# Patient Record
Sex: FEMALE | Race: WHITE | NOT HISPANIC OR LATINO | Employment: OTHER | URBAN - METROPOLITAN AREA
[De-identification: names, ages, dates, MRNs, and addresses within clinical notes are randomized per-mention and may not be internally consistent; named-entity substitution may affect disease eponyms.]

---

## 2019-07-08 ENCOUNTER — TELEPHONE (OUTPATIENT)
Dept: INFECTIOUS DISEASES | Facility: CLINIC | Age: 69
End: 2019-07-08

## 2019-07-08 NOTE — TELEPHONE ENCOUNTER
Received a call from pt for treatment for latent tb  Expressed we would need a test showing that she has latent tb and a referral  Pt would also need a chest xray  She will request these things as she sees her pcp this week  I have provided her with our fax number to provide us with this info

## 2019-08-01 ENCOUNTER — OFFICE VISIT (OUTPATIENT)
Dept: INFECTIOUS DISEASES | Facility: CLINIC | Age: 69
End: 2019-08-01
Payer: MEDICARE

## 2019-08-01 VITALS
HEART RATE: 73 BPM | SYSTOLIC BLOOD PRESSURE: 118 MMHG | RESPIRATION RATE: 16 BRPM | TEMPERATURE: 98.4 F | WEIGHT: 132.8 LBS | BODY MASS INDEX: 23.53 KG/M2 | DIASTOLIC BLOOD PRESSURE: 66 MMHG | HEIGHT: 63 IN

## 2019-08-01 DIAGNOSIS — R76.11 POSITIVE TB TEST: ICD-10-CM

## 2019-08-01 DIAGNOSIS — Z22.7 TB LUNG, LATENT: ICD-10-CM

## 2019-08-01 DIAGNOSIS — L40.9 PSORIASIS: ICD-10-CM

## 2019-08-01 DIAGNOSIS — Z22.7 TB LUNG, LATENT: Primary | ICD-10-CM

## 2019-08-01 PROCEDURE — 99204 OFFICE O/P NEW MOD 45 MIN: CPT | Performed by: INTERNAL MEDICINE

## 2019-08-01 RX ORDER — PYRIDOXINE HCL (VITAMIN B6) 50 MG
50 TABLET ORAL DAILY
Qty: 30 TABLET | Refills: 8 | Status: SHIPPED | OUTPATIENT
Start: 2019-08-01 | End: 2019-08-01 | Stop reason: SDUPTHER

## 2019-08-01 RX ORDER — FLUOCINOLONE ACETONIDE 0.11 MG/ML
OIL TOPICAL
COMMUNITY
Start: 2011-10-17

## 2019-08-01 RX ORDER — ISONIAZID 300 MG/1
300 TABLET ORAL DAILY
Qty: 30 TABLET | Refills: 8 | Status: SHIPPED | OUTPATIENT
Start: 2019-08-01 | End: 2019-08-01 | Stop reason: SDUPTHER

## 2019-08-01 RX ORDER — PYRIDOXINE HCL (VITAMIN B6) 50 MG
50 TABLET ORAL DAILY
Qty: 30 TABLET | Refills: 8 | Status: SHIPPED | OUTPATIENT
Start: 2019-08-01

## 2019-08-01 RX ORDER — AMLODIPINE BESYLATE 5 MG/1
5 TABLET ORAL 2 TIMES DAILY
COMMUNITY
Start: 2019-07-15

## 2019-08-01 RX ORDER — ROSUVASTATIN CALCIUM 5 MG/1
5 TABLET, COATED ORAL DAILY
COMMUNITY
Start: 2019-07-15

## 2019-08-01 RX ORDER — CALCIPOTRIENE 50 UG/G
CREAM TOPICAL
COMMUNITY

## 2019-08-01 RX ORDER — DIPHENHYDRAMINE HCL 25 MG
25 CAPSULE ORAL EVERY 12 HOURS PRN
COMMUNITY

## 2019-08-01 RX ORDER — LOSARTAN POTASSIUM 50 MG/1
50 TABLET ORAL DAILY
COMMUNITY
Start: 2019-07-15

## 2019-08-01 RX ORDER — ISONIAZID 300 MG/1
300 TABLET ORAL DAILY
Qty: 90 TABLET | Refills: 2 | Status: SHIPPED | OUTPATIENT
Start: 2019-08-01 | End: 2020-07-02 | Stop reason: ALTCHOICE

## 2019-08-01 NOTE — PATIENT INSTRUCTIONS
Take isoniazid (antibiotic) and pyridoxine/B6 (vitamin) for 9 months  Get blood work in 1 month prior to visit with us  Report to us immediately any new abdominal pain, nausea, vomiting, yellow discoloration of your skin, or tingling/pain/numbness of your hands or feet

## 2019-08-01 NOTE — PROGRESS NOTES
Outpatient Consultation - Infectious Disease   Jb Luque 76 y o  female MRN: [id number]      IMPRESSION & RECOMMENDATIONS:   Impression/Recommendations:  1  Latent TB  Serial positive PPD in setting of known TB exposure as a child  No signs or symptoms of active TB  Chest x-ray negative  High risk for reactivation in setting of possible biological therapy for # 2  At this time low risk for hepatotoxicity given normal CMP, no high-risk medications  Does drink daily alcohol  Rec:   · Start INH/pyridoxine with plan to continue for 9 months total  · Check CMP in 1 month at patient request   If normal, likely does not need further lab monitoring  · RTO in one month  If tolerating meds can f/u Q3 months  · Advised cessation of daily alcohol use while on INH  · Advised monitoring for any new abdominal pain, nausea, vomiting, yellow discoloration of skin, or tingling/pain/numbness of hands or feet while on INH  2   Psoriatic arthritis  Currently on apremilast/Otezla which is becoming preservative Piedad expensive  May need to pursue biologics  Rec:   · Outpatient Rheumatology follow-up ongoing    The above plan discussed in detail with the patient    Antibiotics:  None    Thank you for this consultation  HISTORY OF PRESENT ILLNESS:  Reason for Consult:  Positive PPD    HPI: Jb Luque is a 76 y o  female with history of psoriatic arthritis  This was originally treated with topical steroids per Dermatology  She was recently started on apremilast/Otezla by her rheumatologist   She has been getting samples but this is becoming prohibitive Piedad expensive so she may need to pursue biologic in the future  The patient has a history of her father having tuberculosis when she was 15years old  Her mother worked at that time and 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    She did have a PPD that was positive at that time but there is no medications available so she was seat followed serially with chest x-rays  She subsequently did well  As an adult she was found to have a positive PPD again but at that time it was felt potential toxicities of the medications outweighed any benefit and she was not treated  Given that she is going to pursue potential biologic therapy we are asked to comment on further evaluation and management  REVIEW OF SYSTEMS:  She denies any fevers, chills, sweats, cough, weight loss  A complete system-based review of systems is otherwise negative  PAST MEDICAL HISTORY:  Past Medical History:   Diagnosis Date    Hyperlipidemia     Hypertension     Psoriasis      Past Surgical History:   Procedure Laterality Date    APPENDECTOMY         FAMILY HISTORY:  Non-contributory    SOCIAL HISTORY:  Social History     Substance and Sexual Activity   Alcohol Use Not on file     Social History     Substance and Sexual Activity   Drug Use Not on file     Social History     Tobacco Use   Smoking Status Former Smoker   Smokeless Tobacco Never Used   Tobacco Comment    Quit 2010       ALLERGIES:  No Known Allergies    MEDICATIONS:  All current active medications have been reviewed      PHYSICAL EXAM:  Vitals:  /66   Pulse 73   Temp 98 4 °F (36 9 °C)   Resp 16   Ht 5' 3" (1 6 m)   Wt 60 2 kg (132 lb 12 8 oz)   BMI 23 52 kg/m²      Physical Exam:  General:  Well-nourished, well-developed, in no acute distress  Eyes:  Conjunctive clear with no hemorrhages or effusions  Oropharynx:  No ulcers, no lesions  Neck:  Supple, no lymphadenopathy  Lungs:  Clear to auscultation bilaterally, no accessory muscle use  Cardiac:  Regular rate and rhythm, no murmurs  Abdomen:  Soft, non-tender, non-distended  Extremities:  No peripheral cyanosis, clubbing, or edema  Skin:  No rashes, no ulcers, scars from psoriatic plaques over elbows  Neurological:  Moves all four extremities spontaneously, sensation grossly intact    LABS, IMAGING, & OTHER STUDIES:  Lab Results:  I have personally reviewed pertinent labs  CBC, CMP from 7/2/19 normal    Imaging Studies:   I have personally reviewed pertinent imaging study reports and images in PACS  CXR 7/16 from [facility] no pneumonia    EKG, Pathology, and Other Studies:   I have personally reviewed pertinent reports

## 2019-09-03 ENCOUNTER — APPOINTMENT (OUTPATIENT)
Dept: LAB | Facility: CLINIC | Age: 69
End: 2019-09-03
Payer: MEDICARE

## 2019-09-03 DIAGNOSIS — Z22.7 TB LUNG, LATENT: ICD-10-CM

## 2019-09-03 LAB
ALBUMIN SERPL BCP-MCNC: 3.9 G/DL (ref 3.5–5)
ALP SERPL-CCNC: 44 U/L (ref 46–116)
ALT SERPL W P-5'-P-CCNC: 38 U/L (ref 12–78)
ANION GAP SERPL CALCULATED.3IONS-SCNC: 7 MMOL/L (ref 4–13)
AST SERPL W P-5'-P-CCNC: 27 U/L (ref 5–45)
BILIRUB SERPL-MCNC: 0.4 MG/DL (ref 0.2–1)
BUN SERPL-MCNC: 13 MG/DL (ref 5–25)
CALCIUM SERPL-MCNC: 9 MG/DL (ref 8.3–10.1)
CHLORIDE SERPL-SCNC: 106 MMOL/L (ref 100–108)
CO2 SERPL-SCNC: 28 MMOL/L (ref 21–32)
CREAT SERPL-MCNC: 1.03 MG/DL (ref 0.6–1.3)
GFR SERPL CREATININE-BSD FRML MDRD: 56 ML/MIN/1.73SQ M
GLUCOSE P FAST SERPL-MCNC: 92 MG/DL (ref 65–99)
POTASSIUM SERPL-SCNC: 4 MMOL/L (ref 3.5–5.3)
PROT SERPL-MCNC: 7 G/DL (ref 6.4–8.2)
SODIUM SERPL-SCNC: 141 MMOL/L (ref 136–145)

## 2019-09-03 PROCEDURE — 36415 COLL VENOUS BLD VENIPUNCTURE: CPT

## 2019-09-03 PROCEDURE — 80053 COMPREHEN METABOLIC PANEL: CPT

## 2019-09-10 ENCOUNTER — OFFICE VISIT (OUTPATIENT)
Dept: INFECTIOUS DISEASES | Facility: CLINIC | Age: 69
End: 2019-09-10
Payer: MEDICARE

## 2019-09-10 VITALS
BODY MASS INDEX: 23.04 KG/M2 | SYSTOLIC BLOOD PRESSURE: 118 MMHG | TEMPERATURE: 98.5 F | RESPIRATION RATE: 16 BRPM | HEART RATE: 68 BPM | HEIGHT: 63 IN | WEIGHT: 130 LBS | DIASTOLIC BLOOD PRESSURE: 64 MMHG

## 2019-09-10 DIAGNOSIS — L40.50 PSORIATIC ARTHRITIS (HCC): ICD-10-CM

## 2019-09-10 DIAGNOSIS — Z22.7 TB LUNG, LATENT: Primary | ICD-10-CM

## 2019-09-10 DIAGNOSIS — K04.7 DENTAL ABSCESS: ICD-10-CM

## 2019-09-10 PROCEDURE — 99214 OFFICE O/P EST MOD 30 MIN: CPT | Performed by: INTERNAL MEDICINE

## 2019-09-10 RX ORDER — AMOXICILLIN 500 MG/1
500 TABLET, FILM COATED ORAL EVERY 8 HOURS
COMMUNITY
Start: 2019-09-05 | End: 2019-11-12 | Stop reason: ALTCHOICE

## 2019-09-10 RX ORDER — CHLORHEXIDINE GLUCONATE 0.12 MG/ML
RINSE ORAL
COMMUNITY
Start: 2019-09-05

## 2019-09-10 NOTE — PROGRESS NOTES
Progress Note - Infectious Disease   Eli Ward 76 y o  female MRN: [id number]  Unit/Bed#:  Encounter: [id number]      Impression/Plan:  1  Latent TB  Serial positive PPD in setting of known TB exposure as a child  No signs or symptoms of active TB  Chest x-ray negative  High risk for reactivation in setting of possible biological therapy for # 2  The patient is tolerating the isoniazid and vitamin B6 well and the liver function tests remain normal   She is not drinking any alcohol  Rec:       ? Continue INH/pyridoxine with plan to continue for 9 months total  ? RTO in one month  ? LFTs monthly as per the patient's request  ? Advised ongoing alcohol avoidance while on INH  ? Advised monitoring for any new abdominal pain, nausea, vomiting, yellow discoloration of skin, or tingling/pain/numbness of hands or feet while on INH      2   Psoriatic arthritis  Currently on apremilast/Otezla which is becoming preservative Piedad expensive  May need to pursue biologics  Rec:       ? Outpatient Rheumatology follow-up ongoing    3  Dental abscess-currently on amoxicillin and symptomatic we improving  Continue amoxicillin and follow-up with dentist and periodontist        Antibiotics:  Isoniazid/vitamin B6 since 08/01/19     Subjective:  Patient here for follow-up for latent tuberculosis infection  She has now completed more than a month of isoniazid and vitamin B6  Patient has no fever, chills, sweats; no nausea, vomiting, diarrhea; no cough, shortness of breath  Patient did developed but dental abscess recently and has been on oral amoxicillin with improvement of her left-sided facial/dental pain  She remains very nervous about developing liver test abnormalities    ROS: A complete 12 point ROS is negative other than that noted in the HPI    Followup portions patient history reviewed and updated as:   Allergies, current medications, past medical history, past social history, past surgical history, and the problem list    Objective:  Vitals:  Vitals:    09/10/19 0750   BP: 118/64   Pulse: 68   Resp: 16   Temp: 98 5 °F (36 9 °C)   Weight: 59 kg (130 lb)   Height: 5' 3" (1 6 m)       Physical Exam:   General Appearance:  Alert, interactive, appearing well, nontoxic, no acute distress  Throat: Oropharynx moist without lesions  No facial swelling or redness   Lungs:   Clear to auscultation bilaterally; no audible wheezes, rhonchi or rales; respirations unlabored   Heart:  RRR; no murmur, rub or gallop   Abdomen:   Soft, non-tender, non-distended, positive bowel sounds  Extremities: No clubbing, cyanosis or edema   Skin: No new rashes or lesions  No new draining wounds         Labs, Imaging, & Other studies:   All pertinent labs and imaging studies were personally reviewed    Lab Results   Component Value Date    K 4 0 09/03/2019     09/03/2019    CO2 28 09/03/2019    BUN 13 09/03/2019    CREATININE 1 03 09/03/2019    GLUF 92 09/03/2019    CALCIUM 9 0 09/03/2019    AST 27 09/03/2019    ALT 38 09/03/2019    ALKPHOS 44 (L) 09/03/2019    EGFR 56 09/03/2019

## 2019-09-12 ENCOUNTER — TELEPHONE (OUTPATIENT)
Dept: INFECTIOUS DISEASES | Facility: CLINIC | Age: 69
End: 2019-09-12

## 2019-09-12 NOTE — TELEPHONE ENCOUNTER
Pt called to inform that her insurance will not cover monthly Lfts labs  Will ask Dr Unruly De La Cruz if its ok to discontinue orders

## 2019-10-08 ENCOUNTER — OFFICE VISIT (OUTPATIENT)
Dept: INFECTIOUS DISEASES | Facility: CLINIC | Age: 69
End: 2019-10-08
Payer: MEDICARE

## 2019-10-08 VITALS
SYSTOLIC BLOOD PRESSURE: 120 MMHG | TEMPERATURE: 97 F | BODY MASS INDEX: 22.96 KG/M2 | WEIGHT: 129.6 LBS | HEIGHT: 63 IN | RESPIRATION RATE: 15 BRPM | DIASTOLIC BLOOD PRESSURE: 70 MMHG | HEART RATE: 68 BPM

## 2019-10-08 DIAGNOSIS — Z22.7 TB LUNG, LATENT: Primary | ICD-10-CM

## 2019-10-08 DIAGNOSIS — L40.50 PSORIATIC ARTHRITIS (HCC): ICD-10-CM

## 2019-10-08 PROCEDURE — 99213 OFFICE O/P EST LOW 20 MIN: CPT | Performed by: PHYSICIAN ASSISTANT

## 2019-10-08 NOTE — PROGRESS NOTES
Assessment/Plan:    TB lung, latent  Serial positive PPD in setting of known TB exposure as a child   No signs or symptoms of active TB   Chest x-ray negative   High risk for reactivation in setting of possible biological therapy for # 2  The patient is tolerating the isoniazid and vitamin B6 well and the liver function tests remain normal   She is not drinking any alcohol  Rec:       ? Continue INH/pyridoxine with plan to continue for 9 months total  ? RTO in one month  ? LFTs every 3 months (patient no longer wishes to have monthly LFTs )  ? Advised ongoing alcohol avoidance while on INH  ? Advised monitoring for any new abdominal pain, nausea, vomiting, yellow discoloration of skin, or tingling/pain/numbness of hands or feet while on INH  Psoriatic arthritis ([address] )  Currently on apremilast/Otezla   May need to pursue biologics  Rec:       ? Outpatient Rheumatology follow-up ongoing       Diagnoses and all orders for this visit:    TB lung, latent  -     Comprehensive metabolic panel; Future    Psoriatic arthritis (HCC)          Subjective:      Patient ID: Stevie Elkins is a 76 y o  female  HPI  75 y/o female presents for one month office f/u today regarding latent Tb  Patient was initially seen at our office the beginning of August and started on treatment  This is now her a 2nd follow-up since starting INH/B6  Patient is tolerating the medications without difficulty  She denies any fevers chills diarrhea rash  She additionally denies any abdominal pain or numbness tingling in the hands and feet  She states she has been abstaining from alcohol  The following portions of the patient's history were reviewed and updated as appropriate: allergies, current medications, past family history, past medical history, past social history, past surgical history and problem list     Review of Systems   Constitutional: Negative for chills and fever  HENT: Negative for mouth sores      Respiratory: Negative for cough and shortness of breath  Cardiovascular: Negative for chest pain, palpitations and leg swelling  Gastrointestinal: Negative for abdominal pain, diarrhea, nausea and vomiting  Genitourinary: Negative for difficulty urinating, dysuria and frequency  Musculoskeletal: Negative for arthralgias, back pain, joint swelling and myalgias  Skin: Negative for rash and wound  Neurological: Negative for headaches  Psychiatric/Behavioral: Negative for behavioral problems and confusion  Objective:      /70   Pulse 68   Temp (!) 97 °F (36 1 °C)   Resp 15   Ht 5' 3" (1 6 m)   Wt 58 8 kg (129 lb 9 6 oz)   BMI 22 96 kg/m²          Physical Exam   Constitutional: She is oriented to person, place, and time  She appears well-developed and well-nourished  No distress  HENT:   Head: Normocephalic and atraumatic  Right Ear: External ear normal    Left Ear: External ear normal    Nose: Nose normal    Mouth/Throat: Oropharynx is clear and moist  No oropharyngeal exudate  Eyes: Conjunctivae are normal  Right eye exhibits no discharge  Left eye exhibits no discharge  No scleral icterus  Neck: Normal range of motion  Neck supple  Cardiovascular: Normal rate, regular rhythm and normal heart sounds  Pulmonary/Chest: Effort normal and breath sounds normal  No stridor  No respiratory distress  She has no wheezes  She has no rales  She exhibits no tenderness  Abdominal: Soft  Bowel sounds are normal  She exhibits no distension  There is no tenderness  Musculoskeletal: Normal range of motion  She exhibits no edema  Neurological: She is alert and oriented to person, place, and time  Skin: Skin is warm and dry  No rash noted  She is not diaphoretic  No erythema  Psychiatric: She has a normal mood and affect  Her behavior is normal    Nursing note and vitals reviewed        Labs:  09/03/2019  AST 27  ALT 38  Alk-phos 44

## 2019-10-08 NOTE — PATIENT INSTRUCTIONS
Continue INH and vitamin B6 as ordered  Continue to avoid alcohol as discussed  Please obtain blood work the beginning of December 2019  Return to office in 1 month for follow-up  Please call in the interim with any questions or concerns

## 2019-10-08 NOTE — ASSESSMENT & PLAN NOTE
Currently on apremilast/Otezla   May need to pursue biologics    Rec:       ? Outpatient Rheumatology follow-up ongoing

## 2019-10-08 NOTE — ASSESSMENT & PLAN NOTE
Serial positive PPD in setting of known TB exposure as a child   No signs or symptoms of active TB   Chest x-ray negative   High risk for reactivation in setting of possible biological therapy for # 2  The patient is tolerating the isoniazid and vitamin B6 well and the liver function tests remain normal   She is not drinking any alcohol  Rec:       ? Continue INH/pyridoxine with plan to continue for 9 months total  ? RTO in one month  ? LFTs every 3 months (patient no longer wishes to have monthly LFTs )  ? Advised ongoing alcohol avoidance while on INH  ? Advised monitoring for any new abdominal pain, nausea, vomiting, yellow discoloration of skin, or tingling/pain/numbness of hands or feet while on INH

## 2019-11-05 ENCOUNTER — TELEPHONE (OUTPATIENT)
Dept: INFECTIOUS DISEASES | Facility: CLINIC | Age: 69
End: 2019-11-05

## 2019-11-05 NOTE — TELEPHONE ENCOUNTER
Contacted pt to remind her to have LFT's drawn  They are supposed to be q3 months and she hasn't yet had any drawn  Left a message with this reminder and for pt to call back to inform when she would be able to have them drawn

## 2019-11-07 RX ORDER — FLUOCINOLONE ACETONIDE 0.11 MG/ML
OIL TOPICAL
COMMUNITY
Start: 2019-10-09

## 2019-11-12 ENCOUNTER — OFFICE VISIT (OUTPATIENT)
Dept: INFECTIOUS DISEASES | Facility: CLINIC | Age: 69
End: 2019-11-12
Payer: MEDICARE

## 2019-11-12 VITALS
WEIGHT: 125 LBS | HEIGHT: 63 IN | SYSTOLIC BLOOD PRESSURE: 112 MMHG | TEMPERATURE: 98.2 F | DIASTOLIC BLOOD PRESSURE: 58 MMHG | HEART RATE: 66 BPM | BODY MASS INDEX: 22.15 KG/M2

## 2019-11-12 DIAGNOSIS — Z22.7 TB LUNG, LATENT: Primary | ICD-10-CM

## 2019-11-12 DIAGNOSIS — L40.50 PSORIATIC ARTHRITIS (HCC): ICD-10-CM

## 2019-11-12 PROCEDURE — 99213 OFFICE O/P EST LOW 20 MIN: CPT | Performed by: PHYSICIAN ASSISTANT

## 2019-11-12 NOTE — PROGRESS NOTES
Assessment/Plan:    TB lung, latent  Serial positive PPD in setting of known TB exposure as a child   No signs or symptoms of active TB   Chest x-ray negative   High risk for reactivation in setting of possible biological therapy for # 2  The patient is tolerating the isoniazid and vitamin B6 well  Rec:       ? Continue INH/pyridoxine with plan to continue for 9 months total (through April 2020)  ? RTO in one month  ? LFTs every 3 months (patient no longer wishes to have monthly LFTs )  ? Advised ongoing alcohol avoidance while on INH  ? Advised monitoring for any new abdominal pain, nausea, vomiting, yellow discoloration of skin, or tingling/pain/numbness of hands or feet while on INH        Psoriatic arthritis ([address] )  Currently on apremilast/Otezla   May need to pursue biologics  Patient's next dermatology follow-up is in January 2020  Diagnoses and all orders for this visit:    TB lung, latent    Psoriatic arthritis ([address] )    Other orders  -     Fluocinolone Acetonide Scalp 0 01 % OIL          Subjective:      Patient ID: Gerardo Mcdowell is a 71 y o  female  HPI  42-year-old female presents for her one-month office follow-up today regarding latent TB  Patient was initially seen in our office at the beginning of August at which time she was started on treatment  Patient states she continues to tolerate the medications without difficulty  She has not started any new medications since her last visit  She denies any fevers chills rash diarrhea  She denies any abdominal pain, nausea, vomiting  She denies any numbness or tingling in the hands feet  The following portions of the patient's history were reviewed and updated as appropriate: allergies, current medications, past family history, past medical history, past social history, past surgical history and problem list     Review of Systems   Constitutional: Negative for chills and fever  HENT: Negative for mouth sores      Respiratory: Negative for cough and shortness of breath  Cardiovascular: Negative for chest pain, palpitations and leg swelling  Gastrointestinal: Negative for abdominal pain, diarrhea, nausea and vomiting  Genitourinary: Negative for difficulty urinating, dysuria and frequency  Musculoskeletal: Negative for arthralgias, back pain, joint swelling and myalgias  Skin: Negative for rash and wound  Neurological: Negative for headaches  Psychiatric/Behavioral: Negative for behavioral problems and confusion  Objective:      /58   Pulse 66   Temp 98 2 °F (36 8 °C)   Ht 5' 3" (1 6 m)   Wt 56 7 kg (125 lb)   BMI 22 14 kg/m²          Physical Exam   Constitutional: She is oriented to person, place, and time  She appears well-developed and well-nourished  No distress  HENT:   Head: Normocephalic and atraumatic  Right Ear: External ear normal    Left Ear: External ear normal    Nose: Nose normal    Mouth/Throat: Oropharynx is clear and moist  No oropharyngeal exudate  Eyes: Conjunctivae are normal  Right eye exhibits no discharge  Left eye exhibits no discharge  No scleral icterus  Neck: Normal range of motion  Neck supple  Cardiovascular: Normal rate, regular rhythm and normal heart sounds  Pulmonary/Chest: Effort normal and breath sounds normal  No stridor  No respiratory distress  She has no wheezes  She has no rales  She exhibits no tenderness  Abdominal: Soft  Bowel sounds are normal  She exhibits no distension  There is no tenderness  Musculoskeletal: Normal range of motion  She exhibits no edema  Neurological: She is alert and oriented to person, place, and time  Skin: Skin is warm and dry  No rash noted  She is not diaphoretic  Psychiatric: She has a normal mood and affect  Her behavior is normal    Nursing note and vitals reviewed        Labs:  None

## 2019-11-12 NOTE — ASSESSMENT & PLAN NOTE
Serial positive PPD in setting of known TB exposure as a child   No signs or symptoms of active TB   Chest x-ray negative   High risk for reactivation in setting of possible biological therapy for # 2  The patient is tolerating the isoniazid and vitamin B6 well  Rec:       ? Continue INH/pyridoxine with plan to continue for 9 months total (through April 2020)  ? RTO in one month  ? LFTs every 3 months (patient no longer wishes to have monthly LFTs )  ? Advised ongoing alcohol avoidance while on INH  ? Advised monitoring for any new abdominal pain, nausea, vomiting, yellow discoloration of skin, or tingling/pain/numbness of hands or feet while on INH

## 2019-11-12 NOTE — ASSESSMENT & PLAN NOTE
Currently on apremilast/Otezla   May need to pursue biologics  Patient's next dermatology follow-up is in January 2020

## 2019-11-12 NOTE — PATIENT INSTRUCTIONS
Continue INH/pyridoxine as ordered  LFTs in one month  Office follow up in one month  Please call with any questions or concerns

## 2019-11-29 ENCOUNTER — TRANSCRIBE ORDERS (OUTPATIENT)
Dept: LAB | Facility: CLINIC | Age: 69
End: 2019-11-29

## 2019-11-29 ENCOUNTER — APPOINTMENT (OUTPATIENT)
Dept: LAB | Facility: CLINIC | Age: 69
End: 2019-11-29
Payer: MEDICARE

## 2019-11-29 DIAGNOSIS — Z22.7 TB LUNG, LATENT: ICD-10-CM

## 2019-11-29 LAB
ALBUMIN SERPL BCP-MCNC: 3.8 G/DL (ref 3.5–5)
ALP SERPL-CCNC: 61 U/L (ref 46–116)
ALT SERPL W P-5'-P-CCNC: 162 U/L (ref 12–78)
ANION GAP SERPL CALCULATED.3IONS-SCNC: 7 MMOL/L (ref 4–13)
AST SERPL W P-5'-P-CCNC: 136 U/L (ref 5–45)
BILIRUB SERPL-MCNC: 0.44 MG/DL (ref 0.2–1)
BUN SERPL-MCNC: 20 MG/DL (ref 5–25)
CALCIUM SERPL-MCNC: 9.5 MG/DL (ref 8.3–10.1)
CHLORIDE SERPL-SCNC: 108 MMOL/L (ref 100–108)
CO2 SERPL-SCNC: 29 MMOL/L (ref 21–32)
CREAT SERPL-MCNC: 1.11 MG/DL (ref 0.6–1.3)
GFR SERPL CREATININE-BSD FRML MDRD: 51 ML/MIN/1.73SQ M
GLUCOSE P FAST SERPL-MCNC: 86 MG/DL (ref 65–99)
POTASSIUM SERPL-SCNC: 4.1 MMOL/L (ref 3.5–5.3)
PROT SERPL-MCNC: 6.7 G/DL (ref 6.4–8.2)
SODIUM SERPL-SCNC: 144 MMOL/L (ref 136–145)

## 2019-11-29 PROCEDURE — 80053 COMPREHEN METABOLIC PANEL: CPT

## 2019-11-29 PROCEDURE — 36415 COLL VENOUS BLD VENIPUNCTURE: CPT

## 2019-12-04 ENCOUNTER — TELEPHONE (OUTPATIENT)
Dept: INFECTIOUS DISEASES | Facility: CLINIC | Age: 69
End: 2019-12-04

## 2019-12-04 DIAGNOSIS — R74.8 ELEVATED LIVER ENZYMES: ICD-10-CM

## 2019-12-04 DIAGNOSIS — Z22.7 TB LUNG, LATENT: Primary | ICD-10-CM

## 2019-12-04 NOTE — TELEPHONE ENCOUNTER
Left VM for pt to call back  If patient does not call back today, I will call her again before the end of the day  Symptoms would be N/V, abd pain, jaundice  ----- Message from Manasa Marshall PA-C sent at 12/4/2019 10:39 AM EST -----  Lubna Sams,   Can you please call this patient and see if she is symptomatic with the acute rise in her LFTs? If she has no symptoms have her get a repeat set of LFTs next week before her appt on 12/11  If she is having symptoms then she should hold her INH

## 2019-12-04 NOTE — TELEPHONE ENCOUNTER
Notified patient of instructions below  Order for LFTs for next Monday placed in [place] and pt will have them drawn      ----- Message from Jenelle Nelson MD sent at 12/4/2019  3:16 PM EST -----  Regarding: RE: Elevated LFTs  Recommend continuing meds, recheck labs as outlined by Kelly Sutherland  ----- Message -----  From: Sidney Lindquist RN  Sent: 12/4/2019   2:48 PM EST  To: Jenelle Nelson MD  Subject: Elevated LFTs                                    I spoke to patient  She denies N/V and jaundice  She does c/o R middle back pain that is new  What should we advise?       ----- Message -----  From: Thor Ochoa PA-C  Sent: 12/4/2019  10:39 AM EST  To: Jenelle Nelson MD, NOEL Mcpherson,   Can you please call this patient and see if she is symptomatic with the acute rise in her LFTs? If she has no symptoms have her get a repeat set of LFTs next week before her appt on 12/11  If she is having symptoms then she should hold her INH

## 2019-12-07 ENCOUNTER — APPOINTMENT (OUTPATIENT)
Dept: LAB | Facility: CLINIC | Age: 69
End: 2019-12-07
Payer: MEDICARE

## 2019-12-07 DIAGNOSIS — R74.8 ELEVATED LIVER ENZYMES: ICD-10-CM

## 2019-12-07 DIAGNOSIS — Z22.7 TB LUNG, LATENT: ICD-10-CM

## 2019-12-07 LAB
ALBUMIN SERPL BCP-MCNC: 3.9 G/DL (ref 3.5–5)
ALP SERPL-CCNC: 54 U/L (ref 46–116)
ALT SERPL W P-5'-P-CCNC: 151 U/L (ref 12–78)
AST SERPL W P-5'-P-CCNC: 84 U/L (ref 5–45)
BILIRUB DIRECT SERPL-MCNC: 0.14 MG/DL (ref 0–0.2)
BILIRUB SERPL-MCNC: 0.39 MG/DL (ref 0.2–1)
PROT SERPL-MCNC: 7 G/DL (ref 6.4–8.2)

## 2019-12-07 PROCEDURE — 80076 HEPATIC FUNCTION PANEL: CPT

## 2019-12-07 PROCEDURE — 36415 COLL VENOUS BLD VENIPUNCTURE: CPT

## 2019-12-11 ENCOUNTER — OFFICE VISIT (OUTPATIENT)
Dept: INFECTIOUS DISEASES | Facility: CLINIC | Age: 69
End: 2019-12-11
Payer: MEDICARE

## 2019-12-11 VITALS
RESPIRATION RATE: 17 BRPM | HEIGHT: 63 IN | DIASTOLIC BLOOD PRESSURE: 65 MMHG | TEMPERATURE: 97.1 F | WEIGHT: 123 LBS | SYSTOLIC BLOOD PRESSURE: 100 MMHG | BODY MASS INDEX: 21.79 KG/M2 | HEART RATE: 61 BPM

## 2019-12-11 DIAGNOSIS — R42 DIZZINESS: Primary | ICD-10-CM

## 2019-12-11 DIAGNOSIS — R53.83 OTHER FATIGUE: ICD-10-CM

## 2019-12-11 DIAGNOSIS — R74.8 ELEVATED LIVER ENZYMES: ICD-10-CM

## 2019-12-11 DIAGNOSIS — R63.4 WEIGHT LOSS: ICD-10-CM

## 2019-12-11 DIAGNOSIS — L40.50 PSORIATIC ARTHRITIS (HCC): ICD-10-CM

## 2019-12-11 DIAGNOSIS — Z22.7 TB LUNG, LATENT: ICD-10-CM

## 2019-12-11 PROCEDURE — 99214 OFFICE O/P EST MOD 30 MIN: CPT | Performed by: INTERNAL MEDICINE

## 2019-12-11 NOTE — LETTER
December 11, 2019     Nilda Shrestha MD  [address]    Patient: Crystal Lopez   YOB: 1950   Date of Visit: 12/11/2019       Dear Dr Brenda Corona:    Thank you for referring Crystal Lopez to me for evaluation  Below are my notes for this consultation  If you have questions, please do not hesitate to call me  I look forward to following your patient along with you  Sincerely,        Abdullahi Russ MD        CC: No Recipients  Abdullahi Russ MD  12/11/2019 11:38 AM  Sign at close encounter  Progress Note - Infectious Disease   Crystal Lopez 71 y o  female MRN: [id number]  Unit/Bed#:  Encounter: [id number]      Impression/Plan:  1  Latent tuberculosis  Patient has a known exposure to tuberculosis as a child with positive PPD in the past   No acute signs of active TB at this time and prior chest x-ray was negative  She has eventually plan for biologic therapy and so recommended for latent TB therapy  Low suspicion for side effects to medication  Prior elevation to LFTs noted which is improving  Continue INH/B6 with plan through April 2020  Will obtain repeat LFTs in 2 weeks/prior to visit  Advised against alcohol use  Continue to monitor for abdominal symptoms and skin discoloration along with tingling/pain/numbness in the hands and feet while on INH  Additional care as below  Patient is to follow-up in the office in 3 weeks    2  Psoriatic arthritis  Patient with underlying psoriatic arthritis and possibility of starting biologic therapy  Follow-up with dermatology as planned in January  3  Dizziness, fatigue, weight loss  Patient notes new symptoms as of Saturday  Suspect weight loss may be due to starting INH/B6 in addition to her other medications  Given blood pressure today and symptoms with standing suspect patient is developing orthostatics hypotension from her blood pressure medications  Neuro exam otherwise unremarkable  Patient has already stop amlodipine  Recommend continuing to hold amlodipine  Continue losartan for now  Recommended patient check blood pressures every morning, with episodes of dizziness and fatigue and to document these values  Recommended that she reach out to her primary care doctor in the next few days with these numbers for further titration of her blood pressure medications  Continue treatment of latent TB otherwise as above    Above plan discussed in detail with the patient  Will forward office visit to primary care provider    RTO in 3-4 weeks to follow up on symptoms  Antibiotics:  INH/B6--planned through April 2020    Subjective: This patient is a 69-year-old female who presents to our office for follow-up regarding latent tuberculosis therapy  Patient was last seen in the office on 11/21  She is plan to continue therapy through April 2020  She reports compliance with her medication with INH and B6  She denies having any fevers, chills, nausea, vomiting, diarrhea, shortness of breath or chest pain  The patient unfortunately has noted weight loss about 8 pounds since August   She is not actively trying to lose any weight  Since last Saturday she has noted dizziness particularly when she goes from a seated to a standing position and at night when she gets up to go to the bathroom  She also reports ongoing fatigue since Saturday  She denies having any upper respiratory symptoms  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  She denies any numbness or tingling in her feet or hands  She also reports some mild pain in her midback and subjective weakness in her ankles  She denies starting any new medications or over-the-counter supplements and majority of her medications that she is on now have been present for at least a year    She has started taking her blood pressure at home and has found that during these episodes of feeling fatigued and dizzy that her blood pressures are low  Here in the office today blood pressure is 100/65  The patient is hopeful to continue treatment for latent tuberculosis as there is plans for biologic therapy in the future  ROS: A complete 12 point ROS is negative other than that noted in the HPI    Followup portions patient history reviewed and updated as: Allergies, current medications, past medical history, past social history, past surgical history, and the problem list    Objective:  Vitals:  Vitals:    12/11/19 0851   BP: 100/65   Pulse: 61   Resp: 17   Temp: (!) 97 1 °F (36 2 °C)   Weight: 55 8 kg (123 lb)   Height: 5' 3" (1 6 m)       Physical Exam:   General Appearance:  Alert, interactive, appearing well, nontoxic, no acute distress  Patient provides an adequate history  Throat: Oropharynx moist without lesions  Lungs:   Clear to auscultation bilaterally; no audible wheezes, rhonchi or rales; respirations unlabored on room air   Heart:  RRR; no murmur, rub or gallop   Abdomen:   Soft, non-tender, non-distended, positive bowel sounds  Extremities: No clubbing, cyanosis or edema   Neuro: Cranial nerves are intact, I am unable to be create her dizziness in a seated position with moving the head  Motor strength is intact in all of her extremities  Patient's dizziness returns when she goes into a standing position from seeded  Negative Romberg sign  Her gait is otherwise unremarkable  Grossly intact sensation in her fingers  Skin: No new rashes or lesions  No new draining wounds         Labs, Imaging, & Other studies:   All pertinent labs and imaging studies were personally reviewed    Lab Results   Component Value Date    K 4 1 11/29/2019     11/29/2019    CO2 29 11/29/2019    BUN 20 11/29/2019    CREATININE 1 11 11/29/2019    GLUF 86 11/29/2019    CALCIUM 9 5 11/29/2019    AST 84 (H) 12/07/2019     (H) 12/07/2019    ALKPHOS 54 12/07/2019    EGFR 51 11/29/2019     No results found for: WBC, HGB, HCT, MCV, PLTNo results found for: Ciara Laulim

## 2019-12-11 NOTE — PROGRESS NOTES
Progress Note - Infectious Disease   Annie Murray 71 y o  female MRN: [id number]  Unit/Bed#:  Encounter: [id number]      Impression/Plan:  1  Latent tuberculosis  Patient has a known exposure to tuberculosis as a child with positive PPD in the past   No acute signs of active TB at this time and prior chest x-ray was negative  She has eventually plan for biologic therapy and so recommended for latent TB therapy  Low suspicion for side effects to medication  Prior elevation to LFTs noted which is improving  Continue INH/B6 with plan through April 2020  Will obtain repeat LFTs in 2 weeks/prior to visit  Advised against alcohol use  Continue to monitor for abdominal symptoms and skin discoloration along with tingling/pain/numbness in the hands and feet while on INH  Additional care as below  Patient is to follow-up in the office in 3 weeks    2  Psoriatic arthritis  Patient with underlying psoriatic arthritis and possibility of starting biologic therapy  Follow-up with dermatology as planned in January  3  Dizziness, fatigue, weight loss  Patient notes new symptoms as of Saturday  Suspect weight loss may be due to starting INH/B6 in addition to her other medications  Given blood pressure today and symptoms with standing suspect patient is developing orthostatics hypotension from her blood pressure medications  Neuro exam otherwise unremarkable  Patient has already stop amlodipine  Recommend continuing to hold amlodipine  Continue losartan for now  Recommended patient check blood pressures every morning, with episodes of dizziness and fatigue and to document these values  Recommended that she reach out to her primary care doctor in the next few days with these numbers for further titration of her blood pressure medications  Continue treatment of latent TB otherwise as above    Above plan discussed in detail with the patient    Will forward office visit to primary care provider    RTO in 3-4 weeks to follow up on symptoms  Antibiotics:  INH/B6--planned through April 2020    Subjective: This patient is a 69-year-old female who presents to our office for follow-up regarding latent tuberculosis therapy  Patient was last seen in the office on 11/21  She is plan to continue therapy through April 2020  She reports compliance with her medication with INH and B6  She denies having any fevers, chills, nausea, vomiting, diarrhea, shortness of breath or chest pain  The patient unfortunately has noted weight loss about 8 pounds since August   She is not actively trying to lose any weight  Since last Saturday she has noted dizziness particularly when she goes from a seated to a standing position and at night when she gets up to go to the bathroom  She also reports ongoing fatigue since Saturday  She denies having any upper respiratory symptoms  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  She denies any numbness or tingling in her feet or hands  She also reports some mild pain in her midback and subjective weakness in her ankles  She denies starting any new medications or over-the-counter supplements and majority of her medications that she is on now have been present for at least a year  She has started taking her blood pressure at home and has found that during these episodes of feeling fatigued and dizzy that her blood pressures are low  Here in the office today blood pressure is 100/65  The patient is hopeful to continue treatment for latent tuberculosis as there is plans for biologic therapy in the future  ROS: A complete 12 point ROS is negative other than that noted in the HPI    Followup portions patient history reviewed and updated as:   Allergies, current medications, past medical history, past social history, past surgical history, and the problem list    Objective:  Vitals:  Vitals:    12/11/19 0851   BP: 100/65   Pulse: 61   Resp: 17   Temp: (!) 97 1 °F (36 2 °C) Weight: 55 8 kg (123 lb)   Height: 5' 3" (1 6 m)       Physical Exam:   General Appearance:  Alert, interactive, appearing well, nontoxic, no acute distress  Patient provides an adequate history  Throat: Oropharynx moist without lesions  Lungs:   Clear to auscultation bilaterally; no audible wheezes, rhonchi or rales; respirations unlabored on room air   Heart:  RRR; no murmur, rub or gallop   Abdomen:   Soft, non-tender, non-distended, positive bowel sounds  Extremities: No clubbing, cyanosis or edema   Neuro: Cranial nerves are intact, I am unable to be create her dizziness in a seated position with moving the head  Motor strength is intact in all of her extremities  Patient's dizziness returns when she goes into a standing position from seeded  Negative Romberg sign  Her gait is otherwise unremarkable  Grossly intact sensation in her fingers  Skin: No new rashes or lesions  No new draining wounds         Labs, Imaging, & Other studies:   All pertinent labs and imaging studies were personally reviewed    Lab Results   Component Value Date    K 4 1 11/29/2019     11/29/2019    CO2 29 11/29/2019    BUN 20 11/29/2019    CREATININE 1 11 11/29/2019    GLUF 86 11/29/2019    CALCIUM 9 5 11/29/2019    AST 84 (H) 12/07/2019     (H) 12/07/2019    ALKPHOS 54 12/07/2019    EGFR 51 11/29/2019     No results found for: WBC, HGB, HCT, MCV, PLTNo results found for: Neto Vasquez

## 2019-12-11 NOTE — PATIENT INSTRUCTIONS
Continue INH/B6  Please obtain liver function test every 2 weeks until next appt  Follow up on Jan 7  Please call office if you develop nausea, vomiting, abdominal pain, or jaundice  If you receive a survey, we encourage you to fill it out   They help us to know how we can better serve our patients! :)

## 2019-12-26 ENCOUNTER — TRANSCRIBE ORDERS (OUTPATIENT)
Dept: LAB | Facility: CLINIC | Age: 69
End: 2019-12-26

## 2019-12-26 ENCOUNTER — APPOINTMENT (OUTPATIENT)
Dept: LAB | Facility: CLINIC | Age: 69
End: 2019-12-26
Payer: MEDICARE

## 2020-01-06 RX ORDER — HALOBETASOL PROPIONATE 0.05 %
OINTMENT (GRAM) TOPICAL
COMMUNITY
Start: 2019-12-12

## 2020-01-07 ENCOUNTER — OFFICE VISIT (OUTPATIENT)
Dept: INFECTIOUS DISEASES | Facility: CLINIC | Age: 70
End: 2020-01-07
Payer: MEDICARE

## 2020-01-07 VITALS
WEIGHT: 122.6 LBS | SYSTOLIC BLOOD PRESSURE: 98 MMHG | HEART RATE: 66 BPM | BODY MASS INDEX: 21.72 KG/M2 | TEMPERATURE: 97.3 F | DIASTOLIC BLOOD PRESSURE: 62 MMHG

## 2020-01-07 DIAGNOSIS — Z22.7 TB LUNG, LATENT: Primary | ICD-10-CM

## 2020-01-07 DIAGNOSIS — L40.50 PSORIATIC ARTHRITIS (HCC): ICD-10-CM

## 2020-01-07 PROCEDURE — 99213 OFFICE O/P EST LOW 20 MIN: CPT | Performed by: PHYSICIAN ASSISTANT

## 2020-01-07 NOTE — PROGRESS NOTES
Assessment/Plan:    TB lung, latent  Serial positive PPD in setting of known TB exposure as a child   No signs or symptoms of active TB   Chest x-ray negative   High risk for reactivation in setting of possible biological therapy for # 2   Patient noted to have elevated LFTs last month  They are now coming down and the patient continues to tolerate the medication  Rec:       ? Continue INH/pyridoxine with plan to continue for 9 months total (through April 2020)  ? RTO in one month  ? LFTs every 2 weeks until next appointment  ? Advised ongoing alcohol avoidance while on INH  ? Advised monitoring for any new abdominal pain, nausea, vomiting, yellow discoloration of skin, or tingling/pain/numbness of hands or feet while on INH        Psoriatic arthritis ([facility] [address] )  Currently on apremilast/Otezla   May need to pursue biologics  Patient's next dermatology follow-up is in January 2020  Diagnoses and all orders for this visit:    TB lung, latent  -     Hepatic function panel; Standing  -     Hepatic function panel    Psoriatic arthritis ([address] )    Other orders  -     halobetasol (ULTRAVATE) 0 05 % ointment          Subjective:      Patient ID: Rubén Lauren is a 71 y o  female  HPI  51-year-old female presents office follow-up today regarding TB  She was last seen in office 1 month ago at which time her LFTs had dramatically risen  They were rechecked  prior to her last appointment and had already been trending downward  She was maintained on her current regimen of INH and B6  She states since her last appointment she continues to tolerate the medications  Her LFTs have again decreased  She denies any fevers chills rash diarrhea nausea vomiting or paresthesias  On her last visit she reported some dizziness  It was noted that her blood pressure was on the low side  She had lost some weight additionally and was maintained on the same blood pressure medication    She states her blood pressure is still been running low and she is appointment next week with her PCP to address this  The following portions of the patient's history were reviewed and updated as appropriate: allergies, current medications, past family history, past medical history, past social history, past surgical history and problem list     Review of Systems   Constitutional: Negative for chills and fever  HENT: Negative for mouth sores  Respiratory: Negative for cough and shortness of breath  Cardiovascular: Negative for chest pain, palpitations and leg swelling  Gastrointestinal: Negative for abdominal pain, diarrhea, nausea and vomiting  Genitourinary: Negative for difficulty urinating, dysuria and frequency  Musculoskeletal: Negative for arthralgias, back pain, joint swelling and myalgias  Skin: Negative for rash and wound  Neurological: Negative for headaches  Psychiatric/Behavioral: Negative for behavioral problems and confusion  Objective:      BP 98/62   Pulse 66   Temp (!) 97 3 °F (36 3 °C)   Wt 55 6 kg (122 lb 9 6 oz)   BMI 21 72 kg/m²          Physical Exam   Constitutional: She is oriented to person, place, and time  She appears well-developed and well-nourished  No distress  HENT:   Head: Normocephalic and atraumatic  Right Ear: External ear normal    Left Ear: External ear normal    Nose: Nose normal    Mouth/Throat: Oropharynx is clear and moist  No oropharyngeal exudate  Eyes: Conjunctivae are normal  Right eye exhibits no discharge  Left eye exhibits no discharge  No scleral icterus  Neck: Normal range of motion  Neck supple  Cardiovascular: Normal rate, regular rhythm and normal heart sounds  Pulmonary/Chest: Effort normal and breath sounds normal  No stridor  No respiratory distress  She has no wheezes  She has no rales  She exhibits no tenderness  Abdominal: Soft  Bowel sounds are normal  She exhibits no distension  There is no tenderness  Musculoskeletal: Normal range of motion   She exhibits no edema  Neurological: She is alert and oriented to person, place, and time  Skin: Skin is warm and dry  No rash noted  She is not diaphoretic  No erythema  Psychiatric: She has a normal mood and affect  Her behavior is normal    Nursing note and vitals reviewed        Labs:   12/26/19  AST: 69 (84, 136)  ALT: 91 (151, 162)  Alk phos: 54

## 2020-01-07 NOTE — ASSESSMENT & PLAN NOTE
Serial positive PPD in setting of known TB exposure as a child   No signs or symptoms of active TB   Chest x-ray negative   High risk for reactivation in setting of possible biological therapy for # 2   Patient noted to have elevated LFTs last month  They are now coming down and the patient continues to tolerate the medication  Rec:       ? Continue INH/pyridoxine with plan to continue for 9 months total (through April 2020)  ? RTO in one month  ? LFTs every 2 weeks until next appointment  ? Advised ongoing alcohol avoidance while on INH  ? Advised monitoring for any new abdominal pain, nausea, vomiting, yellow discoloration of skin, or tingling/pain/numbness of hands or feet while on INH

## 2020-01-07 NOTE — PATIENT INSTRUCTIONS
Continue INH and B6 as prescribed  Labs every 2 weeks  Return to office for follow-up in 4- 5 weeks  Please call in the interim with any questions or concerns

## 2020-01-18 ENCOUNTER — TRANSCRIBE ORDERS (OUTPATIENT)
Dept: LAB | Facility: CLINIC | Age: 70
End: 2020-01-18

## 2020-01-18 ENCOUNTER — APPOINTMENT (OUTPATIENT)
Dept: LAB | Facility: CLINIC | Age: 70
End: 2020-01-18
Payer: MEDICARE

## 2020-01-18 DIAGNOSIS — R63.4 WEIGHT LOSS: ICD-10-CM

## 2020-01-18 DIAGNOSIS — D75.839 THROMBOCYTOSIS: Primary | ICD-10-CM

## 2020-01-18 DIAGNOSIS — D75.839 THROMBOCYTOSIS: ICD-10-CM

## 2020-01-18 LAB
BASOPHILS # BLD AUTO: 0.1 THOUSANDS/ΜL (ref 0–0.1)
BASOPHILS NFR BLD AUTO: 2 % (ref 0–1)
EOSINOPHIL # BLD AUTO: 0.24 THOUSAND/ΜL (ref 0–0.61)
EOSINOPHIL NFR BLD AUTO: 4 % (ref 0–6)
ERYTHROCYTE [DISTWIDTH] IN BLOOD BY AUTOMATED COUNT: 14.5 % (ref 11.6–15.1)
HCT VFR BLD AUTO: 39.7 % (ref 34.8–46.1)
HGB BLD-MCNC: 12.7 G/DL (ref 11.5–15.4)
IMM GRANULOCYTES # BLD AUTO: 0.01 THOUSAND/UL (ref 0–0.2)
IMM GRANULOCYTES NFR BLD AUTO: 0 % (ref 0–2)
LYMPHOCYTES # BLD AUTO: 0.97 THOUSANDS/ΜL (ref 0.6–4.47)
LYMPHOCYTES NFR BLD AUTO: 17 % (ref 14–44)
MCH RBC QN AUTO: 30.3 PG (ref 26.8–34.3)
MCHC RBC AUTO-ENTMCNC: 32 G/DL (ref 31.4–37.4)
MCV RBC AUTO: 95 FL (ref 82–98)
MONOCYTES # BLD AUTO: 0.52 THOUSAND/ΜL (ref 0.17–1.22)
MONOCYTES NFR BLD AUTO: 9 % (ref 4–12)
NEUTROPHILS # BLD AUTO: 3.88 THOUSANDS/ΜL (ref 1.85–7.62)
NEUTS SEG NFR BLD AUTO: 68 % (ref 43–75)
NRBC BLD AUTO-RTO: 0 /100 WBCS
PLATELET # BLD AUTO: 717 THOUSANDS/UL (ref 149–390)
PMV BLD AUTO: 9.1 FL (ref 8.9–12.7)
RBC # BLD AUTO: 4.19 MILLION/UL (ref 3.81–5.12)
WBC # BLD AUTO: 5.72 THOUSAND/UL (ref 4.31–10.16)

## 2020-01-18 PROCEDURE — 85025 COMPLETE CBC W/AUTO DIFF WBC: CPT

## 2020-01-28 ENCOUNTER — APPOINTMENT (OUTPATIENT)
Dept: LAB | Facility: CLINIC | Age: 70
End: 2020-01-28
Payer: MEDICARE

## 2020-01-28 DIAGNOSIS — D75.839 THROMBOCYTOSIS: ICD-10-CM

## 2020-01-28 DIAGNOSIS — R63.4 WEIGHT LOSS: ICD-10-CM

## 2020-01-28 LAB
ALBUMIN SERPL BCP-MCNC: 3.9 G/DL (ref 3.5–5)
ALP SERPL-CCNC: 54 U/L (ref 46–116)
ALT SERPL W P-5'-P-CCNC: 59 U/L (ref 12–78)
AST SERPL W P-5'-P-CCNC: 47 U/L (ref 5–45)
BASOPHILS # BLD AUTO: 0.07 THOUSANDS/ΜL (ref 0–0.1)
BASOPHILS NFR BLD AUTO: 1 % (ref 0–1)
BILIRUB DIRECT SERPL-MCNC: 0.08 MG/DL (ref 0–0.2)
BILIRUB SERPL-MCNC: 0.26 MG/DL (ref 0.2–1)
EOSINOPHIL # BLD AUTO: 0.15 THOUSAND/ΜL (ref 0–0.61)
EOSINOPHIL NFR BLD AUTO: 3 % (ref 0–6)
ERYTHROCYTE [DISTWIDTH] IN BLOOD BY AUTOMATED COUNT: 14.5 % (ref 11.6–15.1)
FERRITIN SERPL-MCNC: 56 NG/ML (ref 8–388)
HCT VFR BLD AUTO: 39.1 % (ref 34.8–46.1)
HGB BLD-MCNC: 12.5 G/DL (ref 11.5–15.4)
IMM GRANULOCYTES # BLD AUTO: 0.02 THOUSAND/UL (ref 0–0.2)
IMM GRANULOCYTES NFR BLD AUTO: 0 % (ref 0–2)
IRON SATN MFR SERPL: 20 %
IRON SERPL-MCNC: 58 UG/DL (ref 50–170)
LYMPHOCYTES # BLD AUTO: 0.79 THOUSANDS/ΜL (ref 0.6–4.47)
LYMPHOCYTES NFR BLD AUTO: 13 % (ref 14–44)
MCH RBC QN AUTO: 30.4 PG (ref 26.8–34.3)
MCHC RBC AUTO-ENTMCNC: 32 G/DL (ref 31.4–37.4)
MCV RBC AUTO: 95 FL (ref 82–98)
MONOCYTES # BLD AUTO: 0.46 THOUSAND/ΜL (ref 0.17–1.22)
MONOCYTES NFR BLD AUTO: 8 % (ref 4–12)
NEUTROPHILS # BLD AUTO: 4.55 THOUSANDS/ΜL (ref 1.85–7.62)
NEUTS SEG NFR BLD AUTO: 75 % (ref 43–75)
NRBC BLD AUTO-RTO: 0 /100 WBCS
PLATELET # BLD AUTO: 776 THOUSANDS/UL (ref 149–390)
PMV BLD AUTO: 9.2 FL (ref 8.9–12.7)
PROT SERPL-MCNC: 6.8 G/DL (ref 6.4–8.2)
RBC # BLD AUTO: 4.11 MILLION/UL (ref 3.81–5.12)
TIBC SERPL-MCNC: 288 UG/DL (ref 250–450)
WBC # BLD AUTO: 6.04 THOUSAND/UL (ref 4.31–10.16)

## 2020-01-28 PROCEDURE — 83540 ASSAY OF IRON: CPT

## 2020-01-28 PROCEDURE — 36415 COLL VENOUS BLD VENIPUNCTURE: CPT | Performed by: PHYSICIAN ASSISTANT

## 2020-01-28 PROCEDURE — 83550 IRON BINDING TEST: CPT

## 2020-01-28 PROCEDURE — 85025 COMPLETE CBC W/AUTO DIFF WBC: CPT

## 2020-01-28 PROCEDURE — 80076 HEPATIC FUNCTION PANEL: CPT | Performed by: PHYSICIAN ASSISTANT

## 2020-01-28 PROCEDURE — 82728 ASSAY OF FERRITIN: CPT

## 2020-02-13 DIAGNOSIS — Z22.7 TB LUNG, LATENT: Primary | ICD-10-CM

## 2020-02-15 ENCOUNTER — APPOINTMENT (OUTPATIENT)
Dept: LAB | Facility: CLINIC | Age: 70
End: 2020-02-15
Payer: MEDICARE

## 2020-02-15 LAB
ALBUMIN SERPL BCP-MCNC: 3.5 G/DL (ref 3.5–5)
ALP SERPL-CCNC: 58 U/L (ref 46–116)
ALT SERPL W P-5'-P-CCNC: 41 U/L (ref 12–78)
AST SERPL W P-5'-P-CCNC: 33 U/L (ref 5–45)
BILIRUB DIRECT SERPL-MCNC: 0.11 MG/DL (ref 0–0.2)
BILIRUB SERPL-MCNC: 0.35 MG/DL (ref 0.2–1)
PROT SERPL-MCNC: 6.8 G/DL (ref 6.4–8.2)

## 2020-02-15 PROCEDURE — 80076 HEPATIC FUNCTION PANEL: CPT

## 2020-02-15 PROCEDURE — 36415 COLL VENOUS BLD VENIPUNCTURE: CPT

## 2020-02-18 ENCOUNTER — TELEPHONE (OUTPATIENT)
Dept: SURGICAL ONCOLOGY | Facility: CLINIC | Age: 70
End: 2020-02-18

## 2020-02-18 ENCOUNTER — OFFICE VISIT (OUTPATIENT)
Dept: INFECTIOUS DISEASES | Facility: CLINIC | Age: 70
End: 2020-02-18
Payer: MEDICARE

## 2020-02-18 VITALS
TEMPERATURE: 62.6 F | BODY MASS INDEX: 21.26 KG/M2 | RESPIRATION RATE: 17 BRPM | WEIGHT: 120 LBS | DIASTOLIC BLOOD PRESSURE: 60 MMHG | SYSTOLIC BLOOD PRESSURE: 100 MMHG | HEIGHT: 63 IN | HEART RATE: 74 BPM

## 2020-02-18 DIAGNOSIS — Z22.7 TB LUNG, LATENT: Primary | ICD-10-CM

## 2020-02-18 DIAGNOSIS — L40.50 PSORIATIC ARTHRITIS (HCC): ICD-10-CM

## 2020-02-18 PROCEDURE — 99213 OFFICE O/P EST LOW 20 MIN: CPT | Performed by: PHYSICIAN ASSISTANT

## 2020-02-18 NOTE — PATIENT INSTRUCTIONS
- continue isoniazid and B6 as ordered  - LFTs in 1 month  - return to office in 1 month  - please call with any questions or concerns in the interim

## 2020-02-18 NOTE — ASSESSMENT & PLAN NOTE
Currently on apremilast/Otezla  Patient recently followed up with dermatology in January  At the present time she is doing well on [place]  No immediate plans to start biologic

## 2020-02-18 NOTE — ASSESSMENT & PLAN NOTE
Serial positive PPD in setting of known TB exposure as a child   No signs or symptoms of active TB   Chest x-ray negative   High risk for reactivation in setting of possible biological therapy for # 2   Patient noted to have a brief elevation of LFTs which have now returned to normal   Patient tolerating the medications without difficulty      Rec:       ? Continue INH/pyridoxine with plan to continue for 9 months total (through April 2020)  ? RTO in one month  ? LFTs monthly on INH/B6  ? Advised ongoing alcohol avoidance while on INH  ? Advised monitoring for any new abdominal pain, nausea, vomiting, yellow discoloration of skin, or tingling/pain/numbness of hands or feet while on INH

## 2020-02-18 NOTE — PROGRESS NOTES
Assessment/Plan:    TB lung, latent  Serial positive PPD in setting of known TB exposure as a child   No signs or symptoms of active TB   Chest x-ray negative   High risk for reactivation in setting of possible biological therapy for # 2   Patient noted to have a brief elevation of LFTs which have now returned to normal   Patient tolerating the medications without difficulty      Rec:       ? Continue INH/pyridoxine with plan to continue for 9 months total (through April 2020)  ? RTO in one month  ? LFTs monthly on INH/B6  ? Advised ongoing alcohol avoidance while on INH  ? Advised monitoring for any new abdominal pain, nausea, vomiting, yellow discoloration of skin, or tingling/pain/numbness of hands or feet while on INH  Psoriatic arthritis ([address] )  Currently on apremilast/Otezla  Patient recently followed up with dermatology in January  At the present time she is doing well on [place]  No immediate plans to start biologic  Diagnoses and all orders for this visit:    TB lung, latent  -     Hepatic function panel; Future    Psoriatic arthritis (H[facility])          Subjective:      Patient ID: Mary Dey is a 71 y o  female  HPI  19-year-old female with psoriatic arthritis presents for office follow-up today regarding latent TB  Patient was last seen in the office 1 month ago  Since that time she is doing well  She recently completed a course of oral antibiotics for sinusitis  She continues to take her INH and B6 without difficulty  She denies any fevers chills rash nausea vomiting diarrhea or paresthesias  The following portions of the patient's history were reviewed and updated as appropriate: allergies, current medications, past family history, past medical history, past social history, past surgical history and problem list     Review of Systems   Constitutional: Negative for chills and fever  HENT: Negative for mouth sores  Respiratory: Negative for cough and shortness of breath  Cardiovascular: Negative for chest pain, palpitations and leg swelling  Gastrointestinal: Negative for abdominal pain, diarrhea, nausea and vomiting  Genitourinary: Negative for difficulty urinating, dysuria and frequency  Musculoskeletal: Negative for arthralgias, back pain, joint swelling and myalgias  Skin: Negative for rash and wound  Neurological: Negative for headaches  Psychiatric/Behavioral: Negative for behavioral problems and confusion  Objective:      /60   Pulse 74   Temp (!) 62 6 °F (17 °C)   Resp 17   Ht 5' 3" (1 6 m)   Wt 54 4 kg (120 lb)   BMI 21 26 kg/m²          Physical Exam   Constitutional: She is oriented to person, place, and time  She appears well-developed and well-nourished  No distress  HENT:   Head: Normocephalic and atraumatic  Right Ear: External ear normal    Left Ear: External ear normal    Nose: Nose normal    Mouth/Throat: Oropharynx is clear and moist  No oropharyngeal exudate  Eyes: Conjunctivae are normal  Right eye exhibits no discharge  Left eye exhibits no discharge  No scleral icterus  Neck: Normal range of motion  Neck supple  Cardiovascular: Normal rate, regular rhythm and normal heart sounds  Pulmonary/Chest: Effort normal and breath sounds normal  No stridor  No respiratory distress  She has no wheezes  She has no rales  She exhibits no tenderness  Abdominal: Soft  Bowel sounds are normal  She exhibits no distension  There is no tenderness  Musculoskeletal: Normal range of motion  She exhibits no edema  Neurological: She is alert and oriented to person, place, and time  Skin: Skin is warm and dry  No rash noted  She is not diaphoretic  No erythema  No pallor  Psychiatric: She has a normal mood and affect  Her behavior is normal    Nursing note and vitals reviewed        Labs:  02/15/2020  Total bilirubin 0 35  Direct bilirubin 0 11  Alk-phos 58  AST 33  ALT 41

## 2020-02-18 NOTE — TELEPHONE ENCOUNTER
New Patient Encounter    New Patient Intake Form   Patient Details:  Tran Henriquez  1950  [id number]    Background Information:  [address] starts by opening a telephone encounter and gathering the following information   Who is calling to schedule? If not self, relationship to patient? slef   Referring Provider Dr Mindi Sykes   What is the diagnosis? High platelets    Is this diagnosis confirmed Yes   Is there a confirmed diagnosis from a biopsy/tissue reviewed by pathology? No   Is there any prior history of Cancer? No   If yes, please explain na   When was the diagnosis? 2/18   Is patient aware of diagnosis? Yes   Reason for visit? NP DX   Have you had any testing done? If so: when, where? Yes   Are records in @Pay? yes   Was the patient told to bring a disk? no   Scheduling Information:   Preferred [place]: [facility]     Requesting Specific Provider? Dr Jade Rodas   Are there any dates/time the patient cannot be seen? Works until 2, needs appt after 2      Miscellaneous: Pt stated that Dr Mindi Sykes sent blood work to Dr Jade Rodas over a week ago   After completing the above information, please route to Financial Counselor and the appropriate Nurse Navigator for review

## 2020-03-17 ENCOUNTER — APPOINTMENT (OUTPATIENT)
Dept: LAB | Facility: CLINIC | Age: 70
End: 2020-03-17
Payer: MEDICARE

## 2020-03-17 ENCOUNTER — TRANSCRIBE ORDERS (OUTPATIENT)
Dept: LAB | Facility: CLINIC | Age: 70
End: 2020-03-17

## 2020-03-17 DIAGNOSIS — Z22.7 TB LUNG, LATENT: ICD-10-CM

## 2020-03-17 LAB
ALBUMIN SERPL BCP-MCNC: 3.7 G/DL (ref 3.5–5)
ALP SERPL-CCNC: 47 U/L (ref 46–116)
ALT SERPL W P-5'-P-CCNC: 39 U/L (ref 12–78)
AST SERPL W P-5'-P-CCNC: 32 U/L (ref 5–45)
BILIRUB DIRECT SERPL-MCNC: 0.15 MG/DL (ref 0–0.2)
BILIRUB SERPL-MCNC: 0.31 MG/DL (ref 0.2–1)
PROT SERPL-MCNC: 6.7 G/DL (ref 6.4–8.2)

## 2020-03-17 PROCEDURE — 80076 HEPATIC FUNCTION PANEL: CPT

## 2020-03-17 PROCEDURE — 36415 COLL VENOUS BLD VENIPUNCTURE: CPT

## 2020-04-15 ENCOUNTER — TELEPHONE (OUTPATIENT)
Dept: INFECTIOUS DISEASES | Facility: CLINIC | Age: 70
End: 2020-04-15

## 2020-04-16 ENCOUNTER — TRANSCRIBE ORDERS (OUTPATIENT)
Dept: LAB | Facility: CLINIC | Age: 70
End: 2020-04-16

## 2020-04-16 ENCOUNTER — APPOINTMENT (OUTPATIENT)
Dept: LAB | Facility: CLINIC | Age: 70
End: 2020-04-16
Payer: MEDICARE

## 2020-04-16 DIAGNOSIS — Z22.7 TB LUNG, LATENT: Primary | ICD-10-CM

## 2020-04-16 LAB
ALBUMIN SERPL BCP-MCNC: 3.8 G/DL (ref 3.5–5)
ALP SERPL-CCNC: 45 U/L (ref 46–116)
ALT SERPL W P-5'-P-CCNC: 36 U/L (ref 12–78)
AST SERPL W P-5'-P-CCNC: 31 U/L (ref 5–45)
BILIRUB DIRECT SERPL-MCNC: 0.06 MG/DL (ref 0–0.2)
BILIRUB SERPL-MCNC: 0.27 MG/DL (ref 0.2–1)
PROT SERPL-MCNC: 6.8 G/DL (ref 6.4–8.2)

## 2020-04-16 PROCEDURE — 80076 HEPATIC FUNCTION PANEL: CPT

## 2020-04-16 PROCEDURE — 36415 COLL VENOUS BLD VENIPUNCTURE: CPT

## 2020-04-21 ENCOUNTER — TELEMEDICINE (OUTPATIENT)
Dept: INFECTIOUS DISEASES | Facility: CLINIC | Age: 70
End: 2020-04-21
Payer: MEDICARE

## 2020-04-21 DIAGNOSIS — Z22.7 TB LUNG, LATENT: Primary | ICD-10-CM

## 2020-04-21 DIAGNOSIS — L40.50 PSORIATIC ARTHRITIS (HCC): ICD-10-CM

## 2020-04-21 PROCEDURE — 99441 PR PHYS/QHP TELEPHONE EVALUATION 5-10 MIN: CPT | Performed by: PHYSICIAN ASSISTANT

## 2020-05-26 ENCOUNTER — TELEPHONE (OUTPATIENT)
Dept: HEMATOLOGY ONCOLOGY | Facility: CLINIC | Age: 70
End: 2020-05-26

## 2020-05-27 ENCOUNTER — TELEPHONE (OUTPATIENT)
Dept: HEMATOLOGY ONCOLOGY | Facility: HOSPITAL | Age: 70
End: 2020-05-27

## 2020-05-27 DIAGNOSIS — D75.839 THROMBOCYTOSIS: Primary | ICD-10-CM

## 2020-05-28 ENCOUNTER — LAB (OUTPATIENT)
Dept: LAB | Facility: CLINIC | Age: 70
End: 2020-05-28
Payer: MEDICARE

## 2020-05-28 ENCOUNTER — CONSULT (OUTPATIENT)
Dept: HEMATOLOGY ONCOLOGY | Facility: CLINIC | Age: 70
End: 2020-05-28
Payer: MEDICARE

## 2020-05-28 ENCOUNTER — TRANSCRIBE ORDERS (OUTPATIENT)
Dept: LAB | Facility: CLINIC | Age: 70
End: 2020-05-28

## 2020-05-28 VITALS
WEIGHT: 122 LBS | SYSTOLIC BLOOD PRESSURE: 114 MMHG | OXYGEN SATURATION: 98 % | BODY MASS INDEX: 22.45 KG/M2 | DIASTOLIC BLOOD PRESSURE: 74 MMHG | HEIGHT: 62 IN | RESPIRATION RATE: 16 BRPM | HEART RATE: 79 BPM | TEMPERATURE: 98.5 F

## 2020-05-28 DIAGNOSIS — D75.839 THROMBOCYTOSIS: Primary | ICD-10-CM

## 2020-05-28 DIAGNOSIS — D75.839 THROMBOCYTOSIS: ICD-10-CM

## 2020-05-28 LAB
ALBUMIN SERPL BCP-MCNC: 3.8 G/DL (ref 3.5–5)
ALP SERPL-CCNC: 51 U/L (ref 46–116)
ALT SERPL W P-5'-P-CCNC: 40 U/L (ref 12–78)
ANION GAP SERPL CALCULATED.3IONS-SCNC: 8 MMOL/L (ref 4–13)
AST SERPL W P-5'-P-CCNC: 32 U/L (ref 5–45)
ATRIAL RATE: 74 BPM
BASOPHILS # BLD AUTO: 0.08 THOUSANDS/ΜL (ref 0–0.1)
BASOPHILS NFR BLD AUTO: 1 % (ref 0–1)
BILIRUB SERPL-MCNC: 0.38 MG/DL (ref 0.2–1)
BUN SERPL-MCNC: 22 MG/DL (ref 5–25)
CALCIUM SERPL-MCNC: 9.3 MG/DL (ref 8.3–10.1)
CHLORIDE SERPL-SCNC: 108 MMOL/L (ref 100–108)
CO2 SERPL-SCNC: 28 MMOL/L (ref 21–32)
CREAT SERPL-MCNC: 1.05 MG/DL (ref 0.6–1.3)
EOSINOPHIL # BLD AUTO: 0.19 THOUSAND/ΜL (ref 0–0.61)
EOSINOPHIL NFR BLD AUTO: 3 % (ref 0–6)
ERYTHROCYTE [DISTWIDTH] IN BLOOD BY AUTOMATED COUNT: 14.5 % (ref 11.6–15.1)
FERRITIN SERPL-MCNC: 39 NG/ML (ref 8–388)
GFR SERPL CREATININE-BSD FRML MDRD: 54 ML/MIN/1.73SQ M
GLUCOSE P FAST SERPL-MCNC: 80 MG/DL (ref 65–99)
HCT VFR BLD AUTO: 40.8 % (ref 34.8–46.1)
HGB BLD-MCNC: 13.1 G/DL (ref 11.5–15.4)
IMM GRANULOCYTES # BLD AUTO: 0.04 THOUSAND/UL (ref 0–0.2)
IMM GRANULOCYTES NFR BLD AUTO: 1 % (ref 0–2)
IRON SATN MFR SERPL: 27 %
IRON SERPL-MCNC: 81 UG/DL (ref 50–170)
LYMPHOCYTES # BLD AUTO: 0.93 THOUSANDS/ΜL (ref 0.6–4.47)
LYMPHOCYTES NFR BLD AUTO: 13 % (ref 14–44)
MCH RBC QN AUTO: 30.7 PG (ref 26.8–34.3)
MCHC RBC AUTO-ENTMCNC: 32.1 G/DL (ref 31.4–37.4)
MCV RBC AUTO: 96 FL (ref 82–98)
MONOCYTES # BLD AUTO: 0.64 THOUSAND/ΜL (ref 0.17–1.22)
MONOCYTES NFR BLD AUTO: 9 % (ref 4–12)
NEUTROPHILS # BLD AUTO: 5.32 THOUSANDS/ΜL (ref 1.85–7.62)
NEUTS SEG NFR BLD AUTO: 73 % (ref 43–75)
NRBC BLD AUTO-RTO: 0 /100 WBCS
P AXIS: 82 DEGREES
PLATELET # BLD AUTO: 791 THOUSANDS/UL (ref 149–390)
PMV BLD AUTO: 9.1 FL (ref 8.9–12.7)
POTASSIUM SERPL-SCNC: 4.3 MMOL/L (ref 3.5–5.3)
PR INTERVAL: 180 MS
PROT SERPL-MCNC: 6.8 G/DL (ref 6.4–8.2)
QRS AXIS: 28 DEGREES
QRSD INTERVAL: 80 MS
QT INTERVAL: 362 MS
QTC INTERVAL: 401 MS
RBC # BLD AUTO: 4.27 MILLION/UL (ref 3.81–5.12)
SODIUM SERPL-SCNC: 144 MMOL/L (ref 136–145)
T WAVE AXIS: 73 DEGREES
TIBC SERPL-MCNC: 302 UG/DL (ref 250–450)
VENTRICULAR RATE: 74 BPM
WBC # BLD AUTO: 7.2 THOUSAND/UL (ref 4.31–10.16)

## 2020-05-28 PROCEDURE — 82728 ASSAY OF FERRITIN: CPT

## 2020-05-28 PROCEDURE — 83550 IRON BINDING TEST: CPT

## 2020-05-28 PROCEDURE — 93005 ELECTROCARDIOGRAM TRACING: CPT

## 2020-05-28 PROCEDURE — 80053 COMPREHEN METABOLIC PANEL: CPT

## 2020-05-28 PROCEDURE — 85025 COMPLETE CBC W/AUTO DIFF WBC: CPT

## 2020-05-28 PROCEDURE — 99205 OFFICE O/P NEW HI 60 MIN: CPT | Performed by: INTERNAL MEDICINE

## 2020-05-28 PROCEDURE — 93010 ELECTROCARDIOGRAM REPORT: CPT | Performed by: INTERNAL MEDICINE

## 2020-05-28 PROCEDURE — 36415 COLL VENOUS BLD VENIPUNCTURE: CPT

## 2020-05-28 PROCEDURE — 83918 ORGANIC ACIDS TOTAL QUANT: CPT

## 2020-05-28 PROCEDURE — 83540 ASSAY OF IRON: CPT

## 2020-05-31 LAB
METHYLMALONATE SERPL-SCNC: 309 NMOL/L (ref 0–378)
SL AMB DISCLAIMER: NORMAL

## 2020-06-02 LAB
SCAN RESULT: NORMAL

## 2020-06-08 LAB — MISCELLANEOUS LAB TEST RESULT: NORMAL

## 2020-06-25 ENCOUNTER — APPOINTMENT (OUTPATIENT)
Dept: LAB | Facility: CLINIC | Age: 70
End: 2020-06-25
Payer: MEDICARE

## 2020-07-01 NOTE — PROGRESS NOTES
Hematology/Oncology Outpatient Follow- up Note  Lobito Madera, 1950, [id number]  2020        Chief Complaint   Patient presents with    Follow-up   Thrombocytosis; Jak2 positive     HPI:  Lobito Madera is a 72 yo female who was seen for initial consultation on 2020 regarding thrombocytosis  Her thrombocytosis was significant with platelet count in the range of 700 on several occasions  She has an elevated platelet count dating back to 2020  The remainder of her cell lines are WNL  Given that she has had consistently elevated platelets over the past 7 months, a myeloproliferative workup was ordered  Results of this indicate a positive JAK2 mutation confirming a diagnosis of essential thrombocytosis  Previous Hematologic/ Oncologic History:    Workup     Current Hematologic/ Oncologic Treatment:    Start Hydrea 500 mg daily     ECO - Asymptomatic    Interval History:  Patient presents for follow-up  She reports that she is feeling well, denies any concerning symptoms  Most recent CBC resulted on 2020, shows WBC 7 4, hemoglobin 13 1, hematocrit 40 4, normal MCV, platelets 630  CBC prior done on 2020 shows WBC 7 2, hemoglobin 13 1, hematocrit 40 8, normal MCV, platelets 537  Her myeloproliferative workup was completed  Her iron panel and ferritin were within normal limits, flow cytometry was negative, CALR mutation negative, BCR/ABL, PCR showed no evidence of rearrangement  She was positive for JAK2   V617 F mutation  Cancer Staging:  Cancer Staging  No matching staging information was found for the patient  Molecular Testing:     Test Results:    Imaging: No results found      Labs:   Lab Results   Component Value Date    WBC 7 40 2020    HGB 13 1 2020    HCT 40 4 2020    MCV 95 2020     (H) 2020     Lab Results   Component Value Date    K 4 3 2020     2020    CO2 28 2020    BUN 22 2020 CREATININE 1 05 05/28/2020    GLUF 80 05/28/2020    CALCIUM 9 3 05/28/2020    AST 32 05/28/2020    ALT 40 05/28/2020    ALKPHOS 51 05/28/2020    EGFR 54 05/28/2020       Lab Results   Component Value Date    IRON 81 05/28/2020    TIBC 302 05/28/2020    FERRITIN 39 05/28/2020       Review of Systems   Gastrointestinal: Positive for nausea (mild, intermittent )  All other systems reviewed and are negative  Active Problems:   Patient Active Problem List   Diagnosis    TB lung, latent    [facility] arthritis ([facility] [address] )    [facility]       Past Medical History:   Past Medical History:   Diagnosis Date    Hyperlipidemia     Hypertension     Psoriasis        Surgical History:   Past Surgical History:   Procedure Laterality Date    APPENDECTOMY         Family History:    Family History   Problem Relation Age of Onset    Tuberculosis Father     Tuberculosis Brother        Cancer-related family history is not on file      Social History:   Social History     Socioeconomic History    Marital status:      Spouse name: Not on file    Number of children: Not on file    Years of education: Not on file    Highest education level: Not on file   Occupational History    Not on file   Social Needs    Financial resource strain: Not on file    Food insecurity:     Worry: Not on file     Inability: Not on file    Transportation needs:     Medical: Not on file     Non-medical: Not on file   Tobacco Use    Smoking status: Former Smoker    Smokeless tobacco: Never Used    Tobacco comment: Quit 2010   Substance and Sexual Activity    Alcohol use: Not on file    Drug use: Not on file    Sexual activity: Not on file   Lifestyle    Physical activity:     Days per week: Not on file     Minutes per session: Not on file    Stress: Not on file   Relationships    Social connections:     Talks on phone: Not on file     Gets together: Not on file     Attends [employer] service: Not on file Active member of club or organization: Not on file     Attends meetings of clubs or organizations: Not on file     Relationship status: Not on file    Intimate partner violence:     Fear of current or ex partner: Not on file     Emotionally abused: Not on file     Physically abused: Not on file     Forced sexual activity: Not on file   Other Topics Concern    Not on file   Social History Narrative    Not on file       Current Medications:   Current Outpatient Medications   Medication Sig Dispense Refill    amLODIPine (NORVASC) 5 mg tablet 5 mg 2 (two) times a day       Apremilast (OTEZLA) 30 MG TABS 30 mg Every 12 hours       aspirin 81 MG tablet Take 81 mg by mouth daily       calcipotriene (DOVONEX) 0 005 % cream calcipotriene 0 005 % topical cream   APPLY TWICE A DAY TO PSORIASIS MONDAY-FRIDAY      chlorhexidine (PERIDEX) 0 12 % solution       Cholecalciferol (VITAMIN D3) 5000 units TABS 5,000 Units Daily       diphenhydrAMINE (BENADRYL) 25 mg capsule Take 25 mg by mouth every 12 (twelve) hours as needed for itching       Fluocinolone Acetonide Body (DERMA-SMOOTHE/FS BODY) 0 01 % OIL Apply topically      Fluocinolone Acetonide Scalp 0 01 % OIL       halobetasol (ULTRAVATE) 0 05 % ointment       Halobetasol Propionate 0 01 % LOTN halobetasol propionate 0 05 % topical ointment      losartan (COZAAR) 50 mg tablet Take 50 mg by mouth daily       Probiotic Product (PROBIOTIC DAILY PO) Take 1 capsule by mouth daily      pyridoxine (VITAMIN B6) 50 mg tablet Take 1 tablet (50 mg total) by mouth daily 30 tablet 8    rosuvastatin (CRESTOR) 5 mg tablet Take 5 mg by mouth daily       hydroxyurea (HYDREA) 500 mg capsule Take 1 capsule (500 mg total) by mouth daily 30 capsule 1     No current facility-administered medications for this visit          Allergies: No Known Allergies    Physical Exam:  /74 (BP Location: Left arm)   Pulse 68   Temp (!) 97 1 °F (36 2 °C) (Tympanic)   Resp 16   Ht 5' 2" (1 575 m)   Wt 54 9 kg (121 lb)   BMI 22 13 kg/m²   Body surface area is 1 54 meters squared  Wt Readings from Last 3 Encounters:   07/02/20 54 9 kg (121 lb)   05/28/20 55 3 kg (122 lb)   02/18/20 54 4 kg (120 lb)           Physical Exam   Constitutional: She is oriented to person, place, and time  She appears well-developed and well-nourished  No distress  HENT:   Head: Normocephalic and atraumatic  Eyes: Pupils are equal, round, and reactive to light  EOM are normal  No scleral icterus  Neck: Normal range of motion  Neck supple  Cardiovascular: Normal rate and regular rhythm  Pulmonary/Chest: Effort normal and breath sounds normal    Abdominal: Soft  Bowel sounds are normal    Musculoskeletal: Normal range of motion  She exhibits no edema  Lymphadenopathy:     She has no cervical adenopathy  Neurological: She is alert and oriented to person, place, and time  Skin: Skin is warm and dry  She is not diaphoretic  Psychiatric: She has a normal mood and affect  Her behavior is normal  Judgment and thought content normal        Assessment / Plan:    1  Essential thrombocytosis ([address] )      The patient is a very pleasant 51-year-old female  Myeloproliferative workup confirmed JAK2 positive essential thrombocytosis  She has been taking a baby aspirin daily  However since her platelets have been consistently elevated and have approached 800 in past lab studies we discussed initiation of Hydrea  The purpose of Hydrea was explained potential side effects were reviewed  Patient was provided with written information on Hydrea  Patient agreed to treatment plan and consented to [facility] both written and verbally, Dr Julia Muñoz was involved with plan of care  I will start her at 500 mg a day  She was instructed to take daily  I will recheck her CBC with differential in 2 weeks and then again in 4 weeks  She will return to the office in 4 weeks for a follow-up visit    All of patient's questions were answered to her satisfaction  Goals and Barriers:  Current Goal:  Prolong Survival from thrombocytosis  Barriers: None  Patient's Capacity to Self Care:  Patient is able to self care  Portions of the record may have been created with voice recognition software  Occasional wrong word or "sound a like" substitutions may have occurred due to the inherent limitations of voice recognition software  Read the chart carefully and recognize, using context, where substitutions have occurred

## 2020-07-02 ENCOUNTER — OFFICE VISIT (OUTPATIENT)
Dept: HEMATOLOGY ONCOLOGY | Facility: CLINIC | Age: 70
End: 2020-07-02
Payer: MEDICARE

## 2020-07-02 VITALS
SYSTOLIC BLOOD PRESSURE: 122 MMHG | BODY MASS INDEX: 22.26 KG/M2 | DIASTOLIC BLOOD PRESSURE: 74 MMHG | WEIGHT: 121 LBS | HEIGHT: 62 IN | RESPIRATION RATE: 16 BRPM | HEART RATE: 68 BPM | TEMPERATURE: 97.1 F

## 2020-07-02 DIAGNOSIS — D47.3 ESSENTIAL THROMBOCYTOSIS (HCC): Primary | ICD-10-CM

## 2020-07-02 PROCEDURE — 99214 OFFICE O/P EST MOD 30 MIN: CPT | Performed by: NURSE PRACTITIONER

## 2020-07-02 RX ORDER — HYDROXYUREA 500 MG/1
500 CAPSULE ORAL DAILY
Qty: 30 CAPSULE | Refills: 1 | Status: SHIPPED | OUTPATIENT
Start: 2020-07-02 | End: 2020-07-30 | Stop reason: SDUPTHER

## 2020-07-06 ENCOUNTER — TRANSCRIBE ORDERS (OUTPATIENT)
Dept: LAB | Facility: CLINIC | Age: 70
End: 2020-07-06

## 2020-07-06 ENCOUNTER — APPOINTMENT (OUTPATIENT)
Dept: LAB | Facility: CLINIC | Age: 70
End: 2020-07-06
Payer: MEDICARE

## 2020-07-06 DIAGNOSIS — D47.3 ESSENTIAL THROMBOCYTOSIS (HCC): ICD-10-CM

## 2020-07-06 DIAGNOSIS — E55.9 AVITAMINOSIS D: ICD-10-CM

## 2020-07-06 DIAGNOSIS — Z00.00 ROUTINE GENERAL MEDICAL EXAMINATION AT A HEALTH CARE FACILITY: ICD-10-CM

## 2020-07-06 DIAGNOSIS — E78.5 HYPERLIPIDEMIA, UNSPECIFIED HYPERLIPIDEMIA TYPE: Primary | ICD-10-CM

## 2020-07-06 DIAGNOSIS — E78.5 HYPERLIPIDEMIA, UNSPECIFIED HYPERLIPIDEMIA TYPE: ICD-10-CM

## 2020-07-06 LAB
25(OH)D3 SERPL-MCNC: 55.9 NG/ML (ref 30–100)
ALBUMIN SERPL BCP-MCNC: 4 G/DL (ref 3.5–5)
ALP SERPL-CCNC: 54 U/L (ref 46–116)
ALT SERPL W P-5'-P-CCNC: 19 U/L (ref 12–78)
ANION GAP SERPL CALCULATED.3IONS-SCNC: 7 MMOL/L (ref 4–13)
AST SERPL W P-5'-P-CCNC: 20 U/L (ref 5–45)
BASOPHILS # BLD AUTO: 0.11 THOUSANDS/ΜL (ref 0–0.1)
BASOPHILS NFR BLD AUTO: 2 % (ref 0–1)
BILIRUB SERPL-MCNC: 0.5 MG/DL (ref 0.2–1)
BUN SERPL-MCNC: 19 MG/DL (ref 5–25)
CALCIUM SERPL-MCNC: 9.3 MG/DL (ref 8.3–10.1)
CHLORIDE SERPL-SCNC: 104 MMOL/L (ref 100–108)
CHOLEST SERPL-MCNC: 165 MG/DL (ref 50–200)
CO2 SERPL-SCNC: 29 MMOL/L (ref 21–32)
CREAT SERPL-MCNC: 1.04 MG/DL (ref 0.6–1.3)
EOSINOPHIL # BLD AUTO: 0.33 THOUSAND/ΜL (ref 0–0.61)
EOSINOPHIL NFR BLD AUTO: 5 % (ref 0–6)
ERYTHROCYTE [DISTWIDTH] IN BLOOD BY AUTOMATED COUNT: 14.3 % (ref 11.6–15.1)
GFR SERPL CREATININE-BSD FRML MDRD: 55 ML/MIN/1.73SQ M
GLUCOSE P FAST SERPL-MCNC: 90 MG/DL (ref 65–99)
HCT VFR BLD AUTO: 43.3 % (ref 34.8–46.1)
HDLC SERPL-MCNC: 83 MG/DL
HGB BLD-MCNC: 13.9 G/DL (ref 11.5–15.4)
IMM GRANULOCYTES # BLD AUTO: 0.02 THOUSAND/UL (ref 0–0.2)
IMM GRANULOCYTES NFR BLD AUTO: 0 % (ref 0–2)
LDLC SERPL CALC-MCNC: 59 MG/DL (ref 0–100)
LDLC SERPL DIRECT ASSAY-MCNC: 68 MG/DL (ref 0–100)
LYMPHOCYTES # BLD AUTO: 1.14 THOUSANDS/ΜL (ref 0.6–4.47)
LYMPHOCYTES NFR BLD AUTO: 18 % (ref 14–44)
MCH RBC QN AUTO: 30.7 PG (ref 26.8–34.3)
MCHC RBC AUTO-ENTMCNC: 32.1 G/DL (ref 31.4–37.4)
MCV RBC AUTO: 96 FL (ref 82–98)
MONOCYTES # BLD AUTO: 0.54 THOUSAND/ΜL (ref 0.17–1.22)
MONOCYTES NFR BLD AUTO: 9 % (ref 4–12)
NEUTROPHILS # BLD AUTO: 4.11 THOUSANDS/ΜL (ref 1.85–7.62)
NEUTS SEG NFR BLD AUTO: 66 % (ref 43–75)
NONHDLC SERPL-MCNC: 82 MG/DL
NRBC BLD AUTO-RTO: 0 /100 WBCS
PLATELET # BLD AUTO: 763 THOUSANDS/UL (ref 149–390)
PMV BLD AUTO: 9.1 FL (ref 8.9–12.7)
POTASSIUM SERPL-SCNC: 3.8 MMOL/L (ref 3.5–5.3)
PROT SERPL-MCNC: 6.9 G/DL (ref 6.4–8.2)
RBC # BLD AUTO: 4.53 MILLION/UL (ref 3.81–5.12)
SODIUM SERPL-SCNC: 140 MMOL/L (ref 136–145)
TRIGL SERPL-MCNC: 115 MG/DL
WBC # BLD AUTO: 6.25 THOUSAND/UL (ref 4.31–10.16)

## 2020-07-06 PROCEDURE — 80061 LIPID PANEL: CPT

## 2020-07-06 PROCEDURE — 85025 COMPLETE CBC W/AUTO DIFF WBC: CPT | Performed by: INTERNAL MEDICINE

## 2020-07-06 PROCEDURE — 83721 ASSAY OF BLOOD LIPOPROTEIN: CPT

## 2020-07-06 PROCEDURE — 80053 COMPREHEN METABOLIC PANEL: CPT

## 2020-07-06 PROCEDURE — 82306 VITAMIN D 25 HYDROXY: CPT

## 2020-07-06 PROCEDURE — 36415 COLL VENOUS BLD VENIPUNCTURE: CPT | Performed by: INTERNAL MEDICINE

## 2020-07-14 ENCOUNTER — TELEPHONE (OUTPATIENT)
Dept: HEMATOLOGY ONCOLOGY | Facility: MEDICAL CENTER | Age: 70
End: 2020-07-14

## 2020-07-14 ENCOUNTER — TELEPHONE (OUTPATIENT)
Dept: HEMATOLOGY ONCOLOGY | Facility: CLINIC | Age: 70
End: 2020-07-14

## 2020-07-25 ENCOUNTER — APPOINTMENT (OUTPATIENT)
Dept: LAB | Facility: CLINIC | Age: 70
End: 2020-07-25
Payer: MEDICARE

## 2020-07-27 NOTE — PROGRESS NOTES
Hematology/Oncology Outpatient Follow- up Note  Denisse Lange, 1950, [id number]  2020        Chief Complaint   Patient presents with    Follow-up   Thrombocytosis; Jak2 positive     HPI:  Denisse Lange is a 72 yo female who was seen for initial consultation on 2020 regarding thrombocytosis  Her thrombocytosis was significant with platelet count in the range of 700 on several occasions  She has an elevated platelet count dating back to 2020  The remainder of her cell lines are WNL  Given that she has had consistently elevated platelets over the past 7 months, a myeloproliferative workup was ordered  Results of this indicate a positive JAK2 mutation confirming a diagnosis of essential thrombocytosis  Previous Hematologic/ Oncologic History:    Workup    Current Hematologic/ Oncologic Treatment:    Hydrea 500 mg daily started on 20    ECO - Asymptomatic    Interval History:  The patient returns for a follow-up visit  She was last seen on  and she was started on Hydrea 500 mg daily, she delayed treatment start until 20  CBC done prior to starting Hydrea on  revealed platelet count of 667  Repeat CBC on  shows nice response with a decrease in her  platelets  to 733  This is her lowest documented platelet count since 2020  Patient has been taking Hydrea for almost 3 weeks now, she is tolerating this without any side effects  No complaints of nausea, diarrhea or constipation  Denies headaches  Overall she feels well  Cancer Staging:  Cancer Staging  No matching staging information was found for the patient  Molecular Testing:       Test Results:    Imaging: No results found      Labs:   Lab Results   Component Value Date    WBC 5 91 2020    HGB 13 4 2020    HCT 40 8 2020    MCV 95 2020     (H) 2020     Lab Results   Component Value Date    K 3 8 2020     2020    CO2 29 07/06/2020    BUN 19 07/06/2020    CREATININE 1 04 07/06/2020    GLUF 90 07/06/2020    CALCIUM 9 3 07/06/2020    AST 20 07/06/2020    ALT 19 07/06/2020    ALKPHOS 54 07/06/2020    EGFR 55 07/06/2020       Lab Results   Component Value Date    IRON 81 05/28/2020    TIBC 302 05/28/2020    FERRITIN 39 05/28/2020         Review of Systems   All other systems reviewed and are negative  Active Problems:   Patient Active Problem List   Diagnosis    TB lung, latent    [facility] [address] [facility]    [facility]       Past Medical History:   Past Medical History:   Diagnosis Date    Hyperlipidemia     Hypertension     Psoriasis        Surgical History:   Past Surgical History:   Procedure Laterality Date    APPENDECTOMY         Family History:    Family History   Problem Relation Age of Onset    Tuberculosis Father     Tuberculosis Brother        Cancer-related family history is not on file      Social History:   Social History     Socioeconomic History    Marital status:      Spouse name: Not on file    Number of children: Not on file    Years of education: Not on file    Highest education level: Not on file   Occupational History    Not on file   Social Needs    Financial resource strain: Not on file    Food insecurity:     Worry: Not on file     Inability: Not on file    Transportation needs:     Medical: Not on file     Non-medical: Not on file   Tobacco Use    Smoking status: Former Smoker    Smokeless tobacco: Never Used    Tobacco comment: Quit 2010   Substance and Sexual Activity    Alcohol use: Not on file    Drug use: Not on file    Sexual activity: Not on file   Lifestyle    Physical activity:     Days per week: Not on file     Minutes per session: Not on file    Stress: Not on file   Relationships    Social connections:     Talks on phone: Not on file     Gets together: Not on file     Attends [employer] service: Not on file     Active member of club or organization: Not on file     Attends meetings of clubs or organizations: Not on file     Relationship status: Not on file    Intimate partner violence:     Fear of current or ex partner: Not on file     Emotionally abused: Not on file     Physically abused: Not on file     Forced sexual activity: Not on file   Other Topics Concern    Not on file   Social History Narrative    Not on file       Current Medications:   Current Outpatient Medications   Medication Sig Dispense Refill    amLODIPine (NORVASC) 5 mg tablet 5 mg 2 (two) times a day       Apremilast (OTEZLA) 30 MG TABS 30 mg Every 12 hours       aspirin 81 MG tablet Take 81 mg by mouth daily       calcipotriene (DOVONEX) 0 005 % cream calcipotriene 0 005 % topical cream   APPLY TWICE A DAY TO PSORIASIS MONDAY-FRIDAY      chlorhexidine (PERIDEX) 0 12 % solution       Cholecalciferol (VITAMIN D3) 5000 units TABS 5,000 Units Daily       diphenhydrAMINE (BENADRYL) 25 mg capsule Take 25 mg by mouth every 12 (twelve) hours as needed for itching       Fluocinolone Acetonide Body (DERMA-SMOOTHE/FS BODY) 0 01 % OIL Apply topically      Fluocinolone Acetonide Scalp 0 01 % OIL       halobetasol (ULTRAVATE) 0 05 % ointment       Halobetasol Propionate 0 01 % LOTN halobetasol propionate 0 05 % topical ointment      hydroxyurea (HYDREA) 500 mg capsule Take 1 capsule (500 mg total) by mouth daily 30 capsule 6    losartan (COZAAR) 50 mg tablet Take 50 mg by mouth daily       Probiotic Product (PROBIOTIC DAILY PO) Take 1 capsule by mouth daily      pyridoxine (VITAMIN B6) 50 mg tablet Take 1 tablet (50 mg total) by mouth daily 30 tablet 8    rosuvastatin (CRESTOR) 5 mg tablet Take 5 mg by mouth daily        No current facility-administered medications for this visit          Allergies: No Known Allergies    Physical Exam:  BP 94/62 (BP Location: Left arm)   Pulse 81   Temp (!) 97 3 °F (36 3 °C) (Tympanic)   Resp 16   Ht 5' 2" (1 575 m)   Wt 55 3 kg (122 lb)   SpO2 97%   BMI 22 31 kg/m²   Body surface area is 1 55 meters squared  Wt Readings from Last 3 Encounters:   07/30/20 55 3 kg (122 lb)   07/02/20 54 9 kg (121 lb)   05/28/20 55 3 kg (122 lb)           Physical Exam   Constitutional: She is oriented to person, place, and time  She appears well-developed and well-nourished  Patient is alert, pleasant and cooperative  She appears well in no acute distress   HENT:   Head: Normocephalic and atraumatic  Right Ear: External ear normal    Left Ear: External ear normal    Mouth/Throat: Oropharynx is clear and moist  No oropharyngeal exudate  Eyes: EOM are normal  No scleral icterus  Neck: Normal range of motion  Neck supple  Cardiovascular: Normal rate and regular rhythm  Pulmonary/Chest: Effort normal and breath sounds normal    Abdominal: Soft  Bowel sounds are normal    Musculoskeletal: Normal range of motion  She exhibits no edema  Lymphadenopathy:     She has no cervical adenopathy  Neurological: She is alert and oriented to person, place, and time  Skin: Skin is warm and dry  Psychiatric: She has a normal mood and affect  Her behavior is normal  Judgment and thought content normal        Assessment / Plan:    1  Essential thrombocytosis ([facility] [address] )    The patient is a very pleasant 51-year-old female  Myeloproliferative workup confirmed JAK2 positive essential thrombocytosis  She has been taking a baby aspirin daily  However since her platelets have been consistently elevated and have approached 800 in past lab studies we discussed initiation of Hydrea  She started Hydrea 500 mg daily on 07/12/2020 and so far has had a nice response with a decrease in her platelet count to its low with number since January 2020  She is tolerating this dose without any side effects  For now, since she has only been on this for approximately 3 weeks I will continue her on the same dose and closely monitor her blood counts    I will repeat her CBC and CMP in 1 month  I will notify her of these results over the phone and provide instruction for ongoing management based on her blood counts  She will return for follow-up visit in 2 months with another CBC and CMP  Patient verbalized understanding and was in agreement with the plan of care  I have sent a prescription for the Hydrea to her pharmacy  She was instructed to call with any questions or concerns prior to her next office visit  Goals and Barriers:  Current Goal:  Prolong Survival from thrombocytosis  Barriers: None  Patient's Capacity to Self Care:  Patient is able to self care  Portions of the record may have been created with voice recognition software  Occasional wrong word or "sound a like" substitutions may have occurred due to the inherent limitations of voice recognition software  Read the chart carefully and recognize, using context, where substitutions have occurred

## 2020-07-29 ENCOUNTER — TELEPHONE (OUTPATIENT)
Dept: SURGICAL ONCOLOGY | Facility: CLINIC | Age: 70
End: 2020-07-29

## 2020-07-29 ENCOUNTER — TELEPHONE (OUTPATIENT)
Dept: HEMATOLOGY ONCOLOGY | Facility: CLINIC | Age: 70
End: 2020-07-29

## 2020-07-30 ENCOUNTER — OFFICE VISIT (OUTPATIENT)
Dept: HEMATOLOGY ONCOLOGY | Facility: CLINIC | Age: 70
End: 2020-07-30
Payer: MEDICARE

## 2020-07-30 VITALS
HEART RATE: 81 BPM | DIASTOLIC BLOOD PRESSURE: 62 MMHG | HEIGHT: 62 IN | RESPIRATION RATE: 16 BRPM | OXYGEN SATURATION: 97 % | SYSTOLIC BLOOD PRESSURE: 94 MMHG | WEIGHT: 122 LBS | TEMPERATURE: 97.3 F | BODY MASS INDEX: 22.45 KG/M2

## 2020-07-30 DIAGNOSIS — D47.3 ESSENTIAL THROMBOCYTOSIS (HCC): Primary | ICD-10-CM

## 2020-07-30 PROCEDURE — 99214 OFFICE O/P EST MOD 30 MIN: CPT | Performed by: NURSE PRACTITIONER

## 2020-07-30 RX ORDER — HYDROXYUREA 500 MG/1
500 CAPSULE ORAL DAILY
Qty: 30 CAPSULE | Refills: 6 | Status: SHIPPED | OUTPATIENT
Start: 2020-07-30 | End: 2021-03-03 | Stop reason: SDUPTHER

## 2020-08-19 ENCOUNTER — TRANSCRIBE ORDERS (OUTPATIENT)
Dept: LAB | Facility: CLINIC | Age: 70
End: 2020-08-19

## 2020-08-19 ENCOUNTER — APPOINTMENT (OUTPATIENT)
Dept: LAB | Facility: CLINIC | Age: 70
End: 2020-08-19
Payer: MEDICARE

## 2020-08-19 LAB
BASOPHILS # BLD AUTO: 0.1 THOUSANDS/ΜL (ref 0–0.1)
BASOPHILS NFR BLD AUTO: 2 % (ref 0–1)
EOSINOPHIL # BLD AUTO: 0.16 THOUSAND/ΜL (ref 0–0.61)
EOSINOPHIL NFR BLD AUTO: 3 % (ref 0–6)
ERYTHROCYTE [DISTWIDTH] IN BLOOD BY AUTOMATED COUNT: 17.2 % (ref 11.6–15.1)
HCT VFR BLD AUTO: 40.4 % (ref 34.8–46.1)
HGB BLD-MCNC: 12.9 G/DL (ref 11.5–15.4)
IMM GRANULOCYTES # BLD AUTO: 0.01 THOUSAND/UL (ref 0–0.2)
IMM GRANULOCYTES NFR BLD AUTO: 0 % (ref 0–2)
LYMPHOCYTES # BLD AUTO: 0.89 THOUSANDS/ΜL (ref 0.6–4.47)
LYMPHOCYTES NFR BLD AUTO: 17 % (ref 14–44)
MCH RBC QN AUTO: 31.5 PG (ref 26.8–34.3)
MCHC RBC AUTO-ENTMCNC: 31.9 G/DL (ref 31.4–37.4)
MCV RBC AUTO: 99 FL (ref 82–98)
MONOCYTES # BLD AUTO: 0.44 THOUSAND/ΜL (ref 0.17–1.22)
MONOCYTES NFR BLD AUTO: 8 % (ref 4–12)
NEUTROPHILS # BLD AUTO: 3.69 THOUSANDS/ΜL (ref 1.85–7.62)
NEUTS SEG NFR BLD AUTO: 70 % (ref 43–75)
NRBC BLD AUTO-RTO: 0 /100 WBCS
PLATELET # BLD AUTO: 431 THOUSANDS/UL (ref 149–390)
PMV BLD AUTO: 9 FL (ref 8.9–12.7)
RBC # BLD AUTO: 4.09 MILLION/UL (ref 3.81–5.12)
WBC # BLD AUTO: 5.29 THOUSAND/UL (ref 4.31–10.16)

## 2020-08-19 PROCEDURE — 85025 COMPLETE CBC W/AUTO DIFF WBC: CPT | Performed by: NURSE PRACTITIONER

## 2020-08-19 PROCEDURE — 36415 COLL VENOUS BLD VENIPUNCTURE: CPT | Performed by: NURSE PRACTITIONER

## 2020-09-25 ENCOUNTER — TRANSCRIBE ORDERS (OUTPATIENT)
Dept: LAB | Facility: CLINIC | Age: 70
End: 2020-09-25

## 2020-09-25 ENCOUNTER — APPOINTMENT (OUTPATIENT)
Dept: LAB | Facility: CLINIC | Age: 70
End: 2020-09-25
Payer: MEDICARE

## 2020-09-25 DIAGNOSIS — D47.3 ESSENTIAL THROMBOCYTOSIS (HCC): ICD-10-CM

## 2020-09-25 LAB
ALBUMIN SERPL BCP-MCNC: 4 G/DL (ref 3.5–5)
ALP SERPL-CCNC: 49 U/L (ref 46–116)
ALT SERPL W P-5'-P-CCNC: 20 U/L (ref 12–78)
ANION GAP SERPL CALCULATED.3IONS-SCNC: 8 MMOL/L (ref 4–13)
AST SERPL W P-5'-P-CCNC: 20 U/L (ref 5–45)
BILIRUB SERPL-MCNC: 0.54 MG/DL (ref 0.2–1)
BUN SERPL-MCNC: 17 MG/DL (ref 5–25)
CALCIUM SERPL-MCNC: 9.4 MG/DL (ref 8.3–10.1)
CHLORIDE SERPL-SCNC: 106 MMOL/L (ref 100–108)
CO2 SERPL-SCNC: 28 MMOL/L (ref 21–32)
CREAT SERPL-MCNC: 1.01 MG/DL (ref 0.6–1.3)
GFR SERPL CREATININE-BSD FRML MDRD: 57 ML/MIN/1.73SQ M
GLUCOSE SERPL-MCNC: 107 MG/DL (ref 65–140)
POTASSIUM SERPL-SCNC: 4.6 MMOL/L (ref 3.5–5.3)
PROT SERPL-MCNC: 6.7 G/DL (ref 6.4–8.2)
SODIUM SERPL-SCNC: 142 MMOL/L (ref 136–145)

## 2020-09-25 PROCEDURE — 80053 COMPREHEN METABOLIC PANEL: CPT

## 2020-10-06 ENCOUNTER — OFFICE VISIT (OUTPATIENT)
Dept: HEMATOLOGY ONCOLOGY | Facility: CLINIC | Age: 70
End: 2020-10-06
Payer: MEDICARE

## 2020-10-06 VITALS
WEIGHT: 120 LBS | HEIGHT: 62 IN | OXYGEN SATURATION: 99 % | DIASTOLIC BLOOD PRESSURE: 80 MMHG | HEART RATE: 64 BPM | BODY MASS INDEX: 22.08 KG/M2 | RESPIRATION RATE: 16 BRPM | SYSTOLIC BLOOD PRESSURE: 110 MMHG | TEMPERATURE: 98.4 F

## 2020-10-06 DIAGNOSIS — D47.3 ESSENTIAL THROMBOCYTOSIS (HCC): Primary | ICD-10-CM

## 2020-10-06 PROCEDURE — 99214 OFFICE O/P EST MOD 30 MIN: CPT | Performed by: NURSE PRACTITIONER

## 2020-10-23 ENCOUNTER — LAB (OUTPATIENT)
Dept: LAB | Facility: CLINIC | Age: 70
End: 2020-10-23
Payer: MEDICARE

## 2020-10-23 LAB
ALBUMIN SERPL BCP-MCNC: 3.6 G/DL (ref 3.5–5)
ALP SERPL-CCNC: 45 U/L (ref 46–116)
ALT SERPL W P-5'-P-CCNC: 18 U/L (ref 12–78)
ANION GAP SERPL CALCULATED.3IONS-SCNC: 7 MMOL/L (ref 4–13)
AST SERPL W P-5'-P-CCNC: 16 U/L (ref 5–45)
BASOPHILS # BLD AUTO: 0.05 THOUSANDS/ΜL (ref 0–0.1)
BASOPHILS NFR BLD AUTO: 1 % (ref 0–1)
BILIRUB SERPL-MCNC: 0.45 MG/DL (ref 0.2–1)
BUN SERPL-MCNC: 13 MG/DL (ref 5–25)
CALCIUM SERPL-MCNC: 9 MG/DL (ref 8.3–10.1)
CHLORIDE SERPL-SCNC: 107 MMOL/L (ref 100–108)
CO2 SERPL-SCNC: 29 MMOL/L (ref 21–32)
CREAT SERPL-MCNC: 1 MG/DL (ref 0.6–1.3)
EOSINOPHIL # BLD AUTO: 0.09 THOUSAND/ΜL (ref 0–0.61)
EOSINOPHIL NFR BLD AUTO: 2 % (ref 0–6)
ERYTHROCYTE [DISTWIDTH] IN BLOOD BY AUTOMATED COUNT: 18.3 % (ref 11.6–15.1)
GFR SERPL CREATININE-BSD FRML MDRD: 58 ML/MIN/1.73SQ M
GLUCOSE SERPL-MCNC: 72 MG/DL (ref 65–140)
HCT VFR BLD AUTO: 36.9 % (ref 34.8–46.1)
HGB BLD-MCNC: 11.9 G/DL (ref 11.5–15.4)
IMM GRANULOCYTES # BLD AUTO: 0.01 THOUSAND/UL (ref 0–0.2)
IMM GRANULOCYTES NFR BLD AUTO: 0 % (ref 0–2)
LYMPHOCYTES # BLD AUTO: 0.98 THOUSANDS/ΜL (ref 0.6–4.47)
LYMPHOCYTES NFR BLD AUTO: 23 % (ref 14–44)
MCH RBC QN AUTO: 34.1 PG (ref 26.8–34.3)
MCHC RBC AUTO-ENTMCNC: 32.2 G/DL (ref 31.4–37.4)
MCV RBC AUTO: 106 FL (ref 82–98)
MONOCYTES # BLD AUTO: 0.43 THOUSAND/ΜL (ref 0.17–1.22)
MONOCYTES NFR BLD AUTO: 10 % (ref 4–12)
NEUTROPHILS # BLD AUTO: 2.68 THOUSANDS/ΜL (ref 1.85–7.62)
NEUTS SEG NFR BLD AUTO: 64 % (ref 43–75)
NRBC BLD AUTO-RTO: 0 /100 WBCS
PLATELET # BLD AUTO: 346 THOUSANDS/UL (ref 149–390)
PMV BLD AUTO: 9 FL (ref 8.9–12.7)
POTASSIUM SERPL-SCNC: 4 MMOL/L (ref 3.5–5.3)
PROT SERPL-MCNC: 6.4 G/DL (ref 6.4–8.2)
RBC # BLD AUTO: 3.49 MILLION/UL (ref 3.81–5.12)
SODIUM SERPL-SCNC: 143 MMOL/L (ref 136–145)
WBC # BLD AUTO: 4.24 THOUSAND/UL (ref 4.31–10.16)

## 2020-10-23 PROCEDURE — 85025 COMPLETE CBC W/AUTO DIFF WBC: CPT | Performed by: NURSE PRACTITIONER

## 2020-10-23 PROCEDURE — 80053 COMPREHEN METABOLIC PANEL: CPT | Performed by: NURSE PRACTITIONER

## 2020-10-23 PROCEDURE — 36415 COLL VENOUS BLD VENIPUNCTURE: CPT | Performed by: NURSE PRACTITIONER

## 2020-11-28 ENCOUNTER — LAB (OUTPATIENT)
Dept: LAB | Facility: CLINIC | Age: 70
End: 2020-11-28
Payer: MEDICARE

## 2020-12-03 NOTE — PATIENT INSTRUCTIONS
Please continue to take your medication as prescribed  Please have your blood work repeated and follow up with us in 1 month  Appropriate

## 2020-12-09 ENCOUNTER — TELEPHONE (OUTPATIENT)
Dept: HEMATOLOGY ONCOLOGY | Facility: CLINIC | Age: 70
End: 2020-12-09

## 2020-12-10 ENCOUNTER — OFFICE VISIT (OUTPATIENT)
Dept: HEMATOLOGY ONCOLOGY | Facility: CLINIC | Age: 70
End: 2020-12-10
Payer: MEDICARE

## 2020-12-10 VITALS
HEART RATE: 75 BPM | RESPIRATION RATE: 16 BRPM | SYSTOLIC BLOOD PRESSURE: 118 MMHG | HEIGHT: 62 IN | DIASTOLIC BLOOD PRESSURE: 72 MMHG | TEMPERATURE: 97.5 F | BODY MASS INDEX: 22.26 KG/M2 | OXYGEN SATURATION: 98 % | WEIGHT: 121 LBS

## 2020-12-10 DIAGNOSIS — D75.839 THROMBOCYTOSIS: Primary | ICD-10-CM

## 2020-12-10 PROCEDURE — 99214 OFFICE O/P EST MOD 30 MIN: CPT | Performed by: INTERNAL MEDICINE

## 2021-01-18 ENCOUNTER — LAB (OUTPATIENT)
Dept: LAB | Age: 71
End: 2021-01-18
Payer: MEDICARE

## 2021-01-18 ENCOUNTER — TRANSCRIBE ORDERS (OUTPATIENT)
Dept: ADMINISTRATIVE | Age: 71
End: 2021-01-18

## 2021-01-18 DIAGNOSIS — D75.839 THROMBOCYTOSIS: ICD-10-CM

## 2021-01-18 DIAGNOSIS — D47.3 ESSENTIAL THROMBOCYTOSIS (HCC): ICD-10-CM

## 2021-01-18 LAB
ALBUMIN SERPL BCP-MCNC: 3.9 G/DL (ref 3.5–5)
ALP SERPL-CCNC: 51 U/L (ref 46–116)
ALT SERPL W P-5'-P-CCNC: 19 U/L (ref 12–78)
ANION GAP SERPL CALCULATED.3IONS-SCNC: 3 MMOL/L (ref 4–13)
AST SERPL W P-5'-P-CCNC: 20 U/L (ref 5–45)
BASOPHILS # BLD AUTO: 0.05 THOUSANDS/ΜL (ref 0–0.1)
BASOPHILS NFR BLD AUTO: 1 % (ref 0–1)
BILIRUB SERPL-MCNC: 0.26 MG/DL (ref 0.2–1)
BUN SERPL-MCNC: 19 MG/DL (ref 5–25)
CALCIUM SERPL-MCNC: 9.5 MG/DL (ref 8.3–10.1)
CHLORIDE SERPL-SCNC: 111 MMOL/L (ref 100–108)
CO2 SERPL-SCNC: 29 MMOL/L (ref 21–32)
CREAT SERPL-MCNC: 0.95 MG/DL (ref 0.6–1.3)
EOSINOPHIL # BLD AUTO: 0.09 THOUSAND/ΜL (ref 0–0.61)
EOSINOPHIL NFR BLD AUTO: 2 % (ref 0–6)
ERYTHROCYTE [DISTWIDTH] IN BLOOD BY AUTOMATED COUNT: 13.3 % (ref 11.6–15.1)
GFR SERPL CREATININE-BSD FRML MDRD: 61 ML/MIN/1.73SQ M
GLUCOSE P FAST SERPL-MCNC: 67 MG/DL (ref 65–99)
HCT VFR BLD AUTO: 37.2 % (ref 34.8–46.1)
HGB BLD-MCNC: 12.2 G/DL (ref 11.5–15.4)
IMM GRANULOCYTES # BLD AUTO: 0.02 THOUSAND/UL (ref 0–0.2)
IMM GRANULOCYTES NFR BLD AUTO: 1 % (ref 0–2)
LYMPHOCYTES # BLD AUTO: 0.85 THOUSANDS/ΜL (ref 0.6–4.47)
LYMPHOCYTES NFR BLD AUTO: 21 % (ref 14–44)
MCH RBC QN AUTO: 37 PG (ref 26.8–34.3)
MCHC RBC AUTO-ENTMCNC: 32.8 G/DL (ref 31.4–37.4)
MCV RBC AUTO: 113 FL (ref 82–98)
MONOCYTES # BLD AUTO: 0.36 THOUSAND/ΜL (ref 0.17–1.22)
MONOCYTES NFR BLD AUTO: 9 % (ref 4–12)
NEUTROPHILS # BLD AUTO: 2.76 THOUSANDS/ΜL (ref 1.85–7.62)
NEUTS SEG NFR BLD AUTO: 66 % (ref 43–75)
NRBC BLD AUTO-RTO: 0 /100 WBCS
PLATELET # BLD AUTO: 302 THOUSANDS/UL (ref 149–390)
PMV BLD AUTO: 8.9 FL (ref 8.9–12.7)
POTASSIUM SERPL-SCNC: 4.3 MMOL/L (ref 3.5–5.3)
PROT SERPL-MCNC: 6.7 G/DL (ref 6.4–8.2)
RBC # BLD AUTO: 3.3 MILLION/UL (ref 3.81–5.12)
SODIUM SERPL-SCNC: 143 MMOL/L (ref 136–145)
WBC # BLD AUTO: 4.13 THOUSAND/UL (ref 4.31–10.16)

## 2021-01-18 PROCEDURE — 36415 COLL VENOUS BLD VENIPUNCTURE: CPT | Performed by: INTERNAL MEDICINE

## 2021-01-18 PROCEDURE — 80053 COMPREHEN METABOLIC PANEL: CPT | Performed by: INTERNAL MEDICINE

## 2021-01-18 PROCEDURE — 85025 COMPLETE CBC W/AUTO DIFF WBC: CPT | Performed by: INTERNAL MEDICINE

## 2021-01-18 PROCEDURE — 83918 ORGANIC ACIDS TOTAL QUANT: CPT

## 2021-01-23 LAB
METHYLMALONATE SERPL-SCNC: 355 NMOL/L (ref 0–378)
SL AMB DISCLAIMER: NORMAL

## 2021-02-13 DIAGNOSIS — Z23 ENCOUNTER FOR IMMUNIZATION: ICD-10-CM

## 2021-02-15 ENCOUNTER — TELEPHONE (OUTPATIENT)
Dept: HEMATOLOGY ONCOLOGY | Facility: CLINIC | Age: 71
End: 2021-02-15

## 2021-02-15 NOTE — TELEPHONE ENCOUNTER
Patient called to get clearance on getting the COVID-19 vaccine  Patient is scheduled for 2-   Please call patient back at [phone]

## 2021-02-17 ENCOUNTER — IMMUNIZATIONS (OUTPATIENT)
Dept: FAMILY MEDICINE CLINIC | Facility: HOSPITAL | Age: 71
End: 2021-02-17

## 2021-02-17 DIAGNOSIS — Z23 ENCOUNTER FOR IMMUNIZATION: Primary | ICD-10-CM

## 2021-02-17 PROCEDURE — 91300 SARS-COV-2 / COVID-19 MRNA VACCINE (PFIZER-BIONTECH) 30 MCG: CPT

## 2021-02-17 PROCEDURE — 0001A SARS-COV-2 / COVID-19 MRNA VACCINE (PFIZER-BIONTECH) 30 MCG: CPT

## 2021-03-03 ENCOUNTER — TELEPHONE (OUTPATIENT)
Dept: HEMATOLOGY ONCOLOGY | Facility: CLINIC | Age: 71
End: 2021-03-03

## 2021-03-03 DIAGNOSIS — D47.3 ESSENTIAL THROMBOCYTOSIS (HCC): ICD-10-CM

## 2021-03-03 RX ORDER — HYDROXYUREA 500 MG/1
500 CAPSULE ORAL DAILY
Qty: 30 CAPSULE | Refills: 6 | Status: SHIPPED | OUTPATIENT
Start: 2021-03-03 | End: 2021-09-16 | Stop reason: SDUPTHER

## 2021-03-03 NOTE — TELEPHONE ENCOUNTER
daMedication Refill     Who is Calling  Patient    Medication hydroxyurea (HYDREA) 500 mg       How many pills left  6   Preferred [facility]    Call back number [phone]   Relevant Information

## 2021-03-06 ENCOUNTER — LAB (OUTPATIENT)
Dept: LAB | Facility: CLINIC | Age: 71
End: 2021-03-06
Payer: MEDICARE

## 2021-03-07 NOTE — PROGRESS NOTES
Hematology/Oncology Outpatient Follow- up Note  Raffi Luque, 1950, [id number]  3/11/2021        Chief Complaint   Patient presents with    Follow-up       HPI:  Raffi Luque is a 72 yo female who was seen for initial consultation on 2020 regarding thrombocytosis   Her thrombocytosis was significant with platelet count in the range of 700 on several occasions   She has an elevated platelet count dating back to 2020  The remainder of her cell lines are WNL  Given that she has had consistently elevated platelets over the past 7 months, a myeloproliferative workup was ordered   Results of this indicate a positive JAK2 mutation confirming a diagnosis of essential thrombocytosis    Previous Hematologic History:    Workup    Current Hematologic/ Oncologic Treatment:    Hydrea 500 mg daily started on 20    ECO - Asymptomatic    Interval History:   The patient presents for routine follow up  She was last seen by Dr Irena Zimmerman on 12/10/20  She continues to take Hydrea daily  Her Most recent blood work completed on 3/6 was reviewed  Her white count was 3 51, hemoglobin 11 8, , platelets 850  CMP normal  MMA normal 355    She is tolerating hydrea without any significant side effects  Denies any nausea, diarrhea  Her weight is stable  She does have psoriasis, she is taking [place] for this  Overall, patient states she is doing well and denies any significant concerns today       Cancer Staging:  Cancer Staging  No matching staging information was found for the patient  Molecular Testing:       Test Results:    Imaging: No results found      Labs:   Lab Results   Component Value Date    WBC 3 51 (L) 2021    HGB 11 8 2021    HCT 36 7 2021     (H) 2021     2021     Lab Results   Component Value Date    K 4 1 2021     2021    CO2 28 2021    BUN 19 2021    CREATININE 1 03 2021    GLUF 67 2021 CALCIUM 9 4 03/06/2021    AST 15 03/06/2021    ALT 20 03/06/2021    ALKPHOS 50 03/06/2021    EGFR 55 03/06/2021           Review of Systems   All other systems reviewed and are negative  Active Problems:   Patient Active Problem List   Diagnosis    TB lung, latent    [facility]atic [facility] ([facility] )    Essential thrombocytosis ([facility] )       Past Medical History:   Past Medical History:   Diagnosis Date    Hyperlipidemia     Hypertension     Psoriasis        Surgical History:   Past Surgical History:   Procedure Laterality Date    APPENDECTOMY         Family History:    Family History   Problem Relation Age of Onset    Tuberculosis Father     Tuberculosis Brother        Cancer-related family history is not on file      Social History:   Social History     Socioeconomic History    Marital status:      Spouse name: Not on file    Number of children: Not on file    Years of education: Not on file    Highest education level: Not on file   Occupational History    Not on file   Social Needs    Financial resource strain: Not on file    Food insecurity     Worry: Not on file     Inability: Not on file    Transportation needs     Medical: Not on file     Non-medical: Not on file   Tobacco Use    Smoking status: Former Smoker    Smokeless tobacco: Never Used    Tobacco comment: Quit 2010   Substance and Sexual Activity    Alcohol use: Not on file    Drug use: Not on file    Sexual activity: Not on file   Lifestyle    Physical activity     Days per week: Not on file     Minutes per session: Not on file    Stress: Not on file   Relationships    Social connections     Talks on phone: Not on file     Gets together: Not on file     Attends [employer] service: Not on file     Active member of club or organization: Not on file     Attends meetings of clubs or organizations: Not on file     Relationship status: Not on file    Intimate partner violence     Fear of current or ex partner: Not on file Emotionally abused: Not on file     Physically abused: Not on file     Forced sexual activity: Not on file   Other Topics Concern    Not on file   Social History Narrative    Not on file       Current Medications:   Current Outpatient Medications   Medication Sig Dispense Refill    amLODIPine (NORVASC) 5 mg tablet 5 mg 2 (two) times a day       Apremilast (OTEZLA) 30 MG TABS 30 mg Every 12 hours       aspirin 81 MG tablet Take 81 mg by mouth daily       Cholecalciferol (VITAMIN D3) 5000 units TABS 5,000 Units Daily       diphenhydrAMINE (BENADRYL) 25 mg capsule Take 25 mg by mouth every 12 (twelve) hours as needed for itching       Fluocinolone Acetonide Scalp 0 01 % OIL       halobetasol (ULTRAVATE) 0 05 % ointment       hydrocortisone 2 5 % ointment       hydroxyurea (HYDREA) 500 mg capsule Take 1 capsule (500 mg total) by mouth daily 30 capsule 6    losartan (COZAAR) 50 mg tablet Take 50 mg by mouth daily       Probiotic Product (PROBIOTIC DAILY PO) Take 1 capsule by mouth daily      rosuvastatin (CRESTOR) 5 mg tablet Take 5 mg by mouth daily       calcipotriene (DOVONEX) 0 005 % cream calcipotriene 0 005 % topical cream   APPLY TWICE A DAY TO PSORIASIS MONDAY-FRIDAY      chlorhexidine (PERIDEX) 0 12 % solution       Fluocinolone Acetonide Body (DERMA-SMOOTHE/FS BODY) 0 01 % OIL Apply topically      Halobetasol Propionate 0 01 % LOTN halobetasol propionate 0 05 % topical ointment      pyridoxine (VITAMIN B6) 50 mg tablet Take 1 tablet (50 mg total) by mouth daily 30 tablet 8     No current facility-administered medications for this visit  Allergies: No Known Allergies    Physical Exam:  /82   Pulse 66   Temp 98 1 °F (36 7 °C) (Temporal)   Resp 16   Ht 5' 2" (1 575 m)   Wt 54 kg (119 lb)   SpO2 98%   BMI 21 77 kg/m²   Body surface area is 1 53 meters squared      Wt Readings from Last 3 Encounters:   03/11/21 54 kg (119 lb)   12/10/20 54 9 kg (121 lb)   10/06/20 54 4 kg (120 lb)           Physical Exam  Constitutional:       Appearance: Normal appearance  HENT:      Head: Normocephalic and atraumatic  Eyes:      General: No scleral icterus  Right eye: No discharge  Left eye: No discharge  Extraocular Movements: Extraocular movements intact  Conjunctiva/sclera: Conjunctivae normal    Neck:      Musculoskeletal: Normal range of motion  Cardiovascular:      Rate and Rhythm: Normal rate and regular rhythm  Heart sounds: Normal heart sounds  Pulmonary:      Effort: Pulmonary effort is normal       Breath sounds: Normal breath sounds  Abdominal:      General: Bowel sounds are normal       Palpations: Abdomen is soft  Musculoskeletal: Normal range of motion  Lymphadenopathy:      Cervical: No cervical adenopathy  Skin:     General: Skin is warm and dry  Findings: Rash (Patches of plaque psoriasis) present  Neurological:      General: No focal deficit present  Mental Status: She is alert and oriented to person, place, and time  Psychiatric:         Mood and Affect: Mood normal          Behavior: Behavior normal          Assessment / Plan:    1  Essential thrombocytosis (HCC)    The patient is a very pleasant 66-year-old female   Myeloproliferative workup confirmed JAK2 positive essential thrombocytosis   She was started on Hydrea when her platelet count approached 800  This was back on 07/12/2020  She has been maintained on Hydrea 500 mg daily with a nice response  Her platelet count has now normalized and has remained stable for the last several months  She is tolerating this dose without any side effects  Her most recent CBC does demonstrate a mildly decreased white count  3 51, normal hemoglobin, elevated MCV of 112 in a normal platelet count of 485  Her differential is normal   I reviewed with patient today that her mild leukopenia is secondary to her Hydrea    Her MMA was normal so her mild macrocytosis may be secondary to her myeloproliferative disorder  She will continue on current dose without change and we will continue to monitor her blood work on a monthly basis  Patient will be notified of any significant change in her blood work  She will return for follow-up visit in 3 months with another CBC and CMP  Patient verbalized understanding and was in agreement with the plan of care  She was instructed to call with any questions or concerns prior to her next office visit    Goals and Barriers:  Current Goal:  Prolong Survival from ET  Barriers: None  Patient's Capacity to Self Care:  Patient is able to self care  Portions of the record may have been created with voice recognition software  Occasional wrong word or "sound a like" substitutions may have occurred due to the inherent limitations of voice recognition software  Read the chart carefully and recognize, using context, where substitutions have occurred

## 2021-03-09 ENCOUNTER — IMMUNIZATIONS (OUTPATIENT)
Dept: FAMILY MEDICINE CLINIC | Facility: HOSPITAL | Age: 71
End: 2021-03-09

## 2021-03-09 DIAGNOSIS — Z23 ENCOUNTER FOR IMMUNIZATION: Primary | ICD-10-CM

## 2021-03-09 PROCEDURE — 91300 SARS-COV-2 / COVID-19 MRNA VACCINE (PFIZER-BIONTECH) 30 MCG: CPT

## 2021-03-09 PROCEDURE — 0002A SARS-COV-2 / COVID-19 MRNA VACCINE (PFIZER-BIONTECH) 30 MCG: CPT

## 2021-03-11 ENCOUNTER — OFFICE VISIT (OUTPATIENT)
Dept: HEMATOLOGY ONCOLOGY | Facility: CLINIC | Age: 71
End: 2021-03-11
Payer: MEDICARE

## 2021-03-11 VITALS
OXYGEN SATURATION: 98 % | HEIGHT: 62 IN | RESPIRATION RATE: 16 BRPM | SYSTOLIC BLOOD PRESSURE: 126 MMHG | WEIGHT: 119 LBS | HEART RATE: 66 BPM | TEMPERATURE: 98.1 F | DIASTOLIC BLOOD PRESSURE: 82 MMHG | BODY MASS INDEX: 21.9 KG/M2

## 2021-03-11 DIAGNOSIS — D47.3 ESSENTIAL THROMBOCYTOSIS (HCC): Primary | ICD-10-CM

## 2021-03-11 PROCEDURE — 99214 OFFICE O/P EST MOD 30 MIN: CPT | Performed by: NURSE PRACTITIONER

## 2021-04-20 ENCOUNTER — APPOINTMENT (OUTPATIENT)
Dept: LAB | Facility: CLINIC | Age: 71
End: 2021-04-20
Payer: MEDICARE

## 2021-04-20 ENCOUNTER — TRANSCRIBE ORDERS (OUTPATIENT)
Dept: LAB | Facility: CLINIC | Age: 71
End: 2021-04-20

## 2021-06-08 ENCOUNTER — APPOINTMENT (OUTPATIENT)
Dept: LAB | Facility: CLINIC | Age: 71
End: 2021-06-08
Payer: MEDICARE

## 2021-06-11 NOTE — PROGRESS NOTES
Hematology/Oncology Outpatient Follow- up Note  Anthony Cheng, 1950, [id number]  6/15/2021        Chief Complaint   Patient presents with    Follow-up       HPI:  Anthony Cheng is a 78 yo female who was seen for initial consultation on 2020 regarding thrombocytosis   Her thrombocytosis was significant with platelet count in the range of 700 on several occasions   She has an elevated platelet count dating back to 2020  The remainder of her cell lines are WNL  Given that she has had consistently elevated platelets over the past 7 months, a myeloproliferative workup was ordered   Results of this indicate a positive JAK2 mutation confirming a diagnosis of essential thrombocytosis    Previous Hematologic History:    Workup    Current Hematologic/ Oncologic Treatment:    Hydrea 500 mg daily started on 20    ECO - Asymptomatic    Interval History:   The patient presents for routine follow up  She was last seen on 3/11  She continues to take Hydrea daily  Her Most recent blood work completed on  was reviewed  Her white count was 4 39, hemoglobin 12 2, , platelets 757  CMP normal     She is tolerating hydrea without any significant side effects  Denies any nausea, diarrhea  Her weight is stable  Overall, patient appears well and states she is doing well and denies any significant concerns today       Cancer Staging:  Cancer Staging  No matching staging information was found for the patient  Molecular Testing:       Test Results:    Imaging: No results found      Labs:   Lab Results   Component Value Date    WBC 4 39 2021    HGB 12 2 2021    HCT 36 7 2021     (H) 2021     2021     Lab Results   Component Value Date    K 4 4 2021     2021    CO2 27 2021    BUN 18 2021    CREATININE 0 97 2021    GLUF 67 2021    CALCIUM 9 3 2021    AST 22 2021    ALT 25 2021    ALKPHOS 50 06/08/2021    EGFR 59 06/08/2021           Review of Systems   All other systems reviewed and are negative  Active Problems:   Patient Active Problem List   Diagnosis    TB lung, latent    [facility] )    Essential [facility]ocytosis ([facility] )       Past Medical History:   Past Medical History:   Diagnosis Date    Hyperlipidemia     Hypertension     Psoriasis        Surgical History:   Past Surgical History:   Procedure Laterality Date    APPENDECTOMY         Family History:    Family History   Problem Relation Age of Onset    Tuberculosis Father     Tuberculosis Brother        Cancer-related family history is not on file  Social History:   Social History     Socioeconomic History    Marital status:      Spouse name: Not on file    Number of children: Not on file    Years of education: Not on file    Highest education level: Not on file   Occupational History    Not on file   Tobacco Use    Smoking status: Former Smoker    Smokeless tobacco: Never Used    Tobacco comment: Quit 2010   Substance and Sexual Activity    Alcohol use: Not on file    Drug use: Not on file    Sexual activity: Not on file   Other Topics Concern    Not on file   Social History Narrative    Not on file     Social Determinants of Health     Financial Resource Strain:     Difficulty of Paying Living Expenses:    Food Insecurity:     Worried About Running Out of Food in the Last Year:     [address] in the Last Year:    Transportation Needs:     Lack of Transportation (Medical):      Lack of Transportation (Non-Medical):    Physical Activity:     Days of Exercise per Week:     Minutes of Exercise per Session:    Stress:     Feeling of Stress :    Social Connections:     Frequency of Communication with Friends and Family:     Frequency of Social Gatherings with Friends and Family:     Attends [employer] Services:     Active Member of Clubs or Organizations:     Attends Club or Organization Meetings:     Marital Status:    Intimate Partner Violence:     Fear of Current or Ex-Partner:     Emotionally Abused:     Physically Abused:     Sexually Abused:        Current Medications:   Current Outpatient Medications   Medication Sig Dispense Refill    amLODIPine (NORVASC) 5 mg tablet 5 mg 2 (two) times a day       Apremilast (OTEZLA) 30 MG TABS 30 mg Every 12 hours       aspirin 81 MG tablet Take 81 mg by mouth daily       Cholecalciferol (VITAMIN D3) 5000 units TABS 5,000 Units Daily       diphenhydrAMINE (BENADRYL) 25 mg capsule Take 25 mg by mouth every 12 (twelve) hours as needed for itching       Fluocinolone Acetonide Scalp 0 01 % OIL       halobetasol (ULTRAVATE) 0 05 % ointment       hydrocortisone 2 5 % ointment       hydroxyurea (HYDREA) 500 mg capsule Take 1 capsule (500 mg total) by mouth daily 30 capsule 6    losartan (COZAAR) 50 mg tablet Take 50 mg by mouth daily       Probiotic Product (PROBIOTIC DAILY PO) Take 1 capsule by mouth daily      promethazine-dextromethorphan (PHENERGAN-DM) 6 25-15 mg/5 mL oral syrup promethazine-DM 6 25 mg-15 mg/5 mL oral syrup      rosuvastatin (CRESTOR) 5 mg tablet Take 5 mg by mouth daily       calcipotriene (DOVONEX) 0 005 % cream calcipotriene 0 005 % topical cream   APPLY TWICE A DAY TO PSORIASIS MONDAY-FRIDAY      chlorhexidine (PERIDEX) 0 12 % solution       Fluocinolone Acetonide Body (DERMA-SMOOTHE/FS BODY) 0 01 % OIL Apply topically      Halobetasol Propionate 0 01 % LOTN halobetasol propionate 0 05 % topical ointment      pyridoxine (VITAMIN B6) 50 mg tablet Take 1 tablet (50 mg total) by mouth daily 30 tablet 8     No current facility-administered medications for this visit         Allergies: No Known Allergies    Physical Exam:  /62 (BP Location: Left arm)   Pulse 75   Temp 98 °F (36 7 °C) (Temporal)   Resp 16   Ht 5' 2" (1 575 m)   Wt 53 5 kg (118 lb)   SpO2 97%   BMI 21 58 kg/m²   Body surface area is 1 53 meters squared  Wt Readings from Last 3 Encounters:   06/15/21 53 5 kg (118 lb)   03/11/21 54 kg (119 lb)   12/10/20 54 9 kg (121 lb)           Physical Exam  Constitutional:       Appearance: Normal appearance  HENT:      Head: Normocephalic and atraumatic  Eyes:      General: No scleral icterus  Right eye: No discharge  Left eye: No discharge  Pupils: Pupils are equal, round, and reactive to light  Cardiovascular:      Rate and Rhythm: Normal rate and regular rhythm  Heart sounds: Normal heart sounds  Pulmonary:      Effort: Pulmonary effort is normal       Breath sounds: Normal breath sounds  Abdominal:      General: Bowel sounds are normal       Palpations: Abdomen is soft  Musculoskeletal:         General: Normal range of motion  Cervical back: Normal range of motion  Lymphadenopathy:      Cervical: No cervical adenopathy  Skin:     General: Skin is warm and dry  Neurological:      General: No focal deficit present  Mental Status: She is alert and oriented to person, place, and time  Psychiatric:         Mood and Affect: Mood normal          Behavior: Behavior normal          Assessment / Plan:    1  Essential thrombocytosis (HCC)    The patient is a very pleasant 41-year-old female   Myeloproliferative workup confirmed JAK2 positive essential thrombocytosis   She was started on Hydrea when her platelet count approached 800  This was back on 07/12/2020  She has been maintained on Hydrea 500 mg daily with a nice response  Her platelet count has now normalized and has remained stable for the last several months  She is tolerating this dose without any side effects  Her most recent CBC demonstrates a normal white count, normal hemoglobin, elevated MCV of 112 and a normal platelet count of 630    Her differential is normal   Her mild macrocytosis is stable and likely secondary to her myeloproliferative disorder  She will continue on current dose without change  She will return for follow-up visit in 3 months with another CBC and CMP  Patient verbalized understanding and was in agreement with the plan of care  She was instructed to call with any questions or concerns prior to her next office visit    Goals and Barriers:  Current Goal:  Prolong Survival from ET  Barriers: None  Patient's Capacity to Self Care:  Patient is able to self care  Portions of the record may have been created with voice recognition software  Occasional wrong word or "sound a like" substitutions may have occurred due to the inherent limitations of voice recognition software  Read the chart carefully and recognize, using context, where substitutions have occurred

## 2021-06-15 ENCOUNTER — OFFICE VISIT (OUTPATIENT)
Dept: HEMATOLOGY ONCOLOGY | Facility: CLINIC | Age: 71
End: 2021-06-15
Payer: MEDICARE

## 2021-06-15 VITALS
TEMPERATURE: 98 F | OXYGEN SATURATION: 97 % | HEIGHT: 62 IN | HEART RATE: 75 BPM | WEIGHT: 118 LBS | BODY MASS INDEX: 21.71 KG/M2 | RESPIRATION RATE: 16 BRPM | DIASTOLIC BLOOD PRESSURE: 62 MMHG | SYSTOLIC BLOOD PRESSURE: 102 MMHG

## 2021-06-15 DIAGNOSIS — D47.3 ESSENTIAL THROMBOCYTOSIS (HCC): Primary | ICD-10-CM

## 2021-06-15 PROCEDURE — 99214 OFFICE O/P EST MOD 30 MIN: CPT | Performed by: NURSE PRACTITIONER

## 2021-06-15 RX ORDER — DEXTROMETHORPHAN HYDROBROMIDE AND PROMETHAZINE HYDROCHLORIDE 15; 6.25 MG/5ML; MG/5ML
SYRUP ORAL
COMMUNITY

## 2021-07-08 ENCOUNTER — APPOINTMENT (OUTPATIENT)
Dept: LAB | Facility: CLINIC | Age: 71
End: 2021-07-08
Payer: MEDICARE

## 2021-07-08 DIAGNOSIS — Z79.899 ENCOUNTER FOR LONG-TERM (CURRENT) USE OF OTHER MEDICATIONS: ICD-10-CM

## 2021-07-08 DIAGNOSIS — I10 HYPERTENSION, UNSPECIFIED TYPE: ICD-10-CM

## 2021-07-08 DIAGNOSIS — E78.5 HYPERLIPIDEMIA, UNSPECIFIED HYPERLIPIDEMIA TYPE: ICD-10-CM

## 2021-07-08 DIAGNOSIS — D47.3 ESSENTIAL THROMBOCYTOSIS (HCC): ICD-10-CM

## 2021-07-08 DIAGNOSIS — R10.9 STOMACH ACHE: ICD-10-CM

## 2021-07-08 DIAGNOSIS — E55.9 AVITAMINOSIS D: ICD-10-CM

## 2021-07-08 LAB
25(OH)D3 SERPL-MCNC: 69.7 NG/ML (ref 30–100)
ALBUMIN SERPL BCP-MCNC: 4 G/DL (ref 3.5–5)
ALP SERPL-CCNC: 50 U/L (ref 46–116)
ALT SERPL W P-5'-P-CCNC: 32 U/L (ref 12–78)
AMYLASE SERPL-CCNC: 94 IU/L (ref 25–115)
ANION GAP SERPL CALCULATED.3IONS-SCNC: 8 MMOL/L (ref 4–13)
AST SERPL W P-5'-P-CCNC: 22 U/L (ref 5–45)
BASOPHILS # BLD AUTO: 0.04 THOUSANDS/ΜL (ref 0–0.1)
BASOPHILS NFR BLD AUTO: 1 % (ref 0–1)
BILIRUB SERPL-MCNC: 0.52 MG/DL (ref 0.2–1)
BUN SERPL-MCNC: 15 MG/DL (ref 5–25)
CALCIUM SERPL-MCNC: 9.3 MG/DL (ref 8.3–10.1)
CHLORIDE SERPL-SCNC: 107 MMOL/L (ref 100–108)
CHOLEST SERPL-MCNC: 169 MG/DL (ref 50–200)
CO2 SERPL-SCNC: 28 MMOL/L (ref 21–32)
CREAT SERPL-MCNC: 1.06 MG/DL (ref 0.6–1.3)
EOSINOPHIL # BLD AUTO: 0.12 THOUSAND/ΜL (ref 0–0.61)
EOSINOPHIL NFR BLD AUTO: 3 % (ref 0–6)
ERYTHROCYTE [DISTWIDTH] IN BLOOD BY AUTOMATED COUNT: 13.2 % (ref 11.6–15.1)
GFR SERPL CREATININE-BSD FRML MDRD: 53 ML/MIN/1.73SQ M
GLUCOSE P FAST SERPL-MCNC: 93 MG/DL (ref 65–99)
HCT VFR BLD AUTO: 38.3 % (ref 34.8–46.1)
HDLC SERPL-MCNC: 97 MG/DL
HGB BLD-MCNC: 12.7 G/DL (ref 11.5–15.4)
IMM GRANULOCYTES # BLD AUTO: 0.02 THOUSAND/UL (ref 0–0.2)
IMM GRANULOCYTES NFR BLD AUTO: 1 % (ref 0–2)
LDLC SERPL CALC-MCNC: 51 MG/DL (ref 0–100)
LDLC SERPL DIRECT ASSAY-MCNC: 60 MG/DL (ref 0–100)
LIPASE SERPL-CCNC: 197 U/L (ref 73–393)
LYMPHOCYTES # BLD AUTO: 0.9 THOUSANDS/ΜL (ref 0.6–4.47)
LYMPHOCYTES NFR BLD AUTO: 23 % (ref 14–44)
MCH RBC QN AUTO: 37.2 PG (ref 26.8–34.3)
MCHC RBC AUTO-ENTMCNC: 33.2 G/DL (ref 31.4–37.4)
MCV RBC AUTO: 112 FL (ref 82–98)
MONOCYTES # BLD AUTO: 0.39 THOUSAND/ΜL (ref 0.17–1.22)
MONOCYTES NFR BLD AUTO: 10 % (ref 4–12)
NEUTROPHILS # BLD AUTO: 2.46 THOUSANDS/ΜL (ref 1.85–7.62)
NEUTS SEG NFR BLD AUTO: 62 % (ref 43–75)
NONHDLC SERPL-MCNC: 72 MG/DL
NRBC BLD AUTO-RTO: 0 /100 WBCS
PLATELET # BLD AUTO: 297 THOUSANDS/UL (ref 149–390)
PMV BLD AUTO: 8.7 FL (ref 8.9–12.7)
POTASSIUM SERPL-SCNC: 3.7 MMOL/L (ref 3.5–5.3)
PROT SERPL-MCNC: 7 G/DL (ref 6.4–8.2)
RBC # BLD AUTO: 3.41 MILLION/UL (ref 3.81–5.12)
SODIUM SERPL-SCNC: 143 MMOL/L (ref 136–145)
TRIGL SERPL-MCNC: 105 MG/DL
WBC # BLD AUTO: 3.93 THOUSAND/UL (ref 4.31–10.16)

## 2021-07-08 PROCEDURE — 83690 ASSAY OF LIPASE: CPT

## 2021-07-08 PROCEDURE — 82150 ASSAY OF AMYLASE: CPT

## 2021-07-08 PROCEDURE — 83721 ASSAY OF BLOOD LIPOPROTEIN: CPT

## 2021-07-08 PROCEDURE — 36415 COLL VENOUS BLD VENIPUNCTURE: CPT

## 2021-07-08 PROCEDURE — 85025 COMPLETE CBC W/AUTO DIFF WBC: CPT

## 2021-07-08 PROCEDURE — 80053 COMPREHEN METABOLIC PANEL: CPT

## 2021-07-08 PROCEDURE — 82306 VITAMIN D 25 HYDROXY: CPT

## 2021-07-08 PROCEDURE — 80061 LIPID PANEL: CPT

## 2021-08-16 ENCOUNTER — TELEPHONE (OUTPATIENT)
Dept: HEMATOLOGY ONCOLOGY | Facility: CLINIC | Age: 71
End: 2021-08-16

## 2021-08-16 DIAGNOSIS — D47.3 ESSENTIAL THROMBOCYTOSIS (HCC): Primary | ICD-10-CM

## 2021-08-16 NOTE — TELEPHONE ENCOUNTER
Patient is calling in requesting for her lab orders to be placed in her chart, she can be reached back at [phone]

## 2021-08-16 NOTE — TELEPHONE ENCOUNTER
Patient called and advised that CBCD and CMP orders were entered into Epic  Patient verbalized understanding of above  RN-FYI

## 2021-08-17 ENCOUNTER — APPOINTMENT (OUTPATIENT)
Dept: LAB | Facility: CLINIC | Age: 71
End: 2021-08-17
Payer: MEDICARE

## 2021-08-17 DIAGNOSIS — D47.3 ESSENTIAL THROMBOCYTOSIS (HCC): ICD-10-CM

## 2021-08-17 LAB
ALBUMIN SERPL BCP-MCNC: 4 G/DL (ref 3.5–5)
ALP SERPL-CCNC: 45 U/L (ref 46–116)
ALT SERPL W P-5'-P-CCNC: 23 U/L (ref 12–78)
ANION GAP SERPL CALCULATED.3IONS-SCNC: 9 MMOL/L (ref 4–13)
AST SERPL W P-5'-P-CCNC: 19 U/L (ref 5–45)
BASOPHILS # BLD AUTO: 0.05 THOUSANDS/ΜL (ref 0–0.1)
BASOPHILS NFR BLD AUTO: 1 % (ref 0–1)
BILIRUB SERPL-MCNC: 0.55 MG/DL (ref 0.2–1)
BUN SERPL-MCNC: 17 MG/DL (ref 5–25)
CALCIUM SERPL-MCNC: 9.6 MG/DL (ref 8.3–10.1)
CHLORIDE SERPL-SCNC: 106 MMOL/L (ref 100–108)
CO2 SERPL-SCNC: 28 MMOL/L (ref 21–32)
CREAT SERPL-MCNC: 1.07 MG/DL (ref 0.6–1.3)
EOSINOPHIL # BLD AUTO: 0.1 THOUSAND/ΜL (ref 0–0.61)
EOSINOPHIL NFR BLD AUTO: 3 % (ref 0–6)
ERYTHROCYTE [DISTWIDTH] IN BLOOD BY AUTOMATED COUNT: 13.1 % (ref 11.6–15.1)
GFR SERPL CREATININE-BSD FRML MDRD: 53 ML/MIN/1.73SQ M
GLUCOSE P FAST SERPL-MCNC: 85 MG/DL (ref 65–99)
HCT VFR BLD AUTO: 37.3 % (ref 34.8–46.1)
HGB BLD-MCNC: 12.3 G/DL (ref 11.5–15.4)
IMM GRANULOCYTES # BLD AUTO: 0.01 THOUSAND/UL (ref 0–0.2)
IMM GRANULOCYTES NFR BLD AUTO: 0 % (ref 0–2)
LYMPHOCYTES # BLD AUTO: 1.07 THOUSANDS/ΜL (ref 0.6–4.47)
LYMPHOCYTES NFR BLD AUTO: 29 % (ref 14–44)
MCH RBC QN AUTO: 37.2 PG (ref 26.8–34.3)
MCHC RBC AUTO-ENTMCNC: 33 G/DL (ref 31.4–37.4)
MCV RBC AUTO: 113 FL (ref 82–98)
MONOCYTES # BLD AUTO: 0.38 THOUSAND/ΜL (ref 0.17–1.22)
MONOCYTES NFR BLD AUTO: 10 % (ref 4–12)
NEUTROPHILS # BLD AUTO: 2.1 THOUSANDS/ΜL (ref 1.85–7.62)
NEUTS SEG NFR BLD AUTO: 57 % (ref 43–75)
NRBC BLD AUTO-RTO: 0 /100 WBCS
PLATELET # BLD AUTO: 314 THOUSANDS/UL (ref 149–390)
PMV BLD AUTO: 8.9 FL (ref 8.9–12.7)
POTASSIUM SERPL-SCNC: 3.8 MMOL/L (ref 3.5–5.3)
PROT SERPL-MCNC: 6.7 G/DL (ref 6.4–8.2)
RBC # BLD AUTO: 3.31 MILLION/UL (ref 3.81–5.12)
SODIUM SERPL-SCNC: 143 MMOL/L (ref 136–145)
WBC # BLD AUTO: 3.71 THOUSAND/UL (ref 4.31–10.16)

## 2021-08-17 PROCEDURE — 36415 COLL VENOUS BLD VENIPUNCTURE: CPT

## 2021-08-17 PROCEDURE — 85025 COMPLETE CBC W/AUTO DIFF WBC: CPT

## 2021-08-17 PROCEDURE — 80053 COMPREHEN METABOLIC PANEL: CPT

## 2021-09-10 ENCOUNTER — APPOINTMENT (OUTPATIENT)
Dept: LAB | Facility: CLINIC | Age: 71
End: 2021-09-10
Payer: MEDICARE

## 2021-09-10 DIAGNOSIS — D47.3 ESSENTIAL THROMBOCYTOSIS (HCC): ICD-10-CM

## 2021-09-10 LAB
ALBUMIN SERPL BCP-MCNC: 4.2 G/DL (ref 3.5–5)
ALP SERPL-CCNC: 47 U/L (ref 46–116)
ALT SERPL W P-5'-P-CCNC: 19 U/L (ref 12–78)
ANION GAP SERPL CALCULATED.3IONS-SCNC: 8 MMOL/L (ref 4–13)
AST SERPL W P-5'-P-CCNC: 21 U/L (ref 5–45)
BASOPHILS # BLD AUTO: 0.05 THOUSANDS/ΜL (ref 0–0.1)
BASOPHILS NFR BLD AUTO: 1 % (ref 0–1)
BILIRUB SERPL-MCNC: 0.36 MG/DL (ref 0.2–1)
BUN SERPL-MCNC: 22 MG/DL (ref 5–25)
CALCIUM SERPL-MCNC: 9.7 MG/DL (ref 8.3–10.1)
CHLORIDE SERPL-SCNC: 107 MMOL/L (ref 100–108)
CO2 SERPL-SCNC: 27 MMOL/L (ref 21–32)
CREAT SERPL-MCNC: 0.93 MG/DL (ref 0.6–1.3)
EOSINOPHIL # BLD AUTO: 0.1 THOUSAND/ΜL (ref 0–0.61)
EOSINOPHIL NFR BLD AUTO: 2 % (ref 0–6)
ERYTHROCYTE [DISTWIDTH] IN BLOOD BY AUTOMATED COUNT: 13.3 % (ref 11.6–15.1)
GFR SERPL CREATININE-BSD FRML MDRD: 62 ML/MIN/1.73SQ M
GLUCOSE P FAST SERPL-MCNC: 99 MG/DL (ref 65–99)
HCT VFR BLD AUTO: 35.5 % (ref 34.8–46.1)
HGB BLD-MCNC: 11.7 G/DL (ref 11.5–15.4)
IMM GRANULOCYTES # BLD AUTO: 0.02 THOUSAND/UL (ref 0–0.2)
IMM GRANULOCYTES NFR BLD AUTO: 0 % (ref 0–2)
LYMPHOCYTES # BLD AUTO: 1.05 THOUSANDS/ΜL (ref 0.6–4.47)
LYMPHOCYTES NFR BLD AUTO: 23 % (ref 14–44)
MCH RBC QN AUTO: 36.8 PG (ref 26.8–34.3)
MCHC RBC AUTO-ENTMCNC: 33 G/DL (ref 31.4–37.4)
MCV RBC AUTO: 112 FL (ref 82–98)
MONOCYTES # BLD AUTO: 0.4 THOUSAND/ΜL (ref 0.17–1.22)
MONOCYTES NFR BLD AUTO: 9 % (ref 4–12)
NEUTROPHILS # BLD AUTO: 3 THOUSANDS/ΜL (ref 1.85–7.62)
NEUTS SEG NFR BLD AUTO: 65 % (ref 43–75)
NRBC BLD AUTO-RTO: 0 /100 WBCS
PLATELET # BLD AUTO: 275 THOUSANDS/UL (ref 149–390)
PMV BLD AUTO: 8.6 FL (ref 8.9–12.7)
POTASSIUM SERPL-SCNC: 4.4 MMOL/L (ref 3.5–5.3)
PROT SERPL-MCNC: 6.8 G/DL (ref 6.4–8.2)
RBC # BLD AUTO: 3.18 MILLION/UL (ref 3.81–5.12)
SODIUM SERPL-SCNC: 142 MMOL/L (ref 136–145)
WBC # BLD AUTO: 4.62 THOUSAND/UL (ref 4.31–10.16)

## 2021-09-10 PROCEDURE — 36415 COLL VENOUS BLD VENIPUNCTURE: CPT

## 2021-09-10 PROCEDURE — 80053 COMPREHEN METABOLIC PANEL: CPT

## 2021-09-10 PROCEDURE — 85025 COMPLETE CBC W/AUTO DIFF WBC: CPT

## 2021-09-15 NOTE — PROGRESS NOTES
Hematology/Oncology Outpatient Follow- up Note  Michael Hoffman, 1950, [id number]  2021        Chief Complaint   Patient presents with    Follow-up       HPI:  Michael Hoffman is a 80 yo female who was seen for initial consultation on 2020 regarding thrombocytosis   Her thrombocytosis was significant with platelet count in the range of 700 on several occasions   She has an elevated platelet count dating back to 2020  The remainder of her cell lines are WNL  Given that she has had consistently elevated platelets over the past 7 months, a myeloproliferative workup was ordered   Results of this indicate a positive JAK2 mutation confirming a diagnosis of essential thrombocytosis    Previous Hematologic History:    Workup    Current Hematologic/ Oncologic Treatment:    Hydrea 500 mg daily started on 20    ECO - Asymptomatic    Interval History:   The patient presents for routine follow up  She was last seen on 6/15  She continues to take Hydrea daily  Her Most recent blood work completed on 9/10 was reviewed  Her white count was 4 62, hemoglobin 11 7, , platelets 198  CMP normal     She is tolerating hydrea without any significant side effects  Denies any nausea, diarrhea  Her weight is stable  Overall, patient appears well and states she is doing well and denies any significant concerns today       Cancer Staging:  Cancer Staging  No matching staging information was found for the patient  Molecular Testing:       Test Results:    Imaging: No results found      Labs:   Lab Results   Component Value Date    WBC 4 62 09/10/2021    HGB 11 7 09/10/2021    HCT 35 5 09/10/2021     (H) 09/10/2021     09/10/2021     Lab Results   Component Value Date    K 4 4 09/10/2021     09/10/2021    CO2 27 09/10/2021    BUN 22 09/10/2021    CREATININE 0 93 09/10/2021    GLUF 99 09/10/2021    CALCIUM 9 7 09/10/2021    AST 21 09/10/2021    ALT 19 09/10/2021    ALKPHOS 47 09/10/2021    EGFR 62 09/10/2021           Review of Systems   All other systems reviewed and are negative  Active Problems:   Patient Active Problem List   Diagnosis    TB lung, latent    Psoriatic arthritis ([address]    Essential thrombocytosis [facility] )       Past Medical History:   Past Medical History:   Diagnosis Date    Hyperlipidemia     Hypertension     Psoriasis        Surgical History:   Past Surgical History:   Procedure Laterality Date    APPENDECTOMY         Family History:    Family History   Problem Relation Age of Onset    Tuberculosis Father     Tuberculosis Brother        Cancer-related family history is not on file  Social History:   Social History     Socioeconomic History    Marital status:      Spouse name: Not on file    Number of children: Not on file    Years of education: Not on file    Highest education level: Not on file   Occupational History    Not on file   Tobacco Use    Smoking status: Former Smoker    Smokeless tobacco: Never Used    Tobacco comment: Quit 2010   Substance and Sexual Activity    Alcohol use: Not on file    Drug use: Not on file    Sexual activity: Not on file   Other Topics Concern    Not on file   Social History Narrative    Not on file     Social Determinants of Health     Financial Resource Strain:     Difficulty of Paying Living Expenses:    Food Insecurity:     Worried About Running Out of Food in the Last Year:     [address] in the Last Year:    Transportation Needs:     Lack of Transportation (Medical):      Lack of Transportation (Non-Medical):    Physical Activity:     Days of Exercise per Week:     Minutes of Exercise per Session:    Stress:     Feeling of Stress :    Social Connections:     Frequency of Communication with Friends and Family:     Frequency of Social Gatherings with Friends and Family:     Attends Rastafarian Services:     Active Member of Clubs or Organizations:     Attends Club or Organization Meetings:     Marital Status:    Intimate Partner Violence:     Fear of Current or Ex-Partner:     Emotionally Abused:     Physically Abused:     Sexually Abused:        Current Medications:   Current Outpatient Medications   Medication Sig Dispense Refill    amLODIPine (NORVASC) 5 mg tablet 5 mg 2 (two) times a day       Apremilast (OTEZLA) 30 MG TABS 30 mg Every 12 hours       aspirin 81 MG tablet Take 81 mg by mouth daily       Cholecalciferol (VITAMIN D3) 5000 units TABS 5,000 Units Daily       diphenhydrAMINE (BENADRYL) 25 mg capsule Take 25 mg by mouth every 12 (twelve) hours as needed for itching       Fluocinolone Acetonide Scalp 0 01 % OIL       hydrocortisone 2 5 % ointment       hydroxyurea (HYDREA) 500 mg capsule Take 1 capsule (500 mg total) by mouth daily 30 capsule 6    losartan (COZAAR) 50 mg tablet Take 50 mg by mouth daily       Probiotic Product (PROBIOTIC DAILY PO) Take 1 capsule by mouth daily      rosuvastatin (CRESTOR) 5 mg tablet Take 5 mg by mouth daily       calcipotriene (DOVONEX) 0 005 % cream calcipotriene 0 005 % topical cream   APPLY TWICE A DAY TO PSORIASIS MONDAY-FRIDAY (Patient not taking: Reported on 9/16/2021)      chlorhexidine (PERIDEX) 0 12 % solution  (Patient not taking: Reported on 9/16/2021)      Fluocinolone Acetonide Body (DERMA-SMOOTHE/FS BODY) 0 01 % OIL Apply topically (Patient not taking: Reported on 9/16/2021)      halobetasol (ULTRAVATE) 0 05 % ointment  (Patient not taking: Reported on 9/16/2021)      Halobetasol Propionate 0 01 % LOTN halobetasol propionate 0 05 % topical ointment (Patient not taking: Reported on 9/16/2021)      promethazine-dextromethorphan (PHENERGAN-DM) 6 25-15 mg/5 mL oral syrup promethazine-DM 6 25 mg-15 mg/5 mL oral syrup (Patient not taking: Reported on 9/16/2021)      pyridoxine (VITAMIN B6) 50 mg tablet Take 1 tablet (50 mg total) by mouth daily (Patient not taking: Reported on 9/16/2021) 30 tablet 8 No current facility-administered medications for this visit  Allergies: No Known Allergies    Physical Exam:  /74 (BP Location: Left arm, Patient Position: Sitting, Cuff Size: Standard)   Pulse 64   Temp 98 1 °F (36 7 °C) (Tympanic)   Resp 16   Ht 5' 2" (1 575 m)   Wt 53 5 kg (118 lb)   SpO2 98%   BMI 21 58 kg/m²   Body surface area is 1 53 meters squared  Wt Readings from Last 3 Encounters:   09/16/21 53 5 kg (118 lb)   06/15/21 53 5 kg (118 lb)   03/11/21 54 kg (119 lb)           Physical Exam  Constitutional:       Appearance: Normal appearance  HENT:      Head: Normocephalic and atraumatic  Eyes:      General: No scleral icterus  Right eye: No discharge  Left eye: No discharge  Conjunctiva/sclera: Conjunctivae normal    Cardiovascular:      Rate and Rhythm: Normal rate and regular rhythm  Pulmonary:      Effort: Pulmonary effort is normal       Breath sounds: Normal breath sounds  Abdominal:      General: Bowel sounds are normal       Palpations: Abdomen is soft  Musculoskeletal:         General: Normal range of motion  Cervical back: Normal range of motion  Skin:     General: Skin is warm and dry  Neurological:      General: No focal deficit present  Mental Status: She is alert  Psychiatric:         Mood and Affect: Mood normal          Behavior: Behavior normal          Assessment / Plan:    1  Essential thrombocytosis (HCC)    The patient is a very pleasant 69-year-old female   Myeloproliferative workup confirmed JAK2 positive essential thrombocytosis   She was started on Hydrea when her platelet count approached 800  This was back on 07/12/2020  She has been maintained on Hydrea 500 mg daily with a nice response  Her platelet count has now normalized and has remained stable over the past year  She is tolerating this dose without any side effects      Her most recent CBC demonstrates a normal white count, normal hemoglobin, elevated MCV of 112 and a normal platelet count of 103  Her differential is normal   Her mild macrocytosis is stable and likely secondary to her myeloproliferative disorder  She will continue on current dose without change  Refill provided today  She will have blood work every 6 weeks and be called with any abnormality  She will return for follow-up visit in 3 months with another CBC and CMP  Patient verbalized understanding and was in agreement with the plan of care  She was instructed to call with any questions or concerns prior to her next office visit    Goals and Barriers:  Current Goal:  Prolong Survival from ET  Barriers: None  Patient's Capacity to Self Care:  Patient is able to self care  Portions of the record may have been created with voice recognition software  Occasional wrong word or "sound a like" substitutions may have occurred due to the inherent limitations of voice recognition software  Read the chart carefully and recognize, using context, where substitutions have occurred

## 2021-09-16 ENCOUNTER — OFFICE VISIT (OUTPATIENT)
Dept: HEMATOLOGY ONCOLOGY | Facility: CLINIC | Age: 71
End: 2021-09-16
Payer: MEDICARE

## 2021-09-16 VITALS
OXYGEN SATURATION: 98 % | HEIGHT: 62 IN | HEART RATE: 64 BPM | WEIGHT: 118 LBS | TEMPERATURE: 98.1 F | SYSTOLIC BLOOD PRESSURE: 122 MMHG | DIASTOLIC BLOOD PRESSURE: 74 MMHG | BODY MASS INDEX: 21.71 KG/M2 | RESPIRATION RATE: 16 BRPM

## 2021-09-16 DIAGNOSIS — D47.3 ESSENTIAL THROMBOCYTOSIS (HCC): Primary | ICD-10-CM

## 2021-09-16 PROCEDURE — 99214 OFFICE O/P EST MOD 30 MIN: CPT | Performed by: NURSE PRACTITIONER

## 2021-09-16 RX ORDER — HYDROXYUREA 500 MG/1
500 CAPSULE ORAL DAILY
Qty: 30 CAPSULE | Refills: 6 | Status: SHIPPED | OUTPATIENT
Start: 2021-09-16 | End: 2022-03-22 | Stop reason: SDUPTHER

## 2021-10-19 ENCOUNTER — TELEPHONE (OUTPATIENT)
Dept: SURGICAL ONCOLOGY | Facility: CLINIC | Age: 71
End: 2021-10-19

## 2021-10-19 ENCOUNTER — TELEPHONE (OUTPATIENT)
Dept: HEMATOLOGY ONCOLOGY | Facility: CLINIC | Age: 71
End: 2021-10-19

## 2021-10-27 ENCOUNTER — TELEPHONE (OUTPATIENT)
Dept: HEMATOLOGY ONCOLOGY | Facility: CLINIC | Age: 71
End: 2021-10-27

## 2021-11-27 ENCOUNTER — IMMUNIZATIONS (OUTPATIENT)
Dept: FAMILY MEDICINE CLINIC | Facility: HOSPITAL | Age: 71
End: 2021-11-27

## 2021-11-27 DIAGNOSIS — Z23 ENCOUNTER FOR IMMUNIZATION: Primary | ICD-10-CM

## 2021-11-27 PROCEDURE — 0001A COVID-19 PFIZER VACC 0.3 ML: CPT

## 2021-11-27 PROCEDURE — 91300 COVID-19 PFIZER VACC 0.3 ML: CPT

## 2021-12-07 ENCOUNTER — APPOINTMENT (OUTPATIENT)
Dept: LAB | Facility: HOSPITAL | Age: 71
End: 2021-12-07
Payer: MEDICARE

## 2021-12-21 ENCOUNTER — OFFICE VISIT (OUTPATIENT)
Dept: HEMATOLOGY ONCOLOGY | Facility: CLINIC | Age: 71
End: 2021-12-21
Payer: MEDICARE

## 2021-12-21 VITALS
DIASTOLIC BLOOD PRESSURE: 64 MMHG | TEMPERATURE: 97.2 F | RESPIRATION RATE: 18 BRPM | HEIGHT: 62 IN | BODY MASS INDEX: 21.25 KG/M2 | HEART RATE: 73 BPM | SYSTOLIC BLOOD PRESSURE: 112 MMHG | OXYGEN SATURATION: 97 % | WEIGHT: 115.5 LBS

## 2021-12-21 DIAGNOSIS — D47.3 ESSENTIAL THROMBOCYTOSIS (HCC): Primary | ICD-10-CM

## 2021-12-21 PROCEDURE — 99214 OFFICE O/P EST MOD 30 MIN: CPT | Performed by: NURSE PRACTITIONER

## 2022-03-14 ENCOUNTER — APPOINTMENT (OUTPATIENT)
Dept: LAB | Facility: CLINIC | Age: 72
End: 2022-03-14
Payer: MEDICARE

## 2022-03-14 DIAGNOSIS — D47.3 ESSENTIAL THROMBOCYTOSIS (HCC): ICD-10-CM

## 2022-03-14 LAB
ALBUMIN SERPL BCP-MCNC: 4 G/DL (ref 3.5–5)
ALP SERPL-CCNC: 40 U/L (ref 46–116)
ALT SERPL W P-5'-P-CCNC: 28 U/L (ref 12–78)
ANION GAP SERPL CALCULATED.3IONS-SCNC: 10 MMOL/L (ref 4–13)
AST SERPL W P-5'-P-CCNC: 24 U/L (ref 5–45)
BASOPHILS # BLD AUTO: 0.04 THOUSANDS/ΜL (ref 0–0.1)
BASOPHILS NFR BLD AUTO: 1 % (ref 0–1)
BILIRUB SERPL-MCNC: 0.48 MG/DL (ref 0.2–1)
BUN SERPL-MCNC: 18 MG/DL (ref 5–25)
CALCIUM SERPL-MCNC: 9.3 MG/DL (ref 8.3–10.1)
CHLORIDE SERPL-SCNC: 106 MMOL/L (ref 100–108)
CO2 SERPL-SCNC: 26 MMOL/L (ref 21–32)
CREAT SERPL-MCNC: 0.94 MG/DL (ref 0.6–1.3)
EOSINOPHIL # BLD AUTO: 0.06 THOUSAND/ΜL (ref 0–0.61)
EOSINOPHIL NFR BLD AUTO: 1 % (ref 0–6)
ERYTHROCYTE [DISTWIDTH] IN BLOOD BY AUTOMATED COUNT: 13 % (ref 11.6–15.1)
GFR SERPL CREATININE-BSD FRML MDRD: 61 ML/MIN/1.73SQ M
GLUCOSE SERPL-MCNC: 87 MG/DL (ref 65–140)
HCT VFR BLD AUTO: 35.1 % (ref 34.8–46.1)
HGB BLD-MCNC: 12.2 G/DL (ref 11.5–15.4)
IMM GRANULOCYTES # BLD AUTO: 0.01 THOUSAND/UL (ref 0–0.2)
IMM GRANULOCYTES NFR BLD AUTO: 0 % (ref 0–2)
LYMPHOCYTES # BLD AUTO: 0.73 THOUSANDS/ΜL (ref 0.6–4.47)
LYMPHOCYTES NFR BLD AUTO: 15 % (ref 14–44)
MCH RBC QN AUTO: 37.7 PG (ref 26.8–34.3)
MCHC RBC AUTO-ENTMCNC: 34.8 G/DL (ref 31.4–37.4)
MCV RBC AUTO: 108 FL (ref 82–98)
MONOCYTES # BLD AUTO: 0.4 THOUSAND/ΜL (ref 0.17–1.22)
MONOCYTES NFR BLD AUTO: 8 % (ref 4–12)
NEUTROPHILS # BLD AUTO: 3.65 THOUSANDS/ΜL (ref 1.85–7.62)
NEUTS SEG NFR BLD AUTO: 75 % (ref 43–75)
NRBC BLD AUTO-RTO: 0 /100 WBCS
PLATELET # BLD AUTO: 283 THOUSANDS/UL (ref 149–390)
PMV BLD AUTO: 8.9 FL (ref 8.9–12.7)
POTASSIUM SERPL-SCNC: 4.5 MMOL/L (ref 3.5–5.3)
PROT SERPL-MCNC: 6.6 G/DL (ref 6.4–8.2)
RBC # BLD AUTO: 3.24 MILLION/UL (ref 3.81–5.12)
SODIUM SERPL-SCNC: 142 MMOL/L (ref 136–145)
WBC # BLD AUTO: 4.89 THOUSAND/UL (ref 4.31–10.16)

## 2022-03-14 PROCEDURE — 80053 COMPREHEN METABOLIC PANEL: CPT | Performed by: NURSE PRACTITIONER

## 2022-03-14 PROCEDURE — 85025 COMPLETE CBC W/AUTO DIFF WBC: CPT | Performed by: NURSE PRACTITIONER

## 2022-03-14 PROCEDURE — 36415 COLL VENOUS BLD VENIPUNCTURE: CPT | Performed by: NURSE PRACTITIONER

## 2022-03-18 NOTE — PROGRESS NOTES
Hematology/Oncology Outpatient Follow- up Note  Dane Estrada, 1950, [id number]  3/22/2022        Chief Complaint   Patient presents with    Follow-up       HPI:  Dane Estrada is a 69 yo female who was seen for initial consultation on 2020 regarding thrombocytosis   Her thrombocytosis was significant with platelet count in the range of 700 on several occasions   She has an elevated platelet count dating back to 2020  The remainder of her cell lines are WNL  Given that she has had consistently elevated platelets over the past 7 months, a myeloproliferative workup was ordered   Results of this indicate a positive JAK2 mutation confirming a diagnosis of essential thrombocytosis    Previous Hematologic History:    Workup    Current Hematologic/ Oncologic Treatment:    Hydrea 500 mg daily started on 20    ECO - Asymptomatic    Interval History:   The patient presents for routine follow up  She was last seen on 21  She denies any change to her health since last visit  She continues to take Hydrea daily  Her Most recent blood work completed on 3/14 was reviewed  Her platelets remain in normal range at 283    White count, Hgb and differential are normal    CMP normal   Patient reports she is doing well  Feels well  Denies any concerning symptoms today  Test Results:    Imaging: No results found  Labs:   Lab Results   Component Value Date    WBC 4 89 2022    HGB 12 2 2022    HCT 35 1 2022     (H) 2022     2022     Lab Results   Component Value Date    K 4 5 2022     2022    CO2 26 2022    BUN 18 2022    CREATININE 0 94 2022    GLUF 105 2021    CALCIUM 9 3 2022    AST 24 2022    ALT 28 2022    ALKPHOS 40 (L) 2022    EGFR 61 2022           Review of Systems   All other systems reviewed and are negative          Active Problems:   Patient Active Problem List   Diagnosis    TB lung, latent    [facility]    Essential thrombocytosis ([facility] )       Past Medical History:   Past Medical History:   Diagnosis Date    Hyperlipidemia     Hypertension     Psoriasis        Surgical History:   Past Surgical History:   Procedure Laterality Date    APPENDECTOMY      MAMMO NEEDLE LOCALIZATION RIGHT [employer] Right 7/13/2009       Family History:    Family History   Problem Relation Age of Onset    Tuberculosis Father     Tuberculosis Brother        Cancer-related family history is not on file      Social History:   Social History     Socioeconomic History    Marital status:      Spouse name: Not on file    Number of children: Not on file    Years of education: Not on file    Highest education level: Not on file   Occupational History    Not on file   Tobacco Use    Smoking status: Former Smoker    Smokeless tobacco: Never Used    Tobacco comment: Quit 2010   Substance and Sexual Activity    Alcohol use: Not on file    Drug use: Not on file    Sexual activity: Not on file   Other Topics Concern    Not on file   Social History Narrative    Not on file     Social Determinants of Health     Financial Resource Strain: Not on file   Food Insecurity: Not on file   Transportation Needs: Not on file   Physical Activity: Not on file   Stress: Not on file   Social Connections: Not on file   Intimate Partner Violence: Not on file   Housing Stability: Not on file       Current Medications:   Current Outpatient Medications   Medication Sig Dispense Refill    amLODIPine (NORVASC) 5 mg tablet 5 mg 2 (two) times a day       Apremilast (OTEZLA) 30 MG TABS 30 mg Every 12 hours       aspirin 81 MG tablet Take 81 mg by mouth daily       Cholecalciferol (VITAMIN D3) 5000 units TABS 5,000 Units Daily       diphenhydrAMINE (BENADRYL) 25 mg capsule Take 25 mg by mouth every 12 (twelve) hours as needed for itching       Fluocinolone Acetonide Scalp 0 01 % OIL       halobetasol (ULTRAVATE) 0 05 % ointment        hydrocortisone 2 5 % ointment       hydroxyurea (HYDREA) 500 mg capsule Take 1 capsule (500 mg total) by mouth daily 30 capsule 6    losartan (COZAAR) 50 mg tablet Take 50 mg by mouth daily       Probiotic Product (PROBIOTIC DAILY PO) Take 1 capsule by mouth daily      rosuvastatin (CRESTOR) 5 mg tablet Take 5 mg by mouth daily       calcipotriene (DOVONEX) 0 005 % cream calcipotriene 0 005 % topical cream   APPLY TWICE A DAY TO PSORIASIS MONDAY-FRIDAY (Patient not taking: Reported on 9/16/2021)      chlorhexidine (PERIDEX) 0 12 % solution  (Patient not taking: Reported on 9/16/2021)      Fluocinolone Acetonide Body (DERMA-SMOOTHE/FS BODY) 0 01 % OIL Apply topically (Patient not taking: Reported on 9/16/2021)      Halobetasol Propionate 0 01 % LOTN halobetasol propionate 0 05 % topical ointment (Patient not taking: Reported on 9/16/2021)      promethazine-dextromethorphan (PHENERGAN-DM) 6 25-15 mg/5 mL oral syrup promethazine-DM 6 25 mg-15 mg/5 mL oral syrup (Patient not taking: Reported on 9/16/2021)      pyridoxine (VITAMIN B6) 50 mg tablet Take 1 tablet (50 mg total) by mouth daily (Patient not taking: Reported on 9/16/2021) 30 tablet 8     No current facility-administered medications for this visit  Allergies: Allergies   Allergen Reactions    Keflex [Cephalexin] Rash       Physical Exam:  /64 (BP Location: Left arm, Patient Position: Sitting, Cuff Size: Adult)   Pulse 63   Temp (!) 97 3 °F (36 3 °C)   Resp 18   Ht 5' 2" (1 575 m)   Wt 52 6 kg (116 lb)   SpO2 96%   BMI 21 22 kg/m²   Body surface area is 1 52 meters squared  Wt Readings from Last 3 Encounters:   03/22/22 52 6 kg (116 lb)   12/21/21 52 4 kg (115 lb 8 oz)   09/16/21 53 5 kg (118 lb)           Physical Exam  Constitutional:       General: She is not in acute distress  Appearance: Normal appearance  HENT:      Head: Normocephalic and atraumatic  Eyes:      General: No scleral icterus  Right eye: No discharge  Left eye: No discharge  Conjunctiva/sclera: Conjunctivae normal    Cardiovascular:      Rate and Rhythm: Normal rate and regular rhythm  Pulmonary:      Effort: Pulmonary effort is normal  No respiratory distress  Breath sounds: Normal breath sounds  Chest:   Breasts:      Right: No axillary adenopathy or supraclavicular adenopathy  Left: No axillary adenopathy or supraclavicular adenopathy  Abdominal:      General: Bowel sounds are normal  There is no distension  Palpations: Abdomen is soft  There is no mass  Tenderness: There is no abdominal tenderness  Musculoskeletal:         General: Normal range of motion  Lymphadenopathy:      Cervical: No cervical adenopathy  Upper Body:      Right upper body: No supraclavicular, axillary or pectoral adenopathy  Left upper body: No supraclavicular, axillary or pectoral adenopathy  Skin:     General: Skin is warm and dry  Neurological:      General: No focal deficit present  Mental Status: She is alert and oriented to person, place, and time  Psychiatric:         Mood and Affect: Mood normal          Behavior: Behavior normal          Assessment / Plan:    1  Essential thrombocytosis (HCC)    The patient is a very pleasant 55-year-old female   Myeloproliferative workup confirmed JAK2 positive essential thrombocytosis   She was started on Hydrea when her platelet count approached 800  This was back on 07/12/2020  She has been maintained on Hydrea 500 mg daily with a nice response  Her platelet count has now normalized and has remained stable over the past year  She is tolerating this dose without any side effects  Her most recent CBC demonstrates normal platelet count 607  Her mild macrocytosis is stable and likely secondary to her myeloproliferative disorder  No evidence of anemia    White count is normal   Differential shows no concerns  CMP is normal   Patient will continue on current dose of Hydrea without change  She will return for follow-up visit in 3 months with another CBC and CMP  Patient verbalized understanding and was in agreement with the plan of care    She was instructed to call with any questions or concerns prior to her next office visit    Goals and Barriers:  Current Goal:  Prolong Survival from ET  Barriers: None  Patient's Capacity to Self Care:  Patient is able to self care  Portions of the record may have been created with voice recognition software  Occasional wrong word or "sound a like" substitutions may have occurred due to the inherent limitations of voice recognition software  Read the chart carefully and recognize, using context, where substitutions have occurred

## 2022-03-22 ENCOUNTER — OFFICE VISIT (OUTPATIENT)
Dept: HEMATOLOGY ONCOLOGY | Facility: CLINIC | Age: 72
End: 2022-03-22
Payer: MEDICARE

## 2022-03-22 VITALS
RESPIRATION RATE: 18 BRPM | TEMPERATURE: 97.3 F | BODY MASS INDEX: 21.35 KG/M2 | WEIGHT: 116 LBS | SYSTOLIC BLOOD PRESSURE: 110 MMHG | HEART RATE: 63 BPM | DIASTOLIC BLOOD PRESSURE: 64 MMHG | OXYGEN SATURATION: 96 % | HEIGHT: 62 IN

## 2022-03-22 DIAGNOSIS — D47.3 ESSENTIAL THROMBOCYTOSIS (HCC): Primary | ICD-10-CM

## 2022-03-22 PROCEDURE — 99214 OFFICE O/P EST MOD 30 MIN: CPT | Performed by: NURSE PRACTITIONER

## 2022-03-22 RX ORDER — HYDROXYUREA 500 MG/1
500 CAPSULE ORAL DAILY
Qty: 90 CAPSULE | Refills: 3 | Status: SHIPPED | OUTPATIENT
Start: 2022-03-22

## 2022-06-14 ENCOUNTER — APPOINTMENT (OUTPATIENT)
Dept: LAB | Facility: HOSPITAL | Age: 72
End: 2022-06-14
Payer: MEDICARE

## 2022-06-14 DIAGNOSIS — D47.3 ESSENTIAL THROMBOCYTOSIS (HCC): ICD-10-CM

## 2022-06-14 LAB
ALBUMIN SERPL BCP-MCNC: 4.5 G/DL (ref 3.5–5)
ALP SERPL-CCNC: 39.9 U/L (ref 34–104)
ALT SERPL W P-5'-P-CCNC: 13 U/L (ref 7–52)
ANION GAP SERPL CALCULATED.3IONS-SCNC: 8 MMOL/L (ref 4–13)
AST SERPL W P-5'-P-CCNC: 17 U/L (ref 13–39)
BASOPHILS # BLD AUTO: 0.04 THOUSANDS/ΜL (ref 0–0.1)
BASOPHILS NFR BLD AUTO: 1 % (ref 0–1)
BILIRUB SERPL-MCNC: 0.59 MG/DL (ref 0.2–1)
BUN SERPL-MCNC: 20 MG/DL (ref 5–25)
CALCIUM SERPL-MCNC: 9.5 MG/DL (ref 8.4–10.2)
CHLORIDE SERPL-SCNC: 104 MMOL/L (ref 96–108)
CO2 SERPL-SCNC: 28 MMOL/L (ref 21–32)
CREAT SERPL-MCNC: 1.17 MG/DL (ref 0.6–1.3)
EOSINOPHIL # BLD AUTO: 0.17 THOUSAND/ΜL (ref 0–0.61)
EOSINOPHIL NFR BLD AUTO: 4 % (ref 0–6)
ERYTHROCYTE [DISTWIDTH] IN BLOOD BY AUTOMATED COUNT: 13.2 % (ref 11.6–15.1)
GFR SERPL CREATININE-BSD FRML MDRD: 46 ML/MIN/1.73SQ M
GLUCOSE P FAST SERPL-MCNC: 90 MG/DL (ref 65–99)
HCT VFR BLD AUTO: 35.2 % (ref 34.8–46.1)
HGB BLD-MCNC: 11.7 G/DL (ref 11.5–15.4)
IMM GRANULOCYTES # BLD AUTO: 0.02 THOUSAND/UL (ref 0–0.2)
IMM GRANULOCYTES NFR BLD AUTO: 1 % (ref 0–2)
LYMPHOCYTES # BLD AUTO: 1.02 THOUSANDS/ΜL (ref 0.6–4.47)
LYMPHOCYTES NFR BLD AUTO: 23 % (ref 14–44)
MCH RBC QN AUTO: 37.5 PG (ref 26.8–34.3)
MCHC RBC AUTO-ENTMCNC: 33.2 G/DL (ref 31.4–37.4)
MCV RBC AUTO: 113 FL (ref 82–98)
MONOCYTES # BLD AUTO: 0.38 THOUSAND/ΜL (ref 0.17–1.22)
MONOCYTES NFR BLD AUTO: 9 % (ref 4–12)
NEUTROPHILS # BLD AUTO: 2.79 THOUSANDS/ΜL (ref 1.85–7.62)
NEUTS SEG NFR BLD AUTO: 62 % (ref 43–75)
NRBC BLD AUTO-RTO: 0 /100 WBCS
PLATELET # BLD AUTO: 238 THOUSANDS/UL (ref 149–390)
PMV BLD AUTO: 8.8 FL (ref 8.9–12.7)
POTASSIUM SERPL-SCNC: 4 MMOL/L (ref 3.5–5.3)
PROT SERPL-MCNC: 6.9 G/DL (ref 6.4–8.4)
RBC # BLD AUTO: 3.12 MILLION/UL (ref 3.81–5.12)
SODIUM SERPL-SCNC: 140 MMOL/L (ref 135–147)
WBC # BLD AUTO: 4.42 THOUSAND/UL (ref 4.31–10.16)

## 2022-06-14 PROCEDURE — 80053 COMPREHEN METABOLIC PANEL: CPT

## 2022-06-14 PROCEDURE — 36415 COLL VENOUS BLD VENIPUNCTURE: CPT

## 2022-06-14 PROCEDURE — 85025 COMPLETE CBC W/AUTO DIFF WBC: CPT

## 2022-06-21 NOTE — PROGRESS NOTES
Hematology/Oncology Outpatient Follow- up Note  Jigna Urena, 1950, [id number]  2022        Chief Complaint   Patient presents with    Follow-up       HPI:  Jigna Urena is a 69 yo female who was seen for initial consultation on 2020 regarding thrombocytosis   Her thrombocytosis was significant with platelet count in the range of 700 on several occasions   She has an elevated platelet count dating back to 2020  The remainder of her cell lines are WNL  Given that she has had consistently elevated platelets over the past 7 months, a myeloproliferative workup was ordered   Results of this indicate a positive JAK2 mutation confirming a diagnosis of essential thrombocytosis    Previous Hematologic History:    Workup    Current Hematologic/ Oncologic Treatment:    Hydrea 500 mg daily started on 20    ECO - Asymptomatic    Interval History:   The patient presents for routine follow up  She was last seen on 3/22  She denies any change to her health since last visit  She continues to take Hydrea daily  Her Most recent blood work completed on  was reviewed  Her platelets remain in normal range at 238    White count, Hgb and differential are normal    CMP normal   Patient is feeling well  She had Covid in May and was treated with Paxlovid  She has had multiple Styes since having Covid requiring treatment by opthalmology   Otherwise, she denies any change to her health  Overall is feeling quite well  Test Results:    Imaging: No results found      Labs:   Lab Results   Component Value Date    WBC 4 42 2022    HGB 11 7 2022    HCT 35 2 2022     (H) 2022     2022     Lab Results   Component Value Date    K 4 0 2022     2022    CO2 28 2022    BUN 20 2022    CREATININE 1 17 2022    GLUF 90 2022    CALCIUM 9 5 2022    AST 17 2022    ALT 13 2022    ALKPHOS 39 9 06/14/2022    EGFR 46 06/14/2022           Review of Systems   All other systems reviewed and are negative  Active Problems:   Patient Active Problem List   Diagnosis    TB lung, latent    Psoriatic arthritis ([facility] [address] )    Essential thrombocytosis ([facility] [address] )       Past Medical History:   Past Medical History:   Diagnosis Date    Hyperlipidemia     Hypertension     Psoriasis        Surgical History:   Past Surgical History:   Procedure Laterality Date    APPENDECTOMY      MAMMO NEEDLE LOCALIZATION RIGHT (ALL INC) Right 7/13/2009       Family History:    Family History   Problem Relation Age of Onset    Tuberculosis Father     Tuberculosis Brother        Cancer-related family history is not on file      Social History:   Social History     Socioeconomic History    Marital status:      Spouse name: Not on file    Number of children: Not on file    Years of education: Not on file    Highest education level: Not on file   Occupational History    Not on file   Tobacco Use    Smoking status: Former Smoker    Smokeless tobacco: Never Used    Tobacco comment: Quit 2010   Substance and Sexual Activity    Alcohol use: Not on file    Drug use: Not on file    Sexual activity: Not on file   Other Topics Concern    Not on file   Social History Narrative    Not on file     Social Determinants of Health     Financial Resource Strain: Not on file   Food Insecurity: Not on file   Transportation Needs: Not on file   Physical Activity: Not on file   Stress: Not on file   Social Connections: Not on file   Intimate Partner Violence: Not on file   Housing Stability: Not on file       Current Medications:   Current Outpatient Medications   Medication Sig Dispense Refill    amLODIPine (NORVASC) 5 mg tablet 5 mg 2 (two) times a day       Apremilast 30 MG TABS 30 mg Every 12 hours       aspirin 81 MG tablet Take 81 mg by mouth daily       Cholecalciferol (VITAMIN D3) 5000 units TABS 5,000 Units Daily       diphenhydrAMINE (BENADRYL) 25 mg capsule Take 25 mg by mouth every 12 (twelve) hours as needed for itching       Fluocinolone Acetonide Scalp 0 01 % OIL       halobetasol (ULTRAVATE) 0 05 % ointment        hydrocortisone 2 5 % ointment       hydroxyurea (HYDREA) 500 mg capsule Take 1 capsule (500 mg total) by mouth daily 90 capsule 3    losartan (COZAAR) 50 mg tablet Take 50 mg by mouth daily       Probiotic Product (PROBIOTIC DAILY PO) Take 1 capsule by mouth daily      rosuvastatin (CRESTOR) 5 mg tablet Take 5 mg by mouth daily       calcipotriene (DOVONEX) 0 005 % cream calcipotriene 0 005 % topical cream   APPLY TWICE A DAY TO PSORIASIS MONDAY-FRIDAY (Patient not taking: No sig reported)      chlorhexidine (PERIDEX) 0 12 % solution  (Patient not taking: No sig reported)      Fluocinolone Acetonide Body 0 01 % OIL Apply topically (Patient not taking: No sig reported)      Halobetasol Propionate 0 01 % LOTN halobetasol propionate 0 05 % topical ointment (Patient not taking: No sig reported)      promethazine-dextromethorphan (PHENERGAN-DM) 6 25-15 mg/5 mL oral syrup promethazine-DM 6 25 mg-15 mg/5 mL oral syrup (Patient not taking: No sig reported)      pyridoxine (VITAMIN B6) 50 mg tablet Take 1 tablet (50 mg total) by mouth daily (Patient not taking: No sig reported) 30 tablet 8     No current facility-administered medications for this visit  Allergies: Allergies   Allergen Reactions    Neomycin-Polymyxin-Dexameth Eye Swelling    Keflex [Cephalexin] Rash       Physical Exam:  /62 (BP Location: Left arm, Patient Position: Sitting, Cuff Size: Standard)   Pulse 71   Temp 98 1 °F (36 7 °C) (Temporal)   Resp 16   Ht 5' 2" (1 575 m)   Wt 52 6 kg (116 lb)   SpO2 98%   BMI 21 22 kg/m²   Body surface area is 1 52 meters squared      Wt Readings from Last 3 Encounters:   06/23/22 52 6 kg (116 lb)   03/22/22 52 6 kg (116 lb)   12/21/21 52 4 kg (115 lb 8 oz)           Physical Exam  Constitutional:       General: She is not in acute distress  Appearance: Normal appearance  HENT:      Head: Normocephalic and atraumatic  Eyes:      General: No scleral icterus  Right eye: No discharge  Left eye: No discharge  Conjunctiva/sclera: Conjunctivae normal    Cardiovascular:      Rate and Rhythm: Normal rate and regular rhythm  Pulmonary:      Effort: Pulmonary effort is normal  No respiratory distress  Breath sounds: Normal breath sounds  Chest:   Breasts:      Right: No axillary adenopathy or supraclavicular adenopathy  Left: No axillary adenopathy or supraclavicular adenopathy  Abdominal:      General: Bowel sounds are normal  There is no distension  Palpations: Abdomen is soft  There is no mass  Tenderness: There is no abdominal tenderness  Musculoskeletal:         General: Normal range of motion  Lymphadenopathy:      Cervical: No cervical adenopathy  Upper Body:      Right upper body: No supraclavicular, axillary or pectoral adenopathy  Left upper body: No supraclavicular, axillary or pectoral adenopathy  Skin:     General: Skin is warm and dry  Neurological:      General: No focal deficit present  Mental Status: She is alert and oriented to person, place, and time  Psychiatric:         Mood and Affect: Mood normal          Behavior: Behavior normal          Assessment / Plan:    1  Essential thrombocytosis (HCC)    The patient is a very pleasant 80-year-old female   Myeloproliferative workup confirmed JAK2 positive essential thrombocytosis   She was started on Hydrea when her platelet count approached 800  This was back on 07/12/2020  She has been maintained on Hydrea 500 mg daily with a nice response  Her platelet count has now normalized and has remained stable over the past year  She is tolerating this dose without any side effects  Her most recent CBC demonstrates normal platelet count     Her mild macrocytosis is stable and likely secondary to her myeloproliferative disorder  No evidence of anemia  White count is normal   Differential shows no concerns  CMP is normal   Patient will continue on current dose of Hydrea without change  She will return for follow-up visit in 3 months with another CBC and CMP  Patient verbalized understanding and was in agreement with the plan of care    She was instructed to call with any questions or concerns prior to her next office visit    Goals and Barriers:  Current Goal:  Prolong Survival from ET  Barriers: None  Patient's Capacity to Self Care:  Patient is able to self care  Portions of the record may have been created with voice recognition software  Occasional wrong word or "sound a like" substitutions may have occurred due to the inherent limitations of voice recognition software  Read the chart carefully and recognize, using context, where substitutions have occurred

## 2022-06-23 ENCOUNTER — OFFICE VISIT (OUTPATIENT)
Dept: HEMATOLOGY ONCOLOGY | Facility: CLINIC | Age: 72
End: 2022-06-23
Payer: MEDICARE

## 2022-06-23 VITALS
TEMPERATURE: 98.1 F | BODY MASS INDEX: 21.35 KG/M2 | WEIGHT: 116 LBS | RESPIRATION RATE: 16 BRPM | SYSTOLIC BLOOD PRESSURE: 110 MMHG | DIASTOLIC BLOOD PRESSURE: 62 MMHG | HEART RATE: 71 BPM | OXYGEN SATURATION: 98 % | HEIGHT: 62 IN

## 2022-06-23 DIAGNOSIS — D47.3 ESSENTIAL THROMBOCYTOSIS (HCC): Primary | ICD-10-CM

## 2022-06-23 PROCEDURE — 99214 OFFICE O/P EST MOD 30 MIN: CPT | Performed by: NURSE PRACTITIONER

## 2022-07-08 ENCOUNTER — TELEPHONE (OUTPATIENT)
Dept: GASTROENTEROLOGY | Facility: CLINIC | Age: 72
End: 2022-07-08

## 2022-07-08 NOTE — TELEPHONE ENCOUNTER
Patients GI provider:  Dr Navin Astorga    Number to return call: [phone]    Reason for call: Pt called to schedule her colonoscopy       Scheduled procedure/appointment date if applicable: N/A

## 2022-07-11 NOTE — TELEPHONE ENCOUNTER
Pt is due for colon with Dr Ciara Butcher for hx of tubular adenoma polyps  I lmom for pt to please call back to schedule her colonoscopy with Dr Ciara Butcher  Will call pt again in one week if do not hear back from her

## 2022-07-12 ENCOUNTER — TELEPHONE (OUTPATIENT)
Dept: GASTROENTEROLOGY | Facility: CLINIC | Age: 72
End: 2022-07-12

## 2022-07-12 NOTE — TELEPHONE ENCOUNTER
Scheduled date of colonoscopy (as of today): 8/15/22  Physician performing colonoscopy: Dr Henley Covert  Location of colonoscopy: [facility]  Bowel prep reviewed with patient: miralax w/ mag  Instructions reviewed with patient by: ls  Clearances: n/a

## 2022-07-12 NOTE — TELEPHONE ENCOUNTER
[facility] Assessment    Name: Dorothy Aggarwal  YOB: 1950  Last Height: 5' 2" (1 575 m)  Last weight: 52 6 kg (116 lb)  BMI: 21 22 kg/m²  Procedure: Colon  Diagnosis: see order  Date of procedure: 8/15/22  Prep: miralax w/ mag  Responsible : yes  Phone#: [phone]  Name completing form: Mg Laurent  Date form completed: 07/12/22      If the patient answers yes to any of these questions, schedule in a hospital  Are you pregnant: No  Do you rely on a wheelchair for mobility: No  Have you been diagnosed with End Stage Renal Disease (ESRD): No  Do you need oxygen during the day: No  Have you had a heart attack or stroke within the past three months: No  Have you had a seizure within the past three months: No  Have you ever been informed by anesthesia that you have a difficult airway: No  Additional Questions  Have you had any cardiac testing or are under the care of a Cardiologist (see cardiac list): No  Cardiac list:   Do you have an implanted cardiac defibrillator: No (Comment:  This patient should be scheduled in the hospital)    Have any bleeding problems, such as anemia or hemophilia (If patient has H&H result below 8, schedule in hospital   H&H must be within 30 days of procedure): No    Had an organ transplant within the past 3 months: No    Do you have any present infections: No  Do you get short of breath when walking a few blocks: No  Have you been diagnosed with diabetes: No  Comments (provide cardiac provider information if applicable):

## 2022-08-01 ENCOUNTER — TELEPHONE (OUTPATIENT)
Dept: FAMILY MEDICINE CLINIC | Facility: CLINIC | Age: 72
End: 2022-08-01

## 2022-08-01 DIAGNOSIS — Z12.31 ENCOUNTER FOR SCREENING MAMMOGRAM FOR BREAST CANCER: Primary | ICD-10-CM

## 2022-08-01 NOTE — TELEPHONE ENCOUNTER
Kalyani Brown called and is requesting a order for a mammagram   She is scheduled for tomorrow at [facility]        Mammo order  [facility]

## 2022-08-02 ENCOUNTER — HOSPITAL ENCOUNTER (OUTPATIENT)
Dept: RADIOLOGY | Facility: HOSPITAL | Age: 72
Discharge: HOME | End: 2022-08-02
Payer: MEDICARE

## 2022-08-02 VITALS — WEIGHT: 116 LBS | BODY MASS INDEX: 21.35 KG/M2 | HEIGHT: 62 IN

## 2022-08-02 DIAGNOSIS — Z12.31 ENCOUNTER FOR SCREENING MAMMOGRAM FOR MALIGNANT NEOPLASM OF BREAST: ICD-10-CM

## 2022-08-02 PROCEDURE — 77063 BREAST TOMOSYNTHESIS BI: CPT

## 2022-08-02 PROCEDURE — 77067 SCR MAMMO BI INCL CAD: CPT

## 2022-08-08 ENCOUNTER — NURSE TRIAGE (OUTPATIENT)
Dept: OTHER | Facility: OTHER | Age: 72
End: 2022-08-08

## 2022-08-08 ENCOUNTER — TELEPHONE (OUTPATIENT)
Dept: FAMILY MEDICINE CLINIC | Facility: CLINIC | Age: 72
End: 2022-08-08

## 2022-08-08 NOTE — TELEPHONE ENCOUNTER
Patient advised to take Dulcolax tablets in place of Magnesium Citrate      Reason for Disposition   Colonoscopy, questions about    Protocols used: COLONOSCOPY SYMPTOMS AND QUESTIONS-ADULT-

## 2022-08-08 NOTE — TELEPHONE ENCOUNTER
Regarding: citrate issue   ----- Message from [facility] sent at 8/8/2022  8:22 AM EDT -----  "Magnesium citrate is out of stock for my colonoscopy"

## 2022-08-10 ENCOUNTER — APPOINTMENT (OUTPATIENT)
Dept: LAB | Facility: CLINIC | Age: 72
End: 2022-08-10
Payer: MEDICARE

## 2022-08-10 ENCOUNTER — TELEPHONE (OUTPATIENT)
Dept: GASTROENTEROLOGY | Facility: CLINIC | Age: 72
End: 2022-08-10

## 2022-08-10 DIAGNOSIS — E55.9 VITAMIN D DEFICIENCY: ICD-10-CM

## 2022-08-10 DIAGNOSIS — Z00.00 ROUTINE GENERAL MEDICAL EXAMINATION AT A HEALTH CARE FACILITY: ICD-10-CM

## 2022-08-10 DIAGNOSIS — E78.5 HYPERLIPIDEMIA, UNSPECIFIED HYPERLIPIDEMIA TYPE: ICD-10-CM

## 2022-08-10 DIAGNOSIS — I10 ESSENTIAL HYPERTENSION, MALIGNANT: ICD-10-CM

## 2022-08-10 DIAGNOSIS — Z79.899 ENCOUNTER FOR LONG-TERM (CURRENT) USE OF OTHER MEDICATIONS: ICD-10-CM

## 2022-08-10 PROBLEM — E78.2 MIXED HYPERLIPIDEMIA: Status: ACTIVE | Noted: 2022-08-10

## 2022-08-10 LAB
25(OH)D3 SERPL-MCNC: 43 NG/ML (ref 30–100)
ALBUMIN SERPL BCP-MCNC: 4.3 G/DL (ref 3.5–5)
ALP SERPL-CCNC: 33 U/L (ref 34–104)
ALT SERPL W P-5'-P-CCNC: 11 U/L (ref 7–52)
ANION GAP SERPL CALCULATED.3IONS-SCNC: 6 MMOL/L (ref 4–13)
AST SERPL W P-5'-P-CCNC: 17 U/L (ref 13–39)
BASOPHILS # BLD AUTO: 0.04 THOUSANDS/ΜL (ref 0–0.1)
BASOPHILS NFR BLD AUTO: 1 % (ref 0–1)
BILIRUB SERPL-MCNC: 0.5 MG/DL (ref 0.2–1)
BUN SERPL-MCNC: 19 MG/DL (ref 5–25)
CALCIUM SERPL-MCNC: 9.2 MG/DL (ref 8.4–10.2)
CHLORIDE SERPL-SCNC: 108 MMOL/L (ref 96–108)
CHOLEST SERPL-MCNC: 156 MG/DL
CO2 SERPL-SCNC: 27 MMOL/L (ref 21–32)
CREAT SERPL-MCNC: 0.94 MG/DL (ref 0.6–1.3)
EOSINOPHIL # BLD AUTO: 0.1 THOUSAND/ΜL (ref 0–0.61)
EOSINOPHIL NFR BLD AUTO: 3 % (ref 0–6)
ERYTHROCYTE [DISTWIDTH] IN BLOOD BY AUTOMATED COUNT: 13.2 % (ref 11.6–15.1)
GFR SERPL CREATININE-BSD FRML MDRD: 61 ML/MIN/1.73SQ M
GLUCOSE P FAST SERPL-MCNC: 91 MG/DL (ref 65–99)
HCT VFR BLD AUTO: 35.8 % (ref 34.8–46.1)
HDLC SERPL-MCNC: 78 MG/DL
HGB BLD-MCNC: 12 G/DL (ref 11.5–15.4)
IMM GRANULOCYTES # BLD AUTO: 0.01 THOUSAND/UL (ref 0–0.2)
IMM GRANULOCYTES NFR BLD AUTO: 0 % (ref 0–2)
LDLC SERPL CALC-MCNC: 61 MG/DL (ref 0–100)
LDLC SERPL DIRECT ASSAY-MCNC: 57 MG/DL (ref 0–100)
LYMPHOCYTES # BLD AUTO: 0.98 THOUSANDS/ΜL (ref 0.6–4.47)
LYMPHOCYTES NFR BLD AUTO: 28 % (ref 14–44)
MCH RBC QN AUTO: 37.6 PG (ref 26.8–34.3)
MCHC RBC AUTO-ENTMCNC: 33.5 G/DL (ref 31.4–37.4)
MCV RBC AUTO: 112 FL (ref 82–98)
MONOCYTES # BLD AUTO: 0.35 THOUSAND/ΜL (ref 0.17–1.22)
MONOCYTES NFR BLD AUTO: 10 % (ref 4–12)
NEUTROPHILS # BLD AUTO: 2.05 THOUSANDS/ΜL (ref 1.85–7.62)
NEUTS SEG NFR BLD AUTO: 58 % (ref 43–75)
NONHDLC SERPL-MCNC: 78 MG/DL
NRBC BLD AUTO-RTO: 0 /100 WBCS
PLATELET # BLD AUTO: 277 THOUSANDS/UL (ref 149–390)
PMV BLD AUTO: 8.9 FL (ref 8.9–12.7)
POTASSIUM SERPL-SCNC: 4 MMOL/L (ref 3.5–5.3)
PROT SERPL-MCNC: 6.4 G/DL (ref 6.4–8.4)
RBC # BLD AUTO: 3.19 MILLION/UL (ref 3.81–5.12)
SODIUM SERPL-SCNC: 141 MMOL/L (ref 135–147)
TRIGL SERPL-MCNC: 83 MG/DL
WBC # BLD AUTO: 3.53 THOUSAND/UL (ref 4.31–10.16)

## 2022-08-10 PROCEDURE — 80053 COMPREHEN METABOLIC PANEL: CPT

## 2022-08-10 PROCEDURE — 83721 ASSAY OF BLOOD LIPOPROTEIN: CPT

## 2022-08-10 PROCEDURE — 80061 LIPID PANEL: CPT

## 2022-08-10 PROCEDURE — 82306 VITAMIN D 25 HYDROXY: CPT

## 2022-08-10 PROCEDURE — 85025 COMPLETE CBC W/AUTO DIFF WBC: CPT

## 2022-08-10 PROCEDURE — 36415 COLL VENOUS BLD VENIPUNCTURE: CPT

## 2022-08-10 NOTE — TELEPHONE ENCOUNTER
Spoke to pt confirming pt's colonoscopy scheduled with Dr Dia Velasquez at [facility] on 8/15/22  Informed pt would be called 1-2 days prior with arrival time by [facility]  Informed would need to do a clear liquid diet the day prior as well as the bowel cleansing preparation  Informed pt would need a  the day of the procedure due to being under sedation  Pt did not have any questions  Advised pt to contact  insurance if has any questions regarding the coverage of procedure

## 2022-08-11 ENCOUNTER — OFFICE VISIT (OUTPATIENT)
Dept: FAMILY MEDICINE CLINIC | Facility: CLINIC | Age: 72
End: 2022-08-11
Payer: MEDICARE

## 2022-08-11 VITALS
SYSTOLIC BLOOD PRESSURE: 106 MMHG | TEMPERATURE: 98 F | HEART RATE: 86 BPM | DIASTOLIC BLOOD PRESSURE: 60 MMHG | BODY MASS INDEX: 21.9 KG/M2 | WEIGHT: 119 LBS | RESPIRATION RATE: 18 BRPM | HEIGHT: 62 IN

## 2022-08-11 DIAGNOSIS — Z00.00 MEDICARE ANNUAL WELLNESS VISIT, SUBSEQUENT: Primary | ICD-10-CM

## 2022-08-11 DIAGNOSIS — D47.3 ESSENTIAL THROMBOCYTOSIS (HCC): ICD-10-CM

## 2022-08-11 DIAGNOSIS — I10 HTN (HYPERTENSION), BENIGN: ICD-10-CM

## 2022-08-11 DIAGNOSIS — E78.2 MIXED HYPERLIPIDEMIA: ICD-10-CM

## 2022-08-11 DIAGNOSIS — L40.50 PSORIATIC ARTHRITIS (HCC): ICD-10-CM

## 2022-08-11 PROCEDURE — G0438 PPPS, INITIAL VISIT: HCPCS

## 2022-08-11 PROCEDURE — 99214 OFFICE O/P EST MOD 30 MIN: CPT

## 2022-08-11 NOTE — ASSESSMENT & PLAN NOTE
Under control  Continue current medication  We will re-evaluate at next office visit    Been followed by hematologist

## 2022-08-11 NOTE — PROGRESS NOTES
Answers for HPI/ROS submitted by the patient on 8/10/2022  How often during the last year have you found that you were not able to stop drinking once you had started?: 0 - never  How often during the last year have you failed to do what was normally expected from you because of drinking?: 0 - never  How often during the last year have you needed a first drink in the morning to get yourself going after a heavy drinking session?: 0 - never  How often during the last year have you had a feeling of guilt or remorse after drinking?: 0 - never  How often during the last year have you been unable to remember what happened the night before because you had been drinking?: 0 - never  Have you or someone else been injured as a result of your drinking?: 0 - no  Has a relative or friend or a doctor or another health worker been concerned about your drinking or suggested you cut down?: 0 - no  How would you rate your overall health?: good  Compared to last year, how is your physical health?: same  In general, how satisfied are you with your life?: satisfied  Compared to last year, how is your eyesight?: slightly worse  Compared to last year, how is your hearing?: slightly worse  Compared to last year, how is your emotional/mental health?: same  How often is anger a problem for you?: sometimes  How often do you feel unusually tired/fatigued?: sometimes  In the past 7 days, how much pain have you experienced?: none  In the past 6 months, have you lost or gained 10 pounds without trying?: No  One or more falls in the last year: No  In the past 6 months, have you accidentally leaked urine?: No  Do you have trouble with the stairs inside or outside your home?: No  Does your home have working smoke alarms?: Yes  Does your home have a carbon monoxide monitor?: Yes  Which safety hazards (if any) have you experienced in your home? Please select all that apply : none  How would you describe your current diet? Please select all that apply  Velia Buerger Regular  In addition to prescription medications, are you taking any over-the-counter supplements?: Yes  If yes, what supplements are you taking?: Probiotic , Vitamin D3 2000  Can you manage your medications?: Yes  Are you currently taking any opioid medications?: No  Can you walk and transfer into and out of your bed and chair?: Yes  Can you dress and groom yourself?: Yes  Can you bathe or shower yourself?: Yes  Can you feed yourself?: Yes  Can you do your laundry/ housekeeping?: Yes  Can you manage your money, pay your bills, and track your expenses?: Yes  Can you make your own meals?: Yes  Can you do your own shopping?: Yes  Within the last 12 months, have you had any hospitalizations or Emergency Department visits?: No  Do you have a living will?: No  Do you have a Durable POA (Power of ) for healthcare decisions?: Yes  Do you have an Advanced Directive for end of life decisions?: Yes  How often have you used an illegal drug (including marijuana) or a prescription medication for non-medical reasons in the past year?: never  What is the typical number of drinks you consume in a day?: 1  What is the typical number of drinks you consume in a week?: 5  How often did you have a drink containing alcohol in the past year?: 2 to 3 times a week  How many drinks did you have on a typical day  when you were drinking in the past year?: 1 to 2  How often did you have 6 or more drinks on one occasion in the past year?: never       Assessment and Plan:     Problem List Items Addressed This Visit        Cardiovascular and Mediastinum    HTN (hypertension), benign     Under control  Continue current medication  We will re-evaluate at next office visit  Musculoskeletal and Integument    Psoriatic arthritis ([facility] [address] )     Under control  Continue current medication  We will re-evaluate at next office visit  Hematopoietic and Hemostatic    Essential thrombocytosis (HCC)     Under control  Continue current medication  We will re-evaluate at next office visit  Been followed by hematologist            Other    Mixed hyperlipidemia     Under control  Continue current medication  We will re-evaluate at next office visit  Other Visit Diagnoses     Medicare annual wellness visit, subsequent    -  Primary           Preventive health issues were discussed with patient, and age appropriate screening tests were ordered as noted in patient's After Visit Summary  Personalized health advice and appropriate referrals for health education or preventive services given if needed, as noted in patient's After Visit Summary  History of Present Illness:     Patient presents for a Medicare Wellness Visit    Patient here for review of chronic medical problems and review of the labs and imaging if it is applicable  Currently has no specific complaints other than mentioned in the review of systems  Denies chest pain, SOB, cough, abdominal pain, nausea, vomiting, fever, chills, lightheadedness, dizziness,headache, tingling or numbness  No bowel or bladder problem  Patient Care Team:  Mary Soler MD as PCP - General (Internal Medicine)     Review of Systems:     Review of Systems   Constitutional: Negative for chills, fatigue and fever  HENT: Negative for congestion, ear pain, rhinorrhea, sneezing and sore throat  Eyes: Negative for pain, redness and visual disturbance  Respiratory: Negative for cough, chest tightness and shortness of breath  Cardiovascular: Negative for chest pain, palpitations and leg swelling  Gastrointestinal: Negative for abdominal pain, blood in stool, diarrhea, nausea and vomiting  Endocrine: Negative for cold intolerance and heat intolerance  Genitourinary: Negative for dysuria, frequency and hematuria  Musculoskeletal: Negative for arthralgias and back pain  Skin: Negative for color change and rash     Neurological: Negative for dizziness, weakness, light-headedness, numbness and headaches  Hematological: Does not bruise/bleed easily  Psychiatric/Behavioral: Negative for behavioral problems and confusion  Problem List:     Patient Active Problem List   Diagnosis    TB lung, latent    Psoriatic arthritis ([facility] [address] )    Essential thrombocytosis ([facility] [address] )    HTN (hypertension), benign    Mixed hyperlipidemia      Past Medical and Surgical History:     Past Medical History:   Diagnosis Date    Hyperlipidemia     Hypertension     Nodular goiter     Psoriasis      Past Surgical History:   Procedure Laterality Date    APPENDECTOMY      COLONOSCOPY  10/31/2018    DXA PROCEDURE (HISTORICAL)  04/21/2017    MAMMO (HISTORICAL)      8-24-18    MAMMO NEEDLE LOCALIZATION RIGHT (ALL INC) Right 07/13/2009    SKIN LESION EXCISION      bridge of nose      Family History:     Family History   Problem Relation Age of Onset    Stroke Mother     Tuberculosis Father     Cancer Brother         lung    Tuberculosis Brother     Coronary artery disease Brother     No Known Problems Daughter     No Known Problems Daughter     No Known Problems Maternal Grandmother     No Known Problems Maternal Grandfather     No Known Problems Paternal Grandmother     No Known Problems Paternal Grandfather     No Known Problems Maternal Aunt     No Known Problems Maternal Aunt     No Known Problems Maternal Aunt     No Known Problems Maternal Aunt     No Known Problems Paternal Aunt       Social History:     Social History     Socioeconomic History    Marital status:      Spouse name: None    Number of children: None    Years of education: None    Highest education level: None   Occupational History    None   Tobacco Use    Smoking status: Former Smoker    Smokeless tobacco: Never Used    Tobacco comment: Quit 2010   Substance and Sexual Activity    Alcohol use:  Yes     Alcohol/week: 1 0 standard drink     Types: 1 Glasses of wine per week    Drug use: Never    Sexual activity: None   Other Topics Concern    None   Social History Narrative    None     Social Determinants of Health     Financial Resource Strain: Not on file   Food Insecurity: Not on file   Transportation Needs: Not on file   Physical Activity: Not on file   Stress: Not on file   Social Connections: Not on file   Intimate Partner Violence: Not on file   Housing Stability: Not on file      Medications and Allergies:     Current Outpatient Medications   Medication Sig Dispense Refill    amLODIPine (NORVASC) 5 mg tablet 5 mg 2 (two) times a day       Apremilast 30 MG TABS 30 mg Every 12 hours       aspirin 81 MG tablet Take 81 mg by mouth daily       Cholecalciferol (VITAMIN D3) 5000 units TABS 5,000 Units Daily       diphenhydrAMINE (BENADRYL) 25 mg capsule Take 25 mg by mouth every 12 (twelve) hours as needed for itching       Fluocinolone Acetonide Scalp 0 01 % OIL       halobetasol (ULTRAVATE) 0 05 % ointment        hydrocortisone 2 5 % ointment       hydroxyurea (HYDREA) 500 mg capsule Take 1 capsule (500 mg total) by mouth daily 90 capsule 3    losartan (COZAAR) 50 mg tablet Take 50 mg by mouth daily       Probiotic Product (PROBIOTIC DAILY PO) Take 1 capsule by mouth daily      rosuvastatin (CRESTOR) 5 mg tablet Take 5 mg by mouth daily        No current facility-administered medications for this visit       Allergies   Allergen Reactions    Keflex [Cephalexin] Rash    Neomycin-Polymyxin-Dexameth Eye Swelling      Immunizations:     Immunization History   Administered Date(s) Administered    COVID-19 PFIZER VACCINE 0 3 ML IM 02/17/2021, 03/09/2021, 11/27/2021    Pneumococcal Conjugate 13-Valent 01/16/2017    Pneumococcal Polysaccharide PPV23 01/18/2018      Health Maintenance:         Topic Date Due    Hepatitis C Screening  Never done    Colorectal Cancer Screening  Never done    Breast Cancer Screening: Mammogram  08/02/2023         Topic Date Due    Influenza Vaccine (1) 09/01/2022      Medicare Screening Tests and Risk Assessments:         Health Risk Assessment:   Patient rates overall health as good  Patient feels that their physical health rating is same  Patient is satisfied with their life  Eyesight was rated as slightly worse  Hearing was rated as slightly worse  Patient feels that their emotional and mental health rating is same  Patients states they are sometimes angry  Patient states they are sometimes unusually tired/fatigued  Pain experienced in the last 7 days has been none  Patient states that she has experienced no weight loss or gain in last 6 months  Depression Screening:   PHQ-2 Score: 0      Fall Risk Screening: In the past year, patient has experienced: no history of falling in past year      Urinary Incontinence Screening:   Patient has not leaked urine accidently in the last six months  Home Safety:  Patient does not have trouble with stairs inside or outside of their home  Patient has working smoke alarms and has working carbon monoxide detector  Home safety hazards include: none  Nutrition:   Current diet is Regular  Medications:   Patient is currently taking over-the-counter supplements  OTC medications include: Probiotic , Vitamin D3 2000  Patient is able to manage medications  Activities of Daily Living (ADLs)/Instrumental Activities of Daily Living (IADLs):   Walk and transfer into and out of bed and chair?: Yes  Dress and groom yourself?: Yes    Bathe or shower yourself?: Yes    Feed yourself? Yes  Do your laundry/housekeeping?: Yes  Manage your money, pay your bills and track your expenses?: Yes  Make your own meals?: Yes    Do your own shopping?: Yes    Previous Hospitalizations:   Any hospitalizations or ED visits within the last 12 months?: No      Advance Care Planning:   Living will: No    Durable POA for healthcare:  Yes    Advanced directive: Yes      PREVENTIVE SCREENINGS      Cardiovascular Screening: General: Screening Not Indicated and History Lipid Disorder      Diabetes Screening:     General: Screening Current      Breast Cancer Screening:     General: Screening Current      Cervical Cancer Screening:    General: Screening Not Indicated      Lung Cancer Screening:     General: Screening Not Indicated    Screening, Brief Intervention, and Referral to Treatment (SBIRT)    Screening  Typical number of drinks in a day: 1  Typical number of drinks in a week: 5  Interpretation: Low risk drinking behavior  AUDIT-C Screenin) How often did you have a drink containing alcohol in the past year? 2 to 3 times a week  2) How many drinks did you have on a typical day when you were drinking in the past year? 1 to 2  3) How often did you have 6 or more drinks on one occasion in the past year? never    AUDIT-C Score: 3  Interpretation: Score 3-12 (female): POSITIVE screen for alcohol misuse    AUDIT Screenin) How often during the last year have you found that you were not able to stop drinking once you had started? 0 - never  5) How often during the last year have you failed to do what was normally expected from you because of drinking? 0 - never  6) How often during the last year have you needed a first drink in the morning to get yourself going after a heavy drinking session?  0 - never  7) How often during the last year have you had a feeling of guilt or remorse after drinking? 0 - never  8) How often during the last year have you been unable to remember what happened the night before because you had been drinking? 0 - never  9) Have you or someone else been injured as a result of your drinking? 0 - no  10) Has a relative or friend or a doctor or another health worker been concerned about your drinking or suggested you cut down? 0 - no    AUDIT Score: 3  Interpretation: Low risk alcohol consumption    Single Item Drug Screening:  How often have you used an illegal drug (including marijuana) or a prescription medication for non-medical reasons in the past year? never    Single Item Drug Screen Score: 0  Interpretation: Negative screen for possible drug use disorder    No exam data present     Physical Exam:     /60 (BP Location: Right arm, Patient Position: Sitting, Cuff Size: Standard)   Pulse 86   Temp 98 °F (36 7 °C) (Temporal)   Resp 18   Ht 5' 2" (1 575 m)   Wt 54 kg (119 lb)   BMI 21 77 kg/m²     Physical Exam  Vitals and nursing note reviewed  Constitutional:       General: She is not in acute distress  Appearance: Normal appearance  She is well-developed  HENT:      Head: Normocephalic and atraumatic  Right Ear: External ear normal  There is no impacted cerumen  Left Ear: External ear normal  There is no impacted cerumen  Nose: Nose normal       Mouth/Throat:      Mouth: Mucous membranes are moist       Pharynx: Oropharynx is clear  Eyes:      Conjunctiva/sclera: Conjunctivae normal    Cardiovascular:      Rate and Rhythm: Normal rate and regular rhythm  Pulses: Normal pulses  Heart sounds: Normal heart sounds  No murmur heard  Pulmonary:      Effort: Pulmonary effort is normal  No respiratory distress  Breath sounds: Normal breath sounds  No wheezing  Abdominal:      General: Abdomen is flat  Bowel sounds are normal       Palpations: Abdomen is soft  Tenderness: There is no abdominal tenderness  Musculoskeletal:         General: No tenderness  Normal range of motion  Cervical back: Normal range of motion and neck supple  No rigidity  Lymphadenopathy:      Cervical: No cervical adenopathy  Skin:     General: Skin is warm and dry  Capillary Refill: Capillary refill takes 2 to 3 seconds  Neurological:      General: No focal deficit present  Mental Status: She is alert and oriented to person, place, and time  Mental status is at baseline        Gait: Gait normal    Psychiatric:         Mood and Affect: Mood normal          Behavior: Behavior normal           Anaid Weldon MD

## 2022-08-11 NOTE — PATIENT INSTRUCTIONS
Medicare Preventive Visit Patient Instructions  Thank you for completing your Welcome to Medicare Visit or Medicare Annual Wellness Visit today  Your next wellness visit will be due in one year (8/12/2023)  The screening/preventive services that you may require over the next 5-10 years are detailed below  Some tests may not apply to you based off risk factors and/or age  Screening tests ordered at today's visit but not completed yet may show as past due  Also, please note that scanned in results may not display below  Preventive Screenings:  Service Recommendations Previous Testing/Comments   Colorectal Cancer Screening  * Colonoscopy    * Fecal Occult Blood Test (FOBT)/Fecal Immunochemical Test (FIT)  * Fecal DNA/Cologuard Test  * Flexible Sigmoidoscopy Age: 39-70 years old   Colonoscopy: every 10 years (may be performed more frequently if at higher risk)  OR  FOBT/FIT: every 1 year  OR  Cologuard: every 3 years  OR  Sigmoidoscopy: every 5 years  Screening may be recommended earlier than age 39 if at higher risk for colorectal cancer  Also, an individualized decision between you and your healthcare provider will decide whether screening between the ages of 74-80 would be appropriate  Colonoscopy: Not on file  FOBT/FIT: Not on file  Cologuard: Not on file  Sigmoidoscopy: Not on file          Breast Cancer Screening Age: 36 years old  Frequency: every 1-2 years  Not required if history of left and right mastectomy Mammogram: 08/02/2022        Cervical Cancer Screening Between the ages of 21-29, pap smear recommended once every 3 years  Between the ages of 33-67, can perform pap smear with HPV co-testing every 5 years     Recommendations may differ for women with a history of total hysterectomy, cervical cancer, or abnormal pap smears in past  Pap Smear: Not on file        Hepatitis C Screening Once for adults born between [facility]  More frequently in patients at high risk for Hepatitis C Hep C Antibody: Not on file        Diabetes Screening 1-2 times per year if you're at risk for diabetes or have pre-diabetes Fasting glucose: 91 mg/dL (8/10/2022)  A1C: No results in last 5 years (No results in last 5 years)      Cholesterol Screening Once every 5 years if you don't have a lipid disorder  May order more often based on risk factors  Lipid panel: 08/10/2022          Other Preventive Screenings Covered by Medicare:  1  Abdominal Aortic Aneurysm (AAA) Screening: covered once if your at risk  You're considered to be at risk if you have a family history of AAA  2  Lung Cancer Screening: covers low dose CT scan once per year if you meet all of the following conditions: (1) Age 50-69; (2) No signs or symptoms of lung cancer; (3) Current smoker or have quit smoking within the last 15 years; (4) You have a tobacco smoking history of at least 20 pack years (packs per day multiplied by number of years you smoked); (5) You get a written order from a healthcare provider  3  Glaucoma Screening: covered annually if you're considered high risk: (1) You have diabetes OR (2) Family history of glaucoma OR (3)  aged 48 and older OR (3)  American aged 72 and older  3  Osteoporosis Screening: covered every 2 years if you meet one of the following conditions: (1) You're estrogen deficient and at risk for osteoporosis based off medical history and other findings; (2) Have a vertebral abnormality; (3) On glucocorticoid therapy for more than 3 months; (4) Have primary hyperparathyroidism; (5) On osteoporosis medications and need to assess response to drug therapy  · Last bone density test (DXA Scan): Not on file  5  HIV Screening: covered annually if you're between the age of 12-76  Also covered annually if you are younger than 13 and older than 72 with risk factors for HIV infection  For pregnant patients, it is covered up to 3 times per pregnancy      Immunizations:  Immunization Recommendations   Influenza Vaccine Annual influenza vaccination during flu season is recommended for all persons aged >= 6 months who do not have contraindications   Pneumococcal Vaccine   * Pneumococcal conjugate vaccine = PCV13 (Prevnar 13), PCV15 (Vaxneuvance), PCV20 (Prevnar 20)  * Pneumococcal polysaccharide vaccine = PPSV23 (Pneumovax) Adults 25-60 years old: 1-3 doses may be recommended based on certain risk factors  Adults 72 years old: 1-2 doses may be recommended based off what pneumonia vaccine you previously received   Hepatitis B Vaccine 3 dose series if at intermediate or high risk (ex: diabetes, end stage renal disease, liver disease)   Tetanus (Td) Vaccine - COST NOT COVERED BY MEDICARE PART B Following completion of primary series, a booster dose should be given every 10 years to maintain immunity against tetanus  Td may also be given as tetanus wound prophylaxis  Tdap Vaccine - COST NOT COVERED BY MEDICARE PART B Recommended at least once for all adults  For pregnant patients, recommended with each pregnancy  Shingles Vaccine (Shingrix) - COST NOT COVERED BY MEDICARE PART B  2 shot series recommended in those aged 48 and above     Health Maintenance Due:      Topic Date Due    Hepatitis C Screening  Never done    Colorectal Cancer Screening  Never done    Breast Cancer Screening: Mammogram  08/02/2023     Immunizations Due:      Topic Date Due    COVID-19 Vaccine (4 - Booster for Pfizer series) 03/27/2022    Influenza Vaccine (1) 09/01/2022     Advance Directives   What are advance directives? Advance directives are legal documents that state your wishes and plans for medical care  These plans are made ahead of time in case you lose your ability to make decisions for yourself  Advance directives can apply to any medical decision, such as the treatments you want, and if you want to donate organs  What are the types of advance directives?   There are many types of advance directives, and each state has rules about how to use them  You may choose a combination of any of the following:  · Living will: This is a written record of the treatment you want  You can also choose which treatments you do not want, which to limit, and which to stop at a certain time  This includes surgery, medicine, IV fluid, and tube feedings  · Durable power of  for healthcare [facility]): This is a written record that states who you want to make healthcare choices for you when you are unable to make them for yourself  This person, called a proxy, is usually a family member or a friend  You may choose more than 1 proxy  · Do not resuscitate (DNR) order:  A DNR order is used in case your heart stops beating or you stop breathing  It is a request not to have certain forms of treatment, such as CPR  A DNR order may be included in other types of advance directives  · Medical directive: This covers the care that you want if you are in a coma, near death, or unable to make decisions for yourself  You can list the treatments you want for each condition  Treatment may include pain medicine, surgery, blood transfusions, dialysis, IV or tube feedings, and a ventilator (breathing machine)  · Values history: This document has questions about your views, beliefs, and how you feel and think about life  This information can help others choose the care that you would choose  Why are advance directives important? An advance directive helps you control your care  Although spoken wishes may be used, it is better to have your wishes written down  Spoken wishes can be misunderstood, or not followed  Treatments may be given even if you do not want them  An advance directive may make it easier for your family to make difficult choices about your care  Urinary Incontinence   Urinary incontinence (UI)  is when you lose control of your bladder  UI develops because your bladder cannot store or empty urine properly   The 3 most common types of UI are stress incontinence, urge incontinence, or both  Medicines:   · May be given to help strengthen your bladder control  Report any side effects of medication to your healthcare provider  Do pelvic muscle exercises often:  Your pelvic muscles help you stop urinating  Squeeze these muscles tight for 5 seconds, then relax for 5 seconds  Gradually work up to squeezing for 10 seconds  Do 3 sets of 15 repetitions a day, or as directed  This will help strengthen your pelvic muscles and improve bladder control  Train your bladder:  Go to the bathroom at set times, such as every 2 hours, even if you do not feel the urge to go  You can also try to hold your urine when you feel the urge to go  For example, hold your urine for 5 minutes when you feel the urge to go  As that becomes easier, hold your urine for 10 minutes  Self-care:   · Keep a UI record  Write down how often you leak urine and how much you leak  Make a note of what you were doing when you leaked urine  · Drink liquids as directed  You may need to limit the amount of liquid you drink to help control your urine leakage  Do not drink any liquid right before you go to bed  Limit or do not have drinks that contain caffeine or alcohol  · Prevent constipation  Eat a variety of high-fiber foods  Good examples are high-fiber cereals, beans, vegetables, and whole-grain breads  Walking is the best way to trigger your intestines to have a bowel movement  · Exercise regularly and maintain a healthy weight  Weight loss and exercise will decrease pressure on your bladder and help you control your leakage  · Use a catheter as directed  to help empty your bladder  A catheter is a tiny, plastic tube that is put into your bladder to drain your urine  · Go to behavior therapy as directed  Behavior therapy may be used to help you learn to control your urge to urinate         © Copyright [employer] 2018 Information is for End User's use only and may not be sold, redistributed or otherwise used for commercial purposes   All illustrations and images included in CareNotes® are the copyrighted property of A D A M , Inc  or [facility]

## 2022-08-15 ENCOUNTER — ANESTHESIA (OUTPATIENT)
Dept: GASTROENTEROLOGY | Facility: AMBULATORY SURGERY CENTER | Age: 72
End: 2022-08-15

## 2022-08-15 ENCOUNTER — HOSPITAL ENCOUNTER (OUTPATIENT)
Dept: GASTROENTEROLOGY | Facility: AMBULATORY SURGERY CENTER | Age: 72
Discharge: HOME | End: 2022-08-15
Payer: MEDICARE

## 2022-08-15 ENCOUNTER — ANESTHESIA EVENT (OUTPATIENT)
Dept: GASTROENTEROLOGY | Facility: AMBULATORY SURGERY CENTER | Age: 72
End: 2022-08-15

## 2022-08-15 VITALS
HEIGHT: 62 IN | BODY MASS INDEX: 21.71 KG/M2 | WEIGHT: 118 LBS | OXYGEN SATURATION: 97 % | DIASTOLIC BLOOD PRESSURE: 69 MMHG | TEMPERATURE: 96.7 F | SYSTOLIC BLOOD PRESSURE: 100 MMHG | HEART RATE: 55 BPM | RESPIRATION RATE: 18 BRPM

## 2022-08-15 DIAGNOSIS — Z86.010 HX OF ADENOMATOUS COLONIC POLYPS: ICD-10-CM

## 2022-08-15 PROCEDURE — G0121 COLON CA SCRN NOT HI RSK IND: HCPCS | Performed by: INTERNAL MEDICINE

## 2022-08-15 RX ORDER — SODIUM CHLORIDE, SODIUM LACTATE, POTASSIUM CHLORIDE, CALCIUM CHLORIDE 600; 310; 30; 20 MG/100ML; MG/100ML; MG/100ML; MG/100ML
INJECTION, SOLUTION INTRAVENOUS CONTINUOUS PRN
Status: DISCONTINUED | OUTPATIENT
Start: 2022-08-15 | End: 2022-08-15

## 2022-08-15 RX ORDER — PROPOFOL 10 MG/ML
INJECTION, EMULSION INTRAVENOUS AS NEEDED
Status: DISCONTINUED | OUTPATIENT
Start: 2022-08-15 | End: 2022-08-15

## 2022-08-15 RX ADMIN — PROPOFOL 30 MG: 10 INJECTION, EMULSION INTRAVENOUS at 10:01

## 2022-08-15 RX ADMIN — PROPOFOL 20 MG: 10 INJECTION, EMULSION INTRAVENOUS at 10:12

## 2022-08-15 RX ADMIN — PROPOFOL 20 MG: 10 INJECTION, EMULSION INTRAVENOUS at 10:08

## 2022-08-15 RX ADMIN — SODIUM CHLORIDE, SODIUM LACTATE, POTASSIUM CHLORIDE, CALCIUM CHLORIDE: 600; 310; 30; 20 INJECTION, SOLUTION INTRAVENOUS at 09:47

## 2022-08-15 RX ADMIN — PROPOFOL 30 MG: 10 INJECTION, EMULSION INTRAVENOUS at 10:15

## 2022-08-15 RX ADMIN — PROPOFOL 30 MG: 10 INJECTION, EMULSION INTRAVENOUS at 10:10

## 2022-08-15 RX ADMIN — PROPOFOL 100 MG: 10 INJECTION, EMULSION INTRAVENOUS at 10:00

## 2022-08-15 RX ADMIN — PROPOFOL 20 MG: 10 INJECTION, EMULSION INTRAVENOUS at 10:03

## 2022-08-15 RX ADMIN — PROPOFOL 30 MG: 10 INJECTION, EMULSION INTRAVENOUS at 10:05

## 2022-08-15 NOTE — DISCHARGE SUMMARY
Discharge Summary - Miriam Watkins 70 y o  female MRN: [id number]    Unit/Bed#:  Encounter: [id number]    Admission Date:  08/15/2010    Admitting Diagnosis: Hx of adenomatous colonic polyps [Z86 010]    HPI:  History of colon polyp    Procedures Performed: No orders of the defined types were placed in this encounter  Summary of Hospital Course: Tolerated procedure    Significant Findings, Care, Treatment and Services Provided:  Diverticulosis noted  Recurrent polyp not seen  Complications:  None    Discharge Diagnosis:  See above    Medical Problems             Resolved Problems  Date Reviewed: 8/11/2022   None                 Condition at Discharge: good         Discharge instructions/Information to patient and family:   See after visit summary for information provided to patient and family  Provisions for Follow-Up Care:  See after visit summary for information related to follow-up care and any pertinent home health orders        PCP: Hiram Lord MD    Disposition: Home

## 2022-08-15 NOTE — H&P
History and Physical - [facility]  Adelina Goldmann 70 y o  female MRN: [id number]                  HPI: Adelina Goldmann is a 70y o  year old female who presents for colonoscopy  History of adenomatous colon polyp  Last colonoscopy four years ago  REVIEW OF SYSTEMS: Per the HPI, and otherwise unremarkable      Historical Information   Past Medical History:   Diagnosis Date    Essential thrombocytosis ([address] )     controlled with medication    Hyperlipidemia     Hypertension     Nodular goiter     Psoriasis      Past Surgical History:   Procedure Laterality Date    APPENDECTOMY      COLONOSCOPY  10/31/2018    DXA PROCEDURE (HISTORICAL)  04/21/2017    MAMMO (HISTORICAL)      8-24-18    MAMMO NEEDLE LOCALIZATION RIGHT (ALL INC) Right 07/13/2009    SKIN LESION EXCISION      bridge of nose     Social History   Social History     Substance and Sexual Activity   Alcohol Use Yes    Alcohol/week: 7 0 standard drinks    Types: 7 Glasses of wine per week     Social History     Substance and Sexual Activity   Drug Use Never     Social History     Tobacco Use   Smoking Status Former Smoker   Smokeless Tobacco Never Used   Tobacco Comment    Quit 2010     Family History   Problem Relation Age of Onset    Stroke Mother     Tuberculosis Father     Cancer Brother         lung    Tuberculosis Brother     Coronary artery disease Brother     No Known Problems Daughter     No Known Problems Daughter     No Known Problems Maternal Grandmother     No Known Problems Maternal Grandfather     No Known Problems Paternal Grandmother     No Known Problems Paternal Grandfather     No Known Problems Maternal Aunt     No Known Problems Maternal Aunt     No Known Problems Maternal Aunt     No Known Problems Maternal Aunt     No Known Problems Paternal Aunt        Meds/Allergies       Current Outpatient Medications:     amLODIPine (NORVASC) 5 mg tablet    Apremilast 30 MG TABS    aspirin 81 MG tablet    Cholecalciferol (VITAMIN D3) 5000 units TABS    diphenhydrAMINE (BENADRYL) 25 mg capsule    Fluocinolone Acetonide Scalp 0 01 % OIL    halobetasol (ULTRAVATE) 0 05 % ointment    hydrocortisone 2 5 % ointment    hydroxyurea (HYDREA) 500 mg capsule    losartan (COZAAR) 50 mg tablet    Probiotic Product (PROBIOTIC DAILY PO)    rosuvastatin (CRESTOR) 5 mg tablet  No current facility-administered medications for this encounter  Facility-Administered Medications Ordered in Other Encounters:     lactated ringers infusion, , Intravenous, Continuous PRN, New Bag at 08/15/22 0947    Allergies   Allergen Reactions    Keflex [Cephalexin] Rash    Neomycin-Polymyxin-Dexameth Eye Swelling       Objective     /71   Pulse 76   Temp (!) 96 7 °F (35 9 °C) (Skin)   Resp 18   Ht 5' 2" (1 575 m)   Wt 53 5 kg (118 lb)   SpO2 97%   BMI 21 58 kg/m²       PHYSICAL EXAM    Gen: NAD  Head: NCAT  CV: RRR  CHEST: Clear  ABD: soft, NT/ND  EXT: no edema      ASSESSMENT/PLAN:  This is a 70y o  year old female here for colonoscopy, and she is stable and optimized for her procedure

## 2022-08-15 NOTE — ANESTHESIA PREPROCEDURE EVALUATION
Procedure:  COLONOSCOPY    Relevant Problems   CARDIO   (+) HTN (hypertension), benign   (+) Mixed hyperlipidemia      MUSCULOSKELETAL   (+) Psoriatic arthritis (HCC)        Physical Exam    Airway    Mallampati score: II  TM Distance: >3 FB  Neck ROM: full     Dental   No notable dental hx     Cardiovascular  Cardiovascular exam normal    Pulmonary  Pulmonary exam normal     Other Findings        Anesthesia Plan  ASA Score- 2     Anesthesia Type- IV sedation with anesthesia with ASA Monitors  Additional Monitors:   Airway Plan:           Plan Factors-Exercise tolerance (METS): >4 METS  Chart reviewed  Patient summary reviewed  Patient is not a current smoker  Induction- intravenous  Postoperative Plan-     Informed Consent- Anesthetic plan and risks discussed with patient

## 2022-08-15 NOTE — INTERVAL H&P NOTE
H&P reviewed  After examining the patient I find no changes in the patients condition since the H&P had been written      Vitals:    08/15/22 0920   BP: 106/71   Pulse: 76   Resp: 18   Temp: (!) 96 7 °F (35 9 °C)   SpO2: 97%

## 2022-09-25 DIAGNOSIS — E78.2 MIXED HYPERLIPIDEMIA: ICD-10-CM

## 2022-09-25 DIAGNOSIS — I10 HTN (HYPERTENSION), BENIGN: Primary | ICD-10-CM

## 2022-09-26 ENCOUNTER — APPOINTMENT (OUTPATIENT)
Dept: LAB | Facility: HOSPITAL | Age: 72
End: 2022-09-26
Payer: MEDICARE

## 2022-09-26 DIAGNOSIS — D47.3 ESSENTIAL THROMBOCYTOSIS (HCC): ICD-10-CM

## 2022-09-26 LAB
ALBUMIN SERPL BCP-MCNC: 4.3 G/DL (ref 3.5–5)
ALP SERPL-CCNC: 37 U/L (ref 34–104)
ALT SERPL W P-5'-P-CCNC: 12 U/L (ref 7–52)
ANION GAP SERPL CALCULATED.3IONS-SCNC: 9 MMOL/L (ref 4–13)
AST SERPL W P-5'-P-CCNC: 19 U/L (ref 13–39)
BASOPHILS # BLD AUTO: 0.04 THOUSANDS/ΜL (ref 0–0.1)
BASOPHILS NFR BLD AUTO: 1 % (ref 0–1)
BILIRUB SERPL-MCNC: 0.34 MG/DL (ref 0.2–1)
BUN SERPL-MCNC: 24 MG/DL (ref 5–25)
CALCIUM SERPL-MCNC: 9.5 MG/DL (ref 8.4–10.2)
CHLORIDE SERPL-SCNC: 104 MMOL/L (ref 96–108)
CO2 SERPL-SCNC: 26 MMOL/L (ref 21–32)
CREAT SERPL-MCNC: 1.26 MG/DL (ref 0.6–1.3)
EOSINOPHIL # BLD AUTO: 0.08 THOUSAND/ΜL (ref 0–0.61)
EOSINOPHIL NFR BLD AUTO: 2 % (ref 0–6)
ERYTHROCYTE [DISTWIDTH] IN BLOOD BY AUTOMATED COUNT: 12.7 % (ref 11.6–15.1)
GFR SERPL CREATININE-BSD FRML MDRD: 42 ML/MIN/1.73SQ M
GLUCOSE SERPL-MCNC: 84 MG/DL (ref 65–140)
HCT VFR BLD AUTO: 35 % (ref 34.8–46.1)
HGB BLD-MCNC: 11.9 G/DL (ref 11.5–15.4)
IMM GRANULOCYTES # BLD AUTO: 0.01 THOUSAND/UL (ref 0–0.2)
IMM GRANULOCYTES NFR BLD AUTO: 0 % (ref 0–2)
LYMPHOCYTES # BLD AUTO: 0.97 THOUSANDS/ΜL (ref 0.6–4.47)
LYMPHOCYTES NFR BLD AUTO: 26 % (ref 14–44)
MCH RBC QN AUTO: 37.1 PG (ref 26.8–34.3)
MCHC RBC AUTO-ENTMCNC: 34 G/DL (ref 31.4–37.4)
MCV RBC AUTO: 109 FL (ref 82–98)
MONOCYTES # BLD AUTO: 0.38 THOUSAND/ΜL (ref 0.17–1.22)
MONOCYTES NFR BLD AUTO: 10 % (ref 4–12)
NEUTROPHILS # BLD AUTO: 2.28 THOUSANDS/ΜL (ref 1.85–7.62)
NEUTS SEG NFR BLD AUTO: 61 % (ref 43–75)
NRBC BLD AUTO-RTO: 0 /100 WBCS
PLATELET # BLD AUTO: 234 THOUSANDS/UL (ref 149–390)
PMV BLD AUTO: 8.9 FL (ref 8.9–12.7)
POTASSIUM SERPL-SCNC: 4.3 MMOL/L (ref 3.5–5.3)
PROT SERPL-MCNC: 6.5 G/DL (ref 6.4–8.4)
RBC # BLD AUTO: 3.21 MILLION/UL (ref 3.81–5.12)
SODIUM SERPL-SCNC: 139 MMOL/L (ref 135–147)
WBC # BLD AUTO: 3.76 THOUSAND/UL (ref 4.31–10.16)

## 2022-09-26 PROCEDURE — 36415 COLL VENOUS BLD VENIPUNCTURE: CPT

## 2022-09-26 PROCEDURE — 80053 COMPREHEN METABOLIC PANEL: CPT

## 2022-09-26 PROCEDURE — 85025 COMPLETE CBC W/AUTO DIFF WBC: CPT

## 2022-09-27 RX ORDER — LOSARTAN POTASSIUM 50 MG/1
TABLET ORAL
Qty: 90 TABLET | Refills: 0 | Status: SHIPPED | OUTPATIENT
Start: 2022-09-27

## 2022-09-27 RX ORDER — AMLODIPINE BESYLATE 5 MG/1
TABLET ORAL
Qty: 180 TABLET | Refills: 0 | Status: SHIPPED | OUTPATIENT
Start: 2022-09-27

## 2022-09-27 RX ORDER — ROSUVASTATIN CALCIUM 5 MG/1
TABLET, COATED ORAL
Qty: 90 TABLET | Refills: 0 | Status: SHIPPED | OUTPATIENT
Start: 2022-09-27

## 2022-09-27 NOTE — PROGRESS NOTES
Hematology/Oncology Outpatient Follow- up Note  Joanne Ruano, 1950, [id number]  2022        Chief Complaint   Patient presents with    Follow-up       HPI:  Joanne Ruano is a 71 yo female who was seen for initial consultation on 2020 regarding thrombocytosis   Her thrombocytosis was significant with platelet count in the range of 700 on several occasions   She has an elevated platelet count dating back to 2020  The remainder of her cell lines are WNL  Given that she has had consistently elevated platelets over the past 7 months, a myeloproliferative workup was ordered   Results of this indicate a positive JAK2 mutation confirming a diagnosis of essential thrombocytosis    Previous Hematologic History:    Workup    Current Hematologic/ Oncologic Treatment:    Hydrea 500 mg daily started on 20    ECO - Asymptomatic    Interval History:   The patient presents for routine follow up  She was last seen on 22  She is currently recovering from a cold  She states she has otherwise been feeling well  Denies any change to her medical history since last visit  Blood work completed on  reviewed  Her counts remain stable  WBC 3 76, Hgb 11 9, , Platelet count remains in a normal range, 234  CMP normal   She denies any side effects from Hydrea  Test Results:    Imaging: No results found  Labs:   Lab Results   Component Value Date    WBC 3 76 (L) 2022    HGB 11 9 2022    HCT 35 0 2022     (H) 2022     2022     Lab Results   Component Value Date    K 4 3 2022     2022    CO2 26 2022    BUN 24 2022    CREATININE 1 26 2022    GLUF 91 08/10/2022    CALCIUM 9 5 2022    AST 19 2022    ALT 12 2022    ALKPHOS 37 2022    EGFR 42 2022           Review of Systems   Constitutional: Negative for fever  HENT: Positive for congestion      Respiratory: Positive for cough  All other systems reviewed and are negative  Active Problems:   Patient Active Problem List   Diagnosis    TB lung, latent    P[address] [address]    Essential thrombocytosis [address] [address]    HTN (hypertension), benign    Mixed hyperlipidemia       Past Medical History:   Past Medical History:   Diagnosis Date    Essential thrombocytosis ([facility] [address]     controlled with medication    Hyperlipidemia     Hypertension     Nodular goiter     Psoriasis        Surgical History:   Past Surgical History:   Procedure Laterality Date    APPENDECTOMY      COLONOSCOPY  10/31/2018    DXA PROCEDURE (HISTORICAL)  04/21/2017    MAMMO (HISTORICAL)      8-24-18    MAMMO NEEDLE LOCALIZATION RIGHT (ALL INC) Right 07/13/2009    SKIN LESION EXCISION      bridge of nose       Family History:    Family History   Problem Relation Age of Onset    Stroke Mother     Tuberculosis Father     Cancer Brother         lung    Tuberculosis Brother     Coronary artery disease Brother     No Known Problems Daughter     No Known Problems Daughter     No Known Problems Maternal Grandmother     No Known Problems Maternal Grandfather     No Known Problems Paternal Grandmother     No Known Problems Paternal Grandfather     No Known Problems Maternal Aunt     No Known Problems Maternal Aunt     No Known Problems Maternal Aunt     No Known Problems Maternal Aunt     No Known Problems Paternal Aunt        Cancer-related family history includes Cancer in her brother      Social History:   Social History     Socioeconomic History    Marital status:      Spouse name: Not on file    Number of children: Not on file    Years of education: Not on file    Highest education level: Not on file   Occupational History    Not on file   Tobacco Use    Smoking status: Former Smoker    Smokeless tobacco: Never Used    Tobacco comment: Quit 2010   Vaping Use    Vaping Use: Never used   Substance and Sexual Activity    Alcohol use: Yes     Alcohol/week: 7 0 standard drinks     Types: 7 Glasses of wine per week    Drug use: Never    Sexual activity: Not on file   Other Topics Concern    Not on file   Social History Narrative    Not on file     Social Determinants of Health     Financial Resource Strain: Not on file   Food Insecurity: Not on file   Transportation Needs: Not on file   Physical Activity: Not on file   Stress: Not on file   Social Connections: Not on file   Intimate Partner Violence: Not on file   Housing Stability: Not on file       Current Medications:   Current Outpatient Medications   Medication Sig Dispense Refill    amLODIPine (NORVASC) 5 mg tablet TAKE 1 TABLET BY MOUTH TWICE DAILY 180 tablet 0    Apremilast 30 MG TABS 30 mg Every 12 hours       aspirin 81 MG tablet Take 81 mg by mouth daily       Cholecalciferol (VITAMIN D3) 5000 units TABS 5,000 Units Daily       diphenhydrAMINE (BENADRYL) 25 mg capsule Take 25 mg by mouth every 12 (twelve) hours as needed for itching       Fluocinolone Acetonide Scalp 0 01 % OIL       halobetasol (ULTRAVATE) 0 05 % ointment        hydrocortisone 2 5 % ointment       hydroxyurea (HYDREA) 500 mg capsule Take 1 capsule (500 mg total) by mouth daily 90 capsule 3    losartan (COZAAR) 50 mg tablet TAKE 1 TABLET BY MOUTH EVERY DAY 90 tablet 0    Probiotic Product (PROBIOTIC DAILY PO) Take 1 capsule by mouth daily      rosuvastatin (CRESTOR) 5 mg tablet TAKE 1 TABLET BY MOUTH DAILY AT BEDTIME 90 tablet 0     No current facility-administered medications for this visit  Allergies: Allergies   Allergen Reactions    Keflex [Cephalexin] Rash    Neomycin-Polymyxin-Dexameth Eye Swelling       Physical Exam:  /78 (BP Location: Left arm, Patient Position: Sitting, Cuff Size: Adult)   Pulse 74   Temp 98 °F (36 7 °C) (Temporal)   Resp 14   Ht 5' 2" (1 575 m)   Wt 54 4 kg (120 lb)   SpO2 98%   BMI 21 95 kg/m²   Body surface area is 1 54 meters squared  Wt Readings from Last 3 Encounters:   09/29/22 54 4 kg (120 lb)   08/15/22 53 5 kg (118 lb)   08/11/22 54 kg (119 lb)           Physical Exam  Constitutional:       General: She is not in acute distress  Appearance: Normal appearance  HENT:      Head: Normocephalic and atraumatic  Eyes:      General: No scleral icterus  Right eye: No discharge  Left eye: No discharge  Conjunctiva/sclera: Conjunctivae normal    Cardiovascular:      Rate and Rhythm: Normal rate and regular rhythm  Pulmonary:      Effort: Pulmonary effort is normal  No respiratory distress  Breath sounds: Normal breath sounds  Chest:   Breasts:      Right: No axillary adenopathy or supraclavicular adenopathy  Left: No axillary adenopathy or supraclavicular adenopathy  Abdominal:      General: Bowel sounds are normal  There is no distension  Palpations: Abdomen is soft  There is no mass  Tenderness: There is no abdominal tenderness  Musculoskeletal:         General: Normal range of motion  Lymphadenopathy:      Cervical: No cervical adenopathy  Upper Body:      Right upper body: No supraclavicular, axillary or pectoral adenopathy  Left upper body: No supraclavicular, axillary or pectoral adenopathy  Skin:     General: Skin is warm and dry  Neurological:      General: No focal deficit present  Mental Status: She is alert and oriented to person, place, and time  Psychiatric:         Mood and Affect: Mood normal          Behavior: Behavior normal          Assessment / Plan:    1  Essential thrombocytosis (HCC)    The patient is a very pleasant 77-year-old female   Myeloproliferative workup confirmed JAK2 positive essential thrombocytosis   She was started on Hydrea when her platelet count approached 800  This was back on 07/12/2020  She has been maintained on Hydrea 500 mg daily with a nice response    Her platelet count has now normalized and has remained stable over the past year  She is tolerating this dose without any side effects  Her most recent CBC demonstrates normal platelet count  Her mild macrocytosis is stable and likely secondary to her myeloproliferative disorder  No evidence of anemia  White count is 3 76  Differential is normal   CMP is normal   Patient will continue on current dose of Hydrea without change  She will return for follow-up visit in 3 months with another CBC and CMP  Patient verbalized understanding and was in agreement with the plan of care    She was instructed to call with any questions or concerns prior to her next office visit    Goals and Barriers:  Current Goal:  Prolong Survival from ET  Barriers: None  Patient's Capacity to Self Care:  Patient is able to self care  Portions of the record may have been created with voice recognition software  Occasional wrong word or "sound a like" substitutions may have occurred due to the inherent limitations of voice recognition software  Read the chart carefully and recognize, using context, where substitutions have occurred

## 2022-09-29 ENCOUNTER — OFFICE VISIT (OUTPATIENT)
Dept: HEMATOLOGY ONCOLOGY | Facility: CLINIC | Age: 72
End: 2022-09-29
Payer: MEDICARE

## 2022-09-29 VITALS
BODY MASS INDEX: 22.08 KG/M2 | WEIGHT: 120 LBS | DIASTOLIC BLOOD PRESSURE: 78 MMHG | RESPIRATION RATE: 14 BRPM | HEIGHT: 62 IN | OXYGEN SATURATION: 98 % | SYSTOLIC BLOOD PRESSURE: 120 MMHG | TEMPERATURE: 98 F | HEART RATE: 74 BPM

## 2022-09-29 DIAGNOSIS — D47.3 ESSENTIAL THROMBOCYTOSIS (HCC): Primary | ICD-10-CM

## 2022-09-29 PROCEDURE — 99214 OFFICE O/P EST MOD 30 MIN: CPT | Performed by: NURSE PRACTITIONER

## 2022-10-12 ENCOUNTER — CLINICAL SUPPORT (OUTPATIENT)
Dept: FAMILY MEDICINE CLINIC | Facility: CLINIC | Age: 72
End: 2022-10-12
Payer: MEDICARE

## 2022-10-12 DIAGNOSIS — Z23 IMMUNIZATION DUE: Primary | ICD-10-CM

## 2022-10-12 PROCEDURE — G0008 ADMIN INFLUENZA VIRUS VAC: HCPCS

## 2022-10-12 PROCEDURE — 90662 IIV NO PRSV INCREASED AG IM: CPT

## 2022-10-27 ENCOUNTER — DOCUMENTATION (OUTPATIENT)
Dept: HEMATOLOGY ONCOLOGY | Facility: CLINIC | Age: 72
End: 2022-10-27

## 2022-10-27 NOTE — PROGRESS NOTES
Voicemail received:    Valerio Tisha  My name is Pavel Gutierrez  I'm the patient of Doctor Ana Arthur and Jessica Parmar  And I was wondering if Jessica Parmar could get call me back at some point today or tomorrow  I'm having some issues with my hydroxyurea that I need to speak with her about  My number is [phone]  My date of birth is [id number]  Thank you  milton Toscano     Returned call to patient, she has had multiple sicknesses requiring antibiotics  WBC has been slightly decreased secondary to hydrea  Reviewed with Jessica Parmar, pt can decrease hydrea to 500mg Monday, Wednesday, Friday  We will recheck her labs in 4 weeks  Pt is agreeable to this change in plan

## 2022-11-14 ENCOUNTER — OFFICE VISIT (OUTPATIENT)
Dept: FAMILY MEDICINE CLINIC | Facility: CLINIC | Age: 72
End: 2022-11-14

## 2022-11-14 VITALS
RESPIRATION RATE: 16 BRPM | OXYGEN SATURATION: 99 % | SYSTOLIC BLOOD PRESSURE: 110 MMHG | HEIGHT: 62 IN | BODY MASS INDEX: 21.57 KG/M2 | TEMPERATURE: 98.6 F | HEART RATE: 61 BPM | DIASTOLIC BLOOD PRESSURE: 62 MMHG | WEIGHT: 117.2 LBS

## 2022-11-14 DIAGNOSIS — E78.2 MIXED HYPERLIPIDEMIA: ICD-10-CM

## 2022-11-14 DIAGNOSIS — D47.3 ESSENTIAL THROMBOCYTOSIS (HCC): ICD-10-CM

## 2022-11-14 DIAGNOSIS — L40.50 PSORIATIC ARTHRITIS (HCC): ICD-10-CM

## 2022-11-14 DIAGNOSIS — I10 HTN (HYPERTENSION), BENIGN: Primary | ICD-10-CM

## 2022-11-14 NOTE — PROGRESS NOTES
Assessment/Plan:         Problem List Items Addressed This Visit        Cardiovascular and Mediastinum    HTN (hypertension), benign - Primary     Under control  Continue current medication  We will re-evaluate at next office visit  Musculoskeletal and Integument    Psoriatic arthritis ([facility] [address] )     Under control  Continue current medication  We will re-evaluate at next office visit  Has been followed by a dermatologist            Hematopoietic and Hemostatic    Essential thrombocytosis ([facility] [address] )     Under control  Continue current medication  We will re-evaluate at next office visit  Has been followed by a hematologist            Other    Mixed hyperlipidemia     Under control  Continue current medication  We will re-evaluate at next office visit  Subjective:      Patient ID: Virgil Lu is a 67 y o  female  Patient here for review of chronic medical problems and review of the labs and imaging if it is applicable  Currently has no specific complaints other than mentioned in the review of systems  Denies chest pain, SOB, cough, abdominal pain, nausea, vomiting, fever, chills, lightheadedness, dizziness,headache, tingling or numbness  No bowel or bladder problem  The following portions of the patient's history were reviewed and updated as appropriate:   Past Medical History:  She has a past medical history of Essential thrombocytosis ([facility] ), Hyperlipidemia, Hypertension, Nodular goiter, and Psoriasis  ,  _______________________________________________________________________  Medical Problems:  does not have any pertinent problems on file ,  _______________________________________________________________________  Past Surgical History:   has a past surgical history that includes Appendectomy; Mammo needle localization right (all inc) (Right, 07/13/2009); Skin lesion excision; Colonoscopy (10/31/2018);  Mammo (historical); and DXA procedure(historical) (04/21/2017)  ,  _______________________________________________________________________  Family History:  family history includes Cancer in her brother; Coronary artery disease in her brother; No Known Problems in her daughter, daughter, maternal aunt, maternal aunt, maternal aunt, maternal aunt, maternal grandfather, maternal grandmother, paternal aunt, paternal grandfather, and paternal grandmother; Stroke in her mother; Tuberculosis in her brother and father ,  _______________________________________________________________________  Social History:   reports that she has quit smoking  She has never used smokeless tobacco  She reports current alcohol use of about 7 0 standard drinks of alcohol per week  She reports that she does not use drugs  ,  _______________________________________________________________________  Allergies:  is allergic to keflex [cephalexin] and neomycin-polymyxin-dexameth     _______________________________________________________________________  Current Outpatient Medications   Medication Sig Dispense Refill   • amLODIPine (NORVASC) 5 mg tablet TAKE 1 TABLET BY MOUTH TWICE DAILY 180 tablet 0   • Apremilast 30 MG TABS 30 mg Every 12 hours      • aspirin 81 MG tablet Take 81 mg by mouth daily      • Cholecalciferol (VITAMIN D3) 5000 units TABS 5,000 Units Daily      • diphenhydrAMINE (BENADRYL) 25 mg capsule Take 25 mg by mouth every 12 (twelve) hours as needed for itching      • Fluocinolone Acetonide Scalp 0 01 % OIL      • halobetasol (ULTRAVATE) 0 05 % ointment       • hydrocortisone 2 5 % ointment      • hydroxyurea (HYDREA) 500 mg capsule Take 1 capsule (500 mg total) by mouth daily 90 capsule 3   • losartan (COZAAR) 50 mg tablet TAKE 1 TABLET BY MOUTH EVERY DAY 90 tablet 0   • Probiotic Product (PROBIOTIC DAILY PO) Take 1 capsule by mouth daily     • rosuvastatin (CRESTOR) 5 mg tablet TAKE 1 TABLET BY MOUTH DAILY AT BEDTIME 90 tablet 0     No current facility-administered medications for this visit      _______________________________________________________________________  Review of Systems   Constitutional: Negative for chills, fatigue and fever  HENT: Negative for congestion, ear pain, rhinorrhea, sneezing and sore throat  Eyes: Negative for redness, itching and visual disturbance  Respiratory: Negative for cough, chest tightness and shortness of breath  Cardiovascular: Negative for chest pain, palpitations and leg swelling  Gastrointestinal: Negative for abdominal pain, blood in stool, diarrhea, nausea and vomiting  Endocrine: Negative for cold intolerance and heat intolerance  Genitourinary: Negative for dysuria, frequency and urgency  Musculoskeletal: Positive for arthralgias  Negative for back pain and myalgias  Skin: Negative for color change and rash  Neurological: Negative for dizziness, weakness, light-headedness, numbness and headaches  Hematological: Does not bruise/bleed easily  Psychiatric/Behavioral: Negative for agitation, behavioral problems and confusion  Objective:  Vitals:    11/14/22 1414   BP: 110/62   BP Location: Left arm   Patient Position: Sitting   Cuff Size: Standard   Pulse: 61   Resp: 16   Temp: 98 6 °F (37 °C)   TempSrc: Tympanic   SpO2: 99%   Weight: 53 2 kg (117 lb 3 2 oz)   Height: 5' 2" (1 575 m)     Body mass index is 21 44 kg/m²  Physical Exam  Vitals and nursing note reviewed  Constitutional:       General: She is not in acute distress  Appearance: Normal appearance  She is not ill-appearing, toxic-appearing or diaphoretic  HENT:      Head: Normocephalic and atraumatic  Right Ear: Tympanic membrane normal       Left Ear: Tympanic membrane normal       Nose: Nose normal  No congestion  Mouth/Throat:      Mouth: Mucous membranes are moist    Eyes:      General: No scleral icterus  Right eye: No discharge  Left eye: No discharge  Extraocular Movements: Extraocular movements intact  Conjunctiva/sclera: Conjunctivae normal       Pupils: Pupils are equal, round, and reactive to light  Cardiovascular:      Rate and Rhythm: Normal rate and regular rhythm  Pulses: Normal pulses  Heart sounds: Normal heart sounds  No murmur heard  No gallop  Pulmonary:      Effort: Pulmonary effort is normal  No respiratory distress  Breath sounds: Normal breath sounds  No wheezing, rhonchi or rales  Abdominal:      General: Abdomen is flat  Bowel sounds are normal  There is no distension  Palpations: Abdomen is soft  Tenderness: There is no abdominal tenderness  There is no guarding  Musculoskeletal:         General: No swelling or tenderness  Normal range of motion  Cervical back: Normal range of motion and neck supple  No rigidity  Lymphadenopathy:      Cervical: No cervical adenopathy  Skin:     General: Skin is warm  Capillary Refill: Capillary refill takes 2 to 3 seconds  Coloration: Skin is not jaundiced  Findings: No bruising or rash  Neurological:      General: No focal deficit present  Mental Status: She is alert and oriented to person, place, and time  Mental status is at baseline        Gait: Gait normal    Psychiatric:         Mood and Affect: Mood normal

## 2022-11-14 NOTE — ASSESSMENT & PLAN NOTE
Under control  Continue current medication  We will re-evaluate at next office visit    Has been followed by a hematologist

## 2022-11-14 NOTE — ASSESSMENT & PLAN NOTE
Under control  Continue current medication  We will re-evaluate at next office visit    Has been followed by a dermatologist

## 2022-11-28 ENCOUNTER — TELEPHONE (OUTPATIENT)
Dept: HEMATOLOGY ONCOLOGY | Facility: CLINIC | Age: 72
End: 2022-11-28

## 2022-11-28 DIAGNOSIS — D47.3 ESSENTIAL THROMBOCYTOSIS (HCC): Primary | ICD-10-CM

## 2022-11-28 NOTE — TELEPHONE ENCOUNTER
CALL RETURN FORM   Reason for patient call? Lab at [place] (not [place]) calling for lab orders  Patient states Ms Darrin Alan needs in Nov due to medication change  Patient's primary oncologist? Darrin Alan   Name of person the patient was calling for? Darrin Alan   Any additional information to add, if applicable? Call lab   Informed patient that the message will be forwarded to the team and someone will get back to them as soon as possible    Did you relay this information to the patient?  yes

## 2022-12-05 ENCOUNTER — APPOINTMENT (OUTPATIENT)
Dept: LAB | Facility: HOSPITAL | Age: 72
End: 2022-12-05

## 2022-12-05 DIAGNOSIS — D47.3 ESSENTIAL THROMBOCYTOSIS (HCC): ICD-10-CM

## 2022-12-05 DIAGNOSIS — D47.3 ESSENTIAL THROMBOCYTHEMIA (HCC): ICD-10-CM

## 2022-12-05 LAB
ALBUMIN SERPL BCP-MCNC: 4.3 G/DL (ref 3.5–5)
ALP SERPL-CCNC: 37 U/L (ref 34–104)
ALT SERPL W P-5'-P-CCNC: 9 U/L (ref 7–52)
ANION GAP SERPL CALCULATED.3IONS-SCNC: 9 MMOL/L (ref 4–13)
AST SERPL W P-5'-P-CCNC: 16 U/L (ref 13–39)
BASOPHILS # BLD AUTO: 0.04 THOUSANDS/ÂΜL (ref 0–0.1)
BASOPHILS NFR BLD AUTO: 1 % (ref 0–1)
BILIRUB SERPL-MCNC: 0.56 MG/DL (ref 0.2–1)
BUN SERPL-MCNC: 18 MG/DL (ref 5–25)
CALCIUM SERPL-MCNC: 9.7 MG/DL (ref 8.4–10.2)
CHLORIDE SERPL-SCNC: 105 MMOL/L (ref 96–108)
CO2 SERPL-SCNC: 27 MMOL/L (ref 21–32)
CREAT SERPL-MCNC: 1.12 MG/DL (ref 0.6–1.3)
EOSINOPHIL # BLD AUTO: 0.09 THOUSAND/ÂΜL (ref 0–0.61)
EOSINOPHIL NFR BLD AUTO: 2 % (ref 0–6)
ERYTHROCYTE [DISTWIDTH] IN BLOOD BY AUTOMATED COUNT: 12.4 % (ref 11.6–15.1)
GFR SERPL CREATININE-BSD FRML MDRD: 49 ML/MIN/1.73SQ M
GLUCOSE SERPL-MCNC: 82 MG/DL (ref 65–140)
HCT VFR BLD AUTO: 37.6 % (ref 34.8–46.1)
HGB BLD-MCNC: 12.7 G/DL (ref 11.5–15.4)
IMM GRANULOCYTES # BLD AUTO: 0.01 THOUSAND/UL (ref 0–0.2)
IMM GRANULOCYTES NFR BLD AUTO: 0 % (ref 0–2)
LYMPHOCYTES # BLD AUTO: 0.81 THOUSANDS/ÂΜL (ref 0.6–4.47)
LYMPHOCYTES NFR BLD AUTO: 21 % (ref 14–44)
MCH RBC QN AUTO: 37.1 PG (ref 26.8–34.3)
MCHC RBC AUTO-ENTMCNC: 33.8 G/DL (ref 31.4–37.4)
MCV RBC AUTO: 110 FL (ref 82–98)
MONOCYTES # BLD AUTO: 0.45 THOUSAND/ÂΜL (ref 0.17–1.22)
MONOCYTES NFR BLD AUTO: 12 % (ref 4–12)
NEUTROPHILS # BLD AUTO: 2.44 THOUSANDS/ÂΜL (ref 1.85–7.62)
NEUTS SEG NFR BLD AUTO: 64 % (ref 43–75)
NRBC BLD AUTO-RTO: 0 /100 WBCS
PLATELET # BLD AUTO: 357 THOUSANDS/UL (ref 149–390)
PMV BLD AUTO: 8.8 FL (ref 8.9–12.7)
POTASSIUM SERPL-SCNC: 4.2 MMOL/L (ref 3.5–5.3)
PROT SERPL-MCNC: 6.7 G/DL (ref 6.4–8.4)
RBC # BLD AUTO: 3.42 MILLION/UL (ref 3.81–5.12)
SODIUM SERPL-SCNC: 141 MMOL/L (ref 135–147)
WBC # BLD AUTO: 3.84 THOUSAND/UL (ref 4.31–10.16)

## 2022-12-24 DIAGNOSIS — I10 HTN (HYPERTENSION), BENIGN: ICD-10-CM

## 2022-12-24 DIAGNOSIS — E78.2 MIXED HYPERLIPIDEMIA: ICD-10-CM

## 2022-12-27 DIAGNOSIS — D47.3 ESSENTIAL THROMBOCYTOSIS (HCC): Primary | ICD-10-CM

## 2022-12-27 RX ORDER — AMLODIPINE BESYLATE 5 MG/1
TABLET ORAL
Qty: 180 TABLET | Refills: 0 | Status: SHIPPED | OUTPATIENT
Start: 2022-12-27

## 2022-12-27 RX ORDER — ROSUVASTATIN CALCIUM 5 MG/1
TABLET, COATED ORAL
Qty: 90 TABLET | Refills: 0 | Status: SHIPPED | OUTPATIENT
Start: 2022-12-27

## 2022-12-27 RX ORDER — LOSARTAN POTASSIUM 50 MG/1
TABLET ORAL
Qty: 90 TABLET | Refills: 0 | Status: SHIPPED | OUTPATIENT
Start: 2022-12-27

## 2023-01-02 ENCOUNTER — APPOINTMENT (OUTPATIENT)
Dept: LAB | Facility: CLINIC | Age: 73
End: 2023-01-02

## 2023-01-02 DIAGNOSIS — D47.3 ESSENTIAL THROMBOCYTOSIS (HCC): ICD-10-CM

## 2023-01-02 LAB
ALBUMIN SERPL BCP-MCNC: 4.2 G/DL (ref 3.5–5)
ALP SERPL-CCNC: 38 U/L (ref 34–104)
ALT SERPL W P-5'-P-CCNC: 11 U/L (ref 7–52)
ANION GAP SERPL CALCULATED.3IONS-SCNC: 5 MMOL/L (ref 4–13)
AST SERPL W P-5'-P-CCNC: 18 U/L (ref 13–39)
BASOPHILS # BLD AUTO: 0.05 THOUSANDS/ÂΜL (ref 0–0.1)
BASOPHILS NFR BLD AUTO: 1 % (ref 0–1)
BILIRUB SERPL-MCNC: 0.49 MG/DL (ref 0.2–1)
BUN SERPL-MCNC: 18 MG/DL (ref 5–25)
CALCIUM SERPL-MCNC: 9.3 MG/DL (ref 8.4–10.2)
CHLORIDE SERPL-SCNC: 106 MMOL/L (ref 96–108)
CO2 SERPL-SCNC: 28 MMOL/L (ref 21–32)
CREAT SERPL-MCNC: 1.06 MG/DL (ref 0.6–1.3)
EOSINOPHIL # BLD AUTO: 0.08 THOUSAND/ÂΜL (ref 0–0.61)
EOSINOPHIL NFR BLD AUTO: 2 % (ref 0–6)
ERYTHROCYTE [DISTWIDTH] IN BLOOD BY AUTOMATED COUNT: 11.9 % (ref 11.6–15.1)
GFR SERPL CREATININE-BSD FRML MDRD: 52 ML/MIN/1.73SQ M
GLUCOSE P FAST SERPL-MCNC: 75 MG/DL (ref 65–99)
HCT VFR BLD AUTO: 37 % (ref 34.8–46.1)
HGB BLD-MCNC: 12.2 G/DL (ref 11.5–15.4)
IMM GRANULOCYTES # BLD AUTO: 0.02 THOUSAND/UL (ref 0–0.2)
IMM GRANULOCYTES NFR BLD AUTO: 1 % (ref 0–2)
LYMPHOCYTES # BLD AUTO: 0.81 THOUSANDS/ÂΜL (ref 0.6–4.47)
LYMPHOCYTES NFR BLD AUTO: 20 % (ref 14–44)
MCH RBC QN AUTO: 35.9 PG (ref 26.8–34.3)
MCHC RBC AUTO-ENTMCNC: 33 G/DL (ref 31.4–37.4)
MCV RBC AUTO: 109 FL (ref 82–98)
MONOCYTES # BLD AUTO: 0.39 THOUSAND/ÂΜL (ref 0.17–1.22)
MONOCYTES NFR BLD AUTO: 10 % (ref 4–12)
NEUTROPHILS # BLD AUTO: 2.66 THOUSANDS/ÂΜL (ref 1.85–7.62)
NEUTS SEG NFR BLD AUTO: 66 % (ref 43–75)
NRBC BLD AUTO-RTO: 0 /100 WBCS
PLATELET # BLD AUTO: 353 THOUSANDS/UL (ref 149–390)
PMV BLD AUTO: 9 FL (ref 8.9–12.7)
POTASSIUM SERPL-SCNC: 4.2 MMOL/L (ref 3.5–5.3)
PROT SERPL-MCNC: 6.5 G/DL (ref 6.4–8.4)
RBC # BLD AUTO: 3.4 MILLION/UL (ref 3.81–5.12)
SODIUM SERPL-SCNC: 139 MMOL/L (ref 135–147)
WBC # BLD AUTO: 4.01 THOUSAND/UL (ref 4.31–10.16)

## 2023-01-03 NOTE — PROGRESS NOTES
Hematology/Oncology Outpatient Follow- up Note  Dorothy Aggarwal, 1950, [id number]  2023        Chief Complaint   Patient presents with   • Follow-up       HPI:  Dorothy Aggarwal is a 69 yo female who was seen for initial consultation on 2020 regarding thrombocytosis   Her thrombocytosis was significant with platelet count in the range of 700 on several occasions   She has an elevated platelet count dating back to 2020  The remainder of her cell lines are WNL  Given that she has had consistently elevated platelets over the past 7 months, a myeloproliferative workup was ordered   Results of this indicate a positive JAK2 mutation confirming a diagnosis of essential thrombocytosis    Previous Hematologic History:    Workup  Hydrea 500 mg daily started on 20    Current Hematologic/ Oncologic Treatment:    Hydrea 500 mg QOD     ECO - Asymptomatic    Interval History:   The patient presents for routine follow up  She was last seen on 22  We changed dosing of her Hydrea from daily to  since she had been suffering from recurrent illness and had leukopenia secondary to her Hydrea dosing  Patient reports she is actually been taking her Hydrea every other day  Since reducing her Hydrea dose she has not had any recurrent infection  And her white count has improved  Her platelets have gone up slightly but remain in a normal range  Blood work completed on 23 reviewed  WBC 4 01, Hgb 12 2, , Platelet count remains in a normal range, 353  CMP normal   She denies any side effects from Hydrea  Overall, patient reports she feels well denies any constitutional symptoms  Test Results:    Imaging: No results found      Labs:   Lab Results   Component Value Date    WBC 4 01 (L) 2023    HGB 12 2 2023    HCT 37 0 2023     (H) 2023     2023     Lab Results   Component Value Date    K 4 2 2023     01/02/2023    CO2 28 01/02/2023    BUN 18 01/02/2023    CREATININE 1 06 01/02/2023    GLUF 75 01/02/2023    CALCIUM 9 3 01/02/2023    AST 18 01/02/2023    ALT 11 01/02/2023    ALKPHOS 38 01/02/2023    EGFR 52 01/02/2023           Review of Systems   All other systems reviewed and are negative  Active Problems:   Patient Active Problem List   Diagnosis   • TB lung, latent   • Psoriatic arthritis ([facility] [address] )   • Essential thrombocytosis ([facility] [address] )   • HTN (hypertension), benign   • Mixed hyperlipidemia       Past Medical History:   Past Medical History:   Diagnosis Date   • Essential thrombocytosis ([facility] [address] )     controlled with medication   • Hyperlipidemia    • Hypertension    • Nodular goiter    • Psoriasis        Surgical History:   Past Surgical History:   Procedure Laterality Date   • APPENDECTOMY     • COLONOSCOPY  10/31/2018   • DXA PROCEDURE (HISTORICAL)  04/21/2017   • MAMMO (HISTORICAL)      8-24-18   • MAMMO NEEDLE LOCALIZATION RIGHT (ALL INC) Right 07/13/2009   • SKIN LESION EXCISION      bridge of nose       Family History:    Family History   Problem Relation Age of Onset   • Stroke Mother    • Tuberculosis Father    • Cancer Brother         lung   • Tuberculosis Brother    • Coronary artery disease Brother    • No Known Problems Daughter    • No Known Problems Daughter    • No Known Problems Maternal Grandmother    • No Known Problems Maternal Grandfather    • No Known Problems Paternal Grandmother    • No Known Problems Paternal Grandfather    • No Known Problems Maternal Aunt    • No Known Problems Maternal Aunt    • No Known Problems Maternal Aunt    • No Known Problems Maternal Aunt    • No Known Problems Paternal Aunt        Cancer-related family history includes Cancer in her brother      Social History:   Social History     Socioeconomic History   • Marital status:      Spouse name: Not on file   • Number of children: Not on file   • Years of education: Not on file   • Highest education level: Not on file   Occupational History   • Not on file   Tobacco Use   • Smoking status: Former   • Smokeless tobacco: Never   • Tobacco comments:     Quit 2010   Vaping Use   • Vaping Use: Never used   Substance and Sexual Activity   • Alcohol use: Yes     Alcohol/week: 7 0 standard drinks     Types: 7 Glasses of wine per week   • Drug use: Never   • Sexual activity: Not on file   Other Topics Concern   • Not on file   Social History Narrative   • Not on file     Social Determinants of Health     Financial Resource Strain: Not on file   Food Insecurity: Not on file   Transportation Needs: Not on file   Physical Activity: Not on file   Stress: Not on file   Social Connections: Not on file   Intimate Partner Violence: Not on file   Housing Stability: Not on file       Current Medications:   Current Outpatient Medications   Medication Sig Dispense Refill   • amLODIPine (NORVASC) 5 mg tablet TAKE 1 TABLET BY MOUTH TWICE DAILY 180 tablet 0   • Apremilast 30 MG TABS 30 mg Every 12 hours      • aspirin 81 MG tablet Take 81 mg by mouth daily      • Cholecalciferol (VITAMIN D3) 5000 units TABS 5,000 Units Daily      • diphenhydrAMINE (BENADRYL) 25 mg capsule Take 25 mg by mouth every 12 (twelve) hours as needed for itching      • Fluocinolone Acetonide Scalp 0 01 % OIL      • halobetasol (ULTRAVATE) 0 05 % ointment       • hydrocortisone 2 5 % ointment      • hydroxyurea (HYDREA) 500 mg capsule Take 1 capsule (500 mg total) by mouth daily 90 capsule 3   • losartan (COZAAR) 50 mg tablet TAKE 1 TABLET BY MOUTH EVERY DAY 90 tablet 0   • Probiotic Product (PROBIOTIC DAILY PO) Take 1 capsule by mouth daily     • rosuvastatin (CRESTOR) 5 mg tablet TAKE 1 TABLET BY MOUTH DAILY AT BEDTIME 90 tablet 0     No current facility-administered medications for this visit  Allergies:    Allergies   Allergen Reactions   • Keflex [Cephalexin] Rash   • Neomycin-Polymyxin-Dexameth Eye Swelling       Physical Exam:  /80 (BP Location: Left arm, Patient Position: Sitting, Cuff Size: Adult)   Pulse 66   Temp (!) 97 1 °F (36 2 °C) (Temporal)   Resp 16   Ht 5' 2" (1 575 m)   Wt 53 5 kg (118 lb)   SpO2 98%   BMI 21 58 kg/m²   Body surface area is 1 53 meters squared  Wt Readings from Last 3 Encounters:   01/05/23 53 5 kg (118 lb)   11/14/22 53 2 kg (117 lb 3 2 oz)   09/29/22 54 4 kg (120 lb)           Physical Exam  Constitutional:       General: She is not in acute distress  Appearance: Normal appearance  HENT:      Head: Normocephalic and atraumatic  Eyes:      General: No scleral icterus  Right eye: No discharge  Left eye: No discharge  Conjunctiva/sclera: Conjunctivae normal    Cardiovascular:      Rate and Rhythm: Normal rate and regular rhythm  Pulmonary:      Effort: Pulmonary effort is normal  No respiratory distress  Breath sounds: Normal breath sounds  Abdominal:      General: Bowel sounds are normal  There is no distension  Palpations: Abdomen is soft  There is no mass  Tenderness: There is no abdominal tenderness  Musculoskeletal:         General: Normal range of motion  Lymphadenopathy:      Cervical: No cervical adenopathy  Upper Body:      Right upper body: No supraclavicular, axillary or pectoral adenopathy  Left upper body: No supraclavicular, axillary or pectoral adenopathy  Skin:     General: Skin is warm and dry  Neurological:      General: No focal deficit present  Mental Status: She is alert and oriented to person, place, and time  Psychiatric:         Mood and Affect: Mood normal          Behavior: Behavior normal          Assessment / Plan:    1  Essential thrombocytosis (HCC)    The patient is a very pleasant 72-year-old female   Myeloproliferative workup confirmed JAK2 positive essential thrombocytosis   She was started on Hydrea when her platelet count approached 800  This was back on 07/12/2020    She has been maintained on Hydrea 500 mg daily with a nice response  Her platelet count normalized and has remained stable     Patient was experiencing recurrent infections and leukopenia secondary to Hydrea  We reduced her dose from daily to every other day  She has noticed improvement with this dose reduction  She has not had any recent infection  Her white count has improved  Her platelet count remains in normal range  Her mild macrocytosis is stable and likely secondary to her myeloproliferative disorder  No evidence of anemia  White count is 4 01  Differential is normal   CMP is normal   Patient will continue on current dose of Hydrea without change  I will monitor CBC every 4 weeks    She will return for follow-up visit in 3 months with another CBC and CMP  Patient verbalized understanding and was in agreement with the plan of care    She was instructed to call with any questions or concerns prior to her next office visit    Goals and Barriers:  Current Goal:  Prolong Survival from ET  Barriers: None  Patient's Capacity to Self Care:  Patient is able to self care  Portions of the record may have been created with voice recognition software  Occasional wrong word or "sound a like" substitutions may have occurred due to the inherent limitations of voice recognition software  Read the chart carefully and recognize, using context, where substitutions have occurred

## 2023-01-05 ENCOUNTER — OFFICE VISIT (OUTPATIENT)
Dept: HEMATOLOGY ONCOLOGY | Facility: CLINIC | Age: 73
End: 2023-01-05

## 2023-01-05 VITALS
RESPIRATION RATE: 16 BRPM | WEIGHT: 118 LBS | OXYGEN SATURATION: 98 % | TEMPERATURE: 97.1 F | DIASTOLIC BLOOD PRESSURE: 80 MMHG | HEIGHT: 62 IN | SYSTOLIC BLOOD PRESSURE: 142 MMHG | HEART RATE: 66 BPM | BODY MASS INDEX: 21.71 KG/M2

## 2023-01-05 DIAGNOSIS — D47.3 ESSENTIAL THROMBOCYTOSIS (HCC): Primary | ICD-10-CM

## 2023-02-06 ENCOUNTER — APPOINTMENT (OUTPATIENT)
Dept: LAB | Facility: CLINIC | Age: 73
End: 2023-02-06

## 2023-02-06 DIAGNOSIS — D47.3 ESSENTIAL THROMBOCYTOSIS (HCC): ICD-10-CM

## 2023-02-06 LAB
ALBUMIN SERPL BCP-MCNC: 4.4 G/DL (ref 3.5–5)
ALP SERPL-CCNC: 38 U/L (ref 34–104)
ALT SERPL W P-5'-P-CCNC: 10 U/L (ref 7–52)
ANION GAP SERPL CALCULATED.3IONS-SCNC: 7 MMOL/L (ref 4–13)
AST SERPL W P-5'-P-CCNC: 15 U/L (ref 13–39)
BASOPHILS # BLD AUTO: 0.05 THOUSANDS/ÂΜL (ref 0–0.1)
BASOPHILS NFR BLD AUTO: 1 % (ref 0–1)
BILIRUB SERPL-MCNC: 0.43 MG/DL (ref 0.2–1)
BUN SERPL-MCNC: 19 MG/DL (ref 5–25)
CALCIUM SERPL-MCNC: 9.7 MG/DL (ref 8.4–10.2)
CHLORIDE SERPL-SCNC: 106 MMOL/L (ref 96–108)
CO2 SERPL-SCNC: 28 MMOL/L (ref 21–32)
CREAT SERPL-MCNC: 1.11 MG/DL (ref 0.6–1.3)
EOSINOPHIL # BLD AUTO: 0.11 THOUSAND/ÂΜL (ref 0–0.61)
EOSINOPHIL NFR BLD AUTO: 2 % (ref 0–6)
ERYTHROCYTE [DISTWIDTH] IN BLOOD BY AUTOMATED COUNT: 11.9 % (ref 11.6–15.1)
GFR SERPL CREATININE-BSD FRML MDRD: 49 ML/MIN/1.73SQ M
GLUCOSE SERPL-MCNC: 93 MG/DL (ref 65–140)
HCT VFR BLD AUTO: 38.6 % (ref 34.8–46.1)
HGB BLD-MCNC: 12.5 G/DL (ref 11.5–15.4)
IMM GRANULOCYTES # BLD AUTO: 0.02 THOUSAND/UL (ref 0–0.2)
IMM GRANULOCYTES NFR BLD AUTO: 0 % (ref 0–2)
LYMPHOCYTES # BLD AUTO: 0.96 THOUSANDS/ÂΜL (ref 0.6–4.47)
LYMPHOCYTES NFR BLD AUTO: 20 % (ref 14–44)
MCH RBC QN AUTO: 34.2 PG (ref 26.8–34.3)
MCHC RBC AUTO-ENTMCNC: 32.4 G/DL (ref 31.4–37.4)
MCV RBC AUTO: 106 FL (ref 82–98)
MONOCYTES # BLD AUTO: 0.42 THOUSAND/ÂΜL (ref 0.17–1.22)
MONOCYTES NFR BLD AUTO: 9 % (ref 4–12)
NEUTROPHILS # BLD AUTO: 3.16 THOUSANDS/ÂΜL (ref 1.85–7.62)
NEUTS SEG NFR BLD AUTO: 68 % (ref 43–75)
NRBC BLD AUTO-RTO: 0 /100 WBCS
PLATELET # BLD AUTO: 363 THOUSANDS/UL (ref 149–390)
PMV BLD AUTO: 9.1 FL (ref 8.9–12.7)
POTASSIUM SERPL-SCNC: 4.6 MMOL/L (ref 3.5–5.3)
PROT SERPL-MCNC: 6.8 G/DL (ref 6.4–8.4)
RBC # BLD AUTO: 3.65 MILLION/UL (ref 3.81–5.12)
SODIUM SERPL-SCNC: 141 MMOL/L (ref 135–147)
WBC # BLD AUTO: 4.72 THOUSAND/UL (ref 4.31–10.16)

## 2023-02-16 ENCOUNTER — OFFICE VISIT (OUTPATIENT)
Dept: FAMILY MEDICINE CLINIC | Facility: CLINIC | Age: 73
End: 2023-02-16

## 2023-02-16 VITALS
TEMPERATURE: 97.8 F | DIASTOLIC BLOOD PRESSURE: 68 MMHG | WEIGHT: 119 LBS | BODY MASS INDEX: 21.9 KG/M2 | HEART RATE: 66 BPM | SYSTOLIC BLOOD PRESSURE: 110 MMHG | HEIGHT: 62 IN | OXYGEN SATURATION: 99 %

## 2023-02-16 DIAGNOSIS — N18.31 STAGE 3A CHRONIC KIDNEY DISEASE (CKD) (HCC): ICD-10-CM

## 2023-02-16 DIAGNOSIS — D47.3 ESSENTIAL THROMBOCYTOSIS (HCC): ICD-10-CM

## 2023-02-16 DIAGNOSIS — L40.50 PSORIATIC ARTHRITIS (HCC): ICD-10-CM

## 2023-02-16 DIAGNOSIS — E78.2 MIXED HYPERLIPIDEMIA: ICD-10-CM

## 2023-02-16 DIAGNOSIS — I10 HTN (HYPERTENSION), BENIGN: Primary | ICD-10-CM

## 2023-02-16 NOTE — ASSESSMENT & PLAN NOTE
Under control  Continue current medication  We will re-evaluate at next office visit    Has been followed by hematologist

## 2023-02-16 NOTE — PROGRESS NOTES
Assessment/Plan:         Problem List Items Addressed This Visit        Cardiovascular and Mediastinum    HTN (hypertension), benign - Primary     Under control  Continue current medication  We will re-evaluate at next office visit  Musculoskeletal and Integument    Psoriatic arthritis ([facility] [address] )     Under control  Continue current medication  We will re-evaluate at next office visit  Hematopoietic and Hemostatic    Essential thrombocytosis (HCC)     Under control  Continue current medication  We will re-evaluate at next office visit  Has been followed by hematologist            Genitourinary    Stage 3a chronic kidney disease (CKD) ([facility] [address] )     Lab Results   Component Value Date    EGFR 49 02/06/2023    EGFR 52 01/02/2023    EGFR 49 12/05/2022    CREATININE 1 11 02/06/2023    CREATININE 1 06 01/02/2023    CREATININE 1 12 12/05/2022   creatinine and GFR stable we will reevaluate at next office visit              Other    Mixed hyperlipidemia     Under control  Continue current medication  We will re-evaluate at next office visit  Subjective:      Patient ID: Lenin Anderson is a 67 y o  female  Patient here for review of chronic medical problems and review of the labs and imaging if it is applicable  Currently has no specific complaints other than mentioned in the review of systems  Denies chest pain, SOB, cough, abdominal pain, nausea, vomiting, fever, chills, lightheadedness, dizziness,headache, tingling or numbness  No bowel or bladder problem  The following portions of the patient's history were reviewed and updated as appropriate:   Past Medical History:  She has a past medical history of Allergic (2022), Essential thrombocytosis ([facility] [address] ), Hyperlipidemia, Hypertension, Nodular goiter, and Psoriasis  ,  _______________________________________________________________________  Medical Problems:  does not have any pertinent problems on file ,  _______________________________________________________________________  Past Surgical History:   has a past surgical history that includes Appendectomy; Mammo needle localization right (all inc) (Right, 07/13/2009); Skin lesion excision; Colonoscopy (10/31/2018); Mammo (historical); and DXA procedure(historical) (04/21/2017)  ,  _______________________________________________________________________  Family History:  family history includes Cancer in her brother and brother; Coronary artery disease in her brother; Depression in her mother; Hearing loss in her mother; Hypertension in her brother and mother; No Known Problems in her daughter, daughter, maternal aunt, maternal aunt, maternal aunt, maternal aunt, maternal grandfather, maternal grandmother, paternal aunt, paternal grandfather, and paternal grandmother; Stroke in her mother; Tuberculosis in her brother and father ,  _______________________________________________________________________  Social History:   reports that she has quit smoking  Her smoking use included cigarettes  She has a 25 00 pack-year smoking history  She has never used smokeless tobacco  She reports current alcohol use of about 5 0 standard drinks per week  She reports that she does not use drugs  ,  _______________________________________________________________________  Allergies:  is allergic to keflex [cephalexin] and neomycin-polymyxin-dexameth     _______________________________________________________________________  Current Outpatient Medications   Medication Sig Dispense Refill   • amLODIPine (NORVASC) 5 mg tablet TAKE 1 TABLET BY MOUTH TWICE DAILY 180 tablet 0   • Apremilast 30 MG TABS 30 mg Every 12 hours      • aspirin 81 MG tablet Take 81 mg by mouth daily      • Cholecalciferol (VITAMIN D3) 5000 units TABS 5,000 Units Daily      • diphenhydrAMINE (BENADRYL) 25 mg capsule Take 25 mg by mouth every 12 (twelve) hours as needed for itching      • Fluocinolone Acetonide Scalp 0 01 % OIL      • halobetasol (ULTRAVATE) 0 05 % ointment       • hydrocortisone 2 5 % ointment      • hydroxyurea (HYDREA) 500 mg capsule Take 1 capsule (500 mg total) by mouth daily 90 capsule 3   • losartan (COZAAR) 50 mg tablet TAKE 1 TABLET BY MOUTH EVERY DAY 90 tablet 0   • Probiotic Product (PROBIOTIC DAILY PO) Take 1 capsule by mouth daily     • rosuvastatin (CRESTOR) 5 mg tablet TAKE 1 TABLET BY MOUTH DAILY AT BEDTIME 90 tablet 0     No current facility-administered medications for this visit      _______________________________________________________________________  Review of Systems   Constitutional: Negative for chills, fatigue and fever  HENT: Negative for congestion, ear pain, rhinorrhea, sneezing and sore throat  Eyes: Negative for redness, itching and visual disturbance  Respiratory: Negative for cough, chest tightness and shortness of breath  Cardiovascular: Negative for chest pain, palpitations and leg swelling  Gastrointestinal: Negative for abdominal pain, blood in stool, diarrhea, nausea and vomiting  Endocrine: Negative for cold intolerance and heat intolerance  Genitourinary: Negative for dysuria, frequency and urgency  Musculoskeletal: Negative for arthralgias, back pain and myalgias  Skin: Negative for color change and rash  Neurological: Negative for dizziness, weakness, light-headedness, numbness and headaches  Hematological: Does not bruise/bleed easily  Psychiatric/Behavioral: Negative for agitation, behavioral problems and confusion  Objective:  Vitals:    02/16/23 1427   BP: 110/68   BP Location: Right arm   Patient Position: Sitting   Cuff Size: Adult   Pulse: 66   Temp: 97 8 °F (36 6 °C)   TempSrc: Tympanic   SpO2: 99%   Weight: 54 kg (119 lb)   Height: 5' 2" (1 575 m)     Body mass index is 21 77 kg/m²  Physical Exam  Vitals and nursing note reviewed  Constitutional:       General: She is not in acute distress       Appearance: Normal appearance  She is not ill-appearing, toxic-appearing or diaphoretic  HENT:      Head: Normocephalic and atraumatic  Right Ear: Tympanic membrane normal       Left Ear: Tympanic membrane normal       Nose: Nose normal  No congestion  Mouth/Throat:      Mouth: Mucous membranes are moist    Eyes:      General: No scleral icterus  Right eye: No discharge  Left eye: No discharge  Extraocular Movements: Extraocular movements intact  Conjunctiva/sclera: Conjunctivae normal       Pupils: Pupils are equal, round, and reactive to light  Cardiovascular:      Rate and Rhythm: Normal rate and regular rhythm  Pulses: Normal pulses  Heart sounds: Normal heart sounds  No murmur heard  No gallop  Pulmonary:      Effort: Pulmonary effort is normal  No respiratory distress  Breath sounds: Normal breath sounds  No wheezing, rhonchi or rales  Abdominal:      General: Abdomen is flat  Bowel sounds are normal  There is no distension  Palpations: Abdomen is soft  Tenderness: There is no abdominal tenderness  There is no guarding  Musculoskeletal:         General: No swelling or tenderness  Normal range of motion  Cervical back: Normal range of motion and neck supple  No rigidity  Lymphadenopathy:      Cervical: No cervical adenopathy  Skin:     General: Skin is warm  Capillary Refill: Capillary refill takes 2 to 3 seconds  Coloration: Skin is not jaundiced  Findings: No bruising or rash  Neurological:      General: No focal deficit present  Mental Status: She is alert and oriented to person, place, and time  Mental status is at baseline        Gait: Gait normal    Psychiatric:         Mood and Affect: Mood normal

## 2023-02-16 NOTE — ASSESSMENT & PLAN NOTE
Lab Results   Component Value Date    EGFR 49 02/06/2023    EGFR 52 01/02/2023    EGFR 49 12/05/2022    CREATININE 1 11 02/06/2023    CREATININE 1 06 01/02/2023    CREATININE 1 12 12/05/2022   creatinine and GFR stable we will reevaluate at next office visit

## 2023-02-22 ENCOUNTER — HOSPITAL ENCOUNTER (EMERGENCY)
Facility: HOSPITAL | Age: 73
Discharge: HOME | End: 2023-02-22
Attending: EMERGENCY MEDICINE

## 2023-02-22 VITALS
OXYGEN SATURATION: 100 % | HEIGHT: 63 IN | TEMPERATURE: 97.7 F | SYSTOLIC BLOOD PRESSURE: 155 MMHG | DIASTOLIC BLOOD PRESSURE: 79 MMHG | BODY MASS INDEX: 20.55 KG/M2 | HEART RATE: 66 BPM | WEIGHT: 116 LBS | RESPIRATION RATE: 17 BRPM

## 2023-02-22 DIAGNOSIS — R09.89 GLOBUS SENSATION: Primary | ICD-10-CM

## 2023-02-22 NOTE — ED PROVIDER NOTES
History  Chief Complaint   Patient presents with   • Foreign Body in Throat     Pt presents to ed via walk in with complaint of feeling like something is stuck in there throat after she was eating a pork chop pt able to tolerate PO fluids and her own secretions in no distress during triage      70-year-old female presents to the emergency department for evaluation of a suspected esophageal foreign body  The patient reports that she was eating a boneless pork loin and as she was swallowing a bite she felt like it got stuck in her throat  She reports that afterwards she drank soda and water which she was able to tolerate but states that she still has a sensation in her throat like something is stuck  She denies having any difficulty with swallowing her own secretions  No coughing or shortness of breath  She did not take any medications to treat her symptoms prior to arrival           Prior to Admission Medications   Prescriptions Last Dose Informant Patient Reported? Taking?    Apremilast 30 MG TABS   Yes No   Si mg Every 12 hours    Cholecalciferol (VITAMIN D3) 5000 units TABS   Yes No   Si,000 Units Daily    Fluocinolone Acetonide Scalp 0 01 % OIL   Yes No   Probiotic Product (PROBIOTIC DAILY PO)   Yes No   Sig: Take 1 capsule by mouth daily   amLODIPine (NORVASC) 5 mg tablet   No No   Sig: TAKE 1 TABLET BY MOUTH TWICE DAILY   aspirin 81 MG tablet   Yes No   Sig: Take 81 mg by mouth daily    diphenhydrAMINE (BENADRYL) 25 mg capsule   Yes No   Sig: Take 25 mg by mouth every 12 (twelve) hours as needed for itching    halobetasol (ULTRAVATE) 0 05 % ointment   Yes No   Sig:     hydrocortisone 2 5 % ointment   Yes No   hydroxyurea (HYDREA) 500 mg capsule   No No   Sig: Take 1 capsule (500 mg total) by mouth daily   losartan (COZAAR) 50 mg tablet   No No   Sig: TAKE 1 TABLET BY MOUTH EVERY DAY   rosuvastatin (CRESTOR) 5 mg tablet   No No   Sig: TAKE 1 TABLET BY MOUTH DAILY AT BEDTIME Facility-Administered Medications: None       Past Medical History:   Diagnosis Date   • Allergic     Allergic to neomiacin and cephalexin   • Essential thrombocytosis ([address] )     controlled with medication   • Hyperlipidemia    • Hypertension    • Nodular goiter    • Psoriasis        Past Surgical History:   Procedure Laterality Date   • APPENDECTOMY     • COLONOSCOPY  10/31/2018   • DXA PROCEDURE (HISTORICAL)  2017   • MAMMO (HISTORICAL)      18   • MAMMO NEEDLE LOCALIZATION RIGHT (ALL INC) Right 2009   • SKIN LESION EXCISION      bridge of nose       Family History   Problem Relation Age of Onset   • Stroke Mother    • Depression Mother            • Hypertension Mother    • Hearing loss Mother    • Tuberculosis Father    • Cancer Brother         lung   • Tuberculosis Brother    • Coronary artery disease Brother    • Hypertension Brother    • No Known Problems Daughter    • No Known Problems Daughter    • No Known Problems Maternal Grandmother    • No Known Problems Maternal Grandfather    • No Known Problems Paternal Grandmother    • No Known Problems Paternal Grandfather    • No Known Problems Maternal Aunt    • No Known Problems Maternal Aunt    • No Known Problems Maternal Aunt    • No Known Problems Maternal Aunt    • No Known Problems Paternal Aunt    • Cancer Brother         Throat canver     I have reviewed and agree with the history as documented  E-Cigarette/Vaping   • E-Cigarette Use Never User      E-Cigarette/Vaping Substances   • Nicotine No    • THC No    • CBD No    • Flavoring No    • Other No    • Unknown No      Social History     Tobacco Use   • Smoking status: Former     Packs/day:      Years: 25 00     Pack years: 25 00     Types: Cigarettes   • Smokeless tobacco: Never   • Tobacco comments:     Quit    Vaping Use   • Vaping Use: Never used   Substance Use Topics   • Alcohol use:  Yes     Alcohol/week: 5 0 standard drinks     Types: 5 Glasses of wine per week   • Drug use: Never       Review of Systems   Constitutional: Negative for chills and fever  HENT: Negative for drooling, ear pain, sore throat, trouble swallowing and voice change  Eyes: Negative for pain and visual disturbance  Respiratory: Negative for cough and shortness of breath  Cardiovascular: Negative for chest pain and palpitations  Gastrointestinal: Negative for abdominal pain and vomiting  Genitourinary: Negative for dysuria and hematuria  Musculoskeletal: Negative for arthralgias and back pain  Skin: Negative for color change and rash  Neurological: Negative for seizures and syncope  All other systems reviewed and are negative  Physical Exam  Physical Exam  Vitals and nursing note reviewed  Constitutional:       General: She is not in acute distress  Appearance: She is well-developed  HENT:      Head: Normocephalic and atraumatic  Mouth/Throat:      Mouth: Mucous membranes are moist  No injury  Pharynx: Oropharynx is clear  Uvula midline  Eyes:      Conjunctiva/sclera: Conjunctivae normal    Cardiovascular:      Rate and Rhythm: Normal rate and regular rhythm  Heart sounds: No murmur heard  Pulmonary:      Effort: Pulmonary effort is normal  No respiratory distress  Breath sounds: Normal breath sounds  Abdominal:      Palpations: Abdomen is soft  Tenderness: There is no abdominal tenderness  Musculoskeletal:         General: No swelling  Cervical back: Neck supple  Skin:     General: Skin is warm and dry  Capillary Refill: Capillary refill takes less than 2 seconds  Neurological:      Mental Status: She is alert     Psychiatric:         Mood and Affect: Mood normal          Vital Signs  ED Triage Vitals [02/22/23 1555]   Temperature Pulse Respirations Blood Pressure SpO2   97 7 °F (36 5 °C) 66 17 155/79 100 %      Temp Source Heart Rate Source Patient Position - Orthostatic VS BP Location FiO2 (%)   Oral Monitor Sitting Left arm --      Pain Score       No Pain           Vitals:    02/22/23 1555   BP: 155/79   Pulse: 66   Patient Position - Orthostatic VS: Sitting         Visual Acuity      ED Medications  Medications - No data to display    Diagnostic Studies  Results Reviewed     None                 No orders to display              Procedures  Procedures         ED Course                 SBIRT 20yo+    Flowsheet Row Most Recent Value   SBIRT (23 yo +)    In order to provide better care to our patients, we are screening all of our patients for alcohol and drug use  Would it be okay to ask you these screening questions? No Filed at: 02/22/2023 1558                    Medical Decision Making  51-year-old female presented to the emergency department for evaluation of a suspected esophageal foreign body  On arrival the patient was awake, alert, oriented and in no acute distress  Initial vital signs within normal limits  The patient able to speak in complete sentences without difficulty  Patient handling her own secretions without difficulty  No drooling or coughing  Patient drinking fluids without difficulty  Further diagnostic options were discussed with the patient including advanced imaging with x-rays or CT scans  Risks versus benefits were discussed and the patient decided to decline the further imaging  An ambulatory referral to gastroenterology was placed  Return precautions were discussed  Patient agrees with the plan for discharge and feels comfortable to go home with proper f/u  Advised to return for worsening or additional problems  Diagnostic tests were reviewed and questions answered  Diagnosis, care plan and treatment options were discussed  The patient understands instructions and will follow up as directed        Globus sensation: acute illness or injury      Disposition  Final diagnoses:   Globus sensation     Time reflects when diagnosis was documented in both MDM as applicable and the Disposition within this note     Time User Action Codes Description Comment    2/22/2023  4:21 PM Jaime Cesar Add [R09 [id number]] Globus sensation       ED Disposition     ED Disposition   Discharge    Condition   Stable    Date/Time   Wed Feb 22, 2023  4:21 PM    Comment   Jay Perish discharge to home/self care                 Follow-up Information     Follow up With Specialties Details Why Contact Info Additional Information    Bethany Mtz MD Internal Medicine Schedule an appointment as soon as possible for a visit   [address] [phone]       [facility] [address] Gastroenterology Schedule an appointment as soon as possible for a visit   [address] Elías Carlin [address] [facility] [id number]  [phone] [facility] [address], [address] [place] [address], [address], [place], [place], [facility]    [address] Paula[address] Emergency Department Emergency Medicine Go to  If symptoms worsen [address] [facility],[address] [id number]  [address] [facility] Emergency Department, [address]          Patient's Medications   Discharge Prescriptions    No medications on file           PDMP Review     None          ED Provider  Electronically Signed by           Darrell Spence MD  02/22/23 [id number]

## 2023-03-09 ENCOUNTER — TELEPHONE (OUTPATIENT)
Age: 73
End: 2023-03-09

## 2023-03-09 NOTE — TELEPHONE ENCOUNTER
Called patient and spoke with patient  Rescheduled appointment due to provider not seeing patients at the location any longer  Patient confirmed the appointment, date, time location, and provider

## 2023-03-16 ENCOUNTER — APPOINTMENT (OUTPATIENT)
Dept: LAB | Facility: HOSPITAL | Age: 73
End: 2023-03-16

## 2023-03-16 DIAGNOSIS — D47.3 ESSENTIAL THROMBOCYTOSIS (HCC): ICD-10-CM

## 2023-03-16 LAB
ALBUMIN SERPL BCP-MCNC: 4.5 G/DL (ref 3.5–5)
ALP SERPL-CCNC: 38 U/L (ref 34–104)
ALT SERPL W P-5'-P-CCNC: 10 U/L (ref 7–52)
ANION GAP SERPL CALCULATED.3IONS-SCNC: 7 MMOL/L (ref 4–13)
AST SERPL W P-5'-P-CCNC: 15 U/L (ref 13–39)
BASOPHILS # BLD AUTO: 0.06 THOUSANDS/ÂΜL (ref 0–0.1)
BASOPHILS NFR BLD AUTO: 1 % (ref 0–1)
BILIRUB SERPL-MCNC: 0.53 MG/DL (ref 0.2–1)
BUN SERPL-MCNC: 19 MG/DL (ref 5–25)
CALCIUM SERPL-MCNC: 9.7 MG/DL (ref 8.4–10.2)
CHLORIDE SERPL-SCNC: 105 MMOL/L (ref 96–108)
CO2 SERPL-SCNC: 29 MMOL/L (ref 21–32)
CREAT SERPL-MCNC: 1.16 MG/DL (ref 0.6–1.3)
EOSINOPHIL # BLD AUTO: 0.12 THOUSAND/ÂΜL (ref 0–0.61)
EOSINOPHIL NFR BLD AUTO: 3 % (ref 0–6)
ERYTHROCYTE [DISTWIDTH] IN BLOOD BY AUTOMATED COUNT: 12.9 % (ref 11.6–15.1)
GFR SERPL CREATININE-BSD FRML MDRD: 47 ML/MIN/1.73SQ M
GLUCOSE P FAST SERPL-MCNC: 101 MG/DL (ref 65–99)
HCT VFR BLD AUTO: 40 % (ref 34.8–46.1)
HGB BLD-MCNC: 13 G/DL (ref 11.5–15.4)
IMM GRANULOCYTES # BLD AUTO: 0.02 THOUSAND/UL (ref 0–0.2)
IMM GRANULOCYTES NFR BLD AUTO: 0 % (ref 0–2)
LYMPHOCYTES # BLD AUTO: 0.83 THOUSANDS/ÂΜL (ref 0.6–4.47)
LYMPHOCYTES NFR BLD AUTO: 18 % (ref 14–44)
MCH RBC QN AUTO: 33.8 PG (ref 26.8–34.3)
MCHC RBC AUTO-ENTMCNC: 32.5 G/DL (ref 31.4–37.4)
MCV RBC AUTO: 104 FL (ref 82–98)
MONOCYTES # BLD AUTO: 0.42 THOUSAND/ÂΜL (ref 0.17–1.22)
MONOCYTES NFR BLD AUTO: 9 % (ref 4–12)
NEUTROPHILS # BLD AUTO: 3.3 THOUSANDS/ÂΜL (ref 1.85–7.62)
NEUTS SEG NFR BLD AUTO: 69 % (ref 43–75)
NRBC BLD AUTO-RTO: 0 /100 WBCS
PLATELET # BLD AUTO: 423 THOUSANDS/UL (ref 149–390)
PMV BLD AUTO: 8.9 FL (ref 8.9–12.7)
POTASSIUM SERPL-SCNC: 4.5 MMOL/L (ref 3.5–5.3)
PROT SERPL-MCNC: 7 G/DL (ref 6.4–8.4)
RBC # BLD AUTO: 3.85 MILLION/UL (ref 3.81–5.12)
SODIUM SERPL-SCNC: 141 MMOL/L (ref 135–147)
WBC # BLD AUTO: 4.75 THOUSAND/UL (ref 4.31–10.16)

## 2023-03-24 DIAGNOSIS — I10 HTN (HYPERTENSION), BENIGN: ICD-10-CM

## 2023-03-24 DIAGNOSIS — E78.2 MIXED HYPERLIPIDEMIA: ICD-10-CM

## 2023-03-24 RX ORDER — AMLODIPINE BESYLATE 5 MG/1
TABLET ORAL
Qty: 180 TABLET | Refills: 0 | Status: SHIPPED | OUTPATIENT
Start: 2023-03-24

## 2023-03-24 RX ORDER — LOSARTAN POTASSIUM 50 MG/1
TABLET ORAL
Qty: 90 TABLET | Refills: 0 | Status: SHIPPED | OUTPATIENT
Start: 2023-03-24

## 2023-03-24 RX ORDER — ROSUVASTATIN CALCIUM 5 MG/1
TABLET, COATED ORAL
Qty: 90 TABLET | Refills: 0 | Status: SHIPPED | OUTPATIENT
Start: 2023-03-24

## 2023-04-28 ENCOUNTER — TELEPHONE (OUTPATIENT)
Dept: HEMATOLOGY ONCOLOGY | Facility: CLINIC | Age: 73
End: 2023-04-28

## 2023-04-28 NOTE — TELEPHONE ENCOUNTER
Called and left patient a voice message stating that there are labs that need to be collected prior to her appointment with [facility]-Jamia Hale on 5/3  Mentioned that the orders are already in the system and that as long as patient goes to a [facility] there is no paper work needed  Mentioned if patient is unable to have labs collected to call the office back so we can reschedule her appointment for another date and time

## 2023-05-01 ENCOUNTER — APPOINTMENT (OUTPATIENT)
Dept: LAB | Facility: CLINIC | Age: 73
End: 2023-05-01

## 2023-05-01 DIAGNOSIS — D47.3 ESSENTIAL THROMBOCYTOSIS (HCC): ICD-10-CM

## 2023-05-01 LAB
ALBUMIN SERPL BCP-MCNC: 4.4 G/DL (ref 3.5–5)
ALP SERPL-CCNC: 42 U/L (ref 34–104)
ALT SERPL W P-5'-P-CCNC: 10 U/L (ref 7–52)
ANION GAP SERPL CALCULATED.3IONS-SCNC: 8 MMOL/L (ref 4–13)
AST SERPL W P-5'-P-CCNC: 15 U/L (ref 13–39)
BILIRUB SERPL-MCNC: 0.48 MG/DL (ref 0.2–1)
BUN SERPL-MCNC: 19 MG/DL (ref 5–25)
CALCIUM SERPL-MCNC: 9.4 MG/DL (ref 8.4–10.2)
CHLORIDE SERPL-SCNC: 105 MMOL/L (ref 96–108)
CO2 SERPL-SCNC: 26 MMOL/L (ref 21–32)
CREAT SERPL-MCNC: 1.07 MG/DL (ref 0.6–1.3)
GFR SERPL CREATININE-BSD FRML MDRD: 51 ML/MIN/1.73SQ M
GLUCOSE SERPL-MCNC: 92 MG/DL (ref 65–140)
POTASSIUM SERPL-SCNC: 4.1 MMOL/L (ref 3.5–5.3)
PROT SERPL-MCNC: 6.9 G/DL (ref 6.4–8.4)
SODIUM SERPL-SCNC: 139 MMOL/L (ref 135–147)

## 2023-05-03 ENCOUNTER — OFFICE VISIT (OUTPATIENT)
Dept: HEMATOLOGY ONCOLOGY | Facility: CLINIC | Age: 73
End: 2023-05-03

## 2023-05-03 VITALS
RESPIRATION RATE: 16 BRPM | BODY MASS INDEX: 20.55 KG/M2 | HEART RATE: 65 BPM | DIASTOLIC BLOOD PRESSURE: 60 MMHG | WEIGHT: 116 LBS | HEIGHT: 63 IN | SYSTOLIC BLOOD PRESSURE: 102 MMHG | TEMPERATURE: 97 F | OXYGEN SATURATION: 95 %

## 2023-05-03 DIAGNOSIS — Z15.89 JAK2 V617F MUTATION: ICD-10-CM

## 2023-05-03 DIAGNOSIS — D47.3 ESSENTIAL THROMBOCYTOSIS (HCC): Primary | ICD-10-CM

## 2023-05-03 DIAGNOSIS — Z51.81 ENCOUNTER FOR MONITORING OF HYDROXYUREA THERAPY: ICD-10-CM

## 2023-05-03 DIAGNOSIS — Z79.64 ENCOUNTER FOR MONITORING OF HYDROXYUREA THERAPY: ICD-10-CM

## 2023-05-03 RX ORDER — HYDROXYUREA 500 MG/1
500 CAPSULE ORAL DAILY
Qty: 90 CAPSULE | Refills: 3 | Status: SHIPPED | OUTPATIENT
Start: 2023-05-03

## 2023-05-03 NOTE — PROGRESS NOTES
[address] [facility] [address]  Hematology Ambulatory Follow-Up  [facility], 1950, [id number]  5/3/2023    Assessment/Plan:  1  Essential thrombocytosis ([address] )  2  JAK2 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  3  Encounter for monitoring of hydroxyurea therapy  Ms Laureen Black is a 17-year-old female seen in follow-up/transfer of care for JAK2 positive essential thrombocytosis  She was started on Hydrea in July 2020 and has been tolerating this well without any significant side effects  She was dose reduced a few months ago due to leukopenia and increased infections  She has since returned back to taking 500 mg once daily and tolerating this well  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 and would like to continue on the once daily dosing  Her white count remained stable and she has not had any recent infections  She would also like to continue monitoring her CBC every 4 weeks  We agreed she will have CBC drawn every month with CMP every 2 months  She knows to call the office with any new or worsening symptoms for sooner evaluation     - hydroxyurea (HYDREA) 500 mg capsule; Take 1 capsule (500 mg total) by mouth daily  Dispense: 90 capsule; Refill: 3  - CBC and differential; Standing  - Comprehensive metabolic panel; Standing      The patient is scheduled for follow-up in approximately 4 months  Patient voiced agreement and understanding to the above  Patient knows to call the Hematology/Oncology office with any questions and concerns regarding the above        Barrier(s) to care: None  The patient is able to self care   ------------------------------------------------------------------------------------------------------    Chief Complaint   Patient presents with    Follow-up       History of present illness:   Jen is a 17-year-old female with past medical history of hypertension, psoriatic arthritis, CKD stage III, hyperlipidemia, and latent TB  She is seen in follow-up/transfer of care from [facility] NP for essential thrombocytosis  She has had an elevated platelet count dating back to January 2028 all of her other cell lines were within normal limits  Her highest platelet count was around 700,000  Myeloproliferative work-up demonstrated positive JAK2 mutation and she was started on 500 mg of Hydrea daily in July 2020  At her last visit her dosing was changed to Monday Wednesday Friday after having leukopenia and illnesses more frequently  At the time she was working in a school around young children  She had increased her dose in March back to every day as her platelet count had slightly increased  She is also no longer working at the school and has retired  She is doing well and feels good and has no complaints or concerns today  Review of Systems   Constitutional: Negative for activity change, appetite change, diaphoresis, fatigue, fever and unexpected weight change  HENT: Negative for trouble swallowing and voice change  Eyes: Negative for photophobia and visual disturbance  Respiratory: Negative for cough, chest tightness and shortness of breath  Cardiovascular: Negative for chest pain, palpitations and leg swelling  Gastrointestinal: Negative for abdominal distention, abdominal pain, anal bleeding, blood in stool, constipation, diarrhea, nausea and vomiting  Endocrine: Negative for cold intolerance and heat intolerance  Genitourinary: Negative for dysuria, hematuria and urgency  Musculoskeletal: Negative for arthralgias, back pain, gait problem, joint swelling and myalgias  Skin: Negative for pallor and rash  Neurological: Negative for dizziness, weakness, light-headedness and headaches  Hematological: Negative for adenopathy  Does not bruise/bleed easily  Psychiatric/Behavioral: Negative for confusion and sleep disturbance         Patient Active Problem List Diagnosis    TB lung, latent    [facility] [address] [facility]    Essential thrombocytosis ([facility] [facility]    HTN (hypertension), benign    Mixed hyperlipidemia    [facility]    Encounter for monitoring of hydroxyurea therapy    JAK2 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       Past Medical History:   Diagnosis Date    Allergic     Allergic to neomiacin and cephalexin    Essential thrombocytosis ([facility] )     controlled with medication    Hyperlipidemia     Hypertension     Nodular goiter     Psoriasis        Past Surgical History:   Procedure Laterality Date    APPENDECTOMY      COLONOSCOPY  10/31/2018    DXA PROCEDURE (HISTORICAL)  2017    MAMMO (HISTORICAL)      18    MAMMO NEEDLE LOCALIZATION RIGHT (ALL INC) Right 2009    SKIN LESION EXCISION      bridge of nose       Family History   Problem Relation Age of Onset    Stroke Mother     Depression Mother             Hypertension Mother     Hearing loss Mother     Tuberculosis Father     Cancer Brother         lung    Tuberculosis Brother     Coronary artery disease Brother     Hypertension Brother     No Known Problems Daughter     No Known Problems Daughter     No Known Problems Maternal Grandmother     No Known Problems Maternal Grandfather     No Known Problems Paternal Grandmother     No Known Problems Paternal Grandfather     No Known Problems Maternal Aunt     No Known Problems Maternal Aunt     No Known Problems Maternal Aunt     No Known Problems Maternal Aunt     No Known Problems Paternal Aunt     Cancer Brother         Throat canver       Social History     Socioeconomic History    Marital status:      Spouse name: Not on file    Number of children: Not on file    Years of education: Not on file    Highest education level: Not on file   Occupational History    Not on file   Tobacco Use    Smoking status: Former     Packs/day: 1 00     Years: 25 00     Pack years: 25 00 Types: Cigarettes    Smokeless tobacco: Never    Tobacco comments:     Quit 2010   Vaping Use    Vaping Use: Never used   Substance and Sexual Activity    Alcohol use:  Yes     Alcohol/week: 5 0 standard drinks     Types: 5 Glasses of wine per week    Drug use: Never    Sexual activity: Not Currently     Birth control/protection: Post-menopausal   Other Topics Concern    Not on file   Social History Narrative    Not on file     Social Determinants of Health     Financial Resource Strain: Not on file   Food Insecurity: Not on file   Transportation Needs: Not on file   Physical Activity: Not on file   Stress: Not on file   Social Connections: Not on file   Intimate Partner Violence: Not on file   Housing Stability: Not on file         Current Outpatient Medications:     amLODIPine (NORVASC) 5 mg tablet, TAKE 1 TABLET BY MOUTH TWICE DAILY, Disp: 180 tablet, Rfl: 0    Apremilast 30 MG TABS, 30 mg Every 12 hours , Disp: , Rfl:     aspirin 81 MG tablet, Take 81 mg by mouth daily , Disp: , Rfl:     Cholecalciferol (VITAMIN D3) 5000 units TABS, 5,000 Units Daily , Disp: , Rfl:     diphenhydrAMINE (BENADRYL) 25 mg capsule, Take 25 mg by mouth every 12 (twelve) hours as needed for itching , Disp: , Rfl:     Fluocinolone Acetonide Scalp 0 01 % OIL, , Disp: , Rfl:     halobetasol (ULTRAVATE) 0 05 % ointment,  , Disp: , Rfl:     hydrocortisone 2 5 % ointment, , Disp: , Rfl:     hydroxyurea (HYDREA) 500 mg capsule, Take 1 capsule (500 mg total) by mouth daily, Disp: 90 capsule, Rfl: 3    losartan (COZAAR) 50 mg tablet, TAKE 1 TABLET BY MOUTH EVERY DAY, Disp: 90 tablet, Rfl: 0    Probiotic Product (PROBIOTIC DAILY PO), Take 1 capsule by mouth daily, Disp: , Rfl:     rosuvastatin (CRESTOR) 5 mg tablet, TAKE 1 TABLET BY MOUTH DAILY AT BEDTIME, Disp: 90 tablet, Rfl: 0    Allergies   Allergen Reactions    Keflex [Cephalexin] Rash    Neomycin-Polymyxin-Dexameth Eye Swelling       Objective:  /60 (BP Location: "Left arm, Patient Position: Sitting, Cuff Size: Adult)   Pulse 65   Temp (!) 97 °F (36 1 °C)   Resp 16   Ht 5' 3\" (1 6 m)   Wt 52 6 kg (116 lb)   SpO2 95%   BMI 20 55 kg/m²    Physical Exam  Constitutional:       General: She is not in acute distress  Appearance: Normal appearance  She is normal weight  She is not ill-appearing  HENT:      Head: Normocephalic and atraumatic  Eyes:      Extraocular Movements: Extraocular movements intact  Conjunctiva/sclera: Conjunctivae normal       Pupils: Pupils are equal, round, and reactive to light  Cardiovascular:      Rate and Rhythm: Normal rate and regular rhythm  Pulses: Normal pulses  Heart sounds: Normal heart sounds  No murmur heard  Pulmonary:      Effort: Pulmonary effort is normal  No respiratory distress  Breath sounds: Normal breath sounds  No stridor  Abdominal:      General: Abdomen is flat  There is no distension  Palpations: Abdomen is soft  Tenderness: There is no abdominal tenderness  Musculoskeletal:         General: Normal range of motion  Cervical back: Normal range of motion  No tenderness  Right lower leg: No edema  Left lower leg: No edema  Lymphadenopathy:      Cervical: No cervical adenopathy  Skin:     General: Skin is warm and dry  Capillary Refill: Capillary refill takes less than 2 seconds  Coloration: Skin is not jaundiced or pale  Neurological:      General: No focal deficit present  Mental Status: She is alert and oriented to person, place, and time  Mental status is at baseline  Motor: No weakness  Gait: Gait normal    Psychiatric:         Mood and Affect: Mood normal          Behavior: Behavior normal          Thought Content:  Thought content normal          Judgment: Judgment normal          Result Review  Labs:  Appointment on 05/01/2023   Component Date Value Ref Range Status    Sodium 05/01/2023 139  135 - 147 mmol/L Final    Potassium " 05/01/2023 4 1  3 5 - 5 3 mmol/L Final    Chloride 05/01/2023 105  96 - 108 mmol/L Final    CO2 05/01/2023 26  21 - 32 mmol/L Final    ANION GAP 05/01/2023 8  4 - 13 mmol/L Final    BUN 05/01/2023 19  5 - 25 mg/dL Final    Creatinine 05/01/2023 1 07  0 60 - 1 30 mg/dL Final    Standardized to IDMS reference method    Glucose 05/01/2023 92  65 - 140 mg/dL Final    If the patient is fasting, the ADA then defines impaired fasting glucose as > 100 mg/dL and diabetes as > or equal to 123 mg/dL   Calcium 05/01/2023 9 4  8 4 - 10 2 mg/dL Final    AST 05/01/2023 15  13 - 39 U/L Final    ALT 05/01/2023 10  7 - 52 U/L Final    Specimen collection should occur prior to Sulfasalazine administration due to the potential for falsely depressed results   Alkaline Phosphatase 05/01/2023 42  34 - 104 U/L Final    Total Protein 05/01/2023 6 9  6 4 - 8 4 g/dL Final    Albumin 05/01/2023 4 4  3 5 - 5 0 g/dL Final    Total Bilirubin 05/01/2023 0 48  0 20 - 1 00 mg/dL Final    Use of this assay is not recommended for patients undergoing treatment with eltrombopag due to the potential for falsely elevated results  N-acetyl-p-benzoquinone imine (metabolite of Acetaminophen) will generate erroneously low results in samples for patients that have taken an overdose of Acetaminophen   eGFR 05/01/2023 51  ml/min/1 73sq m Final       Imaging:   No relevant imaging to review    Please note: This report has been generated by a voice recognition software system  Therefore there may be syntax, spelling, and/or grammatical errors  Please call if you have any questions

## 2023-05-25 ENCOUNTER — RA CDI HCC (OUTPATIENT)
Dept: OTHER | Facility: HOSPITAL | Age: 73
End: 2023-05-25

## 2023-05-25 NOTE — PROGRESS NOTES
[address]  coding opportunities       Chart reviewed, no opportunity found: CHART REVIEWED, NO OPPORTUNITY FOUND        Patients Insurance     Medicare Insurance: Medicare

## 2023-06-01 ENCOUNTER — OFFICE VISIT (OUTPATIENT)
Dept: FAMILY MEDICINE CLINIC | Facility: CLINIC | Age: 73
End: 2023-06-01

## 2023-06-01 VITALS
DIASTOLIC BLOOD PRESSURE: 60 MMHG | HEIGHT: 63 IN | TEMPERATURE: 97.9 F | OXYGEN SATURATION: 99 % | HEART RATE: 63 BPM | SYSTOLIC BLOOD PRESSURE: 100 MMHG | WEIGHT: 116 LBS | BODY MASS INDEX: 20.55 KG/M2

## 2023-06-01 DIAGNOSIS — E55.9 VITAMIN D DEFICIENCY: ICD-10-CM

## 2023-06-01 DIAGNOSIS — N18.31 STAGE 3A CHRONIC KIDNEY DISEASE (CKD) (HCC): ICD-10-CM

## 2023-06-01 DIAGNOSIS — Z12.31 ENCOUNTER FOR SCREENING MAMMOGRAM FOR BREAST CANCER: Primary | ICD-10-CM

## 2023-06-01 DIAGNOSIS — E28.39 MENOPAUSE OVARIAN FAILURE: ICD-10-CM

## 2023-06-01 DIAGNOSIS — I10 HTN (HYPERTENSION), BENIGN: ICD-10-CM

## 2023-06-01 DIAGNOSIS — E78.2 MIXED HYPERLIPIDEMIA: ICD-10-CM

## 2023-06-01 DIAGNOSIS — D47.3 ESSENTIAL THROMBOCYTOSIS (HCC): ICD-10-CM

## 2023-06-01 DIAGNOSIS — L40.50 PSORIATIC ARTHRITIS (HCC): ICD-10-CM

## 2023-06-01 NOTE — ASSESSMENT & PLAN NOTE
Under control  Continue current medication  We will re-evaluate at next office visit    Patient has been followed by a rheumatologist

## 2023-06-01 NOTE — ASSESSMENT & PLAN NOTE
Lab Results   Component Value Date    CREATININE 1 07 05/01/2023    CREATININE 1 16 03/16/2023    CREATININE 1 11 02/06/2023    EGFR 51 05/01/2023    EGFR 47 03/16/2023    EGFR 49 02/06/2023   creatinine and GFR stable   Will need periodic BMP  Avoid NSAIDs like ibuprofen Aleve Advil etc   Avoid high potassium diet    We will continue to monitor

## 2023-06-01 NOTE — PROGRESS NOTES
Assessment/Plan:         Problem List Items Addressed This Visit        Cardiovascular and Mediastinum    HTN (hypertension), benign     Under control  Continue current medication  We will re-evaluate at next office visit  Relevant Orders    UA w Reflex to Microscopic w Reflex to Culture       Musculoskeletal and Integument    Psoriatic arthritis ([place] [address] )     Under control  Continue current medication  We will re-evaluate at next office visit  Patient has been followed by a rheumatologist            Hematopoietic and Hemostatic    Essential thrombocytosis ([place] [address] )     Platelet count stable  Has been followed by hematologist            Genitourinary    Stage 3a chronic kidney disease (CKD) ([place] [address] )     Lab Results   Component Value Date    CREATININE 1 07 05/01/2023    CREATININE 1 16 03/16/2023    CREATININE 1 11 02/06/2023    EGFR 51 05/01/2023    EGFR 47 03/16/2023    EGFR 49 02/06/2023   creatinine and GFR stable   Will need periodic BMP  Avoid NSAIDs like ibuprofen Aleve Advil etc   Avoid high potassium diet  We will continue to monitor                 Other    Mixed hyperlipidemia     Under control  Continue current medication  We will re-evaluate at next office visit  Relevant Orders    Lipid Panel with Direct LDL reflex   Other Visit Diagnoses     Encounter for screening mammogram for breast cancer    -  Primary    Relevant Orders    Mammo screening bilateral w 3d & cad    Menopause ovarian failure        Relevant Orders    DXA bone density spine hip and pelvis    Vitamin D deficiency        Relevant Orders    Vitamin D 25 hydroxy            Subjective:      Patient ID: Kaye Dodd is a 67 y o  female  Patient here for review of chronic medical problems and  the labs and imaging if it is applicable    Currently has no specific complaints other than mentioned in the review of systems  Denies chest pain, SOB, cough, abdominal pain, nausea, vomiting, fever, chills, lightheadedness, dizziness,headache, tingling or numbness  No bowel or bladder problem  The following portions of the patient's history were reviewed and updated as appropriate:   Past Medical History:  She has a past medical history of Allergic (2022), Essential thrombocytosis ([address] ), Hyperlipidemia, Hypertension, Nodular goiter, and Psoriasis  ,  _______________________________________________________________________  Medical Problems:  does not have any pertinent problems on file ,  _______________________________________________________________________  Past Surgical History:   has a past surgical history that includes Appendectomy; Mammo needle localization right (all inc) (Right, 07/13/2009); Skin lesion excision; Colonoscopy (10/31/2018); Mammo (historical); and DXA procedure(historical) (04/21/2017)  ,  _______________________________________________________________________  Family History:  family history includes Cancer in her brother and brother; Coronary artery disease in her brother; Depression in her mother; Hearing loss in her mother; Hypertension in her brother and mother; No Known Problems in her daughter, daughter, maternal aunt, maternal aunt, maternal aunt, maternal aunt, maternal grandfather, maternal grandmother, paternal aunt, paternal grandfather, and paternal grandmother; Stroke in her mother; Tuberculosis in her brother and father ,  _______________________________________________________________________  Social History:   reports that she has quit smoking  Her smoking use included cigarettes  She has a 25 00 pack-year smoking history  She has never used smokeless tobacco  She reports current alcohol use of about 5 0 standard drinks of alcohol per week  She reports that she does not use drugs  ,  _______________________________________________________________________  Allergies:  is allergic to keflex [cephalexin] and neomycin-polymyxin-dexameth     _______________________________________________________________________  Current Outpatient Medications   Medication Sig Dispense Refill   • amLODIPine (NORVASC) 5 mg tablet TAKE 1 TABLET BY MOUTH TWICE DAILY 180 tablet 0   • Apremilast 30 MG TABS 30 mg Every 12 hours      • aspirin 81 MG tablet Take 81 mg by mouth daily      • Cholecalciferol (VITAMIN D3) 5000 units TABS 5,000 Units Daily      • diphenhydrAMINE (BENADRYL) 25 mg capsule Take 25 mg by mouth every 12 (twelve) hours as needed for itching      • Fluocinolone Acetonide Scalp 0 01 % OIL      • halobetasol (ULTRAVATE) 0 05 % ointment       • hydrocortisone 2 5 % ointment      • hydroxyurea (HYDREA) 500 mg capsule Take 1 capsule (500 mg total) by mouth daily 90 capsule 3   • losartan (COZAAR) 50 mg tablet TAKE 1 TABLET BY MOUTH EVERY DAY 90 tablet 0   • Probiotic Product (PROBIOTIC DAILY PO) Take 1 capsule by mouth daily     • rosuvastatin (CRESTOR) 5 mg tablet TAKE 1 TABLET BY MOUTH DAILY AT BEDTIME 90 tablet 0     No current facility-administered medications for this visit      _______________________________________________________________________  Review of Systems   Constitutional: Negative for chills, fatigue and fever  HENT: Negative for congestion, ear pain, rhinorrhea, sneezing and sore throat  Eyes: Negative for redness, itching and visual disturbance  Respiratory: Negative for cough, chest tightness and shortness of breath  Cardiovascular: Negative for chest pain, palpitations and leg swelling  Gastrointestinal: Negative for abdominal pain, blood in stool, diarrhea, nausea and vomiting  Endocrine: Negative for cold intolerance and heat intolerance  Genitourinary: Negative for dysuria, frequency and urgency  Musculoskeletal: Negative for arthralgias, back pain and myalgias  Skin: Negative for color change and rash     Neurological: Negative for dizziness, weakness, light-headedness, numbness and "headaches  Hematological: Does not bruise/bleed easily  Psychiatric/Behavioral: Negative for agitation, behavioral problems and confusion  Objective:  Vitals:    06/01/23 0857   BP: 100/60   BP Location: Left arm   Patient Position: Sitting   Cuff Size: Adult   Pulse: 63   Temp: 97 9 °F (36 6 °C)   TempSrc: Tympanic   SpO2: 99%   Weight: 52 6 kg (116 lb)   Height: 5' 3\" (1 6 m)     Body mass index is 20 55 kg/m²  Physical Exam  Vitals and nursing note reviewed  Constitutional:       General: She is not in acute distress  Appearance: Normal appearance  She is not ill-appearing, toxic-appearing or diaphoretic  HENT:      Head: Normocephalic and atraumatic  Right Ear: Tympanic membrane normal       Left Ear: Tympanic membrane normal       Nose: Nose normal  No congestion  Mouth/Throat:      Mouth: Mucous membranes are moist    Eyes:      General: No scleral icterus  Right eye: No discharge  Left eye: No discharge  Extraocular Movements: Extraocular movements intact  Conjunctiva/sclera: Conjunctivae normal       Pupils: Pupils are equal, round, and reactive to light  Cardiovascular:      Rate and Rhythm: Normal rate and regular rhythm  Pulses: Normal pulses  Heart sounds: Normal heart sounds  No murmur heard  No gallop  Pulmonary:      Effort: Pulmonary effort is normal  No respiratory distress  Breath sounds: Normal breath sounds  No wheezing, rhonchi or rales  Abdominal:      General: Abdomen is flat  Bowel sounds are normal  There is no distension  Palpations: Abdomen is soft  Tenderness: There is no abdominal tenderness  There is no guarding  Musculoskeletal:         General: No swelling or tenderness  Normal range of motion  Cervical back: Normal range of motion and neck supple  No rigidity  Lymphadenopathy:      Cervical: No cervical adenopathy  Skin:     General: Skin is warm        Capillary Refill: Capillary " refill takes 2 to 3 seconds  Coloration: Skin is not jaundiced  Findings: No bruising or rash  Neurological:      General: No focal deficit present  Mental Status: She is alert and oriented to person, place, and time  Mental status is at baseline        Gait: Gait normal    Psychiatric:         Mood and Affect: Mood normal

## 2023-06-12 ENCOUNTER — OFFICE VISIT (OUTPATIENT)
Dept: FAMILY MEDICINE CLINIC | Facility: CLINIC | Age: 73
End: 2023-06-12
Payer: MEDICARE

## 2023-06-12 VITALS
BODY MASS INDEX: 20.8 KG/M2 | HEART RATE: 78 BPM | OXYGEN SATURATION: 98 % | SYSTOLIC BLOOD PRESSURE: 108 MMHG | TEMPERATURE: 96.7 F | DIASTOLIC BLOOD PRESSURE: 58 MMHG | HEIGHT: 63 IN | RESPIRATION RATE: 16 BRPM | WEIGHT: 117.4 LBS

## 2023-06-12 DIAGNOSIS — K11.20 SIALOADENITIS OF SUBMANDIBULAR GLAND: Primary | ICD-10-CM

## 2023-06-12 PROCEDURE — 99213 OFFICE O/P EST LOW 20 MIN: CPT

## 2023-06-12 RX ORDER — AMOXICILLIN AND CLAVULANATE POTASSIUM 875; 125 MG/1; MG/1
1 TABLET, FILM COATED ORAL EVERY 12 HOURS SCHEDULED
Qty: 14 TABLET | Refills: 0 | Status: SHIPPED | OUTPATIENT
Start: 2023-06-12 | End: 2023-06-19

## 2023-06-12 NOTE — PROGRESS NOTES
Assessment/Plan:         Problem List Items Addressed This Visit    None  Visit Diagnoses     Sialoadenitis of submandibular gland    -  Primary    Relevant Medications    amoxicillin-clavulanate (AUGMENTIN) 875-125 mg per tablet            Subjective:      Patient ID: Gabe López is a 67 y o  female  Patient here for sick visit  Complaining of lump underneath the mandible  On the left side  No fever or chills  No sore throat or earache  No headache  No other complaints  The following portions of the patient's history were reviewed and updated as appropriate:   Past Medical History:  She has a past medical history of Allergic (2022), Essential thrombocytosis ([address] ), Hyperlipidemia, Hypertension, Nodular goiter, and Psoriasis  ,  _______________________________________________________________________  Medical Problems:  does not have any pertinent problems on file ,  _______________________________________________________________________  Past Surgical History:   has a past surgical history that includes Appendectomy; Mammo needle localization right (all inc) (Right, 07/13/2009); Skin lesion excision; Colonoscopy (10/31/2018); Mammo (historical); and DXA procedure(historical) (04/21/2017)  ,  _______________________________________________________________________  Family History:  family history includes Cancer in her brother and brother; Coronary artery disease in her brother; Depression in her mother; Hearing loss in her mother; Hypertension in her brother and mother; No Known Problems in her daughter, daughter, maternal aunt, maternal aunt, maternal aunt, maternal aunt, maternal grandfather, maternal grandmother, paternal aunt, paternal grandfather, and paternal grandmother; Stroke in her mother; Tuberculosis in her brother and father ,  _______________________________________________________________________  Social History:   reports that she has quit smoking  Her smoking use included cigarettes  She has a 25 00 pack-year smoking history  She has never used smokeless tobacco  She reports current alcohol use of about 5 0 standard drinks of alcohol per week  She reports that she does not use drugs  ,  _______________________________________________________________________  Allergies:  is allergic to keflex [cephalexin] and neomycin-polymyxin-dexameth     _______________________________________________________________________  Current Outpatient Medications   Medication Sig Dispense Refill   • amLODIPine (NORVASC) 5 mg tablet TAKE 1 TABLET BY MOUTH TWICE DAILY 180 tablet 0   • amoxicillin-clavulanate (AUGMENTIN) 875-125 mg per tablet Take 1 tablet by mouth every 12 (twelve) hours for 7 days 14 tablet 0   • Apremilast 30 MG TABS 30 mg Every 12 hours      • aspirin 81 MG tablet Take 81 mg by mouth daily      • Cholecalciferol (VITAMIN D3) 5000 units TABS 5,000 Units Daily      • diphenhydrAMINE (BENADRYL) 25 mg capsule Take 25 mg by mouth every 12 (twelve) hours as needed for itching      • Fluocinolone Acetonide Scalp 0 01 % OIL      • halobetasol (ULTRAVATE) 0 05 % ointment       • hydrocortisone 2 5 % ointment      • hydroxyurea (HYDREA) 500 mg capsule Take 1 capsule (500 mg total) by mouth daily 90 capsule 3   • losartan (COZAAR) 50 mg tablet TAKE 1 TABLET BY MOUTH EVERY DAY 90 tablet 0   • Probiotic Product (PROBIOTIC DAILY PO) Take 1 capsule by mouth daily     • rosuvastatin (CRESTOR) 5 mg tablet TAKE 1 TABLET BY MOUTH DAILY AT BEDTIME 90 tablet 0     No current facility-administered medications for this visit      _______________________________________________________________________  Review of Systems   Constitutional: Negative for chills, fatigue and fever  HENT: Negative for congestion, ear pain, rhinorrhea, sneezing and sore throat  Eyes: Negative for redness, itching and visual disturbance  Respiratory: Negative for cough, chest tightness and shortness of breath      Cardiovascular: Negative for "chest pain, palpitations and leg swelling  Gastrointestinal: Negative for abdominal pain, blood in stool, diarrhea, nausea and vomiting  Endocrine: Negative for cold intolerance and heat intolerance  Genitourinary: Negative for dysuria, frequency and urgency  Musculoskeletal: Negative for arthralgias, back pain and myalgias  Skin: Negative for color change and rash  Neurological: Negative for dizziness, weakness, light-headedness, numbness and headaches  Hematological: Does not bruise/bleed easily  Psychiatric/Behavioral: Negative for agitation, behavioral problems and confusion  Objective:  Vitals:    06/12/23 1038   BP: 108/58   BP Location: Left arm   Patient Position: Sitting   Cuff Size: Standard   Pulse: 78   Resp: 16   Temp: (!) 96 7 °F (35 9 °C)   TempSrc: Tympanic   SpO2: 98%   Weight: 53 3 kg (117 lb 6 4 oz)   Height: 5' 3\" (1 6 m)     Body mass index is 20 8 kg/m²  Physical Exam  Vitals and nursing note reviewed  Constitutional:       General: She is not in acute distress  Appearance: Normal appearance  She is not ill-appearing, toxic-appearing or diaphoretic  HENT:      Head: Normocephalic and atraumatic  Right Ear: Tympanic membrane normal       Left Ear: Tympanic membrane normal       Nose: Nose normal  No congestion  Mouth/Throat:      Mouth: Mucous membranes are moist    Eyes:      General: No scleral icterus  Right eye: No discharge  Left eye: No discharge  Extraocular Movements: Extraocular movements intact  Conjunctiva/sclera: Conjunctivae normal       Pupils: Pupils are equal, round, and reactive to light  Neck:      Comments: Enlargement of left submandibular gland with mild tenderness  Cardiovascular:      Rate and Rhythm: Normal rate and regular rhythm  Pulses: Normal pulses  Heart sounds: Normal heart sounds  No murmur heard  No gallop     Pulmonary:      Effort: Pulmonary effort is normal  No respiratory " distress  Breath sounds: Normal breath sounds  No wheezing, rhonchi or rales  Abdominal:      General: Abdomen is flat  Bowel sounds are normal  There is no distension  Palpations: Abdomen is soft  Tenderness: There is no abdominal tenderness  There is no guarding  Musculoskeletal:         General: No swelling or tenderness  Normal range of motion  Cervical back: Normal range of motion and neck supple  No rigidity  Lymphadenopathy:      Cervical: No cervical adenopathy  Skin:     General: Skin is warm  Capillary Refill: Capillary refill takes 2 to 3 seconds  Coloration: Skin is not jaundiced  Findings: No bruising or rash  Neurological:      General: No focal deficit present  Mental Status: She is alert and oriented to person, place, and time  Mental status is at baseline        Gait: Gait normal    Psychiatric:         Mood and Affect: Mood normal

## 2023-06-22 DIAGNOSIS — I10 HTN (HYPERTENSION), BENIGN: ICD-10-CM

## 2023-06-22 DIAGNOSIS — E78.2 MIXED HYPERLIPIDEMIA: ICD-10-CM

## 2023-06-22 RX ORDER — LOSARTAN POTASSIUM 50 MG/1
TABLET ORAL
Qty: 90 TABLET | Refills: 0 | Status: SHIPPED | OUTPATIENT
Start: 2023-06-22

## 2023-06-22 RX ORDER — ROSUVASTATIN CALCIUM 5 MG/1
TABLET, COATED ORAL
Qty: 90 TABLET | Refills: 0 | Status: SHIPPED | OUTPATIENT
Start: 2023-06-22

## 2023-06-22 RX ORDER — AMLODIPINE BESYLATE 5 MG/1
TABLET ORAL
Qty: 180 TABLET | Refills: 0 | Status: SHIPPED | OUTPATIENT
Start: 2023-06-22

## 2023-07-31 ENCOUNTER — APPOINTMENT (OUTPATIENT)
Dept: LAB | Facility: CLINIC | Age: 73
End: 2023-07-31
Payer: MEDICARE

## 2023-07-31 DIAGNOSIS — E78.2 MIXED HYPERLIPIDEMIA: ICD-10-CM

## 2023-07-31 DIAGNOSIS — E55.9 VITAMIN D DEFICIENCY: ICD-10-CM

## 2023-07-31 DIAGNOSIS — I10 HTN (HYPERTENSION), BENIGN: ICD-10-CM

## 2023-07-31 LAB
25(OH)D3 SERPL-MCNC: 51.4 NG/ML (ref 30–100)
ALBUMIN SERPL BCP-MCNC: 4.4 G/DL (ref 3.5–5)
ALP SERPL-CCNC: 38 U/L (ref 34–104)
ALT SERPL W P-5'-P-CCNC: 10 U/L (ref 7–52)
ANION GAP SERPL CALCULATED.3IONS-SCNC: 7 MMOL/L
AST SERPL W P-5'-P-CCNC: 14 U/L (ref 13–39)
BACTERIA UR QL AUTO: ABNORMAL /HPF
BASOPHILS # BLD AUTO: 0.04 THOUSANDS/ÂΜL (ref 0–0.1)
BASOPHILS NFR BLD AUTO: 1 % (ref 0–1)
BILIRUB SERPL-MCNC: 0.46 MG/DL (ref 0.2–1)
BILIRUB UR QL STRIP: NEGATIVE
BUN SERPL-MCNC: 18 MG/DL (ref 5–25)
CALCIUM SERPL-MCNC: 9.5 MG/DL (ref 8.4–10.2)
CHLORIDE SERPL-SCNC: 106 MMOL/L (ref 96–108)
CHOLEST SERPL-MCNC: 145 MG/DL
CLARITY UR: CLEAR
CO2 SERPL-SCNC: 29 MMOL/L (ref 21–32)
COLOR UR: YELLOW
CREAT SERPL-MCNC: 1.1 MG/DL (ref 0.6–1.3)
EOSINOPHIL # BLD AUTO: 0.11 THOUSAND/ÂΜL (ref 0–0.61)
EOSINOPHIL NFR BLD AUTO: 3 % (ref 0–6)
ERYTHROCYTE [DISTWIDTH] IN BLOOD BY AUTOMATED COUNT: 14.6 % (ref 11.6–15.1)
GFR SERPL CREATININE-BSD FRML MDRD: 50 ML/MIN/1.73SQ M
GLUCOSE P FAST SERPL-MCNC: 86 MG/DL (ref 65–99)
GLUCOSE UR STRIP-MCNC: NEGATIVE MG/DL
HCT VFR BLD AUTO: 38 % (ref 34.8–46.1)
HDLC SERPL-MCNC: 66 MG/DL
HGB BLD-MCNC: 12.4 G/DL (ref 11.5–15.4)
HGB UR QL STRIP.AUTO: ABNORMAL
HYALINE CASTS #/AREA URNS LPF: ABNORMAL /LPF
IMM GRANULOCYTES # BLD AUTO: 0.02 THOUSAND/UL (ref 0–0.2)
IMM GRANULOCYTES NFR BLD AUTO: 1 % (ref 0–2)
KETONES UR STRIP-MCNC: NEGATIVE MG/DL
LDLC SERPL CALC-MCNC: 55 MG/DL (ref 0–100)
LEUKOCYTE ESTERASE UR QL STRIP: ABNORMAL
LYMPHOCYTES # BLD AUTO: 0.92 THOUSANDS/ÂΜL (ref 0.6–4.47)
LYMPHOCYTES NFR BLD AUTO: 22 % (ref 14–44)
MCH RBC QN AUTO: 35.7 PG (ref 26.8–34.3)
MCHC RBC AUTO-ENTMCNC: 32.6 G/DL (ref 31.4–37.4)
MCV RBC AUTO: 110 FL (ref 82–98)
MONOCYTES # BLD AUTO: 0.36 THOUSAND/ÂΜL (ref 0.17–1.22)
MONOCYTES NFR BLD AUTO: 9 % (ref 4–12)
MUCOUS THREADS UR QL AUTO: ABNORMAL
NEUTROPHILS # BLD AUTO: 2.74 THOUSANDS/ÂΜL (ref 1.85–7.62)
NEUTS SEG NFR BLD AUTO: 64 % (ref 43–75)
NITRITE UR QL STRIP: NEGATIVE
NON-SQ EPI CELLS URNS QL MICRO: ABNORMAL /HPF
NRBC BLD AUTO-RTO: 0 /100 WBCS
PH UR STRIP.AUTO: 5.5 [PH]
PLATELET # BLD AUTO: 290 THOUSANDS/UL (ref 149–390)
PMV BLD AUTO: 9.1 FL (ref 8.9–12.7)
POTASSIUM SERPL-SCNC: 3.8 MMOL/L (ref 3.5–5.3)
PROT SERPL-MCNC: 6.7 G/DL (ref 6.4–8.4)
PROT UR STRIP-MCNC: ABNORMAL MG/DL
RBC # BLD AUTO: 3.47 MILLION/UL (ref 3.81–5.12)
RBC #/AREA URNS AUTO: ABNORMAL /HPF
SODIUM SERPL-SCNC: 142 MMOL/L (ref 135–147)
SP GR UR STRIP.AUTO: 1.02 (ref 1–1.03)
TRIGL SERPL-MCNC: 122 MG/DL
UROBILINOGEN UR STRIP-ACNC: <2 MG/DL
WBC # BLD AUTO: 4.19 THOUSAND/UL (ref 4.31–10.16)
WBC #/AREA URNS AUTO: ABNORMAL /HPF

## 2023-07-31 PROCEDURE — 80061 LIPID PANEL: CPT

## 2023-07-31 PROCEDURE — 82306 VITAMIN D 25 HYDROXY: CPT

## 2023-07-31 PROCEDURE — 81001 URINALYSIS AUTO W/SCOPE: CPT

## 2023-07-31 PROCEDURE — 36415 COLL VENOUS BLD VENIPUNCTURE: CPT

## 2023-08-14 ENCOUNTER — OFFICE VISIT (OUTPATIENT)
Dept: FAMILY MEDICINE CLINIC | Facility: CLINIC | Age: 73
End: 2023-08-14
Payer: MEDICARE

## 2023-08-14 VITALS
HEART RATE: 61 BPM | BODY MASS INDEX: 20.73 KG/M2 | TEMPERATURE: 97.6 F | HEIGHT: 63 IN | WEIGHT: 117 LBS | SYSTOLIC BLOOD PRESSURE: 98 MMHG | OXYGEN SATURATION: 99 % | DIASTOLIC BLOOD PRESSURE: 60 MMHG

## 2023-08-14 DIAGNOSIS — I10 HTN (HYPERTENSION), BENIGN: ICD-10-CM

## 2023-08-14 DIAGNOSIS — Z00.00 MEDICARE ANNUAL WELLNESS VISIT, SUBSEQUENT: Primary | ICD-10-CM

## 2023-08-14 DIAGNOSIS — D47.3 ESSENTIAL THROMBOCYTOSIS (HCC): ICD-10-CM

## 2023-08-14 DIAGNOSIS — E78.2 MIXED HYPERLIPIDEMIA: ICD-10-CM

## 2023-08-14 DIAGNOSIS — N18.31 STAGE 3A CHRONIC KIDNEY DISEASE (CKD) (HCC): ICD-10-CM

## 2023-08-14 PROCEDURE — G0439 PPPS, SUBSEQ VISIT: HCPCS

## 2023-08-14 PROCEDURE — 99213 OFFICE O/P EST LOW 20 MIN: CPT

## 2023-08-14 NOTE — PROGRESS NOTES
Assessment and Plan:     Problem List Items Addressed This Visit        Cardiovascular and Mediastinum    HTN (hypertension), benign     Under control. Continue current medication. We will re-evaluate at next office visit. Hematopoietic and Hemostatic    Essential thrombocytosis (HCC)     Under control. Continue current medication. We will re-evaluate at next office visit. Patient has been followed by hematologist            Genitourinary    Stage 3a chronic kidney disease (CKD) ([address])     Lab Results   Component Value Date    EGFR 50 07/31/2023    EGFR 51 05/01/2023    EGFR 47 03/16/2023    CREATININE 1.10 07/31/2023    CREATININE 1.07 05/01/2023    CREATININE 1.16 03/16/2023   creatinine and GFR stable   Will need periodic BMP  Avoid NSAIDs like ibuprofen Aleve Advil etc.  Avoid high potassium diet. We will continue to monitor                 Other    Mixed hyperlipidemia     Under control. Continue current medication. We will re-evaluate at next office visit. Other Visit Diagnoses     Medicare annual wellness visit, subsequent    -  Primary           Preventive health issues were discussed with patient, and age appropriate screening tests were ordered as noted in patient's After Visit Summary. Personalized health advice and appropriate referrals for health education or preventive services given if needed, as noted in patient's After Visit Summary. History of Present Illness:     Patient presents for a Medicare Wellness Visit    Patient here for review of chronic medical problems and  the labs and imaging if it is applicable. Currently has no specific complaints other than mentioned in the review of systems  Denies chest pain, SOB, cough, abdominal pain, nausea, vomiting, fever, chills, lightheadedness, dizziness,headache, tingling or numbness. No bowel or bladder problem.        Patient Care Team:  Roger Rubin MD as PCP - General (Internal Medicine)     Review of Systems:     Review of Systems   Constitutional: Negative for chills, fatigue and fever. HENT: Negative for congestion, ear pain, rhinorrhea, sneezing and sore throat. Eyes: Negative for redness, itching and visual disturbance. Respiratory: Negative for cough, chest tightness and shortness of breath. Cardiovascular: Negative for chest pain, palpitations and leg swelling. Gastrointestinal: Negative for abdominal pain, blood in stool, diarrhea, nausea and vomiting. Endocrine: Negative for cold intolerance and heat intolerance. Genitourinary: Negative for dysuria, frequency and urgency. Musculoskeletal: Negative for arthralgias, back pain and myalgias. Skin: Negative for color change and rash. Neurological: Negative for dizziness, weakness, light-headedness, numbness and headaches. Hematological: Does not bruise/bleed easily. Psychiatric/Behavioral: Negative for agitation, behavioral problems and confusion.         Problem List:     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      Past Medical and Surgical History:     Past Medical History:   Diagnosis Date   • Allergic 2022    Allergic to neomiacin and cephalexin   • Essential thrombocytosis ([address])     controlled with medication   • Hyperlipidemia    • Hypertension    • Nodular goiter    • Psoriasis      Past Surgical History:   Procedure Laterality Date   • APPENDECTOMY     • COLONOSCOPY  10/31/2018   • DXA PROCEDURE (HISTORICAL)  04/21/2017   • MAMMO (HISTORICAL)      8-24-18   • MAMMO NEEDLE LOCALIZATION RIGHT (ALL INC) Right 07/13/2009   • SKIN LESION EXCISION      bridge of nose      Family History:     Family History   Problem Relation Age of Onset   • Stroke Mother    • Depression Mother            • Hypertension Mother    • Hearing loss Mother    • Tuberculosis Father    • Cancer Brother         lung   • Tuberculosis Brother    • Coronary artery disease Brother    • Hypertension Brother    • No Known Problems Daughter    • No Known Problems Daughter    • No Known Problems Maternal Grandmother    • No Known Problems Maternal Grandfather    • No Known Problems Paternal Grandmother    • No Known Problems Paternal Grandfather    • No Known Problems Maternal Aunt    • No Known Problems Maternal Aunt    • No Known Problems Maternal Aunt    • No Known Problems Maternal Aunt    • No Known Problems Paternal Aunt    • Cancer Brother         Throat canver      Social History:     Social History     Socioeconomic History   • Marital status:      Spouse name: None   • Number of children: None   • Years of education: None   • Highest education level: None   Occupational History   • None   Tobacco Use   • Smoking status: Former     Packs/day: 1.00     Years: 25.00     Total pack years: 25.00     Types: Cigarettes   • Smokeless tobacco: Never   • Tobacco comments:     Quit    Vaping Use   • Vaping Use: Never used   Substance and Sexual Activity   • Alcohol use: Yes     Alcohol/week: 5.0 standard drinks of alcohol     Types: 5 Glasses of wine per week   • Drug use: Never   • Sexual activity: Not Currently     Birth control/protection: Post-menopausal   Other Topics Concern   • None   Social History Narrative   • None     Social Determinants of Health     Financial Resource Strain: Low Risk  (2023)    Overall Financial Resource Strain (CARDIA)    • Difficulty of Paying Living Expenses: Not hard at all   Food Insecurity: Not on file   Transportation Needs: No Transportation Needs (2023)    PRAPARE - Transportation    • Lack of Transportation (Medical): No    • Lack of Transportation (Non-Medical):  No   Physical Activity: Not on file   Stress: Not on file   Social Connections: Not on file   Intimate Partner Violence: Not on file   Housing Stability: Not on file      Medications and Allergies:     Current Outpatient Medications   Medication Sig Dispense Refill   • amLODIPine (NORVASC) 5 mg tablet TAKE 1 TABLET BY MOUTH TWICE DAILY 180 tablet 0   • Apremilast 30 MG TABS 30 mg Every 12 hours      • aspirin 81 MG tablet Take 81 mg by mouth daily      • Cholecalciferol (VITAMIN D3) 5000 units TABS 5,000 Units Daily      • diphenhydrAMINE (BENADRYL) 25 mg capsule Take 25 mg by mouth every 12 (twelve) hours as needed for itching      • Fluocinolone Acetonide Scalp 0.01 % OIL      • halobetasol (ULTRAVATE) 0.05 % ointment       • hydrocortisone 2.5 % ointment      • hydroxyurea (HYDREA) 500 mg capsule Take 1 capsule (500 mg total) by mouth daily 90 capsule 3   • losartan (COZAAR) 50 mg tablet TAKE 1 TABLET BY MOUTH EVERY DAY 90 tablet 0   • Probiotic Product (PROBIOTIC DAILY PO) Take 1 capsule by mouth daily     • rosuvastatin (CRESTOR) 5 mg tablet TAKE 1 TABLET BY MOUTH DAILY AT BEDTIME 90 tablet 0     No current facility-administered medications for this visit. Allergies   Allergen Reactions   • Keflex [Cephalexin] Rash   • Neomycin-Polymyxin-Dexameth Eye Swelling      Immunizations:     Immunization History   Administered Date(s) Administered   • COVID-19 PFIZER VACCINE 0.3 ML IM 02/17/2021, 03/09/2021, 11/27/2021   • Influenza, high dose seasonal 0.7 mL 10/12/2022   • Pneumococcal Conjugate 13-Valent 01/16/2017   • Pneumococcal Polysaccharide PPV23 01/18/2018      Health Maintenance:         Topic Date Due   • DXA SCAN  04/21/2022   • Breast Cancer Screening: Mammogram  08/02/2023   • Colorectal Cancer Screening  08/13/2029   • Hepatitis C Screening  Completed         Topic Date Due   • Influenza Vaccine (1) 09/01/2023      Medicare Screening Tests and Risk Assessments:     Marc Velazquez is here for her Subsequent Wellness visit. Health Risk Assessment:   Patient rates overall health as good. Patient feels that their physical health rating is same. Patient is satisfied with their life. Eyesight was rated as slightly worse. Hearing was rated as same. Patient feels that their emotional and mental health rating is same. Patients states they are sometimes angry. Patient states they are sometimes unusually tired/fatigued. Pain experienced in the last 7 days has been none. Patient states that she has experienced no weight loss or gain in last 6 months. Fall Risk Screening: In the past year, patient has experienced: no history of falling in past year      Urinary Incontinence Screening:   Patient has not leaked urine accidently in the last six months. Home Safety:  Patient does not have trouble with stairs inside or outside of their home. Patient has working smoke alarms and has working carbon monoxide detector. Home safety hazards include: none. Nutrition:   Current diet is Regular and Frequent junk food. Medications:   Patient is not currently taking any over-the-counter supplements. Patient is able to manage medications. Activities of Daily Living (ADLs)/Instrumental Activities of Daily Living (IADLs):   Walk and transfer into and out of bed and chair?: Yes  Dress and groom yourself?: Yes    Bathe or shower yourself?: Yes    Feed yourself? Yes  Do your laundry/housekeeping?: Yes  Manage your money, pay your bills and track your expenses?: Yes  Make your own meals?: Yes    Do your own shopping?: Yes    Previous Hospitalizations:   Any hospitalizations or ED visits within the last 12 months?: No      Advance Care Planning:   Living will: Yes    Durable POA for healthcare:  Yes    Advanced directive: Yes      Cognitive Screening:   Provider or family/friend/caregiver concerned regarding cognition?: No    PREVENTIVE SCREENINGS      Cardiovascular Screening:    General: Screening Not Indicated and History Lipid Disorder      Diabetes Screening:     General: Screening Current      Colorectal Cancer Screening:     General: Screening Current      Breast Cancer Screening:     General: Screening Current      Cervical Cancer Screening:    General: Screening Not Indicated      Hepatitis C Screening:    General: Screening Current    Screening, Brief Intervention, and Referral to Treatment (SBIRT)    Screening  Typical number of drinks in a day: 0  Typical number of drinks in a week: 0  Interpretation: Low risk drinking behavior. AUDIT-C Screenin) How often did you have a drink containing alcohol in the past year? 2 to 4 times a month  2) How many drinks did you have on a typical day when you were drinking in the past year? 1 to 2  3) How often did you have 6 or more drinks on one occasion in the past year? never    AUDIT-C Score: 2  Interpretation: Score 0-2 (female): Negative screen for alcohol misuse    Single Item Drug Screening:  How often have you used an illegal drug (including marijuana) or a prescription medication for non-medical reasons in the past year? never    Single Item Drug Screen Score: 0  Interpretation: Negative screen for possible drug use disorder    No results found. Physical Exam:     BP 98/60 (BP Location: Left arm, Patient Position: Sitting, Cuff Size: Adult)   Pulse 61   Temp 97.6 °F (36.4 °C) (Tympanic)   Ht 5' 3" (1.6 m)   Wt 53.1 kg (117 lb)   SpO2 99%   BMI 20.73 kg/m²     Physical Exam  Vitals and nursing note reviewed. Constitutional:       General: She is not in acute distress. Appearance: Normal appearance. She is well-developed. HENT:      Head: Normocephalic and atraumatic. Right Ear: External ear normal. There is no impacted cerumen. Left Ear: External ear normal. There is no impacted cerumen. Nose: Nose normal.      Mouth/Throat:      Mouth: Mucous membranes are moist.      Pharynx: Oropharynx is clear. Eyes:      Conjunctiva/sclera: Conjunctivae normal.   Cardiovascular:      Rate and Rhythm: Normal rate and regular rhythm. Pulses: Normal pulses. Heart sounds: Normal heart sounds. No murmur heard. Pulmonary:      Effort: Pulmonary effort is normal. No respiratory distress. Breath sounds: Normal breath sounds. No wheezing. Abdominal:      General: Abdomen is flat. Bowel sounds are normal.      Palpations: Abdomen is soft. Tenderness: There is no abdominal tenderness. Musculoskeletal:         General: No tenderness. Normal range of motion. Cervical back: Normal range of motion and neck supple. No rigidity. Right lower leg: No edema. Left lower leg: No edema. Lymphadenopathy:      Cervical: No cervical adenopathy. Skin:     General: Skin is warm and dry. Capillary Refill: Capillary refill takes 2 to 3 seconds. Coloration: Skin is not jaundiced. Findings: No bruising. Neurological:      General: No focal deficit present. Mental Status: She is alert and oriented to person, place, and time. Mental status is at baseline.       Gait: Gait normal.   Psychiatric:         Mood and Affect: Mood normal.         Behavior: Behavior normal.          Nery Ferris MD

## 2023-08-14 NOTE — ASSESSMENT & PLAN NOTE
Lab Results   Component Value Date    EGFR 50 07/31/2023    EGFR 51 05/01/2023    EGFR 47 03/16/2023    CREATININE 1.10 07/31/2023    CREATININE 1.07 05/01/2023    CREATININE 1.16 03/16/2023   creatinine and GFR stable   Will need periodic BMP  Avoid NSAIDs like ibuprofen Aleve Advil etc.  Avoid high potassium diet.   We will continue to monitor

## 2023-08-14 NOTE — ASSESSMENT & PLAN NOTE
Under control. Continue current medication. We will re-evaluate at next office visit.   Patient has been followed by hematologist

## 2023-08-14 NOTE — PATIENT INSTRUCTIONS
Medicare Preventive Visit Patient Instructions  Thank you for completing your Welcome to Medicare Visit or Medicare Annual Wellness Visit today. Your next wellness visit will be due in one year (8/14/2024). The screening/preventive services that you may require over the next 5-10 years are detailed below. Some tests may not apply to you based off risk factors and/or age. Screening tests ordered at today's visit but not completed yet may show as past due. Also, please note that scanned in results may not display below. Preventive Screenings:  Service Recommendations Previous Testing/Comments   Colorectal Cancer Screening  * Colonoscopy    * Fecal Occult Blood Test (FOBT)/Fecal Immunochemical Test (FIT)  * Fecal DNA/Cologuard Test  * Flexible Sigmoidoscopy Age: 43-73 years old   Colonoscopy: every 10 years (may be performed more frequently if at higher risk)  OR  FOBT/FIT: every 1 year  OR  Cologuard: every 3 years  OR  Sigmoidoscopy: every 5 years  Screening may be recommended earlier than age 39 if at higher risk for colorectal cancer. Also, an individualized decision between you and your healthcare provider will decide whether screening between the ages of 77-80 would be appropriate. Colonoscopy: 08/15/2022  FOBT/FIT: Not on file  Cologuard: Not on file  Sigmoidoscopy: Not on file          Breast Cancer Screening Age: 36 years old  Frequency: every 1-2 years  Not required if history of left and right mastectomy Mammogram: 08/02/2022        Cervical Cancer Screening Between the ages of 21-29, pap smear recommended once every 3 years. Between the ages of 32-69, can perform pap smear with HPV co-testing every 5 years.    Recommendations may differ for women with a history of total hysterectomy, cervical cancer, or abnormal pap smears in past. Pap Smear: Not on file        Hepatitis C Screening Once for adults born between [address] [place] [address]  More frequently in patients at high risk for Hepatitis C Hep C Antibody: 12/31/2015        Diabetes Screening 1-2 times per year if you're at risk for diabetes or have pre-diabetes Fasting glucose: 86 mg/dL (7/31/2023)  A1C: No results in last 5 years (No results in last 5 years)      Cholesterol Screening Once every 5 years if you don't have a lipid disorder. May order more often based on risk factors. Lipid panel: 07/31/2023          Other Preventive Screenings Covered by Medicare:  1. Abdominal Aortic Aneurysm (AAA) Screening: covered once if your at risk. You're considered to be at risk if you have a family history of AAA. 2. Lung Cancer Screening: covers low dose CT scan once per year if you meet all of the following conditions: (1) Age 48-67; (2) No signs or symptoms of lung cancer; (3) Current smoker or have quit smoking within the last 15 years; (4) You have a tobacco smoking history of at least 20 pack years (packs per day multiplied by number of years you smoked); (5) You get a written order from a healthcare provider. 3. Glaucoma Screening: covered annually if you're considered high risk: (1) You have diabetes OR (2) Family history of glaucoma OR (3)  aged 48 and older OR (3)  American aged 72 and older  3. Osteoporosis Screening: covered every 2 years if you meet one of the following conditions: (1) You're estrogen deficient and at risk for osteoporosis based off medical history and other findings; (2) Have a vertebral abnormality; (3) On glucocorticoid therapy for more than 3 months; (4) Have primary hyperparathyroidism; (5) On osteoporosis medications and need to assess response to drug therapy. · Last bone density test (DXA Scan): 04/21/2017. 5. HIV Screening: covered annually if you're between the age of 14-79. Also covered annually if you are younger than 13 and older than 72 with risk factors for HIV infection. For pregnant patients, it is covered up to 3 times per pregnancy.     Immunizations:  Immunization Recommendations   Influenza Vaccine Annual influenza vaccination during flu season is recommended for all persons aged >= 6 months who do not have contraindications   Pneumococcal Vaccine   * Pneumococcal conjugate vaccine = PCV13 (Prevnar 13), PCV15 (Vaxneuvance), PCV20 (Prevnar 20)  * Pneumococcal polysaccharide vaccine = PPSV23 (Pneumovax) Adults 20-63 years old: 1-3 doses may be recommended based on certain risk factors  Adults 72 years old: 1-2 doses may be recommended based off what pneumonia vaccine you previously received   Hepatitis B Vaccine 3 dose series if at intermediate or high risk (ex: diabetes, end stage renal disease, liver disease)   Tetanus (Td) Vaccine - COST NOT COVERED BY MEDICARE PART B Following completion of primary series, a booster dose should be given every 10 years to maintain immunity against tetanus. Td may also be given as tetanus wound prophylaxis. Tdap Vaccine - COST NOT COVERED BY MEDICARE PART B Recommended at least once for all adults. For pregnant patients, recommended with each pregnancy. Shingles Vaccine (Shingrix) - COST NOT COVERED BY MEDICARE PART B  2 shot series recommended in those aged 48 and above     Health Maintenance Due:      Topic Date Due   • DXA SCAN  04/21/2022   • Breast Cancer Screening: Mammogram  08/02/2023   • Colorectal Cancer Screening  08/13/2029   • Hepatitis C Screening  Completed     Immunizations Due:      Topic Date Due   • Influenza Vaccine (1) 09/01/2023     Advance Directives   What are advance directives? Advance directives are legal documents that state your wishes and plans for medical care. These plans are made ahead of time in case you lose your ability to make decisions for yourself. Advance directives can apply to any medical decision, such as the treatments you want, and if you want to donate organs. What are the types of advance directives? There are many types of advance directives, and each state has rules about how to use them.  You may choose a combination of any of the following:  · Living will: This is a written record of the treatment you want. You can also choose which treatments you do not want, which to limit, and which to stop at a certain time. This includes surgery, medicine, IV fluid, and tube feedings. · Durable power of  for healthcare [facility]): This is a written record that states who you want to make healthcare choices for you when you are unable to make them for yourself. This person, called a proxy, is usually a family member or a friend. You may choose more than 1 proxy. · Do not resuscitate (DNR) order:  A DNR order is used in case your heart stops beating or you stop breathing. It is a request not to have certain forms of treatment, such as CPR. A DNR order may be included in other types of advance directives. · Medical directive: This covers the care that you want if you are in a coma, near death, or unable to make decisions for yourself. You can list the treatments you want for each condition. Treatment may include pain medicine, surgery, blood transfusions, dialysis, IV or tube feedings, and a ventilator (breathing machine). · Values history: This document has questions about your views, beliefs, and how you feel and think about life. This information can help others choose the care that you would choose. Why are advance directives important? An advance directive helps you control your care. Although spoken wishes may be used, it is better to have your wishes written down. Spoken wishes can be misunderstood, or not followed. Treatments may be given even if you do not want them. An advance directive may make it easier for your family to make difficult choices about your care. © Copyright [employer] 2018 Information is for End User's use only and may not be sold, redistributed or otherwise used for commercial purposes.  All illustrations and images included in CareNotes® are the copyrighted property of [employer] or [facility]

## 2023-08-18 ENCOUNTER — OFFICE VISIT (OUTPATIENT)
Dept: OBGYN CLINIC | Facility: CLINIC | Age: 73
End: 2023-08-18
Payer: MEDICARE

## 2023-08-18 VITALS
DIASTOLIC BLOOD PRESSURE: 78 MMHG | WEIGHT: 115 LBS | SYSTOLIC BLOOD PRESSURE: 136 MMHG | BODY MASS INDEX: 20.38 KG/M2 | HEIGHT: 63 IN

## 2023-08-18 DIAGNOSIS — L02.215 PERINEAL ABSCESS: Primary | ICD-10-CM

## 2023-08-18 PROCEDURE — 99202 OFFICE O/P NEW SF 15 MIN: CPT | Performed by: OBSTETRICS & GYNECOLOGY

## 2023-08-18 PROCEDURE — 87186 SC STD MICRODIL/AGAR DIL: CPT | Performed by: OBSTETRICS & GYNECOLOGY

## 2023-08-18 PROCEDURE — 87075 CULTR BACTERIA EXCEPT BLOOD: CPT | Performed by: OBSTETRICS & GYNECOLOGY

## 2023-08-18 PROCEDURE — 87077 CULTURE AEROBIC IDENTIFY: CPT | Performed by: OBSTETRICS & GYNECOLOGY

## 2023-08-18 PROCEDURE — 87205 SMEAR GRAM STAIN: CPT | Performed by: OBSTETRICS & GYNECOLOGY

## 2023-08-18 PROCEDURE — 87070 CULTURE OTHR SPECIMN AEROBIC: CPT | Performed by: OBSTETRICS & GYNECOLOGY

## 2023-08-18 PROCEDURE — 46050 I&D PERIANAL ABSCESS SUPFC: CPT | Performed by: OBSTETRICS & GYNECOLOGY

## 2023-08-18 NOTE — PROGRESS NOTES
Assessment/Plan:     Diagnoses and all orders for this visit:    Perineal abscess  -     Wound culture and Gram stain  -     Cancel: Anaerobic culture and Gram stain  -     Anaerobic culture and Gram stain      66-year-old female  Left perineal abscess  Plan  Incision and drain  Warm compress  Antibiotics Augmentin  Return to office for annual exam if symptom is worse need to go to the emergency room for possible IV antibiotics    Subjective:      Patient ID: Tali Groves is a 67 y.o. female. HPI  66-year-old female present to the office today secondary to new onset of abscess on her left perineum area started last week getting worse causing pain and discomfort denies any vaginal itching odor or discharge patient is not sexually active  The following portions of the patient's history were reviewed and updated as appropriate: allergies, current medications, past family history, past medical history, past social history, past surgical history and problem list.    Review of Systems      Objective:      /78 (BP Location: Left arm, Patient Position: Sitting, Cuff Size: Adult)   Ht 5' 3" (1.6 m)   Wt 52.2 kg (115 lb)   BMI 20.37 kg/m²          Physical Exam  Genitourinary:         Comments: Abscess noted at the marked area with fluctuant area in the middle part        Incision and drain    Date/Time: 2023 11:00 AM    Performed by: Tamica Gordon MD  Authorized by: Tamica Gordon MD  Universal Protocol:  Procedure performed by:  Risks and benefits: risks, benefits and alternatives were discussed  Consent given by: patient  Time out: Immediately prior to procedure a "time out" was called to verify the correct patient, procedure, equipment, support staff and site/side marked as required.   Patient understanding: patient states understanding of the procedure being performed  Patient consent: the patient's understanding of the procedure matches consent given  Procedure consent: procedure consent matches procedure scheduled  Patient identity confirmed: verbally with patient      Patient location:  Clinic  Location:     Type:  Abscess    Size:  5cm    Location:  Anogenital    Anogenital location:  Perianal  Pre-procedure details:     Skin preparation:  Betadine  Anesthesia (see MAR for exact dosages): Anesthesia method:  Local infiltration    Local anesthetic:  Lidocaine 1% WITH epi  Procedure details:     Complexity:  Simple    Needle aspiration: no      Incision types:  Stab incision    Scalpel blade:  11    Approach:  Puncture    Incision depth:  Subcutaneous    Wound management:  Irrigated with saline    Drainage:  Serosanguinous    Drainage amount: Moderate    Wound treatment:  Wound left open    Packing materials:  None  Post-procedure details:     Patient tolerance of procedure:   Tolerated well, no immediate complications

## 2023-08-20 LAB
BACTERIA SPEC ANAEROBE CULT: NORMAL
BACTERIA WND AEROBE CULT: ABNORMAL
BACTERIA WND AEROBE CULT: ABNORMAL
GRAM STN SPEC: ABNORMAL

## 2023-08-21 DIAGNOSIS — L02.215 PERINEAL ABSCESS: Primary | ICD-10-CM

## 2023-08-21 RX ORDER — SULFAMETHOXAZOLE AND TRIMETHOPRIM 800; 160 MG/1; MG/1
1 TABLET ORAL EVERY 12 HOURS SCHEDULED
Qty: 14 TABLET | Refills: 0 | Status: SHIPPED | OUTPATIENT
Start: 2023-08-21 | End: 2023-08-28

## 2023-08-31 ENCOUNTER — APPOINTMENT (OUTPATIENT)
Dept: LAB | Facility: HOSPITAL | Age: 73
End: 2023-08-31
Payer: MEDICARE

## 2023-09-05 ENCOUNTER — OFFICE VISIT (OUTPATIENT)
Dept: HEMATOLOGY ONCOLOGY | Facility: CLINIC | Age: 73
End: 2023-09-05
Payer: MEDICARE

## 2023-09-05 VITALS
HEIGHT: 63 IN | OXYGEN SATURATION: 99 % | HEART RATE: 59 BPM | DIASTOLIC BLOOD PRESSURE: 76 MMHG | BODY MASS INDEX: 20.55 KG/M2 | SYSTOLIC BLOOD PRESSURE: 118 MMHG | RESPIRATION RATE: 16 BRPM | WEIGHT: 116 LBS | TEMPERATURE: 98 F

## 2023-09-05 DIAGNOSIS — Z15.89 JAK2 V617F MUTATION: ICD-10-CM

## 2023-09-05 DIAGNOSIS — Z79.64 ENCOUNTER FOR MONITORING OF HYDROXYUREA THERAPY: ICD-10-CM

## 2023-09-05 DIAGNOSIS — D53.9 MACROCYTIC ANEMIA: ICD-10-CM

## 2023-09-05 DIAGNOSIS — D47.3 ESSENTIAL THROMBOCYTOSIS (HCC): Primary | ICD-10-CM

## 2023-09-05 DIAGNOSIS — Z51.81 ENCOUNTER FOR MONITORING OF HYDROXYUREA THERAPY: ICD-10-CM

## 2023-09-05 DIAGNOSIS — E46 PROTEIN-CALORIE MALNUTRITION, UNSPECIFIED SEVERITY (HCC): ICD-10-CM

## 2023-09-05 PROCEDURE — 99214 OFFICE O/P EST MOD 30 MIN: CPT

## 2023-09-05 NOTE — PROGRESS NOTES
[place] [id number]  Hematology Ambulatory Follow-Up  Love, 1950, [id number]  9/5/2023    Assessment/Plan:  1. Essential thrombocytosis ([address])  2. JAK2 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  3. Encounter for monitoring of hydroxyurea therapy  4. Macrocytic anemia  Ms. Cortes Henderson is a 59-year-old female seen in follow-up for JAK2 positive essential thrombocytosis. She has been on Hydrea since July 2020 with some minimal dose changes. She previously had to reduce her dose due to increased leukopenia and infections. She then returned 500 mg once daily and tolerated well but had to be dose reduced again in July 2023 for leukopenia. Currently she is taking 500 mg Monday through Friday and not taking any on Saturday and Sunday. She has been tolerating this well. She recently had an infection and required antibiotics for an abscess and now has some mild anemia with hemoglobin of 10.9, . White blood cell count and platelet count are within normal limits. To determine the trend in this she will repeat labs again 2 weeks later which will be next week and also assess for B12 and folate deficiency due to her increased fatigue. Based on these results we will determine if further dose adjustments need to be made. She will continue with monthly labs prior to her follow-up in 6 months. She knows to call the office with any new or worsening symptoms prior to this follow-up for sooner evaluation.      - Folate; Future  - Vitamin B12; Future  - Methylmalonic acid, serum; Future  - CBC and differential; Standing      The patient is scheduled for follow-up in approximately 6 months. Patient voiced agreement and understanding to the above. Patient knows to call the Hematology/Oncology office with any questions and concerns regarding the above.       Barrier(s) to care: None  The patient is able to self care.  ------------------------------------------------------------------------------------------------------    Chief Complaint   Patient presents with   • Follow-up       History of present illness:   Ruth Mauricio is a 80-year-old female with past medical history of hypertension, psoriatic arthritis, CKD stage III, hyperlipidemia, and latent TB. She is seen in follow-up for essential thrombocytosis. She has had an elevated platelet count dating back to January 2028 all of her other cell lines were within normal limits. Her highest platelet count was around 700,000. Myeloproliferative work-up demonstrated positive JAK2 mutation and she was started on 500 mg of Hydrea daily in July 2020. Due to leukopenia her dose was reduced to Monday, Wednesday, Friday. 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 Interval history: In July we decreased her dose to Monday through Friday 500 mg and off on Saturday Sunday due to leukopenia. She has been tolerating this dose well. She did have an abscess drained and was placed on antibiotics. Antibiotics had significant GI upset and had increased diarrhea, nausea, and decreased oral intake. She is currently back to her baseline and feeling much better though still does have some increased fatigue than previous follow-ups. Review of Systems   Constitutional: Positive for fatigue. Negative for activity change, appetite change, diaphoresis, fever and unexpected weight change. HENT: Negative for trouble swallowing and voice change. Eyes: Negative for photophobia and visual disturbance. Respiratory: Negative for cough, chest tightness and shortness of breath. Cardiovascular: Negative for chest pain, palpitations and leg swelling.    Gastrointestinal: Negative for abdominal distention, abdominal pain, anal bleeding, blood in stool, constipation, diarrhea, nausea and vomiting. Endocrine: Negative for cold intolerance and heat intolerance. Genitourinary: Negative for dysuria, hematuria and urgency. Musculoskeletal: Negative for arthralgias, back pain, gait problem, joint swelling and myalgias. Skin: Negative for pallor and rash. Neurological: Negative for dizziness, weakness, light-headedness and headaches. Hematological: Negative for adenopathy. Does not bruise/bleed easily. Psychiatric/Behavioral: Negative for confusion and sleep disturbance.        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       Past Medical History:   Diagnosis Date   • Allergic     Allergic to neomiacin and cephalexin   • Essential thrombocytosis ([address])     controlled with medication   • Hyperlipidemia    • Hypertension    • Nodular goiter    • Psoriasis        Past Surgical History:   Procedure Laterality Date   • APPENDECTOMY     • COLONOSCOPY  10/31/2018   • DXA PROCEDURE (HISTORICAL)  2017   • MAMMO (HISTORICAL)      18   • MAMMO NEEDLE LOCALIZATION RIGHT (ALL INC) Right 2009   • SKIN LESION EXCISION      bridge of nose       Family History   Problem Relation Age of Onset   • Stroke Mother    • Depression Mother            • Hypertension Mother    • Hearing loss Mother    • Tuberculosis Father    • Cancer Brother         lung   • Tuberculosis Brother    • Coronary artery disease Brother    • Hypertension Brother    • No Known Problems Daughter    • No Known Problems Daughter    • No Known Problems Maternal Grandmother    • No Known Problems Maternal Grandfather    • No Known Problems Paternal Grandmother    • No Known Problems Paternal Grandfather    • No Known Problems Maternal Aunt    • No Known Problems Maternal Aunt    • No Known Problems Maternal Aunt    • No Known Problems Maternal Aunt    • No Known Problems Paternal Aunt    • Cancer Brother         Throat canver       Social History     Socioeconomic History   • Marital status:      Spouse name: None   • Number of children: None   • Years of education: None   • Highest education level: None   Occupational History   • None   Tobacco Use   • Smoking status: Former     Packs/day: 1.00     Years: 25.00     Total pack years: 25.00     Types: Cigarettes   • Smokeless tobacco: Never   • Tobacco comments:     Quit 2010   Vaping Use   • Vaping Use: Never used   Substance and Sexual Activity   • Alcohol use: Not Currently   • Drug use: Never   • Sexual activity: Not Currently     Birth control/protection: Post-menopausal   Other Topics Concern   • None   Social History Narrative   • None     Social Determinants of Health     Financial Resource Strain: Low Risk  (8/14/2023)    Overall Financial Resource Strain (CARDIA)    • Difficulty of Paying Living Expenses: Not hard at all   Food Insecurity: Not on file   Transportation Needs: No Transportation Needs (8/14/2023)    PRAPARE - Transportation    • Lack of Transportation (Medical): No    • Lack of Transportation (Non-Medical):  No   Physical Activity: Not on file   Stress: Not on file   Social Connections: Not on file   Intimate Partner Violence: Not on file   Housing Stability: Not on file         Current Outpatient Medications:   •  amLODIPine (NORVASC) 5 mg tablet, TAKE 1 TABLET BY MOUTH TWICE DAILY, Disp: 180 tablet, Rfl: 0  •  Apremilast 30 MG TABS, 30 mg Every 12 hours , Disp: , Rfl:   •  aspirin 81 MG tablet, Take 81 mg by mouth daily , Disp: , Rfl:   •  Cholecalciferol (VITAMIN D3) 5000 units TABS, 5,000 Units Daily , Disp: , Rfl:   •  diphenhydrAMINE (BENADRYL) 25 mg capsule, Take 25 mg by mouth every 12 (twelve) hours as needed for itching , Disp: , Rfl:   •  Fluocinolone Acetonide Scalp 0.01 % OIL, , Disp: , Rfl:   •  halobetasol (ULTRAVATE) 0.05 % ointment,  , Disp: , Rfl:   •  hydrocortisone 2.5 % ointment, , Disp: , Rfl:   •  hydroxyurea (HYDREA) 500 mg capsule, Take 1 capsule (500 mg total) by mouth daily, Disp: 90 capsule, Rfl: 3  •  losartan (COZAAR) 50 mg tablet, TAKE 1 TABLET BY MOUTH EVERY DAY, Disp: 90 tablet, Rfl: 0  •  Probiotic Product (PROBIOTIC DAILY PO), Take 1 capsule by mouth daily, Disp: , Rfl:   •  rosuvastatin (CRESTOR) 5 mg tablet, TAKE 1 TABLET BY MOUTH DAILY AT BEDTIME, Disp: 90 tablet, Rfl: 0    Allergies   Allergen Reactions   • Keflex [Cephalexin] Rash   • Neomycin-Polymyxin-Dexameth Eye Swelling       Objective:  /76   Pulse 59   Temp 98 °F (36.7 °C)   Resp 16   Ht 5' 3" (1.6 m)   Wt 52.6 kg (116 lb)   SpO2 99%   BMI 20.55 kg/m²    Physical Exam  Constitutional:       General: She is not in acute distress. Appearance: Normal appearance. She is normal weight. She is not ill-appearing. HENT:      Head: Normocephalic and atraumatic. Eyes:      Extraocular Movements: Extraocular movements intact. Conjunctiva/sclera: Conjunctivae normal.      Pupils: Pupils are equal, round, and reactive to light. Cardiovascular:      Rate and Rhythm: Normal rate and regular rhythm. Pulses: Normal pulses. Heart sounds: Normal heart sounds. No murmur heard. Pulmonary:      Effort: Pulmonary effort is normal. No respiratory distress. Breath sounds: Normal breath sounds. No stridor. Abdominal:      General: Abdomen is flat. There is no distension. Palpations: Abdomen is soft. Tenderness: There is no abdominal tenderness. Musculoskeletal:         General: Normal range of motion. Cervical back: Normal range of motion. No tenderness. Right lower leg: No edema. Left lower leg: No edema. Lymphadenopathy:      Cervical: No cervical adenopathy. Skin:     General: Skin is warm and dry. Capillary Refill: Capillary refill takes less than 2 seconds. Coloration: Skin is not jaundiced or pale. Neurological:      General: No focal deficit present. Mental Status: She is alert and oriented to person, place, and time. Mental status is at baseline. Motor: No weakness. Gait: Gait normal.   Psychiatric:         Mood and Affect: Mood normal.         Behavior: Behavior normal.         Thought Content: Thought content normal.         Judgment: Judgment normal.         Result Review  Labs:  Office Visit on 08/18/2023   Component Date Value Ref Range Status   • Wound Culture 08/18/2023 4+ Growth of Providencia rettgeri (A)   Final    Note: CHARLEY of 8 is susceptible for IV Cefuroxime but resistant for PO Cefuroxime. Consider cefpodoxime if PO therapy indicated. • Wound Culture 08/18/2023 3+ Growth of   Final    Mixed Skin Zamzam   • Gram Stain Result 08/18/2023 No polys seen (A)   Final    This is an appended report. These results have been appended to a previously preliminary verified report. • Gram Stain Result 08/18/2023 1+ Gram positive cocci in pairs (A)   Final    This is an appended report. These results have been appended to a previously preliminary verified report. • Gram Stain Result 08/18/2023 1+ Gram variable rods (A)   Final    This is an appended report. These results have been appended to a previously preliminary verified report.    • Anaerobic Culture 08/18/2023 No anaerobes isolated   Final   Ancillary Orders on 08/18/2023   Component Date Value Ref Range Status   • WBC 08/31/2023 4.64  4.31 - 10.16 Thousand/uL Final   • RBC 08/31/2023 2.90 (L)  3.81 - 5.12 Million/uL Final   • Hemoglobin 08/31/2023 10.9 (L)  11.5 - 15.4 g/dL Final   • Hematocrit 08/31/2023 33.1 (L)  34.8 - 46.1 % Final   • MCV 08/31/2023 114 (H)  82 - 98 fL Final   • MCH 08/31/2023 37.6 (H)  26.8 - 34.3 pg Final   • MCHC 08/31/2023 32.9  31.4 - 37.4 g/dL Final • RDW 08/31/2023 12.9  11.6 - 15.1 % Final   • MPV 08/31/2023 8.8 (L)  8.9 - 12.7 fL Final   • Platelets 64/54/5300 320  149 - 390 Thousands/uL Final   • nRBC 08/31/2023 0  /100 WBCs Final   • Neutrophils Relative 08/31/2023 69  43 - 75 % Final   • Immat GRANS % 08/31/2023 0  0 - 2 % Final   • Lymphocytes Relative 08/31/2023 20  14 - 44 % Final   • Monocytes Relative 08/31/2023 8  4 - 12 % Final   • Eosinophils Relative 08/31/2023 2  0 - 6 % Final   • Basophils Relative 08/31/2023 1  0 - 1 % Final   • Neutrophils Absolute 08/31/2023 3.21  1.85 - 7.62 Thousands/µL Final   • Immature Grans Absolute 08/31/2023 0.01  0.00 - 0.20 Thousand/uL Final   • Lymphocytes Absolute 08/31/2023 0.91  0.60 - 4.47 Thousands/µL Final   • Monocytes Absolute 08/31/2023 0.35  0.17 - 1.22 Thousand/µL Final   • Eosinophils Absolute 08/31/2023 0.11  0.00 - 0.61 Thousand/µL Final   • Basophils Absolute 08/31/2023 0.05  0.00 - 0.10 Thousands/µL Final   • Sodium 08/31/2023 140  135 - 147 mmol/L Final   • Potassium 08/31/2023 4.3  3.5 - 5.3 mmol/L Final   • Chloride 08/31/2023 107  96 - 108 mmol/L Final   • CO2 08/31/2023 25  21 - 32 mmol/L Final   • ANION GAP 08/31/2023 8  mmol/L Final   • BUN 08/31/2023 24  5 - 25 mg/dL Final   • Creatinine 08/31/2023 1.48 (H)  0.60 - 1.30 mg/dL Final    Standardized to IDMS reference method   • Glucose 08/31/2023 107  65 - 140 mg/dL Final    If the patient is fasting, the ADA then defines impaired fasting glucose as > 100 mg/dL and diabetes as > or equal to 123 mg/dL. • Calcium 08/31/2023 8.9  8.4 - 10.2 mg/dL Final   • AST 08/31/2023 28  13 - 39 U/L Final   • ALT 08/31/2023 13  7 - 52 U/L Final    Specimen collection should occur prior to Sulfasalazine administration due to the potential for falsely depressed results.     • Alkaline Phosphatase 08/31/2023 38  34 - 104 U/L Final   • Total Protein 08/31/2023 6.5  6.4 - 8.4 g/dL Final   • Albumin 08/31/2023 4.3  3.5 - 5.0 g/dL Final   • Total Bilirubin 08/31/2023 0.33  0.20 - 1.00 mg/dL Final    Use of this assay is not recommended for patients undergoing treatment with eltrombopag due to the potential for falsely elevated results. N-acetyl-p-benzoquinone imine (metabolite of Acetaminophen) will generate erroneously low results in samples for patients that have taken an overdose of Acetaminophen. • eGFR 08/31/2023 35  ml/min/1.73sq m Final       Imaging:   No relevant imaging to review     Please note: This report has been generated by a voice recognition software system. Therefore there may be syntax, spelling, and/or grammatical errors. Please call if you have any questions.

## 2023-09-12 ENCOUNTER — APPOINTMENT (OUTPATIENT)
Dept: LAB | Facility: HOSPITAL | Age: 73
End: 2023-09-12
Payer: MEDICARE

## 2023-09-12 DIAGNOSIS — D47.3 ESSENTIAL THROMBOCYTOSIS (HCC): ICD-10-CM

## 2023-09-12 DIAGNOSIS — Z79.64 ENCOUNTER FOR MONITORING OF HYDROXYUREA THERAPY: ICD-10-CM

## 2023-09-12 DIAGNOSIS — Z51.81 ENCOUNTER FOR MONITORING OF HYDROXYUREA THERAPY: ICD-10-CM

## 2023-09-12 DIAGNOSIS — E46 PROTEIN-CALORIE MALNUTRITION, UNSPECIFIED SEVERITY (HCC): ICD-10-CM

## 2023-09-12 DIAGNOSIS — Z15.89 JAK2 V617F MUTATION: ICD-10-CM

## 2023-09-12 LAB
BASOPHILS # BLD AUTO: 0.03 THOUSANDS/ÂΜL (ref 0–0.1)
BASOPHILS NFR BLD AUTO: 1 % (ref 0–1)
EOSINOPHIL # BLD AUTO: 0.1 THOUSAND/ÂΜL (ref 0–0.61)
EOSINOPHIL NFR BLD AUTO: 3 % (ref 0–6)
ERYTHROCYTE [DISTWIDTH] IN BLOOD BY AUTOMATED COUNT: 12.8 % (ref 11.6–15.1)
FOLATE SERPL-MCNC: 15.9 NG/ML
HCT VFR BLD AUTO: 32.5 % (ref 34.8–46.1)
HGB BLD-MCNC: 10.6 G/DL (ref 11.5–15.4)
IMM GRANULOCYTES # BLD AUTO: 0.02 THOUSAND/UL (ref 0–0.2)
IMM GRANULOCYTES NFR BLD AUTO: 1 % (ref 0–2)
LYMPHOCYTES # BLD AUTO: 0.85 THOUSANDS/ÂΜL (ref 0.6–4.47)
LYMPHOCYTES NFR BLD AUTO: 21 % (ref 14–44)
MCH RBC QN AUTO: 36.8 PG (ref 26.8–34.3)
MCHC RBC AUTO-ENTMCNC: 32.6 G/DL (ref 31.4–37.4)
MCV RBC AUTO: 113 FL (ref 82–98)
MONOCYTES # BLD AUTO: 0.34 THOUSAND/ÂΜL (ref 0.17–1.22)
MONOCYTES NFR BLD AUTO: 9 % (ref 4–12)
NEUTROPHILS # BLD AUTO: 2.67 THOUSANDS/ÂΜL (ref 1.85–7.62)
NEUTS SEG NFR BLD AUTO: 65 % (ref 43–75)
NRBC BLD AUTO-RTO: 0 /100 WBCS
PLATELET # BLD AUTO: 309 THOUSANDS/UL (ref 149–390)
PMV BLD AUTO: 9 FL (ref 8.9–12.7)
RBC # BLD AUTO: 2.88 MILLION/UL (ref 3.81–5.12)
VIT B12 SERPL-MCNC: 175 PG/ML (ref 180–914)
WBC # BLD AUTO: 4.01 THOUSAND/UL (ref 4.31–10.16)

## 2023-09-12 PROCEDURE — 83918 ORGANIC ACIDS TOTAL QUANT: CPT

## 2023-09-12 PROCEDURE — 36415 COLL VENOUS BLD VENIPUNCTURE: CPT

## 2023-09-12 PROCEDURE — 82746 ASSAY OF FOLIC ACID SERUM: CPT

## 2023-09-12 PROCEDURE — 82607 VITAMIN B-12: CPT

## 2023-09-12 PROCEDURE — 85025 COMPLETE CBC W/AUTO DIFF WBC: CPT

## 2023-09-14 ENCOUNTER — TELEPHONE (OUTPATIENT)
Dept: HEMATOLOGY ONCOLOGY | Facility: CLINIC | Age: 73
End: 2023-09-14

## 2023-09-14 DIAGNOSIS — E53.8 B12 DEFICIENCY: Primary | ICD-10-CM

## 2023-09-14 RX ORDER — LANOLIN ALCOHOL/MO/W.PET/CERES
1000 CREAM (GRAM) TOPICAL DAILY
Qty: 90 TABLET | Refills: 1 | Status: SHIPPED | OUTPATIENT
Start: 2023-09-14

## 2023-09-14 NOTE — TELEPHONE ENCOUNTER
Spoke with patient to make her aware of her options. She would like to try the oral b12 first. If after a month her levels are still decreased, will try the injections. She verbalized understanding and agreed to plan.

## 2023-09-14 NOTE — TELEPHONE ENCOUNTER
Patient called in. She said she was told to get in touch with the office in regards to her folate & b12 count. She would like to know what to do next.

## 2023-09-16 LAB — METHYLMALONATE SERPL-SCNC: 377 NMOL/L (ref 0–378)

## 2023-09-26 ENCOUNTER — ANNUAL EXAM (OUTPATIENT)
Dept: OBGYN CLINIC | Facility: CLINIC | Age: 73
End: 2023-09-26
Payer: MEDICARE

## 2023-09-26 VITALS
SYSTOLIC BLOOD PRESSURE: 116 MMHG | DIASTOLIC BLOOD PRESSURE: 78 MMHG | HEIGHT: 63 IN | WEIGHT: 117 LBS | BODY MASS INDEX: 20.73 KG/M2

## 2023-09-26 DIAGNOSIS — Z01.419 WOMEN'S ANNUAL ROUTINE GYNECOLOGICAL EXAMINATION: Primary | ICD-10-CM

## 2023-09-26 PROCEDURE — G0101 CA SCREEN;PELVIC/BREAST EXAM: HCPCS | Performed by: OBSTETRICS & GYNECOLOGY

## 2023-09-26 NOTE — PROGRESS NOTES
Subjective      Mikayla Zamora is a 67 y.o. female who presents for annual well woman exam.       History of abnormal Pap smear: no  Denies any postmenopausal bleeding or hot flashes  Menstrual History:  OB History        2    Para   2    Term   2            AB        Living   2       SAB        IAB        Ectopic        Multiple        Live Births                      No LMP recorded. Patient is postmenopausal.       The following portions of the patient's history were reviewed and updated as appropriate: allergies, current medications, past family history, past medical history, past social history, past surgical history and problem list.    Review of Systems  Review of Systems   Constitutional: Negative for activity change, appetite change, chills, fatigue and fever. Respiratory: Negative for apnea, cough, chest tightness and shortness of breath. Cardiovascular: Negative for chest pain, palpitations and leg swelling. Gastrointestinal: Negative for abdominal pain, constipation, diarrhea, nausea and vomiting. Genitourinary: Negative for difficulty urinating, dysuria, flank pain, frequency, hematuria and urgency. Neurological: Negative for dizziness, seizures, syncope, light-headedness, numbness and headaches. Psychiatric/Behavioral: Negative for agitation and confusion.           Objective      /78 (BP Location: Left arm, Patient Position: Sitting, Cuff Size: Adult)   Ht 5' 3" (1.6 m)   Wt 53.1 kg (117 lb)   BMI 20.73 kg/m²     Physical Exam  OBGyn Exam     General:   alert and oriented, in no acute distress, alert, appears stated age and cooperative   Heart: regular rate and rhythm, S1, S2 normal, no murmur, click, rub or gallop   Lungs: clear to auscultation bilaterally   Abdomen: soft, non-tender, without masses or organomegaly   Vulva: normal   Vagina: normal mucosa, normal discharge   Cervix: no cervical motion tenderness and no lesions   Uterus: normal size Adnexa:  Breast Exam:  normal adnexa  breasts appear normal, no suspicious masses, no skin or nipple changes or axillary nodes. No abscess or other abnormality noted on exam today       Assessment      @well woman@ . 51-year-old female  Annual exam  History of left perineal abscess  Chronic hypertension  Thrombocytosis  Stage III chronic kidney disease  Psoriasis arthritis  Plan  No Pap needed  Diet/exercise  Calcium/vitamin D  Mammogram scheduled  Up-to-date with colonoscopy  DEXA scan  Return to office for annual exam     All questions answered. There are no Patient Instructions on file for this visit.

## 2023-09-27 ENCOUNTER — IMMUNIZATIONS (OUTPATIENT)
Dept: FAMILY MEDICINE CLINIC | Facility: CLINIC | Age: 73
End: 2023-09-27
Payer: MEDICARE

## 2023-09-27 DIAGNOSIS — Z23 ENCOUNTER FOR IMMUNIZATION: Primary | ICD-10-CM

## 2023-09-27 PROCEDURE — 90662 IIV NO PRSV INCREASED AG IM: CPT

## 2023-09-27 PROCEDURE — G0008 ADMIN INFLUENZA VIRUS VAC: HCPCS

## 2023-10-15 ENCOUNTER — APPOINTMENT (OUTPATIENT)
Dept: LAB | Facility: CLINIC | Age: 73
End: 2023-10-15
Payer: MEDICARE

## 2023-10-16 ENCOUNTER — TELEPHONE (OUTPATIENT)
Age: 73
End: 2023-10-16

## 2023-10-25 ENCOUNTER — HOSPITAL ENCOUNTER (OUTPATIENT)
Dept: RADIOLOGY | Facility: HOSPITAL | Age: 73
Discharge: HOME | End: 2023-10-25
Payer: MEDICARE

## 2023-10-25 VITALS — BODY MASS INDEX: 20.61 KG/M2 | HEIGHT: 62 IN | WEIGHT: 112 LBS

## 2023-10-25 VITALS — HEIGHT: 63 IN | BODY MASS INDEX: 20.38 KG/M2 | WEIGHT: 115 LBS

## 2023-10-25 DIAGNOSIS — Z12.31 ENCOUNTER FOR SCREENING MAMMOGRAM FOR BREAST CANCER: ICD-10-CM

## 2023-10-25 DIAGNOSIS — E28.39 MENOPAUSE OVARIAN FAILURE: ICD-10-CM

## 2023-10-25 PROCEDURE — 77067 SCR MAMMO BI INCL CAD: CPT

## 2023-10-25 PROCEDURE — 77080 DXA BONE DENSITY AXIAL: CPT

## 2023-10-25 PROCEDURE — 77063 BREAST TOMOSYNTHESIS BI: CPT

## 2023-10-26 ENCOUNTER — TELEPHONE (OUTPATIENT)
Dept: FAMILY MEDICINE CLINIC | Facility: CLINIC | Age: 73
End: 2023-10-26

## 2023-11-01 ENCOUNTER — TELEPHONE (OUTPATIENT)
Dept: FAMILY MEDICINE CLINIC | Facility: CLINIC | Age: 73
End: 2023-11-01

## 2023-11-01 NOTE — TELEPHONE ENCOUNTER
Called and left voice message regarding DEXA scan and need to get Prolia authorization from insurance to be given twice a year if approved. Advised to call office with any questions.

## 2023-11-01 NOTE — TELEPHONE ENCOUNTER
Pt called back stating that we can try to get the approval on the Prolia but not to call it in to the pharmacy until after her visit with Dr. Perry Quan on 11/14. Stated that she wants to speak with the doctor before going on any new medication.

## 2023-11-13 ENCOUNTER — OFFICE VISIT (OUTPATIENT)
Dept: FAMILY MEDICINE CLINIC | Facility: CLINIC | Age: 73
End: 2023-11-13
Payer: MEDICARE

## 2023-11-13 VITALS
RESPIRATION RATE: 18 BRPM | DIASTOLIC BLOOD PRESSURE: 78 MMHG | SYSTOLIC BLOOD PRESSURE: 134 MMHG | HEART RATE: 64 BPM | HEIGHT: 62 IN | OXYGEN SATURATION: 98 % | WEIGHT: 116.2 LBS | BODY MASS INDEX: 21.38 KG/M2 | TEMPERATURE: 98.2 F

## 2023-11-13 DIAGNOSIS — I10 HTN (HYPERTENSION), BENIGN: Primary | ICD-10-CM

## 2023-11-13 DIAGNOSIS — M81.0 AGE-RELATED OSTEOPOROSIS WITHOUT CURRENT PATHOLOGICAL FRACTURE: ICD-10-CM

## 2023-11-13 DIAGNOSIS — N18.31 STAGE 3A CHRONIC KIDNEY DISEASE (CKD) (HCC): ICD-10-CM

## 2023-11-13 DIAGNOSIS — L40.50 PSORIATIC ARTHRITIS (HCC): ICD-10-CM

## 2023-11-13 DIAGNOSIS — E78.2 MIXED HYPERLIPIDEMIA: ICD-10-CM

## 2023-11-13 DIAGNOSIS — D47.3 ESSENTIAL THROMBOCYTOSIS (HCC): ICD-10-CM

## 2023-11-13 PROCEDURE — 99213 OFFICE O/P EST LOW 20 MIN: CPT

## 2023-11-13 NOTE — ASSESSMENT & PLAN NOTE
Lab Results   Component Value Date    EGFR 35 08/31/2023    EGFR 50 07/31/2023    EGFR 51 05/01/2023    CREATININE 1.48 (H) 08/31/2023    CREATININE 1.10 07/31/2023    CREATININE 1.07 05/01/2023   creatinine and GFR stable   Will need periodic BMP  Avoid NSAIDs like ibuprofen Aleve Advil etc.  Avoid high potassium diet.   We will continue to monitor

## 2023-11-13 NOTE — ASSESSMENT & PLAN NOTE
Patient does not want to take any medication because of lot of issues with her jaw and teeth.   Patient was advised to do weightbearing exercises avoid caffeine and take Caltrate D6 100 mg twice a day

## 2023-11-13 NOTE — PROGRESS NOTES
Assessment/Plan:         Problem List Items Addressed This Visit     Psoriatic arthritis ([address])     Under control. Continue current medication. We will re-evaluate at next office visit. Essential thrombocytosis ([address])     Has been followed by a hematologist         HTN (hypertension), benign - Primary     Under control. Continue current medication. We will re-evaluate at next office visit. Mixed hyperlipidemia     Under control. Continue current medication. Has been followed by a rheumatologist  We will re-evaluate at next office visit. Stage 3a chronic kidney disease (CKD) ([facility])     Lab Results   Component Value Date    EGFR 35 08/31/2023    EGFR 50 07/31/2023    EGFR 51 05/01/2023    CREATININE 1.48 (H) 08/31/2023    CREATININE 1.10 07/31/2023    CREATININE 1.07 05/01/2023   creatinine and GFR stable   Will need periodic BMP  Avoid NSAIDs like ibuprofen Aleve Advil etc.  Avoid high potassium diet. We will continue to monitor            Age-related osteoporosis without current pathological fracture     Patient does not want to take any medication because of lot of issues with her jaw and teeth. Patient was advised to do weightbearing exercises avoid caffeine and take Caltrate D6 100 mg twice a day              Subjective:      Patient ID: Antonietta Bynum is a 68 y.o. female. Patient here for review of chronic medical problems and  the labs and imaging if it is applicable. Currently has no specific complaints other than mentioned in the review of systems  Denies chest pain, SOB, cough, abdominal pain, nausea, vomiting, fever, chills, lightheadedness, dizziness,headache, tingling or numbness. No bowel or bladder problem.           The following portions of the patient's history were reviewed and updated as appropriate:   Past Medical History:  She has a past medical history of Allergic (2022), Essential thrombocytosis ([address]), Hyperlipidemia, Hypertension, Nodular goiter, and Psoriasis. ,  _______________________________________________________________________  Medical Problems:  does not have any pertinent problems on file.,  _______________________________________________________________________  Past Surgical History:   has a past surgical history that includes Appendectomy; Mammo needle localization right (all inc) (Right, 07/13/2009); Skin lesion excision; Colonoscopy (10/31/2018); Mammo (historical); DXA procedure(historical) (04/21/2017); and Breast cyst excision (Right, 2009). ,  _______________________________________________________________________  Family History:  family history includes Cancer in her brother and brother; Coronary artery disease in her brother; Depression in her mother; Hearing loss in her mother; Hypertension in her brother and mother; No Known Problems in her daughter, daughter, maternal aunt, maternal aunt, maternal aunt, maternal aunt, maternal grandfather, maternal grandmother, paternal aunt, paternal grandfather, and paternal grandmother; Stroke in her mother; Tuberculosis in her brother and father.,  _______________________________________________________________________  Social History:   reports that she has quit smoking. Her smoking use included cigarettes. She has a 25.00 pack-year smoking history. She has never used smokeless tobacco. She reports that she does not currently use alcohol. She reports that she does not use drugs. ,  _______________________________________________________________________  Allergies:  is allergic to keflex [cephalexin] and neomycin-polymyxin-dexameth. .  _______________________________________________________________________  Current Outpatient Medications   Medication Sig Dispense Refill   • amLODIPine (NORVASC) 5 mg tablet TAKE 1 TABLET BY MOUTH TWICE DAILY 180 tablet 0   • Apremilast 30 MG TABS 30 mg Every 12 hours      • aspirin 81 MG tablet Take 81 mg by mouth daily      • Cholecalciferol (VITAMIN D3) 5000 units TABS 5,000 Units Daily      • diphenhydrAMINE (BENADRYL) 25 mg capsule Take 25 mg by mouth every 12 (twelve) hours as needed for itching      • Fluocinolone Acetonide Scalp 0.01 % OIL      • halobetasol (ULTRAVATE) 0.05 % ointment       • hydrocortisone 2.5 % ointment      • hydroxyurea (HYDREA) 500 mg capsule Take 1 capsule (500 mg total) by mouth daily 90 capsule 3   • losartan (COZAAR) 50 mg tablet TAKE 1 TABLET BY MOUTH EVERY DAY 90 tablet 1   • Probiotic Product (PROBIOTIC DAILY PO) Take 1 capsule by mouth daily     • rosuvastatin (CRESTOR) 5 mg tablet TAKE 1 TABLET BY MOUTH DAILY AT BEDTIME 90 tablet 1   • vitamin B-12 (VITAMIN B-12) 1,000 mcg tablet Take 1 tablet (1,000 mcg total) by mouth daily 90 tablet 1     No current facility-administered medications for this visit.     _______________________________________________________________________  Review of Systems   Constitutional:  Negative for chills, fatigue and fever. HENT:  Negative for congestion, ear pain, rhinorrhea, sneezing and sore throat. Eyes:  Negative for redness, itching and visual disturbance. Respiratory:  Negative for cough, chest tightness and shortness of breath. Cardiovascular:  Negative for chest pain, palpitations and leg swelling. Gastrointestinal:  Negative for abdominal pain, blood in stool, diarrhea, nausea and vomiting. Endocrine: Negative for cold intolerance and heat intolerance. Genitourinary:  Negative for dysuria, frequency and urgency. Musculoskeletal:  Negative for arthralgias, back pain and myalgias. Skin:  Negative for color change and rash. Neurological:  Negative for dizziness, weakness, light-headedness, numbness and headaches. Hematological:  Does not bruise/bleed easily. Psychiatric/Behavioral:  Negative for agitation, behavioral problems and confusion.           Objective:  Vitals:    11/13/23 1535 11/13/23 1559   BP: 160/100 134/78   BP Location: Left arm Left arm   Patient Position: Sitting Sitting   Cuff Size: Standard Standard   Pulse: 64    Resp: 18    Temp: 98.2 °F (36.8 °C)    TempSrc: Tympanic    SpO2: 98%    Weight: 52.7 kg (116 lb 3.2 oz)    Height: 5' 2" (1.575 m)      Body mass index is 21.25 kg/m². Physical Exam  Vitals and nursing note reviewed. Constitutional:       General: She is not in acute distress. Appearance: Normal appearance. She is not ill-appearing, toxic-appearing or diaphoretic. HENT:      Head: Normocephalic and atraumatic. Right Ear: Tympanic membrane, ear canal and external ear normal. There is no impacted cerumen. Left Ear: Tympanic membrane, ear canal and external ear normal. There is impacted cerumen. Nose: Nose normal. No congestion. Mouth/Throat:      Mouth: Mucous membranes are moist.   Eyes:      General: No scleral icterus. Right eye: No discharge. Left eye: No discharge. Extraocular Movements: Extraocular movements intact. Conjunctiva/sclera: Conjunctivae normal.      Pupils: Pupils are equal, round, and reactive to light. Neck:      Comments: Enlargement of left submandibular gland with mild tenderness  Cardiovascular:      Rate and Rhythm: Normal rate and regular rhythm. Pulses: Normal pulses. Heart sounds: Normal heart sounds. No murmur heard. No gallop. Pulmonary:      Effort: Pulmonary effort is normal. No respiratory distress. Breath sounds: Normal breath sounds. No wheezing, rhonchi or rales. Abdominal:      General: Abdomen is flat. Bowel sounds are normal. There is no distension. Palpations: Abdomen is soft. Tenderness: There is no abdominal tenderness. There is no guarding. Musculoskeletal:         General: No swelling or tenderness. Normal range of motion. Cervical back: Normal range of motion and neck supple. No rigidity. Right lower leg: No edema. Left lower leg: No edema. Lymphadenopathy:      Cervical: No cervical adenopathy. Skin:     General: Skin is warm. Capillary Refill: Capillary refill takes 2 to 3 seconds. Coloration: Skin is not jaundiced. Findings: No bruising or rash. Neurological:      General: No focal deficit present. Mental Status: She is alert and oriented to person, place, and time. Mental status is at baseline.       Gait: Gait normal.   Psychiatric:         Mood and Affect: Mood normal.         Behavior: Behavior normal.

## 2023-11-13 NOTE — ASSESSMENT & PLAN NOTE
Under control. Continue current medication. Has been followed by a rheumatologist  We will re-evaluate at next office visit.

## 2023-12-31 ENCOUNTER — APPOINTMENT (OUTPATIENT)
Dept: LAB | Facility: CLINIC | Age: 73
End: 2023-12-31
Payer: MEDICARE

## 2024-02-12 ENCOUNTER — TELEPHONE (OUTPATIENT)
Dept: HEMATOLOGY ONCOLOGY | Facility: CLINIC | Age: 74
End: 2024-02-12

## 2024-02-12 NOTE — TELEPHONE ENCOUNTER
Patient Call    Who are you speaking with? Patient    If it is not the patient, are they listed on an active communication consent form? N/A   What is the reason for this call? Pt ask if it is ok for her to take florastor   Does this require a call back? Yes   If a call back is required, please list [facility] call back number [phone]    If a call back is required, advise that a message will be forwarded to their care team and someone will return their call as soon as possible.   Did you relay this information to the patient? Yes

## 2024-02-13 ENCOUNTER — TELEMEDICINE (OUTPATIENT)
Dept: HEMATOLOGY ONCOLOGY | Facility: CLINIC | Age: 74
End: 2024-02-13
Payer: MEDICARE

## 2024-02-13 DIAGNOSIS — E53.8 B12 DEFICIENCY: ICD-10-CM

## 2024-02-13 DIAGNOSIS — D47.3 ESSENTIAL THROMBOCYTOSIS (HCC): Primary | ICD-10-CM

## 2024-02-13 PROCEDURE — 99443 PR PHYS/QHP TELEPHONE EVALUATION 21-30 MIN: CPT | Performed by: INTERNAL MEDICINE

## 2024-02-13 NOTE — TELEPHONE ENCOUNTER
Call out to patient and reviewed recommendations for patient to have appointment with Dr. Mathews at 140p today. Patient agreeable. Reviewed phone number and appointment details. Dr. Mathews notified.

## 2024-02-13 NOTE — PROGRESS NOTES
Telemedicine consent    Patient: Ayla Nye  Provider: Casey Mathews MD  Provider located at [address] [place] [address] [facility] [address] [id number]    The patient was identified by name and date of birth. Ayla Nye was informed that this is a telemedicine visit and that the visit is being conducted through Telephone.  My office door was closed. No one else was in the room.  She acknowledged consent and understanding of privacy and security of the video platform. The patient has agreed to participate and understands they can discontinue the visit at any time.    Patient is aware this is a billable service.                                    Hematology Outpatient Office Note    Date of Service: 2/13/2024    [facility] [place]  [address] [facility] [address] [phone]    Reason for Consultation: No chief complaint on file.      Referral Physician: No ref. provider found    Primary Care Physician:  Rafy Angelo MD     Nickname: Ayla      ASSESSMENT & PLAN      Diagnosis ICD-10-CM Associated Orders   1. Essential thrombocytosis (HCC)  D47.3           This is a 73 y.o. c PMHx notable for HTN, osteoporosis, macrocytic anemia, latent TB, CKD IIIA, psoriatic arthritis, Vit B12 deficiency being seen in consultation for Essential Thrombocytosis           Discussion of decision making    I personally reviewed the following lab results, the image studies, pathology, other specialty/physicians consult notes and recommendations, and outside medical records. I had a lengthy discussion with the patient and shared the work-up findings. We discussed the diagnosis and management plan as below. I spent 41 minutes reviewing the records (labs, clinician notes, outside records, medical history, ordering medicine/tests/procedures, interpreting the imaging/labs previously done) and coordination of care as well as direct time with  the patient today, of which greater than 50% of the time was spent in counseling and coordination of care with the patient/family.      Plan/Labs  Cont Hydrea 500mg M-F  CBC, Vit B12 ordered  Recommended against the Floranost Pro-biotic she is inquiring about due to interactions with Hydrea, she will find another one that is better compatible  Cont Vit B12 1000mcg daily in the meantime  Assessing Ig levels due to recurrent PNA and sinus infections        Follow Up: 1 month    All questions were answered to the patient's satisfaction during this encounter. The patient knows the contact information for our office and knows to reach out for any relevant concerns related to this encounter. They are to call for any temperature 100.4 or higher, new symptoms including but not restricted to shaking chills, decreased appetite, nausea, vomiting, diarrhea, increased fatigue, shortness of breath or chest pain, confusion, and not feeling the strength to come to the clinic. For all other listed problems and medical diagnosis in their chart - they are managed by PCP and/or other specialists, which the patient acknowledges. Thank you very much for your consultation and making us a part of this patient's care. We are continuing to follow closely with you. Please do not hesitate to reach out to me with any additional questions or concerns.    Casey Mathews MD  Hematology & Medical Oncology Staff Physician             Disclaimer: This document was prepared using [employer] technology. If a word or phrase is confusing, or does not make sense, this is likely due to recognition error which was not discovered during this clinician's review. If you believe an error has occurred, please contact me through [facility]On service line for kyle?cation.      HEMATOLOGICAL HISTORY OF PRESENT ILLNESS        7/2020: Hydrea 500mg started, then changed to MWF due to leukopenia and fatigue  3/2023: Hydrea increased to 500mg daily  Hydrea  then decreased to 500mg M-F  2023: Vit B12 175, folate folate 15.9, Hgb 10.6,   2023: WBC 5.54k, Hgb 12.2, , plt 361k  SUBJECTIVE  (INTERVAL HISTORY)    No significant fatigue or issues on the Hydrea M-F. Looking to take some from of probiotic due to recommendations she has received.    I have reviewed the relevant past medical, surgical, social and family history. I have also reviewed allergies and medications for this patient.    Review of Systems         A 10-point review of system was performed, pertinent positive and negative were detailed as above. Otherwise, the 10-point review of system was negative.      Past Medical History:   Diagnosis Date    Allergic     Allergic to neomiacin and cephalexin    Essential thrombocytosis (HCC)     controlled with medication    Hyperlipidemia     Hypertension     Nodular goiter     Psoriasis        Past Surgical History:   Procedure Laterality Date    APPENDECTOMY      BREAST CYST EXCISION Right     COLONOSCOPY  10/31/2018    DXA PROCEDURE (HISTORICAL)  2017    MAMMO (HISTORICAL)      8-24-18    MAMMO NEEDLE LOCALIZATION RIGHT (ALL INC) Right 2009    SKIN LESION EXCISION      bridge of nose       Family History   Problem Relation Age of Onset    Stroke Mother     Depression Mother             Hypertension Mother     Hearing loss Mother     Tuberculosis Father     Cancer Brother         lung    Tuberculosis Brother     Coronary artery disease Brother     Hypertension Brother     No Known Problems Daughter     No Known Problems Daughter     No Known Problems Maternal Grandmother     No Known Problems Maternal Grandfather     No Known Problems Paternal Grandmother     No Known Problems Paternal Grandfather     No Known Problems Maternal Aunt     No Known Problems Maternal Aunt     No Known Problems Maternal Aunt     No Known Problems Maternal Aunt     No Known Problems Paternal Aunt     Cancer Brother         Throat canver        Social History     Socioeconomic History    Marital status:      Spouse name: Not on file    Number of children: Not on file    Years of education: Not on file    Highest education level: Not on file   Occupational History    Not on file   Tobacco Use    Smoking status: Former     Current packs/day: 1.00     Average packs/day: 1 pack/day for 25.0 years (25.0 ttl pk-yrs)     Types: Cigarettes    Smokeless tobacco: Never    Tobacco comments:     Quit 2010   Vaping Use    Vaping status: Never Used   Substance and Sexual Activity    Alcohol use: Not Currently    Drug use: Never    Sexual activity: Not Currently     Birth control/protection: Post-menopausal   Other Topics Concern    Not on file   Social History Narrative    Not on file     Social Determinants of Health     Financial Resource Strain: Low Risk  (8/14/2023)    Overall Financial Resource Strain (CARDIA)     Difficulty of Paying Living Expenses: Not hard at all   Food Insecurity: Not on file   Transportation Needs: No Transportation Needs (8/14/2023)    PRAPARE - Transportation     Lack of Transportation (Medical): No     Lack of Transportation (Non-Medical): No   Physical Activity: Not on file   Stress: Not on file   Social Connections: Not on file   Intimate Partner Violence: Not on file   Housing Stability: Not on file       Allergies   Allergen Reactions    Keflex [Cephalexin] Rash    Neomycin-Polymyxin-Dexameth Eye Swelling       Current Outpatient Medications   Medication Sig Dispense Refill    amLODIPine (NORVASC) 5 mg tablet TAKE 1 TABLET BY MOUTH TWICE DAILY 180 tablet 0    Apremilast 30 MG TABS 30 mg Every 12 hours       aspirin 81 MG tablet Take 81 mg by mouth daily       Cholecalciferol (VITAMIN D3) 5000 units TABS 5,000 Units Daily       diphenhydrAMINE (BENADRYL) 25 mg capsule Take 25 mg by mouth every 12 (twelve) hours as needed for itching       Fluocinolone Acetonide Scalp 0.01 % OIL       halobetasol (ULTRAVATE) 0.05 % ointment  "       hydrocortisone 2.5 % ointment       hydroxyurea (HYDREA) 500 mg capsule Take 1 capsule (500 mg total) by mouth daily 90 capsule 3    losartan (COZAAR) 50 mg tablet TAKE 1 TABLET BY MOUTH EVERY DAY 90 tablet 1    Probiotic Product (PROBIOTIC DAILY PO) Take 1 capsule by mouth daily      rosuvastatin (CRESTOR) 5 mg tablet TAKE 1 TABLET BY MOUTH DAILY AT BEDTIME 90 tablet 1    vitamin B-12 (VITAMIN B-12) 1,000 mcg tablet Take 1 tablet (1,000 mcg total) by mouth daily 90 tablet 1     No current facility-administered medications for this visit.       (Not in a hospital admission)        Objective:     24 Hour Vitals Assessment:     There were no vitals filed for this visit.    PHYSICIAN EXAM:    This was a televisit      DATA REVIEW:    Pathology Result:    No results found for: \"FINALDX\"       Image Results:   Image result are reviewed and documented in Hematology/Oncology history. I personally reviewed these images.    DXA bone density spine hip and pelvis  Narrative: DXA SCAN    CLINICAL HISTORY: 72 years postmenopausal female.  OTHER RISK FACTORS:  None.    PHARMACOLOGIC THERAPY FOR OSTEOPOROSIS:  None.    TECHNIQUE: Bone densitometry was performed using a fanatix bone densitometer.  Regions of interest appear properly placed.    COMPARISON: There are no prior DXA studies performed on this unit for comparison.    RESULTS:    LUMBAR SPINE  Level: L1-L4 :  BMD: 0.902 gm/cm2  T-score: -2.4    LEFT TOTAL HIP:  BMD: 0.676 gm/cm2  T-score: -2.6    LEFT FEMORAL NECK:  BMD: 0.726 gm/cm2  T score: -2.2    RIGHT TOTAL HIP:  BMD: 0.676 gm/cm2  T-score: -2.6    RIGHT FEMORAL NECK:  BMD: 0.671 gm/cm2  T score: -2.6    LEFT  FOREARM:  33% RADIUS BMD: 0.622 gm/cm2  T-score: -2.9  Impression: 1. Osteoporosis.    2.  The 10 year risk of hip fracture is 5.0% with the 10 year risk of major osteoporotic fracture being 15.2% as calculated by the [employer] fracture risk assessment tool (FRAX, which is based on data " generated by the WHO Collaborating   [employer] for Metabolic Bone Diseases).    3.  The current NOF guidelines recommend treating patients with a T-score of -2.5 or less in the lumbar spine or hips, or in post-menopausal women and men over the age of 50 with low bone mass (osteopenia) and a FRAX 10 year risk score of >3% for hip   fracture and/or >20% for major osteoporotic fracture.    4.  The NOF recommends follow-up DXA in 1-2 years after initiating therapy for osteoporosis and every 2 years thereafter. More frequent evaluation is appropriate for patients with conditions associated with rapid bone loss, such as glucocorticoid   therapy. The interval between DXA screenings may be longer for individuals without major risk factors and initial T-score in the normal or upper low bone mass range.    The FRAX algorithm has certain limitations:  -FRAX has not been validated in patients currently or previously treated with pharmacotherapy for osteoporosis.  In such patients, clinical judgment must be exercised in interpreting FRAX scores.  -Prior hip, vertebral and humeral fragility fractures appear to confer greater risk of subsequent fracture than fractures at other sites (this is especially true for individuals with severe vertebral fractures), but quantification of this incremental   risk is not possible with FRAX.  -FRAX underestimates fracture risk in patients with history of multiple fragility fractures.  -FRAX may underestimate fracture risk in patients with history of frequent falls.  -It is not appropriate to use FRAX to monitor treatment response.    WHO CLASSIFICATION:  Normal (a T-score of -1.0 or higher)  Low bone mineral density (a T-score of less than -1.0 but higher than -2.5)  Osteoporosis (a T-score of -2.5 or less)  Severe osteoporosis (a T-score of -2.5 or less with a fragility fracture)    Workstation performed: [id number]      LABS:  Lab data are reviewed and documented in HemOnc history.       Lab  "Results   Component Value Date    HGB 12.2 12/31/2023    HCT 37.6 12/31/2023     (H) 12/31/2023     12/31/2023    WBC 5.54 12/31/2023    NRBC 0 12/31/2023     Lab Results   Component Value Date    K 4.3 08/31/2023     08/31/2023    CO2 25 08/31/2023    BUN 24 08/31/2023    CREATININE 1.48 (H) 08/31/2023    GLUF 86 07/31/2023    CALCIUM 8.9 08/31/2023    AST 28 08/31/2023    ALT 13 08/31/2023    ALKPHOS 38 08/31/2023    EGFR 35 08/31/2023       Lab Results   Component Value Date    IRON 81 05/28/2020    TIBC 302 05/28/2020    FERRITIN 39 05/28/2020    FERRITIN 56 01/28/2020       Lab Results   Component Value Date    [id number] 175 (L) 09/12/2023       No results for input(s): \"WBC\", \"CREAT\", \"PLT\" in the last 72 hours.     By:  Casey Mathews MD, 2/13/2024, 8:57 AM                                  "

## 2024-02-15 ENCOUNTER — RA CDI HCC (OUTPATIENT)
Dept: OTHER | Facility: HOSPITAL | Age: 74
End: 2024-02-15

## 2024-02-16 ENCOUNTER — APPOINTMENT (OUTPATIENT)
Dept: LAB | Facility: CLINIC | Age: 74
End: 2024-02-16
Payer: MEDICARE

## 2024-02-16 DIAGNOSIS — E53.8 B12 DEFICIENCY: ICD-10-CM

## 2024-02-16 DIAGNOSIS — D47.3 ESSENTIAL THROMBOCYTOSIS (HCC): ICD-10-CM

## 2024-02-16 LAB
IGA SERPL-MCNC: 126 MG/DL (ref 66–433)
IGG SERPL-MCNC: 571 MG/DL (ref 635–1741)
IGM SERPL-MCNC: 53 MG/DL (ref 45–281)
VIT B12 SERPL-MCNC: 927 PG/ML (ref 180–914)

## 2024-02-16 PROCEDURE — 82607 VITAMIN B-12: CPT

## 2024-02-16 PROCEDURE — 36415 COLL VENOUS BLD VENIPUNCTURE: CPT

## 2024-02-16 PROCEDURE — 82784 ASSAY IGA/IGD/IGG/IGM EACH: CPT

## 2024-02-22 ENCOUNTER — OFFICE VISIT (OUTPATIENT)
Dept: FAMILY MEDICINE CLINIC | Facility: CLINIC | Age: 74
End: 2024-02-22
Payer: MEDICARE

## 2024-02-22 VITALS
TEMPERATURE: 98 F | DIASTOLIC BLOOD PRESSURE: 70 MMHG | BODY MASS INDEX: 20.83 KG/M2 | HEIGHT: 62 IN | OXYGEN SATURATION: 98 % | HEART RATE: 71 BPM | RESPIRATION RATE: 18 BRPM | SYSTOLIC BLOOD PRESSURE: 100 MMHG | WEIGHT: 113.2 LBS

## 2024-02-22 DIAGNOSIS — M81.0 AGE-RELATED OSTEOPOROSIS WITHOUT CURRENT PATHOLOGICAL FRACTURE: ICD-10-CM

## 2024-02-22 DIAGNOSIS — D47.3 ESSENTIAL THROMBOCYTOSIS (HCC): ICD-10-CM

## 2024-02-22 DIAGNOSIS — J18.9 FREQUENT EPISODES OF PNEUMONIA: ICD-10-CM

## 2024-02-22 DIAGNOSIS — L40.50 PSORIATIC ARTHRITIS (HCC): ICD-10-CM

## 2024-02-22 DIAGNOSIS — E78.2 MIXED HYPERLIPIDEMIA: ICD-10-CM

## 2024-02-22 DIAGNOSIS — I10 HTN (HYPERTENSION), BENIGN: Primary | ICD-10-CM

## 2024-02-22 DIAGNOSIS — D80.3 IMMUNOGLOBULIN G DEFICIENCY (HCC): ICD-10-CM

## 2024-02-22 DIAGNOSIS — N18.32 STAGE 3B CHRONIC KIDNEY DISEASE (CKD) (HCC): ICD-10-CM

## 2024-02-22 PROBLEM — N18.31 STAGE 3A CHRONIC KIDNEY DISEASE (CKD) (HCC): Status: RESOLVED | Noted: 2023-02-16 | Resolved: 2024-02-22

## 2024-02-22 PROCEDURE — 99214 OFFICE O/P EST MOD 30 MIN: CPT

## 2024-02-22 NOTE — PROGRESS NOTES
Assessment/Plan:         Problem List Items Addressed This Visit     Psoriatic arthritis (HCC)     Under control.    Continue current medication.    We will re-evaluate at next office visit.  Patient has been followed by a dermatologist and rheumatologist         Essential thrombocytosis (HCC)     Patient has been followed by hematologist continue hydroxyurea for now.  We will continue to monitor         HTN (hypertension), benign - Primary     Under control.  Continue amlodipine and losartan.  We will continue to monitor         Mixed hyperlipidemia     Under control with diet and exercise.  Continue rosuvastatin.  We will continue to monitor         Age-related osteoporosis without current pathological fracture     Under control.    Continue current medication.    We will re-evaluate at next office visit.           Stage 3b chronic kidney disease (CKD) ([facility])     Lab Results   Component Value Date    EGFR 35 08/31/2023    EGFR 50 07/31/2023    EGFR 51 05/01/2023    CREATININE 1.48 (H) 08/31/2023    CREATININE 1.10 07/31/2023    CREATININE 1.07 05/01/2023   creatinine and GFR stable   Will need periodic BMP  Avoid NSAIDs like ibuprofen Aleve Advil etc.  Avoid high potassium diet.  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 female.    Patient here for review of chronic medical problems and  the labs and imaging if it is applicable.  Currently has no specific complaints other than mentioned in the review of systems  Denies chest pain, SOB, cough, abdominal pain, nausea, vomiting, fever, chills, lightheadedness, dizziness,headache, tingling or numbness.No bowel or bladder problem.  Patient is concerned because over the last  year or year and a half she had 3 episode of pneumonia and an x-ray shows some kind of scar tissue so she is worried also her hematologist ordered immunoglobulin pattern and her IgG is low        The following portions of the patient's history were reviewed and updated as appropriate:   Past Medical History:  She has a past medical history of Allergic (2022), Essential thrombocytosis (HCC), Hyperlipidemia, Hypertension, Nodular goiter, and Psoriasis.,  _______________________________________________________________________  Medical Problems:  does not have any pertinent problems on file.,  _______________________________________________________________________  Past Surgical History:   has a past surgical history that includes Appendectomy; Mammo needle localization right (all inc) (Right, 07/13/2009); Skin lesion excision; Colonoscopy (10/31/2018); Mammo (historical); DXA procedure(historical) (04/21/2017); and Breast cyst excision (Right, 2009).,  _______________________________________________________________________  Family History:  family history includes Cancer in her brother and brother; Coronary artery disease in her brother; Depression in her mother; Hearing loss in her mother; Hypertension in her brother and mother; No Known Problems in her daughter, daughter, maternal aunt, maternal aunt, maternal aunt, maternal aunt, maternal grandfather, maternal grandmother, paternal aunt, paternal grandfather, and paternal grandmother; Stroke in her mother; Tuberculosis in her brother and father.,  _______________________________________________________________________  Social History:   reports that she has quit smoking. Her smoking use included cigarettes. She has a 25 pack-year smoking history. She has never used smokeless tobacco. She reports that she does not currently use alcohol. She reports that she does not use drugs.,  _______________________________________________________________________  Allergies:  is  allergic to doxycycline, keflex [cephalexin], and neomycin-polymyxin-dexameth..  _______________________________________________________________________  Current Outpatient Medications   Medication Sig Dispense Refill   • amLODIPine (NORVASC) 5 mg tablet TAKE 1 TABLET BY MOUTH TWICE DAILY 180 tablet 0   • Apremilast 30 MG TABS 30 mg Every 12 hours      • aspirin 81 MG tablet Take 81 mg by mouth daily      • Cholecalciferol (VITAMIN D3) 5000 units TABS 5,000 Units Daily      • diphenhydrAMINE (BENADRYL) 25 mg capsule Take 25 mg by mouth every 12 (twelve) hours as needed for itching      • Fluocinolone Acetonide Scalp 0.01 % OIL      • halobetasol (ULTRAVATE) 0.05 % ointment       • hydrocortisone 2.5 % ointment      • hydroxyurea (HYDREA) 500 mg capsule Take 1 capsule (500 mg total) by mouth daily 90 capsule 3   • losartan (COZAAR) 50 mg tablet TAKE 1 TABLET BY MOUTH EVERY DAY 90 tablet 1   • Probiotic Product (PROBIOTIC DAILY PO) Take 1 capsule by mouth daily     • rosuvastatin (CRESTOR) 5 mg tablet TAKE 1 TABLET BY MOUTH DAILY AT BEDTIME 90 tablet 1   • vitamin B-12 (VITAMIN B-12) 1,000 mcg tablet Take 1 tablet (1,000 mcg total) by mouth daily 90 tablet 1     No current facility-administered medications for this visit.     _______________________________________________________________________  Review of Systems   Constitutional:  Negative for chills, fatigue and fever.   HENT:  Negative for congestion, ear pain, rhinorrhea, sneezing and sore throat.    Eyes:  Negative for redness, itching and visual disturbance.   Respiratory:  Negative for cough, chest tightness and shortness of breath.    Cardiovascular:  Negative for chest pain, palpitations and leg swelling.   Gastrointestinal:  Negative for abdominal pain, blood in stool, diarrhea, nausea and vomiting.   Endocrine: Negative for cold intolerance and heat intolerance.   Genitourinary:  Negative for dysuria, frequency and urgency.   Musculoskeletal:  Negative  "for arthralgias, back pain and myalgias.   Skin:  Negative for color change and rash.   Neurological:  Negative for dizziness, weakness, light-headedness, numbness and headaches.   Hematological:  Does not bruise/bleed easily.   Psychiatric/Behavioral:  Negative for agitation, behavioral problems and confusion.          Objective:  Vitals:    02/22/24 1408   BP: 100/70   BP Location: Right arm   Patient Position: Sitting   Cuff Size: Standard   Pulse: 71   Resp: 18   Temp: 98 °F (36.7 °C)   TempSrc: Tympanic   SpO2: 98%   Weight: 51.3 kg (113 lb 3.2 oz)   Height: 5' 2\" (1.575 m)     Body mass index is 20.7 kg/m².     Physical Exam  Vitals and nursing note reviewed.   Constitutional:       General: She is not in acute distress.     Appearance: Normal appearance. She is not ill-appearing, toxic-appearing or diaphoretic.   HENT:      Head: Normocephalic and atraumatic.      Nose: Nose normal. No congestion.      Mouth/Throat:      Mouth: Mucous membranes are moist.   Eyes:      General: No scleral icterus.        Right eye: No discharge.         Left eye: No discharge.      Extraocular Movements: Extraocular movements intact.      Conjunctiva/sclera: Conjunctivae normal.      Pupils: Pupils are equal, round, and reactive to light.   Cardiovascular:      Rate and Rhythm: Normal rate and regular rhythm.      Pulses: Normal pulses.      Heart sounds: Normal heart sounds. No murmur heard.     No gallop.   Pulmonary:      Effort: Pulmonary effort is normal. No respiratory distress.      Breath sounds: Normal breath sounds. No wheezing, rhonchi or rales.   Abdominal:      General: Abdomen is flat. Bowel sounds are normal. There is no distension.      Palpations: Abdomen is soft.      Tenderness: There is no abdominal tenderness. There is no guarding.   Musculoskeletal:         General: No swelling or tenderness. Normal range of motion.      Cervical back: Normal range of motion and neck supple. No rigidity. "   Lymphadenopathy:      Cervical: No cervical adenopathy.   Skin:     General: Skin is warm.      Capillary Refill: Capillary refill takes 2 to 3 seconds.      Coloration: Skin is not jaundiced.      Findings: No bruising or rash.   Neurological:      General: No focal deficit present.      Mental Status: She is alert and oriented to person, place, and time. Mental status is at baseline.      Gait: Gait normal.   Psychiatric:         Mood and Affect: Mood normal.

## 2024-02-22 NOTE — ASSESSMENT & PLAN NOTE
Patient has been followed by hematologist continue hydroxyurea for now.  We will continue to monitor

## 2024-02-22 NOTE — ASSESSMENT & PLAN NOTE
Under control.    Continue current medication.    We will re-evaluate at next office visit.  Patient has been followed by a dermatologist and rheumatologist

## 2024-02-22 NOTE — ASSESSMENT & PLAN NOTE
Lab Results   Component Value Date    EGFR 35 08/31/2023    EGFR 50 07/31/2023    EGFR 51 05/01/2023    CREATININE 1.48 (H) 08/31/2023    CREATININE 1.10 07/31/2023    CREATININE 1.07 05/01/2023

## 2024-02-28 ENCOUNTER — HOSPITAL ENCOUNTER (OUTPATIENT)
Dept: CT IMAGING | Facility: HOSPITAL | Age: 74
Discharge: HOME | End: 2024-02-28
Payer: MEDICARE

## 2024-02-28 DIAGNOSIS — J18.9 FREQUENT EPISODES OF PNEUMONIA: ICD-10-CM

## 2024-02-28 PROCEDURE — 71250 CT THORAX DX C-: CPT

## 2024-03-05 ENCOUNTER — OFFICE VISIT (OUTPATIENT)
Dept: HEMATOLOGY ONCOLOGY | Facility: CLINIC | Age: 74
End: 2024-03-05
Payer: MEDICARE

## 2024-03-05 VITALS
HEIGHT: 62 IN | RESPIRATION RATE: 17 BRPM | HEART RATE: 90 BPM | OXYGEN SATURATION: 97 % | BODY MASS INDEX: 21.35 KG/M2 | TEMPERATURE: 97.5 F | WEIGHT: 116 LBS | SYSTOLIC BLOOD PRESSURE: 110 MMHG | DIASTOLIC BLOOD PRESSURE: 82 MMHG

## 2024-03-05 DIAGNOSIS — R91.8 MULTIPLE LUNG NODULES ON CT: ICD-10-CM

## 2024-03-05 DIAGNOSIS — D47.3 ESSENTIAL THROMBOCYTOSIS (HCC): ICD-10-CM

## 2024-03-05 DIAGNOSIS — Z51.81 ENCOUNTER FOR MONITORING OF HYDROXYUREA THERAPY: ICD-10-CM

## 2024-03-05 DIAGNOSIS — Z15.89 JAK2 V617F MUTATION: Primary | ICD-10-CM

## 2024-03-05 DIAGNOSIS — Z87.891 HISTORY OF SMOKING: ICD-10-CM

## 2024-03-05 DIAGNOSIS — D80.3 IGG DEFICIENCY (HCC): ICD-10-CM

## 2024-03-05 DIAGNOSIS — Z79.64 ENCOUNTER FOR MONITORING OF HYDROXYUREA THERAPY: ICD-10-CM

## 2024-03-05 DIAGNOSIS — R59.0 PARATRACHEAL LYMPHADENOPATHY: ICD-10-CM

## 2024-03-05 PROCEDURE — 99215 OFFICE O/P EST HI 40 MIN: CPT

## 2024-03-05 RX ORDER — HYDROXYUREA 500 MG/1
500 CAPSULE ORAL DAILY
Qty: 90 CAPSULE | Refills: 3 | Status: SHIPPED | OUTPATIENT
Start: 2024-03-05

## 2024-03-05 NOTE — PROGRESS NOTES
[facility] [place]  [address]  [place] [address]  Hematology Ambulatory Follow-Up  Ayla Nye, 1950, [id number]  3/5/2024    Assessment/Plan:  1. JAK2 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  2. Essential thrombocytosis (HCC)  3. Encounter for monitoring of hydroxyurea therapy  Ms. 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  She has been tolerating Hydrea 500 mg once daily Monday through Friday and off on Saturday and Sunday.  Her platelet count is slightly increased to 417 on most recent labs but she has been dealing with ongoing pneumonia and other lung issues, see below.  Could be reactive.  Will continue to monitor with monthly labs if continued elevation occurs dose adjustments will be made accordingly.  She did not have CMP with most recent set of labs will monitor with next set while on therapy.    - hydroxyurea (HYDREA) 500 mg capsule; Take 1 capsule (500 mg total) by mouth daily  Dispense: 90 capsule; Refill: 3  - CBC and differential; Standing    4. Multiple lung nodules on CT  5. History of smoking  6. Paratracheal lymphadenopathy  She has been following with outpatient out of network office for imaging with x-rays with ongoing pneumonia.  There was visible scarring on x-ray and she was sent for a CT scan.  The results became available to her over [employer]hart while she was in the waiting room today for her visit and she was visibly upset during her visit today therefore the imaging was reviewed in detail with her during today's visit.  We reviewed that the findings are consistent with 4.1 x 3.4 cm right upper lobe mass as well as a 1.5 cm nodule adjacent to this mass, and 2.5 x 2.1 cm left upper lobe groundglass nodule suspicious for malignancy versus infectious/inflammatory.  She also has 2 enlarged right lower paratracheal lymph nodes measuring 1.6 x 2 cm and 1.3 x 1.3 cm.  She  does have a smoking history and quit 15 years ago.  She has no signs and symptoms of pneumonia currently without any fevers, cough, or shortness of breath.  For further workup of this I have placed referral for pulmonology to obtain tissue sampling.  She the patient understands that a PET scan will likely not be approved until tissue sampling is obtained but this will be scheduled for her at checkout.  I will follow along with her chart and determine hematology/oncology follow-up after tissue sample is obtained.    - Ambulatory Referral to Pulmonology; Future  - NM PET CT skull base to mid thigh; Future    7. IgG deficiency (HCC)  IgG mildly low at 571.  This is not significantly decreased enough to cause the recurrent infections.  Will continue to monitor on subsequent labs.  No need for intervention at this time.    - Comprehensive metabolic panel; Future  - IgG, IgA, IgM; Future      Patient voiced agreement and understanding to the above. Patient knows to call the Hematology/Oncology office with any questions and concerns regarding the above.      Barrier(s) to care: None  The patient is able to self care.  ------------------------------------------------------------------------------------------------------    Chief Complaint   Patient presents with    Follow-up       History of present illness:   Ayla Nye is a 73-year-old female with past medical history of hypertension, psoriatic arthritis, CKD stage III, hyperlipidemia, and latent TB.  She is seen in follow-up for essential thrombocytosis.  She has had an elevated platelet count dating back to January 2028 all of her other cell lines were within normal limits.  Her highest platelet count was around 700,000.  Myeloproliferative work-up demonstrated positive JAK2 mutation and she was started on 500 mg of Hydrea daily in July 2020.  Due to leukopenia her dose was reduced to Monday, Wednesday, Friday.  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  Then again in July we had to decrease her dose and she is taking 500 mg once daily Monday through Friday and not taking any on Saturday or Sunday.    Interval history: She has been tolerating the 500 mg of Hydrea Monday through Friday off Saturday and Sunday well without any side effects.  She was seen by my attending and underwent further workup of her recurrent infections and was found to have a mildly low IgG level.    She has had pneumonia twice once in October and then again in February.  She states that she has had multiple imaging of her chest which does suggest scarring which prompted a CT scan of her chest that she had completed last week.  She is also had recurrent sinus infections.  She is a former smoker and quit 15 years ago.  She does not have any continued symptoms of pneumonia and no cough, shortness of breath, or fevers.    While the patient was waiting for her appointment today the results of the CT scan came across her MyChart.  She was visibly upset reviewing the scan results and they were reviewed in detail with her during today's visit.    Review of Systems   Constitutional:  Positive for fatigue. Negative for activity change, appetite change, diaphoresis, fever and unexpected weight change.   HENT:  Negative for trouble swallowing and voice change.    Eyes:  Negative for photophobia and visual disturbance.   Respiratory:  Negative for cough, chest tightness and shortness of breath.    Cardiovascular:  Negative for chest pain, palpitations and leg swelling.   Gastrointestinal:  Negative for abdominal distention, abdominal pain, anal bleeding, blood in stool, constipation, diarrhea, nausea and vomiting.   Endocrine: Negative for cold intolerance and heat intolerance.   Genitourinary:  Negative for dysuria, hematuria and  urgency.   Musculoskeletal:  Negative for arthralgias, back pain, gait problem, joint swelling and myalgias.   Skin:  Negative for pallor and rash.   Neurological:  Negative for dizziness, weakness, light-headedness and headaches.   Hematological:  Negative for adenopathy. Does not bruise/bleed easily.   Psychiatric/Behavioral:  Negative for confusion and sleep disturbance.        Patient Active Problem List   Diagnosis    TB lung, latent    Psoriatic arthritis (HCC)    Essential thrombocytosis (HCC)    HTN (hypertension), benign    Mixed hyperlipidemia    Encounter for monitoring of hydroxyurea therapy    JAK2 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    Age-related osteoporosis without current pathological fracture    Stage 3b chronic kidney disease (CKD) (HCC)       Past Medical History:   Diagnosis Date    Allergic     Allergic to neomiacin and cephalexin    Essential thrombocytosis (HCC)     controlled with medication    Hyperlipidemia     Hypertension     Nodular goiter     Psoriasis        Past Surgical History:   Procedure Laterality Date    APPENDECTOMY      BREAST CYST EXCISION Right     COLONOSCOPY  10/31/2018    DXA PROCEDURE (HISTORICAL)  2017    MAMMO (HISTORICAL)      8-24-18    MAMMO NEEDLE LOCALIZATION RIGHT (ALL INC) Right 2009    SKIN LESION EXCISION      bridge of nose       Family History   Problem Relation Age of Onset    Stroke Mother     Depression Mother             Hypertension Mother     Hearing loss Mother     Tuberculosis Father     Cancer Brother         lung    Tuberculosis Brother     Coronary artery disease Brother     Hypertension Brother     No Known Problems Daughter     No Known Problems Daughter     No Known Problems Maternal Grandmother     No Known Problems Maternal Grandfather     No Known Problems Paternal Grandmother     No Known Problems Paternal Grandfather     No Known Problems Maternal Aunt     No Known Problems Maternal Aunt     No Known Problems Maternal Aunt     No  Known Problems Maternal Aunt     No Known Problems Paternal Aunt     Cancer Brother         Throat canver       Social History     Socioeconomic History    Marital status:      Spouse name: Not on file    Number of children: Not on file    Years of education: Not on file    Highest education level: Not on file   Occupational History    Not on file   Tobacco Use    Smoking status: Former     Current packs/day: 1.00     Average packs/day: 1 pack/day for 25.0 years (25.0 ttl pk-yrs)     Types: Cigarettes    Smokeless tobacco: Never    Tobacco comments:     Quit 2010   Vaping Use    Vaping status: Never Used   Substance and Sexual Activity    Alcohol use: Not Currently    Drug use: Never    Sexual activity: Not Currently     Birth control/protection: Post-menopausal   Other Topics Concern    Not on file   Social History Narrative    Not on file     Social Determinants of Health     Financial Resource Strain: Low Risk  (8/14/2023)    Overall Financial Resource Strain (CARDIA)     Difficulty of Paying Living Expenses: Not hard at all   Food Insecurity: Not on file   Transportation Needs: No Transportation Needs (8/14/2023)    PRAPARE - Transportation     Lack of Transportation (Medical): No     Lack of Transportation (Non-Medical): No   Physical Activity: Not on file   Stress: Not on file   Social Connections: Not on file   Intimate Partner Violence: Not on file   Housing Stability: Not on file         Current Outpatient Medications:     amLODIPine (NORVASC) 5 mg tablet, TAKE 1 TABLET BY MOUTH TWICE DAILY, Disp: 180 tablet, Rfl: 0    Apremilast 30 MG TABS, 30 mg Every 12 hours , Disp: , Rfl:     aspirin 81 MG tablet, Take 81 mg by mouth daily , Disp: , Rfl:     Cholecalciferol (VITAMIN D3) 5000 units TABS, 5,000 Units Daily , Disp: , Rfl:     diphenhydrAMINE (BENADRYL) 25 mg capsule, Take 25 mg by mouth every 12 (twelve) hours as needed for itching , Disp: , Rfl:     Fluocinolone Acetonide Scalp 0.01 % OIL, ,  "Disp: , Rfl:     halobetasol (ULTRAVATE) 0.05 % ointment,  , Disp: , Rfl:     hydrocortisone 2.5 % ointment, , Disp: , Rfl:     hydroxyurea (HYDREA) 500 mg capsule, Take 1 capsule (500 mg total) by mouth daily, Disp: 90 capsule, Rfl: 3    losartan (COZAAR) 50 mg tablet, TAKE 1 TABLET BY MOUTH EVERY DAY, Disp: 90 tablet, Rfl: 1    Probiotic Product (PROBIOTIC DAILY PO), Take 1 capsule by mouth daily, Disp: , Rfl:     rosuvastatin (CRESTOR) 5 mg tablet, TAKE 1 TABLET BY MOUTH DAILY AT BEDTIME, Disp: 90 tablet, Rfl: 1    vitamin B-12 (VITAMIN B-12) 1,000 mcg tablet, Take 1 tablet (1,000 mcg total) by mouth daily, Disp: 90 tablet, Rfl: 1    Allergies   Allergen Reactions    Doxycycline Headache    Keflex [Cephalexin] Rash    Neomycin-Polymyxin-Dexameth Eye Swelling       Objective:  /82 (BP Location: Left arm, Patient Position: Sitting, Cuff Size: Adult)   Pulse 90   Temp 97.5 °F (36.4 °C) (Temporal)   Resp 17   Ht 5' 2\" (1.575 m)   Wt 52.6 kg (116 lb)   SpO2 97%   BMI 21.22 kg/m²    Physical Exam  Constitutional:       General: She is not in acute distress.     Appearance: Normal appearance. She is normal weight. She is not ill-appearing.   HENT:      Head: Normocephalic and atraumatic.   Eyes:      Extraocular Movements: Extraocular movements intact.      Conjunctiva/sclera: Conjunctivae normal.      Pupils: Pupils are equal, round, and reactive to light.   Cardiovascular:      Rate and Rhythm: Normal rate and regular rhythm.      Pulses: Normal pulses.      Heart sounds: Normal heart sounds. No murmur heard.  Pulmonary:      Effort: Pulmonary effort is normal. No respiratory distress.      Breath sounds: Normal breath sounds. No stridor.   Abdominal:      General: Abdomen is flat. There is no distension.      Palpations: Abdomen is soft.      Tenderness: There is no abdominal tenderness.   Musculoskeletal:         General: Normal range of motion.      Cervical back: Normal range of motion. No tenderness. "      Right lower leg: No edema.      Left lower leg: No edema.   Lymphadenopathy:      Cervical: No cervical adenopathy.   Skin:     General: Skin is warm and dry.      Capillary Refill: Capillary refill takes less than 2 seconds.      Coloration: Skin is not jaundiced or pale.   Neurological:      General: No focal deficit present.      Mental Status: She is alert and oriented to person, place, and time. Mental status is at baseline.      Motor: No weakness.      Gait: Gait normal.   Psychiatric:         Mood and Affect: Mood normal.         Behavior: Behavior normal.         Thought Content: Thought content normal.         Judgment: Judgment normal.         Result Review  Labs:  Appointment on 02/16/2024   Component Date Value Ref Range Status    IGA 02/16/2024 126  66 - 433 mg/dL Final    IGG 02/16/2024 571 (L)  635 - 1,741 mg/dL Final    IGM 02/16/2024 53  45 - 281 mg/dL Final    Vitamin B-12 02/16/2024 927 (H)  180 - 914 pg/mL Final   Ancillary Orders on 01/19/2024   Component Date Value Ref Range Status    WBC 02/16/2024 7.69  4.31 - 10.16 Thousand/uL Final    RBC 02/16/2024 3.47 (L)  3.81 - 5.12 Million/uL Final    Hemoglobin 02/16/2024 12.1  11.5 - 15.4 g/dL Final    Hematocrit 02/16/2024 36.8  34.8 - 46.1 % Final    MCV 02/16/2024 106 (H)  82 - 98 fL Final    MCH 02/16/2024 34.9 (H)  26.8 - 34.3 pg Final    MCHC 02/16/2024 32.9  31.4 - 37.4 g/dL Final    RDW 02/16/2024 14.4  11.6 - 15.1 % Final    MPV 02/16/2024 9.0  8.9 - 12.7 fL Final    Platelets 02/16/2024 417 (H)  149 - 390 Thousands/uL Final    nRBC 02/16/2024 0  /100 WBCs Final    Neutrophils Relative 02/16/2024 78 (H)  43 - 75 % Final    Immat GRANS % 02/16/2024 1  0 - 2 % Final    Lymphocytes Relative 02/16/2024 10 (L)  14 - 44 % Final    Monocytes Relative 02/16/2024 7  4 - 12 % Final    Eosinophils Relative 02/16/2024 3  0 - 6 % Final    Basophils Relative 02/16/2024 1  0 - 1 % Final    Neutrophils Absolute 02/16/2024 6.06  1.85 - 7.62  Thousands/µL Final    Immature Grans Absolute 02/16/2024 0.07  0.00 - 0.20 Thousand/uL Final    Lymphocytes Absolute 02/16/2024 0.77  0.60 - 4.47 Thousands/µL Final    Monocytes Absolute 02/16/2024 0.51  0.17 - 1.22 Thousand/µL Final    Eosinophils Absolute 02/16/2024 0.22  0.00 - 0.61 Thousand/µL Final    Basophils Absolute 02/16/2024 0.06  0.00 - 0.10 Thousands/µL Final       Imaging:   No relevant imaging to review     Please note:  This report has been generated by a voice recognition software system. Therefore there may be syntax, spelling, and/or grammatical errors. Please call if you have any questions.

## 2024-03-05 NOTE — LETTER
March 5, 2024     Rafy Angelo MD  [address] [facility] [facility]  [address]  [facility] [address]    Patient: Ayla Nye   YOB: 1950   Date of Visit: 3/5/2024       Dear Dr. Angelo:    Thank you for referring Ayla Nye to me for evaluation. Below are my notes for this consultation.    If you have questions, please do not hesitate to call me. I look forward to following your patient along with you.         Sincerely,        ZULEYMA Boland        CC: No Recipients    ZULEYMA Boland  3/5/2024  3:52 PM  Sign when Signing Visit  [address] [facility] [place]  [address]  [place] [address]  Hematology Ambulatory Follow-Up  Ayla Nye, 1950, [id number]  3/5/2024    Assessment/Plan:  1. JAK2 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  2. Essential thrombocytosis (HCC)  3. Encounter for monitoring of hydroxyurea therapy  Ms. 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  She has been tolerating Hydrea 500 mg once daily Monday through Friday and off on Saturday and Sunday.  Her platelet count is slightly increased to 417 on most recent labs but she has been dealing with ongoing pneumonia and other lung issues, see below.  Could be reactive.  Will continue to monitor with monthly labs if continued elevation occurs dose adjustments will be made accordingly.  She did not have CMP with most recent set of labs will monitor with next set while on therapy.    - hydroxyurea (HYDREA) 500 mg capsule; Take 1 capsule (500 mg total) by mouth daily  Dispense: 90 capsule; Refill: 3  - CBC and differential; Standing    4. Multiple lung nodules on CT  5. History of smoking  6. Paratracheal lymphadenopathy  She has been following with outpatient out of network office for imaging with x-rays with ongoing pneumonia.  There was visible scarring on x-ray and she was sent for a CT scan.  The results  became available to her over [email or web address] while she was in the waiting room today for her visit and she was visibly upset during her visit today therefore the imaging was reviewed in detail with her during today's visit.  We reviewed that the findings are consistent with 4.1 x 3.4 cm right upper lobe mass as well as a 1.5 cm nodule adjacent to this mass, and 2.5 x 2.1 cm left upper lobe groundglass nodule suspicious for malignancy versus infectious/inflammatory.  She also has 2 enlarged right lower paratracheal lymph nodes measuring 1.6 x 2 cm and 1.3 x 1.3 cm.  She does have a smoking history and quit 15 years ago.  She has no signs and symptoms of pneumonia currently without any fevers, cough, or shortness of breath.  For further workup of this I have placed referral for pulmonology to obtain tissue sampling.  She the patient understands that a PET scan will likely not be approved until tissue sampling is obtained but this will be scheduled for her at checkout.  I will follow along with her chart and determine hematology/oncology follow-up after tissue sample is obtained.    - Ambulatory Referral to Pulmonology; Future  - NM PET CT skull base to mid thigh; Future    7. IgG deficiency (HCC)  IgG mildly low at 571.  This is not significantly decreased enough to cause the recurrent infections.  Will continue to monitor on subsequent labs.  No need for intervention at this time.    - Comprehensive metabolic panel; Future  - IgG, IgA, IgM; Future      Patient voiced agreement and understanding to the above. Patient knows to call the Hematology/Oncology office with any questions and concerns regarding the above.      Barrier(s) to care: None  The patient is able to self care.  ------------------------------------------------------------------------------------------------------    Chief Complaint   Patient presents with   • Follow-up       History of present illness:   Ayla Nye is a 73-year-old female with past  medical history of hypertension, psoriatic arthritis, CKD stage III, hyperlipidemia, and latent TB.  She is seen in follow-up for essential thrombocytosis.  She has had an elevated platelet count dating back to January 2028 all of her other cell lines were within normal limits.  Her highest platelet count was around 700,000.  Myeloproliferative work-up demonstrated positive JAK2 mutation and she was started on 500 mg of Hydrea daily in July 2020.  Due to leukopenia her dose was reduced to Monday, Wednesday, Friday.  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  Then again in July we had to decrease her dose and she is taking 500 mg once daily Monday through Friday and not taking any on Saturday or Sunday.    Interval history: She has been tolerating the 500 mg of Hydrea Monday through Friday off Saturday and Sunday well without any side effects.  She was seen by my attending and underwent further workup of her recurrent infections and was found to have a mildly low IgG level.    She has had pneumonia twice once in October and then again in February.  She states that she has had multiple imaging of her chest which does suggest scarring which prompted a CT scan of her chest that she had completed last week.  She is also had recurrent sinus infections.  She is a former smoker and quit 15 years ago.  She does not have any continued symptoms of pneumonia and no cough, shortness of breath, or fevers.    While the patient was waiting for her appointment today the results of the CT scan came across her MyChart.  She was visibly upset reviewing the scan results and they were reviewed in detail with her during today's visit.    Review of Systems   Constitutional:  Positive for fatigue. Negative for activity change, appetite change, diaphoresis,  fever and unexpected weight change.   HENT:  Negative for trouble swallowing and voice change.    Eyes:  Negative for photophobia and visual disturbance.   Respiratory:  Negative for cough, chest tightness and shortness of breath.    Cardiovascular:  Negative for chest pain, palpitations and leg swelling.   Gastrointestinal:  Negative for abdominal distention, abdominal pain, anal bleeding, blood in stool, constipation, diarrhea, nausea and vomiting.   Endocrine: Negative for cold intolerance and heat intolerance.   Genitourinary:  Negative for dysuria, hematuria and urgency.   Musculoskeletal:  Negative for arthralgias, back pain, gait problem, joint swelling and myalgias.   Skin:  Negative for pallor and rash.   Neurological:  Negative for dizziness, weakness, light-headedness and headaches.   Hematological:  Negative for adenopathy. Does not bruise/bleed easily.   Psychiatric/Behavioral:  Negative for confusion and sleep disturbance.        Patient Active Problem List   Diagnosis   • TB lung, latent   • Psoriatic arthritis (HCC)   • Essential thrombocytosis (HCC)   • HTN (hypertension), benign   • Mixed hyperlipidemia   • Encounter for monitoring of hydroxyurea therapy   • JAK2 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   • Age-related osteoporosis without current pathological fracture   • Stage 3b chronic kidney disease (CKD) (HCC)       Past Medical History:   Diagnosis Date   • Allergic 2022    Allergic to neomiacin and cephalexin   • Essential thrombocytosis (HCC)     controlled with medication   • Hyperlipidemia    • Hypertension    • Nodular goiter    • Psoriasis        Past Surgical History:   Procedure Laterality Date   • APPENDECTOMY     • BREAST CYST EXCISION Right 2009   • COLONOSCOPY  10/31/2018   • DXA PROCEDURE (HISTORICAL)  04/21/2017   • MAMMO (HISTORICAL)      8-24-18   • MAMMO NEEDLE LOCALIZATION RIGHT (ALL INC) Right 07/13/2009   • SKIN LESION EXCISION      bridge of nose       Family History   Problem Relation Age of  Onset   • Stroke Mother    • Depression Mother            • Hypertension Mother    • Hearing loss Mother    • Tuberculosis Father    • Cancer Brother         lung   • Tuberculosis Brother    • Coronary artery disease Brother    • Hypertension Brother    • No Known Problems Daughter    • No Known Problems Daughter    • No Known Problems Maternal Grandmother    • No Known Problems Maternal Grandfather    • No Known Problems Paternal Grandmother    • No Known Problems Paternal Grandfather    • No Known Problems Maternal Aunt    • No Known Problems Maternal Aunt    • No Known Problems Maternal Aunt    • No Known Problems Maternal Aunt    • No Known Problems Paternal Aunt    • Cancer Brother         Throat canver       Social History     Socioeconomic History   • Marital status:      Spouse name: Not on file   • Number of children: Not on file   • Years of education: Not on file   • Highest education level: Not on file   Occupational History   • Not on file   Tobacco Use   • Smoking status: Former     Current packs/day: 1.00     Average packs/day: 1 pack/day for 25.0 years (25.0 ttl pk-yrs)     Types: Cigarettes   • Smokeless tobacco: Never   • Tobacco comments:     Quit    Vaping Use   • Vaping status: Never Used   Substance and Sexual Activity   • Alcohol use: Not Currently   • Drug use: Never   • Sexual activity: Not Currently     Birth control/protection: Post-menopausal   Other Topics Concern   • Not on file   Social History Narrative   • Not on file     Social Determinants of Health     Financial Resource Strain: Low Risk  (2023)    Overall Financial Resource Strain (CARDIA)    • Difficulty of Paying Living Expenses: Not hard at all   Food Insecurity: Not on file   Transportation Needs: No Transportation Needs (2023)    PRAPARE - Transportation    • Lack of Transportation (Medical): No    • Lack of Transportation (Non-Medical): No   Physical Activity: Not on file   Stress: Not on file  "  Social Connections: Not on file   Intimate Partner Violence: Not on file   Housing Stability: Not on file         Current Outpatient Medications:   •  amLODIPine (NORVASC) 5 mg tablet, TAKE 1 TABLET BY MOUTH TWICE DAILY, Disp: 180 tablet, Rfl: 0  •  Apremilast 30 MG TABS, 30 mg Every 12 hours , Disp: , Rfl:   •  aspirin 81 MG tablet, Take 81 mg by mouth daily , Disp: , Rfl:   •  Cholecalciferol (VITAMIN D3) 5000 units TABS, 5,000 Units Daily , Disp: , Rfl:   •  diphenhydrAMINE (BENADRYL) 25 mg capsule, Take 25 mg by mouth every 12 (twelve) hours as needed for itching , Disp: , Rfl:   •  Fluocinolone Acetonide Scalp 0.01 % OIL, , Disp: , Rfl:   •  halobetasol (ULTRAVATE) 0.05 % ointment,  , Disp: , Rfl:   •  hydrocortisone 2.5 % ointment, , Disp: , Rfl:   •  hydroxyurea (HYDREA) 500 mg capsule, Take 1 capsule (500 mg total) by mouth daily, Disp: 90 capsule, Rfl: 3  •  losartan (COZAAR) 50 mg tablet, TAKE 1 TABLET BY MOUTH EVERY DAY, Disp: 90 tablet, Rfl: 1  •  Probiotic Product (PROBIOTIC DAILY PO), Take 1 capsule by mouth daily, Disp: , Rfl:   •  rosuvastatin (CRESTOR) 5 mg tablet, TAKE 1 TABLET BY MOUTH DAILY AT BEDTIME, Disp: 90 tablet, Rfl: 1  •  vitamin B-12 (VITAMIN B-12) 1,000 mcg tablet, Take 1 tablet (1,000 mcg total) by mouth daily, Disp: 90 tablet, Rfl: 1    Allergies   Allergen Reactions   • Doxycycline Headache   • Keflex [Cephalexin] Rash   • Neomycin-Polymyxin-Dexameth Eye Swelling       Objective:  /82 (BP Location: Left arm, Patient Position: Sitting, Cuff Size: Adult)   Pulse 90   Temp 97.5 °F (36.4 °C) (Temporal)   Resp 17   Ht 5' 2\" (1.575 m)   Wt 52.6 kg (116 lb)   SpO2 97%   BMI 21.22 kg/m²    Physical Exam  Constitutional:       General: She is not in acute distress.     Appearance: Normal appearance. She is normal weight. She is not ill-appearing.   HENT:      Head: Normocephalic and atraumatic.   Eyes:      Extraocular Movements: Extraocular movements intact.      " Conjunctiva/sclera: Conjunctivae normal.      Pupils: Pupils are equal, round, and reactive to light.   Cardiovascular:      Rate and Rhythm: Normal rate and regular rhythm.      Pulses: Normal pulses.      Heart sounds: Normal heart sounds. No murmur heard.  Pulmonary:      Effort: Pulmonary effort is normal. No respiratory distress.      Breath sounds: Normal breath sounds. No stridor.   Abdominal:      General: Abdomen is flat. There is no distension.      Palpations: Abdomen is soft.      Tenderness: There is no abdominal tenderness.   Musculoskeletal:         General: Normal range of motion.      Cervical back: Normal range of motion. No tenderness.      Right lower leg: No edema.      Left lower leg: No edema.   Lymphadenopathy:      Cervical: No cervical adenopathy.   Skin:     General: Skin is warm and dry.      Capillary Refill: Capillary refill takes less than 2 seconds.      Coloration: Skin is not jaundiced or pale.   Neurological:      General: No focal deficit present.      Mental Status: She is alert and oriented to person, place, and time. Mental status is at baseline.      Motor: No weakness.      Gait: Gait normal.   Psychiatric:         Mood and Affect: Mood normal.         Behavior: Behavior normal.         Thought Content: Thought content normal.         Judgment: Judgment normal.         Result Review  Labs:  Appointment on 02/16/2024   Component Date Value Ref Range Status   • IGA 02/16/2024 126  66 - 433 mg/dL Final   • IGG 02/16/2024 571 (L)  635 - 1,741 mg/dL Final   • IGM 02/16/2024 53  45 - 281 mg/dL Final   • Vitamin B-12 02/16/2024 927 (H)  180 - 914 pg/mL Final   Ancillary Orders on 01/19/2024   Component Date Value Ref Range Status   • WBC 02/16/2024 7.69  4.31 - 10.16 Thousand/uL Final   • RBC 02/16/2024 3.47 (L)  3.81 - 5.12 Million/uL Final   • Hemoglobin 02/16/2024 12.1  11.5 - 15.4 g/dL Final   • Hematocrit 02/16/2024 36.8  34.8 - 46.1 % Final   • MCV 02/16/2024 106 (H)  82 - 98  fL Final   • MCH 02/16/2024 34.9 (H)  26.8 - 34.3 pg Final   • MCHC 02/16/2024 32.9  31.4 - 37.4 g/dL Final   • RDW 02/16/2024 14.4  11.6 - 15.1 % Final   • MPV 02/16/2024 9.0  8.9 - 12.7 fL Final   • Platelets 02/16/2024 417 (H)  149 - 390 Thousands/uL Final   • nRBC 02/16/2024 0  /100 WBCs Final   • Neutrophils Relative 02/16/2024 78 (H)  43 - 75 % Final   • Immat GRANS % 02/16/2024 1  0 - 2 % Final   • Lymphocytes Relative 02/16/2024 10 (L)  14 - 44 % Final   • Monocytes Relative 02/16/2024 7  4 - 12 % Final   • Eosinophils Relative 02/16/2024 3  0 - 6 % Final   • Basophils Relative 02/16/2024 1  0 - 1 % Final   • Neutrophils Absolute 02/16/2024 6.06  1.85 - 7.62 Thousands/µL Final   • Immature Grans Absolute 02/16/2024 0.07  0.00 - 0.20 Thousand/uL Final   • Lymphocytes Absolute 02/16/2024 0.77  0.60 - 4.47 Thousands/µL Final   • Monocytes Absolute 02/16/2024 0.51  0.17 - 1.22 Thousand/µL Final   • Eosinophils Absolute 02/16/2024 0.22  0.00 - 0.61 Thousand/µL Final   • Basophils Absolute 02/16/2024 0.06  0.00 - 0.10 Thousands/µL Final       Imaging:   No relevant imaging to review     Please note:  This report has been generated by a voice recognition software system. Therefore there may be syntax, spelling, and/or grammatical errors. Please call if you have any questions.

## 2024-03-07 ENCOUNTER — CONSULT (OUTPATIENT)
Dept: PULMONOLOGY | Facility: CLINIC | Age: 74
End: 2024-03-07
Payer: MEDICARE

## 2024-03-07 VITALS
DIASTOLIC BLOOD PRESSURE: 70 MMHG | TEMPERATURE: 98 F | HEIGHT: 62 IN | BODY MASS INDEX: 20.8 KG/M2 | SYSTOLIC BLOOD PRESSURE: 106 MMHG | HEART RATE: 88 BPM | OXYGEN SATURATION: 98 % | WEIGHT: 113 LBS

## 2024-03-07 DIAGNOSIS — Z87.891 HISTORY OF SMOKING: ICD-10-CM

## 2024-03-07 DIAGNOSIS — R59.0 PARATRACHEAL LYMPHADENOPATHY: ICD-10-CM

## 2024-03-07 DIAGNOSIS — R91.8 MULTIPLE LUNG NODULES ON CT: Primary | ICD-10-CM

## 2024-03-07 PROCEDURE — 99204 OFFICE O/P NEW MOD 45 MIN: CPT | Performed by: STUDENT IN AN ORGANIZED HEALTH CARE EDUCATION/TRAINING PROGRAM

## 2024-03-07 NOTE — H&P (VIEW-ONLY)
Consultation - Pulmonary Medicine   Ayla Nye 73 y.o. female MRN: [id number]      Reason for Consult: Lung Mass    Ayla Nye is a 73 y.o. female with a PMH of HTN, Latent TB(Treated INH 2022), Psoriatic Arthritis, CKD, Thrombocytosis(JAK2) who presents for lung mass.    Lung Mass/Lymphadenopathy - High concerns for malignancy based on history and imaging findings. Will attempt to move up PET scan for procedural planning and likely move forward with EBUS with biopsy.  - Baseline PFTs with BD   - PET scan - will try to move up date to 1-2 weeks  - Plan for EBUS    Rogelio Crisostomo MD  [facility]    _____________________________________________________________________    HPI:    Ayla Nye is a 73 y.o. female with a PMH of HTN, Latent TB(Treated INH 2022), Psoriatic Arthritis, CKD, Thrombocytosis(JAK2) who presents for lung mass.    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  None    Imaging:  I personally reviewed the images on the PAC system pertinent to today's visit  CT Chest  1. 4.1 x 3.4 cm right upper lobe mass. Differential considerations include bronchogenic malignancy or pneumonia. If the patient does not have signs of pulmonary infection, FDG PET/CT and tissue sampling is recommended for further evaluation. If the   patient does have clinical signs of pulmonary infection, recommend treatment and follow-up CT in approximately 2 months for reassessment.     2. 1.5 cm spiculated nodule adjacent to this mass, which may represent satellite nodule or infectious/inflammatory process.     3. 2.5 x 2.1 cm left upper lobe groundglass nodule. This be infectious/inflammatory or  "represent a separate primary lung adenocarcinoma spectrum lesion.     4. 2 enlarged right lower paratracheal lymph nodes, possibly malignant yaw involvement. This can either be assessed on follow-up FDG PET/CT.       Other studies:  None     PhysicalExamination:  Vitals:   /70 (BP Location: Left arm, Patient Position: Sitting, Cuff Size: Standard)   Pulse 88   Temp 98 °F (36.7 °C) (Tympanic)   Ht 5' 2\" (1.575 m)   Wt 51.3 kg (113 lb)   SpO2 98%   BMI 20.67 kg/m²     Appearance -- NAD, speaking full sentences  Neuro -- A&Ox3  Neck -- no JVD  Heart -- RRR, no murmurs  Lungs -- CTAB  Abdomen -- soft, NTND  Extremities -- WWP, no LE edema  Skin -- no rash    Review of Systems:  Aside from what is mentioned in the HPI, the review of systems otherwise negative.    Immunization History   Administered Date(s) Administered    COVID-19 PFIZER VACCINE 0.3 ML IM 02/17/2021, 03/09/2021, 11/27/2021    Influenza, high dose seasonal 0.7 mL 10/12/2022, 09/27/2023    Pneumococcal Conjugate 13-Valent 01/16/2017    Pneumococcal Polysaccharide PPV23 01/18/2018        Current Medications:    Current Outpatient Medications:     amLODIPine (NORVASC) 5 mg tablet, TAKE 1 TABLET BY MOUTH TWICE DAILY, Disp: 180 tablet, Rfl: 0    Apremilast 30 MG TABS, 30 mg Every 12 hours , Disp: , Rfl:     aspirin 81 MG tablet, Take 81 mg by mouth daily , Disp: , Rfl:     Cholecalciferol (VITAMIN D3) 5000 units TABS, 5,000 Units Daily , Disp: , Rfl:     diphenhydrAMINE (BENADRYL) 25 mg capsule, Take 25 mg by mouth every 12 (twelve) hours as needed for itching , Disp: , Rfl:     Fluocinolone Acetonide Scalp 0.01 % OIL, , Disp: , Rfl:     halobetasol (ULTRAVATE) 0.05 % ointment,  , Disp: , Rfl:     hydrocortisone 2.5 % ointment, , Disp: , Rfl:     hydroxyurea (HYDREA) 500 mg capsule, Take 1 capsule (500 mg total) by mouth daily, Disp: 90 capsule, Rfl: 3    losartan (COZAAR) 50 mg tablet, TAKE 1 TABLET BY MOUTH EVERY DAY, Disp: 90 tablet, Rfl: 1    " Probiotic Product (PROBIOTIC DAILY PO), Take 1 capsule by mouth daily, Disp: , Rfl:     rosuvastatin (CRESTOR) 5 mg tablet, TAKE 1 TABLET BY MOUTH DAILY AT BEDTIME, Disp: 90 tablet, Rfl: 1    vitamin B-12 (VITAMIN B-12) 1,000 mcg tablet, Take 1 tablet (1,000 mcg total) by mouth daily, Disp: 90 tablet, Rfl: 1    Historical Information   Past Medical History:   Diagnosis Date    Allergic     Allergic to neomiacin and cephalexin    Essential thrombocytosis (HCC)     controlled with medication    Hyperlipidemia     Hypertension     Nodular goiter     Psoriasis      Past Surgical History:   Procedure Laterality Date    APPENDECTOMY      BREAST CYST EXCISION Right     COLONOSCOPY  10/31/2018    DXA PROCEDURE (HISTORICAL)  2017    MAMMO (HISTORICAL)      8--    MAMMO NEEDLE LOCALIZATION RIGHT (ALL INC) Right 2009    SKIN LESION EXCISION      bridge of nose     Social History   Social History     Tobacco Use   Smoking Status Former    Current packs/day: 1.00    Average packs/day: 1 pack/day for 58.2 years (58.2 ttl pk-yrs)    Types: Cigarettes    Start date:    Smokeless Tobacco Never   Tobacco Comments    Quit        Family History:   Family History   Problem Relation Age of Onset    Stroke Mother     Depression Mother             Hypertension Mother     Hearing loss Mother     Tuberculosis Father     Cancer Brother         lung    Tuberculosis Brother     Coronary artery disease Brother     Hypertension Brother     No Known Problems Daughter     No Known Problems Daughter     No Known Problems Maternal Grandmother     No Known Problems Maternal Grandfather     No Known Problems Paternal Grandmother     No Known Problems Paternal Grandfather     No Known Problems Maternal Aunt     No Known Problems Maternal Aunt     No Known Problems Maternal Aunt     No Known Problems Maternal Aunt     No Known Problems Paternal Aunt     Cancer Brother         Throat canver  "                Diagnostic Data:  Labs:  I personally reviewed the most recent laboratory data pertinent to today's visit    Lab Results   Component Value Date    WBC 7.69 02/16/2024    HGB 12.1 02/16/2024    HCT 36.8 02/16/2024     (H) 02/16/2024     (H) 02/16/2024     Lab Results   Component Value Date    CALCIUM 8.9 08/31/2023    K 4.3 08/31/2023    CO2 25 08/31/2023     08/31/2023    BUN 24 08/31/2023    CREATININE 1.48 (H) 08/31/2023     No results found for: \"IGE\"  Lab Results   Component Value Date    ALT 13 08/31/2023    AST 28 08/31/2023    ALKPHOS 38 08/31/2023           I have spent a total time of 45 minutes on 03/07/24 in caring for this patient including Diagnostic results, Prognosis, Risks and benefits of tx options, Instructions for management, Patient and family education, Importance of tx compliance, Risk factor reductions, Impressions, Counseling / Coordination of care, Documenting in the medical record, Reviewing / ordering tests, medicine, procedures  , Obtaining or reviewing history  , and Communicating with other healthcare professionals .   _        "

## 2024-03-07 NOTE — PROGRESS NOTES
Consultation - Pulmonary Medicine   Ayla Nye 73 y.o. female MRN: [id number]      Reason for Consult: Lung Mass    Ayla Nye is a 73 y.o. female with a PMH of HTN, Latent TB(Treated INH 2022), Psoriatic Arthritis, CKD, Thrombocytosis(JAK2) who presents for lung mass.    Lung Mass/Lymphadenopathy - High concerns for malignancy based on history and imaging findings. Will attempt to move up PET scan for procedural planning and likely move forward with EBUS with biopsy.  - Baseline PFTs with BD   - PET scan - will try to move up date to 1-2 weeks  - Plan for EBUS    Rogelio Crisostomo MD  [facility]    _____________________________________________________________________    HPI:    Ayla Nye is a 73 y.o. female with a PMH of HTN, Latent TB(Treated INH 2022), Psoriatic Arthritis, CKD, Thrombocytosis(JAK2) who presents for lung mass.    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  None    Imaging:  I personally reviewed the images on the PAC system pertinent to today's visit  CT Chest  1. 4.1 x 3.4 cm right upper lobe mass. Differential considerations include bronchogenic malignancy or pneumonia. If the patient does not have signs of pulmonary infection, FDG PET/CT and tissue sampling is recommended for further evaluation. If the   patient does have clinical signs of pulmonary infection, recommend treatment and follow-up CT in approximately 2 months for reassessment.     2. 1.5 cm spiculated nodule adjacent to this mass, which may represent satellite nodule or infectious/inflammatory process.     3. 2.5 x 2.1 cm left upper lobe groundglass nodule. This be infectious/inflammatory or  "represent a separate primary lung adenocarcinoma spectrum lesion.     4. 2 enlarged right lower paratracheal lymph nodes, possibly malignant yaw involvement. This can either be assessed on follow-up FDG PET/CT.       Other studies:  None     PhysicalExamination:  Vitals:   /70 (BP Location: Left arm, Patient Position: Sitting, Cuff Size: Standard)   Pulse 88   Temp 98 °F (36.7 °C) (Tympanic)   Ht 5' 2\" (1.575 m)   Wt 51.3 kg (113 lb)   SpO2 98%   BMI 20.67 kg/m²     Appearance -- NAD, speaking full sentences  Neuro -- A&Ox3  Neck -- no JVD  Heart -- RRR, no murmurs  Lungs -- CTAB  Abdomen -- soft, NTND  Extremities -- WWP, no LE edema  Skin -- no rash    Review of Systems:  Aside from what is mentioned in the HPI, the review of systems otherwise negative.    Immunization History   Administered Date(s) Administered    COVID-19 PFIZER VACCINE 0.3 ML IM 02/17/2021, 03/09/2021, 11/27/2021    Influenza, high dose seasonal 0.7 mL 10/12/2022, 09/27/2023    Pneumococcal Conjugate 13-Valent 01/16/2017    Pneumococcal Polysaccharide PPV23 01/18/2018        Current Medications:    Current Outpatient Medications:     amLODIPine (NORVASC) 5 mg tablet, TAKE 1 TABLET BY MOUTH TWICE DAILY, Disp: 180 tablet, Rfl: 0    Apremilast 30 MG TABS, 30 mg Every 12 hours , Disp: , Rfl:     aspirin 81 MG tablet, Take 81 mg by mouth daily , Disp: , Rfl:     Cholecalciferol (VITAMIN D3) 5000 units TABS, 5,000 Units Daily , Disp: , Rfl:     diphenhydrAMINE (BENADRYL) 25 mg capsule, Take 25 mg by mouth every 12 (twelve) hours as needed for itching , Disp: , Rfl:     Fluocinolone Acetonide Scalp 0.01 % OIL, , Disp: , Rfl:     halobetasol (ULTRAVATE) 0.05 % ointment,  , Disp: , Rfl:     hydrocortisone 2.5 % ointment, , Disp: , Rfl:     hydroxyurea (HYDREA) 500 mg capsule, Take 1 capsule (500 mg total) by mouth daily, Disp: 90 capsule, Rfl: 3    losartan (COZAAR) 50 mg tablet, TAKE 1 TABLET BY MOUTH EVERY DAY, Disp: 90 tablet, Rfl: 1    " Probiotic Product (PROBIOTIC DAILY PO), Take 1 capsule by mouth daily, Disp: , Rfl:     rosuvastatin (CRESTOR) 5 mg tablet, TAKE 1 TABLET BY MOUTH DAILY AT BEDTIME, Disp: 90 tablet, Rfl: 1    vitamin B-12 (VITAMIN B-12) 1,000 mcg tablet, Take 1 tablet (1,000 mcg total) by mouth daily, Disp: 90 tablet, Rfl: 1    Historical Information   Past Medical History:   Diagnosis Date    Allergic     Allergic to neomiacin and cephalexin    Essential thrombocytosis (HCC)     controlled with medication    Hyperlipidemia     Hypertension     Nodular goiter     Psoriasis      Past Surgical History:   Procedure Laterality Date    APPENDECTOMY      BREAST CYST EXCISION Right     COLONOSCOPY  10/31/2018    DXA PROCEDURE (HISTORICAL)  2017    MAMMO (HISTORICAL)      8--    MAMMO NEEDLE LOCALIZATION RIGHT (ALL INC) Right 2009    SKIN LESION EXCISION      bridge of nose     Social History   Social History     Tobacco Use   Smoking Status Former    Current packs/day: 1.00    Average packs/day: 1 pack/day for 58.2 years (58.2 ttl pk-yrs)    Types: Cigarettes    Start date:    Smokeless Tobacco Never   Tobacco Comments    Quit        Family History:   Family History   Problem Relation Age of Onset    Stroke Mother     Depression Mother             Hypertension Mother     Hearing loss Mother     Tuberculosis Father     Cancer Brother         lung    Tuberculosis Brother     Coronary artery disease Brother     Hypertension Brother     No Known Problems Daughter     No Known Problems Daughter     No Known Problems Maternal Grandmother     No Known Problems Maternal Grandfather     No Known Problems Paternal Grandmother     No Known Problems Paternal Grandfather     No Known Problems Maternal Aunt     No Known Problems Maternal Aunt     No Known Problems Maternal Aunt     No Known Problems Maternal Aunt     No Known Problems Paternal Aunt     Cancer Brother         Throat canver  "                Diagnostic Data:  Labs:  I personally reviewed the most recent laboratory data pertinent to today's visit    Lab Results   Component Value Date    WBC 7.69 02/16/2024    HGB 12.1 02/16/2024    HCT 36.8 02/16/2024     (H) 02/16/2024     (H) 02/16/2024     Lab Results   Component Value Date    CALCIUM 8.9 08/31/2023    K 4.3 08/31/2023    CO2 25 08/31/2023     08/31/2023    BUN 24 08/31/2023    CREATININE 1.48 (H) 08/31/2023     No results found for: \"IGE\"  Lab Results   Component Value Date    ALT 13 08/31/2023    AST 28 08/31/2023    ALKPHOS 38 08/31/2023           I have spent a total time of 45 minutes on 03/07/24 in caring for this patient including Diagnostic results, Prognosis, Risks and benefits of tx options, Instructions for management, Patient and family education, Importance of tx compliance, Risk factor reductions, Impressions, Counseling / Coordination of care, Documenting in the medical record, Reviewing / ordering tests, medicine, procedures  , Obtaining or reviewing history  , and Communicating with other healthcare professionals .   _        "

## 2024-03-08 ENCOUNTER — PATIENT OUTREACH (OUTPATIENT)
Dept: HEMATOLOGY ONCOLOGY | Facility: CLINIC | Age: 74
End: 2024-03-08

## 2024-03-08 NOTE — PROGRESS NOTES
Received a message from Dr. Crisostomo requesting help in moving up PET CT to help with procedural planning. ONN reached out to through tiger text to Liana in PET department and was able to move PET to 3/18/24.     Called patient introduced myself and role. Advised Dr. Crisostomo requested my assistance and getting her a sooner appointment for PET CT.  Patient offer new appointment date and time 03/18/24 at 0630. Patient accepted. ONN reviewed prep for PET CT. Patient verbalized understanding and expressed appreciation.

## 2024-03-12 ENCOUNTER — APPOINTMENT (OUTPATIENT)
Dept: LAB | Facility: CLINIC | Age: 74
End: 2024-03-12
Payer: MEDICARE

## 2024-03-12 ENCOUNTER — HOSPITAL ENCOUNTER (OUTPATIENT)
Dept: PULMONOLOGY | Facility: HOSPITAL | Age: 74
Discharge: HOME | End: 2024-03-12
Attending: STUDENT IN AN ORGANIZED HEALTH CARE EDUCATION/TRAINING PROGRAM
Payer: MEDICARE

## 2024-03-12 DIAGNOSIS — R91.8 MULTIPLE LUNG NODULES ON CT: ICD-10-CM

## 2024-03-12 DIAGNOSIS — D80.3 IGG DEFICIENCY (HCC): ICD-10-CM

## 2024-03-12 DIAGNOSIS — Z51.81 ENCOUNTER FOR MONITORING OF HYDROXYUREA THERAPY: ICD-10-CM

## 2024-03-12 DIAGNOSIS — Z87.891 HISTORY OF SMOKING: ICD-10-CM

## 2024-03-12 DIAGNOSIS — Z79.64 ENCOUNTER FOR MONITORING OF HYDROXYUREA THERAPY: ICD-10-CM

## 2024-03-12 DIAGNOSIS — D47.3 ESSENTIAL THROMBOCYTOSIS (HCC): ICD-10-CM

## 2024-03-12 DIAGNOSIS — Z15.89 JAK2 V617F MUTATION: ICD-10-CM

## 2024-03-12 LAB
ALBUMIN SERPL BCP-MCNC: 4.3 G/DL (ref 3.5–5)
ALP SERPL-CCNC: 47 U/L (ref 34–104)
ALT SERPL W P-5'-P-CCNC: 6 U/L (ref 7–52)
ANION GAP SERPL CALCULATED.3IONS-SCNC: 8 MMOL/L (ref 4–13)
AST SERPL W P-5'-P-CCNC: 11 U/L (ref 13–39)
BASOPHILS # BLD AUTO: 0.04 THOUSANDS/ÂΜL (ref 0–0.1)
BASOPHILS NFR BLD AUTO: 1 % (ref 0–1)
BILIRUB SERPL-MCNC: 0.4 MG/DL (ref 0.2–1)
BUN SERPL-MCNC: 14 MG/DL (ref 5–25)
CALCIUM SERPL-MCNC: 9.6 MG/DL (ref 8.4–10.2)
CHLORIDE SERPL-SCNC: 104 MMOL/L (ref 96–108)
CO2 SERPL-SCNC: 27 MMOL/L (ref 21–32)
CREAT SERPL-MCNC: 0.98 MG/DL (ref 0.6–1.3)
EOSINOPHIL # BLD AUTO: 0.21 THOUSAND/ÂΜL (ref 0–0.61)
EOSINOPHIL NFR BLD AUTO: 3 % (ref 0–6)
ERYTHROCYTE [DISTWIDTH] IN BLOOD BY AUTOMATED COUNT: 14.2 % (ref 11.6–15.1)
GFR SERPL CREATININE-BSD FRML MDRD: 57 ML/MIN/1.73SQ M
GLUCOSE SERPL-MCNC: 101 MG/DL (ref 65–140)
HCT VFR BLD AUTO: 38.4 % (ref 34.8–46.1)
HGB BLD-MCNC: 12.3 G/DL (ref 11.5–15.4)
IGA SERPL-MCNC: 134 MG/DL (ref 66–433)
IGG SERPL-MCNC: 608 MG/DL (ref 635–1741)
IGM SERPL-MCNC: 64 MG/DL (ref 45–281)
IMM GRANULOCYTES # BLD AUTO: 0.05 THOUSAND/UL (ref 0–0.2)
IMM GRANULOCYTES NFR BLD AUTO: 1 % (ref 0–2)
LYMPHOCYTES # BLD AUTO: 0.89 THOUSANDS/ÂΜL (ref 0.6–4.47)
LYMPHOCYTES NFR BLD AUTO: 12 % (ref 14–44)
MCH RBC QN AUTO: 34.3 PG (ref 26.8–34.3)
MCHC RBC AUTO-ENTMCNC: 32 G/DL (ref 31.4–37.4)
MCV RBC AUTO: 107 FL (ref 82–98)
MONOCYTES # BLD AUTO: 0.62 THOUSAND/ÂΜL (ref 0.17–1.22)
MONOCYTES NFR BLD AUTO: 9 % (ref 4–12)
NEUTROPHILS # BLD AUTO: 5.47 THOUSANDS/ÂΜL (ref 1.85–7.62)
NEUTS SEG NFR BLD AUTO: 74 % (ref 43–75)
NRBC BLD AUTO-RTO: 0 /100 WBCS
PLATELET # BLD AUTO: 494 THOUSANDS/UL (ref 149–390)
PMV BLD AUTO: 9 FL (ref 8.9–12.7)
POTASSIUM SERPL-SCNC: 4.1 MMOL/L (ref 3.5–5.3)
PROT SERPL-MCNC: 7.2 G/DL (ref 6.4–8.4)
RBC # BLD AUTO: 3.59 MILLION/UL (ref 3.81–5.12)
SODIUM SERPL-SCNC: 139 MMOL/L (ref 135–147)
WBC # BLD AUTO: 7.28 THOUSAND/UL (ref 4.31–10.16)

## 2024-03-12 PROCEDURE — 94618 PULMONARY STRESS TESTING: CPT | Performed by: INTERNAL MEDICINE

## 2024-03-12 PROCEDURE — 94060 EVALUATION OF WHEEZING: CPT

## 2024-03-12 PROCEDURE — 85025 COMPLETE CBC W/AUTO DIFF WBC: CPT

## 2024-03-12 PROCEDURE — 82784 ASSAY IGA/IGD/IGG/IGM EACH: CPT

## 2024-03-12 PROCEDURE — 94729 DIFFUSING CAPACITY: CPT | Performed by: INTERNAL MEDICINE

## 2024-03-12 PROCEDURE — 36415 COLL VENOUS BLD VENIPUNCTURE: CPT

## 2024-03-12 PROCEDURE — 94726 PLETHYSMOGRAPHY LUNG VOLUMES: CPT | Performed by: INTERNAL MEDICINE

## 2024-03-12 PROCEDURE — 94729 DIFFUSING CAPACITY: CPT

## 2024-03-12 PROCEDURE — 94726 PLETHYSMOGRAPHY LUNG VOLUMES: CPT

## 2024-03-12 PROCEDURE — 94060 EVALUATION OF WHEEZING: CPT | Performed by: INTERNAL MEDICINE

## 2024-03-12 PROCEDURE — 80053 COMPREHEN METABOLIC PANEL: CPT

## 2024-03-12 PROCEDURE — 94618 PULMONARY STRESS TESTING: CPT

## 2024-03-12 RX ORDER — ALBUTEROL SULFATE 2.5 MG/3ML
2.5 SOLUTION RESPIRATORY (INHALATION) ONCE
Status: COMPLETED | OUTPATIENT
Start: 2024-03-12 | End: 2024-03-12

## 2024-03-12 RX ADMIN — ALBUTEROL SULFATE 2.5 MG: 2.5 SOLUTION RESPIRATORY (INHALATION) at 12:13

## 2024-03-13 DIAGNOSIS — I10 HTN (HYPERTENSION), BENIGN: ICD-10-CM

## 2024-03-13 RX ORDER — LOSARTAN POTASSIUM 50 MG/1
50 TABLET ORAL DAILY
Qty: 90 TABLET | Refills: 1 | Status: SHIPPED | OUTPATIENT
Start: 2024-03-13

## 2024-03-13 RX ORDER — AMLODIPINE BESYLATE 5 MG/1
5 TABLET ORAL 2 TIMES DAILY
Qty: 180 TABLET | Refills: 1 | Status: SHIPPED | OUTPATIENT
Start: 2024-03-13

## 2024-03-13 NOTE — TELEPHONE ENCOUNTER
Patient called back because call dropped during previous conversation. All information regarding her refill requests for amlodipine and losartan are confirmed.

## 2024-03-13 NOTE — TELEPHONE ENCOUNTER
Reason for call:   [x] Refill   [] Prior Auth  [] Other:     Office:   [x] PCP/Provider -   [] Specialty/Provider -     AMLODIPINE  5 MG    LOSARTAN  50 MG    [id number] - [place] [facility] [place] - [address]  [address] [facility] [place] [address]  Phone: [phone]  Fax: [phone]     Does the patient have enough for 3 days?   [x] Yes   [] No - Send as HP to POD

## 2024-03-18 ENCOUNTER — TELEPHONE (OUTPATIENT)
Dept: PULMONOLOGY | Facility: MEDICAL CENTER | Age: 74
End: 2024-03-18

## 2024-03-18 ENCOUNTER — TELEPHONE (OUTPATIENT)
Age: 74
End: 2024-03-18

## 2024-03-18 ENCOUNTER — HOSPITAL ENCOUNTER (OUTPATIENT)
Dept: RADIOLOGY | Age: 74
Discharge: HOME | End: 2024-03-18

## 2024-03-18 ENCOUNTER — TELEPHONE (OUTPATIENT)
Dept: HEMATOLOGY ONCOLOGY | Facility: CLINIC | Age: 74
End: 2024-03-18

## 2024-03-18 DIAGNOSIS — R59.0 PARATRACHEAL LYMPHADENOPATHY: ICD-10-CM

## 2024-03-18 DIAGNOSIS — E78.2 MIXED HYPERLIPIDEMIA: ICD-10-CM

## 2024-03-18 DIAGNOSIS — Z87.891 HISTORY OF SMOKING: ICD-10-CM

## 2024-03-18 DIAGNOSIS — R91.8 MULTIPLE LUNG NODULES ON CT: ICD-10-CM

## 2024-03-18 DIAGNOSIS — D38.1 NEOPLASM OF UNCERTAIN BEHAVIOR OF RIGHT UPPER LOBE OF LUNG: ICD-10-CM

## 2024-03-18 RX ORDER — ROSUVASTATIN CALCIUM 5 MG/1
TABLET, COATED ORAL
Qty: 90 TABLET | Refills: 1 | Status: SHIPPED | OUTPATIENT
Start: 2024-03-18

## 2024-03-18 NOTE — TELEPHONE ENCOUNTER
Discussed results of PFTs with the patient. Patient was delayed in obtaining a PET scan due to a malfunction of the scanner. Plan to move forward with EBUS scheduling. Patient to have PET scan later this week.      Rogelio Crisostomo MD  Pulmonary and Critical Care

## 2024-03-18 NOTE — TELEPHONE ENCOUNTER
Patient Call    Who are you speaking with? Patient    If it is not the patient, are they listed on an active communication consent form? N/A   What is the reason for this call? Pt is calling in regards to her upcoming appt with Dr Castro next Monday 3/25/24 at 8:00 AM. Pt was supposed to have the PET scan today, 3/18/24, however when she showed up for the scan, they had to reschedule her for Friday 3/22/24 at 6:30 AM. Pt is asking if she should keep the appt on 3/25/24 with Dr Castro? I offered pt to reschedule but she wanted to get Dr Castro's opinion on it. I did schedule her for an appt on 4/8/24 at 9:20 AM just in case Dr Castro did want her to reschedule. Pt would like a call back as to which appt she should keep.    Does this require a call back? Yes   If a call back is required, please list best call back number [phone]   If a call back is required, advise that a message will be forwarded to their care team and someone will return their call as soon as possible.   Did you relay this information to the patient? Yes

## 2024-03-18 NOTE — TELEPHONE ENCOUNTER
Called patient and made her aware that Dr. Castro will want the PET scan results at the appt and with the scan being done on 3/22 we are not certain if the results would be back in time. Dr. Castro ok with seeing the patient on 4/8. Patient agreeable to plan. Patient's 3/25 appt was cancelled.

## 2024-03-18 NOTE — TELEPHONE ENCOUNTER
Ayla is calling regarding her results of her PFT . Patient also wanted  to know her PET scan was moved to this Friday since the PET scan was not working today when she arrived .     Please advise [phone]

## 2024-03-19 ENCOUNTER — PREP FOR PROCEDURE (OUTPATIENT)
Dept: PULMONOLOGY | Facility: CLINIC | Age: 74
End: 2024-03-19

## 2024-03-19 DIAGNOSIS — R59.0 PARATRACHEAL LYMPHADENOPATHY: Primary | ICD-10-CM

## 2024-03-22 ENCOUNTER — TELEPHONE (OUTPATIENT)
Dept: HEMATOLOGY ONCOLOGY | Facility: CLINIC | Age: 74
End: 2024-03-22

## 2024-03-22 ENCOUNTER — HOSPITAL ENCOUNTER (OUTPATIENT)
Dept: RADIOLOGY | Age: 74
Discharge: HOME | End: 2024-03-22
Payer: MEDICARE

## 2024-03-22 LAB — GLUCOSE SERPL-MCNC: 94 MG/DL (ref 65–140)

## 2024-03-22 PROCEDURE — 82948 REAGENT STRIP/BLOOD GLUCOSE: CPT

## 2024-03-22 PROCEDURE — 78815 PET IMAGE W/CT SKULL-THIGH: CPT

## 2024-03-22 PROCEDURE — A9552 F18 FDG: HCPCS

## 2024-03-26 ENCOUNTER — ANESTHESIA (OUTPATIENT)
Dept: GASTROENTEROLOGY | Facility: HOSPITAL | Age: 74
End: 2024-03-26

## 2024-03-26 ENCOUNTER — HOSPITAL ENCOUNTER (OUTPATIENT)
Dept: GASTROENTEROLOGY | Facility: HOSPITAL | Age: 74
Setting detail: OUTPATIENT SURGERY
Discharge: HOME | End: 2024-03-26
Attending: STUDENT IN AN ORGANIZED HEALTH CARE EDUCATION/TRAINING PROGRAM
Payer: MEDICARE

## 2024-03-26 ENCOUNTER — ANESTHESIA EVENT (OUTPATIENT)
Dept: GASTROENTEROLOGY | Facility: HOSPITAL | Age: 74
End: 2024-03-26

## 2024-03-26 VITALS
DIASTOLIC BLOOD PRESSURE: 74 MMHG | RESPIRATION RATE: 18 BRPM | OXYGEN SATURATION: 97 % | TEMPERATURE: 97 F | HEART RATE: 68 BPM | BODY MASS INDEX: 20.61 KG/M2 | HEIGHT: 62 IN | SYSTOLIC BLOOD PRESSURE: 164 MMHG | WEIGHT: 112 LBS

## 2024-03-26 DIAGNOSIS — R59.0 PARATRACHEAL LYMPHADENOPATHY: ICD-10-CM

## 2024-03-26 PROCEDURE — 88341 IMHCHEM/IMCYTCHM EA ADD ANTB: CPT | Performed by: STUDENT IN AN ORGANIZED HEALTH CARE EDUCATION/TRAINING PROGRAM

## 2024-03-26 PROCEDURE — 88342 IMHCHEM/IMCYTCHM 1ST ANTB: CPT | Performed by: STUDENT IN AN ORGANIZED HEALTH CARE EDUCATION/TRAINING PROGRAM

## 2024-03-26 PROCEDURE — 88305 TISSUE EXAM BY PATHOLOGIST: CPT | Performed by: STUDENT IN AN ORGANIZED HEALTH CARE EDUCATION/TRAINING PROGRAM

## 2024-03-26 PROCEDURE — 88173 CYTOPATH EVAL FNA REPORT: CPT | Performed by: STUDENT IN AN ORGANIZED HEALTH CARE EDUCATION/TRAINING PROGRAM

## 2024-03-26 PROCEDURE — 31622 DX BRONCHOSCOPE/WASH: CPT | Performed by: STUDENT IN AN ORGANIZED HEALTH CARE EDUCATION/TRAINING PROGRAM

## 2024-03-26 PROCEDURE — 88184 FLOWCYTOMETRY/ TC 1 MARKER: CPT | Performed by: STUDENT IN AN ORGANIZED HEALTH CARE EDUCATION/TRAINING PROGRAM

## 2024-03-26 PROCEDURE — 88185 FLOWCYTOMETRY/TC ADD-ON: CPT

## 2024-03-26 PROCEDURE — 88172 CYTP DX EVAL FNA 1ST EA SITE: CPT | Performed by: STUDENT IN AN ORGANIZED HEALTH CARE EDUCATION/TRAINING PROGRAM

## 2024-03-26 RX ORDER — PROPOFOL 10 MG/ML
INJECTION, EMULSION INTRAVENOUS CONTINUOUS PRN
Status: DISCONTINUED | OUTPATIENT
Start: 2024-03-26 | End: 2024-03-26

## 2024-03-26 RX ORDER — ONDANSETRON 2 MG/ML
INJECTION INTRAMUSCULAR; INTRAVENOUS AS NEEDED
Status: DISCONTINUED | OUTPATIENT
Start: 2024-03-26 | End: 2024-03-26

## 2024-03-26 RX ORDER — DEXAMETHASONE SODIUM PHOSPHATE 10 MG/ML
INJECTION, SOLUTION INTRAMUSCULAR; INTRAVENOUS AS NEEDED
Status: DISCONTINUED | OUTPATIENT
Start: 2024-03-26 | End: 2024-03-26

## 2024-03-26 RX ORDER — ROCURONIUM BROMIDE 10 MG/ML
INJECTION, SOLUTION INTRAVENOUS AS NEEDED
Status: DISCONTINUED | OUTPATIENT
Start: 2024-03-26 | End: 2024-03-26

## 2024-03-26 RX ORDER — FENTANYL CITRATE 50 UG/ML
INJECTION, SOLUTION INTRAMUSCULAR; INTRAVENOUS AS NEEDED
Status: DISCONTINUED | OUTPATIENT
Start: 2024-03-26 | End: 2024-03-26

## 2024-03-26 RX ORDER — LIDOCAINE HYDROCHLORIDE 10 MG/ML
INJECTION, SOLUTION EPIDURAL; INFILTRATION; INTRACAUDAL; PERINEURAL AS NEEDED
Status: DISCONTINUED | OUTPATIENT
Start: 2024-03-26 | End: 2024-03-26

## 2024-03-26 RX ORDER — SODIUM CHLORIDE, SODIUM LACTATE, POTASSIUM CHLORIDE, CALCIUM CHLORIDE 600; 310; 30; 20 MG/100ML; MG/100ML; MG/100ML; MG/100ML
INJECTION, SOLUTION INTRAVENOUS CONTINUOUS PRN
Status: DISCONTINUED | OUTPATIENT
Start: 2024-03-26 | End: 2024-03-26

## 2024-03-26 RX ORDER — PROPOFOL 10 MG/ML
INJECTION, EMULSION INTRAVENOUS AS NEEDED
Status: DISCONTINUED | OUTPATIENT
Start: 2024-03-26 | End: 2024-03-26

## 2024-03-26 RX ORDER — SODIUM CHLORIDE, SODIUM LACTATE, POTASSIUM CHLORIDE, CALCIUM CHLORIDE 600; 310; 30; 20 MG/100ML; MG/100ML; MG/100ML; MG/100ML
50 INJECTION, SOLUTION INTRAVENOUS CONTINUOUS
Status: DISCONTINUED | OUTPATIENT
Start: 2024-03-26 | End: 2024-03-30 | Stop reason: HOSPADM

## 2024-03-26 RX ADMIN — LIDOCAINE HYDROCHLORIDE 50 MG: 10 INJECTION, SOLUTION EPIDURAL; INFILTRATION; INTRACAUDAL at 11:16

## 2024-03-26 RX ADMIN — ONDANSETRON 4 MG: 2 INJECTION INTRAMUSCULAR; INTRAVENOUS at 11:16

## 2024-03-26 RX ADMIN — FENTANYL CITRATE 50 MCG: 50 INJECTION INTRAMUSCULAR; INTRAVENOUS at 11:20

## 2024-03-26 RX ADMIN — SODIUM CHLORIDE, SODIUM LACTATE, POTASSIUM CHLORIDE, AND CALCIUM CHLORIDE: .6; .31; .03; .02 INJECTION, SOLUTION INTRAVENOUS at 09:53

## 2024-03-26 RX ADMIN — DEXAMETHASONE SODIUM PHOSPHATE 10 MG: 10 INJECTION INTRAMUSCULAR; INTRAVENOUS at 11:16

## 2024-03-26 RX ADMIN — PROPOFOL 120 MCG/KG/MIN: 10 INJECTION, EMULSION INTRAVENOUS at 11:18

## 2024-03-26 RX ADMIN — ROCURONIUM BROMIDE 50 MG: 10 INJECTION, SOLUTION INTRAVENOUS at 11:16

## 2024-03-26 RX ADMIN — PROPOFOL 150 MG: 10 INJECTION, EMULSION INTRAVENOUS at 11:16

## 2024-03-26 RX ADMIN — SUGAMMADEX 120 MG: 100 INJECTION, SOLUTION INTRAVENOUS at 12:06

## 2024-03-26 RX ADMIN — FENTANYL CITRATE 50 MCG: 50 INJECTION INTRAMUSCULAR; INTRAVENOUS at 11:16

## 2024-03-26 NOTE — ANESTHESIA POSTPROCEDURE EVALUATION
Post-Op Assessment Note    CV Status:  Stable    Pain management: satisfactory to patient       Mental Status:  Alert, awake and sleepy   Hydration Status:  Euvolemic   PONV Controlled:  Controlled   Airway Patency:  Patent     Post Op Vitals Reviewed: Yes    No anethesia notable event occurred.    Staff: Anesthesiologist, CRNA               BP   124/65   Temp   97.7   Pulse  70   Resp   12   SpO2   100

## 2024-03-26 NOTE — ANESTHESIA PREPROCEDURE EVALUATION
Procedure:  ENDOBRONCHIAL ULTRASOUND (EBUS)    Relevant Problems   ANESTHESIA (within normal limits)      CARDIO   (+) HTN (hypertension), benign   (+) Mixed hyperlipidemia      /RENAL   (+) Stage 3b chronic kidney disease (CKD) (HCC)      MUSCULOSKELETAL   (+) Psoriatic arthritis (HCC)        Physical Exam    Airway    Mallampati score: III  TM Distance: >3 FB  Neck ROM: full     Dental        Cardiovascular  Rate: normal    Pulmonary  Pulmonary exam normal     Other Findings  Per pt denies anything remaining that is loose or removeablepost-pubertal.      Anesthesia Plan  ASA Score- 3     Anesthesia Type- general with ASA Monitors.         Additional Monitors:     Airway Plan: ETT.           Plan Factors-Exercise tolerance (METS): >4 METS.    Chart reviewed.    Patient summary reviewed.    Patient is not a current smoker.              Induction- intravenous.    Postoperative Plan- Plan for postoperative opioid use.     Informed Consent- Anesthetic plan and risks discussed with patient.  I personally reviewed this patient with the CRNA. Discussed and agreed on the Anesthesia Plan with the CRNA..

## 2024-03-26 NOTE — INTERVAL H&P NOTE
H&P reviewed. After examining the patient I find no changes in the patients condition since the H&P had been written.    Vitals:    03/26/24 0946   BP: 123/64   Pulse: 75   Resp: 16   Temp: 97.9 °F (36.6 °C)   SpO2: 99%     Patient signed consent  Proceed under general anaesthesia  EBUS

## 2024-03-26 NOTE — RESPIRATORY THERAPY NOTE
Assisted EBUS with Dr. Tellez. Anesthesia present for sedation and airway. Lab present for lymph node sampling. 19 and 21 gauge needles used. Approx. 50 ml of 0.9% sodium chloride used for washing post sampling. No issues throughout procedure and all samples sent christian with .

## 2024-03-27 ENCOUNTER — PATIENT OUTREACH (OUTPATIENT)
Dept: HEMATOLOGY ONCOLOGY | Facility: CLINIC | Age: 74
End: 2024-03-27

## 2024-03-27 DIAGNOSIS — C34.91 NON-SMALL CELL CANCER OF RIGHT LUNG (HCC): ICD-10-CM

## 2024-03-27 DIAGNOSIS — R59.0 PARATRACHEAL LYMPHADENOPATHY: Primary | ICD-10-CM

## 2024-03-27 NOTE — PROGRESS NOTES
"Called patient for initial outreach from nurse navigator. Introduced myself and my role. Reviewed need to changed upcoming appointment with medical oncology as she is scheduled in a 20 min appointment and it needs to be longer. Patient now scheduled for 4/10/24 at .  She would like treatment at [facility].  Bailey Elizabeth RN aware.        What is your understanding of your diagnosis? \"Cytology on site showed positive node. Cancer cells in one node. I am confused as the mass was on the other side.\"  ONN reviewed EBUS note mass on right and preliminary positive node was on the right. Verbalized understanding.     Advised patient I will outreach Dr. Tellez for MRI brain:  MRI Information :  Patient Height: 5.2  Weight: 112 lb  Pacemaker:Defibrillator: No  Surgical metal in body? No  (Shunts, filters, coils, clips, etc?)  n/a  Injury to eyes involving metal?  no  Struck by bullets or shrapnel? no  Previous study/MRI? Yes  (This body part) Date : 02/10/2015  Diabetes: no  Kidney or liver disease?  yes Stage III   Kidney or liver transplant?  no  On Dialysis? No  Recent BUN/Creatinine? yes 03/12/24  (Within 12 weeks)  High blood pressure? yes - On medication   Claustrophobia? No  Shunt? No  Port? No  Is patient able to stand without assistance? Yes      "

## 2024-03-28 ENCOUNTER — HOSPITAL ENCOUNTER (OUTPATIENT)
Dept: RADIOLOGY | Facility: HOSPITAL | Age: 74
Discharge: HOME | End: 2024-03-28
Attending: STUDENT IN AN ORGANIZED HEALTH CARE EDUCATION/TRAINING PROGRAM
Payer: MEDICARE

## 2024-03-28 ENCOUNTER — DOCUMENTATION (OUTPATIENT)
Dept: HEMATOLOGY ONCOLOGY | Facility: CLINIC | Age: 74
End: 2024-03-28

## 2024-03-28 ENCOUNTER — PATIENT OUTREACH (OUTPATIENT)
Dept: HEMATOLOGY ONCOLOGY | Facility: CLINIC | Age: 74
End: 2024-03-28

## 2024-03-28 DIAGNOSIS — C34.91 NSCLC OF RIGHT LUNG (HCC): Primary | ICD-10-CM

## 2024-03-28 DIAGNOSIS — C34.91 NON-SMALL CELL CANCER OF RIGHT LUNG (HCC): ICD-10-CM

## 2024-03-28 PROCEDURE — A9585 GADOBUTROL INJECTION: HCPCS | Performed by: STUDENT IN AN ORGANIZED HEALTH CARE EDUCATION/TRAINING PROGRAM

## 2024-03-28 PROCEDURE — 70553 MRI BRAIN STEM W/O & W/DYE: CPT

## 2024-03-28 RX ORDER — GADOBUTROL 604.72 MG/ML
5 INJECTION INTRAVENOUS
Status: COMPLETED | OUTPATIENT
Start: 2024-03-28 | End: 2024-03-28

## 2024-03-28 RX ADMIN — GADOBUTROL 5 ML: 604.72 INJECTION INTRAVENOUS at 19:57

## 2024-03-28 NOTE — PROGRESS NOTES
Intake received/ Chart reviewed for work up completed     Imaging completed: 03/22/24 PET CT  02/28/24 CT chest wo contrast     Pathology completed:  03/26/24  at [facility] pending    All records needed are in patients chart. No records retrieval needed at this time.    Routing to care coordinator for scheduling of:  MRI brain w wo contrast  - [facility] prior to Med/onc appointment on 4/10/24      OCC to notify Nurse Navigator once scheduling complete. Nurse Navigator will provide appointment info upon outreach.      Advised patient I will outreach Dr. Tellez for MRI brain:  MRI Information :  Patient Height: 5.2  Weight: 112 lb  Pacemaker:Defibrillator: No  Surgical metal in body? No  (Shunts, filters, coils, clips, etc?)  n/a  Injury to eyes involving metal?  no  Struck by bullets or shrapnel? no  Previous study/MRI? Yes  (This body part) Date : 02/10/2015  Diabetes: no  Kidney or liver disease?  yes Stage III   Kidney or liver transplant?  no  On Dialysis? No  Recent BUN/Creatinine? yes 03/12/24  (Within 12 weeks)  High blood pressure? yes - On medication   Claustrophobia? No  Shunt? No  Port? No  Is patient able to stand without assistance? Yes       Patient is taking Hydrea

## 2024-03-28 NOTE — PROGRESS NOTES
Oncology Nurse Navigator:    Called patient for initial outreach from nurse navigator. Introduced myself and my role. Reviewed upcoming appointments.  Assessed barriers of care.     Patient lives alone. Denies any transportation issues at this time. Reports ex-, daughter and friends can help with transportation. Patient is aware of [facility] transportation should this change. OSW referral placed.     RN Initial Assessment     Do yo have transportation for initial appts? Yes    Will someone be coming with you? Girlfriend     Review of Communication consent and update as needed.     How do you prefer to receive information? Verbal both written    Family history of cancer? Brother - throat   Interested in speaking with oncology Genetics?No      Systems Assessment     History of smoking 1 ppd for 40 years, quit 15 years ago.     Any new cough or change in existing cough? Slight cough, throat clearing since EBUS    Dry/Productive? No    Phlegm? Post nasal drip - clear    Any new shortness of breath  or change shortness of breath? No    With activity? No    At rest? No    Painful/Difficulty breathing? No    Wheezing? No    Chronic lung infections? Pneumonia October 2023 February 24    Hoarseness when you speak? No    Oxygen? No    Any Chest pain? No    Are you having any pain (back/shoulder/rib/bone pain)? No (Referral to Palliative)     Location:     Rate: 1-10     Characteristics: Sharp, stabbing, dull     Frequency: Intermittent, Constant, Occasional     Is there any thing that makes it worse?  Makes it better?     Interventions:     Relief?     Any unexplained weight loss or decrease in appetite? Fair, 12 lbs - declines nutritional referral     Nausea/vomiting? No    Any fatigue or weakness? No    Can you get around independently? Yes     Assistance with ADLs?  No    How much time during the day do you spend sitting/lying down? Sitting more now - just retired due to pneumonia     What prohibits you from activity?      Are you experiencing any anxiety/depression that is new or worsening?  Anxiety related to diagnosis     Do you have any vision changes/headaches/dizziness/numbness/altered mental status/increase in forgetfulness? No - needs  cataract surgery    Any precipitating factors?     Any swelling new or worsening?  No    Do you have a PCP that you are established with? Yes - Dr. Torres    Information provided on Cancer Support Community along with my contact information and OCC contact information via [employer] message.  Patient  is aware she can reach out with any questions.   Patient expresses appreciation of phone call.

## 2024-03-29 ENCOUNTER — PATIENT OUTREACH (OUTPATIENT)
Dept: CASE MANAGEMENT | Facility: HOSPITAL | Age: 74
End: 2024-03-29

## 2024-03-29 ENCOUNTER — PATIENT OUTREACH (OUTPATIENT)
Dept: HEMATOLOGY ONCOLOGY | Facility: CLINIC | Age: 74
End: 2024-03-29

## 2024-03-29 ENCOUNTER — TELEPHONE (OUTPATIENT)
Dept: PULMONOLOGY | Facility: CLINIC | Age: 74
End: 2024-03-29

## 2024-03-29 PROBLEM — C34.91 PRIMARY LUNG ADENOCARCINOMA, RIGHT (HCC): Status: ACTIVE | Noted: 2024-03-29

## 2024-03-29 NOTE — TELEPHONE ENCOUNTER
Pulmonary    I called patient and alerted her of her diagnosis based on the EBUS of lung adenocarcinoma.     She has a follow-up with oncology April 10.  She completed her MRI and PET scan already.  Lung cancer navigator is plugged in as well.    Kallie Rucker MD  Pulmonary and Critical Care Medicine

## 2024-03-29 NOTE — PROGRESS NOTES
Contacted central scheduling as directed by POWER Mckenna to schedule brain mri . POWER Mckenna will be reaching out to patient to provide testing information.    Type of appointment-Brain MRI   Hnta-0-  Time-7:15pm  Location-[place]

## 2024-03-30 LAB — SCAN RESULT: NORMAL

## 2024-04-01 DIAGNOSIS — C77.8 NON-SMALL CELL LUNG CANCER METASTATIC TO LYMPH NODES OF MULTIPLE SITES (HCC): Primary | ICD-10-CM

## 2024-04-01 DIAGNOSIS — C34.90 NON-SMALL CELL LUNG CANCER METASTATIC TO LYMPH NODES OF MULTIPLE SITES (HCC): Primary | ICD-10-CM

## 2024-04-05 ENCOUNTER — APPOINTMENT (OUTPATIENT)
Dept: LAB | Facility: HOSPITAL | Age: 74
End: 2024-04-05
Payer: MEDICARE

## 2024-04-05 DIAGNOSIS — Z51.81 ENCOUNTER FOR MONITORING OF HYDROXYUREA THERAPY: ICD-10-CM

## 2024-04-05 DIAGNOSIS — Z15.89 JAK2 V617F MUTATION: ICD-10-CM

## 2024-04-05 DIAGNOSIS — Z79.64 ENCOUNTER FOR MONITORING OF HYDROXYUREA THERAPY: ICD-10-CM

## 2024-04-05 DIAGNOSIS — D47.3 ESSENTIAL THROMBOCYTOSIS (HCC): ICD-10-CM

## 2024-04-05 LAB
BASOPHILS # BLD AUTO: 0.07 THOUSANDS/ÂΜL (ref 0–0.1)
BASOPHILS NFR BLD AUTO: 1 % (ref 0–1)
EOSINOPHIL # BLD AUTO: 0.11 THOUSAND/ÂΜL (ref 0–0.61)
EOSINOPHIL NFR BLD AUTO: 2 % (ref 0–6)
ERYTHROCYTE [DISTWIDTH] IN BLOOD BY AUTOMATED COUNT: 13.5 % (ref 11.6–15.1)
HCT VFR BLD AUTO: 34.5 % (ref 34.8–46.1)
HGB BLD-MCNC: 11.2 G/DL (ref 11.5–15.4)
IMM GRANULOCYTES # BLD AUTO: 0.04 THOUSAND/UL (ref 0–0.2)
IMM GRANULOCYTES NFR BLD AUTO: 1 % (ref 0–2)
LYMPHOCYTES # BLD AUTO: 0.7 THOUSANDS/ÂΜL (ref 0.6–4.47)
LYMPHOCYTES NFR BLD AUTO: 11 % (ref 14–44)
MCH RBC QN AUTO: 34.5 PG (ref 26.8–34.3)
MCHC RBC AUTO-ENTMCNC: 32.5 G/DL (ref 31.4–37.4)
MCV RBC AUTO: 106 FL (ref 82–98)
MONOCYTES # BLD AUTO: 0.82 THOUSAND/ÂΜL (ref 0.17–1.22)
MONOCYTES NFR BLD AUTO: 12 % (ref 4–12)
NEUTROPHILS # BLD AUTO: 4.95 THOUSANDS/ÂΜL (ref 1.85–7.62)
NEUTS SEG NFR BLD AUTO: 73 % (ref 43–75)
NRBC BLD AUTO-RTO: 0 /100 WBCS
PLATELET # BLD AUTO: 501 THOUSANDS/UL (ref 149–390)
PMV BLD AUTO: 8.9 FL (ref 8.9–12.7)
RBC # BLD AUTO: 3.25 MILLION/UL (ref 3.81–5.12)
WBC # BLD AUTO: 6.69 THOUSAND/UL (ref 4.31–10.16)

## 2024-04-05 PROCEDURE — 85025 COMPLETE CBC W/AUTO DIFF WBC: CPT

## 2024-04-05 PROCEDURE — 36415 COLL VENOUS BLD VENIPUNCTURE: CPT

## 2024-04-09 ENCOUNTER — OFFICE VISIT (OUTPATIENT)
Dept: FAMILY MEDICINE CLINIC | Facility: CLINIC | Age: 74
End: 2024-04-09
Payer: MEDICARE

## 2024-04-09 VITALS
DIASTOLIC BLOOD PRESSURE: 80 MMHG | TEMPERATURE: 98 F | WEIGHT: 114.4 LBS | BODY MASS INDEX: 21.05 KG/M2 | RESPIRATION RATE: 18 BRPM | OXYGEN SATURATION: 98 % | SYSTOLIC BLOOD PRESSURE: 104 MMHG | HEART RATE: 82 BPM | HEIGHT: 62 IN

## 2024-04-09 DIAGNOSIS — M79.604 BILATERAL LEG PAIN: Primary | ICD-10-CM

## 2024-04-09 DIAGNOSIS — M79.605 BILATERAL LEG PAIN: Primary | ICD-10-CM

## 2024-04-09 DIAGNOSIS — G47.00 INSOMNIA, UNSPECIFIED TYPE: ICD-10-CM

## 2024-04-09 PROCEDURE — 99213 OFFICE O/P EST LOW 20 MIN: CPT

## 2024-04-09 PROCEDURE — G2211 COMPLEX E/M VISIT ADD ON: HCPCS

## 2024-04-09 NOTE — ASSESSMENT & PLAN NOTE
Tylenol as needed.  If it is secondary to rosuvastatin it will take a little time to get better.  If it is related to lung cancer she has to talk to the oncologist.  If does not get better or get worse get back to us otherwise follow-up as needed

## 2024-04-09 NOTE — PROGRESS NOTES
Assessment/Plan:         Problem List Items Addressed This Visit     Bilateral leg pain - Primary     Tylenol as needed.  If it is secondary to rosuvastatin it will take a little time to get better.  If it is related to lung cancer she has to talk to the oncologist.  If does not get better or get worse get back to us otherwise follow-up as needed         Insomnia     Okay to try melatonin start with 3 mg daily may raised up to 10 mg daily              Subjective:      Patient ID: Ayla Nye is a 73 y.o. female.    Patient here for sick visit.  Complaining of some weakness and pain both lower extremity which is somewhat better since he stopped taking rosuvastatin.  She is also have an appointment with oncologist for lung cancer.  Patient also have a hard time sleeping wanted to know whether she can try melatonin.  No chest pain or shortness of breath.  No abdomen pain, nausea, vomiting, fever or chills.  No lightheadedness or dizziness.  No other complaints at present time        The following portions of the patient's history were reviewed and updated as appropriate:   Past Medical History:  She has a past medical history of Allergic (2022), Essential thrombocytosis (HCC), Hyperlipidemia, Hypertension, Mass in chest, Nodular goiter, and Psoriasis.,  _______________________________________________________________________  Medical Problems:  does not have any pertinent problems on file.,  _______________________________________________________________________  Past Surgical History:   has a past surgical history that includes Appendectomy; Mammo needle localization right (all inc) (Right, 07/13/2009); Skin lesion excision; Colonoscopy (10/31/2018); Mammo (historical); DXA procedure(historical) (04/21/2017); and Breast cyst excision (Right, 2009).,  _______________________________________________________________________  Family History:  family history includes Cancer in her brother and brother; Coronary artery  disease in her brother; Depression in her mother; Hearing loss in her mother; Hypertension in her brother and mother; No Known Problems in her daughter, daughter, maternal aunt, maternal aunt, maternal aunt, maternal aunt, maternal grandfather, maternal grandmother, paternal aunt, paternal grandfather, and paternal grandmother; Stroke in her mother; Tuberculosis in her brother and father.,  _______________________________________________________________________  Social History:   reports that she has quit smoking. Her smoking use included cigarettes. She started smoking about 58 years ago. She has a 58.3 pack-year smoking history. She has never used smokeless tobacco. She reports that she does not currently use alcohol. She reports that she does not use drugs.,  _______________________________________________________________________  Allergies:  is allergic to doxycycline, keflex [cephalexin], and neomycin-polymyxin-dexameth..  _______________________________________________________________________  Current Outpatient Medications   Medication Sig Dispense Refill   • amLODIPine (NORVASC) 5 mg tablet Take 1 tablet (5 mg total) by mouth 2 (two) times a day 180 tablet 1   • Apremilast 30 MG TABS 30 mg Every 12 hours      • aspirin 81 MG tablet Take 81 mg by mouth daily      • Cholecalciferol (VITAMIN D3) 5000 units TABS 5,000 Units Daily      • diphenhydrAMINE (BENADRYL) 25 mg capsule Take 25 mg by mouth every 12 (twelve) hours as needed for itching      • Fluocinolone Acetonide Scalp 0.01 % OIL      • halobetasol (ULTRAVATE) 0.05 % ointment       • hydrocortisone 2.5 % ointment      • hydroxyurea (HYDREA) 500 mg capsule Take 1 capsule (500 mg total) by mouth daily 90 capsule 3   • losartan (COZAAR) 50 mg tablet Take 1 tablet (50 mg total) by mouth daily 90 tablet 1   • Probiotic Product (PROBIOTIC DAILY PO) Take 1 capsule by mouth daily     • vitamin B-12 (VITAMIN B-12) 1,000 mcg tablet Take 1 tablet (1,000 mcg total)  "by mouth daily 90 tablet 1     No current facility-administered medications for this visit.     _______________________________________________________________________  Review of Systems   Constitutional:  Negative for chills, fatigue and fever.   HENT:  Positive for postnasal drip. Negative for congestion, ear pain, rhinorrhea, sneezing and sore throat.    Eyes:  Negative for redness, itching and visual disturbance.   Respiratory:  Negative for cough, chest tightness and shortness of breath.    Cardiovascular:  Negative for chest pain, palpitations and leg swelling.   Gastrointestinal:  Negative for abdominal pain, blood in stool, diarrhea, nausea and vomiting.   Endocrine: Negative for cold intolerance and heat intolerance.   Genitourinary:  Negative for dysuria, frequency and urgency.   Musculoskeletal:  Positive for myalgias (Both lower extremities). Negative for arthralgias and back pain.   Skin:  Negative for color change and rash.   Neurological:  Negative for dizziness, weakness, light-headedness, numbness and headaches.   Hematological:  Does not bruise/bleed easily.   Psychiatric/Behavioral:  Negative for agitation, behavioral problems and confusion.          Objective:  Vitals:    04/09/24 1052   BP: 104/80   BP Location: Left arm   Patient Position: Sitting   Cuff Size: Standard   Pulse: 82   Resp: 18   Temp: 98 °F (36.7 °C)   TempSrc: Tympanic   SpO2: 98%   Weight: 51.9 kg (114 lb 6.4 oz)   Height: 5' 2\" (1.575 m)     Body mass index is 20.92 kg/m².     Physical Exam  Vitals and nursing note reviewed.   Constitutional:       General: She is not in acute distress.     Appearance: Normal appearance. She is not ill-appearing, toxic-appearing or diaphoretic.   HENT:      Head: Normocephalic and atraumatic.      Nose: Nose normal. No congestion.      Mouth/Throat:      Mouth: Mucous membranes are moist.   Eyes:      General: No scleral icterus.        Right eye: No discharge.         Left eye: No discharge. "      Extraocular Movements: Extraocular movements intact.      Conjunctiva/sclera: Conjunctivae normal.      Pupils: Pupils are equal, round, and reactive to light.   Cardiovascular:      Rate and Rhythm: Normal rate and regular rhythm.      Pulses: Normal pulses.      Heart sounds: Normal heart sounds. No murmur heard.     No gallop.   Pulmonary:      Effort: Pulmonary effort is normal. No respiratory distress.      Breath sounds: Normal breath sounds. No wheezing, rhonchi or rales.   Abdominal:      General: Abdomen is flat. Bowel sounds are normal. There is no distension.      Palpations: Abdomen is soft.      Tenderness: There is no abdominal tenderness. There is no guarding.   Musculoskeletal:         General: No swelling or tenderness. Normal range of motion.      Cervical back: Normal range of motion and neck supple. No rigidity.   Lymphadenopathy:      Cervical: No cervical adenopathy.   Skin:     General: Skin is warm.      Capillary Refill: Capillary refill takes 2 to 3 seconds.      Coloration: Skin is not jaundiced.      Findings: No bruising or rash.   Neurological:      General: No focal deficit present.      Mental Status: She is alert and oriented to person, place, and time. Mental status is at baseline.      Gait: Gait normal.   Psychiatric:         Mood and Affect: Mood normal.

## 2024-04-09 NOTE — PROGRESS NOTES
"HEMATOLOGY / ONCOLOGY CLINIC FOLLOW UP NOTE    Patient Ayla Nye  MRN: [id number]  : 1950  Date of Encounter 4/10/2024      Referring Provider:  JERE Hale 3/5/2024      Reason for Encounter: follow up ET and new lung mass         Discussion     Probable cT2a N2cM0 Stage IIIA adenocarcinoma lung    Caris pending      Probable Stage IIIA NSCLC      The optimal therapy of resectable Stage IIIA NSCLC consists of systemic treatment combined with local approach with radiation and/or surgery.  Strategies include surgery followed by adjuvant chemotherapy  neoadjuvant chemotherapy or chemoimmunotherapy followed by surgery, neoadjuvant chemoradiotherapy followed by surgery or concurrent or sequential chemotherapy with definitive radiation therapy.     The accepted standard remains concurrent chemoradiotherapy although this is being challenged    The potential benefit of adding surgery to combined chemoradiotherapy for stage IIIA disease was noted in the Intergroup 0139 trial.  T1-3N2 NSCLC were assigned to concurrent chemorads 45 Gy with two cycles of Cisplatin/Etoposide followed by either surgery of continued chemorads.  PFS was longer in the surgery arm with no OS difference.        For those receiving definitive chemoradiation, widely used regimens include cisplatin plus etoposide, in conjunction with once daily RT to a total of 60 Gy. An alternative \"radiosensitizing chemotherapy\" approach uses weekly carboplatin plus paclitaxel with approximately 60 Gy of radiation, followed by two cycles of consolidation with this same chemotherapy combination at standard systemic doses. The combination of pemetrexed and platinum agents has emerged as another acceptable alternative for stage III patients with nonsquamous cell histology.      Based on preclinical evidence demonstrating synergy between RT and IO, the Phase III [facility] trial used a PDL1 inhibitor Durvalumab after concurrent CRT in a randomized " study involving 713 patients.  After median follow up of 34 months giving drug every 2 weeks for 12 months as consolidation, the use of durvalumab significantly improved PFR from 5.6 to 16.9 months and led to a significant improvement in OS with HR 0.72.  The incidence of new mets was also lower including brain mets.     At this point, based on the profile of any of the PD1/PDL1 checkpoint inhibitors, either Pembrolizumab every 3 weeks or Durvalumab every 2 weeks would be used. Based on the toxicity profile would consider Pembrolizumab    Neoadjuvant Strategies     Nivolumab is approved by the [place] Food and Drug Administration for use in combination with platinum-based chemotherapy for the neoadjuvant treatment of patients with resectable NSCLCs that are ?4 cm or node positive    However, this is not generally given to those whose tumors harbor an epidermal growth factor receptor (EGFR) or anaplastic lymphoma kinase (ALK) genetic mutation.    In the neoadjuvant setting, in [facility], among over 350 patients with stage IB >4 cm to III resectable NSCLC and no known EGFR/ALK genetic mutation, the addition of nivolumab to neoadjuvant platinum-doublet chemotherapy improved pathologic complete response rates (24.0 versus 2.2 percent; odds ratio 13.9, 99% CI 3.5-55.8), without decreasing the percentage who underwent definitive surgery (83 versus 75 percent) or increasing grade ?3 adverse events (34 versus 37 percent) . Median event-free survivals with and without nivolumab were 32 versus 21 months (hazard ratio [HR] 0.63, 97% CI 0.43-0.91). A prespecified interim analysis for OS resulted in an HR of 0.57 (99.7% CI 0.30-1.07); although this did not cross the threshold for statistical significance, the  majority of patients were still living at the time of this analysis (74 percent)    Similarly, major pathologic responses have been observed in neoadjuvant trials with nivolumab and ipilimumab (32 percent, 80% CI 19-43 );  nivolumab, ipilimumab, and chemotherapy (50 percent, 80% CI 35-61; as well as for atezolizumab (in 18 percent, 95% CI 11-21\\    Perioperative strategies incorporating immunotherapy have also demonstrated benefits and pembrolizumab is approved in this setting:    Perioperative pembrolizumab has improved event-free and overall survival, in a randomized, placebo-controlled trial  Among 397 patients with resectable stage II to IIIB NSCLC, the incorporation of neoadjuvant pembrolizumab with cisplatin-based chemotherapy, followed by adjuvant pembrolizumab for up to 13 cycles, improved event-free survival relative to chemotherapy alone (62 versus 41 percent at 24 months, respectively; HR 0.58, 95% CI 0.46-0.72). A pathological complete response occurred in 18 percent in the pembrolizumab group and 4 percent in the placebo group. Overall survival was also superior (median, not reached versus 52 months; HR 0.72, 95% CI 0.56-0.93)  Grade ?3 adverse effects occurred in 45 and 37 percent, respectively.    Thus, there was discussion regarding platinum based chemotherapy with Pemetrexed/Pembrolizumab in the neoadjuvant setting as well as combined modality therapy with RT as well as weekly Carboplatin/Taxol followed by possible IO therapy, surgery followed by IO therapy.      Final treatment will be determined after other disciplines evaluate the patient.         Assessment/Plan:    Ms. 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  She has been tolerating Hydrea 500 mg once daily Monday through Friday and off on Saturday and Sunday. Patient also with 4.1 cm RUL mass 1.5cm spiculated posterior nodule mediastinal adenopathy, no metastatic disease including brain MRI new diagnosis     ET:      - hydroxyurea (HYDREA) 500 mg capsule; continue daily 500 mg 7 days per week; once treatment starts will hold       Plts 501 4/5/2024; 3/12/2024 were 4949; 2/16/2024 417;  12/31/23 were 361; 10/15/2023 were 366    Thus hydrea was increased to daily 500 mg seven days per week.      NSCLC/adenocarcinoma;mutational analysis pending     Now with RUL 4.2 cm mass with mediastinal adenopathy at least cT2a cN2 cM0 lung cancer    Probable T2a N2 Stage IIIA lung     Please see above regarding discussion         IgG deficiency    IgG mildly low at 571.  This is not significantly decreased enough to cause the recurrent infections.  Will continue to monitor on subsequent labs.  No need for intervention at this time.     - Comprehensive metabolic panel; Future  - IgG, IgA, IgM; Future      Follow up    Discussion tumor board  Rad Onc  CT surgery evaluation  1-2 weeks                 History of present illness:  Initial Visit 4/10/2024    Ayla Nye is a 73-year-old female with past medical history of hypertension, psoriatic arthritis, CKD stage III, hyperlipidemia, and latent TB.  She is seen in follow-up for essential thrombocytosis.  She has had an elevated platelet count dating back to January 2028 all of her other cell lines were within normal limits.  Her highest platelet count was around 700,000.  Myeloproliferative work-up demonstrated positive JAK2 mutation and she was started on 500 mg of Hydrea daily in July 2020.  Due to leukopenia her dose was reduced to Monday, Wednesday, Friday.  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  Then again in July we had to decrease her dose and she is taking 500 mg once daily Monday through Friday and not taking any on Saturday or Sunday.    She has been tolerating the 500 mg of Hydrea Monday through Friday off Saturday and Sunday well without any side effects.  She was seen by my attending and underwent further workup of her recurrent infections and was found to have  a mildly low IgG level.   She has had pneumonia twice once in October and then again in February.  She states that she has had multiple imaging of her chest which does suggest scarring which prompted a CT scan of her chest .  .  She is also had recurrent sinus infections.  She is a former smoker and quit 15 years ago bit was 1[[d x more than 30 years.       She does not have any continued symptoms of pneumonia and no cough, shortness of breath, or fevers.      She underwent the following testing; EBUS with pulmonary as well as CT chest/PET scan    Lung mass on CT chest 2/28/2024    LUNGS:     -4.1 x 3.4 cm solid mass in the anterior right upper lobe (series 302, image 73).     -Posterior to this mass, there is a 1.5 x 1.5 cm spiculated nodule (series 302, image 78)     -2.5 x 2.1 cm groundglass nodule in the anterior left upper lobe (series 302, image 90)     Mild emphysema.     Right apical pleural-parenchymal scarring.     PLEURA: Small calcified left posterior pleural plaque, which may be from prior asbestos exposure or the sequela of old inflammation.     HEART/GREAT VESSELS: Normal heart size. Mild-moderate coronary artery calcification. Mild to moderate mitral annular calcification. Trace pericardial effusion. No thoracic aortic aneurysm.     MEDIASTINUM AND SUDEEP: 1.6 x 2.0 cm right lower paratracheal/precarinal lymph node. 1.3 x 1.3 cm right lower paratracheal lymph node.       CT PET  3/28/2024    Intensely FDG avid right upper lobe mass, SUV max of 18. This abuts the anterior pleural margin. Central photopenia suggest necrosis. Mass measures on the order of 4.2 cm in size, stable previously 4.1 cm.     Subjacent 1.5 cm spiculated nodule posteriorly demonstrates minimal uptake, SUV max of 1.7.     Minimal FDG uptake in the left upper lobe groundglass nodule, SUV max of 2.2. This measured up to 2.5 cm in size on recent CT, appears grossly stable on low-dose CT.     A few FDG-avid mediastinal lymph nodes.  Right anterior paratracheal lymph node demonstrates SUV max of 13. This measures up to 1.9 cm short axis image 85 series 3, may be slightly larger previously 1.6 cm.     A right perihilar lymph node demonstrates SUV max of 12. This measures on the order of 1.2 cm short axis image 89 series 3.  CT images: Scattered emphysematous changes of the lung fields. Mild to moderate coronary artery calcifications. Minimal left pleural calcification. Small calcified lymph nodes in the left perihilar region      3/26/2024    -C. Lymph Node, Level 4R (ThinPrep, smears and cell block preparations):    - Metastatic non-small cell carcinoma, most compatible with a primary lung adenocarcinoma; see note.    - Satisfactory for evaluation.     D-F. Lymph Node, Level 10R (ThinPrep, smears and cell block preparations):    - Metastatic non-small cell carcinoma; see note.    - Satisfactory for evaluation.     G-I. Lymph Node, Level 4L (ThinPrep, smears and cell block preparations):    - Metastatic non-small cell carcinoma; see note.    - Satisfactory for evaluation.           Review of Systems   Constitutional:  Positive for fatigue. Negative for activity change, appetite change, diaphoresis, fever and unexpected weight change.   HENT:  Negative for trouble swallowing and voice change.    Eyes:  Negative for photophobia and visual disturbance.   Respiratory:  Negative for cough, chest tightness and shortness of breath.    Cardiovascular:  Negative for chest pain, palpitations and leg swelling.   Gastrointestinal:  Negative for abdominal distention, abdominal pain, anal bleeding, blood in stool, constipation, diarrhea, nausea and vomiting.   Endocrine: Negative for cold intolerance and heat intolerance.   Genitourinary:  Negative for dysuria, hematuria and urgency.   Musculoskeletal:  Negative for arthralgias, back pain, gait problem, joint swelling and myalgias.   Skin:  Negative for pallor and rash.   Neurological:  Negative for dizziness,  weakness, light-headedness and headaches.   Hematological:  Negative for adenopathy. Does not bruise/bleed easily.   Psychiatric/Behavioral:  Negative for confusion and sleep disturbance.           ECOG PS: 0    PROBLEM LIST:  Patient Active Problem List   Diagnosis    TB lung, latent    Psoriatic arthritis (HCC)    Essential thrombocytosis (HCC)    HTN (hypertension), benign    Mixed hyperlipidemia    Encounter for monitoring of hydroxyurea therapy    JAK2 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    Age-related osteoporosis without current pathological fracture    Stage 3b chronic kidney disease (CKD) (HCC)    Primary lung adenocarcinoma, right (HCC)       Past Medical History:   has a past medical history of Allergic (2022), Essential thrombocytosis (HCC), Hyperlipidemia, Hypertension, Mass in chest, Nodular goiter, and Psoriasis.    PAST SURGICAL HISTORY:   has a past surgical history that includes Appendectomy; Mammo needle localization right (all inc) (Right, 07/13/2009); Skin lesion excision; Colonoscopy (10/31/2018); Mammo (historical); DXA procedure(historical) (04/21/2017); and Breast cyst excision (Right, 2009).    CURRENT MEDICATIONS  Current Outpatient Medications   Medication Sig Dispense Refill    amLODIPine (NORVASC) 5 mg tablet Take 1 tablet (5 mg total) by mouth 2 (two) times a day 180 tablet 1    Apremilast 30 MG TABS 30 mg Every 12 hours       aspirin 81 MG tablet Take 81 mg by mouth daily       Cholecalciferol (VITAMIN D3) 5000 units TABS 5,000 Units Daily       diphenhydrAMINE (BENADRYL) 25 mg capsule Take 25 mg by mouth every 12 (twelve) hours as needed for itching       Fluocinolone Acetonide Scalp 0.01 % OIL       halobetasol (ULTRAVATE) 0.05 % ointment        hydrocortisone 2.5 % ointment       hydroxyurea (HYDREA) 500 mg capsule Take 1 capsule (500 mg total) by mouth daily 90 capsule 3    losartan (COZAAR) 50 mg tablet Take 1 tablet (50 mg total) by mouth daily 90 tablet 1    Probiotic Product (PROBIOTIC DAILY PO)  Take 1 capsule by mouth daily      rosuvastatin (CRESTOR) 5 mg tablet TAKE 1 TABLET BY MOUTH DAILY AT BEDTIME 90 tablet 1    vitamin B-12 (VITAMIN B-12) 1,000 mcg tablet Take 1 tablet (1,000 mcg total) by mouth daily 90 tablet 1     No current facility-administered medications for this visit.     [unfilled]    SOCIAL HISTORY:   reports that she has quit smoking. Her smoking use included cigarettes. She started smoking about 58 years ago. She has a 58.3 pack-year smoking history. She has never used smokeless tobacco. She reports that she does not currently use alcohol. She reports that she does not use drugs.     FAMILY HISTORY:  family history includes Cancer in her brother and brother; Coronary artery disease in her brother; Depression in her mother; Hearing loss in her mother; Hypertension in her brother and mother; No Known Problems in her daughter, daughter, maternal aunt, maternal aunt, maternal aunt, maternal aunt, maternal grandfather, maternal grandmother, paternal aunt, paternal grandfather, and paternal grandmother; Stroke in her mother; Tuberculosis in her brother and father.     ALLERGIES:  is allergic to doxycycline, keflex [cephalexin], and neomycin-polymyxin-dexameth.      Physical Exam:  Vital Signs:   Visit Vitals  OB Status Postmenopausal   Smoking Status Former     There is no height or weight on file to calculate BMI.  There is no height or weight on file to calculate BSA.    GEN: Alert, awake oriented x3, in no acute distress  HEENT- No pallor, icterus, cyanosis, no oral mucosal lesions,   LAD - no palpable cervical, clavicle, axillary, inguinal LAD  Heart- normal S1 S2, regular rate and rhythm, No murmur, rubs.   Lungs- clear breathing sound bilateral.   Abdomen- soft, Non tender, bowel sounds present  Extremities- No cyanosis, clubbing, edema  Neuro- No focal neurological deficit    Labs:  Lab Results   Component Value Date    WBC 6.69 04/05/2024    HGB 11.2 (L) 04/05/2024    HCT 34.5 (L)  04/05/2024     (H) 04/05/2024     (H) 04/05/2024     Lab Results   Component Value Date    SODIUM 139 03/12/2024    K 4.1 03/12/2024     03/12/2024    CO2 27 03/12/2024    AGAP 8 03/12/2024    BUN 14 03/12/2024    CREATININE 0.98 03/12/2024    GLUC 101 03/12/2024    GLUF 86 07/31/2023    CALCIUM 9.6 03/12/2024    AST 11 (L) 03/12/2024    ALT 6 (L) 03/12/2024    ALKPHOS 47 03/12/2024    TP 7.2 03/12/2024    TBILI 0.40 03/12/2024    EGFR 57 03/12/2024       CT PET 3/22/2024    NDICATION: Abnormal CT. Right upper lobe mass. D38.1: Neoplasm of uncertain behavior of trachea, bronchus and lung  Z87.891: Personal history of nicotine dependence  R91.8: Other nonspecific abnormal finding of lung field  R59.0: Localized enlarged lymph nodes     MODIFIER: PI     COMPARISON: CT chest 2/28/2024     CELL TYPE: N/A     TECHNIQUE:   8.2 mCi F-18-FDG administered IV. Multiplanar attenuation corrected and non attenuation corrected PET images are available for interpretation, and contiguous, low dose, axial CT sections were obtained from the skull base through the femurs.   Intravenous contrast material was not utilized. This examination, like all CT scans performed in the [facility], was performed utilizing techniques to minimize radiation dose exposure, including the use of iterative reconstruction and   automated exposure control.     Fasting serum glucose: 94 mg/dl     FINDINGS:     VISUALIZED BRAIN:  No acute abnormalities are seen.     HEAD/NECK:  There is a physiologic distribution of FDG. No FDG avid cervical adenopathy is seen.  CT images: Unremarkable.     CHEST:  Intensely FDG avid right upper lobe mass, SUV max of 18. This abuts the anterior pleural margin. Central photopenia suggest necrosis. Mass measures on the order of 4.2 cm in size, stable previously 4.1 cm.     Subjacent 1.5 cm spiculated nodule posteriorly demonstrates minimal uptake, SUV max of 1.7.     Minimal FDG uptake in  the left upper lobe groundglass nodule, SUV max of 2.2. This measured up to 2.5 cm in size on recent CT, appears grossly stable on low-dose CT.     A few FDG-avid mediastinal lymph nodes. Right anterior paratracheal lymph node demonstrates SUV max of 13. This measures up to 1.9 cm short axis image 85 series 3, may be slightly larger previously 1.6 cm.     A right perihilar lymph node demonstrates SUV max of 12. This measures on the order of 1.2 cm short axis image 89 series 3.  CT images: Scattered emphysematous changes of the lung fields. Mild to moderate coronary artery calcifications. Minimal left pleural calcification. Small calcified lymph nodes in the left perihilar region.     ABDOMEN:  No FDG avid soft tissue lesions are seen.  CT images: 2 mm calculus suggested in the right kidney upper pole.     PELVIS:  No FDG avid soft tissue lesions are seen.  CT images: Unremarkable.     OSSEOUS STRUCTURES:  No FDG avid lesions are seen.  CT images: No significant findings.     IMPRESSION:     1. Intensely FDG avid right upper lobe mass compatible with malignancy.     2. FDG avid lymphadenopathy in the mediastinum and right perihilar region suspicious for metastasis.     3. Minimal FDG uptake in the 2.5 cm left upper lobe groundglass nodule, nonspecific, but low-grade adenocarcinoma is not excluded.     4. No findings for hypermetabolic metastasis in the neck, abdomen and pelvis.        Narrative & Impression   CT CHEST WITHOUT IV CONTRAST  2/28/2024     INDICATION: J18.9: Pneumonia, unspecified organism. Patient reportedly had pneumonia 2 weeks ago.     COMPARISON: Radiograph of the chest from July 16, 2019.     TECHNIQUE: CT examination of the chest was performed without intravenous contrast. Multiplanar 2D reformatted images were created from the source data.     This examination, like all CT scans performed in the [facility], was performed utilizing techniques to minimize radiation dose exposure,  including the use of iterative reconstruction and automated exposure control. Radiation dose length   product (DLP) for this visit: 190.62 mGy-cm     FINDINGS:     LUNGS:     -4.1 x 3.4 cm solid mass in the anterior right upper lobe (series 302, image 73).     -Posterior to this mass, there is a 1.5 x 1.5 cm spiculated nodule (series 302, image 78)     -2.5 x 2.1 cm groundglass nodule in the anterior left upper lobe (series 302, image 90)     Mild emphysema.     Right apical pleural-parenchymal scarring.     PLEURA: Small calcified left posterior pleural plaque, which may be from prior asbestos exposure or the sequela of old inflammation.     HEART/GREAT VESSELS: Normal heart size. Mild-moderate coronary artery calcification. Mild to moderate mitral annular calcification. Trace pericardial effusion. No thoracic aortic aneurysm.     MEDIASTINUM AND SUDEEP: 1.6 x 2.0 cm right lower paratracheal/precarinal lymph node. 1.3 x 1.3 cm right lower paratracheal lymph node.     CHEST WALL AND LOWER NECK: Unremarkable.     VISUALIZED STRUCTURES IN THE UPPER ABDOMEN: Unremarkable.     OSSEOUS STRUCTURES: No acute fracture or destructive osseous lesion. Multilevel degenerative changes of the imaged spine. Preserved vertebral body heights.     IMPRESSION:     1. 4.1 x 3.4 cm right upper lobe mass. Differential considerations include bronchogenic malignancy or pneumonia. If the patient does not have signs of pulmonary infection, FDG PET/CT and tissue sampling is recommended for further evaluation. If the   patient does have clinical signs of pulmonary infection, recommend treatment and follow-up CT in approximately 2 months for reassessment.     2. 1.5 cm spiculated nodule adjacent to this mass, which may represent satellite nodule or infectious/inflammatory process.     3. 2.5 x 2.1 cm left upper lobe groundglass nodule. This be infectious/inflammatory or represent a separate primary lung adenocarcinoma spectrum lesion.     4. 2  enlarged right lower paratracheal lymph nodes, possibly malignant yaw involvement. This can either be assessed on follow-up FDG PET/CT.     The study was marked in EPIC for immediate notification.        Narrative & Impression   MRI BRAIN WITH AND WITHOUT CONTRAST  3/28/2024     INDICATION: C34.91: Malignant neoplasm of unspecified part of right bronchus or lung.     COMPARISON: MRI brain 2/10/2015     TECHNIQUE:  Multiplanar, multisequence imaging of the brain was performed before and after gadolinium administration.        IV Contrast:  5 mL of Gadobutrol injection (SINGLE-DOSE)     IMAGE QUALITY:   Diagnostic.     FINDINGS:     BRAIN PARENCHYMA:  There is no discrete mass, mass effect or midline shift. There is no intracranial hemorrhage.  Normal posterior fossa.  Diffusion imaging is unremarkable.     Small scattered hyperintensities on T2/FLAIR imaging are noted in the periventricular and subcortical white matter demonstrating an appearance that is statistically most likely to represent moderate microangiopathic change, progressed since 2015.     Postcontrast imaging of the brain demonstrates no pathologic intra-axial enhancement.     VENTRICLES:  Ventricles and extra-axial CSF spaces are prominent commensurate with the degree of volume loss.  No hydrocephalus.  No intraventricular hemorrhage.     SELLA AND PITUITARY GLAND:  Normal.     ORBITS:  Normal.     PARANASAL SINUSES:  Normal.     VASCULATURE:  Evaluation of the major intracranial vasculature demonstrates appropriate flow voids.     CALVARIUM AND SKULL BASE: Marrow signal heterogeneity which can be seen with underlying red marrow proliferation.     EXTRACRANIAL SOFT TISSUES:  Normal.     IMPRESSION:     No acute infarction, edema, or mass effect.     There is no pathologic area of intra-axial enhancement to suggest metastatic disease.     Moderate chronic microangiopathic ischemic changes, progressed since 2015.              al Diagnosis  3/26/2024    A-C. Lymph Node, Level 4R (ThinPrep, smears and cell block preparations):    - Metastatic non-small cell carcinoma, most compatible with a primary lung adenocarcinoma; see note.    - Satisfactory for evaluation.     D-F. Lymph Node, Level 10R (ThinPrep, smears and cell block preparations):    - Metastatic non-small cell carcinoma; see note.    - Satisfactory for evaluation.     G-I. Lymph Node, Level 4L (ThinPrep, smears and cell block preparations):    - Metastatic non-small cell carcinoma; see note.    - Satisfactory for evaluation.   Electronically signed by Jaiden Shell MD on 3/29/2024 at  9:38 AM  Preliminary result electronically signed by Jaiden Shell MD on 3/28/2024 at  3:41 PM   Note    Part C:  Tumor cells are positive for MOC-31, MERCEDES-EP4, TTF-1, Napsin A, and are negative for synaptophysin, chromogranin A, p40, GATA3, PAX8 and CDX2. The morphology and immunophenotype is consistent with non-small cell carcinoma, most compatible with a lung primary. The specimen is being sent to [facility] for MI Profile Testing. Upon completion of testing, [employer] report will be sent directly to the requesting provider, as well as posted in the Media Tab of EPIC for this patient by the Pathology Department.      Reviewed with agreement in intradepartmental consensus.      Part F:  Tumor cells are positive for CK7 and negative for CK20, supporting the diagnosis.     Part I:  MOC-31 and MERCEDES-EP4 highlights rare metastatic cells.     FLOW CYTOMETRY STUDIES: [employer] #[id number]; see separate report           IF ADDITIONAL STUDIES ARE REQUESTED, BLOCKS C, F AND I ARE RECOMMENDED.    Intraoperative Consultation    Lymph Node, Level 4R FNA On-Site Evaluation:     - Atypical cellular clusters, compatible with non-small cell carcinoma. Send additional for cell block.      Results communicated to Dr. GENEVIEVE Tellez by Dr. GENEVIEVE Shell via phone on 03/26/2024. Interpretation performed at [facility],  [address] [address].     Lymph Node, Level 10R FNA On-Site Evaluation:     - Rare atypical cellular clusters, suspicious for malignancy. Additional material for cell block requested.      Results communicated to Dr. GENEVIEVE Tellez by Dr. GENEVIEVE Shell via phone on 03/26/2024. Interpretation performed at [facility], [address] [address].     Lymph Node, Level 4L FNA On-Site Evaluation:     - Predominately blood. Lymphocytes and rare epithelioid cells. Send additional for cell block.     Results communicated to Dr. GENEVIEVE Tellez by Dr. GENEVIEVE Shell via phone on 03/26/2024. Interpretation performed at [facility], 1         I spent 65 minutes on chart review, face to face counseling time, coordination of care and documentation.    Rosalinda Castro MD PhD

## 2024-04-10 ENCOUNTER — OFFICE VISIT (OUTPATIENT)
Age: 74
End: 2024-04-10
Payer: MEDICARE

## 2024-04-10 ENCOUNTER — TELEPHONE (OUTPATIENT)
Dept: HEMATOLOGY ONCOLOGY | Facility: CLINIC | Age: 74
End: 2024-04-10

## 2024-04-10 VITALS
SYSTOLIC BLOOD PRESSURE: 122 MMHG | BODY MASS INDEX: 21.16 KG/M2 | OXYGEN SATURATION: 98 % | DIASTOLIC BLOOD PRESSURE: 74 MMHG | WEIGHT: 115 LBS | TEMPERATURE: 97.2 F | RESPIRATION RATE: 17 BRPM | HEART RATE: 85 BPM | HEIGHT: 62 IN

## 2024-04-10 DIAGNOSIS — Z79.64 ENCOUNTER FOR MONITORING OF HYDROXYUREA THERAPY: ICD-10-CM

## 2024-04-10 DIAGNOSIS — C34.91 NSCLC OF RIGHT LUNG (HCC): Primary | ICD-10-CM

## 2024-04-10 DIAGNOSIS — Z51.81 ENCOUNTER FOR MONITORING OF HYDROXYUREA THERAPY: ICD-10-CM

## 2024-04-10 DIAGNOSIS — Z15.89 JAK2 V617F MUTATION: ICD-10-CM

## 2024-04-10 DIAGNOSIS — D47.3 ESSENTIAL THROMBOCYTOSIS (HCC): ICD-10-CM

## 2024-04-10 PROCEDURE — 99205 OFFICE O/P NEW HI 60 MIN: CPT | Performed by: INTERNAL MEDICINE

## 2024-04-10 NOTE — TELEPHONE ENCOUNTER
I called Ayla in response to a referral that was received for patient to establish care with Thoracic Surgery.     Outreach was made to complete patient's intake questionnaire .    Patient's intake questionnaire was reviewed and complete. Patient's intake has been sent to the team for clinical review.

## 2024-04-11 ENCOUNTER — DOCUMENTATION (OUTPATIENT)
Dept: HEMATOLOGY ONCOLOGY | Facility: CLINIC | Age: 74
End: 2024-04-11

## 2024-04-11 NOTE — PROGRESS NOTES
In-basket message received from Mak Rollins RN to add patient to the thoracic MDCC on 4/15/2024. Chart reviewed and prep completed.

## 2024-04-11 NOTE — PROGRESS NOTES
Intake received/ Chart reviewed for work up completed     Imaging completed: 3/28/24 MRI brain; 3/22/24 PET/CT; 2/28/24 CT chest    Pathology completed:  3/26/24 at [facility]    All records needed are in patients chart. No records retrieval needed at this time.    Routing to [facility] for scheduling of:  -Thoracic Surgery consult within 5-7 days.     Nurse Navigator outreach not needed at this time. OCC to call patient with appointment details.

## 2024-04-12 ENCOUNTER — DOCUMENTATION (OUTPATIENT)
Dept: HEMATOLOGY ONCOLOGY | Facility: CLINIC | Age: 74
End: 2024-04-12

## 2024-04-12 LAB
CARIS ALK: NORMAL
CARIS GENOMIC LOH - EXOME: NORMAL
CARIS MSI - EXOME: NORMAL
CARIS PD-L1 (22C3): POSITIVE
CARIS PD-L1 FDA (28-8): POSITIVE
CARIS PD-L1 FDA(SP142): NEGATIVE
CARIS PTEN: POSITIVE
CARIS TMB - EXOME: NORMAL

## 2024-04-12 NOTE — PROGRESS NOTES
Received Paperwork   What type of form FMLA Blank   Scanned blank form into patient's Epic chart Yes   Method received form  Mail   Provider responsible for form Dr. Castro   Informed patient our office turn around time for completing patient forms is 5-7 business days. Yes, informed patient of turn around time     Comments Please Fax to [phone] envelope sent to mail back as well

## 2024-04-14 ENCOUNTER — DOCUMENTATION (OUTPATIENT)
Dept: HEMATOLOGY ONCOLOGY | Facility: CLINIC | Age: 74
End: 2024-04-14

## 2024-04-14 NOTE — PROGRESS NOTES
Thoracic Consult  Assessment/Plan:  73yF with [id number] Stage IIIB adenocarcinoma. Her pathology from EBUS shows contralateral 4L yaw disease making her N3 and upstaging her to Stage IIIB. This is assuming the other RUL spiculated lesion is benign. Given this staging and contralateral yaw disease she would best be served by a definitive chemotherapy-radiation plan followed by immunotherapy. I told the patient I would update her after Tumor Board today.     I have spent a total time of 60 minutes on 04/15/24 in caring for this patient including Diagnostic results, Counseling / Coordination of care, Documenting in the medical record, Reviewing / ordering tests, medicine, procedures  , and Obtaining or reviewing history  .       Rogelio Beebe,   Thoracic Surgery     Diagnoses and all orders for this visit:    Adenocarcinoma of upper lobe of right lung (HCC)  -     Ambulatory Referral to Thoracic Surgery          Thoracic History   Diagnosis: RUL nodule   Procedures/Surgeries: EBUS   Pathology: RUL adenocarcinoma, 4R, 4L and 10R adenocarcinoma   Adjuvant Therapy:       Subjective:    Patient ID: Ayla Nye is a 73 y.o. female who was initially found to have a RUL nodule and RUL mass with mediastinal adenopathy. Workup would include a PET showing avidity of the mass and mediastinal lymph nodes. An MRI brain was done without evidence of mets. An EBUS was done with sampling of the 4R, 4L and 10R nodes. These would all come back positive for adenocarcinoma. She has been seen by Dr. Castro of Medical Oncology. She has been referred for consideration of surgery as part of her treatment.     Her medical history includes essential thrombocytosis on hydroxyurea and leg pain. She is a former smoker of 56 pack years having quit in 2010.     She states her weight is stable. Has a small cough believed to be 2/2 post-nasal drip.     Data:  The following results contain my personal interpretation and summarization.     CTC (24): Two RUL lesions. A 4.2cm mass and a spiculated 1.5cm nodule. CHUYITA has 2.5cm GGO. Enlarged mediastinal lymphadenopathy.   PET (3/22/24): 4.2cm RUL mass with SUV 18. Additional RUL nodule 1.5cm without significant avidity although is spiculated. CHUYITA GGO SUV 2.2.   MRI Brain: No evidence of mets.   Path (EBUS 3/26): 4R, 10R and 4L are positive for NSCLC.           The following portions of the patient's history were reviewed and updated as appropriate: allergies, current medications, past family history, past medical history, past social history, past surgical history, and problem list.    Past Medical History:   Diagnosis Date   • Allergic     Allergic to neomiacin and cephalexin   • Essential thrombocytosis (HCC)     controlled with medication   • Hyperlipidemia    • Hypertension    • Mass in chest    • Nodular goiter    • Psoriasis       Past Surgical History:   Procedure Laterality Date   • APPENDECTOMY     • BREAST CYST EXCISION Right    • COLONOSCOPY  10/31/2018   • DXA PROCEDURE (HISTORICAL)  2017   • MAMMO (HISTORICAL)      18   • MAMMO NEEDLE LOCALIZATION RIGHT (ALL INC) Right 2009   • SKIN LESION EXCISION      bridge of nose      Family History   Problem Relation Age of Onset   • Stroke Mother    • Depression Mother            • Hypertension Mother    • Hearing loss Mother    • Tuberculosis Father    • Cancer Brother         lung   • Tuberculosis Brother    • Coronary artery disease Brother    • Hypertension Brother    • No Known Problems Daughter    • No Known Problems Daughter    • No Known Problems Maternal Grandmother    • No Known Problems Maternal Grandfather    • No Known Problems Paternal Grandmother    • No Known Problems Paternal Grandfather    • No Known Problems Maternal Aunt    • No Known Problems Maternal Aunt    • No Known Problems Maternal Aunt    • No Known Problems Maternal Aunt    • No Known Problems Paternal Aunt    • Cancer Brother          Throat canver      Social History     Socioeconomic History   • Marital status:      Spouse name: Not on file   • Number of children: Not on file   • Years of education: Not on file   • Highest education level: Not on file   Occupational History   • Not on file   Tobacco Use   • Smoking status: Former     Current packs/day: 1.00     Average packs/day: 1 pack/day for 58.3 years (58.3 ttl pk-yrs)     Types: Cigarettes     Start date: 1966   • Smokeless tobacco: Never   • Tobacco comments:     Quit 2010   Vaping Use   • Vaping status: Never Used   Substance and Sexual Activity   • Alcohol use: Not Currently   • Drug use: Never   • Sexual activity: Not Currently     Birth control/protection: Post-menopausal   Other Topics Concern   • Not on file   Social History Narrative   • Not on file     Social Determinants of Health     Financial Resource Strain: Low Risk  (8/14/2023)    Overall Financial Resource Strain (CARDIA)    • Difficulty of Paying Living Expenses: Not hard at all   Food Insecurity: Not on file   Transportation Needs: No Transportation Needs (8/14/2023)    PRAPARE - Transportation    • Lack of Transportation (Medical): No    • Lack of Transportation (Non-Medical): No   Physical Activity: Not on file   Stress: Not on file   Social Connections: Not on file   Intimate Partner Violence: Not on file   Housing Stability: Not on file      Review of Systems   Constitutional:  Negative for appetite change, chills, fatigue and unexpected weight change.   HENT:  Negative for trouble swallowing and voice change.    Eyes:  Negative for photophobia and visual disturbance.   Respiratory:  Positive for cough. Negative for chest tightness and shortness of breath.    Cardiovascular:  Negative for chest pain and palpitations.   Gastrointestinal:  Negative for abdominal pain, nausea and vomiting.   Musculoskeletal:  Positive for arthralgias and gait problem. Negative for back pain.   Skin:  Negative for rash and wound.    Neurological:  Negative for dizziness, weakness and light-headedness.   Psychiatric/Behavioral:  Negative for agitation and behavioral problems.    All other systems reviewed and are negative.        Objective:   Physical Exam  Vitals and nursing note reviewed.   Constitutional:       Appearance: Normal appearance.   HENT:      Head: Normocephalic and atraumatic.   Eyes:      Extraocular Movements: Extraocular movements intact.      Pupils: Pupils are equal, round, and reactive to light.   Cardiovascular:      Rate and Rhythm: Normal rate and regular rhythm.   Pulmonary:      Effort: Pulmonary effort is normal. No respiratory distress.      Breath sounds: Normal breath sounds.   Abdominal:      General: Abdomen is flat. There is no distension.      Palpations: Abdomen is soft.   Musculoskeletal:         General: Normal range of motion.      Cervical back: Normal range of motion and neck supple.      Right lower leg: No edema.      Left lower leg: No edema.   Skin:     General: Skin is warm and dry.   Neurological:      General: No focal deficit present.      Mental Status: She is alert and oriented to person, place, and time.   Psychiatric:         Mood and Affect: Mood normal.         Behavior: Behavior normal.         Thought Content: Thought content normal.         Judgment: Judgment normal.     /70 (BP Location: Left arm, Patient Position: Sitting, Cuff Size: Standard)   Pulse 90   Temp (!) 97.4 °F (36.3 °C) (Temporal)   Wt 51.9 kg (114 lb 6.7 oz)   SpO2 98%   BMI 20.93 kg/m²     No Chest XR results available for this patient.   CT chest wo contrast    Result Date: 3/5/2024  Narrative CT CHEST WITHOUT IV CONTRAST INDICATION: J18.9: Pneumonia, unspecified organism. Patient reportedly had pneumonia 2 weeks ago. COMPARISON: Radiograph of the chest from July 16, 2019. TECHNIQUE: CT examination of the chest was performed without intravenous contrast. Multiplanar 2D reformatted images were created from  the source data. This examination, like all CT scans performed in the [facility], was performed utilizing techniques to minimize radiation dose exposure, including the use of iterative reconstruction and automated exposure control. Radiation dose length product (DLP) for this visit: 190.62 mGy-cm FINDINGS: LUNGS: -4.1 x 3.4 cm solid mass in the anterior right upper lobe (series 302, image 73). -Posterior to this mass, there is a 1.5 x 1.5 cm spiculated nodule (series 302, image 78) -2.5 x 2.1 cm groundglass nodule in the anterior left upper lobe (series 302, image 90) Mild emphysema. Right apical pleural-parenchymal scarring. PLEURA: Small calcified left posterior pleural plaque, which may be from prior asbestos exposure or the sequela of old inflammation. HEART/GREAT VESSELS: Normal heart size. Mild-moderate coronary artery calcification. Mild to moderate mitral annular calcification. Trace pericardial effusion. No thoracic aortic aneurysm. MEDIASTINUM AND SUDEEP: 1.6 x 2.0 cm right lower paratracheal/precarinal lymph node. 1.3 x 1.3 cm right lower paratracheal lymph node. CHEST WALL AND LOWER NECK: Unremarkable. VISUALIZED STRUCTURES IN THE UPPER ABDOMEN: Unremarkable. OSSEOUS STRUCTURES: No acute fracture or destructive osseous lesion. Multilevel degenerative changes of the imaged spine. Preserved vertebral body heights.     Impression 1. 4.1 x 3.4 cm right upper lobe mass. Differential considerations include bronchogenic malignancy or pneumonia. If the patient does not have signs of pulmonary infection, FDG PET/CT and tissue sampling is recommended for further evaluation. If the patient does have clinical signs of pulmonary infection, recommend treatment and follow-up CT in approximately 2 months for reassessment. 2. 1.5 cm spiculated nodule adjacent to this mass, which may represent satellite nodule or infectious/inflammatory process. 3. 2.5 x 2.1 cm left upper lobe groundglass nodule. This be  infectious/inflammatory or represent a separate primary lung adenocarcinoma spectrum lesion. 4. 2 enlarged right lower paratracheal lymph nodes, possibly malignant yaw involvement. This can either be assessed on follow-up FDG PET/CT. The study was marked in EPIC for immediate notification. Workstation performed: [id number]     No CT Chest,Abdomen,Pelvis results available for this patient.   NM PET CT skull base to mid thigh    Result Date: 3/22/2024  Narrative PET/CT SCAN INDICATION: Abnormal CT. Right upper lobe mass. D38.1: Neoplasm of uncertain behavior of trachea, bronchus and lung Z87.891: Personal history of nicotine dependence R91.8: Other nonspecific abnormal finding of lung field R59.0: Localized enlarged lymph nodes MODIFIER: PI COMPARISON: CT chest 2/28/2024 CELL TYPE: N/A TECHNIQUE:   8.2 mCi F-18-FDG administered IV. Multiplanar attenuation corrected and non attenuation corrected PET images are available for interpretation, and contiguous, low dose, axial CT sections were obtained from the skull base through the femurs. Intravenous contrast material was not utilized. This examination, like all CT scans performed in the [facility], was performed utilizing techniques to minimize radiation dose exposure, including the use of iterative reconstruction and automated exposure control. Fasting serum glucose: 94 mg/dl FINDINGS: VISUALIZED BRAIN: No acute abnormalities are seen. HEAD/NECK: There is a physiologic distribution of FDG. No FDG avid cervical adenopathy is seen. CT images: Unremarkable. CHEST: Intensely FDG avid right upper lobe mass, SUV max of 18. This abuts the anterior pleural margin. Central photopenia suggest necrosis. Mass measures on the order of 4.2 cm in size, stable previously 4.1 cm. Subjacent 1.5 cm spiculated nodule posteriorly demonstrates minimal uptake, SUV max of 1.7. Minimal FDG uptake in the left upper lobe groundglass nodule, SUV max of 2.2. This measured up to  2.5 cm in size on recent CT, appears grossly stable on low-dose CT. A few FDG-avid mediastinal lymph nodes. Right anterior paratracheal lymph node demonstrates SUV max of 13. This measures up to 1.9 cm short axis image 85 series 3, may be slightly larger previously 1.6 cm. A right perihilar lymph node demonstrates SUV max of 12. This measures on the order of 1.2 cm short axis image 89 series 3. CT images: Scattered emphysematous changes of the lung fields. Mild to moderate coronary artery calcifications. Minimal left pleural calcification. Small calcified lymph nodes in the left perihilar region. ABDOMEN: No FDG avid soft tissue lesions are seen. CT images: 2 mm calculus suggested in the right kidney upper pole. PELVIS: No FDG avid soft tissue lesions are seen. CT images: Unremarkable. OSSEOUS STRUCTURES: No FDG avid lesions are seen. CT images: No significant findings.     Impression 1. Intensely FDG avid right upper lobe mass compatible with malignancy. 2. FDG avid lymphadenopathy in the mediastinum and right perihilar region suspicious for metastasis. 3. Minimal FDG uptake in the 2.5 cm left upper lobe groundglass nodule, nonspecific, but low-grade adenocarcinoma is not excluded. 4. No findings for hypermetabolic metastasis in the neck, abdomen and pelvis. The study was marked in EPIC for significant notification. Workstation performed: [id number]      No Barium Swallow results available for this patient.

## 2024-04-14 NOTE — PROGRESS NOTES
Chart reviewed in preparation of Thoracic Tumor Conference presentation by Dr. Castro on 4/15/24.  Patient has had several episodes of pneumonia and x-ray showed some kind of scar tissue.  CT chest demonstrated a RUL mass with a spiculated nodule adjacent to the mass and a CHUYITA groundglass nodule.  PET/CT with intensely FDG avid RUL mass compatible with malignancy and FDG avid lymphadenopathy in the mediastinum and right perihilar region suspicious for metastasis.  She underwent EBUS on 3/26/24 with pathology positive for metastatic  NSCLC.

## 2024-04-15 ENCOUNTER — OFFICE VISIT (OUTPATIENT)
Dept: CARDIAC SURGERY | Facility: CLINIC | Age: 74
End: 2024-04-15
Payer: MEDICARE

## 2024-04-15 VITALS
SYSTOLIC BLOOD PRESSURE: 120 MMHG | OXYGEN SATURATION: 98 % | DIASTOLIC BLOOD PRESSURE: 70 MMHG | HEART RATE: 90 BPM | BODY MASS INDEX: 20.93 KG/M2 | WEIGHT: 114.42 LBS | TEMPERATURE: 97.4 F

## 2024-04-15 DIAGNOSIS — C34.11 ADENOCARCINOMA OF UPPER LOBE OF RIGHT LUNG (HCC): ICD-10-CM

## 2024-04-15 PROCEDURE — 99205 OFFICE O/P NEW HI 60 MIN: CPT | Performed by: SURGERY

## 2024-04-16 ENCOUNTER — PATIENT OUTREACH (OUTPATIENT)
Dept: HEMATOLOGY ONCOLOGY | Facility: CLINIC | Age: 74
End: 2024-04-16

## 2024-04-16 NOTE — PROGRESS NOTES
Patient has new patient consult with radiation on 4/23. Patient scheduled for f/u with Dr. Castro to consent for Carbo/Taxel on 4/29 @ 3:30 pm. Patient agreeable to appt time.

## 2024-04-16 NOTE — PROGRESS NOTES
I reached out and spoke with Ayla now that consults have been completed with the oncology teams to review for any barriers to care and offer supportive services as needed. I reviewed and updated the members assigned to the care team in [facility].   She knows the members of the care team as well as how and when to contact them with any needs.  Ayla stated she would like a nurse from Dr. Castro's office to reach out to her to clarify her tx plan and when she should have a FU with Dr. Castro. Ayla is aware she has a radiation consult with Dr. Gamboa on 4/23 to go over the radiation portion.   She verbalizes managing the schedules well.   They are currently driving themselves and deny any transportation needs.    She denies any uncontrolled symptoms. Discussed role of Palliative Care in symptom and side effect management. Declined referral at this time. Ayla will let me know if she would like to be established with palliative care, I will also reach out to her periodically.    Patients states that they are eating and drinking as per usual with no unintentional weight loss.   Ayla states normally does not eat much or drink as much as she would. At this time she did not want a referral to see a dietician but will let me know if she changes her mind.   Patient does not smoke.   Patient states she is well supported by family and friends.  Community support information provided via mail, including pamphlets.  Patient feels they have adequate insurance coverage and deny any financial concerns at this time.     Discussed the plan for follow-up with the patient. Based on their individual needs I will follow up with them in about 4 weeks.   I have provided my direct contact information to the patient and welcome them to contact me if their needs as discussed above change. They were appreciative for the call.

## 2024-04-17 ENCOUNTER — DOCUMENTATION (OUTPATIENT)
Dept: HEMATOLOGY ONCOLOGY | Facility: CLINIC | Age: 74
End: 2024-04-17

## 2024-04-17 NOTE — PROGRESS NOTES
Received Paperwork   What type of form FMLA   Scanned blank form into patient's Epic chart Yes   Method received form  [employer]   Provider responsible for form Dr Castro   Informed patient our office turn around time for completing patient forms is 5-7 business days. Yes, informed patient of turn around time     Comments

## 2024-04-18 ENCOUNTER — PATIENT OUTREACH (OUTPATIENT)
Dept: CASE MANAGEMENT | Facility: HOSPITAL | Age: 74
End: 2024-04-18

## 2024-04-18 NOTE — PROGRESS NOTES
Biopsychosocial and Barriers Assessment    Cancer Diagnosis: Lung Cancer  Home/Cell Phone: [phone]  Emergency Contact: Sue Antony (dtr)  Marital Status:   Interpretation concerns, speaks another language, preferred language: English  Cultural concerns: none identified  Ability to read or write: yes    Caregiver/Support: ex-, daughters and best friend  Children: 2 adult daughters  Child/Elder care: none    Housing: House  Home Setup: multistory  Lives With: alone  Daily Living Activities: independent  Durable Medical Equipment: none  Ambulation: independent    Preferred Pharmacy:[facility]  High co-pays with insurance: Medicare primary, AARP secondary  High co-pays with medication coverage: none at this time  No medication coverage: N/A    Primary Care Provider: Rafy Angelo MD  Hx of Home Health Care: none  Hx of Short term rehab: none  Mental Health Hx: none  Substance Abuse Hx: former tobacco  Employment: retired for age  [place] Status/Location: not addressed today  Ability to pay bills: yes  POA/LW/AD: not addressed today  Transportation Plan/Concerns: family and friend can assist      What do you know about your Cancer Diagnosis    What has your doctor told you about your cancer diagnosis: not asked today    What has your doctor told you about your cancer treatment: not asked today    What specific concerns do you have about your diagnosis and treatment: none at this time    Have you been made aware of any hair loss associated with treatment: N/A    Additional Comments:    Initial outreach call placed to Mrs. Nye today. She is a very pleasant 72 y/o woman with newly diagnosed lung cancer. She will meet with radiation and medical oncology in the next few weeks to discuss a treatment plan. She shared she lives alone, however her ex-, 2 adult daughters, and her best friend are very supportive. She denies any financial stress or psychosocial barriers. OSW explained  role of OSW team and offered support. She took this writer's contact information and stated she will reach out in the future as needed. OSW will close referral.

## 2024-04-23 ENCOUNTER — RADIATION ONCOLOGY CONSULT (OUTPATIENT)
Dept: RADIATION ONCOLOGY | Facility: HOSPITAL | Age: 74
End: 2024-04-23
Attending: INTERNAL MEDICINE
Payer: MEDICARE

## 2024-04-23 VITALS
HEIGHT: 62 IN | OXYGEN SATURATION: 98 % | HEART RATE: 68 BPM | RESPIRATION RATE: 14 BRPM | SYSTOLIC BLOOD PRESSURE: 115 MMHG | TEMPERATURE: 97.9 F | DIASTOLIC BLOOD PRESSURE: 60 MMHG | BODY MASS INDEX: 20.91 KG/M2 | WEIGHT: 113.6 LBS

## 2024-04-23 DIAGNOSIS — C34.11 MALIGNANT NEOPLASM OF UPPER LOBE, RIGHT BRONCHUS OR LUNG (HCC): Primary | ICD-10-CM

## 2024-04-23 DIAGNOSIS — C34.91 NSCLC OF RIGHT LUNG (HCC): ICD-10-CM

## 2024-04-23 PROCEDURE — 99211 OFF/OP EST MAY X REQ PHY/QHP: CPT | Performed by: INTERNAL MEDICINE

## 2024-04-23 PROCEDURE — 99205 OFFICE O/P NEW HI 60 MIN: CPT | Performed by: INTERNAL MEDICINE

## 2024-04-23 NOTE — PROGRESS NOTES
Consultation - Radiation Oncology      Patient Name: Ayla Nye MRN:[id number] : 1950  Encounter: [id number]  Referring Provider: Rosalinda Castro MD     Cancer Staging   Malignant neoplasm of upper lobe, right bronchus or lung (HCC)  Staging form: Lung, AJCC 8th Edition  - Clinical stage from 4/15/2024: Stage IIIB (cT2b, cN3, cM0) - Signed by Rogelio Beebe DO on 4/15/2024    ASSESSMENT & PLAN  Ayla Nye is a 73 y.o. female with [id number] (IIIB) NSCLC of the right upper lobe s/p EBUS and biopsy on 3/26/24 confirming adenocarcinoma in levels 4R, 4L, and 10R.  PET/CT on 3/22/2024 showed a 4.2 cm mass in the right upper lobe with adjacent satellite nodule, as well as hilar and mediastinal lymphadenopathy.  There was no distant disease and MRI brain was negative.  She established care with medical oncology and thoracic surgery, with recommendation to proceed with definitive chemoRT.    We reviewed that given the patient's stage IIIB disease, multidisciplinary consensus is to proceed with definitive concurrent chemoradiation therapy.   In this regard, I recommend 6 weeks of external beam radiation therapy (60Gy in 30fx) in an effort to control this tumor. We discussed the potential acute and late side effects, which include, but are not limited to fatigue, radiation esophagitis, esophageal stricture, radiation pneumonitis, acute skin reaction, pulmonary fibrosis, increased risk of cardiac events and secondary malignancy.    The patient agreed to proceed with radiation therapy, and informed consent was signed. CT simulation scheduled at [facility]. She has medical oncology f/u scheduled next week.     Thank you for the opportunity to participate in the care of this patient.    Honey Gamboa MD  Department of Radiation Oncology  [facility]    Orders Placed This Encounter   Procedures   • Radiation Simulation Treatment     Total Time Spent  55 minutes spent reviewing  EMR in preparation for visit, with the patient, coordination with other providers, and documentation.    CHIEF COMPLAINT  Chief Complaint   Patient presents with   • Lung Cancer       History of Present Illness  Referred by Dr. Castro for right lung adenocarcinoma to discuss concurrent chemoradiation.     Patient has history of hypertension, psoriatic arthritis, CKD stage III, hyperlipidemia, latent TB, essential thrombocytosis on hydroxyurea, former smoker, she quit in 2010.   She had pneumonia in October and then again in February.  She was found to have a RUL mass with mediastinal adenopathy on CT. She underwent EBUS that showed metastatic NSCLC compatible with primary lung adenocarcinoma in lymph nodes 4R, 4L and 10R. She is s/p PET/CT and MRI brain did not show any evidence of brain metastasis. Her case was discussed at Thoracic tumor board on 4/15/24 with recommendations for definitive chemoradiation followed by immunotherapy.        2/28/24 CT chest wo contrast  1. 4.1 x 3.4 cm right upper lobe mass. Differential considerations include bronchogenic malignancy or pneumonia. If the patient does not have signs of pulmonary infection, FDG PET/CT and tissue sampling is recommended for further evaluation. If the patient does have clinical signs of pulmonary infection, recommend treatment and follow-up CT in approximately 2 months for reassessment.   2. 1.5 cm spiculated nodule adjacent to this mass, which may represent satellite nodule or infectious/inflammatory process.   3. 2.5 x 2.1 cm left upper lobe groundglass nodule. This be infectious/inflammatory or represent a separate primary lung adenocarcinoma spectrum lesion.   4. 2 enlarged right lower paratracheal lymph nodes, possibly malignant yaw involvement. This can either be assessed on follow-up FDG PET/CT.      3/12/24 Complete PFTs  Pre-bronchodilator results:  FEV1/FVC Ratio: 53 %  Forced Vital Capacity: 3.23 L           126 % predicted  FEV1: 1.72 L    86 % predicted     Post-bronchodilator results:  FEV1/FVC Ratio: 61%  Forced Vital Capacity: 3.09L, 121% predicted, -5% change  FEV1: 1.88L, 95% predicted, 8% change   Lung volumes by body plethysmography:   Total Lung Capacity 112 % predicted   Residual volume 117 % predicted   DLCO corrected for patients hemoglobin level: 79 %      Interpretation:   Mild obstructive airflow defect on spirometry   No significant response to the administration to bronchodilator per ATS standards   Normal lung volumes   Mildly reduced diffusion capacity   Negative desaturation study    3/22/24 PET/CT  1. Intensely FDG avid right upper lobe mass compatible with malignancy.   2. FDG avid lymphadenopathy in the mediastinum and right perihilar region suspicious for metastasis.   3. Minimal FDG uptake in the 2.5 cm left upper lobe groundglass nodule, nonspecific, but low-grade adenocarcinoma is not excluded.   4. No findings for hypermetabolic metastasis in the neck, abdomen and pelvis.    3/26/24 EBUS  A-C. Lymph Node, Level 4R :    - Metastatic non-small cell carcinoma, most compatible with a primary lung adenocarcinoma;      D-F. Lymph Node, Level 10R:    - Metastatic non-small cell carcinoma;      G-I. Lymph Node, Level 4L:    - Metastatic non-small cell carcinoma;    3/28/24 MRI brain  No acute infarction, edema, or mass effect.   There is no pathologic area of intra-axial enhancement to suggest metastatic disease.   Moderate chronic microangiopathic ischemic changes, progressed since 2015.    4/10/24 Med OncDr. Castro  resectable Stage IIIA NSCLC consists of systemic treatment combined with local approach with radiation and/or surgery. Strategies include surgery followed by adjuvant chemotherapy neoadjuvant chemotherapy or chemoimmunotherapy followed by surgery, neoadjuvant chemoradiotherapy followed by surgery or concurrent or sequential chemotherapy with definitive radiation therapy.   Plan: Discussion tumor board, Rad Onc  referral, CT surgery evaluation  Follow-up in 1-2 weeks     4/15/24 Pulmonology, Dr. Beebe  [id number] Stage IIIB adenocarcinoma. Her pathology from EBUS shows contralateral 4L yaw disease making her N3 and upstaging her to Stage IIIB. This is assuming the other RUL spiculated lesion is benign. Given this staging and contralateral yaw disease she would best be served by a definitive chemotherapy-radiation plan followed by immunotherapy. Updated patient after tumor board discussion.    Upcoming appts:  4/29/24 Med Onc, Dr. Castro  6/11/24 Pulmonology    Today, the patient feels well overall.  She has occasional cough when lying down and post nasal drip. She has some generalized symptoms managed by her PCP.    Oncology History   Malignant neoplasm of upper lobe, right bronchus or lung (HCC)   2024 Initial Diagnosis    Primary lung adenocarcinoma, right (HCC)     3/26/2024 Biopsy    A-C. Lymph Node, Level 4R :    - Metastatic non-small cell carcinoma, most compatible with a primary lung adenocarcinoma; see note.       D-F. Lymph Node, Level 10R:    - Metastatic non-small cell carcinoma; see note.       G-I. Lymph Node, Level 4L:    - Metastatic non-small cell carcinoma; see note.       4/15/2024 -  Cancer Staged    Staging form: Lung, AJCC 8th Edition  - Clinical stage from 4/15/2024: Stage IIIB (cT2b, cN3, cM0) - Signed by Rogelio Beebe DO on 4/15/2024         Historical Information   Past Medical History:   Diagnosis Date   • Allergic 2022    Allergic to neomiacin and cephalexin   • Essential thrombocytosis (HCC)     controlled with medication   • Hyperlipidemia    • Hypertension    • Mass in chest    • Nodular goiter    • Psoriasis      Past Surgical History:   Procedure Laterality Date   • APPENDECTOMY     • BREAST CYST EXCISION Right 2009   • COLONOSCOPY  10/31/2018   • DXA PROCEDURE (HISTORICAL)  04/21/2017   • MAMMO (HISTORICAL)      8-24-18   • MAMMO NEEDLE LOCALIZATION RIGHT (ALL INC) Right  2009   • SKIN LESION EXCISION      bridge of nose     Family History   Problem Relation Age of Onset   • Stroke Mother    • Depression Mother            • Hypertension Mother    • Hearing loss Mother    • Tuberculosis Father    • Cancer Brother         lung   • Tuberculosis Brother    • Coronary artery disease Brother    • Hypertension Brother    • No Known Problems Daughter    • No Known Problems Daughter    • No Known Problems Maternal Grandmother    • No Known Problems Maternal Grandfather    • No Known Problems Paternal Grandmother    • No Known Problems Paternal Grandfather    • No Known Problems Maternal Aunt    • No Known Problems Maternal Aunt    • No Known Problems Maternal Aunt    • No Known Problems Maternal Aunt    • No Known Problems Paternal Aunt    • Cancer Brother         Throat canver     Social History   Social History     Substance and Sexual Activity   Alcohol Use Not Currently     Social History     Substance and Sexual Activity   Drug Use Never     Social History     Tobacco Use   Smoking Status Former   • Current packs/day: 1.00   • Average packs/day: 1 pack/day for 58.3 years (58.3 ttl pk-yrs)   • Types: Cigarettes   • Start date:    Smokeless Tobacco Never   Tobacco Comments    Quit      Meds/Allergies     Current Outpatient Medications:   •  amLODIPine (NORVASC) 5 mg tablet, Take 1 tablet (5 mg total) by mouth 2 (two) times a day, Disp: 180 tablet, Rfl: 1  •  Apremilast 30 MG TABS, 30 mg Every 12 hours , Disp: , Rfl:   •  aspirin 81 MG tablet, Take 81 mg by mouth daily , Disp: , Rfl:   •  Cholecalciferol (VITAMIN D3) 5000 units TABS, 5,000 Units Daily , Disp: , Rfl:   •  diphenhydrAMINE (BENADRYL) 25 mg capsule, Take 25 mg by mouth every 12 (twelve) hours as needed for itching , Disp: , Rfl:   •  Fluocinolone Acetonide Scalp 0.01 % OIL, , Disp: , Rfl:   •  halobetasol (ULTRAVATE) 0.05 % ointment,  , Disp: , Rfl:   •  hydrocortisone 2.5 % ointment, , Disp: , Rfl:   •   "hydroxyurea (HYDREA) 500 mg capsule, Take 1 capsule (500 mg total) by mouth daily, Disp: 90 capsule, Rfl: 3  •  losartan (COZAAR) 50 mg tablet, Take 1 tablet (50 mg total) by mouth daily, Disp: 90 tablet, Rfl: 1  •  Probiotic Product (PROBIOTIC DAILY PO), Take 1 capsule by mouth daily, Disp: , Rfl:   •  vitamin B-12 (VITAMIN B-12) 1,000 mcg tablet, Take 1 tablet (1,000 mcg total) by mouth daily, Disp: 90 tablet, Rfl: 1  Allergies   Allergen Reactions   • Doxycycline Headache   • Keflex [Cephalexin] Rash   • Neomycin-Polymyxin-Dexameth Eye Swelling       Pathology:  See above.    Review of Systems  Refer to nursing note    OBJECTIVE:   /60   Pulse 68   Temp 97.9 °F (36.6 °C)   Resp 14   Ht 5' 2\" (1.575 m)   Wt 51.5 kg (113 lb 9.6 oz)   SpO2 98%   BMI 20.78 kg/m²   Pain Assessment:  0  Performance Status: ECO - Asymptomatic    Physical Exam  General Appearance:  Alert, cooperative, no distress, appears stated age  Cardiovascular:  Extremities warm and well perfused  Lungs: Respirations unlabored, no cyanosis, able to speak in complete sentences without dyspnea.  Skin: No generalized rash or dermatitis  Neurologic: ANOx3, speech and cognition intact.    Portions of the record may have been created with voice recognition software.  Occasional wrong word or \"sound a like\" substitutions may have occurred due to the inherent limitations of voice recognition software.  Read the chart carefully and recognize, using context, where substitutions have occurred.  "

## 2024-04-23 NOTE — PROGRESS NOTES
Ayla Nye 1950 is a 73 y.o. femaleReferred by Dr. Castro for right lung adenocarcinoma to discuss concurrent chemoradiation.     Patient has history of hypertension, psoriatic arthritis, CKD stage III, hyperlipidemia, latent TB, essential thrombocytosis on hydroxyurea, former smoker, she quit in 2010.   She had pneumonia in October and then again in February.  She was found to have a RUL mass with mediastinal adenopathy on CT. She underwent EBUS that showed metastatic NSCLC compatible with primary lung adenocarcinoma in lymph nodes 4R, 4L and 10R. She is s/p PET/CT and MRI brain did not show any evidence of brain metastasis. Her case was discussed at Thoracic tumor board on 4/15/24 with recommendations for definitive chemoradiation followed by immunotherapy.        2/28/24 CT chest wo contrast  1. 4.1 x 3.4 cm right upper lobe mass. Differential considerations include bronchogenic malignancy or pneumonia. If the patient does not have signs of pulmonary infection, FDG PET/CT and tissue sampling is recommended for further evaluation. If the patient does have clinical signs of pulmonary infection, recommend treatment and follow-up CT in approximately 2 months for reassessment.   2. 1.5 cm spiculated nodule adjacent to this mass, which may represent satellite nodule or infectious/inflammatory process.   3. 2.5 x 2.1 cm left upper lobe groundglass nodule. This be infectious/inflammatory or represent a separate primary lung adenocarcinoma spectrum lesion.   4. 2 enlarged right lower paratracheal lymph nodes, possibly malignant yaw involvement. This can either be assessed on follow-up FDG PET/CT.      3/12/24 Complete PFTs  Pre-bronchodilator results:  FEV1/FVC Ratio: 53 %  Forced Vital Capacity: 3.23 L           126 % predicted  FEV1: 1.72 L   86 % predicted     Post-bronchodilator results:  FEV1/FVC Ratio: 61%  Forced Vital Capacity: 3.09L, 121% predicted, -5% change  FEV1: 1.88L, 95% predicted, 8%  change   Lung volumes by body plethysmography:   Total Lung Capacity 112 % predicted   Residual volume 117 % predicted   DLCO corrected for patients hemoglobin level: 79 %      Interpretation:   Mild obstructive airflow defect on spirometry   No significant response to the administration to bronchodilator per ATS standards   Normal lung volumes   Mildly reduced diffusion capacity   Negative desaturation study      3/22/24 PET/CT  1. Intensely FDG avid right upper lobe mass compatible with malignancy.   2. FDG avid lymphadenopathy in the mediastinum and right perihilar region suspicious for metastasis.   3. Minimal FDG uptake in the 2.5 cm left upper lobe groundglass nodule, nonspecific, but low-grade adenocarcinoma is not excluded.   4. No findings for hypermetabolic metastasis in the neck, abdomen and pelvis.      3/26/24 EBUS  A-C. Lymph Node, Level 4R :    - Metastatic non-small cell carcinoma, most compatible with a primary lung adenocarcinoma;      D-F. Lymph Node, Level 10R:    - Metastatic non-small cell carcinoma;      G-I. Lymph Node, Level 4L:    - Metastatic non-small cell carcinoma;      3/28/24 MRI brain  No acute infarction, edema, or mass effect.   There is no pathologic area of intra-axial enhancement to suggest metastatic disease.   Moderate chronic microangiopathic ischemic changes, progressed since 2015.       4/10/24 Med Onc, Dr. Castro  resectable Stage IIIA NSCLC consists of systemic treatment combined with local approach with radiation and/or surgery. Strategies include surgery followed by adjuvant chemotherapy neoadjuvant chemotherapy or chemoimmunotherapy followed by surgery, neoadjuvant chemoradiotherapy followed by surgery or concurrent or sequential chemotherapy with definitive radiation therapy.   Plan: Discussion tumor board, Rad Onc referral, CT surgery evaluation  Follow-up in 1-2 weeks       4/15/24 Pulmonology, Dr. Beebe  [id number] Stage IIIB adenocarcinoma. Her pathology from  EBUS shows contralateral 4L yaw disease making her N3 and upstaging her to Stage IIIB. This is assuming the other RUL spiculated lesion is benign. Given this staging and contralateral yaw disease she would best be served by a definitive chemotherapy-radiation plan followed by immunotherapy. Updated patient after tumor board discussion.        Upcoming appts:  4/29/24 Med Onc, Dr. Castro  6/11/24 Pulmonology    Oncology History Overview Note   Referred by Dr. Castro for right lung adenocarcinoma to discuss concurrent chemoradiation.     Patient has history of hypertension, psoriatic arthritis, CKD stage III, hyperlipidemia, latent TB, essential thrombocytosis on hydroxyurea, former smoker, she quit in 2010.   She had pneumonia in October and then again in February.  She was found to have a RUL mass with mediastinal adenopathy on CT. She underwent EBUS that showed metastatic NSCLC compatible with primary lung adenocarcinoma in lymph nodes 4R, 4L and 10R. She is s/p PET/CT and MRI brain did not show any evidence of brain metastasis. Her case was discussed at Thoracic tumor board on 4/15/24 with recommendations for definitive chemoradiation followed by immunotherapy.        2/28/24 CT chest wo contrast  1. 4.1 x 3.4 cm right upper lobe mass. Differential considerations include bronchogenic malignancy or pneumonia. If the patient does not have signs of pulmonary infection, FDG PET/CT and tissue sampling is recommended for further evaluation. If the patient does have clinical signs of pulmonary infection, recommend treatment and follow-up CT in approximately 2 months for reassessment.   2. 1.5 cm spiculated nodule adjacent to this mass, which may represent satellite nodule or infectious/inflammatory process.   3. 2.5 x 2.1 cm left upper lobe groundglass nodule. This be infectious/inflammatory or represent a separate primary lung adenocarcinoma spectrum lesion.   4. 2 enlarged right lower paratracheal lymph nodes,  possibly malignant yaw involvement. This can either be assessed on follow-up FDG PET/CT.      3/12/24 Complete PFTs  Pre-bronchodilator results:  FEV1/FVC Ratio: 53 %  Forced Vital Capacity: 3.23 L           126 % predicted  FEV1: 1.72 L   86 % predicted     Post-bronchodilator results:  FEV1/FVC Ratio: 61%  Forced Vital Capacity: 3.09L, 121% predicted, -5% change  FEV1: 1.88L, 95% predicted, 8% change   Lung volumes by body plethysmography:   Total Lung Capacity 112 % predicted   Residual volume 117 % predicted   DLCO corrected for patients hemoglobin level: 79 %      Interpretation:   Mild obstructive airflow defect on spirometry   No significant response to the administration to bronchodilator per ATS standards   Normal lung volumes   Mildly reduced diffusion capacity   Negative desaturation study      3/22/24 PET/CT  1. Intensely FDG avid right upper lobe mass compatible with malignancy.   2. FDG avid lymphadenopathy in the mediastinum and right perihilar region suspicious for metastasis.   3. Minimal FDG uptake in the 2.5 cm left upper lobe groundglass nodule, nonspecific, but low-grade adenocarcinoma is not excluded.   4. No findings for hypermetabolic metastasis in the neck, abdomen and pelvis.      3/26/24 EBUS  A-C. Lymph Node, Level 4R :    - Metastatic non-small cell carcinoma, most compatible with a primary lung adenocarcinoma;      D-F. Lymph Node, Level 10R:    - Metastatic non-small cell carcinoma;      G-I. Lymph Node, Level 4L:    - Metastatic non-small cell carcinoma;      3/28/24 MRI brain  No acute infarction, edema, or mass effect.   There is no pathologic area of intra-axial enhancement to suggest metastatic disease.   Moderate chronic microangiopathic ischemic changes, progressed since 2015.       4/10/24 Med Onc, Dr. Castro  resectable Stage IIIA NSCLC consists of systemic treatment combined with local approach with radiation and/or surgery. Strategies include surgery followed by adjuvant  chemotherapy neoadjuvant chemotherapy or chemoimmunotherapy followed by surgery, neoadjuvant chemoradiotherapy followed by surgery or concurrent or sequential chemotherapy with definitive radiation therapy.   Plan: Discussion tumor board, Rad Onc referral, CT surgery evaluation  Follow-up in 1-2 weeks       4/15/24 Pulmonology, Dr. Beebe  [id number] Stage IIIB adenocarcinoma. Her pathology from EBUS shows contralateral 4L yaw disease making her N3 and upstaging her to Stage IIIB. This is assuming the other RUL spiculated lesion is benign. Given this staging and contralateral yaw disease she would best be served by a definitive chemotherapy-radiation plan followed by immunotherapy. Updated patient after tumor board discussion.        Upcoming appts:  4/29/24 Med Onc, Dr. Castro  6/11/24 Pulmonology     Malignant neoplasm of upper lobe, right bronchus or lung (HCC)   2024 Initial Diagnosis    Primary lung adenocarcinoma, right (HCC)     3/26/2024 Biopsy    A-C. Lymph Node, Level 4R :    - Metastatic non-small cell carcinoma, most compatible with a primary lung adenocarcinoma; see note.       D-F. Lymph Node, Level 10R:    - Metastatic non-small cell carcinoma; see note.       G-I. Lymph Node, Level 4L:    - Metastatic non-small cell carcinoma; see note.       4/15/2024 -  Cancer Staged    Staging form: Lung, AJCC 8th Edition  - Clinical stage from 4/15/2024: Stage IIIB (cT2b, cN3, cM0) - Signed by Rogelio Beebe DO on 4/15/2024           Review of Systems:  Review of Systems   Constitutional:  Positive for appetite change and diaphoresis (night sweats past few weeks with joint pain - saw PMD.  Possibly related to statin, which she just stopped.).        Feels very weak.   HENT:  Positive for postnasal drip.    Respiratory:  Positive for cough (at night).    Gastrointestinal: Negative.    Endocrine: Negative.    Genitourinary: Negative.    Musculoskeletal:  Positive for arthralgias and gait problem (joint  related pain).   Skin: Negative.    Neurological:  Positive for weakness.   Hematological: Negative.    Psychiatric/Behavioral: Negative.         Clinical Trial: no    OB/GYN History:  The patient underwent menarche at 14 years  Menopause Status Post  No LMP recorded. Patient is postmenopausal.  Menopause at 50} years.  Menopause Reason natural  Hormone replacement therapy: yes.  Years used5   2   Para 2   Age at first delivery being 27 years.   Nursing: no.   Birth control pills: yes.  Years used15    Pregnancy test needed:  no    ONCOTYPE/MAMMOPRINT results    PFT complete    Prior Radiation no    Teaching yes    MST no    Implantable Devices (Port, Pacemaker, pain stimulator)no    Hip Replacement no      [unfilled]  Health Maintenance   Topic Date Due    Zoster Vaccine (1 of 2) 2014    COVID-19 Vaccine ( season) 2023    Fall Risk  2024    Urinary Incontinence Screening  2024    Medicare Annual Wellness Visit (AWV)  2024    Breast Cancer Screening: Mammogram  10/25/2024    Depression Screening  2024    BMI: Adult  04/15/2025    DXA SCAN  10/25/2028    Colorectal Cancer Screening  2029    Hepatitis C Screening  Completed    Osteoporosis Screening  Completed    Pneumococcal Vaccine: 65+ Years  Completed    Influenza Vaccine  Completed    HIB Vaccine  Aged Out    IPV Vaccine  Aged Out    Hepatitis A Vaccine  Aged Out    Meningococcal ACWY Vaccine  Aged Out    HPV Vaccine  Aged Out     Past Medical History:   Diagnosis Date    Allergic     Allergic to neomiacin and cephalexin    Essential thrombocytosis (HCC)     controlled with medication    Hyperlipidemia     Hypertension     Mass in chest     Nodular goiter     Psoriasis      Past Surgical History:   Procedure Laterality Date    APPENDECTOMY      BREAST CYST EXCISION Right 2009    COLONOSCOPY  10/31/2018    DXA PROCEDURE (HISTORICAL)  2017    MAMMO (HISTORICAL)      18    MAMMO  NEEDLE LOCALIZATION RIGHT (ALL INC) Right 2009    SKIN LESION EXCISION      bridge of nose     Family History   Problem Relation Age of Onset    Stroke Mother     Depression Mother             Hypertension Mother     Hearing loss Mother     Tuberculosis Father     Cancer Brother         lung    Tuberculosis Brother     Coronary artery disease Brother     Hypertension Brother     No Known Problems Daughter     No Known Problems Daughter     No Known Problems Maternal Grandmother     No Known Problems Maternal Grandfather     No Known Problems Paternal Grandmother     No Known Problems Paternal Grandfather     No Known Problems Maternal Aunt     No Known Problems Maternal Aunt     No Known Problems Maternal Aunt     No Known Problems Maternal Aunt     No Known Problems Paternal Aunt     Cancer Brother         Throat canver     Social History     Tobacco Use    Smoking status: Former     Current packs/day: 1.00     Average packs/day: 1 pack/day for 58.3 years (58.3 ttl pk-yrs)     Types: Cigarettes     Start date:     Smokeless tobacco: Never    Tobacco comments:     Quit    Vaping Use    Vaping status: Never Used   Substance Use Topics    Alcohol use: Not Currently    Drug use: Never        Current Outpatient Medications:     amLODIPine (NORVASC) 5 mg tablet, Take 1 tablet (5 mg total) by mouth 2 (two) times a day, Disp: 180 tablet, Rfl: 1    Apremilast 30 MG TABS, 30 mg Every 12 hours , Disp: , Rfl:     aspirin 81 MG tablet, Take 81 mg by mouth daily , Disp: , Rfl:     Cholecalciferol (VITAMIN D3) 5000 units TABS, 5,000 Units Daily , Disp: , Rfl:     diphenhydrAMINE (BENADRYL) 25 mg capsule, Take 25 mg by mouth every 12 (twelve) hours as needed for itching , Disp: , Rfl:     Fluocinolone Acetonide Scalp 0.01 % OIL, , Disp: , Rfl:     halobetasol (ULTRAVATE) 0.05 % ointment,  , Disp: , Rfl:     hydrocortisone 2.5 % ointment, , Disp: , Rfl:     hydroxyurea (HYDREA) 500 mg capsule, Take 1 capsule  "(500 mg total) by mouth daily, Disp: 90 capsule, Rfl: 3    losartan (COZAAR) 50 mg tablet, Take 1 tablet (50 mg total) by mouth daily, Disp: 90 tablet, Rfl: 1    Probiotic Product (PROBIOTIC DAILY PO), Take 1 capsule by mouth daily, Disp: , Rfl:     vitamin B-12 (VITAMIN B-12) 1,000 mcg tablet, Take 1 tablet (1,000 mcg total) by mouth daily, Disp: 90 tablet, Rfl: 1  Allergies   Allergen Reactions    Doxycycline Headache    Keflex [Cephalexin] Rash    Neomycin-Polymyxin-Dexameth Eye Swelling      Vitals:    04/23/24 0947   BP: 115/60   Pulse: 68   Resp: 14   Temp: 97.9 °F (36.6 °C)   SpO2: 98%   Weight: 51.5 kg (113 lb 9.6 oz)   Height: 5' 2\" (1.575 m)     Pain Score: 0-No pain  "

## 2024-04-23 NOTE — LETTER
2024     Rosalinda Castro MD  [address] [place] [id number]    Patient: Ayla Nye   YOB: 1950   Date of Visit: 2024       Dear Dr. Castro:    Thank you for referring Ayla Nye to me for evaluation. Below are my notes for this consultation.    If you have questions, please do not hesitate to call me. I look forward to following your patient along with you.         Sincerely,        Honey Gamboa MD        CC: No Recipients    Honey Gamboa MD  2024 10:42 AM  Signed  Consultation - Radiation Oncology      Patient Name: Ayla Nye MRN:[id number] : 1950  Encounter: [id number]  Referring Provider: Rosalinda Castro MD     Cancer Staging   Malignant neoplasm of upper lobe, right bronchus or lung (HCC)  Staging form: Lung, AJCC 8th Edition  - Clinical stage from 4/15/2024: Stage IIIB (cT2b, cN3, cM0) - Signed by Rogelio Beebe DO on 4/15/2024    ASSESSMENT & PLAN  Ayla Nye is a 73 y.o. female with [id number] (IIIB) NSCLC of the right upper lobe s/p EBUS and biopsy on 3/26/24 confirming adenocarcinoma in levels 4R, 4L, and 10R.  PET/CT on 3/22/2024 showed a 4.2 cm mass in the right upper lobe with adjacent satellite nodule, as well as hilar and mediastinal lymphadenopathy.  There was no distant disease and MRI brain was negative.  She established care with medical oncology and thoracic surgery, with recommendation to proceed with definitive chemoRT.    We reviewed that given the patient's stage IIIB disease, multidisciplinary consensus is to proceed with definitive concurrent chemoradiation therapy.   In this regard, I recommend 6 weeks of external beam radiation therapy (60Gy in 30fx) in an effort to control this tumor. We discussed the potential acute and late side effects, which include, but are not limited to fatigue, radiation esophagitis, esophageal stricture, radiation pneumonitis, acute skin reaction, pulmonary fibrosis,  increased risk of cardiac events and secondary malignancy.    The patient agreed to proceed with radiation therapy, and informed consent was signed. CT simulation scheduled at [facility]. She has medical oncology f/u scheduled next week.     Thank you for the opportunity to participate in the care of this patient.    Honey Gamboa MD  Department of Radiation Oncology  [facility]    Orders Placed This Encounter   Procedures   • Radiation Simulation Treatment     Total Time Spent  55 minutes spent reviewing EMR in preparation for visit, with the patient, coordination with other providers, and documentation.    CHIEF COMPLAINT  Chief Complaint   Patient presents with   • Lung Cancer       History of Present Illness  Referred by Dr. Castro for right lung adenocarcinoma to discuss concurrent chemoradiation.     Patient has history of hypertension, psoriatic arthritis, CKD stage III, hyperlipidemia, latent TB, essential thrombocytosis on hydroxyurea, former smoker, she quit in 2010.   She had pneumonia in October and then again in February.  She was found to have a RUL mass with mediastinal adenopathy on CT. She underwent EBUS that showed metastatic NSCLC compatible with primary lung adenocarcinoma in lymph nodes 4R, 4L and 10R. She is s/p PET/CT and MRI brain did not show any evidence of brain metastasis. Her case was discussed at Thoracic tumor board on 4/15/24 with recommendations for definitive chemoradiation followed by immunotherapy.        2/28/24 CT chest wo contrast  1. 4.1 x 3.4 cm right upper lobe mass. Differential considerations include bronchogenic malignancy or pneumonia. If the patient does not have signs of pulmonary infection, FDG PET/CT and tissue sampling is recommended for further evaluation. If the patient does have clinical signs of pulmonary infection, recommend treatment and follow-up CT in approximately 2 months for reassessment.   2. 1.5 cm spiculated nodule adjacent  to this mass, which may represent satellite nodule or infectious/inflammatory process.   3. 2.5 x 2.1 cm left upper lobe groundglass nodule. This be infectious/inflammatory or represent a separate primary lung adenocarcinoma spectrum lesion.   4. 2 enlarged right lower paratracheal lymph nodes, possibly malignant yaw involvement. This can either be assessed on follow-up FDG PET/CT.      3/12/24 Complete PFTs  Pre-bronchodilator results:  FEV1/FVC Ratio: 53 %  Forced Vital Capacity: 3.23 L           126 % predicted  FEV1: 1.72 L   86 % predicted     Post-bronchodilator results:  FEV1/FVC Ratio: 61%  Forced Vital Capacity: 3.09L, 121% predicted, -5% change  FEV1: 1.88L, 95% predicted, 8% change   Lung volumes by body plethysmography:   Total Lung Capacity 112 % predicted   Residual volume 117 % predicted   DLCO corrected for patients hemoglobin level: 79 %      Interpretation:   Mild obstructive airflow defect on spirometry   No significant response to the administration to bronchodilator per ATS standards   Normal lung volumes   Mildly reduced diffusion capacity   Negative desaturation study    3/22/24 PET/CT  1. Intensely FDG avid right upper lobe mass compatible with malignancy.   2. FDG avid lymphadenopathy in the mediastinum and right perihilar region suspicious for metastasis.   3. Minimal FDG uptake in the 2.5 cm left upper lobe groundglass nodule, nonspecific, but low-grade adenocarcinoma is not excluded.   4. No findings for hypermetabolic metastasis in the neck, abdomen and pelvis.    3/26/24 EBUS  A-C. Lymph Node, Level 4R :    - Metastatic non-small cell carcinoma, most compatible with a primary lung adenocarcinoma;      D-F. Lymph Node, Level 10R:    - Metastatic non-small cell carcinoma;      G-I. Lymph Node, Level 4L:    - Metastatic non-small cell carcinoma;    3/28/24 MRI brain  No acute infarction, edema, or mass effect.   There is no pathologic area of intra-axial enhancement to suggest metastatic  disease.   Moderate chronic microangiopathic ischemic changes, progressed since 2015.    4/10/24 Med OncDr. Castro  resectable Stage IIIA NSCLC consists of systemic treatment combined with local approach with radiation and/or surgery. Strategies include surgery followed by adjuvant chemotherapy neoadjuvant chemotherapy or chemoimmunotherapy followed by surgery, neoadjuvant chemoradiotherapy followed by surgery or concurrent or sequential chemotherapy with definitive radiation therapy.   Plan: Discussion tumor board, Rad Onc referral, CT surgery evaluation  Follow-up in 1-2 weeks     4/15/24 Pulmonology, Dr. Beebe  [id number] Stage IIIB adenocarcinoma. Her pathology from EBUS shows contralateral 4L yaw disease making her N3 and upstaging her to Stage IIIB. This is assuming the other RUL spiculated lesion is benign. Given this staging and contralateral yaw disease she would best be served by a definitive chemotherapy-radiation plan followed by immunotherapy. Updated patient after tumor board discussion.    Upcoming appts:  4/29/24 Med OncDr. Castro  6/11/24 Pulmonology    Today, the patient feels well overall.  She has occasional cough when lying down and post nasal drip. She has some generalized symptoms managed by her PCP.    Oncology History   Malignant neoplasm of upper lobe, right bronchus or lung (HCC)   2024 Initial Diagnosis    Primary lung adenocarcinoma, right (HCC)     3/26/2024 Biopsy    A-C. Lymph Node, Level 4R :    - Metastatic non-small cell carcinoma, most compatible with a primary lung adenocarcinoma; see note.       D-F. Lymph Node, Level 10R:    - Metastatic non-small cell carcinoma; see note.       G-I. Lymph Node, Level 4L:    - Metastatic non-small cell carcinoma; see note.       4/15/2024 -  Cancer Staged    Staging form: Lung, AJCC 8th Edition  - Clinical stage from 4/15/2024: Stage IIIB (cT2b, cN3, cM0) - Signed by Rogelio Beebe DO on 4/15/2024         Historical  Information  Past Medical History:   Diagnosis Date   • Allergic     Allergic to neomiacin and cephalexin   • Essential thrombocytosis (HCC)     controlled with medication   • Hyperlipidemia    • Hypertension    • Mass in chest    • Nodular goiter    • Psoriasis      Past Surgical History:   Procedure Laterality Date   • APPENDECTOMY     • BREAST CYST EXCISION Right    • COLONOSCOPY  10/31/2018   • DXA PROCEDURE (HISTORICAL)  2017   • MAMMO (HISTORICAL)      -   • MAMMO NEEDLE LOCALIZATION RIGHT (ALL INC) Right 2009   • SKIN LESION EXCISION      bridge of nose     Family History   Problem Relation Age of Onset   • Stroke Mother    • Depression Mother            • Hypertension Mother    • Hearing loss Mother    • Tuberculosis Father    • Cancer Brother         lung   • Tuberculosis Brother    • Coronary artery disease Brother    • Hypertension Brother    • No Known Problems Daughter    • No Known Problems Daughter    • No Known Problems Maternal Grandmother    • No Known Problems Maternal Grandfather    • No Known Problems Paternal Grandmother    • No Known Problems Paternal Grandfather    • No Known Problems Maternal Aunt    • No Known Problems Maternal Aunt    • No Known Problems Maternal Aunt    • No Known Problems Maternal Aunt    • No Known Problems Paternal Aunt    • Cancer Brother         Throat canver     Social History  Social History     Substance and Sexual Activity   Alcohol Use Not Currently     Social History     Substance and Sexual Activity   Drug Use Never     Social History     Tobacco Use   Smoking Status Former   • Current packs/day: 1.00   • Average packs/day: 1 pack/day for 58.3 years (58.3 ttl pk-yrs)   • Types: Cigarettes   • Start date:    Smokeless Tobacco Never   Tobacco Comments    Quit      Meds/Allergies    Current Outpatient Medications:   •  amLODIPine (NORVASC) 5 mg tablet, Take 1 tablet (5 mg total) by mouth 2 (two) times a day, Disp: 180  "tablet, Rfl: 1  •  Apremilast 30 MG TABS, 30 mg Every 12 hours , Disp: , Rfl:   •  aspirin 81 MG tablet, Take 81 mg by mouth daily , Disp: , Rfl:   •  Cholecalciferol (VITAMIN D3) 5000 units TABS, 5,000 Units Daily , Disp: , Rfl:   •  diphenhydrAMINE (BENADRYL) 25 mg capsule, Take 25 mg by mouth every 12 (twelve) hours as needed for itching , Disp: , Rfl:   •  Fluocinolone Acetonide Scalp 0.01 % OIL, , Disp: , Rfl:   •  halobetasol (ULTRAVATE) 0.05 % ointment,  , Disp: , Rfl:   •  hydrocortisone 2.5 % ointment, , Disp: , Rfl:   •  hydroxyurea (HYDREA) 500 mg capsule, Take 1 capsule (500 mg total) by mouth daily, Disp: 90 capsule, Rfl: 3  •  losartan (COZAAR) 50 mg tablet, Take 1 tablet (50 mg total) by mouth daily, Disp: 90 tablet, Rfl: 1  •  Probiotic Product (PROBIOTIC DAILY PO), Take 1 capsule by mouth daily, Disp: , Rfl:   •  vitamin B-12 (VITAMIN B-12) 1,000 mcg tablet, Take 1 tablet (1,000 mcg total) by mouth daily, Disp: 90 tablet, Rfl: 1  Allergies   Allergen Reactions   • Doxycycline Headache   • Keflex [Cephalexin] Rash   • Neomycin-Polymyxin-Dexameth Eye Swelling      Pathology:  See above.    Review of Systems  Refer to nursing note    OBJECTIVE:   /60   Pulse 68   Temp 97.9 °F (36.6 °C)   Resp 14   Ht 5' 2\" (1.575 m)   Wt 51.5 kg (113 lb 9.6 oz)   SpO2 98%   BMI 20.78 kg/m²   Pain Assessment:  0  Performance Status: ECO - Asymptomatic    Physical Exam  General Appearance:  Alert, cooperative, no distress, appears stated age  Cardiovascular:  Extremities warm and well perfused  Lungs: Respirations unlabored, no cyanosis, able to speak in complete sentences without dyspnea.  Skin: No generalized rash or dermatitis  Neurologic: ANOx3, speech and cognition intact.    Portions of the record may have been created with voice recognition software.  Occasional wrong word or \"sound a like\" substitutions may have occurred due to the inherent limitations of voice recognition software.  Read the " chart carefully and recognize, using context, where substitutions have occurred.

## 2024-04-26 NOTE — PROGRESS NOTES
HEMATOLOGY / ONCOLOGY CLINIC FOLLOW UP NOTE    Patient Ayla Nye  MRN: [id number]  : 1950  Date of Encounter 2024          Referring Provider:  JERE Hale 3/5/2024        Reason for Encounter: follow up ET and new lung mass       Reason for Encounter: follow up Stage IIIB NSCLC       Oncology History   Malignant neoplasm of upper lobe, right bronchus or lung (HCC)    Initial Diagnosis    Primary lung adenocarcinoma, right (HCC)     3/26/2024 Biopsy    A-C. Lymph Node, Level 4R :    - Metastatic non-small cell carcinoma, most compatible with a primary lung adenocarcinoma; see note.       D-F. Lymph Node, Level 10R:    - Metastatic non-small cell carcinoma; see note.       G-I. Lymph Node, Level 4L:    - Metastatic non-small cell carcinoma; see note.       4/15/2024 -  Cancer Staged    Staging form: Lung, AJCC 8th Edition  - Clinical stage from 4/15/2024: Stage IIIB (cT2b, cN3, cM0) - Signed by Rogelio Beebe DO on 4/15/2024                 Discussion      cT2b N3 M0 Stage IIIB adenocarcinoma lung     Caris pending         Stage IIIB NSCLC        The optimal therapy of resectable Stage III NSCLC consists of systemic treatment combined with local approach with radiation and/or surgery.  Strategies include surgery followed by adjuvant chemotherapy  neoadjuvant chemotherapy or chemoimmunotherapy followed by surgery, neoadjuvant chemoradiotherapy followed by surgery or concurrent or sequential chemotherapy with definitive radiation therapy.      The accepted standard remains concurrent chemoradiotherapy although this is being challenged     The potential benefit of adding surgery to combined chemoradiotherapy for stage IIIA disease was noted in the Intergroup 0139 trial.  T1-3N2 NSCLC were assigned to concurrent chemorads 45 Gy with two cycles of Cisplatin/Etoposide followed by either surgery of continued chemorads.  PFS was longer in the surgery arm with no OS difference.     "  The two chemotherapy regimens that have been most commonly used in the [place] are the combination of cisplatin and etoposide and weekly carboplatin and paclitaxel:    Concurrent cisplatin (50 mg/m2 on days 1, 8, 29, and 36) plus etoposide (50 mg/m2 daily on days 1 to 5, and 29 to 33) with thoracic RT followed by two cycles of cisplatin plus etoposide was evaluated in a multicenter phase II trial of 50 patients with pathologically confirmed stage IIIB disease  With an average follow-up of 52 months, the three- and five-year survival rates were 17 and 15 percent, respectively. Treatment was complicated by grade 4 neutropenia in 32 percent and grade 3 or 4 esophagitis in 20 percent of patients. In a subsequent phase II trial, better results were seen with the same concurrent chemoradiation regimen followed by docetaxel consolidation, but the use of consolidation chemotherapy in this setting has been called into question by subsequent data      In a randomized phase II trial, carboplatin (area under the curve [AUC] = 2) and paclitaxel (45 mg/m2) given weekly with radiotherapy (63 Gy) followed by two cycles of consolidation therapy (carboplatin AUC = 6, paclitaxel 200 mg/m2) resulted in a median survival of 16.3 months.       Thus, for chemoradiation, the choices are cisplatin plus etoposide, in conjunction with once daily RT to a total of 60 Gy. An alternative \"radiosensitizing chemotherapy\" approach uses weekly carboplatin plus paclitaxel with approximately 60 Gy of radiation, followed by two cycles of consolidation with this same chemotherapy combination at standard systemic doses. The combination of pemetrexed and platinum agents has emerged as another acceptable alternative for stage III patients with nonsquamous cell histology.      Based on preclinical evidence demonstrating synergy between RT and IO, the Phase III [facility] trial used a PDL1 inhibitor Durvalumab after concurrent CRT in a randomized study " involving 713 patients.  After median follow up of 34 months giving drug every 2 weeks for 12 months as consolidation, the use of durvalumab significantly improved PFR from 5.6 to 16.9 months and led to a significant improvement in OS with HR 0.72.  The incidence of new mets was also lower including brain mets.      Grade 3 or 4 adverse events occurred in 31 percent of the patients who received durvalumab and 26 percent of those who received placebo, with the most common severe adverse event being pneumonia. Adverse events of any grade that were of special interest, regardless of cause, were reported in 66 percent of patients receiving durvalumab versus 49 percent of those receiving placebo, most of which were grade 1 or 2. Such events for durvalumab versus placebo included diarrhea (18 versus 19 percent), pneumonitis (13 versus 8 percent), rash (12 versus 7 percent), and pruritus (12 versus 5 percent). Adverse events of special interest for which patients received concomitant treatment were reported in 42 versus 17 of patients receiving durvalumab and placebo, respectively, with treatments typically including steroids, endocrine agents, and occasionally other immunosuppressive agents. Patient-reported quality of life was comparable between the two groups     These results demonstrate efficacy and tolerability of durvalumab for treatment of unresectable stage III NSCLC in patients who experience an objective response or stable disease following completion of chemoradiotherapy.    For this patient weekly Carboplatin AUC 2 and Taxol 45 mg/m2 weekly x 6 weeks with RT to 60-63 Gy will be used.  IO therapy, based on response, will be considered per the [facility] trial as above       Assessment/Plan:     Ms. 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  She has been tolerating Hydrea 500 mg once daily Monday through Friday and off on Saturday and  Sunday. Patient also with 4.1 cm RUL mass 1.5cm spiculated posterior nodule mediastinal adenopathy, no metastatic disease including brain MRI new diagnosis      ET:      - hydroxyurea (HYDREA) 500 mg capsule; continue daily 500 mg 7 days per week; once treatment starts will hold         Plts 501 4/5/2024; 3/12/2024 were 4949; 2/16/2024 417; 12/31/23 were 361; 10/15/2023 were 366     Thus hydrea was increased to daily 500 mg seven days per week.       NSCLC/adenocarcinoma;mutational analysis pending      Now with RUL 4.2 cm mass with mediastinal adenopathy at least cT2a cN2 cM0 lung cancer     cT2b N3 Stage IIIB lung      Please see above regarding discussion     4/29/2024    Based on staging, will start CRT    Carboplatin AUC 2 Taxol 45 mg/m2 weekly with RT    Durvalumab adjuvant post treatment depending on response       IgG deficiency     IgG mildly low at 571.  This is not significantly decreased enough to cause the recurrent infections.  Will continue to monitor on subsequent labs.  No need for intervention at this time.     - Comprehensive metabolic panel; Future  - IgG, IgA, IgM; Future      Follow up     2-3  weeks                   History of present illness:  Initial Visit 4/10/2024     Ayla Nye is a 73-year-old female with past medical history of hypertension, psoriatic arthritis, CKD stage III, hyperlipidemia, and latent TB.  She is seen in follow-up for essential thrombocytosis.  She has had an elevated platelet count dating back to January 2028 all of her other cell lines were within normal limits.  Her highest platelet count was around 700,000.  Myeloproliferative work-up demonstrated positive JAK2 mutation and she was started on 500 mg of Hydrea daily in July 2020.  Due to leukopenia her dose was reduced to Monday, Wednesday, Friday.  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     In March 2023 her dose was increased back to once daily  due to increased platelet count and no longer working at the school.  Then again in July we had to decrease her dose and she is taking 500 mg once daily Monday through Friday and not taking any on Saturday or Sunday.     She has been tolerating the 500 mg of Hydrea Monday through Friday off Saturday and Sunday well without any side effects.  She was seen by my attending and underwent further workup of her recurrent infections and was found to have a mildly low IgG level.   She has had pneumonia twice once in October and then again in February.  She states that she has had multiple imaging of her chest which does suggest scarring which prompted a CT scan of her chest .  .  She is also had recurrent sinus infections.  She is a former smoker and quit 15 years ago bit was 1[[d x more than 30 years.        She does not have any continued symptoms of pneumonia and no cough, shortness of breath, or fevers.       She underwent the following testing; EBUS with pulmonary as well as CT chest/PET scan     Lung mass on CT chest 2/28/2024     LUNGS:     -4.1 x 3.4 cm solid mass in the anterior right upper lobe (series 302, image 73).     -Posterior to this mass, there is a 1.5 x 1.5 cm spiculated nodule (series 302, image 78)     -2.5 x 2.1 cm groundglass nodule in the anterior left upper lobe (series 302, image 90)     Mild emphysema.     Right apical pleural-parenchymal scarring.     PLEURA: Small calcified left posterior pleural plaque, which may be from prior asbestos exposure or the sequela of old inflammation.     HEART/GREAT VESSELS: Normal heart size. Mild-moderate coronary artery calcification. Mild to moderate mitral annular calcification. Trace pericardial effusion. No thoracic aortic aneurysm.     MEDIASTINUM AND SUDEEP: 1.6 x 2.0 cm right lower paratracheal/precarinal lymph node. 1.3 x 1.3 cm right lower paratracheal lymph node.        CT PET  3/28/2024     Intensely FDG avid right upper lobe mass, SUV max of 18. This  abuts the anterior pleural margin. Central photopenia suggest necrosis. Mass measures on the order of 4.2 cm in size, stable previously 4.1 cm.     Subjacent 1.5 cm spiculated nodule posteriorly demonstrates minimal uptake, SUV max of 1.7.     Minimal FDG uptake in the left upper lobe groundglass nodule, SUV max of 2.2. This measured up to 2.5 cm in size on recent CT, appears grossly stable on low-dose CT.     A few FDG-avid mediastinal lymph nodes. Right anterior paratracheal lymph node demonstrates SUV max of 13. This measures up to 1.9 cm short axis image 85 series 3, may be slightly larger previously 1.6 cm.     A right perihilar lymph node demonstrates SUV max of 12. This measures on the order of 1.2 cm short axis image 89 series 3.  CT images: Scattered emphysematous changes of the lung fields. Mild to moderate coronary artery calcifications. Minimal left pleural calcification. Small calcified lymph nodes in the left perihilar region        3/26/2024     -C. Lymph Node, Level 4R (ThinPrep, smears and cell block preparations):    - Metastatic non-small cell carcinoma, most compatible with a primary lung adenocarcinoma; see note.    - Satisfactory for evaluation.     D-F. Lymph Node, Level 10R (ThinPrep, smears and cell block preparations):    - Metastatic non-small cell carcinoma; see note.    - Satisfactory for evaluation.     G-I. Lymph Node, Level 4L (ThinPrep, smears and cell block preparations):    - Metastatic non-small cell carcinoma; see note.    - Satisfactory for evaluation.               Review of Systems   Constitutional:  Positive for fatigue. Negative for activity change, appetite change, diaphoresis, fever and unexpected weight change.   HENT:  Negative for trouble swallowing and voice change.    Eyes:  Negative for photophobia and visual disturbance.   Respiratory:  Negative for cough, chest tightness and shortness of breath.    Cardiovascular:  Negative for chest pain, palpitations and leg  swelling.   Gastrointestinal:  Negative for abdominal distention, abdominal pain, anal bleeding, blood in stool, constipation, diarrhea, nausea and vomiting.   Endocrine: Negative for cold intolerance and heat intolerance.   Genitourinary:  Negative for dysuria, hematuria and urgency.   Musculoskeletal:  Negative for arthralgias, back pain, gait problem, joint swelling and myalgias.   Skin:  Negative for pallor and rash.   Neurological:  Negative for dizziness, weakness, light-headedness and headaches.   Hematological:  Negative for adenopathy. Does not bruise/bleed easily.   Psychiatric/Behavioral:  Negative for confusion and sleep disturbance.                 Interval History:  4/29/2024    Ms Nye is here for follow up and to discuss treatment.  Patient with IIIB adenocarcinoma of the lung; will treat with CRT    Weekly Carboplatin/Taxol were discussed with radiation; side effects were discussed in detail, informed consent was obtained    Did discuss RT effects and the combined effects noted with all agents.     She will remain on hydrea for ET until starts treatment    Did ask questions regarding nutrition; did discuss THC products as well as nutrition supplements      Her ROS is unchanged       REVIEW OF SYSTEMS:  Please note that a 14-point review of systems was performed to include Constitutional, HEENT, Respiratory, CVS, GI, , Musculoskeletal, Integumentary, Neurologic, Rheumatologic, Endocrinologic, Psychiatric, Lymphatic, and Hematologic/Oncologic systems were reviewed and are negative unless otherwise stated in HPI. Positive and negative findings pertinent to this evaluation are incorporated into the history of present illness.      ECOG PS:   0-1  PROBLEM LIST:  Patient Active Problem List   Diagnosis    TB lung, latent    Psoriatic arthritis (HCC)    Essential thrombocytosis (HCC)    HTN (hypertension), benign    Mixed hyperlipidemia    Encounter for monitoring of hydroxyurea therapy    JAK2 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    Age-related osteoporosis without current pathological fracture    Stage 3b chronic kidney disease (CKD) (HCC)    Malignant neoplasm of upper lobe, right bronchus or lung (HCC)    Bilateral leg pain    Insomnia       Past Medical History:   has a past medical history of Allergic (2022), Essential thrombocytosis (HCC), Hyperlipidemia, Hypertension, Mass in chest, Nodular goiter, and Psoriasis.    PAST SURGICAL HISTORY:   has a past surgical history that includes Appendectomy; Mammo needle localization right (all inc) (Right, 07/13/2009); Skin lesion excision; Colonoscopy (10/31/2018); Mammo (historical); DXA procedure(historical) (04/21/2017); and Breast cyst excision (Right, 2009).    CURRENT MEDICATIONS  Current Outpatient Medications   Medication Sig Dispense Refill    amLODIPine (NORVASC) 5 mg tablet Take 1 tablet (5 mg total) by mouth 2 (two) times a day 180 tablet 1    Apremilast 30 MG TABS 30 mg Every 12 hours       aspirin 81 MG tablet Take 81 mg by mouth daily       Cholecalciferol (VITAMIN D3) 5000 units TABS 5,000 Units Daily       diphenhydrAMINE (BENADRYL) 25 mg capsule Take 25 mg by mouth every 12 (twelve) hours as needed for itching       Fluocinolone Acetonide Scalp 0.01 % OIL       halobetasol (ULTRAVATE) 0.05 % ointment        hydrocortisone 2.5 % ointment       hydroxyurea (HYDREA) 500 mg capsule Take 1 capsule (500 mg total) by mouth daily 90 capsule 3    losartan (COZAAR) 50 mg tablet Take 1 tablet (50 mg total) by mouth daily 90 tablet 1    Probiotic Product (PROBIOTIC DAILY PO) Take 1 capsule by mouth daily      vitamin B-12 (VITAMIN B-12) 1,000 mcg tablet Take 1 tablet (1,000 mcg total) by mouth daily 90 tablet 1     No current facility-administered medications for this visit.     [unfilled]    SOCIAL HISTORY:   reports that she has quit smoking. Her smoking use included cigarettes. She started smoking about 58 years ago. She has a 58.3 pack-year smoking history. She has never used  smokeless tobacco. She reports that she does not currently use alcohol. She reports that she does not use drugs.     FAMILY HISTORY:  family history includes Cancer in her brother and brother; Coronary artery disease in her brother; Depression in her mother; Hearing loss in her mother; Hypertension in her brother and mother; No Known Problems in her daughter, daughter, maternal aunt, maternal aunt, maternal aunt, maternal aunt, maternal grandfather, maternal grandmother, paternal aunt, paternal grandfather, and paternal grandmother; Stroke in her mother; Tuberculosis in her brother and father.     ALLERGIES:  is allergic to doxycycline, keflex [cephalexin], and neomycin-polymyxin-dexameth.      Physical Exam:  Vital Signs:   Visit Vitals  OB Status Postmenopausal   Smoking Status Former     There is no height or weight on file to calculate BMI.  There is no height or weight on file to calculate BSA.    GEN: Alert, awake oriented x3, in no acute distress  HEENT- No pallor, icterus, cyanosis, no oral mucosal lesions,   LAD - no palpable cervical, clavicle, axillary, inguinal LAD  Heart- normal S1 S2, regular rate and rhythm, No murmur, rubs.   Lungs- clear breathing sound bilateral.   Abdomen- soft, Non tender, bowel sounds present  Extremities- No cyanosis, clubbing, edema  Neuro- No focal neurological deficit    Labs:  Lab Results   Component Value Date    WBC 6.69 04/05/2024    HGB 11.2 (L) 04/05/2024    HCT 34.5 (L) 04/05/2024     (H) 04/05/2024     (H) 04/05/2024     Lab Results   Component Value Date    SODIUM 139 03/12/2024    K 4.1 03/12/2024     03/12/2024    CO2 27 03/12/2024    AGAP 8 03/12/2024    BUN 14 03/12/2024    CREATININE 0.98 03/12/2024    GLUC 101 03/12/2024    GLUF 86 07/31/2023    CALCIUM 9.6 03/12/2024    AST 11 (L) 03/12/2024    ALT 6 (L) 03/12/2024    ALKPHOS 47 03/12/2024    TP 7.2 03/12/2024    TBILI 0.40 03/12/2024    EGFR 57 03/12/2024           I spent 45 minutes on  chart review, face to face counseling time, coordination of care and documentation.    Rosalinda Castro MD PhD

## 2024-04-29 ENCOUNTER — OFFICE VISIT (OUTPATIENT)
Dept: HEMATOLOGY ONCOLOGY | Facility: CLINIC | Age: 74
End: 2024-04-29
Payer: MEDICARE

## 2024-04-29 VITALS
RESPIRATION RATE: 16 BRPM | HEART RATE: 68 BPM | BODY MASS INDEX: 20.43 KG/M2 | HEIGHT: 62 IN | OXYGEN SATURATION: 99 % | DIASTOLIC BLOOD PRESSURE: 70 MMHG | TEMPERATURE: 97.8 F | WEIGHT: 111 LBS | SYSTOLIC BLOOD PRESSURE: 112 MMHG

## 2024-04-29 DIAGNOSIS — C34.11 MALIGNANT NEOPLASM OF UPPER LOBE, RIGHT BRONCHUS OR LUNG (HCC): Primary | ICD-10-CM

## 2024-04-29 DIAGNOSIS — E44.0 MODERATE PROTEIN-CALORIE MALNUTRITION (HCC): ICD-10-CM

## 2024-04-29 DIAGNOSIS — C34.91 NSCLC OF RIGHT LUNG (HCC): Primary | ICD-10-CM

## 2024-04-29 PROCEDURE — 99215 OFFICE O/P EST HI 40 MIN: CPT | Performed by: INTERNAL MEDICINE

## 2024-04-29 RX ORDER — SODIUM CHLORIDE 9 MG/ML
20 INJECTION, SOLUTION INTRAVENOUS ONCE
OUTPATIENT
Start: 2024-05-23

## 2024-04-30 ENCOUNTER — APPOINTMENT (OUTPATIENT)
Dept: LAB | Facility: HOSPITAL | Age: 74
End: 2024-04-30
Payer: MEDICARE

## 2024-04-30 DIAGNOSIS — T45.1X5A CHEMOTHERAPY INDUCED NAUSEA AND VOMITING: ICD-10-CM

## 2024-04-30 DIAGNOSIS — E86.0 DEHYDRATION: ICD-10-CM

## 2024-04-30 DIAGNOSIS — C34.11 MALIGNANT NEOPLASM OF UPPER LOBE, RIGHT BRONCHUS OR LUNG (HCC): Primary | ICD-10-CM

## 2024-04-30 DIAGNOSIS — R11.2 CHEMOTHERAPY INDUCED NAUSEA AND VOMITING: ICD-10-CM

## 2024-04-30 DIAGNOSIS — C34.11 MALIGNANT NEOPLASM OF UPPER LOBE, RIGHT BRONCHUS OR LUNG (HCC): ICD-10-CM

## 2024-04-30 LAB
LDH SERPL-CCNC: 226 U/L (ref 140–271)
MAGNESIUM SERPL-MCNC: 2.2 MG/DL (ref 1.9–2.7)
T3FREE SERPL-MCNC: 3.38 PG/ML (ref 2.5–3.9)
TSH SERPL DL<=0.05 MIU/L-ACNC: 2.93 UIU/ML (ref 0.45–4.5)

## 2024-04-30 PROCEDURE — 83615 LACTATE (LD) (LDH) ENZYME: CPT

## 2024-04-30 PROCEDURE — 84443 ASSAY THYROID STIM HORMONE: CPT

## 2024-04-30 PROCEDURE — 84481 FREE ASSAY (FT-3): CPT

## 2024-04-30 PROCEDURE — 80053 COMPREHEN METABOLIC PANEL: CPT

## 2024-04-30 PROCEDURE — 83735 ASSAY OF MAGNESIUM: CPT

## 2024-04-30 PROCEDURE — 36415 COLL VENOUS BLD VENIPUNCTURE: CPT

## 2024-04-30 RX ORDER — PROCHLORPERAZINE MALEATE 10 MG
10 TABLET ORAL EVERY 6 HOURS PRN
Qty: 30 TABLET | Refills: 1 | Status: SHIPPED | OUTPATIENT
Start: 2024-04-30

## 2024-04-30 RX ORDER — ONDANSETRON HYDROCHLORIDE 8 MG/1
8 TABLET, FILM COATED ORAL EVERY 8 HOURS PRN
Qty: 30 TABLET | Refills: 1 | Status: SHIPPED | OUTPATIENT
Start: 2024-04-30

## 2024-05-06 ENCOUNTER — TELEPHONE (OUTPATIENT)
Dept: HEMATOLOGY ONCOLOGY | Facility: CLINIC | Age: 74
End: 2024-05-06

## 2024-05-06 NOTE — TELEPHONE ENCOUNTER
Patient Call    Who are you speaking with? Patient    If it is not the patient, are they listed on an active communication consent form? N/A   What is the reason for this call? Patient calling to speak with Bailey Glez in regards to blood work that was scheduled.  Patient has some questions about the lab work.  Patient would like a call back to discuss.    Does this require a call back? Yes   If a call back is required, please list [facility] call back number [phone]   If a call back is required, advise that a message will be forwarded to their care team and someone will return their call as soon as possible.   Did you relay this information to the patient? Yes

## 2024-05-06 NOTE — TELEPHONE ENCOUNTER
Needs scheduling for chemo/radiation for 6 weeks. Preferably at [facility] were she will be getting radiation as well. Tentative start on 5/20.

## 2024-05-06 NOTE — TELEPHONE ENCOUNTER
Patient calling to clarify when to have her blood work done prior to her first chemo treatment. Patient made aware that chemo/radiation is tentatively scheduled to start on 5/20- may change after simulation date on 5/9. If patient does start on 5/20, she was made aware to have blood work done on 5/17. Patient verbally understood.

## 2024-05-08 LAB
ALBUMIN SERPL BCP-MCNC: 3.7 G/DL (ref 3.5–5)
ALP SERPL-CCNC: 59 U/L (ref 34–104)
ALT SERPL W P-5'-P-CCNC: 8 U/L (ref 7–52)
ANION GAP SERPL CALCULATED.3IONS-SCNC: 10 MMOL/L (ref 4–13)
AST SERPL W P-5'-P-CCNC: 10 U/L (ref 13–39)
BILIRUB SERPL-MCNC: 0.3 MG/DL (ref 0.2–1)
BUN SERPL-MCNC: 20 MG/DL (ref 5–25)
CALCIUM SERPL-MCNC: 9.3 MG/DL (ref 8.4–10.2)
CHLORIDE SERPL-SCNC: 103 MMOL/L (ref 96–108)
CO2 SERPL-SCNC: 27 MMOL/L (ref 21–32)
CREAT SERPL-MCNC: 1.15 MG/DL (ref 0.6–1.3)
GFR SERPL CREATININE-BSD FRML MDRD: 47 ML/MIN/1.73SQ M
GLUCOSE P FAST SERPL-MCNC: 97 MG/DL (ref 65–99)
POTASSIUM SERPL-SCNC: 3.9 MMOL/L (ref 3.5–5.3)
PROT SERPL-MCNC: 6.7 G/DL (ref 6.4–8.4)
SODIUM SERPL-SCNC: 140 MMOL/L (ref 135–147)

## 2024-05-09 ENCOUNTER — RADIATION THERAPY TREATMENT (OUTPATIENT)
Dept: RADIATION ONCOLOGY | Facility: HOSPITAL | Age: 74
End: 2024-05-09
Attending: INTERNAL MEDICINE
Payer: MEDICARE

## 2024-05-09 PROCEDURE — 77334 RADIATION TREATMENT AID(S): CPT | Performed by: INTERNAL MEDICINE

## 2024-05-16 ENCOUNTER — OFFICE VISIT (OUTPATIENT)
Dept: FAMILY MEDICINE CLINIC | Facility: CLINIC | Age: 74
End: 2024-05-16
Payer: MEDICARE

## 2024-05-16 VITALS
TEMPERATURE: 97 F | OXYGEN SATURATION: 99 % | HEIGHT: 62 IN | DIASTOLIC BLOOD PRESSURE: 70 MMHG | SYSTOLIC BLOOD PRESSURE: 110 MMHG | RESPIRATION RATE: 18 BRPM | WEIGHT: 111.8 LBS | BODY MASS INDEX: 20.57 KG/M2 | HEART RATE: 87 BPM

## 2024-05-16 DIAGNOSIS — L40.50 PSORIATIC ARTHRITIS (HCC): ICD-10-CM

## 2024-05-16 DIAGNOSIS — I10 HTN (HYPERTENSION), BENIGN: Primary | ICD-10-CM

## 2024-05-16 DIAGNOSIS — E78.2 MIXED HYPERLIPIDEMIA: ICD-10-CM

## 2024-05-16 DIAGNOSIS — N18.32 STAGE 3B CHRONIC KIDNEY DISEASE (CKD) (HCC): ICD-10-CM

## 2024-05-16 PROCEDURE — G2211 COMPLEX E/M VISIT ADD ON: HCPCS

## 2024-05-16 PROCEDURE — 99213 OFFICE O/P EST LOW 20 MIN: CPT

## 2024-05-16 RX ORDER — AMLODIPINE BESYLATE 5 MG/1
5 TABLET ORAL DAILY
COMMUNITY

## 2024-05-16 NOTE — PROGRESS NOTES
Assessment/Plan:         Problem List Items Addressed This Visit     Psoriatic arthritis (HCC)     Under control.    Continue current medication.    We will re-evaluate at next office visit.  Patient has been followed by a dermatologist and rheumatologist         HTN (hypertension), benign - Primary     Under control.  Continue amlodipine and losartan.  We will continue to monitor         Relevant Medications    amLODIPine (NORVASC) 5 mg tablet    Mixed hyperlipidemia     Under control with diet and exercise.  We will continue to monitor         Stage 3b chronic kidney disease (CKD) (HCC)     Lab Results   Component Value Date    EGFR 47 04/30/2024    EGFR 57 03/12/2024    EGFR 35 08/31/2023    CREATININE 1.15 04/30/2024    CREATININE 0.98 03/12/2024    CREATININE 1.48 (H) 08/31/2023   creatinine and GFR stable   Will need periodic BMP  Avoid NSAIDs like ibuprofen Aleve Advil etc.  Avoid high potassium diet.  We will continue to monitor                 Subjective:      Patient ID: Ayla Nye is a 73 y.o. female.    Patient here for review of chronic medical problems and  the labs and imaging if it is applicable.  Currently has no specific complaints other than mentioned in the review of systems  Denies chest pain, SOB, cough, abdominal pain, nausea, vomiting, fever, chills, lightheadedness, dizziness,headache, tingling or numbness.No bowel or bladder problem.          The following portions of the patient's history were reviewed and updated as appropriate:   Past Medical History:  She has a past medical history of Allergic (2022), Essential thrombocytosis (HCC), Hyperlipidemia, Hypertension, Mass in chest, Nodular goiter, and Psoriasis.,  _______________________________________________________________________  Medical Problems:  does not have any pertinent problems on file.,  _______________________________________________________________________  Past Surgical History:   has a past surgical history that  includes Appendectomy; Mammo needle localization right (all inc) (Right, 07/13/2009); Skin lesion excision; Colonoscopy (10/31/2018); Mammo (historical); DXA procedure(historical) (04/21/2017); and Breast cyst excision (Right, 2009).,  _______________________________________________________________________  Family History:  family history includes Cancer in her brother and brother; Coronary artery disease in her brother; Depression in her mother; Hearing loss in her mother; Hypertension in her brother and mother; No Known Problems in her daughter, daughter, maternal aunt, maternal aunt, maternal aunt, maternal aunt, maternal grandfather, maternal grandmother, paternal aunt, paternal grandfather, and paternal grandmother; Stroke in her mother; Tuberculosis in her brother and father.,  _______________________________________________________________________  Social History:   reports that she has quit smoking. Her smoking use included cigarettes. She started smoking about 58 years ago. She has a 58.4 pack-year smoking history. She has never used smokeless tobacco. She reports that she does not currently use alcohol. She reports that she does not use drugs.,  _______________________________________________________________________  Allergies:  is allergic to doxycycline, keflex [cephalexin], and neomycin-polymyxin-dexameth..  _______________________________________________________________________  Current Outpatient Medications   Medication Sig Dispense Refill   • amLODIPine (NORVASC) 5 mg tablet Take 5 mg by mouth daily     • aspirin 81 MG tablet Take 81 mg by mouth daily      • Cholecalciferol (VITAMIN D3) 5000 units TABS 5,000 Units Daily      • diphenhydrAMINE (BENADRYL) 25 mg capsule Take 25 mg by mouth every 12 (twelve) hours as needed for itching      • Fluocinolone Acetonide Scalp 0.01 % OIL      • halobetasol (ULTRAVATE) 0.05 % ointment       • hydrocortisone 2.5 % ointment      • hydroxyurea (HYDREA) 500 mg  capsule Take 1 capsule (500 mg total) by mouth daily (Patient taking differently: Take 500 mg by mouth daily On hold) 90 capsule 3   • losartan (COZAAR) 50 mg tablet Take 1 tablet (50 mg total) by mouth daily 90 tablet 1   • ondansetron (ZOFRAN) 8 mg tablet Take 1 tablet (8 mg total) by mouth every 8 (eight) hours as needed for nausea or vomiting 30 tablet 1   • Probiotic Product (PROBIOTIC DAILY PO) Take 1 capsule by mouth daily     • prochlorperazine (COMPAZINE) 10 mg tablet Take 1 tablet (10 mg total) by mouth every 6 (six) hours as needed for nausea or vomiting 30 tablet 1   • vitamin B-12 (VITAMIN B-12) 1,000 mcg tablet Take 1 tablet (1,000 mcg total) by mouth daily 90 tablet 1     No current facility-administered medications for this visit.     _______________________________________________________________________  Review of Systems   Constitutional:  Positive for fatigue. Negative for chills and fever.   HENT:  Negative for congestion, ear pain, rhinorrhea, sneezing and sore throat.    Eyes:  Negative for redness, itching and visual disturbance.   Respiratory:  Negative for cough, chest tightness and shortness of breath.    Cardiovascular:  Negative for chest pain, palpitations and leg swelling.   Gastrointestinal:  Negative for abdominal pain, blood in stool, diarrhea, nausea and vomiting.   Endocrine: Negative for cold intolerance and heat intolerance.   Genitourinary:  Negative for dysuria, frequency and urgency.   Musculoskeletal:  Negative for arthralgias, back pain and myalgias.   Skin:  Negative for color change and rash.   Neurological:  Negative for dizziness, weakness, light-headedness, numbness and headaches.   Hematological:  Does not bruise/bleed easily.   Psychiatric/Behavioral:  Negative for agitation, behavioral problems and confusion.          Objective:  Vitals:    05/16/24 0908   BP: 110/70   BP Location: Left arm   Patient Position: Sitting   Cuff Size: Standard   Pulse: 87   Resp: 18  "  Temp: (!) 97 °F (36.1 °C)   TempSrc: Temporal   SpO2: 99%   Weight: 50.7 kg (111 lb 12.8 oz)   Height: 5' 2\" (1.575 m)     Body mass index is 20.45 kg/m².     Physical Exam  Vitals and nursing note reviewed.   Constitutional:       General: She is not in acute distress.     Appearance: Normal appearance. She is not ill-appearing, toxic-appearing or diaphoretic.   HENT:      Head: Normocephalic and atraumatic.      Nose: Nose normal. No congestion.      Mouth/Throat:      Mouth: Mucous membranes are moist.   Eyes:      General: No scleral icterus.        Right eye: No discharge.         Left eye: No discharge.      Extraocular Movements: Extraocular movements intact.      Conjunctiva/sclera: Conjunctivae normal.      Pupils: Pupils are equal, round, and reactive to light.   Cardiovascular:      Rate and Rhythm: Normal rate and regular rhythm.      Pulses: Normal pulses.      Heart sounds: Normal heart sounds. No murmur heard.     No gallop.   Pulmonary:      Effort: Pulmonary effort is normal. No respiratory distress.      Breath sounds: Normal breath sounds. No wheezing, rhonchi or rales.   Abdominal:      General: Abdomen is flat. Bowel sounds are normal. There is no distension.      Palpations: Abdomen is soft.      Tenderness: There is no abdominal tenderness. There is no guarding.   Musculoskeletal:         General: No swelling or tenderness. Normal range of motion.      Cervical back: Normal range of motion and neck supple. No rigidity.      Right lower leg: No edema.      Left lower leg: No edema.   Lymphadenopathy:      Cervical: No cervical adenopathy.   Skin:     General: Skin is warm.      Capillary Refill: Capillary refill takes 2 to 3 seconds.      Coloration: Skin is not jaundiced.      Findings: No bruising or rash.   Neurological:      General: No focal deficit present.      Mental Status: She is alert and oriented to person, place, and time. Mental status is at baseline.      Gait: Gait normal. "   Psychiatric:         Mood and Affect: Mood normal.

## 2024-05-16 NOTE — ASSESSMENT & PLAN NOTE
Lab Results   Component Value Date    EGFR 47 04/30/2024    EGFR 57 03/12/2024    EGFR 35 08/31/2023    CREATININE 1.15 04/30/2024    CREATININE 0.98 03/12/2024    CREATININE 1.48 (H) 08/31/2023   creatinine and GFR stable   Will need periodic BMP  Avoid NSAIDs like ibuprofen Aleve Advil etc.  Avoid high potassium diet.  We will continue to monitor

## 2024-05-20 ENCOUNTER — APPOINTMENT (OUTPATIENT)
Dept: LAB | Facility: CLINIC | Age: 74
End: 2024-05-20
Payer: MEDICARE

## 2024-05-20 ENCOUNTER — TELEPHONE (OUTPATIENT)
Dept: HEMATOLOGY ONCOLOGY | Facility: CLINIC | Age: 74
End: 2024-05-20

## 2024-05-20 DIAGNOSIS — C34.91 NSCLC OF RIGHT LUNG (HCC): ICD-10-CM

## 2024-05-20 DIAGNOSIS — C34.11 MALIGNANT NEOPLASM OF UPPER LOBE, RIGHT BRONCHUS OR LUNG (HCC): Primary | ICD-10-CM

## 2024-05-20 LAB
ALBUMIN SERPL BCP-MCNC: 3.4 G/DL (ref 3.5–5)
ALP SERPL-CCNC: 65 U/L (ref 34–104)
ALT SERPL W P-5'-P-CCNC: 5 U/L (ref 7–52)
ANION GAP SERPL CALCULATED.3IONS-SCNC: 8 MMOL/L (ref 4–13)
AST SERPL W P-5'-P-CCNC: 7 U/L (ref 13–39)
BASOPHILS # BLD AUTO: 0.04 THOUSANDS/ÂΜL (ref 0–0.1)
BASOPHILS NFR BLD AUTO: 1 % (ref 0–1)
BILIRUB SERPL-MCNC: 0.26 MG/DL (ref 0.2–1)
BUN SERPL-MCNC: 16 MG/DL (ref 5–25)
CALCIUM ALBUM COR SERPL-MCNC: 9.1 MG/DL (ref 8.3–10.1)
CALCIUM SERPL-MCNC: 8.6 MG/DL (ref 8.4–10.2)
CHLORIDE SERPL-SCNC: 102 MMOL/L (ref 96–108)
CO2 SERPL-SCNC: 28 MMOL/L (ref 21–32)
CREAT SERPL-MCNC: 0.99 MG/DL (ref 0.6–1.3)
EOSINOPHIL # BLD AUTO: 0.17 THOUSAND/ÂΜL (ref 0–0.61)
EOSINOPHIL NFR BLD AUTO: 2 % (ref 0–6)
ERYTHROCYTE [DISTWIDTH] IN BLOOD BY AUTOMATED COUNT: 14.4 % (ref 11.6–15.1)
GFR SERPL CREATININE-BSD FRML MDRD: 56 ML/MIN/1.73SQ M
GLUCOSE SERPL-MCNC: 149 MG/DL (ref 65–140)
HCT VFR BLD AUTO: 30 % (ref 34.8–46.1)
HGB BLD-MCNC: 9.3 G/DL (ref 11.5–15.4)
IMM GRANULOCYTES # BLD AUTO: 0.04 THOUSAND/UL (ref 0–0.2)
IMM GRANULOCYTES NFR BLD AUTO: 1 % (ref 0–2)
LDH SERPL-CCNC: 221 U/L (ref 140–271)
LYMPHOCYTES # BLD AUTO: 0.88 THOUSANDS/ÂΜL (ref 0.6–4.47)
LYMPHOCYTES NFR BLD AUTO: 12 % (ref 14–44)
MAGNESIUM SERPL-MCNC: 2 MG/DL (ref 1.9–2.7)
MCH RBC QN AUTO: 31.8 PG (ref 26.8–34.3)
MCHC RBC AUTO-ENTMCNC: 31 G/DL (ref 31.4–37.4)
MCV RBC AUTO: 103 FL (ref 82–98)
MONOCYTES # BLD AUTO: 0.59 THOUSAND/ÂΜL (ref 0.17–1.22)
MONOCYTES NFR BLD AUTO: 8 % (ref 4–12)
NEUTROPHILS # BLD AUTO: 5.87 THOUSANDS/ÂΜL (ref 1.85–7.62)
NEUTS SEG NFR BLD AUTO: 76 % (ref 43–75)
NRBC BLD AUTO-RTO: 0 /100 WBCS
PLATELET # BLD AUTO: 527 THOUSANDS/UL (ref 149–390)
PMV BLD AUTO: 8.5 FL (ref 8.9–12.7)
POTASSIUM SERPL-SCNC: 3.4 MMOL/L (ref 3.5–5.3)
PROT SERPL-MCNC: 6.3 G/DL (ref 6.4–8.4)
RBC # BLD AUTO: 2.92 MILLION/UL (ref 3.81–5.12)
SODIUM SERPL-SCNC: 138 MMOL/L (ref 135–147)
WBC # BLD AUTO: 7.59 THOUSAND/UL (ref 4.31–10.16)

## 2024-05-20 PROCEDURE — 80053 COMPREHEN METABOLIC PANEL: CPT

## 2024-05-20 PROCEDURE — 83615 LACTATE (LD) (LDH) ENZYME: CPT

## 2024-05-20 PROCEDURE — 36415 COLL VENOUS BLD VENIPUNCTURE: CPT

## 2024-05-20 PROCEDURE — 83735 ASSAY OF MAGNESIUM: CPT

## 2024-05-20 PROCEDURE — 85025 COMPLETE CBC W/AUTO DIFF WBC: CPT

## 2024-05-20 NOTE — TELEPHONE ENCOUNTER
Lab Inquiry   Who are you speaking with? Patient     If it is not the patient, are they listed on an active communication consent form? Yes   Name of ordering provider Dr Rosalinda Castro   What is being requested? Lab orders need to be entered   Lab draw location [facility]   What is the best call back number? [phone] please call back   If patient at the lab, Was a live attempts to contact the team made? For treatment 5/23

## 2024-05-20 NOTE — TELEPHONE ENCOUNTER
Patient called back to inform her that new lab orders were placed for her treatment starting on 5/23. Pt is aware to get them drawn prior to her appointment.

## 2024-05-21 ENCOUNTER — TELEPHONE (OUTPATIENT)
Dept: INFUSION CENTER | Facility: HOSPITAL | Age: 74
End: 2024-05-21

## 2024-05-21 PROCEDURE — 77338 DESIGN MLC DEVICE FOR IMRT: CPT | Performed by: INTERNAL MEDICINE

## 2024-05-21 PROCEDURE — 77300 RADIATION THERAPY DOSE PLAN: CPT | Performed by: INTERNAL MEDICINE

## 2024-05-21 PROCEDURE — 77293 RESPIRATOR MOTION MGMT SIMUL: CPT | Performed by: INTERNAL MEDICINE

## 2024-05-21 PROCEDURE — 77301 RADIOTHERAPY DOSE PLAN IMRT: CPT | Performed by: INTERNAL MEDICINE

## 2024-05-21 NOTE — TELEPHONE ENCOUNTER
New pt phone call completed. Pt aware of apt location, date, and time. All questions answered at this time.

## 2024-05-23 ENCOUNTER — APPOINTMENT (OUTPATIENT)
Dept: RADIATION ONCOLOGY | Facility: HOSPITAL | Age: 74
End: 2024-05-23
Attending: INTERNAL MEDICINE
Payer: MEDICARE

## 2024-05-23 ENCOUNTER — APPOINTMENT (OUTPATIENT)
Dept: RADIATION ONCOLOGY | Facility: HOSPITAL | Age: 74
End: 2024-05-23
Payer: MEDICARE

## 2024-05-23 ENCOUNTER — HOSPITAL ENCOUNTER (OUTPATIENT)
Dept: INFUSION CENTER | Facility: HOSPITAL | Age: 74
Discharge: HOME | End: 2024-05-23
Attending: INTERNAL MEDICINE
Payer: MEDICARE

## 2024-05-23 VITALS
SYSTOLIC BLOOD PRESSURE: 110 MMHG | HEART RATE: 91 BPM | HEIGHT: 62 IN | RESPIRATION RATE: 18 BRPM | OXYGEN SATURATION: 100 % | WEIGHT: 110.89 LBS | BODY MASS INDEX: 20.41 KG/M2 | TEMPERATURE: 97.6 F | DIASTOLIC BLOOD PRESSURE: 60 MMHG

## 2024-05-23 DIAGNOSIS — C34.11 MALIGNANT NEOPLASM OF UPPER LOBE, RIGHT BRONCHUS OR LUNG (HCC): Primary | ICD-10-CM

## 2024-05-23 DIAGNOSIS — E86.0 DEHYDRATION: ICD-10-CM

## 2024-05-23 PROCEDURE — 96417 CHEMO IV INFUS EACH ADDL SEQ: CPT

## 2024-05-23 PROCEDURE — 96413 CHEMO IV INFUSION 1 HR: CPT

## 2024-05-23 PROCEDURE — 96367 TX/PROPH/DG ADDL SEQ IV INF: CPT

## 2024-05-23 PROCEDURE — 77386 HB NTSTY MODUL RAD TX DLVR CPLX: CPT | Performed by: INTERNAL MEDICINE

## 2024-05-23 PROCEDURE — 96361 HYDRATE IV INFUSION ADD-ON: CPT

## 2024-05-23 PROCEDURE — 77014 CHG CT GUIDANCE RADIATION THERAPY FLDS PLACEMENT: CPT | Performed by: INTERNAL MEDICINE

## 2024-05-23 PROCEDURE — 77427 RADIATION TX MANAGEMENT X5: CPT | Performed by: INTERNAL MEDICINE

## 2024-05-23 RX ORDER — SODIUM CHLORIDE 9 MG/ML
20 INJECTION, SOLUTION INTRAVENOUS ONCE
Status: COMPLETED | OUTPATIENT
Start: 2024-05-23 | End: 2024-05-23

## 2024-05-23 RX ADMIN — SODIUM CHLORIDE 1000 ML: 0.9 INJECTION, SOLUTION INTRAVENOUS at 11:44

## 2024-05-23 RX ADMIN — DEXAMETHASONE SODIUM PHOSPHATE: 10 INJECTION, SOLUTION INTRAMUSCULAR; INTRAVENOUS at 08:41

## 2024-05-23 RX ADMIN — SODIUM CHLORIDE 20 ML/HR: 0.9 INJECTION, SOLUTION INTRAVENOUS at 08:25

## 2024-05-23 RX ADMIN — PACLITAXEL 67.2 MG: 6 INJECTION, SOLUTION, CONCENTRATE INTRAVENOUS at 10:01

## 2024-05-23 RX ADMIN — DIPHENHYDRAMINE HYDROCHLORIDE 25 MG: 50 INJECTION, SOLUTION INTRAMUSCULAR; INTRAVENOUS at 09:03

## 2024-05-23 RX ADMIN — CARBOPLATIN 130.4 MG: 450 INJECTION, SOLUTION INTRAVENOUS at 11:07

## 2024-05-23 RX ADMIN — FAMOTIDINE 20 MG: 10 INJECTION, SOLUTION INTRAVENOUS at 09:24

## 2024-05-23 NOTE — PROGRESS NOTES
Ayla Nye  tolerated d1c1 chemo well with no complications. Aware of future appt on 5/30/24 at 800. AVS declined. Patient left clinic ambulatory.

## 2024-05-24 ENCOUNTER — PATIENT OUTREACH (OUTPATIENT)
Dept: HEMATOLOGY ONCOLOGY | Facility: CLINIC | Age: 74
End: 2024-05-24

## 2024-05-24 ENCOUNTER — APPOINTMENT (OUTPATIENT)
Dept: RADIATION ONCOLOGY | Facility: HOSPITAL | Age: 74
End: 2024-05-24
Attending: INTERNAL MEDICINE
Payer: MEDICARE

## 2024-05-24 PROCEDURE — 77386 HB NTSTY MODUL RAD TX DLVR CPLX: CPT | Performed by: STUDENT IN AN ORGANIZED HEALTH CARE EDUCATION/TRAINING PROGRAM

## 2024-05-24 RX ORDER — SODIUM CHLORIDE 9 MG/ML
20 INJECTION, SOLUTION INTRAVENOUS ONCE
Status: CANCELLED | OUTPATIENT
Start: 2024-05-30

## 2024-05-24 NOTE — PROGRESS NOTES
ASSESSMENT      Are you having any side effects from your treatment?  - Night sweats last night after first TX. Ayla stated she gets this after taking tylenol before bed as well and noticed she was given medication similar to tylenol and believes this may have contributed to the night sweats. I asked if she would like me to address this concern to a nurse. She stated no she is going to see how she does and feels after the second TX and knows to contact Dr. Rayo office if symptoms get worse.    Are you eating and drinking properly?  - Yes pt states her appetite increased     Are you having any pain?  - Not related to diagnosis. Just pain in joints     Have needs changed for a palliative care referral?  - No     Do you know when your upcoming appointments are?  - Yes    Do you have a good support system?  -Yes     Are you interested in any support groups?  -  Not at this time.     Are there any changes in barriers to care?  -no    Do you have any questions or concerns regarding your treatment plan?  - Not at this time Ayla was very appreciative of my call.

## 2024-05-28 ENCOUNTER — APPOINTMENT (OUTPATIENT)
Dept: RADIATION ONCOLOGY | Facility: HOSPITAL | Age: 74
End: 2024-05-28
Attending: INTERNAL MEDICINE
Payer: MEDICARE

## 2024-05-28 ENCOUNTER — APPOINTMENT (OUTPATIENT)
Dept: LAB | Facility: CLINIC | Age: 74
End: 2024-05-28
Payer: MEDICARE

## 2024-05-28 DIAGNOSIS — C34.11 MALIGNANT NEOPLASM OF UPPER LOBE, RIGHT BRONCHUS OR LUNG (HCC): ICD-10-CM

## 2024-05-28 LAB
ALBUMIN SERPL BCP-MCNC: 3.5 G/DL (ref 3.5–5)
ALP SERPL-CCNC: 61 U/L (ref 34–104)
ALT SERPL W P-5'-P-CCNC: 5 U/L (ref 7–52)
ANION GAP SERPL CALCULATED.3IONS-SCNC: 6 MMOL/L (ref 4–13)
AST SERPL W P-5'-P-CCNC: 8 U/L (ref 13–39)
BASOPHILS # BLD AUTO: 0.05 THOUSANDS/ÂΜL (ref 0–0.1)
BASOPHILS NFR BLD AUTO: 1 % (ref 0–1)
BILIRUB SERPL-MCNC: 0.31 MG/DL (ref 0.2–1)
BUN SERPL-MCNC: 16 MG/DL (ref 5–25)
CALCIUM SERPL-MCNC: 8.7 MG/DL (ref 8.4–10.2)
CHLORIDE SERPL-SCNC: 102 MMOL/L (ref 96–108)
CO2 SERPL-SCNC: 28 MMOL/L (ref 21–32)
CREAT SERPL-MCNC: 0.84 MG/DL (ref 0.6–1.3)
EOSINOPHIL # BLD AUTO: 0.13 THOUSAND/ÂΜL (ref 0–0.61)
EOSINOPHIL NFR BLD AUTO: 2 % (ref 0–6)
ERYTHROCYTE [DISTWIDTH] IN BLOOD BY AUTOMATED COUNT: 14.8 % (ref 11.6–15.1)
GFR SERPL CREATININE-BSD FRML MDRD: 69 ML/MIN/1.73SQ M
GLUCOSE P FAST SERPL-MCNC: 95 MG/DL (ref 65–99)
HCT VFR BLD AUTO: 31.3 % (ref 34.8–46.1)
HGB BLD-MCNC: 9.7 G/DL (ref 11.5–15.4)
IMM GRANULOCYTES # BLD AUTO: 0.07 THOUSAND/UL (ref 0–0.2)
IMM GRANULOCYTES NFR BLD AUTO: 1 % (ref 0–2)
LDH SERPL-CCNC: 239 U/L (ref 140–271)
LYMPHOCYTES # BLD AUTO: 0.54 THOUSANDS/ÂΜL (ref 0.6–4.47)
LYMPHOCYTES NFR BLD AUTO: 8 % (ref 14–44)
MAGNESIUM SERPL-MCNC: 2 MG/DL (ref 1.9–2.7)
MCH RBC QN AUTO: 32 PG (ref 26.8–34.3)
MCHC RBC AUTO-ENTMCNC: 31 G/DL (ref 31.4–37.4)
MCV RBC AUTO: 103 FL (ref 82–98)
MONOCYTES # BLD AUTO: 0.49 THOUSAND/ÂΜL (ref 0.17–1.22)
MONOCYTES NFR BLD AUTO: 7 % (ref 4–12)
NEUTROPHILS # BLD AUTO: 5.89 THOUSANDS/ÂΜL (ref 1.85–7.62)
NEUTS SEG NFR BLD AUTO: 81 % (ref 43–75)
NRBC BLD AUTO-RTO: 0 /100 WBCS
PLATELET # BLD AUTO: 498 THOUSANDS/UL (ref 149–390)
PMV BLD AUTO: 8.8 FL (ref 8.9–12.7)
POTASSIUM SERPL-SCNC: 4.3 MMOL/L (ref 3.5–5.3)
PROT SERPL-MCNC: 6.3 G/DL (ref 6.4–8.4)
RBC # BLD AUTO: 3.03 MILLION/UL (ref 3.81–5.12)
SODIUM SERPL-SCNC: 136 MMOL/L (ref 135–147)
WBC # BLD AUTO: 7.17 THOUSAND/UL (ref 4.31–10.16)

## 2024-05-28 PROCEDURE — 77386 HB NTSTY MODUL RAD TX DLVR CPLX: CPT | Performed by: RADIOLOGY

## 2024-05-28 PROCEDURE — 77014 CHG CT GUIDANCE RADIATION THERAPY FLDS PLACEMENT: CPT | Performed by: RADIOLOGY

## 2024-05-28 PROCEDURE — 83615 LACTATE (LD) (LDH) ENZYME: CPT

## 2024-05-28 PROCEDURE — 83735 ASSAY OF MAGNESIUM: CPT

## 2024-05-28 PROCEDURE — 80053 COMPREHEN METABOLIC PANEL: CPT

## 2024-05-28 PROCEDURE — 36415 COLL VENOUS BLD VENIPUNCTURE: CPT

## 2024-05-28 PROCEDURE — 85025 COMPLETE CBC W/AUTO DIFF WBC: CPT

## 2024-05-29 ENCOUNTER — APPOINTMENT (OUTPATIENT)
Dept: RADIATION ONCOLOGY | Facility: HOSPITAL | Age: 74
End: 2024-05-29
Attending: INTERNAL MEDICINE
Payer: MEDICARE

## 2024-05-29 PROCEDURE — 77014 CHG CT GUIDANCE RADIATION THERAPY FLDS PLACEMENT: CPT | Performed by: STUDENT IN AN ORGANIZED HEALTH CARE EDUCATION/TRAINING PROGRAM

## 2024-05-29 PROCEDURE — 77386 HB NTSTY MODUL RAD TX DLVR CPLX: CPT | Performed by: STUDENT IN AN ORGANIZED HEALTH CARE EDUCATION/TRAINING PROGRAM

## 2024-05-30 ENCOUNTER — APPOINTMENT (OUTPATIENT)
Dept: RADIATION ONCOLOGY | Facility: HOSPITAL | Age: 74
End: 2024-05-30
Attending: INTERNAL MEDICINE
Payer: MEDICARE

## 2024-05-30 ENCOUNTER — APPOINTMENT (OUTPATIENT)
Dept: RADIATION ONCOLOGY | Facility: HOSPITAL | Age: 74
End: 2024-05-30
Payer: MEDICARE

## 2024-05-30 ENCOUNTER — HOSPITAL ENCOUNTER (OUTPATIENT)
Dept: INFUSION CENTER | Facility: HOSPITAL | Age: 74
Discharge: HOME | End: 2024-05-30
Attending: INTERNAL MEDICINE
Payer: MEDICARE

## 2024-05-30 VITALS
HEIGHT: 62 IN | BODY MASS INDEX: 20.33 KG/M2 | WEIGHT: 110.45 LBS | HEART RATE: 77 BPM | DIASTOLIC BLOOD PRESSURE: 52 MMHG | SYSTOLIC BLOOD PRESSURE: 97 MMHG | OXYGEN SATURATION: 98 % | RESPIRATION RATE: 20 BRPM | TEMPERATURE: 97.3 F

## 2024-05-30 DIAGNOSIS — E86.0 DEHYDRATION: ICD-10-CM

## 2024-05-30 DIAGNOSIS — C34.11 MALIGNANT NEOPLASM OF UPPER LOBE, RIGHT BRONCHUS OR LUNG (HCC): Primary | ICD-10-CM

## 2024-05-30 PROCEDURE — 96415 CHEMO IV INFUSION ADDL HR: CPT

## 2024-05-30 PROCEDURE — 77014 CHG CT GUIDANCE RADIATION THERAPY FLDS PLACEMENT: CPT | Performed by: RADIOLOGY

## 2024-05-30 PROCEDURE — 96361 HYDRATE IV INFUSION ADD-ON: CPT

## 2024-05-30 PROCEDURE — 77336 RADIATION PHYSICS CONSULT: CPT | Performed by: INTERNAL MEDICINE

## 2024-05-30 PROCEDURE — 96417 CHEMO IV INFUS EACH ADDL SEQ: CPT

## 2024-05-30 PROCEDURE — 96413 CHEMO IV INFUSION 1 HR: CPT

## 2024-05-30 PROCEDURE — 96376 TX/PRO/DX INJ SAME DRUG ADON: CPT

## 2024-05-30 PROCEDURE — 96375 TX/PRO/DX INJ NEW DRUG ADDON: CPT

## 2024-05-30 PROCEDURE — 96367 TX/PROPH/DG ADDL SEQ IV INF: CPT

## 2024-05-30 PROCEDURE — 77386 HB NTSTY MODUL RAD TX DLVR CPLX: CPT | Performed by: RADIOLOGY

## 2024-05-30 RX ORDER — FAMOTIDINE 10 MG/ML
20 INJECTION, SOLUTION INTRAVENOUS
Status: DISCONTINUED | OUTPATIENT
Start: 2024-05-30 | End: 2024-06-02 | Stop reason: HOSPADM

## 2024-05-30 RX ORDER — ALBUTEROL SULFATE 2.5 MG/3ML
2.5 SOLUTION RESPIRATORY (INHALATION) ONCE
Status: DISCONTINUED | OUTPATIENT
Start: 2024-05-30 | End: 2024-06-02 | Stop reason: HOSPADM

## 2024-05-30 RX ORDER — DIPHENHYDRAMINE HYDROCHLORIDE 50 MG/ML
50 INJECTION INTRAMUSCULAR; INTRAVENOUS ONCE
Status: COMPLETED | OUTPATIENT
Start: 2024-05-30 | End: 2024-05-30

## 2024-05-30 RX ORDER — SODIUM CHLORIDE 9 MG/ML
20 INJECTION, SOLUTION INTRAVENOUS ONCE
Status: COMPLETED | OUTPATIENT
Start: 2024-05-30 | End: 2024-05-30

## 2024-05-30 RX ADMIN — FAMOTIDINE 20 MG: 10 INJECTION, SOLUTION INTRAVENOUS at 08:59

## 2024-05-30 RX ADMIN — DEXAMETHASONE SODIUM PHOSPHATE: 10 INJECTION, SOLUTION INTRAMUSCULAR; INTRAVENOUS at 08:12

## 2024-05-30 RX ADMIN — CARBOPLATIN 144.8 MG: 450 INJECTION, SOLUTION INTRAVENOUS at 12:52

## 2024-05-30 RX ADMIN — DIPHENHYDRAMINE HYDROCHLORIDE 25 MG: 50 INJECTION, SOLUTION INTRAMUSCULAR; INTRAVENOUS at 08:36

## 2024-05-30 RX ADMIN — SODIUM CHLORIDE 1000 ML: 0.9 INJECTION, SOLUTION INTRAVENOUS at 13:28

## 2024-05-30 RX ADMIN — SODIUM CHLORIDE 20 ML/HR: 0.9 INJECTION, SOLUTION INTRAVENOUS at 08:12

## 2024-05-30 RX ADMIN — DIPHENHYDRAMINE HYDROCHLORIDE 25 MG: 50 INJECTION, SOLUTION INTRAMUSCULAR; INTRAVENOUS at 09:45

## 2024-05-30 RX ADMIN — PACLITAXEL 67.2 MG: 6 INJECTION, SOLUTION, CONCENTRATE INTRAVENOUS at 09:24

## 2024-05-30 RX ADMIN — FAMOTIDINE 20 MG: 10 INJECTION, SOLUTION INTRAVENOUS at 09:45

## 2024-05-30 RX ADMIN — HYDROCORTISONE SODIUM SUCCINATE 100 MG: 100 INJECTION, POWDER, FOR SOLUTION INTRAMUSCULAR; INTRAVENOUS at 09:45

## 2024-05-30 NOTE — PROGRESS NOTES
"At 0940 pt reported feeling a heavy discomfort in the middle of her chest, appears SOB, feeling hot in the face. Taxol stopped and hypersensitivity protocol initiated. BP was low 83/53 therefore only 25mg Benadryl given. Oxygen applied at 2Liters and pt reports feeling improved in SOB but still has discomfort in chest described as \"gas bubbles\". Notified Dr. Castro's nurse, Bailey Glez RN. Monitored pt an hour and notified that pt now reports feeling back to baseline with all symptoms resolved. Tolerating room air, O2sat 97%. Received order to rechallenge Taxol at slower titration rate.  "

## 2024-05-31 ENCOUNTER — APPOINTMENT (OUTPATIENT)
Dept: RADIATION ONCOLOGY | Facility: HOSPITAL | Age: 74
End: 2024-05-31
Attending: INTERNAL MEDICINE
Payer: MEDICARE

## 2024-05-31 PROCEDURE — 77014 CHG CT GUIDANCE RADIATION THERAPY FLDS PLACEMENT: CPT | Performed by: STUDENT IN AN ORGANIZED HEALTH CARE EDUCATION/TRAINING PROGRAM

## 2024-05-31 PROCEDURE — 77386 HB NTSTY MODUL RAD TX DLVR CPLX: CPT | Performed by: STUDENT IN AN ORGANIZED HEALTH CARE EDUCATION/TRAINING PROGRAM

## 2024-05-31 PROCEDURE — 77427 RADIATION TX MANAGEMENT X5: CPT | Performed by: INTERNAL MEDICINE

## 2024-06-02 RX ORDER — SODIUM CHLORIDE 9 MG/ML
20 INJECTION, SOLUTION INTRAVENOUS ONCE
Status: CANCELLED | OUTPATIENT
Start: 2024-06-06

## 2024-06-02 NOTE — PROGRESS NOTES
HEMATOLOGY / ONCOLOGY CLINIC FOLLOW UP NOTE    Patient Ayla Nye  MRN: [id number]  : 1950  Date of Encounter 6/3/2024      Oncology History   Malignant neoplasm of upper lobe, right bronchus or lung (HCC)    Initial Diagnosis    Primary lung adenocarcinoma, right (HCC)     3/26/2024 Biopsy    A-C. Lymph Node, Level 4R :    - Metastatic non-small cell carcinoma, most compatible with a primary lung adenocarcinoma; see note.       D-F. Lymph Node, Level 10R:    - Metastatic non-small cell carcinoma; see note.       G-I. Lymph Node, Level 4L:    - Metastatic non-small cell carcinoma; see note.       4/15/2024 -  Cancer Staged    Staging form: Lung, AJCC 8th Edition  - Clinical stage from 4/15/2024: Stage IIIB (cT2b, cN3, cM0) - Signed by Rogelio Beebe DO on 4/15/2024       2024 -  Chemotherapy    alteplase (CATHFLO), 2 mg, Intracatheter, Every 1 Minute as needed, 2 of 6 cycles  CARBOplatin (PARAPLATIN) IVPB (GOG AUC DOSING), 130.4 mg, Intravenous, Once, 2 of 6 cycles  Administration: 130.4 mg (2024)  PACLItaxel (TAXOL) chemo IVPB, 45 mg/m2 = 67.2 mg (90 % of original dose 50 mg/m2), Intravenous, Once, 2 of 6 cycles  Dose modification: 45 mg/m2 (original dose 50 mg/m2, Cycle 1, Reason: Anticipated Tolerance)  Administration: 67.2 mg (2024), 67.2 mg (2024)            Reason for Encounter: follow up ET and new lung mass on CRT C3 of 6 (2024)     C1 2024  C2  2024  C3 2024  C4 2024             Oncology History   Malignant neoplasm of upper lobe, right bronchus or lung (HCC)    Initial Diagnosis     Primary lung adenocarcinoma, right (HCC)      3/26/2024 Biopsy     A-C. Lymph Node, Level 4R :    - Metastatic non-small cell carcinoma, most compatible with a primary lung adenocarcinoma; see note.        D-F. Lymph Node, Level 10R:    - Metastatic non-small cell carcinoma; see note.        G-I. Lymph Node, Level 4L:    - Metastatic non-small cell  carcinoma; see note.         4/15/2024 -  Cancer Staged     Staging form: Lung, AJCC 8th Edition  - Clinical stage from 4/15/2024: Stage IIIB (cT2b, cN3, cM0) - Signed by Rogelio Beebe DO on 4/15/2024                     Discussion      cT2b N3 M0 Stage IIIB adenocarcinoma lung       Mikey    Kras C12V, p53  PDL1 TPS 5%  TMB 16 mut/Mb   Other  mutations negative           The optimal therapy of resectable Stage III NSCLC consists of systemic treatment combined with local approach with radiation and/or surgery.  Strategies include surgery followed by adjuvant chemotherapy  neoadjuvant chemotherapy or chemoimmunotherapy followed by surgery, neoadjuvant chemoradiotherapy followed by surgery or concurrent or sequential chemotherapy with definitive radiation therapy.      The accepted standard remains concurrent chemoradiotherapy although this is being challenged     The potential benefit of adding surgery to combined chemoradiotherapy for stage IIIA disease was noted in the Intergroup 0139 trial.  T1-3N2 NSCLC were assigned to concurrent chemorads 45 Gy with two cycles of Cisplatin/Etoposide followed by either surgery of continued chemorads.  PFS was longer in the surgery arm with no OS difference.      The two chemotherapy regimens that have been most commonly used in the [place] are the combination of cisplatin and etoposide and weekly carboplatin and paclitaxel:     Concurrent cisplatin (50 mg/m2 on days 1, 8, 29, and 36) plus etoposide (50 mg/m2 daily on days 1 to 5, and 29 to 33) with thoracic RT followed by two cycles of cisplatin plus etoposide was evaluated in a multicenter phase II trial of 50 patients with pathologically confirmed stage IIIB disease  With an average follow-up of 52 months, the three- and five-year survival rates were 17 and 15 percent, respectively. Treatment was complicated by grade 4 neutropenia in 32 percent and grade 3 or 4 esophagitis in 20 percent of patients. In a  "subsequent phase II trial, better results were seen with the same concurrent chemoradiation regimen followed by docetaxel consolidation, but the use of consolidation chemotherapy in this setting has been called into question by subsequent data       In a randomized phase II trial, carboplatin (area under the curve [AUC] = 2) and paclitaxel (45 mg/m2) given weekly with radiotherapy (63 Gy) followed by two cycles of consolidation therapy (carboplatin AUC = 6, paclitaxel 200 mg/m2) resulted in a median survival of 16.3 months.         Thus, for chemoradiation, the choices are cisplatin plus etoposide, in conjunction with once daily RT to a total of 60 Gy. An alternative \"radiosensitizing chemotherapy\" approach uses weekly carboplatin plus paclitaxel with approximately 60 Gy of radiation, followed by two cycles of consolidation with this same chemotherapy combination at standard systemic doses. The combination of pemetrexed and platinum agents has emerged as another acceptable alternative for stage III patients with nonsquamous cell histology.      Based on preclinical evidence demonstrating synergy between RT and IO, the Phase III [facility] trial used a PDL1 inhibitor Durvalumab after concurrent CRT in a randomized study involving 713 patients.  After median follow up of 34 months giving drug every 2 weeks for 12 months as consolidation, the use of durvalumab significantly improved PFR from 5.6 to 16.9 months and led to a significant improvement in OS with HR 0.72.  The incidence of new mets was also lower including brain mets.      Grade 3 or 4 adverse events occurred in 31 percent of the patients who received durvalumab and 26 percent of those who received placebo, with the most common severe adverse event being pneumonia. Adverse events of any grade that were of special interest, regardless of cause, were reported in 66 percent of patients receiving durvalumab versus 49 percent of those receiving placebo, most of " which were grade 1 or 2. Such events for durvalumab versus placebo included diarrhea (18 versus 19 percent), pneumonitis (13 versus 8 percent), rash (12 versus 7 percent), and pruritus (12 versus 5 percent). Adverse events of special interest for which patients received concomitant treatment were reported in 42 versus 17 of patients receiving durvalumab and placebo, respectively, with treatments typically including steroids, endocrine agents, and occasionally other immunosuppressive agents. Patient-reported quality of life was comparable between the two groups      These results demonstrate efficacy and tolerability of durvalumab for treatment of unresectable stage III NSCLC in patients who experience an objective response or stable disease following completion of chemoradiotherapy.     For this patient weekly Carboplatin AUC 2 and Taxol 45 mg/m2 weekly x 6 weeks with RT to 60-63 Gy will be used.  IO therapy, based on response, will be considered per the [facility] trial as above        Assessment/Plan:     Ms. 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  She has been tolerating Hydrea 500 mg once daily Monday through Friday and off on Saturday and Sunday. Patient also with 4.1 cm RUL mass 1.5cm spiculated posterior nodule mediastinal adenopathy, no metastatic disease including brain MRI new diagnosis      ET:      - hydroxyurea (HYDREA) 500 mg capsule; continue daily 500 mg 7 days per week; once treatment starts will hold-has been held since 5/23/2024     Plts 498     NSCLC/adenocarcinoma;mutational analysis pending      Now with RUL 4.2 cm mass with mediastinal adenopathy at least cT2a cN2 cM0 lung cancer     cT2b N3 Stage IIIB lung      Please see above regarding discussion     Patient to get C3/6 on the 6th June 2024     Carboplatin AUC 2 Taxol 45 mg/m2 weekly with RT     Durvalumab adjuvant post treatment depending on response          IgG  deficiency     IgG mildly low at 571.  This is not significantly decreased enough to cause the recurrent infections.  Will continue to monitor on subsequent labs.  No need for intervention at this time.     - Comprehensive metabolic panel; Future  - IgG, IgA, IgM; Future      Follow up     2 weeks                   History of present illness:  Initial Visit 4/10/2024     Ayla Nye is a 73-year-old female with past medical history of hypertension, psoriatic arthritis, CKD stage III, hyperlipidemia, and latent TB.  She is seen in follow-up for essential thrombocytosis.  She has had an elevated platelet count dating back to January 2028 all of her other cell lines were within normal limits.  Her highest platelet count was around 700,000.  Myeloproliferative work-up demonstrated positive JAK2 mutation and she was started on 500 mg of Hydrea daily in July 2020.  Due to leukopenia her dose was reduced to Monday, Wednesday, Friday.  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  Then again in July we had to decrease her dose and she is taking 500 mg once daily Monday through Friday and not taking any on Saturday or Sunday.     She has been tolerating the 500 mg of Hydrea Monday through Friday off Saturday and Sunday well without any side effects.  She was seen by my attending and underwent further workup of her recurrent infections and was found to have a mildly low IgG level.   She has had pneumonia twice once in October and then again in February.  She states that she has had multiple imaging of her chest which does suggest scarring which prompted a CT scan of her chest .  .  She is also had recurrent sinus infections.  She is a former smoker and quit 15 years ago bit was 1[[d x more than 30 years.        She does not have any  continued symptoms of pneumonia and no cough, shortness of breath, or fevers.       She underwent the following testing; EBUS with pulmonary as well as CT chest/PET scan     Lung mass on CT chest 2/28/2024     LUNGS:     -4.1 x 3.4 cm solid mass in the anterior right upper lobe (series 302, image 73).     -Posterior to this mass, there is a 1.5 x 1.5 cm spiculated nodule (series 302, image 78)     -2.5 x 2.1 cm groundglass nodule in the anterior left upper lobe (series 302, image 90)     Mild emphysema.     Right apical pleural-parenchymal scarring.     PLEURA: Small calcified left posterior pleural plaque, which may be from prior asbestos exposure or the sequela of old inflammation.     HEART/GREAT VESSELS: Normal heart size. Mild-moderate coronary artery calcification. Mild to moderate mitral annular calcification. Trace pericardial effusion. No thoracic aortic aneurysm.     MEDIASTINUM AND SUDEEP: 1.6 x 2.0 cm right lower paratracheal/precarinal lymph node. 1.3 x 1.3 cm right lower paratracheal lymph node.        CT PET  3/28/2024     Intensely FDG avid right upper lobe mass, SUV max of 18. This abuts the anterior pleural margin. Central photopenia suggest necrosis. Mass measures on the order of 4.2 cm in size, stable previously 4.1 cm.     Subjacent 1.5 cm spiculated nodule posteriorly demonstrates minimal uptake, SUV max of 1.7.     Minimal FDG uptake in the left upper lobe groundglass nodule, SUV max of 2.2. This measured up to 2.5 cm in size on recent CT, appears grossly stable on low-dose CT.     A few FDG-avid mediastinal lymph nodes. Right anterior paratracheal lymph node demonstrates SUV max of 13. This measures up to 1.9 cm short axis image 85 series 3, may be slightly larger previously 1.6 cm.     A right perihilar lymph node demonstrates SUV max of 12. This measures on the order of 1.2 cm short axis image 89 series 3.  CT images: Scattered emphysematous changes of the lung fields. Mild to moderate  coronary artery calcifications. Minimal left pleural calcification. Small calcified lymph nodes in the left perihilar region        3/26/2024     -C. Lymph Node, Level 4R (ThinPrep, smears and cell block preparations):    - Metastatic non-small cell carcinoma, most compatible with a primary lung adenocarcinoma; see note.    - Satisfactory for evaluation.     D-F. Lymph Node, Level 10R (ThinPrep, smears and cell block preparations):    - Metastatic non-small cell carcinoma; see note.    - Satisfactory for evaluation.     G-I. Lymph Node, Level 4L (ThinPrep, smears and cell block preparations):    - Metastatic non-small cell carcinoma; see note.    - Satisfactory for evaluation.             Interval History:  6/3/2024    Ms Nye is tolerating treatment fairly well but did have a reaction to Taxol at C2.  She has flushing and chest pain; drug was stopped she was given Benadryl/steroids/fluids and started at slower rate.  She has not had issues with N/V but with constipation.  Her joints are more painful.  She is eating but taste is an issue, probably due to Carboplatin.  She denies any GERD but has increased cough which is from CRT.  She has no SOB.  We discussed Mucinex to help cut the phlegm generated from treatment.  Her weight is 110 pounds which is slightly decreased; her BP is 118/64. She has no other issues          REVIEW OF SYSTEMS:  Please note that a 14-point review of systems was performed to include Constitutional, HEENT, Respiratory, CVS, GI, , Musculoskeletal, Integumentary, Neurologic, Rheumatologic, Endocrinologic, Psychiatric, Lymphatic, and Hematologic/Oncologic systems were reviewed and are negative unless otherwise stated in HPI. Positive and negative findings pertinent to this evaluation are incorporated into the history of present illness.      ECOG PS: 1    PROBLEM LIST:  Patient Active Problem List   Diagnosis    TB lung, latent    Psoriatic arthritis (HCC)    Essential thrombocytosis  (HCC)    HTN (hypertension), benign    Mixed hyperlipidemia    Encounter for monitoring of hydroxyurea therapy    JAK2 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    Age-related osteoporosis without current pathological fracture    Stage 3b chronic kidney disease (CKD) (HCC)    Malignant neoplasm of upper lobe, right bronchus or lung (HCC)    Bilateral leg pain    Insomnia    Moderate protein-calorie malnutrition (HCC)    Dehydration       Past Medical History:   has a past medical history of Allergic (2022), Essential thrombocytosis (HCC), Hyperlipidemia, Hypertension, Mass in chest, Nodular goiter, and Psoriasis.    PAST SURGICAL HISTORY:   has a past surgical history that includes Appendectomy; Mammo needle localization right (all inc) (Right, 07/13/2009); Skin lesion excision; Colonoscopy (10/31/2018); Mammo (historical); DXA procedure(historical) (04/21/2017); and Breast cyst excision (Right, 2009).    CURRENT MEDICATIONS  Current Outpatient Medications   Medication Sig Dispense Refill    amLODIPine (NORVASC) 5 mg tablet Take 5 mg by mouth daily      aspirin 81 MG tablet Take 81 mg by mouth daily       Cholecalciferol (VITAMIN D3) 5000 units TABS 5,000 Units Daily       diphenhydrAMINE (BENADRYL) 25 mg capsule Take 25 mg by mouth every 12 (twelve) hours as needed for itching       Fluocinolone Acetonide Scalp 0.01 % OIL       halobetasol (ULTRAVATE) 0.05 % ointment        hydrocortisone 2.5 % ointment       hydroxyurea (HYDREA) 500 mg capsule Take 1 capsule (500 mg total) by mouth daily (Patient taking differently: Take 500 mg by mouth daily On hold) 90 capsule 3    losartan (COZAAR) 50 mg tablet Take 1 tablet (50 mg total) by mouth daily 90 tablet 1    ondansetron (ZOFRAN) 8 mg tablet Take 1 tablet (8 mg total) by mouth every 8 (eight) hours as needed for nausea or vomiting 30 tablet 1    Probiotic Product (PROBIOTIC DAILY PO) Take 1 capsule by mouth daily      prochlorperazine (COMPAZINE) 10 mg tablet Take 1 tablet (10 mg total) by mouth  every 6 (six) hours as needed for nausea or vomiting 30 tablet 1    vitamin B-12 (VITAMIN B-12) 1,000 mcg tablet Take 1 tablet (1,000 mcg total) by mouth daily 90 tablet 1     No current facility-administered medications for this visit.     [unfilled]    SOCIAL HISTORY:   reports that she has quit smoking. Her smoking use included cigarettes. She started smoking about 58 years ago. She has a 58.4 pack-year smoking history. She has never used smokeless tobacco. She reports that she does not currently use alcohol. She reports that she does not use drugs.     FAMILY HISTORY:  family history includes Cancer in her brother and brother; Coronary artery disease in her brother; Depression in her mother; Hearing loss in her mother; Hypertension in her brother and mother; No Known Problems in her daughter, daughter, maternal aunt, maternal aunt, maternal aunt, maternal aunt, maternal grandfather, maternal grandmother, paternal aunt, paternal grandfather, and paternal grandmother; Stroke in her mother; Tuberculosis in her brother and father.     ALLERGIES:  is allergic to doxycycline, keflex [cephalexin], and neomycin-polymyxin-dexameth.      Physical Exam:  Vital Signs:   Visit Vitals  OB Status Postmenopausal   Smoking Status Former     There is no height or weight on file to calculate BMI.  There is no height or weight on file to calculate BSA.    GEN: Alert, awake oriented x3, in no acute distress  HEENT- No pallor, icterus, cyanosis, no oral mucosal lesions,   LAD - no palpable cervical, clavicle, axillary, inguinal LAD  Heart- normal S1 S2, regular rate and rhythm, No murmur, rubs.   Lungs- clear breathing sound bilateral.   Abdomen- soft, Non tender, bowel sounds present  Extremities- No cyanosis, clubbing, edema  Neuro- No focal neurological deficit    Labs:  Lab Results   Component Value Date    WBC 7.17 05/28/2024    HGB 9.7 (L) 05/28/2024    HCT 31.3 (L) 05/28/2024     (H) 05/28/2024     (H) 05/28/2024      Lab Results   Component Value Date    SODIUM 136 05/28/2024    K 4.3 05/28/2024     05/28/2024    CO2 28 05/28/2024    AGAP 6 05/28/2024    BUN 16 05/28/2024    CREATININE 0.84 05/28/2024    GLUC 149 (H) 05/20/2024    GLUF 95 05/28/2024    CALCIUM 8.7 05/28/2024    AST 8 (L) 05/28/2024    ALT 5 (L) 05/28/2024    ALKPHOS 61 05/28/2024    TP 6.3 (L) 05/28/2024    TBILI 0.31 05/28/2024    EGFR 69 05/28/2024           I spent 40 minutes on chart review, face to face counseling time, coordination of care and documentation.    Rosalinda Castro MD PhD

## 2024-06-03 ENCOUNTER — OFFICE VISIT (OUTPATIENT)
Dept: HEMATOLOGY ONCOLOGY | Facility: CLINIC | Age: 74
End: 2024-06-03
Payer: MEDICARE

## 2024-06-03 ENCOUNTER — APPOINTMENT (OUTPATIENT)
Dept: RADIATION ONCOLOGY | Facility: HOSPITAL | Age: 74
End: 2024-06-03
Attending: INTERNAL MEDICINE
Payer: MEDICARE

## 2024-06-03 ENCOUNTER — APPOINTMENT (OUTPATIENT)
Dept: LAB | Facility: CLINIC | Age: 74
End: 2024-06-03
Payer: MEDICARE

## 2024-06-03 VITALS
BODY MASS INDEX: 20.24 KG/M2 | RESPIRATION RATE: 16 BRPM | DIASTOLIC BLOOD PRESSURE: 64 MMHG | WEIGHT: 110 LBS | OXYGEN SATURATION: 95 % | HEIGHT: 62 IN | SYSTOLIC BLOOD PRESSURE: 118 MMHG | TEMPERATURE: 98.2 F | HEART RATE: 87 BPM

## 2024-06-03 DIAGNOSIS — T45.1X5A CHEMOTHERAPY INDUCED NAUSEA AND VOMITING: ICD-10-CM

## 2024-06-03 DIAGNOSIS — R11.2 CHEMOTHERAPY INDUCED NAUSEA AND VOMITING: ICD-10-CM

## 2024-06-03 DIAGNOSIS — Z15.89 JAK2 V617F MUTATION: ICD-10-CM

## 2024-06-03 DIAGNOSIS — C34.11 MALIGNANT NEOPLASM OF UPPER LOBE, RIGHT BRONCHUS OR LUNG (HCC): ICD-10-CM

## 2024-06-03 DIAGNOSIS — D80.3 IGG DEFICIENCY (HCC): ICD-10-CM

## 2024-06-03 DIAGNOSIS — C34.91 NSCLC OF RIGHT LUNG (HCC): Primary | ICD-10-CM

## 2024-06-03 LAB
ALBUMIN SERPL BCP-MCNC: 3.5 G/DL (ref 3.5–5)
ALP SERPL-CCNC: 60 U/L (ref 34–104)
ALT SERPL W P-5'-P-CCNC: 6 U/L (ref 7–52)
ANION GAP SERPL CALCULATED.3IONS-SCNC: 6 MMOL/L (ref 4–13)
AST SERPL W P-5'-P-CCNC: 8 U/L (ref 13–39)
BASOPHILS # BLD AUTO: 0.03 THOUSANDS/ÂΜL (ref 0–0.1)
BASOPHILS NFR BLD AUTO: 0 % (ref 0–1)
BILIRUB SERPL-MCNC: 0.47 MG/DL (ref 0.2–1)
BUN SERPL-MCNC: 14 MG/DL (ref 5–25)
CALCIUM SERPL-MCNC: 8.6 MG/DL (ref 8.4–10.2)
CHLORIDE SERPL-SCNC: 101 MMOL/L (ref 96–108)
CO2 SERPL-SCNC: 28 MMOL/L (ref 21–32)
CREAT SERPL-MCNC: 0.9 MG/DL (ref 0.6–1.3)
EOSINOPHIL # BLD AUTO: 0.11 THOUSAND/ÂΜL (ref 0–0.61)
EOSINOPHIL NFR BLD AUTO: 2 % (ref 0–6)
ERYTHROCYTE [DISTWIDTH] IN BLOOD BY AUTOMATED COUNT: 15 % (ref 11.6–15.1)
GFR SERPL CREATININE-BSD FRML MDRD: 63 ML/MIN/1.73SQ M
GLUCOSE P FAST SERPL-MCNC: 95 MG/DL (ref 65–99)
HCT VFR BLD AUTO: 29.7 % (ref 34.8–46.1)
HGB BLD-MCNC: 9.3 G/DL (ref 11.5–15.4)
IMM GRANULOCYTES # BLD AUTO: 0.08 THOUSAND/UL (ref 0–0.2)
IMM GRANULOCYTES NFR BLD AUTO: 1 % (ref 0–2)
LDH SERPL-CCNC: 229 U/L (ref 140–271)
LYMPHOCYTES # BLD AUTO: 0.31 THOUSANDS/ÂΜL (ref 0.6–4.47)
LYMPHOCYTES NFR BLD AUTO: 4 % (ref 14–44)
MAGNESIUM SERPL-MCNC: 1.9 MG/DL (ref 1.9–2.7)
MCH RBC QN AUTO: 31.7 PG (ref 26.8–34.3)
MCHC RBC AUTO-ENTMCNC: 31.3 G/DL (ref 31.4–37.4)
MCV RBC AUTO: 101 FL (ref 82–98)
MONOCYTES # BLD AUTO: 0.47 THOUSAND/ÂΜL (ref 0.17–1.22)
MONOCYTES NFR BLD AUTO: 6 % (ref 4–12)
NEUTROPHILS # BLD AUTO: 6.31 THOUSANDS/ÂΜL (ref 1.85–7.62)
NEUTS SEG NFR BLD AUTO: 87 % (ref 43–75)
NRBC BLD AUTO-RTO: 0 /100 WBCS
PLATELET # BLD AUTO: 434 THOUSANDS/UL (ref 149–390)
PMV BLD AUTO: 8.7 FL (ref 8.9–12.7)
POTASSIUM SERPL-SCNC: 4.3 MMOL/L (ref 3.5–5.3)
PROT SERPL-MCNC: 6.3 G/DL (ref 6.4–8.4)
RBC # BLD AUTO: 2.93 MILLION/UL (ref 3.81–5.12)
SODIUM SERPL-SCNC: 135 MMOL/L (ref 135–147)
WBC # BLD AUTO: 7.31 THOUSAND/UL (ref 4.31–10.16)

## 2024-06-03 PROCEDURE — 83735 ASSAY OF MAGNESIUM: CPT

## 2024-06-03 PROCEDURE — 80053 COMPREHEN METABOLIC PANEL: CPT

## 2024-06-03 PROCEDURE — 77014 CHG CT GUIDANCE RADIATION THERAPY FLDS PLACEMENT: CPT | Performed by: RADIOLOGY

## 2024-06-03 PROCEDURE — 77386 HB NTSTY MODUL RAD TX DLVR CPLX: CPT | Performed by: RADIOLOGY

## 2024-06-03 PROCEDURE — 85025 COMPLETE CBC W/AUTO DIFF WBC: CPT

## 2024-06-03 PROCEDURE — 83615 LACTATE (LD) (LDH) ENZYME: CPT

## 2024-06-03 PROCEDURE — 99215 OFFICE O/P EST HI 40 MIN: CPT | Performed by: INTERNAL MEDICINE

## 2024-06-03 PROCEDURE — 36415 COLL VENOUS BLD VENIPUNCTURE: CPT

## 2024-06-04 ENCOUNTER — APPOINTMENT (OUTPATIENT)
Dept: RADIATION ONCOLOGY | Facility: HOSPITAL | Age: 74
End: 2024-06-04
Attending: INTERNAL MEDICINE
Payer: MEDICARE

## 2024-06-04 PROCEDURE — 77014 CHG CT GUIDANCE RADIATION THERAPY FLDS PLACEMENT: CPT | Performed by: RADIOLOGY

## 2024-06-04 PROCEDURE — 77386 HB NTSTY MODUL RAD TX DLVR CPLX: CPT | Performed by: RADIOLOGY

## 2024-06-05 ENCOUNTER — APPOINTMENT (OUTPATIENT)
Dept: RADIATION ONCOLOGY | Facility: HOSPITAL | Age: 74
End: 2024-06-05
Attending: INTERNAL MEDICINE
Payer: MEDICARE

## 2024-06-05 PROCEDURE — 77386 HB NTSTY MODUL RAD TX DLVR CPLX: CPT | Performed by: RADIOLOGY

## 2024-06-05 PROCEDURE — 77014 CHG CT GUIDANCE RADIATION THERAPY FLDS PLACEMENT: CPT | Performed by: RADIOLOGY

## 2024-06-06 ENCOUNTER — APPOINTMENT (OUTPATIENT)
Dept: RADIATION ONCOLOGY | Facility: HOSPITAL | Age: 74
End: 2024-06-06
Attending: INTERNAL MEDICINE
Payer: MEDICARE

## 2024-06-06 ENCOUNTER — HOSPITAL ENCOUNTER (OUTPATIENT)
Dept: INFUSION CENTER | Facility: HOSPITAL | Age: 74
Discharge: HOME | End: 2024-06-06
Attending: INTERNAL MEDICINE
Payer: MEDICARE

## 2024-06-06 VITALS
TEMPERATURE: 97.1 F | OXYGEN SATURATION: 99 % | BODY MASS INDEX: 19.88 KG/M2 | WEIGHT: 108.03 LBS | RESPIRATION RATE: 18 BRPM | HEART RATE: 81 BPM | SYSTOLIC BLOOD PRESSURE: 127 MMHG | DIASTOLIC BLOOD PRESSURE: 62 MMHG | HEIGHT: 62 IN

## 2024-06-06 DIAGNOSIS — C34.11 MALIGNANT NEOPLASM OF UPPER LOBE, RIGHT BRONCHUS OR LUNG (HCC): Primary | ICD-10-CM

## 2024-06-06 DIAGNOSIS — E86.0 DEHYDRATION: ICD-10-CM

## 2024-06-06 PROCEDURE — 96413 CHEMO IV INFUSION 1 HR: CPT

## 2024-06-06 PROCEDURE — 96417 CHEMO IV INFUS EACH ADDL SEQ: CPT

## 2024-06-06 PROCEDURE — 77014 CHG CT GUIDANCE RADIATION THERAPY FLDS PLACEMENT: CPT | Performed by: INTERNAL MEDICINE

## 2024-06-06 PROCEDURE — 77336 RADIATION PHYSICS CONSULT: CPT | Performed by: INTERNAL MEDICINE

## 2024-06-06 PROCEDURE — 96367 TX/PROPH/DG ADDL SEQ IV INF: CPT

## 2024-06-06 PROCEDURE — 77386 HB NTSTY MODUL RAD TX DLVR CPLX: CPT | Performed by: INTERNAL MEDICINE

## 2024-06-06 RX ORDER — SODIUM CHLORIDE 9 MG/ML
20 INJECTION, SOLUTION INTRAVENOUS ONCE
OUTPATIENT
Start: 2024-06-13

## 2024-06-06 RX ORDER — SODIUM CHLORIDE 9 MG/ML
20 INJECTION, SOLUTION INTRAVENOUS ONCE
Status: COMPLETED | OUTPATIENT
Start: 2024-06-06 | End: 2024-06-06

## 2024-06-06 RX ADMIN — DEXAMETHASONE SODIUM PHOSPHATE: 10 INJECTION, SOLUTION INTRAMUSCULAR; INTRAVENOUS at 10:44

## 2024-06-06 RX ADMIN — PACLITAXEL 67.2 MG: 6 INJECTION, SOLUTION, CONCENTRATE INTRAVENOUS at 12:03

## 2024-06-06 RX ADMIN — DIPHENHYDRAMINE HYDROCHLORIDE 25 MG: 50 INJECTION, SOLUTION INTRAMUSCULAR; INTRAVENOUS at 11:09

## 2024-06-06 RX ADMIN — CARBOPLATIN 138.4 MG: 600 INJECTION, SOLUTION INTRAVENOUS at 13:13

## 2024-06-06 RX ADMIN — FAMOTIDINE 20 MG: 10 INJECTION, SOLUTION INTRAVENOUS at 11:29

## 2024-06-06 RX ADMIN — SODIUM CHLORIDE 20 ML/HR: 0.9 INJECTION, SOLUTION INTRAVENOUS at 10:41

## 2024-06-06 NOTE — PROGRESS NOTES
Pt received chemo infusion w/out any adverse effects, labs w/in parameters. Returning tomorrow for hydration, pt aware of appt. Declined AVS

## 2024-06-07 ENCOUNTER — HOSPITAL ENCOUNTER (OUTPATIENT)
Dept: INFUSION CENTER | Facility: HOSPITAL | Age: 74
End: 2024-06-07
Attending: INTERNAL MEDICINE
Payer: MEDICARE

## 2024-06-07 ENCOUNTER — APPOINTMENT (OUTPATIENT)
Dept: RADIATION ONCOLOGY | Facility: HOSPITAL | Age: 74
End: 2024-06-07
Attending: INTERNAL MEDICINE
Payer: MEDICARE

## 2024-06-07 VITALS
TEMPERATURE: 97.2 F | OXYGEN SATURATION: 98 % | DIASTOLIC BLOOD PRESSURE: 59 MMHG | RESPIRATION RATE: 18 BRPM | HEART RATE: 76 BPM | SYSTOLIC BLOOD PRESSURE: 127 MMHG

## 2024-06-07 DIAGNOSIS — E86.0 DEHYDRATION: ICD-10-CM

## 2024-06-07 DIAGNOSIS — C34.11 MALIGNANT NEOPLASM OF UPPER LOBE, RIGHT BRONCHUS OR LUNG (HCC): Primary | ICD-10-CM

## 2024-06-07 PROCEDURE — 77014 CHG CT GUIDANCE RADIATION THERAPY FLDS PLACEMENT: CPT | Performed by: RADIOLOGY

## 2024-06-07 PROCEDURE — 96360 HYDRATION IV INFUSION INIT: CPT

## 2024-06-07 PROCEDURE — 77386 HB NTSTY MODUL RAD TX DLVR CPLX: CPT | Performed by: RADIOLOGY

## 2024-06-07 PROCEDURE — 77427 RADIATION TX MANAGEMENT X5: CPT | Performed by: INTERNAL MEDICINE

## 2024-06-07 RX ADMIN — SODIUM CHLORIDE 1000 ML: 0.9 INJECTION, SOLUTION INTRAVENOUS at 10:12

## 2024-06-07 NOTE — PROGRESS NOTES
Ayla Nye  tolerated treatment well with no complications.      Ayla Nye is aware of future appt on 6/13/24 at 1030.     AVS printed and given to Ayla Nye:    No (Declined by Ayla Nye)

## 2024-06-10 ENCOUNTER — APPOINTMENT (OUTPATIENT)
Dept: LAB | Facility: CLINIC | Age: 74
End: 2024-06-10
Payer: MEDICARE

## 2024-06-10 ENCOUNTER — APPOINTMENT (OUTPATIENT)
Dept: RADIATION ONCOLOGY | Facility: HOSPITAL | Age: 74
End: 2024-06-10
Attending: INTERNAL MEDICINE
Payer: MEDICARE

## 2024-06-10 DIAGNOSIS — C34.11 MALIGNANT NEOPLASM OF UPPER LOBE, RIGHT BRONCHUS OR LUNG (HCC): ICD-10-CM

## 2024-06-10 LAB
ALBUMIN SERPL BCP-MCNC: 3.5 G/DL (ref 3.5–5)
ALP SERPL-CCNC: 57 U/L (ref 34–104)
ALT SERPL W P-5'-P-CCNC: 5 U/L (ref 7–52)
ANION GAP SERPL CALCULATED.3IONS-SCNC: 7 MMOL/L (ref 4–13)
AST SERPL W P-5'-P-CCNC: 8 U/L (ref 13–39)
BASOPHILS # BLD AUTO: 0.04 THOUSANDS/ÂΜL (ref 0–0.1)
BASOPHILS NFR BLD AUTO: 1 % (ref 0–1)
BILIRUB SERPL-MCNC: 0.37 MG/DL (ref 0.2–1)
BUN SERPL-MCNC: 14 MG/DL (ref 5–25)
CALCIUM SERPL-MCNC: 8.6 MG/DL (ref 8.4–10.2)
CHLORIDE SERPL-SCNC: 103 MMOL/L (ref 96–108)
CO2 SERPL-SCNC: 26 MMOL/L (ref 21–32)
CREAT SERPL-MCNC: 0.84 MG/DL (ref 0.6–1.3)
EOSINOPHIL # BLD AUTO: 0.06 THOUSAND/ÂΜL (ref 0–0.61)
EOSINOPHIL NFR BLD AUTO: 1 % (ref 0–6)
ERYTHROCYTE [DISTWIDTH] IN BLOOD BY AUTOMATED COUNT: 14.8 % (ref 11.6–15.1)
GFR SERPL CREATININE-BSD FRML MDRD: 69 ML/MIN/1.73SQ M
GLUCOSE SERPL-MCNC: 90 MG/DL (ref 65–140)
HCT VFR BLD AUTO: 30.2 % (ref 34.8–46.1)
HGB BLD-MCNC: 9.4 G/DL (ref 11.5–15.4)
IMM GRANULOCYTES # BLD AUTO: 0.03 THOUSAND/UL (ref 0–0.2)
IMM GRANULOCYTES NFR BLD AUTO: 1 % (ref 0–2)
LDH SERPL-CCNC: 193 U/L (ref 140–271)
LYMPHOCYTES # BLD AUTO: 0.24 THOUSANDS/ÂΜL (ref 0.6–4.47)
LYMPHOCYTES NFR BLD AUTO: 4 % (ref 14–44)
MAGNESIUM SERPL-MCNC: 1.9 MG/DL (ref 1.9–2.7)
MCH RBC QN AUTO: 31.9 PG (ref 26.8–34.3)
MCHC RBC AUTO-ENTMCNC: 31.1 G/DL (ref 31.4–37.4)
MCV RBC AUTO: 102 FL (ref 82–98)
MONOCYTES # BLD AUTO: 0.36 THOUSAND/ÂΜL (ref 0.17–1.22)
MONOCYTES NFR BLD AUTO: 6 % (ref 4–12)
NEUTROPHILS # BLD AUTO: 5.21 THOUSANDS/ÂΜL (ref 1.85–7.62)
NEUTS SEG NFR BLD AUTO: 87 % (ref 43–75)
NRBC BLD AUTO-RTO: 0 /100 WBCS
PLATELET # BLD AUTO: 388 THOUSANDS/UL (ref 149–390)
PMV BLD AUTO: 9 FL (ref 8.9–12.7)
POTASSIUM SERPL-SCNC: 4 MMOL/L (ref 3.5–5.3)
PROT SERPL-MCNC: 6.1 G/DL (ref 6.4–8.4)
RBC # BLD AUTO: 2.95 MILLION/UL (ref 3.81–5.12)
SODIUM SERPL-SCNC: 136 MMOL/L (ref 135–147)
WBC # BLD AUTO: 5.94 THOUSAND/UL (ref 4.31–10.16)

## 2024-06-10 PROCEDURE — 80053 COMPREHEN METABOLIC PANEL: CPT

## 2024-06-10 PROCEDURE — 36415 COLL VENOUS BLD VENIPUNCTURE: CPT

## 2024-06-10 PROCEDURE — 83735 ASSAY OF MAGNESIUM: CPT

## 2024-06-10 PROCEDURE — 83615 LACTATE (LD) (LDH) ENZYME: CPT

## 2024-06-10 PROCEDURE — 77386 HB NTSTY MODUL RAD TX DLVR CPLX: CPT | Performed by: RADIOLOGY

## 2024-06-10 PROCEDURE — 85025 COMPLETE CBC W/AUTO DIFF WBC: CPT

## 2024-06-10 PROCEDURE — 77014 CHG CT GUIDANCE RADIATION THERAPY FLDS PLACEMENT: CPT | Performed by: RADIOLOGY

## 2024-06-11 ENCOUNTER — APPOINTMENT (OUTPATIENT)
Dept: RADIATION ONCOLOGY | Facility: HOSPITAL | Age: 74
End: 2024-06-11
Attending: INTERNAL MEDICINE
Payer: MEDICARE

## 2024-06-11 ENCOUNTER — OFFICE VISIT (OUTPATIENT)
Dept: PULMONOLOGY | Facility: CLINIC | Age: 74
End: 2024-06-11
Payer: MEDICARE

## 2024-06-11 VITALS
TEMPERATURE: 96.7 F | OXYGEN SATURATION: 99 % | DIASTOLIC BLOOD PRESSURE: 68 MMHG | SYSTOLIC BLOOD PRESSURE: 116 MMHG | BODY MASS INDEX: 19.69 KG/M2 | HEIGHT: 62 IN | WEIGHT: 107 LBS | HEART RATE: 83 BPM

## 2024-06-11 DIAGNOSIS — R59.0 PARATRACHEAL LYMPHADENOPATHY: ICD-10-CM

## 2024-06-11 DIAGNOSIS — C34.91 NON-SMALL CELL CANCER OF RIGHT LUNG (HCC): ICD-10-CM

## 2024-06-11 DIAGNOSIS — R91.8 MULTIPLE LUNG NODULES ON CT: Primary | ICD-10-CM

## 2024-06-11 PROCEDURE — G2211 COMPLEX E/M VISIT ADD ON: HCPCS | Performed by: STUDENT IN AN ORGANIZED HEALTH CARE EDUCATION/TRAINING PROGRAM

## 2024-06-11 PROCEDURE — 77386 HB NTSTY MODUL RAD TX DLVR CPLX: CPT | Performed by: RADIOLOGY

## 2024-06-11 PROCEDURE — 77014 CHG CT GUIDANCE RADIATION THERAPY FLDS PLACEMENT: CPT | Performed by: RADIOLOGY

## 2024-06-11 PROCEDURE — 99213 OFFICE O/P EST LOW 20 MIN: CPT | Performed by: STUDENT IN AN ORGANIZED HEALTH CARE EDUCATION/TRAINING PROGRAM

## 2024-06-11 NOTE — PROGRESS NOTES
Consultation - Pulmonary Medicine   Ayla Nye 73 y.o. female MRN: [id number]      Reason for Consult: Lung Mass    Ayla Nye is a 73 y.o. female with a PMH of HTN, Latent TB(Treated INH 2022), Psoriatic Arthritis, CKD, Thrombocytosis(JAK2) who presents for lung mass.    Lung AdenoCx - Stage IIIB - Kras C12V, p53, PDL1 TPS 5%, TMB 16 mut/Mb  - started Carboplatin/Paclitaxel. Tolerating therapy with minimal associated symptoms. She has some dyphagia with food getting stuck but will follow up with her oncologist for further management. Cough is benign and tolerated.      Rogelio Crisostomo MD  [facility]    _____________________________________________________________________  Interval Hx 06/11/24:   Patient started chemo/radiation  Cough-  occasional, dry -   Tolerating chemotherapy, generally maintaining a normal weight  Good sleep  Some dysphagia - food stuck with large portion       HPI:    Ayla Nye is a 73 y.o. female with a PMH of HTN, Latent TB(Treated INH 2022), Psoriatic Arthritis, CKD, Thrombocytosis(JAK2) who presents for lung mass.    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  None    Imaging:  I personally reviewed the images on the PAC system pertinent to today's visit  CT Chest  1. 4.1 x 3.4 cm right upper lobe mass. Differential considerations include bronchogenic malignancy or pneumonia. If the patient does not have signs of pulmonary infection, FDG PET/CT and tissue sampling is recommended for further evaluation. If the   patient does have clinical signs of pulmonary infection, recommend treatment and follow-up CT in approximately 2 months for reassessment.     2.  "1.5 cm spiculated nodule adjacent to this mass, which may represent satellite nodule or infectious/inflammatory process.     3. 2.5 x 2.1 cm left upper lobe groundglass nodule. This be infectious/inflammatory or represent a separate primary lung adenocarcinoma spectrum lesion.     4. 2 enlarged right lower paratracheal lymph nodes, possibly malignant yaw involvement. This can either be assessed on follow-up FDG PET/CT.       Other studies:  Lymph Node 3/26      A-C. Lymph Node, Level 4R (ThinPrep, smears and cell block preparations):    - Metastatic non-small cell carcinoma, most compatible with a primary lung adenocarcinoma; see note.    - Satisfactory for evaluation.     D-F. Lymph Node, Level 10R (ThinPrep, smears and cell block preparations):    - Metastatic non-small cell carcinoma; see note.    - Satisfactory for evaluation.     G-I. Lymph Node, Level 4L (ThinPrep, smears and cell block preparations):    - Metastatic non-small cell carcinoma; see note.    - Satisfactory for evaluation.    PhysicalExamination:  Vitals:   /68 (BP Location: Left arm, Patient Position: Sitting, Cuff Size: Adult)   Pulse 83   Temp (!) 96.7 °F (35.9 °C) (Tympanic)   Ht 5' 2\" (1.575 m)   Wt 48.5 kg (107 lb)   SpO2 99%   BMI 19.57 kg/m²   Physical Exam: Unchanged  Appearance -- NAD, speaking full sentences  Neuro -- A&Ox3  Neck -- no JVD  Heart -- RRR, no murmurs  Lungs -- CTAB  Abdomen -- soft, NTND  Extremities -- WWP, no LE edema  Skin -- no rash    Review of Systems:  Aside from what is mentioned in the HPI, the review of systems otherwise negative.    Immunization History   Administered Date(s) Administered    COVID-19 PFIZER VACCINE 0.3 ML IM 02/17/2021, 03/09/2021, 11/15/2021, 11/27/2021    INFLUENZA 10/10/2014, 10/14/2015, 10/26/2017, 10/08/2018, 10/17/2019, 10/12/2021    Influenza, high dose seasonal 0.7 mL 10/12/2022, 09/27/2023    Pneumococcal Conjugate 13-Valent 01/16/2017    Pneumococcal Polysaccharide PPV23 " 01/18/2018    Td (adult), adsorbed 12/24/2015, 10/27/2021    Zoster 01/31/2014        Current Medications:    Current Outpatient Medications:     amLODIPine (NORVASC) 5 mg tablet, Take 5 mg by mouth daily, Disp: , Rfl:     aspirin 81 MG tablet, Take 81 mg by mouth daily , Disp: , Rfl:     Cholecalciferol (VITAMIN D3) 5000 units TABS, 5,000 Units Daily , Disp: , Rfl:     diphenhydrAMINE (BENADRYL) 25 mg capsule, Take 25 mg by mouth every 12 (twelve) hours as needed for itching , Disp: , Rfl:     Fluocinolone Acetonide Scalp 0.01 % OIL, , Disp: , Rfl:     halobetasol (ULTRAVATE) 0.05 % ointment,  , Disp: , Rfl:     hydrocortisone 2.5 % ointment, , Disp: , Rfl:     hydroxyurea (HYDREA) 500 mg capsule, Take 1 capsule (500 mg total) by mouth daily (Patient taking differently: Take 500 mg by mouth daily On hold), Disp: 90 capsule, Rfl: 3    losartan (COZAAR) 50 mg tablet, Take 1 tablet (50 mg total) by mouth daily, Disp: 90 tablet, Rfl: 1    ondansetron (ZOFRAN) 8 mg tablet, Take 1 tablet (8 mg total) by mouth every 8 (eight) hours as needed for nausea or vomiting, Disp: 30 tablet, Rfl: 1    Probiotic Product (PROBIOTIC DAILY PO), Take 1 capsule by mouth daily, Disp: , Rfl:     prochlorperazine (COMPAZINE) 10 mg tablet, Take 1 tablet (10 mg total) by mouth every 6 (six) hours as needed for nausea or vomiting, Disp: 30 tablet, Rfl: 1    vitamin B-12 (VITAMIN B-12) 1,000 mcg tablet, Take 1 tablet (1,000 mcg total) by mouth daily, Disp: 90 tablet, Rfl: 1    Historical Information   Past Medical History:   Diagnosis Date    Allergic 2022    Allergic to neomiacin and cephalexin    Cataract Nov. 2023    Due to have durgery June 2024    Essential thrombocytosis (HCC)     controlled with medication    Hyperlipidemia     Hypertension     Mass in chest     Nodular goiter     Pneumonia Oct 16, 2023    Also  Feb 2024    Psoriasis      Past Surgical History:   Procedure Laterality Date    APPENDECTOMY      BREAST CYST EXCISION Right  "2009    COLONOSCOPY  10/31/2018    DXA PROCEDURE (HISTORICAL)  2017    MAMMO (HISTORICAL)      8-24-18    MAMMO NEEDLE LOCALIZATION RIGHT (ALL INC) Right 2009    SKIN LESION EXCISION      bridge of nose     Social History   Social History     Tobacco Use   Smoking Status Former    Current packs/day: 1.00    Average packs/day: 1 pack/day for 58.4 years (58.4 ttl pk-yrs)    Types: Cigarettes    Start date:    Smokeless Tobacco Never   Tobacco Comments    Quit        Family History:   Family History   Problem Relation Age of Onset    Stroke Mother     Depression Mother             Hypertension Mother     Hearing loss Mother     Tuberculosis Father     Cancer Brother         lung    Tuberculosis Brother     Coronary artery disease Brother     Hypertension Brother     No Known Problems Daughter     No Known Problems Daughter     No Known Problems Maternal Grandmother     No Known Problems Maternal Grandfather     No Known Problems Paternal Grandmother     No Known Problems Paternal Grandfather     No Known Problems Maternal Aunt     No Known Problems Maternal Aunt     No Known Problems Maternal Aunt     No Known Problems Maternal Aunt     No Known Problems Paternal Aunt     Cancer Brother         Throat cancer                 Diagnostic Data:  Labs:  I personally reviewed the most recent laboratory data pertinent to today's visit    Lab Results   Component Value Date    WBC 5.94 06/10/2024    HGB 9.4 (L) 06/10/2024    HCT 30.2 (L) 06/10/2024     (H) 06/10/2024     06/10/2024     Lab Results   Component Value Date    CALCIUM 8.6 06/10/2024    K 4.0 06/10/2024    CO2 26 06/10/2024     06/10/2024    BUN 14 06/10/2024    CREATININE 0.84 06/10/2024     No results found for: \"IGE\"  Lab Results   Component Value Date    ALT 5 (L) 06/10/2024    AST 8 (L) 06/10/2024    ALKPHOS 57 06/10/2024           I have spent a total time of 20 minutes on 24 in caring for this patient " including Diagnostic results, Prognosis, Risks and benefits of tx options, Instructions for management, Patient and family education, Importance of tx compliance, Risk factor reductions, Impressions, Counseling / Coordination of care, Documenting in the medical record, Reviewing / ordering tests, medicine, procedures  , Obtaining or reviewing history  , and Communicating with other healthcare professionals .   _

## 2024-06-12 ENCOUNTER — APPOINTMENT (OUTPATIENT)
Dept: RADIATION ONCOLOGY | Facility: HOSPITAL | Age: 74
End: 2024-06-12
Attending: INTERNAL MEDICINE
Payer: MEDICARE

## 2024-06-12 PROCEDURE — 77014 CHG CT GUIDANCE RADIATION THERAPY FLDS PLACEMENT: CPT | Performed by: STUDENT IN AN ORGANIZED HEALTH CARE EDUCATION/TRAINING PROGRAM

## 2024-06-12 PROCEDURE — 77386 HB NTSTY MODUL RAD TX DLVR CPLX: CPT | Performed by: STUDENT IN AN ORGANIZED HEALTH CARE EDUCATION/TRAINING PROGRAM

## 2024-06-13 ENCOUNTER — APPOINTMENT (OUTPATIENT)
Dept: RADIATION ONCOLOGY | Facility: HOSPITAL | Age: 74
End: 2024-06-13
Attending: INTERNAL MEDICINE
Payer: MEDICARE

## 2024-06-13 ENCOUNTER — HOSPITAL ENCOUNTER (OUTPATIENT)
Dept: INFUSION CENTER | Facility: CLINIC | Age: 74
Discharge: HOME | End: 2024-06-13
Payer: MEDICARE

## 2024-06-13 VITALS
TEMPERATURE: 97.2 F | BODY MASS INDEX: 19.78 KG/M2 | HEART RATE: 70 BPM | HEIGHT: 62 IN | DIASTOLIC BLOOD PRESSURE: 65 MMHG | OXYGEN SATURATION: 92 % | SYSTOLIC BLOOD PRESSURE: 134 MMHG | WEIGHT: 107.5 LBS

## 2024-06-13 DIAGNOSIS — C34.91 NSCLC OF RIGHT LUNG (HCC): Primary | ICD-10-CM

## 2024-06-13 DIAGNOSIS — C34.11 MALIGNANT NEOPLASM OF UPPER LOBE, RIGHT BRONCHUS OR LUNG (HCC): Primary | ICD-10-CM

## 2024-06-13 PROCEDURE — 77336 RADIATION PHYSICS CONSULT: CPT | Performed by: INTERNAL MEDICINE

## 2024-06-13 PROCEDURE — 96413 CHEMO IV INFUSION 1 HR: CPT

## 2024-06-13 PROCEDURE — 77386 HB NTSTY MODUL RAD TX DLVR CPLX: CPT | Performed by: INTERNAL MEDICINE

## 2024-06-13 PROCEDURE — 96417 CHEMO IV INFUS EACH ADDL SEQ: CPT

## 2024-06-13 PROCEDURE — 77014 CHG CT GUIDANCE RADIATION THERAPY FLDS PLACEMENT: CPT | Performed by: INTERNAL MEDICINE

## 2024-06-13 PROCEDURE — 96367 TX/PROPH/DG ADDL SEQ IV INF: CPT

## 2024-06-13 RX ORDER — SODIUM CHLORIDE 9 MG/ML
20 INJECTION, SOLUTION INTRAVENOUS ONCE
Status: CANCELLED | OUTPATIENT
Start: 2024-06-21

## 2024-06-13 RX ORDER — SODIUM CHLORIDE 9 MG/ML
20 INJECTION, SOLUTION INTRAVENOUS ONCE
Status: COMPLETED | OUTPATIENT
Start: 2024-06-13 | End: 2024-06-13

## 2024-06-13 RX ORDER — SUCRALFATE 1 G/1
1 TABLET ORAL 3 TIMES DAILY
Qty: 30 TABLET | Refills: 1 | Status: ON HOLD | OUTPATIENT
Start: 2024-06-13

## 2024-06-13 RX ADMIN — DEXAMETHASONE SODIUM PHOSPHATE: 10 INJECTION, SOLUTION INTRAMUSCULAR; INTRAVENOUS at 11:17

## 2024-06-13 RX ADMIN — FAMOTIDINE 20 MG: 10 INJECTION INTRAVENOUS at 12:14

## 2024-06-13 RX ADMIN — DIPHENHYDRAMINE HYDROCHLORIDE 25 MG: 50 INJECTION, SOLUTION INTRAMUSCULAR; INTRAVENOUS at 11:48

## 2024-06-13 RX ADMIN — PACLITAXEL 67.2 MG: 6 INJECTION, SOLUTION INTRAVENOUS at 12:41

## 2024-06-13 RX ADMIN — SODIUM CHLORIDE 20 ML/HR: 0.9 INJECTION, SOLUTION INTRAVENOUS at 11:16

## 2024-06-13 RX ADMIN — CARBOPLATIN 144.8 MG: 10 INJECTION, SOLUTION INTRAVENOUS at 14:46

## 2024-06-13 NOTE — PROGRESS NOTES
Patient presents to infusion center for treatment with Taxol and Carboplatin. Patient offers no complaints. VSS. Labs from 6/10/2024 reviewed and within parameters to treat. Peripheral IV inserted without incident.

## 2024-06-13 NOTE — PROGRESS NOTES
Patient with 5 minutes of taxol flush remaining. Patient noticed left arm swelling above insertion site. No redness or warmth noted. IV removed. Ice pack applied. New IV inserted without incident.

## 2024-06-13 NOTE — PROGRESS NOTES
Patient tolerated treatment without incident. Peripheral IV removed. Next appointment confirmed for 6/14/2024 at 1400. AVS offered and declined.

## 2024-06-14 ENCOUNTER — HOSPITAL ENCOUNTER (OUTPATIENT)
Dept: INFUSION CENTER | Facility: CLINIC | Age: 74
End: 2024-06-14
Payer: MEDICARE

## 2024-06-14 ENCOUNTER — APPOINTMENT (OUTPATIENT)
Dept: RADIATION ONCOLOGY | Facility: HOSPITAL | Age: 74
End: 2024-06-14
Attending: INTERNAL MEDICINE
Payer: MEDICARE

## 2024-06-14 VITALS
RESPIRATION RATE: 17 BRPM | OXYGEN SATURATION: 100 % | TEMPERATURE: 97.9 F | HEART RATE: 83 BPM | DIASTOLIC BLOOD PRESSURE: 60 MMHG | SYSTOLIC BLOOD PRESSURE: 129 MMHG

## 2024-06-14 DIAGNOSIS — C34.11 MALIGNANT NEOPLASM OF UPPER LOBE, RIGHT BRONCHUS OR LUNG (HCC): Primary | ICD-10-CM

## 2024-06-14 DIAGNOSIS — E86.0 DEHYDRATION: ICD-10-CM

## 2024-06-14 PROCEDURE — 77014 CHG CT GUIDANCE RADIATION THERAPY FLDS PLACEMENT: CPT | Performed by: STUDENT IN AN ORGANIZED HEALTH CARE EDUCATION/TRAINING PROGRAM

## 2024-06-14 PROCEDURE — 96360 HYDRATION IV INFUSION INIT: CPT

## 2024-06-14 PROCEDURE — 77386 HB NTSTY MODUL RAD TX DLVR CPLX: CPT | Performed by: STUDENT IN AN ORGANIZED HEALTH CARE EDUCATION/TRAINING PROGRAM

## 2024-06-14 RX ADMIN — SODIUM CHLORIDE 1000 ML: 0.9 INJECTION, SOLUTION INTRAVENOUS at 12:01

## 2024-06-14 NOTE — PROGRESS NOTES
HEMATOLOGY / ONCOLOGY CLINIC FOLLOW UP NOTE    Patient Ayla Nye  MRN: [id number]  : 1950  Date of Encounter 2024      Referring Provider:      Reason for Encounter:          Reason for Encounter: follow up ET and new lung mass on CRT C3 of 6 (2024)      C1 2024  C2  2024  C3 2024  C4 2024  C5   2024               Oncology History   Malignant neoplasm of upper lobe, right bronchus or lung (HCC)    Initial Diagnosis    Primary lung adenocarcinoma, right (HCC)     3/26/2024 Biopsy    A-C. Lymph Node, Level 4R :    - Metastatic non-small cell carcinoma, most compatible with a primary lung adenocarcinoma; see note.       D-F. Lymph Node, Level 10R:    - Metastatic non-small cell carcinoma; see note.       G-I. Lymph Node, Level 4L:    - Metastatic non-small cell carcinoma; see note.       4/15/2024 -  Cancer Staged    Staging form: Lung, AJCC 8th Edition  - Clinical stage from 4/15/2024: Stage IIIB (cT2b, cN3, cM0) - Signed by Rogelio Beebe DO on 4/15/2024       2024 -  Chemotherapy    alteplase (CATHFLO), 2 mg, Intracatheter, Every 1 Minute as needed, 4 of 6 cycles  CARBOplatin (PARAPLATIN) IVPB (GOG AUC DOSING), 130.4 mg, Intravenous, Once, 4 of 6 cycles  Administration: 130.4 mg (2024), 144.8 mg (2024), 138.4 mg (2024), 144.8 mg (2024)  PACLItaxel (TAXOL) chemo IVPB, 45 mg/m2 = 67.2 mg (90 % of original dose 50 mg/m2), Intravenous, Once, 4 of 6 cycles  Dose modification: 45 mg/m2 (original dose 50 mg/m2, Cycle 1, Reason: Anticipated Tolerance)  Administration: 67.2 mg (2024), 67.2 mg (2024), 67.2 mg (2024), 67.2 mg (2024)          Discussion      cT2b N3 M0 Stage IIIB adenocarcinoma lung        Caris     Kras C12V, p53  PDL1 TPS 5%  TMB 16 mut/Mb   Other  mutations negative            The optimal therapy of resectable Stage III NSCLC consists of systemic treatment combined with local approach with  radiation and/or surgery.  Strategies include surgery followed by adjuvant chemotherapy  neoadjuvant chemotherapy or chemoimmunotherapy followed by surgery, neoadjuvant chemoradiotherapy followed by surgery or concurrent or sequential chemotherapy with definitive radiation therapy.      The accepted standard remains concurrent chemoradiotherapy although this is being challenged     The potential benefit of adding surgery to combined chemoradiotherapy for stage IIIA disease was noted in the Intergroup 0139 trial.  T1-3N2 NSCLC were assigned to concurrent chemorads 45 Gy with two cycles of Cisplatin/Etoposide followed by either surgery of continued chemorads.  PFS was longer in the surgery arm with no OS difference.      The two chemotherapy regimens that have been most commonly used in the [place] are the combination of cisplatin and etoposide and weekly carboplatin and paclitaxel:     Concurrent cisplatin (50 mg/m2 on days 1, 8, 29, and 36) plus etoposide (50 mg/m2 daily on days 1 to 5, and 29 to 33) with thoracic RT followed by two cycles of cisplatin plus etoposide was evaluated in a multicenter phase II trial of 50 patients with pathologically confirmed stage IIIB disease  With an average follow-up of 52 months, the three- and five-year survival rates were 17 and 15 percent, respectively. Treatment was complicated by grade 4 neutropenia in 32 percent and grade 3 or 4 esophagitis in 20 percent of patients. In a subsequent phase II trial, better results were seen with the same concurrent chemoradiation regimen followed by docetaxel consolidation, but the use of consolidation chemotherapy in this setting has been called into question by subsequent data       In a randomized phase II trial, carboplatin (area under the curve [AUC] = 2) and paclitaxel (45 mg/m2) given weekly with radiotherapy (63 Gy) followed by two cycles of consolidation therapy (carboplatin AUC = 6, paclitaxel 200 mg/m2) resulted in a median  "survival of 16.3 months.         Thus, for chemoradiation, the choices are cisplatin plus etoposide, in conjunction with once daily RT to a total of 60 Gy. An alternative \"radiosensitizing chemotherapy\" approach uses weekly carboplatin plus paclitaxel with approximately 60 Gy of radiation, followed by two cycles of consolidation with this same chemotherapy combination at standard systemic doses. The combination of pemetrexed and platinum agents has emerged as another acceptable alternative for stage III patients with nonsquamous cell histology.      Based on preclinical evidence demonstrating synergy between RT and IO, the Phase III [place] trial used a PDL1 inhibitor Durvalumab after concurrent CRT in a randomized study involving 713 patients.  After median follow up of 34 months giving drug every 2 weeks for 12 months as consolidation, the use of durvalumab significantly improved PFR from 5.6 to 16.9 months and led to a significant improvement in OS with HR 0.72.  The incidence of new mets was also lower including brain mets.      Grade 3 or 4 adverse events occurred in 31 percent of the patients who received durvalumab and 26 percent of those who received placebo, with the most common severe adverse event being pneumonia. Adverse events of any grade that were of special interest, regardless of cause, were reported in 66 percent of patients receiving durvalumab versus 49 percent of those receiving placebo, most of which were grade 1 or 2. Such events for durvalumab versus placebo included diarrhea (18 versus 19 percent), pneumonitis (13 versus 8 percent), rash (12 versus 7 percent), and pruritus (12 versus 5 percent). Adverse events of special interest for which patients received concomitant treatment were reported in 42 versus 17 of patients receiving durvalumab and placebo, respectively, with treatments typically including steroids, endocrine agents, and occasionally other immunosuppressive agents. " Patient-reported quality of life was comparable between the two groups      These results demonstrate efficacy and tolerability of durvalumab for treatment of unresectable stage III NSCLC in patients who experience an objective response or stable disease following completion of chemoradiotherapy.     For this patient weekly Carboplatin AUC 2 and Taxol 45 mg/m2 weekly x 6 weeks with RT to 60-63 Gy will be used.  IO therapy, based on response, will be considered per the [place] trial as above        Assessment/Plan:     Ms. 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  She has been tolerating Hydrea 500 mg once daily Monday through Friday and off on Saturday and Sunday. Patient also with 4.1 cm RUL mass 1.5cm spiculated posterior nodule mediastinal adenopathy, no metastatic disease including brain MRI new diagnosis      ET:      hydroxyurea (HYDREA) 500 mg capsule; continue daily 500 mg 7 days per week; once treatment starts will hold-has been held since 5/23/2024     Plts 498    Plts off hydrea 388 (were 498 then 434)     NSCLC/adenocarcinoma;mutational analysis pending      Now with RUL 4.2 cm mass with mediastinal adenopathy at least cT2a cN2 cM0 lung cancer     cT2b N3 Stage IIIB lung      Please see above regarding discussion      Patient to get C5/6 on the 20 th June 2024     Carboplatin AUC 2 Taxol 45 mg/m2 weekly with RT     Durvalumab adjuvant post treatment depending on response      Doing well, minimal numbness in feet/esophagitis        IgG deficiency     IgG mildly low at 571.  This is not significantly decreased enough to cause the recurrent infections.  Will continue to monitor on subsequent labs.  No need for intervention at this time.     - Comprehensive metabolic panel; Future  - IgG, IgA, IgM; Future      Follow up     2 weeks                   History of present illness:  Initial Visit 4/10/2024     Ayla Nye is a  73-year-old female with past medical history of hypertension, psoriatic arthritis, CKD stage III, hyperlipidemia, and latent TB.  She is seen in follow-up for essential thrombocytosis.  She has had an elevated platelet count dating back to January 2028 all of her other cell lines were within normal limits.  Her highest platelet count was around 700,000.  Myeloproliferative work-up demonstrated positive JAK2 mutation and she was started on 500 mg of Hydrea daily in July 2020.  Due to leukopenia her dose was reduced to Monday, Wednesday, Friday.  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  Then again in July we had to decrease her dose and she is taking 500 mg once daily Monday through Friday and not taking any on Saturday or Sunday.     She has been tolerating the 500 mg of Hydrea Monday through Friday off Saturday and Sunday well without any side effects.  She was seen by my attending and underwent further workup of her recurrent infections and was found to have a mildly low IgG level.   She has had pneumonia twice once in October and then again in February.  She states that she has had multiple imaging of her chest which does suggest scarring which prompted a CT scan of her chest .  .  She is also had recurrent sinus infections.  She is a former smoker and quit 15 years ago bit was 1[[d x more than 30 years.        She does not have any continued symptoms of pneumonia and no cough, shortness of breath, or fevers.       She underwent the following testing; EBUS with pulmonary as well as CT chest/PET scan     Lung mass on CT chest 2/28/2024     LUNGS:     -4.1 x 3.4 cm solid mass in the anterior right upper lobe (series 302, image 73).     -Posterior to this mass, there is a 1.5 x 1.5 cm spiculated nodule (series 302, image 78)      -2.5 x 2.1 cm groundglass nodule in the anterior left upper lobe (series 302, image 90)     Mild emphysema.     Right apical pleural-parenchymal scarring.     PLEURA: Small calcified left posterior pleural plaque, which may be from prior asbestos exposure or the sequela of old inflammation.     HEART/GREAT VESSELS: Normal heart size. Mild-moderate coronary artery calcification. Mild to moderate mitral annular calcification. Trace pericardial effusion. No thoracic aortic aneurysm.     MEDIASTINUM AND SUDEEP: 1.6 x 2.0 cm right lower paratracheal/precarinal lymph node. 1.3 x 1.3 cm right lower paratracheal lymph node.        CT PET  3/28/2024     Intensely FDG avid right upper lobe mass, SUV max of 18. This abuts the anterior pleural margin. Central photopenia suggest necrosis. Mass measures on the order of 4.2 cm in size, stable previously 4.1 cm.     Subjacent 1.5 cm spiculated nodule posteriorly demonstrates minimal uptake, SUV max of 1.7.     Minimal FDG uptake in the left upper lobe groundglass nodule, SUV max of 2.2. This measured up to 2.5 cm in size on recent CT, appears grossly stable on low-dose CT.     A few FDG-avid mediastinal lymph nodes. Right anterior paratracheal lymph node demonstrates SUV max of 13. This measures up to 1.9 cm short axis image 85 series 3, may be slightly larger previously 1.6 cm.     A right perihilar lymph node demonstrates SUV max of 12. This measures on the order of 1.2 cm short axis image 89 series 3.  CT images: Scattered emphysematous changes of the lung fields. Mild to moderate coronary artery calcifications. Minimal left pleural calcification. Small calcified lymph nodes in the left perihilar region        3/26/2024     -C. Lymph Node, Level 4R (ThinPrep, smears and cell block preparations):    - Metastatic non-small cell carcinoma, most compatible with a primary lung adenocarcinoma; see note.    - Satisfactory for evaluation.     D-F. Lymph Node, Level 10R (ThinPrep, smears  and cell block preparations):    - Metastatic non-small cell carcinoma; see note.    - Satisfactory for evaluation.     G-I. Lymph Node, Level 4L (ThinPrep, smears and cell block preparations):    - Metastatic non-small cell carcinoma; see note.    - Satisfactory for evaluation.               Interval History:  6/3/2024     Ms Nye is tolerating treatment fairly well but did have a reaction to Taxol at C2.  She has flushing and chest pain; drug was stopped she was given Benadryl/steroids/fluids and started at slower rate.  She has not had issues with N/V but with constipation.  Her joints are more painful.  She is eating but taste is an issue, probably due to Carboplatin.  She denies any GERD but has increased cough which is from CRT.  She has no SOB.  We discussed Mucinex to help cut the phlegm generated from treatment.  Her weight is 110 pounds which is slightly decreased; her BP is 118/64. She has no other issues                Interval History:  6/17/2024    Doing well; in week 5.  Has esophagitis and carafate slurry  was prescribed; she has not started it.  No N/V, has minimal numbness mostly feet.  Had issue with veins and extravasation fluid left lower arm.  Some reddness, healing, no infection.  Labs ok plts 388 WBC 5.9 Hgb 9.4.  States psoriasis is better.  Her weight is decreased to 104 but she is eating but less.        REVIEW OF SYSTEMS:  As above   Please note that a 14-point review of systems was performed to include Constitutional, HEENT, Respiratory, CVS, GI, , Musculoskeletal, Integumentary, Neurologic, Rheumatologic, Endocrinologic, Psychiatric, Lymphatic, and Hematologic/Oncologic systems were reviewed and are negative unless otherwise stated in HPI. Positive and negative findings pertinent to this evaluation are incorporated into the history of present illness.      ECOG PS: 0    PROBLEM LIST:  Patient Active Problem List   Diagnosis    TB lung, latent    Psoriatic arthritis (HCC)     Essential thrombocytosis (HCC)    HTN (hypertension), benign    Mixed hyperlipidemia    Encounter for monitoring of hydroxyurea therapy    JAK2 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    Age-related osteoporosis without current pathological fracture    Stage 3b chronic kidney disease (CKD) (HCC)    Malignant neoplasm of upper lobe, right bronchus or lung (HCC)    Bilateral leg pain    Insomnia    Moderate protein-calorie malnutrition (HCC)    Dehydration       Past Medical History:   has a past medical history of Allergic (2022), Cataract (Nov. 2023), Essential thrombocytosis (HCC), Hyperlipidemia, Hypertension, Mass in chest, Nodular goiter, Pneumonia (Oct 16, 2023), and Psoriasis.    PAST SURGICAL HISTORY:   has a past surgical history that includes Appendectomy; Mammo needle localization right (all inc) (Right, 07/13/2009); Skin lesion excision; Colonoscopy (10/31/2018); Mammo (historical); DXA procedure(historical) (04/21/2017); and Breast cyst excision (Right, 2009).    CURRENT MEDICATIONS  Current Outpatient Medications   Medication Sig Dispense Refill    amLODIPine (NORVASC) 5 mg tablet Take 5 mg by mouth daily      aspirin 81 MG tablet Take 81 mg by mouth daily       Cholecalciferol (VITAMIN D3) 5000 units TABS 5,000 Units Daily       diphenhydrAMINE (BENADRYL) 25 mg capsule Take 25 mg by mouth every 12 (twelve) hours as needed for itching       Fluocinolone Acetonide Scalp 0.01 % OIL       halobetasol (ULTRAVATE) 0.05 % ointment        hydrocortisone 2.5 % ointment       losartan (COZAAR) 50 mg tablet Take 1 tablet (50 mg total) by mouth daily 90 tablet 1    ondansetron (ZOFRAN) 8 mg tablet Take 1 tablet (8 mg total) by mouth every 8 (eight) hours as needed for nausea or vomiting 30 tablet 1    Probiotic Product (PROBIOTIC DAILY PO) Take 1 capsule by mouth daily      prochlorperazine (COMPAZINE) 10 mg tablet Take 1 tablet (10 mg total) by mouth every 6 (six) hours as needed for nausea or vomiting 30 tablet 1    sucralfate  "(CARAFATE) 1 g tablet Take 1 tablet (1 g total) by mouth 3 (three) times a day Crush 1g tab in 5 mL water, 3x daily as needed 30 tablet 1    vitamin B-12 (VITAMIN B-12) 1,000 mcg tablet Take 1 tablet (1,000 mcg total) by mouth daily 90 tablet 1    hydroxyurea (HYDREA) 500 mg capsule Take 1 capsule (500 mg total) by mouth daily (Patient not taking: Reported on 6/17/2024) 90 capsule 3     No current facility-administered medications for this visit.     [unfilled]    SOCIAL HISTORY:   reports that she has quit smoking. Her smoking use included cigarettes. She started smoking about 58 years ago. She has a 58.5 pack-year smoking history. She has never used smokeless tobacco. She reports that she does not currently use alcohol. She reports that she does not use drugs.     FAMILY HISTORY:  family history includes Cancer in her brother and brother; Coronary artery disease in her brother; Depression in her mother; Hearing loss in her mother; Hypertension in her brother and mother; No Known Problems in her daughter, daughter, maternal aunt, maternal aunt, maternal aunt, maternal aunt, maternal grandfather, maternal grandmother, paternal aunt, paternal grandfather, and paternal grandmother; Stroke in her mother; Tuberculosis in her brother and father.     ALLERGIES:  is allergic to doxycycline, keflex [cephalexin], and neomycin-polymyxin-dexameth.      Physical Exam:  Vital Signs:   Visit Vitals  /60 (BP Location: Left arm, Patient Position: Sitting, Cuff Size: Standard)   Pulse 90   Temp (!) 97.1 °F (36.2 °C) (Temporal)   Resp 15   Ht 5' 2.01\" (1.575 m)   Wt 47.4 kg (104 lb 8 oz)   SpO2 98%   BMI 19.10 kg/m²   OB Status Postmenopausal   Smoking Status Former   BSA 1.45 m²     Body mass index is 19.1 kg/m².  Body surface area is 1.45 meters squared.    GEN: Alert, awake oriented x3, in no acute distress  HEENT- No pallor, icterus, cyanosis, no oral mucosal lesions,   LAD - no palpable cervical, clavicle, axillary, inguinal " LAD  Heart- normal S1 S2, regular rate and rhythm, No murmur, rubs.   Lungs- clear breathing sound bilateral.   Abdomen- soft, Non tender, bowel sounds present  Extremities- No cyanosis, clubbing, edema  Neuro- No focal neurological deficit    Labs:  Lab Results   Component Value Date    WBC 5.94 06/10/2024    HGB 9.4 (L) 06/10/2024    HCT 30.2 (L) 06/10/2024     (H) 06/10/2024     06/10/2024     Lab Results   Component Value Date    SODIUM 136 06/10/2024    K 4.0 06/10/2024     06/10/2024    CO2 26 06/10/2024    AGAP 7 06/10/2024    BUN 14 06/10/2024    CREATININE 0.84 06/10/2024    GLUC 90 06/10/2024    GLUF 95 06/03/2024    CALCIUM 8.6 06/10/2024    AST 8 (L) 06/10/2024    ALT 5 (L) 06/10/2024    ALKPHOS 57 06/10/2024    TP 6.1 (L) 06/10/2024    TBILI 0.37 06/10/2024    EGFR 69 06/10/2024        Latest Reference Range & Units 06/10/24 10:12   Sodium 135 - 147 mmol/L 136   Potassium 3.5 - 5.3 mmol/L 4.0   Chloride 96 - 108 mmol/L 103   Carbon Dioxide 21 - 32 mmol/L 26   ANION GAP 4 - 13 mmol/L 7   BUN 5 - 25 mg/dL 14   Creatinine 0.60 - 1.30 mg/dL 0.84   GLUCOSE 65 - 140 mg/dL 90   Calcium 8.4 - 10.2 mg/dL 8.6   AST 13 - 39 U/L 8 (L)   ALT 7 - 52 U/L 5 (L)   ALK PHOS 34 - 104 U/L 57   Total Protein 6.4 - 8.4 g/dL 6.1 (L)   Albumin 3.5 - 5.0 g/dL 3.5   Total Bilirubin 0.20 - 1.00 mg/dL 0.37   GFR, Calculated ml/min/1.73sq m 69   MAGNESIUM 1.9 - 2.7 mg/dL 1.9   LD (LDH) 140 - 271 U/L 193   WBC 4.31 - 10.16 Thousand/uL 5.94   RBC 3.81 - 5.12 Million/uL 2.95 (L)   Hemoglobin 11.5 - 15.4 g/dL 9.4 (L)   Hematocrit 34.8 - 46.1 % 30.2 (L)   MCV 82 - 98 fL 102 (H)   MCH 26.8 - 34.3 pg 31.9   MCHC 31.4 - 37.4 g/dL 31.1 (L)   RDW 11.6 - 15.1 % 14.8   Platelet Count 149 - 390 Thousands/uL 388   MPV 8.9 - 12.7 fL 9.0   nRBC /100 WBCs 0   Segmented % 43 - 75 % 87 (H)   Lymphocytes % 14 - 44 % 4 (L)   Monocytes % 4 - 12 % 6   Eosinophils % 0 - 6 % 1   Basophils % 0 - 1 % 1   Immature Grans % 0 - 2 % 1    Absolute Immature Grans 0.00 - 0.20 Thousand/uL 0.03   Absolute Neutrophils 1.85 - 7.62 Thousands/µL 5.21   Absolute Lymphocytes 0.60 - 4.47 Thousands/µL 0.24 (L)   Absolute Monocytes 0.17 - 1.22 Thousand/µL 0.36   Absolute Eosinophils 0.00 - 0.61 Thousand/µL 0.06   Absolute Basophils 0.00 - 0.10 Thousands/µL 0.04   (L): Data is abnormally low  (H): Data is abnormally high    I spent 40 minutes on chart review, face to face counseling time, coordination of care and documentation.    Rosalinda Castro MD PhD

## 2024-06-17 ENCOUNTER — OFFICE VISIT (OUTPATIENT)
Dept: HEMATOLOGY ONCOLOGY | Facility: CLINIC | Age: 74
End: 2024-06-17
Payer: MEDICARE

## 2024-06-17 ENCOUNTER — APPOINTMENT (OUTPATIENT)
Dept: RADIATION ONCOLOGY | Facility: HOSPITAL | Age: 74
End: 2024-06-17
Attending: INTERNAL MEDICINE
Payer: MEDICARE

## 2024-06-17 VITALS
RESPIRATION RATE: 15 BRPM | HEIGHT: 62 IN | TEMPERATURE: 97.1 F | DIASTOLIC BLOOD PRESSURE: 60 MMHG | HEART RATE: 90 BPM | OXYGEN SATURATION: 98 % | WEIGHT: 104.5 LBS | BODY MASS INDEX: 19.23 KG/M2 | SYSTOLIC BLOOD PRESSURE: 104 MMHG

## 2024-06-17 DIAGNOSIS — C34.91 NSCLC OF RIGHT LUNG (HCC): Primary | ICD-10-CM

## 2024-06-17 PROCEDURE — 77386 HB NTSTY MODUL RAD TX DLVR CPLX: CPT | Performed by: RADIOLOGY

## 2024-06-17 PROCEDURE — 77014 CHG CT GUIDANCE RADIATION THERAPY FLDS PLACEMENT: CPT | Performed by: RADIOLOGY

## 2024-06-17 PROCEDURE — 99215 OFFICE O/P EST HI 40 MIN: CPT | Performed by: INTERNAL MEDICINE

## 2024-06-18 ENCOUNTER — APPOINTMENT (OUTPATIENT)
Dept: LAB | Facility: CLINIC | Age: 74
End: 2024-06-18
Payer: MEDICARE

## 2024-06-18 ENCOUNTER — APPOINTMENT (OUTPATIENT)
Dept: RADIATION ONCOLOGY | Facility: HOSPITAL | Age: 74
End: 2024-06-18
Attending: INTERNAL MEDICINE
Payer: MEDICARE

## 2024-06-18 DIAGNOSIS — C34.11 MALIGNANT NEOPLASM OF UPPER LOBE, RIGHT BRONCHUS OR LUNG (HCC): ICD-10-CM

## 2024-06-18 LAB
ALBUMIN SERPL BCP-MCNC: 3.7 G/DL (ref 3.5–5)
ALP SERPL-CCNC: 63 U/L (ref 34–104)
ALT SERPL W P-5'-P-CCNC: 5 U/L (ref 7–52)
ANION GAP SERPL CALCULATED.3IONS-SCNC: 6 MMOL/L (ref 4–13)
AST SERPL W P-5'-P-CCNC: 9 U/L (ref 13–39)
BASOPHILS # BLD AUTO: 0.04 THOUSANDS/ÂΜL (ref 0–0.1)
BASOPHILS NFR BLD AUTO: 1 % (ref 0–1)
BILIRUB SERPL-MCNC: 0.35 MG/DL (ref 0.2–1)
BUN SERPL-MCNC: 18 MG/DL (ref 5–25)
CALCIUM SERPL-MCNC: 8.8 MG/DL (ref 8.4–10.2)
CHLORIDE SERPL-SCNC: 102 MMOL/L (ref 96–108)
CO2 SERPL-SCNC: 28 MMOL/L (ref 21–32)
CREAT SERPL-MCNC: 0.97 MG/DL (ref 0.6–1.3)
EOSINOPHIL # BLD AUTO: 0.05 THOUSAND/ÂΜL (ref 0–0.61)
EOSINOPHIL NFR BLD AUTO: 1 % (ref 0–6)
ERYTHROCYTE [DISTWIDTH] IN BLOOD BY AUTOMATED COUNT: 15.1 % (ref 11.6–15.1)
GFR SERPL CREATININE-BSD FRML MDRD: 58 ML/MIN/1.73SQ M
GLUCOSE SERPL-MCNC: 81 MG/DL (ref 65–140)
HCT VFR BLD AUTO: 29.8 % (ref 34.8–46.1)
HGB BLD-MCNC: 9.1 G/DL (ref 11.5–15.4)
IMM GRANULOCYTES # BLD AUTO: 0.04 THOUSAND/UL (ref 0–0.2)
IMM GRANULOCYTES NFR BLD AUTO: 1 % (ref 0–2)
LDH SERPL-CCNC: 178 U/L (ref 140–271)
LYMPHOCYTES # BLD AUTO: 0.4 THOUSANDS/ÂΜL (ref 0.6–4.47)
LYMPHOCYTES NFR BLD AUTO: 9 % (ref 14–44)
MAGNESIUM SERPL-MCNC: 2 MG/DL (ref 1.9–2.7)
MCH RBC QN AUTO: 31 PG (ref 26.8–34.3)
MCHC RBC AUTO-ENTMCNC: 30.5 G/DL (ref 31.4–37.4)
MCV RBC AUTO: 101 FL (ref 82–98)
MONOCYTES # BLD AUTO: 0.34 THOUSAND/ÂΜL (ref 0.17–1.22)
MONOCYTES NFR BLD AUTO: 8 % (ref 4–12)
NEUTROPHILS # BLD AUTO: 3.42 THOUSANDS/ÂΜL (ref 1.85–7.62)
NEUTS SEG NFR BLD AUTO: 80 % (ref 43–75)
NRBC BLD AUTO-RTO: 0 /100 WBCS
PLATELET # BLD AUTO: 278 THOUSANDS/UL (ref 149–390)
PMV BLD AUTO: 9 FL (ref 8.9–12.7)
POTASSIUM SERPL-SCNC: 4.6 MMOL/L (ref 3.5–5.3)
PROT SERPL-MCNC: 6.3 G/DL (ref 6.4–8.4)
RBC # BLD AUTO: 2.94 MILLION/UL (ref 3.81–5.12)
SODIUM SERPL-SCNC: 136 MMOL/L (ref 135–147)
WBC # BLD AUTO: 4.29 THOUSAND/UL (ref 4.31–10.16)

## 2024-06-18 PROCEDURE — 83735 ASSAY OF MAGNESIUM: CPT

## 2024-06-18 PROCEDURE — 83615 LACTATE (LD) (LDH) ENZYME: CPT

## 2024-06-18 PROCEDURE — 80053 COMPREHEN METABOLIC PANEL: CPT

## 2024-06-18 PROCEDURE — 36415 COLL VENOUS BLD VENIPUNCTURE: CPT

## 2024-06-18 PROCEDURE — 77386 HB NTSTY MODUL RAD TX DLVR CPLX: CPT | Performed by: RADIOLOGY

## 2024-06-18 PROCEDURE — 77014 CHG CT GUIDANCE RADIATION THERAPY FLDS PLACEMENT: CPT | Performed by: RADIOLOGY

## 2024-06-18 PROCEDURE — 85025 COMPLETE CBC W/AUTO DIFF WBC: CPT

## 2024-06-19 ENCOUNTER — APPOINTMENT (OUTPATIENT)
Dept: RADIATION ONCOLOGY | Facility: HOSPITAL | Age: 74
End: 2024-06-19
Attending: INTERNAL MEDICINE
Payer: MEDICARE

## 2024-06-19 PROCEDURE — 77014 CHG CT GUIDANCE RADIATION THERAPY FLDS PLACEMENT: CPT | Performed by: RADIOLOGY

## 2024-06-19 PROCEDURE — 77386 HB NTSTY MODUL RAD TX DLVR CPLX: CPT | Performed by: RADIOLOGY

## 2024-06-20 ENCOUNTER — APPOINTMENT (OUTPATIENT)
Dept: RADIATION ONCOLOGY | Facility: HOSPITAL | Age: 74
End: 2024-06-20
Attending: INTERNAL MEDICINE
Payer: MEDICARE

## 2024-06-20 ENCOUNTER — APPOINTMENT (OUTPATIENT)
Dept: RADIATION ONCOLOGY | Facility: HOSPITAL | Age: 74
End: 2024-06-20
Payer: MEDICARE

## 2024-06-20 PROCEDURE — 77336 RADIATION PHYSICS CONSULT: CPT | Performed by: RADIOLOGY

## 2024-06-20 PROCEDURE — 77014 CHG CT GUIDANCE RADIATION THERAPY FLDS PLACEMENT: CPT | Performed by: RADIOLOGY

## 2024-06-20 PROCEDURE — 77386 HB NTSTY MODUL RAD TX DLVR CPLX: CPT | Performed by: RADIOLOGY

## 2024-06-21 ENCOUNTER — HOSPITAL ENCOUNTER (OUTPATIENT)
Dept: INFUSION CENTER | Facility: CLINIC | Age: 74
DRG: 864 | End: 2024-06-21
Payer: MEDICARE

## 2024-06-21 ENCOUNTER — APPOINTMENT (OUTPATIENT)
Dept: RADIATION ONCOLOGY | Facility: HOSPITAL | Age: 74
End: 2024-06-21
Attending: INTERNAL MEDICINE
Payer: MEDICARE

## 2024-06-21 ENCOUNTER — PATIENT OUTREACH (OUTPATIENT)
Dept: HEMATOLOGY ONCOLOGY | Facility: CLINIC | Age: 74
End: 2024-06-21

## 2024-06-21 ENCOUNTER — APPOINTMENT (OUTPATIENT)
Dept: RADIATION ONCOLOGY | Facility: HOSPITAL | Age: 74
End: 2024-06-21
Payer: MEDICARE

## 2024-06-21 VITALS
HEART RATE: 75 BPM | OXYGEN SATURATION: 100 % | SYSTOLIC BLOOD PRESSURE: 108 MMHG | DIASTOLIC BLOOD PRESSURE: 71 MMHG | WEIGHT: 105 LBS | HEIGHT: 62 IN | TEMPERATURE: 98 F | BODY MASS INDEX: 19.32 KG/M2

## 2024-06-21 DIAGNOSIS — C34.11 MALIGNANT NEOPLASM OF UPPER LOBE, RIGHT BRONCHUS OR LUNG (HCC): Primary | ICD-10-CM

## 2024-06-21 PROCEDURE — 96367 TX/PROPH/DG ADDL SEQ IV INF: CPT

## 2024-06-21 PROCEDURE — 96413 CHEMO IV INFUSION 1 HR: CPT

## 2024-06-21 PROCEDURE — 77014 CHG CT GUIDANCE RADIATION THERAPY FLDS PLACEMENT: CPT | Performed by: RADIOLOGY

## 2024-06-21 PROCEDURE — 77427 RADIATION TX MANAGEMENT X5: CPT | Performed by: INTERNAL MEDICINE

## 2024-06-21 PROCEDURE — 77386 HB NTSTY MODUL RAD TX DLVR CPLX: CPT | Performed by: RADIOLOGY

## 2024-06-21 PROCEDURE — 96417 CHEMO IV INFUS EACH ADDL SEQ: CPT

## 2024-06-21 RX ORDER — SODIUM CHLORIDE 9 MG/ML
20 INJECTION, SOLUTION INTRAVENOUS ONCE
OUTPATIENT
Start: 2024-07-02

## 2024-06-21 RX ORDER — SODIUM CHLORIDE 9 MG/ML
20 INJECTION, SOLUTION INTRAVENOUS ONCE
Status: COMPLETED | OUTPATIENT
Start: 2024-06-21 | End: 2024-06-21

## 2024-06-21 RX ADMIN — FAMOTIDINE 20 MG: 10 INJECTION INTRAVENOUS at 12:14

## 2024-06-21 RX ADMIN — CARBOPLATIN 132 MG: 600 INJECTION, SOLUTION INTRAVENOUS at 14:37

## 2024-06-21 RX ADMIN — SODIUM CHLORIDE 20 ML/HR: 0.9 INJECTION, SOLUTION INTRAVENOUS at 11:53

## 2024-06-21 RX ADMIN — PACLITAXEL 67.2 MG: 6 INJECTION, SOLUTION INTRAVENOUS at 13:13

## 2024-06-21 RX ADMIN — DIPHENHYDRAMINE HYDROCHLORIDE 25 MG: 50 INJECTION, SOLUTION INTRAMUSCULAR; INTRAVENOUS at 12:34

## 2024-06-21 RX ADMIN — DEXAMETHASONE SODIUM PHOSPHATE: 100 INJECTION INTRAMUSCULAR; INTRAVENOUS at 11:53

## 2024-06-21 NOTE — PATIENT INSTRUCTIONS
June 2024 Sunday Monday Tuesday Wednesday Thursday Friday Saturday                                 1                2     3    FOLLOW UP PG   8:25 AM   (20 min.)   Rosalinda Castro MD   [facility] [place]    LAB WALK IN   9:15 AM   (5 min.)   AN MOB PHLEB CHAIR 1   [facility]    RADONC DAILY TREATMENT PG   9:45 AM   (15 min.)   AN RAD ONC RES   [facility] 4    RADONC DAILY TREATMENT PG  11:15 AM   (15 min.)   AN RAD ONC RES   [facility] 5    RADONC DAILY TREATMENT PG  11:15 AM   (15 min.)   AN RAD ONC RES   [facility] 6    RADONC DAILY TREATMENT PG   8:15 AM   (15 min.)   AN RAD ONC RES   [facility]    RADONC ON TREAT VISIT PG   8:30 AM   (15 min.)   Honey Gamboa MD   [facility]    INF ONCOLOGY TX-TREATMENT PLAN   9:30 AM   (330 min.)   [place] IN[place] CHAIR 3   [facility] 7    RADONC DAILY TREATMENT PG   9:00 AM   (15 min.)   AN RAD ONC RES   [facility]    INF THERAPY PLAN  10:00 AM   (240 min.)   [place] CHAIR 3   [facility] 8           Cycle 3, Day 1  + Add'l treatments     9     10    LAB WALK IN  10:10 AM   (5 min.)   AN MOB LAB PSC CHAIR   [facility]    RADONC DAILY TREATMENT PG  11:15 AM   (15 min.)   AN RAD ONC RES   [facility] 11    FOLLOW UP PG   7:45 AM   (20 min.)   Rogelio Crisostomo MD   [facility] [place]    RADONC DAILY TREATMENT PG   9:15 AM   (15 min.)   AN RAD ONC RES   [facility] 12    RADONC DAILY TREATMENT PG  11:15 AM   (15 min.)   AN RAD ONC RES   [facility] 13    RADONC ON  TREAT VISIT PG   9:45 AM   (15 min.)   Honey Gamboa MD   [facility]    RADONC DAILY TREATMENT PG   9:45 AM   (15 min.)   AN RAD ONC RES   [facility]    INF ONCOLOGY TX-TREATMENT PLAN  10:30 AM   (270 min.)   AN INF BED 1   [facility] 14    RADONC DAILY TREATMENT PG  11:15 AM   (15 min.)   AN RAD ONC RES   [facility]    INF THERAPY PLAN  12:00 PM   (120 min.)   AN INF CHAIR 10   [facility] 15           Cycle 4, Day 1     16     17    OFFICE VISIT SHORT PG   7:45 AM   (20 min.)   Rosalinda Castro MD   [facility] [place]    RADONC DAILY TREATMENT PG  11:15 AM   (15 min.)   AN RAD ONC RES   [facility] 18    LAB WALK IN  10:10 AM   (5 min.)   AN MOB PHLEB CHAIR 4   [facility]    RADONC DAILY TREATMENT PG  11:15 AM   (15 min.)   AN RAD ONC RES   [facility] 19    RADONC DAILY TREATMENT PG  11:15 AM   (15 min.)   AN RAD ONC RES   [facility] 20    RADONC DAILY TREATMENT PG  11:15 AM   (15 min.)   AN RAD ONC RES   [facility] 21    RADONC ON TREAT VISIT PG  11:00 AM   (15 min.)   Artis Broderick MD   [facility]    RADONC DAILY TREATMENT PG  11:00 AM   (15 min.)   AN RAD ONC RES   [facility]    INF ONCOLOGY TX-TREATMENT PLAN  11:30 AM   (270 min.)   AN INF CHAIR 12   [facility] 22    INF THERAPY PLAN   8:00 AM   (120 min.)   AN INF CHAIR 3   [facility]        Cycle 5, Day 1  + Add'l treatments    23     24    RADONC DAILY TREATMENT PG  11:15 AM   (15 min.)   AN RAD ONC RES   [facility]  [facility]    OFFICE VISIT SHORT PG   2:25 PM   (20 min.)   Rosalinda Castro MD   [facility] [place] 25    RADONC DAILY TREATMENT PG  11:15 AM   (15 min.)   AN RAD ONC RES   [facility] 26    RADONC DAILY TREATMENT PG  11:15 AM   (15 min.)   AN RAD ONC RES   [facility] 27    RADONC DAILY TREATMENT PG  11:00 AM   (15 min.)   AN RAD ONC RES   [facility]    INF ONCOLOGY TX-TREATMENT PLAN  11:30 AM   (270 min.)   AN INF CHAIR 12   [facility] 28    RADONC DAILY TREATMENT PG  11:15 AM   (15 min.)   AN RAD ONC RES   [facility] 29           Cycle 6, Day 1  + Add'l treatments     30                                                     Treatment Details         6/6/2024 - Cycle 3, Day 1 + Additional treatments      Chemotherapy: ONCBCN PROVIDER COMMUNICATION2, PACLITAXEL IVPB, ONCBCN PROVIDER COMMUNICATION2, CARBOPLATIN IVPB (GOG AUC DOSING)    6/13/2024 - Cycle 4, Day 1      Chemotherapy: ONCBCN PROVIDER COMMUNICATION2, PACLITAXEL IVPB, ONCBCN PROVIDER COMMUNICATION2, CARBOPLATIN IVPB (GOG AUC DOSING)    6/21/2024 - Cycle 5, Day 1 + Additional treatments      Chemotherapy: ONCBCN PROVIDER COMMUNICATION2, PACLITAXEL IVPB, ONCBCN PROVIDER COMMUNICATION2, CARBOPLATIN IVPB (GOG AUC DOSING)    6/27/2024 - Cycle 6, Day 1 + Additional treatments      Chemotherapy: ONCBCN PROVIDER COMMUNICATION2, PACLITAXEL IVPB, ONCBCN PROVIDER COMMUNICATION2, CARBOPLATIN IVPB (GOG AUC DOSING)        July 2024 Sunday Monday Tuesday Wednesday Thursday Friday Saturday        1    RADONC DAILY TREATMENT PG  11:15 AM   (15 min.)   AN RAD ONC RES   [facility]    OFFICE VISIT SHORT PG  11:45 AM   (20 min.)   Rosalinda Castro MD   [facility] [place]  2    RADONC DAILY TREATMENT PG  11:15 AM   (15 min.)   AN RAD ONC RES   [facility] 3    RADONC DAILY TREATMENT PG  11:15 AM   (15 min.)   AN RAD ONC RES   [facility] 4     5    RADONC DAILY TREATMENT PG  11:15 AM   (10 min.)   AN RAD ONC RES   [facility] 6                7     8     9     10     11     12     13                14     15     16     17     18     19     20                21     22     23     24     25     26     27                28     29     30     31

## 2024-06-21 NOTE — PROGRESS NOTES
Patient tolerated treatment today without incident and was discharged post, no c/o offered, she will rTO tomorrow for hydration only.

## 2024-06-22 ENCOUNTER — HOSPITAL ENCOUNTER (INPATIENT)
Facility: HOSPITAL | Age: 74
LOS: 2 days | Discharge: HOME | DRG: 864 | End: 2024-06-24
Attending: EMERGENCY MEDICINE | Admitting: INTERNAL MEDICINE
Payer: MEDICARE

## 2024-06-22 ENCOUNTER — TELEPHONE (OUTPATIENT)
Dept: INFUSION CENTER | Facility: CLINIC | Age: 74
End: 2024-06-22

## 2024-06-22 ENCOUNTER — APPOINTMENT (EMERGENCY)
Dept: RADIOLOGY | Facility: HOSPITAL | Age: 74
DRG: 864 | End: 2024-06-22
Payer: MEDICARE

## 2024-06-22 ENCOUNTER — HOSPITAL ENCOUNTER (OUTPATIENT)
Dept: INFUSION CENTER | Facility: CLINIC | Age: 74
Discharge: HOME | End: 2024-06-22

## 2024-06-22 DIAGNOSIS — D64.9 ANEMIA: ICD-10-CM

## 2024-06-22 DIAGNOSIS — D72.819 LEUKOPENIA: ICD-10-CM

## 2024-06-22 DIAGNOSIS — R50.9 FEVER: Primary | ICD-10-CM

## 2024-06-22 DIAGNOSIS — R65.20 SEVERE SEPSIS (HCC): ICD-10-CM

## 2024-06-22 DIAGNOSIS — A41.9 SEVERE SEPSIS (HCC): ICD-10-CM

## 2024-06-22 PROBLEM — D70.9 NEUTROPENIC FEVER  (HCC): Status: ACTIVE | Noted: 2024-06-22

## 2024-06-22 PROBLEM — R50.81 NEUTROPENIC FEVER  (HCC): Status: ACTIVE | Noted: 2024-06-22

## 2024-06-22 LAB
ALBUMIN SERPL BCG-MCNC: 3.6 G/DL (ref 3.5–5)
ALP SERPL-CCNC: 63 U/L (ref 34–104)
ALT SERPL W P-5'-P-CCNC: 5 U/L (ref 7–52)
ANION GAP SERPL CALCULATED.3IONS-SCNC: 8 MMOL/L (ref 4–13)
APTT PPP: 25 SECONDS (ref 23–37)
AST SERPL W P-5'-P-CCNC: 8 U/L (ref 13–39)
BACTERIA UR QL AUTO: ABNORMAL /HPF
BASOPHILS # BLD MANUAL: 0.04 THOUSAND/UL (ref 0–0.1)
BASOPHILS NFR MAR MANUAL: 1 % (ref 0–1)
BILIRUB SERPL-MCNC: 0.35 MG/DL (ref 0.2–1)
BILIRUB UR QL STRIP: NEGATIVE
BUN SERPL-MCNC: 13 MG/DL (ref 5–25)
CALCIUM SERPL-MCNC: 8.9 MG/DL (ref 8.4–10.2)
CHLORIDE SERPL-SCNC: 105 MMOL/L (ref 96–108)
CLARITY UR: CLEAR
CO2 SERPL-SCNC: 22 MMOL/L (ref 21–32)
COLOR UR: ABNORMAL
CREAT SERPL-MCNC: 0.9 MG/DL (ref 0.6–1.3)
EOSINOPHIL # BLD MANUAL: 0 THOUSAND/UL (ref 0–0.4)
EOSINOPHIL NFR BLD MANUAL: 0 % (ref 0–6)
ERYTHROCYTE [DISTWIDTH] IN BLOOD BY AUTOMATED COUNT: 15.1 % (ref 11.6–15.1)
FLUAV RNA RESP QL NAA+PROBE: NEGATIVE
FLUBV RNA RESP QL NAA+PROBE: NEGATIVE
GFR SERPL CREATININE-BSD FRML MDRD: 63 ML/MIN/1.73SQ M
GLUCOSE SERPL-MCNC: 118 MG/DL (ref 65–140)
GLUCOSE UR STRIP-MCNC: NEGATIVE MG/DL
HCT VFR BLD AUTO: 27.4 % (ref 34.8–46.1)
HGB BLD-MCNC: 8.6 G/DL (ref 11.5–15.4)
HGB UR QL STRIP.AUTO: ABNORMAL
INR PPP: 0.95 (ref 0.84–1.19)
KETONES UR STRIP-MCNC: NEGATIVE MG/DL
LACTATE SERPL-SCNC: 2.1 MMOL/L (ref 0.5–2)
LACTATE SERPL-SCNC: 2.2 MMOL/L (ref 0.5–2)
LEUKOCYTE ESTERASE UR QL STRIP: NEGATIVE
LYMPHOCYTES # BLD AUTO: 0.04 THOUSAND/UL (ref 0.6–4.47)
LYMPHOCYTES # BLD AUTO: 1 % (ref 14–44)
MCH RBC QN AUTO: 31.4 PG (ref 26.8–34.3)
MCHC RBC AUTO-ENTMCNC: 31.4 G/DL (ref 31.4–37.4)
MCV RBC AUTO: 100 FL (ref 82–98)
MONOCYTES # BLD AUTO: 0.04 THOUSAND/UL (ref 0–1.22)
MONOCYTES NFR BLD: 1 % (ref 4–12)
MUCOUS THREADS UR QL AUTO: ABNORMAL
NEUTROPHILS # BLD MANUAL: 3.63 THOUSAND/UL (ref 1.85–7.62)
NEUTS BAND NFR BLD MANUAL: 1 % (ref 0–8)
NEUTS SEG NFR BLD AUTO: 96 % (ref 43–75)
NITRITE UR QL STRIP: NEGATIVE
NON-SQ EPI CELLS URNS QL MICRO: ABNORMAL /HPF
PH UR STRIP.AUTO: 5.5 [PH]
PLATELET # BLD AUTO: 211 THOUSANDS/UL (ref 149–390)
PLATELET BLD QL SMEAR: ADEQUATE
PMV BLD AUTO: 9.3 FL (ref 8.9–12.7)
POTASSIUM SERPL-SCNC: 4.2 MMOL/L (ref 3.5–5.3)
PROCALCITONIN SERPL-MCNC: 0.08 NG/ML
PROT SERPL-MCNC: 6.2 G/DL (ref 6.4–8.4)
PROT UR STRIP-MCNC: NEGATIVE MG/DL
PROTHROMBIN TIME: 13.2 SECONDS (ref 11.6–14.5)
RBC # BLD AUTO: 2.74 MILLION/UL (ref 3.81–5.12)
RBC #/AREA URNS AUTO: ABNORMAL /HPF
RBC MORPH BLD: NORMAL
RSV RNA RESP QL NAA+PROBE: NEGATIVE
SARS-COV-2 RNA RESP QL NAA+PROBE: NEGATIVE
SODIUM SERPL-SCNC: 135 MMOL/L (ref 135–147)
SP GR UR STRIP.AUTO: 1.02 (ref 1–1.03)
UROBILINOGEN UR STRIP-ACNC: <2 MG/DL
WBC # BLD AUTO: 3.74 THOUSAND/UL (ref 4.31–10.16)
WBC #/AREA URNS AUTO: ABNORMAL /HPF

## 2024-06-22 PROCEDURE — 96368 THER/DIAG CONCURRENT INF: CPT

## 2024-06-22 PROCEDURE — 99284 EMERGENCY DEPT VISIT MOD MDM: CPT

## 2024-06-22 PROCEDURE — 99223 1ST HOSP IP/OBS HIGH 75: CPT | Performed by: INTERNAL MEDICINE

## 2024-06-22 PROCEDURE — 85730 THROMBOPLASTIN TIME PARTIAL: CPT

## 2024-06-22 PROCEDURE — 83605 ASSAY OF LACTIC ACID: CPT

## 2024-06-22 PROCEDURE — 96365 THER/PROPH/DIAG IV INF INIT: CPT

## 2024-06-22 PROCEDURE — 96366 THER/PROPH/DIAG IV INF ADDON: CPT

## 2024-06-22 PROCEDURE — 99285 EMERGENCY DEPT VISIT HI MDM: CPT | Performed by: EMERGENCY MEDICINE

## 2024-06-22 PROCEDURE — 80053 COMPREHEN METABOLIC PANEL: CPT

## 2024-06-22 PROCEDURE — 85027 COMPLETE CBC AUTOMATED: CPT

## 2024-06-22 PROCEDURE — 71045 X-RAY EXAM CHEST 1 VIEW: CPT

## 2024-06-22 PROCEDURE — 81001 URINALYSIS AUTO W/SCOPE: CPT

## 2024-06-22 PROCEDURE — 84145 PROCALCITONIN (PCT): CPT

## 2024-06-22 PROCEDURE — 85007 BL SMEAR W/DIFF WBC COUNT: CPT

## 2024-06-22 PROCEDURE — 36415 COLL VENOUS BLD VENIPUNCTURE: CPT

## 2024-06-22 PROCEDURE — 0241U HB NFCT DS VIR RESP RNA 4 TRGT: CPT

## 2024-06-22 PROCEDURE — 93005 ELECTROCARDIOGRAM TRACING: CPT

## 2024-06-22 PROCEDURE — 87040 BLOOD CULTURE FOR BACTERIA: CPT

## 2024-06-22 PROCEDURE — 85610 PROTHROMBIN TIME: CPT

## 2024-06-22 RX ORDER — ONDANSETRON 4 MG/1
4 TABLET, ORALLY DISINTEGRATING ORAL EVERY 8 HOURS PRN
Status: CANCELLED | OUTPATIENT
Start: 2024-06-22

## 2024-06-22 RX ORDER — ACETAMINOPHEN 325 MG/1
650 TABLET ORAL EVERY 6 HOURS PRN
Status: DISCONTINUED | OUTPATIENT
Start: 2024-06-22 | End: 2024-06-24 | Stop reason: HOSPADM

## 2024-06-22 RX ORDER — SUCRALFATE 1 G/1
1 TABLET ORAL 3 TIMES DAILY
Status: DISCONTINUED | OUTPATIENT
Start: 2024-06-22 | End: 2024-06-24 | Stop reason: HOSPADM

## 2024-06-22 RX ORDER — ZOLPIDEM TARTRATE 5 MG/1
2.5 TABLET ORAL
Status: DISCONTINUED | OUTPATIENT
Start: 2024-06-22 | End: 2024-06-24 | Stop reason: HOSPADM

## 2024-06-22 RX ORDER — ASPIRIN 81 MG/1
81 TABLET, CHEWABLE ORAL DAILY
Status: DISCONTINUED | OUTPATIENT
Start: 2024-06-22 | End: 2024-06-24 | Stop reason: HOSPADM

## 2024-06-22 RX ORDER — ACETAMINOPHEN 325 MG/1
975 TABLET ORAL ONCE
Status: COMPLETED | OUTPATIENT
Start: 2024-06-22 | End: 2024-06-22

## 2024-06-22 RX ORDER — SACCHAROMYCES BOULARDII 250 MG
250 CAPSULE ORAL 2 TIMES DAILY
Status: DISCONTINUED | OUTPATIENT
Start: 2024-06-22 | End: 2024-06-24 | Stop reason: HOSPADM

## 2024-06-22 RX ORDER — SODIUM CHLORIDE, SODIUM LACTATE, POTASSIUM CHLORIDE, CALCIUM CHLORIDE 600; 310; 30; 20 MG/100ML; MG/100ML; MG/100ML; MG/100ML
50 INJECTION, SOLUTION INTRAVENOUS CONTINUOUS
Status: DISCONTINUED | OUTPATIENT
Start: 2024-06-22 | End: 2024-06-24 | Stop reason: HOSPADM

## 2024-06-22 RX ORDER — DIPHENHYDRAMINE HCL 25 MG
25 TABLET ORAL EVERY 12 HOURS PRN
Status: DISCONTINUED | OUTPATIENT
Start: 2024-06-22 | End: 2024-06-24 | Stop reason: HOSPADM

## 2024-06-22 RX ORDER — ENOXAPARIN SODIUM 100 MG/ML
40 INJECTION SUBCUTANEOUS DAILY
Status: DISCONTINUED | OUTPATIENT
Start: 2024-06-22 | End: 2024-06-24 | Stop reason: HOSPADM

## 2024-06-22 RX ADMIN — SODIUM CHLORIDE, SODIUM LACTATE, POTASSIUM CHLORIDE, AND CALCIUM CHLORIDE 50 ML/HR: .6; .31; .03; .02 INJECTION, SOLUTION INTRAVENOUS at 13:48

## 2024-06-22 RX ADMIN — PIPERACILLIN SODIUM AND TAZOBACTAM SODIUM 4.5 G: 36; 4.5 INJECTION, POWDER, LYOPHILIZED, FOR SOLUTION INTRAVENOUS at 04:09

## 2024-06-22 RX ADMIN — ENOXAPARIN SODIUM 40 MG: 40 INJECTION SUBCUTANEOUS at 08:26

## 2024-06-22 RX ADMIN — ASPIRIN 81 MG 81 MG: 81 TABLET ORAL at 08:26

## 2024-06-22 RX ADMIN — Medication 5000 UNITS: at 08:26

## 2024-06-22 RX ADMIN — SUCRALFATE 1 G: 1 TABLET ORAL at 08:26

## 2024-06-22 RX ADMIN — SODIUM CHLORIDE, SODIUM LACTATE, POTASSIUM CHLORIDE, AND CALCIUM CHLORIDE 1000 ML: .6; .31; .03; .02 INJECTION, SOLUTION INTRAVENOUS at 03:16

## 2024-06-22 RX ADMIN — PIPERACILLIN SODIUM AND TAZOBACTAM SODIUM 4.5 G: 36; 4.5 INJECTION, POWDER, LYOPHILIZED, FOR SOLUTION INTRAVENOUS at 08:34

## 2024-06-22 RX ADMIN — PIPERACILLIN SODIUM AND TAZOBACTAM SODIUM 4.5 G: 36; 4.5 INJECTION, POWDER, LYOPHILIZED, FOR SOLUTION INTRAVENOUS at 18:18

## 2024-06-22 RX ADMIN — Medication 1000 MCG: at 09:57

## 2024-06-22 RX ADMIN — SUCRALFATE 1 G: 1 TABLET ORAL at 15:45

## 2024-06-22 RX ADMIN — SODIUM CHLORIDE, SODIUM LACTATE, POTASSIUM CHLORIDE, AND CALCIUM CHLORIDE 500 ML: .6; .31; .03; .02 INJECTION, SOLUTION INTRAVENOUS at 07:05

## 2024-06-22 RX ADMIN — ACETAMINOPHEN 975 MG: 325 TABLET, FILM COATED ORAL at 03:15

## 2024-06-22 RX ADMIN — DIPHENHYDRAMINE HYDROCHLORIDE 25 MG: 25 TABLET ORAL at 21:51

## 2024-06-22 NOTE — ASSESSMENT & PLAN NOTE
Patient receiving chemotherapy for R lung cancer.  See sepsis for further plan.   WBC 3.7, ANC 3.4

## 2024-06-22 NOTE — TELEPHONE ENCOUNTER
Patient scheduled for hydration today 6/22/2024. Currently in ED awaiting admission for fever. Appt canceled at this time.

## 2024-06-22 NOTE — ASSESSMENT & PLAN NOTE
POA to ED due to fever noticed in the middle of the night  Pt express no complains  BP soft 110/60  On physical exam mild erythema noticed on the inner left jerome, non-tender, not warm to touch, was present since the last week after previous  chemo infusion and currently resolving  Meets sepsis criteria with WBC < 4, fever 102.2 and tachycardia 110. Lactic acid 2.1  CXR: RUL opacification due to mass/ post radiation scarring  S/p 1 L bolus and zosyn for 1 dose in the ED    At the moment of my evaluation my impression that current pt's state is more reactive to recent chemo than true sepsis or neutropenic fever however in setting of suppressed immune system pt would benefit from coverage with broad spectrum Abx and complete work up    Plan:  Tylenol for fever  Monitor vitals per unit routine  Trend CBC, fever curve  Follow blood culture  Give additional 500 cc of LR bolus followed by 55 cc h maintanance IVF for 10 h  C/w Zosyn  Consider CT CAP if new fever episode occur  Consider antifungal coverage if fever persists after 4-7 days

## 2024-06-22 NOTE — ASSESSMENT & PLAN NOTE
Diagnosed in march 2024  [facility] of the RUL lung cancer  Currently is being treated with radiation and chemo (carboplatin and paclitaxel), finished cycle 5 on 6/21/24  Follow with hematology in o/p settings

## 2024-06-22 NOTE — ASSESSMENT & PLAN NOTE
Home meds: amlodipine 5 mg and losartan 50 mg daily    Holding meds in setting soft blood pressure and 2/2 severe sepsis/neutropenic fever

## 2024-06-22 NOTE — ED ATTENDING ATTESTATION
6/22/2024  I, Sharyn Chacon MD, saw and evaluated the patient. I have discussed the patient with the resident/non-physician practitioner and agree with the resident's/non-physician practitioner's findings, Plan of Care, and MDM as documented in the resident's/non-physician practitioner's note, except where noted. All available labs and Radiology studies were reviewed.  I was present for key portions of any procedure(s) performed by the resident/non-physician practitioner and I was immediately available to provide assistance.       At this point I agree with the current assessment done in the Emergency Department.  I have conducted an independent evaluation of this patient a history and physical is as follows: patient with chemo 6/21/24. Awoke with fever tonight. Hx of lung CA. 5th dose of chemo, also radiation.  Some throat irritation due to radiation. Patient with chills tonight. Temp was 102.5 at home. + cough, chronic in nature. No abdominal pain. No chest pain. No SOB. No urinary complaints, no GI complaints. DDX: neutropenia fever, other source of fever, dehydration, electrolyte abnormality. Heart RRR, lungs diminished in upper chest, abdomen soft, nontender. Left forearm with erythema on volar aspect. Patient states infiltration of chemo agent about a week ago - states less swollen and red than previously. Area is warm to touch.    ED Course     Patient with fever on chemotherapy. IV antibiotics given, fluids given, patient will require admission.     Critical Care Time  Procedures

## 2024-06-22 NOTE — ASSESSMENT & PLAN NOTE
Lab Results   Component Value Date    EGFR 63 06/22/2024    EGFR 58 06/18/2024    EGFR 69 06/10/2024    CREATININE 0.90 06/22/2024    CREATININE 0.97 06/18/2024    CREATININE 0.84 06/10/2024     Patient creatinine at baseline. Avoid hypotension and nephrotoxins. Monitor on AM BMP.

## 2024-06-22 NOTE — SEPSIS NOTE
"  Sepsis Note   Ayla Nye 73 y.o. female MRN: [id number]  Unit/Bed#: ED-07 Encounter: [id number]       Initial Sepsis Screening       Row Name 06/22/24 0526 06/22/24 0348             Is the patient's history suggestive of a new or worsening infection? Yes (Proceed)  -NT Yes (Proceed)  -KS       Suspected source of infection soft tissue  -NT suspect infection, source unknown  -KS       Indicate SIRS criteria Hyperthemia > 38.3C (100.9F) OR Hypothermia <36C (96.8F);Tachycardia > 90 bpm  -NT Hyperthemia > 38.3C (100.9F) OR Hypothermia <36C (96.8F);Tachycardia > 90 bpm;Leukocytosis (WBC > 53091 IJL) OR Leukopenia (WBC <4000 IJL) OR Bandemia (WBC >10% bands)  -KS       Are two or more of the above signs & symptoms of infection both present and new to the patient? Yes (Proceed)  -NT Yes (Proceed)  -KS       Assess for evidence of organ dysfunction: Are any of the below criteria present within 6 hours of suspected infection and SIRS criteria that are NOT considered to be chronic conditions? Lactate > 2.0  -NT Lactate > 2.0  -KS       Date of presentation of severe sepsis -- 06/22/24  -KS       Time of presentation of severe sepsis -- 0348  -KS       Sepsis Note: Click \"NEXT\" below (NOT \"close\") to generate sepsis note based on above information. -- --                 User Key  (r) = Recorded By, (t) = Taken By, (c) = Cosigned By      Initials Name Provider Type    RENY Salcedo DO Resident    NT Elizabeth Castelan MD Resident                    Default Flowsheet Data (Last 720 Hours)       Sepsis Reassess       Row Name 06/22/24 0432                   Repeat Volume Status and Tissue Perfusion Assessment Performed    Date of Reassessment: 06/22/24  -KS        Time of Reassessment: 0433  -KS        Sepsis Reassessment Note: Click \"NEXT\" below (NOT \"close\") to generate sepsis reassessment note. --        Repeat Volume Status and Tissue Perfusion Assessment Performed --                  User Key  (r) " = Recorded By, (t) = Taken By, (c) = Cosigned By      Initials Name Provider Type    RENY Salcedo,  Resident                    There is no height or weight on file to calculate BMI.  Wt Readings from Last 1 Encounters:   06/21/24 47.6 kg (105 lb)     IBW (Ideal Body Weight): 50.12 kg    Ideal body weight: 50.1 kg (110 lb 7.9 oz)

## 2024-06-22 NOTE — ED PROVIDER NOTES
History  Chief Complaint   Patient presents with    Fever     Pt states she woke up with chills checked her temp and was 102. Pt has hx of lung cancer had chemo treatment earlier today. Denies Dizziness/weakness      HPI  (Rosemary Piperata) Ayla Nye is a 73 y.o. female     They presented to the emergency department on 2024.   Chief Complaint   Patient presents with    Fever     Pt states she woke up with chills checked her temp and was 102. Pt has hx of lung cancer had chemo treatment earlier today. Denies Dizziness/weakness    .    The patient states that she had chemo yesterday .  Patient states she did not have any issues afterward.  Patient states a week ago she had an IV for chemoinfusion and her left arm.  Patient states it became red and swollen at that time.  IV was removed and placed in other arm.  Patient states she was told it was just saline into that arm.  Patient states still red today and warm.  Patient denies pain in this arm. Patient denies chest pain, shortness of breath, cough, congestion, nausea, vomiting, abdominal pain, dysuria, hematuria,  diarrhea, constipation, or any other complaint at this time.       Prior to Admission Medications   Prescriptions Last Dose Informant Patient Reported? Taking?   Cholecalciferol (VITAMIN D3) 5000 units TABS  Self Yes No   Si,000 Units Daily    Fluocinolone Acetonide Scalp 0.01 % OIL  Self Yes No   Probiotic Product (PROBIOTIC DAILY PO)  Self Yes No   Sig: Take 1 capsule by mouth daily   amLODIPine (NORVASC) 5 mg tablet  Self Yes No   Sig: Take 5 mg by mouth daily   aspirin 81 MG tablet  Self Yes No   Sig: Take 81 mg by mouth daily    diphenhydrAMINE (BENADRYL) 25 mg capsule  Self Yes No   Sig: Take 25 mg by mouth every 12 (twelve) hours as needed for itching    halobetasol (ULTRAVATE) 0.05 % ointment  Self Yes No   Sig:     hydrocortisone 2.5 % ointment  Self Yes No   hydroxyurea (HYDREA) 500 mg capsule  Self No No   Sig: Take 1  capsule (500 mg total) by mouth daily   Patient not taking: Reported on 2024   losartan (COZAAR) 50 mg tablet  Self No No   Sig: Take 1 tablet (50 mg total) by mouth daily   ondansetron (ZOFRAN) 8 mg tablet  Self No No   Sig: Take 1 tablet (8 mg total) by mouth every 8 (eight) hours as needed for nausea or vomiting   prochlorperazine (COMPAZINE) 10 mg tablet  Self No No   Sig: Take 1 tablet (10 mg total) by mouth every 6 (six) hours as needed for nausea or vomiting   sucralfate (CARAFATE) 1 g tablet  Self No No   Sig: Take 1 tablet (1 g total) by mouth 3 (three) times a day Crush 1g tab in 5 mL water, 3x daily as needed   vitamin B-12 (VITAMIN B-12) 1,000 mcg tablet  Self No No   Sig: Take 1 tablet (1,000 mcg total) by mouth daily      Facility-Administered Medications: None       Past Medical History:   Diagnosis Date    2022    Allergic to neomiacin and cephalexin    Cataract 2023    Due to have durgery 2024    Essential thrombocytosis (HCC)     controlled with medication    Hyperlipidemia     Hypertension     Mass in chest     Nodular goiter     Pneumonia Oct 16, 2023    Also  2024    Psoriasis        Past Surgical History:   Procedure Laterality Date    APPENDECTOMY      BREAST CYST EXCISION Right     COLONOSCOPY  10/31/2018    DXA PROCEDURE (HISTORICAL)  2017    MAMMO (HISTORICAL)      8-24-18    MAMMO NEEDLE LOCALIZATION RIGHT (ALL INC) Right 2009    SKIN LESION EXCISION      bridge of nose       Family History   Problem Relation Age of Onset    Stroke Mother     Depression Mother             Hypertension Mother     Hearing loss Mother     Tuberculosis Father     Cancer Brother         lung    Tuberculosis Brother     Coronary artery disease Brother     Hypertension Brother     No Known Problems Daughter     No Known Problems Daughter     No Known Problems Maternal Grandmother     No Known Problems Maternal Grandfather     No Known Problems Paternal  Grandmother     No Known Problems Paternal Grandfather     No Known Problems Maternal Aunt     No Known Problems Maternal Aunt     No Known Problems Maternal Aunt     No Known Problems Maternal Aunt     No Known Problems Paternal Aunt     Cancer Brother         Throat cancer     I have reviewed and agree with the history as documented.    E-Cigarette/Vaping    E-Cigarette Use Never User      E-Cigarette/Vaping Substances    Nicotine Yes     THC No     CBD No     Flavoring No     Other No     Unknown No      Social History     Tobacco Use    Smoking status: Former     Current packs/day: 1.00     Average packs/day: 1 pack/day for 58.5 years (58.5 ttl pk-yrs)     Types: Cigarettes     Start date: 1966    Smokeless tobacco: Never    Tobacco comments:     Quit 2010   Vaping Use    Vaping status: Never Used   Substance Use Topics    Alcohol use: Not Currently    Drug use: Never        Review of Systems   Constitutional:  Positive for chills and fever.   HENT:  Negative for ear pain and sore throat.    Eyes:  Negative for pain and visual disturbance.   Respiratory:  Negative for cough and shortness of breath.    Cardiovascular:  Negative for chest pain and palpitations.   Gastrointestinal:  Negative for abdominal pain, constipation, diarrhea, nausea and vomiting.   Genitourinary:  Negative for dysuria and hematuria.   Musculoskeletal:  Negative for arthralgias and back pain.   Skin:  Positive for color change. Negative for rash.   Neurological:  Negative for seizures and syncope.   All other systems reviewed and are negative.      Physical Exam  ED Triage Vitals   Temperature Pulse Respirations Blood Pressure SpO2   06/22/24 0252 06/22/24 0252 06/22/24 0252 06/22/24 0252 06/22/24 0252   (!) 102.2 °F (39 °C) (!) 110 20 146/75 97 %      Temp Source Heart Rate Source Patient Position - Orthostatic VS BP Location FiO2 (%)   06/22/24 0252 06/22/24 0252 06/22/24 0252 06/22/24 0252 --   Oral Monitor Sitting Right arm       Pain  Score       06/22/24 0415       3             Orthostatic Vital Signs  Vitals:    06/22/24 0530 06/22/24 0600 06/22/24 0615 06/22/24 0702   BP: 110/62 122/69 116/66 115/61   Pulse: 85 84 81 79   Patient Position - Orthostatic VS: Lying  Sitting        Physical Exam  Vitals and nursing note reviewed.   Constitutional:       General: She is not in acute distress.     Appearance: She is well-developed.   HENT:      Head: Normocephalic and atraumatic.   Eyes:      Conjunctiva/sclera: Conjunctivae normal.   Cardiovascular:      Rate and Rhythm: Normal rate and regular rhythm.      Heart sounds: No murmur heard.  Pulmonary:      Effort: Pulmonary effort is normal. No respiratory distress.      Breath sounds: Normal breath sounds.   Abdominal:      Palpations: Abdomen is soft.      Tenderness: There is no abdominal tenderness.   Musculoskeletal:         General: No swelling.      Cervical back: Neck supple.   Skin:     General: Skin is warm and dry.      Capillary Refill: Capillary refill takes less than 2 seconds.      Comments: Redness to left volar forearm with warmth.  No tenderness.  No edema.   Neurological:      General: No focal deficit present.      Mental Status: She is alert and oriented to person, place, and time.   Psychiatric:         Mood and Affect: Mood normal.         ED Medications  Medications   aspirin chewable tablet 81 mg (has no administration in time range)   diphenhydrAMINE (BENADRYL) tablet 25 mg (has no administration in time range)   sucralfate (CARAFATE) tablet 1 g (has no administration in time range)   Cholecalciferol (VITAMIN D3) tablet 5,000 Units (has no administration in time range)   saccharomyces boulardii (FLORASTOR) capsule 250 mg (has no administration in time range)   cyanocobalamin (VITAMIN B-12) tablet 1,000 mcg (has no administration in time range)   acetaminophen (TYLENOL) tablet 650 mg (has no administration in time range)   enoxaparin (LOVENOX) subcutaneous injection 40 mg  (has no administration in time range)   lactated ringers infusion (has no administration in time range)   piperacillin-tazobactam (ZOSYN) IVPB (EXTENDED INFUSION) 4.5 g (has no administration in time range)   acetaminophen (TYLENOL) tablet 975 mg (975 mg Oral Given 6/22/24 0315)   lactated ringers bolus 1,000 mL (1,000 mL Intravenous New Bag 6/22/24 0316)   piperacillin-tazobactam (ZOSYN) IVPB 4.5 g (0 g Intravenous Stopped 6/22/24 0457)   lactated ringers bolus 500 mL (500 mL Intravenous New Bag 6/22/24 0705)       Diagnostic Studies  Results Reviewed       Procedure Component Value Units Date/Time    Lactic acid 2 Hours [[id number]]  (Abnormal) Collected: 06/22/24 0542    Lab Status: Final result Specimen: Blood from Arm, Right Updated: 06/22/24 0620     LACTIC ACID 2.2 mmol/L     Narrative:      Result may be elevated if tourniquet was used during collection.    RBC Morphology Reflex Test [[id number]] Collected: 06/22/24 0310    Lab Status: Final result Specimen: Blood from Arm, Right Updated: 06/22/24 0501    Urine Microscopic [[id number]]  (Abnormal) Collected: 06/22/24 0442    Lab Status: Final result Specimen: Urine, Clean Catch Updated: 06/22/24 0459     RBC, UA 2-4 /hpf      WBC, UA 1-2 /hpf      Epithelial Cells Occasional /hpf      Bacteria, UA None Seen /hpf      MUCUS THREADS Occasional    UA w Reflex to Microscopic w Reflex to Culture [[id number]]  (Abnormal) Collected: 06/22/24 0442    Lab Status: Final result Specimen: Urine, Clean Catch Updated: 06/22/24 0458     Color, UA Light Yellow     Clarity, UA Clear     Specific Gravity, UA 1.020     pH, UA 5.5     Leukocytes, UA Negative     Nitrite, UA Negative     Protein, UA Negative mg/dl      Glucose, UA Negative mg/dl      Ketones, UA Negative mg/dl      Urobilinogen, UA <2.0 mg/dl      Bilirubin, UA Negative     Occult Blood, UA Small    CBC and differential [[id number]]  (Abnormal) Collected: 06/22/24 0310    Lab Status: Final result Specimen: Blood from  Arm, Right Updated: 06/22/24 0405     WBC 3.74 Thousand/uL      RBC 2.74 Million/uL      Hemoglobin 8.6 g/dL      Hematocrit 27.4 %       fL      MCH 31.4 pg      MCHC 31.4 g/dL      RDW 15.1 %      MPV 9.3 fL      Platelets 211 Thousands/uL     Narrative:      This is an appended report.  These results have been appended to a previously verified report.    Manual Differential(PHLEBS Do Not Order) [[id number]]  (Abnormal) Collected: 06/22/24 0310    Lab Status: Final result Specimen: Blood from Arm, Right Updated: 06/22/24 0405     Segmented % 96 %      Bands % 1 %      Lymphocytes % 1 %      Monocytes % 1 %      Eosinophils % 0 %      Basophils % 1 %      Absolute Neutrophils 3.63 Thousand/uL      Absolute Lymphocytes 0.04 Thousand/uL      Absolute Monocytes 0.04 Thousand/uL      Absolute Eosinophils 0.00 Thousand/uL      Absolute Basophils 0.04 Thousand/uL      Total Counted --     RBC Morphology Normal     Platelet Estimate Adequate    FLU/RSV/COVID - if FLU/RSV clinically relevant [[id number]]  (Normal) Collected: 06/22/24 0310    Lab Status: Final result Specimen: Nares from Nose Updated: 06/22/24 0401     SARS-CoV-2 Negative     INFLUENZA A PCR Negative     INFLUENZA B PCR Negative     RSV PCR Negative    Narrative:      FOR PEDIATRIC PATIENTS - copy/paste COVID Guidelines URL to browser: [email or web address]    SARS-CoV-2 assay is a Nucleic Acid Amplification assay intended for the  qualitative detection of nucleic acid from SARS-CoV-2 in nasopharyngeal  swabs. Results are for the presumptive identification of SARS-CoV-2 RNA.    Positive results are indicative of infection with SARS-CoV-2, the virus  causing COVID-19, but do not rule out bacterial infection or co-infection  with other viruses. Laboratories within the [place] and its  territories are required to report all positive results to the appropriate  public health authorities. Negative  results do not preclude SARS-CoV-2  infection and should not be used as the sole basis for treatment or other  patient management decisions. Negative results must be combined with  clinical observations, patient history, and epidemiological information.  This test has not been FDA cleared or approved.    This test has been authorized by FDA under an Emergency Use Authorization  (EUA). This test is only authorized for the duration of time the  declaration that circumstances exist justifying the authorization of the  emergency use of an in vitro diagnostic tests for detection of SARS-CoV-2  virus and/or diagnosis of COVID-19 infection under section 564(b)(1) of  the Act, 21 U.S.C. 360bbb-3(b)(1), unless the authorization is terminated  or revoked sooner. The test has been validated but independent review by FDA  and CLIA is pending.    Test performed using [employer]: This RT-PCR assay targets N2,  a region unique to SARS-CoV-2. A conserved region in the E-gene was chosen  for pan-Sarbecovirus detection which includes SARS-CoV-2.    According to CMS-2020-01-R, this platform meets the definition of high-throughput technology.    Procalcitonin [[id number]]  (Normal) Collected: 06/22/24 0310    Lab Status: Final result Specimen: Blood from Arm, Right Updated: 06/22/24 0349     Procalcitonin 0.08 ng/ml     Blood culture #1 [[id number]] Collected: 06/22/24 0343    Lab Status: In process Specimen: Blood from Arm, Left Updated: 06/22/24 0348    Blood culture #2 [[id number]] Collected: 06/22/24 0312    Lab Status: In process Specimen: Blood from Arm, Right Updated: 06/22/24 0348    Comprehensive metabolic panel [[id number]]  (Abnormal) Collected: 06/22/24 0312    Lab Status: Final result Specimen: Blood from Arm, Right Updated: 06/22/24 0338     Sodium 135 mmol/L      Potassium 4.2 mmol/L      Chloride 105 mmol/L      CO2 22 mmol/L      ANION GAP 8 mmol/L      BUN 13 mg/dL      Creatinine 0.90 mg/dL      Glucose 118 mg/dL       Calcium 8.9 mg/dL      AST 8 U/L      ALT 5 U/L      Alkaline Phosphatase 63 U/L      Total Protein 6.2 g/dL      Albumin 3.6 g/dL      Total Bilirubin 0.35 mg/dL      eGFR 63 ml/min/1.73sq m     Narrative:      National Kidney Disease Foundation guidelines for Chronic Kidney Disease (CKD):     Stage 1 with normal or high GFR (GFR > 90 mL/min/1.73 square meters)    Stage 2 Mild CKD (GFR = 60-89 mL/min/1.73 square meters)    Stage 3A Moderate CKD (GFR = 45-59 mL/min/1.73 square meters)    Stage 3B Moderate CKD (GFR = 30-44 mL/min/1.73 square meters)    Stage 4 Severe CKD (GFR = 15-29 mL/min/1.73 square meters)    Stage 5 End Stage CKD (GFR <15 mL/min/1.73 square meters)  Note: GFR calculation is accurate only with a steady state creatinine    Lactic acid [[id number]]  (Abnormal) Collected: 06/22/24 0310    Lab Status: Final result Specimen: Blood from Arm, Right Updated: 06/22/24 0337     LACTIC ACID 2.1 mmol/L     Narrative:      Result may be elevated if tourniquet was used during collection.    Protime-INR [[id number]]  (Normal) Collected: 06/22/24 0310    Lab Status: Final result Specimen: Blood from Arm, Right Updated: 06/22/24 0335     Protime 13.2 seconds      INR 0.95    APTT [[id number]]  (Normal) Collected: 06/22/24 0310    Lab Status: Final result Specimen: Blood from Arm, Right Updated: 06/22/24 0335     PTT 25 seconds                    XR chest portable   ED Interpretation by Ida Salcedo DO (06/22 0428)   Mass in right upper lobe, known.  No acute cardiopulmonary process visualized.      Final Result by Shana Yan MD (06/22 0712)      No acute cardiopulmonary disease.      Redemonstration of 4 cm right upper lobe mass.      Workstation performed: [id number]               Procedures  ECG 12 Lead Documentation Only    Date/Time: 6/22/2024 3:24 AM    Performed by: Ida Salcedo DO  Authorized by: Ida Salcedo DO    Patient location:  ED  Previous ECG:     Previous ECG:   Compared to current    Comparison ECG info:  2020    Similarity:  Changes noted  Quality:     Tracing quality:  Limited by artifact  Rate:     ECG rate:  98    ECG rate assessment: normal    Rhythm:     Rhythm: sinus rhythm    Ectopy:     Ectopy: none    QRS:     QRS axis:  Normal    QRS intervals:  Normal  Conduction:     Conduction: normal    ST segments:     ST segments:  Abnormal    Depression:  V3 and V4  Q waves:     Q waves:  II and aVF        ED Course  ED Course as of 06/22/24 0811   Sat Jun 22, 2024   0334 CBC and differential(!)  Leukopenia.  Decrease in red blood cells.  Anemia, worse than a few days ago which was 9.2.  Elevated in MCV, potential megaloblastic anemia   0344 PTT: 25   0344 LACTIC ACID(!): 2.1  Receiving IV fluids.   0344 PROTIME: 13.2   0344 POCT INR: 0.95   0344 Comprehensive metabolic panel(!)  Lites within normal limits.  Decreased ALT and AST as well as decreased protein.  Otherwise liver function testing within normal limits.  Kidney function appears to be at baseline.   0357 Procalcitonin: 0.08   0410 Manual Differential(PHLEBS Do Not Order)(!)   0413 SARS-COV-2: Negative   0413 INFLU A PCR: Negative   0413 INFLU B PCR: Negative   0413 RSV PCR: Negative   0428 XR chest portable  Mass in right upper lobe, known.  No acute cardiopulmonary process visualized.                          Initial Sepsis Screening       Row Name 06/22/24 0526 06/22/24 0348             Is the patient's history suggestive of a new or worsening infection? Yes (Proceed)  -NT Yes (Proceed)  -KS       Suspected source of infection soft tissue  -NT suspect infection, source unknown  -KS       Indicate SIRS criteria Hyperthemia > 38.3C (100.9F) OR Hypothermia <36C (96.8F);Tachycardia > 90 bpm  -NT Hyperthemia > 38.3C (100.9F) OR Hypothermia <36C (96.8F);Tachycardia > 90 bpm;Leukocytosis (WBC > 71087 IJL) OR Leukopenia (WBC <4000 IJL) OR Bandemia (WBC >10% bands)  -KS       Are two or more of the above signs &  "symptoms of infection both present and new to the patient? Yes (Proceed)  -NT Yes (Proceed)  -KS       Assess for evidence of organ dysfunction: Are any of the below criteria present within 6 hours of suspected infection and SIRS criteria that are NOT considered to be chronic conditions? Lactate > 2.0  -NT Lactate > 2.0  -KS       Date of presentation of severe sepsis -- 06/22/24  -KS       Time of presentation of severe sepsis -- 0348  -KS       Sepsis Note: Click \"NEXT\" below (NOT \"close\") to generate sepsis note based on above information. -- --                 User Key  (r) = Recorded By, (t) = Taken By, (c) = Cosigned By      Initials Name Provider Type    RENY Salcedo DO Resident    NT Elizabeth Castelan MD Resident                  Default Flowsheet Data (Last 720 Hours)       Sepsis Reassess       Row Name 06/22/24 0432                   Repeat Volume Status and Tissue Perfusion Assessment Performed    Date of Reassessment: 06/22/24  -KS        Time of Reassessment: 0433  -KS        Sepsis Reassessment Note: Click \"NEXT\" below (NOT \"close\") to generate sepsis reassessment note. --        Repeat Volume Status and Tissue Perfusion Assessment Performed --                  User Key  (r) = Recorded By, (t) = Taken By, (c) = Cosigned By      Initials Name Provider Type    RENY Salcedo DO Resident                  SBIRT 20yo+      Flowsheet Row Most Recent Value   Initial Alcohol Screen: US AUDIT-C     1. How often do you have a drink containing alcohol? 0 Filed at: 06/22/2024 0416   2. How many drinks containing alcohol do you have on a typical day you are drinking?  0 Filed at: 06/22/2024 0416   3a. Male UNDER 65: How often do you have five or more drinks on one occasion? 0 Filed at: 06/22/2024 0416   3b. FEMALE Any Age, or MALE 65+: How often do you have 4 or more drinks on one occassion? 0 Filed at: 06/22/2024 0416   Audit-C Score 0 Filed at: 06/22/2024 0416   ALEXIS: How many " times in the past year have you...    Used an illegal drug or used a prescription medication for non-medical reasons? Never Filed at: 06/22/2024 0416                  Medical Decision Making  74 yo F presented to ED for fever. Associated symptoms: chills. Exam findings: Tachycardic, hot to touch, some redness to left forearm otherwise unremarkable..  Differentials diagnoses considered: Neutropenic fever versus cellulitis versus UTI versus pneumonia. Patient was given tylenol for fever. On re-examination, patient had resolution of fever.  See ED course for more details on lab and imaging interpretation.  Based on these results and H&P, severe sepsis, source potential neutropenic fever versus cellulitis.  Patient was given Zosyn in the emergency department to cover for neutropenic fever.  Results and clinical impressions were discussed with patient and family. They expressed understanding. Plan: Admit to medicine for further evaluation and management.. This plan was also discussed with patient, who was agreeable with this plan. Patient was given the opportunity to ask questions in ED. All questions and concerns were addressed in ED.     Amount and/or Complexity of Data Reviewed  Labs: ordered. Decision-making details documented in ED Course.  Radiology: ordered and independent interpretation performed. Decision-making details documented in ED Course.    Risk  OTC drugs.  Decision regarding hospitalization.          Disposition  Final diagnoses:   Fever   Leukopenia   Anemia   Severe sepsis (HCC)     Time reflects when diagnosis was documented in both MDM as applicable and the Disposition within this note       Time User Action Codes Description Comment    6/22/2024  5:09 AM Ida Salcedo Add [R50.9] Fever     6/22/2024  5:10 AM Ida Salcedo Add [D72.819] Leukopenia     6/22/2024  5:10 AM Ida Salcedo Add [D64.9] Anemia     6/22/2024  5:10 AM Ida Salcedo Add [A41.9,  R65.20] Severe sepsis (HCC)            ED Disposition       ED Disposition   Admit    Condition   Stable    Date/Time   Sat Jun 22, 2024 2953    Comment   Case was discussed with SKINNY and the patient's admission status was agreed to be Admission Status: inpatient status to the service of Dr. Donaldson .               Follow-up Information    None         Patient's Medications   Discharge Prescriptions    No medications on file     No discharge procedures on file.    PDMP Review       None             ED Provider  Attending physically available and evaluated Ayla Nye. I managed the patient along with the ED Attending.    Electronically Signed by           Ida Salcedo DO  06/22/24 0814

## 2024-06-22 NOTE — H&P
[facility]  H&P  Name: Ayla Nye 73 y.o. female I MRN: [id number]  Unit/Bed#: ED-07 I Date of Admission: 6/22/2024   Date of Service: 6/22/2024 I Hospital Day: 0      Assessment & Plan   * Sepsis (HCC)  Assessment & Plan  POA to ED due to fever noticed in the middle of the night  Pt express no complains  BP soft 110/60  On physical exam mild erythema noticed on the inner left jerome, non-tender, not warm to touch, was present since the last week after previous  chemo infusion and currently resolving  Meets sepsis criteria with WBC < 4, fever 102.2 and tachycardia 110. Lactic acid 2.1  CXR: RUL opacification due to mass/ post radiation scarring  S/p 1 L bolus and zosyn for 1 dose in the ED    At the moment of my evaluation my impression that current pt's state is more reactive to recent chemo than true sepsis or neutropenic fever however in setting of suppressed immune system pt would benefit from coverage with broad spectrum Abx and complete work up    Plan:  Tylenol for fever  Monitor vitals per unit routine  Trend CBC, fever curve  Follow blood culture  Give additional 500 cc of LR bolus followed by 55 cc h maintanance IVF for 10 h  C/w Zosyn  Consider CT CAP if new fever episode occur  Consider antifungal coverage if fever persists after 4-7 days    Neutropenic fever  (HCC)  Assessment & Plan  Patient receiving chemotherapy for R lung cancer.  See sepsis for further plan.   WBC 3.7, ANC 3.4    Insomnia  Assessment & Plan  Pt is using benadryl for sleep at home as needed    Malignant neoplasm of upper lobe, right bronchus or lung (HCC)  Assessment & Plan  Diagnosed in march 2024  [facility] of the RUL lung cancer  Currently is being treated with radiation and chemo (carboplatin and paclitaxel), finished cycle 5 on 6/21/24  Follow with hematology in o/p settings      Stage 3b chronic kidney disease (CKD) (HCC)  Assessment & Plan  Lab Results   Component Value Date    EGFR 63  06/22/2024    EGFR 58 06/18/2024    EGFR 69 06/10/2024    CREATININE 0.90 06/22/2024    CREATININE 0.97 06/18/2024    CREATININE 0.84 06/10/2024     Patient creatinine at baseline. Avoid hypotension and nephrotoxins. Monitor on AM BMP.     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 Sequential Compression Devices Ordered.  Code Status: Level 1 - Full Code   Discussion with family: Patient declined call to .     Anticipated Length of Stay: Patient will be admitted on an inpatient basis with an anticipated length of stay of greater than 2 midnights secondary to severe sepsis.    Chief Complaint: fever    History of Present Illness:  Ayla Nye is a 73 y.o. female with a PMH of HTN, essential thrombocytosis, CKD 3b, latent TB, psoriasis, psoriatic arthritis, CHUYITA Lung ca (NSCLC) on chemo (paclitaxel and carboplatin) and radiation who presents with due to fever and rigors noticed in the middle of the night. Pt states that she is feeling well and has no new complains She endorsed a fatigue which is present since she was started on chemo and radiatio treatment. Pt had last chemo infusion yesterday on 6/21st  (fifth cycle) and felt well right after. Also, patient mentioned that last week she had mild erythema, tenderness and swelling in her left antecubital area. Almost completely resolved at this moment.    Pt denies chests pain,sob, admits dry cough which started after radiation, denies nausea, vomiting, diarrhea,constipation, dysuria, rash, neuropathy.     Pt is hemodynamically stable on admission, met SIRS criteria by tachycardia in  100-110, neutropenia WBC 3.7 and fever 102, with Lactic acid 2.1 pt fall under severe sepsis criteria. Source of infection is unclear at this time, after pt's examination I have low suspicion that minimal residual erythema in the left antecubital area could be a cause of current condition. UA unremarkable and CXR are clear (on my read). Possibly, pt's current presentation is reactive in settings of  recent chemo however pt will benefit from complete work up and exclusion of other source of infection in settings of immunocompromised state. If no improvement with fluid and Zosyn with consider CT CAP.    Pt will be admitted to [facility] for further management.    Review of Systems:  Review of Systems   Constitutional:  Positive for chills, fatigue and fever.   HENT: Negative.     Eyes: Negative.    Respiratory:  Positive for cough.    Cardiovascular: Negative.    Gastrointestinal: Negative.    Genitourinary: Negative.    Musculoskeletal: Negative.    Allergic/Immunologic: Negative.    Neurological: Negative.    Hematological: Negative.    Psychiatric/Behavioral: Negative.         Past Medical and Surgical History:   Past Medical History:   Diagnosis Date    Allergic 2022    Allergic to neomiacin and cephalexin    Cataract Nov. 2023    Due to have durgery June 2024    Essential thrombocytosis (HCC)     controlled with medication    Hyperlipidemia     Hypertension     Mass in chest     Nodular goiter     Pneumonia Oct 16, 2023    Also  Feb 2024    Psoriasis        Past Surgical History:   Procedure Laterality Date    APPENDECTOMY      BREAST CYST EXCISION Right 2009    COLONOSCOPY  10/31/2018    DXA PROCEDURE (HISTORICAL)  04/21/2017    MAMMO (HISTORICAL)      8-24-18    MAMMO NEEDLE LOCALIZATION RIGHT (ALL INC) Right 07/13/2009    SKIN LESION EXCISION      bridge of nose       Meds/Allergies:  Prior to Admission medications    Medication Sig Start Date End Date Taking? Authorizing Provider   amLODIPine (NORVASC) 5 mg  tablet Take 5 mg by mouth daily    Historical Provider, MD   aspirin 81 MG tablet Take 81 mg by mouth daily     Historical Provider, MD   Cholecalciferol (VITAMIN D3) 5000 units TABS 5,000 Units Daily     Historical Provider, MD   diphenhydrAMINE (BENADRYL) 25 mg capsule Take 25 mg by mouth every 12 (twelve) hours as needed for itching     Historical Provider, MD   Fluocinolone Acetonide Scalp 0.01 % OIL  10/9/19   Historical Provider, MD   halobetasol (ULTRAVATE) 0.05 % ointment   12/12/19   Historical Provider, MD   hydrocortisone 2.5 % ointment  11/18/20   Historical Provider, MD   hydroxyurea (HYDREA) 500 mg capsule Take 1 capsule (500 mg total) by mouth daily  Patient not taking: Reported on 6/17/2024 3/5/24   ZULEYMA Boland   losartan (COZAAR) 50 mg tablet Take 1 tablet (50 mg total) by mouth daily 3/13/24   Rafy Angelo MD   ondansetron (ZOFRAN) 8 mg tablet Take 1 tablet (8 mg total) by mouth every 8 (eight) hours as needed for nausea or vomiting 4/30/24   Rosalinda Castro MD   Probiotic Product (PROBIOTIC DAILY PO) Take 1 capsule by mouth daily    Historical Provider, MD   prochlorperazine (COMPAZINE) 10 mg tablet Take 1 tablet (10 mg total) by mouth every 6 (six) hours as needed for nausea or vomiting 4/30/24   Rosalinda Castro MD   sucralfate (CARAFATE) 1 g tablet Take 1 tablet (1 g total) by mouth 3 (three) times a day Crush 1g tab in 5 mL water, 3x daily as needed 6/13/24   Honey Gamboa MD   vitamin B-12 (VITAMIN B-12) 1,000 mcg tablet Take 1 tablet (1,000 mcg total) by mouth daily 9/14/23   ZULEYMA Boland     I have reviewed home medications with patient personally.    Allergies:   Allergies   Allergen Reactions    Doxycycline Headache    Keflex [Cephalexin] Rash    Neomycin-Polymyxin-Dexameth Eye Swelling       Social History:  Marital Status:    Occupation: retired  Patient Pre-hospital Living Situation: Home  Patient Pre-hospital Level of Mobility: walks  Patient  Pre-hospital Diet Restrictions: none  Substance Use History:   Social History     Substance and Sexual Activity   Alcohol Use Not Currently     Social History     Tobacco Use   Smoking Status Former    Current packs/day: 1.00    Average packs/day: 1 pack/day for 58.5 years (58.5 ttl pk-yrs)    Types: Cigarettes    Start date:    Smokeless Tobacco Never   Tobacco Comments    Quit      Social History     Substance and Sexual Activity   Drug Use Never       Family History:  Family History   Problem Relation Age of Onset    Stroke Mother     Depression Mother             Hypertension Mother     Hearing loss Mother     Tuberculosis Father     Cancer Brother         lung    Tuberculosis Brother     Coronary artery disease Brother     Hypertension Brother     No Known Problems Daughter     No Known Problems Daughter     No Known Problems Maternal Grandmother     No Known Problems Maternal Grandfather     No Known Problems Paternal Grandmother     No Known Problems Paternal Grandfather     No Known Problems Maternal Aunt     No Known Problems Maternal Aunt     No Known Problems Maternal Aunt     No Known Problems Maternal Aunt     No Known Problems Paternal Aunt     Cancer Brother         Throat cancer       Physical Exam:     Vitals:   Blood Pressure: 122/69 (24 0600)  Pulse: 84 (24 0600)  Temperature: 97.9 °F (36.6 °C) (24 0444)  Temp Source: Oral (24 0444)  Respirations: 18 (24 0600)  SpO2: 97 % (24 0600)    Physical Exam  Vitals and nursing note reviewed.   Constitutional:       General: She is not in acute distress.     Appearance: Normal appearance. She is well-developed.   HENT:      Head: Normocephalic and atraumatic.      Nose: Nose normal.      Mouth/Throat:      Mouth: Mucous membranes are moist.   Eyes:      Conjunctiva/sclera: Conjunctivae normal.   Cardiovascular:      Rate and Rhythm: Regular rhythm. Tachycardia present.      Heart sounds: No murmur  "heard.  Pulmonary:      Effort: Pulmonary effort is normal. No respiratory distress.      Breath sounds: Normal breath sounds.      Comments: Diminished rul breathing sounds  Abdominal:      Palpations: Abdomen is soft.      Tenderness: There is no abdominal tenderness.   Musculoskeletal:         General: No swelling.      Cervical back: Neck supple.   Skin:     General: Skin is warm and dry.      Capillary Refill: Capillary refill takes less than 2 seconds.   Neurological:      Mental Status: She is alert.   Psychiatric:         Mood and Affect: Mood normal.          Additional Data:     Lab Results:  Results from last 7 days   Lab Units 06/22/24  0310 06/18/24  1010   WBC Thousand/uL 3.74* 4.29*   HEMOGLOBIN g/dL 8.6* 9.1*   HEMATOCRIT % 27.4* 29.8*   PLATELETS Thousands/uL 211 278   BANDS PCT % 1  --    SEGS PCT %  --  80*   LYMPHO PCT % 1* 9*   MONO PCT % 1* 8   EOS PCT % 0 1     Results from last 7 days   Lab Units 06/22/24  0312   SODIUM mmol/L 135   POTASSIUM mmol/L 4.2   CHLORIDE mmol/L 105   CO2 mmol/L 22   BUN mg/dL 13   CREATININE mg/dL 0.90   ANION GAP mmol/L 8   CALCIUM mg/dL 8.9   ALBUMIN g/dL 3.6   TOTAL BILIRUBIN mg/dL 0.35   ALK PHOS U/L 63   ALT U/L 5*   AST U/L 8*   GLUCOSE RANDOM mg/dL 118     Results from last 7 days   Lab Units 06/22/24  0310   INR  0.95         No results found for: \"HGBA1C\"  Results from last 7 days   Lab Units 06/22/24  0542 06/22/24  0310   LACTIC ACID mmol/L 2.2* 2.1*   PROCALCITONIN ng/ml  --  0.08       Lines/Drains:  Invasive Devices       Peripheral Intravenous Line  Duration             Peripheral IV 06/22/24 Distal;Right;Ventral (anterior) Forearm <1 day                        Imaging: Reviewed radiology reports from this admission including: chest xray  XR chest portable   ED Interpretation by Ida Salcedo DO (06/22 0428)   Mass in right upper lobe, known.  No acute cardiopulmonary process visualized.          EKG and Other Studies Reviewed on Admission: "   EKG: No EKG obtained.    ** Please Note: This note has been constructed using a voice recognition system. **

## 2024-06-22 NOTE — PLAN OF CARE
Problem: INFECTION - ADULT  Goal: Absence or prevention of progression during hospitalization  Description: INTERVENTIONS:  - Assess and monitor for signs and symptoms of infection  - Monitor lab/diagnostic results  - Monitor all insertion sites, i.e. indwelling lines, tubes, and drains  - Monitor endotracheal if appropriate and nasal secretions for changes in amount and color  - [place] appropriate cooling/warming therapies per order  - Administer medications as ordered  - Instruct and encourage patient and family to use good hand hygiene technique  - Identify and instruct in appropriate isolation precautions for identified infection/condition  Outcome: Progressing     Problem: SAFETY ADULT  Goal: Patient will remain free of falls  Description: INTERVENTIONS:  - Educate patient/family on patient safety including physical limitations  - Instruct patient to call for assistance with activity   - Consult OT/PT to assist with strengthening/mobility   - Keep Call bell within reach  - Keep bed low and locked with side rails adjusted as appropriate  - Keep care items and personal belongings within reach  - Initiate and maintain comfort rounds  - Consider moving patient to room near nurses station  Outcome: Progressing     Problem: Knowledge Deficit  Goal: Patient/family/caregiver demonstrates understanding of disease process, treatment plan, medications, and discharge instructions  Description: Complete learning assessment and assess knowledge base.  Interventions:  - Provide teaching at level of understanding  - Provide teaching via preferred learning methods  Outcome: Progressing

## 2024-06-23 PROBLEM — R65.10 SIRS (SYSTEMIC INFLAMMATORY RESPONSE SYNDROME) (HCC): Status: ACTIVE | Noted: 2024-06-22

## 2024-06-23 LAB
ANION GAP SERPL CALCULATED.3IONS-SCNC: 7 MMOL/L (ref 4–13)
BASOPHILS # BLD AUTO: 0.02 THOUSANDS/ÂΜL (ref 0–0.1)
BASOPHILS NFR BLD AUTO: 1 % (ref 0–1)
BUN SERPL-MCNC: 9 MG/DL (ref 5–25)
CALCIUM SERPL-MCNC: 8.7 MG/DL (ref 8.4–10.2)
CHLORIDE SERPL-SCNC: 102 MMOL/L (ref 96–108)
CO2 SERPL-SCNC: 26 MMOL/L (ref 21–32)
CREAT SERPL-MCNC: 1.01 MG/DL (ref 0.6–1.3)
EOSINOPHIL # BLD AUTO: 0.12 THOUSAND/ÂΜL (ref 0–0.61)
EOSINOPHIL NFR BLD AUTO: 4 % (ref 0–6)
ERYTHROCYTE [DISTWIDTH] IN BLOOD BY AUTOMATED COUNT: 15.2 % (ref 11.6–15.1)
FERRITIN SERPL-MCNC: 176 NG/ML (ref 11–307)
GFR SERPL CREATININE-BSD FRML MDRD: 55 ML/MIN/1.73SQ M
GLUCOSE SERPL-MCNC: 88 MG/DL (ref 65–140)
HCT VFR BLD AUTO: 30.7 % (ref 34.8–46.1)
HGB BLD-MCNC: 9.5 G/DL (ref 11.5–15.4)
IMM GRANULOCYTES # BLD AUTO: 0.01 THOUSAND/UL (ref 0–0.2)
IMM GRANULOCYTES NFR BLD AUTO: 0 % (ref 0–2)
IRON SATN MFR SERPL: 16 % (ref 15–50)
IRON SERPL-MCNC: 37 UG/DL (ref 50–212)
LYMPHOCYTES # BLD AUTO: 0.11 THOUSANDS/ÂΜL (ref 0.6–4.47)
LYMPHOCYTES NFR BLD AUTO: 4 % (ref 14–44)
MCH RBC QN AUTO: 30.9 PG (ref 26.8–34.3)
MCHC RBC AUTO-ENTMCNC: 30.9 G/DL (ref 31.4–37.4)
MCV RBC AUTO: 100 FL (ref 82–98)
MONOCYTES # BLD AUTO: 0.17 THOUSAND/ÂΜL (ref 0.17–1.22)
MONOCYTES NFR BLD AUTO: 6 % (ref 4–12)
NEUTROPHILS # BLD AUTO: 2.38 THOUSANDS/ÂΜL (ref 1.85–7.62)
NEUTS SEG NFR BLD AUTO: 85 % (ref 43–75)
NRBC BLD AUTO-RTO: 0 /100 WBCS
PLATELET # BLD AUTO: 208 THOUSANDS/UL (ref 149–390)
PMV BLD AUTO: 9.4 FL (ref 8.9–12.7)
POTASSIUM SERPL-SCNC: 4 MMOL/L (ref 3.5–5.3)
RBC # BLD AUTO: 3.07 MILLION/UL (ref 3.81–5.12)
SODIUM SERPL-SCNC: 135 MMOL/L (ref 135–147)
TIBC SERPL-MCNC: 237 UG/DL (ref 250–450)
UIBC SERPL-MCNC: 200 UG/DL (ref 155–355)
VIT B12 SERPL-MCNC: 426 PG/ML (ref 180–914)
WBC # BLD AUTO: 2.81 THOUSAND/UL (ref 4.31–10.16)

## 2024-06-23 PROCEDURE — 99232 SBSQ HOSP IP/OBS MODERATE 35: CPT | Performed by: INTERNAL MEDICINE

## 2024-06-23 PROCEDURE — 83540 ASSAY OF IRON: CPT | Performed by: INTERNAL MEDICINE

## 2024-06-23 PROCEDURE — 82607 VITAMIN B-12: CPT | Performed by: INTERNAL MEDICINE

## 2024-06-23 PROCEDURE — 85025 COMPLETE CBC W/AUTO DIFF WBC: CPT | Performed by: STUDENT IN AN ORGANIZED HEALTH CARE EDUCATION/TRAINING PROGRAM

## 2024-06-23 PROCEDURE — 83550 IRON BINDING TEST: CPT | Performed by: INTERNAL MEDICINE

## 2024-06-23 PROCEDURE — 82728 ASSAY OF FERRITIN: CPT | Performed by: INTERNAL MEDICINE

## 2024-06-23 PROCEDURE — 80048 BASIC METABOLIC PNL TOTAL CA: CPT | Performed by: STUDENT IN AN ORGANIZED HEALTH CARE EDUCATION/TRAINING PROGRAM

## 2024-06-23 RX ADMIN — Medication 250 MG: at 08:48

## 2024-06-23 RX ADMIN — Medication 1000 MCG: at 08:47

## 2024-06-23 RX ADMIN — ASPIRIN 81 MG 81 MG: 81 TABLET ORAL at 08:47

## 2024-06-23 RX ADMIN — DIPHENHYDRAMINE HYDROCHLORIDE 25 MG: 25 TABLET ORAL at 20:31

## 2024-06-23 RX ADMIN — PIPERACILLIN SODIUM AND TAZOBACTAM SODIUM 4.5 G: 36; 4.5 INJECTION, POWDER, LYOPHILIZED, FOR SOLUTION INTRAVENOUS at 02:54

## 2024-06-23 RX ADMIN — Medication 5000 UNITS: at 08:47

## 2024-06-23 RX ADMIN — ENOXAPARIN SODIUM 40 MG: 40 INJECTION SUBCUTANEOUS at 08:47

## 2024-06-23 NOTE — ASSESSMENT & PLAN NOTE
Home meds: amlodipine 5 mg and losartan 50 mg daily    Holding home antihypertensives in setting soft blood pressure and SIRS

## 2024-06-23 NOTE — ASSESSMENT & PLAN NOTE
Presents to ED due to fever noticed in the middle of the night  Pt expresses no complaints on initial eval  BP soft 110/60  On physical exam mild erythema noticed on the inner left jerome, non-tender, not warm to touch, was present since the last week after previous chemo infusion and currently resolving  Meets SIRS criteria with WBC < 4, fever 102.2 and tachycardia 110. Lactic acid 2.1  CXR: RUL opacification due to mass/ post radiation scarring. No acute cardiopulmonary dz.  S/p 1 L bolus and zosyn for 1 dose in the ED    Likely reactive to recent chemo rather than sepsis  Initially treated with Zosyn on admission d/t immunosuppression    Plan:  D/C Zosyn absent an infectious source  Monitor WBC and fever curve  F/u Bcx x2  Tylenol prn for fever  Monitor vitals per unit routine  Consider CT CAP if new fever episode occur  Consider resuming broad-spectrum abx if clinical decline, antifungal coverage if fever persists after 4-7 days of adequate abx coverage

## 2024-06-23 NOTE — PROGRESS NOTES
Formerly [facility], later [facility]  Progress Note  Name: Ayla Nye I  MRN: [id number]  Unit/Bed#: S -01 I Date of Admission: 6/22/2024   Date of Service: 6/23/2024 I Hospital Day: 1    Assessment & Plan   * SIRS (systemic inflammatory response syndrome) ([facility])  Assessment & Plan  Presents to ED due to fever noticed in the middle of the night  Pt expresses no complaints on initial eval  BP soft 110/60  On physical exam mild erythema noticed on the inner left jerome, non-tender, not warm to touch, was present since the last week after previous chemo infusion and currently resolving  Meets SIRS criteria with WBC < 4, fever 102.2 and tachycardia 110. Lactic acid 2.1  CXR: RUL opacification due to mass/ post radiation scarring. No acute cardiopulmonary dz.  S/p 1 L bolus and zosyn for 1 dose in the ED    Likely reactive to recent chemo rather than sepsis  Initially treated with Zosyn on admission d/t immunosuppression    Plan:  D/C Zosyn absent an infectious source  Monitor WBC and fever curve  F/u Bcx x2  Tylenol prn for fever  Monitor vitals per unit routine  Consider CT CAP if new fever episode occur  Consider resuming broad-spectrum abx if clinical decline, antifungal coverage if fever persists after 4-7 days of adequate abx coverage    Insomnia  Assessment & Plan  Pt is using benadryl for sleep at home as needed    Malignant neoplasm of upper lobe, right bronchus or lung (HCC)  Assessment & Plan  Diagnosed in march 2024  [facility] of the RUL lung cancer  Currently is being treated with radiation and chemo (carboplatin and paclitaxel), finished cycle 5 on 6/21/24  Follows with hematology in o/p setting      Stage 3b chronic kidney disease (CKD) (HCC)  Assessment & Plan  Lab Results   Component Value Date    EGFR 55 06/23/2024    EGFR 63 06/22/2024    EGFR 58 06/18/2024    CREATININE 1.01 06/23/2024    CREATININE 0.90 06/22/2024    CREATININE 0.97 06/18/2024     Patient creatinine at baseline. Avoid  hypotension and nephrotoxins. Monitor on AM BMP.     JAK2 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  Assessment & Plan  Hydroxyurea currently on hold as per  onc    HTN (hypertension), benign  Assessment & Plan  Home meds: amlodipine 5 mg and losartan 50 mg daily    Holding home antihypertensives in setting soft blood pressure and SIRS         VTE Pharmacologic Prophylaxis: VTE Score: 6 High Risk (Score >/= 5) - Pharmacological DVT Prophylaxis Ordered: apixaban (Eliquis). Sequential Compression Devices Ordered.    Mobility:   Basic Mobility Inpatient Raw Score: 24  JH-HLM Goal: 8: Walk 250 feet or more  JH-HLM Achieved: 8: Walk 250 feet ot more  JH-HLM Goal achieved. Continue to encourage appropriate mobility.    Patient Centered Rounds: I performed bedside rounds with nursing staff today.  Discussions with Specialists or Other Care Team Provider: None    Education and Discussions with Family / Patient: Updated  (daughter) via phone. (Sue)    Current Length of Stay: 1 day(s)  Current Patient Status: Inpatient   Discharge Plan: Anticipate discharge in 24-48 hrs to home.    Code Status: Level 1 - Full Code    Subjective:   This morning, Ms. Nye is seen lying up in bed, awake, alert, engaged with exam, in no acute, distress. She reports slight HA and chronic mild pain in the BLE. She otherwise offers no complaints at this time, and remainder of brief ROS is negative. No further fever since presentation. Discussed w patient that we will hold abx at this time and continue to monitor. Pt in agreement w plan.     Objective:     Vitals:   Temp (24hrs), Av.8 °F (37.1 °C), Min:98 °F (36.7 °C), Max:100 °F (37.8 °C)    Temp:  [98 °F (36.7 °C)-100 °F (37.8 °C)] 98.9 °F (37.2 °C)  HR:  [44-81] 81  Resp:  [16-20] 16  BP: (111-133)/(69-86) 111/86  SpO2:  [94 %-97 %] 97 %  Body mass index is 19.2 kg/m².     Input and Output Summary (last 24 hours):     Intake/Output Summary (Last 24 hours) at 2024 8817  Last data filed at  6/23/2024 0900  Gross per 24 hour   Intake 236 ml   Output --   Net 236 ml       Physical Exam:   Physical Exam  Vitals and nursing note reviewed.   Constitutional:       General: She is not in acute distress.     Appearance: Normal appearance. She is well-developed and normal weight. She is not ill-appearing, toxic-appearing or diaphoretic.   HENT:      Head: Normocephalic and atraumatic.   Eyes:      General: No scleral icterus.        Right eye: No discharge.         Left eye: No discharge.      Conjunctiva/sclera: Conjunctivae normal.   Cardiovascular:      Rate and Rhythm: Normal rate and regular rhythm.      Pulses: Normal pulses.      Heart sounds: Normal heart sounds. No murmur heard.     No friction rub. No gallop.   Pulmonary:      Effort: Pulmonary effort is normal. No respiratory distress.      Breath sounds: Normal breath sounds. No stridor. No wheezing, rhonchi or rales.   Chest:      Chest wall: No tenderness.   Abdominal:      General: Bowel sounds are normal. There is no distension.      Palpations: Abdomen is soft.      Tenderness: There is no abdominal tenderness. There is no guarding or rebound.   Musculoskeletal:         General: No swelling.      Right lower leg: No edema.      Left lower leg: No edema.   Skin:     General: Skin is warm and dry.   Neurological:      Mental Status: She is alert.   Psychiatric:         Mood and Affect: Mood normal.         Behavior: Behavior normal.        Additional Data:     Labs:  Results from last 7 days   Lab Units 06/23/24  0531 06/22/24  0310   WBC Thousand/uL 2.81* 3.74*   HEMOGLOBIN g/dL 9.5* 8.6*   HEMATOCRIT % 30.7* 27.4*   PLATELETS Thousands/uL 208 211   BANDS PCT %  --  1   SEGS PCT % 85*  --    LYMPHO PCT % 4* 1*   MONO PCT % 6 1*   EOS PCT % 4 0     Results from last 7 days   Lab Units 06/23/24  0531 06/22/24  0312   SODIUM mmol/L 135 135   POTASSIUM mmol/L 4.0 4.2   CHLORIDE mmol/L 102 105   CO2 mmol/L 26 22   BUN mg/dL 9 13   CREATININE mg/dL  1.01 0.90   ANION GAP mmol/L 7 8   CALCIUM mg/dL 8.7 8.9   ALBUMIN g/dL  --  3.6   TOTAL BILIRUBIN mg/dL  --  0.35   ALK PHOS U/L  --  63   ALT U/L  --  5*   AST U/L  --  8*   GLUCOSE RANDOM mg/dL 88 118     Results from last 7 days   Lab Units 06/22/24  0310   INR  0.95             Results from last 7 days   Lab Units 06/22/24  0542 06/22/24  0310   LACTIC ACID mmol/L 2.2* 2.1*   PROCALCITONIN ng/ml  --  0.08       Lines/Drains:  Invasive Devices       Peripheral Intravenous Line  Duration             Peripheral IV 06/22/24 Distal;Right;Ventral (anterior) Forearm 1 day                          Imaging: Reviewed radiology reports from this admission including: chest xray    Recent Cultures (last 7 days):   Results from last 7 days   Lab Units 06/22/24  0343 06/22/24  0312   BLOOD CULTURE  No Growth at 24 hrs. No Growth at 24 hrs.       Last 24 Hours Medication List:   Current Facility-Administered Medications   Medication Dose Route Frequency Provider Last Rate    acetaminophen  650 mg Oral Q6H PRN Elizabeth Castelan MD      aspirin  81 mg Oral Daily Elizabeth Castelan MD      Cholecalciferol  5,000 Units Oral Daily Elizabeth Castelan MD      vitamin B-12  1,000 mcg Oral Daily Elizabeth Castelan MD      diphenhydrAMINE  25 mg Oral Q12H PRN Elizabeth Castelan MD      enoxaparin  40 mg Subcutaneous Daily Elizabeth Castelan MD      lactated ringers  50 mL/hr Intravenous Continuous Elizabeth Castelan MD 50 mL/hr (06/22/24 1348)    saccharomyces boulardii  250 mg Oral BID Elizabeth Castelan MD      sucralfate  1 g Oral TID Elizabeth Castelan MD      zolpidem  2.5 mg Oral HS PRN Bro Roman MD       Facility-Administered Medications Ordered in Other Encounters   Medication Dose Route Frequency Provider Last Rate    [MAR Hold] alteplase  2 mg Intracatheter Q1MIN PRN Rosalinda Castro MD          Today, Patient Was Seen By: Joss Kevin,  MD    **Please Note: This note may have been constructed using a voice recognition system.**

## 2024-06-23 NOTE — ASSESSMENT & PLAN NOTE
Lab Results   Component Value Date    EGFR 55 06/23/2024    EGFR 63 06/22/2024    EGFR 58 06/18/2024    CREATININE 1.01 06/23/2024    CREATININE 0.90 06/22/2024    CREATININE 0.97 06/18/2024     Patient creatinine at baseline. Avoid hypotension and nephrotoxins. Monitor on AM BMP.

## 2024-06-23 NOTE — ASSESSMENT & PLAN NOTE
Diagnosed in march 2024  [facility] of the RUL lung cancer  Currently is being treated with radiation and chemo (carboplatin and paclitaxel), finished cycle 5 on 6/21/24  Follows with hematology in o/p setting

## 2024-06-24 ENCOUNTER — APPOINTMENT (OUTPATIENT)
Dept: RADIATION ONCOLOGY | Facility: HOSPITAL | Age: 74
End: 2024-06-24
Attending: INTERNAL MEDICINE
Payer: MEDICARE

## 2024-06-24 VITALS
WEIGHT: 104.5 LBS | HEART RATE: 92 BPM | RESPIRATION RATE: 16 BRPM | BODY MASS INDEX: 19.23 KG/M2 | OXYGEN SATURATION: 95 % | DIASTOLIC BLOOD PRESSURE: 75 MMHG | HEIGHT: 62 IN | SYSTOLIC BLOOD PRESSURE: 119 MMHG | TEMPERATURE: 97.7 F

## 2024-06-24 DIAGNOSIS — C34.91 NSCLC OF RIGHT LUNG (HCC): Primary | ICD-10-CM

## 2024-06-24 PROBLEM — R65.10 SIRS (SYSTEMIC INFLAMMATORY RESPONSE SYNDROME) (HCC): Status: RESOLVED | Noted: 2024-06-22 | Resolved: 2024-06-24

## 2024-06-24 LAB
ANION GAP SERPL CALCULATED.3IONS-SCNC: 4 MMOL/L (ref 4–13)
ATRIAL RATE: 98 BPM
BASOPHILS # BLD AUTO: 0.01 THOUSANDS/ÂΜL (ref 0–0.1)
BASOPHILS NFR BLD AUTO: 1 % (ref 0–1)
BUN SERPL-MCNC: 8 MG/DL (ref 5–25)
CALCIUM SERPL-MCNC: 8.7 MG/DL (ref 8.4–10.2)
CHLORIDE SERPL-SCNC: 105 MMOL/L (ref 96–108)
CO2 SERPL-SCNC: 28 MMOL/L (ref 21–32)
CREAT SERPL-MCNC: 0.77 MG/DL (ref 0.6–1.3)
EOSINOPHIL # BLD AUTO: 0.08 THOUSAND/ÂΜL (ref 0–0.61)
EOSINOPHIL NFR BLD AUTO: 4 % (ref 0–6)
ERYTHROCYTE [DISTWIDTH] IN BLOOD BY AUTOMATED COUNT: 15.1 % (ref 11.6–15.1)
GFR SERPL CREATININE-BSD FRML MDRD: 76 ML/MIN/1.73SQ M
GLUCOSE SERPL-MCNC: 91 MG/DL (ref 65–140)
HCT VFR BLD AUTO: 27.4 % (ref 34.8–46.1)
HGB BLD-MCNC: 8.6 G/DL (ref 11.5–15.4)
IMM GRANULOCYTES # BLD AUTO: 0.02 THOUSAND/UL (ref 0–0.2)
IMM GRANULOCYTES NFR BLD AUTO: 1 % (ref 0–2)
LACTATE SERPL-SCNC: 0.7 MMOL/L (ref 0.5–2)
LYMPHOCYTES # BLD AUTO: 0.17 THOUSANDS/ÂΜL (ref 0.6–4.47)
LYMPHOCYTES NFR BLD AUTO: 8 % (ref 14–44)
MCH RBC QN AUTO: 31.3 PG (ref 26.8–34.3)
MCHC RBC AUTO-ENTMCNC: 31.4 G/DL (ref 31.4–37.4)
MCV RBC AUTO: 100 FL (ref 82–98)
MONOCYTES # BLD AUTO: 0.16 THOUSAND/ÂΜL (ref 0.17–1.22)
MONOCYTES NFR BLD AUTO: 8 % (ref 4–12)
NEUTROPHILS # BLD AUTO: 1.58 THOUSANDS/ÂΜL (ref 1.85–7.62)
NEUTS SEG NFR BLD AUTO: 78 % (ref 43–75)
NRBC BLD AUTO-RTO: 0 /100 WBCS
P AXIS: 65 DEGREES
PLATELET # BLD AUTO: 182 THOUSANDS/UL (ref 149–390)
PMV BLD AUTO: 9.4 FL (ref 8.9–12.7)
POTASSIUM SERPL-SCNC: 3.8 MMOL/L (ref 3.5–5.3)
PR INTERVAL: 142 MS
PROCALCITONIN SERPL-MCNC: 0.16 NG/ML
QRS AXIS: 62 DEGREES
QRSD INTERVAL: 60 MS
QT INTERVAL: 332 MS
QTC INTERVAL: 423 MS
RBC # BLD AUTO: 2.75 MILLION/UL (ref 3.81–5.12)
SODIUM SERPL-SCNC: 137 MMOL/L (ref 135–147)
T WAVE AXIS: 53 DEGREES
VENTRICULAR RATE: 98 BPM
WBC # BLD AUTO: 2.02 THOUSAND/UL (ref 4.31–10.16)

## 2024-06-24 PROCEDURE — 99239 HOSP IP/OBS DSCHRG MGMT >30: CPT | Performed by: INTERNAL MEDICINE

## 2024-06-24 PROCEDURE — 77386 HB NTSTY MODUL RAD TX DLVR CPLX: CPT | Performed by: STUDENT IN AN ORGANIZED HEALTH CARE EDUCATION/TRAINING PROGRAM

## 2024-06-24 PROCEDURE — 84145 PROCALCITONIN (PCT): CPT | Performed by: INTERNAL MEDICINE

## 2024-06-24 PROCEDURE — 93010 ELECTROCARDIOGRAM REPORT: CPT | Performed by: INTERNAL MEDICINE

## 2024-06-24 PROCEDURE — 80048 BASIC METABOLIC PNL TOTAL CA: CPT | Performed by: INTERNAL MEDICINE

## 2024-06-24 PROCEDURE — 85025 COMPLETE CBC W/AUTO DIFF WBC: CPT | Performed by: INTERNAL MEDICINE

## 2024-06-24 PROCEDURE — 77014 CHG CT GUIDANCE RADIATION THERAPY FLDS PLACEMENT: CPT | Performed by: STUDENT IN AN ORGANIZED HEALTH CARE EDUCATION/TRAINING PROGRAM

## 2024-06-24 PROCEDURE — 83605 ASSAY OF LACTIC ACID: CPT | Performed by: INTERNAL MEDICINE

## 2024-06-24 RX ADMIN — Medication 1000 MCG: at 08:35

## 2024-06-24 RX ADMIN — ENOXAPARIN SODIUM 40 MG: 40 INJECTION SUBCUTANEOUS at 08:35

## 2024-06-24 RX ADMIN — Medication 5000 UNITS: at 08:35

## 2024-06-24 RX ADMIN — ASPIRIN 81 MG 81 MG: 81 TABLET ORAL at 08:35

## 2024-06-24 RX ADMIN — SODIUM CHLORIDE, SODIUM LACTATE, POTASSIUM CHLORIDE, AND CALCIUM CHLORIDE 50 ML/HR: .6; .31; .03; .02 INJECTION, SOLUTION INTRAVENOUS at 02:48

## 2024-06-24 NOTE — PLAN OF CARE
Problem: INFECTION - ADULT  Goal: Absence or prevention of progression during hospitalization  Description: INTERVENTIONS:  - Assess and monitor for signs and symptoms of infection  - Monitor lab/diagnostic results  - Monitor all insertion sites, i.e. indwelling lines, tubes, and drains  - Monitor endotracheal if appropriate and nasal secretions for changes in amount and color  - [place] appropriate cooling/warming therapies per order  - Administer medications as ordered  - Instruct and encourage patient and family to use good hand hygiene technique  - Identify and instruct in appropriate isolation precautions for identified infection/condition  Outcome: Progressing     Problem: SAFETY ADULT  Goal: Patient will remain free of falls  Description: INTERVENTIONS:  - Educate patient/family on patient safety including physical limitations  - Instruct patient to call for assistance with activity   - Consult OT/PT to assist with strengthening/mobility   - Keep Call bell within reach  - Keep bed low and locked with side rails adjusted as appropriate  - Keep care items and personal belongings within reach  - Initiate and maintain comfort rounds  - Consider moving patient to room near nurses station  Outcome: Progressing     Problem: Knowledge Deficit  Goal: Patient/family/caregiver demonstrates understanding of disease process, treatment plan, medications, and discharge instructions  Description: Complete learning assessment and assess knowledge base.  Interventions:  - Provide teaching at level of understanding  - Provide teaching via preferred learning methods  Outcome: Progressing

## 2024-06-24 NOTE — QUICK NOTE
Radiation Oncology Treatment Note     A treatment dose of 200 cGy was administered today to the involved tumor site(s) Right Lung and [place] using 1-10 MV photons.  Present total dose at this time is 4400  cGy.  Approximately 8 more treatment before completion of treatments or critical review.

## 2024-06-24 NOTE — ASSESSMENT & PLAN NOTE
Presents to ED due to fever noticed in the middle of the night  Pt expresses no complaints on initial eval  BP soft 110/60  On physical exam mild erythema noticed on the inner left jerome, non-tender, not warm to touch, was present since the last week after previous chemo infusion and currently resolving  Meets SIRS criteria with WBC < 4, fever 102.2 and tachycardia 110. Lactic acid 2.1  CXR: RUL opacification due to mass/ post radiation scarring. No acute cardiopulmonary dz.  S/p 1 L bolus and zosyn for 1 dose in the ED    Likely reactive to recent chemo rather than sepsis  Initially treated with Zosyn on admission d/t immunosuppression    Plan  Tylenol prn for fever  Monitor vitals per unit routine

## 2024-06-24 NOTE — DISCHARGE INSTR - AVS FIRST PAGE
Dear Ayla Nye,     It was our pleasure to care for you here at [facility].  It is our hope that we were always able to exceed the expected standards for your care during your stay.  You were hospitalized due to fever.  You were cared for on the MedSurg floor by Abdiel Ross MD under the service of Bro Roman MD with the [facility] who covers for your primary care physician (PCP), Rafy Angelo MD, while you were hospitalized.  If you have any questions or concerns related to this hospitalization, you may contact us at .  For follow up as well as any medication refills, we recommend that you follow up with your primary care physician.  A registered nurse will reach out to you by phone within a few days after your discharge to answer any additional questions that you may have after going home.  However, at this time we provide for you here, the most important instructions / recommendations at discharge:     Notable Medication Adjustments -   Please continue to hold your BP medications at this point until after you see your PCP within 1 week of discharge.  For your diarrhea, you may take yogurt, banana, Kefir  Testing Required after Discharge -   None  ** Please contact your PCP to request testing orders for any of the testing recommended here **  Important follow up information -   Please see your PCP within 1 week of discharge.  Please see your medical oncologist further decision making on continuing chemotherapy.  Other Instructions -   We hope you feel better soon. If your symptoms worsen, please call 911 immediately or go to the nearest Emergency Department.   Please review this entire after visit summary as additional general instructions including medication list, appointments, activity, diet, any pertinent wound care, and other additional recommendations from your care team that may be provided for you.      Sincerely,      Abdiel Ross MD    Yes

## 2024-06-24 NOTE — DISCHARGE SUMMARY
[facility]  Discharge- Ayla Nye 1950, 73 y.o. female MRN: [id number]  Unit/Bed#: S -01 Encounter: [id number]  Primary Care Provider: Rafy Angelo MD   Date and time admitted to hospital: 6/22/2024  2:45 AM    Insomnia  Assessment & Plan  Pt is using benadryl for sleep at home as needed    Malignant neoplasm of upper lobe, right bronchus or lung (HCC)  Assessment & Plan  Diagnosed in march 2024  [facility] of the RUL lung cancer  Currently is being treated with radiation and chemo (carboplatin and paclitaxel), finished cycle 5 on 6/21/24  Follows with hematology in o/p setting      Stage 3b chronic kidney disease (CKD) (HCC)  Assessment & Plan  Lab Results   Component Value Date    EGFR 76 06/24/2024    EGFR 55 06/23/2024    EGFR 63 06/22/2024    CREATININE 0.77 06/24/2024    CREATININE 1.01 06/23/2024    CREATININE 0.90 06/22/2024     Patient creatinine at baseline. Avoid hypotension and nephrotoxins. Monitor on AM BMP.     JAK2 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  Assessment & Plan  Hydroxyurea currently on hold as per  onc    HTN (hypertension), benign  Assessment & Plan  Home meds: amlodipine 5 mg and losartan 50 mg daily    Holding home antihypertensives in setting soft blood pressure and SIRS    * SIRS (systemic inflammatory response syndrome) (HCC)-resolved as of 6/24/2024  Assessment & Plan  Presents to ED due to fever noticed in the middle of the night  Pt expresses no complaints on initial eval  BP soft 110/60  On physical exam mild erythema noticed on the inner left jerome, non-tender, not warm to touch, was present since the last week after previous chemo infusion and currently resolving  Meets SIRS criteria with WBC < 4, fever 102.2 and tachycardia 110. Lactic acid 2.1  CXR: RUL opacification due to mass/ post radiation scarring. No acute cardiopulmonary dz.  S/p 1 L bolus and zosyn for 1 dose in the ED    Likely reactive to recent chemo rather than sepsis  Initially treated  with Zosyn on admission d/t immunosuppression    Plan  Tylenol prn for fever  Monitor vitals per unit routine      Medical Problems       Resolved Problems  Date Reviewed: 6/24/2024            Resolved    * (Principal) SIRS (systemic inflammatory response syndrome) (HCC) 6/24/2024     Resolved by  Abdiel Ross MD        Discharging Resident: Abdiel Ross MD  Discharging Attending: Bro Roman MD  PCP: Rafy Angelo MD  Admission Date:   Admission Orders (From admission, onward)       Ordered        06/22/24 0510  INPATIENT ADMISSION  Once                          Discharge Date: 06/24/24    Consultations During Hospital Stay:  Radiation oncology    Procedures Performed:   Radiation therapy as per schedul    Significant Findings / Test Results:   XR chest portable   ED Interpretation by Ida Salcedo DO (06/22 0428)   Mass in right upper lobe, known.  No acute cardiopulmonary process visualized.      Final Result by Shana Yan MD (06/22 0712)      No acute cardiopulmonary disease.      Redemonstration of 4 cm right upper lobe mass.      Workstation performed: [id number]              Incidental Findings:   None    Test Results Pending at Discharge (will require follow up):  None     Outpatient Tests Requested:  None    Complications: None    Reason for Admission: Fever, rigors    Hospital Course:   Ayla Nye is a 73 y.o. female patient who originally presented to the hospital on 6/22/2024 due to fever, rigors.  She also endorsed fatigue which was post chemo.  In the ED, she was found to be fulfilling the SIRS criteria given her low white count, she was empirically started on broad-spectrum antibiotic.  On the following day antibiotics were stopped as she continued to improve.  During her hospitalization, she also developed diarrhea which was to be noninfective in origin.  She also received her scheduled radiation therapy.  Her overall hospital stay remains uneventful.  At the time  "of discharge, her overall condition is fair to stable.    Please see above list of diagnoses and related plan for additional information.     Condition at Discharge: fair    Discharge Day Visit / Exam:   Subjective: Patient mentions that she is having diarrhea with about 3-4 frequency with mushy /soft consistency  Vitals: Blood Pressure: 119/75 (06/24/24 0837)  Pulse: 92 (06/24/24 0837)  Temperature: 97.7 °F (36.5 °C) (06/24/24 0837)  Temp Source: Oral (06/22/24 2224)  Respirations: 16 (06/24/24 0837)  Height: 5' 2\" (157.5 cm) (06/22/24 1346)  Weight - Scale: 47.4 kg (104 lb 8 oz) (06/24/24 0600)  SpO2: 95 % (06/24/24 0837)  Exam:   Physical Exam  Vitals and nursing note reviewed.   Constitutional:       General: She is not in acute distress.     Appearance: She is well-developed.   HENT:      Head: Normocephalic and atraumatic.   Eyes:      Conjunctiva/sclera: Conjunctivae normal.   Cardiovascular:      Rate and Rhythm: Normal rate and regular rhythm.      Heart sounds: No murmur heard.  Pulmonary:      Effort: Pulmonary effort is normal. No respiratory distress.      Breath sounds: Normal breath sounds.   Abdominal:      Palpations: Abdomen is soft.      Tenderness: There is no abdominal tenderness.   Musculoskeletal:         General: No swelling.      Cervical back: Neck supple.   Skin:     General: Skin is warm and dry.      Capillary Refill: Capillary refill takes less than 2 seconds.   Neurological:      Mental Status: She is alert.   Psychiatric:         Mood and Affect: Mood normal.          Discussion with Family: Updated  (daughter) via phone.    Discharge instructions/Information to patient and family:   See after visit summary for information provided to patient and family.      Provisions for Follow-Up Care:  See after visit summary for information related to follow-up care and any pertinent home health orders.      Mobility at time of Discharge:   Basic Mobility Inpatient Raw Score: " 24  JH-HLM Goal: 8: Walk 250 feet or more  JH-HLM Achieved: 8: Walk 250 feet ot more  HLM Goal achieved. Continue to encourage appropriate mobility.     Disposition:   Home    Planned Readmission: No    Discharge Medications:  See after visit summary for reconciled discharge medications provided to patient and/or family.      **Please Note: This note may have been constructed using a voice recognition system**

## 2024-06-24 NOTE — ASSESSMENT & PLAN NOTE
Lab Results   Component Value Date    EGFR 76 06/24/2024    EGFR 55 06/23/2024    EGFR 63 06/22/2024    CREATININE 0.77 06/24/2024    CREATININE 1.01 06/23/2024    CREATININE 0.90 06/22/2024     Patient creatinine at baseline. Avoid hypotension and nephrotoxins. Monitor on AM BMP.

## 2024-06-25 ENCOUNTER — APPOINTMENT (OUTPATIENT)
Dept: RADIATION ONCOLOGY | Facility: HOSPITAL | Age: 74
End: 2024-06-25
Attending: INTERNAL MEDICINE
Payer: MEDICARE

## 2024-06-25 ENCOUNTER — TELEPHONE (OUTPATIENT)
Age: 74
End: 2024-06-25

## 2024-06-25 ENCOUNTER — TRANSITIONAL CARE MANAGEMENT (OUTPATIENT)
Dept: FAMILY MEDICINE CLINIC | Facility: CLINIC | Age: 74
End: 2024-06-25

## 2024-06-25 PROCEDURE — 77386 HB NTSTY MODUL RAD TX DLVR CPLX: CPT | Performed by: RADIOLOGY

## 2024-06-25 PROCEDURE — 77014 CHG CT GUIDANCE RADIATION THERAPY FLDS PLACEMENT: CPT | Performed by: RADIOLOGY

## 2024-06-25 NOTE — TELEPHONE ENCOUNTER
Patient called in and wants a call back from nurse Bailey, would like to discuss upcoming appt on Thursday and her concerns after being in the hospital with COVID.

## 2024-06-25 NOTE — TELEPHONE ENCOUNTER
Patient is scheduled for a f/u appt with Dr. Castro on Monday, July 1st. She has concerns about chemo treatment this week as the  who took her home from the hospital yesterday, tested positive for COVID. Patient made aware that COVID has an incubation period of 4 to 7 days. Patient's chemotherapy treatments for this week are to be held. Patient was scheduled for July 2nd at 11 am at hospitals infusion. Note will be sent to radiation as patient is scheduled to see radiation at [place] around 11 on 7/2.

## 2024-06-26 ENCOUNTER — APPOINTMENT (OUTPATIENT)
Dept: RADIATION ONCOLOGY | Facility: HOSPITAL | Age: 74
End: 2024-06-26
Attending: INTERNAL MEDICINE
Payer: MEDICARE

## 2024-06-26 ENCOUNTER — PATIENT OUTREACH (OUTPATIENT)
Dept: HEMATOLOGY ONCOLOGY | Facility: CLINIC | Age: 74
End: 2024-06-26

## 2024-06-26 PROCEDURE — 77386 HB NTSTY MODUL RAD TX DLVR CPLX: CPT | Performed by: STUDENT IN AN ORGANIZED HEALTH CARE EDUCATION/TRAINING PROGRAM

## 2024-06-26 PROCEDURE — 77014 CHG CT GUIDANCE RADIATION THERAPY FLDS PLACEMENT: CPT | Performed by: STUDENT IN AN ORGANIZED HEALTH CARE EDUCATION/TRAINING PROGRAM

## 2024-06-26 NOTE — PROGRESS NOTES
At this time patient does not have any barriers to care and ends TX on 7/2 and 7/5. I informed Ayla if she needs anything she is welcome to call me. I will sign off patients care team due to patient navigation not needed at this time.

## 2024-06-26 NOTE — PROGRESS NOTES
ASSESSMENT      Are you having any side effects from your treatment?  - Fatigued and SOB at times- pt addressed this with the doctor and stated it was okay otherwise no side effects     Are you eating and drinking properly?  -Yes -Offered referral to see a dietician however pt declined nutrition services     Are you having any pain?  -No    Have needs changed for a palliative care referral?  -No    Do you know when your upcoming appointments are?  -  Yes   Do you have a good support system?  -yes     Are you interested in any support groups?  - no    Are there any changes in barriers to care?  -no     Do you have any questions or concerns regarding your treatment plan?  -07/2 last chemo 7/5 last radiation TXMila Aguila stated she has been doing well. She was very appreciative of my call.

## 2024-06-27 ENCOUNTER — RA CDI HCC (OUTPATIENT)
Dept: OTHER | Facility: HOSPITAL | Age: 74
End: 2024-06-27

## 2024-06-27 ENCOUNTER — APPOINTMENT (OUTPATIENT)
Dept: RADIATION ONCOLOGY | Facility: HOSPITAL | Age: 74
End: 2024-06-27
Attending: INTERNAL MEDICINE
Payer: MEDICARE

## 2024-06-27 ENCOUNTER — APPOINTMENT (OUTPATIENT)
Dept: RADIATION ONCOLOGY | Facility: HOSPITAL | Age: 74
End: 2024-06-27
Payer: MEDICARE

## 2024-06-27 ENCOUNTER — HOSPITAL ENCOUNTER (OUTPATIENT)
Dept: INFUSION CENTER | Facility: CLINIC | Age: 74
End: 2024-06-27

## 2024-06-27 LAB
BACTERIA BLD CULT: NORMAL
BACTERIA BLD CULT: NORMAL

## 2024-06-27 PROCEDURE — 77014 CHG CT GUIDANCE RADIATION THERAPY FLDS PLACEMENT: CPT | Performed by: RADIOLOGY

## 2024-06-27 PROCEDURE — 77386 HB NTSTY MODUL RAD TX DLVR CPLX: CPT | Performed by: RADIOLOGY

## 2024-06-27 PROCEDURE — 77336 RADIATION PHYSICS CONSULT: CPT | Performed by: INTERNAL MEDICINE

## 2024-06-27 NOTE — PROGRESS NOTES
HEMATOLOGY / ONCOLOGY CLINIC FOLLOW UP NOTE    Patient Ayla Nye  MRN: [id number]  : 1950  Date of Encounter 2024             Reason for Encounter: follow up ET and new lung mass on CRT C3 of 6 (2024)      C1 2024  C2  2024  C3 2024  C4 2024  C5   2024  C6-postponed due to hospitalization/neutropenic fever without source     C6 now to be given 2024           Oncology History   Malignant neoplasm of upper lobe, right bronchus or lung (HCC)    Initial Diagnosis    Primary lung adenocarcinoma, right (HCC)     3/26/2024 Biopsy    A-C. Lymph Node, Level 4R :    - Metastatic non-small cell carcinoma, most compatible with a primary lung adenocarcinoma; see note.       D-F. Lymph Node, Level 10R:    - Metastatic non-small cell carcinoma; see note.       G-I. Lymph Node, Level 4L:    - Metastatic non-small cell carcinoma; see note.       4/15/2024 -  Cancer Staged    Staging form: Lung, AJCC 8th Edition  - Clinical stage from 4/15/2024: Stage IIIB (cT2b, cN3, cM0) - Signed by Rogelio Beebe DO on 4/15/2024       2024 -  Chemotherapy    alteplase (CATHFLO), 2 mg, Intracatheter, Every 1 Minute as needed, 5 of 6 cycles  CARBOplatin (PARAPLATIN) IVPB (GOG AUC DOSING), 130.4 mg, Intravenous, Once, 5 of 6 cycles  Administration: 130.4 mg (2024), 144.8 mg (2024), 138.4 mg (2024), 144.8 mg (2024), 132 mg (2024)  PACLItaxel (TAXOL) chemo IVPB, 45 mg/m2 = 67.2 mg (90 % of original dose 50 mg/m2), Intravenous, Once, 5 of 6 cycles  Dose modification: 45 mg/m2 (original dose 50 mg/m2, Cycle 1, Reason: Anticipated Tolerance)  Administration: 67.2 mg (2024), 67.2 mg (2024), 67.2 mg (2024), 67.2 mg (2024), 67.2 mg (2024)              Discussion      cT2b N3 M0 Stage IIIB adenocarcinoma lung        Caris     Kras C12V, p53  PDL1 TPS 5%  TMB 16 mut/Mb   Other  mutations negative            The optimal therapy of  resectable Stage III NSCLC consists of systemic treatment combined with local approach with radiation and/or surgery.  Strategies include surgery followed by adjuvant chemotherapy  neoadjuvant chemotherapy or chemoimmunotherapy followed by surgery, neoadjuvant chemoradiotherapy followed by surgery or concurrent or sequential chemotherapy with definitive radiation therapy.      The accepted standard remains concurrent chemoradiotherapy although this is being challenged     The potential benefit of adding surgery to combined chemoradiotherapy for stage IIIA disease was noted in the Intergroup 0139 trial.  T1-3N2 NSCLC were assigned to concurrent chemorads 45 Gy with two cycles of Cisplatin/Etoposide followed by either surgery of continued chemorads.  PFS was longer in the surgery arm with no OS difference.      The two chemotherapy regimens that have been most commonly used in the [place] are the combination of cisplatin and etoposide and weekly carboplatin and paclitaxel:     Concurrent cisplatin (50 mg/m2 on days 1, 8, 29, and 36) plus etoposide (50 mg/m2 daily on days 1 to 5, and 29 to 33) with thoracic RT followed by two cycles of cisplatin plus etoposide was evaluated in a multicenter phase II trial of 50 patients with pathologically confirmed stage IIIB disease  With an average follow-up of 52 months, the three- and five-year survival rates were 17 and 15 percent, respectively. Treatment was complicated by grade 4 neutropenia in 32 percent and grade 3 or 4 esophagitis in 20 percent of patients. In a subsequent phase II trial, better results were seen with the same concurrent chemoradiation regimen followed by docetaxel consolidation, but the use of consolidation chemotherapy in this setting has been called into question by subsequent data       In a randomized phase II trial, carboplatin (area under the curve [AUC] = 2) and paclitaxel (45 mg/m2) given weekly with radiotherapy (63 Gy) followed by two cycles  "of consolidation therapy (carboplatin AUC = 6, paclitaxel 200 mg/m2) resulted in a median survival of 16.3 months.         Thus, for chemoradiation, the choices are cisplatin plus etoposide, in conjunction with once daily RT to a total of 60 Gy. An alternative \"radiosensitizing chemotherapy\" approach uses weekly carboplatin plus paclitaxel with approximately 60 Gy of radiation, followed by two cycles of consolidation with this same chemotherapy combination at standard systemic doses. The combination of pemetrexed and platinum agents has emerged as another acceptable alternative for stage III patients with nonsquamous cell histology.      Based on preclinical evidence demonstrating synergy between RT and IO, the Phase III [facility] trial used a PDL1 inhibitor Durvalumab after concurrent CRT in a randomized study involving 713 patients.  After median follow up of 34 months giving drug every 2 weeks for 12 months as consolidation, the use of durvalumab significantly improved PFR from 5.6 to 16.9 months and led to a significant improvement in OS with HR 0.72.  The incidence of new mets was also lower including brain mets.      Grade 3 or 4 adverse events occurred in 31 percent of the patients who received durvalumab and 26 percent of those who received placebo, with the most common severe adverse event being pneumonia. Adverse events of any grade that were of special interest, regardless of cause, were reported in 66 percent of patients receiving durvalumab versus 49 percent of those receiving placebo, most of which were grade 1 or 2. Such events for durvalumab versus placebo included diarrhea (18 versus 19 percent), pneumonitis (13 versus 8 percent), rash (12 versus 7 percent), and pruritus (12 versus 5 percent). Adverse events of special interest for which patients received concomitant treatment were reported in 42 versus 17 of patients receiving durvalumab and placebo, respectively, with treatments typically including " steroids, endocrine agents, and occasionally other immunosuppressive agents. Patient-reported quality of life was comparable between the two groups      These results demonstrate efficacy and tolerability of durvalumab for treatment of unresectable stage III NSCLC in patients who experience an objective response or stable disease following completion of chemoradiotherapy.     For this patient weekly Carboplatin AUC 2 and Taxol 45 mg/m2 weekly x 6 weeks with RT to 60-63 Gy will be used.  IO therapy, based on response, will be considered per the [facility] trial as above        Assessment/Plan:     Ms. 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  She has been tolerating Hydrea 500 mg once daily Monday through Friday and off on Saturday and Sunday. Patient also with 4.1 cm RUL mass 1.5cm spiculated posterior nodule mediastinal adenopathy, no metastatic disease including brain MRI new diagnosis      ET:      hydroxyurea (HYDREA) 500 mg capsule; continue daily 500 mg 7 days per week; once treatment starts will hold-has been held since 5/23/2024     Plts 498     Plts off hydrea 388 (were 498 then 434)     NSCLC/adenocarcinoma;mutational analysis pending      Now with RUL 4.2 cm mass with mediastinal adenopathy at least cT2a cN2 cM0 lung cancer     cT2b N3 Stage IIIB lung      Please see above regarding discussion      Patient to get C5/6 on the 20 th June 2024     Carboplatin AUC 2 Taxol 45 mg/m2 weekly with RT     Durvalumab adjuvant post treatment depending on response      Doing well, minimal numbness in feet/esophagitis     7/1/2024    Was admitted 6/22-6/24/2024 for neutropenic fever    WBC 2.5 ANC 1810 Hgb 9.0 plts 240 (hx ET/off hydrea)    Continue with C6 7/2/2024; last radiation 7/5/2024        IgG deficiency     IgG mildly low at 571.  This is not significantly decreased enough to cause the recurrent infections.  Will continue to monitor on  subsequent labs.  No need for intervention at this time.     - Comprehensive metabolic panel; Future  - IgG, IgA, IgM; Future      Follow up     2 weeks                   History of present illness:  Initial Visit 4/10/2024     Ayla Nye is a 73-year-old female with past medical history of hypertension, psoriatic arthritis, CKD stage III, hyperlipidemia, and latent TB.  She is seen in follow-up for essential thrombocytosis.  She has had an elevated platelet count dating back to January 2028 all of her other cell lines were within normal limits.  Her highest platelet count was around 700,000.  Myeloproliferative work-up demonstrated positive JAK2 mutation and she was started on 500 mg of Hydrea daily in July 2020.  Due to leukopenia her dose was reduced to Monday, Wednesday, Friday.  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  Then again in July we had to decrease her dose and she is taking 500 mg once daily Monday through Friday and not taking any on Saturday or Sunday.     She has been tolerating the 500 mg of Hydrea Monday through Friday off Saturday and Sunday well without any side effects.  She was seen by my attending and underwent further workup of her recurrent infections and was found to have a mildly low IgG level.   She has had pneumonia twice once in October and then again in February.  She states that she has had multiple imaging of her chest which does suggest scarring which prompted a CT scan of her chest .  .  She is also had recurrent sinus infections.  She is a former smoker and quit 15 years ago bit was 1[[d x more than 30 years.        She does not have any continued symptoms of pneumonia and no cough, shortness of breath, or fevers.       She underwent the following testing; EBUS with pulmonary as well as  CT chest/PET scan     Lung mass on CT chest 2/28/2024     LUNGS:     -4.1 x 3.4 cm solid mass in the anterior right upper lobe (series 302, image 73).     -Posterior to this mass, there is a 1.5 x 1.5 cm spiculated nodule (series 302, image 78)     -2.5 x 2.1 cm groundglass nodule in the anterior left upper lobe (series 302, image 90)     Mild emphysema.     Right apical pleural-parenchymal scarring.     PLEURA: Small calcified left posterior pleural plaque, which may be from prior asbestos exposure or the sequela of old inflammation.     HEART/GREAT VESSELS: Normal heart size. Mild-moderate coronary artery calcification. Mild to moderate mitral annular calcification. Trace pericardial effusion. No thoracic aortic aneurysm.     MEDIASTINUM AND SUDEEP: 1.6 x 2.0 cm right lower paratracheal/precarinal lymph node. 1.3 x 1.3 cm right lower paratracheal lymph node.        CT PET  3/28/2024     Intensely FDG avid right upper lobe mass, SUV max of 18. This abuts the anterior pleural margin. Central photopenia suggest necrosis. Mass measures on the order of 4.2 cm in size, stable previously 4.1 cm.     Subjacent 1.5 cm spiculated nodule posteriorly demonstrates minimal uptake, SUV max of 1.7.     Minimal FDG uptake in the left upper lobe groundglass nodule, SUV max of 2.2. This measured up to 2.5 cm in size on recent CT, appears grossly stable on low-dose CT.     A few FDG-avid mediastinal lymph nodes. Right anterior paratracheal lymph node demonstrates SUV max of 13. This measures up to 1.9 cm short axis image 85 series 3, may be slightly larger previously 1.6 cm.     A right perihilar lymph node demonstrates SUV max of 12. This measures on the order of 1.2 cm short axis image 89 series 3.  CT images: Scattered emphysematous changes of the lung fields. Mild to moderate coronary artery calcifications. Minimal left pleural calcification. Small calcified lymph nodes in the left perihilar region        3/26/2024     -C. Lymph  Node, Level 4R (ThinPrep, smears and cell block preparations):    - Metastatic non-small cell carcinoma, most compatible with a primary lung adenocarcinoma; see note.    - Satisfactory for evaluation.     D-F. Lymph Node, Level 10R (ThinPrep, smears and cell block preparations):    - Metastatic non-small cell carcinoma; see note.    - Satisfactory for evaluation.     G-I. Lymph Node, Level 4L (ThinPrep, smears and cell block preparations):    - Metastatic non-small cell carcinoma; see note.    - Satisfactory for evaluation.               Interval History:  6/3/2024     Ms Nye is tolerating treatment fairly well but did have a reaction to Taxol at C2.  She has flushing and chest pain; drug was stopped she was given Benadryl/steroids/fluids and started at slower rate.  She has not had issues with N/V but with constipation.  Her joints are more painful.  She is eating but taste is an issue, probably due to Carboplatin.  She denies any GERD but has increased cough which is from CRT.  She has no SOB.  We discussed Mucinex to help cut the phlegm generated from treatment.  Her weight is 110 pounds which is slightly decreased; her BP is 118/64. She has no other issues          Interval History:  6/17/2024     Doing well; in week 5.  Has esophagitis and carafate slurry  was prescribed; she has not started it.  No N/V, has minimal numbness mostly feet.  Had issue with veins and extravasation fluid left lower arm.  Some reddness, healing, no infection.  Labs ok plts 388 WBC 5.9 Hgb 9.4.  States psoriasis is better.  Her weight is decreased to 104 but she is eating but less.       Interval History:  7/1/2024 6/22/2024 admitted due to neutropenic  fever, rigors.  She also endorsed fatigue which was post chemo.  In the ED, she was found to be fulfilling the SIRS criteria given her low white count, she was empirically started on broad-spectrum antibiotic.  On the following day antibiotics were stopped as she continued to  improve.  During her hospitalization, she also developed diarrhea which was to be noninfective in origin.  She also received her scheduled radiation therapy  Discharged 6/24/2024; chemotherapy was held for neutropenia    She is doing better today; very anxious to stop treatment this week.  Feeling improved from admission.     Labs improved as above WBC 2.5 ANC 1810 plts 240 Na 141 k 4/1 Cr 0.85 ALT/AST 8/10     REVIEW OF SYSTEMS:  Please note that a 14-point review of systems was performed to include Constitutional, HEENT, Respiratory, CVS, GI, , Musculoskeletal, Integumentary, Neurologic, Rheumatologic, Endocrinologic, Psychiatric, Lymphatic, and Hematologic/Oncologic systems were reviewed and are negative unless otherwise stated in HPI. Positive and negative findings pertinent to this evaluation are incorporated into the history of present illness.      ECOG PS: 0-1    PROBLEM LIST:  Patient Active Problem List   Diagnosis    TB lung, latent    Psoriatic arthritis (HCC)    Essential thrombocytosis (HCC)    HTN (hypertension), benign    Mixed hyperlipidemia    Encounter for monitoring of hydroxyurea therapy    JAK2 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    Age-related osteoporosis without current pathological fracture    Stage 3b chronic kidney disease (CKD) (HCC)    Malignant neoplasm of upper lobe, right bronchus or lung (HCC)    Bilateral leg pain    Insomnia    Moderate protein-calorie malnutrition (HCC)    Dehydration       Past Medical History:   has a past medical history of Allergic (2022), Cataract (Nov. 2023), Essential thrombocytosis (HCC), Hyperlipidemia, Hypertension, Mass in chest, Nodular goiter, Pneumonia (Oct 16, 2023), Psoriasis, and SIRS (systemic inflammatory response syndrome) (HCC) (06/22/2024).    PAST SURGICAL HISTORY:   has a past surgical history that includes Appendectomy; Mammo needle localization right (all inc) (Right, 07/13/2009); Skin lesion excision; Colonoscopy (10/31/2018); Mammo (historical); DXA  procedure(historical) (04/21/2017); and Breast cyst excision (Right, 2009).    CURRENT MEDICATIONS  Current Outpatient Medications   Medication Sig Dispense Refill    aspirin 81 MG tablet Take 81 mg by mouth daily       Cholecalciferol (VITAMIN D3) 5000 units TABS 5,000 Units Daily       diphenhydrAMINE (BENADRYL) 25 mg capsule Take 25 mg by mouth every 12 (twelve) hours as needed for itching       ondansetron (ZOFRAN) 8 mg tablet Take 1 tablet (8 mg total) by mouth every 8 (eight) hours as needed for nausea or vomiting 30 tablet 1    Probiotic Product (PROBIOTIC DAILY PO) Take 1 capsule by mouth daily      prochlorperazine (COMPAZINE) 10 mg tablet Take 1 tablet (10 mg total) by mouth every 6 (six) hours as needed for nausea or vomiting 30 tablet 1    sucralfate (CARAFATE) 1 g tablet Take 1 tablet (1 g total) by mouth 3 (three) times a day Crush 1g tab in 5 mL water, 3x daily as needed 30 tablet 1    vitamin B-12 (VITAMIN B-12) 1,000 mcg tablet Take 1 tablet (1,000 mcg total) by mouth daily 90 tablet 1     No current facility-administered medications for this visit.     [unfilled]    SOCIAL HISTORY:   reports that she has quit smoking. Her smoking use included cigarettes. She started smoking about 58 years ago. She has a 58.5 pack-year smoking history. She has never used smokeless tobacco. She reports that she does not currently use alcohol. She reports that she does not use drugs.     FAMILY HISTORY:  family history includes Cancer in her brother and brother; Coronary artery disease in her brother; Depression in her mother; Hearing loss in her mother; Hypertension in her brother and mother; No Known Problems in her daughter, daughter, maternal aunt, maternal aunt, maternal aunt, maternal aunt, maternal grandfather, maternal grandmother, paternal aunt, paternal grandfather, and paternal grandmother; Stroke in her mother; Tuberculosis in her brother and father.     ALLERGIES:  is allergic to doxycycline, keflex  [cephalexin], and neomycin-polymyxin-dexameth.      Physical Exam:  Vital Signs:   Visit Vitals  OB Status Postmenopausal   Smoking Status Former     There is no height or weight on file to calculate BMI.  There is no height or weight on file to calculate BSA.    GEN: Alert, awake oriented x3, in no acute distress  HEENT- No pallor, icterus, cyanosis, no oral mucosal lesions,   LAD - no palpable cervical, clavicle, axillary, inguinal LAD  Heart- normal S1 S2, regular rate and rhythm, No murmur, rubs.   Lungs- clear breathing sound bilateral.   Abdomen- soft, Non tender, bowel sounds present  Extremities- No cyanosis, clubbing, edema  Neuro- No focal neurological deficit    Labs:  Lab Results   Component Value Date    WBC 2.02 (L) 06/24/2024    HGB 8.6 (L) 06/24/2024    HCT 27.4 (L) 06/24/2024     (H) 06/24/2024     06/24/2024     Lab Results   Component Value Date    SODIUM 137 06/24/2024    K 3.8 06/24/2024     06/24/2024    CO2 28 06/24/2024    AGAP 4 06/24/2024    BUN 8 06/24/2024    CREATININE 0.77 06/24/2024    GLUC 91 06/24/2024    GLUF 95 06/03/2024    CALCIUM 8.7 06/24/2024    AST 8 (L) 06/22/2024    ALT 5 (L) 06/22/2024    ALKPHOS 63 06/22/2024    TP 6.2 (L) 06/22/2024    TBILI 0.35 06/22/2024    EGFR 76 06/24/2024           I spent 40 minutes on chart review, face to face counseling time, coordination of care and documentation.    Rosalinda Castro MD PhD

## 2024-06-28 ENCOUNTER — APPOINTMENT (OUTPATIENT)
Dept: RADIATION ONCOLOGY | Facility: HOSPITAL | Age: 74
End: 2024-06-28
Attending: INTERNAL MEDICINE
Payer: MEDICARE

## 2024-06-28 ENCOUNTER — APPOINTMENT (OUTPATIENT)
Dept: RADIATION ONCOLOGY | Facility: HOSPITAL | Age: 74
End: 2024-06-28
Payer: MEDICARE

## 2024-06-28 DIAGNOSIS — E86.0 DEHYDRATION: Primary | ICD-10-CM

## 2024-06-28 DIAGNOSIS — C34.11 MALIGNANT NEOPLASM OF UPPER LOBE, RIGHT BRONCHUS OR LUNG (HCC): ICD-10-CM

## 2024-06-28 PROCEDURE — 77014 CHG CT GUIDANCE RADIATION THERAPY FLDS PLACEMENT: CPT | Performed by: STUDENT IN AN ORGANIZED HEALTH CARE EDUCATION/TRAINING PROGRAM

## 2024-06-28 PROCEDURE — 77427 RADIATION TX MANAGEMENT X5: CPT | Performed by: INTERNAL MEDICINE

## 2024-06-28 PROCEDURE — 77386 HB NTSTY MODUL RAD TX DLVR CPLX: CPT | Performed by: STUDENT IN AN ORGANIZED HEALTH CARE EDUCATION/TRAINING PROGRAM

## 2024-07-01 ENCOUNTER — APPOINTMENT (OUTPATIENT)
Dept: RADIATION ONCOLOGY | Facility: HOSPITAL | Age: 74
End: 2024-07-01
Attending: INTERNAL MEDICINE
Payer: MEDICARE

## 2024-07-01 ENCOUNTER — OFFICE VISIT (OUTPATIENT)
Dept: HEMATOLOGY ONCOLOGY | Facility: CLINIC | Age: 74
End: 2024-07-01
Payer: MEDICARE

## 2024-07-01 ENCOUNTER — APPOINTMENT (OUTPATIENT)
Dept: LAB | Facility: CLINIC | Age: 74
End: 2024-07-01
Payer: MEDICARE

## 2024-07-01 VITALS
WEIGHT: 105.5 LBS | OXYGEN SATURATION: 99 % | HEART RATE: 77 BPM | HEIGHT: 62 IN | TEMPERATURE: 97.5 F | SYSTOLIC BLOOD PRESSURE: 140 MMHG | BODY MASS INDEX: 19.41 KG/M2 | DIASTOLIC BLOOD PRESSURE: 76 MMHG

## 2024-07-01 DIAGNOSIS — C34.11 MALIGNANT NEOPLASM OF UPPER LOBE, RIGHT BRONCHUS OR LUNG (HCC): ICD-10-CM

## 2024-07-01 DIAGNOSIS — C34.91 NSCLC OF RIGHT LUNG (HCC): Primary | ICD-10-CM

## 2024-07-01 LAB
ALBUMIN SERPL BCG-MCNC: 3.7 G/DL (ref 3.5–5)
ALP SERPL-CCNC: 62 U/L (ref 34–104)
ALT SERPL W P-5'-P-CCNC: 8 U/L (ref 7–52)
ANION GAP SERPL CALCULATED.3IONS-SCNC: 7 MMOL/L (ref 4–13)
AST SERPL W P-5'-P-CCNC: 10 U/L (ref 13–39)
BASOPHILS # BLD AUTO: 0.02 THOUSANDS/ÂΜL (ref 0–0.1)
BASOPHILS NFR BLD AUTO: 1 % (ref 0–1)
BILIRUB SERPL-MCNC: 0.32 MG/DL (ref 0.2–1)
BUN SERPL-MCNC: 14 MG/DL (ref 5–25)
CALCIUM SERPL-MCNC: 8.8 MG/DL (ref 8.4–10.2)
CHLORIDE SERPL-SCNC: 107 MMOL/L (ref 96–108)
CO2 SERPL-SCNC: 27 MMOL/L (ref 21–32)
CREAT SERPL-MCNC: 0.85 MG/DL (ref 0.6–1.3)
EOSINOPHIL # BLD AUTO: 0.07 THOUSAND/ÂΜL (ref 0–0.61)
EOSINOPHIL NFR BLD AUTO: 3 % (ref 0–6)
ERYTHROCYTE [DISTWIDTH] IN BLOOD BY AUTOMATED COUNT: 15.3 % (ref 11.6–15.1)
GFR SERPL CREATININE-BSD FRML MDRD: 68 ML/MIN/1.73SQ M
GLUCOSE P FAST SERPL-MCNC: 85 MG/DL (ref 65–99)
HCT VFR BLD AUTO: 29 % (ref 34.8–46.1)
HGB BLD-MCNC: 9 G/DL (ref 11.5–15.4)
IMM GRANULOCYTES # BLD AUTO: 0.01 THOUSAND/UL (ref 0–0.2)
IMM GRANULOCYTES NFR BLD AUTO: 0 % (ref 0–2)
LDH SERPL-CCNC: 161 U/L (ref 140–271)
LYMPHOCYTES # BLD AUTO: 0.29 THOUSANDS/ÂΜL (ref 0.6–4.47)
LYMPHOCYTES NFR BLD AUTO: 12 % (ref 14–44)
MAGNESIUM SERPL-MCNC: 2 MG/DL (ref 1.9–2.7)
MCH RBC QN AUTO: 31.4 PG (ref 26.8–34.3)
MCHC RBC AUTO-ENTMCNC: 31 G/DL (ref 31.4–37.4)
MCV RBC AUTO: 101 FL (ref 82–98)
MONOCYTES # BLD AUTO: 0.3 THOUSAND/ÂΜL (ref 0.17–1.22)
MONOCYTES NFR BLD AUTO: 12 % (ref 4–12)
NEUTROPHILS # BLD AUTO: 1.81 THOUSANDS/ÂΜL (ref 1.85–7.62)
NEUTS SEG NFR BLD AUTO: 72 % (ref 43–75)
NRBC BLD AUTO-RTO: 0 /100 WBCS
PLATELET # BLD AUTO: 240 THOUSANDS/UL (ref 149–390)
PMV BLD AUTO: 9.1 FL (ref 8.9–12.7)
POTASSIUM SERPL-SCNC: 4.1 MMOL/L (ref 3.5–5.3)
PROT SERPL-MCNC: 6.2 G/DL (ref 6.4–8.4)
RBC # BLD AUTO: 2.87 MILLION/UL (ref 3.81–5.12)
SODIUM SERPL-SCNC: 141 MMOL/L (ref 135–147)
WBC # BLD AUTO: 2.5 THOUSAND/UL (ref 4.31–10.16)

## 2024-07-01 PROCEDURE — 77386 HB NTSTY MODUL RAD TX DLVR CPLX: CPT | Performed by: RADIOLOGY

## 2024-07-01 PROCEDURE — 36415 COLL VENOUS BLD VENIPUNCTURE: CPT

## 2024-07-01 PROCEDURE — 80053 COMPREHEN METABOLIC PANEL: CPT

## 2024-07-01 PROCEDURE — 83615 LACTATE (LD) (LDH) ENZYME: CPT

## 2024-07-01 PROCEDURE — 77014 CHG CT GUIDANCE RADIATION THERAPY FLDS PLACEMENT: CPT | Performed by: RADIOLOGY

## 2024-07-01 PROCEDURE — 99215 OFFICE O/P EST HI 40 MIN: CPT | Performed by: INTERNAL MEDICINE

## 2024-07-01 PROCEDURE — 85025 COMPLETE CBC W/AUTO DIFF WBC: CPT

## 2024-07-01 PROCEDURE — 83735 ASSAY OF MAGNESIUM: CPT

## 2024-07-02 ENCOUNTER — APPOINTMENT (OUTPATIENT)
Dept: RADIATION ONCOLOGY | Facility: HOSPITAL | Age: 74
End: 2024-07-02
Payer: MEDICARE

## 2024-07-02 ENCOUNTER — HOSPITAL ENCOUNTER (OUTPATIENT)
Facility: HOSPITAL | Age: 74
Setting detail: OBSERVATION
Discharge: HOME | End: 2024-07-04
Attending: EMERGENCY MEDICINE | Admitting: INTERNAL MEDICINE
Payer: MEDICARE

## 2024-07-02 ENCOUNTER — APPOINTMENT (OUTPATIENT)
Dept: RADIATION ONCOLOGY | Facility: HOSPITAL | Age: 74
End: 2024-07-02
Attending: INTERNAL MEDICINE
Payer: MEDICARE

## 2024-07-02 ENCOUNTER — HOSPITAL ENCOUNTER (OUTPATIENT)
Dept: INFUSION CENTER | Facility: HOSPITAL | Age: 74
Discharge: HOME | End: 2024-07-02
Attending: INTERNAL MEDICINE
Payer: MEDICARE

## 2024-07-02 VITALS
BODY MASS INDEX: 19.19 KG/M2 | SYSTOLIC BLOOD PRESSURE: 154 MMHG | DIASTOLIC BLOOD PRESSURE: 84 MMHG | TEMPERATURE: 97.6 F | WEIGHT: 104.28 LBS | HEIGHT: 62 IN | RESPIRATION RATE: 18 BRPM | HEART RATE: 77 BPM

## 2024-07-02 DIAGNOSIS — E86.0 DEHYDRATION: ICD-10-CM

## 2024-07-02 DIAGNOSIS — C34.11 MALIGNANT NEOPLASM OF UPPER LOBE, RIGHT BRONCHUS OR LUNG (HCC): Primary | ICD-10-CM

## 2024-07-02 DIAGNOSIS — R50.9 FEVER: Primary | ICD-10-CM

## 2024-07-02 DIAGNOSIS — R65.10 SIRS (SYSTEMIC INFLAMMATORY RESPONSE SYNDROME) (HCC): ICD-10-CM

## 2024-07-02 DIAGNOSIS — C34.11 MALIGNANT NEOPLASM OF UPPER LOBE, RIGHT BRONCHUS OR LUNG (HCC): ICD-10-CM

## 2024-07-02 PROCEDURE — 85027 COMPLETE CBC AUTOMATED: CPT | Performed by: EMERGENCY MEDICINE

## 2024-07-02 PROCEDURE — 99284 EMERGENCY DEPT VISIT MOD MDM: CPT

## 2024-07-02 PROCEDURE — 96417 CHEMO IV INFUS EACH ADDL SEQ: CPT

## 2024-07-02 PROCEDURE — 96367 TX/PROPH/DG ADDL SEQ IV INF: CPT

## 2024-07-02 PROCEDURE — 80053 COMPREHEN METABOLIC PANEL: CPT | Performed by: EMERGENCY MEDICINE

## 2024-07-02 PROCEDURE — 36415 COLL VENOUS BLD VENIPUNCTURE: CPT

## 2024-07-02 PROCEDURE — 85007 BL SMEAR W/DIFF WBC COUNT: CPT | Performed by: EMERGENCY MEDICINE

## 2024-07-02 PROCEDURE — 84145 PROCALCITONIN (PCT): CPT

## 2024-07-02 PROCEDURE — 99285 EMERGENCY DEPT VISIT HI MDM: CPT | Performed by: EMERGENCY MEDICINE

## 2024-07-02 PROCEDURE — 77014 CHG CT GUIDANCE RADIATION THERAPY FLDS PLACEMENT: CPT | Performed by: RADIOLOGY

## 2024-07-02 PROCEDURE — 96413 CHEMO IV INFUSION 1 HR: CPT

## 2024-07-02 PROCEDURE — 96361 HYDRATE IV INFUSION ADD-ON: CPT

## 2024-07-02 PROCEDURE — 77386 HB NTSTY MODUL RAD TX DLVR CPLX: CPT | Performed by: RADIOLOGY

## 2024-07-02 RX ORDER — SODIUM CHLORIDE 9 MG/ML
20 INJECTION, SOLUTION INTRAVENOUS ONCE
Status: COMPLETED | OUTPATIENT
Start: 2024-07-02 | End: 2024-07-02

## 2024-07-02 RX ORDER — ACETAMINOPHEN 325 MG/1
650 TABLET ORAL ONCE
Status: COMPLETED | OUTPATIENT
Start: 2024-07-02 | End: 2024-07-02

## 2024-07-02 RX ADMIN — PACLITAXEL 67.2 MG: 6 INJECTION, SOLUTION INTRAVENOUS at 12:39

## 2024-07-02 RX ADMIN — FAMOTIDINE 20 MG: 10 INJECTION, SOLUTION INTRAVENOUS at 11:57

## 2024-07-02 RX ADMIN — SODIUM CHLORIDE 20 ML/HR: 0.9 INJECTION, SOLUTION INTRAVENOUS at 11:09

## 2024-07-02 RX ADMIN — CARBOPLATIN 143.6 MG: 10 INJECTION, SOLUTION INTRAVENOUS at 13:46

## 2024-07-02 RX ADMIN — DIPHENHYDRAMINE HYDROCHLORIDE 25 MG: 50 INJECTION, SOLUTION INTRAMUSCULAR; INTRAVENOUS at 11:13

## 2024-07-02 RX ADMIN — DEXAMETHASONE SODIUM PHOSPHATE: 10 INJECTION, SOLUTION INTRAMUSCULAR; INTRAVENOUS at 11:35

## 2024-07-02 RX ADMIN — SODIUM CHLORIDE 1000 ML: 0.9 INJECTION, SOLUTION INTRAVENOUS at 14:20

## 2024-07-02 RX ADMIN — ACETAMINOPHEN 650 MG: 325 TABLET, FILM COATED ORAL at 23:36

## 2024-07-02 NOTE — PROGRESS NOTES
Pt tolerated treatment today with no adverse reactions. AVS declined, treatment complete. Left unit ambulatory with a steady gait.

## 2024-07-03 ENCOUNTER — APPOINTMENT (EMERGENCY)
Dept: RADIOLOGY | Facility: HOSPITAL | Age: 74
End: 2024-07-03
Payer: MEDICARE

## 2024-07-03 ENCOUNTER — APPOINTMENT (OUTPATIENT)
Dept: RADIATION ONCOLOGY | Facility: HOSPITAL | Age: 74
End: 2024-07-03
Attending: INTERNAL MEDICINE
Payer: MEDICARE

## 2024-07-03 ENCOUNTER — APPOINTMENT (EMERGENCY)
Dept: CT IMAGING | Facility: HOSPITAL | Age: 74
End: 2024-07-03
Payer: MEDICARE

## 2024-07-03 PROBLEM — N18.30 CKD (CHRONIC KIDNEY DISEASE) STAGE 3, GFR 30-59 ML/MIN (HCC): Status: ACTIVE | Noted: 2024-02-22

## 2024-07-03 LAB
ALBUMIN SERPL BCG-MCNC: 3.3 G/DL (ref 3.5–5)
ALBUMIN SERPL BCG-MCNC: 3.8 G/DL (ref 3.5–5)
ALP SERPL-CCNC: 55 U/L (ref 34–104)
ALP SERPL-CCNC: 67 U/L (ref 34–104)
ALT SERPL W P-5'-P-CCNC: 6 U/L (ref 7–52)
ALT SERPL W P-5'-P-CCNC: 7 U/L (ref 7–52)
ANION GAP SERPL CALCULATED.3IONS-SCNC: 12 MMOL/L (ref 4–13)
ANION GAP SERPL CALCULATED.3IONS-SCNC: 7 MMOL/L (ref 4–13)
ANISOCYTOSIS BLD QL SMEAR: PRESENT
APTT PPP: 27 SECONDS (ref 23–37)
AST SERPL W P-5'-P-CCNC: 11 U/L (ref 13–39)
AST SERPL W P-5'-P-CCNC: 11 U/L (ref 13–39)
BACTERIA UR QL AUTO: NORMAL /HPF
BASOPHILS # BLD MANUAL: 0 THOUSAND/UL (ref 0–0.1)
BASOPHILS # BLD MANUAL: 0 THOUSAND/UL (ref 0–0.1)
BASOPHILS NFR MAR MANUAL: 0 % (ref 0–1)
BASOPHILS NFR MAR MANUAL: 0 % (ref 0–1)
BILIRUB SERPL-MCNC: 0.32 MG/DL (ref 0.2–1)
BILIRUB SERPL-MCNC: 0.45 MG/DL (ref 0.2–1)
BILIRUB UR QL STRIP: NEGATIVE
BUN SERPL-MCNC: 11 MG/DL (ref 5–25)
BUN SERPL-MCNC: 13 MG/DL (ref 5–25)
CALCIUM ALBUM COR SERPL-MCNC: 8.8 MG/DL (ref 8.3–10.1)
CALCIUM SERPL-MCNC: 8.2 MG/DL (ref 8.4–10.2)
CALCIUM SERPL-MCNC: 8.5 MG/DL (ref 8.4–10.2)
CHLORIDE SERPL-SCNC: 104 MMOL/L (ref 96–108)
CHLORIDE SERPL-SCNC: 108 MMOL/L (ref 96–108)
CLARITY UR: CLEAR
CO2 SERPL-SCNC: 17 MMOL/L (ref 21–32)
CO2 SERPL-SCNC: 24 MMOL/L (ref 21–32)
COLOR UR: COLORLESS
CREAT SERPL-MCNC: 0.89 MG/DL (ref 0.6–1.3)
CREAT SERPL-MCNC: 0.93 MG/DL (ref 0.6–1.3)
EOSINOPHIL # BLD MANUAL: 0 THOUSAND/UL (ref 0–0.4)
EOSINOPHIL # BLD MANUAL: 0.02 THOUSAND/UL (ref 0–0.4)
EOSINOPHIL NFR BLD MANUAL: 0 % (ref 0–6)
EOSINOPHIL NFR BLD MANUAL: 1 % (ref 0–6)
ERYTHROCYTE [DISTWIDTH] IN BLOOD BY AUTOMATED COUNT: 15.6 % (ref 11.6–15.1)
ERYTHROCYTE [DISTWIDTH] IN BLOOD BY AUTOMATED COUNT: 15.7 % (ref 11.6–15.1)
FLUAV RNA RESP QL NAA+PROBE: NEGATIVE
FLUBV RNA RESP QL NAA+PROBE: NEGATIVE
FOLATE SERPL-MCNC: 7 NG/ML
GFR SERPL CREATININE-BSD FRML MDRD: 61 ML/MIN/1.73SQ M
GFR SERPL CREATININE-BSD FRML MDRD: 64 ML/MIN/1.73SQ M
GLUCOSE SERPL-MCNC: 181 MG/DL (ref 65–140)
GLUCOSE SERPL-MCNC: 83 MG/DL (ref 65–140)
GLUCOSE UR STRIP-MCNC: NEGATIVE MG/DL
HCT VFR BLD AUTO: 26.2 % (ref 34.8–46.1)
HCT VFR BLD AUTO: 28.7 % (ref 34.8–46.1)
HGB BLD-MCNC: 8.3 G/DL (ref 11.5–15.4)
HGB BLD-MCNC: 9 G/DL (ref 11.5–15.4)
HGB UR QL STRIP.AUTO: ABNORMAL
INR PPP: 1 (ref 0.84–1.19)
KETONES UR STRIP-MCNC: NEGATIVE MG/DL
LACTATE SERPL-SCNC: 1.7 MMOL/L (ref 0.5–2)
LEUKOCYTE ESTERASE UR QL STRIP: NEGATIVE
LYMPHOCYTES # BLD AUTO: 0.02 THOUSAND/UL (ref 0.6–4.47)
LYMPHOCYTES # BLD AUTO: 0.08 THOUSAND/UL (ref 0.6–4.47)
LYMPHOCYTES # BLD AUTO: 1 % (ref 14–44)
LYMPHOCYTES # BLD AUTO: 4 % (ref 14–44)
MACROCYTES BLD QL AUTO: PRESENT
MCH RBC QN AUTO: 31.4 PG (ref 26.8–34.3)
MCH RBC QN AUTO: 31.8 PG (ref 26.8–34.3)
MCHC RBC AUTO-ENTMCNC: 31.4 G/DL (ref 31.4–37.4)
MCHC RBC AUTO-ENTMCNC: 31.7 G/DL (ref 31.4–37.4)
MCV RBC AUTO: 100 FL (ref 82–98)
MCV RBC AUTO: 100 FL (ref 82–98)
MONOCYTES # BLD AUTO: 0.04 THOUSAND/UL (ref 0–1.22)
MONOCYTES # BLD AUTO: 0.12 THOUSAND/UL (ref 0–1.22)
MONOCYTES NFR BLD: 2 % (ref 4–12)
MONOCYTES NFR BLD: 6 % (ref 4–12)
NEUTROPHILS # BLD MANUAL: 1.83 THOUSAND/UL (ref 1.85–7.62)
NEUTROPHILS # BLD MANUAL: 2.08 THOUSAND/UL (ref 1.85–7.62)
NEUTS BAND NFR BLD MANUAL: 7 % (ref 0–8)
NEUTS SEG NFR BLD AUTO: 89 % (ref 43–75)
NEUTS SEG NFR BLD AUTO: 90 % (ref 43–75)
NITRITE UR QL STRIP: NEGATIVE
NON-SQ EPI CELLS URNS QL MICRO: NORMAL /HPF
PH UR STRIP.AUTO: 6 [PH]
PLATELET # BLD AUTO: 181 THOUSANDS/UL (ref 149–390)
PLATELET # BLD AUTO: 203 THOUSANDS/UL (ref 149–390)
PLATELET BLD QL SMEAR: ADEQUATE
PLATELET BLD QL SMEAR: ADEQUATE
PMV BLD AUTO: 9.1 FL (ref 8.9–12.7)
PMV BLD AUTO: 9.1 FL (ref 8.9–12.7)
POTASSIUM SERPL-SCNC: 4 MMOL/L (ref 3.5–5.3)
POTASSIUM SERPL-SCNC: 4.1 MMOL/L (ref 3.5–5.3)
PROCALCITONIN SERPL-MCNC: 0.09 NG/ML
PROT SERPL-MCNC: 5.6 G/DL (ref 6.4–8.4)
PROT SERPL-MCNC: 6.1 G/DL (ref 6.4–8.4)
PROT UR STRIP-MCNC: NEGATIVE MG/DL
PROTHROMBIN TIME: 13.8 SECONDS (ref 11.6–14.5)
RBC # BLD AUTO: 2.61 MILLION/UL (ref 3.81–5.12)
RBC # BLD AUTO: 2.87 MILLION/UL (ref 3.81–5.12)
RBC #/AREA URNS AUTO: NORMAL /HPF
RBC MORPH BLD: PRESENT
RBC MORPH BLD: PRESENT
RSV RNA RESP QL NAA+PROBE: NEGATIVE
SARS-COV-2 RNA RESP QL NAA+PROBE: NEGATIVE
SODIUM SERPL-SCNC: 135 MMOL/L (ref 135–147)
SODIUM SERPL-SCNC: 137 MMOL/L (ref 135–147)
SP GR UR STRIP.AUTO: 1.02 (ref 1–1.03)
UROBILINOGEN UR STRIP-ACNC: <2 MG/DL
VIT B12 SERPL-MCNC: 584 PG/ML (ref 180–914)
WBC # BLD AUTO: 2.06 THOUSAND/UL (ref 4.31–10.16)
WBC # BLD AUTO: 2.14 THOUSAND/UL (ref 4.31–10.16)
WBC #/AREA URNS AUTO: NORMAL /HPF

## 2024-07-03 PROCEDURE — 82607 VITAMIN B-12: CPT

## 2024-07-03 PROCEDURE — 81001 URINALYSIS AUTO W/SCOPE: CPT

## 2024-07-03 PROCEDURE — 85730 THROMBOPLASTIN TIME PARTIAL: CPT

## 2024-07-03 PROCEDURE — 71260 CT THORAX DX C+: CPT

## 2024-07-03 PROCEDURE — 85027 COMPLETE CBC AUTOMATED: CPT

## 2024-07-03 PROCEDURE — 83605 ASSAY OF LACTIC ACID: CPT

## 2024-07-03 PROCEDURE — 87040 BLOOD CULTURE FOR BACTERIA: CPT

## 2024-07-03 PROCEDURE — 96365 THER/PROPH/DIAG IV INF INIT: CPT

## 2024-07-03 PROCEDURE — 85610 PROTHROMBIN TIME: CPT

## 2024-07-03 PROCEDURE — 71046 X-RAY EXAM CHEST 2 VIEWS: CPT

## 2024-07-03 PROCEDURE — 96361 HYDRATE IV INFUSION ADD-ON: CPT

## 2024-07-03 PROCEDURE — 74177 CT ABD & PELVIS W/CONTRAST: CPT

## 2024-07-03 PROCEDURE — 99223 1ST HOSP IP/OBS HIGH 75: CPT | Performed by: INTERNAL MEDICINE

## 2024-07-03 PROCEDURE — 85007 BL SMEAR W/DIFF WBC COUNT: CPT

## 2024-07-03 PROCEDURE — 93005 ELECTROCARDIOGRAM TRACING: CPT

## 2024-07-03 PROCEDURE — 77386 HB NTSTY MODUL RAD TX DLVR CPLX: CPT | Performed by: STUDENT IN AN ORGANIZED HEALTH CARE EDUCATION/TRAINING PROGRAM

## 2024-07-03 PROCEDURE — 77014 CHG CT GUIDANCE RADIATION THERAPY FLDS PLACEMENT: CPT | Performed by: STUDENT IN AN ORGANIZED HEALTH CARE EDUCATION/TRAINING PROGRAM

## 2024-07-03 PROCEDURE — 36415 COLL VENOUS BLD VENIPUNCTURE: CPT

## 2024-07-03 PROCEDURE — 82746 ASSAY OF FOLIC ACID SERUM: CPT

## 2024-07-03 PROCEDURE — 0241U HB NFCT DS VIR RESP RNA 4 TRGT: CPT

## 2024-07-03 PROCEDURE — 80053 COMPREHEN METABOLIC PANEL: CPT

## 2024-07-03 RX ORDER — ACETAMINOPHEN 325 MG/1
650 TABLET ORAL EVERY 6 HOURS PRN
Status: DISCONTINUED | OUTPATIENT
Start: 2024-07-03 | End: 2024-07-04 | Stop reason: HOSPADM

## 2024-07-03 RX ORDER — ENOXAPARIN SODIUM 100 MG/ML
40 INJECTION SUBCUTANEOUS DAILY
Status: DISCONTINUED | OUTPATIENT
Start: 2024-07-03 | End: 2024-07-04 | Stop reason: HOSPADM

## 2024-07-03 RX ORDER — ASPIRIN 81 MG/1
81 TABLET, CHEWABLE ORAL DAILY
Status: DISCONTINUED | OUTPATIENT
Start: 2024-07-03 | End: 2024-07-04 | Stop reason: HOSPADM

## 2024-07-03 RX ADMIN — CYANOCOBALAMIN TAB 500 MCG 1000 MCG: 500 TAB at 08:58

## 2024-07-03 RX ADMIN — ACETAMINOPHEN 650 MG: 325 TABLET, FILM COATED ORAL at 16:46

## 2024-07-03 RX ADMIN — ENOXAPARIN SODIUM 40 MG: 40 INJECTION SUBCUTANEOUS at 09:00

## 2024-07-03 RX ADMIN — ASPIRIN 81 MG 81 MG: 81 TABLET ORAL at 08:58

## 2024-07-03 RX ADMIN — IOHEXOL 100 ML: 350 INJECTION, SOLUTION INTRAVENOUS at 01:52

## 2024-07-03 RX ADMIN — Medication 5000 UNITS: at 08:58

## 2024-07-03 RX ADMIN — PIPERACILLIN SODIUM AND TAZOBACTAM SODIUM 4.5 G: 36; 4.5 INJECTION, POWDER, LYOPHILIZED, FOR SOLUTION INTRAVENOUS at 02:33

## 2024-07-03 RX ADMIN — SODIUM CHLORIDE 500 ML: 0.9 INJECTION, SOLUTION INTRAVENOUS at 02:00

## 2024-07-03 NOTE — ED PROVIDER NOTES
History  Chief Complaint   Patient presents with    Fever     Pt has lung cancer. Today pt had chemo and afterwards at home pt started getting the chills and had a temp of 103.      HPI    74 yo female presents for evaluation of fever and chills. She has a hx of lung ca, and is currently on chemotherapy treatment.  She received an infusion earlier today, and after getting home, she spiked a fever of 104 °F.  Denies headache, abdominal pain, nausea, vomiting, urinary symptoms.  She had a similar episode last week after her last infusion, and required antibiotics and admission.    Prior to Admission Medications   Prescriptions Last Dose Informant Patient Reported? Taking?   Cholecalciferol (VITAMIN D3) 5000 units TABS 2024 Self Yes Yes   Si,000 Units Daily    Probiotic Product (PROBIOTIC DAILY PO) 2024 Self Yes Yes   Sig: Take 1 capsule by mouth daily   aspirin 81 MG tablet 2024 Self Yes Yes   Sig: Take 81 mg by mouth daily    diphenhydrAMINE (BENADRYL) 25 mg capsule 2024 Self Yes Yes   Sig: Take 25 mg by mouth every 12 (twelve) hours as needed for itching    ondansetron (ZOFRAN) 8 mg tablet Not Taking Self No No   Sig: Take 1 tablet (8 mg total) by mouth every 8 (eight) hours as needed for nausea or vomiting   Patient not taking: Reported on 7/3/2024   prochlorperazine (COMPAZINE) 10 mg tablet Not Taking Self No No   Sig: Take 1 tablet (10 mg total) by mouth every 6 (six) hours as needed for nausea or vomiting   Patient not taking: Reported on 2024   sucralfate (CARAFATE) 1 g tablet Not Taking Self No No   Sig: Take 1 tablet (1 g total) by mouth 3 (three) times a day Crush 1g tab in 5 mL water, 3x daily as needed   Patient not taking: Reported on 2024   vitamin B-12 (VITAMIN B-12) 1,000 mcg tablet 2024 Self No Yes   Sig: Take 1 tablet (1,000 mcg total) by mouth daily      Facility-Administered Medications: None       Past Medical History:   Diagnosis Date    Allergic     Allergic  to neomiacin and cephalexin    Cataract 2023    Due to have durgery 2024    Essential thrombocytosis (HCC)     controlled with medication    Hyperlipidemia     Hypertension     Mass in chest     Nodular goiter     Pneumonia Oct 16, 2023    Also  2024    Psoriasis     SIRS (systemic inflammatory response syndrome) (HCC) 2024       Past Surgical History:   Procedure Laterality Date    APPENDECTOMY      BREAST CYST EXCISION Right     COLONOSCOPY  10/31/2018    DXA PROCEDURE (HISTORICAL)  2017    MAMMO (HISTORICAL)      8-24-18    MAMMO NEEDLE LOCALIZATION RIGHT (ALL INC) Right 2009    SKIN LESION EXCISION      bridge of nose       Family History   Problem Relation Age of Onset    Stroke Mother     Depression Mother             Hypertension Mother     Hearing loss Mother     Tuberculosis Father     Cancer Brother         lung    Tuberculosis Brother     Coronary artery disease Brother     Hypertension Brother     No Known Problems Daughter     No Known Problems Daughter     No Known Problems Maternal Grandmother     No Known Problems Maternal Grandfather     No Known Problems Paternal Grandmother     No Known Problems Paternal Grandfather     No Known Problems Maternal Aunt     No Known Problems Maternal Aunt     No Known Problems Maternal Aunt     No Known Problems Maternal Aunt     No Known Problems Paternal Aunt     Cancer Brother         Throat cancer     I have reviewed and agree with the history as documented.    E-Cigarette/Vaping    E-Cigarette Use Never User      E-Cigarette/Vaping Substances    Nicotine Yes     THC No     CBD No     Flavoring No     Other No     Unknown No      Social History     Tobacco Use    Smoking status: Former     Current packs/day: 1.00     Average packs/day: 1 pack/day for 58.5 years (58.5 ttl pk-yrs)     Types: Cigarettes     Start date:     Smokeless tobacco: Never    Tobacco comments:     Quit    Vaping Use    Vaping status: Never  Used   Substance Use Topics    Alcohol use: Not Currently    Drug use: Never        Review of Systems   Constitutional:  Positive for chills and fever.   Respiratory:  Positive for cough. Negative for shortness of breath.    Cardiovascular:  Negative for chest pain and palpitations.   Gastrointestinal:  Negative for abdominal pain, nausea and vomiting.   Genitourinary:  Negative for dysuria and hematuria.   Neurological:  Negative for seizures, syncope and headaches.   All other systems reviewed and are negative.      Physical Exam  ED Triage Vitals   Temperature Pulse Respirations Blood Pressure SpO2   07/02/24 2329 07/02/24 2329 07/02/24 2329 07/02/24 2329 07/02/24 2329   (!) 102.9 °F (39.4 °C) (!) 118 20 146/71 99 %      Temp Source Heart Rate Source Patient Position - Orthostatic VS BP Location FiO2 (%)   07/02/24 2329 07/02/24 2329 07/02/24 2329 07/02/24 2329 --   Oral Monitor Sitting Right arm       Pain Score       07/02/24 2336       Med Not Given for Pain - for MAR use only             Orthostatic Vital Signs  Vitals:    07/03/24 0012 07/03/24 0300 07/03/24 0619 07/03/24 0735   BP: 127/64 130/60 134/67 145/68   Pulse: 97 86 80 89   Patient Position - Orthostatic VS: Lying Lying Lying        Physical Exam  Vitals and nursing note reviewed.   Constitutional:       General: She is not in acute distress.  HENT:      Head: Normocephalic and atraumatic.      Mouth/Throat:      Mouth: Mucous membranes are moist.   Eyes:      Conjunctiva/sclera: Conjunctivae normal.   Cardiovascular:      Rate and Rhythm: Regular rhythm. Tachycardia present.      Pulses: Normal pulses.      Heart sounds: Normal heart sounds. No murmur heard.  Pulmonary:      Effort: Pulmonary effort is normal. No respiratory distress.      Breath sounds: Normal breath sounds.   Abdominal:      General: Abdomen is flat. There is no distension.      Palpations: Abdomen is soft.      Tenderness: There is no abdominal tenderness.   Musculoskeletal:       Cervical back: Neck supple.   Skin:     General: Skin is warm and dry.      Capillary Refill: Capillary refill takes less than 2 seconds.   Neurological:      Mental Status: She is alert and oriented to person, place, and time.         ED Medications  Medications   aspirin chewable tablet 81 mg (has no administration in time range)   Cholecalciferol (VITAMIN D3) tablet 5,000 Units (has no administration in time range)   cyanocobalamin (VITAMIN B-12) tablet 1,000 mcg (has no administration in time range)   enoxaparin (LOVENOX) subcutaneous injection 40 mg (has no administration in time range)   acetaminophen (TYLENOL) tablet 650 mg (650 mg Oral Given 7/2/24 2336)   sodium chloride 0.9 % bolus 500 mL (0 mL Intravenous Stopped 7/3/24 0321)   iohexol (OMNIPAQUE) 350 MG/ML injection (MULTI-DOSE) 100 mL (100 mL Intravenous Given 7/3/24 0152)   piperacillin-tazobactam (ZOSYN) IVPB 4.5 g (0 g Intravenous Stopped 7/3/24 0321)       Diagnostic Studies  Results Reviewed       Procedure Component Value Units Date/Time    Comprehensive metabolic panel [[id number]]  (Abnormal) Collected: 07/03/24 0615    Lab Status: Final result Specimen: Blood from Arm, Right Updated: 07/03/24 0721     Sodium 135 mmol/L      Potassium 4.1 mmol/L      Chloride 104 mmol/L      CO2 24 mmol/L      ANION GAP 7 mmol/L      BUN 11 mg/dL      Creatinine 0.93 mg/dL      Glucose 83 mg/dL      Calcium 8.2 mg/dL      Corrected Calcium 8.8 mg/dL      AST 11 U/L      ALT 7 U/L      Alkaline Phosphatase 55 U/L      Total Protein 5.6 g/dL      Albumin 3.3 g/dL      Total Bilirubin 0.45 mg/dL      eGFR 61 ml/min/1.73sq m     Narrative:      National Kidney Disease Foundation guidelines for Chronic Kidney Disease (CKD):     Stage 1 with normal or high GFR (GFR > 90 mL/min/1.73 square meters)    Stage 2 Mild CKD (GFR = 60-89 mL/min/1.73 square meters)    Stage 3A Moderate CKD (GFR = 45-59 mL/min/1.73 square meters)    Stage 3B Moderate CKD (GFR = 30-44 mL/min/1.73  square meters)    Stage 4 Severe CKD (GFR = 15-29 mL/min/1.73 square meters)    Stage 5 End Stage CKD (GFR <15 mL/min/1.73 square meters)  Note: GFR calculation is accurate only with a steady state creatinine    Vitamin B12 [[id number]] Collected: 07/03/24 0615    Lab Status: In process Specimen: Blood from Arm, Right Updated: 07/03/24 0650    Folate [[id number]] Collected: 07/03/24 0615    Lab Status: In process Specimen: Blood from Arm, Right Updated: 07/03/24 0650    CBC and differential [[id number]]  (Abnormal) Collected: 07/03/24 0615    Lab Status: Preliminary result Specimen: Blood from Arm, Right Updated: 07/03/24 0636     WBC 2.06 Thousand/uL      RBC 2.61 Million/uL      Hemoglobin 8.3 g/dL      Hematocrit 26.2 %       fL      MCH 31.8 pg      MCHC 31.7 g/dL      RDW 15.7 %      MPV 9.1 fL      Platelets 181 Thousands/uL     Urine Microscopic [[id number]]  (Normal) Collected: 07/03/24 0159    Lab Status: Final result Specimen: Urine, Clean Catch Updated: 07/03/24 0218     RBC, UA None Seen /hpf      WBC, UA 1-2 /hpf      Epithelial Cells Occasional /hpf      Bacteria, UA None Seen /hpf     UA w Reflex to Microscopic w Reflex to Culture [[id number]]  (Abnormal) Collected: 07/03/24 0159    Lab Status: Final result Specimen: Urine, Clean Catch Updated: 07/03/24 0216     Color, UA Colorless     Clarity, UA Clear     Specific Gravity, UA 1.019     pH, UA 6.0     Leukocytes, UA Negative     Nitrite, UA Negative     Protein, UA Negative mg/dl      Glucose, UA Negative mg/dl      Ketones, UA Negative mg/dl      Urobilinogen, UA <2.0 mg/dl      Bilirubin, UA Negative     Occult Blood, UA Trace    FLU/RSV/COVID - if FLU/RSV clinically relevant [[id number]]  (Normal) Collected: 07/03/24 0109    Lab Status: Final result Specimen: Nares from Nose Updated: 07/03/24 0159     SARS-CoV-2 Negative     INFLUENZA A PCR Negative     INFLUENZA B PCR Negative     RSV PCR Negative    Narrative:      FOR PEDIATRIC PATIENTS -  copy/paste COVID Guidelines URL to browser: [email or web address]    SARS-CoV-2 assay is a Nucleic Acid Amplification assay intended for the  qualitative detection of nucleic acid from SARS-CoV-2 in nasopharyngeal  swabs. Results are for the presumptive identification of SARS-CoV-2 RNA.    Positive results are indicative of infection with SARS-CoV-2, the virus  causing COVID-19, but do not rule out bacterial infection or co-infection  with other viruses. Laboratories within the [place] and its  territories are required to report all positive results to the appropriate  public health authorities. Negative results do not preclude SARS-CoV-2  infection and should not be used as the sole basis for treatment or other  patient management decisions. Negative results must be combined with  clinical observations, patient history, and epidemiological information.  This test has not been FDA cleared or approved.    This test has been authorized by FDA under an Emergency Use Authorization  (EUA). This test is only authorized for the duration of time the  declaration that circumstances exist justifying the authorization of the  emergency use of an in vitro diagnostic tests for detection of SARS-CoV-2  virus and/or diagnosis of COVID-19 infection under section 564(b)(1) of  the Act, 21 U.S.C. 360bbb-3(b)(1), unless the authorization is terminated  or revoked sooner. The test has been validated but independent review by FDA  and CLIA is pending.    Test performed using [facility] GeneXpert: This RT-PCR assay targets N2,  a region unique to SARS-CoV-2. A conserved region in the E-gene was chosen  for pan-Sarbecovirus detection which includes SARS-CoV-2.    According to CMS-2020-01-R, this platform meets the definition of high-throughput technology.    Lactic acid [[id number]]  (Normal) Collected: 07/03/24 0115    Lab Status: Final result Specimen: Blood from Arm, Right Updated:  07/03/24 0158     LACTIC ACID 1.7 mmol/L     Narrative:      Result may be elevated if tourniquet was used during collection.    Protime-INR [[id number]]  (Normal) Collected: 07/03/24 0115    Lab Status: Final result Specimen: Blood from Arm, Right Updated: 07/03/24 0154     Protime 13.8 seconds      INR 1.00    APTT [[id number]]  (Normal) Collected: 07/03/24 0115    Lab Status: Final result Specimen: Blood from Arm, Right Updated: 07/03/24 0154     PTT 27 seconds     Procalcitonin [[id number]]  (Normal) Collected: 07/02/24 2335    Lab Status: Final result Specimen: Blood from Arm, Right Updated: 07/03/24 0139     Procalcitonin 0.09 ng/ml     Blood culture #1 [[id number]] Collected: 07/03/24 0115    Lab Status: In process Specimen: Blood from Arm, Right Updated: 07/03/24 0130    Blood culture #2 [[id number]] Collected: 07/03/24 0115    Lab Status: In process Specimen: Blood from Arm, Right Updated: 07/03/24 0130    RBC Morphology Reflex Test [[id number]] Collected: 07/02/24 2335    Lab Status: Final result Specimen: Blood from Arm, Right Updated: 07/03/24 0101    CBC and differential [[id number]]  (Abnormal) Collected: 07/02/24 2335    Lab Status: Final result Specimen: Blood from Arm, Right Updated: 07/03/24 0045     WBC 2.14 Thousand/uL      RBC 2.87 Million/uL      Hemoglobin 9.0 g/dL      Hematocrit 28.7 %       fL      MCH 31.4 pg      MCHC 31.4 g/dL      RDW 15.6 %      MPV 9.1 fL      Platelets 203 Thousands/uL     Narrative:      This is an appended report.  These results have been appended to a previously verified report.    Manual Differential(PHLEBS Do Not Order) [[id number]]  (Abnormal) Collected: 07/02/24 2335    Lab Status: Final result Specimen: Blood from Arm, Right Updated: 07/03/24 0045     Segmented % 90 %      Bands % 7 %      Lymphocytes % 1 %      Monocytes % 2 %      Eosinophils % 0 %      Basophils % 0 %      Absolute Neutrophils 2.08 Thousand/uL      Absolute Lymphocytes 0.02 Thousand/uL       Absolute Monocytes 0.04 Thousand/uL      Absolute Eosinophils 0.00 Thousand/uL      Absolute Basophils 0.00 Thousand/uL      Total Counted --     RBC Morphology Present     Platelet Estimate Adequate     Anisocytosis Present    Comprehensive metabolic panel [[id number]]  (Abnormal) Collected: 07/02/24 0736    Lab Status: Final result Specimen: Blood from Arm, Right Updated: 07/03/24 0043     Sodium 137 mmol/L      Potassium 4.0 mmol/L      Chloride 108 mmol/L      CO2 17 mmol/L      ANION GAP 12 mmol/L      BUN 13 mg/dL      Creatinine 0.89 mg/dL      Glucose 181 mg/dL      Calcium 8.5 mg/dL      AST 11 U/L      ALT 6 U/L      Alkaline Phosphatase 67 U/L      Total Protein 6.1 g/dL      Albumin 3.8 g/dL      Total Bilirubin 0.32 mg/dL      eGFR 64 ml/min/1.73sq m     Narrative:      National Kidney Disease Foundation guidelines for Chronic Kidney Disease (CKD):     Stage 1 with normal or high GFR (GFR > 90 mL/min/1.73 square meters)    Stage 2 Mild CKD (GFR = 60-89 mL/min/1.73 square meters)    Stage 3A Moderate CKD (GFR = 45-59 mL/min/1.73 square meters)    Stage 3B Moderate CKD (GFR = 30-44 mL/min/1.73 square meters)    Stage 4 Severe CKD (GFR = 15-29 mL/min/1.73 square meters)    Stage 5 End Stage CKD (GFR <15 mL/min/1.73 square meters)  Note: GFR calculation is accurate only with a steady state creatinine                   CT chest abdomen pelvis w contrast   Final Result by Vicky Umanzor MD (07/03 0346)      There has been interval progression of neoplastic disease, as described above. Please see discussion.      Mild hepatomegaly. Mild hepatic steatosis.      Mild colonic diverticulosis without evidence of acute diverticulitis. No bowel obstruction.      Atherosclerosis. Coronary artery disease.      Other nonemergent findings as above.               Workstation performed: [id number]         XR chest 2 views   ED Interpretation by Evelio Gan MD (07/03 0111)   WET read: no acute cardiopulmonary  diease. Lung mass in R upper lobe appears stable from last Xray.             Procedures  ECG 12 Lead Documentation Only    Date/Time: 7/3/2024 3:30 AM    Performed by: Evelio Gan MD  Authorized by: Evelio Gan MD    Rate:     ECG rate:  98    ECG rate assessment: normal    Rhythm:     Rhythm: sinus rhythm    Comments:      NSR, 98 bpm.  AK interval 138 ms, QRS 66 ms, QTc 465 ms.  Normal axis.  No ST elevations or ischemic changes noted.  Compared to EKG of 6/22/2024, no significant changes noted.        ED Course                                       Medical Decision Making  73-year-old female presents for evaluation of fever    On initial evaluation, patient resting comfortably in bed, in no acute distress.  Temp was 102.9 on arrival, received Tylenol in triage.  Sepsis labs obtained, chest x-ray and CT chest abdomen pelvis also ordered.  Labs significant for WBC 2.14 (baseline is 2), but she is not neutropenic.  Labs otherwise unremarkable and UA negative.  Chest x-ray showed no acute cardiopulmonary disease, CT showed interval progression of neoplastic disease, including new lung mass noted, and areas of necrosis.  IV fluids and Zosyn given.  Patient admitted for further antibiotic treatment and further evaluation.    Amount and/or Complexity of Data Reviewed  Labs: ordered.  Radiology: ordered and independent interpretation performed.    Risk  OTC drugs.  Prescription drug management.  Decision regarding hospitalization.          Disposition  Final diagnoses:   Fever   SIRS (systemic inflammatory response syndrome) (HCC)     Time reflects when diagnosis was documented in both MDM as applicable and the Disposition within this note       Time User Action Codes Description Comment    7/3/2024  5:04 AM Evelio Gan Add [R50.9] Fever     7/3/2024  5:04 AM Evelio Gan Add [R65.10] SIRS (systemic inflammatory response syndrome) (HCC)           ED Disposition       ED Disposition    Admit    Condition   Stable    Date/Time   Wed Jul 3, 2024  5:04 AM    Comment   Case was discussed with SKINNY and the patient's admission status was agreed to be Admission Status: observation status to the service of Dr. Mccoy .               Follow-up Information    None         Patient's Medications   Discharge Prescriptions    No medications on file     No discharge procedures on file.    PDMP Review         Value Time User    PDMP Reviewed  Yes 7/2/2024 11:53 PM Carlos Burroughs MD             ED Provider  Attending physically available and evaluated Ayla Nye. I managed the patient along with the ED Attending.    Electronically Signed by           Evelio Gan MD  07/03/24 0794

## 2024-07-03 NOTE — ASSESSMENT & PLAN NOTE
Patient with history of right primary lung adenocarcinoma (non-small cell carcinoma) diagnosed in 2024  Currently being treated with radiation and chemo(carboplatin and paclitaxel)  Completed last chemotherapy yesterday and has radiation scheduled for tomorrow.  Per patient, last chemotherapy yesterday,but still require 2 more radiation treatment followed by imaging in 8 weeks to evaluate disease progression.  In the ED, CT C/A/P: interval progression of neoplastic disease     Plan:  May require radiation oncology consult if not discharged for radiation tomorrow

## 2024-07-03 NOTE — PROGRESS NOTES
Critical access hospital  Progress Note  Name: Ayla Nye I  MRN: [id number]  Unit/Bed#: ED-34 I Date of Admission: 7/2/2024   Date of Service: 7/3/2024 I Hospital Day: 0    Assessment & Plan   * SIRS (systemic inflammatory response syndrome) (HCC)  Assessment & Plan  Patient with  history of non-small cell lung cancer of the right lung, on paclitaxel and carboplatin presents with fever after infusion yesterday.  Febrile with self-reported temperature of 104 at home  Was admitted 6/22-6/24/2024 for neutropenic fever with  WBC of 2.5 following infusion  In the ED, met SIRS with temperature of 102, tachycardia leukopenic but not neutropenic with ANC 2.08,  unknown source of Infection.  Lactic acid and Pro-Du within normal range  CT abdomen/ pelvis:interval progression of neoplastic disease, no evidence of infection  Suspect fever to be secondary to recent infusion versus infectious causes  Received Zosyn and IVF, in the ED    Plan:  Tylenol for fever  Monitor vitals  Follow-up on blood culture   Trend WBC/Fever curve  Observe off antibiotic at this time  Follow-up on blood culture      Malignant neoplasm of upper lobe, right bronchus or lung (HCC)  Assessment & Plan  Patient with history of right primary lung adenocarcinoma (non-small cell carcinoma) diagnosed in 2024  Currently being treated with radiation and chemo(carboplatin and paclitaxel)  Completed last chemotherapy yesterday and has radiation scheduled for tomorrow.  Per patient, last chemotherapy yesterday,but still require 2 more radiation treatment followed by imaging in 8 weeks to evaluate disease progression.  In the ED, CT C/A/P: interval progression of neoplastic disease     Plan:  May require radiation oncology consult if not discharged for radiation tomorrow      CKD (chronic kidney disease) stage 3, GFR 30-59 ml/min (HCC)  Assessment & Plan  Lab Results   Component Value Date    EGFR 64 07/02/2024    EGFR 68 07/01/2024    EGFR 76  06/24/2024    CREATININE 0.89 07/02/2024    CREATININE 0.85 07/01/2024    CREATININE 0.77 06/24/2024   Patient creatinine currently at baseline.  Avoid hypotension and nephrotoxins.  Monitor BMP    JAK2 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  Assessment & Plan  Patient with history of JAK2 positive essential thrombocytosis. Was on hydroxyurea therapy but stopped due to current chemotherapy.      Plan:  Continue to follow-up with oncology as outpatient    Hypertension  Assessment & Plan  History of hypertension currently not on antihypertensives.   Per patient, blood pressure medication was  discontinue on last hospital admission in June, with recommendation to follow-up with PCP outpatient  Reports blood pressure has been well-controlled at home.  Has an appointment with PCP tomorrow.    Plan:   Continue to monitor blood pressure  Continue with aspirin 81 mg daily           VTE Pharmacologic Prophylaxis: VTE Score: 7 High Risk (Score >/= 5) - Pharmacological DVT Prophylaxis Ordered: enoxaparin (Lovenox). Sequential Compression Devices Ordered.  Code Status: Level 1 - Full Code   Discussion with family: Updated  (daughter) via phone.  To call daughter but no answer    Anticipated Length of Stay: Patient will be admitted on an observation basis with an anticipated length of stay of less than 2 midnights secondary to leukopenic fever.    Chief Complaint: Fever    History of Present Illness:  Ayla Nye is a 73 y.o. female with a PMH of non-small cell lung,currently on chemo and radiation therapy , JAK2 positive essential thrombocytosis, hypertension, insomnia who presents with fever.Patient reports that she felt chills after returning from the infusion center yesterday. She ate dinner, and went to sleep but woke up with fever of 104. Her girlfriend brought her to the ED immediately. 6/22-6/24/2024, patient was admitted for neutropenic fever.  Prior to discharge, she endorsed hat her blood pressure medication was  discontinued with the recommendation to follow-up with her primary doctor to discuss possibly restarting.  She reports no primary care visits since returning home but has 1 scheduled tomorrow.  She does have chronic cough that started with chemo/radiation therapy but denies headache ,abdominal pain, vomiting, nausea, symptoms, or sick contact.    In the ED, met SIRS with temperature of 102, tachycardia leukopenic but not neutropenic with ANC 2.08,  unknown source of Infection.  Lactic acid and Pro-Du within normal range.Received Zosyn and IVF.       Review of Systems:  Review of Systems   Constitutional:  Positive for chills and fever.   HENT:  Negative for ear pain and sore throat.    Eyes:  Negative for pain and visual disturbance.   Respiratory:  Negative for cough and shortness of breath.    Cardiovascular:  Negative for chest pain and palpitations.   Gastrointestinal:  Negative for abdominal pain and vomiting.   Genitourinary:  Negative for dysuria and hematuria.   Musculoskeletal:  Negative for arthralgias and back pain.   Skin:  Negative for color change and rash.   Neurological:  Negative for seizures and syncope.   All other systems reviewed and are negative.      Past Medical and Surgical History:   Past Medical History:   Diagnosis Date    Allergic 2022    Allergic to neomiacin and cephalexin    Cataract Nov. 2023    Due to have durgery June 2024    Essential thrombocytosis (HCC)     controlled with medication    Hyperlipidemia     Hypertension     Mass in chest     Nodular goiter     Pneumonia Oct 16, 2023    Also  Feb 2024    Psoriasis     SIRS (systemic inflammatory response syndrome) (HCC) 06/22/2024       Past Surgical History:   Procedure Laterality Date    APPENDECTOMY      BREAST CYST EXCISION Right 2009    COLONOSCOPY  10/31/2018    DXA PROCEDURE (HISTORICAL)  04/21/2017    MAMMO (HISTORICAL)      8-24-18    MAMMO NEEDLE LOCALIZATION RIGHT (ALL INC) Right 07/13/2009    SKIN LESION EXCISION       bridge of nose       Meds/Allergies:  Prior to Admission medications    Medication Sig Start Date End Date Taking? Authorizing Provider   aspirin 81 MG tablet Take 81 mg by mouth daily    Yes Historical Provider, MD   Cholecalciferol (VITAMIN D3) 5000 units TABS 5,000 Units Daily    Yes Historical Provider, MD   diphenhydrAMINE (BENADRYL) 25 mg capsule Take 25 mg by mouth every 12 (twelve) hours as needed for itching    Yes Historical Provider, MD   Probiotic Product (PROBIOTIC DAILY PO) Take 1 capsule by mouth daily   Yes Historical Provider, MD   vitamin B-12 (VITAMIN B-12) 1,000 mcg tablet Take 1 tablet (1,000 mcg total) by mouth daily 9/14/23  Yes ZULEYMA Boland   ondansetron (ZOFRAN) 8 mg tablet Take 1 tablet (8 mg total) by mouth every 8 (eight) hours as needed for nausea or vomiting  Patient not taking: Reported on 7/3/2024 4/30/24   Rosalinda Castro MD   prochlorperazine (COMPAZINE) 10 mg tablet Take 1 tablet (10 mg total) by mouth every 6 (six) hours as needed for nausea or vomiting  Patient not taking: Reported on 7/1/2024 4/30/24   Rosalinda Castro MD   sucralfate (CARAFATE) 1 g tablet Take 1 tablet (1 g total) by mouth 3 (three) times a day Crush 1g tab in 5 mL water, 3x daily as needed  Patient not taking: Reported on 7/1/2024 6/13/24   Honey Gamboa MD     I have reviewed home medications with patient personally.    Allergies:   Allergies   Allergen Reactions    Doxycycline Headache    Keflex [Cephalexin] Rash    Neomycin-Polymyxin-Dexameth Eye Swelling       Social History:  Marital Status:    Occupation: N/a  Patient Pre-hospital Living Situation: Home  Patient Pre-hospital Level of Mobility: walks  Patient Pre-hospital Diet Restrictions: Regular  Substance Use History:   Social History     Substance and Sexual Activity   Alcohol Use Not Currently     Social History     Tobacco Use   Smoking Status Former    Current packs/day: 1.00    Average packs/day: 1 pack/day for 58.5 years  "(58.5 ttl pk-yrs)    Types: Cigarettes    Start date:    Smokeless Tobacco Never   Tobacco Comments    Quit      Social History     Substance and Sexual Activity   Drug Use Never       Family History:  Family History   Problem Relation Age of Onset    Stroke Mother     Depression Mother             Hypertension Mother     Hearing loss Mother     Tuberculosis Father     Cancer Brother         lung    Tuberculosis Brother     Coronary artery disease Brother     Hypertension Brother     No Known Problems Daughter     No Known Problems Daughter     No Known Problems Maternal Grandmother     No Known Problems Maternal Grandfather     No Known Problems Paternal Grandmother     No Known Problems Paternal Grandfather     No Known Problems Maternal Aunt     No Known Problems Maternal Aunt     No Known Problems Maternal Aunt     No Known Problems Maternal Aunt     No Known Problems Paternal Aunt     Cancer Brother         Throat cancer       Physical Exam:     Vitals:   Blood Pressure: 134/67 (24)  Pulse: 80 (24)  Temperature: 98.6 °F (37 °C) (24)  Temp Source: Oral (24)  Respirations: 16 (24)  Height: 5' 2\" (157.5 cm) (24 001)  Weight - Scale: 47.2 kg (104 lb) (24 001)  SpO2: 99 % (24)    Physical Exam  Vitals and nursing note reviewed.   Constitutional:       General: She is not in acute distress.     Appearance: She is well-developed.   HENT:      Head: Normocephalic and atraumatic.      Mouth/Throat:      Mouth: Mucous membranes are moist.   Eyes:      Conjunctiva/sclera: Conjunctivae normal.   Cardiovascular:      Rate and Rhythm: Normal rate and regular rhythm.      Heart sounds: No murmur heard.  Pulmonary:      Effort: Pulmonary effort is normal. No respiratory distress.      Breath sounds: Normal breath sounds.   Abdominal:      General: Abdomen is flat. Bowel sounds are normal.      Palpations: Abdomen is soft.      " "Tenderness: There is no abdominal tenderness.   Musculoskeletal:         General: No swelling.      Cervical back: Neck supple.   Skin:     General: Skin is warm and dry.      Capillary Refill: Capillary refill takes less than 2 seconds.   Neurological:      Mental Status: She is alert and oriented to person, place, and time.   Psychiatric:         Mood and Affect: Mood normal.          Additional Data:     Lab Results:  Results from last 7 days   Lab Units 07/03/24  0615 07/02/24  2335 07/01/24  1004   WBC Thousand/uL 2.06* 2.14* 2.50*   HEMOGLOBIN g/dL 8.3* 9.0* 9.0*   HEMATOCRIT % 26.2* 28.7* 29.0*   PLATELETS Thousands/uL 181 203 240   BANDS PCT %  --  7  --    SEGS PCT %  --   --  72   LYMPHO PCT %  --  1* 12*   MONO PCT %  --  2* 12   EOS PCT %  --  0 3     Results from last 7 days   Lab Units 07/02/24  2335   SODIUM mmol/L 137   POTASSIUM mmol/L 4.0   CHLORIDE mmol/L 108   CO2 mmol/L 17*   BUN mg/dL 13   CREATININE mg/dL 0.89   ANION GAP mmol/L 12   CALCIUM mg/dL 8.5   ALBUMIN g/dL 3.8   TOTAL BILIRUBIN mg/dL 0.32   ALK PHOS U/L 67   ALT U/L 6*   AST U/L 11*   GLUCOSE RANDOM mg/dL 181*     Results from last 7 days   Lab Units 07/03/24  0115   INR  1.00         No results found for: \"HGBA1C\"  Results from last 7 days   Lab Units 07/03/24  0115 07/02/24  2335   LACTIC ACID mmol/L 1.7  --    PROCALCITONIN ng/ml  --  0.09       Lines/Drains:  Invasive Devices       Peripheral Intravenous Line  Duration             Peripheral IV 07/02/24 Distal;Dorsal (posterior);Right Forearm <1 day                        Imaging: Reviewed radiology reports from this admission including: abdominal/pelvic CT  CT chest abdomen pelvis w contrast   Final Result by Vicky Umanzor MD (07/03 0346)      There has been interval progression of neoplastic disease, as described above. Please see discussion.      Mild hepatomegaly. Mild hepatic steatosis.      Mild colonic diverticulosis without evidence of acute diverticulitis. No " bowel obstruction.      Atherosclerosis. Coronary artery disease.      Other nonemergent findings as above.               Workstation performed: [id number]         XR chest 2 views   ED Interpretation by Evelio Gan MD (07/03 0111)   WET read: no acute cardiopulmonary diease. Lung mass in R upper lobe appears stable from last Xray.           EKG and Other Studies Reviewed on Admission:   EKG: No EKG obtained.    ** Please Note: This note has been constructed using a voice recognition system. **

## 2024-07-03 NOTE — H&P
[facility]  H&P  Name: Ayla Nye 73 y.o. female I MRN: [id number]  Unit/Bed#: ED-34 I Date of Admission: 7/2/2024   Date of Service: 7/3/2024 I Hospital Day: 0      Assessment & Plan   * SIRS (systemic inflammatory response syndrome) (HCC)  Assessment & Plan  Patient with  history of non-small cell lung cancer of the right lung, on paclitaxel and carboplatin presents with fever after infusion yesterday.  Febrile with self-reported temperature of 104 at home  Was admitted 6/22-6/24/2024 for neutropenic fever with  WBC of 2.5 following infusion  In the ED, met SIRS with temperature of 102, tachycardia leukopenic but not neutropenic with ANC 2.08,  unknown source of Infection.  Lactic acid and Pro-Du within normal range  CT abdomen/ pelvis:interval progression of neoplastic disease, no evidence of infection  Suspect fever to be secondary to recent infusion versus infectious causes  Received Zosyn and IVF, in the ED    Plan:  Tylenol for fever  Monitor vitals  Follow-up on blood culture   Trend WBC/Fever curve  Observe off antibiotic at this time  Follow-up on blood culture      Malignant neoplasm of upper lobe, right bronchus or lung (HCC)  Assessment & Plan  Patient with history of right primary lung adenocarcinoma (non-small cell carcinoma) diagnosed in 2024  Currently being treated with radiation and chemo(carboplatin and paclitaxel)  Completed last chemotherapy yesterday and has radiation scheduled for tomorrow.  Per patient, last chemotherapy yesterday,but still require 2 more radiation treatment followed by imaging in 8 weeks to evaluate disease progression.  In the ED, CT C/A/P: interval progression of neoplastic disease     Plan:  May require radiation oncology consult if not discharged for radiation tomorrow      CKD (chronic kidney disease) stage 3, GFR 30-59 ml/min (HCC)  Assessment & Plan  Lab Results   Component Value Date    EGFR 64 07/02/2024    EGFR 68 07/01/2024     EGFR 76 06/24/2024    CREATININE 0.89 07/02/2024    CREATININE 0.85 07/01/2024    CREATININE 0.77 06/24/2024   Patient creatinine currently at baseline.  Avoid hypotension and nephrotoxins.  Monitor BMP    JAK2 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  Assessment & Plan  Patient with history of JAK2 positive essential thrombocytosis. Was on hydroxyurea therapy but stopped due to current chemotherapy.      Plan:  Continue to follow-up with oncology as outpatient    Hypertension  Assessment & Plan  History of hypertension currently not on antihypertensives.   Per patient, blood pressure medication was  discontinue on last hospital admission in June, with recommendation to follow-up with PCP outpatient  Reports blood pressure has been well-controlled at home.  Has an appointment with PCP tomorrow.    Plan:   Continue to monitor blood pressure  Continue with aspirin 81 mg daily      VTE Pharmacologic Prophylaxis: VTE Score: 7 High Risk (Score >/= 5) - Pharmacological DVT Prophylaxis Ordered: enoxaparin (Lovenox). Sequential Compression Devices Ordered.  Code Status: Level 1 - Full Code   Discussion with family: Updated  (daughter) via phone. No answer    Anticipated Length of Stay: Patient will be admitted on an inpatient basis with an anticipated length of stay of greater than 2 midnights secondary to Fever.    Chief Complaint: Fever    History of Present Illness:  Ayla Nye is a 73 y.o. female with a PMH of non-small cell lung,currently on chemo and radiation therapy , JAK2 positive essential thrombocytosis, hypertension, insomnia who presents with fever.Patient reports that she felt chills after returning from the infusion center yesterday. She ate dinner, and went to sleep but woke up with fever of 104. Her girlfriend brought her to the ED immediately. 6/22-6/24/2024, patient was admitted for neutropenic fever.  Prior to discharge, she endorsed hat her blood pressure medication was discontinued with the recommendation  to follow-up with her primary doctor to discuss possibly restarting.  She reports no primary care visits since returning home but has 1 scheduled tomorrow.  She does have chronic cough that started with chemo/radiation therapy but denies headache ,abdominal pain, vomiting, nausea, symptoms, or sick contact.    In the ED, met SIRS with temperature of 102, tachycardia leukopenic but not neutropenic with ANC 2.08,  unknown source of Infection.  Lactic acid and Pro-Du within normal range.Received Zosyn and IVF.    Review of Systems:  Review of Systems   Constitutional:  Positive for chills and fever.   HENT:  Negative for ear pain and sore throat.    Eyes:  Negative for pain and visual disturbance.   Respiratory:  Negative for cough and shortness of breath.    Cardiovascular:  Negative for chest pain and palpitations.   Gastrointestinal:  Negative for abdominal pain and vomiting.   Genitourinary:  Negative for dysuria and hematuria.   Musculoskeletal:  Negative for arthralgias and back pain.   Skin:  Negative for color change and rash.   Neurological:  Negative for seizures and syncope.   All other systems reviewed and are negative.      Past Medical and Surgical History:   Past Medical History:   Diagnosis Date    Allergic 2022    Allergic to neomiacin and cephalexin    Cataract Nov. 2023    Due to have durgery June 2024    Essential thrombocytosis (HCC)     controlled with medication    Hyperlipidemia     Hypertension     Mass in chest     Nodular goiter     Pneumonia Oct 16, 2023    Also  Feb 2024    Psoriasis     SIRS (systemic inflammatory response syndrome) (HCC) 06/22/2024       Past Surgical History:   Procedure Laterality Date    APPENDECTOMY      BREAST CYST EXCISION Right 2009    COLONOSCOPY  10/31/2018    DXA PROCEDURE (HISTORICAL)  04/21/2017    MAMMO (HISTORICAL)      8-24-18    MAMMO NEEDLE LOCALIZATION RIGHT (ALL INC) Right 07/13/2009    SKIN LESION EXCISION      bridge of nose        Meds/Allergies:  Prior to Admission medications    Medication Sig Start Date End Date Taking? Authorizing Provider   aspirin 81 MG tablet Take 81 mg by mouth daily    Yes Historical Provider, MD   Cholecalciferol (VITAMIN D3) 5000 units TABS 5,000 Units Daily    Yes Historical Provider, MD   diphenhydrAMINE (BENADRYL) 25 mg capsule Take 25 mg by mouth every 12 (twelve) hours as needed for itching    Yes Historical Provider, MD   Probiotic Product (PROBIOTIC DAILY PO) Take 1 capsule by mouth daily   Yes Historical Provider, MD   vitamin B-12 (VITAMIN B-12) 1,000 mcg tablet Take 1 tablet (1,000 mcg total) by mouth daily 9/14/23  Yes ZULEYMA Boland   ondansetron (ZOFRAN) 8 mg tablet Take 1 tablet (8 mg total) by mouth every 8 (eight) hours as needed for nausea or vomiting  Patient not taking: Reported on 7/3/2024 4/30/24   Rosalinda Castro MD   prochlorperazine (COMPAZINE) 10 mg tablet Take 1 tablet (10 mg total) by mouth every 6 (six) hours as needed for nausea or vomiting  Patient not taking: Reported on 7/1/2024 4/30/24   Rosalinda Castro MD   sucralfate (CARAFATE) 1 g tablet Take 1 tablet (1 g total) by mouth 3 (three) times a day Crush 1g tab in 5 mL water, 3x daily as needed  Patient not taking: Reported on 7/1/2024 6/13/24   Honey Gamboa MD     I have reviewed home medications with patient personally.    Allergies:   Allergies   Allergen Reactions    Doxycycline Headache    Keflex [Cephalexin] Rash    Neomycin-Polymyxin-Dexameth Eye Swelling       Social History:  Marital Status:    Occupation: N/A  Patient Pre-hospital Living Situation: Home  Patient Pre-hospital Level of Mobility: walks  Patient Pre-hospital Diet Restrictions: Regular  Substance Use History:   Social History     Substance and Sexual Activity   Alcohol Use Not Currently     Social History     Tobacco Use   Smoking Status Former    Current packs/day: 1.00    Average packs/day: 1 pack/day for 58.5 years (58.5 ttl pk-yrs)     "Types: Cigarettes    Start date:    Smokeless Tobacco Never   Tobacco Comments    Quit      Social History     Substance and Sexual Activity   Drug Use Never       Family History:  Family History   Problem Relation Age of Onset    Stroke Mother     Depression Mother             Hypertension Mother     Hearing loss Mother     Tuberculosis Father     Cancer Brother         lung    Tuberculosis Brother     Coronary artery disease Brother     Hypertension Brother     No Known Problems Daughter     No Known Problems Daughter     No Known Problems Maternal Grandmother     No Known Problems Maternal Grandfather     No Known Problems Paternal Grandmother     No Known Problems Paternal Grandfather     No Known Problems Maternal Aunt     No Known Problems Maternal Aunt     No Known Problems Maternal Aunt     No Known Problems Maternal Aunt     No Known Problems Paternal Aunt     Cancer Brother         Throat cancer       Physical Exam:     Vitals:   Blood Pressure: 134/67 (24)  Pulse: 80 (24)  Temperature: 98.6 °F (37 °C) (24)  Temp Source: Oral (24)  Respirations: 16 (24)  Height: 5' 2\" (157.5 cm) (24)  Weight - Scale: 47.2 kg (104 lb) (24)  SpO2: 99 % (24)    Physical Exam  Vitals and nursing note reviewed.   Constitutional:       General: She is not in acute distress.     Appearance: She is well-developed.   HENT:      Head: Normocephalic and atraumatic.      Mouth/Throat:      Mouth: Mucous membranes are moist.   Eyes:      Conjunctiva/sclera: Conjunctivae normal.   Cardiovascular:      Rate and Rhythm: Normal rate and regular rhythm.      Heart sounds: No murmur heard.  Pulmonary:      Effort: Pulmonary effort is normal. No respiratory distress.      Breath sounds: Normal breath sounds.   Abdominal:      Palpations: Abdomen is soft.      Tenderness: There is no abdominal tenderness.   Musculoskeletal:         General: " "No swelling.      Cervical back: Neck supple.   Skin:     General: Skin is warm and dry.      Capillary Refill: Capillary refill takes less than 2 seconds.   Neurological:      Mental Status: She is alert and oriented to person, place, and time.   Psychiatric:         Mood and Affect: Mood normal.          Additional Data:     Lab Results:  Results from last 7 days   Lab Units 07/03/24  0615 07/02/24  2335 07/01/24  1004   WBC Thousand/uL 2.06* 2.14* 2.50*   HEMOGLOBIN g/dL 8.3* 9.0* 9.0*   HEMATOCRIT % 26.2* 28.7* 29.0*   PLATELETS Thousands/uL 181 203 240   BANDS PCT %  --  7  --    SEGS PCT %  --   --  72   LYMPHO PCT %  --  1* 12*   MONO PCT %  --  2* 12   EOS PCT %  --  0 3     Results from last 7 days   Lab Units 07/02/24  2335   SODIUM mmol/L 137   POTASSIUM mmol/L 4.0   CHLORIDE mmol/L 108   CO2 mmol/L 17*   BUN mg/dL 13   CREATININE mg/dL 0.89   ANION GAP mmol/L 12   CALCIUM mg/dL 8.5   ALBUMIN g/dL 3.8   TOTAL BILIRUBIN mg/dL 0.32   ALK PHOS U/L 67   ALT U/L 6*   AST U/L 11*   GLUCOSE RANDOM mg/dL 181*     Results from last 7 days   Lab Units 07/03/24  0115   INR  1.00         No results found for: \"HGBA1C\"  Results from last 7 days   Lab Units 07/03/24  0115 07/02/24  2335   LACTIC ACID mmol/L 1.7  --    PROCALCITONIN ng/ml  --  0.09       Lines/Drains:  Invasive Devices       Peripheral Intravenous Line  Duration             Peripheral IV 07/02/24 Distal;Dorsal (posterior);Right Forearm <1 day                        Imaging: Reviewed radiology reports from this admission including: abdominal/pelvic CT  CT chest abdomen pelvis w contrast   Final Result by Vicky Umanzor MD (07/03 0346)      There has been interval progression of neoplastic disease, as described above. Please see discussion.      Mild hepatomegaly. Mild hepatic steatosis.      Mild colonic diverticulosis without evidence of acute diverticulitis. No bowel obstruction.      Atherosclerosis. Coronary artery disease.      Other " nonemergent findings as above.               Workstation performed: [id number]         XR chest 2 views   ED Interpretation by Evelio Gan MD (07/03 0111)   WET read: no acute cardiopulmonary diease. Lung mass in R upper lobe appears stable from last Xray.           EKG and Other Studies Reviewed on Admission:   EKG: No EKG obtained.    ** Please Note: This note has been constructed using a voice recognition system. **

## 2024-07-03 NOTE — ASSESSMENT & PLAN NOTE
Patient with  history of non-small cell lung cancer of the right lung, on paclitaxel and carboplatin presents with fever after infusion yesterday.  Febrile with self-reported temperature of 104 at home  Was admitted 6/22-6/24/2024 for neutropenic fever with  WBC of 2.5 following infusion  In the ED, met SIRS with temperature of 102, tachycardia leukopenic but not neutropenic with ANC 2.08,  unknown source of Infection.  Lactic acid and Pro-Du within normal range  CT abdomen/ pelvis:interval progression of neoplastic disease, no evidence of infection  Suspect fever to be secondary to recent infusion versus infectious causes  Received Zosyn and IVF, in the ED    Plan:  Tylenol for fever  Monitor vitals  Follow-up on blood culture   Trend WBC/Fever curve  Observe off antibiotic at this time  Follow-up on blood culture

## 2024-07-03 NOTE — ED NOTES
Plan for radiation treatment at [id number]. Discussed with Rad/Onc RN.      Carmel Devine RN  07/03/24 [id number]

## 2024-07-03 NOTE — PLAN OF CARE
Problem: PAIN - ADULT  Goal: Verbalizes/displays adequate comfort level or baseline comfort level  Description: Interventions:  - Encourage patient to monitor pain and request assistance  - Assess pain using appropriate pain scale  - Administer analgesics based on type and severity of pain and evaluate response  - Implement non-pharmacological measures as appropriate and evaluate response  - Consider cultural and social influences on pain and pain management  - Notify physician/advanced practitioner if interventions unsuccessful or patient reports new pain  Outcome: Progressing     Problem: INFECTION - ADULT  Goal: Absence or prevention of progression during hospitalization  Description: INTERVENTIONS:  - Assess and monitor for signs and symptoms of infection  - Monitor lab/diagnostic results  - Monitor all insertion sites, i.e. indwelling lines, tubes, and drains  - Monitor endotracheal if appropriate and nasal secretions for changes in amount and color  - Alva appropriate cooling/warming therapies per order  - Administer medications as ordered  - Instruct and encourage patient and family to use good hand hygiene technique  - Identify and instruct in appropriate isolation precautions for identified infection/condition  Outcome: Progressing  Goal: Absence of fever/infection during neutropenic period  Description: INTERVENTIONS:  - Monitor WBC    Outcome: Progressing     Problem: SAFETY ADULT  Goal: Patient will remain free of falls  Description: INTERVENTIONS:  - Educate patient/family on patient safety including physical limitations  - Instruct patient to call for assistance with activity   - Consult OT/PT to assist with strengthening/mobility   - Keep Call bell within reach  - Keep bed low and locked with side rails adjusted as appropriate  - Keep care items and personal belongings within reach  - Initiate and maintain comfort rounds  - Apply yellow socks and bracelet for high fall risk patients  - Consider  moving patient to room near nurses station  Outcome: Progressing  Goal: Maintain or return to baseline ADL function  Description: INTERVENTIONS:  -  Assess patient's ability to carry out ADLs; assess patient's baseline for ADL function and identify physical deficits which impact ability to perform ADLs (bathing, care of mouth/teeth, toileting, grooming, dressing, etc.)  - Assess/evaluate cause of self-care deficits   - Assess range of motion  - Assess patient's mobility; develop plan if impaired  - Assess patient's need for assistive devices and provide as appropriate  - Encourage maximum independence but intervene and supervise when necessary  - Involve family in performance of ADLs  - Assess for home care needs following discharge   - Consider OT consult to assist with ADL evaluation and planning for discharge  - Provide patient education as appropriate  Outcome: Progressing  Goal: Maintains/Returns to pre admission functional level  Description: INTERVENTIONS:  - Perform AM-PAC 6 Click Basic Mobility/ Daily Activity assessment daily.  - Set and communicate daily mobility goal to care team and patient/family/caregiver.   - Collaborate with rehabilitation services on mobility goals if consulted  - Out of bed for toileting  - Record patient progress and toleration of activity level   Outcome: Progressing     Problem: DISCHARGE PLANNING  Goal: Discharge to home or other facility with appropriate resources  Description: INTERVENTIONS:  - Identify barriers to discharge w/patient and caregiver  - Arrange for needed discharge resources and transportation as appropriate  - Identify discharge learning needs (meds, wound care, etc.)  - Arrange for interpretive services to assist at discharge as needed  - Refer to Case Management Department for coordinating discharge planning if the patient needs post-hospital services based on physician/advanced practitioner order or complex needs related to functional status, cognitive  ability, or social support system  Outcome: Progressing     Problem: Knowledge Deficit  Goal: Patient/family/caregiver demonstrates understanding of disease process, treatment plan, medications, and discharge instructions  Description: Complete learning assessment and assess knowledge base.  Interventions:  - Provide teaching at level of understanding  - Provide teaching via preferred learning methods  Outcome: Progressing     Problem: Nutrition/Hydration-ADULT  Goal: Nutrient/Hydration intake appropriate for improving, restoring or maintaining nutritional needs  Description: Monitor and assess patient's nutrition/hydration status for malnutrition. Collaborate with interdisciplinary team and initiate plan and interventions as ordered.  Monitor patient's weight and dietary intake as ordered or per policy. Utilize nutrition screening tool and intervene as necessary. Determine patient's food preferences and provide high-protein, high-caloric foods as appropriate.     INTERVENTIONS:  - Monitor oral intake, urinary output, labs, and treatment plans  - Assess nutrition and hydration status and recommend course of action  - Evaluate amount of meals eaten  - Assist patient with eating if necessary   - Allow adequate time for meals  - Recommend/ encourage appropriate diets, oral nutritional supplements, and vitamin/mineral supplements  - Order, calculate, and assess calorie counts as needed  - Recommend, monitor, and adjust tube feedings and TPN/PPN based on assessed needs  - Assess need for intravenous fluids  - Provide specific nutrition/hydration education as appropriate  - Include patient/family/caregiver in decisions related to nutrition  Outcome: Progressing

## 2024-07-03 NOTE — QUICK NOTE
Radiation Oncology Treatment Note     A treatment dose of 200 cGy was administered today to the involved tumor site(s) Right Lung and [place] using 1-10 MV photons.  Present total dose at this time is 5800 cGy.  Approximately 1  more treatment before completion of treatments or critical review.

## 2024-07-03 NOTE — ASSESSMENT & PLAN NOTE
History of hypertension currently not on antihypertensives.   Per patient, blood pressure medication was  discontinue on last hospital admission in June, with recommendation to follow-up with PCP outpatient  Reports blood pressure has been well-controlled at home.  Has an appointment with PCP tomorrow.    Plan:   Continue to monitor blood pressure  Continue with aspirin 81 mg daily

## 2024-07-03 NOTE — ED ATTENDING ATTESTATION
7/2/2024  I, Carlos Burroughs MD, saw and evaluated the patient. I have discussed the patient with the resident/non-physician practitioner and agree with the resident's/non-physician practitioner's findings, Plan of Care, and MDM as documented in the resident's/non-physician practitioner's note, except where noted. All available labs and Radiology studies were reviewed.  I was present for key portions of any procedure(s) performed by the resident/non-physician practitioner and I was immediately available to provide assistance.       At this point I agree with the current assessment done in the Emergency Department.  I have conducted an independent evaluation of this patient a history and physical is as follows: Patient is a 73 year old female with fever tonight and just had chemotherapy. (+) cough. No sore throat. No N/v/D. No urinary sx. No travel. (+) ill contact with covid recently. States she tested negative at home for covid. Was last seen at [facility] on 7/1/24 for lung cancer. [facility] website checked on this patient and no Rx found. NCAT. Moist mucous membranes. Pharynx clear. Lungs clear. Heart regular without murmur. Abdomen soft and nontender. Good bowel sounds. No edema. No rash noted. DDx including but not limited to: neutropenia, viral illness, pneumonia, URI, OM, pharyngitis, influenza, cellulitis, UTI; doubt meningitis, meningococcemia, sinusitis, Lyme disease, West Nile virus; COVID-19 (novel coronavirus).  Will check labs and CXR.     ED Course         Critical Care Time  Procedures

## 2024-07-03 NOTE — HOSPITAL COURSE
Ayla Nye is a 73 y.o. female with a PMH of non-small cell lung, completed chemo and infusions. 2 radiation therapy left, JAK2 positive essential thrombocytosis, hypertension, insomnia who presents with fever following infusion.     Patient reports that she felt chills after returning from the infusion center yesterday. She ate dinner, and went to sleep but woke up with fever of 104. Her girlfriend brought her to the ED immediately. 6/22-6/24/2024, patient was admitted for neutropenic fever.  Prior to discharge, she endorsed hat her blood pressure medication was discontinued with the recommendation to follow-up with her primary doctor to discuss possibly restarting.      She reports no primary care visits since returning home but has 1 scheduled tomorrow.  She does have chronic cough that started with chemo/radiation therapy but denies headache ,abdominal pain, vomiting, nausea, symptoms, or sick contact.     In the ED, met SIRS with temperature of 102, tachycardia leukopenic but not neutropenic with ANC 2.08,  unknown source of Infection.  Lactic acid and Pro-Du within normal range.Received Zosyn and IVF.

## 2024-07-03 NOTE — ASSESSMENT & PLAN NOTE
Lab Results   Component Value Date    EGFR 64 07/02/2024    EGFR 68 07/01/2024    EGFR 76 06/24/2024    CREATININE 0.89 07/02/2024    CREATININE 0.85 07/01/2024    CREATININE 0.77 06/24/2024   Patient creatinine currently at baseline.  Avoid hypotension and nephrotoxins.  Monitor BMP

## 2024-07-03 NOTE — ASSESSMENT & PLAN NOTE
Patient with history of JAK2 positive essential thrombocytosis. Was on hydroxyurea therapy but stopped due to current chemotherapy.      Plan:  Continue to follow-up with oncology as outpatient

## 2024-07-04 VITALS
DIASTOLIC BLOOD PRESSURE: 77 MMHG | HEIGHT: 62 IN | OXYGEN SATURATION: 97 % | HEART RATE: 69 BPM | BODY MASS INDEX: 20.43 KG/M2 | TEMPERATURE: 98 F | SYSTOLIC BLOOD PRESSURE: 134 MMHG | RESPIRATION RATE: 18 BRPM | WEIGHT: 111 LBS

## 2024-07-04 LAB
ATRIAL RATE: 94 BPM
ERYTHROCYTE [DISTWIDTH] IN BLOOD BY AUTOMATED COUNT: 15.7 % (ref 11.6–15.1)
HCT VFR BLD AUTO: 25.6 % (ref 34.8–46.1)
HGB BLD-MCNC: 8 G/DL (ref 11.5–15.4)
MCH RBC QN AUTO: 31 PG (ref 26.8–34.3)
MCHC RBC AUTO-ENTMCNC: 31.3 G/DL (ref 31.4–37.4)
MCV RBC AUTO: 99 FL (ref 82–98)
P AXIS: 70 DEGREES
PLATELET # BLD AUTO: 166 THOUSANDS/UL (ref 149–390)
PMV BLD AUTO: 9 FL (ref 8.9–12.7)
PR INTERVAL: 138 MS
QRS AXIS: 53 DEGREES
QRSD INTERVAL: 66 MS
QT INTERVAL: 372 MS
QTC INTERVAL: 465 MS
RBC # BLD AUTO: 2.58 MILLION/UL (ref 3.81–5.12)
T WAVE AXIS: 33 DEGREES
VENTRICULAR RATE: 94 BPM
WBC # BLD AUTO: 1.48 THOUSAND/UL (ref 4.31–10.16)

## 2024-07-04 PROCEDURE — 93010 ELECTROCARDIOGRAM REPORT: CPT | Performed by: INTERNAL MEDICINE

## 2024-07-04 PROCEDURE — 85027 COMPLETE CBC AUTOMATED: CPT

## 2024-07-04 PROCEDURE — 99239 HOSP IP/OBS DSCHRG MGMT >30: CPT | Performed by: INTERNAL MEDICINE

## 2024-07-04 RX ADMIN — CYANOCOBALAMIN TAB 500 MCG 1000 MCG: 500 TAB at 09:04

## 2024-07-04 RX ADMIN — Medication 5000 UNITS: at 09:04

## 2024-07-04 RX ADMIN — ASPIRIN 81 MG 81 MG: 81 TABLET ORAL at 09:04

## 2024-07-04 NOTE — ASSESSMENT & PLAN NOTE
Patient with history of right primary lung adenocarcinoma (non-small cell carcinoma) diagnosed in 2024  Treated with radiation and chemo(carboplatin and paclitaxel)  One radiation treatment given here.  In the ED, CT C/A/P: interval progression of neoplastic disease     Plan:  Imaging in 8 weeks to evaluate disease progression.

## 2024-07-04 NOTE — DISCHARGE INSTR - AVS FIRST PAGE
Dear Ayla Nye,     It was our pleasure to care for you here at [facility].  It is our hope that we were always able to exceed the expected standards for your care during your stay.  You were hospitalized due to fever.  You were cared for on the 4th floor by Michael Mock MD under the service of Lili Stone* with the [facility] who covers for your primary care physician (PCP), Rafy Angelo MD, while you were hospitalized.  If you have any questions or concerns related to this hospitalization, you may contact us at .  For follow up as well as any medication refills, we recommend that you follow up with your primary care physician.  A registered nurse will reach out to you by phone within a few days after your discharge to answer any additional questions that you may have after going home.  However, at this time we provide for you here, the most important instructions / recommendations at discharge:     Notable Medication Adjustments -   Please take your medicines as prescribed.  No new medications given.  Testing Required after Discharge -   None  Important follow up information -   Please follow-up with your PCP within a week of discharge  Follow up with Radiation Oncology as scheduled.  Other Instructions -   Please take all your medications regularly as directed  If symptoms return/persist contact your PCP if unable then visit to nearest ER  Please review this entire after visit summary as additional general instructions including medication list, appointments, activity, diet, any pertinent wound care, and other additional recommendations from your care team that may be provided for you.      Sincerely,     Michael Mock MD

## 2024-07-04 NOTE — PLAN OF CARE
Problem: PAIN - ADULT  Goal: Verbalizes/displays adequate comfort level or baseline comfort level  Description: Interventions:  - Encourage patient to monitor pain and request assistance  - Assess pain using appropriate pain scale  - Administer analgesics based on type and severity of pain and evaluate response  - Implement non-pharmacological measures as appropriate and evaluate response  - Consider cultural and social influences on pain and pain management  - Notify physician/advanced practitioner if interventions unsuccessful or patient reports new pain  Outcome: Progressing     Problem: INFECTION - ADULT  Goal: Absence or prevention of progression during hospitalization  Description: INTERVENTIONS:  - Assess and monitor for signs and symptoms of infection  - Monitor lab/diagnostic results  - Monitor all insertion sites, i.e. indwelling lines, tubes, and drains  - Monitor endotracheal if appropriate and nasal secretions for changes in amount and color  - Steele appropriate cooling/warming therapies per order  - Administer medications as ordered  - Instruct and encourage patient and family to use good hand hygiene technique  - Identify and instruct in appropriate isolation precautions for identified infection/condition  Outcome: Progressing  Goal: Absence of fever/infection during neutropenic period  Description: INTERVENTIONS:  - Monitor WBC    Outcome: Progressing     Problem: SAFETY ADULT  Goal: Patient will remain free of falls  Description: INTERVENTIONS:  - Educate patient/family on patient safety including physical limitations  - Instruct patient to call for assistance with activity   - Consult OT/PT to assist with strengthening/mobility   - Keep Call bell within reach  - Keep bed low and locked with side rails adjusted as appropriate  - Keep care items and personal belongings within reach  - Initiate and maintain comfort rounds  - Make Fall Risk Sign visible to staff  - Offer Toileting every  Hours,  in advance of need  - Initiate/Maintain alarm  - Obtain necessary fall risk management equipment:   - Apply yellow socks and bracelet for high fall risk patients  - Consider moving patient to room near nurses station  Outcome: Progressing  Goal: Maintain or return to baseline ADL function  Description: INTERVENTIONS:  -  Assess patient's ability to carry out ADLs; assess patient's baseline for ADL function and identify physical deficits which impact ability to perform ADLs (bathing, care of mouth/teeth, toileting, grooming, dressing, etc.)  - Assess/evaluate cause of self-care deficits   - Assess range of motion  - Assess patient's mobility; develop plan if impaired  - Assess patient's need for assistive devices and provide as appropriate  - Encourage maximum independence but intervene and supervise when necessary  - Involve family in performance of ADLs  - Assess for home care needs following discharge   - Consider OT consult to assist with ADL evaluation and planning for discharge  - Provide patient education as appropriate  Outcome: Progressing  Goal: Maintains/Returns to pre admission functional level  Description: INTERVENTIONS:  - Perform AM-PAC 6 Click Basic Mobility/ Daily Activity assessment daily.  - Set and communicate daily mobility goal to care team and patient/family/caregiver.   - Collaborate with rehabilitation services on mobility goals if consulted  - Perform Range of Motion 3 times a day.  - Reposition patient every 3 hours.  - Dangle patient 3 times a day  - Stand patient 3 times a day  - Ambulate patient 3 times a day  - Out of bed to chair 3 times a day   - Out of bed for meals 3 times a day  - Out of bed for toileting  - Record patient progress and toleration of activity level   Outcome: Progressing     Problem: DISCHARGE PLANNING  Goal: Discharge to home or other facility with appropriate resources  Description: INTERVENTIONS:  - Identify barriers to discharge w/patient and caregiver  -  Arrange for needed discharge resources and transportation as appropriate  - Identify discharge learning needs (meds, wound care, etc.)  - Arrange for interpretive services to assist at discharge as needed  - Refer to Case Management Department for coordinating discharge planning if the patient needs post-hospital services based on physician/advanced practitioner order or complex needs related to functional status, cognitive ability, or social support system  Outcome: Progressing

## 2024-07-04 NOTE — DISCHARGE SUMMARY
[facility]  Discharge- Ayla Nye 1950, 73 y.o. female MRN: [id number]  Unit/Bed#: W -01 Encounter: [id number]  Primary Care Provider: Rafy Angelo MD   Date and time admitted to hospital: 7/2/2024 11:45 PM    * SIRS (systemic inflammatory response syndrome) (HCC)  Assessment & Plan  Assessment:  Patient with  history of non-small cell lung cancer of the right lung, completed paclitaxel and carboplatin presents with fever after infusion 7/3. She was febrile with self-reported temperature of 104 at home. Previously admitted 6/22-6/24/2024 for neutropenic fever with  WBC of 2.5 following infusion. In the ED, met SIRS with temperature of 102, tachycardia leukopenic but not neutropenic with ANC 2.08,  unknown source of Infection.  Lactic acid and Pro-Du within normal range. Suspect fever to be secondary to recent infusion versus infectious causes. Received Zosyn and IVF, in the ED. CBC and vitals monitored for 24 hours with symptom resolution.     Plan:  Last radiation treatment tomorrow.   Follow up with Heme/Onc July 15th.      Malignant neoplasm of upper lobe, right bronchus or lung (HCC)  Assessment & Plan  Patient with history of right primary lung adenocarcinoma (non-small cell carcinoma) diagnosed in 2024  Treated with radiation and chemo(carboplatin and paclitaxel)  One radiation treatment given here.  In the ED, CT C/A/P: interval progression of neoplastic disease     Plan:  Imaging in 8 weeks to evaluate disease progression.    CKD (chronic kidney disease) stage 3, GFR 30-59 ml/min ([facility])  Assessment & Plan  Lab Results   Component Value Date    EGFR 61 07/03/2024    EGFR 64 07/02/2024    EGFR 68 07/01/2024    CREATININE 0.93 07/03/2024    CREATININE 0.89 07/02/2024    CREATININE 0.85 07/01/2024   Patient creatinine currently at baseline.  There were no interventions during this admission.     JAK2 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  Assessment & Plan  Patient with history of JAK2  positive essential thrombocytosis. Was on hydroxyurea therapy but stopped due to current chemotherapy.      Plan:  Continue to follow-up with oncology as outpatient    Hypertension  Assessment & Plan  Assessment:  History of hypertension currently not on antihypertensives. Per patient, blood pressure medication was  discontinue on last hospital admission in June, with recommendation to follow-up with PCP outpatient. Reports blood pressure has been well-controlled at home.    Plan:   Continue to monitor blood pressure  Continue with aspirin 81 mg daily  Follow up with PCP        Medical Problems       Resolved Problems  Date Reviewed: 7/4/2024   None       Discharging Resident: Michael Mock MD  Discharging Attending: Lili Stone*  PCP: Rafy Angelo MD  Admission Date:   Admission Orders (From admission, onward)       Ordered        07/03/24 0506  Place in Observation  Once                          Discharge Date: 07/04/24    Consultations During Hospital Stay:  None    Procedures Performed:   None    Significant Findings / Test Results:   None    Incidental Findings:   There is an approximately 4.6 x 4.8 x 3.3 cm mass within the anteromedial aspect of the right upper lobe of the lung. This mass abuts the anterior and anteromedial pleural surface and the right hand aspect of the upper mediastinum. The mass   demonstrates peripheral spiculation and areas of low density centrally, suggesting areas of necrosis. Just posterolateral to this mass there is a spiculated satellite lesion measuring approximately 1.6 cm. There is also a spiculated satellite lesion   anterior and inferior to the dominant mass, measuring approximately 1.4 cm and abutting the anterior pleural surface (series 302 image 91); this satellite mass appears new compared with February 28, 2024. An ill-defined groundglass opacity in the left   upper lobe of the lung measures approximately 2.5 cm, similar to the prior study.   There is a 2.4 x  2.2 cm right paratracheal node demonstrating low density centrally suggesting necrosis; this is larger compared with February 28, 2024. Additionally, there is a 2.8 x 2 cm precarinal node demonstrating low density   centrally suggesting necrosis, also larger compared to the prior study. Necrotic appearing right hilar adenopathy measuring up to 2 cm.   The liver is mildly enlarged, measuring approximately 19 cm craniocaudal dimension. There is mild hepatic steatosis.   Tiny thyroid nodules, measuring 6 mm or less. Incidental discovery of one or more thyroid nodule(s) measuring less than 1.5 cm and without suspicious features is noted in this patient who is above 35 years old; according to   guidelines published in the February 2015 white paper on incidental thyroid nodules in the Journal of the American College of Radiology (JACR), no further evaluation is recommended.   There is a 2 mm nonobstructing calculus in the upper pole right kidney. No hydronephrosis bilaterally.       Test Results Pending at Discharge (will require follow up):  None     Outpatient Tests Requested:  None    Complications:  None    Reason for Admission: Fever    Hospital Course:   Ayla Nye is a 73 y.o. female patient who originally presented to the hospital on 7/2/2024 due to fever following infusion chemo for recently diagnosed NSCLC of RUL.    Hospital Course: Patient with  history of non-small cell lung cancer of the right lung, completed paclitaxel and carboplatin presents with fever after infusion 7/3. She was febrile with self-reported temperature of 104 at home. Previously admitted 6/22-6/24/2024 for neutropenic fever with  WBC of 2.5 following infusion. In the ED, met SIRS with temperature of 102, tachycardia leukopenic but not neutropenic with ANC 2.08,  unknown source of Infection.  Lactic acid and Pro-Du within normal range. Suspect fever to be secondary to recent infusion versus infectious causes. Received Zosyn and IVF  "in the ED. CBC and vitals monitored for 24 hours with symptom resolution.     Please see above list of diagnoses and related plan for additional information.     Condition at Discharge: good    Discharge Day Visit / Exam:   Subjective:  Patient reports improvement in symptoms this morning.  No new symptoms reported.  No overnight events.     Vitals: Blood Pressure: 134/77 (07/04/24 0741)  Pulse: 69 (07/04/24 0741)  Temperature: 98 °F (36.7 °C) (07/04/24 0741)  Temp Source: Oral (07/03/24 1606)  Respirations: 18 (07/04/24 0741)  Height: 5' 2\" (157.5 cm) (07/03/24 0012)  Weight - Scale: 50.3 kg (111 lb) (07/03/24 1636)  SpO2: 97 % (07/04/24 0741)  Exam:   Physical Exam  Constitutional:       General: She is not in acute distress.     Appearance: Normal appearance. She is not toxic-appearing.   HENT:      Head: Normocephalic and atraumatic.   Eyes:      Extraocular Movements: Extraocular movements intact.      Conjunctiva/sclera: Conjunctivae normal.      Pupils: Pupils are equal, round, and reactive to light.   Cardiovascular:      Rate and Rhythm: Normal rate and regular rhythm.      Heart sounds: Normal heart sounds. No murmur heard.     No gallop.   Pulmonary:      Effort: Pulmonary effort is normal. No respiratory distress.      Breath sounds: Normal breath sounds. No wheezing.   Chest:      Chest wall: No tenderness.   Abdominal:      General: Abdomen is flat. There is no distension.      Palpations: Abdomen is soft.      Tenderness: There is no abdominal tenderness. There is no guarding.   Neurological:      General: No focal deficit present.      Mental Status: She is alert and oriented to person, place, and time. Mental status is at baseline.   Psychiatric:         Mood and Affect: Mood normal.         Behavior: Behavior normal.         Thought Content: Thought content normal.         Judgment: Judgment normal.          Discussion with Family: Patient declined call to .     Discharge " instructions/Information to patient and family:   See after visit summary for information provided to patient and family.      Provisions for Follow-Up Care:  See after visit summary for information related to follow-up care and any pertinent home health orders.      Mobility at time of Discharge:   Basic Mobility Inpatient Raw Score: 23  JH-HLM Goal: 7: Walk 25 feet or more  JH-HLM Achieved: 6: Walk 10 steps or more  HLM Goal achieved. Continue to encourage appropriate mobility.     Disposition:   Home    Planned Readmission: No    Discharge Medications:  See after visit summary for reconciled discharge medications provided to patient and/or family.      **Please Note: This note may have been constructed using a voice recognition system**

## 2024-07-04 NOTE — ASSESSMENT & PLAN NOTE
Lab Results   Component Value Date    EGFR 61 07/03/2024    EGFR 64 07/02/2024    EGFR 68 07/01/2024    CREATININE 0.93 07/03/2024    CREATININE 0.89 07/02/2024    CREATININE 0.85 07/01/2024   Patient creatinine currently at baseline.  There were no interventions during this admission.

## 2024-07-04 NOTE — QUICK NOTE
Patient reported mild dysphagia for which she spoke to Dr. Gamboa.  We called her after discharge to contact PCP or Dr. Gamboa in the event symptoms do not resolve 1-2 weeks following last radiation treatment tomorrow as expected.

## 2024-07-04 NOTE — ASSESSMENT & PLAN NOTE
Assessment:  History of hypertension currently not on antihypertensives. Per patient, blood pressure medication was  discontinue on last hospital admission in June, with recommendation to follow-up with PCP outpatient. Reports blood pressure has been well-controlled at home.    Plan:   Continue to monitor blood pressure  Continue with aspirin 81 mg daily  Follow up with PCP

## 2024-07-04 NOTE — ASSESSMENT & PLAN NOTE
Assessment:  Patient with  history of non-small cell lung cancer of the right lung, completed paclitaxel and carboplatin presents with fever after infusion 7/3. She was febrile with self-reported temperature of 104 at home. Previously admitted 6/22-6/24/2024 for neutropenic fever with  WBC of 2.5 following infusion. In the ED, met SIRS with temperature of 102, tachycardia leukopenic but not neutropenic with ANC 2.08,  unknown source of Infection.  Lactic acid and Pro-Du within normal range. Suspect fever to be secondary to recent infusion versus infectious causes. Received Zosyn and IVF, in the ED. CBC and vitals monitored for 24 hours with symptom resolution.     Plan:  Last radiation treatment tomorrow.   Follow up with Heme/Onc July 15th.

## 2024-07-05 ENCOUNTER — TELEPHONE (OUTPATIENT)
Age: 74
End: 2024-07-05

## 2024-07-05 ENCOUNTER — APPOINTMENT (OUTPATIENT)
Dept: RADIATION ONCOLOGY | Facility: HOSPITAL | Age: 74
End: 2024-07-05
Attending: INTERNAL MEDICINE
Payer: MEDICARE

## 2024-07-05 PROCEDURE — 77336 RADIATION PHYSICS CONSULT: CPT | Performed by: INTERNAL MEDICINE

## 2024-07-05 PROCEDURE — 77386 HB NTSTY MODUL RAD TX DLVR CPLX: CPT | Performed by: RADIOLOGY

## 2024-07-05 PROCEDURE — 77014 CHG CT GUIDANCE RADIATION THERAPY FLDS PLACEMENT: CPT | Performed by: RADIOLOGY

## 2024-07-05 NOTE — TELEPHONE ENCOUNTER
Called pt to schedule a tcm apt with doctor, went to voicemail, left a detailed message for a call back .

## 2024-07-07 LAB
BACTERIA BLD CULT: NORMAL
BACTERIA BLD CULT: NORMAL

## 2024-07-08 LAB
BACTERIA BLD CULT: NORMAL
BACTERIA BLD CULT: NORMAL

## 2024-07-09 ENCOUNTER — OFFICE VISIT (OUTPATIENT)
Age: 74
End: 2024-07-09
Payer: MEDICARE

## 2024-07-09 ENCOUNTER — TRANSITIONAL CARE MANAGEMENT (OUTPATIENT)
Age: 74
End: 2024-07-09

## 2024-07-09 VITALS
WEIGHT: 104 LBS | TEMPERATURE: 96.6 F | BODY MASS INDEX: 19.14 KG/M2 | HEART RATE: 82 BPM | OXYGEN SATURATION: 98 % | SYSTOLIC BLOOD PRESSURE: 124 MMHG | HEIGHT: 62 IN | RESPIRATION RATE: 16 BRPM | DIASTOLIC BLOOD PRESSURE: 78 MMHG

## 2024-07-09 DIAGNOSIS — L40.50 PSORIATIC ARTHRITIS (HCC): ICD-10-CM

## 2024-07-09 DIAGNOSIS — I10 PRIMARY HYPERTENSION: ICD-10-CM

## 2024-07-09 DIAGNOSIS — R65.10 SIRS (SYSTEMIC INFLAMMATORY RESPONSE SYNDROME) (HCC): Primary | ICD-10-CM

## 2024-07-09 DIAGNOSIS — N18.31 STAGE 3A CHRONIC KIDNEY DISEASE (HCC): ICD-10-CM

## 2024-07-09 DIAGNOSIS — E78.2 MIXED HYPERLIPIDEMIA: ICD-10-CM

## 2024-07-09 PROCEDURE — 99496 TRANSJ CARE MGMT HIGH F2F 7D: CPT

## 2024-07-09 NOTE — ASSESSMENT & PLAN NOTE
Antihypertensive has been stopped.  Patient was advised to monitor blood pressure closely at home.  If blood pressure consistently staying above 140/90 get back to us otherwise we will reevaluate at next office visit

## 2024-07-09 NOTE — PROGRESS NOTES
Transition of Care Visit  Name: Ayla Nye      : 1950      MRN: [id number]  Encounter Provider: Rafy Angelo MD  Encounter Date: 2024   Encounter department: [facility] PR[facility]    Assessment & Plan   1. SIRS (systemic inflammatory response syndrome) (HCC)  Assessment & Plan:  Resolved  2. Mixed hyperlipidemia  Assessment & Plan:  Under control with diet and exercise.  We will continue to monitor  3. Psoriatic arthritis (HCC)  Assessment & Plan:  Under control.    Continue current medication.    We will re-evaluate at next office visit.  Patient has been followed by a dermatologist and rheumatologist  4. Primary hypertension  Assessment & Plan:  Antihypertensive has been stopped.  Patient was advised to monitor blood pressure closely at home.  If blood pressure consistently staying above 140/90 get back to us otherwise we will reevaluate at next office visit  5. Stage 3a chronic kidney disease (HCC)  Assessment & Plan:  Lab Results   Component Value Date    EGFR 61 2024    EGFR 64 2024    EGFR 68 2024    CREATININE 0.93 2024    CREATININE 0.89 2024    CREATININE 0.85 2024   creatinine and GFR stable   Will need periodic BMP  Avoid NSAIDs like ibuprofen Aleve Advil etc.  Avoid high potassium diet.  We will continue to monitor            History of Present Illness     Transitional Care Management Review:   Ayla Nye is a 73 y.o. female here for TCM follow up.     During the TCM phone call patient stated:  TCM Call     Date and time call was made  2024 11:31 AM    Hospital care reviewed  Records reviewed    Patient was hospitialized at  [facility]    Date of Admission  24    Date of discharge  24    Diagnosis  SIRS (systemic inflammatory response syndrome)     Disposition  Home    Current Symptoms  None      TCM Call     Post hospital issues  None    Should patient be enrolled in anticoag monitoring?  No     Scheduled for follow up?  Yes    Did you obtain your prescribed medications  Yes    Do you need help managing your prescriptions or medications  No    Is transportation to your appointment needed  No    I have advised the patient to call PCP with any new or worsening symptoms  Marta Vasquez MA        Patient here for transitional care management patient was admitted to [facility] July 2, 2024 and discharged on July 4, 2024 at [facility] with a diagnosis of systemic inflammatory response syndrome.  Patient had a fever but no sign of infection and patient was observed off antibiotic patient had a fever with chemotherapy both times.  All the records from the hospital reviewed.  Patient currently feeling well.  Denies any chest pain, shortness of breath, cough, fever, chills, lightheadedness or dizziness.  Patient denies any abdominal pain, nausea, vomiting, bowel or bladder problem.  No tingling numbness or weakness.  No headache.  No other complaints.      Review of Systems   Constitutional:  Positive for fatigue. Negative for chills and fever.   HENT:  Negative for congestion, ear pain, rhinorrhea, sneezing and sore throat.    Eyes:  Negative for redness, itching and visual disturbance.   Respiratory:  Negative for cough, chest tightness and shortness of breath.    Cardiovascular:  Negative for chest pain, palpitations and leg swelling.   Gastrointestinal:  Negative for abdominal pain, blood in stool, diarrhea, nausea and vomiting.   Endocrine: Negative for cold intolerance and heat intolerance.   Genitourinary:  Negative for dysuria, frequency and urgency.   Musculoskeletal:  Negative for arthralgias, back pain and myalgias.   Skin:  Negative for color change and rash.   Neurological:  Negative for dizziness, weakness, light-headedness, numbness and headaches.   Hematological:  Does not bruise/bleed easily.   Psychiatric/Behavioral:  Negative for agitation, behavioral problems  "and confusion.      Objective     /78 (BP Location: Right arm, Patient Position: Sitting, Cuff Size: Standard)   Pulse 82   Temp (!) 96.6 °F (35.9 °C) (Tympanic)   Resp 16   Ht 5' 2\" (1.575 m)   Wt 47.2 kg (104 lb)   SpO2 98%   BMI 19.02 kg/m²     Physical Exam  Vitals and nursing note reviewed.   Constitutional:       General: She is not in acute distress.     Appearance: Normal appearance. She is well-developed. She is not ill-appearing, toxic-appearing or diaphoretic.   HENT:      Head: Normocephalic and atraumatic.      Nose: Nose normal.      Mouth/Throat:      Mouth: Mucous membranes are moist.      Pharynx: Oropharynx is clear. No posterior oropharyngeal erythema.   Eyes:      General: No scleral icterus.        Right eye: No discharge.         Left eye: No discharge.      Extraocular Movements: Extraocular movements intact.      Conjunctiva/sclera: Conjunctivae normal.      Pupils: Pupils are equal, round, and reactive to light.   Cardiovascular:      Rate and Rhythm: Normal rate and regular rhythm.      Pulses: Normal pulses.      Heart sounds: Normal heart sounds. No murmur heard.     No gallop.   Pulmonary:      Effort: Pulmonary effort is normal. No respiratory distress.      Breath sounds: Normal breath sounds. No wheezing or rales.   Abdominal:      General: Abdomen is flat. Bowel sounds are normal. There is no distension.      Palpations: Abdomen is soft.      Tenderness: There is no abdominal tenderness. There is no right CVA tenderness, left CVA tenderness or guarding.   Musculoskeletal:         General: No swelling or tenderness. Normal range of motion.      Cervical back: Normal range of motion and neck supple. No rigidity.      Right lower leg: No edema.      Left lower leg: No edema.   Lymphadenopathy:      Cervical: No cervical adenopathy.   Skin:     General: Skin is warm and dry.      Capillary Refill: Capillary refill takes 2 to 3 seconds.      Coloration: Skin is not jaundiced " or pale.      Findings: No bruising.   Neurological:      General: No focal deficit present.      Mental Status: She is alert and oriented to person, place, and time. Mental status is at baseline.      Sensory: No sensory deficit.      Gait: Gait normal.   Psychiatric:         Mood and Affect: Mood normal.         Behavior: Behavior normal.       Medications have been reviewed by provider in current encounter    Administrative Statements

## 2024-07-09 NOTE — ASSESSMENT & PLAN NOTE
Lab Results   Component Value Date    EGFR 61 07/03/2024    EGFR 64 07/02/2024    EGFR 68 07/01/2024    CREATININE 0.93 07/03/2024    CREATININE 0.89 07/02/2024    CREATININE 0.85 07/01/2024   creatinine and GFR stable   Will need periodic BMP  Avoid NSAIDs like ibuprofen Aleve Advil etc.  Avoid high potassium diet.  We will continue to monitor

## 2024-07-12 NOTE — PROGRESS NOTES
HEMATOLOGY / ONCOLOGY CLINIC FOLLOW UP NOTE    Patient Ayla Nye  MRN: [id number]  : 1950  Date of Encounter 7/15/2024          Reason for Encounter: follow up ET and new lung mass on CRT C3 of 6 (2024)      C1 2024  C2  2024  C3 2024  C4 2024  C5   2024  C6-postponed due to hospitalization/neutropenic fever without source      C6  given 2024  Admitted NF after treatment    Labs pending today                Oncology History   Malignant neoplasm of upper lobe, right bronchus or lung (HCC)    Initial Diagnosis    Primary lung adenocarcinoma, right (HCC)     3/26/2024 Biopsy    A-C. Lymph Node, Level 4R :    - Metastatic non-small cell carcinoma, most compatible with a primary lung adenocarcinoma; see note.       D-F. Lymph Node, Level 10R:    - Metastatic non-small cell carcinoma; see note.       G-I. Lymph Node, Level 4L:    - Metastatic non-small cell carcinoma; see note.       4/15/2024 -  Cancer Staged    Staging form: Lung, AJCC 8th Edition  - Clinical stage from 4/15/2024: Stage IIIB (cT2b, cN3, cM0) - Signed by Rogelio Beebe DO on 4/15/2024       2024 -  Chemotherapy    alteplase (CATHFLO), 2 mg, Intracatheter, Every 1 Minute as needed, 6 of 6 cycles  CARBOplatin (PARAPLATIN) IVPB (GOG AUC DOSING), 130.4 mg, Intravenous, Once, 6 of 6 cycles  Administration: 130.4 mg (2024), 144.8 mg (2024), 138.4 mg (2024), 144.8 mg (2024), 132 mg (2024)  PACLItaxel (TAXOL) chemo IVPB, 45 mg/m2 = 67.2 mg (90 % of original dose 50 mg/m2), Intravenous, Once, 6 of 6 cycles  Dose modification: 45 mg/m2 (original dose 50 mg/m2, Cycle 1, Reason: Anticipated Tolerance)  Administration: 67.2 mg (2024), 67.2 mg (2024), 67.2 mg (2024), 67.2 mg (2024), 67.2 mg (2024)       See Fellow not 7/15/2024       Discussion      cT2b N3 M0 Stage IIIB adenocarcinoma lung        Caris     Kras C12V, p53  PDL1 TPS 5%  TMB 16 mut/Mb    Other  mutations negative            The optimal therapy of resectable Stage III NSCLC consists of systemic treatment combined with local approach with radiation and/or surgery.  Strategies include surgery followed by adjuvant chemotherapy  neoadjuvant chemotherapy or chemoimmunotherapy followed by surgery, neoadjuvant chemoradiotherapy followed by surgery or concurrent or sequential chemotherapy with definitive radiation therapy.      The accepted standard remains concurrent chemoradiotherapy although this is being challenged     The potential benefit of adding surgery to combined chemoradiotherapy for stage IIIA disease was noted in the Intergroup 0139 trial.  T1-3N2 NSCLC were assigned to concurrent chemorads 45 Gy with two cycles of Cisplatin/Etoposide followed by either surgery of continued chemorads.  PFS was longer in the surgery arm with no OS difference.      The two chemotherapy regimens that have been most commonly used in the [place] are the combination of cisplatin and etoposide and weekly carboplatin and paclitaxel:     Concurrent cisplatin (50 mg/m2 on days 1, 8, 29, and 36) plus etoposide (50 mg/m2 daily on days 1 to 5, and 29 to 33) with thoracic RT followed by two cycles of cisplatin plus etoposide was evaluated in a multicenter phase II trial of 50 patients with pathologically confirmed stage IIIB disease  With an average follow-up of 52 months, the three- and five-year survival rates were 17 and 15 percent, respectively. Treatment was complicated by grade 4 neutropenia in 32 percent and grade 3 or 4 esophagitis in 20 percent of patients. In a subsequent phase II trial, better results were seen with the same concurrent chemoradiation regimen followed by docetaxel consolidation, but the use of consolidation chemotherapy in this setting has been called into question by subsequent data       In a randomized phase II trial, carboplatin (area under the curve [AUC] = 2) and paclitaxel (45  "mg/m2) given weekly with radiotherapy (63 Gy) followed by two cycles of consolidation therapy (carboplatin AUC = 6, paclitaxel 200 mg/m2) resulted in a median survival of 16.3 months.         Thus, for chemoradiation, the choices are cisplatin plus etoposide, in conjunction with once daily RT to a total of 60 Gy. An alternative \"radiosensitizing chemotherapy\" approach uses weekly carboplatin plus paclitaxel with approximately 60 Gy of radiation, followed by two cycles of consolidation with this same chemotherapy combination at standard systemic doses. The combination of pemetrexed and platinum agents has emerged as another acceptable alternative for stage III patients with nonsquamous cell histology.      Based on preclinical evidence demonstrating synergy between RT and IO, the Phase III [facility] trial used a PDL1 inhibitor Durvalumab after concurrent CRT in a randomized study involving 713 patients.  After median follow up of 34 months giving drug every 2 weeks for 12 months as consolidation, the use of durvalumab significantly improved PFR from 5.6 to 16.9 months and led to a significant improvement in OS with HR 0.72.  The incidence of new mets was also lower including brain mets.      Grade 3 or 4 adverse events occurred in 31 percent of the patients who received durvalumab and 26 percent of those who received placebo, with the most common severe adverse event being pneumonia. Adverse events of any grade that were of special interest, regardless of cause, were reported in 66 percent of patients receiving durvalumab versus 49 percent of those receiving placebo, most of which were grade 1 or 2. Such events for durvalumab versus placebo included diarrhea (18 versus 19 percent), pneumonitis (13 versus 8 percent), rash (12 versus 7 percent), and pruritus (12 versus 5 percent). Adverse events of special interest for which patients received concomitant treatment were reported in 42 versus 17 of patients receiving " durvalumab and placebo, respectively, with treatments typically including steroids, endocrine agents, and occasionally other immunosuppressive agents. Patient-reported quality of life was comparable between the two groups      These results demonstrate efficacy and tolerability of durvalumab for treatment of unresectable stage III NSCLC in patients who experience an objective response or stable disease following completion of chemoradiotherapy.     For this patient weekly Carboplatin AUC 2 and Taxol 45 mg/m2 weekly x 6 weeks with RT to 60-63 Gy will be used.  IO therapy, based on response, will be considered per the [facility] trial as above        Assessment/Plan:     Ms. 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  She has been tolerating Hydrea 500 mg once daily Monday through Friday and off on Saturday and Sunday. Patient also with 4.1 cm RUL mass 1.5cm spiculated posterior nodule mediastinal adenopathy, no metastatic disease including brain MRI new diagnosis      ET:      hydroxyurea (HYDREA) 500 mg capsule; continue daily 500 mg 7 days per week; once treatment starts will hold-has been held since 5/23/2024     Plts 498     Plts off hydrea 388 (were 498 then 434)    Plts 283     NSCLC/adenocarcinoma;mutational analysis pending      Now with RUL 4.2 cm mass with mediastinal adenopathy at least cT2a cN2 cM0 lung cancer     cT2b N3 Stage IIIB lung      Please see above regarding discussion      Patient to get C5/6 on the 20 th June 2024     Carboplatin AUC 2 Taxol 45 mg/m2 weekly with RT     Durvalumab adjuvant post treatment depending on response      Doing well, minimal numbness in feet/esophagitis      7/1/2024     Was admitted 6/22-6/24/2024 for neutropenic fever     WBC 2.5 ANC 1810 Hgb 9.0 plts 240 (hx ET/off hydrea)     Continue with C6 7/2/2024; last radiation 7/5/2024      7/15/2024    Had recent admission NF    Doing well; completed  CRT 7/5/2024    Follow labs-repeat today WBC 2.91 ANC 2080     Follow up early Sept-PET prior in late August 2024      IgG deficiency     IgG mildly low at 571.  This is not significantly decreased enough to cause the recurrent infections.  Will continue to monitor on subsequent labs.  No need for intervention at this time.     - Comprehensive metabolic panel; Future  - IgG, IgA, IgM; Future      Follow up     1st week Sept 2024    Monitor labs due to ET                   History of present illness:  Initial Visit 4/10/2024     Ayla Nye is a 73-year-old female with past medical history of hypertension, psoriatic arthritis, CKD stage III, hyperlipidemia, and latent TB.  She is seen in follow-up for essential thrombocytosis.  She has had an elevated platelet count dating back to January 2028 all of her other cell lines were within normal limits.  Her highest platelet count was around 700,000.  Myeloproliferative work-up demonstrated positive JAK2 mutation and she was started on 500 mg of Hydrea daily in July 2020.  Due to leukopenia her dose was reduced to Monday, Wednesday, Friday.  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  Then again in July we had to decrease her dose and she is taking 500 mg once daily Monday through Friday and not taking any on Saturday or Sunday.     She has been tolerating the 500 mg of Hydrea Monday through Friday off Saturday and Sunday well without any side effects.  She was seen by my attending and underwent further workup of her recurrent infections and was found to have a mildly low IgG level.   She has had pneumonia twice once in October and then again in February.  She states that she has had multiple imaging of her chest which does suggest scarring which prompted a CT scan of her chest .  .   She is also had recurrent sinus infections.  She is a former smoker and quit 15 years ago bit was 1[[d x more than 30 years.        She does not have any continued symptoms of pneumonia and no cough, shortness of breath, or fevers.       She underwent the following testing; EBUS with pulmonary as well as CT chest/PET scan     Lung mass on CT chest 2/28/2024     LUNGS:     -4.1 x 3.4 cm solid mass in the anterior right upper lobe (series 302, image 73).     -Posterior to this mass, there is a 1.5 x 1.5 cm spiculated nodule (series 302, image 78)     -2.5 x 2.1 cm groundglass nodule in the anterior left upper lobe (series 302, image 90)     Mild emphysema.     Right apical pleural-parenchymal scarring.     PLEURA: Small calcified left posterior pleural plaque, which may be from prior asbestos exposure or the sequela of old inflammation.     HEART/GREAT VESSELS: Normal heart size. Mild-moderate coronary artery calcification. Mild to moderate mitral annular calcification. Trace pericardial effusion. No thoracic aortic aneurysm.     MEDIASTINUM AND SUDEEP: 1.6 x 2.0 cm right lower paratracheal/precarinal lymph node. 1.3 x 1.3 cm right lower paratracheal lymph node.        CT PET  3/28/2024     Intensely FDG avid right upper lobe mass, SUV max of 18. This abuts the anterior pleural margin. Central photopenia suggest necrosis. Mass measures on the order of 4.2 cm in size, stable previously 4.1 cm.     Subjacent 1.5 cm spiculated nodule posteriorly demonstrates minimal uptake, SUV max of 1.7.     Minimal FDG uptake in the left upper lobe groundglass nodule, SUV max of 2.2. This measured up to 2.5 cm in size on recent CT, appears grossly stable on low-dose CT.     A few FDG-avid mediastinal lymph nodes. Right anterior paratracheal lymph node demonstrates SUV max of 13. This measures up to 1.9 cm short axis image 85 series 3, may be slightly larger previously 1.6 cm.     A right perihilar lymph node demonstrates SUV max of 12.  This measures on the order of 1.2 cm short axis image 89 series 3.  CT images: Scattered emphysematous changes of the lung fields. Mild to moderate coronary artery calcifications. Minimal left pleural calcification. Small calcified lymph nodes in the left perihilar region        3/26/2024     -C. Lymph Node, Level 4R (ThinPrep, smears and cell block preparations):    - Metastatic non-small cell carcinoma, most compatible with a primary lung adenocarcinoma; see note.    - Satisfactory for evaluation.     D-F. Lymph Node, Level 10R (ThinPrep, smears and cell block preparations):    - Metastatic non-small cell carcinoma; see note.    - Satisfactory for evaluation.     G-I. Lymph Node, Level 4L (ThinPrep, smears and cell block preparations):    - Metastatic non-small cell carcinoma; see note.    - Satisfactory for evaluation.               Interval History:  6/3/2024     Ms Nye is tolerating treatment fairly well but did have a reaction to Taxol at C2.  She has flushing and chest pain; drug was stopped she was given Benadryl/steroids/fluids and started at slower rate.  She has not had issues with N/V but with constipation.  Her joints are more painful.  She is eating but taste is an issue, probably due to Carboplatin.  She denies any GERD but has increased cough which is from CRT.  She has no SOB.  We discussed Mucinex to help cut the phlegm generated from treatment.  Her weight is 110 pounds which is slightly decreased; her BP is 118/64. She has no other issues          Interval History:  6/17/2024     Doing well; in week 5.  Has esophagitis and carafate slurry  was prescribed; she has not started it.  No N/V, has minimal numbness mostly feet.  Had issue with veins and extravasation fluid left lower arm.  Some reddness, healing, no infection.  Labs ok plts 388 WBC 5.9 Hgb 9.4.  States psoriasis is better.  Her weight is decreased to 104 but she is eating but less.       Interval History:  7/1/2024 6/22/2024  admitted due to neutropenic  fever, rigors.  She also endorsed fatigue which was post chemo.  In the ED, she was found to be fulfilling the SIRS criteria given her low white count, she was empirically started on broad-spectrum antibiotic.  On the following day antibiotics were stopped as she continued to improve.  During her hospitalization, she also developed diarrhea which was to be noninfective in origin.  She also received her scheduled radiation therapy  Discharged 6/24/2024; chemotherapy was held for neutropenia     She is doing better today; very anxious to stop treatment this week.  Feeling improved from admission.      Labs improved as above WBC 2.5 ANC 1810 plts 240 Na 141 k 4/1 Cr 0.85 ALT/AST 8/10         Interval History:   7/15/2024    2 weeks post last CRT; doing well; decreased neuropathy in feet.  Was admitted for fever, no source. However CT CAP done and due to inflammation, was read as worse.  She is aware much of this is inflammation and reason this is not repeated during treatment.  Overall she is doing well 2 weeks post therapy.    Plts today 283, were 166; WBC 2.91 Hgb 9.5 ANC 2080    4.6 x 4.8 x 3.3 cm mass within the anteromedial aspect of the right upper lobe of the lung. This mass abuts the anterior and anteromedial pleural surface and the right hand aspect of the upper mediastinum. The mass   demonstrates peripheral spiculation and areas of low density centrally, suggesting areas of necrosis. Just posterolateral to this mass there is a spiculated satellite lesion measuring approximately 1.6 cm. There is also a spiculated satellite lesion   anterior and inferior to the dominant mass, measuring approximately 1.4 cm and abutting the anterior pleural surface (series 302 image 91); this satellite mass appears new compared with February 28, 2024. An ill-defined groundglass opacity in the left   upper lobe of the lung measures approximately 2.5 cm, similar to the prior study.    PET in march  2024     FDG avid right upper lobe mass, SUV max of 18. This abuts the anterior pleural margin. Central photopenia suggest necrosis. Mass measures on the order of 4.2 cm in size, stable previously 4.1 cm.     Subjacent 1.5 cm spiculated nodule posteriorly demonstrates minimal uptake, SUV max of 1.7.     Minimal FDG uptake in the left upper lobe groundglass nodule, SUV max of 2.2. This measured up to 2.5 cm in size on recent CT, appears grossly stable on low-dose CT.       REVIEW OF SYSTEMS:    As above    Please note that a 14-point review of systems was performed to include Constitutional, HEENT, Respiratory, CVS, GI, , Musculoskeletal, Integumentary, Neurologic, Rheumatologic, Endocrinologic, Psychiatric, Lymphatic, and Hematologic/Oncologic systems were reviewed and are negative unless otherwise stated in HPI. Positive and negative findings pertinent to this evaluation are incorporated into the history of present illness.      ECOG PS: 0    PROBLEM LIST:  Patient Active Problem List   Diagnosis    TB lung, latent    Psoriatic arthritis (HCC)    Essential thrombocytosis (HCC)    Hypertension    Mixed hyperlipidemia    Encounter for monitoring of hydroxyurea therapy    JAK2 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    Age-related osteoporosis without current pathological fracture    CKD (chronic kidney disease) stage 3, GFR 30-59 ml/min (HCC)    Malignant neoplasm of upper lobe, right bronchus or lung (HCC)    Bilateral leg pain    Insomnia    Moderate protein-calorie malnutrition (HCC)    Dehydration    SIRS (systemic inflammatory response syndrome) (HCC)       Past Medical History:   has a past medical history of Allergic (2022), Cataract (Nov. 2023), Essential thrombocytosis (HCC), Hyperlipidemia, Hypertension, Mass in chest, Nodular goiter, Pneumonia (Oct 16, 2023), Psoriasis, and SIRS (systemic inflammatory response syndrome) (HCC) (06/22/2024).    PAST SURGICAL HISTORY:   has a past surgical history that includes Appendectomy; Mammo  needle localization right (all inc) (Right, 07/13/2009); Skin lesion excision; Colonoscopy (10/31/2018); Mammo (historical); DXA procedure(historical) (04/21/2017); and Breast cyst excision (Right, 2009).    CURRENT MEDICATIONS  Current Outpatient Medications   Medication Sig Dispense Refill    aspirin 81 MG tablet Take 81 mg by mouth daily       Cholecalciferol (VITAMIN D3) 5000 units TABS 5,000 Units Daily       diphenhydrAMINE (BENADRYL) 25 mg capsule Take 25 mg by mouth every 12 (twelve) hours as needed for itching       Probiotic Product (PROBIOTIC DAILY PO) Take 1 capsule by mouth daily      vitamin B-12 (VITAMIN B-12) 1,000 mcg tablet Take 1 tablet (1,000 mcg total) by mouth daily 90 tablet 1     No current facility-administered medications for this visit.     [unfilled]    SOCIAL HISTORY:   reports that she has quit smoking. Her smoking use included cigarettes. She started smoking about 58 years ago. She has a 58.5 pack-year smoking history. She has been exposed to tobacco smoke. She has never used smokeless tobacco. She reports that she does not currently use alcohol. She reports that she does not use drugs.     FAMILY HISTORY:  family history includes Cancer in her brother and brother; Coronary artery disease in her brother; Depression in her mother; Hearing loss in her mother; Hypertension in her brother and mother; No Known Problems in her daughter, daughter, maternal aunt, maternal aunt, maternal aunt, maternal aunt, maternal grandfather, maternal grandmother, paternal aunt, paternal grandfather, and paternal grandmother; Stroke in her mother; Tuberculosis in her brother and father.     ALLERGIES:  is allergic to doxycycline, keflex [cephalexin], and neomycin-polymyxin-dexameth.      Physical Exam:  Vital Signs:   Visit Vitals  OB Status Postmenopausal   Smoking Status Former     There is no height or weight on file to calculate BMI.  There is no height or weight on file to calculate BSA.    GEN: Alert,  awake oriented x3, in no acute distress  HEENT- No pallor, icterus, cyanosis, no oral mucosal lesions,   LAD - no palpable cervical, clavicle, axillary, inguinal LAD  Heart- normal S1 S2, regular rate and rhythm, No murmur, rubs.   Lungs- clear breathing sound bilateral.   Abdomen- soft, Non tender, bowel sounds present  Extremities- No cyanosis, clubbing, edema  Neuro- No focal neurological deficit    Labs:  Lab Results   Component Value Date    WBC 1.48 (L) 07/04/2024    HGB 8.0 (L) 07/04/2024    HCT 25.6 (L) 07/04/2024    MCV 99 (H) 07/04/2024     07/04/2024     Lab Results   Component Value Date    SODIUM 135 07/03/2024    K 4.1 07/03/2024     07/03/2024    CO2 24 07/03/2024    AGAP 7 07/03/2024    BUN 11 07/03/2024    CREATININE 0.93 07/03/2024    GLUC 83 07/03/2024    GLUF 85 07/01/2024    CALCIUM 8.2 (L) 07/03/2024    AST 11 (L) 07/03/2024    ALT 7 07/03/2024    ALKPHOS 55 07/03/2024    TP 5.6 (L) 07/03/2024    TBILI 0.45 07/03/2024    EGFR 61 07/03/2024           I spent 30 minutes on chart review, face to face counseling time, coordination of care and documentation.    Rosalinda Castro MD PhD

## 2024-07-15 ENCOUNTER — OFFICE VISIT (OUTPATIENT)
Dept: HEMATOLOGY ONCOLOGY | Facility: CLINIC | Age: 74
End: 2024-07-15
Payer: MEDICARE

## 2024-07-15 ENCOUNTER — TELEPHONE (OUTPATIENT)
Dept: HEMATOLOGY ONCOLOGY | Facility: CLINIC | Age: 74
End: 2024-07-15

## 2024-07-15 ENCOUNTER — APPOINTMENT (OUTPATIENT)
Dept: LAB | Facility: CLINIC | Age: 74
End: 2024-07-15
Payer: MEDICARE

## 2024-07-15 VITALS
WEIGHT: 105 LBS | SYSTOLIC BLOOD PRESSURE: 110 MMHG | DIASTOLIC BLOOD PRESSURE: 80 MMHG | HEIGHT: 62 IN | HEART RATE: 84 BPM | TEMPERATURE: 97.9 F | OXYGEN SATURATION: 99 % | BODY MASS INDEX: 19.32 KG/M2

## 2024-07-15 DIAGNOSIS — C76.1 MALIGNANT NEOPLASM OF THORAX (HCC): ICD-10-CM

## 2024-07-15 DIAGNOSIS — C34.91 NSCLC OF RIGHT LUNG (HCC): ICD-10-CM

## 2024-07-15 DIAGNOSIS — C34.91 NSCLC OF RIGHT LUNG (HCC): Primary | ICD-10-CM

## 2024-07-15 LAB
ALBUMIN SERPL BCG-MCNC: 3.9 G/DL (ref 3.5–5)
ALP SERPL-CCNC: 61 U/L (ref 34–104)
ALT SERPL W P-5'-P-CCNC: 6 U/L (ref 7–52)
ANION GAP SERPL CALCULATED.3IONS-SCNC: 6 MMOL/L (ref 4–13)
AST SERPL W P-5'-P-CCNC: 11 U/L (ref 13–39)
BASOPHILS # BLD AUTO: 0.02 THOUSANDS/ÂΜL (ref 0–0.1)
BASOPHILS NFR BLD AUTO: 1 % (ref 0–1)
BILIRUB SERPL-MCNC: 0.31 MG/DL (ref 0.2–1)
BUN SERPL-MCNC: 18 MG/DL (ref 5–25)
CALCIUM SERPL-MCNC: 9 MG/DL (ref 8.4–10.2)
CHLORIDE SERPL-SCNC: 106 MMOL/L (ref 96–108)
CO2 SERPL-SCNC: 27 MMOL/L (ref 21–32)
CREAT SERPL-MCNC: 0.92 MG/DL (ref 0.6–1.3)
EOSINOPHIL # BLD AUTO: 0.04 THOUSAND/ÂΜL (ref 0–0.61)
EOSINOPHIL NFR BLD AUTO: 1 % (ref 0–6)
ERYTHROCYTE [DISTWIDTH] IN BLOOD BY AUTOMATED COUNT: 16.4 % (ref 11.6–15.1)
GFR SERPL CREATININE-BSD FRML MDRD: 61 ML/MIN/1.73SQ M
GLUCOSE P FAST SERPL-MCNC: 80 MG/DL (ref 65–99)
HCT VFR BLD AUTO: 30.9 % (ref 34.8–46.1)
HGB BLD-MCNC: 9.5 G/DL (ref 11.5–15.4)
IMM GRANULOCYTES # BLD AUTO: 0.02 THOUSAND/UL (ref 0–0.2)
IMM GRANULOCYTES NFR BLD AUTO: 1 % (ref 0–2)
LDH SERPL-CCNC: 160 U/L (ref 140–271)
LYMPHOCYTES # BLD AUTO: 0.28 THOUSANDS/ÂΜL (ref 0.6–4.47)
LYMPHOCYTES NFR BLD AUTO: 10 % (ref 14–44)
MAGNESIUM SERPL-MCNC: 2.2 MG/DL (ref 1.9–2.7)
MCH RBC QN AUTO: 31.3 PG (ref 26.8–34.3)
MCHC RBC AUTO-ENTMCNC: 30.7 G/DL (ref 31.4–37.4)
MCV RBC AUTO: 102 FL (ref 82–98)
MONOCYTES # BLD AUTO: 0.47 THOUSAND/ÂΜL (ref 0.17–1.22)
MONOCYTES NFR BLD AUTO: 16 % (ref 4–12)
NEUTROPHILS # BLD AUTO: 2.08 THOUSANDS/ÂΜL (ref 1.85–7.62)
NEUTS SEG NFR BLD AUTO: 71 % (ref 43–75)
NRBC BLD AUTO-RTO: 0 /100 WBCS
PLATELET # BLD AUTO: 283 THOUSANDS/UL (ref 149–390)
PMV BLD AUTO: 8.8 FL (ref 8.9–12.7)
POTASSIUM SERPL-SCNC: 4.5 MMOL/L (ref 3.5–5.3)
PROT SERPL-MCNC: 6.5 G/DL (ref 6.4–8.4)
RBC # BLD AUTO: 3.04 MILLION/UL (ref 3.81–5.12)
SODIUM SERPL-SCNC: 139 MMOL/L (ref 135–147)
WBC # BLD AUTO: 2.91 THOUSAND/UL (ref 4.31–10.16)

## 2024-07-15 PROCEDURE — 99214 OFFICE O/P EST MOD 30 MIN: CPT | Performed by: INTERNAL MEDICINE

## 2024-07-15 PROCEDURE — 85025 COMPLETE CBC W/AUTO DIFF WBC: CPT

## 2024-07-15 PROCEDURE — 36415 COLL VENOUS BLD VENIPUNCTURE: CPT

## 2024-07-15 PROCEDURE — 83615 LACTATE (LD) (LDH) ENZYME: CPT

## 2024-07-15 PROCEDURE — 83735 ASSAY OF MAGNESIUM: CPT

## 2024-07-15 PROCEDURE — 80053 COMPREHEN METABOLIC PANEL: CPT

## 2024-07-15 NOTE — TELEPHONE ENCOUNTER
Pt called to follow up on the VM left by Shahrzad COHEN. Pt wanted to know if it was ok to continue with Otezla 30 mg . She reported that another prescribed that medication but she wasn't taking it due to needing blood work and wants to know if Dr. Castro is ok with her restarting this medication. In regards to the hydrea , she is aware to continue to hold medication and get labs done again in 2 weeks.

## 2024-07-15 NOTE — TELEPHONE ENCOUNTER
Left msg for patient to call back.  Call placed to review [facility] results from today   Per Dr. Castro results are good.  She can continue to hold hydrea and repeat labs in 2 weeks

## 2024-07-15 NOTE — TELEPHONE ENCOUNTER
Patient aware that per Dr. Castro she is ok to resume her Otezla  She will repeat labs in 2 weeks  Hydrea remains on hold

## 2024-07-15 NOTE — PROGRESS NOTES
HEMATOLOGY / ONCOLOGY CLINIC FOLLOW UP NOTE    Patient Ayla Nye  MRN: [id number]  : 1950  Date of Encounter 7/15/2024     Reason for Encounter: follow up ET and new lung mass on CRT C6 2024     C1 2024  C2  2024  C3 2024  C4 2024  C5 2024  C6 given 2024 at [facility] f/b hospitalization for neutropenic fever    Oncology History   Malignant neoplasm of upper lobe, right bronchus or lung (HCC)    Initial Diagnosis    Primary lung adenocarcinoma, right (HCC)     3/26/2024 Biopsy    A-C. Lymph Node, Level 4R :    - Metastatic non-small cell carcinoma, most compatible with a primary lung adenocarcinoma; see note.       D-F. Lymph Node, Level 10R:    - Metastatic non-small cell carcinoma; see note.       G-I. Lymph Node, Level 4L:    - Metastatic non-small cell carcinoma; see note.       4/15/2024 -  Cancer Staged    Staging form: Lung, AJCC 8th Edition  - Clinical stage from 4/15/2024: Stage IIIB (cT2b, cN3, cM0) - Signed by Rogelio Beebe DO on 4/15/2024       2024 -  Chemotherapy    alteplase (CATHFLO), 2 mg, Intracatheter, Every 1 Minute as needed, 6 of 6 cycles  CARBOplatin (PARAPLATIN) IVPB (GOG AUC DOSING), 130.4 mg, Intravenous, Once, 6 of 6 cycles  Administration: 130.4 mg (2024), 144.8 mg (2024), 138.4 mg (2024), 144.8 mg (2024), 132 mg (2024)  PACLItaxel (TAXOL) chemo IVPB, 45 mg/m2 = 67.2 mg (90 % of original dose 50 mg/m2), Intravenous, Once, 6 of 6 cycles  Dose modification: 45 mg/m2 (original dose 50 mg/m2, Cycle 1, Reason: Anticipated Tolerance)  Administration: 67.2 mg (2024), 67.2 mg (2024), 67.2 mg (2024), 67.2 mg (2024), 67.2 mg (2024)          Discussion      cT2b N3 M0 Stage IIIB adenocarcinoma lung     Caris  Kras C12V, p53  PDL1 TPS 5%  TMB 16 mut/Mb   Other  mutations negative      The optimal therapy of resectable Stage III NSCLC consists of systemic treatment combined with  local approach with radiation and/or surgery.  Strategies include surgery followed by adjuvant chemotherapy  neoadjuvant chemotherapy or chemoimmunotherapy followed by surgery, neoadjuvant chemoradiotherapy followed by surgery or concurrent or sequential chemotherapy with definitive radiation therapy.      The accepted standard remains concurrent chemoradiotherapy although this is being challenged     The potential benefit of adding surgery to combined chemoradiotherapy for stage IIIA disease was noted in the Intergroup 0139 trial.  T1-3N2 NSCLC were assigned to concurrent chemorads 45 Gy with two cycles of Cisplatin/Etoposide followed by either surgery of continued chemorads.  PFS was longer in the surgery arm with no OS difference.      The two chemotherapy regimens that have been most commonly used in the [place] are the combination of cisplatin and etoposide and weekly carboplatin and paclitaxel:     Concurrent cisplatin (50 mg/m2 on days 1, 8, 29, and 36) plus etoposide (50 mg/m2 daily on days 1 to 5, and 29 to 33) with thoracic RT followed by two cycles of cisplatin plus etoposide was evaluated in a multicenter phase II trial of 50 patients with pathologically confirmed stage IIIB disease  With an average follow-up of 52 months, the three- and five-year survival rates were 17 and 15 percent, respectively. Treatment was complicated by grade 4 neutropenia in 32 percent and grade 3 or 4 esophagitis in 20 percent of patients. In a subsequent phase II trial, better results were seen with the same concurrent chemoradiation regimen followed by docetaxel consolidation, but the use of consolidation chemotherapy in this setting has been called into question by subsequent data       In a randomized phase II trial, carboplatin (area under the curve [AUC] = 2) and paclitaxel (45 mg/m2) given weekly with radiotherapy (63 Gy) followed by two cycles of consolidation therapy (carboplatin AUC = 6, paclitaxel 200 mg/m2)  "resulted in a median survival of 16.3 months.      Thus, for chemoradiation, the choices are cisplatin plus etoposide, in conjunction with once daily RT to a total of 60 Gy. An alternative \"radiosensitizing chemotherapy\" approach uses weekly carboplatin plus paclitaxel with approximately 60 Gy of radiation, followed by two cycles of consolidation with this same chemotherapy combination at standard systemic doses. The combination of pemetrexed and platinum agents has emerged as another acceptable alternative for stage III patients with nonsquamous cell histology.      Based on preclinical evidence demonstrating synergy between RT and IO, the Phase III [place] trial used a PDL1 inhibitor Durvalumab after concurrent CRT in a randomized study involving 713 patients.  After median follow up of 34 months giving drug every 2 weeks for 12 months as consolidation, the use of durvalumab significantly improved PFR from 5.6 to 16.9 months and led to a significant improvement in OS with HR 0.72.  The incidence of new mets was also lower including brain mets.      Grade 3 or 4 adverse events occurred in 31 percent of the patients who received durvalumab and 26 percent of those who received placebo, with the most common severe adverse event being pneumonia. Adverse events of any grade that were of special interest, regardless of cause, were reported in 66 percent of patients receiving durvalumab versus 49 percent of those receiving placebo, most of which were grade 1 or 2. Such events for durvalumab versus placebo included diarrhea (18 versus 19 percent), pneumonitis (13 versus 8 percent), rash (12 versus 7 percent), and pruritus (12 versus 5 percent). Adverse events of special interest for which patients received concomitant treatment were reported in 42 versus 17 of patients receiving durvalumab and placebo, respectively, with treatments typically including steroids, endocrine agents, and occasionally other immunosuppressive " agents. Patient-reported quality of life was comparable between the two groups      These results demonstrate efficacy and tolerability of durvalumab for treatment of unresectable stage III NSCLC in patients who experience an objective response or stable disease following completion of chemoradiotherapy.     For this patient weekly Carboplatin AUC 2 and Taxol 45 mg/m2 weekly x 6 weeks with RT to 60-63 Gy will be used.  IO therapy, based on response, will be considered per the [place] trial as above        Assessment/Plan:     Ms. 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  She has been tolerating Hydrea 500 mg once daily Monday through Friday and off on Saturday and Sunday. Patient also with 4.1 cm RUL mass 1.5cm spiculated posterior nodule mediastinal adenopathy, no metastatic disease including brain MRI new diagnosis      ET:      Previously treated with hydroxyurea (HYDREA) 500 mg capsule     Plts 498 > 434 > 388 (holding hydroxyurea since 5/23/2024 2/2 drop in platelets from chemotherapy)    Plts 166 (7/4/2024) s/p C6 chemo, continuing to hold hydroxyurea, recheck labs and will plan to restart once labs show thrombocytosis.     NSCLC/adenocarcinoma; mutational analysis pending      Now with RUL 4.2 cm mass with mediastinal adenopathy at least cT2a cN2 cM0 lung cancer     cT2b N3 Stage IIIB lung      Please see above regarding discussion      Carboplatin AUC 2 Taxol 45 mg/m2 weekly with RT     Durvalumab adjuvant post treatment depending on response     Neuropathy has resolved after last cycle of Taxol.    Esophagitis appearing better after completing radiation.    7/1/2024    Was admitted 6/22-6/24/2024 for neutropenic fever    WBC 2.5 ANC 1810 Hgb 9.0 plts 240 (hx ET/off hydrea)    Continue with C6 7/2/2024; last radiation 7/5/2024      7/15/2024    Completed C6 on 7/2/2024 and last radiation 7/5/2024.  Was admitted for neutropenic fever  "after last cycle of chemo.  Discharged on 7/4/2024 and has been asymptomatic since.  Labs at discharge showed WBC 1.5, ANC 1317 and Plt 166.    Repeat CT PET 8/26/2024, radiology read from CT CAP in ED showed \"progression\" of disease but this study was performed during chemoradiation, so it is confounded by the inability to distinguish disease from inflammation.  Will  treatment response on repeat CT PET.    Recheck labs to confirm neutropenia has resolved.    Hold Otezla for psoriasis until neutropenia has resolved, but would like to restart as soon as safe as psoriasis is rebounding.    IgG deficiency     IgG mildly low at 571.  This is not significantly decreased enough to cause the recurrent infections.  Will continue to monitor on subsequent labs.  No need for intervention at this time.     - Comprehensive metabolic panel; Future  - IgG, IgA, IgM; Future      Follow up     Labs in two weeks, will call with results, in-office appointment after CT PET          History of present illness:  Initial Visit 4/10/2024     Ayla Nye is a 73-year-old female with past medical history of hypertension, psoriatic arthritis, CKD stage III, hyperlipidemia, and latent TB.  She is seen in follow-up for essential thrombocytosis.  She has had an elevated platelet count dating back to January 2028 all of her other cell lines were within normal limits.  Her highest platelet count was around 700,000.  Myeloproliferative work-up demonstrated positive JAK2 mutation and she was started on 500 mg of Hydrea daily in July 2020.  Due to leukopenia her dose was reduced to Monday, Wednesday, Friday.  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  Then again in July we had to decrease her dose and she is taking 500 mg once daily Monday through Friday " and not taking any on Saturday or Sunday.     She has been tolerating the 500 mg of Hydrea Monday through Friday off Saturday and Sunday well without any side effects.  She was seen by my attending and underwent further workup of her recurrent infections and was found to have a mildly low IgG level.   She has had pneumonia twice once in October and then again in February.  She states that she has had multiple imaging of her chest which does suggest scarring which prompted a CT scan of her chest .  .  She is also had recurrent sinus infections.  She is a former smoker and quit 15 years ago bit was 1[[d x more than 30 years.        She does not have any continued symptoms of pneumonia and no cough, shortness of breath, or fevers.       She underwent the following testing; EBUS with pulmonary as well as CT chest/PET scan     Lung mass on CT chest 2/28/2024     LUNGS:     -4.1 x 3.4 cm solid mass in the anterior right upper lobe (series 302, image 73).     -Posterior to this mass, there is a 1.5 x 1.5 cm spiculated nodule (series 302, image 78)     -2.5 x 2.1 cm groundglass nodule in the anterior left upper lobe (series 302, image 90)     Mild emphysema.     Right apical pleural-parenchymal scarring.     PLEURA: Small calcified left posterior pleural plaque, which may be from prior asbestos exposure or the sequela of old inflammation.     HEART/GREAT VESSELS: Normal heart size. Mild-moderate coronary artery calcification. Mild to moderate mitral annular calcification. Trace pericardial effusion. No thoracic aortic aneurysm.     MEDIASTINUM AND SUDEEP: 1.6 x 2.0 cm right lower paratracheal/precarinal lymph node. 1.3 x 1.3 cm right lower paratracheal lymph node.        CT PET  3/28/2024     Intensely FDG avid right upper lobe mass, SUV max of 18. This abuts the anterior pleural margin. Central photopenia suggest necrosis. Mass measures on the order of 4.2 cm in size, stable previously 4.1 cm.     Subjacent 1.5 cm spiculated  nodule posteriorly demonstrates minimal uptake, SUV max of 1.7.     Minimal FDG uptake in the left upper lobe groundglass nodule, SUV max of 2.2. This measured up to 2.5 cm in size on recent CT, appears grossly stable on low-dose CT.     A few FDG-avid mediastinal lymph nodes. Right anterior paratracheal lymph node demonstrates SUV max of 13. This measures up to 1.9 cm short axis image 85 series 3, may be slightly larger previously 1.6 cm.     A right perihilar lymph node demonstrates SUV max of 12. This measures on the order of 1.2 cm short axis image 89 series 3.  CT images: Scattered emphysematous changes of the lung fields. Mild to moderate coronary artery calcifications. Minimal left pleural calcification. Small calcified lymph nodes in the left perihilar region        3/26/2024     -C. Lymph Node, Level 4R (ThinPrep, smears and cell block preparations):    - Metastatic non-small cell carcinoma, most compatible with a primary lung adenocarcinoma; see note.    - Satisfactory for evaluation.     D-F. Lymph Node, Level 10R (ThinPrep, smears and cell block preparations):    - Metastatic non-small cell carcinoma; see note.    - Satisfactory for evaluation.     G-I. Lymph Node, Level 4L (ThinPrep, smears and cell block preparations):    - Metastatic non-small cell carcinoma; see note.    - Satisfactory for evaluation.     7/3/2024    CT CHEST, ABDOMEN AND PELVIS WITH IV CONTRAST     INDICATION: hx of lung cancer, fever, cough. Fever (102.9 degrees Fahrenheit), cough, recently had chemotherapy.     COMPARISON: February 28, 2024     TECHNIQUE: CT examination of the chest, abdomen and pelvis was performed. Multiplanar 2D reformatted images were created from the source data.     This examination, like all CT scans performed in the [facility], was performed utilizing techniques to minimize radiation dose exposure, including the use of iterative reconstruction and automated exposure control. Radiation dose  length   product (DLP) for this visit: 314 mGy-cm     IV Contrast: 100 mL of iohexol (OMNIPAQUE)  Enteric Contrast: Not administered.     FINDINGS:     CHEST     LUNGS: There is an approximately 4.6 x 4.8 x 3.3 cm mass within the anteromedial aspect of the right upper lobe of the lung. This mass abuts the anterior and anteromedial pleural surface and the right hand aspect of the upper mediastinum. The mass   demonstrates peripheral spiculation and areas of low density centrally, suggesting areas of necrosis. Just posterolateral to this mass there is a spiculated satellite lesion measuring approximately 1.6 cm. There is also a spiculated satellite lesion   anterior and inferior to the dominant mass, measuring approximately 1.4 cm and abutting the anterior pleural surface (series 302 image 91); this satellite mass appears new compared with February 28, 2024. An ill-defined groundglass opacity in the left   upper lobe of the lung measures approximately 2.5 cm, similar to the prior study.     There is mild to moderate emphysema. There is mild bibasilar scarring versus atelectasis. Right apical scarring unchanged.     PLEURA: Left pleural calcifications unchanged. No pleural effusions. No pneumothorax.     HEART/GREAT VESSELS: Coronary artery disease. There is atherosclerosis of the thoracic aorta. No thoracic aortic aneurysm.     MEDIASTINUM AND SUDEEP: There is a 2.4 x 2.2 cm right paratracheal node demonstrating low density centrally suggesting necrosis; this is larger compared with February 28, 2024. Additionally, there is a 2.8 x 2 cm precarinal node demonstrating low density   centrally suggesting necrosis, also larger compared to the prior study. Necrotic appearing right hilar adenopathy measuring up to 2 cm.     CHEST WALL AND LOWER NECK: Tiny thyroid nodules, measuring 6 mm or less. Incidental discovery of one or more thyroid nodule(s) measuring less than 1.5 cm and without suspicious features is noted in this  patient who is above 35 years old; according to   guidelines published in the February 2015 white paper on incidental thyroid nodules in the Journal of the American College of Radiology (JACR), no further evaluation is recommended.     ABDOMEN     LIVER/BILIARY TREE: The liver is mildly enlarged, measuring approximately 19 cm craniocaudal dimension. There is mild hepatic steatosis.     GALLBLADDER: No calcified gallstones. No pericholecystic inflammatory change.     SPLEEN: Unremarkable.     PANCREAS: Unremarkable.     ADRENAL GLANDS: Unremarkable.     KIDNEYS/URETERS: Small cysts and subcentimeter hypodensities too small to characterize. There is a 2 mm nonobstructing calculus in the upper pole right kidney. No hydronephrosis bilaterally.     STOMACH AND BOWEL: No bowel obstruction. There is mild colonic diverticulosis without evidence of acute diverticulitis.     APPENDIX: No findings to suggest appendicitis; reportedly, the patient has undergone prior appendectomy.     ABDOMINOPELVIC CAVITY: No ascites. No pneumoperitoneum. No lymphadenopathy.     VESSELS: Atherosclerosis. No abdominal aortic aneurysm.     PELVIS     REPRODUCTIVE ORGANS: Unremarkable for patient's age.     URINARY BLADDER: Unremarkable.     ABDOMINAL WALL/INGUINAL REGIONS: Unremarkable.     BONES: No acute fracture or suspicious osseous lesion. Spinal degenerative changes.        IMPRESSION:     There has been interval progression of neoplastic disease, as described above. Please see discussion.     Mild hepatomegaly. Mild hepatic steatosis.     Mild colonic diverticulosis without evidence of acute diverticulitis. No bowel obstruction.     Atherosclerosis. Coronary artery disease.     Other nonemergent findings as above.        Interval History:  6/3/2024     Ms Nye is tolerating treatment fairly well but did have a reaction to Taxol at C2.  She has flushing and chest pain; drug was stopped she was given Benadryl/steroids/fluids and started  "at slower rate.  She has not had issues with N/V but with constipation.  Her joints are more painful.  She is eating but taste is an issue, probably due to Carboplatin.  She denies any GERD but has increased cough which is from CRT.  She has no SOB.  We discussed Mucinex to help cut the phlegm generated from treatment.  Her weight is 110 pounds which is slightly decreased; her BP is 118/64. She has no other issues          Interval History:  6/17/2024     Doing well; in week 5.  Has esophagitis and carafate slurry  was prescribed; she has not started it.  No N/V, has minimal numbness mostly feet.  Had issue with veins and extravasation fluid left lower arm.  Some reddness, healing, no infection.  Labs ok plts 388 WBC 5.9 Hgb 9.4.  States psoriasis is better.  Her weight is decreased to 104 but she is eating but less.       Interval History:  7/1/2024 6/22/2024 admitted due to neutropenic  fever, rigors.  She also endorsed fatigue which was post chemo.  In the ED, she was found to be fulfilling the SIRS criteria given her low white count, she was empirically started on broad-spectrum antibiotic.  On the following day antibiotics were stopped as she continued to improve.  During her hospitalization, she also developed diarrhea which was to be noninfective in origin.  She also received her scheduled radiation therapy  Discharged 6/24/2024; chemotherapy was held for neutropenia    She is doing better today; very anxious to stop treatment this week.  Feeling improved from admission.     Labs improved as above WBC 2.5 ANC 1810 plts 240 Na 141 k 4/1 Cr 0.85 ALT/AST 8/10     Interval History:  7/15/2024   Completed C6 on 7/2/2024 at [place] and completed radiation on 7/5/2024.  Was admitted at [facility] for neutropenic fever after last cycle of chemo.  Discharged on 7/4/2024 and has been asymptomatic since.  CT CAP showed radiology read of \"progression\" of disease, but this scan was taken during chemoradiation " and cannot distinguish between disease and inflammation.  Labs at discharge showed WBC 1.5, ANC 1317 and Plt 166.  Denied fever/chills, night sweats, adenopathy, or weight loss.  Endorsed worsening of psoriasis.  Neuropathy has resolved after last cycle of chemo.  Continues off hydroxyurea 2/2 normal platelet counts and continues off Otezla for psoriasis.     REVIEW OF SYSTEMS:  Please note that a 14-point review of systems was performed to include Constitutional, HEENT, Respiratory, CVS, GI, , Musculoskeletal, Integumentary, Neurologic, Rheumatologic, Endocrinologic, Psychiatric, Lymphatic, and Hematologic/Oncologic systems were reviewed and are negative unless otherwise stated in HPI. Positive and negative findings pertinent to this evaluation are incorporated into the history of present illness.    ECOG PS: 0    PROBLEM LIST:  Patient Active Problem List   Diagnosis    TB lung, latent    Psoriatic arthritis (HCC)    Essential thrombocytosis (HCC)    Hypertension    Mixed hyperlipidemia    Encounter for monitoring of hydroxyurea therapy    JAK2 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    Age-related osteoporosis without current pathological fracture    CKD (chronic kidney disease) stage 3, GFR 30-59 ml/min (HCC)    Malignant neoplasm of upper lobe, right bronchus or lung (HCC)    Bilateral leg pain    Insomnia    Moderate protein-calorie malnutrition (HCC)    Dehydration    SIRS (systemic inflammatory response syndrome) (HCC)       Past Medical History:   has a past medical history of Allergic (2022), Cataract (Nov. 2023), Essential thrombocytosis (HCC), Hyperlipidemia, Hypertension, Mass in chest, Nodular goiter, Pneumonia (Oct 16, 2023), Psoriasis, and SIRS (systemic inflammatory response syndrome) (HCC) (06/22/2024).    PAST SURGICAL HISTORY:   has a past surgical history that includes Appendectomy; Mammo needle localization right (all inc) (Right, 07/13/2009); Skin lesion excision; Colonoscopy (10/31/2018); Mammo (historical); DXA  "procedure(historical) (04/21/2017); and Breast cyst excision (Right, 2009).    CURRENT MEDICATIONS  Current Outpatient Medications   Medication Sig Dispense Refill    aspirin 81 MG tablet Take 81 mg by mouth daily       Cholecalciferol (VITAMIN D3) 5000 units TABS 5,000 Units Daily       diphenhydrAMINE (BENADRYL) 25 mg capsule Take 25 mg by mouth every 12 (twelve) hours as needed for itching       Probiotic Product (PROBIOTIC DAILY PO) Take 1 capsule by mouth daily      vitamin B-12 (VITAMIN B-12) 1,000 mcg tablet Take 1 tablet (1,000 mcg total) by mouth daily 90 tablet 1     No current facility-administered medications for this visit.     [unfilled]    SOCIAL HISTORY:   reports that she has quit smoking. Her smoking use included cigarettes. She started smoking about 58 years ago. She has a 58.5 pack-year smoking history. She has been exposed to tobacco smoke. She has never used smokeless tobacco. She reports that she does not currently use alcohol. She reports that she does not use drugs.     FAMILY HISTORY:  family history includes Cancer in her brother and brother; Coronary artery disease in her brother; Depression in her mother; Hearing loss in her mother; Hypertension in her brother and mother; No Known Problems in her daughter, daughter, maternal aunt, maternal aunt, maternal aunt, maternal aunt, maternal grandfather, maternal grandmother, paternal aunt, paternal grandfather, and paternal grandmother; Stroke in her mother; Tuberculosis in her brother and father.     ALLERGIES:  is allergic to doxycycline, keflex [cephalexin], and neomycin-polymyxin-dexameth.      Physical Exam:  Vital Signs:   Visit Vitals  /80   Pulse 84   Temp 97.9 °F (36.6 °C)   Ht 5' 2\" (1.575 m)   Wt 47.6 kg (105 lb)   SpO2 99%   BMI 19.20 kg/m²   OB Status Postmenopausal   Smoking Status Former   BSA 1.45 m²     Body mass index is 19.2 kg/m².  Body surface area is 1.45 meters squared.    GEN: Alert, awake oriented x3, in no acute " distress  HEENT- No pallor, icterus, cyanosis, no oral mucosal lesions,   LAD - no palpable cervical, clavicle, axillary, inguinal LAD  Heart- normal S1 S2, regular rate and rhythm, No murmur, rubs.   Lungs- clear breathing sound bilateral.   Abdomen- soft, Non tender, bowel sounds present  Extremities- No cyanosis, clubbing, edema  Neuro- No focal neurological deficit    Labs:  Lab Results   Component Value Date    WBC 1.48 (L) 07/04/2024    HGB 8.0 (L) 07/04/2024    HCT 25.6 (L) 07/04/2024    MCV 99 (H) 07/04/2024     07/04/2024     Lab Results   Component Value Date    SODIUM 135 07/03/2024    K 4.1 07/03/2024     07/03/2024    CO2 24 07/03/2024    AGAP 7 07/03/2024    BUN 11 07/03/2024    CREATININE 0.93 07/03/2024    GLUC 83 07/03/2024    GLUF 85 07/01/2024    CALCIUM 8.2 (L) 07/03/2024    AST 11 (L) 07/03/2024    ALT 7 07/03/2024    ALKPHOS 55 07/03/2024    TP 5.6 (L) 07/03/2024    TBILI 0.45 07/03/2024    EGFR 61 07/03/2024     Additional recommendations to follow per attending, Dr. Castro.    I spent 40 minutes on chart review, face to face counseling time, coordination of care and documentation.    Xu Alanis DO, PGY4

## 2024-07-15 NOTE — PATIENT INSTRUCTIONS
Labs today and then again in two weeks    CT PET last week of August 2024    Wait to restart hydrea and psoriasis med until counts returned to normal/platelets increased (will call with results)    Follow up after PET but will call with labs/adjustments,

## 2024-07-16 ENCOUNTER — OFFICE VISIT (OUTPATIENT)
Age: 74
End: 2024-07-16
Payer: MEDICARE

## 2024-07-16 VITALS
HEART RATE: 79 BPM | SYSTOLIC BLOOD PRESSURE: 120 MMHG | DIASTOLIC BLOOD PRESSURE: 80 MMHG | OXYGEN SATURATION: 98 % | RESPIRATION RATE: 18 BRPM | WEIGHT: 105.2 LBS | HEIGHT: 62 IN | TEMPERATURE: 98 F | BODY MASS INDEX: 19.36 KG/M2

## 2024-07-16 DIAGNOSIS — H53.9 VISUAL DISTURBANCE: Primary | ICD-10-CM

## 2024-07-16 DIAGNOSIS — I10 PRIMARY HYPERTENSION: ICD-10-CM

## 2024-07-16 PROCEDURE — G2211 COMPLEX E/M VISIT ADD ON: HCPCS

## 2024-07-16 PROCEDURE — 99213 OFFICE O/P EST LOW 20 MIN: CPT

## 2024-07-16 NOTE — PROGRESS NOTES
Assessment/Plan:         Problem List Items Addressed This Visit     Hypertension     Continue monitoring blood pressure at home.  No antihypertensive medication at this point.  Unless patient blood pressure consistently staying elevated we will not prescribe antihypertensive at this point.  We will continue to monitor.         Visual disturbance - Primary     Visual disturbance does not seem like blood pressure related patient was advised to call her ophthalmologist and see as soon as she can              Subjective:      Patient ID: Ayla Nye is a 73 y.o. female.    Patient here for sick visit.  Patient noticed some flashing of lights on the peripheral vision this morning and she to blood pressure and what was 145/95.  Placing lasted only for few seconds and then it went away later on patient had a flushing again and she was sitting down took the blood pressure and lowered systolic was only 100 currently does not have any flashing of lights.  No headache.  No lightheadedness or dizziness.  No chest pain or shortness of breath.  No tingling numbness or weakness.        The following portions of the patient's history were reviewed and updated as appropriate:   Past Medical History:  She has a past medical history of Allergic (2022), Cataract (Nov. 2023), Essential thrombocytosis (HCC), Hyperlipidemia, Hypertension, Mass in chest, Nodular goiter, Pneumonia (Oct 16, 2023), Psoriasis, and SIRS (systemic inflammatory response syndrome) ([facility]) (06/22/2024).,  _______________________________________________________________________  Medical Problems:  does not have any pertinent problems on file.,  _______________________________________________________________________  Past Surgical History:   has a past surgical history that includes Appendectomy; Mammo needle localization right (all inc) (Right, 07/13/2009); Skin lesion excision; Colonoscopy (10/31/2018); Mammo (historical); DXA procedure(historical)  (04/21/2017); and Breast cyst excision (Right, 2009).,  _______________________________________________________________________  Family History:  family history includes Cancer in her brother and brother; Coronary artery disease in her brother; Depression in her mother; Hearing loss in her mother; Hypertension in her brother and mother; No Known Problems in her daughter, daughter, maternal aunt, maternal aunt, maternal aunt, maternal aunt, maternal grandfather, maternal grandmother, paternal aunt, paternal grandfather, and paternal grandmother; Stroke in her mother; Tuberculosis in her brother and father.,  _______________________________________________________________________  Social History:   reports that she has quit smoking. Her smoking use included cigarettes. She started smoking about 58 years ago. She has a 58.5 pack-year smoking history. She has been exposed to tobacco smoke. She has never used smokeless tobacco. She reports that she does not currently use alcohol. She reports that she does not use drugs.,  _______________________________________________________________________  Allergies:  is allergic to doxycycline, keflex [cephalexin], and neomycin-polymyxin-dexameth..  _______________________________________________________________________  Current Outpatient Medications   Medication Sig Dispense Refill   • aspirin 81 MG tablet Take 81 mg by mouth daily      • Cholecalciferol (VITAMIN D3) 5000 units TABS 5,000 Units Daily      • diphenhydrAMINE (BENADRYL) 25 mg capsule Take 25 mg by mouth every 12 (twelve) hours as needed for itching      • Probiotic Product (PROBIOTIC DAILY PO) Take 1 capsule by mouth daily     • vitamin B-12 (VITAMIN B-12) 1,000 mcg tablet Take 1 tablet (1,000 mcg total) by mouth daily 90 tablet 1     No current facility-administered medications for this visit.     _______________________________________________________________________  Review of Systems   Constitutional:  Positive  "for fatigue. Negative for chills and fever.   HENT:  Negative for congestion, ear pain, rhinorrhea, sneezing and sore throat.    Eyes:  Positive for visual disturbance. Negative for redness and itching.   Respiratory:  Negative for cough, chest tightness and shortness of breath.    Cardiovascular:  Negative for chest pain, palpitations and leg swelling.   Gastrointestinal:  Negative for abdominal pain, blood in stool, diarrhea, nausea and vomiting.   Endocrine: Negative for cold intolerance and heat intolerance.   Genitourinary:  Negative for dysuria, frequency and urgency.   Musculoskeletal:  Negative for arthralgias, back pain and myalgias.   Skin:  Negative for color change and rash.   Neurological:  Negative for dizziness, weakness, light-headedness, numbness and headaches.   Hematological:  Does not bruise/bleed easily.   Psychiatric/Behavioral:  Negative for agitation, behavioral problems and confusion.          Objective:  Vitals:    07/16/24 1509   BP: 120/80   BP Location: Right arm   Patient Position: Sitting   Cuff Size: Standard   Pulse: 79   Resp: 18   Temp: 98 °F (36.7 °C)   TempSrc: Tympanic   SpO2: 98%   Weight: 47.7 kg (105 lb 3.2 oz)   Height: 5' 2\" (1.575 m)     Body mass index is 19.24 kg/m².     Physical Exam  Vitals and nursing note reviewed.   Constitutional:       General: She is not in acute distress.     Appearance: Normal appearance. She is not ill-appearing, toxic-appearing or diaphoretic.   HENT:      Head: Normocephalic and atraumatic.      Nose: Nose normal. No congestion.      Mouth/Throat:      Mouth: Mucous membranes are moist.   Eyes:      General: No scleral icterus.        Right eye: No discharge.         Left eye: No discharge.      Extraocular Movements: Extraocular movements intact.      Conjunctiva/sclera: Conjunctivae normal.      Pupils: Pupils are equal, round, and reactive to light.   Cardiovascular:      Rate and Rhythm: Normal rate and regular rhythm.      Pulses: " Normal pulses.      Heart sounds: Normal heart sounds. No murmur heard.     No gallop.   Pulmonary:      Effort: Pulmonary effort is normal. No respiratory distress.      Breath sounds: Normal breath sounds. No wheezing, rhonchi or rales.   Abdominal:      General: Abdomen is flat. Bowel sounds are normal. There is no distension.      Palpations: Abdomen is soft.      Tenderness: There is no abdominal tenderness. There is no guarding.   Musculoskeletal:         General: No swelling or tenderness. Normal range of motion.      Cervical back: Normal range of motion and neck supple. No rigidity.      Right lower leg: No edema.      Left lower leg: No edema.   Lymphadenopathy:      Cervical: No cervical adenopathy.   Skin:     General: Skin is warm.      Capillary Refill: Capillary refill takes 2 to 3 seconds.      Coloration: Skin is not jaundiced.      Findings: No bruising or rash.   Neurological:      General: No focal deficit present.      Mental Status: She is alert and oriented to person, place, and time. Mental status is at baseline.      Gait: Gait normal.   Psychiatric:         Mood and Affect: Mood normal.

## 2024-07-16 NOTE — ASSESSMENT & PLAN NOTE
Continue monitoring blood pressure at home.  No antihypertensive medication at this point.  Unless patient blood pressure consistently staying elevated we will not prescribe antihypertensive at this point.  We will continue to monitor.

## 2024-07-16 NOTE — ASSESSMENT & PLAN NOTE
Visual disturbance does not seem like blood pressure related patient was advised to call her ophthalmologist and see as soon as she can

## 2024-07-29 ENCOUNTER — APPOINTMENT (EMERGENCY)
Dept: CT IMAGING | Facility: HOSPITAL | Age: 74
DRG: 054 | End: 2024-07-29
Payer: MEDICARE

## 2024-07-29 ENCOUNTER — APPOINTMENT (OUTPATIENT)
Dept: LAB | Facility: CLINIC | Age: 74
DRG: 054 | End: 2024-07-29
Payer: MEDICARE

## 2024-07-29 ENCOUNTER — HOSPITAL ENCOUNTER (INPATIENT)
Facility: HOSPITAL | Age: 74
LOS: 3 days | Discharge: HOME HEALTH | DRG: 054 | End: 2024-08-01
Attending: EMERGENCY MEDICINE | Admitting: INTERNAL MEDICINE
Payer: MEDICARE

## 2024-07-29 DIAGNOSIS — R29.90 STROKE-LIKE SYMPTOMS: ICD-10-CM

## 2024-07-29 DIAGNOSIS — R27.0 ATAXIA: ICD-10-CM

## 2024-07-29 DIAGNOSIS — C34.91 NSCLC OF RIGHT LUNG (HCC): ICD-10-CM

## 2024-07-29 DIAGNOSIS — C79.31 METASTATIC CANCER TO BRAIN (HCC): Primary | ICD-10-CM

## 2024-07-29 DIAGNOSIS — H53.9 VISUAL DISTURBANCE: ICD-10-CM

## 2024-07-29 DIAGNOSIS — E78.2 MIXED HYPERLIPIDEMIA: ICD-10-CM

## 2024-07-29 LAB
ALBUMIN SERPL BCG-MCNC: 4 G/DL (ref 3.5–5)
ALP SERPL-CCNC: 60 U/L (ref 34–104)
ALT SERPL W P-5'-P-CCNC: 7 U/L (ref 7–52)
ANION GAP SERPL CALCULATED.3IONS-SCNC: 5 MMOL/L (ref 4–13)
ANION GAP SERPL CALCULATED.3IONS-SCNC: 6 MMOL/L (ref 4–13)
APTT PPP: 26 SECONDS (ref 23–37)
AST SERPL W P-5'-P-CCNC: 14 U/L (ref 13–39)
BASOPHILS # BLD AUTO: 0.03 THOUSANDS/ÂΜL (ref 0–0.1)
BASOPHILS NFR BLD AUTO: 1 % (ref 0–1)
BILIRUB SERPL-MCNC: 0.33 MG/DL (ref 0.2–1)
BUN SERPL-MCNC: 18 MG/DL (ref 5–25)
BUN SERPL-MCNC: 22 MG/DL (ref 5–25)
CALCIUM SERPL-MCNC: 8.9 MG/DL (ref 8.4–10.2)
CALCIUM SERPL-MCNC: 9 MG/DL (ref 8.4–10.2)
CARDIAC TROPONIN I PNL SERPL HS: 5 NG/L
CHLORIDE SERPL-SCNC: 105 MMOL/L (ref 96–108)
CHLORIDE SERPL-SCNC: 107 MMOL/L (ref 96–108)
CO2 SERPL-SCNC: 26 MMOL/L (ref 21–32)
CO2 SERPL-SCNC: 28 MMOL/L (ref 21–32)
CREAT SERPL-MCNC: 1 MG/DL (ref 0.6–1.3)
CREAT SERPL-MCNC: 1.09 MG/DL (ref 0.6–1.3)
EOSINOPHIL # BLD AUTO: 0.05 THOUSAND/ÂΜL (ref 0–0.61)
EOSINOPHIL NFR BLD AUTO: 1 % (ref 0–6)
ERYTHROCYTE [DISTWIDTH] IN BLOOD BY AUTOMATED COUNT: 15.9 % (ref 11.6–15.1)
ERYTHROCYTE [DISTWIDTH] IN BLOOD BY AUTOMATED COUNT: 16.2 % (ref 11.6–15.1)
FLUAV RNA RESP QL NAA+PROBE: NEGATIVE
FLUBV RNA RESP QL NAA+PROBE: NEGATIVE
GFR SERPL CREATININE-BSD FRML MDRD: 50 ML/MIN/1.73SQ M
GFR SERPL CREATININE-BSD FRML MDRD: 56 ML/MIN/1.73SQ M
GLUCOSE P FAST SERPL-MCNC: 82 MG/DL (ref 65–99)
GLUCOSE SERPL-MCNC: 100 MG/DL (ref 65–140)
GLUCOSE SERPL-MCNC: 93 MG/DL (ref 65–140)
HCT VFR BLD AUTO: 31.1 % (ref 34.8–46.1)
HCT VFR BLD AUTO: 32.9 % (ref 34.8–46.1)
HGB BLD-MCNC: 10.2 G/DL (ref 11.5–15.4)
HGB BLD-MCNC: 9.7 G/DL (ref 11.5–15.4)
IMM GRANULOCYTES # BLD AUTO: 0.02 THOUSAND/UL (ref 0–0.2)
IMM GRANULOCYTES NFR BLD AUTO: 1 % (ref 0–2)
INR PPP: 0.91 (ref 0.84–1.19)
LDH SERPL-CCNC: 174 U/L (ref 140–271)
LYMPHOCYTES # BLD AUTO: 0.34 THOUSANDS/ÂΜL (ref 0.6–4.47)
LYMPHOCYTES NFR BLD AUTO: 9 % (ref 14–44)
MAGNESIUM SERPL-MCNC: 2.1 MG/DL (ref 1.9–2.7)
MCH RBC QN AUTO: 31.9 PG (ref 26.8–34.3)
MCH RBC QN AUTO: 32 PG (ref 26.8–34.3)
MCHC RBC AUTO-ENTMCNC: 31 G/DL (ref 31.4–37.4)
MCHC RBC AUTO-ENTMCNC: 31.2 G/DL (ref 31.4–37.4)
MCV RBC AUTO: 103 FL (ref 82–98)
MCV RBC AUTO: 103 FL (ref 82–98)
MONOCYTES # BLD AUTO: 0.4 THOUSAND/ÂΜL (ref 0.17–1.22)
MONOCYTES NFR BLD AUTO: 11 % (ref 4–12)
NEUTROPHILS # BLD AUTO: 2.95 THOUSANDS/ÂΜL (ref 1.85–7.62)
NEUTS SEG NFR BLD AUTO: 77 % (ref 43–75)
NRBC BLD AUTO-RTO: 0 /100 WBCS
PLATELET # BLD AUTO: 199 THOUSANDS/UL (ref 149–390)
PLATELET # BLD AUTO: 221 THOUSANDS/UL (ref 149–390)
PLATELET # BLD AUTO: 230 THOUSANDS/UL (ref 149–390)
PMV BLD AUTO: 8.8 FL (ref 8.9–12.7)
PMV BLD AUTO: 8.8 FL (ref 8.9–12.7)
PMV BLD AUTO: 9 FL (ref 8.9–12.7)
POTASSIUM SERPL-SCNC: 4.1 MMOL/L (ref 3.5–5.3)
POTASSIUM SERPL-SCNC: 4.5 MMOL/L (ref 3.5–5.3)
PROT SERPL-MCNC: 6.3 G/DL (ref 6.4–8.4)
PROTHROMBIN TIME: 12.8 SECONDS (ref 11.6–14.5)
RBC # BLD AUTO: 3.03 MILLION/UL (ref 3.81–5.12)
RBC # BLD AUTO: 3.2 MILLION/UL (ref 3.81–5.12)
RSV RNA RESP QL NAA+PROBE: NEGATIVE
SARS-COV-2 RNA RESP QL NAA+PROBE: NEGATIVE
SODIUM SERPL-SCNC: 137 MMOL/L (ref 135–147)
SODIUM SERPL-SCNC: 140 MMOL/L (ref 135–147)
WBC # BLD AUTO: 3.79 THOUSAND/UL (ref 4.31–10.16)
WBC # BLD AUTO: 4.67 THOUSAND/UL (ref 4.31–10.16)

## 2024-07-29 PROCEDURE — 84484 ASSAY OF TROPONIN QUANT: CPT

## 2024-07-29 PROCEDURE — 80053 COMPREHEN METABOLIC PANEL: CPT

## 2024-07-29 PROCEDURE — 96374 THER/PROPH/DIAG INJ IV PUSH: CPT

## 2024-07-29 PROCEDURE — 0241U HB NFCT DS VIR RESP RNA 4 TRGT: CPT

## 2024-07-29 PROCEDURE — 83615 LACTATE (LD) (LDH) ENZYME: CPT

## 2024-07-29 PROCEDURE — 83735 ASSAY OF MAGNESIUM: CPT

## 2024-07-29 PROCEDURE — 99291 CRITICAL CARE FIRST HOUR: CPT | Performed by: EMERGENCY MEDICINE

## 2024-07-29 PROCEDURE — 70496 CT ANGIOGRAPHY HEAD: CPT

## 2024-07-29 PROCEDURE — 93005 ELECTROCARDIOGRAM TRACING: CPT

## 2024-07-29 PROCEDURE — 85049 AUTOMATED PLATELET COUNT: CPT

## 2024-07-29 PROCEDURE — 85025 COMPLETE CBC W/AUTO DIFF WBC: CPT

## 2024-07-29 PROCEDURE — 85610 PROTHROMBIN TIME: CPT

## 2024-07-29 PROCEDURE — 80048 BASIC METABOLIC PNL TOTAL CA: CPT

## 2024-07-29 PROCEDURE — 85027 COMPLETE CBC AUTOMATED: CPT

## 2024-07-29 PROCEDURE — 85730 THROMBOPLASTIN TIME PARTIAL: CPT

## 2024-07-29 PROCEDURE — 36415 COLL VENOUS BLD VENIPUNCTURE: CPT

## 2024-07-29 PROCEDURE — 82948 REAGENT STRIP/BLOOD GLUCOSE: CPT

## 2024-07-29 PROCEDURE — 99285 EMERGENCY DEPT VISIT HI MDM: CPT

## 2024-07-29 PROCEDURE — 70498 CT ANGIOGRAPHY NECK: CPT

## 2024-07-29 RX ORDER — ENOXAPARIN SODIUM 100 MG/ML
40 INJECTION SUBCUTANEOUS DAILY
Status: DISCONTINUED | OUTPATIENT
Start: 2024-07-30 | End: 2024-08-01 | Stop reason: HOSPADM

## 2024-07-29 RX ORDER — ASPIRIN 81 MG/1
81 TABLET, CHEWABLE ORAL DAILY
Status: CANCELLED | OUTPATIENT
Start: 2024-07-30

## 2024-07-29 RX ORDER — ASPIRIN 325 MG
325 TABLET ORAL ONCE
Status: COMPLETED | OUTPATIENT
Start: 2024-07-29 | End: 2024-07-29

## 2024-07-29 RX ORDER — ATORVASTATIN CALCIUM 40 MG/1
40 TABLET, FILM COATED ORAL EVERY EVENING
Status: DISCONTINUED | OUTPATIENT
Start: 2024-07-29 | End: 2024-08-01 | Stop reason: HOSPADM

## 2024-07-29 RX ORDER — LEVETIRACETAM 500 MG/5ML
1000 INJECTION, SOLUTION, CONCENTRATE INTRAVENOUS ONCE
Status: COMPLETED | OUTPATIENT
Start: 2024-07-29 | End: 2024-07-29

## 2024-07-29 RX ORDER — ASPIRIN 81 MG/1
81 TABLET, CHEWABLE ORAL DAILY
Status: DISCONTINUED | OUTPATIENT
Start: 2024-07-30 | End: 2024-08-01 | Stop reason: HOSPADM

## 2024-07-29 RX ORDER — LEVETIRACETAM 500 MG/5ML
750 INJECTION, SOLUTION, CONCENTRATE INTRAVENOUS EVERY 12 HOURS SCHEDULED
Status: DISCONTINUED | OUTPATIENT
Start: 2024-07-30 | End: 2024-08-01 | Stop reason: HOSPADM

## 2024-07-29 RX ADMIN — LEVETIRACETAM 1000 MG: 100 INJECTION, SOLUTION INTRAVENOUS at 20:58

## 2024-07-29 RX ADMIN — IOHEXOL 85 ML: 350 INJECTION, SOLUTION INTRAVENOUS at 20:19

## 2024-07-29 RX ADMIN — ASPIRIN 325 MG ORAL TABLET 325 MG: 325 PILL ORAL at 21:00

## 2024-07-29 RX ADMIN — ATORVASTATIN CALCIUM 40 MG: 40 TABLET, FILM COATED ORAL at 23:45

## 2024-07-30 ENCOUNTER — APPOINTMENT (INPATIENT)
Dept: MRI IMAGING | Facility: HOSPITAL | Age: 74
DRG: 054 | End: 2024-07-30
Payer: MEDICARE

## 2024-07-30 ENCOUNTER — HOME HEALTH ADMISSION (OUTPATIENT)
Dept: HOME HEALTH SERVICES | Facility: HOME HEALTHCARE | Age: 74
End: 2024-07-30
Payer: MEDICARE

## 2024-07-30 ENCOUNTER — APPOINTMENT (INPATIENT)
Dept: NON INVASIVE DIAGNOSTICS | Facility: HOSPITAL | Age: 74
DRG: 054 | End: 2024-07-30
Payer: MEDICARE

## 2024-07-30 PROBLEM — C79.31 MALIGNANT NEOPLASM METASTATIC TO BRAIN (HCC): Status: ACTIVE | Noted: 2024-07-29

## 2024-07-30 PROBLEM — G93.89 BRAIN MASS: Status: ACTIVE | Noted: 2024-07-30

## 2024-07-30 LAB
2HR DELTA HS TROPONIN: 1 NG/L
AORTIC ROOT: 3.2 CM
APICAL FOUR CHAMBER EJECTION FRACTION: 57 %
ASCENDING AORTA: 3.5 CM
BSA FOR ECHO PROCEDURE: 1.44 M2
CARDIAC TROPONIN I PNL SERPL HS: 6 NG/L
CHOLEST SERPL-MCNC: 196 MG/DL
E WAVE DECELERATION TIME: 354 MS
E/A RATIO: 0.63
EST. AVERAGE GLUCOSE BLD GHB EST-MCNC: 105 MG/DL
FRACTIONAL SHORTENING: 31 (ref 28–44)
HBA1C MFR BLD: 5.3 %
HDLC SERPL-MCNC: 60 MG/DL
INTERVENTRICULAR SEPTUM IN DIASTOLE (PARASTERNAL SHORT AXIS VIEW): 1 CM
INTERVENTRICULAR SEPTUM: 1 CM (ref 0.6–1.1)
LAAS-AP2: 12.8 CM2
LAAS-AP4: 9.9 CM2
LDLC SERPL CALC-MCNC: 121 MG/DL (ref 0–100)
LEFT ATRIUM SIZE: 3.4 CM
LEFT ATRIUM VOLUME (MOD BIPLANE): 26 ML
LEFT ATRIUM VOLUME INDEX (MOD BIPLANE): 17.9 ML/M2
LEFT INTERNAL DIMENSION IN SYSTOLE: 2.2 CM (ref 2.1–4)
LEFT VENTRICULAR INTERNAL DIMENSION IN DIASTOLE: 3.2 CM (ref 3.5–6)
LEFT VENTRICULAR POSTERIOR WALL IN END DIASTOLE: 1.1 CM
LEFT VENTRICULAR STROKE VOLUME: 25 ML
LVSV (TEICH): 25 ML
MV E'TISSUE VEL-LAT: 6 CM/S
MV E'TISSUE VEL-SEP: 6 CM/S
MV PEAK A VEL: 0.89 M/S
MV PEAK E VEL: 56 CM/S
MV STENOSIS PRESSURE HALF TIME: 103 MS
MV VALVE AREA P 1/2 METHOD: 2.14
RA PRESSURE ESTIMATED: 3 MMHG
RIGHT ATRIUM AREA SYSTOLE A4C: 10.7 CM2
RIGHT VENTRICLE ID DIMENSION: 4.4 CM
RV PSP: 29 MMHG
SL CV LEFT ATRIUM LENGTH A2C: 4.1 CM
SL CV LV EF: 60
SL CV PED ECHO LEFT VENTRICLE DIASTOLIC VOLUME (MOD BIPLANE) 2D: 41 ML
SL CV PED ECHO LEFT VENTRICLE SYSTOLIC VOLUME (MOD BIPLANE) 2D: 15 ML
TR MAX PG: 26 MMHG
TR PEAK VELOCITY: 2.6 M/S
TRICUSPID ANNULAR PLANE SYSTOLIC EXCURSION: 1.9 CM
TRICUSPID VALVE PEAK REGURGITATION VELOCITY: 2.57 M/S
TRIGL SERPL-MCNC: 76 MG/DL

## 2024-07-30 PROCEDURE — RECHECK: Performed by: INTERNAL MEDICINE

## 2024-07-30 PROCEDURE — 93306 TTE W/DOPPLER COMPLETE: CPT | Performed by: INTERNAL MEDICINE

## 2024-07-30 PROCEDURE — 93306 TTE W/DOPPLER COMPLETE: CPT

## 2024-07-30 PROCEDURE — 80061 LIPID PANEL: CPT

## 2024-07-30 PROCEDURE — 99223 1ST HOSP IP/OBS HIGH 75: CPT | Performed by: NEUROLOGICAL SURGERY

## 2024-07-30 PROCEDURE — 70553 MRI BRAIN STEM W/O & W/DYE: CPT

## 2024-07-30 PROCEDURE — 83036 HEMOGLOBIN GLYCOSYLATED A1C: CPT

## 2024-07-30 PROCEDURE — 99223 1ST HOSP IP/OBS HIGH 75: CPT | Performed by: INTERNAL MEDICINE

## 2024-07-30 PROCEDURE — 99233 SBSQ HOSP IP/OBS HIGH 50: CPT | Performed by: INTERNAL MEDICINE

## 2024-07-30 PROCEDURE — 99223 1ST HOSP IP/OBS HIGH 75: CPT | Performed by: PSYCHIATRY & NEUROLOGY

## 2024-07-30 PROCEDURE — A9585 GADOBUTROL INJECTION: HCPCS | Performed by: INTERNAL MEDICINE

## 2024-07-30 PROCEDURE — 97116 GAIT TRAINING THERAPY: CPT

## 2024-07-30 PROCEDURE — 97163 PT EVAL HIGH COMPLEX 45 MIN: CPT

## 2024-07-30 RX ORDER — PANTOPRAZOLE SODIUM 40 MG/1
40 TABLET, DELAYED RELEASE ORAL
Status: DISCONTINUED | OUTPATIENT
Start: 2024-07-31 | End: 2024-08-01 | Stop reason: HOSPADM

## 2024-07-30 RX ORDER — GADOBUTROL 604.72 MG/ML
5 INJECTION INTRAVENOUS
Status: COMPLETED | OUTPATIENT
Start: 2024-07-30 | End: 2024-07-30

## 2024-07-30 RX ORDER — DEXAMETHASONE 4 MG/1
4 TABLET ORAL DAILY
Status: DISCONTINUED | OUTPATIENT
Start: 2024-07-31 | End: 2024-08-01 | Stop reason: HOSPADM

## 2024-07-30 RX ORDER — DEXAMETHASONE SODIUM PHOSPHATE 10 MG/ML
10 INJECTION, SOLUTION INTRAMUSCULAR; INTRAVENOUS ONCE
Status: COMPLETED | OUTPATIENT
Start: 2024-07-30 | End: 2024-07-30

## 2024-07-30 RX ADMIN — ATORVASTATIN CALCIUM 40 MG: 40 TABLET, FILM COATED ORAL at 17:54

## 2024-07-30 RX ADMIN — LEVETIRACETAM 750 MG: 100 INJECTION, SOLUTION INTRAVENOUS at 09:18

## 2024-07-30 RX ADMIN — ASPIRIN 81 MG 81 MG: 81 TABLET ORAL at 09:18

## 2024-07-30 RX ADMIN — DEXAMETHASONE SODIUM PHOSPHATE 10 MG: 10 INJECTION INTRAMUSCULAR; INTRAVENOUS at 12:02

## 2024-07-30 RX ADMIN — ENOXAPARIN SODIUM 40 MG: 40 INJECTION SUBCUTANEOUS at 09:18

## 2024-07-30 RX ADMIN — LEVETIRACETAM 750 MG: 100 INJECTION, SOLUTION INTRAVENOUS at 21:08

## 2024-07-30 RX ADMIN — GADOBUTROL 5 ML: 604.72 INJECTION INTRAVENOUS at 05:55

## 2024-07-30 NOTE — PROGRESS NOTES
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 Findings are concerning for occult underlying metastases. Acute infarcts cannot be excluded however   infarcts in 2 vascular territories and with findings suggesting subacute infarcts, not agreement with the clinical timeline suggest stroke is less likely.     Also concern for possible seizure given right eye visual changes and occipital lesions in the brain    Decision made-no TNK candidate given unclear imaging findings, stroke versus metastatic lesions  Keppra loaded at the ED and given aspirin load    MRI: 1. Multiple supratentorial and infratentorial enhancing lesions with associated vasogenic edema, the largest of which is in the left occipital lobe. Findings are most consistent with metastases.  2. No acute infarct or midline shift.      Plan  Neurology consulted; following  Stroke pathway  Permissive hypertension less than 180  Continue aspirin  Started decadron (10mg loaded, followed by 4mg daily) for vasogenic edema with midline shift  Continue with Keppra 750 twice daily  Neurosurgery consult- no surgical intervention at this time.  Oncology & Radiation oncology consulted consulted recommendations appreciated        Malignant neoplasm of upper lobe, right bronchus or lung (HCC)  Assessment & Plan  Patient with history of right primary lung  adenocarcinoma (non-small cell carcinoma) diagnosed in 2024  Patient is on concurrent chemoradiation with Taxol and carboplatin.  Last treatment 7/2  One radiation treatment given here.  7/3 CT C/A/P: interval progression of neoplastic disease -findings inconclusive given patient is also on active treatment.  Plan is to get a PET/CT August 2024 to determine next treatment option possibly immunotherapy  Patient presents today with strokelike symptoms and CT head showing vasogenic edema and findings concerning for possible metastatic disease  Repeat MRI findings consistent with metastases, oncology and radiation oncology consulted and recommendations appreciated.    CKD (chronic kidney disease) stage 3, GFR 30-59 ml/min (HCC)  Assessment & Plan  Lab Results   Component Value Date    EGFR 50 07/29/2024    EGFR 56 07/29/2024    EGFR 61 07/15/2024    CREATININE 1.09 07/29/2024    CREATININE 1.00 07/29/2024    CREATININE 0.92 07/15/2024   At baseline,  Continue to monitor, avoid NSAIDs     Essential thrombocytosis (HCC)  Assessment & Plan  Was on Hydroxyurea- Now held  Follows up with heme-onc  Hold Hydroxyurea    * Hypertension  Assessment & Plan  Home regimen-none  Systolic blood pressure in the 160s    Plan  Permissive hypertension for possible stroke < 180         VTE Pharmacologic Prophylaxis: VTE Score: 7 High Risk (Score >/= 5) - Pharmacological DVT Prophylaxis Ordered: enoxaparin (Lovenox). Sequential Compression Devices Ordered.    Mobility:   Basic Mobility Inpatient Raw Score: 20  JH-HLM Goal: 6: Walk 10 steps or more  JH-HLM Achieved: 7: Walk 25 feet or more  JH-HLM Goal achieved. Continue to encourage appropriate mobility.    Patient Centered Rounds: I performed bedside rounds with nursing staff today.  Discussions with Specialists or Other Care Team Provider: Neurosurgery, Neurology, Radiation Oncology, & Oncology    Education and Discussions with Family / Patient: Updated  (daughter) at  "bedside.    Current Length of Stay: 1 day(s)  Current Patient Status: Inpatient   Discharge Plan: Anticipate discharge in 24-48 hrs to home with home services.    Code Status: Level 1 - Full Code    Subjective:   Patient seen resting in bed.  No acute events overnight.  Patient reports resolution of most of her symptomology except continues to feel a little \"wobbly\" while ambulating.  Patient was able to ambulate during transport to [facility], as well as to her bathroom short distance.  Patient reports no more flashing in her right eye, or abnormal gait, and she denies any pain.  Patient also denies shortness of breath, palpitations.  Patient does states she feels slightly dizzy when getting up from bed; that is self resolving.  Otherwise patient has no new symptomology and is feeling improved but not at her baseline.    Objective:     Vitals:   Temp (24hrs), Av.3 °F (36.8 °C), Min:98.1 °F (36.7 °C), Max:98.5 °F (36.9 °C)    Temp:  [98.1 °F (36.7 °C)-98.5 °F (36.9 °C)] 98.2 °F (36.8 °C)  HR:  [62-84] 62  Resp:  [12-18] 14  BP: (116-174)/(67-95) 128/76  SpO2:  [96 %-100 %] 97 %  Body mass index is 18.84 kg/m².     Input and Output Summary (last 24 hours):     Intake/Output Summary (Last 24 hours) at 2024 1646  Last data filed at 2024 0900  Gross per 24 hour   Intake 240 ml   Output 350 ml   Net -110 ml       Physical Exam:   Physical Exam  Constitutional:       General: She is not in acute distress.     Appearance: Normal appearance. She is normal weight. She is not ill-appearing, toxic-appearing or diaphoretic.   HENT:      Head: Normocephalic and atraumatic.      Nose: No congestion or rhinorrhea.      Mouth/Throat:      Pharynx: No oropharyngeal exudate or posterior oropharyngeal erythema.   Eyes:      General: No scleral icterus.        Right eye: No discharge.         Left eye: No discharge.      Extraocular Movements: Extraocular movements intact.      Conjunctiva/sclera: Conjunctivae normal. "   Cardiovascular:      Rate and Rhythm: Normal rate and regular rhythm.      Pulses: Normal pulses.      Heart sounds: Normal heart sounds. No murmur heard.     No friction rub. No gallop.   Pulmonary:      Effort: Pulmonary effort is normal. No respiratory distress.      Breath sounds: Normal breath sounds. No stridor. No wheezing, rhonchi or rales.   Chest:      Chest wall: No tenderness.   Abdominal:      General: Abdomen is flat. Bowel sounds are normal. There is no distension.      Palpations: Abdomen is soft. There is no mass.      Tenderness: There is no abdominal tenderness. There is no guarding or rebound.      Hernia: No hernia is present.   Musculoskeletal:         General: No swelling, tenderness, deformity or signs of injury.      Cervical back: Normal range of motion. No rigidity.      Right lower leg: No edema.      Left lower leg: No edema.   Skin:     Coloration: Skin is not jaundiced or pale.      Findings: No bruising, erythema, lesion or rash.   Neurological:      General: No focal deficit present.      Mental Status: She is alert and oriented to person, place, and time.      Cranial Nerves: No dysarthria or facial asymmetry.      Motor: No atrophy, abnormal muscle tone, seizure activity or pronator drift.      Coordination: Coordination abnormal (patient is slightly trepidatious as she ambulates but is able to do so well.). Finger-Nose-Finger Test and Heel to Shin Test normal. Rapid alternating movements normal.      Gait: Gait is intact.      Comments: Patient struggled with heel to toe walking but accomplished it.        Additional Data:     Labs:  Results from last 7 days   Lab Units 07/29/24  2339 07/29/24  1958 07/29/24  0923   WBC Thousand/uL  --  4.67 3.79*   HEMOGLOBIN g/dL  --  9.7* 10.2*   HEMATOCRIT %  --  31.1* 32.9*   PLATELETS Thousands/uL 199 230 221   SEGS PCT %  --   --  77*   LYMPHO PCT %  --   --  9*   MONO PCT %  --   --  11   EOS PCT %  --   --  1     Results from last 7  days   Lab Units 07/29/24 1958 07/29/24  0923   SODIUM mmol/L 137 140   POTASSIUM mmol/L 4.1 4.5   CHLORIDE mmol/L 105 107   CO2 mmol/L 26 28   BUN mg/dL 22 18   CREATININE mg/dL 1.09 1.00   ANION GAP mmol/L 6 5   CALCIUM mg/dL 9.0 8.9   ALBUMIN g/dL  --  4.0   TOTAL BILIRUBIN mg/dL  --  0.33   ALK PHOS U/L  --  60   ALT U/L  --  7   AST U/L  --  14   GLUCOSE RANDOM mg/dL 93  --      Results from last 7 days   Lab Units 07/29/24 1958   INR  0.91     Results from last 7 days   Lab Units 07/29/24  1941   POC GLUCOSE mg/dl 100     Results from last 7 days   Lab Units 07/30/24  0502   HEMOGLOBIN A1C % 5.3           Lines/Drains:  Invasive Devices       Peripheral Intravenous Line  Duration             Peripheral IV 07/29/24 Right Antecubital <1 day                      Telemetry:  Telemetry Orders (From admission, onward)               24 Hour Telemetry Monitoring  Continuous x 24 Hours (Telem)        Question:  Reason for 24 Hour Telemetry  Answer:  TIA/Suspected CVA/ Confirmed CVA                     Telemetry Reviewed: Normal Sinus Rhythm  Indication for Continued Telemetry Use: stroke protocol             Imaging: Reviewed radiology reports from this admission including: MRI brain    Recent Cultures (last 7 days):         Last 24 Hours Medication List:   Current Facility-Administered Medications   Medication Dose Route Frequency Provider Last Rate    aspirin  81 mg Oral Daily Kalpana Dang MD      atorvastatin  40 mg Oral QPM Kalpana Dang MD      [START ON 7/31/2024] dexamethasone  4 mg Oral Daily Damaso Silva MD      enoxaparin  40 mg Subcutaneous Daily Kalpana Dang MD      levETIRAcetam  750 mg Intravenous Q12H KRISS Kalpana Dang MD          Today, Patient Was Seen By: Jelena Gamble MD    **Please Note: This note may have been constructed using a voice recognition system.**

## 2024-07-30 NOTE — ASSESSMENT & PLAN NOTE
Brain metastasis  In setting of NSC lung adenocarcinoma diagnosed 3/2024, s/p XRT and paclitaxel and carboplatin (follows with Dr. Castro).  Presented 7/29 with c/o expressive aphasia and peripheral vision loss.  Symptoms have improved since yesterday.  Exam is non-focal. Subjective peripheral vision loss.    Imaging:  MRI brain w/wo, 7/30/2024: 1. Multiple supratentorial and infratentorial enhancing lesions with associated vasogenic edema, the largest of which is in the left occipital lobe. Findings are most consistent with metastases. 2. No acute infarct or midline shift.    Plan:  Reviewed results of imaging extensively with patient and family.  STAT CT head with decline in GCS > 2 pts in 1 hour.  Agree with rad/onc that WBR is most appropriate treatment at this time.  No role for surgical intervention.  Steroids per hem/onc and rad/onc.  Mobilize with PT/OT.  DVT ppx: SCDs, Lovenox.    Neurosurgery will sign off. No follow up.

## 2024-07-30 NOTE — ASSESSMENT & PLAN NOTE
Lab Results   Component Value Date    EGFR 50 07/29/2024    EGFR 56 07/29/2024    EGFR 61 07/15/2024    CREATININE 1.09 07/29/2024    CREATININE 1.00 07/29/2024    CREATININE 0.92 07/15/2024   At baseline,  Continue to monitor, avoid NSAIDs

## 2024-07-30 NOTE — PLAN OF CARE
Problem: PHYSICAL THERAPY ADULT  Goal: Performs mobility at highest level of function for planned discharge setting.  See evaluation for individualized goals.  Description: Treatment/Interventions: Functional transfer training, LE strengthening/ROM, Elevations, Therapeutic exercise, Endurance training, Patient/family training, Equipment eval/education, Gait training, Compensatory technique education, Spoke to nursing, Spoke to case management, OT, Family, Bed mobility  Equipment Recommended: Cane (SPC)       See flowsheet documentation for full assessment, interventions and recommendations.  Note: Prognosis: Fair  Problem List: Decreased strength, Decreased endurance, Impaired balance, Decreased mobility, Decreased coordination, Impaired vision  Assessment: Pt is a 74 yo female admitted to [facility] on 7/29/2024 with primary dx of hypertension following c/o imbalance, dizziness/disorientation. Additional pertinent information: MRI brain w/wo, 7/30/2024: Multiple supratentorial and infratentorial enhancing lesions with associated vasogenic edema, the largest of which is in the left occipital lobe. Findings are most consistent with metastases. Pt PMH significant for CKD, lung cancer, SIRS, TB lung, age-related osteoporosis without current pathological fracture, and b/l leg pain. PT consulted with orders for PT eval and treat.  Prior to admission pt was independent with all ALD, IADLs, functional mobility and ambulation with assistance or use of AD. Upon evaluation, pt presents with decreased balance, LE deficits in strength and decreased activity tolerance. Pt requires supervision for bed mobility, sit<>stand transfers with supervision. Pt ambulates with min Ax1 with min Ax1 in order to correct instability L>R. Pt performs stairs with min Ax1 due to decreased stability and control descend>ascend.   Pt is currently not functioning at baseline and would benefit from skilled PT services in order to address aforementioned  impairments, return to PLOF, increase functional mobility, decrease caregiver burden and improve QOL. Given discussed impairments, patient PMH, medical management, pt current medical status is unstable/unpredictable. Pt recommendation is level III at discharge.        Rehab Resource Intensity Level, PT: III (Minimum Resource Intensity) (.)    See flowsheet documentation for full assessment.

## 2024-07-30 NOTE — H&P
[facility]  H&P  Name: Ayla Nye 73 y.o. female I MRN: [id number]  Unit/Bed#: S -01 I Date of Admission: 7/29/2024   Date of Service: 7/30/2024 I Hospital Day: 1      Assessment & Plan   Stroke-like symptoms  Assessment & Plan  1913 patient presents today with visual changes, flashing lights and wide-based gait  Cerebellar symptoms noted-Romanenko  And abnormal finger-nose test  1955 patient is a stroke alert NIHS score 2  CTA head and neck-no  LVO-  CT head  Small area of hypoattenuation in the left superior frontal lobe and larger area in the left posterior parietal region, suggestive of vasogenic edema. Findings are concerning for occult underlying metastases. Acute infarcts cannot be excluded however   infarcts in 2 vascular territories and with findings suggesting subacute infarcts, not agreement with the clinical timeline suggest stroke is less likely.     Also concern for possible seizure given right eye visual changes and occipital lesions in the brain    Decision made-no TNK candidate given unclear imaging findings, stroke versus metastatic lesions  Keppra loaded at the ED and given aspirin load      Plan  Appreciate neurology consult  Stroke pathway  Follow-up MRI with and without contrast  Permissive hypertension less than 180  Continue aspirin  Consider steroids for vasogenic edema with midline shift  Continue with Keppra 750 twice daily  Possible neurosurgery consult if metastatic disease is confirmed  Possible radiation oncology consulted metastatic disease confirmed        Malignant neoplasm of upper lobe, right bronchus or lung (HCC)  Assessment & Plan  Patient with history of right primary lung adenocarcinoma (non-small cell carcinoma) diagnosed in 2024  Patient is on concurrent chemoradiation with Taxol and carboplatin.  Last treatment 7/2  One radiation treatment given here.  7/3 CT C/A/P: interval progression of neoplastic disease -findings inconclusive  given patient is also on active treatment.  Plan is to get a PET/CT August 2024 to determine next treatment option possibly immunotherapy  Patient presents today with strokelike symptoms and CT head showing vasogenic edema and findings concerning for possible metastatic disease  Repeat MRI will determine need for neurosurgery/heme-onc/rad oncology      CKD (chronic kidney disease) stage 3, GFR 30-59 ml/min (HCC)  Assessment & Plan  Lab Results   Component Value Date    EGFR 50 07/29/2024    EGFR 56 07/29/2024    EGFR 61 07/15/2024    CREATININE 1.09 07/29/2024    CREATININE 1.00 07/29/2024    CREATININE 0.92 07/15/2024   At baseline,  Continue to monitor, avoid NSAIDs     Essential thrombocytosis (HCC)  Assessment & Plan  Was on Hydroxyurea- Now held  Follows up with heme-onc  Hold Hydroxyurea    * Hypertension  Assessment & Plan  Home regimen-none  Systolic blood pressure in the 160s    Plan  Permissive hypertension for possible stroke < 180           VTE Pharmacologic Prophylaxis: VTE Score: 7 High Risk (Score >/= 5) - Pharmacological DVT Prophylaxis Ordered: enoxaparin (Lovenox). Sequential Compression Devices Ordered.  Code Status: Level 1 - Full Code   Discussion with family: Patient declined call to .     Anticipated Length of Stay: Patient will be admitted on an inpatient basis with an anticipated length of stay of greater than 2 midnights secondary to Stroke like symptoms.    Chief Complaint:  abnormal balance , flashing light in right eye    History of Present Illness:  Ayla Nye is a 73 y.o. female with a PMH of non-small cell lung cancer on concurrent chemoradiation, hypertension who presents for abnormal balance, flushing monitoring the right eye.  Patient was apparently doing well and was doing some work at home.  At about 17 00 PM today she suddenly started having flushing sensation in the right eye, decreased peripheral vision in the right eye as well.  Also was experiencing dull  frontal headache, graded 5/10 no radiation no relieving or aggravating factor, denied associated photophobia, phonophobia, tremors or abnormal movements.  Did have nausea but no vomiting.  Patient also noticing she could not reach objects and it was hard for focus.  Felt her gait was off and was leaning towards her left side.  There is also concern for some memory issues and word finding which all started around the same time. Patient reports difficulty with finding familiar objects used on daily basis.  Patient endorses having similar symptoms of flashing light at the periphery of the right eye 2 weeks ago for which she saw ophthalmology.  Eye exam was unremarkable with no retinal pathology.  Symptoms spontaneously resolved  Patient arrived to the ED 1913, and was stroke alert.  NIH score 2  CTA head neck showed no lvo  CTH l 2 small areas of hypoattenuation in left superior frontal lobe and nodular area in the left posterior parietal region suggestive of vasogenic edema. Decision made not to give TNK given possibly underlying malignancy or metastatic disease.  Patient admitted under stroke pathway and also further evaluation for possible brain metastasis.            Review of Systems:  Review of Systems   Constitutional:  Negative for chills and fever.   HENT:  Negative for ear pain and sore throat.    Eyes:  Negative for pain and visual disturbance.   Respiratory:  Negative for cough and shortness of breath.    Cardiovascular:  Negative for chest pain and palpitations.   Gastrointestinal:  Negative for abdominal pain and vomiting.   Genitourinary:  Negative for dysuria and hematuria.   Musculoskeletal:  Negative for arthralgias and back pain.   Skin:  Negative for color change and rash.   Neurological:  Positive for headaches. Negative for dizziness, seizures, syncope, facial asymmetry, light-headedness and numbness.   All other systems reviewed and are negative.      Past Medical and Surgical History:   Past  Medical History:   Diagnosis Date    Allergic 2022    Allergic to neomiacin and cephalexin    Cataract Nov. 2023    Due to have durgery June 2024    Essential thrombocytosis (HCC)     controlled with medication    Hyperlipidemia     Hypertension     Mass in chest     Nodular goiter     Pneumonia Oct 16, 2023    Also  Feb 2024    Psoriasis     SIRS (systemic inflammatory response syndrome) (HCC) 06/22/2024       Past Surgical History:   Procedure Laterality Date    APPENDECTOMY      BREAST CYST EXCISION Right 2009    COLONOSCOPY  10/31/2018    DXA PROCEDURE (HISTORICAL)  04/21/2017    MAMMO (HISTORICAL)      8-24-18    MAMMO NEEDLE LOCALIZATION RIGHT (ALL INC) Right 07/13/2009    SKIN LESION EXCISION      bridge of nose       Meds/Allergies:  Prior to Admission medications    Medication Sig Start Date End Date Taking? Authorizing Provider   aspirin 81 MG tablet Take 81 mg by mouth daily    Yes Historical Provider, MD   Cholecalciferol (VITAMIN D3) 5000 units TABS 5,000 Units Daily    Yes Historical Provider, MD   diphenhydrAMINE (BENADRYL) 25 mg capsule Take 25 mg by mouth every 12 (twelve) hours as needed for itching    Yes Historical Provider, MD   Probiotic Product (PROBIOTIC DAILY PO) Take 1 capsule by mouth daily   Yes Historical Provider, MD   vitamin B-12 (VITAMIN B-12) 1,000 mcg tablet Take 1 tablet (1,000 mcg total) by mouth daily 9/14/23  Yes ZULEYMA Boland     I have reviewed home medications with patient personally.    Allergies:   Allergies   Allergen Reactions    Doxycycline Headache    Keflex [Cephalexin] Rash    Neomycin-Polymyxin-Dexameth Eye Swelling       Social History:  Marital Status:    Occupation:   Patient Pre-hospital Living Situation: Home  Patient Pre-hospital Level of Mobility: walks  Patient Pre-hospital Diet Restrictions: none  Substance Use History:   Social History     Substance and Sexual Activity   Alcohol Use Not Currently     Social History     Tobacco Use   Smoking  Status Former    Current packs/day: 1.00    Average packs/day: 1 pack/day for 58.6 years (58.6 ttl pk-yrs)    Types: Cigarettes    Start date:     Passive exposure: Past   Smokeless Tobacco Never   Tobacco Comments    Quit      Social History     Substance and Sexual Activity   Drug Use Never       Family History:  Family History   Problem Relation Age of Onset    Stroke Mother     Depression Mother             Hypertension Mother     Hearing loss Mother     Tuberculosis Father     Cancer Brother         lung    Tuberculosis Brother     Coronary artery disease Brother     Hypertension Brother     No Known Problems Daughter     No Known Problems Daughter     No Known Problems Maternal Grandmother     No Known Problems Maternal Grandfather     No Known Problems Paternal Grandmother     No Known Problems Paternal Grandfather     No Known Problems Maternal Aunt     No Known Problems Maternal Aunt     No Known Problems Maternal Aunt     No Known Problems Maternal Aunt     No Known Problems Paternal Aunt     Cancer Brother         Throat cancer       Physical Exam:     Vitals:   Blood Pressure: 131/79 (24 0458)  Pulse: 68 (24 0458)  Temperature: 98.5 °F (36.9 °C) (24)  Temp Source: Oral (24)  Respirations: 18 (24)  Weight - Scale: 47.1 kg (103 lb 13.4 oz) (24 0503)  SpO2: 96 % (24 0458)    Physical Exam  Vitals and nursing note reviewed.   Constitutional:       General: She is not in acute distress.     Appearance: She is well-developed.   HENT:      Head: Normocephalic and atraumatic.   Eyes:      Conjunctiva/sclera: Conjunctivae normal.   Cardiovascular:      Rate and Rhythm: Normal rate and regular rhythm.      Heart sounds: No murmur heard.  Pulmonary:      Effort: Pulmonary effort is normal. No respiratory distress.      Breath sounds: Normal breath sounds.   Abdominal:      Palpations: Abdomen is soft.      Tenderness: There is no abdominal  "tenderness.   Musculoskeletal:         General: No swelling.      Cervical back: Neck supple.   Skin:     General: Skin is warm and dry.      Capillary Refill: Capillary refill takes less than 2 seconds.   Neurological:      Mental Status: She is alert and oriented to person, place, and time.      GCS: GCS eye subscore is 4. GCS verbal subscore is 5. GCS motor subscore is 6.      Motor: No weakness, tremor or pronator drift.      Coordination: Coordination abnormal. Finger-Nose-Finger Test abnormal and Heel to [place] Test abnormal.      Comments: Patient is alert oriented x 3  No focal droop  Pupils equal reactive to light  Peripheral vision loss on the right side     Psychiatric:         Mood and Affect: Mood normal.          Additional Data:     Lab Results:  Results from last 7 days   Lab Units 07/29/24 2339 07/29/24 1958 07/29/24  0923   WBC Thousand/uL  --  4.67 3.79*   HEMOGLOBIN g/dL  --  9.7* 10.2*   HEMATOCRIT %  --  31.1* 32.9*   PLATELETS Thousands/uL 199 230 221   SEGS PCT %  --   --  77*   LYMPHO PCT %  --   --  9*   MONO PCT %  --   --  11   EOS PCT %  --   --  1     Results from last 7 days   Lab Units 07/29/24 1958 07/29/24  0923   SODIUM mmol/L 137 140   POTASSIUM mmol/L 4.1 4.5   CHLORIDE mmol/L 105 107   CO2 mmol/L 26 28   BUN mg/dL 22 18   CREATININE mg/dL 1.09 1.00   ANION GAP mmol/L 6 5   CALCIUM mg/dL 9.0 8.9   ALBUMIN g/dL  --  4.0   TOTAL BILIRUBIN mg/dL  --  0.33   ALK PHOS U/L  --  60   ALT U/L  --  7   AST U/L  --  14   GLUCOSE RANDOM mg/dL 93  --      Results from last 7 days   Lab Units 07/29/24 1958   INR  0.91     Results from last 7 days   Lab Units 07/29/24 1941   POC GLUCOSE mg/dl 100     No results found for: \"HGBA1C\"        Lines/Drains:  Invasive Devices       Peripheral Intravenous Line  Duration             Peripheral IV 07/29/24 Right Antecubital <1 day                        Imaging: Reviewed radiology reports from this admission including: CT head  CT stroke alert brain "   Final Result by Nelson Toscano MD (07/29 2037)         1. Small area of hypoattenuation in the left superior frontal lobe and larger area in the left posterior parietal region, suggestive of vasogenic edema. Findings are concerning for occult underlying metastases. Acute infarcts cannot be excluded however    infarcts in 2 vascular territories and with findings suggesting subacute infarcts, not agreement with the clinical timeline suggest stroke is less likely.      2. No intracranial hemorrhage.      Findings were directly discussed with Percy Montalvo  at    8:20 PM .      Workstation performed: [id number]         CTA stroke alert (head/neck)   Final Result by Nelson Toscano MD (07/29 2035)      No hemodynamically significant stenosis, dissection or occlusion of the carotid or vertebral arteries or major vessels of the [place]..               Findings were directly discussed with Percy Montalvo  at  8:29 PM  .      Workstation performed: [id number]         MRI brain w wo contrast    (Results Pending)       EKG and Other Studies Reviewed on Admission:   EKG: NSR. HR 94.    ** Please Note: This note has been constructed using a voice recognition system. **

## 2024-07-30 NOTE — ASSESSMENT & PLAN NOTE
1913 patient presents today with visual changes, flashing lights and wide-based gait  Cerebellar symptoms noted-Romanenko  And abnormal finger-nose test  1955 patient is a stroke alert NIHS score 2  CTA head and neck-no  LVO-  CT head  Small area of hypoattenuation in the left superior frontal lobe and larger area in the left posterior parietal region, suggestive of vasogenic edema. Findings are concerning for occult underlying metastases. Acute infarcts cannot be excluded however   infarcts in 2 vascular territories and with findings suggesting subacute infarcts, not agreement with the clinical timeline suggest stroke is less likely.     Also concern for possible seizure given right eye visual changes and occipital lesions in the brain    Decision made-no TNK candidate given unclear imaging findings, stroke versus metastatic lesions  Keppra loaded at the ED and given aspirin load    MRI: 1. Multiple supratentorial and infratentorial enhancing lesions with associated vasogenic edema, the largest of which is in the left occipital lobe. Findings are most consistent with metastases.  2. No acute infarct or midline shift.      Plan  Neurology consulted; following  Stroke pathway  Permissive hypertension less than 180  Continue aspirin  Started decadron (10mg loaded, followed by 4mg daily) for vasogenic edema with midline shift  Continue with Keppra 750 twice daily  Neurosurgery consult- no surgical intervention at this time.  Oncology & Radiation oncology consulted consulted recommendations appreciated

## 2024-07-30 NOTE — ASSESSMENT & PLAN NOTE
Lab Results   Component Value Date    EGFR 50 07/29/2024    EGFR 56 07/29/2024    EGFR 61 07/15/2024    CREATININE 1.09 07/29/2024    CREATININE 1.00 07/29/2024    CREATININE 0.92 07/15/2024

## 2024-07-30 NOTE — CONSULTS
Oncology Consult Note  Ayla Nye 73 y.o. female MRN: [id number]  Unit/Bed#: S -01 Encounter: [id number]      Presenting Complaint: Non-small cell lung cancer, now with brain metastasis    History of Presenting Illness: Ayla Nye is seen for initial consultation 7/29/2024 at the referral of No ref. provider found   73-year-old  female with history of adenocarcinoma of the lung stage IIIb (cT2b, N3, M0) primary in the right upper lobe of the lung status post concurrent weekly Taxol/carboplatin with radiation therapy on May 2024 and finished on 7/5/2024, the patient had change in mental status, imbalance, nausea admitted to the hospital on 7/29/2024, MRI of the brain showed multiple supratentorial and infratentorial enhancing lesion associated with vasogenic edema the largest lesion in the left occipital lobe measuring 2 x 1.4 cm with adjacent edema    Initiated on Decadron and radiation oncology consulted for radiation  The last CAT scan of the chest abdomen pelvis on 7/3/2024 showing 4.6 x 4.8 x 3.3 cm mass in the right upper lobe of the lung abuts the anterior and the anterior medial pleural surface with spiculated satellite lesions, 2.4 x 2.2 cm right paratracheal lymph node with central necrosis, 2.8 x 2 cm precarinal lymph node    NGS testing showed K-luly G12V mutation, PDL expression by TPS of 5%, TMB 16     Review of Systems - As stated in the HPI otherwise the fourteen point review of systems was negative.    Past Medical History:   Diagnosis Date    Allergic 2022    Allergic to neomiacin and cephalexin    Cataract Nov. 2023    Due to have durgery June 2024    Essential thrombocytosis (HCC)     controlled with medication    Hyperlipidemia     Hypertension     Mass in chest     Nodular goiter     Pneumonia Oct 16, 2023    Also  Feb 2024    Psoriasis     SIRS (systemic inflammatory response syndrome) (HCC) 06/22/2024       Social History     Socioeconomic History    Marital status:       Spouse name: None    Number of children: None    Years of education: None    Highest education level: None   Occupational History    None   Tobacco Use    Smoking status: Former     Current packs/day: 1.00     Average packs/day: 1 pack/day for 58.6 years (58.6 ttl pk-yrs)     Types: Cigarettes     Start date:      Passive exposure: Past    Smokeless tobacco: Never    Tobacco comments:     Quit 2010   Vaping Use    Vaping status: Never Used   Substance and Sexual Activity    Alcohol use: Not Currently    Drug use: Never    Sexual activity: Not Currently     Partners: Male     Birth control/protection: Post-menopausal   Other Topics Concern    None   Social History Narrative    None     Social Determinants of Health     Financial Resource Strain: Low Risk  (2023)    Overall Financial Resource Strain (CARDIA)     Difficulty of Paying Living Expenses: Not hard at all   Food Insecurity: No Food Insecurity (2024)    Hunger Vital Sign     Worried About Running Out of Food in the Last Year: Never true     Ran Out of Food in the Last Year: Never true   Transportation Needs: No Transportation Needs (2024)    PRAPARE - Transportation     Lack of Transportation (Medical): No     Lack of Transportation (Non-Medical): No   Physical Activity: Not on file   Stress: Not on file   Social Connections: Not on file   Intimate Partner Violence: Not on file   Housing Stability: Low Risk  (2024)    Housing Stability Vital Sign     Unable to Pay for Housing in the Last Year: No     Number of Times Moved in the Last Year: 0     Homeless in the Last Year: No       Family History   Problem Relation Age of Onset    Stroke Mother     Depression Mother             Hypertension Mother     Hearing loss Mother     Tuberculosis Father     Cancer Brother         lung    Tuberculosis Brother     Coronary artery disease Brother     Hypertension Brother     No Known Problems Daughter     No Known Problems  "Daughter     No Known Problems Maternal Grandmother     No Known Problems Maternal Grandfather     No Known Problems Paternal Grandmother     No Known Problems Paternal Grandfather     No Known Problems Maternal Aunt     No Known Problems Maternal Aunt     No Known Problems Maternal Aunt     No Known Problems Maternal Aunt     No Known Problems Paternal Aunt     Cancer Brother         Throat cancer       Allergies   Allergen Reactions    Doxycycline Headache    Keflex [Cephalexin] Rash    Neomycin-Polymyxin-Dexameth Eye Swelling         Current Facility-Administered Medications:     aspirin chewable tablet 81 mg, 81 mg, Oral, Daily, Kalpana Dang MD, 81 mg at 07/30/24 0918    atorvastatin (LIPITOR) tablet 40 mg, 40 mg, Oral, QPM, Kalpana Dang MD, 40 mg at 07/29/24 2345    [START ON 7/31/2024] dexamethasone (DECADRON) tablet 4 mg, 4 mg, Oral, Daily, Damaso Silva MD    enoxaparin (LOVENOX) subcutaneous injection 40 mg, 40 mg, Subcutaneous, Daily, Kalpana Dang MD, 40 mg at 07/30/24 0918    levETIRAcetam (KEPPRA) injection 750 mg, 750 mg, Intravenous, Q12H KRISS, Kalpana Dang MD, 750 mg at 07/30/24 0918      /67   Pulse 62   Temp 98.1 °F (36.7 °C) (Oral)   Resp 12   Ht 5' 2\" (1.575 m)   Wt 46.7 kg (103 lb)   SpO2 97%   BMI 18.84 kg/m²       General Appearance:    Alert, oriented        Eyes:    PERRL   Ears:    Normal external ear canals, both ears   Nose:   Nares normal, septum midline   Throat:   Mucosa moist. Pharynx without injection.    Neck:   Supple       Lungs:     Clear to auscultation bilaterally   Chest Wall:    No tenderness or deformity    Heart:    Regular rate and rhythm       Abdomen:     Soft, non-tender, bowel sounds +, no organomegaly           Extremities:   Extremities no cyanosis or edema       Skin:   no rash or icterus.    Lymph nodes:   Cervical, supraclavicular, and axillary nodes normal   Neurologic:   CNII-XII " intact, normal strength, sensation and reflexes     Throughout               Recent Results (from the past 48 hour(s))   CBC and differential    Collection Time: 07/29/24  9:23 AM   Result Value Ref Range    WBC 3.79 (L) 4.31 - 10.16 Thousand/uL    RBC 3.20 (L) 3.81 - 5.12 Million/uL    Hemoglobin 10.2 (L) 11.5 - 15.4 g/dL    Hematocrit 32.9 (L) 34.8 - 46.1 %     (H) 82 - 98 fL    MCH 31.9 26.8 - 34.3 pg    MCHC 31.0 (L) 31.4 - 37.4 g/dL    RDW 15.9 (H) 11.6 - 15.1 %    MPV 9.0 8.9 - 12.7 fL    Platelets 221 149 - 390 Thousands/uL    nRBC 0 /100 WBCs    Segmented % 77 (H) 43 - 75 %    Immature Grans % 1 0 - 2 %    Lymphocytes % 9 (L) 14 - 44 %    Monocytes % 11 4 - 12 %    Eosinophils Relative 1 0 - 6 %    Basophils Relative 1 0 - 1 %    Absolute Neutrophils 2.95 1.85 - 7.62 Thousands/µL    Absolute Immature Grans 0.02 0.00 - 0.20 Thousand/uL    Absolute Lymphocytes 0.34 (L) 0.60 - 4.47 Thousands/µL    Absolute Monocytes 0.40 0.17 - 1.22 Thousand/µL    Eosinophils Absolute 0.05 0.00 - 0.61 Thousand/µL    Basophils Absolute 0.03 0.00 - 0.10 Thousands/µL   Comprehensive metabolic panel    Collection Time: 07/29/24  9:23 AM   Result Value Ref Range    Sodium 140 135 - 147 mmol/L    Potassium 4.5 3.5 - 5.3 mmol/L    Chloride 107 96 - 108 mmol/L    CO2 28 21 - 32 mmol/L    ANION GAP 5 4 - 13 mmol/L    BUN 18 5 - 25 mg/dL    Creatinine 1.00 0.60 - 1.30 mg/dL    Glucose, Fasting 82 65 - 99 mg/dL    Calcium 8.9 8.4 - 10.2 mg/dL    AST 14 13 - 39 U/L    ALT 7 7 - 52 U/L    Alkaline Phosphatase 60 34 - 104 U/L    Total Protein 6.3 (L) 6.4 - 8.4 g/dL    Albumin 4.0 3.5 - 5.0 g/dL    Total Bilirubin 0.33 0.20 - 1.00 mg/dL    eGFR 56 ml/min/1.73sq m   LD,Blood    Collection Time: 07/29/24  9:23 AM   Result Value Ref Range     140 - 271 U/L   Magnesium    Collection Time: 07/29/24  9:23 AM   Result Value Ref Range    Magnesium 2.1 1.9 - 2.7 mg/dL   Fingerstick Glucose (POCT)    Collection Time: 07/29/24  7:41 PM  "  Result Value Ref Range    POC Glucose 100 65 - 140 mg/dl   Basic metabolic panel    Collection Time: 07/29/24  7:58 PM   Result Value Ref Range    Sodium 137 135 - 147 mmol/L    Potassium 4.1 3.5 - 5.3 mmol/L    Chloride 105 96 - 108 mmol/L    CO2 26 21 - 32 mmol/L    ANION GAP 6 4 - 13 mmol/L    BUN 22 5 - 25 mg/dL    Creatinine 1.09 0.60 - 1.30 mg/dL    Glucose 93 65 - 140 mg/dL    Calcium 9.0 8.4 - 10.2 mg/dL    eGFR 50 ml/min/1.73sq m   CBC and Platelet    Collection Time: 07/29/24  7:58 PM   Result Value Ref Range    WBC 4.67 4.31 - 10.16 Thousand/uL    RBC 3.03 (L) 3.81 - 5.12 Million/uL    Hemoglobin 9.7 (L) 11.5 - 15.4 g/dL    Hematocrit 31.1 (L) 34.8 - 46.1 %     (H) 82 - 98 fL    MCH 32.0 26.8 - 34.3 pg    MCHC 31.2 (L) 31.4 - 37.4 g/dL    RDW 16.2 (H) 11.6 - 15.1 %    Platelets 230 149 - 390 Thousands/uL    MPV 8.8 (L) 8.9 - 12.7 fL   Protime-INR    Collection Time: 07/29/24  7:58 PM   Result Value Ref Range    Protime 12.8 11.6 - 14.5 seconds    INR 0.91 0.84 - 1.19   APTT    Collection Time: 07/29/24  7:58 PM   Result Value Ref Range    PTT 26 23 - 37 seconds   HS Troponin 0hr (reflex protocol)    Collection Time: 07/29/24  7:58 PM   Result Value Ref Range    hs TnI 0hr 5 \"Refer to ACS Flowchart\"- see link ng/L   FLU/RSV/COVID - if FLU/RSV clinically relevant    Collection Time: 07/29/24  7:58 PM    Specimen: Nose; Nares   Result Value Ref Range    SARS-CoV-2 Negative Negative    INFLUENZA A PCR Negative Negative    INFLUENZA B PCR Negative Negative    RSV PCR Negative Negative   HS Troponin I 2hr    Collection Time: 07/29/24 11:39 PM   Result Value Ref Range    hs TnI 2hr 6 \"Refer to ACS Flowchart\"- see link ng/L    Delta 2hr hsTnI 1 <20 ng/L   Platelet count    Collection Time: 07/29/24 11:39 PM   Result Value Ref Range    Platelets 199 149 - 390 Thousands/uL    MPV 8.8 (L) 8.9 - 12.7 fL   Lipid Panel with Direct LDL reflex    Collection Time: 07/30/24  5:02 AM   Result Value Ref Range    " Cholesterol 196 See Comment mg/dL    Triglycerides 76 See Comment mg/dL    HDL, Direct 60 >=50 mg/dL    LDL Calculated 121 (H) 0 - 100 mg/dL   Hemoglobin A1c w/EAG Estimation    Collection Time: 07/30/24  5:02 AM   Result Value Ref Range    Hemoglobin A1C 5.3 Normal 4.0-5.6%; PreDiabetic 5.7-6.4%; Diabetic >=6.5%; Glycemic control for adults with diabetes <7.0% %     mg/dl   Echo complete w/ contrast if indicated    Collection Time: 07/30/24 11:46 AM   Result Value Ref Range    Triscuspid Valve Regurgitation Peak Gradient 26.0 mmHg    RAA A4C 10.7 cm2    LA Volume Index (BP) 17.9 mL/m2    MV Peak A Jf 0.89 m/s    MV stenosis pressure 1/2 time 103 ms    MV Peak E Jf 56 cm/s    E wave deceleration time 354 ms    E/A ratio 0.63     MV valve area p 1/2 method 2.14     TR Peak Jf 2.6 m/s    RVID d 4.4 cm    A4C EF 57 %    Tricuspid valve peak regurgitation velocity 2.57 m/s    Left ventricular stroke volume (2D) 25.00 mL    IVSd 1.00 cm    Tricuspid annular plane systolic excursion 1.90 cm    Ao root 3.20 cm    LVPWd 1.10 cm    LA size 3.4 cm    Asc Ao 3.5 cm    LA volume (BP) 26 mL    FS 31 28 - 44    LVIDS 2.20 cm    IVS 1 cm    LVIDd 3.20 cm    LA length (A2C) 4.10 cm    LEFT VENTRICLE SYSTOLIC VOLUME (MOD BIPLANE) 2D 15 mL    LV DIASTOLIC VOLUME (MOD BIPLANE) 2D 41 mL    Left Atrium Area-systolic Four Chamber 9.9 cm2    Left Atrium Area-systolic Apical Two Chamber 12.8 cm2    MV E' Tissue Velocity Lateral 6 cm/s    MV E' Tissue Velocity Septal 6 cm/s    LVSV, 2D 25 mL    BSA 1.44 m2    LV EF 60     Est. RA pres 3.0 mmHg    Right Ventricular Peak Systolic Pressure 29.00 mmHg         Echo complete w/ contrast if indicated    Result Date: 7/30/2024  Narrative:   Left Ventricle: Left ventricular cavity size is normal. Wall thickness is normal. The left ventricular ejection fraction is 60%. Systolic function is normal. Wall motion is normal. Diastolic function is normal.   Right Ventricle: Right ventricular  cavity size is mildly dilated. Systolic function is normal.   Atrial Septum: There is a patent foramen ovale.  Bidirectional shunting with bubbles visualized in the LV.   Mitral Valve: There is mild annular calcification.   Tricuspid Valve: There is mild regurgitation. The estimated right ventricular systolic pressure is 29.00 mmHg.     MRI brain w wo contrast    Result Date: 7/30/2024  Narrative: MRI BRAIN WITH AND WITHOUT CONTRAST INDICATION: Dizziness. COMPARISON: CT head 7/29/2024. TECHNIQUE: Multiplanar, multisequence imaging of the brain was performed before and after gadolinium administration. IV Contrast:  5 mL of Gadobutrol injection (SINGLE-DOSE) IMAGE QUALITY:   Diagnostic. FINDINGS: BRAIN PARENCHYMA: No acute infarct or midline shift. Multiple enhancing lesions are seen supratentorially and infratentorially, with associated vasogenic edema. Lesions as referenced below: 1. 2.0 x 1.4 cm lesion in the left occipital lobe on series 10, image 16 with adjacent vasogenic edema involving the left occipital lobe and extending into the left periatrial white matter, where there is mild mass effect on the adjacent lateral ventricle. There is an internal, diffusion restricting 8 mm component. 2. 4 mm enhancing lesion in the left paramedian frontal lobe on series 10, image 20 with mild adjacent mass effect and vasogenic edema and a small focus of susceptibility likely reflective of hemorrhage. 3. 3 mm lesion in the left paramedian parietal lobe on series 10, image 20 with mild mass effect and vasogenic edema. 4. 3 mm lesion in the midline cerebellum on series 10, image 9 with adjacent vasogenic edema which results in mild mass effect on the fourth ventricle. 5. 2 mm lesion in the left frontal lobe (series 11, image 129) VENTRICLES:  Normal for the patient's age. SELLA AND PITUITARY GLAND:  Normal. ORBITS: No acute abnormality. PARANASAL SINUSES: Essentially clear. VASCULATURE:  Evaluation of the major intracranial  vasculature demonstrates appropriate flow voids. CALVARIUM AND SKULL BASE: No acute abnormality. EXTRACRANIAL SOFT TISSUES: No acute abnormality.     Impression: 1. Multiple supratentorial and infratentorial enhancing lesions with associated vasogenic edema, the largest of which is in the left occipital lobe. Findings are most consistent with metastases. 2. No acute infarct or midline shift. The study was marked in EPIC for immediate notification. Workstation performed: [id number]     CT stroke alert brain    Result Date: 7/29/2024  Narrative: CT BRAIN - STROKE ALERT PROTOCOL INDICATION:   Stroke Alert. COMPARISON: 3/28/2024. TECHNIQUE:  CT examination of the brain was performed.  In addition to axial images, coronal reformatted images were created and submitted for interpretation. Radiation dose length product (DLP) for this visit:  912 mGy-cm .  This examination, like all CT scans performed in the [facility], was performed utilizing techniques to minimize radiation dose exposure, including the use of iterative reconstruction and automated exposure control. IMAGE QUALITY:  Diagnostic. FINDINGS: PARENCHYMA: Area of hypoattenuation in the left superior frontal region adjacent to the interhemispheric fissure with preservation of the cortex, possibly indicating vasogenic edema.. Periventricular and subcortical hypoattenuating foci consistent with microangiopathic disease. No acute intracranial hemorrhage or mass effect. VENTRICLES AND EXTRA-AXIAL SPACES: No hydrocephalus or extra-axial collection. VISUALIZED ORBITS: Intact. PARANASAL SINUSES: Clear. CALVARIUM AND EXTRACRANIAL SOFT TISSUES:   No lytic or blastic lesion or fracture.     Impression: 1. Small area of hypoattenuation in the left superior frontal lobe and larger area in the left posterior parietal region, suggestive of vasogenic edema. Findings are concerning for occult underlying metastases. Acute infarcts cannot be excluded however infarcts  in 2 vascular territories and with findings suggesting subacute infarcts, not agreement with the clinical timeline suggest stroke is less likely. 2. No intracranial hemorrhage. Findings were directly discussed with Percy Montalvo  at    8:20 PM . Workstation performed: [id number]     CTA stroke alert (head/neck)    Result Date: 7/29/2024  Narrative: CTA NECK AND BRAIN WITH AND WITHOUT CONTRAST INDICATION: Stroke Alert COMPARISON: 3/28/2024 TECHNIQUE:  Post contrast imaging was performed after administration of iodinated contrast through the neck and brain. Post contrast axial 0.625 mm images timed to opacify the arterial system.  3D rendering was performed on an independent workstation.   MIP reconstructions performed. Coronal and sagittal reconstructions were performed of the non contrast portion of the brain. Radiation dose length product (DLP) for this visit:  338 mGy-cm .  This examination, like all CT scans performed in the [facility], was performed utilizing techniques to minimize radiation dose exposure, including the use of iterative reconstruction and automated exposure control. IV Contrast:  85 mL of iohexol (OMNIPAQUE) IMAGE QUALITY:   Diagnostic FINDINGS: CTA NECK ARCH AND GREAT VESSELS: Mild atherosclerotic plaque in the aortic arch. No stenosis in the subclavian arteries. VERTEBRAL ARTERIES: Left vertebral artery arises directly from the aortic arch. No stenosis. RIGHT CAROTID: No stenosis.    No dissection. LEFT CAROTID: No stenosis.    No dissection. NASCET criteria was used to determine the degree of internal carotid artery diameter stenosis. CTA BRAIN: INTERNAL CAROTID ARTERIES: Minimal calcified plaque in the carotid siphons without hemodynamically significant stenosis. ANTERIOR CEREBRAL ARTERY CIRCULATION:  No stenosis or occlusion. MIDDLE CEREBRAL ARTERY CIRCULATION:  No stenosis or occlusion. DISTAL VERTEBRAL ARTERIES:  No stenosis or occlusion. BASILAR ARTERY:  No stenosis or  occlusion. POSTERIOR CEREBRAL ARTERIES: No stenosis or occlusion. VENOUS STRUCTURES: Patent dural venous sinuses. NON VASCULAR ANATOMY BONY STRUCTURES:  No acute osseous abnormality. SOFT TISSUES OF THE NECK: Subcentimeter cystic lesions in the thyroid. THORACIC INLET: Partially visualized mediastinal lymphadenopathy, stable. Partially visualized right upper lobe mass, not significantly changed.     Impression: No hemodynamically significant stenosis, dissection or occlusion of the carotid or vertebral arteries or major vessels of the [place].. Findings were directly discussed with Percy Montalvo  at  8:29 PM  . Workstation performed: [id number]     XR chest 2 views    Result Date: 7/3/2024  Narrative: XR CHEST PA & LATERAL DUAL ENERGY SUBTRACTION. INDICATION:  fever, hx of lung ca. COMPARISON: Chest CT 7/3/2024, CXR 6/22/2024, PET CT 3/22/2024. FINDINGS: No acute disease. Redemonstration of right upper lobe paramediastinal mass. No pneumothorax or pleural effusion. Unremarkable cardiomediastinal silhouette. Bones are unremarkable for age. Unremarkable upper abdomen.     Impression: No acute cardiopulmonary disease. Redemonstration of right upper lobe paramediastinal mass. Workstation performed: [id number]     CT chest abdomen pelvis w contrast    Result Date: 7/3/2024  Narrative: CT CHEST, ABDOMEN AND PELVIS WITH IV CONTRAST INDICATION: hx of lung cancer, fever, cough. Fever (102.9 degrees Fahrenheit), cough, recently had chemotherapy. COMPARISON: February 28, 2024 TECHNIQUE: CT examination of the chest, abdomen and pelvis was performed. Multiplanar 2D reformatted images were created from the source data. This examination, like all CT scans performed in the [facility], was performed utilizing techniques to minimize radiation dose exposure, including the use of iterative reconstruction and automated exposure control. Radiation dose length product (DLP) for this visit: 314 mGy-cm IV Contrast: 100  mL of iohexol (OMNIPAQUE) Enteric Contrast: Not administered. FINDINGS: CHEST LUNGS: There is an approximately 4.6 x 4.8 x 3.3 cm mass within the anteromedial aspect of the right upper lobe of the lung. This mass abuts the anterior and anteromedial pleural surface and the right hand aspect of the upper mediastinum. The mass demonstrates peripheral spiculation and areas of low density centrally, suggesting areas of necrosis. Just posterolateral to this mass there is a spiculated satellite lesion measuring approximately 1.6 cm. There is also a spiculated satellite lesion anterior and inferior to the dominant mass, measuring approximately 1.4 cm and abutting the anterior pleural surface (series 302 image 91); this satellite mass appears new compared with February 28, 2024. An ill-defined groundglass opacity in the left upper lobe of the lung measures approximately 2.5 cm, similar to the prior study. There is mild to moderate emphysema. There is mild bibasilar scarring versus atelectasis. Right apical scarring unchanged. PLEURA: Left pleural calcifications unchanged. No pleural effusions. No pneumothorax. HEART/GREAT VESSELS: Coronary artery disease. There is atherosclerosis of the thoracic aorta. No thoracic aortic aneurysm. MEDIASTINUM AND SUDEEP: There is a 2.4 x 2.2 cm right paratracheal node demonstrating low density centrally suggesting necrosis; this is larger compared with February 28, 2024. Additionally, there is a 2.8 x 2 cm precarinal node demonstrating low density centrally suggesting necrosis, also larger compared to the prior study. Necrotic appearing right hilar adenopathy measuring up to 2 cm. CHEST WALL AND LOWER NECK: Tiny thyroid nodules, measuring 6 mm or less. Incidental discovery of one or more thyroid nodule(s) measuring less than 1.5 cm and without suspicious features is noted in this patient who is above 35 years old; according to guidelines published in the February 2015 white paper on incidental  thyroid nodules in the Journal of the American College of Radiology (JACR), no further evaluation is recommended. ABDOMEN LIVER/BILIARY TREE: The liver is mildly enlarged, measuring approximately 19 cm craniocaudal dimension. There is mild hepatic steatosis. GALLBLADDER: No calcified gallstones. No pericholecystic inflammatory change. SPLEEN: Unremarkable. PANCREAS: Unremarkable. ADRENAL GLANDS: Unremarkable. KIDNEYS/URETERS: Small cysts and subcentimeter hypodensities too small to characterize. There is a 2 mm nonobstructing calculus in the upper pole right kidney. No hydronephrosis bilaterally. STOMACH AND BOWEL: No bowel obstruction. There is mild colonic diverticulosis without evidence of acute diverticulitis. APPENDIX: No findings to suggest appendicitis; reportedly, the patient has undergone prior appendectomy. ABDOMINOPELVIC CAVITY: No ascites. No pneumoperitoneum. No lymphadenopathy. VESSELS: Atherosclerosis. No abdominal aortic aneurysm. PELVIS REPRODUCTIVE ORGANS: Unremarkable for patient's age. URINARY BLADDER: Unremarkable. ABDOMINAL WALL/INGUINAL REGIONS: Unremarkable. BONES: No acute fracture or suspicious osseous lesion. Spinal degenerative changes.     Impression: There has been interval progression of neoplastic disease, as described above. Please see discussion. Mild hepatomegaly. Mild hepatic steatosis. Mild colonic diverticulosis without evidence of acute diverticulitis. No bowel obstruction. Atherosclerosis. Coronary artery disease. Other nonemergent findings as above. Workstation performed: [id number]     ECOG PS:1      Assessment and plan:  73-year-old  female who quit smoking 10 years ago was found to have stage IIIb adenocarcinoma of the lung primary in the right upper lobe of the lung with mediastinal, paratracheal lymphadenopathy, finished concurrent radiation therapy with weekly Taxol/carboplatin on 7/4/2024, now with brain metastasis disease above and below the tentorium proven  by MRI with vasogenic edema, scheduled for palliative radiation therapy to the brain    Patient to follow-up with Dr. Castro as an outpatient in 2 weeks to 3 weeks from discharge for reimaging and progression of disease    Discussed the case with Dr. Castro

## 2024-07-30 NOTE — ASSESSMENT & PLAN NOTE
Home regimen-none  Systolic blood pressure in the 160s    Plan  Permissive hypertension for possible stroke

## 2024-07-30 NOTE — ASSESSMENT & PLAN NOTE
Home regimen-none  Systolic blood pressure in the 160s    Plan  Permissive hypertension for possible stroke < 180

## 2024-07-30 NOTE — ASSESSMENT & PLAN NOTE
Patient complains of right-sided bright flashing lights that obscures her visioin. Disturbances are transient and episodic first occurring 2 weeks ago however at that time which shortness duration only lasting about 5 minutes. Seen by outpatient optho and reports normal ocular examination. Day prior to presentation increased in both intensity and duration of the + visual phenomenon lasting 2 to 3 hours. Right visual field disturbance described as bright flashing lights. Although patient describes that it is only monocular and only present in her right eye suspect visual disturbance is actually binocular and in both right visual fields. Given the recurrent episodic nature of her symptoms concern for possible epileptic cause.   Plan as above

## 2024-07-30 NOTE — CONSULTS
Inpatient Consultation - Radiation Oncology   Ayla Nye 73 y.o. female MRN: [id number]  Unit/Bed#: S -01 Encounter: [id number]. Consult Requested by Kaylan Cardona*    Inpatient consult to Radiation Oncology  Consult performed by: Honey Gamboa MD  Consult ordered by: Kalpana Dang MD      Reason for Consult / Principal Problem: Brain metastases  Hx and PE limited by: None    Assessment & Plan   Ayla Nye is a 73 y.o. female known to radiation oncology for [id number] (IIIB) NSCLC of the right upper lobe, s/p concurrent chemoRT completing on 7/5/24. She is now admitted at [facility] with dizziness/disorientation and imbalance.  CT head on 7/29/2024 and neurology consult suggested less likelihood of stroke.  MRI brain on 7/30/2024 demonstrated at least five supra- and infratentorial enhancing lesions compatible with metastases.  Neurosurgery, medical oncology, and radiation oncology consulted.    Treatment options from a radiation therapy standpoint include SRS/SRT or whole brain radiation ± hippocampal avoidance. Given the small size and relatively small number of metastases, SRS would be the preferred treatment approach. I discussed with Dr. Norwood with consensus for SRS.    Possible short- and long-term side effects inclide but are not limited to, fatigue, headache, nausea, alopecia, vomiting, injury to the cranial nerves and/or visual structures including the optic nerve and chiasm, a low risk of neurocognitive effects, neurologic deficits, radionecrosis which may require treatment with steroids or surgical resection, hearing loss, stroke-like symptoms, and a low risk of radiation-induced secondary malignancy. Even with appropriate treatment, there is a risk of progression.  One some occasions, additional/other tumors may develop requiring re-evaluation.     Neurosurgical and medical oncology consultation is awaited   The patient is agreeable to proceed with SRS and  informed consent was signed.  SRS is performed as an outpatient at [facility], and will be arranged  She does not need to remain inpatient for radiation purposes  IV contrast form to be prepared  Neuro NN engaged to assist with scheduling.  Recommend continued management and stabilization per primary team and neurology    Honey Gamboa MD  Department of Radiation Oncology  [facility]    History of Present Illness   HPI: Ayla Nye is a 73 y.o. year old female who presents with neurologic symptoms and imaging as detailed below:    MRI Brain 7/30/24  BRAIN PARENCHYMA: No acute infarct or midline shift.     Multiple enhancing lesions are seen supratentorially and infratentorially, with associated vasogenic edema. Lesions as referenced below:     1. 2.0 x 1.4 cm lesion in the left occipital lobe on series 10, image 16 with adjacent vasogenic edema involving the left occipital lobe and extending into the left periatrial white matter, where there is mild mass effect on the adjacent lateral   ventricle. There is an internal, diffusion restricting 8 mm component.     2. 4 mm enhancing lesion in the left paramedian frontal lobe on series 10, image 20 with mild adjacent mass effect and vasogenic edema and a small focus of susceptibility likely reflective of hemorrhage.     3. 3 mm lesion in the left paramedian parietal lobe on series 10, image 20 with mild mass effect and vasogenic edema.     4. 3 mm lesion in the midline cerebellum on series 10, image 9 with adjacent vasogenic edema which results in mild mass effect on the fourth ventricle.     5. 2 mm lesion in the left frontal lobe (series 11, image 129)    Prior radiation: yes, chemoRT to lung  Pacemaker: no    Today she feels tired and has low energy. She reports improvement since admission. She is accompanied by her ex  and daughter.     Review of Systems   Constitutional:  Positive for fatigue.   HENT: Negative.     Eyes:  Negative.    Respiratory: Negative.     Cardiovascular: Negative.    Gastrointestinal: Negative.    Genitourinary: Negative.    Musculoskeletal: Negative.    Neurological:  Positive for dizziness and weakness. Negative for speech difficulty and light-headedness.   Psychiatric/Behavioral:  Negative for confusion.         Historical Information   Oncology History   Malignant neoplasm of upper lobe, right bronchus or lung (HCC)   2024 Initial Diagnosis    Primary lung adenocarcinoma, right (HCC)     3/26/2024 Biopsy    A-C. Lymph Node, Level 4R :    - Metastatic non-small cell carcinoma, most compatible with a primary lung adenocarcinoma; see note.       D-F. Lymph Node, Level 10R:    - Metastatic non-small cell carcinoma; see note.       G-I. Lymph Node, Level 4L:    - Metastatic non-small cell carcinoma; see note.       4/15/2024 -  Cancer Staged    Staging form: Lung, AJCC 8th Edition  - Clinical stage from 4/15/2024: Stage IIIB (cT2b, cN3, cM0) - Signed by Rogelio Beebe DO on 4/15/2024       5/23/2024 -  Chemotherapy    alteplase (CATHFLO), 2 mg, Intracatheter, Every 1 Minute as needed, 6 of 6 cycles  CARBOplatin (PARAPLATIN) IVPB (GOG AUC DOSING), 130.4 mg, Intravenous, Once, 6 of 6 cycles  Administration: 130.4 mg (5/23/2024), 144.8 mg (5/30/2024), 138.4 mg (6/6/2024), 144.8 mg (6/13/2024), 132 mg (6/21/2024)  PACLItaxel (TAXOL) chemo IVPB, 45 mg/m2 = 67.2 mg (90 % of original dose 50 mg/m2), Intravenous, Once, 6 of 6 cycles  Dose modification: 45 mg/m2 (original dose 50 mg/m2, Cycle 1, Reason: Anticipated Tolerance)  Administration: 67.2 mg (5/23/2024), 67.2 mg (5/30/2024), 67.2 mg (6/6/2024), 67.2 mg (6/13/2024), 67.2 mg (6/21/2024)       Past Medical History:   Diagnosis Date    Allergic 2022    Allergic to neomiacin and cephalexin    Cataract Nov. 2023    Due to have durgery June 2024    Essential thrombocytosis (HCC)     controlled with medication    Hyperlipidemia     Hypertension     Mass in  chest     Nodular goiter     Pneumonia Oct 16, 2023    Also  2024    Psoriasis     SIRS (systemic inflammatory response syndrome) (HCC) 2024     Past Surgical History:   Procedure Laterality Date    APPENDECTOMY      BREAST CYST EXCISION Right     COLONOSCOPY  10/31/2018    DXA PROCEDURE (HISTORICAL)  2017    MAMMO (HISTORICAL)      18    MAMMO NEEDLE LOCALIZATION RIGHT (ALL INC) Right 2009    SKIN LESION EXCISION      bridge of nose     Family History   Problem Relation Age of Onset    Stroke Mother     Depression Mother             Hypertension Mother     Hearing loss Mother     Tuberculosis Father     Cancer Brother         lung    Tuberculosis Brother     Coronary artery disease Brother     Hypertension Brother     No Known Problems Daughter     No Known Problems Daughter     No Known Problems Maternal Grandmother     No Known Problems Maternal Grandfather     No Known Problems Paternal Grandmother     No Known Problems Paternal Grandfather     No Known Problems Maternal Aunt     No Known Problems Maternal Aunt     No Known Problems Maternal Aunt     No Known Problems Maternal Aunt     No Known Problems Paternal Aunt     Cancer Brother         Throat cancer     Social History   Social History     Substance and Sexual Activity   Alcohol Use Not Currently     Social History     Substance and Sexual Activity   Drug Use Never     Social History     Tobacco Use   Smoking Status Former    Current packs/day: 1.00    Average packs/day: 1 pack/day for 58.6 years (58.6 ttl pk-yrs)    Types: Cigarettes    Start date:     Passive exposure: Past   Smokeless Tobacco Never   Tobacco Comments    Quit        Meds/Allergies   all current active meds have been reviewed  Allergies   Allergen Reactions    Doxycycline Headache    Keflex [Cephalexin] Rash    Neomycin-Polymyxin-Dexameth Eye Swelling       Objective     Intake/Output Summary (Last 24 hours) at 2024 0922  Last data  filed at 7/30/2024 0838  Gross per 24 hour   Intake 240 ml   Output 0 ml   Net 240 ml     Invasive Devices       Peripheral Intravenous Line  Duration             Peripheral IV 07/29/24 Right Antecubital <1 day                    Physical Exam  General Appearance:  Alert, cooperative, no distress, appears stated age  Lungs: Respirations unlabored, no cyanosis, able to speak in complete sentences without dyspnea.  Abdomen: Non-distended  Extremities: No cyanosis or edema  Skin: No generalized rash or dermatitis  Neurologic: ANOx3, CNII-XII grossly intact. speech and cognition intact., strength 5/5 in all extremities. Gait exam deferred.    Lab Results: I have personally reviewed pertinent reports.    Imaging Studies: I have personally reviewed pertinent films in PACS  EKG, Pathology, and Other Studies: I have personally reviewed pertinent reports.      Code Status: Level 1 - Full Code  Advance Directive and Living Will:      Power of : Yes  POLST:      Counseling / Coordination of Care  Total Time Spent  40 minutes spent reviewing EMR in preparation for visit, with the patient, coordination with other providers, and documentation.

## 2024-07-30 NOTE — ASSESSMENT & PLAN NOTE
1913 patient presents today with visual changes, flashing lights and wide-based gait  Cerebellar symptoms noted-Romanenko  And abnormal finger-nose test  1955 patient is a stroke alert NIHS score 2  CTA head and neck-no  LVO-  CT head  Small area of hypoattenuation in the left superior frontal lobe and larger area in the left posterior parietal region, suggestive of vasogenic edema. Findings are concerning for occult underlying metastases. Acute infarcts cannot be excluded however   infarcts in 2 vascular territories and with findings suggesting subacute infarcts, not agreement with the clinical timeline suggest stroke is less likely.     Also concern for possible seizure given right eye visual changes and occipital lesions in the brain    Decision made-no TNK candidate given unclear imaging findings, stroke versus metastatic lesions  Keppra loaded at the ED and given aspirin load      Plan  Appreciate neurology consult  Stroke pathway  Follow-up MRI with and without contrast  Permissive hypertension less than 180  Continue aspirin  Consider steroids for vasogenic edema with midline shift  Continue with Keppra 750 twice daily  Possible neurosurgery consult if metastatic disease is confirmed  Possible radiation oncology consulted metastatic disease confirmed

## 2024-07-30 NOTE — ASSESSMENT & PLAN NOTE
73 F with pertinent PMH of NSCLC (Dx 3/2024) status post concurrent treatment with chemoradiation (paclitaxel, carboplatin), JAK2+ Essential Thrombocytosis,  HTN, HLD, Psoriasis, and cataracts who presented on 7/29 for evaluation of acute onset R sided dis coordination, imbalance, frontal headache, and R visual field disturbance. Stroke alert called at 1955 (7/29), LKN 1700 (7/29). /94, FSBG 100, NIHSS 2 (1+ Limb Ataxia,??). NC CTH revealed small area of hypoattenuation in the L superior frontal lobe and larger area in the L posterior parietal region suggestive of vasogenic edema. CTA H/N did not show any hemodynamically significant stenosis, or large vessel occlusion. Given findings on CTH with concerns for malignancy/edema versus subacute stroke TNK was contraindicated due to risks overweighs benefits. She was found not to be a candidate for thrombectomy given absence of IR target. Given patient's clinical hx of R visual field disturbance and L occipital lesion noted on NC CTH concern for that patient's symptomatology was secondary to seizure activity and was given a 1 g levetiracetam load and started on 750 mg maintenance dosing. Patient was admitted along the stroke pathway and was loaded with aspirin 325 mg x 1  - Home AP: Aspirin 81 mg QD    Workup:  MRI brain + deonte: Revealed multiple supratentorial and infratentorial enhancing lesions with associated vasogenic edema the largest within the left occipital lobe consistent with metastases.  TTE: LVEF 60%, PFO with bi-directional shunting visualized in LV, non-dilated atria  Labs: Hemoglobin A1c 5.3,   Routine EEG: Pending ***    Impression: Patient with NSCLC diagnosed this year recently finishing concurrent chemo and radiation on 7/4/24 presenting with R instability and incoordination with transient episodes of R visual field obscurities. MR revealing multiple infratentorial and supratentorial metastatic lesions. Motor sxs resolved however continued  to episodic visual disturbances with right sided persistent homomomous hemianopia localizing to the L occipital lobe where the largest of her lesions is located. Plan for whole brain radiation with rad/onc.     Plan:  Frequent Neuro Checks - STAT CTH for acute changes in exam  Seizure precautions   Can discontinue stroke pathway. Pursue can pursue normotension, permissive HTN not needed. Can continue home ASA  AEDs: Continue Keppra 750 mg BID  Routine EEG, pending  Steroids: Decadron 10 mg x1 followed by 4 mg QD, will defer ongoing steroids to Heme/Onc and Rad/Onc  Consults: Hematology oncology, neurosurgery, and radiation/oncology   Plan for radiation therapy with SRS by radiology oncology as an outpatient at [facility] in the upcoming weeks  Follow-up with Dr. Castro (Onc) in 2-3 weeks from d/c for reimaging and progression of disease  PT/OT/ST  Outpatient follow-up with neurology and 4 to 6 weeks

## 2024-07-30 NOTE — OCCUPATIONAL THERAPY NOTE
Occupational Therapy Cancellation Note     Patient Name: Ayla Nye  Today's Date: 7/30/2024 07/30/24 6927   Note Type   Note type Cancelled Session   Cancel Reasons Other   Additional Comments OT consult received and chart reviewed. Attempted to see patient for OT evaluation. Pt currently eating lunch w/ family + extended family present at bedside. Will hold and f/u as able and appropriate.     Marta Clifton MS OTR/L   [place] Licensure# [id number]            10/3/2023  I, Cuauhtemoc Correia MD, saw and evaluated the patient. I have discussed the patient with the resident/non-physician practitioner and agree with the resident's/non-physician practitioner's findings, Plan of Care, and MDM as documented in the resident's/non-physician practitioner's note, except where noted. All available labs and Radiology studies were reviewed. I was present for key portions of any procedure(s) performed by the resident/non-physician practitioner and I was immediately available to provide assistance. At this point I agree with the current assessment done in the Emergency Department.   I have conducted an independent evaluation of this patient a history and physical is as follows:see h and p above   ED Course         Critical Care Time  Procedures

## 2024-07-30 NOTE — CONSULTS
NEUROLOGY RESIDENCY CONSULT NOTE     Name: Ayla Nye   Age & Sex: 73 y.o. female   MRN: [id number]  Unit/Bed#: S -01   Encounter: [id number]  Length of Stay: 1    Recommendations for outpatient neurological follow up have yet to be determined.    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 Stroke alert called at 1955 (7/29), LKN 1700 (7/29). /94, FSBG 100, NIHSS 2 (1+ Limb Ataxia,??). NC CTH revealed small area of hypoattenuation in the L superior frontal lobe and larger area in the L posterior parietal region suggestive of vasogenic edema. CTA H/N did not show any hemodynamically significant stenosis, or large vessel occlusion. Given findings on CTH with concerns for malignancy/edema versus subacute stroke TNK was contraindicated due to risks overweighs benefits. She was found not to be a candidate for thrombectomy given absence of IR target. Given patient's clinical history right visual field disturbance and L occipital lesion noted on NC CTH concern for that patient's symptomatology was secondary to seizure activity and was given a 1 g levetiracetam load and started on 750 mg maintenance dosing. Patient was admitted along the stroke pathway and was loaded with aspirin 325 mg x 1  - Home antiplatelet regimen: Aspirin 81 mg QD    Workup:  MRI brain + deonte: Revealed multiple supratentorial and infratentorial enhancing lesions with associated vasogenic edema the largest within the left occipital lobe consistent with metastases.  TTE: Pending  Labs: Hemoglobin A1c No results in last 5 years (No results in  last 5 years),  (7/30/2024)    Impression: Pt with known NSCLC s/p completion of chemoradiation earlier this month presenting with R sided instability/incoordination with transient episodic R visual field obscurities. MR imaging revealing multiple infra/supratentorial enhancing lesions consistent with metastases. R sided motor symptoms resolved but continues to have episodic visual disturbances with examination consistent with right sided homomomous hemianopia localizing to the L occipital lobe where the largest of her lesions is located.     Plan:  Frequent Neuro Checks - STAT CTH for acute changes in exam  Seizure precautions   Can discontinue stroke pathway. Pursue can pursue normotension, permissive HTN not needed. Can continue home ASA  AEDs: Continue Keppra 750 mg BID  Routine EEG, pending  Steroids: Decadron 10 mg x1 followed by 4 mg QD  Consults: Neurosurgery, Radiation Oncology - appreciate recs  PT/OT/ST    Visual disturbance  Assessment & Plan  Patient complains of right-sided bright flashing lights that obscures her visioin. Disturbances are transient and episodic first occurring 2 weeks ago however at that time which shortness duration only lasting about 5 minutes. Seen by outpatient optho and reports normal ocular examination. Day prior to presentation increased in both intensity and duration of the + visual phenomenon lasting 2 to 3 hours. Right visual field disturbance described as bright flashing lights. Although patient describes that it is only monocular and only present in her right eye suspect visual disturbance is actually binocular and in both right visual fields. Given the recurrent episodic nature of her symptoms concern for possible epileptic cause.   Plan as above      SUBJECTIVE   Reason for Consult / Principal Problem: Strokelike symptoms  Hx and PE limited by: None    HPI:73 F with pertinent PMH of NSCLC diagnosed earlier this year treated with chemoradiation (paclitaxel,  carboplatin) which was completed on July 5th, JAK2+ Essential Thrombocytosis,  HTN, HLD, Psoriasis, and cataracts who presented on 7/29 for evaluation of gait instability, difficulty with dexterity, confusion, and visual disturbances. Patient reports she was in her normal state of health yesterday and was able to mow the grass however upon completing her yard work when she attempted to stand up she began experiencing multiple neurological complaints which included right sided instability resulting in truncal leaning to the L, RUE hand eye incoordination, R sided visual disturbances, and acute onset pressure like frontal headache. She also notes that she experienced cognitive difficulties noing that she had trouble remember where she place her objects she frequently uses like her glassess which is unlike her self.  Patient describes headache as pressure like sensation location to the bifrontal region without associated radiation. She denies photophobia, osmophobia, lacrimation, or additional symptoms outside above mention sxs. She reports she does not typically get headaches. Initial onset severity 5/10 which gradually has improved since it started. Of note 2 weeks ago she had a similar visual disturbance that was similar to what she experienced PTA however was shorter in duration lasting only 5 minutes with a second episode 2 hours later of similar duration that spontaneously resolved. Was seen by her opthmologist and was told her eye exam was unremarkable and no retinal pathology was detected. These episodes of visual obscurities were not accompanied by a headache or other neurological symptoms.     Stroke alert called at 1955 (7/29), LKN 1700 (7/29). /94, FSBG 100, NIHSS 2 (1+ Limb Ataxia,??). NC CTH revealed small area of hypoattenuation in the L superior frontal lobe and larger area in the L posterior parietal region suggestive of vasogenic edema. CTA H/N did not show any hemodynamically significant  stenosis, dissection, or occlusion of the carotid or vertebral arteries or any major vessels of the [place]. Patient was found not to be a candidate for thrombolytics with TNK d/t concern for possible metastatic lesion versus acute stroke on NC CTH. Was not a candidate for thrombectomy given absence of IR target.     Given patient's clinical history right visual field disturbance and L occipital lesion noted on NC CTH concern for that patient's symptomatology was secondary to seizure activity and was given a 1 g levetiracetam load and started on 750 mg maintenance dosing. Patient was admitted along the stroke pathway and was loaded with aspirin 325 mg x 1 and started on atorvastatin 40 mg QHS. Patient was admitted to the medical service for further workup and evaluation. Overnight patient had additional imaging consisted of MRI brain with gadolinium which revealed multiple supratentorial and infratentorial enhancing lesions with associated vasogenic edema the largest within the left occipital lobe that was most consistent with metastases.    This morning patient reports that she no longer has RUE incoordination. Did have an episode of transient visual flashing disturbance that resolved spontaneously. Ongoing frontal headache 3/10. No new complaints.      Inpatient consult to Neurology  Consult performed by: Keith Charles DO  Consult ordered by: Manisha Fuentes DO      Historical Information   Past Medical History:   Diagnosis Date    Allergic 2022    Allergic to neomiacin and cephalexin    Cataract Nov. 2023    Due to have durgery June 2024    Essential thrombocytosis (HCC)     controlled with medication    Hyperlipidemia     Hypertension     Mass in chest     Nodular goiter     Pneumonia Oct 16, 2023    Also  Feb 2024    Psoriasis     SIRS (systemic inflammatory response syndrome) (HCC) 06/22/2024     Past Surgical History:   Procedure Laterality Date    APPENDECTOMY      BREAST CYST EXCISION Right      COLONOSCOPY  10/31/2018    DXA PROCEDURE (HISTORICAL)  2017    MAMMO (HISTORICAL)      8-24-18    MAMMO NEEDLE LOCALIZATION RIGHT (ALL INC) Right 2009    SKIN LESION EXCISION      bridge of nose     Social History   Social History     Substance and Sexual Activity   Alcohol Use Not Currently     Social History     Substance and Sexual Activity   Drug Use Never     E-Cigarette/Vaping    E-Cigarette Use Never User      E-Cigarette/Vaping Substances    Nicotine No     THC No     CBD No     Flavoring No     Other No     Unknown No      Social History     Tobacco Use   Smoking Status Former    Current packs/day: 1.00    Average packs/day: 1 pack/day for 58.6 years (58.6 ttl pk-yrs)    Types: Cigarettes    Start date:     Passive exposure: Past   Smokeless Tobacco Never   Tobacco Comments    Quit      Family History:   Family History   Problem Relation Age of Onset    Stroke Mother     Depression Mother             Hypertension Mother     Hearing loss Mother     Tuberculosis Father     Cancer Brother         lung    Tuberculosis Brother     Coronary artery disease Brother     Hypertension Brother     No Known Problems Daughter     No Known Problems Daughter     No Known Problems Maternal Grandmother     No Known Problems Maternal Grandfather     No Known Problems Paternal Grandmother     No Known Problems Paternal Grandfather     No Known Problems Maternal Aunt     No Known Problems Maternal Aunt     No Known Problems Maternal Aunt     No Known Problems Maternal Aunt     No Known Problems Paternal Aunt     Cancer Brother         Throat cancer     Meds/Allergies   current meds:   Current Facility-Administered Medications   Medication Dose Route Frequency    aspirin chewable tablet 81 mg  81 mg Oral Daily    atorvastatin (LIPITOR) tablet 40 mg  40 mg Oral QPM    [START ON 2024] dexamethasone (DECADRON) tablet 4 mg  4 mg Oral Daily    enoxaparin (LOVENOX) subcutaneous injection 40 mg   40 mg Subcutaneous Daily    levETIRAcetam (KEPPRA) injection 750 mg  750 mg Intravenous Q12H KRISS    and PTA meds:   Prior to Admission Medications   Prescriptions Last Dose Informant Patient Reported? Taking?   Cholecalciferol (VITAMIN D3) 5000 units TABS 2024 Self Yes Yes   Si,000 Units Daily    Probiotic Product (PROBIOTIC DAILY PO) 2024 Self Yes Yes   Sig: Take 1 capsule by mouth daily   aspirin 81 MG tablet 2024 Self Yes Yes   Sig: Take 81 mg by mouth daily    diphenhydrAMINE (BENADRYL) 25 mg capsule 2024 Self Yes Yes   Sig: Take 25 mg by mouth every 12 (twelve) hours as needed for itching    vitamin B-12 (VITAMIN B-12) 1,000 mcg tablet 2024 Self No Yes   Sig: Take 1 tablet (1,000 mcg total) by mouth daily      Facility-Administered Medications: None     Allergies   Allergen Reactions    Doxycycline Headache    Keflex [Cephalexin] Rash    Neomycin-Polymyxin-Dexameth Eye Swelling       Review of Systems   Constitutional:  Positive for chills.   HENT:  Negative for rhinorrhea, sinus pressure, sinus pain, tinnitus, trouble swallowing and voice change.    Eyes:  Positive for visual disturbance. Negative for photophobia, pain, discharge, redness and itching.   Cardiovascular:  Negative for chest pain, palpitations and leg swelling.   Gastrointestinal:  Positive for nausea. Negative for vomiting.   Musculoskeletal:  Positive for gait problem.   Neurological:  Positive for speech difficulty, weakness and headaches.   Psychiatric/Behavioral:  Positive for confusion and decreased concentration. Negative for agitation, behavioral problems and hallucinations.        OBJECTIVE     Patient ID: Ayla Nye is a 73 y.o. female.    Vitals:   Vitals:    24 0458 24 0503 24 0751 24 1117   BP: 131/79  116/67 116/67   BP Location:   Right arm    Pulse: 68  62 62   Resp:   12    Temp:   98.1 °F (36.7 °C)    TempSrc:   Oral    SpO2: 96%  97%    Weight:  47.1 kg (103 lb 13.4  "oz)  46.7 kg (103 lb)   Height:    5' 2\" (1.575 m)      Body mass index is 18.84 kg/m².     Intake/Output Summary (Last 24 hours) at 2024 1218  Last data filed at 2024 0900  Gross per 24 hour   Intake 240 ml   Output 350 ml   Net -110 ml       Temperature:   Temp (24hrs), Av.4 °F (36.9 °C), Min:98.1 °F (36.7 °C), Max:98.5 °F (36.9 °C)    Temperature: 98.1 °F (36.7 °C)    Invasive Devices:   Invasive Devices       Peripheral Intravenous Line  Duration             Peripheral IV 24 Right Antecubital <1 day                    GENERAL EXAM:  Constitutional: Not in acute distress. Not ill-appearing, toxic-appearing or diaphoretic.   HENT: Normocephalic and atraumatic. Nose and Ears normal.   Eyes: No scleral icterus. No discharge.   Cardiovascular:  Distal extremities warm without palpable edema or tenderness, no observed significant swelling.   Pulmonary: Pulmonary effort is normal. Not in respiratory distress  Musculoskeletal: No swelling or deformity.  Psychiatric: Normal behavior and appropriate affect     NEUROLOGIC  EXAM:  Mental Status: Alertness: alert, Orientation: time, person, place, city, president, month, year but not day, Speech/Language fluent, clear, coherent, Commands: Can follow multistep commands  Cranial Nerves:  I Not tested   II R Homonomous Hemianopia   II, Ill,  IV, VI Pupils equal, round, reactive to light and accommodation  EOM full and intact   V facial sensation was normal and symmetrical   VII facial symmetry equal   VIII Normal hearing to speech   IX, X Normal Palatal Elevation, No Uvular Deviation   XI Shoulder shrug and head turn is normal   XII Midline tongue protrusion   Motor: No pronator drift, No atrophy or muscle wasting, Normal tone, No Involuntary Movements, and No tremors appreciated  Arm Right  Left Leg Right  Left   Deltoid 5/5 5/5 lliopsoas 5/5 4/5   Biceps 5/5 5/5 Quads 5/5 4/5   Triceps 5/5 5/5 Hamstrings 5/5 5/5   Wrist Extension 5/5 5/5 Ankle Dorsi " Flexion 5/5 5/5   Wrist Flexion 5/5 5/5 Ankle Plantar Flexion 5/5 5/5   Reflexes: No clonus, no Smith's, no cross abductors, toes down     BICEP   TRICEPS   BRACHIO   PATELLAR   ACHILLES   RIGHT 2+ 2+ 2+ 2+ 2+   LEFT 2+ 2+ 2+ 2+ 2+   Sensory: Normal light touch sensation  Coordination: Cerebellar arm drift absent. Finger To Nose: Right Intact, Left Intact. Heel To Shin: Right Impaired, Left Intact  Station/Gait: Deferred d/t medical severity    LABORATORY DATA     Labs: I have personally reviewed pertinent reports.    Results from last 7 days   Lab Units 07/29/24  2339 07/29/24 1958 07/29/24  0923   WBC Thousand/uL  --  4.67 3.79*   HEMOGLOBIN g/dL  --  9.7* 10.2*   HEMATOCRIT %  --  31.1* 32.9*   PLATELETS Thousands/uL 199 230 221   SEGS PCT %  --   --  77*   MONO PCT %  --   --  11   EOS PCT %  --   --  1      Results from last 7 days   Lab Units 07/29/24 1958 07/29/24  0923   POTASSIUM mmol/L 4.1 4.5   CHLORIDE mmol/L 105 107   CO2 mmol/L 26 28   BUN mg/dL 22 18   CREATININE mg/dL 1.09 1.00   CALCIUM mg/dL 9.0 8.9   ALK PHOS U/L  --  60   ALT U/L  --  7   AST U/L  --  14     Results from last 7 days   Lab Units 07/29/24  0923   MAGNESIUM mg/dL 2.1          Results from last 7 days   Lab Units 07/29/24 1958   INR  0.91   PTT seconds 26               IMAGING & DIAGNOSTIC TESTING     Radiology Results: I have personally reviewed pertinent reports.    MRI brain w wo contrast   Final Result by Jose Huang MD (07/30 0718)      1. Multiple supratentorial and infratentorial enhancing lesions with associated vasogenic edema, the largest of which is in the left occipital lobe. Findings are most consistent with metastases.      2. No acute infarct or midline shift.      The study was marked in EPIC for immediate notification.      Workstation performed: [id number]         CT stroke alert brain   Final Result by Nelson Toscano MD (07/29 2037)         1. Small area of hypoattenuation in the left superior  frontal lobe and larger area in the left posterior parietal region, suggestive of vasogenic edema. Findings are concerning for occult underlying metastases. Acute infarcts cannot be excluded however    infarcts in 2 vascular territories and with findings suggesting subacute infarcts, not agreement with the clinical timeline suggest stroke is less likely.      2. No intracranial hemorrhage.      Findings were directly discussed with Percy Montalvo  at    8:20 PM .      Workstation performed: [id number]         CTA stroke alert (head/neck)   Final Result by Nelson Toscano MD (07/29 2035)      No hemodynamically significant stenosis, dissection or occlusion of the carotid or vertebral arteries or major vessels of the [place]..               Findings were directly discussed with Percy Montalvo  at  8:29 PM  .      Workstation performed: [id number]             Other Diagnostic Testing: I have personally reviewed pertinent reports.      ACTIVE MEDICATIONS     Current Facility-Administered Medications   Medication Dose Route Frequency    aspirin chewable tablet 81 mg  81 mg Oral Daily    atorvastatin (LIPITOR) tablet 40 mg  40 mg Oral QPM    [START ON 7/31/2024] dexamethasone (DECADRON) tablet 4 mg  4 mg Oral Daily    enoxaparin (LOVENOX) subcutaneous injection 40 mg  40 mg Subcutaneous Daily    levETIRAcetam (KEPPRA) injection 750 mg  750 mg Intravenous Q12H KRISS       Prior to Admission medications    Medication Sig Start Date End Date Taking? Authorizing Provider   aspirin 81 MG tablet Take 81 mg by mouth daily    Yes Historical Provider, MD   Cholecalciferol (VITAMIN D3) 5000 units TABS 5,000 Units Daily    Yes Historical Provider, MD   diphenhydrAMINE (BENADRYL) 25 mg capsule Take 25 mg by mouth every 12 (twelve) hours as needed for itching    Yes Historical Provider, MD   Probiotic Product (PROBIOTIC DAILY PO) Take 1 capsule by mouth daily   Yes Historical Provider, MD   vitamin B-12 (VITAMIN B-12) 1,000 mcg tablet  Take 1 tablet (1,000 mcg total) by mouth daily 9/14/23  Yes ZULEYMA Boland       CODE STATUS & ADVANCED DIRECTIVES     Code Status: Level 1 - Full Code  Advance Directive and Living Will:      Power of : Yes  POLST:        VTE Pharmacologic Prophylaxis: Enoxaparin (Lovenox)  VTE Mechanical Prophylaxis: sequential compression device    ======    I have discussed the patient's history, physical exam findings, assessment, and plan in detail with attending, Dr. ACEVEDO    Thank you for allowing me to participate in the care of your patient, Ayla Nye.    Keith Charles, DO  [facility] Neurology Residency, PGY-3

## 2024-07-30 NOTE — QUICK NOTE
Patient not seen, recommendation based on information provided by ED physician over the phone    Stroke alert called:  1956 on 7/29  Neurology response immediate  LKW:  5pm  NIHSS=2 for  bilateral ataxia with FNF per ED resident Dr. Judd Rouse     CTH showed new hypodense lesion at the L occipital and L frontal area   CTA: no LVO, no significant stenosis       IVtPA: TNK Decision:  Not a candidate. Patient's CTH showed new hypodense lesion at the L occipital and frontal area (comparing to MRI performed in 3/2024). Unclear nature, ?subacute stroke vs malignancy/edema vs other pathology. Will be contraindicated for TNK due to risks overweights benefits.  Thrombectomy: no, no LVO.    A/P   72 yo F with hx including HTN, lung cancer on chemo, who was stroke alerted for imbalance and bilateral ataxia. Reported LKN was 5pm.   Reported niHSS=2 for ?bilateral ataxia    Ataxia/imbalance  -possible brain mets/edema vs subacute stroke vs other etiology  -recommend to admit under stroke pathway  -allow permissive hypertension with SBP<180  -please load with ASA 324mg   -pending MRI brain wwo. If confirmed with malignancy, recommend to consult neurosx    R vision disturbance  -reported seeing bright light with R eye  -based on CTH finding of L occipital lesion, cannot rule out seizure. Recommend to load with Keppra 1g and continue with 750mg BID    Discussed with ED physician Dr. Fuentes at time of alert    Other comorbidity or condition, will defer to the primary team or ED physician

## 2024-07-30 NOTE — ASSESSMENT & PLAN NOTE
Patient with history of right primary lung adenocarcinoma (non-small cell carcinoma) diagnosed in 2024  Patient is on concurrent chemoradiation with Taxol and carboplatin.  Last treatment 7/2  One radiation treatment given here.  7/3 CT C/A/P: interval progression of neoplastic disease -findings inconclusive given patient is also on active treatment.  Plan is to get a PET/CT August 2024 to determine next treatment option possibly immunotherapy  Patient presents today with strokelike symptoms and CT head showing vasogenic edema and findings concerning for possible metastatic disease  Repeat MRI findings consistent with metastases, oncology and radiation oncology consulted and recommendations appreciated.

## 2024-07-30 NOTE — ED ATTENDING ATTESTATION
7/29/2024  IManisha, DO, saw and evaluated the patient. I have discussed the patient with the resident/non-physician practitioner and agree with the resident's/non-physician practitioner's findings, Plan of Care, and MDM as documented in the resident's/non-physician practitioner's note, except where noted. All available labs and Radiology studies were reviewed.  I was present for key portions of any procedure(s) performed by the resident/non-physician practitioner and I was immediately available to provide assistance.       At this point I agree with the current assessment done in the Emergency Department.  I have conducted an independent evaluation of this patient a history and physical is as follows:    ED Course   73 year old female presents to  ED for evaluation of difficulty with coordination and memory deficits.  Patient was finishing up cutting her grass when she noticed flashes of white light in her right eye.  She did experience these symptoms in the recent past but they were very transient.  Patient then was forgetful and she was unable to perform normal tasks around the house.  Around 5 pm she had difficulty ambulating and felt off balance.  She called her ex- who came to the house and confirmed that she was not walking normally.  She denies HA, weakness or numbness. Patient has lung cancer and recently completed chemo and radiation.    PmhX: NSC lung cancer, CKD, thrombocytosis, HTN    Physical exam: Patient is awake and alert, no acute distress.  Resting comfortably.  Pupils are equal and reactive.  Neck is supple without JVD.  Heart is regular rate and rhythm without ectopy.  Lungs are clear to auscultation.  Chest wall is nontender to palpation.  Abdomen is soft, nontender, nondistended with normal active bowel sounds.  No lower extremity edema.  No focal motor or sensory deficits.  Speech is clear and appropriate.  Widebased gait. Ataxia with finger to nose with both hands, left greater  than right.      See separate NIH SS    A/P:  73 year old female presents for evaluation of ataxia, confusion.  Differential diagnosis includes and is not limited to acute CVA - hemorrhagic or embolic, TIA, electrolyte abnormality, symptoms secondary to progression of underlying lung cancer    Stroke alert activated  - ct demonstrates area of edema needing further characterization.  Discussed with Dr. Montalvo, not a TNK candidate.  Will admit to [facility] on stroke pathway and plan for MRI.      Critical Care Time  CriticalCare Time    Date/Time: 7/29/2024 9:29 PM    Performed by: Manisha Fuentes DO  Authorized by: Manisha Fuentes DO    Critical care provider statement:     Critical care time (minutes):  35    Critical care time was exclusive of:  Separately billable procedures and treating other patients and teaching time    Critical care was necessary to treat or prevent imminent or life-threatening deterioration of the following conditions:  CNS failure or compromise    Critical care was time spent personally by me on the following activities:  Blood draw for specimens, obtaining history from patient or surrogate, discussions with consultants, development of treatment plan with patient or surrogate, evaluation of patient's response to treatment, examination of patient, ordering and performing treatments and interventions, ordering and review of laboratory studies, ordering and review of radiographic studies, re-evaluation of patient's condition and review of old charts    I assumed direction of critical care for this patient from another provider in my specialty: no

## 2024-07-30 NOTE — CASE MANAGEMENT
Case Management Assessment & Discharge Planning Note    Patient name Ayla Nye  Location S /S -01 MRN [id number]  : 1950 Date 2024       Current Admission Date: 2024  Current Admission Diagnosis:Hypertension   Patient Active Problem List    Diagnosis Date Noted Date Diagnosed    Brain mass 2024     Malignant neoplasm metastatic to brain (HCC) 2024     Visual disturbance 2024     SIRS (systemic inflammatory response syndrome) (HCC) 2024     Dehydration 2024     Moderate protein-calorie malnutrition (HCC) 2024     Bilateral leg pain 2024     Insomnia 2024     Malignant neoplasm of upper lobe, right bronchus or lung (HCC) 2024     CKD (chronic kidney disease) stage 3, GFR 30-59 ml/min (HCC) 2024     Age-related osteoporosis without current pathological fracture 2023     Encounter for monitoring of hydroxyurea therapy 2023     JAK2 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 2023     Hypertension 08/10/2022     Mixed hyperlipidemia 08/10/2022     Essential thrombocytosis (HCC) 2020     TB lung, 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     Psoriatic arthritis ([facility]) 09/10/2019       LOS (days): 1  Geometric Mean LOS (GMLOS) (days): 4  Days to GMLOS:3.3     OBJECTIVE:    Risk of Unplanned Readmission Score: 18.06         Current admission status: Inpatient       [facility] Pharmacy:   [facility] [place], [place] - [address] [id number] [facility] [address]  Phone: [phone] Fax: [phone]    Primary Care Provider: Rafy Angelo MD    Primary Insurance: MEDICARE  Secondary Insurance: AARP    ASSESSMENT:  Active Health Care Proxies    There are no active Health Care Proxies on file.                 Readmission Root Cause  30 Day Readmission: No    Patient Information  Admitted from:: Home  Mental Status: Alert  During Assessment patient was accompanied by: Daughter  Assessment information  provided by:: Patient  Primary Caregiver: Self  Support Systems: Self, Daughter, Spouse/significant other  County of Residence: [place]  What city do you live in?: [place]  Home entry access options. Select all that apply.: Stairs  Number of steps to enter home.: 4  Type of Current Residence: 2 story home  Upon entering residence, is there a bedroom on the main floor (no further steps)?: No  A bedroom is located on the following floor levels of residence (select all that apply):: 2nd Floor  Upon entering residence, is there a bathroom on the main floor (no further steps)?: No  Indicate which floors of current residence have a bathroom (select all the apply):: 2nd Floor  Number of steps to 2nd floor from main floor: One Flight  Living Arrangements: Lives Alone    Activities of Daily Living Prior to Admission  Functional Status: Independent  Completes ADLs independently?: Yes  Ambulates independently?: Yes  Does patient use assisted devices?: No  Does patient currently own DME?: No  Does the patient have a history of Short-Term Rehab?: No  Does patient have a history of HHC?: No         Patient Information Continued  Income Source: Pension/jail  Does patient have prescription coverage?: Yes  Does patient receive dialysis treatments?: No  Does patient have a history of substance abuse?: No  Does patient have a history of Mental Health Diagnosis?: No         Means of Transportation  Means of Transport to Appts:: Drives Self      Social Determinants of Health (SDOH)      Flowsheet Row Most Recent Value   Housing Stability    In the last 12 months, was there a time when you were not able to pay the mortgage or rent on time? N   In the past 12 months, how many times have you moved where you were living? 0   At any time in the past 12 months, were you homeless or living in a shelter (including now)? N   Transportation Needs    In the past 12 months, has lack of transportation kept you from medical appointments or  from getting medications? no   In the past 12 months, has lack of transportation kept you from meetings, work, or from getting things needed for daily living? No   Food Insecurity    Within the past 12 months, you worried that your food would run out before you got the money to buy more. Never true   Within the past 12 months, the food you bought just didn't last and you didn't have money to get more. Never true   Utilities    In the past 12 months has the electric, gas, oil, or water company threatened to shut off services in your home? No            DISCHARGE DETAILS:    Discharge planning discussed with:: Pt and her daughter at bedside  Freedom of Choice: Yes        Were Treatment Team discharge recommendations reviewed with patient/caregiver?: Yes  Did patient/caregiver verbalize understanding of patient care needs?: Yes       Contacts  Reason/Outcome: Discharge Planning, Referral, Continuity of Care    Requested Home Health Care         Is the patient interested in HH at discharge?: Yes  Home Health Discipline requested:: Occupational Therapy, Physical Therapy, Nursing  Home Health Agency Name:: [facility]  Home Health Follow-Up Provider:: PCP  Home Health Services Needed:: Evaluate Functional Status and Safety, Heart Failure Management, Restore Joint Function Post Joint Replacement, Strengthening/Theraputic Exercises to Improve Function, Other (comment) (New medications, new diagnosis)  Homebound Criteria Met:: Requires the Assistance of Another Person for Safe Ambulation or to Leave the Home  Supporting Clincal Findings:: Cognitive Deficit Requiring the Assistance of Others         Other Referral/Resources/Interventions Provided:  Referral Comments: Pt is rec'd for [facility]. PT and her daughter agreeable to [facility], reserved in [place].    Pt resides alone in a 2 story home, 4 ho. PTA, Pt was [place], driving , and was not using any DME. Pt is retired.     Pt is rec'd for HHC and agreeable to [facility], reserved in  [place]

## 2024-07-30 NOTE — CONSULTS
[facility]  Consult  Name: yAla Nye 73 y.o. female I MRN: [id number]  Unit/Bed#: S -01 I Date of Admission: 7/29/2024   Date of Service: 7/30/2024 I Hospital Day: 1    Inpatient consult to Neurosurgery  Consult performed by: ZULEYMA Dangelo  Consult ordered by: Kalpana Dang MD          Assessment & Plan   Brain mass  Assessment & Plan  Brain metastasis  In setting of NSC lung adenocarcinoma diagnosed 3/2024, s/p XRT and paclitaxel and carboplatin (follows with Dr. Castro).  Presented 7/29 with c/o expressive aphasia and peripheral vision loss.  Symptoms have improved since yesterday.  Exam is non-focal. Subjective peripheral vision loss.    Imaging:  MRI brain w/wo, 7/30/2024: 1. Multiple supratentorial and infratentorial enhancing lesions with associated vasogenic edema, the largest of which is in the left occipital lobe. Findings are most consistent with metastases. 2. No acute infarct or midline shift.    Plan:  Reviewed results of imaging extensively with patient and family.  STAT CT head with decline in GCS > 2 pts in 1 hour.  Agree with rad/onc that WBR is most appropriate treatment at this time.  No role for surgical intervention.  Steroids per hem/onc and rad/onc.  Mobilize with PT/OT.  DVT ppx: SCDs, Lovenox.    Neurosurgery will sign off. No follow up.    Malignant neoplasm of upper lobe, right bronchus or lung (HCC)  Assessment & Plan  Stage IIIB NSCLCA           History of Present Illness   HPI: Ayla Nye is a 73 y.o. female with PMH including non-small cell lung adenocarcinoma diagnosed in March 2024, s/p radiation and chemotherapy, HLD, HTN, thrombocytosis, who presented to [facility] ED on 7/29 with complaint of peripheral vision loss, expressive aphasia since Saturday morning. She was evaluated by ophthalmologist 2 weeks ago for flashing light to peripheral vision. Saturday morning she had trouble remembering words that she  was attempting to say. She also noticed that she would reach for objects and would miss them.    Stroke alert was called on arrival to the ED with NIH score of 2. She had CT of head completed indicating multiple areas of hypoattenuation concerning for brain masses. MRI brain confirmed these findings indicating multiple supratentorial and infratentorial enhancing lesions with associated vasogenic edema.    Currently, she states she had one episode of word finding difficulty this morning but otherwise has felt at baseline.     Review of Systems   Constitutional:  Negative for chills and fever.   HENT:  Negative for ear pain and sore throat.    Eyes:  Positive for visual disturbance. Negative for pain.   Respiratory:  Negative for cough and shortness of breath.    Cardiovascular:  Negative for chest pain and palpitations.   Gastrointestinal:  Negative for abdominal pain and vomiting.   Genitourinary:  Negative for dysuria and hematuria.   Musculoskeletal:  Negative for arthralgias and back pain.   Skin:  Negative for color change and rash.   Neurological:  Positive for speech difficulty. Negative for dizziness, tremors, seizures, syncope, facial asymmetry, weakness, light-headedness, numbness and headaches.   All other systems reviewed and are negative.      Historical Information   Past Medical History:   Diagnosis Date    Allergic 2022    Allergic to neomiacin and cephalexin    Cataract Nov. 2023    Due to have durgery June 2024    Essential thrombocytosis (HCC)     controlled with medication    Hyperlipidemia     Hypertension     Mass in chest     Nodular goiter     Pneumonia Oct 16, 2023    Also  Feb 2024    Psoriasis     SIRS (systemic inflammatory response syndrome) (HCC) 06/22/2024     Past Surgical History:   Procedure Laterality Date    APPENDECTOMY      BREAST CYST EXCISION Right 2009    COLONOSCOPY  10/31/2018    DXA PROCEDURE (HISTORICAL)  04/21/2017    MAMMO (HISTORICAL)      8-24-18    MAMMO NEEDLE  LOCALIZATION RIGHT (ALL INC) Right 2009    SKIN LESION EXCISION      bridge of nose     Social History     Substance and Sexual Activity   Alcohol Use Not Currently     Social History     Substance and Sexual Activity   Drug Use Never     Social History     Tobacco Use   Smoking Status Former    Current packs/day: 1.00    Average packs/day: 1 pack/day for 58.6 years (58.6 ttl pk-yrs)    Types: Cigarettes    Start date:     Passive exposure: Past   Smokeless Tobacco Never   Tobacco Comments    Quit      Family History   Problem Relation Age of Onset    Stroke Mother     Depression Mother             Hypertension Mother     Hearing loss Mother     Tuberculosis Father     Cancer Brother         lung    Tuberculosis Brother     Coronary artery disease Brother     Hypertension Brother     No Known Problems Daughter     No Known Problems Daughter     No Known Problems Maternal Grandmother     No Known Problems Maternal Grandfather     No Known Problems Paternal Grandmother     No Known Problems Paternal Grandfather     No Known Problems Maternal Aunt     No Known Problems Maternal Aunt     No Known Problems Maternal Aunt     No Known Problems Maternal Aunt     No Known Problems Paternal Aunt     Cancer Brother         Throat cancer       Meds/Allergies   all current active meds have been reviewed and current meds:   Current Facility-Administered Medications   Medication Dose Route Frequency    aspirin chewable tablet 81 mg  81 mg Oral Daily    atorvastatin (LIPITOR) tablet 40 mg  40 mg Oral QPM    [START ON 2024] dexamethasone (DECADRON) tablet 4 mg  4 mg Oral Daily    enoxaparin (LOVENOX) subcutaneous injection 40 mg  40 mg Subcutaneous Daily    levETIRAcetam (KEPPRA) injection 750 mg  750 mg Intravenous Q12H KRISS     Allergies   Allergen Reactions    Doxycycline Headache    Keflex [Cephalexin] Rash    Neomycin-Polymyxin-Dexameth Eye Swelling       Objective   I/O          07 07  07/29 0701  07/30 0700 07/30 0701  07/31 0700    P.O.  0 240    Total Intake(mL/kg)  0 (0) 240 (5.1)    Urine (mL/kg/hr)  0 350 (1.5)    Total Output  0 350    Net  0 -110                   Physical Exam  Constitutional:       Appearance: She is well-developed.   HENT:      Head: Normocephalic and atraumatic.   Eyes:      Extraocular Movements: EOM normal.      Pupils: Pupils are equal, round, and reactive to light.   Pulmonary:      Effort: Pulmonary effort is normal.   Abdominal:      Palpations: Abdomen is soft.   Musculoskeletal:         General: Normal range of motion.      Cervical back: Normal range of motion and neck supple.   Skin:     General: Skin is warm and dry.   Neurological:      General: No focal deficit present.      Mental Status: She is alert and oriented to person, place, and time. Mental status is at baseline.      Cranial Nerves: No cranial nerve deficit.      Sensory: No sensory deficit.      Motor: Motor strength is normal.No weakness.      Coordination: Coordination normal. Finger-Nose-Finger Test normal.   Psychiatric:         Speech: Speech normal.       Neurologic Exam     Mental Status   Oriented to person, place, and time.   Oriented to person.   Oriented to place.   Oriented to time. Oriented to year, month and date.   Attention: normal. Concentration: normal.   Speech: speech is normal   Level of consciousness: alert    Cranial Nerves     CN III, IV, VI   Pupils are equal, round, and reactive to light.  Extraocular motions are normal.   Right pupil: Size: 3 mm. Shape: regular. Reactivity: brisk. Consensual response: intact. Accommodation: intact.   Left pupil: Size: 3 mm. Shape: regular. Reactivity: brisk. Consensual response: intact. Accommodation: intact.   Nystagmus: none   Diplopia: none  Conjugate gaze: present    CN V   Right facial sensation deficit: none  Left facial sensation deficit: none    CN VII   Facial expression full, symmetric.     CN VIII   Hearing: intact    CN IX,  "X   Palate: symmetric    CN XI   Right sternocleidomastoid strength: normal  Left sternocleidomastoid strength: normal  Right trapezius strength: normal  Left trapezius strength: normal    CN XII   Tongue: not atrophic  Fasciculations: absent  Tongue deviation: none    Motor Exam   Muscle bulk: normal  Overall muscle tone: normal  Right arm pronator drift: absent  Left arm pronator drift: absent    Strength   Strength 5/5 throughout.     Sensory Exam   Light touch normal.   Proprioception normal.     Gait, Coordination, and Reflexes     Coordination   Finger to nose coordination: normal    Tremor   Resting tremor: absent  Intention tremor: absent  Action tremor: absent      Vitals:Blood pressure 116/67, pulse 62, temperature 98.1 °F (36.7 °C), temperature source Oral, resp. rate 12, height 5' 2\" (1.575 m), weight 46.7 kg (103 lb), SpO2 97%.,Body mass index is 18.84 kg/m².     Lab Results:   Results from last 7 days   Lab Units 07/29/24 2339 07/29/24 1958 07/29/24  0923   WBC Thousand/uL  --  4.67 3.79*   HEMOGLOBIN g/dL  --  9.7* 10.2*   HEMATOCRIT %  --  31.1* 32.9*   PLATELETS Thousands/uL 199 230 221   SEGS PCT %  --   --  77*   MONO PCT %  --   --  11   EOS PCT %  --   --  1     Results from last 7 days   Lab Units 07/29/24 1958 07/29/24  0923   POTASSIUM mmol/L 4.1 4.5   CHLORIDE mmol/L 105 107   CO2 mmol/L 26 28   BUN mg/dL 22 18   CREATININE mg/dL 1.09 1.00   CALCIUM mg/dL 9.0 8.9   ALK PHOS U/L  --  60   ALT U/L  --  7   AST U/L  --  14     Results from last 7 days   Lab Units 07/29/24  0923   MAGNESIUM mg/dL 2.1         Results from last 7 days   Lab Units 07/29/24 1958   INR  0.91   PTT seconds 26     No results found for: \"TROPONINT\"  ABG:No results found for: \"PHART\", \"YPT1LDO\", \"PO2ART\", \"UIZ6LOR\", \"I3YCENYY\", \"BEART\", \"SOURCE\"    Imaging Studies: I have personally reviewed pertinent reports.   and I have personally reviewed pertinent films in PACS    MRI brain w wo contrast    Result Date: " 7/30/2024  Impression: 1. Multiple supratentorial and infratentorial enhancing lesions with associated vasogenic edema, the largest of which is in the left occipital lobe. Findings are most consistent with metastases. 2. No acute infarct or midline shift. The study was marked in EPIC for immediate notification. Workstation performed: [id number]     CT stroke alert brain    Result Date: 7/29/2024  Impression: 1. Small area of hypoattenuation in the left superior frontal lobe and larger area in the left posterior parietal region, suggestive of vasogenic edema. Findings are concerning for occult underlying metastases. Acute infarcts cannot be excluded however infarcts in 2 vascular territories and with findings suggesting subacute infarcts, not agreement with the clinical timeline suggest stroke is less likely. 2. No intracranial hemorrhage. Findings were directly discussed with Percy Montalvo  at    8:20 PM . Workstation performed: [id number]     CTA stroke alert (head/neck)    Result Date: 7/29/2024  Impression: No hemodynamically significant stenosis, dissection or occlusion of the carotid or vertebral arteries or major vessels of the [place].. Findings were directly discussed with Percy Montalvo  at  8:29 PM  . Workstation performed: [id number]       EKG, Pathology, and Other Studies: I have personally reviewed pertinent reports.   and I have personally reviewed pertinent films in PACS    VTE Prophylaxis: Sequential compression device (Venodyne)  and Enoxaparin (Lovenox)    Code Status: Level 1 - Full Code  Advance Directive and Living Will:      Power of : Yes  POLST:      Counseling / Coordination of Care  I spent 20 minutes with the patient.

## 2024-07-30 NOTE — PHYSICAL THERAPY NOTE
PHYSICAL THERAPY EVALUATION NOTE          Patient Name: Ayla Nye  Today's Date: 24 0952   PT Last Visit   PT Visit Date 24   Note Type   Note type Evaluation   Pain Assessment   Pain Assessment Tool 0-10   Pain Score No Pain   Restrictions/Precautions   Weight Bearing Precautions Per Order No   Other Precautions Chair Alarm;Bed Alarm;Telemetry;Fall Risk;Visual impairment   Home Living   Type of Home House   Home Layout Stairs to enter with rails;Two level;Bed/bath upstairs  (6 HARRY)   Bathroom Shower/Tub Tub/shower unit   Bathroom Toilet Standard   Bathroom Equipment Grab bars in shower   Home Equipment   (no DME at home)   Prior Function   Level of [place] Independent with ADLs;Independent with functional mobility;Independent with IADLS   Lives With Alone   Receives Help From Neighbor   IADLs Independent with driving;Independent with meal prep;Independent with medication management   Falls in the last 6 months 0   Comments no AD baseline   General   Additional Pertinent History MRI brain w/wo 2024: Multiple supratentorial and infratentorial enhancing lesions with associated vasogenic edema, the largest of which is in the left occipital lobe. Findings are most consistent with metastases.   Family/Caregiver Present Yes  (ex- present t/o session)   Cognition   Arousal/Participation Cooperative   Orientation Level Oriented X4   Following Commands Follows one step commands with increased time or repetition   Comments Pt ID by wristband, name, . Pt pleasant in bed upon arrival. Displays some insight into current deficits.   Subjective   Subjective Feeling somewhat better today   RLE Assessment   RLE Assessment X   Strength RLE   R Hip Flexion 4-/5   R Hip ABduction 4-/5   R Hip ADduction 4-/5   R Knee Flexion 3+/5   R Knee Extension 4-/5   R Ankle Dorsiflexion 3+/5   R Ankle Plantar  "Flexion 4-/5   LLE Assessment   LLE Assessment X   Strength LLE   L Hip Flexion 3+/5   L Hip ABduction 4-/5   L Hip ADduction 4-/5   L Knee Flexion 3+/5   L Knee Extension 4-/5   L Ankle Dorsiflexion 4-/5   L Ankle Plantar Flexion 4-/5   Vision-Basic Assessment   Current Vision Wears glasses all the time   Patient Visual Report Other (Comment)  (R eye: sees \"flashes still\"; denies blury or double vision b/l)   Coordination   Sensation X  (per pt intact b/l LE)   Finger to Nose & Finger to Finger  Impaired  (impaired b/l (R>L), dec speed, cuing required, dec visual tracking of target)   Heel to Shin Intact  (inc speed with slide, dec control with sliding inferiorly however remained on shin b/l)   Rapid Alternating Movements Intact   Bed Mobility   Supine to Sit 5  Supervision   Additional items Assist x 1;HOB elevated;Increased time required   Additional items   (pt OOB in recliner post)   Additional Comments admits to slight dizziness/lightheadedness upon sitting up however resolves shortly after sitting EOB   Transfers   Sit to Stand 5  Supervision   Additional items Assist x 1;Increased time required  (from EOB x1; b/l UE on bed)   Stand to Sit 5  Supervision   Additional items Assist x 1;Armrests  (to recliner x1)   Stand pivot   (CGA)   Additional items   (to recliner x1; mild observed unsteadiness with turn, pivot;)   Ambulation/Elevation   Gait pattern Narrow BILL   Gait Assistance 4  Minimal assist   Additional items Assist x 1  (dec stability on L side, ocassionally reaches for wall or rail when off balance)   Assistive Device Other (Comment)  (none, treatment section includes SPC trial)   Distance 70 ft x2  (EOB, within [place], stair trial, return to bedside recliner; directional changes)   Stair Management Assistance 4  Minimal assist   Additional items Assist x 1;Increased time required   Stair Management Technique One rail R  (R HR ascend with reciprocal pattern, LHR descend with step to pattern; per pt " more difficult with descend>ascend)   Number of Stairs 4  (up/down)   Balance   Static Sitting Fair +   Dynamic Sitting Fair   Static Standing Fair   Dynamic Standing Fair   Ambulatory Fair -   Endurance Deficit   Endurance Deficit Yes   Endurance Deficit Description dec speed and balance (L>R LE) with fatigue   Activity Tolerance   Activity Tolerance Patient limited by fatigue   Medical Staff Made Aware Spoke with CM   Nurse Made Aware Spoke with Nurse Gaines pre/post   Assessment   Prognosis Fair   Problem List Decreased strength;Decreased endurance;Impaired balance;Decreased mobility;Decreased coordination;Impaired vision   Assessment Pt is a 72 yo female admitted [facility] on 7/29/2024 with primary dx of hypertension following c/o imbalance, dizziness/disorientation. Additional pertinent information: MRI brain w/wo, 7/30/2024: Multiple supratentorial and infratentorial enhancing lesions with associated vasogenic edema, the largest of which is in the left occipital lobe. Findings are most consistent with metastases. Pt PMH significant for CKD, lung cancer, SIRS, TB lung, age-related osteoporosis without current pathological fracture, and b/l leg pain. PT consulted with orders for PT eval and treat.  Prior to admission pt was independent with all ALD, IADLs, functional mobility and ambulation with assistance or use of AD. Upon evaluation, pt presents with decreased balance, LE deficits in strength and decreased activity tolerance. Pt requires supervision for bed mobility, sit<>stand transfers with supervision. Pt ambulates with min Ax1 with min Ax1 in order to correct instability L>R. Pt performs stairs with min Ax1 due to decreased stability and control descend>ascend.   Pt is currently not functioning at baseline and would benefit from skilled PT services in order to address aforementioned impairments, return to PLOF, increase functional mobility, decrease caregiver burden and improve QOL. Given discussed  impairments, patient PMH, medical management, pt current medical status is unstable/unpredictable. Pt recommendation is level III at discharge.   Goals   Patient Goals to feel confident with walking   STG Expiration Date 08/13/24   Short Term Goal #1 1. Pt will perform bed mobility independently in order to return to PLOF.  2. Pt will perform all transfers with LRAD mod I in order to improve functional mobility.  3. Pt will improve all aspects of balance by one grade with in order to maximize safety when completing household tasks.   4. Pt will increase LE strength to 5/5 in order to independently perform ADLs.   5. Pt will ambulate for at least 500 ft using LRAD mod I in order to increase endurance for household / community ambulation.   6. Pt will ascend/descend at least 18 stairs with RHR in order to simulate home set up.   Plan   Treatment/Interventions Functional transfer training;LE strengthening/ROM;Elevations;Therapeutic exercise;Endurance training;Patient/family training;Equipment eval/education;Gait training;Compensatory technique education;Spoke to nursing;Spoke to case management;OT;Family;Bed mobility   PT Frequency 2-3x/wk   Discharge Recommendation   Rehab Resource Intensity Level, PT III (Minimum Resource Intensity)  (.)   Equipment Recommended Cane  (SPC)   AM-PAC Basic Mobility Inpatient   Turning in Flat Bed Without Bedrails 4   Lying on Back to Sitting on Edge of Flat Bed Without Bedrails 3   Moving Bed to Chair 3   Standing Up From Chair Using Arms 4   Walk in Room 3   Climb 3-5 Stairs With Railing 3   Basic Mobility Inpatient Raw Score 20   Basic Mobility Standardized Score 43.99   [facility] Highest Level Of Mobility   JH-HLM Goal 6: Walk 10 steps or more   -HLM Achieved 7: Walk 25 feet or more   Additional Treatment Session   Start Time 1001   End Time 1011   Treatment Assessment Post inital ambulation, pt was provided a SPC to trial. Pt performed a sit>stand  transfer with supervision  with SPC and was able to ambulate for an additional 32 ft x1 with SPC and min Ax1 (CGA) within room and turned around slighty outside of room doorway. Use of the SPC allowed for increased stability and mild increase in speed observed. Pt verbalized feeling more confident upon walking with it. Pt stand pivot to bedside recliner and stand>sit after with min Ax1 (CGA).   Equipment Use SPC   Additional Treatment Day   (eval + treat)   End of Consult   Patient Position at End of Consult Bedside chair;Bed/Chair alarm activated;All needs within reach  (ex- left in room with pt)     The patient's AM-PAC Basic Mobility Inpatient Short Form Raw Score is 20. A Raw score of greater than or equal to 16 suggests the patient may benefit from discharge to home. Please also refer to the recommendation of the Physical Therapist for safe discharge planning.    Noemi Espinoza, SPT

## 2024-07-30 NOTE — ED PROVIDER NOTES
History  Chief Complaint   Patient presents with    Dizziness     Reports cutting grass today, felt dizziness after, had flashing in right eye, then felt disoriented/confused and could not remember where things were in the house, was nauseous, unbalanced, feels her reach is off, and over reaches for items. Reports dizziness started about 5pm. +Headache  Finished chemo and radiation 2 weeks ago for lung CA     Patient is a 73-year-old female who presented to the ED for flashing in right eye, dizziness, and lack of coordination. Patient stated that she initially noticed all of these changes at 5 PM today.  She stated that she was at home when she suddenly noticed flashing in the right eye which she had had a few times before but this time it was associated with dizziness and lack of coordination.  Patient stated that when she went to walk around she felt very unsteady on her feet which is unusual for her.  In addition, she had difficulty reaching and grabbing objects and this was described in a way which is consistent with ataxia.  She states that this is never happened to her before.  Notably, patient has lung cancer and recently finished chemotherapy and radiation therapy for this.  She is awaiting to see if she is in remission for this.  Given the acute deficits, stroke alert was initiated.        Prior to Admission Medications   Prescriptions Last Dose Informant Patient Reported? Taking?   Cholecalciferol (VITAMIN D3) 5000 units TABS 2024 Self Yes Yes   Si,000 Units Daily    Probiotic Product (PROBIOTIC DAILY PO) 2024 Self Yes Yes   Sig: Take 1 capsule by mouth daily   aspirin 81 MG tablet 2024 Self Yes Yes   Sig: Take 81 mg by mouth daily    diphenhydrAMINE (BENADRYL) 25 mg capsule 2024 Self Yes Yes   Sig: Take 25 mg by mouth every 12 (twelve) hours as needed for itching    vitamin B-12 (VITAMIN B-12) 1,000 mcg tablet 2024 Self No Yes   Sig: Take 1 tablet (1,000 mcg total) by mouth  daily      Facility-Administered Medications: None       Past Medical History:   Diagnosis Date    Allergic     Allergic to neomiacin and cephalexin    Cataract 2023    Due to have durgery 2024    Essential thrombocytosis (HCC)     controlled with medication    Hyperlipidemia     Hypertension     Mass in chest     Nodular goiter     Pneumonia Oct 16, 2023    Also  2024    Psoriasis     SIRS (systemic inflammatory response syndrome) (HCC) 2024       Past Surgical History:   Procedure Laterality Date    APPENDECTOMY      BREAST CYST EXCISION Right     COLONOSCOPY  10/31/2018    DXA PROCEDURE (HISTORICAL)  2017    MAMMO (HISTORICAL)      18    MAMMO NEEDLE LOCALIZATION RIGHT (ALL INC) Right 2009    SKIN LESION EXCISION      bridge of nose       Family History   Problem Relation Age of Onset    Stroke Mother     Depression Mother             Hypertension Mother     Hearing loss Mother     Tuberculosis Father     Cancer Brother         lung    Tuberculosis Brother     Coronary artery disease Brother     Hypertension Brother     No Known Problems Daughter     No Known Problems Daughter     No Known Problems Maternal Grandmother     No Known Problems Maternal Grandfather     No Known Problems Paternal Grandmother     No Known Problems Paternal Grandfather     No Known Problems Maternal Aunt     No Known Problems Maternal Aunt     No Known Problems Maternal Aunt     No Known Problems Maternal Aunt     No Known Problems Paternal Aunt     Cancer Brother         Throat cancer     I have reviewed and agree with the history as documented.    E-Cigarette/Vaping    E-Cigarette Use Never User      E-Cigarette/Vaping Substances    Nicotine No     THC No     CBD No     Flavoring No     Other No     Unknown No      Social History     Tobacco Use    Smoking status: Former     Current packs/day: 1.00     Average packs/day: 1 pack/day for 58.6 years (58.6 ttl pk-yrs)     Types:  Cigarettes     Start date: 1966     Passive exposure: Past    Smokeless tobacco: Never    Tobacco comments:     Quit 2010   Vaping Use    Vaping status: Never Used   Substance Use Topics    Alcohol use: Not Currently    Drug use: Never        Review of Systems   Constitutional:  Negative for chills, fatigue and fever.   HENT: Negative.     Eyes:  Positive for visual disturbance.   Respiratory:  Negative for cough and shortness of breath.    Cardiovascular:  Negative for chest pain and palpitations.   Gastrointestinal:  Negative for abdominal pain, nausea and vomiting.   Genitourinary: Negative.    Skin: Negative.    Neurological:  Negative for dizziness and syncope.        Endorses lack of balance and coordination, lightheadedness/dizziness.   Psychiatric/Behavioral: Negative.     All other systems reviewed and are negative.      Physical Exam  ED Triage Vitals   Temperature Pulse Respirations Blood Pressure SpO2   07/29/24 1922 07/29/24 1922 07/29/24 1922 07/29/24 1922 07/29/24 1922   98.4 °F (36.9 °C) 84 18 (!) 173/94 100 %      Temp Source Heart Rate Source Patient Position - Orthostatic VS BP Location FiO2 (%)   07/29/24 1922 07/29/24 1922 07/29/24 1922 07/29/24 1922 --   Oral Monitor Sitting Right arm       Pain Score       07/29/24 2000       6             Orthostatic Vital Signs  Vitals:    07/30/24 0458 07/30/24 0751 07/30/24 1117 07/30/24 1551   BP: 131/79 116/67 116/67 128/76   Pulse: 68 62 62    Patient Position - Orthostatic VS:  Lying         Physical Exam  Vitals and nursing note reviewed.   Constitutional:       Appearance: Normal appearance. She is not toxic-appearing.   HENT:      Head: Normocephalic and atraumatic.      Right Ear: Tympanic membrane, ear canal and external ear normal.      Left Ear: Tympanic membrane, ear canal and external ear normal.      Nose: Nose normal.      Mouth/Throat:      Mouth: Mucous membranes are moist.      Pharynx: Oropharynx is clear.   Eyes:      Extraocular  Movements: Extraocular movements intact.      Conjunctiva/sclera: Conjunctivae normal.      Pupils: Pupils are equal, round, and reactive to light.   Cardiovascular:      Rate and Rhythm: Normal rate and regular rhythm.      Pulses: Normal pulses.      Heart sounds: Normal heart sounds.   Abdominal:      General: Abdomen is flat. Bowel sounds are normal.      Palpations: Abdomen is soft.      Tenderness: There is no abdominal tenderness.   Musculoskeletal:      Cervical back: Normal range of motion and neck supple.   Skin:     General: Skin is warm and dry.      Capillary Refill: Capillary refill takes less than 2 seconds.   Neurological:      Mental Status: She is alert and oriented to person, place, and time.      Comments: NIH 2: 2 points for b/l UE ataxia with finger to nose test.  Face symmetric, tongue midline, 5/5 strength in the proximal and distal upper and lower extremities bilaterally with intact sensation to light touch throughout.  CN II-XII intact. Unable to appropriately perform finger to nose b/l. Normal heel-to-shin bilaterally.  Normal speech, normal gait. No pronator drift.  Romberg negative.  Obvious broad-based wobbly and wide gait   Psychiatric:         Mood and Affect: Mood normal.         Behavior: Behavior normal.         Thought Content: Thought content normal.         ED Medications  Medications   levETIRAcetam (KEPPRA) injection 750 mg (750 mg Intravenous Given 7/30/24 0918)   aspirin chewable tablet 81 mg (81 mg Oral Given 7/30/24 0918)   atorvastatin (LIPITOR) tablet 40 mg (40 mg Oral Given 7/29/24 2345)   enoxaparin (LOVENOX) subcutaneous injection 40 mg (40 mg Subcutaneous Given 7/30/24 0918)   dexamethasone (DECADRON) tablet 4 mg (has no administration in time range)   pantoprazole (PROTONIX) EC tablet 40 mg (has no administration in time range)   iohexol (OMNIPAQUE) 350 MG/ML injection (MULTI-DOSE) 85 mL (85 mL Intravenous Given 7/29/24 2019)   levETIRAcetam (KEPPRA) injection 1,000  mg (1,000 mg Intravenous Given 7/29/24 2058)   aspirin tablet 325 mg (325 mg Oral Given 7/29/24 2100)   Gadobutrol injection (SINGLE-DOSE) SOLN 5 mL (5 mL Intravenous Given 7/30/24 0555)   dexamethasone (PF) (DECADRON) injection 10 mg (10 mg Intravenous Given 7/30/24 1202)       Diagnostic Studies  Results Reviewed       Procedure Component Value Units Date/Time    HS Troponin I 2hr [[id number]]  (Normal) Collected: 07/29/24 2339    Lab Status: Final result Specimen: Blood from Arm, Left Updated: 07/30/24 0022     hs TnI 2hr 6 ng/L      Delta 2hr hsTnI 1 ng/L     FLU/RSV/COVID - if FLU/RSV clinically relevant [[id number]]  (Normal) Collected: 07/29/24 1958    Lab Status: Final result Specimen: Nares from Nose Updated: 07/29/24 2051     SARS-CoV-2 Negative     INFLUENZA A PCR Negative     INFLUENZA B PCR Negative     RSV PCR Negative    Narrative:      FOR PEDIATRIC PATIENTS - copy/paste COVID Guidelines URL to browser: [email or web address]    SARS-CoV-2 assay is a Nucleic Acid Amplification assay intended for the  qualitative detection of nucleic acid from SARS-CoV-2 in nasopharyngeal  swabs. Results are for the presumptive identification of SARS-CoV-2 RNA.    Positive results are indicative of infection with SARS-CoV-2, the virus  causing COVID-19, but do not rule out bacterial infection or co-infection  with other viruses. Laboratories within the [place] and its  territories are required to report all positive results to the appropriate  public health authorities. Negative results do not preclude SARS-CoV-2  infection and should not be used as the sole basis for treatment or other  patient management decisions. Negative results must be combined with  clinical observations, patient history, and epidemiological information.  This test has not been FDA cleared or approved.    This test has been authorized by FDA under an Emergency Use Authorization  (EUA).  This test is only authorized for the duration of time the  declaration that circumstances exist justifying the authorization of the  emergency use of an in vitro diagnostic tests for detection of SARS-CoV-2  virus and/or diagnosis of COVID-19 infection under section 564(b)(1) of  the Act, 21 U.S.C. 360bbb-3(b)(1), unless the authorization is terminated  or revoked sooner. The test has been validated but independent review by FDA  and CLIA is pending.    Test performed using [employer] GeneXpert: This RT-PCR assay targets N2,  a region unique to SARS-CoV-2. A conserved region in the E-gene was chosen  for pan-Sarbecovirus detection which includes SARS-CoV-2.    According to CMS-2020-01-R, this platform meets the definition of high-throughput technology.    HS Troponin I 4hr [[id number]]     Lab Status: No result Specimen: Blood     HS Troponin 0hr (reflex protocol) [[id number]]  (Normal) Collected: 07/29/24 1958    Lab Status: Final result Specimen: Blood from Arm, Right Updated: 07/29/24 2031     hs TnI 0hr 5 ng/L     Protime-INR [[id number]]  (Normal) Collected: 07/29/24 1958    Lab Status: Final result Specimen: Blood from Arm, Right Updated: 07/29/24 2030     Protime 12.8 seconds      INR 0.91    APTT [[id number]]  (Normal) Collected: 07/29/24 1958    Lab Status: Final result Specimen: Blood from Arm, Right Updated: 07/29/24 2030     PTT 26 seconds     Basic metabolic panel [[id number]] Collected: 07/29/24 1958    Lab Status: Final result Specimen: Blood from Arm, Right Updated: 07/29/24 2029     Sodium 137 mmol/L      Potassium 4.1 mmol/L      Chloride 105 mmol/L      CO2 26 mmol/L      ANION GAP 6 mmol/L      BUN 22 mg/dL      Creatinine 1.09 mg/dL      Glucose 93 mg/dL      Calcium 9.0 mg/dL      eGFR 50 ml/min/1.73sq m     Narrative:      National Kidney Disease Foundation guidelines for Chronic Kidney Disease (CKD):     Stage 1 with normal or high GFR (GFR > 90 mL/min/1.73 square meters)    Stage 2 Mild CKD (GFR =  60-89 mL/min/1.73 square meters)    Stage 3A Moderate CKD (GFR = 45-59 mL/min/1.73 square meters)    Stage 3B Moderate CKD (GFR = 30-44 mL/min/1.73 square meters)    Stage 4 Severe CKD (GFR = 15-29 mL/min/1.73 square meters)    Stage 5 End Stage CKD (GFR <15 mL/min/1.73 square meters)  Note: GFR calculation is accurate only with a steady state creatinine    CBC and Platelet [[id number]]  (Abnormal) Collected: 07/29/24 1958    Lab Status: Final result Specimen: Blood from Arm, Right Updated: 07/29/24 2012     WBC 4.67 Thousand/uL      RBC 3.03 Million/uL      Hemoglobin 9.7 g/dL      Hematocrit 31.1 %       fL      MCH 32.0 pg      MCHC 31.2 g/dL      RDW 16.2 %      Platelets 230 Thousands/uL      MPV 8.8 fL     Fingerstick Glucose (POCT) [[id number]]  (Normal) Collected: 07/29/24 1941    Lab Status: Final result Specimen: Blood Updated: 07/29/24 1942     POC Glucose 100 mg/dl                    MRI brain w wo contrast   Final Result by Jose Huang MD (07/30 0718)      1. Multiple supratentorial and infratentorial enhancing lesions with associated vasogenic edema, the largest of which is in the left occipital lobe. Findings are most consistent with metastases.      2. No acute infarct or midline shift.      The study was marked in EPIC for immediate notification.      Workstation performed: [id number]         CT stroke alert brain   Final Result by Nelson Toscano MD (07/29 2037)         1. Small area of hypoattenuation in the left superior frontal lobe and larger area in the left posterior parietal region, suggestive of vasogenic edema. Findings are concerning for occult underlying metastases. Acute infarcts cannot be excluded however    infarcts in 2 vascular territories and with findings suggesting subacute infarcts, not agreement with the clinical timeline suggest stroke is less likely.      2. No intracranial hemorrhage.      Findings were directly discussed with Percy Montalvo  at    8:20 PM .       Workstation performed: [id number]         CTA stroke alert (head/neck)   Final Result by Nelson Toscano MD (07/29 2035)      No hemodynamically significant stenosis, dissection or occlusion of the carotid or vertebral arteries or major vessels of the [place]..               Findings were directly discussed with Percy Montalvo  at  8:29 PM  .      Workstation performed: [id number]               Procedures  ECG 12 Lead Documentation Only    Date/Time: 7/29/2024 8:21 PM    Performed by: Greg Rouse MD  Authorized by: Greg Rouse MD    Indications / Diagnosis:  Stroke Workup  ECG reviewed by me, the ED Provider: yes    Patient location:  ED  Previous ECG:     Comparison to cardiac monitor: Yes    Interpretation:     Interpretation: normal    Rate:     ECG rate:  73    ECG rate assessment: normal    Rhythm:     Rhythm: sinus rhythm    Ectopy:     Ectopy: none    QRS:     QRS axis:  Normal    QRS intervals:  Normal  Conduction:     Conduction: normal    ST segments:     ST segments:  Normal  T waves:     T waves: normal          ED Course                  Stroke Assessment       Row Name 07/29/24 2010             NIH Stroke Scale    Interval Baseline  wide based unsteady gait      Level of Consciousness (1a.) 0      LOC Questions (1b.) 0      LOC Commands (1c.) 0      Best Gaze (2.) 0      Visual (3.) 0      Facial Palsy (4.) 0      Motor Arm, Left (5a.) 0      Motor Arm, Right (5b.) 0      Motor Leg, Left (6a.) 0      Motor Leg, Right (6b.) 0      Limb Ataxia (7.) 1      Sensory (8.) 0      Best Language (9.) 0      Dysarthria (10.) 0      Extinction and Inattention (11.) (Formerly Neglect) 0      Total 1                     Stroke Assessment       Row Name 07/29/24 2010             NIH Stroke Scale    Interval Baseline  wide based unsteady gait      Level of Consciousness (1a.) 0      LOC Questions (1b.) 0      LOC Commands (1c.) 0      Best Gaze (2.) 0      Visual (3.) 0      Facial Palsy (4.) 0       Motor Arm, Left (5a.) 0      Motor Arm, Right (5b.) 0      Motor Leg, Left (6a.) 0      Motor Leg, Right (6b.) 0      Limb Ataxia (7.) 1      Sensory (8.) 0      Best Language (9.) 0      Dysarthria (10.) 0      Extinction and Inattention (11.) (Formerly Neglect) 0      Total 1                     Stroke Assessment       Row Name 07/29/24 2010             NIH Stroke Scale    Interval Baseline  wide based unsteady gait      Level of Consciousness (1a.) 0      LOC Questions (1b.) 0      LOC Commands (1c.) 0      Best Gaze (2.) 0      Visual (3.) 0      Facial Palsy (4.) 0      Motor Arm, Left (5a.) 0      Motor Arm, Right (5b.) 0      Motor Leg, Left (6a.) 0      Motor Leg, Right (6b.) 0      Limb Ataxia (7.) 1      Sensory (8.) 0      Best Language (9.) 0      Dysarthria (10.) 0      Extinction and Inattention (11.) (Formerly Neglect) 0      Total 1                     Stroke Assessment       Row Name 07/29/24 2010             NIH Stroke Scale    Interval Baseline  wide based unsteady gait      Level of Consciousness (1a.) 0      LOC Questions (1b.) 0      LOC Commands (1c.) 0      Best Gaze (2.) 0      Visual (3.) 0      Facial Palsy (4.) 0      Motor Arm, Left (5a.) 0      Motor Arm, Right (5b.) 0      Motor Leg, Left (6a.) 0      Motor Leg, Right (6b.) 0      Limb Ataxia (7.) 1      Sensory (8.) 0      Best Language (9.) 0      Dysarthria (10.) 0      Extinction and Inattention (11.) (Formerly Neglect) 0      Total 1                     Stroke Assessment       Row Name 07/29/24 2010             NIH Stroke Scale    Interval Baseline  wide based unsteady gait      Level of Consciousness (1a.) 0      LOC Questions (1b.) 0      LOC Commands (1c.) 0      Best Gaze (2.) 0      Visual (3.) 0      Facial Palsy (4.) 0      Motor Arm, Left (5a.) 0      Motor Arm, Right (5b.) 0      Motor Leg, Left (6a.) 0      Motor Leg, Right (6b.) 0      Limb Ataxia (7.) 1      Sensory (8.) 0      Best Language (9.) 0      Dysarthria  (10.) 0      Extinction and Inattention (11.) (Formerly Neglect) 0      Total 1                                        Medical Decision Making  Patient is a 73-year-old female who presented to the ED for flashing in right eye, dizziness, and lack of coordination. Patient stated that she initially noticed all of these changes at 5 PM today.  She stated that she was at home when she suddenly noticed flashing in the right eye which she had had a few times before but this time it was associated with dizziness and lack of coordination.  Patient stated that when she went to walk around she felt very unsteady on her feet which is unusual for her.  In addition, she had difficulty reaching and grabbing objects and this was described in a way which is consistent with ataxia.  She states that this is never happened to her before.  Notably, patient has lung cancer and recently finished chemotherapy and radiation therapy for this.  She is awaiting to see if she is in remission for this.  Given the acute deficits, stroke alert was initiated.    Ddx includes but is not limited to CVA, vertigo, ocular process, brain metastasis, other CNS lesion.  CBC and CMP were performed which revealed hemoglobin 9.7.  Otherwise, CBC and CMP without acute abnormalities.  NIH stroke scale was performed along with comprehensive neuroexam which revealed NIH 2, 2 points for bilateral limb ataxia in the upper extremities.  Patient notably was unable to appropriately do finger-nose test bilaterally.  Heel-to-shin was preserved bilaterally.  She also had an obviously broad-based, wobbly gait which she stated is unusual for her.  Romberg negative.  She continued to complain of the flashes of light in the right eye but did not have any visual field defects or visual acuity changes in the eyes.  Stroke alert was initiated as stated above and CT head showed vasogenic edema more likely representing metastasis of the patient's known malignancy versus acute  infarct.  It was determined the patient is not a TNK candidate given the chance of metastasis with the vasogenic edema.  Patient was told this information and counseled on it.  Neurology stated that the patient should start prophylactic Keppra and be given loading dose aspirin.  These were done.  Neurology stated that to determine whether this was metastasis versus infarct patient would need MRI head with and without contrast.  Patient was admitted to medicine along stroke pathway with this order placed.    Amount and/or Complexity of Data Reviewed  Labs: ordered.  Radiology: ordered.    Risk  OTC drugs.  Prescription drug management.  Decision regarding hospitalization.          Disposition  Final diagnoses:   Visual disturbance   Ataxia     Time reflects when diagnosis was documented in both MDM as applicable and the Disposition within this note       Time User Action Codes Description Comment    7/29/2024  7:57 PM Greg Rouse [H53.9] Visual disturbance     7/29/2024  7:57 PM Greg Rouse Add [R27.0] Ataxia     7/29/2024  9:10 PM Greg Rouse Modify [H53.9] Visual disturbance     7/29/2024  9:10 PM Greg Rouse Modify [R27.0] Ataxia     7/30/2024  8:26 AM Kalpana Khanna S Add [C79.31] Metastatic cancer to brain (HCC)     7/30/2024  8:27 AM Kalpana Khanna S Add [R29.90] Stroke-like symptoms           ED Disposition       ED Disposition   Admit    Condition   Stable    Date/Time   Mon Jul 29, 2024  9:10 PM    Comment   Case was discussed with SKINNY and the patient's admission status was agreed to be Admission Status: inpatient status to the service of Dr. Rios .               Follow-up Information       Follow up With Specialties Details Why Contact Info    [facility]  Follow up Home Care Services Requested: SKilled Nursing, Physical Therapy, Occupational Therapy [address]  [place] [id number] [phone]               Current Discharge Medication List        CONTINUE these medications which have NOT CHANGED    Details   aspirin 81 MG tablet Take 81 mg by mouth daily       Cholecalciferol (VITAMIN D3) 5000 units TABS 5,000 Units Daily       diphenhydrAMINE (BENADRYL) 25 mg capsule Take 25 mg by mouth every 12 (twelve) hours as needed for itching       Probiotic Product (PROBIOTIC DAILY PO) Take 1 capsule by mouth daily      vitamin B-12 (VITAMIN B-12) 1,000 mcg tablet Take 1 tablet (1,000 mcg total) by mouth daily  Qty: 90 tablet, Refills: 1    Associated Diagnoses: B12 deficiency           No discharge procedures on file.    PDMP Review         Value Time User    PDMP Reviewed  Yes 7/4/2024  4:14 PM Lili Mccoy MD             ED Provider  Attending physically available and evaluated Ayla Nye. I managed the patient along with the ED Attending.    Electronically Signed by           Greg Rouse MD  07/30/24 4403

## 2024-07-30 NOTE — SPEECH THERAPY NOTE
Speech Language/Pathology    Speech/Language Pathology Screen Note    Patient Name: Ayla Nye  Today's Date: 7/30/2024     Consult received for speech/swallow eval on stroke pathway. Pt is a 73 y.o. female with a PMH of non-small cell lung cancer on concurrent chemoradiation, hypertension who presents for abnormal balance, flushing monitoring the right eye.  Patient was apparently doing well and was doing some work at home when she suddenly started having flushing sensation in the right eye, decreased peripheral vision in the right eye as well.  Also was experiencing dull frontal headache, graded 5/10 no radiation no relieving or aggravating factor, denied associated photophobia, phonophobia, tremors or abnormal movements. Pt passed nsg swallow screen; tolerating regular diet w/o s/s dysphagia or aspiration. No current speech/language deficits reported.     NIH score is 2.    MRI:   1. Multiple supratentorial and infratentorial enhancing lesions with associated vasogenic edema, the largest of which is in the left occipital lobe. Findings are most consistent with metastases.   2. No acute infarct or midline shift.    No need for formal speech/swallow eval at this time. Reconsult if needed.      Nadja Green MS, CCC-SLP  Speech Pathologist  Available via [employer]

## 2024-07-31 ENCOUNTER — APPOINTMENT (INPATIENT)
Dept: NEUROLOGY | Facility: HOSPITAL | Age: 74
DRG: 054 | End: 2024-07-31
Payer: MEDICARE

## 2024-07-31 PROBLEM — C79.31 MALIGNANT NEOPLASM METASTATIC TO BRAIN (HCC): Status: ACTIVE | Noted: 2024-07-31

## 2024-07-31 LAB
ANION GAP SERPL CALCULATED.3IONS-SCNC: 5 MMOL/L (ref 4–13)
ATRIAL RATE: 73 BPM
BUN SERPL-MCNC: 13 MG/DL (ref 5–25)
CALCIUM SERPL-MCNC: 9 MG/DL (ref 8.4–10.2)
CHLORIDE SERPL-SCNC: 108 MMOL/L (ref 96–108)
CO2 SERPL-SCNC: 27 MMOL/L (ref 21–32)
CREAT SERPL-MCNC: 0.84 MG/DL (ref 0.6–1.3)
ERYTHROCYTE [DISTWIDTH] IN BLOOD BY AUTOMATED COUNT: 15.9 % (ref 11.6–15.1)
GFR SERPL CREATININE-BSD FRML MDRD: 69 ML/MIN/1.73SQ M
GLUCOSE SERPL-MCNC: 85 MG/DL (ref 65–140)
HCT VFR BLD AUTO: 29.5 % (ref 34.8–46.1)
HGB BLD-MCNC: 9.4 G/DL (ref 11.5–15.4)
MCH RBC QN AUTO: 31.9 PG (ref 26.8–34.3)
MCHC RBC AUTO-ENTMCNC: 31.9 G/DL (ref 31.4–37.4)
MCV RBC AUTO: 100 FL (ref 82–98)
P AXIS: 80 DEGREES
PLATELET # BLD AUTO: 213 THOUSANDS/UL (ref 149–390)
PMV BLD AUTO: 8.9 FL (ref 8.9–12.7)
POTASSIUM SERPL-SCNC: 3.8 MMOL/L (ref 3.5–5.3)
PR INTERVAL: 154 MS
QRS AXIS: 78 DEGREES
QRSD INTERVAL: 70 MS
QT INTERVAL: 408 MS
QTC INTERVAL: 449 MS
RBC # BLD AUTO: 2.95 MILLION/UL (ref 3.81–5.12)
SODIUM SERPL-SCNC: 140 MMOL/L (ref 135–147)
T WAVE AXIS: 72 DEGREES
VENTRICULAR RATE: 73 BPM
WBC # BLD AUTO: 4.32 THOUSAND/UL (ref 4.31–10.16)

## 2024-07-31 PROCEDURE — 80048 BASIC METABOLIC PNL TOTAL CA: CPT

## 2024-07-31 PROCEDURE — 99233 SBSQ HOSP IP/OBS HIGH 50: CPT | Performed by: INTERNAL MEDICINE

## 2024-07-31 PROCEDURE — 95816 EEG AWAKE AND DROWSY: CPT | Performed by: PSYCHIATRY & NEUROLOGY

## 2024-07-31 PROCEDURE — 93010 ELECTROCARDIOGRAM REPORT: CPT | Performed by: INTERNAL MEDICINE

## 2024-07-31 PROCEDURE — 95816 EEG AWAKE AND DROWSY: CPT

## 2024-07-31 PROCEDURE — 99232 SBSQ HOSP IP/OBS MODERATE 35: CPT | Performed by: PSYCHIATRY & NEUROLOGY

## 2024-07-31 PROCEDURE — 85027 COMPLETE CBC AUTOMATED: CPT

## 2024-07-31 RX ORDER — LEVETIRACETAM 500 MG/5ML
750 INJECTION, SOLUTION, CONCENTRATE INTRAVENOUS EVERY 12 HOURS SCHEDULED
Qty: 600 ML | Refills: 0 | Status: CANCELLED | OUTPATIENT
Start: 2024-07-31 | End: 2024-08-30

## 2024-07-31 RX ADMIN — ASPIRIN 81 MG 81 MG: 81 TABLET ORAL at 09:59

## 2024-07-31 RX ADMIN — LEVETIRACETAM 750 MG: 100 INJECTION, SOLUTION INTRAVENOUS at 09:59

## 2024-07-31 RX ADMIN — PANTOPRAZOLE SODIUM 40 MG: 40 TABLET, DELAYED RELEASE ORAL at 05:53

## 2024-07-31 RX ADMIN — LEVETIRACETAM 750 MG: 100 INJECTION, SOLUTION INTRAVENOUS at 21:29

## 2024-07-31 RX ADMIN — DEXAMETHASONE 4 MG: 4 TABLET ORAL at 09:59

## 2024-07-31 RX ADMIN — ATORVASTATIN CALCIUM 40 MG: 40 TABLET, FILM COATED ORAL at 17:04

## 2024-07-31 NOTE — PROGRESS NOTES
[facility]  Progress Note  Name: Ayla Nye I  MRN: [id number]  Unit/Bed#: S -01 I Date of Admission: 7/29/2024   Date of Service: 7/31/2024 I Hospital Day: 2    Assessment & Plan   * Metastatic cancer to brain (HCC)  Assessment & Plan  1913 patient presents today with visual changes, flashing lights and wide-based gait  Cerebellar symptoms noted-Romanenko  And abnormal finger-nose test  1955 patient is a stroke alert NIHS score 2  CTA head and neck-no  LVO-  CT head  Small area of hypoattenuation in the left superior frontal lobe and larger area in the left posterior parietal region, suggestive of vasogenic edema. Findings are concerning for occult underlying metastases. Acute infarcts cannot be excluded however   infarcts in 2 vascular territories and with findings suggesting subacute infarcts, not agreement with the clinical timeline suggest stroke is less likely.     Also concern for possible seizure given right eye visual changes and occipital lesions in the brain    Decision made-no TNK candidate given unclear imaging findings, stroke versus metastatic lesions  Keppra loaded at the ED and given aspirin load    MRI: 1. Multiple supratentorial and infratentorial enhancing lesions with associated vasogenic edema, the largest of which is in the left occipital lobe. Findings are most consistent with metastases.  2. No acute infarct or midline shift.    Plan  Neurology consulted; following  Stroke pathway  Permissive hypertension less than 180  Continue aspirin  Started decadron (10mg loaded, followed by 4mg daily) for vasogenic edema with midline shift  Continue with Keppra 750 twice daily  Neurosurgery consult- no surgical intervention at this time.  Oncology & Radiation oncology consulted consulted and following    Malignant neoplasm metastatic to brain (HCC)  Assessment & Plan  Assessment  -5 new lesions on MRI    Plan:  Outpatient oncology follow up in 2-3  weeks  Radiation oncology treatment schedule next week      Brain mass  Assessment & Plan  Assessment  MRI: 1. Multiple supratentorial and infratentorial enhancing lesions with associated vasogenic edema, the largest of which is in the left occipital lobe. Findings are most consistent with metastases.     2. No acute infarct or midline shift    Plan  -Radiation oncology will treat focally outpatient         Malignant neoplasm of upper lobe, right bronchus or lung (HCC)  Assessment & Plan  Patient with history of right primary lung adenocarcinoma (non-small cell carcinoma) diagnosed in 2024  Patient is on concurrent chemoradiation with Taxol and carboplatin.  Last treatment 7/2  One radiation treatment given here.  7/3 CT C/A/P: interval progression of neoplastic disease -findings inconclusive given patient is also on active treatment.  Plan is to get a PET/CT August 2024 to determine next treatment option possibly immunotherapy  Patient presents today with strokelike symptoms and CT head showing vasogenic edema and findings concerning for possible metastatic disease  Repeat MRI findings consistent with metastases, oncology and radiation oncology consulted and following.    CKD (chronic kidney disease) stage 3, GFR 30-59 ml/min (HCC)  Assessment & Plan  Lab Results   Component Value Date    EGFR 69 07/31/2024    EGFR 50 07/29/2024    EGFR 56 07/29/2024    CREATININE 0.84 07/31/2024    CREATININE 1.09 07/29/2024    CREATININE 1.00 07/29/2024   At baseline,  Continue to monitor, avoid NSAIDs     Hypertension  Assessment & Plan  Home regimen-none  Systolic blood pressure in the 160s    Plan  Permissive hypertension for possible stroke < 180    Essential thrombocytosis (HCC)  Assessment & Plan  Was on Hydroxyurea- Now held  Follows up with heme-onc  Hold Hydroxyurea         VTE Pharmacologic Prophylaxis: VTE Score: 7 High Risk (Score >/= 5) - Pharmacological DVT Prophylaxis Contraindicated. Sequential Compression Devices  Ordered.    Mobility:   Basic Mobility Inpatient Raw Score: 20  JH-HLM Goal: 6: Walk 10 steps or more  JH-HLM Achieved: 7: Walk 25 feet or more  JH-HLM Goal achieved. Continue to encourage appropriate mobility.    Patient Centered Rounds: I performed bedside rounds with nursing staff today.  Discussions with Specialists or Other Care Team Provider: Oncology, radiation oncology, neurology    Education and Discussions with Family / Patient: Updated  (ex-) at bedside.    Current Length of Stay: 2 day(s)  Current Patient Status: Inpatient   Discharge Plan: Anticipate discharge tomorrow to home.    Code Status: Level 1 - Full Code    Subjective:   Patient seen resting comfortably in bed.  No acute overnight events.  Patient reports feeling much improved compared to yesterday.  She is able to ambulate with much more confidence.  She continues to report some trouble finding words periodically like when she was excitable with her family visit yesterday.  She also continues to have some problems with her right-sided peripheral vision.  Otherwise patient is feeling well, she denies any chest pain, palpitations, and SOB.    Objective:     Vitals:   Temp (24hrs), Av.9 °F (36.6 °C), Min:97.6 °F (36.4 °C), Max:98 °F (36.7 °C)    Temp:  [97.6 °F (36.4 °C)-98 °F (36.7 °C)] 97.6 °F (36.4 °C)  HR:  [68] 68  Resp:  [12-17] 17  BP: (106-127)/(71-82) 127/82  SpO2:  [100 %] 100 %  Body mass index is 18.84 kg/m².     Input and Output Summary (last 24 hours):     Intake/Output Summary (Last 24 hours) at 2024 1700  Last data filed at 2024 1351  Gross per 24 hour   Intake 420 ml   Output --   Net 420 ml       Physical Exam:   Physical Exam  Constitutional:       Appearance: She is normal weight.   HENT:      Head: Normocephalic and atraumatic.      Nose: No congestion or rhinorrhea.      Mouth/Throat:      Mouth: Mucous membranes are moist.      Pharynx: No oropharyngeal exudate or posterior oropharyngeal  erythema.   Eyes:      General: No scleral icterus.        Right eye: No discharge.         Left eye: No discharge.      Extraocular Movements: Extraocular movements intact.      Conjunctiva/sclera: Conjunctivae normal.   Cardiovascular:      Rate and Rhythm: Normal rate and regular rhythm.      Pulses: Normal pulses.      Heart sounds: Normal heart sounds. No murmur heard.     No friction rub. No gallop.   Pulmonary:      Effort: Pulmonary effort is normal. No respiratory distress.      Breath sounds: No stridor. No wheezing, rhonchi or rales.   Chest:      Chest wall: No tenderness.   Abdominal:      General: Abdomen is flat. Bowel sounds are normal. There is no distension.      Palpations: Abdomen is soft. There is no mass.      Tenderness: There is no abdominal tenderness. There is no right CVA tenderness, left CVA tenderness, guarding or rebound.      Hernia: No hernia is present.   Musculoskeletal:         General: No swelling, tenderness, deformity or signs of injury. Normal range of motion.      Right lower leg: No edema.      Left lower leg: No edema.   Skin:     General: Skin is warm.      Coloration: Skin is not jaundiced or pale.      Findings: No bruising, erythema, lesion or rash.   Neurological:      General: No focal deficit present.      Mental Status: She is alert and oriented to person, place, and time. Mental status is at baseline.      Cranial Nerves: No cranial nerve deficit.      Sensory: No sensory deficit.      Motor: Weakness present.      Coordination: Coordination normal. Finger-Nose-Finger Test normal.      Gait: Gait normal.   Psychiatric:         Mood and Affect: Mood normal.         Behavior: Behavior normal.         Thought Content: Thought content normal.         Judgment: Judgment normal.         Additional Data:     Labs:  Results from last 7 days   Lab Units 07/31/24  0607 07/29/24 1958 07/29/24  0923   WBC Thousand/uL 4.32   < > 3.79*   HEMOGLOBIN g/dL 9.4*   < > 10.2*    HEMATOCRIT % 29.5*   < > 32.9*   PLATELETS Thousands/uL 213   < > 221   SEGS PCT %  --   --  77*   LYMPHO PCT %  --   --  9*   MONO PCT %  --   --  11   EOS PCT %  --   --  1    < > = values in this interval not displayed.     Results from last 7 days   Lab Units 07/31/24  0607 07/29/24 1958 07/29/24  0923   SODIUM mmol/L 140   < > 140   POTASSIUM mmol/L 3.8   < > 4.5   CHLORIDE mmol/L 108   < > 107   CO2 mmol/L 27   < > 28   BUN mg/dL 13   < > 18   CREATININE mg/dL 0.84   < > 1.00   ANION GAP mmol/L 5   < > 5   CALCIUM mg/dL 9.0   < > 8.9   ALBUMIN g/dL  --   --  4.0   TOTAL BILIRUBIN mg/dL  --   --  0.33   ALK PHOS U/L  --   --  60   ALT U/L  --   --  7   AST U/L  --   --  14   GLUCOSE RANDOM mg/dL 85   < >  --     < > = values in this interval not displayed.     Results from last 7 days   Lab Units 07/29/24 1958   INR  0.91     Results from last 7 days   Lab Units 07/29/24  1941   POC GLUCOSE mg/dl 100     Results from last 7 days   Lab Units 07/30/24  0502   HEMOGLOBIN A1C % 5.3           Lines/Drains:  Invasive Devices       Peripheral Intravenous Line  Duration             Peripheral IV 07/29/24 Right Antecubital 1 day                      Telemetry:  Telemetry Orders (From admission, onward)               24 Hour Telemetry Monitoring  Continuous x 24 Hours (Telem)        Question:  Reason for 24 Hour Telemetry  Answer:  TIA/Suspected CVA/ Confirmed CVA                     Telemetry Reviewed: Normal Sinus Rhythm  Indication for Continued Telemetry Use: Acute CVA pathway             Imaging: Reviewed radiology reports from this admission including: MRI brain    Recent Cultures (last 7 days):         Last 24 Hours Medication List:   Current Facility-Administered Medications   Medication Dose Route Frequency Provider Last Rate    aspirin  81 mg Oral Daily Kalpana Dang MD      atorvastatin  40 mg Oral QPM Kalpana Dang MD      dexamethasone  4 mg Oral Daily Damaso Silva MD       enoxaparin  40 mg Subcutaneous Daily Kalpana Dang MD      levETIRAcetam  750 mg Intravenous Q12H KRISS Kalpana Dang MD      pantoprazole  40 mg Oral Early Morning Kaylan Rios MD          Today, Patient Was Seen By: Jelena Gamble MD    **Please Note: This note may have been constructed using a voice recognition system.**

## 2024-07-31 NOTE — ASSESSMENT & PLAN NOTE
Patient with history of right primary lung adenocarcinoma (non-small cell carcinoma) diagnosed in 2024  Patient is on concurrent chemoradiation with Taxol and carboplatin.  Last treatment 7/2  One radiation treatment given here.  7/3 CT C/A/P: interval progression of neoplastic disease -findings inconclusive given patient is also on active treatment.  Plan is to get a PET/CT August 2024 to determine next treatment option possibly immunotherapy  Patient presents today with strokelike symptoms and CT head showing vasogenic edema and findings concerning for possible metastatic disease  Repeat MRI findings consistent with metastases, oncology and radiation oncology consulted and following.

## 2024-07-31 NOTE — PROGRESS NOTES
[facility]   NEUROLOGY RESIDENCY - PROGRESS NOTE  Note Date: 24  Note Time: 1:47 PM   Patient: Ayla Nye  Age, : 73 y.o., 1950 MRN: [id number]  Code Status: Level 1 - Full Code Patient Location: S /S -01 Hospital LOS:2 days    Ayla Nye will need follow up in in 6 weeks with epilepsy AP/Attending . She will not require outpatient neurological testing.   Pending for discharge: Clear to discharge once EEG results  Assessment & Plan  Malignant neoplasm metastatic to brain (HCC)  73 F with pertinent PMH of NSCLC diagnosed earlier this year treated with chemoradiation (paclitaxel, carboplatin), JAK2+ Essential Thrombocytosis,  HTN, HLD, Psoriasis, and cataracts who presented on  for evaluation of acute onset R sided dis coordination, imbalance, frontal headache, and R visual field disturbance. Stroke alert called at 1955 (), LKN 1700 (). /94, FSBG 100, NIHSS 2 (1+ Limb Ataxia,??). NC CTH revealed small area of hypoattenuation in the L superior frontal lobe and larger area in the L posterior parietal region suggestive of vasogenic edema. CTA H/N did not show any hemodynamically significant stenosis, or large vessel occlusion. Given findings on CTH with concerns for malignancy/edema versus subacute stroke TNK was contraindicated due to risks overweighs benefits. She was found not to be a candidate for thrombectomy given absence of IR target. Given patient's clinical history right visual field disturbance and L occipital lesion noted on NC CTH concern for that patient's symptomatology was secondary to seizure activity and was given a 1 g levetiracetam load and started on 750 mg maintenance dosing. Patient was admitted along the stroke pathway and was loaded with aspirin 325 mg x 1  - Home antiplatelet regimen: Aspirin 81 mg QD    Workup:  MRI brain + deonte: Revealed multiple supratentorial and infratentorial enhancing lesions with  associated vasogenic edema the largest within the left occipital lobe consistent with metastases.  TTE: EF 60%, PFO with bidirectional shunting  Labs: Hemoglobin A1c 5.3 (7/30/2024),  (7/30/2024)  Routine EEG: Intermittent, left occipital predominant, mixed theta and delta slowing suggests left occipital focal neuronal dysfunction.(Pending official read)    Impression: Pt with known NSCLC s/p completion of chemoradiation earlier this month presenting with R sided instability/incoordination with transient episodic R visual field obscurities. MR imaging revealing multiple infra/supratentorial enhancing lesions consistent with metastases. R sided motor symptoms resolved but continues to have episodic visual disturbances with examination consistent with right sided lower quadrantanopia localizing to the L occipital lobe where the largest of her lesions is located.    Plan:  Frequent Neuro Checks - STAT CTH for acute changes in exam  Seizure precautions   Can discontinue stroke pathway. Pursue can pursue normotension, permissive HTN not needed. Can continue home ASA  AEDs: Continue Keppra 750 mg BID  Routine EEG, pending  Steroids: Decadron 10 mg x1 followed by 4 mg QD, will defer to rad for future steroid treatment  Consults: Neurosurgery, Radiation Oncology, hematology oncology - appreciate recs  Multidisciplinary decision made for treatment with radiation therapy SRS will be started as an outpatient [facility] in the upcoming weeks  PT/OT/ST  Outpatient follow-up with epilepsy  [employer] form submitted and patient instructed to refrain from driving for at least the next 6 months due to concern for seizure activity as well as visual deficit. Seizure safety provided.  Subjective   SUBJECTIVE   Patient seen this morning during and examined at bedside. Reports she is currently asymptomatic. Has not had recurrence of visual field disturbance since yesterday morning.  Review of Systems: History obtained from the  patient   Neurological ROS: negative for - behavioral changes, bowel and bladder control changes, confusion, dizziness, gait disturbance, headaches, impaired coordination/balance, memory loss, numbness/tingling, seizures, speech problems, tremors, visual changes, or weakness   Complete 14 point review of systems completed and was otherwise unremarkable .  Objective   OBJECTIVE   Patient ID: Ayla Nye is a 73 y.o. female.  VS: Temp:  [97.6 °F (36.4 °C)-98.2 °F (36.8 °C)] 97.6 °F (36.4 °C)  HR:  [68] 68  Resp:  [12-14] 14  BP: (106-128)/(71-82) 127/82   GENERAL EXAM:  Constitutional: Not in acute distress. Not ill-appearing, toxic-appearing or diaphoretic.   HENT: Normocephalic and atraumatic. Nose and Ears normal.   Eyes: No scleral icterus. No discharge.   Cardiovascular:  Distal extremities warm without palpable edema or tenderness, no observed significant swelling.   Pulmonary: Pulmonary effort is normal. Not in respiratory distress  Musculoskeletal: No swelling or deformity.  Psychiatric: Normal behavior and appropriate affect     NEUROLOGIC  EXAM:  Mental Status: Alertness: alert, Orientation: time, date, person, place, city, president, Speech/Language fluent, clear, coherent, Commands: Can follow multistep commands  Cranial Nerves:  I Not tested   II Visual Fields lower quadrantanopia, in R visual fields   II, Ill,  IV, VI Pupils equal, round, reactive to light and accommodation  EOM full and intact   V facial sensation was normal and symmetrical   VII facial symmetry equal   VIII Normal hearing to speech   IX, X Normal Palatal Elevation, No Uvular Deviation   XI Shoulder shrug and head turn is normal   XII Midline tongue protrusion   Motor: No pronator drift, No atrophy or muscle wasting, Normal tone, No Involuntary Movements, and No tremors appreciated  Arm Right  Left Leg Right  Left   Deltoid 5/5 5/5 lliopsoas 5/5 5/5   Biceps 5/5 5/5 Quads 5/5 5/5   Triceps 5/5 5/5 Hamstrings 5/5 5/5   Wrist  Extension 5/5 5/5 Ankle Dorsi Flexion 5/5 5/5   Wrist Flexion 5/5 5/5 Ankle Plantar Flexion 5/5 5/5   Reflexes: No clonus, no Smith's, no cross abductors, toes down     BICEP   TRICEPS   BRACHIO   PATELLAR   ACHILLES   RIGHT 2+ 2+ 2+ 2+ 2+   LEFT 2+ 2+ 2+ 2+ 2+   Sensory: Normal light touch sensation  Coordination: Cerebellar arm drift absent. Finger To Nose: Right Intact, Left Intact. Heel To Shin: Right Intact, Left Intact  Station/Gait: Deferred d/t medical severity  LABORATORY DATA    Labs: I have personally reviewed pertinent labs.    CBC:  Results from last 7 days   Lab Units 07/31/24 0607 07/29/24 2339 07/29/24 1958 07/29/24 0923   WBC Thousand/uL 4.32  --  4.67 3.79*   RBC Million/uL 2.95*  --  3.03* 3.20*   HEMOGLOBIN g/dL 9.4*  --  9.7* 10.2*   HEMATOCRIT % 29.5*  --  31.1* 32.9*   MCV fL 100*  --  103* 103*   MCH pg 31.9  --  32.0 31.9   MCHC g/dL 31.9  --  31.2* 31.0*   RDW % 15.9*  --  16.2* 15.9*   MPV fL 8.9 8.8* 8.8* 9.0   PLATELETS Thousands/uL 213 199 230 221   NRBC AUTO /100 WBCs  --   --   --  0   SEGS PCT %  --   --   --  77*   LYMPHO PCT %  --   --   --  9*   MONO PCT %  --   --   --  11   EOS PCT %  --   --   --  1   BASOS PCT %  --   --   --  1   TOTAL NEUT ABS Thousands/µL  --   --   --  2.95   LYMPHS ABS Thousands/µL  --   --   --  0.34*   MONOS ABS Thousand/µL  --   --   --  0.40   EOS ABS Thousand/µL  --   --   --  0.05   ,   Chemistry Profile:   Results from last 7 days   Lab Units 07/31/24 0607 07/29/24 1958 07/29/24 0923   POTASSIUM mmol/L 3.8 4.1 4.5   CHLORIDE mmol/L 108 105 107   CO2 mmol/L 27 26 28   BUN mg/dL 13 22 18   CREATININE mg/dL 0.84 1.09 1.00   CALCIUM mg/dL 9.0 9.0 8.9   MAGNESIUM mg/dL  --   --  2.1   AST U/L  --   --  14   ALT U/L  --   --  7   ALK PHOS U/L  --   --  60   EGFR ml/min/1.73sq m 69 50 56     Results from last 7 days   Lab Units 07/31/24  0607 07/29/24 1958   GLUCOSE RANDOM mg/dL 85 93      Recent Labs     07/29/24 1941   POCGLU 100     PT/INR:  "No results found for: \"PT\", \"INR\",   Hemoglobin A1c 5.3 (7/30/2024),  (7/30/2024)    Micro: I have reviewed pertinent results.      Lab Results   Component Value Date    BLOODCX No Growth After 5 Days. 07/03/2024    BLOODCX No Growth After 5 Days. 07/03/2024    BLOODCX No Growth After 5 Days. 06/22/2024    WOUNDCULT 4+ Growth of Providencia rettgeri (A) 08/18/2023    WOUNDCULT 3+ Growth of 08/18/2023     IMAGING STUDIES    Radiology Results: I have personally reviewed pertinent reports and reviewed films in PACS  MRI brain w wo contrast    Result Date: 7/30/2024  Impression: 1. Multiple supratentorial and infratentorial enhancing lesions with associated vasogenic edema, the largest of which is in the left occipital lobe. Findings are most consistent with metastases. 2. No acute infarct or midline shift. The study was marked in EPIC for immediate notification. Workstation performed: [id number]     CT stroke alert brain    Result Date: 7/29/2024  Impression: 1. Small area of hypoattenuation in the left superior frontal lobe and larger area in the left posterior parietal region, suggestive of vasogenic edema. Findings are concerning for occult underlying metastases. Acute infarcts cannot be excluded however infarcts in 2 vascular territories and with findings suggesting subacute infarcts, not agreement with the clinical timeline suggest stroke is less likely. 2. No intracranial hemorrhage. Findings were directly discussed with Percy Montalvo  at    8:20 PM . Workstation performed: [id number]     CTA stroke alert (head/neck)    Result Date: 7/29/2024  Impression: No hemodynamically significant stenosis, dissection or occlusion of the carotid or vertebral arteries or major vessels of the [place].. Findings were directly discussed with Percy Montalvo  at  8:29 PM  . Workstation performed: [id number]      Other Diagnostic Results: I have personally reviewed pertinent reports  Results for orders placed during the hospital " encounter of 07/29/24    Echo complete w/ contrast if indicated    Interpretation Summary    Left Ventricle: Left ventricular cavity size is normal. Wall thickness is normal. The left ventricular ejection fraction is 60%. Systolic function is normal. Wall motion is normal. Diastolic function is normal.    Right Ventricle: Right ventricular cavity size is mildly dilated. Systolic function is normal.    Atrial Septum: There is a patent foramen ovale.  Bidirectional shunting with bubbles visualized in the LV.    Mitral Valve: There is mild annular calcification.    Tricuspid Valve: There is mild regurgitation. The estimated right ventricular systolic pressure is 29.00 mmHg.    Encounter Date: 07/29/24   ECG 12 lead   Result Value    Ventricular Rate 73    Atrial Rate 73    CT Interval 154    QRSD Interval 70    QT Interval 408    QTC Interval 449    P Axis 80    QRS Axis 78    T Wave Axis 72    Narrative    Normal sinus rhythm  Possible Anterior infarct (cited on or before 22-JUN-2024)  Abnormal ECG  When compared with ECG of 03-JUL-2024 01:15,  No significant change was found  Confirmed by Gorge Huffman ([id number]) on 7/31/2024 1:21:17 PM        ACTIVE MEDICATIONS   Scheduled PRN   aspirin, 81 mg, Daily  atorvastatin, 40 mg, QPM  dexamethasone, 4 mg, Daily  enoxaparin, 40 mg, Daily  levETIRAcetam, 750 mg, Q12H KRISS  pantoprazole, 40 mg, Early Morning          Continuous          VTE Mechanical Prophylaxis: Sequential Compression Device  VTE Pharmacologic Prophylaxis: VTE covered by:  enoxaparin, Subcutaneous, 40 mg at 07/30/24 0918      ====================================================  Keith Charles DO [facility], PGY-3

## 2024-07-31 NOTE — ASSESSMENT & PLAN NOTE
1913 patient presents today with visual changes, flashing lights and wide-based gait  Cerebellar symptoms noted-Romanenko  And abnormal finger-nose test  1955 patient is a stroke alert NIHS score 2  CTA head and neck-no  LVO-  CT head  Small area of hypoattenuation in the left superior frontal lobe and larger area in the left posterior parietal region, suggestive of vasogenic edema. Findings are concerning for occult underlying metastases. Acute infarcts cannot be excluded however   infarcts in 2 vascular territories and with findings suggesting subacute infarcts, not agreement with the clinical timeline suggest stroke is less likely.     Also concern for possible seizure given right eye visual changes and occipital lesions in the brain    Decision made-no TNK candidate given unclear imaging findings, stroke versus metastatic lesions  Keppra loaded at the ED and given aspirin load    MRI: 1. Multiple supratentorial and infratentorial enhancing lesions with associated vasogenic edema, the largest of which is in the left occipital lobe. Findings are most consistent with metastases.  2. No acute infarct or midline shift.    Plan  Neurology consulted; following  Stroke pathway  Permissive hypertension less than 180  Continue aspirin  Started decadron (10mg loaded, followed by 4mg daily) for vasogenic edema with midline shift  Continue with Keppra 750 twice daily  Neurosurgery consult- no surgical intervention at this time.  Oncology & Radiation oncology consulted consulted and following   hemoglobin A1C greater than 7%

## 2024-07-31 NOTE — DISCHARGE INSTRUCTIONS
SEIZURE SAFETY    Don’t let fear of seizures keep you at home. Be smart about your activities to make sure you are safe. The guidelines below can help you be as safe as possible.    Make sure the people around you are aware of:  What happens when you have a seizure  Correct seizure first aid  First aid for choking (consider taking a basic life support class)  When they should know to call 911 or for medical help    Avoid common triggers of seizures:  Missing your medications  Not getting enough sleep  Drinking alcohol  Using illegal drugs    Safety measures for at home:  In the bathroom:   Do not take baths in the bathtub. Instead, take only showers. Try to have a family member available while you are in the shower.  Make sure the drain in the bathtub/shower is working properly to avoid pooling of water.  You can consider a fitted shower seat. Recessed soap trays can minimize injury if you would happen to fall in the shower.   Bathroom doors can be hung to open outwards, so that the door can still be opened if you fall against the door.   Do not lock the bathroom door. Use an “Occupied” sign on the door or other signal to let others know you are in the bathroom.  Safety locks can be obtained from the [employer].  On your water heater, set your water temperature to a warm temperature that is not scalding to avoid burns from very hot water.   Put non-skid strips/ in the bathtub.  Use an electric shaver.  Avoid any electrical appliances (including electric shaver) in the bathroom or near water.  Use shatterproof glass for mirrors.    In the kitchen:   When possible, cook using a microwave.  Only cook or use electrical appliances when someone else is in the house and available.   As much as possible, grill food and avoid fryers or frying food.  Use the back burners of the stove and turn the pot handles toward the back of the stove.  Avoid carrying hot pans, pots of boiling water, or very hot food.  Serve food or liquids directly from the stove. At the least, minimize the distance hot food is carried.   Use precut foods or food processers to limit the need to use knives.   Use plastic or durable cups, dishes, and containers instead of breakable glass items.    In the bedroom  Low level and wide beds (like a futon) can reduce risk of injury of falling out of bed. If there is a high risk of falling out of bed, you can consider simply putting the mattress on the floor.  Avoid sleeping on top bunks of bunk beds.   Place a soft carpet on the floor.    Around the house  Pad sharp corners of tables, chairs, etc. Round tables and furniture can be considered to avoid sharp corners.   Avoid open flames. Place a screen in front of fireplaces and don’t build a fire alone.   Allow for open spaces with furniture.  Avoid loose throw rugs or slippery floors. Non-slip josseline or cushioned josseline can help reduce injury from a fall.   Fireproof fabrics and furniture are best, and especially important if you smoke.  Doors and windows with safety glass are safer if someone falls against them.  Avoid lights and heaters that could easily be knocked over.  Use curling irons or clothing irons that have automatic shut off switches.  Make sure motor driven equipment or lawn mowers have “dead man’s” handles or seats so they will turn off if you release pressure.    Safety measures when away from home  Driving  [place] law mandates that you cannot drive for 6 months after your most recent seizure.   [place] law mandates that you cannot drive for 6 months after your most recent seizure.    Work/Travel  Wear a medical alert bracelet or necklace at all times.  Wear a helmet and use protective clothing/equipment when appropriate.  Consider telling co-workers and travel companions that you have epilepsy and what to do if you have a seizure.  Avoid irregular shifts or disruptions of a regular sleep pattern.  Take your medications on  time and keep an updated list of medications in your purse or wallet.  Do not climb to heights or operate heavy machinery.  Stand back from the edges of roads or bus/train platforms when traveling.  If you wander when you have a seizure, take a friend along for the trip.    Recreation  Swimming can be dangerous. Do not swim alone, in open water, or in murky water that you cannot see the bottom.   Caregivers should be with you in the pool at all times and must be physically able to get you out of the water.  Use a flotation device.   Scuba diving is not recommended since during a seizure people are unaware of their surroundings.  Having a seizure underwater can be deadly and can endanger the lives of others.  Kayaking and canoeing can be especially dangerous  People with epilepsy are at a higher risk of becoming trapped underneath a canoe or kayak.  Wear a helmet when playing contact sports, biking, or if there is a risk of falling.  Patients with epilepsy are at a higher risk of head injury when playing contact sports.  Avoid riding a bike, running, or other activities around busy roads, steep hills, or secluded areas.   Exercise on soft surfaces.  [place]: many people with epilepsy can go on rides depending on their type of seizures.  For some people, stress or excitement can trigger a seizure.   Rollercoasters (especially if you are upside-down) are more dangerous for people with epilepsy.      Medications  Take your medications on time. If you have trouble remembering, set alarms on your phone. You can visit www.[employer].com to set up reminders through text message to help you remember to take your medications.  Use a pillbox to help you keep track of your medications.  When out of the house, take any needed medications with you. Consider keeping one or two extra doses with you in case you are unexpectedly away from home longer than planned.  If you realize you missed a dose of your medications and it  is less than 2 hours until your next dose, skip the missed dose. Do not double up your medication dose. If it is more than 2 hours until your next dose, you can take the missed dose.   Avoid drinking alcohol, since this can enhance effects of your seizure medications.  Be aware of common and major side effects of your medications.  Keep your medications out of the reach of children.    Parenting:  Childproof your home as much as possible.  It is possible that you could drop your baby if you have a seizure while holding or feeding them.  If you are nursing a baby, sit on the floor or bed with your back supported so the baby will not fall far if you should lose consciousness.  Feed the baby while he or she is seated in an infant seat.  Dress, change, and sponge bathe the baby on the floor.  Move the baby around in a stroller or small crib.  Keep a young baby in a playpen when you are alone, and a toddler in an indoor play yard, or childproof one room and use safety shepard at the doors.  When out of the house, use a bungee-type cord or restraint harness so your child cannot wander away if you have a seizure that affects your awareness.

## 2024-07-31 NOTE — DISCHARGE INSTR - AVS FIRST PAGE
Dear Ayla Nye,     It was our pleasure to care for you here at [facility].  It is our hope that we were always able to exceed the expected standards for your care during your stay.  You were hospitalized due to altered balance and vision problems.  You were cared for on the 3rd floor by Jelena Gamble MD under the service of Kaylan Cardona* with the [facility] who covers for your primary care physician (PCP), Rafy Angelo MD, while you were hospitalized.  If you have any questions or concerns related to this hospitalization, you may contact us at .  For follow up as well as any medication refills, we recommend that you follow up with your primary care physician.  A registered nurse will reach out to you by phone within a few days after your discharge to answer any additional questions that you may have after going home.  However, at this time we provide for you here, the most important instructions / recommendations at discharge:     Notable Medication Adjustments -   Start taking dexamethasone (Decadron) 4 mg tablet once a day for help in reduction of swelling  Start taking levetiracetam (Keppra) 750mg twice daily for seizure prophylaxis   Start taking pantoprazole (Protonix) 40 mg tablet to prevent acid reflux  Start taking sulfamethoxazole-trimethorprim (BACTRIM DS) 800-160 mg for prophylaxis against bacterial (PCP) infection   Start taking atorvastatin (Lipitor) 40 mg tablet by mouth every morning for lipid management   Testing Required after Discharge -   None  ** Please contact your PCP to request testing orders for any of the testing recommended here **  Important follow up information -   Please follow up with your radiation oncologist Dr. Gamboa   Please follow up with your oncologist Dr. Castro as an outpatient in 2-3 weeks from discharge   Follow up with [facility] in 6 weeks   Please follow up with   your PCP in 1-2 weeks   Other Instructions -   Please return to your hospital if you notice any new vision changes, balance changes or for worsening of symptoms.  Please remember you are not permitted to drive until cleared by your neurologist/PCP  Please review this entire after visit summary as additional general instructions including medication list, appointments, activity, diet, any pertinent wound care, and other additional recommendations from your care team that may be provided for you.      Sincerely,     Jelena Gamble MD

## 2024-07-31 NOTE — ASSESSMENT & PLAN NOTE
Assessment  -5 new lesions on MRI    Plan:  Outpatient oncology follow up in 2-3 weeks  Radiation oncology treatment schedule next week

## 2024-07-31 NOTE — ASSESSMENT & PLAN NOTE
Lab Results   Component Value Date    EGFR 69 07/31/2024    EGFR 50 07/29/2024    EGFR 56 07/29/2024    CREATININE 0.84 07/31/2024    CREATININE 1.09 07/29/2024    CREATININE 1.00 07/29/2024   At baseline,  Continue to monitor, avoid NSAIDs

## 2024-07-31 NOTE — ASSESSMENT & PLAN NOTE
Assessment  MRI: 1. Multiple supratentorial and infratentorial enhancing lesions with associated vasogenic edema, the largest of which is in the left occipital lobe. Findings are most consistent with metastases.     2. No acute infarct or midline shift    Plan  -Radiation oncology will treat focally outpatient

## 2024-07-31 NOTE — ASSESSMENT & PLAN NOTE
73 F with pertinent PMH of NSCLC diagnosed earlier this year treated with chemoradiation (paclitaxel, carboplatin), JAK2+ Essential Thrombocytosis,  HTN, HLD, Psoriasis, and cataracts who presented on 7/29 for evaluation of acute onset R sided dis coordination, imbalance, frontal headache, and R visual field disturbance. Stroke alert called at 1955 (7/29), LKN 1700 (7/29). /94, FSBG 100, NIHSS 2 (1+ Limb Ataxia,??). NC CTH revealed small area of hypoattenuation in the L superior frontal lobe and larger area in the L posterior parietal region suggestive of vasogenic edema. CTA H/N did not show any hemodynamically significant stenosis, or large vessel occlusion. Given findings on CTH with concerns for malignancy/edema versus subacute stroke TNK was contraindicated due to risks overweighs benefits. She was found not to be a candidate for thrombectomy given absence of IR target. Given patient's clinical history right visual field disturbance and L occipital lesion noted on NC CTH concern for that patient's symptomatology was secondary to seizure activity and was given a 1 g levetiracetam load and started on 750 mg maintenance dosing. Patient was admitted along the stroke pathway and was loaded with aspirin 325 mg x 1  - Home antiplatelet regimen: Aspirin 81 mg QD    Workup:  MRI brain + deonte: Revealed multiple supratentorial and infratentorial enhancing lesions with associated vasogenic edema the largest within the left occipital lobe consistent with metastases.  TTE: EF 60%, PFO with bidirectional shunting  Labs: Hemoglobin A1c 5.3 (7/30/2024),  (7/30/2024)  Routine EEG: Intermittent, left occipital predominant, mixed theta and delta slowing suggests left occipital focal neuronal dysfunction.(Pending official read)    Impression: Pt with known NSCLC s/p completion of chemoradiation earlier this month presenting with R sided instability/incoordination with transient episodic R visual field obscurities. MR  imaging revealing multiple infra/supratentorial enhancing lesions consistent with metastases. R sided motor symptoms resolved but continues to have episodic visual disturbances with examination consistent with right sided lower quadrantanopia localizing to the L occipital lobe where the largest of her lesions is located.    Plan:  Frequent Neuro Checks - STAT CTH for acute changes in exam  Seizure precautions   Can discontinue stroke pathway. Pursue can pursue normotension, permissive HTN not needed. Can continue home ASA  AEDs: Continue Keppra 750 mg BID  Routine EEG, pending  Steroids: Decadron 10 mg x1 followed by 4 mg QD, will defer to rad for future steroid treatment  Consults: Neurosurgery, Radiation Oncology, hematology oncology - appreciate recs  Multidisciplinary decision made for treatment with radiation therapy SRS will be started as an outpatient [facility] in the upcoming weeks  PT/OT/ST  Outpatient follow-up with epilepsy  [employer] form submitted and patient instructed to refrain from driving for at least the next 6 months due to concern for seizure activity as well as visual deficit. Seizure safety provided.

## 2024-07-31 NOTE — OCCUPATIONAL THERAPY NOTE
Occupational Therapy Screen Note     Patient Name: Ayla Nye  Today's Date: 7/31/2024 07/31/24 1118   OT Last Visit   OT Visit Date 07/31/24   Note Type   Note type Screen   Additional Comments OT consult received and chart reviewed. Entered patients room to introduce self and educate on role of acute care OT. Pt reports independently ambulating to/from the bathroom and around the room last night and this AM w/o any noted deficits. She reports being able to stand at the sink and complete grooming tasks for extended period of time w/o assistance. Pt declines a need for skilled OT services at this time as she is performing close to/or at her functional baseline. She expressess interest in having HHPT services at d/c to assist with stair negotation. Spoke with RN and CM. Will d/c from IPOT caseload. Please reconsult with any acute change in functional status.     Marta Clifton MS OTR/L   [place] Licensure# [id number]

## 2024-08-01 ENCOUNTER — TELEPHONE (OUTPATIENT)
Age: 74
End: 2024-08-01

## 2024-08-01 VITALS
DIASTOLIC BLOOD PRESSURE: 67 MMHG | WEIGHT: 103 LBS | HEIGHT: 62 IN | HEART RATE: 70 BPM | BODY MASS INDEX: 18.95 KG/M2 | SYSTOLIC BLOOD PRESSURE: 119 MMHG | OXYGEN SATURATION: 95 % | TEMPERATURE: 98.1 F | RESPIRATION RATE: 18 BRPM

## 2024-08-01 LAB
ERYTHROCYTE [DISTWIDTH] IN BLOOD BY AUTOMATED COUNT: 15.9 % (ref 11.6–15.1)
HCT VFR BLD AUTO: 30.8 % (ref 34.8–46.1)
HGB BLD-MCNC: 9.8 G/DL (ref 11.5–15.4)
MCH RBC QN AUTO: 32.2 PG (ref 26.8–34.3)
MCHC RBC AUTO-ENTMCNC: 31.8 G/DL (ref 31.4–37.4)
MCV RBC AUTO: 101 FL (ref 82–98)
PLATELET # BLD AUTO: 192 THOUSANDS/UL (ref 149–390)
PMV BLD AUTO: 8.9 FL (ref 8.9–12.7)
RBC # BLD AUTO: 3.04 MILLION/UL (ref 3.81–5.12)
WBC # BLD AUTO: 3.97 THOUSAND/UL (ref 4.31–10.16)

## 2024-08-01 PROCEDURE — 99239 HOSP IP/OBS DSCHRG MGMT >30: CPT | Performed by: INTERNAL MEDICINE

## 2024-08-01 PROCEDURE — 85027 COMPLETE CBC AUTOMATED: CPT

## 2024-08-01 RX ORDER — SULFAMETHOXAZOLE AND TRIMETHOPRIM 800; 160 MG/1; MG/1
1 TABLET ORAL EVERY 12 HOURS SCHEDULED
Qty: 60 TABLET | Refills: 0 | Status: SHIPPED | OUTPATIENT
Start: 2024-08-01 | End: 2024-08-31

## 2024-08-01 RX ORDER — PANTOPRAZOLE SODIUM 40 MG/1
40 TABLET, DELAYED RELEASE ORAL
Qty: 30 TABLET | Refills: 0 | Status: SHIPPED | OUTPATIENT
Start: 2024-08-01 | End: 2024-08-31

## 2024-08-01 RX ORDER — LEVETIRACETAM 750 MG/1
750 TABLET ORAL 2 TIMES DAILY
Qty: 60 TABLET | Refills: 0 | Status: SHIPPED | OUTPATIENT
Start: 2024-08-01 | End: 2024-08-31

## 2024-08-01 RX ORDER — DEXAMETHASONE 4 MG/1
4 TABLET ORAL DAILY
Qty: 30 TABLET | Refills: 0 | Status: SHIPPED | OUTPATIENT
Start: 2024-08-01 | End: 2024-08-31

## 2024-08-01 RX ORDER — ATORVASTATIN CALCIUM 40 MG/1
40 TABLET, FILM COATED ORAL EVERY EVENING
Qty: 30 TABLET | Refills: 0 | Status: SHIPPED | OUTPATIENT
Start: 2024-08-01 | End: 2024-08-31

## 2024-08-01 RX ADMIN — ASPIRIN 81 MG 81 MG: 81 TABLET ORAL at 08:52

## 2024-08-01 RX ADMIN — PANTOPRAZOLE SODIUM 40 MG: 40 TABLET, DELAYED RELEASE ORAL at 05:37

## 2024-08-01 RX ADMIN — LEVETIRACETAM 750 MG: 100 INJECTION, SOLUTION INTRAVENOUS at 08:53

## 2024-08-01 RX ADMIN — DEXAMETHASONE 4 MG: 4 TABLET ORAL at 08:53

## 2024-08-01 NOTE — ASSESSMENT & PLAN NOTE
Home regimen-none  Systolic blood pressure in the 160s    Plan  Permissive hypertension for possible stroke < 180  Follow up with PCP and neurology

## 2024-08-01 NOTE — TELEPHONE ENCOUNTER
Called [facility] CT to see about moving up Ayla's PET on 8/23 so that it is done before her follow up appt on 8/16 with Dr Castro. Left a message on their voicemail requesting a call back to let me know if this is do-able.

## 2024-08-01 NOTE — DISCHARGE SUMMARY
Replaced by [facility]  Discharge- Ayla Nye 1950, 73 y.o. female MRN: [id number]  Unit/Bed#: S -[facility] Encounter: [id number]  Primary Care Provider: Rafy Angelo MD   Date and time admitted to hospital: 7/29/2024  7:26 PM    * Metastatic cancer to brain (HCC)  Assessment & Plan  1913 patient presents today with visual changes, flashing lights and wide-based gait  Cerebellar symptoms noted-Romanenko  And abnormal finger-nose test  1955 patient is a stroke alert NIHS score 2  CTA head and neck-no  LVO-  CT head  Small area of hypoattenuation in the left superior frontal lobe and larger area in the left posterior parietal region, suggestive of vasogenic edema. Findings are concerning for occult underlying metastases. Acute infarcts cannot be excluded however   infarcts in 2 vascular territories and with findings suggesting subacute infarcts, not agreement with the clinical timeline suggest stroke is less likely.     Also concern for possible seizure given right eye visual changes and occipital lesions in the brain    Decision made-no TNK candidate given unclear imaging findings, stroke versus metastatic lesions  Keppra loaded at the ED and given aspirin load    MRI: 1. Multiple supratentorial and infratentorial enhancing lesions with associated vasogenic edema, the largest of which is in the left occipital lobe. Findings are most consistent with metastases.  2. No acute infarct or midline shift.    Plan  Neurology consulted; following  Stroke pathway  Permissive hypertension less than 180  Continue aspirin  Started decadron (10mg loaded, followed by 4mg daily) for vasogenic edema with midline shift  Continue with Keppra 750 twice daily  Neurosurgery consult- no surgical intervention at this time.  Oncology & Radiation oncology consulted consulted and will follow up outpatient     Malignant neoplasm metastatic to brain (HCC)  Assessment & Plan  Assessment  -5 new lesions on  MRI    Plan:  Outpatient oncology follow up in 2-3 weeks  Radiation oncology treatment schedule next week      Brain mass  Assessment & Plan  Assessment  MRI: 1. Multiple supratentorial and infratentorial enhancing lesions with associated vasogenic edema, the largest of which is in the left occipital lobe. Findings are most consistent with metastases.     2. No acute infarct or midline shift    Plan  -Radiation oncology will treat focally outpatient         Malignant neoplasm of upper lobe, right bronchus or lung (HCC)  Assessment & Plan  Patient with history of right primary lung adenocarcinoma (non-small cell carcinoma) diagnosed in 2024  Patient is on concurrent chemoradiation with Taxol and carboplatin.  Last treatment 7/2  One radiation treatment given here.  7/3 CT C/A/P: interval progression of neoplastic disease -findings inconclusive given patient is also on active treatment.  Plan is to get a PET/CT August 2024 to determine next treatment option possibly immunotherapy  Patient presents today with strokelike symptoms and CT head showing vasogenic edema and findings concerning for possible metastatic disease  Repeat MRI findings consistent with metastases, oncology follow up and radiation oncology will treat    CKD (chronic kidney disease) stage 3, GFR 30-59 ml/min (HCC)  Assessment & Plan  Lab Results   Component Value Date    EGFR 69 07/31/2024    EGFR 50 07/29/2024    EGFR 56 07/29/2024    CREATININE 0.84 07/31/2024    CREATININE 1.09 07/29/2024    CREATININE 1.00 07/29/2024   At baseline,  avoid NSAIDs     Hypertension  Assessment & Plan  Home regimen-none  Systolic blood pressure in the 160s    Plan  Permissive hypertension for possible stroke < 180  Follow up with PCP and neurology     Essential thrombocytosis (HCC)  Assessment & Plan  Follows with heme-onc   Will schedule an appointment in 2-3 weeks on dc      Medical Problems       Resolved Problems  Date Reviewed: 7/16/2024   None       Discharging  Resident: Jelena Gamble MD  Discharging Attending: No att. providers found  PCP: Rafy Angelo MD  Admission Date:   Admission Orders (From admission, onward)       Ordered        07/29/24 2110  INPATIENT ADMISSION  Once                          Discharge Date: 08/01/24    Consultations During Hospital Stay:  Neurosurgery  Radiation oncology  Urology  Oncology    Procedures Performed:   EEG    Significant Findings / Test Results:   MRI findings consistent with metastases in the brain.    Incidental Findings:   None as all findings are related to primary reason for hospitalization.  Completed incidental finding however technically finding is related to reason for hospital stay.  Patient is aware.    Test Results Pending at Discharge (will require follow up):  None     Outpatient Tests Requested:  None    Complications: None    Reason for Admission: Balance changes and vision changes.    Hospital Course:   Ayla Nye is a 73 y.o. female patient with a PMH of non-small cell lung cancer on concurrent chemoradiation, hypertension who originally presented to the hospital on 7/29/2024 due to abnormal balance, flushing monitoring the right eye.  Patient also experienced trouble finding words and an inability to grasp things because she was not estimating their exact location very well.  She also had an abnormal finger-to-nose.  Patient arrived to the ED 2 hours after the symptoms started and a stroke alert was called.  NIH score 2. CTA head neck showed no LVO.  CTH l 2 small areas of hypoattenuation in left superior frontal lobe and nodular area in the left posterior parietal region suggestive of vasogenic edema. Decision made not to give TNK given possibly underlying malignancy or metastatic disease. Patient admitted under stroke pathway and also further evaluation for possible brain metastasis. Neurology following.  Patient was also loaded with Keppra and aspirin.  She was started on a statin.  On hospital  Day 1 Patient underwent an MRI which showed: 1. Multiple supratentorial and infratentorial enhancing lesions with associated vasogenic edema, the largest of which is in the left occipital lobe. Findings are most consistent with metastases. 2. No acute infarct or midline shift.  Neurosurgery, radiation oncology, and oncology were all consulted.  Patient was started on steroids for treatment of the vasogenic edema. Patient still was trepidations while walking continued to have some word finding problems especially when excited and continued to have deficiency in her peripheral vision.  However overall patient was able to ambulate complete heel-to-shin and walk heel-to-toe.  She was much improved from admission.  Per neurosurgery patient is not a candidate for surgical intervention at this time.  She will be treated by radiation oncology focally.  She will also follow up with her oncologist in 2 to 3 weeks for imaging and progression characterization.  Neurology ordered an EEG.  On hospital day 2 patient completed her EEG.  Her symptoms were further improved especially in gait and confidence during ambulation she continued to experience loss of peripheral vision but no longer had any other vision changes.  On hospital day 3 patient was very close to baseline still has intermittent problems finding words but does otherwise doing well.  Discharged home with follow-ups with oncology, radiation oncology and urology.    Please see above list of diagnoses and related plan for additional information.     Condition at Discharge: good    Discharge Day Visit / Exam:   Subjective: Patient seen resting and in bed this morning.  No acute events overnight.  Patient states that she is ambulating much more confidently.  Continues to have some minor difficulties finding words occasionally but overall patient feels much improved from admission.  Denies any chest pain SOB nausea vomiting and palpitations.  Vitals: Blood Pressure: 119/67  "(08/01/24 1500)  Pulse: 70 (08/01/24 1500)  Temperature: 98.1 °F (36.7 °C) (08/01/24 1500)  Temp Source: Oral (08/01/24 0904)  Respirations: 18 (07/31/24 2158)  Height: 5' 2\" (157.5 cm) (07/30/24 1117)  Weight - Scale: 46.7 kg (103 lb) (07/30/24 1117)  SpO2: 95 % (08/01/24 1500)  Exam:   Physical Exam  Vitals reviewed.   Constitutional:       Appearance: She is normal weight.   HENT:      Head: Normocephalic and atraumatic.      Nose: No congestion or rhinorrhea.      Mouth/Throat:      Mouth: Mucous membranes are moist.      Pharynx: No oropharyngeal exudate or posterior oropharyngeal erythema.   Eyes:      General: No scleral icterus.        Right eye: No discharge.         Left eye: No discharge.      Extraocular Movements: Extraocular movements intact.      Conjunctiva/sclera: Conjunctivae normal.      Comments: Right-sided peripheral vision loss   Cardiovascular:      Rate and Rhythm: Normal rate and regular rhythm.      Pulses: Normal pulses.      Heart sounds: Normal heart sounds. No murmur heard.     No friction rub. No gallop.   Pulmonary:      Effort: Pulmonary effort is normal. No respiratory distress.      Breath sounds: No stridor. No wheezing, rhonchi or rales.   Chest:      Chest wall: No tenderness.   Abdominal:      General: Abdomen is flat. Bowel sounds are normal. There is no distension.      Palpations: Abdomen is soft. There is no mass.      Tenderness: There is no abdominal tenderness. There is no guarding or rebound.      Hernia: No hernia is present.   Musculoskeletal:         General: No swelling, tenderness, deformity or signs of injury. Normal range of motion.      Right lower leg: No edema.      Left lower leg: No edema.   Skin:     General: Skin is warm.      Coloration: Skin is not jaundiced or pale.      Findings: No bruising, erythema, lesion or rash.   Neurological:      General: No focal deficit present.      Mental Status: She is alert and oriented to person, place, and time. " Mental status is at baseline.      Motor: Weakness present.      Coordination: Coordination normal.      Gait: Gait normal.   Psychiatric:         Mood and Affect: Mood normal.         Behavior: Behavior normal.         Thought Content: Thought content normal.         Judgment: Judgment normal.        Discussion with Family:  Patient's family arrived to take her home..     Discharge instructions/Information to patient and family:   See after visit summary for information provided to patient and family.      Provisions for Follow-Up Care:  See after visit summary for information related to follow-up care and any pertinent home health orders.      Mobility at time of Discharge:   Basic Mobility Inpatient Raw Score: 20  JH-HLM Goal: 6: Walk 10 steps or more  JH-HLM Achieved: 6: Walk 10 steps or more  HLM Goal achieved. Continue to encourage appropriate mobility.     Disposition:   Home    Planned Readmission: No.     Discharge Medications:  See after visit summary for reconciled discharge medications provided to patient and/or family.      **Please Note: This note may have been constructed using a voice recognition system**

## 2024-08-01 NOTE — ASSESSMENT & PLAN NOTE
1913 patient presents today with visual changes, flashing lights and wide-based gait  Cerebellar symptoms noted-Romanenko  And abnormal finger-nose test  1955 patient is a stroke alert NIHS score 2  CTA head and neck-no  LVO-  CT head  Small area of hypoattenuation in the left superior frontal lobe and larger area in the left posterior parietal region, suggestive of vasogenic edema. Findings are concerning for occult underlying metastases. Acute infarcts cannot be excluded however   infarcts in 2 vascular territories and with findings suggesting subacute infarcts, not agreement with the clinical timeline suggest stroke is less likely.     Also concern for possible seizure given right eye visual changes and occipital lesions in the brain    Decision made-no TNK candidate given unclear imaging findings, stroke versus metastatic lesions  Keppra loaded at the ED and given aspirin load    MRI: 1. Multiple supratentorial and infratentorial enhancing lesions with associated vasogenic edema, the largest of which is in the left occipital lobe. Findings are most consistent with metastases.  2. No acute infarct or midline shift.    Plan  Neurology consulted; following  Stroke pathway  Permissive hypertension less than 180  Continue aspirin  Started decadron (10mg loaded, followed by 4mg daily) for vasogenic edema with midline shift  Continue with Keppra 750 twice daily  Neurosurgery consult- no surgical intervention at this time.  Oncology & Radiation oncology consulted consulted and will follow up outpatient

## 2024-08-01 NOTE — ASSESSMENT & PLAN NOTE
Patient with history of right primary lung adenocarcinoma (non-small cell carcinoma) diagnosed in 2024  Patient is on concurrent chemoradiation with Taxol and carboplatin.  Last treatment 7/2  One radiation treatment given here.  7/3 CT C/A/P: interval progression of neoplastic disease -findings inconclusive given patient is also on active treatment.  Plan is to get a PET/CT August 2024 to determine next treatment option possibly immunotherapy  Patient presents today with strokelike symptoms and CT head showing vasogenic edema and findings concerning for possible metastatic disease  Repeat MRI findings consistent with metastases, oncology follow up and radiation oncology will treat

## 2024-08-01 NOTE — ASSESSMENT & PLAN NOTE
Lab Results   Component Value Date    EGFR 69 07/31/2024    EGFR 50 07/29/2024    EGFR 56 07/29/2024    CREATININE 0.84 07/31/2024    CREATININE 1.09 07/29/2024    CREATININE 1.00 07/29/2024   At baseline,  avoid NSAIDs

## 2024-08-01 NOTE — INCIDENTAL FINDINGS
The following findings require follow up:  Radiographic finding   Finding: MRI brain w wo contrast: 1. Multiple supratentorial and infratentorial enhancing lesions with associated vasogenic edema, the largest of which is in the left occipital lobe. Findings are most consistent with metastases., 2. No acute infarct or midline shift., The study was marked in EPIC for immediate notification., Workstation performed: [id number]    Follow up required: Yes, With Oncology, Radiation Oncology    Follow up should be done within 1-2  week(s)    Please notify the following clinician to assist with the follow up:   Dr. East.    **Instructions will also be in discharge paperwork; this is not technically an incidental finding as it is related to hospital stay.    Incidental finding results were discussed with the Patient by Jelena Gamble MD on 08/01/24.   They expressed understanding and all questions answered.

## 2024-08-02 ENCOUNTER — RADIATION THERAPY TREATMENT (OUTPATIENT)
Dept: RADIATION ONCOLOGY | Facility: CLINIC | Age: 74
End: 2024-08-02
Attending: RADIOLOGY
Payer: MEDICARE

## 2024-08-02 ENCOUNTER — TRANSITIONAL CARE MANAGEMENT (OUTPATIENT)
Age: 74
End: 2024-08-02

## 2024-08-02 ENCOUNTER — TELEPHONE (OUTPATIENT)
Age: 74
End: 2024-08-02

## 2024-08-02 PROCEDURE — 77334 RADIATION TREATMENT AID(S): CPT | Performed by: RADIOLOGY

## 2024-08-02 PROCEDURE — 77470 SPECIAL RADIATION TREATMENT: CPT | Performed by: RADIOLOGY

## 2024-08-02 NOTE — TELEPHONE ENCOUNTER
Radiology calling.    HIPAA of patient verified.    To inform Dr Castro the PET scan has been rescheduled sooner per her request to 8/5 at 7am

## 2024-08-04 ENCOUNTER — HOME CARE VISIT (OUTPATIENT)
Dept: HOME HEALTH SERVICES | Facility: HOME HEALTHCARE | Age: 74
End: 2024-08-04
Payer: MEDICARE

## 2024-08-04 VITALS
HEART RATE: 83 BPM | SYSTOLIC BLOOD PRESSURE: 106 MMHG | DIASTOLIC BLOOD PRESSURE: 64 MMHG | RESPIRATION RATE: 16 BRPM | TEMPERATURE: 97.4 F | OXYGEN SATURATION: 98 %

## 2024-08-04 PROCEDURE — 400013 VN SOC

## 2024-08-04 PROCEDURE — G0299 HHS/HOSPICE OF RN EA 15 MIN: HCPCS

## 2024-08-04 PROCEDURE — 10330081 VN NO-PAY CLAIM PROCEDURE

## 2024-08-05 ENCOUNTER — HOSPITAL ENCOUNTER (OUTPATIENT)
Dept: RADIOLOGY | Age: 74
Discharge: HOME | End: 2024-08-05
Payer: MEDICARE

## 2024-08-05 ENCOUNTER — OFFICE VISIT (OUTPATIENT)
Age: 74
End: 2024-08-05
Payer: MEDICARE

## 2024-08-05 VITALS
RESPIRATION RATE: 16 BRPM | HEART RATE: 74 BPM | HEIGHT: 62 IN | TEMPERATURE: 97.3 F | OXYGEN SATURATION: 99 % | WEIGHT: 105.2 LBS | SYSTOLIC BLOOD PRESSURE: 120 MMHG | DIASTOLIC BLOOD PRESSURE: 64 MMHG | BODY MASS INDEX: 19.36 KG/M2

## 2024-08-05 DIAGNOSIS — I10 PRIMARY HYPERTENSION: ICD-10-CM

## 2024-08-05 DIAGNOSIS — C76.1 MALIGNANT NEOPLASM OF THORAX (HCC): ICD-10-CM

## 2024-08-05 DIAGNOSIS — C34.91 NSCLC OF RIGHT LUNG (HCC): ICD-10-CM

## 2024-08-05 DIAGNOSIS — E78.2 MIXED HYPERLIPIDEMIA: ICD-10-CM

## 2024-08-05 DIAGNOSIS — C79.31 METASTATIC CANCER TO BRAIN (HCC): Primary | ICD-10-CM

## 2024-08-05 PROBLEM — N18.30 CKD (CHRONIC KIDNEY DISEASE) STAGE 3, GFR 30-59 ML/MIN (HCC): Status: RESOLVED | Noted: 2024-02-22 | Resolved: 2024-08-05

## 2024-08-05 LAB — GLUCOSE SERPL-MCNC: 78 MG/DL (ref 65–140)

## 2024-08-05 PROCEDURE — 82948 REAGENT STRIP/BLOOD GLUCOSE: CPT

## 2024-08-05 PROCEDURE — A9552 F18 FDG: HCPCS

## 2024-08-05 PROCEDURE — 99215 OFFICE O/P EST HI 40 MIN: CPT

## 2024-08-05 PROCEDURE — 78815 PET IMAGE W/CT SKULL-THIGH: CPT

## 2024-08-05 NOTE — PROGRESS NOTES
Assessment/Plan:         Problem List Items Addressed This Visit     Hypertension     Continue monitoring blood pressure at home.  No antihypertensive medication at this point.  Unless patient blood pressure consistently staying elevated we will not prescribe antihypertensive at this point.  We will continue to monitor.         Mixed hyperlipidemia     Under control with diet and exercise.  We will continue to monitor         Metastatic cancer to brain (HCC) - Primary     Patient has been followed by radiation oncologist and regular oncologist              Subjective:      Patient ID: Ayla Nye is a 73 y.o. female.    Patient here for transitional care management as patient was hospitalized on July 29, 2024 and discharged on August 1, 2024 at [facility] as patient had mental status changes and detected to have metastasis to the brain had a PET scan done recently which also reviewed with patient all the records from the hospital reviewed patient EEG did not see any seizure according to patient.  Currently patient is doing well.  Denies any headache, visual disturbance, lightheadedness or dizziness.  No tingling numbness or weakness.  Denies any chest pain or shortness of breath.  No fever or chills.  No abdominal pain, nausea, vomiting, lightheadedness or dizziness.  No bowel or bladder problem.  No other new problem.  Patient has an appointment with radiation oncology and her oncologist also        The following portions of the patient's history were reviewed and updated as appropriate:   Past Medical History:  She has a past medical history of Allergic (2022), Cataract (Nov. 2023), Essential thrombocytosis (HCC), Hyperlipidemia, Hypertension, Mass in chest, Nodular goiter, Pneumonia (Oct 16, 2023), Psoriasis, and SIRS (systemic inflammatory response syndrome) (HCC) (06/22/2024).,  _______________________________________________________________________  Medical Problems:  does not  have any pertinent problems on file.,  _______________________________________________________________________  Past Surgical History:   has a past surgical history that includes Appendectomy; Mammo needle localization right (all inc) (Right, 07/13/2009); Skin lesion excision; Colonoscopy (10/31/2018); Mammo (historical); DXA procedure(historical) (04/21/2017); and Breast cyst excision (Right, 2009).,  _______________________________________________________________________  Family History:  family history includes Cancer in her brother and brother; Coronary artery disease in her brother; Depression in her mother; Hearing loss in her mother; Hypertension in her brother and mother; No Known Problems in her daughter, daughter, maternal aunt, maternal aunt, maternal aunt, maternal aunt, maternal grandfather, maternal grandmother, paternal aunt, paternal grandfather, and paternal grandmother; Stroke in her mother; Tuberculosis in her brother and father.,  _______________________________________________________________________  Social History:   reports that she has quit smoking. Her smoking use included cigarettes. She started smoking about 58 years ago. She has a 58.6 pack-year smoking history. She has been exposed to tobacco smoke. She has never used smokeless tobacco. She reports that she does not currently use alcohol. She reports that she does not use drugs.,  _______________________________________________________________________  Allergies:  is allergic to doxycycline, keflex [cephalexin], and neomycin-polymyxin-dexameth..  _______________________________________________________________________  Current Outpatient Medications   Medication Sig Dispense Refill   • aspirin 81 MG tablet Take 81 mg by mouth daily      • atorvastatin (LIPITOR) 40 mg tablet Take 1 tablet (40 mg total) by mouth every evening 30 tablet 0   • Cholecalciferol (VITAMIN D3) 5000 units TABS Take 5,000 Units by mouth Daily     • dexamethasone  (DECADRON) 4 mg tablet Take 1 tablet (4 mg total) by mouth daily 30 tablet 0   • diphenhydrAMINE (BENADRYL) 25 mg capsule Take 25 mg by mouth every 12 (twelve) hours as needed for itching     • levETIRAcetam (Keppra) 750 mg tablet Take 1 tablet (750 mg total) by mouth 2 (two) times a day 60 tablet 0   • pantoprazole (PROTONIX) 40 mg tablet Take 1 tablet (40 mg total) by mouth daily in the early morning 30 tablet 0   • Probiotic Product (PROBIOTIC DAILY PO) Take 1 capsule by mouth daily     • sulfamethoxazole-trimethoprim (BACTRIM DS) 800-160 mg per tablet Take 1 tablet by mouth every 12 (twelve) hours 60 tablet 0   • vitamin B-12 (VITAMIN B-12) 1,000 mcg tablet Take 1 tablet (1,000 mcg total) by mouth daily 90 tablet 1     No current facility-administered medications for this visit.     _______________________________________________________________________  Review of Systems   Constitutional:  Positive for fatigue. Negative for chills and fever.   HENT:  Negative for congestion, ear pain, rhinorrhea, sneezing and sore throat.    Eyes:  Negative for redness, itching and visual disturbance.   Respiratory:  Negative for cough, chest tightness and shortness of breath.    Cardiovascular:  Negative for chest pain, palpitations and leg swelling.   Gastrointestinal:  Negative for abdominal pain, blood in stool, diarrhea, nausea and vomiting.   Endocrine: Negative for cold intolerance and heat intolerance.   Genitourinary:  Negative for dysuria, frequency and urgency.   Musculoskeletal:  Negative for arthralgias, back pain and myalgias.   Skin:  Negative for color change and rash.   Neurological:  Negative for dizziness, weakness, light-headedness, numbness and headaches.   Hematological:  Does not bruise/bleed easily.   Psychiatric/Behavioral:  Negative for agitation, behavioral problems and confusion.          Objective:  Vitals:    08/05/24 1129   BP: 120/64   BP Location: Left arm   Patient Position: Sitting   Cuff  "Size: Standard   Pulse: 74   Resp: 16   Temp: (!) 97.3 °F (36.3 °C)   TempSrc: Tympanic   SpO2: 99%   Weight: 47.7 kg (105 lb 3.2 oz)   Height: 5' 2\" (1.575 m)     Body mass index is 19.24 kg/m².     Physical Exam  Vitals and nursing note reviewed. Exam conducted with a chaperone present (Daughter).   Constitutional:       General: She is not in acute distress.     Appearance: Normal appearance. She is not ill-appearing, toxic-appearing or diaphoretic.   HENT:      Head: Normocephalic and atraumatic.      Nose: Nose normal. No congestion.      Mouth/Throat:      Mouth: Mucous membranes are moist.   Eyes:      General: No scleral icterus.        Right eye: No discharge.         Left eye: No discharge.      Extraocular Movements: Extraocular movements intact.      Conjunctiva/sclera: Conjunctivae normal.      Pupils: Pupils are equal, round, and reactive to light.   Cardiovascular:      Rate and Rhythm: Normal rate and regular rhythm.      Pulses: Normal pulses.      Heart sounds: Normal heart sounds. No murmur heard.     No gallop.   Pulmonary:      Effort: Pulmonary effort is normal. No respiratory distress.      Breath sounds: Normal breath sounds. No wheezing, rhonchi or rales.   Abdominal:      General: Abdomen is flat. Bowel sounds are normal. There is no distension.      Palpations: Abdomen is soft.      Tenderness: There is no abdominal tenderness. There is no guarding.   Musculoskeletal:         General: No swelling or tenderness. Normal range of motion.      Cervical back: Normal range of motion and neck supple. No rigidity.      Right lower leg: No edema.      Left lower leg: No edema.   Lymphadenopathy:      Cervical: No cervical adenopathy.   Skin:     General: Skin is warm.      Capillary Refill: Capillary refill takes 2 to 3 seconds.      Coloration: Skin is not jaundiced.      Findings: No bruising or rash.   Neurological:      General: No focal deficit present.      Mental Status: She is alert and " oriented to person, place, and time. Mental status is at baseline.      Gait: Gait normal.   Psychiatric:         Mood and Affect: Mood normal.         TCM Call     Date and time call was made  8/2/2024  8:38 AM    Hospital care reviewed  Records reviewed    Patient was hospitialized at  [facility]    Date of Admission  07/29/24    Date of discharge  08/01/24    Diagnosis  Metastatic cancer to brain    Disposition  Home    Current Symptoms  None      TCM Call     Post hospital issues  None    Should patient be enrolled in anticoag monitoring?  No    Scheduled for follow up?  Yes    Did you obtain your prescribed medications  Yes    Do you need help managing your prescriptions or medications  No    Is transportation to your appointment needed  No    I have advised the patient to call PCP with any new or worsening symptoms  Marta Vasquez MA

## 2024-08-05 NOTE — LETTER
[facility]  [address] [address]      August 8, 2024    MRN: [id number]     Phone: [phone]     Dear Ms. Nye,    You recently had a(n) Nuclear Medicine performed on 8/5/2024 at  [facility] that was requested by Rosalinda Castro MD. The study was reviewed by a radiologist, which is a physician who specializes in medical imaging. The radiologist issued a report describing his or her findings. In that report there was a finding that the radiologist felt warranted further discussion with your health care provider and that discussion would be beneficial to you.      The results were sent to Rosalinda Castro MD on 08/05/2024 11:01 AM. We recommend that you contact Rosalinda Castro MD at [phone] or set up an appointment to discuss the results of the imaging test. If you have already heard from Rosalinda Castro MD regarding the results of your study, you can disregard this letter.     This letter is not meant to alarm you, but intended to encourage you to follow-up on your results with the provider that sent you for the imaging study. In addition, we have enclosed answers to frequently asked questions by other patients who have also received a letter to review results with their health care provider (see page two).      Thank you for choosing [facility] for your medical imaging needs.                                                                                                                                                        FREQUENTLY ASKED QUESTIONS    Why am I receiving this letter?  [place] State Law requires us to notify patients who have findings on imaging exams that may require more testing or follow-up with a health professional within the next 3 months.        How serious is the finding on the imaging test?  This letter is sent to all patients who may need follow-up or more testing within the next 3  months.  Receiving this letter does not necessarily mean you have a life-threatening imaging finding or disease.  Recommendations in the radiologist’s imaging report are general in nature and it is up to your healthcare provider to say whether those recommendations make sense for your situation.  You are strongly encouraged to talk to your health care provider about the results and ask whether additional steps need to be taken.    Where can I get a copy of the final report for my recent radiology exam?  To get a full copy of the report you can access your records online at [email or web address] or please contact [facility]’s Medical Records Department at [phone] Monday through Friday between 8 am and 6 pm.         What do I need to do now?           Please contact your health care provider who requested the imaging study to discuss what further actions (if any) are needed.  You may have already heard from (your ordering provider) in regard to this test in which case you can disregard this letter.        NOTICE IN ACCORDANCE WITH THE [place] STATE “PATIENT TEST RESULT INFORMATION ACT OF 2018”    You are receiving this notice as a result of a determination by your diagnostic imaging service that further discussions of your test results are warranted and would be beneficial to you.    The complete results of your test or tests have been or will be sent to the health care practitioner that ordered the test or tests. It is recommended that you contact your health care practitioner to discuss your results as soon as possible.

## 2024-08-06 ENCOUNTER — TELEPHONE (OUTPATIENT)
Age: 74
End: 2024-08-06

## 2024-08-06 ENCOUNTER — HOME CARE VISIT (OUTPATIENT)
Dept: HOME HEALTH SERVICES | Facility: HOME HEALTHCARE | Age: 74
End: 2024-08-06
Payer: MEDICARE

## 2024-08-06 VITALS
OXYGEN SATURATION: 97 % | SYSTOLIC BLOOD PRESSURE: 122 MMHG | RESPIRATION RATE: 18 BRPM | DIASTOLIC BLOOD PRESSURE: 78 MMHG | HEART RATE: 70 BPM | TEMPERATURE: 97.7 F

## 2024-08-06 PROCEDURE — 77300 RADIATION THERAPY DOSE PLAN: CPT | Performed by: INTERNAL MEDICINE

## 2024-08-06 PROCEDURE — 77338 DESIGN MLC DEVICE FOR IMRT: CPT | Performed by: INTERNAL MEDICINE

## 2024-08-06 PROCEDURE — G0299 HHS/HOSPICE OF RN EA 15 MIN: HCPCS

## 2024-08-06 PROCEDURE — 77301 RADIOTHERAPY DOSE PLAN IMRT: CPT | Performed by: INTERNAL MEDICINE

## 2024-08-06 NOTE — TELEPHONE ENCOUNTER
Patient called in. She has her first radiation treatment tomorrow and is questioning if she has any restrictions such as holding her medication the day of treatment. Informed patient she has no restrictions for her first treatment tomorrow. Pt verbalized understanding.

## 2024-08-06 NOTE — TELEPHONE ENCOUNTER
Patient called in to have appointment scheduled for hospital follow up.     Scheduled with Shayna at [facility] on 09/17/24        [facility] Malignant neoplasm metastatic to brain  Patient DC : 8/1/2024 (Home/Self Care Home     Ayla Nye will need follow up in in 6 weeks with epilepsy AP/Attending . She will not require outpatient neurological testing.   Pending for discharge: Clear to discharge once EEG results

## 2024-08-08 ENCOUNTER — APPOINTMENT (OUTPATIENT)
Dept: RADIATION ONCOLOGY | Facility: CLINIC | Age: 74
End: 2024-08-08
Attending: RADIOLOGY
Payer: MEDICARE

## 2024-08-08 DIAGNOSIS — C34.91 NSCLC OF RIGHT LUNG (HCC): ICD-10-CM

## 2024-08-08 DIAGNOSIS — C79.31 METASTASIS TO BRAIN (HCC): Primary | ICD-10-CM

## 2024-08-08 PROCEDURE — 77372 SRS LINEAR BASED: CPT | Performed by: STUDENT IN AN ORGANIZED HEALTH CARE EDUCATION/TRAINING PROGRAM

## 2024-08-08 PROCEDURE — 77336 RADIATION PHYSICS CONSULT: CPT | Performed by: STUDENT IN AN ORGANIZED HEALTH CARE EDUCATION/TRAINING PROGRAM

## 2024-08-08 PROCEDURE — 77432 STEREOTACTIC RADIATION TRMT: CPT | Performed by: STUDENT IN AN ORGANIZED HEALTH CARE EDUCATION/TRAINING PROGRAM

## 2024-08-08 RX ORDER — DEXAMETHASONE 1 MG
TABLET ORAL
Qty: 35 TABLET | Refills: 0 | Status: SHIPPED | OUTPATIENT
Start: 2024-08-08 | End: 2024-08-23

## 2024-08-09 ENCOUNTER — TELEPHONE (OUTPATIENT)
Dept: RADIATION ONCOLOGY | Facility: CLINIC | Age: 74
End: 2024-08-09

## 2024-08-09 ENCOUNTER — HOME CARE VISIT (OUTPATIENT)
Dept: HOME HEALTH SERVICES | Facility: HOME HEALTHCARE | Age: 74
End: 2024-08-09
Payer: MEDICARE

## 2024-08-09 NOTE — TELEPHONE ENCOUNTER
Post SRS phone follow up: SRS 8/8/24    C/O headache last night. Tylenol effective. Headache resolved completely this morning.  She denies unsteadiness with walking, changes in vision, N/V, seizure, LOC, new weakness and numbness in extremities. She was instructed to call if she develops any of these symptoms.   Reviewed appointment details for MRI and follow up with Dr. Gamboa in October 2024.

## 2024-08-12 ENCOUNTER — HOME CARE VISIT (OUTPATIENT)
Dept: HOME HEALTH SERVICES | Facility: HOME HEALTHCARE | Age: 74
End: 2024-08-12
Payer: MEDICARE

## 2024-08-12 ENCOUNTER — DOCUMENTATION (OUTPATIENT)
Dept: HEMATOLOGY ONCOLOGY | Facility: CLINIC | Age: 74
End: 2024-08-12

## 2024-08-12 NOTE — PROGRESS NOTES
Received Paperwork   What type of form JONNATHAN   Scanned blank form into patient's Epic chart Yes   Method received form  Mail   Provider responsible for form Dr. Castro / JONNATHAN   Informed patient our office turn around time for completing patient forms is 5-7 business days. Yes, informed patient of turn around time     Comments Patient would like it mailed to her or fax it to her employer. A self addressed stamped envelope was also mailed and is at the

## 2024-08-13 ENCOUNTER — HOME CARE VISIT (OUTPATIENT)
Dept: HOME HEALTH SERVICES | Facility: HOME HEALTHCARE | Age: 74
End: 2024-08-13
Payer: MEDICARE

## 2024-08-13 ENCOUNTER — TELEPHONE (OUTPATIENT)
Dept: HEMATOLOGY ONCOLOGY | Facility: CLINIC | Age: 74
End: 2024-08-13

## 2024-08-13 VITALS — DIASTOLIC BLOOD PRESSURE: 80 MMHG | SYSTOLIC BLOOD PRESSURE: 110 MMHG | HEART RATE: 63 BPM | OXYGEN SATURATION: 98 %

## 2024-08-13 PROCEDURE — G0152 HHCP-SERV OF OT,EA 15 MIN: HCPCS

## 2024-08-13 PROCEDURE — G0180 MD CERTIFICATION HHA PATIENT: HCPCS | Performed by: INTERNAL MEDICINE

## 2024-08-13 NOTE — TELEPHONE ENCOUNTER
I spoke with patient today  She will discuss symptoms further with Dr. Castro at her appointment on 8/16/24.  Additional testing can be ordered at time of visit depending on discussion    Patient agreeable and verbalized understanding

## 2024-08-13 NOTE — TELEPHONE ENCOUNTER
Left voicemail for Sue regarding need for blank FMLA forms to be resent as she filled out the portion for the healthcare provider.  Advised her that forms will be filled out once new ones are received.  Provided Right fax number and hopeline.

## 2024-08-15 ENCOUNTER — OFFICE VISIT (OUTPATIENT)
Dept: RADIATION ONCOLOGY | Facility: HOSPITAL | Age: 74
End: 2024-08-15
Attending: INTERNAL MEDICINE

## 2024-08-15 DIAGNOSIS — C79.31 METASTASIS TO BRAIN (HCC): Primary | ICD-10-CM

## 2024-08-15 DIAGNOSIS — C34.91 NSCLC OF RIGHT LUNG (HCC): ICD-10-CM

## 2024-08-15 PROCEDURE — 99024 POSTOP FOLLOW-UP VISIT: CPT | Performed by: INTERNAL MEDICINE

## 2024-08-15 NOTE — PROGRESS NOTES
HEMATOLOGY / ONCOLOGY CLINIC FOLLOW UP NOTE    Patient Ayla Nye  MRN: [id number]  : 1950  Date of Encounter 2024      Reason for Encounter: follow up ET and new lung mass on CRT C6 2024     C1 2024  C2  2024  C3 2024  C4 2024  C5 2024  C6 given 2024 at [place] f/b hospitalization for neutropenic fever    First line metastatic treatment    Carboplatin AUC 5 Pemetrexed 500 mg/m2 and Pembrolizumab 200 mg IV every 3 weeks x 4 cycles    C1           Oncology History   Malignant neoplasm of upper lobe, right bronchus or lung (HCC)    Initial Diagnosis    Primary lung adenocarcinoma, right (HCC)     3/26/2024 Biopsy    A-C. Lymph Node, Level 4R :    - Metastatic non-small cell carcinoma, most compatible with a primary lung adenocarcinoma; see note.       D-F. Lymph Node, Level 10R:    - Metastatic non-small cell carcinoma; see note.       G-I. Lymph Node, Level 4L:    - Metastatic non-small cell carcinoma; see note.       4/15/2024 -  Cancer Staged    Staging form: Lung, AJCC 8th Edition  - Clinical stage from 4/15/2024: Stage IIIB (cT2b, cN3, cM0) - Signed by Rogelio Beebe DO on 4/15/2024       2024 - 2024 Chemotherapy    alteplase (CATHFLO), 2 mg, Intracatheter, Every 1 Minute as needed, 6 of 6 cycles  CARBOplatin (PARAPLATIN) IVPB (GOG AUC DOSING), 130.4 mg, Intravenous, Once, 6 of 6 cycles  Administration: 130.4 mg (2024), 144.8 mg (2024), 138.4 mg (2024), 144.8 mg (2024), 132 mg (2024), 143.6 mg (2024)  PACLItaxel (TAXOL) chemo IVPB, 45 mg/m2 = 67.2 mg (90 % of original dose 50 mg/m2), Intravenous, Once, 6 of 6 cycles  Dose modification: 45 mg/m2 (original dose 50 mg/m2, Cycle 1, Reason: Anticipated Tolerance)  Administration: 67.2 mg (2024), 67.2 mg (2024), 67.2 mg (2024), 67.2 mg (2024), 67.2 mg (2024), 67.2 mg (2024)     2024 - 2024 Radiation    Treatment:  Course:  C1    Plan ID Energy Fractions Dose per Fraction (cGy) Dose Correction (cGy) Total Dose Delivered (cGy) Elapsed Days   R Lung_Hilum 6X 30 / 30 200 0 6,000 43      Treatment dates:  C1: 5/23/2024 - 7/5/2024            Discussion      cT2b N3 M0 Stage IIIB adenocarcinoma lung     Mikey  Kras C12V, p53  PDL1 TPS 5%  TMB 16 mut/Mb   Other  mutations negative      The optimal therapy of resectable Stage III NSCLC consists of systemic treatment combined with local approach with radiation and/or surgery.  Strategies include surgery followed by adjuvant chemotherapy  neoadjuvant chemotherapy or chemoimmunotherapy followed by surgery, neoadjuvant chemoradiotherapy followed by surgery or concurrent or sequential chemotherapy with definitive radiation therapy.      The accepted standard remains concurrent chemoradiotherapy although this is being challenged     The potential benefit of adding surgery to combined chemoradiotherapy for stage IIIA disease was noted in the Intergroup 0139 trial.  T1-3N2 NSCLC were assigned to concurrent chemorads 45 Gy with two cycles of Cisplatin/Etoposide followed by either surgery of continued chemorads.  PFS was longer in the surgery arm with no OS difference.      The two chemotherapy regimens that have been most commonly used in the [place] are the combination of cisplatin and etoposide and weekly carboplatin and paclitaxel:     Concurrent cisplatin (50 mg/m2 on days 1, 8, 29, and 36) plus etoposide (50 mg/m2 daily on days 1 to 5, and 29 to 33) with thoracic RT followed by two cycles of cisplatin plus etoposide was evaluated in a multicenter phase II trial of 50 patients with pathologically confirmed stage IIIB disease  With an average follow-up of 52 months, the three- and five-year survival rates were 17 and 15 percent, respectively. Treatment was complicated by grade 4 neutropenia in 32 percent and grade 3 or 4 esophagitis in 20 percent of patients. In a subsequent phase II  "trial, better results were seen with the same concurrent chemoradiation regimen followed by docetaxel consolidation, but the use of consolidation chemotherapy in this setting has been called into question by subsequent data       In a randomized phase II trial, carboplatin (area under the curve [AUC] = 2) and paclitaxel (45 mg/m2) given weekly with radiotherapy (63 Gy) followed by two cycles of consolidation therapy (carboplatin AUC = 6, paclitaxel 200 mg/m2) resulted in a median survival of 16.3 months.      Thus, for chemoradiation, the choices are cisplatin plus etoposide, in conjunction with once daily RT to a total of 60 Gy. An alternative \"radiosensitizing chemotherapy\" approach uses weekly carboplatin plus paclitaxel with approximately 60 Gy of radiation, followed by two cycles of consolidation with this same chemotherapy combination at standard systemic doses. The combination of pemetrexed and platinum agents has emerged as another acceptable alternative for stage III patients with nonsquamous cell histology.      Based on preclinical evidence demonstrating synergy between RT and IO, the Phase III [place] trial used a PDL1 inhibitor Durvalumab after concurrent CRT in a randomized study involving 713 patients.  After median follow up of 34 months giving drug every 2 weeks for 12 months as consolidation, the use of durvalumab significantly improved PFR from 5.6 to 16.9 months and led to a significant improvement in OS with HR 0.72.  The incidence of new mets was also lower including brain mets.      Grade 3 or 4 adverse events occurred in 31 percent of the patients who received durvalumab and 26 percent of those who received placebo, with the most common severe adverse event being pneumonia. Adverse events of any grade that were of special interest, regardless of cause, were reported in 66 percent of patients receiving durvalumab versus 49 percent of those receiving placebo, most of which were grade 1 or 2. " Such events for durvalumab versus placebo included diarrhea (18 versus 19 percent), pneumonitis (13 versus 8 percent), rash (12 versus 7 percent), and pruritus (12 versus 5 percent). Adverse events of special interest for which patients received concomitant treatment were reported in 42 versus 17 of patients receiving durvalumab and placebo, respectively, with treatments typically including steroids, endocrine agents, and occasionally other immunosuppressive agents. Patient-reported quality of life was comparable between the two groups      These results demonstrate efficacy and tolerability of durvalumab for treatment of unresectable stage III NSCLC in patients who experience an objective response or stable disease following completion of chemoradiotherapy.     For this patient weekly Carboplatin AUC 2 and Taxol 45 mg/m2 weekly x 6 weeks with RT to 60-63 Gy will be used.  IO therapy, based on response, will be considered per the [place] trial as above    Metastatic Disease      Metastatic NSCLC/adenocarcinoma  An improved understanding of the molecular pathways that drive malignancy in NSCLC has led to the development of agents that target specific molecular pathways in malignant cells. These agents have been a significant step forward in the treatment of patients whose tumors contain specific mutations in these pathways.  For this patient, Caris testing is pending    For those with tumor PD-L1 expression of 50 percent or higher, pembrolizumab or atezolizumab monotherapy is given; this  has demonstrated improvement in overall survival (OS) compared with doublet chemotherapy alone in this population. However, the combination of chemotherapy and pembrolizumab is preferred for those with rapidly progressive disease and is an acceptable alternative for others.    For patients with PD-L1 expression of less than 50 percent, we favor a combination of doublet chemotherapy with concurrent pembrolizumab. Although  pembrolizumab monotherapy is a US Food and Drug Administration (FDA)-approved option for patients with PD-L1 expression of 1 to 49 percent, a chemotherapy/pembrolizumab combination is preferred when feasible. In the randomized trial leading to this approval, results demonstrating superiority of pembrolizumab monotherapy over chemotherapy among patients with tumor PD-L1 expression of 1 percent or greater were driven by the subset of patients with high tumor PD-L1 expression (50 percent or higher).    Cisplatin-based regimens are slightly more effective than carboplatin-based combinations or nonplatinum regimens, but are associated with increased nonhematologic toxicity. Given that treatment is palliative in this setting, we opt for a carboplatin-based regimen for the majority of patients who will receive a chemotherapy doublet. Pemetrexed-based regimens should only be used in patients with nonsquamous histology. When combination chemotherapy is the initial treatment, it generally is limited to four to six cycles.     For patients with nonsquamous NSCLC and PD-L1 expression <50 percent,  the standard is the combination of pemetrexed, carboplatin or cisplatin, and pembrolizumab. This regimen has demonstrated improvements in multiple efficacy endpoints, including OS, relative to pemetrexed and carboplatin alone    Alternative options for patients with nonsquamous NSCLC and PD-L1 expression <50 percent are as follows:  The platinum-based doublet may instead be combined with bevacizumab, which increases progression-free survival (PFS) and OS relative to chemotherapy alone. A choice among these options takes into account patient and provider preferences, given that head-to-head comparisons of platinum combinations with either bevacizumab or pembrolizumab are not available. However, the magnitude of PFS improvement is more impressive with pembrolizumab than in the bevacizumab trials.    -The combination of platinum-based  doublet chemotherapy, bevacizumab, and atezolizumab is another potential alternative, and this regimen now has regulatory approval for patients without an epidermal growth factor receptor (EGFR) mutation or anaplastic lymphoma kinase (ALK) translocation.    For patients with squamous NSCLC and PD-L1 expression <50 percent,  carboplatin with either paclitaxel or nabpaclitaxel, and pembrolizumab, based on the demonstrated survival benefit of this regimen over chemotherapy alone for patients with advanced, squamous NSCLC.     Nivolumab plus ipilimumab is another FDA-approved option for patients with PD-L1 expression ?1 percent; nivolumab and ipilimumab is also approved with two cycles of platinum-based chemotherapy in tumors, irrespective of PD-L1 expression.        Patient now with brain mets after completion of CRT ; SRS completed 8th August 2024         Assessment/Plan:     Ms. 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  She has been tolerating Hydrea 500 mg once daily Monday through Friday and off on Saturday and Sunday. Patient also with 4.1 cm RUL mass 1.5cm spiculated posterior nodule mediastinal adenopathy, no metastatic disease including brain MRI new diagnosis      ET:      Previously treated with hydroxyurea (HYDREA) 500 mg capsule     Plts 498 > 434 > 388 (holding hydroxyurea since 5/23/2024 2/2 drop in platelets from chemotherapy)     Plts 166 (7/4/2024) s/p C6 chemo, continuing to hold hydroxyurea, recheck labs and will plan to restart once labs show thrombocytosis.    Does not need to be restarted on hydrea; labs from 8/1/2024 were 192      NSCLC/adenocarcinoma; mutational analysis pending      Now with RUL 4.2 cm mass with mediastinal adenopathy at least cT2a cN2 cM0 lung cancer     cT2b N3 Stage IIIB lung      Please see above regarding discussion      Carboplatin AUC 2 Taxol 45 mg/m2 weekly with RT     Durvalumab adjuvant post  "treatment depending on response     Neuropathy has resolved after last cycle of Taxol.     Esophagitis appearing better after completing radiation.     7/1/2024     Was admitted 6/22-6/24/2024 for neutropenic fever     WBC 2.5 ANC 1810 Hgb 9.0 plts 240 (hx ET/off hydrea)     Continue with C6 7/2/2024; last radiation 7/5/2024       7/15/2024     Completed C6 on 7/2/2024 and last radiation 7/5/2024.  Was admitted for neutropenic fever after last cycle of chemo.  Discharged on 7/4/2024 and has been asymptomatic since.  Labs at discharge showed WBC 1.5, ANC 1317 and Plt 166.     Repeat CT PET 8/26/2024, radiology read from CT CAP in ED showed \"progression\" of disease but this study was performed during chemoradiation, so it is confounded by the inability to distinguish disease from inflammation.  Will  treatment response on repeat CT PET.     Recheck labs to confirm neutropenia has resolved.     Hold Otezla for psoriasis until neutropenia has resolved, but would like to restart as soon as safe as psoriasis is rebounding.    8/16/2024    Was admitted for new onset brain mets 7/30/2024; on decadron taper down to 1 mg daily next week    S/p SRS 6 fractions completed 8 August 2024 at [place]    Had modest response to treatment per PET in terms of the lung    At issue clearly are the new brain lesions 2.0x.4 left occiput as well as multiple other smaller lesions as below    In addition PET with new SUV 9.9 mass in the right anorectal fat/right ischial tuberosity 2.7x2 which is painful to patient and feels is growing    Will get MRI pelvis; will probably need biopsy.  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  This is not significantly decreased enough to cause the recurrent infections.  Will continue to monitor on subsequent labs.  No " need for intervention at this time.       - IgG, IgA, IgM;levels intermittently drawn       Follow up     Follow up 2 weeks    Treatment at [facility] in 1 week           History of present illness:  Initial Visit 4/10/2024     Ayla Nye is a 73-year-old female with past medical history of hypertension, psoriatic arthritis, CKD stage III, hyperlipidemia, and latent TB.  She is seen in follow-up for essential thrombocytosis.  She has had an elevated platelet count dating back to January 2028 all of her other cell lines were within normal limits.  Her highest platelet count was around 700,000.  Myeloproliferative work-up demonstrated positive JAK2 mutation and she was started on 500 mg of Hydrea daily in July 2020.  Due to leukopenia her dose was reduced to Monday, Wednesday, Friday.  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  Then again in July we had to decrease her dose and she is taking 500 mg once daily Monday through Friday and not taking any on Saturday or Sunday.     She has been tolerating the 500 mg of Hydrea Monday through Friday off Saturday and Sunday well without any side effects.  She was seen by my attending and underwent further workup of her recurrent infections and was found to have a mildly low IgG level.   She has had pneumonia twice once in October and then again in February.  She states that she has had multiple imaging of her chest which does suggest scarring which prompted a CT scan of her chest .  .  She is also had recurrent sinus infections.  She is a former smoker and quit 15 years ago bit was 1[[d x more than 30 years.        She does not have any continued symptoms of pneumonia and no cough, shortness of breath, or fevers.       She underwent the following testing; EBUS with pulmonary as well as CT  chest/PET scan     Lung mass on CT chest 2/28/2024     LUNGS:     -4.1 x 3.4 cm solid mass in the anterior right upper lobe (series 302, image 73).     -Posterior to this mass, there is a 1.5 x 1.5 cm spiculated nodule (series 302, image 78)     -2.5 x 2.1 cm groundglass nodule in the anterior left upper lobe (series 302, image 90)     Mild emphysema.     Right apical pleural-parenchymal scarring.     PLEURA: Small calcified left posterior pleural plaque, which may be from prior asbestos exposure or the sequela of old inflammation.     HEART/GREAT VESSELS: Normal heart size. Mild-moderate coronary artery calcification. Mild to moderate mitral annular calcification. Trace pericardial effusion. No thoracic aortic aneurysm.     MEDIASTINUM AND SUDEEP: 1.6 x 2.0 cm right lower paratracheal/precarinal lymph node. 1.3 x 1.3 cm right lower paratracheal lymph node.        CT PET  3/28/2024     Intensely FDG avid right upper lobe mass, SUV max of 18. This abuts the anterior pleural margin. Central photopenia suggest necrosis. Mass measures on the order of 4.2 cm in size, stable previously 4.1 cm.     Subjacent 1.5 cm spiculated nodule posteriorly demonstrates minimal uptake, SUV max of 1.7.     Minimal FDG uptake in the left upper lobe groundglass nodule, SUV max of 2.2. This measured up to 2.5 cm in size on recent CT, appears grossly stable on low-dose CT.     A few FDG-avid mediastinal lymph nodes. Right anterior paratracheal lymph node demonstrates SUV max of 13. This measures up to 1.9 cm short axis image 85 series 3, may be slightly larger previously 1.6 cm.     A right perihilar lymph node demonstrates SUV max of 12. This measures on the order of 1.2 cm short axis image 89 series 3.  CT images: Scattered emphysematous changes of the lung fields. Mild to moderate coronary artery calcifications. Minimal left pleural calcification. Small calcified lymph nodes in the left perihilar region        3/26/2024     -C. Lymph Node,  Level 4R (ThinPrep, smears and cell block preparations):    - Metastatic non-small cell carcinoma, most compatible with a primary lung adenocarcinoma; see note.    - Satisfactory for evaluation.     D-F. Lymph Node, Level 10R (ThinPrep, smears and cell block preparations):    - Metastatic non-small cell carcinoma; see note.    - Satisfactory for evaluation.     G-I. Lymph Node, Level 4L (ThinPrep, smears and cell block preparations):    - Metastatic non-small cell carcinoma; see note.    - Satisfactory for evaluation.      7/3/2024    CT CHEST, ABDOMEN AND PELVIS WITH IV CONTRAST     INDICATION: hx of lung cancer, fever, cough. Fever (102.9 degrees Fahrenheit), cough, recently had chemotherapy.     COMPARISON: February 28, 2024     TECHNIQUE: CT examination of the chest, abdomen and pelvis was performed. Multiplanar 2D reformatted images were created from the source data.     This examination, like all CT scans performed in the [facility], was performed utilizing techniques to minimize radiation dose exposure, including the use of iterative reconstruction and automated exposure control. Radiation dose length   product (DLP) for this visit: 314 mGy-cm     IV Contrast: 100 mL of iohexol (OMNIPAQUE)  Enteric Contrast: Not administered.     FINDINGS:     CHEST     LUNGS: There is an approximately 4.6 x 4.8 x 3.3 cm mass within the anteromedial aspect of the right upper lobe of the lung. This mass abuts the anterior and anteromedial pleural surface and the right hand aspect of the upper mediastinum. The mass   demonstrates peripheral spiculation and areas of low density centrally, suggesting areas of necrosis. Just posterolateral to this mass there is a spiculated satellite lesion measuring approximately 1.6 cm. There is also a spiculated satellite lesion   anterior and inferior to the dominant mass, measuring approximately 1.4 cm and abutting the anterior pleural surface (series 302 image 91); this  satellite mass appears new compared with February 28, 2024. An ill-defined groundglass opacity in the left   upper lobe of the lung measures approximately 2.5 cm, similar to the prior study.     There is mild to moderate emphysema. There is mild bibasilar scarring versus atelectasis. Right apical scarring unchanged.     PLEURA: Left pleural calcifications unchanged. No pleural effusions. No pneumothorax.     HEART/GREAT VESSELS: Coronary artery disease. There is atherosclerosis of the thoracic aorta. No thoracic aortic aneurysm.     MEDIASTINUM AND SUDEEP: There is a 2.4 x 2.2 cm right paratracheal node demonstrating low density centrally suggesting necrosis; this is larger compared with February 28, 2024. Additionally, there is a 2.8 x 2 cm precarinal node demonstrating low density   centrally suggesting necrosis, also larger compared to the prior study. Necrotic appearing right hilar adenopathy measuring up to 2 cm.     CHEST WALL AND LOWER NECK: Tiny thyroid nodules, measuring 6 mm or less. Incidental discovery of one or more thyroid nodule(s) measuring less than 1.5 cm and without suspicious features is noted in this patient who is above 35 years old; according to   guidelines published in the February 2015 white paper on incidental thyroid nodules in the Journal of the American College of Radiology (JACR), no further evaluation is recommended.     AInterval History:  6/3/2024     Ms Nye is tolerating treatment fairly well but did have a reaction to Taxol at C2.  She has flushing and chest pain; drug was stopped she was given Benadryl/steroids/fluids and started at slower rate.  She has not had issues with N/V but with constipation.  Her joints are more painful.  She is eating but taste is an issue, probably due to Carboplatin.  She denies any GERD but has increased cough which is from CRT.  She has no SOB.  We discussed Mucinex to help cut the phlegm generated from treatment.  Her weight is 110 pounds which  "is slightly decreased; her BP is 118/64. She has no other issues          Interval History:  6/17/2024     Doing well; in week 5.  Has esophagitis and carafate slurry  was prescribed; she has not started it.  No N/V, has minimal numbness mostly feet.  Had issue with veins and extravasation fluid left lower arm.  Some reddness, healing, no infection.  Labs ok plts 388 WBC 5.9 Hgb 9.4.  States psoriasis is better.  Her weight is decreased to 104 but she is eating but less.       Interval History:  7/1/2024 6/22/2024 admitted due to neutropenic  fever, rigors.  She also endorsed fatigue which was post chemo.  In the ED, she was found to be fulfilling the SIRS criteria given her low white count, she was empirically started on broad-spectrum antibiotic.  On the following day antibiotics were stopped as she continued to improve.  During her hospitalization, she also developed diarrhea which was to be noninfective in origin.  She also received her scheduled radiation therapy  Discharged 6/24/2024; chemotherapy was held for neutropenia     She is doing better today; very anxious to stop treatment this week.  Feeling improved from admission.      Labs improved as above WBC 2.5 ANC 1810 plts 240 Na 141 k 4/1 Cr 0.85 ALT/AST 8/10      Interval History:  7/15/2024   Completed C6 on 7/2/2024 at [place] and completed radiation on 7/5/2024.  Was admitted at [facility] for neutropenic fever after last cycle of chemo.  Discharged on 7/4/2024 and has been asymptomatic since.  CT CAP showed radiology read of \"progression\" of disease, but this scan was taken during chemoradiation and cannot distinguish between disease and inflammation.  Labs at discharge showed WBC 1.5, ANC 1317 and Plt 166.  Denied fever/chills, night sweats, adenopathy, or weight loss.  Endorsed worsening of psoriasis.  Neuropathy has resolved after last cycle of chemo.  Continues off hydroxyurea 2/2 normal platelet counts and continues off Otezla for " "psoriasis.         Interval History:  8/16/2024    Patient completed CRT in early July 2024, was admitted for NF and on that scan there appeared to be increase in the RUL mass but again had just finished concurrent treatment with significant inflammation    On 30th July , patient early in the day was well, mowing grass and then noted loss of balance, disorientation, confusion.  She also had vision \"flashes\" in the right field.  She was seen at [place] as below:    Admission 7/30/2024    status post concurrent weekly Taxol/carboplatin with radiation therapy on May 2024 and finished on 7/5/2024, the patient had change in mental status, imbalance, nausea admitted to the hospital on 7/29/2024, MRI of the brain showed multiple supratentorial and infratentorial enhancing lesion associated with vasogenic edema the largest lesion in the left occipital lobe measuring 2 x 1.4 cm with adjacent edema    Treatment options from a radiation therapy standpoint include SRS/SRT or whole brain radiation ± hippocampal avoidance. Given the small size and relatively small number of metastases, SRS would be the preferred treatment approach. I discussed with Dr. Norwood with consensus for SRS.     Possible short- and long-term side effects inclide but are not limited to, fatigue, headache, nausea, alopecia, vomiting, injury to the cranial nerves and/or visual structures including the optic nerve and chiasm, a low risk of neurocognitive effects, neurologic deficits, radionecrosis which may require treatment with steroids or surgical resection, hearing loss, stroke-like symptoms, and a low risk of radiation-induced secondary malignancy. Even with appropriate treatment, there is a risk of progression.  One some occasions, additional/other tumors may develop requiring re-evaluation.        MRI 7/30/2024    1. Multiple supratentorial and infratentorial enhancing lesions with associated vasogenic edema, the largest of which is in the left " occipital lobe. Findings are most consistent with metastases.     2. No acute infarct or midline shift.    She had the PET scan done after discharge as below:      PET scan 8/5/2024    Persistent hypermetabolic medial right upper lobe lung mass measuring 3.3 x 2.7 cm on image 132 of series 3 with max SUV of 10.0, previously measuring 3.7 x 2.9 cm with max SUV of 18.0 when utilizing similar measurement technique.     On image 132 of series 3 there is a stable adjacent 7 mm right upper lobe lung nodule with max SUV of 2.1, previously similar in size with max SUV of 1.7     Improving hypermetabolic right paratracheal mediastinal and right hilar yaw metastatic disease. For example, on image 137, hypermetabolic right paratracheal lymph node measures 7 mm in short axis with max SUV of 3.7, previously approximately 1.3 cm   with max SUV of 13.0.     Stable mildly FDG avid semisolid groundglass nodular opacity involving the left upper lobe measuring 2.6 x 1.1 cm on image 142 of series 3 with max SUV of 1.9, previously similar in size with max SUV of 2.2.  CT images: Atherosclerotic vascular calcifications including those of the coronary arteries are noted. Emphysematous changes.     1. New hypermetabolic soft tissue mass involving the right anorectal fat medial to the right ischial tuberosity. While this finding could reflect hypermetabolic metastatic disease related to patient's primary bronchogenic carcinoma the distribution is   atypical for metastatic disease from bronchogenic carcinoma. Therefore, consider further evaluation with tissue sampling to exclude secondary hypermetabolic malignancy.     2. Persistent hypermetabolic right upper lobe bronchogenic carcinoma with hypermetabolic mediastinal and right hilar yaw metastatic disease which overall is improved since prior PET/CT.     3. Hypermetabolic left occipital lobe metastatic disease. Patient's known intracranial metastatic disease was better characterized on  prior MRI of the brain.    Today, she completed SRS at [place] and is on a steroid taper; 2 mg daily decadron and will go to 1 mg daily decadron next week.  Completed RT 8 August 2024.  Doing well, no confusion, ambulating without issue.  Is on Keppra which she will remain on; 500 mg bid given.  Decadron will continue for now and probably for another additional week due to potential recall inflammation with Pembrolizumab.  She will get first line treatment in the metastatic setting with Carboplatin/Pemetrexed/Pembrolizumab.  Would like to start in one week.  Consent obtained.  Concern with IO causing more inflammation in the brain  as crosses BBB and thus may continue on 1 mg decadron which is equivalent to 5 mg prednisone and thus will not impede efficacy of IO therapy    Does have new right ischial tuberosity lesion on PET which she is having pain.  Will get MRI and may need RT for pain palliation but unclear if this is metastatic disease vs new primary as unusual for lung to spread in this fashion.    She has no fevers, bowel issues/changes to suggest abscess.      REVIEW OF SYSTEMS:  As above   Please note that a 14-point review of systems was performed to include Constitutional, HEENT, Respiratory, CVS, GI, , Musculoskeletal, Integumentary, Neurologic, Rheumatologic, Endocrinologic, Psychiatric, Lymphatic, and Hematologic/Oncologic systems were reviewed and are negative unless otherwise stated in HPI. Positive and negative findings pertinent to this evaluation are incorporated into the history of present illness.      ECOG PS: 1    PROBLEM LIST:  Patient Active Problem List   Diagnosis    TB lung, latent    Psoriatic arthritis (HCC)    Essential thrombocytosis (HCC)    Hypertension    Mixed hyperlipidemia    Encounter for monitoring of hydroxyurea therapy    JAK2 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    Age-related osteoporosis without current pathological fracture    Malignant neoplasm of upper lobe, right bronchus or lung (HCC)     Bilateral leg pain    Insomnia    Moderate protein-calorie malnutrition (HCC)    Dehydration    SIRS (systemic inflammatory response syndrome) (HCC)    Visual disturbance    Metastatic cancer to brain (HCC)    Brain mass    Malignant neoplasm metastatic to brain (HCC)       Past Medical History:   has a past medical history of Allergic (2022), Cataract (Nov. 2023), Essential thrombocytosis (HCC), Hyperlipidemia, Hypertension, Mass in chest, Nodular goiter, Pneumonia (Oct 16, 2023), Psoriasis, and SIRS (systemic inflammatory response syndrome) (HCC) (06/22/2024).    PAST SURGICAL HISTORY:   has a past surgical history that includes Appendectomy; Mammo needle localization right (all inc) (Right, 07/13/2009); Skin lesion excision; Colonoscopy (10/31/2018); Mammo (historical); DXA procedure(historical) (04/21/2017); and Breast cyst excision (Right, 2009).    CURRENT MEDICATIONS  Current Outpatient Medications   Medication Sig Dispense Refill    aspirin 81 mg chewable tablet Chew 81 mg daily.      atorvastatin (LIPITOR) 40 mg tablet Take 1 tablet (40 mg total) by mouth every evening 30 tablet 0    Cholecalciferol (VITAMIN D3) 5000 units TABS Take 5,000 Units by mouth Daily      dexamethasone (DECADRON) 1 mg tablet Take 4 tablets (4 mg total) by mouth daily with breakfast for 5 days, THEN 2 tablets (2 mg total) daily with breakfast for 5 days, THEN 1 tablet (1 mg total) daily with breakfast for 5 days. 35 tablet 0    dexamethasone (DECADRON) 4 mg tablet Take 1 tablet (4 mg total) by mouth daily 30 tablet 0    diphenhydrAMINE (BENADRYL) 25 mg capsule Take 25 mg by mouth every 12 (twelve) hours as needed for itching      levETIRAcetam (Keppra) 750 mg tablet Take 1 tablet (750 mg total) by mouth 2 (two) times a day 60 tablet 0    pantoprazole (PROTONIX) 40 mg tablet Take 1 tablet (40 mg total) by mouth daily in the early morning 30 tablet 0    Probiotic Product (PROBIOTIC DAILY PO) Take 1 capsule by mouth daily       sulfamethoxazole-trimethoprim (BACTRIM DS) 800-160 mg per tablet Take 1 tablet by mouth every 12 (twelve) hours 60 tablet 0    vitamin B-12 (VITAMIN B-12) 1,000 mcg tablet Take 1 tablet (1,000 mcg total) by mouth daily 90 tablet 1     No current facility-administered medications for this visit.     [unfilled]    SOCIAL HISTORY:   reports that she has quit smoking. Her smoking use included cigarettes. She started smoking about 58 years ago. She has a 58.6 pack-year smoking history. She has been exposed to tobacco smoke. She has never used smokeless tobacco. She reports that she does not currently use alcohol. She reports that she does not use drugs.     FAMILY HISTORY:  family history includes Cancer in her brother and brother; Coronary artery disease in her brother; Depression in her mother; Hearing loss in her mother; Hypertension in her brother and mother; No Known Problems in her daughter, daughter, maternal aunt, maternal aunt, maternal aunt, maternal aunt, maternal grandfather, maternal grandmother, paternal aunt, paternal grandfather, and paternal grandmother; Stroke in her mother; Tuberculosis in her brother and father.     ALLERGIES:  is allergic to doxycycline, keflex [cephalexin], and neomycin-polymyxin-dexameth.      Physical Exam:  Vital Signs:   Visit Vitals  OB Status Postmenopausal   Smoking Status Former     There is no height or weight on file to calculate BMI.  There is no height or weight on file to calculate BSA.    GEN: Alert, awake oriented x3, in no acute distress  HEENT- No pallor, icterus, cyanosis, no oral mucosal lesions,   LAD - no palpable cervical, clavicle, axillary, inguinal LAD  Heart- normal S1 S2, regular rate and rhythm, No murmur, rubs.   Lungs- clear breathing sound bilateral.   Abdomen- soft, Non tender, bowel sounds present  Extremities- No cyanosis, clubbing, edema  Neuro- No focal neurological deficit    Labs:  Lab Results   Component Value Date    WBC 3.97 (L) 08/01/2024     HGB 9.8 (L) 08/01/2024    HCT 30.8 (L) 08/01/2024     (H) 08/01/2024     08/01/2024     Lab Results   Component Value Date    SODIUM 140 07/31/2024    K 3.8 07/31/2024     07/31/2024    CO2 27 07/31/2024    AGAP 5 07/31/2024    BUN 13 07/31/2024    CREATININE 0.84 07/31/2024    GLUC 85 07/31/2024    GLUF 82 07/29/2024    CALCIUM 9.0 07/31/2024    AST 14 07/29/2024    ALT 7 07/29/2024    ALKPHOS 60 07/29/2024    TP 6.3 (L) 07/29/2024    TBILI 0.33 07/29/2024    EGFR 69 07/31/2024     PET 8/5/2024      VISUALIZED BRAIN:  Focal FDG activity involving the left occipital lobe on PET image 53 with max SUV of 11.0 corresponding to patient's known metastatic lesion which was better characterized on MRI dated 7/30/2024; this finding is associated with adjacent regional   photopenia which could be related to underlying vasogenic edema seen on MRI.     Patient's known additional metastatic lesions seen on recent MRI are not definitively visualized on current PET/CT and could be obscured by normal background physiologic brain activity.     HEAD/NECK:  There is a physiologic distribution of FDG. No FDG avid cervical adenopathy is seen.  CT images: Intracranial atherosclerotic calcification is noted.     CHEST:  Persistent hypermetabolic medial right upper lobe lung mass measuring 3.3 x 2.7 cm on image 132 of series 3 with max SUV of 10.0, previously measuring 3.7 x 2.9 cm with max SUV of 18.0 when utilizing similar measurement technique.     On image 132 of series 3 there is a stable adjacent 7 mm right upper lobe lung nodule with max SUV of 2.1, previously similar in size with max SUV of 1.7     Improving hypermetabolic right paratracheal mediastinal and right hilar ywa metastatic disease. For example, on image 137, hypermetabolic right paratracheal lymph node measures 7 mm in short axis with max SUV of 3.7, previously approximately 1.3 cm   with max SUV of 13.0.     Stable mildly FDG avid semisolid  groundglass nodular opacity involving the left upper lobe measuring 2.6 x 1.1 cm on image 142 of series 3 with max SUV of 1.9, previously similar in size with max SUV of 2.2.  CT images: Atherosclerotic vascular calcifications including those of the coronary arteries are noted. Emphysematous changes.     ABDOMEN:  No FDG avid soft tissue lesions are seen.  CT images: Atherosclerotic vascular calcifications are noted. Tiny nonobstructing right renal calculus.     PELVIS:  On image 272 series 3 there is a new hypermetabolic soft tissue mass involving the fat in the right anorectal region medial to the right ischial tuberosity measuring 2.7 x 2.0 cm with max SUV of 9.9; this finding seems to be in an atypical distribution   from patient's primary bronchogenic carcinoma and could reflect a separate malignant process. Further evaluation with tissue sampling is recommended for further characterization as clinically indicated. This finding has significantly increased in size   since 7/3/2024 where it measured 1.0 cm.     CT images: Unremarkable.     OSSEOUS STRUCTURES:  No FDG avid lesions are seen.  CT images: Possible osteopenia. Degenerative changes are noted involving the spine.     IMPRESSION:     1. New hypermetabolic soft tissue mass involving the right anorectal fat medial to the right ischial tuberosity. While this finding could reflect hypermetabolic metastatic disease related to patient's primary bronchogenic carcinoma the distribution is   atypical for metastatic disease from bronchogenic carcinoma. Therefore, consider further evaluation with tissue sampling to exclude secondary hypermetabolic malignancy.     2. Persistent hypermetabolic right upper lobe bronchogenic carcinoma with hypermetabolic mediastinal and right hilar yaw metastatic disease which overall is improved since prior PET/CT.     3. Hypermetabolic left occipital lobe metastatic disease. Patient's known intracranial metastatic disease was  better characterized on prior MRI of the brain.     4. Stable mildly FDG avid left upper lobe groundglass nodule suspicious for malignancy of low metabolic activity such as bronchoalveolar carcinoma.       MRI brain 7/30/2024    ultiple enhancing lesions are seen supratentorially and infratentorially, with associated vasogenic edema. Lesions as referenced below:     1. 2.0 x 1.4 cm lesion in the left occipital lobe on series 10, image 16 with adjacent vasogenic edema involving the left occipital lobe and extending into the left periatrial white matter, where there is mild mass effect on the adjacent lateral   ventricle. There is an internal, diffusion restricting 8 mm component.     2. 4 mm enhancing lesion in the left paramedian frontal lobe on series 10, image 20 with mild adjacent mass effect and vasogenic edema and a small focus of susceptibility likely reflective of hemorrhage.     3. 3 mm lesion in the left paramedian parietal lobe on series 10, image 20 with mild mass effect and vasogenic edema.     4. 3 mm lesion in the midline cerebellum on series 10, image 9 with adjacent vasogenic edema which results in mild mass effect on the fourth ventricle.     5. 2 mm lesion in the left frontal lobe (series 11, image 129)     VENTRICLES:  Normal for the patient's age.     SELLA AND PITUITARY GLAND:  Normal.     ORBITS: No acute abnormality.     PARANASAL SINUSES: Essentially clear.     VASCULATURE:  Evaluation of the major intracranial vasculature demonstrates appropriate flow voids.     CALVARIUM AND SKULL BASE: No acute abnormality.     EXTRACRANIAL SOFT TISSUES: No acute abnormality.     IMPRESSION:     1. Multiple supratentorial and infratentorial enhancing lesions with associated vasogenic edema, the largest of which is in the left occipital lobe. Findings are most consistent with metastases.     2. No acute infarct or midline shift.         I spent 40 minutes on chart review, face to face counseling time,  coordination of care and documentation.    Rosalinda Castro MD PhD

## 2024-08-15 NOTE — PROGRESS NOTES
Virtual Brief Visit - Radiation Oncology   Ayla Nye 1950 73 y.o. female [id number]      REQUIRED DOCUMENTATION:     1. This service was provided via Telemedicine.  2. Provider located at   [facility] RADIATION ONCOL[facility]  [address] [place] [address]  3. TeleMed provider: Honey Gamboa MD.  4. Identify all parties in room with patient during tele consult:  None  5.Patient was then informed that this was a Telemedicine visit and that the exam was being conducted confidentially over secure lines. My office door was closed. No one else was in the room.  Patient acknowledged consent and understanding of privacy and security of the Telemedicine visit, and, if applicable, gave us permission to have an assistant stay in the room in order to assist with the history and to conduct the exam.  I informed the patient that I have reviewed their record in Epic and presented the opportunity for them to ask any questions regarding the visit today.  The patient agreed to participate.       Cancer Staging   Malignant neoplasm of upper lobe, right bronchus or lung (HCC)  Staging form: Lung, AJCC 8th Edition  - Clinical stage from 4/15/2024: Stage IIIB (cT2b, cN3, cM0) - Signed by Rogelio Beebe DO on 4/15/2024    Assessment/Plan:  Ayla Nye is a 73 y.o. female known to radiation oncology for [id number] (IIIB) NSCLC of the right upper lobe, s/p concurrent chemoRT completing on 7/5/24. She is now s/p SRS on 8/8/24 after MRI brain demonstrated five supra- and infratentorial enhancing lesions compatible with metastases. She tolerated this treatment well.    She presents for routine 1-1.5 month end of treatment follow-up visit after completion of thoracic chemoRT. She has recovered from usual expected side effects of therapy. She did have an interval PET/CT which shows a finding in the anorectal fat region for which tissue sampling was advised. I am not able to evaluate this area over phone  encounter but did review that the differential includes infectious/inflammatory etiology vs atypical metastatic deposit. She is scheduled to see medical oncology tomorrow.  MRI Brain scheduled on 10/8/24  F/U appt scheduled on 10/17/24 to review results.  09/03/24 - Hem Matthew Rossi  09/17/24 - Neurology, Nicho Gamboa MD  Department of Radiation Oncology      No orders of the defined types were placed in this encounter.       Total Time Spent  20 minutes spent reviewing EMR in preparation for visit, with the patient, coordination with other providers, and documentation.    History of Present Illness   Interval History:  Patient has history of hypertension, psoriatic arthritis, CKD stage III, hyperlipidemia, latent TB, essential thrombocytosis on hydroxyurea, former smoker, she quit in 2010.   She had pneumonia in October and then again in February.  She was found to have a RUL mass with mediastinal adenopathy on CT. She underwent EBUS that showed metastatic NSCLC compatible with primary lung adenocarcinoma in lymph nodes 4R, 4L and 10R. She is s/p PET/CT and MRI brain did not show any evidence of brain metastasis.  The pt completed a course of radiation to the R lung on 07/05/24. She has her EOT telemedicine visit today.    07/15/24 - Matthew Jung  Pt doing well  Decreased neuropathy in feet  Doing well post RT    07/29/24 - 08/01/24 - Admitted to hospital  Dizziness - felt dizzy after cutting grass, flashing in right eye, felt disoriented/confused  Pt admitted under stoke alert pathway  MRI completed - multiple supratentorial and infratentorial lesions    07/29/24 - CT stoke alert brain  IMPRESSION:  1. Small area of hypoattenuation in the left superior frontal lobe and larger area in the left posterior parietal region, suggestive of vasogenic edema. Findings are concerning for occult underlying metastases. Acute infarcts cannot be excluded however   infarcts in  2 vascular territories and with findings suggesting subacute infarcts, not agreement with the clinical timeline suggest stroke is less likely.  2. No intracranial hemorrhage.  Findings were directly discussed with Percy Montalvo  at    8:20 PM .    24 - CTA stoke alert (head/neck)  IMPRESSION:  No hemodynamically significant stenosis, dissection or occlusion of the carotid or vertebral arteries or major vessels of the [place]..    24 - MRI brain w wo contrast  IMPRESSION:  1. Multiple supratentorial and infratentorial enhancing lesions with associated vasogenic edema, the largest of which is in the left occipital lobe. Findings are most consistent with metastases.  2. No acute infarct or midline shift.  The study was marked in EPIC for immediate notification.    24 - NM PET CT skull base to mid thigh  IMPRESSION:  1. New hypermetabolic soft tissue mass involving the right anorectal fat medial to the right ischial tuberosity. While this finding could reflect hypermetabolic metastatic disease related to patient's primary bronchogenic carcinoma the distribution is   atypical for metastatic disease from bronchogenic carcinoma. Therefore, consider further evaluation with tissue sampling to exclude secondary hypermetabolic malignancy.  2. Persistent hypermetabolic right upper lobe bronchogenic carcinoma with hypermetabolic mediastinal and right hilar yaw metastatic disease which overall is improved since prior PET/CT.  3. Hypermetabolic left occipital lobe metastatic disease. Patient's known intracranial metastatic disease was better characterized on prior MRI of the brain.  4. Stable mildly FDG avid left upper lobe groundglass nodule suspicious for malignancy of low metabolic activity such as bronchoalveolar carcinoma.  Please see above for details and additional findings.  The study was marked in EPIC for significant notification.     Upcomin24 - Hem Onc [facility]  24 - Neurology,  Nicho  10/08/24 - MRI brain BT w wo contrast    Historical Information   Oncology History   Malignant neoplasm of upper lobe, right bronchus or lung (HCC)   2024 Initial Diagnosis    Primary lung adenocarcinoma, right (HCC)     3/26/2024 Biopsy    A-C. Lymph Node, Level 4R :    - Metastatic non-small cell carcinoma, most compatible with a primary lung adenocarcinoma; see note.       D-F. Lymph Node, Level 10R:    - Metastatic non-small cell carcinoma; see note.       G-I. Lymph Node, Level 4L:    - Metastatic non-small cell carcinoma; see note.       4/15/2024 -  Cancer Staged    Staging form: Lung, AJCC 8th Edition  - Clinical stage from 4/15/2024: Stage IIIB (cT2b, cN3, cM0) - Signed by Rogelio Beebe DO on 4/15/2024       5/23/2024 - 7/2/2024 Chemotherapy    alteplase (CATHFLO), 2 mg, Intracatheter, Every 1 Minute as needed, 6 of 6 cycles  CARBOplatin (PARAPLATIN) IVPB (GO AUC DOSING), 130.4 mg, Intravenous, Once, 6 of 6 cycles  Administration: 130.4 mg (5/23/2024), 144.8 mg (5/30/2024), 138.4 mg (6/6/2024), 144.8 mg (6/13/2024), 132 mg (6/21/2024), 143.6 mg (7/2/2024)  PACLItaxel (TAXOL) chemo IVPB, 45 mg/m2 = 67.2 mg (90 % of original dose 50 mg/m2), Intravenous, Once, 6 of 6 cycles  Dose modification: 45 mg/m2 (original dose 50 mg/m2, Cycle 1, Reason: Anticipated Tolerance)  Administration: 67.2 mg (5/23/2024), 67.2 mg (5/30/2024), 67.2 mg (6/6/2024), 67.2 mg (6/13/2024), 67.2 mg (6/21/2024), 67.2 mg (7/2/2024)     5/23/2024 - 7/5/2024 Radiation    Treatment:  Course: C1    Plan ID Energy Fractions Dose per Fraction (cGy) Dose Correction (cGy) Total Dose Delivered (cGy) Elapsed Days   R Lung_Hilum 6X 30 / 30 200 0 6,000 43      Treatment dates:  C1: 5/23/2024 - 7/5/2024       Past Medical History:   Diagnosis Date    Allergic 2022    Allergic to neomiacin and cephalexin    Cataract Nov. 2023    Due to have durgery June 2024    Essential thrombocytosis (HCC)     controlled with medication     Hyperlipidemia     Hypertension     Mass in chest     Nodular goiter     Pneumonia Oct 16, 2023    Also  Feb 2024    Psoriasis     SIRS (systemic inflammatory response syndrome) (HCC) 06/22/2024     Past Surgical History:   Procedure Laterality Date    APPENDECTOMY      BREAST CYST EXCISION Right 2009    COLONOSCOPY  10/31/2018    DXA PROCEDURE (HISTORICAL)  04/21/2017    MAMMO (HISTORICAL)      8-24-18    MAMMO NEEDLE LOCALIZATION RIGHT (ALL INC) Right 07/13/2009    SKIN LESION EXCISION      bridge of nose     Social History   Social History     Substance and Sexual Activity   Alcohol Use Not Currently     Social History     Substance and Sexual Activity   Drug Use Never     Social History     Tobacco Use   Smoking Status Former    Current packs/day: 1.00    Average packs/day: 1 pack/day for 58.6 years (58.6 ttl pk-yrs)    Types: Cigarettes    Start date: 1966    Passive exposure: Past   Smokeless Tobacco Never   Tobacco Comments    Quit 2010     Meds/Allergies     Current Outpatient Medications:     aspirin 81 mg chewable tablet, Chew 81 mg daily., Disp: , Rfl:     atorvastatin (LIPITOR) 40 mg tablet, Take 1 tablet (40 mg total) by mouth every evening, Disp: 30 tablet, Rfl: 0    Cholecalciferol (VITAMIN D3) 5000 units TABS, Take 5,000 Units by mouth Daily, Disp: , Rfl:     dexamethasone (DECADRON) 1 mg tablet, Take 4 tablets (4 mg total) by mouth daily with breakfast for 5 days, THEN 2 tablets (2 mg total) daily with breakfast for 5 days, THEN 1 tablet (1 mg total) daily with breakfast for 5 days., Disp: 35 tablet, Rfl: 0    dexamethasone (DECADRON) 4 mg tablet, Take 1 tablet (4 mg total) by mouth daily, Disp: 30 tablet, Rfl: 0    diphenhydrAMINE (BENADRYL) 25 mg capsule, Take 25 mg by mouth every 12 (twelve) hours as needed for itching, Disp: , Rfl:     levETIRAcetam (Keppra) 750 mg tablet, Take 1 tablet (750 mg total) by mouth 2 (two) times a day, Disp: 60 tablet, Rfl: 0    pantoprazole (PROTONIX) 40 mg tablet,  "Take 1 tablet (40 mg total) by mouth daily in the early morning, Disp: 30 tablet, Rfl: 0    Probiotic Product (PROBIOTIC DAILY PO), Take 1 capsule by mouth daily, Disp: , Rfl:     sulfamethoxazole-trimethoprim (BACTRIM DS) 800-160 mg per tablet, Take 1 tablet by mouth every 12 (twelve) hours, Disp: 60 tablet, Rfl: 0    vitamin B-12 (VITAMIN B-12) 1,000 mcg tablet, Take 1 tablet (1,000 mcg total) by mouth daily, Disp: 90 tablet, Rfl: 1  Allergies   Allergen Reactions    Doxycycline Headache    Keflex [Cephalexin] Rash    Neomycin-Polymyxin-Dexameth Eye Swelling       OBJECTIVE:   Physical exam limited over telephone encounter.    Honey Gamboa MD  8/15/2024,2:50 PM    VIRTUAL VISIT DISCLAIMER  Patient verbally agrees to participate in Virtual Care Services. Pt is aware that Virtual Care Services could be limited without vital signs or the ability to perform a full hands-on physical exam. Patient understands that the patient or the provider may request at any time to terminate the video visit and request the patient to seek care or treatment in person.    Portions of the record may have been created with voice recognition software.  Occasional wrong word or \"sound a like\" substitutions may have occurred due to the inherent limitations of voice recognition software.  Read the chart carefully and recognize, using context, where substitutions have occurred.  "

## 2024-08-16 ENCOUNTER — OFFICE VISIT (OUTPATIENT)
Age: 74
End: 2024-08-16
Payer: MEDICARE

## 2024-08-16 ENCOUNTER — TELEPHONE (OUTPATIENT)
Age: 74
End: 2024-08-16

## 2024-08-16 VITALS
HEIGHT: 62 IN | DIASTOLIC BLOOD PRESSURE: 83 MMHG | TEMPERATURE: 98 F | OXYGEN SATURATION: 98 % | SYSTOLIC BLOOD PRESSURE: 117 MMHG | RESPIRATION RATE: 16 BRPM | BODY MASS INDEX: 19.8 KG/M2 | WEIGHT: 107.6 LBS | HEART RATE: 73 BPM

## 2024-08-16 DIAGNOSIS — C34.91 NSCLC OF RIGHT LUNG (HCC): Primary | ICD-10-CM

## 2024-08-16 DIAGNOSIS — C34.11 MALIGNANT NEOPLASM OF UPPER LOBE, RIGHT BRONCHUS OR LUNG (HCC): ICD-10-CM

## 2024-08-16 DIAGNOSIS — C34.91 NSCLC OF RIGHT LUNG (HCC): ICD-10-CM

## 2024-08-16 PROCEDURE — 99215 OFFICE O/P EST HI 40 MIN: CPT | Performed by: INTERNAL MEDICINE

## 2024-08-16 RX ORDER — SODIUM CHLORIDE 9 MG/ML
20 INJECTION, SOLUTION INTRAVENOUS ONCE
OUTPATIENT
Start: 2024-08-26

## 2024-08-16 RX ORDER — LEVETIRACETAM 500 MG/1
500 TABLET ORAL EVERY 12 HOURS SCHEDULED
Qty: 60 TABLET | Refills: 4 | Status: SHIPPED | OUTPATIENT
Start: 2024-08-16 | End: 2024-08-23

## 2024-08-16 RX ORDER — LEVETIRACETAM 500 MG/1
TABLET ORAL
Qty: 180 TABLET | OUTPATIENT
Start: 2024-08-16

## 2024-08-16 RX ORDER — CYANOCOBALAMIN 1000 UG/ML
1000 INJECTION, SOLUTION INTRAMUSCULAR; SUBCUTANEOUS ONCE
Status: CANCELLED | OUTPATIENT
Start: 2024-08-19 | End: 2024-08-16

## 2024-08-16 NOTE — PATIENT INSTRUCTIONS
Keppra 500 mg daily  Decadron stay on 1 mg daily for two weeks; will then discuss remaining taper  MRI pelvis -right lesion on PET  Start chemotherapy-will schedule at [facility] prior    Follow up one week after chemotherapy at [place]

## 2024-08-17 ENCOUNTER — TELEPHONE (OUTPATIENT)
Age: 74
End: 2024-08-17

## 2024-08-17 DIAGNOSIS — C34.11 MALIGNANT NEOPLASM OF UPPER LOBE, RIGHT BRONCHUS OR LUNG (HCC): Primary | ICD-10-CM

## 2024-08-17 RX ORDER — FOLIC ACID 1 MG/1
1 TABLET ORAL DAILY
Qty: 30 TABLET | Refills: 6 | Status: SHIPPED | OUTPATIENT
Start: 2024-08-17

## 2024-08-19 ENCOUNTER — HOSPITAL ENCOUNTER (OUTPATIENT)
Dept: INFUSION CENTER | Facility: HOSPITAL | Age: 74
Discharge: HOME | End: 2024-08-19
Attending: INTERNAL MEDICINE
Payer: MEDICARE

## 2024-08-19 ENCOUNTER — TELEPHONE (OUTPATIENT)
Dept: INFUSION CENTER | Facility: HOSPITAL | Age: 74
End: 2024-08-19

## 2024-08-19 ENCOUNTER — TELEPHONE (OUTPATIENT)
Age: 74
End: 2024-08-19

## 2024-08-19 ENCOUNTER — PATIENT OUTREACH (OUTPATIENT)
Dept: CASE MANAGEMENT | Facility: OTHER | Age: 74
End: 2024-08-19

## 2024-08-19 ENCOUNTER — PATIENT OUTREACH (OUTPATIENT)
Dept: HEMATOLOGY ONCOLOGY | Facility: CLINIC | Age: 74
End: 2024-08-19

## 2024-08-19 ENCOUNTER — TELEPHONE (OUTPATIENT)
Dept: HEMATOLOGY ONCOLOGY | Facility: CLINIC | Age: 74
End: 2024-08-19

## 2024-08-19 DIAGNOSIS — C79.31 MALIGNANT NEOPLASM METASTATIC TO BRAIN (HCC): ICD-10-CM

## 2024-08-19 DIAGNOSIS — C34.11 MALIGNANT NEOPLASM OF UPPER LOBE, RIGHT BRONCHUS OR LUNG (HCC): Primary | ICD-10-CM

## 2024-08-19 PROCEDURE — 96372 THER/PROPH/DIAG INJ SC/IM: CPT

## 2024-08-19 RX ORDER — CYANOCOBALAMIN 1000 UG/ML
1000 INJECTION, SOLUTION INTRAMUSCULAR; SUBCUTANEOUS ONCE
Status: COMPLETED | OUTPATIENT
Start: 2024-08-19 | End: 2024-08-19

## 2024-08-19 RX ADMIN — CYANOCOBALAMIN 1000 MCG: 1000 INJECTION, SOLUTION INTRAMUSCULAR; SUBCUTANEOUS at 15:08

## 2024-08-19 NOTE — TELEPHONE ENCOUNTER
Left message for Sue (patients daughter) stating the FMLA forms she submitted to us have been filled out and faxed back as requested.  Advised copy is available in [employer] and to call back with any further questions or concerns.  [employer] number provided.

## 2024-08-19 NOTE — PROGRESS NOTES
OSW team received referral from PN. Pt scored a 9/10 on her DT, and noted sadness/depression, worry/anxiety, pain, sleep, and memory as concerns. She told PN she does not want SW to outreach at this time. Will close referral, however can be available for support should pt change her mind.

## 2024-08-19 NOTE — TELEPHONE ENCOUNTER
Called central scheduling to see about moving up her MRI pelvis. Next available was on 8/27 for a 4am appointment. After that's is 9/5. Will keep already scheduled appointment and try again later.

## 2024-08-19 NOTE — PROGRESS NOTES
I reached out and spoke with Ayla  now that consults have been completed with the oncology teams to complete the Distress Thermometer, review for any barriers to care and offer supportive services as needed. I reviewed and updated the members assigned to the care team in [facility].   She knows the members of the care team as well as how and when to contact them with any needs.   She verbalizes managing the schedules well.   She is currently unable to drive but is supported by family or friends and denies transportation needs.  Ex  has been taking   She denies any uncontrolled symptoms. Discussed role of Palliative Care in symptom and side effect management. Declined referral at this time.  Patients states that she is eating and drinking as per usual with no unintentional weight loss.     Patient does not smoke.   Patient states she is well supported by family and friends.  Community support groups discussed including the Cancer Support Community of the [facility]. Patient declined information at this time.   Patient feels she has adequate insurance coverage and denies any financial concerns at this time.     Based on individual needs I will follow up in about 4-6 weeks.   I have provided my direct contact information and welcome them to contact me if their needs as discussed above change. They were appreciative for the call.

## 2024-08-21 ENCOUNTER — HOSPITAL ENCOUNTER (INPATIENT)
Facility: HOSPITAL | Age: 74
LOS: 2 days | Discharge: HOME HEALTH | DRG: 054 | End: 2024-08-23
Attending: EMERGENCY MEDICINE | Admitting: INTERNAL MEDICINE
Payer: MEDICARE

## 2024-08-21 ENCOUNTER — TELEPHONE (OUTPATIENT)
Dept: SURGICAL ONCOLOGY | Facility: CLINIC | Age: 74
End: 2024-08-21

## 2024-08-21 ENCOUNTER — APPOINTMENT (EMERGENCY)
Dept: CT IMAGING | Facility: HOSPITAL | Age: 74
DRG: 054 | End: 2024-08-21
Payer: MEDICARE

## 2024-08-21 DIAGNOSIS — C79.31 MALIGNANT NEOPLASM METASTATIC TO BRAIN (HCC): ICD-10-CM

## 2024-08-21 DIAGNOSIS — G93.6 VASOGENIC BRAIN EDEMA (HCC): Primary | ICD-10-CM

## 2024-08-21 DIAGNOSIS — C34.11 MALIGNANT NEOPLASM OF UPPER LOBE, RIGHT BRONCHUS OR LUNG (HCC): ICD-10-CM

## 2024-08-21 DIAGNOSIS — R27.0 ATAXIA: ICD-10-CM

## 2024-08-21 PROBLEM — G93.41 ACUTE METABOLIC ENCEPHALOPATHY: Status: ACTIVE | Noted: 2024-08-21

## 2024-08-21 LAB
ALBUMIN SERPL BCG-MCNC: 4 G/DL (ref 3.5–5)
ALP SERPL-CCNC: 39 U/L (ref 34–104)
ALT SERPL W P-5'-P-CCNC: 8 U/L (ref 7–52)
ANION GAP SERPL CALCULATED.3IONS-SCNC: 6 MMOL/L (ref 4–13)
AST SERPL W P-5'-P-CCNC: 14 U/L (ref 5–45)
ATRIAL RATE: 62 BPM
BASOPHILS # BLD MANUAL: 0.07 THOUSAND/UL (ref 0–0.1)
BASOPHILS NFR MAR MANUAL: 1 % (ref 0–1)
BILIRUB SERPL-MCNC: 0.24 MG/DL (ref 0.2–1)
BUN SERPL-MCNC: 22 MG/DL (ref 5–25)
CALCIUM SERPL-MCNC: 8.9 MG/DL (ref 8.4–10.2)
CHLORIDE SERPL-SCNC: 103 MMOL/L (ref 96–108)
CO2 SERPL-SCNC: 25 MMOL/L (ref 21–32)
CREAT SERPL-MCNC: 1.24 MG/DL (ref 0.6–1.3)
EOSINOPHIL # BLD MANUAL: 0 THOUSAND/UL (ref 0–0.4)
EOSINOPHIL NFR BLD MANUAL: 0 % (ref 0–6)
ERYTHROCYTE [DISTWIDTH] IN BLOOD BY AUTOMATED COUNT: 14.8 % (ref 11.6–15.1)
GLUCOSE SERPL-MCNC: 83 MG/DL (ref 65–140)
GLUCOSE SERPL-MCNC: 90 MG/DL (ref 65–140)
HCT VFR BLD AUTO: 32.6 % (ref 36.5–46.1)
HGB BLD-MCNC: 10.7 G/DL (ref 12–15.4)
LYMPHOCYTES # BLD AUTO: 0.52 THOUSAND/UL (ref 0.6–4.47)
LYMPHOCYTES # BLD AUTO: 7 % (ref 14–44)
MAGNESIUM SERPL-MCNC: 1.9 MG/DL (ref 1.9–2.7)
MCH RBC QN AUTO: 32.8 PG (ref 26.8–34.3)
MCHC RBC AUTO-ENTMCNC: 32.8 G/DL (ref 31.4–37.4)
MCV RBC AUTO: 100 FL (ref 82–98)
MONOCYTES # BLD AUTO: 0.22 THOUSAND/UL (ref 0–1.22)
MONOCYTES NFR BLD: 3 % (ref 4–12)
NEUTROPHILS # BLD MANUAL: 6.57 THOUSAND/UL (ref 1.85–7.62)
NEUTS BAND NFR BLD MANUAL: 2 % (ref 0–8)
NEUTS SEG NFR BLD AUTO: 87 % (ref 43–75)
P AXIS: 64 DEGREES
PLATELET # BLD AUTO: 172 THOUSANDS/UL (ref 149–390)
PLATELET BLD QL SMEAR: ADEQUATE
PMV BLD AUTO: 8.7 FL (ref 8.9–12.7)
POTASSIUM SERPL-SCNC: 4.2 MMOL/L (ref 3.5–5.3)
PR INTERVAL: 152 MS
PROT SERPL-MCNC: 6.4 G/DL (ref 6.4–8.4)
QRS AXIS: 37 DEGREES
QRSD INTERVAL: 68 MS
QT INTERVAL: 434 MS
QTC INTERVAL: 440 MS
RBC # BLD AUTO: 3.26 MILLION/UL (ref 3.88–5.12)
RBC MORPH BLD: NORMAL
SODIUM SERPL-SCNC: 134 MMOL/L (ref 135–147)
T WAVE AXIS: 38 DEGREES
VENTRICULAR RATE: 62 BPM
WBC # BLD AUTO: 7.38 THOUSAND/UL (ref 4.31–10.16)

## 2024-08-21 PROCEDURE — 85007 BL SMEAR W/DIFF WBC COUNT: CPT | Performed by: EMERGENCY MEDICINE

## 2024-08-21 PROCEDURE — 83735 ASSAY OF MAGNESIUM: CPT | Performed by: EMERGENCY MEDICINE

## 2024-08-21 PROCEDURE — 93010 ELECTROCARDIOGRAM REPORT: CPT | Performed by: INTERNAL MEDICINE

## 2024-08-21 PROCEDURE — 99232 SBSQ HOSP IP/OBS MODERATE 35: CPT | Performed by: RADIOLOGY

## 2024-08-21 PROCEDURE — 85027 COMPLETE CBC AUTOMATED: CPT | Performed by: EMERGENCY MEDICINE

## 2024-08-21 PROCEDURE — 99285 EMERGENCY DEPT VISIT HI MDM: CPT

## 2024-08-21 PROCEDURE — 93005 ELECTROCARDIOGRAM TRACING: CPT

## 2024-08-21 PROCEDURE — 36415 COLL VENOUS BLD VENIPUNCTURE: CPT | Performed by: EMERGENCY MEDICINE

## 2024-08-21 PROCEDURE — 80053 COMPREHEN METABOLIC PANEL: CPT | Performed by: EMERGENCY MEDICINE

## 2024-08-21 PROCEDURE — 99223 1ST HOSP IP/OBS HIGH 75: CPT | Performed by: INTERNAL MEDICINE

## 2024-08-21 PROCEDURE — 99285 EMERGENCY DEPT VISIT HI MDM: CPT | Performed by: EMERGENCY MEDICINE

## 2024-08-21 PROCEDURE — 82948 REAGENT STRIP/BLOOD GLUCOSE: CPT

## 2024-08-21 PROCEDURE — 70450 CT HEAD/BRAIN W/O DYE: CPT

## 2024-08-21 RX ORDER — DEXAMETHASONE SODIUM PHOSPHATE 10 MG/ML
10 INJECTION, SOLUTION INTRAMUSCULAR; INTRAVENOUS ONCE
Status: COMPLETED | OUTPATIENT
Start: 2024-08-21 | End: 2024-08-21

## 2024-08-21 RX ORDER — ASPIRIN 81 MG/1
81 TABLET, CHEWABLE ORAL DAILY
Status: DISCONTINUED | OUTPATIENT
Start: 2024-08-22 | End: 2024-08-23 | Stop reason: HOSPADM

## 2024-08-21 RX ORDER — ATORVASTATIN CALCIUM 40 MG/1
40 TABLET, FILM COATED ORAL EVERY EVENING
Status: DISCONTINUED | OUTPATIENT
Start: 2024-08-21 | End: 2024-08-23 | Stop reason: HOSPADM

## 2024-08-21 RX ORDER — ACETAMINOPHEN 325 MG/1
650 TABLET ORAL EVERY 4 HOURS PRN
Status: DISCONTINUED | OUTPATIENT
Start: 2024-08-21 | End: 2024-08-23 | Stop reason: HOSPADM

## 2024-08-21 RX ORDER — LANOLIN ALCOHOL/MO/W.PET/CERES
3 CREAM (GRAM) TOPICAL ONCE
Status: COMPLETED | OUTPATIENT
Start: 2024-08-21 | End: 2024-08-21

## 2024-08-21 RX ORDER — ONDANSETRON 2 MG/ML
4 INJECTION INTRAMUSCULAR; INTRAVENOUS EVERY 6 HOURS PRN
Status: DISCONTINUED | OUTPATIENT
Start: 2024-08-21 | End: 2024-08-23 | Stop reason: HOSPADM

## 2024-08-21 RX ORDER — LEVETIRACETAM 500 MG/1
750 TABLET ORAL 2 TIMES DAILY
Status: DISCONTINUED | OUTPATIENT
Start: 2024-08-21 | End: 2024-08-23 | Stop reason: HOSPADM

## 2024-08-21 RX ORDER — DIPHENHYDRAMINE HCL 25 MG
25 TABLET ORAL EVERY 8 HOURS PRN
Status: DISCONTINUED | OUTPATIENT
Start: 2024-08-21 | End: 2024-08-23 | Stop reason: HOSPADM

## 2024-08-21 RX ORDER — ACETAMINOPHEN 325 MG/1
975 TABLET ORAL ONCE
Status: COMPLETED | OUTPATIENT
Start: 2024-08-21 | End: 2024-08-21

## 2024-08-21 RX ORDER — SULFAMETHOXAZOLE/TRIMETHOPRIM 800-160 MG
1 TABLET ORAL EVERY 12 HOURS SCHEDULED
Status: DISCONTINUED | OUTPATIENT
Start: 2024-08-21 | End: 2024-08-23 | Stop reason: HOSPADM

## 2024-08-21 RX ORDER — PANTOPRAZOLE SODIUM 40 MG/1
40 TABLET, DELAYED RELEASE ORAL
Status: DISCONTINUED | OUTPATIENT
Start: 2024-08-22 | End: 2024-08-23 | Stop reason: HOSPADM

## 2024-08-21 RX ORDER — ENOXAPARIN SODIUM 100 MG/ML
30 INJECTION SUBCUTANEOUS DAILY
Status: DISCONTINUED | OUTPATIENT
Start: 2024-08-22 | End: 2024-08-23 | Stop reason: HOSPADM

## 2024-08-21 RX ORDER — DEXAMETHASONE SODIUM PHOSPHATE 4 MG/ML
4 INJECTION, SOLUTION INTRA-ARTICULAR; INTRALESIONAL; INTRAMUSCULAR; INTRAVENOUS; SOFT TISSUE EVERY 6 HOURS SCHEDULED
Status: DISCONTINUED | OUTPATIENT
Start: 2024-08-21 | End: 2024-08-22

## 2024-08-21 RX ORDER — LACTOBACILLUS ACIDOPHILUS / LACTOBACILLUS BULGARICUS 100 MILLION CFU STRENGTH
1 GRANULES ORAL 2 TIMES DAILY
Status: DISCONTINUED | OUTPATIENT
Start: 2024-08-21 | End: 2024-08-23 | Stop reason: HOSPADM

## 2024-08-21 RX ORDER — FOLIC ACID 1 MG/1
1 TABLET ORAL DAILY
Status: DISCONTINUED | OUTPATIENT
Start: 2024-08-22 | End: 2024-08-23 | Stop reason: HOSPADM

## 2024-08-21 RX ADMIN — Medication 3 MG: at 21:14

## 2024-08-21 RX ADMIN — ACETAMINOPHEN 975 MG: 325 TABLET, FILM COATED ORAL at 08:39

## 2024-08-21 RX ADMIN — SULFAMETHOXAZOLE AND TRIMETHOPRIM 1 TABLET: 800; 160 TABLET ORAL at 20:41

## 2024-08-21 RX ADMIN — LEVETIRACETAM 750 MG: 500 TABLET, FILM COATED ORAL at 20:38

## 2024-08-21 RX ADMIN — DEXAMETHASONE SODIUM PHOSPHATE 4 MG: 4 INJECTION INTRA-ARTICULAR; INTRALESIONAL; INTRAMUSCULAR; INTRAVENOUS; SOFT TISSUE at 15:38

## 2024-08-21 RX ADMIN — DEXAMETHASONE SODIUM PHOSPHATE 10 MG: 10 INJECTION INTRAMUSCULAR; INTRAVENOUS at 10:57

## 2024-08-21 NOTE — CONSULTS
"  [facility]emat[facility]/On[facility] Specialists  Consultation Note  Encounter: [id number]     PATIENT INFO     Name: Ayla Nye  YOB: 1950   Age: 73 y.o.   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  New brain mets.  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 adult with history of JAK2 positive essential thrombocytosis [previously on hydroxyurea which was discontinued - nl plt], recent diagnosis of Stage IIIB (cT2b N3 M0) adenocarcinoma lung (Caris, Kras C12V, p53, PDL1 TPS 5%, TMB 16 mut/Mb, other  mutations negative) s/p carboplatin/paclitaxel/RT (treatment end- 7/5/2024) was found to have brain mets (2.0x.4 left occiput as well as multiple other smaller lesions --7/13/2024) s/p SRS 6 fractions completed (8/8/2024) at [place] (planned to start on carboplatin/pemetrexed/Pembro) was admitted on account of gradually worsening \"brain fogginess\", un-stable gait, disorientation and CT of the head indicated worsening known vasogenic edema in the left parietal/occipital lobes.  Patient was started on high-dose steroids with a new taper regimen as per radiation oncology.  Patient feels much better at this point.  She is oriented, alert.  From oncology standpoint patient is good to be discharged with outpatient follow-up.       HISTORY OF PRESENT ILLNESS       Ayla Nye is a 73 y.o. adult with history of JAK2 positive essential thrombocytosis [previously on hydroxyurea which was discontinued - nl plt], recent diagnosis of Stage IIIB (cT2b N3 M0) adenocarcinoma lung (Caris, Kras C12V, p53, " "PDL1 TPS 5%, TMB 16 mut/Mb, other  mutations negative) s/p carboplatin/paclitaxel/RT (treatment end- 7/5/2024) was found to have brain mets (2x1.4cm left occipital as well as multiple other smaller lesions --7/30/2024) s/p SRS 6 fractions x1 completed (8/8/2024) at [place] (planned to start on carboplatin/pemetrexed/Pembro) was admitted on account of gradually worsening \"brain fogginess\", un-stable gait, disorientation. CT of the head indicated worsening known vasogenic edema in the left parietal/occipital lobes.  Repeat PET CT scan was done on 8/5/2024 which indicated new hypermetabolic soft tissue mass in the right anal rectal region, persistent hypermetabolic right upper lobe bronchogenic carcinoma with hypermetabolic mediastinal and right hilar yaw metastatic disease [improved], hypermetabolic left occipital lobe metastatic disease.  MRI of the pelvis to further evaluate the mass was ordered outpatient but patient is currently admitted.  He was on taper dose of dexamethasone at home [1 mg p.o. daily].   Patient is known to our service.  Follows up with oncology on outpatient basis.  Besides confusion, fogginess, unsteady gait she has no other complaints.     REVIEW OF SYSTEMS     CONSTITUTIONAL: Denies any fever, chills, rigors, and weight loss  HEENT: No earache or tinnitus, denies hearing loss or visual disturbances  CARDIOVASCULAR: No chest pain or palpitations  RESPIRATORY: Denies any cough, hemoptysis, shortness of breath or dyspnea on exertion  GASTROINTESTINAL: No diarrhea or constipation  GENITOURINARY: No problems with urination, denies any hematuria or dysuria  NEUROLOGIC: Brain fog, confusion, no dizziness or vertigo, denies headaches   MUSCULOSKELETAL: Denies any muscle or joint pain   SKIN: Denies skin rashes or itching      Historical Information   Past Medical History:   Diagnosis Date    Allergic 2022    Allergic to neomiacin and cephalexin    Cataract Nov. 2023    Due to have durgery June "     Essential thrombocytosis (HCC)     controlled with medication    Hyperlipidemia     Hypertension     Mass in chest     Nodular goiter     Pneumonia Oct 16, 2023    Also  2024    Psoriasis     SIRS (systemic inflammatory response syndrome) (HCC) 2024     Past Surgical History:   Procedure Laterality Date    APPENDECTOMY      BREAST CYST EXCISION Right     COLONOSCOPY  10/31/2018    DXA PROCEDURE (HISTORICAL)  2017    MAMMO (HISTORICAL)      8-24-18    MAMMO NEEDLE LOCALIZATION RIGHT (ALL INC) Right 2009    SKIN LESION EXCISION      bridge of nose     Social History   Social History     Substance and Sexual Activity   Alcohol Use Not Currently     Social History     Substance and Sexual Activity   Drug Use Never     Social History     Tobacco Use   Smoking Status Former    Current packs/day: 1.00    Average packs/day: 1 pack/day for 58.6 years (58.6 ttl pk-yrs)    Types: Cigarettes    Start date:     Passive exposure: Past   Smokeless Tobacco Never   Tobacco Comments    Quit      Family History   Problem Relation Age of Onset    Stroke Mother     Depression Mother             Hypertension Mother     Hearing loss Mother     Tuberculosis Father     Cancer Brother         lung    Tuberculosis Brother     Coronary artery disease Brother     Hypertension Brother     No Known Problems Daughter     No Known Problems Daughter     No Known Problems Maternal Grandmother     No Known Problems Maternal Grandfather     No Known Problems Paternal Grandmother     No Known Problems Paternal Grandfather     No Known Problems Maternal Aunt     No Known Problems Maternal Aunt     No Known Problems Maternal Aunt     No Known Problems Maternal Aunt     No Known Problems Paternal Aunt     Cancer Brother         Throat cancer        MEDICATIONS & ALLERGIES     Meds/Allergies   Not in a hospital admission.  Current Facility-Administered Medications   Medication Dose Route Frequency     dexamethasone (PF) (DECADRON) injection 10 mg  10 mg Intravenous Once     Allergies   Allergen Reactions    Doxycycline Headache    Keflex [Cephalexin] Rash    Neomycin-Polymyxin-Dexameth Eye Swelling        PHYSICAL EXAM     Objective   Blood pressure 164/73, pulse 59, temperature 97.5 °F (36.4 °C), temperature source Oral, resp. rate 20, weight 50 kg (110 lb 3.7 oz), SpO2 100%. Body mass index is 20.16 kg/m².  No intake or output data in the 24 hours ending 08/21/24 1055  Medication Administration - last 24 hours from 08/20/2024 1055 to 08/21/2024 1055         Date/Time Order Dose Route Action Action by     08/21/2024 0839 EDT acetaminophen (TYLENOL) tablet 975 mg 975 mg Oral Given Ny Lopez RN            General Appearance:   Alert, cooperative, no distress   HEENT:   Normocephalic, atraumatic, anicteric     Lungs:   Equal chest rise, respirations unlabored    Heart:   Regular rate and rhythm   Abdomen:   Soft, non-tender, non-distended; normal bowel sounds; no masses, no organomegaly    Extremities:   No cyanosis, clubbing or edema    Neuro:   Moves all 4 extremities    Skin:   No jaundice, rashes, or lesions      Invasive Devices       Peripheral Intravenous Line  Duration             Peripheral IV 08/21/24 Right Antecubital <1 day                     LABORATORY RESULTS     Admission on 08/21/2024   Component Date Value    Ventricular Rate 08/21/2024 62     Atrial Rate 08/21/2024 62     NC Interval 08/21/2024 152     QRSD Interval 08/21/2024 68     QT Interval 08/21/2024 434     QTC Interval 08/21/2024 440     P Axis 08/21/2024 64     QRS [place] 08/21/2024 37     T Wave [place] 08/21/2024 38     WBC 08/21/2024 7.38     RBC 08/21/2024 3.26 (L)     Hemoglobin 08/21/2024 10.7 (L)     Hematocrit 08/21/2024 32.6 (L)     MCV 08/21/2024 100 (H)     MCH 08/21/2024 32.8     MCHC 08/21/2024 32.8     RDW 08/21/2024 14.8     MPV 08/21/2024 8.7 (L)     Platelets 08/21/2024 172     Sodium 08/21/2024 134 (L)     Potassium  08/21/2024 4.2     Chloride 08/21/2024 103     CO2 08/21/2024 25     ANION GAP 08/21/2024 6     BUN 08/21/2024 22     Creatinine 08/21/2024 1.24     Glucose 08/21/2024 90     Calcium 08/21/2024 8.9     AST 08/21/2024 14     ALT 08/21/2024 8     Alkaline Phosphatase 08/21/2024 39     Total Protein 08/21/2024 6.4     Albumin 08/21/2024 4.0     Total Bilirubin 08/21/2024 0.24     Magnesium 08/21/2024 1.9     Segmented % 08/21/2024 87 (H)     Bands % 08/21/2024 2     Lymphocytes % 08/21/2024 7 (L)     Monocytes % 08/21/2024 3 (L)     Eosinophils % 08/21/2024 0     Basophils % 08/21/2024 1     Absolute Neutrophils 08/21/2024 6.57     Absolute Lymphocytes 08/21/2024 0.52 (L)     Absolute Monocytes 08/21/2024 0.22     Absolute Eosinophils 08/21/2024 0.00     Absolute Basophils 08/21/2024 0.07     RBC Morphology 08/21/2024 Normal     Platelet Estimate 08/21/2024 Adequate     POC Glucose 08/21/2024 83         IMAGING RESULTS     CT head without contrast    Result Date: 8/21/2024  Narrative: CT BRAIN - WITHOUT CONTRAST INDICATION:   History of lung cancer with metastatic brain lesions, dysmetria. COMPARISON: Head CT from 8/5/2024, prior brain MR from 7/30/2024, and prior head CT from 7/29/2024 TECHNIQUE:  CT examination of the brain was performed.  Multiplanar 2D reformatted images were created from the source data. Radiation dose length product (DLP) for this visit:  1003 mGy-cm .  This examination, like all CT scans performed in the [facility], was performed utilizing techniques to minimize radiation dose exposure, including the use of iterative reconstruction and automated exposure control. IMAGE QUALITY:  Diagnostic. FINDINGS: PARENCHYMA: There is worsening vasogenic edema involving the left parietal, and occipital lobes, related to the left occipital enhancing metastatic lesion better depicted on prior brain MRI. There is also worsening low attenuating vasogenic edema involving the subcortical  parafalcine left frontal lobe, corresponding to the metastatic lesion in this region identified on prior brain MRI. No associated intraparenchymal hemorrhage is noted. No midline shift is noted. There are additional periventricular white matter low-attenuation areas involving both cerebral hemispheres, which statistically like represent mild chronic microangiopathic changes. VENTRICLES AND EXTRA-AXIAL SPACES: There is mass effect on the posterior body and trigone of the left lateral ventricle due to surrounding vasogenic edema. VISUALIZED ORBITS: Normal visualized orbits. PARANASAL SINUSES: Normal visualized paranasal sinuses. CALVARIUM AND EXTRACRANIAL SOFT TISSUES:  Normal.     Impression: 1. Worsening vasogenic edema in particular involving the left parietal and occipital lobes, corresponding to worsening vasogenic edema surrounding the patient's known left occipital lobe metastasis as better depicted on prior brain MRI. This results in mass effect on the posterior body and trigone of left lateral ventricle.. 2. Worsening vasogenic edema involving the parafalcine left frontal lobe at the site of the patient's known intracranial metastasis in this region as seen on prior brain MRI. 3. No intracranial hemorrhage noted. No midline shift. The study was marked in EPIC for immediate notification. Workstation performed: [id number]     NM PET CT skull base to mid thigh    Result Date: 8/5/2024  Narrative: PET/CT SCAN INDICATION: C34.91: Malignant neoplasm of unspecified part of right bronchus or lung C76.1: Malignant neoplasm of thorax, restaging MODIFIER: PS COMPARISON: PET/CT dated 3/22/2024, brain MRI dated 7/30/2024, CT of chest, abdomen, and pelvis dated 7/3/2024 CELL TYPE:  metastatic non-small cell carcinoma (bx: 3/26/24 4R, 10R, 4L LNs +) TECHNIQUE:   8.7 mCi F-18-FDG administered IV. Multiplanar attenuation corrected and non attenuation corrected PET images are available for interpretation, and contiguous, low  dose, axial CT sections were obtained from the skull vertex through the femurs. Intravenous contrast material was not utilized. This examination, like all CT scans performed in the [facility], was performed utilizing techniques to minimize radiation dose exposure, including the use of iterative reconstruction and automated exposure control. Fasting serum glucose: 78 mg/dl FINDINGS: VISUALIZED BRAIN: Focal FDG activity involving the left occipital lobe on PET image 53 with max SUV of 11.0 corresponding to patient's known metastatic lesion which was better characterized on MRI dated 7/30/2024; this finding is associated with adjacent regional photopenia which could be related to underlying vasogenic edema seen on MRI. Patient's known additional metastatic lesions seen on recent MRI are not definitively visualized on current PET/CT and could be obscured by normal background physiologic brain activity. HEAD/NECK: There is a physiologic distribution of FDG. No FDG avid cervical adenopathy is seen. CT images: Intracranial atherosclerotic calcification is noted. CHEST: Persistent hypermetabolic medial right upper lobe lung mass measuring 3.3 x 2.7 cm on image 132 of series 3 with max SUV of 10.0, previously measuring 3.7 x 2.9 cm with max SUV of 18.0 when utilizing similar measurement technique. On image 132 of series 3 there is a stable adjacent 7 mm right upper lobe lung nodule with max SUV of 2.1, previously similar in size with max SUV of 1.7 Improving hypermetabolic right paratracheal mediastinal and right hilar yaw metastatic disease. For example, on image 137, hypermetabolic right paratracheal lymph node measures 7 mm in short axis with max SUV of 3.7, previously approximately 1.3 cm with max SUV of 13.0. Stable mildly FDG avid semisolid groundglass nodular opacity involving the left upper lobe measuring 2.6 x 1.1 cm on image 142 of series 3 with max SUV of 1.9, previously similar in size with max  SUV of 2.2. CT images: Atherosclerotic vascular calcifications including those of the coronary arteries are noted. Emphysematous changes. ABDOMEN: No FDG avid soft tissue lesions are seen. CT images: Atherosclerotic vascular calcifications are noted. Tiny nonobstructing right renal calculus. PELVIS: On image 272 series 3 there is a new hypermetabolic soft tissue mass involving the fat in the right anorectal region medial to the right ischial tuberosity measuring 2.7 x 2.0 cm with max SUV of 9.9; this finding seems to be in an atypical distribution from patient's primary bronchogenic carcinoma and could reflect a separate malignant process. Further evaluation with tissue sampling is recommended for further characterization as clinically indicated. This finding has significantly increased in size since 7/3/2024 where it measured 1.0 cm. CT images: Unremarkable. OSSEOUS STRUCTURES: No FDG avid lesions are seen. CT images: Possible osteopenia. Degenerative changes are noted involving the spine.     Impression: 1. New hypermetabolic soft tissue mass involving the right anorectal fat medial to the right ischial tuberosity. While this finding could reflect hypermetabolic metastatic disease related to patient's primary bronchogenic carcinoma the distribution is atypical for metastatic disease from bronchogenic carcinoma. Therefore, consider further evaluation with tissue sampling to exclude secondary hypermetabolic malignancy. 2. Persistent hypermetabolic right upper lobe bronchogenic carcinoma with hypermetabolic mediastinal and right hilar yaw metastatic disease which overall is improved since prior PET/CT. 3. Hypermetabolic left occipital lobe metastatic disease. Patient's known intracranial metastatic disease was better characterized on prior MRI of the brain. 4. Stable mildly FDG avid left upper lobe groundglass nodule suspicious for malignancy of low metabolic activity such as bronchoalveolar carcinoma. Please see  above for details and additional findings. The study was marked in EPIC for significant notification. Workstation performed: [id number]     EEG awake or drowsy routine    Result Date: 2024  Narrative: Table formatting from the original result was not included. ELECTROENCEPHALOGRAM (EEG) Patient Name:  Ayla Nye  MRN: [id number] :  1950 File #: [id number] Age: 73 y.o.  Date performed: 24        Report date: 2024      Study type: Routine EEG ICD 10 diagnosis: Transient neurological symptoms R29.818 Start time: 8:48 End time: 9:21 --------------------------------------------------------------------------- ---------------------------------------- Patient History: This recording was observed in a 73 y.o. female w/ metastatic NSCLC and multifocal brain metastases (including lefto frontal and occipital lobe) to determine whether spells of focal right motor and right hemianopia are seizures. Medications include: levetiracetam, dexamethasone --------------------------------------------------------------------------- ---------------------------------------- Description of Procedure: 32 channel digital recording with electrodes placed according to the International 10-20 system with additional T1/T2 electrodes, EOG, EKG, and simultaneous video. The recording was technically satisfactory. --------------------------------------------------------------------------- ---------------------------------------- EEG Description: The recording was performed with the patient awake and drowsy. She was fully oriented. During wakefulness, there were long runs of well regulated, low amplitude, posteriorly dominant, asymmetric, 9.5-10.5 cps alpha rhythm that attenuated with eye opening which was better defined, higher amplitude and slightly faster on the right. There were symmetric low amplitude, frontally dominant beta activities. There were intermittent, very brief (1-2 seconds), left occipitally predominant,  low to medium amplitude mixed frequency, polymorphic 5.5-8 cps theta and 2-3 cps delta activities. With drowsiness, alpha activity attenuated and was replaced by diffusely distributed theta activities. Deeper sleep was not captured. --------------------------------------------------------------------------- ---------------------------------------- Activation Procedures: Hyperventilation was not performed. Stepped photic stimulation between 1-30 cps was performed and induced bilateral photic driving. Other findings: The single lead ECG demonstrated regular rhythm --------------------------------------------------------------------------- ---------------------------------------- EEG Interpretation: This Routine EEG recorded during wakefulness and drowsiness is abnormal. Intermittent, left occipital predominant, mixed theta and delta slowing suggests left occipital focal neuronal dysfunction. Yulissa Eddy MD Clinical Neurophysiology Fellow [facility] Loco Crooks MD Attending Epileptologist [facility]    Echo complete w/ contrast if indicated    Result Date: 7/30/2024  Narrative:   Left Ventricle: Left ventricular cavity size is normal. Wall thickness is normal. The left ventricular ejection fraction is 60%. Systolic function is normal. Wall motion is normal. Diastolic function is normal.   Right Ventricle: Right ventricular cavity size is mildly dilated. Systolic function is normal.   Atrial Septum: There is a patent foramen ovale.  Bidirectional shunting with bubbles visualized in the LV.   Mitral Valve: There is mild annular calcification.   Tricuspid Valve: There is mild regurgitation. The estimated right ventricular systolic pressure is 29.00 mmHg.     MRI brain w wo contrast    Result Date: 7/30/2024  Narrative: MRI BRAIN WITH AND WITHOUT CONTRAST INDICATION: Dizziness. COMPARISON: CT head 7/29/2024. TECHNIQUE:  Multiplanar, multisequence imaging of the brain was performed before and after gadolinium administration. IV Contrast:  5 mL of Gadobutrol injection (SINGLE-DOSE) IMAGE QUALITY:   Diagnostic. FINDINGS: BRAIN PARENCHYMA: No acute infarct or midline shift. Multiple enhancing lesions are seen supratentorially and infratentorially, with associated vasogenic edema. Lesions as referenced below: 1. 2.0 x 1.4 cm lesion in the left occipital lobe on series 10, image 16 with adjacent vasogenic edema involving the left occipital lobe and extending into the left periatrial white matter, where there is mild mass effect on the adjacent lateral ventricle. There is an internal, diffusion restricting 8 mm component. 2. 4 mm enhancing lesion in the left paramedian frontal lobe on series 10, image 20 with mild adjacent mass effect and vasogenic edema and a small focus of susceptibility likely reflective of hemorrhage. 3. 3 mm lesion in the left paramedian parietal lobe on series 10, image 20 with mild mass effect and vasogenic edema. 4. 3 mm lesion in the midline cerebellum on series 10, image 9 with adjacent vasogenic edema which results in mild mass effect on the fourth ventricle. 5. 2 mm lesion in the left frontal lobe (series 11, image 129) VENTRICLES:  Normal for the patient's age. SELLA AND PITUITARY GLAND:  Normal. ORBITS: No acute abnormality. PARANASAL SINUSES: Essentially clear. VASCULATURE:  Evaluation of the major intracranial vasculature demonstrates appropriate flow voids. CALVARIUM AND SKULL BASE: No acute abnormality. EXTRACRANIAL SOFT TISSUES: No acute abnormality.     Impression: 1. Multiple supratentorial and infratentorial enhancing lesions with associated vasogenic edema, the largest of which is in the left occipital lobe. Findings are most consistent with metastases. 2. No acute infarct or midline shift. The study was marked in EPIC for immediate notification. Workstation performed: [id number]     CT stroke alert  brain    Result Date: 7/29/2024  Narrative: CT BRAIN - STROKE ALERT PROTOCOL INDICATION:   Stroke Alert. COMPARISON: 3/28/2024. TECHNIQUE:  CT examination of the brain was performed.  In addition to axial images, coronal reformatted images were created and submitted for interpretation. Radiation dose length product (DLP) for this visit:  912 mGy-cm .  This examination, like all CT scans performed in the [facility], was performed utilizing techniques to minimize radiation dose exposure, including the use of iterative reconstruction and automated exposure control. IMAGE QUALITY:  Diagnostic. FINDINGS: PARENCHYMA: Area of hypoattenuation in the left superior frontal region adjacent to the interhemispheric fissure with preservation of the cortex, possibly indicating vasogenic edema.. Periventricular and subcortical hypoattenuating foci consistent with microangiopathic disease. No acute intracranial hemorrhage or mass effect. VENTRICLES AND EXTRA-AXIAL SPACES: No hydrocephalus or extra-axial collection. VISUALIZED ORBITS: Intact. PARANASAL SINUSES: Clear. CALVARIUM AND EXTRACRANIAL SOFT TISSUES:   No lytic or blastic lesion or fracture.     Impression: 1. Small area of hypoattenuation in the left superior frontal lobe and larger area in the left posterior parietal region, suggestive of vasogenic edema. Findings are concerning for occult underlying metastases. Acute infarcts cannot be excluded however infarcts in 2 vascular territories and with findings suggesting subacute infarcts, not agreement with the clinical timeline suggest stroke is less likely. 2. No intracranial hemorrhage. Findings were directly discussed with Percy Montalvo  at    8:20 PM . Workstation performed: [id number]     CTA stroke alert (head/neck)    Result Date: 7/29/2024  Narrative: CTA NECK AND BRAIN WITH AND WITHOUT CONTRAST INDICATION: Stroke Alert COMPARISON: 3/28/2024 TECHNIQUE:  Post contrast imaging was performed after  administration of iodinated contrast through the neck and brain. Post contrast axial 0.625 mm images timed to opacify the arterial system.  3D rendering was performed on an independent workstation.   MIP reconstructions performed. Coronal and sagittal reconstructions were performed of the non contrast portion of the brain. Radiation dose length product (DLP) for this visit:  338 mGy-cm .  This examination, like all CT scans performed in the  Network, was performed utilizing techniques to minimize radiation dose exposure, including the use of iterative reconstruction and automated exposure control. IV Contrast:  85 mL of iohexol (OMNIPAQUE) IMAGE QUALITY:   Diagnostic FINDINGS: CTA NECK ARCH AND GREAT VESSELS: Mild atherosclerotic plaque in the aortic arch. No stenosis in the subclavian arteries. VERTEBRAL ARTERIES: Left vertebral artery arises directly from the aortic arch. No stenosis. RIGHT CAROTID: No stenosis.    No dissection. LEFT CAROTID: No stenosis.    No dissection. NASCET criteria was used to determine the degree of internal carotid artery diameter stenosis. CTA BRAIN: INTERNAL CAROTID ARTERIES: Minimal calcified plaque in the carotid siphons without hemodynamically significant stenosis. ANTERIOR CEREBRAL ARTERY CIRCULATION:  No stenosis or occlusion. MIDDLE CEREBRAL ARTERY CIRCULATION:  No stenosis or occlusion. DISTAL VERTEBRAL ARTERIES:  No stenosis or occlusion. BASILAR ARTERY:  No stenosis or occlusion. POSTERIOR CEREBRAL ARTERIES: No stenosis or occlusion. VENOUS STRUCTURES: Patent dural venous sinuses. NON VASCULAR ANATOMY BONY STRUCTURES:  No acute osseous abnormality. SOFT TISSUES OF THE NECK: Subcentimeter cystic lesions in the thyroid. THORACIC INLET: Partially visualized mediastinal lymphadenopathy, stable. Partially visualized right upper lobe mass, not significantly changed.     Impression: No hemodynamically significant stenosis, dissection or occlusion of the carotid or  vertebral arteries or major vessels of the [place].. Findings were directly discussed with Percy Montalvo  at  8:29 PM  . Workstation performed: [id number]     I have personally reviewed pertinent imaging study reports.      Melida Sykes M.D.  [facility]  Division of Hematology & Oncology  Available on TigerText  [email or web address]    ** Please Note: This note is constructed using a voice recognition dictation system. **

## 2024-08-21 NOTE — CONSULTS
"Inpatient Consultation - Radiation Oncology   Ayla Nye 73 y.o. adult MRN: [id number]  Unit/Bed#: S -01 Encounter: [id number]. Consult Requested by Bro Roman MD  Inpatient consult to Radiation Oncology  Consult performed by: Wander Rios MD  Consult ordered by: Bro Roman MD        Reason for Consult / Principal Problem: AMS  Hx and PE limited by: None    Assessment & Plan   Ayla Nye is a 73 y.o. female known to radiation oncology for [id number] (IIIB) NSCLC of the right upper lobe, s/p concurrent chemoRT completing on 7/5/24. She was then found to have 5 intracranial metastatic lesions, which were treated with SRS on 8/8/24.  She was placed on a dexamethasone taper, and per the patient was down to 1 mg dexamethasone daily, at which point she began to develop worsening \"fogginess\" and unsteadiness for the past two days. CT head w/o contrast in the ED revealed worsening vasogenic edema in the vicinity of a recently treated L occipital lesion.  She received dexamethasone 10 mg X 1 in the ED.    At this time, approximately 90 minutes since receiving the dexamethasone, the patient already is feeling much better.  Speaking to her today at the bedside, she seems cognitively quite well conversing fluently and appropriately.  She did have pre-existing vasogenic edema around the left occipital metastatic lesion, which has clearly worsened since her radiosurgery.  She most likely simply needs a more protracted dexamethasone taper.  At this point, we would recommend 4 mg p.o. twice daily for the next 48 hours. If there is any kind of set-back in her mental or neurologic status, would obtain MRI Brain BT protocol w and w/o contrast, but there is no need for repeat MRI at this time.  After 48 hrs, would recommend slow taper, 4mg AM + 2mg PM x 3 days, then 2mg BID x 3 days, then 2mg AM + 1 mg PM x 3 days, then 1 mg BID x 3 days, then 1 mg daily x 3 days, then stop.  If at any point, " "she feels recurrence of her \"fogginess\", she should increase to the prior dose level and contact our Department for further recommendations.     Wander Rios MD  Department of Radiation Oncology  [facility]    History of Present Illness   HPI: Ayla Nye is a 73 y.o. female known to radiation oncology for [id number] (IIIB) NSCLC of the right upper lobe, s/p concurrent chemoRT completing on 7/5/24. She was then found to have 5 intracranial metastatic lesions, which were treated with SRS on 8/8/24.  She was placed on a dexamethasone taper, and per the patient was down to 1 mg dexamethasone daily, at which point she began to develop worsening \"fogginess\" and unsteadiness for the past two days.  She also reports a few episodes of significant headache.  She presented to the ED earlier today with repeat CT head w/o contrast revealing worsening vasogenic edema in the vicinity of a recently treated L occipital lesion.  She received dexamethasone 10 mg X 1 in the ED and has been admitted for observation.    The patient is seen today at the bedside, already noting significant improvement in her mental status since the dexamethasone was administered. She has no HA at this time.     Prior radiation: yes  Pacemaker: no    Review of Systems As per the above HPI.  Otherwise without complaints.    Historical Information   Oncology History   Malignant neoplasm of upper lobe, right bronchus or lung (HCC)   2024 Initial Diagnosis    Primary lung adenocarcinoma, right (HCC)     3/26/2024 Biopsy    A-C. Lymph Node, Level 4R :    - Metastatic non-small cell carcinoma, most compatible with a primary lung adenocarcinoma; see note.       D-F. Lymph Node, Level 10R:    - Metastatic non-small cell carcinoma; see note.       G-I. Lymph Node, Level 4L:    - Metastatic non-small cell carcinoma; see note.       4/15/2024 -  Cancer Staged    Staging form: Lung, AJCC 8th Edition  - Clinical stage from 4/15/2024: " Stage IIIB (cT2b, cN3, cM0) - Signed by Rogelio Beebe DO on 4/15/2024       5/23/2024 - 7/2/2024 Chemotherapy    alteplase (CATHFLO), 2 mg, Intracatheter, Every 1 Minute as needed, 6 of 6 cycles  CARBOplatin (PARAPLATIN) IVPB (GOG AUC DOSING), 130.4 mg, Intravenous, Once, 6 of 6 cycles  Administration: 130.4 mg (5/23/2024), 144.8 mg (5/30/2024), 138.4 mg (6/6/2024), 144.8 mg (6/13/2024), 132 mg (6/21/2024), 143.6 mg (7/2/2024)  PACLItaxel (TAXOL) chemo IVPB, 45 mg/m2 = 67.2 mg (90 % of original dose 50 mg/m2), Intravenous, Once, 6 of 6 cycles  Dose modification: 45 mg/m2 (original dose 50 mg/m2, Cycle 1, Reason: Anticipated Tolerance)  Administration: 67.2 mg (5/23/2024), 67.2 mg (5/30/2024), 67.2 mg (6/6/2024), 67.2 mg (6/13/2024), 67.2 mg (6/21/2024), 67.2 mg (7/2/2024)     5/23/2024 - 7/5/2024 Radiation    Treatment:  Course: C1    Plan ID Energy Fractions Dose per Fraction (cGy) Dose Correction (cGy) Total Dose Delivered (cGy) Elapsed Days   R Lung_Hilum 6X 30 / 30 200 0 6,000 43      Treatment dates:  C1: 5/23/2024 - 7/5/2024 8/26/2024 -  Chemotherapy    cyanocobalamin, 1,000 mcg, Intramuscular, Once, 1 of 3 cycles  Administration: 1,000 mcg (8/19/2024)  alteplase (CATHFLO), 2 mg, Intracatheter, Every 1 Minute as needed, 0 of 6 cycles  fosaprepitant (EMEND) IVPB, 150 mg, Intravenous, Once, 0 of 6 cycles  CARBOplatin (PARAPLATIN) IVPB (GOG AUC DOSING), 525 mg, Intravenous, Once, 0 of 6 cycles  pemetrexed (ALIMTA) chemo infusion, 500 mg/m2 = 735 mg, Intravenous, Once, 0 of 6 cycles  pembrolizumab (KEYTRUDA) IVPB, 200 mg, Intravenous, Once, 0 of 6 cycles       Past Medical History:   Diagnosis Date    Allergic 2022    Allergic to neomiacin and cephalexin    Cataract Nov. 2023    Due to have durgery June 2024    Essential thrombocytosis (HCC)     controlled with medication    Hyperlipidemia     Hypertension     Mass in chest     Nodular goiter     Pneumonia Oct 16, 2023    Also  Feb 2024    Psoriasis      SIRS (systemic inflammatory response syndrome) ([facility]) 2024     Past Surgical History:   Procedure Laterality Date    APPENDECTOMY      BREAST CYST EXCISION Right     COLONOSCOPY  10/31/2018    DXA PROCEDURE (HISTORICAL)  2017    MAMMO (HISTORICAL)      18    MAMMO NEEDLE LOCALIZATION RIGHT (ALL INC) Right 2009    SKIN LESION EXCISION      bridge of nose     Family History   Problem Relation Age of Onset    Stroke Mother     Depression Mother             Hypertension Mother     Hearing loss Mother     Tuberculosis Father     Cancer Brother         lung    Tuberculosis Brother     Coronary artery disease Brother     Hypertension Brother     No Known Problems Daughter     No Known Problems Daughter     No Known Problems Maternal Grandmother     No Known Problems Maternal Grandfather     No Known Problems Paternal Grandmother     No Known Problems Paternal Grandfather     No Known Problems Maternal Aunt     No Known Problems Maternal Aunt     No Known Problems Maternal Aunt     No Known Problems Maternal Aunt     No Known Problems Paternal Aunt     Cancer Brother         Throat cancer     Social History   Social History     Substance and Sexual Activity   Alcohol Use Not Currently     Social History     Substance and Sexual Activity   Drug Use Never     Social History     Tobacco Use   Smoking Status Former    Current packs/day: 1.00    Average packs/day: 1 pack/day for 58.6 years (58.6 ttl pk-yrs)    Types: Cigarettes    Start date:     Passive exposure: Past   Smokeless Tobacco Never   Tobacco Comments    Quit 2010       Meds/Allergies   current meds:   Current Facility-Administered Medications   Medication Dose Route Frequency    dexamethasone (DECADRON) injection 4 mg  4 mg Intravenous Q6H KRISS     Allergies   Allergen Reactions    Doxycycline Headache    Keflex [Cephalexin] Rash    Neomycin-Polymyxin-Dexameth Eye Swelling       Objective   No intake or output data in the 24  hours ending 08/21/24 1227  Invasive Devices       Peripheral Intravenous Line  Duration             Peripheral IV 08/21/24 Right Antecubital <1 day                    Physical Exam  General Appearance:  Alert, cooperative, no distress, appears stated age  HEENT: normocephalic/atraumatic  Cardiovascular:  Extremities warm and well perfused  Lungs: Respirations unlabored, no cyanosis, able to speak in complete sentences without dyspnea.  Abdomen: Non-distended  Extremities: No cyanosis or edema  Skin: No generalized rash or dermatitis  Neurologic: AAOx3, speech and cognition intact. Moving all extremities appropriately.    Lab Results: I have personally reviewed pertinent reports.    Imaging Studies: I have personally reviewed pertinent films in PACS  EKG, Pathology, and Other Studies: I have personally reviewed pertinent reports.          Counseling / Coordination of Care  Total Time Spent  45 minutes spent reviewing EMR in preparation for visit, with the patient, coordination with other providers, and documentation.

## 2024-08-21 NOTE — NURSING NOTE
"RN called to room by patient.   Patient states \"I feel very confused and this is scary to me\" Patient states \"I feel worse than I did this morning\"    Patient was unable to provide the current day, month or year.    Patient provided with reassurance and emotional support.   "

## 2024-08-21 NOTE — ASSESSMENT & PLAN NOTE
Malnutrition Findings:       Secondary to cancer.    Encourage protein supplement.  Nutrition consult.                       BMI Findings:           Body mass index is 20.16 kg/m².

## 2024-08-21 NOTE — ASSESSMENT & PLAN NOTE
Secondary to vasogenic edema from brain metastases.  We will admit the patient to medical surgical as inpatient.  Patient received 1 dose of Decadron 10 mg IV in the ED.  Consult radiation oncology and oncology.  Deferred further dosing of Decadron to oncology team.  Patient's home dose of Decadron is 1 mg p.o. daily.  Continue the same dose.  Patient denies any urinary symptoms or cough or cold or any fevers or chills.  No leukocytosis.  No signs of any infection suspected.

## 2024-08-21 NOTE — ASSESSMENT & PLAN NOTE
Patient is not supposed to be on any hydroxyurea.  Continue folic acid.  Follow-up oncology recommendation.

## 2024-08-21 NOTE — TELEPHONE ENCOUNTER
Received Paperwork   What type of form FMLA   Scanned blank form into patient's Epic chart Yes   Method received form  Mail   Provider responsible for form Dr. Castro   Informed patient our office turn around time for completing patient forms is 5-7 business days. Yes, informed patient of turn around time     Comments Please complete and fax to [phone]  Attn:  Michelle Quinones

## 2024-08-21 NOTE — H&P
[facility]  H&P  Name: Ayla Nye 73 y.o. adult I MRN: [id number]  Unit/Bed#: ED-33 I Date of Admission: 8/21/2024   Date of Service: 8/21/2024 I Hospital Day: 0      Assessment & Plan   * Acute metabolic encephalopathy  Assessment & Plan  Secondary to vasogenic edema from brain metastases.  We will admit the patient to medical surgical as inpatient.  Patient received 1 dose of Decadron 10 mg IV in the ED.  Consult radiation oncology and oncology.  Deferred further dosing of Decadron to oncology team.  Patient's home dose of Decadron is 1 mg p.o. daily.  Continue the same dose.  Patient denies any urinary symptoms or cough or cold or any fevers or chills.  No leukocytosis.  No signs of any infection suspected.    Malignant neoplasm of upper lobe, right bronchus or lung (HCC)  Assessment & Plan  Consult oncology and radiation oncology.  Increase Keppra to 750 mg twice daily. (Of note patient's dose of Keppra was recently decreased on 8/16/2024 by oncologist to 500 twice daily)  Less likely suspicion for any significant activity or episodes of seizures.    Moderate protein-calorie malnutrition (HCC)  Assessment & Plan  Malnutrition Findings:       Secondary to cancer.    Encourage protein supplement.  Nutrition consult.                       BMI Findings:           Body mass index is 20.16 kg/m².       JAK2 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  Assessment & Plan  As mentioned above.    Essential thrombocytosis (HCC)  Assessment & Plan  Patient is not supposed to be on any hydroxyurea.  Continue folic acid.  Follow-up oncology recommendation.             Date of Service:   08/21/24    VTE Prophylaxis: VTE Score: 5 Moderate Risk (Score 3-4) - Pharmacological DVT Prophylaxis Ordered: enoxaparin (Lovenox).  Code Status: Prior  POLST:There is no POLST form on file for this patient (pre-hospital)   Discussion with family:  Updated  (daughter) via phone.    Anticipated Length of Stay:  Patient will  be admitted on an Inpatient basis with an anticipated length of stay of  > 2 midnights.   Justification for Hospital Stay: Acute Metabolic Encephalopathy    Total Time for Visit, including Counseling / Coordination of Care: 45 minutes.  Greater than 50% of this total time spent on direct patient counseling and coordination of care.    Chief Complaint:     Altered Mental Status (Pt reports feeling disoriented, dizzy, and having lack of coordination today. Was on her way to get blood work but asked to be brought here instead. Reports hx of the same and states the causes was related to her cancer. )    History of Present Illness:    Ayla Nye is a 73 y.o. adult with PMHx significant for Metastatic Lung ca with mets to brain, ET with Anand-2 Mutation, who presents with Chief complaints of confusion, imbalance, dizziness over the last 2 to 3 days.  Patient feels that she is having word finding difficulty and inability to express herself appropriately she does notice that she is confused.  On talking to her daughter-daughter Heidy mentioned that the patient yesterday felt like she was wanting to reach her bathtub and could not reach that.  She feels that she is not able to approach or try to reach for things which she wants to and reaches for wrong things.  Denies any tingling numbness or weakness in the upper extremities, no bowel bladder incontinence.  Is aware of what is going on with her.  No facial drooping or swallowing difficulty.  Denies any urinary symptoms.  She was scheduled to get some blood work done and her altered mental status was noticed as well as her imbalance to gait and unsteadiness and hence patient was sent to the ED for further evaluation.    In the ED patient vitals are stable, lab Work did not show any leukocytosis, BMP was within normal limits as well.   CT of the head was done which showed worsening vasogenic edema particularly involving the left parietal occipital lobe surrounding the  patient's known metastatic lesions, as well as vasogenic edema involving the parafalcine left frontal lobe.  Patient was then admitted for Acute encephalopathy 2/2 Metastatic Lesions in brain from Lung Ca Primary.     Review of Systems:  Review of Systems: A thorough 10 point review of System was done which was negative for other than that mentioned in HPI.     Past Medical and Surgical History:     Past Medical History:   Diagnosis Date    Allergic 2022    Allergic to neomiacin and cephalexin    Cataract Nov. 2023    Due to have durgery June 2024    Essential thrombocytosis (HCC)     controlled with medication    Hyperlipidemia     Hypertension     Mass in chest     Nodular goiter     Pneumonia Oct 16, 2023    Also  Feb 2024    Psoriasis     SIRS (systemic inflammatory response syndrome) (HCC) 06/22/2024       Past Surgical History:   Procedure Laterality Date    APPENDECTOMY      BREAST CYST EXCISION Right 2009    COLONOSCOPY  10/31/2018    DXA PROCEDURE (HISTORICAL)  04/21/2017    MAMMO (HISTORICAL)      8-24-18    MAMMO NEEDLE LOCALIZATION RIGHT (ALL INC) Right 07/13/2009    SKIN LESION EXCISION      bridge of nose       Meds/Allergies:  Prior to Admission medications    Medication Sig Start Date End Date Taking? Authorizing Provider   aspirin 81 mg chewable tablet Chew 81 mg daily. 8/1/19   Historical Provider, MD   atorvastatin (LIPITOR) 40 mg tablet Take 1 tablet (40 mg total) by mouth every evening 8/1/24 8/31/24  Damaso Silva MD   Cholecalciferol (VITAMIN D3) 5000 units TABS Take 5,000 Units by mouth Daily    Historical Provider, MD   dexamethasone (DECADRON) 1 mg tablet Take 4 tablets (4 mg total) by mouth daily with breakfast for 5 days, THEN 2 tablets (2 mg total) daily with breakfast for 5 days, THEN 1 tablet (1 mg total) daily with breakfast for 5 days. 8/8/24 8/23/24  Gregory Neff MD   dexamethasone (DECADRON) 4 mg tablet Take 1 tablet (4 mg total) by mouth daily 8/1/24 8/31/24  Damaso BELTRE  MD Ricardo   diphenhydrAMINE (BENADRYL) 25 mg capsule Take 25 mg by mouth every 12 (twelve) hours as needed for itching    Historical Provider, MD   folic acid (FOLVITE) 1 mg tablet Take 1 tablet (1 mg total) by mouth daily 8/17/24   Rosalinda Castro MD   levETIRAcetam (Keppra) 500 mg tablet Take 1 tablet (500 mg total) by mouth every 12 (twelve) hours 8/16/24 1/13/25  Rosalinda Castro MD   levETIRAcetam (Keppra) 750 mg tablet Take 1 tablet (750 mg total) by mouth 2 (two) times a day 8/1/24 8/31/24  Damaso Silva MD   pantoprazole (PROTONIX) 40 mg tablet Take 1 tablet (40 mg total) by mouth daily in the early morning 8/1/24 8/31/24  Damaso Silva MD   Probiotic Product (PROBIOTIC DAILY PO) Take 1 capsule by mouth daily    Historical Provider, MD   sulfamethoxazole-trimethoprim (BACTRIM DS) 800-160 mg per tablet Take 1 tablet by mouth every 12 (twelve) hours 8/1/24 8/31/24  Damaso Silva MD   vitamin B-12 (VITAMIN B-12) 1,000 mcg tablet Take 1 tablet (1,000 mcg total) by mouth daily 9/14/23   ZULEYMA Boland     I have reviewed home medications with patient personally.    Allergies:   Allergies   Allergen Reactions    Doxycycline Headache    Keflex [Cephalexin] Rash    Neomycin-Polymyxin-Dexameth Eye Swelling       Social History:     Marital Status:    Occupation: Reviewed and Non Contributory   Patient Pre-hospital Living Situation: Home   Patient Pre-hospital Level of Mobility: Walks   Patient Pre-hospital Diet Restrictions: None     Substance Use History:   Social History     Substance and Sexual Activity   Alcohol Use Not Currently     Social History     Tobacco Use   Smoking Status Former    Current packs/day: 1.00    Average packs/day: 1 pack/day for 58.6 years (58.6 ttl pk-yrs)    Types: Cigarettes    Start date: 1966    Passive exposure: Past   Smokeless Tobacco Never   Tobacco Comments    Quit 2010     Social History     Substance and Sexual Activity   Drug Use Never       Family  History:    Family History   Problem Relation Age of Onset    Stroke Mother     Depression Mother             Hypertension Mother     Hearing loss Mother     Tuberculosis Father     Cancer Brother         lung    Tuberculosis Brother     Coronary artery disease Brother     Hypertension Brother     No Known Problems Daughter     No Known Problems Daughter     No Known Problems Maternal Grandmother     No Known Problems Maternal Grandfather     No Known Problems Paternal Grandmother     No Known Problems Paternal Grandfather     No Known Problems Maternal Aunt     No Known Problems Maternal Aunt     No Known Problems Maternal Aunt     No Known Problems Maternal Aunt     No Known Problems Paternal Aunt     Cancer Brother         Throat cancer       Physical Exam:     Vitals:   Blood Pressure: 164/73 (24 0945)  Pulse: 59 (24 0945)  Temperature: 97.5 °F (36.4 °C) (24 0750)  Temp Source: Oral (24 075)  Respirations: 20 (24 0945)  Weight - Scale: 50 kg (110 lb 3.7 oz) (24 0749)  SpO2: 100 % (24 0945)    Physical Exam  General Exam: Alert and Oriented x 2, NAD, anxious and confused.  Eyes: CORINA  Neck: Supple.   CVS: S1, S2 Chaves, RRR.   R/S: Clear to auscultate anteriorly.   Abd: Soft, NT, ND, BS+ve  Extremities: No edema noted.   Skin: No acute Rash noted.   CNS: Patient's balance is unsteady, unable to stand steady on her feet, and confused.  However cranial nerves II to XII grossly intact, strength 5 x 5 in bilateral upper and lower extremity.  Psych: Anxious      Lab Results: I have personally reviewed pertinent reports.    Results from last 7 days   Lab Units 24  0817   WBC Thousand/uL 7.38   HEMOGLOBIN g/dL 10.7*   HEMATOCRIT % 32.6*   PLATELETS Thousands/uL 172     Results from last 7 days   Lab Units 24  0817   POTASSIUM mmol/L 4.2   CHLORIDE mmol/L 103   CO2 mmol/L 25   BUN mg/dL 22   CREATININE mg/dL 1.24   CALCIUM mg/dL 8.9   ALK PHOS U/L 39   ALT  "U/L 8   AST U/L 14         Results from last 7 days   Lab Units 08/21/24  0948   POC GLUCOSE mg/dl 83           Imaging: I have personally reviewed pertinent reports.      CT head without contrast   Final Result by Yobani Medrano MD (08/21 0953)         1. Worsening vasogenic edema in particular involving the left parietal and occipital lobes, corresponding to worsening vasogenic edema surrounding the patient's known left occipital lobe metastasis as better depicted on prior brain MRI. This results in    mass effect on the posterior body and trigone of left lateral ventricle..   2. Worsening vasogenic edema involving the parafalcine left frontal lobe at the site of the patient's known intracranial metastasis in this region as seen on prior brain MRI.   3. No intracranial hemorrhage noted. No midline shift.         The study was marked in EPIC for immediate notification.                        Workstation performed: [id number]             CT head without contrast   Final Result         1. Worsening vasogenic edema in particular involving the left parietal and occipital lobes, corresponding to worsening vasogenic edema surrounding the patient's known left occipital lobe metastasis as better depicted on prior brain MRI. This results in    mass effect on the posterior body and trigone of left lateral ventricle..   2. Worsening vasogenic edema involving the parafalcine left frontal lobe at the site of the patient's known intracranial metastasis in this region as seen on prior brain MRI.   3. No intracranial hemorrhage noted. No midline shift.         The study was marked in EPIC for immediate notification.                        Workstation performed: [id number]             EKG, Imaging, and Other Studies Reviewed on Admission:   CT Head reviewed.     Discussed the case with ED Provider  Discussed the case with Radiation Oncology and Oncology.   Allscri\[facility] [place] [facility]\""/EPIC Records Reviewed: Yes     ** Please Note: \"This note has been " "constructed using a voice recognition system.Therefore there may be syntax, spelling, and/or grammatical errors. Please call if you have any questions. \"**    "

## 2024-08-21 NOTE — ASSESSMENT & PLAN NOTE
Consult oncology and radiation oncology.  Increase Keppra to 750 mg twice daily. (Of note patient's dose of Keppra was recently decreased on 8/16/2024 by oncologist to 500 twice daily)  Less likely suspicion for any significant activity or episodes of seizures.

## 2024-08-21 NOTE — CASE MANAGEMENT
Case Management Assessment & Discharge Planning Note    Patient name Ayla Nye  Location S /S -01 MRN [id number]  : 1950 Date 2024       Current Admission Date: 2024  Current Admission Diagnosis:Acute metabolic encephalopathy   Patient Active Problem List    Diagnosis Date Noted Date Diagnosed    Acute metabolic encephalopathy 2024     Malignant neoplasm metastatic to brain (HCC) 2024     Brain mass 2024     Metastatic cancer to brain (HCC) 2024     Visual disturbance 2024     SIRS (systemic inflammatory response syndrome) (HCC) 2024     Dehydration 2024     Moderate protein-calorie malnutrition (HCC) 2024     Bilateral leg pain 2024     Insomnia 2024     Malignant neoplasm of upper lobe, right bronchus or lung (HCC) 2024     Age-related osteoporosis without current pathological fracture 2023     Encounter for monitoring of hydroxyurea therapy 2023     JAK2 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 2023     Hypertension 08/10/2022     Mixed hyperlipidemia 08/10/2022     Essential thrombocytosis (HCC) 2020     TB lung, 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     Psoriatic arthritis (HCC) 09/10/2019       LOS (days): 0  Geometric Mean LOS (GMLOS) (days): 4.9  Days to GMLOS:4.7     OBJECTIVE:  PATIENT READMITTED TO HOSPITAL  Risk of Unplanned Readmission Score: 19.45         Current admission status: Inpatient       [facility] Pharm[facility]:   [facility] [place], [place] - [address] [id number] [facility] [address]  Phone: [phone] Fax: [phone]    Primary Care Provider: Rafy Angelo MD    Primary Insurance: MEDICARE  Secondary Insurance: AARP    ASSESSMENT:  Active Health Care Proxies    There are no active Health Care Proxies on file.                 Readmission Root Cause  30 Day Readmission: No    Patient Information  Admitted from:: Home  Mental Status: Alert  During  Assessment patient was accompanied by: Other-Comment  Assessment information provided by:: Patient  Primary Caregiver: Self  Support Systems: Self, Daughter, Spouse/significant other  County of Residence: [place]  What city do you live in?: [place]  Home entry access options. Select all that apply.: Stairs  Number of steps to enter home.: 5  Do the steps have railings?: Yes  Type of Current Residence: 2 story home  Upon entering residence, is there a bedroom on the main floor (no further steps)?: No  A bedroom is located on the following floor levels of residence (select all that apply):: 2nd Floor  Upon entering residence, is there a bathroom on the main floor (no further steps)?: Yes  Number of steps to 2nd floor from main floor: One Flight  Living Arrangements: Lives Alone  Is patient a ?: No    Activities of Daily Living Prior to Admission  Functional Status: Independent  Completes ADLs independently?: Yes  Ambulates independently?: Yes  Does patient use assisted devices?: No  Does patient currently own DME?: No  Does patient have a history of Outpatient Therapy (PT/OT)?: No  Does the patient have a history of Short-Term Rehab?: No  Does patient have a history of HHC?: No  Does patient currently have HHC?: No         Patient Information Continued  Does patient have prescription coverage?: Yes  Does patient receive dialysis treatments?: No  Does patient have a history of substance abuse?: No  Does patient have a history of Mental Health Diagnosis?: No         Means of Transportation  Means of Transport to Appts:: Family transport      Social Determinants of Health (SDOH)      Flowsheet Row Most Recent Value   Housing Stability    In the last 12 months, was there a time when you were not able to pay the mortgage or rent on time? N   At any time in the past 12 months, were you homeless or living in a shelter (including now)? N   Transportation Needs    In the past 12 months, has lack of transportation  kept you from medical appointments or from getting medications? no   In the past 12 months, has lack of transportation kept you from meetings, work, or from getting things needed for daily living? No   Food Insecurity    Within the past 12 months, you worried that your food would run out before you got the money to buy more. Never true   Within the past 12 months, the food you bought just didn't last and you didn't have money to get more. Never true   Utilities    In the past 12 months has the electric, gas, oil, or water company threatened to shut off services in your home? No            DISCHARGE DETAILS:    Discharge planning discussed with:: Patient  Freedom of Choice: Yes  Comments - Freedom of Choice: Cm met with patient to review Cm role. Patient does not anticipate any needs upon discharge.     Were Treatment Team discharge recommendations reviewed with patient/caregiver?: Yes  Did patient/caregiver verbalize understanding of patient care needs?: Yes  Were patient/caregiver advised of the risks associated with not following Treatment Team discharge recommendations?: Yes    Contacts  Reason/Outcome: Discharge Planning, Referral, Continuity of Care    Requested Home Health Care         Is the patient interested in HHC at discharge?: No    DME Referral Provided  Referral made for DME?: No

## 2024-08-21 NOTE — ED PROVIDER NOTES
History  Chief Complaint   Patient presents with    Altered Mental Status     Pt reports feeling disoriented, dizzy, and having lack of coordination today. Was on her way to get blood work but asked to be brought here instead. Reports hx of the same and states the causes was related to her cancer.      (Ayla Nye) Ayla Nye is a 73 y.o. adult     They presented to the emergency department on August 21, 2024. Patient presents with:  Altered Mental Status: Pt reports feeling disoriented, dizzy, and having lack of coordination today. Was on her way to get blood work but asked to be brought here instead. Reports hx of the same and states the causes was related to her cancer.   .    The patient states that she has a history of metastatic lung cancer to her brain, was going to have blood work today in preparation for beginning immunotherapy at which point she noted that she began feeling disoriented, as well as having difficulty with performing simple tasks.  Patient notes that she has had episodes similar to this in the past and been evaluated previously.  Patient states that her current symptoms are very similar to previous however due to the acute change came to the patient's department for evaluation.  Patient does note that she has a headache, that is similar to previous, but notes that she has had some difficulty with her peripheral vision. Patient denies fever, chills, chest pain, difficulty breathing, abdominal pain, change in bowel habits, change in urination, or any other complaint at this time.              Prior to Admission Medications   Prescriptions Last Dose Informant Patient Reported? Taking?   Cholecalciferol (VITAMIN D3) 5000 units TABS  Self Yes No   Sig: Take 5,000 Units by mouth Daily   Probiotic Product (PROBIOTIC DAILY PO)  Self Yes No   Sig: Take 1 capsule by mouth daily   aspirin 81 mg chewable tablet   Yes No   Sig: Chew 81 mg daily.   atorvastatin (LIPITOR) 40 mg tablet   No No    Sig: Take 1 tablet (40 mg total) by mouth every evening   dexamethasone (DECADRON) 1 mg tablet   No No   Sig: Take 4 tablets (4 mg total) by mouth daily with breakfast for 5 days, THEN 2 tablets (2 mg total) daily with breakfast for 5 days, THEN 1 tablet (1 mg total) daily with breakfast for 5 days.   dexamethasone (DECADRON) 4 mg tablet   No No   Sig: Take 1 tablet (4 mg total) by mouth daily   diphenhydrAMINE (BENADRYL) 25 mg capsule  Self Yes No   Sig: Take 25 mg by mouth every 12 (twelve) hours as needed for itching   folic acid (FOLVITE) 1 mg tablet   No No   Sig: Take 1 tablet (1 mg total) by mouth daily   levETIRAcetam (Keppra) 500 mg tablet   No No   Sig: Take 1 tablet (500 mg total) by mouth every 12 (twelve) hours   levETIRAcetam (Keppra) 750 mg tablet   No No   Sig: Take 1 tablet (750 mg total) by mouth 2 (two) times a day   pantoprazole (PROTONIX) 40 mg tablet   No No   Sig: Take 1 tablet (40 mg total) by mouth daily in the early morning   sulfamethoxazole-trimethoprim (BACTRIM DS) 800-160 mg per tablet   No No   Sig: Take 1 tablet by mouth every 12 (twelve) hours   vitamin B-12 (VITAMIN B-12) 1,000 mcg tablet  Self No No   Sig: Take 1 tablet (1,000 mcg total) by mouth daily      Facility-Administered Medications: None       Past Medical History:   Diagnosis Date    Allergic 2022    Allergic to neomiacin and cephalexin    Cataract Nov. 2023    Due to have durgery June 2024    Essential thrombocytosis (HCC)     controlled with medication    Hyperlipidemia     Hypertension     Mass in chest     Nodular goiter     Pneumonia Oct 16, 2023    Also  Feb 2024    Psoriasis     SIRS (systemic inflammatory response syndrome) (HCC) 06/22/2024       Past Surgical History:   Procedure Laterality Date    APPENDECTOMY      BREAST CYST EXCISION Right 2009    COLONOSCOPY  10/31/2018    DXA PROCEDURE (HISTORICAL)  04/21/2017    MAMMO (HISTORICAL)      8-24-18    MAMMO NEEDLE LOCALIZATION RIGHT (ALL INC) Right 07/13/2009     SKIN LESION EXCISION      bridge of nose       Family History   Problem Relation Age of Onset    Stroke Mother     Depression Mother             Hypertension Mother     Hearing loss Mother     Tuberculosis Father     Cancer Brother         lung    Tuberculosis Brother     Coronary artery disease Brother     Hypertension Brother     No Known Problems Daughter     No Known Problems Daughter     No Known Problems Maternal Grandmother     No Known Problems Maternal Grandfather     No Known Problems Paternal Grandmother     No Known Problems Paternal Grandfather     No Known Problems Maternal Aunt     No Known Problems Maternal Aunt     No Known Problems Maternal Aunt     No Known Problems Maternal Aunt     No Known Problems Paternal Aunt     Cancer Brother         Throat cancer     I have reviewed and agree with the history as documented.    E-Cigarette/Vaping    E-Cigarette Use Never User      E-Cigarette/Vaping Substances    Nicotine No     THC No     CBD No     Flavoring No     Other No     Unknown No      Social History     Tobacco Use    Smoking status: Former     Current packs/day: 1.00     Average packs/day: 1 pack/day for 58.6 years (58.6 ttl pk-yrs)     Types: Cigarettes     Start date:      Passive exposure: Past    Smokeless tobacco: Never    Tobacco comments:     Quit    Vaping Use    Vaping status: Never Used   Substance Use Topics    Alcohol use: Not Currently    Drug use: Never        Review of Systems   Constitutional:  Negative for chills and fever.   HENT:  Negative for ear pain and sore throat.    Eyes:  Positive for visual disturbance (Peripheral vision decrease). Negative for pain.   Respiratory:  Negative for cough and shortness of breath.    Cardiovascular:  Negative for chest pain and palpitations.   Gastrointestinal:  Negative for abdominal pain and vomiting.   Genitourinary:  Negative for dysuria and hematuria.   Musculoskeletal:  Negative for arthralgias and back pain.    Skin:  Negative for color change and rash.   Neurological:  Positive for headaches. Negative for seizures and syncope.   Psychiatric/Behavioral:  Positive for confusion.    All other systems reviewed and are negative.      Physical Exam  ED Triage Vitals   Temperature Pulse Respirations Blood Pressure SpO2   08/21/24 0750 08/21/24 0749 08/21/24 0749 08/21/24 0749 08/21/24 0749   97.5 °F (36.4 °C) 68 18 (!) 185/79 100 %      Temp Source Heart Rate Source Patient Position - Orthostatic VS BP Location FiO2 (%)   08/21/24 0750 08/21/24 0749 08/21/24 0749 08/21/24 0749 --   Oral Monitor Lying Right arm       Pain Score       08/21/24 0825       6             Orthostatic Vital Signs  Vitals:    08/21/24 0749 08/21/24 0830 08/21/24 0945   BP: (!) 185/79 160/84 164/73   Pulse: 68 63 59   Patient Position - Orthostatic VS: Lying Sitting        Physical Exam  Vitals and nursing note reviewed.   Constitutional:       General: She is not in acute distress.     Appearance: Normal appearance.   HENT:      Head: Normocephalic and atraumatic.      Right Ear: External ear normal.      Left Ear: External ear normal.      Nose: Nose normal.      Mouth/Throat:      Mouth: Mucous membranes are moist.   Eyes:      Conjunctiva/sclera: Conjunctivae normal.   Cardiovascular:      Rate and Rhythm: Normal rate and regular rhythm.   Pulmonary:      Effort: Pulmonary effort is normal. No respiratory distress.      Breath sounds: Normal breath sounds.   Abdominal:      General: Abdomen is flat. Bowel sounds are normal.      Tenderness: There is no abdominal tenderness. There is no guarding or rebound.   Musculoskeletal:         General: Normal range of motion.      Cervical back: Normal range of motion.   Skin:     General: Skin is warm and dry.      Capillary Refill: Capillary refill takes less than 2 seconds.   Neurological:      Mental Status: She is alert.      GCS: GCS eye subscore is 4. GCS verbal subscore is 4. GCS motor subscore is 5.       Coordination: Finger-Nose-Finger Test abnormal (Of right upper extremity).      Comments: Oriented to self and place   Psychiatric:         Mood and Affect: Mood normal.         ED Medications  Medications   dexamethasone (PF) (DECADRON) injection 10 mg (has no administration in time range)   acetaminophen (TYLENOL) tablet 975 mg (975 mg Oral Given 8/21/24 0839)       Diagnostic Studies  Results Reviewed       Procedure Component Value Units Date/Time    RBC Morphology Reflex Test [[id number]] Collected: 08/21/24 0817    Lab Status: Final result Specimen: Blood from Arm, Right Updated: 08/21/24 1001    Fingerstick Glucose (POCT) [[id number]]  (Normal) Collected: 08/21/24 0948    Lab Status: Final result Specimen: Blood Updated: 08/21/24 0949     POC Glucose 83 mg/dl     CBC and differential [[id number]]  (Abnormal) Collected: 08/21/24 0817    Lab Status: Final result Specimen: Blood from Arm, Right Updated: 08/21/24 0920     WBC 7.38 Thousand/uL      RBC 3.26 Million/uL      Hemoglobin 10.7 g/dL      Hematocrit 32.6 %       fL      MCH 32.8 pg      MCHC 32.8 g/dL      RDW 14.8 %      MPV 8.7 fL      Platelets 172 Thousands/uL     Narrative:      This is an appended report.  These results have been appended to a previously verified report.    Manual Differential(PHLEBS Do Not Order) [[id number]]  (Abnormal) Collected: 08/21/24 0817    Lab Status: Final result Specimen: Blood from Arm, Right Updated: 08/21/24 0920     Segmented % 87 %      Bands % 2 %      Lymphocytes % 7 %      Monocytes % 3 %      Eosinophils % 0 %      Basophils % 1 %      Absolute Neutrophils 6.57 Thousand/uL      Absolute Lymphocytes 0.52 Thousand/uL      Absolute Monocytes 0.22 Thousand/uL      Absolute Eosinophils 0.00 Thousand/uL      Absolute Basophils 0.07 Thousand/uL      Total Counted --     RBC Morphology Normal     Platelet Estimate Adequate    Comprehensive metabolic panel [[id number]]  (Abnormal) Collected: 08/21/24 0817    Lab  Status: Final result Specimen: Blood from Arm, Right Updated: 08/21/24 0842     Sodium 134 mmol/L      Potassium 4.2 mmol/L      Chloride 103 mmol/L      CO2 25 mmol/L      ANION GAP 6 mmol/L      BUN 22 mg/dL      Creatinine 1.24 mg/dL      Glucose 90 mg/dL      Calcium 8.9 mg/dL      AST 14 U/L      ALT 8 U/L      Alkaline Phosphatase 39 U/L      Total Protein 6.4 g/dL      Albumin 4.0 g/dL      Total Bilirubin 0.24 mg/dL      eGFR --    Narrative:      Notes:     1. eGFR calculation is only valid for adults 18 years and older.  2. EGFR calculation cannot be performed for patients who are transgender, non-binary, or whose legal sex, sex at birth, and gender identity differ.    Magnesium [[id number]]  (Normal) Collected: 08/21/24 0817    Lab Status: Final result Specimen: Blood from Arm, Right Updated: 08/21/24 0842     Magnesium 1.9 mg/dL                    CT head without contrast   Final Result by Yobani Medrano MD (08/21 0953)         1. Worsening vasogenic edema in particular involving the left parietal and occipital lobes, corresponding to worsening vasogenic edema surrounding the patient's known left occipital lobe metastasis as better depicted on prior brain MRI. This results in    mass effect on the posterior body and trigone of left lateral ventricle..   2. Worsening vasogenic edema involving the parafalcine left frontal lobe at the site of the patient's known intracranial metastasis in this region as seen on prior brain MRI.   3. No intracranial hemorrhage noted. No midline shift.         The study was marked in EPIC for immediate notification.                        Workstation performed: [id number]               Procedures  ECG 12 Lead Documentation Only    Date/Time: 8/21/2024 8:30 AM    Performed by: Diego Bhakta MD  Authorized by: Diego Bhakta MD    ECG reviewed by me, the ED Provider: yes    Patient location:  ED  Previous ECG:     Previous ECG:  Compared to current    Similarity:  No  change  Interpretation:     Interpretation: normal    Rate:     ECG rate:  62    ECG rate assessment: normal    Rhythm:     Rhythm: sinus rhythm    Ectopy:     Ectopy: none    QRS:     QRS axis:  Normal    QRS intervals:  Normal  Conduction:     Conduction: normal    ST segments:     ST segments:  Normal  T waves:     T waves: normal    Comments:      Normal Sinus rhythm at 62 BPM, no PAC, no PVC, no acute ischemic changes.        ED Course                             SBIRT 20yo+      Flowsheet Row Most Recent Value   Initial Alcohol Screen: US AUDIT-C     1. How often do you have a drink containing alcohol? 0 Filed at: 08/21/2024 0841   2. How many drinks containing alcohol do you have on a typical day you are drinking?  0 Filed at: 08/21/2024 0841   3a. Male UNDER 65: How often do you have five or more drinks on one occasion? 0 Filed at: 08/21/2024 0841   3b. FEMALE Any Age, or MALE 65+: How often do you have 4 or more drinks on one occassion? 0 Filed at: 08/21/2024 0841   Audit-C Score 0 Filed at: 08/21/2024 0841   ALEXIS: How many times in the past year have you...    Used an illegal drug or used a prescription medication for non-medical reasons? Never Filed at: 08/21/2024 0841                  Medical Decision Making  73-year-old female with past medical history of metastatic lung cancer to her brain presents the emergency department with chief complaint of confusion.  Patient seen and examined noted to have dysmetria mainly focused in the right upper extremity as well as disorientation to date.  Per review of patient's previous chart, noted dysmetria has not worsened.  Will obtain basic lab work as well as radiographic imaging of the head.  Laboratory findings showing no acute pertinent findings, CT however notable for worsening vasogenic edema and areas of metastasis.  Decadron ordered.  Discussed with internal medicine service who accepted the patient under Dr. Palma service for further evaluation and  management.    Amount and/or Complexity of Data Reviewed  Labs: ordered.  Radiology: ordered.    Risk  OTC drugs.  Prescription drug management.  Decision regarding hospitalization.          Disposition  Final diagnoses:   Vasogenic brain edema (HCC)     Time reflects when diagnosis was documented in both MDM as applicable and the Disposition within this note       Time User Action Codes Description Comment    8/21/2024 10:27 AM Diego Bhakta [G93.6] Vasogenic brain edema (HCC)     8/21/2024 10:37 AM Lalan, Vikin Add [C79.31] Malignant neoplasm metastatic to brain (HCC)     8/21/2024 10:37 AM Lalan, Vikin Remove [C79.31] Malignant neoplasm metastatic to brain (HCC)     8/21/2024 10:37 AM Lalan, Vikin Add [C79.31] Malignant neoplasm metastatic to brain (HCC)     8/21/2024 10:37 AM Lalan, Vikin Add [C34.11] Malignant neoplasm of upper lobe, right bronchus or lung (HCC)           ED Disposition       ED Disposition   Admit    Condition   Stable    Date/Time   Wed Aug 21, 2024 1027    Comment   Case was discussed with Dr. Palma and the patient's admission status was agreed to be Admission Status: inpatient status to the service of Dr. Palma.               Follow-up Information    None         Patient's Medications   Discharge Prescriptions    No medications on file     No discharge procedures on file.    PDMP Review         Value Time User    PDMP Reviewed  Yes 7/4/2024  4:14 PM Lili Mccoy MD             ED Provider  Attending physically available and evaluated Ayla Nye. I managed the patient along with the ED Attending.    Electronically Signed by           Diego Bhakta MD  08/21/24 8367

## 2024-08-21 NOTE — PLAN OF CARE
Problem: Potential for Falls  Goal: Patient will remain free of falls  Description: INTERVENTIONS:  - Educate patient/family on patient safety including physical limitations  - Instruct patient to call for assistance with activity   - Consult OT/PT to assist with strengthening/mobility   - Keep Call bell within reach  - Keep bed low and locked with side rails adjusted as appropriate  - Keep care items and personal belongings within reach  - Initiate and maintain comfort rounds  - Make Fall Risk Sign visible to staff  - Offer Toileting every  Hours, in advance of need  - Initiate/Maintain alarm  - Obtain necessary fall risk management equipment:   - Apply yellow socks and bracelet for high fall risk patients  - Consider moving patient to room near nurses station  Outcome: Progressing     Problem: SAFETY ADULT  Goal: Patient will remain free of falls  Description: INTERVENTIONS:  - Educate patient/family on patient safety including physical limitations  - Instruct patient to call for assistance with activity   - Consult OT/PT to assist with strengthening/mobility   - Keep Call bell within reach  - Keep bed low and locked with side rails adjusted as appropriate  - Keep care items and personal belongings within reach  - Initiate and maintain comfort rounds  - Make Fall Risk Sign visible to staff  - Offer Toileting every  Hours, in advance of need  - Initiate/Maintain alarm  - Obtain necessary fall risk management equipment:  - Apply yellow socks and bracelet for high fall risk patients  - Consider moving patient to room near nurses station  Outcome: Progressing     Problem: DISCHARGE PLANNING  Goal: Discharge to home or other facility with appropriate resources  Description: INTERVENTIONS:  - Identify barriers to discharge w/patient and caregiver  - Arrange for needed discharge resources and transportation as appropriate  - Identify discharge learning needs (meds, wound care, etc.)  - Arrange for interpretive services  to assist at discharge as needed  - Refer to Case Management Department for coordinating discharge planning if the patient needs post-hospital services based on physician/advanced practitioner order or complex needs related to functional status, cognitive ability, or social support system  Outcome: Progressing     Problem: Knowledge Deficit  Goal: Patient/family/caregiver demonstrates understanding of disease process, treatment plan, medications, and discharge instructions  Description: Complete learning assessment and assess knowledge base.  Interventions:  - Provide teaching at level of understanding  - Provide teaching via preferred learning methods  Outcome: Progressing     Problem: NEUROSENSORY - ADULT  Goal: Achieves stable or improved neurological status  Description: INTERVENTIONS  - Monitor and report changes in neurological status  - Monitor vital signs such as temperature, blood pressure, glucose, and any other labs ordered   - Initiate measures to prevent increased intracranial pressure  - Monitor for seizure activity and implement precautions if appropriate      Outcome: Progressing     Problem: MUSCULOSKELETAL - ADULT  Goal: Maintain or return mobility to safest level of function  Description: INTERVENTIONS:  - Assess patient's ability to carry out ADLs; assess patient's baseline for ADL function and identify physical deficits which impact ability to perform ADLs (bathing, care of mouth/teeth, toileting, grooming, dressing, etc.)  - Assess/evaluate cause of self-care deficits   - Assess range of motion  - Assess patient's mobility  - Assess patient's need for assistive devices and provide as appropriate  - Encourage maximum independence but intervene and supervise when necessary  - Involve family in performance of ADLs  - Assess for home care needs following discharge   - Consider OT consult to assist with ADL evaluation and planning for discharge  - Provide patient education as appropriate  Outcome:  Progressing

## 2024-08-21 NOTE — ED ATTENDING ATTESTATION
"8/21/2024  I, Maynor Root DO, saw and evaluated the patient. I have discussed the patient with the resident/non-physician practitioner and agree with the resident's/non-physician practitioner's findings, Plan of Care, and MDM as documented in the resident's/non-physician practitioner's note, except where noted. All available labs and Radiology studies were reviewed.  I was present for key portions of any procedure(s) performed by the resident/non-physician practitioner and I was immediately available to provide assistance.       At this point I agree with the current assessment done in the Emergency Department.  I have conducted an independent evaluation of this patient a history and physical is as follows: 73-year-old female, past medical history per resident note, presenting to the emergency department with increased generalized mild confusion stating that it is \"taking me more time to gather myself\" over the last 24 to 48 hours as well as worsening baseline ataxia in the right upper extremity.  Patient [place] fever, chills, change in medications, change in bowel or bladder, chest pain, shortness of breath.    Vital sign stable.  Patient resting comfortably.  Not ill-appearing.AAOX4. CN intact. Gross vision intact all 4 quadrants. Cerebellar signs with finger to nose and heel to shin intact left upper extremity and bilateral lower extremities.  Variable participation with finger-nose on the right upper extremity.  All extremities 5/5 strength. Gross sensation appreciated face and extremities.     73-year-old female presenting to the emergency department with signs and symptoms secondary to worsening vasogenic edema.  Mild deficit noted is chronic in nature and does correlate with the area noted to be subjected to the vasogenic edema.  Decadron provided here.  Labs otherwise without acute pathology.  This is likely occurring in the setting of the attempt to decrease steroid to the patient.  [facility] " accepted her care.       ED Course         Critical Care Time  Procedures

## 2024-08-22 LAB
ANION GAP SERPL CALCULATED.3IONS-SCNC: 9 MMOL/L (ref 4–13)
BASOPHILS # BLD MANUAL: 0 THOUSAND/UL (ref 0–0.1)
BASOPHILS NFR MAR MANUAL: 0 % (ref 0–1)
BUN SERPL-MCNC: 23 MG/DL (ref 5–25)
CALCIUM SERPL-MCNC: 9.5 MG/DL (ref 8.4–10.2)
CHLORIDE SERPL-SCNC: 104 MMOL/L (ref 96–108)
CO2 SERPL-SCNC: 24 MMOL/L (ref 21–32)
CREAT SERPL-MCNC: 1.07 MG/DL (ref 0.6–1.3)
EOSINOPHIL # BLD MANUAL: 0 THOUSAND/UL (ref 0–0.4)
EOSINOPHIL NFR BLD MANUAL: 0 % (ref 0–6)
ERYTHROCYTE [DISTWIDTH] IN BLOOD BY AUTOMATED COUNT: 14.7 % (ref 11.6–15.1)
GLUCOSE SERPL-MCNC: 127 MG/DL (ref 65–140)
HCT VFR BLD AUTO: 33.4 % (ref 36.5–46.1)
HGB BLD-MCNC: 10.8 G/DL (ref 12–15.4)
LYMPHOCYTES # BLD AUTO: 0.37 THOUSAND/UL (ref 0.6–4.47)
LYMPHOCYTES # BLD AUTO: 6 % (ref 14–44)
MCH RBC QN AUTO: 32.4 PG (ref 26.8–34.3)
MCHC RBC AUTO-ENTMCNC: 32.3 G/DL (ref 31.4–37.4)
MCV RBC AUTO: 100 FL (ref 82–98)
METAMYELOCYTE ABSOLUTE CT: 0.06 THOUSAND/UL (ref 0–0.1)
METAMYELOCYTES NFR BLD MANUAL: 1 % (ref 0–1)
MONOCYTES # BLD AUTO: 0.18 THOUSAND/UL (ref 0–1.22)
MONOCYTES NFR BLD: 3 % (ref 4–12)
NEUTROPHILS # BLD MANUAL: 5.54 THOUSAND/UL (ref 1.85–7.62)
NEUTS SEG NFR BLD AUTO: 90 % (ref 43–75)
PLATELET # BLD AUTO: 224 THOUSANDS/UL (ref 149–390)
PLATELET BLD QL SMEAR: ADEQUATE
PMV BLD AUTO: 9 FL (ref 8.9–12.7)
POTASSIUM SERPL-SCNC: 4.6 MMOL/L (ref 3.5–5.3)
RBC # BLD AUTO: 3.33 MILLION/UL (ref 3.88–5.12)
RBC MORPH BLD: NORMAL
SODIUM SERPL-SCNC: 137 MMOL/L (ref 135–147)
WBC # BLD AUTO: 6.16 THOUSAND/UL (ref 4.31–10.16)

## 2024-08-22 PROCEDURE — 80048 BASIC METABOLIC PNL TOTAL CA: CPT | Performed by: INTERNAL MEDICINE

## 2024-08-22 PROCEDURE — 85007 BL SMEAR W/DIFF WBC COUNT: CPT | Performed by: INTERNAL MEDICINE

## 2024-08-22 PROCEDURE — 85027 COMPLETE CBC AUTOMATED: CPT | Performed by: INTERNAL MEDICINE

## 2024-08-22 PROCEDURE — 99232 SBSQ HOSP IP/OBS MODERATE 35: CPT | Performed by: INTERNAL MEDICINE

## 2024-08-22 PROCEDURE — 97163 PT EVAL HIGH COMPLEX 45 MIN: CPT

## 2024-08-22 RX ORDER — DEXAMETHASONE SODIUM PHOSPHATE 4 MG/ML
4 INJECTION, SOLUTION INTRA-ARTICULAR; INTRALESIONAL; INTRAMUSCULAR; INTRAVENOUS; SOFT TISSUE EVERY 12 HOURS SCHEDULED
Status: DISCONTINUED | OUTPATIENT
Start: 2024-08-22 | End: 2024-08-22

## 2024-08-22 RX ORDER — LANOLIN ALCOHOL/MO/W.PET/CERES
3 CREAM (GRAM) TOPICAL
Status: DISCONTINUED | OUTPATIENT
Start: 2024-08-22 | End: 2024-08-23 | Stop reason: HOSPADM

## 2024-08-22 RX ORDER — DEXAMETHASONE 4 MG/1
4 TABLET ORAL EVERY 12 HOURS SCHEDULED
Status: DISCONTINUED | OUTPATIENT
Start: 2024-08-22 | End: 2024-08-23 | Stop reason: HOSPADM

## 2024-08-22 RX ADMIN — Medication 3 MG: at 21:25

## 2024-08-22 RX ADMIN — DEXAMETHASONE SODIUM PHOSPHATE 4 MG: 4 INJECTION INTRA-ARTICULAR; INTRALESIONAL; INTRAMUSCULAR; INTRAVENOUS; SOFT TISSUE at 05:09

## 2024-08-22 RX ADMIN — ASPIRIN 81 MG 81 MG: 81 TABLET ORAL at 09:17

## 2024-08-22 RX ADMIN — FOLIC ACID 1 MG: 1 TABLET ORAL at 09:17

## 2024-08-22 RX ADMIN — LEVETIRACETAM 750 MG: 500 TABLET, FILM COATED ORAL at 17:49

## 2024-08-22 RX ADMIN — LACTOBACILLUS ACIDOPHILUS / LACTOBACILLUS BULGARICUS 1 PACKET: 100 MILLION CFU STRENGTH GRANULES at 21:25

## 2024-08-22 RX ADMIN — Medication 1000 MCG: at 09:17

## 2024-08-22 RX ADMIN — SULFAMETHOXAZOLE AND TRIMETHOPRIM 1 TABLET: 800; 160 TABLET ORAL at 09:17

## 2024-08-22 RX ADMIN — DEXAMETHASONE 4 MG: 4 TABLET ORAL at 17:49

## 2024-08-22 RX ADMIN — PANTOPRAZOLE SODIUM 40 MG: 40 TABLET, DELAYED RELEASE ORAL at 05:09

## 2024-08-22 RX ADMIN — LACTOBACILLUS ACIDOPHILUS / LACTOBACILLUS BULGARICUS 1 PACKET: 100 MILLION CFU STRENGTH GRANULES at 09:17

## 2024-08-22 RX ADMIN — Medication 5000 UNITS: at 09:18

## 2024-08-22 RX ADMIN — SULFAMETHOXAZOLE AND TRIMETHOPRIM 1 TABLET: 800; 160 TABLET ORAL at 21:25

## 2024-08-22 RX ADMIN — DEXAMETHASONE SODIUM PHOSPHATE 4 MG: 4 INJECTION INTRA-ARTICULAR; INTRALESIONAL; INTRAMUSCULAR; INTRAVENOUS; SOFT TISSUE at 00:55

## 2024-08-22 RX ADMIN — LEVETIRACETAM 750 MG: 500 TABLET, FILM COATED ORAL at 09:17

## 2024-08-22 NOTE — PROGRESS NOTES
[facility]  Progress Note  Name: Ayla Nye I  MRN: [id number]  Unit/Bed#: S -01 I Date of Admission: 8/21/2024   Date of Service: 8/22/2024 I Hospital Day: 1    Assessment & Plan   * Acute metabolic encephalopathy  Assessment & Plan  Secondary to vasogenic edema from brain metastases. CTH obtained in the ED showed worsening vasogenic edema of the L occipital and frontal lobes.  Patient received 1 dose of Decadron 10 mg IV in the ED.  Consult radiation oncology and oncology.    Deferred further dosing of Decadron to oncology team.  Patient's home dose of Decadron is 1 mg p.o. daily.    Patient denies any urinary symptoms or cough or cold or any fevers or chills.  No leukocytosis.  No signs of any infection suspected.  Per oncology note patient likely needs more protracted steroid taper.    Plan:   Radiation oncology recommend 4 mg p.o. twice daily for the next 48 hours.   If there change mental or neurologic status, would obtain MRI Brain BT protocol w and w/o contrast. No need for repeat MRI at this time.    After 48 hrs, would recommend slow taper, 4mg AM + 2mg PM x 3 days, then 2mg BID x 3 days, then 2mg AM + 1 mg PM x 3 days, then 1 mg BID x 3 days, then 1 mg daily x 3 days, then stop   PT/OT consulted for balance and coordination issues.    Moderate protein-calorie malnutrition (HCC)  Assessment & Plan  Malnutrition Findings:       Secondary to cancer.    Encourage protein supplement.  Nutrition consult.                       BMI Findings:           Body mass index is 19.07 kg/m².       Insomnia  Assessment & Plan  Patient states she has poor sleep since beginning her treatment.   Reporting 3 hrs / night on average.  Patient received melatonin 3 mg last night and felt more relaxed    Plan:  Continue Melatonin 3 mg QHS      Malignant neoplasm of upper lobe, right bronchus or lung (HCC)  Assessment & Plan  [id number] (Stage IIIB) NSCLC of the RUL   s/p concurrent chemoRT  completed on 7/5/24. Tx w/ Carboplatin Paclitaxel. OP Oncology plans to start future treatment with Durvalumab adjuvant post treatment.  Was admitted for new onset brain mets on 7/29/2024; on decadron tapered down to 1 mg. Found to have 5 intracranial metastatic lesions, which were treated with SRS on 8/8/24.  Pt reports dull intermittent H/A after decreasing Keppra to 500 BID.    Plan:   Oncology and radiation oncology following, see above recommended tapering instructions for steroid.  Continue Keppra to 750 mg twice daily. (Of note patient's dose of Keppra was recently decreased on 8/16/2024 by oncologist to 500 twice daily)  Less likely suspicion for any significant activity or episodes of seizures.    JAK2 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  Assessment & Plan  As mentioned above.    Mixed hyperlipidemia  Assessment & Plan  Plan:   Continue home Lipitor    Essential thrombocytosis (HCC)  Assessment & Plan  Patient is not supposed to be on any hydroxyurea.      Plan:  Continue folic acid.    Oncology recommendations above.             VTE Pharmacologic Prophylaxis: VTE Score: 4 Moderate Risk (Score 3-4) - Pharmacological DVT Prophylaxis Ordered: enoxaparin (Lovenox).    Mobility:   Basic Mobility Inpatient Raw Score: 23  JH-HLM Goal: 7: Walk 25 feet or more  JH-HLM Achieved: 7: Walk 25 feet or more  JH-HLM Goal achieved. Continue to encourage appropriate mobility.    Patient Centered Rounds: I performed bedside rounds with nursing staff today.  Discussions with Specialists or Other Care Team Provider: Oncology/Radiation Oncology recommendations on dexamethasone taper    Education and Discussions with Family / Patient: Patient declined call to .     Current Length of Stay: 1 day(s)  Current Patient Status: Inpatient   Discharge Plan: Anticipate discharge in 24-48 hrs to home.    Code Status: Level 1 - Full Code    Subjective:   Patient is feeling well overall today. Reports she had some confusion yesterday when her  "nurse asked her orientation questions. Patient feels confusion and dysmetria sxs have improved significantly since yesterday. Patient reports a slight frontal dull headache that has been intermittent since she began her chemo/RT therapies. Patient had a headache on admission that was worse than baseline which has since resolved. Patient also endorses occasional tingling \"pins and needles\" in her feet but denies any presently. In the past this was presumed to be due to her chemotherapy. Patient denies any fevers, chills, n/v/d/c, SOB, CP, cough, blurry or double vision, weakness, urinary sxs, recent falls or appetite changes.     Objective:     Vitals:   Temp (24hrs), Av.1 °F (36.7 °C), Min:97.7 °F (36.5 °C), Max:98.4 °F (36.9 °C)    Temp:  [97.7 °F (36.5 °C)-98.4 °F (36.9 °C)] 98.1 °F (36.7 °C)  HR:  [62-77] 66  Resp:  [16-18] 18  BP: (101-132)/(58-73) 101/60  SpO2:  [94 %-98 %] 98 %  Body mass index is 19.07 kg/m².     Input and Output Summary (last 24 hours):     Intake/Output Summary (Last 24 hours) at 2024 1555  Last data filed at 2024 0751  Gross per 24 hour   Intake 240 ml   Output --   Net 240 ml       Physical Exam:   Physical Exam  Vitals and nursing note reviewed.   Constitutional:       Appearance: Normal appearance. She is normal weight.   HENT:      Head: Normocephalic and atraumatic.      Mouth/Throat:      Mouth: Mucous membranes are moist.      Pharynx: Oropharynx is clear.   Eyes:      General: Visual field deficit (OD R inferior quadrant defect, CN otherwise intact) present.      Extraocular Movements: Extraocular movements intact.      Conjunctiva/sclera: Conjunctivae normal.      Pupils: Pupils are equal, round, and reactive to light.   Cardiovascular:      Rate and Rhythm: Normal rate and regular rhythm.      Pulses: Normal pulses.      Heart sounds: Normal heart sounds.   Pulmonary:      Effort: Pulmonary effort is normal.      Breath sounds: Normal breath sounds.   Abdominal:     "  General: Abdomen is flat. Bowel sounds are normal. There is no distension.      Palpations: Abdomen is soft.      Tenderness: There is no abdominal tenderness. There is no guarding.   Musculoskeletal:      Right lower leg: No edema.      Left lower leg: No edema.   Skin:     General: Skin is warm and dry.   Neurological:      Mental Status: She is alert and oriented to person, place, and time.      Sensory: Sensation is intact.      Motor: Motor function is intact.      Coordination: Coordination is intact. Finger-Nose-Finger Test normal.   Psychiatric:         Mood and Affect: Mood normal.         Behavior: Behavior normal.            Additional Data:     Labs:  Results from last 7 days   Lab Units 08/22/24  0607 08/21/24  0817   WBC Thousand/uL 6.16 7.38   HEMOGLOBIN g/dL 10.8* 10.7*   HEMATOCRIT % 33.4* 32.6*   PLATELETS Thousands/uL 224 172   BANDS PCT %  --  2   LYMPHO PCT % 6* 7*   MONO PCT % 3* 3*   EOS PCT % 0 0     Results from last 7 days   Lab Units 08/22/24  0607 08/21/24  0817   SODIUM mmol/L 137 134*   POTASSIUM mmol/L 4.6 4.2   CHLORIDE mmol/L 104 103   CO2 mmol/L 24 25   BUN mg/dL 23 22   CREATININE mg/dL 1.07 1.24   ANION GAP mmol/L 9 6   CALCIUM mg/dL 9.5 8.9   ALBUMIN g/dL  --  4.0   TOTAL BILIRUBIN mg/dL  --  0.24   ALK PHOS U/L  --  39   ALT U/L  --  8   AST U/L  --  14   GLUCOSE RANDOM mg/dL 127 90         Results from last 7 days   Lab Units 08/21/24  0948   POC GLUCOSE mg/dl 83               Lines/Drains:  Invasive Devices       Peripheral Intravenous Line  Duration             Peripheral IV 08/21/24 Right Antecubital 1 day                          Imaging: Personally reviewed the following imaging: CT head    Recent Cultures (last 7 days):         Last 24 Hours Medication List:   Current Facility-Administered Medications   Medication Dose Route Frequency Provider Last Rate    acetaminophen  650 mg Oral Q4H PRN Bro Roman MD      aspirin  81 mg Oral Daily Bro Roman MD       atorvastatin  40 mg Oral QPM Bro Roman MD      Cholecalciferol  5,000 Units Oral Daily ZULEYMA Sorensen      vitamin B-12  1,000 mcg Oral Daily Bro Roman MD      dexamethasone  4 mg Oral Q12H [facility] Kylah Hogue MD      diphenhydrAMINE  25 mg Oral Q8H PRN Bro Roman MD      enoxaparin  30 mg Subcutaneous Daily Bro Roman MD      folic acid  1 mg Oral Daily Bro Roman MD      lactobacillus acidophilus-bulgaricus  1 packet Oral BID Bro Roman MD      levETIRAcetam  750 mg Oral BID Bro Roman MD      melatonin  3 mg Oral HS Michelle Banks MD      ondansetron  4 mg Intravenous Q6H PRN Bro Roman MD      pantoprazole  40 mg Oral Early Morning Bro Roman MD      sulfamethoxazole-trimethoprim  1 tablet Oral Q12H [facility] Bro Roman MD          Today, Patient Was Seen By: Stefani Orona, MS3 and Michelle Banks MD    **Please Note: This note may have been constructed using a voice recognition system.**

## 2024-08-22 NOTE — ASSESSMENT & PLAN NOTE
Malnutrition Findings:       Secondary to cancer.    Encourage protein supplement.  Nutrition consult.                       BMI Findings:           Body mass index is 19.07 kg/m².

## 2024-08-22 NOTE — ASSESSMENT & PLAN NOTE
Patient states she has poor sleep since beginning her treatment.   Reporting 3 hrs / night on average.  Patient received melatonin 3 mg last night and felt more relaxed    Plan:  Continue Melatonin 3 mg QHS

## 2024-08-22 NOTE — ASSESSMENT & PLAN NOTE
Secondary to vasogenic edema from brain metastases. CTH obtained in the ED showed worsening vasogenic edema of the L occipital and frontal lobes.  Patient received 1 dose of Decadron 10 mg IV in the ED.  Consult radiation oncology and oncology.    Deferred further dosing of Decadron to oncology team.  Patient's home dose of Decadron is 1 mg p.o. daily.    Patient denies any urinary symptoms or cough or cold or any fevers or chills.  No leukocytosis.  No signs of any infection suspected.  Per oncology note patient likely needs more protracted steroid taper.    Plan:   Radiation oncology recommend 4 mg p.o. twice daily for the next 48 hours.   If there change mental or neurologic status, would obtain MRI Brain BT protocol w and w/o contrast. No need for repeat MRI at this time.    After 48 hrs, would recommend slow taper, 4mg AM + 2mg PM x 3 days, then 2mg BID x 3 days, then 2mg AM + 1 mg PM x 3 days, then 1 mg BID x 3 days, then 1 mg daily x 3 days, then stop   PT/OT consulted for balance and coordination issues.

## 2024-08-22 NOTE — ASSESSMENT & PLAN NOTE
[id number] (Stage IIIB) NSCLC of the RUL   s/p concurrent chemoRT completed on 7/5/24. Tx w/ Carboplatin Paclitaxel. OP Oncology plans to start future treatment with Durvalumab adjuvant post treatment.  Was admitted for new onset brain mets on 7/29/2024; on decadron tapered down to 1 mg. Found to have 5 intracranial metastatic lesions, which were treated with SRS on 8/8/24.  Pt reports dull intermittent H/A after decreasing Keppra to 500 BID.    Plan:   Oncology and radiation oncology following, see above recommended tapering instructions for steroid.  Continue Keppra to 750 mg twice daily. (Of note patient's dose of Keppra was recently decreased on 8/16/2024 by oncologist to 500 twice daily)  Less likely suspicion for any significant activity or episodes of seizures.

## 2024-08-22 NOTE — ASSESSMENT & PLAN NOTE
Patient is not supposed to be on any hydroxyurea.      Plan:  Continue folic acid.    Oncology recommendations above.

## 2024-08-22 NOTE — PHYSICAL THERAPY NOTE
"PHYSICAL THERAPY EVALUATION  NAME:  Ayla Nye  DATE: 08/22/24    AGE:   73 y.o.  Mrn:   [id number]  Principal problem: Principal Problem:    Acute metabolic encephalopathy  Active Problems:    Essential thrombocytosis (HCC)    Mixed hyperlipidemia    JAK2 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    Malignant neoplasm of upper lobe, right bronchus or lung (HCC)    Insomnia    Moderate protein-calorie malnutrition (HCC)      Vitals:    08/21/24 2254 08/22/24 0600 08/22/24 0751 08/22/24 1501   BP: 103/58  132/61 101/60   BP Location:       Pulse: 62  77 66   Resp: 16  18 18   Temp: 98.4 °F (36.9 °C)  98.3 °F (36.8 °C) 98.1 °F (36.7 °C)   TempSrc:       SpO2: 97%  98% 98%   Weight:  47.3 kg (104 lb 4.4 oz)         Length Of Stay: 1  Performed at least 2 patient identifiers during session: Name and Epic photo  PHYSICAL THERAPY EVALUATION :    08/22/24 1612   PT Last Visit   PT Visit Date 08/22/24   Note Type   Note type Evaluation   Pain Assessment   Pain Assessment Tool 0-10   Pain Score No Pain   Restrictions/Precautions   Weight Bearing Precautions Per Order No   Other Precautions Chair Alarm;Bed Alarm;Fall Risk;Visual impairment   Home Living   Type of Home House   Home Layout Two level  (5 HARRY w/railing, no bed on first floor.)   Home Equipment   (no DME used prior to this admission.  Patient reports having a history of trying a single-point cane in the past, but it \"got in her way and almost made (me) trip\")   Prior Function   Level of [place] Independent with functional mobility;Independent with ADLs;Needs assistance with IADLS  (no driving per pt due to lack of peripheral vision)   Lives With Alone   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  (ex spouse)   IADLs Family/Friend/Other provides transportation;Independent with medication management;Independent with meal prep   Falls in the last 6 months 0   General   Additional Pertinent History 73 y.o. female known to radiation oncology for [id number] (IIIB) NSCLC of the right upper lobe, s/p " concurrent chemoRT completing on 7/5/24. Pt then had 5 intracranial metastatic lesions, treated with SRS on 8/8/24 and paced on dexamethasone taper. CT head w/o contrast in the ED revealed worsening vasogenic edema in the vicinity of a recently treated L occipital lesion.Pt reports she was to start immunotherapy soon   Family/Caregiver Present No   Cognition   Overall Cognitive Status WFL   Arousal/Participation Alert   Orientation Level Oriented to person;Oriented to place   Following Commands Follows one step commands inconsistently   Subjective   Subjective Pt pleasant and cooperative, reports being able to get to /from bathroom w/o A. Describes her spells as having visual spots on R eye, then HA, and then balance issues w R lean in the past   RUE Assessment   RUE Assessment WFL   LUE Assessment   LUE Assessment WFL   Strength RLE   R Hip Flexion 4/5   R Knee Extension 4+/5   R Ankle Dorsiflexion 4+/5   Strength LLE   L Hip Flexion 4/5   L Knee Extension 4+/5   L Ankle Dorsiflexion 4+/5   Vision-Basic Assessment   Current Vision Wears glasses all the time   Patient Visual Report   (reports  lack of peripheral vision premorbid)   Light Touch   RLE Light Touch Grossly intact  (reports + paresthesias @ feet)   LLE Light Touch Grossly intact  (although pt was inconsistent w/ testing, and reports + paresthesias @ feet)   Bed Mobility   Supine to Sit 6  Modified independent   Additional items HOB elevated;Bedrails   Sit to Supine 6  Modified independent   Additional items HOB elevated   Additional Comments Pt able to reposition herself in bed post session   Transfers   Sit to Stand 6  Modified independent   Additional items Armrests   Stand to Sit 6  Modified independent   Additional items Bedrails   Ambulation/Elevation   Gait pattern   (WFL gait speed, runs into obstacles R>L side during gait trial)   Gait Assistance 5  Supervision   Assistive Device None   Distance 150'+50'   Stair Management Assistance 5   "Supervision   Additional items Assist x 1;Verbal cues   Stair Management Technique Two rails;Alternating pattern;Foreward   Number of Stairs 8  (total)   Balance   Static Sitting Normal   Static Standing Good   Ambulatory Fair -   Endurance Deficit   Endurance Deficit No   Activity Tolerance   Medical Staff Made Aware spoke to Tim from case managemt prior to session   Nurse Made Aware spoke to nursing prior to session     Assessment:   Pt is a 73 y.o. adult seen for PT evaluation s/p admit to [facility] on 8/21/2024 w/ Acute metabolic encephalopathy.  Order placed for PT.      Prior to admission: Pt lived alone in a two-story home with steps to enter and flight of stairs inside.  Patient reports being independent with mobility, and does not use any DME prior to this admission.  She reports history of trying to use a single-point cane in the past which \"got in her way\".  Upon evaluation: Pt needed no physical assist for bed mobility, transfers, ambulation, nor on stairs.  She did display some balance deficits during higher-level balance activities, and ran into objects which she attributes to her limited peripheral vision (patient reports x 1 month).      Pt's clinical presentation is currently evolving given the functional mobility deficits above, especially (but not limited to) gait deviations, and combined with medical complications including abnormal H&H, readmission to hospital, and abnormal sodium values.  Pt IS CLOSE to her mobility baseline.  Although she is at risk for falls based on impaired balance, altered vision, and limited sensation/neuropathy, she is able to self correct balance, and \"knows\" where obstacles are in her home environment. She also stated not leaving home w/o another person santiago now due to inability to drive.        During this admission, pt may benefit from continued skilled inpatient PT in the acute care setting in order to address deficits as defined above to maximize " function and mobility.      Recommendations:    From a PT standpoint, recommend next several sessions focus on continued mobility training including for strategies with upright mobility w/ limited balance/vision.      Prognosis Fair   Problem List Impaired balance;Decreased mobility;Impaired vision;Impaired sensation   Barriers to Discharge Decreased caregiver support;Inaccessible home environment   Barriers to Discharge Comments However pt has been in her home for several years, and always has someone with her when out in community   Goals   Patient Goals to go home and see my dog   STG Expiration Date 08/27/24   Short Term Goal #1 Goals: Pt will: Perform transfers with modified I to prepare for transfers. Perform ambulation with LRAD for up to 150' with Supervision to decrease risk for falls. Perform flight of stairs w/unilateral railing w/o DME and w/Supervision to return to home with HARRY and return to [facility] home. Improve 4 point DGI to 10/12 w/o DME to demonstrate less risk for falls.   PT Treatment Day 0   Plan   Treatment/Interventions Functional transfer training;LE strengthening/ROM;Elevations;Therapeutic exercise;Endurance training;Cognitive reorientation;Patient/family training;Equipment eval/education;Bed mobility;Gait training;Spoke to nursing;Spoke to case management;Spoke to MD  (spoke to resident earlier today)   PT Frequency 2-3x/wk   Discharge Recommendation   Rehab Resource Intensity Level, PT III (Minimum Resource Intensity)  (vs no therapy upon DC)   Equipment Recommended   (none @ present time)   Additional Comments 2 Personal factors affecting pt at time of IE include: steps to enter environment, multi-level environment, limited home support, inability to perform IADLs, inability to perform ADLs, inability to ambulate household distances, and inability to navigate community distances.   AM-PAC Basic Mobility Inpatient   Turning in Flat Bed Without Bedrails 4   Lying on Back to Sitting on  "Edge of Flat Bed Without Bedrails 4   Moving Bed to Chair 4   Standing Up From Chair Using Arms 4   Walk in Room 3   Climb 3-5 Stairs With Railing 3   Basic Mobility Inpatient Raw Score 22   Basic Mobility Standardized Score 47.4   [facility] Highest Level Of Mobility   -HLM Goal 7: Walk 25 feet or more   -HLM Achieved 7: Walk 25 feet or more   Dynamic Gait Index   Gait level surface  3   Change in gait speed 3   Gait with horizontal head turns  1   Gait with vertical head turns  2  (4 point DGI 9/12. However pt able to correct all losses of balance including w/ steppage correctiony)   End of Consult   Patient Position at End of Consult Supine;All needs within reach   (Please find full objective findings from PT assessment regarding body systems outlined above).     [facility]ility Inpatient Short Form Raw Score is 22. A Raw score of greater than 16 suggests the patient may benefit from discharge to home, which DOES coincide with CURRENT above PT recommendations.     However please refer to therapist recommendation for discharge planning given other factors that may influence destination.     Adapted from Kj HOWE, Gerry J, Gerardo J, Damien PARTIDA. [facility] “6-Clicks” Basic Mobility and Daily Activity Scores With Discharge Destination. Physical Therapy, 2021;101:1-9. DOI: [email or web address]/ptj/[email or web address]    Portions of the record may have been created with voice recognition software.  Occasional wrong word or \"sound a like\" substitutions may have occurred due to the inherent limitations of voice recognition software.  Read the chart carefully and recognize, using context, where substitutions have occurred    "

## 2024-08-22 NOTE — PLAN OF CARE
"  Problem: PHYSICAL THERAPY ADULT  Goal: Performs mobility at highest level of function for planned discharge setting.  See evaluation for individualized goals.  Description: Treatment/Interventions: Functional transfer training, LE strengthening/ROM, Elevations, Therapeutic exercise, Endurance training, Cognitive reorientation, Patient/family training, Equipment eval/education, Bed mobility, Gait training, Spoke to nursing, Spoke to case management, Spoke to MD (spoke to resident earlier today)  Equipment Recommended:  (none @ present time)       See flowsheet documentation for full assessment, interventions and recommendations.  8/22/2024 1736 by Tye Babb PT  Note: Prognosis: Fair  Problem List: Impaired balance, Decreased mobility, Impaired vision, Impaired sensation  Assessment: Pt is a 73 y.o. adult seen for PT evaluation s/p admit to [facility] on 8/21/2024 w/ Acute metabolic encephalopathy.  Order placed for PT.      Prior to admission: Pt lived alone in a two-story home with steps to enter and flight of stairs inside.  Patient reports being independent with mobility, and does not use any DME prior to this admission.  She reports history of trying to use a single-point cane in the past which \"got in her way\".  Upon evaluation: Pt needed no physical assist for bed mobility, transfers, ambulation, nor on stairs.  She did display some balance deficits during higher-level balance activities, and ran into objects which she attributes to her limited peripheral vision (patient reports x 1 month).      Pt's clinical presentation is currently evolving given the functional mobility deficits above, especially (but not limited to) gait deviations, and combined with medical complications including abnormal H&H, readmission to hospital, and abnormal sodium values.  Pt IS CLOSE to her mobility baseline.  Although she is at risk for falls based on impaired balance, altered vision, and limited " "sensation/neuropathy, she is able to self correct balance, and \"knows\" where obstacles are in her home environment. She also stated not leaving home w/o another person santiago now due to inability to drive.        During this admission, pt may benefit from continued skilled inpatient PT in the acute care setting in order to address deficits as defined above to maximize function and mobility.      Recommendations:     From a PT standpoint, recommend next several sessions focus on continued mobility training including for strategies with upright mobility w/ limited balance/vision.  Barriers to Discharge: Decreased caregiver support, Inaccessible home environment  Barriers to Discharge Comments: However pt has been in her home for several years, and always has someone with her when out in community  Rehab Resource Intensity Level, PT: III (Minimum Resource Intensity) (vs no therapy upon DC)    See flowsheet documentation for full assessment.        "

## 2024-08-23 ENCOUNTER — TELEPHONE (OUTPATIENT)
Age: 74
End: 2024-08-23

## 2024-08-23 VITALS
OXYGEN SATURATION: 99 % | BODY MASS INDEX: 19.26 KG/M2 | DIASTOLIC BLOOD PRESSURE: 70 MMHG | WEIGHT: 105.3 LBS | RESPIRATION RATE: 18 BRPM | SYSTOLIC BLOOD PRESSURE: 130 MMHG | TEMPERATURE: 97.8 F | HEART RATE: 67 BPM

## 2024-08-23 LAB
ALBUMIN SERPL BCG-MCNC: 4.2 G/DL (ref 3.5–5)
ALP SERPL-CCNC: 39 U/L (ref 34–104)
ALT SERPL W P-5'-P-CCNC: 11 U/L (ref 7–52)
ANION GAP SERPL CALCULATED.3IONS-SCNC: 5 MMOL/L (ref 4–13)
AST SERPL W P-5'-P-CCNC: 13 U/L (ref 5–45)
BASOPHILS # BLD AUTO: 0.02 THOUSANDS/ÂΜL (ref 0–0.1)
BASOPHILS NFR BLD AUTO: 0 % (ref 0–1)
BILIRUB DIRECT SERPL-MCNC: 0.03 MG/DL (ref 0–0.2)
BILIRUB SERPL-MCNC: 0.21 MG/DL (ref 0.2–1)
BUN SERPL-MCNC: 30 MG/DL (ref 5–25)
CALCIUM SERPL-MCNC: 9.5 MG/DL (ref 8.4–10.2)
CHLORIDE SERPL-SCNC: 105 MMOL/L (ref 96–108)
CO2 SERPL-SCNC: 27 MMOL/L (ref 21–32)
CREAT SERPL-MCNC: 1.14 MG/DL (ref 0.6–1.3)
EOSINOPHIL # BLD AUTO: 0 THOUSAND/ÂΜL (ref 0–0.61)
EOSINOPHIL NFR BLD AUTO: 0 % (ref 0–6)
ERYTHROCYTE [DISTWIDTH] IN BLOOD BY AUTOMATED COUNT: 15 % (ref 11.6–15.1)
GLUCOSE SERPL-MCNC: 93 MG/DL (ref 65–140)
HCT VFR BLD AUTO: 35 % (ref 36.5–46.1)
HGB BLD-MCNC: 11.2 G/DL (ref 12–15.4)
IMM GRANULOCYTES # BLD AUTO: 0.07 THOUSAND/UL (ref 0–0.2)
IMM GRANULOCYTES NFR BLD AUTO: 1 % (ref 0–2)
LYMPHOCYTES # BLD AUTO: 0.28 THOUSANDS/ÂΜL (ref 0.6–4.47)
LYMPHOCYTES NFR BLD AUTO: 4 % (ref 14–44)
MCH RBC QN AUTO: 32.3 PG (ref 26.8–34.3)
MCHC RBC AUTO-ENTMCNC: 32 G/DL (ref 31.4–37.4)
MCV RBC AUTO: 101 FL (ref 82–98)
MONOCYTES # BLD AUTO: 0.44 THOUSAND/ÂΜL (ref 0.17–1.22)
MONOCYTES NFR BLD AUTO: 6 % (ref 4–12)
NEUTROPHILS # BLD AUTO: 6.22 THOUSANDS/ÂΜL (ref 1.85–7.62)
NEUTS SEG NFR BLD AUTO: 89 % (ref 43–75)
NRBC BLD AUTO-RTO: 0 /100 WBCS
PLATELET # BLD AUTO: 228 THOUSANDS/UL (ref 149–390)
PMV BLD AUTO: 8.5 FL (ref 8.9–12.7)
POTASSIUM SERPL-SCNC: 4.3 MMOL/L (ref 3.5–5.3)
PROT SERPL-MCNC: 6.6 G/DL (ref 6.4–8.4)
RBC # BLD AUTO: 3.47 MILLION/UL (ref 3.88–5.12)
SODIUM SERPL-SCNC: 137 MMOL/L (ref 135–147)
WBC # BLD AUTO: 7.03 THOUSAND/UL (ref 4.31–10.16)

## 2024-08-23 PROCEDURE — 80076 HEPATIC FUNCTION PANEL: CPT | Performed by: INTERNAL MEDICINE

## 2024-08-23 PROCEDURE — 85025 COMPLETE CBC W/AUTO DIFF WBC: CPT

## 2024-08-23 PROCEDURE — 99238 HOSP IP/OBS DSCHRG MGMT 30/<: CPT | Performed by: INTERNAL MEDICINE

## 2024-08-23 PROCEDURE — 80048 BASIC METABOLIC PNL TOTAL CA: CPT

## 2024-08-23 RX ORDER — DEXAMETHASONE 2 MG/1
2 TABLET ORAL
Qty: 3 TABLET | Refills: 0 | Status: SHIPPED | OUTPATIENT
Start: 2024-08-24 | End: 2024-08-28

## 2024-08-23 RX ORDER — DEXAMETHASONE 2 MG/1
2 TABLET ORAL 2 TIMES DAILY WITH MEALS
Qty: 6 TABLET | Refills: 0 | Status: SHIPPED | OUTPATIENT
Start: 2024-08-27 | End: 2024-08-30

## 2024-08-23 RX ORDER — DEXAMETHASONE 2 MG/1
2 TABLET ORAL
Qty: 3 TABLET | Refills: 0 | Status: SHIPPED | OUTPATIENT
Start: 2024-08-30 | End: 2024-09-02

## 2024-08-23 RX ORDER — DEXAMETHASONE 1 MG
1 TABLET ORAL
Qty: 3 TABLET | Refills: 0 | Status: SHIPPED | OUTPATIENT
Start: 2024-08-30 | End: 2024-09-02

## 2024-08-23 RX ORDER — PANTOPRAZOLE SODIUM 40 MG/1
40 TABLET, DELAYED RELEASE ORAL DAILY
Qty: 30 TABLET | Refills: 0 | Status: SHIPPED | OUTPATIENT
Start: 2024-08-23 | End: 2024-08-27

## 2024-08-23 RX ORDER — DEXAMETHASONE 1 MG
1 TABLET ORAL 2 TIMES DAILY WITH MEALS
Qty: 6 TABLET | Refills: 0 | Status: SHIPPED | OUTPATIENT
Start: 2024-09-03 | End: 2024-09-06

## 2024-08-23 RX ORDER — DEXAMETHASONE 4 MG/1
4 TABLET ORAL
Qty: 3 TABLET | Refills: 0 | Status: SHIPPED | OUTPATIENT
Start: 2024-08-24 | End: 2024-08-28

## 2024-08-23 RX ORDER — DEXAMETHASONE 1 MG
1 TABLET ORAL
Qty: 3 TABLET | Refills: 0 | Status: SHIPPED | OUTPATIENT
Start: 2024-09-07 | End: 2024-09-10

## 2024-08-23 RX ADMIN — LEVETIRACETAM 750 MG: 500 TABLET, FILM COATED ORAL at 08:40

## 2024-08-23 RX ADMIN — ASPIRIN 81 MG 81 MG: 81 TABLET ORAL at 08:40

## 2024-08-23 RX ADMIN — Medication 1000 MCG: at 08:40

## 2024-08-23 RX ADMIN — FOLIC ACID 1 MG: 1 TABLET ORAL at 08:40

## 2024-08-23 RX ADMIN — Medication 5000 UNITS: at 08:40

## 2024-08-23 RX ADMIN — LACTOBACILLUS ACIDOPHILUS / LACTOBACILLUS BULGARICUS 1 PACKET: 100 MILLION CFU STRENGTH GRANULES at 08:40

## 2024-08-23 RX ADMIN — DEXAMETHASONE 4 MG: 4 TABLET ORAL at 08:40

## 2024-08-23 RX ADMIN — SULFAMETHOXAZOLE AND TRIMETHOPRIM 1 TABLET: 800; 160 TABLET ORAL at 08:40

## 2024-08-23 RX ADMIN — PANTOPRAZOLE SODIUM 40 MG: 40 TABLET, DELAYED RELEASE ORAL at 05:38

## 2024-08-23 NOTE — DISCHARGE INSTR - AVS FIRST PAGE
Dear Ayla Nye,     It was our pleasure to care for you here at [facility] at the [facility].  It is our hope that we were always able to exceed the expected standards for your care during your stay.  You were hospitalized due to Acute Metabolic Encephalopathy.  You were cared for on the Fourth floor by Michelle Banks MD under the service of Remi Kam MD with the [facility] who covers for your primary care physician (PCP), Rafy Angelo MD, while you were hospitalized.  If you have any questions or concerns related to this hospitalization, you may contact us at .  For follow up as well as any medication refills, we recommend that you follow up with your primary care physician.  A registered nurse will reach out to you by phone within a few days after your discharge to answer any additional questions that you may have after going home.  However, at this time we provide for you here, the most important instructions / recommendations at discharge:     Notable Medication Adjustments -   Decadron taper has been added. From August 24 to August 27: Please take 4 mg AM + 2mg PM x 3 days, then 2mg BID x 3 days, then 2mg AM + 1 mg PM x 3 days, then 1 mg BID x 3 days, then 1 mg daily x 3 days, then stop.   Protonix 40 mg tablet has been added. Please take 1 tablet by mouth daily during your Decadron taper.   Keppra 750 mg tablet has been added. Please continue to take as prescribed.   Testing Required after Discharge -   No testing require outpatient     Important follow up information -   Please follow-up with your primary doctor within 1 week of discharge  Please follow-up with your Oncologist outpatient   Ambulatory referral for physical therapy has been attached. Please call to make an appointment   Please follow-up with your radiology-oncology doctor upon discharge  Other Instructions -   Please continue to take all medications as prescribed.    Please returned to the Emergency department if you are having change in mental status, visual disturbance, and severe headache   Please review this entire after visit summary as additional general instructions including medication list, appointments, activity, diet, any pertinent wound care, and other additional recommendations from your care team that may be provided for you.      Sincerely,     Michelle Banks MD

## 2024-08-23 NOTE — PLAN OF CARE
Problem: Potential for Falls  Goal: Patient will remain free of falls  Description: INTERVENTIONS:  - Educate patient/family on patient safety including physical limitations  - Instruct patient to call for assistance with activity   - Consult OT/PT to assist with strengthening/mobility   - Keep Call bell within reach  - Keep bed low and locked with side rails adjusted as appropriate  - Keep care items and personal belongings within reach  - Initiate and maintain comfort rounds  - Apply yellow socks and bracelet for high fall risk patients  - Consider moving patient to room near nurses station  Outcome: Progressing     Problem: SAFETY ADULT  Goal: Patient will remain free of falls  Description: INTERVENTIONS:  - Educate patient/family on patient safety including physical limitations  - Instruct patient to call for assistance with activity   - Consult OT/PT to assist with strengthening/mobility   - Keep Call bell within reach  - Keep bed low and locked with side rails adjusted as appropriate  - Keep care items and personal belongings within reach  - Initiate and maintain comfort rounds  - Make Fall Risk Sign visible to staff  - Apply yellow socks and bracelet for high fall risk patients  - Consider moving patient to room near nurses station  Outcome: Progressing     Problem: DISCHARGE PLANNING  Goal: Discharge to home or other facility with appropriate resources  Description: INTERVENTIONS:  - Identify barriers to discharge w/patient and caregiver  - Arrange for needed discharge resources and transportation as appropriate  - Identify discharge learning needs (meds, wound care, etc.)  - Arrange for interpretive services to assist at discharge as needed  - Refer to Case Management Department for coordinating discharge planning if the patient needs post-hospital services based on physician/advanced practitioner order or complex needs related to functional status, cognitive ability, or social support system  Outcome:  Progressing     Problem: Knowledge Deficit  Goal: Patient/family/caregiver demonstrates understanding of disease process, treatment plan, medications, and discharge instructions  Description: Complete learning assessment and assess knowledge base.  Interventions:  - Provide teaching at level of understanding  - Provide teaching via preferred learning methods  Outcome: Progressing     Problem: NEUROSENSORY - ADULT  Goal: Achieves stable or improved neurological status  Description: INTERVENTIONS  - Monitor and report changes in neurological status  - Monitor vital signs such as temperature, blood pressure, glucose, and any other labs ordered   - Initiate measures to prevent increased intracranial pressure  - Monitor for seizure activity and implement precautions if appropriate      Outcome: Progressing     Problem: MUSCULOSKELETAL - ADULT  Goal: Maintain or return mobility to safest level of function  Description: INTERVENTIONS:  - Assess patient's ability to carry out ADLs; assess patient's baseline for ADL function and identify physical deficits which impact ability to perform ADLs (bathing, care of mouth/teeth, toileting, grooming, dressing, etc.)  - Assess/evaluate cause of self-care deficits   - Assess range of motion  - Assess patient's mobility  - Assess patient's need for assistive devices and provide as appropriate  - Encourage maximum independence but intervene and supervise when necessary  - Involve family in performance of ADLs  - Assess for home care needs following discharge   - Consider OT consult to assist with ADL evaluation and planning for discharge  - Provide patient education as appropriate  Outcome: Progressing

## 2024-08-23 NOTE — DISCHARGE SUMMARY
[facility]  Discharge- Ayla Nye 1950, 73 y.o. adult MRN: [id number]  Unit/Bed#: S -[facility] Encounter: [id number]  Primary Care Provider: Rafy Angelo MD   Date and time admitted to hospital: 8/21/2024  7:46 AM    * Acute metabolic encephalopathy  Assessment & Plan  Secondary to vasogenic edema from brain metastases. CTH obtained in the ED showed worsening vasogenic edema of the L occipital and frontal lobes.  Patient received 1 dose of Decadron 10 mg IV in the ED.Patient's home dose of Decadron is 1 mg p.o. daily.    radiation oncology and oncology recommendation  appreciated     Patient symptoms improved   Per oncology note patient required low steroid taper.  Completed 4 mg twice daily for 48 hours inpatient    Plan:   Decadron taper: 4mg AM + 2mg PM x 3 days, then 2mg BID x 3 days, then 2mg AM + 1 mg PM x 3 days, then 1 mg BID x 3 days, then 1 mg daily x 3 days, then stop   PT/OT Level III, referral for home PT  Follow up with OP oncologist and primary doctor  Keppra 750 mg twice daily    Insomnia  Assessment & Plan  Patient states she has poor sleep since beginning her treatment.   Reporting 3 hrs / night on average.  Patient received melatonin 3 mg last night and slept 6 hours.     Plan:  Continue Melatonin 3 mg QHS      Malignant neoplasm of upper lobe, right bronchus or lung (HCC)  Assessment & Plan  [id number] (Stage IIIB) NSCLC of the RUL   s/p concurrent chemoRT completed on 7/5/24. Tx w/ Carboplatin Paclitaxel. OP Oncology plans to start future treatment with Durvalumab adjuvant post treatment.  Was admitted for new onset brain mets on 7/29/2024; on decadron tapered down to 1 mg. Found to have 5 intracranial metastatic lesions, which were treated with SRS on 8/8/24.  Pt reports dull intermittent H/A after decreasing Keppra to 500 BID. (patient's dose of Keppra was recently decreased on 8/16/2024 by oncologist to 500 twice daily) Per patient, oncologist felt 750  mg BID was a high dose for patient. No SE noted.    Plan:   See above for Rad Onc recommended tapering instructions for steroid.  Continue Keppra to 750 mg twice daily.     Moderate protein-calorie malnutrition (HCC)  Assessment & Plan  Malnutrition Findings:       Secondary to cancer.    Encourage protein supplement.  Nutrition consult.                       BMI Findings:           Body mass index is 19.26 kg/m².       JAK2 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  Assessment & Plan  As mentioned above.    Mixed hyperlipidemia  Assessment & Plan  Plan:   Continue home Lipitor    Essential thrombocytosis (HCC)  Assessment & Plan  Patient is not supposed to be on any hydroxyurea.      Plan:  Continue folic acid.    Oncology recommendations above.        Medical Problems       Resolved Problems  Date Reviewed: 8/22/2024   None       Discharging Resident: SUKH Campos  Discharging Attending: No att. providers found  PCP: Rafy Angelo MD  Admission Date:   Admission Orders (From admission, onward)       Ordered        08/21/24 1028  INPATIENT ADMISSION  Once                          Discharge Date: 08/23/24    Consultations During Hospital Stay:  Radiation oncology for recommended Dexamethasone tapering instructions given patient's worsening vasogenic edema on CTH secondary to recent stereotactic radiation on 8/8 for brain metastases.    Procedures Performed:   None    Significant Findings / Test Results:   CTH on 8/21/2024: 1. Worsening vasogenic edema in particular involving the left parietal and occipital lobes, corresponding to worsening vasogenic edema surrounding the patient's known left occipital lobe metastasis as better depicted on prior brain MRI. This results in   mass effect on the posterior body and trigone of left lateral ventricle..  2. Worsening vasogenic edema involving the parafalcine left frontal lobe at the site of the patient's known intracranial metastasis in this region as seen on prior brain MRI.    Incidental  Findings:    None      Test Results Pending at Discharge (will require follow up):  None     Outpatient Tests Requested:  None    Complications:  None    Reason for Admission: Confusion, uncoordinated movements in the setting of recent stereotactic radiotherapy and brain metastases    Hospital Course:   Ayla Nye is a 73 y.o. adult female patient with past medical history of metastatic lung cancer with mets to the brain, ET with JAK2 mutation, who originally presented to the hospital on 8/21/2024 due acute encephalopathy secondary to the metastatic lesion in the brain from lung cancer primary.  In the ED, labs/vitals were unremarkable but CT head was evident for worsening vasogenic edema particularly involving the left parietal occipital lobe surrounding patient's known metastatic lesion.  Vasogenic edema was also involved in the parafalcine left frontal lobe.Radiation Oncologist consulted and recommended a slower taper of dexamethasone. In the ED, Keppra was increased to 750 mg BID as per neurology recommendations at last admission.  Patient was discharged home with Decadron taper, Protonix, and Keppra 750 mg twice daily.  Patient will need to follow-up with her oncologist, PCP, and neurology outpatient.    Please see above list of diagnoses and related plan for additional information.     Condition at Discharge: good    Discharge Day Visit / Exam:   Subjective:  Patient was seen and examined this AM. Patient reports no recurrence of her confusion or balance or coordination issues since admission. Patient agrees with plan to taper steroid slowly. Patient reports she slept very well with melatonin last night and got 6 hours sleep, increased from her usual 2-3 hours. Patient is denying any fevers, chills, n/v/d, lightheadedness, CP, SOB, flashes or blurry vision, or H/A    Vitals: Blood Pressure: 130/70 (08/23/24 0700)  Pulse: 67 (08/23/24 0700)  Temperature: 97.8 °F (36.6 °C) (08/23/24 0700)  Temp Source:  Oral (08/21/24 1948)  Respirations: 18 (08/23/24 0700)  Weight - Scale: 47.8 kg (105 lb 4.8 oz) (08/23/24 0600)  SpO2: 99 % (08/23/24 0700)  Exam:   Physical Exam  Vitals and nursing note reviewed.   Constitutional:       Appearance: Normal appearance.   HENT:      Head: Normocephalic and atraumatic.      Mouth/Throat:      Mouth: Mucous membranes are moist.      Pharynx: Oropharynx is clear.   Eyes:      Extraocular Movements: Extraocular movements intact.      Conjunctiva/sclera: Conjunctivae normal.      Pupils: Pupils are equal, round, and reactive to light.   Cardiovascular:      Rate and Rhythm: Normal rate and regular rhythm.      Heart sounds: Normal heart sounds.   Pulmonary:      Effort: Pulmonary effort is normal.      Breath sounds: Normal breath sounds.   Abdominal:      General: Abdomen is flat. Bowel sounds are normal. There is no distension.      Palpations: Abdomen is soft.      Tenderness: There is no abdominal tenderness. There is no guarding.   Musculoskeletal:      Right lower leg: No edema.      Left lower leg: No edema.   Skin:     General: Skin is warm and dry.   Neurological:      General: No focal deficit present.      Mental Status: She is alert and oriented to person, place, and time. Mental status is at baseline.      Cranial Nerves: Cranial nerve deficit (OU R inferior quadrant VF defect, all other CN intact) present.      Sensory: No sensory deficit.      Motor: No weakness.      Coordination: Coordination normal.   Psychiatric:         Mood and Affect: Mood normal.         Behavior: Behavior normal.            Discussion with Family: Patient declined call to .     Discharge instructions/Information to patient and family:   See after visit summary for information provided to patient and family.      Provisions for Follow-Up Care:  See after visit summary for information related to follow-up care and any pertinent home health orders.      Mobility at time of Discharge:    Basic Mobility Inpatient Raw Score: 24  JH-HLM Goal: 8: Walk 250 feet or more  JH-HLM Achieved: 8: Walk 250 feet ot more  HLM Goal achieved. Continue to encourage appropriate mobility.     Disposition:   Home    Planned Readmission: None    Discharge Medications:  See after visit summary for reconciled discharge medications provided to patient and/or family.      **Please Note: This note may have been constructed using a voice recognition system**

## 2024-08-23 NOTE — PLAN OF CARE
Problem: Potential for Falls  Goal: Patient will remain free of falls  Description: INTERVENTIONS:  - Educate patient/family on patient safety including physical limitations  - Instruct patient to call for assistance with activity   - Consult OT/PT to assist with strengthening/mobility   - Keep Call bell within reach  - Keep bed low and locked with side rails adjusted as appropriate  - Keep care items and personal belongings within reach  - Initiate and maintain comfort rounds  - Make Fall Risk Sign visible to staff  - Offer Toileting every  Hours, in advance of need  - Initiate/Maintain alarm  - Obtain necessary fall risk management equipment:   - Apply yellow socks and bracelet for high fall risk patients  - Consider moving patient to room near nurses station  Outcome: Progressing     Problem: SAFETY ADULT  Goal: Patient will remain free of falls  Description: INTERVENTIONS:  - Educate patient/family on patient safety including physical limitations  - Instruct patient to call for assistance with activity   - Consult OT/PT to assist with strengthening/mobility   - Keep Call bell within reach  - Keep bed low and locked with side rails adjusted as appropriate  - Keep care items and personal belongings within reach  - Initiate and maintain comfort rounds  - Make Fall Risk Sign visible to staff  - Offer Toileting every  Hours, in advance of need  - Initiate/Maintain alarm  - Obtain necessary fall risk management equipment:   - Apply yellow socks and bracelet for high fall risk patients  - Consider moving patient to room near nurses station  Outcome: Progressing

## 2024-08-23 NOTE — PLAN OF CARE
Problem: Potential for Falls  Goal: Patient will remain free of falls  Description: INTERVENTIONS:  - Educate patient/family on patient safety including physical limitations  - Instruct patient to call for assistance with activity   - Consult OT/PT to assist with strengthening/mobility   - Keep Call bell within reach  - Keep bed low and locked with side rails adjusted as appropriate  - Keep care items and personal belongings within reach  - Initiate and maintain comfort rounds  - Make Fall Risk Sign visible to staff  - Apply yellow socks and bracelet for high fall risk patients  - Consider moving patient to room near nurses station  8/23/2024 1433 by Myla Ptaino RN  Outcome: Adequate for Discharge  8/23/2024 0849 by Myla Patino RN  Outcome: Progressing     Problem: SAFETY ADULT  Goal: Patient will remain free of falls  Description: INTERVENTIONS:  - Educate patient/family on patient safety including physical limitations  - Instruct patient to call for assistance with activity   - Consult OT/PT to assist with strengthening/mobility   - Keep Call bell within reach  - Keep bed low and locked with side rails adjusted as appropriate  - Keep care items and personal belongings within reach  - Initiate and maintain comfort rounds  - Make Fall Risk Sign visible to staff  - Apply yellow socks and bracelet for high fall risk patients  - Consider moving patient to room near nurses station  8/23/2024 1433 by Myla Patino RN  Outcome: Adequate for Discharge  8/23/2024 0849 by Myla Patino RN  Outcome: Progressing     Problem: DISCHARGE PLANNING  Goal: Discharge to home or other facility with appropriate resources  Description: INTERVENTIONS:  - Identify barriers to discharge w/patient and caregiver  - Arrange for needed discharge resources and transportation as appropriate  - Identify discharge learning needs (meds, wound care, etc.)  - Arrange for interpretive services to assist at discharge as  needed  - Refer to Case Management Department for coordinating discharge planning if the patient needs post-hospital services based on physician/advanced practitioner order or complex needs related to functional status, cognitive ability, or social support system  8/23/2024 1433 by Myla Patino RN  Outcome: Adequate for Discharge  8/23/2024 0849 by Myla Patino RN  Outcome: Progressing     Problem: Knowledge Deficit  Goal: Patient/family/caregiver demonstrates understanding of disease process, treatment plan, medications, and discharge instructions  Description: Complete learning assessment and assess knowledge base.  Interventions:  - Provide teaching at level of understanding  - Provide teaching via preferred learning methods  8/23/2024 1433 by Myla Patino RN  Outcome: Adequate for Discharge  8/23/2024 0849 by Myla Patino RN  Outcome: Progressing     Problem: NEUROSENSORY - ADULT  Goal: Achieves stable or improved neurological status  Description: INTERVENTIONS  - Monitor and report changes in neurological status  - Monitor vital signs such as temperature, blood pressure, glucose, and any other labs ordered   - Initiate measures to prevent increased intracranial pressure  - Monitor for seizure activity and implement precautions if appropriate      8/23/2024 1433 by Myla Patino RN  Outcome: Adequate for Discharge  8/23/2024 0849 by Myla Patino RN  Outcome: Progressing     Problem: MUSCULOSKELETAL - ADULT  Goal: Maintain or return mobility to safest level of function  Description: INTERVENTIONS:  - Assess patient's ability to carry out ADLs; assess patient's baseline for ADL function and identify physical deficits which impact ability to perform ADLs (bathing, care of mouth/teeth, toileting, grooming, dressing, etc.)  - Assess/evaluate cause of self-care deficits   - Assess range of motion  - Assess patient's mobility  - Assess patient's need for assistive devices and  provide as appropriate  - Encourage maximum independence but intervene and supervise when necessary  - Involve family in performance of ADLs  - Assess for home care needs following discharge   - Consider OT consult to assist with ADL evaluation and planning for discharge  - Provide patient education as appropriate  8/23/2024 1433 by Myla Patino RN  Outcome: Adequate for Discharge  8/23/2024 0849 by Myla Patino RN  Outcome: Progressing

## 2024-08-23 NOTE — TELEPHONE ENCOUNTER
Pt called in stating that she is being discharged today but is unsure if Dr. Castro still wants her to complete infusion this coming Monday. Pt would like a call back to discuss at [phone]. Thank you.

## 2024-08-23 NOTE — PROGRESS NOTES
Patient:    MRN:  [id number]    [place] Request ID:  [id number]    Level of care reserved:  Home Health Agency    Partner Reserved:  [place] [id number] [phone]    Clinical needs requested:    Geography searched:  [id number]    Start of Service:    Request sent:  11:12am EDT on 8/23/2024 by Tim Marquez    Partner reserved:  2:30pm EDT on 8/23/2024 by Tim Marquez    Choice list shared:  1:41pm EDT on 8/23/2024 by Tim Marquez

## 2024-08-23 NOTE — ASSESSMENT & PLAN NOTE
Malnutrition Findings:       Secondary to cancer.    Encourage protein supplement.  Nutrition consult.                       BMI Findings:           Body mass index is 19.26 kg/m².

## 2024-08-23 NOTE — ASSESSMENT & PLAN NOTE
[id number] (Stage IIIB) NSCLC of the RUL   s/p concurrent chemoRT completed on 7/5/24. Tx w/ Carboplatin Paclitaxel. OP Oncology plans to start future treatment with Durvalumab adjuvant post treatment.  Was admitted for new onset brain mets on 7/29/2024; on decadron tapered down to 1 mg. Found to have 5 intracranial metastatic lesions, which were treated with SRS on 8/8/24.  Pt reports dull intermittent H/A after decreasing Keppra to 500 BID. (patient's dose of Keppra was recently decreased on 8/16/2024 by oncologist to 500 twice daily) Per patient, oncologist felt 750 mg BID was a high dose for patient. No SE noted.    Plan:   See above for Rad Onc recommended tapering instructions for steroid.  Continue Keppra to 750 mg twice daily.

## 2024-08-23 NOTE — TELEPHONE ENCOUNTER
Title: Treatment 8/26    Holding pembrolizumab due to hospitalization         Is the patient scheduled within 24 hours?? If yes, follow up with verbal telephone call.    Office RN to route to INF  pool. Infusion  pool routes to INF TECH POOL.    Infusion tech to receive message, confirm scheduled treatment duration matches ordered treatment duration or adjust accordingly, and re-link appointment request orders.    Infusion tech to notify pharmacy and finance.

## 2024-08-23 NOTE — CASE MANAGEMENT
Case Management Discharge Planning Note    Patient name Ayla Nye  Location S /S -01 MRN [id number]  : 1950 Date 2024       Current Admission Date: 2024  Current Admission Diagnosis:Acute metabolic encephalopathy   Patient Active Problem List    Diagnosis Date Noted Date Diagnosed    Acute metabolic encephalopathy 2024     Malignant neoplasm metastatic to brain (HCC) 2024     Brain mass 2024     Metastatic cancer to brain (HCC) 2024     Visual disturbance 2024     SIRS (systemic inflammatory response syndrome) (HCC) 2024     Dehydration 2024     Moderate protein-calorie malnutrition (HCC) 2024     Bilateral leg pain 2024     Insomnia 2024     Malignant neoplasm of upper lobe, right bronchus or lung (HCC) 2024     Age-related osteoporosis without current pathological fracture 2023     Encounter for monitoring of hydroxyurea therapy 2023     JAK2 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 2023     Hypertension 08/10/2022     Mixed hyperlipidemia 08/10/2022     Essential thrombocytosis (HCC) 2020     TB lung, 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     Psoriatic arthritis (HCC) 09/10/2019       LOS (days): 2  Geometric Mean LOS (GMLOS) (days): 4  Days to GMLOS:1.8     OBJECTIVE:  Risk of Unplanned Readmission Score: 26.64         Current admission status: Inpatient   Preferred Pharmacy:   [facility] [place], [place] - [id number] [facility] [address]  Phone: [phone] Fax: [phone]    Primary Care Provider: Rafy Angelo MD    Primary Insurance: MEDICARE  Secondary Insurance: [employer]    DISCHARGE DETAILS:    Discharge planning discussed with:: Patient  Freedom of Choice: Yes  Comments - Freedom of Choice: Provided patient with available C agencies  CM contacted family/caregiver?: No- see comments  Were Treatment Team discharge recommendations reviewed with  patient/caregiver?: Yes  Did patient/caregiver verbalize understanding of patient care needs?: Yes  Were patient/caregiver advised of the risks associated with not following Treatment Team discharge recommendations?: Yes    Contacts  Patient Contacts: Patient  Relationship to Patient:: Other (Comment)  Contact Method: In Person  Reason/Outcome: Discharge Planning, Continuity of Care    Requested Home Health Care         Is the patient interested in HHC at discharge?: Yes  Home Health Discipline requested:: Nursing, Occupational Therapy, Physical Therapy  Home Health Agency Name:: [facility] External Referral Reason (only applicable if external HHA name selected): Scheduling access issues  Home Health Follow-Up Provider:: PCP  Home Health Services Needed:: Evaluate Functional Status and Safety, Gait/ADL Training, Strengthening/Theraputic Exercises to Improve Function  Homebound Criteria Met:: Requires the Assistance of Another Person for Safe Ambulation or to Leave the Home  Supporting Clincal Findings:: Fatigues Easliy in Short Distances, Limited Endurance    DME Referral Provided  Referral made for DME?: No    Other Referral/Resources/Interventions Provided:  Interventions: C         Treatment Team Recommendation: Home with Home Health Care  Discharge Destination Plan:: Home with Home Health Care  Transport at Discharge : Family                                         Reviewed with patient HHC vs outpatient PT/OT.  Patient is interested in HHC and a blanket referral was made.    CM provided patient with a list of the available [facility] agencies from which she selected [facility].  CM reserved with them and placed their contact information on the AVS.    CM reviewed the availability of treatment team to discuss questions or concerns patient and/or family may have regarding  understanding medications and recognizing signs and symptoms once discharged.  CM also encouraged patient to follow up with all recommended  appointments after discharge. Patient advised of importance for patient and family to participate in managing patient's medical well being.

## 2024-08-23 NOTE — ASSESSMENT & PLAN NOTE
Secondary to vasogenic edema from brain metastases. CTH obtained in the ED showed worsening vasogenic edema of the L occipital and frontal lobes.  Patient received 1 dose of Decadron 10 mg IV in the ED.Patient's home dose of Decadron is 1 mg p.o. daily.    radiation oncology and oncology recommendation  appreciated     Patient symptoms improved   Per oncology note patient required low steroid taper.  Completed 4 mg twice daily for 48 hours inpatient    Plan:   Decadron taper: 4mg AM + 2mg PM x 3 days, then 2mg BID x 3 days, then 2mg AM + 1 mg PM x 3 days, then 1 mg BID x 3 days, then 1 mg daily x 3 days, then stop   PT/OT Level III, referral for home PT  Follow up with OP oncologist and primary doctor  Keppra 750 mg twice daily

## 2024-08-23 NOTE — ASSESSMENT & PLAN NOTE
Patient states she has poor sleep since beginning her treatment.   Reporting 3 hrs / night on average.  Patient received melatonin 3 mg last night and slept 6 hours.     Plan:  Continue Melatonin 3 mg QHS

## 2024-08-24 ENCOUNTER — APPOINTMENT (OUTPATIENT)
Dept: LAB | Facility: CLINIC | Age: 74
End: 2024-08-24
Payer: MEDICARE

## 2024-08-24 DIAGNOSIS — C34.11 MALIGNANT NEOPLASM OF UPPER LOBE, RIGHT BRONCHUS OR LUNG (HCC): ICD-10-CM

## 2024-08-24 LAB
ALBUMIN SERPL BCG-MCNC: 4.3 G/DL (ref 3.5–5)
ALP SERPL-CCNC: 42 U/L (ref 34–104)
ALT SERPL W P-5'-P-CCNC: 10 U/L (ref 7–52)
ANION GAP SERPL CALCULATED.3IONS-SCNC: 8 MMOL/L (ref 4–13)
AST SERPL W P-5'-P-CCNC: 12 U/L (ref 5–45)
BASOPHILS # BLD AUTO: 0.01 THOUSANDS/ÂΜL (ref 0–0.1)
BASOPHILS NFR BLD AUTO: 0 % (ref 0–1)
BILIRUB SERPL-MCNC: 0.31 MG/DL (ref 0.2–1)
BUN SERPL-MCNC: 34 MG/DL (ref 5–25)
CALCIUM SERPL-MCNC: 9.3 MG/DL (ref 8.4–10.2)
CHLORIDE SERPL-SCNC: 103 MMOL/L (ref 96–108)
CO2 SERPL-SCNC: 25 MMOL/L (ref 21–32)
CREAT SERPL-MCNC: 1.3 MG/DL (ref 0.6–1.3)
CRP SERPL QL: 3.5 MG/L
EOSINOPHIL # BLD AUTO: 0.03 THOUSAND/ÂΜL (ref 0–0.61)
EOSINOPHIL NFR BLD AUTO: 1 % (ref 0–6)
ERYTHROCYTE [DISTWIDTH] IN BLOOD BY AUTOMATED COUNT: 14.6 % (ref 11.6–15.1)
ERYTHROCYTE [SEDIMENTATION RATE] IN BLOOD: 20 MM/HOUR (ref 0–19)
GLUCOSE P FAST SERPL-MCNC: 79 MG/DL (ref 65–99)
HCT VFR BLD AUTO: 34.9 % (ref 36.5–46.1)
HGB BLD-MCNC: 11.2 G/DL (ref 12–15.4)
IMM GRANULOCYTES # BLD AUTO: 0.06 THOUSAND/UL (ref 0–0.2)
IMM GRANULOCYTES NFR BLD AUTO: 1 % (ref 0–2)
LDH SERPL-CCNC: 207 U/L (ref 140–271)
LIPASE SERPL-CCNC: 35 U/L (ref 11–82)
LYMPHOCYTES # BLD AUTO: 0.41 THOUSANDS/ÂΜL (ref 0.6–4.47)
LYMPHOCYTES NFR BLD AUTO: 7 % (ref 14–44)
MCH RBC QN AUTO: 32.3 PG (ref 26.8–34.3)
MCHC RBC AUTO-ENTMCNC: 32.1 G/DL (ref 31.4–37.4)
MCV RBC AUTO: 101 FL (ref 82–98)
MONOCYTES # BLD AUTO: 0.68 THOUSAND/ÂΜL (ref 0.17–1.22)
MONOCYTES NFR BLD AUTO: 12 % (ref 4–12)
NEUTROPHILS # BLD AUTO: 4.46 THOUSANDS/ÂΜL (ref 1.85–7.62)
NEUTS SEG NFR BLD AUTO: 79 % (ref 43–75)
NRBC BLD AUTO-RTO: 0 /100 WBCS
PLATELET # BLD AUTO: 227 THOUSANDS/UL (ref 149–390)
PMV BLD AUTO: 8.6 FL (ref 8.9–12.7)
POTASSIUM SERPL-SCNC: 3.7 MMOL/L (ref 3.5–5.3)
PROT SERPL-MCNC: 6.8 G/DL (ref 6.4–8.4)
RBC # BLD AUTO: 3.47 MILLION/UL (ref 3.88–5.12)
SODIUM SERPL-SCNC: 136 MMOL/L (ref 135–147)
T3FREE SERPL-MCNC: 3.35 PG/ML (ref 2.5–3.9)
T4 FREE SERPL-MCNC: 0.77 NG/DL (ref 0.61–1.12)
TSH SERPL DL<=0.05 MIU/L-ACNC: 16.34 UIU/ML (ref 0.45–4.5)
WBC # BLD AUTO: 5.65 THOUSAND/UL (ref 4.31–10.16)

## 2024-08-24 PROCEDURE — 84481 FREE ASSAY (FT-3): CPT

## 2024-08-24 PROCEDURE — 86140 C-REACTIVE PROTEIN: CPT

## 2024-08-24 PROCEDURE — 36415 COLL VENOUS BLD VENIPUNCTURE: CPT

## 2024-08-24 PROCEDURE — 84439 ASSAY OF FREE THYROXINE: CPT

## 2024-08-24 PROCEDURE — 83615 LACTATE (LD) (LDH) ENZYME: CPT

## 2024-08-24 PROCEDURE — 82024 ASSAY OF ACTH: CPT

## 2024-08-24 PROCEDURE — 85652 RBC SED RATE AUTOMATED: CPT

## 2024-08-24 PROCEDURE — 85025 COMPLETE CBC W/AUTO DIFF WBC: CPT

## 2024-08-24 PROCEDURE — 80053 COMPREHEN METABOLIC PANEL: CPT

## 2024-08-24 PROCEDURE — 84443 ASSAY THYROID STIM HORMONE: CPT

## 2024-08-24 PROCEDURE — 83690 ASSAY OF LIPASE: CPT

## 2024-08-26 ENCOUNTER — TRANSITIONAL CARE MANAGEMENT (OUTPATIENT)
Dept: INTERNAL MEDICINE CLINIC | Facility: CLINIC | Age: 74
End: 2024-08-26

## 2024-08-26 ENCOUNTER — HOSPITAL ENCOUNTER (OUTPATIENT)
Dept: INFUSION CENTER | Facility: HOSPITAL | Age: 74
Discharge: HOME | End: 2024-08-26
Attending: INTERNAL MEDICINE
Payer: MEDICARE

## 2024-08-26 VITALS
TEMPERATURE: 98.7 F | WEIGHT: 106.26 LBS | DIASTOLIC BLOOD PRESSURE: 80 MMHG | HEART RATE: 75 BPM | SYSTOLIC BLOOD PRESSURE: 141 MMHG | OXYGEN SATURATION: 100 % | BODY MASS INDEX: 19.44 KG/M2 | RESPIRATION RATE: 18 BRPM

## 2024-08-26 DIAGNOSIS — G93.6 VASOGENIC BRAIN EDEMA (HCC): ICD-10-CM

## 2024-08-26 DIAGNOSIS — C34.11 MALIGNANT NEOPLASM OF UPPER LOBE, RIGHT BRONCHUS OR LUNG (HCC): ICD-10-CM

## 2024-08-26 NOTE — TELEPHONE ENCOUNTER
Chemo will be held today  Patient will see Dr. Castro on 9/3/24  Infusion RN reported patient fell and fractured her Left elbow and has an injury to her right ankle  She is seeing ortho on 8/30/24  Will reschedule chemo after office visit on 9/3/24

## 2024-08-26 NOTE — PROGRESS NOTES
[facility]  Consult Note    Nithya Anderson Patient Status:  Emergency    1993 MRN [id number]   Location [facility] EMERGENCY Attending Anna Lentz MD   Hosp Day # 0 PCP Faith Cosme MD     Reason for Consult:  Persistent chest pain    HPI:  28-year-old female with recent COVID 19 pneumonia, chest pain who presents to the ED with persistent chest pain.  -Patient initially presented with symptoms beginning of December.  She was ultimately admitted on .  She is unvaccinated and presented with COVID 19 pneumonia with CT scan demonstrating a moderate to severe COVID burden  -She was given Decadron and remdesivir and ultimately discharged  -She then represented to the ED at the end of December with chest pain was found to have a mildly downtrending troponin  -Echo at that time demonstrated preserved ventricular function with no evidence of pericardial effusion and EKG was normal.  -I ultimately saw her in clinic and recommended colchicine and a cardiac MRI.  She states that the colchicine made her sick pain worse and she is discontinued and has been maintained on ibuprofen.  MRI is to be scheduled  -Patient now presents with persistent worsening symptoms.  Reports pleuritic chest pain worse with lying flat better with sitting forward    Pertinent Labs / Studies:    Troponin: 44, now normal from 112 on 2021    CXR: Improvement in airspace consolidations in the bilateral lower lung resolved with residual mild opacities     EKG: Personally reviewed, normal sinus rhythm    Telemetry: Personally reviewed, sinus rhythm    Assessment/Plan:  28-year-old female with recent COVID 19 pneumonia, chest pain who presents to the ED with persistent chest pain.    Chest pain:  -Although EKG is unimpressive, her constellation of symptoms is suggestive of a pericarditic picture.  -We will obtain echo to rule out any effusive pericarditis  -Her troponin now is normal and EKG  Pt arrived to infusion center - upon discharge from hospital on Saturday pt fell in the parking lot - [facility] dx Fx left elbow / and Fx right ankle (has an appt with ortho 8/27) - pharmacy noted that cr/cl was 26 - and TSH was 16.34 - spoke with Sharron COHEN reported above findings - and pt would like to defer treatment today till she see Dr. Castro next week - and pt would like to discuss a port - since we can only use 1 arm - office was in agreement of holding treatment today.    shows no signs of ischemia or inflammation however her pain could be secondary to resolving myocarditis  -D-dimer pending, if elevated would rule out PE with CTA and also check the level of her infiltrates as some of her symptoms may be secondary to pleurisy  -I discussed with her that it might be worthwhile to restart colchicine 0.6 mg twice daily for 3 months  -If she is unable to tolerate this then would return to high-dose NSAIDs with a PPI  -Ultimately we will plan for a cardiac MRI when available    Primary cardiologist: Cristine  Discussed with Dr. Tor Colunga M.D., F.A.C.C.  Interventional Cardiology  [facility]      =======================================================================    History:  History reviewed. No pertinent past medical history.  History reviewed. No pertinent surgical history.  Family History   Problem Relation Age of Onset   • Heart disease Father    • Heart disease Maternal Aunt       reports that she has never smoked. She has never used smokeless tobacco. She reports previous alcohol use. She reports that she does not use drugs.    Allergies:  ALLERGIES:   Allergen Reactions   • Ciprofloxacin Other (See Comments)     Blurred vision with eye drops       Medications:  No current facility-administered medications for this encounter.     Current Outpatient Medications   Medication Sig   • colchicine (COLCRYS) 0.6 MG tablet Take 1 tablet by mouth 2 times daily.        Review of Systems:  A comprehensive 13 point review of systems was negative, except those mentioned above in the HPI.    Physical Exam:  BP (!) 126/95 (BP Location: LUE - Left upper extremity, Patient Position: Sitting)   Pulse 71   Temp 98.3 °F (36.8 °C) (Oral)   Resp 16   Ht 5' 7\" (1.702 m)   Wt 86.4 kg (190 lb 7.6 oz)   LMP 01/11/2022   BMI 29.83 kg/m²   BSA 1.98 m²     Gen: no acute distress, AOx3  Head: Atraumatic, normocephalic  Neck: Supple, no JVP, no carotid bruit  CV:  RRR, S1, S2, No G/R/M   Chest: Lungs BCTA, non-labored respirations   Abdomen: Nontender, nondistended  Musculoskeletal: No abnormalities or deformity  Neuro: No focal deficits, AOx3  Ext: warm, well perfused, no LE edema  Psych: Cooperative, calm    Laboratory Data:    Recent Labs   Lab 01/14/22  0813   SODIUM 139   POTASSIUM 3.9   CHLORIDE 106   CO2 25   BUN 19   CREATININE 0.79   GLUCOSE 111*   CALCIUM 9.2   WBC 7.7   RBC 4.77   HGB 13.2   HCT 39.6          No results for input(s): RAPDTR, LACTA, PT, INR, BNP in the last 72 hours.

## 2024-08-27 ENCOUNTER — OFFICE VISIT (OUTPATIENT)
Dept: OBGYN CLINIC | Facility: CLINIC | Age: 74
End: 2024-08-27
Payer: MEDICARE

## 2024-08-27 VITALS — BODY MASS INDEX: 19.51 KG/M2 | HEIGHT: 62 IN | WEIGHT: 106 LBS

## 2024-08-27 DIAGNOSIS — S92.154A CLOSED NONDISPLACED AVULSION FRACTURE OF RIGHT TALUS, INITIAL ENCOUNTER: ICD-10-CM

## 2024-08-27 DIAGNOSIS — S52.125A CLOSED NONDISPLACED FRACTURE OF HEAD OF LEFT RADIUS, INITIAL ENCOUNTER: Primary | ICD-10-CM

## 2024-08-27 LAB — ACTH PLAS-MCNC: 12.2 PG/ML (ref 7.2–63.3)

## 2024-08-27 PROCEDURE — 99203 OFFICE O/P NEW LOW 30 MIN: CPT | Performed by: ORTHOPAEDIC SURGERY

## 2024-08-27 RX ORDER — PANTOPRAZOLE SODIUM 40 MG/1
40 TABLET, DELAYED RELEASE ORAL DAILY
Qty: 90 TABLET | Refills: 2 | Status: SHIPPED | OUTPATIENT
Start: 2024-08-27 | End: 2024-08-28 | Stop reason: ALTCHOICE

## 2024-08-27 NOTE — PROGRESS NOTES
Assessment/Plan:  1. Closed nondisplaced fracture of head of left radius, initial encounter        2. Closed nondisplaced avulsion fracture of right talus, initial encounter          Scribe Attestation    I,:  Lacey Jersonkurt am acting as a scribe while in the presence of the attending physician.:       I,:  Xu Guevara MD personally performed the services described in this documentation    as scribed in my presence.:           73 y.o. female who presents for initial evaluation of right ankle and left elbow after a fall 8/24/24. Upon review of the x-rays, a thorough history and my examination, Ayla is presenting with signs and symptoms consistent with left nondisplaced radial head fracture and small avulsion fracture to the lateral talar dome. In regards to her right ankle, ankle is stable to stress on exam and patient is not having any pain with ambulation she may continue activities as tolerated. Patient has excellent range of motion to the elbow but does have pain with pronation/supination. Recommend no heavy lifting, pushing, or pulling with left upper extremity at this time. Follow up in 6 weeks with repeat elbow x-ray on arrival.           Subjective: Initial evaluation     Ayla Nye is a 73 y.o. adult who presents for initial evaluation of left elbow and right ankle. Patient fell 8/24/24. She had just been discharged from the [facility] and did not wish to return so [facility]. She notes no pain to the ankle, no discomfort or instability with ambulation. She has mild aching pain to the elbow, aggravated with motion and lifting, alleviated with rest. Denies any numbness or paresthesias.       Review of Systems   Constitutional:  Negative for chills and fever.   HENT:  Negative for ear pain and sore throat.    Eyes:  Negative for pain and visual disturbance.   Respiratory:  Negative for cough and shortness of  breath.    Cardiovascular:  Negative for chest pain and palpitations.   Gastrointestinal:  Negative for abdominal pain and vomiting.   Genitourinary:  Negative for dysuria and hematuria.   Musculoskeletal:  Negative for arthralgias and back pain.   Skin:  Negative for color change and rash.   Neurological:  Negative for seizures and syncope.   All other systems reviewed and are negative.        Past Medical History:   Diagnosis Date   • Allergic     Allergic to neomiacin and cephalexin   • Cataract 2023    Due to have durgery 2024   • Essential thrombocytosis (HCC)     controlled with medication   • Hyperlipidemia    • Hypertension    • Mass in chest    • Nodular goiter    • Pneumonia Oct 16, 2023    Also  2024   • Psoriasis    • SIRS (systemic inflammatory response syndrome) (HCC) 2024       Past Surgical History:   Procedure Laterality Date   • APPENDECTOMY     • BREAST CYST EXCISION Right    • COLONOSCOPY  10/31/2018   • DXA PROCEDURE (HISTORICAL)  2017   • MAMMO (HISTORICAL)      18   • MAMMO NEEDLE LOCALIZATION RIGHT (ALL INC) Right 2009   • SKIN LESION EXCISION      bridge of nose       Family History   Problem Relation Age of Onset   • Stroke Mother    • Depression Mother            • Hypertension Mother    • Hearing loss Mother    • Tuberculosis Father    • Cancer Brother         lung   • Tuberculosis Brother    • Coronary artery disease Brother    • Hypertension Brother    • No Known Problems Daughter    • No Known Problems Daughter    • No Known Problems Maternal Grandmother    • No Known Problems Maternal Grandfather    • No Known Problems Paternal Grandmother    • No Known Problems Paternal Grandfather    • No Known Problems Maternal Aunt    • No Known Problems Maternal Aunt    • No Known Problems Maternal Aunt    • No Known Problems Maternal Aunt    • No Known Problems Paternal Aunt    • Cancer Brother         Throat cancer       Social History      Occupational History   • Not on file   Tobacco Use   • Smoking status: Former     Current packs/day: 1.00     Average packs/day: 1 pack/day for 58.7 years (58.7 ttl pk-yrs)     Types: Cigarettes     Start date: 1966     Passive exposure: Past   • Smokeless tobacco: Never   • Tobacco comments:     Quit 2010   Vaping Use   • Vaping status: Never Used   Substance and Sexual Activity   • Alcohol use: Not Currently   • Drug use: Never   • Sexual activity: Not Currently     Partners: Male     Birth control/protection: Post-menopausal         Current Outpatient Medications:   •  aspirin 81 mg chewable tablet, Chew 81 mg daily., Disp: , Rfl:   •  atorvastatin (LIPITOR) 40 mg tablet, Take 1 tablet (40 mg total) by mouth every evening, Disp: 30 tablet, Rfl: 0  •  Cholecalciferol (VITAMIN D3) 5000 units TABS, Take 5,000 Units by mouth Daily, Disp: , Rfl:   •  [START ON 8/30/2024] dexamethasone (DECADRON) 1 mg tablet, Take 1 tablet (1 mg total) by mouth daily at bedtime for 3 days Do not start before August 30, 2024., Disp: 3 tablet, Rfl: 0  •  [START ON 9/3/2024] dexamethasone (DECADRON) 1 mg tablet, Take 1 tablet (1 mg total) by mouth 2 (two) times a day with meals for 3 days Do not start before September 3, 2024., Disp: 6 tablet, Rfl: 0  •  [START ON 9/7/2024] dexamethasone (DECADRON) 1 mg tablet, Take 1 tablet (1 mg total) by mouth daily with breakfast for 3 days Do not start before September 7, 2024., Disp: 3 tablet, Rfl: 0  •  dexamethasone (DECADRON) 2 mg tablet, Take 1 tablet (2 mg total) by mouth daily at bedtime for 3 days Do not start before August 24, 2024., Disp: 3 tablet, Rfl: 0  •  dexamethasone (DECADRON) 2 mg tablet, Take 1 tablet (2 mg total) by mouth 2 (two) times a day with meals for 3 days Do not start before August 27, 2024., Disp: 6 tablet, Rfl: 0  •  [START ON 8/30/2024] dexamethasone (DECADRON) 2 mg tablet, Take 1 tablet (2 mg total) by mouth daily with breakfast for 3 days Do not start before  August 30, 2024., Disp: 3 tablet, Rfl: 0  •  dexamethasone (DECADRON) 4 mg tablet, Take 1 tablet (4 mg total) by mouth daily with breakfast for 3 days Do not start before August 24, 2024., Disp: 3 tablet, Rfl: 0  •  diphenhydrAMINE (BENADRYL) 25 mg capsule, Take 25 mg by mouth every 12 (twelve) hours as needed for itching, Disp: , Rfl:   •  folic acid (FOLVITE) 1 mg tablet, Take 1 tablet (1 mg total) by mouth daily, Disp: 30 tablet, Rfl: 6  •  levETIRAcetam (Keppra) 750 mg tablet, Take 1 tablet (750 mg total) by mouth 2 (two) times a day, Disp: 60 tablet, Rfl: 0  •  pantoprazole (PROTONIX) 40 mg tablet, Take 1 tablet (40 mg total) by mouth daily, Disp: 30 tablet, Rfl: 0  •  Probiotic Product (PROBIOTIC DAILY PO), Take 1 capsule by mouth daily, Disp: , Rfl:   •  sulfamethoxazole-trimethoprim (BACTRIM DS) 800-160 mg per tablet, Take 1 tablet by mouth every 12 (twelve) hours, Disp: 60 tablet, Rfl: 0  •  vitamin B-12 (VITAMIN B-12) 1,000 mcg tablet, Take 1 tablet (1,000 mcg total) by mouth daily, Disp: 90 tablet, Rfl: 1  •  pantoprazole (PROTONIX) 40 mg tablet, Take 1 tablet (40 mg total) by mouth daily in the early morning (Patient not taking: Reported on 8/27/2024), Disp: 30 tablet, Rfl: 0    Allergies   Allergen Reactions   • Doxycycline Headache   • Keflex [Cephalexin] Rash   • Neomycin-Polymyxin-Dexameth Eye Swelling       Objective:  Vitals:       Right Ankle Exam     Tenderness   Right ankle tenderness location: lateral talus anterior to malleolus.  Swelling: none    Range of Motion   The patient has normal right ankle ROM.    Tests   Anterior drawer: negative  Varus tilt: negative    Other   Erythema: absent  Sensation: normal  Pulse: present     Comments:    Patient ambulates with steady gait pattern  No anatomical deformity  Skin is warm and dry to touch with no signs of erythema, ecchymosis, infection  (-) syndesmotic squeeze  Calf compartments are soft and supple  2+ TP and DP pulses with brisk capillary  refill to the toes  Sural, saphenous, tibial, superficial, and deep peroneal motor and sensory distributions intact  Sensation to light touch intact distally        Left Elbow Exam     Tenderness   The patient is experiencing tenderness in the radial head.     Range of Motion   The patient has normal left elbow ROM.    Tests   Varus: negative  Valgus: negative    Other   Erythema: absent  Sensation: normal  Pulse: present    Comments:  Pain with pronation/supination  Skin is warm and dry to touch with no signs of erythema, ecchymosis, or infection   no soft tissue swelling or effusion noted  Demonstrates normal shoulder, wrist, and finger motion  no radial head instability  no ulnar nerve subluxation  2+ distal radial pulse with brisk capillary refill to the fingers  Radial, median, and ulnar motor and sensory distrubution intact  Sensation to light touch intact distally               Physical Exam  Vitals and nursing note reviewed.   Constitutional:       Appearance: Normal appearance.   HENT:      Head: Normocephalic and atraumatic.      Right Ear: External ear normal.      Left Ear: External ear normal.   Eyes:      Extraocular Movements: Extraocular movements intact.      Conjunctiva/sclera: Conjunctivae normal.   Cardiovascular:      Rate and Rhythm: Normal rate.      Pulses: Normal pulses.   Pulmonary:      Effort: Pulmonary effort is normal.      Breath sounds: Normal breath sounds.   Abdominal:      General: Abdomen is flat.   Musculoskeletal:         General: Normal range of motion.      Cervical back: Normal range of motion and neck supple.      Comments: See ortho exam   Skin:     General: Skin is warm and dry.   Neurological:      General: No focal deficit present.      Mental Status: She is alert.   Psychiatric:         Mood and Affect: Mood normal.         Behavior: Behavior normal.         I have personally reviewed pertinent films in PACS and my interpretation is as follows:    X-rays of the right  ankle obtained 8/24/24 reviewed demonstrates small avulsion fracture of the lateral talus    X-rays of the left elbow demonstrates non displaced radial head fracture      This document was created using speech voice recognition software.   Grammatical errors, random word insertions, pronoun errors, and incomplete sentences are an occasional consequence of this system due to software limitations, ambient noise, and hardware issues.   Any formal questions or concerns about content, text, or information contained within the body of this dictation should be directly addressed to the provider for clarification.

## 2024-08-28 ENCOUNTER — OFFICE VISIT (OUTPATIENT)
Age: 74
End: 2024-08-28
Payer: MEDICARE

## 2024-08-28 VITALS
TEMPERATURE: 97.3 F | HEIGHT: 62 IN | BODY MASS INDEX: 19.69 KG/M2 | SYSTOLIC BLOOD PRESSURE: 112 MMHG | HEART RATE: 62 BPM | WEIGHT: 107 LBS | RESPIRATION RATE: 16 BRPM | DIASTOLIC BLOOD PRESSURE: 70 MMHG | OXYGEN SATURATION: 99 %

## 2024-08-28 DIAGNOSIS — E78.2 MIXED HYPERLIPIDEMIA: ICD-10-CM

## 2024-08-28 DIAGNOSIS — C79.31 METASTATIC CANCER TO BRAIN (HCC): ICD-10-CM

## 2024-08-28 DIAGNOSIS — M81.0 AGE-RELATED OSTEOPOROSIS WITHOUT CURRENT PATHOLOGICAL FRACTURE: ICD-10-CM

## 2024-08-28 DIAGNOSIS — Z00.00 MEDICARE ANNUAL WELLNESS VISIT, SUBSEQUENT: ICD-10-CM

## 2024-08-28 DIAGNOSIS — I10 PRIMARY HYPERTENSION: Primary | ICD-10-CM

## 2024-08-28 DIAGNOSIS — L40.50 PSORIATIC ARTHRITIS (HCC): ICD-10-CM

## 2024-08-28 PROBLEM — R65.10 SIRS (SYSTEMIC INFLAMMATORY RESPONSE SYNDROME) (HCC): Status: RESOLVED | Noted: 2024-06-22 | Resolved: 2024-08-28

## 2024-08-28 PROCEDURE — G0439 PPPS, SUBSEQ VISIT: HCPCS

## 2024-08-28 PROCEDURE — 99214 OFFICE O/P EST MOD 30 MIN: CPT

## 2024-08-28 NOTE — PROGRESS NOTES
Transition of Care Visit  Name: Ayla Nye      : 1950      MRN: [id number]  Encounter Provider: Rafy Angelo MD  Encounter Date: 2024   Encounter department: [facility] PRIMARY CARE    Assessment & Plan   {There are no diagnoses linked to this encounter. (Refresh or delete this SmartLink)}    Depression Screening and Follow-up Plan: Patient was screened for depression during today's encounter. They screened negative with a PHQ-2 score of 0.        History of Present Illness   {Disappearing Hyperlinks I Encounters * My Last Note * Since Last Visit * History :91533}  Transitional Care Management Review:   Ayla Nye is a 73 y.o. adult here for TCM follow up.     During the TCM phone call patient stated:  TCM Call     Date and time call was made  2024  1:22 PM    Hospital care reviewed  Records reviewed    Patient was hospitialized at  [facility]    Date of Admission  24    Date of discharge  24    Diagnosis  Acute Metabolic Encephalopathy    Disposition  Home    Were the patients medications reviewed and updated  Yes    Current Symptoms  Weakness; Leg pain - left side; Fatigue    Left side leg pain severity  Moderat    Weakness severity  Moderate    Fatigue severity  Mild      TCM Call     Post hospital issues  Reduced activity    Should patient be enrolled in anticoag monitoring?  No    Scheduled for follow up?  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  Patient's medical record from the hospital reviewed.      Review of Systems   Constitutional:  Positive for fatigue. Negative for chills and fever.   HENT:  Negative for congestion, ear pain, rhinorrhea, sneezing and sore throat.    Eyes:  Negative for redness, itching and visual disturbance.   Respiratory:  Negative for cough, chest tightness and shortness of breath.    Cardiovascular:  Negative for chest pain, palpitations and leg swelling.   Gastrointestinal:  Negative for abdominal pain, blood in stool, diarrhea, nausea and vomiting.   Endocrine: Negative for cold intolerance and heat intolerance.   Genitourinary:  Negative for dysuria, frequency and urgency.   Musculoskeletal:  Negative for arthralgias, back pain and myalgias.   Skin:  Negative for color change and rash.   Neurological:  Negative for dizziness, weakness, light-headedness, numbness and headaches.   Hematological:  Does not bruise/bleed easily.   Psychiatric/Behavioral:  Negative for agitation, behavioral problems and confusion.      Objective   {Disappearing Hyperlinks   Review Vitals * Enter New Vitals * Results Review * Labs * Imaging * Cardiology * Procedures * Lung Cancer Screening :53687}  /70 (BP Location: Left arm, Patient Position: Sitting, Cuff Size: Standard)   Pulse 62   Temp (!) 97.3 °F (36.3 °C) (Tympanic)   Resp 16   Ht 5' 2\" (1.575 m)   Wt 48.5 kg (107 lb)   SpO2 99%   BMI 19.57 kg/m²     Physical Exam  Vitals and nursing note reviewed.   Constitutional:       General: She is not in acute distress.     Appearance: Normal appearance. She is well-developed. She is not ill-appearing, toxic-appearing or diaphoretic.   HENT:      Head: Normocephalic and atraumatic.      Nose: Nose normal.      Mouth/Throat:      Mouth: Mucous membranes are moist.      Pharynx: Oropharynx is clear. No posterior oropharyngeal erythema.   Eyes:      General: No " scleral icterus.        Right eye: No discharge.         Left eye: No discharge.      Extraocular Movements: Extraocular movements intact.      Conjunctiva/sclera: Conjunctivae normal.      Pupils: Pupils are equal, round, and reactive to light.   Cardiovascular:      Rate and Rhythm: Normal rate and regular rhythm.      Pulses: Normal pulses.      Heart sounds: Normal heart sounds. No murmur heard.     No gallop.   Pulmonary:      Effort: Pulmonary effort is normal. No respiratory distress.      Breath sounds: Normal breath sounds. No wheezing or rales.   Abdominal:      General: Abdomen is flat. Bowel sounds are normal. There is no distension.      Palpations: Abdomen is soft.      Tenderness: There is no abdominal tenderness. There is no right CVA tenderness, left CVA tenderness or guarding.   Musculoskeletal:         General: No swelling or tenderness. Normal range of motion.      Cervical back: Normal range of motion and neck supple. No rigidity.      Right lower leg: No edema.      Left lower leg: No edema.   Lymphadenopathy:      Cervical: No cervical adenopathy.   Skin:     General: Skin is warm and dry.      Capillary Refill: Capillary refill takes 2 to 3 seconds.      Coloration: Skin is not jaundiced or pale.      Findings: No bruising.   Neurological:      General: No focal deficit present.      Mental Status: She is alert and oriented to person, place, and time. Mental status is at baseline.      Sensory: No sensory deficit.      Gait: Gait normal.   Psychiatric:         Mood and Affect: Mood normal.         Behavior: Behavior normal.       Medications have been reviewed by provider in current encounter    Administrative Statements {Disappearing Hyperlinks I  [facility]  {Time Spent Statement (Optional):75831}

## 2024-08-28 NOTE — PROGRESS NOTES
Ambulatory Visit  Name: Ayla Nye      : 1950      MRN: [id number]  Encounter Provider: Rafy Angelo MD  Encounter Date: 2024   Encounter department: [facility]    Assessment & Plan   1. Primary hypertension  Assessment & Plan:  Continue monitoring blood pressure at home.  No antihypertensive medication at this point.  Unless patient blood pressure consistently staying elevated we will not prescribe antihypertensive at this point.  We will continue to monitor.  2. Mixed hyperlipidemia  Assessment & Plan:  Under control with diet and exercise.  We will continue to monitor  3. Metastatic cancer to brain (HCC)  Assessment & Plan:  Patient has been followed by radiation oncologist and regular oncologist  4. Age-related osteoporosis without current pathological fracture  Assessment & Plan:  Under control.    Continue current medication.    We will re-evaluate at next office visit.    5. Psoriatic arthritis (HCC)  Assessment & Plan:  Under control.    Continue current medication.    We will re-evaluate at next office visit.  Patient has been followed by a dermatologist and rheumatologist  6. Medicare annual wellness visit, subsequent       Preventive health issues were discussed with patient, and age appropriate screening tests were ordered as noted in patient's After Visit Summary. Personalized health advice and appropriate referrals for health education or preventive services given if needed, as noted in patient's After Visit Summary.    History of Present Illness     Patient here for Medicare wellness and for transitional care management as patient was hospitalized on 2024 and discharged on 2024 at [facility] for acute metabolic encephalopathy which was secondary to brain metastasis but also because of rapid tapering of her Decadron.  Now patient is feeling okay.  Does have some fatigue.  Occasionally feels lightheaded.   No headache.  Does have peripheral visual disturbance which is has been there before.  No chest pain or shortness of breath.  No abdominal pain, nausea, vomiting, diarrhea or constipation.  No dizziness.  Not tingling or numbness.  No other new problem.       Patient Care Team:  Rafy Angelo MD as PCP - General (Internal Medicine)  Rosalinda Castro MD (Medical Oncology)  Honey Gamboa MD (Radiation Oncology)  Marta Muñiz as Care Coordinator    Review of Systems   Constitutional:  Positive for fatigue. Negative for chills and fever.   HENT:  Negative for congestion, ear pain, rhinorrhea, sneezing and sore throat.    Eyes:  Positive for visual disturbance. Negative for redness and itching.   Respiratory:  Negative for cough, chest tightness and shortness of breath.    Cardiovascular:  Negative for chest pain, palpitations and leg swelling.   Gastrointestinal:  Negative for abdominal pain, blood in stool, diarrhea, nausea and vomiting.   Endocrine: Negative for cold intolerance and heat intolerance.   Genitourinary:  Negative for dysuria, frequency and urgency.   Musculoskeletal:  Negative for arthralgias, back pain and myalgias.   Skin:  Negative for color change and rash.   Neurological:  Positive for light-headedness (Occasional). Negative for dizziness, weakness, numbness and headaches.   Hematological:  Does not bruise/bleed easily.   Psychiatric/Behavioral:  Negative for agitation, behavioral problems and confusion.      Medical History Reviewed by provider this encounter:  Tobacco  Allergies  Meds  Problems  Med Hx  Surg Hx  Fam Hx       Annual Wellness Visit Questionnaire   Ayla is here for her Subsequent Wellness visit.     Health Risk Assessment:   Patient rates overall health as fair. Patient feels that their physical health rating is slightly worse. Patient is satisfied with their life. Eyesight was rated as slightly worse. Hearing was rated as slightly worse. Patient feels that their  emotional and mental health rating is slightly worse. Patients states they are sometimes angry. Patient states they are sometimes unusually tired/fatigued. Pain experienced in the last 7 days has been some. Patient's pain rating has been 7/10. Patient states that she has experienced weight loss or gain in last 6 months.     Depression Screening:   PHQ-2 Score: 0      Fall Risk Screening:   In the past year, patient has experienced: history of falling in past year      Urinary Incontinence Screening:   Patient has not leaked urine accidently in the last six months.     Home Safety:  Patient does not have trouble with stairs inside or outside of their home. Patient has working smoke alarms and has working carbon monoxide detector. Home safety hazards include: none.     Nutrition:   Current diet is Regular.     Medications:   Patient is currently taking over-the-counter supplements. OTC medications include: see medication list. Patient is able to manage medications.     Activities of Daily Living (ADLs)/Instrumental Activities of Daily Living (IADLs):   Walk and transfer into and out of bed and chair?: Yes  Dress and groom yourself?: Yes    Bathe or shower yourself?: Yes    Feed yourself? Yes  Do your laundry/housekeeping?: Yes  Manage your money, pay your bills and track your expenses?: Yes  Make your own meals?: Yes    Do your own shopping?: Yes    Previous Hospitalizations:   Any hospitalizations or ED visits within the last 12 months?: Yes    How many hospitalizations have you had in the last year?: 3-4    Advance Care Planning:   Living will: Yes    Durable POA for healthcare: Yes    Advanced directive: Yes      Cognitive Screening:   Provider or family/friend/caregiver concerned regarding cognition?: No    PREVENTIVE SCREENINGS      Cardiovascular Screening:    General: Screening Not Indicated and History Lipid Disorder      Diabetes Screening:     General: Screening Current      Colorectal Cancer Screening:      General: Screening Current      Breast Cancer Screening:     General: Screening Current      Cervical Cancer Screening:    General: Screening Not Indicated      Osteoporosis Screening:    General: Screening Not Indicated and History Osteoporosis      Lung Cancer Screening:     General: Screening Not Indicated and History Lung Cancer      Hepatitis C Screening:    General: Screening Current    Screening, Brief Intervention, and Referral to Treatment (SBIRT)    Screening  Typical number of drinks in a day: 0  Typical number of drinks in a week: 0  Interpretation: Low risk drinking behavior.    AUDIT-C Screenin) How often did you have a drink containing alcohol in the past year? never  2) How many drinks did you have on a typical day when you were drinking in the past year? 0  3) How often did you have 6 or more drinks on one occasion in the past year? never    AUDIT-C Score: 0  Interpretation: Score 0-2 (female): Negative screen for alcohol misuse    Single Item Drug Screening:  How often have you used an illegal drug (including marijuana) or a prescription medication for non-medical reasons in the past year? never    Single Item Drug Screen Score: 0  Interpretation: Negative screen for possible drug use disorder    Social Determinants of Health     Financial Resource Strain: Low Risk  (2023)    Overall Financial Resource Strain (CARDIA)    • Difficulty of Paying Living Expenses: Not hard at all   Food Insecurity: No Food Insecurity (2024)    Hunger Vital Sign    • Worried About Running Out of Food in the Last Year: Never true    • Ran Out of Food in the Last Year: Never true   Transportation Needs: No Transportation Needs (2024)    PRAPARE - Transportation    • Lack of Transportation (Medical): No    • Lack of Transportation (Non-Medical): No   Housing Stability: Low Risk  (2024)    Housing Stability Vital Sign    • Unable to Pay for Housing in the Last Year: No    • Number of Times Moved in  "the Last Year: 0    • Homeless in the Last Year: No   Utilities: Not At Risk (8/28/2024)    [facility] Utilities    • Threatened with loss of utilities: No     No results found.    Objective     /70 (BP Location: Left arm, Patient Position: Sitting, Cuff Size: Standard)   Pulse 62   Temp (!) 97.3 °F (36.3 °C) (Tympanic)   Resp 16   Ht 5' 2\" (1.575 m)   Wt 48.5 kg (107 lb)   SpO2 99%   BMI 19.57 kg/m²     Physical Exam  Vitals and nursing note reviewed.   Constitutional:       General: She is not in acute distress.     Appearance: Normal appearance. She is well-developed. She is not ill-appearing, toxic-appearing or diaphoretic.   HENT:      Head: Normocephalic and atraumatic.      Nose: Nose normal.      Mouth/Throat:      Mouth: Mucous membranes are moist.      Pharynx: Oropharynx is clear. No posterior oropharyngeal erythema.   Eyes:      General: No scleral icterus.        Right eye: No discharge.         Left eye: No discharge.      Extraocular Movements: Extraocular movements intact.      Conjunctiva/sclera: Conjunctivae normal.      Pupils: Pupils are equal, round, and reactive to light.   Cardiovascular:      Rate and Rhythm: Normal rate and regular rhythm.      Pulses: Normal pulses.      Heart sounds: Normal heart sounds. No murmur heard.     No gallop.   Pulmonary:      Effort: Pulmonary effort is normal. No respiratory distress.      Breath sounds: Normal breath sounds. No wheezing or rales.   Abdominal:      General: Abdomen is flat. Bowel sounds are normal. There is no distension.      Palpations: Abdomen is soft.      Tenderness: There is no abdominal tenderness. There is no right CVA tenderness, left CVA tenderness or guarding.   Musculoskeletal:         General: No swelling or tenderness. Normal range of motion.      Cervical back: Normal range of motion and neck supple. No rigidity.      Right lower leg: No edema.      Left lower leg: No edema.   Lymphadenopathy:      Cervical: No cervical " adenopathy.   Skin:     General: Skin is warm and dry.      Capillary Refill: Capillary refill takes 2 to 3 seconds.      Coloration: Skin is not jaundiced or pale.      Findings: No bruising.   Neurological:      General: No focal deficit present.      Mental Status: She is alert and oriented to person, place, and time. Mental status is at baseline.      Sensory: No sensory deficit.      Gait: Gait normal.   Psychiatric:         Mood and Affect: Mood normal.         Behavior: Behavior normal.       TCM Call     Date and time call was made  8/26/2024  1:22 PM    Hospital care reviewed  Records reviewed    Patient was hospitialized at  [facility]    Date of Admission  08/21/24    Date of discharge  08/23/24    Diagnosis  Acute Metabolic Encephalopathy    Disposition  Home    Were the patients medications reviewed and updated  Yes    Current Symptoms  Weakness; Leg pain - left side; Fatigue    Left side leg pain severity  Moderat    Weakness severity  Moderate    Fatigue severity  Mild      TCM Call     Post hospital issues  Reduced activity    Should patient be enrolled in anticoag monitoring?  No    Scheduled for follow up?  Yes    Referrals needed  Ortho    Did you obtain your prescribed medications  Yes    Do you need help managing your prescriptions or medications  No    Is transportation to your appointment needed  No    I have advised the patient to call PCP with any new or worsening symptoms  Reba Carrion LPN    Living Arrangements  Alone    Support System  Family

## 2024-08-28 NOTE — PATIENT INSTRUCTIONS
Medicare Preventive Visit Patient Instructions  Thank you for completing your Welcome to Medicare Visit or Medicare Annual Wellness Visit today. Your next wellness visit will be due in one year (8/29/2025).  The screening/preventive services that you may require over the next 5-10 years are detailed below. Some tests may not apply to you based off risk factors and/or age. Screening tests ordered at today's visit but not completed yet may show as past due. Also, please note that scanned in results may not display below.  Preventive Screenings:  Service Recommendations Previous Testing/Comments   Colorectal Cancer Screening  * Colonoscopy    * Fecal Occult Blood Test (FOBT)/Fecal Immunochemical Test (FIT)  * Fecal DNA/Cologuard Test  * Flexible Sigmoidoscopy Age: 45-75 years old   Colonoscopy: every 10 years (may be performed more frequently if at higher risk)  OR  FOBT/FIT: every 1 year  OR  Cologuard: every 3 years  OR  Sigmoidoscopy: every 5 years  Screening may be recommended earlier than age 45 if at higher risk for colorectal cancer. Also, an individualized decision between you and your healthcare provider will decide whether screening between the ages of 76-85 would be appropriate. Colonoscopy: 08/15/2022  FOBT/FIT: Not on file  Cologuard: Not on file  Sigmoidoscopy: Not on file          Breast Cancer Screening Age: 40+ years old  Frequency: every 1-2 years  Not required if history of left and right mastectomy Mammogram: 10/25/2023        Cervical Cancer Screening Between the ages of 21-29, pap smear recommended once every 3 years.   Between the ages of 30-65, can perform pap smear with HPV co-testing every 5 years.   Recommendations may differ for women with a history of total hysterectomy, cervical cancer, or abnormal pap smears in past. Pap Smear: 09/26/2023        Hepatitis C Screening Once for adults born between 1945 and 1965  More frequently in patients at high risk for Hepatitis C Hep C Antibody:  12/31/2015        Diabetes Screening 1-2 times per year if you're at risk for diabetes or have pre-diabetes Fasting glucose: 79 mg/dL (8/24/2024)  A1C: 5.3 % (7/30/2024)      Cholesterol Screening Once every 5 years if you don't have a lipid disorder. May order more often based on risk factors. Lipid panel: 07/30/2024          Other Preventive Screenings Covered by Medicare:  Abdominal Aortic Aneurysm (AAA) Screening: covered once if your at risk. You're considered to be at risk if you have a family history of AAA.  Lung Cancer Screening: covers low dose CT scan once per year if you meet all of the following conditions: (1) Age 55-77; (2) No signs or symptoms of lung cancer; (3) Current smoker or have quit smoking within the last 15 years; (4) You have a tobacco smoking history of at least 20 pack years (packs per day multiplied by number of years you smoked); (5) You get a written order from a healthcare provider.  Glaucoma Screening: covered annually if you're considered high risk: (1) You have diabetes OR (2) Family history of glaucoma OR (3)  aged 50 and older OR (4)  American aged 65 and older  Osteoporosis Screening: covered every 2 years if you meet one of the following conditions: (1) You're estrogen deficient and at risk for osteoporosis based off medical history and other findings; (2) Have a vertebral abnormality; (3) On glucocorticoid therapy for more than 3 months; (4) Have primary hyperparathyroidism; (5) On osteoporosis medications and need to assess response to drug therapy.   Last bone density test (DXA Scan): 10/25/2023.  HIV Screening: covered annually if you're between the age of 15-65. Also covered annually if you are younger than 15 and older than 65 with risk factors for HIV infection. For pregnant patients, it is covered up to 3 times per pregnancy.    Immunizations:  Immunization Recommendations   Influenza Vaccine Annual influenza vaccination during flu season is  recommended for all persons aged >= 6 months who do not have contraindications   Pneumococcal Vaccine   * Pneumococcal conjugate vaccine = PCV13 (Prevnar 13), PCV15 (Vaxneuvance), PCV20 (Prevnar 20)  * Pneumococcal polysaccharide vaccine = PPSV23 (Pneumovax) Adults 19-63 yo with certain risk factors or if 65+ yo  If never received any pneumonia vaccine: recommend Prevnar 20 (PCV20)  Give PCV20 if previously received 1 dose of PCV13 or PPSV23   Hepatitis B Vaccine 3 dose series if at intermediate or high risk (ex: diabetes, end stage renal disease, liver disease)   Respiratory syncytial virus (RSV) Vaccine - COVERED BY MEDICARE PART D  * RSVPreF3 (Arexvy) CDC recommends that adults 60 years of age and older may receive a single dose of RSV vaccine using shared clinical decision-making (SCDM)   Tetanus (Td) Vaccine - COST NOT COVERED BY MEDICARE PART B Following completion of primary series, a booster dose should be given every 10 years to maintain immunity against tetanus. Td may also be given as tetanus wound prophylaxis.   Tdap Vaccine - COST NOT COVERED BY MEDICARE PART B Recommended at least once for all adults. For pregnant patients, recommended with each pregnancy.   Shingles Vaccine (Shingrix) - COST NOT COVERED BY MEDICARE PART B  2 shot series recommended in those 19 years and older who have or will have weakened immune systems or those 50 years and older     Health Maintenance Due:      Topic Date Due   • Breast Cancer Screening: Mammogram  10/25/2024   • DXA SCAN  10/25/2028   • Colorectal Cancer Screening  08/13/2029   • Hepatitis C Screening  Completed     Immunizations Due:      Topic Date Due   • Hepatitis A Vaccine (1 of 2 - Risk 2-dose series) Never done   • Hepatitis B Vaccine (1 of 3 - Risk 3-dose series) Never done   • COVID-19 Vaccine (5 - 2023-24 season) 09/01/2023   • Influenza Vaccine (1) 09/01/2024     Advance Directives   What are advance directives?  Advance directives are legal documents  that state your wishes and plans for medical care. These plans are made ahead of time in case you lose your ability to make decisions for yourself. Advance directives can apply to any medical decision, such as the treatments you want, and if you want to donate organs.   What are the types of advance directives?  There are many types of advance directives, and each state has rules about how to use them. You may choose a combination of any of the following:  Living will:  This is a written record of the treatment you want. You can also choose which treatments you do not want, which to limit, and which to stop at a certain time. This includes surgery, medicine, IV fluid, and tube feedings.   Durable power of  for healthcare (DPAHC):  This is a written record that states who you want to make healthcare choices for you when you are unable to make them for yourself. This person, called a proxy, is usually a family member or a friend. You may choose more than 1 proxy.  Do not resuscitate (DNR) order:  A DNR order is used in case your heart stops beating or you stop breathing. It is a request not to have certain forms of treatment, such as CPR. A DNR order may be included in other types of advance directives.  Medical directive:  This covers the care that you want if you are in a coma, near death, or unable to make decisions for yourself. You can list the treatments you want for each condition. Treatment may include pain medicine, surgery, blood transfusions, dialysis, IV or tube feedings, and a ventilator (breathing machine).  Values history:  This document has questions about your views, beliefs, and how you feel and think about life. This information can help others choose the care that you would choose.  Why are advance directives important?  An advance directive helps you control your care. Although spoken wishes may be used, it is better to have your wishes written down. Spoken wishes can be misunderstood, or  not followed. Treatments may be given even if you do not want them. An advance directive may make it easier for your family to make difficult choices about your care.       © Copyright [employer] 2018 Information is for End User's use only and may not be sold, redistributed or otherwise used for commercial purposes. All illustrations and images included in CareNotes® are the copyrighted property of [employer] or [employer]      Medicare Preventive Visit Patient Instructions  Thank you for completing your Welcome to Medicare Visit or Medicare Annual Wellness Visit today. Your next wellness visit will be due in one year (8/29/2025).  The screening/preventive services that you may require over the next 5-10 years are detailed below. Some tests may not apply to you based off risk factors and/or age. Screening tests ordered at today's visit but not completed yet may show as past due. Also, please note that scanned in results may not display below.  Preventive Screenings:  Service Recommendations Previous Testing/Comments   Colorectal Cancer Screening  * Colonoscopy    * Fecal Occult Blood Test (FOBT)/Fecal Immunochemical Test (FIT)  * Fecal DNA/Cologuard Test  * Flexible Sigmoidoscopy Age: 45-75 years old   Colonoscopy: every 10 years (may be performed more frequently if at higher risk)  OR  FOBT/FIT: every 1 year  OR  Cologuard: every 3 years  OR  Sigmoidoscopy: every 5 years  Screening may be recommended earlier than age 45 if at higher risk for colorectal cancer. Also, an individualized decision between you and your healthcare provider will decide whether screening between the ages of 76-85 would be appropriate. Colonoscopy: 08/15/2022  FOBT/FIT: Not on file  Cologuard: Not on file  Sigmoidoscopy: Not on file          Breast Cancer Screening Age: 40+ years old  Frequency: every 1-2 years  Not required if history of left and right mastectomy Mammogram: 10/25/2023        Cervical Cancer Screening Between  the ages of 21-29, pap smear recommended once every 3 years.   Between the ages of 30-65, can perform pap smear with HPV co-testing every 5 years.   Recommendations may differ for women with a history of total hysterectomy, cervical cancer, or abnormal pap smears in past. Pap Smear: 09/26/2023        Hepatitis C Screening Once for adults born between 1945 and 1965  More frequently in patients at high risk for Hepatitis C Hep C Antibody: 12/31/2015        Diabetes Screening 1-2 times per year if you're at risk for diabetes or have pre-diabetes Fasting glucose: 79 mg/dL (8/24/2024)  A1C: 5.3 % (7/30/2024)      Cholesterol Screening Once every 5 years if you don't have a lipid disorder. May order more often based on risk factors. Lipid panel: 07/30/2024          Other Preventive Screenings Covered by Medicare:  Abdominal Aortic Aneurysm (AAA) Screening: covered once if your at risk. You're considered to be at risk if you have a family history of AAA.  Lung Cancer Screening: covers low dose CT scan once per year if you meet all of the following conditions: (1) Age 55-77; (2) No signs or symptoms of lung cancer; (3) Current smoker or have quit smoking within the last 15 years; (4) You have a tobacco smoking history of at least 20 pack years (packs per day multiplied by number of years you smoked); (5) You get a written order from a healthcare provider.  Glaucoma Screening: covered annually if you're considered high risk: (1) You have diabetes OR (2) Family history of glaucoma OR (3)  aged 50 and older OR (4)  American aged 65 and older  Osteoporosis Screening: covered every 2 years if you meet one of the following conditions: (1) You're estrogen deficient and at risk for osteoporosis based off medical history and other findings; (2) Have a vertebral abnormality; (3) On glucocorticoid therapy for more than 3 months; (4) Have primary hyperparathyroidism; (5) On osteoporosis medications and need to  assess response to drug therapy.   Last bone density test (DXA Scan): 10/25/2023.  HIV Screening: covered annually if you're between the age of 15-65. Also covered annually if you are younger than 15 and older than 65 with risk factors for HIV infection. For pregnant patients, it is covered up to 3 times per pregnancy.    Immunizations:  Immunization Recommendations   Influenza Vaccine Annual influenza vaccination during flu season is recommended for all persons aged >= 6 months who do not have contraindications   Pneumococcal Vaccine   * Pneumococcal conjugate vaccine = PCV13 (Prevnar 13), PCV15 (Vaxneuvance), PCV20 (Prevnar 20)  * Pneumococcal polysaccharide vaccine = PPSV23 (Pneumovax) Adults 19-63 yo with certain risk factors or if 65+ yo  If never received any pneumonia vaccine: recommend Prevnar 20 (PCV20)  Give PCV20 if previously received 1 dose of PCV13 or PPSV23   Hepatitis B Vaccine 3 dose series if at intermediate or high risk (ex: diabetes, end stage renal disease, liver disease)   Respiratory syncytial virus (RSV) Vaccine - COVERED BY MEDICARE PART D  * RSVPreF3 (Arexvy) CDC recommends that adults 60 years of age and older may receive a single dose of RSV vaccine using shared clinical decision-making (SCDM)   Tetanus (Td) Vaccine - COST NOT COVERED BY MEDICARE PART B Following completion of primary series, a booster dose should be given every 10 years to maintain immunity against tetanus. Td may also be given as tetanus wound prophylaxis.   Tdap Vaccine - COST NOT COVERED BY MEDICARE PART B Recommended at least once for all adults. For pregnant patients, recommended with each pregnancy.   Shingles Vaccine (Shingrix) - COST NOT COVERED BY MEDICARE PART B  2 shot series recommended in those 19 years and older who have or will have weakened immune systems or those 50 years and older     Health Maintenance Due:      Topic Date Due   • Breast Cancer Screening: Mammogram  10/25/2024   • DXA SCAN  10/25/2028    • Colorectal Cancer Screening  08/13/2029   • Hepatitis C Screening  Completed     Immunizations Due:      Topic Date Due   • Hepatitis A Vaccine (1 of 2 - Risk 2-dose series) Never done   • Hepatitis B Vaccine (1 of 3 - Risk 3-dose series) Never done   • COVID-19 Vaccine (5 - 2023-24 season) 09/01/2023   • Influenza Vaccine (1) 09/01/2024     Advance Directives   What are advance directives?  Advance directives are legal documents that state your wishes and plans for medical care. These plans are made ahead of time in case you lose your ability to make decisions for yourself. Advance directives can apply to any medical decision, such as the treatments you want, and if you want to donate organs.   What are the types of advance directives?  There are many types of advance directives, and each state has rules about how to use them. You may choose a combination of any of the following:  Living will:  This is a written record of the treatment you want. You can also choose which treatments you do not want, which to limit, and which to stop at a certain time. This includes surgery, medicine, IV fluid, and tube feedings.   Durable power of  for healthcare (DPAHC):  This is a written record that states who you want to make healthcare choices for you when you are unable to make them for yourself. This person, called a proxy, is usually a family member or a friend. You may choose more than 1 proxy.  Do not resuscitate (DNR) order:  A DNR order is used in case your heart stops beating or you stop breathing. It is a request not to have certain forms of treatment, such as CPR. A DNR order may be included in other types of advance directives.  Medical directive:  This covers the care that you want if you are in a coma, near death, or unable to make decisions for yourself. You can list the treatments you want for each condition. Treatment may include pain medicine, surgery, blood transfusions, dialysis, IV or tube  feedings, and a ventilator (breathing machine).  Values history:  This document has questions about your views, beliefs, and how you feel and think about life. This information can help others choose the care that you would choose.  Why are advance directives important?  An advance directive helps you control your care. Although spoken wishes may be used, it is better to have your wishes written down. Spoken wishes can be misunderstood, or not followed. Treatments may be given even if you do not want them. An advance directive may make it easier for your family to make difficult choices about your care.       © Copyright [employer] 2018 Information is for End User's use only and may not be sold, redistributed or otherwise used for commercial purposes. All illustrations and images included in CareNotes® are the copyrighted property of [employer] or [employer]

## 2024-08-29 ENCOUNTER — TELEPHONE (OUTPATIENT)
Age: 74
End: 2024-08-29

## 2024-08-29 RX ORDER — PANTOPRAZOLE SODIUM 40 MG/1
TABLET, DELAYED RELEASE ORAL
Qty: 30 TABLET | Refills: 5 | Status: SHIPPED | OUTPATIENT
Start: 2024-08-29

## 2024-08-29 NOTE — PROGRESS NOTES
HEMATOLOGY / ONCOLOGY CLINIC FOLLOW UP NOTE    Patient Ayla Nye  MRN: [id number]  : 1950  Date of Encounter 9/3/2024           Reason for Encounter: follow up ET and new lung mass on CRT C6 2024     C1 2024  C2  2024  C3 2024  C4 2024  C5 2024  C6 given 2024 at [place] f/b hospitalization for neutropenic fever     First line metastatic treatment     Carboplatin AUC 5 Pemetrexed 500 mg/m2 and Pembrolizumab 200 mg IV every 3 weeks x 4 cycles     C1 -held due to admission    C1 to be given  2024        Oncology History   Malignant neoplasm of upper lobe, right bronchus or lung (HCC)    Initial Diagnosis    Primary lung adenocarcinoma, right (HCC)     3/26/2024 Biopsy    A-C. Lymph Node, Level 4R :    - Metastatic non-small cell carcinoma, most compatible with a primary lung adenocarcinoma; see note.       D-F. Lymph Node, Level 10R:    - Metastatic non-small cell carcinoma; see note.       G-I. Lymph Node, Level 4L:    - Metastatic non-small cell carcinoma; see note.       4/15/2024 -  Cancer Staged    Staging form: Lung, AJCC 8th Edition  - Clinical stage from 4/15/2024: Stage IIIB (cT2b, cN3, cM0) - Signed by Rogelio Beebe DO on 4/15/2024       2024 - 2024 Chemotherapy    alteplase (CATHFLO), 2 mg, Intracatheter, Every 1 Minute as needed, 6 of 6 cycles  CARBOplatin (PARAPLATIN) IVPB (GOG AUC DOSING), 130.4 mg, Intravenous, Once, 6 of 6 cycles  Administration: 130.4 mg (2024), 144.8 mg (2024), 138.4 mg (2024), 144.8 mg (2024), 132 mg (2024), 143.6 mg (2024)  PACLItaxel (TAXOL) chemo IVPB, 45 mg/m2 = 67.2 mg (90 % of original dose 50 mg/m2), Intravenous, Once, 6 of 6 cycles  Dose modification: 45 mg/m2 (original dose 50 mg/m2, Cycle 1, Reason: Anticipated Tolerance)  Administration: 67.2 mg (2024), 67.2 mg (2024), 67.2 mg (2024), 67.2 mg (2024), 67.2 mg (2024), 67.2 mg (2024)      5/23/2024 - 7/5/2024 Radiation    Treatment:  Course: C1    Plan ID Energy Fractions Dose per Fraction (cGy) Dose Correction (cGy) Total Dose Delivered (cGy) Elapsed Days   R Lung_Hilum 6X 30 / 30 200 0 6,000 43      Treatment dates:  C1: 5/23/2024 - 7/5/2024 8/26/2024 -  Chemotherapy    cyanocobalamin, 1,000 mcg, Intramuscular, Once, 1 of 3 cycles  Administration: 1,000 mcg (8/19/2024)  alteplase (CATHFLO), 2 mg, Intracatheter, Every 1 Minute as needed, 0 of 6 cycles  fosaprepitant (EMEND) IVPB, 150 mg, Intravenous, Once, 0 of 6 cycles  CARBOplatin (PARAPLATIN) IVPB (GOG AUC DOSING), 525 mg, Intravenous, Once, 0 of 6 cycles  pemetrexed (ALIMTA) chemo infusion, 500 mg/m2 = 735 mg, Intravenous, Once, 0 of 6 cycles  pembrolizumab (KEYTRUDA) IVPB, 200 mg, Intravenous, Once, 0 of 5 cycles         Discussion      cT2b N3 M0 Stage IIIB adenocarcinoma lung     Caris  Kras C12V, p53  PDL1 TPS 5%  TMB 16 mut/Mb   Other  mutations negative      The optimal therapy of resectable Stage III NSCLC consists of systemic treatment combined with local approach with radiation and/or surgery.  Strategies include surgery followed by adjuvant chemotherapy  neoadjuvant chemotherapy or chemoimmunotherapy followed by surgery, neoadjuvant chemoradiotherapy followed by surgery or concurrent or sequential chemotherapy with definitive radiation therapy.      The accepted standard remains concurrent chemoradiotherapy although this is being challenged     The potential benefit of adding surgery to combined chemoradiotherapy for stage IIIA disease was noted in the Intergroup 0139 trial.  T1-3N2 NSCLC were assigned to concurrent chemorads 45 Gy with two cycles of Cisplatin/Etoposide followed by either surgery of continued chemorads.  PFS was longer in the surgery arm with no OS difference.      The two chemotherapy regimens that have been most commonly used in the [place] are the combination of cisplatin and etoposide and weekly  "carboplatin and paclitaxel:     Concurrent cisplatin (50 mg/m2 on days 1, 8, 29, and 36) plus etoposide (50 mg/m2 daily on days 1 to 5, and 29 to 33) with thoracic RT followed by two cycles of cisplatin plus etoposide was evaluated in a multicenter phase II trial of 50 patients with pathologically confirmed stage IIIB disease  With an average follow-up of 52 months, the three- and five-year survival rates were 17 and 15 percent, respectively. Treatment was complicated by grade 4 neutropenia in 32 percent and grade 3 or 4 esophagitis in 20 percent of patients. In a subsequent phase II trial, better results were seen with the same concurrent chemoradiation regimen followed by docetaxel consolidation, but the use of consolidation chemotherapy in this setting has been called into question by subsequent data       In a randomized phase II trial, carboplatin (area under the curve [AUC] = 2) and paclitaxel (45 mg/m2) given weekly with radiotherapy (63 Gy) followed by two cycles of consolidation therapy (carboplatin AUC = 6, paclitaxel 200 mg/m2) resulted in a median survival of 16.3 months.      Thus, for chemoradiation, the choices are cisplatin plus etoposide, in conjunction with once daily RT to a total of 60 Gy. An alternative \"radiosensitizing chemotherapy\" approach uses weekly carboplatin plus paclitaxel with approximately 60 Gy of radiation, followed by two cycles of consolidation with this same chemotherapy combination at standard systemic doses. The combination of pemetrexed and platinum agents has emerged as another acceptable alternative for stage III patients with nonsquamous cell histology.      Based on preclinical evidence demonstrating synergy between RT and IO, the Phase III [facility] trial used a PDL1 inhibitor Durvalumab after concurrent CRT in a randomized study involving 713 patients.  After median follow up of 34 months giving drug every 2 weeks for 12 months as consolidation, the use of durvalumab " significantly improved PFR from 5.6 to 16.9 months and led to a significant improvement in OS with HR 0.72.  The incidence of new mets was also lower including brain mets.      Grade 3 or 4 adverse events occurred in 31 percent of the patients who received durvalumab and 26 percent of those who received placebo, with the most common severe adverse event being pneumonia. Adverse events of any grade that were of special interest, regardless of cause, were reported in 66 percent of patients receiving durvalumab versus 49 percent of those receiving placebo, most of which were grade 1 or 2. Such events for durvalumab versus placebo included diarrhea (18 versus 19 percent), pneumonitis (13 versus 8 percent), rash (12 versus 7 percent), and pruritus (12 versus 5 percent). Adverse events of special interest for which patients received concomitant treatment were reported in 42 versus 17 of patients receiving durvalumab and placebo, respectively, with treatments typically including steroids, endocrine agents, and occasionally other immunosuppressive agents. Patient-reported quality of life was comparable between the two groups      These results demonstrate efficacy and tolerability of durvalumab for treatment of unresectable stage III NSCLC in patients who experience an objective response or stable disease following completion of chemoradiotherapy.     For this patient weekly Carboplatin AUC 2 and Taxol 45 mg/m2 weekly x 6 weeks with RT to 60-63 Gy will be used.  IO therapy, based on response, will be considered per the [place] trial as above     Metastatic Disease        Metastatic NSCLC/adenocarcinoma  An improved understanding of the molecular pathways that drive malignancy in NSCLC has led to the development of agents that target specific molecular pathways in malignant cells. These agents have been a significant step forward in the treatment of patients whose tumors contain specific mutations in these pathways.  For  this patient, Caris testing is pending     For those with tumor PD-L1 expression of 50 percent or higher, pembrolizumab or atezolizumab monotherapy is given; this  has demonstrated improvement in overall survival (OS) compared with doublet chemotherapy alone in this population. However, the combination of chemotherapy and pembrolizumab is preferred for those with rapidly progressive disease and is an acceptable alternative for others.     For patients with PD-L1 expression of less than 50 percent, we favor a combination of doublet chemotherapy with concurrent pembrolizumab. Although pembrolizumab monotherapy is a [place] Food and Drug Administration (FDA)-approved option for patients with PD-L1 expression of 1 to 49 percent, a chemotherapy/pembrolizumab combination is preferred when feasible. In the randomized trial leading to this approval, results demonstrating superiority of pembrolizumab monotherapy over chemotherapy among patients with tumor PD-L1 expression of 1 percent or greater were driven by the subset of patients with high tumor PD-L1 expression (50 percent or higher).     Cisplatin-based regimens are slightly more effective than carboplatin-based combinations or nonplatinum regimens, but are associated with increased nonhematologic toxicity. Given that treatment is palliative in this setting, we opt for a carboplatin-based regimen for the majority of patients who will receive a chemotherapy doublet. Pemetrexed-based regimens should only be used in patients with nonsquamous histology. When combination chemotherapy is the initial treatment, it generally is limited to four to six cycles.      For patients with nonsquamous NSCLC and PD-L1 expression <50 percent,  the standard is the combination of pemetrexed, carboplatin or cisplatin, and pembrolizumab. This regimen has demonstrated improvements in multiple efficacy endpoints, including OS, relative to pemetrexed and carboplatin alone     Alternative options for  patients with nonsquamous NSCLC and PD-L1 expression <50 percent are as follows:  The platinum-based doublet may instead be combined with bevacizumab, which increases progression-free survival (PFS) and OS relative to chemotherapy alone. A choice among these options takes into account patient and provider preferences, given that head-to-head comparisons of platinum combinations with either bevacizumab or pembrolizumab are not available. However, the magnitude of PFS improvement is more impressive with pembrolizumab than in the bevacizumab trials.     -The combination of platinum-based doublet chemotherapy, bevacizumab, and atezolizumab is another potential alternative, and this regimen now has regulatory approval for patients without an epidermal growth factor receptor (EGFR) mutation or anaplastic lymphoma kinase (ALK) translocation.     For patients with squamous NSCLC and PD-L1 expression <50 percent,  carboplatin with either paclitaxel or nabpaclitaxel, and pembrolizumab, based on the demonstrated survival benefit of this regimen over chemotherapy alone for patients with advanced, squamous NSCLC.      Nivolumab plus ipilimumab is another FDA-approved option for patients with PD-L1 expression ?1 percent; nivolumab and ipilimumab is also approved with two cycles of platinum-based chemotherapy in tumors, irrespective of PD-L1 expression.           Patient now with brain mets after completion of CRT ; SRS completed 8th August 2024           Assessment/Plan:     Ms. 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  She has been tolerating Hydrea 500 mg once daily Monday through Friday and off on Saturday and Sunday. Patient also with 4.1 cm RUL mass 1.5cm spiculated posterior nodule mediastinal adenopathy, no metastatic disease including brain MRI new diagnosis      ET:      Previously treated with hydroxyurea (HYDREA) 500 mg capsule     Plts 498 >  "434 > 388 (holding hydroxyurea since 5/23/2024 2/2 drop in platelets from chemotherapy)     Plts 166 (7/4/2024) s/p C6 chemo, continuing to hold hydroxyurea, recheck labs and will plan to restart once labs show thrombocytosis.     Does not need to be restarted on hydrea; labs from 8/1/2024 were 192     Plts 8/24/2024 were 227      NSCLC/adenocarcinoma; mutational analysis pending      Now with RUL 4.2 cm mass with mediastinal adenopathy at least cT2a cN2 cM0 lung cancer     cT2b N3 Stage IIIB lung      Please see above regarding discussion      Carboplatin AUC 2 Taxol 45 mg/m2 weekly with RT     Durvalumab adjuvant post treatment depending on response     Neuropathy has resolved after last cycle of Taxol.     Esophagitis appearing better after completing radiation.     7/1/2024     Was admitted 6/22-6/24/2024 for neutropenic fever     WBC 2.5 ANC 1810 Hgb 9.0 plts 240 (hx ET/off hydrea)     Continue with C6 7/2/2024; last radiation 7/5/2024       7/15/2024     Completed C6 on 7/2/2024 and last radiation 7/5/2024.  Was admitted for neutropenic fever after last cycle of chemo.  Discharged on 7/4/2024 and has been asymptomatic since.  Labs at discharge showed WBC 1.5, ANC 1317 and Plt 166.     Repeat CT PET 8/26/2024, radiology read from CT CAP in ED showed \"progression\" of disease but this study was performed during chemoradiation, so it is confounded by the inability to distinguish disease from inflammation.  Will  treatment response on repeat CT PET.     Recheck labs to confirm neutropenia has resolved.     Hold Otezla for psoriasis until neutropenia has resolved, but would like to restart as soon as safe as psoriasis is rebounding.     8/16/2024     Was admitted for new onset brain mets 7/30/2024; on decadron taper down to 1 mg daily next week     S/p SRS 6 fractions completed 8 August 2024 at [place]     Had modest response to treatment per PET in terms of the lung     At issue clearly are the new brain " lesions 2.0x.4 left occiput as well as multiple other smaller lesions as below     In addition PET with new SUV 9.9 mass in the right anorectal fat/right ischial tuberosity 2.7x2 which is painful to patient and feels is growing     Will get MRI pelvis; will probably need biopsy.  Less likely abscess and more likely disease but unusual place for a NSCLC to metastasize to     Will start Carboplatin/Pemetrexed/Pembrolizumab and had long discussion regarding this      IgG deficiency     IgG mildly low at 571.  This is not significantly decreased enough to cause the recurrent infections.  Will continue to monitor on subsequent labs.  No need for intervention at this time.        - IgG, IgA, IgM;levels intermittently drawn     Hypothyroid    TSH 16.3 free T4 0.77 prior to any IO therapy    Will start 50 mcg daily synthroid, adjust in 6 weeks as needed       Follow up     Follow up 2 weeks     She will be treated at [place]           History of present illness:  Initial Visit 4/10/2024     Ayla Nye is a 73-year-old female with past medical history of hypertension, psoriatic arthritis, CKD stage III, hyperlipidemia, and latent TB.  She is seen in follow-up for essential thrombocytosis.  She has had an elevated platelet count dating back to January 2028 all of her other cell lines were within normal limits.  Her highest platelet count was around 700,000.  Myeloproliferative work-up demonstrated positive JAK2 mutation and she was started on 500 mg of Hydrea daily in July 2020.  Due to leukopenia her dose was reduced to Monday, Wednesday, Friday.  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  Then again in July we had to decrease her dose and she is taking 500 mg once daily Monday through Friday and not taking any on Saturday or Sunday.      She has been tolerating the 500 mg of Hydrea Monday through Friday off Saturday and Sunday well without any side effects.  She was seen by my attending and underwent further workup of her recurrent infections and was found to have a mildly low IgG level.   She has had pneumonia twice once in October and then again in February.  She states that she has had multiple imaging of her chest which does suggest scarring which prompted a CT scan of her chest .  .  She is also had recurrent sinus infections.  She is a former smoker and quit 15 years ago bit was 1[[d x more than 30 years.        She does not have any continued symptoms of pneumonia and no cough, shortness of breath, or fevers.       She underwent the following testing; EBUS with pulmonary as well as CT chest/PET scan     Lung mass on CT chest 2/28/2024     LUNGS:     -4.1 x 3.4 cm solid mass in the anterior right upper lobe (series 302, image 73).     -Posterior to this mass, there is a 1.5 x 1.5 cm spiculated nodule (series 302, image 78)     -2.5 x 2.1 cm groundglass nodule in the anterior left upper lobe (series 302, image 90)     Mild emphysema.     Right apical pleural-parenchymal scarring.     PLEURA: Small calcified left posterior pleural plaque, which may be from prior asbestos exposure or the sequela of old inflammation.     HEART/GREAT VESSELS: Normal heart size. Mild-moderate coronary artery calcification. Mild to moderate mitral annular calcification. Trace pericardial effusion. No thoracic aortic aneurysm.     MEDIASTINUM AND SUDEEP: 1.6 x 2.0 cm right lower paratracheal/precarinal lymph node. 1.3 x 1.3 cm right lower paratracheal lymph node.        CT PET  3/28/2024     Intensely FDG avid right upper lobe mass, SUV max of 18. This abuts the anterior pleural margin. Central photopenia suggest necrosis. Mass measures on the order of 4.2 cm in size, stable previously 4.1 cm.     Subjacent 1.5 cm spiculated nodule posteriorly demonstrates minimal  uptake, SUV max of 1.7.     Minimal FDG uptake in the left upper lobe groundglass nodule, SUV max of 2.2. This measured up to 2.5 cm in size on recent CT, appears grossly stable on low-dose CT.     A few FDG-avid mediastinal lymph nodes. Right anterior paratracheal lymph node demonstrates SUV max of 13. This measures up to 1.9 cm short axis image 85 series 3, may be slightly larger previously 1.6 cm.     A right perihilar lymph node demonstrates SUV max of 12. This measures on the order of 1.2 cm short axis image 89 series 3.  CT images: Scattered emphysematous changes of the lung fields. Mild to moderate coronary artery calcifications. Minimal left pleural calcification. Small calcified lymph nodes in the left perihilar region        3/26/2024     -C. Lymph Node, Level 4R (ThinPrep, smears and cell block preparations):    - Metastatic non-small cell carcinoma, most compatible with a primary lung adenocarcinoma; see note.    - Satisfactory for evaluation.     D-F. Lymph Node, Level 10R (ThinPrep, smears and cell block preparations):    - Metastatic non-small cell carcinoma; see note.    - Satisfactory for evaluation.     G-I. Lymph Node, Level 4L (ThinPrep, smears and cell block preparations):    - Metastatic non-small cell carcinoma; see note.    - Satisfactory for evaluation.      7/3/2024    CT CHEST, ABDOMEN AND PELVIS WITH IV CONTRAST     INDICATION: hx of lung cancer, fever, cough. Fever (102.9 degrees Fahrenheit), cough, recently had chemotherapy.     COMPARISON: February 28, 2024     TECHNIQUE: CT examination of the chest, abdomen and pelvis was performed. Multiplanar 2D reformatted images were created from the source data.     This examination, like all CT scans performed in the [facility], was performed utilizing techniques to minimize radiation dose exposure, including the use of iterative reconstruction and automated exposure control. Radiation dose length   product (DLP) for this visit:  314 mGy-cm     IV Contrast: 100 mL of iohexol (OMNIPAQUE)  Enteric Contrast: Not administered.     FINDINGS:     CHEST     LUNGS: There is an approximately 4.6 x 4.8 x 3.3 cm mass within the anteromedial aspect of the right upper lobe of the lung. This mass abuts the anterior and anteromedial pleural surface and the right hand aspect of the upper mediastinum. The mass   demonstrates peripheral spiculation and areas of low density centrally, suggesting areas of necrosis. Just posterolateral to this mass there is a spiculated satellite lesion measuring approximately 1.6 cm. There is also a spiculated satellite lesion   anterior and inferior to the dominant mass, measuring approximately 1.4 cm and abutting the anterior pleural surface (series 302 image 91); this satellite mass appears new compared with February 28, 2024. An ill-defined groundglass opacity in the left   upper lobe of the lung measures approximately 2.5 cm, similar to the prior study.     There is mild to moderate emphysema. There is mild bibasilar scarring versus atelectasis. Right apical scarring unchanged.     PLEURA: Left pleural calcifications unchanged. No pleural effusions. No pneumothorax.     HEART/GREAT VESSELS: Coronary artery disease. There is atherosclerosis of the thoracic aorta. No thoracic aortic aneurysm.     MEDIASTINUM AND SUDEEP: There is a 2.4 x 2.2 cm right paratracheal node demonstrating low density centrally suggesting necrosis; this is larger compared with February 28, 2024. Additionally, there is a 2.8 x 2 cm precarinal node demonstrating low density   centrally suggesting necrosis, also larger compared to the prior study. Necrotic appearing right hilar adenopathy measuring up to 2 cm.     CHEST WALL AND LOWER NECK: Tiny thyroid nodules, measuring 6 mm or less. Incidental discovery of one or more thyroid nodule(s) measuring less than 1.5 cm and without suspicious features is noted in this patient who is above 35 years old;  according to   guidelines published in the February 2015 white paper on incidental thyroid nodules in the Journal of the American College of Radiology (JACR), no further evaluation is recommended.     AInterval History:  6/3/2024     Ms Nye is tolerating treatment fairly well but did have a reaction to Taxol at C2.  She has flushing and chest pain; drug was stopped she was given Benadryl/steroids/fluids and started at slower rate.  She has not had issues with N/V but with constipation.  Her joints are more painful.  She is eating but taste is an issue, probably due to Carboplatin.  She denies any GERD but has increased cough which is from CRT.  She has no SOB.  We discussed Mucinex to help cut the phlegm generated from treatment.  Her weight is 110 pounds which is slightly decreased; her BP is 118/64. She has no other issues          Interval History:  6/17/2024     Doing well; in week 5.  Has esophagitis and carafate slurry  was prescribed; she has not started it.  No N/V, has minimal numbness mostly feet.  Had issue with veins and extravasation fluid left lower arm.  Some reddness, healing, no infection.  Labs ok plts 388 WBC 5.9 Hgb 9.4.  States psoriasis is better.  Her weight is decreased to 104 but she is eating but less.       Interval History:  7/1/2024 6/22/2024 admitted due to neutropenic  fever, rigors.  She also endorsed fatigue which was post chemo.  In the ED, she was found to be fulfilling the SIRS criteria given her low white count, she was empirically started on broad-spectrum antibiotic.  On the following day antibiotics were stopped as she continued to improve.  During her hospitalization, she also developed diarrhea which was to be noninfective in origin.  She also received her scheduled radiation therapy  Discharged 6/24/2024; chemotherapy was held for neutropenia     She is doing better today; very anxious to stop treatment this week.  Feeling improved from admission.      Labs  "improved as above WBC 2.5 ANC 1810 plts 240 Na 141 k 4/1 Cr 0.85 ALT/AST 8/10      Interval History:  7/15/2024   Completed C6 on 7/2/2024 at [place] and completed radiation on 7/5/2024.  Was admitted at [facility] for neutropenic fever after last cycle of chemo.  Discharged on 7/4/2024 and has been asymptomatic since.  CT CAP showed radiology read of \"progression\" of disease, but this scan was taken during chemoradiation and cannot distinguish between disease and inflammation.  Labs at discharge showed WBC 1.5, ANC 1317 and Plt 166.  Denied fever/chills, night sweats, adenopathy, or weight loss.  Endorsed worsening of psoriasis.  Neuropathy has resolved after last cycle of chemo.  Continues off hydroxyurea 2/2 normal platelet counts and continues off Otezla for psoriasis.            Interval History:  8/16/2024     Patient completed CRT in early July 2024, was admitted for NF and on that scan there appeared to be increase in the RUL mass but again had just finished concurrent treatment with significant inflammation     On 30th July , patient early in the day was well, mowing grass and then noted loss of balance, disorientation, confusion.  She also had vision \"flashes\" in the right field.  She was seen at [place] as below:     Admission 7/30/2024    status post concurrent weekly Taxol/carboplatin with radiation therapy on May 2024 and finished on 7/5/2024, the patient had change in mental status, imbalance, nausea admitted to the hospital on 7/29/2024, MRI of the brain showed multiple supratentorial and infratentorial enhancing lesion associated with vasogenic edema the largest lesion in the left occipital lobe measuring 2 x 1.4 cm with adjacent edema    Treatment options from a radiation therapy standpoint include SRS/SRT or whole brain radiation ± hippocampal avoidance. Given the small size and relatively small number of metastases, SRS would be the preferred treatment approach. I discussed with Dr. Norwood " with consensus for SRS.     Possible short- and long-term side effects inclide but are not limited to, fatigue, headache, nausea, alopecia, vomiting, injury to the cranial nerves and/or visual structures including the optic nerve and chiasm, a low risk of neurocognitive effects, neurologic deficits, radionecrosis which may require treatment with steroids or surgical resection, hearing loss, stroke-like symptoms, and a low risk of radiation-induced secondary malignancy. Even with appropriate treatment, there is a risk of progression.  One some occasions, additional/other tumors may develop requiring re-evaluation.        MRI Brain 7/30/2024     1. Multiple supratentorial and infratentorial enhancing lesions with associated vasogenic edema, the largest of which is in the left occipital lobe. Findings are most consistent with metastases.     2. No acute infarct or midline shift.     She had the PET scan done after discharge as below:        PET scan 8/5/2024     Persistent hypermetabolic medial right upper lobe lung mass measuring 3.3 x 2.7 cm on image 132 of series 3 with max SUV of 10.0, previously measuring 3.7 x 2.9 cm with max SUV of 18.0 when utilizing similar measurement technique.     On image 132 of series 3 there is a stable adjacent 7 mm right upper lobe lung nodule with max SUV of 2.1, previously similar in size with max SUV of 1.7     Improving hypermetabolic right paratracheal mediastinal and right hilar yaw metastatic disease. For example, on image 137, hypermetabolic right paratracheal lymph node measures 7 mm in short axis with max SUV of 3.7, previously approximately 1.3 cm   with max SUV of 13.0.     Stable mildly FDG avid semisolid groundglass nodular opacity involving the left upper lobe measuring 2.6 x 1.1 cm on image 142 of series 3 with max SUV of 1.9, previously similar in size with max SUV of 2.2.  CT images: Atherosclerotic vascular calcifications including those of the coronary arteries  are noted. Emphysematous changes.     1. New hypermetabolic soft tissue mass involving the right anorectal fat medial to the right ischial tuberosity. While this finding could reflect hypermetabolic metastatic disease related to patient's primary bronchogenic carcinoma the distribution is   atypical for metastatic disease from bronchogenic carcinoma. Therefore, consider further evaluation with tissue sampling to exclude secondary hypermetabolic malignancy.     2. Persistent hypermetabolic right upper lobe bronchogenic carcinoma with hypermetabolic mediastinal and right hilar yaw metastatic disease which overall is improved since prior PET/CT.     3. Hypermetabolic left occipital lobe metastatic disease. Patient's known intracranial metastatic disease was better characterized on prior MRI of the brain.     Today, she completed SRS at [place] and is on a steroid taper; 2 mg daily decadron and will go to 1 mg daily decadron next week.  Completed RT 8 August 2024.  Doing well, no confusion, ambulating without issue.  Is on Keppra which she will remain on; 500 mg bid given.  Decadron will continue for now and probably for another additional week due to potential recall inflammation with Pembrolizumab.  She will get first line treatment in the metastatic setting with Carboplatin/Pemetrexed/Pembrolizumab.  Would like to start in one week.  Consent obtained.  Concern with IO causing more inflammation in the brain  as crosses BBB and thus may continue on 1 mg decadron which is equivalent to 5 mg prednisone and thus will not impede efficacy of IO therapy     Does have new right ischial tuberosity lesion on PET which she is having pain.  Will get MRI and may need RT for pain palliation but unclear if this is metastatic disease vs new primary as unusual for lung to spread in this fashion.     She has no fevers, bowel issues/changes to suggest abscess.            Interval History:  9/3/2024        Admitted 8//21-25 for  increased vasogenic edema from too quick steroid taper.  Will have to do slower taper; hold IO until at 2 mg decadron     Current on 1 mg 3x this week and then 1 mg for one day next week then off.  Did have hairline fracture elbow due to fall after discharge.     Had an MRI right pelvis 8/30/2024 due to increasing pain and lesion noted on PET which is larger and has more pain    Soft tissue mass in the right ischioanal fat measuring 3.8 x 2.6 x 3.4 cm (5/33, 2/11), corresponding to the FDG avid lesion seen on prior PET/CT. It has hyperintense T2 weighted and hypointense T1-weighted signal with thick nodular   and irregular enhancement predominantly along its periphery with hypoenhancement at the center. Lesion size and has increased in size from prior PET CT dated 8/5/2024 at which time it measured 2.7 x 2.0 cm and CT study dated 7/3/2024 at which time it   measured 0.9 x 0.7 cm. Overall findings are suspicious for a malignant lesion, likely metastasis from patient's cancer.    It is very unlikely resectable, most likely due to her NSCLC but will attempt biopsy.    Will speak with Dr Gamboa and see if can get palliative radiation to control pain.  Is 3.8x2.6x3.4 cm noted on PET as well.  Was there in June and was 0.9x0.7 cm and 8/5/2024 was 2.7x2.0    She has not felt confused back on decadron and am concerned with stopping decadron    She will now be treated in the C2 slot on 16th Sept 2024 with her first cycle of Carboplatin/Alimta/Pembrolizumab    Her TSH baseline was elevated at 16.4 with free T4 0.77 and thus will start synthroid 50 mcg daily repeat TFTs in 6 weeks       REVIEW OF SYSTEMS:  As above   Please note that a 14-point review of systems was performed to include Constitutional, HEENT, Respiratory, CVS, GI, , Musculoskeletal, Integumentary, Neurologic, Rheumatologic, Endocrinologic, Psychiatric, Lymphatic, and Hematologic/Oncologic systems were reviewed and are negative unless otherwise stated in HPI.  Positive and negative findings pertinent to this evaluation are incorporated into the history of present illness.      ECOG PS: 1    PROBLEM LIST:  Patient Active Problem List   Diagnosis    TB lung, latent    Psoriatic arthritis (HCC)    Essential thrombocytosis (HCC)    Hypertension    Mixed hyperlipidemia    Encounter for monitoring of hydroxyurea therapy    JAK2 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    Age-related osteoporosis without current pathological fracture    Malignant neoplasm of upper lobe, right bronchus or lung (HCC)    Bilateral leg pain    Insomnia    Moderate protein-calorie malnutrition (HCC)    Dehydration    Visual disturbance    Metastatic cancer to brain (HCC)    Brain mass    Malignant neoplasm metastatic to brain (HCC)    Acute metabolic encephalopathy       Past Medical History:   has a past medical history of Allergic (2022), Cataract (Nov. 2023), Essential thrombocytosis (HCC), Hyperlipidemia, Hypertension, Mass in chest, Nodular goiter, Pneumonia (Oct 16, 2023), Psoriasis, and SIRS (systemic inflammatory response syndrome) (HCC) (06/22/2024).    PAST SURGICAL HISTORY:   has a past surgical history that includes Appendectomy; Mammo needle localization right (all inc) (Right, 07/13/2009); Skin lesion excision; Colonoscopy (10/31/2018); Mammo (historical); DXA procedure(historical) (04/21/2017); and Breast cyst excision (Right, 2009).    CURRENT MEDICATIONS  Current Outpatient Medications   Medication Sig Dispense Refill    aspirin 81 mg chewable tablet Chew 81 mg daily.      Cholecalciferol (VITAMIN D3) 5000 units TABS Take 5,000 Units by mouth Daily      [START ON 8/30/2024] dexamethasone (DECADRON) 1 mg tablet Take 1 tablet (1 mg total) by mouth daily at bedtime for 3 days Do not start before August 30, 2024. 3 tablet 0    [START ON 9/3/2024] dexamethasone (DECADRON) 1 mg tablet Take 1 tablet (1 mg total) by mouth 2 (two) times a day with meals for 3 days Do not start before September 3, 2024. 6 tablet 0     [START ON 9/7/2024] dexamethasone (DECADRON) 1 mg tablet Take 1 tablet (1 mg total) by mouth daily with breakfast for 3 days Do not start before September 7, 2024. 3 tablet 0    dexamethasone (DECADRON) 2 mg tablet Take 1 tablet (2 mg total) by mouth 2 (two) times a day with meals for 3 days Do not start before August 27, 2024. 6 tablet 0    [START ON 8/30/2024] dexamethasone (DECADRON) 2 mg tablet Take 1 tablet (2 mg total) by mouth daily with breakfast for 3 days Do not start before August 30, 2024. 3 tablet 0    diphenhydrAMINE (BENADRYL) 25 mg capsule Take 25 mg by mouth every 12 (twelve) hours as needed for itching      folic acid (FOLVITE) 1 mg tablet Take 1 tablet (1 mg total) by mouth daily 30 tablet 6    levETIRAcetam (Keppra) 750 mg tablet Take 1 tablet (750 mg total) by mouth 2 (two) times a day 60 tablet 0    pantoprazole (PROTONIX) 40 mg tablet TAKE 1 TABLET(40 MG) BY MOUTH DAILY EARLY MORNING 30 tablet 5    Probiotic Product (PROBIOTIC DAILY PO) Take 1 capsule by mouth daily      sulfamethoxazole-trimethoprim (BACTRIM DS) 800-160 mg per tablet Take 1 tablet by mouth every 12 (twelve) hours 60 tablet 0    vitamin B-12 (VITAMIN B-12) 1,000 mcg tablet Take 1 tablet (1,000 mcg total) by mouth daily 90 tablet 1     No current facility-administered medications for this visit.     [unfilled]    SOCIAL HISTORY:   reports that she has quit smoking. Her smoking use included cigarettes. She started smoking about 58 years ago. She has a 58.7 pack-year smoking history. She has been exposed to tobacco smoke. She has never used smokeless tobacco. She reports that she does not currently use alcohol. She reports that she does not use drugs.     FAMILY HISTORY:  family history includes Cancer in her brother and brother; Coronary artery disease in her brother; Depression in her mother; Hearing loss in her mother; Hypertension in her brother and mother; No Known Problems in her daughter, daughter, maternal aunt, maternal  aunt, maternal aunt, maternal aunt, maternal grandfather, maternal grandmother, paternal aunt, paternal grandfather, and paternal grandmother; Stroke in her mother; Tuberculosis in her brother and father.     ALLERGIES:  is allergic to doxycycline, keflex [cephalexin], and neomycin-polymyxin-dexameth.      Physical Exam:  Vital Signs:   Visit Vitals  OB Status Postmenopausal   Smoking Status Former     There is no height or weight on file to calculate BMI.  There is no height or weight on file to calculate BSA.    GEN: Alert, awake oriented x3, in no acute distress  HEENT- No pallor, icterus, cyanosis, no oral mucosal lesions,   LAD - no palpable cervical, clavicle, axillary, inguinal LAD  Heart- normal S1 S2, regular rate and rhythm, No murmur, rubs.   Lungs- clear breathing sound bilateral.   Abdomen- soft, Non tender, bowel sounds present  Extremities- No cyanosis, clubbing, edema  Neuro- No focal neurological deficit    Labs:  Lab Results   Component Value Date    WBC 5.65 08/24/2024    HGB 11.2 (L) 08/24/2024    HCT 34.9 (L) 08/24/2024     (H) 08/24/2024     08/24/2024     Lab Results   Component Value Date    SODIUM 136 08/24/2024    K 3.7 08/24/2024     08/24/2024    CO2 25 08/24/2024    AGAP 8 08/24/2024    BUN 34 (H) 08/24/2024    CREATININE 1.30 08/24/2024    GLUC 93 08/23/2024    GLUF 79 08/24/2024    CALCIUM 9.3 08/24/2024    AST 12 08/24/2024    ALT 10 08/24/2024    ALKPHOS 42 08/24/2024    TP 6.8 08/24/2024    TBILI 0.31 08/24/2024    EGFR 69 07/31/2024           I spent 40 minutes on chart review, face to face counseling time, coordination of care and documentation.    Rosalinda Castro MD PhD

## 2024-08-30 ENCOUNTER — HOSPITAL ENCOUNTER (OUTPATIENT)
Dept: RADIOLOGY | Facility: HOSPITAL | Age: 74
Discharge: HOME | End: 2024-08-30
Payer: MEDICARE

## 2024-08-30 DIAGNOSIS — C34.91 NSCLC OF RIGHT LUNG (HCC): ICD-10-CM

## 2024-08-30 PROCEDURE — 72197 MRI PELVIS W/O & W/DYE: CPT

## 2024-08-30 PROCEDURE — A9585 GADOBUTROL INJECTION: HCPCS

## 2024-08-30 RX ORDER — GADOBUTROL 604.72 MG/ML
4 INJECTION INTRAVENOUS
Status: COMPLETED | OUTPATIENT
Start: 2024-08-30 | End: 2024-08-30

## 2024-08-30 RX ADMIN — GADOBUTROL 4 ML: 604.72 INJECTION INTRAVENOUS at 07:16

## 2024-09-03 ENCOUNTER — OFFICE VISIT (OUTPATIENT)
Dept: HEMATOLOGY ONCOLOGY | Facility: CLINIC | Age: 74
End: 2024-09-03
Payer: MEDICARE

## 2024-09-03 VITALS
HEART RATE: 72 BPM | BODY MASS INDEX: 19.78 KG/M2 | DIASTOLIC BLOOD PRESSURE: 68 MMHG | HEIGHT: 62 IN | TEMPERATURE: 97 F | SYSTOLIC BLOOD PRESSURE: 122 MMHG | OXYGEN SATURATION: 100 % | WEIGHT: 107.5 LBS | RESPIRATION RATE: 17 BRPM

## 2024-09-03 DIAGNOSIS — E03.9 HYPOTHYROIDISM, UNSPECIFIED TYPE: Primary | ICD-10-CM

## 2024-09-03 DIAGNOSIS — C34.11 MALIGNANT NEOPLASM OF UPPER LOBE, RIGHT BRONCHUS OR LUNG (HCC): ICD-10-CM

## 2024-09-03 PROCEDURE — 99215 OFFICE O/P EST HI 40 MIN: CPT | Performed by: INTERNAL MEDICINE

## 2024-09-03 RX ORDER — LEVOTHYROXINE SODIUM 50 UG/1
50 TABLET ORAL DAILY
Qty: 30 TABLET | Refills: 1 | Status: SHIPPED | OUTPATIENT
Start: 2024-09-03 | End: 2024-09-12

## 2024-09-03 NOTE — PATIENT INSTRUCTIONS
Follow up 2 weeks 17 Sept    Start synthroid 50 mcg daily-    Will schedule biopsy right hip/buttock area    Labs prior to treatment scheduled 16 Sept 2024

## 2024-09-04 ENCOUNTER — TELEPHONE (OUTPATIENT)
Dept: HEMATOLOGY ONCOLOGY | Facility: CLINIC | Age: 74
End: 2024-09-04

## 2024-09-04 NOTE — TELEPHONE ENCOUNTER
FRITZ for patient that her f/u appt. With Dr. Castro is on 9/17/24. If she is unable to keep that appt. She is to call the [facility] to reschedule.

## 2024-09-05 ENCOUNTER — TELEPHONE (OUTPATIENT)
Dept: HEMATOLOGY ONCOLOGY | Facility: CLINIC | Age: 74
End: 2024-09-05

## 2024-09-05 DIAGNOSIS — C79.31 METASTATIC CANCER TO BRAIN (HCC): ICD-10-CM

## 2024-09-06 DIAGNOSIS — C34.11 MALIGNANT NEOPLASM OF UPPER LOBE, RIGHT BRONCHUS OR LUNG (HCC): Primary | ICD-10-CM

## 2024-09-06 RX ORDER — SODIUM CHLORIDE 9 MG/ML
75 INJECTION, SOLUTION INTRAVENOUS CONTINUOUS
OUTPATIENT
Start: 2024-09-06

## 2024-09-06 RX ORDER — LEVETIRACETAM 750 MG/1
750 TABLET ORAL 2 TIMES DAILY
Qty: 60 TABLET | Refills: 0 | Status: SHIPPED | OUTPATIENT
Start: 2024-09-06 | End: 2024-10-06

## 2024-09-06 RX ORDER — OXYCODONE HYDROCHLORIDE 5 MG/1
5 TABLET ORAL EVERY 4 HOURS PRN
Qty: 40 TABLET | Refills: 0 | Status: SHIPPED | OUTPATIENT
Start: 2024-09-06

## 2024-09-06 NOTE — TELEPHONE ENCOUNTER
This was last filled by Rosalinda Castro MD as noted in the previous screen shots and in the patient's chart. This medication is not prescribed by her PCP.

## 2024-09-09 ENCOUNTER — APPOINTMENT (EMERGENCY)
Dept: CT IMAGING | Facility: HOSPITAL | Age: 74
DRG: 987 | End: 2024-09-09
Payer: MEDICARE

## 2024-09-09 ENCOUNTER — HOSPITAL ENCOUNTER (INPATIENT)
Facility: HOSPITAL | Age: 74
LOS: 2 days | Discharge: HOME | DRG: 987 | End: 2024-09-12
Attending: EMERGENCY MEDICINE | Admitting: INTERNAL MEDICINE
Payer: MEDICARE

## 2024-09-09 ENCOUNTER — TELEPHONE (OUTPATIENT)
Dept: HEMATOLOGY ONCOLOGY | Facility: CLINIC | Age: 74
End: 2024-09-09

## 2024-09-09 DIAGNOSIS — C79.31 METASTATIC CANCER TO BRAIN (HCC): ICD-10-CM

## 2024-09-09 DIAGNOSIS — R41.0 CONFUSION: Primary | ICD-10-CM

## 2024-09-09 DIAGNOSIS — C34.11 MALIGNANT NEOPLASM OF UPPER LOBE, RIGHT BRONCHUS OR LUNG (HCC): Primary | ICD-10-CM

## 2024-09-09 DIAGNOSIS — C34.11 MALIGNANT NEOPLASM OF UPPER LOBE, RIGHT BRONCHUS OR LUNG (HCC): ICD-10-CM

## 2024-09-09 DIAGNOSIS — G93.6 VASOGENIC CEREBRAL EDEMA (HCC): ICD-10-CM

## 2024-09-09 DIAGNOSIS — C79.31 MALIGNANT NEOPLASM METASTATIC TO BRAIN (HCC): ICD-10-CM

## 2024-09-09 DIAGNOSIS — G89.3 CANCER RELATED PAIN: ICD-10-CM

## 2024-09-09 PROBLEM — E03.9 HYPOTHYROIDISM: Status: ACTIVE | Noted: 2024-09-09

## 2024-09-09 PROBLEM — R90.89 ABNORMAL IMAGING OF CENTRAL NERVOUS SYSTEM: Status: ACTIVE | Noted: 2024-09-09

## 2024-09-09 PROBLEM — R41.82 ALTERED MENTAL STATUS: Status: ACTIVE | Noted: 2024-09-09

## 2024-09-09 LAB
ALBUMIN SERPL BCG-MCNC: 4.1 G/DL (ref 3.5–5)
ALP SERPL-CCNC: 47 U/L (ref 34–104)
ALT SERPL W P-5'-P-CCNC: 25 U/L (ref 7–52)
ANION GAP SERPL CALCULATED.3IONS-SCNC: 9 MMOL/L (ref 4–13)
APTT PPP: 25 SECONDS (ref 23–34)
AST SERPL W P-5'-P-CCNC: 22 U/L (ref 13–39)
BASOPHILS # BLD AUTO: 0.01 THOUSANDS/ÂΜL (ref 0–0.1)
BASOPHILS NFR BLD AUTO: 0 % (ref 0–1)
BILIRUB SERPL-MCNC: 0.46 MG/DL (ref 0.2–1)
BILIRUB UR QL STRIP: NEGATIVE
BUN SERPL-MCNC: 15 MG/DL (ref 5–25)
CALCIUM SERPL-MCNC: 9.3 MG/DL (ref 8.4–10.2)
CHLORIDE SERPL-SCNC: 99 MMOL/L (ref 96–108)
CLARITY UR: CLEAR
CO2 SERPL-SCNC: 27 MMOL/L (ref 21–32)
COLOR UR: COLORLESS
CREAT SERPL-MCNC: 0.89 MG/DL (ref 0.6–1.3)
EOSINOPHIL # BLD AUTO: 0.02 THOUSAND/ÂΜL (ref 0–0.61)
EOSINOPHIL NFR BLD AUTO: 0 % (ref 0–6)
ERYTHROCYTE [DISTWIDTH] IN BLOOD BY AUTOMATED COUNT: 14.4 % (ref 11.6–15.1)
GFR SERPL CREATININE-BSD FRML MDRD: 64 ML/MIN/1.73SQ M
GLUCOSE SERPL-MCNC: 92 MG/DL (ref 65–140)
GLUCOSE UR STRIP-MCNC: NEGATIVE MG/DL
HCT VFR BLD AUTO: 34.4 % (ref 36.5–46.1)
HGB BLD-MCNC: 11.3 G/DL (ref 12–15.4)
HGB UR QL STRIP.AUTO: NEGATIVE
IMM GRANULOCYTES # BLD AUTO: 0.07 THOUSAND/UL (ref 0–0.2)
IMM GRANULOCYTES NFR BLD AUTO: 1 % (ref 0–2)
INR PPP: 0.85 (ref 0.85–1.19)
KETONES UR STRIP-MCNC: NEGATIVE MG/DL
LEUKOCYTE ESTERASE UR QL STRIP: NEGATIVE
LYMPHOCYTES # BLD AUTO: 0.24 THOUSANDS/ÂΜL (ref 0.6–4.47)
LYMPHOCYTES NFR BLD AUTO: 4 % (ref 14–44)
MAGNESIUM SERPL-MCNC: 2 MG/DL (ref 1.9–2.7)
MCH RBC QN AUTO: 32.8 PG (ref 26.8–34.3)
MCHC RBC AUTO-ENTMCNC: 32.8 G/DL (ref 31.4–37.4)
MCV RBC AUTO: 100 FL (ref 82–98)
MONOCYTES # BLD AUTO: 0.45 THOUSAND/ÂΜL (ref 0.17–1.22)
MONOCYTES NFR BLD AUTO: 8 % (ref 4–12)
NEUTROPHILS # BLD AUTO: 5 THOUSANDS/ÂΜL (ref 1.85–7.62)
NEUTS SEG NFR BLD AUTO: 87 % (ref 43–75)
NITRITE UR QL STRIP: NEGATIVE
NRBC BLD AUTO-RTO: 0 /100 WBCS
PH UR STRIP.AUTO: 6 [PH]
PLATELET # BLD AUTO: 220 THOUSANDS/UL (ref 149–390)
PMV BLD AUTO: 8.1 FL (ref 8.9–12.7)
POTASSIUM SERPL-SCNC: 4.2 MMOL/L (ref 3.5–5.3)
PROT SERPL-MCNC: 6.7 G/DL (ref 6.4–8.4)
PROT UR STRIP-MCNC: NEGATIVE MG/DL
PROTHROMBIN TIME: 12.3 SECONDS (ref 12.3–15)
RBC # BLD AUTO: 3.44 MILLION/UL (ref 3.88–5.12)
SODIUM SERPL-SCNC: 135 MMOL/L (ref 135–147)
SP GR UR STRIP.AUTO: 1 (ref 1–1.03)
TSH SERPL DL<=0.05 MIU/L-ACNC: 2.41 UIU/ML (ref 0.45–4.5)
UROBILINOGEN UR STRIP-ACNC: <2 MG/DL
WBC # BLD AUTO: 5.79 THOUSAND/UL (ref 4.31–10.16)

## 2024-09-09 PROCEDURE — 85730 THROMBOPLASTIN TIME PARTIAL: CPT | Performed by: PHYSICIAN ASSISTANT

## 2024-09-09 PROCEDURE — 96374 THER/PROPH/DIAG INJ IV PUSH: CPT

## 2024-09-09 PROCEDURE — 36415 COLL VENOUS BLD VENIPUNCTURE: CPT | Performed by: PHYSICIAN ASSISTANT

## 2024-09-09 PROCEDURE — 70496 CT ANGIOGRAPHY HEAD: CPT

## 2024-09-09 PROCEDURE — 70498 CT ANGIOGRAPHY NECK: CPT

## 2024-09-09 PROCEDURE — 84443 ASSAY THYROID STIM HORMONE: CPT | Performed by: PHYSICIAN ASSISTANT

## 2024-09-09 PROCEDURE — 80053 COMPREHEN METABOLIC PANEL: CPT | Performed by: PHYSICIAN ASSISTANT

## 2024-09-09 PROCEDURE — 99285 EMERGENCY DEPT VISIT HI MDM: CPT | Performed by: PHYSICIAN ASSISTANT

## 2024-09-09 PROCEDURE — 83735 ASSAY OF MAGNESIUM: CPT | Performed by: PHYSICIAN ASSISTANT

## 2024-09-09 PROCEDURE — 85610 PROTHROMBIN TIME: CPT | Performed by: PHYSICIAN ASSISTANT

## 2024-09-09 PROCEDURE — 99285 EMERGENCY DEPT VISIT HI MDM: CPT

## 2024-09-09 PROCEDURE — 81003 URINALYSIS AUTO W/O SCOPE: CPT | Performed by: PHYSICIAN ASSISTANT

## 2024-09-09 PROCEDURE — 99222 1ST HOSP IP/OBS MODERATE 55: CPT | Performed by: INTERNAL MEDICINE

## 2024-09-09 PROCEDURE — 85025 COMPLETE CBC W/AUTO DIFF WBC: CPT | Performed by: PHYSICIAN ASSISTANT

## 2024-09-09 RX ORDER — SACCHAROMYCES BOULARDII 250 MG
250 CAPSULE ORAL 2 TIMES DAILY
Status: DISCONTINUED | OUTPATIENT
Start: 2024-09-09 | End: 2024-09-12 | Stop reason: HOSPADM

## 2024-09-09 RX ORDER — ASPIRIN 81 MG/1
81 TABLET, CHEWABLE ORAL DAILY
Status: DISCONTINUED | OUTPATIENT
Start: 2024-09-10 | End: 2024-09-12 | Stop reason: HOSPADM

## 2024-09-09 RX ORDER — LEVOTHYROXINE SODIUM 50 UG/1
50 TABLET ORAL
Status: DISCONTINUED | OUTPATIENT
Start: 2024-09-10 | End: 2024-09-12 | Stop reason: HOSPADM

## 2024-09-09 RX ORDER — DEXAMETHASONE SODIUM PHOSPHATE 4 MG/ML
4 INJECTION, SOLUTION INTRA-ARTICULAR; INTRALESIONAL; INTRAMUSCULAR; INTRAVENOUS; SOFT TISSUE ONCE
Status: COMPLETED | OUTPATIENT
Start: 2024-09-09 | End: 2024-09-09

## 2024-09-09 RX ORDER — FOLIC ACID 1 MG/1
1 TABLET ORAL DAILY
Status: DISCONTINUED | OUTPATIENT
Start: 2024-09-10 | End: 2024-09-12 | Stop reason: HOSPADM

## 2024-09-09 RX ORDER — DIPHENHYDRAMINE HCL 25 MG
25 TABLET ORAL EVERY 6 HOURS PRN
Status: DISCONTINUED | OUTPATIENT
Start: 2024-09-09 | End: 2024-09-12 | Stop reason: HOSPADM

## 2024-09-09 RX ORDER — DEXAMETHASONE SODIUM PHOSPHATE 4 MG/ML
4 INJECTION, SOLUTION INTRA-ARTICULAR; INTRALESIONAL; INTRAMUSCULAR; INTRAVENOUS; SOFT TISSUE EVERY 12 HOURS SCHEDULED
Status: DISCONTINUED | OUTPATIENT
Start: 2024-09-10 | End: 2024-09-12 | Stop reason: HOSPADM

## 2024-09-09 RX ORDER — LEVETIRACETAM 500 MG/1
750 TABLET ORAL 2 TIMES DAILY
Status: DISCONTINUED | OUTPATIENT
Start: 2024-09-09 | End: 2024-09-12 | Stop reason: HOSPADM

## 2024-09-09 RX ORDER — ENOXAPARIN SODIUM 100 MG/ML
40 INJECTION SUBCUTANEOUS DAILY
Status: DISCONTINUED | OUTPATIENT
Start: 2024-09-10 | End: 2024-09-11

## 2024-09-09 RX ORDER — ACETAMINOPHEN 325 MG/1
975 TABLET ORAL EVERY 6 HOURS PRN
Status: DISCONTINUED | OUTPATIENT
Start: 2024-09-09 | End: 2024-09-12 | Stop reason: HOSPADM

## 2024-09-09 RX ORDER — ACETAMINOPHEN 325 MG/1
975 TABLET ORAL ONCE
Status: COMPLETED | OUTPATIENT
Start: 2024-09-09 | End: 2024-09-09

## 2024-09-09 RX ORDER — OXYCODONE HYDROCHLORIDE 5 MG/1
5 TABLET ORAL EVERY 4 HOURS PRN
Status: DISCONTINUED | OUTPATIENT
Start: 2024-09-09 | End: 2024-09-12 | Stop reason: HOSPADM

## 2024-09-09 RX ORDER — PANTOPRAZOLE SODIUM 40 MG/1
40 TABLET, DELAYED RELEASE ORAL
Status: DISCONTINUED | OUTPATIENT
Start: 2024-09-10 | End: 2024-09-09

## 2024-09-09 RX ORDER — PANTOPRAZOLE SODIUM 40 MG/1
40 TABLET, DELAYED RELEASE ORAL
Status: DISCONTINUED | OUTPATIENT
Start: 2024-09-10 | End: 2024-09-12 | Stop reason: HOSPADM

## 2024-09-09 RX ADMIN — DEXAMETHASONE SODIUM PHOSPHATE 4 MG: 4 INJECTION INTRA-ARTICULAR; INTRALESIONAL; INTRAMUSCULAR; INTRAVENOUS; SOFT TISSUE at 16:44

## 2024-09-09 RX ADMIN — IOHEXOL 85 ML: 350 INJECTION, SOLUTION INTRAVENOUS at 15:17

## 2024-09-09 RX ADMIN — ACETAMINOPHEN 975 MG: 325 TABLET ORAL at 15:01

## 2024-09-09 RX ADMIN — ACETAMINOPHEN 975 MG: 325 TABLET, FILM COATED ORAL at 20:43

## 2024-09-09 RX ADMIN — LEVETIRACETAM 750 MG: 500 TABLET, FILM COATED ORAL at 20:42

## 2024-09-09 RX ADMIN — Medication 250 MG: at 20:42

## 2024-09-09 NOTE — ASSESSMENT & PLAN NOTE
History significant for spatial disorientation, apraxia and anosognosia localizes in the parietal lobe, can be explained with parietal lobe vasogenic edema noted in the imaging  Visual agnosia, visual field deficits on exam can be explained with abnormal imaging noted in the occipital lobe  Labs so far noncontributory to her confusion    Patient presentation and physical exam can all be explained by her imaging.  Likely from vasogenic edema surrounding the affected region of her brain.      Plan   Dexamethasone 4 mg every 12 hr  Radiation oncologist consult

## 2024-09-09 NOTE — ASSESSMENT & PLAN NOTE
CT H 9/9 : CT Brain: Vasogenic edema left parietal and left occipital lobes similar to prior study in attributed to history of brain metastasis. Mass effect on the posterior aspect of the left lateral ventricle unchanged.     CT Angiography:  Unremarkable CTA neck and brain.    See assessment and plan for the principal problem

## 2024-09-09 NOTE — TELEPHONE ENCOUNTER
"I returned call to patient today  She states she tried oxycodone over the weekend and has gotten minimal relief.  She states pain is still 6/10  Patient also reports over the past 48 hours she has become confused and disoriented.  Patient states she is \"not with it\"  I recommended that she be evaluated in the ER.  She was agreeable to this    I will place a referral for palliative care to help assist with symptom/pain management    Patient verbalized understanding   "

## 2024-09-09 NOTE — ED PROVIDER NOTES
"History  Chief Complaint   Patient presents with    Altered Mental Status     Patient having ongoing confusion for about a month, patient received radiation on her brain a month ago and states this has been ongoing since then. Has also been weaning off her steroid   States she is forgetting what she is doing and why she's doing it.       Patient is a pleasant 72 y/o female with a PMHx of HLD, HTN and metastatic lung cancer (w/ mets to the brain), presenting to the ED for evaluation of confusion and disorientation x2 days. Patient states that she has had increased confusion and forgetfulness over the past 2 days. She states that she has difficulty remembering where things are located or thinking of what she wants to say during a conversation. She was recently admitted from 8/21/24-8/23/24 for similar symptoms that were related to vasogenic edema from her brain metastases. She states that she is currently on steroids and feels that her confusion started when her steroid dose dropped to 1mg daily. She states that she had a headache throughout the day yesterday but says this resolved this morning after drinking coffee and taking Advil.  The headache was very gradual in onset and she denies any sudden onset headaches or worst headache of her life. She denies any dizziness, lightheadedness, dysarthria, facial asymmetry or unilateral numbness/weakness.  She reports right-sided peripheral vision loss of the right eye which is her baseline and denies any other new visual disturbances. She also notes that she recently started oxycodone for pain in the right gluteal region that is related to a new metastatic lesion seen on recent MRI (MRI 8/30/24: \"Enhancing soft tissue lesion in the right ischioanal fat measuring 3.8 x 2.6 x 3.4 cm\").  She denies any numbness/weakness in the lower extremities, bowel/bladder incontinence, urinary retention or saddle anesthesia.  She denies any fevers, chills, nausea, vomiting, chest pain, SOB, " abdominal pain, diarrhea, constipation or urinary symptoms.         Prior to Admission Medications   Prescriptions Last Dose Informant Patient Reported? Taking?   Cholecalciferol (VITAMIN D3) 5000 units TABS 9/9/2024 Self Yes Yes   Sig: Take 5,000 Units by mouth Daily   Probiotic Product (PROBIOTIC DAILY PO) 9/9/2024 Self Yes Yes   Sig: Take 1 capsule by mouth daily   aspirin 81 mg chewable tablet 9/9/2024  Yes Yes   Sig: Chew 81 mg daily.   dexamethasone (DECADRON) 1 mg tablet 9/9/2024  No Yes   Sig: Take 1 tablet (1 mg total) by mouth daily with breakfast for 3 days Do not start before September 7, 2024.   diphenhydrAMINE (BENADRYL) 25 mg capsule 9/8/2024 Self Yes Yes   Sig: Take 25 mg by mouth every 12 (twelve) hours as needed for itching   folic acid (FOLVITE) 1 mg tablet 9/9/2024  No Yes   Sig: Take 1 tablet (1 mg total) by mouth daily   levETIRAcetam (Keppra) 750 mg tablet 9/9/2024  No Yes   Sig: Take 1 tablet (750 mg total) by mouth 2 (two) times a day   levothyroxine (Synthroid) 50 mcg tablet 9/9/2024  No Yes   Sig: Take 1 tablet (50 mcg total) by mouth daily   oxyCODONE (Roxicodone) 5 immediate release tablet 9/9/2024  No Yes   Sig: Take 1 tablet (5 mg total) by mouth every 4 (four) hours as needed for moderate pain Max Daily Amount: 30 mg   pantoprazole (PROTONIX) 40 mg tablet 9/9/2024  No Yes   Sig: TAKE 1 TABLET(40 MG) BY MOUTH DAILY EARLY MORNING   vitamin B-12 (VITAMIN B-12) 1,000 mcg tablet 9/9/2024 Self No Yes   Sig: Take 1 tablet (1,000 mcg total) by mouth daily      Facility-Administered Medications: None       Past Medical History:   Diagnosis Date    Allergic 2022    Allergic to neomiacin and cephalexin    Cataract Nov. 2023    Due to have durgery June 2024    Essential thrombocytosis (HCC)     controlled with medication    Hyperlipidemia     Hypertension     Mass in chest     Nodular goiter     Pneumonia Oct 16, 2023    Also  Feb 2024    Psoriasis     SIRS (systemic inflammatory response  syndrome) ([facility]) 2024       Past Surgical History:   Procedure Laterality Date    APPENDECTOMY      BREAST CYST EXCISION Right     COLONOSCOPY  10/31/2018    DXA PROCEDURE (HISTORICAL)  2017    MAMMO (HISTORICAL)      8-24-18    MAMMO NEEDLE LOCALIZATION RIGHT (ALL INC) Right 2009    SKIN LESION EXCISION      bridge of nose       Family History   Problem Relation Age of Onset    Stroke Mother     Depression Mother             Hypertension Mother     Hearing loss Mother     Tuberculosis Father     Cancer Brother         lung    Tuberculosis Brother     Coronary artery disease Brother     Hypertension Brother     No Known Problems Daughter     No Known Problems Daughter     No Known Problems Maternal Grandmother     No Known Problems Maternal Grandfather     No Known Problems Paternal Grandmother     No Known Problems Paternal Grandfather     No Known Problems Maternal Aunt     No Known Problems Maternal Aunt     No Known Problems Maternal Aunt     No Known Problems Maternal Aunt     No Known Problems Paternal Aunt     Cancer Brother         Throat cancer     I have reviewed and agree with the history as documented.    E-Cigarette/Vaping    E-Cigarette Use Never User      E-Cigarette/Vaping Substances    Nicotine No     THC No     CBD No     Flavoring No     Other No     Unknown No      Social History     Tobacco Use    Smoking status: Former     Current packs/day: 1.00     Average packs/day: 1 pack/day for 58.7 years (58.7 ttl pk-yrs)     Types: Cigarettes     Start date:      Passive exposure: Past    Smokeless tobacco: Never    Tobacco comments:     Quit 2010   Vaping Use    Vaping status: Never Used   Substance Use Topics    Alcohol use: Not Currently    Drug use: Never       Review of Systems   Constitutional:  Negative for chills and fever.   HENT:  Negative for congestion, rhinorrhea and sore throat.    Eyes:  Positive for visual disturbance (known visual field deficit in right  inferolateral visual field).   Respiratory:  Negative for cough and shortness of breath.    Cardiovascular:  Negative for chest pain, palpitations and leg swelling.   Gastrointestinal:  Negative for abdominal pain, constipation, diarrhea, nausea and vomiting.   Genitourinary:  Negative for dysuria, flank pain, frequency and hematuria.   Musculoskeletal:  Positive for back pain (right low back). Negative for neck pain.   Skin:  Negative for rash.   Neurological:  Positive for headaches. Negative for dizziness, syncope, weakness and numbness.   Psychiatric/Behavioral:  Positive for confusion.    All other systems reviewed and are negative.      Physical Exam  Physical Exam  Vitals and nursing note reviewed.   Constitutional:       General: She is awake.      Appearance: Normal appearance. She is well-developed. She is not toxic-appearing or diaphoretic.   HENT:      Head: Normocephalic and atraumatic.      Right Ear: External ear normal.      Left Ear: External ear normal.      Nose: Nose normal.      Mouth/Throat:      Lips: Pink.      Mouth: Mucous membranes are moist.   Eyes:      General: Lids are normal. No scleral icterus.     Conjunctiva/sclera: Conjunctivae normal.      Pupils: Pupils are equal, round, and reactive to light.   Cardiovascular:      Rate and Rhythm: Normal rate and regular rhythm.      Pulses: Normal pulses.           Radial pulses are 2+ on the right side and 2+ on the left side.      Heart sounds: Normal heart sounds, S1 normal and S2 normal.   Pulmonary:      Effort: Pulmonary effort is normal. No accessory muscle usage.      Breath sounds: Normal breath sounds. No stridor. No decreased breath sounds, wheezing, rhonchi or rales.   Abdominal:      General: Abdomen is flat. Bowel sounds are normal. There is no distension.      Palpations: Abdomen is soft.      Tenderness: There is no abdominal tenderness. There is no right CVA tenderness, left CVA tenderness, guarding or rebound.    Musculoskeletal:      Cervical back: Full passive range of motion without pain and neck supple. No signs of trauma. No pain with movement.      Right lower leg: No edema.      Left lower leg: No edema.   Lymphadenopathy:      Cervical: No cervical adenopathy.   Skin:     General: Skin is warm and dry.      Capillary Refill: Capillary refill takes less than 2 seconds.      Coloration: Skin is not cyanotic, jaundiced or pale.   Neurological:      Mental Status: She is alert and oriented to person, place, and time.      GCS: GCS eye subscore is 4. GCS verbal subscore is 5. GCS motor subscore is 6.      Sensory: Sensation is intact.      Motor: Motor function is intact.      Coordination: Finger-Nose-Finger Test abnormal (right upper extremity). Heel to [place] Test normal.      Gait: Gait normal.      Comments: Patient is awake, alert and oriented to person/place/time/event. No dysarthria or facial asymmetry. (+) visual field deficit of right inferolateral peripheral vision of right eye (not new per patient). Sensation equal/intact bilaterally.  Strength 5/5 in bilateral upper and lower extremities.  Negative pronator drift.  Patient has abnormal finger-to-nose in the right upper extremity but coordination including finger-to-nose of left upper extremity and heel-to-shin of bilateral lower extremities is intact.   Psychiatric:         Mood and Affect: Mood normal.         Speech: Speech normal.         Behavior: Behavior is cooperative.         Vital Signs  ED Triage Vitals   Temperature Pulse Respirations Blood Pressure SpO2   09/09/24 1056 09/09/24 1056 09/09/24 1056 09/09/24 1056 09/09/24 1056   98.6 °F (37 °C) 74 16 164/80 99 %      Temp src Heart Rate Source Patient Position - Orthostatic VS BP Location FiO2 (%)   -- 09/09/24 1200 09/09/24 1200 09/09/24 1200 --    Monitor Sitting Right arm       Pain Score       09/09/24 1057       6           Vitals:    09/09/24 1200 09/09/24 1300 09/09/24 1400 09/09/24 1645   BP:  151/75 132/70 147/75 114/59   Pulse: 64 65 69 77   Patient Position - Orthostatic VS: Sitting Sitting Sitting Sitting         Visual Acuity  Visual Acuity      Flowsheet Row Most Recent Value   L Pupil Size (mm) 2   R Pupil Size (mm) 2            ED Medications  Medications   Cholecalciferol (VITAMIN D3) tablet 5,000 Units (has no administration in time range)   aspirin chewable tablet 81 mg (has no administration in time range)   diphenhydrAMINE (BENADRYL) tablet 25 mg (has no administration in time range)   folic acid (FOLVITE) tablet 1 mg (has no administration in time range)   levothyroxine tablet 50 mcg (has no administration in time range)   levETIRAcetam (KEPPRA) tablet 750 mg (has no administration in time range)   saccharomyces boulardii (FLORASTOR) capsule 250 mg (has no administration in time range)   cyanocobalamin (VITAMIN B-12) tablet 1,000 mcg (has no administration in time range)   enoxaparin (LOVENOX) subcutaneous injection 40 mg (has no administration in time range)   pantoprazole (PROTONIX) EC tablet 40 mg (has no administration in time range)   dexamethasone (DECADRON) injection 4 mg (has no administration in time range)   acetaminophen (TYLENOL) tablet 975 mg (975 mg Oral Given 9/9/24 1501)   iohexol (OMNIPAQUE) 350 MG/ML injection (MULTI-DOSE) 100 mL (85 mL Intravenous Given 9/9/24 1517)   dexamethasone (DECADRON) injection 4 mg (4 mg Intravenous Given 9/9/24 1644)       Diagnostic Studies  Results Reviewed       Procedure Component Value Units Date/Time    Comprehensive metabolic panel [[id number]] Collected: 09/09/24 1148    Lab Status: Final result Specimen: Blood from Arm, Right Updated: 09/09/24 1415     Sodium 135 mmol/L      Potassium 4.2 mmol/L      Chloride 99 mmol/L      CO2 27 mmol/L      ANION GAP 9 mmol/L      BUN 15 mg/dL      Creatinine 0.89 mg/dL      Glucose 92 mg/dL      Calcium 9.3 mg/dL      AST 22 U/L      ALT 25 U/L      Alkaline Phosphatase 47 U/L      Total Protein 6.7  g/dL      Albumin 4.1 g/dL      Total Bilirubin 0.46 mg/dL      eGFR 64 ml/min/1.73sq m     Narrative:      [employer] guidelines for Chronic Kidney Disease (CKD):     Stage 1 with normal or high GFR (GFR > 90 mL/min/1.73 square meters)    Stage 2 Mild CKD (GFR = 60-89 mL/min/1.73 square meters)    Stage 3A Moderate CKD (GFR = 45-59 mL/min/1.73 square meters)    Stage 3B Moderate CKD (GFR = 30-44 mL/min/1.73 square meters)    Stage 4 Severe CKD (GFR = 15-29 mL/min/1.73 square meters)    Stage 5 End Stage CKD (GFR <15 mL/min/1.73 square meters)  Note: GFR calculation is accurate only with a steady state creatinine    Magnesium [[id number]]  (Normal) Collected: 09/09/24 1148    Lab Status: Final result Specimen: Blood from Arm, Right Updated: 09/09/24 1415     Magnesium 2.0 mg/dL     TSH, 3rd generation with Free T4 reflex [[id number]]  (Normal) Collected: 09/09/24 1148    Lab Status: Final result Specimen: Blood from Arm, Right Updated: 09/09/24 1415     TSH 3RD GENERATON 2.406 uIU/mL     Protime-INR [[id number]]  (Normal) Collected: 09/09/24 1148    Lab Status: Final result Specimen: Blood from Arm, Right Updated: 09/09/24 1212     Protime 12.3 seconds      INR 0.85    Narrative:      INR Therapeutic Range    Indication                                             INR Range      Atrial Fibrillation                                               2.0-3.0  Hypercoagulable State                                    2.0.2.3  Left Ventricular Asist Device                            2.0-3.0  Mechanical Heart Valve                                  -    Aortic(with afib, MI, embolism, HF, LA enlargement,    and/or coagulopathy)                                     2.0-3.0 (2.5-3.5)     Mitral                                                             2.5-3.5  Prosthetic/Bioprosthetic Heart Valve               2.0-3.0  Venous thromboembolism (VTE: VT, PE        2.0-3.0    APTT [[id number]]  (Normal) Collected:  09/09/24 1148    Lab Status: Final result Specimen: Blood from Arm, Right Updated: 09/09/24 1212     PTT 25 seconds     UA w Reflex to Microscopic w Reflex to Culture [[id number]] Collected: 09/09/24 1148    Lab Status: Final result Specimen: Urine, Clean Catch Updated: 09/09/24 1207     Color, UA Colorless     Clarity, UA Clear     Specific Gravity, UA 1.003     pH, UA 6.0     Leukocytes, UA Negative     Nitrite, UA Negative     Protein, UA Negative mg/dl      Glucose, UA Negative mg/dl      Ketones, UA Negative mg/dl      Urobilinogen, UA <2.0 mg/dl      Bilirubin, UA Negative     Occult Blood, UA Negative    CBC and differential [[id number]]  (Abnormal) Collected: 09/09/24 1148    Lab Status: Final result Specimen: Blood from Arm, Right Updated: 09/09/24 1158     WBC 5.79 Thousand/uL      RBC 3.44 Million/uL      Hemoglobin 11.3 g/dL      Hematocrit 34.4 %       fL      MCH 32.8 pg      MCHC 32.8 g/dL      RDW 14.4 %      MPV 8.1 fL      Platelets 220 Thousands/uL      nRBC 0 /100 WBCs      Segmented % 87 %      Immature Grans % 1 %      Lymphocytes % 4 %      Monocytes % 8 %      Eosinophils Relative 0 %      Basophils Relative 0 %      Absolute Neutrophils 5.00 Thousands/µL      Absolute Immature Grans 0.07 Thousand/uL      Absolute Lymphocytes 0.24 Thousands/µL      Absolute Monocytes 0.45 Thousand/µL      Eosinophils Absolute 0.02 Thousand/µL      Basophils Absolute 0.01 Thousands/µL                    CTA head and neck with and without contrast   Final Result by Jhonny Rodriguez DO (09/09 1538)      CT Brain: Vasogenic edema left parietal and left occipital lobes similar to prior study in attributed to history of brain metastasis. Mass effect on the posterior aspect of the left lateral ventricle unchanged.      CT Angiography:  Unremarkable CTA neck and brain.                  Workstation performed: [id number]                    Procedures  Procedures         ED Course  ED Course as of 09/09/24  "1849   Mon Sep 09, 2024   1216 Hemoglobin(!): 11.3  Mild anemia, stable.    1354 RN contacted labs regarding delay in CMP results.    1400 CT delayed due to delay in CMP results. Contacted CT and requested that patient be taken prior to lab results.    1658 Per radiation oncologist (Dr. Rios): \"You can give her 4mg IV x 1, then tell her to go back to 2mg BID at home, dropping 1mg per week. She can then remain on 1mg BID until her new MRI which is scheduled for October 8th.\"                                                 Medical Decision Making  Patient is a pleasant 74 y/o female with a PMHx of HLD, HTN and metastatic lung cancer (w/ mets to the brain), presenting to the ED for evaluation of confusion and disorientation x2 days.     Patient's labs are unremarkable. UA normal. CT brain today shows \"Vasogenic edema left parietal and left occipital lobes similar to prior study in attributed to history of brain metastasis. Mass effect on the posterior aspect of the left lateral ventricle unchanged\". I spoke with Radiation/Oncology (Dr. Rios) who recommended a dose of IV decadron (4mg) in the ED followed by a new steroid taper at home (2mg BID x1 week, decreasing by 1mg per week and then remaining on 1mg BID until her next scheduled MRI on 10/8).  I discussed the results and recommendations with patient in detail.  Patient lives alone and is not comfortable going home with her current symptoms. She was admitted to internal medicine for continued steroids and close monitoring.     Amount and/or Complexity of Data Reviewed  Labs: ordered. Decision-making details documented in ED Course.  Radiology: ordered.  Discussion of management or test interpretation with external provider(s): Dr. Rios (Rad/Onc)    Risk  OTC drugs.  Prescription drug management.  Decision regarding hospitalization.                 Disposition  Final diagnoses:   Confusion   Vasogenic cerebral edema (HCC)     Time reflects when diagnosis " was documented in both MDM as applicable and the Disposition within this note       Time User Action Codes Description Comment    9/9/2024  4:55 PM Ny Webb Add [R41.0] Confusion     9/9/2024  4:55 PM Ny Webb Add [G93.6] Vasogenic cerebral edema (HCC)           ED Disposition       ED Disposition   Admit    Condition   Stable    Date/Time   Mon Sep 9, 2024  4:54 PM    Comment   Case was discussed with SKINNY and the patient's admission status was agreed to be Admission Status: observation status to the service of Dr. Hanna.               Follow-up Information    None         Patient's Medications   Discharge Prescriptions    No medications on file       No discharge procedures on file.    PDMP Review         Value Time User    PDMP Reviewed  Yes 7/4/2024  4:14 PM Lili Mccoy MD            ED Provider  Electronically Signed by             Ny Webb PA-C  09/09/24 1087

## 2024-09-09 NOTE — H&P
[facility]  H&P  Name: Ayla Nye 73 y.o. female I MRN: [id number]  Unit/Bed#: ED-35 I Date of Admission: 9/9/2024   Date of Service: 9/9/2024 I Hospital Day: 0      Assessment & Plan   * Altered mental status  Assessment & Plan  History significant for spatial disorientation, apraxia and anosognosia localizes in the parietal lobe, can be explained with parietal lobe vasogenic edema noted in the imaging  Visual agnosia, visual field deficits on exam can be explained with abnormal imaging noted in the occipital lobe  Labs so far noncontributory to her confusion    Patient presentation and physical exam can all be explained by her imaging.  Likely from vasogenic edema surrounding the affected region of her brain.      Plan   Dexamethasone 4 mg every 12 hr  Radiation oncologist consult      Abnormal imaging of central nervous system  Assessment & Plan  CT H 9/9 : CT Brain: Vasogenic edema left parietal and left occipital lobes similar to prior study in attributed to history of brain metastasis. Mass effect on the posterior aspect of the left lateral ventricle unchanged.     CT Angiography:  Unremarkable CTA neck and brain.    See assessment and plan for the principal problem    Hypothyroidism  Assessment & Plan  Continue PTA levothyroxine 50 mcg daily    Psoriatic arthritis (HCC)  Assessment & Plan  Continue PTA Benadryl 25 mg as needed for itching           VTE Pharmacologic Prophylaxis: VTE Score: 7 High Risk (Score >/= 5) - Pharmacological DVT Prophylaxis Ordered: enoxaparin (Lovenox). Sequential Compression Devices Ordered.  Code Status: Level 1 - Full Code discussed with patient  Anticipated Length of Stay: Patient will be admitted on an observation basis with an anticipated length of stay of less than 2 midnights secondary to prior lesions in the CT imaging.    Chief Complaint: Confusion    History of Present Illness:  Ayla Nye is a 73 y.o. female with a PMH of  metastatic lung cancer who presents with ongoing confusion over the past 2 days.  Describes events as not knowing what objects are used for, difficulty with manipulation of familiar objects, such as water faucets.  Complains of difficulties of carrying out tasks, feeling disoriented at home.  She was recently discharged with being on steroid taper in managing her vasogenic edema on her brain imaging during her prior admission.  September 9,(today) is her last day of her steroid taper with 1 mg.     She has past medical history of non-small cell lung cancer.  Was found incidentally on imaging in the beginning of the year with pneumonia episode.  Biopsy subsequently confirmed with non-small cell lung cancer.  She was then underwent chemo and radiation therapy.    Her first admission of the year was on July 29, where she presented with headache and confusion.  CT head showing subacute infarct likely metastatic lesions.  She was then placed on Keppra 750 daily for seizure prophylactics since she is high risk.  Post discharge patient seen oncologist and underwent CRT.  Later, was admitted second time on August 21 for disorientation and coordination difficulties.  CT head showed worsening of vasogenic edema.  She was placed on dexamethasone and ordered to complete steroid taper with lastly 1 mg x 3 days to finish off.  Currently she is on her last day of her taper.     Upon initial evaluation, patient was awake, not in distress.  Exam, afebrile, hypertensive with SBP in 160s, workup resulted normal white count and hemoglobin at 11.3, electrolytes noncontributory to her encephalopathy.  UA showed no signs of infection.  CT head showed vasogenic edema of the left parietal and left occipital lobe similar to her prior study.  She was given Decadron 4 mg    Patient denies any fever, nausea, vomiting, chest pain, shortness of breath, abdominal pain diarrhea, recent viral infection.  She denies any new sensory or motor deficits  since July.  She has right-sided visual field deficit at baseline.    Review of Systems:  Per HPI    Past Medical and Surgical History:   Past Medical History:   Diagnosis Date    Allergic 2022    Allergic to neomiacin and cephalexin    Cataract Nov. 2023    Due to have durgery June 2024    Essential thrombocytosis (HCC)     controlled with medication    Hyperlipidemia     Hypertension     Mass in chest     Nodular goiter     Pneumonia Oct 16, 2023    Also  Feb 2024    Psoriasis     SIRS (systemic inflammatory response syndrome) (HCC) 06/22/2024       Past Surgical History:   Procedure Laterality Date    APPENDECTOMY      BREAST CYST EXCISION Right 2009    COLONOSCOPY  10/31/2018    DXA PROCEDURE (HISTORICAL)  04/21/2017    MAMMO (HISTORICAL)      8-24-18    MAMMO NEEDLE LOCALIZATION RIGHT (ALL INC) Right 07/13/2009    SKIN LESION EXCISION      bridge of nose       Meds/Allergies:  Prior to Admission medications    Medication Sig Start Date End Date Taking? Authorizing Provider   aspirin 81 mg chewable tablet Chew 81 mg daily. 8/1/19   Historical Provider, MD   Cholecalciferol (VITAMIN D3) 5000 units TABS Take 5,000 Units by mouth Daily    Historical Provider, MD   dexamethasone (DECADRON) 1 mg tablet Take 1 tablet (1 mg total) by mouth daily with breakfast for 3 days Do not start before September 7, 2024. 9/7/24 9/10/24  Michelle Banks MD   diphenhydrAMINE (BENADRYL) 25 mg capsule Take 25 mg by mouth every 12 (twelve) hours as needed for itching    Historical Provider, MD   folic acid (FOLVITE) 1 mg tablet Take 1 tablet (1 mg total) by mouth daily 8/17/24   Rosalinda Castro MD   levETIRAcetam (Keppra) 750 mg tablet Take 1 tablet (750 mg total) by mouth 2 (two) times a day 9/6/24 10/6/24  Rafy Angelo MD   levothyroxine (Synthroid) 50 mcg tablet Take 1 tablet (50 mcg total) by mouth daily 9/3/24   Rosalinda Castro MD   oxyCODONE (Roxicodone) 5 immediate release tablet Take 1 tablet (5 mg total) by mouth every 4  (four) hours as needed for moderate pain Max Daily Amount: 30 mg 24   Rosalinda Castro MD   pantoprazole (PROTONIX) 40 mg tablet TAKE 1 TABLET(40 MG) BY MOUTH DAILY EARLY MORNING 24   Damaso Silva MD   Probiotic Product (PROBIOTIC DAILY PO) Take 1 capsule by mouth daily    Historical Provider, MD   vitamin B-12 (VITAMIN B-12) 1,000 mcg tablet Take 1 tablet (1,000 mcg total) by mouth daily 23   ZULEYMA Boland     I have reviewed home medications with patient personally.    Allergies:   Allergies   Allergen Reactions    Doxycycline Headache    Keflex [Cephalexin] Rash    Neomycin-Polymyxin-Dexameth Eye Swelling       Social History:  Marital Status:    Occupation: retired  Patient Pre-hospital Living Situation: Home  Patient Pre-hospital Level of Mobility: walks  Patient Pre-hospital Diet Restrictions: none  Substance Use History:   Social History     Substance and Sexual Activity   Alcohol Use Not Currently     Social History     Tobacco Use   Smoking Status Former    Current packs/day: 1.00    Average packs/day: 1 pack/day for 58.7 years (58.7 ttl pk-yrs)    Types: Cigarettes    Start date:     Passive exposure: Past   Smokeless Tobacco Never   Tobacco Comments    Quit      Social History     Substance and Sexual Activity   Drug Use Never       Family History:  Family History   Problem Relation Age of Onset    Stroke Mother     Depression Mother             Hypertension Mother     Hearing loss Mother     Tuberculosis Father     Cancer Brother         lung    Tuberculosis Brother     Coronary artery disease Brother     Hypertension Brother     No Known Problems Daughter     No Known Problems Daughter     No Known Problems Maternal Grandmother     No Known Problems Maternal Grandfather     No Known Problems Paternal Grandmother     No Known Problems Paternal Grandfather     No Known Problems Maternal Aunt     No Known Problems Maternal Aunt     No Known Problems Maternal  "Aunt     No Known Problems Maternal Aunt     No Known Problems Paternal Aunt     Cancer Brother         Throat cancer       Physical Exam:     Vitals:   Blood Pressure: 114/59 (09/09/24 1645)  Pulse: 77 (09/09/24 1645)  Temperature: 98.6 °F (37 °C) (09/09/24 1056)  Respirations: 18 (09/09/24 1645)  Height: 5' 2\" (157.5 cm) (09/09/24 1056)  Weight - Scale: 50.5 kg (111 lb 5.3 oz) (09/09/24 1056)  SpO2: 98 % (09/09/24 1645)    Physical Exam  Vitals and nursing note reviewed.   Constitutional:       General: She is not in acute distress.     Appearance: She is well-developed.   HENT:      Head: Normocephalic and atraumatic.   Eyes:      Conjunctiva/sclera: Conjunctivae normal.   Cardiovascular:      Rate and Rhythm: Normal rate and regular rhythm.      Heart sounds: No murmur heard.  Pulmonary:      Effort: Pulmonary effort is normal. No respiratory distress.      Breath sounds: Normal breath sounds.   Abdominal:      Palpations: Abdomen is soft.      Tenderness: There is no abdominal tenderness.   Musculoskeletal:         General: No swelling.      Cervical back: Neck supple.   Skin:     General: Skin is warm and dry.      Capillary Refill: Capillary refill takes less than 2 seconds.   Neurological:      Mental Status: She is alert.      Comments: Cranial nerves.    Deficits for right sided inferior quadrantopia bilaterally  Otherwise all intact    Motor: Strength full and symmetric bilaterally in upper and lower extremities  Sensory: To touch intact in upper and lower extremities  Coordination: Dysmetria of right side     Psychiatric:         Mood and Affect: Mood normal.          Additional Data:     Lab Results:  Results from last 7 days   Lab Units 09/09/24  1148   WBC Thousand/uL 5.79   HEMOGLOBIN g/dL 11.3*   HEMATOCRIT % 34.4*   PLATELETS Thousands/uL 220   SEGS PCT % 87*   LYMPHO PCT % 4*   MONO PCT % 8   EOS PCT % 0     Results from last 7 days   Lab Units 09/09/24  1148   SODIUM mmol/L 135   POTASSIUM mmol/L " 4.2   CHLORIDE mmol/L 99   CO2 mmol/L 27   BUN mg/dL 15   CREATININE mg/dL 0.89   ANION GAP mmol/L 9   CALCIUM mg/dL 9.3   ALBUMIN g/dL 4.1   TOTAL BILIRUBIN mg/dL 0.46   ALK PHOS U/L 47   ALT U/L 25   AST U/L 22   GLUCOSE RANDOM mg/dL 92     Results from last 7 days   Lab Units 09/09/24  1148   INR  0.85         Lab Results   Component Value Date    HGBA1C 5.3 07/30/2024           Lines/Drains:  Invasive Devices       Peripheral Intravenous Line  Duration             Peripheral IV 09/09/24 Right Antecubital <1 day                        Imaging: Personally reviewed the following imaging: CT head  CTA head and neck with and without contrast   Final Result by Jhonny Rodriguez DO (09/09 1538)      CT Brain: Vasogenic edema left parietal and left occipital lobes similar to prior study in attributed to history of brain metastasis. Mass effect on the posterior aspect of the left lateral ventricle unchanged.      CT Angiography:  Unremarkable CTA neck and brain.                  Workstation performed: [id number]             EKG and Other Studies Reviewed on Admission:   EKG: No EKG obtained.    ** Please Note: This note has been constructed using a voice recognition system. **

## 2024-09-09 NOTE — ED NOTES
Lab called regarding CMP results. Lab reports they're running.        Gabriela Peterson RN  09/09/24 3094

## 2024-09-10 ENCOUNTER — APPOINTMENT (INPATIENT)
Dept: MRI IMAGING | Facility: HOSPITAL | Age: 74
DRG: 987 | End: 2024-09-10
Payer: MEDICARE

## 2024-09-10 PROBLEM — M25.551 RIGHT HIP PAIN: Status: ACTIVE | Noted: 2024-09-10

## 2024-09-10 LAB
ALBUMIN SERPL BCG-MCNC: 3.5 G/DL (ref 3.5–5)
ALP SERPL-CCNC: 40 U/L (ref 34–104)
ALT SERPL W P-5'-P-CCNC: 23 U/L (ref 7–52)
ANION GAP SERPL CALCULATED.3IONS-SCNC: 8 MMOL/L (ref 4–13)
AST SERPL W P-5'-P-CCNC: 17 U/L (ref 13–39)
BASOPHILS # BLD AUTO: 0.01 THOUSANDS/ÂΜL (ref 0–0.1)
BASOPHILS NFR BLD AUTO: 0 % (ref 0–1)
BILIRUB SERPL-MCNC: 0.33 MG/DL (ref 0.2–1)
BUN SERPL-MCNC: 14 MG/DL (ref 5–25)
CALCIUM SERPL-MCNC: 8.8 MG/DL (ref 8.4–10.2)
CHLORIDE SERPL-SCNC: 104 MMOL/L (ref 96–108)
CO2 SERPL-SCNC: 25 MMOL/L (ref 21–32)
CREAT SERPL-MCNC: 0.83 MG/DL (ref 0.6–1.3)
EOSINOPHIL # BLD AUTO: 0 THOUSAND/ÂΜL (ref 0–0.61)
EOSINOPHIL NFR BLD AUTO: 0 % (ref 0–6)
ERYTHROCYTE [DISTWIDTH] IN BLOOD BY AUTOMATED COUNT: 14.2 % (ref 11.6–15.1)
GFR SERPL CREATININE-BSD FRML MDRD: 70 ML/MIN/1.73SQ M
GLUCOSE P FAST SERPL-MCNC: 117 MG/DL (ref 65–99)
GLUCOSE SERPL-MCNC: 117 MG/DL (ref 65–140)
HCT VFR BLD AUTO: 29.4 % (ref 34.8–46.1)
HGB BLD-MCNC: 9.7 G/DL (ref 11.5–15.4)
IMM GRANULOCYTES # BLD AUTO: 0.04 THOUSAND/UL (ref 0–0.2)
IMM GRANULOCYTES NFR BLD AUTO: 1 % (ref 0–2)
LYMPHOCYTES # BLD AUTO: 0.22 THOUSANDS/ÂΜL (ref 0.6–4.47)
LYMPHOCYTES NFR BLD AUTO: 6 % (ref 14–44)
MCH RBC QN AUTO: 32.8 PG (ref 26.8–34.3)
MCHC RBC AUTO-ENTMCNC: 33 G/DL (ref 31.4–37.4)
MCV RBC AUTO: 99 FL (ref 82–98)
MONOCYTES # BLD AUTO: 0.32 THOUSAND/ÂΜL (ref 0.17–1.22)
MONOCYTES NFR BLD AUTO: 8 % (ref 4–12)
NEUTROPHILS # BLD AUTO: 3.22 THOUSANDS/ÂΜL (ref 1.85–7.62)
NEUTS SEG NFR BLD AUTO: 85 % (ref 43–75)
NRBC BLD AUTO-RTO: 0 /100 WBCS
PLATELET # BLD AUTO: 217 THOUSANDS/UL (ref 149–390)
PMV BLD AUTO: 8.6 FL (ref 8.9–12.7)
POTASSIUM SERPL-SCNC: 4.2 MMOL/L (ref 3.5–5.3)
PROT SERPL-MCNC: 5.8 G/DL (ref 6.4–8.4)
RBC # BLD AUTO: 2.96 MILLION/UL (ref 3.81–5.12)
SODIUM SERPL-SCNC: 137 MMOL/L (ref 135–147)
WBC # BLD AUTO: 3.81 THOUSAND/UL (ref 4.31–10.16)

## 2024-09-10 PROCEDURE — A9585 GADOBUTROL INJECTION: HCPCS

## 2024-09-10 PROCEDURE — 70553 MRI BRAIN STEM W/O & W/DYE: CPT

## 2024-09-10 PROCEDURE — 85025 COMPLETE CBC W/AUTO DIFF WBC: CPT

## 2024-09-10 PROCEDURE — 99233 SBSQ HOSP IP/OBS HIGH 50: CPT | Performed by: RADIOLOGY

## 2024-09-10 PROCEDURE — 80053 COMPREHEN METABOLIC PANEL: CPT

## 2024-09-10 PROCEDURE — 99232 SBSQ HOSP IP/OBS MODERATE 35: CPT | Performed by: INTERNAL MEDICINE

## 2024-09-10 RX ORDER — LANOLIN ALCOHOL/MO/W.PET/CERES
3 CREAM (GRAM) TOPICAL ONCE
Status: COMPLETED | OUTPATIENT
Start: 2024-09-10 | End: 2024-09-10

## 2024-09-10 RX ORDER — GADOBUTROL 604.72 MG/ML
10 INJECTION INTRAVENOUS
Status: COMPLETED | OUTPATIENT
Start: 2024-09-10 | End: 2024-09-10

## 2024-09-10 RX ADMIN — DEXAMETHASONE SODIUM PHOSPHATE 4 MG: 4 INJECTION INTRA-ARTICULAR; INTRALESIONAL; INTRAMUSCULAR; INTRAVENOUS; SOFT TISSUE at 21:07

## 2024-09-10 RX ADMIN — LEVETIRACETAM 750 MG: 500 TABLET, FILM COATED ORAL at 17:06

## 2024-09-10 RX ADMIN — ACETAMINOPHEN 975 MG: 325 TABLET, FILM COATED ORAL at 03:12

## 2024-09-10 RX ADMIN — Medication 5000 UNITS: at 09:05

## 2024-09-10 RX ADMIN — PANTOPRAZOLE SODIUM 40 MG: 40 TABLET, DELAYED RELEASE ORAL at 06:10

## 2024-09-10 RX ADMIN — ASPIRIN 81 MG 81 MG: 81 TABLET ORAL at 09:04

## 2024-09-10 RX ADMIN — DEXAMETHASONE SODIUM PHOSPHATE 4 MG: 4 INJECTION INTRA-ARTICULAR; INTRALESIONAL; INTRAMUSCULAR; INTRAVENOUS; SOFT TISSUE at 06:10

## 2024-09-10 RX ADMIN — LEVETIRACETAM 750 MG: 500 TABLET, FILM COATED ORAL at 09:04

## 2024-09-10 RX ADMIN — ACETAMINOPHEN 975 MG: 325 TABLET, FILM COATED ORAL at 15:24

## 2024-09-10 RX ADMIN — ACETAMINOPHEN 975 MG: 325 TABLET, FILM COATED ORAL at 21:07

## 2024-09-10 RX ADMIN — CYANOCOBALAMIN TAB 500 MCG 1000 MCG: 500 TAB at 09:05

## 2024-09-10 RX ADMIN — LEVOTHYROXINE SODIUM 50 MCG: 50 TABLET ORAL at 06:10

## 2024-09-10 RX ADMIN — GADOBUTROL 10 ML: 604.72 INJECTION INTRAVENOUS at 21:05

## 2024-09-10 RX ADMIN — ACETAMINOPHEN 975 MG: 325 TABLET, FILM COATED ORAL at 09:15

## 2024-09-10 RX ADMIN — FOLIC ACID 1 MG: 1 TABLET ORAL at 09:04

## 2024-09-10 RX ADMIN — Medication 3 MG: at 21:14

## 2024-09-10 NOTE — ASSESSMENT & PLAN NOTE
Pt last admitted 8/21 for confusion and disorientation following CRT for adenocarcinoma of lungs with brain mets. Pt discharged on steroid taper and was on last day of taper (1 mg daily) when she presented for this ED admission. Pt had initially improved on her taper course following her last hospitalization but began to have spatial disorientation, confusion of where she left objects, apraxia when she transitioned from 2 mg to 1 mg dose.  CT H 9/9 : CT Brain: Vasogenic edema left parietal and left occipital lobes similar to prior study in attributed to history of brain metastasis. Mass effect on the posterior aspect of the left lateral ventricle unchanged.  Labs noncontributory to her confusion. US unremarkable and electrolytes wnl.    AMS with spatial disorientation, apraxia, visual field deficits with decreased right peripheral vision is likely 2/2 to vasogenic edema of left parietal and left occipital lobes    Patient AAOx4 today and endorses improvement in confusion since starting 4 mg IV BID Dexamethasone dose inpatient    Plan:  Continue dexamethasone 4 mg IV Q12 hours  Radiation oncology consulted  Repeat MRI brain  If MRI brain negative, discharge on dexamethasone with prolonged taper. Hold taper at 1 mg BID until next MRI scheduled for next month

## 2024-09-10 NOTE — CONSULTS
Inpatient Consultation - Radiation Oncology   Ayla Nye 73 y.o. female MRN: [id number]  Unit/Bed#: S -01 Encounter: [id number]. Consult Requested by Jhoan Medellin MD  Inpatient consult to Radiation Oncology  Consult performed by: Wander Rios MD  Consult ordered by: Cyrus Coker MD        Reason for Consult / Principal Problem: Altered mental status, cerebral edema with brain mets s/p radiosurgery approximately 1 month ago.  Hx and PE limited by: None    Assessment & Plan   Ayla Nye is a 73 y.o. female known to radiation oncology for [id number] (IIIB) NSCLC of the right upper lobe, s/p concurrent chemoRT completing on 7/5/24. She was then found to have 5 intracranial metastatic lesions, which were treated with SRS on 8/8/24.  She was then placed on a steroid taper, and upon reaching 1mg dexamethasone daily, was admitted to the hospital on 8/20/24 with altered mental status with CT head w/o revealing worsening left parieto-occipital edema corresponding to the location of one of the recently radiated lesions.  She was discharged on an increased dose of dexamethasone, with a more prolonged taper, and was doing well again until reaching a level of 1mg dexamethasone daily, at which point she again developed increased confusion and re-presented to the ED.  She has again responded quickly to increased steroids, with resolution of her symptoms.  CTA of the head and neck again revealed significant vasogenic edema in the left parieto-occipital region, essentially unchanged.    At this time, as it has been 1 month since the radiosurgery and we have been unable to wean her off steroids, I would recommend a repeat MRI of the brain BT protocol with and without contrast.  Assuming this is negative for any new significant findings, we would again discharge her on dexamethasone with a prolonged taper, likely holding the taper at 1 mg twice daily, and keeping her on 1 mg twice daily until her next MRI which  "is scheduled for approximately 1 month from now.  Further recommendations will be provided once I can review the new MRI.    Furthermore, her main complaint at this time is in regards to pain related to a soft tissue mass in the right ischioanal fossa.  This may be a site of metastatic disease, and amenable to palliative radiation.  However, given the somewhat unusual location and presentation, we had previously recommended biopsy prior to treatment.  A biopsy was ordered nearly a month ago by medical oncology and is not scheduled until the end of this month.  The family has requested potentially obtaining the biopsy during this admission and we will check with the inpatient team to see if this is feasible.    Wander Rios MD  Department of Radiation Oncology  [facility]    History of Present Illness   HPI: Ayla Nye is a 73 y.o. female known to radiation oncology for [id number] (IIIB) NSCLC of the right upper lobe, s/p concurrent chemoRT completing on 7/5/24. She was then found to have 5 intracranial metastatic lesions, which were treated with SRS on 8/8/24.  She was placed on a dexamethasone taper, and had tapered down to 1 mg dexamethasone daily, at which point she began to develop worsening \"fogginess\" and unsteadiness x 2 days, at which point she presented to the ED on .  She also reports a few episodes of significant headache.  She presented to the ED on 8/20/24.  Repeat CT head w/o contrast revealed worsening vasogenic edema in the vicinity of a recently treated L occipital lesion.  Her mental status improved quickly with increased steroids, and she was discharged on 8/23/24 with a more prolonged taper schedule. She is planned to initiate palliative systemic therapy with carbo/alimta/pembro, but this has been delayed due to now multiple admissions for cerebral edema.      She again had tapered down to 1 mg daily, and again developed similar symptoms of what she describes as " "\"fogginess.\"  Upon arrival to ED yesterday, CTA Head revealed essentially stable left parieto-occipital vasogenic edema compared to the prior admission.  She was again started on higher dose steroids, 4mg IV BID, and over the past 24 hrs has again improved fairly quickly.  Her main complaint at this time is pain related to a suspected metastatic soft tissue mass which is located in the right ischioanal fat, measuring 3.8 cm.  The plan as an outpatient was to proceed with U/S guided biopsy, likely followed by palliative radiation.  Unfortunately, there has been a significant delay in obtaining the biopsy.      Prior radiation: yes  Pacemaker: no    Review of Systems   As per the HPI.  Currently denies any HA, confusion, nausea, or focal weakness. Primary complaint is pain related to soft tissue mass in R ischioanal fossa.    Historical Information   Oncology History   Malignant neoplasm of upper lobe, right bronchus or lung (HCC)   2024 Initial Diagnosis    Primary lung adenocarcinoma, right (HCC)     3/26/2024 Biopsy    A-C. Lymph Node, Level 4R :    - Metastatic non-small cell carcinoma, most compatible with a primary lung adenocarcinoma; see note.       D-F. Lymph Node, Level 10R:    - Metastatic non-small cell carcinoma; see note.       G-I. Lymph Node, Level 4L:    - Metastatic non-small cell carcinoma; see note.       4/15/2024 -  Cancer Staged    Staging form: Lung, AJCC 8th Edition  - Clinical stage from 4/15/2024: Stage IIIB (cT2b, cN3, cM0) - Signed by Rogelio Beebe DO on 4/15/2024       5/23/2024 - 7/2/2024 Chemotherapy    alteplase (CATHFLO), 2 mg, Intracatheter, Every 1 Minute as needed, 6 of 6 cycles  CARBOplatin (PARAPLATIN) IVPB (GOG AUC DOSING), 130.4 mg, Intravenous, Once, 6 of 6 cycles  Administration: 130.4 mg (5/23/2024), 144.8 mg (5/30/2024), 138.4 mg (6/6/2024), 144.8 mg (6/13/2024), 132 mg (6/21/2024), 143.6 mg (7/2/2024)  PACLItaxel (TAXOL) chemo IVPB, 45 mg/m2 = 67.2 mg (90 % of " original dose 50 mg/m2), Intravenous, Once, 6 of 6 cycles  Dose modification: 45 mg/m2 (original dose 50 mg/m2, Cycle 1, Reason: Anticipated Tolerance)  Administration: 67.2 mg (2024), 67.2 mg (2024), 67.2 mg (2024), 67.2 mg (2024), 67.2 mg (2024), 67.2 mg (2024)     2024 - 2024 Radiation    Treatment:  Course: C1    Plan ID Energy Fractions Dose per Fraction (cGy) Dose Correction (cGy) Total Dose Delivered (cGy) Elapsed Days   R Lung_Hilum 6X 30 / 30 200 0 6,000 43      Treatment dates:  C1: 2024 - 2024 -  Chemotherapy    cyanocobalamin, 1,000 mcg, Intramuscular, Once, 1 of 3 cycles  Administration: 1,000 mcg (2024)  alteplase (CATHFLO), 2 mg, Intracatheter, Every 1 Minute as needed, 0 of 6 cycles  fosaprepitant (EMEND) IVPB, 150 mg, Intravenous, Once, 0 of 6 cycles  CARBOplatin (PARAPLATIN) IVPB (GOG AUC DOSING), 525 mg, Intravenous, Once, 0 of 6 cycles  pemetrexed (ALIMTA) chemo infusion, 500 mg/m2 = 735 mg, Intravenous, Once, 0 of 6 cycles  pembrolizumab (KEYTRUDA) IVPB, 200 mg, Intravenous, Once, 0 of 5 cycles       Past Medical History:   Diagnosis Date    Allergic     Allergic to neomiacin and cephalexin    Cataract 2023    Due to have durgery 2024    Essential thrombocytosis (HCC)     controlled with medication    Hyperlipidemia     Hypertension     Mass in chest     Nodular goiter     Pneumonia Oct 16, 2023    Also  2024    Psoriasis     SIRS (systemic inflammatory response syndrome) (HCC) 2024     Past Surgical History:   Procedure Laterality Date    APPENDECTOMY      BREAST CYST EXCISION Right     COLONOSCOPY  10/31/2018    DXA PROCEDURE (HISTORICAL)  2017    MAMMO (HISTORICAL)      8-24-18    MAMMO NEEDLE LOCALIZATION RIGHT (ALL INC) Right 2009    SKIN LESION EXCISION      bridge of nose     Family History   Problem Relation Age of Onset    Stroke Mother     Depression Mother              Hypertension Mother     Hearing loss Mother     Tuberculosis Father     Cancer Brother         lung    Tuberculosis Brother     Coronary artery disease Brother     Hypertension Brother     No Known Problems Daughter     No Known Problems Daughter     No Known Problems Maternal Grandmother     No Known Problems Maternal Grandfather     No Known Problems Paternal Grandmother     No Known Problems Paternal Grandfather     No Known Problems Maternal Aunt     No Known Problems Maternal Aunt     No Known Problems Maternal Aunt     No Known Problems Maternal Aunt     No Known Problems Paternal Aunt     Cancer Brother         Throat cancer     Social History   Social History     Substance and Sexual Activity   Alcohol Use Not Currently     Social History     Substance and Sexual Activity   Drug Use Never     Social History     Tobacco Use   Smoking Status Former    Current packs/day: 1.00    Average packs/day: 1 pack/day for 58.7 years (58.7 ttl pk-yrs)    Types: Cigarettes    Start date: 1966    Passive exposure: Past   Smokeless Tobacco Never   Tobacco Comments    Quit 2010       Meds/Allergies   current meds:   Current Facility-Administered Medications:     acetaminophen (TYLENOL) tablet 975 mg, Q6H PRN    aspirin chewable tablet 81 mg, Daily    Cholecalciferol (VITAMIN D3) tablet 5,000 Units, Daily    cyanocobalamin (VITAMIN B-12) tablet 1,000 mcg, Daily    dexamethasone (DECADRON) injection 4 mg, Q12H KRISS    diphenhydrAMINE (BENADRYL) tablet 25 mg, Q6H PRN    enoxaparin (LOVENOX) subcutaneous injection 40 mg, Daily    folic acid (FOLVITE) tablet 1 mg, Daily    levETIRAcetam (KEPPRA) tablet 750 mg, BID    levothyroxine tablet 50 mcg, Early Morning    oxyCODONE (ROXICODONE) IR tablet 5 mg, Q4H PRN    pantoprazole (PROTONIX) EC tablet 40 mg, Daily Before Breakfast    saccharomyces boulardii (FLORASTOR) capsule 250 mg, BID  Allergies   Allergen Reactions    Doxycycline Headache    Keflex [Cephalexin] Rash     Neomycin-Polymyxin-Dexameth Eye Swelling       Objective     Intake/Output Summary (Last 24 hours) at 9/10/2024 0941  Last data filed at 9/10/2024 0754  Gross per 24 hour   Intake 295 ml   Output --   Net 295 ml     Invasive Devices       Peripheral Intravenous Line  Duration             Peripheral IV 09/09/24 Right Antecubital <1 day                    Physical Exam  General Appearance:  Alert, cooperative, no distress, appears stated age  HEENT: normocephalic/atraumatic  Cardiovascular:  Extremities warm and well perfused  Lungs: Respirations unlabored, no cyanosis, able to speak in complete sentences without dyspnea.  Abdomen: Non-distended  Extremities: No cyanosis or edema.  She has an exquisitely tender subcutaneous soft tissue mass in R ischioanal fossa.  Skin: No generalized rash or dermatitis  Neurologic: AAOx3, speech and cognition intact. Moving all extremities appropriately.      Imaging Studies: I have personally reviewed the CTA H+N images in PACS      Code Status: Level 1 - Full Code      Counseling / Coordination of Care  Total Time Spent  40 minutes spent reviewing EMR in preparation for visit, with the patient, coordination with other providers, and documentation.

## 2024-09-10 NOTE — ASSESSMENT & PLAN NOTE
Pt has history of right hip pain, which she continues to endorse during this hospitalization.  MRI pelvis bony wo and w contrast 8/30/2024: Enhancing soft tissue lesion in the right ischioanal fat measuring 3.8 x 2.6 x 3.4 cm, increased in size from 8/5/2024 and 7/3/2024. Findings are suspicious for a malignant lesion, likely a metastasis from patient's lung cancer.  Per heme onc, this may be a site of metastatic disease. Pt had biopsy ordered last month that is scheduled for end of this month.  Pt has previously tried oxycodone for pain but does not like the way it makes her feel.    Plan:  Follow up with biopsy with medical oncology outpatient.

## 2024-09-10 NOTE — PLAN OF CARE
Problem: PAIN - ADULT  Goal: Verbalizes/displays adequate comfort level or baseline comfort level  Description: Interventions:  - Encourage patient to monitor pain and request assistance  - Assess pain using appropriate pain scale  - Administer analgesics based on type and severity of pain and evaluate response  - Implement non-pharmacological measures as appropriate and evaluate response  - Consider cultural and social influences on pain and pain management  - Notify physician/advanced practitioner if interventions unsuccessful or patient reports new pain  Outcome: Progressing     Problem: INFECTION - ADULT  Goal: Absence or prevention of progression during hospitalization  Description: INTERVENTIONS:  - Assess and monitor for signs and symptoms of infection  - Monitor lab/diagnostic results  - Monitor all insertion sites, i.e. indwelling lines, tubes, and drains  - Monitor endotracheal if appropriate and nasal secretions for changes in amount and color  - [place] appropriate cooling/warming therapies per order  - Administer medications as ordered  - Instruct and encourage patient and family to use good hand hygiene technique  - Identify and instruct in appropriate isolation precautions for identified infection/condition  Outcome: Progressing     Problem: DISCHARGE PLANNING  Goal: Discharge to home or other facility with appropriate resources  Description: INTERVENTIONS:  - Identify barriers to discharge w/patient and caregiver  - Arrange for needed discharge resources and transportation as appropriate  - Identify discharge learning needs (meds, wound care, etc.)  - Refer to Case Management Department for coordinating discharge planning if the patient needs post-hospital services based on physician/advanced practitioner order or complex needs related to functional status, cognitive ability, or social support system  Outcome: Progressing     Problem: Knowledge Deficit  Goal: Patient/family/caregiver demonstrates  understanding of disease process, treatment plan, medications, and discharge instructions  Description: Complete learning assessment and assess knowledge base.  Interventions:  - Provide teaching at level of understanding  - Provide teaching via preferred learning methods  Outcome: Progressing     Problem: NEUROSENSORY - ADULT  Goal: Achieves stable or improved neurological status  Description: INTERVENTIONS  - Monitor and report changes in neurological status  - Monitor vital signs such as temperature, blood pressure, glucose, and any other labs ordered   - Initiate measures to prevent increased intracranial pressure  - Monitor for seizure activity and implement precautions if appropriate      Outcome: Progressing

## 2024-09-10 NOTE — PLAN OF CARE
Problem: PAIN - ADULT  Goal: Verbalizes/displays adequate comfort level or baseline comfort level  Description: Interventions:  - Encourage patient to monitor pain and request assistance  - Assess pain using appropriate pain scale  - Administer analgesics based on type and severity of pain and evaluate response  - Implement non-pharmacological measures as appropriate and evaluate response  - Consider cultural and social influences on pain and pain management  - Notify physician/advanced practitioner if interventions unsuccessful or patient reports new pain  Outcome: Progressing     Problem: INFECTION - ADULT  Goal: Absence or prevention of progression during hospitalization  Description: INTERVENTIONS:  - Assess and monitor for signs and symptoms of infection  - Monitor lab/diagnostic results  - Monitor all insertion sites, i.e. indwelling lines, tubes, and drains  - Monitor endotracheal if appropriate and nasal secretions for changes in amount and color  - [place] appropriate cooling/warming therapies per order  - Administer medications as ordered  - Instruct and encourage patient and family to use good hand hygiene technique  - Identify and instruct in appropriate isolation precautions for identified infection/condition  Outcome: Progressing     Problem: DISCHARGE PLANNING  Goal: Discharge to home or other facility with appropriate resources  Description: INTERVENTIONS:  - Identify barriers to discharge w/patient and caregiver  - Arrange for needed discharge resources and transportation as appropriate  - Identify discharge learning needs (meds, wound care, etc.)  - Arrange for interpretive services to assist at discharge as needed  - Refer to Case Management Department for coordinating discharge planning if the patient needs post-hospital services based on physician/advanced practitioner order or complex needs related to functional status, cognitive ability, or social support system  Outcome: Progressing      Problem: Knowledge Deficit  Goal: Patient/family/caregiver demonstrates understanding of disease process, treatment plan, medications, and discharge instructions  Description: Complete learning assessment and assess knowledge base.  Interventions:  - Provide teaching at level of understanding  - Provide teaching via preferred learning methods  Outcome: Progressing     Problem: NEUROSENSORY - ADULT  Goal: Achieves stable or improved neurological status  Description: INTERVENTIONS  - Monitor and report changes in neurological status  - Monitor vital signs such as temperature, blood pressure, glucose, and any other labs ordered   - Initiate measures to prevent increased intracranial pressure  - Monitor for seizure activity and implement precautions if appropriate      Outcome: Progressing

## 2024-09-10 NOTE — PROGRESS NOTES
Progress Note - Hospitalist   Name: Ayla Nye 73 y.o. female I MRN: [id number]  Unit/Bed#: S -01 I Date of Admission: 9/9/2024   Date of Service: 9/10/2024 I Hospital Day: 0    Assessment & Plan  Altered mental status    Pt last admitted 8/21 for confusion and disorientation following CRT for adenocarcinoma of lungs with brain mets. Pt discharged on steroid taper and was on last day of taper (1 mg daily) when she presented for this ED admission. Pt had initially improved on her taper course following her last hospitalization but began to have spatial disorientation, confusion of where she left objects, apraxia when she transitioned from 2 mg to 1 mg dose.  CT H 9/9 : CT Brain: Vasogenic edema left parietal and left occipital lobes similar to prior study in attributed to history of brain metastasis. Mass effect on the posterior aspect of the left lateral ventricle unchanged.  Labs noncontributory to her confusion. US unremarkable and electrolytes wnl.    AMS with spatial disorientation, apraxia, visual field deficits with decreased right peripheral vision is likely 2/2 to vasogenic edema of left parietal and left occipital lobes    Patient AAOx4 today and endorses improvement in confusion since starting 4 mg IV BID Dexamethasone dose inpatient    Plan:  Continue dexamethasone 4 mg IV Q12 hours  Radiation oncology consulted  Repeat MRI brain  If MRI brain negative, discharge on dexamethasone with prolonged taper. Hold taper at 1 mg BID until next MRI scheduled for next month    Psoriatic arthritis (HCC)  Continue PTA Benadryl 25 mg as needed for itching  Hypothyroidism  Continue PTA levothyroxine 50 mcg daily  Abnormal imaging of central nervous system  CT H 9/9 : CT Brain: Vasogenic edema left parietal and left occipital lobes similar to prior study in attributed to history of brain metastasis. Mass effect on the posterior aspect of the left lateral ventricle unchanged.     CT Angiography:  Unremarkable  CTA neck and brain.    See assessment and plan for the principal problem  Right hip pain  Pt has history of right hip pain, which she continues to endorse during this hospitalization.  MRI pelvis bony wo and w contrast 8/30/2024: Enhancing soft tissue lesion in the right ischioanal fat measuring 3.8 x 2.6 x 3.4 cm, increased in size from 8/5/2024 and 7/3/2024. Findings are suspicious for a malignant lesion, likely a metastasis from patient's lung cancer.  Per heme onc, this may be a site of metastatic disease. Pt had biopsy ordered last month that is scheduled for end of this month.  Pt has previously tried oxycodone for pain but does not like the way it makes her feel.    Plan:  Follow up with biopsy with medical oncology outpatient.    VTE Pharmacologic Prophylaxis: VTE Score: 7 High Risk (Score >/= 5) - Pharmacological DVT Prophylaxis Ordered: enoxaparin (Lovenox). Sequential Compression Devices Ordered.    Mobility:   Basic Mobility Inpatient Raw Score: 24  JH-HLM Goal: 8: Walk 250 feet or more  JH-HLM Achieved: 7: Walk 25 feet or more  JH-HLM Goal NOT achieved. Continue with multidisciplinary rounding and encourage appropriate mobility to improve upon JH-HLM goals.    Patient Centered Rounds: I performed bedside rounds with nursing staff today.   Discussions with Specialists or Other Care Team Provider: Radiation oncology    Education and Discussions with Family / Patient: Updated  (daughter) at bedside.    Current Length of Stay: 0 day(s)  Current Patient Status: Inpatient   Certification Statement: The patient will continue to require additional inpatient hospital stay due to MRI tomorrow  Discharge Plan: Anticipate discharge in 24-48 hrs to home.    Code Status: Level 1 - Full Code    Subjective   No acute events reported overnight.  Spoke with patient at bedside.  Patient states she is still bit confused but is less disoriented compared to yesterday.  Patient has been able to get up and  ambulate to the bathroom.  Patient denies dizziness, lightheadedness, headaches, N/V/D, blurry vision.  Patient has been having right hip pain is likely related to the soft tissue mass of her right ischial anal fossa.  Patient has previously tried oxycodone for pain but does not like the way it makes her feel.    Objective     Vitals:   Temp (24hrs), Av.2 °F (36.8 °C), Min:97.8 °F (36.6 °C), Max:98.4 °F (36.9 °C)    Temp:  [97.8 °F (36.6 °C)-98.4 °F (36.9 °C)] 98.3 °F (36.8 °C)  HR:  [53-77] 74  Resp:  [16-18] 18  BP: (114-137)/(59-75) 120/66  SpO2:  [95 %-98 %] 95 %  Body mass index is 20.36 kg/m².     Input and Output Summary (last 24 hours):     Intake/Output Summary (Last 24 hours) at 9/10/2024 1619  Last data filed at 9/10/2024 0754  Gross per 24 hour   Intake 295 ml   Output --   Net 295 ml       Physical Exam  Constitutional:       General: She is not in acute distress.  HENT:      Head: Normocephalic.   Eyes:      General: Visual field deficit (Peripheral right field) present.      Extraocular Movements: Extraocular movements intact.   Cardiovascular:      Rate and Rhythm: Normal rate and regular rhythm.      Heart sounds: No murmur heard.     No friction rub. No gallop.   Pulmonary:      Effort: Pulmonary effort is normal. No respiratory distress.      Comments: Decreased breath sounds in left upper lobe  Abdominal:      General: Abdomen is flat. Bowel sounds are normal. There is no distension.      Palpations: Abdomen is soft.      Tenderness: There is no abdominal tenderness. There is no guarding or rebound.   Neurological:      Mental Status: She is alert and oriented to person, place, and time.      Sensory: Sensation is intact.      Coordination: Coordination is intact.      Comments: Strength 5/5 bilaterally in upper and lower extremities   Psychiatric:         Mood and Affect: Mood normal.         Behavior: Behavior normal.          Lines/Drains:  Lines/Drains/Airways       Active Status        None                            Lab Results: I have reviewed the following results: CBC/BMP:   .     09/10/24  0458 09/10/24  0638   WBC  --  3.81*   HGB  --  9.7*   HCT  --  29.4*   PLT  --  217   SODIUM 137  --    K 4.2  --      --    CO2 25  --    BUN 14  --    CREATININE 0.83  --    GLUC 117  --        Results from last 7 days   Lab Units 09/10/24  0638   WBC Thousand/uL 3.81*   HEMOGLOBIN g/dL 9.7*   HEMATOCRIT % 29.4*   PLATELETS Thousands/uL 217   SEGS PCT % 85*   LYMPHO PCT % 6*   MONO PCT % 8   EOS PCT % 0     Results from last 7 days   Lab Units 09/10/24  0458   SODIUM mmol/L 137   POTASSIUM mmol/L 4.2   CHLORIDE mmol/L 104   CO2 mmol/L 25   BUN mg/dL 14   CREATININE mg/dL 0.83   ANION GAP mmol/L 8   CALCIUM mg/dL 8.8   ALBUMIN g/dL 3.5   TOTAL BILIRUBIN mg/dL 0.33   ALK PHOS U/L 40   ALT U/L 23   AST U/L 17   GLUCOSE RANDOM mg/dL 117     Results from last 7 days   Lab Units 09/09/24  1148   INR  0.85                   Recent Cultures (last 7 days):         Imaging Review: Reviewed radiology reports from this admission including: CT head and CTA head and neck.  CTA head and neck with and without contrast   Final Result by Jhonny Rodriguez DO (09/09 1538)      CT Brain: Vasogenic edema left parietal and left occipital lobes similar to prior study in attributed to history of brain metastasis. Mass effect on the posterior aspect of the left lateral ventricle unchanged.      CT Angiography:  Unremarkable CTA neck and brain.                  Workstation performed: [id number]         MRI inpatient order    (Results Pending)      Other Studies: Other studies reviewed include: Urinalysis    Last 24 Hours Medication List:     Current Facility-Administered Medications:     acetaminophen (TYLENOL) tablet 975 mg, Q6H PRN    aspirin chewable tablet 81 mg, Daily    Cholecalciferol (VITAMIN D3) tablet 5,000 Units, Daily    cyanocobalamin (VITAMIN B-12) tablet 1,000 mcg, Daily    dexamethasone (DECADRON)  injection 4 mg, Q12H KRISS    diphenhydrAMINE (BENADRYL) tablet 25 mg, Q6H PRN    enoxaparin (LOVENOX) subcutaneous injection 40 mg, Daily    folic acid (FOLVITE) tablet 1 mg, Daily    levETIRAcetam (KEPPRA) tablet 750 mg, BID    levothyroxine tablet 50 mcg, Early Morning    oxyCODONE (ROXICODONE) IR tablet 5 mg, Q4H PRN    pantoprazole (PROTONIX) EC tablet 40 mg, Daily Before Breakfast    saccharomyces boulardii (FLORASTOR) capsule 250 mg, BID    Administrative Statements   Today, Patient Was Seen By: Marsha Miller MD  I have spent a total time of 45 minutes in caring for this patient on the day of the visit/encounter including Patient and family education, Impressions, Counseling / Coordination of care, Documenting in the medical record, Reviewing / ordering tests, medicine, procedures  , Obtaining or reviewing history  , and Communicating with other healthcare professionals .    **Please Note: This note may have been constructed using a voice recognition system.**

## 2024-09-11 ENCOUNTER — DOCUMENTATION (OUTPATIENT)
Dept: HEMATOLOGY ONCOLOGY | Facility: CLINIC | Age: 74
End: 2024-09-11

## 2024-09-11 PROBLEM — Z71.89 GOALS OF CARE, COUNSELING/DISCUSSION: Status: ACTIVE | Noted: 2024-09-11

## 2024-09-11 LAB
ANION GAP SERPL CALCULATED.3IONS-SCNC: 4 MMOL/L (ref 4–13)
BASOPHILS # BLD MANUAL: 0 THOUSAND/UL (ref 0–0.1)
BASOPHILS NFR MAR MANUAL: 0 % (ref 0–1)
BUN SERPL-MCNC: 19 MG/DL (ref 5–25)
CALCIUM SERPL-MCNC: 8.8 MG/DL (ref 8.4–10.2)
CHLORIDE SERPL-SCNC: 108 MMOL/L (ref 96–108)
CO2 SERPL-SCNC: 26 MMOL/L (ref 21–32)
CREAT SERPL-MCNC: 0.88 MG/DL (ref 0.6–1.3)
EOSINOPHIL # BLD MANUAL: 0 THOUSAND/UL (ref 0–0.4)
EOSINOPHIL NFR BLD MANUAL: 0 % (ref 0–6)
ERYTHROCYTE [DISTWIDTH] IN BLOOD BY AUTOMATED COUNT: 14.2 % (ref 11.6–15.1)
FOLATE SERPL-MCNC: 20.6 NG/ML
GFR SERPL CREATININE-BSD FRML MDRD: 65 ML/MIN/1.73SQ M
GLUCOSE SERPL-MCNC: 134 MG/DL (ref 65–140)
HCT VFR BLD AUTO: 30.2 % (ref 34.8–46.1)
HGB BLD-MCNC: 9.8 G/DL (ref 11.5–15.4)
LYMPHOCYTES # BLD AUTO: 0.32 THOUSAND/UL (ref 0.6–4.47)
LYMPHOCYTES # BLD AUTO: 4 % (ref 14–44)
MCH RBC QN AUTO: 32.5 PG (ref 26.8–34.3)
MCHC RBC AUTO-ENTMCNC: 32.5 G/DL (ref 31.4–37.4)
MCV RBC AUTO: 100 FL (ref 82–98)
MONOCYTES # BLD AUTO: 0.19 THOUSAND/UL (ref 0–1.22)
MONOCYTES NFR BLD: 3 % (ref 4–12)
NEUTROPHILS # BLD MANUAL: 5.8 THOUSAND/UL (ref 1.85–7.62)
NEUTS SEG NFR BLD AUTO: 92 % (ref 43–75)
PLATELET # BLD AUTO: 209 THOUSANDS/UL (ref 149–390)
PLATELET BLD QL SMEAR: ADEQUATE
PMV BLD AUTO: 8.5 FL (ref 8.9–12.7)
POTASSIUM SERPL-SCNC: 4.2 MMOL/L (ref 3.5–5.3)
RBC # BLD AUTO: 3.02 MILLION/UL (ref 3.81–5.12)
RBC MORPH BLD: NORMAL
SODIUM SERPL-SCNC: 138 MMOL/L (ref 135–147)
VARIANT LYMPHS # BLD AUTO: 1 %
VIT B12 SERPL-MCNC: 703 PG/ML (ref 180–914)
WBC # BLD AUTO: 6.3 THOUSAND/UL (ref 4.31–10.16)

## 2024-09-11 PROCEDURE — 85007 BL SMEAR W/DIFF WBC COUNT: CPT | Performed by: INTERNAL MEDICINE

## 2024-09-11 PROCEDURE — 99222 1ST HOSP IP/OBS MODERATE 55: CPT

## 2024-09-11 PROCEDURE — 80048 BASIC METABOLIC PNL TOTAL CA: CPT | Performed by: INTERNAL MEDICINE

## 2024-09-11 PROCEDURE — 85027 COMPLETE CBC AUTOMATED: CPT | Performed by: INTERNAL MEDICINE

## 2024-09-11 PROCEDURE — 99232 SBSQ HOSP IP/OBS MODERATE 35: CPT | Performed by: INTERNAL MEDICINE

## 2024-09-11 PROCEDURE — 82607 VITAMIN B-12: CPT | Performed by: STUDENT IN AN ORGANIZED HEALTH CARE EDUCATION/TRAINING PROGRAM

## 2024-09-11 PROCEDURE — 82746 ASSAY OF FOLIC ACID SERUM: CPT | Performed by: STUDENT IN AN ORGANIZED HEALTH CARE EDUCATION/TRAINING PROGRAM

## 2024-09-11 PROCEDURE — NC001 PR NO CHARGE: Performed by: RADIOLOGY

## 2024-09-11 RX ORDER — LIDOCAINE 50 MG/G
1 PATCH TOPICAL DAILY
Status: DISCONTINUED | OUTPATIENT
Start: 2024-09-11 | End: 2024-09-12 | Stop reason: HOSPADM

## 2024-09-11 RX ORDER — LANOLIN ALCOHOL/MO/W.PET/CERES
3 CREAM (GRAM) TOPICAL
Status: DISCONTINUED | OUTPATIENT
Start: 2024-09-11 | End: 2024-09-12 | Stop reason: HOSPADM

## 2024-09-11 RX ORDER — LIDOCAINE 50 MG/G
1 PATCH TOPICAL ONCE
Status: COMPLETED | OUTPATIENT
Start: 2024-09-11 | End: 2024-09-12

## 2024-09-11 RX ADMIN — CYANOCOBALAMIN TAB 500 MCG 1000 MCG: 500 TAB at 09:03

## 2024-09-11 RX ADMIN — LEVETIRACETAM 750 MG: 500 TABLET, FILM COATED ORAL at 09:03

## 2024-09-11 RX ADMIN — ACETAMINOPHEN 975 MG: 325 TABLET, FILM COATED ORAL at 21:31

## 2024-09-11 RX ADMIN — Medication 3 MG: at 21:31

## 2024-09-11 RX ADMIN — Medication 250 MG: at 09:03

## 2024-09-11 RX ADMIN — ASPIRIN 81 MG 81 MG: 81 TABLET ORAL at 09:03

## 2024-09-11 RX ADMIN — ACETAMINOPHEN 975 MG: 325 TABLET, FILM COATED ORAL at 15:33

## 2024-09-11 RX ADMIN — ACETAMINOPHEN 975 MG: 325 TABLET, FILM COATED ORAL at 03:23

## 2024-09-11 RX ADMIN — PANTOPRAZOLE SODIUM 40 MG: 40 TABLET, DELAYED RELEASE ORAL at 05:24

## 2024-09-11 RX ADMIN — FOLIC ACID 1 MG: 1 TABLET ORAL at 09:03

## 2024-09-11 RX ADMIN — LEVETIRACETAM 750 MG: 500 TABLET, FILM COATED ORAL at 17:28

## 2024-09-11 RX ADMIN — Medication 5000 UNITS: at 09:03

## 2024-09-11 RX ADMIN — LIDOCAINE 1 PATCH: 50 PATCH CUTANEOUS at 14:54

## 2024-09-11 RX ADMIN — DEXAMETHASONE SODIUM PHOSPHATE 4 MG: 4 INJECTION INTRA-ARTICULAR; INTRALESIONAL; INTRAMUSCULAR; INTRAVENOUS; SOFT TISSUE at 21:31

## 2024-09-11 RX ADMIN — ACETAMINOPHEN 975 MG: 325 TABLET, FILM COATED ORAL at 09:37

## 2024-09-11 RX ADMIN — LEVOTHYROXINE SODIUM 50 MCG: 50 TABLET ORAL at 05:24

## 2024-09-11 RX ADMIN — Medication 250 MG: at 17:28

## 2024-09-11 RX ADMIN — DEXAMETHASONE SODIUM PHOSPHATE 4 MG: 4 INJECTION INTRA-ARTICULAR; INTRALESIONAL; INTRAMUSCULAR; INTRAVENOUS; SOFT TISSUE at 09:04

## 2024-09-11 NOTE — PROGRESS NOTES
"NEURO ONCOLOGY MULTIDISCIPLINARY CANCER CONFERENCE    DATE:   9/11/2024    PRESENTING PROVIDER:  Dr. Rios     DIAGNOSIS:  Metastatic NSCLC         Ayla Nye is a 73 y.o. female who was presented at Neuro Oncology Cancer Conference today.  She has a history of stage IIIB NSCLC of the right upper lobe, s/p concurrent chemoRT completing on 7/5/24.  She was last admitted 8/21 for confusion and disorientation.  She was discharged on steroid taper and was on last day of taper (1 mg daily) when she presented to the ED on 9/9/24 for \"fogginess\".  CT head showed vasogenic edema of the left parietal and left occipital lobe similar to her prior study.     PHYSICIAN RECOMMENDED PLAN:   -LP    Pathology reviewed:  No    Imaging reviewed:  3/28/24 MRI brain  7/30/24 MRI brain  9/10/24 MRI brain    Team agreed to plan.   NCCN guidelines were readily available for review at this discussion.    The final treatment plan will be left at the discretion of the patient and the treating physician.     DISCLAIMERS:  TO THE TREATING PHYSICIAN:  This conference is a meeting of clinicians from various specialty areas who evaluate and discuss patients for whom a multidisciplinary treatment approach is being considered. Please note that the above opinion was a consensus of the conference attendees and is intended only to assist in quality care of the discussed patient.  The responsibility for follow up on the input given during the conference, along with any final decisions regarding plan of care, is that of the patient and the patient's provider. Accordingly, appointments have only been recommended based on this information; and have NOT been scheduled unless otherwise noted.      TO THE PATIENT:  This summary is a brief record of major aspects of your cancer treatment. You may choose to can share a your copy with any of your doctors or nurses. However, this is not a detailed or comprehensive record of your care.     "

## 2024-09-11 NOTE — ASSESSMENT & PLAN NOTE
Palliative care consulted for goals of care counseling with pt. Formal assessment and recs to follow.

## 2024-09-11 NOTE — CONSULTS
Consultation - Palliative Care   Name: Ayla Nye 73 y.o. female I MRN: [id number]  Unit/Bed#: S -01 I Date of Admission: 9/9/2024   Date of Service: 9/11/2024 I Hospital Day: 1    Cancer related pain  Assessment & Plan  Complains of pain in lower back 3/10.  She has been using tylenol which she feels is helpful  Discussed using lidocaine patch to lower back buttock in addition to hip.  Patient has tried oxycodone, she felt it was not helpful and had woken up with a headache the next day.  We discussed the use of other pain medications at this time she would like to continue with tylenol    Goals of care, counseling/discussion  Assessment & Plan  Goals of care  Level 1 code status  Disease focused care without limits.   Will continue discussions regarding GOC as patient's clinical presentation evolves.  Encouraged follow up with Palliative Medicine on an outpatient basis after discharge for continued symptom management.  Our office will contact patient to schedule a hospital follow up.    Social support:  Patient is finding comfort support of family  Supportive listening provided  Normalized experience of patient/family  Provided anxiety containment  Provided anticipatory guidance  Encouraged self care    Follow up  Palliative Care will continue to follow and goals of care discussions will be ongoing.    Please reach out via [employer] Secure Chat if questions or concerns arise.    I have reviewed the patient's controlled substance dispensing history in the Prescription Drug Monitoring Program in compliance with the [facility] regulations before prescribing any controlled substances.  Last refills     Decisional apparatus:  Patient has capacity on exam today.  If capacity is lost, patient's substitute decision maker would default to  adult daughters by [place] Act 169.  Advance Directive/Living Will, POLST and POA Forms: Reviewed and on file.  ER contacts:  Name Relation Home Work Mobile   Juliana Camarena Daughter    [phone]     Name Relation Home Work Mobile   Sue Antony Daughter   [phone]       We appreciate the invitation to be involved in this patient's care.  We will continue to follow throughout this hospitalization.  Please do not hesitate to reach our on call provider through our clinic answering service at [phone] should you have acute symptom control concerns.    ZULEYMA Rubio  Palliative and Supportive Care  Clinic/Answering Service: [phone]  You can find me on [employer]!       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   Follows with Dr. Castro - medical oncology  Treated with chemoradiotherapy therapy. CRT session conducted for brain mets  Has immunotherapy outpatient scheduled for 9/16/2024        We appreciate the invitation to be involved in this patient's care.  We will continue to follow throughout this hospitalization.  Please do not hesitate to reach our on call provider through our clinic answering service at [phone] should you have acute symptom control concerns.    Lisy AMOR  Palliative and Supportive Care  Clinic/Answering Service: [phone]  You can find me on [employer]Connect!     IDENTIFICATION:  Inpatient consult to Palliative Care  Consult performed by: ZULEYMA Pollard  Consult ordered by: Elizabeth Castelan MD        Physician Requesting Consult: Jhoan Medellin MD  Date of admission: 9/9/24  LOS: 2  Reason for Consult / Principal Problem: GOC counseling and SM secondary to Lung cancer with met to brain      History of Present Illness:  Ayla Nye is a 73 y.o. female who presents with a palliative diagnosis of lung cancer with natan mets. She follows with Dr. Castro medical oncology  She was brought into the ED at [facility] on 09/10/24 secondary to altered mental status. She  reports having a difficult with identifying objects and carrying out tasks. CT head showed vasogenic edema of the left parietal and left occipital lobe similar to her prior study.     Patient resting in bed. No family present.  NAD. Complains of pain in lower back/upper buttock. She reports that tylenol is helpful. She had tried oxycodone in the past and did not like it. She is awaiting to have a biopsy done to determine what her treatment plan will be.     She is  but has a lot of support from her ex . She states that she does not drive and her ex  drives he rto appointments. She has two daughters who check in with her. She also has a very close friend Lacey who she relies on for support. She has a brother who is supportive of her as well. 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           Review of Systems   All other systems reviewed and are negative.        Past Medical History:   Diagnosis Date    Allergic 2022    Allergic to neomiacin and cephalexin    Cataract Nov. 2023    Due to have durgery June 2024    Essential thrombocytosis (HCC)     controlled with medication    Hyperlipidemia     Hypertension     Mass in chest     Nodular goiter     Pneumonia Oct 16, 2023    Also  Feb 2024    Psoriasis     SIRS (systemic inflammatory response syndrome) (HCC) 06/22/2024     Past Surgical History:   Procedure Laterality Date    APPENDECTOMY      BREAST CYST EXCISION Right 2009    COLONOSCOPY  10/31/2018    DXA PROCEDURE (HISTORICAL)  04/21/2017    MAMMO (HISTORICAL)      8-24-18    MAMMO NEEDLE LOCALIZATION RIGHT (ALL INC) Right 07/13/2009    SKIN LESION EXCISION      bridge of nose     Social History     Socioeconomic History    Marital status:      Spouse name: Not on file    Number of children: Not on file    Years of education: Not on file    Highest education level: Not on file   Occupational History    Not on file   Tobacco Use    Smoking status: Former     Current packs/day:  1.00     Average packs/day: 1 pack/day for 58.7 years (58.7 ttl pk-yrs)     Types: Cigarettes     Start date:      Passive exposure: Past    Smokeless tobacco: Never    Tobacco comments:     Quit 2010   Vaping Use    Vaping status: Never Used   Substance and Sexual Activity    Alcohol use: Not Currently    Drug use: Never    Sexual activity: Not Currently     Partners: Male     Birth control/protection: Post-menopausal   Other Topics Concern    Not on file   Social History Narrative    Not on file     Social Determinants of Health     Financial Resource Strain: Low Risk  (2023)    Overall Financial Resource Strain (CARDIA)     Difficulty of Paying Living Expenses: Not hard at all   Food Insecurity: No Food Insecurity (2024)    Hunger Vital Sign     Worried About Running Out of Food in the Last Year: Never true     Ran Out of Food in the Last Year: Never true   Transportation Needs: No Transportation Needs (2024)    PRAPARE - Transportation     Lack of Transportation (Medical): No     Lack of Transportation (Non-Medical): No   Physical Activity: Not on file   Stress: Not on file   Social Connections: Not on file   Intimate Partner Violence: Not on file   Housing Stability: Low Risk  (2024)    Housing Stability Vital Sign     Unable to Pay for Housing in the Last Year: No     Number of Times Moved in the Last Year: 0     Homeless in the Last Year: No     Family History   Problem Relation Age of Onset    Stroke Mother     Depression Mother             Hypertension Mother     Hearing loss Mother     Tuberculosis Father     Cancer Brother         lung    Tuberculosis Brother     Coronary artery disease Brother     Hypertension Brother     No Known Problems Daughter     No Known Problems Daughter     No Known Problems Maternal Grandmother     No Known Problems Maternal Grandfather     No Known Problems Paternal Grandmother     No Known Problems Paternal Grandfather     No Known Problems  "Maternal Aunt     No Known Problems Maternal Aunt     No Known Problems Maternal Aunt     No Known Problems Maternal Aunt     No Known Problems Paternal Aunt     Cancer Brother         Throat cancer       Medications:  all current active meds have been reviewed and current meds:   Current Facility-Administered Medications:     acetaminophen (TYLENOL) tablet 975 mg, Q6H PRN    aspirin chewable tablet 81 mg, Daily    Cholecalciferol (VITAMIN D3) tablet 5,000 Units, Daily    cyanocobalamin (VITAMIN B-12) tablet 1,000 mcg, Daily    dexamethasone (DECADRON) injection 4 mg, Q12H KRISS    diphenhydrAMINE (BENADRYL) tablet 25 mg, Q6H PRN    folic acid (FOLVITE) tablet 1 mg, Daily    levETIRAcetam (KEPPRA) tablet 750 mg, BID    levothyroxine tablet 50 mcg, Early Morning    lidocaine (LIDODERM) 5 % patch 1 patch, Daily    melatonin tablet 3 mg, HS    oxyCODONE (ROXICODONE) IR tablet 5 mg, Q4H PRN    pantoprazole (PROTONIX) EC tablet 40 mg, Daily Before Breakfast    saccharomyces boulardii (FLORASTOR) capsule 250 mg, BID    Facility-Administered Medications Ordered in Other Encounters:     fentaNYL injection, PRN    midazolam (VERSED) injection, PRN    Allergies   Allergen Reactions    Doxycycline Headache    Keflex [Cephalexin] Rash    Neomycin-Polymyxin-Dexameth Eye Swelling       Objective:  /69   Pulse 62   Temp 98.6 °F (37 °C)   Resp 18   Ht 5' 2\" (1.575 m)   Wt 50.5 kg (111 lb 5.3 oz)   SpO2 97%   BMI 20.36 kg/m²     Physical Exam  Vitals and nursing note reviewed.   Constitutional:       General: She is not in acute distress.  HENT:      Head: Normocephalic and atraumatic.   Eyes:      General:         Right eye: No discharge.         Left eye: No discharge.   Cardiovascular:      Rate and Rhythm: Bradycardia present.   Pulmonary:      Effort: Pulmonary effort is normal. No respiratory distress.   Musculoskeletal:         General: Normal range of motion.      Cervical back: Normal range of motion.   Skin:     " "General: Skin is warm and dry.      Coloration: Skin is not jaundiced.   Neurological:      General: No focal deficit present.      Mental Status: She is alert and oriented to person, place, and time. Mental status is at baseline.   Psychiatric:         Mood and Affect: Mood normal.         Behavior: Behavior normal.         Thought Content: Thought content normal.         Judgment: Judgment normal.           Lab Results: I have personally reviewed pertinent labs., CBC:   Lab Results   Component Value Date    WBC 4.84 09/12/2024    HGB 10.3 (L) 09/12/2024    HCT 31.4 (L) 09/12/2024     (H) 09/12/2024     09/12/2024    RBC 3.12 (L) 09/12/2024    MCH 33.0 09/12/2024    MCHC 32.8 09/12/2024    RDW 14.5 09/12/2024    MPV 8.2 (L) 09/12/2024    NRBC 0 09/12/2024   , CMP:   Lab Results   Component Value Date    SODIUM 140 09/12/2024    K 3.9 09/12/2024     09/12/2024    CO2 29 09/12/2024    BUN 19 09/12/2024    CREATININE 0.85 09/12/2024    CALCIUM 8.8 09/12/2024    EGFR 68 09/12/2024     Imaging Studies: I have personally reviewed pertinent reports.  EKG, Pathology, and Other Studies: I have personally reviewed pertinent reports.    Counseling / Coordination of Care  Total floor / unit time spent today 60 minutes. Greater than 50% of total time was spent with the patient and / or family counseling and / or coordination of care. A description of the counseling / coordination of care: Reviewed chart,  provided medical updates, determined goals of care, discussed palliative care and symptom management,, discussed code status, provided anticipatory guidance, determined competency and POA/HCA, determined social/family support, provided psychosocial support.        Portions of this document may have been created using dictation software and as such some \"sound alike\" terms may have been generated by the system. Do not hesitate to contact me with any questions or clarifications.    "

## 2024-09-11 NOTE — PROGRESS NOTES
Progress Note - Hospitalist   Name: Ayla Nye 73 y.o. female I MRN: [id number]  Unit/Bed#: S -01 I Date of Admission: 9/9/2024   Date of Service: 9/11/2024 I Hospital Day: 1    Assessment & Plan  Altered mental status    Pt last admitted 8/21 for confusion and disorientation following CRT for adenocarcinoma of lungs with brain mets. Pt discharged on steroid taper and was on last day of taper (1 mg daily) when she presented for this ED admission. Pt had initially improved on her taper course following her last hospitalization but began to have spatial disorientation, confusion of where she left objects, apraxia when she transitioned from 2 mg to 1 mg dose.  CT H 9/9 : CT Brain: Vasogenic edema left parietal and left occipital lobes similar to prior study in attributed to history of brain metastasis. Mass effect on the posterior aspect of the left lateral ventricle unchanged.  Labs noncontributory to her confusion. US unremarkable and electrolytes wnl.    AMS with spatial disorientation, apraxia, visual field deficits with decreased right peripheral vision is likely 2/2 to vasogenic edema of left parietal and left occipital lobes    Patient AAOx4 today and endorses improvement in confusion since starting 4 mg IV BID Dexamethasone dose inpatient    MRI Brain 9/10 shows new tiny left temporal lobe metastatic lesion. Unchanged left paramedian parietal lobe and left occipital lobe metastatic lesions - largest in left occipital lobe. Unchanged nearly symmetric enhancement along the course of bilateral cranial nerve VII in both internal auditory canals, suspicious for leptomeningeal metastasis. Differential includes bilateral Bell's palsy. Persistent diffuse vasogenic edema in left parietal, left occipital, and left posterior temporal lobes with mild mass effect on posterior aspect of left lateral ventricle, similar to recent CT heads but worsened since MRI brain 7/30/2024    Plan:  Radiation oncology  consulted  Continue dexamethasone 4 mg Q12 hours for the rest of this week. Then taper to 4 mg in a.m. and 2 mg in p.m. for all of next week. The following week, taper to 2 mg in the a.m. and 2 mg in p.m. for that week. Finally, taper to 2 mg daily until repeat MRI in October.  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 Rad onc recs lumbar puncture with CSF analysis and cytology to rule-out leptomeningeal disease. To be done under general anesthesia tomorrow when pt goes does for right hip biopsy.    Abnormal imaging of central nervous system  CT H 9/9 : CT Brain: Vasogenic edema left parietal and left occipital lobes similar to prior study in attributed to history of brain metastasis. Mass effect on the posterior aspect of the left lateral ventricle unchanged.    CT Angiography 9/9:  Unremarkable CTA neck and brain.    MRI Brain w wo contrast 9/10:   - New tiny left temporal lobe metastatic lesion.  - Unchanged left paramedian parietal lobe and left occipital lobe metastatic lesions - largest in left occipital lobe.  - Unchanged nearly symmetric enhancement along the course of bilateral cranial nerve VII in both internal auditory canals, suspicious for leptomeningeal metastasis. Differential includes bilateral Bell's palsy.  - Slightly decreased size of left anterolateral frontal lobe, left paramedian frontal lobe, and cerebellar vermis metastatic lesions.  - Persistent diffuse vasogenic edema in left parietal, left occipital, and left posterior temporal lobes with mild mass effect on posterior aspect of left lateral ventricle, similar to recent CT heads but worsened since MRI brain 7/30/2024    Plan:  See assessment and plan for the principal problem  Right hip pain  Pt has history of right hip pain, which she continues to endorse during this hospitalization.  MRI pelvis bony wo and w contrast 8/30/2024: Enhancing soft tissue lesion in the right  ischioanal fat measuring 3.8 x 2.6 x 3.4 cm, increased in size from 8/5/2024 and 7/3/2024. Findings are suspicious for a malignant lesion, likely a metastasis from patient's lung cancer.  Per heme onc, this may be a site of metastatic disease. Pt had biopsy ordered last month that is scheduled for end of this month.  Pt has previously tried oxycodone for pain but does not like the way it makes her feel.  Tylenol 975 mg inpatient provided relief for around 5 hours, around which time the pain returns.    Plan:  Right hip biopsy scheduled for tomorrow with IR under general anesthesia. If pathology confirms metastatic disease, may be candidate for outpatient palliative radiation therapy.  Palliative therapy consulted. Rec lidocaine patch, which has been ordered for patient. Awaiting formal recs.  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   Follows with Dr. Castro outpatient.  Treated with chemoradiotherapy therapy. CRT session conducted for brain mets  Pt states she has immunotherapy outpatient scheudled for 9/16/2024  Palliative care consulted for goals of care  Goals of care, counseling/discussion  Palliative care consulted for goals of care counseling with pt. Formal assessment and recs to follow.    VTE Pharmacologic Prophylaxis: VTE Score: 7 High Risk (Score >/= 5) - Pharmacological DVT Prophylaxis Ordered: enoxaparin (Lovenox). Sequential Compression Devices Ordered.    Mobility:   Basic Mobility Inpatient Raw Score: 24  JH-HLM Goal: 8: Walk 250 feet or more  JH-HLM Achieved: 7: Walk 25 feet or more  JH-HLM Goal NOT achieved. Continue with multidisciplinary rounding and encourage appropriate mobility to improve upon JH-HLM goals.    Patient Centered Rounds:  I performed bedside rounds with nursing staff today.   Discussions with Specialists or Other Care Team Provider: Interventional radiology, Oncology-Radiation, Palliative Care    Education and Discussions with Family / Patient: Updated  (daughter) via phone.    Current Length of Stay: 1 day(s)  Current Patient Status: Inpatient   Certification Statement: The patient will continue to require additional inpatient hospital stay due to right hip biopsy and lumbar puncture tomorrow  Discharge Plan: Anticipate discharge in 24-48 hrs to home.    Code Status: Level 1 - Full Code    Subjective   No overnight events. Spoke with pt at bedside. She appeared comfortable, sitting up in bed. Pt endorses decreased confusion and disorientation this morning. Pt continues to endorse dull, aching right hip pain and is scheduled for right hip biopsy tomorrow. Pt received Tylenol yesterday and last night, which provided her relief for about five hours at which time her right hip pain returned. Spoke with patient about reaching out to palliative care for pain management and she is agreeable. Also spoke with patient about her brain MRI results and she expressed understanding. Rest of ROS unremarkable.    Objective     Vitals:   Temp (24hrs), Av.1 °F (36.7 °C), Min:97.8 °F (36.6 °C), Max:98.3 °F (36.8 °C)    Temp:  [97.8 °F (36.6 °C)-98.3 °F (36.8 °C)] 98.2 °F (36.8 °C)  HR:  [58-74] 66  Resp:  [18] 18  BP: (120-131)/(66-77) 127/71  SpO2:  [95 %-97 %] 96 %  Body mass index is 20.36 kg/m².     Input and Output Summary (last 24 hours):     Intake/Output Summary (Last 24 hours) at 2024 1442  Last data filed at 2024 0736  Gross per 24 hour   Intake 480 ml   Output --   Net 480 ml       Physical Exam  Constitutional:       General: She is not in acute distress.  HENT:      Head: Normocephalic.   Cardiovascular:      Rate and Rhythm: Normal rate and regular rhythm.      Heart sounds: No murmur heard.     No friction  rub. No gallop.   Pulmonary:      Effort: Pulmonary effort is normal.      Breath sounds: Normal breath sounds.   Abdominal:      General: Abdomen is flat. Bowel sounds are normal. There is no distension.      Palpations: Abdomen is soft.      Tenderness: There is no abdominal tenderness. There is no guarding or rebound.   Musculoskeletal:      Right lower leg: No edema.      Left lower leg: No edema.   Neurological:      Mental Status: She is alert and oriented to person, place, and time.   Psychiatric:         Mood and Affect: Mood normal.         Behavior: Behavior normal.          Lines/Drains:  Lines/Drains/Airways       Active Status       None                            Lab Results: I have reviewed the following results: CBC/BMP:   .     09/11/24  0329   WBC 6.30   HGB 9.8*   HCT 30.2*      SODIUM 138   K 4.2      CO2 26   BUN 19   CREATININE 0.88   GLUC 134       Results from last 7 days   Lab Units 09/11/24  0329 09/10/24  0638   WBC Thousand/uL 6.30 3.81*   HEMOGLOBIN g/dL 9.8* 9.7*   HEMATOCRIT % 30.2* 29.4*   PLATELETS Thousands/uL 209 217   SEGS PCT %  --  85*   LYMPHO PCT % 4* 6*   MONO PCT % 3* 8   EOS PCT % 0 0     Results from last 7 days   Lab Units 09/11/24  0329 09/10/24  0458   SODIUM mmol/L 138 137   POTASSIUM mmol/L 4.2 4.2   CHLORIDE mmol/L 108 104   CO2 mmol/L 26 25   BUN mg/dL 19 14   CREATININE mg/dL 0.88 0.83   ANION GAP mmol/L 4 8   CALCIUM mg/dL 8.8 8.8   ALBUMIN g/dL  --  3.5   TOTAL BILIRUBIN mg/dL  --  0.33   ALK PHOS U/L  --  40   ALT U/L  --  23   AST U/L  --  17   GLUCOSE RANDOM mg/dL 134 117     Results from last 7 days   Lab Units 09/09/24  1148   INR  0.85                   Recent Cultures (last 7 days):         Imaging Review: Reviewed radiology reports from this admission including: CT head and MRI brain.  MRI Brain BT w wo Contrast   Final Result by Raphael Baron MD (09/11 0736)      Mixed treatment response.   - New tiny left temporal lobe metastatic  lesion.   - Unchanged left paramedian parietal lobe and left occipital lobe metastatic lesions - largest in left occipital lobe.   - Unchanged nearly symmetric enhancement along the course of bilateral cranial nerve VII in both internal auditory canals, suspicious for leptomeningeal metastasis. Differential includes bilateral Bell's palsy.   - Slightly decreased size of left anterolateral frontal lobe, left paramedian frontal lobe, and cerebellar vermis metastatic lesions.   - Persistent diffuse vasogenic edema in left parietal, left occipital, and left posterior temporal lobes with mild mass effect on posterior aspect of left lateral ventricle, similar to recent CT heads but worsened since MRI brain 7/30/2024.      The study was marked in EPIC for immediate notification.      Workstation performed: [id number]         CTA head and neck with and without contrast   Final Result by Jhonny Rodriguez DO (09/09 1538)      CT Brain: Vasogenic edema left parietal and left occipital lobes similar to prior study in attributed to history of brain metastasis. Mass effect on the posterior aspect of the left lateral ventricle unchanged.      CT Angiography:  Unremarkable CTA neck and brain.                  Workstation performed: [id number]         IR biopsy other    (Results Pending)   IR lumbar puncture    (Results Pending)      Other Studies: No additional pertinent studies reviewed.    Last 24 Hours Medication List:     Current Facility-Administered Medications:     acetaminophen (TYLENOL) tablet 975 mg, Q6H PRN    aspirin chewable tablet 81 mg, Daily    Cholecalciferol (VITAMIN D3) tablet 5,000 Units, Daily    cyanocobalamin (VITAMIN B-12) tablet 1,000 mcg, Daily    dexamethasone (DECADRON) injection 4 mg, Q12H KRISS    diphenhydrAMINE (BENADRYL) tablet 25 mg, Q6H PRN    enoxaparin (LOVENOX) subcutaneous injection 40 mg, Daily    folic acid (FOLVITE) tablet 1 mg, Daily    levETIRAcetam (KEPPRA) tablet 750 mg, BID     levothyroxine tablet 50 mcg, Early Morning    lidocaine (LIDODERM) 5 % patch 1 patch, Once    melatonin tablet 3 mg, HS    oxyCODONE (ROXICODONE) IR tablet 5 mg, Q4H PRN    pantoprazole (PROTONIX) EC tablet 40 mg, Daily Before Breakfast    saccharomyces boulardii (FLORASTOR) capsule 250 mg, BID    Administrative Statements   Today, Patient Was Seen By: Marsha Miller MD  I have spent a total time of 30 minutes in caring for this patient on the day of the visit/encounter including Patient and family education, Counseling / Coordination of care, Documenting in the medical record, Reviewing / ordering tests, medicine, procedures  , Obtaining or reviewing history  , and Communicating with other healthcare professionals .    **Please Note: This note may have been constructed using a voice recognition system.**

## 2024-09-11 NOTE — PLAN OF CARE
Problem: PAIN - ADULT  Goal: Verbalizes/displays adequate comfort level or baseline comfort level  Description: Interventions:  - Encourage patient to monitor pain and request assistance  - Assess pain using appropriate pain scale  - Administer analgesics based on type and severity of pain and evaluate response  - Implement non-pharmacological measures as appropriate and evaluate response  - Consider cultural and social influences on pain and pain management  - Notify physician/advanced practitioner if interventions unsuccessful or patient reports new pain  Outcome: Progressing     Problem: INFECTION - ADULT  Goal: Absence or prevention of progression during hospitalization  Description: INTERVENTIONS:  - Assess and monitor for signs and symptoms of infection  - Monitor lab/diagnostic results  - Monitor all insertion sites, i.e. indwelling lines, tubes, and drains  - Monitor endotracheal if appropriate and nasal secretions for changes in amount and color  - [place] appropriate cooling/warming therapies per order  - Administer medications as ordered  - Instruct and encourage patient and family to use good hand hygiene technique  - Identify and instruct in appropriate isolation precautions for identified infection/condition  Outcome: Progressing     Problem: DISCHARGE PLANNING  Goal: Discharge to home or other facility with appropriate resources  Description: INTERVENTIONS:  - Identify barriers to discharge w/patient and caregiver  - Arrange for needed discharge resources and transportation as appropriate  - Identify discharge learning needs (meds, wound care, etc.)  - Arrange for interpretive services to assist at discharge as needed  - Refer to Case Management Department for coordinating discharge planning if the patient needs post-hospital services based on physician/advanced practitioner order or complex needs related to functional status, cognitive ability, or social support system  Outcome: Progressing      Problem: Knowledge Deficit  Goal: Patient/family/caregiver demonstrates understanding of disease process, treatment plan, medications, and discharge instructions  Description: Complete learning assessment and assess knowledge base.  Interventions:  - Provide teaching at level of understanding  - Provide teaching via preferred learning methods  Outcome: Progressing     Problem: NEUROSENSORY - ADULT  Goal: Achieves stable or improved neurological status  Description: INTERVENTIONS  - Monitor and report changes in neurological status  - Monitor vital signs such as temperature, blood pressure, glucose, and any other labs ordered   - Initiate measures to prevent increased intracranial pressure  - Monitor for seizure activity and implement precautions if appropriate      Outcome: Progressing

## 2024-09-11 NOTE — ASSESSMENT & PLAN NOTE
Pt last admitted 8/21 for confusion and disorientation following CRT for adenocarcinoma of lungs with brain mets. Pt discharged on steroid taper and was on last day of taper (1 mg daily) when she presented for this ED admission. Pt had initially improved on her taper course following her last hospitalization but began to have spatial disorientation, confusion of where she left objects, apraxia when she transitioned from 2 mg to 1 mg dose.  CT H 9/9 : CT Brain: Vasogenic edema left parietal and left occipital lobes similar to prior study in attributed to history of brain metastasis. Mass effect on the posterior aspect of the left lateral ventricle unchanged.  Labs noncontributory to her confusion. US unremarkable and electrolytes wnl.    AMS with spatial disorientation, apraxia, visual field deficits with decreased right peripheral vision is likely 2/2 to vasogenic edema of left parietal and left occipital lobes    Patient AAOx4 today and endorses improvement in confusion since starting 4 mg IV BID Dexamethasone dose inpatient    MRI Brain 9/10 shows new tiny left temporal lobe metastatic lesion. Unchanged left paramedian parietal lobe and left occipital lobe metastatic lesions - largest in left occipital lobe. Unchanged nearly symmetric enhancement along the course of bilateral cranial nerve VII in both internal auditory canals, suspicious for leptomeningeal metastasis. Differential includes bilateral Bell's palsy. Persistent diffuse vasogenic edema in left parietal, left occipital, and left posterior temporal lobes with mild mass effect on posterior aspect of left lateral ventricle, similar to recent CT heads but worsened since MRI brain 7/30/2024    Plan:  Radiation oncology consulted  Continue dexamethasone 4 mg Q12 hours for the rest of this week. Then taper to 4 mg in a.m. and 2 mg in p.m. for all of next week. The following week, taper to 2 mg in the a.m. and 2 mg in p.m. for that week. Finally, taper to 2  mg daily until repeat MRI in October.  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 Rad onc recs lumbar puncture with CSF analysis and cytology to rule-out leptomeningeal disease. To be done under general anesthesia tomorrow when pt goes does for right hip biopsy.

## 2024-09-11 NOTE — PLAN OF CARE
Problem: PAIN - ADULT  Goal: Verbalizes/displays adequate comfort level or baseline comfort level  Description: Interventions:  - Encourage patient to monitor pain and request assistance  - Assess pain using appropriate pain scale  - Administer analgesics based on type and severity of pain and evaluate response  - Implement non-pharmacological measures as appropriate and evaluate response  - Consider cultural and social influences on pain and pain management  - Notify physician/advanced practitioner if interventions unsuccessful or patient reports new pain  Outcome: Progressing     Problem: INFECTION - ADULT  Goal: Absence or prevention of progression during hospitalization  Description: INTERVENTIONS:  - Assess and monitor for signs and symptoms of infection  - Monitor lab/diagnostic results  - Monitor all insertion sites, i.e. indwelling lines, tubes, and drains  - Monitor endotracheal if appropriate and nasal secretions for changes in amount and color  - [place] appropriate cooling/warming therapies per order  - Administer medications as ordered  - Instruct and encourage patient and family to use good hand hygiene technique  - Identify and instruct in appropriate isolation precautions for identified infection/condition  Outcome: Progressing     Problem: DISCHARGE PLANNING  Goal: Discharge to home or other facility with appropriate resources  Description: INTERVENTIONS:  - Identify barriers to discharge w/patient and caregiver  - Arrange for needed discharge resources and transportation as appropriate  - Identify discharge learning needs (meds, wound care, etc.)  - Arrange for interpretive services to assist at discharge as needed  - Refer to Case Management Department for coordinating discharge planning if the patient needs post-hospital services based on physician/advanced practitioner order or complex needs related to functional status, cognitive ability, or social support system  Outcome: Progressing      Problem: Knowledge Deficit  Goal: Patient/family/caregiver demonstrates understanding of disease process, treatment plan, medications, and discharge instructions  Description: Complete learning assessment and assess knowledge base.  Interventions:  - Provide teaching at level of understanding  - Provide teaching via preferred learning methods  Outcome: Progressing     Problem: NEUROSENSORY - ADULT  Goal: Achieves stable or improved neurological status  Description: INTERVENTIONS  - Monitor and report changes in neurological status  - Monitor vital signs such as temperature, blood pressure, glucose, and any other labs ordered   - Initiate measures to prevent increased intracranial pressure  - Monitor for seizure activity and implement precautions if appropriate      Outcome: Progressing

## 2024-09-11 NOTE — ASSESSMENT & PLAN NOTE
CT H 9/9 : CT Brain: Vasogenic edema left parietal and left occipital lobes similar to prior study in attributed to history of brain metastasis. Mass effect on the posterior aspect of the left lateral ventricle unchanged.    CT Angiography 9/9:  Unremarkable CTA neck and brain.    MRI Brain w wo contrast 9/10:   - New tiny left temporal lobe metastatic lesion.  - Unchanged left paramedian parietal lobe and left occipital lobe metastatic lesions - largest in left occipital lobe.  - Unchanged nearly symmetric enhancement along the course of bilateral cranial nerve VII in both internal auditory canals, suspicious for leptomeningeal metastasis. Differential includes bilateral Bell's palsy.  - Slightly decreased size of left anterolateral frontal lobe, left paramedian frontal lobe, and cerebellar vermis metastatic lesions.  - Persistent diffuse vasogenic edema in left parietal, left occipital, and left posterior temporal lobes with mild mass effect on posterior aspect of left lateral ventricle, similar to recent CT heads but worsened since MRI brain 7/30/2024    Plan:  See assessment and plan for the principal problem

## 2024-09-11 NOTE — QUICK NOTE
Please refer to consult from yesterday dated 9/10/2024 for a full history.    The patient underwent repeat MRI of the brain yesterday evening.  The 5 previously treated intracranial metastatic lesions are stable, with increased vasogenic edema around the left parieto-occipital lesion compared to her pretreatment MRI 1 month ago.  There is a new punctate lesion in the left temporal lobe.  Note was also made of enhancement of the bilateral 7th cranial nerves.    Symptomatically, her confusion has essentially resolved on dexamethasone 4 mg twice daily.  We would recommend that she remain on 4 mg twice daily through the rest of this week.  She should then taper down to 4 mg in the a.m. and 2 mg in the p.m. and stay on that dose all of next week.  The following week she can taper down to 2 mg in the a.m. and 2 mg in the p.m. for 1 week, and then down to 2 mg daily until she has her new MRI in October and follows up in our department shortly thereafter.    The new punctate lesion in the left temporal lobe will be reassessed on her next MRI which is already scheduled for early October and would likely be amenable to outpatient stereotactic radiation.  In regards to the enhancement of the bilateral 7th nerves, which raises the possibility of leptomeningeal carcinomatosis, her MRI was reviewed this morning at the neuro-oncology working group and the enhancement of the 7th nerves has been present on her prior MRI in July as well as her initial MRI of the brain in March.  Nonetheless, the recommendation is for a lumbar puncture with CSF analysis and cytology to rule out leptomeningeal disease.  We have requested this to be ordered by the inpatient team.    She is also scheduled for an ultrasound-guided biopsy of a painful right ischioanal fossa soft tissue mass.  We will follow-up on the pathology and if this confirms metastatic disease then she would be a candidate for palliative radiation therapy which can be performed as an  outpatient.    Once she has undergone biopsy of the ischioanal fossa mass, and undergone LP, she can be discharged from our standpoint with the above steroid taper schedule.

## 2024-09-11 NOTE — TELEMEDICINE
e-Consult (IPC)  - Interventional Radiology  Ayla Nye 73 y.o. female MRN: [id number]  Unit/Bed#: S -01 Encounter: [id number]          Interventional Radiology has been consulted to evaluate Ayla Nye    We were consulted by SKINNY concerning this patient with a painful ischial fossa mass.    Inpatient Consult to IR  Consult performed by: Belgica Morales MD  Consult ordered by: Elizabeth Castelan MD        09/11/24    Assessment/Recommendation:   73-year-old female with a history of NSCLC, had recently been discovered to have a painful and growing right ischial fossa mass.  Patient was scheduled for an outpatient biopsy of this lesion, however, presented to the hospital yesterday with complaints of fogginess and incoordination.    Her CT on admission demonstrated brain mass with vasogenic edema.  She is being treated for this lesion, and a biopsy of the ischial fossa mass has been requested while the patient is an inpatient with anesthesia.    Because the patient ate breakfast, the procedure will be scheduled for tomorrow when breakfast can be held and she can have anesthesia support for her procedure.  It is not necessary to hold her aspirin, as this lesion is in a very low risk location for biopsy.    11-20 minutes, >50% of the total time devoted to medical consultative verbal/EMR discussion between providers. Written report will be generated in the EMR.     Thank you for allowing Interventional Radiology to participate in the care of Ayla Nye. Please don't hesitate to call or TigerText us with any questions.     Belgica Morales MD

## 2024-09-11 NOTE — CASE MANAGEMENT
Case Management Assessment & Discharge Planning Note    Patient name Ayla Nye  Location S /S -01 MRN [id number]  : 1950 Date 2024       Current Admission Date: 2024  Current Admission Diagnosis:Altered mental status   Patient Active Problem List    Diagnosis Date Noted Date Diagnosed    Right hip pain 09/10/2024     Altered mental status 2024     Hypothyroidism 2024     Abnormal imaging of central nervous system 2024     Acute metabolic encephalopathy 2024     Malignant neoplasm metastatic to brain (HCC) 2024     Brain mass 2024     Metastatic cancer to brain (HCC) 2024     Visual disturbance 2024     Dehydration 2024     Moderate protein-calorie malnutrition (HCC) 2024     Bilateral leg pain 2024     Insomnia 2024     Malignant neoplasm of upper lobe, right bronchus or lung (HCC) 2024     Age-related osteoporosis without current pathological fracture 2023     Encounter for monitoring of hydroxyurea therapy 2023     JAK2 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 2023     Hypertension 08/10/2022     Mixed hyperlipidemia 08/10/2022     Essential thrombocytosis (HCC) 2020     TB lung, 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     Psoriatic arthritis (HCC) 09/10/2019       LOS (days): 1  Geometric Mean LOS (GMLOS) (days):   Days to GMLOS:     OBJECTIVE:  PATIENT READMITTED TO HOSPITAL  Risk of Unplanned Readmission Score: 26.81         Current admission status: Inpatient       Preferred Pharmacy:   [id number] [place], [place] - [id number] [facility] [address]  Phone: [phone] Fax: [phone]    Primary Care Provider: Rafy Angelo MD    Primary Insurance: MEDICARE  Secondary Insurance: AARP    ASSESSMENT:  Active Health Care Proxies    There are no active Health Care Proxies on file.                 Readmission Root Cause  30 Day Readmission: Yes  Who  directed you to return to the hospital?: Self  Did you understand whom to contact if you had questions or problems?: Yes  Did you get your prescriptions before you left the hospital?: Yes  Were you able to get your prescriptions filled when you left the hospital?: Yes  Did you take your medications as prescribed?: Yes  Were you able to get to your follow-up appointments?: Yes  During previous admission, was a post-acute recommendation made?: Yes  What post-acute resources were offered?: HHC (Pt reported when she got home HHC agency stated she did not meet criteria)  Patient was readmitted due to: Altered mental status  Action Plan: Radiation oncologist consult    Patient Information  Admitted from:: Home  Mental Status: Alert  During Assessment patient was accompanied by: Not accompanied during assessment  Assessment information provided by:: Patient  Primary Caregiver: Self  Support Systems: Self, Children, Friend  County of Residence: [place]  What city do you live in?: [place]  Home entry access options. Select all that apply.: Stairs  Number of steps to enter home.: 6  Type of Current Residence: 2 story home  Upon entering residence, is there a bedroom on the main floor (no further steps)?: No  A bedroom is located on the following floor levels of residence (select all that apply):: 2nd Floor  Upon entering residence, is there a bathroom on the main floor (no further steps)?: No  Indicate which floors of current residence have a bathroom (select all the apply):: 2nd Floor  Number of steps to 2nd floor from main floor: One Flight  Living Arrangements: Lives Alone    Activities of Daily Living Prior to Admission  Functional Status: Independent  Completes ADLs independently?: Yes  Ambulates independently?: Yes  Does patient use assisted devices?: No  Does patient currently own DME?: Yes  What DME does the patient currently own?: Straight Cane  Does patient have a history of Outpatient Therapy (PT/OT)?: No  Does  the patient have a history of Short-Term Rehab?: No  Does patient have a history of HHC?: Yes  Does patient currently have HHC?: No         Patient Information Continued  Income Source: Pension/alf  Does patient have prescription coverage?: Yes  Does patient receive dialysis treatments?: No  Does patient have a history of substance abuse?: No  Does patient have a history of Mental Health Diagnosis?: No    PHQ 2/9 Screening   Reviewed PHQ 2/9 Depression Screening Score?: No    Means of Transportation  Means of Transport to Appts:: Family transport      Social Determinants of Health (SDOH)      Flowsheet Row Most Recent Value   Housing Stability    In the last 12 months, was there a time when you were not able to pay the mortgage or rent on time? N   In the past 12 months, how many times have you moved where you were living? 0   At any time in the past 12 months, were you homeless or living in a shelter (including now)? N   Transportation Needs    In the past 12 months, has lack of transportation kept you from medical appointments or from getting medications? no   In the past 12 months, has lack of transportation kept you from meetings, work, or from getting things needed for daily living? No   Food Insecurity    Within the past 12 months, you worried that your food would run out before you got the money to buy more. Never true   Within the past 12 months, the food you bought just didn't last and you didn't have money to get more. Never true   Utilities    In the past 12 months has the electric, gas, oil, or water company threatened to shut off services in your home? No            DISCHARGE DETAILS:    Discharge planning discussed with:: Patient  Freedom of Choice: Yes  Comments - Freedom of Choice: Preference is to return home  CM contacted family/caregiver?: No- see comments (Pt A&O)  Were Treatment Team discharge recommendations reviewed with patient/caregiver?: Yes  Did patient/caregiver verbalize  "understanding of patient care needs?: Yes  Were patient/caregiver advised of the risks associated with not following Treatment Team discharge recommendations?: Yes    Contacts  Patient Contacts: Ayla  Contact Method: In Person  Reason/Outcome: Continuity of Care, Discharge Planning    Requested Home Health Care         Is the patient interested in HHC at discharge?: No    DME Referral Provided  Referral made for DME?: No    Other Referral/Resources/Interventions Provided:  Interventions: Other (Specify)  Referral Comments: Pt noted to be a 30 day readmission. CM spoke with pt at bedside, introduced self and role with discharge planning. Pt denied issues with getting medications or getting to f/u appointments. Pt reported having a good support system. Pt reported she came back to the hospital because she was dizzy and was confused -- \"same as last time.\" Pt reported she lives by herself in a  2 story home with about 6 HARRY. Pt reported bed/bath are on the 2nd floor. Pt reported being fully IPTA without AD. Pt reported she does have a cane if needed. Pt denied hx of OP PT and STR. Pt reported she has a hx of HHC -- was set up with HHC when leaving the hospital alst admission, however during initial assessment at home was told she did not meet criteria for HHC. Pt reported her preferred pharamcy is [facility] and PCP is Rafy Angelo MD. Pt reported her family and friends assist with transportation needs. CM will continue to follow.    Discharge Destination Plan:: Home    "

## 2024-09-11 NOTE — ASSESSMENT & PLAN NOTE
Goals of care  Level 1 code status  Disease focused care without limits.   Will continue discussions regarding GOC as patient's clinical presentation evolves.  Encouraged follow up with Palliative Medicine on an outpatient basis after discharge for continued symptom management.  Our office will contact patient to schedule a hospital follow up.    Social support:  Patient is finding comfort support of family  Supportive listening provided  Normalized experience of patient/family  Provided anxiety containment  Provided anticipatory guidance  Encouraged self care    Follow up  Palliative Care will continue to follow and goals of care discussions will be ongoing.    Please reach out via [employer] Secure Chat if questions or concerns arise.    I have reviewed the patient's controlled substance dispensing history in the Prescription Drug Monitoring Program in compliance with the [facility] regulations before prescribing any controlled substances.  Last refills     Decisional apparatus:  Patient has capacity on exam today.  If capacity is lost, patient's substitute decision maker would default to  adult daughters by [place] Act 169.  Advance Directive/Living Will, POLST and POA Forms: Reviewed and on file.  ER contacts:  Name Relation Home Work Mobile   Juliana Camarena Daughter   [phone]     Name Relation Home Work Mobile   Sue Antony Daughter   [phone]       We appreciate the invitation to be involved in this patient's care.  We will continue to follow throughout this hospitalization.  Please do not hesitate to reach our on call provider through our clinic answering service at [phone] should you have acute symptom control concerns.    Lisy JAIN, CRNP  Palliative and Supportive Care  Clinic/Answering Service: [phone]  You can find me on [employer]!

## 2024-09-11 NOTE — ASSESSMENT & PLAN NOTE
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   Follows with Dr. Castro outpatient.  Treated with chemoradiotherapy therapy. CRT session conducted for brain mets  Pt states she has immunotherapy outpatient scheudled for 9/16/2024  Palliative care consulted for goals of care

## 2024-09-11 NOTE — ASSESSMENT & PLAN NOTE
Pt has history of right hip pain, which she continues to endorse during this hospitalization.  MRI pelvis bony wo and w contrast 8/30/2024: Enhancing soft tissue lesion in the right ischioanal fat measuring 3.8 x 2.6 x 3.4 cm, increased in size from 8/5/2024 and 7/3/2024. Findings are suspicious for a malignant lesion, likely a metastasis from patient's lung cancer.  Per heme onc, this may be a site of metastatic disease. Pt had biopsy ordered last month that is scheduled for end of this month.  Pt has previously tried oxycodone for pain but does not like the way it makes her feel.  Tylenol 975 mg inpatient provided relief for around 5 hours, around which time the pain returns.    Plan:  Right hip biopsy scheduled for tomorrow with IR under general anesthesia. If pathology confirms metastatic disease, may be candidate for outpatient palliative radiation therapy.  Palliative therapy consulted. Rec lidocaine patch, which has been ordered for patient. Awaiting formal recs.

## 2024-09-12 ENCOUNTER — TRANSITIONAL CARE MANAGEMENT (OUTPATIENT)
Age: 74
End: 2024-09-12

## 2024-09-12 ENCOUNTER — APPOINTMENT (INPATIENT)
Dept: RADIOLOGY | Facility: HOSPITAL | Age: 74
DRG: 987 | End: 2024-09-12
Attending: RADIOLOGY
Payer: MEDICARE

## 2024-09-12 VITALS
HEIGHT: 62 IN | BODY MASS INDEX: 20.49 KG/M2 | OXYGEN SATURATION: 97 % | WEIGHT: 111.33 LBS | HEART RATE: 62 BPM | DIASTOLIC BLOOD PRESSURE: 69 MMHG | TEMPERATURE: 98.6 F | SYSTOLIC BLOOD PRESSURE: 166 MMHG | RESPIRATION RATE: 18 BRPM

## 2024-09-12 PROBLEM — R79.89 ABNORMAL TSH: Status: ACTIVE | Noted: 2024-09-12

## 2024-09-12 PROBLEM — G89.3 CANCER RELATED PAIN: Status: ACTIVE | Noted: 2024-09-12

## 2024-09-12 LAB
ANION GAP SERPL CALCULATED.3IONS-SCNC: 5 MMOL/L (ref 4–13)
APPEARANCE CSF: NORMAL
BASOPHILS # BLD AUTO: 0.01 THOUSANDS/ÂΜL (ref 0–0.1)
BASOPHILS NFR BLD AUTO: 0 % (ref 0–1)
BUN SERPL-MCNC: 19 MG/DL (ref 5–25)
CALCIUM SERPL-MCNC: 8.8 MG/DL (ref 8.4–10.2)
CHLORIDE SERPL-SCNC: 106 MMOL/L (ref 96–108)
CO2 SERPL-SCNC: 29 MMOL/L (ref 21–32)
CREAT SERPL-MCNC: 0.85 MG/DL (ref 0.6–1.3)
EOSINOPHIL # BLD AUTO: 0 THOUSAND/ÂΜL (ref 0–0.61)
EOSINOPHIL NFR BLD AUTO: 0 % (ref 0–6)
ERYTHROCYTE [DISTWIDTH] IN BLOOD BY AUTOMATED COUNT: 14.5 % (ref 11.6–15.1)
GFR SERPL CREATININE-BSD FRML MDRD: 68 ML/MIN/1.73SQ M
GLUCOSE CSF-MCNC: 65 MG/DL (ref 40–70)
GLUCOSE SERPL-MCNC: 97 MG/DL (ref 65–140)
GRAM STN SPEC: NORMAL
GRAM STN SPEC: NORMAL
HCT VFR BLD AUTO: 31.4 % (ref 34.8–46.1)
HGB BLD-MCNC: 10.3 G/DL (ref 11.5–15.4)
IMM GRANULOCYTES # BLD AUTO: 0.04 THOUSAND/UL (ref 0–0.2)
IMM GRANULOCYTES NFR BLD AUTO: 1 % (ref 0–2)
INR PPP: 0.88 (ref 0.85–1.19)
LYMPHOCYTES # BLD AUTO: 0.26 THOUSANDS/ÂΜL (ref 0.6–4.47)
LYMPHOCYTES NFR BLD AUTO: 5 % (ref 14–44)
MCH RBC QN AUTO: 33 PG (ref 26.8–34.3)
MCHC RBC AUTO-ENTMCNC: 32.8 G/DL (ref 31.4–37.4)
MCV RBC AUTO: 101 FL (ref 82–98)
MONOCYTES # BLD AUTO: 0.27 THOUSAND/ÂΜL (ref 0.17–1.22)
MONOCYTES NFR BLD AUTO: 6 % (ref 4–12)
NEUTROPHILS # BLD AUTO: 4.26 THOUSANDS/ÂΜL (ref 1.85–7.62)
NEUTS SEG NFR BLD AUTO: 88 % (ref 43–75)
NRBC BLD AUTO-RTO: 0 /100 WBCS
PLATELET # BLD AUTO: 227 THOUSANDS/UL (ref 149–390)
PMV BLD AUTO: 8.2 FL (ref 8.9–12.7)
POTASSIUM SERPL-SCNC: 3.9 MMOL/L (ref 3.5–5.3)
PROT CSF-MCNC: 73 MG/DL (ref 15–45)
PROTHROMBIN TIME: 12.6 SECONDS (ref 12.3–15)
RBC # BLD AUTO: 3.12 MILLION/UL (ref 3.81–5.12)
RBC # CSF MANUAL: 0 UL (ref 0–10)
SODIUM SERPL-SCNC: 140 MMOL/L (ref 135–147)
TOTAL CELLS COUNTED BLD: NO
TUBE # CSF: 4
WBC # BLD AUTO: 4.84 THOUSAND/UL (ref 4.31–10.16)
WBC # CSF AUTO: 0 /UL (ref 0–5)

## 2024-09-12 PROCEDURE — 88305 TISSUE EXAM BY PATHOLOGIST: CPT | Performed by: PATHOLOGY

## 2024-09-12 PROCEDURE — 76937 US GUIDE VASCULAR ACCESS: CPT

## 2024-09-12 PROCEDURE — 99238 HOSP IP/OBS DSCHRG MGMT 30/<: CPT | Performed by: INTERNAL MEDICINE

## 2024-09-12 PROCEDURE — 88313 SPECIAL STAINS GROUP 2: CPT | Performed by: PATHOLOGY

## 2024-09-12 PROCEDURE — 99153 MOD SED SAME PHYS/QHP EA: CPT

## 2024-09-12 PROCEDURE — 88108 CYTOPATH CONCENTRATE TECH: CPT | Performed by: PATHOLOGY

## 2024-09-12 PROCEDURE — 85610 PROTHROMBIN TIME: CPT | Performed by: STUDENT IN AN ORGANIZED HEALTH CARE EDUCATION/TRAINING PROGRAM

## 2024-09-12 PROCEDURE — 88342 IMHCHEM/IMCYTCHM 1ST ANTB: CPT | Performed by: PATHOLOGY

## 2024-09-12 PROCEDURE — 20206 BIOPSY MUSCLE PERQ NEEDLE: CPT | Performed by: STUDENT IN AN ORGANIZED HEALTH CARE EDUCATION/TRAINING PROGRAM

## 2024-09-12 PROCEDURE — 89051 BODY FLUID CELL COUNT: CPT | Performed by: STUDENT IN AN ORGANIZED HEALTH CARE EDUCATION/TRAINING PROGRAM

## 2024-09-12 PROCEDURE — 009U3ZX DRAINAGE OF SPINAL CANAL, PERCUTANEOUS APPROACH, DIAGNOSTIC: ICD-10-PCS | Performed by: STUDENT IN AN ORGANIZED HEALTH CARE EDUCATION/TRAINING PROGRAM

## 2024-09-12 PROCEDURE — 99152 MOD SED SAME PHYS/QHP 5/>YRS: CPT

## 2024-09-12 PROCEDURE — 82945 GLUCOSE OTHER FLUID: CPT | Performed by: STUDENT IN AN ORGANIZED HEALTH CARE EDUCATION/TRAINING PROGRAM

## 2024-09-12 PROCEDURE — 88341 IMHCHEM/IMCYTCHM EA ADD ANTB: CPT | Performed by: PATHOLOGY

## 2024-09-12 PROCEDURE — 89050 BODY FLUID CELL COUNT: CPT | Performed by: STUDENT IN AN ORGANIZED HEALTH CARE EDUCATION/TRAINING PROGRAM

## 2024-09-12 PROCEDURE — 87070 CULTURE OTHR SPECIMN AEROBIC: CPT | Performed by: STUDENT IN AN ORGANIZED HEALTH CARE EDUCATION/TRAINING PROGRAM

## 2024-09-12 PROCEDURE — 76942 ECHO GUIDE FOR BIOPSY: CPT

## 2024-09-12 PROCEDURE — 80048 BASIC METABOLIC PNL TOTAL CA: CPT | Performed by: STUDENT IN AN ORGANIZED HEALTH CARE EDUCATION/TRAINING PROGRAM

## 2024-09-12 PROCEDURE — 84157 ASSAY OF PROTEIN OTHER: CPT | Performed by: STUDENT IN AN ORGANIZED HEALTH CARE EDUCATION/TRAINING PROGRAM

## 2024-09-12 PROCEDURE — 76942 ECHO GUIDE FOR BIOPSY: CPT | Performed by: STUDENT IN AN ORGANIZED HEALTH CARE EDUCATION/TRAINING PROGRAM

## 2024-09-12 PROCEDURE — 22511 PERQ LUMBOSACRAL INJECTION: CPT

## 2024-09-12 PROCEDURE — 88360 TUMOR IMMUNOHISTOCHEM/MANUAL: CPT | Performed by: PATHOLOGY

## 2024-09-12 PROCEDURE — 0QB23ZX EXCISION OF RIGHT PELVIC BONE, PERCUTANEOUS APPROACH, DIAGNOSTIC: ICD-10-PCS | Performed by: STUDENT IN AN ORGANIZED HEALTH CARE EDUCATION/TRAINING PROGRAM

## 2024-09-12 PROCEDURE — NC001 PR NO CHARGE: Performed by: STUDENT IN AN ORGANIZED HEALTH CARE EDUCATION/TRAINING PROGRAM

## 2024-09-12 PROCEDURE — 85025 COMPLETE CBC W/AUTO DIFF WBC: CPT | Performed by: STUDENT IN AN ORGANIZED HEALTH CARE EDUCATION/TRAINING PROGRAM

## 2024-09-12 PROCEDURE — 62328 DX LMBR SPI PNXR W/FLUOR/CT: CPT | Performed by: STUDENT IN AN ORGANIZED HEALTH CARE EDUCATION/TRAINING PROGRAM

## 2024-09-12 RX ORDER — DEXAMETHASONE 1 MG
TABLET ORAL
Qty: 180 TABLET | Refills: 0 | Status: CANCELLED | OUTPATIENT
Start: 2024-09-12 | End: 2024-11-01

## 2024-09-12 RX ORDER — DEXAMETHASONE 2 MG/1
2 TABLET ORAL 2 TIMES DAILY WITH MEALS
Qty: 63 TABLET | Refills: 0 | Status: SHIPPED | OUTPATIENT
Start: 2024-09-12 | End: 2024-10-14

## 2024-09-12 RX ORDER — LIDOCAINE HYDROCHLORIDE 10 MG/ML
INJECTION, SOLUTION EPIDURAL; INFILTRATION; INTRACAUDAL; PERINEURAL AS NEEDED
Status: COMPLETED | OUTPATIENT
Start: 2024-09-12 | End: 2024-09-12

## 2024-09-12 RX ORDER — FENTANYL CITRATE 50 UG/ML
INJECTION, SOLUTION INTRAMUSCULAR; INTRAVENOUS AS NEEDED
Status: COMPLETED | OUTPATIENT
Start: 2024-09-12 | End: 2024-09-12

## 2024-09-12 RX ORDER — MIDAZOLAM HYDROCHLORIDE 2 MG/2ML
INJECTION, SOLUTION INTRAMUSCULAR; INTRAVENOUS AS NEEDED
Status: COMPLETED | OUTPATIENT
Start: 2024-09-12 | End: 2024-09-12

## 2024-09-12 RX ORDER — LIDOCAINE WITH 8.4% SOD BICARB 0.9%(10ML)
SYRINGE (ML) INJECTION AS NEEDED
Status: COMPLETED | OUTPATIENT
Start: 2024-09-12 | End: 2024-09-12

## 2024-09-12 RX ADMIN — ACETAMINOPHEN 975 MG: 325 TABLET, FILM COATED ORAL at 09:48

## 2024-09-12 RX ADMIN — PANTOPRAZOLE SODIUM 40 MG: 40 TABLET, DELAYED RELEASE ORAL at 05:33

## 2024-09-12 RX ADMIN — Medication 5000 UNITS: at 08:50

## 2024-09-12 RX ADMIN — MIDAZOLAM 0.5 MG: 1 INJECTION INTRAMUSCULAR; INTRAVENOUS at 07:48

## 2024-09-12 RX ADMIN — DEXAMETHASONE SODIUM PHOSPHATE 4 MG: 4 INJECTION INTRA-ARTICULAR; INTRALESIONAL; INTRAMUSCULAR; INTRAVENOUS; SOFT TISSUE at 08:50

## 2024-09-12 RX ADMIN — LEVOTHYROXINE SODIUM 50 MCG: 50 TABLET ORAL at 05:33

## 2024-09-12 RX ADMIN — LEVETIRACETAM 750 MG: 500 TABLET, FILM COATED ORAL at 08:50

## 2024-09-12 RX ADMIN — ACETAMINOPHEN 975 MG: 325 TABLET, FILM COATED ORAL at 03:28

## 2024-09-12 RX ADMIN — FENTANYL CITRATE 25 MCG: 50 INJECTION INTRAMUSCULAR; INTRAVENOUS at 07:48

## 2024-09-12 RX ADMIN — LIDOCAINE 1 PATCH: 50 PATCH CUTANEOUS at 08:50

## 2024-09-12 RX ADMIN — ASPIRIN 81 MG 81 MG: 81 TABLET ORAL at 08:50

## 2024-09-12 RX ADMIN — Medication 5 ML: at 08:00

## 2024-09-12 RX ADMIN — CYANOCOBALAMIN TAB 500 MCG 1000 MCG: 500 TAB at 08:50

## 2024-09-12 RX ADMIN — FOLIC ACID 1 MG: 1 TABLET ORAL at 08:50

## 2024-09-12 RX ADMIN — Medication 250 MG: at 08:50

## 2024-09-12 RX ADMIN — LIDOCAINE HYDROCHLORIDE 2 ML: 10 INJECTION, SOLUTION EPIDURAL; INFILTRATION; INTRACAUDAL; PERINEURAL at 08:15

## 2024-09-12 NOTE — CASE MANAGEMENT
Case Management Discharge Planning Note    Patient name Ayla Nye  Location S /S -01 MRN [id number]  : 1950 Date 2024       Current Admission Date: 2024  Current Admission Diagnosis:Altered mental status   Patient Active Problem List    Diagnosis Date Noted Date Diagnosed    Goals of care, counseling/discussion 2024     Right hip pain 09/10/2024     Altered mental status 2024     Hypothyroidism 2024     Abnormal imaging of central nervous system 2024     Acute metabolic encephalopathy 2024     Malignant neoplasm metastatic to brain (HCC) 2024     Brain mass 2024     Metastatic cancer to brain (HCC) 2024     Visual disturbance 2024     Dehydration 2024     Moderate protein-calorie malnutrition (HCC) 2024     Bilateral leg pain 2024     Insomnia 2024     Malignant neoplasm of upper lobe, right bronchus or lung (HCC) 2024     Age-related osteoporosis without current pathological fracture 2023     Encounter for monitoring of hydroxyurea therapy 2023     JAK2 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 2023     Hypertension 08/10/2022     Mixed hyperlipidemia 08/10/2022     Essential thrombocytosis (HCC) 2020     TB lung, 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     Psoriatic arthritis (HCC) 09/10/2019       LOS (days): 2  Geometric Mean LOS (GMLOS) (days): 8.2  Days to GMLOS:6.4     OBJECTIVE:  Risk of Unplanned Readmission Score: 26.39         Current admission status: Inpatient   Preferred Pharmacy:   [id number] [place], [place] - [address] [address] [facility] [address]  Phone: [phone] Fax: [phone]    Primary Care Provider: Rafy Angelo MD    Primary Insurance: MEDICARE  Secondary Insurance: [facility]    DISCHARGE DETAILS:    Discharge planning discussed with:: Patient  Freedom of Choice: Yes  Comments - Freedom of Choice: Return home  CM contacted  family/caregiver?: No- see comments (Declined)  Were Treatment Team discharge recommendations reviewed with patient/caregiver?: Yes  Did patient/caregiver verbalize understanding of patient care needs?: Yes  Were patient/caregiver advised of the risks associated with not following Treatment Team discharge recommendations?: Yes    Contacts  Patient Contacts: Ayla  Contact Method: In Person  Reason/Outcome: Continuity of Care, Discharge Planning    Requested Home Health Care         Is the patient interested in HHC at discharge?: No    DME Referral Provided  Referral made for DME?: No    Other Referral/Resources/Interventions Provided:  Interventions: Other (Specify)  Referral Comments: CM received notification that pt is medically stable for dc today. CM spoke with pt at bedside to review IMM. Pt agreeable to dc today with no plans to appeal. CM placed original in scan bin on unit to be uploaded to pt chart, copy provided. Pt declined offer for [facility] -- reported she only wishes to follow with Palliative Care outpatient. Denied OP CM referral as well. Pt reported her family will provide transportation home. CM will remain available.    Would you like to participate in our [facility] service program?  : No - Declined    Treatment Team Recommendation: Home  Discharge Destination Plan:: Home  Transport at Discharge : Family    ETA of Transport (Date): 09/12/24     Accompanied by: Family member     IMM Given (Date):: 09/12/24  IMM Given to:: Patient  ..IMM reviewed with patient, patient agrees with discharge determination.

## 2024-09-12 NOTE — DISCHARGE INSTR - AVS FIRST PAGE
Dear Ayla Nye,     It was our pleasure to care for you here at [facility].  It is our hope that we were always able to exceed the expected standards for your care during your stay.  You were hospitalized due to confusion and disorientation in the setting of recent stereotactic radiotherapy and brain metastases.  You were cared for on the south fourth floor by Marsha Miller MD under the service of Jhoan Medellin MD with the [facility] who covers for your primary care physician (PCP), Rafy Angelo MD, while you were hospitalized.  If you have any questions or concerns related to this hospitalization, you may contact us at .  For follow up as well as any medication refills, we recommend that you follow up with your primary care physician.  A registered nurse will reach out to you by phone within a few days after your discharge to answer any additional questions that you may have after going home.  However, at this time we provide for you here, the most important instructions / recommendations at discharge:     Notable Medication Adjustments -   Please discontinue your lexoythyroxine. Follow up with oncology to monitor serial TSH labs.  Please continue your Dexamethasone taper as follows:  Remain on 4 mg (two 2 mg tablets) steroid twice daily through the rest of this week. Last dose of 4 mg twice daily on [place] 9/15/2024.    Beginning on Monday 9/16/2024, taper steroid down to 4 mg (two tablets) in the a.m. and 2 mg (one tablet) in the p.m. Stay on that dose all of next week through Sunday 9/22/2024.  Beginning on Monday 9/23/2024, taper steroid down to 2 mg (one tablet) in the a.m. and 2 mg (one tablet) in the p.m. for 1 week, through Sunday 9/29/2024.   Starting Monday 9/30/2024, take 2 mg (one tablet) dexamethasone daily until your repeat brain MRI in October. You will have enough pills to cover until October 12th.  Reach out to  oncology and radiation oncology following MRI for steroid tapering recommendations.  Please continue the rest of your home medications as prescribed.  Testing Required after Discharge -   Please follow up with outpatient for repeat brain MRI scheduled for 10/8/2024 at [facility].  Please follow up with the results of your right hip biopsy at your next oncology appointment on 9/17/2024.  Please follow up with the final results of your lumbar spine CSF cultures.   Follow up oncology outpatient to monitor serial TSH labs for your thyroid.  Important follow up information -   Please follow up with your primary care provider within one week.  Please follow up with your oncologist, Dr. Castro, outpatient for your scheduled appointment on 9/17/2024.  Please follow up with radiation oncology, Dr. Gamboa, outpatient.  Please follow up with palliative care outpatient. Palliative care is aware and will call to set up outpatient appointment in the office. ZULEYMA Pollard   Please follow up for your scheduled outpatient neurology appointment on 9/17/2024.  Other Instructions -   If you begin to experience severe confusion, dizziness, lightheadedness, severe headache, acute vision issues, please return to the hospital.  Please review this entire after visit summary as additional general instructions including medication list, appointments, activity, diet, any pertinent wound care, and other additional recommendations from your care team that may be provided for you.      Sincerely,     Marsha Miller MD

## 2024-09-12 NOTE — SEDATION DOCUMENTATION
Procedure ended. IR biopsy completed by Dr. Birch. IR lumbar puncture also completed. Opening pressure 15 cm H2O. 20 ml clear CSF removed. Specimens sent to lab. Bandaid to site.

## 2024-09-12 NOTE — ASSESSMENT & PLAN NOTE
Complains of pain in lower back 3/10.  She has been using tylenol which she feels is helpful  Discussed using lidocaine patch to lower back buttock in addition to hip.  Patient has tried oxycodone, she felt it was not helpful and had woken up with a headache the next day.  We discussed the use of other pain medications at this time she would like to continue with tylenol

## 2024-09-12 NOTE — ASSESSMENT & PLAN NOTE
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   Follows with Dr. Catsro outpatient.  Treated with chemoradiotherapy therapy. CRT session conducted for brain mets  Pt states she has immunotherapy outpatient scheudled for 9/16/2024  Palliative care consulted for goals of care

## 2024-09-12 NOTE — INCIDENTAL FINDINGS
The following findings require follow up:  Radiographic finding   Finding: MRI Brain BT w wo Contrast 9/10/2024: Mixed treatment response., - New tiny left temporal lobe metastatic lesion., - Unchanged left paramedian parietal lobe and left occipital lobe metastatic lesions - largest in left occipital lobe., - Unchanged nearly symmetric enhancement along the course of bilateral cranial nerve VII in both internal auditory canals, suspicious for leptomeningeal metastasis. Differential includes bilateral Bell's palsy., - Slightly decreased size of left anterolateral frontal lobe, left paramedian frontal lobe, and cerebellar vermis metastatic lesions., - Persistent diffuse vasogenic edema in left parietal, left occipital, and left posterior temporal lobes with mild mass effect on posterior aspect of left lateral ventricle, similar to recent CT heads but worsened since MRI brain 7/30/2024., The study was marked in EPIC for immediate notification., Workstation performed: [id number]    Follow up required: Follow up with oncology outpatient. Follow up for scheduled repeat MRI in October   Follow up should be done within 2 week(s)    Please notify the following clinician to assist with the follow up:   Dr. Castro    Incidental finding results were discussed with the Patient by Marsha Miller MD on 09/12/24.   They expressed understanding and all questions answered.

## 2024-09-12 NOTE — ASSESSMENT & PLAN NOTE
Labs 8/24/2024 with oncology outpatient consistent with subclinical hypothyroidism with TSH 16.3, Free T4 0.77, and Free T3 3.35.    Pt started on Levothyroxine and took a few doses.   Labs on this admission 9/9/2024 show TSH of 2.4. No FT4 or FT3 drawn.    Per oncology, because the half-life of levothyroxine is so long it is possible that her improvement in TSH as a result of the medication.  However, also possible that originally elevated TSH was elevated due to being an acute phase reactant.    Oncology recs stopping levothyroxine and following up with serial TSH labs outpatient    Plan:  Discontinue levothyroxine  Follow up with oncology for serial TSH labs outpatient

## 2024-09-12 NOTE — BRIEF OP NOTE (RAD/CATH)
INTERVENTIONAL RADIOLOGY PROCEDURE NOTE    Date: 9/12/2024    Procedure:   US guided right gluteal mass biopsy    LP      Preoperative diagnosis:   1. Confusion    2. Malignant neoplasm metastatic to brain (HCC)    3. Vasogenic cerebral edema (HCC)    4. Malignant neoplasm of upper lobe, right bronchus or lung (HCC)    5. Metastatic cancer to brain (HCC)         Postoperative diagnosis: Same.    Surgeon: Greg Birch MD     Assistant: None. No qualified resident was available.    Blood loss: 0 ml    Specimens: sent to path    Findings:   US guided right gluteal mass biopsy.    LP - OP 15 cm h20.    Complications: None immediate.    Anesthesia: conscious sedation

## 2024-09-12 NOTE — PLAN OF CARE
Problem: PAIN - ADULT  Goal: Verbalizes/displays adequate comfort level or baseline comfort level  Description: Interventions:  - Encourage patient to monitor pain and request assistance  - Assess pain using appropriate pain scale  - Administer analgesics based on type and severity of pain and evaluate response  - Implement non-pharmacological measures as appropriate and evaluate response  - Consider cultural and social influences on pain and pain management  - Notify physician/advanced practitioner if interventions unsuccessful or patient reports new pain  Outcome: Progressing     Problem: INFECTION - ADULT  Goal: Absence or prevention of progression during hospitalization  Description: INTERVENTIONS:  - Assess and monitor for signs and symptoms of infection  - Monitor lab/diagnostic results  - Monitor all insertion sites, i.e. indwelling lines, tubes, and drains  - Monitor endotracheal if appropriate and nasal secretions for changes in amount and color  - [place] appropriate cooling/warming therapies per order  - Administer medications as ordered  - Instruct and encourage patient and family to use good hand hygiene technique  - Identify and instruct in appropriate isolation precautions for identified infection/condition  Outcome: Progressing     Problem: DISCHARGE PLANNING  Goal: Discharge to home or other facility with appropriate resources  Description: INTERVENTIONS:  - Identify barriers to discharge w/patient and caregiver  - Arrange for needed discharge resources and transportation as appropriate  - Identify discharge learning needs (meds, wound care, etc.)  - Arrange for interpretive services to assist at discharge as needed  - Refer to Case Management Department for coordinating discharge planning if the patient needs post-hospital services based on physician/advanced practitioner order or complex needs related to functional status, cognitive ability, or social support system  Outcome: Progressing      Problem: Knowledge Deficit  Goal: Patient/family/caregiver demonstrates understanding of disease process, treatment plan, medications, and discharge instructions  Description: Complete learning assessment and assess knowledge base.  Interventions:  - Provide teaching at level of understanding  - Provide teaching via preferred learning methods  Outcome: Progressing     Problem: NEUROSENSORY - ADULT  Goal: Achieves stable or improved neurological status  Description: INTERVENTIONS  - Monitor and report changes in neurological status  - Monitor vital signs such as temperature, blood pressure, glucose, and any other labs ordered   - Initiate measures to prevent increased intracranial pressure  - Monitor for seizure activity and implement precautions if appropriate      Outcome: Progressing

## 2024-09-12 NOTE — ASSESSMENT & PLAN NOTE
Pt has history of right hip pain, which she continues to endorse during this hospitalization.  MRI pelvis bony wo and w contrast 8/30/2024: Enhancing soft tissue lesion in the right ischioanal fat measuring 3.8 x 2.6 x 3.4 cm, increased in size from 8/5/2024 and 7/3/2024. Findings are suspicious for a malignant lesion, likely a metastasis from patient's lung cancer.  Per heme onc, this may be a site of metastatic disease. Pt had biopsy ordered last month that is scheduled for end of this month.  Pt has previously tried oxycodone for pain but does not like the way it makes her feel.  Tylenol 975 mg inpatient provided relief for around 5 hours, around which time the pain returns.    Plan:  F/up right hip biopsy results outpatient  Palliative therapy outpatient

## 2024-09-12 NOTE — DISCHARGE INSTRUCTIONS
Lumbar Puncture     WHAT YOU NEED TO KNOW:   Lumbar puncture (LP) is a procedure in which a needle is inserted in your back and into your spinal canal. This is usually done to collect cerebrospinal fluid (CSF) to check for an infection, inflammation, bleeding, or other conditions that affect the brain. CSF is a clear, protective fluid that flows around the brain and inside the spinal canal. LP may also be done to remove CSF to reduce pressure in the brain.     DISCHARGE INSTRUCTIONS:     Follow up with your healthcare provider as directed: Write down your questions so you remember to ask them during your visits.     Post-lumbar puncture headache: You may develop a headache during the first few hours after your LP that may last for several days. The headache may be mild to severe and may get worse when you sit or stand. The following may help ease a post-lumbar puncture headache:  Drink plenty of liquids: You should drink more liquid than usual after your LP. Ask how much liquid is right for you. Caffeine may be used to treat a headache. Drinks, such as coffee, tea, or some sodas, have caffeine. Ask a Do not drink alcohol.    Lie down: If you have a headache after your lumbar puncture, it may be helpful to lie down and rest.  You may have a slight soreness over the LP area. This is normal.  Remove the band aid or dressing in 24 hours.    Contact Interventional Radiology imediately  at [phone] ([facility] PATIENTS: Contact Interventional Radiology at [phone]) ([facility] PATIENTS: Contact Interventional Radiology at [phone]) if any of the following occur:  You have a severe headache that does not get better after you lie down.  Persistent nausea or vomiting   You have a fever.   You have a stiff neck or have trouble thinking clearly.   Your legs, feet, or other parts below the waist feel numb, tingly, or weak.   You have bleeding or a discharge coming from the area where the needle was put into your back.   You  have severe pain in your back or neck.             POST BIOPSY    Care after your procedure:    1. Limit your activities for 24 hours after your biopsy.    2. No driving day of biopsy.    3. Return to your normal diet.Small sips of flat soda will help with mild nausea.    4. Remove band-aid or dressing 24 hours after procedure.     Contact Interventional Radiology at [phone] ([facility] PATIENTS: Contact Interventional Radiology at [phone]) ([facility] PATIENTS: Contact Interventional Radiology at [phone]) if:    1. Difficulty breathing, nausea or vomiting.    2. Chills or fever above 101 degrees F.     3. Pain at biopsy site not relieved by medication.     4. Develop any redness, swelling, heat, unusual drainage, heavy bruising or bleeding from biopsy site.

## 2024-09-12 NOTE — ASSESSMENT & PLAN NOTE
Pt last admitted 8/21 for confusion and disorientation following CRT for adenocarcinoma of lungs with brain mets. Pt discharged on steroid taper and was on last day of taper (1 mg daily) when she presented for this ED admission. Pt had initially improved on her taper course following her last hospitalization but began to have spatial disorientation, confusion of where she left objects, apraxia when she transitioned from 2 mg to 1 mg dose.  CT H 9/9 : CT Brain: Vasogenic edema left parietal and left occipital lobes similar to prior study in attributed to history of brain metastasis. Mass effect on the posterior aspect of the left lateral ventricle unchanged.  Labs noncontributory to her confusion. US unremarkable and electrolytes wnl.    AMS with spatial disorientation, apraxia, visual field deficits with decreased right peripheral vision is likely 2/2 to vasogenic edema of left parietal and left occipital lobes    Patient AAOx4 today and endorses improvement in confusion since starting 4 mg IV BID Dexamethasone dose inpatient    MRI Brain 9/10 shows new tiny left temporal lobe metastatic lesion. Unchanged left paramedian parietal lobe and left occipital lobe metastatic lesions - largest in left occipital lobe. Unchanged nearly symmetric enhancement along the course of bilateral cranial nerve VII in both internal auditory canals, suspicious for leptomeningeal metastasis. Differential includes bilateral Bell's palsy. 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 Rad onc recs lumbar puncture with CSF analysis and cytology show no evidence thus far of leptomeningeal disease. 0 WBCs, 0 RBCs, normal glucose, slightly elevated  protein at 73, negative gram stain. Cultures pending.    Plan:  Continue dexamethasone 4 mg Q12 hours for the rest of this week. Then taper to 4 mg in a.m. and 2 mg in p.m. for all of next week. The following week, taper to 2 mg in the a.m. and 2 mg in p.m. for that week. Finally, taper to 2 mg daily until repeat MRI in October.  Follow up with oncology and radiation oncology outpatient.  Follow up with neurology outpatient.

## 2024-09-12 NOTE — ASSESSMENT & PLAN NOTE
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   Follows with Dr. Castro - medical oncology  Treated with chemoradiotherapy therapy. CRT session conducted for brain mets  Has immunotherapy outpatient scheduled for 9/16/2024

## 2024-09-12 NOTE — QUICK NOTE
73 yoF with cT2b N3 Stage IIIB NSCLC/adenocarcinoma and JAK2+ ET who presented to the ED 9/10/24 with AMS found to have MRI Brain showing increased cerebral vasogenic edema (recently tapered steroids downward but not stopped), c/f leptomeningeal mets, evidence of new tiny temporal lobe met, evidence of unchanged parietal and occipital mets, and evidence of decreased frontal lobe and cerebellar mets.  She was also noted to have a new mass of her deep R gluteal fossa.    Her mental status rapidly improved with steroids.  Patient underwent LP and biopsy of her R gluteal mass.  Initial LP studies showed 0 WBCs, 0 RBCs, normal glucose, slightly elevated protein at 73, negative Gram stain and cultures are pending.  Reassuring for ruling out meningitis and ICH.  No evidence thus far of leptomeningeal metastasis on LP.  Pathology of the biopsy of her right gluteal mass is pending and will be addressed at her next visit on 9/17/2024.  Patient received dexamethasone 4 BID x3 days and will be given a dexamethasone taper at discharge with plans not to drop below prior threshold as this resulted in cerebral edema.  Rad onc is following.  Overall an ECOG of roughly 1, and appears to be appropriate with her next infusion on 9/16/2024.  Will reach out to her medical oncologist, Dr. Castro, as her next appointment with her is scheduled for 9/17/2024.    Additionally, the patient asked for recommendations regarding what to do with her levothyroxine.  She was seen in our office shortly after her labs drawn on 8/24/2024 were consistent with subclinical hypothyroidism with TSH 16.3, FT4 0.77, and FT3 3.35.  She was started on levothyroxine for which she only took a small number of doses.  Subsequently labs drawn with this admission on 9/9/2024 showed a TSH of 2.4, and no FT4 or FT3 drawn.  Because the half-life of levothyroxine is so long it is possible that her improvement in TSH as a result of the medication.  But it is also  possible that her originally elevated TSH was elevated due to being an acute phase reactant.  We do need to continue to monitor these closely given that she will be on immunotherapy.  However, at this time I recommended that she discontinue the levothyroxine, and we will monitor serial labs to determine whether she requires treatment.    Xu Alanis DO, PGY4  Hematology/Oncology Fellow

## 2024-09-12 NOTE — PROGRESS NOTES
"Interventional Radiology Preprocedure Note    History/Indication for procedure:   Ayla Nye is a 73 y.o. female with a PMH of lung cancer who presents for US guided right ishioanal mass biopsy as well as lumbar puncture.    Relevant past medical history:    Past Medical History:   Diagnosis Date    Allergic 2022    Allergic to neomiacin and cephalexin    Cataract Nov. 2023    Due to have durgery June 2024    Essential thrombocytosis (HCC)     controlled with medication    Hyperlipidemia     Hypertension     Mass in chest     Nodular goiter     Pneumonia Oct 16, 2023    Also  Feb 2024    Psoriasis     SIRS (systemic inflammatory response syndrome) (HCC) 06/22/2024     Patient Active Problem List   Diagnosis    TB lung, latent    Psoriatic arthritis (HCC)    Essential thrombocytosis (HCC)    Hypertension    Mixed hyperlipidemia    Encounter for monitoring of hydroxyurea therapy    JAK2 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    Age-related osteoporosis without current pathological fracture    Malignant neoplasm of upper lobe, right bronchus or lung (HCC)    Bilateral leg pain    Insomnia    Moderate protein-calorie malnutrition (HCC)    Dehydration    Visual disturbance    Metastatic cancer to brain (HCC)    Brain mass    Malignant neoplasm metastatic to brain (HCC)    Acute metabolic encephalopathy    Altered mental status    Hypothyroidism    Abnormal imaging of central nervous system    Right hip pain    Goals of care, counseling/discussion       /78   Pulse 55   Temp 98.6 °F (37 °C)   Resp 18   Ht 5' 2\" (1.575 m)   Wt 50.5 kg (111 lb 5.3 oz)   SpO2 97%   BMI 20.36 kg/m²     Medications:    Inpatient Medications:     Scheduled Medications:  Current Facility-Administered Medications   Medication Dose Route Frequency Provider Last Rate    acetaminophen  975 mg Oral Q6H PRN Cyrus Coker MD      aspirin  81 mg Oral Daily Cyrus Coker MD      Cholecalciferol  5,000 Units Oral Daily Cyrus Coker MD      vitamin B-12  1,000 mcg " Oral Daily Cyrus Coker MD      dexamethasone  4 mg Intravenous Q12H KRISS Cyrus Coker MD      diphenhydrAMINE  25 mg Oral Q6H PRN Cyrus Coker MD      folic acid  1 mg Oral Daily Cyrus Coker MD      levETIRAcetam  750 mg Oral BID Cyrus Coker MD      levothyroxine  50 mcg Oral Early Morning Cyrus Coker MD      lidocaine  1 patch Topical Daily Lisy ZULEYMA Gage      melatonin  3 mg Oral HS Xu Alanis DO      oxyCODONE  5 mg Oral Q4H PRN Cyrus Coker MD      pantoprazole  40 mg Oral Daily Before Breakfast Harsh Hanna DO      saccharomyces boulardii  250 mg Oral BID Cyrus Coker MD         Infusions:       PRN:    acetaminophen    diphenhydrAMINE    oxyCODONE    Outpatient Medications:  No current facility-administered medications on file prior to encounter.     Current Outpatient Medications on File Prior to Encounter   Medication Sig Dispense Refill    aspirin 81 mg chewable tablet Chew 81 mg daily.      Cholecalciferol (VITAMIN D3) 5000 units TABS Take 5,000 Units by mouth Daily      diphenhydrAMINE (BENADRYL) 25 mg capsule Take 25 mg by mouth every 12 (twelve) hours as needed for itching      folic acid (FOLVITE) 1 mg tablet Take 1 tablet (1 mg total) by mouth daily 30 tablet 6    levETIRAcetam (Keppra) 750 mg tablet Take 1 tablet (750 mg total) by mouth 2 (two) times a day 60 tablet 0    levothyroxine (Synthroid) 50 mcg tablet Take 1 tablet (50 mcg total) by mouth daily 30 tablet 1    oxyCODONE (Roxicodone) 5 immediate release tablet Take 1 tablet (5 mg total) by mouth every 4 (four) hours as needed for moderate pain Max Daily Amount: 30 mg 40 tablet 0    pantoprazole (PROTONIX) 40 mg tablet TAKE 1 TABLET(40 MG) BY MOUTH DAILY EARLY MORNING 30 tablet 5    Probiotic Product (PROBIOTIC DAILY PO) Take 1 capsule by mouth daily      vitamin B-12 (VITAMIN B-12) 1,000 mcg tablet Take 1 tablet (1,000 mcg total) by mouth daily 90 tablet 1       Allergies   Allergen Reactions    Doxycycline Headache    Keflex  [Cephalexin] Rash    Neomycin-Polymyxin-Dexameth Eye Swelling       Anticoagulants: ASA    ASA classification: ASA 3 - Patient with moderate systemic disease with functional limitations    Airway Assessment: II (hard and soft palate, upper portion of tonsils anduvula visible)    Relevant family history: None    Relevant review of systems: None    Prior sedation/anesthesia: yes    Can the patient lie flat? Yes     NPO Status: yes    Labs:   CBC with diff:   Lab Results   Component Value Date    WBC 6.30 09/11/2024    HGB 9.8 (L) 09/11/2024    HCT 30.2 (L) 09/11/2024     (H) 09/11/2024     09/11/2024    RBC 3.02 (L) 09/11/2024    MCH 32.5 09/11/2024    MCHC 32.5 09/11/2024    RDW 14.2 09/11/2024    MPV 8.5 (L) 09/11/2024    NRBC 0 09/10/2024     BMP/CMP:  Lab Results   Component Value Date    K 4.2 09/11/2024     09/11/2024    CO2 26 09/11/2024    BUN 19 09/11/2024    CREATININE 0.88 09/11/2024    CALCIUM 8.8 09/11/2024    AST 17 09/10/2024    ALT 23 09/10/2024    ALKPHOS 40 09/10/2024    EGFR 65 09/11/2024   ,     Coags:   Lab Results   Component Value Date    PTT 25 09/09/2024    INR 0.85 09/09/2024   ,   Results from last 7 days   Lab Units 09/09/24  1148   PTT seconds 25   INR  0.85        Relevant imaging studies:   Reviewed.    Directed physical examination:  I agree with the physical exam performed on 9/11/24 and there are no additional changes.    Assessment/Plan:   Biopsy, LP.    Sedation/Anesthesia plan:  Moderate sedation will be used as needed for procedure.    Consent with alternatives to the procedure, risks and benefits have been explained and discussed with the patient/patient's family: yes, increased risk of bleeding given recent aspirin use discussed.

## 2024-09-12 NOTE — PLAN OF CARE
Problem: PAIN - ADULT  Goal: Verbalizes/displays adequate comfort level or baseline comfort level  Description: Interventions:  - Encourage patient to monitor pain and request assistance  - Assess pain using appropriate pain scale  - Administer analgesics based on type and severity of pain and evaluate response  - Implement non-pharmacological measures as appropriate and evaluate response  - Consider cultural and social influences on pain and pain management  - Notify physician/advanced practitioner if interventions unsuccessful or patient reports new pain  Outcome: Progressing     Problem: INFECTION - ADULT  Goal: Absence or prevention of progression during hospitalization  Description: INTERVENTIONS:  - Assess and monitor for signs and symptoms of infection  - Monitor lab/diagnostic results  - Monitor all insertion sites, i.e. indwelling lines, tubes, and drains  - Monitor endotracheal if appropriate and nasal secretions for changes in amount and color  - [place] appropriate cooling/warming therapies per order  - Administer medications as ordered  - Instruct and encourage patient and family to use good hand hygiene technique  - Identify and instruct in appropriate isolation precautions for identified infection/condition  Outcome: Progressing     Problem: DISCHARGE PLANNING  Goal: Discharge to home or other facility with appropriate resources  Description: INTERVENTIONS:  - Identify barriers to discharge w/patient and caregiver  - Arrange for needed discharge resources and transportation as appropriate  - Identify discharge learning needs (meds, wound care, etc.)  - Arrange for interpretive services to assist at discharge as needed  - Refer to Case Management Department for coordinating discharge planning if the patient needs post-hospital services based on physician/advanced practitioner order or complex needs related to functional status, cognitive ability, or social support system  Outcome: Progressing      Problem: Knowledge Deficit  Goal: Patient/family/caregiver demonstrates understanding of disease process, treatment plan, medications, and discharge instructions  Description: Complete learning assessment and assess knowledge base.  Interventions:  - Provide teaching at level of understanding  - Provide teaching via preferred learning methods  Outcome: Progressing     Problem: NEUROSENSORY - ADULT  Goal: Achieves stable or improved neurological status  Description: INTERVENTIONS  - Monitor and report changes in neurological status  - Monitor vital signs such as temperature, blood pressure, glucose, and any other labs ordered   - Initiate measures to prevent increased intracranial pressure  - Monitor for seizure activity and implement precautions if appropriate      Outcome: Progressing

## 2024-09-13 ENCOUNTER — APPOINTMENT (OUTPATIENT)
Dept: LAB | Facility: CLINIC | Age: 74
End: 2024-09-13
Payer: MEDICARE

## 2024-09-13 ENCOUNTER — TELEPHONE (OUTPATIENT)
Dept: PALLIATIVE MEDICINE | Facility: CLINIC | Age: 74
End: 2024-09-13

## 2024-09-13 ENCOUNTER — TELEPHONE (OUTPATIENT)
Dept: FAMILY MEDICINE CLINIC | Facility: CLINIC | Age: 74
End: 2024-09-13

## 2024-09-13 DIAGNOSIS — Z79.899 ENCOUNTER FOR LONG-TERM (CURRENT) USE OF OTHER MEDICATIONS: ICD-10-CM

## 2024-09-13 DIAGNOSIS — C34.11 MALIGNANT NEOPLASM OF UPPER LOBE, RIGHT BRONCHUS OR LUNG (HCC): ICD-10-CM

## 2024-09-13 LAB
ALBUMIN SERPL BCG-MCNC: 4 G/DL (ref 3.5–5)
ALP SERPL-CCNC: 45 U/L (ref 34–104)
ALT SERPL W P-5'-P-CCNC: 16 U/L (ref 7–52)
ANION GAP SERPL CALCULATED.3IONS-SCNC: 7 MMOL/L (ref 4–13)
AST SERPL W P-5'-P-CCNC: 10 U/L (ref 13–39)
BASOPHILS # BLD MANUAL: 0 THOUSAND/UL (ref 0–0.1)
BASOPHILS NFR MAR MANUAL: 0 % (ref 0–1)
BILIRUB SERPL-MCNC: 0.24 MG/DL (ref 0.2–1)
BUN SERPL-MCNC: 30 MG/DL (ref 5–25)
CALCIUM SERPL-MCNC: 9 MG/DL (ref 8.4–10.2)
CHLORIDE SERPL-SCNC: 106 MMOL/L (ref 96–108)
CO2 SERPL-SCNC: 26 MMOL/L (ref 21–32)
CREAT SERPL-MCNC: 1.12 MG/DL (ref 0.6–1.3)
CRP SERPL QL: 4.3 MG/L
EOSINOPHIL # BLD MANUAL: 0 THOUSAND/UL (ref 0–0.4)
EOSINOPHIL NFR BLD MANUAL: 0 % (ref 0–6)
ERYTHROCYTE [DISTWIDTH] IN BLOOD BY AUTOMATED COUNT: 14.5 % (ref 11.6–15.1)
ERYTHROCYTE [SEDIMENTATION RATE] IN BLOOD: 25 MM/HOUR (ref 0–29)
FOLATE SERPL-MCNC: >22.3 NG/ML
GFR SERPL CREATININE-BSD FRML MDRD: 48 ML/MIN/1.73SQ M
GLUCOSE SERPL-MCNC: 130 MG/DL (ref 65–140)
HCT VFR BLD AUTO: 31.9 % (ref 34.8–46.1)
HGB BLD-MCNC: 10.5 G/DL (ref 11.5–15.4)
LDH SERPL-CCNC: 256 U/L (ref 140–271)
LIPASE SERPL-CCNC: 28 U/L (ref 11–82)
LYMPHOCYTES # BLD AUTO: 0 % (ref 14–44)
LYMPHOCYTES # BLD AUTO: 0 THOUSAND/UL (ref 0.6–4.47)
MCH RBC QN AUTO: 33.2 PG (ref 26.8–34.3)
MCHC RBC AUTO-ENTMCNC: 32.9 G/DL (ref 31.4–37.4)
MCV RBC AUTO: 101 FL (ref 82–98)
MONOCYTES # BLD AUTO: 0.6 THOUSAND/UL (ref 0–1.22)
MONOCYTES NFR BLD: 7 % (ref 4–12)
MYELOCYTE ABSOLUTE CT: 0.09 THOUSAND/UL (ref 0–0.1)
MYELOCYTES NFR BLD MANUAL: 1 % (ref 0–1)
NEUTROPHILS # BLD MANUAL: 7.84 THOUSAND/UL (ref 1.85–7.62)
NEUTS SEG NFR BLD AUTO: 92 % (ref 43–75)
PLATELET # BLD AUTO: 264 THOUSANDS/UL (ref 149–390)
PLATELET BLD QL SMEAR: ADEQUATE
PMV BLD AUTO: 8.6 FL (ref 8.9–12.7)
POTASSIUM SERPL-SCNC: 3.9 MMOL/L (ref 3.5–5.3)
PROT SERPL-MCNC: 6.6 G/DL (ref 6.4–8.4)
RBC # BLD AUTO: 3.16 MILLION/UL (ref 3.81–5.12)
RBC MORPH BLD: NORMAL
SODIUM SERPL-SCNC: 139 MMOL/L (ref 135–147)
T3FREE SERPL-MCNC: 2.33 PG/ML (ref 2.5–3.9)
T4 FREE SERPL-MCNC: 0.92 NG/DL (ref 0.61–1.12)
TSH SERPL DL<=0.05 MIU/L-ACNC: 0.4 UIU/ML (ref 0.45–4.5)
VIT B12 SERPL-MCNC: 1268 PG/ML (ref 180–914)
WBC # BLD AUTO: 8.52 THOUSAND/UL (ref 4.31–10.16)

## 2024-09-13 PROCEDURE — 82607 VITAMIN B-12: CPT

## 2024-09-13 PROCEDURE — 82746 ASSAY OF FOLIC ACID SERUM: CPT

## 2024-09-13 PROCEDURE — 84481 FREE ASSAY (FT-3): CPT

## 2024-09-13 PROCEDURE — 83615 LACTATE (LD) (LDH) ENZYME: CPT

## 2024-09-13 PROCEDURE — 36415 COLL VENOUS BLD VENIPUNCTURE: CPT

## 2024-09-13 PROCEDURE — 82024 ASSAY OF ACTH: CPT

## 2024-09-13 PROCEDURE — 84443 ASSAY THYROID STIM HORMONE: CPT

## 2024-09-13 PROCEDURE — 80053 COMPREHEN METABOLIC PANEL: CPT

## 2024-09-13 PROCEDURE — 86140 C-REACTIVE PROTEIN: CPT

## 2024-09-13 PROCEDURE — 85027 COMPLETE CBC AUTOMATED: CPT

## 2024-09-13 PROCEDURE — 83690 ASSAY OF LIPASE: CPT

## 2024-09-13 PROCEDURE — 85007 BL SMEAR W/DIFF WBC COUNT: CPT

## 2024-09-13 PROCEDURE — 84439 ASSAY OF FREE THYROXINE: CPT

## 2024-09-13 PROCEDURE — 85652 RBC SED RATE AUTOMATED: CPT

## 2024-09-13 NOTE — TELEPHONE ENCOUNTER
Called patient the second time to schedule a TCM appointment, left detailed message offering two different appointment times with dates available advised to call office to speak to Amanda to schedule

## 2024-09-14 LAB — ACTH PLAS-MCNC: <1.5 PG/ML (ref 7.2–63.3)

## 2024-09-15 LAB — BACTERIA CSF CULT: NO GROWTH

## 2024-09-16 ENCOUNTER — TELEPHONE (OUTPATIENT)
Dept: HEMATOLOGY ONCOLOGY | Facility: CLINIC | Age: 74
End: 2024-09-16

## 2024-09-16 ENCOUNTER — TELEPHONE (OUTPATIENT)
Dept: PALLIATIVE CARE | Facility: HOSPITAL | Age: 74
End: 2024-09-16

## 2024-09-16 ENCOUNTER — PATIENT OUTREACH (OUTPATIENT)
Dept: HEMATOLOGY ONCOLOGY | Facility: CLINIC | Age: 74
End: 2024-09-16

## 2024-09-16 ENCOUNTER — HOSPITAL ENCOUNTER (OUTPATIENT)
Dept: INFUSION CENTER | Facility: HOSPITAL | Age: 74
Discharge: HOME | End: 2024-09-16
Attending: INTERNAL MEDICINE

## 2024-09-16 DIAGNOSIS — C34.11 MALIGNANT NEOPLASM OF UPPER LOBE, RIGHT BRONCHUS OR LUNG (HCC): Primary | ICD-10-CM

## 2024-09-16 DIAGNOSIS — G89.3 CANCER RELATED PAIN: ICD-10-CM

## 2024-09-16 DIAGNOSIS — Z51.5 PALLIATIVE CARE BY SPECIALIST: ICD-10-CM

## 2024-09-16 DIAGNOSIS — F11.90 OPIOID USE: ICD-10-CM

## 2024-09-16 DIAGNOSIS — C79.31 MALIGNANT NEOPLASM METASTATIC TO BRAIN (HCC): ICD-10-CM

## 2024-09-16 PROCEDURE — 88360 TUMOR IMMUNOHISTOCHEM/MANUAL: CPT | Performed by: PATHOLOGY

## 2024-09-16 PROCEDURE — 88305 TISSUE EXAM BY PATHOLOGIST: CPT | Performed by: PATHOLOGY

## 2024-09-16 PROCEDURE — 88313 SPECIAL STAINS GROUP 2: CPT | Performed by: PATHOLOGY

## 2024-09-16 PROCEDURE — 88342 IMHCHEM/IMCYTCHM 1ST ANTB: CPT | Performed by: PATHOLOGY

## 2024-09-16 PROCEDURE — 88341 IMHCHEM/IMCYTCHM EA ADD ANTB: CPT | Performed by: PATHOLOGY

## 2024-09-16 RX ORDER — HYDROMORPHONE HYDROCHLORIDE 2 MG/1
2 TABLET ORAL EVERY 4 HOURS PRN
Qty: 30 TABLET | Refills: 0 | Status: SHIPPED | OUTPATIENT
Start: 2024-09-16

## 2024-09-16 NOTE — TELEPHONE ENCOUNTER
Chemotherapy scheduled for today was rescheduled for 9/19/24  Patient will see Dr. Morales in the office tomorrow as scheduled  Patient aware and verbalized understanding

## 2024-09-16 NOTE — TELEPHONE ENCOUNTER
Spoke with patient who has been taking Tylenol which is helpful but wears off quickly.   She has tried oxycodone in the past which she did not like. We discussed trying dilaudid- she is agreeable to try this. She will continue with Tylenol 500mg Q6hrs   And use the dilaudid 2mg Q4hrs PRN pain. Education provided to patient on medication.   Rx sent to pharmacy for patient- Patient scheduled for home visit on 9/18, will follow up on effectiveness of medication.

## 2024-09-16 NOTE — PROGRESS NOTES
ASSESSMENT      Are you eating and drinking properly?  -yes     Are you having any pain?  -yes- palliative care on board. Ayla stated she just got off the phone with them. She expressed they are going to try to help manage her pain and will loop in Dr. Castro as well.     Have needs changed for a palliative care referral?  -pt already established     Do you know when your upcoming appointments are?  -yes     Do you have a good support system?  -yes     Are you interested in any support groups?  - no    Are there any changes in barriers to care?  -no    Do you have any questions or concerns regarding your treatment plan?  -Not at this time. Ayla was very appreciative of my call

## 2024-09-17 ENCOUNTER — RA CDI HCC (OUTPATIENT)
Dept: OTHER | Facility: HOSPITAL | Age: 74
End: 2024-09-17

## 2024-09-17 ENCOUNTER — OFFICE VISIT (OUTPATIENT)
Dept: HEMATOLOGY ONCOLOGY | Facility: CLINIC | Age: 74
End: 2024-09-17
Payer: MEDICARE

## 2024-09-17 ENCOUNTER — TELEPHONE (OUTPATIENT)
Age: 74
End: 2024-09-17

## 2024-09-17 ENCOUNTER — OFFICE VISIT (OUTPATIENT)
Dept: NEUROLOGY | Facility: CLINIC | Age: 74
End: 2024-09-17
Payer: MEDICARE

## 2024-09-17 ENCOUNTER — TELEPHONE (OUTPATIENT)
Dept: PALLIATIVE MEDICINE | Facility: CLINIC | Age: 74
End: 2024-09-17

## 2024-09-17 VITALS
WEIGHT: 114 LBS | BODY MASS INDEX: 20.98 KG/M2 | OXYGEN SATURATION: 98 % | SYSTOLIC BLOOD PRESSURE: 136 MMHG | TEMPERATURE: 97.5 F | RESPIRATION RATE: 16 BRPM | HEIGHT: 62 IN | HEART RATE: 77 BPM | DIASTOLIC BLOOD PRESSURE: 84 MMHG

## 2024-09-17 VITALS
BODY MASS INDEX: 20.8 KG/M2 | SYSTOLIC BLOOD PRESSURE: 138 MMHG | DIASTOLIC BLOOD PRESSURE: 63 MMHG | WEIGHT: 113 LBS | HEIGHT: 62 IN | HEART RATE: 70 BPM

## 2024-09-17 DIAGNOSIS — C34.91 NSCLC OF RIGHT LUNG (HCC): Primary | ICD-10-CM

## 2024-09-17 DIAGNOSIS — K62.89 ANAL PAIN: ICD-10-CM

## 2024-09-17 DIAGNOSIS — R56.9 SEIZURE (HCC): Primary | ICD-10-CM

## 2024-09-17 PROCEDURE — 77263 THER RADIOLOGY TX PLNG CPLX: CPT | Performed by: RADIOLOGY

## 2024-09-17 PROCEDURE — 88108 CYTOPATH CONCENTRATE TECH: CPT | Performed by: PATHOLOGY

## 2024-09-17 PROCEDURE — 99215 OFFICE O/P EST HI 40 MIN: CPT | Performed by: NURSE PRACTITIONER

## 2024-09-17 PROCEDURE — 99214 OFFICE O/P EST MOD 30 MIN: CPT | Performed by: INTERNAL MEDICINE

## 2024-09-17 RX ORDER — ACETAMINOPHEN 325 MG/1
325 TABLET ORAL EVERY 6 HOURS PRN
COMMUNITY

## 2024-09-17 RX ORDER — CELECOXIB 100 MG/1
200 CAPSULE ORAL 2 TIMES DAILY
Qty: 30 CAPSULE | Refills: 1 | Status: SHIPPED | OUTPATIENT
Start: 2024-09-17

## 2024-09-17 NOTE — TELEPHONE ENCOUNTER
Returned call to pharmacy.  Clarified Rx for Celebrex.  Insurance would only cover 200mg caps - not 100mg caps  Gave verbal ok to pharmacy that they can fill Celebrex 200mg caps - take 1 PO BID    Patient is aware

## 2024-09-17 NOTE — PROGRESS NOTES
Neurology Ambulatory Visit  Name: Ayla Nye       : 1950       MRN: [id number]   Encounter Provider: ZULEYMA Monreal   Encounter Date: 2024  Encounter department: [facility]    Assessment & Plan  Seizure (HCC)  73 y.o. female, with Non Small Cell Lung Cancer with mets to brain who is presenting to [facility] for evaluation of her seizures. Episodes of peripheral vision loss, waves and flashing in her right viual field and disorientation are consistent with focal aware seizures. She has remained seizure free with levetiracetam monotherapy and will therefore continue the levetiracetam at the current dose. A baseline levetiracetam level will be obtained with her next set of labs. She should notify the office if she were to experience any further seizures, otherwise, she will follow-up in 3 months.     PLAN:   Continue Levetiracetam 750mg bid   Levetiracetam level with next set of labs  3.   Call office if you experience any further seizures  4.   Continue follow-up with Oncology       Other concerns: Pain control due to metastasis          Consider non narcotic Cymbalta especially for neuropathic pain    Follow-up: 3 months       History of Present Illness   Ayla Nye is a 73 y.o. female, who is presenting to [facility] for evaluation of her seizures. She is accompanied by her ex .    Patient was diagnosed with Non small cell lung cancer in 2024, after being diagnosed with pneumonia. She was hospitalized at the end of 2024 for stroke like symptoms (ataxia, right visual changes). However, stroke was ruled out and she was diagnosed with mets to her brain (left occipital lesion).    Events concerning for seizures began at the end of July.  She reports losing peripheral vision in her right eye (right visual field) and seeing waves and flashing in her right eye, again in her right peripheral field. She would also  feel herself leaning towards the right side and was somewhat disoriented. For example, she couldn't remember where she set something down and couldn't remember were things were in her kitchen.This would last about 5 minutes, up to 10 minutes. She denies any staring spells, loss of time events or waking up having bitten her tongue or having been incontinent of urine. However, there are other times where she will become confused for a short time. These events have not occurred since staring levetiracetam. She is currently maintained on levetiracetam 750mg bid without any side effects.     Patient follows with Oncology, currently receiving immunotherapy. She remains on Decadron which is currently being weaned. 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       Current Antiepileptic Medications:  1. Levetiracetam 750mg bid  Other medications as per Epic      Event/Seizure Semiology:  1.  Right peripheral vision loss, seeing waves and flashing  in her right visual field, right eye, lean towards right, disoriented  2.  Periods of confusion    Special Features:  Status epilepticus: none  Self Injury Seizures: none  Precipitating Factors: brain edema      Epilepsy Risk Factors:   Abnormal pregnancy: no    Abnormal birth/: no    Abnormal Development: no    Febrile seizures, simple: no    Febrile seizures, complex: no    CNS infection: no    Intellectual disability: no     Cerebral palsy:  no    Head injury (moderate/severe): no  CNS neoplasm: yes     CNS malformation: no     Neurosurgical procedure: no    Stroke:  no     Alcohol abuse: no    Drug abuse: no     Family history Sz/epilepsy: no   Prior physical/sexual/emotional abuse: no     Prior AEDs:  none    Prior Evaluation:  - MRI brain: 9/10/24 Mixed treatment response. New tiny left temporal lobe metastatic lesion. Unchanged left paramedian parietal lobe and left occipital lobe metastatic lesions - largest in left occipital  lobe. Unchanged nearly symmetric enhancement along the course of bilateral cranial nerve VII in both internal auditory canals, suspicious for leptomeningeal metastasis. Differential includes bilateral Bell's palsy.  Slightly decreased size of left anterolateral frontal lobe, left paramedian frontal lobe, and cerebellar vermis metastatic lesions. Persistent diffuse vasogenic edema in left parietal, left occipital, and left posterior temporal lobes with mild mass effect on posterior aspect of left lateral ventricle, similar to recent CT heads but worsened since MRI brain 2024.    - Routine EE24 This Routine EEG recorded during wakefulness and drowsiness is abnormal. Intermittent, left occipital predominant, mixed theta and delta slowing suggests left occipital focal neuronal dysfunction.       Psychiatric History:  Depression: situational  Anxiety: no  Psychosis: no  Psychiatric Admissions: no    Medical History:  Squamous cell carcinoma       Social History:  Driving: no due to peripheral vision loss  Working: no    I reviewed Allergies, Medical History, Surgical History and Family History as documented in Epic/Care Everywhere      Review of Systems:  Constitutional:  Negative for appetite change, fatigue and fever.   HENT: Negative for hearing loss, tinnitus, trouble swallowing and voice change.    Eyes: Negative for photophobia, pain and visual disturbance.   Respiratory: Negative for shortness of breath.    Cardiovascular: Negative for palpitations.   Gastrointestinal: Negative for nausea and vomiting. + Pain anal area from tumor  Endocrine: Negative for cold intolerance.   Genitourinary: Negative for dysuria, frequency and urgency.   Musculoskeletal:  Negative for back pain, gait problem, myalgias, neck pain and neck stiffness.   Skin: Negative for rash.   Allergic/Immunologic: Negative for hives.  Neurological: Negative for dizziness, tremors, syncope, speech difficulty, weakness, light-headedness,  "numbness and headaches.   Hematological: Negative for easy bruising and bleeding  Psychiatric/Behavioral: Negative for confusion, hallucinations and sleep disturbance.         Objective   /63 (BP Location: Left arm, Patient Position: Sitting, Cuff Size: Standard)   Pulse 70   Ht 5' 2\" (1.575 m)   Wt 51.3 kg (113 lb)   BMI 20.67 kg/m²        GENERAL EXAMINATION:   In general patient is well appearing and in no distress.    NEUROLOGIC EXAMINATION:     Alert and oriented to person, date, location. Fund of knowledge is full. Recent and remote memory were intact  Mood and affect are appropriate. Attention is intact.  Language function including fluency, naming, and comprehension intact.    Cranial nerves: Pupils are equal round reactive to light and accommodation. Right peripheral field cut. Extraocular movements are intact without nystagmus. Facial sensation is intact to light touch. Right facial droop, face activates symmetrically. There is no dysarthria. Hearing was intact to finger rub. Tongue and uvula are midline and palate elevates symmetrically. Shoulder shrug  5/5.    Motor Exam:  No pronator drift. Bulk and tone are normal. Strength is 5/5 throughout.    Deep tendon reflexes: Biceps 2+, brachioradialis 2+, patellar 2+, Achilles 2+ bilaterally.     Sensation: Intact to light touch in all four extremities    Coordination: Finger nose finger intact    Gait: Negative romberg. Normal casual gait.         Administrative Statements     I spent a total of 60 min with the patient and reviewing past medical history and office visit notes .This time was spent specifically discussing the patient's diagnosis, and plan as detailed above.    Voice recognition software was used in the generation of this note. There may be unintentional errors including grammatical errors, spelling errors, or pronoun errors.   "

## 2024-09-17 NOTE — TELEPHONE ENCOUNTER
The pharmacy stated that they wont approve patient medication that was sent over today because it was written wrong.  They sent a clarification request to the office & just want to make the Xu Alanis DO aware so it can be sent back asap    Patient is requesting a call when it has been sent back to the pharmacy because she says that she really needs this medication.

## 2024-09-17 NOTE — PATIENT INSTRUCTIONS
Continue levetiracetam at current dose.     Get levetiracetam level with next set of labs.    Call office if you experience any episodes of confusion, disorientation or waves/flashing lights in vision.    Seizure safety was reviewed including:  Avoid working at heights.  Avoid operating heavy machinery.  Avoid working around open bodies of water.   Do not not swim alone.   Showers are safer than baths due to risk of drowning in bath tub  Per [facility] Law, you cannot drive for 6 months from the date of your last seizure.         Seizure first aid was also reviewed:  Nothing should be placed in the patient's mouth during a seizure.   A pillow can be placed under the patient's head during the seizure.   After the seizure has ended, the patient should be placed on their side in recovery position to facilitate clearing of oral secretions.

## 2024-09-17 NOTE — TELEPHONE ENCOUNTER
"Safety checklist prior to [facility] will visit:    \"Thank you for inviting us to your home.  In order to ensure we arrive politely, and conduct ourselves safely in your home, please answer the following questions:\"    1 - Where should we park when we arrive?       On street    2 - Are there security doors, shepard, or fences that we will need a code for?       no    3 - Who else will be present at the visit to support you? Patient lives alone       \"We want to conduct the visit with you in the way that feels most comfortable for you.  If we need to schedule around another person's time, please let us know.\"    4 - Do you have any pets or other animals in your home?       no       \"If you have a dog, please make sure they are crated, leashed, or placed in a separate room.  Birds, reptiles, and other small animals like gerbils should be kept in a safe enclosure.  This helps our team stay on track with your visit.  We are here to see you, not your pets!\"    5 - Do you have any firearms or other weapons in the home?       denied       \"Please store and lock all weapons prior to our arrival.\"    6 - Do you feel acutely sick, or have you had recent sick contacts? no       \"If you have upper respiratory symptoms, such as cough, sneeze, congestion, or a cold, please use a mask for the safety of our team.  We will always mask for your safety as well.\"    7 - Are there any environmental hazards around or near your home now, or at the time of our visit?   denied   \"Environmental hazards may include exterior items like storm damage or road construction.  Internal hazards might be home renovations, or pests like ants, flies, or mice.\"    8 - Please have all medicines prescribed to you set up where they may be reviewed for safety. Reviewed with patient    9 - If you use marijuana medically, or any recreational drugs, please store these out of sight prior to our arrival. Reviewed with patient  "

## 2024-09-17 NOTE — PROGRESS NOTES
HEMATOLOGY / ONCOLOGY CLINIC FOLLOW UP NOTE    Patient Ayla Nye  MRN: [id number]  : 1950  Date of Encounter 2024    Reason for Encounter: hospital follow up, pathology follow up for pelvic mass biopsy     C1 2024  C2  2024  C3 2024  C4 2024  C5 2024  C6 2024 at [facility] f/b hospitalization for neutropenic fever     First line metastatic treatment     Carboplatin AUC 5 Pemetrexed 500 mg/m2 and Pembrolizumab 200 mg IV every 3 weeks x 4 cycles     C1 held due to hospital admission  C1 held due to radiation and concurrent high-dose steroid use    Oncology History   Malignant neoplasm of upper lobe, right bronchus or lung (HCC)    Initial Diagnosis    Primary lung adenocarcinoma, right (HCC)     3/26/2024 Biopsy    A-C. Lymph Node, Level 4R :    - Metastatic non-small cell carcinoma, most compatible with a primary lung adenocarcinoma; see note.       D-F. Lymph Node, Level 10R:    - Metastatic non-small cell carcinoma; see note.       G-I. Lymph Node, Level 4L:    - Metastatic non-small cell carcinoma; see note.       4/15/2024 -  Cancer Staged    Staging form: Lung, AJCC 8th Edition  - Clinical stage from 4/15/2024: Stage IIIB (cT2b, cN3, cM0) - Signed by Rogelio Beebe DO on 4/15/2024       2024 - 2024 Chemotherapy    alteplase (CATHFLO), 2 mg, Intracatheter, Every 1 Minute as needed, 6 of 6 cycles  CARBOplatin (PARAPLATIN) IVPB (GOG AUC DOSING), 130.4 mg, Intravenous, Once, 6 of 6 cycles  Administration: 130.4 mg (2024), 144.8 mg (2024), 138.4 mg (2024), 144.8 mg (2024), 132 mg (2024), 143.6 mg (2024)  PACLItaxel (TAXOL) chemo IVPB, 45 mg/m2 = 67.2 mg (90 % of original dose 50 mg/m2), Intravenous, Once, 6 of 6 cycles  Dose modification: 45 mg/m2 (original dose 50 mg/m2, Cycle 1, Reason: Anticipated Tolerance)  Administration: 67.2 mg (2024), 67.2 mg (2024), 67.2 mg (2024), 67.2 mg (2024), 67.2 mg  (6/21/2024), 67.2 mg (7/2/2024)     5/23/2024 - 7/5/2024 Radiation    Treatment:  Course: C1    Plan ID Energy Fractions Dose per Fraction (cGy) Dose Correction (cGy) Total Dose Delivered (cGy) Elapsed Days   R Lung_Hilum 6X 30 / 30 200 0 6,000 43      Treatment dates:  C1: 5/23/2024 - 7/5/2024 9/19/2024 -  Chemotherapy    cyanocobalamin, 1,000 mcg, Intramuscular, Once, 1 of 3 cycles  Administration: 1,000 mcg (8/19/2024)  alteplase (CATHFLO), 2 mg, Intracatheter, Every 1 Minute as needed, 0 of 6 cycles  fosaprepitant (EMEND) IVPB, 150 mg, Intravenous, Once, 0 of 6 cycles  CARBOplatin (PARAPLATIN) IVPB (GOG AUC DOSING), 525 mg, Intravenous, Once, 0 of 6 cycles  pemetrexed (ALIMTA) chemo infusion, 500 mg/m2 = 735 mg, Intravenous, Once, 0 of 6 cycles  pembrolizumab (KEYTRUDA) IVPB, 200 mg, Intravenous, Once, 0 of 5 cycles       Discussion      cT2b N3 M0 Stage IIIB adenocarcinoma lung     Caris  Kras C12V, p53  PDL1 TPS 5%  TMB 16 mut/Mb   Other  mutations negative      The optimal therapy of resectable Stage III NSCLC consists of systemic treatment combined with local approach with radiation and/or surgery.  Strategies include surgery followed by adjuvant chemotherapy  neoadjuvant chemotherapy or chemoimmunotherapy followed by surgery, neoadjuvant chemoradiotherapy followed by surgery or concurrent or sequential chemotherapy with definitive radiation therapy.      The accepted standard remains concurrent chemoradiotherapy although this is being challenged     The potential benefit of adding surgery to combined chemoradiotherapy for stage IIIA disease was noted in the Intergroup 0139 trial.  T1-3N2 NSCLC were assigned to concurrent chemorads 45 Gy with two cycles of Cisplatin/Etoposide followed by either surgery of continued chemorads.  PFS was longer in the surgery arm with no OS difference.      The two chemotherapy regimens that have been most commonly used in the [place] are the combination of  "cisplatin and etoposide and weekly carboplatin and paclitaxel:     Concurrent cisplatin (50 mg/m2 on days 1, 8, 29, and 36) plus etoposide (50 mg/m2 daily on days 1 to 5, and 29 to 33) with thoracic RT followed by two cycles of cisplatin plus etoposide was evaluated in a multicenter phase II trial of 50 patients with pathologically confirmed stage IIIB disease  With an average follow-up of 52 months, the three- and five-year survival rates were 17 and 15 percent, respectively. Treatment was complicated by grade 4 neutropenia in 32 percent and grade 3 or 4 esophagitis in 20 percent of patients. In a subsequent phase II trial, better results were seen with the same concurrent chemoradiation regimen followed by docetaxel consolidation, but the use of consolidation chemotherapy in this setting has been called into question by subsequent data       In a randomized phase II trial, carboplatin (area under the curve [AUC] = 2) and paclitaxel (45 mg/m2) given weekly with radiotherapy (63 Gy) followed by two cycles of consolidation therapy (carboplatin AUC = 6, paclitaxel 200 mg/m2) resulted in a median survival of 16.3 months.      Thus, for chemoradiation, the choices are cisplatin plus etoposide, in conjunction with once daily RT to a total of 60 Gy. An alternative \"radiosensitizing chemotherapy\" approach uses weekly carboplatin plus paclitaxel with approximately 60 Gy of radiation, followed by two cycles of consolidation with this same chemotherapy combination at standard systemic doses. The combination of pemetrexed and platinum agents has emerged as another acceptable alternative for stage III patients with nonsquamous cell histology.      Based on preclinical evidence demonstrating synergy between RT and IO, the Phase III [facility] trial used a PDL1 inhibitor Durvalumab after concurrent CRT in a randomized study involving 713 patients.  After median follow up of 34 months giving drug every 2 weeks for 12 months as " consolidation, the use of durvalumab significantly improved PFR from 5.6 to 16.9 months and led to a significant improvement in OS with HR 0.72.  The incidence of new mets was also lower including brain mets.      Grade 3 or 4 adverse events occurred in 31 percent of the patients who received durvalumab and 26 percent of those who received placebo, with the most common severe adverse event being pneumonia. Adverse events of any grade that were of special interest, regardless of cause, were reported in 66 percent of patients receiving durvalumab versus 49 percent of those receiving placebo, most of which were grade 1 or 2. Such events for durvalumab versus placebo included diarrhea (18 versus 19 percent), pneumonitis (13 versus 8 percent), rash (12 versus 7 percent), and pruritus (12 versus 5 percent). Adverse events of special interest for which patients received concomitant treatment were reported in 42 versus 17 of patients receiving durvalumab and placebo, respectively, with treatments typically including steroids, endocrine agents, and occasionally other immunosuppressive agents. Patient-reported quality of life was comparable between the two groups      These results demonstrate efficacy and tolerability of durvalumab for treatment of unresectable stage III NSCLC in patients who experience an objective response or stable disease following completion of chemoradiotherapy.     For this patient weekly Carboplatin AUC 2 and Taxol 45 mg/m2 weekly x 6 weeks with RT to 60-63 Gy will be used.  IO therapy, based on response, will be considered per the [facility] trial as above     Metastatic Disease     Metastatic NSCLC/adenocarcinoma  An improved understanding of the molecular pathways that drive malignancy in NSCLC has led to the development of agents that target specific molecular pathways in malignant cells. These agents have been a significant step forward in the treatment of patients whose tumors contain specific  mutations in these pathways.  For this patient, Caris testing is pending     For those with tumor PD-L1 expression of 50 percent or higher, pembrolizumab or atezolizumab monotherapy is given; this  has demonstrated improvement in overall survival (OS) compared with doublet chemotherapy alone in this population. However, the combination of chemotherapy and pembrolizumab is preferred for those with rapidly progressive disease and is an acceptable alternative for others.     For patients with PD-L1 expression of less than 50 percent, we favor a combination of doublet chemotherapy with concurrent pembrolizumab. Although pembrolizumab monotherapy is a [place] Food and Drug Administration (FDA)-approved option for patients with PD-L1 expression of 1 to 49 percent, a chemotherapy/pembrolizumab combination is preferred when feasible. In the randomized trial leading to this approval, results demonstrating superiority of pembrolizumab monotherapy over chemotherapy among patients with tumor PD-L1 expression of 1 percent or greater were driven by the subset of patients with high tumor PD-L1 expression (50 percent or higher).     Cisplatin-based regimens are slightly more effective than carboplatin-based combinations or nonplatinum regimens, but are associated with increased nonhematologic toxicity. Given that treatment is palliative in this setting, we opt for a carboplatin-based regimen for the majority of patients who will receive a chemotherapy doublet. Pemetrexed-based regimens should only be used in patients with nonsquamous histology. When combination chemotherapy is the initial treatment, it generally is limited to four to six cycles.      For patients with nonsquamous NSCLC and PD-L1 expression <50 percent,  the standard is the combination of pemetrexed, carboplatin or cisplatin, and pembrolizumab. This regimen has demonstrated improvements in multiple efficacy endpoints, including OS, relative to pemetrexed and carboplatin  alone     Alternative options for patients with nonsquamous NSCLC and PD-L1 expression <50 percent are as follows:  The platinum-based doublet may instead be combined with bevacizumab, which increases progression-free survival (PFS) and OS relative to chemotherapy alone. A choice among these options takes into account patient and provider preferences, given that head-to-head comparisons of platinum combinations with either bevacizumab or pembrolizumab are not available. However, the magnitude of PFS improvement is more impressive with pembrolizumab than in the bevacizumab trials.     -The combination of platinum-based doublet chemotherapy, bevacizumab, and atezolizumab is another potential alternative, and this regimen now has regulatory approval for patients without an epidermal growth factor receptor (EGFR) mutation or anaplastic lymphoma kinase (ALK) translocation.     For patients with squamous NSCLC and PD-L1 expression <50 percent,  carboplatin with either paclitaxel or nabpaclitaxel, and pembrolizumab, based on the demonstrated survival benefit of this regimen over chemotherapy alone for patients with advanced, squamous NSCLC.      Nivolumab plus ipilimumab is another FDA-approved option for patients with PD-L1 expression ?1 percent; nivolumab and ipilimumab is also approved with two cycles of platinum-based chemotherapy in tumors, irrespective of PD-L1 expression.        Patient now with brain mets after completion of CRT ; SRS completed 8th August 2024        Assessment/Plan:     Ms. 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  She has been tolerating Hydrea 500 mg once daily Monday through Friday and off on Saturday and Sunday. Patient also with 4.1 cm RUL mass 1.5cm spiculated posterior nodule mediastinal adenopathy, no metastatic disease including brain MRI new diagnosis      ET:      Previously treated with hydroxyurea (HYDREA)  "500 mg capsule     Plts 498 > 434 > 388 (holding hydroxyurea since 5/23/2024 2/2 drop in platelets from chemotherapy)     Plts 166 (7/4/2024) s/p C6 chemo, continuing to hold hydroxyurea, recheck labs and will plan to restart once labs show thrombocytosis.     Does not need to be restarted on hydrea; labs from 8/1/2024 were 192     Plts 8/24/2024 were 227      NSCLC/adenocarcinoma  Perianal Squamous Cell Carcinoma (metastasis vs. new primary)     Now with RUL 4.2 cm mass with mediastinal adenopathy at least cT2a cN2 cM0 lung cancer     cT2b N3 Stage IIIB lung      Please see above regarding discussion      Carboplatin AUC 2 Taxol 45 mg/m2 weekly with RT     Durvalumab adjuvant post treatment depending on response     Neuropathy has resolved after last cycle of Taxol.     Esophagitis appearing better after completing radiation.     7/1/2024     Was admitted 6/22-6/24/2024 for neutropenic fever     WBC 2.5 ANC 1810 Hgb 9.0 plts 240 (hx ET/off hydrea)     Continue with C6 7/2/2024; last radiation 7/5/2024       7/15/2024     Completed C6 on 7/2/2024 and last radiation 7/5/2024.  Was admitted for neutropenic fever after last cycle of chemo.  Discharged on 7/4/2024 and has been asymptomatic since.  Labs at discharge showed WBC 1.5, ANC 1317 and Plt 166.     Repeat CT PET 8/26/2024, radiology read from CT CAP in ED showed \"progression\" of disease but this study was performed during chemoradiation, so it is confounded by the inability to distinguish disease from inflammation.  Will  treatment response on repeat CT PET.     Recheck labs to confirm neutropenia has resolved.     Hold Otezla for psoriasis until neutropenia has resolved, but would like to restart as soon as safe as psoriasis is rebounding.     8/16/2024     Was admitted for new onset brain mets 7/30/2024; on decadron taper down to 1 mg daily next week     S/p SRS 6 fractions completed 8 August 2024 at [place]     Had modest response to treatment per PET in " terms of the lung     At issue clearly are the new brain lesions 2.0x.4 left occiput as well as multiple other smaller lesions as below     In addition PET with new SUV 9.9 mass in the right anorectal fat/right ischial tuberosity 2.7x2 which is painful to patient and feels is growing     Will get MRI pelvis; will probably need biopsy.  Less likely abscess and more likely disease but unusual place for a NSCLC to metastasize to     Will start Carboplatin/Pemetrexed/Pembrolizumab and had long discussion regarding this     9/17/2024    Referral to radiation oncology for prompt evaluation and treatment (d/w Dr. Rios)    Hold carboplatin and pemetrexed for radiation therapy    Hold pembrolizumab given currently taking high-dose steroid    Pain control with Tylenol up to 3 g daily, ordered meloxicam 200 mg BID, patient would like to avoid opioids as she lives alone, palliative following    Continue steroid taper as given but do not decrease below dexamethasone 4 daily for now given tendency to develop cerebral vasogenic edema with lower doses.  This does limit our ability to give immunotherapy which she may benefit from.  We will revisit this in future visits.    IgG deficiency    IgG mildly low at 571.  This is not significantly decreased enough to cause the recurrent infections.  Will continue to monitor on subsequent labs.  No need for intervention at this time.    - IgG, IgA, IgM;levels intermittently drawn     Elevated TSH as Acute Phase Reactant vs. Subclinical Hypothyroidism    Prior to immunotherapy, labs showed TSH 16.3 and FT4 0.77 (8/24/24).  Patient was started on Synthroid 50 mcg. After just a few doses, labs were drawn as part of hospital admission showed TSH 2.41 (9/9/24) TSH 0.40 and FT4 0.92 (9/13/24).  She was advised to hold her Synthroid and we will recheck labs in 6 weeks.  Suspect elevated TSH was an acute phase reactant and not subclinical hypothyroidism.     Follow up     Follow up in 1 week           History of present illness:  Initial Visit 4/10/2024     Ayla Nye is a 73-year-old female with past medical history of hypertension, psoriatic arthritis, CKD stage III, hyperlipidemia, and latent TB.  She is seen in follow-up for essential thrombocytosis.  She has had an elevated platelet count dating back to January 2028 all of her other cell lines were within normal limits.  Her highest platelet count was around 700,000.  Myeloproliferative work-up demonstrated positive JAK2 mutation and she was started on 500 mg of Hydrea daily in July 2020.  Due to leukopenia her dose was reduced to Monday, Wednesday, Friday.  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  Then again in July we had to decrease her dose and she is taking 500 mg once daily Monday through Friday and not taking any on Saturday or Sunday.     She has been tolerating the 500 mg of Hydrea Monday through Friday off Saturday and Sunday well without any side effects.  She was seen by my attending and underwent further workup of her recurrent infections and was found to have a mildly low IgG level.   She has had pneumonia twice once in October and then again in February.  She states that she has had multiple imaging of her chest which does suggest scarring which prompted a CT scan of her chest .  .  She is also had recurrent sinus infections.  She is a former smoker and quit 15 years ago bit was 1[[d x more than 30 years.        She does not have any continued symptoms of pneumonia and no cough, shortness of breath, or fevers.       She underwent the following testing; EBUS with pulmonary as well as CT chest/PET scan     Lung mass on CT chest 2/28/2024     LUNGS:     -4.1 x 3.4 cm solid mass in the anterior right upper lobe (series 302, image 73).     -Posterior  to this mass, there is a 1.5 x 1.5 cm spiculated nodule (series 302, image 78)     -2.5 x 2.1 cm groundglass nodule in the anterior left upper lobe (series 302, image 90)     Mild emphysema.     Right apical pleural-parenchymal scarring.     PLEURA: Small calcified left posterior pleural plaque, which may be from prior asbestos exposure or the sequela of old inflammation.     HEART/GREAT VESSELS: Normal heart size. Mild-moderate coronary artery calcification. Mild to moderate mitral annular calcification. Trace pericardial effusion. No thoracic aortic aneurysm.     MEDIASTINUM AND SUDEEP: 1.6 x 2.0 cm right lower paratracheal/precarinal lymph node. 1.3 x 1.3 cm right lower paratracheal lymph node.        CT PET  3/28/2024     Intensely FDG avid right upper lobe mass, SUV max of 18. This abuts the anterior pleural margin. Central photopenia suggest necrosis. Mass measures on the order of 4.2 cm in size, stable previously 4.1 cm.     Subjacent 1.5 cm spiculated nodule posteriorly demonstrates minimal uptake, SUV max of 1.7.     Minimal FDG uptake in the left upper lobe groundglass nodule, SUV max of 2.2. This measured up to 2.5 cm in size on recent CT, appears grossly stable on low-dose CT.     A few FDG-avid mediastinal lymph nodes. Right anterior paratracheal lymph node demonstrates SUV max of 13. This measures up to 1.9 cm short axis image 85 series 3, may be slightly larger previously 1.6 cm.     A right perihilar lymph node demonstrates SUV max of 12. This measures on the order of 1.2 cm short axis image 89 series 3.  CT images: Scattered emphysematous changes of the lung fields. Mild to moderate coronary artery calcifications. Minimal left pleural calcification. Small calcified lymph nodes in the left perihilar region        3/26/2024     -C. Lymph Node, Level 4R (ThinPrep, smears and cell block preparations):    - Metastatic non-small cell carcinoma, most compatible with a primary lung adenocarcinoma; see note.     - Satisfactory for evaluation.     D-F. Lymph Node, Level 10R (ThinPrep, smears and cell block preparations):    - Metastatic non-small cell carcinoma; see note.    - Satisfactory for evaluation.     G-I. Lymph Node, Level 4L (ThinPrep, smears and cell block preparations):    - Metastatic non-small cell carcinoma; see note.    - Satisfactory for evaluation.      7/3/2024    CT CHEST, ABDOMEN AND PELVIS WITH IV CONTRAST     INDICATION: hx of lung cancer, fever, cough. Fever (102.9 degrees Fahrenheit), cough, recently had chemotherapy.     COMPARISON: February 28, 2024     TECHNIQUE: CT examination of the chest, abdomen and pelvis was performed. Multiplanar 2D reformatted images were created from the source data.     This examination, like all CT scans performed in the [facility], was performed utilizing techniques to minimize radiation dose exposure, including the use of iterative reconstruction and automated exposure control. Radiation dose length   product (DLP) for this visit: 314 mGy-cm     IV Contrast: 100 mL of iohexol (OMNIPAQUE)  Enteric Contrast: Not administered.     FINDINGS:     CHEST     LUNGS: There is an approximately 4.6 x 4.8 x 3.3 cm mass within the anteromedial aspect of the right upper lobe of the lung. This mass abuts the anterior and anteromedial pleural surface and the right hand aspect of the upper mediastinum. The mass   demonstrates peripheral spiculation and areas of low density centrally, suggesting areas of necrosis. Just posterolateral to this mass there is a spiculated satellite lesion measuring approximately 1.6 cm. There is also a spiculated satellite lesion   anterior and inferior to the dominant mass, measuring approximately 1.4 cm and abutting the anterior pleural surface (series 302 image 91); this satellite mass appears new compared with February 28, 2024. An ill-defined groundglass opacity in the left   upper lobe of the lung measures approximately 2.5 cm,  similar to the prior study.     There is mild to moderate emphysema. There is mild bibasilar scarring versus atelectasis. Right apical scarring unchanged.     PLEURA: Left pleural calcifications unchanged. No pleural effusions. No pneumothorax.     HEART/GREAT VESSELS: Coronary artery disease. There is atherosclerosis of the thoracic aorta. No thoracic aortic aneurysm.     MEDIASTINUM AND SUDEEP: There is a 2.4 x 2.2 cm right paratracheal node demonstrating low density centrally suggesting necrosis; this is larger compared with February 28, 2024. Additionally, there is a 2.8 x 2 cm precarinal node demonstrating low density   centrally suggesting necrosis, also larger compared to the prior study. Necrotic appearing right hilar adenopathy measuring up to 2 cm.     CHEST WALL AND LOWER NECK: Tiny thyroid nodules, measuring 6 mm or less. Incidental discovery of one or more thyroid nodule(s) measuring less than 1.5 cm and without suspicious features is noted in this patient who is above 35 years old; according to   guidelines published in the February 2015 white paper on incidental thyroid nodules in the Journal of the American College of Radiology (JACR), no further evaluation is recommended.     AInterval History:  6/3/2024     Ms Nye is tolerating treatment fairly well but did have a reaction to Taxol at C2.  She has flushing and chest pain; drug was stopped she was given Benadryl/steroids/fluids and started at slower rate.  She has not had issues with N/V but with constipation.  Her joints are more painful.  She is eating but taste is an issue, probably due to Carboplatin.  She denies any GERD but has increased cough which is from CRT.  She has no SOB.  We discussed Mucinex to help cut the phlegm generated from treatment.  Her weight is 110 pounds which is slightly decreased; her BP is 118/64. She has no other issues          Interval History:  6/17/2024     Doing well; in week 5.  Has esophagitis and carafate  "slurry  was prescribed; she has not started it.  No N/V, has minimal numbness mostly feet.  Had issue with veins and extravasation fluid left lower arm.  Some reddness, healing, no infection.  Labs ok plts 388 WBC 5.9 Hgb 9.4.  States psoriasis is better.  Her weight is decreased to 104 but she is eating but less.       Interval History:  7/1/2024 6/22/2024 admitted due to neutropenic  fever, rigors.  She also endorsed fatigue which was post chemo.  In the ED, she was found to be fulfilling the SIRS criteria given her low white count, she was empirically started on broad-spectrum antibiotic.  On the following day antibiotics were stopped as she continued to improve.  During her hospitalization, she also developed diarrhea which was to be noninfective in origin.  She also received her scheduled radiation therapy  Discharged 6/24/2024; chemotherapy was held for neutropenia     She is doing better today; very anxious to stop treatment this week.  Feeling improved from admission.      Labs improved as above WBC 2.5 ANC 1810 plts 240 Na 141 k 4/1 Cr 0.85 ALT/AST 8/10      Interval History:  7/15/2024   Completed C6 on 7/2/2024 at [place] and completed radiation on 7/5/2024.  Was admitted at [facility] for neutropenic fever after last cycle of chemo.  Discharged on 7/4/2024 and has been asymptomatic since.  CT CAP showed radiology read of \"progression\" of disease, but this scan was taken during chemoradiation and cannot distinguish between disease and inflammation.  Labs at discharge showed WBC 1.5, ANC 1317 and Plt 166.  Denied fever/chills, night sweats, adenopathy, or weight loss.  Endorsed worsening of psoriasis.  Neuropathy has resolved after last cycle of chemo.  Continues off hydroxyurea 2/2 normal platelet counts and continues off Otezla for psoriasis.            Interval History:  8/16/2024     Patient completed CRT in early July 2024, was admitted for NF and on that scan there appeared to be increase " "in the RUL mass but again had just finished concurrent treatment with significant inflammation     On 30th July , patient early in the day was well, mowing grass and then noted loss of balance, disorientation, confusion.  She also had vision \"flashes\" in the right field.  She was seen at [place] as below:     Admission 7/30/2024    status post concurrent weekly Taxol/carboplatin with radiation therapy on May 2024 and finished on 7/5/2024, the patient had change in mental status, imbalance, nausea admitted to the hospital on 7/29/2024, MRI of the brain showed multiple supratentorial and infratentorial enhancing lesion associated with vasogenic edema the largest lesion in the left occipital lobe measuring 2 x 1.4 cm with adjacent edema    Treatment options from a radiation therapy standpoint include SRS/SRT or whole brain radiation ± hippocampal avoidance. Given the small size and relatively small number of metastases, SRS would be the preferred treatment approach. I discussed with Dr. Norwood with consensus for SRS.     Possible short- and long-term side effects inclide but are not limited to, fatigue, headache, nausea, alopecia, vomiting, injury to the cranial nerves and/or visual structures including the optic nerve and chiasm, a low risk of neurocognitive effects, neurologic deficits, radionecrosis which may require treatment with steroids or surgical resection, hearing loss, stroke-like symptoms, and a low risk of radiation-induced secondary malignancy. Even with appropriate treatment, there is a risk of progression.  One some occasions, additional/other tumors may develop requiring re-evaluation.        MRI Brain 7/30/2024     1. Multiple supratentorial and infratentorial enhancing lesions with associated vasogenic edema, the largest of which is in the left occipital lobe. Findings are most consistent with metastases.     2. No acute infarct or midline shift.     She had the PET scan done after discharge as " below:        PET scan 8/5/2024     Persistent hypermetabolic medial right upper lobe lung mass measuring 3.3 x 2.7 cm on image 132 of series 3 with max SUV of 10.0, previously measuring 3.7 x 2.9 cm with max SUV of 18.0 when utilizing similar measurement technique.     On image 132 of series 3 there is a stable adjacent 7 mm right upper lobe lung nodule with max SUV of 2.1, previously similar in size with max SUV of 1.7     Improving hypermetabolic right paratracheal mediastinal and right hilar yaw metastatic disease. For example, on image 137, hypermetabolic right paratracheal lymph node measures 7 mm in short axis with max SUV of 3.7, previously approximately 1.3 cm   with max SUV of 13.0.     Stable mildly FDG avid semisolid groundglass nodular opacity involving the left upper lobe measuring 2.6 x 1.1 cm on image 142 of series 3 with max SUV of 1.9, previously similar in size with max SUV of 2.2.  CT images: Atherosclerotic vascular calcifications including those of the coronary arteries are noted. Emphysematous changes.     1. New hypermetabolic soft tissue mass involving the right anorectal fat medial to the right ischial tuberosity. While this finding could reflect hypermetabolic metastatic disease related to patient's primary bronchogenic carcinoma the distribution is   atypical for metastatic disease from bronchogenic carcinoma. Therefore, consider further evaluation with tissue sampling to exclude secondary hypermetabolic malignancy.     2. Persistent hypermetabolic right upper lobe bronchogenic carcinoma with hypermetabolic mediastinal and right hilar yaw metastatic disease which overall is improved since prior PET/CT.     3. Hypermetabolic left occipital lobe metastatic disease. Patient's known intracranial metastatic disease was better characterized on prior MRI of the brain.     Today, she completed SRS at [place] and is on a steroid taper; 2 mg daily decadron and will go to 1 mg daily decadron next  week.  Completed RT 8 August 2024.  Doing well, no confusion, ambulating without issue.  Is on Keppra which she will remain on; 500 mg bid given.  Decadron will continue for now and probably for another additional week due to potential recall inflammation with Pembrolizumab.  She will get first line treatment in the metastatic setting with Carboplatin/Pemetrexed/Pembrolizumab.  Would like to start in one week.  Consent obtained.  Concern with IO causing more inflammation in the brain  as crosses BBB and thus may continue on 1 mg decadron which is equivalent to 5 mg prednisone and thus will not impede efficacy of IO therapy     Does have new right ischial tuberosity lesion on PET which she is having pain.  Will get MRI and may need RT for pain palliation but unclear if this is metastatic disease vs new primary as unusual for lung to spread in this fashion.     She has no fevers, bowel issues/changes to suggest abscess.            Interval History:  9/3/2024        Admitted 8//21-25 for increased vasogenic edema from too quick steroid taper.  Will have to do slower taper; hold IO until at 2 mg decadron     Current on 1 mg 3x this week and then 1 mg for one day next week then off.  Did have hairline fracture elbow due to fall after discharge.     Had an MRI right pelvis 8/30/2024 due to increasing pain and lesion noted on PET which is larger and has more pain    Soft tissue mass in the right ischioanal fat measuring 3.8 x 2.6 x 3.4 cm (5/33, 2/11), corresponding to the FDG avid lesion seen on prior PET/CT. It has hyperintense T2 weighted and hypointense T1-weighted signal with thick nodular   and irregular enhancement predominantly along its periphery with hypoenhancement at the center. Lesion size and has increased in size from prior PET CT dated 8/5/2024 at which time it measured 2.7 x 2.0 cm and CT study dated 7/3/2024 at which time it   measured 0.9 x 0.7 cm. Overall findings are suspicious for a malignant lesion,  likely metastasis from patient's cancer.    It is very unlikely resectable, most likely due to her NSCLC but will attempt biopsy.    Will speak with Dr Gamboa and see if can get palliative radiation to control pain.  Is 3.8x2.6x3.4 cm noted on PET as well.  Was there in June and was 0.9x0.7 cm and 8/5/2024 was 2.7x2.0    She has not felt confused back on decadron and am concerned with stopping decadron    She will now be treated in the C2 slot on 16th Sept 2024 with her first cycle of Carboplatin/Alimta/Pembrolizumab    Her TSH baseline was elevated at 16.4 with free T4 0.77 and thus will start synthroid 50 mcg daily repeat TFTs in 6 weeks     Interval History: 9/17/2024    Patient is here for treatment and hospital follow up.    Admitted 9/9/24 to 9/12/24 for AMS found to have MRI Brain showing increased cerebral vasogenic edema after recently steroid taper (not stopped), c/f leptomeningeal mets, evidence of new tiny temporal lobe met, evidence of unchanged parietal and occipital mets, and evidence of decreased frontal lobe and cerebellar mets.  She was also noted to have a new mass of her deep R gluteal fossa.  Her mental status rapidly improved with steroids.  Patient underwent LP and biopsy of her R gluteal mass.  Initial LP studies showed 0 WBCs, 0 RBCs, normal glucose, slightly elevated protein at 73, and Gram stain and cultures were negative ruling out meningitis and ICH.  No evidence thus far of leptomeningeal metastasis on LP.  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    She was placed on a steroid taper at discharge on 9/12 as follows:       -dexamethasone 4 BID x4 days f/b       -dexamethasone 4 AM and 2 PM x7 days f/b       -dexamethasone 2 BID x7 days f/b       -dexamethasone 2 daily with further taper per heme/onc    Her levothyroxine was discontinued and will be followed with serial blood draws.    Labs from the day of her discharge (9/13/24) showed folate  >22, B12 1268, , WBC 8.5, Hb 10.5, Plt 264, TSH 0.403, FT40.92, and FT3 2.33.    Patient endorsed acute on chronic anal pain that has worsened since her anal mass biopsy.  The pain is sharp and severe and occasionally radiates down her leg.  It is best in the morning after taking her steroids.  She was prescribed oxycodone 5 mg Q4H (36 MME) at hospital discharge which has not helped her pain.  She spoke with palliative over the phone yesterday and has an appointment with them tomorrow.  They recommended changing from oxycodeone to Dilaudid 2 mg PO Q4H (60 MME) and they gave her Narcan.  She is very concerned about taking opioids as she lives alone and would not have anyone to give her Narcan.    She is amanable to radiation therapy and would like to start as soon as possible.        REVIEW OF SYSTEMS:  As above   Please note that a 14-point review of systems was performed to include Constitutional, HEENT, Respiratory, CVS, GI, , Musculoskeletal, Integumentary, Neurologic, Rheumatologic, Endocrinologic, Psychiatric, Lymphatic, and Hematologic/Oncologic systems were reviewed and are negative unless otherwise stated in HPI. Positive and negative findings pertinent to this evaluation are incorporated into the history of present illness.    ECOG PS: 1    PROBLEM LIST:  Patient Active Problem List   Diagnosis    TB lung, latent    Psoriatic arthritis (HCC)    Essential thrombocytosis (HCC)    Hypertension    Mixed hyperlipidemia    Encounter for monitoring of hydroxyurea therapy    JAK2 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    Age-related osteoporosis without current pathological fracture    Malignant neoplasm of upper lobe, right bronchus or lung (HCC)    Bilateral leg pain    Insomnia    Moderate protein-calorie malnutrition (HCC)    Dehydration    Visual disturbance    Metastatic cancer to brain (HCC)    Brain mass    Malignant neoplasm metastatic to brain (HCC)    Acute metabolic encephalopathy    Altered mental status     Hypothyroidism    Abnormal imaging of central nervous system    Right hip pain    Goals of care, counseling/discussion    Cancer related pain     Past Medical History:   has a past medical history of Allergic (2022), Cataract (Nov. 2023), Essential thrombocytosis (HCC), Hyperlipidemia, Hypertension, Mass in chest, Nodular goiter, Pneumonia (Oct 16, 2023), Psoriasis, and SIRS (systemic inflammatory response syndrome) (HCC) (06/22/2024).    PAST SURGICAL HISTORY:   has a past surgical history that includes Appendectomy; Mammo needle localization right (all inc) (Right, 07/13/2009); Skin lesion excision; Colonoscopy (10/31/2018); Mammo (historical); DXA procedure(historical) (04/21/2017); Breast cyst excision (Right, 2009); IR lumbar puncture (9/12/2024); and IR biopsy other (9/12/2024).    CURRENT MEDICATIONS  Current Outpatient Medications   Medication Sig Dispense Refill    aspirin 81 mg chewable tablet Chew 81 mg daily.      Cholecalciferol (VITAMIN D3) 5000 units TABS Take 5,000 Units by mouth Daily      dexamethasone (DECADRON) 2 mg tablet Take 1 tablet (2 mg total) by mouth 2 (two) times a day with meals for 63 doses PLEASE taper instructions in AVS.Take 4 mg (2 tab) twice daily starting this evening 9/12/24 and continue till the end of this week including [place] 9/15. Start taking 4 mg (2 tabs) in the morning and 2 mg (1 tab) in the evening starting 9/16 through 9/22.Start taking 2 mg (1 tab ) twice daily starting 9/23 through 9/29.Start taking 2 mg (1 tab) once daily until advised otherwise by your PCP or oncologist 63 tablet 0    diphenhydrAMINE (BENADRYL) 25 mg capsule Take 25 mg by mouth every 12 (twelve) hours as needed for itching      folic acid (FOLVITE) 1 mg tablet Take 1 tablet (1 mg total) by mouth daily 30 tablet 6    HYDROmorphone (DILAUDID) 2 mg tablet Take 1 tablet (2 mg total) by mouth every 4 (four) hours as needed for moderate pain 1 tab po q 4 hrs prn pain/dyspnea Max Daily Amount: 12 mg 30  tablet 0    levETIRAcetam (Keppra) 750 mg tablet Take 1 tablet (750 mg total) by mouth 2 (two) times a day 60 tablet 0    naloxone (NARCAN) 4 mg/0.1 mL nasal spray Administer 1 spray into a nostril. If no response after 2-3 minutes, give another dose in the other nostril using a new spray. 1 each 0    oxyCODONE (Roxicodone) 5 immediate release tablet Take 1 tablet (5 mg total) by mouth every 4 (four) hours as needed for moderate pain Max Daily Amount: 30 mg 40 tablet 0    pantoprazole (PROTONIX) 40 mg tablet TAKE 1 TABLET(40 MG) BY MOUTH DAILY EARLY MORNING 30 tablet 5    Probiotic Product (PROBIOTIC DAILY PO) Take 1 capsule by mouth daily      vitamin B-12 (VITAMIN B-12) 1,000 mcg tablet Take 1 tablet (1,000 mcg total) by mouth daily 90 tablet 1     No current facility-administered medications for this visit.     Facility-Administered Medications Ordered in Other Visits   Medication Dose Route Frequency Provider Last Rate Last Admin    fentaNYL injection   Intravenous PRN Greg Gamble CRNA        midazolam (VERSED) injection   Intravenous PRN Greg Gamble CRNA         [unfilled]    SOCIAL HISTORY:   reports that she has quit smoking. Her smoking use included cigarettes. She started smoking about 58 years ago. She has a 58.7 pack-year smoking history. She has been exposed to tobacco smoke. She has never used smokeless tobacco. She reports that she does not currently use alcohol. She reports that she does not use drugs.     FAMILY HISTORY:  family history includes Cancer in her brother and brother; Coronary artery disease in her brother; Depression in her mother; Hearing loss in her mother; Hypertension in her brother and mother; No Known Problems in her daughter, daughter, maternal aunt, maternal aunt, maternal aunt, maternal aunt, maternal grandfather, maternal grandmother, paternal aunt, paternal grandfather, and paternal grandmother; Stroke in her mother; Tuberculosis in her brother and father.      ALLERGIES:  is allergic to doxycycline, keflex [cephalexin], and neomycin-polymyxin-dexameth.    Physical Exam:  Vital Signs:   Visit Vitals  OB Status Postmenopausal   Smoking Status Former     There is no height or weight on file to calculate BMI.  There is no height or weight on file to calculate BSA.    GEN: Alert, awake oriented x3, in no acute distress  HEENT- No pallor, icterus, cyanosis, no oral mucosal lesions,   LAD - no palpable cervical, clavicle, axillary, inguinal LAD  Heart- normal S1 S2, regular rate and rhythm, No murmur, rubs.   Lungs- clear breathing sound bilateral.   Abdomen- soft, Non tender, bowel sounds present  Extremities- No cyanosis, clubbing, edema  Neuro- No focal neurological deficit    Labs:  Lab Results   Component Value Date    WBC 8.52 09/13/2024    HGB 10.5 (L) 09/13/2024    HCT 31.9 (L) 09/13/2024     (H) 09/13/2024     09/13/2024     Lab Results   Component Value Date    SODIUM 139 09/13/2024    K 3.9 09/13/2024     09/13/2024    CO2 26 09/13/2024    AGAP 7 09/13/2024    BUN 30 (H) 09/13/2024    CREATININE 1.12 09/13/2024    GLUC 130 09/13/2024    GLUF 117 (H) 09/10/2024    CALCIUM 9.0 09/13/2024    AST 10 (L) 09/13/2024    ALT 16 09/13/2024    ALKPHOS 45 09/13/2024    TP 6.6 09/13/2024    TBILI 0.24 09/13/2024    EGFR 48 09/13/2024     I spent 40 minutes on chart review, face to face counseling time, coordination of care and documentation.    Additional recommendations to follow per attending, Dr. Castro.    Xu Alanis DO, PGY4  Hematology/Oncology Fellow

## 2024-09-18 ENCOUNTER — TELEPHONE (OUTPATIENT)
Dept: RADIATION ONCOLOGY | Facility: HOSPITAL | Age: 74
End: 2024-09-18

## 2024-09-18 ENCOUNTER — IN HOME VISIT (OUTPATIENT)
Age: 74
End: 2024-09-18
Payer: MEDICARE

## 2024-09-18 VITALS
HEIGHT: 62 IN | BODY MASS INDEX: 21.46 KG/M2 | DIASTOLIC BLOOD PRESSURE: 75 MMHG | OXYGEN SATURATION: 97 % | SYSTOLIC BLOOD PRESSURE: 125 MMHG | TEMPERATURE: 97.8 F | HEART RATE: 78 BPM | WEIGHT: 116.6 LBS | RESPIRATION RATE: 16 BRPM

## 2024-09-18 DIAGNOSIS — Z51.5 PALLIATIVE CARE PATIENT: Primary | ICD-10-CM

## 2024-09-18 DIAGNOSIS — G89.3 CANCER RELATED PAIN: ICD-10-CM

## 2024-09-18 DIAGNOSIS — C34.11 MALIGNANT NEOPLASM OF UPPER LOBE, RIGHT BRONCHUS OR LUNG (HCC): ICD-10-CM

## 2024-09-18 DIAGNOSIS — C79.31 METASTATIC CANCER TO BRAIN (HCC): ICD-10-CM

## 2024-09-18 PROCEDURE — G2211 COMPLEX E/M VISIT ADD ON: HCPCS

## 2024-09-18 PROCEDURE — 99349 HOME/RES VST EST MOD MDM 40: CPT

## 2024-09-18 RX ORDER — LIDOCAINE 4 G/G
PATCH TOPICAL
COMMUNITY

## 2024-09-18 NOTE — PROGRESS NOTES
Ayla Nye was seen in the home today.  Patient seen with ZULEYMA Richards.  Patient's ex-, Sandro was present for the visit. Patient  provided all pertinent information today.      Symptom Management    ESAS Scale - Please rate from 0-10 the degree to which you experience the following symptoms (0 being none to 10 being the worst):  Pain - 2 - throbbing and pressure pain in right buttocks radiating down right leg  Tired - 0  Drowsiness - 5 -in the afternoon  Nausea - 0  Appetite - 0  Shortness of Breath - 0  Depression - 1 - when gets bad news  Anxiety - 1  Wellbeing - 5 - due to pain  Bowel movements - 4 - difficult to push to pass stool - discussed taking an OTC stool softener and increasing water intake  Sleeping - 10 - sleeps 1 1/2 to 2 hours at night due to pain - discussed changes to help alleviate pain at night     Ayla Nye 's medications were verified today with Ayla. Ayla is managing the medications. All medications are up to date.     Allergies verified. No new allergies     All medical, surgical, family and social history were reviewed with Ayla. There are no updates to patient's history.    Next visit with provider and nurse 2 weeks.    All patient's questions and concerns were addressed.  Patient and ex- were appreciative of the visit.

## 2024-09-18 NOTE — TELEPHONE ENCOUNTER
Patient was inpatient on 9/11 and spoke to . Per patient she was told someone will be reaching out to discuss treatment. Per patient she has not heard back from the office. Patient does see  and last seen provider on 8/15/24. Patient will like a call back to discuss treatment plans. Per patient she was told she will be starting RT for anal area. Advised patient I will send message to office to return her call.     Please call patient.       Thank you.

## 2024-09-18 NOTE — PROGRESS NOTES
"Per Kaye Amparo, RN: \"we can cancel her treatment for tomorrow. We will see her in the office before resuming treatment. Thanks\". Treatment on 9/19/24 cancelled. Pt is aware.   "

## 2024-09-18 NOTE — PROGRESS NOTES
[facility] Visit: follow up   Name: Ayla Nye      : 1950      MRN: [id number]  Encounter Provider: ZULEYMA Pollard  Encounter Date: 2024   Encounter department: [facility]    Assessment & Plan   1. Palliative care patient  Assessment & Plan:  Ayla follows with palliative care for psychosocial support and symptom management of lung cancer with brain mets, perianal tumor   Symptoms - pain   ACP -  on file   RTC - 2 weeks    Lives alone.   Has two daughter .   Ex- is supportive and involved in care  Has brother that lives close by that helps as well.     No longer drives.     2. 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   Follows with Dr. Castro outpatient.  Treated with chemoradiotherapy therapy.     Orders:  -     Ambulatory Referral to Palliative Care  3. Metastatic cancer to brain (HCC)  Assessment & Plan:  Follows with Dr. Castro.   Completed 6 fractions of SRS  Orders:  -     Ambulatory Referral to Palliative Care  4. Cancer related pain  Assessment & Plan:  Current regimen:  Celebrex 200mg BID  Failed therapies:  Oxycodone- felt like she had a headache from the medication  Bowel regimen to prevent OIC:  Miralax, colac  I have reviewed the patient's controlled substance dispensing history in the Prescription Drug Monitoring Program in compliance with the [facility] regulations before prescribing any controlled substance        Subjective   Chief Complaint   Patient presents with    Consult        History of Present Illness     Ayla Nye is a 73 y.o. female w/ Stage III NSCLC, new onset brain mets 24, completes SRS. She was found to have mass in the right anorectal fat/right ischial tuberosity per PET. She follows with [facility] Hem/onc ..    Patient is seen in the home with palliative RN, and ex   present.    She is waiting to hear from radiation oncology as to when she can start her therapy.   Was recently seen by neurology- no changes.  Heme/onc- prescribed Celebrex for pain. She would like to try to see if this is helpful. She has not started medication yet. Also using Lidocaine patches with have been helpful.    No issues with appetite.  BM- feels stool is hard- will add colace  Sleeping- she has pain which is keeping her up. Will see if Celebrex is helpful for pain before adding additional medication.  Mood- she feels her mood is stable, she gets upset when she is given bad news whish is understandable but she feels this passes.   Able to keep up with her ADL's- she no longer drives. Family/friends are supportive.           Current Outpatient Medications:     acetaminophen (TYLENOL) 325 mg tablet, Take 325 mg by mouth every 6 (six) hours as needed for mild pain Taking 3 tabs every 6 hours, Disp: , Rfl:     aspirin 81 mg chewable tablet, Chew 81 mg daily., Disp: , Rfl:     celecoxib (CeleBREX) 100 mg capsule, Take 2 capsules (200 mg total) by mouth 2 (two) times a day, Disp: 30 capsule, Rfl: 1    Cholecalciferol (VITAMIN D3) 5000 units TABS, Take 5,000 Units by mouth Daily, Disp: , Rfl:     dexamethasone (DECADRON) 2 mg tablet, Take 1 tablet (2 mg total) by mouth 2 (two) times a day with meals for 63 doses PLEASE taper instructions in AVS.Take 4 mg (2 tab) twice daily starting this evening 9/12/24 and continue till the end of this week including [place] 9/15. Start taking 4 mg (2 tabs) in the morning and 2 mg (1 tab) in the evening starting 9/16 through 9/22.Start taking 2 mg (1 tab ) twice daily starting 9/23 through 9/29.Start taking 2 mg (1 tab) once daily until advised otherwise by your PCP or oncologist, Disp: 63 tablet, Rfl: 0    diphenhydrAMINE (BENADRYL) 25 mg capsule, Take 25 mg by mouth every 12 (twelve) hours as needed for itching, Disp: , Rfl:     folic acid (FOLVITE) 1 mg tablet, Take 1 tablet  "(1 mg total) by mouth daily, Disp: 30 tablet, Rfl: 6    HYDROmorphone (DILAUDID) 2 mg tablet, Take 1 tablet (2 mg total) by mouth every 4 (four) hours as needed for moderate pain 1 tab po q 4 hrs prn pain/dyspnea Max Daily Amount: 12 mg, Disp: 30 tablet, Rfl: 0    levETIRAcetam (Keppra) 750 mg tablet, Take 1 tablet (750 mg total) by mouth 2 (two) times a day, Disp: 60 tablet, Rfl: 0    Lidocaine 4 % PTCH, Apply topically 4% applies to buttocks as needed every 8 to 12 hours, Disp: , Rfl:     naloxone (NARCAN) 4 mg/0.1 mL nasal spray, Administer 1 spray into a nostril. If no response after 2-3 minutes, give another dose in the other nostril using a new spray., Disp: 1 each, Rfl: 0    oxyCODONE (Roxicodone) 5 immediate release tablet, Take 1 tablet (5 mg total) by mouth every 4 (four) hours as needed for moderate pain Max Daily Amount: 30 mg, Disp: 40 tablet, Rfl: 0    pantoprazole (PROTONIX) 40 mg tablet, TAKE 1 TABLET(40 MG) BY MOUTH DAILY EARLY MORNING, Disp: 30 tablet, Rfl: 5    Probiotic Product (PROBIOTIC DAILY PO), Take 1 capsule by mouth daily, Disp: , Rfl:     vitamin B-12 (VITAMIN B-12) 1,000 mcg tablet, Take 1 tablet (1,000 mcg total) by mouth daily, Disp: 90 tablet, Rfl: 1  No current facility-administered medications for this visit.    Facility-Administered Medications Ordered in Other Visits:     fentaNYL injection, , Intravenous, PRN, Greg Gamble CRNA    midazolam (VERSED) injection, , Intravenous, PRN, Greg Gamble CRNA    Objective   /75 (BP Location: Left arm, Patient Position: Sitting, Cuff Size: Standard)   Pulse 78   Temp 97.8 °F (36.6 °C) (Temporal)   Resp 16   Ht 5' 2\" (1.575 m)   Wt 52.9 kg (116 lb 9.6 oz)   SpO2 97%   BMI 21.33 kg/m²   Physical Exam  Vitals reviewed.   Constitutional:       General: She is not in acute distress.     Appearance: She is not ill-appearing.   HENT:      Head: Normocephalic and atraumatic.   Eyes:      General: No scleral icterus.        " Right eye: No discharge.         Left eye: No discharge.   Cardiovascular:      Rate and Rhythm: Normal rate.   Pulmonary:      Effort: Pulmonary effort is normal. No respiratory distress.      Breath sounds: Normal breath sounds.   Musculoskeletal:         General: Normal range of motion.      Cervical back: Normal range of motion.      Right lower leg: No edema.      Left lower leg: No edema.   Skin:     General: Skin is warm and dry.   Neurological:      General: No focal deficit present.      Mental Status: She is alert and oriented to person, place, and time. Mental status is at baseline.   Psychiatric:         Mood and Affect: Mood normal.         Behavior: Behavior normal.         Thought Content: Thought content normal.         Judgment: Judgment normal.          Recent labs:  Lab Results   Component Value Date/Time    SODIUM 139 09/13/2024 08:22 AM    K 3.9 09/13/2024 08:22 AM    BUN 30 (H) 09/13/2024 08:22 AM    CREATININE 1.12 09/13/2024 08:22 AM    GLUC 130 09/13/2024 08:22 AM    CALCIUM 9.0 09/13/2024 08:22 AM    AST 10 (L) 09/13/2024 08:22 AM    ALT 16 09/13/2024 08:22 AM    ALB 4.0 09/13/2024 08:22 AM    TP 6.6 09/13/2024 08:22 AM    EGFR 48 09/13/2024 08:22 AM     Lab Results   Component Value Date/Time    HGB 10.5 (L) 09/13/2024 08:22 AM    WBC 8.52 09/13/2024 08:22 AM     09/13/2024 08:22 AM    INR 0.88 09/12/2024 08:58 AM    PTT 25 09/09/2024 11:48 AM     Lab Results   Component Value Date/Time    [id number] 0.403 (L) 09/13/2024 08:22 AM       Recent Imaging:  Procedure: IR biopsy other    Result Date: 9/13/2024  Narrative: PROCEDURE: 1.  Ultrasound-guided right gluteal mass biopsy 2.  Fluoroscopic-guided diagnostic lumbar puncture STAFF: Greg Birch M.D. NUMBER OF IMAGES: Multiple. COMPLICATIONS: None. MEDICATIONS: Moderate sedation was monitored by radiology nursing staff.  Monitoring of conscious sedation totaled 15 minutes. INDICATION: Metastatic lung cancer. New right gluteal  mass. 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 PROCEDURE: Under real-time ultrasound guidance, a 17-gauge coaxial needle was advanced into the right gluteal mass. Under ultrasound guidance, a total of 3 2.3 cm. core biopsies were obtained. The tract with embolized with Gelfoam, and the coaxial needle was removed. Next, a suitable location for puncture was identified with fluoroscopy in the prone position at the L2-3 level. A 20-gauge spinal needle was advanced into the subarachnoid space using fluoroscopic guidance.  An opening pressure was measured.  16 mL's of clear cerebrospinal fluid was then removed and sent for analysis.  The needle was removed and the puncture site was covered with a sterile dressing. FINDINGS: 1.  Pre-procedural ultrasound showed the approximately 3 cm superficial right gluteal mass. 2.  Intra-procedural ultrasound confirmed good positioning of the biopsy needle within the mass. 3.  Samples were sent in formalin. 4.  Post-procedural ultrasound showed no immediate complication. 5.  Fluoroscopy confirmed good positioning within the subarachnoid space, with prompt return of cerebrospinal fluid. 6.  Opening pressure was measured at 15 cm H20.     Impression: 1.  Ultrasound-guided right gluteal mass biopsy. 2.  Fluoroscopic-guided lumbar puncture. Workstation performed: [id number]     Procedure: IR lumbar puncture    Result Date: 9/13/2024  Narrative: PROCEDURE: 1.  Ultrasound-guided right gluteal mass biopsy 2.  Fluoroscopic-guided diagnostic lumbar puncture STAFF: Greg Birch M.D. NUMBER OF IMAGES: Multiple. COMPLICATIONS: None. MEDICATIONS: Moderate sedation was monitored by radiology nursing staff.  Monitoring of conscious sedation totaled 15 minutes. INDICATION: Metastatic lung cancer. New right gluteal mass. 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 PROCEDURE: Under real-time ultrasound guidance, a 17-gauge coaxial needle was advanced into the right gluteal mass. Under ultrasound  guidance, a total of 3 2.3 cm. core biopsies were obtained. The tract with embolized with Gelfoam, and the coaxial needle was removed. Next, a suitable location for puncture was identified with fluoroscopy in the prone position at the L2-3 level. A 20-gauge spinal needle was advanced into the subarachnoid space using fluoroscopic guidance.  An opening pressure was measured.  16 mL's of clear cerebrospinal fluid was then removed and sent for analysis.  The needle was removed and the puncture site was covered with a sterile dressing. FINDINGS: 1.  Pre-procedural ultrasound showed the approximately 3 cm superficial right gluteal mass. 2.  Intra-procedural ultrasound confirmed good positioning of the biopsy needle within the mass. 3.  Samples were sent in formalin. 4.  Post-procedural ultrasound showed no immediate complication. 5.  Fluoroscopy confirmed good positioning within the subarachnoid space, with prompt return of cerebrospinal fluid. 6.  Opening pressure was measured at 15 cm H20.     Impression: 1.  Ultrasound-guided right gluteal mass biopsy. 2.  Fluoroscopic-guided lumbar puncture. Workstation performed: [id number]     Procedure: MRI Brain BT w wo Contrast    Result Date: 9/11/2024  Narrative: MRI BRAIN WITH AND WITHOUT CONTRAST INDICATION: mets. COMPARISON: CTA head and neck with and without contrast 9/9/2024. CT head without contrast 8/21/2024. MRI brain with and without contrast 7/30/2024, 3/28/2024. MRI brain without contrast 2/10/2015. TECHNIQUE: Multiplanar, multisequence imaging of the brain and sella was performed before and after gadolinium administration. IV Contrast:  10 mL of Gadobutrol injection (SINGLE-DOSE) IMAGE QUALITY:   Diagnostic. FINDINGS: BRAIN PARENCHYMA: Multiple enhancing metastatic lesions. For reference (measured on long axis): - Tiny left anterolateral frontal lobe lesion (13:14), previously 0.3 cm. - 0.6 cm left paramedian parietal lobe lesion (13:97), unchanged. - 0.4 cm left  paramedian frontal lobe lesion (13:93), previously 0.6 cm ring-enhancing lesion. - 2.2 cm left occipital lobe lesion (13:74), unchanged. - 0.3 cm left temporal lobe lesion (13:53), new. - 0.4 cm cerebellar vermis ring-enhancing lesion (13:39), previously 0.5 cm. Similar nearly symmetric enhancement in bilateral cranial nerve VII in both internal auditory canals, suspicious for leptomeningeal metastasis. Persistent diffuse vasogenic edema in left parietal, left occipital, and left posterior temporal lobes with mild mass effect on posterior aspect of left lateral ventricle, similar to recent CT heads but worsened since MRI brain 7/30/2024. No midline shift. No acute intracranial hemorrhage. No diffusion weighted signal abnormality to suggest acute infarction. No abnormal hyperperfusion. Small scattered hyperintensities on T2/FLAIR imaging are noted in the periventricular and subcortical white matter demonstrating an appearance that is statistically most likely to represent mild microangiopathic change. VENTRICLES: Mild mass effect on posterior aspect of the left lateral ventricle. No obstructive hydrocephalus. No acute intraventricular hemorrhage. SELLA AND PITUITARY GLAND:  Normal. ORBITS:  Normal. PARANASAL SINUSES:  Normal. VASCULATURE:  Evaluation of the major intracranial vasculature demonstrates appropriate flow voids. CALVARIUM AND SKULL BASE:  Normal. EXTRACRANIAL SOFT TISSUES:  Normal.     Impression: Mixed treatment response. - New tiny left temporal lobe metastatic lesion. - Unchanged left paramedian parietal lobe and left occipital lobe metastatic lesions - largest in left occipital lobe. - Unchanged nearly symmetric enhancement along the course of bilateral cranial nerve VII in both internal auditory canals, suspicious for leptomeningeal metastasis. Differential includes bilateral Bell's palsy. - Slightly decreased size of left anterolateral frontal lobe, left paramedian frontal lobe, and cerebellar  vermis metastatic lesions. - Persistent diffuse vasogenic edema in left parietal, left occipital, and left posterior temporal lobes with mild mass effect on posterior aspect of left lateral ventricle, similar to recent CT heads but worsened since MRI brain 7/30/2024. The study was marked in EPIC for immediate notification. Workstation performed: [id number]     Procedure: CTA head and neck with and without contrast    Result Date: 9/9/2024  Narrative: CTA NECK AND BRAIN WITH AND WITHOUT CONTRAST INDICATION: Known brain mets, confusion/disorientation over the past 2 days, known visual field deficits in right inferior visual field, RUE finger to nose abnormal COMPARISON:   CT brain August 21, 2024. Prior CT angiogram July 29, 2024. TECHNIQUE:  Routine CT imaging of the Brain without contrast.Post contrast imaging was performed after administration of iodinated contrast through the neck and brain. Post contrast axial 0.625 mm images timed to opacify the arterial system.  3D rendering was performed on an independent workstation.   MIP reconstructions performed. Coronal and sagittal reconstructions were performed of the non contrast portion of the brain. Radiation dose length product (DLP) for this visit:  2108 mGy-cm .  This examination, like all CT scans performed in the [facility], was performed utilizing techniques to minimize radiation dose exposure, including the use of iterative reconstruction and automated exposure control. IV Contrast:  85 mL of iohexol (OMNIPAQUE) IMAGE QUALITY:   Diagnostic FINDINGS: NONCONTRAST BRAIN PARENCHYMA: Vasogenic edema identified in the left parietal and occipital lobes similar to the prior study. This is secondary to known metastatic disease. Mass effect on the posterior horn of left lateral ventricle noted. Low-density in periventricular white matter compatible with mild chronic microangiopathic change. VENTRICLES AND EXTRA-AXIAL SPACES: As above. No hydrocephalus.  VISUALIZED ORBITS: Normal. PARANASAL SINUSES: Normal. CTA NECK ARCH AND GREAT VESSELS: Visualized arch and great vessels are normal. VERTEBRAL ARTERIES: Patent extracranial segments. RIGHT CAROTID: No stenosis.    No dissection. LEFT CAROTID: No stenosis.    No dissection. NASCET criteria was used to determine the degree of internal carotid artery diameter stenosis. CTA BRAIN: INTERNAL CAROTID ARTERIES: No stenosis or occlusion. ANTERIOR CEREBRAL ARTERY CIRCULATION:  No stenosis or occlusion. MIDDLE CEREBRAL ARTERY CIRCULATION:  No stenosis or occlusion. DISTAL VERTEBRAL ARTERIES:  No stenosis or occlusion. BASILAR ARTERY:  No stenosis or occlusion. POSTERIOR CEREBRAL ARTERIES: No stenosis or occlusion. VENOUS STRUCTURES:  Normal. NON VASCULAR ANATOMY BONY STRUCTURES:  No acute osseous abnormality. SOFT TISSUES OF THE NECK:  Normal. THORACIC INLET: Right upper lobe lung mass known to represent bronchogenic carcinoma.     Impression: CT Brain: Vasogenic edema left parietal and left occipital lobes similar to prior study in attributed to history of brain metastasis. Mass effect on the posterior aspect of the left lateral ventricle unchanged. CT Angiography:  Unremarkable CTA neck and brain. Workstation performed: [id number]     Procedure: MRI pelvis bony wo and w contrast    Result Date: 9/3/2024  Narrative: MRI BONY PELVIS WITH AND WITHOUT CONTRAST INDICATION:   C34.91: Malignant neoplasm of unspecified part of right bronchus or lung. FDG avid lesion in the right ischio anal fat seen on prior PET/CT. Follow-up for further characterization. Patient has history of non-small cell lung carcinoma, metastatic to mediastinal and hilar lymph nodes. COMPARISON: PET/CT dated August 5, 2024, CT chest abdomen pelvis dated July 3, 2024. TECHNIQUE:  Multiplanar/multisequence MR was obtained of the entire pelvis both pre and post IV contrast. IV Contrast:  4 mL of Gadobutrol injection (SINGLE-DOSE) FINDINGS: SUBCUTANEOUS  TISSUES: Normal PELVIC SOFT TISSUES: Soft tissue mass in the right ischioanal fat measuring 3.8 x 2.6 x 3.4 cm (5/33, 2/11), corresponding to the FDG avid lesion seen on prior PET/CT. It has hyperintense T2 weighted and hypointense T1-weighted signal with thick nodular and irregular enhancement predominantly along its periphery with hypoenhancement at the center. Lesion size and has increased in size from prior PET CT dated 8/5/2024 at which time it measured 2.7 x 2.0 cm and CT study dated 7/3/2024 at which time it measured 0.9 x 0.7 cm. Overall findings are suspicious for a malignant lesion, likely metastasis from patient's cancer. BONES:  No fracture, dislocation or destructive osseous lesion. ARTICULAR SURFACES:  Normal. VISUALIZED MUSCULATURE:  Unremarkable.     Impression: Enhancing soft tissue lesion in the right ischioanal fat measuring 3.8 x 2.6 x 3.4 cm (FDG avid on prior PET/CT), increased in size from 8/5/2024 and 7/3/2024. Findings are suspicious for a malignant lesion, likely a metastasis from patient's lung cancer. Consider definitive characterization with tissue sampling. Workstation performed: [id number]     Procedure: XR elbow 3+ vw left    Result Date: 8/27/2024  Narrative: [id number]    Procedure: XR ankle 3+ vw right    Result Date: 8/27/2024  Narrative: [id number]    Procedure: CT head without contrast    Result Date: 8/21/2024  Narrative: CT BRAIN - WITHOUT CONTRAST INDICATION:   History of lung cancer with metastatic brain lesions, dysmetria. COMPARISON: Head CT from 8/5/2024, prior brain MR from 7/30/2024, and prior head CT from 7/29/2024 TECHNIQUE:  CT examination of the brain was performed.  Multiplanar 2D reformatted images were created from the source data. Radiation dose length product (DLP) for this visit:  1003 mGy-cm .  This examination, like all CT scans performed in the [facility], was  performed utilizing techniques to minimize radiation dose exposure, including the use of iterative reconstruction and automated exposure control. IMAGE QUALITY:  Diagnostic. FINDINGS: PARENCHYMA: There is worsening vasogenic edema involving the left parietal, and occipital lobes, related to the left occipital enhancing metastatic lesion better depicted on prior brain MRI. There is also worsening low attenuating vasogenic edema involving the subcortical parafalcine left frontal lobe, corresponding to the metastatic lesion in this region identified on prior brain MRI. No associated intraparenchymal hemorrhage is noted. No midline shift is noted. There are additional periventricular white matter low-attenuation areas involving both cerebral hemispheres, which statistically like represent mild chronic microangiopathic changes. VENTRICLES AND EXTRA-AXIAL SPACES: There is mass effect on the posterior body and trigone of the left lateral ventricle due to surrounding vasogenic edema. VISUALIZED ORBITS: Normal visualized orbits. PARANASAL SINUSES: Normal visualized paranasal sinuses. CALVARIUM AND EXTRACRANIAL SOFT TISSUES:  Normal.     Impression: 1. Worsening vasogenic edema in particular involving the left parietal and occipital lobes, corresponding to worsening vasogenic edema surrounding the patient's known left occipital lobe metastasis as better depicted on prior brain MRI. This results in mass effect on the posterior body and trigone of left lateral ventricle.. 2. Worsening vasogenic edema involving the parafalcine left frontal lobe at the site of the patient's known intracranial metastasis in this region as seen on prior brain MRI. 3. No intracranial hemorrhage noted. No midline shift. The study was marked in EPIC for immediate notification. Workstation performed: [id number]     Procedure: NM PET CT skull base to mid thigh    Result Date: 8/5/2024  Narrative: PET/CT SCAN INDICATION: C34.91: Malignant neoplasm of  unspecified part of right bronchus or lung C76.1: Malignant neoplasm of thorax, restaging MODIFIER: PS COMPARISON: PET/CT dated 3/22/2024, brain MRI dated 7/30/2024, CT of chest, abdomen, and pelvis dated 7/3/2024 CELL TYPE:  metastatic non-small cell carcinoma (bx: 3/26/24 4R, 10R, 4L LNs +) TECHNIQUE:   8.7 mCi F-18-FDG administered IV. Multiplanar attenuation corrected and non attenuation corrected PET images are available for interpretation, and contiguous, low dose, axial CT sections were obtained from the skull vertex through the femurs. Intravenous contrast material was not utilized. This examination, like all CT scans performed in the [facility], was performed utilizing techniques to minimize radiation dose exposure, including the use of iterative reconstruction and automated exposure control. Fasting serum glucose: 78 mg/dl FINDINGS: VISUALIZED BRAIN: Focal FDG activity involving the left occipital lobe on PET image 53 with max SUV of 11.0 corresponding to patient's known metastatic lesion which was better characterized on MRI dated 7/30/2024; this finding is associated with adjacent regional photopenia which could be related to underlying vasogenic edema seen on MRI. Patient's known additional metastatic lesions seen on recent MRI are not definitively visualized on current PET/CT and could be obscured by normal background physiologic brain activity. HEAD/NECK: There is a physiologic distribution of FDG. No FDG avid cervical adenopathy is seen. CT images: Intracranial atherosclerotic calcification is noted. CHEST: Persistent hypermetabolic medial right upper lobe lung mass measuring 3.3 x 2.7 cm on image 132 of series 3 with max SUV of 10.0, previously measuring 3.7 x 2.9 cm with max SUV of 18.0 when utilizing similar measurement technique. On image 132 of series 3 there is a stable adjacent 7 mm right upper lobe lung nodule with max SUV of 2.1, previously similar in size with max SUV of  1.7 Improving hypermetabolic right paratracheal mediastinal and right hilar yaw metastatic disease. For example, on image 137, hypermetabolic right paratracheal lymph node measures 7 mm in short axis with max SUV of 3.7, previously approximately 1.3 cm with max SUV of 13.0. Stable mildly FDG avid semisolid groundglass nodular opacity involving the left upper lobe measuring 2.6 x 1.1 cm on image 142 of series 3 with max SUV of 1.9, previously similar in size with max SUV of 2.2. CT images: Atherosclerotic vascular calcifications including those of the coronary arteries are noted. Emphysematous changes. ABDOMEN: No FDG avid soft tissue lesions are seen. CT images: Atherosclerotic vascular calcifications are noted. Tiny nonobstructing right renal calculus. PELVIS: On image 272 series 3 there is a new hypermetabolic soft tissue mass involving the fat in the right anorectal region medial to the right ischial tuberosity measuring 2.7 x 2.0 cm with max SUV of 9.9; this finding seems to be in an atypical distribution from patient's primary bronchogenic carcinoma and could reflect a separate malignant process. Further evaluation with tissue sampling is recommended for further characterization as clinically indicated. This finding has significantly increased in size since 7/3/2024 where it measured 1.0 cm. CT images: Unremarkable. OSSEOUS STRUCTURES: No FDG avid lesions are seen. CT images: Possible osteopenia. Degenerative changes are noted involving the spine.     Impression: 1. New hypermetabolic soft tissue mass involving the right anorectal fat medial to the right ischial tuberosity. While this finding could reflect hypermetabolic metastatic disease related to patient's primary bronchogenic carcinoma the distribution is atypical for metastatic disease from bronchogenic carcinoma. Therefore, consider further evaluation with tissue sampling to exclude secondary hypermetabolic malignancy. 2. Persistent hypermetabolic  right upper lobe bronchogenic carcinoma with hypermetabolic mediastinal and right hilar yaw metastatic disease which overall is improved since prior PET/CT. 3. Hypermetabolic left occipital lobe metastatic disease. Patient's known intracranial metastatic disease was better characterized on prior MRI of the brain. 4. Stable mildly FDG avid left upper lobe groundglass nodule suspicious for malignancy of low metabolic activity such as bronchoalveolar carcinoma. Please see above for details and additional findings. The study was marked in EPIC for significant notification. Workstation performed: [id number]     Procedure: EEG awake or drowsy routine    Result Date: 2024  Narrative: Table formatting from the original result was not included. ELECTROENCEPHALOGRAM (EEG) Patient Name:  Ayla Nye  MRN: [id number] :  1950 File #: [id number] Age: 73 y.o.  Date performed: 24        Report date: 2024      Study type: Routine EEG ICD 10 diagnosis: Transient neurological symptoms R29.818 Start time: 8:48 End time: 9:21 --------------------------------------------------------------------------- ---------------------------------------- Patient History: This recording was observed in a 73 y.o. female w/ metastatic NSCLC and multifocal brain metastases (including lefto frontal and occipital lobe) to determine whether spells of focal right motor and right hemianopia are seizures. Medications include: levetiracetam, dexamethasone --------------------------------------------------------------------------- ---------------------------------------- Description of Procedure: 32 channel digital recording with electrodes placed according to the International 10-20 system with additional T1/T2 electrodes, EOG, EKG, and simultaneous video. The recording was technically satisfactory. --------------------------------------------------------------------------- ---------------------------------------- EEG Description:  The recording was performed with the patient awake and drowsy. She was fully oriented. During wakefulness, there were long runs of well regulated, low amplitude, posteriorly dominant, asymmetric, 9.5-10.5 cps alpha rhythm that attenuated with eye opening which was better defined, higher amplitude and slightly faster on the right. There were symmetric low amplitude, frontally dominant beta activities. There were intermittent, very brief (1-2 seconds), left occipitally predominant, low to medium amplitude mixed frequency, polymorphic 5.5-8 cps theta and 2-3 cps delta activities. With drowsiness, alpha activity attenuated and was replaced by diffusely distributed theta activities. Deeper sleep was not captured. --------------------------------------------------------------------------- ---------------------------------------- Activation Procedures: Hyperventilation was not performed. Stepped photic stimulation between 1-30 cps was performed and induced bilateral photic driving. Other findings: The single lead ECG demonstrated regular rhythm --------------------------------------------------------------------------- ---------------------------------------- EEG Interpretation: This Routine EEG recorded during wakefulness and drowsiness is abnormal. Intermittent, left occipital predominant, mixed theta and delta slowing suggests left occipital focal neuronal dysfunction. Yulissa Eddy MD Clinical Neurophysiology Fellow [facility] Loco Crooks MD Attending Epileptologist [facility]    Procedure: Echo complete w/ contrast if indicated    Result Date: 7/30/2024  Narrative:   Left Ventricle: Left ventricular cavity size is normal. Wall thickness is normal. The left ventricular ejection fraction is 60%. Systolic function is normal. Wall motion is normal. Diastolic function is normal.   Right Ventricle: Right ventricular cavity size is  mildly dilated. Systolic function is normal.   Atrial Septum: There is a patent foramen ovale.  Bidirectional shunting with bubbles visualized in the LV.   Mitral Valve: There is mild annular calcification.   Tricuspid Valve: There is mild regurgitation. The estimated right ventricular systolic pressure is 29.00 mmHg.     Procedure: MRI brain w wo contrast    Result Date: 7/30/2024  Narrative: MRI BRAIN WITH AND WITHOUT CONTRAST INDICATION: Dizziness. COMPARISON: CT head 7/29/2024. TECHNIQUE: Multiplanar, multisequence imaging of the brain was performed before and after gadolinium administration. IV Contrast:  5 mL of Gadobutrol injection (SINGLE-DOSE) IMAGE QUALITY:   Diagnostic. FINDINGS: BRAIN PARENCHYMA: No acute infarct or midline shift. Multiple enhancing lesions are seen supratentorially and infratentorially, with associated vasogenic edema. Lesions as referenced below: 1. 2.0 x 1.4 cm lesion in the left occipital lobe on series 10, image 16 with adjacent vasogenic edema involving the left occipital lobe and extending into the left periatrial white matter, where there is mild mass effect on the adjacent lateral ventricle. There is an internal, diffusion restricting 8 mm component. 2. 4 mm enhancing lesion in the left paramedian frontal lobe on series 10, image 20 with mild adjacent mass effect and vasogenic edema and a small focus of susceptibility likely reflective of hemorrhage. 3. 3 mm lesion in the left paramedian parietal lobe on series 10, image 20 with mild mass effect and vasogenic edema. 4. 3 mm lesion in the midline cerebellum on series 10, image 9 with adjacent vasogenic edema which results in mild mass effect on the fourth ventricle. 5. 2 mm lesion in the left frontal lobe (series 11, image 129) VENTRICLES:  Normal for the patient's age. SELLA AND PITUITARY GLAND:  Normal. ORBITS: No acute abnormality. PARANASAL SINUSES: Essentially clear. VASCULATURE:  Evaluation of the major intracranial  vasculature demonstrates appropriate flow voids. CALVARIUM AND SKULL BASE: No acute abnormality. EXTRACRANIAL SOFT TISSUES: No acute abnormality.     Impression: 1. Multiple supratentorial and infratentorial enhancing lesions with associated vasogenic edema, the largest of which is in the left occipital lobe. Findings are most consistent with metastases. 2. No acute infarct or midline shift. The study was marked in EPIC for immediate notification. Workstation performed: [id number]     Procedure: CT stroke alert brain    Result Date: 7/29/2024  Narrative: CT BRAIN - STROKE ALERT PROTOCOL INDICATION:   Stroke Alert. COMPARISON: 3/28/2024. TECHNIQUE:  CT examination of the brain was performed.  In addition to axial images, coronal reformatted images were created and submitted for interpretation. Radiation dose length product (DLP) for this visit:  912 mGy-cm .  This examination, like all CT scans performed in the [facility], was performed utilizing techniques to minimize radiation dose exposure, including the use of iterative reconstruction and automated exposure control. IMAGE QUALITY:  Diagnostic. FINDINGS: PARENCHYMA: Area of hypoattenuation in the left superior frontal region adjacent to the interhemispheric fissure with preservation of the cortex, possibly indicating vasogenic edema.. Periventricular and subcortical hypoattenuating foci consistent with microangiopathic disease. No acute intracranial hemorrhage or mass effect. VENTRICLES AND EXTRA-AXIAL SPACES: No hydrocephalus or extra-axial collection. VISUALIZED ORBITS: Intact. PARANASAL SINUSES: Clear. CALVARIUM AND EXTRACRANIAL SOFT TISSUES:   No lytic or blastic lesion or fracture.     Impression: 1. Small area of hypoattenuation in the left superior frontal lobe and larger area in the left posterior parietal region, suggestive of vasogenic edema. Findings are concerning for occult underlying metastases. Acute infarcts cannot be excluded  however infarcts in 2 vascular territories and with findings suggesting subacute infarcts, not agreement with the clinical timeline suggest stroke is less likely. 2. No intracranial hemorrhage. Findings were directly discussed with Percy Montalvo  at    8:20 PM . Workstation performed: [id number]     Procedure: CTA stroke alert (head/neck)    Result Date: 7/29/2024  Narrative: CTA NECK AND BRAIN WITH AND WITHOUT CONTRAST INDICATION: Stroke Alert COMPARISON: 3/28/2024 TECHNIQUE:  Post contrast imaging was performed after administration of iodinated contrast through the neck and brain. Post contrast axial 0.625 mm images timed to opacify the arterial system.  3D rendering was performed on an independent workstation.   MIP reconstructions performed. Coronal and sagittal reconstructions were performed of the non contrast portion of the brain. Radiation dose length product (DLP) for this visit:  338 mGy-cm .  This examination, like all CT scans performed in the [facility], was performed utilizing techniques to minimize radiation dose exposure, including the use of iterative reconstruction and automated exposure control. IV Contrast:  85 mL of iohexol (OMNIPAQUE) IMAGE QUALITY:   Diagnostic FINDINGS: CTA NECK ARCH AND GREAT VESSELS: Mild atherosclerotic plaque in the aortic arch. No stenosis in the subclavian arteries. VERTEBRAL ARTERIES: Left vertebral artery arises directly from the aortic arch. No stenosis. RIGHT CAROTID: No stenosis.    No dissection. LEFT CAROTID: No stenosis.    No dissection. NASCET criteria was used to determine the degree of internal carotid artery diameter stenosis. CTA BRAIN: INTERNAL CAROTID ARTERIES: Minimal calcified plaque in the carotid siphons without hemodynamically significant stenosis. ANTERIOR CEREBRAL ARTERY CIRCULATION:  No stenosis or occlusion. MIDDLE CEREBRAL ARTERY CIRCULATION:  No stenosis or occlusion. DISTAL VERTEBRAL ARTERIES:  No stenosis or occlusion. BASILAR  ARTERY:  No stenosis or occlusion. POSTERIOR CEREBRAL ARTERIES: No stenosis or occlusion. VENOUS STRUCTURES: Patent dural venous sinuses. NON VASCULAR ANATOMY BONY STRUCTURES:  No acute osseous abnormality. SOFT TISSUES OF THE NECK: Subcentimeter cystic lesions in the thyroid. THORACIC INLET: Partially visualized mediastinal lymphadenopathy, stable. Partially visualized right upper lobe mass, not significantly changed.     Impression: No hemodynamically significant stenosis, dissection or occlusion of the carotid or vertebral arteries or major vessels of the [place].. Findings were directly discussed with Percy Montalvo  at  8:29 PM  . Workstation performed: [id number]     Procedure: XR chest 2 views    Result Date: 7/3/2024  Narrative: XR CHEST PA & LATERAL DUAL ENERGY SUBTRACTION. INDICATION:  fever, hx of lung ca. COMPARISON: Chest CT 7/3/2024, CXR 6/22/2024, PET CT 3/22/2024. FINDINGS: No acute disease. Redemonstration of right upper lobe paramediastinal mass. No pneumothorax or pleural effusion. Unremarkable cardiomediastinal silhouette. Bones are unremarkable for age. Unremarkable upper abdomen.     Impression: No acute cardiopulmonary disease. Redemonstration of right upper lobe paramediastinal mass. Workstation performed: [id number]     Procedure: CT chest abdomen pelvis w contrast    Result Date: 7/3/2024  Narrative: CT CHEST, ABDOMEN AND PELVIS WITH IV CONTRAST INDICATION: hx of lung cancer, fever, cough. Fever (102.9 degrees Fahrenheit), cough, recently had chemotherapy. COMPARISON: February 28, 2024 TECHNIQUE: CT examination of the chest, abdomen and pelvis was performed. Multiplanar 2D reformatted images were created from the source data. This examination, like all CT scans performed in the [facility], was performed utilizing techniques to minimize radiation dose exposure, including the use of iterative reconstruction and automated exposure control. Radiation dose length product (DLP)  for this visit: 314 mGy-cm IV Contrast: 100 mL of iohexol (OMNIPAQUE) Enteric Contrast: Not administered. FINDINGS: CHEST LUNGS: There is an approximately 4.6 x 4.8 x 3.3 cm mass within the anteromedial aspect of the right upper lobe of the lung. This mass abuts the anterior and anteromedial pleural surface and the right hand aspect of the upper mediastinum. The mass demonstrates peripheral spiculation and areas of low density centrally, suggesting areas of necrosis. Just posterolateral to this mass there is a spiculated satellite lesion measuring approximately 1.6 cm. There is also a spiculated satellite lesion anterior and inferior to the dominant mass, measuring approximately 1.4 cm and abutting the anterior pleural surface (series 302 image 91); this satellite mass appears new compared with February 28, 2024. An ill-defined groundglass opacity in the left upper lobe of the lung measures approximately 2.5 cm, similar to the prior study. There is mild to moderate emphysema. There is mild bibasilar scarring versus atelectasis. Right apical scarring unchanged. PLEURA: Left pleural calcifications unchanged. No pleural effusions. No pneumothorax. HEART/GREAT VESSELS: Coronary artery disease. There is atherosclerosis of the thoracic aorta. No thoracic aortic aneurysm. MEDIASTINUM AND SUDEEP: There is a 2.4 x 2.2 cm right paratracheal node demonstrating low density centrally suggesting necrosis; this is larger compared with February 28, 2024. Additionally, there is a 2.8 x 2 cm precarinal node demonstrating low density centrally suggesting necrosis, also larger compared to the prior study. Necrotic appearing right hilar adenopathy measuring up to 2 cm. CHEST WALL AND LOWER NECK: Tiny thyroid nodules, measuring 6 mm or less. Incidental discovery of one or more thyroid nodule(s) measuring less than 1.5 cm and without suspicious features is noted in this patient who is above 35 years old; according to guidelines published in  the February 2015 white paper on incidental thyroid nodules in the Journal of the American College of Radiology (JACR), no further evaluation is recommended. ABDOMEN LIVER/BILIARY TREE: The liver is mildly enlarged, measuring approximately 19 cm craniocaudal dimension. There is mild hepatic steatosis. GALLBLADDER: No calcified gallstones. No pericholecystic inflammatory change. SPLEEN: Unremarkable. PANCREAS: Unremarkable. ADRENAL GLANDS: Unremarkable. KIDNEYS/URETERS: Small cysts and subcentimeter hypodensities too small to characterize. There is a 2 mm nonobstructing calculus in the upper pole right kidney. No hydronephrosis bilaterally. STOMACH AND BOWEL: No bowel obstruction. There is mild colonic diverticulosis without evidence of acute diverticulitis. APPENDIX: No findings to suggest appendicitis; reportedly, the patient has undergone prior appendectomy. ABDOMINOPELVIC CAVITY: No ascites. No pneumoperitoneum. No lymphadenopathy. VESSELS: Atherosclerosis. No abdominal aortic aneurysm. PELVIS REPRODUCTIVE ORGANS: Unremarkable for patient's age. URINARY BLADDER: Unremarkable. ABDOMINAL WALL/INGUINAL REGIONS: Unremarkable. BONES: No acute fracture or suspicious osseous lesion. Spinal degenerative changes.     Impression: There has been interval progression of neoplastic disease, as described above. Please see discussion. Mild hepatomegaly. Mild hepatic steatosis. Mild colonic diverticulosis without evidence of acute diverticulitis. No bowel obstruction. Atherosclerosis. Coronary artery disease. Other nonemergent findings as above. Workstation performed: [id number]     Procedure: XR chest portable    Result Date: 6/22/2024  Narrative: XR CHEST PORTABLE INDICATION: fever. COMPARISON: PET/CT 3/22/2024, chest CT 2/28/2024, CXR 7/16/2019. FINDINGS: Redemonstration of 4 cm right upper lobe mass with no acute disease. No pneumothorax or pleural effusion. Normal cardiomediastinal silhouette. Bones are unremarkable for age.  "Normal upper abdomen.     Impression: No acute cardiopulmonary disease. Redemonstration of 4 cm right upper lobe mass. Workstation performed: [id number]      60 minutes total time spent on 9/18/2024 in caring for this patient including obtaining/reviewing history, symptom assessment and management, medication review / adjustment, psychosocial support, reviewing / ordering tests, medicines, imaging, procedures, supportive listening, anticipatory guidance, patient/family education, risks/benefits of treatment(s), risk factor reduction, and documenting in the medical record. All of the patient's questions were answered during this discussion.    ZULEYMA Pollard  [facility]  [phone]    Portions of this document may have been created using dictation software and as such some \"sound alike\" terms may have been generated by the system. Do not hesitate to contact me with any questions or clarifications.     "

## 2024-09-19 ENCOUNTER — HOSPITAL ENCOUNTER (OUTPATIENT)
Dept: INFUSION CENTER | Facility: HOSPITAL | Age: 74
Discharge: HOME | End: 2024-09-19
Attending: INTERNAL MEDICINE

## 2024-09-20 PROBLEM — Z51.5 PALLIATIVE CARE PATIENT: Status: ACTIVE | Noted: 2024-09-20

## 2024-09-20 NOTE — ASSESSMENT & PLAN NOTE
Current regimen:  Celebrex 200mg BID  Failed therapies:  Oxycodone- felt like she had a headache from the medication  Bowel regimen to prevent OIC:  Miralax, colac  I have reviewed the patient's controlled substance dispensing history in the Prescription Drug Monitoring Program in compliance with the [facility] regulations before prescribing any controlled substance

## 2024-09-20 NOTE — ASSESSMENT & PLAN NOTE
Ayla follows with palliative care for psychosocial support and symptom management of lung cancer with brain mets, perianal tumor   Symptoms - pain   ACP -  on file   RTC - 2 weeks    Lives alone.   Has two daughter .   Ex- is supportive and involved in care  Has brother that lives close by that helps as well.     No longer drives.

## 2024-09-20 NOTE — ASSESSMENT & PLAN NOTE
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   Follows with Dr. Castro outpatient.  Treated with chemoradiotherapy therapy.

## 2024-09-22 NOTE — PROGRESS NOTES
HEMATOLOGY / ONCOLOGY CLINIC FOLLOW UP NOTE    Patient Ayla Nye  MRN: [id number]  : 1950  Date of Encounter 2024         Reason for Encounter: hospital follow up, pathology follow up for pelvic mass biopsy     C1 2024  C2  2024  C3 2024  C4 2024  C5 2024  C6 2024 at [place] f/b hospitalization for neutropenic fever     First line metastatic treatment     Carboplatin AUC 5 Pemetrexed 500 mg/m2 and Pembrolizumab 200 mg IV every 3 weeks x 4 cycles     C1 held due to hospital admission  C1 held due to radiation and concurrent high-dose steroid use    Oncology History Overview Note   Returns for complex follow-up to discuss palliative RT to soft tissue perianal mass, SCC.     Patient has history of [id number] (IIIB) NSCLC of the right upper lobe, s/p concurrent chemoRT completing on 24. She completed a course of SRS on 24 after MRI brain demonstrated five supra- and infratentorial enhancing lesions compatible with metastases. She tolerated this treatment well. Last telemedicine follow-up visit on 8/15/24.   She had two hospital admission since her last follow-up for cerebral edema and pain related to soft tissue mass in R ischioanal fossa. She underwent IR biopsy of the mass on 24 revealing squamous cell carcinoma. She returns today to discuss palliative radiation to this site.   She is planned to initiate palliative systemic therapy, but this has been delayed due to multiple admissions and continued steroids.        - 24 Hospital admission  Presented to the ED on  with acute encephalopathy secondary to the metastatic lesion in the brain from lung cancer primary.  In the ED, labs/vitals were unremarkable but CT head was evident for worsening vasogenic edema particularly involving the left parietal occipital lobe surrounding patient's known metastatic lesion.  Vasogenic edema was also involved in the parafalcine left frontal lobe.Radiation  Oncologist consulted and recommended a slower taper of dexamethasone. In the ED, Keppra was increased to 750 mg BID as per neurology recommendations at last admission.  Patient was discharged home with Decadron taper, Protonix, and Keppra 750 mg twice daily.  Patient will need to follow-up with her oncologist, PCP, and neurology outpatient.     8/21/24 CT head wo contrast  1. Worsening vasogenic edema in particular involving the left parietal and occipital lobes, corresponding to worsening vasogenic edema surrounding the patient's known left occipital lobe metastasis as better depicted on prior brain MRI. This results in mass effect on the posterior body and trigone of left lateral ventricle.  2. Worsening vasogenic edema involving the parafalcine left frontal lobe at the site of the patient's known intracranial metastasis in this region as seen on prior brain MRI.  3. No intracranial hemorrhage noted. No midline shift.      8/30/24  MRI pelvis bony   Enhancing soft tissue lesion in the right ischioanal fat measuring 3.8 x 2.6 x 3.4 cm (FDG avid on prior PET/CT), increased in size from 8/5/2024 and 7/3/2024. Findings are suspicious for a malignant lesion, likely a metastasis from patient's lung cancer. Consider definitive characterization with tissue sampling.      9/3/24 Dr. Castro  Had modest response to treatment per PET in terms of the lung   issue clearly are the new brain lesions 2.0 x.4 left occiput as well as multiple other smaller lesions    In addition PET with new SUV 9.9 mass in the right anorectal fat/right ischial tuberosity 2.7x2 which is painful to patient and feels is growing   Will get MRI pelvis; will probably need biopsy.  Less likely abscess and more likely disease but unusual place for a NSCLC to metastasize to   Will start Carboplatin/Pemetrexed/Pembrolizumab   Follow-up in 2 weeks       9/9 - 9/12/24 Hospital admission  presented to the hospital on 9/9/2024 due to confusion and  disorientation for two days before ED presentation.    repeat CT head showed essentially stable left parieto-occipital vasogenic edema compared to the prior admission.   Pt started on higher dosage of 4 mg dexamethasone and noted fairly quick improvement in her confusion and disorientation. Medical oncology and radiation oncology were consulted for confusion and for pt's right hip pain related to suspected metastatic soft tissue mass in right ischioanal fat. MRI brain was obtained showing increased vasogenic edema, new brain mets and nearly symmetric enhancement along the course of bilateral cranial nerve VII in both internal auditory canals, suspicious for leptomeningeal metastasis. Lumbar puncture was performed and results are reassuring for ruling out meningitis and ICH. Right hip biopsy took place during hospitalization and palliative care was consulted. Pt to also follow up with palliative care outpatient.     Plan:  Continue dexamethasone 4 mg Q12 hours for the rest of this week. Then taper to 4 mg in a.m. and 2 mg in p.m. for all of next week. The following week, taper to 2 mg in the a.m. and 2 mg in p.m. for that week. Finally, taper to 2 mg daily until repeat MRI in October.  Follow up with oncology and radiation oncology outpatient.  Follow up with neurology outpatient.      9/9/24 CTA head and neck  CT Brain: Vasogenic edema left parietal and left occipital lobes similar to prior study in attributed to history of brain metastasis. Mass effect on the posterior aspect of the left lateral ventricle unchanged.   CT Angiography:  Unremarkable CTA neck and brain.     9/10/24 Dr. Rios - inpatient consult  Her main complaint at this time is pain related to a suspected metastatic soft tissue mass which is located in the right ischioanal fat, measuring 3.8 cm. The plan as an outpatient was to proceed with U/S guided biopsy, likely followed by palliative radiation. Unfortunately, there has been a significant  delay in obtaining the biopsy.     9/10/24 MRI brain BT  Mixed treatment response.  - New tiny left temporal lobe metastatic lesion.  - Unchanged left paramedian parietal lobe and left occipital lobe metastatic lesions - largest in left occipital lobe.  - Unchanged nearly symmetric enhancement along the course of bilateral cranial nerve VII in both internal auditory canals, suspicious for leptomeningeal metastasis. Differential includes bilateral Bell's palsy.  - Slightly decreased size of left anterolateral frontal lobe, left paramedian frontal lobe, and cerebellar vermis metastatic lesions.  - Persistent diffuse vasogenic edema in left parietal, left occipital, and left posterior temporal lobes with mild mass effect on posterior aspect of left lateral ventricle, similar to recent CT heads but worsened since MRI brain 7/30/2024.     9/12/24 IR biopsy, Superficial perianal mass:  - Squamous cell carcinoma.   Note: The patient's prior lung EBUS sampling shows the tumor to stain for TTF1 and Napsin with absent p40 expression.  The current perianal mass sampling shows diffuse p40 expression with absent TTF1 expression.  This may represent a primary anal/perianal squamous cell carcinoma versus a possible metastatic combined lung tumor (adenocarcinoma and previously unsampled squamous component).  Suggest clinical correlation and appropriate follow-up.      Lumbar Puncture, CSF:  Negative for malignancy.  Anucleated squamous cells.      9/17/24 Dr. Castro  Biopsy perianal area shows squamous cell carcinoma.   Will have Rad Onc see patient as pain is unmanageable and will not use narcotics   She is seeing Palliative Care   Add celebrex 200 mg bid for now as taking extensive Tylenol with no pain relief   Was taking oxycodone, did not help and does not want dilaudid as lives alone    In terms of her metastatic disease, she was being treated for adenocarcinoma and this pathology is SCC; NSCLC is adenocarcinoma and thus  this may be new primary lesion   May consider Carboplatin with taxane   Cannot start IO therapy as decadron dose remains above 20 mg prednisone equivalent and attempts at tapering have led to worsening neurological symptoms  Follow-up in 1 week      9/17/24 Neurology   presenting evaluation of her seizures. Episodes of peripheral vision loss, waves and flashing in her right viual field.   She is currently maintained on levetiracetam 750mg bid without any side effects. No seizure events since starting keppra.      9/18/24 Palliative care      Upcoming appts  9/23/24 Dr. Castro  9/27/24 IR   10/2/24 Palliative care  10/7/24 Infusion          Malignant neoplasm of upper lobe, right bronchus or lung (HCC)   2024 Initial Diagnosis    Primary lung adenocarcinoma, right (HCC)     3/26/2024 Biopsy    A-C. Lymph Node, Level 4R :    - Metastatic non-small cell carcinoma, most compatible with a primary lung adenocarcinoma; see note.       D-F. Lymph Node, Level 10R:    - Metastatic non-small cell carcinoma; see note.       G-I. Lymph Node, Level 4L:    - Metastatic non-small cell carcinoma; see note.       4/15/2024 -  Cancer Staged    Staging form: Lung, AJCC 8th Edition  - Clinical stage from 4/15/2024: Stage IIIB (cT2b, cN3, cM0) - Signed by Rogelio Beebe DO on 4/15/2024       5/23/2024 - 7/2/2024 Chemotherapy    alteplase (CATHFLO), 2 mg, Intracatheter, Every 1 Minute as needed, 6 of 6 cycles  CARBOplatin (PARAPLATIN) IVPB (GOG AUC DOSING), 130.4 mg, Intravenous, Once, 6 of 6 cycles  Administration: 130.4 mg (5/23/2024), 144.8 mg (5/30/2024), 138.4 mg (6/6/2024), 144.8 mg (6/13/2024), 132 mg (6/21/2024), 143.6 mg (7/2/2024)  PACLItaxel (TAXOL) chemo IVPB, 45 mg/m2 = 67.2 mg (90 % of original dose 50 mg/m2), Intravenous, Once, 6 of 6 cycles  Dose modification: 45 mg/m2 (original dose 50 mg/m2, Cycle 1, Reason: Anticipated Tolerance)  Administration: 67.2 mg (5/23/2024), 67.2 mg (5/30/2024), 67.2 mg (6/6/2024),  67.2 mg (6/13/2024), 67.2 mg (6/21/2024), 67.2 mg (7/2/2024)     5/23/2024 - 7/5/2024 Radiation    Treatment:  Course: C1    Plan ID Energy Fractions Dose per Fraction (cGy) Dose Correction (cGy) Total Dose Delivered (cGy) Elapsed Days   R Lung_Hilum 6X 30 / 30 200 0 6,000 43      Treatment dates:  C1: 5/23/2024 - 7/5/2024 8/8/2024 - 8/8/2024 Radiation    SRS 5 PTVs   2000 cGy     9/12/2024 Biopsy    Mass, Superficial perianal mass:  - Squamous cell carcinoma.     Note: The patient's prior lung EBUS sampling shows the tumor to stain for TTF1 and Napsin with absent p40 expression.  The current perianal mass sampling shows diffuse p40 expression with absent TTF1 expression.  This may represent a primary anal/perianal squamous cell carcinoma versus a possible metastatic combined lung tumor (adenocarcinoma and previously unsampled squamous component).  Suggest clinical correlation and appropriate follow-up.         9/19/2024 -  Chemotherapy    cyanocobalamin, 1,000 mcg, Intramuscular, Once, 1 of 3 cycles  Administration: 1,000 mcg (8/19/2024)  alteplase (CATHFLO), 2 mg, Intracatheter, Every 1 Minute as needed, 0 of 6 cycles  fosaprepitant (EMEND) IVPB, 150 mg, Intravenous, Once, 0 of 6 cycles  CARBOplatin (PARAPLATIN) IVPB (GOG AUC DOSING), 525 mg, Intravenous, Once, 0 of 6 cycles  pemetrexed (ALIMTA) chemo infusion, 500 mg/m2 = 735 mg, Intravenous, Once, 0 of 6 cycles  pembrolizumab (KEYTRUDA) IVPB, 200 mg, Intravenous, Once, 0 of 5 cycles     Metastatic cancer to brain (HCC)   7/29/2024 Initial Diagnosis    Metastatic cancer to brain (HCC)     8/8/2024 - 8/8/2024 Radiation    SRS 5 PTVs   2000 cGy     9/12/2024 Biopsy    Mass, Superficial perianal mass:  - Squamous cell carcinoma.     Note: The patient's prior lung EBUS sampling shows the tumor to stain for TTF1 and Napsin with absent p40 expression.  The current perianal mass sampling shows diffuse p40 expression with absent TTF1 expression.  This may represent a  primary anal/perianal squamous cell carcinoma versus a possible metastatic combined lung tumor (adenocarcinoma and previously unsampled squamous component).  Suggest clinical correlation and appropriate follow-up.                  Assessment/Plan:     Ms. 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  She has been tolerating Hydrea 500 mg once daily Monday through Friday and off on Saturday and Sunday. Patient also with 4.1 cm RUL mass 1.5cm spiculated posterior nodule mediastinal adenopathy, no metastatic disease including brain MRI new diagnosis      ET:      Previously treated with hydroxyurea (HYDREA) 500 mg capsule     Plts 498 > 434 > 388 (holding hydroxyurea since 5/23/2024 2/2 drop in platelets from chemotherapy)     Plts 166 (7/4/2024) s/p C6 chemo, continuing to hold hydroxyurea, recheck labs and will plan to restart once labs show thrombocytosis.     Does not need to be restarted on hydrea; labs from 8/1/2024 were 192      Plts 8/24/2024 were 227      NSCLC/adenocarcinoma; mutational analysis pending      Now with RUL 4.2 cm mass with mediastinal adenopathy at least cT2a cN2 cM0 lung cancer     cT2b N3 Stage IIIB lung      Please see above regarding discussion      Carboplatin AUC 2 Taxol 45 mg/m2 weekly with RT     Durvalumab adjuvant post treatment depending on response     Neuropathy has resolved after last cycle of Taxol.     Esophagitis appearing better after completing radiation.     7/1/2024     Was admitted 6/22-6/24/2024 for neutropenic fever     WBC 2.5 ANC 1810 Hgb 9.0 plts 240 (hx ET/off hydrea)     Continue with C6 7/2/2024; last radiation 7/5/2024       7/15/2024     Completed C6 on 7/2/2024 and last radiation 7/5/2024.  Was admitted for neutropenic fever after last cycle of chemo.  Discharged on 7/4/2024 and has been asymptomatic since.  Labs at discharge showed WBC 1.5, ANC 1317 and Plt 166.     Repeat CT PET  "8/26/2024, radiology read from CT CAP in ED showed \"progression\" of disease but this study was performed during chemoradiation, so it is confounded by the inability to distinguish disease from inflammation.  Will  treatment response on repeat CT PET.     Recheck labs to confirm neutropenia has resolved.     Hold Otezla for psoriasis until neutropenia has resolved, but would like to restart as soon as safe as psoriasis is rebounding.     8/16/2024     Was admitted for new onset brain mets 7/30/2024; on decadron taper down to 1 mg daily next week     S/p SRS 6 fractions completed 8 August 2024 at [place]     Had modest response to treatment per PET in terms of the lung     At issue clearly are the new brain lesions 2.0x.4 left occiput as well as multiple other smaller lesions as below     In addition PET with new SUV 9.9 mass in the right anorectal fat/right ischial tuberosity 2.7x2 which is painful to patient and feels is growing     Will get MRI pelvis; will probably need biopsy.  Less likely abscess and more likely disease but unusual place for a NSCLC to metastasize to     Will start Carboplatin/Pemetrexed/Pembrolizumab and had long discussion regarding this         9/17/2024     Biopsy periranal area as follows:         . Mass, Superficial perianal mass:  - Squamous cell carcinoma.     Note: The patient's prior lung EBUS sampling shows the tumor to stain for TTF1 and Napsin with absent p40 expression.  The current perianal mass sampling shows diffuse p40 expression with absent TTF1 expression.  This may represent a primary anal/perianal squamous cell carcinoma versus a possible metastatic combined lung tumor (adenocarcinoma and previously unsampled squamous component).  Suggest clinical correlation and appropriate follow-up.      Immunohistochemistry was performed on block A3. The tumor cells are positive for p40, AE1/3, CK7, GATA3; MOC-13 shows nonspecific staining and negative for CDX2, p16, CD45, CD31, " synaptophysin, chromogranin, mucicarmine, p53 (wild-type), supporting the above diagnosis.  Napsin A is pending and results will be reported in an addendum.      Will have Rad Onc see patient as pain is unmanageable and will not use narcotics     Is seeing Palliative Care     Will add celebrex 200 mg bid for now as taking extensive Tylenol with no pain relief     Was taking oxycodone, did not help and does not want dilaudid as lives alone      In terms of her metastatic disease, she was being treated for adenocarcinoma and this pathology is SCC; NSCLC is adenocarcinoma and thus this may be new primary lesion     May consider Carboplatin with taxane     Cannot start IO therapy as decadron dose remains above 20 mg prednisone equivalent and attempts at tapering have led to worsening neurological symptoms       9/23/2024    Pain improved with Celebrex/Tylenol     Did see Rad Onc; planning 15 fractions to right gluteal area with path c/w SCC    Markers for lung adenocarcinoma as was [facility]    This occurred also on Carboplatin/Taxol and CRT    Thus, will treat as planned with Carboplatin/Pemetrexed/Pembrolizumab    Decadron 2 mg bid; plan to 2 mg daily next week.    Can remain on that as equivalent to 10 mg prednisone    Plan for chemo at [facility] 7th Oct 2024      Hypothyroid     TSH 16.3 free T4 0.77 prior to any IO therapy     Will start 50 mcg daily synthroid, adjust in 6 weeks as needed      TSH 9/13/2024 0.403 may need to adjust to 25 mcg daily-will assess repeat labs       Follow up     Follow up 8th Oct        History of present illness:  Initial Visit 4/10/2024     Ayla Nye is a 73-year-old female with past medical history of hypertension, psoriatic arthritis, CKD stage III, hyperlipidemia, and latent TB.  She is seen in follow-up for essential thrombocytosis.  She has had an elevated platelet count dating back to January 2028 all of her other cell lines were within normal limits.  Her highest platelet  count was around 700,000.  Myeloproliferative work-up demonstrated positive JAK2 mutation and she was started on 500 mg of Hydrea daily in July 2020.  Due to leukopenia her dose was reduced to Monday, Wednesday, Friday.  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  Then again in July we had to decrease her dose and she is taking 500 mg once daily Monday through Friday and not taking any on Saturday or Sunday.     She has been tolerating the 500 mg of Hydrea Monday through Friday off Saturday and Sunday well without any side effects.  She was seen by my attending and underwent further workup of her recurrent infections and was found to have a mildly low IgG level.   She has had pneumonia twice once in October and then again in February.  She states that she has had multiple imaging of her chest which does suggest scarring which prompted a CT scan of her chest .  .  She is also had recurrent sinus infections.  She is a former smoker and quit 15 years ago bit was 1[[d x more than 30 years.        She does not have any continued symptoms of pneumonia and no cough, shortness of breath, or fevers.       She underwent the following testing; EBUS with pulmonary as well as CT chest/PET scan     Lung mass on CT chest 2/28/2024     LUNGS:     -4.1 x 3.4 cm solid mass in the anterior right upper lobe (series 302, image 73).     -Posterior to this mass, there is a 1.5 x 1.5 cm spiculated nodule (series 302, image 78)     -2.5 x 2.1 cm groundglass nodule in the anterior left upper lobe (series 302, image 90)     Mild emphysema.     Right apical pleural-parenchymal scarring.     PLEURA: Small calcified left posterior pleural plaque, which may be from prior asbestos exposure or the sequela of old inflammation.     HEART/GREAT VESSELS: Normal heart  size. Mild-moderate coronary artery calcification. Mild to moderate mitral annular calcification. Trace pericardial effusion. No thoracic aortic aneurysm.     MEDIASTINUM AND SUDEEP: 1.6 x 2.0 cm right lower paratracheal/precarinal lymph node. 1.3 x 1.3 cm right lower paratracheal lymph node.        CT PET  3/28/2024     Intensely FDG avid right upper lobe mass, SUV max of 18. This abuts the anterior pleural margin. Central photopenia suggest necrosis. Mass measures on the order of 4.2 cm in size, stable previously 4.1 cm.     Subjacent 1.5 cm spiculated nodule posteriorly demonstrates minimal uptake, SUV max of 1.7.     Minimal FDG uptake in the left upper lobe groundglass nodule, SUV max of 2.2. This measured up to 2.5 cm in size on recent CT, appears grossly stable on low-dose CT.     A few FDG-avid mediastinal lymph nodes. Right anterior paratracheal lymph node demonstrates SUV max of 13. This measures up to 1.9 cm short axis image 85 series 3, may be slightly larger previously 1.6 cm.     A right perihilar lymph node demonstrates SUV max of 12. This measures on the order of 1.2 cm short axis image 89 series 3.  CT images: Scattered emphysematous changes of the lung fields. Mild to moderate coronary artery calcifications. Minimal left pleural calcification. Small calcified lymph nodes in the left perihilar region        3/26/2024     -C. Lymph Node, Level 4R (ThinPrep, smears and cell block preparations):    - Metastatic non-small cell carcinoma, most compatible with a primary lung adenocarcinoma; see note.    - Satisfactory for evaluation.     D-F. Lymph Node, Level 10R (ThinPrep, smears and cell block preparations):    - Metastatic non-small cell carcinoma; see note.    - Satisfactory for evaluation.     G-I. Lymph Node, Level 4L (ThinPrep, smears and cell block preparations):    - Metastatic non-small cell carcinoma; see note.    - Satisfactory for evaluation.      7/3/2024    CT CHEST, ABDOMEN AND PELVIS WITH  IV CONTRAST     INDICATION: hx of lung cancer, fever, cough. Fever (102.9 degrees Fahrenheit), cough, recently had chemotherapy.     COMPARISON: February 28, 2024     TECHNIQUE: CT examination of the chest, abdomen and pelvis was performed. Multiplanar 2D reformatted images were created from the source data.     This examination, like all CT scans performed in the [facility], was performed utilizing techniques to minimize radiation dose exposure, including the use of iterative reconstruction and automated exposure control. Radiation dose length   product (DLP) for this visit: 314 mGy-cm     IV Contrast: 100 mL of iohexol (OMNIPAQUE)  Enteric Contrast: Not administered.     FINDINGS:     CHEST     LUNGS: There is an approximately 4.6 x 4.8 x 3.3 cm mass within the anteromedial aspect of the right upper lobe of the lung. This mass abuts the anterior and anteromedial pleural surface and the right hand aspect of the upper mediastinum. The mass   demonstrates peripheral spiculation and areas of low density centrally, suggesting areas of necrosis. Just posterolateral to this mass there is a spiculated satellite lesion measuring approximately 1.6 cm. There is also a spiculated satellite lesion   anterior and inferior to the dominant mass, measuring approximately 1.4 cm and abutting the anterior pleural surface (series 302 image 91); this satellite mass appears new compared with February 28, 2024. An ill-defined groundglass opacity in the left   upper lobe of the lung measures approximately 2.5 cm, similar to the prior study.     There is mild to moderate emphysema. There is mild bibasilar scarring versus atelectasis. Right apical scarring unchanged.     PLEURA: Left pleural calcifications unchanged. No pleural effusions. No pneumothorax.     HEART/GREAT VESSELS: Coronary artery disease. There is atherosclerosis of the thoracic aorta. No thoracic aortic aneurysm.     MEDIASTINUM AND SUDEEP: There is a 2.4 x  2.2 cm right paratracheal node demonstrating low density centrally suggesting necrosis; this is larger compared with February 28, 2024. Additionally, there is a 2.8 x 2 cm precarinal node demonstrating low density   centrally suggesting necrosis, also larger compared to the prior study. Necrotic appearing right hilar adenopathy measuring up to 2 cm.     CHEST WALL AND LOWER NECK: Tiny thyroid nodules, measuring 6 mm or less. Incidental discovery of one or more thyroid nodule(s) measuring less than 1.5 cm and without suspicious features is noted in this patient who is above 35 years old; according to   guidelines published in the February 2015 white paper on incidental thyroid nodules in the Journal of the [employer] (JACR), no further evaluation is recommended.     AInterval History:  6/3/2024     Ms Nye is tolerating treatment fairly well but did have a reaction to Taxol at C2.  She has flushing and chest pain; drug was stopped she was given Benadryl/steroids/fluids and started at slower rate.  She has not had issues with N/V but with constipation.  Her joints are more painful.  She is eating but taste is an issue, probably due to Carboplatin.  She denies any GERD but has increased cough which is from CRT.  She has no SOB.  We discussed Mucinex to help cut the phlegm generated from treatment.  Her weight is 110 pounds which is slightly decreased; her BP is 118/64. She has no other issues          Interval History:  6/17/2024     Doing well; in week 5.  Has esophagitis and carafate slurry  was prescribed; she has not started it.  No N/V, has minimal numbness mostly feet.  Had issue with veins and extravasation fluid left lower arm.  Some reddness, healing, no infection.  Labs ok plts 388 WBC 5.9 Hgb 9.4.  States psoriasis is better.  Her weight is decreased to 104 but she is eating but less.       Interval History:  7/1/2024 6/22/2024 admitted due to neutropenic  fever, rigors.  She also  "endorsed fatigue which was post chemo.  In the ED, she was found to be fulfilling the SIRS criteria given her low white count, she was empirically started on broad-spectrum antibiotic.  On the following day antibiotics were stopped as she continued to improve.  During her hospitalization, she also developed diarrhea which was to be noninfective in origin.  She also received her scheduled radiation therapy  Discharged 6/24/2024; chemotherapy was held for neutropenia     She is doing better today; very anxious to stop treatment this week.  Feeling improved from admission.      Labs improved as above WBC 2.5 ANC 1810 plts 240 Na 141 k 4/1 Cr 0.85 ALT/AST 8/10      Interval History:  7/15/2024   Completed C6 on 7/2/2024 at [facility] and completed radiation on 7/5/2024.  Was admitted at [facility] for neutropenic fever after last cycle of chemo.  Discharged on 7/4/2024 and has been asymptomatic since.  CT CAP showed radiology read of \"progression\" of disease, but this scan was taken during chemoradiation and cannot distinguish between disease and inflammation.  Labs at discharge showed WBC 1.5, ANC 1317 and Plt 166.  Denied fever/chills, night sweats, adenopathy, or weight loss.  Endorsed worsening of psoriasis.  Neuropathy has resolved after last cycle of chemo.  Continues off hydroxyurea 2/2 normal platelet counts and continues off Otezla for psoriasis.            Interval History:  8/16/2024     Patient completed CRT in early July 2024, was admitted for NF and on that scan there appeared to be increase in the RUL mass but again had just finished concurrent treatment with significant inflammation     On 30th July , patient early in the day was well, mowing grass and then noted loss of balance, disorientation, confusion.  She also had vision \"flashes\" in the right field.  She was seen at [facility] as below:     Admission 7/30/2024    status post concurrent weekly Taxol/carboplatin with radiation therapy on May 2024 " and finished on 7/5/2024, the patient had change in mental status, imbalance, nausea admitted to the hospital on 7/29/2024, MRI of the brain showed multiple supratentorial and infratentorial enhancing lesion associated with vasogenic edema the largest lesion in the left occipital lobe measuring 2 x 1.4 cm with adjacent edema    Treatment options from a radiation therapy standpoint include SRS/SRT or whole brain radiation ± hippocampal avoidance. Given the small size and relatively small number of metastases, SRS would be the preferred treatment approach. I discussed with Dr. Norwood with consensus for SRS.     Possible short- and long-term side effects inclide but are not limited to, fatigue, headache, nausea, alopecia, vomiting, injury to the cranial nerves and/or visual structures including the optic nerve and chiasm, a low risk of neurocognitive effects, neurologic deficits, radionecrosis which may require treatment with steroids or surgical resection, hearing loss, stroke-like symptoms, and a low risk of radiation-induced secondary malignancy. Even with appropriate treatment, there is a risk of progression.  One some occasions, additional/other tumors may develop requiring re-evaluation.        MRI Brain 7/30/2024     1. Multiple supratentorial and infratentorial enhancing lesions with associated vasogenic edema, the largest of which is in the left occipital lobe. Findings are most consistent with metastases.     2. No acute infarct or midline shift.     She had the PET scan done after discharge as below:        PET scan 8/5/2024     Persistent hypermetabolic medial right upper lobe lung mass measuring 3.3 x 2.7 cm on image 132 of series 3 with max SUV of 10.0, previously measuring 3.7 x 2.9 cm with max SUV of 18.0 when utilizing similar measurement technique.     On image 132 of series 3 there is a stable adjacent 7 mm right upper lobe lung nodule with max SUV of 2.1, previously similar in size with max SUV of  1.7     Improving hypermetabolic right paratracheal mediastinal and right hilar yaw metastatic disease. For example, on image 137, hypermetabolic right paratracheal lymph node measures 7 mm in short axis with max SUV of 3.7, previously approximately 1.3 cm   with max SUV of 13.0.     Stable mildly FDG avid semisolid groundglass nodular opacity involving the left upper lobe measuring 2.6 x 1.1 cm on image 142 of series 3 with max SUV of 1.9, previously similar in size with max SUV of 2.2.  CT images: Atherosclerotic vascular calcifications including those of the coronary arteries are noted. Emphysematous changes.     1. New hypermetabolic soft tissue mass involving the right anorectal fat medial to the right ischial tuberosity. While this finding could reflect hypermetabolic metastatic disease related to patient's primary bronchogenic carcinoma the distribution is   atypical for metastatic disease from bronchogenic carcinoma. Therefore, consider further evaluation with tissue sampling to exclude secondary hypermetabolic malignancy.     2. Persistent hypermetabolic right upper lobe bronchogenic carcinoma with hypermetabolic mediastinal and right hilar yaw metastatic disease which overall is improved since prior PET/CT.     3. Hypermetabolic left occipital lobe metastatic disease. Patient's known intracranial metastatic disease was better characterized on prior MRI of the brain.     Today, she completed SRS at [facility] and is on a steroid taper; 2 mg daily decadron and will go to 1 mg daily decadron next week.  Completed RT 8 August 2024.  Doing well, no confusion, ambulating without issue.  Is on Keppra which she will remain on; 500 mg bid given.  Decadron will continue for now and probably for another additional week due to potential recall inflammation with Pembrolizumab.  She will get first line treatment in the metastatic setting with Carboplatin/Pemetrexed/Pembrolizumab.  Would like to start in one week.   Consent obtained.  Concern with IO causing more inflammation in the brain  as crosses BBB and thus may continue on 1 mg decadron which is equivalent to 5 mg prednisone and thus will not impede efficacy of IO therapy     Does have new right ischial tuberosity lesion on PET which she is having pain.  Will get MRI and may need RT for pain palliation but unclear if this is metastatic disease vs new primary as unusual for lung to spread in this fashion.     She has no fevers, bowel issues/changes to suggest abscess.             Interval History:  9/3/2024        Admitted 8//21-25 for increased vasogenic edema from too quick steroid taper.  Will have to do slower taper; hold IO until at 2 mg decadron      Current on 1 mg 3x this week and then 1 mg for one day next week then off.  Did have hairline fracture elbow due to fall after discharge.      Had an MRI right pelvis 8/30/2024 due to increasing pain and lesion noted on PET which is larger and has more pain     Soft tissue mass in the right ischioanal fat measuring 3.8 x 2.6 x 3.4 cm (5/33, 2/11), corresponding to the FDG avid lesion seen on prior PET/CT. It has hyperintense T2 weighted and hypointense T1-weighted signal with thick nodular   and irregular enhancement predominantly along its periphery with hypoenhancement at the center. Lesion size and has increased in size from prior PET CT dated 8/5/2024 at which time it measured 2.7 x 2.0 cm and CT study dated 7/3/2024 at which time it   measured 0.9 x 0.7 cm. Overall findings are suspicious for a malignant lesion, likely metastasis from patient's cancer.     It is very unlikely resectable, most likely due to her NSCLC but will attempt biopsy.     Will speak with Dr Gamboa and see if can get palliative radiation to control pain.  Is 3.8x2.6x3.4 cm noted on PET as well.  Was there in June and was 0.9x0.7 cm and 8/5/2024 was 2.7x2.0     She has not felt confused back on decadron and am concerned with stopping decadron      She will now be treated in the C2 slot on 16th Sept 2024 with her first cycle of Carboplatin/Alimta/Pembrolizumab     Her TSH baseline was elevated at 16.4 with free T4 0.77 and thus will start synthroid 50 mcg daily repeat TFTs in 6 weeks      Interval History: 9/17/2024     Patient is here for treatment and hospital follow up.     Admitted 9/9/24 to 9/12/24 for AMS found to have MRI Brain showing increased cerebral vasogenic edema after recently steroid taper (not stopped), c/f leptomeningeal mets, evidence of new tiny temporal lobe met, evidence of unchanged parietal and occipital mets, and evidence of decreased frontal lobe and cerebellar mets.  She was also noted to have a new mass of her deep R gluteal fossa.  Her mental status rapidly improved with steroids.  Patient underwent LP and biopsy of her R gluteal mass.  Initial LP studies showed 0 WBCs, 0 RBCs, normal glucose, slightly elevated protein at 73, and Gram stain and cultures were negative ruling out meningitis and ICH.  No evidence thus far of leptomeningeal metastasis on LP.  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     She was placed on a steroid taper at discharge on 9/12 as follows:       -dexamethasone 4 BID x4 days f/b       -dexamethasone 4 AM and 2 PM x7 days f/b       -dexamethasone 2 BID x7 days f/b       -dexamethasone 2 daily with further taper per heme/onc     Her levothyroxine was discontinued and will be followed with serial blood draws.     Labs from the day of her discharge (9/13/24) showed folate >22, B12 1268, , WBC 8.5, Hb 10.5, Plt 264, TSH 0.403, FT40.92, and FT3 2.33.     Patient endorsed acute on chronic anal pain that has worsened since her anal mass biopsy.  The pain is sharp and severe and occasionally radiates down her leg.  It is best in the morning after taking her steroids.  She was prescribed oxycodone 5 mg Q4H (36 MME) at hospital discharge which has not helped her  pain.  She spoke with palliative over the phone yesterday and has an appointment with them tomorrow.  They recommended changing from oxycodeone to Dilaudid 2 mg PO Q4H (60 MME) and they gave her Narcan.  She is very concerned about taking opioids as she lives alone and would not have anyone to give her Narcan.     She is amanable to radiation therapy and would like to start as soon as possible.           Interval History: 9/23/2024    Biopsy right subcutaneous pelvic mass is consistent with new primary SCC    This addendum is issued to add immunohistochemistry results. The final diagnosis stays the same.  Tumor cells are negative for napsin A.   Addendum electronically signed by Lulu Bailey MD on 9/17/2024 at  8:18 AM   Final Diagnosis   A. Mass, Superficial perianal mass:  - Squamous cell carcinoma.     Note: The patient's prior lung EBUS sampling shows the tumor to stain for TTF1 and Napsin with absent p40 expression.  The current perianal mass sampling shows diffuse p40 expression with absent TTF1 expression.  This may represent a primary anal/perianal squamous cell carcinoma versus a possible metastatic combined lung tumor (adenocarcinoma and previously unsampled squamous component).  Suggest clinical correlation and appropriate follow-up.      Immunohistochemistry was performed on block A3. The tumor cells are positive for p40, AE1/3, CK7, GATA3; MOC-13 shows nonspecific staining and negative for CDX2, p16, CD45, CD31, synaptophysin, chromogranin, mucicarmine, p53 (wild-type), supporting the above diagnosis.  Napsin A is pending and results will be reported in an addendum.        At this point, Rad Onc needs to radiate for palliation as with significant pain.  Started Celebrex last week as oxycodone was not helping, will not take dilaudid as prescribed by Palliative    At issue is two primary cancers, her lung metastatic, she is not going to tolerate treatment for anal cancer nor a workup and can consider chemo  with a taxane to cover SCC  However the immediate issue is the significant pain she has and RT is more appropriate       Plan is to start RT with 15 fractions within the week    Pain better with Celebrex 200 mg bid and Tylenol.  States holds her pain and again wants no narcotics.    She does have moon face from decadron but feels well and with the taper at 2 mg bid at present is not losing balance, having CNS issues    Plan is 2 mg decadron next week    For chemo 7th Oct although concern with IO issues with RT as well as chemo sensitization but do not want to delay chemotherapy at this point               REVIEW OF SYSTEMS:  Please note that a 14-point review of systems was performed to include Constitutional, HEENT, Respiratory, CVS, GI, , Musculoskeletal, Integumentary, Neurologic, Rheumatologic, Endocrinologic, Psychiatric, Lymphatic, and Hematologic/Oncologic systems were reviewed and are negative unless otherwise stated in HPI. Positive and negative findings pertinent to this evaluation are incorporated into the history of present illness.      ECOG PS: 1-2    PROBLEM LIST:  Patient Active Problem List   Diagnosis    TB lung, latent    Psoriatic arthritis (HCC)    Essential thrombocytosis (HCC)    Hypertension    Mixed hyperlipidemia    Encounter for monitoring of hydroxyurea therapy    JAK2 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    Age-related osteoporosis without current pathological fracture    Malignant neoplasm of upper lobe, right bronchus or lung (HCC)    Bilateral leg pain    Insomnia    Moderate protein-calorie malnutrition (HCC)    Dehydration    Visual disturbance    Metastatic cancer to brain (HCC)    Brain mass    Malignant neoplasm metastatic to brain (HCC)    Acute metabolic encephalopathy    Altered mental status    Hypothyroidism    Abnormal imaging of central nervous system    Right hip pain    Goals of care, counseling/discussion    Cancer related pain    Palliative care patient       Past Medical History:   has a  past medical history of Allergic (2022), Cancer (HCC), Cancer (HCC), Cancer (HCC), Cataract (Nov. 2023), Essential thrombocytosis (HCC), Hyperlipidemia, Hypertension, Mass in chest, Nodular goiter, Osteoporosis, Pneumonia (Oct 16, 2023), Psoriasis, and SIRS (systemic inflammatory response syndrome) (HCC) (06/22/2024).    PAST SURGICAL HISTORY:   has a past surgical history that includes Appendectomy; Mammo needle localization right (all inc) (Right, 07/13/2009); Skin lesion excision; Colonoscopy (10/31/2018); Mammo (historical); DXA procedure(historical) (04/21/2017); Breast cyst excision (Right, 2009); IR lumbar puncture (9/12/2024); and IR biopsy other (9/12/2024).    CURRENT MEDICATIONS  Current Outpatient Medications   Medication Sig Dispense Refill    acetaminophen (TYLENOL) 325 mg tablet Take 325 mg by mouth every 6 (six) hours as needed for mild pain Taking 3 tabs every 6 hours      aspirin 81 mg chewable tablet Chew 81 mg daily.      celecoxib (CeleBREX) 100 mg capsule Take 2 capsules (200 mg total) by mouth 2 (two) times a day 30 capsule 1    Cholecalciferol (VITAMIN D3) 5000 units TABS Take 5,000 Units by mouth Daily      dexamethasone (DECADRON) 2 mg tablet Take 1 tablet (2 mg total) by mouth 2 (two) times a day with meals for 63 doses PLEASE taper instructions in AVS.Take 4 mg (2 tab) twice daily starting this evening 9/12/24 and continue till the end of this week including [place] 9/15. Start taking 4 mg (2 tabs) in the morning and 2 mg (1 tab) in the evening starting 9/16 through 9/22.Start taking 2 mg (1 tab ) twice daily starting 9/23 through 9/29.Start taking 2 mg (1 tab) once daily until advised otherwise by your PCP or oncologist 63 tablet 0    diphenhydrAMINE (BENADRYL) 25 mg capsule Take 25 mg by mouth every 12 (twelve) hours as needed for itching      folic acid (FOLVITE) 1 mg tablet Take 1 tablet (1 mg total) by mouth daily 30 tablet 6    HYDROmorphone (DILAUDID) 2 mg tablet Take 1 tablet (2 mg  total) by mouth every 4 (four) hours as needed for moderate pain 1 tab po q 4 hrs prn pain/dyspnea Max Daily Amount: 12 mg 30 tablet 0    levETIRAcetam (Keppra) 750 mg tablet Take 1 tablet (750 mg total) by mouth 2 (two) times a day 60 tablet 0    Lidocaine 4 % PTCH Apply topically 4% applies to buttocks as needed every 8 to 12 hours      naloxone (NARCAN) 4 mg/0.1 mL nasal spray Administer 1 spray into a nostril. If no response after 2-3 minutes, give another dose in the other nostril using a new spray. 1 each 0    oxyCODONE (Roxicodone) 5 immediate release tablet Take 1 tablet (5 mg total) by mouth every 4 (four) hours as needed for moderate pain Max Daily Amount: 30 mg 40 tablet 0    pantoprazole (PROTONIX) 40 mg tablet TAKE 1 TABLET(40 MG) BY MOUTH DAILY EARLY MORNING 30 tablet 5    Probiotic Product (PROBIOTIC DAILY PO) Take 1 capsule by mouth daily      vitamin B-12 (VITAMIN B-12) 1,000 mcg tablet Take 1 tablet (1,000 mcg total) by mouth daily 90 tablet 1     No current facility-administered medications for this visit.     Facility-Administered Medications Ordered in Other Visits   Medication Dose Route Frequency Provider Last Rate Last Admin    fentaNYL injection   Intravenous PRN Greg Gamble CRNA        midazolam (VERSED) injection   Intravenous PRN Greg Gamble CRNA         [unfilled]    SOCIAL HISTORY:   reports that she has quit smoking. Her smoking use included cigarettes. She started smoking about 58 years ago. She has a 58.7 pack-year smoking history. She has been exposed to tobacco smoke. She has never used smokeless tobacco. She reports that she does not currently use alcohol. She reports that she does not use drugs.     FAMILY HISTORY:  family history includes Cancer in her brother and brother; Coronary artery disease in her brother; Depression in her mother; Hearing loss in her mother; Hypertension in her brother and mother; No Known Problems in her daughter, daughter, maternal aunt,  maternal aunt, maternal aunt, maternal aunt, maternal grandfather, maternal grandmother, paternal aunt, paternal grandfather, and paternal grandmother; Stroke in her mother; Tuberculosis in her brother and father.     ALLERGIES:  is allergic to doxycycline, keflex [cephalexin], and neomycin-polymyxin-dexameth.      Physical Exam:  Vital Signs:   Visit Vitals  OB Status Postmenopausal   Smoking Status Former     There is no height or weight on file to calculate BMI.  There is no height or weight on file to calculate BSA.    GEN: Alert, awake oriented x3, in no acute distress  HEENT- No pallor, icterus, cyanosis, no oral mucosal lesions,   LAD - no palpable cervical, clavicle, axillary, inguinal LAD  Heart- normal S1 S2, regular rate and rhythm, No murmur, rubs.   Lungs- clear breathing sound bilateral.   Abdomen- soft, Non tender, bowel sounds present  Extremities- No cyanosis, clubbing, edema  Neuro- No focal neurological deficit    Labs:  Lab Results   Component Value Date    WBC 8.52 09/13/2024    HGB 10.5 (L) 09/13/2024    HCT 31.9 (L) 09/13/2024     (H) 09/13/2024     09/13/2024     Lab Results   Component Value Date    SODIUM 139 09/13/2024    K 3.9 09/13/2024     09/13/2024    CO2 26 09/13/2024    AGAP 7 09/13/2024    BUN 30 (H) 09/13/2024    CREATININE 1.12 09/13/2024    GLUC 130 09/13/2024    GLUF 117 (H) 09/10/2024    CALCIUM 9.0 09/13/2024    AST 10 (L) 09/13/2024    ALT 16 09/13/2024    ALKPHOS 45 09/13/2024    TP 6.6 09/13/2024    TBILI 0.24 09/13/2024    EGFR 48 09/13/2024           I spent 40 minutes on chart review, face to face counseling time, coordination of care and documentation.    Rosalinda Castro MD PhD

## 2024-09-23 ENCOUNTER — OFFICE VISIT (OUTPATIENT)
Dept: RADIATION ONCOLOGY | Facility: HOSPITAL | Age: 74
End: 2024-09-23
Attending: INTERNAL MEDICINE
Payer: MEDICARE

## 2024-09-23 ENCOUNTER — OFFICE VISIT (OUTPATIENT)
Dept: HEMATOLOGY ONCOLOGY | Facility: CLINIC | Age: 74
End: 2024-09-23
Payer: MEDICARE

## 2024-09-23 VITALS
SYSTOLIC BLOOD PRESSURE: 138 MMHG | OXYGEN SATURATION: 99 % | RESPIRATION RATE: 18 BRPM | HEART RATE: 73 BPM | TEMPERATURE: 98.6 F | DIASTOLIC BLOOD PRESSURE: 68 MMHG

## 2024-09-23 VITALS
RESPIRATION RATE: 18 BRPM | DIASTOLIC BLOOD PRESSURE: 68 MMHG | SYSTOLIC BLOOD PRESSURE: 138 MMHG | WEIGHT: 117 LBS | TEMPERATURE: 98.6 F | HEART RATE: 73 BPM | OXYGEN SATURATION: 99 % | HEIGHT: 62 IN | BODY MASS INDEX: 21.53 KG/M2

## 2024-09-23 DIAGNOSIS — C34.11 MALIGNANT NEOPLASM OF UPPER LOBE, RIGHT BRONCHUS OR LUNG (HCC): Primary | ICD-10-CM

## 2024-09-23 DIAGNOSIS — I10 PRIMARY HYPERTENSION: ICD-10-CM

## 2024-09-23 DIAGNOSIS — Z15.89 JAK2 V617F MUTATION: ICD-10-CM

## 2024-09-23 DIAGNOSIS — C79.31 METASTASIS TO BRAIN (HCC): ICD-10-CM

## 2024-09-23 PROBLEM — C49.5: Status: ACTIVE | Noted: 2024-09-23

## 2024-09-23 PROBLEM — C79.89: Status: ACTIVE | Noted: 2024-09-23

## 2024-09-23 PROCEDURE — 99215 OFFICE O/P EST HI 40 MIN: CPT | Performed by: INTERNAL MEDICINE

## 2024-09-23 PROCEDURE — 99211 OFF/OP EST MAY X REQ PHY/QHP: CPT | Performed by: INTERNAL MEDICINE

## 2024-09-23 PROCEDURE — 77332 RADIATION TREATMENT AID(S): CPT | Performed by: INTERNAL MEDICINE

## 2024-09-23 PROCEDURE — G2211 COMPLEX E/M VISIT ADD ON: HCPCS | Performed by: INTERNAL MEDICINE

## 2024-09-23 PROCEDURE — 77290 THER RAD SIMULAJ FIELD CPLX: CPT | Performed by: INTERNAL MEDICINE

## 2024-09-23 NOTE — PROGRESS NOTES
Established Consultation - Radiation Oncology      Patient Name: Ayla Nye MRN:[id number] : 1950  Encounter: [id number]  Referring Provider: Harsh Hanna DO    Cancer Staging   Malignant neoplasm of upper lobe, right bronchus or lung (HCC)  Staging form: Lung, AJCC 8th Edition  - Clinical stage from 4/15/2024: Stage IIIB (cT2b, cN3, cM0) - Signed by Rogelio Beebe DO on 4/15/2024  - Clinical: Stage IV (cM1) - Signed by Honey Gamboa MD on 2024    ASSESSMENT & PLAN  Ayla Nye is a 73 y.o. female known to radiation oncology for [id number] (IIIB) NSCLC of the right upper lobe, s/p concurrent chemoRT completing on 24. She subsequently developed brain metastases s/p SRS on 24 to five supra- and infratentorial enhancing lesions. She had interval admissions related to down titration of steroids which responded to increased steroids. MRI Brain on 9/10/24 showed stability or decrease in some of the treated lesions, with a new tiny left temporal lobe lesion seen. There is unchanged enhancement along bilateral CN VII for which the case was reviewed at [facility] on 24. The enhancement was present on prior MRIs but an LP was recommended to rule out leptomeningeal carcinomatosis. This was performed and negative for malignancy. She also underwent MRI pelvis on 24 which revealed a 3.8 cm soft tissue mass in the right ischioanal fat suspicious for malignant lesion, likely a metastasis.  A biopsy of this area revealed squamous cell carcinoma. Her prior lung EBUS sampling suggested adenocarcinoma.  Per pathology this may represent a primary squamous cell carcinoma versus possible metastatic deposit from combined lung tumor (adenocarcinoma and previously unsampled squamous component).  She had a PET/CT on 2024 which showed this mass in the right anorectal fat without clear evidence of other FDG avid pelvic disease.  She is exquisitely symptomatic from this lesion, despite pain  medication.    We discussed her imaging and pathology findings in detail.  In particular we discussed the discrepancy between her current biopsy revealing squamous cell histology as compared to adenocarcinoma histology from prior lung biopsy.  We reviewed the various possibilities as indicated by medical oncology, pathology and radiology.  It is possible that she may have had a mixed adenocarcinoma/squamous non-small cell lung cancer with unsampled squamous component. The appearance of this lesion and absence of clear other primary source on recent PET/CT further support metastatic deposit though there may not be a way to know this definitively. In the chance this is a squamous deposit from another occult malignancy, palliative RT would still be appropriate given her excruciating pain refractory to medication and current ineligibility for systemic therapy.    We reviewed palliative radiation therapy (10 -15 daily treatments) to the right pelvic mass. Risks, benefits and alternatives were discussed. Side effects may include, but are not limited to, fatigue, dermatitis, gastrointestinal and genitourinary symptoms, scar tissue, nerve injury, bone weakness, or secondary malignancy. We discussed that the pain may persist/worsen despite radiation therapy.    The patient agreed to proceed with radiation therapy, and informed consent was signed. CT simulation to be scheduled at [place]. Regarding her brain, an MRI is scheduled on 10/8/24 to reassess her new temporal lesion. Depending on findings she may be amenable to additional SRS, to be discussed after obtaining this MRI. She continues on steroid taper.    Thank you for the opportunity to participate in the care of this patient.    Honey Gamboa MD  Department of Radiation Oncology  [facility]    No orders of the defined types were placed in this encounter.    1. Malignant neoplasm of upper lobe, right bronchus or lung (HCC)        2.  Metastasis to brain (HCC)            Total Time Spent  41 minutes spent reviewing EMR in preparation for visit, with the patient, coordination with other providers, and documentation.    CHIEF COMPLAINT  Chief Complaint   Patient presents with    Follow-up     Complex follow up radiation oncology     History of Present Illness  Returns for complex follow-up to discuss palliative RT to soft tissue perianal mass, SCC.     Patient has history of [id number] (IIIB) NSCLC of the right upper lobe, s/p concurrent chemoRT completing on 7/5/24. She completed a course of SRS on 8/8/24 after MRI brain demonstrated five supra- and infratentorial enhancing lesions compatible with metastases. She tolerated this treatment well. Last telemedicine follow-up visit on 8/15/24.   She had two hospital admission since her last follow-up for cerebral edema and pain related to soft tissue mass in R ischioanal fossa. She underwent IR biopsy of the mass on 9/12/24 revealing squamous cell carcinoma. She returns today to discuss palliative radiation to this site.   She is planned to initiate palliative systemic therapy, but this has been delayed due to multiple admissions and continued steroids.       8/21 - 8/23/24 Hospital admission  Presented to the ED on 8/21 with acute encephalopathy secondary to the metastatic lesion in the brain from lung cancer primary.  In the ED, labs/vitals were unremarkable but CT head was evident for worsening vasogenic edema particularly involving the left parietal occipital lobe surrounding patient's known metastatic lesion.  Vasogenic edema was also involved in the parafalcine left frontal lobe.Radiation Oncologist consulted and recommended a slower taper of dexamethasone. In the ED, Keppra was increased to 750 mg BID as per neurology recommendations at last admission.  Patient was discharged home with Decadron taper, Protonix, and Keppra 750 mg twice daily.  Patient will need to follow-up with her oncologist,  PCP, and neurology outpatient.     8/21/24 CT head wo contrast  1. Worsening vasogenic edema in particular involving the left parietal and occipital lobes, corresponding to worsening vasogenic edema surrounding the patient's known left occipital lobe metastasis as better depicted on prior brain MRI. This results in mass effect on the posterior body and trigone of left lateral ventricle.  2. Worsening vasogenic edema involving the parafalcine left frontal lobe at the site of the patient's known intracranial metastasis in this region as seen on prior brain MRI.  3. No intracranial hemorrhage noted. No midline shift.      8/30/24  MRI pelvis bony   Enhancing soft tissue lesion in the right ischioanal fat measuring 3.8 x 2.6 x 3.4 cm (FDG avid on prior PET/CT), increased in size from 8/5/2024 and 7/3/2024. Findings are suspicious for a malignant lesion, likely a metastasis from patient's lung cancer. Consider definitive characterization with tissue sampling.      9/3/24 Dr. Castro  Had modest response to treatment per PET in terms of the lung   issue clearly are the new brain lesions 2.0 x.4 left occiput as well as multiple other smaller lesions    In addition PET with new SUV 9.9 mass in the right anorectal fat/right ischial tuberosity 2.7x2 which is painful to patient and feels is growing   Will get MRI pelvis; will probably need biopsy.  Less likely abscess and more likely disease but unusual place for a NSCLC to metastasize to   Will start Carboplatin/Pemetrexed/Pembrolizumab   Follow-up in 2 weeks       9/9 - 9/12/24 Hospital admission  presented to the hospital on 9/9/2024 due to confusion and disorientation for two days before ED presentation.    repeat CT head showed essentially stable left parieto-occipital vasogenic edema compared to the prior admission.   Pt started on higher dosage of 4 mg dexamethasone and noted fairly quick improvement in her confusion and disorientation. Medical oncology and radiation  oncology were consulted for confusion and for pt's right hip pain related to suspected metastatic soft tissue mass in right ischioanal fat. MRI brain was obtained showing increased vasogenic edema, new brain mets and nearly symmetric enhancement along the course of bilateral cranial nerve VII in both internal auditory canals, suspicious for leptomeningeal metastasis. Lumbar puncture was performed and results are reassuring for ruling out meningitis and ICH. Right hip biopsy took place during hospitalization and palliative care was consulted. Pt to also follow up with palliative care outpatient.     Plan:  Continue dexamethasone 4 mg Q12 hours for the rest of this week. Then taper to 4 mg in a.m. and 2 mg in p.m. for all of next week. The following week, taper to 2 mg in the a.m. and 2 mg in p.m. for that week. Finally, taper to 2 mg daily until repeat MRI in October.  Follow up with oncology and radiation oncology outpatient.  Follow up with neurology outpatient.      9/9/24 CTA head and neck  CT Brain: Vasogenic edema left parietal and left occipital lobes similar to prior study in attributed to history of brain metastasis. Mass effect on the posterior aspect of the left lateral ventricle unchanged.   CT Angiography:  Unremarkable CTA neck and brain.     9/10/24 Dr. Rios - inpatient consult  Her main complaint at this time is pain related to a suspected metastatic soft tissue mass which is located in the right ischioanal fat, measuring 3.8 cm. The plan as an outpatient was to proceed with U/S guided biopsy, likely followed by palliative radiation. Unfortunately, there has been a significant delay in obtaining the biopsy.     9/10/24 MRI brain BT  Mixed treatment response.  - New tiny left temporal lobe metastatic lesion.  - Unchanged left paramedian parietal lobe and left occipital lobe metastatic lesions - largest in left occipital lobe.  - Unchanged nearly symmetric enhancement along the course of bilateral  cranial nerve VII in both internal auditory canals, suspicious for leptomeningeal metastasis. Differential includes bilateral Bell's palsy.  - Slightly decreased size of left anterolateral frontal lobe, left paramedian frontal lobe, and cerebellar vermis metastatic lesions.  - Persistent diffuse vasogenic edema in left parietal, left occipital, and left posterior temporal lobes with mild mass effect on posterior aspect of left lateral ventricle, similar to recent CT heads but worsened since MRI brain 7/30/2024.     9/12/24 IR biopsy, Superficial perianal mass:  - Squamous cell carcinoma.   Note: The patient's prior lung EBUS sampling shows the tumor to stain for TTF1 and Napsin with absent p40 expression.  The current perianal mass sampling shows diffuse p40 expression with absent TTF1 expression.  This may represent a primary anal/perianal squamous cell carcinoma versus a possible metastatic combined lung tumor (adenocarcinoma and previously unsampled squamous component).  Suggest clinical correlation and appropriate follow-up.      Lumbar Puncture, CSF:  Negative for malignancy.  Anucleated squamous cells.      9/17/24 Dr. Castro  Biopsy perianal area shows squamous cell carcinoma.   Will have Rad Onc see patient as pain is unmanageable and will not use narcotics   She is seeing Palliative Care   Add celebrex 200 mg bid for now as taking extensive Tylenol with no pain relief   Was taking oxycodone, did not help and does not want dilaudid as lives alone    In terms of her metastatic disease, she was being treated for adenocarcinoma and this pathology is SCC; NSCLC is adenocarcinoma and thus this may be new primary lesion   May consider Carboplatin with taxane   Cannot start IO therapy as decadron dose remains above 20 mg prednisone equivalent and attempts at tapering have led to worsening neurological symptoms  Follow-up in 1 week      9/17/24 Neurology   presenting evaluation of her seizures. Episodes of  peripheral vision loss, waves and flashing in her right viual field.   She is currently maintained on levetiracetam 750mg bid without any side effects. No seizure events since starting keppra.      9/18/24 Palliative care      Upcoming appts  9/23/24 Dr. Castro  9/27/24 IR   10/2/24 Palliative care  10/7/24 Infusion     Today, the patient reports significant pain in the right buttock region. She remains on steroids. She denies significant neurologic decline and is tolerating this steroid taper well. Currently at 2mg PO BID.    Oncology History   Malignant neoplasm of upper lobe, right bronchus or lung (HCC)   2024 Initial Diagnosis    Primary lung adenocarcinoma, right (HCC)     3/26/2024 Biopsy    A-C. Lymph Node, Level 4R :    - Metastatic non-small cell carcinoma, most compatible with a primary lung adenocarcinoma; see note.       D-F. Lymph Node, Level 10R:    - Metastatic non-small cell carcinoma; see note.       G-I. Lymph Node, Level 4L:    - Metastatic non-small cell carcinoma; see note.       4/15/2024 -  Cancer Staged    Staging form: Lung, AJCC 8th Edition  - Clinical stage from 4/15/2024: Stage IIIB (cT2b, cN3, cM0) - Signed by Rogelio Beebe DO on 4/15/2024       5/23/2024 - 7/2/2024 Chemotherapy    alteplase (CATHFLO), 2 mg, Intracatheter, Every 1 Minute as needed, 6 of 6 cycles  CARBOplatin (PARAPLATIN) IVPB (GOG AUC DOSING), 130.4 mg, Intravenous, Once, 6 of 6 cycles  Administration: 130.4 mg (5/23/2024), 144.8 mg (5/30/2024), 138.4 mg (6/6/2024), 144.8 mg (6/13/2024), 132 mg (6/21/2024), 143.6 mg (7/2/2024)  PACLItaxel (TAXOL) chemo IVPB, 45 mg/m2 = 67.2 mg (90 % of original dose 50 mg/m2), Intravenous, Once, 6 of 6 cycles  Dose modification: 45 mg/m2 (original dose 50 mg/m2, Cycle 1, Reason: Anticipated Tolerance)  Administration: 67.2 mg (5/23/2024), 67.2 mg (5/30/2024), 67.2 mg (6/6/2024), 67.2 mg (6/13/2024), 67.2 mg (6/21/2024), 67.2 mg (7/2/2024)     5/23/2024 - 7/5/2024 Radiation     Treatment:  Course: C1    Plan ID Energy Fractions Dose per Fraction (cGy) Dose Correction (cGy) Total Dose Delivered (cGy) Elapsed Days   R Lung_Hilum 6X 30 / 30 200 0 6,000 43      Treatment dates:  C1: 5/23/2024 - 7/5/2024 8/8/2024 - 8/8/2024 Radiation    SRS 5 PTVs   2000 cGy     9/12/2024 Biopsy    Mass, Superficial perianal mass:  - Squamous cell carcinoma.     Note: The patient's prior lung EBUS sampling shows the tumor to stain for TTF1 and Napsin with absent p40 expression.  The current perianal mass sampling shows diffuse p40 expression with absent TTF1 expression.  This may represent a primary anal/perianal squamous cell carcinoma versus a possible metastatic combined lung tumor (adenocarcinoma and previously unsampled squamous component).  Suggest clinical correlation and appropriate follow-up.         9/19/2024 -  Chemotherapy    cyanocobalamin, 1,000 mcg, Intramuscular, Once, 1 of 3 cycles  Administration: 1,000 mcg (8/19/2024)  alteplase (CATHFLO), 2 mg, Intracatheter, Every 1 Minute as needed, 0 of 6 cycles  fosaprepitant (EMEND) IVPB, 150 mg, Intravenous, Once, 0 of 6 cycles  CARBOplatin (PARAPLATIN) IVPB (GOG AUC DOSING), 525 mg, Intravenous, Once, 0 of 6 cycles  pemetrexed (ALIMTA) chemo infusion, 500 mg/m2 = 735 mg, Intravenous, Once, 0 of 6 cycles  pembrolizumab (KEYTRUDA) IVPB, 200 mg, Intravenous, Once, 0 of 5 cycles     9/23/2024 -  Cancer Staged    Staging form: Lung, AJCC 8th Edition  - Clinical: Stage IV (cM1) - Signed by Honey Gamboa MD on 9/23/2024       Metastatic cancer to brain (HCC)   7/29/2024 Initial Diagnosis    Metastatic cancer to brain (HCC)     8/8/2024 - 8/8/2024 Radiation    SRS 5 PTVs   2000 cGy     9/12/2024 Biopsy    Mass, Superficial perianal mass:  - Squamous cell carcinoma.     Note: The patient's prior lung EBUS sampling shows the tumor to stain for TTF1 and Napsin with absent p40 expression.  The current perianal mass sampling shows diffuse p40 expression  with absent TTF1 expression.  This may represent a primary anal/perianal squamous cell carcinoma versus a possible metastatic combined lung tumor (adenocarcinoma and previously unsampled squamous component).  Suggest clinical correlation and appropriate follow-up.           Historical Information   Past Medical History:   Diagnosis Date    Allergic     Allergic to neomiacin and cephalexin    Cancer (HCC)     lung cancer    Cancer (HCC)     mets to brain    Cancer (HCC)     perianal mass    Cataract 2023    Due to have durgery 2024    Essential thrombocytosis (HCC)     controlled with medication    Hyperlipidemia     Hypertension     Mass in chest     Nodular goiter     Osteoporosis     Pneumonia Oct 16, 2023    Also  2024    Psoriasis     SIRS (systemic inflammatory response syndrome) (HCC) 2024     Past Surgical History:   Procedure Laterality Date    APPENDECTOMY      BREAST CYST EXCISION Right     COLONOSCOPY  10/31/2018    DXA PROCEDURE (HISTORICAL)  2017    IR BIOPSY OTHER  2024    IR LUMBAR PUNCTURE  2024    MAMMO (HISTORICAL)      8-24-18    MAMMO NEEDLE LOCALIZATION RIGHT (ALL INC) Right 2009    SKIN LESION EXCISION      bridge of nose     Family History   Problem Relation Age of Onset    Stroke Mother     Depression Mother             Hypertension Mother     Hearing loss Mother     Tuberculosis Father     Cancer Brother         lung    Tuberculosis Brother     Coronary artery disease Brother     Hypertension Brother     No Known Problems Daughter     No Known Problems Daughter     No Known Problems Maternal Grandmother     No Known Problems Maternal Grandfather     No Known Problems Paternal Grandmother     No Known Problems Paternal Grandfather     No Known Problems Maternal Aunt     No Known Problems Maternal Aunt     No Known Problems Maternal Aunt     No Known Problems Maternal Aunt     No Known Problems Paternal Aunt     Cancer Brother          Throat cancer     Social History   Social History     Substance and Sexual Activity   Alcohol Use Not Currently     Social History     Substance and Sexual Activity   Drug Use Never     Social History     Tobacco Use   Smoking Status Former    Current packs/day: 1.00    Average packs/day: 1 pack/day for 58.7 years (58.7 ttl pk-yrs)    Types: Cigarettes    Start date: 1966    Passive exposure: Past   Smokeless Tobacco Never   Tobacco Comments    Quit 2010     Meds/Allergies     Current Outpatient Medications:     acetaminophen (TYLENOL) 325 mg tablet, Take 325 mg by mouth every 6 (six) hours as needed for mild pain Taking 3 tabs every 6 hours, Disp: , Rfl:     aspirin 81 mg chewable tablet, Chew 81 mg daily., Disp: , Rfl:     celecoxib (CeleBREX) 100 mg capsule, Take 2 capsules (200 mg total) by mouth 2 (two) times a day, Disp: 30 capsule, Rfl: 1    Cholecalciferol (VITAMIN D3) 5000 units TABS, Take 5,000 Units by mouth Daily, Disp: , Rfl:     dexamethasone (DECADRON) 2 mg tablet, Take 1 tablet (2 mg total) by mouth 2 (two) times a day with meals for 63 doses PLEASE taper instructions in AVS.Take 4 mg (2 tab) twice daily starting this evening 9/12/24 and continue till the end of this week including [place] 9/15. Start taking 4 mg (2 tabs) in the morning and 2 mg (1 tab) in the evening starting 9/16 through 9/22.Start taking 2 mg (1 tab ) twice daily starting 9/23 through 9/29.Start taking 2 mg (1 tab) once daily until advised otherwise by your PCP or oncologist, Disp: 63 tablet, Rfl: 0    diphenhydrAMINE (BENADRYL) 25 mg capsule, Take 25 mg by mouth every 12 (twelve) hours as needed for itching, Disp: , Rfl:     folic acid (FOLVITE) 1 mg tablet, Take 1 tablet (1 mg total) by mouth daily, Disp: 30 tablet, Rfl: 6    HYDROmorphone (DILAUDID) 2 mg tablet, Take 1 tablet (2 mg total) by mouth every 4 (four) hours as needed for moderate pain 1 tab po q 4 hrs prn pain/dyspnea Max Daily Amount: 12 mg, Disp: 30 tablet, Rfl: 0     levETIRAcetam (Keppra) 750 mg tablet, Take 1 tablet (750 mg total) by mouth 2 (two) times a day, Disp: 60 tablet, Rfl: 0    Lidocaine 4 % PTCH, Apply topically 4% applies to buttocks as needed every 8 to 12 hours, Disp: , Rfl:     oxyCODONE (Roxicodone) 5 immediate release tablet, Take 1 tablet (5 mg total) by mouth every 4 (four) hours as needed for moderate pain Max Daily Amount: 30 mg, Disp: 40 tablet, Rfl: 0    pantoprazole (PROTONIX) 40 mg tablet, TAKE 1 TABLET(40 MG) BY MOUTH DAILY EARLY MORNING, Disp: 30 tablet, Rfl: 5    Probiotic Product (PROBIOTIC DAILY PO), Take 1 capsule by mouth daily, Disp: , Rfl:     vitamin B-12 (VITAMIN B-12) 1,000 mcg tablet, Take 1 tablet (1,000 mcg total) by mouth daily, Disp: 90 tablet, Rfl: 1    naloxone (NARCAN) 4 mg/0.1 mL nasal spray, Administer 1 spray into a nostril. If no response after 2-3 minutes, give another dose in the other nostril using a new spray. (Patient not taking: Reported on 2024), Disp: 1 each, Rfl: 0  No current facility-administered medications for this visit.    Facility-Administered Medications Ordered in Other Visits:     fentaNYL injection, , Intravenous, PRN, Greg Gamble CRNA    midazolam (VERSED) injection, , Intravenous, PRN, Greg Gamble CRNA  Allergies   Allergen Reactions    Doxycycline Headache    Keflex [Cephalexin] Rash    Neomycin-Polymyxin-Dexameth Eye Swelling       Pathology:  See above.    Review of Systems  Refer to nursing note    OBJECTIVE:   /68 (BP Location: Left arm, Patient Position: Sitting, Cuff Size: Standard)   Pulse 73   Temp 98.6 °F (37 °C) (Temporal)   Resp 18   SpO2 99%   Pain Assessment:  4  Performance Status: ECO - Symptomatic, <50% confined to bed    Physical Exam  General Appearance:  Alert, cooperative, no distress, appears stated age  Lungs: Respirations unlabored, no cyanosis, able to speak in complete sentences without dyspnea.  Abdomen: Non-distended  Skin: No generalized rash or  "dermatitis- no gluteal skin change. BB placed in area of pain, deep palpation reveals mass in the right buttock. Exam chaperoned by female office staff member at CT simulation.  Neurologic: ANOx3, speech and cognition intact.    Portions of the record may have been created with voice recognition software.  Occasional wrong word or \"sound a like\" substitutions may have occurred due to the inherent limitations of voice recognition software.  Read the chart carefully and recognize, using context, where substitutions have occurred.  "

## 2024-09-23 NOTE — PROGRESS NOTES
Ayla Nye 1950 is a 73 y.o. female who returns for complex follow-up to discuss palliative RT to soft tissue perianal mass, SCC.     Patient has history of [id number] (IIIB) NSCLC of the right upper lobe, s/p concurrent chemoRT completing on 7/5/24. She completed a course of SRS on 8/8/24 after MRI brain demonstrated five supra- and infratentorial enhancing lesions compatible with metastases. She tolerated this treatment well. Last telemedicine follow-up visit on 8/15/24.   She had two hospital admission since her last follow-up for cerebral edema and pain related to soft tissue mass in R ischioanal fossa. She underwent IR biopsy of the mass on 9/12/24 revealing squamous cell carcinoma. She returns today to discuss palliative radiation to this site.   She is planned to initiate palliative systemic therapy, but this has been delayed due to multiple admissions and continued steroids.       8/21 - 8/23/24 Hospital admission  Presented to the ED on 8/21 with acute encephalopathy secondary to the metastatic lesion in the brain from lung cancer primary.  In the ED, labs/vitals were unremarkable but CT head was evident for worsening vasogenic edema particularly involving the left parietal occipital lobe surrounding patient's known metastatic lesion.  Vasogenic edema was also involved in the parafalcine left frontal lobe.Radiation Oncologist consulted and recommended a slower taper of dexamethasone. In the ED, Keppra was increased to 750 mg BID as per neurology recommendations at last admission.  Patient was discharged home with Decadron taper, Protonix, and Keppra 750 mg twice daily.  Patient will need to follow-up with her oncologist, PCP, and neurology outpatient.     8/21/24 CT head wo contrast  1. Worsening vasogenic edema in particular involving the left parietal and occipital lobes, corresponding to worsening vasogenic edema surrounding the patient's known left occipital lobe metastasis as better depicted  on prior brain MRI. This results in mass effect on the posterior body and trigone of left lateral ventricle.  2. Worsening vasogenic edema involving the parafalcine left frontal lobe at the site of the patient's known intracranial metastasis in this region as seen on prior brain MRI.  3. No intracranial hemorrhage noted. No midline shift.      8/30/24  MRI pelvis bony   Enhancing soft tissue lesion in the right ischioanal fat measuring 3.8 x 2.6 x 3.4 cm (FDG avid on prior PET/CT), increased in size from 8/5/2024 and 7/3/2024. Findings are suspicious for a malignant lesion, likely a metastasis from patient's lung cancer. Consider definitive characterization with tissue sampling.      9/3/24 Dr. Castro  Had modest response to treatment per PET in terms of the lung   issue clearly are the new brain lesions 2.0 x.4 left occiput as well as multiple other smaller lesions    In addition PET with new SUV 9.9 mass in the right anorectal fat/right ischial tuberosity 2.7x2 which is painful to patient and feels is growing   Will get MRI pelvis; will probably need biopsy.  Less likely abscess and more likely disease but unusual place for a NSCLC to metastasize to   Will start Carboplatin/Pemetrexed/Pembrolizumab   Follow-up in 2 weeks       9/9 - 9/12/24 Hospital admission  presented to the hospital on 9/9/2024 due to confusion and disorientation for two days before ED presentation.    repeat CT head showed essentially stable left parieto-occipital vasogenic edema compared to the prior admission.   Pt started on higher dosage of 4 mg dexamethasone and noted fairly quick improvement in her confusion and disorientation. Medical oncology and radiation oncology were consulted for confusion and for pt's right hip pain related to suspected metastatic soft tissue mass in right ischioanal fat. MRI brain was obtained showing increased vasogenic edema, new brain mets and nearly symmetric enhancement along the course of bilateral  cranial nerve VII in both internal auditory canals, suspicious for leptomeningeal metastasis. Lumbar puncture was performed and results are reassuring for ruling out meningitis and ICH. Right hip biopsy took place during hospitalization and palliative care was consulted. Pt to also follow up with palliative care outpatient.     Plan:  Continue dexamethasone 4 mg Q12 hours for the rest of this week. Then taper to 4 mg in a.m. and 2 mg in p.m. for all of next week. The following week, taper to 2 mg in the a.m. and 2 mg in p.m. for that week. Finally, taper to 2 mg daily until repeat MRI in October.  Follow up with oncology and radiation oncology outpatient.  Follow up with neurology outpatient.      9/9/24 CTA head and neck  CT Brain: Vasogenic edema left parietal and left occipital lobes similar to prior study in attributed to history of brain metastasis. Mass effect on the posterior aspect of the left lateral ventricle unchanged.   CT Angiography:  Unremarkable CTA neck and brain.     9/10/24 Dr. Rios - inpatient consult  Her main complaint at this time is pain related to a suspected metastatic soft tissue mass which is located in the right ischioanal fat, measuring 3.8 cm. The plan as an outpatient was to proceed with U/S guided biopsy, likely followed by palliative radiation. Unfortunately, there has been a significant delay in obtaining the biopsy.     9/10/24 MRI brain BT  Mixed treatment response.  - New tiny left temporal lobe metastatic lesion.  - Unchanged left paramedian parietal lobe and left occipital lobe metastatic lesions - largest in left occipital lobe.  - Unchanged nearly symmetric enhancement along the course of bilateral cranial nerve VII in both internal auditory canals, suspicious for leptomeningeal metastasis. Differential includes bilateral Bell's palsy.  - Slightly decreased size of left anterolateral frontal lobe, left paramedian frontal lobe, and cerebellar vermis metastatic lesions.  -  Persistent diffuse vasogenic edema in left parietal, left occipital, and left posterior temporal lobes with mild mass effect on posterior aspect of left lateral ventricle, similar to recent CT heads but worsened since MRI brain 7/30/2024.     9/12/24 IR biopsy, Superficial perianal mass:  - Squamous cell carcinoma.   Note: The patient's prior lung EBUS sampling shows the tumor to stain for TTF1 and Napsin with absent p40 expression.  The current perianal mass sampling shows diffuse p40 expression with absent TTF1 expression.  This may represent a primary anal/perianal squamous cell carcinoma versus a possible metastatic combined lung tumor (adenocarcinoma and previously unsampled squamous component).  Suggest clinical correlation and appropriate follow-up.      Lumbar Puncture, CSF:  Negative for malignancy.  Anucleated squamous cells.      9/17/24 Dr. Castro  Biopsy perianal area shows squamous cell carcinoma.   Will have Rad Onc see patient as pain is unmanageable and will not use narcotics   She is seeing Palliative Care   Add celebrex 200 mg bid for now as taking extensive Tylenol with no pain relief   Was taking oxycodone, did not help and does not want dilaudid as lives alone    In terms of her metastatic disease, she was being treated for adenocarcinoma and this pathology is SCC; NSCLC is adenocarcinoma and thus this may be new primary lesion   May consider Carboplatin with taxane   Cannot start IO therapy as decadron dose remains above 20 mg prednisone equivalent and attempts at tapering have led to worsening neurological symptoms  Follow-up in 1 week      9/17/24 Neurology   presenting evaluation of her seizures. Episodes of peripheral vision loss, waves and flashing in her right viual field.   She is currently maintained on levetiracetam 750mg bid without any side effects. No seizure events since starting keppra.      9/18/24 Palliative care      Upcoming appts  9/23/24 Dr. Castro  9/27/24 IR   10/2/24  Palliative care  10/7/24 Infusion       Oncology History   Malignant neoplasm of upper lobe, right bronchus or lung (HCC)   2024 Initial Diagnosis    Primary lung adenocarcinoma, right (HCC)     3/26/2024 Biopsy    A-C. Lymph Node, Level 4R :    - Metastatic non-small cell carcinoma, most compatible with a primary lung adenocarcinoma; see note.       D-F. Lymph Node, Level 10R:    - Metastatic non-small cell carcinoma; see note.       G-I. Lymph Node, Level 4L:    - Metastatic non-small cell carcinoma; see note.       4/15/2024 -  Cancer Staged    Staging form: Lung, AJCC 8th Edition  - Clinical stage from 4/15/2024: Stage IIIB (cT2b, cN3, cM0) - Signed by Rogelio Beebe DO on 4/15/2024       5/23/2024 - 7/2/2024 Chemotherapy    alteplase (CATHFLO), 2 mg, Intracatheter, Every 1 Minute as needed, 6 of 6 cycles  CARBOplatin (PARAPLATIN) IVPB (GOG AUC DOSING), 130.4 mg, Intravenous, Once, 6 of 6 cycles  Administration: 130.4 mg (5/23/2024), 144.8 mg (5/30/2024), 138.4 mg (6/6/2024), 144.8 mg (6/13/2024), 132 mg (6/21/2024), 143.6 mg (7/2/2024)  PACLItaxel (TAXOL) chemo IVPB, 45 mg/m2 = 67.2 mg (90 % of original dose 50 mg/m2), Intravenous, Once, 6 of 6 cycles  Dose modification: 45 mg/m2 (original dose 50 mg/m2, Cycle 1, Reason: Anticipated Tolerance)  Administration: 67.2 mg (5/23/2024), 67.2 mg (5/30/2024), 67.2 mg (6/6/2024), 67.2 mg (6/13/2024), 67.2 mg (6/21/2024), 67.2 mg (7/2/2024)     5/23/2024 - 7/5/2024 Radiation    Treatment:  Course: C1    Plan ID Energy Fractions Dose per Fraction (cGy) Dose Correction (cGy) Total Dose Delivered (cGy) Elapsed Days   R Lung_Hilum 6X 30 / 30 200 0 6,000 43      Treatment dates:  C1: 5/23/2024 - 7/5/2024 8/8/2024 - 8/8/2024 Radiation    SRS 5 PTVs   2000 cGy     9/12/2024 Biopsy    Mass, Superficial perianal mass:  - Squamous cell carcinoma.     Note: The patient's prior lung EBUS sampling shows the tumor to stain for TTF1 and Napsin with absent p40 expression.   The current perianal mass sampling shows diffuse p40 expression with absent TTF1 expression.  This may represent a primary anal/perianal squamous cell carcinoma versus a possible metastatic combined lung tumor (adenocarcinoma and previously unsampled squamous component).  Suggest clinical correlation and appropriate follow-up.         9/19/2024 -  Chemotherapy    cyanocobalamin, 1,000 mcg, Intramuscular, Once, 1 of 3 cycles  Administration: 1,000 mcg (8/19/2024)  alteplase (CATHFLO), 2 mg, Intracatheter, Every 1 Minute as needed, 0 of 6 cycles  fosaprepitant (EMEND) IVPB, 150 mg, Intravenous, Once, 0 of 6 cycles  CARBOplatin (PARAPLATIN) IVPB (GOG AUC DOSING), 525 mg, Intravenous, Once, 0 of 6 cycles  pemetrexed (ALIMTA) chemo infusion, 500 mg/m2 = 735 mg, Intravenous, Once, 0 of 6 cycles  pembrolizumab (KEYTRUDA) IVPB, 200 mg, Intravenous, Once, 0 of 5 cycles     9/23/2024 -  Cancer Staged    Staging form: Lung, AJCC 8th Edition  - Clinical: Stage IV (cM1) - Signed by Honey Gamboa MD on 9/23/2024       Metastatic cancer to brain (HCC)   7/29/2024 Initial Diagnosis    Metastatic cancer to brain (HCC)     8/8/2024 - 8/8/2024 Radiation    SRS 5 PTVs   2000 cGy     9/12/2024 Biopsy    Mass, Superficial perianal mass:  - Squamous cell carcinoma.     Note: The patient's prior lung EBUS sampling shows the tumor to stain for TTF1 and Napsin with absent p40 expression.  The current perianal mass sampling shows diffuse p40 expression with absent TTF1 expression.  This may represent a primary anal/perianal squamous cell carcinoma versus a possible metastatic combined lung tumor (adenocarcinoma and previously unsampled squamous component).  Suggest clinical correlation and appropriate follow-up.             Review of Systems:  Review of Systems   Eyes: Negative.    Respiratory: Negative.     Cardiovascular: Negative.    Gastrointestinal: Negative.    Endocrine: Negative.    Genitourinary: Negative.    Musculoskeletal:          Discomfort to right buttock   Skin: Negative.    Allergic/Immunologic: Negative.    Neurological: Negative.    Hematological: Negative.    Psychiatric/Behavioral:  Positive for sleep disturbance (Due to current steroid use).        Clinical Trial: no    Pregnancy test needed:  no    Teaching: [facility] radiation packet    Health Maintenance   Topic Date Due    RSV Vaccine Age 60+ Years (1 - 1-dose 60+ series) Never done    Zoster Vaccine (1 of 2) 03/28/2014    COVID-19 Vaccine (5 - 2023-24 season) 09/01/2024    Influenza Vaccine (1) 09/01/2024    Breast Cancer Screening: Mammogram  10/25/2024    Fall Risk  08/28/2025    Urinary Incontinence Screening  08/28/2025    Medicare Annual Wellness Visit (AWV)  08/28/2025    BMI: Adult  09/18/2025    Depression Screening  09/23/2025    DXA SCAN  10/25/2028    Colorectal Cancer Screening  08/13/2029    Hepatitis C Screening  Completed    Osteoporosis Screening  Completed    Pneumococcal Vaccine: 65+ Years  Completed    RSV Vaccine age 0-20 Months  Aged Out    HIB Vaccine  Aged Out    IPV Vaccine  Aged Out    Hepatitis A Vaccine  Aged Out    Meningococcal ACWY Vaccine  Aged Out    HPV Vaccine  Aged Out     Patient Active Problem List   Diagnosis    TB lung, latent    Psoriatic arthritis (HCC)    Essential thrombocytosis (HCC)    Hypertension    Mixed hyperlipidemia    Encounter for monitoring of hydroxyurea therapy    JAK2 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    Age-related osteoporosis without current pathological fracture    Malignant neoplasm of upper lobe, right bronchus or lung (HCC)    Bilateral leg pain    Insomnia    Moderate protein-calorie malnutrition (HCC)    Dehydration    Visual disturbance    Metastatic cancer to brain (HCC)    Brain mass    Malignant neoplasm metastatic to brain (HCC)    Acute metabolic encephalopathy    Altered mental status    Hypothyroidism    Abnormal imaging of central nervous system    Right hip pain    Goals of care, counseling/discussion    Cancer related pain     Palliative care patient    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)     Past Medical History:   Diagnosis Date    Allergic     Allergic to neomiacin and cephalexin    Cancer (HCC)     lung cancer    Cancer (HCC)     mets to brain    Cancer (HCC)     perianal mass    Cataract 2023    Due to have durgery 2024    Essential thrombocytosis (HCC)     controlled with medication    Hyperlipidemia     Hypertension     Mass in chest     Nodular goiter     Osteoporosis     Pneumonia Oct 16, 2023    Also  2024    Psoriasis     SIRS (systemic inflammatory response syndrome) (HCC) 2024     Past Surgical History:   Procedure Laterality Date    APPENDECTOMY      BREAST CYST EXCISION Right     COLONOSCOPY  10/31/2018    DXA PROCEDURE (HISTORICAL)  2017    IR BIOPSY OTHER  2024    IR LUMBAR PUNCTURE  2024    MAMMO (HISTORICAL)      8--    MAMMO NEEDLE LOCALIZATION RIGHT (ALL INC) Right 2009    SKIN LESION EXCISION      bridge of nose     Family History   Problem Relation Age of Onset    Stroke Mother     Depression Mother             Hypertension Mother     Hearing loss Mother     Tuberculosis Father     Cancer Brother         lung    Tuberculosis Brother     Coronary artery disease Brother     Hypertension Brother     No Known Problems Daughter     No Known Problems Daughter     No Known Problems Maternal Grandmother     No Known Problems Maternal Grandfather     No Known Problems Paternal Grandmother     No Known Problems Paternal Grandfather     No Known Problems Maternal Aunt     No Known Problems Maternal Aunt     No Known Problems Maternal Aunt     No Known Problems Maternal Aunt     No Known Problems Paternal Aunt     Cancer Brother         Throat cancer     Social History     Socioeconomic History    Marital status:      Spouse name: Not on file    Number of children: Not on file    Years of education: Not on file    Highest education level:  Not on file   Occupational History    Not on file   Tobacco Use    Smoking status: Former     Current packs/day: 1.00     Average packs/day: 1 pack/day for 58.7 years (58.7 ttl pk-yrs)     Types: Cigarettes     Start date: 1966     Passive exposure: Past    Smokeless tobacco: Never    Tobacco comments:     Quit 2010   Vaping Use    Vaping status: Never Used   Substance and Sexual Activity    Alcohol use: Not Currently    Drug use: Never    Sexual activity: Not Currently     Partners: Male     Birth control/protection: Post-menopausal   Other Topics Concern    Not on file   Social History Narrative    Not on file     Social Determinants of Health     Financial Resource Strain: Low Risk  (8/14/2023)    Overall Financial Resource Strain (CARDIA)     Difficulty of Paying Living Expenses: Not hard at all   Food Insecurity: No Food Insecurity (9/11/2024)    Hunger Vital Sign     Worried About Running Out of Food in the Last Year: Never true     Ran Out of Food in the Last Year: Never true   Transportation Needs: No Transportation Needs (9/11/2024)    PRAPARE - Transportation     Lack of Transportation (Medical): No     Lack of Transportation (Non-Medical): No   Physical Activity: Not on file   Stress: Not on file   Social Connections: Not on file   Intimate Partner Violence: Not on file   Housing Stability: Low Risk  (9/11/2024)    Housing Stability Vital Sign     Unable to Pay for Housing in the Last Year: No     Number of Times Moved in the Last Year: 0     Homeless in the Last Year: No       Current Outpatient Medications:     acetaminophen (TYLENOL) 325 mg tablet, Take 325 mg by mouth every 6 (six) hours as needed for mild pain Taking 3 tabs every 6 hours, Disp: , Rfl:     aspirin 81 mg chewable tablet, Chew 81 mg daily., Disp: , Rfl:     celecoxib (CeleBREX) 100 mg capsule, Take 2 capsules (200 mg total) by mouth 2 (two) times a day, Disp: 30 capsule, Rfl: 1    Cholecalciferol (VITAMIN D3) 5000 units TABS, Take 5,000  Units by mouth Daily, Disp: , Rfl:     dexamethasone (DECADRON) 2 mg tablet, Take 1 tablet (2 mg total) by mouth 2 (two) times a day with meals for 63 doses PLEASE taper instructions in AVS.Take 4 mg (2 tab) twice daily starting this evening 9/12/24 and continue till the end of this week including [place] 9/15. Start taking 4 mg (2 tabs) in the morning and 2 mg (1 tab) in the evening starting 9/16 through 9/22.Start taking 2 mg (1 tab ) twice daily starting 9/23 through 9/29.Start taking 2 mg (1 tab) once daily until advised otherwise by your PCP or oncologist, Disp: 63 tablet, Rfl: 0    diphenhydrAMINE (BENADRYL) 25 mg capsule, Take 25 mg by mouth every 12 (twelve) hours as needed for itching, Disp: , Rfl:     folic acid (FOLVITE) 1 mg tablet, Take 1 tablet (1 mg total) by mouth daily, Disp: 30 tablet, Rfl: 6    HYDROmorphone (DILAUDID) 2 mg tablet, Take 1 tablet (2 mg total) by mouth every 4 (four) hours as needed for moderate pain 1 tab po q 4 hrs prn pain/dyspnea Max Daily Amount: 12 mg, Disp: 30 tablet, Rfl: 0    levETIRAcetam (Keppra) 750 mg tablet, Take 1 tablet (750 mg total) by mouth 2 (two) times a day, Disp: 60 tablet, Rfl: 0    Lidocaine 4 % PTCH, Apply topically 4% applies to buttocks as needed every 8 to 12 hours, Disp: , Rfl:     oxyCODONE (Roxicodone) 5 immediate release tablet, Take 1 tablet (5 mg total) by mouth every 4 (four) hours as needed for moderate pain Max Daily Amount: 30 mg, Disp: 40 tablet, Rfl: 0    pantoprazole (PROTONIX) 40 mg tablet, TAKE 1 TABLET(40 MG) BY MOUTH DAILY EARLY MORNING, Disp: 30 tablet, Rfl: 5    Probiotic Product (PROBIOTIC DAILY PO), Take 1 capsule by mouth daily, Disp: , Rfl:     vitamin B-12 (VITAMIN B-12) 1,000 mcg tablet, Take 1 tablet (1,000 mcg total) by mouth daily, Disp: 90 tablet, Rfl: 1    naloxone (NARCAN) 4 mg/0.1 mL nasal spray, Administer 1 spray into a nostril. If no response after 2-3 minutes, give another dose in the other nostril using a new spray.  (Patient not taking: Reported on 9/23/2024), Disp: 1 each, Rfl: 0  No current facility-administered medications for this visit.    Facility-Administered Medications Ordered in Other Visits:     fentaNYL injection, , Intravenous, PRN, Greg Gamble CRNA    midazolam (VERSED) injection, , Intravenous, PRN, Greg Gamble CRNA  Allergies   Allergen Reactions    Doxycycline Headache    Keflex [Cephalexin] Rash    Neomycin-Polymyxin-Dexameth Eye Swelling     Vitals:    09/23/24 1359   BP: 138/68   BP Location: Left arm   Patient Position: Sitting   Cuff Size: Standard   Pulse: 73   Resp: 18   Temp: 98.6 °F (37 °C)   TempSrc: Temporal   SpO2: 99%      Pain Score:   4

## 2024-09-24 ENCOUNTER — OFFICE VISIT (OUTPATIENT)
Age: 74
End: 2024-09-24
Payer: MEDICARE

## 2024-09-24 VITALS
HEART RATE: 60 BPM | RESPIRATION RATE: 16 BRPM | BODY MASS INDEX: 21.81 KG/M2 | HEIGHT: 62 IN | WEIGHT: 118.5 LBS | DIASTOLIC BLOOD PRESSURE: 78 MMHG | TEMPERATURE: 97.3 F | OXYGEN SATURATION: 99 % | SYSTOLIC BLOOD PRESSURE: 130 MMHG

## 2024-09-24 DIAGNOSIS — Z23 ENCOUNTER FOR IMMUNIZATION: ICD-10-CM

## 2024-09-24 DIAGNOSIS — E44.0 MODERATE PROTEIN-CALORIE MALNUTRITION (HCC): ICD-10-CM

## 2024-09-24 DIAGNOSIS — C79.31 METASTATIC CANCER TO BRAIN (HCC): ICD-10-CM

## 2024-09-24 DIAGNOSIS — M81.0 AGE-RELATED OSTEOPOROSIS WITHOUT CURRENT PATHOLOGICAL FRACTURE: ICD-10-CM

## 2024-09-24 DIAGNOSIS — C34.11 MALIGNANT NEOPLASM OF UPPER LOBE, RIGHT BRONCHUS OR LUNG (HCC): ICD-10-CM

## 2024-09-24 DIAGNOSIS — E78.2 MIXED HYPERLIPIDEMIA: ICD-10-CM

## 2024-09-24 DIAGNOSIS — I10 PRIMARY HYPERTENSION: Primary | ICD-10-CM

## 2024-09-24 PROCEDURE — 90662 IIV NO PRSV INCREASED AG IM: CPT

## 2024-09-24 PROCEDURE — 99495 TRANSJ CARE MGMT MOD F2F 14D: CPT

## 2024-09-24 PROCEDURE — G0008 ADMIN INFLUENZA VIRUS VAC: HCPCS

## 2024-09-24 NOTE — PROGRESS NOTES
Transition of Care Visit  Name: Ayla Nye      : 1950      MRN: [id number]  Encounter Provider: Rafy Angelo MD  Encounter Date: 2024   Encounter department: [facility]    Assessment & Plan  Primary hypertension  Blood pressure stable without medication we will continue to monitor       Mixed hyperlipidemia  Under control without medication we will continue to monitor       Metastatic cancer to brain (HCC)  Patient is scheduled to get immunotherapy.  Patient has been followed by oncologist.       Moderate protein-calorie malnutrition (HCC)  Weight remaining stable.  We will continue to monitor       Malignant neoplasm of upper lobe, right bronchus or lung (HCC)  Patient has been followed by oncologist.       Age-related osteoporosis without current pathological fracture  Currently not on any medication at present time.  We will continue to monitor       Encounter for immunization    Orders:    influenza vaccine, high-dose, PF 0.5 mL (Fluzone High Dose)         History of Present Illness     Transitional Care Management Review:   Ayla Nye is a 73 y.o. female here for TCM follow up.     During the TCM phone call patient stated:  TCM Call       Date and time call was made  2024  3:45 PM    Hospital care reviewed  Records reviewed    Patient was hospitialized at  [facility]    Date of Admission  24    Date of discharge  24    Diagnosis  Malignant neoplasm of right upper lobe of lung    Disposition  Home    Were the patients medications reviewed and updated  Yes    Current Symptoms  Weakness; Leg pain - left side; Fatigue    Left side leg pain severity  Moderat    Weakness severity  Moderate    Fatigue severity  Mild          TCM Call       Post hospital issues  Reduced activity    Should patient be enrolled in anticoag monitoring?  No    Scheduled for follow up?  Yes    Referrals needed  Left message for patient to return call to  office to schedule a TCM appointment    Did you obtain your prescribed medications  Yes    Do you need help managing your prescriptions or medications  No    Is transportation to your appointment needed  No    I have advised the patient to call PCP with any new or worsening symptoms  Reba Carrion LPN    Living Arrangements  Alone    Support System  Family          Patient here for transitional care management as patient was hospitalized on September 9, 2024 and discharged on September 12, 2024 at [facility] for altered mental status all the records from the hospital reviewed.  Patient confusion was because of past tapering of steroid which caused her brain swelling again.  Currently patient feeling okay she is on tapering dose of dexamethasone.  Patient denies any chest pain, shortness of breath, abdominal pain, nausea, vomiting, fever or chills.  No lightheadedness or dizziness.  No tingling or muscle weakness.  No headache.  No other new problem.      Review of Systems   Constitutional:  Positive for fatigue. Negative for chills and fever.   HENT:  Negative for congestion, ear pain, rhinorrhea, sneezing and sore throat.    Eyes:  Positive for visual disturbance. Negative for redness and itching.   Respiratory:  Negative for cough, chest tightness and shortness of breath.    Cardiovascular:  Negative for chest pain, palpitations and leg swelling.   Gastrointestinal:  Negative for abdominal pain, blood in stool, diarrhea, nausea and vomiting.   Endocrine: Negative for cold intolerance and heat intolerance.   Genitourinary:  Negative for dysuria, frequency and urgency.   Musculoskeletal:  Negative for arthralgias, back pain and myalgias.   Skin:  Negative for color change and rash.   Neurological:  Negative for dizziness, weakness, light-headedness, numbness and headaches.   Hematological:  Does not bruise/bleed easily.   Psychiatric/Behavioral:  Negative for agitation, behavioral  "problems and confusion.      Objective     /78 (BP Location: Left arm, Patient Position: Sitting, Cuff Size: Standard)   Pulse 60   Temp (!) 97.3 °F (36.3 °C) (Tympanic)   Resp 16   Ht 5' 2\" (1.575 m)   Wt 53.8 kg (118 lb 8 oz)   SpO2 99%   BMI 21.67 kg/m²     Physical Exam  Vitals and nursing note reviewed.   Constitutional:       General: She is not in acute distress.     Appearance: Normal appearance. She is well-developed. She is not ill-appearing, toxic-appearing or diaphoretic.   HENT:      Head: Normocephalic and atraumatic.      Nose: Nose normal.      Mouth/Throat:      Mouth: Mucous membranes are moist.      Pharynx: Oropharynx is clear. No posterior oropharyngeal erythema.   Eyes:      General: No scleral icterus.        Right eye: No discharge.         Left eye: No discharge.      Extraocular Movements: Extraocular movements intact.      Conjunctiva/sclera: Conjunctivae normal.      Pupils: Pupils are equal, round, and reactive to light.   Cardiovascular:      Rate and Rhythm: Normal rate and regular rhythm.      Pulses: Normal pulses.      Heart sounds: Normal heart sounds. No murmur heard.     No gallop.   Pulmonary:      Effort: Pulmonary effort is normal. No respiratory distress.      Breath sounds: Normal breath sounds. No wheezing or rales.   Abdominal:      General: Abdomen is flat. Bowel sounds are normal. There is no distension.      Palpations: Abdomen is soft.      Tenderness: There is no abdominal tenderness. There is no right CVA tenderness, left CVA tenderness or guarding.   Musculoskeletal:         General: No swelling or tenderness. Normal range of motion.      Cervical back: Normal range of motion and neck supple. No rigidity.      Right lower leg: No edema.      Left lower leg: No edema.   Lymphadenopathy:      Cervical: No cervical adenopathy.   Skin:     General: Skin is warm and dry.      Capillary Refill: Capillary refill takes 2 to 3 seconds.      Coloration: Skin is " not jaundiced or pale.      Findings: No bruising.   Neurological:      General: No focal deficit present.      Mental Status: She is alert and oriented to person, place, and time. Mental status is at baseline.      Sensory: No sensory deficit.      Gait: Gait normal.   Psychiatric:         Mood and Affect: Mood normal.         Behavior: Behavior normal.       Medications have been reviewed by provider in current encounter    Administrative Statements

## 2024-09-26 PROCEDURE — 77295 3-D RADIOTHERAPY PLAN: CPT | Performed by: INTERNAL MEDICINE

## 2024-09-26 PROCEDURE — 77300 RADIATION THERAPY DOSE PLAN: CPT | Performed by: INTERNAL MEDICINE

## 2024-09-26 PROCEDURE — 77334 RADIATION TREATMENT AID(S): CPT | Performed by: INTERNAL MEDICINE

## 2024-09-30 ENCOUNTER — PATIENT MESSAGE (OUTPATIENT)
Dept: HEMATOLOGY ONCOLOGY | Facility: CLINIC | Age: 74
End: 2024-09-30

## 2024-09-30 DIAGNOSIS — C34.11 MALIGNANT NEOPLASM OF UPPER LOBE, RIGHT BRONCHUS OR LUNG (HCC): Primary | ICD-10-CM

## 2024-09-30 DIAGNOSIS — E03.9 HYPOTHYROIDISM, UNSPECIFIED TYPE: Primary | ICD-10-CM

## 2024-09-30 DIAGNOSIS — C34.11 MALIGNANT NEOPLASM OF UPPER LOBE, RIGHT BRONCHUS OR LUNG (HCC): ICD-10-CM

## 2024-10-01 ENCOUNTER — APPOINTMENT (OUTPATIENT)
Dept: RADIATION ONCOLOGY | Facility: HOSPITAL | Age: 74
End: 2024-10-01
Attending: INTERNAL MEDICINE
Payer: MEDICARE

## 2024-10-01 PROCEDURE — 77280 THER RAD SIMULAJ FIELD SMPL: CPT | Performed by: INTERNAL MEDICINE

## 2024-10-01 PROCEDURE — 77412 RADIATION TX DELIVERY LVL 3: CPT | Performed by: INTERNAL MEDICINE

## 2024-10-01 PROCEDURE — 77427 RADIATION TX MANAGEMENT X5: CPT | Performed by: INTERNAL MEDICINE

## 2024-10-01 PROCEDURE — 77387 GUIDANCE FOR RADJ TX DLVR: CPT | Performed by: INTERNAL MEDICINE

## 2024-10-02 ENCOUNTER — APPOINTMENT (OUTPATIENT)
Dept: RADIATION ONCOLOGY | Facility: HOSPITAL | Age: 74
End: 2024-10-02
Attending: INTERNAL MEDICINE
Payer: MEDICARE

## 2024-10-02 ENCOUNTER — OFFICE VISIT (OUTPATIENT)
Age: 74
End: 2024-10-02
Payer: MEDICARE

## 2024-10-02 VITALS
WEIGHT: 119.2 LBS | RESPIRATION RATE: 18 BRPM | OXYGEN SATURATION: 99 % | SYSTOLIC BLOOD PRESSURE: 148 MMHG | BODY MASS INDEX: 21.94 KG/M2 | HEART RATE: 73 BPM | HEIGHT: 62 IN | DIASTOLIC BLOOD PRESSURE: 77 MMHG | TEMPERATURE: 98 F

## 2024-10-02 DIAGNOSIS — Z51.5 PALLIATIVE CARE PATIENT: Primary | ICD-10-CM

## 2024-10-02 DIAGNOSIS — G89.3 CANCER RELATED PAIN: ICD-10-CM

## 2024-10-02 DIAGNOSIS — C34.11 MALIGNANT NEOPLASM OF UPPER LOBE, RIGHT BRONCHUS OR LUNG (HCC): ICD-10-CM

## 2024-10-02 DIAGNOSIS — C49.5: ICD-10-CM

## 2024-10-02 DIAGNOSIS — G47.00 INSOMNIA: ICD-10-CM

## 2024-10-02 PROCEDURE — 77387 GUIDANCE FOR RADJ TX DLVR: CPT | Performed by: RADIOLOGY

## 2024-10-02 PROCEDURE — 77412 RADIATION TX DELIVERY LVL 3: CPT | Performed by: RADIOLOGY

## 2024-10-02 PROCEDURE — 77014 CHG CT GUIDANCE RADIATION THERAPY FLDS PLACEMENT: CPT | Performed by: RADIOLOGY

## 2024-10-02 PROCEDURE — 99349 HOME/RES VST EST MOD MDM 40: CPT

## 2024-10-02 PROCEDURE — G2211 COMPLEX E/M VISIT ADD ON: HCPCS

## 2024-10-02 NOTE — PROGRESS NOTES
"Ayla Nye was seen in the home today for follow up for symptom management for cancer related pain, lung cancer with mets to brain and perianal tumor.  Patient seen unaccompanied. Patient  provided all pertinent information today.      Symptom Management    ESAS Scale - Please rate from 0-10 the degree to which you experience the following symptoms (0 being none to 10 being the worst):  Pain - 3 - \"feels like I am sitting on a softball ', pain radiates down right leg - uses heating pad and lidocaine patch (not at the same time), takes Tylenol as needed and Celebrex a prescribed, pain is worse at night  Tired - 0  Drowsiness - 4 - in afternoon  Nausea - 0  Appetite - 0  Shortness of Breath - 0  Depression - 1  Anxiety - 1  Wellbeing - 5  Sleeping - 10 - will increase Melatonin from 3 mg to 6 mg  Bowel movements - 2 - taking Colace, Metamucil Gummies, and prune juice    Ayla Nye 's medications were verified today with Ayla. Ayla is managing the medications. All medications are up to date.     Allergies verified. No new allergies.    All medical, surgical, family and social history were reviewed with Ayla. There are no updates to patient's history.    All questions and concerns were addressed.  Patient was appreciative of the visit.    Next visit with RN in 1 week and 1 month with RN and provider.    "

## 2024-10-02 NOTE — PROGRESS NOTES
[facility] Visit: follow up   Name: Ayla Nye      : 1950      MRN: [id number]  Encounter Provider: ZULEYMA Pollard  Encounter Date: 10/2/2024   Encounter department: [facility]    Assessment & Plan   1. Palliative care patient  Assessment & Plan:  Ayla follows with palliative care for psychosocial support and symptom management of lung cancer with brain mets, perianal tumor   Symptoms - pain, sleep trouble   ACP -  on file   RTC - 1 week with RN, 4 weeks with provider    Lives alone.   Has two daughters .   Ex- is supportive and involved in care  Has brother that lives close by that helps as well.     No longer drives.     2. Insomnia  Assessment & Plan:  Patient states she has toruble sleeping  Sleeps a few hours, wakes and will find that she eats to pass the time  On steroid taper    Plan:  Increase Melatonin 6mg QHS    3. Cancer related pain  Assessment & Plan:  Current regimen:  Celebrex 200mg BID  Tylenol PRN  Lidocaine patch at hs  Heating pad during the day  Failed therapies:  Oxycodone- felt like she had a headache from the medication  Bowel regimen to prevent OIC:  Miralax, colace, metamucil gummies, prune juice  I have reviewed the patient's controlled substance dispensing history in the Prescription Drug Monitoring Program in compliance with the [facility] regulations before prescribing any controlled substance    4. 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   Follows with Dr. Castro outpatient.  Treated with chemoradiotherapy therapy.     5. 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 Castro med oncology  Scheduled for 15 treatments of radiation therapy- started 10/1  Plan to start immunochemotherapy on 10/6, every 3 weeks for 6  cycles.  Carboplatin  Pemetrexed   Pembrolizumab      Subjective   Chief Complaint   Patient presents with    Follow-up     Follow up for symptom management for cancer related pain, palliative diagnosis of lung cancer with mets to brain and perianal tumor.        History of Present Illness     Ayla Nye is a 73 y.o. female w/ Stage III NSCLC, new onset brain mets 7/30/24, completed SRS. She was found to have mass in the right anorectal fat/right ischial tuberosity per PET. She follows with [facility] Hem/onc .     Ayla is seen in her home with palliative RN and CMOC.   She states that she has started radiation treatment has a total of 15 treatments to complete. She is scheduled to start infusions on Monday 10/7- at  [facility].      Ayla is seen in the home today.  Palliative RN and CMOC present.    Patient reports that she had her appointment with the radiation oncology, and had started her first radiation treatment yesterday she has a total of 15 treatments to complete.  She also will be starting her chemo immunotherapy on Monday at the [facility].   She reports that she has been taking Celebrex 200 mg twice a day, she has also been using Tylenol 3 times a day, she uses a lidocaine patch at night and will use the heating pad during the day.  She reports that her pain is still there but for the most part is controlled during the day.  She is still on steroids 2 mg of the dexamethasone she continues to work on tapering them.  Due to the steroids she has had an increase in her weight her appetite is great and she is also having some difficulty sleeping.  She reports that she does try to take naps during the day and listens to her body when she is tired.  She reports that she has been taking 3 mg of melatonin, she would like to try an increase to 6 mg to see if this will help with her sleep.   She reports that she has a history of eczema and occasionally will take a Benadryl to help with the  itching at night.  We discussed the risk and benefits of using Benadryl.  Having regular BMs, drinking prune juice using Colace and taking Metamucil Gummies.    Patient endorses that she has a lot of support from her daughters ex- and extended family.  She has transportation to and from appointments and treatments as she no longer drives.  CMOC did discuss [employer] transportation with patient, at this time she is not interested as she has support.        Current Outpatient Medications:     acetaminophen (TYLENOL) 325 mg tablet, Take 325 mg by mouth every 6 (six) hours as needed for mild pain Taking 3 tabs every 6 hours, Disp: , Rfl:     aspirin 81 mg chewable tablet, Chew 81 mg daily., Disp: , Rfl:     celecoxib (CeleBREX) 100 mg capsule, Take 2 capsules (200 mg total) by mouth 2 (two) times a day, Disp: 30 capsule, Rfl: 1    Cholecalciferol (VITAMIN D3) 5000 units TABS, Take 2,000 Units by mouth Daily, Disp: , Rfl:     dexamethasone (DECADRON) 2 mg tablet, Take 1 tablet (2 mg total) by mouth 2 (two) times a day with meals for 63 doses PLEASE taper instructions in AVS.Take 4 mg (2 tab) twice daily starting this evening 9/12/24 and continue till the end of this week including [place] 9/15. Start taking 4 mg (2 tabs) in the morning and 2 mg (1 tab) in the evening starting 9/16 through 9/22.Start taking 2 mg (1 tab ) twice daily starting 9/23 through 9/29.Start taking 2 mg (1 tab) once daily until advised otherwise by your PCP or oncologist, Disp: 63 tablet, Rfl: 0    diphenhydrAMINE (BENADRYL) 25 mg capsule, Take 25 mg by mouth every 12 (twelve) hours as needed for itching, Disp: , Rfl:     folic acid (FOLVITE) 1 mg tablet, Take 1 tablet (1 mg total) by mouth daily, Disp: 30 tablet, Rfl: 6    levETIRAcetam (Keppra) 750 mg tablet, Take 1 tablet (750 mg total) by mouth 2 (two) times a day, Disp: 60 tablet, Rfl: 0    Lidocaine 4 % PTCH, Apply topically 4% applies to buttocks as needed every 8 to 12 hours, Disp: , Rfl:  "    MELATONIN PO, Take 6 mg by mouth daily at bedtime, Disp: , Rfl:     pantoprazole (PROTONIX) 40 mg tablet, TAKE 1 TABLET(40 MG) BY MOUTH DAILY EARLY MORNING, Disp: 30 tablet, Rfl: 5    Probiotic Product (PROBIOTIC DAILY PO), Take 1 capsule by mouth daily, Disp: , Rfl:     Psyllium (METAMUCIL PO), Take by mouth Gummies for constipation, Disp: , Rfl:     vitamin B-12 (VITAMIN B-12) 1,000 mcg tablet, Take 1 tablet (1,000 mcg total) by mouth daily, Disp: 90 tablet, Rfl: 1  No current facility-administered medications for this visit.    Facility-Administered Medications Ordered in Other Visits:     fentaNYL injection, , Intravenous, PRN, Greg Gamble CRNA    midazolam (VERSED) injection, , Intravenous, PRN, Greg Gamble CRNA    Objective   /77 (BP Location: Right arm, Patient Position: Sitting, Cuff Size: Adult)   Pulse 73   Temp 98 °F (36.7 °C) (Temporal)   Resp 18   Ht 5' 2\" (1.575 m)   Wt 54.1 kg (119 lb 3.2 oz)   SpO2 99%   BMI 21.80 kg/m²   Physical Exam  Vitals reviewed.   Constitutional:       General: She is not in acute distress.     Appearance: She is not ill-appearing.   HENT:      Head: Normocephalic and atraumatic.   Eyes:      General: No scleral icterus.        Right eye: No discharge.         Left eye: No discharge.   Cardiovascular:      Rate and Rhythm: Normal rate.   Pulmonary:      Effort: Pulmonary effort is normal. No respiratory distress.      Breath sounds: Normal breath sounds.   Abdominal:      General: Bowel sounds are normal. There is no distension.      Palpations: Abdomen is soft.      Tenderness: There is no abdominal tenderness.   Musculoskeletal:         General: No swelling. Normal range of motion.      Cervical back: Normal range of motion.   Skin:     General: Skin is warm and dry.      Coloration: Skin is not jaundiced or pale.   Neurological:      General: No focal deficit present.      Mental Status: She is alert and oriented to person, place, and time. " Mental status is at baseline.   Psychiatric:         Mood and Affect: Mood normal.         Behavior: Behavior normal.         Thought Content: Thought content normal.         Judgment: Judgment normal.          Recent labs:  Lab Results   Component Value Date/Time    SODIUM 139 09/13/2024 08:22 AM    K 3.9 09/13/2024 08:22 AM    BUN 30 (H) 09/13/2024 08:22 AM    CREATININE 1.12 09/13/2024 08:22 AM    GLUC 130 09/13/2024 08:22 AM    CALCIUM 9.0 09/13/2024 08:22 AM    AST 10 (L) 09/13/2024 08:22 AM    ALT 16 09/13/2024 08:22 AM    ALB 4.0 09/13/2024 08:22 AM    TP 6.6 09/13/2024 08:22 AM    EGFR 48 09/13/2024 08:22 AM     Lab Results   Component Value Date/Time    HGB 10.5 (L) 09/13/2024 08:22 AM    WBC 8.52 09/13/2024 08:22 AM     09/13/2024 08:22 AM    INR 0.88 09/12/2024 08:58 AM    PTT 25 09/09/2024 11:48 AM     Lab Results   Component Value Date/Time    THB1GZBJURPX 0.403 (L) 09/13/2024 08:22 AM       Recent Imaging:  Procedure: IR biopsy other    Result Date: 9/13/2024  Narrative: PROCEDURE: 1.  Ultrasound-guided right gluteal mass biopsy 2.  Fluoroscopic-guided diagnostic lumbar puncture STAFF: Greg Birch M.D. NUMBER OF IMAGES: Multiple. COMPLICATIONS: None. MEDICATIONS: Moderate sedation was monitored by radiology nursing staff.  Monitoring of conscious sedation totaled 15 minutes. INDICATION: Metastatic lung cancer. New right gluteal mass. 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 PROCEDURE: Under real-time ultrasound guidance, a 17-gauge coaxial needle was advanced into the right gluteal mass. Under ultrasound guidance, a total of 3 2.3 cm. core biopsies were obtained. The tract with embolized with Gelfoam, and the coaxial needle was removed. Next, a suitable location for puncture was identified with fluoroscopy in the prone position at the L2-3 level. A 20-gauge spinal needle was advanced into the subarachnoid space using fluoroscopic guidance.  An opening pressure was measured.  16 mL's of  clear cerebrospinal fluid was then removed and sent for analysis.  The needle was removed and the puncture site was covered with a sterile dressing. FINDINGS: 1.  Pre-procedural ultrasound showed the approximately 3 cm superficial right gluteal mass. 2.  Intra-procedural ultrasound confirmed good positioning of the biopsy needle within the mass. 3.  Samples were sent in formalin. 4.  Post-procedural ultrasound showed no immediate complication. 5.  Fluoroscopy confirmed good positioning within the subarachnoid space, with prompt return of cerebrospinal fluid. 6.  Opening pressure was measured at 15 cm H20.     Impression: 1.  Ultrasound-guided right gluteal mass biopsy. 2.  Fluoroscopic-guided lumbar puncture. Workstation performed: [id number]     Procedure: IR lumbar puncture    Result Date: 9/13/2024  Narrative: PROCEDURE: 1.  Ultrasound-guided right gluteal mass biopsy 2.  Fluoroscopic-guided diagnostic lumbar puncture STAFF: Greg Birch M.D. NUMBER OF IMAGES: Multiple. COMPLICATIONS: None. MEDICATIONS: Moderate sedation was monitored by radiology nursing staff.  Monitoring of conscious sedation totaled 15 minutes. INDICATION: Metastatic lung cancer. New right gluteal mass. 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 PROCEDURE: Under real-time ultrasound guidance, a 17-gauge coaxial needle was advanced into the right gluteal mass. Under ultrasound guidance, a total of 3 2.3 cm. core biopsies were obtained. The tract with embolized with Gelfoam, and the coaxial needle was removed. Next, a suitable location for puncture was identified with fluoroscopy in the prone position at the L2-3 level. A 20-gauge spinal needle was advanced into the subarachnoid space using fluoroscopic guidance.  An opening pressure was measured.  16 mL's of clear cerebrospinal fluid was then removed and sent for analysis.  The needle was removed and the puncture site was covered with a sterile dressing. FINDINGS: 1.  Pre-procedural  ultrasound showed the approximately 3 cm superficial right gluteal mass. 2.  Intra-procedural ultrasound confirmed good positioning of the biopsy needle within the mass. 3.  Samples were sent in formalin. 4.  Post-procedural ultrasound showed no immediate complication. 5.  Fluoroscopy confirmed good positioning within the subarachnoid space, with prompt return of cerebrospinal fluid. 6.  Opening pressure was measured at 15 cm H20.     Impression: 1.  Ultrasound-guided right gluteal mass biopsy. 2.  Fluoroscopic-guided lumbar puncture. Workstation performed: [id number]     Procedure: MRI Brain BT w wo Contrast    Result Date: 9/11/2024  Narrative: MRI BRAIN WITH AND WITHOUT CONTRAST INDICATION: mets. COMPARISON: CTA head and neck with and without contrast 9/9/2024. CT head without contrast 8/21/2024. MRI brain with and without contrast 7/30/2024, 3/28/2024. MRI brain without contrast 2/10/2015. TECHNIQUE: Multiplanar, multisequence imaging of the brain and sella was performed before and after gadolinium administration. IV Contrast:  10 mL of Gadobutrol injection (SINGLE-DOSE) IMAGE QUALITY:   Diagnostic. FINDINGS: BRAIN PARENCHYMA: Multiple enhancing metastatic lesions. For reference (measured on long axis): - Tiny left anterolateral frontal lobe lesion (13:14), previously 0.3 cm. - 0.6 cm left paramedian parietal lobe lesion (13:97), unchanged. - 0.4 cm left paramedian frontal lobe lesion (13:93), previously 0.6 cm ring-enhancing lesion. - 2.2 cm left occipital lobe lesion (13:74), unchanged. - 0.3 cm left temporal lobe lesion (13:53), new. - 0.4 cm cerebellar vermis ring-enhancing lesion (13:39), previously 0.5 cm. Similar nearly symmetric enhancement in bilateral cranial nerve VII in both internal auditory canals, suspicious for leptomeningeal metastasis. Persistent diffuse vasogenic edema in left parietal, left occipital, and left posterior temporal lobes with mild mass effect on posterior aspect of left lateral  ventricle, similar to recent CT heads but worsened since MRI brain 7/30/2024. No midline shift. No acute intracranial hemorrhage. No diffusion weighted signal abnormality to suggest acute infarction. No abnormal hyperperfusion. Small scattered hyperintensities on T2/FLAIR imaging are noted in the periventricular and subcortical white matter demonstrating an appearance that is statistically most likely to represent mild microangiopathic change. VENTRICLES: Mild mass effect on posterior aspect of the left lateral ventricle. No obstructive hydrocephalus. No acute intraventricular hemorrhage. SELLA AND PITUITARY GLAND:  Normal. ORBITS:  Normal. PARANASAL SINUSES:  Normal. VASCULATURE:  Evaluation of the major intracranial vasculature demonstrates appropriate flow voids. CALVARIUM AND SKULL BASE:  Normal. EXTRACRANIAL SOFT TISSUES:  Normal.     Impression: Mixed treatment response. - New tiny left temporal lobe metastatic lesion. - Unchanged left paramedian parietal lobe and left occipital lobe metastatic lesions - largest in left occipital lobe. - Unchanged nearly symmetric enhancement along the course of bilateral cranial nerve VII in both internal auditory canals, suspicious for leptomeningeal metastasis. Differential includes bilateral Bell's palsy. - Slightly decreased size of left anterolateral frontal lobe, left paramedian frontal lobe, and cerebellar vermis metastatic lesions. - Persistent diffuse vasogenic edema in left parietal, left occipital, and left posterior temporal lobes with mild mass effect on posterior aspect of left lateral ventricle, similar to recent CT heads but worsened since MRI brain 7/30/2024. The study was marked in EPIC for immediate notification. Workstation performed: [id number]     Procedure: CTA head and neck with and without contrast    Result Date: 9/9/2024  Narrative: CTA NECK AND BRAIN WITH AND WITHOUT CONTRAST INDICATION: Known brain mets, confusion/disorientation over the past 2  days, known visual field deficits in right inferior visual field, RUE finger to nose abnormal COMPARISON:   CT brain August 21, 2024. Prior CT angiogram July 29, 2024. TECHNIQUE:  Routine CT imaging of the Brain without contrast.Post contrast imaging was performed after administration of iodinated contrast through the neck and brain. Post contrast axial 0.625 mm images timed to opacify the arterial system.  3D rendering was performed on an independent workstation.   MIP reconstructions performed. Coronal and sagittal reconstructions were performed of the non contrast portion of the brain. Radiation dose length product (DLP) for this visit:  2108 mGy-cm .  This examination, like all CT scans performed in the [facility], was performed utilizing techniques to minimize radiation dose exposure, including the use of iterative reconstruction and automated exposure control. IV Contrast:  85 mL of iohexol (OMNIPAQUE) IMAGE QUALITY:   Diagnostic FINDINGS: NONCONTRAST BRAIN PARENCHYMA: Vasogenic edema identified in the left parietal and occipital lobes similar to the prior study. This is secondary to known metastatic disease. Mass effect on the posterior horn of left lateral ventricle noted. Low-density in periventricular white matter compatible with mild chronic microangiopathic change. VENTRICLES AND EXTRA-AXIAL SPACES: As above. No hydrocephalus. VISUALIZED ORBITS: Normal. PARANASAL SINUSES: Normal. CTA NECK ARCH AND GREAT VESSELS: Visualized arch and great vessels are normal. VERTEBRAL ARTERIES: Patent extracranial segments. RIGHT CAROTID: No stenosis.    No dissection. LEFT CAROTID: No stenosis.    No dissection. NASCET criteria was used to determine the degree of internal carotid artery diameter stenosis. CTA BRAIN: INTERNAL CAROTID ARTERIES: No stenosis or occlusion. ANTERIOR CEREBRAL ARTERY CIRCULATION:  No stenosis or occlusion. MIDDLE CEREBRAL ARTERY CIRCULATION:  No stenosis or occlusion. DISTAL  VERTEBRAL ARTERIES:  No stenosis or occlusion. BASILAR ARTERY:  No stenosis or occlusion. POSTERIOR CEREBRAL ARTERIES: No stenosis or occlusion. VENOUS STRUCTURES:  Normal. NON VASCULAR ANATOMY BONY STRUCTURES:  No acute osseous abnormality. SOFT TISSUES OF THE NECK:  Normal. THORACIC INLET: Right upper lobe lung mass known to represent bronchogenic carcinoma.     Impression: CT Brain: Vasogenic edema left parietal and left occipital lobes similar to prior study in attributed to history of brain metastasis. Mass effect on the posterior aspect of the left lateral ventricle unchanged. CT Angiography:  Unremarkable CTA neck and brain. Workstation performed: [id number]     Procedure: MRI pelvis bony wo and w contrast    Result Date: 9/3/2024  Narrative: MRI BONY PELVIS WITH AND WITHOUT CONTRAST INDICATION:   C34.91: Malignant neoplasm of unspecified part of right bronchus or lung. FDG avid lesion in the right ischio anal fat seen on prior PET/CT. Follow-up for further characterization. Patient has history of non-small cell lung carcinoma, metastatic to mediastinal and hilar lymph nodes. COMPARISON: PET/CT dated August 5, 2024, CT chest abdomen pelvis dated July 3, 2024. TECHNIQUE:  Multiplanar/multisequence MR was obtained of the entire pelvis both pre and post IV contrast. IV Contrast:  4 mL of Gadobutrol injection (SINGLE-DOSE) FINDINGS: SUBCUTANEOUS TISSUES: Normal PELVIC SOFT TISSUES: Soft tissue mass in the right ischioanal fat measuring 3.8 x 2.6 x 3.4 cm (5/33, 2/11), corresponding to the FDG avid lesion seen on prior PET/CT. It has hyperintense T2 weighted and hypointense T1-weighted signal with thick nodular and irregular enhancement predominantly along its periphery with hypoenhancement at the center. Lesion size and has increased in size from prior PET CT dated 8/5/2024 at which time it measured 2.7 x 2.0 cm and CT study dated 7/3/2024 at which time it measured 0.9 x 0.7 cm. Overall findings are  suspicious for a malignant lesion, likely metastasis from patient's cancer. BONES:  No fracture, dislocation or destructive osseous lesion. ARTICULAR SURFACES:  Normal. VISUALIZED MUSCULATURE:  Unremarkable.     Impression: Enhancing soft tissue lesion in the right ischioanal fat measuring 3.8 x 2.6 x 3.4 cm (FDG avid on prior PET/CT), increased in size from 8/5/2024 and 7/3/2024. Findings are suspicious for a malignant lesion, likely a metastasis from patient's lung cancer. Consider definitive characterization with tissue sampling. Workstation performed: [id number]     Procedure: XR elbow 3+ vw left    Result Date: 8/27/2024  Narrative: [id number]    Procedure: XR ankle 3+ vw right    Result Date: 8/27/2024  Narrative: [id number]    Procedure: CT head without contrast    Result Date: 8/21/2024  Narrative: CT BRAIN - WITHOUT CONTRAST INDICATION:   History of lung cancer with metastatic brain lesions, dysmetria. COMPARISON: Head CT from 8/5/2024, prior brain MR from 7/30/2024, and prior head CT from 7/29/2024 TECHNIQUE:  CT examination of the brain was performed.  Multiplanar 2D reformatted images were created from the source data. Radiation dose length product (DLP) for this visit:  1003 mGy-cm .  This examination, like all CT scans performed in the [facility], was performed utilizing techniques to minimize radiation dose exposure, including the use of iterative reconstruction and automated exposure control. IMAGE QUALITY:  Diagnostic. FINDINGS: PARENCHYMA: There is worsening vasogenic edema involving the left parietal, and occipital lobes, related to the left occipital enhancing metastatic lesion better depicted on prior brain MRI. There is also worsening low attenuating vasogenic edema involving the subcortical parafalcine left frontal lobe, corresponding to the metastatic lesion in this region identified on prior brain  MRI. No associated intraparenchymal hemorrhage is noted. No midline shift is noted. There are additional periventricular white matter low-attenuation areas involving both cerebral hemispheres, which statistically like represent mild chronic microangiopathic changes. VENTRICLES AND EXTRA-AXIAL SPACES: There is mass effect on the posterior body and trigone of the left lateral ventricle due to surrounding vasogenic edema. VISUALIZED ORBITS: Normal visualized orbits. PARANASAL SINUSES: Normal visualized paranasal sinuses. CALVARIUM AND EXTRACRANIAL SOFT TISSUES:  Normal.     Impression: 1. Worsening vasogenic edema in particular involving the left parietal and occipital lobes, corresponding to worsening vasogenic edema surrounding the patient's known left occipital lobe metastasis as better depicted on prior brain MRI. This results in mass effect on the posterior body and trigone of left lateral ventricle.. 2. Worsening vasogenic edema involving the parafalcine left frontal lobe at the site of the patient's known intracranial metastasis in this region as seen on prior brain MRI. 3. No intracranial hemorrhage noted. No midline shift. The study was marked in EPIC for immediate notification. Workstation performed: [id number]     Procedure: NM PET CT skull base to mid thigh    Result Date: 8/5/2024  Narrative: PET/CT SCAN INDICATION: C34.91: Malignant neoplasm of unspecified part of right bronchus or lung C76.1: Malignant neoplasm of thorax, restaging MODIFIER: PS COMPARISON: PET/CT dated 3/22/2024, brain MRI dated 7/30/2024, CT of chest, abdomen, and pelvis dated 7/3/2024 CELL TYPE:  metastatic non-small cell carcinoma (bx: 3/26/24 4R, 10R, 4L LNs +) TECHNIQUE:   8.7 mCi F-18-FDG administered IV. Multiplanar attenuation corrected and non attenuation corrected PET images are available for interpretation, and contiguous, low dose, axial CT sections were obtained from the skull vertex through the femurs. Intravenous contrast  material was not utilized. This examination, like all CT scans performed in the [facility], was performed utilizing techniques to minimize radiation dose exposure, including the use of iterative reconstruction and automated exposure control. Fasting serum glucose: 78 mg/dl FINDINGS: VISUALIZED BRAIN: Focal FDG activity involving the left occipital lobe on PET image 53 with max SUV of 11.0 corresponding to patient's known metastatic lesion which was better characterized on MRI dated 7/30/2024; this finding is associated with adjacent regional photopenia which could be related to underlying vasogenic edema seen on MRI. Patient's known additional metastatic lesions seen on recent MRI are not definitively visualized on current PET/CT and could be obscured by normal background physiologic brain activity. HEAD/NECK: There is a physiologic distribution of FDG. No FDG avid cervical adenopathy is seen. CT images: Intracranial atherosclerotic calcification is noted. CHEST: Persistent hypermetabolic medial right upper lobe lung mass measuring 3.3 x 2.7 cm on image 132 of series 3 with max SUV of 10.0, previously measuring 3.7 x 2.9 cm with max SUV of 18.0 when utilizing similar measurement technique. On image 132 of series 3 there is a stable adjacent 7 mm right upper lobe lung nodule with max SUV of 2.1, previously similar in size with max SUV of 1.7 Improving hypermetabolic right paratracheal mediastinal and right hilar yaw metastatic disease. For example, on image 137, hypermetabolic right paratracheal lymph node measures 7 mm in short axis with max SUV of 3.7, previously approximately 1.3 cm with max SUV of 13.0. Stable mildly FDG avid semisolid groundglass nodular opacity involving the left upper lobe measuring 2.6 x 1.1 cm on image 142 of series 3 with max SUV of 1.9, previously similar in size with max SUV of 2.2. CT images: Atherosclerotic vascular calcifications including those of the coronary  arteries are noted. Emphysematous changes. ABDOMEN: No FDG avid soft tissue lesions are seen. CT images: Atherosclerotic vascular calcifications are noted. Tiny nonobstructing right renal calculus. PELVIS: On image 272 series 3 there is a new hypermetabolic soft tissue mass involving the fat in the right anorectal region medial to the right ischial tuberosity measuring 2.7 x 2.0 cm with max SUV of 9.9; this finding seems to be in an atypical distribution from patient's primary bronchogenic carcinoma and could reflect a separate malignant process. Further evaluation with tissue sampling is recommended for further characterization as clinically indicated. This finding has significantly increased in size since 7/3/2024 where it measured 1.0 cm. CT images: Unremarkable. OSSEOUS STRUCTURES: No FDG avid lesions are seen. CT images: Possible osteopenia. Degenerative changes are noted involving the spine.     Impression: 1. New hypermetabolic soft tissue mass involving the right anorectal fat medial to the right ischial tuberosity. While this finding could reflect hypermetabolic metastatic disease related to patient's primary bronchogenic carcinoma the distribution is atypical for metastatic disease from bronchogenic carcinoma. Therefore, consider further evaluation with tissue sampling to exclude secondary hypermetabolic malignancy. 2. Persistent hypermetabolic right upper lobe bronchogenic carcinoma with hypermetabolic mediastinal and right hilar yaw metastatic disease which overall is improved since prior PET/CT. 3. Hypermetabolic left occipital lobe metastatic disease. Patient's known intracranial metastatic disease was better characterized on prior MRI of the brain. 4. Stable mildly FDG avid left upper lobe groundglass nodule suspicious for malignancy of low metabolic activity such as bronchoalveolar carcinoma. Please see above for details and additional findings. The study was marked in EPIC for significant  notification. Workstation performed: [id number]     Procedure: EEG awake or drowsy routine    Result Date: 2024  Narrative: Table formatting from the original result was not included. ELECTROENCEPHALOGRAM (EEG) Patient Name:  Ayla Nye  MRN: [id number] :  1950 File #: [id number] Age: 73 y.o.  Date performed: 24        Report date: 2024      Study type: Routine EEG ICD 10 diagnosis: Transient neurological symptoms R29.818 Start time: 8:48 End time: 9:21 --------------------------------------------------------------------------- ---------------------------------------- Patient History: This recording was observed in a 73 y.o. female w/ metastatic NSCLC and multifocal brain metastases (including lefto frontal and occipital lobe) to determine whether spells of focal right motor and right hemianopia are seizures. Medications include: levetiracetam, dexamethasone --------------------------------------------------------------------------- ---------------------------------------- Description of Procedure: 32 channel digital recording with electrodes placed according to the International 10-20 system with additional T1/T2 electrodes, EOG, EKG, and simultaneous video. The recording was technically satisfactory. --------------------------------------------------------------------------- ---------------------------------------- EEG Description: The recording was performed with the patient awake and drowsy. She was fully oriented. During wakefulness, there were long runs of well regulated, low amplitude, posteriorly dominant, asymmetric, 9.5-10.5 cps alpha rhythm that attenuated with eye opening which was better defined, higher amplitude and slightly faster on the right. There were symmetric low amplitude, frontally dominant beta activities. There were intermittent, very brief (1-2 seconds), left occipitally predominant, low to medium amplitude mixed frequency, polymorphic 5.5-8 cps theta and 2-3  cps delta activities. With drowsiness, alpha activity attenuated and was replaced by diffusely distributed theta activities. Deeper sleep was not captured. --------------------------------------------------------------------------- ---------------------------------------- Activation Procedures: Hyperventilation was not performed. Stepped photic stimulation between 1-30 cps was performed and induced bilateral photic driving. Other findings: The single lead ECG demonstrated regular rhythm --------------------------------------------------------------------------- ---------------------------------------- EEG Interpretation: This Routine EEG recorded during wakefulness and drowsiness is abnormal. Intermittent, left occipital predominant, mixed theta and delta slowing suggests left occipital focal neuronal dysfunction. Yulissa Eddy MD Clinical Neurophysiology Fellow [facility] Loco Crooks MD Attending Epileptologist [facility]    Procedure: Echo complete w/ contrast if indicated    Result Date: 7/30/2024  Narrative:   Left Ventricle: Left ventricular cavity size is normal. Wall thickness is normal. The left ventricular ejection fraction is 60%. Systolic function is normal. Wall motion is normal. Diastolic function is normal.   Right Ventricle: Right ventricular cavity size is mildly dilated. Systolic function is normal.   Atrial Septum: There is a patent foramen ovale.  Bidirectional shunting with bubbles visualized in the LV.   Mitral Valve: There is mild annular calcification.   Tricuspid Valve: There is mild regurgitation. The estimated right ventricular systolic pressure is 29.00 mmHg.     Procedure: MRI brain w wo contrast    Result Date: 7/30/2024  Narrative: MRI BRAIN WITH AND WITHOUT CONTRAST INDICATION: Dizziness. COMPARISON: CT head 7/29/2024. TECHNIQUE: Multiplanar, multisequence imaging of the brain was performed  before and after gadolinium administration. IV Contrast:  5 mL of Gadobutrol injection (SINGLE-DOSE) IMAGE QUALITY:   Diagnostic. FINDINGS: BRAIN PARENCHYMA: No acute infarct or midline shift. Multiple enhancing lesions are seen supratentorially and infratentorially, with associated vasogenic edema. Lesions as referenced below: 1. 2.0 x 1.4 cm lesion in the left occipital lobe on series 10, image 16 with adjacent vasogenic edema involving the left occipital lobe and extending into the left periatrial white matter, where there is mild mass effect on the adjacent lateral ventricle. There is an internal, diffusion restricting 8 mm component. 2. 4 mm enhancing lesion in the left paramedian frontal lobe on series 10, image 20 with mild adjacent mass effect and vasogenic edema and a small focus of susceptibility likely reflective of hemorrhage. 3. 3 mm lesion in the left paramedian parietal lobe on series 10, image 20 with mild mass effect and vasogenic edema. 4. 3 mm lesion in the midline cerebellum on series 10, image 9 with adjacent vasogenic edema which results in mild mass effect on the fourth ventricle. 5. 2 mm lesion in the left frontal lobe (series 11, image 129) VENTRICLES:  Normal for the patient's age. SELLA AND PITUITARY GLAND:  Normal. ORBITS: No acute abnormality. PARANASAL SINUSES: Essentially clear. VASCULATURE:  Evaluation of the major intracranial vasculature demonstrates appropriate flow voids. CALVARIUM AND SKULL BASE: No acute abnormality. EXTRACRANIAL SOFT TISSUES: No acute abnormality.     Impression: 1. Multiple supratentorial and infratentorial enhancing lesions with associated vasogenic edema, the largest of which is in the left occipital lobe. Findings are most consistent with metastases. 2. No acute infarct or midline shift. The study was marked in EPIC for immediate notification. Workstation performed: [id number]     Procedure: CT stroke alert brain    Result Date: 7/29/2024  Narrative: CT  BRAIN - STROKE ALERT PROTOCOL INDICATION:   Stroke Alert. COMPARISON: 3/28/2024. TECHNIQUE:  CT examination of the brain was performed.  In addition to axial images, coronal reformatted images were created and submitted for interpretation. Radiation dose length product (DLP) for this visit:  912 mGy-cm .  This examination, like all CT scans performed in the [facility], was performed utilizing techniques to minimize radiation dose exposure, including the use of iterative reconstruction and automated exposure control. IMAGE QUALITY:  Diagnostic. FINDINGS: PARENCHYMA: Area of hypoattenuation in the left superior frontal region adjacent to the interhemispheric fissure with preservation of the cortex, possibly indicating vasogenic edema.. Periventricular and subcortical hypoattenuating foci consistent with microangiopathic disease. No acute intracranial hemorrhage or mass effect. VENTRICLES AND EXTRA-AXIAL SPACES: No hydrocephalus or extra-axial collection. VISUALIZED ORBITS: Intact. PARANASAL SINUSES: Clear. CALVARIUM AND EXTRACRANIAL SOFT TISSUES:   No lytic or blastic lesion or fracture.     Impression: 1. Small area of hypoattenuation in the left superior frontal lobe and larger area in the left posterior parietal region, suggestive of vasogenic edema. Findings are concerning for occult underlying metastases. Acute infarcts cannot be excluded however infarcts in 2 vascular territories and with findings suggesting subacute infarcts, not agreement with the clinical timeline suggest stroke is less likely. 2. No intracranial hemorrhage. Findings were directly discussed with Percy Montalvo  at    8:20 PM . Workstation performed: [id number]     Procedure: CTA stroke alert (head/neck)    Result Date: 7/29/2024  Narrative: CTA NECK AND BRAIN WITH AND WITHOUT CONTRAST INDICATION: Stroke Alert COMPARISON: 3/28/2024 TECHNIQUE:  Post contrast imaging was performed after administration of iodinated contrast through the  neck and brain. Post contrast axial 0.625 mm images timed to opacify the arterial system.  3D rendering was performed on an independent workstation.   MIP reconstructions performed. Coronal and sagittal reconstructions were performed of the non contrast portion of the brain. Radiation dose length product (DLP) for this visit:  338 mGy-cm .  This examination, like all CT scans performed in the [facility], was performed utilizing techniques to minimize radiation dose exposure, including the use of iterative reconstruction and automated exposure control. IV Contrast:  85 mL of iohexol (OMNIPAQUE) IMAGE QUALITY:   Diagnostic FINDINGS: CTA NECK ARCH AND GREAT VESSELS: Mild atherosclerotic plaque in the aortic arch. No stenosis in the subclavian arteries. VERTEBRAL ARTERIES: Left vertebral artery arises directly from the aortic arch. No stenosis. RIGHT CAROTID: No stenosis.    No dissection. LEFT CAROTID: No stenosis.    No dissection. NASCET criteria was used to determine the degree of internal carotid artery diameter stenosis. CTA BRAIN: INTERNAL CAROTID ARTERIES: Minimal calcified plaque in the carotid siphons without hemodynamically significant stenosis. ANTERIOR CEREBRAL ARTERY CIRCULATION:  No stenosis or occlusion. MIDDLE CEREBRAL ARTERY CIRCULATION:  No stenosis or occlusion. DISTAL VERTEBRAL ARTERIES:  No stenosis or occlusion. BASILAR ARTERY:  No stenosis or occlusion. POSTERIOR CEREBRAL ARTERIES: No stenosis or occlusion. VENOUS STRUCTURES: Patent dural venous sinuses. NON VASCULAR ANATOMY BONY STRUCTURES:  No acute osseous abnormality. SOFT TISSUES OF THE NECK: Subcentimeter cystic lesions in the thyroid. THORACIC INLET: Partially visualized mediastinal lymphadenopathy, stable. Partially visualized right upper lobe mass, not significantly changed.     Impression: No hemodynamically significant stenosis, dissection or occlusion of the carotid or vertebral arteries or major vessels of the [place].. Findings were directly discussed with Percy Montalvo  at  8:29 PM  . Workstation performed: [id number]     Procedure: XR chest 2 views    Result Date: 7/3/2024  Narrative: XR CHEST PA & LATERAL DUAL ENERGY SUBTRACTION. INDICATION:  fever, hx of lung ca. COMPARISON: Chest CT 7/3/2024, CXR 6/22/2024, PET CT 3/22/2024. FINDINGS: No acute disease. Redemonstration of right upper lobe paramediastinal mass. No pneumothorax or pleural effusion. Unremarkable cardiomediastinal silhouette. Bones are unremarkable for age. Unremarkable upper abdomen.     Impression: No acute cardiopulmonary disease. Redemonstration of right upper lobe paramediastinal mass. Workstation performed: [id number]     Procedure: CT chest abdomen pelvis w contrast    Result Date: 7/3/2024  Narrative: CT CHEST, ABDOMEN AND PELVIS WITH IV CONTRAST INDICATION: hx of lung cancer, fever, cough. Fever (102.9 degrees Fahrenheit), cough, recently had chemotherapy. COMPARISON: February 28, 2024 TECHNIQUE: CT examination of the chest, abdomen and pelvis was performed. Multiplanar 2D reformatted images were created from the source data. This examination, like all CT scans performed in the [facility], was performed utilizing techniques to minimize radiation dose exposure, including the use of iterative reconstruction and automated exposure control. Radiation dose length product (DLP) for this visit: 314 mGy-cm IV Contrast: 100 mL of iohexol (OMNIPAQUE) Enteric Contrast: Not administered. FINDINGS: CHEST LUNGS: There is an approximately 4.6 x 4.8 x 3.3 cm mass within the anteromedial aspect of the right upper lobe of the lung. This mass abuts the anterior and anteromedial pleural surface and the right hand aspect of the upper mediastinum. The mass demonstrates peripheral spiculation and areas of low density centrally, suggesting areas of necrosis. Just posterolateral to this mass there is a spiculated satellite lesion measuring approximately 1.6  cm. There is also a spiculated satellite lesion anterior and inferior to the dominant mass, measuring approximately 1.4 cm and abutting the anterior pleural surface (series 302 image 91); this satellite mass appears new compared with February 28, 2024. An ill-defined groundglass opacity in the left upper lobe of the lung measures approximately 2.5 cm, similar to the prior study. There is mild to moderate emphysema. There is mild bibasilar scarring versus atelectasis. Right apical scarring unchanged. PLEURA: Left pleural calcifications unchanged. No pleural effusions. No pneumothorax. HEART/GREAT VESSELS: Coronary artery disease. There is atherosclerosis of the thoracic aorta. No thoracic aortic aneurysm. MEDIASTINUM AND SUDEEP: There is a 2.4 x 2.2 cm right paratracheal node demonstrating low density centrally suggesting necrosis; this is larger compared with February 28, 2024. Additionally, there is a 2.8 x 2 cm precarinal node demonstrating low density centrally suggesting necrosis, also larger compared to the prior study. Necrotic appearing right hilar adenopathy measuring up to 2 cm. CHEST WALL AND LOWER NECK: Tiny thyroid nodules, measuring 6 mm or less. Incidental discovery of one or more thyroid nodule(s) measuring less than 1.5 cm and without suspicious features is noted in this patient who is above 35 years old; according to guidelines published in the February 2015 white paper on incidental thyroid nodules in the Journal of the American College of Radiology (JACR), no further evaluation is recommended. ABDOMEN LIVER/BILIARY TREE: The liver is mildly enlarged, measuring approximately 19 cm craniocaudal dimension. There is mild hepatic steatosis. GALLBLADDER: No calcified gallstones. No pericholecystic inflammatory change. SPLEEN: Unremarkable. PANCREAS: Unremarkable. ADRENAL GLANDS: Unremarkable. KIDNEYS/URETERS: Small cysts and subcentimeter hypodensities too small to characterize. There is a 2 mm  nonobstructing calculus in the upper pole right kidney. No hydronephrosis bilaterally. STOMACH AND BOWEL: No bowel obstruction. There is mild colonic diverticulosis without evidence of acute diverticulitis. APPENDIX: No findings to suggest appendicitis; reportedly, the patient has undergone prior appendectomy. ABDOMINOPELVIC CAVITY: No ascites. No pneumoperitoneum. No lymphadenopathy. VESSELS: Atherosclerosis. No abdominal aortic aneurysm. PELVIS REPRODUCTIVE ORGANS: Unremarkable for patient's age. URINARY BLADDER: Unremarkable. ABDOMINAL WALL/INGUINAL REGIONS: Unremarkable. BONES: No acute fracture or suspicious osseous lesion. Spinal degenerative changes.     Impression: There has been interval progression of neoplastic disease, as described above. Please see discussion. Mild hepatomegaly. Mild hepatic steatosis. Mild colonic diverticulosis without evidence of acute diverticulitis. No bowel obstruction. Atherosclerosis. Coronary artery disease. Other nonemergent findings as above. Workstation performed: [id number]     Procedure: XR chest portable    Result Date: 6/22/2024  Narrative: XR CHEST PORTABLE INDICATION: fever. COMPARISON: PET/CT 3/22/2024, chest CT 2/28/2024, CXR 7/16/2019. FINDINGS: Redemonstration of 4 cm right upper lobe mass with no acute disease. No pneumothorax or pleural effusion. Normal cardiomediastinal silhouette. Bones are unremarkable for age. Normal upper abdomen.     Impression: No acute cardiopulmonary disease. Redemonstration of 4 cm right upper lobe mass. Workstation performed: [id number]      45 minutes total time spent on 10/2/2024 in caring for this patient including obtaining/reviewing history, symptom assessment and management, medication review / adjustment, psychosocial support, reviewing / ordering tests, medicines, imaging, procedures, supportive listening, anticipatory guidance, and documenting in the medical record. All of the patient's questions were answered during this  "discussion.    ZULEYMA Pollard  [facility]  [phone]    Portions of this document may have been created using dictation software and as such some \"sound alike\" terms may have been generated by the system. Do not hesitate to contact me with any questions or clarifications.     "

## 2024-10-03 ENCOUNTER — APPOINTMENT (OUTPATIENT)
Dept: RADIATION ONCOLOGY | Facility: HOSPITAL | Age: 74
End: 2024-10-03
Attending: INTERNAL MEDICINE
Payer: MEDICARE

## 2024-10-03 ENCOUNTER — APPOINTMENT (OUTPATIENT)
Dept: LAB | Facility: CLINIC | Age: 74
End: 2024-10-03
Payer: MEDICARE

## 2024-10-03 DIAGNOSIS — R56.9 SEIZURE (HCC): ICD-10-CM

## 2024-10-03 DIAGNOSIS — E03.9 HYPOTHYROIDISM, UNSPECIFIED TYPE: ICD-10-CM

## 2024-10-03 DIAGNOSIS — C34.11 MALIGNANT NEOPLASM OF UPPER LOBE, RIGHT BRONCHUS OR LUNG (HCC): ICD-10-CM

## 2024-10-03 LAB
ALBUMIN SERPL BCG-MCNC: 3.7 G/DL (ref 3.5–5)
ALP SERPL-CCNC: 34 U/L (ref 34–104)
ALT SERPL W P-5'-P-CCNC: 12 U/L (ref 7–52)
ANION GAP SERPL CALCULATED.3IONS-SCNC: 5 MMOL/L (ref 4–13)
AST SERPL W P-5'-P-CCNC: 12 U/L (ref 13–39)
BASOPHILS # BLD MANUAL: 0 THOUSAND/UL (ref 0–0.1)
BASOPHILS NFR MAR MANUAL: 0 % (ref 0–1)
BILIRUB SERPL-MCNC: 0.3 MG/DL (ref 0.2–1)
BUN SERPL-MCNC: 25 MG/DL (ref 5–25)
CALCIUM SERPL-MCNC: 8.8 MG/DL (ref 8.4–10.2)
CHLORIDE SERPL-SCNC: 106 MMOL/L (ref 96–108)
CO2 SERPL-SCNC: 28 MMOL/L (ref 21–32)
CREAT SERPL-MCNC: 0.87 MG/DL (ref 0.6–1.3)
CRP SERPL QL: 35.1 MG/L
EOSINOPHIL # BLD MANUAL: 0 THOUSAND/UL (ref 0–0.4)
EOSINOPHIL NFR BLD MANUAL: 0 % (ref 0–6)
ERYTHROCYTE [DISTWIDTH] IN BLOOD BY AUTOMATED COUNT: 14.9 % (ref 11.6–15.1)
ERYTHROCYTE [SEDIMENTATION RATE] IN BLOOD: 32 MM/HOUR (ref 0–29)
GFR SERPL CREATININE-BSD FRML MDRD: 66 ML/MIN/1.73SQ M
GLUCOSE P FAST SERPL-MCNC: 100 MG/DL (ref 65–99)
HCT VFR BLD AUTO: 32.6 % (ref 34.8–46.1)
HGB BLD-MCNC: 10.2 G/DL (ref 11.5–15.4)
LDH SERPL-CCNC: 461 U/L (ref 140–271)
LEVETIRACETAM SERPL-MCNC: 38 UG/ML (ref 12–46)
LIPASE SERPL-CCNC: 39 U/L (ref 11–82)
LYMPHOCYTES # BLD AUTO: 0.16 THOUSAND/UL (ref 0.6–4.47)
LYMPHOCYTES # BLD AUTO: 2 % (ref 14–44)
MCH RBC QN AUTO: 32.5 PG (ref 26.8–34.3)
MCHC RBC AUTO-ENTMCNC: 31.3 G/DL (ref 31.4–37.4)
MCV RBC AUTO: 104 FL (ref 82–98)
MONOCYTES # BLD AUTO: 0.73 THOUSAND/UL (ref 0–1.22)
MONOCYTES NFR BLD: 9 % (ref 4–12)
NEUTROPHILS # BLD MANUAL: 7.17 THOUSAND/UL (ref 1.85–7.62)
NEUTS BAND NFR BLD MANUAL: 1 % (ref 0–8)
NEUTS SEG NFR BLD AUTO: 88 % (ref 43–75)
PLATELET # BLD AUTO: 220 THOUSANDS/UL (ref 149–390)
PLATELET BLD QL SMEAR: ADEQUATE
PMV BLD AUTO: 8.9 FL (ref 8.9–12.7)
POTASSIUM SERPL-SCNC: 4.5 MMOL/L (ref 3.5–5.3)
PROT SERPL-MCNC: 6.4 G/DL (ref 6.4–8.4)
RBC # BLD AUTO: 3.14 MILLION/UL (ref 3.81–5.12)
RBC MORPH BLD: NORMAL
SODIUM SERPL-SCNC: 139 MMOL/L (ref 135–147)
T3FREE SERPL-MCNC: 2.49 PG/ML (ref 2.5–3.9)
TSH SERPL DL<=0.05 MIU/L-ACNC: 2.67 UIU/ML (ref 0.45–4.5)
WBC # BLD AUTO: 8.06 THOUSAND/UL (ref 4.31–10.16)

## 2024-10-03 PROCEDURE — 77014 CHG CT GUIDANCE RADIATION THERAPY FLDS PLACEMENT: CPT | Performed by: INTERNAL MEDICINE

## 2024-10-03 PROCEDURE — 85027 COMPLETE CBC AUTOMATED: CPT

## 2024-10-03 PROCEDURE — 83690 ASSAY OF LIPASE: CPT

## 2024-10-03 PROCEDURE — 85652 RBC SED RATE AUTOMATED: CPT

## 2024-10-03 PROCEDURE — 77412 RADIATION TX DELIVERY LVL 3: CPT | Performed by: INTERNAL MEDICINE

## 2024-10-03 PROCEDURE — 77387 GUIDANCE FOR RADJ TX DLVR: CPT | Performed by: INTERNAL MEDICINE

## 2024-10-03 PROCEDURE — 82024 ASSAY OF ACTH: CPT

## 2024-10-03 PROCEDURE — 84443 ASSAY THYROID STIM HORMONE: CPT

## 2024-10-03 PROCEDURE — 36415 COLL VENOUS BLD VENIPUNCTURE: CPT

## 2024-10-03 PROCEDURE — 83520 IMMUNOASSAY QUANT NOS NONAB: CPT

## 2024-10-03 PROCEDURE — 84481 FREE ASSAY (FT-3): CPT

## 2024-10-03 PROCEDURE — 83615 LACTATE (LD) (LDH) ENZYME: CPT

## 2024-10-03 PROCEDURE — 86140 C-REACTIVE PROTEIN: CPT

## 2024-10-03 PROCEDURE — 85007 BL SMEAR W/DIFF WBC COUNT: CPT

## 2024-10-03 PROCEDURE — 80053 COMPREHEN METABOLIC PANEL: CPT

## 2024-10-03 NOTE — ASSESSMENT & PLAN NOTE
Current regimen:  Celebrex 200mg BID  Tylenol PRN  Lidocaine patch at hs  Heating pad during the day  Failed therapies:  Oxycodone- felt like she had a headache from the medication  Bowel regimen to prevent OIC:  Miralax, colace, metamucil gummies, prune juice  I have reviewed the patient's controlled substance dispensing history in the Prescription Drug Monitoring Program in compliance with the [facility] regulations before prescribing any controlled substance

## 2024-10-03 NOTE — ASSESSMENT & PLAN NOTE
Follows with Dr. Castro [facility] oncology  Scheduled for 15 treatments of radiation therapy- started 10/1  Plan to start immunochemotherapy on 10/6, every 3 weeks for 6 cycles.  Carboplatin  Pemetrexed   Pembrolizumab

## 2024-10-03 NOTE — ASSESSMENT & PLAN NOTE
Patient states she has toruble sleeping  Sleeps a few hours, wakes and will find that she eats to pass the time  On steroid taper    Plan:  Increase Melatonin 6mg QHS

## 2024-10-03 NOTE — ASSESSMENT & PLAN NOTE
Ayla follows with palliative care for psychosocial support and symptom management of lung cancer with brain mets, perianal tumor   Symptoms - pain, sleep trouble   ACP -  on file   RTC - 1 week with RN, 4 weeks with provider    Lives alone.   Has two daughters .   Ex- is supportive and involved in care  Has brother that lives close by that helps as well.     No longer drives.

## 2024-10-04 ENCOUNTER — APPOINTMENT (OUTPATIENT)
Dept: RADIATION ONCOLOGY | Facility: HOSPITAL | Age: 74
End: 2024-10-04
Attending: INTERNAL MEDICINE
Payer: MEDICARE

## 2024-10-04 LAB — ACTH PLAS-MCNC: <1.5 PG/ML (ref 7.2–63.3)

## 2024-10-04 PROCEDURE — 77412 RADIATION TX DELIVERY LVL 3: CPT | Performed by: STUDENT IN AN ORGANIZED HEALTH CARE EDUCATION/TRAINING PROGRAM

## 2024-10-04 PROCEDURE — 77387 GUIDANCE FOR RADJ TX DLVR: CPT | Performed by: STUDENT IN AN ORGANIZED HEALTH CARE EDUCATION/TRAINING PROGRAM

## 2024-10-04 PROCEDURE — 77014 CHG CT GUIDANCE RADIATION THERAPY FLDS PLACEMENT: CPT | Performed by: STUDENT IN AN ORGANIZED HEALTH CARE EDUCATION/TRAINING PROGRAM

## 2024-10-06 NOTE — PROGRESS NOTES
HEMATOLOGY / ONCOLOGY CLINIC FOLLOW UP NOTE    Patient Ayla Nye  MRN: [id number]  : 1950  Date of Encounter 10/9/2024         Reason for Encounter: hospital follow up, pathology follow up for pelvic mass biopsy     C1 2024  C2  2024  C3 2024  C4 2024  C5 2024  C6 2024 at [place] f/b hospitalization for neutropenic fever     First line metastatic treatment     Carboplatin AUC 5 Pemetrexed 500 mg/m2 and Pembrolizumab 200 mg IV every 3 weeks x 4 cycles     C1 held due to hospital admission  C1 held due to radiation and concurrent high-dose steroid use    C1 now 10/7/2024  C2 10/28/2024    Oncology History   Malignant neoplasm of upper lobe, right bronchus or lung (HCC)    Initial Diagnosis    Primary lung adenocarcinoma, right (HCC)     3/26/2024 Biopsy    A-C. Lymph Node, Level 4R :    - Metastatic non-small cell carcinoma, most compatible with a primary lung adenocarcinoma; see note.       D-F. Lymph Node, Level 10R:    - Metastatic non-small cell carcinoma; see note.       G-I. Lymph Node, Level 4L:    - Metastatic non-small cell carcinoma; see note.       4/15/2024 -  Cancer Staged    Staging form: Lung, AJCC 8th Edition  - Clinical stage from 4/15/2024: Stage IIIB (cT2b, cN3, cM0) - Signed by Rogelio Beebe DO on 4/15/2024       2024 - 2024 Chemotherapy    alteplase (CATHFLO), 2 mg, Intracatheter, Every 1 Minute as needed, 6 of 6 cycles  CARBOplatin (PARAPLATIN) IVPB (GOG AUC DOSING), 130.4 mg, Intravenous, Once, 6 of 6 cycles  Administration: 130.4 mg (2024), 144.8 mg (2024), 138.4 mg (2024), 144.8 mg (2024), 132 mg (2024), 143.6 mg (2024)  PACLItaxel (TAXOL) chemo IVPB, 45 mg/m2 = 67.2 mg (90 % of original dose 50 mg/m2), Intravenous, Once, 6 of 6 cycles  Dose modification: 45 mg/m2 (original dose 50 mg/m2, Cycle 1, Reason: Anticipated Tolerance)  Administration: 67.2 mg (2024), 67.2 mg (2024), 67.2 mg  (6/6/2024), 67.2 mg (6/13/2024), 67.2 mg (6/21/2024), 67.2 mg (7/2/2024)     5/23/2024 - 7/5/2024 Radiation    Treatment:  Course: C1    Plan ID Energy Fractions Dose per Fraction (cGy) Dose Correction (cGy) Total Dose Delivered (cGy) Elapsed Days   R Lung_Hilum 6X 30 / 30 200 0 6,000 43      Treatment dates:  C1: 5/23/2024 - 7/5/2024 8/8/2024 - 8/8/2024 Radiation    SRS 5 PTVs   2000 cGy     9/12/2024 Biopsy    Mass, Superficial perianal mass:  - Squamous cell carcinoma.     Note: The patient's prior lung EBUS sampling shows the tumor to stain for TTF1 and Napsin with absent p40 expression.  The current perianal mass sampling shows diffuse p40 expression with absent TTF1 expression.  This may represent a primary anal/perianal squamous cell carcinoma versus a possible metastatic combined lung tumor (adenocarcinoma and previously unsampled squamous component).  Suggest clinical correlation and appropriate follow-up.         9/23/2024 -  Cancer Staged    Staging form: Lung, AJCC 8th Edition  - Clinical: Stage IV (cM1) - Signed by Honey Gamboa MD on 9/23/2024       10/7/2024 -  Chemotherapy    cyanocobalamin, 1,000 mcg, Intramuscular, Once, 1 of 3 cycles  Administration: 1,000 mcg (8/19/2024)  alteplase (CATHFLO), 2 mg, Intracatheter, Every 1 Minute as needed, 0 of 6 cycles  fosaprepitant (EMEND) IVPB, 150 mg, Intravenous, Once, 0 of 6 cycles  CARBOplatin (PARAPLATIN) IVPB (GOG AUC DOSING), 525 mg, Intravenous, Once, 0 of 6 cycles  pemetrexed (ALIMTA) chemo infusion, 500 mg/m2 = 735 mg, Intravenous, Once, 0 of 6 cycles  pembrolizumab (KEYTRUDA) IVPB, 200 mg, Intravenous, Once, 0 of 5 cycles     Metastatic cancer to brain (HCC)   7/29/2024 Initial Diagnosis    Metastatic cancer to brain (HCC)     8/8/2024 - 8/8/2024 Radiation    SRS 5 PTVs   2000 cGy     9/12/2024 Biopsy    Mass, Superficial perianal mass:  - Squamous cell carcinoma.     Note: The patient's prior lung EBUS sampling shows the tumor to stain for  TTF1 and Napsin with absent p40 expression.  The current perianal mass sampling shows diffuse p40 expression with absent TTF1 expression.  This may represent a primary anal/perianal squamous cell carcinoma versus a possible metastatic combined lung tumor (adenocarcinoma and previously unsampled squamous component).  Suggest clinical correlation and appropriate follow-up.              Assessment/Plan:     Ms. 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  She has been tolerating Hydrea 500 mg once daily Monday through Friday and off on Saturday and Sunday. Patient also with 4.1 cm RUL mass 1.5cm spiculated posterior nodule mediastinal adenopathy, no metastatic disease including brain MRI new diagnosis      ET:      Previously treated with hydroxyurea (HYDREA) 500 mg capsule     Plts 498 > 434 > 388 (holding hydroxyurea since 5/23/2024 2/2 drop in platelets from chemotherapy)     Plts 166 (7/4/2024) s/p C6 chemo, continuing to hold hydroxyurea, recheck labs and will plan to restart once labs show thrombocytosis.     Does not need to be restarted on hydrea; labs from 8/1/2024 were 192      Plts 8/24/2024 were 227      NSCLC/adenocarcinoma; mutational analysis pending      Now with RUL 4.2 cm mass with mediastinal adenopathy at least cT2a cN2 cM0 lung cancer     cT2b N3 Stage IIIB lung      Please see above regarding discussion      Carboplatin AUC 2 Taxol 45 mg/m2 weekly with RT     Durvalumab adjuvant post treatment depending on response     Neuropathy has resolved after last cycle of Taxol.     Esophagitis appearing better after completing radiation.     7/1/2024     Was admitted 6/22-6/24/2024 for neutropenic fever     WBC 2.5 ANC 1810 Hgb 9.0 plts 240 (hx ET/off hydrea)     Continue with C6 7/2/2024; last radiation 7/5/2024       7/15/2024     Completed C6 on 7/2/2024 and last radiation 7/5/2024.  Was admitted for neutropenic fever after last  "cycle of chemo.  Discharged on 7/4/2024 and has been asymptomatic since.  Labs at discharge showed WBC 1.5, ANC 1317 and Plt 166.     Repeat CT PET 8/26/2024, radiology read from CT CAP in ED showed \"progression\" of disease but this study was performed during chemoradiation, so it is confounded by the inability to distinguish disease from inflammation.  Will  treatment response on repeat CT PET.     Recheck labs to confirm neutropenia has resolved.     Hold Otezla for psoriasis until neutropenia has resolved, but would like to restart as soon as safe as psoriasis is rebounding.     8/16/2024     Was admitted for new onset brain mets 7/30/2024; on decadron taper down to 1 mg daily next week     S/p SRS 6 fractions completed 8 August 2024 at [place]     Had modest response to treatment per PET in terms of the lung     At issue clearly are the new brain lesions 2.0x.4 left occiput as well as multiple other smaller lesions as below     In addition PET with new SUV 9.9 mass in the right anorectal fat/right ischial tuberosity 2.7x2 which is painful to patient and feels is growing     Will get MRI pelvis; will probably need biopsy.  Less likely abscess and more likely disease but unusual place for a NSCLC to metastasize to     Will start Carboplatin/Pemetrexed/Pembrolizumab and had long discussion regarding this         9/17/2024     Biopsy periranal area as follows:         . Mass, Superficial perianal mass:  - Squamous cell carcinoma.     Note: The patient's prior lung EBUS sampling shows the tumor to stain for TTF1 and Napsin with absent p40 expression.  The current perianal mass sampling shows diffuse p40 expression with absent TTF1 expression.  This may represent a primary anal/perianal squamous cell carcinoma versus a possible metastatic combined lung tumor (adenocarcinoma and previously unsampled squamous component).  Suggest clinical correlation and appropriate follow-up.      Immunohistochemistry was performed " on block A3. The tumor cells are positive for p40, AE1/3, CK7, GATA3; MOC-13 shows nonspecific staining and negative for CDX2, p16, CD45, CD31, synaptophysin, chromogranin, mucicarmine, p53 (wild-type), supporting the above diagnosis.  Napsin A is pending and results will be reported in an addendum.      Will have Rad Onc see patient as pain is unmanageable and will not use narcotics     Is seeing Palliative Care     Will add celebrex 200 mg bid for now as taking extensive Tylenol with no pain relief     Was taking oxycodone, did not help and does not want dilaudid as lives alone      In terms of her metastatic disease, she was being treated for adenocarcinoma and this pathology is SCC; NSCLC is adenocarcinoma and thus this may be new primary lesion     May consider Carboplatin with taxane     Cannot start IO therapy as decadron dose remains above 20 mg prednisone equivalent and attempts at tapering have led to worsening neurological symptoms        9/23/2024     Pain improved with Celebrex/Tylenol      Did see Rad Onc; planning 15 fractions to right gluteal area with path c/w SCC     Markers for lung adenocarcinoma as was Mi[facility]     This occurred also on Carboplatin/Taxol and CRT     Thus, will treat as planned with Carboplatin/Pemetrexed/Pembrolizumab     Decadron 2 mg bid; plan to 2 mg daily next week.     Can remain on that as equivalent to 10 mg prednisone     Plan for chemo at [place] 7th Oct 2024     10/9/2024    Continues with rectal pain; was started on RT for SCC/new primary in right muscle-has had 6/15 fractions    Was on decadron taper to 2 mg daily which allows IO therapy to work as not effective at doses above 10 mg prednisone/2 mg decadron ; Rad Onc increased to 4 mg decadron for pain control but probably compromised efficacy of Pembrolizumab     Seeing Dr Neff tomorrow     Did tolerate C1 of Carboplatin/Pemetrexed/Pembrolizumab with no side effects         Hypothyroid     TSH 16.3 free T4 0.77 prior  to any IO therapy     Will start 50 mcg daily synthroid, adjust in 6 weeks as needed      TSH 9/13/2024 0.403 may need to adjust to 25 mcg daily-will assess repeat labs      10/7/2024 TSH 2.7 Free T3 2.5 so no change     ET    Plts off hydrea 220      Follow up     Late Oct 2024        History of present illness:  Initial Visit 4/10/2024     Ayla Nye is a 73-year-old female with past medical history of hypertension, psoriatic arthritis, CKD stage III, hyperlipidemia, and latent TB.  She is seen in follow-up for essential thrombocytosis.  She has had an elevated platelet count dating back to January 2028 all of her other cell lines were within normal limits.  Her highest platelet count was around 700,000.  Myeloproliferative work-up demonstrated positive JAK2 mutation and she was started on 500 mg of Hydrea daily in July 2020.  Due to leukopenia her dose was reduced to Monday, Wednesday, Friday.  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  Then again in July we had to decrease her dose and she is taking 500 mg once daily Monday through Friday and not taking any on Saturday or Sunday.     She has been tolerating the 500 mg of Hydrea Monday through Friday off Saturday and Sunday well without any side effects.  She was seen by my attending and underwent further workup of her recurrent infections and was found to have a mildly low IgG level.   She has had pneumonia twice once in October and then again in February.  She states that she has had multiple imaging of her chest which does suggest scarring which prompted a CT scan of her chest .  .  She is also had recurrent sinus infections.  She is a former smoker and quit 15 years ago bit was 1[[d x more than 30 years.        She does not have any continued symptoms of pneumonia and  no cough, shortness of breath, or fevers.       She underwent the following testing; EBUS with pulmonary as well as CT chest/PET scan     Lung mass on CT chest 2/28/2024     LUNGS:     -4.1 x 3.4 cm solid mass in the anterior right upper lobe (series 302, image 73).     -Posterior to this mass, there is a 1.5 x 1.5 cm spiculated nodule (series 302, image 78)     -2.5 x 2.1 cm groundglass nodule in the anterior left upper lobe (series 302, image 90)     Mild emphysema.     Right apical pleural-parenchymal scarring.     PLEURA: Small calcified left posterior pleural plaque, which may be from prior asbestos exposure or the sequela of old inflammation.     HEART/GREAT VESSELS: Normal heart size. Mild-moderate coronary artery calcification. Mild to moderate mitral annular calcification. Trace pericardial effusion. No thoracic aortic aneurysm.     MEDIASTINUM AND SUDEEP: 1.6 x 2.0 cm right lower paratracheal/precarinal lymph node. 1.3 x 1.3 cm right lower paratracheal lymph node.        CT PET  3/28/2024     Intensely FDG avid right upper lobe mass, SUV max of 18. This abuts the anterior pleural margin. Central photopenia suggest necrosis. Mass measures on the order of 4.2 cm in size, stable previously 4.1 cm.     Subjacent 1.5 cm spiculated nodule posteriorly demonstrates minimal uptake, SUV max of 1.7.     Minimal FDG uptake in the left upper lobe groundglass nodule, SUV max of 2.2. This measured up to 2.5 cm in size on recent CT, appears grossly stable on low-dose CT.     A few FDG-avid mediastinal lymph nodes. Right anterior paratracheal lymph node demonstrates SUV max of 13. This measures up to 1.9 cm short axis image 85 series 3, may be slightly larger previously 1.6 cm.     A right perihilar lymph node demonstrates SUV max of 12. This measures on the order of 1.2 cm short axis image 89 series 3.  CT images: Scattered emphysematous changes of the lung fields. Mild to moderate coronary artery calcifications. Minimal  left pleural calcification. Small calcified lymph nodes in the left perihilar region        3/26/2024     -C. Lymph Node, Level 4R (ThinPrep, smears and cell block preparations):    - Metastatic non-small cell carcinoma, most compatible with a primary lung adenocarcinoma; see note.    - Satisfactory for evaluation.     D-F. Lymph Node, Level 10R (ThinPrep, smears and cell block preparations):    - Metastatic non-small cell carcinoma; see note.    - Satisfactory for evaluation.     G-I. Lymph Node, Level 4L (ThinPrep, smears and cell block preparations):    - Metastatic non-small cell carcinoma; see note.    - Satisfactory for evaluation.      7/3/2024    CT CHEST, ABDOMEN AND PELVIS WITH IV CONTRAST     INDICATION: hx of lung cancer, fever, cough. Fever (102.9 degrees Fahrenheit), cough, recently had chemotherapy.     COMPARISON: February 28, 2024     TECHNIQUE: CT examination of the chest, abdomen and pelvis was performed. Multiplanar 2D reformatted images were created from the source data.     This examination, like all CT scans performed in the [facility], was performed utilizing techniques to minimize radiation dose exposure, including the use of iterative reconstruction and automated exposure control. Radiation dose length   product (DLP) for this visit: 314 mGy-cm     IV Contrast: 100 mL of iohexol (OMNIPAQUE)  Enteric Contrast: Not administered.     FINDINGS:     CHEST     LUNGS: There is an approximately 4.6 x 4.8 x 3.3 cm mass within the anteromedial aspect of the right upper lobe of the lung. This mass abuts the anterior and anteromedial pleural surface and the right hand aspect of the upper mediastinum. The mass   demonstrates peripheral spiculation and areas of low density centrally, suggesting areas of necrosis. Just posterolateral to this mass there is a spiculated satellite lesion measuring approximately 1.6 cm. There is also a spiculated satellite lesion   anterior and inferior to  the dominant mass, measuring approximately 1.4 cm and abutting the anterior pleural surface (series 302 image 91); this satellite mass appears new compared with February 28, 2024. An ill-defined groundglass opacity in the left   upper lobe of the lung measures approximately 2.5 cm, similar to the prior study.     There is mild to moderate emphysema. There is mild bibasilar scarring versus atelectasis. Right apical scarring unchanged.     PLEURA: Left pleural calcifications unchanged. No pleural effusions. No pneumothorax.     HEART/GREAT VESSELS: Coronary artery disease. There is atherosclerosis of the thoracic aorta. No thoracic aortic aneurysm.     MEDIASTINUM AND SUDEEP: There is a 2.4 x 2.2 cm right paratracheal node demonstrating low density centrally suggesting necrosis; this is larger compared with February 28, 2024. Additionally, there is a 2.8 x 2 cm precarinal node demonstrating low density   centrally suggesting necrosis, also larger compared to the prior study. Necrotic appearing right hilar adenopathy measuring up to 2 cm.     CHEST WALL AND LOWER NECK: Tiny thyroid nodules, measuring 6 mm or less. Incidental discovery of one or more thyroid nodule(s) measuring less than 1.5 cm and without suspicious features is noted in this patient who is above 35 years old; according to   guidelines published in the February 2015 white paper on incidental thyroid nodules in the Journal of the American College of Radiology (JACR), no further evaluation is recommended.     AInterval History:  6/3/2024     Ms Nye is tolerating treatment fairly well but did have a reaction to Taxol at C2.  She has flushing and chest pain; drug was stopped she was given Benadryl/steroids/fluids and started at slower rate.  She has not had issues with N/V but with constipation.  Her joints are more painful.  She is eating but taste is an issue, probably due to Carboplatin.  She denies any GERD but has increased cough which is from CRT.   "She has no SOB.  We discussed Mucinex to help cut the phlegm generated from treatment.  Her weight is 110 pounds which is slightly decreased; her BP is 118/64. She has no other issues          Interval History:  6/17/2024     Doing well; in week 5.  Has esophagitis and carafate slurry  was prescribed; she has not started it.  No N/V, has minimal numbness mostly feet.  Had issue with veins and extravasation fluid left lower arm.  Some reddness, healing, no infection.  Labs ok plts 388 WBC 5.9 Hgb 9.4.  States psoriasis is better.  Her weight is decreased to 104 but she is eating but less.       Interval History:  7/1/2024 6/22/2024 admitted due to neutropenic  fever, rigors.  She also endorsed fatigue which was post chemo.  In the ED, she was found to be fulfilling the SIRS criteria given her low white count, she was empirically started on broad-spectrum antibiotic.  On the following day antibiotics were stopped as she continued to improve.  During her hospitalization, she also developed diarrhea which was to be noninfective in origin.  She also received her scheduled radiation therapy  Discharged 6/24/2024; chemotherapy was held for neutropenia     She is doing better today; very anxious to stop treatment this week.  Feeling improved from admission.      Labs improved as above WBC 2.5 ANC 1810 plts 240 Na 141 k 4/1 Cr 0.85 ALT/AST 8/10      Interval History:  7/15/2024   Completed C6 on 7/2/2024 at [facility] and completed radiation on 7/5/2024.  Was admitted at [facility] for neutropenic fever after last cycle of chemo.  Discharged on 7/4/2024 and has been asymptomatic since.  CT CAP showed radiology read of \"progression\" of disease, but this scan was taken during chemoradiation and cannot distinguish between disease and inflammation.  Labs at discharge showed WBC 1.5, ANC 1317 and Plt 166.  Denied fever/chills, night sweats, adenopathy, or weight loss.  Endorsed worsening of psoriasis.  Neuropathy has " "resolved after last cycle of chemo.  Continues off hydroxyurea 2/2 normal platelet counts and continues off Otezla for psoriasis.            Interval History:  8/16/2024     Patient completed CRT in early July 2024, was admitted for NF and on that scan there appeared to be increase in the RUL mass but again had just finished concurrent treatment with significant inflammation     On 30th July , patient early in the day was well, mowing grass and then noted loss of balance, disorientation, confusion.  She also had vision \"flashes\" in the right field.  She was seen at [place] as below:     Admission 7/30/2024    status post concurrent weekly Taxol/carboplatin with radiation therapy on May 2024 and finished on 7/5/2024, the patient had change in mental status, imbalance, nausea admitted to the hospital on 7/29/2024, MRI of the brain showed multiple supratentorial and infratentorial enhancing lesion associated with vasogenic edema the largest lesion in the left occipital lobe measuring 2 x 1.4 cm with adjacent edema    Treatment options from a radiation therapy standpoint include SRS/SRT or whole brain radiation ± hippocampal avoidance. Given the small size and relatively small number of metastases, SRS would be the preferred treatment approach. I discussed with Dr. Norwood with consensus for SRS.     Possible short- and long-term side effects inclide but are not limited to, fatigue, headache, nausea, alopecia, vomiting, injury to the cranial nerves and/or visual structures including the optic nerve and chiasm, a low risk of neurocognitive effects, neurologic deficits, radionecrosis which may require treatment with steroids or surgical resection, hearing loss, stroke-like symptoms, and a low risk of radiation-induced secondary malignancy. Even with appropriate treatment, there is a risk of progression.  One some occasions, additional/other tumors may develop requiring re-evaluation.        MRI Brain 7/30/2024     1. " Multiple supratentorial and infratentorial enhancing lesions with associated vasogenic edema, the largest of which is in the left occipital lobe. Findings are most consistent with metastases.     2. No acute infarct or midline shift.     She had the PET scan done after discharge as below:        PET scan 8/5/2024     Persistent hypermetabolic medial right upper lobe lung mass measuring 3.3 x 2.7 cm on image 132 of series 3 with max SUV of 10.0, previously measuring 3.7 x 2.9 cm with max SUV of 18.0 when utilizing similar measurement technique.     On image 132 of series 3 there is a stable adjacent 7 mm right upper lobe lung nodule with max SUV of 2.1, previously similar in size with max SUV of 1.7     Improving hypermetabolic right paratracheal mediastinal and right hilar yaw metastatic disease. For example, on image 137, hypermetabolic right paratracheal lymph node measures 7 mm in short axis with max SUV of 3.7, previously approximately 1.3 cm   with max SUV of 13.0.     Stable mildly FDG avid semisolid groundglass nodular opacity involving the left upper lobe measuring 2.6 x 1.1 cm on image 142 of series 3 with max SUV of 1.9, previously similar in size with max SUV of 2.2.  CT images: Atherosclerotic vascular calcifications including those of the coronary arteries are noted. Emphysematous changes.     1. New hypermetabolic soft tissue mass involving the right anorectal fat medial to the right ischial tuberosity. While this finding could reflect hypermetabolic metastatic disease related to patient's primary bronchogenic carcinoma the distribution is   atypical for metastatic disease from bronchogenic carcinoma. Therefore, consider further evaluation with tissue sampling to exclude secondary hypermetabolic malignancy.     2. Persistent hypermetabolic right upper lobe bronchogenic carcinoma with hypermetabolic mediastinal and right hilar yaw metastatic disease which overall is improved since prior PET/CT.      3. Hypermetabolic left occipital lobe metastatic disease. Patient's known intracranial metastatic disease was better characterized on prior MRI of the brain.     Today, she completed SRS at [place] and is on a steroid taper; 2 mg daily decadron and will go to 1 mg daily decadron next week.  Completed RT 8 August 2024.  Doing well, no confusion, ambulating without issue.  Is on Keppra which she will remain on; 500 mg bid given.  Decadron will continue for now and probably for another additional week due to potential recall inflammation with Pembrolizumab.  She will get first line treatment in the metastatic setting with Carboplatin/Pemetrexed/Pembrolizumab.  Would like to start in one week.  Consent obtained.  Concern with IO causing more inflammation in the brain  as crosses BBB and thus may continue on 1 mg decadron which is equivalent to 5 mg prednisone and thus will not impede efficacy of IO therapy     Does have new right ischial tuberosity lesion on PET which she is having pain.  Will get MRI and may need RT for pain palliation but unclear if this is metastatic disease vs new primary as unusual for lung to spread in this fashion.     She has no fevers, bowel issues/changes to suggest abscess.             Interval History:  9/3/2024        Admitted 8//21-25 for increased vasogenic edema from too quick steroid taper.  Will have to do slower taper; hold IO until at 2 mg decadron      Current on 1 mg 3x this week and then 1 mg for one day next week then off.  Did have hairline fracture elbow due to fall after discharge.      Had an MRI right pelvis 8/30/2024 due to increasing pain and lesion noted on PET which is larger and has more pain     Soft tissue mass in the right ischioanal fat measuring 3.8 x 2.6 x 3.4 cm (5/33, 2/11), corresponding to the FDG avid lesion seen on prior PET/CT. It has hyperintense T2 weighted and hypointense T1-weighted signal with thick nodular   and irregular enhancement predominantly  along its periphery with hypoenhancement at the center. Lesion size and has increased in size from prior PET CT dated 8/5/2024 at which time it measured 2.7 x 2.0 cm and CT study dated 7/3/2024 at which time it   measured 0.9 x 0.7 cm. Overall findings are suspicious for a malignant lesion, likely metastasis from patient's cancer.     It is very unlikely resectable, most likely due to her NSCLC but will attempt biopsy.     Will speak with Dr Gamboa and see if can get palliative radiation to control pain.  Is 3.8x2.6x3.4 cm noted on PET as well.  Was there in June and was 0.9x0.7 cm and 8/5/2024 was 2.7x2.0     She has not felt confused back on decadron and am concerned with stopping decadron     She will now be treated in the C2 slot on 16th Sept 2024 with her first cycle of Carboplatin/Alimta/Pembrolizumab     Her TSH baseline was elevated at 16.4 with free T4 0.77 and thus will start synthroid 50 mcg daily repeat TFTs in 6 weeks      Interval History: 9/17/2024     Patient is here for treatment and hospital follow up.     Admitted 9/9/24 to 9/12/24 for AMS found to have MRI Brain showing increased cerebral vasogenic edema after recently steroid taper (not stopped), c/f leptomeningeal mets, evidence of new tiny temporal lobe met, evidence of unchanged parietal and occipital mets, and evidence of decreased frontal lobe and cerebellar mets.  She was also noted to have a new mass of her deep R gluteal fossa.  Her mental status rapidly improved with steroids.  Patient underwent LP and biopsy of her R gluteal mass.  Initial LP studies showed 0 WBCs, 0 RBCs, normal glucose, slightly elevated protein at 73, and Gram stain and cultures were negative ruling out meningitis and ICH.  No evidence thus far of leptomeningeal metastasis on LP.  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     She was placed on a steroid taper at discharge on 9/12 as follows:       -dexamethasone 4 BID x4  days f/b       -dexamethasone 4 AM and 2 PM x7 days f/b       -dexamethasone 2 BID x7 days f/b       -dexamethasone 2 daily with further taper per heme/onc     Her levothyroxine was discontinued and will be followed with serial blood draws.     Labs from the day of her discharge (9/13/24) showed folate >22, B12 1268, , WBC 8.5, Hb 10.5, Plt 264, TSH 0.403, FT40.92, and FT3 2.33.     Patient endorsed acute on chronic anal pain that has worsened since her anal mass biopsy.  The pain is sharp and severe and occasionally radiates down her leg.  It is best in the morning after taking her steroids.  She was prescribed oxycodone 5 mg Q4H (36 MME) at hospital discharge which has not helped her pain.  She spoke with palliative over the phone yesterday and has an appointment with them tomorrow.  They recommended changing from oxycodeone to Dilaudid 2 mg PO Q4H (60 MME) and they gave her Narcan.  She is very concerned about taking opioids as she lives alone and would not have anyone to give her Narcan.     She is amanable to radiation therapy and would like to start as soon as possible.           Interval History: 9/23/2024     Biopsy right subcutaneous pelvic mass is consistent with new primary SCC     This addendum is issued to add immunohistochemistry results. The final diagnosis stays the same.  Tumor cells are negative for napsin A.   Addendum electronically signed by Lulu Bailey MD on 9/17/2024 at  8:18 AM   Final Diagnosis   A. Mass, Superficial perianal mass:  - Squamous cell carcinoma.     Note: The patient's prior lung EBUS sampling shows the tumor to stain for TTF1 and Napsin with absent p40 expression.  The current perianal mass sampling shows diffuse p40 expression with absent TTF1 expression.  This may represent a primary anal/perianal squamous cell carcinoma versus a possible metastatic combined lung tumor (adenocarcinoma and previously unsampled squamous component).  Suggest clinical correlation and  appropriate follow-up.      Immunohistochemistry was performed on block A3. The tumor cells are positive for p40, AE1/3, CK7, GATA3; MOC-13 shows nonspecific staining and negative for CDX2, p16, CD45, CD31, synaptophysin, chromogranin, mucicarmine, p53 (wild-type), supporting the above diagnosis.  Napsin A is pending and results will be reported in an addendum.         At this point, Rad Onc needs to radiate for palliation as with significant pain.  Started Celebrex last week as oxycodone was not helping, will not take dilaudid as prescribed by Palliative     At issue is two primary cancers, her lung metastatic, she is not going to tolerate treatment for anal cancer nor a workup and can consider chemo with a taxane to cover SCC  However the immediate issue is the significant pain she has and RT is more appropriate         Plan is to start RT with 15 fractions within the week     Pain better with Celebrex 200 mg bid and Tylenol.  States holds her pain and again wants no narcotics.     She does have moon face from decadron but feels well and with the taper at 2 mg bid at present is not losing balance, having CNS issues     Plan is 2 mg decadron next week     For chemo 7th Oct although concern with IO issues with RT as well as chemo sensitization but do not want to delay chemotherapy at this point                 Interval History: 10/9/2024    Started C1 of Carbooplatin/Pemetrexed/Pembrolizumab which tolerated without issues.  Pain is issue; started RT 6/15 fractions.  For pain control her decadron was increased to 4 mg which inactivated the effectiveness of IO as cannot get above 10 mg prednisone/2 mg decadron.  Pain no better.  Should be seen by Palliative care for pain management    Otherwise, did well with the chemotherapy    Labs 10/3/2024 include Na 139 K 4.5 Cr 0.87 Ca 8.8 ALT/Ast 12/12/ WBC 8.1 Hgb 10.2  plts 220 ANC 7170 lipase 39          REVIEW OF SYSTEMS: as above   Please note that a 14-point  review of systems was performed to include Constitutional, HEENT, Respiratory, CVS, GI, , Musculoskeletal, Integumentary, Neurologic, Rheumatologic, Endocrinologic, Psychiatric, Lymphatic, and Hematologic/Oncologic systems were reviewed and are negative unless otherwise stated in HPI. Positive and negative findings pertinent to this evaluation are incorporated into the history of present illness.      ECOG PS: 1    PROBLEM LIST:  Patient Active Problem List   Diagnosis    TB lung, latent    Psoriatic arthritis (HCC)    Essential thrombocytosis (HCC)    Hypertension    Mixed hyperlipidemia    Encounter for monitoring of hydroxyurea therapy    JAK2 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    Age-related osteoporosis without current pathological fracture    Malignant neoplasm of upper lobe, right bronchus or lung (HCC)    Bilateral leg pain    Insomnia    Moderate protein-calorie malnutrition (HCC)    Dehydration    Visual disturbance    Metastatic cancer to brain (HCC)    Brain mass    Malignant neoplasm metastatic to brain (HCC)    Acute metabolic encephalopathy    Altered mental status    Hypothyroidism    Abnormal imaging of central nervous system    Right hip pain    Goals of care, counseling/discussion    Cancer related pain    Palliative care patient    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 2023), Essential thrombocytosis (HCC), Hyperlipidemia, Hypertension, Mass in chest, Nodular goiter, Osteoporosis, Pneumonia (Oct 16, 2023), Psoriasis, and SIRS (systemic inflammatory response syndrome) (HCC) (06/22/2024).    PAST SURGICAL HISTORY:   has a past surgical history that includes Appendectomy; Mammo needle localization right (all inc) (Right, 07/13/2009); Skin lesion excision; Colonoscopy (10/31/2018); Mammo (historical); DXA procedure(historical) (04/21/2017); Breast cyst excision (Right,  2009); IR lumbar puncture (9/12/2024); and IR biopsy other (9/12/2024).    CURRENT MEDICATIONS  Current Outpatient Medications   Medication Sig Dispense Refill    acetaminophen (TYLENOL) 325 mg tablet Take 325 mg by mouth every 6 (six) hours as needed for mild pain Taking 3 tabs every 6 hours      aspirin 81 mg chewable tablet Chew 81 mg daily.      celecoxib (CeleBREX) 100 mg capsule Take 2 capsules (200 mg total) by mouth 2 (two) times a day 30 capsule 1    Cholecalciferol (VITAMIN D3) 5000 units TABS Take 2,000 Units by mouth Daily      dexamethasone (DECADRON) 2 mg tablet Take 1 tablet (2 mg total) by mouth 2 (two) times a day with meals for 63 doses PLEASE taper instructions in AVS.Take 4 mg (2 tab) twice daily starting this evening 9/12/24 and continue till the end of this week including [place] 9/15. Start taking 4 mg (2 tabs) in the morning and 2 mg (1 tab) in the evening starting 9/16 through 9/22.Start taking 2 mg (1 tab ) twice daily starting 9/23 through 9/29.Start taking 2 mg (1 tab) once daily until advised otherwise by your PCP or oncologist 63 tablet 0    diphenhydrAMINE (BENADRYL) 25 mg capsule Take 25 mg by mouth every 12 (twelve) hours as needed for itching      folic acid (FOLVITE) 1 mg tablet Take 1 tablet (1 mg total) by mouth daily 30 tablet 6    levETIRAcetam (Keppra) 750 mg tablet Take 1 tablet (750 mg total) by mouth 2 (two) times a day 60 tablet 0    Lidocaine 4 % PTCH Apply topically 4% applies to buttocks as needed every 8 to 12 hours      MELATONIN PO Take 6 mg by mouth daily at bedtime      pantoprazole (PROTONIX) 40 mg tablet TAKE 1 TABLET(40 MG) BY MOUTH DAILY EARLY MORNING 30 tablet 5    Probiotic Product (PROBIOTIC DAILY PO) Take 1 capsule by mouth daily      Psyllium (METAMUCIL PO) Take by mouth Gummies for constipation      vitamin B-12 (VITAMIN B-12) 1,000 mcg tablet Take 1 tablet (1,000 mcg total) by mouth daily 90 tablet 1     No current facility-administered medications for  this visit.     Facility-Administered Medications Ordered in Other Visits   Medication Dose Route Frequency Provider Last Rate Last Admin    fentaNYL injection   Intravenous PRN Greg Gamble CRNA        midazolam (VERSED) injection   Intravenous PRN Greg Tin Gamble CRNA         [unfilled]    SOCIAL HISTORY:   reports that she has quit smoking. Her smoking use included cigarettes. She started smoking about 58 years ago. She has a 58.8 pack-year smoking history. She has been exposed to tobacco smoke. She has never used smokeless tobacco. She reports that she does not currently use alcohol. She reports that she does not use drugs.     FAMILY HISTORY:  family history includes Cancer in her brother and brother; Coronary artery disease in her brother; Depression in her mother; Hearing loss in her mother; Hypertension in her brother and mother; No Known Problems in her daughter, daughter, maternal aunt, maternal aunt, maternal aunt, maternal aunt, maternal grandfather, maternal grandmother, paternal aunt, paternal grandfather, and paternal grandmother; Stroke in her mother; Tuberculosis in her brother and father.     ALLERGIES:  is allergic to doxycycline, keflex [cephalexin], and neomycin-polymyxin-dexameth.      Physical Exam:  Vital Signs:   Visit Vitals  OB Status Postmenopausal   Smoking Status Former     There is no height or weight on file to calculate BMI.  There is no height or weight on file to calculate BSA.    GEN: Alert, awake oriented x3, in no acute distress  HEENT- No pallor, icterus, cyanosis, no oral mucosal lesions,   LAD - no palpable cervical, clavicle, axillary, inguinal LAD  Heart- normal S1 S2, regular rate and rhythm, No murmur, rubs.   Lungs- clear breathing sound bilateral.   Abdomen- soft, Non tender, bowel sounds present  Extremities- No cyanosis, clubbing, edema  Neuro- No focal neurological deficit    Labs:  Lab Results   Component Value Date    WBC 8.06 10/03/2024    HGB 10.2 (L)  10/03/2024    HCT 32.6 (L) 10/03/2024     (H) 10/03/2024     10/03/2024     Lab Results   Component Value Date    SODIUM 139 10/03/2024    K 4.5 10/03/2024     10/03/2024    CO2 28 10/03/2024    AGAP 5 10/03/2024    BUN 25 10/03/2024    CREATININE 0.87 10/03/2024    GLUC 130 09/13/2024    GLUF 100 (H) 10/03/2024    CALCIUM 8.8 10/03/2024    AST 12 (L) 10/03/2024    ALT 12 10/03/2024    ALKPHOS 34 10/03/2024    TP 6.4 10/03/2024    TBILI 0.30 10/03/2024    EGFR 66 10/03/2024           I spent 40 minutes on chart review, face to face counseling time, coordination of care and documentation.    Rosalinda Castro MD PhD

## 2024-10-07 ENCOUNTER — APPOINTMENT (OUTPATIENT)
Dept: RADIATION ONCOLOGY | Facility: HOSPITAL | Age: 74
End: 2024-10-07
Attending: INTERNAL MEDICINE
Payer: MEDICARE

## 2024-10-07 ENCOUNTER — TELEPHONE (OUTPATIENT)
Age: 74
End: 2024-10-07

## 2024-10-07 ENCOUNTER — HOSPITAL ENCOUNTER (OUTPATIENT)
Dept: INFUSION CENTER | Facility: HOSPITAL | Age: 74
Discharge: HOME | End: 2024-10-07
Attending: INTERNAL MEDICINE
Payer: MEDICARE

## 2024-10-07 VITALS
OXYGEN SATURATION: 98 % | DIASTOLIC BLOOD PRESSURE: 80 MMHG | HEART RATE: 82 BPM | RESPIRATION RATE: 18 BRPM | BODY MASS INDEX: 21.87 KG/M2 | WEIGHT: 118.83 LBS | TEMPERATURE: 97.8 F | HEIGHT: 62 IN | SYSTOLIC BLOOD PRESSURE: 154 MMHG

## 2024-10-07 DIAGNOSIS — C34.11 MALIGNANT NEOPLASM OF UPPER LOBE, RIGHT BRONCHUS OR LUNG (HCC): Primary | ICD-10-CM

## 2024-10-07 PROCEDURE — 77336 RADIATION PHYSICS CONSULT: CPT | Performed by: INTERNAL MEDICINE

## 2024-10-07 PROCEDURE — 77412 RADIATION TX DELIVERY LVL 3: CPT | Performed by: RADIOLOGY

## 2024-10-07 PROCEDURE — 96411 CHEMO IV PUSH ADDL DRUG: CPT

## 2024-10-07 PROCEDURE — 96367 TX/PROPH/DG ADDL SEQ IV INF: CPT

## 2024-10-07 PROCEDURE — 96413 CHEMO IV INFUSION 1 HR: CPT

## 2024-10-07 PROCEDURE — 77014 CHG CT GUIDANCE RADIATION THERAPY FLDS PLACEMENT: CPT | Performed by: RADIOLOGY

## 2024-10-07 PROCEDURE — 96417 CHEMO IV INFUS EACH ADDL SEQ: CPT

## 2024-10-07 PROCEDURE — 77387 GUIDANCE FOR RADJ TX DLVR: CPT | Performed by: RADIOLOGY

## 2024-10-07 RX ORDER — SODIUM CHLORIDE 9 MG/ML
20 INJECTION, SOLUTION INTRAVENOUS ONCE
Status: COMPLETED | OUTPATIENT
Start: 2024-10-07 | End: 2024-10-07

## 2024-10-07 RX ADMIN — FOSAPREPITANT 150 MG: 150 INJECTION, POWDER, LYOPHILIZED, FOR SOLUTION INTRAVENOUS at 10:23

## 2024-10-07 RX ADMIN — SODIUM CHLORIDE 20 ML/HR: 9 INJECTION, SOLUTION INTRAVENOUS at 09:58

## 2024-10-07 RX ADMIN — DEXAMETHASONE SODIUM PHOSPHATE: 10 INJECTION, SOLUTION INTRAMUSCULAR; INTRAVENOUS at 09:58

## 2024-10-07 RX ADMIN — CARBOPLATIN 347 MG: 10 INJECTION, SOLUTION INTRAVENOUS at 12:39

## 2024-10-07 RX ADMIN — PEMETREXED DISODIUM 700 MG: 500 INJECTION, POWDER, LYOPHILIZED, FOR SOLUTION INTRAVENOUS at 12:25

## 2024-10-07 RX ADMIN — SODIUM CHLORIDE 200 MG: 9 INJECTION, SOLUTION INTRAVENOUS at 11:13

## 2024-10-07 NOTE — TELEPHONE ENCOUNTER
Patient called from infusion center as directed by staff because her blood pressure was 179/79. Patients blood pressure medication was stopped during chemo due to it being to low.  What would you advise?    Also patient requesting Levetiracetam (keppra) 750 mg take 1 BID #60. Please advise if patient is to continue

## 2024-10-07 NOTE — TELEPHONE ENCOUNTER
Patient should check blood pressure at home every day and if it is consistently remaining elevated please call back.  And yes she has to continue Keppra twice a day

## 2024-10-07 NOTE — PROGRESS NOTES
Ayla yNe  tolerated treatment well with no complications.      Ayla Nye is aware of future appt on 10/28 at 1030.     AVS printed and given to Ayla Nye:    No (Declined by Ayla Nye)

## 2024-10-07 NOTE — TELEPHONE ENCOUNTER
Called and spoke to patient advised to monitor BP, she need Keppra 750 mg to be sent to her pharmacy one po BID

## 2024-10-08 ENCOUNTER — HOSPITAL ENCOUNTER (OUTPATIENT)
Dept: MRI IMAGING | Facility: HOSPITAL | Age: 74
Discharge: HOME | End: 2024-10-08
Attending: INTERNAL MEDICINE
Payer: MEDICARE

## 2024-10-08 ENCOUNTER — APPOINTMENT (OUTPATIENT)
Dept: RADIATION ONCOLOGY | Facility: HOSPITAL | Age: 74
End: 2024-10-08
Attending: INTERNAL MEDICINE
Payer: MEDICARE

## 2024-10-08 DIAGNOSIS — C79.31 METASTASIS TO BRAIN (HCC): ICD-10-CM

## 2024-10-08 DIAGNOSIS — C79.31 METASTATIC CANCER TO BRAIN (HCC): ICD-10-CM

## 2024-10-08 DIAGNOSIS — C34.91 NSCLC OF RIGHT LUNG (HCC): ICD-10-CM

## 2024-10-08 DIAGNOSIS — C79.31 METASTASIS TO BRAIN (HCC): Primary | ICD-10-CM

## 2024-10-08 PROCEDURE — A9585 GADOBUTROL INJECTION: HCPCS

## 2024-10-08 PROCEDURE — 77427 RADIATION TX MANAGEMENT X5: CPT | Performed by: STUDENT IN AN ORGANIZED HEALTH CARE EDUCATION/TRAINING PROGRAM

## 2024-10-08 PROCEDURE — 77014 CHG CT GUIDANCE RADIATION THERAPY FLDS PLACEMENT: CPT | Performed by: RADIOLOGY

## 2024-10-08 PROCEDURE — 77387 GUIDANCE FOR RADJ TX DLVR: CPT | Performed by: RADIOLOGY

## 2024-10-08 PROCEDURE — 70553 MRI BRAIN STEM W/O & W/DYE: CPT

## 2024-10-08 PROCEDURE — 77412 RADIATION TX DELIVERY LVL 3: CPT | Performed by: RADIOLOGY

## 2024-10-08 RX ORDER — LEVETIRACETAM 750 MG/1
750 TABLET ORAL 2 TIMES DAILY
Qty: 60 TABLET | Refills: 0 | Status: SHIPPED | OUTPATIENT
Start: 2024-10-08 | End: 2024-11-07

## 2024-10-08 RX ORDER — DEXAMETHASONE 2 MG/1
2 TABLET ORAL 2 TIMES DAILY WITH MEALS
Qty: 14 TABLET | Refills: 0 | Status: SHIPPED | OUTPATIENT
Start: 2024-10-08

## 2024-10-08 RX ORDER — GADOBUTROL 604.72 MG/ML
10 INJECTION INTRAVENOUS
Status: COMPLETED | OUTPATIENT
Start: 2024-10-08 | End: 2024-10-08

## 2024-10-08 RX ADMIN — GADOBUTROL 10 ML: 604.72 INJECTION INTRAVENOUS at 18:51

## 2024-10-08 NOTE — TELEPHONE ENCOUNTER
Called patient to let her know Keppra was sent to the pharmacy this morning and she said she was already notified.  States her blood pressure was high again this morning 179/79,will monitor throughout the day. Wondered if the high blood pressure has anything to do with the streroids she is taking.   Will call office if blood pressure continues to run high.

## 2024-10-09 ENCOUNTER — OFFICE VISIT (OUTPATIENT)
Age: 74
End: 2024-10-09
Payer: MEDICARE

## 2024-10-09 ENCOUNTER — APPOINTMENT (OUTPATIENT)
Dept: RADIATION ONCOLOGY | Facility: HOSPITAL | Age: 74
End: 2024-10-09
Attending: INTERNAL MEDICINE
Payer: MEDICARE

## 2024-10-09 ENCOUNTER — TELEPHONE (OUTPATIENT)
Age: 74
End: 2024-10-09

## 2024-10-09 VITALS
TEMPERATURE: 97.4 F | HEART RATE: 65 BPM | HEIGHT: 62 IN | DIASTOLIC BLOOD PRESSURE: 90 MMHG | RESPIRATION RATE: 18 BRPM | OXYGEN SATURATION: 100 % | SYSTOLIC BLOOD PRESSURE: 160 MMHG | BODY MASS INDEX: 22.26 KG/M2 | WEIGHT: 121 LBS

## 2024-10-09 DIAGNOSIS — C34.11 MALIGNANT NEOPLASM OF UPPER LOBE, RIGHT BRONCHUS OR LUNG (HCC): ICD-10-CM

## 2024-10-09 DIAGNOSIS — D47.3 ESSENTIAL THROMBOCYTOSIS (HCC): Primary | ICD-10-CM

## 2024-10-09 DIAGNOSIS — C79.31 MALIGNANT NEOPLASM METASTATIC TO BRAIN (HCC): ICD-10-CM

## 2024-10-09 PROCEDURE — 99215 OFFICE O/P EST HI 40 MIN: CPT | Performed by: INTERNAL MEDICINE

## 2024-10-09 PROCEDURE — 77014 CHG CT GUIDANCE RADIATION THERAPY FLDS PLACEMENT: CPT | Performed by: STUDENT IN AN ORGANIZED HEALTH CARE EDUCATION/TRAINING PROGRAM

## 2024-10-09 PROCEDURE — 77412 RADIATION TX DELIVERY LVL 3: CPT | Performed by: STUDENT IN AN ORGANIZED HEALTH CARE EDUCATION/TRAINING PROGRAM

## 2024-10-09 PROCEDURE — 77387 GUIDANCE FOR RADJ TX DLVR: CPT | Performed by: STUDENT IN AN ORGANIZED HEALTH CARE EDUCATION/TRAINING PROGRAM

## 2024-10-09 NOTE — PATIENT INSTRUCTIONS
Next chemo 28 Oct    Continue with Radiation    Taper steroids to 2 mg decadron daily due to Keytruda effect

## 2024-10-09 NOTE — TELEPHONE ENCOUNTER
Called patient just to follow up that her 11/5 appt has been scheduled at [facility] for 140 pm instead of 2 pm as discussed at check out.  Change in time is fine with patient.

## 2024-10-10 ENCOUNTER — TELEPHONE (OUTPATIENT)
Age: 74
End: 2024-10-10

## 2024-10-10 ENCOUNTER — APPOINTMENT (OUTPATIENT)
Dept: RADIATION ONCOLOGY | Facility: HOSPITAL | Age: 74
End: 2024-10-10
Attending: INTERNAL MEDICINE
Payer: MEDICARE

## 2024-10-10 ENCOUNTER — TELEPHONE (OUTPATIENT)
Dept: PALLIATIVE CARE | Facility: HOSPITAL | Age: 74
End: 2024-10-10

## 2024-10-10 ENCOUNTER — OFFICE VISIT (OUTPATIENT)
Age: 74
End: 2024-10-10

## 2024-10-10 VITALS
RESPIRATION RATE: 16 BRPM | SYSTOLIC BLOOD PRESSURE: 138 MMHG | HEIGHT: 62 IN | WEIGHT: 122.4 LBS | DIASTOLIC BLOOD PRESSURE: 88 MMHG | BODY MASS INDEX: 22.52 KG/M2 | HEART RATE: 71 BPM | OXYGEN SATURATION: 98 % | TEMPERATURE: 98.8 F

## 2024-10-10 DIAGNOSIS — G47.00 INSOMNIA, UNSPECIFIED TYPE: Primary | ICD-10-CM

## 2024-10-10 DIAGNOSIS — C79.31 METASTASIS TO BRAIN (HCC): Primary | ICD-10-CM

## 2024-10-10 DIAGNOSIS — G89.3 CANCER RELATED PAIN: ICD-10-CM

## 2024-10-10 DIAGNOSIS — Z51.5 PALLIATIVE CARE PATIENT: Primary | ICD-10-CM

## 2024-10-10 DIAGNOSIS — Z51.5 PALLIATIVE CARE PATIENT: ICD-10-CM

## 2024-10-10 DIAGNOSIS — C49.5: ICD-10-CM

## 2024-10-10 DIAGNOSIS — C79.31 MALIGNANT NEOPLASM METASTATIC TO BRAIN (HCC): ICD-10-CM

## 2024-10-10 PROCEDURE — NC001 PR NO CHARGE

## 2024-10-10 PROCEDURE — 77014 CHG CT GUIDANCE RADIATION THERAPY FLDS PLACEMENT: CPT | Performed by: RADIOLOGY

## 2024-10-10 PROCEDURE — 77387 GUIDANCE FOR RADJ TX DLVR: CPT | Performed by: RADIOLOGY

## 2024-10-10 PROCEDURE — 77412 RADIATION TX DELIVERY LVL 3: CPT | Performed by: RADIOLOGY

## 2024-10-10 RX ORDER — GABAPENTIN 300 MG/1
300 CAPSULE ORAL
Qty: 30 CAPSULE | Refills: 0 | Status: SHIPPED | OUTPATIENT
Start: 2024-10-10

## 2024-10-10 RX ORDER — HYDROMORPHONE HYDROCHLORIDE 2 MG/1
2 TABLET ORAL EVERY 4 HOURS PRN
Qty: 90 TABLET | Refills: 0 | Status: SHIPPED | OUTPATIENT
Start: 2024-10-10

## 2024-10-10 RX ORDER — DEXAMETHASONE 1 MG
TABLET ORAL
Qty: 11 TABLET | Refills: 0 | Status: SHIPPED | OUTPATIENT
Start: 2024-10-17 | End: 2024-11-01

## 2024-10-10 NOTE — TELEPHONE ENCOUNTER
Received message from Dr. Neff from Rad/Onc- patient complaining of pain. She was previously prescribed dilaudid but did not pickup the script as she had some concerns about opioids medications.      Discussed the use of gabapentin with patient. She is agreeable to try. Aware she can continue to use Tylenol PRN, not finding relief with Celebrex will stop.   Will also send new script for dilaudid as patient will try for severe pain.     RX's sent to [facility] pharmacy.

## 2024-10-10 NOTE — TELEPHONE ENCOUNTER
Patient wanted to speak with Cyndi Drew-Geovanni about medication that Dr Gregory Neff is thinking about putting her on.  Please call to discuss

## 2024-10-10 NOTE — TELEPHONE ENCOUNTER
Spoke with patient.   Patient starting Gabapentin 300mg at bedtime.   Dilaudid sent in for severe pain.   Please call patient tomorrow to check on pain.

## 2024-10-10 NOTE — PROGRESS NOTES
Ayla Nye was seen in the home today. Patient benefits from being seen in the home for follow up for symptom management secondary to palliative diagnosis of lung cancer with mets to brain, perianal tumor, trouble sleeping, BP check, and emotional support. Patient seen unaccompanied. Patient  provided all pertinent information today.    Assessment    Symptom Management    ESAS Scale - Please rate from 0-10 the degree to which you experience the following symptoms (0 being none to 10 being the worst):  1.Pain - pressure and burning in buttocks that radiates into right thigh - 3/10 during the day, at 6:00 pm 5/10, and at midnight 10/10 - radiation oncology is managing patient's cancer related pain, pt has an appointment with Dr. Neff this afternoon and will discuss pain management.  Pt states increase in Dexamethasone to 2 mg twice a day has not helped with decreasing pain at night, also states tylenol and lidocaine patch do not help with pain at night, uses heating pad and ice alternating at night without any relief.  2.Tired - 7  3.Drowsiness - 7  4.Nausea - 0  5.Appetite - 0  6.Shortness of Breath - 0  7.Depression - 1  8.Anxiety - 1  9.Wellbeing - 7- due to buttocks pain             10.Constipation - 2 - continues to take colace, metamucil, and prune juice - moves bowels once a day   11. Sleeping 6 - improved - sleeps 4 hours since increasing melatonin to 6 mg at night, pain wakes pt up at midnight after 4 hours of sleep     Physical Assessment    Neurological - alert & oriented x4    Peripheral vascular - no edema in bilateral LE    Respiratory - clear breath sounds, no effort    Abdominal - positive bowel sounds, abdomen soft and nondistended    Bowel/bladder - no bowel or bladder incontinence, moves bowels once a day - takes colace, metamucil gummies, and prune juice    Musculoskeletal - minimal fall risk - can occasionally lose balance - has cane if needed - pt does not use at this time    Skin -  normal color    Psych - presents in a good mood, voices concern over increase in pain this week, states it is the worst it has been - supportive listening and emotional support provided, pt will express concerns to Dr. Neff this afternoon    Nutrition - good appetite, no trouble swallowing    Sleep - see above note in ESAS Scale    Environmental Assessment     Safety - no any safety concerns     Living Arrangements - lives alone     Transportation - pt no longer drives, ex- and friends transport pt to appointments, pt made aware of [facility] - pt not interested at this time     ADLs - pt completes all levels of care by herself (cooking, cleaning, taking meds, laundry, shopping, personal finances, communication)     In home care services/ caregiver - No       All medications were verified today. Medication list was reviewed with patient. All medications are up to date. Patient has no refills sent to the provider via [email or web address].    All medical, surgical, family and social history were reviewed with patient. There are no updates to patient's history..    Allergies verified. No new allergies.    All DME pertaining to patients needs were reviewed. Pt has no DME needs at this time.    Advanced Care Planning: POA - daughters, Juliana Quispe and Sue Antony are health care agents; friend, Lacey Harvey, is successor agent.    Answered all questions and concerns at this time.  Next appointment: October 31, 2024 with provider and RN.

## 2024-10-11 ENCOUNTER — APPOINTMENT (OUTPATIENT)
Dept: RADIATION ONCOLOGY | Facility: HOSPITAL | Age: 74
End: 2024-10-11
Attending: INTERNAL MEDICINE
Payer: MEDICARE

## 2024-10-11 PROCEDURE — 77412 RADIATION TX DELIVERY LVL 3: CPT | Performed by: STUDENT IN AN ORGANIZED HEALTH CARE EDUCATION/TRAINING PROGRAM

## 2024-10-11 PROCEDURE — 77014 CHG CT GUIDANCE RADIATION THERAPY FLDS PLACEMENT: CPT | Performed by: STUDENT IN AN ORGANIZED HEALTH CARE EDUCATION/TRAINING PROGRAM

## 2024-10-11 PROCEDURE — 77387 GUIDANCE FOR RADJ TX DLVR: CPT | Performed by: STUDENT IN AN ORGANIZED HEALTH CARE EDUCATION/TRAINING PROGRAM

## 2024-10-14 ENCOUNTER — PATIENT OUTREACH (OUTPATIENT)
Dept: HEMATOLOGY ONCOLOGY | Facility: CLINIC | Age: 74
End: 2024-10-14

## 2024-10-14 ENCOUNTER — APPOINTMENT (OUTPATIENT)
Dept: RADIATION ONCOLOGY | Facility: HOSPITAL | Age: 74
End: 2024-10-14
Attending: INTERNAL MEDICINE
Payer: MEDICARE

## 2024-10-14 PROCEDURE — 77387 GUIDANCE FOR RADJ TX DLVR: CPT | Performed by: RADIOLOGY

## 2024-10-14 PROCEDURE — 77014 CHG CT GUIDANCE RADIATION THERAPY FLDS PLACEMENT: CPT | Performed by: RADIOLOGY

## 2024-10-14 PROCEDURE — 77336 RADIATION PHYSICS CONSULT: CPT | Performed by: STUDENT IN AN ORGANIZED HEALTH CARE EDUCATION/TRAINING PROGRAM

## 2024-10-14 PROCEDURE — 77412 RADIATION TX DELIVERY LVL 3: CPT | Performed by: RADIOLOGY

## 2024-10-14 NOTE — PROGRESS NOTES
I reached out to Ayla now that she is on Tx to reassess for any barriers to care and offer any supportive services that may be needed. I left  with reason for my call including my direct phone number [phone].

## 2024-10-15 ENCOUNTER — APPOINTMENT (OUTPATIENT)
Dept: RADIATION ONCOLOGY | Facility: HOSPITAL | Age: 74
End: 2024-10-15
Attending: INTERNAL MEDICINE
Payer: MEDICARE

## 2024-10-15 PROCEDURE — 77014 CHG CT GUIDANCE RADIATION THERAPY FLDS PLACEMENT: CPT | Performed by: STUDENT IN AN ORGANIZED HEALTH CARE EDUCATION/TRAINING PROGRAM

## 2024-10-15 PROCEDURE — 77412 RADIATION TX DELIVERY LVL 3: CPT | Performed by: STUDENT IN AN ORGANIZED HEALTH CARE EDUCATION/TRAINING PROGRAM

## 2024-10-15 PROCEDURE — 77387 GUIDANCE FOR RADJ TX DLVR: CPT | Performed by: STUDENT IN AN ORGANIZED HEALTH CARE EDUCATION/TRAINING PROGRAM

## 2024-10-16 ENCOUNTER — APPOINTMENT (OUTPATIENT)
Dept: RADIATION ONCOLOGY | Facility: HOSPITAL | Age: 74
End: 2024-10-16
Attending: INTERNAL MEDICINE
Payer: MEDICARE

## 2024-10-16 ENCOUNTER — PATIENT OUTREACH (OUTPATIENT)
Dept: HEMATOLOGY ONCOLOGY | Facility: CLINIC | Age: 74
End: 2024-10-16

## 2024-10-16 ENCOUNTER — HOSPITAL ENCOUNTER (EMERGENCY)
Facility: HOSPITAL | Age: 74
Discharge: HOME | End: 2024-10-16
Attending: STUDENT IN AN ORGANIZED HEALTH CARE EDUCATION/TRAINING PROGRAM
Payer: MEDICARE

## 2024-10-16 ENCOUNTER — APPOINTMENT (EMERGENCY)
Dept: RADIOLOGY | Facility: HOSPITAL | Age: 74
End: 2024-10-16
Payer: MEDICARE

## 2024-10-16 ENCOUNTER — APPOINTMENT (EMERGENCY)
Dept: CT IMAGING | Facility: HOSPITAL | Age: 74
End: 2024-10-16
Payer: MEDICARE

## 2024-10-16 VITALS
TEMPERATURE: 98 F | HEART RATE: 71 BPM | SYSTOLIC BLOOD PRESSURE: 157 MMHG | RESPIRATION RATE: 16 BRPM | DIASTOLIC BLOOD PRESSURE: 79 MMHG | OXYGEN SATURATION: 97 %

## 2024-10-16 DIAGNOSIS — H53.461 QUADRANTANOPSIA, RIGHT: ICD-10-CM

## 2024-10-16 DIAGNOSIS — R51.9 ACUTE NONINTRACTABLE HEADACHE, UNSPECIFIED HEADACHE TYPE: Primary | ICD-10-CM

## 2024-10-16 DIAGNOSIS — I10 HYPERTENSION, UNSPECIFIED TYPE: ICD-10-CM

## 2024-10-16 DIAGNOSIS — R42 LIGHTHEADEDNESS: ICD-10-CM

## 2024-10-16 LAB
ANION GAP SERPL CALCULATED.3IONS-SCNC: 6 MMOL/L (ref 4–13)
APTT PPP: 29 SECONDS (ref 23–34)
BASOPHILS # BLD AUTO: 0 THOUSANDS/ΜL (ref 0–0.1)
BASOPHILS NFR BLD AUTO: 0 % (ref 0–1)
BUN SERPL-MCNC: 18 MG/DL (ref 5–25)
CALCIUM SERPL-MCNC: 8.7 MG/DL (ref 8.4–10.2)
CARDIAC TROPONIN I PNL SERPL HS: 4 NG/L
CHLORIDE SERPL-SCNC: 108 MMOL/L (ref 96–108)
CO2 SERPL-SCNC: 25 MMOL/L (ref 21–32)
CREAT SERPL-MCNC: 0.8 MG/DL (ref 0.6–1.3)
EOSINOPHIL # BLD AUTO: 0.02 THOUSAND/ΜL (ref 0–0.61)
EOSINOPHIL NFR BLD AUTO: 1 % (ref 0–6)
ERYTHROCYTE [DISTWIDTH] IN BLOOD BY AUTOMATED COUNT: 13.4 % (ref 11.6–15.1)
GFR SERPL CREATININE-BSD FRML MDRD: 73 ML/MIN/1.73SQ M
GLUCOSE SERPL-MCNC: 77 MG/DL (ref 65–140)
GLUCOSE SERPL-MCNC: 85 MG/DL (ref 65–140)
HCT VFR BLD AUTO: 26.7 % (ref 34.8–46.1)
HGB BLD-MCNC: 8.6 G/DL (ref 11.5–15.4)
IMM GRANULOCYTES # BLD AUTO: 0.04 THOUSAND/UL (ref 0–0.2)
IMM GRANULOCYTES NFR BLD AUTO: 2 % (ref 0–2)
INR PPP: 0.97 (ref 0.85–1.19)
LYMPHOCYTES # BLD AUTO: 0.21 THOUSANDS/ΜL (ref 0.6–4.47)
LYMPHOCYTES NFR BLD AUTO: 9 % (ref 14–44)
MCH RBC QN AUTO: 32.5 PG (ref 26.8–34.3)
MCHC RBC AUTO-ENTMCNC: 32.2 G/DL (ref 31.4–37.4)
MCV RBC AUTO: 101 FL (ref 82–98)
MONOCYTES # BLD AUTO: 0.47 THOUSAND/ΜL (ref 0.17–1.22)
MONOCYTES NFR BLD AUTO: 20 % (ref 4–12)
NEUTROPHILS # BLD AUTO: 1.58 THOUSANDS/ΜL (ref 1.85–7.62)
NEUTS SEG NFR BLD AUTO: 68 % (ref 43–75)
NRBC BLD AUTO-RTO: 0 /100 WBCS
PLATELET # BLD AUTO: 128 THOUSANDS/UL (ref 149–390)
PMV BLD AUTO: 8.3 FL (ref 8.9–12.7)
POTASSIUM SERPL-SCNC: 4.1 MMOL/L (ref 3.5–5.3)
PROTHROMBIN TIME: 13.6 SECONDS (ref 12.3–15)
RBC # BLD AUTO: 2.65 MILLION/UL (ref 3.81–5.12)
SODIUM SERPL-SCNC: 139 MMOL/L (ref 135–147)
WBC # BLD AUTO: 2.32 THOUSAND/UL (ref 4.31–10.16)

## 2024-10-16 PROCEDURE — 36415 COLL VENOUS BLD VENIPUNCTURE: CPT | Performed by: STUDENT IN AN ORGANIZED HEALTH CARE EDUCATION/TRAINING PROGRAM

## 2024-10-16 PROCEDURE — 71045 X-RAY EXAM CHEST 1 VIEW: CPT

## 2024-10-16 PROCEDURE — 93005 ELECTROCARDIOGRAM TRACING: CPT

## 2024-10-16 PROCEDURE — 99284 EMERGENCY DEPT VISIT MOD MDM: CPT

## 2024-10-16 PROCEDURE — 70450 CT HEAD/BRAIN W/O DYE: CPT

## 2024-10-16 PROCEDURE — 77014 CHG CT GUIDANCE RADIATION THERAPY FLDS PLACEMENT: CPT | Performed by: STUDENT IN AN ORGANIZED HEALTH CARE EDUCATION/TRAINING PROGRAM

## 2024-10-16 PROCEDURE — 84484 ASSAY OF TROPONIN QUANT: CPT | Performed by: STUDENT IN AN ORGANIZED HEALTH CARE EDUCATION/TRAINING PROGRAM

## 2024-10-16 PROCEDURE — 82948 REAGENT STRIP/BLOOD GLUCOSE: CPT

## 2024-10-16 PROCEDURE — 80048 BASIC METABOLIC PNL TOTAL CA: CPT | Performed by: STUDENT IN AN ORGANIZED HEALTH CARE EDUCATION/TRAINING PROGRAM

## 2024-10-16 PROCEDURE — 77412 RADIATION TX DELIVERY LVL 3: CPT | Performed by: STUDENT IN AN ORGANIZED HEALTH CARE EDUCATION/TRAINING PROGRAM

## 2024-10-16 PROCEDURE — 85730 THROMBOPLASTIN TIME PARTIAL: CPT | Performed by: STUDENT IN AN ORGANIZED HEALTH CARE EDUCATION/TRAINING PROGRAM

## 2024-10-16 PROCEDURE — 85610 PROTHROMBIN TIME: CPT | Performed by: STUDENT IN AN ORGANIZED HEALTH CARE EDUCATION/TRAINING PROGRAM

## 2024-10-16 PROCEDURE — 77387 GUIDANCE FOR RADJ TX DLVR: CPT | Performed by: STUDENT IN AN ORGANIZED HEALTH CARE EDUCATION/TRAINING PROGRAM

## 2024-10-16 PROCEDURE — 99285 EMERGENCY DEPT VISIT HI MDM: CPT | Performed by: STUDENT IN AN ORGANIZED HEALTH CARE EDUCATION/TRAINING PROGRAM

## 2024-10-16 PROCEDURE — 85025 COMPLETE CBC W/AUTO DIFF WBC: CPT | Performed by: STUDENT IN AN ORGANIZED HEALTH CARE EDUCATION/TRAINING PROGRAM

## 2024-10-16 NOTE — PROGRESS NOTES
ASSESSMENT      Are you having any side effects from your treatment?  -no    Are you eating and drinking properly?  -yes     Are you having any pain?  -yes- currently being controlled by palliative care Ayla expressed     Have needs changed for a palliative care referral?  -pt already established     Do you know when your upcoming appointments are?  -yes     Do you have a good support system?  -yes     Are you interested in any support groups?  - declined at this time    Are there any changes in barriers to care?  -no    Do you have any questions or concerns regarding your treatment plan?  -Not at this time. Ayla was very appreciative of my call.

## 2024-10-17 ENCOUNTER — APPOINTMENT (OUTPATIENT)
Dept: RADIATION ONCOLOGY | Facility: HOSPITAL | Age: 74
End: 2024-10-17
Attending: INTERNAL MEDICINE
Payer: MEDICARE

## 2024-10-17 LAB
ATRIAL RATE: 75 BPM
P AXIS: 39 DEGREES
PR INTERVAL: 126 MS
QRS AXIS: 31 DEGREES
QRSD INTERVAL: 72 MS
QT INTERVAL: 388 MS
QTC INTERVAL: 433 MS
T WAVE AXIS: 21 DEGREES
VENTRICULAR RATE: 75 BPM

## 2024-10-17 PROCEDURE — 93010 ELECTROCARDIOGRAM REPORT: CPT | Performed by: INTERNAL MEDICINE

## 2024-10-17 PROCEDURE — 77387 GUIDANCE FOR RADJ TX DLVR: CPT | Performed by: INTERNAL MEDICINE

## 2024-10-17 PROCEDURE — 77412 RADIATION TX DELIVERY LVL 3: CPT | Performed by: INTERNAL MEDICINE

## 2024-10-17 PROCEDURE — 77014 CHG CT GUIDANCE RADIATION THERAPY FLDS PLACEMENT: CPT | Performed by: INTERNAL MEDICINE

## 2024-10-17 NOTE — DISCHARGE INSTRUCTIONS
You were seen in the Emergency Department for: Headache, lightheadedness, and elevated blood pressure    Your workup today showed: No evidence that blood pressure was causing damage to your organs or bleeding in the brain    Your next steps should include: call today to make an appointment with your primary care provider in one week    Reasons to RETURN IMMEDIATELY to the Emergency Department: worsening symptoms, new numbness/tingling/weakness, changes in vision, difficulty breathing, temperature > 100.4 degrees, uncontrollable nausea/vomiting, or any other concerns.

## 2024-10-18 ENCOUNTER — APPOINTMENT (OUTPATIENT)
Dept: RADIATION ONCOLOGY | Facility: HOSPITAL | Age: 74
End: 2024-10-18
Attending: INTERNAL MEDICINE
Payer: MEDICARE

## 2024-10-18 PROCEDURE — 77387 GUIDANCE FOR RADJ TX DLVR: CPT | Performed by: STUDENT IN AN ORGANIZED HEALTH CARE EDUCATION/TRAINING PROGRAM

## 2024-10-18 PROCEDURE — 77412 RADIATION TX DELIVERY LVL 3: CPT | Performed by: STUDENT IN AN ORGANIZED HEALTH CARE EDUCATION/TRAINING PROGRAM

## 2024-10-18 PROCEDURE — 77014 CHG CT GUIDANCE RADIATION THERAPY FLDS PLACEMENT: CPT | Performed by: STUDENT IN AN ORGANIZED HEALTH CARE EDUCATION/TRAINING PROGRAM

## 2024-10-19 RX ORDER — SODIUM CHLORIDE 9 MG/ML
20 INJECTION, SOLUTION INTRAVENOUS ONCE
OUTPATIENT
Start: 2024-10-28

## 2024-10-20 ENCOUNTER — HOSPITAL ENCOUNTER (INPATIENT)
Facility: HOSPITAL | Age: 74
LOS: 1 days | DRG: 054 | End: 2024-10-21
Attending: EMERGENCY MEDICINE | Admitting: INTERNAL MEDICINE
Payer: MEDICARE

## 2024-10-20 ENCOUNTER — APPOINTMENT (EMERGENCY)
Dept: CT IMAGING | Facility: HOSPITAL | Age: 74
DRG: 054 | End: 2024-10-20
Payer: MEDICARE

## 2024-10-20 DIAGNOSIS — G93.6 VASOGENIC CEREBRAL EDEMA (HCC): ICD-10-CM

## 2024-10-20 DIAGNOSIS — R41.82 ALTERED MENTAL STATUS: Primary | ICD-10-CM

## 2024-10-20 DIAGNOSIS — D53.9 MACROCYTIC ANEMIA: ICD-10-CM

## 2024-10-20 LAB
2HR DELTA HS TROPONIN: 1 NG/L
ALBUMIN SERPL BCG-MCNC: 3.8 G/DL (ref 3.5–5)
ALP SERPL-CCNC: 45 U/L (ref 34–104)
ALT SERPL W P-5'-P-CCNC: 12 U/L (ref 7–52)
ANION GAP SERPL CALCULATED.3IONS-SCNC: 7 MMOL/L (ref 4–13)
APTT PPP: 29 SECONDS (ref 23–34)
AST SERPL W P-5'-P-CCNC: 12 U/L (ref 13–39)
BACTERIA UR QL AUTO: ABNORMAL /HPF
BILIRUB DIRECT SERPL-MCNC: 0.03 MG/DL (ref 0–0.2)
BILIRUB SERPL-MCNC: 0.25 MG/DL (ref 0.2–1)
BILIRUB UR QL STRIP: NEGATIVE
BUN SERPL-MCNC: 17 MG/DL (ref 5–25)
CALCIUM SERPL-MCNC: 9 MG/DL (ref 8.4–10.2)
CARDIAC TROPONIN I PNL SERPL HS: 3 NG/L
CARDIAC TROPONIN I PNL SERPL HS: 4 NG/L
CHLORIDE SERPL-SCNC: 106 MMOL/L (ref 96–108)
CLARITY UR: CLEAR
CO2 SERPL-SCNC: 27 MMOL/L (ref 21–32)
COLOR UR: COLORLESS
CREAT SERPL-MCNC: 0.93 MG/DL (ref 0.6–1.3)
ERYTHROCYTE [DISTWIDTH] IN BLOOD BY AUTOMATED COUNT: 13.6 % (ref 11.6–15.1)
FLUAV AG UPPER RESP QL IA.RAPID: NEGATIVE
FLUBV AG UPPER RESP QL IA.RAPID: NEGATIVE
GFR SERPL CREATININE-BSD FRML MDRD: 61 ML/MIN/1.73SQ M
GLUCOSE SERPL-MCNC: 151 MG/DL (ref 65–140)
GLUCOSE SERPL-MCNC: 156 MG/DL (ref 65–140)
GLUCOSE UR STRIP-MCNC: NEGATIVE MG/DL
HCT VFR BLD AUTO: 27.7 % (ref 34.8–46.1)
HGB BLD-MCNC: 8.9 G/DL (ref 11.5–15.4)
HGB UR QL STRIP.AUTO: ABNORMAL
INR PPP: 0.94 (ref 0.85–1.19)
KETONES UR STRIP-MCNC: NEGATIVE MG/DL
LEUKOCYTE ESTERASE UR QL STRIP: NEGATIVE
MAGNESIUM SERPL-MCNC: 1.9 MG/DL (ref 1.9–2.7)
MCH RBC QN AUTO: 32.6 PG (ref 26.8–34.3)
MCHC RBC AUTO-ENTMCNC: 32.1 G/DL (ref 31.4–37.4)
MCV RBC AUTO: 102 FL (ref 82–98)
NITRITE UR QL STRIP: NEGATIVE
NON-SQ EPI CELLS URNS QL MICRO: ABNORMAL /HPF
PH UR STRIP.AUTO: 6.5 [PH]
PLATELET # BLD AUTO: 148 THOUSANDS/UL (ref 149–390)
PMV BLD AUTO: 8.4 FL (ref 8.9–12.7)
POTASSIUM SERPL-SCNC: 3.8 MMOL/L (ref 3.5–5.3)
PROT SERPL-MCNC: 6.6 G/DL (ref 6.4–8.4)
PROT UR STRIP-MCNC: NEGATIVE MG/DL
PROTHROMBIN TIME: 13.2 SECONDS (ref 12.3–15)
RBC # BLD AUTO: 2.73 MILLION/UL (ref 3.81–5.12)
RBC #/AREA URNS AUTO: ABNORMAL /HPF
SARS-COV+SARS-COV-2 AG RESP QL IA.RAPID: NEGATIVE
SODIUM SERPL-SCNC: 140 MMOL/L (ref 135–147)
SP GR UR STRIP.AUTO: 1.03 (ref 1–1.03)
TSH SERPL DL<=0.05 MIU/L-ACNC: 1.72 UIU/ML (ref 0.45–4.5)
UROBILINOGEN UR STRIP-ACNC: <2 MG/DL
WBC # BLD AUTO: 3.15 THOUSAND/UL (ref 4.31–10.16)
WBC #/AREA URNS AUTO: ABNORMAL /HPF

## 2024-10-20 PROCEDURE — 93005 ELECTROCARDIOGRAM TRACING: CPT

## 2024-10-20 PROCEDURE — 87811 SARS-COV-2 COVID19 W/OPTIC: CPT

## 2024-10-20 PROCEDURE — 85027 COMPLETE CBC AUTOMATED: CPT

## 2024-10-20 PROCEDURE — 83735 ASSAY OF MAGNESIUM: CPT

## 2024-10-20 PROCEDURE — 70496 CT ANGIOGRAPHY HEAD: CPT

## 2024-10-20 PROCEDURE — 85730 THROMBOPLASTIN TIME PARTIAL: CPT

## 2024-10-20 PROCEDURE — 84443 ASSAY THYROID STIM HORMONE: CPT

## 2024-10-20 PROCEDURE — 96375 TX/PRO/DX INJ NEW DRUG ADDON: CPT

## 2024-10-20 PROCEDURE — 99291 CRITICAL CARE FIRST HOUR: CPT | Performed by: EMERGENCY MEDICINE

## 2024-10-20 PROCEDURE — 99285 EMERGENCY DEPT VISIT HI MDM: CPT

## 2024-10-20 PROCEDURE — 84484 ASSAY OF TROPONIN QUANT: CPT

## 2024-10-20 PROCEDURE — 80076 HEPATIC FUNCTION PANEL: CPT

## 2024-10-20 PROCEDURE — 82948 REAGENT STRIP/BLOOD GLUCOSE: CPT

## 2024-10-20 PROCEDURE — 36415 COLL VENOUS BLD VENIPUNCTURE: CPT

## 2024-10-20 PROCEDURE — 80048 BASIC METABOLIC PNL TOTAL CA: CPT

## 2024-10-20 PROCEDURE — 87040 BLOOD CULTURE FOR BACTERIA: CPT

## 2024-10-20 PROCEDURE — 87804 INFLUENZA ASSAY W/OPTIC: CPT

## 2024-10-20 PROCEDURE — 96374 THER/PROPH/DIAG INJ IV PUSH: CPT

## 2024-10-20 PROCEDURE — 96376 TX/PRO/DX INJ SAME DRUG ADON: CPT

## 2024-10-20 PROCEDURE — 85610 PROTHROMBIN TIME: CPT

## 2024-10-20 PROCEDURE — 80307 DRUG TEST PRSMV CHEM ANLYZR: CPT | Performed by: PSYCHIATRY & NEUROLOGY

## 2024-10-20 PROCEDURE — 81001 URINALYSIS AUTO W/SCOPE: CPT

## 2024-10-20 PROCEDURE — 70498 CT ANGIOGRAPHY NECK: CPT

## 2024-10-20 RX ORDER — LEVETIRACETAM 500 MG/5ML
2000 INJECTION, SOLUTION, CONCENTRATE INTRAVENOUS ONCE
Status: COMPLETED | OUTPATIENT
Start: 2024-10-20 | End: 2024-10-20

## 2024-10-20 RX ORDER — DEXAMETHASONE SODIUM PHOSPHATE 4 MG/ML
4 INJECTION, SOLUTION INTRA-ARTICULAR; INTRALESIONAL; INTRAMUSCULAR; INTRAVENOUS; SOFT TISSUE EVERY 6 HOURS SCHEDULED
Status: DISCONTINUED | OUTPATIENT
Start: 2024-10-21 | End: 2024-10-21 | Stop reason: HOSPADM

## 2024-10-20 RX ORDER — ACETAMINOPHEN 325 MG/1
1000 TABLET ORAL ONCE
Status: COMPLETED | OUTPATIENT
Start: 2024-10-20 | End: 2024-10-20

## 2024-10-20 RX ORDER — DEXAMETHASONE SODIUM PHOSPHATE 10 MG/ML
10 INJECTION, SOLUTION INTRAMUSCULAR; INTRAVENOUS EVERY 6 HOURS SCHEDULED
Status: DISCONTINUED | OUTPATIENT
Start: 2024-10-21 | End: 2024-10-20

## 2024-10-20 RX ORDER — DEXAMETHASONE SODIUM PHOSPHATE 10 MG/ML
6 INJECTION, SOLUTION INTRAMUSCULAR; INTRAVENOUS ONCE
Status: COMPLETED | OUTPATIENT
Start: 2024-10-20 | End: 2024-10-20

## 2024-10-20 RX ORDER — DEXAMETHASONE SODIUM PHOSPHATE 4 MG/ML
4 INJECTION, SOLUTION INTRA-ARTICULAR; INTRALESIONAL; INTRAMUSCULAR; INTRAVENOUS; SOFT TISSUE EVERY 6 HOURS SCHEDULED
Status: DISCONTINUED | OUTPATIENT
Start: 2024-10-20 | End: 2024-10-20

## 2024-10-20 RX ORDER — DEXAMETHASONE SODIUM PHOSPHATE 10 MG/ML
10 INJECTION, SOLUTION INTRAMUSCULAR; INTRAVENOUS ONCE
Status: DISCONTINUED | OUTPATIENT
Start: 2024-10-20 | End: 2024-10-20

## 2024-10-20 RX ADMIN — LEVETIRACETAM 2000 MG: 100 INJECTION, SOLUTION INTRAVENOUS at 21:44

## 2024-10-20 RX ADMIN — IOHEXOL 70 ML: 350 INJECTION, SOLUTION INTRAVENOUS at 18:39

## 2024-10-20 RX ADMIN — DEXAMETHASONE SODIUM PHOSPHATE 4 MG: 4 INJECTION INTRA-ARTICULAR; INTRALESIONAL; INTRAMUSCULAR; INTRAVENOUS; SOFT TISSUE at 21:20

## 2024-10-20 RX ADMIN — DEXAMETHASONE SODIUM PHOSPHATE 4 MG: 4 INJECTION INTRA-ARTICULAR; INTRALESIONAL; INTRAMUSCULAR; INTRAVENOUS; SOFT TISSUE at 23:26

## 2024-10-20 RX ADMIN — ACETAMINOPHEN 975 MG: 325 TABLET ORAL at 20:20

## 2024-10-20 RX ADMIN — DEXAMETHASONE SODIUM PHOSPHATE 6 MG: 10 INJECTION INTRAMUSCULAR; INTRAVENOUS at 21:42

## 2024-10-20 NOTE — QUICK NOTE
Stroke Alert    Called 6:28pm    74 y/o F with HTN, HLD, and known lung cancer with brain metastasis that presents with onset of confusion and possible aphasia. LKN is unclear however pt reportedly awoke at some point today and called her family at 5:15pm to tell them she was feeling off. When they found her confused they brought her to the hospital. Family reports pt has been confused like this in the past when steroids have been weaned down and this has happened recently. NIHSS 3 for inability to follow one command, apparent visual field deficit in one quadrant on the R (was seen for this previously), and tactile neglect.    HCT and CTA were personally reviewed - no evidence of acute ischemia/hemorrhage; extensive vasogenic edema in the L parieto-occipital region that appears stable from 4 days prior; no significant vascular abnormality.    Not a candidate for TNK given lower suspicion for stroke, intracranial metastasis, and unclear LKN. Unclear if this represents TME in the setting of known intracranial brain disease (as pt has reportedly had this before) or whether seizure may have also played a role with this representing a post-ictal state. Her more focal deficits can be attributed to her known metastatic disease/edema. Pt just had an MRI brain w/wo 12 days ago with overall stable intracranial disease. Do not feel she requires stroke pathway/repeat MRI at this time however defer to formal Neurology/Neurosurgery evaluation. Please contact Neurology with concern for seizure activity. Continue Keppra as prescribed. Toxic-metabolic and infectious w/u as per primary team.

## 2024-10-20 NOTE — ED ATTENDING ATTESTATION
10/20/2024  ILandon DO, saw and evaluated the patient. I have discussed the patient with the resident/non-physician practitioner and agree with the resident's/non-physician practitioner's findings, Plan of Care, and MDM as documented in the resident's/non-physician practitioner's note, except where noted. All available labs and Radiology studies were reviewed.  I was present for key portions of any procedure(s) performed by the resident/non-physician practitioner and I was immediately available to provide assistance.       At this point I agree with the current assessment done in the Emergency Department.  I have conducted an independent evaluation of this patient a history and physical is as follows:    Patient is a 73-year-old female with a history of lung cancer with brain mets who presents with altered mental status.  Patient has known brain mets and is on Keppra and oral steroids.  Family states that they are tapering her steroid and she developed altered mental status today.  Patient called sister-in-law stating that she was confused.  When sister-in-law and brother went over to her home, she did not know how to turn off the television and was confused about other simple tasks.  Patient states that she felt at her baseline earlier today.  She took a nap at an unknown time this afternoon and woke up with this confusion.  She has had similar episodes related to tapering of her p.o. steroid.  No trauma noted recently.    On exam, patient is awake and alert.  She is disoriented to time.  Cranial nerves II through XII grossly intact.  No objective motor or sensory deficits.  She seems to have neglect or spatial disorientation.  No clear limb ataxia.  Speech fluent.  Heart is regular rate and rhythm.  Breath sounds normal.  Abdomen is soft, nontender, nondistended.  No rebound or guarding.    Case discussed with neurology who reviewed imaging.  ET shows stable vasogenic edema.  CTA negative for  "hemodynamically significant stenosis, dissection or vascular occlusion.  Patient has contraindications to tenecteplase.  Will hospitalize for ongoing monitoring and neurology consult.    Patient is having progressive neurologic symptoms.  She is having more disorientation and seems to have expressive aphasia.  Case was discussed further with neurology and neurocritical care.  Will give drawn and Keppra.  Recommend continuous EEG.  Will discuss with neurocritical care at [facility] as well as [facility] to determine placement.    EMU physician spoke with neurocritical care attending at [facility] and they have decided that patient will stay at [facility] since rad onc team is on campus. EMU physician will be able to preop and EEG for 24-hour continuous monitoring.  Anticipate admission to [facility] for frequent neurochecks and continuous EEG.    Portions of the above record have been created with voice recognition software.  Occasional wrong word or \"sound alike\" substitutions may have occurred due to the inherent limitations of voice recognition software.  Read the chart carefully and recognize, using context, where substitutions may have occurred.      ED Course         Critical Care Time  CriticalCare Time    Date/Time: 10/20/2024 11:49 PM    Performed by: Landon Bland DO  Authorized by: Landon Bland DO    Critical care provider statement:     Critical care time (minutes):  30    Critical care time was exclusive of:  Separately billable procedures and treating other patients    Critical care was necessary to treat or prevent imminent or life-threatening deterioration of the following conditions:  CNS failure or compromise    Critical care was time spent personally by me on the following activities:  Blood draw for specimens, obtaining history from patient or surrogate, development of treatment plan with patient or surrogate, discussions with consultants, evaluation of patient's response to treatment, " examination of patient, interpretation of cardiac output measurements, ordering and performing treatments and interventions, ordering and review of laboratory studies, ordering and review of radiographic studies, re-evaluation of patient's condition and review of old charts  Comments:      Patient required IV Decadron and IV Keppra for progressive neurologic symptoms.  Required multiple conversations with intensivist and other consultants.

## 2024-10-21 ENCOUNTER — APPOINTMENT (INPATIENT)
Dept: NEUROLOGY | Facility: HOSPITAL | Age: 74
DRG: 054 | End: 2024-10-21
Payer: MEDICARE

## 2024-10-21 ENCOUNTER — HOSPITAL ENCOUNTER (INPATIENT)
Facility: HOSPITAL | Age: 74
LOS: 14 days | Discharge: HOME HEALTH | DRG: 080 | End: 2024-11-04
Attending: PSYCHIATRY & NEUROLOGY | Admitting: PSYCHIATRY & NEUROLOGY
Payer: MEDICARE

## 2024-10-21 ENCOUNTER — APPOINTMENT (INPATIENT)
Dept: RADIOLOGY | Facility: HOSPITAL | Age: 74
DRG: 080 | End: 2024-10-21
Payer: MEDICARE

## 2024-10-21 ENCOUNTER — APPOINTMENT (OUTPATIENT)
Dept: RADIATION ONCOLOGY | Facility: HOSPITAL | Age: 74
End: 2024-10-21
Attending: INTERNAL MEDICINE
Payer: MEDICARE

## 2024-10-21 VITALS
TEMPERATURE: 98.4 F | OXYGEN SATURATION: 96 % | BODY MASS INDEX: 21.85 KG/M2 | RESPIRATION RATE: 16 BRPM | DIASTOLIC BLOOD PRESSURE: 83 MMHG | SYSTOLIC BLOOD PRESSURE: 150 MMHG | WEIGHT: 119.49 LBS | HEART RATE: 71 BPM

## 2024-10-21 DIAGNOSIS — E78.2 MIXED HYPERLIPIDEMIA: ICD-10-CM

## 2024-10-21 DIAGNOSIS — C79.31 MALIGNANT NEOPLASM METASTATIC TO BRAIN (HCC): Primary | ICD-10-CM

## 2024-10-21 DIAGNOSIS — I10 PRIMARY HYPERTENSION: ICD-10-CM

## 2024-10-21 DIAGNOSIS — C49.5: ICD-10-CM

## 2024-10-21 DIAGNOSIS — G93.89 BRAIN MASS: ICD-10-CM

## 2024-10-21 DIAGNOSIS — C79.31 METASTATIC CANCER TO BRAIN (HCC): ICD-10-CM

## 2024-10-21 DIAGNOSIS — C79.31 METASTASIS TO BRAIN (HCC): ICD-10-CM

## 2024-10-21 DIAGNOSIS — U07.1 COVID-19: ICD-10-CM

## 2024-10-21 DIAGNOSIS — Z01.89 ENCOUNTER FOR COMPETENCY EVALUATION: ICD-10-CM

## 2024-10-21 PROBLEM — G93.40 ACUTE ENCEPHALOPATHY: Status: ACTIVE | Noted: 2024-10-21

## 2024-10-21 LAB
4HR DELTA HS TROPONIN: 7 NG/L
ALBUMIN SERPL BCG-MCNC: 3.9 G/DL (ref 3.5–5)
ALP SERPL-CCNC: 47 U/L (ref 34–104)
ALT SERPL W P-5'-P-CCNC: 12 U/L (ref 7–52)
AMMONIA PLAS-SCNC: 26 UMOL/L (ref 18–72)
AMPHETAMINES SERPL QL SCN: NEGATIVE
ANION GAP SERPL CALCULATED.3IONS-SCNC: 10 MMOL/L (ref 4–13)
ANISOCYTOSIS BLD QL SMEAR: PRESENT
AST SERPL W P-5'-P-CCNC: 12 U/L (ref 13–39)
ATRIAL RATE: 92 BPM
BARBITURATES UR QL: NEGATIVE
BASOPHILS # BLD AUTO: 0.01 THOUSANDS/ÂΜL (ref 0–0.1)
BASOPHILS NFR BLD AUTO: 0 % (ref 0–1)
BENZODIAZ UR QL: NEGATIVE
BILIRUB SERPL-MCNC: 0.27 MG/DL (ref 0.2–1)
BUN SERPL-MCNC: 12 MG/DL (ref 5–25)
CA-I BLD-SCNC: 1.21 MMOL/L (ref 1.12–1.32)
CALCIUM SERPL-MCNC: 9.4 MG/DL (ref 8.4–10.2)
CARDIAC TROPONIN I PNL SERPL HS: 10 NG/L
CHLORIDE SERPL-SCNC: 102 MMOL/L (ref 96–108)
CO2 SERPL-SCNC: 28 MMOL/L (ref 21–32)
COCAINE UR QL: NEGATIVE
CREAT SERPL-MCNC: 0.81 MG/DL (ref 0.6–1.3)
EOSINOPHIL # BLD AUTO: 0 THOUSAND/ÂΜL (ref 0–0.61)
EOSINOPHIL NFR BLD AUTO: 0 % (ref 0–6)
ERYTHROCYTE [DISTWIDTH] IN BLOOD BY AUTOMATED COUNT: 13.3 % (ref 11.6–15.1)
ETHANOL SERPL-MCNC: <10 MG/DL
FENTANYL UR QL SCN: NEGATIVE
FLUAV RNA RESP QL NAA+PROBE: NEGATIVE
FLUBV RNA RESP QL NAA+PROBE: NEGATIVE
GFR SERPL CREATININE-BSD FRML MDRD: 72 ML/MIN/1.73SQ M
GLUCOSE SERPL-MCNC: 153 MG/DL (ref 65–140)
HCT VFR BLD AUTO: 29 % (ref 34.8–46.1)
HGB BLD-MCNC: 9.5 G/DL (ref 11.5–15.4)
HYDROCODONE UR QL SCN: POSITIVE
IMM GRANULOCYTES # BLD AUTO: 0.06 THOUSAND/UL (ref 0–0.2)
IMM GRANULOCYTES NFR BLD AUTO: 2 % (ref 0–2)
LYMPHOCYTES # BLD AUTO: 0.09 THOUSANDS/ÂΜL (ref 0.6–4.47)
LYMPHOCYTES NFR BLD AUTO: 3 % (ref 14–44)
MACROCYTES BLD QL AUTO: PRESENT
MAGNESIUM SERPL-MCNC: 2.7 MG/DL (ref 1.9–2.7)
MCH RBC QN AUTO: 33.1 PG (ref 26.8–34.3)
MCHC RBC AUTO-ENTMCNC: 32.8 G/DL (ref 31.4–37.4)
MCV RBC AUTO: 101 FL (ref 82–98)
METHADONE UR QL: NEGATIVE
MONOCYTES # BLD AUTO: 0.09 THOUSAND/ÂΜL (ref 0.17–1.22)
MONOCYTES NFR BLD AUTO: 3 % (ref 4–12)
NEUTROPHILS # BLD AUTO: 2.79 THOUSANDS/ÂΜL (ref 1.85–7.62)
NEUTS SEG NFR BLD AUTO: 92 % (ref 43–75)
NRBC BLD AUTO-RTO: 0 /100 WBCS
OPIATES UR QL SCN: NEGATIVE
OXYCODONE+OXYMORPHONE UR QL SCN: NEGATIVE
P AXIS: 72 DEGREES
PCP UR QL: NEGATIVE
PHOSPHATE SERPL-MCNC: 3.9 MG/DL (ref 2.3–4.1)
PLATELET # BLD AUTO: 170 THOUSANDS/UL (ref 149–390)
PLATELET # BLD AUTO: 178 THOUSANDS/UL (ref 149–390)
PLATELET BLD QL SMEAR: ADEQUATE
PMV BLD AUTO: 8.7 FL (ref 8.9–12.7)
PMV BLD AUTO: 8.8 FL (ref 8.9–12.7)
POTASSIUM SERPL-SCNC: 4.5 MMOL/L (ref 3.5–5.3)
PR INTERVAL: 150 MS
PROT SERPL-MCNC: 7.1 G/DL (ref 6.4–8.4)
QRS AXIS: 57 DEGREES
QRSD INTERVAL: 64 MS
QT INTERVAL: 358 MS
QTC INTERVAL: 442 MS
RBC # BLD AUTO: 2.87 MILLION/UL (ref 3.81–5.12)
RBC MORPH BLD: PRESENT
RSV RNA RESP QL NAA+PROBE: NEGATIVE
SARS-COV-2 RNA RESP QL NAA+PROBE: NEGATIVE
SODIUM SERPL-SCNC: 140 MMOL/L (ref 135–147)
T WAVE AXIS: 54 DEGREES
THC UR QL: NEGATIVE
TSH SERPL DL<=0.05 MIU/L-ACNC: 0.54 UIU/ML (ref 0.45–4.5)
VENTRICULAR RATE: 92 BPM
WBC # BLD AUTO: 3.04 THOUSAND/UL (ref 4.31–10.16)

## 2024-10-21 PROCEDURE — 85049 AUTOMATED PLATELET COUNT: CPT

## 2024-10-21 PROCEDURE — 87040 BLOOD CULTURE FOR BACTERIA: CPT | Performed by: REGISTERED NURSE

## 2024-10-21 PROCEDURE — NC001 PR NO CHARGE: Performed by: ANESTHESIOLOGY

## 2024-10-21 PROCEDURE — 84443 ASSAY THYROID STIM HORMONE: CPT

## 2024-10-21 PROCEDURE — 80053 COMPREHEN METABOLIC PANEL: CPT | Performed by: REGISTERED NURSE

## 2024-10-21 PROCEDURE — 82077 ASSAY SPEC XCP UR&BREATH IA: CPT | Performed by: PSYCHIATRY & NEUROLOGY

## 2024-10-21 PROCEDURE — NC001 PR NO CHARGE: Performed by: INTERNAL MEDICINE

## 2024-10-21 PROCEDURE — 82140 ASSAY OF AMMONIA: CPT | Performed by: PSYCHIATRY & NEUROLOGY

## 2024-10-21 PROCEDURE — 99223 1ST HOSP IP/OBS HIGH 75: CPT | Performed by: SURGERY

## 2024-10-21 PROCEDURE — 95715 VEEG EA 12-26HR INTMT MNTR: CPT

## 2024-10-21 PROCEDURE — 85025 COMPLETE CBC W/AUTO DIFF WBC: CPT | Performed by: REGISTERED NURSE

## 2024-10-21 PROCEDURE — 99291 CRITICAL CARE FIRST HOUR: CPT | Performed by: PSYCHIATRY & NEUROLOGY

## 2024-10-21 PROCEDURE — 97167 OT EVAL HIGH COMPLEX 60 MIN: CPT

## 2024-10-21 PROCEDURE — 93010 ELECTROCARDIOGRAM REPORT: CPT | Performed by: INTERNAL MEDICINE

## 2024-10-21 PROCEDURE — 99223 1ST HOSP IP/OBS HIGH 75: CPT | Performed by: NURSE PRACTITIONER

## 2024-10-21 PROCEDURE — 83735 ASSAY OF MAGNESIUM: CPT | Performed by: REGISTERED NURSE

## 2024-10-21 PROCEDURE — 82330 ASSAY OF CALCIUM: CPT | Performed by: REGISTERED NURSE

## 2024-10-21 PROCEDURE — 70553 MRI BRAIN STEM W/O & W/DYE: CPT

## 2024-10-21 PROCEDURE — 95700 EEG CONT REC W/VID EEG TECH: CPT

## 2024-10-21 PROCEDURE — 0241U HB NFCT DS VIR RESP RNA 4 TRGT: CPT | Performed by: PSYCHIATRY & NEUROLOGY

## 2024-10-21 PROCEDURE — 84100 ASSAY OF PHOSPHORUS: CPT | Performed by: REGISTERED NURSE

## 2024-10-21 PROCEDURE — A9585 GADOBUTROL INJECTION: HCPCS | Performed by: INTERNAL MEDICINE

## 2024-10-21 PROCEDURE — 97163 PT EVAL HIGH COMPLEX 45 MIN: CPT

## 2024-10-21 PROCEDURE — 93005 ELECTROCARDIOGRAM TRACING: CPT

## 2024-10-21 RX ORDER — AMOXICILLIN 250 MG
2 CAPSULE ORAL 2 TIMES DAILY
Status: DISCONTINUED | OUTPATIENT
Start: 2024-10-21 | End: 2024-11-04 | Stop reason: HOSPADM

## 2024-10-21 RX ORDER — LEVETIRACETAM 500 MG/5ML
1000 INJECTION, SOLUTION, CONCENTRATE INTRAVENOUS EVERY 12 HOURS SCHEDULED
Status: DISCONTINUED | OUTPATIENT
Start: 2024-10-21 | End: 2024-10-28

## 2024-10-21 RX ORDER — SODIUM CHLORIDE 9 MG/ML
75 INJECTION, SOLUTION INTRAVENOUS CONTINUOUS
Status: DISCONTINUED | OUTPATIENT
Start: 2024-10-21 | End: 2024-10-21

## 2024-10-21 RX ORDER — MAGNESIUM SULFATE HEPTAHYDRATE 40 MG/ML
2 INJECTION, SOLUTION INTRAVENOUS
Status: DISCONTINUED | OUTPATIENT
Start: 2024-10-21 | End: 2024-10-21 | Stop reason: HOSPADM

## 2024-10-21 RX ORDER — ACETAMINOPHEN 325 MG/1
975 TABLET ORAL EVERY 8 HOURS PRN
Status: DISCONTINUED | OUTPATIENT
Start: 2024-10-21 | End: 2024-10-21

## 2024-10-21 RX ORDER — CHLORHEXIDINE GLUCONATE ORAL RINSE 1.2 MG/ML
15 SOLUTION DENTAL EVERY 12 HOURS SCHEDULED
Status: DISCONTINUED | OUTPATIENT
Start: 2024-10-21 | End: 2024-10-21 | Stop reason: HOSPADM

## 2024-10-21 RX ORDER — PANTOPRAZOLE SODIUM 40 MG/1
40 TABLET, DELAYED RELEASE ORAL
Status: DISCONTINUED | OUTPATIENT
Start: 2024-10-21 | End: 2024-11-04 | Stop reason: HOSPADM

## 2024-10-21 RX ORDER — CELECOXIB 200 MG/1
200 CAPSULE ORAL 2 TIMES DAILY
Status: DISCONTINUED | OUTPATIENT
Start: 2024-10-21 | End: 2024-10-21 | Stop reason: HOSPADM

## 2024-10-21 RX ORDER — KETOROLAC TROMETHAMINE 30 MG/ML
15 INJECTION, SOLUTION INTRAMUSCULAR; INTRAVENOUS EVERY 6 HOURS PRN
Status: DISCONTINUED | OUTPATIENT
Start: 2024-10-21 | End: 2024-10-22

## 2024-10-21 RX ORDER — KETOROLAC TROMETHAMINE 30 MG/ML
15 INJECTION, SOLUTION INTRAMUSCULAR; INTRAVENOUS ONCE
Status: COMPLETED | OUTPATIENT
Start: 2024-10-21 | End: 2024-10-21

## 2024-10-21 RX ORDER — SODIUM CHLORIDE 9 MG/ML
75 INJECTION, SOLUTION INTRAVENOUS CONTINUOUS
Status: DISCONTINUED | OUTPATIENT
Start: 2024-10-21 | End: 2024-10-21 | Stop reason: HOSPADM

## 2024-10-21 RX ORDER — KETOROLAC TROMETHAMINE 30 MG/ML
15 INJECTION, SOLUTION INTRAMUSCULAR; INTRAVENOUS EVERY 6 HOURS PRN
Status: DISCONTINUED | OUTPATIENT
Start: 2024-10-21 | End: 2024-10-21

## 2024-10-21 RX ORDER — CHLORHEXIDINE GLUCONATE ORAL RINSE 1.2 MG/ML
15 SOLUTION DENTAL EVERY 12 HOURS SCHEDULED
Status: DISCONTINUED | OUTPATIENT
Start: 2024-10-21 | End: 2024-10-21

## 2024-10-21 RX ORDER — ACETAMINOPHEN 325 MG/1
975 TABLET ORAL EVERY 8 HOURS SCHEDULED
Status: DISCONTINUED | OUTPATIENT
Start: 2024-10-21 | End: 2024-10-22

## 2024-10-21 RX ORDER — LEVETIRACETAM 500 MG/5ML
750 INJECTION, SOLUTION, CONCENTRATE INTRAVENOUS EVERY 12 HOURS SCHEDULED
Status: DISCONTINUED | OUTPATIENT
Start: 2024-10-21 | End: 2024-10-21 | Stop reason: HOSPADM

## 2024-10-21 RX ORDER — GADOBUTROL 604.72 MG/ML
5 INJECTION INTRAVENOUS
Status: COMPLETED | OUTPATIENT
Start: 2024-10-21 | End: 2024-10-21

## 2024-10-21 RX ORDER — ONDANSETRON 2 MG/ML
4 INJECTION INTRAMUSCULAR; INTRAVENOUS EVERY 4 HOURS PRN
Status: DISCONTINUED | OUTPATIENT
Start: 2024-10-21 | End: 2024-11-04 | Stop reason: HOSPADM

## 2024-10-21 RX ORDER — DEXAMETHASONE SODIUM PHOSPHATE 10 MG/ML
6 INJECTION, SOLUTION INTRAMUSCULAR; INTRAVENOUS EVERY 6 HOURS SCHEDULED
Status: COMPLETED | OUTPATIENT
Start: 2024-10-21 | End: 2024-10-23

## 2024-10-21 RX ORDER — MAGNESIUM SULFATE HEPTAHYDRATE 40 MG/ML
2 INJECTION, SOLUTION INTRAVENOUS ONCE
Status: COMPLETED | OUTPATIENT
Start: 2024-10-21 | End: 2024-10-21

## 2024-10-21 RX ORDER — ENOXAPARIN SODIUM 100 MG/ML
40 INJECTION SUBCUTANEOUS DAILY
Status: DISCONTINUED | OUTPATIENT
Start: 2024-10-21 | End: 2024-10-21 | Stop reason: HOSPADM

## 2024-10-21 RX ORDER — ACETAMINOPHEN 325 MG/1
975 TABLET ORAL EVERY 8 HOURS SCHEDULED
Status: DISCONTINUED | OUTPATIENT
Start: 2024-10-21 | End: 2024-10-21

## 2024-10-21 RX ORDER — LEVOTHYROXINE SODIUM 50 UG/1
50 TABLET ORAL
Status: DISCONTINUED | OUTPATIENT
Start: 2024-10-21 | End: 2024-11-04 | Stop reason: HOSPADM

## 2024-10-21 RX ADMIN — PANTOPRAZOLE SODIUM 40 MG: 40 TABLET, DELAYED RELEASE ORAL at 11:44

## 2024-10-21 RX ADMIN — KETOROLAC TROMETHAMINE 15 MG: 30 INJECTION, SOLUTION INTRAMUSCULAR at 11:01

## 2024-10-21 RX ADMIN — DEXAMETHASONE SODIUM PHOSPHATE 6 MG: 10 INJECTION INTRAMUSCULAR; INTRAVENOUS at 23:55

## 2024-10-21 RX ADMIN — DEXAMETHASONE SODIUM PHOSPHATE 6 MG: 10 INJECTION INTRAMUSCULAR; INTRAVENOUS at 11:44

## 2024-10-21 RX ADMIN — DEXAMETHASONE SODIUM PHOSPHATE 6 MG: 10 INJECTION INTRAMUSCULAR; INTRAVENOUS at 06:00

## 2024-10-21 RX ADMIN — SODIUM CHLORIDE 75 ML/HR: 0.9 INJECTION, SOLUTION INTRAVENOUS at 00:39

## 2024-10-21 RX ADMIN — SODIUM CHLORIDE 75 ML/HR: 0.9 INJECTION, SOLUTION INTRAVENOUS at 04:33

## 2024-10-21 RX ADMIN — LEVETIRACETAM 1000 MG: 100 INJECTION, SOLUTION INTRAVENOUS at 08:43

## 2024-10-21 RX ADMIN — LEVETIRACETAM 1000 MG: 100 INJECTION, SOLUTION INTRAVENOUS at 21:08

## 2024-10-21 RX ADMIN — ACETAMINOPHEN 975 MG: 325 TABLET ORAL at 04:43

## 2024-10-21 RX ADMIN — MAGNESIUM SULFATE HEPTAHYDRATE 2 G: 40 INJECTION, SOLUTION INTRAVENOUS at 04:47

## 2024-10-21 RX ADMIN — GADOBUTROL 5 ML: 604.72 INJECTION INTRAVENOUS at 20:43

## 2024-10-21 RX ADMIN — CHLORHEXIDINE GLUCONATE 0.12% ORAL RINSE 15 ML: 1.2 LIQUID ORAL at 08:43

## 2024-10-21 RX ADMIN — ACETAMINOPHEN 975 MG: 325 TABLET, FILM COATED ORAL at 22:13

## 2024-10-21 RX ADMIN — MAGNESIUM SULFATE HEPTAHYDRATE 2 G: 40 INJECTION, SOLUTION INTRAVENOUS at 01:00

## 2024-10-21 RX ADMIN — DEXAMETHASONE SODIUM PHOSPHATE 6 MG: 10 INJECTION INTRAMUSCULAR; INTRAVENOUS at 17:26

## 2024-10-21 RX ADMIN — ACETAMINOPHEN 975 MG: 325 TABLET ORAL at 15:00

## 2024-10-21 NOTE — PLAN OF CARE
Problem: OCCUPATIONAL THERAPY ADULT  Goal: Performs self-care activities at highest level of function for planned discharge setting.  See evaluation for individualized goals.  Description: Treatment Interventions: ADL retraining, Functional transfer training, Endurance training, Cognitive reorientation, Patient/family training, Equipment evaluation/education, Compensatory technique education, Activityengagement          See flowsheet documentation for full assessment, interventions and recommendations.   Note: Limitation: Decreased ADL status, Decreased Safe judgement during ADL, Decreased cognition, Decreased endurance, Decreased self-care trans, Decreased high-level ADLs  Prognosis: Good  Assessment: Pt is a 73 y.o. female who was admitted to [facility] on 10/21/2024 with Metastatic cancer to brain (HCC) /change of MS, confusion and difficulty speaking. Patient  has a past medical history of Allergic, Cancer (HCC), Cancer (HCC), Cancer (HCC), Cataract, Essential thrombocytosis (HCC), Hyperlipidemia, Hypertension, Mass in chest, Nodular goiter, Osteoporosis, Pneumonia, Psoriasis, and SIRS (systemic inflammatory response syndrome) (HCC).   At baseline pt was completing adls and mobility independently w/o ad - denies falls - family assists with iadls/transportation. Pt lives alone in 2 story home with 2nd floor bed/bath and 8 HARRY - family checks on pt regularly. Currently pt requires min assist for overall ADLS and min assist for functional mobility/transfers. Pt currently presents with impairments in the following categories -steps to enter environment, limited home support, difficulty performing ADLS, difficulty performing IADLS , limited insight into deficits, and environment activity tolerance, endurance, standing balance/tolerance, sitting balance/tolerance, memory, insight, safety , judgement , and attention . These impairments, as well as pt's fatigue, decreased caregiver support, risk for falls,  and home environment  limit pt's ability to safely engage in all baseline areas of occupation, includinggrooming, bathing, dressing, toileting, functional mobility/transfers, community mobility, laundry , house maintenance, medication management, meal prep, cleaning, social participation , and leisure activities  From OT standpoint, recommend Level I resources upon D/C. OT will continue to follow to address the below stated goals.     Rehab Resource Intensity Level, OT: I (Maximum Resource Intensity)

## 2024-10-21 NOTE — PHYSICAL THERAPY NOTE
PHYSICAL THERAPY NOTE          Patient Name: Ayla Nye  Today's Date: 10/21/2024     10/21/24 1406   PT Last Visit   PT Visit Date 10/21/24   Note Type   Note type Evaluation   Pain Assessment   Pain Assessment Tool 0-10   Pain Score No Pain   Restrictions/Precautions   Weight Bearing Precautions Per Order No   Other Precautions Cognitive;Chair Alarm;Bed Alarm;Multiple lines;Fall Risk   Home Living   Type of Home House   Home Layout Two level;Bed/bath upstairs;Stairs to enter with rails  (8 steps to enter with rail)   Bathroom Shower/Tub Tub/shower unit   Bathroom Toilet Standard   Bathroom Equipment Grab bars in shower;Grab bars around toilet   Home Equipment   (No DME)   Prior Function   Level of [place] Independent with ADLs;Independent with functional mobility;Needs assistance with IADLS   Lives With (S)  Alone   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   IADLs Independent with meal prep;Independent with medication management;Family/Friend/Other provides transportation   Falls in the last 6 months 0   Vocational Retired   Cognition   Orientation Level Oriented X4   Subjective   Subjective Pt agreeable to participation in therapy   RLE Assessment   RLE Assessment WFL   LLE Assessment   LLE Assessment WFL   Coordination   Movements are Fluid and Coordinated 0   Coordination and Movement Description movements not fluid   Bed Mobility   Supine to Sit 4  Minimal assistance   Sit to Supine Unable to assess   Additional Comments pt in bedside recliner at end of session   Transfers   Sit to Stand 4  Minimal assistance   Stand to Sit 4  Minimal assistance   Ambulation/Elevation   Gait pattern Decreased foot clearance;Shuffling;Short stride   Gait Assistance 4  Minimal assist   Assistive Device   (none used for bed to chair transfer, use AD as needed for extended ambulation)   Distance 3' bed to chair   Stair Management Assistance Not tested    Balance   Static Sitting Fair   Dynamic Sitting Fair -   Static Standing Poor +   Dynamic Standing Poor   Ambulatory Poor   Endurance Deficit   Endurance Deficit Yes   Endurance Deficit Description limited by muscular endurance   Activity Tolerance   Activity Tolerance Patient limited by fatigue   Medical Staff Made Aware OT present for co-evaluation   Nurse Made Aware Nursing cleared pt for therapy   Assessment   Prognosis Good   Problem List Decreased strength;Decreased endurance;Impaired balance;Decreased mobility;Decreased coordination;Decreased cognition   Assessment Pt is a 73 y.o. female who presented to [facility] with altered mental status after decreasing steroid use for management of brain metastases. Family reports there have been multiple instances of this when attempting to wean off steroids. Pt was transferred from [facility] to [facility] Pt  has a past medical history of Allergic, Cancer (HCC), Cancer (HCC), Cancer (HCC), Cataract, Essential thrombocytosis (HCC), Hyperlipidemia, Hypertension, Mass in chest, Nodular goiter, Osteoporosis, Pneumonia, Psoriasis, and SIRS (systemic inflammatory response syndrome) (HCC).. Pt is alert and oriented, able to answer questions about home setup and prior level of function. Prior to admission, pt reports being independent with ADLs and needing assistance with IADLs. Currently pt is presenting as Min assist for bed mobility, transfers, and gait. Pt would benefit from continued therapy to address deficits in strength, endurance, and balance as well as trialing extended ambulation and stair negotiation. At this time PT is recommending level 1 resources upon discharge.   Goals   Patient Goals to go home   STG Expiration Date 11/04/24   Short Term Goal #1 1) Pt will perform all bed mobility and transfers with supervision to increase independence and ability to participate in ADLs, 2) pt will be able to ambulate 250' or more with least restrictive AD to increase muscular endurance and  ability to ambulate in community, 3) pt able to negotiate full flight of stairs to allow pt to navigate home environment, 4) Pt will improve balance to fair+ or better to improve patient safety and decrease risk of future falls   Plan   Treatment/Interventions ADL retraining;Functional transfer training;Endurance training;Bed mobility;Gait training;Spoke to nursing;OT   PT Frequency 3-5x/wk   Discharge Recommendation   Rehab Resource Intensity Level, PT I (Maximum Resource Intensity)   AM-PAC Basic Mobility Inpatient   Turning in Flat Bed Without Bedrails 3   Lying on Back to Sitting on Edge of Flat Bed Without Bedrails 3   Moving Bed to Chair 3   Standing Up From Chair Using Arms 3   Walk in Room 2   Climb 3-5 Stairs With Railing 2   Basic Mobility Inpatient Raw Score 16   Basic Mobility Standardized Score 38.32   [facility] Highest Level Of Mobility   -HLM Goal 5: Stand one or more mins   -HLM Achieved 4: Move to chair/commode   End of Consult   Patient Position at End of Consult Bedside chair;Bed/Chair alarm activated;All needs within reach   Orlin French, SPT

## 2024-10-21 NOTE — CONSULTS
Consultation - Neurosurgery   Name: Ayla Nye 73 y.o. female I MRN: [id number]  Unit/Bed#: ICU 10 I Date of Admission: 10/21/2024   Date of Service: 10/21/2024 I Hospital Day: 0   Inpatient consult to Neurosurgery  Consult performed by: ZLUEYMA Bradshaw  Consult ordered by: Marika Partida MD        Physician Requesting Evaluation: Marika Partida MD   Reason for Evaluation / Principal Problem: Brain mets    Assessment & Plan  Metastatic cancer to brain (HCC)  Known brain metastasis  History of NSC lung adenocarcinoma diagnosed in March 2024, status post chemo/RT.  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  Also diagnosed with mets to brain in July 2024, recommended whole brain radiation.  Ultimately she underwent SRS on 8/8/2024 to 5 enhancing lesions.  In the past attempted to taper steroids which led to neurological symptoms  Was admitted in September 2024 with AMS found to have increased cerebellar vasogenic edema after recent steroid taper.  Per chart review concern for possible leptomeningeal mets, underwent an LP which was negative for malignancy.  Also noted to have new tiny temporal lobe lesion.  As an outpatient she was on a Decadron taper per radiation oncology.  Patient's daughter states yesterday she was very confused and brought into the ED when she had difficulty speaking.  Of note she underwent MRI brain on 10/8/2024 which showed slight increase in size of left temporal lobe lesion with unchanged in 3 lesions and decreased size into other lesions with persistent diffuse vasogenic edema on left.    Imaging reviewed with :    CT head wo 10/20/24: Stable vasogenic edema in the left parietal temporal region. Previously demonstrated left posterior parietal lesion is less well-visualized likely due to differences in technique.     CTA head and neck w/wo 10/20/24:No hemodynamically significant stenosis, dissection or  occlusion of the carotid or vertebral arteries or major vessels of the [place].    Plan:  Exam:GCS 15, no strength or sensory deficits. FTN discoordination B/L  Continue frequent neurological checks.   Reviewed imaging with patient and daughter at bedside  STAT CT head with any neurological decline including drop GCS of 2pts within 1 hr.  Recommend SBP <160mmHg.  MRI brain with and without pending to further evaluate known brain metastasis  On Decadron 6 mg every 6 hours   Recommend CT chest abdomen pelvis to assess for possible other underlying new lesions  Neurology following, input appreciated  Patient's home Keppra dose was increased to 1000 mg twice daily for concern for seizure  vEEG pending  Hold all antiplatelet and anticoagulation medications.   Medical management pain control per primary team  Per NCC BCs x 2 pending, WBCs 3.04.  Platelets 178  No emergent neurosurgical intervention warranted  Mobilize with PT/OT  DVT PPX: SCDs     Neurosurgery will continue to follow and review MRI once completed, call with any further question or concerns.         History of Present Illness   HPI: Ayla Nye is a 73 y.o.  female with a past medical history significant for cataracts, thrombocytopenia, hyperlipidemia, hypertension, osteoporosis, psoriasis, and known NSC adenocarcinoma of lung diagnosed in March 2024 status post chemo/RT.  Found to have brain metastasis in July 2024 undergoing SRS in August.  In the past when steroids were attempted to be tapered lead to neurological symptoms.  Most recently she was admitted in September 2024 found to have increased cerebellar vasogenic edema as well as new tiny temporal lobe lesion there was some concern for possible leptomeningeal metastasis ultimately she underwent an LP which was negative for malignancy.  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    Patient was seen in the ER on 10/16 with complaints of a headache, dizziness and elevated blood pressure.  She underwent a CT head which showed left parietal occipital vasogenic edema with known intracranial metastatic disease and was ultimately sent home.    She again presented to the hospital yesterday afternoon with altered mental status.  Per chart review patient did not know how to turn off her television and was confused about simple tasks.     Per daughter who is in room patient felt like her baseline earlier yesterday but woke up from a nap and called her sister-in-law and said she was confused when her sister-in-law and brother went over to her home and brought her to the hospital.  No reported trauma.  She has had similar episodes like this but not this severe related to tapering of her steroids in the past.  In the ER she had progressive neurological symptoms she became more disoriented with expressive aphasia.  Concerns for possible seizure and she was ultimately transferred to [facility] ICU for closer monitoring and further evaluation.  She was loaded with Keppra and Decadron in the ED.    Per patient she states she is still confused and is complaining of a a 4-5/10 dull frontal headache.  Patient states she lives at home alone and her family checks on her frequently.  She continues to have some expressive aphasia but she is able to identify 3 objects.  Of note per patient's daughter peripheral vision gone for at least 2 months now.    Review of Systems   Constitutional:  Negative for activity change, chills and fever.   HENT:  Negative for tinnitus and trouble swallowing.    Eyes:  Positive for visual disturbance.   Respiratory:  Negative for shortness of  breath.    Cardiovascular:  Negative for chest pain.   Gastrointestinal:  Negative for abdominal pain, constipation, diarrhea, nausea and vomiting.   Genitourinary:  Negative for difficulty urinating.   Musculoskeletal:  Negative for gait problem.   Neurological:  Positive for headaches. Negative for dizziness, speech difficulty, weakness, light-headedness and numbness.   Hematological:  Does not bruise/bleed easily.   Psychiatric/Behavioral:  Positive for confusion.        Temp:  [97.9 °F (36.6 °C)-98.9 °F (37.2 °C)] 97.9 °F (36.6 °C)  HR:  [64-97] 66  BP: (105-213)/() 105/60  Resp:  [14-29] 17  SpO2:  [94 %-99 %] 98 %  O2 Device: None (Room air)    Physical Exam  Vitals reviewed.   Constitutional:       General: She is not in acute distress.     Appearance: Normal appearance. She is not ill-appearing.   HENT:      Head: Normocephalic and atraumatic.   Eyes:      Conjunctiva/sclera: Conjunctivae normal.      Pupils: Pupils are equal, round, and reactive to light.      Comments: EOM intact except can't cross midline when looking to the Left and can look at me when standing on her left side   Cardiovascular:      Rate and Rhythm: Normal rate.   Pulmonary:      Effort: Pulmonary effort is normal. No respiratory distress.   Chest:      Chest wall: No tenderness.   Abdominal:      General: There is no distension.      Palpations: Abdomen is soft.      Tenderness: There is no abdominal tenderness.   Musculoskeletal:         General: Normal range of motion.      Cervical back: Normal range of motion and neck supple.   Skin:     General: Skin is warm and dry.   Neurological:      Mental Status: She is oriented to person, place, and time.      Motor: Motor strength is normal.     Deep Tendon Reflexes:      Reflex Scores:       Bicep reflexes are 1+ on the right side and 1+ on the left side.       Patellar reflexes are 1+ on the right side and 1+ on the left side.  Psychiatric:         Attention and Perception:  Attention and perception normal.         Mood and Affect: Mood and affect normal.         Behavior: Behavior normal. Behavior is cooperative.         Thought Content: Thought content normal.      Comments: Some expressive aphasia with confusion      Neurological Exam  Mental Status  Awake, alert and oriented to person, place and time. Oriented to person, place, and time. Expressive aphasia present.    Cranial Nerves  CN II: Vision test: EOM intact except can't cross midline when looking to the Left and can look at me when standing on her left side.  CN III, IV, VI: Pupils equal round and reactive to light bilaterally.  CN V: Facial sensation is normal.  CN VII: Full and symmetric facial movement.  CN VIII: Hearing is normal.  CN XI: Shoulder shrug strength is normal.  CN XII: Tongue midline without atrophy or fasciculations.    Motor  Normal muscle bulk throughout. Normal muscle tone. Strength is 5/5 throughout all four extremities.    Sensory  Sensation is intact to light touch, pinprick, vibration and proprioception in all four extremities.    Reflexes                                            Right                      Left  Biceps                                 1+                         1+  Patellar                                1+                         1+    Coordination  Right: Rapid alternating movement abnormality:Left: Rapid alternating movement abnormality:  RTN discoordination.        Lab Results: I have reviewed the following results:  Recent Labs     10/20/24  1830 10/20/24  2030 10/21/24  0038 10/21/24  0448   WBC 3.15*  --   --  3.04*   HGB 8.9*  --   --  9.5*   HCT 27.7*  --   --  29.0*   *  --    < > 178   SODIUM 140  --   --  140   K 3.8  --   --  4.5     --   --  102   CO2 27  --   --  28   BUN 17  --   --  12   CREATININE 0.93  --   --  0.81   GLUC 151*  --   --  153*   CAIONIZED  --   --   --  1.21   MG 1.9  --   --  2.7   PHOS  --   --   --  3.9   AST 12*  --   --  12*   ALT  12  --   --  12   ALB 3.8  --   --  3.9   TBILI 0.25  --   --  0.27   ALKPHOS 45  --   --  47   PTT 29  --   --   --    INR 0.94  --   --   --    HSTNI0 3  --   --   --    HSTNI2  --  4  --   --     < > = values in this interval not displayed.       Imaging Results Review: I personally reviewed the following image studies in PACS and associated radiology reports: CT head. My interpretation of the radiology images/reports is:  .      VTE Pharmacologic Prophylaxis: Sequential compression device (Venodyne)

## 2024-10-21 NOTE — CASE MANAGEMENT
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     Palliative care patient 2024     Cancer related pain 2024     Goals of care, counseling/discussion 2024     Right hip pain 09/10/2024     Altered mental status 2024     Hypothyroidism 2024     Abnormal imaging of central nervous system 2024     Acute metabolic encephalopathy 2024     Malignant neoplasm metastatic to brain (HCC) 2024     Brain mass 2024     Metastatic cancer to brain (HCC) 2024     Visual disturbance 2024     Dehydration 2024     Moderate protein-calorie malnutrition (HCC) 2024     Bilateral leg pain 2024     Insomnia 2024     Malignant neoplasm of upper lobe, right bronchus or lung (HCC) 2024     Age-related osteoporosis without current pathological fracture 2023     Encounter for monitoring of hydroxyurea therapy 2023     JAK2 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 2023     Hypertension 08/10/2022     Mixed hyperlipidemia 08/10/2022     Essential thrombocytosis (HCC) 2020     TB lung, 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     Psoriatic arthritis (HCC) 09/10/2019       LOS (days): 0  Geometric Mean LOS (GMLOS) (days): 2.8  Days to GMLOS:2.6     OBJECTIVE:    Risk of Unplanned Readmission Score: 26.94         Current admission status: Inpatient       Preferred Pharmacy:   [id number]  [place] - [address] [place] [address]  Phone: [phone] Fax: [phone]    Primary Care Provider: Rafy Angelo MD    Primary  Insurance: MEDICARE  Secondary Insurance: AARP    ASSESSMENT:  Active Health Care Proxies    There are no active Health Care Proxies on file.                 Readmission Root Cause  30 Day Readmission: No    Patient Information  Admitted from:: Home  Mental Status: Alert  During Assessment patient was accompanied by: Daughter  Assessment information provided by:: Daughter  Support Systems: Daughter, Family members  Home entry access options. Select all that apply.: Stairs  Number of steps to enter home.: 4  Do the steps have railings?: No  Type of Current Residence: 2 story home  Upon entering residence, is there a bedroom on the main floor (no further steps)?: No  A bedroom is located on the following floor levels of residence (select all that apply):: 2nd Floor  Upon entering residence, is there a bathroom on the main floor (no further steps)?: No  Indicate which floors of current residence have a bathroom (select all the apply):: 2nd Floor  Number of steps to 2nd floor from main floor: One Flight  Living Arrangements: Lives Alone    Activities of Daily Living Prior to Admission  Functional Status: Independent  Completes ADLs independently?: Yes  Ambulates independently?: Yes  Does patient use assisted devices?: No  Does patient currently own DME?: No  Does patient have a history of Outpatient Therapy (PT/OT)?: No  Does the patient have a history of Short-Term Rehab?: No  Does patient have a history of HHC?: No  Does patient currently have HHC?: No         Patient Information Continued  Income Source: Pension/alf  Does patient have prescription coverage?: Yes  Does patient have a history of substance abuse?: No  Does patient have a history of Mental Health Diagnosis?: No         Means of Transportation  [facility] Family transport      Social Determinants of Health (SDOH)      Flowsheet Row Most Recent Value   Housing Stability    In the last 12 months, was there a time when you were not  able to pay the mortgage or rent on time? N   In the past 12 months, how many times have you moved where you were living? 0   At any time in the past 12 months, were you homeless or living in a shelter (including now)? N   Transportation Needs    In the past 12 months, has lack of transportation kept you from medical appointments or from getting medications? no   In the past 12 months, has lack of transportation kept you from meetings, work, or from getting things needed for daily living? No   Food Insecurity    Within the past 12 months, you worried that your food would run out before you got the money to buy more. Never true   Within the past 12 months, the food you bought just didn't last and you didn't have money to get more. Never true   Utilities    In the past 12 months has the electric, gas, oil, or water company threatened to shut off services in your home? No            DISCHARGE DETAILS:    Discharge planning discussed with:: patient & daughter Juliana  Freedom of Choice: Yes     CM contacted family/caregiver?: Yes  Were Treatment Team discharge recommendations reviewed with patient/caregiver?: Yes  Did patient/caregiver verbalize understanding of patient care needs?: Yes  Were patient/caregiver advised of the risks associated with not following Treatment Team discharge recommendations?: Yes    Contacts  Patient Contacts: Juliana  (daughter)  Relationship to Patient:: Family  Contact Method: Phone  Phone Number: [phone]  Reason/Outcome: Emergency Contact, Discharge Planning       Met with pt & daughter Juliana at bedside & explained CM role. Pt lives alone in 2 sty home with bed & bath on 2nd floor. IPTA. Has support from family & neighbors. No dme. Family transports. CM to follow for dc needs.

## 2024-10-21 NOTE — H&P
H&P - Critical Care/ICU   Name: Ayla Nye 73 y.o. female I MRN: [id number]  Unit/Bed#: ICU 14 I Date of Admission: 10/20/2024   Date of Service: 10/21/2024 I Hospital Day: 1       Assessment & Plan  Acute encephalopathy  Acute encephalopathy secondary to seizures versus vasogenic edema.  CT head shows stable vasogenic edema in the left parietal temporal region.  Neurology consulted with recommendations to pursue video EEG for determination of seizure.  Neurocritical care consulted who recommended we perform a stat MRI  Initial read of EEG shows slowing in the left hemispheric slowing, but no seizure activity.  NS 75/h was started with the sodium goal of 145.  Decadron 10 mg was given in the ED.  Patient will be transferred to [place] for stat MRI.  Metastatic cancer to brain (HCC)  On 07/29, patient was found to have 5 intracranial metastatic brain lesions.  Last dose of SRS was on 8/8.  Loading dose of Keppra 2000 mg was given in the ED  Home dose of Keppra 750 mg twice daily was initiated  Will consult with hematology oncology at [place] for continued recommendations.  Malignant neoplasm of upper lobe, right bronchus or lung (HCC)  Stage IIIb non-small cell lung cancer of the right upper lobe.  Status post radiation therapy completed on 7/5/2024.   Will be consulting with radiology oncology for continued plan.  Hypothyroidism  Patient started recently on levothyroxine 50 mg.  TSH on 9/13 was 16.3.  Disposition: Critical care    History of Present Illness   Ayla Nye is a 73 y.o. PMH lung cancer with brain metastases who presents to the ICU due to worsening mental status, bilateral neglect and new Broca's aphasia. The patient originally presented to the ED with due to confusion. Neurology saw her at 1830 30 min after arrival and felt that her presentation could be due to post-ictal state and recommended 24 hour video EEG. On my examination at 2300, patient exhibited severe word finding aphasia,  neglect of bilateral extremities and inability to perform eye tracking.  Patient's daughter informed us that she has had periods of worsening mental status when her home dose of Decadron is decreased.  Patient attests to confusion, but denies pain, shortness of breath, abdominal pain.     History obtained from daughter, chart review, and unobtainable from patient due to mental status.  Review of Systems: See HPI for Review of Systems    Historical Information   Past Medical History:  2022: Allergic      Comment:  Allergic to neomiacin and cephalexin  No date: Cancer (HCC)      Comment:  lung cancer  No date: Cancer (HCC)      Comment:  mets to brain  No date: Cancer (HCC)      Comment:  perianal mass  Nov. 2023: Cataract      Comment:  Due to have durgery June 2024  No date: Essential thrombocytosis (HCC)      Comment:  controlled with medication  No date: Hyperlipidemia  No date: Hypertension  No date: Mass in chest  No date: Nodular goiter  No date: Osteoporosis  Oct 16, 2023: Pneumonia      Comment:  Also  Feb 2024  No date: Psoriasis  06/22/2024: SIRS (systemic inflammatory response syndrome) (HCC) Past Surgical History:  No date: APPENDECTOMY  2009: BREAST CYST EXCISION; Right  10/31/2018: COLONOSCOPY  04/21/2017: DXA PROCEDURE (HISTORICAL)  9/12/2024: IR BIOPSY OTHER  9/12/2024: IR LUMBAR PUNCTURE  No date: MAMMO (HISTORICAL)      Comment:  8-24-18 07/13/2009: MAMMO NEEDLE LOCALIZATION RIGHT (ALL INC); Right  No date: SKIN LESION EXCISION      Comment:  bridge of nose   Current Outpatient Medications   Medication Instructions    acetaminophen (TYLENOL) 325 mg, Every 6 hours PRN    aspirin 81 mg, Oral, Daily    celecoxib (CELEBREX) 200 mg, Oral, 2 times daily    dexamethasone (DECADRON) 1 mg tablet Take 1 tablet (1 mg total) by mouth daily with breakfast for 7 days, THEN 0.5 tablets (0.5 mg total) daily with breakfast for 8 days. Do not start before October 17, 2024.    dexamethasone (DECADRON) 2 mg, Oral, 2  times daily with meals    diphenhydrAMINE (BENADRYL) 25 mg, Every 12 hours PRN    folic acid (FOLVITE) 1 mg, Oral, Daily    gabapentin (NEURONTIN) 300 mg, Oral, Daily at bedtime    HYDROmorphone (DILAUDID) 2 mg, Oral, Every 4 hours PRN, 1 tab po q 4 hrs prn pain/dyspnea    levETIRAcetam (KEPPRA) 750 mg, Oral, 2 times daily    Lidocaine 4 % PTCH Apply topically 4% applies to buttocks as needed every 8 to 12 hours    MELATONIN PO 6 mg, Daily at bedtime    pantoprazole (PROTONIX) 40 mg tablet TAKE 1 TABLET(40 MG) BY MOUTH DAILY EARLY MORNING    Probiotic Product (PROBIOTIC DAILY PO) 1 capsule, Oral, Daily    Psyllium (METAMUCIL PO) Oral, Gummies for constipation    vitamin B-12 (VITAMIN B-12) 1,000 mcg, Oral, Daily    Vitamin D3 2,000 Units, Daily    Allergies   Allergen Reactions    Doxycycline Headache    Keflex [Cephalexin] Rash    Neomycin-Polymyxin-Dexameth Eye Swelling      Social History     Tobacco Use    Smoking status: Former     Current packs/day: 1.00     Average packs/day: 1 pack/day for 58.8 years (58.8 ttl pk-yrs)     Types: Cigarettes     Start date:      Passive exposure: Past    Smokeless tobacco: Never    Tobacco comments:     Quit    Vaping Use    Vaping status: Never Used   Substance Use Topics    Alcohol use: Not Currently    Drug use: Never    Family History   Problem Relation Age of Onset    Stroke Mother     Depression Mother             Hypertension Mother     Hearing loss Mother     Tuberculosis Father     Cancer Brother         lung    Tuberculosis Brother     Coronary artery disease Brother     Hypertension Brother     No Known Problems Daughter     No Known Problems Daughter     No Known Problems Maternal Grandmother     No Known Problems Maternal Grandfather     No Known Problems Paternal Grandmother     No Known Problems Paternal Grandfather     No Known Problems Maternal Aunt     No Known Problems Maternal Aunt     No Known Problems Maternal Aunt     No Known Problems  Maternal Aunt     No Known Problems Paternal Aunt     Cancer Brother         Throat cancer          Objective :                   Vitals I/O      Most Recent Min/Max in 24hrs   Temp 98.4 °F (36.9 °C) Temp  Min: 98.3 °F (36.8 °C)  Max: 98.9 °F (37.2 °C)   Pulse 71 Pulse  Min: 70  Max: 97   Resp 16 Resp  Min: 16  Max: 20   /83 BP  Min: 146/71  Max: 213/90   O2 Sat 96 % SpO2  Min: 94 %  Max: 99 %      Intake/Output Summary (Last 24 hours) at 10/21/2024 0349  Last data filed at 10/21/2024 0340  Gross per 24 hour   Intake 101.25 ml   Output 1350 ml   Net -1248.75 ml       Diet NPO    Invasive Monitoring           Physical Exam   Physical Exam  Eyes:      General: Neglect.         Right eye: No discharge.         Left eye: No discharge.      Comments: Patient is unable to perform eye tracking from midline to the right.  Pupils equal, round and reactive to light bilaterally.   Skin:     General: Skin is warm and dry.      Capillary Refill: Capillary refill takes less than 2 seconds.   HENT:      Head: Normocephalic and atraumatic.      Nose: No rhinorrhea or epistaxis.      Mouth/Throat:      Mouth: Mucous membranes are moist.      Pharynx: No oropharyngeal exudate.   Cardiovascular:      Rate and Rhythm: Normal rate and regular rhythm.      Heart sounds: Normal heart sounds.   Musculoskeletal:      Right lower leg: No edema.      Left lower leg: No edema.   Abdominal:      Palpations: Abdomen is soft.   Constitutional:       General: She is not in acute distress.     Appearance: She is well-developed. She is not toxic-appearing.      Comments: Thin female   Pulmonary:      Effort: Pulmonary effort is normal.      Breath sounds: Normal breath sounds.   Psychiatric:         Speech: Speech is expressive aphasia.   Neurological:      Mental Status: She is alert. She is calm, disoriented to place, disoriented to time and disoriented to situation.      Cranial Nerves: No facial asymmetry.      Comments: Patient unable to  respond to commands for movement of bilateral upper and lower extremities.          Diagnostic Studies        Lab Results: I have reviewed the following results:     Medications:  Scheduled PRN   [Held by provider] celecoxib, 200 mg, BID  chlorhexidine, 15 mL, Q12H KRISS  dexamethasone, 4 mg, Q6H KRISS  enoxaparin, 40 mg, Daily  levETIRAcetam, 750 mg, Q12H KRISS  magnesium sulfate, 2 g, After Breakfast          Continuous    sodium chloride, 75 mL/hr, Last Rate: 75 mL/hr (10/21/24 0039)         Labs:   CBC    Recent Labs     10/20/24  1830 10/21/24  0038   WBC 3.15*  --    HGB 8.9*  --    HCT 27.7*  --    * 170     BMP    Recent Labs     10/20/24  1830   SODIUM 140   K 3.8      CO2 27   AGAP 7   BUN 17   CREATININE 0.93   CALCIUM 9.0       Coags    Recent Labs     10/20/24  1830   INR 0.94   PTT 29        Additional Electrolytes  Recent Labs     10/20/24  1830   MG 1.9          Blood Gas    No recent results  No recent results LFTs  Recent Labs     10/20/24  1830   ALT 12   AST 12*   ALKPHOS 45   ALB 3.8   TBILI 0.25       Infectious  No recent results  Glucose  Recent Labs     10/20/24  1830   GLUC 151*

## 2024-10-21 NOTE — H&P
H&P - Critical Care/ICU   Name: Ayla Nye 73 y.o. female I MRN: [id number]  Unit/Bed#: ICU 10 I Date of Admission: (Not on file)   Date of Service: 10/21/2024 I Hospital Day: 0       Assessment & Plan  Metastatic cancer to brain (HCC)  10/8 MRI-increase size of left temporal metastatic lesion, unchanged left occipital metastatic lesion with evidence of radiation necrosis, left paramedian parietal lobe lesion, left anterior lateral frontal lobe lesion, persistent diffuse vasogenic edema left parietal lobe left occipital lobe with mild mass effect on left lateral ventricle  10/20 CTH-stable imaging compared to previous  Continue Decadron for vasogenic edema  Every 2 hours neurochecks  Palliative care consult  Continue Keppra for seizure prophylaxis  Video EEG monitoring  Maintain seizure precautions  Hypertension    Mixed hyperlipidemia    Malignant neoplasm of upper lobe, right bronchus or lung (HCC)  Follow-up outpatient  Palliative care consult  Palliative care patient  Palliative care consult for goals of care  Acute encephalopathy  Suspect secondary to decreased Decadron versus seizure  Video EEG monitoring  Continue Keppra  Maintain Decadron for vasogenic edema  MRI when able  Hypothyroidism    Disposition: Critical care    History of Present Illness   Ayla Nye is a 73 y.o. with a past medical history of malignant neoplasm of upper lobe with brain metastases. Per family patient has been weaning down on her steroids, there have been multiple events where patient had altered mental status upon decreasing steroid use. Presented to [facility] with altered mental status. Transfer[facility].    History obtained from chart review and unobtainable from patient due to mental status.      Historical Information   Past Medical History:  2022: Allergic      Comment:  Allergic to neomiacin and cephalexin  No date: Cancer (HCC)      Comment:  lung cancer  No date: Cancer  (HCC)      Comment:  mets to brain  No date: Cancer (HCC)      Comment:  perianal mass  Nov. 2023: Cataract      Comment:  Due to have durgery June 2024  No date: Essential thrombocytosis (HCC)      Comment:  controlled with medication  No date: Hyperlipidemia  No date: Hypertension  No date: Mass in chest  No date: Nodular goiter  No date: Osteoporosis  Oct 16, 2023: Pneumonia      Comment:  Also  Feb 2024  No date: Psoriasis  06/22/2024: SIRS (systemic inflammatory response syndrome) (HCC) Past Surgical History:  No date: APPENDECTOMY  2009: BREAST CYST EXCISION; Right  10/31/2018: COLONOSCOPY  04/21/2017: DXA PROCEDURE (HISTORICAL)  9/12/2024: IR BIOPSY OTHER  9/12/2024: IR LUMBAR PUNCTURE  No date: MAMMO (HISTORICAL)      Comment:  8-24-18 07/13/2009: MAMMO NEEDLE LOCALIZATION RIGHT (ALL INC); Right  No date: SKIN LESION EXCISION      Comment:  bridge of nose   Current Outpatient Medications   Medication Instructions    acetaminophen (TYLENOL) 325 mg, Every 6 hours PRN    aspirin 81 mg, Oral, Daily    celecoxib (CELEBREX) 200 mg, Oral, 2 times daily    dexamethasone (DECADRON) 1 mg tablet Take 1 tablet (1 mg total) by mouth daily with breakfast for 7 days, THEN 0.5 tablets (0.5 mg total) daily with breakfast for 8 days. Do not start before October 17, 2024.    dexamethasone (DECADRON) 2 mg, Oral, 2 times daily with meals    diphenhydrAMINE (BENADRYL) 25 mg, Every 12 hours PRN    folic acid (FOLVITE) 1 mg, Oral, Daily    gabapentin (NEURONTIN) 300 mg, Oral, Daily at bedtime    HYDROmorphone (DILAUDID) 2 mg, Oral, Every 4 hours PRN, 1 tab po q 4 hrs prn pain/dyspnea    levETIRAcetam (KEPPRA) 750 mg, Oral, 2 times daily    Lidocaine 4 % PTCH Apply topically 4% applies to buttocks as needed every 8 to 12 hours    MELATONIN PO 6 mg, Daily at bedtime    pantoprazole (PROTONIX) 40 mg tablet TAKE 1 TABLET(40 MG) BY MOUTH DAILY EARLY MORNING    Probiotic Product (PROBIOTIC DAILY PO) 1 capsule, Oral, Daily    Psyllium  (METAMUCIL PO) Oral, Gummies for constipation    vitamin B-12 (VITAMIN B-12) 1,000 mcg, Oral, Daily    Vitamin D3 2,000 Units, Daily    Allergies   Allergen Reactions    Doxycycline Headache    Keflex [Cephalexin] Rash    Neomycin-Polymyxin-Dexameth Eye Swelling      Social History     Tobacco Use    Smoking status: Former     Current packs/day: 1.00     Average packs/day: 1 pack/day for 58.8 years (58.8 ttl pk-yrs)     Types: Cigarettes     Start date:      Passive exposure: Past    Smokeless tobacco: Never    Tobacco comments:     Quit    Vaping Use    Vaping status: Never Used   Substance Use Topics    Alcohol use: Not Currently    Drug use: Never    Family History   Problem Relation Age of Onset    Stroke Mother     Depression Mother             Hypertension Mother     Hearing loss Mother     Tuberculosis Father     Cancer Brother         lung    Tuberculosis Brother     Coronary artery disease Brother     Hypertension Brother     No Known Problems Daughter     No Known Problems Daughter     No Known Problems Maternal Grandmother     No Known Problems Maternal Grandfather     No Known Problems Paternal Grandmother     No Known Problems Paternal Grandfather     No Known Problems Maternal Aunt     No Known Problems Maternal Aunt     No Known Problems Maternal Aunt     No Known Problems Maternal Aunt     No Known Problems Paternal Aunt     Cancer Brother         Throat cancer          Objective :                   Vitals I/O      Most Recent Min/Max in 24hrs   Temp   Temp  Min: 98.3 °F (36.8 °C)  Max: 98.9 °F (37.2 °C)   Pulse   Pulse  Min: 70  Max: 97   Resp   Resp  Min: 16  Max: 20   BP   BP  Min: 146/71  Max: 213/90   O2 Sat   SpO2  Min: 94 %  Max: 99 %    No intake or output data in the 24 hours ending 10/21/24 0332    No diet orders on file    Invasive Monitoring           Physical Exam   Physical Exam  Vitals and nursing note reviewed.   Eyes:      Pupils: Pupils are equal, round, and reactive  to light.   Skin:     General: Skin is warm.      Capillary Refill: Capillary refill takes less than 2 seconds.   HENT:      Head: Normocephalic.      Right Ear: Hearing normal.      Left Ear: Hearing normal.      Mouth/Throat:      Mouth: Mucous membranes are moist.   Cardiovascular:      Rate and Rhythm: Normal rate.      Pulses: Normal pulses.   Musculoskeletal:         General: Normal range of motion.   Abdominal:      Palpations: Abdomen is soft.   Constitutional:       Appearance: She is well-developed.   Pulmonary:      Breath sounds: Normal breath sounds.   Psychiatric:         Speech: Speech is receptive aphasia. Speech is not no expressive aphasia.   Neurological:      Mental Status: She is alert.      Cranial Nerves: No dysarthria or facial asymmetry.      Sensory: No sensory deficit.      Motor: Strength full and intact in all extremities.      Comments: Opens eyes spontaneously  Oriented to self, location, situation  Confused to date  Follows commands all 4 extremities, strength 5 out of 5 throughout  Receptive aphasia          Diagnostic Studies        Lab Results: I have reviewed the following results:     Medications:  Scheduled PRN        Continuous    No current facility-administered medications for this encounter.       Labs:   CBC    Recent Labs     10/20/24  1830 10/21/24  0038   WBC 3.15*  --    HGB 8.9*  --    HCT 27.7*  --    * 170     BMP    Recent Labs     10/20/24  1830   SODIUM 140   K 3.8      CO2 27   AGAP 7   BUN 17   CREATININE 0.93   CALCIUM 9.0       Coags    Recent Labs     10/20/24  1830   INR 0.94   PTT 29        Additional Electrolytes  Recent Labs     10/20/24  1830   MG 1.9          Blood Gas    No recent results  No recent results LFTs  Recent Labs     10/20/24  1830   ALT 12   AST 12*   ALKPHOS 45   ALB 3.8   TBILI 0.25       Infectious  No recent results  Glucose  Recent Labs     10/20/24  1830   GLUC 151*

## 2024-10-21 NOTE — ASSESSMENT & PLAN NOTE
10/8 MRI-increase size of left temporal metastatic lesion, unchanged left occipital metastatic lesion with evidence of radiation necrosis, left paramedian parietal lobe lesion, left anterior lateral frontal lobe lesion, persistent diffuse vasogenic edema left parietal lobe left occipital lobe with mild mass effect on left lateral ventricle  10/20 CTH-stable imaging compared to previous  Continue Decadron for vasogenic edema  Every 2 hours neurochecks  Palliative care consult  Continue Keppra for seizure prophylaxis  Video EEG monitoring  Maintain seizure precautions

## 2024-10-21 NOTE — OCCUPATIONAL THERAPY NOTE
Occupational Therapy Evaluation     Patient Name: Ayla Nye  Today's Date: 10/21/2024  Problem List  Principal Problem:    Metastatic cancer to brain (HCC)  Active Problems:    Hypertension    Mixed hyperlipidemia    Malignant neoplasm of upper lobe, right bronchus or lung (HCC)    Hypothyroidism    Palliative care patient    Acute encephalopathy    Past Medical History  Past Medical History:   Diagnosis Date    Allergic 2022    Allergic to neomiacin and cephalexin    Cancer (HCC)     lung cancer    Cancer (HCC)     mets to brain    Cancer (HCC)     perianal mass    Cataract Nov. 2023    Due to have durgery June 2024    Essential thrombocytosis (HCC)     controlled with medication    Hyperlipidemia     Hypertension     Mass in chest     Nodular goiter     Osteoporosis     Pneumonia Oct 16, 2023    Also  Feb 2024    Psoriasis     SIRS (systemic inflammatory response syndrome) (HCC) 06/22/2024     Past Surgical History  Past Surgical History:   Procedure Laterality Date    APPENDECTOMY      BREAST CYST EXCISION Right 2009    COLONOSCOPY  10/31/2018    DXA PROCEDURE (HISTORICAL)  04/21/2017    IR BIOPSY OTHER  9/12/2024    IR LUMBAR PUNCTURE  9/12/2024    MAMMO (HISTORICAL)      8-24-18    MAMMO NEEDLE LOCALIZATION RIGHT (ALL INC) Right 07/13/2009    SKIN LESION EXCISION      bridge of nose         10/21/24 1355   OT Last Visit   OT Visit Date 10/21/24   Note Type   Note type Evaluation   Pain Assessment   Pain Assessment Tool 0-10   Pain Score No Pain   Restrictions/Precautions   Weight Bearing Precautions Per Order No   Other Precautions Cognitive;Chair Alarm;Bed Alarm;Multiple lines;Fall Risk  (continuous EEG)   Home Living   Type of Home House   Home Layout Two level;Bed/bath upstairs;Stairs to enter with rails  (8 HARRY with HR)   Bathroom Shower/Tub Tub/shower unit   Bathroom Toilet Standard   Bathroom Equipment Grab bars in shower;Grab bars around toilet   Home Equipment   (no DME)   Prior Function    Level of [place] Independent with ADLs;Independent with functional mobility;Needs assistance with IADLS   Lives With (S)  Alone   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   IADLs Independent with meal prep;Independent with medication management;Family/Friend/Other provides transportation   Falls in the last 6 months 0   Vocational Retired   Lifestyle   Autonomy I adls and mobility w/o ad - denies falls - family assists with iadls/transportation   Reciprocal Relationships supportive family who check on pt regularly   Service to Others retired   Intrinsic Gratification sedentary - does not id any interests   Subjective   Subjective offers no c/o   ADL   Eating Assistance 5  Supervision/Setup   Grooming Assistance 4  Minimal Assistance   UB Bathing Assistance 4  Minimal Assistance   LB Bathing Assistance 4  Minimal Assistance   UB Dressing Assistance 4  Minimal Assistance   LB Dressing Assistance 4  Minimal Assistance   Toileting Assistance  4  Minimal Assistance   Bed Mobility   Supine to Sit 4  Minimal assistance   Transfers   Sit to Stand 4  Minimal assistance   Stand to Sit 4  Minimal assistance   Functional Mobility   Functional Mobility 4  Minimal assistance   Additional Comments to take a few steps from bed to chair   Additional items Hand hold assistance   Balance   Static Sitting Fair   Dynamic Sitting Fair -   Static Standing Poor +   Dynamic Standing Poor   Ambulatory Poor   Activity Tolerance   Activity Tolerance Patient limited by fatigue;Treatment limited secondary to medical complications (Comment)   Medical Staff Made Aware PT present for co-eval 2* medical complexity, comorbidities and limited overall tolerance to activities   RUE Assessment   RUE Assessment WFL   LUE Assessment   LUE Assessment WFL   Cognition   Arousal/Participation Arousable;Cooperative   Attention Attends with cues to redirect   Orientation Level Oriented to person;Oriented to place;Oriented to time;Oriented to situation   Memory Decreased  recall of recent events;Decreased recall of precautions   Following Commands Follows one step commands with increased time or repetition   Assessment   Limitation Decreased ADL status;Decreased Safe judgement during ADL;Decreased cognition;Decreased endurance;Decreased self-care trans;Decreased high-level ADLs   Prognosis Good   Assessment Pt is a 73 y.o. female who was admitted to [facility] on 10/21/2024 with Metastatic cancer to brain (HCC) /change of MS, confusion and difficulty speaking. Patient  has a past medical history of Allergic, Cancer (HCC), Cancer (HCC), Cancer (HCC), Cataract, Essential thrombocytosis (HCC), Hyperlipidemia, Hypertension, Mass in chest, Nodular goiter, Osteoporosis, Pneumonia, Psoriasis, and SIRS (systemic inflammatory response syndrome) (HCC).   At baseline pt was completing adls and mobility independently w/o ad - denies falls - family assists with iadls/transportation. Pt lives alone in 2 story home with 2nd floor bed/bath and 8 HARRY - family checks on pt regularly. Currently pt requires min assist for overall ADLS and min assist for functional mobility/transfers. Pt currently presents with impairments in the following categories -steps to enter environment, limited home support, difficulty performing ADLS, difficulty performing IADLS , limited insight into deficits, and environment activity tolerance, endurance, standing balance/tolerance, sitting balance/tolerance, memory, insight, safety , judgement , and attention . These impairments, as well as pt's fatigue, decreased caregiver support, risk for falls, and home environment  limit pt's ability to safely engage in all baseline areas of occupation, includinggrooming, bathing, dressing, toileting, functional mobility/transfers, community mobility, laundry , house maintenance, medication management, meal prep, cleaning, social participation , and leisure activities  From OT standpoint, recommend Level I resources upon D/C.  OT will continue to follow to address the below stated goals.   Goals   Patient Goals go home   Long Term Goal #1 1) Mod I UB/LB adls after setup with use of LHAE PRN  2)  Mod I toileting and clothing management  3) Mod I bed mobility  4) Mod I functional mob/transfers to and from all surfaces with fair+ to good balance/safety   5) Increase activity tolerance to 30-35min for participation in adls and enjoyable activities  6) Assess DME needs   7) Demonstrate good carryover with safe use of AD during functional tasks   8) Assess DME needs   9) Ongoing functional cognitive assessment to assist with safe d/c recommendations   Plan   Treatment Interventions ADL retraining;Functional transfer training;Endurance training;Cognitive reorientation;Patient/family training;Equipment evaluation/education;Compensatory technique education;Activityengagement   Goal Expiration Date 11/04/24   OT Frequency 3-5x/wk   Discharge Recommendation   Rehab Resource Intensity Level, OT I (Maximum Resource Intensity)   AM-PAC Daily Activity Inpatient   Lower Body Dressing 3   Bathing 3   Toileting 3   Upper Body Dressing 3   Grooming 3   Eating 3   Daily Activity Raw Score 18   Daily Activity Standardized Score (Calc for Raw Score >=11) 38.66   AM-PAC Applied Cognition Inpatient   Following a Speech/Presentation 3   Understanding Ordinary Conversation 4   Taking Medications 3   Remembering Where Things Are Placed or Put Away 3   Remembering List of 4-5 Errands 3   Taking Care of Complicated Tasks 3   Applied Cognition Raw Score 19   Applied Cognition Standardized Score 39.77   End of Consult   Education Provided Yes   Patient Position at End of Consult Bedside chair;Bed/Chair alarm activated;All needs within reach   Nurse Communication Nurse aware of consult       The patient's raw score on the AM-PAC Daily Activity Inpatient Short Form is 18. A raw score of less than 19 suggests the patient may benefit from discharge to post-acute  rehabilitation services. Please refer to the recommendation of the Occupational Therapist for safe discharge planning.      Documentation Completed By:    UMBERTO Marroquin/L  [employer] Certified - [id number]

## 2024-10-21 NOTE — CONSULTS
Consultation - Palliative & Supportive Care   Ayla Nye  73 y.o.  female  ICU 10/ICU 10   MRN: [id number]  Encounter: [id number]    Assessment and Plan:  Palliative Care Patient:  Palliative Care Encounter:  Recurrent Metastatic Non-Small Cell Lung Cancer of the Right Upper Lobe:  Supra- and Infratentorial Intracranial Metastasis:  Patient is a 73-year-old female, with presumed recurrent metastatic non-small cell lung cancer of the right upper lobe (Status-Post Definitive Chemoradiation with Carboplatin and Paclitaxel from May 23rd, 2024 through July 5th, 2024; Currently on Carboplatin, Pemetrexed, and Pembrolizumab - Cycle 1 was administered on October 7th, 2024), complicated by supra- and infratentorial intracranial metastases (Status-Post SRS on August 8th, 2024), as well as possible metastatic-deposit to the pelvis (Initiated Palliative Radiation to the Right Gluteal Region - Status-post 14 of 15 planned fractions); who presented as a transfer to [facility] from [facility] on October 20th, 2024, for acute encephalopathy. Of note, patient has had multiple hospitalizations for similar presentation in the setting of corticosteroid-taper. Discussed case with Radiation Oncology. Pending repeat MRI Brain, will plan to continue Dexamethasone 1-2 mg Daily on discharge, without eventual taper in order to avoid hospital re-admission. In the interim, discussed goals of care, at length, with patient. She remains focused on cancer-directed therapy, and anticipates resumption of the above-mentioned on hospital discharge. Will recommend outpatient follow-up, as scheduled. Modified patient's pain-regimen, while inpatient. Of note, she is declining re-initiation of a narcotic-based pain-regimen, and is opting against resuming Gabapentin, as well. Will trial Acetaminophen (Scheduled), with Ketorolac, as needed. Discussed the above-mentioned, at length, with patient; and she  expressed understanding and agreement.    Summary of Recommendations:  Repeat MRI Brain, ordered and pending.  Discussed case with Radiation Oncology - Agree with continuation of Dexamethasone 1-2 mg Daily, on discharge.  Modified pain-regimen, as follows:  Ordered Acetaminophen 975 mg TID (Scheduled).  Continue Ketorolac 15 mg Q6HR PRN (Severe Pain).  Agree with Pantoprazole-prophylaxis, given ongoing Corticosteroid-therapy.  Note: Patient clearly declined re-initiation of a narcotic-based pain-regimen.  Recommend outpatient follow-up with Medical Oncology, Radiation Oncology, and Palliative Care, as scheduled:  Note: Patient remains treatment-focused, and plans to resume cancer-directed therapy, on discharge.    IDENTIFICATION:  Inpatient consult to Palliative Care  Consult performed by: Salome Underwood DO  Consult ordered by: ZULEYMA Torres    Reason for Consult: Goals of Care.  History of Present Illness:  Ayla Nye is a 73-year-old female, with an established history of hypertension, hyperlipidemia, essential thrombocytosis, KRISTI-2 mutation positive (Previously treated with Hydroxyurea 500 mg Daily), psoriatic-arthritis, presumed recurrent metastatic non-small cell lung cancer of the right upper lobe (Status-Post Definitive Chemoradiation with Carboplatin and Paclitaxel from May 23rd, 2024 through July 5th, 2024; Currently on Carboplatin, Pemetrexed, and Pembrolizumab - Cycle 1 was administered on October 7th, 2024), complicated by supra- and infratentorial intracranial metastases (Status-Post SRS on August 8th, 2024), as well as possible metastatic-deposit to the pelvis (Initiated Palliative Radiation to the Right Gluteal Region - Status-post 14 of 15 planned fractions), and former tobacco-use (Approximately 60-pack/years - Quit in 2010), who presented as a transfer to [facility] from [facility] on October 20th, 2024, for acute encephalopathy.  Historical information was obtained predominately from prior documentation.    Of particular importance, patient has a particularly complex Oncologic history, beginning with diagnosis with locally-advanced, unresectable (Stage IIIB - cT2b, cN3, cM0) non-small cell lung cancer of the right upper lobe, in March 2024. Patient initially presented to the attention of Pulmonology on March 7th, 2024, for further evaluation of abnormal imaging findings. CT Chest showed a right upper lobe mass (Measuring 4.1 x 3.4-centimeters), with accompanying enlarged right lower paratracheal lymph nodes. A spiculated nodule adjacent to the right upper lobe mass was also reported (Measuring 1.5-centimeters). Given the above-mentioned, bronchoscopy with EBUS was performed on March 26th, 2024, and preliminary pathology demonstrated involvement of a level 4R lymph node with carcinoma. Final pathology demonstrated non-small cell carcinoma, most compatible with lung primary (Note: Adenocarcinoma was favored). Next-generation sequencing was significant, as follows: PD-L1 TPS: 5%, high Tumor Mutational [place], and pathogenic variant in KRAS G12V, and TP53. Subsequent PET-CT demonstrated the intensely FDG-avid right upper lobe mass, as well as mediastinal and right perihilar lymphadenopathy. No distant metastatic disease in the neck, abdomen, or pelvis noted. MRI Brain was also obtained to complete staging, and was unremarkable for intracranial metastatic disease.    Patient's case was presented at the Multidisciplinary Thoracic Case Conference. Given locally-advanced, unresectable disease, patient was recommended definitive chemoradiation. She was initiated on weekly Carboplatin and Paclitaxel with radiation on May 23rd, 2024. Patient completed six-cycles of the above-mentioned, on July 2nd, 2024 (Radiation was completed on July 5th, 2024). Unfortunately, repeat imaging, including MRI Brain, demonstrated multiple supratentorial and  infratentorial enhancing lesions with associated vasogenic edema (Largest in the left occipital lobe). PET-CT also showed a new hypermetabolic soft tissue mass involving the right anorectal fat, medial to the right ischial tuberosity. Persistent right upper lobe bronchogenic carcinoma, with mediastinal and right hilar yaw metastatic disease was also noted; although improved since prior PET-CT. For intracranial metastatic disease, patient completed SRS on August 8th, 2024. It appears that patient has had multiple subsequent hospitalizations for neurologic symptoms related to down-titration of corticosteroids.    Of note, MRI Pelvis was obtained to evaluate the pelvic mass identified on PET-CT, and demonstrated an enhancing soft tissue lesion in the right ischioanal fat (Measuring 3.8 x 2.6 x 3.4-centimeters). Image-guided biopsy of the mass was performed on September 12th, 2024. Pathology demonstrated squamous cell carcinoma. Medical Oncology and Radiation Oncology documentation, suggested that the above-mentioned was possibly representative of a primary anal/perianal squamous cell carcinoma versus metastatic combined lung tumor (Adenocarcinoma and previously unsampled squamous component). In either case, patient was offered palliative radiation to the right gluteal region, in the setting of excruciating pain refractory to medication. She has since completed 14 of 15-planned fractions. In the interim, for presumed recurrent metastatic non-small cell lung cancer of the right upper lobe, patient was recommended systemic-chemoimmunotherapy with Carboplatin, Pemetrexed, and Pembrolizumab. Patient was initiated on Cycle 1 of chemoimmunotherapy on October 7th, 2024. She is anticipating initiation of Cycle 2 on October 28th, 2024.    Patient returns to the Emergency Department with recurrent acute encephalopathy in the setting of corticosteroid-taper. Upon arrival to [facility], CT Head  was obtained, and re-demonstrated stable vasogenic edema in the left parietal-temporal region, in the setting of known intracranial metastatic disease. CTA Stroke Alert was performed and was unremarkable for hemodynamically significant stenosis, dissection, or occlusion of the carotid or vertebral arteries or major vessels of the [place]. Patient was transferred to the [facility] Neurologic Intensive Care Unit for urgent MRI Brain. Neurosurgery was consulted. No urgent indication for neurosurgical-intervention noted at this time. Palliative Care was consulted for assistance with discussion of goals of care.    Interval History: Regarding her social history, patient resides independently in [place], [place]. She is capable of performing activities of daily independently, and without assistance. She ambulates without an assistive-device. Patient has two daughters (Juliana Quispe and Elva Cardoza); whom live locally in [place], [place]. Patient's daughters serve as her medical power of , as outlined in her Advanced Directed. Patient was also formerly , and her ex- participates in her care, as well (e.g. Transporting her to and from appointments). We discussed goals of care at great length. Patient remains treatment-focused, and would like to resume cancer-directed cares, including chemoimmunotherapy, on hospital discharge.      ECOG Performance Status: 2-Points.    Past Medical History:   Diagnosis Date    Allergic 2022    Allergic to neomiacin and cephalexin    Cancer (HCC)     lung cancer    Cancer (HCC)     mets to brain    Cancer (HCC)     perianal mass    Cataract Nov. 2023    Due to have durgery June 2024    Essential thrombocytosis (HCC)     controlled with medication    Hyperlipidemia     Hypertension     Mass in chest     Nodular goiter     Osteoporosis     Pneumonia Oct 16, 2023    Also  Feb 2024    Psoriasis     SIRS  (systemic inflammatory response syndrome) (HCC) 06/22/2024     Past Surgical History:   Procedure Laterality Date    APPENDECTOMY      BREAST CYST EXCISION Right 2009    COLONOSCOPY  10/31/2018    DXA PROCEDURE (HISTORICAL)  04/21/2017    IR BIOPSY OTHER  9/12/2024    IR LUMBAR PUNCTURE  9/12/2024    MAMMO (HISTORICAL)      8-24-18    MAMMO NEEDLE LOCALIZATION RIGHT (ALL INC) Right 07/13/2009    SKIN LESION EXCISION      bridge of nose     Social History     Socioeconomic History    Marital status:      Spouse name: Not on file    Number of children: Not on file    Years of education: Not on file    Highest education level: Not on file   Occupational History    Not on file   Tobacco Use    Smoking status: Former     Current packs/day: 1.00     Average packs/day: 1 pack/day for 58.8 years (58.8 ttl pk-yrs)     Types: Cigarettes     Start date: 1966     Passive exposure: Past    Smokeless tobacco: Never    Tobacco comments:     Quit 2010   Vaping Use    Vaping status: Never Used   Substance and Sexual Activity    Alcohol use: Not Currently    Drug use: Never    Sexual activity: Not Currently     Partners: Male     Birth control/protection: Post-menopausal   Other Topics Concern    Not on file   Social History Narrative    Not on file     Social Determinants of Health     Financial Resource Strain: Low Risk  (8/14/2023)    Overall Financial Resource Strain (CARDIA)     Difficulty of Paying Living Expenses: Not hard at all   Food Insecurity: No Food Insecurity (10/21/2024)    Hunger Vital Sign     Worried About Running Out of Food in the Last Year: Never true     Ran Out of Food in the Last Year: Never true   Transportation Needs: No Transportation Needs (10/21/2024)    PRAPARE - Transportation     Lack of Transportation (Medical): No     Lack of Transportation (Non-Medical): No   Physical Activity: Not on file   Stress: Not on file   Social Connections: Not on file   Intimate Partner Violence: Not on file    Housing Stability: Low Risk  (10/21/2024)    Housing Stability Vital Sign     Unable to Pay for Housing in the Last Year: No     Number of Times Moved in the Last Year: 0     Homeless in the Last Year: No     Family History   Problem Relation Age of Onset    Stroke Mother     Depression Mother             Hypertension Mother     Hearing loss Mother     Tuberculosis Father     Cancer Brother         lung    Tuberculosis Brother     Coronary artery disease Brother     Hypertension Brother     No Known Problems Daughter     No Known Problems Daughter     No Known Problems Maternal Grandmother     No Known Problems Maternal Grandfather     No Known Problems Paternal Grandmother     No Known Problems Paternal Grandfather     No Known Problems Maternal Aunt     No Known Problems Maternal Aunt     No Known Problems Maternal Aunt     No Known Problems Maternal Aunt     No Known Problems Paternal Aunt     Cancer Brother         Throat cancer       MEDICATIONS / ALLERGIES:  all current active meds have been reviewed    Allergies   Allergen Reactions    Doxycycline Headache    Keflex [Cephalexin] Rash    Neomycin-Polymyxin-Dexameth Eye Swelling       OBJECTIVE:  /87   Pulse 66   Temp 97.9 °F (36.6 °C)   Resp 17   SpO2 98%   Physical Exam  Physical Exam:  General: Alert, and oriented; Pleasant, and conversational; Overall, Well-Appearing  HEENT: Atraumatic, and normocephalic; PERRLA; EOMI; Moist mucosal membranes  Respiratory: Normal work of breathing; No supplemental-oxygen requirement  Extremities: No obvious deformities; No peripheral edema; Moves all  Neurologic: Appropriately alert, and oriented to Person, Place, and Time; EEG-leads in place    Lab Results: I have personally reviewed pertinent labs.    HEMATOLOGY PROFILE:  Results from last 7 days   Lab Units 10/21/24  0448 10/21/24  0038 10/20/24  1830 10/16/24  1929   WBC Thousand/uL 3.04*  --  3.15* 2.32*   HEMOGLOBIN g/dL 9.5*  --  8.9* 8.6*    HEMATOCRIT % 29.0*  --  27.7* 26.7*   PLATELETS Thousands/uL 178 170 148* 128*   SEGS PCT % 92*  --   --  68   MONO PCT % 3*  --   --  20*   EOS PCT % 0  --   --  1       CHEMISTRY PROFILE:  Results from last 7 days   Lab Units 10/21/24  0448 10/20/24  1830 10/16/24  1929   SODIUM mmol/L 140 140 139   POTASSIUM mmol/L 4.5 3.8 4.1   CHLORIDE mmol/L 102 106 108   CO2 mmol/L 28 27 25   BUN mg/dL 12 17 18   CREATININE mg/dL 0.81 0.93 0.80   ALBUMIN g/dL 3.9 3.8  --    CALCIUM mg/dL 9.4 9.0 8.7   ALK PHOS U/L 47 45  --    ALT U/L 12 12  --    AST U/L 12* 12*  --        Albumin:  0   Lab Value Date/Time    ALB 3.9 10/21/2024 0448     Imaging Studies: I have personally reviewed pertinent reports.  Procedure: MRI Brain BT w wo Contrast    Result Date: 10/9/2024  Narrative: MRI BRAIN WITH AND WITHOUT CONTRAST INDICATION: C79.31: Secondary malignant neoplasm of brain C34.91: Malignant neoplasm of unspecified part of right bronchus or lung. post SRS, brain metastasis COMPARISON: MRI brain BT with and without contrast 9/10/2024. CTA head and neck with and without contrast 9/9/2024. CT head without contrast 8/21/2024 MRI brain with and without contrast 7/30/2024, 3/28/2024. TECHNIQUE: Multiplanar, multisequence imaging of the brain and sella was performed before and after gadolinium administration. IV Contrast:  10 mL of Gadobutrol injection (SINGLE-DOSE) IMAGE QUALITY:   Diagnostic. FINDINGS: BRAIN PARENCHYMA: Multiple enhancing metastatic lesions as detailed below with comparison made to MRI brain with and without contrast 9/10/2024: - Tiny left anterolateral frontal lobe lesion (13:112), unchanged. - 0.6 cm left paramedian parietal lobe lesion (13:98), unchanged. - 0.3 cm left paramedian frontal lobe lesion (13:90), previously 0.4 cm. - 2.2 cm left occipital lobe lesion with central necrosis (13:75), likely due to treatment related changes of radiation necrosis, unchanged. - 0.4 cm left temporal lobe lesion (13:51),  previously 0.3 cm. - Tiny cerebellar vermis lesion (13:41), previously 0.4 cm. Unchanged enhancement in bilateral cranial nerve VII and both internal auditory canals, suspicious for leptomeningeal metastasis. No new enhancing lesion. Persistent diffuse perilesional vasogenic edema in left parietal lobe and left occipital lobe with mild mass effect on posterior aspect of left lateral ventricle. No midline shift. No acute intracranial hemorrhage. No diffusion weighted signal abnormality to suggest acute infarction. No abnormal hypoperfusion. Small scattered hyperintensities on T2/FLAIR imaging are noted in the periventricular and subcortical white matter demonstrating an appearance that is statistically most likely to represent mild microangiopathic change. VENTRICLES: Mild mass effect on posterior aspect of left lateral ventricle. No obstructive hydrocephalus. No acute intraventricular hemorrhage. SELLA AND PITUITARY GLAND:  Normal. ORBITS:  Normal. PARANASAL SINUSES:  Normal. VASCULATURE:  Evaluation of the major intracranial vasculature demonstrates appropriate flow voids. CALVARIUM AND SKULL BASE:  Normal. EXTRACRANIAL SOFT TISSUES:  Normal.     Impression: Mixed treatment response since MRI brain 9/10/2024 - Slightly increase size of left temporal lobe metastatic lesion. - Unchanged left occipital lobe metastatic lesion with evidence of radiation necrosis, left paramedian parietal lobe lesion, and tiny left anterolateral lobe frontal lobe lesion. - Unchanged enhancement in bilateral cranial nerve VII and both internal auditory canals, suspicious for leptomeningeal metastasis. - Decreased size of left paramedian frontal lobe lesion and tiny cerebellar vermis lesion. - No new enhancing intracranial metastasis. Persistent diffuse perilesional vasogenic edema in left parietal lobe and left occipital lobe with mild mass effect on posterior aspect of left lateral ventricle. The study was marked in EPIC for significant  "notification. Workstation performed: [id number]    EKG, Pathology, and Other Studies: I have personally reviewed pertinent reports.    Counseling / Coordination of Care:  60-minutes total time spent on 10/21/24 in caring for this patient.    Salome Underwood DO  [facility] Palliative and Supportive Care  [phone]    Portions of this document may have been created using dictation software and as such some \"sound alike\" terms may have been generated by the system. Do not hesitate to contact me with any questions or clarifications.  "

## 2024-10-21 NOTE — PROGRESS NOTES
Progress Note - Critical Care/ICU   Name: Ayla Nye 73 y.o. female I MRN: [id number]  Unit/Bed#: ICU 14 I Date of Admission: 10/20/2024   Date of Service: 10/21/2024 I Hospital Day: 1       Critical Care Interval Transfer Note:    Brief Hospital Summary: Ayla Nye is a 73 y.o. PMH lung cancer with brain metastases who presents to the ICU due to worsening mental status, bilateral neglect and new Broca's aphasia. The patient originally presented to the ED with due to confusion. Neurology saw her at 1830 30 min after arrival and felt that her presentation could be due to post-ictal state and recommended 24 hour video EEG. On my examination at 2300, patient exhibited severe word finding aphasia, neglect of bilateral extremities and inability to perform eye tracking.  Patient's daughter informed us that she has had periods of worsening mental status when her home dose of Decadron is decreased.  Patient was given 10 mg of Decadron.  Normal saline drip with a goal of 145 to address cerebral edema.  Neurology was concerned of new onset seizures.  Loading dose of Keppra 2000 mg was given in the emergency department with continuation of home dose Keppra 750 mg twice daily.  Neuro CC recommended the patient be on continuous EEG as well as a stat MRI.  Due to [facility] MRI being broken, patient was transferred to [facility] for stat MRI.  Called B neurocritical care AP for extensive signout.    Barriers to discharge:   MRI brain w/wo contrast  Rad/Onc consultation     Consults: IP CONSULT TO NEUROLOGY  IP CONSULT TO CASE MANAGEMENT    Recommended to review admission imaging for incidental findings and document in discharge navigator: Chart reviewed, no known incidental findings noted at this time.      Discharge Plan: Anticipate discharge in 24-48 hrs to home.  [place] Plan: Continue for accurate I/O monitoring for 48 hours       Patient seen and evaluated by Critical Care today and deemed to be appropriate  for transfer to Critical care. Spoke to Dr. Partida from [facility] to accept transfer. Critical care can be contacted via SecureChat with any questions or concerns.

## 2024-10-21 NOTE — PLAN OF CARE
Problem: PHYSICAL THERAPY ADULT  Goal: Performs mobility at highest level of function for planned discharge setting.  See evaluation for individualized goals.  Description: Treatment/Interventions: ADL retraining, Functional transfer training, Endurance training, Bed mobility, Gait training, Spoke to nursing, OT          See flowsheet documentation for full assessment, interventions and recommendations.  Note: Prognosis: Good  Problem List: Decreased strength, Decreased endurance, Impaired balance, Decreased mobility, Decreased coordination, Decreased cognition  Assessment: Pt is a 73 y.o. female who presented to [facility] with altered mental status after decreasing steroid use for management of brain metastases. Family reports there have been multiple instances of this when attempting to wean off steroids. Pt was transferred from [facility] to [facility] Pt  has a past medical history of Allergic, Cancer (HCC), Cancer (HCC), Cancer (HCC), Cataract, Essential thrombocytosis (HCC), Hyperlipidemia, Hypertension, Mass in chest, Nodular goiter, Osteoporosis, Pneumonia, Psoriasis, and SIRS (systemic inflammatory response syndrome) (HCC).. Pt is alert and oriented, able to answer questions about home setup and prior level of function. Prior to admission, pt reports being independent with ADLs and needing assistance with IADLs. Currently pt is presenting as Min assist for bed mobility, transfers, and gait. Pt would benefit from continued therapy to address deficits in strength, endurance, and balance as well as trialing extended ambulation and stair negotiation. At this time PT is recommending level 1 resources upon discharge.        Rehab Resource Intensity Level, PT: I (Maximum Resource Intensity)    See flowsheet documentation for full assessment.

## 2024-10-21 NOTE — ASSESSMENT & PLAN NOTE
Patient with a history of non-small cell lung cancer of the right upper lobe with metastasis to the brain diagnosed in March 2024.  She was on weekly carboplatin and paclitaxel with radiation from 5/23/2024 - 7/5/2024.  Further imaging after treatment showed signs concerning of recurrent metastatic NSCLC now with possible metastasis to the pelvis.  For this, it was recommended that she undergo systemic chemoimmunotherapy with carboplatin, pemetrexed, and pembrolizumab.  Her first cycle was on 10/7/2024.    Plan:  - Patient following closely with oncology in the outpatient setting  - Remainder of care for active concerns as mentioned above

## 2024-10-21 NOTE — PROGRESS NOTES
INTERNAL MEDICINE RESIDENCY TRANSFER ACCEPTANCE NOTE     Name: Ayla Nye   Age & Sex: 73 y.o. female   MRN: [id number]  Unit/Bed#: ICU 10   Encounter: [id number]  Hospital Stay Days: 0    Accepting team: SOD Team C   Code Status: Level 1 - Full Code  Disposition: Patient requires Med/Surg    ASSESSMENT/PLAN     Principal Problem:    Metastatic cancer to brain (HCC)  Active Problems:    Malignant neoplasm of upper lobe, right bronchus or lung (HCC)    Hypertension    Mixed hyperlipidemia    Hypothyroidism    Palliative care patient    Acute encephalopathy      * Metastatic cancer to brain (HCC)  Assessment & Plan  Patient with history of non-small cell lung adenocarcinoma diagnosed in March 2024.  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  She completed SRS for these metastatic lesions in August 2024.  She is on Decadron in the outpatient setting and has had multiple admissions due to episodes of altered mental status with increased vasogenic edema after attempting to taper Decadron dosing.  She presented to [facility] on 10/20 with worsening confusion and a stroke alert was called.  Imaging showed no acute findings.  She was loaded with Keppra, started on Decadron, and transferred to [place] for video EEG and MRI brain under neurocritical care for close monitoring.  The morning of 10/21 patient's mental status reportedly much improved and she was deemed stable for transfer out of the ICU.    Of note, patient is not exactly sure what her current Decadron dosing is.  With that said, it appears that she had been on a Decadron taper throughout September starting at 8 mg daily that was reduced each week.  Her most recent orders show Decadron 1 mg daily from 10/17-10/24 and then 0.5 mg daily for 8 more days    Plan:  - Symptoms likely in the setting of Decadron taper  - Neurosurgery, neurology, and palliative care consulted.  Appreciate assistance  -  Monitor on video EEG  - Continue Decadron 6 mg every 6 hours  - Continue Keppra 1000 mg every 12 hours  - Follow-up MRI brain  - SBP goal <160  - Stat CT head with any neurological decline for return of altered mental status  - Neurochecks every 4 hours  - Seizure precautions in place    Malignant neoplasm of upper lobe, right bronchus or lung (HCC)  Assessment & Plan  Patient with a history of non-small cell lung cancer of the right upper lobe with metastasis to the brain diagnosed in March 2024.  She was on weekly carboplatin and paclitaxel with radiation from 5/23/2024 - 7/5/2024.  Further imaging after treatment showed signs concerning of recurrent metastatic NSCLC now with possible metastasis to the pelvis.  For this, it was recommended that she undergo systemic chemoimmunotherapy with carboplatin, pemetrexed, and pembrolizumab.  Her first cycle was on 10/7/2024.    Plan:  - Patient following closely with oncology in the outpatient setting  - Remainder of care for active concerns as mentioned above    Hypothyroidism  Assessment & Plan  Continue PTA levothyroxine 50 mcg daily    Mixed hyperlipidemia  Assessment & Plan  Most recent  with ASCVD of 18.4%.  Not on any statin therapy per chart review    Plan:  - Outpatient follow-up    Hypertension  Assessment & Plan  Documented history of hypertension but not on any antihypertensives at home.  Blood pressure is acceptable this admission        VTE Pharmacologic Prophylaxis: Reason for no pharmacologic prophylaxis altered mental status stroke rule out  VTE Mechanical Prophylaxis: sequential compression device    HOSPITAL COURSE     Patient is a 73-year-old female with a past medical history of recently diagnosed non-small cell lung cancer with mets to the brain, Anand-2 positive essential thrombocytosis, hypertension, hyperlipidemia, and hypothyroidism who presented to [facility] on 10/20 due to altered mental status.  Of note, patient has had multiple  admissions over the last few months due to altered mental status with increased cerebral vasogenic edema after her Decadron dose gets tapered.  Yesterday, patient progressively became more confused throughout the day prompting evaluation.  Collateral was called and she underwent CT head and CTA H/N.  CT head showed stable vasogenic edema in the left parietal temporal region.  CTA H/N showed no acute findings.  Patient was given multiple doses of IV Decadron and loaded with Keppra.  She was then transferred to [facility] for video EEG and MRI brain under neurocritical care service.  Patient's altered mental status resolved overnight and he was deemed stable for transfer out of the ICU.      SUBJECTIVE     Patient seen and examined at the bedside.  She is A&O x3 and is aware of why she is in the hospital.  She had a mild headache earlier in the morning but otherwise feels close to being back at her baseline.  She is unsure of her exact Decadron dose at this time but does not believe that it was decreased in the last week.  ROS negative.  Neurological exam intact    OBJECTIVE     Vitals:    10/21/24 1000 10/21/24 1100 10/21/24 1200 10/21/24 1300   BP: 156/87 155/83 143/84    BP Location:   Right arm    Pulse: 68 68 68 76   Resp: 20 17 19 19   Temp:   97.5 °F (36.4 °C)    TempSrc:   Oral    SpO2: 98% 97% 97% 98%     I/O last 24 hours:  In: 638.8 [I.V.:588.8; IV Piggyback:50]  Out: 1365 [Urine:1365]    Physical Exam  Constitutional:       General: She is not in acute distress.     Appearance: Normal appearance.   HENT:      Head: Normocephalic and atraumatic.      Mouth/Throat:      Mouth: Mucous membranes are moist.      Pharynx: Oropharynx is clear.   Eyes:      General:         Right eye: No discharge.         Left eye: No discharge.      Conjunctiva/sclera: Conjunctivae normal.   Cardiovascular:      Rate and Rhythm: Normal rate and regular rhythm.      Pulses: Normal pulses.      Heart sounds: Normal heart sounds. No  murmur heard.  Pulmonary:      Effort: Pulmonary effort is normal. No respiratory distress.      Breath sounds: Normal breath sounds. No wheezing, rhonchi or rales.   Abdominal:      General: Bowel sounds are normal. There is no distension.      Tenderness: There is no abdominal tenderness. There is no guarding.   Musculoskeletal:         General: No swelling.   Skin:     General: Skin is warm.      Capillary Refill: Capillary refill takes less than 2 seconds.   Neurological:      General: No focal deficit present.      Mental Status: She is alert and oriented to person, place, and time.      Cranial Nerves: No cranial nerve deficit.      Motor: No weakness.       LABORATORY DATA     Labs: I have personally reviewed pertinent reports.    Results from last 7 days   Lab Units 10/21/24  0448 10/21/24  0038 10/20/24  1830 10/16/24  1929   WBC Thousand/uL 3.04*  --  3.15* 2.32*   HEMOGLOBIN g/dL 9.5*  --  8.9* 8.6*   HEMATOCRIT % 29.0*  --  27.7* 26.7*   PLATELETS Thousands/uL 178 170 148* 128*   SEGS PCT % 92*  --   --  68   MONO PCT % 3*  --   --  20*   EOS PCT % 0  --   --  1      Results from last 7 days   Lab Units 10/21/24  0448 10/20/24  1830 10/16/24  1929   POTASSIUM mmol/L 4.5 3.8 4.1   CHLORIDE mmol/L 102 106 108   CO2 mmol/L 28 27 25   BUN mg/dL 12 17 18   CREATININE mg/dL 0.81 0.93 0.80   CALCIUM mg/dL 9.4 9.0 8.7   ALK PHOS U/L 47 45  --    ALT U/L 12 12  --    AST U/L 12* 12*  --      Results from last 7 days   Lab Units 10/21/24  0448 10/20/24  1830   MAGNESIUM mg/dL 2.7 1.9     Results from last 7 days   Lab Units 10/21/24  0448   PHOSPHORUS mg/dL 3.9      Results from last 7 days   Lab Units 10/20/24  1830 10/16/24  1929   INR  0.94 0.97   PTT seconds 29 29             Micro:  Lab Results   Component Value Date    BLOODCX Received in Microbiology Lab. Culture in Progress. 10/21/2024    BLOODCX Received in Microbiology Lab. Culture in Progress. 10/21/2024    BLOODCX Received in Microbiology Lab. Culture  "in Progress. 10/20/2024    WOUNDCULT 4+ Growth of Providencia rettgeri (A) 08/18/2023    WOUNDCULT 3+ Growth of 08/18/2023     IMAGING & DIAGNOSTIC TESTING     Imaging: I have personally reviewed pertinent reports.    No results found.  EKG, Pathology, and Other Studies: I have personally reviewed pertinent reports.     ALLERGIES     Allergies   Allergen Reactions    Doxycycline Headache    Keflex [Cephalexin] Rash    Neomycin-Polymyxin-Dexameth Eye Swelling     MEDICATIONS     Current Facility-Administered Medications   Medication Dose Route Frequency Provider Last Rate    acetaminophen  975 mg Oral Q8H PRN ZULEYMA Torres      chlorhexidine  15 mL Mouth/Throat Q12H [facility] ZULEYMA Torres      dexamethasone  6 mg Intravenous Q6H [facility] ZULEYMA Torres      levETIRAcetam  1,000 mg Intravenous Q12H [facility] ZULEYMA Torres      levothyroxine  50 mcg Oral Early Morning Holly Redmond MD      ondansetron  4 mg Intravenous Q4H PRN ZULEYMA Torres      pantoprazole  40 mg Oral Early Morning Holly Redmond MD      senna-docusate sodium  2 tablet Oral BID ZULEYMA Torres       Facility-Administered Medications Ordered in Other Encounters   Medication Dose Route Frequency Provider Last Rate    fentaNYL   Intravenous PRN Greg Gamble CRNA      midazolam   Intravenous PRN Greg Gamble CRNA          acetaminophen, 975 mg, Q8H PRN  ondansetron, 4 mg, Q4H PRN        Portions of the record may have been created with voice recognition software.  Occasional wrong word or \"sound a like\" substitutions may have occurred due to the inherent limitations of voice recognition software.  Read the chart carefully and recognize, using context, where substitutions have occurred.    ==  Melonie Siddiqui,   [facility]  Internal Medicine Residency PGY-2   "

## 2024-10-21 NOTE — ED PROVIDER NOTES
Time reflects when diagnosis was documented in both MDM as applicable and the Disposition within this note       Time User Action Codes Description Comment    10/20/2024  6:24 PM Verito Rust Add [R41.82] Altered mental status     10/20/2024 10:50 PM Hugh Leggett Add [G93.6] Vasogenic cerebral edema (HCC)     10/20/2024 10:51 PM Hugh Leggett Add [D53.9] Macrocytic anemia           ED Disposition       ED Disposition   Admit    Condition   Stable    Date/Time   Mon Oct 21, 2024 12:02 AM    Comment   Discussed case with critical care. Patient will be admitted to step down level 1 under Dr Nichole             Assessment & Plan       Medical Decision Making  73-year-old female presents with confusion upon waking up with unknown last known normal.  Stroke alert called.  Initial NIH as above.  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  CT imaging notable for vasogenic edema at baseline without progression.  Discussed case with cardiology and not a candidate for TNK at this time.  Advised that symptoms likely secondary to encephalopathy.  Extensive labs with no significant changes and macrocytic anemia.  While in the ED patient had worsening of symptoms, more confused, more aphasic, more pronounced right upper extremity neglect.  Discussed case with neurology again and advised Keppra for possible seizure-like activity.  Transfer order placed into PACS to discussed case with neurocritical care regarding possible transfer, steroids, Keppra, EEG, further recommendations who stated to start dexamethasone at 10 mg initial dose and then 4 mg every 6 hours, Keppra 2 g loading dose, EEG.  They also advised that patient be better served here at [facility].  Discussed case with critical care team and patient admitted under Dr. Abdiaziz Nichole    Amount and/or Complexity of Data Reviewed  Labs: ordered.  Radiology: ordered.    Risk  OTC drugs.  Prescription drug management.  Decision regarding  hospitalization.        ED Course as of 10/21/24 0045   Sun Oct 20, 2024   2123 Per Neuro, Dr. Luong regarding keppra, can give an additional 1.5g IV then increase to 1g BID maintenance if there is concern for seizure activity   Mon Oct 21, 2024   0040 EKG normal sinus rhythm rate of 92, normal SD interval, normal QRS interval, QTc 442, normal axis, no ST elevations, no ST depressions, no ischemic changes or STEMI.       Medications   dexamethasone (DECADRON) injection 4 mg (4 mg Intravenous Given 10/20/24 2326)   levETIRAcetam (KEPPRA) injection 750 mg (has no administration in time range)   chlorhexidine (PERIDEX) 0.12 % oral rinse 15 mL (has no administration in time range)   enoxaparin (LOVENOX) subcutaneous injection 40 mg (has no administration in time range)   sodium chloride 0.9 % infusion (has no administration in time range)   magnesium sulfate 2 g/50 mL IVPB (premix) 2 g (has no administration in time range)   iohexol (OMNIPAQUE) 350 MG/ML injection (MULTI-DOSE) 70 mL (70 mL Intravenous Given 10/20/24 1839)   acetaminophen (TYLENOL) tablet 975 mg (975 mg Oral Given 10/20/24 2020)   levETIRAcetam (KEPPRA) injection 2,000 mg (2,000 mg Intravenous Given 10/20/24 2144)   dexamethasone (PF) (DECADRON) injection 6 mg (6 mg Intravenous Given 10/20/24 2142)       ED Risk Strat Scores   HEART Risk Score      Flowsheet Row Most Recent Value   Heart Score Risk Calculator    History 0 Filed at: 10/21/2024 0041   ECG 0 Filed at: 10/21/2024 0041   Age 2 Filed at: 10/21/2024 0041   Risk Factors 2 Filed at: 10/21/2024 0041   Troponin 0 Filed at: 10/21/2024 0041   HEART Score 4 Filed at: 10/21/2024 0041           Stroke Assessment       Row Name 10/20/24 4113             NIH Stroke Scale    Interval Baseline      Level of Consciousness (1a.) 0      LOC Questions (1b.) 0      LOC Commands (1c.) 1      Best Gaze (2.) 0      Visual (3.) 1  Right peripheral hemianopia      Facial Palsy (4.) 0      Motor Arm, Left (5a.) 0       Motor Arm, Right (5b.) 0      Motor Leg, Left (6a.) 0      Motor Leg, Right (6b.) 0      Limb Ataxia (7.) 0      Sensory (8.) 0      Best Language (9.) 0      Dysarthria (10.) 0      Extinction and Inattention (11.) (Formerly Neglect) 1      Total 3                    Flowsheet Row Most Recent Value   Thrombolytic Decision Options    Thrombolytic Decision Patient not a candidate.   Patient is not a candidate options Unclear time of onset outside appropriate time window., Bleeding risk.                                                History of Present Illness       Chief Complaint   Patient presents with    Altered Mental Status       Past Medical History:   Diagnosis Date    Allergic     Allergic to neomiacin and cephalexin    Cancer (HCC)     lung cancer    Cancer (HCC)     mets to brain    Cancer (HCC)     perianal mass    Cataract 2023    Due to have durgery 2024    Essential thrombocytosis (HCC)     controlled with medication    Hyperlipidemia     Hypertension     Mass in chest     Nodular goiter     Osteoporosis     Pneumonia Oct 16, 2023    Also  2024    Psoriasis     SIRS (systemic inflammatory response syndrome) (HCC) 2024      Past Surgical History:   Procedure Laterality Date    APPENDECTOMY      BREAST CYST EXCISION Right     COLONOSCOPY  10/31/2018    DXA PROCEDURE (HISTORICAL)  2017    IR BIOPSY OTHER  2024    IR LUMBAR PUNCTURE  2024    MAMMO (HISTORICAL)      8-24-18    MAMMO NEEDLE LOCALIZATION RIGHT (ALL INC) Right 2009    SKIN LESION EXCISION      bridge of nose      Family History   Problem Relation Age of Onset    Stroke Mother     Depression Mother             Hypertension Mother     Hearing loss Mother     Tuberculosis Father     Cancer Brother         lung    Tuberculosis Brother     Coronary artery disease Brother     Hypertension Brother     No Known Problems Daughter     No Known Problems Daughter     No Known Problems Maternal  Grandmother     No Known Problems Maternal Grandfather     No Known Problems Paternal Grandmother     No Known Problems Paternal Grandfather     No Known Problems Maternal Aunt     No Known Problems Maternal Aunt     No Known Problems Maternal Aunt     No Known Problems Maternal Aunt     No Known Problems Paternal Aunt     Cancer Brother         Throat cancer      Social History     Tobacco Use    Smoking status: Former     Current packs/day: 1.00     Average packs/day: 1 pack/day for 58.8 years (58.8 ttl pk-yrs)     Types: Cigarettes     Start date: 1966     Passive exposure: Past    Smokeless tobacco: Never    Tobacco comments:     Quit 2010   Vaping Use    Vaping status: Never Used   Substance Use Topics    Alcohol use: Not Currently    Drug use: Never      E-Cigarette/Vaping    E-Cigarette Use Never User       E-Cigarette/Vaping Substances    Nicotine No     THC No     CBD No     Flavoring No     Other No     Unknown No       I have reviewed and agree with the history as documented.     73-year-old female with history of HTN, HLD, lung cancer, metastatic cancer to brain, squamous cell cancer presents with altered mental status.  Per patient and family patient states that she went to bed earlier today, unknown time, and upon waking up felt confused.  She states that she did not know what was going on, did not know where she was, did not know what time or day it was and felt like something was wrong.  Patient called her sister-in-law at 1715 to notify her that she felt confused.    Unknown last known normal.    History limited due to altered mental status.  Sister-in-law states previous radiation treatment of the lungs, stereo static treatment of the mets to the brain.  She has been having tapering of her dexamethasone.  Previous history of similar symptoms with tapering of dexamethasone.    Patient otherwise denies fevers, chills, chest pain, shortness of breath, nausea, vomiting, inability to move extremities,  abdominal pain, dysuria, frequency, urgency, change in urinary or bowel habits, cough.      History provided by:  Patient (Brother and sister-in-law)  History limited by:  Mental status change and acuity of condition  Altered Mental Status      Review of Systems   All other systems reviewed and are negative.          Objective       ED Triage Vitals   Temperature Pulse Blood Pressure Respirations SpO2 Patient Position - Orthostatic VS   10/20/24 1826 10/20/24 1826 10/20/24 1826 10/20/24 1826 10/20/24 1826 10/20/24 2200   98.9 °F (37.2 °C) 95 (!) 181/86 17 99 % Lying      Temp Source Heart Rate Source BP Location FiO2 (%) Pain Score    10/20/24 1826 10/20/24 1826 10/21/24 0040 -- 10/20/24 2020    Oral Monitor Right arm  10 - Worst Possible Pain      Vitals      Date and Time Temp Pulse SpO2 Resp BP Pain Score FACES Pain Rating User   10/21/24 0040 98.3 °F (36.8 °C) 70 95 % 17 151/83 -- -- BL   10/21/24 0000 -- 84 97 % 16 164/83 -- -- SG   10/20/24 2345 -- 84 95 % 16 154/79 -- -- SG   10/20/24 2330 -- 80 95 % 16 161/80 -- -- SG   10/20/24 2315 -- 83 96 % 18 146/71 -- -- SG   10/20/24 2300 -- 81 94 % 18 164/81 -- -- SG   10/20/24 2245 -- 85 96 % 16 167/81 -- -- SG   10/20/24 2230 -- 85 95 % 16 154/68 -- -- SG   10/20/24 2215 -- 89 95 % 16 173/78 4 -- JA   10/20/24 2200 -- 86 95 % 16 161/75 4 -- JA   10/20/24 2145 -- 86 94 % 16 163/79 4 -- JA   10/20/24 2130 -- 85 95 % 18 164/79 -- -- SG   10/20/24 2115 -- 86 96 % 20 178/81 -- -- SG   10/20/24 2100 -- 85 97 % 18 163/72 -- -- SG   10/20/24 2045 -- 89 97 % 18 180/90 -- -- SG   10/20/24 2030 -- 90 96 % 18 199/85 -- -- SG   10/20/24 2020 -- -- -- -- -- 10 - Worst Possible Pain -- SG   10/20/24 2015 -- 87 97 % 20 199/93 -- -- SG   10/20/24 1945 -- 84 98 % 20 164/78 -- -- SG   10/20/24 1930 -- 86 -- 18 175/81 -- -- SG   10/20/24 1915 -- 87 99 % 20 190/84 -- -- SG   10/20/24 1900 -- 89 98 % 16 173/115 -- -- SG   10/20/24 1845 -- 93 98 % 18 213/90 -- -- SG   10/20/24 1830 -- 97  97 % 18 158/88 -- -- SG   10/20/24 1826 98.9 °F (37.2 °C) 95 99 % 17 181/86 -- -- SG            Physical Exam  Vitals and nursing note reviewed.   Constitutional:       General: She is not in acute distress.     Appearance: Normal appearance. She is well-developed and normal weight. She is not ill-appearing, toxic-appearing or diaphoretic.   HENT:      Head: Normocephalic and atraumatic.      Right Ear: External ear normal.      Left Ear: External ear normal.      Nose: Nose normal.      Mouth/Throat:      Mouth: Mucous membranes are moist.      Pharynx: Oropharynx is clear.   Eyes:      General: No scleral icterus.        Right eye: No discharge.         Left eye: No discharge.      Extraocular Movements: Extraocular movements intact.      Conjunctiva/sclera: Conjunctivae normal.      Pupils: Pupils are equal, round, and reactive to light.   Neck:      Comments: No midline C/T/L/S spine tenderness, step-offs, deformities.  Full range of motion of the cervical spine without tenderness.    Cardiovascular:      Rate and Rhythm: Normal rate and regular rhythm.      Pulses: Normal pulses.      Heart sounds: Normal heart sounds. No murmur heard.  Pulmonary:      Effort: Pulmonary effort is normal. No respiratory distress.      Breath sounds: Normal breath sounds. No stridor. No wheezing, rhonchi or rales.   Chest:      Chest wall: No tenderness.   Abdominal:      General: There is no distension.      Palpations: Abdomen is soft. There is no mass.      Tenderness: There is no abdominal tenderness. There is no right CVA tenderness, left CVA tenderness, guarding or rebound.      Hernia: No hernia is present.   Musculoskeletal:         General: No swelling, tenderness, deformity or signs of injury. Normal range of motion.      Cervical back: Normal range of motion and neck supple. No rigidity or tenderness.      Right lower leg: No edema.      Left lower leg: No edema.   Skin:     General: Skin is warm and dry.       Capillary Refill: Capillary refill takes less than 2 seconds.      Coloration: Skin is not cyanotic, jaundiced or pale.      Findings: No bruising, erythema, lesion, petechiae or rash.   Neurological:      General: No focal deficit present.      Mental Status: She is alert and oriented to person, place, and time. Mental status is at baseline.      Comments: -Face symmetrical and tongue midline. Normal speech.  -Cranial nerves II-XII intact  -Pronator drift negative  - strength strong and equal bilaterally  -Elbow flexor/extensor strength 5/5 bilaterally  -Sensation to light touch intact over distal arm bilaterally  -Finger to Nose testing with smooth motions but patient does not touch providers finger and will consistently touch in the same spot on provider's arm and return finger-to-nose.  -Hip flexor strength 5/5 bilaterally  -Knee flexor/extensor strength 5/5 bilaterally  -Heel flexor/extensor strength 5/5 bilaterally  -Sensation to light touch intact over lower extremities bilaterally but on palpation of the right upper extremity she states that she is being touched on the left upper extremity and on palpation of the left lower extremity was states she is being touched on the right lower extremity.  -Heel to shin testing abnormal and patient only noted to rub her heels up and down on the bed with smooth straight motions.         Results Reviewed       Procedure Component Value Units Date/Time    Platelet count [[id number]] Collected: 10/21/24 0038    Lab Status: No result Specimen: Blood from Arm, Right     Ammonia [[id number]] Collected: 10/21/24 0038    Lab Status: No result Specimen: Blood from Arm, Right     COVID/FLU/RSV [[id number]] Collected: 10/21/24 0030    Lab Status: No result Specimen: Nares from Nose     Sodium [[id number]]     Lab Status: No result Specimen: Blood     HS Troponin I 4hr [[id number]]  (Normal) Collected: 10/20/24 231    Lab Status: Final result Specimen: Blood from Arm, Left Updated:  10/21/24 0005     hs TnI 4hr 10 ng/L      Delta 4hr hsTnI 7 ng/L     HS Troponin I 2hr [[id number]]  (Normal) Collected: 10/20/24 2030    Lab Status: Final result Specimen: Blood from Arm, Left Updated: 10/20/24 2105     hs TnI 2hr 4 ng/L      Delta 2hr hsTnI 1 ng/L     Urine Microscopic [[id number]]  (Abnormal) Collected: 10/20/24 2030    Lab Status: Final result Specimen: Urine, Clean Catch Updated: 10/20/24 2046     RBC, UA 2-4 /hpf      WBC, UA 1-2 /hpf      Epithelial Cells None Seen /hpf      Bacteria, UA None Seen /hpf     UA w Reflex to Microscopic w Reflex to Culture [[id number]]  (Abnormal) Collected: 10/20/24 2030    Lab Status: Final result Specimen: Urine, Clean Catch Updated: 10/20/24 2044     Color, UA Colorless     Clarity, UA Clear     Specific Gravity, UA 1.029     pH, UA 6.5     Leukocytes, UA Negative     Nitrite, UA Negative     Protein, UA Negative mg/dl      Glucose, UA Negative mg/dl      Ketones, UA Negative mg/dl      Urobilinogen, UA <2.0 mg/dl      Bilirubin, UA Negative     Occult Blood, UA Trace    FLU/COVID Rapid Antigen (30 min. TAT) - Preferred screening test in ED [[id number]]  (Normal) Collected: 10/20/24 1900    Lab Status: Final result Specimen: Nares from Nose Updated: 10/20/24 2022     SARS COV Rapid Antigen Negative     Influenza A Rapid Antigen Negative     Influenza B Rapid Antigen Negative    Narrative:      This test has been performed using the Quidel Ghada 2 FLU+SARS Antigen test under the Emergency Use Authorization (EUA). This test has been validated by the  and verified by the performing laboratory. The Ghada uses lateral flow immunofluorescent sandwich assay to detect SARS-COV, Influenza A and Influenza B Antigen.     The Quidel Ghada 2 SARS Antigen test does not differentiate between SARS-CoV and SARS-CoV-2.     Negative results are presumptive and may be confirmed with a molecular assay, if necessary, for patient management. Negative results do not rule out  SARS-CoV-2 or influenza infection and should not be used as the sole basis for treatment or patient management decisions. A negative test result may occur if the level of antigen in a sample is below the limit of detection of this test.     Positive results are indicative of the presence of viral antigens, but do not rule out bacterial infection or co-infection with other viruses.     All test results should be used as an adjunct to clinical observations and other information available to the provider.    FOR PEDIATRIC PATIENTS - copy/paste COVID Guidelines URL to browser: [email or web address]    TSH, 3rd generation with Free T4 reflex [[id number]]  (Normal) Collected: 10/20/24 1830    Lab Status: Final result Specimen: Blood from Arm, Left Updated: 10/20/24 2021     TSH 3RD GENERATON 1.715 uIU/mL     Hepatic function panel [[id number]]  (Abnormal) Collected: 10/20/24 1830    Lab Status: Final result Specimen: Blood from Arm, Left Updated: 10/20/24 2021     Total Bilirubin 0.25 mg/dL      Bilirubin, Direct 0.03 mg/dL      Alkaline Phosphatase 45 U/L      AST 12 U/L      ALT 12 U/L      Total Protein 6.6 g/dL      Albumin 3.8 g/dL     Magnesium [[id number]]  (Normal) Collected: 10/20/24 1830    Lab Status: Final result Specimen: Blood from Arm, Left Updated: 10/20/24 2021     Magnesium 1.9 mg/dL     Blood culture #1 [[id number]] Collected: 10/20/24 1921    Lab Status: In process Specimen: Blood from Arm, Left Updated: 10/20/24 1927    Blood culture #2 [[id number]] Collected: 10/20/24 1910    Lab Status: In process Specimen: Blood from Arm, Left Updated: 10/20/24 1927    HS Troponin 0hr (reflex protocol) [[id number]]  (Normal) Collected: 10/20/24 1830    Lab Status: Final result Specimen: Blood from Arm, Left Updated: 10/20/24 1859     hs TnI 0hr 3 ng/L     Basic metabolic panel [[id number]]  (Abnormal) Collected: 10/20/24 1830    Lab Status: Final result Specimen: Blood  from Arm, Left Updated: 10/20/24 1851     Sodium 140 mmol/L      Potassium 3.8 mmol/L      Chloride 106 mmol/L      CO2 27 mmol/L      ANION GAP 7 mmol/L      BUN 17 mg/dL      Creatinine 0.93 mg/dL      Glucose 151 mg/dL      Calcium 9.0 mg/dL      eGFR 61 ml/min/1.73sq m     Narrative:      National Kidney Disease Foundation guidelines for Chronic Kidney Disease (CKD):     Stage 1 with normal or high GFR (GFR > 90 mL/min/1.73 square meters)    Stage 2 Mild CKD (GFR = 60-89 mL/min/1.73 square meters)    Stage 3A Moderate CKD (GFR = 45-59 mL/min/1.73 square meters)    Stage 3B Moderate CKD (GFR = 30-44 mL/min/1.73 square meters)    Stage 4 Severe CKD (GFR = 15-29 mL/min/1.73 square meters)    Stage 5 End Stage CKD (GFR <15 mL/min/1.73 square meters)  Note: GFR calculation is accurate only with a steady state creatinine    Protime-INR [[id number]]  (Normal) Collected: 10/20/24 1830    Lab Status: Final result Specimen: Blood from Arm, Left Updated: 10/20/24 1849     Protime 13.2 seconds      INR 0.94    Narrative:      INR Therapeutic Range    Indication                                             INR Range      Atrial Fibrillation                                               2.0-3.0  Hypercoagulable State                                    2.0.2.3  Left Ventricular Asist Device                            2.0-3.0  Mechanical Heart Valve                                  -    Aortic(with afib, MI, embolism, HF, LA enlargement,    and/or coagulopathy)                                     2.0-3.0 (2.5-3.5)     Mitral                                                             2.5-3.5  Prosthetic/Bioprosthetic Heart Valve               2.0-3.0  Venous thromboembolism (VTE: VT, PE        2.0-3.0    APTT [[id number]]  (Normal) Collected: 10/20/24 1830    Lab Status: Final result Specimen: Blood from Arm, Left Updated: 10/20/24 1849     PTT 29 seconds     CBC and Platelet [[id number]]  (Abnormal) Collected: 10/20/24 1830    Lab  Status: Final result Specimen: Blood from Arm, Left Updated: 10/20/24 1838     WBC 3.15 Thousand/uL      RBC 2.73 Million/uL      Hemoglobin 8.9 g/dL      Hematocrit 27.7 %       fL      MCH 32.6 pg      MCHC 32.1 g/dL      RDW 13.6 %      Platelets 148 Thousands/uL      MPV 8.4 fL     Fingerstick Glucose (POCT) [[id number]]  (Abnormal) Collected: 10/20/24 1826    Lab Status: Final result Specimen: Blood Updated: 10/20/24 1827     POC Glucose 156 mg/dl             CT stroke alert brain   Final Interpretation by Nelson Toscano MD (10/20 1915)      Stable vasogenic edema in the left parietal temporal region. Previously demonstrated left posterior parietal lesion is less well-visualized likely due to differences in technique.      Findings were directly discussed with Liang Luong at approximately  7:10 PM  .      Workstation performed: [id number]         CTA stroke alert (head/neck)   Final Interpretation by Nelson Toscano MD (10/20 1915)      No hemodynamically significant stenosis, dissection or occlusion of the carotid or vertebral arteries or major vessels of the [place].               Findings were directly discussed with Liang Luong at  7:13 PM  .      Workstation performed: [id number]         MRI inpatient order    (Results Pending)       Procedures    ED Medication and Procedure Management   Prior to Admission Medications   Prescriptions Last Dose Informant Patient Reported? Taking?   Cholecalciferol (VITAMIN D3) 5000 units TABS  Self Yes No   Sig: Take 2,000 Units by mouth Daily   HYDROmorphone (DILAUDID) 2 mg tablet   No No   Sig: Take 1 tablet (2 mg total) by mouth every 4 (four) hours as needed for severe pain 1 tab po q 4 hrs prn pain/dyspnea Max Daily Amount: 12 mg   Lidocaine 4 % PTCH  Self Yes No   Sig: Apply topically 4% applies to buttocks as needed every 8 to 12 hours   Patient not taking: Reported on 10/10/2024   MELATONIN PO   Yes No   Sig: Take 6 mg by mouth daily at  bedtime   Probiotic Product (PROBIOTIC DAILY PO)  Self Yes No   Sig: Take 1 capsule by mouth daily   Psyllium (METAMUCIL PO)  Self Yes No   Sig: Take by mouth Gummies for constipation   acetaminophen (TYLENOL) 325 mg tablet   Yes No   Sig: Take 325 mg by mouth every 6 (six) hours as needed for mild pain Taking 3 tabs every 6 hours   aspirin 81 mg chewable tablet  Self Yes No   Sig: Chew 81 mg daily.   celecoxib (CeleBREX) 100 mg capsule  Self No No   Sig: Take 2 capsules (200 mg total) by mouth 2 (two) times a day   dexamethasone (DECADRON) 1 mg tablet   No No   Sig: Take 1 tablet (1 mg total) by mouth daily with breakfast for 7 days, THEN 0.5 tablets (0.5 mg total) daily with breakfast for 8 days. Do not start before October 17, 2024.   dexamethasone (DECADRON) 2 mg tablet   No No   Sig: Take 1 tablet (2 mg total) by mouth 2 (two) times a day with meals   diphenhydrAMINE (BENADRYL) 25 mg capsule  Self Yes No   Sig: Take 25 mg by mouth every 12 (twelve) hours as needed for itching   folic acid (FOLVITE) 1 mg tablet  Self No No   Sig: Take 1 tablet (1 mg total) by mouth daily   gabapentin (NEURONTIN) 300 mg capsule   No No   Sig: Take 1 capsule (300 mg total) by mouth daily at bedtime   levETIRAcetam (Keppra) 750 mg tablet   No No   Sig: Take 1 tablet (750 mg total) by mouth 2 (two) times a day   pantoprazole (PROTONIX) 40 mg tablet  Self No No   Sig: TAKE 1 TABLET(40 MG) BY MOUTH DAILY EARLY MORNING   vitamin B-12 (VITAMIN B-12) 1,000 mcg tablet  Self No No   Sig: Take 1 tablet (1,000 mcg total) by mouth daily      Facility-Administered Medications: None     Current Discharge Medication List        CONTINUE these medications which have NOT CHANGED    Details   acetaminophen (TYLENOL) 325 mg tablet Take 325 mg by mouth every 6 (six) hours as needed for mild pain Taking 3 tabs every 6 hours      aspirin 81 mg chewable tablet Chew 81 mg daily.      celecoxib (CeleBREX) 100 mg capsule Take 2 capsules (200 mg total) by  mouth 2 (two) times a day  Qty: 30 capsule, Refills: 1    Associated Diagnoses: NSCLC of right lung (HCC); Anal pain      Cholecalciferol (VITAMIN D3) 5000 units TABS Take 2,000 Units by mouth Daily      !! dexamethasone (DECADRON) 1 mg tablet Take 1 tablet (1 mg total) by mouth daily with breakfast for 7 days, THEN 0.5 tablets (0.5 mg total) daily with breakfast for 8 days. Do not start before October 17, 2024.  Qty: 11 tablet, Refills: 0    Associated Diagnoses: Metastasis to brain (HCC)      !! dexamethasone (DECADRON) 2 mg tablet Take 1 tablet (2 mg total) by mouth 2 (two) times a day with meals  Qty: 14 tablet, Refills: 0    Associated Diagnoses: Metastasis to brain (HCC)      diphenhydrAMINE (BENADRYL) 25 mg capsule Take 25 mg by mouth every 12 (twelve) hours as needed for itching      folic acid (FOLVITE) 1 mg tablet Take 1 tablet (1 mg total) by mouth daily  Qty: 30 tablet, Refills: 6    Associated Diagnoses: Malignant neoplasm of upper lobe, right bronchus or lung (HCC)      gabapentin (NEURONTIN) 300 mg capsule Take 1 capsule (300 mg total) by mouth daily at bedtime  Qty: 30 capsule, Refills: 0    Associated Diagnoses: Palliative care patient; Cancer related pain      HYDROmorphone (DILAUDID) 2 mg tablet Take 1 tablet (2 mg total) by mouth every 4 (four) hours as needed for severe pain 1 tab po q 4 hrs prn pain/dyspnea Max Daily Amount: 12 mg  Qty: 90 tablet, Refills: 0    Associated Diagnoses: Palliative care patient; Cancer related pain      levETIRAcetam (Keppra) 750 mg tablet Take 1 tablet (750 mg total) by mouth 2 (two) times a day  Qty: 60 tablet, Refills: 0    Associated Diagnoses: Metastatic cancer to brain (HCC)      Lidocaine 4 % PTCH Apply topically 4% applies to buttocks as needed every 8 to 12 hours      MELATONIN PO Take 6 mg by mouth daily at bedtime      pantoprazole (PROTONIX) 40 mg tablet TAKE 1 TABLET(40 MG) BY MOUTH DAILY EARLY MORNING  Qty: 30 tablet, Refills: 5    Associated  Diagnoses: Metastatic cancer to brain (HCC)      Probiotic Product (PROBIOTIC DAILY PO) Take 1 capsule by mouth daily      Psyllium (METAMUCIL PO) Take by mouth Gummies for constipation      vitamin B-12 (VITAMIN B-12) 1,000 mcg tablet Take 1 tablet (1,000 mcg total) by mouth daily  Qty: 90 tablet, Refills: 1    Associated Diagnoses: B12 deficiency       !! - Potential duplicate medications found. Please discuss with provider.        No discharge procedures on file.  ED SEPSIS DOCUMENTATION   Time reflects when diagnosis was documented in both MDM as applicable and the Disposition within this note       Time User Action Codes Description Comment    10/20/2024  6:24 PM Verito Rust Add [R41.82] Altered mental status     10/20/2024 10:50 PM Hugh Leggett Add [G93.6] Vasogenic cerebral edema (HCC)     10/20/2024 10:51 PM Hugh Leggett Add [D53.9] Macrocytic anemia                  Hugh Leggett MD  10/21/24 0045

## 2024-10-21 NOTE — ASSESSMENT & PLAN NOTE
On 07/29, patient was found to have 5 intracranial metastatic brain lesions.  Last dose of SRS was on 8/8.  Loading dose of Keppra 2000 mg was given in the ED  Home dose of Keppra 750 mg twice daily was initiated  Will consult with hematology oncology at [place] for continued recommendations.

## 2024-10-21 NOTE — ASSESSMENT & PLAN NOTE
Most recent  with ASCVD of 18.4%.  Not on any statin therapy per chart review    Plan:  - Outpatient follow-up

## 2024-10-21 NOTE — PROGRESS NOTES
Progress Note - Critical Care/ICU   Name: Ayla Nye 73 y.o. female I MRN: [id number]  Unit/Bed#: ICU 10 I Date of Admission: 10/21/2024   Date of Service: 10/21/2024 I Hospital Day: 0       Interval Events:       Video EEG has been started.  Patient continues to have a headache of 4/10.  She was more confused during the steroid wean compared to previously.  Mental status improving with increased steroid regimen here.  Keppra dose increased as well.  Family updated at the bedside.    Pertinent New Data:     Deficits: R temporal visual field cut(pre-existing), left facial droop, mixing up location of commands(right hand instead of left or moves hands instead of toes)    CBC:   Lab Results   Component Value Date    WBC 3.04 (L) 10/21/2024    HGB 9.5 (L) 10/21/2024    HCT 29.0 (L) 10/21/2024     (H) 10/21/2024     10/21/2024    RBC 2.87 (L) 10/21/2024    MCH 33.1 10/21/2024    MCHC 32.8 10/21/2024    RDW 13.3 10/21/2024    MPV 8.8 (L) 10/21/2024    NRBC 0 10/21/2024   , CMP:   Lab Results   Component Value Date    SODIUM 140 10/21/2024    K 4.5 10/21/2024     10/21/2024    CO2 28 10/21/2024    BUN 12 10/21/2024    CREATININE 0.81 10/21/2024    CALCIUM 9.4 10/21/2024    AST 12 (L) 10/21/2024    ALT 12 10/21/2024    ALKPHOS 47 10/21/2024    EGFR 72 10/21/2024     Results Review Statement: I reviewed radiology reports from this admission including: CT head.    Assessment and Plan  SBP<160  CT CAP ordered to evaluate for additional signs of metastases  Continue video EEG for at least 24 hours  PT/OT ordered  Continue home levothyroxine 50 mcg  Continue Decadron 6 mg every 6 hours with Protonix 40 mg daily for GI prophylaxis  MRI brain ordered, per neurosurgery would wait until this is done prior to starting DVT prophylaxis  Stepdown 2    Holly Redmond MD

## 2024-10-21 NOTE — ASSESSMENT & PLAN NOTE
Stage IIIb non-small cell lung cancer of the right upper lobe.  Status post radiation therapy completed on 7/5/2024.   Will be consulting with radiology oncology for continued plan.

## 2024-10-21 NOTE — ASSESSMENT & PLAN NOTE
Suspect secondary to decreased Decadron versus seizure  Video EEG monitoring  Continue Keppra  Maintain Decadron for vasogenic edema  MRI when able

## 2024-10-21 NOTE — ASSESSMENT & PLAN NOTE
Acute encephalopathy secondary to seizures versus vasogenic edema.  CT head shows stable vasogenic edema in the left parietal temporal region.  Neurology consulted with recommendations to pursue video EEG for determination of seizure.  Neurocritical care consulted who recommended we perform a stat MRI  Initial read of EEG shows slowing in the left hemispheric slowing, but no seizure activity.  NS 75/h was started with the sodium goal of 145.  Decadron 10 mg was given in the ED.  Patient will be transferred to [place] for stat MRI.

## 2024-10-21 NOTE — ASSESSMENT & PLAN NOTE
Patient with history of non-small cell lung adenocarcinoma diagnosed in March 2024.  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  She completed SRS for these metastatic lesions in August 2024.  She is on Decadron in the outpatient setting and has had multiple admissions due to episodes of altered mental status with increased vasogenic edema after attempting to taper Decadron dosing.  She presented to [facility] on 10/20 with worsening confusion and a stroke alert was called.  Imaging showed no acute findings.  She was loaded with Keppra, started on Decadron, and transferred to [place] for video EEG and MRI brain under neurocritical care for close monitoring.  The morning of 10/21 patient's mental status reportedly much improved and she was deemed stable for transfer out of the ICU.    Of note, patient is not exactly sure what her current Decadron dosing is.  With that said, it appears that she had been on a Decadron taper throughout September starting at 8 mg daily that was reduced each week.  Her most recent orders show Decadron 1 mg daily from 10/17-10/24 and then 0.5 mg daily for 8 more days    Plan:  - Symptoms likely in the setting of Decadron taper  - Neurosurgery, neurology, and palliative care consulted.  Appreciate assistance  - Monitor on video EEG  - Continue Decadron 6 mg every 6 hours  - Continue Keppra 1000 mg every 12 hours  - Follow-up MRI brain  - SBP goal <160  - Stat CT head with any neurological decline for return of altered mental status  - Neurochecks every 4 hours  - Seizure precautions in place

## 2024-10-21 NOTE — ASSESSMENT & PLAN NOTE
Documented history of hypertension but not on any antihypertensives at home.  Blood pressure is acceptable this admission

## 2024-10-21 NOTE — PLAN OF CARE
Problem: PAIN - ADULT  Goal: Verbalizes/displays adequate comfort level or baseline comfort level  Description: Interventions:  - Encourage patient to monitor pain and request assistance  - Assess pain using appropriate pain scale  - Administer analgesics based on type and severity of pain and evaluate response  - Implement non-pharmacological measures as appropriate and evaluate response  - Consider cultural and social influences on pain and pain management  - Notify physician/advanced practitioner if interventions unsuccessful or patient reports new pain  Outcome: Progressing     Problem: INFECTION - ADULT  Goal: Absence or prevention of progression during hospitalization  Description: INTERVENTIONS:  - Assess and monitor for signs and symptoms of infection  - Monitor lab/diagnostic results  - Monitor all insertion sites, i.e. indwelling lines, tubes, and drains  - Monitor endotracheal if appropriate and nasal secretions for changes in amount and color  - [place] appropriate cooling/warming therapies per order  - Administer medications as ordered  - Instruct and encourage patient and family to use good hand hygiene technique  - Identify and instruct in appropriate isolation precautions for identified infection/condition  Outcome: Progressing  Goal: Absence of fever/infection during neutropenic period  Description: INTERVENTIONS:  - Monitor WBC    Outcome: Progressing     Problem: SAFETY ADULT  Goal: Patient will remain free of falls  Description: INTERVENTIONS:  - Educate patient/family on patient safety including physical limitations  - Instruct patient to call for assistance with activity   - Consult OT/PT to assist with strengthening/mobility   - Keep Call bell within reach  - Keep bed low and locked with side rails adjusted as appropriate  - Keep care items and personal belongings within reach  - Initiate and maintain comfort rounds  - Make Fall Risk Sign visible to staff  - Offer Toileting every 2 Hours,  in advance of need  - Initiate/Maintain bed/chair alarm  - Apply yellow socks and bracelet for high fall risk patients  - Consider moving patient to room near nurses station  Outcome: Progressing  Goal: Maintain or return to baseline ADL function  Description: INTERVENTIONS:  -  Assess patient's ability to carry out ADLs; assess patient's baseline for ADL function and identify physical deficits which impact ability to perform ADLs (bathing, care of mouth/teeth, toileting, grooming, dressing, etc.)  - Assess/evaluate cause of self-care deficits   - Assess range of motion  - Assess patient's mobility; develop plan if impaired  - Assess patient's need for assistive devices and provide as appropriate  - Encourage maximum independence but intervene and supervise when necessary  - Involve family in performance of ADLs  - Assess for home care needs following discharge   - Consider OT consult to assist with ADL evaluation and planning for discharge  - Provide patient education as appropriate  Outcome: Progressing  Goal: Maintains/Returns to pre admission functional level  Description: INTERVENTIONS:  - Perform AM-PAC 6 Click Basic Mobility/ Daily Activity assessment daily.  - Set and communicate daily mobility goal to care team and patient/family/caregiver.   - Collaborate with rehabilitation services on mobility goals if consulted  - Record patient progress and toleration of activity level   Outcome: Progressing     Problem: DISCHARGE PLANNING  Goal: Discharge to home or other facility with appropriate resources  Description: INTERVENTIONS:  - Identify barriers to discharge w/patient and caregiver  - Arrange for needed discharge resources and transportation as appropriate  - Identify discharge learning needs (meds, wound care, etc.)  - Arrange for interpretive services to assist at discharge as needed  - Refer to Case Management Department for coordinating discharge planning if the patient needs post-hospital services  based on physician/advanced practitioner order or complex needs related to functional status, cognitive ability, or social support system  Outcome: Progressing     Problem: Knowledge Deficit  Goal: Patient/family/caregiver demonstrates understanding of disease process, treatment plan, medications, and discharge instructions  Description: Complete learning assessment and assess knowledge base.  Interventions:  - Provide teaching at level of understanding  - Provide teaching via preferred learning methods  Outcome: Progressing

## 2024-10-21 NOTE — ASSESSMENT & PLAN NOTE
Known brain metastasis  History of NSC lung adenocarcinoma diagnosed in March 2024, status post chemo/RT.  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  Also diagnosed with mets to brain in July 2024, recommended whole brain radiation.  Ultimately she underwent SRS on 8/8/2024 to 5 enhancing lesions.  In the past attempted to taper steroids which led to neurological symptoms  Was admitted in September 2024 with AMS found to have increased cerebellar vasogenic edema after recent steroid taper.  Per chart review concern for possible leptomeningeal mets, underwent an LP which was negative for malignancy.  Also noted to have new tiny temporal lobe lesion.  As an outpatient she was on a Decadron taper per radiation oncology.  Patient's daughter states yesterday she was very confused and brought into the ED when she had difficulty speaking.  Of note she underwent MRI brain on 10/8/2024 which showed slight increase in size of left temporal lobe lesion with unchanged in 3 lesions and decreased size into other lesions with persistent diffuse vasogenic edema on left.    Imaging reviewed with :    CT head wo 10/20/24: Stable vasogenic edema in the left parietal temporal region. Previously demonstrated left posterior parietal lesion is less well-visualized likely due to differences in technique.     CTA head and neck w/wo 10/20/24:No hemodynamically significant stenosis, dissection or occlusion of the carotid or vertebral arteries or major vessels of the [place].    Plan:  Exam:GCS 15, no strength or sensory deficits. FTN discoordination B/L  Continue frequent neurological checks.   Reviewed imaging with patient and daughter at bedside  STAT CT head with any neurological decline including drop GCS of 2pts within 1 hr.  Recommend SBP <160mmHg.  MRI brain with and without pending to further evaluate known brain metastasis  On Decadron 6 mg every 6 hours    Recommend CT chest abdomen pelvis to assess for possible other underlying new lesions  Neurology following, input appreciated  Patient's home Keppra dose was increased to 1000 mg twice daily for concern for seizure  vEEG pending  Hold all antiplatelet and anticoagulation medications.   Medical management pain control per primary team  Per NCC BCs x 2 pending, WBCs 3.04.  Platelets 178  No emergent neurosurgical intervention warranted  Mobilize with PT/OT  DVT PPX: SCDs     Neurosurgery will continue to follow and review MRI once completed, call with any further question or concerns.

## 2024-10-22 ENCOUNTER — APPOINTMENT (INPATIENT)
Dept: RADIOLOGY | Facility: HOSPITAL | Age: 74
DRG: 080 | End: 2024-10-22
Payer: MEDICARE

## 2024-10-22 ENCOUNTER — APPOINTMENT (INPATIENT)
Dept: NEUROLOGY | Facility: CLINIC | Age: 74
DRG: 080 | End: 2024-10-22
Payer: MEDICARE

## 2024-10-22 ENCOUNTER — APPOINTMENT (OUTPATIENT)
Dept: RADIATION ONCOLOGY | Facility: HOSPITAL | Age: 74
End: 2024-10-22
Attending: INTERNAL MEDICINE
Payer: MEDICARE

## 2024-10-22 ENCOUNTER — APPOINTMENT (OUTPATIENT)
Dept: RADIATION ONCOLOGY | Facility: HOSPITAL | Age: 74
End: 2024-10-22
Payer: MEDICARE

## 2024-10-22 LAB
ANION GAP SERPL CALCULATED.3IONS-SCNC: 10 MMOL/L (ref 4–13)
ANISOCYTOSIS BLD QL SMEAR: PRESENT
ATRIAL RATE: 73 BPM
BASOPHILS # BLD MANUAL: 0 THOUSAND/UL (ref 0–0.1)
BASOPHILS NFR MAR MANUAL: 0 % (ref 0–1)
BUN SERPL-MCNC: 25 MG/DL (ref 5–25)
CALCIUM SERPL-MCNC: 9.1 MG/DL (ref 8.4–10.2)
CHLORIDE SERPL-SCNC: 103 MMOL/L (ref 96–108)
CO2 SERPL-SCNC: 27 MMOL/L (ref 21–32)
CREAT SERPL-MCNC: 1 MG/DL (ref 0.6–1.3)
EOSINOPHIL # BLD MANUAL: 0 THOUSAND/UL (ref 0–0.4)
EOSINOPHIL NFR BLD MANUAL: 0 % (ref 0–6)
ERYTHROCYTE [DISTWIDTH] IN BLOOD BY AUTOMATED COUNT: 13.3 % (ref 11.6–15.1)
GFR SERPL CREATININE-BSD FRML MDRD: 56 ML/MIN/1.73SQ M
GLUCOSE SERPL-MCNC: 142 MG/DL (ref 65–140)
HCT VFR BLD AUTO: 28.5 % (ref 34.8–46.1)
HGB BLD-MCNC: 9.3 G/DL (ref 11.5–15.4)
LYMPHOCYTES # BLD AUTO: 0.19 THOUSAND/UL (ref 0.6–4.47)
LYMPHOCYTES # BLD AUTO: 2 % (ref 14–44)
MCH RBC QN AUTO: 32.4 PG (ref 26.8–34.3)
MCHC RBC AUTO-ENTMCNC: 32.6 G/DL (ref 31.4–37.4)
MCV RBC AUTO: 99 FL (ref 82–98)
MONOCYTES # BLD AUTO: 0.09 THOUSAND/UL (ref 0–1.22)
MONOCYTES NFR BLD: 2 % (ref 4–12)
NEUTROPHILS # BLD MANUAL: 4.39 THOUSAND/UL (ref 1.85–7.62)
NEUTS BAND NFR BLD MANUAL: 1 % (ref 0–8)
NEUTS SEG NFR BLD AUTO: 93 % (ref 43–75)
P AXIS: 68 DEGREES
PLATELET # BLD AUTO: 250 THOUSANDS/UL (ref 149–390)
PLATELET BLD QL SMEAR: ADEQUATE
PMV BLD AUTO: 8.8 FL (ref 8.9–12.7)
POLYCHROMASIA BLD QL SMEAR: PRESENT
POTASSIUM SERPL-SCNC: 4.1 MMOL/L (ref 3.5–5.3)
PR INTERVAL: 163 MS
QRS AXIS: 28 DEGREES
QRSD INTERVAL: 79 MS
QT INTERVAL: 446 MS
QTC INTERVAL: 492 MS
RBC # BLD AUTO: 2.87 MILLION/UL (ref 3.81–5.12)
RBC MORPH BLD: PRESENT
SODIUM SERPL-SCNC: 140 MMOL/L (ref 135–147)
T WAVE AXIS: 36 DEGREES
VARIANT LYMPHS # BLD AUTO: 2 %
VENTRICULAR RATE: 73 BPM
WBC # BLD AUTO: 4.67 THOUSAND/UL (ref 4.31–10.16)

## 2024-10-22 PROCEDURE — 71260 CT THORAX DX C+: CPT

## 2024-10-22 PROCEDURE — 93010 ELECTROCARDIOGRAM REPORT: CPT | Performed by: INTERNAL MEDICINE

## 2024-10-22 PROCEDURE — 95715 VEEG EA 12-26HR INTMT MNTR: CPT

## 2024-10-22 PROCEDURE — 99223 1ST HOSP IP/OBS HIGH 75: CPT | Performed by: PSYCHIATRY & NEUROLOGY

## 2024-10-22 PROCEDURE — 99233 SBSQ HOSP IP/OBS HIGH 50: CPT | Performed by: NEUROLOGICAL SURGERY

## 2024-10-22 PROCEDURE — 99233 SBSQ HOSP IP/OBS HIGH 50: CPT | Performed by: INTERNAL MEDICINE

## 2024-10-22 PROCEDURE — 85027 COMPLETE CBC AUTOMATED: CPT

## 2024-10-22 PROCEDURE — 85007 BL SMEAR W/DIFF WBC COUNT: CPT

## 2024-10-22 PROCEDURE — 74177 CT ABD & PELVIS W/CONTRAST: CPT

## 2024-10-22 PROCEDURE — 99232 SBSQ HOSP IP/OBS MODERATE 35: CPT | Performed by: INTERNAL MEDICINE

## 2024-10-22 PROCEDURE — 80048 BASIC METABOLIC PNL TOTAL CA: CPT

## 2024-10-22 RX ORDER — ATORVASTATIN CALCIUM 20 MG/1
20 TABLET, FILM COATED ORAL
Status: DISCONTINUED | OUTPATIENT
Start: 2024-10-22 | End: 2024-11-04 | Stop reason: HOSPADM

## 2024-10-22 RX ORDER — ACETAMINOPHEN 325 MG/1
650 TABLET ORAL EVERY 6 HOURS SCHEDULED
Status: DISCONTINUED | OUTPATIENT
Start: 2024-10-22 | End: 2024-11-04 | Stop reason: HOSPADM

## 2024-10-22 RX ORDER — AMLODIPINE BESYLATE 5 MG/1
5 TABLET ORAL DAILY
Status: DISCONTINUED | OUTPATIENT
Start: 2024-10-22 | End: 2024-11-04 | Stop reason: HOSPADM

## 2024-10-22 RX ORDER — ENOXAPARIN SODIUM 100 MG/ML
30 INJECTION SUBCUTANEOUS
Status: DISCONTINUED | OUTPATIENT
Start: 2024-10-22 | End: 2024-11-04 | Stop reason: HOSPADM

## 2024-10-22 RX ADMIN — ATORVASTATIN CALCIUM 20 MG: 20 TABLET, FILM COATED ORAL at 16:27

## 2024-10-22 RX ADMIN — ACETAMINOPHEN 650 MG: 325 TABLET, FILM COATED ORAL at 12:54

## 2024-10-22 RX ADMIN — LEVOTHYROXINE SODIUM 50 MCG: 50 TABLET ORAL at 05:30

## 2024-10-22 RX ADMIN — DEXAMETHASONE SODIUM PHOSPHATE 6 MG: 10 INJECTION INTRAMUSCULAR; INTRAVENOUS at 17:39

## 2024-10-22 RX ADMIN — LEVETIRACETAM 1000 MG: 100 INJECTION, SOLUTION INTRAVENOUS at 21:52

## 2024-10-22 RX ADMIN — PANTOPRAZOLE SODIUM 40 MG: 40 TABLET, DELAYED RELEASE ORAL at 05:30

## 2024-10-22 RX ADMIN — AMLODIPINE BESYLATE 5 MG: 5 TABLET ORAL at 10:41

## 2024-10-22 RX ADMIN — DEXAMETHASONE SODIUM PHOSPHATE 6 MG: 10 INJECTION INTRAMUSCULAR; INTRAVENOUS at 12:54

## 2024-10-22 RX ADMIN — DEXAMETHASONE SODIUM PHOSPHATE 6 MG: 10 INJECTION INTRAMUSCULAR; INTRAVENOUS at 23:40

## 2024-10-22 RX ADMIN — IOHEXOL 85 ML: 350 INJECTION, SOLUTION INTRAVENOUS at 17:03

## 2024-10-22 RX ADMIN — Medication 3 MG: at 23:40

## 2024-10-22 RX ADMIN — DEXAMETHASONE SODIUM PHOSPHATE 6 MG: 10 INJECTION INTRAMUSCULAR; INTRAVENOUS at 05:30

## 2024-10-22 RX ADMIN — LEVETIRACETAM 1000 MG: 100 INJECTION, SOLUTION INTRAVENOUS at 08:54

## 2024-10-22 RX ADMIN — ACETAMINOPHEN 975 MG: 325 TABLET, FILM COATED ORAL at 05:30

## 2024-10-22 RX ADMIN — ACETAMINOPHEN 650 MG: 325 TABLET, FILM COATED ORAL at 23:40

## 2024-10-22 RX ADMIN — ACETAMINOPHEN 650 MG: 325 TABLET, FILM COATED ORAL at 17:39

## 2024-10-22 NOTE — HOSPITAL COURSE
73-year-old female with a past medical history of recently diagnosed non-small cell lung cancer with mets to the brain, Anand-2 positive essential thrombocytosis, hypertension, hyperlipidemia, and hypothyroidism who presented to [facility] on 10/20 due to altered mental status.  Of note, patient has had multiple admissions over the last few months due to altered mental status with increased cerebral vasogenic edema after her Decadron dose gets tapered. Patient progressively became more confused throughout the day prompting evaluation.  Collateral was called and she underwent CT head and CTA H/N.  CT head showed stable vasogenic edema in the left parietal temporal region.  CTA H/N showed no acute findings.  Patient was given multiple doses of IV Decadron and loaded with Keppra.  She was then transferred to [facility] for video EEG and MRI brain under neurocritical care service.  Patient's altered mental status resolved overnight and he was deemed stable for transfer out of the ICU. During her stay in the ICU, neurosurgery ordered CT Cx/A/P to evaluate the progression of cancer and revealed new small bilateral pulmonary nodules, likely metastasis, new right renal lesion(s) concerning for metastasis, significant interval enlargement of necrotic appearing mass in the right ischiorectal fossa, likely metastasis, deceasing RUL necrotic mass with stable and/or decreasing satellite nodularity, improved mediastinal and right hilar adenopathy, stable 2.4 cm CHUYITA groundglass density.     For vasogenic edema , repeat MRI brain showed stable vasogenic edema in the left parietotemporal region; previously demonstrated left posterior parietal lesion as well visualized likely due to differences in technique.  No new metastasis, no new acute infarct or hemorrhage. Patient was treated with Decadron 6 mg every 6 hours and Keppra 1000 mg every 12 hours. vEEG 26 hours of recording. No seizures. Continues to have left hemisphere slowing,  left posterior quadrant maximal. There are also left posterior temporal / parietal poorly formed (blunt and at times sharply contoured) LPDs occurring at 0.5-1 Hz. LPDs indicate high seizure risk and are often associated with acute/subacute destructive neuronal injury. Right hemisphere is essentially normal. Primary team reached out to her Rad-onc to confirm her taper regimen as following:  Decadron 4mg PO every 6 hours for 5 days until 10/28/24  Decadron 4mg PO twice daily for 5 days from 10/29/24 to 11/2/24  Decadron 2mg PO twice daily for 5 days from 11/3/24 to 11/7/24  Decadron 2mg PO daily until her next MRI on 12/27 (she can try to self taper to 1mg during this interval if she wishes)        Her BP was in the range of 140-150s/70s with a few readings over 160, amlodipine 5 mg daily was started. BP has been 120-130s/70s. Lipitor 20 mg at bedtime was started as well given 10-year ASCVD risk of 18.4%. Palliative care was consulted with regimen of scheduled Tylenol as patient preferred to avoid opioids and gabapentin. However, after starting gabapentin trial, patient endorsed better pain control and better sleep. We continued gabapentin 300 mg at bedtime, 100 mg in the morning, and 100 mg in the afternoon. Towards the end of her hospitalization course, patient started having sore throat with dry cough and rhinorrhea. Patient stated that her daughter was tested positive for COVID-19. Patient was tested positive as well and 5-day course of Paxlovid initiated on 11/2/24.

## 2024-10-22 NOTE — ASSESSMENT & PLAN NOTE
Known brain metastasis  History of NSC lung adenocarcinoma diagnosed in March 2024, status post chemo/RT.  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  Also diagnosed with mets to brain in July 2024, recommended whole brain radiation.  Ultimately she underwent SRS on 8/8/2024 to 5 enhancing lesions.  In the past attempted to taper steroids which led to neurological symptoms  Was admitted in September 2024 with AMS found to have increased cerebellar vasogenic edema after recent steroid taper.  Per chart review concern for possible leptomeningeal mets, underwent an LP which was negative for malignancy.  Also noted to have new tiny temporal lobe lesion.  As an outpatient she was on a Decadron taper per radiation oncology.  Patient's daughter states yesterday she was very confused and brought into the ED when she had difficulty speaking.  Of note she underwent MRI brain on 10/8/2024 which showed slight increase in size of left temporal lobe lesion with unchanged in 3 lesions and decreased size into other lesions with persistent diffuse vasogenic edema on left.    Imaging reviewed with Dr. Cross:  MRI brain seizures with without 10/21/24: Demonstration of intracranial metastatic lesions.  2.3 cm enhancing lesion at the left occipital lobe with slightly increase in maximum dimension with increased central necrosis at the anterior margin that may be partially related to radiation necrosis.  Smaller metastases are stable or mildly decreased in size.  Stable left parietal subdural edema and mass effect of the left lateral ventricle.  No new metastasis.  No acute infarct or hemorrhage.  CT head wo 10/20/24: Stable vasogenic edema in the left parietal temporal region. Previously demonstrated left posterior parietal lesion is less well-visualized likely due to differences in technique.       Plan:  Case and imaging reviewed at morning rounds with Dr. Cross.  At this time  would recommend ongoing management with medical and radiation oncology.  No neurosurgical intervention recommended at this juncture.  Continue frequent neurological checks.   Reviewed MRI results with patient  STAT CT head with any neurological decline including drop GCS of 2pts within 1 hr.  Recommend SBP <160mmHg.  On Decadron 6 mg every 6 hours -defer weaning to medical or radiation oncology  Recommend CT chest abdomen pelvis to assess for possible other underlying new lesions -ordered and pending  Neurology following, input appreciated  Patient's home Keppra dose was increased to 1000 mg twice daily for concern for seizure  vEEG in process  Medical management pain control per primary team  Per NCC BCs x 2 pending, WBCs 3.04.  Platelets 178  Mobilize with PT/OT  DVT PPX: SCDs.  Lovenox    Neurosurgery will sign off.  Call with any further question or concerns.

## 2024-10-22 NOTE — ASSESSMENT & PLAN NOTE
Patient with history of non-small cell lung adenocarcinoma diagnosed in March 2024.  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  She completed SRS for these metastatic lesions in August 2024.  She is on Decadron in the outpatient setting and has had multiple admissions due to episodes of altered mental status with increased vasogenic edema after attempting to taper Decadron dosing.  She presented to [facility] on 10/20 with worsening confusion and a stroke alert was called.  Imaging showed no acute findings.  She was loaded with Keppra, started on Decadron, and transferred to [place] for video EEG and MRI brain under neurocritical care for close monitoring.  The morning of 10/21 patient's mental status reportedly much improved and she was deemed stable for transfer out of the ICU.    Symptoms likely in the setting of Decadron taper    Of note, patient is not exactly sure what her current Decadron dosing is.  With that said, it appears that she had been on a Decadron taper throughout September starting at 8 mg daily that was reduced each week.  Her most recent orders show Decadron 1 mg daily from 10/17-10/24 and then 0.5 mg daily for 8 more days    vEEG update as of today: 26 hours of recording. No seizures. Continues to have left hemisphere slowing, left posterior quadrant maximal. There are also left posterior temporal / parietal poorly formed (blunt and at times sharply contoured) LPDs occurring at 0.5-1 Hz. LPDs indicate high seizure risk and are often associated with acute/subacute destructive neuronal injury. Right hemisphere is essentially normal.     Blood cultures no growth at 24 hours    Plan:  - MRI 10/21 show stable vasogenic edema in the left parietotemporal region; previously demonstrated left posterior parietal lesion as well visualized likely due to differences in technique.  No new metastasis, no new acute infarct or hemorrhage.  - Start VTE  prophylaxis with Lovenox  today  - Per neurosurgery, no emergent neurosurgical intervention warranted  - Neurosurgery, neurology, and palliative care consulted.  Appreciate assistance  - Monitor on video EEG  - Continue Decadron 6 mg every 6 hours  - Continue Keppra 1000 mg every 12 hours  - Continue monitoring glucose given on dexamethasone  - SBP goal <160  - Stat CT head with any neurological decline for return of altered mental status  - Neurochecks every 4 hours  - Seizure precautions in place  - F/u on CT Cx/A/P for possible new underlying lesions  - PT/OT recommended level 1

## 2024-10-22 NOTE — PLAN OF CARE
Problem: PAIN - ADULT  Goal: Verbalizes/displays adequate comfort level or baseline comfort level  Description: Interventions:  - Encourage patient to monitor pain and request assistance  - Assess pain using appropriate pain scale  - Administer analgesics based on type and severity of pain and evaluate response  - Implement non-pharmacological measures as appropriate and evaluate response  - Consider cultural and social influences on pain and pain management  - Notify physician/advanced practitioner if interventions unsuccessful or patient reports new pain  Outcome: Progressing     Problem: INFECTION - ADULT  Goal: Absence or prevention of progression during hospitalization  Description: INTERVENTIONS:  - Assess and monitor for signs and symptoms of infection  - Monitor lab/diagnostic results  - Monitor all insertion sites, i.e. indwelling lines, tubes, and drains  - Monitor endotracheal if appropriate and nasal secretions for changes in amount and color  - [place] appropriate cooling/warming therapies per order  - Administer medications as ordered  - Instruct and encourage patient and family to use good hand hygiene technique  - Identify and instruct in appropriate isolation precautions for identified infection/condition  Outcome: Progressing  Goal: Absence of fever/infection during neutropenic period  Description: INTERVENTIONS:  - Monitor WBC    Outcome: Progressing     Problem: SAFETY ADULT  Goal: Patient will remain free of falls  Description: INTERVENTIONS:  - Educate patient/family on patient safety including physical limitations  - Instruct patient to call for assistance with activity   - Consult OT/PT to assist with strengthening/mobility   - Keep Call bell within reach  - Keep bed low and locked with side rails adjusted as appropriate  - Keep care items and personal belongings within reach  - Initiate and maintain comfort rounds  - Make Fall Risk Sign visible to staff  - Offer Toileting every 2 Hours,  in advance of need  - Initiate/Maintain bed alarm  - Obtain necessary fall risk management equipment  - Apply yellow socks and bracelet for high fall risk patients  - Consider moving patient to room near nurses station  Outcome: Progressing  Goal: Maintain or return to baseline ADL function  Description: INTERVENTIONS:  -  Assess patient's ability to carry out ADLs; assess patient's baseline for ADL function and identify physical deficits which impact ability to perform ADLs (bathing, care of mouth/teeth, toileting, grooming, dressing, etc.)  - Assess/evaluate cause of self-care deficits   - Assess range of motion  - Assess patient's mobility; develop plan if impaired  - Assess patient's need for assistive devices and provide as appropriate  - Encourage maximum independence but intervene and supervise when necessary  - Involve family in performance of ADLs  - Assess for home care needs following discharge   - Consider OT consult to assist with ADL evaluation and planning for discharge  - Provide patient education as appropriate  Outcome: Progressing  Goal: Maintains/Returns to pre admission functional level  Description: INTERVENTIONS:  - Perform AM-PAC 6 Click Basic Mobility/ Daily Activity assessment daily.  - Set and communicate daily mobility goal to care team and patient/family/caregiver.   - Collaborate with rehabilitation services on mobility goals if consulted  - Perform Range of Motion 3 times a day.  - Reposition patient every 2 hours.  - Dangle patient 3 times a day  - Stand patient 3 times a day  - Ambulate patient 3 times a day  - Out of bed to chair 3 times a day   - Out of bed for meals 3 times a day  - Out of bed for toileting  - Record patient progress and toleration of activity level   Outcome: Progressing     Problem: DISCHARGE PLANNING  Goal: Discharge to home or other facility with appropriate resources  Description: INTERVENTIONS:  - Identify barriers to discharge w/patient and caregiver  -  Arrange for needed discharge resources and transportation as appropriate  - Identify discharge learning needs (meds, wound care, etc.)  - Arrange for interpretive services to assist at discharge as needed  - Refer to Case Management Department for coordinating discharge planning if the patient needs post-hospital services based on physician/advanced practitioner order or complex needs related to functional status, cognitive ability, or social support system  Outcome: Progressing     Problem: Knowledge Deficit  Goal: Patient/family/caregiver demonstrates understanding of disease process, treatment plan, medications, and discharge instructions  Description: Complete learning assessment and assess knowledge base.  Interventions:  - Provide teaching at level of understanding  - Provide teaching via preferred learning methods  Outcome: Progressing

## 2024-10-22 NOTE — ASSESSMENT & PLAN NOTE
Documented history of hypertension but not on any antihypertensives at home.  Blood pressure is acceptable this admission  Patient and chart review, patient usually taking multipin 5 mg and losartan 50 mg daily before being discontinued in June 2024    BP overnight high 140s-150s/70s    Plan:  - Start amlodipine 5 mg today

## 2024-10-22 NOTE — ASSESSMENT & PLAN NOTE
Lipid panel 7/20/2024: , TG 76, HDL 60,   ASCVD of 18.4%.  Not on any statin therapy per chart review    Plan:  - Start Lipitor 20 mg daily

## 2024-10-22 NOTE — PROGRESS NOTES
Progress Note - Internal Medicine   Name: Ayla Nye 73 y.o. female I MRN: [id number]  [facility] Date of Admission: 10/21/2024   Date of Service: 10/22/2024 I Hospital Day: 1    Assessment & Plan  Metastatic cancer to brain (HCC)  Patient with history of non-small cell lung adenocarcinoma diagnosed in March 2024.  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  She completed SRS for these metastatic lesions in August 2024.  She is on Decadron in the outpatient setting and has had multiple admissions due to episodes of altered mental status with increased vasogenic edema after attempting to taper Decadron dosing.  She presented to [facility] on 10/20 with worsening confusion and a stroke alert was called.  Imaging showed no acute findings.  She was loaded with Keppra, started on Decadron, and transferred to [place] for video EEG and MRI brain under neurocritical care for close monitoring.  The morning of 10/21 patient's mental status reportedly much improved and she was deemed stable for transfer out of the ICU.    Symptoms likely in the setting of Decadron taper    Of note, patient is not exactly sure what her current Decadron dosing is.  With that said, it appears that she had been on a Decadron taper throughout September starting at 8 mg daily that was reduced each week.  Her most recent orders show Decadron 1 mg daily from 10/17-10/24 and then 0.5 mg daily for 8 more days    vEEG update as of today: 26 hours of recording. No seizures. Continues to have left hemisphere slowing, left posterior quadrant maximal. There are also left posterior temporal / parietal poorly formed (blunt and at times sharply contoured) LPDs occurring at 0.5-1 Hz. LPDs indicate high seizure risk and are often associated with acute/subacute destructive neuronal injury. Right hemisphere is essentially normal.     Blood cultures no growth at 24 hours    Plan:  - MRI  10/21 show stable vasogenic edema in the left parietotemporal region; previously demonstrated left posterior parietal lesion as well visualized likely due to differences in technique.  No new metastasis, no new acute infarct or hemorrhage.  - Start VTE prophylaxis with Lovenox  today  - Per neurosurgery, no emergent neurosurgical intervention warranted  - Neurosurgery, neurology, and palliative care consulted.  Appreciate assistance  - Monitor on video EEG  - Continue Decadron 6 mg every 6 hours  - Continue Keppra 1000 mg every 12 hours  - Continue monitoring glucose given on dexamethasone  - SBP goal <160  - Stat CT head with any neurological decline for return of altered mental status  - Neurochecks every 4 hours  - Seizure precautions in place  - F/u on CT Cx/A/P for possible new underlying lesions  - PT/OT recommended level 1  Hypertension  Documented history of hypertension but not on any antihypertensives at home.  Blood pressure is acceptable this admission  Patient and chart review, patient usually taking multipin 5 mg and losartan 50 mg daily before being discontinued in June 2024    BP overnight high 140s-150s/70s    Plan:  - Start amlodipine 5 mg today  Mixed hyperlipidemia  Lipid panel 7/20/2024: , TG 76, HDL 60,   ASCVD of 18.4%.  Not on any statin therapy per chart review    Plan:  - Start Lipitor 20 mg daily  Malignant neoplasm of upper lobe, right bronchus or lung (HCC)  Patient with a history of non-small cell lung cancer of the right upper lobe with metastasis to the brain diagnosed in March 2024.  She was on weekly carboplatin and paclitaxel with radiation from 5/23/2024 - 7/5/2024.  Further imaging after treatment showed signs concerning of recurrent metastatic NSCLC now with possible metastasis to the pelvis.  For this, it was recommended that she undergo systemic chemoimmunotherapy with carboplatin, pemetrexed, and pembrolizumab.  Her first cycle was on 10/7/2024.    Plan:  -  Patient following closely with oncology in the outpatient setting  - Remainder of care for active concerns as mentioned above  Hypothyroidism  Continue PTA levothyroxine 50 mcg daily  Palliative care patient  Palliative care following  Given GFR less than 60, as needed Toradol was discontinued today    Plan:  - Palliative following. Change Tylenol from every 8 hours to 650 mg every 6 hours  - Patient would like to avoid opioids and gabapentin if possible    Disposition: Continue inpatient for medical care     Team: SOD TEAM C    Subjective   Patient seen and examined. No acute events overnight.  Patient states she is doing well.  She is aware that her repeat MRI of the brain shows stable vasogenic edema in the left parietal temporal region. Denies headache, dizziness, chest pain discomfort, abdominal pain, and urinary symptoms.    Objective :  Temp:  [97.5 °F (36.4 °C)-97.9 °F (36.6 °C)] 97.9 °F (36.6 °C)  HR:  [60-76] 65  BP: (105-156)/(60-87) 150/81  Resp:  [16-29] 21  SpO2:  [95 %-98 %] 95 %  O2 Device: None (Room air)    I/O         10/20 0701  10/21 0700 10/21 0701  10/22 0700    I.V. (mL/kg) 146.3 442.5 (8.5)    IV Piggyback  50    Total Intake(mL/kg) 146.3 492.5 (9.4)    Urine (mL/kg/hr) 650 1165 (0.9)    Total Output 650 1165    Net -503.8 -672.5          Unmeasured Urine Occurrence  1 x          Weights:        Body mass index is 21.09 kg/m².  Weight (last 2 days)       Date/Time Weight    10/22/24 0559 52.3 (115.3)            Physical Exam  Vitals and nursing note reviewed.   Constitutional:       General: She is not in acute distress.     Appearance: She is well-developed.   HENT:      Head: Normocephalic and atraumatic.   Eyes:      General: No scleral icterus.     Extraocular Movements: Extraocular movements intact.      Conjunctiva/sclera: Conjunctivae normal.   Cardiovascular:      Rate and Rhythm: Normal rate and regular rhythm.      Pulses: Normal pulses.      Heart sounds: Normal heart sounds. No  murmur heard.  Pulmonary:      Effort: Pulmonary effort is normal. No respiratory distress.      Breath sounds: Normal breath sounds.   Abdominal:      General: Bowel sounds are normal.      Palpations: Abdomen is soft.      Tenderness: There is no abdominal tenderness.   Musculoskeletal:         General: No swelling.      Cervical back: Neck supple.      Right lower leg: No edema.      Left lower leg: No edema.   Skin:     General: Skin is warm and dry.      Capillary Refill: Capillary refill takes less than 2 seconds.      Findings: No bruising or lesion.   Neurological:      General: No focal deficit present.      Mental Status: She is alert.      Cranial Nerves: Cranial nerves 2-12 are intact.   Psychiatric:         Mood and Affect: Mood normal.           Lab Results: I have reviewed the following results:  Recent Labs     10/20/24  1830 10/20/24  2030 10/21/24  0038 10/21/24  0448 10/22/24  0520   WBC 3.15*  --   --  3.04* 4.67   HGB 8.9*  --   --  9.5* 9.3*   HCT 27.7*  --   --  29.0* 28.5*   *  --    < > 178 250   SODIUM 140  --   --  140 140   K 3.8  --   --  4.5 4.1     --   --  102 103   CO2 27  --   --  28 27   BUN 17  --   --  12 25   CREATININE 0.93  --   --  0.81 1.00   GLUC 151*  --   --  153* 142*   CAIONIZED  --   --   --  1.21  --    MG 1.9  --   --  2.7  --    PHOS  --   --   --  3.9  --    AST 12*  --   --  12*  --    ALT 12  --   --  12  --    ALB 3.8  --   --  3.9  --    TBILI 0.25  --   --  0.27  --    ALKPHOS 45  --   --  47  --    PTT 29  --   --   --   --    INR 0.94  --   --   --   --    HSTNI0 3  --   --   --   --    HSTNI2  --  4  --   --   --     < > = values in this interval not displayed.       Imaging Results Review: I personally reviewed the following image studies in PACS and associated radiology reports: chest xray, CT head, CT A neck and head, and MRI brain. My interpretation of the radiology images/reports is:  .  Other Study Results Review: EKG was reviewed.      Currently Ordered Meds:   Current Facility-Administered Medications:     acetaminophen (TYLENOL) tablet 975 mg, Q8H KRISS    dexamethasone (PF) (DECADRON) injection 6 mg, Q6H KRISS    levETIRAcetam (KEPPRA) injection 1,000 mg, Q12H KRISS    levothyroxine tablet 50 mcg, Early Morning    ondansetron (ZOFRAN) injection 4 mg, Q4H PRN    pantoprazole (PROTONIX) EC tablet 40 mg, Early Morning    senna-docusate sodium (SENOKOT S) 8.6-50 mg per tablet 2 tablet, BID    Facility-Administered Medications Ordered in Other Encounters:     fentaNYL injection, PRN    midazolam (VERSED) injection, PRN  VTE Pharmacologic Prophylaxis: Sequential compression device (Venodyne)  and Enoxaparin (Lovenox)  VTE Mechanical Prophylaxis: sequential compression device    Administrative Statements   I have spent a total time of 45 minutes in caring for this patient on the day of the visit/encounter including Diagnostic results, Prognosis, Patient and family education, Importance of tx compliance, Counseling / Coordination of care, Documenting in the medical record, Reviewing / ordering tests, medicine, procedures  , and Obtaining or reviewing history  .  Portions of the record may have been created with voice recognition software.

## 2024-10-22 NOTE — ASSESSMENT & PLAN NOTE
Palliative care following  Given GFR less than 60, as needed Toradol was discontinued today    Plan:  - Palliative following. Change Tylenol from every 8 hours to 650 mg every 6 hours  - Patient would like to avoid opioids and gabapentin if possible

## 2024-10-22 NOTE — CONSULTS
Consultation - Neurology   Name: Ayla Nye 73 y.o. 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  She has received chemotherapy and radiation therapy and has been maintained on chronic steroid therapy and Keppra prior to this admission. She presented on 10/20/24 with increased confusion and worsening aphasia. Her home Keppra was increased to 1000 mg BID from 750 mg BID and she received a load of Keppra initially in the ED upon presentation. She is currently on decadron 6 mg q6hr. On exam, she has a known right inferior temporal field deficit. She was AAO x3 and alert today, overall appearing improved compared to her initial presentation.     Recent imaging:  CT stroke alert brain 10/20:Stable vasogenic edema in the left parietal temporal region. Previously demonstrated left posterior parietal lesion is less well-visualized likely due to differences in technique.  CTA stroke alert head and neck 10/20:No hemodynamically significant stenosis, dissection or occlusion of the carotid or vertebral arteries or major vessels of the [place].  MRI brain seizure with and without contrast 10/21:Redemonstration of intracranial metastatic lesions. 2.3 cm enhancing lesion at the left occipital lobe is slightly increased in maximum dimension with increased central necrosis at the anterior margin that may be partly due to radiation necrosis. Additional smaller metastases are stable or mildly decreased in size. Stable left parieto-occipital edema with mass  effect on the left lateral ventricle. No new metastases. No acute infarct or hemorrhage    Impressions: In this patient with known brain metastases to the left occipital lobe with left parieto-occipital edema and mass effect in conjunction with episodes of transient reported flashing lights in her right eye followed by confusion in the setting of steroid weaning, there is a concern for seizure activity. Agree with the increase in Keppra from her home regimen. We will monitor her on video EEG for 24 hours to see if we can capture a spell. Continue decadron for now, will leave titration up to her primary oncology team.     Plan:  Continue Keppra 1000 mg BID   Continue video EEG monitoring for 24 hours  Continue Decadron 6 mg q 6 hr, will defer to her primary medical team with regards to titration/weaning. If she continues to have seizure-like activity, we can always re-evaluate her AED regimen to optimize  May need to discuss when to resume anti-platelet therapy. Although CTA did not show any signs of stenosis, concern for monocular visual loss or narrowing of orbital branch vessels  Continue frequent neuro checks  STAT CT head with any neurological decline  Malignant neoplasm of upper lobe, right bronchus or lung (HCC)  Per oncology and radiation oncology team, appreciate recommendations with regards to steroid taper  Neurology service will follow.  Recommendations for outpatient neurological follow up have yet to be determined.    History of Present Illness   Ayla Nye is a 73 y.o. female PMH NSC lung adenocarcinoma diagnosed March 2024 status post chemo/radiation therapy with mets to the brain on chronic steroid therapy and keppra, hyperlipidemia, cataracts, and hypertension who we are consulted for input regarding concern for seizure.  In July 2024, she was found to have brain metastases.  Patient reports that every time her steroid tapers are weaned, she develops neurological symptoms and will mostly  become more confused. She was admitted in September and found to have increased cerebellar vasogenic edema in addition to a new tiny temporal lobe lesion.      She presented to the ER on 10/16 with headache, dizziness, and elevated blood pressures for 1 day prior to presentation.  She denied any additional complaints such as visual disturbances, numbness, weakness, or confusion at that time.  She was taking 2 mg dexamethasone twice daily.  CT brain at that time redemonstrated parieto-occipital vasogenic edema. Exam was notable for field cut in the right lower quadrant of the right eye otherwise there were no deficits on exam.  She later presented on 10/20/24 with confusion. Stroke alert was called.  CT imaging notable for vasogenic edema at her baseline without progression.  In the ED, she had worsening symptoms and became more confused and aphasic with right upper extremity neglect.  She was given a load of Keppra 2000 mg and continued on her home dose of Keppra 750 mg twice daily.  There was a concern at that time for possible seizure-like activity.  She was transferred to [facility] for video EEG continuous monitoring.  She received 10 mg of Decadron in the ED.  MRI this admission showed redemonstration of intracranial metastatic lesions and no new metastases.  There was a 2.3 cm enhancing lesion of the left upper lobe slightly increased with increased central necrosis.  She is currently receiving 6 mg Decadron every 6 hours now in addition to Keppra 1000 mg twice daily.  Patient's daughter supported the statement that she has periods of worsening mental status with her home dose of Decadron is decreased.    Today she reported an episode of flashing lights in her right eye that occurred on 10/20.  There were 2 additional episodes over the span of this past month. The first episode occurred when she was doing yard work, she noted flashing lights and it subsided after 2 to 3 minutes.  More recently, the flashing  lights occurred when she was also experiencing confusion this past Saturday.  She reports seeing an ophthalmologist outpatient who did not find anything remarkable from his evaluation.  She then saw neurology outpatient on 9/17 for concern of focal aware seizures with this flashing in her right visual field and disorientation. She was continued on keppra 750 mg BID at the time.    Review of Systems   Neurological:  Positive for speech difficulty. Negative for dizziness, tremors, seizures, syncope, weakness, light-headedness, numbness and headaches.        I have reviewed the patient's PMH, PSH, Social History, Family History, Meds, and Allergies    Objective :  Temp:  [97.5 °F (36.4 °C)-98 °F (36.7 °C)] 98 °F (36.7 °C)  HR:  [60-72] 66  BP: (145-160)/(70-81) 145/77  Resp:  [15-21] 16  SpO2:  [95 %-99 %] 99 %  O2 Device: None (Room air)    Physical Exam  Constitutional:       General: She is awake.   Eyes:      General: Lids are normal.      Extraocular Movements: Extraocular movements intact.      Pupils: Pupils are equal, round, and reactive to light.   Neurological:      Mental Status: She is alert.      Motor: Motor strength is normal.     Deep Tendon Reflexes:      Reflex Scores:       Bicep reflexes are 2+ on the right side and 2+ on the left side.       Brachioradialis reflexes are 2+ on the right side and 2+ on the left side.       Patellar reflexes are 2+ on the right side and 2+ on the left side.       Achilles reflexes are 1+ on the right side and 1+ on the left side.    Neurological Exam  Mental Status  Awake and alert. Oriented to person, place and time. Recalls 3 of 3 objects immediately. Able to name objects. Attention and concentration are normal.    Cranial Nerves  CN II: Visual acuity is normal. Right inferior quadrantanopsia.  CN III, IV, VI: Extraocular movements intact bilaterally. Normal lids and orbits bilaterally. Pupils equal round and reactive to light bilaterally.  CN V: Facial sensation is  normal.  CN VII: Full and symmetric facial movement.  CN VIII: Hearing is normal.  CN IX, X: Palate elevates symmetrically. Normal gag reflex.  CN XI: Shoulder shrug strength is normal.  CN XII: Tongue midline without atrophy or fasciculations.  Right temporal inferior visual deficit .    Motor   No abnormal involuntary movements. Strength is 5/5 throughout all four extremities.    Sensory  Light touch is normal in upper and lower extremities.     Reflexes                                            Right                      Left  Brachioradialis                    2+                         2+  Biceps                                 2+                         2+  Patellar                                2+                         2+  Achilles                                1+                         1+    Right pathological reflexes: Ankle clonus absent.  Left pathological reflexes: Ankle clonus absent.    Coordination  Right: Finger-to-nose normal.Left: Finger-to-nose normal.        Lab Results: I have reviewed the following results:CBC:   Results from last 7 days   Lab Units 10/22/24  0520 10/21/24  0448 10/21/24  0038 10/20/24  1830   WBC Thousand/uL 4.67 3.04*  --  3.15*   RBC Million/uL 2.87* 2.87*  --  2.73*   HEMOGLOBIN g/dL 9.3* 9.5*  --  8.9*   HEMATOCRIT % 28.5* 29.0*  --  27.7*   MCV fL 99* 101*  --  102*   PLATELETS Thousands/uL 250 178 170 148*   , BMP/CMP:   Results from last 7 days   Lab Units 10/22/24  0520 10/21/24  0448 10/20/24  1830   SODIUM mmol/L 140 140 140   POTASSIUM mmol/L 4.1 4.5 3.8   CHLORIDE mmol/L 103 102 106   CO2 mmol/L 27 28 27   BUN mg/dL 25 12 17   CREATININE mg/dL 1.00 0.81 0.93   CALCIUM mg/dL 9.1 9.4 9.0   AST U/L  --  12* 12*   ALT U/L  --  12 12   ALK PHOS U/L  --  47 45   EGFR ml/min/1.73sq m 56 72 61     Recent Labs     10/21/24  0448 10/22/24  0520   WBC 3.04* 4.67   HGB 9.5* 9.3*   HCT 29.0* 28.5*    250   BANDSPCT  --  1   SODIUM 140 140   K 4.5 4.1    103    CO2 28 27   BUN 12 25   CREATININE 0.81 1.00   GLUC 153* 142*   CAIONIZED 1.21  --    MG 2.7  --    PHOS 3.9  --      Imaging Results Review: I reviewed radiology reports from this admission including: CT head and MRI brain.      VTE Prophylaxis: Enoxaparin (Lovenox)

## 2024-10-22 NOTE — ASSESSMENT & PLAN NOTE
Patient has a history of NSC lung adenocarcinoma with metastases to the brain.  She has received chemotherapy and radiation therapy and has been maintained on chronic steroid therapy and Keppra prior to this admission. She presented on 10/20/24 with increased confusion and worsening aphasia. Her home Keppra was increased to 1000 mg BID from 750 mg BID and she received a load of Keppra initially in the ED upon presentation. She is currently on decadron 6 mg q6hr. On exam, she has a known right inferior temporal field deficit. She was AAO x3 and alert today, overall appearing improved compared to her initial presentation.     Recent imaging:  CT stroke alert brain 10/20:Stable vasogenic edema in the left parietal temporal region. Previously demonstrated left posterior parietal lesion is less well-visualized likely due to differences in technique.  CTA stroke alert head and neck 10/20:No hemodynamically significant stenosis, dissection or occlusion of the carotid or vertebral arteries or major vessels of the [place].  MRI brain seizure with and without contrast 10/21:Redemonstration of intracranial metastatic lesions. 2.3 cm enhancing lesion at the left occipital lobe is slightly increased in maximum dimension with increased central necrosis at the anterior margin that may be partly due to radiation necrosis. Additional smaller metastases are stable or mildly decreased in size. Stable left parieto-occipital edema with mass effect on the left lateral ventricle. No new metastases. No acute infarct or hemorrhage    Impressions: In this patient with known brain metastases to the left occipital lobe with left parieto-occipital edema and mass effect in conjunction with episodes of transient reported flashing lights in her right eye followed by confusion in the setting of steroid weaning, there is a concern for seizure activity. Agree with the increase in Keppra from her home regimen. We will monitor her on video EEG  for 24 hours to see if we can capture a spell. Continue decadron for now, will leave titration up to her primary oncology team.     Plan:  Continue Keppra 1000 mg BID   Continue video EEG monitoring for 24 hours  Continue Decadron 6 mg q 6 hr, will defer to her primary medical team with regards to titration/weaning. If she continues to have seizure-like activity, we can always re-evaluate her AED regimen to optimize  May need to discuss when to resume anti-platelet therapy. Although CTA did not show any signs of stenosis, concern for monocular visual loss or narrowing of orbital branch vessels  Continue frequent neuro checks  STAT CT head with any neurological decline

## 2024-10-22 NOTE — PROGRESS NOTES
Progress Note - Neurosurgery   Name: Ayla Nye 73 y.o. female I MRN: [id number]  [facility] Date of Admission: 10/21/2024   Date of Service: 10/22/2024 I Hospital Day: 1    Assessment & Plan  Metastatic cancer to brain (HCC)  Known brain metastasis  History of NSC lung adenocarcinoma diagnosed in March 2024, status post chemo/RT.  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  Also diagnosed with mets to brain in July 2024, recommended whole brain radiation.  Ultimately she underwent SRS on 8/8/2024 to 5 enhancing lesions.  In the past attempted to taper steroids which led to neurological symptoms  Was admitted in September 2024 with AMS found to have increased cerebellar vasogenic edema after recent steroid taper.  Per chart review concern for possible leptomeningeal mets, underwent an LP which was negative for malignancy.  Also noted to have new tiny temporal lobe lesion.  As an outpatient she was on a Decadron taper per radiation oncology.  Patient's daughter states yesterday she was very confused and brought into the ED when she had difficulty speaking.  Of note she underwent MRI brain on 10/8/2024 which showed slight increase in size of left temporal lobe lesion with unchanged in 3 lesions and decreased size into other lesions with persistent diffuse vasogenic edema on left.    Imaging reviewed with Dr. Cross:  MRI brain seizures with without 10/21/24: Demonstration of intracranial metastatic lesions.  2.3 cm enhancing lesion at the left occipital lobe with slightly increase in maximum dimension with increased central necrosis at the anterior margin that may be partially related to radiation necrosis.  Smaller metastases are stable or mildly decreased in size.  Stable left parietal subdural edema and mass effect of the left lateral ventricle.  No new metastasis.  No acute infarct or hemorrhage.  CT head wo 10/20/24: Stable vasogenic edema  in the left parietal temporal region. Previously demonstrated left posterior parietal lesion is less well-visualized likely due to differences in technique.       Plan:  Case and imaging reviewed at morning rounds with Dr. Cross.  At this time would recommend ongoing management with medical and radiation oncology.  No neurosurgical intervention recommended at this juncture.  Continue frequent neurological checks.   Reviewed MRI results with patient  STAT CT head with any neurological decline including drop GCS of 2pts within 1 hr.  Recommend SBP <160mmHg.  On Decadron 6 mg every 6 hours -defer weaning to medical or radiation oncology  Recommend CT chest abdomen pelvis to assess for possible other underlying new lesions -ordered and pending  Neurology following, input appreciated  Patient's home Keppra dose was increased to 1000 mg twice daily for concern for seizure  vEEG in process  Medical management pain control per primary team  Per NCC BCs x 2 pending, WBCs 3.04.  Platelets 178  Mobilize with PT/OT  DVT PPX: SCDs.  Lovenox    Neurosurgery will sign off.  Call with any further question or concerns.     Cancer associated pain  Per palliative care        Subjective   Patient sitting up in bed.  NAD.  EEG leads in place.    Patient endorses she remains confused and difficulty with timing of events.  She denies any headaches.  Stable ongoing right visual field deficits.  Denies any speech difficulties at this time.  Denies any weakness or numbness.  Does endorse significant improvement since admission and reinitiation of steroids.    Objective :  Temp:  [97.5 °F (36.4 °C)-98 °F (36.7 °C)] 98 °F (36.7 °C)  HR:  [60-72] 66  BP: (145-160)/(70-81) 145/77  Resp:  [15-21] 16  SpO2:  [95 %-99 %] 99 %  O2 Device: None (Room air)    I/O         10/20 0701  10/21 0700 10/21 0701  10/22 0700 10/22 0701  10/23 0700    I.V. (mL/kg) 146.3 442.5 (8.5)     IV Piggyback  50     Total Intake(mL/kg) 146.3 492.5 (9.4)     Urine  (mL/kg/hr) 650 1165 (0.9)     Total Output 650 1165     Net -503.8 -672.5            Unmeasured Urine Occurrence  1 x           Physical Exam Neurological Exam    General appearance: alert, appears stated age, cooperative and no distress  Head: Normocephalic, without obvious abnormality, EEG leads in place  Eyes: EOMI, PERRL, right visual field deficits  Neck: supple, symmetrical, trachea midline   Lungs: non labored breathing  Heart: regular heart rate  Neurologic:   Mental status: Alert, oriented, thought content appropriate, accurate calculations  Cranial nerves: grossly intact (Cranial nerves II-XII)  Sensory: normal to crude touch x 4  Motor: moving all extremities with good strength.  Initial testing noted subtle left arm weakness but improved with repeat testing.  Coordination: no drift bilaterally       Lab Results: I have reviewed the following results:  Recent Labs     10/20/24  1830 10/20/24  2030 10/21/24  0038 10/21/24  0448 10/22/24  0520   WBC 3.15*  --   --  3.04* 4.67   HGB 8.9*  --   --  9.5* 9.3*   HCT 27.7*  --   --  29.0* 28.5*   *  --    < > 178 250   BANDSPCT  --   --   --   --  1   SODIUM 140  --   --  140 140   K 3.8  --   --  4.5 4.1     --   --  102 103   CO2 27  --   --  28 27   BUN 17  --   --  12 25   CREATININE 0.93  --   --  0.81 1.00   GLUC 151*  --   --  153* 142*   CAIONIZED  --   --   --  1.21  --    MG 1.9  --   --  2.7  --    PHOS  --   --   --  3.9  --    AST 12*  --   --  12*  --    ALT 12  --   --  12  --    ALB 3.8  --   --  3.9  --    TBILI 0.25  --   --  0.27  --    ALKPHOS 45  --   --  47  --    PTT 29  --   --   --   --    INR 0.94  --   --   --   --    HSTNI0 3  --   --   --   --    HSTNI2  --  4  --   --   --     < > = values in this interval not displayed.       Imaging Results Review: I personally reviewed the following image studies in PACS and associated radiology reports: MRI brain. My interpretation of the radiology images/reports is: As  above.      VTE Pharmacologic Prophylaxis: Sequential compression device (Venodyne)  and Enoxaparin (Lovenox)

## 2024-10-22 NOTE — ASSESSMENT & PLAN NOTE
Per oncology and radiation oncology team, appreciate recommendations with regards to steroid taper

## 2024-10-22 NOTE — ASSESSMENT & PLAN NOTE
Current regimen:  Change Tylenol to 650 mg q6H scheduled  D/C Toradol  Failed therapies:  Patient requesting no opioids, no gabapentin  Total OME over last 24 hours: NA  Bowel regimen to prevent OIC  Opioid agreement reviewed and signed. NA

## 2024-10-22 NOTE — PROGRESS NOTES
Progress Note - Palliative Care   Name: Ayla Nye 73 y.o. female I MRN: [id number]  [facility] Date of Admission: 10/21/2024   Date of Service: 10/22/2024 I Hospital Day: 1    Assessment & Plan  Metastatic cancer to brain (HCC)  -Patient currently on video EEG  -Continue steroids  Hypertension    Mixed hyperlipidemia    Malignant neoplasm of upper lobe, right bronchus or lung (HCC)    Hypothyroidism    Palliative care patient  Palliative Diagnosis: Metastatic lung cancer with brain metastases    Goals:   Continue full cares  Palliative will follow for ongoing goals of care discussions as situation evolves.    Social Support:  Supportive listening provided  Normalized experience of patient  Provided anxiety containment  Advocated for patient/family with interdisciplinary team  Mediated conflict    Care Coordination  Case discussed with primary    Follow-up  We appreciate the opportunity to participate in this patient's care.   We will continue to follow while admitted.    Please do not hesitate to contact our on-call provider through [facility] Secure Chat or contact [phone] should there be an acute change or other symptom control concerns.    Acute encephalopathy    Cancer associated pain  Current regimen:  Change Tylenol to 650 mg q6H scheduled  D/C Toradol  Failed therapies:  Patient requesting no opioids, no gabapentin  Total OME over last 24 hours: NA  Bowel regimen to prevent OIC  Opioid agreement reviewed and signed. NA      Decisional apparatus: Patient is competent on my exam today. If competence is lost, patient's substitute decision maker would default to daughter by [place].   Advance Directive / Living Will / POLST: yes     PDMP Review: I have reviewed the patient's controlled substance dispensing history in the Prescription Drug Monitoring Program in compliance with the [place] regulations before prescribing any controlled substances.    Subjective   Overall, patient is doing well.   She is requesting her Tylenol be changed to every 6 hours as she feels that is when the pain starts to return.  Otherwise she feels the Tylenol is managing her pain sufficiently.    Objective :  Temp:  [97.5 °F (36.4 °C)-97.9 °F (36.6 °C)] 97.9 °F (36.6 °C)  HR:  [60-76] 72  BP: (134-160)/(70-84) 160/70  Resp:  [15-21] 15  SpO2:  [95 %-99 %] 99 %  O2 Device: None (Room air)    Physical Exam  Vitals and nursing note reviewed.   Constitutional:       General: She is not in acute distress.  HENT:      Head: Normocephalic and atraumatic.      Right Ear: External ear normal.      Left Ear: External ear normal.   Eyes:      General:         Right eye: No discharge.         Left eye: No discharge.   Cardiovascular:      Rate and Rhythm: Normal rate.   Pulmonary:      Effort: Pulmonary effort is normal. No respiratory distress.   Abdominal:      General: There is distension.      Palpations: Abdomen is soft.      Tenderness: There is abdominal tenderness.   Skin:     Coloration: Skin is pale.   Neurological:      Mental Status: Mental status is at baseline.      Cranial Nerves: No cranial nerve deficit.   Psychiatric:         Mood and Affect: Mood normal.            Lab Results: I have reviewed the following results:  Lab Results   Component Value Date/Time    SODIUM 140 10/22/2024 05:20 AM    K 4.1 10/22/2024 05:20 AM    BUN 25 10/22/2024 05:20 AM    CREATININE 1.00 10/22/2024 05:20 AM    GLUC 142 (H) 10/22/2024 05:20 AM    CALCIUM 9.1 10/22/2024 05:20 AM    AST 12 (L) 10/21/2024 04:48 AM    ALT 12 10/21/2024 04:48 AM    ALB 3.9 10/21/2024 04:48 AM    TP 7.1 10/21/2024 04:48 AM    EGFR 56 10/22/2024 05:20 AM     Lab Results   Component Value Date/Time    HGB 9.3 (L) 10/22/2024 05:20 AM    WBC 4.67 10/22/2024 05:20 AM     10/22/2024 05:20 AM    INR 0.94 10/20/2024 06:30 PM    PTT 29 10/20/2024 06:30 PM     Lab Results   Component Value Date/Time    [id number] 0.541 10/21/2024 04:48 AM       Code Status: Level 1 -  Full Code  Advance Directive and Living Will:      Power of : Yes  POLST:      Administrative Statements   I have spent a total time of 30 minutes in caring for this patient on the day of the visit/encounter including Risks and benefits of tx options, Instructions for management, Patient and family education, Importance of tx compliance, Risk factor reductions, Impressions, Counseling / Coordination of care, Documenting in the medical record, Reviewing / ordering tests, medicine, procedures  , and Obtaining or reviewing history  . Topics discussed with the patient / family include symptom assessment and management, medication review, medication adjustment, opioid titration, and supportive listening.

## 2024-10-22 NOTE — ASSESSMENT & PLAN NOTE
Palliative Diagnosis: Metastatic lung cancer with brain metastases    Goals:   Continue full cares  Palliative will follow for ongoing goals of care discussions as situation evolves.    Social Support:  Supportive listening provided  Normalized experience of patient  Provided anxiety containment  Advocated for patient/family with interdisciplinary team  Mediated conflict    Care Coordination  Case discussed with primary    Follow-up  We appreciate the opportunity to participate in this patient's care.   We will continue to follow while admitted.    Please do not hesitate to contact our on-call provider through [employer] Secure Chat or contact [phone] should there be an acute change or other symptom control concerns.

## 2024-10-23 ENCOUNTER — TELEPHONE (OUTPATIENT)
Age: 74
End: 2024-10-23

## 2024-10-23 ENCOUNTER — APPOINTMENT (OUTPATIENT)
Dept: RADIATION ONCOLOGY | Facility: HOSPITAL | Age: 74
End: 2024-10-23
Attending: RADIOLOGY
Payer: MEDICARE

## 2024-10-23 LAB
ANION GAP SERPL CALCULATED.3IONS-SCNC: 10 MMOL/L (ref 4–13)
BUN SERPL-MCNC: 22 MG/DL (ref 5–25)
CALCIUM SERPL-MCNC: 9 MG/DL (ref 8.4–10.2)
CHLORIDE SERPL-SCNC: 105 MMOL/L (ref 96–108)
CO2 SERPL-SCNC: 26 MMOL/L (ref 21–32)
CREAT SERPL-MCNC: 0.92 MG/DL (ref 0.6–1.3)
ERYTHROCYTE [DISTWIDTH] IN BLOOD BY AUTOMATED COUNT: 13.6 % (ref 11.6–15.1)
GFR SERPL CREATININE-BSD FRML MDRD: 61 ML/MIN/1.73SQ M
GLUCOSE SERPL-MCNC: 137 MG/DL (ref 65–140)
HCT VFR BLD AUTO: 28.8 % (ref 34.8–46.1)
HGB BLD-MCNC: 9.5 G/DL (ref 11.5–15.4)
MCH RBC QN AUTO: 32.8 PG (ref 26.8–34.3)
MCHC RBC AUTO-ENTMCNC: 33 G/DL (ref 31.4–37.4)
MCV RBC AUTO: 99 FL (ref 82–98)
PLATELET # BLD AUTO: 262 THOUSANDS/UL (ref 149–390)
PMV BLD AUTO: 8.5 FL (ref 8.9–12.7)
POTASSIUM SERPL-SCNC: 4.3 MMOL/L (ref 3.5–5.3)
RBC # BLD AUTO: 2.9 MILLION/UL (ref 3.81–5.12)
SODIUM SERPL-SCNC: 141 MMOL/L (ref 135–147)
WBC # BLD AUTO: 5.09 THOUSAND/UL (ref 4.31–10.16)

## 2024-10-23 PROCEDURE — 97535 SELF CARE MNGMENT TRAINING: CPT

## 2024-10-23 PROCEDURE — 99232 SBSQ HOSP IP/OBS MODERATE 35: CPT | Performed by: INTERNAL MEDICINE

## 2024-10-23 PROCEDURE — 80048 BASIC METABOLIC PNL TOTAL CA: CPT

## 2024-10-23 PROCEDURE — 99233 SBSQ HOSP IP/OBS HIGH 50: CPT | Performed by: PSYCHIATRY & NEUROLOGY

## 2024-10-23 PROCEDURE — 97129 THER IVNTJ 1ST 15 MIN: CPT

## 2024-10-23 PROCEDURE — 95720 EEG PHY/QHP EA INCR W/VEEG: CPT | Performed by: PSYCHIATRY & NEUROLOGY

## 2024-10-23 PROCEDURE — 85027 COMPLETE CBC AUTOMATED: CPT

## 2024-10-23 RX ORDER — DEXAMETHASONE 2 MG/1
2 TABLET ORAL DAILY
Status: DISCONTINUED | OUTPATIENT
Start: 2024-11-02 | End: 2024-10-25

## 2024-10-23 RX ORDER — DEXAMETHASONE 4 MG/1
4 TABLET ORAL EVERY 6 HOURS SCHEDULED
Status: DISCONTINUED | OUTPATIENT
Start: 2024-10-24 | End: 2024-10-25

## 2024-10-23 RX ORDER — DEXAMETHASONE 2 MG/1
2 TABLET ORAL EVERY 12 HOURS SCHEDULED
Status: DISCONTINUED | OUTPATIENT
Start: 2024-10-30 | End: 2024-10-25

## 2024-10-23 RX ORDER — DEXAMETHASONE 4 MG/1
4 TABLET ORAL EVERY 12 HOURS SCHEDULED
Status: DISCONTINUED | OUTPATIENT
Start: 2024-10-28 | End: 2024-10-25

## 2024-10-23 RX ADMIN — ACETAMINOPHEN 650 MG: 325 TABLET, FILM COATED ORAL at 17:02

## 2024-10-23 RX ADMIN — PANTOPRAZOLE SODIUM 40 MG: 40 TABLET, DELAYED RELEASE ORAL at 06:25

## 2024-10-23 RX ADMIN — LEVETIRACETAM 1000 MG: 100 INJECTION, SOLUTION INTRAVENOUS at 20:27

## 2024-10-23 RX ADMIN — DEXAMETHASONE SODIUM PHOSPHATE 6 MG: 10 INJECTION INTRAMUSCULAR; INTRAVENOUS at 06:26

## 2024-10-23 RX ADMIN — DEXAMETHASONE 4 MG: 4 TABLET ORAL at 23:56

## 2024-10-23 RX ADMIN — ACETAMINOPHEN 650 MG: 325 TABLET, FILM COATED ORAL at 12:27

## 2024-10-23 RX ADMIN — LEVOTHYROXINE SODIUM 50 MCG: 50 TABLET ORAL at 06:25

## 2024-10-23 RX ADMIN — SENNOSIDES AND DOCUSATE SODIUM 2 TABLET: 50; 8.6 TABLET ORAL at 17:02

## 2024-10-23 RX ADMIN — DEXAMETHASONE SODIUM PHOSPHATE 6 MG: 10 INJECTION INTRAMUSCULAR; INTRAVENOUS at 12:27

## 2024-10-23 RX ADMIN — Medication 6 MG: at 20:42

## 2024-10-23 RX ADMIN — AMLODIPINE BESYLATE 5 MG: 5 TABLET ORAL at 08:51

## 2024-10-23 RX ADMIN — LEVETIRACETAM 1000 MG: 100 INJECTION, SOLUTION INTRAVENOUS at 08:50

## 2024-10-23 RX ADMIN — SENNOSIDES AND DOCUSATE SODIUM 2 TABLET: 50; 8.6 TABLET ORAL at 08:51

## 2024-10-23 RX ADMIN — DEXAMETHASONE SODIUM PHOSPHATE 6 MG: 10 INJECTION INTRAMUSCULAR; INTRAVENOUS at 17:01

## 2024-10-23 RX ADMIN — ACETAMINOPHEN 650 MG: 325 TABLET, FILM COATED ORAL at 06:25

## 2024-10-23 RX ADMIN — ATORVASTATIN CALCIUM 20 MG: 20 TABLET, FILM COATED ORAL at 17:02

## 2024-10-23 RX ADMIN — ACETAMINOPHEN 650 MG: 325 TABLET, FILM COATED ORAL at 23:56

## 2024-10-23 NOTE — PROGRESS NOTES
Progress Note - Palliative Care   Name: Ayla Nye 73 y.o. female I MRN: [id number]  Unit/Bed#: [facility] 717-01 I Date of Admission: 10/21/2024   Date of Service: 10/23/2024 I Hospital Day: 2     Assessment & Plan  Metastatic cancer to brain (HCC)  Continue full-cares, including cancer-directed therapy:  Discussed case with Radiation Oncology. Recommend continuation of Dexamethasone 2 mg Daily (Without Taper), on hospital discharge, to avoid re-hospitalization.  Recommend outpatient follow-up with Medical Oncology, and Radiation Oncology, as scheduled.    Hypertension    Mixed hyperlipidemia    Malignant neoplasm of upper lobe, right bronchus or lung (HCC)    Hypothyroidism    Palliative care patient  Palliative Diagnosis: Recurrent metastatic non-small cell lung cancer with intracranial metastasis.    Goals:   Continue full-cares, including cancer-directed therapy.  Palliative Care will continue to follow for symptom-management.    Social Support:  Supportive listening provided.  Normalized experience of patient.  Provided anxiety containment.  Advocated for patient/family with interdisciplinary team.  Mediated conflict.    Care Coordination:  Case discussed with patient, and Radiation Oncology.    Follow-Up:  Palliative Care to continue to follow for symptom-management.  Please do not hesitate to contact our on-call provider through [facility] Secure Chat or contact [phone] should there be an acute change or other symptom control concerns.    Acute encephalopathy  Resolving.  Cancer associated pain  Continue current pain-regimen:  Continue Acetaminophen 650 mg q6HR (Scheduled).  Note: Ketorolac was discontinued. Consider re-initiation of non-steroidal anti-inflammatory agent for severe-pain refractory to Acetaminophen.  Failed-therapies:  Patient requesting avoidance of Opiates, as well as reinitiation of Gabapentin.    Subjective:  Interval History: Patient was seen and examined on the General Medical floor.  Resting comfortably in bed, on my initial arrival. No acute events noted, overnight. Patient endorses improved pain-control with adjustment of Acetaminophen. Intermittent exacerbations of pain noted, where it becomes severe; however, it is otherwise manageable. She has no further concerns or complaints at the present time.    Review of Systems:  All systems reviewed and negative except otherwise listed in the History of Present Illness.    Objective:  Vitals:    10/23/24 1547   BP: 145/88   Pulse: 64   Resp: 18   Temp: 97.9 °F (36.6 °C)   SpO2: 96%     Physical Exam:   General: Alert, and oriented; Pleasant, and conversational; Overall, Well-Appearing  HEENT: Atraumatic, and normocephalic; PERRLA; EOMI; Moist mucosal membranes  Respiratory: Normal work of breathing; No supplemental-oxygen requirement  Extremities: No obvious deformities; No peripheral edema; Moves all  Neurologic: Appropriately alert, and oriented to Person, Place, and Time    WBC   Date Value Ref Range Status   10/23/2024 5.09 4.31 - 10.16 Thousand/uL Final   10/22/2024 4.67 4.31 - 10.16 Thousand/uL Final   10/21/2024 3.04 (L) 4.31 - 10.16 Thousand/uL Final     Hemoglobin   Date Value Ref Range Status   10/23/2024 9.5 (L) 11.5 - 15.4 g/dL Final   10/22/2024 9.3 (L) 11.5 - 15.4 g/dL Final   10/21/2024 9.5 (L) 11.5 - 15.4 g/dL Final     Platelets   Date Value Ref Range Status   10/23/2024 262 149 - 390 Thousands/uL Final   10/22/2024 250 149 - 390 Thousands/uL Final   10/21/2024 178 149 - 390 Thousands/uL Final     MCV   Date Value Ref Range Status   10/23/2024 99 (H) 82 - 98 fL Final   10/22/2024 99 (H) 82 - 98 fL Final   10/21/2024 101 (H) 82 - 98 fL Final      Potassium   Date Value Ref Range Status   10/23/2024 4.3 3.5 - 5.3 mmol/L Final   10/22/2024 4.1 3.5 - 5.3 mmol/L Final   10/21/2024 4.5 3.5 - 5.3 mmol/L Final     Chloride   Date Value Ref Range Status   10/23/2024 105 96 - 108 mmol/L Final   10/22/2024 103 96 - 108 mmol/L Final    10/21/2024 102 96 - 108 mmol/L Final     CO2   Date Value Ref Range Status   10/23/2024 26 21 - 32 mmol/L Final   10/22/2024 27 21 - 32 mmol/L Final   10/21/2024 28 21 - 32 mmol/L Final     BUN   Date Value Ref Range Status   10/23/2024 22 5 - 25 mg/dL Final   10/22/2024 25 5 - 25 mg/dL Final   10/21/2024 12 5 - 25 mg/dL Final     Creatinine   Date Value Ref Range Status   10/23/2024 0.92 0.60 - 1.30 mg/dL Final     Comment:     Standardized to IDMS reference method   10/22/2024 1.00 0.60 - 1.30 mg/dL Final     Comment:     Standardized to IDMS reference method   10/21/2024 0.81 0.60 - 1.30 mg/dL Final     Comment:     Standardized to IDMS reference method     Glucose   Date Value Ref Range Status   10/23/2024 137 65 - 140 mg/dL Final     Comment:     If the patient is fasting, the ADA then defines impaired fasting glucose as > 100 mg/dL and diabetes as > or equal to 123 mg/dL.   10/22/2024 142 (H) 65 - 140 mg/dL Final     Comment:     If the patient is fasting, the ADA then defines impaired fasting glucose as > 100 mg/dL and diabetes as > or equal to 123 mg/dL.   10/21/2024 153 (H) 65 - 140 mg/dL Final     Comment:     If the patient is fasting, the ADA then defines impaired fasting glucose as > 100 mg/dL and diabetes as > or equal to 123 mg/dL.     Calcium   Date Value Ref Range Status   10/23/2024 9.0 8.4 - 10.2 mg/dL Final   10/22/2024 9.1 8.4 - 10.2 mg/dL Final   10/21/2024 9.4 8.4 - 10.2 mg/dL Final     Albumin   Date Value Ref Range Status   10/21/2024 3.9 3.5 - 5.0 g/dL Final   10/20/2024 3.8 3.5 - 5.0 g/dL Final   10/03/2024 3.7 3.5 - 5.0 g/dL Final     Total Bilirubin   Date Value Ref Range Status   10/21/2024 0.27 0.20 - 1.00 mg/dL Final     Comment:     Use of this assay is not recommended for patients undergoing treatment with eltrombopag due to the potential for falsely elevated results.  N-acetyl-p-benzoquinone imine (metabolite of Acetaminophen) will generate erroneously low results in samples for  "patients that have taken an overdose of Acetaminophen.   10/20/2024 0.25 0.20 - 1.00 mg/dL Final     Comment:     Use of this assay is not recommended for patients undergoing treatment with eltrombopag due to the potential for falsely elevated results.  N-acetyl-p-benzoquinone imine (metabolite of Acetaminophen) will generate erroneously low results in samples for patients that have taken an overdose of Acetaminophen.   10/03/2024 0.30 0.20 - 1.00 mg/dL Final     Comment:     Use of this assay is not recommended for patients undergoing treatment with eltrombopag due to the potential for falsely elevated results.  N-acetyl-p-benzoquinone imine (metabolite of Acetaminophen) will generate erroneously low results in samples for patients that have taken an overdose of Acetaminophen.     Alkaline Phosphatase   Date Value Ref Range Status   10/21/2024 47 34 - 104 U/L Final   10/20/2024 45 34 - 104 U/L Final   10/03/2024 34 34 - 104 U/L Final     AST   Date Value Ref Range Status   10/21/2024 12 (L) 13 - 39 U/L Final   10/20/2024 12 (L) 13 - 39 U/L Final   10/03/2024 12 (L) 13 - 39 U/L Final     ALT   Date Value Ref Range Status   10/21/2024 12 7 - 52 U/L Final     Comment:     Specimen collection should occur prior to Sulfasalazine administration due to the potential for falsely depressed results.    10/20/2024 12 7 - 52 U/L Final     Comment:     Specimen collection should occur prior to Sulfasalazine administration due to the potential for falsely depressed results.    10/03/2024 12 7 - 52 U/L Final     Comment:     Specimen collection should occur prior to Sulfasalazine administration due to the potential for falsely depressed results.       LD   Date Value Ref Range Status   10/03/2024 461 (H) 140 - 271 U/L Final   09/13/2024 256 140 - 271 U/L Final   08/24/2024 207 140 - 271 U/L Final    No results found for: \"TSH\" No results found for: \"J9UBDMD\"   Free T4   Date Value Ref Range Status   09/13/2024 0.92 0.61 - 1.12 " ng/dL Final     Comment:     Specimens with biotin concentrations > 10 ng/mL can lead to significant (> 10%) positive bias in result.   08/24/2024 0.77 0.61 - 1.12 ng/dL Final     Comment:     Specimens with biotin concentrations > 10 ng/mL can lead to significant (> 10%) positive bias in result.      Patient was seen and discussed with attending physician, CARLOS Diaz D.O.  Hematology-Oncology Fellow (PGY-5)

## 2024-10-23 NOTE — ASSESSMENT & PLAN NOTE
Patient with a history of non-small cell lung cancer of the right upper lobe with metastasis to the brain diagnosed in March 2024.  She was on weekly carboplatin and paclitaxel with radiation from 5/23/2024 - 7/5/2024.  Further imaging after treatment showed signs concerning of recurrent metastatic NSCLC now with possible metastasis to the pelvis.  For this, it was recommended that she undergo systemic chemoimmunotherapy with carboplatin, pemetrexed, and pembrolizumab.  Her first cycle was on 10/7/2024.    Plan:  - Patient following closely with oncology in the outpatient setting  - Remainder of care for active concerns as mentioned above  - CT chest abdomen and pelvis revealed new small bilateral pulmonary nodules, likely metastasis, new right renal lesion(s) concerning for metastasis, significant interval enlargement of necrotic appearing mass in the right ischiorectal fossa, likely metastasis, deceasing RUL necrotic mass with stable and/or decreasing satellite nodularity, improved mediastinal and right hilar adenopathy, stable 2.4 cm CHUYITA groundglass density.  - Patient will f/u with heme-onc on 11/5/24

## 2024-10-23 NOTE — ASSESSMENT & PLAN NOTE
Documented history of hypertension but not on any antihypertensives at home.  Blood pressure is acceptable this admission  Patient and chart review, patient usually taking multipin 5 mg and losartan 50 mg daily before being discontinued in June 2024    BP overnight high 140s-150s/70s; elevated BP most likely due to steroids and some component of pain as well.     Plan:  - Continue amlodipine 5 mg today, may titrate up if BP continues to elevate

## 2024-10-23 NOTE — PLAN OF CARE
Problem: PAIN - ADULT  Goal: Verbalizes/displays adequate comfort level or baseline comfort level  Description: Interventions:  - Encourage patient to monitor pain and request assistance  - Assess pain using appropriate pain scale  - Administer analgesics based on type and severity of pain and evaluate response  - Implement non-pharmacological measures as appropriate and evaluate response  - Consider cultural and social influences on pain and pain management  - Notify physician/advanced practitioner if interventions unsuccessful or patient reports new pain  Outcome: Progressing     Problem: INFECTION - ADULT  Goal: Absence or prevention of progression during hospitalization  Description: INTERVENTIONS:  - Assess and monitor for signs and symptoms of infection  - Monitor lab/diagnostic results  - Monitor all insertion sites, i.e. indwelling lines, tubes, and drains  - Monitor endotracheal if appropriate and nasal secretions for changes in amount and color  - [place] appropriate cooling/warming therapies per order  - Administer medications as ordered  - Instruct and encourage patient and family to use good hand hygiene technique  - Identify and instruct in appropriate isolation precautions for identified infection/condition  Outcome: Progressing  Goal: Absence of fever/infection during neutropenic period  Description: INTERVENTIONS:  - Monitor WBC    Outcome: Progressing     Problem: SAFETY ADULT  Goal: Patient will remain free of falls  Description: INTERVENTIONS:  - Educate patient/family on patient safety including physical limitations  - Instruct patient to call for assistance with activity   - Consult OT/PT to assist with strengthening/mobility   - Keep Call bell within reach  - Keep bed low and locked with side rails adjusted as appropriate  - Keep care items and personal belongings within reach  - Initiate and maintain comfort rounds  - Make Fall Risk Sign visible to staff  - Offer Toileting every 2 Hours,  in advance of need  - Initiate/Maintain bed alarm  - Apply yellow socks and bracelet for high fall risk patients  - Consider moving patient to room near nurses station  Outcome: Progressing  Goal: Maintain or return to baseline ADL function  Description: INTERVENTIONS:  -  Assess patient's ability to carry out ADLs; assess patient's baseline for ADL function and identify physical deficits which impact ability to perform ADLs (bathing, care of mouth/teeth, toileting, grooming, dressing, etc.)  - Assess/evaluate cause of self-care deficits   - Assess range of motion  - Assess patient's mobility; develop plan if impaired  - Assess patient's need for assistive devices and provide as appropriate  - Encourage maximum independence but intervene and supervise when necessary  - Involve family in performance of ADLs  - Assess for home care needs following discharge   - Consider OT consult to assist with ADL evaluation and planning for discharge  - Provide patient education as appropriate  Outcome: Progressing  Goal: Maintains/Returns to pre admission functional level  Description: INTERVENTIONS:  - Perform AM-PAC 6 Click Basic Mobility/ Daily Activity assessment daily.  - Set and communicate daily mobility goal to care team and patient/family/caregiver.   - Collaborate with rehabilitation services on mobility goals if consulted  - Perform Range of Motion 3 times a day.  - Reposition patient every 2 hours.  - Dangle patient 3 times a day  - Stand patient 2 times a day  - Ambulate patient 3 times a day  - Out of bed to chair 2 times a day   - Out of bed for meals 3 times a day  - Out of bed for toileting  - Record patient progress and toleration of activity level   Outcome: Progressing     Problem: DISCHARGE PLANNING  Goal: Discharge to home or other facility with appropriate resources  Description: INTERVENTIONS:  - Identify barriers to discharge w/patient and caregiver  - Arrange for needed discharge resources and  transportation as appropriate  - Identify discharge learning needs (meds, wound care, etc.)  - Arrange for interpretive services to assist at discharge as needed  - Refer to Case Management Department for coordinating discharge planning if the patient needs post-hospital services based on physician/advanced practitioner order or complex needs related to functional status, cognitive ability, or social support system  Outcome: Progressing     Problem: Knowledge Deficit  Goal: Patient/family/caregiver demonstrates understanding of disease process, treatment plan, medications, and discharge instructions  Description: Complete learning assessment and assess knowledge base.  Interventions:  - Provide teaching at level of understanding  - Provide teaching via preferred learning methods  Outcome: Progressing

## 2024-10-23 NOTE — ASSESSMENT & PLAN NOTE
Patient with history of non-small cell lung adenocarcinoma diagnosed in March 2024.  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  She completed SRS for these metastatic lesions in August 2024.  She is on Decadron in the outpatient setting and has had multiple admissions due to episodes of altered mental status with increased vasogenic edema after attempting to taper Decadron dosing.  She presented to [facility] on 10/20 with worsening confusion and a stroke alert was called.  Imaging showed no acute findings.  She was loaded with Keppra, started on Decadron, and transferred to [place] for video EEG and MRI brain under neurocritical care for close monitoring.  The morning of 10/21 patient's mental status reportedly much improved and she was deemed stable for transfer out of the ICU.    Symptoms likely in the setting of Decadron taper    Of note, patient is not exactly sure what her current Decadron dosing is.  With that said, it appears that she had been on a Decadron taper throughout September starting at 8 mg daily that was reduced each week.  Her most recent orders show Decadron 1 mg daily from 10/17-10/24 and then 0.5 mg daily for 8 more days    vEEG update as of today: 26 hours of recording. No seizures. Continues to have left hemisphere slowing, left posterior quadrant maximal. There are also left posterior temporal / parietal poorly formed (blunt and at times sharply contoured) LPDs occurring at 0.5-1 Hz. LPDs indicate high seizure risk and are often associated with acute/subacute destructive neuronal injury. Right hemisphere is essentially normal.     Blood cultures no growth at 48 hours    Plan:  - MRI 10/21 show stable vasogenic edema in the left parietotemporal region; previously demonstrated left posterior parietal lesion as well visualized likely due to differences in technique.  No new metastasis, no new acute infarct or hemorrhage.  - Start VTE  prophylaxis with Lovenox  today  - Neurosurgery, neurology, and palliative care consulted.  Appreciate assistance  - Per neurosurgery, no emergent neurosurgical intervention warranted  - Per neurology, continue Keppra 100 mg BID , okay to discharge with this dosage.  Also recommended Plavix or aspirin given right monocular inferior temporal vision for impairment at high risk of developing CV A. Follow-up with epilepsy clinic in 4 to 6 weeks  - Monitor on video EEG  - Continue Decadron 6 mg every 6 hours. Transition to Decadron 4 mg every 6 hours starting tomorrow to 10/30 followed by 2 mg BID on 10/31 to 11/1, and 2 mg on 11/2 to 11/11. Patient should have enough doses until her heme-onc appointment on 11/5/24  - Continue monitoring glucose given on dexamethasone  - SBP goal <160  - Stat CT head with any neurological decline for return of altered mental status  - Neurochecks every 4 hours  - Seizure precautions in place  - PT/OT recommended level 1

## 2024-10-23 NOTE — PROGRESS NOTES
Progress Note - Internal Medicine   Name: Ayla Nye 73 y.o. female I MRN: [id number]  [facility] I Date of Admission: 10/21/2024   Date of Service: 10/23/2024 I Hospital Day: 2    Assessment & Plan  Metastatic cancer to brain (HCC)  Patient with history of non-small cell lung adenocarcinoma diagnosed in March 2024.  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  She completed SRS for these metastatic lesions in August 2024.  She is on Decadron in the outpatient setting and has had multiple admissions due to episodes of altered mental status with increased vasogenic edema after attempting to taper Decadron dosing.  She presented to [facility] on 10/20 with worsening confusion and a stroke alert was called.  Imaging showed no acute findings.  She was loaded with Keppra, started on Decadron, and transferred to [facility] for video EEG and MRI brain under neurocritical care for close monitoring.  The morning of 10/21 patient's mental status reportedly much improved and she was deemed stable for transfer out of the ICU.    Symptoms likely in the setting of Decadron taper    Of note, patient is not exactly sure what her current Decadron dosing is.  With that said, it appears that she had been on a Decadron taper throughout September starting at 8 mg daily that was reduced each week.  Her most recent orders show Decadron 1 mg daily from 10/17-10/24 and then 0.5 mg daily for 8 more days    vEEG update as of today: 26 hours of recording. No seizures. Continues to have left hemisphere slowing, left posterior quadrant maximal. There are also left posterior temporal / parietal poorly formed (blunt and at times sharply contoured) LPDs occurring at 0.5-1 Hz. LPDs indicate high seizure risk and are often associated with acute/subacute destructive neuronal injury. Right hemisphere is essentially normal.     Blood cultures no growth at 48 hours    Plan:  - MRI  10/21 show stable vasogenic edema in the left parietotemporal region; previously demonstrated left posterior parietal lesion as well visualized likely due to differences in technique.  No new metastasis, no new acute infarct or hemorrhage.  - Start VTE prophylaxis with Lovenox  today  - Neurosurgery, neurology, and palliative care consulted.  Appreciate assistance  - Per neurosurgery, no emergent neurosurgical intervention warranted  - Per neurology, continue Keppra 100 mg BID , okay to discharge with this dosage.  Also recommended Plavix or aspirin given right monocular inferior temporal vision for impairment at high risk of developing CV A. Follow-up with epilepsy clinic in 4 to 6 weeks  - Monitor on video EEG  - Continue Decadron 6 mg every 6 hours. Transition to Decadron 4 mg every 6 hours starting tomorrow to 10/30 followed by 2 mg BID on 10/31 to 11/1, and 2 mg on 11/2 to 11/11. Patient should have enough doses until her heme-onc appointment on 11/5/24  - Continue monitoring glucose given on dexamethasone  - SBP goal <160  - Stat CT head with any neurological decline for return of altered mental status  - Neurochecks every 4 hours  - Seizure precautions in place  - PT/OT recommended level 1   Hypertension  Documented history of hypertension but not on any antihypertensives at home.  Blood pressure is acceptable this admission  Patient and chart review, patient usually taking multipin 5 mg and losartan 50 mg daily before being discontinued in June 2024    BP overnight high 140s-150s/70s; elevated BP most likely due to steroids and some component of pain as well.     Plan:  - Continue amlodipine 5 mg today, may titrate up if BP continues to elevate   Mixed hyperlipidemia  Lipid panel 7/20/2024: , TG 76, HDL 60,   ASCVD of 18.4%.  Not on any statin therapy per chart review    Plan:  - Continue Lipitor 20 mg daily  Malignant neoplasm of upper lobe, right bronchus or lung (HCC)  Patient with a history  of non-small cell lung cancer of the right upper lobe with metastasis to the brain diagnosed in March 2024.  She was on weekly carboplatin and paclitaxel with radiation from 5/23/2024 - 7/5/2024.  Further imaging after treatment showed signs concerning of recurrent metastatic NSCLC now with possible metastasis to the pelvis.  For this, it was recommended that she undergo systemic chemoimmunotherapy with carboplatin, pemetrexed, and pembrolizumab.  Her first cycle was on 10/7/2024.    Plan:  - Patient following closely with oncology in the outpatient setting  - Remainder of care for active concerns as mentioned above  - CT chest abdomen and pelvis revealed new small bilateral pulmonary nodules, likely metastasis, new right renal lesion(s) concerning for metastasis, significant interval enlargement of necrotic appearing mass in the right ischiorectal fossa, likely metastasis, deceasing RUL necrotic mass with stable and/or decreasing satellite nodularity, improved mediastinal and right hilar adenopathy, stable 2.4 cm CHUYITA groundglass density.  - Patient will f/u with heme-onc on 11/5/24    Hypothyroidism  Continue PTA levothyroxine 50 mcg daily  Palliative care patient  Palliative care following  Given GFR less than 60, as needed Toradol was discontinued today    Plan:  - Palliative following. Change Tylenol from every 8 hours to 650 mg every 6 hours  - Patient would like to avoid opioids and gabapentin if possible    Disposition: Pending [facility]    Team: SOD TEAM C    Subjective   Patient seen and examined. No acute events overnight.  Patient states she is doing much better today. She is aware of her EEG that no seizure activity noted but at high risk for seizure. Denies headache, dizziness, visual changes, chest pain discomfort, abdominal pain, and urinary symptoms.    Objective :  Temp:  [97.7 °F (36.5 °C)-98 °F (36.7 °C)] 97.7 °F (36.5 °C)  HR:  [66] 66  BP: (145-155)/(70-77) 150/71  Resp:  [16-17] 17  SpO2:  [96 %-99  %] 96 %  O2 Device: None (Room air)    I/O         10/20 0701  10/21 0700 10/21 0701  10/22 0700    I.V. (mL/kg) 146.3 442.5 (8.5)    IV Piggyback  50    Total Intake(mL/kg) 146.3 492.5 (9.4)    Urine (mL/kg/hr) 650 1165 (0.9)    Total Output 650 1165    Net -503.8 -672.5          Unmeasured Urine Occurrence  1 x          Weights:        Body mass index is 20.32 kg/m².  Weight (last 2 days)       Date/Time Weight    10/23/24 0600 50.4 (111.11)    10/22/24 0559 52.3 (115.3)            Physical Exam  Vitals and nursing note reviewed.   Constitutional:       General: She is not in acute distress.     Appearance: She is well-developed.   HENT:      Head: Normocephalic and atraumatic.   Eyes:      General: No scleral icterus.     Extraocular Movements: Extraocular movements intact.      Conjunctiva/sclera: Conjunctivae normal.   Cardiovascular:      Rate and Rhythm: Normal rate and regular rhythm.      Pulses: Normal pulses.      Heart sounds: Normal heart sounds. No murmur heard.  Pulmonary:      Effort: Pulmonary effort is normal. No respiratory distress.      Breath sounds: Normal breath sounds.   Abdominal:      General: Bowel sounds are normal.      Palpations: Abdomen is soft.      Tenderness: There is no abdominal tenderness.   Musculoskeletal:         General: No swelling.      Cervical back: Neck supple.      Right lower leg: No edema.      Left lower leg: No edema.   Skin:     General: Skin is warm and dry.      Capillary Refill: Capillary refill takes less than 2 seconds.      Findings: No bruising or lesion.   Neurological:      General: No focal deficit present.      Mental Status: She is alert.      Cranial Nerves: Cranial nerves 2-12 are intact.   Psychiatric:         Mood and Affect: Mood normal.           Lab Results: I have reviewed the following results:  Recent Labs     10/20/24  1830 10/20/24  2030 10/21/24  0038 10/21/24  0448 10/22/24  0520 10/23/24  0443   WBC 3.15*  --   --  3.04* 4.67 5.09   HGB  8.9*  --   --  9.5* 9.3* 9.5*   HCT 27.7*  --   --  29.0* 28.5* 28.8*   *  --    < > 178 250 262   BANDSPCT  --   --   --   --  1  --    SODIUM 140  --   --  140 140 141   K 3.8  --   --  4.5 4.1 4.3     --   --  102 103 105   CO2 27  --   --  28 27 26   BUN 17  --   --  12 25 22   CREATININE 0.93  --   --  0.81 1.00 0.92   GLUC 151*  --   --  153* 142* 137   CAIONIZED  --   --   --  1.21  --   --    MG 1.9  --   --  2.7  --   --    PHOS  --   --   --  3.9  --   --    AST 12*  --   --  12*  --   --    ALT 12  --   --  12  --   --    ALB 3.8  --   --  3.9  --   --    TBILI 0.25  --   --  0.27  --   --    ALKPHOS 45  --   --  47  --   --    PTT 29  --   --   --   --   --    INR 0.94  --   --   --   --   --    HSTNI0 3  --   --   --   --   --    HSTNI2  --  4  --   --   --   --     < > = values in this interval not displayed.       Imaging Results Review: I personally reviewed the following image studies in PACS and associated radiology reports: chest xray, CT head, CT A neck and head, and MRI brain. My interpretation of the radiology images/reports is:  .  Other Study Results Review: EKG was reviewed.     Currently Ordered Meds:   Current Facility-Administered Medications:     acetaminophen (TYLENOL) tablet 650 mg, Q6H KRISS    amLODIPine (NORVASC) tablet 5 mg, Daily    atorvastatin (LIPITOR) tablet 20 mg, Daily With Dinner    [START ON 10/24/2024] dexamethasone (DECADRON) tablet 4 mg, Q6H KRISS **FOLLOWED BY** [START ON 10/28/2024] dexamethasone (DECADRON) tablet 4 mg, Q12H KRISS **FOLLOWED BY** [START ON 10/30/2024] dexamethasone (DECADRON) tablet 2 mg, Q12H KRISS **FOLLOWED BY** [START ON 11/2/2024] dexamethasone (DECADRON) tablet 2 mg, Daily    dexamethasone (PF) (DECADRON) injection 6 mg, Q6H KRISS    enoxaparin (LOVENOX) subcutaneous injection 30 mg, Q24H KRISS    levETIRAcetam (KEPPRA) injection 1,000 mg, Q12H KRISS    levothyroxine tablet 50 mcg, Early Morning    melatonin tablet 3 mg, HS PRN    ondansetron  (ZOFRAN) injection 4 mg, Q4H PRN    pantoprazole (PROTONIX) EC tablet 40 mg, Early Morning    senna-docusate sodium (SENOKOT S) 8.6-50 mg per tablet 2 tablet, BID    Facility-Administered Medications Ordered in Other Encounters:     fentaNYL injection, PRN    midazolam (VERSED) injection, PRN  VTE Pharmacologic Prophylaxis: Sequential compression device (Venodyne)  and Enoxaparin (Lovenox)  VTE Mechanical Prophylaxis: sequential compression device    Administrative Statements   I have spent a total time of 45 minutes in caring for this patient on the day of the visit/encounter including Diagnostic results, Prognosis, Patient and family education, Importance of tx compliance, Counseling / Coordination of care, Documenting in the medical record, Reviewing / ordering tests, medicine, procedures  , and Obtaining or reviewing history  .  Portions of the record may have been created with voice recognition software.

## 2024-10-23 NOTE — PLAN OF CARE
Problem: PAIN - ADULT  Goal: Verbalizes/displays adequate comfort level or baseline comfort level  Description: Interventions:  - Encourage patient to monitor pain and request assistance  - Assess pain using appropriate pain scale  - Administer analgesics based on type and severity of pain and evaluate response  - Implement non-pharmacological measures as appropriate and evaluate response  - Consider cultural and social influences on pain and pain management  - Notify physician/advanced practitioner if interventions unsuccessful or patient reports new pain  Outcome: Progressing     Problem: INFECTION - ADULT  Goal: Absence or prevention of progression during hospitalization  Description: INTERVENTIONS:  - Assess and monitor for signs and symptoms of infection  - Monitor lab/diagnostic results  - Monitor all insertion sites, i.e. indwelling lines, tubes, and drains  - Monitor endotracheal if appropriate and nasal secretions for changes in amount and color  - [place] appropriate cooling/warming therapies per order  - Administer medications as ordered  - Instruct and encourage patient and family to use good hand hygiene technique  - Identify and instruct in appropriate isolation precautions for identified infection/condition  Outcome: Progressing  Goal: Absence of fever/infection during neutropenic period  Description: INTERVENTIONS:  - Monitor WBC    Outcome: Progressing     Problem: SAFETY ADULT  Goal: Patient will remain free of falls  Description: INTERVENTIONS:  - Educate patient/family on patient safety including physical limitations  - Instruct patient to call for assistance with activity   - Consult OT/PT to assist with strengthening/mobility   - Keep Call bell within reach  - Keep bed low and locked with side rails adjusted as appropriate  - Keep care items and personal belongings within reach  - Initiate and maintain comfort rounds  - Make Fall Risk Sign visible to staff  - Offer Toileting every 2 Hours,  in advance of need  - Initiate/Maintain bed alarm  - Obtain necessary fall risk management equipment: bed alarm  - Apply yellow socks and bracelet for high fall risk patients  - Consider moving patient to room near nurses station  Outcome: Progressing  Goal: Maintain or return to baseline ADL function  Description: INTERVENTIONS:  -  Assess patient's ability to carry out ADLs; assess patient's baseline for ADL function and identify physical deficits which impact ability to perform ADLs (bathing, care of mouth/teeth, toileting, grooming, dressing, etc.)  - Assess/evaluate cause of self-care deficits   - Assess range of motion  - Assess patient's mobility; develop plan if impaired  - Assess patient's need for assistive devices and provide as appropriate  - Encourage maximum independence but intervene and supervise when necessary  - Involve family in performance of ADLs  - Assess for home care needs following discharge   - Consider OT consult to assist with ADL evaluation and planning for discharge  - Provide patient education as appropriate  Outcome: Progressing  Goal: Maintains/Returns to pre admission functional level  Description: INTERVENTIONS:  - Perform AM-PAC 6 Click Basic Mobility/ Daily Activity assessment daily.  - Set and communicate daily mobility goal to care team and patient/family/caregiver.   - Collaborate with rehabilitation services on mobility goals if consulted  - Perform Range of Motion 3 times a day.  - Reposition patient every 2 hours.  - Dangle patient 3 times a day  - Stand patient 3 times a day  - Ambulate patient 3 times a day  - Out of bed to chair 3 times a day   - Out of bed for meals 3 times a day  - Out of bed for toileting  - Record patient progress and toleration of activity level   Outcome: Progressing     Problem: DISCHARGE PLANNING  Goal: Discharge to home or other facility with appropriate resources  Description: INTERVENTIONS:  - Identify barriers to discharge w/patient and  caregiver  - Arrange for needed discharge resources and transportation as appropriate  - Identify discharge learning needs (meds, wound care, etc.)  - Arrange for interpretive services to assist at discharge as needed  - Refer to Case Management Department for coordinating discharge planning if the patient needs post-hospital services based on physician/advanced practitioner order or complex needs related to functional status, cognitive ability, or social support system  Outcome: Progressing     Problem: Knowledge Deficit  Goal: Patient/family/caregiver demonstrates understanding of disease process, treatment plan, medications, and discharge instructions  Description: Complete learning assessment and assess knowledge base.  Interventions:  - Provide teaching at level of understanding  - Provide teaching via preferred learning methods  Outcome: Progressing

## 2024-10-23 NOTE — ASSESSMENT & PLAN NOTE
Continue full-cares, including cancer-directed therapy:  Discussed case with Radiation Oncology. Recommend continuation of Dexamethasone 2 mg Daily (Without Taper), on hospital discharge, to avoid re-hospitalization.  Recommend outpatient follow-up with Medical Oncology, and Radiation Oncology, as scheduled.

## 2024-10-23 NOTE — ASSESSMENT & PLAN NOTE
Lipid panel 7/20/2024: , TG 76, HDL 60,   ASCVD of 18.4%.  Not on any statin therapy per chart review    Plan:  - Continue Lipitor 20 mg daily

## 2024-10-23 NOTE — ARC ADMISSION
[facility] community Liaison called  dtr maribel/family- introduced self, explained role, reviewed ARC program, services offered, acute rehab criteria, review of referral by AR[facility] Medical Director, insurance authorization process, the three ARC locations and anticipated rehab length of stay.; all questions answered. family first choice is [facility] and second is  [facility]. family made aware ARC reviewer will communicate with the  who will keep patient updated on referral status.      1530 - met with pt and pt's ex  at bedside. Answered pt/family questions. Pt agreeable to [facility] services, 1st choice is [facility], they were educated on  [facility] as option as well.

## 2024-10-23 NOTE — OCCUPATIONAL THERAPY NOTE
Occupational Therapy Progress Note     Patient Name: Ayla Nye  Today's Date: 10/23/2024  Problem List  Principal Problem:    Metastatic cancer to brain (HCC)  Active Problems:    Hypertension    Mixed hyperlipidemia    Malignant neoplasm of upper lobe, right bronchus or lung (HCC)    Hypothyroidism    Cancer associated pain    Palliative care patient    Acute encephalopathy     10/23/24 1228   OT Last Visit   OT Visit Date 10/23/24   Note Type   Note Type Treatment   Pain Assessment   Pain Assessment Tool 0-10   Pain Score No Pain   Restrictions/Precautions   Weight Bearing Precautions Per Order No   Other Precautions Cognitive;Chair Alarm;Bed Alarm;Telemetry;Fall Risk, visual impairment   Lifestyle   Autonomy I adls and mobility w/o ad - denies falls - (-) drives +right peripheral visual field cut , family/neighbor assists with iadls/transportation    Reciprocal Relationships supportive family who check on pt regularly, neighbors   Service to Others retired   Intrinsic Gratification staying active   ADL   Grooming Assistance 5  Supervision/Setup   Grooming Deficit Setup;Brushing hair   LB Dressing Assistance 5  Supervision/Setup   LB Dressing Deficit Setup;Don/doff R sock;Don/doff L sock  (+crossed over leg method, no lOB in sitting)   Bed Mobility   Supine to Sit 5  Supervision   Additional items Assist x 1;HOB elevated   Sit to Supine 5  Supervision   Additional items Assist x 1   Transfers   Sit to Stand 5  Supervision   Additional items Assist x 1   Stand to Sit 5  Supervision   Additional items Assist x 1   Additional Comments (-)AD   Functional Mobility   Functional Mobility 5  Supervision   Additional Comments S without AD short distance room functional mobilty to dresser able to access personal items from drawers upper/lower, retreive item from ground floor without LOB and good body mechanics/safety.  Occassional furniture walking (door jam)   Coordination   Fine Motor able to grasp pen and write  with good legibilty with right hand   Cognition   Overall Cognitive Status (S)  Impaired   Arousal/Participation Alert;Cooperative   Attention Attends with cues to redirect   Orientation Level Oriented X4   Memory Decreased recall of precautions;Decreased recall of recent events;Decreased short term memory   Following Commands Follows one step commands with increased time or repetition   Comments pt pleasant and cooperative, impaired attention, delayed recall and language with MOCA standardized and evidence based cognitive assessment. See below, will continue to assess.   Assessment   Assessment Patient participated in Skilled OT session this date with interventions consisting of self care tasks, functional transfers/functional mobility, MOCA standardized/evidence based cogntive assessment . Patient agreeable to OT treatment session, upon arrival patient was found supine in bed.  In comparison to previous session, patient with improvements in standing balance. Patient requiring verbal cues for correct technique and ocassional safety reminders. Patient continues to be functioning below baseline level, occupational performance remains limited secondary to factors listed above and increased risk for falls and injury.   From OT standpoint, pt making improvement with self care I, functional mobility/transfers with balance, continue with cogntive deficits recommendation at time of d/c would be min level III with focus on cognitive therapy. Continue to assess ability to live alone safely at discharge with continued cogntive assessments The patient's raw score on the AM-PAC Daily Activity Inpatient Short Form is 20. A raw score of greater than or equal to 19 suggests the patient may benefit from discharge to home. Please refer to the recommendation of the Occupational Therapist for safe discharge planning.  Patient to benefit from continued Occupational Therapy treatment while in the hospital to address deficits as defined  above and maximize level of functional independence with ADLs and functional mobility.   Plan   Treatment Interventions ADL retraining;Functional transfer training;Endurance training;Patient/family training;Compensatory technique education;Cognitive reorientation;Continued evaluation;Activityengagement   Goal Expiration Date 11/04/24   OT Treatment Day 1   OT Frequency 3-5x/wk   Discharge Recommendation   Rehab Resource Intensity Level, OT III (Minimum Resource Intensity)  (focus on cognitive therapy)   AM-PAC Daily Activity Inpatient   Lower Body Dressing 3   Bathing 3   Toileting 3   Upper Body Dressing 3   Grooming 4   Eating 4   Daily Activity Raw Score 20   Daily Activity Standardized Score (Calc for Raw Score >=11) 42.03   AM-PAC Applied Cognition Inpatient   Following a Speech/Presentation 2   Understanding Ordinary Conversation 4   Taking Medications 3   Remembering Where Things Are Placed or Put Away 4   Remembering List of 4-5 Errands 3   Taking Care of Complicated Tasks 3   Applied Cognition Raw Score 19   Applied Cognition Standardized Score 39.77   MOCA   Version (7.3)   Visuopatial/Executive 3 difficulty with 3 dimension drawing, drawing clock hands center and correctly on numbers   Naming 3   Memory 0   Attention: Digits 2   Attention: Letters 0 missed 4 letters   Attention: Serial 3   Language: Repeat 0 unable to accurately state sentences (2 errors)   Language: Fluency 1   Abstraction 0    Delayed Recall 2 able to recall 2/5 words, and 2 word with categorical cues   Orientation 6   Does patient have less than or equal to 12 years of education? 0   MOCA Total Score 20   End of Consult   Education Provided Yes  (Cognitive therapy HEP ([employer] activity workbook #2, find picture difference worksheet) pt receptive)   Patient Position at End of Consult Supine;Bed/Chair alarm activated;All needs within reach   Nurse Communication Nurse aware of consult      Miley SHIMPAN/L

## 2024-10-23 NOTE — ASSESSMENT & PLAN NOTE
Palliative Diagnosis: Recurrent metastatic non-small cell lung cancer with intracranial metastasis.    Goals:   Continue full-cares, including cancer-directed therapy.  Palliative Care will continue to follow for symptom-management.    Social Support:  Supportive listening provided.  Normalized experience of patient.  Provided anxiety containment.  Advocated for patient/family with interdisciplinary team.  Mediated conflict.    Care Coordination:  Case discussed with patient, and Radiation Oncology.    Follow-Up:  Palliative Care to continue to follow for symptom-management.  Please do not hesitate to contact our on-call provider through [facility] Secure Chat or contact [phone] should there be an acute change or other symptom control concerns.

## 2024-10-23 NOTE — PROGRESS NOTES
Progress Note - Neurology   Name: Ayla Nye 73 y.o. 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  She has received chemotherapy and radiation therapy and has been maintained on chronic steroid therapy and Keppra prior to this admission. She presented on 10/20/24 with increased confusion and worsening aphasia. Her home Keppra was increased to 1000 mg BID from 750 mg BID and she received a load of Keppra initially in the ED upon presentation. She is currently on decadron 6 mg q6hr with the consideration of transitioning to 4 mg q6hr for 4 days per primary team. On exam, she has a known right inferior temporal field deficit. She is overall appearing improved compared to her initial presentation.     Recent imaging:  CT stroke alert brain 10/20:Stable vasogenic edema in the left parietal temporal region. Previously demonstrated left posterior parietal lesion is less well-visualized likely due to differences in technique.  CTA stroke alert head and neck 10/20:No hemodynamically significant stenosis, dissection or occlusion of the carotid or vertebral arteries or major vessels of the [place].  MRI brain seizure with and without contrast 10/21:Redemonstration of intracranial metastatic lesions. 2.3 cm enhancing lesion at the left occipital lobe is slightly increased in maximum dimension with increased central necrosis at the anterior margin that may be partly due to radiation necrosis. Additional smaller metastases are stable or mildly decreased in size. Stable left parieto-occipital edema with mass effect on the left lateral ventricle. No new metastases. No acute infarct or hemorrhage  EEG this admission: Largely unchanged compared to prior studies, no seizure noted. Left hemisphere  slowing, left posterior quadrant maximal. There were also left posterior temporal / parietal poorly formed (blunt and at times sharply contoured) LPDs occurring at 0.5-1 Hz. The LPDs were not consistently present during the later portions of recording. Right hemisphere was essentially normal.     Impressions: In this patient with known brain metastases to the left occipital lobe with left parieto-occipital edema and mass effect in conjunction with episodes of transient reported flashing lights in her right eye followed by confusion in the setting of steroid weaning, there is a concern for seizure activity. Agree with the increase in Keppra from her home regimen. She has been monitored on video EEG, so far no events captured. Continue decadron for now, will leave titration up to her primary team/oncology.     Plan:  Continue Keppra 1000 mg BID upon discharge if no further seizure-like activity   Continue Decadron, will defer to her primary medical team with regards to titration/weaning. If she continues to have seizure-like activity, we can always re-evaluate her AED regimen to optimize  Follow-up with epilepsy clinic in 4-6 weeks  Although CTA did not show any signs of stenosis, concern for painless monocular visual loss in one quadrant. She reports seeing an ophthalmologist this summer who did not find anything remarkable on exam per her knowledge. Cannot rule out possible retinal artery branch occlusion. In light of this possibility, consider switching from aspirin to plavix given her increased risk for CVA in the setting of underlying malignancy and extensive prior smoking history. Patient also notes that she is unsure when this visual field cut first started. She was told that she has cataracts in both eyes and needs surgery, but was recommended to hold off in the setting of chemotherapy. She has not received surgery yet, recommend outpatient opthalmology follow-up. Consider outpatient zio patch as  well  Continue frequent neuro checks  STAT CT head with any neurological decline  Malignant neoplasm of upper lobe, right bronchus or lung (HCC)  Per oncology and radiation oncology team, appreciate recommendations with regards to steroid taper    Ayla Nye will need follow up in in 4 weeks with epilepsy attending or advance practitioner. She will not require outpatient neurological testing.  Neurology service will sign off. Please reach out for any additional concerns.     Subjective   Today, Ms. Nye reports that she is doing well. She has not had any more episodes of light sensation/visual disturbances in her right eye. She denies worsening confusion, speech difficulty, weakness, numbness, or balance issues. She is walking with assistance without difficulty.     Review of Systems   Neurological:  Negative for dizziness, seizures, syncope, speech difficulty, weakness, light-headedness, numbness and headaches.       Objective :  Temp:  [97.7 °F (36.5 °C)-98 °F (36.7 °C)] 97.7 °F (36.5 °C)  HR:  [66] 66  BP: (145-155)/(70-77) 155/70  Resp:  [16-17] 17  SpO2:  [96 %-99 %] 96 %  O2 Device: None (Room air)    Physical Exam  Constitutional:       General: She is awake.   Eyes:      General: Lids are normal.      Extraocular Movements: Extraocular movements intact.      Pupils: Pupils are equal, round, and reactive to light.   Neurological:      Mental Status: She is alert.      Motor: Motor strength is normal.  Psychiatric:         Speech: Speech normal.     Neurological Exam  Mental Status  Awake and alert. Oriented to person, place and time. Speech is normal. Follows three-step commands. Attention and concentration are normal. Fund of knowledge is appropriate for level of education.    Cranial Nerves  CN II: Right inferior quadrantanopsia.  CN III, IV, VI: Extraocular movements intact bilaterally. Normal lids and orbits bilaterally. Pupils equal round and reactive to light bilaterally.  CN V: Facial  sensation is normal.  CN VII: Full and symmetric facial movement.  CN VIII: Hearing is normal.  CN IX, X: Palate elevates symmetrically. Normal gag reflex.  CN XI: Shoulder shrug strength is normal.  CN XII: Tongue midline without atrophy or fasciculations.  Right temporal inferior visual field deficit.    Motor   No abnormal involuntary movements. Strength is 5/5 throughout all four extremities.    Sensory  Light touch is normal in upper and lower extremities.     Coordination  Right: Finger-to-nose normal.Left: Finger-to-nose normal.        Lab Results: I have reviewed the following results:  Imaging Results Review: I reviewed radiology reports from this admission including: CT head and MRI brain.    VTE Pharmacologic Prophylaxis: Enoxaparin (Lovenox)

## 2024-10-23 NOTE — PLAN OF CARE
Problem: OCCUPATIONAL THERAPY ADULT  Goal: Performs self-care activities at highest level of function for planned discharge setting.  See evaluation for individualized goals.  Description: Treatment Interventions: ADL retraining, Functional transfer training, Endurance training, Cognitive reorientation, Patient/family training, Equipment evaluation/education, Compensatory technique education, Activityengagement          See flowsheet documentation for full assessment, interventions and recommendations.   Note: Limitation: Decreased ADL status, Decreased Safe judgement during ADL, Decreased cognition, Decreased endurance, Decreased self-care trans, Decreased high-level ADLs  Prognosis: Good  Assessment: Patient participated in Skilled OT session this date with interventions consisting of self care tasks, functional transfers/functional mobility, MOCA standardized/evidence based cogntive assessment . Patient agreeable to OT treatment session, upon arrival patient was found supine in bed.  In comparison to previous session, patient with improvements in standing balance. Patient requiring verbal cues for correct technique and ocassional safety reminders. Patient continues to be functioning below baseline level, occupational performance remains limited secondary to factors listed above and increased risk for falls and injury.   From OT standpoint, recommendation at time of d/c would be min level III with focus on cognitive therapy.  The patient's raw score on the -PAC Daily Activity Inpatient Short Form is 20. A raw score of greater than or equal to 19 suggests the patient may benefit from discharge to home. Please refer to the recommendation of the Occupational Therapist for safe discharge planning.  Patient to benefit from continued Occupational Therapy treatment while in the hospital to address deficits as defined above and maximize level of functional independence with ADLs and functional mobility.     Rehab  Resource Intensity Level, OT: III (Minimum Resource Intensity) (focus on cognitive therapy)

## 2024-10-23 NOTE — SPEECH THERAPY NOTE
Speech-Language Pathology Progress Note      Patient Name: Ayla Nye    Today's Date: 10/23/2024      Consult received and chart reviewed. Per chart review and discussion with patient and RN, pt passed RN dysphagia assessment and is tolerating regular diet with thin liquids with no s/s of aspiration. Speech/Language deficits also denied. Formal speech/swallow evaluation does not appear to be indicated at this time. If new concerns arise, please reconsult. Thank you.

## 2024-10-23 NOTE — ASSESSMENT & PLAN NOTE
Patient has a history of NSC lung adenocarcinoma with metastases to the brain.  She has received chemotherapy and radiation therapy and has been maintained on chronic steroid therapy and Keppra prior to this admission. She presented on 10/20/24 with increased confusion and worsening aphasia. Her home Keppra was increased to 1000 mg BID from 750 mg BID and she received a load of Keppra initially in the ED upon presentation. She is currently on decadron 6 mg q6hr with the consideration of transitioning to 4 mg q6hr for 4 days per primary team. On exam, she has a known right inferior temporal field deficit. She is overall appearing improved compared to her initial presentation.     Recent imaging:  CT stroke alert brain 10/20:Stable vasogenic edema in the left parietal temporal region. Previously demonstrated left posterior parietal lesion is less well-visualized likely due to differences in technique.  CTA stroke alert head and neck 10/20:No hemodynamically significant stenosis, dissection or occlusion of the carotid or vertebral arteries or major vessels of the [place].  MRI brain seizure with and without contrast 10/21:Redemonstration of intracranial metastatic lesions. 2.3 cm enhancing lesion at the left occipital lobe is slightly increased in maximum dimension with increased central necrosis at the anterior margin that may be partly due to radiation necrosis. Additional smaller metastases are stable or mildly decreased in size. Stable left parieto-occipital edema with mass effect on the left lateral ventricle. No new metastases. No acute infarct or hemorrhage  EEG this admission: Largely unchanged compared to prior studies, no seizure noted. Left hemisphere slowing, left posterior quadrant maximal. There were also left posterior temporal / parietal poorly formed (blunt and at times sharply contoured) LPDs occurring at 0.5-1 Hz. The LPDs were not consistently present during the later portions of recording.  Right hemisphere was essentially normal.     Impressions: In this patient with known brain metastases to the left occipital lobe with left parieto-occipital edema and mass effect in conjunction with episodes of transient reported flashing lights in her right eye followed by confusion in the setting of steroid weaning, there is a concern for seizure activity. Agree with the increase in Keppra from her home regimen. She has been monitored on video EEG, so far no events captured. Continue decadron for now, will leave titration up to her primary team/oncology.     Plan:  Continue Keppra 1000 mg BID upon discharge if no further seizure-like activity   Continue Decadron, will defer to her primary medical team with regards to titration/weaning. If she continues to have seizure-like activity, we can always re-evaluate her AED regimen to optimize  Follow-up with epilepsy clinic in 4-6 weeks  Although CTA did not show any signs of stenosis, concern for painless monocular visual loss in one quadrant. She reports seeing an ophthalmologist this summer who did not find anything remarkable on exam per her knowledge. Cannot rule out possible retinal artery branch occlusion. In light of this possibility, consider switching from aspirin to plavix given her increased risk for CVA in the setting of underlying malignancy and extensive prior smoking history. Patient also notes that she is unsure when this visual field cut first started. She was told that she has cataracts in both eyes and needs surgery, but was recommended to hold off in the setting of chemotherapy. She has not received surgery yet, recommend outpatient opthalmology follow-up. Consider outpatient zio patch as well  Continue frequent neuro checks  STAT CT head with any neurological decline

## 2024-10-23 NOTE — TELEPHONE ENCOUNTER
STILL ADMITTED:10/21/2024 - present (2 days)  [facility]      HFU/ SL BRIAN/ Seizure-like activity    ----- Message from Jaci Chu MD sent at 10/23/2024  9:36 AM EDT -----  Regarding: Hospital Follow-up  Ayla Nye will need follow-up in 4 weeks with epilepsy team for seizure-like activity in the setting of known brain metastases in a 60 minute appointment. She will not require outpatient neurological testing.

## 2024-10-23 NOTE — ARC ADMISSION
Referral received for consideration of patient for inpatient acute rehab.    Reviewed patient's case - patient is preapproved for ARC pending medical stability, functional progress/tolerance and bed availability. Noted that PT is to see patient first thing tomorrow morning, and SLP evaluation has also been requested. Patient needs to have 2 therapy disciplines to meet ARC criteria. CM has been updated. Will continue to follow at this time.

## 2024-10-23 NOTE — ASSESSMENT & PLAN NOTE
Continue current pain-regimen:  Continue Acetaminophen 650 mg q6HR (Scheduled).  Note: Ketorolac was discontinued. Consider re-initiation of non-steroidal anti-inflammatory agent for severe-pain refractory to Acetaminophen.  Failed-therapies:  Patient requesting avoidance of Opiates, as well as reinitiation of Gabapentin.    Subjective:  Interval History: Patient was seen and examined on the General Medical floor. Resting comfortably in bed, on my initial arrival. No acute events noted, overnight. Patient endorses improved pain-control with adjustment of Acetaminophen. Intermittent exacerbations of pain noted, where it becomes severe; however, it is otherwise manageable. She has no further concerns or complaints at the present time.    Review of Systems:  All systems reviewed and negative except otherwise listed in the History of Present Illness.    Objective:  Vitals:    10/23/24 1547   BP: 145/88   Pulse: 64   Resp: 18   Temp: 97.9 °F (36.6 °C)   SpO2: 96%     Physical Exam:   General: Alert, and oriented; Pleasant, and conversational; Overall, Well-Appearing  HEENT: Atraumatic, and normocephalic; PERRLA; EOMI; Moist mucosal membranes  Respiratory: Normal work of breathing; No supplemental-oxygen requirement  Extremities: No obvious deformities; No peripheral edema; Moves all  Neurologic: Appropriately alert, and oriented to Person, Place, and Time    WBC   Date Value Ref Range Status   10/23/2024 5.09 4.31 - 10.16 Thousand/uL Final   10/22/2024 4.67 4.31 - 10.16 Thousand/uL Final   10/21/2024 3.04 (L) 4.31 - 10.16 Thousand/uL Final     Hemoglobin   Date Value Ref Range Status   10/23/2024 9.5 (L) 11.5 - 15.4 g/dL Final   10/22/2024 9.3 (L) 11.5 - 15.4 g/dL Final   10/21/2024 9.5 (L) 11.5 - 15.4 g/dL Final     Platelets   Date Value Ref Range Status   10/23/2024 262 149 - 390 Thousands/uL Final   10/22/2024 250 149 - 390 Thousands/uL Final   10/21/2024 178 149 - 390 Thousands/uL Final     MCV   Date Value Ref  Range Status   10/23/2024 99 (H) 82 - 98 fL Final   10/22/2024 99 (H) 82 - 98 fL Final   10/21/2024 101 (H) 82 - 98 fL Final      Potassium   Date Value Ref Range Status   10/23/2024 4.3 3.5 - 5.3 mmol/L Final   10/22/2024 4.1 3.5 - 5.3 mmol/L Final   10/21/2024 4.5 3.5 - 5.3 mmol/L Final     Chloride   Date Value Ref Range Status   10/23/2024 105 96 - 108 mmol/L Final   10/22/2024 103 96 - 108 mmol/L Final   10/21/2024 102 96 - 108 mmol/L Final     CO2   Date Value Ref Range Status   10/23/2024 26 21 - 32 mmol/L Final   10/22/2024 27 21 - 32 mmol/L Final   10/21/2024 28 21 - 32 mmol/L Final     BUN   Date Value Ref Range Status   10/23/2024 22 5 - 25 mg/dL Final   10/22/2024 25 5 - 25 mg/dL Final   10/21/2024 12 5 - 25 mg/dL Final     Creatinine   Date Value Ref Range Status   10/23/2024 0.92 0.60 - 1.30 mg/dL Final     Comment:     Standardized to IDMS reference method   10/22/2024 1.00 0.60 - 1.30 mg/dL Final     Comment:     Standardized to IDMS reference method   10/21/2024 0.81 0.60 - 1.30 mg/dL Final     Comment:     Standardized to IDMS reference method     Glucose   Date Value Ref Range Status   10/23/2024 137 65 - 140 mg/dL Final     Comment:     If the patient is fasting, the ADA then defines impaired fasting glucose as > 100 mg/dL and diabetes as > or equal to 123 mg/dL.   10/22/2024 142 (H) 65 - 140 mg/dL Final     Comment:     If the patient is fasting, the ADA then defines impaired fasting glucose as > 100 mg/dL and diabetes as > or equal to 123 mg/dL.   10/21/2024 153 (H) 65 - 140 mg/dL Final     Comment:     If the patient is fasting, the ADA then defines impaired fasting glucose as > 100 mg/dL and diabetes as > or equal to 123 mg/dL.     Calcium   Date Value Ref Range Status   10/23/2024 9.0 8.4 - 10.2 mg/dL Final   10/22/2024 9.1 8.4 - 10.2 mg/dL Final   10/21/2024 9.4 8.4 - 10.2 mg/dL Final     Albumin   Date Value Ref Range Status   10/21/2024 3.9 3.5 - 5.0 g/dL Final   10/20/2024 3.8 3.5 - 5.0  g/dL Final   10/03/2024 3.7 3.5 - 5.0 g/dL Final     Total Bilirubin   Date Value Ref Range Status   10/21/2024 0.27 0.20 - 1.00 mg/dL Final     Comment:     Use of this assay is not recommended for patients undergoing treatment with eltrombopag due to the potential for falsely elevated results.  N-acetyl-p-benzoquinone imine (metabolite of Acetaminophen) will generate erroneously low results in samples for patients that have taken an overdose of Acetaminophen.   10/20/2024 0.25 0.20 - 1.00 mg/dL Final     Comment:     Use of this assay is not recommended for patients undergoing treatment with eltrombopag due to the potential for falsely elevated results.  N-acetyl-p-benzoquinone imine (metabolite of Acetaminophen) will generate erroneously low results in samples for patients that have taken an overdose of Acetaminophen.   10/03/2024 0.30 0.20 - 1.00 mg/dL Final     Comment:     Use of this assay is not recommended for patients undergoing treatment with eltrombopag due to the potential for falsely elevated results.  N-acetyl-p-benzoquinone imine (metabolite of Acetaminophen) will generate erroneously low results in samples for patients that have taken an overdose of Acetaminophen.     Alkaline Phosphatase   Date Value Ref Range Status   10/21/2024 47 34 - 104 U/L Final   10/20/2024 45 34 - 104 U/L Final   10/03/2024 34 34 - 104 U/L Final     AST   Date Value Ref Range Status   10/21/2024 12 (L) 13 - 39 U/L Final   10/20/2024 12 (L) 13 - 39 U/L Final   10/03/2024 12 (L) 13 - 39 U/L Final     ALT   Date Value Ref Range Status   10/21/2024 12 7 - 52 U/L Final     Comment:     Specimen collection should occur prior to Sulfasalazine administration due to the potential for falsely depressed results.    10/20/2024 12 7 - 52 U/L Final     Comment:     Specimen collection should occur prior to Sulfasalazine administration due to the potential for falsely depressed results.    10/03/2024 12 7 - 52 U/L Final     Comment:      "Specimen collection should occur prior to Sulfasalazine administration due to the potential for falsely depressed results.       LD   Date Value Ref Range Status   10/03/2024 461 (H) 140 - 271 U/L Final   09/13/2024 256 140 - 271 U/L Final   08/24/2024 207 140 - 271 U/L Final    No results found for: \"TSH\" No results found for: \"N9FWQNC\"   Free T4   Date Value Ref Range Status   09/13/2024 0.92 0.61 - 1.12 ng/dL Final     Comment:     Specimens with biotin concentrations > 10 ng/mL can lead to significant (> 10%) positive bias in result.   08/24/2024 0.77 0.61 - 1.12 ng/dL Final     Comment:     Specimens with biotin concentrations > 10 ng/mL can lead to significant (> 10%) positive bias in result.      Patient was seen and discussed with attending physician, Carolina Taylor D.O.    Salome Underwood D.O.  Hematology-Oncology Fellow (PGY-5)   "

## 2024-10-24 ENCOUNTER — APPOINTMENT (OUTPATIENT)
Dept: RADIATION ONCOLOGY | Facility: HOSPITAL | Age: 74
End: 2024-10-24
Attending: INTERNAL MEDICINE
Payer: MEDICARE

## 2024-10-24 ENCOUNTER — APPOINTMENT (OUTPATIENT)
Dept: RADIATION ONCOLOGY | Facility: HOSPITAL | Age: 74
End: 2024-10-24
Payer: MEDICARE

## 2024-10-24 LAB
ANION GAP SERPL CALCULATED.3IONS-SCNC: 13 MMOL/L (ref 4–13)
BUN SERPL-MCNC: 24 MG/DL (ref 5–25)
CALCIUM SERPL-MCNC: 8.9 MG/DL (ref 8.4–10.2)
CHLORIDE SERPL-SCNC: 105 MMOL/L (ref 96–108)
CO2 SERPL-SCNC: 24 MMOL/L (ref 21–32)
CREAT SERPL-MCNC: 0.91 MG/DL (ref 0.6–1.3)
ERYTHROCYTE [DISTWIDTH] IN BLOOD BY AUTOMATED COUNT: 13.6 % (ref 11.6–15.1)
GFR SERPL CREATININE-BSD FRML MDRD: 62 ML/MIN/1.73SQ M
GLUCOSE SERPL-MCNC: 127 MG/DL (ref 65–140)
HCT VFR BLD AUTO: 30.6 % (ref 34.8–46.1)
HGB BLD-MCNC: 9.8 G/DL (ref 11.5–15.4)
MCH RBC QN AUTO: 32.9 PG (ref 26.8–34.3)
MCHC RBC AUTO-ENTMCNC: 32 G/DL (ref 31.4–37.4)
MCV RBC AUTO: 103 FL (ref 82–98)
NRBC BLD AUTO-RTO: 0 /100 WBCS
PLATELET # BLD AUTO: 330 THOUSANDS/UL (ref 149–390)
PMV BLD AUTO: 8.9 FL (ref 8.9–12.7)
POTASSIUM SERPL-SCNC: 3.9 MMOL/L (ref 3.5–5.3)
RBC # BLD AUTO: 2.98 MILLION/UL (ref 3.81–5.12)
SODIUM SERPL-SCNC: 142 MMOL/L (ref 135–147)
WBC # BLD AUTO: 6.92 THOUSAND/UL (ref 4.31–10.16)

## 2024-10-24 PROCEDURE — 97116 GAIT TRAINING THERAPY: CPT

## 2024-10-24 PROCEDURE — 99232 SBSQ HOSP IP/OBS MODERATE 35: CPT | Performed by: INTERNAL MEDICINE

## 2024-10-24 PROCEDURE — 97530 THERAPEUTIC ACTIVITIES: CPT

## 2024-10-24 PROCEDURE — 80048 BASIC METABOLIC PNL TOTAL CA: CPT

## 2024-10-24 PROCEDURE — 85027 COMPLETE CBC AUTOMATED: CPT

## 2024-10-24 RX ORDER — GABAPENTIN 300 MG/1
300 CAPSULE ORAL
Status: DISCONTINUED | OUTPATIENT
Start: 2024-10-24 | End: 2024-10-25

## 2024-10-24 RX ADMIN — ACETAMINOPHEN 650 MG: 325 TABLET, FILM COATED ORAL at 23:48

## 2024-10-24 RX ADMIN — DEXAMETHASONE 4 MG: 4 TABLET ORAL at 23:48

## 2024-10-24 RX ADMIN — PANTOPRAZOLE SODIUM 40 MG: 40 TABLET, DELAYED RELEASE ORAL at 05:48

## 2024-10-24 RX ADMIN — GABAPENTIN 300 MG: 300 CAPSULE ORAL at 21:42

## 2024-10-24 RX ADMIN — ATORVASTATIN CALCIUM 20 MG: 20 TABLET, FILM COATED ORAL at 17:02

## 2024-10-24 RX ADMIN — AMLODIPINE BESYLATE 5 MG: 5 TABLET ORAL at 10:02

## 2024-10-24 RX ADMIN — LEVOTHYROXINE SODIUM 50 MCG: 50 TABLET ORAL at 05:48

## 2024-10-24 RX ADMIN — LEVETIRACETAM 1000 MG: 100 INJECTION, SOLUTION INTRAVENOUS at 21:42

## 2024-10-24 RX ADMIN — ACETAMINOPHEN 650 MG: 325 TABLET, FILM COATED ORAL at 05:48

## 2024-10-24 RX ADMIN — ACETAMINOPHEN 650 MG: 325 TABLET, FILM COATED ORAL at 17:03

## 2024-10-24 RX ADMIN — ACETAMINOPHEN 650 MG: 325 TABLET, FILM COATED ORAL at 11:29

## 2024-10-24 RX ADMIN — DEXAMETHASONE 4 MG: 4 TABLET ORAL at 11:30

## 2024-10-24 RX ADMIN — DEXAMETHASONE 4 MG: 4 TABLET ORAL at 17:02

## 2024-10-24 RX ADMIN — LEVETIRACETAM 1000 MG: 100 INJECTION, SOLUTION INTRAVENOUS at 10:02

## 2024-10-24 RX ADMIN — DEXAMETHASONE 4 MG: 4 TABLET ORAL at 05:48

## 2024-10-24 RX ADMIN — Medication 6 MG: at 21:42

## 2024-10-24 NOTE — PROGRESS NOTES
Patient:    MRN:  [id number]    [place] Request ID:  [id number]    Level of care reserved:  Inpatient Rehab Facility    Partner Reserved:  [facility] ([place]), [place] [address] [phone]    Clinical needs requested:    Geography searched:  10 miles around [id number]    Start of Service:    Request sent:  10:08am EDT on 10/23/2024 by Rina Quezada    Partner reserved:  9:53am EDT on 10/24/2024 by Rina Quezada    Choice list shared:  9:53am EDT on 10/24/2024 by Rina Quezada

## 2024-10-24 NOTE — ASSESSMENT & PLAN NOTE
Continue full-cares, including cancer-directed therapy:  Discussed case with Radiation Oncology. Recommend Dexamethasone taper to a baseline (e.g. Minimum-dose) of 2 mg Daily on hospital discharge.  Recommend outpatient follow-up with Medical Oncology, and Radiation Oncology, as scheduled.  Note: Patient has scheduled follow-up with Palliative Care on October 31st, 2024 (Home Visit).

## 2024-10-24 NOTE — ASSESSMENT & PLAN NOTE
Patient with history of non-small cell lung adenocarcinoma diagnosed in March 2024.  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  She completed SRS for these metastatic lesions in August 2024.  She is on Decadron in the outpatient setting and has had multiple admissions due to episodes of altered mental status with increased vasogenic edema after attempting to taper Decadron dosing.  She presented to [facility] on 10/20 with worsening confusion and a stroke alert was called.  Imaging showed no acute findings.  She was loaded with Keppra, started on Decadron, and transferred to [place] for video EEG and MRI brain under neurocritical care for close monitoring.  The morning of 10/21 patient's mental status reportedly much improved and she was deemed stable for transfer out of the ICU.    Symptoms likely in the setting of Decadron taper    Of note, patient is not exactly sure what her current Decadron dosing is.  With that said, it appears that she had been on a Decadron taper throughout September starting at 8 mg daily that was reduced each week.  Her most recent orders show Decadron 1 mg daily from 10/17-10/24 and then 0.5 mg daily for 8 more days    vEEG update as of today: 26 hours of recording. No seizures. Continues to have left hemisphere slowing, left posterior quadrant maximal. There are also left posterior temporal / parietal poorly formed (blunt and at times sharply contoured) LPDs occurring at 0.5-1 Hz. LPDs indicate high seizure risk and are often associated with acute/subacute destructive neuronal injury. Right hemisphere is essentially normal.     Blood cultures no growth at 48 hours    Plan:  - MRI 10/21 show stable vasogenic edema in the left parietotemporal region; previously demonstrated left posterior parietal lesion as well visualized likely due to differences in technique.  No new metastasis, no new acute infarct or hemorrhage.  - Start VTE  prophylaxis with Lovenox  today  - Neurosurgery, neurology, and palliative care consulted.  Appreciate assistance  - Per neurosurgery, no emergent neurosurgical intervention warranted  - Per neurology, continue Keppra 100 mg BID , okay to discharge with this dosage.  Also recommended Plavix or aspirin given right monocular inferior temporal vision for impairment at high risk of developing CV A. Follow-up with epilepsy clinic in 4 to 6 weeks  - Monitor on video EEG  - Transition to Decadron 4 mg every 6 hours starting today until 10/30 followed by 2 mg BID on 10/31 to 11/1, and 2 mg on 11/2 to 11/11. Patient should have enough doses until her heme-onc appointment on 11/5/24  - Continue monitoring glucose given on dexamethasone  - SBP goal <160  - Stat CT head with any neurological decline for return of altered mental status  - Neurochecks every 4 hours  - Seizure precautions in place  - PT/OT recommended level 1   - Waiting for [facility]

## 2024-10-24 NOTE — ASSESSMENT & PLAN NOTE
Continue current pain-regimen:  Continue Acetaminophen 650 mg q6HR (Scheduled).  Internal Medicine initiated Gabapentin 100 mg Bedtime, at patient request.  Note: Ketorolac was discontinued. Consider re-initiation of non-steroidal anti-inflammatory agent for severe-pain refractory to Acetaminophen.  Failed-therapies:  Patient requesting avoidance of Opiates; however, is now open to re-trial of Gabapentin.    Subjective:  Interval History: Patient was seen and examined on the General Medical floor. Resting comfortably in bed, on my initial arrival. No acute events noted, overnight. Patient endorses improved pain-control with continuation of Acetaminophen 650 mg Q6HR (Scheduled); however, notes breakthrough pain with severity reaching upwards of 5/10 on the pain-scale. She remains resistant to addition of opiates to her current pain-regimen; although is open to use of a non-steroidal anti-inflammatory agent if necessary for severe pain. Of note, I was contacted by Internal Medicine regarding patient request to re-trial Gabapentin. They have since ordered Gabapentin 100 mg Bedtime. Of note, patient was evaluated by Physical Medicine and Rehabilitation team for consideration of transfer to the acute rehabilitation unit. Definitive recommendations are pending at this time; however, patient remains hopeful to participate in a more intensive physical therapy program. Otherwise, she has no further complaints or concerns. Addressed all questions, including plan for discharge on Dexamethasone. Per conversation with Radiation Oncology, will plan to taper to a minimum-dose of Dexamethasone 2 mg Daily (e.g. Will not taper below the above-mentioned). Patient is in agreement.     Review of Systems:  All systems reviewed and negative except otherwise listed in the History of Present Illness.    Objective:  Vitals:    10/23/24 2026   BP: 147/82   Pulse: 63   Resp: 16   Temp: 97.7 °F (36.5 °C)   SpO2: 96%     Physical Exam:    General: Alert, and oriented; Pleasant, and conversational; Overall, Well-Appearing  HEENT: Atraumatic, and normocephalic; PERRLA; EOMI; Moist mucosal membranes  Respiratory: Normal work of breathing; No supplemental-oxygen requirement  Extremities: No obvious deformities; No peripheral edema; Moves all  Neurologic: Appropriately alert, and oriented to Person, Place, and Time    WBC   Date Value Ref Range Status   10/24/2024 6.92 4.31 - 10.16 Thousand/uL Final   10/23/2024 5.09 4.31 - 10.16 Thousand/uL Final   10/22/2024 4.67 4.31 - 10.16 Thousand/uL Final     Hemoglobin   Date Value Ref Range Status   10/24/2024 9.8 (L) 11.5 - 15.4 g/dL Final   10/23/2024 9.5 (L) 11.5 - 15.4 g/dL Final   10/22/2024 9.3 (L) 11.5 - 15.4 g/dL Final     Platelets   Date Value Ref Range Status   10/24/2024 330 149 - 390 Thousands/uL Final   10/23/2024 262 149 - 390 Thousands/uL Final   10/22/2024 250 149 - 390 Thousands/uL Final     MCV   Date Value Ref Range Status   10/24/2024 103 (H) 82 - 98 fL Final   10/23/2024 99 (H) 82 - 98 fL Final   10/22/2024 99 (H) 82 - 98 fL Final      Potassium   Date Value Ref Range Status   10/24/2024 3.9 3.5 - 5.3 mmol/L Final   10/23/2024 4.3 3.5 - 5.3 mmol/L Final   10/22/2024 4.1 3.5 - 5.3 mmol/L Final     Chloride   Date Value Ref Range Status   10/24/2024 105 96 - 108 mmol/L Final   10/23/2024 105 96 - 108 mmol/L Final   10/22/2024 103 96 - 108 mmol/L Final     CO2   Date Value Ref Range Status   10/24/2024 24 21 - 32 mmol/L Final   10/23/2024 26 21 - 32 mmol/L Final   10/22/2024 27 21 - 32 mmol/L Final     BUN   Date Value Ref Range Status   10/24/2024 24 5 - 25 mg/dL Final   10/23/2024 22 5 - 25 mg/dL Final   10/22/2024 25 5 - 25 mg/dL Final     Creatinine   Date Value Ref Range Status   10/24/2024 0.91 0.60 - 1.30 mg/dL Final     Comment:     Standardized to IDMS reference method   10/23/2024 0.92 0.60 - 1.30 mg/dL Final     Comment:     Standardized to IDMS reference method   10/22/2024 1.00  0.60 - 1.30 mg/dL Final     Comment:     Standardized to IDMS reference method     Glucose   Date Value Ref Range Status   10/24/2024 127 65 - 140 mg/dL Final     Comment:     If the patient is fasting, the ADA then defines impaired fasting glucose as > 100 mg/dL and diabetes as > or equal to 123 mg/dL.   10/23/2024 137 65 - 140 mg/dL Final     Comment:     If the patient is fasting, the ADA then defines impaired fasting glucose as > 100 mg/dL and diabetes as > or equal to 123 mg/dL.   10/22/2024 142 (H) 65 - 140 mg/dL Final     Comment:     If the patient is fasting, the ADA then defines impaired fasting glucose as > 100 mg/dL and diabetes as > or equal to 123 mg/dL.     Calcium   Date Value Ref Range Status   10/24/2024 8.9 8.4 - 10.2 mg/dL Final   10/23/2024 9.0 8.4 - 10.2 mg/dL Final   10/22/2024 9.1 8.4 - 10.2 mg/dL Final     Albumin   Date Value Ref Range Status   10/21/2024 3.9 3.5 - 5.0 g/dL Final   10/20/2024 3.8 3.5 - 5.0 g/dL Final   10/03/2024 3.7 3.5 - 5.0 g/dL Final     Total Bilirubin   Date Value Ref Range Status   10/21/2024 0.27 0.20 - 1.00 mg/dL Final     Comment:     Use of this assay is not recommended for patients undergoing treatment with eltrombopag due to the potential for falsely elevated results.  N-acetyl-p-benzoquinone imine (metabolite of Acetaminophen) will generate erroneously low results in samples for patients that have taken an overdose of Acetaminophen.   10/20/2024 0.25 0.20 - 1.00 mg/dL Final     Comment:     Use of this assay is not recommended for patients undergoing treatment with eltrombopag due to the potential for falsely elevated results.  N-acetyl-p-benzoquinone imine (metabolite of Acetaminophen) will generate erroneously low results in samples for patients that have taken an overdose of Acetaminophen.   10/03/2024 0.30 0.20 - 1.00 mg/dL Final     Comment:     Use of this assay is not recommended for patients undergoing treatment with eltrombopag due to the potential  "for falsely elevated results.  N-acetyl-p-benzoquinone imine (metabolite of Acetaminophen) will generate erroneously low results in samples for patients that have taken an overdose of Acetaminophen.     Alkaline Phosphatase   Date Value Ref Range Status   10/21/2024 47 34 - 104 U/L Final   10/20/2024 45 34 - 104 U/L Final   10/03/2024 34 34 - 104 U/L Final     AST   Date Value Ref Range Status   10/21/2024 12 (L) 13 - 39 U/L Final   10/20/2024 12 (L) 13 - 39 U/L Final   10/03/2024 12 (L) 13 - 39 U/L Final     ALT   Date Value Ref Range Status   10/21/2024 12 7 - 52 U/L Final     Comment:     Specimen collection should occur prior to Sulfasalazine administration due to the potential for falsely depressed results.    10/20/2024 12 7 - 52 U/L Final     Comment:     Specimen collection should occur prior to Sulfasalazine administration due to the potential for falsely depressed results.    10/03/2024 12 7 - 52 U/L Final     Comment:     Specimen collection should occur prior to Sulfasalazine administration due to the potential for falsely depressed results.       LD   Date Value Ref Range Status   10/03/2024 461 (H) 140 - 271 U/L Final   09/13/2024 256 140 - 271 U/L Final   08/24/2024 207 140 - 271 U/L Final    No results found for: \"TSH\" No results found for: \"F1MREFW\"   Free T4   Date Value Ref Range Status   09/13/2024 0.92 0.61 - 1.12 ng/dL Final     Comment:     Specimens with biotin concentrations > 10 ng/mL can lead to significant (> 10%) positive bias in result.   08/24/2024 0.77 0.61 - 1.12 ng/dL Final     Comment:     Specimens with biotin concentrations > 10 ng/mL can lead to significant (> 10%) positive bias in result.      Patient was seen and discussed with attending physician, Carolina Taylor D.O.    Salome Underwood D.O.  Hematology-Oncology Fellow (PGY-5)   "

## 2024-10-24 NOTE — PHYSICAL THERAPY NOTE
PHYSICAL THERAPY NOTE          Patient Name: Ayla Nye  Today's Date: 10/24/2024       10/24/24 1013   PT Last Visit   PT Visit Date 10/24/24   Note Type   Note Type Treatment   Pain Assessment   Pain Assessment Tool 0-10   Pain Score 3   Pain Location/Orientation Orientation: Left  (ankle)   Restrictions/Precautions   Weight Bearing Precautions Per Order No   Other Precautions Cognitive;Chair Alarm;Bed Alarm;Multiple lines;Telemetry;Pain  (lung cx with mets to brain)   General   Chart Reviewed Yes   Response to Previous Treatment Patient with no complaints from previous session.   Family/Caregiver Present Yes  (daughter and ex-)   Cognition   Overall Cognitive Status   (please see OT notes)   Arousal/Participation Cooperative   Attention Within functional limits   Orientation Level Oriented X4   Memory Within functional limits   Following Commands Follows all commands and directions without difficulty   Comments pleasant to work with. very talkative and appropriate. defer to OT for cognition   Subjective   Subjective agreeable to participate   Bed Mobility   Supine to Sit 6  Modified independent   Additional items HOB elevated   Sit to Supine 6  Modified independent   Additional items HOB elevated   Transfers   Sit to Stand 6  Modified independent   Stand to Sit 6  Modified independent   Stand pivot 6  Modified independent   Additional Comments no AD   Ambulation/Elevation   Gait pattern WNL;Excessively slow   Gait Assistance 6  Modified independent   Assistive Device None   Distance 250'   Stair Management Assistance 5  Supervision   Stair Management Technique Step to pattern;One rail L;Foreward;Reciprocal   Number of Stairs 7   Ambulation/Elevation Additional Comments no LOB or unsteadiness noted with ambulation or turns. gait speed 0.73m/s which is greater than household safety cut off and within limited community  ambulation. FGA score 21/30 and deficits mainly to do with gait speed which pt reports is limited due to L ankle pain   Balance   Static Sitting Normal   Dynamic Sitting Good   Static Standing Fair +   Dynamic Standing Fair   Ambulatory Fair -   Endurance Deficit   Endurance Deficit No   Activity Tolerance   Activity Tolerance Patient tolerated treatment well   Medical Staff Made Aware CM for DC planning   Nurse Made Aware yes-cleared   Assessment   Prognosis Good   Problem List Impaired balance;Decreased cognition   Assessment Pt agreeable to participate in PT session. Pt performed functional mobility and therex as outlined above. She was able to complete ambulation, stair negotiation and balance assessment without fatigue and no overt LOB. Unsteadiness noted with tandem stance which is not atypical for age. Main limitations with FGA related to gait speed components of exam which she attributes to her L ankle pain. She interacted well with therapist with no aphasia or difficulty with memory. Per OT, mild cog deficits on MOCA and plans to perform ACLS on pt. At this time, she is physically functioning at a level suitable to return home safely. The patient's AM-PAC Basic Mobility Inpatient Short Form Raw Score is 23. A Raw score of greater than 16 suggests the patient may benefit from discharge to home. Please also refer to the recommendation of the Physical Therapist for safe discharge planning.  Updated pt's family on progress she has made physically and asked if there are any concerns with her returning home. Daughter visibly upset with pt potentially not going to rehab stating she has 20 medications to manage at home and she does not feel she is able to do it. Spoke about increasing family support and getting HHC or OPOT to assist pt in becoming independent with medication management or finding adaptive strategies as pt has a dx of metastatic cancer to brain and will likely need support going forward. Pt and  ex- appeared understanding to situation. Ex-, pt's brother, and pt's [employer] are all local and appear able to A as needed. Explained to daughter that PT is a component of tx team and not the end all. Can still try to see if she will qualify for [facility]. Unfortunately, [facility] reports she is too functional for rehab. At this time, would recommend pt has increased support at home for medications, driving (ex- drives her already at baseline), and any other household items identified by OT ACLS assessment. She can attend OPPT for focus on L ankle and higher level balance. Pt left supine in bed with bed alarm, call bell, phone, and all personal needs within reach. Pt has no further acute care PT needs as her mobility is at a level suitable to go home with no physical A required. defer to OT for cognition.   Barriers to Discharge None  (lives alone however has a very involved ex- who lives a mile away, a supportive brother and sister in law)   Goals   Patient Goals less ankle pain   PT Treatment Day 1   Plan   Progress Discontinue PT  (recommend pt continues to mobilize with restorative and nursing staff)   Discharge Recommendation   Rehab Resource Intensity Level, PT (S)  III (Minimum Resource Intensity)   Additional Comments [facility] reports pt is too functional to be approved. recommend increased family support for medication management as that is their primary concern. OT to do ACLS to determine other areas pt may need A with. At this time, OT is recommending OPOT   AM-PAC Basic Mobility Inpatient   Turning in Flat Bed Without Bedrails 4   Lying on Back to Sitting on Edge of Flat Bed Without Bedrails 4   Moving Bed to Chair 4   Standing Up From Chair Using Arms 4   Walk in Room 4   Climb 3-5 Stairs With Railing 3   Basic Mobility Inpatient Raw Score 23   Basic Mobility Standardized Score 50.88   [facility] Highest Level Of Mobility   -HLM Goal 7: Walk 25 feet or more   -HLM Achieved 8: Walk 250 feet  ot more   [place], PT, DPT, NCS

## 2024-10-24 NOTE — PLAN OF CARE
Problem: PHYSICAL THERAPY ADULT  Goal: Performs mobility at highest level of function for planned discharge setting.  See evaluation for individualized goals.  Description: Treatment/Interventions: ADL retraining, Functional transfer training, Endurance training, Bed mobility, Gait training, Spoke to nursing, OT          See flowsheet documentation for full assessment, interventions and recommendations.  Outcome: Adequate for Discharge  Note: Prognosis: Good  Problem List: Impaired balance, Decreased cognition  Assessment: Pt agreeable to participate in PT session. Pt performed functional mobility and therex as outlined above. She was able to complete ambulation, stair negotiation and balance assessment without fatigue and no LOB. Unsteadiness noted with tandem stance which is not atypical for age. Main limitations with FGA related to gait speed components of exam which she attributes to her L ankle pain. She interacted well with therapist with no aphasia or difficulty with memory. Per OT, mild cog deficits on MOCA and plans to perform ACLS on pt. At this time, she is physically functioning at a level suitable to return home safely. The patient's AM-PAC Basic Mobility Inpatient Short Form Raw Score is 23. A Raw score of greater than 16 suggests the patient may benefit from discharge to home. Please also refer to the recommendation of the Physical Therapist for safe discharge planning.  Updated pt's family on progress she has made physically and asked if there are any concerns with her returning home. Daughter visibly upset with pt potentially not going to rehab stating she has 20 medications to manage at home and she does not feel she is able to do it. Spoke about increasing family support and getting HHC or OPOT to assist pt in becoming independent with medication management or finding adaptive strategies as pt has a dx of metastatic cancer to brain and will likely need support going forward. Pt and ex-  appeared understanding to situation. Ex-, pt's brother, and pt's [employer] are all local and appear able to A as needed. Explained to daughter that PT is a component of tx team and not the end all. Can still try to see if she will qualify for [facility]. Unfortunately, [facility] reports she is too functional for rehab. At this time, would recommend pt has increased support at home for medications, driving (ex- drives her already at baseline), and any other household items identified by OT ACLS assessment. She can attend OPPT for focus on L ankle and higher level balance. Pt left supine in bed with bed alarm, call bell, phone, and all personal needs within reach. Pt has no further acute care PT needs as her mobility is at a level suitable to go home with no physical A required. defer to OT for cognition.  Barriers to Discharge: None (lives alone however has a very involved ex- who lives a mile away, a supportive brother and sister in law)     Rehab Resource Intensity Level, PT: (S) III (Minimum Resource Intensity)    See flowsheet documentation for full assessment.

## 2024-10-24 NOTE — PROGRESS NOTES
Progress Note - Internal Medicine   Name: Ayla Nye 73 y.o. female I MRN: [id number]  [facility] I Date of Admission: 10/21/2024   Date of Service: 10/24/2024 I Hospital Day: 3    Assessment & Plan  Metastatic cancer to brain (HCC)  Patient with history of non-small cell lung adenocarcinoma diagnosed in March 2024.  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  She completed SRS for these metastatic lesions in August 2024.  She is on Decadron in the outpatient setting and has had multiple admissions due to episodes of altered mental status with increased vasogenic edema after attempting to taper Decadron dosing.  She presented to [facility] on 10/20 with worsening confusion and a stroke alert was called.  Imaging showed no acute findings.  She was loaded with Keppra, started on Decadron, and transferred to [place] for video EEG and MRI brain under neurocritical care for close monitoring.  The morning of 10/21 patient's mental status reportedly much improved and she was deemed stable for transfer out of the ICU.    Symptoms likely in the setting of Decadron taper    Of note, patient is not exactly sure what her current Decadron dosing is.  With that said, it appears that she had been on a Decadron taper throughout September starting at 8 mg daily that was reduced each week.  Her most recent orders show Decadron 1 mg daily from 10/17-10/24 and then 0.5 mg daily for 8 more days    vEEG update as of today: 26 hours of recording. No seizures. Continues to have left hemisphere slowing, left posterior quadrant maximal. There are also left posterior temporal / parietal poorly formed (blunt and at times sharply contoured) LPDs occurring at 0.5-1 Hz. LPDs indicate high seizure risk and are often associated with acute/subacute destructive neuronal injury. Right hemisphere is essentially normal.     Blood cultures no growth at 48 hours    Plan:  - MRI  10/21 show stable vasogenic edema in the left parietotemporal region; previously demonstrated left posterior parietal lesion as well visualized likely due to differences in technique.  No new metastasis, no new acute infarct or hemorrhage.  - Start VTE prophylaxis with Lovenox  today  - Neurosurgery, neurology, and palliative care consulted.  Appreciate assistance  - Per neurosurgery, no emergent neurosurgical intervention warranted  - Per neurology, continue Keppra 100 mg BID , okay to discharge with this dosage.  Also recommended Plavix or aspirin given right monocular inferior temporal vision for impairment at high risk of developing CV A. Follow-up with epilepsy clinic in 4 to 6 weeks  - Monitor on video EEG  - Transition to Decadron 4 mg every 6 hours starting today until 10/30 followed by 2 mg BID on 10/31 to 11/1, and 2 mg on 11/2 to 11/11. Patient should have enough doses until her heme-onc appointment on 11/5/24  - Continue monitoring glucose given on dexamethasone  - SBP goal <160  - Stat CT head with any neurological decline for return of altered mental status  - Neurochecks every 4 hours  - Seizure precautions in place  - PT/OT recommended level 1   - Waiting for ARC  Hypertension  Documented history of hypertension but not on any antihypertensives at home.  Blood pressure is acceptable this admission  Patient and chart review, patient usually taking multipin 5 mg and losartan 50 mg daily before being discontinued in June 2024    BP overnight high 140s/80s; elevated BP most likely due to steroids and some component of pain as well.     Plan:  - Continue amlodipine 5 mg    Mixed hyperlipidemia  Lipid panel 7/20/2024: , TG 76, HDL 60,   ASCVD of 18.4%.  Not on any statin therapy per chart review    Plan:  - Continue Lipitor 20 mg daily  Malignant neoplasm of upper lobe, right bronchus or lung (HCC)  Patient with a history of non-small cell lung cancer of the right upper lobe with metastasis to  the brain diagnosed in March 2024.  She was on weekly carboplatin and paclitaxel with radiation from 5/23/2024 - 7/5/2024.  Further imaging after treatment showed signs concerning of recurrent metastatic NSCLC now with possible metastasis to the pelvis.  For this, it was recommended that she undergo systemic chemoimmunotherapy with carboplatin, pemetrexed, and pembrolizumab.  Her first cycle was on 10/7/2024.    Plan:  - Patient following closely with oncology in the outpatient setting  - Remainder of care for active concerns as mentioned above  - CT chest abdomen and pelvis revealed new small bilateral pulmonary nodules, likely metastasis, new right renal lesion(s) concerning for metastasis, significant interval enlargement of necrotic appearing mass in the right ischiorectal fossa, likely metastasis, deceasing RUL necrotic mass with stable and/or decreasing satellite nodularity, improved mediastinal and right hilar adenopathy, stable 2.4 cm CHUYITA groundglass density.  - Patient will f/u with heme-onc on 11/5/24    Hypothyroidism  Continue PTA levothyroxine 50 mcg daily  Palliative care patient  Palliative care following  Given GFR less than 60, as needed Toradol was discontinued today    Plan:  - Palliative following. Change Tylenol from every 8 hours to 650 mg every 6 hours  - Patient would like to avoid opioids and gabapentin if possible    Disposition: Pending ARC    Team: SOD TEAM C    Subjective   Patient seen and examined. No acute events overnight.  Patient states she had a decent sleep last night but feels much improved than first day. She had a bowel movement yesterday. She is aware of the CT C/A/P findings of metastasis. Denies headache, dizziness, visual changes, chest pain discomfort, abdominal pain, and urinary symptoms.    Objective :  Temp:  [97.7 °F (36.5 °C)-97.9 °F (36.6 °C)] 97.7 °F (36.5 °C)  HR:  [63-64] 63  BP: (145-150)/(71-88) 147/82  Resp:  [16-18] 16  SpO2:  [96 %] 96 %  O2 Device: None  (Room air)    I/O         10/20 0701  10/21 0700 10/21 0701  10/22 0700    I.V. (mL/kg) 146.3 442.5 (8.5)    IV Piggyback  50    Total Intake(mL/kg) 146.3 492.5 (9.4)    Urine (mL/kg/hr) 650 1165 (0.9)    Total Output 650 1165    Net -503.8 -672.5          Unmeasured Urine Occurrence  1 x          Weights:        Body mass index is 20.12 kg/m².  Weight (last 2 days)       Date/Time Weight    10/24/24 0600 49.9 (110.01)    10/23/24 0600 50.4 (111.11)    10/22/24 0559 52.3 (115.3)            Physical Exam  Vitals and nursing note reviewed.   Constitutional:       General: She is not in acute distress.     Appearance: She is well-developed.   HENT:      Head: Normocephalic and atraumatic.   Eyes:      General: No scleral icterus.     Extraocular Movements: Extraocular movements intact.      Conjunctiva/sclera: Conjunctivae normal.   Cardiovascular:      Rate and Rhythm: Normal rate and regular rhythm.      Pulses: Normal pulses.      Heart sounds: Normal heart sounds. No murmur heard.  Pulmonary:      Effort: Pulmonary effort is normal. No respiratory distress.      Breath sounds: Normal breath sounds.   Abdominal:      General: Bowel sounds are normal.      Palpations: Abdomen is soft.      Tenderness: There is no abdominal tenderness.   Musculoskeletal:         General: No swelling.      Cervical back: Neck supple.      Right lower leg: No edema.      Left lower leg: No edema.   Skin:     General: Skin is warm and dry.      Capillary Refill: Capillary refill takes less than 2 seconds.      Findings: No bruising or lesion.   Neurological:      General: No focal deficit present.      Mental Status: She is alert.      Cranial Nerves: Cranial nerves 2-12 are intact.   Psychiatric:         Mood and Affect: Mood normal.           Lab Results: I have reviewed the following results:  Recent Labs     10/22/24  0520 10/23/24  0443 10/24/24  0508   WBC 4.67   < > 6.92   HGB 9.3*   < > 9.8*   HCT 28.5*   < > 30.6*      <  > 330   BANDSPCT 1  --   --    SODIUM 140   < > 142   K 4.1   < > 3.9      < > 105   CO2 27   < > 24   BUN 25   < > 24   CREATININE 1.00   < > 0.91   GLUC 142*   < > 127    < > = values in this interval not displayed.       Imaging Results Review: I personally reviewed the following image studies in PACS and associated radiology reports: chest xray, CT head, CT A neck and head, and MRI brain. My interpretation of the radiology images/reports is:  .  Other Study Results Review: EKG was reviewed.     Currently Ordered Meds:   Current Facility-Administered Medications:     acetaminophen (TYLENOL) tablet 650 mg, Q6H KRISS    amLODIPine (NORVASC) tablet 5 mg, Daily    atorvastatin (LIPITOR) tablet 20 mg, Daily With Dinner    dexamethasone (DECADRON) tablet 4 mg, Q6H KRISS **FOLLOWED BY** [START ON 10/28/2024] dexamethasone (DECADRON) tablet 4 mg, Q12H KRISS **FOLLOWED BY** [START ON 10/30/2024] dexamethasone (DECADRON) tablet 2 mg, Q12H KRISS **FOLLOWED BY** [START ON 11/2/2024] dexamethasone (DECADRON) tablet 2 mg, Daily    enoxaparin (LOVENOX) subcutaneous injection 30 mg, Q24H KRISS    levETIRAcetam (KEPPRA) injection 1,000 mg, Q12H KRISS    levothyroxine tablet 50 mcg, Early Morning    melatonin tablet 6 mg, HS PRN    ondansetron (ZOFRAN) injection 4 mg, Q4H PRN    pantoprazole (PROTONIX) EC tablet 40 mg, Early Morning    senna-docusate sodium (SENOKOT S) 8.6-50 mg per tablet 2 tablet, BID    Facility-Administered Medications Ordered in Other Encounters:     fentaNYL injection, PRN    midazolam (VERSED) injection, PRN  VTE Pharmacologic Prophylaxis: Sequential compression device (Venodyne)  and Enoxaparin (Lovenox)  VTE Mechanical Prophylaxis: sequential compression device    Administrative Statements   I have spent a total time of 45 minutes in caring for this patient on the day of the visit/encounter including Diagnostic results, Prognosis, Patient and family education, Importance of tx compliance, Counseling /  Coordination of care, Documenting in the medical record, Reviewing / ordering tests, medicine, procedures  , and Obtaining or reviewing history  .  Portions of the record may have been created with voice recognition software.

## 2024-10-24 NOTE — RESTORATIVE TECHNICIAN NOTE
Restorative Technician Note      Patient Name: Ayla Nye     Note Type: Mobility  Patient Position Upon Consult: Supine  Activity Performed: Ambulated; Dangled; Stood  Assistive Device: Other (Comment)  Education Provided: Yes  Patient Position at End of Consult: Supine; All needs within reach; Bed/Chair alarm activated    Shane MCRAE, Restorative Technician,

## 2024-10-24 NOTE — PROGRESS NOTES
Patient:    MRN:  [id number]    [place] Request ID:  [id number]    Level of care reserved:    Partner Reserved:    Clinical needs requested:    Geography searched:  10 miles around [id number]    Start of Service:    Request sent:  10:08am EDT on 10/23/2024 by Rina Quezada    Partner reserved:  by Rina Quezada    Choice list shared:  9:53am EDT on 10/24/2024 by Rina Quezada

## 2024-10-24 NOTE — ASSESSMENT & PLAN NOTE
Palliative Diagnosis: Recurrent metastatic non-small cell lung cancer with intracranial metastasis.    Goals:   Continue full-cares, including cancer-directed therapy.  Palliative Care will continue to follow for symptom-management.    Social Support:  Supportive listening provided.  Normalized experience of patient.  Provided anxiety containment.  Advocated for patient/family with interdisciplinary team.  Mediated conflict.    Care Coordination:  Case discussed with patient, and Radiation Oncology.    Follow-Up:  Palliative Care to continue to follow for symptom-management.  Please do not hesitate to contact our on-call provider through EP[employer] Secure Chat or contact [phone] should there be an acute change or other symptom control concerns.

## 2024-10-24 NOTE — ASSESSMENT & PLAN NOTE
Documented history of hypertension but not on any antihypertensives at home.  Blood pressure is acceptable this admission  Patient and chart review, patient usually taking multipin 5 mg and losartan 50 mg daily before being discontinued in June 2024    BP overnight high 140s/80s; elevated BP most likely due to steroids and some component of pain as well.     Plan:  - Continue amlodipine 5 mg

## 2024-10-24 NOTE — PROGRESS NOTES
Progress Note - Palliative Care   Name: Ayla Nye 73 y.o. female I MRN: [id number]  Unit/Bed#: [facility] 726-01 I Date of Admission: 10/21/2024   Date of Service: 10/24/2024 I Hospital Day: 3     Assessment & Plan  Metastatic cancer to brain (HCC)  Continue full-cares, including cancer-directed therapy:  Discussed case with Radiation Oncology. Recommend Dexamethasone taper to a baseline (e.g. Minimum-dose) of 2 mg Daily on hospital discharge.  Recommend outpatient follow-up with Medical Oncology, and Radiation Oncology, as scheduled.  Note: Patient has scheduled follow-up with Palliative Care on October 31st, 2024 (Home Visit).  Hypertension    Mixed hyperlipidemia    Malignant neoplasm of upper lobe, right bronchus or lung (HCC)    Hypothyroidism    Palliative care patient  Palliative Diagnosis: Recurrent metastatic non-small cell lung cancer with intracranial metastasis.    Goals:   Continue full-cares, including cancer-directed therapy.  Palliative Care will continue to follow for symptom-management.    Social Support:  Supportive listening provided.  Normalized experience of patient.  Provided anxiety containment.  Advocated for patient/family with interdisciplinary team.  Mediated conflict.    Care Coordination:  Case discussed with patient, and Radiation Oncology.    Follow-Up:  Palliative Care to continue to follow for symptom-management.  Please do not hesitate to contact our on-call provider through EPIC Secure Chat or contact [phone] should there be an acute change or other symptom control concerns.    Acute encephalopathy  Resolving.  Cancer associated pain  Continue current pain-regimen:  Continue Acetaminophen 650 mg q6HR (Scheduled).  Internal Medicine initiated Gabapentin 100 mg Bedtime, at patient request.  Note: Ketorolac was discontinued. Consider re-initiation of non-steroidal anti-inflammatory agent for severe-pain refractory to Acetaminophen.  Failed-therapies:  Patient requesting  avoidance of Opiates; however, is now open to re-trial of Gabapentin.    Subjective:  Interval History: Patient was seen and examined on the General Medical floor. Resting comfortably in bed, on my initial arrival. No acute events noted, overnight. Patient endorses improved pain-control with continuation of Acetaminophen 650 mg Q6HR (Scheduled); however, notes breakthrough pain with severity reaching upwards of 5/10 on the pain-scale. She remains resistant to addition of opiates to her current pain-regimen; although is open to use of a non-steroidal anti-inflammatory agent if necessary for severe pain. Of note, I was contacted by Internal Medicine regarding patient request to re-trial Gabapentin. They have since ordered Gabapentin 100 mg Bedtime. Of note, patient was evaluated by Physical Medicine and Rehabilitation team for consideration of transfer to the acute rehabilitation unit. Definitive recommendations are pending at this time; however, patient remains hopeful to participate in a more intensive physical therapy program. Otherwise, she has no further complaints or concerns. Addressed all questions, including plan for discharge on Dexamethasone. Per conversation with Radiation Oncology, will plan to taper to a minimum-dose of Dexamethasone 2 mg Daily (e.g. Will not taper below the above-mentioned). Patient is in agreement.     Review of Systems:  All systems reviewed and negative except otherwise listed in the History of Present Illness.    Objective:  Vitals:    10/23/24 2026   BP: 147/82   Pulse: 63   Resp: 16   Temp: 97.7 °F (36.5 °C)   SpO2: 96%     Physical Exam:   General: Alert, and oriented; Pleasant, and conversational; Overall, Well-Appearing  HEENT: Atraumatic, and normocephalic; PERRLA; EOMI; Moist mucosal membranes  Respiratory: Normal work of breathing; No supplemental-oxygen requirement  Extremities: No obvious deformities; No peripheral edema; Moves all  Neurologic: Appropriately alert, and  oriented to Person, Place, and Time    WBC   Date Value Ref Range Status   10/24/2024 6.92 4.31 - 10.16 Thousand/uL Final   10/23/2024 5.09 4.31 - 10.16 Thousand/uL Final   10/22/2024 4.67 4.31 - 10.16 Thousand/uL Final     Hemoglobin   Date Value Ref Range Status   10/24/2024 9.8 (L) 11.5 - 15.4 g/dL Final   10/23/2024 9.5 (L) 11.5 - 15.4 g/dL Final   10/22/2024 9.3 (L) 11.5 - 15.4 g/dL Final     Platelets   Date Value Ref Range Status   10/24/2024 330 149 - 390 Thousands/uL Final   10/23/2024 262 149 - 390 Thousands/uL Final   10/22/2024 250 149 - 390 Thousands/uL Final     MCV   Date Value Ref Range Status   10/24/2024 103 (H) 82 - 98 fL Final   10/23/2024 99 (H) 82 - 98 fL Final   10/22/2024 99 (H) 82 - 98 fL Final      Potassium   Date Value Ref Range Status   10/24/2024 3.9 3.5 - 5.3 mmol/L Final   10/23/2024 4.3 3.5 - 5.3 mmol/L Final   10/22/2024 4.1 3.5 - 5.3 mmol/L Final     Chloride   Date Value Ref Range Status   10/24/2024 105 96 - 108 mmol/L Final   10/23/2024 105 96 - 108 mmol/L Final   10/22/2024 103 96 - 108 mmol/L Final     CO2   Date Value Ref Range Status   10/24/2024 24 21 - 32 mmol/L Final   10/23/2024 26 21 - 32 mmol/L Final   10/22/2024 27 21 - 32 mmol/L Final     BUN   Date Value Ref Range Status   10/24/2024 24 5 - 25 mg/dL Final   10/23/2024 22 5 - 25 mg/dL Final   10/22/2024 25 5 - 25 mg/dL Final     Creatinine   Date Value Ref Range Status   10/24/2024 0.91 0.60 - 1.30 mg/dL Final     Comment:     Standardized to IDMS reference method   10/23/2024 0.92 0.60 - 1.30 mg/dL Final     Comment:     Standardized to IDMS reference method   10/22/2024 1.00 0.60 - 1.30 mg/dL Final     Comment:     Standardized to IDMS reference method     Glucose   Date Value Ref Range Status   10/24/2024 127 65 - 140 mg/dL Final     Comment:     If the patient is fasting, the ADA then defines impaired fasting glucose as > 100 mg/dL and diabetes as > or equal to 123 mg/dL.   10/23/2024 137 65 - 140 mg/dL Final      Comment:     If the patient is fasting, the ADA then defines impaired fasting glucose as > 100 mg/dL and diabetes as > or equal to 123 mg/dL.   10/22/2024 142 (H) 65 - 140 mg/dL Final     Comment:     If the patient is fasting, the ADA then defines impaired fasting glucose as > 100 mg/dL and diabetes as > or equal to 123 mg/dL.     Calcium   Date Value Ref Range Status   10/24/2024 8.9 8.4 - 10.2 mg/dL Final   10/23/2024 9.0 8.4 - 10.2 mg/dL Final   10/22/2024 9.1 8.4 - 10.2 mg/dL Final     Albumin   Date Value Ref Range Status   10/21/2024 3.9 3.5 - 5.0 g/dL Final   10/20/2024 3.8 3.5 - 5.0 g/dL Final   10/03/2024 3.7 3.5 - 5.0 g/dL Final     Total Bilirubin   Date Value Ref Range Status   10/21/2024 0.27 0.20 - 1.00 mg/dL Final     Comment:     Use of this assay is not recommended for patients undergoing treatment with eltrombopag due to the potential for falsely elevated results.  N-acetyl-p-benzoquinone imine (metabolite of Acetaminophen) will generate erroneously low results in samples for patients that have taken an overdose of Acetaminophen.   10/20/2024 0.25 0.20 - 1.00 mg/dL Final     Comment:     Use of this assay is not recommended for patients undergoing treatment with eltrombopag due to the potential for falsely elevated results.  N-acetyl-p-benzoquinone imine (metabolite of Acetaminophen) will generate erroneously low results in samples for patients that have taken an overdose of Acetaminophen.   10/03/2024 0.30 0.20 - 1.00 mg/dL Final     Comment:     Use of this assay is not recommended for patients undergoing treatment with eltrombopag due to the potential for falsely elevated results.  N-acetyl-p-benzoquinone imine (metabolite of Acetaminophen) will generate erroneously low results in samples for patients that have taken an overdose of Acetaminophen.     Alkaline Phosphatase   Date Value Ref Range Status   10/21/2024 47 34 - 104 U/L Final   10/20/2024 45 34 - 104 U/L Final   10/03/2024 34 34 - 104  "U/L Final     AST   Date Value Ref Range Status   10/21/2024 12 (L) 13 - 39 U/L Final   10/20/2024 12 (L) 13 - 39 U/L Final   10/03/2024 12 (L) 13 - 39 U/L Final     ALT   Date Value Ref Range Status   10/21/2024 12 7 - 52 U/L Final     Comment:     Specimen collection should occur prior to Sulfasalazine administration due to the potential for falsely depressed results.    10/20/2024 12 7 - 52 U/L Final     Comment:     Specimen collection should occur prior to Sulfasalazine administration due to the potential for falsely depressed results.    10/03/2024 12 7 - 52 U/L Final     Comment:     Specimen collection should occur prior to Sulfasalazine administration due to the potential for falsely depressed results.       LD   Date Value Ref Range Status   10/03/2024 461 (H) 140 - 271 U/L Final   09/13/2024 256 140 - 271 U/L Final   08/24/2024 207 140 - 271 U/L Final    No results found for: \"TSH\" No results found for: \"R1LDHOQ\"   Free T4   Date Value Ref Range Status   09/13/2024 0.92 0.61 - 1.12 ng/dL Final     Comment:     Specimens with biotin concentrations > 10 ng/mL can lead to significant (> 10%) positive bias in result.   08/24/2024 0.77 0.61 - 1.12 ng/dL Final     Comment:     Specimens with biotin concentrations > 10 ng/mL can lead to significant (> 10%) positive bias in result.      Patient was seen and discussed with attending physician, Carolina Taylor D.O.    Salome Underwood D.O.  Hematology-Oncology Fellow (PGY-5)     "

## 2024-10-24 NOTE — ARC ADMISSION
Reviewed updates with ARC interdisciplinary team - unfortunately patient has progressed with therapy and is now too functional for ARC. Patient is now recommended for TCF vs SNF/subacute rehab. CM has been updated. Please notify with any changes.

## 2024-10-24 NOTE — PLAN OF CARE
Problem: PAIN - ADULT  Goal: Verbalizes/displays adequate comfort level or baseline comfort level  Description: Interventions:  - Encourage patient to monitor pain and request assistance  - Assess pain using appropriate pain scale  - Administer analgesics based on type and severity of pain and evaluate response  - Implement non-pharmacological measures as appropriate and evaluate response  - Consider cultural and social influences on pain and pain management  - Notify physician/advanced practitioner if interventions unsuccessful or patient reports new pain  Outcome: Progressing     Problem: INFECTION - ADULT  Goal: Absence or prevention of progression during hospitalization  Description: INTERVENTIONS:  - Assess and monitor for signs and symptoms of infection  - Monitor lab/diagnostic results  - Monitor all insertion sites, i.e. indwelling lines, tubes, and drains  - Monitor endotracheal if appropriate and nasal secretions for changes in amount and color  - Petros appropriate cooling/warming therapies per order  - Administer medications as ordered  - Instruct and encourage patient and family to use good hand hygiene technique  - Identify and instruct in appropriate isolation precautions for identified infection/condition  Outcome: Progressing  Goal: Absence of fever/infection during neutropenic period  Description: INTERVENTIONS:  - Monitor WBC    Outcome: Progressing     Problem: SAFETY ADULT  Goal: Patient will remain free of falls  Description: INTERVENTIONS:  - Educate patient/family on patient safety including physical limitations  - Instruct patient to call for assistance with activity   - Consult OT/PT to assist with strengthening/mobility   - Keep Call bell within reach  - Keep bed low and locked with side rails adjusted as appropriate  - Keep care items and personal belongings within reach  - Initiate and maintain comfort rounds  - Make Fall Risk Sign visible to staff  - Offer Toileting every 2 Hours,  in advance of need  - Initiate/Maintain bed alarm  - Apply yellow socks and bracelet for high fall risk patients  - Consider moving patient to room near nurses station  Outcome: Progressing  Goal: Maintain or return to baseline ADL function  Description: INTERVENTIONS:  -  Assess patient's ability to carry out ADLs; assess patient's baseline for ADL function and identify physical deficits which impact ability to perform ADLs (bathing, care of mouth/teeth, toileting, grooming, dressing, etc.)  - Assess/evaluate cause of self-care deficits   - Assess range of motion  - Assess patient's mobility; develop plan if impaired  - Assess patient's need for assistive devices and provide as appropriate  - Encourage maximum independence but intervene and supervise when necessary  - Involve family in performance of ADLs  - Assess for home care needs following discharge   - Consider OT consult to assist with ADL evaluation and planning for discharge  - Provide patient education as appropriate  Outcome: Progressing  Goal: Maintains/Returns to pre admission functional level  Description: INTERVENTIONS:  - Perform AM-PAC 6 Click Basic Mobility/ Daily Activity assessment daily.  - Set and communicate daily mobility goal to care team and patient/family/caregiver.   - Collaborate with rehabilitation services on mobility goals if consulted  - Perform Range of Motion 3 times a day.  - Reposition patient every 2 hours.  - Dangle patient 3 times a day  - Stand patient 2 times a day  - Ambulate patient 3 times a day  - Out of bed to chair 2 times a day   - Out of bed for meals 3 times a day  - Out of bed for toileting  - Record patient progress and toleration of activity level   Outcome: Progressing     Problem: DISCHARGE PLANNING  Goal: Discharge to home or other facility with appropriate resources  Description: INTERVENTIONS:  - Identify barriers to discharge w/patient and caregiver  - Arrange for needed discharge resources and  transportation as appropriate  - Identify discharge learning needs (meds, wound care, etc.)  - Arrange for interpretive services to assist at discharge as needed  - Refer to Case Management Department for coordinating discharge planning if the patient needs post-hospital services based on physician/advanced practitioner order or complex needs related to functional status, cognitive ability, or social support system  Outcome: Progressing     Problem: Knowledge Deficit  Goal: Patient/family/caregiver demonstrates understanding of disease process, treatment plan, medications, and discharge instructions  Description: Complete learning assessment and assess knowledge base.  Interventions:  - Provide teaching at level of understanding  - Provide teaching via preferred learning methods  Outcome: Progressing

## 2024-10-25 ENCOUNTER — APPOINTMENT (OUTPATIENT)
Dept: RADIATION ONCOLOGY | Facility: HOSPITAL | Age: 74
End: 2024-10-25
Attending: INTERNAL MEDICINE
Payer: MEDICARE

## 2024-10-25 ENCOUNTER — APPOINTMENT (OUTPATIENT)
Dept: RADIATION ONCOLOGY | Facility: HOSPITAL | Age: 74
End: 2024-10-25
Payer: MEDICARE

## 2024-10-25 PROBLEM — D64.9 ANEMIA: Status: ACTIVE | Noted: 2024-10-25

## 2024-10-25 LAB
ANION GAP SERPL CALCULATED.3IONS-SCNC: 8 MMOL/L (ref 4–13)
BUN SERPL-MCNC: 24 MG/DL (ref 5–25)
CALCIUM SERPL-MCNC: 8.8 MG/DL (ref 8.4–10.2)
CHLORIDE SERPL-SCNC: 107 MMOL/L (ref 96–108)
CO2 SERPL-SCNC: 25 MMOL/L (ref 21–32)
CREAT SERPL-MCNC: 0.85 MG/DL (ref 0.6–1.3)
ERYTHROCYTE [DISTWIDTH] IN BLOOD BY AUTOMATED COUNT: 13.6 % (ref 11.6–15.1)
GFR SERPL CREATININE-BSD FRML MDRD: 68 ML/MIN/1.73SQ M
GLUCOSE SERPL-MCNC: 112 MG/DL (ref 65–140)
HCT VFR BLD AUTO: 29.5 % (ref 34.8–46.1)
HGB BLD-MCNC: 9.5 G/DL (ref 11.5–15.4)
MCH RBC QN AUTO: 32.1 PG (ref 26.8–34.3)
MCHC RBC AUTO-ENTMCNC: 32.2 G/DL (ref 31.4–37.4)
MCV RBC AUTO: 100 FL (ref 82–98)
PLATELET # BLD AUTO: 309 THOUSANDS/UL (ref 149–390)
PMV BLD AUTO: 8.5 FL (ref 8.9–12.7)
POTASSIUM SERPL-SCNC: 4.2 MMOL/L (ref 3.5–5.3)
RBC # BLD AUTO: 2.96 MILLION/UL (ref 3.81–5.12)
SODIUM SERPL-SCNC: 140 MMOL/L (ref 135–147)
WBC # BLD AUTO: 7.84 THOUSAND/UL (ref 4.31–10.16)

## 2024-10-25 PROCEDURE — 80048 BASIC METABOLIC PNL TOTAL CA: CPT

## 2024-10-25 PROCEDURE — 97129 THER IVNTJ 1ST 15 MIN: CPT

## 2024-10-25 PROCEDURE — 99232 SBSQ HOSP IP/OBS MODERATE 35: CPT | Performed by: INTERNAL MEDICINE

## 2024-10-25 PROCEDURE — 97535 SELF CARE MNGMENT TRAINING: CPT

## 2024-10-25 PROCEDURE — 85027 COMPLETE CBC AUTOMATED: CPT

## 2024-10-25 RX ORDER — DEXAMETHASONE 4 MG/1
4 TABLET ORAL EVERY 12 HOURS SCHEDULED
Status: DISCONTINUED | OUTPATIENT
Start: 2024-10-29 | End: 2024-10-25

## 2024-10-25 RX ORDER — DEXAMETHASONE 4 MG/1
4 TABLET ORAL EVERY 6 HOURS SCHEDULED
Status: DISCONTINUED | OUTPATIENT
Start: 2024-10-25 | End: 2024-10-25

## 2024-10-25 RX ORDER — DEXAMETHASONE 4 MG/1
4 TABLET ORAL EVERY 12 HOURS SCHEDULED
Status: COMPLETED | OUTPATIENT
Start: 2024-10-29 | End: 2024-11-02

## 2024-10-25 RX ORDER — DEXAMETHASONE 2 MG/1
2 TABLET ORAL DAILY
Status: DISCONTINUED | OUTPATIENT
Start: 2024-11-08 | End: 2024-11-03

## 2024-10-25 RX ORDER — DEXAMETHASONE 2 MG/1
2 TABLET ORAL EVERY 12 HOURS SCHEDULED
Status: DISCONTINUED | OUTPATIENT
Start: 2024-11-03 | End: 2024-10-25

## 2024-10-25 RX ORDER — DEXAMETHASONE 4 MG/1
4 TABLET ORAL EVERY 6 HOURS SCHEDULED
Status: COMPLETED | OUTPATIENT
Start: 2024-10-25 | End: 2024-10-28

## 2024-10-25 RX ORDER — GABAPENTIN 300 MG/1
300 CAPSULE ORAL
Status: DISCONTINUED | OUTPATIENT
Start: 2024-10-25 | End: 2024-11-04 | Stop reason: HOSPADM

## 2024-10-25 RX ORDER — DEXAMETHASONE 2 MG/1
2 TABLET ORAL DAILY
Status: DISCONTINUED | OUTPATIENT
Start: 2024-11-08 | End: 2024-10-25

## 2024-10-25 RX ORDER — GABAPENTIN 100 MG/1
200 CAPSULE ORAL
Status: DISCONTINUED | OUTPATIENT
Start: 2024-10-25 | End: 2024-10-25

## 2024-10-25 RX ORDER — DEXAMETHASONE 2 MG/1
2 TABLET ORAL EVERY 12 HOURS SCHEDULED
Status: DISCONTINUED | OUTPATIENT
Start: 2024-11-03 | End: 2024-11-03

## 2024-10-25 RX ADMIN — ACETAMINOPHEN 650 MG: 325 TABLET, FILM COATED ORAL at 12:00

## 2024-10-25 RX ADMIN — GABAPENTIN 300 MG: 300 CAPSULE ORAL at 21:10

## 2024-10-25 RX ADMIN — DEXAMETHASONE 4 MG: 4 TABLET ORAL at 12:00

## 2024-10-25 RX ADMIN — LEVOTHYROXINE SODIUM 50 MCG: 50 TABLET ORAL at 05:24

## 2024-10-25 RX ADMIN — DEXAMETHASONE 4 MG: 4 TABLET ORAL at 17:11

## 2024-10-25 RX ADMIN — AMLODIPINE BESYLATE 5 MG: 5 TABLET ORAL at 08:34

## 2024-10-25 RX ADMIN — ATORVASTATIN CALCIUM 20 MG: 20 TABLET, FILM COATED ORAL at 17:09

## 2024-10-25 RX ADMIN — PANTOPRAZOLE SODIUM 40 MG: 40 TABLET, DELAYED RELEASE ORAL at 05:24

## 2024-10-25 RX ADMIN — DEXAMETHASONE 4 MG: 4 TABLET ORAL at 05:24

## 2024-10-25 RX ADMIN — ACETAMINOPHEN 650 MG: 325 TABLET, FILM COATED ORAL at 05:24

## 2024-10-25 RX ADMIN — ACETAMINOPHEN 650 MG: 325 TABLET, FILM COATED ORAL at 17:09

## 2024-10-25 RX ADMIN — LEVETIRACETAM 1000 MG: 100 INJECTION, SOLUTION INTRAVENOUS at 21:10

## 2024-10-25 RX ADMIN — LEVETIRACETAM 1000 MG: 100 INJECTION, SOLUTION INTRAVENOUS at 08:34

## 2024-10-25 NOTE — OCCUPATIONAL THERAPY NOTE
Occupational Therapy Progress Note     Patient Name: Ayla Nye  Today's Date: 10/25/2024  Problem List  Principal Problem:    Metastatic cancer to brain (HCC)  Active Problems:    Hypertension    Mixed hyperlipidemia    Malignant neoplasm of upper lobe, right bronchus or lung (HCC)    Hypothyroidism    Cancer associated pain    Palliative care patient    Acute encephalopathy    Anemia        10/25/24 1101   OT Last Visit   OT Visit Date 10/25/24   Note Type   Note Type Treatment   Pain Assessment   Pain Assessment Tool 0-10   Pain Score No Pain   Restrictions/Precautions   Weight Bearing Precautions Per Order No   Other Precautions Cognitive;Chair Alarm;Bed Alarm;Telemetry;Fall Risk   Lifestyle   Autonomy I adls and mobility w/o ad - denies falls - family/neighbor assists with iadls/transportation   Reciprocal Relationships Ex-spouse local, daughters out of area (1 hour), neighbor, friend   Service to Others retired   Intrinsic Gratification staying active   ADL   LB Dressing Assistance 5  Supervision/Setup   LB Dressing Deficit Don/doff R sock;Don/doff L sock   Bed Mobility   Supine to Sit 6  Modified independent   Sit to Supine 6  Modified independent   Transfers   Sit to Stand 6  Modified independent   Stand to Sit 6  Modified independent   Additional Comments (-)AD   Cognition   Overall Cognitive Status (S)  Impaired   Arousal/Participation Alert;Responsive;Cooperative   Attention Attends with cues to redirect   Orientation Level Oriented X4   Memory Decreased recall of precautions;Decreased recall of recent events;Decreased short term memory   Following Commands Follows multistep commands with increased time or repetition   Comments pt pleasant and cooperative, pt performed ACLS (Gume Cognitive Standarized and evidence based assessment) with a score of 3.4 indicating 24/7 Supervision/assistancem, see below. Pt performed cogntive therapy worksheets with direction following and attention, good pace, a  few attention errors. Encouraged pt to continue cogntive therapy activities issued last session.   Cognition Assessment Tools ACLS   Score 3.4   Activity Tolerance   Activity Tolerance Patient limited by fatigue   Assessment   Assessment Patient participated in Skilled OT session this date with interventions consisting of ACLS standardized, evidence based assessment, functional transfers, family education with daughter re: pt's safety/cogntive concerns upon discharge. . Patient agreeable to OT treatment session, upon arrival patient was found supine in bed.  Patient requiring verbal cues for safety, verbal cues for correct technique, verbal cues for pacing thru activity steps, and cognitive assistance to anticipate next step. Patient continues to be functioning below baseline level, occupational performance remains limited secondary to factors listed above and increased risk for falls and injury.  The patient's raw score on the AM-PAC Daily Activity Inpatient Short Form is 24. A raw score of greater than or equal to 19 suggests the patient may benefit from discharge to home. Please refer to the recommendation of the Occupational Therapist for safe discharge planning.  From OT standpoint, recommendation at time of d/c would be 24/7 Supervision.  No further acute OT needs indicated at this time - Anticipate d/c home with family support when medically cleared - d/c from caseload    Plan   Treatment Interventions ADL retraining;Functional transfer training;UE strengthening/ROM;Endurance training;Cognitive reorientation;Patient/family training;Compensatory technique education;Continued evaluation;Energy conservation;Activityengagement   Goal Expiration Date 11/04/24   OT Treatment Day 2   OT Frequency 3-5x/wk   Discharge Recommendation   Rehab Resource Intensity Level, OT III (Minimum Resource Intensity)   AM-PAC Daily Activity Inpatient   Lower Body Dressing 4   Bathing 4   Toileting 4   Upper Body Dressing 4   Grooming  4   Eating 4   Daily Activity Raw Score 24   Daily Activity Standardized Score (Calc for Raw Score >=11) 57.54   AM-PAC Applied Cognition Inpatient   Following a Speech/Presentation 1   Understanding Ordinary Conversation 4   Taking Medications 2   Remembering Where Things Are Placed or Put Away 2   Remembering List of 4-5 Errands 1   Taking Care of Complicated Tasks 1   Applied Cognition Raw Score 11   Applied Cognition Standardized Score 27.03   Gume Cognitive Level   Gume Cognitive Level Score 3.4   Gume Cognitive Score Recommendation (S)  24 hour assistance   Gume Cognitive Level Comments pt demonstrated deficits in sustained attention, problem solving and immediate and delayed recall, personal health comprehension.   End of Consult   Education Provided Yes-called daughter educated on cognitive and safety concerns and recommendation of 24/7 supervision/assistance including S with medication management, hot stove meal prep, no sharp tools, increased assistance with IaDL's.    Patient Position at End of Consult Supine;Bed/Chair alarm activated;All needs within reach   Nurse Communication Nurse aware of consult     Miley Mc OTR/L

## 2024-10-25 NOTE — TELEPHONE ENCOUNTER
10/21/2024 - present (4 days)  [facility]      STILL ADMITTED    PATIENT WILL HAVE TO R/S HER OVS TO AN HFU LONG WITH SHANNA...

## 2024-10-25 NOTE — ASSESSMENT & PLAN NOTE
Patient with history of non-small cell lung adenocarcinoma diagnosed in March 2024.  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  She completed SRS for these metastatic lesions in August 2024.  She is on Decadron in the outpatient setting and has had multiple admissions due to episodes of altered mental status with increased vasogenic edema after attempting to taper Decadron dosing.  She presented to [facility] on 10/20 with worsening confusion and a stroke alert was called.  Imaging showed no acute findings.  She was loaded with Keppra, started on Decadron, and transferred to [place] [facility] for video EEG and MRI brain under neurocritical care for close monitoring.  The morning of 10/21 patient's mental status reportedly much improved and she was deemed stable for transfer out of the ICU.    Symptoms likely in the setting of Decadron taper    Of note, patient is not exactly sure what her current Decadron dosing is.  With that said, it appears that she had been on a Decadron taper throughout September starting at 8 mg daily that was reduced each week.  Her most recent orders show Decadron 1 mg daily from 10/17-10/24 and then 0.5 mg daily for 8 more days    vEEG update as of today: 26 hours of recording. No seizures. Continues to have left hemisphere slowing, left posterior quadrant maximal. There are also left posterior temporal / parietal poorly formed (blunt and at times sharply contoured) LPDs occurring at 0.5-1 Hz. LPDs indicate high seizure risk and are often associated with acute/subacute destructive neuronal injury. Right hemisphere is essentially normal.     Blood cultures no growth at 72 hours    Plan:  - MRI 10/21 show stable vasogenic edema in the left parietotemporal region; previously demonstrated left posterior parietal lesion as well visualized likely due to differences in technique.  No new metastasis, no new acute infarct or hemorrhage.  -  Neurosurgery, neurology, and palliative care consulted.  Appreciate assistance  - Per neurosurgery, no emergent neurosurgical intervention warranted  - Per neurology, continue Keppra 100 mg BID , okay to discharge with this dosage.  Also recommended Plavix or aspirin given right monocular inferior temporal vision for impairment at high risk of developing CV A. Follow-up with epilepsy clinic in 4 to 6 weeks  - Monitor on video EEG  - Continue Decadron 4 mg every 6 hours until 10/27 followed by 4 mg BID for 5 doses starting 10/28; 2 mg BID on 10/31 to 11/1, and 2 mg on 11/2 to 11/11. Patient should have enough doses until her heme-onc appointment on 11/5/24  - Continue monitoring glucose given on dexamethasone  - SBP goal <160  - Stat CT head with any neurological decline for return of altered mental status  - Neurochecks every 4 hours  - Seizure precautions in place  - PT/OT eval

## 2024-10-25 NOTE — ASSESSMENT & PLAN NOTE
Documented history of hypertension but not on any antihypertensives at home.  Blood pressure is acceptable this admission  Patient and chart review, patient usually taking multipin 5 mg and losartan 50 mg daily before being discontinued in June 2024    BP overnight high 120s/70s     Plan:  - Continue amlodipine 5 mg

## 2024-10-25 NOTE — QUICK NOTE
Called patient's daughter, Juliana, and updated her on Ayla. Was told that her mood changed drastically when her ex- arrived, she was yelling and crying. When I checked on patient a few mins ago, patient was mentating well and answering all the questions appropriately. No drastic mood noted. Daughter worried it could be the steroid taper. I explained to her that it could be components of the progression of cancer, steroid, and waxing and weaning while inpatient. I reached out to Rad-Onc, Honey Gamboa MD to make sure we are on the same page for steroid taper regimen, he recommended slower taper:  Decadron 4mg PO every 6 hours for 5 days until 10/28/24  Decadron 4mg PO twice daily for 5 days from 10/29/24 to 11/2/24  Decadron 2mg PO twice daily for 5 days from 11/3/24 to 11/7/24  Decadron 2mg PO daily until her next MRI on 12/27 (she can try to self taper to 1mg during this interval if she wishes). Also touched base with CM for placement.    Detail Level: Detailed Quality 110: Preventive Care And Screening: Influenza Immunization: Influenza Immunization Administered during Influenza season

## 2024-10-25 NOTE — ASSESSMENT & PLAN NOTE
Palliative care following  Given GFR less than 60, as needed Toradol was discontinued today    Patient states that she would like to increase the dose of Tylenol if allowed, Also states that gabapentin reduces her pain to 2/10 and she is able to sleep better.     Plan:  - Palliative following. Continue Tylenol 650 mg every 6 hours  - Continue gabapentin 300 mg at bedtime  - Patient would like to avoid opioids if possible

## 2024-10-25 NOTE — ASSESSMENT & PLAN NOTE
Hgb 8.9 and  on admission. 9/13/24 folate >22.3, B12 1,268. 9/5/23 MMA wnl. Patient has been having chronic macrocytic anemia since 9/2023.    Plan:  - Continue to monitor hgb daily

## 2024-10-25 NOTE — RESTORATIVE TECHNICIAN NOTE
Restorative Technician Note      Patient Name: Ayla Nye     Note Type: Mobility  Patient Position Upon Consult: Supine  Activity Performed: Ambulated; Dangled; Stood  Assistive Device: Other (Comment) (none)  Education Provided: Yes  Patient Position at End of Consult: Supine; All needs within reach; Bed/Chair alarm activated    Shane MCRAE, Restorative Technician,

## 2024-10-25 NOTE — PROGRESS NOTES
Progress Note - Palliative Care   Name: Ayla Nye 73 y.o. female I MRN: [id number]  Unit/Bed#: [facility] 717-01 I Date of Admission: 10/21/2024   Date of Service: 10/23/2024 I Hospital Day: 2      Assessment & Plan  Metastatic cancer to brain (HCC)  Continue full-cares, including cancer-directed therapy:  Discussed case with Radiation Oncology. Recommended slow taper to a minimum-dose of Dexamethasone 2 mg Daily, which should be continued indefinitely, without taper.  Discussed case with Medical Oncology, given upcoming infusion appointment for administration of Cycle 2 of Carboplatin, Pemetrexed, and Pembrolizumab on Monday, October 28th, 2024. Patient is appropriate for resumption of treatment; however, the above-mentioned is depending on disposition (e.g. If patient requires discharge to a rehabilitation facility, treatment will be delayed until she returns home).     Hypertension     Mixed hyperlipidemia     Malignant neoplasm of upper lobe, right bronchus or lung (HCC)     Hypothyroidism     Palliative care patient  Palliative Diagnosis: Recurrent metastatic non-small cell lung cancer with intracranial metastasis.     Goals:   Continue full-cares, including cancer-directed therapy.  Palliative Care will continue to follow for symptom-management.     Social Support:  Supportive listening provided.  Normalized experience of patient.  Provided anxiety containment.  Advocated for patient/family with interdisciplinary team.  Mediated conflict.     Care Coordination:  Case discussed with patient, Medical Oncology, Radiation Oncology, and primary Internal Medicine team.     Follow-Up:  Palliative Care to continue to follow for symptom-management.  Please do not hesitate to contact our on-call provider through [employer] Secure Chat or contact [phone] should there be an acute change or other symptom control concerns.     Acute encephalopathy  Resolving.  Cancer associated pain  Continue current pain-regimen:  Patient  requested dose-increase to Acetaminophen 975 mg Q6HR (Scheduled).  Will continue Acetaminophen 650 mg Q6HR (Scheduled).  Increase Gabapentin to 200 mg PO Bedtime (Scheduled).  Failed-therapies:  Patient requesting avoidance of Opiates (Note: She is now agreeable to reinitiation and continuation of Gabapentin).    Subjective:  Interval History: Patient was seen and examined. Resting comfortably in bed, on my initial arrival. Overnight events were remarkable for marked improvement in sleep-quality and pain-control, following initiation of Gabapentin 100 mg PO Bedtime. Patient states that this was the first time in the entirety of her hospitalization that she was able to sleep well. She notes that the present dose of scheduled Acetaminophen (e.g. Acetaminophen 650 mg Q6HR), does not quite lower the severity of her pain to a comfortable level. She is requesting transition back to Acetaminophen 975 mg Q6HR Scheduled (Home-dose). We discussed the risks of excessive Acetaminophen-use and recommendation for a total-daily dose less than 3,000 mg. Patient would like to continue to discuss this further. Additionally, patient requests further communication with her Medical Oncologist regarding resumption of systemic-chemoimmunotherapy. Patient has an upcoming appointment on Monday, October 28th, 2024 for initiation of Cycle 2 of Carboplatin, Pemetrexed, and Pembrolizumab. Discussed further with Medical Oncology, and patient may resume treatment as planned. Simultaneously, if patient requires discharge to a rehabilitation facility, treatment will unfortunately be delay. Will discuss this further with patient, on return during afternoon rounds.    Review of Systems:  All systems reviewed and negative except otherwise listed in the History of Present Illness.    Oncologic History:  Oncology History Overview Note   Patient has history of [id number] (IIIB) NSCLC of the right upper lobe, s/p concurrent chemoRT completing on 7/5/24. She  completed a course of SRS on 8/8/24 after MRI brain demonstrated five supra- and infratentorial enhancing lesions compatible with metastases. Currently receiving a course of palliative radiation therapy to right pelvic mass. She returns today for 2 month follow-up with repeat MRI.    10/8/24 MRI Brain BT w wo Contrast  IMPRESSION:  Mixed treatment response since MRI brain 9/10/2024  - Slightly increase size of left temporal lobe metastatic lesion.  - Unchanged left occipital lobe metastatic lesion with evidence of radiation necrosis, left paramedian parietal lobe lesion, and tiny left anterolateral lobe frontal lobe lesion.  - Unchanged enhancement in bilateral cranial nerve VII and both internal auditory canals, suspicious for leptomeningeal metastasis.  - Decreased size of left paramedian frontal lobe lesion and tiny cerebellar vermis lesion.  - No new enhancing intracranial metastasis.  Persistent diffuse perilesional vasogenic edema in left parietal lobe and left occipital lobe with mild mass effect on posterior aspect of left lateral ventricle.    Upcoming:       Malignant neoplasm of upper lobe, right bronchus or lung (HCC)   2024 Initial Diagnosis    Primary lung adenocarcinoma, right (HCC)     3/26/2024 Biopsy    A-C. Lymph Node, Level 4R :    - Metastatic non-small cell carcinoma, most compatible with a primary lung adenocarcinoma; see note.       D-F. Lymph Node, Level 10R:    - Metastatic non-small cell carcinoma; see note.       G-I. Lymph Node, Level 4L:    - Metastatic non-small cell carcinoma; see note.       4/15/2024 -  Cancer Staged    Staging form: Lung, AJCC 8th Edition  - Clinical stage from 4/15/2024: Stage IIIB (cT2b, cN3, cM0) - Signed by Rogelio Beebe DO on 4/15/2024       5/23/2024 - 7/2/2024 Chemotherapy    alteplase (CATHFLO), 2 mg, Intracatheter, Every 1 Minute as needed, 6 of 6 cycles  CARBOplatin (PARAPLATIN) IVPB (GOG AUC DOSING), 130.4 mg, Intravenous, Once, 6 of 6  cycles  Administration: 130.4 mg (5/23/2024), 144.8 mg (5/30/2024), 138.4 mg (6/6/2024), 144.8 mg (6/13/2024), 132 mg (6/21/2024), 143.6 mg (7/2/2024)  PACLItaxel (TAXOL) chemo IVPB, 45 mg/m2 = 67.2 mg (90 % of original dose 50 mg/m2), Intravenous, Once, 6 of 6 cycles  Dose modification: 45 mg/m2 (original dose 50 mg/m2, Cycle 1, Reason: Anticipated Tolerance)  Administration: 67.2 mg (5/23/2024), 67.2 mg (5/30/2024), 67.2 mg (6/6/2024), 67.2 mg (6/13/2024), 67.2 mg (6/21/2024), 67.2 mg (7/2/2024)     5/23/2024 - 7/5/2024 Radiation    Treatment:  Course: C1    Plan ID Energy Fractions Dose per Fraction (cGy) Dose Correction (cGy) Total Dose Delivered (cGy) Elapsed Days   R Lung_Hilum 6X 30 / 30 200 0 6,000 43      Treatment dates:  C1: 5/23/2024 - 7/5/2024 8/8/2024 - 8/8/2024 Radiation    SRS 5 PTVs   2000 cGy     9/12/2024 Biopsy    Mass, Superficial perianal mass:  - Squamous cell carcinoma.     Note: The patient's prior lung EBUS sampling shows the tumor to stain for TTF1 and Napsin with absent p40 expression.  The current perianal mass sampling shows diffuse p40 expression with absent TTF1 expression.  This may represent a primary anal/perianal squamous cell carcinoma versus a possible metastatic combined lung tumor (adenocarcinoma and previously unsampled squamous component).  Suggest clinical correlation and appropriate follow-up.         9/23/2024 -  Cancer Staged    Staging form: Lung, AJCC 8th Edition  - Clinical: Stage IV (cM1) - Signed by Honey Gamboa MD on 9/23/2024       10/7/2024 -  Chemotherapy    cyanocobalamin, 1,000 mcg, Intramuscular, Once, 1 of 3 cycles  Administration: 1,000 mcg (8/19/2024)  alteplase (CATHFLO), 2 mg, Intracatheter, Every 1 Minute as needed, 1 of 6 cycles  fosaprepitant (EMEND) IVPB, 150 mg, Intravenous, Once, 1 of 6 cycles  Administration: 150 mg (10/7/2024)  CARBOplatin (PARAPLATIN) IVPB ([facility] AUC DOSING), 347 mg, Intravenous, Once, 1 of 6 cycles  Administration: 347 mg  (10/7/2024)  pemetrexed (ALIMTA) chemo infusion, 735 mg, Intravenous, Once, 1 of 6 cycles  Administration: 700 mg (10/7/2024)  pembrolizumab (KEYTRUDA) IVPB, 200 mg, Intravenous, Once, 1 of 6 cycles  Administration: 200 mg (10/7/2024)     Metastatic cancer to brain (HCC)   7/29/2024 Initial Diagnosis    Metastatic cancer to brain (HCC)     8/8/2024 - 8/8/2024 Radiation    SRS 5 PTVs   2000 cGy     9/12/2024 Biopsy    Mass, Superficial perianal mass:  - Squamous cell carcinoma.     Note: The patient's prior lung EBUS sampling shows the tumor to stain for TTF1 and Napsin with absent p40 expression.  The current perianal mass sampling shows diffuse p40 expression with absent TTF1 expression.  This may represent a primary anal/perianal squamous cell carcinoma versus a possible metastatic combined lung tumor (adenocarcinoma and previously unsampled squamous component).  Suggest clinical correlation and appropriate follow-up.           Objective:  Vitals:    10/25/24 0830   BP: 130/75   Pulse: 72   Resp: 18   Temp: 97.7 °F (36.5 °C)   SpO2: 95%     Physical Exam:  General: Alert, and oriented; Pleasant, and conversational; Overall, Well-Appearing  HEENT: Atraumatic, and normocephalic; PERRLA; EOMI; Moist mucosal membranes  Respiratory: Normal work of breathing; No supplemental-oxygen requirement  Extremities: No obvious deformities; No peripheral edema; Moves all  Neurologic: Appropriately alert, and oriented to Person, Place, and Time    WBC   Date Value Ref Range Status   10/25/2024 7.84 4.31 - 10.16 Thousand/uL Final   10/24/2024 6.92 4.31 - 10.16 Thousand/uL Final   10/23/2024 5.09 4.31 - 10.16 Thousand/uL Final     Hemoglobin   Date Value Ref Range Status   10/25/2024 9.5 (L) 11.5 - 15.4 g/dL Final   10/24/2024 9.8 (L) 11.5 - 15.4 g/dL Final   10/23/2024 9.5 (L) 11.5 - 15.4 g/dL Final     Platelets   Date Value Ref Range Status   10/25/2024 309 149 - 390 Thousands/uL Final   10/24/2024 330 149 - 390 Thousands/uL  Final   10/23/2024 262 149 - 390 Thousands/uL Final     MCV   Date Value Ref Range Status   10/25/2024 100 (H) 82 - 98 fL Final   10/24/2024 103 (H) 82 - 98 fL Final   10/23/2024 99 (H) 82 - 98 fL Final      Potassium   Date Value Ref Range Status   10/25/2024 4.2 3.5 - 5.3 mmol/L Final   10/24/2024 3.9 3.5 - 5.3 mmol/L Final   10/23/2024 4.3 3.5 - 5.3 mmol/L Final     Chloride   Date Value Ref Range Status   10/25/2024 107 96 - 108 mmol/L Final   10/24/2024 105 96 - 108 mmol/L Final   10/23/2024 105 96 - 108 mmol/L Final     CO2   Date Value Ref Range Status   10/25/2024 25 21 - 32 mmol/L Final   10/24/2024 24 21 - 32 mmol/L Final   10/23/2024 26 21 - 32 mmol/L Final     BUN   Date Value Ref Range Status   10/25/2024 24 5 - 25 mg/dL Final   10/24/2024 24 5 - 25 mg/dL Final   10/23/2024 22 5 - 25 mg/dL Final     Creatinine   Date Value Ref Range Status   10/25/2024 0.85 0.60 - 1.30 mg/dL Final     Comment:     Standardized to IDMS reference method   10/24/2024 0.91 0.60 - 1.30 mg/dL Final     Comment:     Standardized to IDMS reference method   10/23/2024 0.92 0.60 - 1.30 mg/dL Final     Comment:     Standardized to IDMS reference method     Glucose   Date Value Ref Range Status   10/25/2024 112 65 - 140 mg/dL Final     Comment:     If the patient is fasting, the ADA then defines impaired fasting glucose as > 100 mg/dL and diabetes as > or equal to 123 mg/dL.   10/24/2024 127 65 - 140 mg/dL Final     Comment:     If the patient is fasting, the ADA then defines impaired fasting glucose as > 100 mg/dL and diabetes as > or equal to 123 mg/dL.   10/23/2024 137 65 - 140 mg/dL Final     Comment:     If the patient is fasting, the ADA then defines impaired fasting glucose as > 100 mg/dL and diabetes as > or equal to 123 mg/dL.     Calcium   Date Value Ref Range Status   10/25/2024 8.8 8.4 - 10.2 mg/dL Final   10/24/2024 8.9 8.4 - 10.2 mg/dL Final   10/23/2024 9.0 8.4 - 10.2 mg/dL Final     Albumin   Date Value Ref Range  Status   10/21/2024 3.9 3.5 - 5.0 g/dL Final   10/20/2024 3.8 3.5 - 5.0 g/dL Final   10/03/2024 3.7 3.5 - 5.0 g/dL Final     Total Bilirubin   Date Value Ref Range Status   10/21/2024 0.27 0.20 - 1.00 mg/dL Final     Comment:     Use of this assay is not recommended for patients undergoing treatment with eltrombopag due to the potential for falsely elevated results.  N-acetyl-p-benzoquinone imine (metabolite of Acetaminophen) will generate erroneously low results in samples for patients that have taken an overdose of Acetaminophen.   10/20/2024 0.25 0.20 - 1.00 mg/dL Final     Comment:     Use of this assay is not recommended for patients undergoing treatment with eltrombopag due to the potential for falsely elevated results.  N-acetyl-p-benzoquinone imine (metabolite of Acetaminophen) will generate erroneously low results in samples for patients that have taken an overdose of Acetaminophen.   10/03/2024 0.30 0.20 - 1.00 mg/dL Final     Comment:     Use of this assay is not recommended for patients undergoing treatment with eltrombopag due to the potential for falsely elevated results.  N-acetyl-p-benzoquinone imine (metabolite of Acetaminophen) will generate erroneously low results in samples for patients that have taken an overdose of Acetaminophen.     Alkaline Phosphatase   Date Value Ref Range Status   10/21/2024 47 34 - 104 U/L Final   10/20/2024 45 34 - 104 U/L Final   10/03/2024 34 34 - 104 U/L Final     AST   Date Value Ref Range Status   10/21/2024 12 (L) 13 - 39 U/L Final   10/20/2024 12 (L) 13 - 39 U/L Final   10/03/2024 12 (L) 13 - 39 U/L Final     ALT   Date Value Ref Range Status   10/21/2024 12 7 - 52 U/L Final     Comment:     Specimen collection should occur prior to Sulfasalazine administration due to the potential for falsely depressed results.    10/20/2024 12 7 - 52 U/L Final     Comment:     Specimen collection should occur prior to Sulfasalazine administration due to the potential for falsely  "depressed results.    10/03/2024 12 7 - 52 U/L Final     Comment:     Specimen collection should occur prior to Sulfasalazine administration due to the potential for falsely depressed results.       LD   Date Value Ref Range Status   10/03/2024 461 (H) 140 - 271 U/L Final   09/13/2024 256 140 - 271 U/L Final   08/24/2024 207 140 - 271 U/L Final    No results found for: \"TSH\" No results found for: \"W7IKUQN\"   Free T4   Date Value Ref Range Status   09/13/2024 0.92 0.61 - 1.12 ng/dL Final     Comment:     Specimens with biotin concentrations > 10 ng/mL can lead to significant (> 10%) positive bias in result.   08/24/2024 0.77 0.61 - 1.12 ng/dL Final     Comment:     Specimens with biotin concentrations > 10 ng/mL can lead to significant (> 10%) positive bias in result.      Patient was seen and discussed with attending physician, iTn Yepez M.D.    Salome Underwood D.O.  Hematology-Oncology Fellow (PGY-5)   "

## 2024-10-25 NOTE — CASE MANAGEMENT
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     Palliative care patient 2024     Cancer associated pain 2024     Goals of care, counseling/discussion 2024     Right hip pain 09/10/2024     Altered mental status 2024     Hypothyroidism 2024     Abnormal imaging of central nervous system 2024     Acute metabolic encephalopathy 2024     Malignant neoplasm metastatic to brain (HCC) 2024     Brain mass 2024     Metastatic cancer to brain (HCC) 2024     Visual disturbance 2024     Dehydration 2024     Moderate protein-calorie malnutrition (HCC) 2024     Bilateral leg pain 2024     Insomnia 2024     Malignant neoplasm of upper lobe, right bronchus or lung (HCC) 2024     Age-related osteoporosis without current pathological fracture 2023     Encounter for monitoring of hydroxyurea therapy 2023     JAK2 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 2023     Hypertension 08/10/2022     Mixed hyperlipidemia 08/10/2022     Essential thrombocytosis (HCC) 2020     TB lung, 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     Psoriatic arthritis (HCC) 09/10/2019       LOS (days): 4  Geometric Mean LOS (GMLOS) (days): 2.8  Days to GMLOS:-1.7     OBJECTIVE:  Risk of Unplanned Readmission Score: 30.34         Current admission status: Inpatient   Preferred Pharmacy:   [id number]  [place] [facility] [place] - [address]  [address]  [facility] [place] [address]  Phone: [phone] Fax: [phone]    Primary Care Provider: Rafy Angelo  MD    Primary Insurance: MEDICARE  Secondary Insurance: AARP    DISCHARGE DETAILS:    Discharge planning discussed with:: Juliana (daughter)              Additional Comments: CM spoke to daughter Juliana regarding discharge planning and OT recommendations, and [facility] denial.  After a long conversation, CM  will email Juliana resources for home health aides and assistive living resources, so that family can make a plan for a safe discharge.

## 2024-10-25 NOTE — PROGRESS NOTES
Progress Note - Internal Medicine   Name: Ayla Nye 73 y.o. female I MRN: [id number]  [facility] I Date of Admission: 10/21/2024   Date of Service: 10/25/2024 I Hospital Day: 4    Assessment & Plan  Metastatic cancer to brain (HCC)  Patient with history of non-small cell lung adenocarcinoma diagnosed in March 2024.  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  She completed SRS for these metastatic lesions in August 2024.  She is on Decadron in the outpatient setting and has had multiple admissions due to episodes of altered mental status with increased vasogenic edema after attempting to taper Decadron dosing.  She presented to [facility] on 10/20 with worsening confusion and a stroke alert was called.  Imaging showed no acute findings.  She was loaded with Keppra, started on Decadron, and transferred to [facility] for video EEG and MRI brain under neurocritical care for close monitoring.  The morning of 10/21 patient's mental status reportedly much improved and she was deemed stable for transfer out of the ICU.    Symptoms likely in the setting of Decadron taper    Of note, patient is not exactly sure what her current Decadron dosing is.  With that said, it appears that she had been on a Decadron taper throughout September starting at 8 mg daily that was reduced each week.  Her most recent orders show Decadron 1 mg daily from 10/17-10/24 and then 0.5 mg daily for 8 more days    vEEG update as of today: 26 hours of recording. No seizures. Continues to have left hemisphere slowing, left posterior quadrant maximal. There are also left posterior temporal / parietal poorly formed (blunt and at times sharply contoured) LPDs occurring at 0.5-1 Hz. LPDs indicate high seizure risk and are often associated with acute/subacute destructive neuronal injury. Right hemisphere is essentially normal.     Blood cultures no growth at 72 hours    Plan:  - MRI  10/21 show stable vasogenic edema in the left parietotemporal region; previously demonstrated left posterior parietal lesion as well visualized likely due to differences in technique.  No new metastasis, no new acute infarct or hemorrhage.  - Neurosurgery, neurology, and palliative care consulted.  Appreciate assistance  - Per neurosurgery, no emergent neurosurgical intervention warranted  - Per neurology, continue Keppra 100 mg BID , okay to discharge with this dosage.  Also recommended Plavix or aspirin given right monocular inferior temporal vision for impairment at high risk of developing CV A. Follow-up with epilepsy clinic in 4 to 6 weeks  - Monitor on video EEG  - Continue Decadron 4 mg every 6 hours until 10/27 followed by 4 mg BID for 5 doses starting 10/28; 2 mg BID on 10/31 to 11/1, and 2 mg on 11/2 to 11/11. Patient should have enough doses until her heme-onc appointment on 11/5/24  - Continue monitoring glucose given on dexamethasone  - SBP goal <160  - Stat CT head with any neurological decline for return of altered mental status  - Neurochecks every 4 hours  - Seizure precautions in place  - PT/OT eval  Hypertension  Documented history of hypertension but not on any antihypertensives at home.  Blood pressure is acceptable this admission  Patient and chart review, patient usually taking multipin 5 mg and losartan 50 mg daily before being discontinued in June 2024    BP overnight high 120s/70s     Plan:  - Continue amlodipine 5 mg    Mixed hyperlipidemia  Lipid panel 7/20/2024: , TG 76, HDL 60,   ASCVD of 18.4%.  Not on any statin therapy per chart review    Plan:  - Continue Lipitor 20 mg daily  Malignant neoplasm of upper lobe, right bronchus or lung (HCC)  Patient with a history of non-small cell lung cancer of the right upper lobe with metastasis to the brain diagnosed in March 2024.  She was on weekly carboplatin and paclitaxel with radiation from 5/23/2024 - 7/5/2024.  Further imaging  after treatment showed signs concerning of recurrent metastatic NSCLC now with possible metastasis to the pelvis.  For this, it was recommended that she undergo systemic chemoimmunotherapy with carboplatin, pemetrexed, and pembrolizumab.  Her first cycle was on 10/7/2024.    Plan:  - Patient following closely with oncology in the outpatient setting  - Remainder of care for active concerns as mentioned above  - CT chest abdomen and pelvis revealed new small bilateral pulmonary nodules, likely metastasis, new right renal lesion(s) concerning for metastasis, significant interval enlargement of necrotic appearing mass in the right ischiorectal fossa, likely metastasis, deceasing RUL necrotic mass with stable and/or decreasing satellite nodularity, improved mediastinal and right hilar adenopathy, stable 2.4 cm CHUYITA groundglass density.  - Patient will f/u with heme-onc on 11/5/24    Hypothyroidism  Continue PTA levothyroxine 50 mcg daily  Palliative care patient  Palliative care following  Given GFR less than 60, as needed Toradol was discontinued today    Patient states that she would like to increase the dose of Tylenol if allowed, Also states that gabapentin reduces her pain to 2/10 and she is able to sleep better.     Plan:  - Palliative following. Continue Tylenol 650 mg every 6 hours  - Continue gabapentin 300 mg at bedtime  - Patient would like to avoid opioids if possible  Anemia  Hgb 8.9 and  on admission. 9/13/24 folate >22.3, B12 1,268. 9/5/23 MMA wnl. Patient has been having chronic macrocytic anemia since 9/2023.    Plan:  - Continue to monitor hgb daily    Disposition: Subacute vs home with home health. Pending OT     Team: SOD TEAM C    Subjective   Patient seen and examined. No acute events overnight.  Patient states gabapentin alleviates her pain to 2/10 and she had better sleep last night. She had a bowel movement yesterday. No acute complaints from patient this morning. Denies headache, dizziness,  visual changes, chest pain discomfort, abdominal pain, and urinary symptoms.    Objective :  Temp:  [98.4 °F (36.9 °C)-99.4 °F (37.4 °C)] 98.4 °F (36.9 °C)  HR:  [66-72] 72  BP: (125-144)/(73-88) 125/73  Resp:  [16] 16  SpO2:  [89 %-99 %] 94 %  O2 Device: None (Room air)    I/O         10/20 0701  10/21 0700 10/21 0701  10/22 0700    I.V. (mL/kg) 146.3 442.5 (8.5)    IV Piggyback  50    Total Intake(mL/kg) 146.3 492.5 (9.4)    Urine (mL/kg/hr) 650 1165 (0.9)    Total Output 650 1165    Net -503.8 -672.5          Unmeasured Urine Occurrence  1 x          Weights:        Body mass index is 20.12 kg/m².  Weight (last 2 days)       Date/Time Weight    10/24/24 0600 49.9 (110.01)    10/23/24 0600 50.4 (111.11)            Physical Exam  Vitals and nursing note reviewed.   Constitutional:       General: She is not in acute distress.     Appearance: She is well-developed.   HENT:      Head: Normocephalic and atraumatic.   Eyes:      General: No scleral icterus.     Extraocular Movements: Extraocular movements intact.      Conjunctiva/sclera: Conjunctivae normal.   Cardiovascular:      Rate and Rhythm: Normal rate and regular rhythm.      Pulses: Normal pulses.      Heart sounds: Normal heart sounds. No murmur heard.  Pulmonary:      Effort: Pulmonary effort is normal. No respiratory distress.      Comments: Decreased breath sounds in R side more prominent in RUL    Abdominal:      General: Bowel sounds are normal.      Palpations: Abdomen is soft.      Tenderness: There is no abdominal tenderness.   Musculoskeletal:         General: No swelling.      Cervical back: Neck supple.      Right lower leg: No edema.      Left lower leg: No edema.   Skin:     General: Skin is warm and dry.      Capillary Refill: Capillary refill takes less than 2 seconds.      Findings: No bruising or lesion.   Neurological:      General: No focal deficit present.      Mental Status: She is alert.      Cranial Nerves: Cranial nerves 2-12 are  intact.   Psychiatric:         Mood and Affect: Mood normal.           Lab Results: I have reviewed the following results:  Recent Labs     10/25/24  0516   WBC 7.84   HGB 9.5*   HCT 29.5*      SODIUM 140   K 4.2      CO2 25   BUN 24   CREATININE 0.85   GLUC 112       Imaging Results Review: I personally reviewed the following image studies in PACS and associated radiology reports: chest xray, CT head, CT A neck and head, and MRI brain. My interpretation of the radiology images/reports is:  .  Other Study Results Review: EKG was reviewed.     Currently Ordered Meds:   Current Facility-Administered Medications:     acetaminophen (TYLENOL) tablet 650 mg, Q6H KRISS    amLODIPine (NORVASC) tablet 5 mg, Daily    atorvastatin (LIPITOR) tablet 20 mg, Daily With Dinner    dexamethasone (DECADRON) tablet 4 mg, Q6H KRISS **FOLLOWED BY** [START ON 10/28/2024] dexamethasone (DECADRON) tablet 4 mg, Q12H KRISS **FOLLOWED BY** [START ON 10/30/2024] dexamethasone (DECADRON) tablet 2 mg, Q12H KRISS **FOLLOWED BY** [START ON 11/2/2024] dexamethasone (DECADRON) tablet 2 mg, Daily    enoxaparin (LOVENOX) subcutaneous injection 30 mg, Q24H KRISS    gabapentin (NEURONTIN) capsule 300 mg, HS    levETIRAcetam (KEPPRA) injection 1,000 mg, Q12H KRISS    levothyroxine tablet 50 mcg, Early Morning    melatonin tablet 6 mg, HS PRN    ondansetron (ZOFRAN) injection 4 mg, Q4H PRN    pantoprazole (PROTONIX) EC tablet 40 mg, Early Morning    senna-docusate sodium (SENOKOT S) 8.6-50 mg per tablet 2 tablet, BID    Facility-Administered Medications Ordered in Other Encounters:     fentaNYL injection, PRN    midazolam (VERSED) injection, PRN  VTE Pharmacologic Prophylaxis: Sequential compression device (Venodyne)  and Enoxaparin (Lovenox)  VTE Mechanical Prophylaxis: sequential compression device    Administrative Statements   I have spent a total time of 45 minutes in caring for this patient on the day of the visit/encounter including Diagnostic  results, Prognosis, Patient and family education, Importance of tx compliance, Counseling / Coordination of care, Documenting in the medical record, Reviewing / ordering tests, medicine, procedures  , and Obtaining or reviewing history  .  Portions of the record may have been created with voice recognition software.

## 2024-10-25 NOTE — ASSESSMENT & PLAN NOTE
Patient with history of non-small cell lung adenocarcinoma diagnosed in March 2024.  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  She completed SRS for these metastatic lesions in August 2024.  She is on Decadron in the outpatient setting and has had multiple admissions due to episodes of altered mental status with increased vasogenic edema after attempting to taper Decadron dosing.  She presented to [facility] on 10/20 with worsening confusion and a stroke alert was called.  Imaging showed no acute findings.  She was loaded with Keppra, started on Decadron, and transferred to [place] for video EEG and MRI brain under neurocritical care for close monitoring.  The morning of 10/21 patient's mental status reportedly much improved and she was deemed stable for transfer out of the ICU.    Symptoms likely in the setting of Decadron taper    Of note, patient is not exactly sure what her current Decadron dosing is.  With that said, it appears that she had been on a Decadron taper throughout September starting at 8 mg daily that was reduced each week.  Her most recent orders show Decadron 1 mg daily from 10/17-10/24 and then 0.5 mg daily for 8 more days    vEEG update as of today: 26 hours of recording. No seizures. Continues to have left hemisphere slowing, left posterior quadrant maximal. There are also left posterior temporal / parietal poorly formed (blunt and at times sharply contoured) LPDs occurring at 0.5-1 Hz. LPDs indicate high seizure risk and are often associated with acute/subacute destructive neuronal injury. Right hemisphere is essentially normal.     Blood cultures no growth at 72 hours    Plan:  - MRI 10/21 show stable vasogenic edema in the left parietotemporal region; previously demonstrated left posterior parietal lesion as well visualized likely due to differences in technique.  No new metastasis, no new acute infarct or hemorrhage.  -  Neurosurgery, neurology, and palliative care consulted.  Appreciate assistance  - Per neurosurgery, no emergent neurosurgical intervention warranted  - Per neurology, continue Keppra 100 mg BID , okay to discharge with this dosage.  Also recommended Plavix or aspirin given right monocular inferior temporal vision for impairment at high risk of developing CV A. Follow-up with epilepsy clinic in 4 to 6 weeks  - Monitor on video EEG  - Reached out Dr. Gamboa, Rad-Onc and recommended slower taper:   Decadron 4mg PO every 6 hours for 5 days until 10/28/24  Decadron 4mg PO twice daily for 5 days from 10/29/24 to 11/2/24  Decadron 2mg PO twice daily for 5 days from 11/3/24 to 11/7/24  Decadron 2mg PO daily until her next MRI on 12/27 (she can try to self taper to 1mg during this interval if she wishes)   - PPI for gastric prophylaxis in setting of prolonged Decadron use; patient taking Decadron chronically prior to admission.  - Continue monitoring glucose while on dexamethasone therapy  - SBP goal <160  - Stat CT head with any neurological decline for return of altered mental status  - Neurochecks every 4 hours  - Seizure precautions in place  - PT/OT eval

## 2024-10-26 LAB
ANION GAP SERPL CALCULATED.3IONS-SCNC: 8 MMOL/L (ref 4–13)
BACTERIA BLD CULT: NORMAL
BUN SERPL-MCNC: 25 MG/DL (ref 5–25)
CALCIUM SERPL-MCNC: 8.7 MG/DL (ref 8.4–10.2)
CHLORIDE SERPL-SCNC: 107 MMOL/L (ref 96–108)
CO2 SERPL-SCNC: 25 MMOL/L (ref 21–32)
CREAT SERPL-MCNC: 0.8 MG/DL (ref 0.6–1.3)
ERYTHROCYTE [DISTWIDTH] IN BLOOD BY AUTOMATED COUNT: 13.9 % (ref 11.6–15.1)
GFR SERPL CREATININE-BSD FRML MDRD: 72 ML/MIN/1.73SQ M
GLUCOSE SERPL-MCNC: 104 MG/DL (ref 65–140)
HCT VFR BLD AUTO: 29.7 % (ref 34.8–46.1)
HGB BLD-MCNC: 9.6 G/DL (ref 11.5–15.4)
MCH RBC QN AUTO: 32.4 PG (ref 26.8–34.3)
MCHC RBC AUTO-ENTMCNC: 32.3 G/DL (ref 31.4–37.4)
MCV RBC AUTO: 100 FL (ref 82–98)
PLATELET # BLD AUTO: 330 THOUSANDS/UL (ref 149–390)
PMV BLD AUTO: 8.9 FL (ref 8.9–12.7)
POTASSIUM SERPL-SCNC: 4.1 MMOL/L (ref 3.5–5.3)
RBC # BLD AUTO: 2.96 MILLION/UL (ref 3.81–5.12)
SODIUM SERPL-SCNC: 140 MMOL/L (ref 135–147)
WBC # BLD AUTO: 10.05 THOUSAND/UL (ref 4.31–10.16)

## 2024-10-26 PROCEDURE — 95720 EEG PHY/QHP EA INCR W/VEEG: CPT | Performed by: PSYCHIATRY & NEUROLOGY

## 2024-10-26 PROCEDURE — 99232 SBSQ HOSP IP/OBS MODERATE 35: CPT | Performed by: INTERNAL MEDICINE

## 2024-10-26 PROCEDURE — 80048 BASIC METABOLIC PNL TOTAL CA: CPT

## 2024-10-26 PROCEDURE — 85027 COMPLETE CBC AUTOMATED: CPT

## 2024-10-26 RX ADMIN — ACETAMINOPHEN 650 MG: 325 TABLET, FILM COATED ORAL at 12:38

## 2024-10-26 RX ADMIN — GABAPENTIN 300 MG: 300 CAPSULE ORAL at 21:52

## 2024-10-26 RX ADMIN — PANTOPRAZOLE SODIUM 40 MG: 40 TABLET, DELAYED RELEASE ORAL at 05:03

## 2024-10-26 RX ADMIN — ACETAMINOPHEN 650 MG: 325 TABLET, FILM COATED ORAL at 00:23

## 2024-10-26 RX ADMIN — AMLODIPINE BESYLATE 5 MG: 5 TABLET ORAL at 08:09

## 2024-10-26 RX ADMIN — DEXAMETHASONE 4 MG: 4 TABLET ORAL at 05:03

## 2024-10-26 RX ADMIN — LEVETIRACETAM 1000 MG: 100 INJECTION, SOLUTION INTRAVENOUS at 21:52

## 2024-10-26 RX ADMIN — ACETAMINOPHEN 650 MG: 325 TABLET, FILM COATED ORAL at 05:04

## 2024-10-26 RX ADMIN — DEXAMETHASONE 4 MG: 4 TABLET ORAL at 17:11

## 2024-10-26 RX ADMIN — LEVOTHYROXINE SODIUM 50 MCG: 50 TABLET ORAL at 05:03

## 2024-10-26 RX ADMIN — DEXAMETHASONE 4 MG: 4 TABLET ORAL at 12:38

## 2024-10-26 RX ADMIN — ATORVASTATIN CALCIUM 20 MG: 20 TABLET, FILM COATED ORAL at 17:10

## 2024-10-26 RX ADMIN — ACETAMINOPHEN 650 MG: 325 TABLET, FILM COATED ORAL at 17:11

## 2024-10-26 RX ADMIN — LEVETIRACETAM 1000 MG: 100 INJECTION, SOLUTION INTRAVENOUS at 08:09

## 2024-10-26 RX ADMIN — Medication 6 MG: at 00:23

## 2024-10-26 RX ADMIN — DEXAMETHASONE 4 MG: 4 TABLET ORAL at 00:23

## 2024-10-26 NOTE — PLAN OF CARE
Problem: PAIN - ADULT  Goal: Verbalizes/displays adequate comfort level or baseline comfort level  Description: Interventions:  - Encourage patient to monitor pain and request assistance  - Assess pain using appropriate pain scale  - Administer analgesics based on type and severity of pain and evaluate response  - Implement non-pharmacological measures as appropriate and evaluate response  - Consider cultural and social influences on pain and pain management  - Notify physician/advanced practitioner if interventions unsuccessful or patient reports new pain  Outcome: Progressing     Problem: INFECTION - ADULT  Goal: Absence or prevention of progression during hospitalization  Description: INTERVENTIONS:  - Assess and monitor for signs and symptoms of infection  - Monitor lab/diagnostic results  - Monitor all insertion sites, i.e. indwelling lines, tubes, and drains  - Monitor endotracheal if appropriate and nasal secretions for changes in amount and color  - [place] appropriate cooling/warming therapies per order  - Administer medications as ordered  - Instruct and encourage patient and family to use good hand hygiene technique  - Identify and instruct in appropriate isolation precautions for identified infection/condition  Outcome: Progressing  Goal: Absence of fever/infection during neutropenic period  Description: INTERVENTIONS:  - Monitor WBC    Outcome: Progressing     Problem: SAFETY ADULT  Goal: Patient will remain free of falls  Description: INTERVENTIONS:  - Educate patient/family on patient safety including physical limitations  - Instruct patient to call for assistance with activity   - Consult OT/PT to assist with strengthening/mobility   - Keep Call bell within reach  - Keep bed low and locked with side rails adjusted as appropriate  - Keep care items and personal belongings within reach  - Initiate and maintain comfort rounds  - Make Fall Risk Sign visible to staff  - Offer Toileting every 2 Hours,  in advance of need  - Initiate/Maintain bed alarm  - Apply yellow socks and bracelet for high fall risk patients  - Consider moving patient to room near nurses station  Outcome: Progressing  Goal: Maintain or return to baseline ADL function  Description: INTERVENTIONS:  -  Assess patient's ability to carry out ADLs; assess patient's baseline for ADL function and identify physical deficits which impact ability to perform ADLs (bathing, care of mouth/teeth, toileting, grooming, dressing, etc.)  - Assess/evaluate cause of self-care deficits   - Assess range of motion  - Assess patient's mobility; develop plan if impaired  - Assess patient's need for assistive devices and provide as appropriate  - Encourage maximum independence but intervene and supervise when necessary  - Involve family in performance of ADLs  - Assess for home care needs following discharge   - Consider OT consult to assist with ADL evaluation and planning for discharge  - Provide patient education as appropriate  Outcome: Progressing  Goal: Maintains/Returns to pre admission functional level  Description: INTERVENTIONS:  - Perform AM-PAC 6 Click Basic Mobility/ Daily Activity assessment daily.  - Set and communicate daily mobility goal to care team and patient/family/caregiver.   - Collaborate with rehabilitation services on mobility goals if consulted  - Perform Range of Motion 3 times a day.  - Reposition patient every 2 hours.  - Dangle patient 3 times a day  - Stand patient 2 times a day  - Ambulate patient 3 times a day  - Out of bed to chair 2 times a day   - Out of bed for meals 3 times a day  - Out of bed for toileting  - Record patient progress and toleration of activity level   Outcome: Progressing     Problem: DISCHARGE PLANNING  Goal: Discharge to home or other facility with appropriate resources  Description: INTERVENTIONS:  - Identify barriers to discharge w/patient and caregiver  - Arrange for needed discharge resources and  transportation as appropriate  - Identify discharge learning needs (meds, wound care, etc.)  - Arrange for interpretive services to assist at discharge as needed  - Refer to Case Management Department for coordinating discharge planning if the patient needs post-hospital services based on physician/advanced practitioner order or complex needs related to functional status, cognitive ability, or social support system  Outcome: Progressing     Problem: Knowledge Deficit  Goal: Patient/family/caregiver demonstrates understanding of disease process, treatment plan, medications, and discharge instructions  Description: Complete learning assessment and assess knowledge base.  Interventions:  - Provide teaching at level of understanding  - Provide teaching via preferred learning methods  Outcome: Progressing     Problem: NEUROSENSORY - ADULT  Goal: Achieves stable or improved neurological status  Description: INTERVENTIONS  - Monitor and report changes in neurological status  - Monitor vital signs such as temperature, blood pressure, glucose, and any other labs ordered   - Initiate measures to prevent increased intracranial pressure  - Monitor for seizure activity and implement precautions if appropriate      Outcome: Progressing  Goal: Remains free of injury related to seizures activity  Description: INTERVENTIONS  - Maintain airway, patient safety  and administer oxygen as ordered  - Monitor patient for seizure activity, document and report duration and description of seizure to physician/advanced practitioner  - If seizure occurs,  ensure patient safety during seizure  - Reorient patient post seizure  - Seizure pads on all 4 side rails  - Instruct patient/family to notify RN of any seizure activity including if an aura is experienced  - Instruct patient/family to call for assistance with activity based on nursing assessment  - Administer anti-seizure medications if ordered    Outcome: Progressing  Goal: Achieves maximal  functionality and self care  Description: INTERVENTIONS  - Monitor swallowing and airway patency with patient fatigue and changes in neurological status  - Encourage and assist patient to increase activity and self care.   - Encourage visually impaired, hearing impaired and aphasic patients to use assistive/communication devices  Outcome: Progressing

## 2024-10-26 NOTE — PROGRESS NOTES
Progress Note - Internal Medicine   Name: Ayla Nye 74 y.o. female I MRN: [id number]  [facility] I Date of Admission: 10/21/2024   Date of Service: 10/26/2024 I Hospital Day: 5    Assessment & Plan  Metastatic cancer to brain (HCC)  Patient with history of non-small cell lung adenocarcinoma diagnosed in March 2024.  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  She completed SRS for these metastatic lesions in August 2024.  She is on Decadron in the outpatient setting and has had multiple admissions due to episodes of altered mental status with increased vasogenic edema after attempting to taper Decadron dosing.  She presented to [facility] on 10/20 with worsening confusion and a stroke alert was called.  Imaging showed no acute findings.  She was loaded with Keppra, started on Decadron, and transferred to [place] for video EEG and MRI brain under neurocritical care for close monitoring.  The morning of 10/21 patient's mental status reportedly much improved and she was deemed stable for transfer out of the ICU.    Symptoms likely in the setting of Decadron taper    Of note, patient is not exactly sure what her current Decadron dosing is.  With that said, it appears that she had been on a Decadron taper throughout September starting at 8 mg daily that was reduced each week.  Her most recent orders show Decadron 1 mg daily from 10/17-10/24 and then 0.5 mg daily for 8 more days    vEEG update as of today: 26 hours of recording. No seizures. Continues to have left hemisphere slowing, left posterior quadrant maximal. There are also left posterior temporal / parietal poorly formed (blunt and at times sharply contoured) LPDs occurring at 0.5-1 Hz. LPDs indicate high seizure risk and are often associated with acute/subacute destructive neuronal injury. Right hemisphere is essentially normal.     Blood cultures no growth at 72 hours    Plan:  - MRI  10/21 show stable vasogenic edema in the left parietotemporal region; previously demonstrated left posterior parietal lesion as well visualized likely due to differences in technique.  No new metastasis, no new acute infarct or hemorrhage.  - Neurosurgery, neurology, and palliative care consulted.  Appreciate assistance  - Per neurosurgery, no emergent neurosurgical intervention warranted  - Per neurology, continue Keppra 100 mg BID , okay to discharge with this dosage.  Also recommended Plavix or aspirin given right monocular inferior temporal vision for impairment at high risk of developing CV A. Follow-up with epilepsy clinic in 4 to 6 weeks  - Monitor on video EEG  - Reached out Dr. Gamboa, Rad-Onc and recommended slower taper:   Decadron 4mg PO every 6 hours for 5 days until 10/28/24  Decadron 4mg PO twice daily for 5 days from 10/29/24 to 11/2/24  Decadron 2mg PO twice daily for 5 days from 11/3/24 to 11/7/24  Decadron 2mg PO daily until her next MRI on 12/27 (she can try to self taper to 1mg during this interval if she wishes)   - PPI for gastric prophylaxis in setting of prolonged Decadron use; patient taking Decadron chronically prior to admission.  - Continue monitoring glucose while on dexamethasone therapy  - SBP goal <160  - Stat CT head with any neurological decline for return of altered mental status  - Neurochecks every 4 hours  - Seizure precautions in place  - PT/OT eval  Hypertension  Documented history of hypertension but not on any antihypertensives at home.  Blood pressure is acceptable this admission  Patient and chart review, patient usually taking multipin 5 mg and losartan 50 mg daily before being discontinued in June 2024    BP overnight high 120s/70s     Plan:  - Continue amlodipine 5 mg    Mixed hyperlipidemia  Lipid panel 7/20/2024: , TG 76, HDL 60,   ASCVD of 18.4%.  Not on any statin therapy per chart review    Plan:  - Continue Lipitor 20 mg daily  Malignant neoplasm of  upper lobe, right bronchus or lung (HCC)  Patient with a history of non-small cell lung cancer of the right upper lobe with metastasis to the brain diagnosed in March 2024.  She was on weekly carboplatin and paclitaxel with radiation from 5/23/2024 - 7/5/2024.  Further imaging after treatment showed signs concerning of recurrent metastatic NSCLC now with possible metastasis to the pelvis.  For this, it was recommended that she undergo systemic chemoimmunotherapy with carboplatin, pemetrexed, and pembrolizumab.  Her first cycle was on 10/7/2024.    Plan:  - Patient following closely with oncology in the outpatient setting  - Remainder of care for active concerns as mentioned above  - CT chest abdomen and pelvis revealed new small bilateral pulmonary nodules, likely metastasis, new right renal lesion(s) concerning for metastasis, significant interval enlargement of necrotic appearing mass in the right ischiorectal fossa, likely metastasis, deceasing RUL necrotic mass with stable and/or decreasing satellite nodularity, improved mediastinal and right hilar adenopathy, stable 2.4 cm CHUYITA groundglass density.  - Patient will f/u with heme-onc on 11/5/24    Hypothyroidism  Continue PTA levothyroxine 50 mcg daily  Palliative care patient  Palliative care following  Given GFR less than 60, as needed Toradol was discontinued today    Patient states that she would like to increase the dose of Tylenol if allowed, Also states that gabapentin reduces her pain to 2/10 and she is able to sleep better.     Plan:  - Palliative following. Continue Tylenol 650 mg every 6 hours  - Continue gabapentin 300 mg at bedtime  - Patient would like to avoid opioids if possible  Anemia  Hgb 8.9 and  on admission. 9/13/24 folate >22.3, B12 1,268. 9/5/23 MMA wnl. Patient has been having chronic macrocytic anemia since 9/2023.    Plan:  - Continue to monitor hgb daily  Cancer associated pain    Acute encephalopathy      Disposition: Subacute vs  home with home health. Pending OT     Team: SOD TEAM C    Subjective   Patient seen and examined. No acute events overnight.  Pain controlled.  Mood stable.  No nausea/vomiting/diarrhea.  Tolerating oral intake.  No Fever/chills.  No neurological symptoms.     Objective :  Temp:  [97.8 °F (36.6 °C)-99.3 °F (37.4 °C)] 97.9 °F (36.6 °C)  HR:  [61-71] 63  BP: (123-142)/(68-73) 142/73  Resp:  [14] 14  SpO2:  [94 %-96 %] 96 %    I/O         10/20 0701  10/21 0700 10/21 0701  10/22 0700    I.V. (mL/kg) 146.3 442.5 (8.5)    IV Piggyback  50    Total Intake(mL/kg) 146.3 492.5 (9.4)    Urine (mL/kg/hr) 650 1165 (0.9)    Total Output 650 1165    Net -503.8 -672.5          Unmeasured Urine Occurrence  1 x          Weights:        Body mass index is 21.85 kg/m².  Weight (last 2 days)       Date/Time Weight    10/26/24 0600 54.2 (119.49)    10/24/24 0600 49.9 (110.01)            Physical Exam  Vitals and nursing note reviewed.   Constitutional:       General: She is not in acute distress.     Appearance: She is well-developed.   HENT:      Head: Normocephalic and atraumatic.   Eyes:      General: No scleral icterus.     Extraocular Movements: Extraocular movements intact.      Conjunctiva/sclera: Conjunctivae normal.   Cardiovascular:      Rate and Rhythm: Normal rate and regular rhythm.      Pulses: Normal pulses.      Heart sounds: Normal heart sounds. No murmur heard.  Pulmonary:      Effort: Pulmonary effort is normal. No respiratory distress.      Comments: Decreased breath sounds in R side more prominent in RUL    Abdominal:      General: Bowel sounds are normal.      Palpations: Abdomen is soft.      Tenderness: There is no abdominal tenderness.   Musculoskeletal:         General: No swelling.      Cervical back: Neck supple.      Right lower leg: No edema.      Left lower leg: No edema.   Skin:     General: Skin is warm and dry.      Capillary Refill: Capillary refill takes less than 2 seconds.      Findings: No bruising  or lesion.   Neurological:      General: No focal deficit present.      Mental Status: She is alert.      Cranial Nerves: Cranial nerves 2-12 are intact.   Psychiatric:         Mood and Affect: Mood normal.           Lab Results: I have reviewed the following results:  Recent Labs     10/26/24  0513   WBC 10.05   HGB 9.6*   HCT 29.7*      SODIUM 140   K 4.1      CO2 25   BUN 25   CREATININE 0.80   GLUC 104       Imaging Results Review: I personally reviewed the following image studies in PACS and associated radiology reports: chest xray, CT head, CT A neck and head, and MRI brain. My interpretation of the radiology images/reports is:  .  Other Study Results Review: EKG was reviewed.     Currently Ordered Meds:   Current Facility-Administered Medications:     acetaminophen (TYLENOL) tablet 650 mg, Q6H KRISS    amLODIPine (NORVASC) tablet 5 mg, Daily    atorvastatin (LIPITOR) tablet 20 mg, Daily With Dinner    dexamethasone (DECADRON) tablet 4 mg, Q6H KRISS **FOLLOWED BY** [START ON 10/29/2024] dexamethasone (DECADRON) tablet 4 mg, Q12H KRISS **FOLLOWED BY** [START ON 11/3/2024] dexamethasone (DECADRON) tablet 2 mg, Q12H KRISS **FOLLOWED BY** [START ON 11/8/2024] dexamethasone (DECADRON) tablet 2 mg, Daily    enoxaparin (LOVENOX) subcutaneous injection 30 mg, Q24H KRISS    gabapentin (NEURONTIN) capsule 300 mg, HS    levETIRAcetam (KEPPRA) injection 1,000 mg, Q12H KRISS    levothyroxine tablet 50 mcg, Early Morning    melatonin tablet 6 mg, HS PRN    ondansetron (ZOFRAN) injection 4 mg, Q4H PRN    pantoprazole (PROTONIX) EC tablet 40 mg, Early Morning    senna-docusate sodium (SENOKOT S) 8.6-50 mg per tablet 2 tablet, BID    Facility-Administered Medications Ordered in Other Encounters:     fentaNYL injection, PRN    midazolam (VERSED) injection, PRN  VTE Pharmacologic Prophylaxis: Sequential compression device (Venodyne)  and Enoxaparin (Lovenox)  VTE Mechanical Prophylaxis: sequential compression  device    Administrative Statements   I have spent a total time of 45 minutes in caring for this patient on the day of the visit/encounter including Diagnostic results, Prognosis, Patient and family education, Importance of tx compliance, Counseling / Coordination of care, Documenting in the medical record, Reviewing / ordering tests, medicine, procedures  , and Obtaining or reviewing history  .  Portions of the record may have been created with voice recognition software.

## 2024-10-26 NOTE — PLAN OF CARE
Problem: SAFETY ADULT  Goal: Patient will remain free of falls  Description: INTERVENTIONS:  - Educate patient/family on patient safety including physical limitations  - Instruct patient to call for assistance with activity   - Consult OT/PT to assist with strengthening/mobility   - Keep Call bell within reach  - Keep bed low and locked with side rails adjusted as appropriate  - Keep care items and personal belongings within reach  - Initiate and maintain comfort rounds  - Make Fall Risk Sign visible to staff  - Offer Toileting every 2 Hours, in advance of need  - Initiate/Maintain bed alarm  - Apply yellow socks and bracelet for high fall risk patients  - Consider moving patient to room near nurses station  Outcome: Progressing     Problem: SAFETY ADULT  Goal: Maintain or return to baseline ADL function  Description: INTERVENTIONS:  -  Assess patient's ability to carry out ADLs; assess patient's baseline for ADL function and identify physical deficits which impact ability to perform ADLs (bathing, care of mouth/teeth, toileting, grooming, dressing, etc.)  - Assess/evaluate cause of self-care deficits   - Assess range of motion  - Assess patient's mobility; develop plan if impaired  - Assess patient's need for assistive devices and provide as appropriate  - Encourage maximum independence but intervene and supervise when necessary  - Involve family in performance of ADLs  - Assess for home care needs following discharge   - Consider OT consult to assist with ADL evaluation and planning for discharge  - Provide patient education as appropriate  Outcome: Progressing

## 2024-10-27 PROBLEM — D72.829 LEUKOCYTOSIS: Status: ACTIVE | Noted: 2024-10-27

## 2024-10-27 LAB
ANION GAP SERPL CALCULATED.3IONS-SCNC: 10 MMOL/L (ref 4–13)
ANISOCYTOSIS BLD QL SMEAR: PRESENT
BASOPHILS # BLD MANUAL: 0 THOUSAND/UL (ref 0–0.1)
BASOPHILS NFR MAR MANUAL: 0 % (ref 0–1)
BUN SERPL-MCNC: 26 MG/DL (ref 5–25)
CALCIUM SERPL-MCNC: 8.7 MG/DL (ref 8.4–10.2)
CHLORIDE SERPL-SCNC: 106 MMOL/L (ref 96–108)
CO2 SERPL-SCNC: 25 MMOL/L (ref 21–32)
CREAT SERPL-MCNC: 0.81 MG/DL (ref 0.6–1.3)
DACRYOCYTES BLD QL SMEAR: PRESENT
EOSINOPHIL # BLD MANUAL: 0 THOUSAND/UL (ref 0–0.4)
EOSINOPHIL NFR BLD MANUAL: 0 % (ref 0–6)
ERYTHROCYTE [DISTWIDTH] IN BLOOD BY AUTOMATED COUNT: 14.5 % (ref 11.6–15.1)
GFR SERPL CREATININE-BSD FRML MDRD: 71 ML/MIN/1.73SQ M
GLUCOSE SERPL-MCNC: 108 MG/DL (ref 65–140)
HCT VFR BLD AUTO: 30 % (ref 34.8–46.1)
HGB BLD-MCNC: 9.6 G/DL (ref 11.5–15.4)
LYMPHOCYTES # BLD AUTO: 0.35 THOUSAND/UL (ref 0.6–4.47)
LYMPHOCYTES # BLD AUTO: 2 % (ref 14–44)
MACROCYTES BLD QL AUTO: PRESENT
MCH RBC QN AUTO: 32.7 PG (ref 26.8–34.3)
MCHC RBC AUTO-ENTMCNC: 32 G/DL (ref 31.4–37.4)
MCV RBC AUTO: 102 FL (ref 82–98)
METAMYELOCYTE ABSOLUTE CT: 0.12 THOUSAND/UL (ref 0–0.1)
METAMYELOCYTES NFR BLD MANUAL: 1 % (ref 0–1)
MICROCYTES BLD QL AUTO: PRESENT
MONOCYTES # BLD AUTO: 0.69 THOUSAND/UL (ref 0–1.22)
MONOCYTES NFR BLD: 6 % (ref 4–12)
MYELOCYTE ABSOLUTE CT: 0.58 THOUSAND/UL (ref 0–0.1)
MYELOCYTES NFR BLD MANUAL: 5 % (ref 0–1)
NEUTROPHILS # BLD MANUAL: 9.78 THOUSAND/UL (ref 1.85–7.62)
NEUTS BAND NFR BLD MANUAL: 2 % (ref 0–8)
NEUTS SEG NFR BLD AUTO: 83 % (ref 43–75)
NRBC BLD AUTO-RTO: 2 /100 WBC (ref 0–2)
PLATELET # BLD AUTO: 321 THOUSANDS/UL (ref 149–390)
PLATELET BLD QL SMEAR: ADEQUATE
PMV BLD AUTO: 9 FL (ref 8.9–12.7)
POLYCHROMASIA BLD QL SMEAR: PRESENT
POTASSIUM SERPL-SCNC: 4.6 MMOL/L (ref 3.5–5.3)
RBC # BLD AUTO: 2.94 MILLION/UL (ref 3.81–5.12)
RBC MORPH BLD: PRESENT
SODIUM SERPL-SCNC: 141 MMOL/L (ref 135–147)
VARIANT LYMPHS # BLD AUTO: 1 %
WBC # BLD AUTO: 11.5 THOUSAND/UL (ref 4.31–10.16)

## 2024-10-27 PROCEDURE — 80048 BASIC METABOLIC PNL TOTAL CA: CPT

## 2024-10-27 PROCEDURE — 85007 BL SMEAR W/DIFF WBC COUNT: CPT

## 2024-10-27 PROCEDURE — 85027 COMPLETE CBC AUTOMATED: CPT

## 2024-10-27 PROCEDURE — 99232 SBSQ HOSP IP/OBS MODERATE 35: CPT | Performed by: INTERNAL MEDICINE

## 2024-10-27 RX ORDER — LIDOCAINE 50 MG/G
1 PATCH TOPICAL DAILY
Status: DISCONTINUED | OUTPATIENT
Start: 2024-10-27 | End: 2024-11-04 | Stop reason: HOSPADM

## 2024-10-27 RX ORDER — ACETAMINOPHEN 325 MG/1
650 TABLET ORAL ONCE
Status: COMPLETED | OUTPATIENT
Start: 2024-10-27 | End: 2024-10-27

## 2024-10-27 RX ADMIN — LEVETIRACETAM 1000 MG: 100 INJECTION, SOLUTION INTRAVENOUS at 09:00

## 2024-10-27 RX ADMIN — LEVOTHYROXINE SODIUM 50 MCG: 50 TABLET ORAL at 05:33

## 2024-10-27 RX ADMIN — Medication 2.5 MG: at 15:15

## 2024-10-27 RX ADMIN — DEXAMETHASONE 4 MG: 4 TABLET ORAL at 17:28

## 2024-10-27 RX ADMIN — AMLODIPINE BESYLATE 5 MG: 5 TABLET ORAL at 08:59

## 2024-10-27 RX ADMIN — Medication 6 MG: at 00:06

## 2024-10-27 RX ADMIN — ACETAMINOPHEN 650 MG: 325 TABLET, FILM COATED ORAL at 17:28

## 2024-10-27 RX ADMIN — DEXAMETHASONE 4 MG: 4 TABLET ORAL at 00:06

## 2024-10-27 RX ADMIN — DEXAMETHASONE 4 MG: 4 TABLET ORAL at 12:04

## 2024-10-27 RX ADMIN — DEXAMETHASONE 4 MG: 4 TABLET ORAL at 05:33

## 2024-10-27 RX ADMIN — ACETAMINOPHEN 650 MG: 325 TABLET, FILM COATED ORAL at 22:31

## 2024-10-27 RX ADMIN — ATORVASTATIN CALCIUM 20 MG: 20 TABLET, FILM COATED ORAL at 17:28

## 2024-10-27 RX ADMIN — SENNOSIDES AND DOCUSATE SODIUM 2 TABLET: 50; 8.6 TABLET ORAL at 17:28

## 2024-10-27 RX ADMIN — ACETAMINOPHEN 650 MG: 325 TABLET, FILM COATED ORAL at 05:33

## 2024-10-27 RX ADMIN — LEVETIRACETAM 1000 MG: 100 INJECTION, SOLUTION INTRAVENOUS at 22:32

## 2024-10-27 RX ADMIN — ACETAMINOPHEN 650 MG: 325 TABLET, FILM COATED ORAL at 00:06

## 2024-10-27 RX ADMIN — Medication 6 MG: at 23:00

## 2024-10-27 RX ADMIN — ACETAMINOPHEN 650 MG: 325 TABLET, FILM COATED ORAL at 12:04

## 2024-10-27 RX ADMIN — DEXAMETHASONE 4 MG: 4 TABLET ORAL at 23:00

## 2024-10-27 RX ADMIN — GABAPENTIN 300 MG: 300 CAPSULE ORAL at 22:32

## 2024-10-27 RX ADMIN — PANTOPRAZOLE SODIUM 40 MG: 40 TABLET, DELAYED RELEASE ORAL at 05:33

## 2024-10-27 NOTE — PLAN OF CARE
Problem: PAIN - ADULT  Goal: Verbalizes/displays adequate comfort level or baseline comfort level  Description: Interventions:  - Encourage patient to monitor pain and request assistance  - Assess pain using appropriate pain scale  - Administer analgesics based on type and severity of pain and evaluate response  - Implement non-pharmacological measures as appropriate and evaluate response  - Consider cultural and social influences on pain and pain management  - Notify physician/advanced practitioner if interventions unsuccessful or patient reports new pain  Outcome: Progressing     Problem: SAFETY ADULT  Goal: Patient will remain free of falls  Description: INTERVENTIONS:  - Educate patient/family on patient safety including physical limitations  - Instruct patient to call for assistance with activity   - Consult OT/PT to assist with strengthening/mobility   - Keep Call bell within reach  - Keep bed low and locked with side rails adjusted as appropriate  - Keep care items and personal belongings within reach  - Initiate and maintain comfort rounds  - Make Fall Risk Sign visible to staff  - Offer Toileting every 2 Hours, in advance of need  - Initiate/Maintain bed alarm  - Apply yellow socks and bracelet for high fall risk patients  - Consider moving patient to room near nurses station  Outcome: Progressing     Problem: NEUROSENSORY - ADULT  Goal: Achieves stable or improved neurological status  Description: INTERVENTIONS  - Monitor and report changes in neurological status  - Monitor vital signs such as temperature, blood pressure, glucose, and any other labs ordered   - Initiate measures to prevent increased intracranial pressure  - Monitor for seizure activity and implement precautions if appropriate      Outcome: Progressing

## 2024-10-27 NOTE — ASSESSMENT & PLAN NOTE
Hgb 8.9 and  on admission. 9/13/24 folate >22.3, B12 1,268. 9/5/23 MMA wnl. Patient has been having chronic macrocytic anemia since 9/2023.    Hgb this a.m. stable at 9.6    Plan:  - Continue to monitor hgb daily

## 2024-10-27 NOTE — ASSESSMENT & PLAN NOTE
Documented history of hypertension but not on any antihypertensives at home.  Blood pressure is acceptable this admission  Patient and chart review, patient usually taking multipin 5 mg and losartan 50 mg daily before being discontinued in June 2024    BP overnight 120s/70s    Plan:  - Continue amlodipine 5 mg

## 2024-10-27 NOTE — PROGRESS NOTES
Progress Note - Internal Medicine   Name: Ayla Nye 74 y.o. female I MRN: [id number]  [facility] I Date of Admission: 10/21/2024   Date of Service: 10/27/2024 I Hospital Day: 6    Assessment & Plan  Metastatic cancer to brain (HCC)  Patient with history of non-small cell lung adenocarcinoma diagnosed in March 2024.  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  She completed SRS for these metastatic lesions in August 2024.  She is on Decadron in the outpatient setting and has had multiple admissions due to episodes of altered mental status with increased vasogenic edema after attempting to taper Decadron dosing.  She presented to [facility] on 10/20 with worsening confusion and a stroke alert was called.  Imaging showed no acute findings.  She was loaded with Keppra, started on Decadron, and transferred to Central for video EEG and MRI brain under neurocritical care for close monitoring.  The morning of 10/21 patient's mental status reportedly much improved and she was deemed stable for transfer out of the ICU.    Symptoms likely in the setting of Decadron taper    Of note, patient is not exactly sure what her current Decadron dosing is.  With that said, it appears that she had been on a Decadron taper throughout September starting at 8 mg daily that was reduced each week.  Her most recent orders show Decadron 1 mg daily from 10/17-10/24 and then 0.5 mg daily for 8 more days    vEEG update as of today: 26 hours of recording. No seizures. Continues to have left hemisphere slowing, left posterior quadrant maximal. There are also left posterior temporal / parietal poorly formed (blunt and at times sharply contoured) LPDs occurring at 0.5-1 Hz. LPDs indicate high seizure risk and are often associated with acute/subacute destructive neuronal injury. Right hemisphere is essentially normal.     Blood cultures no growth at 72 hours    Plan:  - MRI  10/21 show stable vasogenic edema in the left parietotemporal region; previously demonstrated left posterior parietal lesion as well visualized likely due to differences in technique.  No new metastasis, no new acute infarct or hemorrhage.  - Neurosurgery, neurology, and palliative care consulted.  Appreciate assistance  - Per neurosurgery, no emergent neurosurgical intervention warranted  - Per neurology, continue Keppra 100 mg BID , okay to discharge with this dosage.  Also recommended Plavix or aspirin given right monocular inferior temporal vision for impairment at high risk of developing CV A. Follow-up with epilepsy clinic in 4 to 6 weeks  - Monitor on video EEG  - Reached out to Dr. Gamboa, Rad-Onc on 10/25 and recommended slower taper:   Decadron 4mg PO every 6 hours for 5 days until 10/28/24  Decadron 4mg PO twice daily for 5 days from 10/29/24 to 11/2/24  Decadron 2mg PO twice daily for 5 days from 11/3/24 to 11/7/24  Decadron 2mg PO daily until her next MRI on 12/27 (she can try to self taper to 1mg during this interval if she wishes)   - PPI for gastric prophylaxis in setting of prolonged Decadron use; patient taking Decadron chronically prior to admission.  - Continue monitoring glucose while on dexamethasone therapy  - SBP goal <160  - Stat CT head with any neurological decline for return of altered mental status  - Neurochecks every 4 hours  - Seizure precautions in place  - PT/OT eval  Hypertension  Documented history of hypertension but not on any antihypertensives at home.  Blood pressure is acceptable this admission  Patient and chart review, patient usually taking multipin 5 mg and losartan 50 mg daily before being discontinued in June 2024    BP overnight 120s/70s    Plan:  - Continue amlodipine 5 mg    Mixed hyperlipidemia  Lipid panel 7/20/2024: , TG 76, HDL 60,   ASCVD of 18.4%.  Not on any statin therapy per chart review    Plan:  - Continue Lipitor 20 mg daily  Malignant neoplasm  of upper lobe, right bronchus or lung (HCC)  Patient with a history of non-small cell lung cancer of the right upper lobe with metastasis to the brain diagnosed in March 2024.  She was on weekly carboplatin and paclitaxel with radiation from 5/23/2024 - 7/5/2024.  Further imaging after treatment showed signs concerning of recurrent metastatic NSCLC now with possible metastasis to the pelvis.  For this, it was recommended that she undergo systemic chemoimmunotherapy with carboplatin, pemetrexed, and pembrolizumab.  Her first cycle was on 10/7/2024.    Plan:  - Patient following closely with oncology in the outpatient setting  - Remainder of care for active concerns as mentioned above  - CT chest abdomen and pelvis revealed new small bilateral pulmonary nodules, likely metastasis, new right renal lesion(s) concerning for metastasis, significant interval enlargement of necrotic appearing mass in the right ischiorectal fossa, likely metastasis, deceasing RUL necrotic mass with stable and/or decreasing satellite nodularity, improved mediastinal and right hilar adenopathy, stable 2.4 cm CHUYITA groundglass density.  - Patient will f/u with heme-onc on 11/5/24    Hypothyroidism  Continue PTA levothyroxine 50 mcg daily  Palliative care patient  Palliative care following  Given GFR less than 60, as needed Toradol was discontinued today    Patient states that she would like to increase the dose of Tylenol if allowed, Also states that gabapentin reduces her pain to 2/10 and she is able to sleep better.     Plan:  - Palliative following. Continue Tylenol 650 mg every 6 hours  - Continue gabapentin 300 mg at bedtime  - Patient would like to avoid opioids if possible  Anemia  Hgb 8.9 and  on admission. 9/13/24 folate >22.3, B12 1,268. 9/5/23 MMA wnl. Patient has been having chronic macrocytic anemia since 9/2023.    Hgb this a.m. stable at 9.6    Plan:  - Continue to monitor hgb daily  Leukocytosis  WBC 11.5 from 10.0 10/26.  WBC has been increasing since admission of 3.0. Patient is on dexamethasone. Remains afebrile no URI and urinary symptoms. Afebrile, VSS. Low suspicions for infection at this time,     Plan:  - Most likely due to steroid  - Continue to monitor for signs/symptoms of infection      Disposition: Subacute vs home with home health.     Team: SOD TEAM C    Subjective   Patient seen and examined. No acute events overnight.  Pain controlled.  Mood stable. No fever, chills, chest pain/discomfort, abdominal pain, nausea/vomiting/diarrhea, and urinary symptoms. Tolerating oral intake. No neurological symptoms.     Objective :  Temp:  [97.3 °F (36.3 °C)-98.8 °F (37.1 °C)] 97.3 °F (36.3 °C)  HR:  [63-67] 67  BP: (129-145)/(73-80) 129/75  Resp:  [14-18] 18  SpO2:  [93 %-96 %] 93 %  O2 Device: None (Room air)    I/O         10/20 0701  10/21 0700 10/21 0701  10/22 0700    I.V. (mL/kg) 146.3 442.5 (8.5)    IV Piggyback  50    Total Intake(mL/kg) 146.3 492.5 (9.4)    Urine (mL/kg/hr) 650 1165 (0.9)    Total Output 650 1165    Net -503.8 -672.5          Unmeasured Urine Occurrence  1 x          Weights:        Body mass index is 21.85 kg/m².  Weight (last 2 days)       Date/Time Weight    10/26/24 0600 54.2 (119.49)            Physical Exam  Vitals and nursing note reviewed.   Constitutional:       General: She is not in acute distress.     Appearance: She is well-developed.   HENT:      Head: Normocephalic and atraumatic.   Eyes:      General: No scleral icterus.     Extraocular Movements: Extraocular movements intact.      Conjunctiva/sclera: Conjunctivae normal.   Cardiovascular:      Rate and Rhythm: Normal rate and regular rhythm.      Pulses: Normal pulses.      Heart sounds: Normal heart sounds. No murmur heard.  Pulmonary:      Effort: Pulmonary effort is normal. No respiratory distress.      Comments: Decreased breath sounds in R side more prominent in RUL    Abdominal:      General: Bowel sounds are normal.      Palpations:  Abdomen is soft.      Tenderness: There is no abdominal tenderness.   Musculoskeletal:         General: No swelling.      Cervical back: Neck supple.      Right lower leg: No edema.      Left lower leg: No edema.   Skin:     General: Skin is warm and dry.      Capillary Refill: Capillary refill takes less than 2 seconds.      Findings: No bruising or lesion.   Neurological:      General: No focal deficit present.      Mental Status: She is alert.      Cranial Nerves: Cranial nerves 2-12 are intact.   Psychiatric:         Mood and Affect: Mood normal.           Lab Results: I have reviewed the following results:  Recent Labs     10/26/24  0513   WBC 10.05   HGB 9.6*   HCT 29.7*      SODIUM 140   K 4.1      CO2 25   BUN 25   CREATININE 0.80   GLUC 104       Imaging Results Review: I personally reviewed the following image studies in PACS and associated radiology reports: chest xray, CT head, CT A neck and head, and MRI brain. My interpretation of the radiology images/reports is:  .  Other Study Results Review: EKG was reviewed.     Currently Ordered Meds:   Current Facility-Administered Medications:     acetaminophen (TYLENOL) tablet 650 mg, Q6H KRISS    amLODIPine (NORVASC) tablet 5 mg, Daily    atorvastatin (LIPITOR) tablet 20 mg, Daily With Dinner    dexamethasone (DECADRON) tablet 4 mg, Q6H KRISS **FOLLOWED BY** [START ON 10/29/2024] dexamethasone (DECADRON) tablet 4 mg, Q12H KRISS **FOLLOWED BY** [START ON 11/3/2024] dexamethasone (DECADRON) tablet 2 mg, Q12H KRISS **FOLLOWED BY** [START ON 11/8/2024] dexamethasone (DECADRON) tablet 2 mg, Daily    enoxaparin (LOVENOX) subcutaneous injection 30 mg, Q24H KRISS    gabapentin (NEURONTIN) capsule 300 mg, HS    levETIRAcetam (KEPPRA) injection 1,000 mg, Q12H KRISS    levothyroxine tablet 50 mcg, Early Morning    melatonin tablet 6 mg, HS PRN    ondansetron (ZOFRAN) injection 4 mg, Q4H PRN    pantoprazole (PROTONIX) EC tablet 40 mg, Early Morning    senna-docusate  sodium (SENOKOT S) 8.6-50 mg per tablet 2 tablet, BID    Facility-Administered Medications Ordered in Other Encounters:     fentaNYL injection, PRN    midazolam (VERSED) injection, PRN  VTE Pharmacologic Prophylaxis: Sequential compression device (Venodyne)  and Enoxaparin (Lovenox)  VTE Mechanical Prophylaxis: sequential compression device    Administrative Statements   I have spent a total time of 45 minutes in caring for this patient on the day of the visit/encounter including Diagnostic results, Prognosis, Patient and family education, Importance of tx compliance, Counseling / Coordination of care, Documenting in the medical record, Reviewing / ordering tests, medicine, procedures  , and Obtaining or reviewing history  .  Portions of the record may have been created with voice recognition software.

## 2024-10-27 NOTE — CASE MANAGEMENT
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     Palliative care patient 2024     Cancer associated pain 2024     Goals of care, counseling/discussion 2024     Right hip pain 09/10/2024     Altered mental status 2024     Hypothyroidism 2024     Abnormal imaging of central nervous system 2024     Acute metabolic encephalopathy 2024     Malignant neoplasm metastatic to brain (HCC) 2024     Brain mass 2024     Metastatic cancer to brain (HCC) 2024     Visual disturbance 2024     Dehydration 2024     Moderate protein-calorie malnutrition (HCC) 2024     Bilateral leg pain 2024     Insomnia 2024     Malignant neoplasm of upper lobe, right bronchus or lung (HCC) 2024     Age-related osteoporosis without current pathological fracture 2023     Encounter for monitoring of hydroxyurea therapy 2023     JAK2 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 2023     Hypertension 08/10/2022     Mixed hyperlipidemia 08/10/2022     Essential thrombocytosis (HCC) 2020     TB lung, 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     Psoriatic arthritis (HCC) 09/10/2019       LOS (days): 6  Geometric Mean LOS (GMLOS) (days): 2.8  Days to GMLOS:-3.5     OBJECTIVE:  Risk of Unplanned Readmission Score: 34.13         Current admission status: Inpatient   Preferred Pharmacy:   [id number]  [place] - [address] [place] [address]  Phone: [phone] Fax: [phone]    Primary Care  Provider: Rafy Angelo MD    Primary Insurance: MEDICARE  Secondary Insurance: AARP    DISCHARGE DETAILS:                                                                                                 Additional Comments: CM spoke with pt daughter Juliana. Juliana stated that she received the email from EROS EDWARDS RN. CM discussed waiver services and provided information. Juliana is aware that pt needs supervision 24/7 and med mngment however there is no family that are close to assist. CM will request that PT/OT see pt again and complete referrals. Unsure at this time if pt will transition to LTC. EROS explained the process for LTC and option process. Juliana stated she would like to speak with a provider, she seems to be getting different information from different providers. Pt recently had her last radiation treatment and is still receivieng immunotherapy at [facility] and . Juliana requested updates, she communicates to the family.CM to f/u

## 2024-10-27 NOTE — ASSESSMENT & PLAN NOTE
WBC 11.5 from 10.0 10/26. WBC has been increasing since admission of 3.0. Patient is on dexamethasone. Remains afebrile no URI and urinary symptoms. Afebrile, VSS. Low suspicions for infection at this time,     Plan:  - Most likely due to steroid  - Continue to monitor for signs/symptoms of infection

## 2024-10-27 NOTE — PROGRESS NOTES
Pt c/o back pain. Unable to give scheduled Tylenol at this time. SOD-C team made aware. New orders received. Will continue to monitor.

## 2024-10-27 NOTE — ASSESSMENT & PLAN NOTE
Patient with history of non-small cell lung adenocarcinoma diagnosed in March 2024.  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  She completed SRS for these metastatic lesions in August 2024.  She is on Decadron in the outpatient setting and has had multiple admissions due to episodes of altered mental status with increased vasogenic edema after attempting to taper Decadron dosing.  She presented to [facility] on 10/20 with worsening confusion and a stroke alert was called.  Imaging showed no acute findings.  She was loaded with Keppra, started on Decadron, and transferred to [place] for video EEG and MRI brain under neurocritical care for close monitoring.  The morning of 10/21 patient's mental status reportedly much improved and she was deemed stable for transfer out of the ICU.    Symptoms likely in the setting of Decadron taper    Of note, patient is not exactly sure what her current Decadron dosing is.  With that said, it appears that she had been on a Decadron taper throughout September starting at 8 mg daily that was reduced each week.  Her most recent orders show Decadron 1 mg daily from 10/17-10/24 and then 0.5 mg daily for 8 more days    vEEG update as of today: 26 hours of recording. No seizures. Continues to have left hemisphere slowing, left posterior quadrant maximal. There are also left posterior temporal / parietal poorly formed (blunt and at times sharply contoured) LPDs occurring at 0.5-1 Hz. LPDs indicate high seizure risk and are often associated with acute/subacute destructive neuronal injury. Right hemisphere is essentially normal.     Blood cultures no growth at 72 hours    Plan:  - MRI 10/21 show stable vasogenic edema in the left parietotemporal region; previously demonstrated left posterior parietal lesion as well visualized likely due to differences in technique.  No new metastasis, no new acute infarct or hemorrhage.  -  Neurosurgery, neurology, and palliative care consulted.  Appreciate assistance  - Per neurosurgery, no emergent neurosurgical intervention warranted  - Per neurology, continue Keppra 100 mg BID , okay to discharge with this dosage.  Also recommended Plavix or aspirin given right monocular inferior temporal vision for impairment at high risk of developing CV A. Follow-up with epilepsy clinic in 4 to 6 weeks  - Monitor on video EEG  - Reached out to Dr. Gamboa, Rad-Onc on 10/25 and recommended slower taper:   Decadron 4mg PO every 6 hours for 5 days until 10/28/24  Decadron 4mg PO twice daily for 5 days from 10/29/24 to 11/2/24  Decadron 2mg PO twice daily for 5 days from 11/3/24 to 11/7/24  Decadron 2mg PO daily until her next MRI on 12/27 (she can try to self taper to 1mg during this interval if she wishes)   - PPI for gastric prophylaxis in setting of prolonged Decadron use; patient taking Decadron chronically prior to admission.  - Continue monitoring glucose while on dexamethasone therapy  - SBP goal <160  - Stat CT head with any neurological decline for return of altered mental status  - Neurochecks every 4 hours  - Seizure precautions in place  - PT/OT eval

## 2024-10-28 ENCOUNTER — HOSPITAL ENCOUNTER (OUTPATIENT)
Dept: INFUSION CENTER | Facility: HOSPITAL | Age: 74
Discharge: HOME | End: 2024-10-28
Attending: INTERNAL MEDICINE

## 2024-10-28 LAB
ANION GAP SERPL CALCULATED.3IONS-SCNC: 12 MMOL/L (ref 4–13)
BUN SERPL-MCNC: 30 MG/DL (ref 5–25)
CALCIUM SERPL-MCNC: 8.5 MG/DL (ref 8.4–10.2)
CHLORIDE SERPL-SCNC: 106 MMOL/L (ref 96–108)
CO2 SERPL-SCNC: 23 MMOL/L (ref 21–32)
CREAT SERPL-MCNC: 0.8 MG/DL (ref 0.6–1.3)
ERYTHROCYTE [DISTWIDTH] IN BLOOD BY AUTOMATED COUNT: 14.5 % (ref 11.6–15.1)
GFR SERPL CREATININE-BSD FRML MDRD: 72 ML/MIN/1.73SQ M
GLUCOSE SERPL-MCNC: 125 MG/DL (ref 65–140)
HCT VFR BLD AUTO: 29 % (ref 34.8–46.1)
HGB BLD-MCNC: 9.4 G/DL (ref 11.5–15.4)
MCH RBC QN AUTO: 32.6 PG (ref 26.8–34.3)
MCHC RBC AUTO-ENTMCNC: 32.4 G/DL (ref 31.4–37.4)
MCV RBC AUTO: 101 FL (ref 82–98)
PLATELET # BLD AUTO: 280 THOUSANDS/UL (ref 149–390)
PMV BLD AUTO: 9.1 FL (ref 8.9–12.7)
POTASSIUM SERPL-SCNC: 4.2 MMOL/L (ref 3.5–5.3)
RBC # BLD AUTO: 2.88 MILLION/UL (ref 3.81–5.12)
SODIUM SERPL-SCNC: 141 MMOL/L (ref 135–147)
WBC # BLD AUTO: 11.75 THOUSAND/UL (ref 4.31–10.16)

## 2024-10-28 PROCEDURE — 99232 SBSQ HOSP IP/OBS MODERATE 35: CPT | Performed by: INTERNAL MEDICINE

## 2024-10-28 PROCEDURE — 80048 BASIC METABOLIC PNL TOTAL CA: CPT

## 2024-10-28 PROCEDURE — 85027 COMPLETE CBC AUTOMATED: CPT

## 2024-10-28 PROCEDURE — 97168 OT RE-EVAL EST PLAN CARE: CPT

## 2024-10-28 RX ORDER — LEVETIRACETAM 500 MG/1
1000 TABLET ORAL EVERY 12 HOURS SCHEDULED
Status: DISCONTINUED | OUTPATIENT
Start: 2024-10-28 | End: 2024-11-04 | Stop reason: HOSPADM

## 2024-10-28 RX ADMIN — DEXAMETHASONE 4 MG: 4 TABLET ORAL at 12:37

## 2024-10-28 RX ADMIN — LEVETIRACETAM 1000 MG: 500 TABLET, FILM COATED ORAL at 09:45

## 2024-10-28 RX ADMIN — AMLODIPINE BESYLATE 5 MG: 5 TABLET ORAL at 09:51

## 2024-10-28 RX ADMIN — ACETAMINOPHEN 650 MG: 325 TABLET, FILM COATED ORAL at 12:37

## 2024-10-28 RX ADMIN — DEXAMETHASONE 4 MG: 4 TABLET ORAL at 05:44

## 2024-10-28 RX ADMIN — DEXAMETHASONE 4 MG: 4 TABLET ORAL at 17:01

## 2024-10-28 RX ADMIN — LEVETIRACETAM 1000 MG: 500 TABLET, FILM COATED ORAL at 22:04

## 2024-10-28 RX ADMIN — LEVOTHYROXINE SODIUM 50 MCG: 50 TABLET ORAL at 05:44

## 2024-10-28 RX ADMIN — GABAPENTIN 300 MG: 300 CAPSULE ORAL at 22:04

## 2024-10-28 RX ADMIN — ACETAMINOPHEN 650 MG: 325 TABLET, FILM COATED ORAL at 17:01

## 2024-10-28 RX ADMIN — PANTOPRAZOLE SODIUM 40 MG: 40 TABLET, DELAYED RELEASE ORAL at 05:44

## 2024-10-28 RX ADMIN — ACETAMINOPHEN 650 MG: 325 TABLET, FILM COATED ORAL at 05:44

## 2024-10-28 RX ADMIN — ATORVASTATIN CALCIUM 20 MG: 20 TABLET, FILM COATED ORAL at 15:30

## 2024-10-28 NOTE — QUICK NOTE
10/28/2024 5:25 PM -  Ayla Nye's chart and case were reviewed by William Dixon DO.  Mode of review included electronic chart check.    Per review, symptoms remain controlled on current regimen and no changes are made at this time.  Please continue the regimen in place, and review our last note for details.  For dispo plan, please review Case Management notes.       Palliative care will return on Wednesday 10/30/2024      Patient is appropriate for outpatient at home [facility]SC follow-up.  We will make arrangements through our office.        For urgent issues or any questions/concerns, please notify on-call provider via [employer] Secure Chat.  You may also call our answering service 24/7 at [phone].    William Dixon DO  Palliative and Supportive Care  Clinic/Answering Service: [phone]  You can find me on [employer]!

## 2024-10-28 NOTE — PLAN OF CARE
Problem: PAIN - ADULT  Goal: Verbalizes/displays adequate comfort level or baseline comfort level  Description: Interventions:  - Encourage patient to monitor pain and request assistance  - Assess pain using appropriate pain scale  - Administer analgesics based on type and severity of pain and evaluate response  - Implement non-pharmacological measures as appropriate and evaluate response  - Consider cultural and social influences on pain and pain management  - Notify physician/advanced practitioner if interventions unsuccessful or patient reports new pain  Outcome: Progressing     Problem: NEUROSENSORY - ADULT  Goal: Achieves stable or improved neurological status  Description: INTERVENTIONS  - Monitor and report changes in neurological status  - Monitor vital signs such as temperature, blood pressure, glucose, and any other labs ordered   - Initiate measures to prevent increased intracranial pressure  - Monitor for seizure activity and implement precautions if appropriate      Outcome: Progressing     Problem: NEUROSENSORY - ADULT  Goal: Remains free of injury related to seizures activity  Description: INTERVENTIONS  - Maintain airway, patient safety  and administer oxygen as ordered  - Monitor patient for seizure activity, document and report duration and description of seizure to physician/advanced practitioner  - If seizure occurs,  ensure patient safety during seizure  - Reorient patient post seizure  - Seizure pads on all 4 side rails  - Instruct patient/family to notify RN of any seizure activity including if an aura is experienced  - Instruct patient/family to call for assistance with activity based on nursing assessment  - Administer anti-seizure medications if ordered    Outcome: Progressing

## 2024-10-28 NOTE — CASE MANAGEMENT
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     Palliative care patient 2024     Cancer associated pain 2024     Goals of care, counseling/discussion 2024     Right hip pain 09/10/2024     Altered mental status 2024     Hypothyroidism 2024     Abnormal imaging of central nervous system 2024     Acute metabolic encephalopathy 2024     Malignant neoplasm metastatic to brain (HCC) 2024     Brain mass 2024     Metastatic cancer to brain (HCC) 2024     Visual disturbance 2024     Dehydration 2024     Moderate protein-calorie malnutrition (HCC) 2024     Bilateral leg pain 2024     Insomnia 2024     Malignant neoplasm of upper lobe, right bronchus or lung (HCC) 2024     Age-related osteoporosis without current pathological fracture 2023     Encounter for monitoring of hydroxyurea therapy 2023     JAK2 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 2023     Hypertension 08/10/2022     Mixed hyperlipidemia 08/10/2022     Essential thrombocytosis (HCC) 2020     TB lung, 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     Psoriatic arthritis (HCC) 09/10/2019       LOS (days): 7  Geometric Mean LOS (GMLOS) (days): 4.9  Days to GMLOS:-2.6     OBJECTIVE:  Risk of Unplanned Readmission Score: 35.23         Current admission status: Inpatient   Preferred Pharmacy:   [facility] #[id number]  [place] - [address] [place] [address]  Phone: [phone] Fax: [phone]    Primary Care  Provider: Rafy Angelo MD    Primary Insurance: MEDICARE  Secondary Insurance: AARP    DISCHARGE DETAILS:    Discharge planning discussed with:: Patient              Additional Comments: CM spoke to patient regarding discharge planning- patient adamantly refuses [facility] or [facility].  OT re- evalusted patient with [facility]- still indicates the need for 24/7 supervision.  CM requested neuro-psych. consult be placed by provider to assess capacity.

## 2024-10-28 NOTE — ASSESSMENT & PLAN NOTE
Patient with history of non-small cell lung adenocarcinoma diagnosed in March 2024.  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  She completed SRS for these metastatic lesions in August 2024.  She is on Decadron in the outpatient setting and has had multiple admissions due to episodes of altered mental status with increased vasogenic edema after attempting to taper Decadron dosing.  She presented to [facility] on 10/20 with worsening confusion and a stroke alert was called.  Imaging showed no acute findings.  She was loaded with Keppra, started on Decadron, and transferred to [place] for video EEG and MRI brain under neurocritical care for close monitoring.  The morning of 10/21 patient's mental status reportedly much improved and she was deemed stable for transfer out of the ICU.    Symptoms likely in the setting of Decadron taper    Of note, patient is not exactly sure what her current Decadron dosing is.  With that said, it appears that she had been on a Decadron taper throughout September starting at 8 mg daily that was reduced each week.  Her most recent orders show Decadron 1 mg daily from 10/17-10/24 and then 0.5 mg daily for 8 more days    vEEG update as of today: 26 hours of recording. No seizures. Continues to have left hemisphere slowing, left posterior quadrant maximal. There are also left posterior temporal / parietal poorly formed (blunt and at times sharply contoured) LPDs occurring at 0.5-1 Hz. LPDs indicate high seizure risk and are often associated with acute/subacute destructive neuronal injury. Right hemisphere is essentially normal.     Blood cultures no growth at 72 hours    Plan:  - MRI 10/21 show stable vasogenic edema in the left parietotemporal region; previously demonstrated left posterior parietal lesion as well visualized likely due to differences in technique.  No new metastasis, no new acute infarct or hemorrhage.  -  Neurosurgery, neurology, and palliative care consulted.  Appreciate assistance  - Per neurosurgery, no emergent neurosurgical intervention warranted  - Per neurology, continue Keppra 100 mg BID , okay to discharge with this dosage.  Also recommended Plavix or aspirin given right monocular inferior temporal vision for impairment at high risk of developing CV A. Follow-up with epilepsy clinic in 4 to 6 weeks  - Monitor on video EEG  - Reached out to Dr. Gamboa, Rad-Onc on 10/25 and recommended slower taper:   Decadron 4mg PO every 6 hours for 5 days until 10/28/24  Decadron 4mg PO twice daily for 5 days from 10/29/24 to 11/2/24  Decadron 2mg PO twice daily for 5 days from 11/3/24 to 11/7/24  Decadron 2mg PO daily until her next MRI on 12/27 (she can try to self taper to 1mg during this interval if she wishes)   - PPI for gastric prophylaxis in setting of prolonged Decadron use; patient taking Decadron chronically prior to admission.  - Continue monitoring glucose while on dexamethasone therapy  - SBP goal <160  - Stat CT head with any neurological decline for return of altered mental status  - Neurochecks every 4 hours  - Seizure precautions in place  - Pending OT reeval, neuropsych consult placed for competency eval per CM

## 2024-10-28 NOTE — PROGRESS NOTES
Progress Note - Internal Medicine   Name: Ayla Nye 74 y.o. female I MRN: [id number]  [facility] Date of Admission: 10/21/2024   Date of Service: 10/28/2024 I Hospital Day: 7    Assessment & Plan  Metastatic cancer to brain (HCC)  Patient with history of non-small cell lung adenocarcinoma diagnosed in March 2024.  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  She completed SRS for these metastatic lesions in August 2024.  She is on Decadron in the outpatient setting and has had multiple admissions due to episodes of altered mental status with increased vasogenic edema after attempting to taper Decadron dosing.  She presented to [facility] on 10/20 with worsening confusion and a stroke alert was called.  Imaging showed no acute findings.  She was loaded with Keppra, started on Decadron, and transferred to [place] for video EEG and MRI brain under neurocritical care for close monitoring.  The morning of 10/21 patient's mental status reportedly much improved and she was deemed stable for transfer out of the ICU.    Symptoms likely in the setting of Decadron taper    Of note, patient is not exactly sure what her current Decadron dosing is.  With that said, it appears that she had been on a Decadron taper throughout September starting at 8 mg daily that was reduced each week.  Her most recent orders show Decadron 1 mg daily from 10/17-10/24 and then 0.5 mg daily for 8 more days    vEEG update as of today: 26 hours of recording. No seizures. Continues to have left hemisphere slowing, left posterior quadrant maximal. There are also left posterior temporal / parietal poorly formed (blunt and at times sharply contoured) LPDs occurring at 0.5-1 Hz. LPDs indicate high seizure risk and are often associated with acute/subacute destructive neuronal injury. Right hemisphere is essentially normal.     Blood cultures no growth at 72 hours    Plan:  - MRI  10/21 show stable vasogenic edema in the left parietotemporal region; previously demonstrated left posterior parietal lesion as well visualized likely due to differences in technique.  No new metastasis, no new acute infarct or hemorrhage.  - Neurosurgery, neurology, and palliative care consulted.  Appreciate assistance  - Per neurosurgery, no emergent neurosurgical intervention warranted  - Per neurology, continue Keppra 100 mg BID , okay to discharge with this dosage.  Also recommended Plavix or aspirin given right monocular inferior temporal vision for impairment at high risk of developing CV A. Follow-up with epilepsy clinic in 4 to 6 weeks  - Monitor on video EEG  - Reached out to Dr. Gamboa, Rad-Onc on 10/25 and recommended slower taper:   Decadron 4mg PO every 6 hours for 5 days until 10/28/24  Decadron 4mg PO twice daily for 5 days from 10/29/24 to 11/2/24  Decadron 2mg PO twice daily for 5 days from 11/3/24 to 11/7/24  Decadron 2mg PO daily until her next MRI on 12/27 (she can try to self taper to 1mg during this interval if she wishes)   - PPI for gastric prophylaxis in setting of prolonged Decadron use; patient taking Decadron chronically prior to admission.  - Continue monitoring glucose while on dexamethasone therapy  - SBP goal <160  - Stat CT head with any neurological decline for return of altered mental status  - Neurochecks every 4 hours  - Seizure precautions in place  - Pending OT reeval, neuropsych consult placed for competency eval per CM   Hypertension  Documented history of hypertension but not on any antihypertensives at home.  Blood pressure is acceptable this admission  Patient and chart review, patient usually taking multipin 5 mg and losartan 50 mg daily before being discontinued in June 2024    BP overnight 120s/70s    Plan:  - Continue amlodipine 5 mg    Mixed hyperlipidemia  Lipid panel 7/20/2024: , TG 76, HDL 60,   ASCVD of 18.4%.  Not on any statin therapy per chart  review    Plan:  - Continue Lipitor 20 mg daily  Malignant neoplasm of upper lobe, right bronchus or lung (HCC)  Patient with a history of non-small cell lung cancer of the right upper lobe with metastasis to the brain diagnosed in March 2024.  She was on weekly carboplatin and paclitaxel with radiation from 5/23/2024 - 7/5/2024.  Further imaging after treatment showed signs concerning of recurrent metastatic NSCLC now with possible metastasis to the pelvis.  For this, it was recommended that she undergo systemic chemoimmunotherapy with carboplatin, pemetrexed, and pembrolizumab.  Her first cycle was on 10/7/2024.    Plan:  - Patient following closely with oncology in the outpatient setting  - Remainder of care for active concerns as mentioned above  - CT chest abdomen and pelvis revealed new small bilateral pulmonary nodules, likely metastasis, new right renal lesion(s) concerning for metastasis, significant interval enlargement of necrotic appearing mass in the right ischiorectal fossa, likely metastasis, deceasing RUL necrotic mass with stable and/or decreasing satellite nodularity, improved mediastinal and right hilar adenopathy, stable 2.4 cm CHUYITA groundglass density.  - Patient will f/u with heme-onc on 11/5/24    Hypothyroidism  Continue PTA levothyroxine 50 mcg daily  Palliative care patient  Palliative care following  Given GFR less than 60, as needed Toradol was discontinued today    Patient states that she would like to increase the dose of Tylenol if allowed, Also states that gabapentin reduces her pain to 2/10 and she is able to sleep better.     Plan:  - Palliative following. Continue Tylenol 650 mg every 6 hours  - Continue gabapentin 300 mg at bedtime  - Patient would like to avoid opioids if possible  Anemia  Hgb 8.9 and  on admission. 9/13/24 folate >22.3, B12 1,268. 9/5/23 MMA wnl. Patient has been having chronic macrocytic anemia since 9/2023.    Hgb this a.m. stable at 9.4    Plan:  -  Continue to monitor hgb daily  Leukocytosis  WBC 11.75 from 10.0 10/26. WBC has been increasing since admission of 3.0. Patient is on dexamethasone. Remains afebrile no URI and urinary symptoms. Afebrile, VSS. Low suspicions for infection at this time,     Plan:  - Most likely due to steroid  - Continue to monitor for signs/symptoms of infection      Disposition: PT/OT reevaluation     Team: SOD TEAM KEL    Subjective   Patient seen and examined. No acute events overnight.  Patient received a dose of 2.5 mg oxycodone for back pain. She states her sore throat is improved today, no pain while swallowing. Patient is adamant about going home with home health.  Mood stable. No fever, chills, chest pain/discomfort, abdominal pain, nausea/vomiting/diarrhea, and urinary symptoms. Tolerating oral intake. No neurological symptoms.     Objective :  Temp:  [98.6 °F (37 °C)] 98.6 °F (37 °C)  HR:  [65] 65  BP: (136)/(76) 136/76  Resp:  [18] 18  SpO2:  [95 %] 95 %  O2 Device: None (Room air)    I/O         10/20 0701  10/21 0700 10/21 0701  10/22 0700    I.V. (mL/kg) 146.3 442.5 (8.5)    IV Piggyback  50    Total Intake(mL/kg) 146.3 492.5 (9.4)    Urine (mL/kg/hr) 650 1165 (0.9)    Total Output 650 1165    Net -503.8 -672.5          Unmeasured Urine Occurrence  1 x          Weights:        Body mass index is 21.98 kg/m².  Weight (last 2 days)       Date/Time Weight    10/27/24 0600 54.5 (120.15)    10/26/24 0600 54.2 (119.49)            Physical Exam  Vitals and nursing note reviewed.   Constitutional:       General: She is not in acute distress.     Appearance: She is well-developed.   HENT:      Head: Normocephalic and atraumatic.   Eyes:      General: No scleral icterus.     Extraocular Movements: Extraocular movements intact.      Conjunctiva/sclera: Conjunctivae normal.   Cardiovascular:      Rate and Rhythm: Normal rate and regular rhythm.      Pulses: Normal pulses.      Heart sounds: Normal heart sounds. No murmur  heard.  Pulmonary:      Effort: Pulmonary effort is normal. No respiratory distress.      Comments: Decreased breath sounds in R side more prominent in RUL    Abdominal:      General: Bowel sounds are normal.      Palpations: Abdomen is soft.      Tenderness: There is no abdominal tenderness.   Musculoskeletal:         General: No swelling.      Cervical back: Neck supple.      Right lower leg: No edema.      Left lower leg: No edema.   Skin:     General: Skin is warm and dry.      Capillary Refill: Capillary refill takes less than 2 seconds.      Findings: No bruising or lesion.   Neurological:      General: No focal deficit present.      Mental Status: She is alert.      Cranial Nerves: Cranial nerves 2-12 are intact.   Psychiatric:         Mood and Affect: Mood normal.           Lab Results: I have reviewed the following results:  Recent Labs     10/27/24  0549 10/28/24  0439   WBC 11.50* 11.75*   HGB 9.6* 9.4*   HCT 30.0* 29.0*    280   BANDSPCT 2  --    SODIUM 141 141   K 4.6 4.2    106   CO2 25 23   BUN 26* 30*   CREATININE 0.81 0.80   GLUC 108 125       Imaging Results Review: I personally reviewed the following image studies in PACS and associated radiology reports: chest xray, CT head, CT A neck and head, and MRI brain. My interpretation of the radiology images/reports is:  .  Other Study Results Review: EKG was reviewed.     Currently Ordered Meds:   Current Facility-Administered Medications:     acetaminophen (TYLENOL) tablet 650 mg, Q6H KRISS    amLODIPine (NORVASC) tablet 5 mg, Daily    atorvastatin (LIPITOR) tablet 20 mg, Daily With Dinner    dexamethasone (DECADRON) tablet 4 mg, Q6H KRISS **FOLLOWED BY** [START ON 10/29/2024] dexamethasone (DECADRON) tablet 4 mg, Q12H KRISS **FOLLOWED BY** [START ON 11/3/2024] dexamethasone (DECADRON) tablet 2 mg, Q12H KRISS **FOLLOWED BY** [START ON 11/8/2024] dexamethasone (DECADRON) tablet 2 mg, Daily    enoxaparin (LOVENOX) subcutaneous injection 30 mg, Q24H  KRISS    gabapentin (NEURONTIN) capsule 300 mg, HS    levETIRAcetam (KEPPRA) injection 1,000 mg, Q12H KRISS    levothyroxine tablet 50 mcg, Early Morning    lidocaine (LIDODERM) 5 % patch 1 patch, Daily    melatonin tablet 6 mg, HS PRN    ondansetron (ZOFRAN) injection 4 mg, Q4H PRN    pantoprazole (PROTONIX) EC tablet 40 mg, Early Morning    senna-docusate sodium (SENOKOT S) 8.6-50 mg per tablet 2 tablet, BID    Facility-Administered Medications Ordered in Other Encounters:     fentaNYL injection, PRN    midazolam (VERSED) injection, PRN  VTE Pharmacologic Prophylaxis: Sequential compression device (Venodyne)  and Enoxaparin (Lovenox)  VTE Mechanical Prophylaxis: sequential compression device    Administrative Statements   I have spent a total time of 45 minutes in caring for this patient on the day of the visit/encounter including Diagnostic results, Prognosis, Patient and family education, Importance of tx compliance, Counseling / Coordination of care, Documenting in the medical record, Reviewing / ordering tests, medicine, procedures  , and Obtaining or reviewing history  .  Portions of the record may have been created with voice recognition software.

## 2024-10-28 NOTE — ASSESSMENT & PLAN NOTE
WBC 11.75 from 10.0 10/26. WBC has been increasing since admission of 3.0. Patient is on dexamethasone. Remains afebrile no URI and urinary symptoms. Afebrile, VSS. Low suspicions for infection at this time,     Plan:  - Most likely due to steroid  - Continue to monitor for signs/symptoms of infection

## 2024-10-28 NOTE — ASSESSMENT & PLAN NOTE
Lipid panel 7/20/2024: , TG 76, HDL 60,   ASCVD of 18.4%.  Not on any statin therapy per chart review    Plan:  - Continue Lipitor 20 mg daily   Concentration Of Solution Injected (Mg/Ml): 5.0

## 2024-10-28 NOTE — PLAN OF CARE
Problem: SAFETY ADULT  Goal: Patient will remain free of falls  Description: INTERVENTIONS:  - Educate patient/family on patient safety including physical limitations  - Instruct patient to call for assistance with activity   - Consult OT/PT to assist with strengthening/mobility   - Keep Call bell within reach  - Keep bed low and locked with side rails adjusted as appropriate  - Keep care items and personal belongings within reach  - Initiate and maintain comfort rounds  - Make Fall Risk Sign visible to staff  - Offer Toileting every 2 Hours, in advance of need  - Initiate/Maintain bed alarm  - Apply yellow socks and bracelet for high fall risk patients  - Consider moving patient to room near nurses station  Outcome: Progressing     Problem: DISCHARGE PLANNING  Goal: Discharge to home or other facility with appropriate resources  Description: INTERVENTIONS:  - Identify barriers to discharge w/patient and caregiver  - Arrange for needed discharge resources and transportation as appropriate  - Identify discharge learning needs (meds, wound care, etc.)  - Arrange for interpretive services to assist at discharge as needed  - Refer to Case Management Department for coordinating discharge planning if the patient needs post-hospital services based on physician/advanced practitioner order or complex needs related to functional status, cognitive ability, or social support system  Outcome: Progressing

## 2024-10-28 NOTE — OCCUPATIONAL THERAPY NOTE
Occupational Therapy Re-Evaluation     Patient Name: Ayla Nye  Today's Date: 10/28/2024  Problem List  Principal Problem:    Metastatic cancer to brain (HCC)  Active Problems:    Hypertension    Mixed hyperlipidemia    Malignant neoplasm of upper lobe, right bronchus or lung (HCC)    Hypothyroidism    Cancer associated pain    Palliative care patient    Acute encephalopathy    Anemia    Leukocytosis    Past Medical History  Past Medical History:   Diagnosis Date    Allergic 2022    Allergic to neomiacin and cephalexin    Cancer (HCC)     lung cancer    Cancer (HCC)     mets to brain    Cancer (HCC)     perianal mass    Cataract Nov. 2023    Due to have durgery June 2024    Essential thrombocytosis (HCC)     controlled with medication    Hyperlipidemia     Hypertension     Mass in chest     Nodular goiter     Osteoporosis     Pneumonia Oct 16, 2023    Also  Feb 2024    Psoriasis     SIRS (systemic inflammatory response syndrome) (HCC) 06/22/2024     Past Surgical History  Past Surgical History:   Procedure Laterality Date    APPENDECTOMY      BREAST CYST EXCISION Right 2009    COLONOSCOPY  10/31/2018    DXA PROCEDURE (HISTORICAL)  04/21/2017    IR BIOPSY OTHER  9/12/2024    IR LUMBAR PUNCTURE  9/12/2024    MAMMO (HISTORICAL)      8-24-18    MAMMO NEEDLE LOCALIZATION RIGHT (ALL INC) Right 07/13/2009    SKIN LESION EXCISION      bridge of nose         10/28/24 1430   OT Last Visit   OT Visit Date 10/28/24   Note Type   Note type Re-Evaluation   Pain Assessment   Pain Assessment Tool 0-10   Pain Score No Pain   Restrictions/Precautions   Weight Bearing Precautions Per Order No   Other Precautions Cognitive;Chair Alarm;Bed Alarm;Telemetry;Fall Risk  (Cognitive deficits)   Home Living   Type of Home House   Home Layout Two level;Bed/bath upstairs   Bathroom Shower/Tub Tub/shower unit   Bathroom Toilet Standard   Bathroom Equipment Grab bars in shower   Prior Function   Level of [place] Independent with  ADLs;Independent with functional mobility;Needs assistance with IADLS   Lives With Alone   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   IADLs Independent with meal prep;Independent with medication management;Family/Friend/Other provides transportation   Falls in the last 6 months 0   Vocational Retired   Lifestyle   Autonomy I adls and mobility w/o ad - denies falls - family/neighbor assists with iadls/transportation   Reciprocal Relationships supportive family who check on pt regularly, neighbors   Service to Others retired   Intrinsic Gratification staying active   Activity Tolerance   Activity Tolerance Patient tolerated treatment well   Medical Staff Made Aware CM/Physician for discharge planning   Cognition   Overall Cognitive Status Impaired   Arousal/Participation Alert;Responsive;Cooperative   Attention Attends with cues to redirect   Orientation Level Oriented X4   Memory Decreased recall of precautions;Decreased recall of recent events;Decreased short term memory   Following Commands Follows multistep commands with increased time or repetition   Comments New order received 2* pt concern with OT Recommendation of 24/7 S/assistance upon discharge. pt demonstrating decreased understanding of cognitive deficits and long term affects of diagnosis/prognosis with functional cognitive [place] levels, and requesting new OT consult. Pt and ex-spouse educated extensively on  ACLS/MOCA assessments and  indications with score and translation to every day life, errors that could occur especially with medical concerns. Pt demonstrating decreased insight, judgement and reasoning with recommendation.pt's spouse reports able to check on pt several times a day to ensure safety unable to provided 24 hour supervision.   Recommendation  at this time is Neuropysch evaluation, which was ordered to determine medical competency and safety to return home alone.    Cognition Assessment Tools ACLS -standardized & evidence based assessment   Score  3.4 on 10//25  indicating 24/7 S/assistance  MOCA performed  on 10/23 with a score of 20/30 indicating mild deficits deficits with attention/problem solving/delayed recall   Assessment   Assessment Pt seen for a re-evaluation 2* pt concerns for OT/cognitive recommendation. Pt is a 74 y.o. female who was admitted to [facility] on 10/21/2024 with confusion,  Metastatic cancer to brain (HCC) .,+upper lobe lung neoplasm, acute encephalopathy Patient  has a past medical history of Allergic (2022), Cancer (HCC), Cancer (HCC), Cancer (HCC), Cataract (Nov. 2023), Essential thrombocytosis (HCC), Hyperlipidemia, Hypertension, Mass in chest, Nodular goiter, Osteoporosis, Pneumonia (Oct 16, 2023), Psoriasis, and SIRS (systemic inflammatory response syndrome) ([facility]) (06/22/2024).   At baseline pt was completing ADL's/IaDL's I , ex-spouse drives. Pt lives alone see IE. Currently pt requires S/set-up for overall ADLS and S  for functional mobility/transfers last session, this re-evaluation focus on family/pt education on safety/cogntive concerns. Pt currently presents with impairments in the following categories -limited home support, difficulty performing IADLS , limited insight into deficits, and compliance memory, insight, safety , and judgement . These impairments, as well as pt's fatigue, decreased caregiver support, and risk for falls  limit pt's ability to safely engage in all baseline areas of occupation, includingcommunity mobility, laundry , house maintenance, medication management, meal prep, cleaning, social participation , and leisure activities  From OT standpoint, recommend min level III with focus on cognition/safety upon D/C. The patient's raw score on the AM-PAC Daily Activity Inpatient Short Form is 20. A raw score of less than 19 suggests the patient may benefit from discharge to home. Please refer to the recommendation of the Occupational Therapist for safe discharge planning.  No further acute OT  needs indicated at this time - Anticipate d/c home with family support when medically cleared - d/c from caseload   Discharge Recommendation   Rehab Resource Intensity Level, OT III (Minimum Resource Intensity)  (24/7 S/assistance and outpatient OT with focus on cognition)   Equipment Recommended Shower/Tub chair with back ($)   AM-PAC Daily Activity Inpatient   Lower Body Dressing 3   Bathing 3   Toileting 3   Upper Body Dressing 3   Grooming 4   Eating 4   Daily Activity Raw Score 20   Daily Activity Standardized Score (Calc for Raw Score >=11) 42.03   AM-PAC Applied Cognition Inpatient   Following a Speech/Presentation 2   Understanding Ordinary Conversation 4   Taking Medications 2   Remembering Where Things Are Placed or Put Away 3   Remembering List of 4-5 Errands 3   Taking Care of Complicated Tasks 2   Applied Cognition Raw Score 16   Applied Cognition Standardized Score 35.03   Gume Cognitive Level   Gume Cognitive Score Recommendation (S)  24 hour assistance   End of Consult   Education Provided Yes  (Education on cogntive evaluations performed and recommendation of 24/7 S/assistance, outpatient OT, possible discharge plans to ensure pt safety, home safety/fall risk prevention strategies.)   Patient Position at End of Consult Supine;Bed/Chair alarm activated;All needs within reach   Nurse Communication Nurse aware of consult     Miley Mc OTR/L

## 2024-10-28 NOTE — PROGRESS NOTES
Father Nestor anointed the patient and gave a blessing with prayer.    10/28/24 0900   Clinical Encounter Type   Visited With Patient   Buddhism Encounters   Buddhism Needs Prayer   Sacramental Encounters   Sacrament of Sick-Anointing Anointed

## 2024-10-28 NOTE — ASSESSMENT & PLAN NOTE
Hgb 8.9 and  on admission. 9/13/24 folate >22.3, B12 1,268. 9/5/23 MMA wnl. Patient has been having chronic macrocytic anemia since 9/2023.    Hgb this a.m. stable at 9.4    Plan:  - Continue to monitor hgb daily

## 2024-10-29 PROCEDURE — 99232 SBSQ HOSP IP/OBS MODERATE 35: CPT | Performed by: INTERNAL MEDICINE

## 2024-10-29 RX ADMIN — LEVOTHYROXINE SODIUM 50 MCG: 50 TABLET ORAL at 05:24

## 2024-10-29 RX ADMIN — LEVETIRACETAM 1000 MG: 500 TABLET, FILM COATED ORAL at 10:28

## 2024-10-29 RX ADMIN — DEXAMETHASONE 4 MG: 4 TABLET ORAL at 17:18

## 2024-10-29 RX ADMIN — ACETAMINOPHEN 650 MG: 325 TABLET, FILM COATED ORAL at 17:19

## 2024-10-29 RX ADMIN — ACETAMINOPHEN 650 MG: 325 TABLET, FILM COATED ORAL at 05:24

## 2024-10-29 RX ADMIN — Medication 6 MG: at 00:17

## 2024-10-29 RX ADMIN — GABAPENTIN 300 MG: 300 CAPSULE ORAL at 21:10

## 2024-10-29 RX ADMIN — DEXAMETHASONE 4 MG: 4 TABLET ORAL at 05:24

## 2024-10-29 RX ADMIN — SENNOSIDES AND DOCUSATE SODIUM 2 TABLET: 50; 8.6 TABLET ORAL at 17:19

## 2024-10-29 RX ADMIN — PANTOPRAZOLE SODIUM 40 MG: 40 TABLET, DELAYED RELEASE ORAL at 05:24

## 2024-10-29 RX ADMIN — AMLODIPINE BESYLATE 5 MG: 5 TABLET ORAL at 10:27

## 2024-10-29 RX ADMIN — ACETAMINOPHEN 650 MG: 325 TABLET, FILM COATED ORAL at 12:02

## 2024-10-29 RX ADMIN — LEVETIRACETAM 1000 MG: 500 TABLET, FILM COATED ORAL at 21:10

## 2024-10-29 RX ADMIN — ACETAMINOPHEN 650 MG: 325 TABLET, FILM COATED ORAL at 00:17

## 2024-10-29 RX ADMIN — ENOXAPARIN SODIUM 30 MG: 30 INJECTION SUBCUTANEOUS at 10:22

## 2024-10-29 RX ADMIN — ATORVASTATIN CALCIUM 20 MG: 20 TABLET, FILM COATED ORAL at 17:19

## 2024-10-29 RX ADMIN — SENNOSIDES AND DOCUSATE SODIUM 1 TABLET: 50; 8.6 TABLET ORAL at 10:27

## 2024-10-29 NOTE — CASE MANAGEMENT
Case Management Discharge Planning Note    Patient name Ayla Nye  Location 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     Palliative care patient 2024     Cancer associated pain 2024     Goals of care, counseling/discussion 2024     Right hip pain 09/10/2024     Altered mental status 2024     Hypothyroidism 2024     Abnormal imaging of central nervous system 2024     Acute metabolic encephalopathy 2024     Malignant neoplasm metastatic to brain (HCC) 2024     Brain mass 2024     Metastatic cancer to brain (HCC) 2024     Visual disturbance 2024     Dehydration 2024     Moderate protein-calorie malnutrition (HCC) 2024     Bilateral leg pain 2024     Insomnia 2024     Malignant neoplasm of upper lobe, right bronchus or lung (HCC) 2024     Age-related osteoporosis without current pathological fracture 2023     Encounter for monitoring of hydroxyurea therapy 2023     JAK2 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 2023     Hypertension 08/10/2022     Mixed hyperlipidemia 08/10/2022     Essential thrombocytosis (HCC) 2020     TB lung, 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     Psoriatic arthritis (HCC) 09/10/2019       LOS (days): 8  Geometric Mean LOS (GMLOS) (days): 4.9  Days to GMLOS:-3.4     OBJECTIVE:  Risk of Unplanned Readmission Score: 35.21         Current admission status: Inpatient   Preferred Pharmacy:   [id number]  [place] - [address] [place] [address]  Phone: [phone] Fax: [phone]    Primary Care  Provider: Rafy Angelo MD    Primary Insurance: MEDICARE  Secondary Insurance: [employer]    DISCHARGE DETAILS:    Discharge planning discussed with:: Patient, daughter Juliana             Other Referral/Resources/Interventions Provided:  Referral Comments: Patient's daughter would like patient to go to to [facility] temporarily until more permanent arrangements can be made (for patient to receive supervision at home) Patient is agreeable. [facility] referral placed in [facility].

## 2024-10-29 NOTE — PROGRESS NOTES
Progress Note - Internal Medicine   Name: Ayla Nye 74 y.o. female I MRN: [id number]  [facility] I Date of Admission: 10/21/2024   Date of Service: 10/29/2024 I Hospital Day: 8    Assessment & Plan  Metastatic cancer to brain (HCC)  Patient with history of non-small cell lung adenocarcinoma diagnosed in March 2024.  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  She completed SRS for these metastatic lesions in August 2024.  She is on Decadron in the outpatient setting and has had multiple admissions due to episodes of altered mental status with increased vasogenic edema after attempting to taper Decadron dosing.  She presented to [facility] on 10/20 with worsening confusion and a stroke alert was called.  Imaging showed no acute findings.  She was loaded with Keppra, started on Decadron, and transferred to [facility] for video EEG and MRI brain under neurocritical care for close monitoring.  The morning of 10/21 patient's mental status reportedly much improved and she was deemed stable for transfer out of the ICU.    Symptoms likely in the setting of Decadron taper    Of note, patient is not exactly sure what her current Decadron dosing is.  With that said, it appears that she had been on a Decadron taper throughout September starting at 8 mg daily that was reduced each week.  Her most recent orders show Decadron 1 mg daily from 10/17-10/24 and then 0.5 mg daily for 8 more days    vEEG update as of today: 26 hours of recording. No seizures. Continues to have left hemisphere slowing, left posterior quadrant maximal. There are also left posterior temporal / parietal poorly formed (blunt and at times sharply contoured) LPDs occurring at 0.5-1 Hz. LPDs indicate high seizure risk and are often associated with acute/subacute destructive neuronal injury. Right hemisphere is essentially normal.     Blood cultures no growth at 72 hours    Plan:  - MRI  10/21 show stable vasogenic edema in the left parietotemporal region; previously demonstrated left posterior parietal lesion as well visualized likely due to differences in technique.  No new metastasis, no new acute infarct or hemorrhage.  - Neurosurgery, neurology, and palliative care consulted.  Appreciate assistance  - Per neurosurgery, no emergent neurosurgical intervention warranted  - Per neurology, continue Keppra 100 mg BID , okay to discharge with this dosage.  Also recommended Plavix or aspirin given right monocular inferior temporal vision for impairment at high risk of developing CV A. Follow-up with epilepsy clinic in 4 to 6 weeks  - Monitor on video EEG  - Reached out to Dr. Gamboa, Rad-Onc on 10/25 and recommended slower taper:  Decadron 4mg PO twice daily for 5 days from 10/29/24 to 11/2/24  Decadron 2mg PO twice daily for 5 days from 11/3/24 to 11/7/24  Decadron 2mg PO daily until her next MRI on 12/27 (she can try to self taper to 1mg during this interval if she wishes)   - PPI for gastric prophylaxis in setting of prolonged Decadron use; patient taking Decadron chronically prior to admission.  - Continue monitoring glucose while on dexamethasone therapy  - SBP goal <160  - Stat CT head with any neurological decline for return of altered mental status  - Seizure precautions in place  - Per Neuropsych, patient has the capacity to make informed medical decisions  - Per CM, daughter would like patient to go to [facility] until more permanent arrangements can be made. And patient agrees.  Pending STR  Hypertension  Documented history of hypertension but not on any antihypertensives at home.  Blood pressure is acceptable this admission  Patient and chart review, patient usually taking multipin 5 mg and losartan 50 mg daily before being discontinued in June 2024    BP overnight 120s/70s    Plan:  - Continue amlodipine 5 mg    Mixed hyperlipidemia  Lipid panel 7/20/2024: , TG 76, HDL 60,   ASCVD of  18.4%.  Not on any statin therapy per chart review    Plan:  - Continue Lipitor 20 mg daily  Malignant neoplasm of upper lobe, right bronchus or lung (HCC)  Patient with a history of non-small cell lung cancer of the right upper lobe with metastasis to the brain diagnosed in March 2024.  She was on weekly carboplatin and paclitaxel with radiation from 5/23/2024 - 7/5/2024.  Further imaging after treatment showed signs concerning of recurrent metastatic NSCLC now with possible metastasis to the pelvis.  For this, it was recommended that she undergo systemic chemoimmunotherapy with carboplatin, pemetrexed, and pembrolizumab.  Her first cycle was on 10/7/2024.    Plan:  - Patient following closely with oncology in the outpatient setting  - Remainder of care for active concerns as mentioned above  - CT chest abdomen and pelvis revealed new small bilateral pulmonary nodules, likely metastasis, new right renal lesion(s) concerning for metastasis, significant interval enlargement of necrotic appearing mass in the right ischiorectal fossa, likely metastasis, deceasing RUL necrotic mass with stable and/or decreasing satellite nodularity, improved mediastinal and right hilar adenopathy, stable 2.4 cm CHUYITA groundglass density.  - Patient will f/u with heme-onc on 11/5/24    Hypothyroidism  Continue PTA levothyroxine 50 mcg daily  Palliative care patient  Palliative care following  Given GFR less than 60, as needed Toradol was discontinued today    Patient states that she would like to increase the dose of Tylenol if allowed, Also states that gabapentin reduces her pain to 2/10 and she is able to sleep better.     Plan:  - Palliative following and will return on 10/30   - Continue Tylenol 650 mg every 6 hours  - Continue gabapentin 300 mg at bedtime  - Patient would like to avoid opioids if possible  Anemia  Hgb 8.9 and  on admission. 9/13/24 folate >22.3, B12 1,268. 9/5/23 MMA wnl. Patient has been having chronic  macrocytic anemia since 9/2023.    Plan:  - Continue to monitor hgb daily  Leukocytosis  WBC 11.75 from 10.0 10/26. WBC has been increasing since admission of 3.0. Patient is on dexamethasone. Remains afebrile no URI and urinary symptoms. Afebrile, VSS. Low suspicions for infection at this time,     Plan:  - Most likely due to steroid  - Continue to monitor for signs/symptoms of infection      Disposition: Pending STR    Team: SOD TEAM C    Subjective   Patient seen and examined. No acute events overnight.  Patient complaints of sore throat without cough, watery eyes, and rhinorrhea. Able to tolerate PO. Patient states she discussed with family and agreed to go to STR. No fever, chills, chest pain/discomfort, abdominal pain, nausea/vomiting/diarrhea, and urinary symptoms. Last bowel movement was yesterday. No neurological symptoms.     Objective :  Temp:  [98.6 °F (37 °C)] 98.6 °F (37 °C)  HR:  [72-73] 72  BP: (124-129)/(69-84) 124/69  Resp:  [18] 18  SpO2:  [93 %-96 %] 96 %  O2 Device: None (Room air)    I/O         10/20 0701  10/21 0700 10/21 0701  10/22 0700    I.V. (mL/kg) 146.3 442.5 (8.5)    IV Piggyback  50    Total Intake(mL/kg) 146.3 492.5 (9.4)    Urine (mL/kg/hr) 650 1165 (0.9)    Total Output 650 1165    Net -503.8 -672.5          Unmeasured Urine Occurrence  1 x          Weights:        Body mass index is 21.77 kg/m².  Weight (last 2 days)       Date/Time Weight    10/29/24 0539 54 (119.05)    10/28/24 0600 54.5 (120.15)    10/27/24 0600 54.5 (120.15)            Physical Exam  Vitals and nursing note reviewed.   Constitutional:       General: She is not in acute distress.     Appearance: She is well-developed.   HENT:      Head: Normocephalic and atraumatic.      Mouth/Throat:      Mouth: Mucous membranes are moist.      Pharynx: No oropharyngeal exudate or posterior oropharyngeal erythema.   Eyes:      General: No scleral icterus.     Extraocular Movements: Extraocular movements intact.       Conjunctiva/sclera: Conjunctivae normal.   Cardiovascular:      Rate and Rhythm: Normal rate and regular rhythm.      Pulses: Normal pulses.      Heart sounds: Normal heart sounds. No murmur heard.  Pulmonary:      Effort: Pulmonary effort is normal. No respiratory distress.      Comments: Decreased breath sounds in R side more prominent in RUL    Abdominal:      General: Bowel sounds are normal.      Palpations: Abdomen is soft.      Tenderness: There is no abdominal tenderness.   Musculoskeletal:         General: No swelling.      Cervical back: Neck supple.      Right lower leg: No edema.      Left lower leg: No edema.   Lymphadenopathy:      Cervical: No cervical adenopathy.   Skin:     General: Skin is warm and dry.      Capillary Refill: Capillary refill takes less than 2 seconds.      Findings: No bruising or lesion.   Neurological:      General: No focal deficit present.      Mental Status: She is alert.      Cranial Nerves: Cranial nerves 2-12 are intact. No cranial nerve deficit.      Motor: No weakness.   Psychiatric:         Mood and Affect: Mood normal.           Lab Results: I have reviewed the following results:  Recent Labs     10/27/24  0549 10/28/24  0439   WBC 11.50* 11.75*   HGB 9.6* 9.4*   HCT 30.0* 29.0*    280   BANDSPCT 2  --    SODIUM 141 141   K 4.6 4.2    106   CO2 25 23   BUN 26* 30*   CREATININE 0.81 0.80   GLUC 108 125       Imaging Results Review: I personally reviewed the following image studies in PACS and associated radiology reports: chest xray, CT head, CT A neck and head, and MRI brain. My interpretation of the radiology images/reports is:  .  Other Study Results Review: EKG was reviewed.     Currently Ordered Meds:   Current Facility-Administered Medications:     acetaminophen (TYLENOL) tablet 650 mg, Q6H KRISS    amLODIPine (NORVASC) tablet 5 mg, Daily    atorvastatin (LIPITOR) tablet 20 mg, Daily With Dinner    [COMPLETED] dexamethasone (DECADRON) tablet 4 mg, Q6H  KRISS **FOLLOWED BY** dexamethasone (DECADRON) tablet 4 mg, Q12H KRISS **FOLLOWED BY** [START ON 11/3/2024] dexamethasone (DECADRON) tablet 2 mg, Q12H KRISS **FOLLOWED BY** [START ON 11/8/2024] dexamethasone (DECADRON) tablet 2 mg, Daily    enoxaparin (LOVENOX) subcutaneous injection 30 mg, Q24H KRISS    gabapentin (NEURONTIN) capsule 300 mg, HS    levETIRAcetam (KEPPRA) tablet 1,000 mg, Q12H KRISS    levothyroxine tablet 50 mcg, Early Morning    lidocaine (LIDODERM) 5 % patch 1 patch, Daily    melatonin tablet 6 mg, HS PRN    ondansetron (ZOFRAN) injection 4 mg, Q4H PRN    pantoprazole (PROTONIX) EC tablet 40 mg, Early Morning    senna-docusate sodium (SENOKOT S) 8.6-50 mg per tablet 2 tablet, BID    Facility-Administered Medications Ordered in Other Encounters:     fentaNYL injection, PRN    midazolam (VERSED) injection, PRN  VTE Pharmacologic Prophylaxis: Sequential compression device (Venodyne)  and Enoxaparin (Lovenox)  VTE Mechanical Prophylaxis: sequential compression device    Administrative Statements   I have spent a total time of 45 minutes in caring for this patient on the day of the visit/encounter including Diagnostic results, Prognosis, Patient and family education, Importance of tx compliance, Counseling / Coordination of care, Documenting in the medical record, Reviewing / ordering tests, medicine, procedures  , and Obtaining or reviewing history  .  Portions of the record may have been created with voice recognition software.

## 2024-10-29 NOTE — ASSESSMENT & PLAN NOTE
Palliative care following  Given GFR less than 60, as needed Toradol was discontinued today    Patient states that she would like to increase the dose of Tylenol if allowed, Also states that gabapentin reduces her pain to 2/10 and she is able to sleep better.     Plan:  - Palliative following and will return on 10/30   - Continue Tylenol 650 mg every 6 hours  - Continue gabapentin 300 mg at bedtime  - Patient would like to avoid opioids if possible

## 2024-10-29 NOTE — ASSESSMENT & PLAN NOTE
Patient with history of non-small cell lung adenocarcinoma diagnosed in March 2024.  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  She completed SRS for these metastatic lesions in August 2024.  She is on Decadron in the outpatient setting and has had multiple admissions due to episodes of altered mental status with increased vasogenic edema after attempting to taper Decadron dosing.  She presented to [facility] on 10/20 with worsening confusion and a stroke alert was called.  Imaging showed no acute findings.  She was loaded with Keppra, started on Decadron, and transferred to [place] for video EEG and MRI brain under neurocritical care for close monitoring.  The morning of 10/21 patient's mental status reportedly much improved and she was deemed stable for transfer out of the ICU.    Symptoms likely in the setting of Decadron taper    Of note, patient is not exactly sure what her current Decadron dosing is.  With that said, it appears that she had been on a Decadron taper throughout September starting at 8 mg daily that was reduced each week.  Her most recent orders show Decadron 1 mg daily from 10/17-10/24 and then 0.5 mg daily for 8 more days    vEEG update as of today: 26 hours of recording. No seizures. Continues to have left hemisphere slowing, left posterior quadrant maximal. There are also left posterior temporal / parietal poorly formed (blunt and at times sharply contoured) LPDs occurring at 0.5-1 Hz. LPDs indicate high seizure risk and are often associated with acute/subacute destructive neuronal injury. Right hemisphere is essentially normal.     Blood cultures no growth at 72 hours    Plan:  - MRI 10/21 show stable vasogenic edema in the left parietotemporal region; previously demonstrated left posterior parietal lesion as well visualized likely due to differences in technique.  No new metastasis, no new acute infarct or hemorrhage.  -  Neurosurgery, neurology, and palliative care consulted.  Appreciate assistance  - Per neurosurgery, no emergent neurosurgical intervention warranted  - Per neurology, continue Keppra 100 mg BID , okay to discharge with this dosage.  Also recommended Plavix or aspirin given right monocular inferior temporal vision for impairment at high risk of developing CV A. Follow-up with epilepsy clinic in 4 to 6 weeks  - Monitor on video EEG  - Reached out to Dr. Gamboa, Rad-Onc on 10/25 and recommended slower taper:  Decadron 4mg PO twice daily for 5 days from 10/29/24 to 11/2/24  Decadron 2mg PO twice daily for 5 days from 11/3/24 to 11/7/24  Decadron 2mg PO daily until her next MRI on 12/27 (she can try to self taper to 1mg during this interval if she wishes)   - PPI for gastric prophylaxis in setting of prolonged Decadron use; patient taking Decadron chronically prior to admission.  - Continue monitoring glucose while on dexamethasone therapy  - SBP goal <160  - Stat CT head with any neurological decline for return of altered mental status  - Seizure precautions in place  - Per Neuropsych, patient has the capacity to make informed medical decisions  - Per CM, daughter would like patient to go to STR until more permanent arrangements can be made. And patient agrees.  Pending STR

## 2024-10-29 NOTE — CONSULTS
Consultation - Neuropsychology/Psychology Department  Ayla Nye 74 y.o. female MRN: [id number]  Unit/Bed#: [facility] 726-01 Encounter: [id number]        Reason for Consultation:  Ayla Nye is a 74 y.o. year old female who was referred for a Neuropsychological Exam to assess cognitive functioning and comment on capacity to make informed medical decisions.      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    Age-related osteoporosis without current pathological fracture    Malignant neoplasm of upper lobe, right bronchus or lung (HCC)    Bilateral leg pain    Insomnia    Moderate protein-calorie malnutrition (HCC)    Dehydration    Visual disturbance    Metastatic cancer to brain (HCC)    Brain mass    Malignant neoplasm metastatic to brain (HCC)    Acute metabolic encephalopathy    Altered mental status    Hypothyroidism    Abnormal imaging of central nervous system    Right hip pain    Goals of care, counseling/discussion    Cancer associated pain    Palliative care patient    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 2023    Due to have durgery June 2024    Essential thrombocytosis (HCC)     controlled with medication    Hyperlipidemia     Hypertension     Mass in chest     Nodular goiter     Osteoporosis     Pneumonia Oct 16, 2023    Also  Feb 2024    Psoriasis     SIRS (systemic inflammatory  response syndrome) (HCC) 2024     Past Surgical History:   Procedure Laterality Date    APPENDECTOMY      BREAST CYST EXCISION Right     COLONOSCOPY  10/31/2018    DXA PROCEDURE (HISTORICAL)  2017    IR BIOPSY OTHER  2024    IR LUMBAR PUNCTURE  2024    MAMMO (HISTORICAL)      8    MAMMO NEEDLE LOCALIZATION RIGHT (ALL INC) Right 2009    SKIN LESION EXCISION      bridge of nose     Social History   Social History     Substance and Sexual Activity   Alcohol Use Not Currently     Social History     Substance and Sexual Activity   Drug Use Never     Social History     Tobacco Use   Smoking Status Former    Current packs/day: 1.00    Average packs/day: 1 pack/day for 58.8 years (58.8 ttl pk-yrs)    Types: Cigarettes    Start date:     Passive exposure: Past   Smokeless Tobacco Never   Tobacco Comments    Quit      Family History:   Family History   Problem Relation Age of Onset    Stroke Mother     Depression Mother             Hypertension Mother     Hearing loss Mother     Tuberculosis Father     Cancer Brother         lung    Tuberculosis Brother     Coronary artery disease Brother     Hypertension Brother     No Known Problems Daughter     No Known Problems Daughter     No Known Problems Maternal Grandmother     No Known Problems Maternal Grandfather     No Known Problems Paternal Grandmother     No Known Problems Paternal Grandfather     No Known Problems Maternal Aunt     No Known Problems Maternal Aunt     No Known Problems Maternal Aunt     No Known Problems Maternal Aunt     No Known Problems Paternal Aunt     Cancer Brother         Throat cancer       Meds/Allergies   current meds:   Current Facility-Administered Medications:     acetaminophen (TYLENOL) tablet 650 mg, Q6H KRISS    amLODIPine (NORVASC) tablet 5 mg, Daily    atorvastatin (LIPITOR) tablet 20 mg, Daily With Dinner    [COMPLETED] dexamethasone (DECADRON) tablet 4 mg, Q6H KRISS **FOLLOWED BY**  dexamethasone (DECADRON) tablet 4 mg, Q12H KRISS **FOLLOWED BY** [START ON 11/3/2024] dexamethasone (DECADRON) tablet 2 mg, Q12H KRISS **FOLLOWED BY** [START ON 11/8/2024] dexamethasone (DECADRON) tablet 2 mg, Daily    enoxaparin (LOVENOX) subcutaneous injection 30 mg, Q24H KRISS    gabapentin (NEURONTIN) capsule 300 mg, HS    levETIRAcetam (KEPPRA) tablet 1,000 mg, Q12H KRISS    levothyroxine tablet 50 mcg, Early Morning    lidocaine (LIDODERM) 5 % patch 1 patch, Daily    melatonin tablet 6 mg, HS PRN    ondansetron (ZOFRAN) injection 4 mg, Q4H PRN    pantoprazole (PROTONIX) EC tablet 40 mg, Early Morning    senna-docusate sodium (SENOKOT S) 8.6-50 mg per tablet 2 tablet, BID    Facility-Administered Medications Ordered in Other Encounters:     fentaNYL injection, PRN    midazolam (VERSED) injection, PRN    Allergies   Allergen Reactions    Doxycycline Headache    Keflex [Cephalexin] Rash    Neomycin-Polymyxin-Dexameth Eye Swelling         Family and Social Support:   Discharge planning discussed with:: Patient      Behavioral Observations: Patient was alert, oriented x 3, cooperative; with pleasant affect; denied depressed mood and anxiety; no overt evidence of confused thought process; patient appeared aware of reason for hospitalization and medical  history    Cognitive Examination    General Cognitive Functioning MMSE = Low [id number];     Attention/Concentration Auditory Selective Attention = Average; Auditory Vigilance = Within Normal Limits; Information Processing Speed = Within Normal Limits    Frontal Systems/Executive Functioning Mental Flexibility/Cognitive Control = Within Normal Limits; Working Memory = Within Normal Limits Abstract Reasoning = Within Normal Limits;  Generative Ability = Within Normal Limits,     Language Functioning Confrontation naming = Within Normal Limits, Phonemic Fluency = Within Normal Limits; Semantic Retrieval = Within Normal Limits; Comprehension of Complex Ideational Material =  Within Normal Limits;  Praxis = Within Normal Limits; Repetition = Within Normal Limits; Basic Reading = Within Normal Limits; Following Commands = Within Normal Limits    Memory Functioning Narrative Recall - Short Delay = Average; Long Delay Narrative Recall = Average;     Visuo-Spatial Abilities Not Assessed    Functional Knowledge  Health & Safety Knowledge = Within Normal Limits;     Summary/Impression:  Results of Neuropsychological Exam revealed adequate cognitive functioning and on a measure assessing awareness of personal health status and ability to evaluate health problems, handle medical emergencies and take safety precautions, patient performed within normal limits. During this encounter, patient appears to have capacity to make informed medical decisions. Cognitive functioning should continue to be closely followed. If there is a decline in functioning, consider repeat capacity exam

## 2024-10-30 PROCEDURE — NC001 PR NO CHARGE: Performed by: INTERNAL MEDICINE

## 2024-10-30 PROCEDURE — 99232 SBSQ HOSP IP/OBS MODERATE 35: CPT | Performed by: INTERNAL MEDICINE

## 2024-10-30 RX ORDER — GABAPENTIN 100 MG/1
100 CAPSULE ORAL
Status: DISCONTINUED | OUTPATIENT
Start: 2024-10-30 | End: 2024-11-04 | Stop reason: HOSPADM

## 2024-10-30 RX ORDER — DIPHENHYDRAMINE HYDROCHLORIDE AND LIDOCAINE HYDROCHLORIDE AND ALUMINUM HYDROXIDE AND MAGNESIUM HYDRO
10 KIT EVERY 4 HOURS PRN
Status: DISCONTINUED | OUTPATIENT
Start: 2024-10-30 | End: 2024-11-04 | Stop reason: HOSPADM

## 2024-10-30 RX ADMIN — ACETAMINOPHEN 650 MG: 325 TABLET, FILM COATED ORAL at 18:11

## 2024-10-30 RX ADMIN — Medication 6 MG: at 23:41

## 2024-10-30 RX ADMIN — PANTOPRAZOLE SODIUM 40 MG: 40 TABLET, DELAYED RELEASE ORAL at 05:41

## 2024-10-30 RX ADMIN — ATORVASTATIN CALCIUM 20 MG: 20 TABLET, FILM COATED ORAL at 18:11

## 2024-10-30 RX ADMIN — GABAPENTIN 300 MG: 300 CAPSULE ORAL at 21:59

## 2024-10-30 RX ADMIN — SENNOSIDES AND DOCUSATE SODIUM 2 TABLET: 50; 8.6 TABLET ORAL at 18:11

## 2024-10-30 RX ADMIN — ACETAMINOPHEN 650 MG: 325 TABLET, FILM COATED ORAL at 11:21

## 2024-10-30 RX ADMIN — ACETAMINOPHEN 650 MG: 325 TABLET, FILM COATED ORAL at 05:41

## 2024-10-30 RX ADMIN — ACETAMINOPHEN 650 MG: 325 TABLET, FILM COATED ORAL at 00:05

## 2024-10-30 RX ADMIN — ACETAMINOPHEN 650 MG: 325 TABLET, FILM COATED ORAL at 23:41

## 2024-10-30 RX ADMIN — DEXAMETHASONE 4 MG: 4 TABLET ORAL at 05:41

## 2024-10-30 RX ADMIN — LEVETIRACETAM 1000 MG: 500 TABLET, FILM COATED ORAL at 08:01

## 2024-10-30 RX ADMIN — ENOXAPARIN SODIUM 30 MG: 30 INJECTION SUBCUTANEOUS at 08:01

## 2024-10-30 RX ADMIN — GABAPENTIN 100 MG: 100 CAPSULE ORAL at 12:47

## 2024-10-30 RX ADMIN — LEVOTHYROXINE SODIUM 50 MCG: 50 TABLET ORAL at 05:41

## 2024-10-30 RX ADMIN — DEXAMETHASONE 4 MG: 4 TABLET ORAL at 18:11

## 2024-10-30 RX ADMIN — Medication 6 MG: at 00:05

## 2024-10-30 RX ADMIN — LEVETIRACETAM 1000 MG: 500 TABLET, FILM COATED ORAL at 21:59

## 2024-10-30 NOTE — CASE MANAGEMENT
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     Palliative care patient 2024     Cancer associated pain 2024     Goals of care, counseling/discussion 2024     Right hip pain 09/10/2024     Altered mental status 2024     Hypothyroidism 2024     Abnormal imaging of central nervous system 2024     Acute metabolic encephalopathy 2024     Malignant neoplasm metastatic to brain (HCC) 2024     Brain mass 2024     Metastatic cancer to brain (HCC) 2024     Visual disturbance 2024     Dehydration 2024     Moderate protein-calorie malnutrition (HCC) 2024     Bilateral leg pain 2024     Insomnia 2024     Malignant neoplasm of upper lobe, right bronchus or lung (HCC) 2024     Age-related osteoporosis without current pathological fracture 2023     Encounter for monitoring of hydroxyurea therapy 2023     JAK2 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 2023     Hypertension 08/10/2022     Mixed hyperlipidemia 08/10/2022     Essential thrombocytosis (HCC) 2020     TB lung, 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     Psoriatic arthritis (HCC) 09/10/2019       LOS (days): 9  Geometric Mean LOS (GMLOS) (days): 4.9  Days to GMLOS:-4.4     OBJECTIVE:  Risk of Unplanned Readmission Score: 35.32         Current admission status: Inpatient   Preferred Pharmacy:   [id number]  [place] - [address] [place] [address]  Phone: [phone] Fax: [phone]    Primary Care  Provider: Rafy Angelo MD    Primary Insurance: MEDICARE  Secondary Insurance: AARP    DISCHARGE DETAILS:    Discharge planning discussed with:: Daughter Juliana        Other Referral/Resources/Interventions Provided:  Referral Comments: CM spoke to Juliana regarding status of referrals.  None of the SNFs that have responded have availabilty at this time. Expanded referrals in [facility].

## 2024-10-30 NOTE — ASSESSMENT & PLAN NOTE
Patient with history of non-small cell lung adenocarcinoma diagnosed in March 2024.  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  She completed SRS for these metastatic lesions in August 2024.  She is on Decadron in the outpatient setting and has had multiple admissions due to episodes of altered mental status with increased vasogenic edema after attempting to taper Decadron dosing.  She presented to [facility] on 10/20 with worsening confusion and a stroke alert was called.  Imaging showed no acute findings.  She was loaded with Keppra, started on Decadron, and transferred to [place] for video EEG and MRI brain under neurocritical care for close monitoring.  The morning of 10/21 patient's mental status reportedly much improved and she was deemed stable for transfer out of the ICU.    Symptoms likely in the setting of Decadron taper    Of note, patient is not exactly sure what her current Decadron dosing is.  With that said, it appears that she had been on a Decadron taper throughout September starting at 8 mg daily that was reduced each week.  Her most recent orders show Decadron 1 mg daily from 10/17-10/24 and then 0.5 mg daily for 8 more days    vEEG update as of today: 26 hours of recording. No seizures. Continues to have left hemisphere slowing, left posterior quadrant maximal. There are also left posterior temporal / parietal poorly formed (blunt and at times sharply contoured) LPDs occurring at 0.5-1 Hz. LPDs indicate high seizure risk and are often associated with acute/subacute destructive neuronal injury. Right hemisphere is essentially normal.     Blood cultures no growth after 5 days    Plan:  - MRI 10/21 show stable vasogenic edema in the left parietotemporal region; previously demonstrated left posterior parietal lesion as well visualized likely due to differences in technique.  No new metastasis, no new acute infarct or hemorrhage.  -  Neurosurgery, neurology, and palliative care consulted.  Appreciate assistance  - Per neurosurgery, no emergent neurosurgical intervention warranted  - Per neurology, continue Keppra 100 mg BID , okay to discharge with this dosage.  Also recommended Plavix or aspirin given right monocular inferior temporal vision for impairment at high risk of developing CV A. Follow-up with epilepsy clinic in 4 to 6 weeks  - Monitor on video EEG  - Reached out to Dr. Gamboa, Rad-Onc on 10/25 and recommended slower taper:  Decadron 4mg PO twice daily for 5 days from 10/29/24 to 11/2/24  Decadron 2mg PO twice daily for 5 days from 11/3/24 to 11/7/24  Decadron 2mg PO daily until her next MRI on 12/27 (she can try to self taper to 1mg during this interval if she wishes)   - PPI for gastric prophylaxis in setting of prolonged Decadron use; patient taking Decadron chronically prior to admission.  - Continue monitoring glucose while on dexamethasone therapy  - SBP goal <160  - Stat CT head with any neurological decline for return of altered mental status  - Seizure precautions in place  - Per Neuropsych, patient has the capacity to make informed medical decisions  - Pending for STR

## 2024-10-30 NOTE — ASSESSMENT & PLAN NOTE
WBC 11.75 from 10.0 10/26. WBC has been increasing since admission of 3.0. Patient is on dexamethasone. Remains afebrile no URI and urinary symptoms. Afebrile, VSS. Low suspicions for infection at this time. Currently on steroid taper.    Plan:  - Continue to monitor for signs/symptoms of infection

## 2024-10-30 NOTE — PROGRESS NOTES
Progress Note - Palliative Care   Name: Ayla Nye 74 y.o. female I MRN: [id number]  Unit/Bed#: [facility] 726-01 I Date of Admission: 10/21/2024   Date of Service: 10/30/2024 I Hospital Day: 9     Assessment & Plan  Metastatic cancer to brain (HCC)  Continue full-cares, including cancer-directed therapy:  Discussed case with Radiation Oncology. Recommend Dexamethasone taper to a baseline (e.g. Minimum-dose) of 2 mg Daily on hospital discharge.  Recommend outpatient follow-up with Medical Oncology, and Radiation Oncology, as scheduled.  Note: Patient has scheduled follow-up with Palliative Care on October 31st, 2024 (Home Visit).  Hypertension    Mixed hyperlipidemia    Malignant neoplasm of upper lobe, right bronchus or lung (HCC)    Hypothyroidism    Palliative care patient  Palliative Diagnosis: Recurrent metastatic non-small cell lung cancer with intracranial metastasis.    Goals:   Continue full-cares, including cancer-directed therapy.  Palliative Care will continue to follow for symptom-management.    Social Support:  Supportive listening provided.  Normalized experience of patient.  Provided anxiety containment.  Advocated for patient/family with interdisciplinary team.  Mediated conflict.    Care Coordination:  Case discussed with patient, and Radiation Oncology.    Follow-Up:  Palliative Care to continue to follow for symptom-management.  Please do not hesitate to contact our on-call provider through [facility] Secure Chat or contact [phone] should there be an acute change or other symptom control concerns.    Acute encephalopathy  Resolving.  Cancer associated pain  Continue current pain-regimen:  Continue Acetaminophen 650 mg q6HR (Scheduled).  Continue Gabapentin 300 mg Bedtime, and add Gabapentin 100 mg Morning and Afternoon.  Failed-therapies:  Patient requesting avoidance of Opiates; however, is now open to re-trial of Gabapentin.    Subjective:  Interval History: Patient was seen and examined on the  General Medical floor. Resting comfortably in bed, on my initial arrival. No acute events noted, overnight. Patient expresses significant frustrations with continued hospitalization, and desire for discharge to home. She qualifies that her daughters are urging her to discharge to a rehabilitation facility prior to returning to home. She is awaiting placement, accordingly. With respect to pain-control, patient reports marked improvement in sleep quality with initiation of Gabapentin at bedtime; however, expresses uncontrolled pain during the daytime. Discussed trial of Gabapentin in the morning and afternoon; and patient was agreeable. She had no further questions or concerns at the time of my encounter.    Review of Systems:  All systems reviewed and negative except otherwise listed in the History of Present Illness.    Objective:  Vitals:    10/30/24 0736   BP: 102/67   Pulse: 67   Resp: 16   Temp: 97.7 °F (36.5 °C)   SpO2: 98%     Physical Exam:   General: Alert, and oriented; Pleasant, and conversational; Overall, Well-Appearing  HEENT: Atraumatic, and normocephalic; PERRLA; EOMI; Moist mucosal membranes  Respiratory: Normal work of breathing; No supplemental-oxygen requirement  Extremities: No obvious deformities; No peripheral edema; Moves all  Neurologic: Appropriately alert, and oriented to Person, Place, and Time    WBC   Date Value Ref Range Status   10/28/2024 11.75 (H) 4.31 - 10.16 Thousand/uL Final   10/27/2024 11.50 (H) 4.31 - 10.16 Thousand/uL Final   10/26/2024 10.05 4.31 - 10.16 Thousand/uL Final     Hemoglobin   Date Value Ref Range Status   10/28/2024 9.4 (L) 11.5 - 15.4 g/dL Final   10/27/2024 9.6 (L) 11.5 - 15.4 g/dL Final   10/26/2024 9.6 (L) 11.5 - 15.4 g/dL Final     Platelets   Date Value Ref Range Status   10/28/2024 280 149 - 390 Thousands/uL Final   10/27/2024 321 149 - 390 Thousands/uL Final   10/26/2024 330 149 - 390 Thousands/uL Final     MCV   Date Value Ref Range Status   10/28/2024  101 (H) 82 - 98 fL Final   10/27/2024 102 (H) 82 - 98 fL Final   10/26/2024 100 (H) 82 - 98 fL Final      Potassium   Date Value Ref Range Status   10/28/2024 4.2 3.5 - 5.3 mmol/L Final   10/27/2024 4.6 3.5 - 5.3 mmol/L Final   10/26/2024 4.1 3.5 - 5.3 mmol/L Final     Chloride   Date Value Ref Range Status   10/28/2024 106 96 - 108 mmol/L Final   10/27/2024 106 96 - 108 mmol/L Final   10/26/2024 107 96 - 108 mmol/L Final     CO2   Date Value Ref Range Status   10/28/2024 23 21 - 32 mmol/L Final   10/27/2024 25 21 - 32 mmol/L Final   10/26/2024 25 21 - 32 mmol/L Final     BUN   Date Value Ref Range Status   10/28/2024 30 (H) 5 - 25 mg/dL Final   10/27/2024 26 (H) 5 - 25 mg/dL Final   10/26/2024 25 5 - 25 mg/dL Final     Creatinine   Date Value Ref Range Status   10/28/2024 0.80 0.60 - 1.30 mg/dL Final     Comment:     Standardized to IDMS reference method   10/27/2024 0.81 0.60 - 1.30 mg/dL Final     Comment:     Standardized to IDMS reference method   10/26/2024 0.80 0.60 - 1.30 mg/dL Final     Comment:     Standardized to IDMS reference method     Glucose   Date Value Ref Range Status   10/28/2024 125 65 - 140 mg/dL Final     Comment:     If the patient is fasting, the ADA then defines impaired fasting glucose as > 100 mg/dL and diabetes as > or equal to 123 mg/dL.   10/27/2024 108 65 - 140 mg/dL Final     Comment:     If the patient is fasting, the ADA then defines impaired fasting glucose as > 100 mg/dL and diabetes as > or equal to 123 mg/dL.   10/26/2024 104 65 - 140 mg/dL Final     Comment:     If the patient is fasting, the ADA then defines impaired fasting glucose as > 100 mg/dL and diabetes as > or equal to 123 mg/dL.     Calcium   Date Value Ref Range Status   10/28/2024 8.5 8.4 - 10.2 mg/dL Final   10/27/2024 8.7 8.4 - 10.2 mg/dL Final   10/26/2024 8.7 8.4 - 10.2 mg/dL Final     Albumin   Date Value Ref Range Status   10/21/2024 3.9 3.5 - 5.0 g/dL Final   10/20/2024 3.8 3.5 - 5.0 g/dL Final   10/03/2024  3.7 3.5 - 5.0 g/dL Final     Total Bilirubin   Date Value Ref Range Status   10/21/2024 0.27 0.20 - 1.00 mg/dL Final     Comment:     Use of this assay is not recommended for patients undergoing treatment with eltrombopag due to the potential for falsely elevated results.  N-acetyl-p-benzoquinone imine (metabolite of Acetaminophen) will generate erroneously low results in samples for patients that have taken an overdose of Acetaminophen.   10/20/2024 0.25 0.20 - 1.00 mg/dL Final     Comment:     Use of this assay is not recommended for patients undergoing treatment with eltrombopag due to the potential for falsely elevated results.  N-acetyl-p-benzoquinone imine (metabolite of Acetaminophen) will generate erroneously low results in samples for patients that have taken an overdose of Acetaminophen.   10/03/2024 0.30 0.20 - 1.00 mg/dL Final     Comment:     Use of this assay is not recommended for patients undergoing treatment with eltrombopag due to the potential for falsely elevated results.  N-acetyl-p-benzoquinone imine (metabolite of Acetaminophen) will generate erroneously low results in samples for patients that have taken an overdose of Acetaminophen.     Alkaline Phosphatase   Date Value Ref Range Status   10/21/2024 47 34 - 104 U/L Final   10/20/2024 45 34 - 104 U/L Final   10/03/2024 34 34 - 104 U/L Final     AST   Date Value Ref Range Status   10/21/2024 12 (L) 13 - 39 U/L Final   10/20/2024 12 (L) 13 - 39 U/L Final   10/03/2024 12 (L) 13 - 39 U/L Final     ALT   Date Value Ref Range Status   10/21/2024 12 7 - 52 U/L Final     Comment:     Specimen collection should occur prior to Sulfasalazine administration due to the potential for falsely depressed results.    10/20/2024 12 7 - 52 U/L Final     Comment:     Specimen collection should occur prior to Sulfasalazine administration due to the potential for falsely depressed results.    10/03/2024 12 7 - 52 U/L Final     Comment:     Specimen collection  "should occur prior to Sulfasalazine administration due to the potential for falsely depressed results.       LD   Date Value Ref Range Status   10/03/2024 461 (H) 140 - 271 U/L Final   09/13/2024 256 140 - 271 U/L Final   08/24/2024 207 140 - 271 U/L Final    No results found for: \"TSH\" No results found for: \"H0SREVN\"   Free T4   Date Value Ref Range Status   09/13/2024 0.92 0.61 - 1.12 ng/dL Final     Comment:     Specimens with biotin concentrations > 10 ng/mL can lead to significant (> 10%) positive bias in result.   08/24/2024 0.77 0.61 - 1.12 ng/dL Final     Comment:     Specimens with biotin concentrations > 10 ng/mL can lead to significant (> 10%) positive bias in result.      Patient was seen and discussed with attending physician, Lola Mujica M.D.    Salome Underwood D.O.  Hematology-Oncology Fellow (PGY-5)     "

## 2024-10-30 NOTE — ASSESSMENT & PLAN NOTE
Continue current pain-regimen:  Continue Acetaminophen 650 mg q6HR (Scheduled).  Continue Gabapentin 300 mg Bedtime, and add Gabapentin 100 mg Morning and Afternoon.  Failed-therapies:  Patient requesting avoidance of Opiates; however, is now open to re-trial of Gabapentin.    Subjective:  Interval History: Patient was seen and examined on the General Medical floor. Resting comfortably in bed, on my initial arrival. No acute events noted, overnight. Patient expresses significant frustrations with continued hospitalization, and desire for discharge to home. She qualifies that her daughters are urging her to discharge to a rehabilitation facility prior to returning to home. She is awaiting placement, accordingly. With respect to pain-control, patient reports marked improvement in sleep quality with initiation of Gabapentin at bedtime; however, expresses uncontrolled pain during the daytime. Discussed trial of Gabapentin in the morning and afternoon; and patient was agreeable. She had no further questions or concerns at the time of my encounter.    Review of Systems:  All systems reviewed and negative except otherwise listed in the History of Present Illness.    Objective:  Vitals:    10/30/24 0736   BP: 102/67   Pulse: 67   Resp: 16   Temp: 97.7 °F (36.5 °C)   SpO2: 98%     Physical Exam:   General: Alert, and oriented; Pleasant, and conversational; Overall, Well-Appearing  HEENT: Atraumatic, and normocephalic; PERRLA; EOMI; Moist mucosal membranes  Respiratory: Normal work of breathing; No supplemental-oxygen requirement  Extremities: No obvious deformities; No peripheral edema; Moves all  Neurologic: Appropriately alert, and oriented to Person, Place, and Time    WBC   Date Value Ref Range Status   10/28/2024 11.75 (H) 4.31 - 10.16 Thousand/uL Final   10/27/2024 11.50 (H) 4.31 - 10.16 Thousand/uL Final   10/26/2024 10.05 4.31 - 10.16 Thousand/uL Final     Hemoglobin   Date Value Ref Range Status   10/28/2024 9.4 (L)  11.5 - 15.4 g/dL Final   10/27/2024 9.6 (L) 11.5 - 15.4 g/dL Final   10/26/2024 9.6 (L) 11.5 - 15.4 g/dL Final     Platelets   Date Value Ref Range Status   10/28/2024 280 149 - 390 Thousands/uL Final   10/27/2024 321 149 - 390 Thousands/uL Final   10/26/2024 330 149 - 390 Thousands/uL Final     MCV   Date Value Ref Range Status   10/28/2024 101 (H) 82 - 98 fL Final   10/27/2024 102 (H) 82 - 98 fL Final   10/26/2024 100 (H) 82 - 98 fL Final      Potassium   Date Value Ref Range Status   10/28/2024 4.2 3.5 - 5.3 mmol/L Final   10/27/2024 4.6 3.5 - 5.3 mmol/L Final   10/26/2024 4.1 3.5 - 5.3 mmol/L Final     Chloride   Date Value Ref Range Status   10/28/2024 106 96 - 108 mmol/L Final   10/27/2024 106 96 - 108 mmol/L Final   10/26/2024 107 96 - 108 mmol/L Final     CO2   Date Value Ref Range Status   10/28/2024 23 21 - 32 mmol/L Final   10/27/2024 25 21 - 32 mmol/L Final   10/26/2024 25 21 - 32 mmol/L Final     BUN   Date Value Ref Range Status   10/28/2024 30 (H) 5 - 25 mg/dL Final   10/27/2024 26 (H) 5 - 25 mg/dL Final   10/26/2024 25 5 - 25 mg/dL Final     Creatinine   Date Value Ref Range Status   10/28/2024 0.80 0.60 - 1.30 mg/dL Final     Comment:     Standardized to IDMS reference method   10/27/2024 0.81 0.60 - 1.30 mg/dL Final     Comment:     Standardized to IDMS reference method   10/26/2024 0.80 0.60 - 1.30 mg/dL Final     Comment:     Standardized to IDMS reference method     Glucose   Date Value Ref Range Status   10/28/2024 125 65 - 140 mg/dL Final     Comment:     If the patient is fasting, the ADA then defines impaired fasting glucose as > 100 mg/dL and diabetes as > or equal to 123 mg/dL.   10/27/2024 108 65 - 140 mg/dL Final     Comment:     If the patient is fasting, the ADA then defines impaired fasting glucose as > 100 mg/dL and diabetes as > or equal to 123 mg/dL.   10/26/2024 104 65 - 140 mg/dL Final     Comment:     If the patient is fasting, the ADA then defines impaired fasting glucose as >  100 mg/dL and diabetes as > or equal to 123 mg/dL.     Calcium   Date Value Ref Range Status   10/28/2024 8.5 8.4 - 10.2 mg/dL Final   10/27/2024 8.7 8.4 - 10.2 mg/dL Final   10/26/2024 8.7 8.4 - 10.2 mg/dL Final     Albumin   Date Value Ref Range Status   10/21/2024 3.9 3.5 - 5.0 g/dL Final   10/20/2024 3.8 3.5 - 5.0 g/dL Final   10/03/2024 3.7 3.5 - 5.0 g/dL Final     Total Bilirubin   Date Value Ref Range Status   10/21/2024 0.27 0.20 - 1.00 mg/dL Final     Comment:     Use of this assay is not recommended for patients undergoing treatment with eltrombopag due to the potential for falsely elevated results.  N-acetyl-p-benzoquinone imine (metabolite of Acetaminophen) will generate erroneously low results in samples for patients that have taken an overdose of Acetaminophen.   10/20/2024 0.25 0.20 - 1.00 mg/dL Final     Comment:     Use of this assay is not recommended for patients undergoing treatment with eltrombopag due to the potential for falsely elevated results.  N-acetyl-p-benzoquinone imine (metabolite of Acetaminophen) will generate erroneously low results in samples for patients that have taken an overdose of Acetaminophen.   10/03/2024 0.30 0.20 - 1.00 mg/dL Final     Comment:     Use of this assay is not recommended for patients undergoing treatment with eltrombopag due to the potential for falsely elevated results.  N-acetyl-p-benzoquinone imine (metabolite of Acetaminophen) will generate erroneously low results in samples for patients that have taken an overdose of Acetaminophen.     Alkaline Phosphatase   Date Value Ref Range Status   10/21/2024 47 34 - 104 U/L Final   10/20/2024 45 34 - 104 U/L Final   10/03/2024 34 34 - 104 U/L Final     AST   Date Value Ref Range Status   10/21/2024 12 (L) 13 - 39 U/L Final   10/20/2024 12 (L) 13 - 39 U/L Final   10/03/2024 12 (L) 13 - 39 U/L Final     ALT   Date Value Ref Range Status   10/21/2024 12 7 - 52 U/L Final     Comment:     Specimen collection should  "occur prior to Sulfasalazine administration due to the potential for falsely depressed results.    10/20/2024 12 7 - 52 U/L Final     Comment:     Specimen collection should occur prior to Sulfasalazine administration due to the potential for falsely depressed results.    10/03/2024 12 7 - 52 U/L Final     Comment:     Specimen collection should occur prior to Sulfasalazine administration due to the potential for falsely depressed results.       LD   Date Value Ref Range Status   10/03/2024 461 (H) 140 - 271 U/L Final   09/13/2024 256 140 - 271 U/L Final   08/24/2024 207 140 - 271 U/L Final    No results found for: \"TSH\" No results found for: \"D3VZLMZ\"   Free T4   Date Value Ref Range Status   09/13/2024 0.92 0.61 - 1.12 ng/dL Final     Comment:     Specimens with biotin concentrations > 10 ng/mL can lead to significant (> 10%) positive bias in result.   08/24/2024 0.77 0.61 - 1.12 ng/dL Final     Comment:     Specimens with biotin concentrations > 10 ng/mL can lead to significant (> 10%) positive bias in result.      Patient was seen and discussed with attending physician, Lola Mujica M.D.    Salome Underwood D.O.  Hematology-Oncology Fellow (PGY-5)   "

## 2024-10-30 NOTE — PROGRESS NOTES
Progress Note - Internal Medicine   Name: Ayla Nye 74 y.o. female I MRN: [id number]  [facility] Date of Admission: 10/21/2024   Date of Service: 10/30/2024 I Hospital Day: 9    Assessment & Plan  Metastatic cancer to brain (HCC)  Patient with history of non-small cell lung adenocarcinoma diagnosed in March 2024.  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  She completed SRS for these metastatic lesions in August 2024.  She is on Decadron in the outpatient setting and has had multiple admissions due to episodes of altered mental status with increased vasogenic edema after attempting to taper Decadron dosing.  She presented to [facility] on 10/20 with worsening confusion and a stroke alert was called.  Imaging showed no acute findings.  She was loaded with Keppra, started on Decadron, and transferred to [place] for video EEG and MRI brain under neurocritical care for close monitoring.  The morning of 10/21 patient's mental status reportedly much improved and she was deemed stable for transfer out of the ICU.    Symptoms likely in the setting of Decadron taper    Of note, patient is not exactly sure what her current Decadron dosing is.  With that said, it appears that she had been on a Decadron taper throughout September starting at 8 mg daily that was reduced each week.  Her most recent orders show Decadron 1 mg daily from 10/17-10/24 and then 0.5 mg daily for 8 more days    vEEG update as of today: 26 hours of recording. No seizures. Continues to have left hemisphere slowing, left posterior quadrant maximal. There are also left posterior temporal / parietal poorly formed (blunt and at times sharply contoured) LPDs occurring at 0.5-1 Hz. LPDs indicate high seizure risk and are often associated with acute/subacute destructive neuronal injury. Right hemisphere is essentially normal.     Blood cultures no growth after 5 days    Plan:  -  MRI 10/21 show stable vasogenic edema in the left parietotemporal region; previously demonstrated left posterior parietal lesion as well visualized likely due to differences in technique.  No new metastasis, no new acute infarct or hemorrhage.  - Neurosurgery, neurology, and palliative care consulted.  Appreciate assistance  - Per neurosurgery, no emergent neurosurgical intervention warranted  - Per neurology, continue Keppra 100 mg BID , okay to discharge with this dosage.  Also recommended Plavix or aspirin given right monocular inferior temporal vision for impairment at high risk of developing CV A. Follow-up with epilepsy clinic in 4 to 6 weeks  - Monitor on video EEG  - Reached out to Dr. Gamboa, Rad-Onc on 10/25 and recommended slower taper:  Decadron 4mg PO twice daily for 5 days from 10/29/24 to 11/2/24  Decadron 2mg PO twice daily for 5 days from 11/3/24 to 11/7/24  Decadron 2mg PO daily until her next MRI on 12/27 (she can try to self taper to 1mg during this interval if she wishes)   - PPI for gastric prophylaxis in setting of prolonged Decadron use; patient taking Decadron chronically prior to admission.  - Continue monitoring glucose while on dexamethasone therapy  - SBP goal <160  - Stat CT head with any neurological decline for return of altered mental status  - Seizure precautions in place  - Per Neuropsych, patient has the capacity to make informed medical decisions  - Pending for STR  Hypertension  Documented history of hypertension but not on any antihypertensives at home.  Blood pressure is acceptable this admission  Patient and chart review, patient usually taking multipin 5 mg and losartan 50 mg daily before being discontinued in June 2024    BP overnight 120s/70s    Plan:  - Continue amlodipine 5 mg    Mixed hyperlipidemia  Lipid panel 7/20/2024: , TG 76, HDL 60,   ASCVD of 18.4%.  Not on any statin therapy per chart review    Plan:  - Continue Lipitor 20 mg daily  Malignant  neoplasm of upper lobe, right bronchus or lung (HCC)  Patient with a history of non-small cell lung cancer of the right upper lobe with metastasis to the brain diagnosed in March 2024.  She was on weekly carboplatin and paclitaxel with radiation from 5/23/2024 - 7/5/2024.  Further imaging after treatment showed signs concerning of recurrent metastatic NSCLC now with possible metastasis to the pelvis.  For this, it was recommended that she undergo systemic chemoimmunotherapy with carboplatin, pemetrexed, and pembrolizumab.  Her first cycle was on 10/7/2024.    Plan:  - Patient following closely with oncology in the outpatient setting  - Remainder of care for active concerns as mentioned above  - CT chest abdomen and pelvis revealed new small bilateral pulmonary nodules, likely metastasis, new right renal lesion(s) concerning for metastasis, significant interval enlargement of necrotic appearing mass in the right ischiorectal fossa, likely metastasis, deceasing RUL necrotic mass with stable and/or decreasing satellite nodularity, improved mediastinal and right hilar adenopathy, stable 2.4 cm CHUYITA groundglass density.  - Patient will f/u with heme-onc on 11/5/24    Hypothyroidism  Continue PTA levothyroxine 50 mcg daily  Palliative care patient  Palliative care following  Given GFR less than 60, as needed Toradol was discontinued today    Patient states that she would like to increase the dose of Tylenol if allowed, Also states that gabapentin reduces her pain to 2/10 and she is able to sleep better.     Plan:   - Continue Tylenol 650 mg every 6 hours  - Continue gabapentin 300 mg at bedtime  - Patient would like to avoid opioids if possible  Anemia  Hgb 8.9 and  on admission. 9/13/24 folate >22.3, B12 1,268. 9/5/23 MMA wnl. Patient has been having chronic macrocytic anemia since 9/2023.    Plan:  - Continue to monitor hgb daily  Leukocytosis  WBC 11.75 from 10.0 10/26. WBC has been increasing since admission of  3.0. Patient is on dexamethasone. Remains afebrile no URI and urinary symptoms. Afebrile, VSS. Low suspicions for infection at this time. Currently on steroid taper.    Plan:  - Continue to monitor for signs/symptoms of infection      Disposition: Pending STR    Team: SOD TEAM KEL    Subjective   Patient seen and examined. No acute events overnight.  Patient complaints of sore throat with dry cough in the morning with runny nose. I talk to RN that patient can get magic mouth wash for sore throat. Patient states she had a good sleep; Tylenol and gabapentin help with her pain at this point.  No fever, chills, chest pain/discomfort, abdominal pain, nausea/vomiting/diarrhea, and urinary symptoms. Last bowel movement was yesterday. No neurological symptoms.     Objective :  Temp:  [98.8 °F (37.1 °C)-99.4 °F (37.4 °C)] 99.4 °F (37.4 °C)  HR:  [63-71] 63  BP: (110-129)/(66-78) 110/66  Resp:  [16] 16  SpO2:  [95 %-96 %] 96 %    I/O         10/20 0701  10/21 0700 10/21 0701  10/22 0700    I.V. (mL/kg) 146.3 442.5 (8.5)    IV Piggyback  50    Total Intake(mL/kg) 146.3 492.5 (9.4)    Urine (mL/kg/hr) 650 1165 (0.9)    Total Output 650 1165    Net -503.8 -672.5          Unmeasured Urine Occurrence  1 x          Weights:        Body mass index is 21.33 kg/m².  Weight (last 2 days)       Date/Time Weight    10/30/24 0536 52.9 (116.62)    10/29/24 0539 54 (119.05)    10/28/24 0600 54.5 (120.15)            Physical Exam  Vitals and nursing note reviewed.   Constitutional:       General: She is not in acute distress.     Appearance: She is well-developed.   HENT:      Head: Normocephalic and atraumatic.      Mouth/Throat:      Mouth: Mucous membranes are moist.      Pharynx: No oropharyngeal exudate or posterior oropharyngeal erythema.      Comments: No postnasal drip noted  Eyes:      General: No scleral icterus.     Extraocular Movements: Extraocular movements intact.      Conjunctiva/sclera: Conjunctivae normal.   Cardiovascular:       Rate and Rhythm: Normal rate and regular rhythm.      Pulses: Normal pulses.      Heart sounds: Normal heart sounds. No murmur heard.  Pulmonary:      Effort: Pulmonary effort is normal. No respiratory distress.      Comments: Decreased breath sounds in R side more prominent in RUL    Abdominal:      General: Bowel sounds are normal.      Palpations: Abdomen is soft.      Tenderness: There is no abdominal tenderness.   Musculoskeletal:         General: No swelling.      Cervical back: Neck supple.      Right lower leg: No edema.      Left lower leg: No edema.   Lymphadenopathy:      Cervical: No cervical adenopathy.   Skin:     General: Skin is warm and dry.      Capillary Refill: Capillary refill takes less than 2 seconds.      Findings: No bruising or lesion.   Neurological:      General: No focal deficit present.      Mental Status: She is alert and oriented to person, place, and time.      Cranial Nerves: Cranial nerves 2-12 are intact. No cranial nerve deficit.      Motor: No weakness.      Comments: Upper extremities 5/5  Lower extremities 5/5   Psychiatric:         Mood and Affect: Mood normal.           Lab Results: I have reviewed the following results:  Recent Labs     10/28/24  0439   WBC 11.75*   HGB 9.4*   HCT 29.0*      SODIUM 141   K 4.2      CO2 23   BUN 30*   CREATININE 0.80   GLUC 125       Imaging Results Review: I personally reviewed the following image studies in PACS and associated radiology reports: chest xray, CT head, CT A neck and head, and MRI brain. My interpretation of the radiology images/reports is:  .  Other Study Results Review: EKG was reviewed.     Currently Ordered Meds:   Current Facility-Administered Medications:     acetaminophen (TYLENOL) tablet 650 mg, Q6H KRISS    amLODIPine (NORVASC) tablet 5 mg, Daily    atorvastatin (LIPITOR) tablet 20 mg, Daily With Dinner    [COMPLETED] dexamethasone (DECADRON) tablet 4 mg, Q6H KRISS **FOLLOWED BY** dexamethasone  (DECADRON) tablet 4 mg, Q12H KRISS **FOLLOWED BY** [START ON 11/3/2024] dexamethasone (DECADRON) tablet 2 mg, Q12H KRISS **FOLLOWED BY** [START ON 11/8/2024] dexamethasone (DECADRON) tablet 2 mg, Daily    enoxaparin (LOVENOX) subcutaneous injection 30 mg, Q24H KRISS    gabapentin (NEURONTIN) capsule 300 mg, HS    levETIRAcetam (KEPPRA) tablet 1,000 mg, Q12H KRISS    levothyroxine tablet 50 mcg, Early Morning    lidocaine (LIDODERM) 5 % patch 1 patch, Daily    melatonin tablet 6 mg, HS PRN    ondansetron (ZOFRAN) injection 4 mg, Q4H PRN    pantoprazole (PROTONIX) EC tablet 40 mg, Early Morning    senna-docusate sodium (SENOKOT S) 8.6-50 mg per tablet 2 tablet, BID    Facility-Administered Medications Ordered in Other Encounters:     fentaNYL injection, PRN    midazolam (VERSED) injection, PRN  VTE Pharmacologic Prophylaxis: Sequential compression device (Venodyne)  and Enoxaparin (Lovenox)  VTE Mechanical Prophylaxis: sequential compression device    Administrative Statements   I have spent a total time of 45 minutes in caring for this patient on the day of the visit/encounter including Diagnostic results, Prognosis, Patient and family education, Importance of tx compliance, Counseling / Coordination of care, Documenting in the medical record, Reviewing / ordering tests, medicine, procedures  , and Obtaining or reviewing history  .  Portions of the record may have been created with voice recognition software.

## 2024-10-30 NOTE — ASSESSMENT & PLAN NOTE
Palliative care following  Given GFR less than 60, as needed Toradol was discontinued today    Patient states that she would like to increase the dose of Tylenol if allowed, Also states that gabapentin reduces her pain to 2/10 and she is able to sleep better.     Plan:   - Continue Tylenol 650 mg every 6 hours  - Continue gabapentin 300 mg at bedtime  - Patient would like to avoid opioids if possible

## 2024-10-30 NOTE — PROGRESS NOTES
Pastoral Care Progress Note               10/30/24 1500   Clinical Encounter Type   Visited With Patient  (Father Netsor)   Hinduism Encounters   Hinduism Needs Prayer  ([place])

## 2024-10-30 NOTE — ASSESSMENT & PLAN NOTE
Palliative Diagnosis: Recurrent metastatic non-small cell lung cancer with intracranial metastasis.    Goals:   Continue full-cares, including cancer-directed therapy.  Palliative Care will continue to follow for symptom-management.    Social Support:  Supportive listening provided.  Normalized experience of patient.  Provided anxiety containment.  Advocated for patient/family with interdisciplinary team.  Mediated conflict.    Care Coordination:  Case discussed with patient, and Radiation Oncology.    Follow-Up:  Palliative Care to continue to follow for symptom-management.  Please do not hesitate to contact our on-call provider through [employer] Secure Chat or contact [phone] should there be an acute change or other symptom control concerns.

## 2024-10-31 PROCEDURE — 99232 SBSQ HOSP IP/OBS MODERATE 35: CPT | Performed by: INTERNAL MEDICINE

## 2024-10-31 PROCEDURE — 99232 SBSQ HOSP IP/OBS MODERATE 35: CPT | Performed by: STUDENT IN AN ORGANIZED HEALTH CARE EDUCATION/TRAINING PROGRAM

## 2024-10-31 RX ADMIN — PANTOPRAZOLE SODIUM 40 MG: 40 TABLET, DELAYED RELEASE ORAL at 05:54

## 2024-10-31 RX ADMIN — DEXAMETHASONE 4 MG: 4 TABLET ORAL at 05:54

## 2024-10-31 RX ADMIN — ACETAMINOPHEN 650 MG: 325 TABLET, FILM COATED ORAL at 05:55

## 2024-10-31 RX ADMIN — DEXAMETHASONE 4 MG: 4 TABLET ORAL at 17:46

## 2024-10-31 RX ADMIN — LEVETIRACETAM 1000 MG: 500 TABLET, FILM COATED ORAL at 08:28

## 2024-10-31 RX ADMIN — ACETAMINOPHEN 650 MG: 325 TABLET, FILM COATED ORAL at 23:54

## 2024-10-31 RX ADMIN — LEVOTHYROXINE SODIUM 50 MCG: 50 TABLET ORAL at 05:54

## 2024-10-31 RX ADMIN — ACETAMINOPHEN 650 MG: 325 TABLET, FILM COATED ORAL at 17:45

## 2024-10-31 RX ADMIN — LEVETIRACETAM 1000 MG: 500 TABLET, FILM COATED ORAL at 21:58

## 2024-10-31 RX ADMIN — GABAPENTIN 100 MG: 100 CAPSULE ORAL at 11:36

## 2024-10-31 RX ADMIN — ACETAMINOPHEN 650 MG: 325 TABLET, FILM COATED ORAL at 11:36

## 2024-10-31 RX ADMIN — AMLODIPINE BESYLATE 5 MG: 5 TABLET ORAL at 08:29

## 2024-10-31 RX ADMIN — GABAPENTIN 100 MG: 100 CAPSULE ORAL at 05:55

## 2024-10-31 RX ADMIN — GABAPENTIN 300 MG: 300 CAPSULE ORAL at 21:58

## 2024-10-31 RX ADMIN — SENNOSIDES AND DOCUSATE SODIUM 2 TABLET: 50; 8.6 TABLET ORAL at 08:28

## 2024-10-31 RX ADMIN — ATORVASTATIN CALCIUM 20 MG: 20 TABLET, FILM COATED ORAL at 17:45

## 2024-10-31 RX ADMIN — SENNOSIDES AND DOCUSATE SODIUM 2 TABLET: 50; 8.6 TABLET ORAL at 17:46

## 2024-10-31 RX ADMIN — ENOXAPARIN SODIUM 30 MG: 30 INJECTION SUBCUTANEOUS at 08:28

## 2024-10-31 RX ADMIN — Medication 6 MG: at 23:54

## 2024-10-31 NOTE — PROGRESS NOTES
Progress Note - Palliative Care   Name: Ayla Nye 74 y.o. female I MRN: [id number]  Unit/Bed#: [facility] 726-01 I Date of Admission: 10/21/2024   Date of Service: 10/31/2024 I Hospital Day: 10     Assessment & Plan  Metastatic cancer to brain (HCC)  Continue full-cares, including cancer-directed therapy:  Discussed case with Radiation Oncology. Recommended Dexamethasone taper to a baseline (e.g. Minimum-dose) of 2 mg Daily on hospital discharge.  Recommend outpatient follow-up with Medical Oncology, and Radiation Oncology, as scheduled.  Palliative care patient  Palliative Diagnosis: Recurrent metastatic non-small cell lung cancer with intracranial metastasis.    Goals:   Continue full-cares, including cancer-directed therapy.  Palliative Care will continue to follow for symptom-management.    Social Support:  Supportive listening provided.  Normalized experience of patient.  Provided anxiety containment.  Advocated for patient/family with interdisciplinary team.  Mediated conflict.    Care Coordination:  Case discussed with patient, and Radiation Oncology.    Follow-Up:  Palliative Care to continue to follow for symptom-management.  Please do not hesitate to contact our on-call provider through [facility] Secure Chat or contact [phone] should there be an acute change or other symptom control concerns.    Cancer associated pain  Continue current pain-regimen:  Continue Acetaminophen 650 mg q6HR (Scheduled).  Continue Gabapentin 300 mg Bedtime, and added Gabapentin 100 mg Morning and Afternoon.  Failed-therapies:  Patient requesting avoidance of Opiates; however, is now open to re-trial of Gabapentin.    Subjective:  Interval History: Patient was seen and examined on the General Medical floor. Resting comfortably in bed, on my initial arrival. No acute events noted, overnight. Patient expresses marked improvement in pain-control, on initiation of Gabapentin 100 mg Morning and Afternoon, yesterday. She does endorse  mild daytime drowsiness; although states that her cognitive-function, and gait are not largely impacted by the above-mentioned. She is still awaiting placement at a rehabilitation facility. Otherwise, she has no concerns or complaints at the time of my evaluation.    Review of Systems:  All systems reviewed and negative except otherwise listed in the History of Present Illness.    Objective:  Vitals:    10/31/24 0829   BP: 135/76   Pulse: 69   Resp:    Temp:    SpO2: 98%     Physical Exam:   General: Alert, and oriented; Pleasant, and conversational; Overall, Well-Appearing  HEENT: Atraumatic, and normocephalic; PERRLA; EOMI; Moist mucosal membranes  Respiratory: Normal work of breathing; No supplemental-oxygen requirement  Extremities: No obvious deformities; No peripheral edema; Moves all  Neurologic: Appropriately alert, and oriented to Person, Place, and Time    WBC   Date Value Ref Range Status   10/28/2024 11.75 (H) 4.31 - 10.16 Thousand/uL Final   10/27/2024 11.50 (H) 4.31 - 10.16 Thousand/uL Final   10/26/2024 10.05 4.31 - 10.16 Thousand/uL Final     Hemoglobin   Date Value Ref Range Status   10/28/2024 9.4 (L) 11.5 - 15.4 g/dL Final   10/27/2024 9.6 (L) 11.5 - 15.4 g/dL Final   10/26/2024 9.6 (L) 11.5 - 15.4 g/dL Final     Platelets   Date Value Ref Range Status   10/28/2024 280 149 - 390 Thousands/uL Final   10/27/2024 321 149 - 390 Thousands/uL Final   10/26/2024 330 149 - 390 Thousands/uL Final     MCV   Date Value Ref Range Status   10/28/2024 101 (H) 82 - 98 fL Final   10/27/2024 102 (H) 82 - 98 fL Final   10/26/2024 100 (H) 82 - 98 fL Final      Potassium   Date Value Ref Range Status   10/28/2024 4.2 3.5 - 5.3 mmol/L Final   10/27/2024 4.6 3.5 - 5.3 mmol/L Final   10/26/2024 4.1 3.5 - 5.3 mmol/L Final     Chloride   Date Value Ref Range Status   10/28/2024 106 96 - 108 mmol/L Final   10/27/2024 106 96 - 108 mmol/L Final   10/26/2024 107 96 - 108 mmol/L Final     CO2   Date Value Ref Range Status    10/28/2024 23 21 - 32 mmol/L Final   10/27/2024 25 21 - 32 mmol/L Final   10/26/2024 25 21 - 32 mmol/L Final     BUN   Date Value Ref Range Status   10/28/2024 30 (H) 5 - 25 mg/dL Final   10/27/2024 26 (H) 5 - 25 mg/dL Final   10/26/2024 25 5 - 25 mg/dL Final     Creatinine   Date Value Ref Range Status   10/28/2024 0.80 0.60 - 1.30 mg/dL Final     Comment:     Standardized to IDMS reference method   10/27/2024 0.81 0.60 - 1.30 mg/dL Final     Comment:     Standardized to IDMS reference method   10/26/2024 0.80 0.60 - 1.30 mg/dL Final     Comment:     Standardized to IDMS reference method     Glucose   Date Value Ref Range Status   10/28/2024 125 65 - 140 mg/dL Final     Comment:     If the patient is fasting, the ADA then defines impaired fasting glucose as > 100 mg/dL and diabetes as > or equal to 123 mg/dL.   10/27/2024 108 65 - 140 mg/dL Final     Comment:     If the patient is fasting, the ADA then defines impaired fasting glucose as > 100 mg/dL and diabetes as > or equal to 123 mg/dL.   10/26/2024 104 65 - 140 mg/dL Final     Comment:     If the patient is fasting, the ADA then defines impaired fasting glucose as > 100 mg/dL and diabetes as > or equal to 123 mg/dL.     Calcium   Date Value Ref Range Status   10/28/2024 8.5 8.4 - 10.2 mg/dL Final   10/27/2024 8.7 8.4 - 10.2 mg/dL Final   10/26/2024 8.7 8.4 - 10.2 mg/dL Final     Albumin   Date Value Ref Range Status   10/21/2024 3.9 3.5 - 5.0 g/dL Final   10/20/2024 3.8 3.5 - 5.0 g/dL Final   10/03/2024 3.7 3.5 - 5.0 g/dL Final     Total Bilirubin   Date Value Ref Range Status   10/21/2024 0.27 0.20 - 1.00 mg/dL Final     Comment:     Use of this assay is not recommended for patients undergoing treatment with eltrombopag due to the potential for falsely elevated results.  N-acetyl-p-benzoquinone imine (metabolite of Acetaminophen) will generate erroneously low results in samples for patients that have taken an overdose of Acetaminophen.   10/20/2024 0.25 0.20  "- 1.00 mg/dL Final     Comment:     Use of this assay is not recommended for patients undergoing treatment with eltrombopag due to the potential for falsely elevated results.  N-acetyl-p-benzoquinone imine (metabolite of Acetaminophen) will generate erroneously low results in samples for patients that have taken an overdose of Acetaminophen.   10/03/2024 0.30 0.20 - 1.00 mg/dL Final     Comment:     Use of this assay is not recommended for patients undergoing treatment with eltrombopag due to the potential for falsely elevated results.  N-acetyl-p-benzoquinone imine (metabolite of Acetaminophen) will generate erroneously low results in samples for patients that have taken an overdose of Acetaminophen.     Alkaline Phosphatase   Date Value Ref Range Status   10/21/2024 47 34 - 104 U/L Final   10/20/2024 45 34 - 104 U/L Final   10/03/2024 34 34 - 104 U/L Final     AST   Date Value Ref Range Status   10/21/2024 12 (L) 13 - 39 U/L Final   10/20/2024 12 (L) 13 - 39 U/L Final   10/03/2024 12 (L) 13 - 39 U/L Final     ALT   Date Value Ref Range Status   10/21/2024 12 7 - 52 U/L Final     Comment:     Specimen collection should occur prior to Sulfasalazine administration due to the potential for falsely depressed results.    10/20/2024 12 7 - 52 U/L Final     Comment:     Specimen collection should occur prior to Sulfasalazine administration due to the potential for falsely depressed results.    10/03/2024 12 7 - 52 U/L Final     Comment:     Specimen collection should occur prior to Sulfasalazine administration due to the potential for falsely depressed results.       LD   Date Value Ref Range Status   10/03/2024 461 (H) 140 - 271 U/L Final   09/13/2024 256 140 - 271 U/L Final   08/24/2024 207 140 - 271 U/L Final    No results found for: \"TSH\" No results found for: \"V3FEFTW\"   Free T4   Date Value Ref Range Status   09/13/2024 0.92 0.61 - 1.12 ng/dL Final     Comment:     Specimens with biotin concentrations > 10 ng/mL can " lead to significant (> 10%) positive bias in result.   08/24/2024 0.77 0.61 - 1.12 ng/dL Final     Comment:     Specimens with biotin concentrations > 10 ng/mL can lead to significant (> 10%) positive bias in result.      Patient was seen and discussed with attending physician, Nathan Neff M.D.    Salome Underwood D.O.  Hematology-Oncology Fellow (PGY-5)

## 2024-10-31 NOTE — ASSESSMENT & PLAN NOTE
Palliative Diagnosis: Recurrent metastatic non-small cell lung cancer with intracranial metastasis.    Goals:   Continue full-cares, including cancer-directed therapy.  Palliative Care will continue to follow for symptom-management.    Social Support:  Supportive listening provided.  Normalized experience of patient.  Provided anxiety containment.  Advocated for patient/family with interdisciplinary team.  Mediated conflict.    Care Coordination:  Case discussed with patient, and Radiation Oncology.    Follow-Up:  Palliative Care to continue to follow for symptom-management.  Please do not hesitate to contact our on-call provider through [facility] Secure Chat or contact [phone] should there be an acute change or other symptom control concerns.

## 2024-10-31 NOTE — ASSESSMENT & PLAN NOTE
Continue full-cares, including cancer-directed therapy:  Discussed case with Radiation Oncology. Recommended Dexamethasone taper to a baseline (e.g. Minimum-dose) of 2 mg Daily on hospital discharge.  Recommend outpatient follow-up with Medical Oncology, and Radiation Oncology, as scheduled.

## 2024-10-31 NOTE — ASSESSMENT & PLAN NOTE
Problem: Patient Care Overview  Goal: Plan of Care Review  Flowsheets (Taken 7/7/2020 0100)  Progress: improving  Plan of Care Reviewed With: patient  Outcome Summary: POD#1, VSS, AMBULATING WELL WITH 1 ASSIST, VOIDING FINE PER BRP. POSSIBLE D/C IN THE MORNING. EDUCATION  PROVIDED ON BP MONITORING. PATIENT VERBALIZED UNDERSTANDING.      Palliative care following  Given GFR less than 60, as needed Toradol was discontinued today    Patient states that she would like to increase the dose of Tylenol if allowed, Also states that gabapentin reduces her pain to 2/10 and she is able to sleep better.     Plan:   - Continue Tylenol 650 mg every 6 hours  - Continue gabapentin 300 mg at bedtime  - Continue gabapentin 100 mg in the morning and afternoon   - Patient would like to avoid opioids if possible

## 2024-10-31 NOTE — ASSESSMENT & PLAN NOTE
Patient with a history of non-small cell lung cancer of the right upper lobe with metastasis to the brain diagnosed in March 2024.  She was on weekly carboplatin and paclitaxel with radiation from 5/23/2024 - 7/5/2024.  Further imaging after treatment showed signs concerning of recurrent metastatic NSCLC now with possible metastasis to the pelvis.  For this, it was recommended that she undergo systemic chemoimmunotherapy with carboplatin, pemetrexed, and pembrolizumab.  Her first cycle was on 10/7/2024.    Patient has concern about her CA treatment, and I reached out to her oncologist, Dr. Rosalinda Castro, who agreed that  inpatient onc service would not able to provide more other than to reiterate OP follow up.     Plan:  - Patient following closely with oncology in the outpatient setting  - Remainder of care for active concerns as mentioned above  - CT chest abdomen and pelvis revealed new small bilateral pulmonary nodules, likely metastasis, new right renal lesion(s) concerning for metastasis, significant interval enlargement of necrotic appearing mass in the right ischiorectal fossa, likely metastasis, deceasing RUL necrotic mass with stable and/or decreasing satellite nodularity, improved mediastinal and right hilar adenopathy, stable 2.4 cm CHUYITA groundglass density.  - Patient will f/u with heme-onc on 11/5/24

## 2024-10-31 NOTE — ASSESSMENT & PLAN NOTE
Patient with history of non-small cell lung adenocarcinoma diagnosed in March 2024.  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  She completed SRS for these metastatic lesions in August 2024.  She is on Decadron in the outpatient setting and has had multiple admissions due to episodes of altered mental status with increased vasogenic edema after attempting to taper Decadron dosing.  She presented to [facility] on 10/20 with worsening confusion and a stroke alert was called.  Imaging showed no acute findings.  She was loaded with Keppra, started on Decadron, and transferred to [place] for video EEG and MRI brain under neurocritical care for close monitoring.  The morning of 10/21 patient's mental status reportedly much improved and she was deemed stable for transfer out of the ICU.    Symptoms likely in the setting of Decadron taper    Of note, patient is not exactly sure what her current Decadron dosing is.  With that said, it appears that she had been on a Decadron taper throughout September starting at 8 mg daily that was reduced each week.  Her most recent orders show Decadron 1 mg daily from 10/17-10/24 and then 0.5 mg daily for 8 more days    vEEG update as of today: 26 hours of recording. No seizures. Continues to have left hemisphere slowing, left posterior quadrant maximal. There are also left posterior temporal / parietal poorly formed (blunt and at times sharply contoured) LPDs occurring at 0.5-1 Hz. LPDs indicate high seizure risk and are often associated with acute/subacute destructive neuronal injury. Right hemisphere is essentially normal.     Blood cultures no growth after 5 days    Plan:  - MRI 10/21 show stable vasogenic edema in the left parietotemporal region; previously demonstrated left posterior parietal lesion as well visualized likely due to differences in technique.  No new metastasis, no new acute infarct or hemorrhage.  -  Neurosurgery, neurology, and palliative care consulted.  Appreciate assistance  - Per neurosurgery, no emergent neurosurgical intervention warranted  - Per neurology, continue Keppra 100 mg BID , okay to discharge with this dosage.  Also recommended Plavix or aspirin given right monocular inferior temporal vision for impairment at high risk of developing CV A. Follow-up with epilepsy clinic in 4 to 6 weeks  - Monitor on video EEG  - Reached out to Dr. Gamboa, Rad-Onc on 10/25 and recommended slower taper:  Decadron 4mg PO twice daily for 5 days from 10/29/24 to 11/2/24  Decadron 2mg PO twice daily for 5 days from 11/3/24 to 11/7/24  Decadron 2mg PO daily until her next MRI on 12/27 (she can try to self taper to 1mg during this interval if she wishes)   - PPI for gastric prophylaxis in setting of prolonged Decadron use; patient taking Decadron chronically prior to admission.  - Continue monitoring glucose while on dexamethasone therapy  - SBP goal <160  - Stat CT head with any neurological decline for return of altered mental status  - Seizure precautions in place  - Per Neuropsych, patient has the capacity to make informed medical decisions  - Pending for STR

## 2024-10-31 NOTE — ASSESSMENT & PLAN NOTE
Continue current pain-regimen:  Continue Acetaminophen 650 mg q6HR (Scheduled).  Continue Gabapentin 300 mg Bedtime, and added Gabapentin 100 mg Morning and Afternoon.  Failed-therapies:  Patient requesting avoidance of Opiates; however, is now open to re-trial of Gabapentin.    Subjective:  Interval History: Patient was seen and examined on the General Medical floor. Resting comfortably in bed, on my initial arrival. No acute events noted, overnight. Patient expresses marked improvement in pain-control, on initiation of Gabapentin 100 mg Morning and Afternoon, yesterday. She does endorse mild daytime drowsiness; although states that her cognitive-function, and gait are not largely impacted by the above-mentioned. She is still awaiting placement at a rehabilitation facility. Otherwise, she has no concerns or complaints at the time of my evaluation.    Review of Systems:  All systems reviewed and negative except otherwise listed in the History of Present Illness.    Objective:  Vitals:    10/31/24 0829   BP: 135/76   Pulse: 69   Resp:    Temp:    SpO2: 98%     Physical Exam:   General: Alert, and oriented; Pleasant, and conversational; Overall, Well-Appearing  HEENT: Atraumatic, and normocephalic; PERRLA; EOMI; Moist mucosal membranes  Respiratory: Normal work of breathing; No supplemental-oxygen requirement  Extremities: No obvious deformities; No peripheral edema; Moves all  Neurologic: Appropriately alert, and oriented to Person, Place, and Time    WBC   Date Value Ref Range Status   10/28/2024 11.75 (H) 4.31 - 10.16 Thousand/uL Final   10/27/2024 11.50 (H) 4.31 - 10.16 Thousand/uL Final   10/26/2024 10.05 4.31 - 10.16 Thousand/uL Final     Hemoglobin   Date Value Ref Range Status   10/28/2024 9.4 (L) 11.5 - 15.4 g/dL Final   10/27/2024 9.6 (L) 11.5 - 15.4 g/dL Final   10/26/2024 9.6 (L) 11.5 - 15.4 g/dL Final     Platelets   Date Value Ref Range Status   10/28/2024 280 149 - 390 Thousands/uL Final   10/27/2024  321 149 - 390 Thousands/uL Final   10/26/2024 330 149 - 390 Thousands/uL Final     MCV   Date Value Ref Range Status   10/28/2024 101 (H) 82 - 98 fL Final   10/27/2024 102 (H) 82 - 98 fL Final   10/26/2024 100 (H) 82 - 98 fL Final      Potassium   Date Value Ref Range Status   10/28/2024 4.2 3.5 - 5.3 mmol/L Final   10/27/2024 4.6 3.5 - 5.3 mmol/L Final   10/26/2024 4.1 3.5 - 5.3 mmol/L Final     Chloride   Date Value Ref Range Status   10/28/2024 106 96 - 108 mmol/L Final   10/27/2024 106 96 - 108 mmol/L Final   10/26/2024 107 96 - 108 mmol/L Final     CO2   Date Value Ref Range Status   10/28/2024 23 21 - 32 mmol/L Final   10/27/2024 25 21 - 32 mmol/L Final   10/26/2024 25 21 - 32 mmol/L Final     BUN   Date Value Ref Range Status   10/28/2024 30 (H) 5 - 25 mg/dL Final   10/27/2024 26 (H) 5 - 25 mg/dL Final   10/26/2024 25 5 - 25 mg/dL Final     Creatinine   Date Value Ref Range Status   10/28/2024 0.80 0.60 - 1.30 mg/dL Final     Comment:     Standardized to IDMS reference method   10/27/2024 0.81 0.60 - 1.30 mg/dL Final     Comment:     Standardized to IDMS reference method   10/26/2024 0.80 0.60 - 1.30 mg/dL Final     Comment:     Standardized to IDMS reference method     Glucose   Date Value Ref Range Status   10/28/2024 125 65 - 140 mg/dL Final     Comment:     If the patient is fasting, the ADA then defines impaired fasting glucose as > 100 mg/dL and diabetes as > or equal to 123 mg/dL.   10/27/2024 108 65 - 140 mg/dL Final     Comment:     If the patient is fasting, the ADA then defines impaired fasting glucose as > 100 mg/dL and diabetes as > or equal to 123 mg/dL.   10/26/2024 104 65 - 140 mg/dL Final     Comment:     If the patient is fasting, the ADA then defines impaired fasting glucose as > 100 mg/dL and diabetes as > or equal to 123 mg/dL.     Calcium   Date Value Ref Range Status   10/28/2024 8.5 8.4 - 10.2 mg/dL Final   10/27/2024 8.7 8.4 - 10.2 mg/dL Final   10/26/2024 8.7 8.4 - 10.2 mg/dL Final      Albumin   Date Value Ref Range Status   10/21/2024 3.9 3.5 - 5.0 g/dL Final   10/20/2024 3.8 3.5 - 5.0 g/dL Final   10/03/2024 3.7 3.5 - 5.0 g/dL Final     Total Bilirubin   Date Value Ref Range Status   10/21/2024 0.27 0.20 - 1.00 mg/dL Final     Comment:     Use of this assay is not recommended for patients undergoing treatment with eltrombopag due to the potential for falsely elevated results.  N-acetyl-p-benzoquinone imine (metabolite of Acetaminophen) will generate erroneously low results in samples for patients that have taken an overdose of Acetaminophen.   10/20/2024 0.25 0.20 - 1.00 mg/dL Final     Comment:     Use of this assay is not recommended for patients undergoing treatment with eltrombopag due to the potential for falsely elevated results.  N-acetyl-p-benzoquinone imine (metabolite of Acetaminophen) will generate erroneously low results in samples for patients that have taken an overdose of Acetaminophen.   10/03/2024 0.30 0.20 - 1.00 mg/dL Final     Comment:     Use of this assay is not recommended for patients undergoing treatment with eltrombopag due to the potential for falsely elevated results.  N-acetyl-p-benzoquinone imine (metabolite of Acetaminophen) will generate erroneously low results in samples for patients that have taken an overdose of Acetaminophen.     Alkaline Phosphatase   Date Value Ref Range Status   10/21/2024 47 34 - 104 U/L Final   10/20/2024 45 34 - 104 U/L Final   10/03/2024 34 34 - 104 U/L Final     AST   Date Value Ref Range Status   10/21/2024 12 (L) 13 - 39 U/L Final   10/20/2024 12 (L) 13 - 39 U/L Final   10/03/2024 12 (L) 13 - 39 U/L Final     ALT   Date Value Ref Range Status   10/21/2024 12 7 - 52 U/L Final     Comment:     Specimen collection should occur prior to Sulfasalazine administration due to the potential for falsely depressed results.    10/20/2024 12 7 - 52 U/L Final     Comment:     Specimen collection should occur prior to Sulfasalazine  "administration due to the potential for falsely depressed results.    10/03/2024 12 7 - 52 U/L Final     Comment:     Specimen collection should occur prior to Sulfasalazine administration due to the potential for falsely depressed results.       LD   Date Value Ref Range Status   10/03/2024 461 (H) 140 - 271 U/L Final   09/13/2024 256 140 - 271 U/L Final   08/24/2024 207 140 - 271 U/L Final    No results found for: \"TSH\" No results found for: \"X4EAROQ\"   Free T4   Date Value Ref Range Status   09/13/2024 0.92 0.61 - 1.12 ng/dL Final     Comment:     Specimens with biotin concentrations > 10 ng/mL can lead to significant (> 10%) positive bias in result.   08/24/2024 0.77 0.61 - 1.12 ng/dL Final     Comment:     Specimens with biotin concentrations > 10 ng/mL can lead to significant (> 10%) positive bias in result.      Patient was seen and discussed with attending physician, Nathan Neff M.D.    Salome Underwood D.O.  Hematology-Oncology Fellow (PGY-5)   "

## 2024-10-31 NOTE — PROGRESS NOTES
Progress Note - Internal Medicine   Name: Ayla Nye 74 y.o. female I MRN: [id number]  [facility] Date of Admission: 10/21/2024   Date of Service: 10/31/2024 I Hospital Day: 10    Assessment & Plan  Metastatic cancer to brain (HCC)  Patient with history of non-small cell lung adenocarcinoma diagnosed in March 2024.  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  She completed SRS for these metastatic lesions in August 2024.  She is on Decadron in the outpatient setting and has had multiple admissions due to episodes of altered mental status with increased vasogenic edema after attempting to taper Decadron dosing.  She presented to [facility] on 10/20 with worsening confusion and a stroke alert was called.  Imaging showed no acute findings.  She was loaded with Keppra, started on Decadron, and transferred to [place] for video EEG and MRI brain under neurocritical care for close monitoring.  The morning of 10/21 patient's mental status reportedly much improved and she was deemed stable for transfer out of the ICU.    Symptoms likely in the setting of Decadron taper    Of note, patient is not exactly sure what her current Decadron dosing is.  With that said, it appears that she had been on a Decadron taper throughout September starting at 8 mg daily that was reduced each week.  Her most recent orders show Decadron 1 mg daily from 10/17-10/24 and then 0.5 mg daily for 8 more days    vEEG update as of today: 26 hours of recording. No seizures. Continues to have left hemisphere slowing, left posterior quadrant maximal. There are also left posterior temporal / parietal poorly formed (blunt and at times sharply contoured) LPDs occurring at 0.5-1 Hz. LPDs indicate high seizure risk and are often associated with acute/subacute destructive neuronal injury. Right hemisphere is essentially normal.     Blood cultures no growth after 5 days    Plan:  -  MRI 10/21 show stable vasogenic edema in the left parietotemporal region; previously demonstrated left posterior parietal lesion as well visualized likely due to differences in technique.  No new metastasis, no new acute infarct or hemorrhage.  - Neurosurgery, neurology, and palliative care consulted.  Appreciate assistance  - Per neurosurgery, no emergent neurosurgical intervention warranted  - Per neurology, continue Keppra 100 mg BID , okay to discharge with this dosage.  Also recommended Plavix or aspirin given right monocular inferior temporal vision for impairment at high risk of developing CV A. Follow-up with epilepsy clinic in 4 to 6 weeks  - Monitor on video EEG  - Reached out to Dr. Gamboa, Rad-Onc on 10/25 and recommended slower taper:  Decadron 4mg PO twice daily for 5 days from 10/29/24 to 11/2/24  Decadron 2mg PO twice daily for 5 days from 11/3/24 to 11/7/24  Decadron 2mg PO daily until her next MRI on 12/27 (she can try to self taper to 1mg during this interval if she wishes)   - PPI for gastric prophylaxis in setting of prolonged Decadron use; patient taking Decadron chronically prior to admission.  - Continue monitoring glucose while on dexamethasone therapy  - SBP goal <160  - Stat CT head with any neurological decline for return of altered mental status  - Seizure precautions in place  - Per Neuropsych, patient has the capacity to make informed medical decisions  - Pending for STR  Hypertension  Documented history of hypertension but not on any antihypertensives at home.  Blood pressure is acceptable this admission  Patient and chart review, patient usually taking multipin 5 mg and losartan 50 mg daily before being discontinued in June 2024    BP overnight 120s/70s    Plan:  - Continue amlodipine 5 mg    Mixed hyperlipidemia  Lipid panel 7/20/2024: , TG 76, HDL 60,   ASCVD of 18.4%.  Not on any statin therapy per chart review    Plan:  - Continue Lipitor 20 mg daily  Malignant  neoplasm of upper lobe, right bronchus or lung (HCC)  Patient with a history of non-small cell lung cancer of the right upper lobe with metastasis to the brain diagnosed in March 2024.  She was on weekly carboplatin and paclitaxel with radiation from 5/23/2024 - 7/5/2024.  Further imaging after treatment showed signs concerning of recurrent metastatic NSCLC now with possible metastasis to the pelvis.  For this, it was recommended that she undergo systemic chemoimmunotherapy with carboplatin, pemetrexed, and pembrolizumab.  Her first cycle was on 10/7/2024.    Patient has concern about her CA treatment, and I reached out to her oncologist, Dr. Rosalinda Castro, who agreed that  inpatient onc service would not able to provide more other than to reiterate OP follow up.     Plan:  - Patient following closely with oncology in the outpatient setting  - Remainder of care for active concerns as mentioned above  - CT chest abdomen and pelvis revealed new small bilateral pulmonary nodules, likely metastasis, new right renal lesion(s) concerning for metastasis, significant interval enlargement of necrotic appearing mass in the right ischiorectal fossa, likely metastasis, deceasing RUL necrotic mass with stable and/or decreasing satellite nodularity, improved mediastinal and right hilar adenopathy, stable 2.4 cm CHUYITA groundglass density.  - Patient will f/u with heme-onc on 11/5/24    Hypothyroidism  Continue PTA levothyroxine 50 mcg daily  Palliative care patient  Palliative care following  Given GFR less than 60, as needed Toradol was discontinued today    Patient states that she would like to increase the dose of Tylenol if allowed, Also states that gabapentin reduces her pain to 2/10 and she is able to sleep better.     Plan:   - Continue Tylenol 650 mg every 6 hours  - Continue gabapentin 300 mg at bedtime  - Continue gabapentin 100 mg in the morning and afternoon   - Patient would like to avoid opioids if  possible  Anemia  Hgb 8.9 and  on admission. 9/13/24 folate >22.3, B12 1,268. 9/5/23 MMA wnl. Patient has been having chronic macrocytic anemia since 9/2023.    Plan:  - Continue to monitor hgb daily  Leukocytosis  WBC 11.75 from 10.0 10/26. WBC has been increasing since admission of 3.0. Patient is on dexamethasone. Remains afebrile no URI and urinary symptoms. Afebrile, VSS. Low suspicions for infection at this time. Currently on steroid taper.    Plan:  - Continue to monitor for signs/symptoms of infection      Disposition: Pending STR    Team: SOD TEAM C    Subjective   Patient seen and examined. No acute events overnight.  Patient continues to endorse sore throat with dry cough in the morning but able to tolerate PO. I inform patient that she can get magic mouthwash from RN if needed.  Patient states she had a good sleep. Gabapentin in the morning and afternoon help with her pain. Patient is worried she would not get the correct steroid taper dose at the rehab facility, and she has concern about her cancer treatment. I offer to reach out to her oncologist, Dr. Castro, and patient agrees.  No fever, chills, chest pain/discomfort, abdominal pain, nausea/vomiting/diarrhea, and urinary symptoms. Last bowel movement was this morning. No neurological symptoms.     Objective :  Temp:  [97.7 °F (36.5 °C)-99.6 °F (37.6 °C)] 98.5 °F (36.9 °C)  HR:  [67-73] 73  BP: (102-108)/(56-67) 108/56  Resp:  [16-18] 18  SpO2:  [93 %-98 %] 94 %    I/O         10/20 0701  10/21 0700 10/21 0701  10/22 0700    I.V. (mL/kg) 146.3 442.5 (8.5)    IV Piggyback  50    Total Intake(mL/kg) 146.3 492.5 (9.4)    Urine (mL/kg/hr) 650 1165 (0.9)    Total Output 650 1165    Net -503.8 -672.5          Unmeasured Urine Occurrence  1 x          Weights:        Body mass index is 21.25 kg/m².  Weight (last 2 days)       Date/Time Weight    10/31/24 0558 52.7 (116.18)    10/31/24 0525 52.7 (116.18)    10/30/24 0536 52.9 (116.62)    10/29/24 0539  "54 (119.05)            Physical Exam  Vitals and nursing note reviewed.   Constitutional:       General: She is not in acute distress.     Appearance: She is well-developed.   HENT:      Head: Normocephalic and atraumatic.      Mouth/Throat:      Mouth: Mucous membranes are moist.      Pharynx: No oropharyngeal exudate or posterior oropharyngeal erythema.      Comments:     Eyes:      General: No scleral icterus.     Extraocular Movements: Extraocular movements intact.      Conjunctiva/sclera: Conjunctivae normal.   Cardiovascular:      Rate and Rhythm: Normal rate and regular rhythm.      Pulses: Normal pulses.      Heart sounds: Normal heart sounds. No murmur heard.  Pulmonary:      Effort: Pulmonary effort is normal. No respiratory distress.      Comments: Decreased breath sounds in R side more prominent in RUL    Abdominal:      General: Bowel sounds are normal.      Palpations: Abdomen is soft.      Tenderness: There is no abdominal tenderness.   Musculoskeletal:         General: No swelling.      Cervical back: Neck supple.      Right lower leg: No edema.      Left lower leg: No edema.   Lymphadenopathy:      Cervical: No cervical adenopathy.   Skin:     General: Skin is warm and dry.      Capillary Refill: Capillary refill takes less than 2 seconds.      Findings: No bruising or lesion.   Neurological:      General: No focal deficit present.      Mental Status: She is alert and oriented to person, place, and time.      Cranial Nerves: Cranial nerves 2-12 are intact. No cranial nerve deficit.      Motor: No weakness.      Comments: Upper extremities 5/5  Lower extremities 5/5   Psychiatric:         Mood and Affect: Mood normal.           Lab Results: I have reviewed the following results:  No results for input(s): \"WBC\", \"HGB\", \"HCT\", \"PLT\", \"BANDSPCT\", \"SODIUM\", \"K\", \"CL\", \"CO2\", \"BUN\", \"CREATININE\", \"GLUC\", \"CAIONIZED\", \"MG\", \"PHOS\", \"AST\", \"ALT\", \"ALB\", \"TBILI\", \"DBILI\", \"ALKPHOS\", \"PTT\", \"INR\", \"HSTNI0\", " "\"HSTNI2\", \"BNP\", \"LACTICACID\" in the last 72 hours.      Imaging Results Review: I personally reviewed the following image studies in PACS and associated radiology reports: chest xray, CT head, CT A neck and head, and MRI brain. My interpretation of the radiology images/reports is:  .  Other Study Results Review: EKG was reviewed.     Currently Ordered Meds:   Current Facility-Administered Medications:     acetaminophen (TYLENOL) tablet 650 mg, Q6H KRISS    amLODIPine (NORVASC) tablet 5 mg, Daily    atorvastatin (LIPITOR) tablet 20 mg, Daily With Dinner    [COMPLETED] dexamethasone (DECADRON) tablet 4 mg, Q6H KRISS **FOLLOWED BY** dexamethasone (DECADRON) tablet 4 mg, Q12H KRISS **FOLLOWED BY** [START ON 11/3/2024] dexamethasone (DECADRON) tablet 2 mg, Q12H KRISS **FOLLOWED BY** [START ON 11/8/2024] dexamethasone (DECADRON) tablet 2 mg, Daily    diphenhydramine, lidocaine, Al/Mg hydroxide, simethicone (Magic Mouthwash) oral solution 10 mL, Q4H PRN    enoxaparin (LOVENOX) subcutaneous injection 30 mg, Q24H KRISS    gabapentin (NEURONTIN) capsule 100 mg, BID before breakfast/lunch    gabapentin (NEURONTIN) capsule 300 mg, HS    levETIRAcetam (KEPPRA) tablet 1,000 mg, Q12H KRISS    levothyroxine tablet 50 mcg, Early Morning    lidocaine (LIDODERM) 5 % patch 1 patch, Daily    melatonin tablet 6 mg, HS PRN    ondansetron (ZOFRAN) injection 4 mg, Q4H PRN    pantoprazole (PROTONIX) EC tablet 40 mg, Early Morning    senna-docusate sodium (SENOKOT S) 8.6-50 mg per tablet 2 tablet, BID    Facility-Administered Medications Ordered in Other Encounters:     fentaNYL injection, PRN    midazolam (VERSED) injection, PRN  VTE Pharmacologic Prophylaxis: Sequential compression device (Venodyne)  and Enoxaparin (Lovenox)  VTE Mechanical Prophylaxis: sequential compression device    Administrative Statements   I have spent a total time of 45 minutes in caring for this patient on the day of the visit/encounter including Diagnostic results, " Prognosis, Patient and family education, Importance of tx compliance, Counseling / Coordination of care, Documenting in the medical record, Reviewing / ordering tests, medicine, procedures  , and Obtaining or reviewing history  .  Portions of the record may have been created with voice recognition software.

## 2024-11-01 LAB
ANION GAP SERPL CALCULATED.3IONS-SCNC: 3 MMOL/L (ref 4–13)
BUN SERPL-MCNC: 27 MG/DL (ref 5–25)
CALCIUM SERPL-MCNC: 7.8 MG/DL (ref 8.4–10.2)
CHLORIDE SERPL-SCNC: 95 MMOL/L (ref 96–108)
CO2 SERPL-SCNC: 26 MMOL/L (ref 21–32)
CREAT SERPL-MCNC: 0.86 MG/DL (ref 0.6–1.3)
ERYTHROCYTE [DISTWIDTH] IN BLOOD BY AUTOMATED COUNT: 15.7 % (ref 11.6–15.1)
GFR SERPL CREATININE-BSD FRML MDRD: 66 ML/MIN/1.73SQ M
GLUCOSE SERPL-MCNC: 101 MG/DL (ref 65–140)
HCT VFR BLD AUTO: 30.5 % (ref 34.8–46.1)
HGB BLD-MCNC: 9.4 G/DL (ref 11.5–15.4)
MCH RBC QN AUTO: 32.8 PG (ref 26.8–34.3)
MCHC RBC AUTO-ENTMCNC: 30.8 G/DL (ref 31.4–37.4)
MCV RBC AUTO: 106 FL (ref 82–98)
PLATELET # BLD AUTO: 242 THOUSANDS/UL (ref 149–390)
PMV BLD AUTO: 9.5 FL (ref 8.9–12.7)
POTASSIUM SERPL-SCNC: 3.8 MMOL/L (ref 3.5–5.3)
RBC # BLD AUTO: 2.87 MILLION/UL (ref 3.81–5.12)
SODIUM SERPL-SCNC: 124 MMOL/L (ref 135–147)
WBC # BLD AUTO: 12.37 THOUSAND/UL (ref 4.31–10.16)

## 2024-11-01 PROCEDURE — 85027 COMPLETE CBC AUTOMATED: CPT

## 2024-11-01 PROCEDURE — 99232 SBSQ HOSP IP/OBS MODERATE 35: CPT | Performed by: INTERNAL MEDICINE

## 2024-11-01 PROCEDURE — 80048 BASIC METABOLIC PNL TOTAL CA: CPT

## 2024-11-01 RX ADMIN — ENOXAPARIN SODIUM 30 MG: 30 INJECTION SUBCUTANEOUS at 08:37

## 2024-11-01 RX ADMIN — PANTOPRAZOLE SODIUM 40 MG: 40 TABLET, DELAYED RELEASE ORAL at 06:49

## 2024-11-01 RX ADMIN — LEVOTHYROXINE SODIUM 50 MCG: 50 TABLET ORAL at 06:49

## 2024-11-01 RX ADMIN — GABAPENTIN 300 MG: 300 CAPSULE ORAL at 21:04

## 2024-11-01 RX ADMIN — DEXAMETHASONE 4 MG: 4 TABLET ORAL at 17:06

## 2024-11-01 RX ADMIN — ACETAMINOPHEN 650 MG: 325 TABLET, FILM COATED ORAL at 12:58

## 2024-11-01 RX ADMIN — Medication 6 MG: at 23:41

## 2024-11-01 RX ADMIN — AMLODIPINE BESYLATE 5 MG: 5 TABLET ORAL at 08:37

## 2024-11-01 RX ADMIN — LEVETIRACETAM 1000 MG: 500 TABLET, FILM COATED ORAL at 08:37

## 2024-11-01 RX ADMIN — ACETAMINOPHEN 650 MG: 325 TABLET, FILM COATED ORAL at 17:06

## 2024-11-01 RX ADMIN — LEVETIRACETAM 1000 MG: 500 TABLET, FILM COATED ORAL at 21:04

## 2024-11-01 RX ADMIN — DEXAMETHASONE 4 MG: 4 TABLET ORAL at 06:49

## 2024-11-01 RX ADMIN — GABAPENTIN 100 MG: 100 CAPSULE ORAL at 12:58

## 2024-11-01 RX ADMIN — ACETAMINOPHEN 650 MG: 325 TABLET, FILM COATED ORAL at 06:49

## 2024-11-01 RX ADMIN — ATORVASTATIN CALCIUM 20 MG: 20 TABLET, FILM COATED ORAL at 17:06

## 2024-11-01 RX ADMIN — SENNOSIDES AND DOCUSATE SODIUM 2 TABLET: 50; 8.6 TABLET ORAL at 08:37

## 2024-11-01 RX ADMIN — ACETAMINOPHEN 650 MG: 325 TABLET, FILM COATED ORAL at 23:41

## 2024-11-01 RX ADMIN — GABAPENTIN 100 MG: 100 CAPSULE ORAL at 06:49

## 2024-11-01 RX ADMIN — SENNOSIDES AND DOCUSATE SODIUM 2 TABLET: 50; 8.6 TABLET ORAL at 17:06

## 2024-11-01 NOTE — ASSESSMENT & PLAN NOTE
Documented history of hypertension but not on any antihypertensives at home.  Blood pressure is acceptable this admission  Patient and chart review, patient usually taking multipin 5 mg and losartan 50 mg daily before being discontinued in June 2024    BP overnight 100-110s/60-70s    Plan:  - Continue amlodipine 5 mg

## 2024-11-01 NOTE — PROGRESS NOTES
Progress Note - Internal Medicine   Name: Ayla Nye 74 y.o. female I MRN: [id number]  [facility] I Date of Admission: 10/21/2024   Date of Service: 11/1/2024 I Hospital Day: 11    Assessment & Plan  Metastatic cancer to brain (HCC)  Patient with history of non-small cell lung adenocarcinoma diagnosed in March 2024.  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  She completed SRS for these metastatic lesions in August 2024.  She is on Decadron in the outpatient setting and has had multiple admissions due to episodes of altered mental status with increased vasogenic edema after attempting to taper Decadron dosing.  She presented to [facility] on 10/20 with worsening confusion and a stroke alert was called.  Imaging showed no acute findings.  She was loaded with Keppra, started on Decadron, and transferred to [place] for video EEG and MRI brain under neurocritical care for close monitoring.  The morning of 10/21 patient's mental status reportedly much improved and she was deemed stable for transfer out of the ICU.    Symptoms likely in the setting of Decadron taper    Of note, patient is not exactly sure what her current Decadron dosing is.  With that said, it appears that she had been on a Decadron taper throughout September starting at 8 mg daily that was reduced each week.  Her most recent orders show Decadron 1 mg daily from 10/17-10/24 and then 0.5 mg daily for 8 more days    vEEG update as of today: 26 hours of recording. No seizures. Continues to have left hemisphere slowing, left posterior quadrant maximal. There are also left posterior temporal / parietal poorly formed (blunt and at times sharply contoured) LPDs occurring at 0.5-1 Hz. LPDs indicate high seizure risk and are often associated with acute/subacute destructive neuronal injury. Right hemisphere is essentially normal.     Blood cultures no growth after 5 days    Plan:  -  MRI 10/21 show stable vasogenic edema in the left parietotemporal region; previously demonstrated left posterior parietal lesion as well visualized likely due to differences in technique.  No new metastasis, no new acute infarct or hemorrhage.  - Neurosurgery, neurology, and palliative care consulted.  Appreciate assistance  - Per neurosurgery, no emergent neurosurgical intervention warranted  - Per neurology, continue Keppra 100 mg BID , okay to discharge with this dosage.  Also recommended Plavix or aspirin given right monocular inferior temporal vision for impairment at high risk of developing CV A. Follow-up with epilepsy clinic in 4 to 6 weeks  - Monitor on video EEG  - Reached out to Dr. Gamboa, Rad-Onc on 10/25 and recommended slower taper:  Decadron 4mg PO twice daily for 5 days from 10/29/24 to 11/2/24  Decadron 2mg PO twice daily for 5 days from 11/3/24 to 11/7/24  Decadron 2mg PO daily until her next MRI on 12/27 (she can try to self taper to 1mg during this interval if she wishes)   - PPI for gastric prophylaxis in setting of prolonged Decadron use; patient taking Decadron chronically prior to admission.  - Continue monitoring glucose while on dexamethasone therapy  - SBP goal <160  - Stat CT head with any neurological decline for return of altered mental status  - Seizure precautions in place  - Per Neuropsych, patient has the capacity to make informed medical decisions  - Per CM, SNF denied, tentative plan home with home health   Hypertension  Documented history of hypertension but not on any antihypertensives at home.  Blood pressure is acceptable this admission  Patient and chart review, patient usually taking multipin 5 mg and losartan 50 mg daily before being discontinued in June 2024    BP overnight 100-110s/60-70s    Plan:  - Continue amlodipine 5 mg    Mixed hyperlipidemia  Lipid panel 7/20/2024: , TG 76, HDL 60,   ASCVD of 18.4%.  Not on any statin therapy per chart  review    Plan:  - Continue Lipitor 20 mg daily  Malignant neoplasm of upper lobe, right bronchus or lung (HCC)  Patient with a history of non-small cell lung cancer of the right upper lobe with metastasis to the brain diagnosed in March 2024.  She was on weekly carboplatin and paclitaxel with radiation from 5/23/2024 - 7/5/2024.  Further imaging after treatment showed signs concerning of recurrent metastatic NSCLC now with possible metastasis to the pelvis.  For this, it was recommended that she undergo systemic chemoimmunotherapy with carboplatin, pemetrexed, and pembrolizumab.  Her first cycle was on 10/7/2024.    Patient has concern about her CA treatment, and I reached out to her oncologist, Dr. Rosalinda Castro, who agreed that  inpatient onc service would not able to provide more other than to reiterate OP follow up.     Plan:  - Patient following closely with oncology in the outpatient setting  - Remainder of care for active concerns as mentioned above  - CT chest abdomen and pelvis revealed new small bilateral pulmonary nodules, likely metastasis, new right renal lesion(s) concerning for metastasis, significant interval enlargement of necrotic appearing mass in the right ischiorectal fossa, likely metastasis, deceasing RUL necrotic mass with stable and/or decreasing satellite nodularity, improved mediastinal and right hilar adenopathy, stable 2.4 cm CHUYITA groundglass density.  - Patient will f/u with heme-onc on 11/5/24    Hypothyroidism  Continue PTA levothyroxine 50 mcg daily  Palliative care patient  Palliative care following     Patient states pain is stable or well-controlled with scheduled Tylenol and Gabapentin.     Plan:   - Continue Tylenol 650 mg every 6 hours  - Continue gabapentin 300 mg at bedtime  - Continue gabapentin 100 mg in the morning and afternoon   - Patient would like to avoid opioids if possible  Anemia  Hgb 8.9 and  on admission. 9/13/24 folate >22.3, B12 1,268. 9/5/23 MMA wnl.  Patient has been having chronic macrocytic anemia since 9/2023.    Plan:  - Continue to monitor hgb   Leukocytosis  WBC 11.75 from 10.0 10/26. WBC has been increasing since admission of 3.0. Patient is on dexamethasone. Remains afebrile no URI and urinary symptoms. Afebrile, VSS. Low suspicions for infection at this time. Currently on steroid taper.    Plan:  - Continue to monitor for signs/symptoms of infection      Disposition: potential dc on Monday    Team: SOD TEAM C    Subjective   Patient seen and examined. No acute events overnight.  Patient has no acute complaints. She states that sore throat is not bothering her at this point, she is able to tolerate PO intake. Patient is aware that she can get magic mouth wash if needed. Patient states she had a good sleep. Pain has been stable with gabapentin. No fever, chills, chest pain/discomfort, abdominal pain, nausea/vomiting/diarrhea, and urinary symptoms. Last bowel movement was this morning. No neurological symptoms.     Objective :  Temp:  [98.4 °F (36.9 °C)-98.6 °F (37 °C)] 98.6 °F (37 °C)  HR:  [60-74] 67  BP: (107-135)/(64-76) 116/67  Resp:  [16] 16  SpO2:  [95 %-98 %] 95 %    I/O         10/20 0701  10/21 0700 10/21 0701  10/22 0700    I.V. (mL/kg) 146.3 442.5 (8.5)    IV Piggyback  50    Total Intake(mL/kg) 146.3 492.5 (9.4)    Urine (mL/kg/hr) 650 1165 (0.9)    Total Output 650 1165    Net -503.8 -672.5          Unmeasured Urine Occurrence  1 x          Weights:        Body mass index is 21.25 kg/m².  Weight (last 2 days)       Date/Time Weight    10/31/24 0558 52.7 (116.18)    10/31/24 0525 52.7 (116.18)    10/30/24 0536 52.9 (116.62)            Physical Exam  Vitals and nursing note reviewed.   Constitutional:       General: She is not in acute distress.     Appearance: She is well-developed.   HENT:      Head: Normocephalic and atraumatic.      Mouth/Throat:      Mouth: Mucous membranes are moist.      Pharynx: No oropharyngeal exudate or posterior  "oropharyngeal erythema.      Comments:     Eyes:      General: No scleral icterus.     Extraocular Movements: Extraocular movements intact.      Conjunctiva/sclera: Conjunctivae normal.   Cardiovascular:      Rate and Rhythm: Normal rate and regular rhythm.      Pulses: Normal pulses.      Heart sounds: Normal heart sounds. No murmur heard.  Pulmonary:      Effort: Pulmonary effort is normal. No respiratory distress.      Comments: Decreased breath sounds in R side more prominent in RUL    Abdominal:      General: Bowel sounds are normal.      Palpations: Abdomen is soft.      Tenderness: There is no abdominal tenderness.   Musculoskeletal:         General: No swelling.      Cervical back: Neck supple.      Right lower leg: No edema.      Left lower leg: No edema.   Lymphadenopathy:      Cervical: No cervical adenopathy.   Skin:     General: Skin is warm and dry.      Capillary Refill: Capillary refill takes less than 2 seconds.      Findings: No bruising or lesion.   Neurological:      General: No focal deficit present.      Mental Status: She is alert and oriented to person, place, and time.      Cranial Nerves: Cranial nerves 2-12 are intact. No cranial nerve deficit.      Motor: No weakness.      Comments: Upper extremities 5/5  Lower extremities 5/5   Psychiatric:         Mood and Affect: Mood normal.           Lab Results: I have reviewed the following results:  No results for input(s): \"WBC\", \"HGB\", \"HCT\", \"PLT\", \"BANDSPCT\", \"SODIUM\", \"K\", \"CL\", \"CO2\", \"BUN\", \"CREATININE\", \"GLUC\", \"CAIONIZED\", \"MG\", \"PHOS\", \"AST\", \"ALT\", \"ALB\", \"TBILI\", \"DBILI\", \"ALKPHOS\", \"PTT\", \"INR\", \"HSTNI0\", \"HSTNI2\", \"BNP\", \"LACTICACID\" in the last 72 hours.      Imaging Results Review: I personally reviewed the following image studies in PACS and associated radiology reports: chest xray, CT head, CT A neck and head, and MRI brain. My interpretation of the radiology images/reports is:  .  Other Study Results Review: EKG was reviewed. "     Currently Ordered Meds:   Current Facility-Administered Medications:     acetaminophen (TYLENOL) tablet 650 mg, Q6H KRISS    amLODIPine (NORVASC) tablet 5 mg, Daily    atorvastatin (LIPITOR) tablet 20 mg, Daily With Dinner    [COMPLETED] dexamethasone (DECADRON) tablet 4 mg, Q6H KRISS **FOLLOWED BY** dexamethasone (DECADRON) tablet 4 mg, Q12H KRISS **FOLLOWED BY** [START ON 11/3/2024] dexamethasone (DECADRON) tablet 2 mg, Q12H KRISS **FOLLOWED BY** [START ON 11/8/2024] dexamethasone (DECADRON) tablet 2 mg, Daily    diphenhydramine, lidocaine, Al/Mg hydroxide, simethicone (Magic Mouthwash) oral solution 10 mL, Q4H PRN    enoxaparin (LOVENOX) subcutaneous injection 30 mg, Q24H KRISS    gabapentin (NEURONTIN) capsule 100 mg, BID before breakfast/lunch    gabapentin (NEURONTIN) capsule 300 mg, HS    levETIRAcetam (KEPPRA) tablet 1,000 mg, Q12H KRISS    levothyroxine tablet 50 mcg, Early Morning    lidocaine (LIDODERM) 5 % patch 1 patch, Daily    melatonin tablet 6 mg, HS PRN    ondansetron (ZOFRAN) injection 4 mg, Q4H PRN    pantoprazole (PROTONIX) EC tablet 40 mg, Early Morning    senna-docusate sodium (SENOKOT S) 8.6-50 mg per tablet 2 tablet, BID    Facility-Administered Medications Ordered in Other Encounters:     fentaNYL injection, PRN    midazolam (VERSED) injection, PRN  VTE Pharmacologic Prophylaxis: Sequential compression device (Venodyne)  and Enoxaparin (Lovenox)  VTE Mechanical Prophylaxis: sequential compression device    Administrative Statements   I have spent a total time of 45 minutes in caring for this patient on the day of the visit/encounter including Diagnostic results, Prognosis, Patient and family education, Importance of tx compliance, Counseling / Coordination of care, Documenting in the medical record, Reviewing / ordering tests, medicine, procedures  , and Obtaining or reviewing history  .  Portions of the record may have been created with voice recognition software.

## 2024-11-01 NOTE — PLAN OF CARE
Problem: PAIN - ADULT  Goal: Verbalizes/displays adequate comfort level or baseline comfort level  Description: Interventions:  - Encourage patient to monitor pain and request assistance  - Assess pain using appropriate pain scale  - Administer analgesics based on type and severity of pain and evaluate response  - Implement non-pharmacological measures as appropriate and evaluate response  - Consider cultural and social influences on pain and pain management  - Notify physician/advanced practitioner if interventions unsuccessful or patient reports new pain  Outcome: Progressing     Problem: INFECTION - ADULT  Goal: Absence or prevention of progression during hospitalization  Description: INTERVENTIONS:  - Assess and monitor for signs and symptoms of infection  - Monitor lab/diagnostic results  - Monitor all insertion sites, i.e. indwelling lines, tubes, and drains  - Monitor endotracheal if appropriate and nasal secretions for changes in amount and color  - [place] appropriate cooling/warming therapies per order  - Administer medications as ordered  - Instruct and encourage patient and family to use good hand hygiene technique  - Identify and instruct in appropriate isolation precautions for identified infection/condition  Outcome: Progressing     Problem: SAFETY ADULT  Goal: Patient will remain free of falls  Description: INTERVENTIONS:  - Educate patient/family on patient safety including physical limitations  - Instruct patient to call for assistance with activity   - Consult OT/PT to assist with strengthening/mobility   - Keep Call bell within reach  - Keep bed low and locked with side rails adjusted as appropriate  - Keep care items and personal belongings within reach  - Initiate and maintain comfort rounds  - Make Fall Risk Sign visible to staff  - Offer Toileting every 2 Hours, in advance of need  - Initiate/Maintain bed alarm  - Apply yellow socks and bracelet for high fall risk patients  - Consider  moving patient to room near nurses station  Outcome: Progressing  Goal: Maintain or return to baseline ADL function  Description: INTERVENTIONS:  -  Assess patient's ability to carry out ADLs; assess patient's baseline for ADL function and identify physical deficits which impact ability to perform ADLs (bathing, care of mouth/teeth, toileting, grooming, dressing, etc.)  - Assess/evaluate cause of self-care deficits   - Assess range of motion  - Assess patient's mobility; develop plan if impaired  - Assess patient's need for assistive devices and provide as appropriate  - Encourage maximum independence but intervene and supervise when necessary  - Involve family in performance of ADLs  - Assess for home care needs following discharge   - Consider OT consult to assist with ADL evaluation and planning for discharge  - Provide patient education as appropriate  Outcome: Progressing  Goal: Maintains/Returns to pre admission functional level  Description: INTERVENTIONS:  - Perform AM-PAC 6 Click Basic Mobility/ Daily Activity assessment daily.  - Set and communicate daily mobility goal to care team and patient/family/caregiver.   - Collaborate with rehabilitation services on mobility goals if consulted  - Perform Range of Motion 3 times a day.  - Reposition patient every 2 hours.  - Dangle patient 3 times a day  - Stand patient 2 times a day  - Ambulate patient 3 times a day  - Out of bed to chair 2 times a day   - Out of bed for meals 3 times a day  - Out of bed for toileting  - Record patient progress and toleration of activity level   Outcome: Progressing     Problem: DISCHARGE PLANNING  Goal: Discharge to home or other facility with appropriate resources  Description: INTERVENTIONS:  - Identify barriers to discharge w/patient and caregiver  - Arrange for needed discharge resources and transportation as appropriate  - Identify discharge learning needs (meds, wound care, etc.)  - Arrange for interpretive services to  assist at discharge as needed  - Refer to Case Management Department for coordinating discharge planning if the patient needs post-hospital services based on physician/advanced practitioner order or complex needs related to functional status, cognitive ability, or social support system  Outcome: Progressing     Problem: Knowledge Deficit  Goal: Patient/family/caregiver demonstrates understanding of disease process, treatment plan, medications, and discharge instructions  Description: Complete learning assessment and assess knowledge base.  Interventions:  - Provide teaching at level of understanding  - Provide teaching via preferred learning methods  Outcome: Progressing     Problem: NEUROSENSORY - ADULT  Goal: Achieves stable or improved neurological status  Description: INTERVENTIONS  - Monitor and report changes in neurological status  - Monitor vital signs such as temperature, blood pressure, glucose, and any other labs ordered   - Initiate measures to prevent increased intracranial pressure  - Monitor for seizure activity and implement precautions if appropriate      Outcome: Progressing  Goal: Remains free of injury related to seizures activity  Description: INTERVENTIONS  - Maintain airway, patient safety  and administer oxygen as ordered  - Monitor patient for seizure activity, document and report duration and description of seizure to physician/advanced practitioner  - If seizure occurs,  ensure patient safety during seizure  - Reorient patient post seizure  - Seizure pads on all 4 side rails  - Instruct patient/family to notify RN of any seizure activity including if an aura is experienced  - Instruct patient/family to call for assistance with activity based on nursing assessment  - Administer anti-seizure medications if ordered    Outcome: Progressing  Goal: Achieves maximal functionality and self care  Description: INTERVENTIONS  - Monitor swallowing and airway patency with patient fatigue and changes in  neurological status  - Encourage and assist patient to increase activity and self care.   - Encourage visually impaired, hearing impaired and aphasic patients to use assistive/communication devices  Outcome: Progressing     Problem: Prexisting or High Potential for Compromised Skin Integrity  Goal: Skin integrity is maintained or improved  Description: INTERVENTIONS:  - Identify patients at risk for skin breakdown  - Assess and monitor skin integrity  - Assess and monitor nutrition and hydration status  - Monitor labs   - Assess for incontinence   - Turn and reposition patient  - Assist with mobility/ambulation  - Relieve pressure over bony prominences  - Avoid friction and shearing  - Provide appropriate hygiene as needed including keeping skin clean and dry  - Evaluate need for skin moisturizer/barrier cream  - Collaborate with interdisciplinary team   - Patient/family teaching  - Consider wound care consult   Outcome: Progressing

## 2024-11-01 NOTE — CASE MANAGEMENT
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     Palliative care patient 2024     Cancer associated pain 2024     Goals of care, counseling/discussion 2024     Right hip pain 09/10/2024     Altered mental status 2024     Hypothyroidism 2024     Abnormal imaging of central nervous system 2024     Acute metabolic encephalopathy 2024     Malignant neoplasm metastatic to brain (HCC) 2024     Brain mass 2024     Metastatic cancer to brain (HCC) 2024     Visual disturbance 2024     Dehydration 2024     Moderate protein-calorie malnutrition (HCC) 2024     Bilateral leg pain 2024     Insomnia 2024     Malignant neoplasm of upper lobe, right bronchus or lung (HCC) 2024     Age-related osteoporosis without current pathological fracture 2023     Encounter for monitoring of hydroxyurea therapy 2023     JAK2 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 2023     Hypertension 08/10/2022     Mixed hyperlipidemia 08/10/2022     Essential thrombocytosis (HCC) 2020     TB lung, 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     Psoriatic arthritis (HCC) 09/10/2019       LOS (days): 11  Geometric Mean LOS (GMLOS) (days): 4.9  Days to GMLOS:-6.6     OBJECTIVE:  Risk of Unplanned Readmission Score: 31.19         Current admission status: Inpatient   Preferred Pharmacy:   [id number]  [place] - [address] [place] [address]  Phone: [phone] Fax: [phone]    Primary Care  Provider: Rafy Angelo MD    Primary Insurance: MEDICARE  Secondary Insurance: AARP    DISCHARGE DETAILS:         Requested Home Health Care         Is the patient interested in HHC at discharge?: Yes  Home Health Discipline requested:: Nursing, Occupational Therapy, Physical Therapy  Home Health Agency Name:: [facility]  Home Health Follow-Up Provider:: PCP  Home Health Services Needed:: Evaluate Functional Status and Safety, Strengthening/Theraputic Exercises to Improve Function, Other (comment) (medication management)         Other Referral/Resources/Interventions Provided:  Referral Comments: CM placed referral in [facility] for [facility]            Additional Comments: Daughter Juliana spoke to admissions director at [facility]- was told that her mother does not have a skilled need for STR.  CM spoke to Juliana, who desires her mother to have [facility] on discharge.  She stated that she was told her mother would be going home on Monday

## 2024-11-01 NOTE — ASSESSMENT & PLAN NOTE
Hgb 8.9 and  on admission. 9/13/24 folate >22.3, B12 1,268. 9/5/23 MMA wnl. Patient has been having chronic macrocytic anemia since 9/2023.    Plan:  - Continue to monitor hgb

## 2024-11-01 NOTE — ASSESSMENT & PLAN NOTE
Patient with history of non-small cell lung adenocarcinoma diagnosed in March 2024.  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  She completed SRS for these metastatic lesions in August 2024.  She is on Decadron in the outpatient setting and has had multiple admissions due to episodes of altered mental status with increased vasogenic edema after attempting to taper Decadron dosing.  She presented to [facility] on 10/20 with worsening confusion and a stroke alert was called.  Imaging showed no acute findings.  She was loaded with Keppra, started on Decadron, and transferred to [place] for video EEG and MRI brain under neurocritical care for close monitoring.  The morning of 10/21 patient's mental status reportedly much improved and she was deemed stable for transfer out of the ICU.    Symptoms likely in the setting of Decadron taper    Of note, patient is not exactly sure what her current Decadron dosing is.  With that said, it appears that she had been on a Decadron taper throughout September starting at 8 mg daily that was reduced each week.  Her most recent orders show Decadron 1 mg daily from 10/17-10/24 and then 0.5 mg daily for 8 more days    vEEG update as of today: 26 hours of recording. No seizures. Continues to have left hemisphere slowing, left posterior quadrant maximal. There are also left posterior temporal / parietal poorly formed (blunt and at times sharply contoured) LPDs occurring at 0.5-1 Hz. LPDs indicate high seizure risk and are often associated with acute/subacute destructive neuronal injury. Right hemisphere is essentially normal.     Blood cultures no growth after 5 days    Plan:  - MRI 10/21 show stable vasogenic edema in the left parietotemporal region; previously demonstrated left posterior parietal lesion as well visualized likely due to differences in technique.  No new metastasis, no new acute infarct or hemorrhage.  -  Neurosurgery, neurology, and palliative care consulted.  Appreciate assistance  - Per neurosurgery, no emergent neurosurgical intervention warranted  - Per neurology, continue Keppra 100 mg BID , okay to discharge with this dosage.  Also recommended Plavix or aspirin given right monocular inferior temporal vision for impairment at high risk of developing CV A. Follow-up with epilepsy clinic in 4 to 6 weeks  - Monitor on video EEG  - Reached out to Dr. Gamboa, Rad-Onc on 10/25 and recommended slower taper:  Decadron 4mg PO twice daily for 5 days from 10/29/24 to 11/2/24  Decadron 2mg PO twice daily for 5 days from 11/3/24 to 11/7/24  Decadron 2mg PO daily until her next MRI on 12/27 (she can try to self taper to 1mg during this interval if she wishes)   - PPI for gastric prophylaxis in setting of prolonged Decadron use; patient taking Decadron chronically prior to admission.  - Continue monitoring glucose while on dexamethasone therapy  - SBP goal <160  - Stat CT head with any neurological decline for return of altered mental status  - Seizure precautions in place  - Per Neuropsych, patient has the capacity to make informed medical decisions  - Per CM, SNF denied, tentative plan home with home health

## 2024-11-01 NOTE — ASSESSMENT & PLAN NOTE
Palliative care following     Patient states pain is stable or well-controlled with scheduled Tylenol and Gabapentin.     Plan:   - Continue Tylenol 650 mg every 6 hours  - Continue gabapentin 300 mg at bedtime  - Continue gabapentin 100 mg in the morning and afternoon   - Patient would like to avoid opioids if possible

## 2024-11-01 NOTE — QUICK NOTE
Chart reviewed. Did not see patient. Awaiting STR. Pain controlled on current regimen of scheduled tylenol + gabapentin [phone]. Wants to avoid opioids. Dexamethasone taper to 2mg daily on hospital discharge. Patient placed on hospital f/u list.   Palliative care will follow intermittently as needed. Palliative care will return in-person on Monday 11/4/24, please contact palliative care on-call with questions/concerns.    Ramirez Fontanez, DO  Palliative and Supportive Care  [phone]

## 2024-11-02 PROBLEM — U07.1 COVID-19: Status: ACTIVE | Noted: 2024-11-02

## 2024-11-02 PROBLEM — E87.1 HYPONATREMIA: Status: ACTIVE | Noted: 2024-11-02

## 2024-11-02 LAB
ANION GAP SERPL CALCULATED.3IONS-SCNC: 7 MMOL/L (ref 4–13)
BUN SERPL-MCNC: 23 MG/DL (ref 5–25)
CALCIUM SERPL-MCNC: 8.1 MG/DL (ref 8.4–10.2)
CHLORIDE SERPL-SCNC: 106 MMOL/L (ref 96–108)
CO2 SERPL-SCNC: 25 MMOL/L (ref 21–32)
CREAT SERPL-MCNC: 0.78 MG/DL (ref 0.6–1.3)
FLUAV RNA RESP QL NAA+PROBE: NEGATIVE
FLUBV RNA RESP QL NAA+PROBE: NEGATIVE
GFR SERPL CREATININE-BSD FRML MDRD: 75 ML/MIN/1.73SQ M
GLUCOSE SERPL-MCNC: 154 MG/DL (ref 65–140)
OSMOLALITY UR/SERPL-RTO: 296 MMOL/KG (ref 282–298)
POTASSIUM SERPL-SCNC: 3.9 MMOL/L (ref 3.5–5.3)
RSV RNA RESP QL NAA+PROBE: NEGATIVE
SARS-COV-2 RNA RESP QL NAA+PROBE: POSITIVE
SODIUM SERPL-SCNC: 138 MMOL/L (ref 135–147)

## 2024-11-02 PROCEDURE — 99232 SBSQ HOSP IP/OBS MODERATE 35: CPT | Performed by: INTERNAL MEDICINE

## 2024-11-02 PROCEDURE — 0241U HB NFCT DS VIR RESP RNA 4 TRGT: CPT

## 2024-11-02 PROCEDURE — 80048 BASIC METABOLIC PNL TOTAL CA: CPT

## 2024-11-02 PROCEDURE — 83930 ASSAY OF BLOOD OSMOLALITY: CPT

## 2024-11-02 RX ADMIN — PANTOPRAZOLE SODIUM 40 MG: 40 TABLET, DELAYED RELEASE ORAL at 05:49

## 2024-11-02 RX ADMIN — Medication 6 MG: at 23:26

## 2024-11-02 RX ADMIN — LEVETIRACETAM 1000 MG: 500 TABLET, FILM COATED ORAL at 08:30

## 2024-11-02 RX ADMIN — GABAPENTIN 100 MG: 100 CAPSULE ORAL at 05:50

## 2024-11-02 RX ADMIN — ACETAMINOPHEN 650 MG: 325 TABLET, FILM COATED ORAL at 18:34

## 2024-11-02 RX ADMIN — ENOXAPARIN SODIUM 30 MG: 30 INJECTION SUBCUTANEOUS at 08:30

## 2024-11-02 RX ADMIN — LEVOTHYROXINE SODIUM 50 MCG: 50 TABLET ORAL at 05:49

## 2024-11-02 RX ADMIN — ACETAMINOPHEN 650 MG: 325 TABLET, FILM COATED ORAL at 12:01

## 2024-11-02 RX ADMIN — GABAPENTIN 300 MG: 300 CAPSULE ORAL at 21:36

## 2024-11-02 RX ADMIN — NIRMATRELVIR AND RITONAVIR 3 TABLET: KIT at 12:01

## 2024-11-02 RX ADMIN — NIRMATRELVIR AND RITONAVIR 3 TABLET: KIT at 18:35

## 2024-11-02 RX ADMIN — ACETAMINOPHEN 650 MG: 325 TABLET, FILM COATED ORAL at 05:48

## 2024-11-02 RX ADMIN — SENNOSIDES AND DOCUSATE SODIUM 2 TABLET: 50; 8.6 TABLET ORAL at 18:34

## 2024-11-02 RX ADMIN — LEVETIRACETAM 1000 MG: 500 TABLET, FILM COATED ORAL at 21:37

## 2024-11-02 RX ADMIN — DEXAMETHASONE 4 MG: 4 TABLET ORAL at 05:49

## 2024-11-02 RX ADMIN — GABAPENTIN 100 MG: 100 CAPSULE ORAL at 12:01

## 2024-11-02 RX ADMIN — ACETAMINOPHEN 650 MG: 325 TABLET, FILM COATED ORAL at 23:26

## 2024-11-02 RX ADMIN — DEXAMETHASONE 4 MG: 4 TABLET ORAL at 18:34

## 2024-11-02 NOTE — ASSESSMENT & PLAN NOTE
Documented history of hypertension but not on any antihypertensives at home.  Blood pressure is acceptable this admission  Patient and chart review, patient usually taking multipin 5 mg and losartan 50 mg daily before being discontinued in June 2024    BP overnight 100-130s/70-80s    Plan:  - Continue amlodipine 5 mg

## 2024-11-02 NOTE — PROGRESS NOTES
Progress Note - Internal Medicine   Name: Ayla Nye 74 y.o. female I MRN: [id number]  [facility] Date of Admission: 10/21/2024   Date of Service: 11/2/2024 I Hospital Day: 12    Assessment & Plan  Metastatic cancer to brain (HCC)  Patient with history of non-small cell lung adenocarcinoma diagnosed in March 2024.  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  She completed SRS for these metastatic lesions in August 2024.  She is on Decadron in the outpatient setting and has had multiple admissions due to episodes of altered mental status with increased vasogenic edema after attempting to taper Decadron dosing.  She presented to [facility] on 10/20 with worsening confusion and a stroke alert was called.  Imaging showed no acute findings.  She was loaded with Keppra, started on Decadron, and transferred to [place] for video EEG and MRI brain under neurocritical care for close monitoring.  The morning of 10/21 patient's mental status reportedly much improved and she was deemed stable for transfer out of the ICU.    Symptoms likely in the setting of Decadron taper    Of note, patient is not exactly sure what her current Decadron dosing is.  With that said, it appears that she had been on a Decadron taper throughout September starting at 8 mg daily that was reduced each week.  Her most recent orders show Decadron 1 mg daily from 10/17-10/24 and then 0.5 mg daily for 8 more days    vEEG update as of today: 26 hours of recording. No seizures. Continues to have left hemisphere slowing, left posterior quadrant maximal. There are also left posterior temporal / parietal poorly formed (blunt and at times sharply contoured) LPDs occurring at 0.5-1 Hz. LPDs indicate high seizure risk and are often associated with acute/subacute destructive neuronal injury. Right hemisphere is essentially normal.     Blood cultures no growth after 5 days    Plan:  -  MRI 10/21 show stable vasogenic edema in the left parietotemporal region; previously demonstrated left posterior parietal lesion as well visualized likely due to differences in technique.  No new metastasis, no new acute infarct or hemorrhage.  - Neurosurgery, neurology, and palliative care consulted.  Appreciate assistance  - Per neurosurgery, no emergent neurosurgical intervention warranted  - Per neurology, continue Keppra 100 mg BID , okay to discharge with this dosage.  Also recommended Plavix or aspirin given right monocular inferior temporal vision for impairment at high risk of developing CV A. Follow-up with epilepsy clinic in 4 to 6 weeks  - Monitor on video EEG  - Reached out to Dr. Gamboa, Rad-Onc on 10/25 and recommended slower taper:  Decadron 4mg PO twice daily for 5 days from 10/29/24 to 11/2/24  Decadron 2mg PO twice daily for 5 days from 11/3/24 to 11/7/24  Decadron 2mg PO daily until her next MRI on 12/27 (she can try to self taper to 1mg during this interval if she wishes)   - PPI for gastric prophylaxis in setting of prolonged Decadron use; patient taking Decadron chronically prior to admission.  - Continue monitoring glucose while on dexamethasone therapy  - SBP goal <160  - Stat CT head with any neurological decline for return of altered mental status  - Seizure precautions in place  - Per Neuropsych, patient has the capacity to make informed medical decisions  - Per CM, SNF denied, tentative plan home with home health on Monday  Hypertension  Documented history of hypertension but not on any antihypertensives at home.  Blood pressure is acceptable this admission  Patient and chart review, patient usually taking multipin 5 mg and losartan 50 mg daily before being discontinued in June 2024    BP overnight 100-130s/70-80s    Plan:  - Continue amlodipine 5 mg    Mixed hyperlipidemia  Lipid panel 7/20/2024: , TG 76, HDL 60,   ASCVD of 18.4%.  Not on any statin therapy per chart  review    Plan:  - Hold Lipitor 20 mg daily given starting Paxlovid therapy   Malignant neoplasm of upper lobe, right bronchus or lung (HCC)  Patient with a history of non-small cell lung cancer of the right upper lobe with metastasis to the brain diagnosed in March 2024.  She was on weekly carboplatin and paclitaxel with radiation from 5/23/2024 - 7/5/2024.  Further imaging after treatment showed signs concerning of recurrent metastatic NSCLC now with possible metastasis to the pelvis.  For this, it was recommended that she undergo systemic chemoimmunotherapy with carboplatin, pemetrexed, and pembrolizumab.  Her first cycle was on 10/7/2024.    Patient has concern about her CA treatment, and I reached out to her oncologist, Dr. Rosalinda Castro, who agreed that  inpatient onc service would not able to provide more other than to reiterate OP follow up.     Plan:  - Patient following closely with oncology in the outpatient setting  - Remainder of care for active concerns as mentioned above  - CT chest abdomen and pelvis revealed new small bilateral pulmonary nodules, likely metastasis, new right renal lesion(s) concerning for metastasis, significant interval enlargement of necrotic appearing mass in the right ischiorectal fossa, likely metastasis, deceasing RUL necrotic mass with stable and/or decreasing satellite nodularity, improved mediastinal and right hilar adenopathy, stable 2.4 cm CHUYITA groundglass density.  - Patient will f/u with heme-onc on 11/5/24    Hypothyroidism  Continue PTA levothyroxine 50 mcg daily  Palliative care patient  Palliative care following     Patient states pain is stable or well-controlled with scheduled Tylenol and Gabapentin.     Plan:   - Continue Tylenol 650 mg every 6 hours  - Continue gabapentin 300 mg at bedtime  - Continue gabapentin 100 mg in the morning and afternoon   - Patient would like to avoid opioids if possible  Anemia  Hgb 8.9 and  on admission. 9/13/24 folate >22.3,  B12 1,268. 9/5/23 MMA wnl. Patient has been having chronic macrocytic anemia since 9/2023.    Plan:  - Continue to monitor hgb   Leukocytosis  WBC 11.75 from 10.0 10/26. WBC has been increasing since admission of 3.0. Patient is on dexamethasone. Remains afebrile no URI and urinary symptoms. Afebrile, VSS. Low suspicions for infection at this time. Currently on steroid taper.    Patient is concerned that she may have COVID given her daughter tested positive for COVID. She does have mild sore throat, dry cough, and rhinorrhea. Denies fever, chills, and myalgias.     Plan:  - Leukocytosis most likely 2/2 steroid and COVID  Hyponatremia  Sodium 124 on 11/1/24  Euvolemic on exam. TSH 0.54 10/21/24. Hx of adenocarcinoma lung CA with mets to brain. Pain on baseline. Most likely SIADH picture.  No neuro deficits. AAOX3.    Sodium this morning 11/2/24: 138    Plan:  - Continue to monitor   - Will remove fluid restriction of 1.8L  - F/u on serum osmo, urine osmo, and urine sodium    COVID-19  Patient has been having sore throat, dry cough in the morning, and rhinorrhea. Patient's daughter tested positive for COVID-19..   COVID test came back positive.  Patient is afebrile and sating well on RA >90%, without need of O2.   Given age and history of cancer, we will treat.    Plan  - Paxlovid for 5 days (D1/5)  - Continue to monitor O2 need        Disposition: potential dc on Monday    Team: SOD TEAM C    Subjective   Patient seen and examined. Patient is upset that they missed her breakfast order this morning. I help patient to order her breakfast. Patient is concerned that she may have COVID given her daughter tested positive for COVID. Pain has been stable with gabapentin. No fever, chills, chest pain/discomfort, abdominal pain, nausea/vomiting/diarrhea, and urinary symptoms. Last bowel movement was this morning. No neurological symptoms.     Objective :  Temp:  [97.5 °F (36.4 °C)-99.2 °F (37.3 °C)] 97.8 °F (36.6 °C)  HR:   [63-73] 73  BP: (106-131)/(70-80) 131/80  Resp:  [18] 18  SpO2:  [95 %-97 %] 97 %  O2 Device: None (Room air)    I/O         10/20 0701  10/21 0700 10/21 0701  10/22 0700    I.V. (mL/kg) 146.3 442.5 (8.5)    IV Piggyback  50    Total Intake(mL/kg) 146.3 492.5 (9.4)    Urine (mL/kg/hr) 650 1165 (0.9)    Total Output 650 1165    Net -503.8 -672.5          Unmeasured Urine Occurrence  1 x          Weights:        Body mass index is 19.96 kg/m².  Weight (last 2 days)       Date/Time Weight    11/02/24 0600 49.5 (109.13)    11/01/24 0600 50.5 (111.33)    10/31/24 0558 52.7 (116.18)    10/31/24 0525 52.7 (116.18)            Physical Exam  Vitals and nursing note reviewed.   Constitutional:       General: She is not in acute distress.     Appearance: She is well-developed.   HENT:      Head: Normocephalic and atraumatic.      Mouth/Throat:      Mouth: Mucous membranes are moist.      Pharynx: No oropharyngeal exudate or posterior oropharyngeal erythema.      Comments:     Eyes:      General: No scleral icterus.     Extraocular Movements: Extraocular movements intact.      Conjunctiva/sclera: Conjunctivae normal.   Cardiovascular:      Rate and Rhythm: Normal rate and regular rhythm.      Pulses: Normal pulses.      Heart sounds: Normal heart sounds. No murmur heard.  Pulmonary:      Effort: Pulmonary effort is normal. No respiratory distress.      Comments: Decreased breath sounds in R side more prominent in RUL    Abdominal:      General: Bowel sounds are normal.      Palpations: Abdomen is soft.      Tenderness: There is no abdominal tenderness.   Musculoskeletal:         General: No swelling.      Cervical back: Neck supple.      Right lower leg: No edema.      Left lower leg: No edema.   Lymphadenopathy:      Cervical: No cervical adenopathy.   Skin:     General: Skin is warm and dry.      Capillary Refill: Capillary refill takes less than 2 seconds.      Findings: No bruising or lesion.   Neurological:      General:  No focal deficit present.      Mental Status: She is alert and oriented to person, place, and time.      Cranial Nerves: Cranial nerves 2-12 are intact. No cranial nerve deficit.      Motor: No weakness.      Comments: Upper extremities 5/5  Lower extremities 5/5   Psychiatric:         Mood and Affect: Mood normal.           Lab Results: I have reviewed the following results:  Recent Labs     11/01/24  1706   WBC 12.37*   HGB 9.4*   HCT 30.5*      SODIUM 124*   K 3.8   CL 95*   CO2 26   BUN 27*   CREATININE 0.86   GLUC 101         Imaging Results Review: I personally reviewed the following image studies in PACS and associated radiology reports: chest xray, CT head, CT A neck and head, and MRI brain. My interpretation of the radiology images/reports is:  .  Other Study Results Review: EKG was reviewed.     Currently Ordered Meds:   Current Facility-Administered Medications:     acetaminophen (TYLENOL) tablet 650 mg, Q6H KRISS    amLODIPine (NORVASC) tablet 5 mg, Daily    atorvastatin (LIPITOR) tablet 20 mg, Daily With Dinner    [COMPLETED] dexamethasone (DECADRON) tablet 4 mg, Q6H KRISS **FOLLOWED BY** dexamethasone (DECADRON) tablet 4 mg, Q12H KRISS **FOLLOWED BY** [START ON 11/3/2024] dexamethasone (DECADRON) tablet 2 mg, Q12H KRISS **FOLLOWED BY** [START ON 11/8/2024] dexamethasone (DECADRON) tablet 2 mg, Daily    diphenhydramine, lidocaine, Al/Mg hydroxide, simethicone (Magic Mouthwash) oral solution 10 mL, Q4H PRN    enoxaparin (LOVENOX) subcutaneous injection 30 mg, Q24H KRISS    gabapentin (NEURONTIN) capsule 100 mg, BID before breakfast/lunch    gabapentin (NEURONTIN) capsule 300 mg, HS    levETIRAcetam (KEPPRA) tablet 1,000 mg, Q12H KRISS    levothyroxine tablet 50 mcg, Early Morning    lidocaine (LIDODERM) 5 % patch 1 patch, Daily    melatonin tablet 6 mg, HS PRN    ondansetron (ZOFRAN) injection 4 mg, Q4H PRN    pantoprazole (PROTONIX) EC tablet 40 mg, Early Morning    senna-docusate sodium (SENOKOT S) 8.6-50  mg per tablet 2 tablet, BID    Facility-Administered Medications Ordered in Other Encounters:     fentaNYL injection, PRN    midazolam (VERSED) injection, PRN  VTE Pharmacologic Prophylaxis: Sequential compression device (Venodyne)  and Enoxaparin (Lovenox)  VTE Mechanical Prophylaxis: sequential compression device    Administrative Statements   I have spent a total time of 45 minutes in caring for this patient on the day of the visit/encounter including Diagnostic results, Prognosis, Patient and family education, Importance of tx compliance, Counseling / Coordination of care, Documenting in the medical record, Reviewing / ordering tests, medicine, procedures  , and Obtaining or reviewing history  .  Portions of the record may have been created with voice recognition software.

## 2024-11-02 NOTE — ASSESSMENT & PLAN NOTE
Lipid panel 7/20/2024: , TG 76, HDL 60,   ASCVD of 18.4%.  Not on any statin therapy per chart review    Plan:  - Hold Lipitor 20 mg daily given starting Paxlovid therapy

## 2024-11-02 NOTE — ASSESSMENT & PLAN NOTE
Sodium 124 on 11/1/24  Euvolemic on exam. TSH 0.54 10/21/24. Hx of adenocarcinoma lung CA with mets to brain. Pain on baseline. Most likely SIADH picture.  No neuro deficits. AAOX3.    Sodium this morning 11/2/24: 138    Plan:  - Continue to monitor   - Will remove fluid restriction of 1.8L  - F/u on serum osmo, urine osmo, and urine sodium

## 2024-11-02 NOTE — DISCHARGE INSTR - AVS FIRST PAGE
Continue to take Paxlovid twice daily until 11/6/24    Do not take Lipitor 20 mg daily until 11/7/24. Restart Lipitor after finishing Paxlovid    Continue to take Decadron 2mg PO twice daily for 5 days from 11/4/24 to 11/7/24    Start Decadron 2mg PO daily on 11/8/24 until next MRI on 12/27     Continue Keppra 1000 mg twice daily    Continue gabapentin 300 mg nightly and 100 mg in the morning and afternoon    Continue amlodipine 5 mg daily    Continue levothyroxine 50 mcg daily    The remainder of your home medications will be found on your AVS      Follow-up with PCP in 5 to 7 days    Follow-up in the neurology epilepsy clinic in 4 to 6 weeks    Follow-up heme-onc.  Appointment scheduled for 11/5/2024    [facility] will follow up with you at your home outpatient

## 2024-11-02 NOTE — ASSESSMENT & PLAN NOTE
WBC 11.75 from 10.0 10/26. WBC has been increasing since admission of 3.0. Patient is on dexamethasone. Remains afebrile no URI and urinary symptoms. Afebrile, VSS. Low suspicions for infection at this time. Currently on steroid taper.    Patient is concerned that she may have COVID given her daughter tested positive for COVID. She does have mild sore throat, dry cough, and rhinorrhea. Denies fever, chills, and myalgias.     Plan:  - Leukocytosis most likely 2/2 steroid and COVID

## 2024-11-02 NOTE — PLAN OF CARE
Problem: PAIN - ADULT  Goal: Verbalizes/displays adequate comfort level or baseline comfort level  Description: Interventions:  - Encourage patient to monitor pain and request assistance  - Assess pain using appropriate pain scale  - Administer analgesics based on type and severity of pain and evaluate response  - Implement non-pharmacological measures as appropriate and evaluate response  - Consider cultural and social influences on pain and pain management  - Notify physician/advanced practitioner if interventions unsuccessful or patient reports new pain  Outcome: Progressing     Problem: INFECTION - ADULT  Goal: Absence or prevention of progression during hospitalization  Description: INTERVENTIONS:  - Assess and monitor for signs and symptoms of infection  - Monitor lab/diagnostic results  - Monitor all insertion sites, i.e. indwelling lines, tubes, and drains  - Monitor endotracheal if appropriate and nasal secretions for changes in amount and color  - [place] appropriate cooling/warming therapies per order  - Administer medications as ordered  - Instruct and encourage patient and family to use good hand hygiene technique  - Identify and instruct in appropriate isolation precautions for identified infection/condition  Outcome: Progressing     Problem: SAFETY ADULT  Goal: Patient will remain free of falls  Description: INTERVENTIONS:  - Educate patient/family on patient safety including physical limitations  - Instruct patient to call for assistance with activity   - Consult OT/PT to assist with strengthening/mobility   - Keep Call bell within reach  - Keep bed low and locked with side rails adjusted as appropriate  - Keep care items and personal belongings within reach  - Initiate and maintain comfort rounds  - Make Fall Risk Sign visible to staff  - Offer Toileting every 2 Hours, in advance of need  - Initiate/Maintain bed alarm  - Apply yellow socks and bracelet for high fall risk patients  - Consider  moving patient to room near nurses station  Outcome: Progressing  Goal: Maintain or return to baseline ADL function  Description: INTERVENTIONS:  -  Assess patient's ability to carry out ADLs; assess patient's baseline for ADL function and identify physical deficits which impact ability to perform ADLs (bathing, care of mouth/teeth, toileting, grooming, dressing, etc.)  - Assess/evaluate cause of self-care deficits   - Assess range of motion  - Assess patient's mobility; develop plan if impaired  - Assess patient's need for assistive devices and provide as appropriate  - Encourage maximum independence but intervene and supervise when necessary  - Involve family in performance of ADLs  - Assess for home care needs following discharge   - Consider OT consult to assist with ADL evaluation and planning for discharge  - Provide patient education as appropriate  Outcome: Progressing  Goal: Maintains/Returns to pre admission functional level  Description: INTERVENTIONS:  - Perform AM-PAC 6 Click Basic Mobility/ Daily Activity assessment daily.  - Set and communicate daily mobility goal to care team and patient/family/caregiver.   - Collaborate with rehabilitation services on mobility goals if consulted  - Perform Range of Motion 3 times a day.  - Reposition patient every 2 hours.  - Dangle patient 3 times a day  - Stand patient 2 times a day  - Ambulate patient 3 times a day  - Out of bed to chair 2 times a day   - Out of bed for meals 3 times a day  - Out of bed for toileting  - Record patient progress and toleration of activity level   Outcome: Progressing     Problem: DISCHARGE PLANNING  Goal: Discharge to home or other facility with appropriate resources  Description: INTERVENTIONS:  - Identify barriers to discharge w/patient and caregiver  - Arrange for needed discharge resources and transportation as appropriate  - Identify discharge learning needs (meds, wound care, etc.)  - Arrange for interpretive services to  assist at discharge as needed  - Refer to Case Management Department for coordinating discharge planning if the patient needs post-hospital services based on physician/advanced practitioner order or complex needs related to functional status, cognitive ability, or social support system  Outcome: Progressing     Problem: Knowledge Deficit  Goal: Patient/family/caregiver demonstrates understanding of disease process, treatment plan, medications, and discharge instructions  Description: Complete learning assessment and assess knowledge base.  Interventions:  - Provide teaching at level of understanding  - Provide teaching via preferred learning methods  Outcome: Progressing     Problem: NEUROSENSORY - ADULT  Goal: Achieves stable or improved neurological status  Description: INTERVENTIONS  - Monitor and report changes in neurological status  - Monitor vital signs such as temperature, blood pressure, glucose, and any other labs ordered   - Initiate measures to prevent increased intracranial pressure  - Monitor for seizure activity and implement precautions if appropriate      Outcome: Progressing  Goal: Remains free of injury related to seizures activity  Description: INTERVENTIONS  - Maintain airway, patient safety  and administer oxygen as ordered  - Monitor patient for seizure activity, document and report duration and description of seizure to physician/advanced practitioner  - If seizure occurs,  ensure patient safety during seizure  - Reorient patient post seizure  - Seizure pads on all 4 side rails  - Instruct patient/family to notify RN of any seizure activity including if an aura is experienced  - Instruct patient/family to call for assistance with activity based on nursing assessment  - Administer anti-seizure medications if ordered    Outcome: Progressing  Goal: Achieves maximal functionality and self care  Description: INTERVENTIONS  - Monitor swallowing and airway patency with patient fatigue and changes in  neurological status  - Encourage and assist patient to increase activity and self care.   - Encourage visually impaired, hearing impaired and aphasic patients to use assistive/communication devices  Outcome: Progressing     Problem: Prexisting or High Potential for Compromised Skin Integrity  Goal: Skin integrity is maintained or improved  Description: INTERVENTIONS:  - Identify patients at risk for skin breakdown  - Assess and monitor skin integrity  - Assess and monitor nutrition and hydration status  - Monitor labs   - Assess for incontinence   - Turn and reposition patient  - Assist with mobility/ambulation  - Relieve pressure over bony prominences  - Avoid friction and shearing  - Provide appropriate hygiene as needed including keeping skin clean and dry  - Evaluate need for skin moisturizer/barrier cream  - Collaborate with interdisciplinary team   - Patient/family teaching  - Consider wound care consult   Outcome: Progressing

## 2024-11-02 NOTE — PLAN OF CARE
Problem: PAIN - ADULT  Goal: Verbalizes/displays adequate comfort level or baseline comfort level  Description: Interventions:  - Encourage patient to monitor pain and request assistance  - Assess pain using appropriate pain scale  - Administer analgesics based on type and severity of pain and evaluate response  - Implement non-pharmacological measures as appropriate and evaluate response  - Consider cultural and social influences on pain and pain management  - Notify physician/advanced practitioner if interventions unsuccessful or patient reports new pain  Outcome: Progressing     Problem: INFECTION - ADULT  Goal: Absence or prevention of progression during hospitalization  Description: INTERVENTIONS:  - Assess and monitor for signs and symptoms of infection  - Monitor lab/diagnostic results  - Monitor all insertion sites, i.e. indwelling lines, tubes, and drains  - Monitor endotracheal if appropriate and nasal secretions for changes in amount and color  - [place] appropriate cooling/warming therapies per order  - Administer medications as ordered  - Instruct and encourage patient and family to use good hand hygiene technique  - Identify and instruct in appropriate isolation precautions for identified infection/condition  Outcome: Progressing     Problem: SAFETY ADULT  Goal: Patient will remain free of falls  Description: INTERVENTIONS:  - Educate patient/family on patient safety including physical limitations  - Instruct patient to call for assistance with activity   - Consult OT/PT to assist with strengthening/mobility   - Keep Call bell within reach  - Keep bed low and locked with side rails adjusted as appropriate  - Keep care items and personal belongings within reach  - Initiate and maintain comfort rounds  - Make Fall Risk Sign visible to staff  - Offer Toileting every 2 Hours, in advance of need  - Initiate/Maintain bed alarm  - Apply yellow socks and bracelet for high fall risk patients  - Consider  moving patient to room near nurses station  Outcome: Progressing  Goal: Maintain or return to baseline ADL function  Description: INTERVENTIONS:  -  Assess patient's ability to carry out ADLs; assess patient's baseline for ADL function and identify physical deficits which impact ability to perform ADLs (bathing, care of mouth/teeth, toileting, grooming, dressing, etc.)  - Assess/evaluate cause of self-care deficits   - Assess range of motion  - Assess patient's mobility; develop plan if impaired  - Assess patient's need for assistive devices and provide as appropriate  - Encourage maximum independence but intervene and supervise when necessary  - Involve family in performance of ADLs  - Assess for home care needs following discharge   - Consider OT consult to assist with ADL evaluation and planning for discharge  - Provide patient education as appropriate  Outcome: Progressing  Goal: Maintains/Returns to pre admission functional level  Description: INTERVENTIONS:  - Perform AM-PAC 6 Click Basic Mobility/ Daily Activity assessment daily.  - Set and communicate daily mobility goal to care team and patient/family/caregiver.   - Collaborate with rehabilitation services on mobility goals if consulted  - Perform Range of Motion 3 times a day.  - Reposition patient every 2 hours.  - Dangle patient 3 times a day  - Stand patient 2 times a day  - Ambulate patient 3 times a day  - Out of bed to chair 2 times a day   - Out of bed for meals 3 times a day  - Out of bed for toileting  - Record patient progress and toleration of activity level   Outcome: Progressing     Problem: DISCHARGE PLANNING  Goal: Discharge to home or other facility with appropriate resources  Description: INTERVENTIONS:  - Identify barriers to discharge w/patient and caregiver  - Arrange for needed discharge resources and transportation as appropriate  - Identify discharge learning needs (meds, wound care, etc.)  - Arrange for interpretive services to  assist at discharge as needed  - Refer to Case Management Department for coordinating discharge planning if the patient needs post-hospital services based on physician/advanced practitioner order or complex needs related to functional status, cognitive ability, or social support system  Outcome: Progressing     Problem: Knowledge Deficit  Goal: Patient/family/caregiver demonstrates understanding of disease process, treatment plan, medications, and discharge instructions  Description: Complete learning assessment and assess knowledge base.  Interventions:  - Provide teaching at level of understanding  - Provide teaching via preferred learning methods  Outcome: Progressing     Problem: NEUROSENSORY - ADULT  Goal: Achieves stable or improved neurological status  Description: INTERVENTIONS  - Monitor and report changes in neurological status  - Monitor vital signs such as temperature, blood pressure, glucose, and any other labs ordered   - Initiate measures to prevent increased intracranial pressure  - Monitor for seizure activity and implement precautions if appropriate      Outcome: Progressing  Goal: Remains free of injury related to seizures activity  Description: INTERVENTIONS  - Maintain airway, patient safety  and administer oxygen as ordered  - Monitor patient for seizure activity, document and report duration and description of seizure to physician/advanced practitioner  - If seizure occurs,  ensure patient safety during seizure  - Reorient patient post seizure  - Seizure pads on all 4 side rails  - Instruct patient/family to notify RN of any seizure activity including if an aura is experienced  - Instruct patient/family to call for assistance with activity based on nursing assessment  - Administer anti-seizure medications if ordered    Outcome: Progressing  Goal: Achieves maximal functionality and self care  Description: INTERVENTIONS  - Monitor swallowing and airway patency with patient fatigue and changes in  neurological status  - Encourage and assist patient to increase activity and self care.   - Encourage visually impaired, hearing impaired and aphasic patients to use assistive/communication devices  Outcome: Progressing     Problem: Prexisting or High Potential for Compromised Skin Integrity  Goal: Skin integrity is maintained or improved  Description: INTERVENTIONS:  - Identify patients at risk for skin breakdown  - Assess and monitor skin integrity  - Assess and monitor nutrition and hydration status  - Monitor labs   - Assess for incontinence   - Turn and reposition patient  - Assist with mobility/ambulation  - Relieve pressure over bony prominences  - Avoid friction and shearing  - Provide appropriate hygiene as needed including keeping skin clean and dry  - Evaluate need for skin moisturizer/barrier cream  - Collaborate with interdisciplinary team   - Patient/family teaching  - Consider wound care consult   Outcome: Progressing

## 2024-11-02 NOTE — ASSESSMENT & PLAN NOTE
Patient with history of non-small cell lung adenocarcinoma diagnosed in March 2024.  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  She completed SRS for these metastatic lesions in August 2024.  She is on Decadron in the outpatient setting and has had multiple admissions due to episodes of altered mental status with increased vasogenic edema after attempting to taper Decadron dosing.  She presented to [facility] on 10/20 with worsening confusion and a stroke alert was called.  Imaging showed no acute findings.  She was loaded with Keppra, started on Decadron, and transferred to [facility] for video EEG and MRI brain under neurocritical care for close monitoring.  The morning of 10/21 patient's mental status reportedly much improved and she was deemed stable for transfer out of the ICU.    Symptoms likely in the setting of Decadron taper    Of note, patient is not exactly sure what her current Decadron dosing is.  With that said, it appears that she had been on a Decadron taper throughout September starting at 8 mg daily that was reduced each week.  Her most recent orders show Decadron 1 mg daily from 10/17-10/24 and then 0.5 mg daily for 8 more days    vEEG update as of today: 26 hours of recording. No seizures. Continues to have left hemisphere slowing, left posterior quadrant maximal. There are also left posterior temporal / parietal poorly formed (blunt and at times sharply contoured) LPDs occurring at 0.5-1 Hz. LPDs indicate high seizure risk and are often associated with acute/subacute destructive neuronal injury. Right hemisphere is essentially normal.     Blood cultures no growth after 5 days    Plan:  - MRI 10/21 show stable vasogenic edema in the left parietotemporal region; previously demonstrated left posterior parietal lesion as well visualized likely due to differences in technique.  No new metastasis, no new acute infarct or hemorrhage.  -  Neurosurgery, neurology, and palliative care consulted.  Appreciate assistance  - Per neurosurgery, no emergent neurosurgical intervention warranted  - Per neurology, continue Keppra 100 mg BID , okay to discharge with this dosage.  Also recommended Plavix or aspirin given right monocular inferior temporal vision for impairment at high risk of developing CV A. Follow-up with epilepsy clinic in 4 to 6 weeks  - Monitor on video EEG  - Reached out to Dr. Gamboa, Rad-Onc on 10/25 and recommended slower taper:  Decadron 4mg PO twice daily for 5 days from 10/29/24 to 11/2/24  Decadron 2mg PO twice daily for 5 days from 11/3/24 to 11/7/24  Decadron 2mg PO daily until her next MRI on 12/27 (she can try to self taper to 1mg during this interval if she wishes)   - PPI for gastric prophylaxis in setting of prolonged Decadron use; patient taking Decadron chronically prior to admission.  - Continue monitoring glucose while on dexamethasone therapy  - SBP goal <160  - Stat CT head with any neurological decline for return of altered mental status  - Seizure precautions in place  - Per Neuropsych, patient has the capacity to make informed medical decisions  - Per CM, SNF denied, tentative plan home with home health on Monday

## 2024-11-02 NOTE — ASSESSMENT & PLAN NOTE
Patient has been having sore throat, dry cough in the morning, and rhinorrhea. Patient's daughter tested positive for COVID-19..   COVID test came back positive.  Patient is afebrile and sating well on RA >90%, without need of O2.   Given age and history of cancer, we will treat.    Plan  - Paxlovid for 5 days (D1/5)  - Continue to monitor O2 need

## 2024-11-03 LAB
ANION GAP SERPL CALCULATED.3IONS-SCNC: 12 MMOL/L (ref 4–13)
BUN SERPL-MCNC: 25 MG/DL (ref 5–25)
CALCIUM SERPL-MCNC: 8.4 MG/DL (ref 8.4–10.2)
CHLORIDE SERPL-SCNC: 104 MMOL/L (ref 96–108)
CO2 SERPL-SCNC: 24 MMOL/L (ref 21–32)
CREAT SERPL-MCNC: 0.77 MG/DL (ref 0.6–1.3)
ERYTHROCYTE [DISTWIDTH] IN BLOOD BY AUTOMATED COUNT: 15.9 % (ref 11.6–15.1)
GFR SERPL CREATININE-BSD FRML MDRD: 76 ML/MIN/1.73SQ M
GLUCOSE SERPL-MCNC: 150 MG/DL (ref 65–140)
GLUCOSE SERPL-MCNC: 224 MG/DL (ref 65–140)
GLUCOSE SERPL-MCNC: 303 MG/DL (ref 65–140)
HCT VFR BLD AUTO: 33.6 % (ref 34.8–46.1)
HGB BLD-MCNC: 10.6 G/DL (ref 11.5–15.4)
MCH RBC QN AUTO: 33.1 PG (ref 26.8–34.3)
MCHC RBC AUTO-ENTMCNC: 31.5 G/DL (ref 31.4–37.4)
MCV RBC AUTO: 105 FL (ref 82–98)
PLATELET # BLD AUTO: 233 THOUSANDS/UL (ref 149–390)
PMV BLD AUTO: 9.4 FL (ref 8.9–12.7)
POTASSIUM SERPL-SCNC: 4.3 MMOL/L (ref 3.5–5.3)
RBC # BLD AUTO: 3.2 MILLION/UL (ref 3.81–5.12)
SODIUM SERPL-SCNC: 140 MMOL/L (ref 135–147)
WBC # BLD AUTO: 10.44 THOUSAND/UL (ref 4.31–10.16)

## 2024-11-03 PROCEDURE — 85027 COMPLETE CBC AUTOMATED: CPT

## 2024-11-03 PROCEDURE — 82948 REAGENT STRIP/BLOOD GLUCOSE: CPT

## 2024-11-03 PROCEDURE — 99232 SBSQ HOSP IP/OBS MODERATE 35: CPT | Performed by: INTERNAL MEDICINE

## 2024-11-03 PROCEDURE — 80048 BASIC METABOLIC PNL TOTAL CA: CPT

## 2024-11-03 RX ORDER — INSULIN LISPRO 100 [IU]/ML
1-5 INJECTION, SOLUTION INTRAVENOUS; SUBCUTANEOUS
Status: DISCONTINUED | OUTPATIENT
Start: 2024-11-03 | End: 2024-11-04 | Stop reason: HOSPADM

## 2024-11-03 RX ORDER — DEXAMETHASONE 2 MG/1
2 TABLET ORAL EVERY 12 HOURS SCHEDULED
Status: DISCONTINUED | OUTPATIENT
Start: 2024-11-04 | End: 2024-11-04 | Stop reason: HOSPADM

## 2024-11-03 RX ORDER — DEXAMETHASONE 2 MG/1
2 TABLET ORAL DAILY
Status: DISCONTINUED | OUTPATIENT
Start: 2024-11-08 | End: 2024-11-04 | Stop reason: HOSPADM

## 2024-11-03 RX ADMIN — NIRMATRELVIR AND RITONAVIR 3 TABLET: KIT at 17:23

## 2024-11-03 RX ADMIN — INSULIN LISPRO 1 UNITS: 100 INJECTION, SOLUTION INTRAVENOUS; SUBCUTANEOUS at 11:18

## 2024-11-03 RX ADMIN — ENOXAPARIN SODIUM 30 MG: 30 INJECTION SUBCUTANEOUS at 09:08

## 2024-11-03 RX ADMIN — GABAPENTIN 100 MG: 100 CAPSULE ORAL at 11:17

## 2024-11-03 RX ADMIN — AMLODIPINE BESYLATE 5 MG: 5 TABLET ORAL at 09:07

## 2024-11-03 RX ADMIN — DEXAMETHASONE 2 MG: 2 TABLET ORAL at 21:20

## 2024-11-03 RX ADMIN — ACETAMINOPHEN 650 MG: 325 TABLET, FILM COATED ORAL at 17:18

## 2024-11-03 RX ADMIN — Medication 6 MG: at 23:52

## 2024-11-03 RX ADMIN — SENNOSIDES AND DOCUSATE SODIUM 2 TABLET: 50; 8.6 TABLET ORAL at 09:06

## 2024-11-03 RX ADMIN — NIRMATRELVIR AND RITONAVIR 3 TABLET: KIT at 09:10

## 2024-11-03 RX ADMIN — SENNOSIDES AND DOCUSATE SODIUM 2 TABLET: 50; 8.6 TABLET ORAL at 17:18

## 2024-11-03 RX ADMIN — LEVOTHYROXINE SODIUM 50 MCG: 50 TABLET ORAL at 05:50

## 2024-11-03 RX ADMIN — LEVETIRACETAM 1000 MG: 500 TABLET, FILM COATED ORAL at 09:06

## 2024-11-03 RX ADMIN — ACETAMINOPHEN 650 MG: 325 TABLET, FILM COATED ORAL at 11:17

## 2024-11-03 RX ADMIN — GABAPENTIN 100 MG: 100 CAPSULE ORAL at 05:49

## 2024-11-03 RX ADMIN — ACETAMINOPHEN 650 MG: 325 TABLET, FILM COATED ORAL at 23:52

## 2024-11-03 RX ADMIN — LEVETIRACETAM 1000 MG: 500 TABLET, FILM COATED ORAL at 21:20

## 2024-11-03 RX ADMIN — INSULIN LISPRO 3 UNITS: 100 INJECTION, SOLUTION INTRAVENOUS; SUBCUTANEOUS at 17:23

## 2024-11-03 RX ADMIN — ACETAMINOPHEN 650 MG: 325 TABLET, FILM COATED ORAL at 05:50

## 2024-11-03 RX ADMIN — GABAPENTIN 300 MG: 300 CAPSULE ORAL at 21:20

## 2024-11-03 RX ADMIN — PANTOPRAZOLE SODIUM 40 MG: 40 TABLET, DELAYED RELEASE ORAL at 05:50

## 2024-11-03 RX ADMIN — DEXAMETHASONE 2 MG: 2 TABLET ORAL at 05:50

## 2024-11-03 NOTE — PROGRESS NOTES
HEMATOLOGY / ONCOLOGY CLINIC FOLLOW UP NOTE    Patient Ayla Nye  MRN: [id number]  : 1950  Date of Encounter 2024        Reason for Encounter: hospital follow up, pathology follow up for pelvic mass biopsy     C1 2024  C2  2024  C3 2024  C4 2024  C5 2024  C6 2024 at [facility] f/b hospitalization for neutropenic fever     First line metastatic treatment     Carboplatin AUC 5 Pemetrexed 500 mg/m2 and Pembrolizumab 200 mg IV every 3 weeks x 4 cycles     C1 held due to hospital admission  C1 held due to radiation and concurrent high-dose steroid use     C1 now 10/7/2024  C2 10/28/2024-held due to admission    C2 will be 2024        Oncology History Overview Note   Patient has history of [id number] (IIIB) NSCLC of the right upper lobe, s/p concurrent chemoRT completing on 24. She completed a course of SRS on 24 after MRI brain demonstrated five supra- and infratentorial enhancing lesions compatible with metastases. Currently receiving a course of palliative radiation therapy to right pelvic mass. She returns today for 2 month follow-up with repeat MRI.    10/8/24 MRI Brain BT w wo Contrast  IMPRESSION:  Mixed treatment response since MRI brain 9/10/2024  - Slightly increase size of left temporal lobe metastatic lesion.  - Unchanged left occipital lobe metastatic lesion with evidence of radiation necrosis, left paramedian parietal lobe lesion, and tiny left anterolateral lobe frontal lobe lesion.  - Unchanged enhancement in bilateral cranial nerve VII and both internal auditory canals, suspicious for leptomeningeal metastasis.  - Decreased size of left paramedian frontal lobe lesion and tiny cerebellar vermis lesion.  - No new enhancing intracranial metastasis.  Persistent diffuse perilesional vasogenic edema in left parietal lobe and left occipital lobe with mild mass effect on posterior aspect of left lateral ventricle.    Upcoming:       Malignant  neoplasm of upper lobe, right bronchus or lung (HCC)   2024 Initial Diagnosis    Primary lung adenocarcinoma, right (HCC)     3/26/2024 Biopsy    A-C. Lymph Node, Level 4R :    - Metastatic non-small cell carcinoma, most compatible with a primary lung adenocarcinoma; see note.       D-F. Lymph Node, Level 10R:    - Metastatic non-small cell carcinoma; see note.       G-I. Lymph Node, Level 4L:    - Metastatic non-small cell carcinoma; see note.       4/15/2024 -  Cancer Staged    Staging form: Lung, AJCC 8th Edition  - Clinical stage from 4/15/2024: Stage IIIB (cT2b, cN3, cM0) - Signed by Rogelio Beebe DO on 4/15/2024       5/23/2024 - 7/2/2024 Chemotherapy    alteplase (CATHFLO), 2 mg, Intracatheter, Every 1 Minute as needed, 6 of 6 cycles  CARBOplatin (PARAPLATIN) IVPB (GO AUC DOSING), 130.4 mg, Intravenous, Once, 6 of 6 cycles  Administration: 130.4 mg (5/23/2024), 144.8 mg (5/30/2024), 138.4 mg (6/6/2024), 144.8 mg (6/13/2024), 132 mg (6/21/2024), 143.6 mg (7/2/2024)  PACLItaxel (TAXOL) chemo IVPB, 45 mg/m2 = 67.2 mg (90 % of original dose 50 mg/m2), Intravenous, Once, 6 of 6 cycles  Dose modification: 45 mg/m2 (original dose 50 mg/m2, Cycle 1, Reason: Anticipated Tolerance)  Administration: 67.2 mg (5/23/2024), 67.2 mg (5/30/2024), 67.2 mg (6/6/2024), 67.2 mg (6/13/2024), 67.2 mg (6/21/2024), 67.2 mg (7/2/2024)     5/23/2024 - 7/5/2024 Radiation    Treatment:  Course: C1    Plan ID Energy Fractions Dose per Fraction (cGy) Dose Correction (cGy) Total Dose Delivered (cGy) Elapsed Days   R Lung_Hilum 6X 30 / 30 200 0 6,000 43      Treatment dates:  C1: 5/23/2024 - 7/5/2024 8/8/2024 - 8/8/2024 Radiation    SRS 5 PTVs   2000 cGy     9/12/2024 Biopsy    Mass, Superficial perianal mass:  - Squamous cell carcinoma.     Note: The patient's prior lung EBUS sampling shows the tumor to stain for TTF1 and Napsin with absent p40 expression.  The current perianal mass sampling shows diffuse p40 expression with  absent TTF1 expression.  This may represent a primary anal/perianal squamous cell carcinoma versus a possible metastatic combined lung tumor (adenocarcinoma and previously unsampled squamous component).  Suggest clinical correlation and appropriate follow-up.         9/23/2024 -  Cancer Staged    Staging form: Lung, AJCC 8th Edition  - Clinical: Stage IV (cM1) - Signed by Honey Gamboa MD on 9/23/2024       10/7/2024 -  Chemotherapy    cyanocobalamin, 1,000 mcg, Intramuscular, Once, 1 of 3 cycles  Administration: 1,000 mcg (8/19/2024)  alteplase (CATHFLO), 2 mg, Intracatheter, Every 1 Minute as needed, 1 of 6 cycles  fosaprepitant (EMEND) IVPB, 150 mg, Intravenous, Once, 1 of 6 cycles  Administration: 150 mg (10/7/2024)  CARBOplatin (PARAPLATIN) IVPB (GOG AUC DOSING), 347 mg, Intravenous, Once, 1 of 6 cycles  Administration: 347 mg (10/7/2024)  pemetrexed (ALIMTA) chemo infusion, 735 mg, Intravenous, Once, 1 of 6 cycles  Administration: 700 mg (10/7/2024)  pembrolizumab (KEYTRUDA) IVPB, 200 mg, Intravenous, Once, 1 of 6 cycles  Administration: 200 mg (10/7/2024)     Metastatic cancer to brain (HCC)   7/29/2024 Initial Diagnosis    Metastatic cancer to brain (HCC)     8/8/2024 - 8/8/2024 Radiation    SRS 5 PTVs   2000 cGy     9/12/2024 Biopsy    Mass, Superficial perianal mass:  - Squamous cell carcinoma.     Note: The patient's prior lung EBUS sampling shows the tumor to stain for TTF1 and Napsin with absent p40 expression.  The current perianal mass sampling shows diffuse p40 expression with absent TTF1 expression.  This may represent a primary anal/perianal squamous cell carcinoma versus a possible metastatic combined lung tumor (adenocarcinoma and previously unsampled squamous component).  Suggest clinical correlation and appropriate follow-up.                Assessment/Plan:     Ms. 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  She has  "been tolerating Hydrea 500 mg once daily Monday through Friday and off on Saturday and Sunday. Patient also with 4.1 cm RUL mass 1.5cm spiculated posterior nodule mediastinal adenopathy, no metastatic disease including brain MRI new diagnosis      ET:      Previously treated with hydroxyurea (HYDREA) 500 mg capsule     Plts 498 > 434 > 388 (holding hydroxyurea since 5/23/2024 2/2 drop in platelets from chemotherapy)     Plts 166 (7/4/2024) s/p C6 chemo, continuing to hold hydroxyurea, recheck labs and will plan to restart once labs show thrombocytosis.     Does not need to be restarted on hydrea; labs from 8/1/2024 were 192      Plts 8/24/2024 were 227      NSCLC/adenocarcinoma; mutational analysis pending      Now with RUL 4.2 cm mass with mediastinal adenopathy at least cT2a cN2 cM0 lung cancer     cT2b N3 Stage IIIB lung      Please see above regarding discussion      Carboplatin AUC 2 Taxol 45 mg/m2 weekly with RT     Durvalumab adjuvant post treatment depending on response     Neuropathy has resolved after last cycle of Taxol.     Esophagitis appearing better after completing radiation.     7/1/2024     Was admitted 6/22-6/24/2024 for neutropenic fever     WBC 2.5 ANC 1810 Hgb 9.0 plts 240 (hx ET/off hydrea)     Continue with C6 7/2/2024; last radiation 7/5/2024       7/15/2024     Completed C6 on 7/2/2024 and last radiation 7/5/2024.  Was admitted for neutropenic fever after last cycle of chemo.  Discharged on 7/4/2024 and has been asymptomatic since.  Labs at discharge showed WBC 1.5, ANC 1317 and Plt 166.     Repeat CT PET 8/26/2024, radiology read from CT CAP in ED showed \"progression\" of disease but this study was performed during chemoradiation, so it is confounded by the inability to distinguish disease from inflammation.  Will  treatment response on repeat CT PET.     Recheck labs to confirm neutropenia has resolved.     Hold Otezla for psoriasis until neutropenia has resolved, but would like to " restart as soon as safe as psoriasis is rebounding.     8/16/2024     Was admitted for new onset brain mets 7/30/2024; on decadron taper down to 1 mg daily next week     S/p SRS 6 fractions completed 8 August 2024 at [facility]     Had modest response to treatment per PET in terms of the lung     At issue clearly are the new brain lesions 2.0x.4 left occiput as well as multiple other smaller lesions as below     In addition PET with new SUV 9.9 mass in the right anorectal fat/right ischial tuberosity 2.7x2 which is painful to patient and feels is growing     Will get MRI pelvis; will probably need biopsy.  Less likely abscess and more likely disease but unusual place for a NSCLC to metastasize to     Will start Carboplatin/Pemetrexed/Pembrolizumab and had long discussion regarding this         9/17/2024     Biopsy periranal area as follows:         . Mass, Superficial perianal mass:  - Squamous cell carcinoma.     Note: The patient's prior lung EBUS sampling shows the tumor to stain for TTF1 and Napsin with absent p40 expression.  The current perianal mass sampling shows diffuse p40 expression with absent TTF1 expression.  This may represent a primary anal/perianal squamous cell carcinoma versus a possible metastatic combined lung tumor (adenocarcinoma and previously unsampled squamous component).  Suggest clinical correlation and appropriate follow-up.      Immunohistochemistry was performed on block A3. The tumor cells are positive for p40, AE1/3, CK7, GATA3; MOC-13 shows nonspecific staining and negative for CDX2, p16, CD45, CD31, synaptophysin, chromogranin, mucicarmine, p53 (wild-type), supporting the above diagnosis.  Napsin A is pending and results will be reported in an addendum.      Will have Rad Onc see patient as pain is unmanageable and will not use narcotics     Is seeing Palliative Care     Will add celebrex 200 mg bid for now as taking extensive Tylenol with no pain relief     Was taking oxycodone, did  not help and does not want dilaudid as lives alone      In terms of her metastatic disease, she was being treated for adenocarcinoma and this pathology is SCC; NSCLC is adenocarcinoma and thus this may be new primary lesion     May consider Carboplatin with taxane     Cannot start IO therapy as decadron dose remains above 20 mg prednisone equivalent and attempts at tapering have led to worsening neurological symptoms        9/23/2024     Pain improved with Celebrex/Tylenol      Did see Rad Onc; planning 15 fractions to right gluteal area with path c/w SCC     Markers for lung adenocarcinoma as was [facility]     This occurred also on Carboplatin/Taxol and CRT     Thus, will treat as planned with Carboplatin/Pemetrexed/Pembrolizumab     Decadron 2 mg bid; plan to 2 mg daily next week.     Can remain on that as equivalent to 10 mg prednisone     Plan for chemo at [facility] Oct 2024      10/9/2024     Continues with rectal pain; was started on RT for SCC/new primary in right muscle-has had 6/15 fractions     Was on decadron taper to 2 mg daily which allows IO therapy to work as not effective at doses above 10 mg prednisone/2 mg decadron ; Rad Onc increased to 4 mg decadron for pain control but probably compromised efficacy of Pembrolizumab      Seeing Dr Neff tomorrow      Did tolerate C1 of Carboplatin/Pemetrexed/Pembrolizumab with no side effects       11/5/2024    Again admitted for HA/dizziness off decadron; discharged 11/4/2024; also found to be COVID positive, on paxlovid at present     Restarted 2 mg daily decadron as cannot tolerate taper    EEG done/ questionable seizure activity, on keppra    Scans repeated and with only 1 cycle of chemo which is not telling results as follows:    CT CAP 10/22/2024    1. New small bilateral pulmonary nodules, likely metastases.     2. New right renal lesion(s) concerning for metastasis.     3. Significant interval enlargement of necrotic appearing mass in the right ischiorectal  fossa, likely metastasis.     4. Decreasing right upper lobe necrotic mass with stable and/or decreasing satellite nodularity.     5. Improved mediastinal and right hilar adenopathy.     6. Stable 2.4 cm left upper lobe groundglass density.      MRI brain 10/21/2024      Redemonstration of intracranial metastatic lesions  2.3 cm enhancing lesion at the left occipital lobe is slightly increased in maximum dimension with increased central necrosis at the anterior margin that may be partly due to radiation necrosis.  Additional smaller metastases are stable or mildly decreased in size.  Stable left parieto-occipital edema with mass effect on the left lateral ventricle.  No new metastases. No acute infarct or hemorrhage    Will remain on decadron and will attempt C2 now on 11/14/2024         Hypothyroid     TSH 16.3 free T4 0.77 prior to any IO therapy     Will start 50 mcg daily synthroid, adjust in 6 weeks as needed      TSH 9/13/2024 0.403 may need to adjust to 25 mcg daily-will assess repeat labs      10/7/2024 TSH 2.7 Free T3 2.5 so no change     TSH/free T4 will be repeated prior to C2     ET     Plts off hydrea 220     Plts 102 11/4/2024      Follow up     25 Nov          History of present illness:  Initial Visit 4/10/2024     Ayla Nye is a 73-year-old female with past medical history of hypertension, psoriatic arthritis, CKD stage III, hyperlipidemia, and latent TB.  She is seen in follow-up for essential thrombocytosis.  She has had an elevated platelet count dating back to January 2028 all of her other cell lines were within normal limits.  Her highest platelet count was around 700,000.  Myeloproliferative work-up demonstrated positive JAK2 mutation and she was started on 500 mg of Hydrea daily in July 2020.  Due to leukopenia her dose was reduced to Monday, Wednesday, Friday.  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     In March 2023 her  dose was increased back to once daily due to increased platelet count and no longer working at the school.  Then again in July we had to decrease her dose and she is taking 500 mg once daily Monday through Friday and not taking any on Saturday or Sunday.     She has been tolerating the 500 mg of Hydrea Monday through Friday off Saturday and Sunday well without any side effects.  She was seen by my attending and underwent further workup of her recurrent infections and was found to have a mildly low IgG level.   She has had pneumonia twice once in October and then again in February.  She states that she has had multiple imaging of her chest which does suggest scarring which prompted a CT scan of her chest .  .  She is also had recurrent sinus infections.  She is a former smoker and quit 15 years ago bit was 1[[d x more than 30 years.        She does not have any continued symptoms of pneumonia and no cough, shortness of breath, or fevers.       She underwent the following testing; EBUS with pulmonary as well as CT chest/PET scan     Lung mass on CT chest 2/28/2024     LUNGS:     -4.1 x 3.4 cm solid mass in the anterior right upper lobe (series 302, image 73).     -Posterior to this mass, there is a 1.5 x 1.5 cm spiculated nodule (series 302, image 78)     -2.5 x 2.1 cm groundglass nodule in the anterior left upper lobe (series 302, image 90)     Mild emphysema.     Right apical pleural-parenchymal scarring.     PLEURA: Small calcified left posterior pleural plaque, which may be from prior asbestos exposure or the sequela of old inflammation.     HEART/GREAT VESSELS: Normal heart size. Mild-moderate coronary artery calcification. Mild to moderate mitral annular calcification. Trace pericardial effusion. No thoracic aortic aneurysm.     MEDIASTINUM AND SUDEEP: 1.6 x 2.0 cm right lower paratracheal/precarinal lymph node. 1.3 x 1.3 cm right lower paratracheal lymph node.        CT PET  3/28/2024     Intensely FDG avid right  upper lobe mass, SUV max of 18. This abuts the anterior pleural margin. Central photopenia suggest necrosis. Mass measures on the order of 4.2 cm in size, stable previously 4.1 cm.     Subjacent 1.5 cm spiculated nodule posteriorly demonstrates minimal uptake, SUV max of 1.7.     Minimal FDG uptake in the left upper lobe groundglass nodule, SUV max of 2.2. This measured up to 2.5 cm in size on recent CT, appears grossly stable on low-dose CT.     A few FDG-avid mediastinal lymph nodes. Right anterior paratracheal lymph node demonstrates SUV max of 13. This measures up to 1.9 cm short axis image 85 series 3, may be slightly larger previously 1.6 cm.     A right perihilar lymph node demonstrates SUV max of 12. This measures on the order of 1.2 cm short axis image 89 series 3.  CT images: Scattered emphysematous changes of the lung fields. Mild to moderate coronary artery calcifications. Minimal left pleural calcification. Small calcified lymph nodes in the left perihilar region        3/26/2024     -C. Lymph Node, Level 4R (ThinPrep, smears and cell block preparations):    - Metastatic non-small cell carcinoma, most compatible with a primary lung adenocarcinoma; see note.    - Satisfactory for evaluation.     D-F. Lymph Node, Level 10R (ThinPrep, smears and cell block preparations):    - Metastatic non-small cell carcinoma; see note.    - Satisfactory for evaluation.     G-I. Lymph Node, Level 4L (ThinPrep, smears and cell block preparations):    - Metastatic non-small cell carcinoma; see note.    - Satisfactory for evaluation.      7/3/2024    CT CHEST, ABDOMEN AND PELVIS WITH IV CONTRAST     INDICATION: hx of lung cancer, fever, cough. Fever (102.9 degrees Fahrenheit), cough, recently had chemotherapy.     COMPARISON: February 28, 2024     TECHNIQUE: CT examination of the chest, abdomen and pelvis was performed. Multiplanar 2D reformatted images were created from the source data.     This examination, like all CT  scans performed in the [facility], was performed utilizing techniques to minimize radiation dose exposure, including the use of iterative reconstruction and automated exposure control. Radiation dose length   product (DLP) for this visit: 314 mGy-cm     IV Contrast: 100 mL of iohexol (OMNIPAQUE)  Enteric Contrast: Not administered.     FINDINGS:     CHEST     LUNGS: There is an approximately 4.6 x 4.8 x 3.3 cm mass within the anteromedial aspect of the right upper lobe of the lung. This mass abuts the anterior and anteromedial pleural surface and the right hand aspect of the upper mediastinum. The mass   demonstrates peripheral spiculation and areas of low density centrally, suggesting areas of necrosis. Just posterolateral to this mass there is a spiculated satellite lesion measuring approximately 1.6 cm. There is also a spiculated satellite lesion   anterior and inferior to the dominant mass, measuring approximately 1.4 cm and abutting the anterior pleural surface (series 302 image 91); this satellite mass appears new compared with February 28, 2024. An ill-defined groundglass opacity in the left   upper lobe of the lung measures approximately 2.5 cm, similar to the prior study.     There is mild to moderate emphysema. There is mild bibasilar scarring versus atelectasis. Right apical scarring unchanged.     PLEURA: Left pleural calcifications unchanged. No pleural effusions. No pneumothorax.     HEART/GREAT VESSELS: Coronary artery disease. There is atherosclerosis of the thoracic aorta. No thoracic aortic aneurysm.     MEDIASTINUM AND SUDEEP: There is a 2.4 x 2.2 cm right paratracheal node demonstrating low density centrally suggesting necrosis; this is larger compared with February 28, 2024. Additionally, there is a 2.8 x 2 cm precarinal node demonstrating low density   centrally suggesting necrosis, also larger compared to the prior study. Necrotic appearing right hilar adenopathy measuring up to  2 cm.     CHEST WALL AND LOWER NECK: Tiny thyroid nodules, measuring 6 mm or less. Incidental discovery of one or more thyroid nodule(s) measuring less than 1.5 cm and without suspicious features is noted in this patient who is above 35 years old; according to   guidelines published in the February 2015 white paper on incidental thyroid nodules in the Journal of the American College of Radiology (JACR), no further evaluation is recommended.     AInterval History:  6/3/2024     Ms Nye is tolerating treatment fairly well but did have a reaction to Taxol at C2.  She has flushing and chest pain; drug was stopped she was given Benadryl/steroids/fluids and started at slower rate.  She has not had issues with N/V but with constipation.  Her joints are more painful.  She is eating but taste is an issue, probably due to Carboplatin.  She denies any GERD but has increased cough which is from CRT.  She has no SOB.  We discussed Mucinex to help cut the phlegm generated from treatment.  Her weight is 110 pounds which is slightly decreased; her BP is 118/64. She has no other issues          Interval History:  6/17/2024     Doing well; in week 5.  Has esophagitis and carafate slurry  was prescribed; she has not started it.  No N/V, has minimal numbness mostly feet.  Had issue with veins and extravasation fluid left lower arm.  Some reddness, healing, no infection.  Labs ok plts 388 WBC 5.9 Hgb 9.4.  States psoriasis is better.  Her weight is decreased to 104 but she is eating but less.       Interval History:  7/1/2024 6/22/2024 admitted due to neutropenic  fever, rigors.  She also endorsed fatigue which was post chemo.  In the ED, she was found to be fulfilling the SIRS criteria given her low white count, she was empirically started on broad-spectrum antibiotic.  On the following day antibiotics were stopped as she continued to improve.  During her hospitalization, she also developed diarrhea which was to be  "noninfective in origin.  She also received her scheduled radiation therapy  Discharged 6/24/2024; chemotherapy was held for neutropenia     She is doing better today; very anxious to stop treatment this week.  Feeling improved from admission.      Labs improved as above WBC 2.5 ANC 1810 plts 240 Na 141 k 4/1 Cr 0.85 ALT/AST 8/10      Interval History:  7/15/2024   Completed C6 on 7/2/2024 at [facility] and completed radiation on 7/5/2024.  Was admitted at [facility] for neutropenic fever after last cycle of chemo.  Discharged on 7/4/2024 and has been asymptomatic since.  CT CAP showed radiology read of \"progression\" of disease, but this scan was taken during chemoradiation and cannot distinguish between disease and inflammation.  Labs at discharge showed WBC 1.5, ANC 1317 and Plt 166.  Denied fever/chills, night sweats, adenopathy, or weight loss.  Endorsed worsening of psoriasis.  Neuropathy has resolved after last cycle of chemo.  Continues off hydroxyurea 2/2 normal platelet counts and continues off Otezla for psoriasis.            Interval History:  8/16/2024     Patient completed CRT in early July 2024, was admitted for NF and on that scan there appeared to be increase in the RUL mass but again had just finished concurrent treatment with significant inflammation     On 30th July , patient early in the day was well, mowing grass and then noted loss of balance, disorientation, confusion.  She also had vision \"flashes\" in the right field.  She was seen at [facility] as below:     Admission 7/30/2024    status post concurrent weekly Taxol/carboplatin with radiation therapy on May 2024 and finished on 7/5/2024, the patient had change in mental status, imbalance, nausea admitted to the hospital on 7/29/2024, MRI of the brain showed multiple supratentorial and infratentorial enhancing lesion associated with vasogenic edema the largest lesion in the left occipital lobe measuring 2 x 1.4 cm with adjacent " edema    Treatment options from a radiation therapy standpoint include SRS/SRT or whole brain radiation ± hippocampal avoidance. Given the small size and relatively small number of metastases, SRS would be the preferred treatment approach. I discussed with Dr. Norwood with consensus for SRS.     Possible short- and long-term side effects inclide but are not limited to, fatigue, headache, nausea, alopecia, vomiting, injury to the cranial nerves and/or visual structures including the optic nerve and chiasm, a low risk of neurocognitive effects, neurologic deficits, radionecrosis which may require treatment with steroids or surgical resection, hearing loss, stroke-like symptoms, and a low risk of radiation-induced secondary malignancy. Even with appropriate treatment, there is a risk of progression.  One some occasions, additional/other tumors may develop requiring re-evaluation.        MRI Brain 7/30/2024     1. Multiple supratentorial and infratentorial enhancing lesions with associated vasogenic edema, the largest of which is in the left occipital lobe. Findings are most consistent with metastases.     2. No acute infarct or midline shift.     She had the PET scan done after discharge as below:        PET scan 8/5/2024     Persistent hypermetabolic medial right upper lobe lung mass measuring 3.3 x 2.7 cm on image 132 of series 3 with max SUV of 10.0, previously measuring 3.7 x 2.9 cm with max SUV of 18.0 when utilizing similar measurement technique.     On image 132 of series 3 there is a stable adjacent 7 mm right upper lobe lung nodule with max SUV of 2.1, previously similar in size with max SUV of 1.7     Improving hypermetabolic right paratracheal mediastinal and right hilar yaw metastatic disease. For example, on image 137, hypermetabolic right paratracheal lymph node measures 7 mm in short axis with max SUV of 3.7, previously approximately 1.3 cm   with max SUV of 13.0.     Stable mildly FDG avid semisolid  groundglass nodular opacity involving the left upper lobe measuring 2.6 x 1.1 cm on image 142 of series 3 with max SUV of 1.9, previously similar in size with max SUV of 2.2.  CT images: Atherosclerotic vascular calcifications including those of the coronary arteries are noted. Emphysematous changes.     1. New hypermetabolic soft tissue mass involving the right anorectal fat medial to the right ischial tuberosity. While this finding could reflect hypermetabolic metastatic disease related to patient's primary bronchogenic carcinoma the distribution is   atypical for metastatic disease from bronchogenic carcinoma. Therefore, consider further evaluation with tissue sampling to exclude secondary hypermetabolic malignancy.     2. Persistent hypermetabolic right upper lobe bronchogenic carcinoma with hypermetabolic mediastinal and right hilar yaw metastatic disease which overall is improved since prior PET/CT.     3. Hypermetabolic left occipital lobe metastatic disease. Patient's known intracranial metastatic disease was better characterized on prior MRI of the brain.     Today, she completed SRS at [place] and is on a steroid taper; 2 mg daily decadron and will go to 1 mg daily decadron next week.  Completed RT 8 August 2024.  Doing well, no confusion, ambulating without issue.  Is on Keppra which she will remain on; 500 mg bid given.  Decadron will continue for now and probably for another additional week due to potential recall inflammation with Pembrolizumab.  She will get first line treatment in the metastatic setting with Carboplatin/Pemetrexed/Pembrolizumab.  Would like to start in one week.  Consent obtained.  Concern with IO causing more inflammation in the brain  as crosses BBB and thus may continue on 1 mg decadron which is equivalent to 5 mg prednisone and thus will not impede efficacy of IO therapy     Does have new right ischial tuberosity lesion on PET which she is having pain.  Will get MRI and may need  RT for pain palliation but unclear if this is metastatic disease vs new primary as unusual for lung to spread in this fashion.     She has no fevers, bowel issues/changes to suggest abscess.             Interval History:  9/3/2024        Admitted 8//21-25 for increased vasogenic edema from too quick steroid taper.  Will have to do slower taper; hold IO until at 2 mg decadron      Current on 1 mg 3x this week and then 1 mg for one day next week then off.  Did have hairline fracture elbow due to fall after discharge.      Had an MRI right pelvis 8/30/2024 due to increasing pain and lesion noted on PET which is larger and has more pain     Soft tissue mass in the right ischioanal fat measuring 3.8 x 2.6 x 3.4 cm (5/33, 2/11), corresponding to the FDG avid lesion seen on prior PET/CT. It has hyperintense T2 weighted and hypointense T1-weighted signal with thick nodular   and irregular enhancement predominantly along its periphery with hypoenhancement at the center. Lesion size and has increased in size from prior PET CT dated 8/5/2024 at which time it measured 2.7 x 2.0 cm and CT study dated 7/3/2024 at which time it   measured 0.9 x 0.7 cm. Overall findings are suspicious for a malignant lesion, likely metastasis from patient's cancer.     It is very unlikely resectable, most likely due to her NSCLC but will attempt biopsy.     Will speak with Dr Gamboa and see if can get palliative radiation to control pain.  Is 3.8x2.6x3.4 cm noted on PET as well.  Was there in June and was 0.9x0.7 cm and 8/5/2024 was 2.7x2.0     She has not felt confused back on decadron and am concerned with stopping decadron     She will now be treated in the C2 slot on 16th Sept 2024 with her first cycle of Carboplatin/Alimta/Pembrolizumab     Her TSH baseline was elevated at 16.4 with free T4 0.77 and thus will start synthroid 50 mcg daily repeat TFTs in 6 weeks      Interval History: 9/17/2024     Patient is here for treatment and hospital follow  up.     Admitted 9/9/24 to 9/12/24 for AMS found to have MRI Brain showing increased cerebral vasogenic edema after recently steroid taper (not stopped), c/f leptomeningeal mets, evidence of new tiny temporal lobe met, evidence of unchanged parietal and occipital mets, and evidence of decreased frontal lobe and cerebellar mets.  She was also noted to have a new mass of her deep R gluteal fossa.  Her mental status rapidly improved with steroids.  Patient underwent LP and biopsy of her R gluteal mass.  Initial LP studies showed 0 WBCs, 0 RBCs, normal glucose, slightly elevated protein at 73, and Gram stain and cultures were negative ruling out meningitis and ICH.  No evidence thus far of leptomeningeal metastasis on LP.  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     She was placed on a steroid taper at discharge on 9/12 as follows:       -dexamethasone 4 BID x4 days f/b       -dexamethasone 4 AM and 2 PM x7 days f/b       -dexamethasone 2 BID x7 days f/b       -dexamethasone 2 daily with further taper per heme/onc     Her levothyroxine was discontinued and will be followed with serial blood draws.     Labs from the day of her discharge (9/13/24) showed folate >22, B12 1268, , WBC 8.5, Hb 10.5, Plt 264, TSH 0.403, FT40.92, and FT3 2.33.     Patient endorsed acute on chronic anal pain that has worsened since her anal mass biopsy.  The pain is sharp and severe and occasionally radiates down her leg.  It is best in the morning after taking her steroids.  She was prescribed oxycodone 5 mg Q4H (36 MME) at hospital discharge which has not helped her pain.  She spoke with palliative over the phone yesterday and has an appointment with them tomorrow.  They recommended changing from oxycodeone to Dilaudid 2 mg PO Q4H (60 MME) and they gave her Narcan.  She is very concerned about taking opioids as she lives alone and would not have anyone to give her Narcan.     She is  amanable to radiation therapy and would like to start as soon as possible.           Interval History: 9/23/2024     Biopsy right subcutaneous pelvic mass is consistent with new primary SCC     This addendum is issued to add immunohistochemistry results. The final diagnosis stays the same.  Tumor cells are negative for napsin A.   Addendum electronically signed by Lulu Bailey MD on 9/17/2024 at  8:18 AM   Final Diagnosis   A. Mass, Superficial perianal mass:  - Squamous cell carcinoma.     Note: The patient's prior lung EBUS sampling shows the tumor to stain for TTF1 and Napsin with absent p40 expression.  The current perianal mass sampling shows diffuse p40 expression with absent TTF1 expression.  This may represent a primary anal/perianal squamous cell carcinoma versus a possible metastatic combined lung tumor (adenocarcinoma and previously unsampled squamous component).  Suggest clinical correlation and appropriate follow-up.      Immunohistochemistry was performed on block A3. The tumor cells are positive for p40, AE1/3, CK7, GATA3; MOC-13 shows nonspecific staining and negative for CDX2, p16, CD45, CD31, synaptophysin, chromogranin, mucicarmine, p53 (wild-type), supporting the above diagnosis.  Napsin A is pending and results will be reported in an addendum.         At this point, Rad Onc needs to radiate for palliation as with significant pain.  Started Celebrex last week as oxycodone was not helping, will not take dilaudid as prescribed by Palliative     At issue is two primary cancers, her lung metastatic, she is not going to tolerate treatment for anal cancer nor a workup and can consider chemo with a taxane to cover SCC  However the immediate issue is the significant pain she has and RT is more appropriate         Plan is to start RT with 15 fractions within the week     Pain better with Celebrex 200 mg bid and Tylenol.  States holds her pain and again wants no narcotics.     She does have moon face from  decadron but feels well and with the taper at 2 mg bid at present is not losing balance, having CNS issues     Plan is 2 mg decadron next week     For chemo 7th Oct although concern with IO issues with RT as well as chemo sensitization but do not want to delay chemotherapy at this point                  Interval History: 10/9/2024     Started C1 of Carbooplatin/Pemetrexed/Pembrolizumab which tolerated without issues.  Pain is issue; started RT 6/15 fractions.  For pain control her decadron was increased to 4 mg which inactivated the effectiveness of IO as cannot get above 10 mg prednisone/2 mg decadron.  Pain no better.  Should be seen by Palliative care for pain management     Otherwise, did well with the chemotherapy     Labs 10/3/2024 include Na 139 K 4.5 Cr 0.87 Ca 8.8 ALT/Ast 12/12/ WBC 8.1 Hgb 10.2  plts 220 ANC 7170 lipase 39                Interval History:   11/5/2024    Admission 10/16/2024-11/4/2024    Again, admitted dizziness/headaches; had been weaned off decadron.  No new brain lesions on MRI; restarted decadron currently again at 2 mg and will remain at this dose.  Synthroid iremains at 50 mcg daily Is very fatigued.  Has COVID diagnosed two days ago on paxlovid.  Has no symptoms at present.  Anxious to restart chemotherapy. Is being followed by Palliative .  Weight 116 pounds.  Facial edema, no SOB/CAIN. Fatigued Continues to have significant anal pain, using NSAIDS.  Radiation was not effective         MRI 10/21/2024      Redemonstration of intracranial metastatic lesions  2.3 cm enhancing lesion at the left occipital lobe is slightly increased in maximum dimension with increased central necrosis at the anterior margin that may be partly due to radiation necrosis.  Additional smaller metastases are stable or mildly decreased in size.  Stable left parieto-occipital edema with mass effect on the left lateral ventricle.  No new metastases. No acute infarct or hemorrhage       CT CAP  10/22/2024       CHEST     LUNGS: 2.5 x 1.8 x 1.9 cm irregular, necrotic right upper lobe medial juxtapleural mass (6/38 and 601/43) has decreased in size from previous study, previously 4.6 x 4.8 x 3.3 cm by report. A 7 x 5 mm spiculated nodule posterior to the dominant mass is   again noted which appears similar (6/41). Another previously described satellite nodule anterior and inferior to the dominant mass in the right lobe previously measuring up to 1.4 cm currently measures about 0.8 x 0.4 cm (6/50. There is some linear   scarring or atelectasis also noted anteriorly and medially from the dominant mass toward the pleura.     New 8 mm right lower lobe nodule (6/94).  New 5 mm left lower lobe nodule (6/93).     Stable 2.4 cm groundglass opacity anterior left upper lobe (6/43).     Mild background emphysema. Mild scattered linear atelectasis or scarring in the lungs.     AIRWAYS: No significant filling defect.     PLEURA: No pleural effusion. Stable right apical pleural-parenchymal scarring. Pleural calcification posterior left lower lobe again noted.     HEART/GREAT VESSELS: Heart is unremarkable for patient's age. Atherosclerotic changes thoracic aorta and coronary arteries. No thoracic aortic aneurysm.     MEDIASTINUM AND SUDEEP:  -Right paratracheal lymph node measuring 1.0 x 0.7 cm (2/44), previously 2.4 x 2.2 cm.     -1.2 x 0.8 cm precarinal lymph node (2/50), significantly decreased from previous diagnostic study in July 2024, difficult to directly compare to previous size on noncontrast PET CT but also possibly slightly smaller. Some FDG avidity was present in this   region on the prior PET study.     -Right hilar lymph node seen on the prior diagnostic CT measures about 6 mm short axis (2/56), previously about 1.7 cm when measured in a similar fashion and is of lower density currently.     -4 mm short axis subcarinal lymph node, previously measured about 9 mm.     The esophagus is unremarkable.     CHEST  WALL AND LOWER NECK: Subcentimeter bilateral thyroid nodules. Incidental discovery of one or more thyroid nodule(s) measuring less than 1.5 cm and without suspicious features is noted in this patient who is above 35 years old; according to   guidelines published in the February 2015 white paper on incidental thyroid nodules in the Journal of the American College of Radiology (JACR), no further evaluation is recommended.     ABDOMEN     LIVER/BILIARY TREE: Mild hepatomegaly. Tiny hypodensity in the left hepatic lobe (2/107), stable in retrospect. Mild prominence of the common hepatic duct with tapering of the distal CBD, may be age-related.     GALLBLADDER: No calcified gallstones. No pericholecystic inflammatory change.     SPLEEN: Unremarkable.     PANCREAS: Unremarkable.     ADRENAL GLANDS: Unremarkable.     KIDNEYS/URETERS: There is an ill-defined 2.2 x 1.8 cm hypodensity within the medial upper pole right kidney which is new from the previous CT study and not clearly detectable on prior PET/CT either. There is no significant perinephric fat stranding or   elevated white blood cell count to suggest pyelonephritis, therefore this is suspicious for metastasis. There is also a 6 mm hypodensity in the posterior lower pole right kidney, barely if at all perceptible on the prior contrast-enhanced CT study.   Although this could correspond to enlargement of a complex subcentimeter cyst (as there are several additional small cysts in the right kidney), additional metastasis is not excluded.  Tiny nonobstructing calculi right kidney measuring up to 2 mm.     Several subcentimeter left renal hypodensities are too small to characterize, unchanged from prior study. No hydronephrosis on either side.     STOMACH AND BOWEL: Colonic diverticulosis without findings of acute diverticulitis.     APPENDIX: No findings to suggest appendicitis.     ABDOMINOPELVIC CAVITY: No diffuse ascites. Trace presacral edema which is new. No  pneumoperitoneum. No lymphadenopathy.     VESSELS: Atherosclerotic changes abdominal aorta without evidence of aneurysm.     PELVIS     REPRODUCTIVE ORGANS: Unremarkable for patient's age.     URINARY BLADDER: Tiny focus of gas within the urinary bladder, presumably related to recent catheterization. Correlate clinically.     ABDOMINAL WALL/INGUINAL REGIONS: Enlarging 6.3 x 4.0 x 5.0 cm irregular rim-enhancing mass with central low attenuation in the right ischiorectal fossa abutting the anteromedial margin of the right gluteus britt muscle, the adjacent obturator internus   as well as the right levator ani muscle but without discrete muscle invasion of these structures. This was subcentimeter in size on previous CT study from July in retrospect, measured about 2.7 x 2.0 cm on the previous PET/CT and was FDG avid. This   almost certainly represents significant enlargement of a necrotic metastasis given interval enlargement over recent subsequent study and could be easily verified with percutaneous biopsy if necessary.     BONES: No acute fracture or suspicious osseous lesion.     IMPRESSION:     1. New small bilateral pulmonary nodules, likely metastases.     2. New right renal lesion(s) concerning for metastasis.     3. Significant interval enlargement of necrotic appearing mass in the right ischiorectal fossa, likely metastasis.     4. Decreasing right upper lobe necrotic mass with stable and/or decreasing satellite nodularity.     5. Improved mediastinal and right hilar adenopathy.     6. Stable 2.4 cm left upper lobe groundglass density.         REVIEW OF SYSTEMS: as above   Please note that a 14-point review of systems was performed to include Constitutional, HEENT, Respiratory, CVS, GI, , Musculoskeletal, Integumentary, Neurologic, Rheumatologic, Endocrinologic, Psychiatric, Lymphatic, and Hematologic/Oncologic systems were reviewed and are negative unless otherwise stated in HPI. Positive and negative  findings pertinent to this evaluation are incorporated into the history of present illness.      ECOG PS: 2    PROBLEM LIST:  Patient Active Problem List   Diagnosis    TB lung, latent    Psoriatic arthritis (HCC)    Essential thrombocytosis (HCC)    Hypertension    Mixed hyperlipidemia    Encounter for monitoring of hydroxyurea therapy    JAK2 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    Age-related osteoporosis without current pathological fracture    Malignant neoplasm of upper lobe, right bronchus or lung (HCC)    Bilateral leg pain    Insomnia    Moderate protein-calorie malnutrition (HCC)    Dehydration    Visual disturbance    Metastatic cancer to brain (HCC)    Brain mass    Malignant neoplasm metastatic to brain (HCC)    Acute metabolic encephalopathy    Altered mental status    Hypothyroidism    Abnormal imaging of central nervous system    Right hip pain    Goals of care, counseling/discussion    Cancer associated pain    Palliative care patient    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 2023), Essential thrombocytosis (HCC), Hyperlipidemia, Hypertension, Mass in chest, Nodular goiter, Osteoporosis, Pneumonia (Oct 16, 2023), Psoriasis, and SIRS (systemic inflammatory response syndrome) (HCC) (06/22/2024).    PAST SURGICAL HISTORY:   has a past surgical history that includes Appendectomy; Mammo needle localization right (all inc) (Right, 07/13/2009); Skin lesion excision; Colonoscopy (10/31/2018); Mammo (historical); DXA procedure(historical) (04/21/2017); Breast cyst excision (Right, 2009); IR lumbar puncture (9/12/2024); and IR biopsy other (9/12/2024).    CURRENT MEDICATIONS  No current facility-administered medications for this visit.     No current outpatient medications on file.     Facility-Administered  Medications Ordered in Other Visits   Medication Dose Route Frequency Provider Last Rate Last Admin    acetaminophen (TYLENOL) tablet 650 mg  650 mg Oral Q6H [facility], DO   650 mg at 11/03/24 0550    amLODIPine (NORVASC) tablet 5 mg  5 mg Oral Daily [facility], DO   5 mg at 11/03/24 0907    [Held by provider] atorvastatin (LIPITOR) tablet 20 mg  20 mg Oral Daily With Dinner [facility] Lee, DO   20 mg at 11/01/24 1706    dexamethasone (DECADRON) tablet 2 mg  2 mg Oral Q12H [facility], DO   2 mg at 11/03/24 0550    Followed by    [START ON 11/8/2024] dexamethasone (DECADRON) tablet 2 mg  2 mg Oral Daily [facility] Lee, DO        diphenhydramine, lidocaine, Al/Mg hydroxide, simethicone (Magic Mouthwash) oral solution 10 mL  10 mL Swish & Spit Q4H PRN [facility] Timothy, DO        enoxaparin (LOVENOX) subcutaneous injection 30 mg  30 mg Subcutaneous Q24H [facility], DO   30 mg at 11/03/24 0908    fentaNYL injection   Intravenous PRN Greg Gamble CRNA        gabapentin (NEURONTIN) capsule 100 mg  100 mg Oral BID before breakfast/lunch Salome Underwood DO   100 mg at 11/03/24 0549    gabapentin (NEURONTIN) capsule 300 mg  300 mg Oral HS Salome Underwood, DO   300 mg at 11/02/24 2136    insulin lispro (HumALOG/ADMELOG) 100 units/mL subcutaneous injection 1-5 Units  1-5 Units Subcutaneous TID AC Ammar Alam, DO        levETIRAcetam (KEPPRA) tablet 1,000 mg  1,000 mg Oral Q12H [facility] Lee, DO   1,000 mg at 11/03/24 0906    levothyroxine tablet 50 mcg  50 mcg Oral Early Morning Holly Redmond MD   50 mcg at 11/03/24 0550    lidocaine (LIDODERM) 5 % patch 1 patch  1 patch Topical Daily Melonie Siddiqui, DO        melatonin tablet 6 mg  6 mg Oral HS PRN Tamar Jolley, DO   6 mg at 11/02/24 2326    midazolam (VERSED) injection   Intravenous PRN Greg Tin Kaub, CRNA        nirmatrelvir 300 mg (150 mg x 2) and ritonavir 100 mg x 1 (Paxlovid) therapy pack  3 tablet Oral BID Reginald Wang  MD Mata   3 tablet at 11/03/24 0910    ondansetron (ZOFRAN) injection 4 mg  4 mg Intravenous Q4H PRN Holly Redmond MD        pantoprazole (PROTONIX) EC tablet 40 mg  40 mg Oral Early Morning Holly Redmond MD   40 mg at 11/03/24 0550    senna-docusate sodium (SENOKOT S) 8.6-50 mg per tablet 2 tablet  2 tablet Oral BID Holly Redmond MD   2 tablet at 11/03/24 0906     [unfilled]    [employer] HISTORY:   reports that she has quit smoking. Her smoking use included cigarettes. She started smoking about 58 years ago. She has a 58.8 pack-year smoking history. She has been exposed to tobacco smoke. She has never used smokeless tobacco. She reports that she does not currently use alcohol. She reports that she does not use drugs.     FAMILY HISTORY:  family history includes Cancer in her brother and brother; Coronary artery disease in her brother; Depression in her mother; Hearing loss in her mother; Hypertension in her brother and mother; No Known Problems in her daughter, daughter, maternal aunt, maternal aunt, maternal aunt, maternal aunt, maternal grandfather, maternal grandmother, paternal aunt, paternal grandfather, and paternal grandmother; Stroke in her mother; Tuberculosis in her brother and father.     ALLERGIES:  is allergic to doxycycline, keflex [cephalexin], and neomycin-polymyxin-dexameth.      Physical Exam:  Vital Signs:   Visit Vitals  OB Status Postmenopausal   Smoking Status Former     There is no height or weight on file to calculate BMI.  There is no height or weight on file to calculate BSA.    GEN: Alert, awake oriented x3, in no acute distress  HEENT- No pallor, icterus, cyanosis, no oral mucosal lesions,   LAD - no palpable cervical, clavicle, axillary, inguinal LAD  Heart- normal S1 S2, regular rate and rhythm, No murmur, rubs.   Lungs- clear breathing sound bilateral.   Abdomen- soft, Non tender, bowel sounds present  Extremities- No cyanosis, clubbing, edema  Neuro- No focal neurological  deficit    Labs:  Lab Results   Component Value Date    WBC 10.44 (H) 11/03/2024    HGB 10.6 (L) 11/03/2024    HCT 33.6 (L) 11/03/2024     (H) 11/03/2024     11/03/2024     Lab Results   Component Value Date    SODIUM 140 11/03/2024    K 4.3 11/03/2024     11/03/2024    CO2 24 11/03/2024    AGAP 12 11/03/2024    BUN 25 11/03/2024    CREATININE 0.77 11/03/2024    GLUC 224 (H) 11/03/2024    GLUF 100 (H) 10/03/2024    CALCIUM 8.4 11/03/2024    AST 12 (L) 10/21/2024    ALT 12 10/21/2024    ALKPHOS 47 10/21/2024    TP 7.1 10/21/2024    TBILI 0.27 10/21/2024    EGFR 76 11/03/2024           I spent 40 minutes on chart review, face to face counseling time, coordination of care and documentation.    Rosalinda Castro MD PhD

## 2024-11-03 NOTE — ASSESSMENT & PLAN NOTE
Patient with history of non-small cell lung adenocarcinoma diagnosed in March 2024.  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  She completed SRS for these metastatic lesions in August 2024.  She is on Decadron in the outpatient setting and has had multiple admissions due to episodes of altered mental status with increased vasogenic edema after attempting to taper Decadron dosing.  She presented to [facility] on 10/20 with worsening confusion and a stroke alert was called.  Imaging showed no acute findings.  She was loaded with Keppra, started on Decadron, and transferred to [place] for video EEG and MRI brain under neurocritical care for close monitoring.  The morning of 10/21 patient's mental status reportedly much improved and she was deemed stable for transfer out of the ICU.    Symptoms likely in the setting of Decadron taper    Of note, patient is not exactly sure what her current Decadron dosing is.  With that said, it appears that she had been on a Decadron taper throughout September starting at 8 mg daily that was reduced each week.  Her most recent orders show Decadron 1 mg daily from 10/17-10/24 and then 0.5 mg daily for 8 more days    vEEG update as of today: 26 hours of recording. No seizures. Continues to have left hemisphere slowing, left posterior quadrant maximal. There are also left posterior temporal / parietal poorly formed (blunt and at times sharply contoured) LPDs occurring at 0.5-1 Hz. LPDs indicate high seizure risk and are often associated with acute/subacute destructive neuronal injury. Right hemisphere is essentially normal.     Blood cultures no growth after 5 days    Plan:  - MRI 10/21 show stable vasogenic edema in the left parietotemporal region; previously demonstrated left posterior parietal lesion as well visualized likely due to differences in technique.  No new metastasis, no new acute infarct or hemorrhage.  -  Neurosurgery, neurology, and palliative care consulted.  Appreciate assistance  - Per neurosurgery, no emergent neurosurgical intervention warranted  - Per neurology, continue Keppra 100 mg BID , okay to discharge with this dosage.  Also recommended Plavix or aspirin given right monocular inferior temporal vision for impairment at high risk of developing CV A. Follow-up with epilepsy clinic in 4 to 6 weeks  - Monitor on video EEG  - On slow decadron taper, confirmed with patient's outpatient oncologist via secure Epic chat messaging and is in agreement with plan:  Decadron 4mg PO twice daily for 5 days from 10/29/24 to 11/2/24  Decadron 2mg PO twice daily for 5 days from 11/3/24 to 11/7/24  Decadron 2mg PO daily until her next MRI on 12/27 (she can try to self taper to 1mg during this interval if she wishes)   - PPI for gastric prophylaxis in setting of prolonged Decadron use; patient taking Decadron chronically prior to admission.  - Continue monitoring glucose while on dexamethasone therapy  - SBP goal <160  - Stat CT head with any neurological decline for return of altered mental status  - Seizure precautions in place  - Per Neuropsych, patient has the capacity to make informed medical decisions  - Per CM, SNF denied, tentative plan home with home health on Monday

## 2024-11-03 NOTE — PROGRESS NOTES
Progress Note - Internal Medicine   Name: Ayla Nye 74 y.o. female I MRN: [id number]  [facility] Date of Admission: 10/21/2024   Date of Service: 11/3/2024 I Hospital Day: 13    Assessment & Plan  Metastatic cancer to brain (HCC)  Patient with history of non-small cell lung adenocarcinoma diagnosed in March 2024.  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  She completed SRS for these metastatic lesions in August 2024.  She is on Decadron in the outpatient setting and has had multiple admissions due to episodes of altered mental status with increased vasogenic edema after attempting to taper Decadron dosing.  She presented to [facility] on 10/20 with worsening confusion and a stroke alert was called.  Imaging showed no acute findings.  She was loaded with Keppra, started on Decadron, and transferred to [place] for video EEG and MRI brain under neurocritical care for close monitoring.  The morning of 10/21 patient's mental status reportedly much improved and she was deemed stable for transfer out of the ICU.    Symptoms likely in the setting of Decadron taper    Of note, patient is not exactly sure what her current Decadron dosing is.  With that said, it appears that she had been on a Decadron taper throughout September starting at 8 mg daily that was reduced each week.  Her most recent orders show Decadron 1 mg daily from 10/17-10/24 and then 0.5 mg daily for 8 more days    vEEG update as of today: 26 hours of recording. No seizures. Continues to have left hemisphere slowing, left posterior quadrant maximal. There are also left posterior temporal / parietal poorly formed (blunt and at times sharply contoured) LPDs occurring at 0.5-1 Hz. LPDs indicate high seizure risk and are often associated with acute/subacute destructive neuronal injury. Right hemisphere is essentially normal.     Blood cultures no growth after 5 days    Plan:  -  MRI 10/21 show stable vasogenic edema in the left parietotemporal region; previously demonstrated left posterior parietal lesion as well visualized likely due to differences in technique.  No new metastasis, no new acute infarct or hemorrhage.  - Neurosurgery, neurology, and palliative care consulted.  Appreciate assistance  - Per neurosurgery, no emergent neurosurgical intervention warranted  - Per neurology, continue Keppra 100 mg BID , okay to discharge with this dosage.  Also recommended Plavix or aspirin given right monocular inferior temporal vision for impairment at high risk of developing CV A. Follow-up with epilepsy clinic in 4 to 6 weeks  - Monitor on video EEG  - On slow decadron taper, confirmed with patient's outpatient oncologist via secure Epic chat messaging and is in agreement with plan:  Decadron 4mg PO twice daily for 5 days from 10/29/24 to 11/2/24  Decadron 2mg PO twice daily for 5 days from 11/3/24 to 11/7/24  Decadron 2mg PO daily until her next MRI on 12/27 (she can try to self taper to 1mg during this interval if she wishes)   - PPI for gastric prophylaxis in setting of prolonged Decadron use; patient taking Decadron chronically prior to admission.  - Continue monitoring glucose while on dexamethasone therapy  - SBP goal <160  - Stat CT head with any neurological decline for return of altered mental status  - Seizure precautions in place  - Per Neuropsych, patient has the capacity to make informed medical decisions  - Per CM, SNF denied, tentative plan home with home health on Monday  Hypertension  Documented history of hypertension but not on any antihypertensives at home.  Blood pressure is acceptable this admission  Patient and chart review, patient usually taking multipin 5 mg and losartan 50 mg daily before being discontinued in June 2024    BP overnight 100-130s/70-80s    Plan:  - Continue amlodipine 5 mg    Mixed hyperlipidemia  Lipid panel 7/20/2024: , TG 76, HDL 60, LDL  121  ASCVD of 18.4%.  Not on any statin therapy per chart review    Plan:  - Hold Lipitor 20 mg daily given starting Paxlovid therapy   Malignant neoplasm of upper lobe, right bronchus or lung (HCC)  Patient with a history of non-small cell lung cancer of the right upper lobe with metastasis to the brain diagnosed in March 2024.  She was on weekly carboplatin and paclitaxel with radiation from 5/23/2024 - 7/5/2024.  Further imaging after treatment showed signs concerning of recurrent metastatic NSCLC now with possible metastasis to the pelvis.  For this, it was recommended that she undergo systemic chemoimmunotherapy with carboplatin, pemetrexed, and pembrolizumab.  Her first cycle was on 10/7/2024.    Patient has concern about her CA treatment, and I reached out to her oncologist, Dr. Rosalinda Castro, who agreed that  inpatient onc service would not able to provide more other than to reiterate OP follow up.     Plan:  - Patient following closely with oncology in the outpatient setting  - Remainder of care for active concerns as mentioned above  - CT chest abdomen and pelvis revealed new small bilateral pulmonary nodules, likely metastasis, new right renal lesion(s) concerning for metastasis, significant interval enlargement of necrotic appearing mass in the right ischiorectal fossa, likely metastasis, deceasing RUL necrotic mass with stable and/or decreasing satellite nodularity, improved mediastinal and right hilar adenopathy, stable 2.4 cm CHUYITA groundglass density.  - Patient will f/u with heme-onc on 11/5/24    Hypothyroidism  Continue PTA levothyroxine 50 mcg daily  Palliative care patient  Palliative care following     Patient states pain is stable or well-controlled with scheduled Tylenol and Gabapentin.     Plan:   - Continue Tylenol 650 mg every 6 hours  - Continue gabapentin 300 mg at bedtime  - Continue gabapentin 100 mg in the morning and afternoon   - Patient would like to avoid opioids if  possible  Anemia  Hgb 8.9 and  on admission. 9/13/24 folate >22.3, B12 1,268. 9/5/23 MMA wnl. Patient has been having chronic macrocytic anemia since 9/2023.    Plan:  - Continue to monitor hgb   Leukocytosis  WBC 11.75 from 10.0 10/26. WBC has been increasing since admission of 3.0. Patient is on dexamethasone. Remains afebrile no URI and urinary symptoms. Afebrile, VSS. Low suspicions for infection at this time. Currently on steroid taper.    Patient is concerned that she may have COVID given her daughter tested positive for COVID. She does have mild sore throat, dry cough, and rhinorrhea. Denies fever, chills, and myalgias.     Plan:  - Leukocytosis most likely 2/2 steroid and COVID  Hyponatremia  Sodium 124 on 11/1/24  Euvolemic on exam. TSH 0.54 10/21/24. Hx of adenocarcinoma lung CA with mets to brain. Pain on baseline. Most likely SIADH picture.  No neuro deficits. AAOX3.    Sodium this morning 11/2/24: 138    Plan:  - Continue to monitor   - Will remove fluid restriction of 1.8L  - F/u on serum osmo, urine osmo, and urine sodium    COVID-19  Patient has been having sore throat, dry cough in the morning, and rhinorrhea. Patient's daughter tested positive for COVID-19..   COVID test came back positive.  Patient is afebrile and sating well on RA >90%, without need of O2.   Given age and history of cancer, we will treat.    Plan  - Paxlovid for 5 days (D1/5)  - Continue to monitor O2 need        Disposition: potential dc on Monday    Team: SOD TEAM C    Subjective   Patient seen and examined. No acute events overnight.  Cough is  better.  Tolerating oral intake.  Daughter at bedside.    Objective :  Temp:  [97.6 °F (36.4 °C)-98.8 °F (37.1 °C)] 97.9 °F (36.6 °C)  HR:  [63-88] 73  BP: (114-135)/(67-77) 114/73  Resp:  [14-19] 14  SpO2:  [91 %-96 %] 94 %  O2 Device: None (Room air)    I/O         10/20 0701  10/21 0700 10/21 0701  10/22 0700    I.V. (mL/kg) 146.3 442.5 (8.5)    IV Piggyback  50    Total  Intake(mL/kg) 146.3 492.5 (9.4)    Urine (mL/kg/hr) 650 1165 (0.9)    Total Output 650 1165    Net -503.8 -672.5          Unmeasured Urine Occurrence  1 x          Weights:        Body mass index is 20.28 kg/m².  Weight (last 2 days)       Date/Time Weight    11/03/24 0537 50.3 (110.89)    11/02/24 0600 49.5 (109.13)    11/01/24 0600 50.5 (111.33)            Physical Exam  Vitals and nursing note reviewed.   Constitutional:       General: She is not in acute distress.     Appearance: She is well-developed.   HENT:      Head: Normocephalic and atraumatic.      Mouth/Throat:      Mouth: Mucous membranes are moist.      Pharynx: No oropharyngeal exudate or posterior oropharyngeal erythema.      Comments:     Eyes:      General: No scleral icterus.     Extraocular Movements: Extraocular movements intact.      Conjunctiva/sclera: Conjunctivae normal.   Cardiovascular:      Rate and Rhythm: Normal rate and regular rhythm.      Pulses: Normal pulses.      Heart sounds: Normal heart sounds. No murmur heard.  Pulmonary:      Effort: Pulmonary effort is normal. No respiratory distress.      Comments: Decreased breath sounds in R side more prominent in RUL    Abdominal:      General: Bowel sounds are normal.      Palpations: Abdomen is soft.      Tenderness: There is no abdominal tenderness.   Musculoskeletal:         General: No swelling.      Cervical back: Neck supple.      Right lower leg: No edema.      Left lower leg: No edema.   Lymphadenopathy:      Cervical: No cervical adenopathy.   Skin:     General: Skin is warm and dry.      Capillary Refill: Capillary refill takes less than 2 seconds.      Findings: No bruising or lesion.   Neurological:      General: No focal deficit present.      Mental Status: She is alert and oriented to person, place, and time.      Cranial Nerves: Cranial nerves 2-12 are intact. No cranial nerve deficit.      Motor: No weakness.      Comments: Upper extremities 5/5  Lower extremities 5/5    Psychiatric:         Mood and Affect: Mood normal.           Lab Results: I have reviewed the following results:  Recent Labs     11/03/24  0559   WBC 10.44*   HGB 10.6*   HCT 33.6*      SODIUM 140   K 4.3      CO2 24   BUN 25   CREATININE 0.77   GLUC 224*         Imaging Results Review: I personally reviewed the following image studies in PACS and associated radiology reports: chest xray, CT head, CT A neck and head, and MRI brain. My interpretation of the radiology images/reports is:  .  Other Study Results Review: EKG was reviewed.     Currently Ordered Meds:   Current Facility-Administered Medications:     acetaminophen (TYLENOL) tablet 650 mg, Q6H KRISS    amLODIPine (NORVASC) tablet 5 mg, Daily    [Held by provider] atorvastatin (LIPITOR) tablet 20 mg, Daily With Dinner    [COMPLETED] dexamethasone (DECADRON) tablet 4 mg, Q6H KRISS **FOLLOWED BY** [COMPLETED] dexamethasone (DECADRON) tablet 4 mg, Q12H KRISS **FOLLOWED BY** dexamethasone (DECADRON) tablet 2 mg, Q12H KRISS **FOLLOWED BY** [START ON 11/8/2024] dexamethasone (DECADRON) tablet 2 mg, Daily    diphenhydramine, lidocaine, Al/Mg hydroxide, simethicone (Magic Mouthwash) oral solution 10 mL, Q4H PRN    enoxaparin (LOVENOX) subcutaneous injection 30 mg, Q24H KRISS    gabapentin (NEURONTIN) capsule 100 mg, BID before breakfast/lunch    gabapentin (NEURONTIN) capsule 300 mg, HS    insulin lispro (HumALOG/ADMELOG) 100 units/mL subcutaneous injection 1-5 Units, TID AC **AND** Fingerstick Glucose (POCT), TID AC    levETIRAcetam (KEPPRA) tablet 1,000 mg, Q12H KRISS    levothyroxine tablet 50 mcg, Early Morning    lidocaine (LIDODERM) 5 % patch 1 patch, Daily    melatonin tablet 6 mg, HS PRN    nirmatrelvir 300 mg (150 mg x 2) and ritonavir 100 mg x 1 (Paxlovid) therapy pack, BID    ondansetron (ZOFRAN) injection 4 mg, Q4H PRN    pantoprazole (PROTONIX) EC tablet 40 mg, Early Morning    senna-docusate sodium (SENOKOT S) 8.6-50 mg per tablet 2 tablet,  BID    Facility-Administered Medications Ordered in Other Encounters:     fentaNYL injection, PRN    midazolam (VERSED) injection, PRN  VTE Pharmacologic Prophylaxis: Sequential compression device (Venodyne)  and Enoxaparin (Lovenox)  VTE Mechanical Prophylaxis: sequential compression device    Administrative Statements   I have spent a total time of 45 minutes in caring for this patient on the day of the visit/encounter including Diagnostic results, Prognosis, Patient and family education, Importance of tx compliance, Counseling / Coordination of care, Documenting in the medical record, Reviewing / ordering tests, medicine, procedures  , and Obtaining or reviewing history  .  Portions of the record may have been created with voice recognition software.

## 2024-11-03 NOTE — PLAN OF CARE
Problem: PAIN - ADULT  Goal: Verbalizes/displays adequate comfort level or baseline comfort level  Description: Interventions:  - Encourage patient to monitor pain and request assistance  - Assess pain using appropriate pain scale  - Administer analgesics based on type and severity of pain and evaluate response  - Implement non-pharmacological measures as appropriate and evaluate response  - Consider cultural and social influences on pain and pain management  - Notify physician/advanced practitioner if interventions unsuccessful or patient reports new pain  Outcome: Progressing     Problem: INFECTION - ADULT  Goal: Absence or prevention of progression during hospitalization  Description: INTERVENTIONS:  - Assess and monitor for signs and symptoms of infection  - Monitor lab/diagnostic results  - Monitor all insertion sites, i.e. indwelling lines, tubes, and drains  - Monitor endotracheal if appropriate and nasal secretions for changes in amount and color  - [place] appropriate cooling/warming therapies per order  - Administer medications as ordered  - Instruct and encourage patient and family to use good hand hygiene technique  - Identify and instruct in appropriate isolation precautions for identified infection/condition  Outcome: Progressing     Problem: SAFETY ADULT  Goal: Patient will remain free of falls  Description: INTERVENTIONS:  - Educate patient/family on patient safety including physical limitations  - Instruct patient to call for assistance with activity   - Consult OT/PT to assist with strengthening/mobility   - Keep Call bell within reach  - Keep bed low and locked with side rails adjusted as appropriate  - Keep care items and personal belongings within reach  - Initiate and maintain comfort rounds  - Make Fall Risk Sign visible to staff  - Offer Toileting every 2 Hours, in advance of need  - Initiate/Maintain bed alarm  - Apply yellow socks and bracelet for high fall risk patients  - Consider  moving patient to room near nurses station  Outcome: Progressing  Goal: Maintain or return to baseline ADL function  Description: INTERVENTIONS:  -  Assess patient's ability to carry out ADLs; assess patient's baseline for ADL function and identify physical deficits which impact ability to perform ADLs (bathing, care of mouth/teeth, toileting, grooming, dressing, etc.)  - Assess/evaluate cause of self-care deficits   - Assess range of motion  - Assess patient's mobility; develop plan if impaired  - Assess patient's need for assistive devices and provide as appropriate  - Encourage maximum independence but intervene and supervise when necessary  - Involve family in performance of ADLs  - Assess for home care needs following discharge   - Consider OT consult to assist with ADL evaluation and planning for discharge  - Provide patient education as appropriate  Outcome: Progressing  Goal: Maintains/Returns to pre admission functional level  Description: INTERVENTIONS:  - Perform AM-PAC 6 Click Basic Mobility/ Daily Activity assessment daily.  - Set and communicate daily mobility goal to care team and patient/family/caregiver.   - Collaborate with rehabilitation services on mobility goals if consulted  - Perform Range of Motion 3 times a day.  - Reposition patient every 2 hours.  - Dangle patient 3 times a day  - Stand patient 2 times a day  - Ambulate patient 3 times a day  - Out of bed to chair 2 times a day   - Out of bed for meals 3 times a day  - Out of bed for toileting  - Record patient progress and toleration of activity level   Outcome: Progressing     Problem: DISCHARGE PLANNING  Goal: Discharge to home or other facility with appropriate resources  Description: INTERVENTIONS:  - Identify barriers to discharge w/patient and caregiver  - Arrange for needed discharge resources and transportation as appropriate  - Identify discharge learning needs (meds, wound care, etc.)  - Arrange for interpretive services to  assist at discharge as needed  - Refer to Case Management Department for coordinating discharge planning if the patient needs post-hospital services based on physician/advanced practitioner order or complex needs related to functional status, cognitive ability, or social support system  Outcome: Progressing     Problem: Knowledge Deficit  Goal: Patient/family/caregiver demonstrates understanding of disease process, treatment plan, medications, and discharge instructions  Description: Complete learning assessment and assess knowledge base.  Interventions:  - Provide teaching at level of understanding  - Provide teaching via preferred learning methods  Outcome: Progressing     Problem: NEUROSENSORY - ADULT  Goal: Achieves stable or improved neurological status  Description: INTERVENTIONS  - Monitor and report changes in neurological status  - Monitor vital signs such as temperature, blood pressure, glucose, and any other labs ordered   - Initiate measures to prevent increased intracranial pressure  - Monitor for seizure activity and implement precautions if appropriate      Outcome: Progressing  Goal: Remains free of injury related to seizures activity  Description: INTERVENTIONS  - Maintain airway, patient safety  and administer oxygen as ordered  - Monitor patient for seizure activity, document and report duration and description of seizure to physician/advanced practitioner  - If seizure occurs,  ensure patient safety during seizure  - Reorient patient post seizure  - Seizure pads on all 4 side rails  - Instruct patient/family to notify RN of any seizure activity including if an aura is experienced  - Instruct patient/family to call for assistance with activity based on nursing assessment  - Administer anti-seizure medications if ordered    Outcome: Progressing  Goal: Achieves maximal functionality and self care  Description: INTERVENTIONS  - Monitor swallowing and airway patency with patient fatigue and changes in  neurological status  - Encourage and assist patient to increase activity and self care.   - Encourage visually impaired, hearing impaired and aphasic patients to use assistive/communication devices  Outcome: Progressing     Problem: Prexisting or High Potential for Compromised Skin Integrity  Goal: Skin integrity is maintained or improved  Description: INTERVENTIONS:  - Identify patients at risk for skin breakdown  - Assess and monitor skin integrity  - Assess and monitor nutrition and hydration status  - Monitor labs   - Assess for incontinence   - Turn and reposition patient  - Assist with mobility/ambulation  - Relieve pressure over bony prominences  - Avoid friction and shearing  - Provide appropriate hygiene as needed including keeping skin clean and dry  - Evaluate need for skin moisturizer/barrier cream  - Collaborate with interdisciplinary team   - Patient/family teaching  - Consider wound care consult   Outcome: Progressing

## 2024-11-03 NOTE — PLAN OF CARE
Problem: PAIN - ADULT  Goal: Verbalizes/displays adequate comfort level or baseline comfort level  Description: Interventions:  - Encourage patient to monitor pain and request assistance  - Assess pain using appropriate pain scale  - Administer analgesics based on type and severity of pain and evaluate response  - Implement non-pharmacological measures as appropriate and evaluate response  - Consider cultural and social influences on pain and pain management  - Notify physician/advanced practitioner if interventions unsuccessful or patient reports new pain  Outcome: Progressing     Problem: INFECTION - ADULT  Goal: Absence or prevention of progression during hospitalization  Description: INTERVENTIONS:  - Assess and monitor for signs and symptoms of infection  - Monitor lab/diagnostic results  - Monitor all insertion sites, i.e. indwelling lines, tubes, and drains  - Monitor endotracheal if appropriate and nasal secretions for changes in amount and color  - [place] appropriate cooling/warming therapies per order  - Administer medications as ordered  - Instruct and encourage patient and family to use good hand hygiene technique  - Identify and instruct in appropriate isolation precautions for identified infection/condition  Outcome: Progressing     Problem: SAFETY ADULT  Goal: Patient will remain free of falls  Description: INTERVENTIONS:  - Educate patient/family on patient safety including physical limitations  - Instruct patient to call for assistance with activity   - Consult OT/PT to assist with strengthening/mobility   - Keep Call bell within reach  - Keep bed low and locked with side rails adjusted as appropriate  - Keep care items and personal belongings within reach  - Initiate and maintain comfort rounds  - Make Fall Risk Sign visible to staff  - Offer Toileting every 2 Hours, in advance of need  - Initiate/Maintain bed alarm  - Apply yellow socks and bracelet for high fall risk patients  - Consider  moving patient to room near nurses station  Outcome: Progressing  Goal: Maintain or return to baseline ADL function  Description: INTERVENTIONS:  -  Assess patient's ability to carry out ADLs; assess patient's baseline for ADL function and identify physical deficits which impact ability to perform ADLs (bathing, care of mouth/teeth, toileting, grooming, dressing, etc.)  - Assess/evaluate cause of self-care deficits   - Assess range of motion  - Assess patient's mobility; develop plan if impaired  - Assess patient's need for assistive devices and provide as appropriate  - Encourage maximum independence but intervene and supervise when necessary  - Involve family in performance of ADLs  - Assess for home care needs following discharge   - Consider OT consult to assist with ADL evaluation and planning for discharge  - Provide patient education as appropriate  Outcome: Progressing  Goal: Maintains/Returns to pre admission functional level  Description: INTERVENTIONS:  - Perform AM-PAC 6 Click Basic Mobility/ Daily Activity assessment daily.  - Set and communicate daily mobility goal to care team and patient/family/caregiver.   - Collaborate with rehabilitation services on mobility goals if consulted  - Perform Range of Motion 3 times a day.  - Reposition patient every 2 hours.  - Dangle patient 3 times a day  - Stand patient 2 times a day  - Ambulate patient 3 times a day  - Out of bed to chair 2 times a day   - Out of bed for meals 3 times a day  - Out of bed for toileting  - Record patient progress and toleration of activity level   Outcome: Progressing     Problem: DISCHARGE PLANNING  Goal: Discharge to home or other facility with appropriate resources  Description: INTERVENTIONS:  - Identify barriers to discharge w/patient and caregiver  - Arrange for needed discharge resources and transportation as appropriate  - Identify discharge learning needs (meds, wound care, etc.)  - Arrange for interpretive services to  assist at discharge as needed  - Refer to Case Management Department for coordinating discharge planning if the patient needs post-hospital services based on physician/advanced practitioner order or complex needs related to functional status, cognitive ability, or social support system  Outcome: Progressing     Problem: Knowledge Deficit  Goal: Patient/family/caregiver demonstrates understanding of disease process, treatment plan, medications, and discharge instructions  Description: Complete learning assessment and assess knowledge base.  Interventions:  - Provide teaching at level of understanding  - Provide teaching via preferred learning methods  Outcome: Progressing     Problem: NEUROSENSORY - ADULT  Goal: Achieves stable or improved neurological status  Description: INTERVENTIONS  - Monitor and report changes in neurological status  - Monitor vital signs such as temperature, blood pressure, glucose, and any other labs ordered   - Initiate measures to prevent increased intracranial pressure  - Monitor for seizure activity and implement precautions if appropriate      Outcome: Progressing  Goal: Remains free of injury related to seizures activity  Description: INTERVENTIONS  - Maintain airway, patient safety  and administer oxygen as ordered  - Monitor patient for seizure activity, document and report duration and description of seizure to physician/advanced practitioner  - If seizure occurs,  ensure patient safety during seizure  - Reorient patient post seizure  - Seizure pads on all 4 side rails  - Instruct patient/family to notify RN of any seizure activity including if an aura is experienced  - Instruct patient/family to call for assistance with activity based on nursing assessment  - Administer anti-seizure medications if ordered    Outcome: Progressing  Goal: Achieves maximal functionality and self care  Description: INTERVENTIONS  - Monitor swallowing and airway patency with patient fatigue and changes in  neurological status  - Encourage and assist patient to increase activity and self care.   - Encourage visually impaired, hearing impaired and aphasic patients to use assistive/communication devices  Outcome: Progressing     Problem: Prexisting or High Potential for Compromised Skin Integrity  Goal: Skin integrity is maintained or improved  Description: INTERVENTIONS:  - Identify patients at risk for skin breakdown  - Assess and monitor skin integrity  - Assess and monitor nutrition and hydration status  - Monitor labs   - Assess for incontinence   - Turn and reposition patient  - Assist with mobility/ambulation  - Relieve pressure over bony prominences  - Avoid friction and shearing  - Provide appropriate hygiene as needed including keeping skin clean and dry  - Evaluate need for skin moisturizer/barrier cream  - Collaborate with interdisciplinary team   - Patient/family teaching  - Consider wound care consult   Outcome: Progressing

## 2024-11-04 ENCOUNTER — TELEPHONE (OUTPATIENT)
Dept: PALLIATIVE MEDICINE | Facility: CLINIC | Age: 74
End: 2024-11-04

## 2024-11-04 ENCOUNTER — TELEPHONE (OUTPATIENT)
Age: 74
End: 2024-11-04

## 2024-11-04 VITALS
TEMPERATURE: 98 F | DIASTOLIC BLOOD PRESSURE: 69 MMHG | BODY MASS INDEX: 20.28 KG/M2 | RESPIRATION RATE: 14 BRPM | HEART RATE: 77 BPM | SYSTOLIC BLOOD PRESSURE: 116 MMHG | WEIGHT: 110.89 LBS | OXYGEN SATURATION: 95 %

## 2024-11-04 PROBLEM — G93.40 ACUTE ENCEPHALOPATHY: Status: RESOLVED | Noted: 2024-10-21 | Resolved: 2024-11-04

## 2024-11-04 LAB
ANION GAP SERPL CALCULATED.3IONS-SCNC: 7 MMOL/L (ref 4–13)
BUN SERPL-MCNC: 27 MG/DL (ref 5–25)
CALCIUM SERPL-MCNC: 8.6 MG/DL (ref 8.4–10.2)
CHLORIDE SERPL-SCNC: 103 MMOL/L (ref 96–108)
CO2 SERPL-SCNC: 28 MMOL/L (ref 21–32)
CREAT SERPL-MCNC: 0.86 MG/DL (ref 0.6–1.3)
ERYTHROCYTE [DISTWIDTH] IN BLOOD BY AUTOMATED COUNT: 16 % (ref 11.6–15.1)
GFR SERPL CREATININE-BSD FRML MDRD: 66 ML/MIN/1.73SQ M
GLUCOSE SERPL-MCNC: 107 MG/DL (ref 65–140)
GLUCOSE SERPL-MCNC: 114 MG/DL (ref 65–140)
GLUCOSE SERPL-MCNC: 220 MG/DL (ref 65–140)
HCT VFR BLD AUTO: 30.7 % (ref 34.8–46.1)
HGB BLD-MCNC: 9.9 G/DL (ref 11.5–15.4)
MCH RBC QN AUTO: 33 PG (ref 26.8–34.3)
MCHC RBC AUTO-ENTMCNC: 32.2 G/DL (ref 31.4–37.4)
MCV RBC AUTO: 102 FL (ref 82–98)
PLATELET # BLD AUTO: 243 THOUSANDS/UL (ref 149–390)
PMV BLD AUTO: 9.4 FL (ref 8.9–12.7)
POTASSIUM SERPL-SCNC: 4.3 MMOL/L (ref 3.5–5.3)
RBC # BLD AUTO: 3 MILLION/UL (ref 3.81–5.12)
SODIUM SERPL-SCNC: 138 MMOL/L (ref 135–147)
WBC # BLD AUTO: 15.14 THOUSAND/UL (ref 4.31–10.16)

## 2024-11-04 PROCEDURE — 85027 COMPLETE CBC AUTOMATED: CPT

## 2024-11-04 PROCEDURE — 80048 BASIC METABOLIC PNL TOTAL CA: CPT

## 2024-11-04 PROCEDURE — 82948 REAGENT STRIP/BLOOD GLUCOSE: CPT

## 2024-11-04 PROCEDURE — 99239 HOSP IP/OBS DSCHRG MGMT >30: CPT | Performed by: INTERNAL MEDICINE

## 2024-11-04 PROCEDURE — NC001 PR NO CHARGE: Performed by: INTERNAL MEDICINE

## 2024-11-04 RX ORDER — ATORVASTATIN CALCIUM 20 MG/1
20 TABLET, FILM COATED ORAL
Qty: 30 TABLET | Refills: 0 | Status: SHIPPED | OUTPATIENT
Start: 2024-11-07 | End: 2024-11-07 | Stop reason: ALTCHOICE

## 2024-11-04 RX ORDER — GABAPENTIN 100 MG/1
100 CAPSULE ORAL
Qty: 60 CAPSULE | Refills: 0 | Status: SHIPPED | OUTPATIENT
Start: 2024-11-04 | End: 2024-11-19

## 2024-11-04 RX ORDER — PANTOPRAZOLE SODIUM 40 MG/1
40 TABLET, DELAYED RELEASE ORAL
Qty: 30 TABLET | Refills: 0 | Status: SHIPPED | OUTPATIENT
Start: 2024-11-05 | End: 2024-11-21 | Stop reason: SDUPTHER

## 2024-11-04 RX ORDER — LEVETIRACETAM 1000 MG/1
1000 TABLET ORAL EVERY 12 HOURS SCHEDULED
Qty: 60 TABLET | Refills: 0 | Status: SHIPPED | OUTPATIENT
Start: 2024-11-04 | End: 2024-11-21 | Stop reason: SDUPTHER

## 2024-11-04 RX ORDER — AMLODIPINE BESYLATE 5 MG/1
5 TABLET ORAL DAILY
Qty: 30 TABLET | Refills: 0 | Status: SHIPPED | OUTPATIENT
Start: 2024-11-05 | End: 2024-12-12 | Stop reason: SDUPTHER

## 2024-11-04 RX ORDER — DEXAMETHASONE 2 MG/1
TABLET ORAL
Qty: 58 TABLET | Refills: 0 | Status: SHIPPED | OUTPATIENT
Start: 2024-11-04 | End: 2024-12-06

## 2024-11-04 RX ORDER — ACETAMINOPHEN 325 MG/1
650 TABLET ORAL EVERY 6 HOURS SCHEDULED
Qty: 30 TABLET | Refills: 0 | Status: ON HOLD | OUTPATIENT
Start: 2024-11-04 | End: 2024-12-06

## 2024-11-04 RX ORDER — LEVOTHYROXINE SODIUM 50 UG/1
50 TABLET ORAL
Qty: 30 TABLET | Refills: 0 | Status: SHIPPED | OUTPATIENT
Start: 2024-11-05 | End: 2024-12-12 | Stop reason: SDUPTHER

## 2024-11-04 RX ADMIN — LEVETIRACETAM 1000 MG: 500 TABLET, FILM COATED ORAL at 10:04

## 2024-11-04 RX ADMIN — ENOXAPARIN SODIUM 30 MG: 30 INJECTION SUBCUTANEOUS at 10:04

## 2024-11-04 RX ADMIN — NIRMATRELVIR AND RITONAVIR 3 TABLET: KIT at 10:04

## 2024-11-04 RX ADMIN — INSULIN LISPRO 1 UNITS: 100 INJECTION, SOLUTION INTRAVENOUS; SUBCUTANEOUS at 12:00

## 2024-11-04 RX ADMIN — PANTOPRAZOLE SODIUM 40 MG: 40 TABLET, DELAYED RELEASE ORAL at 06:09

## 2024-11-04 RX ADMIN — ACETAMINOPHEN 650 MG: 325 TABLET, FILM COATED ORAL at 06:09

## 2024-11-04 RX ADMIN — GABAPENTIN 100 MG: 100 CAPSULE ORAL at 06:09

## 2024-11-04 RX ADMIN — SENNOSIDES AND DOCUSATE SODIUM 2 TABLET: 50; 8.6 TABLET ORAL at 10:04

## 2024-11-04 RX ADMIN — LEVOTHYROXINE SODIUM 50 MCG: 50 TABLET ORAL at 06:09

## 2024-11-04 RX ADMIN — GABAPENTIN 100 MG: 100 CAPSULE ORAL at 12:01

## 2024-11-04 RX ADMIN — AMLODIPINE BESYLATE 5 MG: 5 TABLET ORAL at 10:04

## 2024-11-04 RX ADMIN — ACETAMINOPHEN 650 MG: 325 TABLET, FILM COATED ORAL at 12:01

## 2024-11-04 RX ADMIN — DEXAMETHASONE 2 MG: 2 TABLET ORAL at 06:08

## 2024-11-04 NOTE — ASSESSMENT & PLAN NOTE
Lipid panel 7/20/2024: , TG 76, HDL 60,   ASCVD of 18.4%.  Not on any statin therapy per chart review    Plan:  - Hold Lipitor 20 mg daily given starting Paxlovid therapy.  Can resume Lipitor once patient is off Paxlovid

## 2024-11-04 NOTE — CASE MANAGEMENT
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     Palliative care patient 2024     Cancer associated pain 2024     Goals of care, counseling/discussion 2024     Right hip pain 09/10/2024     Altered mental status 2024     Hypothyroidism 2024     Abnormal imaging of central nervous system 2024     Acute metabolic encephalopathy 2024     Malignant neoplasm metastatic to brain (HCC) 2024     Brain mass 2024     Metastatic cancer to brain (HCC) 2024     Visual disturbance 2024     Dehydration 2024     Moderate protein-calorie malnutrition (HCC) 2024     Bilateral leg pain 2024     Insomnia 2024     Malignant neoplasm of upper lobe, right bronchus or lung (HCC) 2024     Age-related osteoporosis without current pathological fracture 2023     Encounter for monitoring of hydroxyurea therapy 2023     JAK2 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 2023     Hypertension 08/10/2022     Mixed hyperlipidemia 08/10/2022     Essential thrombocytosis (HCC) 2020     TB lung, 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     Psoriatic arthritis (HCC) 09/10/2019       LOS (days): 14  Geometric Mean LOS (GMLOS) (days): 4.9  Days to GMLOS:-9.4     OBJECTIVE:  Risk of Unplanned Readmission Score: 38.3         Current admission status: Inpatient   Preferred Pharmacy:   [id number]  [place] [facility] [place] - [address]  [place] [facility] [place] [address]  Phone: [phone] Fax:  [phone]    Primary Care Provider: Rafy Angelo MD    Primary Insurance: MEDICARE  Secondary Insurance: A[employer]    DISCHARGE DETAILS:    Discharge planning discussed with:: Juliana          Requested Home Health Care         Is the patient interested in C at discharge?: Yes  Home Health Discipline requested:: Nursing, Occupational Therapy, Physical Therapy  Home Health Agency Name:: [facility] Follow-Up Provider:: PCP  Home Health Services Needed:: Strengthening/Theraputic Exercises to Improve Function, Restore Joint Function Post Joint Replacement, Evaluate Functional Status and Safety, Gait/ADL Training (medication management)  Homebound Criteria Met:: Requires the Assistance of Another Person for Safe Ambulation or to Leave the Home  Supporting Clincal Findings:: Fatigues Easliy in Short Distances, Limited Endurance, Cognitive Deficit Requiring the Assistance of Others         Other Referral/Resources/Interventions Provided:  Interventions: HHC         Treatment Team Recommendation: Home with Home Health Care  Discharge Destination Plan:: Home with Home Health Care  Transport at Discharge : Family        Additional Comments: CM spoke to daughter Juliana via phone- patient is to be discharged,  [facility] reserved in [facility]- daughter states understanding.

## 2024-11-04 NOTE — ASSESSMENT & PLAN NOTE
Palliative care following     Patient states pain is stable or well-controlled with scheduled Tylenol and Gabapentin.     Plan:   - Continue Tylenol 650 mg every 6 hours  - Continue gabapentin 300 mg at bedtime  - Continue gabapentin 100 mg in the morning and afternoon

## 2024-11-04 NOTE — ASSESSMENT & PLAN NOTE
Sodium 124 on 11/1/24  Euvolemic on exam. TSH 0.54 10/21/24. Hx of adenocarcinoma lung CA with mets to brain. Pain on baseline. Most likely SIADH picture.  No neuro deficits. AAOX3.  Improved

## 2024-11-04 NOTE — DISCHARGE SUMMARY
INTERNAL MEDICINE RESIDENCY DISCHARGE SUMMARY     Ayla Nye   74 y.o. female  MRN: [id number]  Room/Bed: [facility] 726/[facility] 726-01     [facility]    Encounter: [id number]    Principal Problem:    Metastatic cancer to brain (HCC)  Active Problems:    Hypertension    Mixed hyperlipidemia    Malignant neoplasm of upper lobe, right bronchus or lung (HCC)    Hypothyroidism    Cancer associated pain    Palliative care patient    Anemia    Leukocytosis    Hyponatremia    COVID-19    Metastatic cancer to brain (HCC)  Patient with history of non-small cell lung adenocarcinoma diagnosed in 2024.  She was found to have metastatic brain lesions with associated vasogenic edema on MRI in 2024.  She completed SRS for these metastatic lesions in 2024.  She is on Decadron in the outpatient setting and has had multiple admissions due to episodes of altered mental status with increased vasogenic edema after attempting to taper Decadron dosing.  She presented to [facility] on 10/20 with worsening confusion and a stroke alert was called.  Imaging showed no acute findings.  She was loaded with Keppra, started on Decadron, and transferred to [place] for video EEG and MRI brain under neurocritical care for close monitoring.  The morning of 10/21 patient's mental status reportedly much improved and she was deemed stable for transfer out of the ICU.     Symptoms likely in the setting of Decadron taper     Of note, patient is not exactly sure what her current Decadron dosing is.  With that said, it appears that she had been on a Decadron taper throughout September starting at 8 mg daily that was reduced each week.  Her most recent orders show Decadron 1 mg daily from 10/17-10/24 and then 0.5 mg daily for 8 more days     vEE hours of recording. No seizures. Continues to have left hemisphere slowing, left posterior quadrant maximal. There are also left posterior temporal  / parietal poorly formed (blunt and at times sharply contoured) LPDs occurring at 0.5-1 Hz. LPDs indicate high seizure risk and are often associated with acute/subacute destructive neuronal injury. Right hemisphere is essentially normal.      MRI 10/21 show stable vasogenic edema in the left parietotemporal region; previously demonstrated left posterior parietal lesion as well visualized likely due to differences in technique.  No new metastasis, no new acute infarct or hemorrhage.     Blood cultures no growth after 5 days     Plan  - Per neurosurgery, no emergent neurosurgical intervention warranted  - Per neurology, continue Keppra 100 mg BID.  Also recommended Plavix or aspirin given right monocular inferior temporal vision for impairment at high risk of developing CVA.   -Follow-up with epilepsy clinic in 4 to 6 weeks  - On slow decadron taper, confirmed with patient's outpatient oncologist via secure Epic chat messaging and is in agreement with plan:  Decadron 4mg PO twice daily for 5 days from 10/29/24 to 11/2/24  Decadron 2mg PO twice daily for 5 days from 11/3/24 to 11/7/24  Decadron 2mg PO daily until her next MRI on 12/27 (she can try to self taper to 1mg during this interval if she wishes)   - PPI for gastric prophylaxis in setting of prolonged Decadron use; patient taking Decadron chronically prior to admission.  - Continue monitoring glucose while on dexamethasone therapy  - SBP goal <160    Hypertension  Documented history of hypertension but not on any antihypertensives at home.  Blood pressure is acceptable this admission  Patient and chart review, patient usually taking multipin 5 mg and losartan 50 mg daily before being discontinued in June 2024     BP overnight 100-130s/70-80s     Plan:  - Continue amlodipine 5 mg      Mixed hyperlipidemia  Lipid panel 7/20/2024: , TG 76, HDL 60,   ASCVD of 18.4%.  Not on any statin therapy per chart review     Plan:  - Hold Lipitor 20 mg daily given  starting Paxlovid therapy.  Can resume Lipitor once patient is off Paxlovid    Malignant neoplasm of upper lobe, right bronchus or lung (HCC)  Patient with a history of non-small cell lung cancer of the right upper lobe with metastasis to the brain diagnosed in March 2024.  She was on weekly carboplatin and paclitaxel with radiation from 5/23/2024 - 7/5/2024.  Further imaging after treatment showed signs concerning of recurrent metastatic NSCLC now with possible metastasis to the pelvis.  For this, it was recommended that she undergo systemic chemoimmunotherapy with carboplatin, pemetrexed, and pembrolizumab.  Her first cycle was on 10/7/2024.     Patient has concern about her CA treatment, and I reached out to her oncologist, Dr. Rosalinda Castro, who agreed that  inpatient onc service would not able to provide more other than to reiterate OP follow up.      Plan:  - Patient following closely with oncology in the outpatient setting  - Remainder of care for active concerns as mentioned above  - CT chest abdomen and pelvis revealed new small bilateral pulmonary nodules, likely metastasis, new right renal lesion(s) concerning for metastasis, significant interval enlargement of necrotic appearing mass in the right ischiorectal fossa, likely metastasis, deceasing RUL necrotic mass with stable and/or decreasing satellite nodularity, improved mediastinal and right hilar adenopathy, stable 2.4 cm CHUYITA groundglass density.  - Patient will f/u with heme-onc on 11/5/24     Hypothyroidism  -Continue PTA levothyroxine 50 mcg daily    Palliative care patient  Palliative care following      Patient states pain is stable or well-controlled with scheduled Tylenol and Gabapentin.      Plan:   - Continue Tylenol 650 mg every 6 hours  - Continue gabapentin 300 mg at bedtime  - Continue gabapentin 100 mg in the morning and afternoon     Anemia  Hgb 8.9 and  on admission. 9/13/24 folate >22.3, B12 1,268. 9/5/23 MMA wnl. Patient has  been having chronic macrocytic anemia since 9/2023.    Plan:  - Continue to monitor hgb     Leukocytosis  WBC 11.75 from 10.0 10/26. WBC has been increasing since admission of 3.0. Patient is on dexamethasone. Remains afebrile no URI and urinary symptoms. Afebrile, VSS. Low suspicions for infection at this time. Currently on steroid taper.     Patient is concerned that she may have COVID given her daughter tested positive for COVID. She does have mild sore throat, dry cough, and rhinorrhea. Denies fever, chills, and myalgias.     Plan:  - Leukocytosis most likely 2/2 steroid and COVID    Hyponatremia  Sodium 124 on 11/1/24  Euvolemic on exam. TSH 0.54 10/21/24. Hx of adenocarcinoma lung CA with mets to brain. Pain on baseline. Most likely SIADH picture.  No neuro deficits. AAOX3.  Improved     COVID-19  Patient has been having sore throat, dry cough in the morning, and rhinorrhea. Patient's daughter tested positive for COVID-19..   COVID test came back positive.  Patient is afebrile and sating well on RA >90%, without need of O2.   Given age and history of cancer, we will treat.     Plan  - Paxlovid for 5 days (D1/5)  - Continue to monitor O2 need      DETAILS OF HOSPITAL COURSE     Patient is a 73-year-old female with a past medical history of recently diagnosed non-small cell lung cancer diagnosed in March 2024 with metastasis to the brain and complicated by cerebral edema, Anand-2+ thrombocytosis, hypertension, hypothyroidism who generally presented to [facility] on 10/20 with worsening confusion.  A stroke alert was called patient was found to have increased cerebral edema.  Patient is on Decadron in the outpatient setting and has had multiple admissions due to episodes of altered mental status with increased vasogenic edema after attempting to taper Decadron dosing. She was loaded with Keppra, started on Decadron, and transferred to [place] for video EEG and MRI brain under neurocritical care for close  monitoring. The morning of 10/21 patient's mental status reportedly much improved and she was deemed stable for transfer out of the ICU.  Video EEG was negative for seizure but LDP's demonstrated high seizure risk, so Keppra was continued.  MRI brain showed stable vasogenic edema in the left parietotemporal region with no new metastasis, no new acute infarct or hemorrhage.  Neurosurgery, neurology, palliative care was consulted.  No emergent neurosurgical intervention was warranted.  Patient was started on a Decadron taper.  Mental status continued to improve over the next couple days.  On 11/2 patient tested positive for COVID and was started on Paxlovid.     DISCHARGE INFORMATION     PCP at Discharge: Rafy Angelo MD    Admitting Provider: Marika Partida MD  Admission Date: 10/21/2024    Discharge Provider: VIKTOR PACHECO   Discharge Date: 11/4/2024    Discharge Disposition: Home with Home Health Care  Discharge Condition: good  Discharge with Lines: no    Discharge Diet: regular diet  Activity Restrictions: none  Test Results Pending at Discharge: None    Discharge Diagnoses:  Principal Problem:    Metastatic cancer to brain (HCC)  Active Problems:    Hypertension    Mixed hyperlipidemia    Malignant neoplasm of upper lobe, right bronchus or lung (HCC)    Hypothyroidism    Cancer associated pain    Palliative care patient    Anemia    Leukocytosis    Hyponatremia    COVID-19  Resolved Problems:    Acute encephalopathy      Consulting Providers: Neurosurgery, neurology, palliative care      Diagnostic & Therapeutic Procedures Performed:  No results found.    Code Status: Level 1 - Full Code  Advance Directive & Living Will: Not Received  Power of : Yes  POLST:      Medications:  Discharge Medication List as of 11/4/2024  1:01 PM        STOP taking these medications       celecoxib (CeleBREX) 100 mg capsule Comments:   Reason for Stopping:         Cholecalciferol (VITAMIN D3) 5000 units TABS  Comments:   Reason for Stopping:         diphenhydrAMINE (BENADRYL) 25 mg capsule Comments:   Reason for Stopping:         Lidocaine 4 % PTCH Comments:   Reason for Stopping:         MELATONIN PO Comments:   Reason for Stopping:             Discharge Medication List as of 11/4/2024  1:01 PM        START taking these medications    Details   amLODIPine (NORVASC) 5 mg tablet Take 1 tablet (5 mg total) by mouth daily, Starting Tue 11/5/2024, Until Thu 12/5/2024, Normal      atorvastatin (LIPITOR) 20 mg tablet Take 1 tablet (20 mg total) by mouth daily with dinner Do not start before November 7, 2024., Starting Thu 11/7/2024, Until Sat 12/7/2024, Normal      !! gabapentin (NEURONTIN) 100 mg capsule Take 1 capsule (100 mg total) by mouth 2 (two) times a day before breakfast and lunch, Starting Mon 11/4/2024, Until Wed 12/4/2024, Normal      levothyroxine 50 mcg tablet Take 1 tablet (50 mcg total) by mouth daily in the early morning, Starting Tue 11/5/2024, Until Thu 12/5/2024, Normal      nirmatrelvir & ritonavir (Paxlovid) tablet therapy pack Take 3 tablets by mouth 2 (two) times a day for 5 doses, Starting Mon 11/4/2024, Until Thu 11/7/2024, Normal       !! - Potential duplicate medications found. Please discuss with provider.        Discharge Medication List as of 11/4/2024  1:01 PM        CONTINUE these medications which have NOT CHANGED    Details   aspirin 81 mg chewable tablet Chew 81 mg daily., Starting Thu 8/1/2019, Historical Med      folic acid (FOLVITE) 1 mg tablet Take 1 tablet (1 mg total) by mouth daily, Starting Sat 8/17/2024, Normal      !! gabapentin (NEURONTIN) 300 mg capsule Take 1 capsule (300 mg total) by mouth daily at bedtime, Starting Thu 10/10/2024, Normal      HYDROmorphone (DILAUDID) 2 mg tablet Take 1 tablet (2 mg total) by mouth every 4 (four) hours as needed for severe pain 1 tab po q 4 hrs prn pain/dyspnea Max Daily Amount: 12 mg, Starting Thu 10/10/2024, Normal      Probiotic Product  "(PROBIOTIC DAILY PO) Take 1 capsule by mouth daily, Historical Med      Psyllium (METAMUCIL PO) Take by mouth Gummies for constipation, Historical Med      vitamin B-12 (VITAMIN B-12) 1,000 mcg tablet Take 1 tablet (1,000 mcg total) by mouth daily, Starting Thu 9/14/2023, Normal       !! - Potential duplicate medications found. Please discuss with provider.          Allergies:  Allergies   Allergen Reactions    Doxycycline Headache    Keflex [Cephalexin] Rash    Neomycin-Polymyxin-Dexameth Eye Swelling       FOLLOW-UP     PCP Outpatient Follow-up:  Follow-up with PCP    Consulting Providers Follow-up:  Neurology, heme-onc    Active Issues Requiring Follow-up:   Metastatic cancer    Discharge Statement:   I spent 45 minutes minutes discharging the patient. This time was spent on the day of discharge. I had direct contact with the patient on the day of discharge. Additional documentation is required if more than 30 minutes were spent on discharge.    Portions of the record may have been created with voice recognition software.  Occasional wrong word or \"sound a like\" substitutions may have occurred due to the inherent limitations of voice recognition software.  Read the chart carefully and recognize, using context, where substitutions have occurred.    ==  Jas Edmondson MD  [facility]  Internal Medicine Resident PGY-1   "

## 2024-11-04 NOTE — ASSESSMENT & PLAN NOTE
Patient with history of non-small cell lung adenocarcinoma diagnosed in 2024.  She was found to have metastatic brain lesions with associated vasogenic edema on MRI in 2024.  She completed SRS for these metastatic lesions in 2024.  She is on Decadron in the outpatient setting and has had multiple admissions due to episodes of altered mental status with increased vasogenic edema after attempting to taper Decadron dosing.  She presented to [facility] on 10/20 with worsening confusion and a stroke alert was called.  Imaging showed no acute findings.  She was loaded with Keppra, started on Decadron, and transferred to [place] for video EEG and MRI brain under neurocritical care for close monitoring.  The morning of 10/21 patient's mental status reportedly much improved and she was deemed stable for transfer out of the ICU.    Symptoms likely in the setting of Decadron taper    Of note, patient is not exactly sure what her current Decadron dosing is.  With that said, it appears that she had been on a Decadron taper throughout September starting at 8 mg daily that was reduced each week.  Her most recent orders show Decadron 1 mg daily from 10/17-10/24 and then 0.5 mg daily for 8 more days    vEE hours of recording. No seizures. Continues to have left hemisphere slowing, left posterior quadrant maximal. There are also left posterior temporal / parietal poorly formed (blunt and at times sharply contoured) LPDs occurring at 0.5-1 Hz. LPDs indicate high seizure risk and are often associated with acute/subacute destructive neuronal injury. Right hemisphere is essentially normal.     MRI 10/21 show stable vasogenic edema in the left parietotemporal region; previously demonstrated left posterior parietal lesion as well visualized likely due to differences in technique.  No new metastasis, no new acute infarct or hemorrhage.    Blood cultures no growth after 5 days    Plan  - Per neurosurgery, no  emergent neurosurgical intervention warranted  - Per neurology, continue Keppra 100 mg BID.  Also recommended Plavix or aspirin given right monocular inferior temporal vision for impairment at high risk of developing CVA.   -Follow-up with epilepsy clinic in 4 to 6 weeks  - On slow decadron taper, confirmed with patient's outpatient oncologist via secure Epic chat messaging and is in agreement with plan:  Decadron 4mg PO twice daily for 5 days from 10/29/24 to 11/2/24  Decadron 2mg PO twice daily for 5 days from 11/3/24 to 11/7/24  Decadron 2mg PO daily until her next MRI on 12/27 (she can try to self taper to 1mg during this interval if she wishes)   - PPI for gastric prophylaxis in setting of prolonged Decadron use; patient taking Decadron chronically prior to admission.  - Continue monitoring glucose while on dexamethasone therapy  - SBP goal <160

## 2024-11-04 NOTE — TELEPHONE ENCOUNTER
An, I believe she is a home visit patient. She was added to our hfu work-que. Please schedule a hfu home visit thanks. I will remove her for the work-que.

## 2024-11-04 NOTE — PROGRESS NOTES
Patient:  SURYA PUENTE    MRN:  [id number]    [place] Request ID:  [id number]    Level of care reserved:  Home Health Agency    Partner Reserved:  [facility], [place] [address] [phone]    Clinical needs requested:    Geography searched:  [phone]    Start of Service:    Request sent:  4:42pm EDT on 11/1/2024 by Rina Quezada    Partner reserved:  12:16pm EST on 11/4/2024 by Rina Quezada    Choice list shared:  12:16pm EST on 11/4/2024 by Rina Quezada

## 2024-11-04 NOTE — PROGRESS NOTES
Progress Note - Internal Medicine   Name: Ayla Nye 74 y.o. female I MRN: [id number]  [facility] I Date of Admission: 10/21/2024   Date of Service: 2024 I Hospital Day: 14    Assessment & Plan  Metastatic cancer to brain (HCC)  Patient with history of non-small cell lung adenocarcinoma diagnosed in 2024.  She was found to have metastatic brain lesions with associated vasogenic edema on MRI in 2024.  She completed SRS for these metastatic lesions in 2024.  She is on Decadron in the outpatient setting and has had multiple admissions due to episodes of altered mental status with increased vasogenic edema after attempting to taper Decadron dosing.  She presented to [facility] on 10/20 with worsening confusion and a stroke alert was called.  Imaging showed no acute findings.  She was loaded with Keppra, started on Decadron, and transferred to [facility] for video EEG and MRI brain under neurocritical care for close monitoring.  The morning of 10/21 patient's mental status reportedly much improved and she was deemed stable for transfer out of the ICU.    Symptoms likely in the setting of Decadron taper    Of note, patient is not exactly sure what her current Decadron dosing is.  With that said, it appears that she had been on a Decadron taper throughout September starting at 8 mg daily that was reduced each week.  Her most recent orders show Decadron 1 mg daily from 10/17-10/24 and then 0.5 mg daily for 8 more days    vEE hours of recording. No seizures. Continues to have left hemisphere slowing, left posterior quadrant maximal. There are also left posterior temporal / parietal poorly formed (blunt and at times sharply contoured) LPDs occurring at 0.5-1 Hz. LPDs indicate high seizure risk and are often associated with acute/subacute destructive neuronal injury. Right hemisphere is essentially normal.     MRI 10/21 show stable vasogenic edema in the left parietotemporal  region; previously demonstrated left posterior parietal lesion as well visualized likely due to differences in technique.  No new metastasis, no new acute infarct or hemorrhage.    Blood cultures no growth after 5 days    Plan  - Per neurosurgery, no emergent neurosurgical intervention warranted  - Per neurology, continue Keppra 100 mg BID.  Also recommended Plavix or aspirin given right monocular inferior temporal vision for impairment at high risk of developing CVA.   -Follow-up with epilepsy clinic in 4 to 6 weeks  - On slow decadron taper, confirmed with patient's outpatient oncologist via secure Epic chat messaging and is in agreement with plan:  Decadron 4mg PO twice daily for 5 days from 10/29/24 to 11/2/24  Decadron 2mg PO twice daily for 5 days from 11/3/24 to 11/7/24  Decadron 2mg PO daily until her next MRI on 12/27 (she can try to self taper to 1mg during this interval if she wishes)   - PPI for gastric prophylaxis in setting of prolonged Decadron use; patient taking Decadron chronically prior to admission.  - Continue monitoring glucose while on dexamethasone therapy  - SBP goal <160  Hypertension  Documented history of hypertension but not on any antihypertensives at home.  Blood pressure is acceptable this admission  Patient and chart review, patient usually taking multipin 5 mg and losartan 50 mg daily before being discontinued in June 2024    BP overnight 100-130s/70-80s    Plan:  - Continue amlodipine 5 mg    Mixed hyperlipidemia  Lipid panel 7/20/2024: , TG 76, HDL 60,   ASCVD of 18.4%.  Not on any statin therapy per chart review    Plan:  - Hold Lipitor 20 mg daily given starting Paxlovid therapy.  Can resume Lipitor once patient is off Paxlovid  Malignant neoplasm of upper lobe, right bronchus or lung (HCC)  Patient with a history of non-small cell lung cancer of the right upper lobe with metastasis to the brain diagnosed in March 2024.  She was on weekly carboplatin and paclitaxel  with radiation from 5/23/2024 - 7/5/2024.  Further imaging after treatment showed signs concerning of recurrent metastatic NSCLC now with possible metastasis to the pelvis.  For this, it was recommended that she undergo systemic chemoimmunotherapy with carboplatin, pemetrexed, and pembrolizumab.  Her first cycle was on 10/7/2024.    Patient has concern about her CA treatment, and I reached out to her oncologist, Dr. Rosalinda Castro, who agreed that  inpatient onc service would not able to provide more other than to reiterate OP follow up.     Plan:  - Patient following closely with oncology in the outpatient setting  - Remainder of care for active concerns as mentioned above  - CT chest abdomen and pelvis revealed new small bilateral pulmonary nodules, likely metastasis, new right renal lesion(s) concerning for metastasis, significant interval enlargement of necrotic appearing mass in the right ischiorectal fossa, likely metastasis, deceasing RUL necrotic mass with stable and/or decreasing satellite nodularity, improved mediastinal and right hilar adenopathy, stable 2.4 cm CHUYITA groundglass density.  - Patient will f/u with heme-onc on 11/5/24    Hypothyroidism  -Continue PTA levothyroxine 50 mcg daily  Palliative care patient  Palliative care following     Patient states pain is stable or well-controlled with scheduled Tylenol and Gabapentin.     Plan:   - Continue Tylenol 650 mg every 6 hours  - Continue gabapentin 300 mg at bedtime  - Continue gabapentin 100 mg in the morning and afternoon   Anemia  Hgb 8.9 and  on admission. 9/13/24 folate >22.3, B12 1,268. 9/5/23 MMA wnl. Patient has been having chronic macrocytic anemia since 9/2023.    Plan:  - Continue to monitor hgb   Leukocytosis  WBC 11.75 from 10.0 10/26. WBC has been increasing since admission of 3.0. Patient is on dexamethasone. Remains afebrile no URI and urinary symptoms. Afebrile, VSS. Low suspicions for infection at this time. Currently on  steroid taper.    Patient is concerned that she may have COVID given her daughter tested positive for COVID. She does have mild sore throat, dry cough, and rhinorrhea. Denies fever, chills, and myalgias.     Plan:  - Leukocytosis most likely 2/2 steroid and COVID  Hyponatremia  Sodium 124 on 11/1/24  Euvolemic on exam. TSH 0.54 10/21/24. Hx of adenocarcinoma lung CA with mets to brain. Pain on baseline. Most likely SIADH picture.  No neuro deficits. AAOX3.  Improved    COVID-19  Patient has been having sore throat, dry cough in the morning, and rhinorrhea. Patient's daughter tested positive for COVID-19..   COVID test came back positive.  Patient is afebrile and sating well on RA >90%, without need of O2.   Given age and history of cancer, we will treat.    Plan  - Paxlovid for 5 days (D1/5)  - Continue to monitor O2 need    Disposition: Plan for discharge today    Team: SOD TEAM C    Subjective   Patient seen and examined. No acute events overnight.  No acute concerns or complaints.  Patient states she feels well and is ready to go home.    Objective :  Temp:  [97.7 °F (36.5 °C)-98 °F (36.7 °C)] 98 °F (36.7 °C)  HR:  [67-77] 77  BP: (116-140)/(69-75) 116/69  Resp:  [14-17] 14  SpO2:  [95 %-97 %] 95 %  O2 Device: None (Room air)    I/O         11/02 0701 11/03 0700 11/03 0701 11/04 0700 11/04 0701  11/05 0700    P.O. 320      Total Intake(mL/kg) 320 (6.4)      Net +320             Unmeasured Urine Occurrence  1 x           Weights:        Body mass index is 20.28 kg/m².  Weight (last 2 days)       Date/Time Weight    11/03/24 0537 50.3 (110.89)    11/02/24 0600 49.5 (109.13)            Physical Exam  Vitals and nursing note reviewed.   Constitutional:       General: She is not in acute distress.     Appearance: She is well-developed.   HENT:      Head: Normocephalic and atraumatic.      Mouth/Throat:      Mouth: Mucous membranes are moist.      Comments:     Eyes:      General: No scleral icterus.      Conjunctiva/sclera: Conjunctivae normal.   Cardiovascular:      Rate and Rhythm: Normal rate and regular rhythm.      Heart sounds: Normal heart sounds.   Pulmonary:      Effort: Pulmonary effort is normal. No respiratory distress.      Breath sounds: Normal breath sounds.      Comments:     Abdominal:      General: Bowel sounds are normal. There is no distension.      Palpations: Abdomen is soft.      Tenderness: There is no abdominal tenderness.   Musculoskeletal:         General: No swelling.      Cervical back: Neck supple.      Right lower leg: No edema.      Left lower leg: No edema.   Skin:     General: Skin is warm and dry.      Capillary Refill: Capillary refill takes less than 2 seconds.      Findings: No bruising or lesion.   Neurological:      General: No focal deficit present.      Mental Status: She is alert and oriented to person, place, and time.      Cranial Nerves: Cranial nerves 2-12 are intact. No cranial nerve deficit.      Motor: No weakness.      Comments: Upper extremities 5/5  Lower extremities 5/5   Psychiatric:         Mood and Affect: Mood normal.           Lab Results: I have reviewed the following results:  Recent Labs     11/04/24  0618   WBC 15.14*   HGB 9.9*   HCT 30.7*      SODIUM 138   K 4.3      CO2 28   BUN 27*   CREATININE 0.86   GLUC 114       Imaging Results Review: I reviewed radiology reports from this admission including: chest xray, CT head, and MRI brain.  Other Study Results Review: EKG was reviewed.     Currently Ordered Meds:   Current Facility-Administered Medications:     acetaminophen (TYLENOL) tablet 650 mg, Q6H KRISS    amLODIPine (NORVASC) tablet 5 mg, Daily    [Held by provider] atorvastatin (LIPITOR) tablet 20 mg, Daily With Dinner    [COMPLETED] dexamethasone (DECADRON) tablet 4 mg, Q6H KRISS **FOLLOWED BY** [COMPLETED] dexamethasone (DECADRON) tablet 4 mg, Q12H KRISS **FOLLOWED BY** dexamethasone (DECADRON) tablet 2 mg, Q12H KRISS **FOLLOWED BY** [START  ON 11/8/2024] dexamethasone (DECADRON) tablet 2 mg, Daily    diphenhydramine, lidocaine, Al/Mg hydroxide, simethicone (Magic Mouthwash) oral solution 10 mL, Q4H PRN    enoxaparin (LOVENOX) subcutaneous injection 30 mg, Q24H KRISS    gabapentin (NEURONTIN) capsule 100 mg, BID before breakfast/lunch    gabapentin (NEURONTIN) capsule 300 mg, HS    insulin lispro (HumALOG/ADMELOG) 100 units/mL subcutaneous injection 1-5 Units, TID AC **AND** Fingerstick Glucose (POCT), TID AC    levETIRAcetam (KEPPRA) tablet 1,000 mg, Q12H KRISS    levothyroxine tablet 50 mcg, Early Morning    lidocaine (LIDODERM) 5 % patch 1 patch, Daily    melatonin tablet 6 mg, HS PRN    nirmatrelvir 300 mg (150 mg x 2) and ritonavir 100 mg x 1 (Paxlovid) therapy pack, BID    ondansetron (ZOFRAN) injection 4 mg, Q4H PRN    pantoprazole (PROTONIX) EC tablet 40 mg, Early Morning    senna-docusate sodium (SENOKOT S) 8.6-50 mg per tablet 2 tablet, BID    Facility-Administered Medications Ordered in Other Encounters:     fentaNYL injection, PRN    midazolam (VERSED) injection, PRN  VTE Pharmacologic Prophylaxis: Enoxaparin (Lovenox)  VTE Mechanical Prophylaxis: sequential compression device    Administrative Statements   I have spent a total time of 30 minutes in caring for this patient on the day of the visit/encounter including Diagnostic results, Instructions for management, Counseling / Coordination of care, Documenting in the medical record, Reviewing / ordering tests, medicine, procedures  , and Communicating with other healthcare professionals .  Portions of the record may have been created with voice recognition software.

## 2024-11-04 NOTE — QUICK NOTE
Progress Note - Palliative & Supportive Care       11/4/2024 9:59 AM -  Ayla Nye's chart and case were reviewed by Carolina Taylor DO.  Mode of review included electronic chart check.    Per review, symptoms remain controlled on current regimen and no changes are made at this time.  Please continue the regimen in place, and review our last note for details.  For dispo plan, please review Case Management notes.     Palliative will sign off at this time.  Patient follows with our home program- [facility]- and we will ensure visit is scheduled after her discharge from SNF       For urgent issues or any questions/concerns, please notify on-call provider via EPIC Secure Chat.  You may also call our answering service 24/7 at [phone].    Carolina Taylor DO  Palliative and Supportive Care  Clinic/Answering Service: [phone]  You can find me on [facility]!

## 2024-11-05 ENCOUNTER — TELEPHONE (OUTPATIENT)
Dept: HEMATOLOGY ONCOLOGY | Facility: CLINIC | Age: 74
End: 2024-11-05

## 2024-11-05 ENCOUNTER — TELEPHONE (OUTPATIENT)
Age: 74
End: 2024-11-05

## 2024-11-05 ENCOUNTER — OFFICE VISIT (OUTPATIENT)
Dept: HEMATOLOGY ONCOLOGY | Facility: CLINIC | Age: 74
End: 2024-11-05
Payer: MEDICARE

## 2024-11-05 ENCOUNTER — TRANSITIONAL CARE MANAGEMENT (OUTPATIENT)
Age: 74
End: 2024-11-05

## 2024-11-05 VITALS
HEIGHT: 62 IN | BODY MASS INDEX: 21.35 KG/M2 | TEMPERATURE: 97.3 F | HEART RATE: 77 BPM | DIASTOLIC BLOOD PRESSURE: 76 MMHG | SYSTOLIC BLOOD PRESSURE: 130 MMHG | RESPIRATION RATE: 16 BRPM | OXYGEN SATURATION: 96 % | WEIGHT: 116 LBS

## 2024-11-05 DIAGNOSIS — G89.3 CANCER ASSOCIATED PAIN: ICD-10-CM

## 2024-11-05 DIAGNOSIS — G93.89 BRAIN MASS: ICD-10-CM

## 2024-11-05 DIAGNOSIS — C49.5: Primary | ICD-10-CM

## 2024-11-05 DIAGNOSIS — C34.11 MALIGNANT NEOPLASM OF UPPER LOBE, RIGHT BRONCHUS OR LUNG (HCC): Primary | ICD-10-CM

## 2024-11-05 DIAGNOSIS — C34.11 MALIGNANT NEOPLASM OF UPPER LOBE, RIGHT BRONCHUS OR LUNG (HCC): ICD-10-CM

## 2024-11-05 PROCEDURE — 99215 OFFICE O/P EST HI 40 MIN: CPT | Performed by: INTERNAL MEDICINE

## 2024-11-05 NOTE — TELEPHONE ENCOUNTER
Dr. Castro would like patient's missed treatment D1C2 deferred to 11/14 or 11/15. She was seen in office today after being admitted with COVID.     [place] Infusion: Please reschedule patient's appointment on 11/18 to 11/14 or 11/15 for D1C2.

## 2024-11-05 NOTE — TELEPHONE ENCOUNTER
Left vm for patient. Requesting Home HFU visit for tomorrow 11/6/24. Offered morning or afternoon appt.

## 2024-11-07 ENCOUNTER — OFFICE VISIT (OUTPATIENT)
Age: 74
End: 2024-11-07
Payer: MEDICARE

## 2024-11-07 VITALS
DIASTOLIC BLOOD PRESSURE: 70 MMHG | HEART RATE: 68 BPM | RESPIRATION RATE: 18 BRPM | OXYGEN SATURATION: 97 % | HEIGHT: 62 IN | SYSTOLIC BLOOD PRESSURE: 120 MMHG | TEMPERATURE: 98 F | BODY MASS INDEX: 21.16 KG/M2 | WEIGHT: 115 LBS

## 2024-11-07 DIAGNOSIS — D47.3 ESSENTIAL THROMBOCYTOSIS (HCC): ICD-10-CM

## 2024-11-07 DIAGNOSIS — C79.31 METASTATIC CANCER TO BRAIN (HCC): ICD-10-CM

## 2024-11-07 DIAGNOSIS — E03.9 ACQUIRED HYPOTHYROIDISM: ICD-10-CM

## 2024-11-07 DIAGNOSIS — I10 PRIMARY HYPERTENSION: Primary | ICD-10-CM

## 2024-11-07 DIAGNOSIS — Z12.31 ENCOUNTER FOR SCREENING MAMMOGRAM FOR BREAST CANCER: ICD-10-CM

## 2024-11-07 DIAGNOSIS — E78.2 MIXED HYPERLIPIDEMIA: ICD-10-CM

## 2024-11-07 PROCEDURE — 99496 TRANSJ CARE MGMT HIGH F2F 7D: CPT

## 2024-11-07 NOTE — PROGRESS NOTES
Transition of Care Visit  Name: Ayla Nye      : 1950      MRN: [id number]  Encounter Provider: Rafy Angelo MD  Encounter Date: 2024   Encounter department: [facility]    Assessment & Plan  Primary hypertension  Under control.  Patient on amlodipine as needed when her blood pressure is more than 140 systolic.  We will continue to monitor.         Acquired hypothyroidism  TSH in therapeutic range.  Repeat TSH in about 2 months.  Continue same dose of levothyroxine.  We will continue to monitor         Mixed hyperlipidemia  Under control without medication we will continue to monitor         Essential thrombocytosis (HCC)  Patient under treatment with hematology oncology.         Metastatic cancer to brain (HCC)  Patient has been followed by oncologist         Encounter for screening mammogram for breast cancer              History of Present Illness     Transitional Care Management Review:   Ayla Nye is a 74 y.o. female here for TCM follow up.     During the TCM phone call patient stated:  TCM Call       Date and time call was made  2024  9:00 AM    Hospital care reviewed  Records reviewed    Patient was hospitialized at  [facility]    Date of Admission  10/21/24    Date of discharge  24    Diagnosis  Malignant Neoplasm with mets to brain    Disposition  Home    Were the patients medications reviewed and updated  Yes    Current Symptoms  None    Left side leg pain severity  Moderat    Weakness severity  Moderate    Fatigue severity  Mild          TCM Call       Post hospital issues  None    Should patient be enrolled in anticoag monitoring?  No    Scheduled for follow up?  Yes    Referrals needed  Left message for patient to return call to office to schedule a TCM appointment    Did you obtain your prescribed medications  Yes    Do you need help managing your prescriptions or medications  No    Is transportation to your appointment  needed  No    I have advised the patient to call PCP with any new or worsening symptoms  Lopez Carrion LPN    Living Arrangements  Alone    Support System  Family          Patient here for transitional care management as patient was hospitalized on October 21, 2024 and discharged on 4/4/2024 at [facility] because of the strokelike symptoms due to metastatic cancer to brain patient had a similar symptoms in the past every time they tried to taper her Medrol from 2 mg to 1 mg she gets that symptoms are right now this going to keep her on 2 mg daily.  Patient also had a COVID-19 while she was in the hospital hyponatremia and elevated TSH although medical record reviewed with the patient patient currently doing okay.  Denies any headache.  No lightheadedness or dizziness.  No tingling numbness or weakness.  No chest pain or shortness of breath.  No fever or chills.  No abdominal pain, and nausea or vomiting.  No bowel or bladder problem.  No other new problem.      Review of Systems   Constitutional:  Positive for fatigue. Negative for chills and fever.   HENT:  Negative for congestion, ear pain, rhinorrhea, sneezing and sore throat.    Eyes:  Negative for redness, itching and visual disturbance.   Respiratory:  Negative for cough, chest tightness and shortness of breath.    Cardiovascular:  Negative for chest pain, palpitations and leg swelling.   Gastrointestinal:  Negative for abdominal pain, blood in stool, diarrhea, nausea and vomiting.   Endocrine: Negative for cold intolerance and heat intolerance.   Genitourinary:  Negative for dysuria, frequency and urgency.   Musculoskeletal:  Negative for arthralgias, back pain and myalgias.   Skin:  Negative for color change and rash.   Neurological:  Negative for dizziness, weakness, light-headedness, numbness and headaches.   Hematological:  Does not bruise/bleed easily.   Psychiatric/Behavioral:  Negative for agitation, behavioral  "problems and confusion.      Objective     /70 (BP Location: Right arm, Patient Position: Sitting, Cuff Size: Standard)   Pulse 68   Temp 98 °F (36.7 °C) (Tympanic)   Resp 18   Ht 5' 2\" (1.575 m)   Wt 52.2 kg (115 lb)   SpO2 97%   BMI 21.03 kg/m²     Physical Exam  Vitals and nursing note reviewed.   Constitutional:       General: She is not in acute distress.     Appearance: Normal appearance. She is well-developed. She is not ill-appearing, toxic-appearing or diaphoretic.   HENT:      Head: Normocephalic and atraumatic.      Nose: Nose normal.      Mouth/Throat:      Mouth: Mucous membranes are moist.      Pharynx: Oropharynx is clear. No posterior oropharyngeal erythema.   Eyes:      General: No scleral icterus.        Right eye: No discharge.         Left eye: No discharge.      Extraocular Movements: Extraocular movements intact.      Conjunctiva/sclera: Conjunctivae normal.      Pupils: Pupils are equal, round, and reactive to light.   Cardiovascular:      Rate and Rhythm: Normal rate and regular rhythm.      Pulses: Normal pulses.      Heart sounds: Normal heart sounds. No murmur heard.     No gallop.   Pulmonary:      Effort: Pulmonary effort is normal. No respiratory distress.      Breath sounds: Normal breath sounds. No wheezing or rales.   Abdominal:      General: Abdomen is flat. Bowel sounds are normal. There is no distension.      Palpations: Abdomen is soft.      Tenderness: There is no abdominal tenderness. There is no right CVA tenderness, left CVA tenderness or guarding.   Musculoskeletal:         General: No swelling or tenderness. Normal range of motion.      Cervical back: Normal range of motion and neck supple. No rigidity.      Right lower leg: No edema.      Left lower leg: No edema.   Lymphadenopathy:      Cervical: No cervical adenopathy.   Skin:     General: Skin is warm and dry.      Capillary Refill: Capillary refill takes 2 to 3 seconds.      Coloration: Skin is not " jaundiced or pale.      Findings: No bruising.   Neurological:      General: No focal deficit present.      Mental Status: She is alert and oriented to person, place, and time. Mental status is at baseline.      Sensory: No sensory deficit.      Gait: Gait normal.   Psychiatric:         Mood and Affect: Mood normal.         Behavior: Behavior normal.       Medications have been reviewed by provider in current encounter       .

## 2024-11-07 NOTE — ASSESSMENT & PLAN NOTE
TSH in therapeutic range.  Repeat TSH in about 2 months.  Continue same dose of levothyroxine.  We will continue to monitor

## 2024-11-07 NOTE — ASSESSMENT & PLAN NOTE
Under control.  Patient on amlodipine as needed when her blood pressure is more than 140 systolic.  We will continue to monitor.

## 2024-11-09 RX ORDER — SODIUM CHLORIDE 9 MG/ML
20 INJECTION, SOLUTION INTRAVENOUS ONCE
Status: CANCELLED | OUTPATIENT
Start: 2024-11-14

## 2024-11-11 ENCOUNTER — TELEPHONE (OUTPATIENT)
Age: 74
End: 2024-11-11

## 2024-11-11 ENCOUNTER — APPOINTMENT (OUTPATIENT)
Dept: LAB | Facility: CLINIC | Age: 74
End: 2024-11-11
Payer: MEDICARE

## 2024-11-11 DIAGNOSIS — C34.11 MALIGNANT NEOPLASM OF UPPER LOBE, RIGHT BRONCHUS OR LUNG (HCC): ICD-10-CM

## 2024-11-11 LAB
ALBUMIN SERPL BCG-MCNC: 3.5 G/DL (ref 3.5–5)
ALP SERPL-CCNC: 34 U/L (ref 34–104)
ALT SERPL W P-5'-P-CCNC: 14 U/L (ref 7–52)
ANION GAP SERPL CALCULATED.3IONS-SCNC: 6 MMOL/L (ref 4–13)
ANISOCYTOSIS BLD QL SMEAR: PRESENT
AST SERPL W P-5'-P-CCNC: 13 U/L (ref 13–39)
BASOPHILS # BLD MANUAL: 0 THOUSAND/UL (ref 0–0.1)
BASOPHILS NFR MAR MANUAL: 0 % (ref 0–1)
BILIRUB SERPL-MCNC: 0.35 MG/DL (ref 0.2–1)
BUN SERPL-MCNC: 22 MG/DL (ref 5–25)
CALCIUM SERPL-MCNC: 8.3 MG/DL (ref 8.4–10.2)
CHLORIDE SERPL-SCNC: 108 MMOL/L (ref 96–108)
CO2 SERPL-SCNC: 25 MMOL/L (ref 21–32)
CREAT SERPL-MCNC: 0.86 MG/DL (ref 0.6–1.3)
CRP SERPL QL: 22.7 MG/L
EOSINOPHIL # BLD MANUAL: 0.11 THOUSAND/UL (ref 0–0.4)
EOSINOPHIL NFR BLD MANUAL: 1 % (ref 0–6)
ERYTHROCYTE [DISTWIDTH] IN BLOOD BY AUTOMATED COUNT: 16.7 % (ref 11.6–15.1)
ERYTHROCYTE [SEDIMENTATION RATE] IN BLOOD: 25 MM/HOUR (ref 0–29)
GFR SERPL CREATININE-BSD FRML MDRD: 66 ML/MIN/1.73SQ M
GLUCOSE SERPL-MCNC: 79 MG/DL (ref 65–140)
HCT VFR BLD AUTO: 31.6 % (ref 34.8–46.1)
HGB BLD-MCNC: 9.9 G/DL (ref 11.5–15.4)
LDH SERPL-CCNC: 350 U/L (ref 140–271)
LIPASE SERPL-CCNC: 66 U/L (ref 11–82)
LYMPHOCYTES # BLD AUTO: 0.32 THOUSAND/UL (ref 0.6–4.47)
LYMPHOCYTES # BLD AUTO: 3 % (ref 14–44)
MCH RBC QN AUTO: 32.8 PG (ref 26.8–34.3)
MCHC RBC AUTO-ENTMCNC: 31.3 G/DL (ref 31.4–37.4)
MCV RBC AUTO: 105 FL (ref 82–98)
METAMYELOCYTE ABSOLUTE CT: 0.21 THOUSAND/UL (ref 0–0.1)
METAMYELOCYTES NFR BLD MANUAL: 2 % (ref 0–1)
MONOCYTES # BLD AUTO: 0.21 THOUSAND/UL (ref 0–1.22)
MONOCYTES NFR BLD: 2 % (ref 4–12)
MYELOCYTE ABSOLUTE CT: 0.32 THOUSAND/UL (ref 0–0.1)
MYELOCYTES NFR BLD MANUAL: 3 % (ref 0–1)
NEUTROPHILS # BLD MANUAL: 9.4 THOUSAND/UL (ref 1.85–7.62)
NEUTS SEG NFR BLD AUTO: 89 % (ref 43–75)
PLATELET # BLD AUTO: 162 THOUSANDS/UL (ref 149–390)
PLATELET BLD QL SMEAR: ADEQUATE
PMV BLD AUTO: 9.2 FL (ref 8.9–12.7)
POTASSIUM SERPL-SCNC: 4.2 MMOL/L (ref 3.5–5.3)
PROT SERPL-MCNC: 5.8 G/DL (ref 6.4–8.4)
RBC # BLD AUTO: 3.02 MILLION/UL (ref 3.81–5.12)
RBC MORPH BLD: PRESENT
SODIUM SERPL-SCNC: 139 MMOL/L (ref 135–147)
T3FREE SERPL-MCNC: 2.49 PG/ML (ref 2.5–3.9)
TSH SERPL DL<=0.05 MIU/L-ACNC: 2.03 UIU/ML (ref 0.45–4.5)
WBC # BLD AUTO: 10.56 THOUSAND/UL (ref 4.31–10.16)

## 2024-11-11 PROCEDURE — 86140 C-REACTIVE PROTEIN: CPT

## 2024-11-11 PROCEDURE — 85652 RBC SED RATE AUTOMATED: CPT

## 2024-11-11 PROCEDURE — 83690 ASSAY OF LIPASE: CPT

## 2024-11-11 PROCEDURE — 85007 BL SMEAR W/DIFF WBC COUNT: CPT

## 2024-11-11 PROCEDURE — 83615 LACTATE (LD) (LDH) ENZYME: CPT

## 2024-11-11 PROCEDURE — 84481 FREE ASSAY (FT-3): CPT

## 2024-11-11 PROCEDURE — 82024 ASSAY OF ACTH: CPT

## 2024-11-11 PROCEDURE — 85027 COMPLETE CBC AUTOMATED: CPT

## 2024-11-11 PROCEDURE — 36415 COLL VENOUS BLD VENIPUNCTURE: CPT

## 2024-11-11 PROCEDURE — 80053 COMPREHEN METABOLIC PANEL: CPT

## 2024-11-11 PROCEDURE — 84443 ASSAY THYROID STIM HORMONE: CPT

## 2024-11-11 NOTE — TELEPHONE ENCOUNTER
Malaika with [facility] calling.  Pt was referred to their care, however, she initially kept rescheduling their visits and now will not answer for them, including in person visits.  For this reason, they are discharging her from their care.  Please reach out to Malaika with any questions.

## 2024-11-12 LAB — ACTH PLAS-MCNC: 1.8 PG/ML (ref 7.2–63.3)

## 2024-11-13 ENCOUNTER — OFFICE VISIT (OUTPATIENT)
Age: 74
End: 2024-11-13
Payer: MEDICARE

## 2024-11-13 ENCOUNTER — PATIENT OUTREACH (OUTPATIENT)
Dept: HEMATOLOGY ONCOLOGY | Facility: CLINIC | Age: 74
End: 2024-11-13

## 2024-11-13 VITALS
DIASTOLIC BLOOD PRESSURE: 73 MMHG | HEIGHT: 62 IN | SYSTOLIC BLOOD PRESSURE: 108 MMHG | TEMPERATURE: 97.8 F | OXYGEN SATURATION: 98 % | RESPIRATION RATE: 16 BRPM | WEIGHT: 119.49 LBS | BODY MASS INDEX: 21.99 KG/M2 | HEART RATE: 75 BPM

## 2024-11-13 DIAGNOSIS — C79.31 METASTATIC CANCER TO BRAIN (HCC): ICD-10-CM

## 2024-11-13 DIAGNOSIS — G89.3 CANCER ASSOCIATED PAIN: Primary | ICD-10-CM

## 2024-11-13 DIAGNOSIS — C34.11 MALIGNANT NEOPLASM OF UPPER LOBE, RIGHT BRONCHUS OR LUNG (HCC): ICD-10-CM

## 2024-11-13 DIAGNOSIS — Z51.5 PALLIATIVE CARE PATIENT: ICD-10-CM

## 2024-11-13 PROCEDURE — 99348 HOME/RES VST EST LOW MDM 30: CPT

## 2024-11-13 PROCEDURE — G2211 COMPLEX E/M VISIT ADD ON: HCPCS

## 2024-11-13 RX ORDER — SENNOSIDES A AND B 8.6 MG/1
1 TABLET, FILM COATED ORAL DAILY
COMMUNITY

## 2024-11-13 NOTE — PROGRESS NOTES
Ayla Nye was seen in the home today.  Patient seen unaccompanied. Patient  provided all pertinent information today.      Symptom Management    ESAS Scale - Please rate from 0-10 the degree to which you experience the following symptoms (0 being none to 10 being the worst):  Pain - 0  Tired - 5  Drowsiness - 0  Nausea - 0  Appetite - 0  Shortness of Breath - 0  Depression - 2  Anxiety - 2  Wellbeing - 4  Sleeping - 6 - pt states she believes her trouble sleeping is due to taking steroid    Ayla Nye 's medications were verified today with Ayla. Ayla is managing the medications. All medications are up to date.     Allergies verified. No new allergies.     All medical, surgical, family and social history were reviewed with Ayla. There are no updates to patient's history.    All questions and concerns were addressed.  Patient was appreciative of the visit.    Next visit with RN and provider 1 month.

## 2024-11-13 NOTE — ASSESSMENT & PLAN NOTE
Current regimen:  Gabapentin 100mg AM, 100mg  Noon, 300mg Bedtime  Tylenol 650mg TID  Heating pad during the day  Failed therapies:  Celebrex 200mg BID  Oxycodone- felt like she had a headache from the medication  Would like to avoid opiates  Bowel regimen to prevent OIC:  Miralax, colace, metamucil gummies, prune juice

## 2024-11-13 NOTE — PROGRESS NOTES
I reached out to Ayla now that she is on Tx to reassess for any barriers to care and offer any supportive services that may be needed. I left  with reason for my call including my direct phone number [phone]

## 2024-11-13 NOTE — ASSESSMENT & PLAN NOTE
Follows with Dr. Castro.   Completed SRS    Receiving Immunotherapy infusions at [facility]    Radiation Oncology - manages steroids

## 2024-11-13 NOTE — PROGRESS NOTES
[facility] Home Visit: follow up   Name: Ayla Nye      : 1950      MRN: [id number]  Encounter Provider: ZULEYMA Pollard  Encounter Date: 2024   Encounter department: [facility] PALLI[facility]    Assessment & Plan   1. Cancer associated pain  Assessment & Plan:  Current regimen:  Gabapentin 100mg AM, 100mg  Noon, 300mg Bedtime  Tylenol 650mg TID  Heating pad during the day  Failed therapies:  Celebrex 200mg BID  Oxycodone- felt like she had a headache from the medication  Would like to avoid opiates  Bowel regimen to prevent OIC:  Miralax, colace, metamucil gummies, prune juice      2. Palliative care patient  Assessment & Plan:  Ayla follows with palliative care for psychosocial support and symptom management of lung cancer with brain mets, perianal tumor   Symptoms - pain, sleep trouble- Pain is much better controlled.    ACP -  on file   RTC - 4 weeks    Lives alone.   Has two daughters .   Ex- is supportive and involved in care  Has brother that lives close by that helps as well.     Daughters are getting a life alert set up for patient.     No longer drives.     3. Metastatic cancer to brain (HCC)  Assessment & Plan:  Follows with Dr. Castro.   Completed SRS    Receiving Immunotherapy infusions at [facility]    Radiation Oncology - manages steroids  4. 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   Follows with Dr. Castro outpatient.  Treated with chemoradiotherapy therapy.         Subjective   Chief Complaint   Patient presents with    Follow-up     Follow up home visit for support and symptom management secondary to palliative diagnosis of lung cancer with mets to brain, perianal tumor.        History of Present Illness   Ayla Nye is a 74 y.o. female w/ Stage III NSCLC, new onset brain mets 24, completed SRS. She was found to have  "mass in the right anorectal fat/right ischial tuberosity per PET. She follows with [facility] Hem/onc      Patient is seen in the home, palliative RN present.     Since last visit with patient she was admitted to the hospital for confusion- she reports that this was found to be related to her tapering her steroids.    Today she is doing well. Alert and oriented.   Pain is well controlled with gabapentin and tylenol.   She currently denies pain.   Is sleeping through the night  She reports she has no issues with her appetite.   Is scheduled for infusion tomorrow.   She reports that she has tapered down on steroids again- she will contact radiation oncology team if she starts to feel  \"off\"    Has support of family.  Daughters have hired a caregiver to sit with patient in the evening for a few hours  Working on [employer] life alert set up.   Denies falls.       Current Outpatient Medications:     acetaminophen (TYLENOL) 325 mg tablet, Take 2 tablets (650 mg total) by mouth every 6 (six) hours, Disp: 30 tablet, Rfl: 0    amLODIPine (NORVASC) 5 mg tablet, Take 1 tablet (5 mg total) by mouth daily, Disp: 30 tablet, Rfl: 0    dexamethasone (DECADRON) 2 mg tablet, Take 1 tablet (2 mg total) by mouth every 12 (twelve) hours for 4 days, THEN 1 tablet (2 mg total) daily., Disp: 58 tablet, Rfl: 0    folic acid (FOLVITE) 1 mg tablet, Take 1 tablet (1 mg total) by mouth daily, Disp: 30 tablet, Rfl: 6    gabapentin (NEURONTIN) 100 mg capsule, Take 1 capsule (100 mg total) by mouth 2 (two) times a day before breakfast and lunch, Disp: 60 capsule, Rfl: 0    levETIRAcetam (KEPPRA) 1000 MG tablet, Take 1 tablet (1,000 mg total) by mouth every 12 (twelve) hours, Disp: 60 tablet, Rfl: 0    levothyroxine 50 mcg tablet, Take 1 tablet (50 mcg total) by mouth daily in the early morning, Disp: 30 tablet, Rfl: 0    pantoprazole (PROTONIX) 40 mg tablet, Take 1 tablet (40 mg total) by mouth daily in the early morning, Disp: 30 tablet, " "Rfl: 0    Probiotic Product (PROBIOTIC DAILY PO), Take 1 capsule by mouth daily ON HOLD, Disp: , Rfl:     senna (SENOKOT) 8.6 MG tablet, Take 1 tablet by mouth daily Takes one every other day, Disp: , Rfl:     vitamin B-12 (VITAMIN B-12) 1,000 mcg tablet, Take 1 tablet (1,000 mcg total) by mouth daily, Disp: 90 tablet, Rfl: 1  No current facility-administered medications for this visit.    Facility-Administered Medications Ordered in Other Visits:     fentaNYL injection, , Intravenous, PRN, Greg Gamble CRNA    midazolam (VERSED) injection, , Intravenous, PRN, Greg Gamble CRNA    Objective   /73 (BP Location: Left arm, Patient Position: Sitting, Cuff Size: Standard)   Pulse 75   Temp 97.8 °F (36.6 °C) (Temporal)   Resp 16   Ht 5' 2\" (1.575 m)   Wt 54.2 kg (119 lb 7.8 oz)   SpO2 98%   BMI 21.85 kg/m²   Physical Exam  Vitals and nursing note reviewed.   Constitutional:       General: She is not in acute distress.  HENT:      Head: Normocephalic and atraumatic.   Eyes:      General:         Right eye: No discharge.         Left eye: No discharge.   Cardiovascular:      Rate and Rhythm: Normal rate.   Pulmonary:      Effort: Pulmonary effort is normal. No respiratory distress.      Breath sounds: Normal breath sounds.   Abdominal:      General: Bowel sounds are normal.      Palpations: Abdomen is soft.   Musculoskeletal:         General: Normal range of motion.      Cervical back: Normal range of motion.      Right lower leg: No edema.      Left lower leg: No edema.   Skin:     General: Skin is warm and dry.   Neurological:      General: No focal deficit present.      Mental Status: She is alert and oriented to person, place, and time. Mental status is at baseline.   Psychiatric:         Mood and Affect: Mood normal.         Behavior: Behavior normal.         Thought Content: Thought content normal.         Judgment: Judgment normal.          Recent labs:  Lab Results   Component Value " Date/Time    SODIUM 139 2024 08:48 AM    K 4.2 2024 08:48 AM    BUN 22 2024 08:48 AM    CREATININE 0.86 2024 08:48 AM    GLUC 79 2024 08:48 AM    CALCIUM 8.3 (L) 2024 08:48 AM    AST 13 2024 08:48 AM    ALT 14 2024 08:48 AM    ALB 3.5 2024 08:48 AM    TP 5.8 (L) 2024 08:48 AM    EGFR 66 2024 08:48 AM     Lab Results   Component Value Date/Time    HGB 9.9 (L) 2024 08:48 AM    WBC 10.56 (H) 2024 08:48 AM     2024 08:48 AM    INR 0.94 10/20/2024 06:30 PM    PTT 29 10/20/2024 06:30 PM     Lab Results   Component Value Date/Time    VLA5BGJLQIRL 2.032 2024 08:48 AM       Recent Imaging:  Procedure: EEG Video Monitoring 24 Hour  Result Date: 10/26/2024  Narrative: Table formatting from the original result was not included. Continuous Video EEG Monitoring Patient Name:  Ayla Nye  MRN: [id number] :  1950 File #: [id number]  Age: 74 y.o. Ordering Provider: Hao Torrez MD  Study Start: 10/22/2024 0840 Study End: 10/22/2024 2317            Study type: Continuous video EEG ICD 10 diagnosis: Seizure R56.9 and Encephalopathy, unspecified G93.40  ------------------------------------------------- Patient History: This recording was observed in a 73 y.o. female with history of brain metastasis to evaluate for seizure as the cause of encephalopathy.  Medications include: Levetiracetam  ------------------------------------------------- Description of Procedure: 32 channel digital recording with electrodes placed according to the International 10-20 system with continuous video, ECG, EOG and the possible addition of T1/T2 electrodes. This study was monitored intermittently by a monitoring technologist.  The physician interpreting the study had access to the data throughout the recording.   The recording was technically satisfactory.  ------------------------------------------------- Results: Manual Review: During  wakefulness there were runs of 10-11 Hz posteriorly dominant rhythm that attenuated with eye opening and was present nearly exclusively on the right. There were frontal low amplitude beta activities.  During drowsiness the posteriorly dominant rhythm attenuated and there were diffuse theta and beta activities. During sleep there were vertex waves and sleep spindles.  There were continuous left posterior quadrant low to medium amplitude polymorphic theta more than delta activities. There were higher amplitudes and enhanced fast activities at T5 and P3. There were T5/P3 sharp waves, often poorly formed. Initially there were frequent left posterior temporal / parietal poorly formed (blunt and at times sharply contoured) LPDs occurring at 0.5-1 Hz. Periodic discharges attenuated over the initial hours of recording. By 14:30 there were frequent sporadic sharp waves, but periodic discharges had resolved. By the end of recording left posterior sharp waves were occasional.  Other findings: Samples of the single channel ECG demonstrated a regular rhythm.  Events: No push buttons were activated.  ------------------------------------------------- Interpretation: This 14.5 hour continuous video-EEG recording is abnormal.  Continuous left posterior quadrant polymorphic theta/delta slowing indicates underlying focal neuronal dysfunction. A left posterior breach rhythm is consistent with an underlying skull defect. Initially, there were frequent left posterior temporal / parietal poorly formed (blunt and at times sharply contoured) lateralized periodic discharges (LPDs) occurring at 0.5-1 Hz. LPDs resolved after the first few hours of recording.  No seizures are present. This concludes 38.5 hours of continuous video EEG monitoring. No seizures were present. Hao Torrez MD  [facility] Diplomate, ABPN Neurology and Epilepsy     Procedure: EEG Video Monitoring 24 Hour  Result Date: 10/23/2024  Narrative:  Table formatting from the original result was not included. Continuous Video EEG Monitoring Patient Name:  Ayla Nye  MRN: [id number] :  1950 File #: [id number]  Age: 73 y.o. Ordering Provider: Wander Muller DO  Start/End: 10/21/2024 0229 to 10/21/2024 0330 and 10/21/2024 0839 to 10/22/2024 0839 No recording from 19:36 to 21:04 (MRI)            Study type: Continuous video EEG ICD 10 diagnosis: Seizure R56.9 and Encephalopathy, unspecified G93.40 ------------------------------------------------- Patient History: This recording was observed in a 73 y.o. female with history of brain metastasis to evaluate for seizure as the cause of encephalopathy. Medications include: Levetiracetam ------------------------------------------------- Description of Procedure: 32 channel digital recording with electrodes placed according to the International 10-20 system with continuous video, ECG, EOG and the possible addition of T1/T2 electrodes. This study was monitored intermittently by a monitoring technologist.  The physician interpreting the study had access to the data throughout the recording.   The recording was technically satisfactory. ------------------------------------------------- Results: Manual Review: During wakefulness there were runs of 10-11 Hz posteriorly dominant rhythm that attenuated with eye opening and was present nearly exclusively on the right. There were frontal low amplitude beta activities. During drowsiness the posteriorly dominant rhythm attenuated and there were diffuse theta and beta activities. During sleep there were vertex waves and sleep spindles. There were continuous right posterior quadrant low to medium amplitude polymorphic theta more than delta activities. There were higher amplitudes and enhanced fast activities at T5 and P3. There were occasional T5/P3 sharp waves, often poorly formed. During the final  hours of recording there were frequent left posterior temporal /  parietal poorly formed (blunt and at times sharply contoured) LPDs occurring at 0.5-1 Hz. Other findings: Samples of the single channel ECG demonstrated a regular rhythm. Events: No push buttons were activated. ------------------------------------------------- Interpretation: This 24 hour continuous video-EEG recording is abnormal. Continuous left posterior quadrant polymorphic theta/delta slowing indicates underlying focal neuronal dysfunction. A left posterior breach rhythm is consistent with an underlying skull defect. During the later portions of recording there were frequent left posterior temporal / parietal poorly formed (blunt and at times sharply contoured) lateralized periodic discharges (LPDs) occurring at 0.5-1 Hz. LPDs indicate high seizure risk and are often associated with acute/subacute destructive neuronal injury. No seizures are present. Hao Torrez MD  [facility] Diplomate, ABPN Neurology and Epilepsy     Procedure: CT chest abdomen pelvis w contrast  Result Date: 10/23/2024  Narrative: CT CHEST, ABDOMEN AND PELVIS WITH IV CONTRAST INDICATION: Evaluate for further mets. History of lung cancer with metastases. COMPARISON: CT chest abdomen pelvis 7/3/2024, PET/CT 8/5/2024 TECHNIQUE: CT examination of the chest, abdomen and pelvis was performed. Multiplanar 2D reformatted images were created from the source data. Coronal MIP images of the chest were also provided. This examination, like all CT scans performed in the [facility], was performed utilizing techniques to minimize radiation dose exposure, including the use of iterative reconstruction and automated exposure control. Radiation dose length product (DLP) for this visit: 532.3 mGy-cm IV Contrast: 85 mL of iohexol (OMNIPAQUE) Enteric Contrast: Not administered. FINDINGS: CHEST LUNGS: 2.5 x 1.8 x 1.9 cm irregular, necrotic right upper lobe medial juxtapleural mass (6/38 and 601/43) has decreased in  size from previous study, previously 4.6 x 4.8 x 3.3 cm by report. A 7 x 5 mm spiculated nodule posterior to the dominant mass is again noted which appears similar (6/41). Another previously described satellite nodule anterior and inferior to the dominant mass in the right lobe previously measuring up to 1.4 cm currently measures about 0.8 x 0.4 cm (6/50. There is some linear scarring or atelectasis also noted anteriorly and medially from the dominant mass toward the pleura. New 8 mm right lower lobe nodule (6/94). New 5 mm left lower lobe nodule (6/93). Stable 2.4 cm groundglass opacity anterior left upper lobe (6/43). Mild background emphysema. Mild scattered linear atelectasis or scarring in the lungs. AIRWAYS: No significant filling defect. PLEURA: No pleural effusion. Stable right apical pleural-parenchymal scarring. Pleural calcification posterior left lower lobe again noted. HEART/GREAT VESSELS: Heart is unremarkable for patient's age. Atherosclerotic changes thoracic aorta and coronary arteries. No thoracic aortic aneurysm. MEDIASTINUM AND SUDEEP: -Right paratracheal lymph node measuring 1.0 x 0.7 cm (2/44), previously 2.4 x 2.2 cm. -1.2 x 0.8 cm precarinal lymph node (2/50), significantly decreased from previous diagnostic study in July 2024, difficult to directly compare to previous size on noncontrast PET CT but also possibly slightly smaller. Some FDG avidity was present in this  region on the prior PET study. -Right hilar lymph node seen on the prior diagnostic CT measures about 6 mm short axis (2/56), previously about 1.7 cm when measured in a similar fashion and is of lower density currently. -4 mm short axis subcarinal lymph node, previously measured about 9 mm. The esophagus is unremarkable. CHEST WALL AND LOWER NECK: Subcentimeter bilateral thyroid nodules. Incidental discovery of one or more thyroid nodule(s) measuring less than 1.5 cm and without suspicious features is noted in this patient who is  above 35 years old; according to guidelines published in the February 2015 white paper on incidental thyroid nodules in the Journal of the American College of Radiology (JACR), no further evaluation is recommended. ABDOMEN LIVER/BILIARY TREE: Mild hepatomegaly. Tiny hypodensity in the left hepatic lobe (2/107), stable in retrospect. Mild prominence of the common hepatic duct with tapering of the distal CBD, may be age-related. GALLBLADDER: No calcified gallstones. No pericholecystic inflammatory change. SPLEEN: Unremarkable. PANCREAS: Unremarkable. ADRENAL GLANDS: Unremarkable. KIDNEYS/URETERS: There is an ill-defined 2.2 x 1.8 cm hypodensity within the medial upper pole right kidney which is new from the previous CT study and not clearly detectable on prior PET/CT either. There is no significant perinephric fat stranding or elevated white blood cell count to suggest pyelonephritis, therefore this is suspicious for metastasis. There is also a 6 mm hypodensity in the posterior lower pole right kidney, barely if at all perceptible on the prior contrast-enhanced CT study. Although this could correspond to enlargement of a complex subcentimeter cyst (as there are several additional small cysts in the right kidney), additional metastasis is not excluded. Tiny nonobstructing calculi right kidney measuring up to 2 mm. Several subcentimeter left renal hypodensities are too small to characterize, unchanged from prior study. No hydronephrosis on either side. STOMACH AND BOWEL: Colonic diverticulosis without findings of acute diverticulitis. APPENDIX: No findings to suggest appendicitis. ABDOMINOPELVIC CAVITY: No diffuse ascites. Trace presacral edema which is new. No pneumoperitoneum. No lymphadenopathy. VESSELS: Atherosclerotic changes abdominal aorta without evidence of aneurysm. PELVIS REPRODUCTIVE ORGANS: Unremarkable for patient's age. URINARY BLADDER: Tiny focus of gas within the urinary bladder, presumably related to  recent catheterization. Correlate clinically. ABDOMINAL WALL/INGUINAL REGIONS: Enlarging 6.3 x 4.0 x 5.0 cm irregular rim-enhancing mass with central low attenuation in the right ischiorectal fossa abutting the anteromedial margin of the right gluteus britt muscle, the adjacent obturator internus as well as the right levator ani muscle but without discrete muscle invasion of these structures. This was subcentimeter in size on previous CT study from July in retrospect, measured about 2.7 x 2.0 cm on the previous PET/CT and was FDG avid. This almost certainly represents significant enlargement of a necrotic metastasis given interval enlargement over recent subsequent study and could be easily verified with percutaneous biopsy if necessary. BONES: No acute fracture or suspicious osseous lesion.     Impression: 1. New small bilateral pulmonary nodules, likely metastases. 2. New right renal lesion(s) concerning for metastasis. 3. Significant interval enlargement of necrotic appearing mass in the right ischiorectal fossa, likely metastasis. 4. Decreasing right upper lobe necrotic mass with stable and/or decreasing satellite nodularity. 5. Improved mediastinal and right hilar adenopathy. 6. Stable 2.4 cm left upper lobe groundglass density. Workstation performed: [id number]     Procedure: MRI brain seizure wo and w contrast  Result Date: 10/22/2024  Narrative: MRI  BRAIN  - WITH AND WITHOUT CONTRAST, SEIZURE PROTOCOL INDICATION: seizure activity.   Lung cancer with brain mets status post SRS. COMPARISON: CT from yesterday. MRI brain dated 10/8/2024. TECHNIQUE:  Multiplanar, multisequence imaging of the brain was performed before and after gadolinium administration. IV Contrast:  5 mL of Gadobutrol injection (SINGLE-DOSE) IMAGE QUALITY:   Diagnostic. FINDINGS: BRAIN PARENCHYMA: Redemonstration of multiple enhancing metastatic lesions. Left occipital lobe metastasis measuring 2.3 cm (image 15 series 12). Maximal  longitudinal measurement is minimally increased but there is increased central necrosis within the anterior aspect of the lesion. Peripheral restricted diffusion in the posterior aspect of the lesion is unchanged Stable 6 mm lesion in the paramedian left occipital lobe on image 19 series 12. Punctate enhancing lesion in the left temporal lobe image 10 series 12 is mildly decreased in conspicuity. Stable punctate enhancing in the cerebellar vermis (image 7 series 12. Stable 3 mm enhancing lesion in the paramedian left frontal lobe on image 17 series 12. Previously described enhancement along the 7th nerves within the internal auditory canals is not significantly changed No new masses or abnormal enhancement. Stable left parieto-occipital edema with mass effect on the atria and occipital horns of the left lateral ventricle. No shift or herniation. No acute infarct or hemorrhage. Stable T2/FLAIR hyperintensities in the periventricular and subcortical matter likely due to mild chronic microangiopathy. VENTRICLES: No hydrocephalus. SELLA AND PITUITARY GLAND:  Normal. ORBITS:  Normal. PARANASAL SINUSES:  Normal. VASCULATURE:  Evaluation of the major intracranial vasculature demonstrates appropriate flow voids. CALVARIUM AND SKULL BASE: Stable heterogeneous marrow signal. EXTRACRANIAL SOFT TISSUES: Unremarkable     Impression: Redemonstration of intracranial metastatic lesions 2.3 cm enhancing lesion at the left occipital lobe is slightly increased in maximum dimension with increased central necrosis at the anterior margin that may be partly due to radiation necrosis. Additional smaller metastases are stable or mildly decreased in size. Stable left parieto-occipital edema with mass effect on the left lateral ventricle. No new metastases. No acute infarct or hemorrhage Workstation performed: [id number]     Procedure: CT stroke alert brain  Result Date: 10/20/2024  Narrative: CT BRAIN - STROKE ALERT PROTOCOL INDICATION:    Stroke Alert. COMPARISON:  None. TECHNIQUE:  CT examination of the brain was performed.  In addition to axial images, coronal reformatted images were created and submitted for interpretation. Radiation dose length product (DLP) for this visit:  1003 mGy-cm .  This examination, like all CT scans performed in the [facility], was performed utilizing techniques to minimize radiation dose exposure, including the use of iterative reconstruction and automated exposure control. IMAGE QUALITY:  Diagnostic. FINDINGS: PARENCHYMA: Vasogenic edema in the left parietal and superior temporal region. Periventricular and subcortical hypoattenuating foci suggesting underlying microangiopathic disease. No intracranial hemorrhage. Mass effect in the left mesial temporal and parietal region without midline shift. VENTRICLES AND EXTRA-AXIAL SPACES: Partial effacement of the left temporal horn. No hydrocephalus. VISUALIZED ORBITS: Intact. PARANASAL SINUSES: Clear. CALVARIUM AND EXTRACRANIAL SOFT TISSUES:   No lytic or blastic lesion.     Impression: Stable vasogenic edema in the left parietal temporal region. Previously demonstrated left posterior parietal lesion is less well-visualized likely due to differences in technique. Findings were directly discussed with Liang Luong at approximately  7:10 PM  . Workstation performed: [id number]     Procedure: CTA stroke alert (head/neck)  Result Date: 10/20/2024  Narrative: CTA NECK AND BRAIN WITH AND WITHOUT CONTRAST INDICATION: Stroke Alert COMPARISON: 2/28/2024, 9/9/2024 and 10/8/2024. TECHNIQUE:  Post contrast imaging was performed after administration of iodinated contrast through the neck and brain. Post contrast axial 0.625 mm images timed to opacify the arterial system.  3D rendering was performed on an independent workstation.   MIP reconstructions performed. Coronal and sagittal reconstructions were performed of the non contrast portion of the brain. Radiation dose  length product (DLP) for this visit:  1126 mGy-cm .  This examination, like all CT scans performed in the [facility], was performed utilizing techniques to minimize radiation dose exposure, including the use of iterative reconstruction and automated exposure control. IV Contrast:  70 mL of iohexol (OMNIPAQUE) IMAGE QUALITY:   Diagnostic FINDINGS: CTA NECK ARCH AND GREAT VESSELS: No significant plaque in the aortic arch. No stenosis in the subclavian arteries. VERTEBRAL ARTERIES: Left vertebral artery arises directly from the aortic arch. No stenosis or dissection. RIGHT CAROTID: No stenosis.    No dissection. LEFT CAROTID: No stenosis.    No dissection. NASCET criteria was used to determine the degree of internal carotid artery diameter stenosis. CTA BRAIN: INTERNAL CAROTID ARTERIES: No stenosis or occlusion. ANTERIOR CEREBRAL ARTERY CIRCULATION:  No stenosis or occlusion. MIDDLE CEREBRAL ARTERY CIRCULATION:  No stenosis or occlusion. DISTAL VERTEBRAL ARTERIES:  No stenosis or occlusion. BASILAR ARTERY:  No stenosis or occlusion. POSTERIOR CEREBRAL ARTERIES: No stenosis or occlusion. VENOUS STRUCTURES: Patent dural venous sinuses. NON VASCULAR ANATOMY BONY STRUCTURES:  No lytic or blastic lesion. SOFT TISSUES OF THE NECK: No mass or lymphadenopathy. THORACIC INLET: Right upper lobe spiculated 3.4 cm nodule. Not significantly changed since the previous exam.     Impression: No hemodynamically significant stenosis, dissection or occlusion of the carotid or vertebral arteries or major vessels of the [place]. Findings were directly discussed with Liang Luong at  7:13 PM  . Workstation performed: [id number]     Procedure: XR chest 1 view portable  Result Date: 10/17/2024  Narrative: XR CHEST PORTABLE INDICATION: dizziness. Right upper lobe lung cancer COMPARISON: Chest radiograph 7/3/2024, PET/CT 8/5/2024 FINDINGS: Monitoring leads and clips project over the chest. Right upper lobe  paramediastinal mass with associated opacity has slightly improved. No infiltrates. No pneumothorax or pleural effusion. Normal cardiomediastinal silhouette. Bones are unremarkable for age. Normal upper abdomen.     Impression: Right upper lobe paramediastinal mass has slightly improved. No active disease. Resident: Reg Head I, the attending radiologist, have reviewed the images and agree with the final report above. Workstation performed: [id number]     Procedure: CT head without contrast  Result Date: 10/16/2024  Narrative: CT BRAIN - WITHOUT CONTRAST INDICATION:   R lower quadrantanopsia. COMPARISON: Recent brain MRI 10/8/2024 TECHNIQUE:  CT examination of the brain was performed.  Multiplanar 2D reformatted images were created from the source data. Radiation dose length product (DLP) for this visit:  946 mGy-cm .  This examination, like all CT scans performed in the [facility], was performed utilizing techniques to minimize radiation dose exposure, including the use of iterative reconstruction and automated exposure control. IMAGE QUALITY:  Diagnostic. FINDINGS: PARENCHYMA: There is redemonstration of vasogenic edema involving the left parietal occipital lobes in this patient with known intracranial metastatic disease status post treatment. There is otherwise no definite evidence for new areas of vasogenic edema. There is no CT evidence for large acute vascular distribution infarct. There is local mass effect on the posterior body and occipital horn of the left lateral ventricle with otherwise no significant midline shift. Basilar cisterns are patent. No acute intracranial hemorrhage. VENTRICLES AND EXTRA-AXIAL SPACES: No hydrocephalus. VISUALIZED ORBITS: Normal visualized orbits. PARANASAL SINUSES: Normal visualized paranasal sinuses. CALVARIUM AND EXTRACRANIAL SOFT TISSUES: Normal.     Impression: Redemonstration of left parieto-occipital vasogenic edema in this patient with known  intracranial metastatic disease status post treatment. Correlate with recent brain MRI performed 10/8/2024. Otherwise no CT evidence for a new large acute vascular description infarct or acute intracranial hemorrhage. 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 post SRS, brain metastasis COMPARISON: MRI brain BT with and without contrast 9/10/2024. CTA head and neck with and without contrast 9/9/2024. CT head without contrast 8/21/2024 MRI brain with and without contrast 7/30/2024, 3/28/2024. TECHNIQUE: Multiplanar, multisequence imaging of the brain and sella was performed before and after gadolinium administration. IV Contrast:  10 mL of Gadobutrol injection (SINGLE-DOSE) IMAGE QUALITY:   Diagnostic. FINDINGS: BRAIN PARENCHYMA: Multiple enhancing metastatic lesions as detailed below with comparison made to MRI brain with and without contrast 9/10/2024: - Tiny left anterolateral frontal lobe lesion (13:112), unchanged. - 0.6 cm left paramedian parietal lobe lesion (13:98), unchanged. - 0.3 cm left paramedian frontal lobe lesion (13:90), previously 0.4 cm. - 2.2 cm left occipital lobe lesion with central necrosis (13:75), likely due to treatment related changes of radiation necrosis, unchanged. - 0.4 cm left temporal lobe lesion (13:51), previously 0.3 cm. - Tiny cerebellar vermis lesion (13:41), previously 0.4 cm. Unchanged enhancement in bilateral cranial nerve VII and both internal auditory canals, suspicious for leptomeningeal metastasis. No new enhancing lesion. Persistent diffuse perilesional vasogenic edema in left parietal lobe and left occipital lobe with mild mass effect on posterior aspect of left lateral ventricle. No midline shift. No acute intracranial hemorrhage. No  diffusion weighted signal abnormality to suggest acute infarction. No abnormal hypoperfusion. Small scattered hyperintensities on T2/FLAIR imaging are noted in the periventricular and subcortical white matter demonstrating an appearance that is statistically most likely to represent mild microangiopathic change. VENTRICLES: Mild mass effect on posterior aspect of left lateral ventricle. No obstructive hydrocephalus. No acute intraventricular hemorrhage. SELLA AND PITUITARY GLAND:  Normal. ORBITS:  Normal. PARANASAL SINUSES:  Normal. VASCULATURE:  Evaluation of the major intracranial vasculature demonstrates appropriate flow voids. CALVARIUM AND SKULL BASE:  Normal. EXTRACRANIAL SOFT TISSUES:  Normal.     Impression: Mixed treatment response since MRI brain 9/10/2024 - Slightly increase size of left temporal lobe metastatic lesion. - Unchanged left occipital lobe metastatic lesion with evidence of radiation necrosis, left paramedian parietal lobe lesion, and tiny left anterolateral lobe frontal lobe lesion. - Unchanged enhancement in bilateral cranial nerve VII and both internal auditory canals, suspicious for leptomeningeal metastasis. - Decreased size of left paramedian frontal lobe lesion and tiny cerebellar vermis lesion. - No new enhancing intracranial metastasis. Persistent diffuse perilesional vasogenic edema in left parietal lobe and left occipital lobe with mild mass effect on posterior aspect of left lateral ventricle. The study was marked in EPIC for significant notification. Workstation performed: [id number]     Procedure: IR biopsy other  Result Date: 9/13/2024  Narrative: PROCEDURE: 1.  Ultrasound-guided right gluteal mass biopsy 2.  Fluoroscopic-guided diagnostic lumbar puncture STAFF: Greg Birch M.D. NUMBER OF IMAGES: Multiple. COMPLICATIONS: None. MEDICATIONS: Moderate sedation was monitored by radiology nursing staff.  Monitoring of conscious sedation totaled 15 minutes. INDICATION: Metastatic  lung cancer. New right gluteal mass. 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 PROCEDURE: Under real-time ultrasound guidance, a 17-gauge coaxial needle was advanced into the right gluteal mass. Under ultrasound guidance, a total of 3 2.3 cm. core biopsies were obtained. The tract with embolized with Gelfoam, and the coaxial needle was removed. Next, a suitable location for puncture was identified with fluoroscopy in the prone position at the L2-3 level. A 20-gauge spinal needle was advanced into the subarachnoid space using fluoroscopic guidance.  An opening pressure was measured.  16 mL's of clear cerebrospinal fluid was then removed and sent for analysis.  The needle was removed and the puncture site was covered with a sterile dressing. FINDINGS: 1.  Pre-procedural ultrasound showed the approximately 3 cm superficial right gluteal mass. 2.  Intra-procedural ultrasound confirmed good positioning of the biopsy needle within the mass. 3.  Samples were sent in formalin. 4.  Post-procedural ultrasound showed no immediate complication. 5.  Fluoroscopy confirmed good positioning within the subarachnoid space, with prompt return of cerebrospinal fluid. 6.  Opening pressure was measured at 15 cm H20.     Impression: 1.  Ultrasound-guided right gluteal mass biopsy. 2.  Fluoroscopic-guided lumbar puncture. Workstation performed: [id number]     Procedure: IR lumbar puncture  Result Date: 9/13/2024  Narrative: PROCEDURE: 1.  Ultrasound-guided right gluteal mass biopsy 2.  Fluoroscopic-guided diagnostic lumbar puncture STAFF: Greg Birch M.D. NUMBER OF IMAGES: Multiple. COMPLICATIONS: None. MEDICATIONS: Moderate sedation was monitored by radiology nursing staff.  Monitoring of conscious sedation totaled 15 minutes. INDICATION: Metastatic lung cancer. New right gluteal mass. 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 PROCEDURE: Under real-time ultrasound guidance, a 17-gauge coaxial needle was advanced into the right  gluteal mass. Under ultrasound guidance, a total of 3 2.3 cm. core biopsies were obtained. The tract with embolized with Gelfoam, and the coaxial needle was removed. Next, a suitable location for puncture was identified with fluoroscopy in the prone position at the L2-3 level. A 20-gauge spinal needle was advanced into the subarachnoid space using fluoroscopic guidance.  An opening pressure was measured.  16 mL's of clear cerebrospinal fluid was then removed and sent for analysis.  The needle was removed and the puncture site was covered with a sterile dressing. FINDINGS: 1.  Pre-procedural ultrasound showed the approximately 3 cm superficial right gluteal mass. 2.  Intra-procedural ultrasound confirmed good positioning of the biopsy needle within the mass. 3.  Samples were sent in formalin. 4.  Post-procedural ultrasound showed no immediate complication. 5.  Fluoroscopy confirmed good positioning within the subarachnoid space, with prompt return of cerebrospinal fluid. 6.  Opening pressure was measured at 15 cm H20.     Impression: 1.  Ultrasound-guided right gluteal mass biopsy. 2.  Fluoroscopic-guided lumbar puncture. Workstation performed: [id number]     Procedure: MRI Brain BT w wo Contrast  Result Date: 9/11/2024  Narrative: MRI BRAIN WITH AND WITHOUT CONTRAST INDICATION: mets. COMPARISON: CTA head and neck with and without contrast 9/9/2024. CT head without contrast 8/21/2024. MRI brain with and without contrast 7/30/2024, 3/28/2024. MRI brain without contrast 2/10/2015. TECHNIQUE: Multiplanar, multisequence imaging of the brain and sella was performed before and after gadolinium administration. IV Contrast:  10 mL of Gadobutrol injection (SINGLE-DOSE) IMAGE QUALITY:   Diagnostic. FINDINGS: BRAIN PARENCHYMA: Multiple enhancing metastatic lesions. For reference (measured on long axis): - Tiny left anterolateral frontal lobe lesion (13:14), previously 0.3 cm. - 0.6 cm left paramedian parietal lobe lesion  (13:97), unchanged. - 0.4 cm left paramedian frontal lobe lesion (13:93), previously 0.6 cm ring-enhancing lesion. - 2.2 cm left occipital lobe lesion (13:74), unchanged. - 0.3 cm left temporal lobe lesion (13:53), new. - 0.4 cm cerebellar vermis ring-enhancing lesion (13:39), previously 0.5 cm. Similar nearly symmetric enhancement in bilateral cranial nerve VII in both internal auditory canals, suspicious for leptomeningeal metastasis. Persistent diffuse vasogenic edema in left parietal, left occipital, and left posterior temporal lobes with mild mass effect on posterior aspect of left lateral ventricle, similar to recent CT heads but worsened since MRI brain 7/30/2024. No midline shift. No acute intracranial hemorrhage. No diffusion weighted signal abnormality to suggest acute infarction. No abnormal hyperperfusion. Small scattered hyperintensities on T2/FLAIR imaging are noted in the periventricular and subcortical white matter demonstrating an appearance that is statistically most likely to represent mild microangiopathic change. VENTRICLES: Mild mass effect on posterior aspect of the left lateral ventricle. No obstructive hydrocephalus. No acute intraventricular hemorrhage. SELLA AND PITUITARY GLAND:  Normal. ORBITS:  Normal. PARANASAL SINUSES:  Normal. VASCULATURE:  Evaluation of the major intracranial vasculature demonstrates appropriate flow voids. CALVARIUM AND SKULL BASE:  Normal. EXTRACRANIAL SOFT TISSUES:  Normal.     Impression: Mixed treatment response. - New tiny left temporal lobe metastatic lesion. - Unchanged left paramedian parietal lobe and left occipital lobe metastatic lesions - largest in left occipital lobe. - Unchanged nearly symmetric enhancement along the course of bilateral cranial nerve VII in both internal auditory canals, suspicious for leptomeningeal metastasis. Differential includes bilateral Bell's palsy. - Slightly decreased size of left anterolateral frontal lobe, left paramedian  frontal lobe, and cerebellar vermis metastatic lesions. - Persistent diffuse vasogenic edema in left parietal, left occipital, and left posterior temporal lobes with mild mass effect on posterior aspect of left lateral ventricle, similar to recent CT heads but worsened since MRI brain 7/30/2024. The study was marked in EPIC for immediate notification. Workstation performed: [id number]     Procedure: CTA head and neck with and without contrast  Result Date: 9/9/2024  Narrative: CTA NECK AND BRAIN WITH AND WITHOUT CONTRAST INDICATION: Known brain mets, confusion/disorientation over the past 2 days, known visual field deficits in right inferior visual field, RUE finger to nose abnormal COMPARISON:   CT brain August 21, 2024. Prior CT angiogram July 29, 2024. TECHNIQUE:  Routine CT imaging of the Brain without contrast.Post contrast imaging was performed after administration of iodinated contrast through the neck and brain. Post contrast axial 0.625 mm images timed to opacify the arterial system.  3D rendering was performed on an independent workstation.   MIP reconstructions performed. Coronal and sagittal reconstructions were performed of the non contrast portion of the brain. Radiation dose length product (DLP) for this visit:  2108 mGy-cm .  This examination, like all CT scans performed in the [facility], was performed utilizing techniques to minimize radiation dose exposure, including the use of iterative reconstruction and automated exposure control. IV Contrast:  85 mL of iohexol (OMNIPAQUE) IMAGE QUALITY:   Diagnostic FINDINGS: NONCONTRAST BRAIN PARENCHYMA: Vasogenic edema identified in the left parietal and occipital lobes similar to the prior study. This is secondary to known metastatic disease. Mass effect on the posterior horn of left lateral ventricle noted. Low-density in periventricular white matter compatible with mild chronic microangiopathic change. VENTRICLES AND EXTRA-AXIAL SPACES: As  above. No hydrocephalus. VISUALIZED ORBITS: Normal. PARANASAL SINUSES: Normal. CTA NECK ARCH AND GREAT VESSELS: Visualized arch and great vessels are normal. VERTEBRAL ARTERIES: Patent extracranial segments. RIGHT CAROTID: No stenosis.    No dissection. LEFT CAROTID: No stenosis.    No dissection. NASCET criteria was used to determine the degree of internal carotid artery diameter stenosis. CTA BRAIN: INTERNAL CAROTID ARTERIES: No stenosis or occlusion. ANTERIOR CEREBRAL ARTERY CIRCULATION:  No stenosis or occlusion. MIDDLE CEREBRAL ARTERY CIRCULATION:  No stenosis or occlusion. DISTAL VERTEBRAL ARTERIES:  No stenosis or occlusion. BASILAR ARTERY:  No stenosis or occlusion. POSTERIOR CEREBRAL ARTERIES: No stenosis or occlusion. VENOUS STRUCTURES:  Normal. NON VASCULAR ANATOMY BONY STRUCTURES:  No acute osseous abnormality. SOFT TISSUES OF THE NECK:  Normal. THORACIC INLET: Right upper lobe lung mass known to represent bronchogenic carcinoma.     Impression: CT Brain: Vasogenic edema left parietal and left occipital lobes similar to prior study in attributed to history of brain metastasis. Mass effect on the posterior aspect of the left lateral ventricle unchanged. CT Angiography:  Unremarkable CTA neck and brain. Workstation performed: [id number]     Procedure: MRI pelvis bony wo and w contrast  Result Date: 9/3/2024  Narrative: MRI BONY PELVIS WITH AND WITHOUT CONTRAST INDICATION:   C34.91: Malignant neoplasm of unspecified part of right bronchus or lung. FDG avid lesion in the right ischio anal fat seen on prior PET/CT. Follow-up for further characterization. Patient has history of non-small cell lung carcinoma, metastatic to mediastinal and hilar lymph nodes. COMPARISON: PET/CT dated August 5, 2024, CT chest abdomen pelvis dated July 3, 2024. TECHNIQUE:  Multiplanar/multisequence MR was obtained of the entire pelvis both pre and post IV contrast. IV Contrast:  4 mL of Gadobutrol injection (SINGLE-DOSE)  FINDINGS: SUBCUTANEOUS TISSUES: Normal PELVIC SOFT TISSUES: Soft tissue mass in the right ischioanal fat measuring 3.8 x 2.6 x 3.4 cm (5/33, 2/11), corresponding to the FDG avid lesion seen on prior PET/CT. It has hyperintense T2 weighted and hypointense T1-weighted signal with thick nodular and irregular enhancement predominantly along its periphery with hypoenhancement at the center. Lesion size and has increased in size from prior PET CT dated 8/5/2024 at which time it measured 2.7 x 2.0 cm and CT study dated 7/3/2024 at which time it measured 0.9 x 0.7 cm. Overall findings are suspicious for a malignant lesion, likely metastasis from patient's cancer. BONES:  No fracture, dislocation or destructive osseous lesion. ARTICULAR SURFACES:  Normal. VISUALIZED MUSCULATURE:  Unremarkable.     Impression: Enhancing soft tissue lesion in the right ischioanal fat measuring 3.8 x 2.6 x 3.4 cm (FDG avid on prior PET/CT), increased in size from 8/5/2024 and 7/3/2024. Findings are suspicious for a malignant lesion, likely a metastasis from patient's lung cancer. Consider definitive characterization with tissue sampling. Workstation performed: [id number]     Procedure: XR elbow 3+ vw left  Result Date: 8/27/2024  Narrative: [id number]    Procedure: XR ankle 3+ vw right  Result Date: 8/27/2024  Narrative: [id number]    Procedure: CT head without contrast  Result Date: 8/21/2024  Narrative: CT BRAIN - WITHOUT CONTRAST INDICATION:   History of lung cancer with metastatic brain lesions, dysmetria. COMPARISON: Head CT from 8/5/2024, prior brain MR from 7/30/2024, and prior head CT from 7/29/2024 TECHNIQUE:  CT examination of the brain was performed.  Multiplanar 2D reformatted images were created from the source data. Radiation dose length product (DLP) for this visit:  1003 mGy-cm .  This examination, like all CT scans performed in the [facility], was performed utilizing techniques to minimize radiation dose exposure, including the use of iterative reconstruction and automated exposure control. IMAGE QUALITY:  Diagnostic. FINDINGS: PARENCHYMA: There is worsening vasogenic edema involving the left parietal, and occipital lobes, related to the left occipital enhancing metastatic lesion better depicted on prior brain MRI. There is also worsening low attenuating vasogenic edema involving the subcortical parafalcine left frontal lobe, corresponding to the metastatic lesion in this region identified on prior brain MRI. No associated intraparenchymal hemorrhage is noted. No midline shift is noted. There are additional periventricular white matter low-attenuation areas involving both cerebral hemispheres, which statistically like represent mild chronic microangiopathic changes. VENTRICLES AND EXTRA-AXIAL SPACES: There is mass effect on the posterior body and trigone of the left lateral ventricle due to surrounding vasogenic edema. VISUALIZED ORBITS: Normal visualized orbits. PARANASAL SINUSES: Normal visualized paranasal sinuses. CALVARIUM AND EXTRACRANIAL SOFT TISSUES:  Normal.     Impression: 1. Worsening vasogenic edema in particular involving the left parietal and occipital lobes, corresponding to worsening vasogenic edema surrounding the patient's known left occipital lobe metastasis as better depicted on prior brain MRI. This results in mass effect on the posterior body and trigone of left lateral ventricle.. 2. Worsening vasogenic edema involving the parafalcine left frontal lobe at the site of the patient's known intracranial metastasis in this region as seen on prior brain MRI. 3. No intracranial hemorrhage noted. No midline shift. The study was marked in EPIC for immediate notification. 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 Multiplanar attenuation corrected and non attenuation corrected PET images are available for interpretation, and contiguous, low dose, axial CT sections were obtained from the skull vertex through the femurs. Intravenous contrast material was not utilized. This examination, like all CT scans performed in the [facility], was performed utilizing techniques to minimize radiation dose exposure, including the use of iterative reconstruction and automated exposure control. Fasting serum glucose: 78 mg/dl FINDINGS: VISUALIZED BRAIN: Focal FDG activity involving the left occipital lobe on PET image 53 with max SUV of 11.0 corresponding to patient's known metastatic lesion which was better characterized on MRI dated 7/30/2024; this finding is associated with adjacent regional photopenia which could be related to underlying vasogenic edema seen on MRI. Patient's known additional metastatic lesions seen on recent MRI are not definitively visualized on current PET/CT and could be obscured by normal background physiologic brain activity. HEAD/NECK: There is a physiologic distribution of FDG. No FDG avid cervical adenopathy is seen. CT images: Intracranial atherosclerotic calcification is noted. CHEST: Persistent hypermetabolic medial right upper lobe lung mass measuring 3.3 x 2.7 cm on image 132 of series 3 with max SUV of 10.0, previously measuring 3.7 x 2.9 cm with max SUV of 18.0 when utilizing similar measurement technique. On image 132 of series 3 there is a stable adjacent 7 mm right upper lobe lung nodule with max SUV of 2.1, previously similar in size with  max SUV of 1.7 Improving hypermetabolic right paratracheal mediastinal and right hilar yaw metastatic disease. For example, on image 137, hypermetabolic right paratracheal lymph node measures 7 mm in short axis with max SUV of 3.7, previously approximately 1.3 cm with max SUV of 13.0. Stable mildly FDG avid semisolid groundglass nodular opacity involving the left upper lobe measuring 2.6 x 1.1 cm on image 142 of series 3 with max SUV of 1.9, previously similar in size with max SUV of 2.2. CT images: Atherosclerotic vascular calcifications including those of the coronary arteries are noted. Emphysematous changes. ABDOMEN: No FDG avid soft tissue lesions are seen. CT images: Atherosclerotic vascular calcifications are noted. Tiny nonobstructing right renal calculus. PELVIS: On image 272 series 3 there is a new hypermetabolic soft tissue mass involving the fat in the right anorectal region medial to the right ischial tuberosity measuring 2.7 x 2.0 cm with max SUV of 9.9; this finding seems to be in an atypical distribution from patient's primary bronchogenic carcinoma and could reflect a separate malignant process. Further evaluation with tissue sampling is recommended for further characterization as clinically indicated. This finding has significantly increased in size since 7/3/2024 where it measured 1.0 cm. CT images: Unremarkable. OSSEOUS STRUCTURES: No FDG avid lesions are seen. CT images: Possible osteopenia. Degenerative changes are noted involving the spine.     Impression: 1. New hypermetabolic soft tissue mass involving the right anorectal fat medial to the right ischial tuberosity. While this finding could reflect hypermetabolic metastatic disease related to patient's primary bronchogenic carcinoma the distribution is atypical for metastatic disease from bronchogenic carcinoma. Therefore, consider further evaluation with tissue sampling to exclude secondary hypermetabolic malignancy. 2. Persistent  hypermetabolic right upper lobe bronchogenic carcinoma with hypermetabolic mediastinal and right hilar yaw metastatic disease which overall is improved since prior PET/CT. 3. Hypermetabolic left occipital lobe metastatic disease. Patient's known intracranial metastatic disease was better characterized on prior MRI of the brain. 4. Stable mildly FDG avid left upper lobe groundglass nodule suspicious for malignancy of low metabolic activity such as bronchoalveolar carcinoma. Please see above for details and additional findings. The study was marked in EPIC for significant notification. Workstation performed: [id number]     Procedure: EEG awake or drowsy routine  Result Date: 2024  Narrative: Table formatting from the original result was not included. ELECTROENCEPHALOGRAM (EEG) Patient Name:  Ayla Nye  MRN: [id number] :  1950 File #: [id number] Age: 73 y.o.  Date performed: 24        Report date: 2024      Study type: Routine EEG ICD 10 diagnosis: Transient neurological symptoms R29.818 Start time: 8:48 End time: 9:21 --------------------------------------------------------------------------- ---------------------------------------- Patient History: This recording was observed in a 73 y.o. female w/ metastatic NSCLC and multifocal brain metastases (including lefto frontal and occipital lobe) to determine whether spells of focal right motor and right hemianopia are seizures. Medications include: levetiracetam, dexamethasone --------------------------------------------------------------------------- ---------------------------------------- Description of Procedure: 32 channel digital recording with electrodes placed according to the International 10-20 system with additional T1/T2 electrodes, EOG, EKG, and simultaneous video. The recording was technically satisfactory. --------------------------------------------------------------------------- ---------------------------------------- EEG  Description: The recording was performed with the patient awake and drowsy. She was fully oriented. During wakefulness, there were long runs of well regulated, low amplitude, posteriorly dominant, asymmetric, 9.5-10.5 cps alpha rhythm that attenuated with eye opening which was better defined, higher amplitude and slightly faster on the right. There were symmetric low amplitude, frontally dominant beta activities. There were intermittent, very brief (1-2 seconds), left occipitally predominant, low to medium amplitude mixed frequency, polymorphic 5.5-8 cps theta and 2-3 cps delta activities. With drowsiness, alpha activity attenuated and was replaced by diffusely distributed theta activities. Deeper sleep was not captured. --------------------------------------------------------------------------- ---------------------------------------- Activation Procedures: Hyperventilation was not performed. Stepped photic stimulation between 1-30 cps was performed and induced bilateral photic driving. Other findings: The single lead ECG demonstrated regular rhythm --------------------------------------------------------------------------- ---------------------------------------- EEG Interpretation: This Routine EEG recorded during wakefulness and drowsiness is abnormal. Intermittent, left occipital predominant, mixed theta and delta slowing suggests left occipital focal neuronal dysfunction. Yulissa Eddy MD Clinical Neurophysiology Fellow [facility] Loco Crooks MD Attending Epileptologist [facility] [place]    Procedure: Echo complete w/ contrast if indicated  Result Date: 7/30/2024  Narrative:   Left Ventricle: Left ventricular cavity size is normal. Wall thickness is normal. The left ventricular ejection fraction is 60%. Systolic function is normal. Wall motion is normal. Diastolic function is normal.   Right Ventricle: Right ventricular  cavity size is mildly dilated. Systolic function is normal.   Atrial Septum: There is a patent foramen ovale.  Bidirectional shunting with bubbles visualized in the LV.   Mitral Valve: There is mild annular calcification.   Tricuspid Valve: There is mild regurgitation. The estimated right ventricular systolic pressure is 29.00 mmHg.     Procedure: MRI brain w wo contrast  Result Date: 7/30/2024  Narrative: MRI BRAIN WITH AND WITHOUT CONTRAST INDICATION: Dizziness. COMPARISON: CT head 7/29/2024. TECHNIQUE: Multiplanar, multisequence imaging of the brain was performed before and after gadolinium administration. IV Contrast:  5 mL of Gadobutrol injection (SINGLE-DOSE) IMAGE QUALITY:   Diagnostic. FINDINGS: BRAIN PARENCHYMA: No acute infarct or midline shift. Multiple enhancing lesions are seen supratentorially and infratentorially, with associated vasogenic edema. Lesions as referenced below: 1. 2.0 x 1.4 cm lesion in the left occipital lobe on series 10, image 16 with adjacent vasogenic edema involving the left occipital lobe and extending into the left periatrial white matter, where there is mild mass effect on the adjacent lateral ventricle. There is an internal, diffusion restricting 8 mm component. 2. 4 mm enhancing lesion in the left paramedian frontal lobe on series 10, image 20 with mild adjacent mass effect and vasogenic edema and a small focus of susceptibility likely reflective of hemorrhage. 3. 3 mm lesion in the left paramedian parietal lobe on series 10, image 20 with mild mass effect and vasogenic edema. 4. 3 mm lesion in the midline cerebellum on series 10, image 9 with adjacent vasogenic edema which results in mild mass effect on the fourth ventricle. 5. 2 mm lesion in the left frontal lobe (series 11, image 129) VENTRICLES:  Normal for the patient's age. SELLA AND PITUITARY GLAND:  Normal. ORBITS: No acute abnormality. PARANASAL SINUSES: Essentially clear. VASCULATURE:  Evaluation of the major  intracranial vasculature demonstrates appropriate flow voids. CALVARIUM AND SKULL BASE: No acute abnormality. EXTRACRANIAL SOFT TISSUES: No acute abnormality.     Impression: 1. Multiple supratentorial and infratentorial enhancing lesions with associated vasogenic edema, the largest of which is in the left occipital lobe. Findings are most consistent with metastases. 2. No acute infarct or midline shift. The study was marked in EPIC for immediate notification. Workstation performed: [id number]     Procedure: CT stroke alert brain  Result Date: 7/29/2024  Narrative: CT BRAIN - STROKE ALERT PROTOCOL INDICATION:   Stroke Alert. COMPARISON: 3/28/2024. TECHNIQUE:  CT examination of the brain was performed.  In addition to axial images, coronal reformatted images were created and submitted for interpretation. Radiation dose length product (DLP) for this visit:  912 mGy-cm .  This examination, like all CT scans performed in the [facility], was performed utilizing techniques to minimize radiation dose exposure, including the use of iterative reconstruction and automated exposure control. IMAGE QUALITY:  Diagnostic. FINDINGS: PARENCHYMA: Area of hypoattenuation in the left superior frontal region adjacent to the interhemispheric fissure with preservation of the cortex, possibly indicating vasogenic edema.. Periventricular and subcortical hypoattenuating foci consistent with microangiopathic disease. No acute intracranial hemorrhage or mass effect. VENTRICLES AND EXTRA-AXIAL SPACES: No hydrocephalus or extra-axial collection. VISUALIZED ORBITS: Intact. PARANASAL SINUSES: Clear. CALVARIUM AND EXTRACRANIAL SOFT TISSUES:   No lytic or blastic lesion or fracture.     Impression: 1. Small area of hypoattenuation in the left superior frontal lobe and larger area in the left posterior parietal region, suggestive of vasogenic edema. Findings are concerning for occult underlying metastases. Acute infarcts cannot be  excluded however infarcts in 2 vascular territories and with findings suggesting subacute infarcts, not agreement with the clinical timeline suggest stroke is less likely. 2. No intracranial hemorrhage. Findings were directly discussed with Percy Montalvo  at    8:20 PM . Workstation performed: [id number]     Procedure: CTA stroke alert (head/neck)  Result Date: 7/29/2024  Narrative: CTA NECK AND BRAIN WITH AND WITHOUT CONTRAST INDICATION: Stroke Alert COMPARISON: 3/28/2024 TECHNIQUE:  Post contrast imaging was performed after administration of iodinated contrast through the neck and brain. Post contrast axial 0.625 mm images timed to opacify the arterial system.  3D rendering was performed on an independent workstation.   MIP reconstructions performed. Coronal and sagittal reconstructions were performed of the non contrast portion of the brain. Radiation dose length product (DLP) for this visit:  338 mGy-cm .  This examination, like all CT scans performed in the [facility], was performed utilizing techniques to minimize radiation dose exposure, including the use of iterative reconstruction and automated exposure control. IV Contrast:  85 mL of iohexol (OMNIPAQUE) IMAGE QUALITY:   Diagnostic FINDINGS: CTA NECK ARCH AND GREAT VESSELS: Mild atherosclerotic plaque in the aortic arch. No stenosis in the subclavian arteries. VERTEBRAL ARTERIES: Left vertebral artery arises directly from the aortic arch. No stenosis. RIGHT CAROTID: No stenosis.    No dissection. LEFT CAROTID: No stenosis.    No dissection. NASCET criteria was used to determine the degree of internal carotid artery diameter stenosis. CTA BRAIN: INTERNAL CAROTID ARTERIES: Minimal calcified plaque in the carotid siphons without hemodynamically significant stenosis. ANTERIOR CEREBRAL ARTERY CIRCULATION:  No stenosis or occlusion. MIDDLE CEREBRAL ARTERY CIRCULATION:  No stenosis or occlusion. DISTAL VERTEBRAL ARTERIES:  No stenosis or occlusion.  "BASILAR ARTERY:  No stenosis or occlusion. POSTERIOR CEREBRAL ARTERIES: No stenosis or occlusion. VENOUS STRUCTURES: Patent dural venous sinuses. NON VASCULAR ANATOMY BONY STRUCTURES:  No acute osseous abnormality. SOFT TISSUES OF THE NECK: Subcentimeter cystic lesions in the thyroid. THORACIC INLET: Partially visualized mediastinal lymphadenopathy, stable. Partially visualized right upper lobe mass, not significantly changed.     Impression: No hemodynamically significant stenosis, dissection or occlusion of the carotid or vertebral arteries or major vessels of the [place].. Findings were directly discussed with Percy Montalvo  at  8:29 PM  . Workstation performed: [id number]       35 minutes total time spent on 11/13/2024 in caring for this patient including obtaining/reviewing history, symptom assessment and management, medication review / adjustment, psychosocial support, reviewing / ordering tests, medicines, imaging, procedures, supportive listening, anticipatory guidance, patient/family education, and documenting in the medical record. All of the patient's questions were answered during this discussion.    ZULEYMA Pollard  [facility]  [phone]    Portions of this document may have been created using dictation software and as such some \"sound alike\" terms may have been generated by the system. Do not hesitate to contact me with any questions or clarifications.     "

## 2024-11-13 NOTE — ASSESSMENT & PLAN NOTE
Ayla follows with palliative care for psychosocial support and symptom management of lung cancer with brain mets, perianal tumor   Symptoms - pain, sleep trouble- Pain is much better controlled.    ACP -  on file   RTC - 4 weeks    Lives alone.   Has two daughters .   Ex- is supportive and involved in care  Has brother that lives close by that helps as well.     Daughters are getting a life alert set up for patient.     No longer drives.

## 2024-11-14 ENCOUNTER — HOSPITAL ENCOUNTER (OUTPATIENT)
Dept: INFUSION CENTER | Facility: HOSPITAL | Age: 74
Discharge: HOME | End: 2024-11-14
Attending: INTERNAL MEDICINE
Payer: MEDICARE

## 2024-11-14 VITALS
SYSTOLIC BLOOD PRESSURE: 135 MMHG | WEIGHT: 115.96 LBS | HEART RATE: 74 BPM | DIASTOLIC BLOOD PRESSURE: 68 MMHG | TEMPERATURE: 98 F | RESPIRATION RATE: 18 BRPM | HEIGHT: 62 IN | OXYGEN SATURATION: 98 % | BODY MASS INDEX: 21.34 KG/M2

## 2024-11-14 DIAGNOSIS — C34.11 MALIGNANT NEOPLASM OF UPPER LOBE, RIGHT BRONCHUS OR LUNG (HCC): Primary | ICD-10-CM

## 2024-11-14 PROCEDURE — 96411 CHEMO IV PUSH ADDL DRUG: CPT

## 2024-11-14 PROCEDURE — 96413 CHEMO IV INFUSION 1 HR: CPT

## 2024-11-14 PROCEDURE — 96417 CHEMO IV INFUS EACH ADDL SEQ: CPT

## 2024-11-14 PROCEDURE — 96367 TX/PROPH/DG ADDL SEQ IV INF: CPT

## 2024-11-14 RX ORDER — SODIUM CHLORIDE 9 MG/ML
20 INJECTION, SOLUTION INTRAVENOUS ONCE
Status: COMPLETED | OUTPATIENT
Start: 2024-11-14 | End: 2024-11-14

## 2024-11-14 RX ADMIN — SODIUM CHLORIDE 20 ML/HR: 0.9 INJECTION, SOLUTION INTRAVENOUS at 11:36

## 2024-11-14 RX ADMIN — CARBOPLATIN 346 MG: 10 INJECTION, SOLUTION INTRAVENOUS at 13:44

## 2024-11-14 RX ADMIN — DEXAMETHASONE SODIUM PHOSPHATE: 10 INJECTION, SOLUTION INTRAMUSCULAR; INTRAVENOUS at 11:37

## 2024-11-14 RX ADMIN — PEMETREXED DISODIUM 700 MG: 500 INJECTION, POWDER, LYOPHILIZED, FOR SOLUTION INTRAVENOUS at 13:27

## 2024-11-14 RX ADMIN — SODIUM CHLORIDE 200 MG: 9 INJECTION, SOLUTION INTRAVENOUS at 12:34

## 2024-11-14 RX ADMIN — FOSAPREPITANT 150 MG: 150 INJECTION, POWDER, LYOPHILIZED, FOR SOLUTION INTRAVENOUS at 12:01

## 2024-11-14 NOTE — PROGRESS NOTES
Ayla Nye  tolerated treatment well with no complications.      Ayla Nye is aware of future appt on 12/5 at 0900.     AVS printed and given to Ayla Nye:  Yes

## 2024-11-18 ENCOUNTER — TELEPHONE (OUTPATIENT)
Facility: HOSPITAL | Age: 74
End: 2024-11-18

## 2024-11-18 ENCOUNTER — TELEPHONE (OUTPATIENT)
Dept: NEUROLOGY | Facility: CLINIC | Age: 74
End: 2024-11-18

## 2024-11-18 NOTE — TELEPHONE ENCOUNTER
Spoke with patient directly.  Reviewed Dr. Gamboa recommendation to increase decadron to 2mg BID for 7 days then decrease to 2 mg. Daily.  Advised her to seek care immediately for sudden or significant neurologic change.  States she does not need refills of medication at present time.  Verbalized understanding of all instruction given

## 2024-11-19 ENCOUNTER — PATIENT OUTREACH (OUTPATIENT)
Dept: HEMATOLOGY ONCOLOGY | Facility: CLINIC | Age: 74
End: 2024-11-19

## 2024-11-19 DIAGNOSIS — C79.31 METASTATIC CANCER TO BRAIN (HCC): ICD-10-CM

## 2024-11-19 NOTE — PROGRESS NOTES
I reached out to Ayla now that she is on Tx to reassess for any barriers to care and offer any supportive services that may be needed. I left  with reason for my call including my direct phone number [phone]

## 2024-11-19 NOTE — PLAN OF CARE
Problem: Potential for Falls  Goal: Patient will remain free of falls  Description: INTERVENTIONS:  - Educate patient/family on patient safety including physical limitations  - Instruct patient to call for assistance with activity   - Keep Call bell within reach  - Keep care items and personal belongings within reach  Outcome: Progressing      Caller: Tahira Ramirez    Relationship: Self    Best call back number: [phone]    Requested Prescriptions:   OXYCODONE     Pharmacy where request should be sent: [id number] - [place] [id number] - [phone]  - [phone] FX     Last office visit with prescribing clinician: 11/15/2024   Last telemedicine visit with prescribing clinician: Visit date not found   Next office visit with prescribing clinician: 2/13/2025     Additional details provided by patient: PT IS COMPLETELY OUT, WOULD LIKE TO SEND TO [facility] INSTEAD OF [facility]. PLEASE CALL PT ONCE [facility]    Does the patient have less than a 3 day supply:  [x] Yes  [] No    Would you like a call back once the refill request has been completed: [x] Yes [] No    If the office needs to give you a call back, can they leave a voicemail: [x] Yes [] No    Ankur Koroma   11/19/24 09:04 EST

## 2024-11-20 NOTE — TELEPHONE ENCOUNTER
Patient called back to confirm the date and time as 11/29/24 /[place] office at 12:30 pm  60 minute HFU

## 2024-11-20 NOTE — TELEPHONE ENCOUNTER
Hello Good Morning ,     Please see HFU encounter 10/23/2024 as Pt neeeds an HFU Long vs an OVS.    Called pt no answer, lmom to offer 11/29/2024, hfu long at 12:30 pm.    Thank you,     Bev

## 2024-11-21 ENCOUNTER — TELEPHONE (OUTPATIENT)
Age: 74
End: 2024-11-21

## 2024-11-21 DIAGNOSIS — G89.3 CANCER RELATED PAIN: ICD-10-CM

## 2024-11-21 DIAGNOSIS — C79.31 METASTATIC CANCER TO BRAIN (HCC): ICD-10-CM

## 2024-11-21 DIAGNOSIS — Z51.5 PALLIATIVE CARE PATIENT: ICD-10-CM

## 2024-11-21 DIAGNOSIS — C34.11 MALIGNANT NEOPLASM OF UPPER LOBE, RIGHT BRONCHUS OR LUNG (HCC): ICD-10-CM

## 2024-11-21 NOTE — TELEPHONE ENCOUNTER
Spoke with patient and she needs these medication filled that are pended in this encounter.     She does not need gabapentin another provider is taking care of it.

## 2024-11-21 NOTE — TELEPHONE ENCOUNTER
Please call patient back;she needs refills from medication she got in the hospital and Dr Angelo told her to call the office when she needed refills.  Please alicia her @ [phone]

## 2024-11-22 RX ORDER — GABAPENTIN 100 MG/1
100 CAPSULE ORAL
Qty: 60 CAPSULE | Refills: 0 | Status: SHIPPED | OUTPATIENT
Start: 2024-11-22 | End: 2024-12-22

## 2024-11-22 RX ORDER — PANTOPRAZOLE SODIUM 40 MG/1
40 TABLET, DELAYED RELEASE ORAL
Qty: 30 TABLET | Refills: 0 | Status: SHIPPED | OUTPATIENT
Start: 2024-11-22 | End: 2024-12-22

## 2024-11-22 RX ORDER — LEVETIRACETAM 1000 MG/1
1000 TABLET ORAL EVERY 12 HOURS SCHEDULED
Qty: 60 TABLET | Refills: 0 | Status: SHIPPED | OUTPATIENT
Start: 2024-11-22 | End: 2024-12-22

## 2024-11-22 RX ORDER — FOLIC ACID 1 MG/1
1 TABLET ORAL DAILY
Qty: 30 TABLET | Refills: 6 | Status: SHIPPED | OUTPATIENT
Start: 2024-11-22

## 2024-11-24 ENCOUNTER — APPOINTMENT (EMERGENCY)
Dept: RADIOLOGY | Facility: HOSPITAL | Age: 74
DRG: 871 | End: 2024-11-24
Payer: MEDICARE

## 2024-11-24 ENCOUNTER — APPOINTMENT (EMERGENCY)
Dept: CT IMAGING | Facility: HOSPITAL | Age: 74
DRG: 871 | End: 2024-11-24
Payer: MEDICARE

## 2024-11-24 ENCOUNTER — HOSPITAL ENCOUNTER (INPATIENT)
Facility: HOSPITAL | Age: 74
LOS: 11 days | Discharge: HOME | DRG: 871 | End: 2024-12-06
Attending: EMERGENCY MEDICINE | Admitting: INTERNAL MEDICINE
Payer: MEDICARE

## 2024-11-24 DIAGNOSIS — I26.99 BILATERAL PULMONARY EMBOLISM (HCC): Primary | ICD-10-CM

## 2024-11-24 DIAGNOSIS — D70.9 NEUTROPENIA (HCC): ICD-10-CM

## 2024-11-24 DIAGNOSIS — A41.9 SEPSIS (HCC): ICD-10-CM

## 2024-11-24 DIAGNOSIS — B59 PNEUMONIA OF RIGHT UPPER LOBE DUE TO PNEUMOCYSTIS JIROVECII (HCC): ICD-10-CM

## 2024-11-24 DIAGNOSIS — C79.31 METASTATIC CANCER TO BRAIN (HCC): ICD-10-CM

## 2024-11-24 DIAGNOSIS — D84.9 IMMUNOCOMPROMISED (HCC): ICD-10-CM

## 2024-11-24 DIAGNOSIS — I26.99 ACUTE PULMONARY EMBOLISM (HCC): ICD-10-CM

## 2024-11-24 DIAGNOSIS — J96.01 ACUTE HYPOXIC RESPIRATORY FAILURE (HCC): ICD-10-CM

## 2024-11-24 DIAGNOSIS — J18.9 PNEUMONIA: ICD-10-CM

## 2024-11-24 LAB
2HR DELTA HS TROPONIN: 0 NG/L
ALBUMIN SERPL BCG-MCNC: 3.8 G/DL (ref 3.5–5)
ALP SERPL-CCNC: 41 U/L (ref 34–104)
ALT SERPL W P-5'-P-CCNC: 12 U/L (ref 7–52)
ANION GAP SERPL CALCULATED.3IONS-SCNC: 11 MMOL/L (ref 4–13)
ANISOCYTOSIS BLD QL SMEAR: PRESENT
APTT PPP: 29 SECONDS (ref 23–34)
AST SERPL W P-5'-P-CCNC: 10 U/L (ref 13–39)
BACTERIA UR QL AUTO: ABNORMAL /HPF
BASOPHILS # BLD MANUAL: 0.02 THOUSAND/UL (ref 0–0.1)
BASOPHILS NFR MAR MANUAL: 1 % (ref 0–1)
BILIRUB SERPL-MCNC: 0.27 MG/DL (ref 0.2–1)
BILIRUB UR QL STRIP: NEGATIVE
BUN SERPL-MCNC: 22 MG/DL (ref 5–25)
CALCIUM SERPL-MCNC: 9.1 MG/DL (ref 8.4–10.2)
CARDIAC TROPONIN I PNL SERPL HS: 5 NG/L (ref ?–50)
CARDIAC TROPONIN I PNL SERPL HS: 5 NG/L (ref ?–50)
CHLORIDE SERPL-SCNC: 105 MMOL/L (ref 96–108)
CLARITY UR: CLEAR
CO2 SERPL-SCNC: 23 MMOL/L (ref 21–32)
COLOR UR: YELLOW
CREAT SERPL-MCNC: 0.99 MG/DL (ref 0.6–1.3)
D DIMER PPP FEU-MCNC: 1.57 UG/ML FEU
EOSINOPHIL # BLD MANUAL: 0.05 THOUSAND/UL (ref 0–0.4)
EOSINOPHIL NFR BLD MANUAL: 2 % (ref 0–6)
ERYTHROCYTE [DISTWIDTH] IN BLOOD BY AUTOMATED COUNT: 15.1 % (ref 11.6–15.1)
FLUAV AG UPPER RESP QL IA.RAPID: NEGATIVE
FLUBV AG UPPER RESP QL IA.RAPID: NEGATIVE
GFR SERPL CREATININE-BSD FRML MDRD: 56 ML/MIN/1.73SQ M
GLUCOSE SERPL-MCNC: 129 MG/DL (ref 65–140)
GLUCOSE UR STRIP-MCNC: NEGATIVE MG/DL
HCT VFR BLD AUTO: 24.6 % (ref 34.8–46.1)
HGB BLD-MCNC: 8 G/DL (ref 11.5–15.4)
HGB UR QL STRIP.AUTO: ABNORMAL
HOLD SPECIMEN: NORMAL
HOLD SPECIMEN: NORMAL
INR PPP: 0.91 (ref 0.85–1.19)
KETONES UR STRIP-MCNC: ABNORMAL MG/DL
LACTATE SERPL-SCNC: 1.4 MMOL/L (ref 0.5–2)
LEUKOCYTE ESTERASE UR QL STRIP: ABNORMAL
LYMPHOCYTES # BLD AUTO: 0.25 THOUSAND/UL (ref 0.6–4.47)
LYMPHOCYTES # BLD AUTO: 11 % (ref 14–44)
MAGNESIUM SERPL-MCNC: 1.7 MG/DL (ref 1.9–2.7)
MCH RBC QN AUTO: 32.5 PG (ref 26.8–34.3)
MCHC RBC AUTO-ENTMCNC: 32.5 G/DL (ref 31.4–37.4)
MCV RBC AUTO: 100 FL (ref 82–98)
METAMYELOCYTE ABSOLUTE CT: 0.05 THOUSAND/UL (ref 0–0.1)
METAMYELOCYTES NFR BLD MANUAL: 2 % (ref 0–1)
MONOCYTES # BLD AUTO: 0.32 THOUSAND/UL (ref 0–1.22)
MONOCYTES NFR BLD: 14 % (ref 4–12)
MYELOCYTE ABSOLUTE CT: 0.05 THOUSAND/UL (ref 0–0.1)
MYELOCYTES NFR BLD MANUAL: 2 % (ref 0–1)
NEUTROPHILS # BLD MANUAL: 1.54 THOUSAND/UL (ref 1.85–7.62)
NEUTS BAND NFR BLD MANUAL: 2 % (ref 0–8)
NEUTS SEG NFR BLD AUTO: 66 % (ref 43–75)
NITRITE UR QL STRIP: NEGATIVE
NON-SQ EPI CELLS URNS QL MICRO: ABNORMAL /HPF
PH UR STRIP.AUTO: 5.5 [PH]
PLATELET # BLD AUTO: 116 THOUSANDS/UL (ref 149–390)
PLATELET BLD QL SMEAR: ABNORMAL
PMV BLD AUTO: 9.5 FL (ref 8.9–12.7)
POLYCHROMASIA BLD QL SMEAR: PRESENT
POTASSIUM SERPL-SCNC: 3.8 MMOL/L (ref 3.5–5.3)
PROCALCITONIN SERPL-MCNC: 0.12 NG/ML
PROT SERPL-MCNC: 6.7 G/DL (ref 6.4–8.4)
PROT UR STRIP-MCNC: ABNORMAL MG/DL
PROTHROMBIN TIME: 13 SECONDS (ref 12.3–15)
RBC # BLD AUTO: 2.46 MILLION/UL (ref 3.81–5.12)
RBC #/AREA URNS AUTO: ABNORMAL /HPF
RBC MORPH BLD: PRESENT
SARS-COV+SARS-COV-2 AG RESP QL IA.RAPID: NEGATIVE
SODIUM SERPL-SCNC: 139 MMOL/L (ref 135–147)
SP GR UR STRIP.AUTO: 1.03 (ref 1–1.03)
TSH SERPL DL<=0.05 MIU/L-ACNC: 1.11 UIU/ML (ref 0.45–4.5)
UROBILINOGEN UR STRIP-ACNC: <2 MG/DL
WBC # BLD AUTO: 2.26 THOUSAND/UL (ref 4.31–10.16)
WBC #/AREA URNS AUTO: ABNORMAL /HPF

## 2024-11-24 PROCEDURE — 96361 HYDRATE IV INFUSION ADD-ON: CPT

## 2024-11-24 PROCEDURE — 93005 ELECTROCARDIOGRAM TRACING: CPT

## 2024-11-24 PROCEDURE — 83735 ASSAY OF MAGNESIUM: CPT | Performed by: EMERGENCY MEDICINE

## 2024-11-24 PROCEDURE — 83605 ASSAY OF LACTIC ACID: CPT

## 2024-11-24 PROCEDURE — 87040 BLOOD CULTURE FOR BACTERIA: CPT

## 2024-11-24 PROCEDURE — 36415 COLL VENOUS BLD VENIPUNCTURE: CPT

## 2024-11-24 PROCEDURE — 80053 COMPREHEN METABOLIC PANEL: CPT | Performed by: EMERGENCY MEDICINE

## 2024-11-24 PROCEDURE — 81001 URINALYSIS AUTO W/SCOPE: CPT | Performed by: EMERGENCY MEDICINE

## 2024-11-24 PROCEDURE — 87811 SARS-COV-2 COVID19 W/OPTIC: CPT

## 2024-11-24 PROCEDURE — 85730 THROMBOPLASTIN TIME PARTIAL: CPT | Performed by: EMERGENCY MEDICINE

## 2024-11-24 PROCEDURE — 99285 EMERGENCY DEPT VISIT HI MDM: CPT

## 2024-11-24 PROCEDURE — 87086 URINE CULTURE/COLONY COUNT: CPT | Performed by: EMERGENCY MEDICINE

## 2024-11-24 PROCEDURE — 87804 INFLUENZA ASSAY W/OPTIC: CPT

## 2024-11-24 PROCEDURE — 84484 ASSAY OF TROPONIN QUANT: CPT | Performed by: EMERGENCY MEDICINE

## 2024-11-24 PROCEDURE — 71275 CT ANGIOGRAPHY CHEST: CPT

## 2024-11-24 PROCEDURE — 74177 CT ABD & PELVIS W/CONTRAST: CPT

## 2024-11-24 PROCEDURE — 85027 COMPLETE CBC AUTOMATED: CPT | Performed by: EMERGENCY MEDICINE

## 2024-11-24 PROCEDURE — 71046 X-RAY EXAM CHEST 2 VIEWS: CPT

## 2024-11-24 PROCEDURE — 85379 FIBRIN DEGRADATION QUANT: CPT | Performed by: EMERGENCY MEDICINE

## 2024-11-24 PROCEDURE — 85007 BL SMEAR W/DIFF WBC COUNT: CPT | Performed by: EMERGENCY MEDICINE

## 2024-11-24 PROCEDURE — 96365 THER/PROPH/DIAG IV INF INIT: CPT

## 2024-11-24 PROCEDURE — 70450 CT HEAD/BRAIN W/O DYE: CPT

## 2024-11-24 PROCEDURE — 83880 ASSAY OF NATRIURETIC PEPTIDE: CPT

## 2024-11-24 PROCEDURE — 84443 ASSAY THYROID STIM HORMONE: CPT | Performed by: EMERGENCY MEDICINE

## 2024-11-24 PROCEDURE — 84145 PROCALCITONIN (PCT): CPT | Performed by: EMERGENCY MEDICINE

## 2024-11-24 PROCEDURE — 85610 PROTHROMBIN TIME: CPT | Performed by: EMERGENCY MEDICINE

## 2024-11-24 PROCEDURE — 99285 EMERGENCY DEPT VISIT HI MDM: CPT | Performed by: EMERGENCY MEDICINE

## 2024-11-24 RX ORDER — LEVOFLOXACIN 5 MG/ML
750 INJECTION, SOLUTION INTRAVENOUS ONCE
Status: COMPLETED | OUTPATIENT
Start: 2024-11-24 | End: 2024-11-25

## 2024-11-24 RX ADMIN — IOHEXOL 85 ML: 350 INJECTION, SOLUTION INTRAVENOUS at 23:50

## 2024-11-24 RX ADMIN — LEVOFLOXACIN 750 MG: 750 INJECTION, SOLUTION INTRAVENOUS at 23:18

## 2024-11-24 RX ADMIN — SODIUM CHLORIDE 1000 ML: 0.9 INJECTION, SOLUTION INTRAVENOUS at 22:37

## 2024-11-25 ENCOUNTER — APPOINTMENT (INPATIENT)
Dept: NON INVASIVE DIAGNOSTICS | Facility: HOSPITAL | Age: 74
DRG: 871 | End: 2024-11-25
Payer: MEDICARE

## 2024-11-25 PROBLEM — I26.99 ACUTE PULMONARY EMBOLISM (HCC): Status: ACTIVE | Noted: 2024-11-25

## 2024-11-25 PROBLEM — J18.9 PNEUMONIA: Status: ACTIVE | Noted: 2024-11-25

## 2024-11-25 PROBLEM — D70.9 NEUTROPENIA (HCC): Status: ACTIVE | Noted: 2024-11-25

## 2024-11-25 LAB
ANION GAP SERPL CALCULATED.3IONS-SCNC: 9 MMOL/L (ref 4–13)
ANISOCYTOSIS BLD QL SMEAR: PRESENT
AORTIC ROOT: 3.1 CM
APICAL FOUR CHAMBER EJECTION FRACTION: 66 %
APTT PPP: 59 SECONDS (ref 23–34)
APTT PPP: 61 SECONDS (ref 23–34)
APTT PPP: 94 SECONDS (ref 23–34)
ASCENDING AORTA: 3.3 CM
ATRIAL RATE: 101 BPM
BASOPHILS # BLD MANUAL: 0 THOUSAND/UL (ref 0–0.1)
BASOPHILS NFR MAR MANUAL: 0 % (ref 0–1)
BNP SERPL-MCNC: 47 PG/ML (ref 0–100)
BSA FOR ECHO PROCEDURE: 1.51 M2
BUN SERPL-MCNC: 16 MG/DL (ref 5–25)
CALCIUM SERPL-MCNC: 8.9 MG/DL (ref 8.4–10.2)
CHLORIDE SERPL-SCNC: 103 MMOL/L (ref 96–108)
CO2 SERPL-SCNC: 24 MMOL/L (ref 21–32)
CREAT SERPL-MCNC: 0.81 MG/DL (ref 0.6–1.3)
E WAVE DECELERATION TIME: 211 MS
E/A RATIO: 0.68
EOSINOPHIL # BLD MANUAL: 0.02 THOUSAND/UL (ref 0–0.4)
EOSINOPHIL NFR BLD MANUAL: 1 % (ref 0–6)
ERYTHROCYTE [DISTWIDTH] IN BLOOD BY AUTOMATED COUNT: 15.6 % (ref 11.6–15.1)
FRACTIONAL SHORTENING: 36 (ref 28–44)
GFR SERPL CREATININE-BSD FRML MDRD: 71 ML/MIN/1.73SQ M
GLUCOSE SERPL-MCNC: 82 MG/DL (ref 65–140)
HCT VFR BLD AUTO: 23.1 % (ref 34.8–46.1)
HGB BLD-MCNC: 7.3 G/DL (ref 11.5–15.4)
INTERVENTRICULAR SEPTUM IN DIASTOLE (PARASTERNAL SHORT AXIS VIEW): 0.8 CM
INTERVENTRICULAR SEPTUM: 0.8 CM (ref 0.6–1.1)
LAAS-AP2: 19 CM2
LAAS-AP4: 14.6 CM2
LEFT ATRIUM SIZE: 3.5 CM
LEFT ATRIUM VOLUME (MOD BIPLANE): 45 ML
LEFT ATRIUM VOLUME INDEX (MOD BIPLANE): 29.8 ML/M2
LEFT INTERNAL DIMENSION IN SYSTOLE: 2.5 CM (ref 2.1–4)
LEFT VENTRICULAR INTERNAL DIMENSION IN DIASTOLE: 3.9 CM (ref 3.5–6)
LEFT VENTRICULAR POSTERIOR WALL IN END DIASTOLE: 0.9 CM
LEFT VENTRICULAR STROKE VOLUME: 45 ML
LVSV (TEICH): 45 ML
LYMPHOCYTES # BLD AUTO: 0.39 THOUSAND/UL (ref 0.6–4.47)
LYMPHOCYTES # BLD AUTO: 18 % (ref 14–44)
MAGNESIUM SERPL-MCNC: 2.5 MG/DL (ref 1.9–2.7)
MCH RBC QN AUTO: 32.6 PG (ref 26.8–34.3)
MCHC RBC AUTO-ENTMCNC: 31.6 G/DL (ref 31.4–37.4)
MCV RBC AUTO: 103 FL (ref 82–98)
MONOCYTES # BLD AUTO: 0.06 THOUSAND/UL (ref 0–1.22)
MONOCYTES NFR BLD: 3 % (ref 4–12)
MV E'TISSUE VEL-SEP: 7 CM/S
MV PEAK A VEL: 0.96 M/S
MV PEAK E VEL: 65 CM/S
MV STENOSIS PRESSURE HALF TIME: 61 MS
MV VALVE AREA P 1/2 METHOD: 3.61
MYELOCYTE ABSOLUTE CT: 0.02 THOUSAND/UL (ref 0–0.1)
MYELOCYTES NFR BLD MANUAL: 1 % (ref 0–1)
NEUTROPHILS # BLD MANUAL: 1.66 THOUSAND/UL (ref 1.85–7.62)
NEUTS BAND NFR BLD MANUAL: 2 % (ref 0–8)
NEUTS SEG NFR BLD AUTO: 75 % (ref 43–75)
P AXIS: 66 DEGREES
PLATELET # BLD AUTO: 114 THOUSANDS/UL (ref 149–390)
PLATELET BLD QL SMEAR: ABNORMAL
PMV BLD AUTO: 10.5 FL (ref 8.9–12.7)
POLYCHROMASIA BLD QL SMEAR: PRESENT
POTASSIUM SERPL-SCNC: 3.5 MMOL/L (ref 3.5–5.3)
PR INTERVAL: 128 MS
PROCALCITONIN SERPL-MCNC: 0.1 NG/ML
QRS AXIS: 59 DEGREES
QRSD INTERVAL: 64 MS
QT INTERVAL: 334 MS
QTC INTERVAL: 433 MS
RA PRESSURE ESTIMATED: 3 MMHG
RBC # BLD AUTO: 2.24 MILLION/UL (ref 3.81–5.12)
RBC MORPH BLD: PRESENT
RIGHT ATRIUM AREA SYSTOLE A4C: 10.6 CM2
RIGHT VENTRICLE ID DIMENSION: 2.4 CM
RV PSP: 31 MMHG
SL CV LEFT ATRIUM LENGTH A2C: 5.7 CM
SL CV LV EF: 65
SL CV PED ECHO LEFT VENTRICLE DIASTOLIC VOLUME (MOD BIPLANE) 2D: 67 ML
SL CV PED ECHO LEFT VENTRICLE SYSTOLIC VOLUME (MOD BIPLANE) 2D: 22 ML
SODIUM SERPL-SCNC: 136 MMOL/L (ref 135–147)
T WAVE AXIS: 55 DEGREES
TR MAX PG: 28 MMHG
TR PEAK VELOCITY: 2.7 M/S
TRICUSPID ANNULAR PLANE SYSTOLIC EXCURSION: 1.9 CM
TRICUSPID VALVE PEAK REGURGITATION VELOCITY: 2.65 M/S
VENTRICULAR RATE: 101 BPM
WBC # BLD AUTO: 2.16 THOUSAND/UL (ref 4.31–10.16)

## 2024-11-25 PROCEDURE — 99223 1ST HOSP IP/OBS HIGH 75: CPT | Performed by: INTERNAL MEDICINE

## 2024-11-25 PROCEDURE — 99222 1ST HOSP IP/OBS MODERATE 55: CPT | Performed by: INTERNAL MEDICINE

## 2024-11-25 PROCEDURE — 96367 TX/PROPH/DG ADDL SEQ IV INF: CPT

## 2024-11-25 PROCEDURE — 87081 CULTURE SCREEN ONLY: CPT

## 2024-11-25 PROCEDURE — 36415 COLL VENOUS BLD VENIPUNCTURE: CPT

## 2024-11-25 PROCEDURE — 93306 TTE W/DOPPLER COMPLETE: CPT | Performed by: INTERNAL MEDICINE

## 2024-11-25 PROCEDURE — 85027 COMPLETE CBC AUTOMATED: CPT

## 2024-11-25 PROCEDURE — 93010 ELECTROCARDIOGRAM REPORT: CPT | Performed by: INTERNAL MEDICINE

## 2024-11-25 PROCEDURE — 83735 ASSAY OF MAGNESIUM: CPT

## 2024-11-25 PROCEDURE — 85730 THROMBOPLASTIN TIME PARTIAL: CPT | Performed by: INTERNAL MEDICINE

## 2024-11-25 PROCEDURE — 93306 TTE W/DOPPLER COMPLETE: CPT

## 2024-11-25 PROCEDURE — 80048 BASIC METABOLIC PNL TOTAL CA: CPT

## 2024-11-25 PROCEDURE — 85007 BL SMEAR W/DIFF WBC COUNT: CPT

## 2024-11-25 PROCEDURE — 84145 PROCALCITONIN (PCT): CPT

## 2024-11-25 PROCEDURE — 96365 THER/PROPH/DIAG IV INF INIT: CPT

## 2024-11-25 PROCEDURE — 85730 THROMBOPLASTIN TIME PARTIAL: CPT | Performed by: NURSE PRACTITIONER

## 2024-11-25 PROCEDURE — 96375 TX/PRO/DX INJ NEW DRUG ADDON: CPT

## 2024-11-25 RX ORDER — SENNOSIDES 8.6 MG
1 TABLET ORAL
Status: DISCONTINUED | OUTPATIENT
Start: 2024-11-25 | End: 2024-12-03

## 2024-11-25 RX ORDER — ACETAMINOPHEN 325 MG/1
650 TABLET ORAL EVERY 6 HOURS SCHEDULED
Status: DISPENSED | OUTPATIENT
Start: 2024-11-25 | End: 2024-12-03

## 2024-11-25 RX ORDER — HEPARIN SODIUM 1000 [USP'U]/ML
2000 INJECTION, SOLUTION INTRAVENOUS; SUBCUTANEOUS EVERY 6 HOURS PRN
Status: DISCONTINUED | OUTPATIENT
Start: 2024-11-25 | End: 2024-11-28

## 2024-11-25 RX ORDER — MAGNESIUM HYDROXIDE/ALUMINUM HYDROXICE/SIMETHICONE 120; 1200; 1200 MG/30ML; MG/30ML; MG/30ML
30 SUSPENSION ORAL EVERY 6 HOURS PRN
Status: DISCONTINUED | OUTPATIENT
Start: 2024-11-25 | End: 2024-12-06 | Stop reason: HOSPADM

## 2024-11-25 RX ORDER — GABAPENTIN 100 MG/1
100 CAPSULE ORAL
Status: DISCONTINUED | OUTPATIENT
Start: 2024-11-25 | End: 2024-12-06 | Stop reason: HOSPADM

## 2024-11-25 RX ORDER — LACTOBACILLUS ACIDOPHILUS / LACTOBACILLUS BULGARICUS 100 MILLION CFU STRENGTH
1 GRANULES ORAL DAILY
Status: DISCONTINUED | OUTPATIENT
Start: 2024-11-25 | End: 2024-12-06 | Stop reason: HOSPADM

## 2024-11-25 RX ORDER — FOLIC ACID 1 MG/1
1 TABLET ORAL DAILY
Status: DISCONTINUED | OUTPATIENT
Start: 2024-11-25 | End: 2024-12-06 | Stop reason: HOSPADM

## 2024-11-25 RX ORDER — VANCOMYCIN HYDROCHLORIDE 500 MG/100ML
500 INJECTION, SOLUTION INTRAVENOUS EVERY 12 HOURS
Status: DISCONTINUED | OUTPATIENT
Start: 2024-11-25 | End: 2024-11-25

## 2024-11-25 RX ORDER — LORAZEPAM 2 MG/ML
1 INJECTION INTRAMUSCULAR EVERY 8 HOURS PRN
Status: DISCONTINUED | OUTPATIENT
Start: 2024-11-25 | End: 2024-12-06 | Stop reason: HOSPADM

## 2024-11-25 RX ORDER — VANCOMYCIN HYDROCHLORIDE 1 G/200ML
1000 INJECTION, SOLUTION INTRAVENOUS EVERY 24 HOURS
Status: DISCONTINUED | OUTPATIENT
Start: 2024-11-25 | End: 2024-11-27

## 2024-11-25 RX ORDER — GABAPENTIN 300 MG/1
300 CAPSULE ORAL
Status: DISCONTINUED | OUTPATIENT
Start: 2024-11-25 | End: 2024-12-06 | Stop reason: HOSPADM

## 2024-11-25 RX ORDER — PANTOPRAZOLE SODIUM 40 MG/1
40 TABLET, DELAYED RELEASE ORAL
Status: DISCONTINUED | OUTPATIENT
Start: 2024-11-25 | End: 2024-12-06 | Stop reason: HOSPADM

## 2024-11-25 RX ORDER — LORAZEPAM 2 MG/ML
2 INJECTION INTRAMUSCULAR EVERY 8 HOURS PRN
Status: DISCONTINUED | OUTPATIENT
Start: 2024-11-25 | End: 2024-12-06 | Stop reason: HOSPADM

## 2024-11-25 RX ORDER — ONDANSETRON 2 MG/ML
4 INJECTION INTRAMUSCULAR; INTRAVENOUS EVERY 6 HOURS PRN
Status: DISCONTINUED | OUTPATIENT
Start: 2024-11-25 | End: 2024-12-06 | Stop reason: HOSPADM

## 2024-11-25 RX ORDER — VANCOMYCIN HYDROCHLORIDE 750 MG/150ML
15 INJECTION, SOLUTION INTRAVENOUS EVERY 12 HOURS
Status: DISCONTINUED | OUTPATIENT
Start: 2024-11-25 | End: 2024-11-25 | Stop reason: DRUGHIGH

## 2024-11-25 RX ORDER — MAGNESIUM SULFATE HEPTAHYDRATE 40 MG/ML
2 INJECTION, SOLUTION INTRAVENOUS ONCE
Status: COMPLETED | OUTPATIENT
Start: 2024-11-25 | End: 2024-11-25

## 2024-11-25 RX ORDER — LEVOTHYROXINE SODIUM 50 UG/1
50 TABLET ORAL
Status: DISCONTINUED | OUTPATIENT
Start: 2024-11-25 | End: 2024-12-06 | Stop reason: HOSPADM

## 2024-11-25 RX ORDER — HEPARIN SODIUM 10000 [USP'U]/100ML
3-30 INJECTION, SOLUTION INTRAVENOUS
Status: DISCONTINUED | OUTPATIENT
Start: 2024-11-25 | End: 2024-11-28

## 2024-11-25 RX ORDER — LEVOFLOXACIN 5 MG/ML
750 INJECTION, SOLUTION INTRAVENOUS
Status: DISCONTINUED | OUTPATIENT
Start: 2024-11-26 | End: 2024-11-25

## 2024-11-25 RX ORDER — LEVOFLOXACIN 5 MG/ML
750 INJECTION, SOLUTION INTRAVENOUS EVERY 24 HOURS
Status: DISCONTINUED | OUTPATIENT
Start: 2024-11-25 | End: 2024-11-25 | Stop reason: DRUGHIGH

## 2024-11-25 RX ORDER — LEVETIRACETAM 500 MG/1
1000 TABLET ORAL EVERY 12 HOURS SCHEDULED
Status: DISCONTINUED | OUTPATIENT
Start: 2024-11-25 | End: 2024-12-06 | Stop reason: HOSPADM

## 2024-11-25 RX ORDER — DEXAMETHASONE 2 MG/1
2 TABLET ORAL DAILY
Status: DISCONTINUED | OUTPATIENT
Start: 2024-11-25 | End: 2024-12-02

## 2024-11-25 RX ORDER — HEPARIN SODIUM 1000 [USP'U]/ML
4000 INJECTION, SOLUTION INTRAVENOUS; SUBCUTANEOUS EVERY 6 HOURS PRN
Status: DISCONTINUED | OUTPATIENT
Start: 2024-11-25 | End: 2024-11-28

## 2024-11-25 RX ORDER — ACETAMINOPHEN 325 MG/1
650 TABLET ORAL EVERY 6 HOURS PRN
Status: DISCONTINUED | OUTPATIENT
Start: 2024-11-25 | End: 2024-11-26

## 2024-11-25 RX ORDER — HEPARIN SODIUM 1000 [USP'U]/ML
4000 INJECTION, SOLUTION INTRAVENOUS; SUBCUTANEOUS ONCE
Status: COMPLETED | OUTPATIENT
Start: 2024-11-25 | End: 2024-11-25

## 2024-11-25 RX ADMIN — FOLIC ACID 1 MG: 1 TABLET ORAL at 08:32

## 2024-11-25 RX ADMIN — ACETAMINOPHEN 650 MG: 325 TABLET, FILM COATED ORAL at 17:32

## 2024-11-25 RX ADMIN — ACETAMINOPHEN 650 MG: 325 TABLET, FILM COATED ORAL at 06:04

## 2024-11-25 RX ADMIN — CYANOCOBALAMIN TAB 500 MCG 1000 MCG: 500 TAB at 08:32

## 2024-11-25 RX ADMIN — SODIUM CHLORIDE 1250 MG: 0.9 INJECTION, SOLUTION INTRAVENOUS at 00:33

## 2024-11-25 RX ADMIN — GABAPENTIN 100 MG: 100 CAPSULE ORAL at 06:06

## 2024-11-25 RX ADMIN — Medication 1 PACKET: at 08:32

## 2024-11-25 RX ADMIN — CEFTRIAXONE 1000 MG: 2 INJECTION, POWDER, FOR SOLUTION INTRAMUSCULAR; INTRAVENOUS at 14:21

## 2024-11-25 RX ADMIN — DEXAMETHASONE 2 MG: 2 TABLET ORAL at 06:05

## 2024-11-25 RX ADMIN — LEVOTHYROXINE SODIUM 50 MCG: 0.05 TABLET ORAL at 06:06

## 2024-11-25 RX ADMIN — HEPARIN SODIUM 2000 UNITS: 1000 INJECTION INTRAVENOUS; SUBCUTANEOUS at 23:12

## 2024-11-25 RX ADMIN — VANCOMYCIN HYDROCHLORIDE 1000 MG: 1 INJECTION, SOLUTION INTRAVENOUS at 13:15

## 2024-11-25 RX ADMIN — ACETAMINOPHEN 650 MG: 325 TABLET, FILM COATED ORAL at 11:54

## 2024-11-25 RX ADMIN — ACETAMINOPHEN 650 MG: 325 TABLET, FILM COATED ORAL at 23:11

## 2024-11-25 RX ADMIN — HEPARIN SODIUM 4000 UNITS: 1000 INJECTION INTRAVENOUS; SUBCUTANEOUS at 01:34

## 2024-11-25 RX ADMIN — LEVETIRACETAM 1000 MG: 250 TABLET, FILM COATED ORAL at 06:04

## 2024-11-25 RX ADMIN — HEPARIN SODIUM 18 UNITS/KG/HR: 10000 INJECTION, SOLUTION INTRAVENOUS at 01:34

## 2024-11-25 RX ADMIN — GABAPENTIN 300 MG: 300 CAPSULE ORAL at 21:26

## 2024-11-25 RX ADMIN — MAGNESIUM SULFATE HEPTAHYDRATE 2 G: 40 INJECTION, SOLUTION INTRAVENOUS at 05:12

## 2024-11-25 RX ADMIN — LEVETIRACETAM 1000 MG: 250 TABLET, FILM COATED ORAL at 20:47

## 2024-11-25 RX ADMIN — PANTOPRAZOLE SODIUM 40 MG: 40 TABLET, DELAYED RELEASE ORAL at 06:06

## 2024-11-25 RX ADMIN — GABAPENTIN 100 MG: 100 CAPSULE ORAL at 11:54

## 2024-11-25 NOTE — ASSESSMENT & PLAN NOTE
Stage III NSCLC, new onset brain mets 7/30/24  Pt is on immunotherapy ketruda and carboplatin last dose 14NOV24   Oncologist Dr. Castro with [facility].    Radiologist Dr. Vuong with [facility]    OP Pembrolizumab + PEMEtrexed + CARBOplatin Q 21 Days Day 1 Cycle 3 due 05DEC24  Last dose 14NOV24    PLAN:  Consider hemo onc consult  Send courtesy message to dr. Silvestre in the morning

## 2024-11-25 NOTE — ASSESSMENT & PLAN NOTE
Currently being treated with dexamethazone and keppra    PLAN  Continue home dexamethasone and keppra  Seizure precautions  Fall precautions  Ativan prn for seizure activity

## 2024-11-25 NOTE — ASSESSMENT & PLAN NOTE
PERC 2  WELLS 2.5  PESI 104 Class III Intermediate risk   [id number]: CT pe study w abdomen pelvis w contrast  PULMONARY ARTERIAL TREE: Embolism straddles between the middle lobe and right lower lobe basal segment arteries, as seen on series 301/112, this is new from the most recent prior.   Additional embolism noted within the left lower lobe basal arteries straddling into the lower lingular segment, as noted on series 301/, also new from prior.  RV diameter at the level of the AV valve = 3. 9 cm  LV diameter at the level of the AV valve = 2.7 cm  Ratio equals 1.4  CLASSIFICATION Acute hemodynamically stable bilateral right sided segmental left basilar associated with SOB most likely provoked from cancer hx while on immunotherapy     PLAN  Heparin DRIP   Montior for HIT if happens stop heparin give GIVE direct thrombin inhibitors like argatroban, lepirutidn, vealiruidn, fandaparinaux   ECHO PERT Priority

## 2024-11-25 NOTE — PROGRESS NOTES
Ayla Nye is a 74 y.o. female who is currently ordered Vancomycin IV with management by the Pharmacy Consult service.  Relevant clinical data and objective / subjective history reviewed.  Vancomycin Assessment:  Indication and Goal AUC/Trough: Pneumonia (goal -600, trough >10)  Clinical Status: stable  Micro:     Renal Function:  SCr: 0.99 mg/dL  CrCl: 39.4 mL/min  Renal replacement: Not on dialysis  Days of Therapy: 1  Current Dose: vancomycin 750 mg q24h  Vancomycin Plan:  New Dosing: vancomycin 500 mg q12h  Estimated AUC: 534 mcg*hr/mL  Estimated Trough: 17 mcg/mL  Next Level: 11/26 0600  Renal Function Monitoring: Daily BMP and UOP  Pharmacy will continue to follow closely for s/sx of nephrotoxicity, infusion reactions and appropriateness of therapy.  BMP and CBC will be ordered per protocol. We will continue to follow the patient’s culture results and clinical progress daily.    Tin Richardson, Pharmacist

## 2024-11-25 NOTE — H&P
H&P - Hospitalist   Name: Ayla Nye 74 y.o. female I MRN: [id number]  Unit/Bed#: ED-41 I Date of Admission: 2024   Date of Service: 2024 I Hospital Day: 0     Assessment & Plan  Malignant neoplasm of upper lobe, right bronchus or lung (HCC)  Stage III NSCLC, new onset brain mets 24  Pt is on immunotherapy ketruda and carboplatin last dose    Oncologist Dr. Castro with [facility].    Radiologist Dr. Vuong with [facility]    OP Pembrolizumab + PEMEtrexed + CARBOplatin Q 21 Days Day 1 Cycle 3 due [id number]  Last dose     PLAN:  Consider hemo onc consult  Send courtesy message to dr. 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)  Currently being treated with dexamethazone and keppra    PLAN  Continue home dexamethasone and keppra  Seizure precautions  Fall precautions  Ativan prn for seizure activity   Anemia    Blood Pressure: 133/68  Recent Labs     24  1930   HGB 8.0*   BL 8-11    Plan:  Continue to monitor closely while pt on heparin drip  Transfuse if Hb < 7      Acute pulmonary embolism (HCC)  PERC 2  WELLS 2.5  PESI 104 Class III Intermediate risk   Imagin: CT pe study w abdomen pelvis w contrast  PULMONARY ARTERIAL TREE: Embolism straddles between the middle lobe and right lower lobe basal segment arteries, as seen on series 301/112, this is new from the most recent prior.   Additional embolism noted within the left lower lobe basal arteries straddling into the lower lingular segment, as noted on series 301/, also new from prior.  RV diameter at the level of the AV valve = 3. 9 cm  LV diameter at the level of the AV valve = 2.7 cm  Ratio equals 1.4  CLASSIFICATION Acute hemodynamically stable bilateral right sided segmental left basilar associated with SOB most likely provoked from cancer hx while on immunotherapy     PLAN  Heparin DRIP   Montior for HIT if happens stop heparin give GIVE direct thrombin inhibitors like argatroban, lepirutidn,  "jaziel diehldaparinaux   ECHO PERT Priority     Pneumonia    Recent Labs     11/24/24  1930   WBC 2.26*     Recent Labs     11/24/24 1930   CREATININE 0.99   EGFR 56     Estimated Creatinine Clearance: 39.4 mL/min (by C-G formula based on SCr of 0.99 mg/dL).  Recent Labs     11/24/24 2237   LACTICACID 1.4     Recent Labs     11/24/24 2127   PROCALCITONI 0.12     Vitals:    11/25/24 0300   BP: 133/68   Pulse: 86   Resp: 18   Temp:    SpO2: 95%     Temperature: 99.3 °F (37.4 °C)  Temp Source: Oral  Results from last 7 days   Lab Units 11/24/24 2127   LEUKOCYTES UA  Trace*   NITRITE UA  Negative   GLUCOSE UA mg/dl Negative   KETONES UA mg/dl Trace*   BLOOD UA  Small*   WBC UA /hpf 4-10*   RBC UA /hpf 2-4*   BACTERIA UA /hpf None Seen     No results for input(s): \"BLOODCX\", \"SPUTUMCULTUR\", \"GRAMSTAIN\", \"URINECX\", \"WOUNDCULT\", \"BODYFLUIDCUL\", \"MRSACULTURE\", \"INFLUAPCR\", \"INFLUBPCR\", \"RSVPCR\", \"LEGIONELLAUR\", \"STPU\", \"CDIFFTOXINB\" in the last 72 hours.  CT pe study w abdomen pelvis w contrast  Result Date: 11/25/2024  Right upper lobe opacities consistent with pneumonia 4.  Mild pulmonary edema       DEFINITIONS   SIRS: two of the following that cannot be better explained by another cause  HR:>90/min  and WBC: <4x109/L (<4000/mm3), >12x109/L (>12,000/mm3), or =10% bands  SEPSIS  SIRS AND  Confirmed OR Suspected infection Lungs  qSOFA=0    PLAN:  LABS:   Blood cultures Pending  MRSA Nares Pending  Trend CBC and fever as markers of ongoing infection  Trend procal to help guide D/C of abx    REASSESS DAILY: Reassess pt response to txt daily. Document improvement or worsening status.     ABX: Broad Spectrum Abx w/in 1 hr  Levoquin 750mg IV q48hrs (renal dose) Start 23:00 [id number]  Vanc 750mg IV q12hrs Start 23:00 [id number]  Duration 7-10 days     NUTRITION:   Good nutrition and tight glucose management   No current benefit of IV Vit C                    Neutropenia (HCC)      Component      Latest Ref Rng 2/16/2024 " 3/12/2024 4/5/2024 4/30/2024 5/20/2024   Absolute Neutrophils      1.85 - 7.62 Thousand/uL 6.06  5.47  4.95  5.71  5.87      Component      Latest Ref Rng 5/28/2024 6/3/2024 6/10/2024 6/18/2024 6/22/2024   Absolute Neutrophils      1.85 - 7.62 Thousand/uL 5.89  6.31  5.21  3.42  3.63      Component      Latest Ref Rng 6/23/2024 6/24/2024 7/1/2024 7/2/2024 7/3/2024 7/15/2024   Absolute Neutrophils      1.85 - 7.62 Thousand/uL 2.38  1.58 (L)  1.81 (L)  2.08  1.83 (L)  2.08      Component      Latest Ref Rng 7/29/2024 8/21/2024 8/22/2024 8/23/2024 8/24/2024   Absolute Neutrophils      1.85 - 7.62 Thousand/uL 2.95  6.57  5.54  6.22  4.46      Component      Latest Ref Rng 9/9/2024 9/10/2024 9/11/2024 9/12/2024 9/13/2024   Absolute Neutrophils      1.85 - 7.62 Thousand/uL 5.00  3.22  5.80  4.26  7.84 (H)      Component      Latest Ref Rng 10/3/2024 10/16/2024 10/21/2024 10/22/2024 10/27/2024   Absolute Neutrophils      1.85 - 7.62 Thousand/uL 7.17  1.58 (L)  2.79  4.39  9.78 (H)      Component      Latest Ref Rng 11/11/2024 11/24/2024   Absolute Neutrophils      1.85 - 7.62 Thousand/uL 9.40 (H)  1.54 (L)       Recent Labs     11/24/24  1930   AST 10*   ALT 12   ALKPHOS 41   TBILI 0.27       Fever at home 101.3        PLAN:  Broad specturm antibiotics w/ levofloxacin instead of Cefepime due to allergy and vanc: Consider fungal coverage  Peripheral smear pending  Urine cultures pending  Consider Consult ID   Consider Heme/Onc consult  Neutropenic precautions      VTE Pharmacologic Prophylaxis: VTE Score: 9 High Risk (Score >/= 5) - Pharmacological DVT Prophylaxis Ordered: heparin drip. Sequential Compression Devices Ordered.  Code Status: Level 1 - Full Code Full   Discussion with family: Patient declined call to .     Anticipated Length of Stay: Patient will be admitted on an inpatient basis with an anticipated length of stay of greater than 2 midnights secondary to Pulmonary Embolism .    History of Present  Illness   Chief Complaint: SOB fatigue    Ayla Nye is a 74 y.o. female with a PMH of Stage III NSCLC w/ mets to brain on current immunotherapy  who presents with fatigue, SOB, home temp of 101.3  Pt woke up in the morning of 24NOV24 and felt fatigue, then 18:00 same day got chills and took temp was 101.8. She has been told to go to ED anytime she has temp greater than 100.3, due to being on immunotherapy.    Pt endorses 2 nights of night sweats waking up soaking wet.  Pt has SOB since 23NOV24.    Pt had trouble going up steps which normally isnt a problem.   Pt has a cough onset 22NOV24, dry cough.    Pt has clots of blood every time she blows the nose for 1 week.   Pt had covid early November was inpatient and treated.   Endorses off balance intermittently for a few months.   Pt has been having morning headaches earier this week but not last few days.     Pt is on immunotherapy ketruda and carboplatin last dose 14NOV24   Oncologist Dr. Castro with [facility].    Radiologist Dr. Vuong with [facility]    Pt notes chronically gets days mixed up, will open cabinet instead refrigerator.    But no new neuro symptoms.     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      Review of Systems   Constitutional:  Positive for fever. Negative for appetite change and unexpected weight change.   HENT:  Positive for nosebleeds. Negative for congestion and trouble swallowing.    Eyes: Negative.    Respiratory:  Negative for shortness of breath.    Cardiovascular:  Negative for chest pain, palpitations and leg swelling.   Gastrointestinal: Negative.  Negative for constipation and diarrhea.   Genitourinary: Negative.    Neurological:  Positive for dizziness and headaches. Negative for syncope.       Historical Information   Past Medical History:   Diagnosis Date    Allergic 2022    Allergic to neomiacin and cephalexin    Cancer (HCC)     lung cancer    Cancer (HCC)     mets to brain    Cancer (HCC)     perianal mass     Cataract 2023    Due to have durgery 2024    Essential thrombocytosis (HCC)     controlled with medication    Hyperlipidemia     Hypertension     Mass in chest     Nodular goiter     Osteoporosis     Pneumonia Oct 16, 2023    Also  2024    Psoriasis     SIRS (systemic inflammatory response syndrome) (HCC) 2024     Past Surgical History:   Procedure Laterality Date    APPENDECTOMY      BREAST CYST EXCISION Right     COLONOSCOPY  10/31/2018    DXA PROCEDURE (HISTORICAL)  2017    IR BIOPSY OTHER  2024    IR LUMBAR PUNCTURE  2024    MAMMO (HISTORICAL)      18    MAMMO NEEDLE LOCALIZATION RIGHT (ALL INC) Right 2009    SKIN LESION EXCISION      bridge of nose     Social History     Tobacco Use    Smoking status: Former     Current packs/day: 1.00     Average packs/day: 1 pack/day for 58.9 years (58.9 ttl pk-yrs)     Types: Cigarettes     Start date:      Passive exposure: Past    Smokeless tobacco: Never    Tobacco comments:     Quit    Vaping Use    Vaping status: Never Used   Substance and Sexual Activity    Alcohol use: Yes     Alcohol/week: 5.0 standard drinks of alcohol     Types: 5 Glasses of wine per week    Drug use: Never    Sexual activity: Not Currently     Partners: Male     Birth control/protection: Post-menopausal     E-Cigarette/Vaping    E-Cigarette Use Never User      E-Cigarette/Vaping Substances    Nicotine No     THC No     CBD No     Flavoring No     Other No     Unknown No      Family History   Problem Relation Age of Onset    Stroke Mother     Depression Mother             Hypertension Mother     Hearing loss Mother     Tuberculosis Father     Cancer Brother         lung    Tuberculosis Brother     Coronary artery disease Brother     Hypertension Brother     No Known Problems Daughter     No Known Problems Daughter     No Known Problems Maternal Grandmother     No Known Problems Maternal Grandfather     No Known Problems Paternal  Grandmother     No Known Problems Paternal Grandfather     No Known Problems Maternal Aunt     No Known Problems Maternal Aunt     No Known Problems Maternal Aunt     No Known Problems Maternal Aunt     No Known Problems Paternal Aunt     Cancer Brother         Throat canver     Social History:  Marital Status:    Occupation: Retired  Patient Pre-hospital Living Situation: Home  Patient Pre-hospital Level of Mobility: walks  Patient Pre-hospital Diet Restrictions: None    Meds/Allergies   I have reviewed home medications with patient personally.  Prior to Admission medications    Medication Sig Start Date End Date Taking? Authorizing Provider   acetaminophen (TYLENOL) 325 mg tablet Take 2 tablets (650 mg total) by mouth every 6 (six) hours 11/4/24 12/4/24 Yes Jas Edmondson MD   amLODIPine (NORVASC) 5 mg tablet Take 1 tablet (5 mg total) by mouth daily  Patient taking differently: Take 5 mg by mouth daily as needed (For SBP >150) 11/5/24 12/5/24 Yes Jas Edmondson MD   dexamethasone (DECADRON) 2 mg tablet Take 1 tablet (2 mg total) by mouth every 12 (twelve) hours for 4 days, THEN 1 tablet (2 mg total) daily. 11/4/24 12/28/24 Yes Jas Edmondson MD   folic acid (FOLVITE) 1 mg tablet Take 1 tablet (1 mg total) by mouth daily 11/22/24  Yes Rafy Angelo MD   gabapentin (NEURONTIN) 100 mg capsule Take 1 capsule (100 mg total) by mouth 2 (two) times a day before breakfast and lunch 11/22/24 12/22/24 Yes Rafy Angelo MD   gabapentin (NEURONTIN) 100 mg capsule Take 1 capsule PO in the AM, 1 capsule PO in the afternoon, and 3 capsules PO HS. 11/19/24  Yes Ny Carrion PA-C   levETIRAcetam (KEPPRA) 1000 MG tablet Take 1 tablet (1,000 mg total) by mouth every 12 (twelve) hours 11/22/24 12/22/24 Yes Rafy Angelo MD   levothyroxine 50 mcg tablet Take 1 tablet (50 mcg total) by mouth daily in the early morning 11/5/24 12/5/24 Yes Jas Edmondson MD   pantoprazole (PROTONIX) 40 mg tablet Take 1  tablet (40 mg total) by mouth daily in the early morning 11/22/24 12/22/24 Yes Rafy Angelo MD   Probiotic Product (PROBIOTIC DAILY PO) Take 1 capsule by mouth daily ON HOLD   Yes Historical Provider, MD   senna (SENOKOT) 8.6 MG tablet Take 1 tablet by mouth daily Takes one every other day   Yes Historical Provider, MD   vitamin B-12 (VITAMIN B-12) 1,000 mcg tablet Take 1 tablet (1,000 mcg total) by mouth daily 9/14/23  Yes ZULEYMA Boland     Allergies   Allergen Reactions    Doxycycline Headache    Keflex [Cephalexin] Rash    Neomycin-Polymyxin-Dexameth Eye Swelling       Objective :  Temp:  [99.3 °F (37.4 °C)] 99.3 °F (37.4 °C)  HR:  [] 86  BP: (102-167)/(63-92) 133/68  Resp:  [18] 18  SpO2:  [93 %-98 %] 95 %  O2 Device: None (Room air)    Physical Exam  Constitutional:       General: She is not in acute distress.     Appearance: She is not ill-appearing, toxic-appearing or diaphoretic.   HENT:      Nose: Nose normal.      Mouth/Throat:      Mouth: Mucous membranes are moist.      Pharynx: Oropharynx is clear. No oropharyngeal exudate.   Eyes:      General: Visual field deficit present.      Extraocular Movements: Extraocular movements intact.      Comments: Pale conjunctivae   Cardiovascular:      Rate and Rhythm: Normal rate and regular rhythm.      Pulses: Normal pulses.   Pulmonary:      Effort: Pulmonary effort is normal. No respiratory distress.   Abdominal:      General: There is no distension.      Tenderness: There is no abdominal tenderness. There is no guarding or rebound.   Musculoskeletal:      Cervical back: Normal range of motion and neck supple. No rigidity or tenderness.      Right lower leg: No edema.      Left lower leg: No edema.   Lymphadenopathy:      Cervical: No cervical adenopathy.   Skin:     Capillary Refill: Capillary refill takes 2 to 3 seconds.      Coloration: Skin is not jaundiced.   Neurological:      Mental Status: She is alert and oriented to person, place, and  time.      GCS: GCS eye subscore is 4. GCS verbal subscore is 5. GCS motor subscore is 6.      Motor: Motor function is intact. No weakness or tremor.      Coordination: Heel to Shin Test normal.      Comments: Right temporal vision loss   Psychiatric:         Behavior: Behavior normal.          Lines/Drains:            Lab Results: I have reviewed the following results:  Results from last 7 days   Lab Units 11/24/24  1930   WBC Thousand/uL 2.26*   HEMOGLOBIN g/dL 8.0*   HEMATOCRIT % 24.6*   PLATELETS Thousands/uL 116*   BANDS PCT % 2   LYMPHO PCT % 11*   MONO PCT % 14*   EOS PCT % 2     Results from last 7 days   Lab Units 11/24/24  1930   SODIUM mmol/L 139   POTASSIUM mmol/L 3.8   CHLORIDE mmol/L 105   CO2 mmol/L 23   BUN mg/dL 22   CREATININE mg/dL 0.99   ANION GAP mmol/L 11   CALCIUM mg/dL 9.1   ALBUMIN g/dL 3.8   TOTAL BILIRUBIN mg/dL 0.27   ALK PHOS U/L 41   ALT U/L 12   AST U/L 10*   GLUCOSE RANDOM mg/dL 129     Results from last 7 days   Lab Units 11/24/24  1930   INR  0.91         Lab Results   Component Value Date    HGBA1C 5.3 07/30/2024     Results from last 7 days   Lab Units 11/24/24  2237 11/24/24  2127   LACTIC ACID mmol/L 1.4  --    PROCALCITONIN ng/ml  --  0.12       Imaging Results Review: I reviewed radiology reports from this admission including: chest xray, CT chest, and CT head.  Other Study Results Review: EKG was reviewed.     Administrative Statements       ** Please Note: This note has been constructed using a voice recognition system. **  Nutrition Assessment and Intervention:     Ordered nutritional assessment labs      Tobacco and Toxic Substance Assessment and Intervention:     Tobacco use screening performed

## 2024-11-25 NOTE — CONSULTS
Medical Oncology/Hematology Consult Note  Ayla Nye, female, 74 y.o., 1950,  ED-41/ED-41, [id number]     Assessment and Plan  1.  Acute bilateral PE:  Patient is started on heparin gtt., once PTT is therapeutic can transition to Eliquis or Xarelto.    2.  Non-small cell lung cancer:  Patient initially diagnosed as stage IIb adenocarcinoma of lung, completed concurrent chemotherapy with carboplatin and paclitaxel with concurrent radiation.  Now found to have brain metastasis, s/p C2 carboplatin, pemetrexed and pembrolizumab on 11/14/2024.  She had concerns if fever was due to Keytruda, patient was informed that fever and shortness of breath was most likely in setting of pneumonia and PE.  Follow-up with primary oncologist Dr. Penn in outpatient setting.    3.  Brain metastasis:  Patient is currently on Decadron continue same.  She is also on Keppra for seizure prophylaxis.    4.  Pancytopenia:  CBC with WBC 2.16, Hb 7.3, platelet 114, likely chemotherapy induced.  Monitor CBC daily.    Outpatient follow up plan: Follow up with Dr Penn in outpt setting.      Communication with patient/family:  I did update the patient.     Communication with team:  Did communicate with  primary team.      I did review this patient with Dr. Kelley and he is in agreement with this plan.        Reason for consultation: Bilateral PE.    History of present illness:  Ayla Nye is a 74 y.o. female with history of JAK2 positive essential thrombocytosis yesterday treated with hydroxyurea, at the , diagnosed, was locally advanced, unresectable as stage IIIb(T2b, N3, M0) adenocarcinoma of lung, Next-generation sequencing was significant, as follows: PD-L1 TPS: 5%, high Tumor Mutational [place], and pathogenic variant in KRAS G12V, and TP53, s/p carboplatin, paclitaxel and radiation therapy from 5/23/2024 to 7/5/2024.  She was found to have brain mets and is status post cycle 2 carboplatin, pemetrexed and  pembrolizumab on 11/14/2024.  She was also found to have pelvic mass identified on PET scan, pathology demonstrated squamous cell carcinoma, suggesting that above-mentioned was possibly representative of primary anal/perianal squamous cell versus metastatic combined lung tumor.  Status post radiation therapy.  Patient presents to hospital for cough and shortness of breath, CTA remarkable for bilateral pulmonary emboli, RV/LV ratio 1.4.  Stable right upper lobe pulmonary mass, decrease in size of mass in right issue rectal.  Oncology is consulted to further evaluate and management for PE.    Review of Systems:    Review of Systems   Constitutional:  Positive for appetite change and fever.   Respiratory:  Positive for cough and shortness of breath.    Gastrointestinal:  Negative for anal bleeding, blood in stool and constipation.   All other systems reviewed and are negative.      Oncology History:   Cancer Staging   Malignant neoplasm of upper lobe, right bronchus or lung (HCC)  Staging form: Lung, AJCC 8th Edition  - Clinical stage from 4/15/2024: Stage IIIB (cT2b, cN3, cM0) - Signed by Rogelio Beebe DO on 4/15/2024  - Clinical: Stage IV (cM1) - Signed by Honey Gamboa MD on 9/23/2024    Oncology History Overview Note   Patient has history of [id number] (IIIB) NSCLC of the right upper lobe, s/p concurrent chemoRT completing on 7/5/24. She completed a course of SRS on 8/8/24 after MRI brain demonstrated five supra- and infratentorial enhancing lesions compatible with metastases. Currently receiving a course of palliative radiation therapy to right pelvic mass. She returns today for 2 month follow-up with repeat MRI.    10/8/24 MRI Brain BT w wo Contrast  IMPRESSION:  Mixed treatment response since MRI brain 9/10/2024  - Slightly increase size of left temporal lobe metastatic lesion.  - Unchanged left occipital lobe metastatic lesion with evidence of radiation necrosis, left paramedian parietal lobe lesion,  and tiny left anterolateral lobe frontal lobe lesion.  - Unchanged enhancement in bilateral cranial nerve VII and both internal auditory canals, suspicious for leptomeningeal metastasis.  - Decreased size of left paramedian frontal lobe lesion and tiny cerebellar vermis lesion.  - No new enhancing intracranial metastasis.  Persistent diffuse perilesional vasogenic edema in left parietal lobe and left occipital lobe with mild mass effect on posterior aspect of left lateral ventricle.    Upcoming:       Malignant neoplasm of upper lobe, right bronchus or lung (HCC)   2024 Initial Diagnosis    Primary lung adenocarcinoma, right (HCC)     3/26/2024 Biopsy    A-C. Lymph Node, Level 4R :    - Metastatic non-small cell carcinoma, most compatible with a primary lung adenocarcinoma; see note.       D-F. Lymph Node, Level 10R:    - Metastatic non-small cell carcinoma; see note.       G-I. Lymph Node, Level 4L:    - Metastatic non-small cell carcinoma; see note.       4/15/2024 -  Cancer Staged    Staging form: Lung, AJCC 8th Edition  - Clinical stage from 4/15/2024: Stage IIIB (cT2b, cN3, cM0) - Signed by Rogelio Beebe DO on 4/15/2024       5/23/2024 - 7/2/2024 Chemotherapy    alteplase (CATHFLO), 2 mg, Intracatheter, Every 1 Minute as needed, 6 of 6 cycles  CARBOplatin (PARAPLATIN) IVPB (GOG AUC DOSING), 130.4 mg, Intravenous, Once, 6 of 6 cycles  Administration: 130.4 mg (5/23/2024), 144.8 mg (5/30/2024), 138.4 mg (6/6/2024), 144.8 mg (6/13/2024), 132 mg (6/21/2024), 143.6 mg (7/2/2024)  PACLItaxel (TAXOL) chemo IVPB, 45 mg/m2 = 67.2 mg (90 % of original dose 50 mg/m2), Intravenous, Once, 6 of 6 cycles  Dose modification: 45 mg/m2 (original dose 50 mg/m2, Cycle 1, Reason: Anticipated Tolerance)  Administration: 67.2 mg (5/23/2024), 67.2 mg (5/30/2024), 67.2 mg (6/6/2024), 67.2 mg (6/13/2024), 67.2 mg (6/21/2024), 67.2 mg (7/2/2024)     5/23/2024 - 7/5/2024 Radiation    Treatment:  Course: C1    Plan ID Energy  Fractions Dose per Fraction (cGy) Dose Correction (cGy) Total Dose Delivered (cGy) Elapsed Days   R Lung_Hilum 6X 30 / 30 200 0 6,000 43      Treatment dates:  C1: 5/23/2024 - 7/5/2024 8/8/2024 - 8/8/2024 Radiation    SRS 5 PTVs   2000 cGy     9/12/2024 Biopsy    Mass, Superficial perianal mass:  - Squamous cell carcinoma.     Note: The patient's prior lung EBUS sampling shows the tumor to stain for TTF1 and Napsin with absent p40 expression.  The current perianal mass sampling shows diffuse p40 expression with absent TTF1 expression.  This may represent a primary anal/perianal squamous cell carcinoma versus a possible metastatic combined lung tumor (adenocarcinoma and previously unsampled squamous component).  Suggest clinical correlation and appropriate follow-up.         9/23/2024 -  Cancer Staged    Staging form: Lung, AJCC 8th Edition  - Clinical: Stage IV (cM1) - Signed by Honey Gamboa MD on 9/23/2024       10/7/2024 -  Chemotherapy    cyanocobalamin, 1,000 mcg, Intramuscular, Once, 1 of 3 cycles  Administration: 1,000 mcg (8/19/2024)  alteplase (CATHFLO), 2 mg, Intracatheter, Every 1 Minute as needed, 2 of 6 cycles  fosaprepitant (EMEND) IVPB, 150 mg, Intravenous, Once, 2 of 6 cycles  Administration: 150 mg (10/7/2024), 150 mg (11/14/2024)  CARBOplatin (PARAPLATIN) IVPB (GOG AUC DOSING), 347 mg, Intravenous, Once, 2 of 6 cycles  Administration: 347 mg (10/7/2024), 346 mg (11/14/2024)  pemetrexed (ALIMTA) chemo infusion, 735 mg, Intravenous, Once, 2 of 6 cycles  Administration: 700 mg (10/7/2024), 700 mg (11/14/2024)  pembrolizumab (KEYTRUDA) IVPB, 200 mg, Intravenous, Once, 2 of 6 cycles  Administration: 200 mg (11/14/2024), 200 mg (10/7/2024)     Metastatic cancer to brain (HCC)   7/29/2024 Initial Diagnosis    Metastatic cancer to brain (HCC)     8/8/2024 - 8/8/2024 Radiation    SRS 5 PTVs   2000 cGy     9/12/2024 Biopsy    Mass, Superficial perianal mass:  - Squamous cell carcinoma.     Note: The  patient's prior lung EBUS sampling shows the tumor to stain for TTF1 and Napsin with absent p40 expression.  The current perianal mass sampling shows diffuse p40 expression with absent TTF1 expression.  This may represent a primary anal/perianal squamous cell carcinoma versus a possible metastatic combined lung tumor (adenocarcinoma and previously unsampled squamous component).  Suggest clinical correlation and appropriate follow-up.             Past Medical History:   Past Medical History:   Diagnosis Date    Allergic     Allergic to neomiacin and cephalexin    Cancer (HCC)     lung cancer    Cancer (HCC)     mets to brain    Cancer (HCC)     perianal mass    Cataract 2023    Due to have durgery 2024    Essential thrombocytosis (HCC)     controlled with medication    Hyperlipidemia     Hypertension     Mass in chest     Nodular goiter     Osteoporosis     Pneumonia Oct 16, 2023    Also  2024    Psoriasis     SIRS (systemic inflammatory response syndrome) (HCC) 2024       Past Surgical History:   Procedure Laterality Date    APPENDECTOMY      BREAST CYST EXCISION Right     COLONOSCOPY  10/31/2018    DXA PROCEDURE (HISTORICAL)  2017    IR BIOPSY OTHER  2024    IR LUMBAR PUNCTURE  2024    MAMMO (HISTORICAL)      8--    MAMMO NEEDLE LOCALIZATION RIGHT (ALL INC) Right 2009    SKIN LESION EXCISION      bridge of nose       Family History   Problem Relation Age of Onset    Stroke Mother     Depression Mother             Hypertension Mother     Hearing loss Mother     Tuberculosis Father     Cancer Brother         lung    Tuberculosis Brother     Coronary artery disease Brother     Hypertension Brother     No Known Problems Daughter     No Known Problems Daughter     No Known Problems Maternal Grandmother     No Known Problems Maternal Grandfather     No Known Problems Paternal Grandmother     No Known Problems Paternal Grandfather     No Known Problems Maternal  Aunt     No Known Problems Maternal Aunt     No Known Problems Maternal Aunt     No Known Problems Maternal Aunt     No Known Problems Paternal Aunt     Cancer Brother         Throat canver       Social History     Socioeconomic History    Marital status:      Spouse name: None    Number of children: None    Years of education: None    Highest education level: None   Occupational History    None   Tobacco Use    Smoking status: Former     Current packs/day: 1.00     Average packs/day: 1 pack/day for 58.9 years (58.9 ttl pk-yrs)     Types: Cigarettes     Start date: 1966     Passive exposure: Past    Smokeless tobacco: Never    Tobacco comments:     Quit 2010   Vaping Use    Vaping status: Never Used   Substance and Sexual Activity    Alcohol use: Yes     Alcohol/week: 5.0 standard drinks of alcohol     Types: 5 Glasses of wine per week    Drug use: Never    Sexual activity: Not Currently     Partners: Male     Birth control/protection: Post-menopausal   Other Topics Concern    None   Social History Narrative    None     Social Drivers of Health     Financial Resource Strain: Low Risk  (8/14/2023)    Overall Financial Resource Strain (CARDIA)     Difficulty of Paying Living Expenses: Not hard at all   Food Insecurity: No Food Insecurity (10/21/2024)    Nursing - Inadequate Food Risk Classification     Worried About Running Out of Food in the Last Year: Never true     Ran Out of Food in the Last Year: Never true     Ran Out of Food in the Last Year: Not on file   Transportation Needs: No Transportation Needs (10/21/2024)    PRAPARE - Transportation     Lack of Transportation (Medical): No     Lack of Transportation (Non-Medical): No   Physical Activity: Not on file   Stress: Not on file   Social Connections: Not on file   Intimate Partner Violence: Not on file   Housing Stability: Low Risk  (10/21/2024)    Housing Stability Vital Sign     Unable to Pay for Housing in the Last Year: No     Number of Times Moved  in the Last Year: 0     Homeless in the Last Year: No         Current Facility-Administered Medications:     acetaminophen (TYLENOL) tablet 650 mg, 650 mg, Oral, Q6H KRISS, Yuan Stephenson MD, 650 mg at 11/25/24 1154    acetaminophen (TYLENOL) tablet 650 mg, 650 mg, Oral, Q6H PRN, Yuan Stephenson MD    aluminum-magnesium hydroxide-simethicone (MAALOX) oral suspension 30 mL, 30 mL, Oral, Q6H PRN, Yuan Stephenson MD    ceftriaxone (ROCEPHIN) 1 g/50 mL in dextrose IVPB, 1,000 mg, Intravenous, Q24H, Wilfredo Knight DO    cyanocobalamin (VITAMIN B-12) tablet 1,000 mcg, 1,000 mcg, Oral, Daily, Yuan Stephenson MD, 1,000 mcg at 11/25/24 0832    dexamethasone (DECADRON) tablet 2 mg, 2 mg, Oral, Daily, Yuan Stephenson MD, 2 mg at 11/25/24 0605    folic acid (FOLVITE) tablet 1 mg, 1 mg, Oral, Daily, Yuan Stephenson MD, 1 mg at 11/25/24 0832    gabapentin (NEURONTIN) capsule 100 mg, 100 mg, Oral, BID before breakfast/lunch, Yuan Stephenson MD, 100 mg at 11/25/24 1154    gabapentin (NEURONTIN) capsule 300 mg, 300 mg, Oral, HS, Yuan Stephenson MD    heparin (porcine) 25,000 units in 0.45% NaCl 250 mL infusion (premix), 3-30 Units/kg/hr (Order-Specific), Intravenous, Titrated, Yuan Stehpenson MD, Last Rate: 8 mL/hr at 11/25/24 1015, 16 Units/kg/hr at 11/25/24 1015    heparin (porcine) injection 2,000 Units, 2,000 Units, Intravenous, Q6H PRN, Yuan Stephenson MD    heparin (porcine) injection 4,000 Units, 4,000 Units, Intravenous, Q6H PRN, Yuan Stephenson MD    lactobacillus acidophilus-bulgaricus (FLORANEX) packet 1 packet, 1 packet, Oral, Daily, Yuan Stephenson MD, 1 packet at 11/25/24 0832    levETIRAcetam (KEPPRA) tablet 1,000 mg, 1,000 mg, Oral, Q12H KRISS, Yuan Stephenson MD, 1,000 mg at 11/25/24 0604    levothyroxine tablet 50 mcg, 50 mcg, Oral, Early Morning, Yuan Stephenson MD, 50 mcg at 11/25/24 0606     LORazepam (ATIVAN) injection 1 mg, 1 mg, Intramuscular, Q8H PRN, Yuan Stephenson MD    LORazepam (ATIVAN) injection 2 mg, 2 mg, Intravenous, Q8H PRN, Yuan Stephenson MD    ondansetron (ZOFRAN) injection 4 mg, 4 mg, Intravenous, Q6H PRN, Yuan Stephenson MD    pantoprazole (PROTONIX) EC tablet 40 mg, 40 mg, Oral, Early Morning, Yuan Stephenson MD, 40 mg at 11/25/24 0606    senna (SENOKOT) tablet 8.6 mg, 1 tablet, Oral, HS PRN, Yuan Stephenson MD    vancomycin (VANCOCIN) IVPB (premix in dextrose) 1,000 mg 200 mL, 1,000 mg, Intravenous, Q24H, Wilfredo Knight DO, Last Rate: 200 mL/hr at 11/25/24 1315, 1,000 mg at 11/25/24 1315    Current Outpatient Medications:     acetaminophen (TYLENOL) 325 mg tablet, Take 2 tablets (650 mg total) by mouth every 6 (six) hours, Disp: 30 tablet, Rfl: 0    amLODIPine (NORVASC) 5 mg tablet, Take 1 tablet (5 mg total) by mouth daily (Patient taking differently: Take 5 mg by mouth daily as needed (For SBP >150)), Disp: 30 tablet, Rfl: 0    dexamethasone (DECADRON) 2 mg tablet, Take 1 tablet (2 mg total) by mouth every 12 (twelve) hours for 4 days, THEN 1 tablet (2 mg total) daily., Disp: 58 tablet, Rfl: 0    folic acid (FOLVITE) 1 mg tablet, Take 1 tablet (1 mg total) by mouth daily, Disp: 30 tablet, Rfl: 6    gabapentin (NEURONTIN) 100 mg capsule, Take 1 capsule (100 mg total) by mouth 2 (two) times a day before breakfast and lunch, Disp: 60 capsule, Rfl: 0    gabapentin (NEURONTIN) 100 mg capsule, Take 1 capsule PO in the AM, 1 capsule PO in the afternoon, and 3 capsules PO HS., Disp: 150 capsule, Rfl: 0    levETIRAcetam (KEPPRA) 1000 MG tablet, Take 1 tablet (1,000 mg total) by mouth every 12 (twelve) hours, Disp: 60 tablet, Rfl: 0    levothyroxine 50 mcg tablet, Take 1 tablet (50 mcg total) by mouth daily in the early morning, Disp: 30 tablet, Rfl: 0    pantoprazole (PROTONIX) 40 mg tablet, Take 1 tablet (40 mg total) by mouth daily in the early  "morning, Disp: 30 tablet, Rfl: 0    Probiotic Product (PROBIOTIC DAILY PO), Take 1 capsule by mouth daily ON HOLD, Disp: , Rfl:     senna (SENOKOT) 8.6 MG tablet, Take 1 tablet by mouth daily Takes one every other day, Disp: , Rfl:     vitamin B-12 (VITAMIN B-12) 1,000 mcg tablet, Take 1 tablet (1,000 mcg total) by mouth daily, Disp: 90 tablet, Rfl: 1    Facility-Administered Medications Ordered in Other Encounters:     fentaNYL injection, , Intravenous, PRN, Greg Gamble CRNA    midazolam (VERSED) injection, , Intravenous, PRN, Greg Gamble CRNA    Not in a hospital admission.    Allergies   Allergen Reactions    Doxycycline Headache    Keflex [Cephalexin] Rash    Neomycin-Polymyxin-Dexameth Eye Swelling         Physical Exam:     /67   Pulse 72   Temp 98 °F (36.7 °C) (Oral)   Resp 18   Ht 5' 2\" (1.575 m)   Wt 51.7 kg (114 lb)   SpO2 94%   BMI 20.85 kg/m²     Physical Exam  Constitutional:       Appearance: Normal appearance.   HENT:      Head: Normocephalic and atraumatic.      Mouth/Throat:      Mouth: Mucous membranes are dry.      Pharynx: Oropharynx is clear.   Cardiovascular:      Rate and Rhythm: Normal rate and regular rhythm.      Pulses: Normal pulses.      Heart sounds: Normal heart sounds.   Abdominal:      General: Bowel sounds are normal.      Palpations: Abdomen is soft.   Musculoskeletal:      Cervical back: Normal range of motion and neck supple.   Skin:     General: Skin is warm and dry.   Neurological:      Mental Status: She is alert and oriented to person, place, and time. Mental status is at baseline.           Recent Results (from the past 48 hours)   CBC and differential    Collection Time: 11/24/24  7:30 PM   Result Value Ref Range    WBC 2.26 (L) 4.31 - 10.16 Thousand/uL    RBC 2.46 (L) 3.81 - 5.12 Million/uL    Hemoglobin 8.0 (L) 11.5 - 15.4 g/dL    Hematocrit 24.6 (L) 34.8 - 46.1 %     (H) 82 - 98 fL    MCH 32.5 26.8 - 34.3 pg    MCHC 32.5 31.4 - 37.4 g/dL " "   RDW 15.1 11.6 - 15.1 %    MPV 9.5 8.9 - 12.7 fL    Platelets 116 (L) 149 - 390 Thousands/uL   Comprehensive metabolic panel    Collection Time: 11/24/24  7:30 PM   Result Value Ref Range    Sodium 139 135 - 147 mmol/L    Potassium 3.8 3.5 - 5.3 mmol/L    Chloride 105 96 - 108 mmol/L    CO2 23 21 - 32 mmol/L    ANION GAP 11 4 - 13 mmol/L    BUN 22 5 - 25 mg/dL    Creatinine 0.99 0.60 - 1.30 mg/dL    Glucose 129 65 - 140 mg/dL    Calcium 9.1 8.4 - 10.2 mg/dL    AST 10 (L) 13 - 39 U/L    ALT 12 7 - 52 U/L    Alkaline Phosphatase 41 34 - 104 U/L    Total Protein 6.7 6.4 - 8.4 g/dL    Albumin 3.8 3.5 - 5.0 g/dL    Total Bilirubin 0.27 0.20 - 1.00 mg/dL    eGFR 56 ml/min/1.73sq m   HS Troponin 0hr (reflex protocol)    Collection Time: 11/24/24  7:30 PM   Result Value Ref Range    hs TnI 0hr 5 \"Refer to ACS Flowchart\"- see link ng/L   APTT    Collection Time: 11/24/24  7:30 PM   Result Value Ref Range    PTT 29 23 - 34 seconds   Protime-INR    Collection Time: 11/24/24  7:30 PM   Result Value Ref Range    Protime 13.0 12.3 - 15.0 seconds    INR 0.91 0.85 - 1.19   Magnesium    Collection Time: 11/24/24  7:30 PM   Result Value Ref Range    Magnesium 1.7 (L) 1.9 - 2.7 mg/dL   Green / Yellow tube on hold    Collection Time: 11/24/24  7:30 PM   Result Value Ref Range    Extra Tube Hold for add-ons.    Green / Black tube on hold    Collection Time: 11/24/24  7:30 PM   Result Value Ref Range    Extra Tube Hold for add-ons.    Manual Differential(PHLEBS Do Not Order)    Collection Time: 11/24/24  7:30 PM   Result Value Ref Range    Segmented % 66 43 - 75 %    Bands % 2 0 - 8 %    Lymphocytes % 11 (L) 14 - 44 %    Monocytes % 14 (H) 4 - 12 %    Eosinophils % 2 0 - 6 %    Basophils % 1 0 - 1 %    Metamyelocytes % 2 (H) 0 - 1 %    Myelocytes % 2 (H) 0 - 1 %    Absolute Neutrophils 1.54 (L) 1.85 - 7.62 Thousand/uL    Absolute Lymphocytes 0.25 (L) 0.60 - 4.47 Thousand/uL    Absolute Monocytes 0.32 0.00 - 1.22 Thousand/uL    Absolute " "Eosinophils 0.05 0.00 - 0.40 Thousand/uL    Absolute Basophils 0.02 0.00 - 0.10 Thousand/uL    Absolute Metamyelocytes 0.05 0.00 - 0.10 Thousand/uL    Absolute Myelocytes 0.05 0.00 - 0.10 Thousand/uL    Total Counted      RBC Morphology Present     Platelet Estimate Borderline (A) Adequate    Anisocytosis Present     Polychromasia Present    TSH, 3rd generation with Free T4 reflex    Collection Time: 11/24/24  7:30 PM   Result Value Ref Range    TSH 3RD GENERATON 1.109 0.450 - 4.500 uIU/mL   B-Type Natriuretic Peptide(BNP)    Collection Time: 11/24/24  7:30 PM   Result Value Ref Range    BNP 47 0 - 100 pg/mL   ECG 12 lead    Collection Time: 11/24/24  7:35 PM   Result Value Ref Range    Ventricular Rate 101 BPM    Atrial Rate 101 BPM    MA Interval 128 ms    QRSD Interval 64 ms    QT Interval 334 ms    QTC Interval 433 ms    P Axis 66 degrees    QRS Axis 59 degrees    T Wave [place] 55 degrees   HS Troponin I 2hr    Collection Time: 11/24/24  9:27 PM   Result Value Ref Range    hs TnI 2hr 5 \"Refer to ACS Flowchart\"- see link ng/L    Delta 2hr hsTnI 0 <20 ng/L   UA w Reflex to Microscopic w Reflex to Culture    Collection Time: 11/24/24  9:27 PM    Specimen: Urine, Clean Catch   Result Value Ref Range    Color, UA Yellow     Clarity, UA Clear     Specific Gravity, UA 1.033 (H) 1.003 - 1.030    pH, UA 5.5 4.5, 5.0, 5.5, 6.0, 6.5, 7.0, 7.5, 8.0    Leukocytes, UA Trace (A) Negative    Nitrite, UA Negative Negative    Protein, UA 50 (1+) (A) Negative mg/dl    Glucose, UA Negative Negative mg/dl    Ketones, UA Trace (A) Negative mg/dl    Urobilinogen, UA <2.0 <2.0 mg/dl mg/dl    Bilirubin, UA Negative Negative    Occult Blood, UA Small (A) Negative   Urine Microscopic    Collection Time: 11/24/24  9:27 PM   Result Value Ref Range    RBC, UA 2-4 (A) None Seen, 1-2 /hpf    WBC, UA 4-10 (A) None Seen, 1-2 /hpf    Epithelial Cells Occasional None Seen, Occasional /hpf    Bacteria, UA None Seen None Seen, Occasional /hpf "   D-Dimer    Collection Time: 11/24/24  9:27 PM   Result Value Ref Range    D-Dimer, Quant 1.57 (H) <0.50 ug/ml FEU   Procalcitonin    Collection Time: 11/24/24  9:27 PM   Result Value Ref Range    Procalcitonin 0.12 <=0.25 ng/ml   Blood culture #1    Collection Time: 11/24/24 10:33 PM    Specimen: Arm, Right; Blood   Result Value Ref Range    Blood Culture Received in Microbiology Lab. Culture in Progress.    Blood culture #2    Collection Time: 11/24/24 10:33 PM    Specimen: Arm, Right; Blood   Result Value Ref Range    Blood Culture Received in Microbiology Lab. Culture in Progress.    FLU/COVID Rapid Antigen (30 min. TAT) - Preferred screening test in ED    Collection Time: 11/24/24 10:33 PM    Specimen: Nose; Nares   Result Value Ref Range    SARS COV Rapid Antigen Negative Negative    Influenza A Rapid Antigen Negative Negative    Influenza B Rapid Antigen Negative Negative   Lactic acid, plasma (w/reflex if result > 2.0)    Collection Time: 11/24/24 10:37 PM   Result Value Ref Range    LACTIC ACID 1.4 0.5 - 2.0 mmol/L   CBC and differential    Collection Time: 11/25/24  5:22 AM   Result Value Ref Range    WBC 2.16 (L) 4.31 - 10.16 Thousand/uL    RBC 2.24 (L) 3.81 - 5.12 Million/uL    Hemoglobin 7.3 (L) 11.5 - 15.4 g/dL    Hematocrit 23.1 (L) 34.8 - 46.1 %     (H) 82 - 98 fL    MCH 32.6 26.8 - 34.3 pg    MCHC 31.6 31.4 - 37.4 g/dL    RDW 15.6 (H) 11.6 - 15.1 %    MPV 10.5 8.9 - 12.7 fL    Platelets 114 (L) 149 - 390 Thousands/uL   Manual Differential(PHLEBS Do Not Order)    Collection Time: 11/25/24  5:22 AM   Result Value Ref Range    Segmented % 75 43 - 75 %    Bands % 2 0 - 8 %    Lymphocytes % 18 14 - 44 %    Monocytes % 3 (L) 4 - 12 %    Eosinophils % 1 0 - 6 %    Basophils % 0 0 - 1 %    Myelocytes % 1 0 - 1 %    Absolute Neutrophils 1.66 (L) 1.85 - 7.62 Thousand/uL    Absolute Lymphocytes 0.39 (L) 0.60 - 4.47 Thousand/uL    Absolute Monocytes 0.06 0.00 - 1.22 Thousand/uL    Absolute Eosinophils  0.02 0.00 - 0.40 Thousand/uL    Absolute Basophils 0.00 0.00 - 0.10 Thousand/uL    Absolute Myelocytes 0.02 0.00 - 0.10 Thousand/uL    Total Counted      RBC Morphology Present     Platelet Estimate Borderline (A) Adequate    Anisocytosis Present     Polychromasia Present    Basic metabolic panel    Collection Time: 11/25/24  7:32 AM   Result Value Ref Range    Sodium 136 135 - 147 mmol/L    Potassium 3.5 3.5 - 5.3 mmol/L    Chloride 103 96 - 108 mmol/L    CO2 24 21 - 32 mmol/L    ANION GAP 9 4 - 13 mmol/L    BUN 16 5 - 25 mg/dL    Creatinine 0.81 0.60 - 1.30 mg/dL    Glucose 82 65 - 140 mg/dL    Calcium 8.9 8.4 - 10.2 mg/dL    eGFR 71 ml/min/1.73sq m   Magnesium    Collection Time: 11/25/24  7:32 AM   Result Value Ref Range    Magnesium 2.5 1.9 - 2.7 mg/dL   Procalcitonin    Collection Time: 11/25/24  7:32 AM   Result Value Ref Range    Procalcitonin 0.10 <=0.25 ng/ml   APTT    Collection Time: 11/25/24  7:32 AM   Result Value Ref Range    PTT 94 (H) 23 - 34 seconds   Echo complete w/ contrast if indicated    Collection Time: 11/25/24  8:04 AM   Result Value Ref Range    BSA 1.51 m2    A4C EF 66 %    LVIDd 3.90 cm    LVIDS 2.50 cm    IVSd 0.80 cm    LVPWd 0.90 cm    FS 36 28 - 44    MV E' Tissue Velocity Septal 7 cm/s    LA Volume Index (BP) 29.8 mL/m2    E/A ratio 0.68     E wave deceleration time 211 ms    MV Peak E Jf 65 cm/s    MV Peak A Jf 0.96 m/s    RVID d 2.4 cm    Tricuspid annular plane systolic excursion 1.90 cm    LA size 3.5 cm    LA length (A2C) 5.70 cm    LA volume (BP) 45 mL    RAA A4C 10.6 cm2    MV stenosis pressure 1/2 time 61 ms    MV valve area p 1/2 method 3.61     TR Peak Jf 2.7 m/s    Triscuspid Valve Regurgitation Peak Gradient 28.0 mmHg    Ao root 3.10 cm    Asc Ao 3.3 cm    Tricuspid valve peak regurgitation velocity 2.65 m/s    Left ventricular stroke volume (2D) 45.00 mL    IVS 0.8 cm    LEFT VENTRICLE SYSTOLIC VOLUME (MOD BIPLANE) 2D 22 mL    LV DIASTOLIC VOLUME (MOD BIPLANE) 2D 67  mL    Left Atrium Area-systolic Four Chamber 14.6 cm2    Left Atrium Area-systolic Apical Two Chamber 19 cm2    LVSV, 2D 45 mL    LV EF 65     Est. RA pres 3.0 mmHg    Right Ventricular Peak Systolic Pressure 31.00 mmHg       XR chest 2 views  Result Date: 11/25/2024  Narrative: XR CHEST PA AND LATERAL INDICATION: Cough, shortness of breath. COMPARISON: Chest radiograph 10/16/2024. CT PE study 11/24/2024. FINDINGS: Patchy consolidation within the peripheral right upper lobe. Redemonstration of the right upper lobe paramediastinal mass measuring 2.5 x 1.9 cm. No pneumothorax or pleural effusion. Normal cardiomediastinal silhouette. Bones are unremarkable for age. Normal upper abdomen.     Impression: Right upper lobe pneumonia. Redemonstration of the right upper lobe mass, better evaluated on the CT from 11/24/2024. Resident: Sanaz Corona I, the attending radiologist, have reviewed the images and agree with the final report above. Workstation performed: [id number]     Echo complete w/ contrast if indicated  Result Date: 11/25/2024  Narrative:   Left Ventricle: Left ventricular cavity size is normal. Wall thickness is normal. The left ventricular ejection fraction is 65%. Systolic function is normal. Wall motion is normal. Diastolic function is mildly abnormal, consistent with grade I (abnormal) relaxation.   Mitral Valve: There is mild annular calcification.   Tricuspid Valve: There is mild regurgitation.     CT pe study w abdomen pelvis w contrast  Result Date: 11/25/2024  Narrative: CT PULMONARY ANGIOGRAM OF THE CHEST AND CT ABDOMEN AND PELVIS WITH INTRAVENOUS CONTRAST INDICATION: elevated d-dimer, sob, r/o PE. HX Lung cancer with mets to brain. COMPARISON: CT 10/22/2024, 7/3/2024, PET/CT 3/22/2024 TECHNIQUE: CT examination of the chest, abdomen and pelvis was performed. Thin section CT angiographic technique was used in the chest in order to evaluate for pulmonary embolus and coronal 3D MIP postprocessing was  performed on the acquisition scanner. Multiplanar 2D reformatted images were created from the source data. This examination, like all CT scans performed in the [facility], was performed utilizing techniques to minimize radiation dose exposure, including the use of iterative reconstruction and automated exposure control. Radiation dose length product (DLP) for this visit: 369 mGy-cm IV Contrast: 85 mL of iohexol (OMNIPAQUE) Enteric Contrast: Not administered. FINDINGS: CHEST PULMONARY ARTERIAL TREE: Embolism straddles between the middle lobe and right lower lobe basal segment arteries, as seen on series 301/112, this is new from the most recent prior. Additional embolism noted within the left lower lobe basal arteries straddling into the lower lingular segment, as noted on series 301/98, also new from prior. RV diameter at the level of the AV valve = 3. 9 cm LV diameter at the level of the AV valve = 2.7 cm Ratio equals 1.4 LUNGS: There are new patchy airspace opacities seen throughout the right upper lobe Spiculated right upper lobe nodule measures 2.5 x 1.9 cm, similar There is mild diffuse groundglass opacities throughout, likely representing a mild pulmonary edema, previously noted groundglass nodule is predominantly obscured Mild emphysema PLEURA: Unremarkable. HEART/AORTA: Heart is unremarkable for patient's age. No thoracic aortic aneurysm. MEDIASTINUM AND SUDEEP: Unremarkable. CHEST WALL AND LOWER NECK: Unremarkable. ABDOMEN LIVER/BILIARY TREE: Unremarkable. GALLBLADDER: No calcified gallstones. No pericholecystic inflammatory change. SPLEEN: Unremarkable. PANCREAS: Unremarkable. ADRENAL GLANDS: Unremarkable. KIDNEYS/URETERS: 2 x 1.8 cm hypodense right upper pole lesion, series 306/30, with surrounding hypoenhancing parenchyma, similar to prior 0.6 cm ill-defined area of hypoenhancement within the posterior right lower pole series 306/58, stable from most recent prior 2 mm nonobstructing right  upper pole stone STOMACH AND BOWEL: Unremarkable. APPENDIX: No findings to suggest appendicitis. ABDOMINOPELVIC CAVITY: No ascites. No pneumoperitoneum. No lymphadenopathy. VESSELS: Unremarkable for patient's age. PELVIS REPRODUCTIVE ORGANS: Unremarkable for patient's age. URINARY BLADDER: Unremarkable. ABDOMINAL WALL/INGUINAL REGIONS: 4 x 3.6 cm centrally hypodense collection within the right ischio rectal fossa, previously 6.3 cm BONES: No acute fracture or suspicious osseous lesion.     Impression: 1.  Bilateral pulmonary emboli 2.  The calculated ratio of right ventricular to left ventricular diameter (RV/LV ratio) is 1.4. This is greater than 0.9, which is abnormal and indicates right heart strain. An abnormal RV/LV ratio has been shown to be associated with an increased risk of 30 day mortality in the setting of acute pulmonary embolism. 3.  Right upper lobe opacities consistent with pneumonia 4.  Mild pulmonary edema 5.  Stable right upper lobe pulmonary mass 6.  Stable right renal lesion suspicious for metastasis 7.  Decrease in size of mass in the right ischio rectal fossa I personally discussed impression 1-3 with Fitz Burr at 11/25/24 12:44 AM Workstation performed: [id number]     CT head without contrast  Result Date: 11/24/2024  Narrative: CT BRAIN - WITHOUT CONTRAST INDICATION:   fever tachycardia, hx brain mets. COMPARISON: CT brain dated October 20, 2024. MRI brain dated October 21, 2024. TECHNIQUE:  CT examination of the brain was performed.  Multiplanar 2D reformatted images were created from the source data. Radiation dose length product (DLP) for this visit:  1003 mGy-cm .  This examination, like all CT scans performed in the [facility], was performed utilizing techniques to minimize radiation dose exposure, including the use of iterative reconstruction and automated exposure control. IMAGE QUALITY:  Diagnostic. FINDINGS: PARENCHYMA: Again noted is extensive vasogenic edema  at the left parietal and occipital lobes similar to the prior exam in this patient with a known metastatic lesion in this region, poorly visualized on this unenhanced study. There is similar mass effect on the posterior horn of the left lateral ventricle. No midline shift. There is mild periventricular white matter low attenuation which is nonspecific and most likely related to chronic small vessel ischemic changes. No acute intracranial hemorrhage or ischemia. VENTRICLES AND EXTRA-AXIAL SPACES: See above. No hydrocephalus. VISUALIZED ORBITS: Normal visualized orbits. PARANASAL SINUSES: Normal visualized paranasal sinuses. CALVARIUM AND EXTRACRANIAL SOFT TISSUES: Normal.     Impression: Reidentified vasogenic edema at the left parietal and left occipital lobe similar to the prior exam, attributed to the patient's history of brain metastases, poorly visualized on this unenhanced study. There is similar mass effect on the posterior horn of the left lateral ventricle. No midline shift. No acute intracranial abnormality. Mild chronic small vessel ischemic changes. Workstation performed: [id number]       Labs and pertinent reports reviewed.      This note has been generated by voice recognition software system.  Therefore, there may be spelling, grammar, and or syntax errors. Please contact if questions arise.

## 2024-11-25 NOTE — ASSESSMENT & PLAN NOTE
Component      Latest Ref Rng 2/16/2024 3/12/2024 4/5/2024 4/30/2024 5/20/2024   Absolute Neutrophils      1.85 - 7.62 Thousand/uL 6.06  5.47  4.95  5.71  5.87      Component      Latest Ref Rng 5/28/2024 6/3/2024 6/10/2024 6/18/2024 6/22/2024   Absolute Neutrophils      1.85 - 7.62 Thousand/uL 5.89  6.31  5.21  3.42  3.63      Component      Latest Ref Rng 6/23/2024 6/24/2024 7/1/2024 7/2/2024 7/3/2024 7/15/2024   Absolute Neutrophils      1.85 - 7.62 Thousand/uL 2.38  1.58 (L)  1.81 (L)  2.08  1.83 (L)  2.08      Component      Latest Ref Rng 7/29/2024 8/21/2024 8/22/2024 8/23/2024 8/24/2024   Absolute Neutrophils      1.85 - 7.62 Thousand/uL 2.95  6.57  5.54  6.22  4.46      Component      Latest Ref Rng 9/9/2024 9/10/2024 9/11/2024 9/12/2024 9/13/2024   Absolute Neutrophils      1.85 - 7.62 Thousand/uL 5.00  3.22  5.80  4.26  7.84 (H)      Component      Latest Ref Rng 10/3/2024 10/16/2024 10/21/2024 10/22/2024 10/27/2024   Absolute Neutrophils      1.85 - 7.62 Thousand/uL 7.17  1.58 (L)  2.79  4.39  9.78 (H)      Component      Latest Ref Rng 11/11/2024 11/24/2024   Absolute Neutrophils      1.85 - 7.62 Thousand/uL 9.40 (H)  1.54 (L)       Recent Labs     11/24/24  1930   AST 10*   ALT 12   ALKPHOS 41   TBILI 0.27       Fever at home 101.3        PLAN:  Broad specturm antibiotics w/ levofloxacin instead of Cefepime due to allergy and vanc: Consider fungal coverage  Peripheral smear pending  Urine cultures pending  Consider Consult ID   Consider Heme/Onc consult  Neutropenic precautions

## 2024-11-25 NOTE — ASSESSMENT & PLAN NOTE
Blood Pressure: 133/68  Recent Labs     11/24/24  1930   HGB 8.0*   BL 8-11    Plan:  Continue to monitor closely while pt on heparin drip  Transfuse if Hb < 7

## 2024-11-25 NOTE — ASSESSMENT & PLAN NOTE
"  Recent Labs     11/24/24 1930   WBC 2.26*     Recent Labs     11/24/24 1930   CREATININE 0.99   EGFR 56     Estimated Creatinine Clearance: 39.4 mL/min (by C-G formula based on SCr of 0.99 mg/dL).  Recent Labs     11/24/24 2237   LACTICACID 1.4     Recent Labs     11/24/24 2127   PROCALCITONI 0.12     Vitals:    11/25/24 0300   BP: 133/68   Pulse: 86   Resp: 18   Temp:    SpO2: 95%     Temperature: 99.3 °F (37.4 °C)  Temp Source: Oral  Results from last 7 days   Lab Units 11/24/24 2127   LEUKOCYTES UA  Trace*   NITRITE UA  Negative   GLUCOSE UA mg/dl Negative   KETONES UA mg/dl Trace*   BLOOD UA  Small*   WBC UA /hpf 4-10*   RBC UA /hpf 2-4*   BACTERIA UA /hpf None Seen     No results for input(s): \"BLOODCX\", \"SPUTUMCULTUR\", \"GRAMSTAIN\", \"URINECX\", \"WOUNDCULT\", \"BODYFLUIDCUL\", \"MRSACULTURE\", \"INFLUAPCR\", \"INFLUBPCR\", \"RSVPCR\", \"LEGIONELLAUR\", \"STPU\", \"CDIFFTOXINB\" in the last 72 hours.  CT pe study w abdomen pelvis w contrast  Result Date: 11/25/2024  Right upper lobe opacities consistent with pneumonia 4.  Mild pulmonary edema       DEFINITIONS   SIRS: two of the following that cannot be better explained by another cause  HR:>90/min  and WBC: <4x109/L (<4000/mm3), >12x109/L (>12,000/mm3), or =10% bands  SEPSIS  SIRS AND  Confirmed OR Suspected infection Lungs  qSOFA=0    PLAN:  LABS:   Blood cultures Pending  MRSA Nares Pending  Trend CBC and fever as markers of ongoing infection  Trend procal to help guide D/C of abx    REASSESS DAILY: Reassess pt response to txt daily. Document improvement or worsening status.     ABX: Broad Spectrum Abx w/in 1 hr  Levoquin 750mg IV q48hrs (renal dose) Start 23:00 [id number]  Vanc 750mg IV q12hrs Start 23:00 [id number]  Duration 7-10 days     NUTRITION:   Good nutrition and tight glucose management   No current benefit of IV Vit C                    "

## 2024-11-25 NOTE — ED ATTENDING ATTESTATION
11/24/2024  I, Carlos Burroughs MD, saw and evaluated the patient. I have discussed the patient with the resident/non-physician practitioner and agree with the resident's/non-physician practitioner's findings, Plan of Care, and MDM as documented in the resident's/non-physician practitioner's note, except where noted. All available labs and Radiology studies were reviewed.  I was present for key portions of any procedure(s) performed by the resident/non-physician practitioner and I was immediately available to provide assistance.       At this point I agree with the current assessment done in the Emergency Department.  I have conducted an independent evaluation of this patient a history and physical is as follows: Patient is a 74 year old female with fever today and worsening dizziness over past few days. Has had dizziness for months. (+) cough. No N/V/D. No urinary sx. No travel. Is on immunotherapy for lung cancer with brain mets. States she had covid 2 weeks ago. [facility] seen [facility] on 11/13/24 for cancer associated pain. [employer] website checked on this patient and last Rx filled was on 10/10/24 for dilaudid for 15 day supply. NCAT. No scleral icterus. Mucous membranes somewhat moist. ENT exam as per ED resident. Lung sounds diminished bilaterally. Heart regular without murmur. Abdomen soft and nontender. Good bowel sounds. No edema. No rash noted. No focal deficits. DDx including but not limited to: viral illness, pneumonia, URI, OM, pharyngitis, influenza, COVID-19 (novel coronavirus), influenza, neutropenia, cellulitis, UTI; doubt meningitis, meningococcemia; sinusitis. DDx including but not limited to: metabolic abnormality, cardiac abnormality, thyroid disease, intracranial process, adverse reaction. Will check labs, EKG, CXR, and CTs. Will need admission.     ED Course         Critical Care Time  Procedures

## 2024-11-25 NOTE — ED PROVIDER NOTES
Time reflects when diagnosis was documented in both MDM as applicable and the Disposition within this note       Time User Action Codes Description Comment    11/25/2024  1:48 AM Carlos Burroughs Add [I26.99] Bilateral pulmonary embolism (HCC)     11/25/2024  1:48 AM Carlos Burroughs Add [J18.9] Pneumonia     11/25/2024  1:48 AM Carlos Burroughs S Add [A41.9] Sepsis (HCC)     11/25/2024  1:48 AM Carlos Burroughs S Add [D70.9] Neutropenia (HCC)           ED Disposition       ED Disposition   Admit    Condition   Stable    Date/Time   Mon Nov 25, 2024  1:59 AM    Comment   Case was discussed with [facility] resident and the patient's admission status was agreed to be Admission Status: inpatient status to the service of Dr. Knight .               Assessment & Plan       Medical Decision Making  74-year-old female currently on immunotherapy for lung cancer with metastatic disease to the brain, presenting to the Emergency Department for fevers, cough, feeling disoriented.    Differential diagnoses include but not limited to: COVID-19, pneumonia, worsening metastatic disease, pulmonary emboli.     Orders written for labs, including D-Dimer,  EKG, chest x-ray, CT head without contrast.   Septic workup initiated including blood cultures, lactic.    Patient initially treated with broad-spectrum antibiotics including IV vancomycin and IV Levaquin as well as IV fluids.     See ED course for full details.    D-dimer noted to be elevated will pursue further imaging with CT PE abdomen pelvis with IV contrast.    CT Head negative for acute findings.   CT PE Abdomen/Pelvis notable for bilateral Pulmonary emboli, as well as RUL pneumonia.     Heparin IV initiated.     Patient PESI score places patient in high risk category. BNP WNL.     Given patient's acute sepsis, pneumonia, bilateral pulmonary emboli, the patient requires additional workup and management at this time.  Case discussed with SKINNY, who agrees with the management,  and will admit the patient at this time.  Patient to be admitted under the care of Dr. Knight, Hospitalist     Amount and/or Complexity of Data Reviewed  Labs: ordered. Decision-making details documented in ED Course.  Radiology: ordered and independent interpretation performed. Decision-making details documented in ED Course.    Risk  Prescription drug management.  Decision regarding hospitalization.        ED Course as of 11/25/24 0248   Sun Nov 24, 2024   2351 Influenza A Rapid Antigen: Negative   2351 Influenza B Rapid Antigen: Negative   2351 SARS COV Rapid Antigen: Negative   2351 Comprehensive metabolic panel(!)  No evidence of electrolyte abnormalities, endorgan damage   2351 LACTIC ACID: 1.4   2352 D-Dimer, Quant(!): 1.57  D-dimer noted to be elevated will pursue further with CT PE   2352 CBC and differential(!)  White blood count noted to be low compared to previous value 3 weeks ago.   Evidence of pancytopenia.    2352 TSH 3RD GENERATON: 1.109   2352 Procalcitonin: 0.12   Mon Nov 25, 2024   0155 BNP: 47   0210 CT head without contrast  IMPRESSION:     Reidentified vasogenic edema at the left parietal and left occipital lobe similar to the prior exam, attributed to the patient's history of brain metastases, poorly visualized on this unenhanced study. There is similar mass effect on the posterior horn   of the left lateral ventricle. No midline shift.     No acute intracranial abnormality.     Mild chronic small vessel ischemic changes.     Workstation performed: [id number]     0210 CT pe study w abdomen pelvis w contrast  IMPRESSION:     1.  Bilateral pulmonary emboli  2.  The calculated ratio of right ventricular to left ventricular diameter (RV/LV ratio) is 1.4. This is greater than 0.9, which is abnormal and indicates right heart strain. An abnormal RV/LV ratio has been shown to be associated with an increased risk   of 30 day mortality in the setting of acute pulmonary embolism.  3.  Right upper lobe  opacities consistent with pneumonia  4.  Mild pulmonary edema  5.  Stable right upper lobe pulmonary mass  6.  Stable right renal lesion suspicious for metastasis  7.  Decrease in size of mass in the right ischio rectal fossa     I personally discussed impression 1-3 with Fitz Burr at 11/25/24 12:44 AM     Workstation performed: [id number]     0210 XR chest 2 views   0211 XR chest 2 views  RUL infiltrate as interpreted by me.        Medications   heparin (porcine) 25,000 units in 0.45% NaCl 250 mL infusion (premix) (18 Units/kg/hr × 50 kg (Order-Specific) Intravenous New Bag 11/25/24 0134)   heparin (porcine) injection 4,000 Units (has no administration in time range)   heparin (porcine) injection 2,000 Units (has no administration in time range)   acetaminophen (TYLENOL) tablet 650 mg (650 mg Oral Not Given 11/25/24 0237)   dexamethasone (DECADRON) tablet 2 mg (has no administration in time range)   folic acid (FOLVITE) tablet 1 mg (has no administration in time range)   gabapentin (NEURONTIN) capsule 100 mg (has no administration in time range)   gabapentin (NEURONTIN) capsule 300 mg (has no administration in time range)   levETIRAcetam (KEPPRA) tablet 1,000 mg (has no administration in time range)   levothyroxine tablet 50 mcg (has no administration in time range)   pantoprazole (PROTONIX) EC tablet 40 mg (has no administration in time range)   lactobacillus acidophilus-bulgaricus (FLORANEX) packet 1 packet (has no administration in time range)   cyanocobalamin (VITAMIN B-12) tablet 1,000 mcg (has no administration in time range)   acetaminophen (TYLENOL) tablet 650 mg (has no administration in time range)   senna (SENOKOT) tablet 8.6 mg (has no administration in time range)   aluminum-magnesium hydroxide-simethicone (MAALOX) oral suspension 30 mL (has no administration in time range)   ondansetron (ZOFRAN) injection 4 mg (has no administration in time range)   sodium chloride 0.9 % bolus 1,000 mL (0 mL  Intravenous Stopped 11/25/24 0033)   levofloxacin (LEVAQUIN) IVPB (premix in dextrose) 750 mg 150 mL (0 mg Intravenous Stopped 11/25/24 0030)   vancomycin (VANCOCIN) 1,250 mg in sodium chloride 0.9 % 250 mL IVPB (1,250 mg Intravenous New Bag 11/25/24 0033)   iohexol (OMNIPAQUE) 350 MG/ML injection (MULTI-DOSE) 85 mL (85 mL Intravenous Given 11/24/24 2350)   heparin (porcine) injection 4,000 Units (4,000 Units Intravenous Given 11/25/24 0134)       ED Risk Strat Scores                      PERC Rule for PE      Flowsheet Row Most Recent Value   PERC Rule for PE    Age >=50 1 Filed at: 11/25/2024 0151   HR >=100 1 Filed at: 11/25/2024 0151   O2 Sat on room air < 95% 0 Filed at: 11/25/2024 0151   History of PE or DVT 0 Filed at: 11/25/2024 0151   Recent trauma or surgery 0 Filed at: 11/25/2024 0151   Hemoptysis 0 Filed at: 11/25/2024 0151   Exogenous estrogen 0 Filed at: 11/25/2024 0151   Unilateral leg swelling 0 Filed at: 11/25/2024 0151   PERC Rule for PE Results 2 Filed at: 11/25/2024 0151            SBIRT 20yo+      Flowsheet Row Most Recent Value   Initial Alcohol Screen: US AUDIT-C     1. How often do you have a drink containing alcohol? 0 Filed at: 11/24/2024 1939   2. How many drinks containing alcohol do you have on a typical day you are drinking?  0 Filed at: 11/24/2024 1939   3a. Male UNDER 65: How often do you have five or more drinks on one occasion? 0 Filed at: 11/24/2024 1939   3b. FEMALE Any Age, or MALE 65+: How often do you have 4 or more drinks on one occassion? 0 Filed at: 11/24/2024 1939   Audit-C Score 0 Filed at: 11/24/2024 1939   ALEXIS: How many times in the past year have you...    Used an illegal drug or used a prescription medication for non-medical reasons? Never Filed at: 11/24/2024 1939            Wells' Criteria for PE      Flowsheet Row Most Recent Value   Wells' Criteria for PE    Clinical signs and symptoms of DVT 0 Filed at: 11/25/2024 0151   PE is primary diagnosis or equally likely  0 Filed at: 2024   HR >100 1.5 Filed at: 2024   Immobilization at least 3 days or Surgery in the previous 4 weeks 0 Filed at: 2024   Previous, objectively diagnosed PE or DVT 0 Filed at: 2024   Hemoptysis 0 Filed at: 2024   Malignancy with treatment within 6 months or palliative 1 Filed at: 2024   Wells' Criteria Total 2.5 Filed at: 2024              PESI  Age: 74 years old  Sex: 0  History of Cancer: 30  History of Heart Failure: 0  History of Chronic Lung Disease: 0  Heart rate greater than or equal to 110: 0  Systolic BP < 100 mmH  Respiratory rate greater than or equal to 30: 0  Temperature <36°C/96.8°F: 0  Altered Mental Status (Disorientation, lethargy, stupor, or coma): 0  O2 saturation <90%: 0  PESI Score Results: 104             History of Present Illness       Chief Complaint   Patient presents with    Dizziness     Arrives from home for complaints of dizziness ongoing for the past week, but increasing over the past few days. (+) fever (TMAX 100.9) - currently on immunotherapy for Lung CA with METS to brain.        Past Medical History:   Diagnosis Date    Allergic     Allergic to neomiacin and cephalexin    Cancer (HCC)     lung cancer    Cancer (HCC)     mets to brain    Cancer (HCC)     perianal mass    Cataract 2023    Due to have durgery 2024    Essential thrombocytosis (HCC)     controlled with medication    Hyperlipidemia     Hypertension     Mass in chest     Nodular goiter     Osteoporosis     Pneumonia Oct 16, 2023    Also  2024    Psoriasis     SIRS (systemic inflammatory response syndrome) (HCC) 2024      Past Surgical History:   Procedure Laterality Date    APPENDECTOMY      BREAST CYST EXCISION Right 2009    COLONOSCOPY  10/31/2018    DXA PROCEDURE (HISTORICAL)  2017    IR BIOPSY OTHER  2024    IR LUMBAR PUNCTURE  2024    MAMMO (HISTORICAL)      18    MAMMO NEEDLE  LOCALIZATION RIGHT (ALL INC) Right 2009    SKIN LESION EXCISION      bridge of nose      Family History   Problem Relation Age of Onset    Stroke Mother     Depression Mother             Hypertension Mother     Hearing loss Mother     Tuberculosis Father     Cancer Brother         lung    Tuberculosis Brother     Coronary artery disease Brother     Hypertension Brother     No Known Problems Daughter     No Known Problems Daughter     No Known Problems Maternal Grandmother     No Known Problems Maternal Grandfather     No Known Problems Paternal Grandmother     No Known Problems Paternal Grandfather     No Known Problems Maternal Aunt     No Known Problems Maternal Aunt     No Known Problems Maternal Aunt     No Known Problems Maternal Aunt     No Known Problems Paternal Aunt     Cancer Brother         Throat canver      Social History     Tobacco Use    Smoking status: Former     Current packs/day: 1.00     Average packs/day: 1 pack/day for 58.9 years (58.9 ttl pk-yrs)     Types: Cigarettes     Start date:      Passive exposure: Past    Smokeless tobacco: Never    Tobacco comments:     Quit    Vaping Use    Vaping status: Never Used   Substance Use Topics    Alcohol use: Yes     Alcohol/week: 5.0 standard drinks of alcohol     Types: 5 Glasses of wine per week    Drug use: Never      E-Cigarette/Vaping    E-Cigarette Use Never User       E-Cigarette/Vaping Substances    Nicotine No     THC No     CBD No     Flavoring No     Other No     Unknown No       I have reviewed and agree with the history as documented.     HPI  Patient is a 74-year-old female, lung cancer, with metastatic disease to her brain, who presents to the emergency department for evaluation of fever, cough, fatigue, ongoing for the past several days.  Patient reports some dizziness and feeling disoriented, but states the symptoms are not new.  Patient denies any nausea, vomiting, diarrhea, chest pain, recent travel, dysuria,  urinary frequency, abnormal vaginal bleeding. Patient states she is on immunotherapy for her cancer.  States she started immunotherapy in October, receiving medication every 3 weeks.  Last dose 1 week ago.     Review of Systems   All other systems reviewed and are negative.          Objective       ED Triage Vitals   Temperature Pulse Blood Pressure Respirations SpO2 Patient Position - Orthostatic VS   11/24/24 1929 11/24/24 1929 11/24/24 1929 11/24/24 1929 11/24/24 1929 11/24/24 1929   99.3 °F (37.4 °C) 105 167/92 18 96 % Lying      Temp Source Heart Rate Source BP Location FiO2 (%) Pain Score    11/24/24 1929 11/24/24 1929 11/24/24 1929 -- 11/24/24 2000    Oral Monitor Right arm  4      Vitals      Date and Time Temp Pulse SpO2 Resp BP Pain Score FACES Pain Rating User   11/25/24 0200 -- 93 94 % 18 102/68 No Pain -- SB   11/25/24 0100 -- 73 97 % 18 144/75 -- -- SB   11/25/24 0030 -- 78 97 % 18 132/80 No Pain -- SB   11/24/24 2330 -- 72 96 % 18 130/77 -- -- SB   11/24/24 2300 -- 76 96 % 18 135/80 No Pain -- SB   11/24/24 2200 -- 78 95 % 18 120/70 No Pain -- SB   11/24/24 2115 -- 81 95 % 18 119/69 No Pain -- SB   11/24/24 2045 -- 87 93 % -- 128/63 -- -- CF   11/24/24 2030 -- 88 95 % 18 130/70 -- -- SB   11/24/24 2015 -- 90 96 % 18 136/66 -- -- SB   11/24/24 2000 -- 91 98 % 18 122/72 4 -- SB   11/24/24 1945 -- 95 98 % 18 130/73 -- -- SB   11/24/24 1929 99.3 °F (37.4 °C) 105 96 % 18 167/92 -- -- CF            Physical Exam  Vitals reviewed.   Constitutional:       General: She is not in acute distress.     Appearance: Normal appearance.   HENT:      Right Ear: Tympanic membrane, ear canal and external ear normal.      Left Ear: Tympanic membrane, ear canal and external ear normal.      Nose: Nose normal.      Mouth/Throat:      Mouth: Mucous membranes are moist.   Eyes:      Pupils: Pupils are equal, round, and reactive to light.   Cardiovascular:      Rate and Rhythm: Normal rate and regular rhythm.      Pulses:  Normal pulses.      Heart sounds: Normal heart sounds.   Pulmonary:      Breath sounds: No wheezing, rhonchi or rales.      Comments: Decreased respiratory effort bilaterally  Abdominal:      Palpations: Abdomen is soft.      Tenderness: There is no abdominal tenderness.   Musculoskeletal:         General: Normal range of motion.      Cervical back: Normal range of motion.   Skin:     General: Skin is warm.      Capillary Refill: Capillary refill takes less than 2 seconds.   Neurological:      General: No focal deficit present.      Mental Status: She is alert and oriented to person, place, and time. Mental status is at baseline.   Psychiatric:         Mood and Affect: Mood normal.         Behavior: Behavior normal.         Thought Content: Thought content normal.         Judgment: Judgment normal.         Results Reviewed       Procedure Component Value Units Date/Time    B-Type Natriuretic Peptide(BNP) [[id number]]  (Normal) Collected: 11/24/24 1930    Lab Status: Final result Specimen: Blood from Arm, Right Updated: 11/25/24 0122     BNP 47 pg/mL     Lactic acid, plasma (w/reflex if result > 2.0) [[id number]]  (Normal) Collected: 11/24/24 2237    Lab Status: Final result Specimen: Blood from Arm, Right Updated: 11/24/24 2309     LACTIC ACID 1.4 mmol/L     Narrative:      Result may be elevated if tourniquet was used during collection.    FLU/COVID Rapid Antigen (30 min. TAT) - Preferred screening test in ED [[id number]]  (Normal) Collected: 11/24/24 2233    Lab Status: Final result Specimen: Nares from Nose Updated: 11/24/24 2303     SARS COV Rapid Antigen Negative     Influenza A Rapid Antigen Negative     Influenza B Rapid Antigen Negative    Narrative:      This test has been performed using the [employer] Ghada 2 FLU+SARS Antigen test under the Emergency Use Authorization (EUA). This test has been validated by the  and verified by the performing laboratory. The Ghada uses lateral flow immunofluorescent  sandwich assay to detect SARS-COV, Influenza A and Influenza B Antigen.     The [employer] Ghada 2 SARS Antigen test does not differentiate between SARS-CoV and SARS-CoV-2.     Negative results are presumptive and may be confirmed with a molecular assay, if necessary, for patient management. Negative results do not rule out SARS-CoV-2 or influenza infection and should not be used as the sole basis for treatment or patient management decisions. A negative test result may occur if the level of antigen in a sample is below the limit of detection of this test.     Positive results are indicative of the presence of viral antigens, but do not rule out bacterial infection or co-infection with other viruses.     All test results should be used as an adjunct to clinical observations and other information available to the provider.    FOR PEDIATRIC PATIENTS - copy/paste COVID Guidelines URL to browser: [email or web address]    [facility] draw [[id number]] Collected: 11/24/24 1930    Lab Status: Final result Specimen: Blood from Arm, Right Updated: 11/24/24 2302    Narrative:      The following orders were created for panel order [facility] draw.  Procedure                               Abnormality         Status                     ---------                               -----------         ------                     Light Blue Top on hold[[id number]]                           Final result               Green / Yellow tube on hold[[id number]]                      Final result               Green / Black tube on hold[[id number]]                       Final result                 Please view results for these tests on the individual orders.    TSH, 3rd generation with Free T4 reflex [[id number]]  (Normal) Collected: 11/24/24 1930    Lab Status: Final result Specimen: Blood from Arm, Right Updated: 11/24/24 2300     TSH 3RD GENERATON 1.109 uIU/mL     D-Dimer [[id number]]  (Abnormal)  Collected: 11/24/24 2127    Lab Status: Final result Specimen: Blood from Arm, Right Updated: 11/24/24 2300     D-Dimer, Quant 1.57 ug/ml FEU     Narrative:      In the evaluation for possible pulmonary embolism, in the appropriate (Well's Score of 4 or less) patient, the age adjusted d-dimer cutoff for this patient can be calculated as:    Age x 0.01 (in ug/mL) for Age-adjusted D-dimer exclusion threshold for a patient over 50 years.    Procalcitonin [[id number]]  (Normal) Collected: 11/24/24 2127    Lab Status: Final result Specimen: Blood from Arm, Right Updated: 11/24/24 2253     Procalcitonin 0.12 ng/ml     Blood culture #2 [[id number]] Collected: 11/24/24 2233    Lab Status: In process Specimen: Blood from Arm, Right Updated: 11/24/24 2246    Blood culture #1 [[id number]] Collected: 11/24/24 2233    Lab Status: In process Specimen: Blood from Arm, Right Updated: 11/24/24 2246    Urine culture [[id number]] Collected: 11/24/24 2127    Lab Status: In process Specimen: Urine, Clean Catch Updated: 11/24/24 2236    Urine Microscopic [[id number]]  (Abnormal) Collected: 11/24/24 2127    Lab Status: Final result Specimen: Urine, Clean Catch Updated: 11/24/24 2229     RBC, UA 2-4 /hpf      WBC, UA 4-10 /hpf      Epithelial Cells Occasional /hpf      Bacteria, UA None Seen /hpf     UA w Reflex to Microscopic w Reflex to Culture [[id number]]  (Abnormal) Collected: 11/24/24 2127    Lab Status: Final result Specimen: Urine, Clean Catch Updated: 11/24/24 2204     Color, UA Yellow     Clarity, UA Clear     Specific Gravity, UA 1.033     pH, UA 5.5     Leukocytes, UA Trace     Nitrite, UA Negative     Protein, UA 50 (1+) mg/dl      Glucose, UA Negative mg/dl      Ketones, UA Trace mg/dl      Urobilinogen, UA <2.0 mg/dl      Bilirubin, UA Negative     Occult Blood, UA Small    HS Troponin I 2hr [[id number]]  (Normal) Collected: 11/24/24 2127    Lab Status: Final result Specimen: Blood from Arm, Right Updated: 11/24/24 2202     hs TnI  2hr 5 ng/L      Delta 2hr hsTnI 0 ng/L     RBC Morphology Reflex Test [[id number]] Collected: 11/24/24 1930    Lab Status: Final result Specimen: Blood from Arm, Right Updated: 11/24/24 2101    CBC and differential [[id number]]  (Abnormal) Collected: 11/24/24 1930    Lab Status: Final result Specimen: Blood from Arm, Right Updated: 11/24/24 2021     WBC 2.26 Thousand/uL      RBC 2.46 Million/uL      Hemoglobin 8.0 g/dL      Hematocrit 24.6 %       fL      MCH 32.5 pg      MCHC 32.5 g/dL      RDW 15.1 %      MPV 9.5 fL      Platelets 116 Thousands/uL     Narrative:      This is an appended report.  These results have been appended to a previously verified report.    Manual Differential(PHLEBS Do Not Order) [[id number]]  (Abnormal) Collected: 11/24/24 1930    Lab Status: Final result Specimen: Blood from Arm, Right Updated: 11/24/24 2021     Segmented % 66 %      Bands % 2 %      Lymphocytes % 11 %      Monocytes % 14 %      Eosinophils % 2 %      Basophils % 1 %      Metamyelocytes % 2 %      Myelocytes % 2 %      Absolute Neutrophils 1.54 Thousand/uL      Absolute Lymphocytes 0.25 Thousand/uL      Absolute Monocytes 0.32 Thousand/uL      Absolute Eosinophils 0.05 Thousand/uL      Absolute Basophils 0.02 Thousand/uL      Absolute Metamyelocytes 0.05 Thousand/uL      Absolute Myelocytes 0.05 Thousand/uL      Total Counted --     RBC Morphology Present     Platelet Estimate Borderline     Anisocytosis Present     Polychromasia Present    HS Troponin 0hr (reflex protocol) [[id number]]  (Normal) Collected: 11/24/24 1930    Lab Status: Final result Specimen: Blood from Arm, Right Updated: 11/24/24 2013     hs TnI 0hr 5 ng/L     Comprehensive metabolic panel [[id number]]  (Abnormal) Collected: 11/24/24 1930    Lab Status: Final result Specimen: Blood from Arm, Right Updated: 11/24/24 2006     Sodium 139 mmol/L      Potassium 3.8 mmol/L      Chloride 105 mmol/L      CO2 23 mmol/L      ANION GAP 11 mmol/L      BUN 22 mg/dL       Creatinine 0.99 mg/dL      Glucose 129 mg/dL      Calcium 9.1 mg/dL      AST 10 U/L      ALT 12 U/L      Alkaline Phosphatase 41 U/L      Total Protein 6.7 g/dL      Albumin 3.8 g/dL      Total Bilirubin 0.27 mg/dL      eGFR 56 ml/min/1.73sq m     Narrative:      National Kid[employer] Foundation guidelines for Chronic Kidney Disease (CKD):     Stage 1 with normal or high GFR (GFR > 90 mL/min/1.73 square meters)    Stage 2 Mild CKD (GFR = 60-89 mL/min/1.73 square meters)    Stage 3A Moderate CKD (GFR = 45-59 mL/min/1.73 square meters)    Stage 3B Moderate CKD (GFR = 30-44 mL/min/1.73 square meters)    Stage 4 Severe CKD (GFR = 15-29 mL/min/1.73 square meters)    Stage 5 End Stage CKD (GFR <15 mL/min/1.73 square meters)  Note: GFR calculation is accurate only with a steady state creatinine    Magnesium [[id number]]  (Abnormal) Collected: 11/24/24 1930    Lab Status: Final result Specimen: Blood from Arm, Right Updated: 11/24/24 2006     Magnesium 1.7 mg/dL     APTT [[id number]]  (Normal) Collected: 11/24/24 1930    Lab Status: Final result Specimen: Blood from Arm, Right Updated: 11/24/24 2003     PTT 29 seconds     Protime-INR [[id number]]  (Normal) Collected: 11/24/24 1930    Lab Status: Final result Specimen: Blood from Arm, Right Updated: 11/24/24 2003     Protime 13.0 seconds      INR 0.91    Narrative:      INR Therapeutic Range    Indication                                             INR Range      Atrial Fibrillation                                               2.0-3.0  Hypercoagulable State                                    2.0.2.3  Left Ventricular Asist Device                            2.0-3.0  Mechanical Heart Valve                                  -    Aortic(with afib, MI, embolism, HF, LA enlargement,    and/or coagulopathy)                                     2.0-3.0 (2.5-3.5)     Mitral                                                             2.5-3.5  Prosthetic/Bioprosthetic Heart Valve                2.0-3.0  Venous thromboembolism (VTE: VT, PE        2.0-3.0            CT pe study w abdomen pelvis w contrast   Final Interpretation by Felipe Wood MD (11/25 8261)      1.  Bilateral pulmonary emboli   2.  The calculated ratio of right ventricular to left ventricular diameter (RV/LV ratio) is 1.4. This is greater than 0.9, which is abnormal and indicates right heart strain. An abnormal RV/LV ratio has been shown to be associated with an increased risk    of 30 day mortality in the setting of acute pulmonary embolism.   3.  Right upper lobe opacities consistent with pneumonia   4.  Mild pulmonary edema   5.  Stable right upper lobe pulmonary mass   6.  Stable right renal lesion suspicious for metastasis   7.  Decrease in size of mass in the right ischio rectal fossa      I personally discussed impression 1-3 with Fitz Burr at 11/25/24 12:44 AM               Workstation performed: [id number]         CT head without contrast   Final Interpretation by Konstantin Alvarado MD (11/24 8757)      Reidentified vasogenic edema at the left parietal and left occipital lobe similar to the prior exam, attributed to the patient's history of brain metastases, poorly visualized on this unenhanced study. There is similar mass effect on the posterior horn    of the left lateral ventricle. No midline shift.      No acute intracranial abnormality.      Mild chronic small vessel ischemic changes.               Workstation performed: [id number]         XR chest 2 views   ED Interpretation by Fitz OLVERA DO (11/25 0211)   RUL infiltrate, as interpreted by me.           ECG 12 Lead Documentation Only    Date/Time: 11/25/2024 2:16 AM    Performed by: Fitz OLVERA DO  Authorized by: Fitz OLVERA DO    Indications / Diagnosis:  Sob tachycardia  ECG reviewed by me, the ED Provider: yes    Patient location:  ED  Previous ECG:     Previous ECG:  Compared to current    Similarity:  No change  Interpretation:      Interpretation: non-specific    Quality:     Tracing quality:  Limited by artifact  Rate:     ECG rate:  101    ECG rate assessment: tachycardic    Rhythm:     Rhythm: sinus tachycardia    Ectopy:     Ectopy: none    QRS:     QRS axis:  Normal    QRS intervals:  Normal  ST segments:     ST segments:  Normal      ED Medication and Procedure Management   Prior to Admission Medications   Prescriptions Last Dose Informant Patient Reported? Taking?   Probiotic Product (PROBIOTIC DAILY PO) 11/24/2024 Morning Self Yes Yes   Sig: Take 1 capsule by mouth daily ON HOLD   acetaminophen (TYLENOL) 325 mg tablet 11/24/2024 at  4:00 PM  No Yes   Sig: Take 2 tablets (650 mg total) by mouth every 6 (six) hours   amLODIPine (NORVASC) 5 mg tablet 11/24/2024 Morning Self No Yes   Sig: Take 1 tablet (5 mg total) by mouth daily   Patient taking differently: Take 5 mg by mouth daily as needed (For SBP >150)   dexamethasone (DECADRON) 2 mg tablet 11/24/2024 Morning  No Yes   Sig: Take 1 tablet (2 mg total) by mouth every 12 (twelve) hours for 4 days, THEN 1 tablet (2 mg total) daily.   folic acid (FOLVITE) 1 mg tablet 11/24/2024 Morning  No Yes   Sig: Take 1 tablet (1 mg total) by mouth daily   gabapentin (NEURONTIN) 100 mg capsule 11/24/2024 Noon  No Yes   Sig: Take 1 capsule (100 mg total) by mouth 2 (two) times a day before breakfast and lunch   gabapentin (NEURONTIN) 100 mg capsule 11/24/2024 Evening  No Yes   Sig: Take 1 capsule PO in the AM, 1 capsule PO in the afternoon, and 3 capsules PO HS.   levETIRAcetam (KEPPRA) 1000 MG tablet 11/24/2024 Evening  No Yes   Sig: Take 1 tablet (1,000 mg total) by mouth every 12 (twelve) hours   levothyroxine 50 mcg tablet 11/24/2024 Morning  No Yes   Sig: Take 1 tablet (50 mcg total) by mouth daily in the early morning   pantoprazole (PROTONIX) 40 mg tablet 11/24/2024 Morning  No Yes   Sig: Take 1 tablet (40 mg total) by mouth daily in the early morning   senna (SENOKOT) 8.6 MG tablet Past  Week Self Yes Yes   Sig: Take 1 tablet by mouth daily Takes one every other day   vitamin B-12 (VITAMIN B-12) 1,000 mcg tablet 11/24/2024 Morning Self No Yes   Sig: Take 1 tablet (1,000 mcg total) by mouth daily      Facility-Administered Medications: None     Patient's Medications   Discharge Prescriptions    No medications on file     No discharge procedures on file.  ED SEPSIS DOCUMENTATION   Time reflects when diagnosis was documented in both MDM as applicable and the Disposition within this note       Time User Action Codes Description Comment    11/25/2024  1:48 AM Carlos Burroughs Add [I26.99] Bilateral pulmonary embolism (HCC)     11/25/2024  1:48 AM Carlos Burroughs S Add [J18.9] Pneumonia     11/25/2024  1:48 AM Carlos Burroughs S Add [A41.9] Sepsis (HCC)     11/25/2024  1:48 AM Manjeet, Carlos S Add [D70.9] Neutropenia ([facility])            Initial Sepsis Screening       Row Name 11/25/24 0152                Is the patient's history suggestive of a new or worsening infection? Yes (Proceed)  -BAMBI        Suspected source of infection pneumonia  -BAMBI        Indicate SIRS criteria Tachycardia > 90 bpm;Leukocytosis (WBC > 65260 IJL) OR Leukopenia (WBC <4000 IJL) OR Bandemia (WBC >10% bands)  -BAMBI        Are two or more of the above signs & symptoms of infection both present and new to the patient? Yes (Proceed)  -BAMBI        Assess for evidence of organ dysfunction: Are any of the below criteria present within 6 hours of suspected infection and SIRS criteria that are NOT considered to be chronic conditions? --                  User Key  (r) = Recorded By, (t) = Taken By, (c) = Cosigned By      Initials Name Provider Type    BAMBI Fitz Leslie V,  Resident                       Fitz Leslie V, DO  11/25/24 0213       Fitz Leslie V, DO  11/25/24 0217       Fitz Leslie V, DO  11/25/24 0248

## 2024-11-26 ENCOUNTER — DOCUMENTATION (OUTPATIENT)
Dept: HEMATOLOGY ONCOLOGY | Facility: CLINIC | Age: 74
End: 2024-11-26

## 2024-11-26 PROBLEM — A41.9 SEPSIS, UNSPECIFIED ORGANISM (HCC): Status: ACTIVE | Noted: 2024-11-26

## 2024-11-26 LAB
ANION GAP SERPL CALCULATED.3IONS-SCNC: 9 MMOL/L (ref 4–13)
APTT PPP: 72 SECONDS (ref 23–34)
APTT PPP: 83 SECONDS (ref 23–34)
BACTERIA UR CULT: NORMAL
BASOPHILS # BLD MANUAL: 0 THOUSAND/UL (ref 0–0.1)
BASOPHILS NFR MAR MANUAL: 0 % (ref 0–1)
BUN SERPL-MCNC: 15 MG/DL (ref 5–25)
CALCIUM SERPL-MCNC: 9 MG/DL (ref 8.4–10.2)
CHLORIDE SERPL-SCNC: 104 MMOL/L (ref 96–108)
CO2 SERPL-SCNC: 25 MMOL/L (ref 21–32)
CREAT SERPL-MCNC: 0.91 MG/DL (ref 0.6–1.3)
EOSINOPHIL # BLD MANUAL: 0 THOUSAND/UL (ref 0–0.4)
EOSINOPHIL NFR BLD MANUAL: 0 % (ref 0–6)
ERYTHROCYTE [DISTWIDTH] IN BLOOD BY AUTOMATED COUNT: 15.7 % (ref 11.6–15.1)
GFR SERPL CREATININE-BSD FRML MDRD: 62 ML/MIN/1.73SQ M
GLUCOSE SERPL-MCNC: 84 MG/DL (ref 65–140)
HCT VFR BLD AUTO: 23 % (ref 34.8–46.1)
HGB BLD-MCNC: 7.4 G/DL (ref 11.5–15.4)
LACTATE SERPL-SCNC: 1.2 MMOL/L (ref 0.5–2)
LYMPHOCYTES # BLD AUTO: 0.11 THOUSAND/UL (ref 0.6–4.47)
LYMPHOCYTES # BLD AUTO: 3 % (ref 14–44)
MCH RBC QN AUTO: 32.6 PG (ref 26.8–34.3)
MCHC RBC AUTO-ENTMCNC: 32.2 G/DL (ref 31.4–37.4)
MCV RBC AUTO: 101 FL (ref 82–98)
METAMYELOCYTE ABSOLUTE CT: 0.04 THOUSAND/UL (ref 0–0.1)
METAMYELOCYTES NFR BLD MANUAL: 1 % (ref 0–1)
MONOCYTES # BLD AUTO: 0 THOUSAND/UL (ref 0–1.22)
MONOCYTES NFR BLD: 0 % (ref 4–12)
MRSA NOSE QL CULT: NORMAL
NEUTROPHILS # BLD MANUAL: 3.39 THOUSAND/UL (ref 1.85–7.62)
NEUTS BAND NFR BLD MANUAL: 2 % (ref 0–8)
NEUTS HYPERSEG BLD QL SMEAR: PRESENT
NEUTS SEG NFR BLD AUTO: 94 % (ref 43–75)
PLATELET # BLD AUTO: 127 THOUSANDS/UL (ref 149–390)
PLATELET BLD QL SMEAR: ABNORMAL
PMV BLD AUTO: 9.3 FL (ref 8.9–12.7)
POTASSIUM SERPL-SCNC: 4 MMOL/L (ref 3.5–5.3)
PROCALCITONIN SERPL-MCNC: 0.15 NG/ML
RBC # BLD AUTO: 2.27 MILLION/UL (ref 3.81–5.12)
RBC MORPH BLD: NORMAL
SODIUM SERPL-SCNC: 138 MMOL/L (ref 135–147)
VANCOMYCIN TROUGH SERPL-MCNC: 12.4 UG/ML (ref 10–20)
WBC # BLD AUTO: 3.53 THOUSAND/UL (ref 4.31–10.16)

## 2024-11-26 PROCEDURE — 80202 ASSAY OF VANCOMYCIN: CPT

## 2024-11-26 PROCEDURE — 85027 COMPLETE CBC AUTOMATED: CPT

## 2024-11-26 PROCEDURE — 85730 THROMBOPLASTIN TIME PARTIAL: CPT | Performed by: INTERNAL MEDICINE

## 2024-11-26 PROCEDURE — 84145 PROCALCITONIN (PCT): CPT

## 2024-11-26 PROCEDURE — 85730 THROMBOPLASTIN TIME PARTIAL: CPT | Performed by: FAMILY MEDICINE

## 2024-11-26 PROCEDURE — 85007 BL SMEAR W/DIFF WBC COUNT: CPT

## 2024-11-26 PROCEDURE — 83605 ASSAY OF LACTIC ACID: CPT

## 2024-11-26 PROCEDURE — 99232 SBSQ HOSP IP/OBS MODERATE 35: CPT | Performed by: FAMILY MEDICINE

## 2024-11-26 PROCEDURE — 80048 BASIC METABOLIC PNL TOTAL CA: CPT | Performed by: INTERNAL MEDICINE

## 2024-11-26 RX ORDER — IBUPROFEN 400 MG/1
400 TABLET, FILM COATED ORAL EVERY 6 HOURS PRN
Status: DISCONTINUED | OUTPATIENT
Start: 2024-11-26 | End: 2024-12-06 | Stop reason: HOSPADM

## 2024-11-26 RX ADMIN — ACETAMINOPHEN 650 MG: 325 TABLET, FILM COATED ORAL at 05:28

## 2024-11-26 RX ADMIN — GABAPENTIN 100 MG: 100 CAPSULE ORAL at 10:41

## 2024-11-26 RX ADMIN — LEVETIRACETAM 1000 MG: 250 TABLET, FILM COATED ORAL at 21:14

## 2024-11-26 RX ADMIN — GABAPENTIN 300 MG: 300 CAPSULE ORAL at 21:14

## 2024-11-26 RX ADMIN — VANCOMYCIN HYDROCHLORIDE 1000 MG: 1 INJECTION, SOLUTION INTRAVENOUS at 11:32

## 2024-11-26 RX ADMIN — IBUPROFEN 400 MG: 400 TABLET, FILM COATED ORAL at 10:41

## 2024-11-26 RX ADMIN — CEFTRIAXONE 1000 MG: 2 INJECTION, POWDER, FOR SOLUTION INTRAMUSCULAR; INTRAVENOUS at 14:21

## 2024-11-26 RX ADMIN — LEVETIRACETAM 1000 MG: 250 TABLET, FILM COATED ORAL at 09:36

## 2024-11-26 RX ADMIN — GABAPENTIN 100 MG: 100 CAPSULE ORAL at 05:43

## 2024-11-26 RX ADMIN — HEPARIN SODIUM 18 UNITS/KG/HR: 10000 INJECTION, SOLUTION INTRAVENOUS at 05:43

## 2024-11-26 RX ADMIN — LEVOTHYROXINE SODIUM 50 MCG: 0.05 TABLET ORAL at 05:28

## 2024-11-26 RX ADMIN — CYANOCOBALAMIN TAB 500 MCG 1000 MCG: 500 TAB at 09:36

## 2024-11-26 RX ADMIN — PANTOPRAZOLE SODIUM 40 MG: 40 TABLET, DELAYED RELEASE ORAL at 05:29

## 2024-11-26 RX ADMIN — Medication 1 PACKET: at 09:36

## 2024-11-26 RX ADMIN — DEXAMETHASONE 2 MG: 2 TABLET ORAL at 09:37

## 2024-11-26 RX ADMIN — ACETAMINOPHEN 650 MG: 325 TABLET, FILM COATED ORAL at 23:49

## 2024-11-26 RX ADMIN — FOLIC ACID 1 MG: 1 TABLET ORAL at 09:44

## 2024-11-26 NOTE — CASE MANAGEMENT
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     Palliative care patient 2024     Cancer associated pain 2024     Goals of care, counseling/discussion 2024     Right hip pain 09/10/2024     Altered mental status 2024     Hypothyroidism 2024     Abnormal imaging of central nervous system 2024     Acute metabolic encephalopathy 2024     Malignant neoplasm metastatic to brain (HCC) 2024     Brain mass 2024     Metastatic cancer to brain (HCC) 2024     Visual disturbance 2024     Dehydration 2024     Moderate protein-calorie malnutrition (HCC) 2024     Bilateral leg pain 2024     Insomnia 2024     Malignant neoplasm of upper lobe, right bronchus or lung (HCC) 2024     Age-related osteoporosis without current pathological fracture 2023     Encounter for monitoring of hydroxyurea therapy 2023     JAK2 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 2023     Hypertension 08/10/2022     Mixed hyperlipidemia 08/10/2022     Essential thrombocytosis (HCC) 2020     TB lung, 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     Psoriatic arthritis (HCC) 09/10/2019       LOS (days): 1  Geometric Mean LOS (GMLOS) (days):   Days to GMLOS:     OBJECTIVE:  PATIENT READMITTED TO HOSPITAL  Risk of Unplanned Readmission Score: 47.69         Current admission status: Inpatient       Preferred  Pharmacy:   [id number] - [place], [place] - [address]  [facility] [address]  Phone: [phone] Fax: [phone]    Primary Care Provider: Rafy Angelo MD    Primary Insurance: MEDICARE  Secondary Insurance: AARP    ASSESSMENT:  Active Health Care Proxies    There are no active Health Care Proxies on file.                 Readmission Root Cause  30 Day Readmission: Yes  During your hospital stay, did someone (provider, nurse, ) explain your care to you in a way you could understand?: Yes  Did you feel medically stable to leave the hospital?: Yes  Were you able to pay for your medication at the pharmacy?: Yes  Did you have reliable transportation to take you to your appointments?: Yes  During previous admission, was a post-acute recommendation made?: Yes  What post-acute resources were offered?: [facility]  Patient was readmitted due to: Malignant neoplasm of upper lobe, right bronchus or lung  Action Plan: Further CM follow up as needed for dcp.    Patient Information  Admitted from:: Home  Mental Status: Alert  During Assessment patient was accompanied by: Not accompanied during assessment  Assessment information provided by:: Patient  Primary Caregiver: Self  Support Systems: Other (Comment), Family members  What city do you live in[place]  Home entry access options. Select all that apply.: Stairs  Number of steps to enter home.: 6  Do the steps have railings?: Yes  Type of Current Residence: [address] [place] home  Upon entering residence, is there a bedroom on the main floor (no further steps)?: No  A bedroom is located on the following floor levels of residence (select all that apply):: 2nd Floor  Upon entering residence, is there a bathroom on the main floor (no further steps)?: No  Indicate which floors of current residence have a bathroom (select all the apply):: 2nd Floor  Number of steps to 2nd floor from main floor:  (13)  Living  Arrangements: Lives Alone  Is patient a ?: No    Activities of Daily Living Prior to Admission  Functional Status: Independent  Completes ADLs independently?: Yes  Ambulates independently?: Yes  Does patient use assisted devices?: No  Does patient currently own DME?: No  Does patient have a history of Outpatient Therapy (PT/OT)?: No  Does the patient have a history of Short-Term Rehab?: No  Does patient have a history of HHC?: Yes (Patient believes [facility] was [facility])  Does patient currently have HHC?: No         Patient Information Continued  Income Source: Pension/[employer]  Does patient have prescription coverage?: Yes  Does patient receive dialysis treatments?: No  Does patient have a history of substance abuse?: No  Does patient have a history of Mental Health Diagnosis?: No         Means of Transportation  Means of Transport to Appts:: Family transport          DISCHARGE DETAILS:    Discharge planning discussed with:: Patient at bedside  Freedom of Choice: Yes  Comments - Freedom of Choice: CM spoke with patient at bedside re: assessment and dcp. Patient provided assessment information. CM & Patient discussed HHC. Patient declines referrals being sent at this time. Patient stated she has an advocate who comes to her house and helps her daily with anything that she needs. Patient stated that her ex- also stops in every day to check on her and help her with anything.  CM contacted family/caregiver?: Yes  Were Treatment Team discharge recommendations reviewed with patient/caregiver?: Yes  Did patient/caregiver verbalize understanding of patient care needs?: Yes  Were patient/caregiver advised of the risks associated with not following Treatment Team discharge recommendations?: Yes    Contacts  Patient Contacts: Patient  Relationship to Patient:: Other (Comment) (self)  Contact Method: In Person  Reason/Outcome: Emergency Contact, Discharge Planning    Requested Home Health Care         Is the patient  interested in HHC at discharge?: No    DME Referral Provided  Referral made for DME?: No

## 2024-11-26 NOTE — ASSESSMENT & PLAN NOTE
Component      Latest Ref Rn 2/16/2024 3/12/2024 4/5/2024 4/30/2024 5/20/2024   Absolute Neutrophils      1.85 - 7.62 Thousand/uL 6.06  5.47  4.95  5.71  5.87      Component      Latest Ref Rng 5/28/2024 6/3/2024 6/10/2024 6/18/2024 6/22/2024   Absolute Neutrophils      1.85 - 7.62 Thousand/uL 5.89  6.31  5.21  3.42  3.63      Component      Latest Ref Rng 6/23/2024 6/24/2024 7/1/2024 7/2/2024 7/3/2024 7/15/2024   Absolute Neutrophils      1.85 - 7.62 Thousand/uL 2.38  1.58 (L)  1.81 (L)  2.08  1.83 (L)  2.08      Component      Latest Ref Rn 7/29/2024 8/21/2024 8/22/2024 8/23/2024 8/24/2024   Absolute Neutrophils      1.85 - 7.62 Thousand/uL 2.95  6.57  5.54  6.22  4.46      Component      Latest Ref Rn 9/9/2024 9/10/2024 9/11/2024 9/12/2024 9/13/2024   Absolute Neutrophils      1.85 - 7.62 Thousand/uL 5.00  3.22  5.80  4.26  7.84 (H)      Component      Latest Ref Rng 10/3/2024 10/16/2024 10/21/2024 10/22/2024 10/27/2024   Absolute Neutrophils      1.85 - 7.62 Thousand/uL 7.17  1.58 (L)  2.79  4.39  9.78 (H)      Component      Latest Ref Rng 11/11/2024 11/24/2024   Absolute Neutrophils      1.85 - 7.62 Thousand/uL 9.40 (H)  1.54 (L)       Recent Labs     12/02/24  0329 12/03/24  0559 12/04/24  0514   AST 12* 10* 13   ALT 10 9 12   ALKPHOS 38 33* 41   TBILI 0.32 0.25 0.26       Fever at home 101.3        PLAN:  Broad specturm antibiotics w/ levofloxacin instead of Cefepime due to allergy and vanc: Consider fungal coverage  Peripheral smear pending  Urine cultures pending  Consider Consult ID   Consider Heme/Onc consult  Neutropenic precautions

## 2024-11-26 NOTE — ASSESSMENT & PLAN NOTE
PERC 2  WELLS 2.5  PESI 104 Class III Intermediate risk   [id number]: CT pe study w abdomen pelvis w contrast  PULMONARY ARTERIAL TREE: Embolism straddles between the middle lobe and right lower lobe basal segment arteries, as seen on series 301/112, this is new from the most recent prior.   Additional embolism noted within the left lower lobe basal arteries straddling into the lower lingular segment, as noted on series 301/, also new from prior.  RV diameter at the level of the AV valve = 3. 9 cm  LV diameter at the level of the AV valve = 2.7 cm  Ratio equals 1.4  CLASSIFICATION Acute hemodynamically stable bilateral right sided segmental left basilar associated with SOB most likely provoked from cancer hx while on immunotherapy   ECHO : EF 65%, diastolic dysfunction grade I. Mitral valve has mild annular calcification and tricuspid valve has mild regurgitation.     PLAN  Heparin DRIP   Montior for HIT if happens stop heparin give GIVE direct thrombin inhibitors like argatroban, lepirutidn, vealiruidn, fandaparinaux   Eliquis was price checked, her insurance prefers Xarelto, so will transition her to this medication once she is more stable.

## 2024-11-26 NOTE — PROGRESS NOTES
Chart reviewed for scheduled outreach to Ayla now that she is on Tx to reassess for any barriers to care and offer any supportive services that may be needed. Ayla is currently IP. I will continue to follow and will outreach once she is D/C.

## 2024-11-26 NOTE — CASE MANAGEMENT
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     Palliative care patient 2024     Cancer associated pain 2024     Goals of care, counseling/discussion 2024     Right hip pain 09/10/2024     Altered mental status 2024     Hypothyroidism 2024     Abnormal imaging of central nervous system 2024     Acute metabolic encephalopathy 2024     Malignant neoplasm metastatic to brain (HCC) 2024     Brain mass 2024     Metastatic cancer to brain (HCC) 2024     Visual disturbance 2024     Dehydration 2024     Moderate protein-calorie malnutrition (HCC) 2024     Bilateral leg pain 2024     Insomnia 2024     Malignant neoplasm of upper lobe, right bronchus or lung (HCC) 2024     Age-related osteoporosis without current pathological fracture 2023     Encounter for monitoring of hydroxyurea therapy 2023     JAK2 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 2023     Hypertension 08/10/2022     Mixed hyperlipidemia 08/10/2022     Essential thrombocytosis (HCC) 2020     TB lung, 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     Psoriatic arthritis (HCC) 09/10/2019       LOS (days): 1  Geometric Mean LOS (GMLOS) (days): 3.8  Days to GMLOS:2.3     OBJECTIVE:  Risk of Unplanned Readmission Score: 39.11         Current admission status: Inpatient   Preferred Pharmacy:   [id number] [address]  [address]  [facility] [address]  Phone: [phone] Fax: [phone]    Primary Care Provider: Rafy Angelo MD    Primary Insurance: MEDICARE  Secondary Insurance: AARP    DISCHARGE DETAILS:                                          Other Referral/Resources/Interventions Provided:  Interventions: Prescription Price Check       CM was asked to verify coverage and out of pocket cost of Elquis for patient.    CM called patient's [facility] pharmacy and spoke with Eric. He reported that when they try to process this the insurance pushes back and reports preference would be for Xarelto.    CM passed this information on to [facility] provider.    CM department will continue to follow to assist with discharge coordination.

## 2024-11-26 NOTE — ASSESSMENT & PLAN NOTE
Blood Pressure: 134/79  Recent Labs     12/03/24  0559 12/03/24  1824 12/04/24  0514   HGB 7.5* 8.5* 8.9*   BL 8-11    Plan:  Continue to monitor closely while pt on heparin drip  Transfuse if Hb < 7

## 2024-11-26 NOTE — ASSESSMENT & PLAN NOTE
Blood Pressure: 118/79  Recent Labs     11/24/24  1930 11/25/24  0522 11/26/24  0928   HGB 8.0* 7.3* 7.4*   BL 8-11    Plan:  Continue to monitor closely while pt on heparin drip  Transfuse if Hb < 7

## 2024-11-26 NOTE — PLAN OF CARE
Problem: Potential for Falls  Goal: Patient will remain free of falls  Description: INTERVENTIONS:  - Educate patient/family on patient safety including physical limitations  - Instruct patient to call for assistance with activity   - Consult OT/PT to assist with strengthening/mobility   - Keep Call bell within reach  - Keep bed low and locked with side rails adjusted as appropriate  - Keep care items and personal belongings within reach  - Consider moving patient to room near nurses station  Outcome: Progressing     Problem: INFECTION - ADULT  Goal: Absence or prevention of progression during hospitalization  Description: INTERVENTIONS:  - Assess and monitor for signs and symptoms of infection  - Monitor lab/diagnostic results  - Monitor all insertion sites, i.e. indwelling lines, tubes, and drains  - Monitor endotracheal if appropriate and nasal secretions for changes in amount and color  - [place] appropriate cooling/warming therapies per order  - Administer medications as ordered  - Instruct and encourage patient and family to use good hand hygiene technique  - Identify and instruct in appropriate isolation precautions for identified infection/condition  Outcome: Progressing     Problem: SAFETY ADULT  Goal: Patient will remain free of falls  Description: INTERVENTIONS:  - Educate patient/family on patient safety including physical limitations  - Instruct patient to call for assistance with activity   - Consult OT/PT to assist with strengthening/mobility   - Keep Call bell within reach  - Keep bed low and locked with side rails adjusted as appropriate  - Keep care items and personal belongings within reach  - Initiate and maintain comfort rounds  - Consider moving patient to room near nurses station  Outcome: Progressing     Problem: Knowledge Deficit  Goal: Patient/family/caregiver demonstrates understanding of disease process, treatment plan, medications, and discharge instructions  Description: Complete  learning assessment and assess knowledge base.  Interventions:  - Provide teaching at level of understanding  - Provide teaching via preferred learning methods  Outcome: Progressing

## 2024-11-26 NOTE — ASSESSMENT & PLAN NOTE
Stage III NSCLC, new onset brain mets 7/30/24  Pt is on immunotherapy ketruda and carboplatin last dose 14NOV24   Oncologist Dr. Castro with [facility].    Radiologist Dr. Vuong with [facility]    OP Pembrolizumab + PEMEtrexed + CARBOplatin Q 21 Days Day 1 Cycle 3 due 05DEC24  Last dose 14NOV24    PLAN:  Onc consulted and following

## 2024-11-26 NOTE — ASSESSMENT & PLAN NOTE
Recent Labs     11/24/24 1930 11/25/24  0522 11/26/24  0928   WBC 2.26* 2.16* 3.53*     Recent Labs     11/24/24 1930 11/25/24  0732 11/26/24  0515   CREATININE 0.99 0.81 0.91   EGFR 56 71 62     Estimated Creatinine Clearance: 42.9 mL/min (by C-G formula based on SCr of 0.91 mg/dL).  Recent Labs     11/24/24 2237 11/26/24 0928   LACTICACID 1.4 1.2     Recent Labs     11/24/24 2127 11/25/24  0732 11/26/24 0515   PROCALCITONI 0.12 0.10 0.15     Vitals:    11/26/24 0913   BP: 118/79   Pulse: 96   Resp: 19   Temp: 98.5 °F (36.9 °C)   SpO2: 90%     Temperature: 98.5 °F (36.9 °C)  Temp Source: Oral  Results from last 7 days   Lab Units 11/24/24 2127   LEUKOCYTES UA  Trace*   NITRITE UA  Negative   GLUCOSE UA mg/dl Negative   KETONES UA mg/dl Trace*   BLOOD UA  Small*   WBC UA /hpf 4-10*   RBC UA /hpf 2-4*   BACTERIA UA /hpf None Seen     Recent Labs     11/24/24 2127 11/24/24 2233   BLOODCX  --  No Growth at 24 hrs.  No Growth at 24 hrs.   URINECX <10,000 cfu/ml  --      CT pe study w abdomen pelvis w contrast  Result Date: 11/25/2024  Right upper lobe opacities consistent with pneumonia 4.  Mild pulmonary edema       DEFINITIONS   SIRS: two of the following that cannot be better explained by another cause  HR:>90/min  and WBC: <4x109/L (<4000/mm3), >12x109/L (>12,000/mm3), or =10% bands  SEPSIS  SIRS AND  Confirmed OR Suspected infection Lungs  qSOFA=0  Blood cultures negative at 24 hours  Procal continues to be negative    PLAN:  LABS:   Follow Blood cultures  MRSA Nares Pending  Trend CBC and fever as markers of ongoing infection    REASSESS DAILY: Reassess pt response to txt daily. Document improvement or worsening status.     ABX: Broad Spectrum Abx w/in 1 hr  Levoquin 750mg IV q48hrs (renal dose) Given one dose 24NOV24, stopped and switched to ceftriaxone 1,000 mg Q24Hr on 11/25  Vanc 750mg IV q12hrs Start 23:00 24NOV24  Duration 7-10 days     NUTRITION:   Good nutrition and tight glucose management    No current benefit of IV Vit C

## 2024-11-26 NOTE — PROGRESS NOTES
Progress Note - Hospitalist   Name: Ayla Nye 74 y.o. female I MRN: [id number]  Unit/Bed#: S -01 I Date of Admission: 2024   Date of Service: 2024 I Hospital Day: 1    Assessment & Plan  Malignant neoplasm of upper lobe, right bronchus or lung (HCC)  Stage III NSCLC, new onset brain mets 24  Pt is on immunotherapy ketruda and carboplatin last dose    Oncologist Dr. Castro with [facility].    Radiologist Dr. 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)  Currently being treated with dexamethazone and keppra    PLAN  Continue home dexamethasone and keppra  Seizure precautions  Fall precautions  Ativan prn for seizure activity   Anemia    Blood Pressure: 118/79  Recent Labs     24  1930 24  0522 24  0928   HGB 8.0* 7.3* 7.4*   BL 8-11    Plan:  Continue to monitor closely while pt on heparin drip  Transfuse if Hb < 7      Acute pulmonary embolism (HCC)  PERC 2  WELLS 2.5  PESI 104 Class III Intermediate risk   Imagin: CT pe study w abdomen pelvis w contrast  PULMONARY ARTERIAL TREE: Embolism straddles between the middle lobe and right lower lobe basal segment arteries, as seen on series 301/112, this is new from the most recent prior.   Additional embolism noted within the left lower lobe basal arteries straddling into the lower lingular segment, as noted on series 301/98, also new from prior.  RV diameter at the level of the AV valve = 3. 9 cm  LV diameter at the level of the AV valve = 2.7 cm  Ratio equals 1.4  CLASSIFICATION Acute hemodynamically stable bilateral right sided segmental left basilar associated with SOB most likely provoked from cancer hx while on immunotherapy   ECHO : EF 65%, diastolic dysfunction grade I. Mitral valve has mild annular calcification and tricuspid valve has mild  regurgitation.     PLAN  Heparin DRIP   Montior for HIT if happens stop heparin give GIVE direct thrombin inhibitors like argatroban, lepirutidn, vealiruidn, fandaparinaux   Eliquis was price checked, her insurance prefers Xarelto, so will transition her to this medication once she is more stable.     Pneumonia    Recent Labs     11/24/24 1930 11/25/24  0522 11/26/24  0928   WBC 2.26* 2.16* 3.53*     Recent Labs     11/24/24 1930 11/25/24  0732 11/26/24  0515   CREATININE 0.99 0.81 0.91   EGFR 56 71 62     Estimated Creatinine Clearance: 42.9 mL/min (by C-G formula based on SCr of 0.91 mg/dL).  Recent Labs     11/24/24 2237 11/26/24  0928   LACTICACID 1.4 1.2     Recent Labs     11/24/24 2127 11/25/24  0732 11/26/24  0515   PROCALCITONI 0.12 0.10 0.15     Vitals:    11/26/24 0913   BP: 118/79   Pulse: 96   Resp: 19   Temp: 98.5 °F (36.9 °C)   SpO2: 90%     Temperature: 98.5 °F (36.9 °C)  Temp Source: Oral  Results from last 7 days   Lab Units 11/24/24 2127   LEUKOCYTES UA  Trace*   NITRITE UA  Negative   GLUCOSE UA mg/dl Negative   KETONES UA mg/dl Trace*   BLOOD UA  Small*   WBC UA /hpf 4-10*   RBC UA /hpf 2-4*   BACTERIA UA /hpf None Seen     Recent Labs     11/24/24 2127 11/24/24 2233   BLOODCX  --  No Growth at 24 hrs.  No Growth at 24 hrs.   URINECX <10,000 cfu/ml  --      CT pe study w abdomen pelvis w contrast  Result Date: 11/25/2024  Right upper lobe opacities consistent with pneumonia 4.  Mild pulmonary edema       DEFINITIONS   SIRS: two of the following that cannot be better explained by another cause  HR:>90/min  and WBC: <4x109/L (<4000/mm3), >12x109/L (>12,000/mm3), or =10% bands  SEPSIS  SIRS AND  Confirmed OR Suspected infection Lungs  qSOFA=0  Blood cultures negative at 24 hours  Procal continues to be negative    PLAN:  LABS:   Follow Blood cultures  MRSA Nares Pending  Trend CBC and fever as markers of ongoing infection    REASSESS DAILY: Reassess pt response to txt daily. Document  improvement or worsening status.     ABX: Broad Spectrum Abx w/in 1 hr  Levoquin 750mg IV q48hrs (renal dose) Given one dose [id number], stopped and switched to ceftriaxone 1,000 mg Q24Hr on 11/25  Vanc 750mg IV q12hrs Start 23:00 [id number]  Duration 7-10 days     NUTRITION:   Good nutrition and tight glucose management   No current benefit of IV Vit C  Neutropenia (HCC)      Component      Latest Ref Rng 2/16/2024 3/12/2024 4/5/2024 4/30/2024 5/20/2024   Absolute Neutrophils      1.85 - 7.62 Thousand/uL 6.06  5.47  4.95  5.71  5.87      Component      Latest Ref Rng 5/28/2024 6/3/2024 6/10/2024 6/18/2024 6/22/2024   Absolute Neutrophils      1.85 - 7.62 Thousand/uL 5.89  6.31  5.21  3.42  3.63      Component      Latest Ref Rng 6/23/2024 6/24/2024 7/1/2024 7/2/2024 7/3/2024 7/15/2024   Absolute Neutrophils      1.85 - 7.62 Thousand/uL 2.38  1.58 (L)  1.81 (L)  2.08  1.83 (L)  2.08      Component      Latest Ref Rng 7/29/2024 8/21/2024 8/22/2024 8/23/2024 8/24/2024   Absolute Neutrophils      1.85 - 7.62 Thousand/uL 2.95  6.57  5.54  6.22  4.46      Component      Latest Ref Rng 9/9/2024 9/10/2024 9/11/2024 9/12/2024 9/13/2024   Absolute Neutrophils      1.85 - 7.62 Thousand/uL 5.00  3.22  5.80  4.26  7.84 (H)      Component      Latest Ref Rng 10/3/2024 10/16/2024 10/21/2024 10/22/2024 10/27/2024   Absolute Neutrophils      1.85 - 7.62 Thousand/uL 7.17  1.58 (L)  2.79  4.39  9.78 (H)      Component      Latest Ref Rng 11/11/2024 11/24/2024   Absolute Neutrophils      1.85 - 7.62 Thousand/uL 9.40 (H)  1.54 (L)       Recent Labs     11/24/24  1930   AST 10*   ALT 12   ALKPHOS 41   TBILI 0.27       Fever at home 101.3  Urine culture 11/24: < 10,000 cfu/mL mixed contaminants x2  PLAN:  Broad specturm antibiotics w/ Ceftriaxone and vanc: Consider fungal coverage. Watch for reaction as patient has had rashes with keflex in her hx.   Peripheral smear pending  Consider Consult ID   Oncology consulted  Neutropenic  precautions  Sepsis, unspecified organism (HCC)  Patient reached sepsis criteria AM of 11/26 due to fevers, elevated wbc count, and HR >90. LA ordered and normal. Procal continues to be normal. Will continue to monitor with antibiotics for penumonia, and heparin drip for PE. Will continue to get daily labs.     VTE Pharmacologic Prophylaxis: VTE Score: 9 High Risk (Score >/= 5) - Pharmacological DVT Prophylaxis Ordered: heparin drip. Sequential Compression Devices Ordered.    Mobility:   Basic Mobility Inpatient Raw Score: 24  JH-HLM Goal: 8: Walk 250 feet or more  JH-HLM Achieved: 8: Walk 250 feet ot more  JH-HLM Goal achieved. Continue to encourage appropriate mobility.    Patient Centered Rounds: I performed bedside rounds with nursing staff today.   Discussions with Specialists or Other Care Team Provider: oncology, nursing, attending, case management    Education and Discussions with Family / Patient: Patient declined call to .     Current Length of Stay: 1 day(s)  Current Patient Status: Inpatient   Certification Statement: The patient will continue to require additional inpatient hospital stay due to b/l PE with PNA  Discharge Plan: Anticipate discharge in 48-72 hrs to discharge location to be determined pending rehab evaluations.    Code Status: Level 1 - Full Code    Subjective   Patient seen at bedside this morning. She states she feels a little short of breath but has no chest pain. Slightly decreased appetite and lower in energy as well. Normal urination and bowel movements. She is coughing a little. She has noticed her heart rate is high here and it does not usually run in the 100s. She has had fevers overnight and has been given tylenol.     Objective :  Temp:  [98.2 °F (36.8 °C)-102.7 °F (39.3 °C)] 98.5 °F (36.9 °C)  HR:  [] 96  BP: (118-171)/(71-88) 118/79  Resp:  [16-19] 19  SpO2:  [88 %-96 %] 90 %  O2 Device: None (Room air)    Body mass index is 20.85 kg/m².     Input and  Output Summary (last 24 hours):     Intake/Output Summary (Last 24 hours) at 11/26/2024 1405  Last data filed at 11/26/2024 1238  Gross per 24 hour   Intake 490 ml   Output --   Net 490 ml       Physical Exam  Constitutional:       General: She is not in acute distress.     Appearance: Normal appearance. She is not ill-appearing.   HENT:      Head: Normocephalic and atraumatic.      Right Ear: External ear normal.      Left Ear: External ear normal.      Nose: Nose normal.      Mouth/Throat:      Mouth: Mucous membranes are moist.      Pharynx: Oropharynx is clear.   Eyes:      Conjunctiva/sclera: Conjunctivae normal.   Cardiovascular:      Rate and Rhythm: Regular rhythm. Tachycardia present.      Pulses: Normal pulses.      Heart sounds: Normal heart sounds.   Pulmonary:      Effort: Pulmonary effort is normal.      Breath sounds: No wheezing, rhonchi or rales.      Comments: Diffuse decreased from sounds, patient saturating to 93% on room air  Abdominal:      General: Abdomen is flat. Bowel sounds are normal. There is no distension.      Palpations: Abdomen is soft.      Tenderness: There is no abdominal tenderness.   Musculoskeletal:         General: No swelling.   Skin:     General: Skin is warm and dry.      Capillary Refill: Capillary refill takes less than 2 seconds.   Neurological:      General: No focal deficit present.      Mental Status: She is alert and oriented to person, place, and time. Mental status is at baseline.   Psychiatric:         Mood and Affect: Mood normal.         Behavior: Behavior normal.       Telemetry:  Telemetry Orders (From admission, onward)               24 Hour Telemetry Monitoring  Continuous x 24 Hours (Telem)        Question:  Reason for 24 Hour Telemetry  Answer:  Pulmonary Embolism                     Telemetry Reviewed: Sinus Tachycardia  Indication for Continued Telemetry Use: PE               Lab Results: I have reviewed the following results:   Results from last 7 days    Lab Units 11/26/24  0928   WBC Thousand/uL 3.53*   HEMOGLOBIN g/dL 7.4*   HEMATOCRIT % 23.0*   PLATELETS Thousands/uL 127*   BANDS PCT % 2   LYMPHO PCT % 3*   MONO PCT % 0*   EOS PCT % 0     Results from last 7 days   Lab Units 11/26/24  0515 11/25/24  0732 11/24/24  1930   SODIUM mmol/L 138   < > 139   POTASSIUM mmol/L 4.0   < > 3.8   CHLORIDE mmol/L 104   < > 105   CO2 mmol/L 25   < > 23   BUN mg/dL 15   < > 22   CREATININE mg/dL 0.91   < > 0.99   ANION GAP mmol/L 9   < > 11   CALCIUM mg/dL 9.0   < > 9.1   ALBUMIN g/dL  --   --  3.8   TOTAL BILIRUBIN mg/dL  --   --  0.27   ALK PHOS U/L  --   --  41   ALT U/L  --   --  12   AST U/L  --   --  10*   GLUCOSE RANDOM mg/dL 84   < > 129    < > = values in this interval not displayed.     Results from last 7 days   Lab Units 11/24/24  1930   INR  0.91             Results from last 7 days   Lab Units 11/26/24  0928 11/26/24  0515 11/25/24  0732 11/24/24 2237 11/24/24 2127   LACTIC ACID mmol/L 1.2  --   --  1.4  --    PROCALCITONIN ng/ml  --  0.15 0.10  --  0.12       Recent Cultures (last 7 days):   Results from last 7 days   Lab Units 11/24/24 2233 11/24/24 2127   BLOOD CULTURE  No Growth at 24 hrs.  No Growth at 24 hrs.  --    URINE CULTURE   --  <10,000 cfu/ml       Imaging Results Review: I personally reviewed the following image studies in PACS and associated radiology reports: chest xray, CT chest, CT abdomen/pelvis, and CT head. My interpretation of the radiology images/reports is: see reports.  Other Study Results Review: Other studies reviewed include: ECHO    Last 24 Hours Medication List:     Current Facility-Administered Medications:     acetaminophen (TYLENOL) tablet 650 mg, Q6H KRISS    aluminum-magnesium hydroxide-simethicone (MAALOX) oral suspension 30 mL, Q6H PRN    ceftriaxone (ROCEPHIN) 1 g/50 mL in dextrose IVPB, Q24H, Last Rate: Stopped (11/25/24 7940)    cyanocobalamin (VITAMIN B-12) tablet 1,000 mcg, Daily    dexamethasone (DECADRON) tablet 2  mg, Daily    folic acid (FOLVITE) tablet 1 mg, Daily    gabapentin (NEURONTIN) capsule 100 mg, BID before breakfast/lunch    gabapentin (NEURONTIN) capsule 300 mg, HS    heparin (porcine) 25,000 units in 0.45% NaCl 250 mL infusion (premix), Titrated, Last Rate: 18 Units/kg/hr (11/26/24 1235)    heparin (porcine) injection 2,000 Units, Q6H PRN    heparin (porcine) injection 4,000 Units, Q6H PRN    ibuprofen (MOTRIN) tablet 400 mg, Q6H PRN    lactobacillus acidophilus-bulgaricus (FLORANEX) packet 1 packet, Daily    levETIRAcetam (KEPPRA) tablet 1,000 mg, Q12H KRISS    levothyroxine tablet 50 mcg, Early Morning    LORazepam (ATIVAN) injection 1 mg, Q8H PRN    LORazepam (ATIVAN) injection 2 mg, Q8H PRN    ondansetron (ZOFRAN) injection 4 mg, Q6H PRN    pantoprazole (PROTONIX) EC tablet 40 mg, Early Morning    senna (SENOKOT) tablet 8.6 mg, HS PRN    vancomycin (VANCOCIN) IVPB (premix in dextrose) 1,000 mg 200 mL, Q24H, Last Rate: 1,000 mg (11/26/24 1132)    Facility-Administered Medications Ordered in Other Encounters:     fentaNYL injection, PRN    midazolam (VERSED) injection, PRN    Administrative Statements   Today, Patient Was Seen By: Fabby Monzon, DO  I have spent a total time of 60 minutes in caring for this patient on the day of the visit/encounter including Diagnostic results, Prognosis, Risks and benefits of tx options, Instructions for management, Patient and family education, Importance of tx compliance, Risk factor reductions, Impressions, Counseling / Coordination of care, Documenting in the medical record, Reviewing / ordering tests, medicine, procedures  , Obtaining or reviewing history  , and Communicating with other healthcare professionals .    **Please Note: This note may have been constructed using a voice recognition system.**

## 2024-11-26 NOTE — ASSESSMENT & PLAN NOTE
"  Recent Labs     12/02/24  0329 12/03/24  0559 12/04/24  0514   WBC 4.00* 3.95* 6.49     Recent Labs     12/02/24  0329 12/03/24  0559 12/04/24  0514   CREATININE 0.84 0.75 0.82   EGFR 68 78 70     Estimated Creatinine Clearance: 47.6 mL/min (by C-G formula based on SCr of 0.82 mg/dL).  No results for input(s): \"LACTICACID\" in the last 72 hours.    Recent Labs     12/02/24  0329   PROCALCITONI 0.25     Vitals:    12/04/24 0740   BP: 134/79   Pulse: 86   Resp: 18   Temp: 98.3 °F (36.8 °C)   SpO2: 96%     Temperature: 98.3 °F (36.8 °C)  Temp Source: Oral        Recent Labs     12/01/24  1657   BLOODCX No Growth at 48 hrs.  No Growth at 48 hrs.     CT pe study w abdomen pelvis w contrast  Result Date: 11/25/2024  Right upper lobe opacities consistent with pneumonia 4.  Mild pulmonary edema       DEFINITIONS   SIRS: two of the following that cannot be better explained by another cause  HR:>90/min  and WBC: <4x109/L (<4000/mm3), >12x109/L (>12,000/mm3), or =10% bands  SEPSIS  SIRS AND  Confirmed OR Suspected infection Lungs  qSOFA=0    PLAN:  LABS:   Blood cultures Pending  MRSA Nares Pending  Trend CBC and fever as markers of ongoing infection  Trend procal to help guide D/C of abx    REASSESS DAILY: Reassess pt response to txt daily. Document improvement or worsening status.     ABX: Broad Spectrum Abx w/in 1 hr  Levoquin 750mg IV q48hrs (renal dose) Start 23:00 [id number]  Vanc 750mg IV q12hrs Start 23:00 [id number]  Duration 7-10 days     NUTRITION:   Good nutrition and tight glucose management   No current benefit of IV Vit C                    "

## 2024-11-26 NOTE — DISCHARGE SUMMARY
Discharge Summary - Hospitalist   Name: Ayla Nye 74 y.o. female I MRN: [id number]  Unit/Bed#: S -01 I Date of Admission: 2024   Date of Service: 2024 I Hospital Day: 11     Assessment & Plan     Malignant neoplasm of upper lobe, right bronchus or lung (HCC)  Stage III NSCLC, new onset brain mets 24  Pt is on immunotherapy ketruda and carboplatin last dose    Oncologist Dr. Castro with [facility].    Radiologist Dr. Gamboa with [facility]     OP Pembrolizumab + PEMEtrexed + CARBOplatin Q 21 Days Day 1 Cycle 3 due [id number]  Last dose      PLAN:  Onc consulted and following  - attempted to reach out to Dr. Castro to coordinate care. Her prednisone recs are below under pneumonia  - attempted to reach out to Dr. 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   - on AM of , patient had hemoglobin of 6.6. no coughing up blood, or blood in stool or urine. Only slight bleeding with blowing her nose that she notes. Prepared and gave 1 unit of leukoreduced PRBCs. Repeat H&H afterwards was 9.2.  Transfuse if Hb < 7        Acute pulmonary embolism (HCC)  PERC 2  WELLS 2.5  PESI 104 Class III Intermediate risk   Imagin: CT pe study w abdomen pelvis w contrast  PULMONARY ARTERIAL TREE: Embolism straddles between the middle lobe and right lower lobe basal segment arteries, as seen on series 301/112, this is new from the most recent prior.   Additional embolism noted within the left lower lobe basal  "arteries straddling into the lower lingular segment, as noted on series 301/98, also new from prior.  RV diameter at the level of the AV valve = 3. 9 cm  LV diameter at the level of the AV valve = 2.7 cm  Ratio equals 1.4  CLASSIFICATION Acute hemodynamically stable bilateral right sided segmental left basilar associated with SOB most likely provoked from cancer hx while on immunotherapy   ECHO 11/25: EF 65%, diastolic dysfunction grade I. Mitral valve has mild annular calcification and tricuspid valve has mild regurgitation.      PLAN  Xarelto started 11/28, heparin drip stopped. Price check performed and acceptable for patient. Plan will be this dose for 21 days, then 20 mg daily for 6 months, then 10 mg indefinitely.      Pneumonia           Recent Labs     12/04/24  0514 12/05/24  0638 12/06/24  0506   WBC 6.49 6.85 7.92           Recent Labs     12/04/24  0514 12/05/24  0638   CREATININE 0.82 0.72   EGFR 70 82      Estimated Creatinine Clearance: 54.2 mL/min (by C-G formula based on SCr of 0.72 mg/dL).  No results for input(s): \"LACTICACID\" in the last 72 hours.     No results for input(s): \"PROCALCITONI\" in the last 72 hours.            Vitals:     12/06/24 0034   BP: 164/90   Pulse: 65   Resp:     Temp:     SpO2: 94%      Temperature: (!) 97.3 °F (36.3 °C)  Temp Source: Oral         No results for input(s): \"BLOODCX\", \"SPUTUMCULTUR\", \"GRAMSTAIN\", \"URINECX\", \"WOUNDCULT\", \"BODYFLUIDCUL\", \"MRSACULTURE\", \"INFLUAPCR\", \"INFLUBPCR\", \"RSVPCR\", \"LEGIONELLAUR\", \"STPU\", \"CDIFFTOXINB\" in the last 72 hours.        CT pe study w abdomen pelvis w contrast  Result Date: 11/25/2024  Right upper lobe opacities consistent with pneumonia 4.  Mild pulmonary edema         DEFINITIONS   SIRS: two of the following that cannot be better explained by another cause  HR:>90/min  and WBC: <4x109/L (<4000/mm3), >12x109/L (>12,000/mm3), or =10% bands  SEPSIS  SIRS AND  Confirmed OR Suspected infection Lungs  qSOFA=0  Blood cultures " negative at 72 hours  Procal continues to be negative  MRSA swab negative       PLAN:  LABS:   Trend CBC and fever as markers of ongoing infection  Pneumococcal Antibody 23 ordered, to be followed up outpatient. Ordered prevnar 20 vaccine her for which was administered on 11/27.  Bronchoscopy results so far: Bronchoalveolar lavage: 2% neutrophils. 58% lymphs, 40% macrophage, total count 100, bronchial gram stain 1+ polys, no bacteria seen. AFB culture with stain negative for acid fast bacilli. Fungal cx no growth, virus cx negative at 24 hours, positive for pneumocystis PCR     REASSESS DAILY: Reassess pt response to txt daily. Document improvement or worsening status. Patient had oxygen needs starting on 11/28. Currently on 1 L nasal cannula, an improvement from yesterday where she was on 2 L. Home O2 eval ordered for discharge planning. Does not need home oxygen.   Given tessalon perles PRN for cough.   Repeated chest x-ray 11/29 which showed worsening right upper lobe pneumonia. Consulted pulmonology and had bronchoscopy 11/29. Pending tests: legionella culture, aspergillus antigen, Fungitell  PT/OT ordered for discharge recommendations, no rehab needs on discharge     Treatment medications  Levoquin 750mg IV q48hrs (renal dose) Given one dose [id number], stopped and switched to ceftriaxone 1,000 mg Q24Hr on 11/25. Stopped 11/29.  Vanc 750mg IV q12hrs Started 23:00 [id number]. Stopped on 11/27 due to negative MRSA cx, restarted due to lack of improvement. Finished last dose 12/5 AM.  Current regimen per pulm and ID: Atovaquone 750 mg BID for 21 days started 12/3, then switch to Bactrim 1 double strength tablet MFW as prophylaxis with steroid use. Prednisone 40 mg BID start date 12/3. Plan will be this dose for 5 days, then decreased to 60 mg daily, for 5 more days, then 40 mg daily for 5 days, then switch to 20 mg daily until outpatient follow up with medical oncology. Pulm follow up outpatient as well after  discharge.   Patient can be discharged today with this plan     NUTRITION:   Good nutrition and tight glucose management   No current benefit of IV Vit C  Neutropenia (HCC)        Component      Latest Ref Rng 2/16/2024 3/12/2024 4/5/2024 4/30/2024 5/20/2024   Absolute Neutrophils      1.85 - 7.62 Thousand/uL 6.06  5.47  4.95  5.71  5.87       Component      Latest Ref Rng 5/28/2024 6/3/2024 6/10/2024 6/18/2024 6/22/2024   Absolute Neutrophils      1.85 - 7.62 Thousand/uL 5.89  6.31  5.21  3.42  3.63       Component      Latest Ref Rng 6/23/2024 6/24/2024 7/1/2024 7/2/2024 7/3/2024 7/15/2024   Absolute Neutrophils      1.85 - 7.62 Thousand/uL 2.38  1.58 (L)  1.81 (L)  2.08  1.83 (L)  2.08       Component      Latest Ref Rng 7/29/2024 8/21/2024 8/22/2024 8/23/2024 8/24/2024   Absolute Neutrophils      1.85 - 7.62 Thousand/uL 2.95  6.57  5.54  6.22  4.46       Component      Latest Ref Rng 9/9/2024 9/10/2024 9/11/2024 9/12/2024 9/13/2024   Absolute Neutrophils      1.85 - 7.62 Thousand/uL 5.00  3.22  5.80  4.26  7.84 (H)       Component      Latest Ref Rng 10/3/2024 10/16/2024 10/21/2024 10/22/2024 10/27/2024   Absolute Neutrophils      1.85 - 7.62 Thousand/uL 7.17  1.58 (L)  2.79  4.39  9.78 (H)       Component      Latest Ref Rng 11/11/2024 11/24/2024   Absolute Neutrophils      1.85 - 7.62 Thousand/uL 9.40 (H)  1.54 (L)            Recent Labs     12/04/24  0514 12/05/24  0638   AST 13 12*   ALT 12 11   ALKPHOS 41 36   TBILI 0.26 0.21            Fever at home 101.3  Urine culture 11/24: < 10,000 cfu/mL mixed contaminants x2  PLAN:  Antibiotics: Atovaquone. Vanc completed 12/5  -Oncology consulted  - Pulm consulted: trial of solumedrol 40 mg daily x1, now prednisone as above  -ID consulted  Neutropenic precautions  Sepsis, unspecified organism (HCC)  Patient reached sepsis criteria AM of 11/26 due to fevers, elevated wbc count, and HR >90. LA ordered and normal. Procal continues to be normal. Will continue to monitor  with antibiotics for penumonia, and heparin drip for PE. Will continue to get daily labs.   AM of 11/29: patient no longer septic due to no fevers in the past 24 hours, but still having mild temperature elevations and Hrs in the 90s. Will monitor.   12/6: no more fevers, Hrs 80s-90s, continues to need oxygen via nasal cannula, 1 L.  Acute hypoxic respiratory failure (HCC)  Patient requiring oxygen 1 L via nasal cannula currently. Oxygen needs likely due to the setting of pneumonia and pulmonary embolism. Also had recent bronchoscopy which can increase oxygen needs initially afterwards.   -patient will likely need oxygen on discharge for a few weeks as her lungs recover. Will order home O2 eval determined that she does not require home oxygen.   Medical Problems       Resolved Problems  Date Reviewed: 12/1/2024   None       Discharging Physician / Practitioner: Fabby Monzon DO  PCP: Rafy Angelo MD  Admission Date:   Admission Orders (From admission, onward)       Ordered        11/25/24 0200  INPATIENT ADMISSION  Once                          Discharge Date: 12/06/24    Consultations During Hospital Stay:  Pharmacy, oncology, pulm, ID    Procedures Performed:   none    Significant Findings / Test Results:   CBC: pancytopenia  Wbc 10.56 to 2.26, 3.53, 2.91  CTH 11/24: negative for acute findings  D-dimer 1.57 high  CT PE w/ AP w/ contrast 11/24: b/l PE and RUL PNA  Hemoglobin 6.6 on AM of 11/29, transfused 1 unit PRBCs. Now stable  ECHO 11/25: EF 65%, diastolic dysfunction grade I. Mitral valve has mild annular calcification and tricuspid valve has mild regurgitation.   Procal and lactic acid negative  Blood cultures negative at 5 days  Urine culture <10,000 cfu mixed contaminantsx2  MRSA culture negative  CRP fluctuated up and down between 150s to 170, trending down as low as 75 and then 45.9.  Bronchoalveolar lavage: 2% neutrophils. 58% lymphs, 40% macrophage, total count 100, bronchial gram stain 1+ polys, no  bacteria seen   More bronchoscopy results: AFB culture with stain negative for acid fast bacilli. Fungal cx no growth, virus cx negative at 24 hours, AFB culture w/ stain: no acid fast bacilli seen, positive for pneumocystis PCR   LDH: 296  O2 eval: does not need home O2    Incidental Findings:   None    Test Results Pending at Discharge (will require follow up):   Fungitell  Legionella culture  Aspergillus antigen     Outpatient Tests Requested:  None    Complications:  none    Reason for Admission: Acute PE and PNA    Hospital Course:   Ayla Nye is a 74 y.o. female patient who originally presented to the hospital on 11/24/2024 due to fatigue SOB, and home temp of 101.3. 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 She also had cough. CT head was negative but CT PE w/ AP w/ contrast 11/24 showed b/l PE and RUL PNA. She was given one dose of Levofloxacin and started on heparin drip. She was admitted, placed on telemetry, and oncology was consulted. Patient's antibiotics switched to ceftriaxone and vancomycin, pharmacology was consulted. They performed a bronchoscopy on 11/29 and awaited results. MRSA culture came back negative on 11/27 so vancomycin was stopped and pharm consult discontinued. Eliquis was price checked, and her insurance preferred Xarelto for anticoagulation. This was price checked and patient was okay with the price, she was transitioned to Xarelto from the heparin drip on 11/28. On AM of 11/28, patient was desaturating more to the 80s still had heart rates in the 90s, and was still having nightly fevers, maintaining her sepsis criteria. Nasal cannula oxygen was added due to the desaturations. She ranged between needing 2-4 L. The next morning, 11/29, she was saturating better in the low 90s on room air but had hemoglobin of 6.6. Consent was obtained for blood transfusion  and she was given 1 unit of PRBCs her hemoglobin then stabilized. Patient had several changes to her antibiotics throughout her stay at this point, and ID was consulted for recommendations. Due to lack of improvement, vancomycin was added back on and ceftriaxone was switched to cefepime. Then cefepime was stopped after 4 days as patient was still not improving and it did not seem to be helping. On 12/3, a bronchoscopy result came back positive for pneumocystic pneumonia, so Atovaquone was added. Pulmonology had given her a one time dose of solumedrol ( discontinuing her home decadron) and then switched her to prednisone once informed of this new diagnosis. She finished her last dose of vancomycin on 12/5. The prednisone taper was then perfected per Dr. Castro's recommendations. Official plan will be: Atovaquone for total of 21 days. After this, prophylactic bactrim 1 double strength tablet 3x weekly with steroid use. Prednisone taper: Prednisone 40 mg BID start date 12/3. Plan will be this dose for 5 days, then decreased to 60 mg daily, for 5 more days, then 40 mg daily for 5 days, then switch to 20 mg daily until outpatient follow up with medical oncology. Patient's oxygen needs also started improving and was down to 1 L. Home O2 eval ordered before discharge to potentially set up home oxygen for her. Plan for discharge home, patient has home health aid that checks in on her. Plan will be to follow up with pulm and her outpatient oncologists Dr. Castro and Dr. Gamboa. Appointments are made as 1/29/25, 12/10/25, and 1/2/25 respectively. No ID outpatient follow up needed at this time. Medication regimens for Xarelto, Atovaquone, Bactrim, and Prednisone are above under the plan headings Acute Pulmonary Embolism and Pneumonia respectively. Patient discharged in stable condition on 12/6/24. No oxygen needs.     Please see above list of diagnoses and related plan for additional information.     Condition at Discharge:  "stable    Discharge Day Visit / Exam:   Subjective:  patient seen at bedside this morning. She continues to feel improved. She is ready to go home. All questions regarding medication regimen are answered. Her ride will come around 1:30.   Vitals: Blood Pressure: 144/86 (12/06/24 0724)  Pulse: 74 (12/06/24 0724)  Temperature: (!) 97.3 °F (36.3 °C) (12/06/24 0724)  Temp Source: Oral (12/06/24 0724)  Respirations: 18 (12/06/24 0724)  Height: 5' 2\" (157.5 cm) (11/25/24 0731)  Weight - Scale: 51.7 kg (114 lb) (11/25/24 0731)  SpO2: 91 % (12/06/24 0724)  Physical Exam  Constitutional:       General: She is not in acute distress.     Appearance: Normal appearance. She is not ill-appearing.   HENT:      Head: Normocephalic and atraumatic.      Right Ear: External ear normal.      Left Ear: External ear normal.      Nose: Nose normal.      Comments: Nasal cannula in place     Mouth/Throat:      Mouth: Mucous membranes are moist.      Pharynx: Oropharynx is clear.   Eyes:      Conjunctiva/sclera: Conjunctivae normal.   Cardiovascular:      Rate and Rhythm: Normal rate and regular rhythm.      Pulses: Normal pulses.      Heart sounds: Normal heart sounds.   Pulmonary:      Effort: Pulmonary effort is normal.      Breath sounds: Normal breath sounds.      Comments: Patient on 1 L via nasal cannula saturating at 94%.  Abdominal:      General: Abdomen is flat. Bowel sounds are normal. There is no distension.      Palpations: Abdomen is soft.      Tenderness: There is no abdominal tenderness.   Musculoskeletal:         General: No swelling.   Skin:     General: Skin is warm and dry.      Capillary Refill: Capillary refill takes less than 2 seconds.   Neurological:      General: No focal deficit present.      Mental Status: She is alert and oriented to person, place, and time.   Psychiatric:         Mood and Affect: Mood normal.         Behavior: Behavior normal.        Discussion with Family: Updated  (daughter) via " phone.    Discharge instructions/Information to patient and family:   See after visit summary for information provided to patient and family.      Provisions for Follow-Up Care:  See after visit summary for information related to follow-up care and any pertinent home health orders.      Mobility at time of Discharge:   Basic Mobility Inpatient Raw Score: 24  JH-HLM Goal: 8: Walk 250 feet or more  JH-HLM Achieved: 8: Walk 250 feet ot more  HLM Goal achieved. Continue to encourage appropriate mobility.     Disposition:   Home     Planned Readmission: No    Discharge Medications:  See after visit summary for reconciled discharge medications provided to patient and/or family.      Administrative Statements   Discharge Statement:  I have spent a total time of 30 minutes in caring for this patient on the day of the visit/encounter.    **Please Note: This note may have been constructed using a voice recognition system**

## 2024-11-26 NOTE — PLAN OF CARE
Problem: Potential for Falls  Goal: Patient will remain free of falls  Description: INTERVENTIONS:  - Educate patient/family on patient safety including physical limitations  - Instruct patient to call for assistance with activity   - Consult OT/PT to assist with strengthening/mobility   - Keep Call bell within reach  - Keep bed low and locked with side rails adjusted as appropriate  - Keep care items and personal belongings within reach  - Initiate and maintain comfort rounds  - Make Fall Risk Sign visible to staff  - Offer Toileting every 2 Hours, in advance of need  - Initiate/Maintain bed/chair alarm  - Obtain necessary fall risk management equipment: yellow bracelet/socks  - Apply yellow socks and bracelet for high fall risk patients  - Consider moving patient to room near nurses station  Outcome: Progressing     Problem: INFECTION - ADULT  Goal: Absence or prevention of progression during hospitalization  Description: INTERVENTIONS:  - Assess and monitor for signs and symptoms of infection  - Monitor lab/diagnostic results  - Monitor all insertion sites, i.e. indwelling lines, tubes, and drains  - Monitor endotracheal if appropriate and nasal secretions for changes in amount and color  - [place] appropriate cooling/warming therapies per order  - Administer medications as ordered  - Instruct and encourage patient and family to use good hand hygiene technique  - Identify and instruct in appropriate isolation precautions for identified infection/condition  Outcome: Progressing     Problem: SAFETY ADULT  Goal: Patient will remain free of falls  Description: INTERVENTIONS:  - Educate patient/family on patient safety including physical limitations  - Instruct patient to call for assistance with activity   - Consult OT/PT to assist with strengthening/mobility   - Keep Call bell within reach  - Keep bed low and locked with side rails adjusted as appropriate  - Keep care items and personal belongings within  reach  - Initiate and maintain comfort rounds  - Make Fall Risk Sign visible to staff  - Offer Toileting every 2 Hours, in advance of need  - Initiate/Maintain bed/chair alarm  - Obtain necessary fall risk management equipment: yellow bracelet/socks  - Apply yellow socks and bracelet for high fall risk patients  - Consider moving patient to room near nurses station  Outcome: Progressing     Problem: Knowledge Deficit  Goal: Patient/family/caregiver demonstrates understanding of disease process, treatment plan, medications, and discharge instructions  Description: Complete learning assessment and assess knowledge base.  Interventions:  - Provide teaching at level of understanding  - Provide teaching via preferred learning methods  Outcome: Progressing

## 2024-11-26 NOTE — ASSESSMENT & PLAN NOTE
Component      Latest Ref Rng 2/16/2024 3/12/2024 4/5/2024 4/30/2024 5/20/2024   Absolute Neutrophils      1.85 - 7.62 Thousand/uL 6.06  5.47  4.95  5.71  5.87      Component      Latest Ref Rng 5/28/2024 6/3/2024 6/10/2024 6/18/2024 6/22/2024   Absolute Neutrophils      1.85 - 7.62 Thousand/uL 5.89  6.31  5.21  3.42  3.63      Component      Latest Ref Rng 6/23/2024 6/24/2024 7/1/2024 7/2/2024 7/3/2024 7/15/2024   Absolute Neutrophils      1.85 - 7.62 Thousand/uL 2.38  1.58 (L)  1.81 (L)  2.08  1.83 (L)  2.08      Component      Latest Ref Rng 7/29/2024 8/21/2024 8/22/2024 8/23/2024 8/24/2024   Absolute Neutrophils      1.85 - 7.62 Thousand/uL 2.95  6.57  5.54  6.22  4.46      Component      Latest Ref Rng 9/9/2024 9/10/2024 9/11/2024 9/12/2024 9/13/2024   Absolute Neutrophils      1.85 - 7.62 Thousand/uL 5.00  3.22  5.80  4.26  7.84 (H)      Component      Latest Ref Rng 10/3/2024 10/16/2024 10/21/2024 10/22/2024 10/27/2024   Absolute Neutrophils      1.85 - 7.62 Thousand/uL 7.17  1.58 (L)  2.79  4.39  9.78 (H)      Component      Latest Ref Rng 11/11/2024 11/24/2024   Absolute Neutrophils      1.85 - 7.62 Thousand/uL 9.40 (H)  1.54 (L)       Recent Labs     11/24/24  1930   AST 10*   ALT 12   ALKPHOS 41   TBILI 0.27       Fever at home 101.3  Urine culture 11/24: < 10,000 cfu/mL mixed contaminants x2  PLAN:  Broad specturm antibiotics w/ Ceftriaxone and vanc: Consider fungal coverage. Watch for reaction as patient has had rashes with keflex in her hx.   Peripheral smear pending  Consider Consult ID   Oncology consulted  Neutropenic precautions

## 2024-11-26 NOTE — PROGRESS NOTES
Ayla Nye is a 74 y.o. female who is currently ordered Vancomycin IV with management by the Pharmacy Consult service.  Relevant clinical data and objective / subjective history reviewed.  Vancomycin Assessment:  Indication and Goal AUC/Trough: Pneumonia (goal -600, trough >10)  Clinical Status: stable  Micro:     Renal Function:  SCr: 0.91 mg/dL  CrCl: 42.9 mL/min  Renal replacement: Not on dialysis  Days of Therapy: 2  Current Dose: 1000 mg IV q24h  Vancomycin Plan:  New Dosing: continue current dose  Estimated AUC: 495 mcg*hr/mL  Estimated Trough: 12.4 mcg/mL  Next Level: 12/03 at 0600  Renal Function Monitoring: Daily BMP and UOP  Pharmacy will continue to follow closely for s/sx of nephrotoxicity, infusion reactions and appropriateness of therapy.  BMP and CBC will be ordered per protocol. We will continue to follow the patient’s culture results and clinical progress daily.    Staci Medrano, Pharmacist

## 2024-11-26 NOTE — ASSESSMENT & PLAN NOTE
Patient reached sepsis criteria AM of 11/26 due to fevers, elevated wbc count, and HR >90. LA ordered and normal. Procal continues to be normal. Will continue to monitor with antibiotics for penumonia, and heparin drip for PE. Will continue to get daily labs.

## 2024-11-26 NOTE — ASSESSMENT & PLAN NOTE
PERC 2  WELLS 2.5  PESI 104 Class III Intermediate risk   [id number]: CT pe study w abdomen pelvis w contrast  PULMONARY ARTERIAL TREE: Embolism straddles between the middle lobe and right lower lobe basal segment arteries, as seen on series 301/112, this is new from the most recent prior.   Additional embolism noted within the left lower lobe basal arteries straddling into the lower lingular segment, as noted on series 301/, also new from prior.  RV diameter at the level of the AV valve = 3. 9 cm  LV diameter at the level of the AV valve = 2.7 cm  Ratio equals 1.4  CLASSIFICATION Acute hemodynamically stable bilateral right sided segmental left basilar associated with SOB most likely provoked from cancer hx while on immunotherapy     PLAN  Heparin DRIP   Montior for HIT if happens stop heparin give GIVE direct thrombin inhibitors like argatroban, lepirutidn, vealiruidn, fandaparinaux   ECHO PERT Priority

## 2024-11-27 LAB
ANION GAP SERPL CALCULATED.3IONS-SCNC: 9 MMOL/L (ref 4–13)
APTT PPP: 71 SECONDS (ref 23–34)
BUN SERPL-MCNC: 15 MG/DL (ref 5–25)
CALCIUM SERPL-MCNC: 8.8 MG/DL (ref 8.4–10.2)
CHLORIDE SERPL-SCNC: 103 MMOL/L (ref 96–108)
CO2 SERPL-SCNC: 26 MMOL/L (ref 21–32)
CREAT SERPL-MCNC: 0.89 MG/DL (ref 0.6–1.3)
ERYTHROCYTE [DISTWIDTH] IN BLOOD BY AUTOMATED COUNT: 15.5 % (ref 11.6–15.1)
GFR SERPL CREATININE-BSD FRML MDRD: 64 ML/MIN/1.73SQ M
GLUCOSE SERPL-MCNC: 90 MG/DL (ref 65–140)
HCT VFR BLD AUTO: 23.3 % (ref 34.8–46.1)
HGB BLD-MCNC: 7.3 G/DL (ref 11.5–15.4)
MCH RBC QN AUTO: 32 PG (ref 26.8–34.3)
MCHC RBC AUTO-ENTMCNC: 31.3 G/DL (ref 31.4–37.4)
MCV RBC AUTO: 102 FL (ref 82–98)
PLATELET # BLD AUTO: 168 THOUSANDS/UL (ref 149–390)
PMV BLD AUTO: 9.4 FL (ref 8.9–12.7)
POTASSIUM SERPL-SCNC: 3.6 MMOL/L (ref 3.5–5.3)
RBC # BLD AUTO: 2.28 MILLION/UL (ref 3.81–5.12)
SODIUM SERPL-SCNC: 138 MMOL/L (ref 135–147)
WBC # BLD AUTO: 2.91 THOUSAND/UL (ref 4.31–10.16)

## 2024-11-27 PROCEDURE — 90677 PCV20 VACCINE IM: CPT

## 2024-11-27 PROCEDURE — 80048 BASIC METABOLIC PNL TOTAL CA: CPT

## 2024-11-27 PROCEDURE — 85027 COMPLETE CBC AUTOMATED: CPT

## 2024-11-27 PROCEDURE — G0009 ADMIN PNEUMOCOCCAL VACCINE: HCPCS

## 2024-11-27 PROCEDURE — 99232 SBSQ HOSP IP/OBS MODERATE 35: CPT | Performed by: FAMILY MEDICINE

## 2024-11-27 PROCEDURE — 86317 IMMUNOASSAY INFECTIOUS AGENT: CPT

## 2024-11-27 PROCEDURE — 85730 THROMBOPLASTIN TIME PARTIAL: CPT

## 2024-11-27 RX ADMIN — LEVETIRACETAM 1000 MG: 250 TABLET, FILM COATED ORAL at 21:38

## 2024-11-27 RX ADMIN — ACETAMINOPHEN 650 MG: 325 TABLET, FILM COATED ORAL at 11:30

## 2024-11-27 RX ADMIN — LEVETIRACETAM 1000 MG: 250 TABLET, FILM COATED ORAL at 09:10

## 2024-11-27 RX ADMIN — DEXAMETHASONE 2 MG: 2 TABLET ORAL at 09:11

## 2024-11-27 RX ADMIN — GABAPENTIN 100 MG: 100 CAPSULE ORAL at 06:00

## 2024-11-27 RX ADMIN — CEFTRIAXONE 1000 MG: 2 INJECTION, POWDER, FOR SOLUTION INTRAMUSCULAR; INTRAVENOUS at 13:08

## 2024-11-27 RX ADMIN — PNEUMOCOCCAL 20-VALENT CONJUGATE VACCINE 0.5 ML
2.2; 2.2; 2.2; 2.2; 2.2; 2.2; 2.2; 2.2; 2.2; 2.2; 2.2; 2.2; 2.2; 2.2; 2.2; 2.2; 4.4; 2.2; 2.2; 2.2 INJECTION, SUSPENSION INTRAMUSCULAR at 13:08

## 2024-11-27 RX ADMIN — LEVOTHYROXINE SODIUM 50 MCG: 0.05 TABLET ORAL at 05:57

## 2024-11-27 RX ADMIN — Medication 1 PACKET: at 09:10

## 2024-11-27 RX ADMIN — ACETAMINOPHEN 650 MG: 325 TABLET, FILM COATED ORAL at 05:57

## 2024-11-27 RX ADMIN — FOLIC ACID 1 MG: 1 TABLET ORAL at 09:10

## 2024-11-27 RX ADMIN — PANTOPRAZOLE SODIUM 40 MG: 40 TABLET, DELAYED RELEASE ORAL at 05:57

## 2024-11-27 RX ADMIN — CYANOCOBALAMIN TAB 500 MCG 1000 MCG: 500 TAB at 09:11

## 2024-11-27 RX ADMIN — ACETAMINOPHEN 650 MG: 325 TABLET, FILM COATED ORAL at 23:00

## 2024-11-27 RX ADMIN — GABAPENTIN 100 MG: 100 CAPSULE ORAL at 11:29

## 2024-11-27 RX ADMIN — GABAPENTIN 300 MG: 300 CAPSULE ORAL at 21:38

## 2024-11-27 RX ADMIN — HEPARIN SODIUM 18 UNITS/KG/HR: 10000 INJECTION, SOLUTION INTRAVENOUS at 09:53

## 2024-11-27 RX ADMIN — SENNOSIDES 8.6 MG: 8.6 TABLET, FILM COATED ORAL at 09:11

## 2024-11-27 NOTE — ASSESSMENT & PLAN NOTE
Component      Latest Ref Rng 2/16/2024 3/12/2024 4/5/2024 4/30/2024 5/20/2024   Absolute Neutrophils      1.85 - 7.62 Thousand/uL 6.06  5.47  4.95  5.71  5.87      Component      Latest Ref Rng 5/28/2024 6/3/2024 6/10/2024 6/18/2024 6/22/2024   Absolute Neutrophils      1.85 - 7.62 Thousand/uL 5.89  6.31  5.21  3.42  3.63      Component      Latest Ref Rng 6/23/2024 6/24/2024 7/1/2024 7/2/2024 7/3/2024 7/15/2024   Absolute Neutrophils      1.85 - 7.62 Thousand/uL 2.38  1.58 (L)  1.81 (L)  2.08  1.83 (L)  2.08      Component      Latest Ref Rng 7/29/2024 8/21/2024 8/22/2024 8/23/2024 8/24/2024   Absolute Neutrophils      1.85 - 7.62 Thousand/uL 2.95  6.57  5.54  6.22  4.46      Component      Latest Ref Rng 9/9/2024 9/10/2024 9/11/2024 9/12/2024 9/13/2024   Absolute Neutrophils      1.85 - 7.62 Thousand/uL 5.00  3.22  5.80  4.26  7.84 (H)      Component      Latest Ref Rng 10/3/2024 10/16/2024 10/21/2024 10/22/2024 10/27/2024   Absolute Neutrophils      1.85 - 7.62 Thousand/uL 7.17  1.58 (L)  2.79  4.39  9.78 (H)      Component      Latest Ref Rng 11/11/2024 11/24/2024   Absolute Neutrophils      1.85 - 7.62 Thousand/uL 9.40 (H)  1.54 (L)       Recent Labs     11/24/24  1930   AST 10*   ALT 12   ALKPHOS 41   TBILI 0.27       Fever at home 101.3  Urine culture 11/24: < 10,000 cfu/mL mixed contaminants x2  PLAN:  Antibiotic Ceftriaxone. Vanc stopped 11/27. Watch for reaction as patient has had rashes with keflex in her hx.   Peripheral smear pending  Consider Consult ID   Oncology consulted  Neutropenic precautions

## 2024-11-27 NOTE — PLAN OF CARE
Problem: INFECTION - ADULT  Goal: Absence or prevention of progression during hospitalization  Description: INTERVENTIONS:  - Assess and monitor for signs and symptoms of infection  - Monitor lab/diagnostic results  - Monitor all insertion sites, i.e. indwelling lines, tubes, and drains  - Monitor endotracheal if appropriate and nasal secretions for changes in amount and color  - [place] appropriate cooling/warming therapies per order  - Administer medications as ordered  - Instruct and encourage patient and family to use good hand hygiene technique  - Identify and instruct in appropriate isolation precautions for identified infection/condition  Outcome: Progressing     Problem: SAFETY ADULT  Goal: Patient will remain free of falls  Description: INTERVENTIONS:  - Educate patient/family on patient safety including physical limitations  - Instruct patient to call for assistance with activity   - Consult OT/PT to assist with strengthening/mobility   - Keep Call bell within reach  - Keep bed low and locked with side rails adjusted as appropriate  - Keep care items and personal belongings within reach  - Initiate and maintain comfort rounds  - Make Fall Risk Sign visible to staff  - Apply yellow socks and bracelet for high fall risk patients  - Consider moving patient to room near nurses station  Outcome: Progressing     Problem: Knowledge Deficit  Goal: Patient/family/caregiver demonstrates understanding of disease process, treatment plan, medications, and discharge instructions  Description: Complete learning assessment and assess knowledge base.  Interventions:  - Provide teaching at level of understanding  - Provide teaching via preferred learning methods  Outcome: Progressing

## 2024-11-27 NOTE — ASSESSMENT & PLAN NOTE
Patient reached sepsis criteria AM of 11/26 due to fevers, elevated wbc count, and HR >90. LA ordered and normal. Procal continues to be normal. Will continue to monitor with antibiotics for penumonia, and heparin drip for PE. Will continue to get daily labs.   AM of 11/27: patient borderline still septic due to some HR 91 but others are 88-89, fever overnight.

## 2024-11-27 NOTE — ASSESSMENT & PLAN NOTE
Recent Labs     11/25/24  0522 11/26/24  0928 11/27/24  0436   WBC 2.16* 3.53* 2.91*     Recent Labs     11/25/24  0732 11/26/24  0515 11/27/24  0436   CREATININE 0.81 0.91 0.89   EGFR 71 62 64     Estimated Creatinine Clearance: 43.9 mL/min (by C-G formula based on SCr of 0.89 mg/dL).  Recent Labs     11/24/24 2237 11/26/24  0928   LACTICACID 1.4 1.2     Recent Labs     11/24/24 2127 11/25/24  0732 11/26/24  0515   PROCALCITONI 0.12 0.10 0.15     Vitals:    11/27/24 0735   BP: 119/70   Pulse: 92   Resp: 18   Temp: 99 °F (37.2 °C)   SpO2: 90%     Temperature: 99 °F (37.2 °C)  Temp Source: Oral  Results from last 7 days   Lab Units 11/24/24 2127   LEUKOCYTES UA  Trace*   NITRITE UA  Negative   GLUCOSE UA mg/dl Negative   KETONES UA mg/dl Trace*   BLOOD UA  Small*   WBC UA /hpf 4-10*   RBC UA /hpf 2-4*   BACTERIA UA /hpf None Seen     Recent Labs     11/24/24 2127 11/24/24 2233 11/25/24  1203   BLOODCX  --  No Growth at 48 hrs.  No Growth at 48 hrs.  --    URINECX <10,000 cfu/ml  --   --    MRSACULTURE  --   --  No Methicillin Resistant Staphlyococcus aureus (MRSA) isolated     CT pe study w abdomen pelvis w contrast  Result Date: 11/25/2024  Right upper lobe opacities consistent with pneumonia 4.  Mild pulmonary edema       DEFINITIONS   SIRS: two of the following that cannot be better explained by another cause  HR:>90/min  and WBC: <4x109/L (<4000/mm3), >12x109/L (>12,000/mm3), or =10% bands  SEPSIS  SIRS AND  Confirmed OR Suspected infection Lungs  qSOFA=0  Blood cultures negative at 48 hours  Procal continues to be negative  MRSA swab negative, vanc stopped 11/27    PLAN:  LABS:   Follow Blood cultures  Trend CBC and fever as markers of ongoing infection  Pneumococcal Antibody 23 ordered, to be followed up outpatient. Ordered prevnar 20 vaccine her for today.    REASSESS DAILY: Reassess pt response to txt daily. Document improvement or worsening status.     ABX: Broad Spectrum Abx w/in 1 hr  Levoquin  750mg IV q48hrs (renal dose) Given one dose 24NOV24, stopped and switched to ceftriaxone 1,000 mg Q24Hr on 11/25  Vanc 750mg IV q12hrs Started 23:00 24NOV24. Stopped on 11/27 due to negative MRSA cx  Duration 7-10 days (11/27 is day 4)    NUTRITION:   Good nutrition and tight glucose management   No current benefit of IV Vit C

## 2024-11-27 NOTE — ASSESSMENT & PLAN NOTE
PERC 2  WELLS 2.5  PESI 104 Class III Intermediate risk   [id number]: CT pe study w abdomen pelvis w contrast  PULMONARY ARTERIAL TREE: Embolism straddles between the middle lobe and right lower lobe basal segment arteries, as seen on series 301/112, this is new from the most recent prior.   Additional embolism noted within the left lower lobe basal arteries straddling into the lower lingular segment, as noted on series 301/, also new from prior.  RV diameter at the level of the AV valve = 3. 9 cm  LV diameter at the level of the AV valve = 2.7 cm  Ratio equals 1.4  CLASSIFICATION Acute hemodynamically stable bilateral right sided segmental left basilar associated with SOB most likely provoked from cancer hx while on immunotherapy   ECHO : EF 65%, diastolic dysfunction grade I. Mitral valve has mild annular calcification and tricuspid valve has mild regurgitation.     PLAN  Heparin DRIP   Montior for HIT if happens stop heparin give GIVE direct thrombin inhibitors like argatroban, lepirutidn, vealiruidn, fandaparinaux   Eliquis was price checked, her insurance prefers Xarelto, so will transition her to this medication once she is more stable. Price check performed and acceptable for patient.

## 2024-11-27 NOTE — PROGRESS NOTES
Progress Note - Hospitalist   Name: Ayla Nye 74 y.o. female I MRN: [id number]  Unit/Bed#: S -01 I Date of Admission: 2024   Date of Service: 2024 I Hospital Day: 2    Assessment & Plan  Malignant neoplasm of upper lobe, right bronchus or lung (HCC)  Stage III NSCLC, new onset brain mets 24  Pt is on immunotherapy ketruda and carboplatin last dose    Oncologist Dr. Castro with [facility].    Radiologist Dr. 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)  Currently being treated with dexamethazone and keppra    PLAN  Continue home dexamethasone and keppra  Seizure precautions  Fall precautions  Ativan prn for seizure activity   Anemia    Blood Pressure: 119/70  Recent Labs     24  0522 24  0928 24  0436   HGB 7.3* 7.4* 7.3*   BL 8-11    Plan:  Continue to monitor closely while pt on heparin drip  Transfuse if Hb < 7      Acute pulmonary embolism (HCC)  PERC 2  WELLS 2.5  PESI 104 Class III Intermediate risk   Imagin: CT pe study w abdomen pelvis w contrast  PULMONARY ARTERIAL TREE: Embolism straddles between the middle lobe and right lower lobe basal segment arteries, as seen on series 301/112, this is new from the most recent prior.   Additional embolism noted within the left lower lobe basal arteries straddling into the lower lingular segment, as noted on series 301/98, also new from prior.  RV diameter at the level of the AV valve = 3. 9 cm  LV diameter at the level of the AV valve = 2.7 cm  Ratio equals 1.4  CLASSIFICATION Acute hemodynamically stable bilateral right sided segmental left basilar associated with SOB most likely provoked from cancer hx while on immunotherapy   ECHO : EF 65%, diastolic dysfunction grade I. Mitral valve has mild annular calcification and tricuspid valve has mild  regurgitation.     PLAN  Heparin DRIP   Montior for HIT if happens stop heparin give GIVE direct thrombin inhibitors like argatroban, lepirutidn, vealiruidn, fandaparinaux   Eliquis was price checked, her insurance prefers Xarelto, so will transition her to this medication once she is more stable. Price check performed and acceptable for patient.     Pneumonia    Recent Labs     11/25/24  0522 11/26/24  0928 11/27/24  0436   WBC 2.16* 3.53* 2.91*     Recent Labs     11/25/24  0732 11/26/24  0515 11/27/24  0436   CREATININE 0.81 0.91 0.89   EGFR 71 62 64     Estimated Creatinine Clearance: 43.9 mL/min (by C-G formula based on SCr of 0.89 mg/dL).  Recent Labs     11/24/24 2237 11/26/24  0928   LACTICACID 1.4 1.2     Recent Labs     11/24/24 2127 11/25/24  0732 11/26/24  0515   PROCALCITONI 0.12 0.10 0.15     Vitals:    11/27/24 0735   BP: 119/70   Pulse: 92   Resp: 18   Temp: 99 °F (37.2 °C)   SpO2: 90%     Temperature: 99 °F (37.2 °C)  Temp Source: Oral  Results from last 7 days   Lab Units 11/24/24 2127   LEUKOCYTES UA  Trace*   NITRITE UA  Negative   GLUCOSE UA mg/dl Negative   KETONES UA mg/dl Trace*   BLOOD UA  Small*   WBC UA /hpf 4-10*   RBC UA /hpf 2-4*   BACTERIA UA /hpf None Seen     Recent Labs     11/24/24 2127 11/24/24 2233 11/25/24  1203   BLOODCX  --  No Growth at 48 hrs.  No Growth at 48 hrs.  --    URINECX <10,000 cfu/ml  --   --    MRSACULTURE  --   --  No Methicillin Resistant Staphlyococcus aureus (MRSA) isolated     CT pe study w abdomen pelvis w contrast  Result Date: 11/25/2024  Right upper lobe opacities consistent with pneumonia 4.  Mild pulmonary edema       DEFINITIONS   SIRS: two of the following that cannot be better explained by another cause  HR:>90/min  and WBC: <4x109/L (<4000/mm3), >12x109/L (>12,000/mm3), or =10% bands  SEPSIS  SIRS AND  Confirmed OR Suspected infection Lungs  qSOFA=0  Blood cultures negative at 48 hours  Procal continues to be negative  MRSA swab negative,  vanc stopped 11/27    PLAN:  LABS:   Follow Blood cultures  Trend CBC and fever as markers of ongoing infection  Pneumococcal Antibody 23 ordered, to be followed up outpatient. Ordered prevnar 20 vaccine her for today.    REASSESS DAILY: Reassess pt response to txt daily. Document improvement or worsening status.     ABX: Broad Spectrum Abx w/in 1 hr  Levoquin 750mg IV q48hrs (renal dose) Given one dose 24NOV24, stopped and switched to ceftriaxone 1,000 mg Q24Hr on 11/25  Vanc 750mg IV q12hrs Started 23:00 24NOV24. Stopped on 11/27 due to negative MRSA cx  Duration 7-10 days (11/27 is day 4)    NUTRITION:   Good nutrition and tight glucose management   No current benefit of IV Vit C  Neutropenia (HCC)      Component      Latest Ref Rng 2/16/2024 3/12/2024 4/5/2024 4/30/2024 5/20/2024   Absolute Neutrophils      1.85 - 7.62 Thousand/uL 6.06  5.47  4.95  5.71  5.87      Component      Latest Ref Rng 5/28/2024 6/3/2024 6/10/2024 6/18/2024 6/22/2024   Absolute Neutrophils      1.85 - 7.62 Thousand/uL 5.89  6.31  5.21  3.42  3.63      Component      Latest Ref Rng 6/23/2024 6/24/2024 7/1/2024 7/2/2024 7/3/2024 7/15/2024   Absolute Neutrophils      1.85 - 7.62 Thousand/uL 2.38  1.58 (L)  1.81 (L)  2.08  1.83 (L)  2.08      Component      Latest Ref Rng 7/29/2024 8/21/2024 8/22/2024 8/23/2024 8/24/2024   Absolute Neutrophils      1.85 - 7.62 Thousand/uL 2.95  6.57  5.54  6.22  4.46      Component      Latest Ref Rng 9/9/2024 9/10/2024 9/11/2024 9/12/2024 9/13/2024   Absolute Neutrophils      1.85 - 7.62 Thousand/uL 5.00  3.22  5.80  4.26  7.84 (H)      Component      Latest Ref Rng 10/3/2024 10/16/2024 10/21/2024 10/22/2024 10/27/2024   Absolute Neutrophils      1.85 - 7.62 Thousand/uL 7.17  1.58 (L)  2.79  4.39  9.78 (H)      Component      Latest Ref Rng 11/11/2024 11/24/2024   Absolute Neutrophils      1.85 - 7.62 Thousand/uL 9.40 (H)  1.54 (L)       Recent Labs     11/24/24  1930   AST 10*   ALT 12   ALKPHOS 41   TBILI  0.27       Fever at home 101.3  Urine culture 11/24: < 10,000 cfu/mL mixed contaminants x2  PLAN:  Antibiotic Ceftriaxone. Vanc stopped 11/27. Watch for reaction as patient has had rashes with keflex in her hx.   Peripheral smear pending  Consider Consult ID   Oncology consulted  Neutropenic precautions  Sepsis, unspecified organism (HCC)  Patient reached sepsis criteria AM of 11/26 due to fevers, elevated wbc count, and HR >90. LA ordered and normal. Procal continues to be normal. Will continue to monitor with antibiotics for penumonia, and heparin drip for PE. Will continue to get daily labs.   AM of 11/27: patient borderline still septic due to some HR 91 but others are 88-89, fever overnight.     VTE Pharmacologic Prophylaxis: VTE Score: 9 High Risk (Score >/= 5) - Pharmacological DVT Prophylaxis Ordered: heparin drip. Sequential Compression Devices Ordered.    Mobility:   Basic Mobility Inpatient Raw Score: 24  JH-HLM Goal: 8: Walk 250 feet or more  JH-HLM Achieved: 6: Walk 10 steps or more  JH-HLM Goal achieved. Continue to encourage appropriate mobility.    Patient Centered Rounds: I performed bedside rounds with nursing staff today.   Discussions with Specialists or Other Care Team Provider: oncology, case management, nursing, attending    Education and Discussions with Family / Patient: Updated  (daughter) at bedside.    Current Length of Stay: 2 day(s)  Current Patient Status: Inpatient   Certification Statement: The patient will continue to require additional inpatient hospital stay due to treatment of PNA and PE  Discharge Plan: Anticipate discharge in 48-72 hrs to home.    Code Status: Level 1 - Full Code    Subjective   Patient seen at bedside this morning with daughter present. She states today she feels slightly more short of breath and has more coughing. She does not want to take anything for the coughing. She continues to saturate in the low 90s on room air and her heart rate is 88,  much less tachycardic than yesterday. She did have fever overnight of 100.9 and it went down to 99 with tylenol.     Objective :  Temp:  [97.7 °F (36.5 °C)-100.9 °F (38.3 °C)] 99 °F (37.2 °C)  HR:  [] 92  BP: (103-152)/(66-85) 119/70  Resp:  [15-18] 18  SpO2:  [90 %-96 %] 90 %    Body mass index is 20.85 kg/m².     Input and Output Summary (last 24 hours):     Intake/Output Summary (Last 24 hours) at 11/27/2024 0939  Last data filed at 11/27/2024 0828  Gross per 24 hour   Intake 480 ml   Output --   Net 480 ml       Physical Exam  Constitutional:       General: She is not in acute distress.     Appearance: Normal appearance. She is normal weight. She is not ill-appearing.   HENT:      Head: Normocephalic and atraumatic.      Right Ear: External ear normal.      Left Ear: External ear normal. There is no impacted cerumen.      Nose: Nose normal.      Mouth/Throat:      Mouth: Mucous membranes are moist.      Pharynx: Oropharynx is clear.   Eyes:      Conjunctiva/sclera: Conjunctivae normal.   Cardiovascular:      Rate and Rhythm: Normal rate and regular rhythm.      Pulses: Normal pulses.      Heart sounds: Normal heart sounds.   Pulmonary:      Effort: Pulmonary effort is normal.      Breath sounds: Normal breath sounds.   Abdominal:      General: Abdomen is flat. Bowel sounds are normal. There is no distension.      Palpations: Abdomen is soft.      Tenderness: There is no abdominal tenderness.   Musculoskeletal:         General: No swelling.   Skin:     General: Skin is warm and dry.      Capillary Refill: Capillary refill takes less than 2 seconds.   Neurological:      General: No focal deficit present.      Mental Status: She is alert and oriented to person, place, and time.   Psychiatric:         Mood and Affect: Mood normal.         Behavior: Behavior normal.       Telemetry:  Telemetry Orders (From admission, onward)               24 Hour Telemetry Monitoring  Continuous x 24 Hours (Telem)         Question:  Reason for 24 Hour Telemetry  Answer:  Pulmonary Embolism                     Telemetry Reviewed: Normal Sinus Rhythm  Indication for Continued Telemetry Use: PE               Lab Results: I have reviewed the following results:   Results from last 7 days   Lab Units 11/27/24 0436 11/26/24 0928   WBC Thousand/uL 2.91* 3.53*   HEMOGLOBIN g/dL 7.3* 7.4*   HEMATOCRIT % 23.3* 23.0*   PLATELETS Thousands/uL 168 127*   BANDS PCT %  --  2   LYMPHO PCT %  --  3*   MONO PCT %  --  0*   EOS PCT %  --  0     Results from last 7 days   Lab Units 11/27/24  0436 11/25/24  0732 11/24/24  1930   SODIUM mmol/L 138   < > 139   POTASSIUM mmol/L 3.6   < > 3.8   CHLORIDE mmol/L 103   < > 105   CO2 mmol/L 26   < > 23   BUN mg/dL 15   < > 22   CREATININE mg/dL 0.89   < > 0.99   ANION GAP mmol/L 9   < > 11   CALCIUM mg/dL 8.8   < > 9.1   ALBUMIN g/dL  --   --  3.8   TOTAL BILIRUBIN mg/dL  --   --  0.27   ALK PHOS U/L  --   --  41   ALT U/L  --   --  12   AST U/L  --   --  10*   GLUCOSE RANDOM mg/dL 90   < > 129    < > = values in this interval not displayed.     Results from last 7 days   Lab Units 11/24/24  1930   INR  0.91             Results from last 7 days   Lab Units 11/26/24  0928 11/26/24  0515 11/25/24 0732 11/24/24 2237 11/24/24 2127   LACTIC ACID mmol/L 1.2  --   --  1.4  --    PROCALCITONIN ng/ml  --  0.15 0.10  --  0.12       Recent Cultures (last 7 days):   Results from last 7 days   Lab Units 11/24/24 2233 11/24/24 2127   BLOOD CULTURE  No Growth at 48 hrs.  No Growth at 48 hrs.  --    URINE CULTURE   --  <10,000 cfu/ml       Imaging Results Review: No pertinent imaging studies reviewed.  Other Study Results Review: No additional pertinent studies reviewed.    Last 24 Hours Medication List:     Current Facility-Administered Medications:     acetaminophen (TYLENOL) tablet 650 mg, Q6H KRISS    aluminum-magnesium hydroxide-simethicone (MAALOX) oral suspension 30 mL, Q6H PRN    ceftriaxone (ROCEPHIN) 1 g/50 mL  in dextrose IVPB, Q24H, Last Rate: 1,000 mg (11/26/24 1421)    cyanocobalamin (VITAMIN B-12) tablet 1,000 mcg, Daily    dexamethasone (DECADRON) tablet 2 mg, Daily    folic acid (FOLVITE) tablet 1 mg, Daily    gabapentin (NEURONTIN) capsule 100 mg, BID before breakfast/lunch    gabapentin (NEURONTIN) capsule 300 mg, HS    heparin (porcine) 25,000 units in 0.45% NaCl 250 mL infusion (premix), Titrated, Last Rate: 18 Units/kg/hr (11/26/24 1235)    heparin (porcine) injection 2,000 Units, Q6H PRN    heparin (porcine) injection 4,000 Units, Q6H PRN    ibuprofen (MOTRIN) tablet 400 mg, Q6H PRN    lactobacillus acidophilus-bulgaricus (FLORANEX) packet 1 packet, Daily    levETIRAcetam (KEPPRA) tablet 1,000 mg, Q12H KRISS    levothyroxine tablet 50 mcg, Early Morning    LORazepam (ATIVAN) injection 1 mg, Q8H PRN    LORazepam (ATIVAN) injection 2 mg, Q8H PRN    ondansetron (ZOFRAN) injection 4 mg, Q6H PRN    pantoprazole (PROTONIX) EC tablet 40 mg, Early Morning    senna (SENOKOT) tablet 8.6 mg, HS PRN    Facility-Administered Medications Ordered in Other Encounters:     fentaNYL injection, PRN    midazolam (VERSED) injection, PRN    Administrative Statements   Today, Patient Was Seen By: Fabby Monzon, DO  I have spent a total time of 60 minutes in caring for this patient on the day of the visit/encounter including Diagnostic results, Prognosis, Risks and benefits of tx options, Instructions for management, Patient and family education, Importance of tx compliance, Risk factor reductions, Impressions, Counseling / Coordination of care, Documenting in the medical record, Reviewing / ordering tests, medicine, procedures  , Obtaining or reviewing history  , and Communicating with other healthcare professionals .    **Please Note: This note may have been constructed using a voice recognition system.**

## 2024-11-27 NOTE — ASSESSMENT & PLAN NOTE
Blood Pressure: 119/70  Recent Labs     11/25/24  0522 11/26/24  0928 11/27/24  0436   HGB 7.3* 7.4* 7.3*   BL 8-11    Plan:  Continue to monitor closely while pt on heparin drip  Transfuse if Hb < 7

## 2024-11-27 NOTE — PLAN OF CARE
Problem: Potential for Falls  Goal: Patient will remain free of falls  Description: INTERVENTIONS:  - Educate patient/family on patient safety including physical limitations  - Instruct patient to call for assistance with activity   - Consult OT/PT to assist with strengthening/mobility   - Keep Call bell within reach  - Keep bed low and locked with side rails adjusted as appropriate  - Keep care items and personal belongings within reach  - Initiate and maintain comfort rounds  - Make Fall Risk Sign visible to staff  - Offer Toileting every 2 Hours, in advance of need  - Initiate/Maintain bed/chair alarm  - Obtain necessary fall risk management equipment: yellow bracelet/socks  - Apply yellow socks and bracelet for high fall risk patients  - Consider moving patient to room near nurses station  Outcome: Progressing     Problem: INFECTION - ADULT  Goal: Absence or prevention of progression during hospitalization  Description: INTERVENTIONS:  - Assess and monitor for signs and symptoms of infection  - Monitor lab/diagnostic results  - Monitor all insertion sites, i.e. indwelling lines, tubes, and drains  - Monitor endotracheal if appropriate and nasal secretions for changes in amount and color  - [place] appropriate cooling/warming therapies per order  - Administer medications as ordered  - Instruct and encourage patient and family to use good hand hygiene technique  - Identify and instruct in appropriate isolation precautions for identified infection/condition  Outcome: Progressing     Problem: SAFETY ADULT  Goal: Patient will remain free of falls  Description: INTERVENTIONS:  - Educate patient/family on patient safety including physical limitations  - Instruct patient to call for assistance with activity   - Consult OT/PT to assist with strengthening/mobility   - Keep Call bell within reach  - Keep bed low and locked with side rails adjusted as appropriate  - Keep care items and personal belongings within  reach  - Initiate and maintain comfort rounds  - Make Fall Risk Sign visible to staff  - Offer Toileting every 2 Hours, in advance of need  - Initiate/Maintain bed/chair alarm  - Obtain necessary fall risk management equipment: yellow bracelet/socks  - Apply yellow socks and bracelet for high fall risk patients  - Consider moving patient to room near nurses station  Outcome: Progressing     Problem: Knowledge Deficit  Goal: Patient/family/caregiver demonstrates understanding of disease process, treatment plan, medications, and discharge instructions  Description: Complete learning assessment and assess knowledge base.  Interventions:  - Provide teaching at level of understanding  - Provide teaching via preferred learning methods  Outcome: Progressing

## 2024-11-27 NOTE — CASE MANAGEMENT
Case Management Progress Note    Patient name Ayla Nye  Location S /S -01 MRN [id number]  : 1950 Date 2024       LOS (days): 2  Geometric Mean LOS (GMLOS) (days): 3.8  Days to GMLOS:1.4        OBJECTIVE:        Current admission status: Inpatient  Preferred Pharmacy:   [id number] [place], [place] - [address] [place] [id number] [facility] [address]  Phone: [phone] Fax: [phone]    Primary Care Provider: Rafy Angelo MD    Primary Insurance: MEDICARE  Secondary Insurance: [facility]    PROGRESS NOTE:    CC was contacted by medical provider to inquire about co-pay cost for Xarelto. CC contacted pharmacy and confirmed that for 15mg for Xarelto will be $47.75 and for the 20mg it will be $40. CC provided co-pay to medical provider.

## 2024-11-27 NOTE — NURSING NOTE
Patient daughter, Juliana, called concerned as she sees diagnosis of sepsis in patient chart. RN explained to patient that yesterday was concerning for sepsis due to the fevers and WBC count, however, other blood work was run that can indicate infection and all came back normal. RN explained that patient has not had any fevers since before midnight yesterday. Daughter said she would feel better if blood work that indicates infection is done routinely on a daily basis and daughter does not think patient condition is improving. Family Medicine Night Resident made aware about daughter's concerns and will relay information to day team tomorrow.

## 2024-11-28 ENCOUNTER — RA CDI HCC (OUTPATIENT)
Dept: OTHER | Facility: HOSPITAL | Age: 74
End: 2024-11-28

## 2024-11-28 LAB
ANION GAP SERPL CALCULATED.3IONS-SCNC: 8 MMOL/L (ref 4–13)
APTT PPP: 64 SECONDS (ref 23–34)
BASOPHILS # BLD AUTO: 0.03 THOUSANDS/ΜL (ref 0–0.1)
BASOPHILS NFR BLD AUTO: 1 % (ref 0–1)
BUN SERPL-MCNC: 15 MG/DL (ref 5–25)
CALCIUM SERPL-MCNC: 9 MG/DL (ref 8.4–10.2)
CHLORIDE SERPL-SCNC: 106 MMOL/L (ref 96–108)
CO2 SERPL-SCNC: 25 MMOL/L (ref 21–32)
CREAT SERPL-MCNC: 0.84 MG/DL (ref 0.6–1.3)
EOSINOPHIL # BLD AUTO: 0.05 THOUSAND/ΜL (ref 0–0.61)
EOSINOPHIL NFR BLD AUTO: 2 % (ref 0–6)
ERYTHROCYTE [DISTWIDTH] IN BLOOD BY AUTOMATED COUNT: 15.3 % (ref 11.6–15.1)
GFR SERPL CREATININE-BSD FRML MDRD: 68 ML/MIN/1.73SQ M
GLUCOSE SERPL-MCNC: 89 MG/DL (ref 65–140)
HCT VFR BLD AUTO: 22.6 % (ref 34.8–46.1)
HGB BLD-MCNC: 7.3 G/DL (ref 11.5–15.4)
IMM GRANULOCYTES # BLD AUTO: 0.24 THOUSAND/UL (ref 0–0.2)
IMM GRANULOCYTES NFR BLD AUTO: 8 % (ref 0–2)
LYMPHOCYTES # BLD AUTO: 0.16 THOUSANDS/ΜL (ref 0.6–4.47)
LYMPHOCYTES NFR BLD AUTO: 5 % (ref 14–44)
MCH RBC QN AUTO: 32.4 PG (ref 26.8–34.3)
MCHC RBC AUTO-ENTMCNC: 32.3 G/DL (ref 31.4–37.4)
MCV RBC AUTO: 100 FL (ref 82–98)
MONOCYTES # BLD AUTO: 0.25 THOUSAND/ΜL (ref 0.17–1.22)
MONOCYTES NFR BLD AUTO: 8 % (ref 4–12)
NEUTROPHILS # BLD AUTO: 2.47 THOUSANDS/ΜL (ref 1.85–7.62)
NEUTS SEG NFR BLD AUTO: 76 % (ref 43–75)
NRBC BLD AUTO-RTO: 0 /100 WBCS
PLATELET # BLD AUTO: 190 THOUSANDS/UL (ref 149–390)
PMV BLD AUTO: 9.1 FL (ref 8.9–12.7)
POTASSIUM SERPL-SCNC: 3.8 MMOL/L (ref 3.5–5.3)
RBC # BLD AUTO: 2.25 MILLION/UL (ref 3.81–5.12)
SODIUM SERPL-SCNC: 139 MMOL/L (ref 135–147)
WBC # BLD AUTO: 3.2 THOUSAND/UL (ref 4.31–10.16)

## 2024-11-28 PROCEDURE — 85027 COMPLETE CBC AUTOMATED: CPT

## 2024-11-28 PROCEDURE — 99232 SBSQ HOSP IP/OBS MODERATE 35: CPT | Performed by: FAMILY MEDICINE

## 2024-11-28 PROCEDURE — 85730 THROMBOPLASTIN TIME PARTIAL: CPT | Performed by: FAMILY MEDICINE

## 2024-11-28 PROCEDURE — 80048 BASIC METABOLIC PNL TOTAL CA: CPT

## 2024-11-28 RX ADMIN — LEVOTHYROXINE SODIUM 50 MCG: 0.05 TABLET ORAL at 06:05

## 2024-11-28 RX ADMIN — Medication 1 PACKET: at 08:24

## 2024-11-28 RX ADMIN — LEVETIRACETAM 1000 MG: 250 TABLET, FILM COATED ORAL at 08:23

## 2024-11-28 RX ADMIN — RIVAROXABAN 15 MG: 15 TABLET, FILM COATED ORAL at 17:16

## 2024-11-28 RX ADMIN — DEXAMETHASONE 2 MG: 2 TABLET ORAL at 08:23

## 2024-11-28 RX ADMIN — FOLIC ACID 1 MG: 1 TABLET ORAL at 08:23

## 2024-11-28 RX ADMIN — LEVETIRACETAM 1000 MG: 250 TABLET, FILM COATED ORAL at 22:26

## 2024-11-28 RX ADMIN — GABAPENTIN 100 MG: 100 CAPSULE ORAL at 06:05

## 2024-11-28 RX ADMIN — CYANOCOBALAMIN TAB 500 MCG 1000 MCG: 500 TAB at 08:23

## 2024-11-28 RX ADMIN — GABAPENTIN 300 MG: 300 CAPSULE ORAL at 22:26

## 2024-11-28 RX ADMIN — ACETAMINOPHEN 650 MG: 325 TABLET, FILM COATED ORAL at 17:16

## 2024-11-28 RX ADMIN — PANTOPRAZOLE SODIUM 40 MG: 40 TABLET, DELAYED RELEASE ORAL at 06:05

## 2024-11-28 RX ADMIN — CEFTRIAXONE 1000 MG: 2 INJECTION, POWDER, FOR SOLUTION INTRAMUSCULAR; INTRAVENOUS at 13:00

## 2024-11-28 RX ADMIN — ACETAMINOPHEN 650 MG: 325 TABLET, FILM COATED ORAL at 11:08

## 2024-11-28 RX ADMIN — GABAPENTIN 100 MG: 100 CAPSULE ORAL at 11:08

## 2024-11-28 RX ADMIN — ACETAMINOPHEN 650 MG: 325 TABLET, FILM COATED ORAL at 06:05

## 2024-11-28 NOTE — ASSESSMENT & PLAN NOTE
PERC 2  WELLS 2.5  PESI 104 Class III Intermediate risk   [id number]: CT pe study w abdomen pelvis w contrast  PULMONARY ARTERIAL TREE: Embolism straddles between the middle lobe and right lower lobe basal segment arteries, as seen on series 301/112, this is new from the most recent prior.   Additional embolism noted within the left lower lobe basal arteries straddling into the lower lingular segment, as noted on series 301/, also new from prior.  RV diameter at the level of the AV valve = 3. 9 cm  LV diameter at the level of the AV valve = 2.7 cm  Ratio equals 1.4  CLASSIFICATION Acute hemodynamically stable bilateral right sided segmental left basilar associated with SOB most likely provoked from cancer hx while on immunotherapy   ECHO : EF 65%, diastolic dysfunction grade I. Mitral valve has mild annular calcification and tricuspid valve has mild regurgitation.     PLAN  Xarelto started today , heparin drip stopped. Price check performed and acceptable for patient. Plan will be this dose for 21 days, then 20 mg daily for 6 months, then 10 mg indefinitely.

## 2024-11-28 NOTE — ASSESSMENT & PLAN NOTE
Patient reached sepsis criteria AM of 11/26 due to fevers, elevated wbc count, and HR >90. LA ordered and normal. Procal continues to be normal. Will continue to monitor with antibiotics for penumonia, and heparin drip for PE. Will continue to get daily labs.   AM of 11/28: patient still septic due to some HR 95 and fever overnight of 101.3 requiring Tylenol.    [FreeTextEntry1] : F/U chronic medical problems: DM, Obesity, Elevated cholesterol [de-identified] : Doing well on CGLP-1 antag, loosing weight Tolerating well treatment

## 2024-11-28 NOTE — ASSESSMENT & PLAN NOTE
"    Component      Latest Ref Rn 2/16/2024 3/12/2024 4/5/2024 4/30/2024 5/20/2024   Absolute Neutrophils      1.85 - 7.62 Thousand/uL 6.06  5.47  4.95  5.71  5.87      Component      Latest Ref Rn 5/28/2024 6/3/2024 6/10/2024 6/18/2024 6/22/2024   Absolute Neutrophils      1.85 - 7.62 Thousand/uL 5.89  6.31  5.21  3.42  3.63      Component      Latest Ref Rn 6/23/2024 6/24/2024 7/1/2024 7/2/2024 7/3/2024 7/15/2024   Absolute Neutrophils      1.85 - 7.62 Thousand/uL 2.38  1.58 (L)  1.81 (L)  2.08  1.83 (L)  2.08      Component      Latest Ref Rn 7/29/2024 8/21/2024 8/22/2024 8/23/2024 8/24/2024   Absolute Neutrophils      1.85 - 7.62 Thousand/uL 2.95  6.57  5.54  6.22  4.46      Component      Latest Ref Rn 9/9/2024 9/10/2024 9/11/2024 9/12/2024 9/13/2024   Absolute Neutrophils      1.85 - 7.62 Thousand/uL 5.00  3.22  5.80  4.26  7.84 (H)      Component      Latest Ref Rn 10/3/2024 10/16/2024 10/21/2024 10/22/2024 10/27/2024   Absolute Neutrophils      1.85 - 7.62 Thousand/uL 7.17  1.58 (L)  2.79  4.39  9.78 (H)      Component      Latest Ref Rn 11/11/2024 11/24/2024   Absolute Neutrophils      1.85 - 7.62 Thousand/uL 9.40 (H)  1.54 (L)       No results for input(s): \"AST\", \"ALT\", \"ALKPHOS\", \"TBILI\", \"BILIDIR\" in the last 72 hours.      Fever at home 101.3  Urine culture 11/24: < 10,000 cfu/mL mixed contaminants x2  PLAN:  Antibiotic Ceftriaxone. Vanc stopped 11/27. Watch for reaction as patient has had rashes with keflex in her hx.   Peripheral smear pending  Oncology consulted  Neutropenic precautions  "

## 2024-11-28 NOTE — ASSESSMENT & PLAN NOTE
Recent Labs     11/26/24  0928 11/27/24  0436 11/28/24  0501   WBC 3.53* 2.91* 3.20*     Recent Labs     11/26/24  0515 11/27/24  0436 11/28/24  0501   CREATININE 0.91 0.89 0.84   EGFR 62 64 68     Estimated Creatinine Clearance: 46.5 mL/min (by C-G formula based on SCr of 0.84 mg/dL).  Recent Labs     11/26/24  0928   LACTICACID 1.2     Recent Labs     11/25/24  0732 11/26/24  0515   PROCALCITONI 0.10 0.15     Vitals:    11/28/24 0430   BP:    Pulse: 79   Resp:    Temp: 99.2 °F (37.3 °C)   SpO2: 90%     Temperature: 99.2 °F (37.3 °C)  Temp Source: Oral  Results from last 7 days   Lab Units 11/24/24  2127   LEUKOCYTES UA  Trace*   NITRITE UA  Negative   GLUCOSE UA mg/dl Negative   KETONES UA mg/dl Trace*   BLOOD UA  Small*   WBC UA /hpf 4-10*   RBC UA /hpf 2-4*   BACTERIA UA /hpf None Seen     Recent Labs     11/25/24  1203   MRSACULTURE No Methicillin Resistant Staphlyococcus aureus (MRSA) isolated     CT pe study w abdomen pelvis w contrast  Result Date: 11/25/2024  Right upper lobe opacities consistent with pneumonia 4.  Mild pulmonary edema       DEFINITIONS   SIRS: two of the following that cannot be better explained by another cause  HR:>90/min  and WBC: <4x109/L (<4000/mm3), >12x109/L (>12,000/mm3), or =10% bands  SEPSIS  SIRS AND  Confirmed OR Suspected infection Lungs  qSOFA=0  Blood cultures negative at 72 hours  Procal continues to be negative  MRSA swab negative, vanc stopped 11/27    PLAN:  LABS:   Trend CBC and fever as markers of ongoing infection  Pneumococcal Antibody 23 ordered, to be followed up outpatient. Ordered prevnar 20 vaccine her for which was administered on 11/27.    REASSESS DAILY: Reassess pt response to txt daily. Document improvement or worsening status. Patient had oxygen saturation as low as 86% on room air while awake on 11/28. It went as high as 91% but did not maintain. Started her on nasal cannula oxygen 1 L to improve this to >90%.     ABX: Broad Spectrum Abx w/in 1  hr  Levoquin 750mg IV q48hrs (renal dose) Given one dose 24NOV24, stopped and switched to ceftriaxone 1,000 mg Q24Hr on 11/25  Vanc 750mg IV q12hrs Started 23:00 24NOV24. Stopped on 11/27 due to negative MRSA cx  Duration 7-10 days (11/28 is day 5)    NUTRITION:   Good nutrition and tight glucose management   No current benefit of IV Vit C

## 2024-11-28 NOTE — PROGRESS NOTES
Progress Note - Hospitalist   Name: Ayla Nye 74 y.o. female I MRN: [id number]  Unit/Bed#: S -01 I Date of Admission: 2024   Date of Service: 2024 I Hospital Day: 3    Assessment & Plan  Malignant neoplasm of upper lobe, right bronchus or lung (HCC)  Stage III NSCLC, new onset brain mets 24  Pt is on immunotherapy ketruda and carboplatin last dose    Oncologist Dr. Castro with [facility].    Radiologist Dr. 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)  Currently being treated with dexamethazone and keppra    PLAN  Continue home dexamethasone and keppra  Seizure precautions  Fall precautions  Ativan prn for seizure activity   Anemia    Blood Pressure: 130/71  Recent Labs     24  0928 24  0436 24  0501   HGB 7.4* 7.3* 7.3*   BL 8-11    Plan:  Continue to monitor closely while pt on heparin drip  Transfuse if Hb < 7      Acute pulmonary embolism (HCC)  PERC 2  WELLS 2.5  PESI 104 Class III Intermediate risk   Imagin: CT pe study w abdomen pelvis w contrast  PULMONARY ARTERIAL TREE: Embolism straddles between the middle lobe and right lower lobe basal segment arteries, as seen on series 301/112, this is new from the most recent prior.   Additional embolism noted within the left lower lobe basal arteries straddling into the lower lingular segment, as noted on series 301/98, also new from prior.  RV diameter at the level of the AV valve = 3. 9 cm  LV diameter at the level of the AV valve = 2.7 cm  Ratio equals 1.4  CLASSIFICATION Acute hemodynamically stable bilateral right sided segmental left basilar associated with SOB most likely provoked from cancer hx while on immunotherapy   ECHO : EF 65%, diastolic dysfunction grade I. Mitral valve has mild annular calcification and tricuspid valve has mild  regurgitation.     PLAN  Xarelto started today 11/28, heparin drip stopped. Price check performed and acceptable for patient. Plan will be this dose for 21 days, then 20 mg daily for 6 months, then 10 mg indefinitely.     Pneumonia    Recent Labs     11/26/24  0928 11/27/24  0436 11/28/24  0501   WBC 3.53* 2.91* 3.20*     Recent Labs     11/26/24  0515 11/27/24  0436 11/28/24  0501   CREATININE 0.91 0.89 0.84   EGFR 62 64 68     Estimated Creatinine Clearance: 46.5 mL/min (by C-G formula based on SCr of 0.84 mg/dL).  Recent Labs     11/26/24  0928   LACTICACID 1.2     Recent Labs     11/25/24  0732 11/26/24  0515   PROCALCITONI 0.10 0.15     Vitals:    11/28/24 0430   BP:    Pulse: 79   Resp:    Temp: 99.2 °F (37.3 °C)   SpO2: 90%     Temperature: 99.2 °F (37.3 °C)  Temp Source: Oral  Results from last 7 days   Lab Units 11/24/24 2127   LEUKOCYTES UA  Trace*   NITRITE UA  Negative   GLUCOSE UA mg/dl Negative   KETONES UA mg/dl Trace*   BLOOD UA  Small*   WBC UA /hpf 4-10*   RBC UA /hpf 2-4*   BACTERIA UA /hpf None Seen     Recent Labs     11/25/24  1203   MRSACULTURE No Methicillin Resistant Staphlyococcus aureus (MRSA) isolated     CT pe study w abdomen pelvis w contrast  Result Date: 11/25/2024  Right upper lobe opacities consistent with pneumonia 4.  Mild pulmonary edema       DEFINITIONS   SIRS: two of the following that cannot be better explained by another cause  HR:>90/min  and WBC: <4x109/L (<4000/mm3), >12x109/L (>12,000/mm3), or =10% bands  SEPSIS  SIRS AND  Confirmed OR Suspected infection Lungs  qSOFA=0  Blood cultures negative at 72 hours  Procal continues to be negative  MRSA swab negative, vanc stopped 11/27    PLAN:  LABS:   Trend CBC and fever as markers of ongoing infection  Pneumococcal Antibody 23 ordered, to be followed up outpatient. Ordered prevnar 20 vaccine her for which was administered on 11/27.    REASSESS DAILY: Reassess pt response to txt daily. Document improvement or worsening  "status. Patient had oxygen saturation as low as 86% on room air while awake on 11/28. It went as high as 91% but did not maintain. Started her on nasal cannula oxygen 1 L to improve this to >90%.     ABX: Broad Spectrum Abx w/in 1 hr  Levoquin 750mg IV q48hrs (renal dose) Given one dose 24NOV24, stopped and switched to ceftriaxone 1,000 mg Q24Hr on 11/25  Vanc 750mg IV q12hrs Started 23:00 24NOV24. Stopped on 11/27 due to negative MRSA cx  Duration 7-10 days (11/28 is day 5)    NUTRITION:   Good nutrition and tight glucose management   No current benefit of IV Vit C  Neutropenia (HCC)      Component      Latest Ref Rng 2/16/2024 3/12/2024 4/5/2024 4/30/2024 5/20/2024   Absolute Neutrophils      1.85 - 7.62 Thousand/uL 6.06  5.47  4.95  5.71  5.87      Component      Latest Ref Rng 5/28/2024 6/3/2024 6/10/2024 6/18/2024 6/22/2024   Absolute Neutrophils      1.85 - 7.62 Thousand/uL 5.89  6.31  5.21  3.42  3.63      Component      Latest Ref Rng 6/23/2024 6/24/2024 7/1/2024 7/2/2024 7/3/2024 7/15/2024   Absolute Neutrophils      1.85 - 7.62 Thousand/uL 2.38  1.58 (L)  1.81 (L)  2.08  1.83 (L)  2.08      Component      Latest Ref Rng 7/29/2024 8/21/2024 8/22/2024 8/23/2024 8/24/2024   Absolute Neutrophils      1.85 - 7.62 Thousand/uL 2.95  6.57  5.54  6.22  4.46      Component      Latest Ref Rng 9/9/2024 9/10/2024 9/11/2024 9/12/2024 9/13/2024   Absolute Neutrophils      1.85 - 7.62 Thousand/uL 5.00  3.22  5.80  4.26  7.84 (H)      Component      Latest Ref Rng 10/3/2024 10/16/2024 10/21/2024 10/22/2024 10/27/2024   Absolute Neutrophils      1.85 - 7.62 Thousand/uL 7.17  1.58 (L)  2.79  4.39  9.78 (H)      Component      Latest Ref Rng 11/11/2024 11/24/2024   Absolute Neutrophils      1.85 - 7.62 Thousand/uL 9.40 (H)  1.54 (L)       No results for input(s): \"AST\", \"ALT\", \"ALKPHOS\", \"TBILI\", \"BILIDIR\" in the last 72 hours.      Fever at home 101.3  Urine culture 11/24: < 10,000 cfu/mL mixed contaminants " x2  PLAN:  Antibiotic Ceftriaxone. Vanc stopped 11/27. Watch for reaction as patient has had rashes with keflex in her hx.   Peripheral smear pending  Oncology consulted  Neutropenic precautions  Sepsis, unspecified organism (HCC)  Patient reached sepsis criteria AM of 11/26 due to fevers, elevated wbc count, and HR >90. LA ordered and normal. Procal continues to be normal. Will continue to monitor with antibiotics for penumonia, and heparin drip for PE. Will continue to get daily labs.   AM of 11/28: patient still septic due to some HR 95 and fever overnight of 101.3 requiring Tylenol.     VTE Pharmacologic Prophylaxis: VTE Score: 9 High Risk (Score >/= 5) - Pharmacological DVT Prophylaxis Ordered: heparin drip. Sequential Compression Devices Ordered.    Mobility:   Basic Mobility Inpatient Raw Score: 24  JH-HLM Goal: 8: Walk 250 feet or more  JH-HLM Achieved: 6: Walk 10 steps or more  JH-HLM Goal NOT achieved. Continue with multidisciplinary rounding and encourage appropriate mobility to improve upon JH-HLM goals.    Patient Centered Rounds: I performed bedside rounds with nursing staff today.   Discussions with Specialists or Other Care Team Provider: nursing, attending, case management    Education and Discussions with Family / Patient: Updated  (daughter) via phone.    Current Length of Stay: 3 day(s)  Current Patient Status: Inpatient   Certification Statement: The patient will continue to require additional inpatient hospital stay due to pneumonia and PE  Discharge Plan: Anticipate discharge in 24-48 hrs to home.    Code Status: Level 1 - Full Code    Subjective   Patient seen at bedside this morning. She states her cough and SOB are still apparent. She saturates less today, as low as 86% on room air; she does fluctuate up to 91% but does not stay above 90% throughout conversation. She does note that she does have some light nasal bleeding when she blows her nose each morning with small clots.  She does not necessarily want any nasal spray to help with this, just wanted to make us aware. Not comfortable with going home today.     Objective :  Temp:  [98.8 °F (37.1 °C)-101.3 °F (38.5 °C)] 99.2 °F (37.3 °C)  HR:  [79-93] 79  BP: (119-141)/(69-77) 130/71  Resp:  [15-18] 15  SpO2:  [88 %-95 %] 90 %  O2 Device: None (Room air)    Body mass index is 20.85 kg/m².     Input and Output Summary (last 24 hours):     Intake/Output Summary (Last 24 hours) at 11/28/2024 0635  Last data filed at 11/27/2024 0911  Gross per 24 hour   Intake 420 ml   Output --   Net 420 ml       Physical Exam  Constitutional:       General: She is not in acute distress.     Appearance: Normal appearance. She is not ill-appearing.   HENT:      Head: Normocephalic and atraumatic.      Right Ear: External ear normal.      Left Ear: External ear normal.      Nose: Nose normal.      Mouth/Throat:      Mouth: Mucous membranes are moist.      Pharynx: Oropharynx is clear.   Eyes:      Conjunctiva/sclera: Conjunctivae normal.   Cardiovascular:      Rate and Rhythm: Normal rate and regular rhythm.   Pulmonary:      Effort: Pulmonary effort is normal. No respiratory distress.      Breath sounds: Normal breath sounds.      Comments: On room air, saturating between 86%-91%.   Abdominal:      General: Abdomen is flat. Bowel sounds are normal. There is no distension.      Palpations: Abdomen is soft.      Tenderness: There is no abdominal tenderness.   Musculoskeletal:         General: No swelling.   Skin:     General: Skin is warm and dry.      Capillary Refill: Capillary refill takes less than 2 seconds.   Neurological:      General: No focal deficit present.      Mental Status: She is alert and oriented to person, place, and time. Mental status is at baseline.   Psychiatric:         Mood and Affect: Mood normal.         Behavior: Behavior normal.         Lab Results: I have reviewed the following results:   Results from last 7 days   Lab Units  11/28/24  0501 11/27/24  0436 11/26/24  0928   WBC Thousand/uL 3.20*   < > 3.53*   HEMOGLOBIN g/dL 7.3*   < > 7.4*   HEMATOCRIT % 22.6*   < > 23.0*   PLATELETS Thousands/uL 190   < > 127*   BANDS PCT %  --   --  2   SEGS PCT % 76*  --   --    LYMPHO PCT % 5*  --  3*   MONO PCT % 8  --  0*   EOS PCT % 2  --  0    < > = values in this interval not displayed.     Results from last 7 days   Lab Units 11/28/24  0501 11/25/24  0732 11/24/24  1930   SODIUM mmol/L 139   < > 139   POTASSIUM mmol/L 3.8   < > 3.8   CHLORIDE mmol/L 106   < > 105   CO2 mmol/L 25   < > 23   BUN mg/dL 15   < > 22   CREATININE mg/dL 0.84   < > 0.99   ANION GAP mmol/L 8   < > 11   CALCIUM mg/dL 9.0   < > 9.1   ALBUMIN g/dL  --   --  3.8   TOTAL BILIRUBIN mg/dL  --   --  0.27   ALK PHOS U/L  --   --  41   ALT U/L  --   --  12   AST U/L  --   --  10*   GLUCOSE RANDOM mg/dL 89   < > 129    < > = values in this interval not displayed.     Results from last 7 days   Lab Units 11/24/24  1930   INR  0.91             Results from last 7 days   Lab Units 11/26/24  0928 11/26/24  0515 11/25/24  0732 11/24/24 2237 11/24/24 2127   LACTIC ACID mmol/L 1.2  --   --  1.4  --    PROCALCITONIN ng/ml  --  0.15 0.10  --  0.12       Recent Cultures (last 7 days):   Results from last 7 days   Lab Units 11/24/24 2233 11/24/24 2127   BLOOD CULTURE  No Growth at 48 hrs.  No Growth at 48 hrs.  --    URINE CULTURE   --  <10,000 cfu/ml       Imaging Results Review: No pertinent imaging studies reviewed.  Other Study Results Review: No additional pertinent studies reviewed.    Last 24 Hours Medication List:     Current Facility-Administered Medications:     acetaminophen (TYLENOL) tablet 650 mg, Q6H [facility]    aluminum-magnesium hydroxide-simethicone (MAALOX) oral suspension 30 mL, Q6H PRN    ceftriaxone (ROCEPHIN) 1 g/50 mL in dextrose IVPB, Q24H, Last Rate: 1,000 mg (11/27/24 1308)    cyanocobalamin (VITAMIN B-12) tablet 1,000 mcg, Daily    dexamethasone (DECADRON) tablet 2  mg, Daily    folic acid (FOLVITE) tablet 1 mg, Daily    gabapentin (NEURONTIN) capsule 100 mg, BID before breakfast/lunch    gabapentin (NEURONTIN) capsule 300 mg, HS    heparin (porcine) 25,000 units in 0.45% NaCl 250 mL infusion (premix), Titrated, Last Rate: 18 Units/kg/hr (11/27/24 0953)    heparin (porcine) injection 2,000 Units, Q6H PRN    heparin (porcine) injection 4,000 Units, Q6H PRN    ibuprofen (MOTRIN) tablet 400 mg, Q6H PRN    lactobacillus acidophilus-bulgaricus (FLORANEX) packet 1 packet, Daily    levETIRAcetam (KEPPRA) tablet 1,000 mg, Q12H [facility]    levothyroxine tablet 50 mcg, Early Morning    LORazepam (ATIVAN) injection 1 mg, Q8H PRN    LORazepam (ATIVAN) injection 2 mg, Q8H PRN    ondansetron (ZOFRAN) injection 4 mg, Q6H PRN    pantoprazole (PROTONIX) EC tablet 40 mg, Early Morning    senna (SENOKOT) tablet 8.6 mg, HS PRN    Facility-Administered Medications Ordered in Other Encounters:     fentaNYL injection, PRN    midazolam (VERSED) injection, PRN    Administrative Statements   Today, Patient Was Seen By: Fabby Monzon, DO  I have spent a total time of 60 minutes in caring for this patient on the day of the visit/encounter including Diagnostic results, Prognosis, Risks and benefits of tx options, Instructions for management, Patient and family education, Importance of tx compliance, Risk factor reductions, Impressions, Counseling / Coordination of care, Documenting in the medical record, Reviewing / ordering tests, medicine, procedures  , Obtaining or reviewing history  , and Communicating with other healthcare professionals .    **Please Note: This note may have been constructed using a voice recognition system.**

## 2024-11-28 NOTE — PLAN OF CARE
Problem: Knowledge Deficit  Goal: Patient/family/caregiver demonstrates understanding of disease process, treatment plan, medications, and discharge instructions  Description: Complete learning assessment and assess knowledge base.  Interventions:  - Provide teaching at level of understanding  - Provide teaching via preferred learning methods  Outcome: Progressing     Problem: SAFETY ADULT  Goal: Patient will remain free of falls  Description: INTERVENTIONS:  - Educate patient/family on patient safety including physical limitations  - Instruct patient to call for assistance with activity   - Consult OT/PT to assist with strengthening/mobility   - Keep Call bell within reach  - Keep bed low and locked with side rails adjusted as appropriate  - Keep care items and personal belongings within reach  - Initiate and maintain comfort rounds  - Make Fall Risk Sign visible to staff  - Apply yellow socks and bracelet for high fall risk patients  - Consider moving patient to room near nurses station  Outcome: Progressing

## 2024-11-28 NOTE — ASSESSMENT & PLAN NOTE
Blood Pressure: 130/71  Recent Labs     11/26/24  0928 11/27/24  0436 11/28/24  0501   HGB 7.4* 7.3* 7.3*   BL 8-11    Plan:  Continue to monitor closely while pt on heparin drip  Transfuse if Hb < 7

## 2024-11-29 ENCOUNTER — ANESTHESIA EVENT (INPATIENT)
Dept: GASTROENTEROLOGY | Facility: HOSPITAL | Age: 74
DRG: 871 | End: 2024-11-29
Payer: MEDICARE

## 2024-11-29 ENCOUNTER — APPOINTMENT (INPATIENT)
Dept: GASTROENTEROLOGY | Facility: HOSPITAL | Age: 74
DRG: 871 | End: 2024-11-29
Payer: MEDICARE

## 2024-11-29 ENCOUNTER — ANESTHESIA (INPATIENT)
Dept: GASTROENTEROLOGY | Facility: HOSPITAL | Age: 74
DRG: 871 | End: 2024-11-29
Payer: MEDICARE

## 2024-11-29 ENCOUNTER — APPOINTMENT (INPATIENT)
Dept: RADIOLOGY | Facility: HOSPITAL | Age: 74
DRG: 871 | End: 2024-11-29
Payer: MEDICARE

## 2024-11-29 ENCOUNTER — TELEPHONE (OUTPATIENT)
Dept: NEUROLOGY | Facility: CLINIC | Age: 74
End: 2024-11-29

## 2024-11-29 PROBLEM — J96.01 ACUTE HYPOXIC RESPIRATORY FAILURE (HCC): Status: ACTIVE | Noted: 2024-11-29

## 2024-11-29 LAB
ABO GROUP BLD: NORMAL
ABO GROUP BLD: NORMAL
ANION GAP SERPL CALCULATED.3IONS-SCNC: 9 MMOL/L (ref 4–13)
APTT PPP: 43 SECONDS (ref 23–34)
BASOPHILS # BLD MANUAL: 0 THOUSAND/UL (ref 0–0.1)
BASOPHILS NFR MAR MANUAL: 0 % (ref 0–1)
BLD GP AB SCN SERPL QL: NEGATIVE
BUN SERPL-MCNC: 19 MG/DL (ref 5–25)
CALCIUM SERPL-MCNC: 8.9 MG/DL (ref 8.4–10.2)
CHLORIDE SERPL-SCNC: 106 MMOL/L (ref 96–108)
CO2 SERPL-SCNC: 25 MMOL/L (ref 21–32)
CREAT SERPL-MCNC: 0.84 MG/DL (ref 0.6–1.3)
CRP SERPL QL: 172.7 MG/L
EOSINOPHIL # BLD MANUAL: 0.03 THOUSAND/UL (ref 0–0.4)
EOSINOPHIL NFR BLD MANUAL: 1 % (ref 0–6)
ERYTHROCYTE [DISTWIDTH] IN BLOOD BY AUTOMATED COUNT: 15.2 % (ref 11.6–15.1)
GFR SERPL CREATININE-BSD FRML MDRD: 68 ML/MIN/1.73SQ M
GLUCOSE SERPL-MCNC: 81 MG/DL (ref 65–140)
HCT VFR BLD AUTO: 20.9 % (ref 34.8–46.1)
HCT VFR BLD AUTO: 21.7 % (ref 34.8–46.1)
HCT VFR BLD AUTO: 29.3 % (ref 34.8–46.1)
HGB BLD-MCNC: 6.6 G/DL (ref 11.5–15.4)
HGB BLD-MCNC: 7 G/DL (ref 11.5–15.4)
HGB BLD-MCNC: 9.2 G/DL (ref 11.5–15.4)
HYPERCHROMIA BLD QL SMEAR: PRESENT
LYMPHOCYTES # BLD AUTO: 0.08 THOUSAND/UL (ref 0.6–4.47)
LYMPHOCYTES # BLD AUTO: 3 % (ref 14–44)
LYMPHOCYTES NFR BLD AUTO: 58 %
MACROCYTES BLD QL AUTO: PRESENT
MACROPHAGES NFR FLD: 40 %
MCH RBC QN AUTO: 32.5 PG (ref 26.8–34.3)
MCHC RBC AUTO-ENTMCNC: 31.6 G/DL (ref 31.4–37.4)
MCV RBC AUTO: 103 FL (ref 82–98)
METAMYELOCYTE ABSOLUTE CT: 0.11 THOUSAND/UL (ref 0–0.1)
METAMYELOCYTES NFR BLD MANUAL: 4 % (ref 0–1)
MONOCYTES # BLD AUTO: 0.14 THOUSAND/UL (ref 0–1.22)
MONOCYTES NFR BLD: 5 % (ref 4–12)
NEUTROPHILS # BLD MANUAL: 2.43 THOUSAND/UL (ref 1.85–7.62)
NEUTS BAND NFR BLD MANUAL: 1 % (ref 0–8)
NEUTS SEG NFR BLD AUTO: 2 %
NEUTS SEG NFR BLD AUTO: 86 % (ref 43–75)
NRBC BLD AUTO-RTO: 2 /100 WBC (ref 0–2)
PLATELET # BLD AUTO: 223 THOUSANDS/UL (ref 149–390)
PLATELET BLD QL SMEAR: ADEQUATE
PMV BLD AUTO: 9.5 FL (ref 8.9–12.7)
POLYCHROMASIA BLD QL SMEAR: PRESENT
POTASSIUM SERPL-SCNC: 3.7 MMOL/L (ref 3.5–5.3)
PROCALCITONIN SERPL-MCNC: 0.21 NG/ML
RBC # BLD AUTO: 2.03 MILLION/UL (ref 3.81–5.12)
RBC MORPH BLD: PRESENT
RH BLD: POSITIVE
RH BLD: POSITIVE
SODIUM SERPL-SCNC: 140 MMOL/L (ref 135–147)
SPECIMEN EXPIRATION DATE: NORMAL
TOTAL CELLS COUNTED SPEC: 100
WBC # BLD AUTO: 2.79 THOUSAND/UL (ref 4.31–10.16)

## 2024-11-29 PROCEDURE — 86850 RBC ANTIBODY SCREEN: CPT

## 2024-11-29 PROCEDURE — 87070 CULTURE OTHR SPECIMN AEROBIC: CPT | Performed by: INTERNAL MEDICINE

## 2024-11-29 PROCEDURE — 99223 1ST HOSP IP/OBS HIGH 75: CPT | Performed by: INTERNAL MEDICINE

## 2024-11-29 PROCEDURE — 89051 BODY FLUID CELL COUNT: CPT | Performed by: INTERNAL MEDICINE

## 2024-11-29 PROCEDURE — 71045 X-RAY EXAM CHEST 1 VIEW: CPT

## 2024-11-29 PROCEDURE — 85007 BL SMEAR W/DIFF WBC COUNT: CPT

## 2024-11-29 PROCEDURE — 87252 VIRUS INOCULATION TISSUE: CPT | Performed by: INTERNAL MEDICINE

## 2024-11-29 PROCEDURE — P9016 RBC LEUKOCYTES REDUCED: HCPCS

## 2024-11-29 PROCEDURE — 87116 MYCOBACTERIA CULTURE: CPT | Performed by: INTERNAL MEDICINE

## 2024-11-29 PROCEDURE — 87205 SMEAR GRAM STAIN: CPT | Performed by: INTERNAL MEDICINE

## 2024-11-29 PROCEDURE — 87102 FUNGUS ISOLATION CULTURE: CPT | Performed by: INTERNAL MEDICINE

## 2024-11-29 PROCEDURE — 99232 SBSQ HOSP IP/OBS MODERATE 35: CPT | Performed by: FAMILY MEDICINE

## 2024-11-29 PROCEDURE — 84145 PROCALCITONIN (PCT): CPT

## 2024-11-29 PROCEDURE — 85014 HEMATOCRIT: CPT

## 2024-11-29 PROCEDURE — 80048 BASIC METABOLIC PNL TOTAL CA: CPT

## 2024-11-29 PROCEDURE — 30233N1 TRANSFUSION OF NONAUTOLOGOUS RED BLOOD CELLS INTO PERIPHERAL VEIN, PERCUTANEOUS APPROACH: ICD-10-PCS | Performed by: FAMILY MEDICINE

## 2024-11-29 PROCEDURE — 86900 BLOOD TYPING SEROLOGIC ABO: CPT

## 2024-11-29 PROCEDURE — 86901 BLOOD TYPING SEROLOGIC RH(D): CPT

## 2024-11-29 PROCEDURE — 88112 CYTOPATH CELL ENHANCE TECH: CPT | Performed by: PATHOLOGY

## 2024-11-29 PROCEDURE — 0B9C8ZX DRAINAGE OF RIGHT UPPER LUNG LOBE, VIA NATURAL OR ARTIFICIAL OPENING ENDOSCOPIC, DIAGNOSTIC: ICD-10-PCS | Performed by: INTERNAL MEDICINE

## 2024-11-29 PROCEDURE — 85027 COMPLETE CBC AUTOMATED: CPT

## 2024-11-29 PROCEDURE — 88305 TISSUE EXAM BY PATHOLOGIST: CPT | Performed by: PATHOLOGY

## 2024-11-29 PROCEDURE — 87305 ASPERGILLUS AG IA: CPT | Performed by: INTERNAL MEDICINE

## 2024-11-29 PROCEDURE — 86923 COMPATIBILITY TEST ELECTRIC: CPT

## 2024-11-29 PROCEDURE — 86140 C-REACTIVE PROTEIN: CPT | Performed by: STUDENT IN AN ORGANIZED HEALTH CARE EDUCATION/TRAINING PROGRAM

## 2024-11-29 PROCEDURE — 85018 HEMOGLOBIN: CPT

## 2024-11-29 PROCEDURE — 87798 DETECT AGENT NOS DNA AMP: CPT | Performed by: INTERNAL MEDICINE

## 2024-11-29 PROCEDURE — 87206 SMEAR FLUORESCENT/ACID STAI: CPT | Performed by: INTERNAL MEDICINE

## 2024-11-29 PROCEDURE — 85730 THROMBOPLASTIN TIME PARTIAL: CPT | Performed by: FAMILY MEDICINE

## 2024-11-29 RX ORDER — BENZONATATE 100 MG/1
200 CAPSULE ORAL 3 TIMES DAILY PRN
Status: DISCONTINUED | OUTPATIENT
Start: 2024-11-29 | End: 2024-12-06 | Stop reason: HOSPADM

## 2024-11-29 RX ORDER — PROPOFOL 10 MG/ML
INJECTION, EMULSION INTRAVENOUS AS NEEDED
Status: DISCONTINUED | OUTPATIENT
Start: 2024-11-29 | End: 2024-11-29

## 2024-11-29 RX ORDER — ONDANSETRON 2 MG/ML
4 INJECTION INTRAMUSCULAR; INTRAVENOUS ONCE AS NEEDED
Status: CANCELLED | OUTPATIENT
Start: 2024-11-29

## 2024-11-29 RX ORDER — SODIUM CHLORIDE, SODIUM LACTATE, POTASSIUM CHLORIDE, CALCIUM CHLORIDE 600; 310; 30; 20 MG/100ML; MG/100ML; MG/100ML; MG/100ML
100 INJECTION, SOLUTION INTRAVENOUS CONTINUOUS
Status: DISCONTINUED | OUTPATIENT
Start: 2024-11-29 | End: 2024-12-01

## 2024-11-29 RX ORDER — MIDAZOLAM HYDROCHLORIDE 2 MG/2ML
INJECTION, SOLUTION INTRAMUSCULAR; INTRAVENOUS AS NEEDED
Status: DISCONTINUED | OUTPATIENT
Start: 2024-11-29 | End: 2024-11-29

## 2024-11-29 RX ORDER — FENTANYL CITRATE 50 UG/ML
INJECTION, SOLUTION INTRAMUSCULAR; INTRAVENOUS AS NEEDED
Status: DISCONTINUED | OUTPATIENT
Start: 2024-11-29 | End: 2024-11-29

## 2024-11-29 RX ORDER — PROPOFOL 10 MG/ML
INJECTION, EMULSION INTRAVENOUS CONTINUOUS PRN
Status: DISCONTINUED | OUTPATIENT
Start: 2024-11-29 | End: 2024-11-29

## 2024-11-29 RX ORDER — HYDROMORPHONE HCL IN WATER/PF 6 MG/30 ML
0.2 PATIENT CONTROLLED ANALGESIA SYRINGE INTRAVENOUS
Refills: 0 | Status: CANCELLED | OUTPATIENT
Start: 2024-11-29

## 2024-11-29 RX ADMIN — FENTANYL CITRATE 25 MCG: 50 INJECTION INTRAMUSCULAR; INTRAVENOUS at 15:52

## 2024-11-29 RX ADMIN — RIVAROXABAN 15 MG: 15 TABLET, FILM COATED ORAL at 09:12

## 2024-11-29 RX ADMIN — ACETAMINOPHEN 650 MG: 325 TABLET, FILM COATED ORAL at 11:57

## 2024-11-29 RX ADMIN — SODIUM CHLORIDE, SODIUM LACTATE, POTASSIUM CHLORIDE, AND CALCIUM CHLORIDE: .6; .31; .03; .02 INJECTION, SOLUTION INTRAVENOUS at 15:48

## 2024-11-29 RX ADMIN — CEFEPIME 2000 MG: 2 INJECTION, POWDER, FOR SOLUTION INTRAVENOUS at 18:00

## 2024-11-29 RX ADMIN — PROPOFOL 100 MCG/KG/MIN: 10 INJECTION, EMULSION INTRAVENOUS at 15:53

## 2024-11-29 RX ADMIN — MIDAZOLAM 1 MG: 1 INJECTION INTRAMUSCULAR; INTRAVENOUS at 15:49

## 2024-11-29 RX ADMIN — GABAPENTIN 300 MG: 300 CAPSULE ORAL at 21:52

## 2024-11-29 RX ADMIN — ACETAMINOPHEN 650 MG: 325 TABLET, FILM COATED ORAL at 00:43

## 2024-11-29 RX ADMIN — LEVOTHYROXINE SODIUM 50 MCG: 0.05 TABLET ORAL at 05:41

## 2024-11-29 RX ADMIN — FOLIC ACID 1 MG: 1 TABLET ORAL at 09:12

## 2024-11-29 RX ADMIN — VANCOMYCIN HYDROCHLORIDE 1250 MG: 5 INJECTION, POWDER, LYOPHILIZED, FOR SOLUTION INTRAVENOUS at 19:29

## 2024-11-29 RX ADMIN — PANTOPRAZOLE SODIUM 40 MG: 40 TABLET, DELAYED RELEASE ORAL at 05:41

## 2024-11-29 RX ADMIN — LEVETIRACETAM 1000 MG: 250 TABLET, FILM COATED ORAL at 09:12

## 2024-11-29 RX ADMIN — ACETAMINOPHEN 650 MG: 325 TABLET, FILM COATED ORAL at 23:54

## 2024-11-29 RX ADMIN — GABAPENTIN 100 MG: 100 CAPSULE ORAL at 11:57

## 2024-11-29 RX ADMIN — DEXAMETHASONE 2 MG: 2 TABLET ORAL at 09:12

## 2024-11-29 RX ADMIN — LEVETIRACETAM 1000 MG: 250 TABLET, FILM COATED ORAL at 21:51

## 2024-11-29 RX ADMIN — Medication 1 PACKET: at 09:16

## 2024-11-29 RX ADMIN — CEFTRIAXONE 1000 MG: 2 INJECTION, POWDER, FOR SOLUTION INTRAMUSCULAR; INTRAVENOUS at 16:35

## 2024-11-29 RX ADMIN — ACETAMINOPHEN 650 MG: 325 TABLET, FILM COATED ORAL at 05:41

## 2024-11-29 RX ADMIN — CYANOCOBALAMIN TAB 500 MCG 1000 MCG: 500 TAB at 09:12

## 2024-11-29 RX ADMIN — PROPOFOL 50 MG: 10 INJECTION, EMULSION INTRAVENOUS at 15:52

## 2024-11-29 RX ADMIN — SODIUM CHLORIDE, SODIUM LACTATE, POTASSIUM CHLORIDE, AND CALCIUM CHLORIDE 100 ML/HR: .6; .31; .03; .02 INJECTION, SOLUTION INTRAVENOUS at 19:27

## 2024-11-29 RX ADMIN — GABAPENTIN 100 MG: 100 CAPSULE ORAL at 09:12

## 2024-11-29 RX ADMIN — RIVAROXABAN 15 MG: 15 TABLET, FILM COATED ORAL at 17:58

## 2024-11-29 RX ADMIN — ACETAMINOPHEN 650 MG: 325 TABLET, FILM COATED ORAL at 17:58

## 2024-11-29 NOTE — ASSESSMENT & PLAN NOTE
Likely provoked in setting of known adeno-NSCLC with brain metastases  Continue Xarelto, will likely need lifelong given metastatic cancer

## 2024-11-29 NOTE — CONSULTS
Consultation - Pulmonology   Name: Ayla Nye 74 y.o. female I MRN: [id number]  Unit/Bed#: S -01 I Date of Admission: 11/24/2024   Date of Service: 11/29/2024 I Hospital Day: 4       Inpatient consult to Pulmonology     Date/Time  11/29/2024 10:36 AM     Performed by  Eliseo Small MD   Authorized by  Patsy Campbell MD           Physician Requesting Evaluation: Manisha Reinoso DO   Reason for Evaluation / Principal Problem: Worsening hypoxia in setting of PE    Assessment & Plan  Acute hypoxic respiratory failure (HCC)  Likely setting to worsening right upper lobe infiltrates, less likely progression of PE  CXR 11/29/2024 with increased haziness of right upper lung compared to admit CXR, CT chest on admit showed right upper lobe consolidations that appear to be consistent with pneumonia  Has been on Rocephin since admission but appears to be worsening  -will try to get Bronchoscopy today, continue supplemental oxygen for goal sats greater than 90%  Pneumonia  Noted initially on admit CXR and CT chest, MRSA swab negative  Has been on Rocephin with continued fevers and CXR on 11/29 with worsening RUL infiltrates  Tentative plan for bronchoscopy today and will escalate antibiotics after  Acute pulmonary embolism (HCC)  Likely provoked in setting of known adeno-NSCLC with brain metastases  Continue Xarelto, will likely need lifelong given metastatic cancer  Malignant neoplasm of upper lobe, right bronchus or lung (HCC)  Follows with Dr. Penn, currently on cycle 2 of pemetrexed/carboplatin/pembrolizumab with last infusion 11/14/2024  Neutropenia (HCC)  Insetting of chemotherapy, may be masking typical immune response explaining negative procalcitonin and low WBC    History of Present Illness   Ayla Nye is a 74 y.o. female who presented to the hospital with shortness of breath and was subsequently found to have bilateral segmental PE as well as right upper lobe pneumonia.  She was treated  with heparin and Rocephin/vancomycin.  Her MRSA probe was negative and vancomycin was discontinued.  She was transitioned off heparin to Xarelto.  On 11/29 she developed hypoxia and repeat CXR showed worsening right upper lobe infiltrates.  She was also requiring 2 L nasal cannula which was new for the admission.    Now with patient at bedside and she is feeling okay today but still having shortness of breath and a worsening cough.  She denies any chest pain or tightness.  She has not been having any hemoptysis.  She has been having nightly fevers despite the Rocephin.  She is currently undergoing treatment for her cancer and last treatment was 11/14/2024 with pemetrexed/carbo/pembrolizumab.    Discussed that given her lack of improvement with Rocephin there is risk that she has an atypical infection and would benefit from bronchoscopy to better isolate the bacteria.  She was agreeable to this plan.  Discussed with anesthesiology about timing of procedure as she did a breakfast at 7 AM.  Will plan for bronchoscopy later today with cultures.  Given her continued fevers and lack improvement on Rocephin after we do obtain cultures will plan to broaden antibiotics to Zosyn.    Review of Systems   Constitutional:  Positive for chills and fever. Negative for appetite change.   HENT:  Negative for congestion and rhinorrhea.    Respiratory:  Positive for cough and shortness of breath. Negative for chest tightness.    Cardiovascular:  Negative for chest pain and palpitations.   Gastrointestinal:  Negative for nausea and vomiting.   Musculoskeletal:  Negative for neck pain and neck stiffness.   Skin:  Negative for rash.       Historical Information   I have reviewed the patient's PMH, PSH, Social History, Family History, Meds, and Allergies  Tobacco History: Former smoker, with approximately 55-pack-year history  Occupational History: Noncontributory  Family History:non-contributory    Objective :  Temp:  [98.5 °F (36.9  °C)-99 °F (37.2 °C)] 98.6 °F (37 °C)  HR:  [82-91] 91  BP: (116-151)/(72-81) 116/72  Resp:  [18-19] 18  SpO2:  [91 %-99 %] 96 %  O2 Device: Nasal cannula  Nasal Cannula O2 Flow Rate (L/min):  [2 L/min] 2 L/min    Physical Exam  Vitals reviewed.   Constitutional:       General: She is not in acute distress.     Appearance: She is not toxic-appearing.   HENT:      Mouth/Throat:      Mouth: Mucous membranes are moist.      Pharynx: Oropharynx is clear.   Cardiovascular:      Rate and Rhythm: Normal rate and regular rhythm.   Pulmonary:      Effort: Pulmonary effort is normal. No respiratory distress.      Breath sounds: No wheezing.      Comments: Coarse breath sounds with some crackles in right upper lobe, otherwise clear breath sounds without wheezing or crackles  Abdominal:      Palpations: Abdomen is soft.      Tenderness: There is no abdominal tenderness.   Musculoskeletal:      Right lower leg: No edema.      Left lower leg: No edema.   Neurological:      General: No focal deficit present.      Mental Status: She is alert and oriented to person, place, and time.           Lab Results: I have reviewed the following results:  .     11/29/24  0446 11/29/24  0809 11/29/24  0851   WBC 2.79*  --   --    HGB 6.6*  --  7.0*   HCT 20.9*  --  21.7*     --   --    BANDSPCT 1  --   --    SODIUM 140  --   --    K 3.7  --   --      --   --    CO2 25  --   --    BUN 19  --   --    CREATININE 0.84  --   --    GLUC 81  --   --    PTT  --  43*  --      ABG: No new results in last 24 hours.    Imaging Results Review: I personally reviewed the following image studies in PACS and associated radiology reports: CT chest. My interpretation of the radiology images/reports is: On admit with bilateral subsegmental PEs as well as right upper lobe groundglass infiltrates and stable masses, CT chest in 10/2024 showed clear right upper lung fields with stable masses.  Other Study Results Review: Other studies reviewed include: TTE  showing LVEF 65% with mildly abnormal diastolic dysfunction, RVSP 31 with normal RV function and size  PFT Results Reviewed: reviewed from 3/12/2024 with mildly reduced DLCO, mild obstruction    VTE Prophylaxis: VTE covered by:  rivaroxaban, Oral, 15 mg at 11/29/24 0912      Eliseo Small M.D.  Pulmonary & Critical Care Fellow, PGY-IV

## 2024-11-29 NOTE — ANESTHESIA POSTPROCEDURE EVALUATION
Post-Op Assessment Note    CV Status:  Stable  Pain Score: 0    Pain management: adequate       Mental Status:  Alert and awake   Hydration Status:  Euvolemic   PONV Controlled:  Controlled   Airway Patency:  Patent     Post Op Vitals Reviewed: Yes    No anethesia notable event occurred.    Staff: Anesthesiologist, CRNA           Last Filed PACU Vitals:  Vitals Value Taken Time   Temp 97.5 °F (36.4 °C) 11/29/24 1612   Pulse 84 11/29/24 1612   /74 11/29/24 1612   Resp 18 11/29/24 1612   SpO2 97 % 11/29/24 1612       Modified Nita:  Activity: 2 (11/29/2024  4:12 PM)  Respiration: 2 (11/29/2024  4:12 PM)  Circulation: 2 (11/29/2024  4:12 PM)  Consciousness: 1 (11/29/2024  4:12 PM)  Oxygen Saturation: 1 (11/29/2024  4:12 PM)  Modified Nita Score: 8 (11/29/2024  4:12 PM)

## 2024-11-29 NOTE — ANESTHESIA PREPROCEDURE EVALUATION
Procedure:  BRONCHOSCOPY    Relevant Problems   CARDIO   (+) Acute pulmonary embolism (HCC)   (+) Hypertension   (+) Mixed hyperlipidemia      ENDO   (+) Hypothyroidism      HEMATOLOGY   (+) Anemia      MUSCULOSKELETAL   (+) Psoriatic arthritis (HCC)      NEURO/PSYCH   (+) Visual disturbance      PULMONARY   (+) Acute hypoxic respiratory failure (HCC)   (+) Pneumonia      Behavioral Health   (+) Altered mental status      Neurology/Sleep   (+) Acute metabolic encephalopathy   (+) Brain mass      Blood   (+) Neutropenia (HCC)      Care Coordination   (+) Palliative care patient      Oncology   (+) Essential thrombocytosis (HCC)   (+) Malignant neoplasm metastatic to brain (HCC)   (+) 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      Anesthesia Plan  ASA Score- 4     Anesthesia Type- IV sedation with anesthesia with ASA Monitors.         Additional Monitors:     Airway Plan:            Plan Factors-Exercise tolerance (METS): <4 METS.    Chart reviewed. EKG reviewed. Imaging results reviewed. Existing labs reviewed. Patient summary reviewed.    Patient is not a current smoker.  Patient did not smoke on day of surgery.            Induction- intravenous.    Postoperative Plan-     Perioperative Resuscitation Plan - Level 1 - Full Code.       Informed Consent- Anesthetic plan and risks discussed with patient.  I personally reviewed this patient with the CRNA. Discussed and agreed on the Anesthesia Plan with the CRNA..

## 2024-11-29 NOTE — ASSESSMENT & PLAN NOTE
PERC 2  WELLS 2.5  PESI 104 Class III Intermediate risk   [id number]: CT pe study w abdomen pelvis w contrast  PULMONARY ARTERIAL TREE: Embolism straddles between the middle lobe and right lower lobe basal segment arteries, as seen on series 301/112, this is new from the most recent prior.   Additional embolism noted within the left lower lobe basal arteries straddling into the lower lingular segment, as noted on series 301/, also new from prior.  RV diameter at the level of the AV valve = 3. 9 cm  LV diameter at the level of the AV valve = 2.7 cm  Ratio equals 1.4  CLASSIFICATION Acute hemodynamically stable bilateral right sided segmental left basilar associated with SOB most likely provoked from cancer hx while on immunotherapy   ECHO : EF 65%, diastolic dysfunction grade I. Mitral valve has mild annular calcification and tricuspid valve has mild regurgitation.     PLAN  Xarelto started , heparin drip stopped. Price check performed and acceptable for patient. Plan will be this dose for 21 days, then 20 mg daily for 6 months, then 10 mg indefinitely.

## 2024-11-29 NOTE — RESPIRATORY THERAPY NOTE
Assisted with Bronch. Went through nares. Used 8 ML  1% lido total on cords and ramin. BAL done  ML saline pushed and 70 ML pulled back. All samples labeled and walked to lab. Patient tolerated well.

## 2024-11-29 NOTE — ASSESSMENT & PLAN NOTE
Likely setting to worsening right upper lobe infiltrates, less likely progression of PE  CXR 11/29/2024 with increased haziness of right upper lung compared to admit CXR, CT chest on admit showed right upper lobe consolidations that appear to be consistent with pneumonia  Has been on Rocephin since admission but appears to be worsening  -will try to get Bronchoscopy today, continue supplemental oxygen for goal sats greater than 90%

## 2024-11-29 NOTE — PROGRESS NOTES
Progress Note - Hospitalist   Name: Ayla Nye 74 y.o. female I MRN: [id number]  Unit/Bed#: S -01 I Date of Admission: 2024   Date of Service: 2024 I Hospital Day: 4    Assessment & Plan  Malignant neoplasm of upper lobe, right bronchus or lung (HCC)  Stage III NSCLC, new onset brain mets 24  Pt is on immunotherapy ketruda and carboplatin last dose    Oncologist Dr. Castro with [facility].    Radiologist Dr. 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   - on AM of , patient had hemoglobin of 6.6. no coughing up blood, or blood in stool or urine. Only slight bleeding with blowing her nose that she notes. Prepared and gave 1 unit of leukoreduced PRBCs. Repeat H&H at 4 pm.  Transfuse if Hb < 7      Acute pulmonary embolism (HCC)  PERC 2  WELLS 2.5  PESI 104 Class III Intermediate risk   Imagin: CT pe study w abdomen pelvis w contrast  PULMONARY ARTERIAL TREE: Embolism straddles between the middle lobe and right lower lobe basal segment arteries, as seen on series 301/112, this is new from the most recent prior.   Additional embolism noted within the left lower lobe basal arteries straddling into the lower lingular segment, as noted on series 301/, also new from prior.  RV diameter at the level of the AV valve = 3. 9 cm  LV diameter at the level of the AV valve = 2.7 cm  Ratio equals 1.4  CLASSIFICATION Acute hemodynamically stable bilateral right sided segmental left basilar associated  "with SOB most likely provoked from cancer hx while on immunotherapy   ECHO 11/25: EF 65%, diastolic dysfunction grade I. Mitral valve has mild annular calcification and tricuspid valve has mild regurgitation.     PLAN  Xarelto started 11/28, heparin drip stopped. Price check performed and acceptable for patient. Plan will be this dose for 21 days, then 20 mg daily for 6 months, then 10 mg indefinitely.     Pneumonia    Recent Labs     11/27/24  0436 11/28/24  0501 11/29/24  0446   WBC 2.91* 3.20* 2.79*     Recent Labs     11/27/24  0436 11/28/24  0501 11/29/24  0446   CREATININE 0.89 0.84 0.84   EGFR 64 68 68     Estimated Creatinine Clearance: 46.5 mL/min (by C-G formula based on SCr of 0.84 mg/dL).  Recent Labs     11/26/24  0928   LACTICACID 1.2     Recent Labs     11/29/24  0436   PROCALCITONI 0.21       Vitals:    11/29/24 0655   BP: 116/72   Pulse: 91   Resp: 18   Temp: 98.6 °F (37 °C)   SpO2: 99%     Temperature: 98.6 °F (37 °C)  Temp Source: Oral  Results from last 7 days   Lab Units 11/24/24 2127   LEUKOCYTES UA  Trace*   NITRITE UA  Negative   GLUCOSE UA mg/dl Negative   KETONES UA mg/dl Trace*   BLOOD UA  Small*   WBC UA /hpf 4-10*   RBC UA /hpf 2-4*   BACTERIA UA /hpf None Seen     No results for input(s): \"BLOODCX\", \"SPUTUMCULTUR\", \"GRAMSTAIN\", \"URINECX\", \"WOUNDCULT\", \"BODYFLUIDCUL\", \"MRSACULTURE\", \"INFLUAPCR\", \"INFLUBPCR\", \"RSVPCR\", \"LEGIONELLAUR\", \"STPU\", \"CDIFFTOXINB\" in the last 72 hours.    CT pe study w abdomen pelvis w contrast  Result Date: 11/25/2024  Right upper lobe opacities consistent with pneumonia 4.  Mild pulmonary edema       DEFINITIONS   SIRS: two of the following that cannot be better explained by another cause  HR:>90/min  and WBC: <4x109/L (<4000/mm3), >12x109/L (>12,000/mm3), or =10% bands  SEPSIS  SIRS AND  Confirmed OR Suspected infection Lungs  qSOFA=0  Blood cultures negative at 72 hours  Procal continues to be negative  MRSA swab negative, vanc stopped 11/27    PLAN:  LABS: "   Trend CBC and fever as markers of ongoing infection  Pneumococcal Antibody 23 ordered, to be followed up outpatient. Ordered prevnar 20 vaccine her for which was administered on 11/27.    REASSESS DAILY: Reassess pt response to txt daily. Document improvement or worsening status. Patient had oxygen saturation as low as 86% on room air while awake on 11/28. It went as high as 91% but did not maintain. Ordered her nasal cannula oxygen to improve this to >90%, but it was never given. 11/29 patient saturating to low 90s on room air. Oxygen added today 11/29.   Given tessalon perles PRN for cough.   Repeated chest x-ray today 11/29 which showed worsening right upper lobe pneumonia. Consulted pulmonology and plan for bronchoscopy today. Patient NPO.     ABX  Levoquin 750mg IV q48hrs (renal dose) Given one dose 24NOV24, stopped and switched to ceftriaxone 1,000 mg Q24Hr on 11/25  Vanc 750mg IV q12hrs Started 23:00 24NOV24. Stopped on 11/27 due to negative MRSA cx  Duration 7-10 days (11/29 is day 6)    NUTRITION:   Good nutrition and tight glucose management   No current benefit of IV Vit C  Neutropenia (HCC)      Component      Latest Ref Rng 2/16/2024 3/12/2024 4/5/2024 4/30/2024 5/20/2024   Absolute Neutrophils      1.85 - 7.62 Thousand/uL 6.06  5.47  4.95  5.71  5.87      Component      Latest Ref Rng 5/28/2024 6/3/2024 6/10/2024 6/18/2024 6/22/2024   Absolute Neutrophils      1.85 - 7.62 Thousand/uL 5.89  6.31  5.21  3.42  3.63      Component      Latest Ref Rng 6/23/2024 6/24/2024 7/1/2024 7/2/2024 7/3/2024 7/15/2024   Absolute Neutrophils      1.85 - 7.62 Thousand/uL 2.38  1.58 (L)  1.81 (L)  2.08  1.83 (L)  2.08      Component      Latest Ref Rng 7/29/2024 8/21/2024 8/22/2024 8/23/2024 8/24/2024   Absolute Neutrophils      1.85 - 7.62 Thousand/uL 2.95  6.57  5.54  6.22  4.46      Component      Latest Ref Rng 9/9/2024 9/10/2024 9/11/2024 9/12/2024 9/13/2024   Absolute Neutrophils      1.85 - 7.62 Thousand/uL 5.00  " 3.22  5.80  4.26  7.84 (H)      Component      Latest Ref Rng 10/3/2024 10/16/2024 10/21/2024 10/22/2024 10/27/2024   Absolute Neutrophils      1.85 - 7.62 Thousand/uL 7.17  1.58 (L)  2.79  4.39  9.78 (H)      Component      Latest Ref Rng 11/11/2024 11/24/2024   Absolute Neutrophils      1.85 - 7.62 Thousand/uL 9.40 (H)  1.54 (L)       No results for input(s): \"AST\", \"ALT\", \"ALKPHOS\", \"TBILI\", \"BILIDIR\" in the last 72 hours.      Fever at home 101.3  Urine culture 11/24: < 10,000 cfu/mL mixed contaminants x2  PLAN:  Antibiotic Ceftriaxone. Vanc stopped 11/27. Watch for reaction as patient has had rashes with keflex in her hx.   Peripheral smear pending  Oncology consulted  Neutropenic precautions  Sepsis, unspecified organism (HCC)  Patient reached sepsis criteria AM of 11/26 due to fevers, elevated wbc count, and HR >90. LA ordered and normal. Procal continues to be normal. Will continue to monitor with antibiotics for penumonia, and heparin drip for PE. Will continue to get daily labs.   AM of 11/29: patient no longer septic due to no fevers in the past 24 hours, but still having mild temperature elevations and Hrs in the 90s. Will monitor.     VTE Pharmacologic Prophylaxis: VTE Score: 9 High Risk (Score >/= 5) - Pharmacological DVT Prophylaxis Ordered: rivaroxaban (Xarelto). Sequential Compression Devices Ordered.    Mobility:   Basic Mobility Inpatient Raw Score: 24  JH-HLM Goal: 8: Walk 250 feet or more  JH-HLM Achieved: 7: Walk 25 feet or more  JH-HLM Goal NOT achieved. Continue with multidisciplinary rounding and encourage appropriate mobility to improve upon JH-HLM goals.    Patient Centered Rounds: I performed bedside rounds with nursing staff today.   Discussions with Specialists or Other Care Team Provider: nursing, attending, case management    Education and Discussions with Family / Patient: Updated  (daughter) via phone.    Current Length of Stay: 4 day(s)  Current Patient Status: " Inpatient   Certification Statement: The patient will continue to require additional inpatient hospital stay due to weakness from pneumonia  Discharge Plan: Anticipate discharge in 24-48 hrs to home.    Code Status: Level 1 - Full Code    Subjective   Patient seen at bedside this morning. She states her shortness of breath is not improving, especially when getting up to walk around. She would like something for the cough at night and also admits to slight headache for which she is given tylenol. She was never given the ordered oxygen via nasal cannula but appears to be saturating better today in the low 90s. Patient informed that her hemoglobin was 6.6 this morning and consent was obtained for blood transfusion.     Objective :  Temp:  [98.5 °F (36.9 °C)-99 °F (37.2 °C)] 98.6 °F (37 °C)  HR:  [82-91] 91  BP: (102-151)/(56-81) 116/72  Resp:  [18-19] 18  SpO2:  [91 %-99 %] 99 %  O2 Device: None (Room air)    Body mass index is 20.85 kg/m².     Input and Output Summary (last 24 hours):   No intake or output data in the 24 hours ending 11/29/24 0825    Physical Exam  Constitutional:       General: She is not in acute distress.     Appearance: Normal appearance. She is not ill-appearing.   HENT:      Head: Normocephalic and atraumatic.      Right Ear: External ear normal.      Left Ear: External ear normal.      Nose: Nose normal.      Mouth/Throat:      Mouth: Mucous membranes are moist.      Pharynx: Oropharynx is clear.   Eyes:      Conjunctiva/sclera: Conjunctivae normal.   Cardiovascular:      Rate and Rhythm: Normal rate and regular rhythm.      Pulses: Normal pulses.      Heart sounds: Normal heart sounds.   Pulmonary:      Effort: Pulmonary effort is normal.      Breath sounds: Normal breath sounds.      Comments: Decreased breath sounds diffusely, saturating on room air 92%  Abdominal:      General: Abdomen is flat. Bowel sounds are normal. There is no distension.      Palpations: Abdomen is soft.       Tenderness: There is no abdominal tenderness.   Musculoskeletal:         General: No swelling.   Skin:     General: Skin is warm and dry.      Capillary Refill: Capillary refill takes less than 2 seconds.   Neurological:      General: No focal deficit present.      Mental Status: She is alert and oriented to person, place, and time.   Psychiatric:         Mood and Affect: Mood normal.         Behavior: Behavior normal.         Lab Results: I have reviewed the following results:   Results from last 7 days   Lab Units 11/29/24 0446 11/28/24  0501 11/27/24 0436 11/26/24  0928   WBC Thousand/uL 2.79* 3.20*   < > 3.53*   HEMOGLOBIN g/dL 6.6* 7.3*   < > 7.4*   HEMATOCRIT % 20.9* 22.6*   < > 23.0*   PLATELETS Thousands/uL 223 190   < > 127*   BANDS PCT %  --   --   --  2   SEGS PCT %  --  76*  --   --    LYMPHO PCT %  --  5*  --  3*   MONO PCT %  --  8  --  0*   EOS PCT %  --  2  --  0    < > = values in this interval not displayed.     Results from last 7 days   Lab Units 11/29/24 0446 11/25/24 0732 11/24/24  1930   SODIUM mmol/L 140   < > 139   POTASSIUM mmol/L 3.7   < > 3.8   CHLORIDE mmol/L 106   < > 105   CO2 mmol/L 25   < > 23   BUN mg/dL 19   < > 22   CREATININE mg/dL 0.84   < > 0.99   ANION GAP mmol/L 9   < > 11   CALCIUM mg/dL 8.9   < > 9.1   ALBUMIN g/dL  --   --  3.8   TOTAL BILIRUBIN mg/dL  --   --  0.27   ALK PHOS U/L  --   --  41   ALT U/L  --   --  12   AST U/L  --   --  10*   GLUCOSE RANDOM mg/dL 81   < > 129    < > = values in this interval not displayed.     Results from last 7 days   Lab Units 11/24/24  1930   INR  0.91             Results from last 7 days   Lab Units 11/29/24  0436 11/26/24  0928 11/26/24  0515 11/25/24  0732 11/24/24 2237 11/24/24 2127   LACTIC ACID mmol/L  --  1.2  --   --  1.4  --    PROCALCITONIN ng/ml 0.21  --  0.15 0.10  --  0.12       Recent Cultures (last 7 days):   Results from last 7 days   Lab Units 11/24/24  2278 11/24/24  7006   BLOOD CULTURE  No Growth After 4 Days.   No Growth After 4 Days.  --    URINE CULTURE   --  <10,000 cfu/ml       Imaging Results Review: No pertinent imaging studies reviewed.  Other Study Results Review: No additional pertinent studies reviewed.    Last 24 Hours Medication List:     Current Facility-Administered Medications:     acetaminophen (TYLENOL) tablet 650 mg, Q6H KRISS    aluminum-magnesium hydroxide-simethicone (MAALOX) oral suspension 30 mL, Q6H PRN    benzonatate (TESSALON PERLES) capsule 200 mg, TID PRN    ceftriaxone (ROCEPHIN) 1 g/50 mL in dextrose IVPB, Q24H, Last Rate: 1,000 mg (11/28/24 1300)    cyanocobalamin (VITAMIN B-12) tablet 1,000 mcg, Daily    dexamethasone (DECADRON) tablet 2 mg, Daily    folic acid (FOLVITE) tablet 1 mg, Daily    gabapentin (NEURONTIN) capsule 100 mg, BID before breakfast/lunch    gabapentin (NEURONTIN) capsule 300 mg, HS    ibuprofen (MOTRIN) tablet 400 mg, Q6H PRN    lactobacillus acidophilus-bulgaricus (FLORANEX) packet 1 packet, Daily    levETIRAcetam (KEPPRA) tablet 1,000 mg, Q12H KRISS    levothyroxine tablet 50 mcg, Early Morning    LORazepam (ATIVAN) injection 1 mg, Q8H PRN    LORazepam (ATIVAN) injection 2 mg, Q8H PRN    ondansetron (ZOFRAN) injection 4 mg, Q6H PRN    pantoprazole (PROTONIX) EC tablet 40 mg, Early Morning    rivaroxaban (XARELTO) tablet 15 mg, BID With Meals    senna (SENOKOT) tablet 8.6 mg, HS PRN    Facility-Administered Medications Ordered in Other Encounters:     fentaNYL injection, PRN    midazolam (VERSED) injection, PRN    Administrative Statements   Today, Patient Was Seen By: Fabby Monzon, DO  I have spent a total time of 60 minutes in caring for this patient on the day of the visit/encounter including Diagnostic results, Prognosis, Risks and benefits of tx options, Instructions for management, Patient and family education, Importance of tx compliance, Risk factor reductions, Impressions, Counseling / Coordination of care, Documenting in the medical record, Reviewing / ordering  tests, medicine, procedures  , Obtaining or reviewing history  , and Communicating with other healthcare professionals .    **Please Note: This note may have been constructed using a voice recognition system.**

## 2024-11-29 NOTE — ASSESSMENT & PLAN NOTE
Follows with Dr. Penn, currently on cycle 2 of pemetrexed/carboplatin/pembrolizumab with last infusion 11/14/2024

## 2024-11-29 NOTE — ASSESSMENT & PLAN NOTE
Insetting of chemotherapy, may be masking typical immune response explaining negative procalcitonin and low WBC

## 2024-11-29 NOTE — ANESTHESIA POSTPROCEDURE EVALUATION
Post-Op Assessment Note    CV Status:  Stable    Pain management: adequate       Mental Status:  Alert and awake   Hydration Status:  Euvolemic   PONV Controlled:  Controlled   Airway Patency:  Patent     Post Op Vitals Reviewed: Yes    No anethesia notable event occurred.    Staff: Anesthesiologist           Last Filed PACU Vitals:  Vitals Value Taken Time   Temp 97.5 °F (36.4 °C) 11/29/24 1612   Pulse 82 11/29/24 1625   /87 11/29/24 1625   Resp 18 11/29/24 1625   SpO2 94 % 11/29/24 1628       Modified Nita:  Activity: 2 (11/29/2024  4:25 PM)  Respiration: 2 (11/29/2024  4:25 PM)  Circulation: 2 (11/29/2024  4:25 PM)  Consciousness: 2 (11/29/2024  4:25 PM)  Oxygen Saturation: 2 (11/29/2024  4:25 PM)  Modified Nita Score: 10 (11/29/2024  4:25 PM)

## 2024-11-29 NOTE — QUICK NOTE
Per pulmonology recommendations, patient will be switched from ceftriaxone antibiotic to cefepime plus vancomycin. Pharmacy consult placed for vancomycin dosing.

## 2024-11-29 NOTE — PLAN OF CARE
Problem: Potential for Falls  Goal: Patient will remain free of falls  Description: INTERVENTIONS:  - Educate patient/family on patient safety including physical limitations  - Instruct patient to call for assistance with activity   - Consult OT/PT to assist with strengthening/mobility   - Keep Call bell within reach  - Keep bed low and locked with side rails adjusted as appropriate  - Keep care items and personal belongings within reach  - Initiate and maintain comfort rounds  - Make Fall Risk Sign visible to staff  - Apply yellow socks and bracelet for high fall risk patients  - Consider moving patient to room near nurses station  Outcome: Progressing     Problem: INFECTION - ADULT  Goal: Absence or prevention of progression during hospitalization  Description: INTERVENTIONS:  - Assess and monitor for signs and symptoms of infection  - Monitor lab/diagnostic results  - Monitor all insertion sites, i.e. indwelling lines, tubes, and drains  - Monitor endotracheal if appropriate and nasal secretions for changes in amount and color  - [place] appropriate cooling/warming therapies per order  - Administer medications as ordered  - Instruct and encourage patient and family to use good hand hygiene technique  - Identify and instruct in appropriate isolation precautions for identified infection/condition  Outcome: Progressing     Problem: SAFETY ADULT  Goal: Patient will remain free of falls  Description: INTERVENTIONS:  - Educate patient/family on patient safety including physical limitations  - Instruct patient to call for assistance with activity   - Consult OT/PT to assist with strengthening/mobility   - Keep Call bell within reach  - Keep bed low and locked with side rails adjusted as appropriate  - Keep care items and personal belongings within reach  - Initiate and maintain comfort rounds  - Make Fall Risk Sign visible to staff  - Apply yellow socks and bracelet for high fall risk patients  - Consider moving  patient to room near nurses station  Outcome: Progressing     Problem: Knowledge Deficit  Goal: Patient/family/caregiver demonstrates understanding of disease process, treatment plan, medications, and discharge instructions  Description: Complete learning assessment and assess knowledge base.  Interventions:  - Provide teaching at level of understanding  - Provide teaching via preferred learning methods  Outcome: Progressing

## 2024-11-29 NOTE — ASSESSMENT & PLAN NOTE
Noted initially on admit CXR and CT chest, MRSA swab negative  Has been on Rocephin with continued fevers and CXR on 11/29 with worsening RUL infiltrates  Tentative plan for bronchoscopy today and will escalate antibiotics after

## 2024-11-29 NOTE — ASSESSMENT & PLAN NOTE
Patient reached sepsis criteria AM of 11/26 due to fevers, elevated wbc count, and HR >90. LA ordered and normal. Procal continues to be normal. Will continue to monitor with antibiotics for penumonia, and heparin drip for PE. Will continue to get daily labs.   AM of 11/29: patient no longer septic due to no fevers in the past 24 hours, but still having mild temperature elevations and Hrs in the 90s. Will monitor.

## 2024-11-29 NOTE — ASSESSMENT & PLAN NOTE
Blood Pressure: 116/72  Recent Labs     11/27/24  0436 11/28/24  0501 11/29/24  0446   HGB 7.3* 7.3* 6.6*   BL 8-11    Plan:  -monitor, on Xarelto.   - on AM of 11/29, patient had hemoglobin of 6.6. no coughing up blood, or blood in stool or urine. Only slight bleeding with blowing her nose that she notes. Prepared and gave 1 unit of leukoreduced PRBCs. Repeat H&H at 4 pm.  Transfuse if Hb < 7

## 2024-11-30 LAB
ABO GROUP BLD BPU: NORMAL
ANION GAP SERPL CALCULATED.3IONS-SCNC: 11 MMOL/L (ref 4–13)
ANISOCYTOSIS BLD QL SMEAR: PRESENT
BACTERIA BLD CULT: NORMAL
BACTERIA BLD CULT: NORMAL
BASOPHILS # BLD MANUAL: 0.04 THOUSAND/UL (ref 0–0.1)
BASOPHILS NFR MAR MANUAL: 1 % (ref 0–1)
BPU ID: NORMAL
BUN SERPL-MCNC: 16 MG/DL (ref 5–25)
CALCIUM SERPL-MCNC: 8.7 MG/DL (ref 8.4–10.2)
CHLORIDE SERPL-SCNC: 104 MMOL/L (ref 96–108)
CO2 SERPL-SCNC: 20 MMOL/L (ref 21–32)
CREAT SERPL-MCNC: 0.83 MG/DL (ref 0.6–1.3)
CROSSMATCH: NORMAL
CRP SERPL QL: 146.1 MG/L
EOSINOPHIL # BLD MANUAL: 0.07 THOUSAND/UL (ref 0–0.4)
EOSINOPHIL NFR BLD MANUAL: 2 % (ref 0–6)
ERYTHROCYTE [DISTWIDTH] IN BLOOD BY AUTOMATED COUNT: 17 % (ref 11.6–15.1)
GFR SERPL CREATININE-BSD FRML MDRD: 69 ML/MIN/1.73SQ M
GLUCOSE SERPL-MCNC: 86 MG/DL (ref 65–140)
HCT VFR BLD AUTO: 27.3 % (ref 34.8–46.1)
HGB BLD-MCNC: 8.6 G/DL (ref 11.5–15.4)
LG PLATELETS BLD QL SMEAR: PRESENT
LYMPHOCYTES # BLD AUTO: 0.18 THOUSAND/UL (ref 0.6–4.47)
LYMPHOCYTES # BLD AUTO: 5 % (ref 14–44)
MACROCYTES BLD QL AUTO: PRESENT
MCH RBC QN AUTO: 31.4 PG (ref 26.8–34.3)
MCHC RBC AUTO-ENTMCNC: 31.5 G/DL (ref 31.4–37.4)
MCV RBC AUTO: 100 FL (ref 82–98)
METAMYELOCYTE ABSOLUTE CT: 0.11 THOUSAND/UL (ref 0–0.1)
METAMYELOCYTES NFR BLD MANUAL: 3 % (ref 0–1)
MONOCYTES # BLD AUTO: 0.11 THOUSAND/UL (ref 0–1.22)
MONOCYTES NFR BLD: 3 % (ref 4–12)
MYELOCYTE ABSOLUTE CT: 0.04 THOUSAND/UL (ref 0–0.1)
MYELOCYTES NFR BLD MANUAL: 1 % (ref 0–1)
NEUTROPHILS # BLD MANUAL: 3.01 THOUSAND/UL (ref 1.85–7.62)
NEUTS BAND NFR BLD MANUAL: 3 % (ref 0–8)
NEUTS SEG NFR BLD AUTO: 82 % (ref 43–75)
NRBC BLD AUTO-RTO: 1 /100 WBC (ref 0–2)
PLATELET # BLD AUTO: 266 THOUSANDS/UL (ref 149–390)
PLATELET BLD QL SMEAR: ADEQUATE
PMV BLD AUTO: 9.1 FL (ref 8.9–12.7)
POLYCHROMASIA BLD QL SMEAR: PRESENT
POTASSIUM SERPL-SCNC: 4.3 MMOL/L (ref 3.5–5.3)
RBC # BLD AUTO: 2.74 MILLION/UL (ref 3.81–5.12)
RBC MORPH BLD: PRESENT
RHODAMINE-AURAMINE STN SPEC: NORMAL
SODIUM SERPL-SCNC: 135 MMOL/L (ref 135–147)
UNIT DISPENSE STATUS: NORMAL
UNIT PRODUCT CODE: NORMAL
UNIT PRODUCT VOLUME: 350 ML
UNIT RH: NORMAL
WBC # BLD AUTO: 3.54 THOUSAND/UL (ref 4.31–10.16)

## 2024-11-30 PROCEDURE — 86140 C-REACTIVE PROTEIN: CPT

## 2024-11-30 PROCEDURE — 85027 COMPLETE CBC AUTOMATED: CPT

## 2024-11-30 PROCEDURE — 99232 SBSQ HOSP IP/OBS MODERATE 35: CPT | Performed by: INTERNAL MEDICINE

## 2024-11-30 PROCEDURE — 80048 BASIC METABOLIC PNL TOTAL CA: CPT

## 2024-11-30 PROCEDURE — 85007 BL SMEAR W/DIFF WBC COUNT: CPT

## 2024-11-30 PROCEDURE — 99233 SBSQ HOSP IP/OBS HIGH 50: CPT | Performed by: FAMILY MEDICINE

## 2024-11-30 RX ADMIN — ACETAMINOPHEN 650 MG: 325 TABLET, FILM COATED ORAL at 23:22

## 2024-11-30 RX ADMIN — LEVETIRACETAM 1000 MG: 250 TABLET, FILM COATED ORAL at 08:56

## 2024-11-30 RX ADMIN — GABAPENTIN 100 MG: 100 CAPSULE ORAL at 06:07

## 2024-11-30 RX ADMIN — PANTOPRAZOLE SODIUM 40 MG: 40 TABLET, DELAYED RELEASE ORAL at 06:07

## 2024-11-30 RX ADMIN — ACETAMINOPHEN 650 MG: 325 TABLET, FILM COATED ORAL at 17:18

## 2024-11-30 RX ADMIN — GABAPENTIN 300 MG: 300 CAPSULE ORAL at 21:45

## 2024-11-30 RX ADMIN — Medication 1 PACKET: at 08:58

## 2024-11-30 RX ADMIN — CEFEPIME 2000 MG: 2 INJECTION, POWDER, FOR SOLUTION INTRAVENOUS at 17:13

## 2024-11-30 RX ADMIN — CEFEPIME 2000 MG: 2 INJECTION, POWDER, FOR SOLUTION INTRAVENOUS at 06:07

## 2024-11-30 RX ADMIN — ACETAMINOPHEN 650 MG: 325 TABLET, FILM COATED ORAL at 12:07

## 2024-11-30 RX ADMIN — SODIUM CHLORIDE, SODIUM LACTATE, POTASSIUM CHLORIDE, AND CALCIUM CHLORIDE 100 ML/HR: .6; .31; .03; .02 INJECTION, SOLUTION INTRAVENOUS at 21:46

## 2024-11-30 RX ADMIN — VANCOMYCIN HYDROCHLORIDE 1250 MG: 5 INJECTION, POWDER, LYOPHILIZED, FOR SOLUTION INTRAVENOUS at 10:11

## 2024-11-30 RX ADMIN — LEVOTHYROXINE SODIUM 50 MCG: 0.05 TABLET ORAL at 06:07

## 2024-11-30 RX ADMIN — RIVAROXABAN 15 MG: 15 TABLET, FILM COATED ORAL at 17:18

## 2024-11-30 RX ADMIN — CYANOCOBALAMIN TAB 500 MCG 1000 MCG: 500 TAB at 08:56

## 2024-11-30 RX ADMIN — FOLIC ACID 1 MG: 1 TABLET ORAL at 08:57

## 2024-11-30 RX ADMIN — DEXAMETHASONE 2 MG: 2 TABLET ORAL at 08:56

## 2024-11-30 RX ADMIN — SODIUM CHLORIDE, SODIUM LACTATE, POTASSIUM CHLORIDE, AND CALCIUM CHLORIDE 100 ML/HR: .6; .31; .03; .02 INJECTION, SOLUTION INTRAVENOUS at 06:06

## 2024-11-30 RX ADMIN — LEVETIRACETAM 1000 MG: 250 TABLET, FILM COATED ORAL at 21:45

## 2024-11-30 RX ADMIN — ACETAMINOPHEN 650 MG: 325 TABLET, FILM COATED ORAL at 06:07

## 2024-11-30 RX ADMIN — RIVAROXABAN 15 MG: 15 TABLET, FILM COATED ORAL at 08:57

## 2024-11-30 RX ADMIN — IBUPROFEN 400 MG: 400 TABLET, FILM COATED ORAL at 09:04

## 2024-11-30 RX ADMIN — GABAPENTIN 100 MG: 100 CAPSULE ORAL at 12:07

## 2024-11-30 NOTE — PROGRESS NOTES
"Progress Note - Pulmonary   Ayla Nye 74 y.o. female MRN: [id number]  Unit/Bed#: S -01 Encounter: [id number]      Interval History:   Patient complains of cough and shortness of breath that is similar to yesterday.  No fevers or chills.  Unable to produce sputum.    Review of Systems:  Full 12 point review of systems negative other than previously mentioned    Objective:   Vitals: Blood pressure 158/82, pulse 99, temperature 100.2 °F (37.9 °C), temperature source Oral, resp. rate 17, height 5' 2\" (1.575 m), weight 51.7 kg (114 lb), SpO2 (!) 89%., Body mass index is 20.85 kg/m².  No acute distress  S1-S2  Fine rales in the right apex, otherwise clear to auscultation bilaterally  Soft, nontender, nondistended  No edema  Awake and alert    Labs: I have personally reviewed pertinent lab results.  Results Reviewed       Procedure Component Value Units Date/Time    Blood culture #1 [[id number]] Collected: 11/24/24 2233    Lab Status: Final result Specimen: Blood from Arm, Right Updated: 11/30/24 0701     Blood Culture No Growth After 5 Days.    Blood culture #2 [[id number]] Collected: 11/24/24 2233    Lab Status: Final result Specimen: Blood from Arm, Right Updated: 11/30/24 0701     Blood Culture No Growth After 5 Days.    MRSA culture [[id number]]  (Normal) Collected: 11/25/24 1203    Lab Status: Final result Specimen: Nares from Nose Updated: 11/26/24 1931     MRSA Culture Only No Methicillin Resistant Staphlyococcus aureus (MRSA) isolated    Urine culture [[id number]] Collected: 11/24/24 2127    Lab Status: Final result Specimen: Urine, Clean Catch Updated: 11/26/24 0659     Urine Culture <10,000 cfu/ml    Basic metabolic panel [[id number]] Collected: 11/26/24 0515    Lab Status: Final result Specimen: Blood from Arm, Left Updated: 11/26/24 0637     Sodium 138 mmol/L      Potassium 4.0 mmol/L      Chloride 104 mmol/L      CO2 25 mmol/L      ANION GAP 9 mmol/L      BUN 15 mg/dL      Creatinine 0.91 mg/dL      " Glucose 84 mg/dL      Calcium 9.0 mg/dL      eGFR 62 ml/min/1.73sq m     Narrative:      National Kidney Disease Foundation guidelines for Chronic Kidney Disease (CKD):     Stage 1 with normal or high GFR (GFR > 90 mL/min/1.73 square meters)    Stage 2 Mild CKD (GFR = 60-89 mL/min/1.73 square meters)    Stage 3A Moderate CKD (GFR = 45-59 mL/min/1.73 square meters)    Stage 3B Moderate CKD (GFR = 30-44 mL/min/1.73 square meters)    Stage 4 Severe CKD (GFR = 15-29 mL/min/1.73 square meters)    Stage 5 End Stage CKD (GFR <15 mL/min/1.73 square meters)  Note: GFR calculation is accurate only with a steady state creatinine    Vancomycin, trough Please draw trough peripherally. [[id number]]  (Normal) Collected: 11/26/24 0515    Lab Status: Final result Specimen: Blood from Arm, Left Updated: 11/26/24 0626     Vancomycin Tr 12.4 ug/mL     Narrative:      Verify that this specimen was collected immediately prior to drug administration.    Reference range and result flagging reflect proper specimen collection protocol.    APTT [[id number]]  (Abnormal) Collected: 11/25/24 1614    Lab Status: Final result Specimen: Blood from Arm, Left Updated: 11/25/24 1712     PTT 61 seconds     Magnesium [[id number]]  (Normal) Collected: 11/25/24 0732    Lab Status: Final result Specimen: Blood from Arm, Left Updated: 11/25/24 1036     Magnesium 2.5 mg/dL     APTT [[id number]]  (Abnormal) Collected: 11/25/24 0732    Lab Status: Final result Specimen: Blood from Arm, Left Updated: 11/25/24 1010     PTT 94 seconds     Narrative:      Verified by repeat    RBC Morphology Reflex Test [[id number]] Collected: 11/25/24 0522    Lab Status: Final result Specimen: Blood from Arm, Left Updated: 11/25/24 0901    CBC and differential [[id number]]  (Abnormal) Collected: 11/25/24 0522    Lab Status: Final result Specimen: Blood from Arm, Left Updated: 11/25/24 0857     WBC 2.16 Thousand/uL      RBC 2.24 Million/uL      Hemoglobin 7.3 g/dL      Hematocrit 23.1 %        fL      MCH 32.6 pg      MCHC 31.6 g/dL      RDW 15.6 %      MPV 10.5 fL      Platelets 114 Thousands/uL     Narrative:      This is an appended report.  These results have been appended to a previously verified report.    Manual Differential(PHLEBS Do Not Order) [[id number]]  (Abnormal) Collected: 11/25/24 0522    Lab Status: Final result Specimen: Blood from Arm, Left Updated: 11/25/24 0857     Segmented % 75 %      Bands % 2 %      Lymphocytes % 18 %      Monocytes % 3 %      Eosinophils % 1 %      Basophils % 0 %      Myelocytes % 1 %      Absolute Neutrophils 1.66 Thousand/uL      Absolute Lymphocytes 0.39 Thousand/uL      Absolute Monocytes 0.06 Thousand/uL      Absolute Eosinophils 0.02 Thousand/uL      Absolute Basophils 0.00 Thousand/uL      Absolute Myelocytes 0.02 Thousand/uL      Total Counted --     RBC Morphology Present     Platelet Estimate Borderline     Anisocytosis Present     Polychromasia Present    Procalcitonin [[id number]]  (Normal) Collected: 11/25/24 0732    Lab Status: Final result Specimen: Blood from Arm, Left Updated: 11/25/24 0806     Procalcitonin 0.10 ng/ml     Basic metabolic panel [[id number]] Collected: 11/25/24 0732    Lab Status: Final result Specimen: Blood from Arm, Left Updated: 11/25/24 0806     Sodium 136 mmol/L      Potassium 3.5 mmol/L      Chloride 103 mmol/L      CO2 24 mmol/L      ANION GAP 9 mmol/L      BUN 16 mg/dL      Creatinine 0.81 mg/dL      Glucose 82 mg/dL      Calcium 8.9 mg/dL      eGFR 71 ml/min/1.73sq m     Narrative:      National Kidney Disease Foundation guidelines for Chronic Kidney Disease (CKD):     Stage 1 with normal or high GFR (GFR > 90 mL/min/1.73 square meters)    Stage 2 Mild CKD (GFR = 60-89 mL/min/1.73 square meters)    Stage 3A Moderate CKD (GFR = 45-59 mL/min/1.73 square meters)    Stage 3B Moderate CKD (GFR = 30-44 mL/min/1.73 square meters)    Stage 4 Severe CKD (GFR = 15-29 mL/min/1.73 square meters)    Stage 5 End Stage CKD  (GFR <15 mL/min/1.73 square meters)  Note: GFR calculation is accurate only with a steady state creatinine    Peripheral Smear [[id number]]     Lab Status: No result Specimen: Blood     B-Type Natriuretic Peptide(BNP) [[id number]]  (Normal) Collected: 11/24/24 1930    Lab Status: Final result Specimen: Blood from Arm, Right Updated: 11/25/24 0122     BNP 47 pg/mL     Lactic acid, plasma (w/reflex if result > 2.0) [[id number]]  (Normal) Collected: 11/24/24 2237    Lab Status: Final result Specimen: Blood from Arm, Right Updated: 11/24/24 2309     LACTIC ACID 1.4 mmol/L     Narrative:      Result may be elevated if tourniquet was used during collection.    FLU/COVID Rapid Antigen (30 min. TAT) - Preferred screening test in ED [[id number]]  (Normal) Collected: 11/24/24 2233    Lab Status: Final result Specimen: Nares from Nose Updated: 11/24/24 2303     SARS COV Rapid Antigen Negative     Influenza A Rapid Antigen Negative     Influenza B Rapid Antigen Negative    Narrative:      This test has been performed using the [employer] Gh[employer] 2 FLU+SARS Antigen test under the Emergency Use Authorization (EUA). This test has been validated by the  and verified by the performing laboratory. The Ghada uses lateral flow immunofluorescent sandwich assay to detect SARS-COV, Influenza A and Influenza B Antigen.     The [employer] Ghada 2 SARS Antigen test does not differentiate between SARS-CoV and SARS-CoV-2.     Negative results are presumptive and may be confirmed with a molecular assay, if necessary, for patient management. Negative results do not rule out SARS-CoV-2 or influenza infection and should not be used as the sole basis for treatment or patient management decisions. A negative test result may occur if the level of antigen in a sample is below the limit of detection of this test.     Positive results are indicative of the presence of viral antigens, but do not rule out bacterial infection or co-infection with other  viruses.     All test results should be used as an adjunct to clinical observations and other information available to the provider.    FOR PEDIATRIC PATIENTS - copy/paste COVID Guidelines URL to browser: [email or web address]    [place] draw [[id number]] Collected: 11/24/24 1930    Lab Status: Final result Specimen: Blood from Arm, Right Updated: 11/24/24 2302    Narrative:      The following orders were created for panel order [place] draw.  Procedure                               Abnormality         Status                     ---------                               -----------         ------                     Light Blue Top on hold[[id number]]                           Final result               Green / Yellow tube on hold[[id number]]                      Final result               Green / Black tube on hold[[id number]]                       Final result                 Please view results for these tests on the individual orders.    TSH, 3rd generation with Free T4 reflex [[id number]]  (Normal) Collected: 11/24/24 1930    Lab Status: Final result Specimen: Blood from Arm, Right Updated: 11/24/24 2300     TSH 3RD GENERATON 1.109 uIU/mL     D-Dimer [[id number]]  (Abnormal) Collected: 11/24/24 2127    Lab Status: Final result Specimen: Blood from Arm, Right Updated: 11/24/24 2300     D-Dimer, Quant 1.57 ug/ml FEU     Narrative:      In the evaluation for possible pulmonary embolism, in the appropriate (Well's Score of 4 or less) patient, the age adjusted d-dimer cutoff for this patient can be calculated as:    Age x 0.01 (in ug/mL) for Age-adjusted D-dimer exclusion threshold for a patient over 50 years.    Procalcitonin [[id number]]  (Normal) Collected: 11/24/24 2127    Lab Status: Final result Specimen: Blood from Arm, Right Updated: 11/24/24 2253     Procalcitonin 0.12 ng/ml     Urine Microscopic [[id number]]  (Abnormal) Collected: 11/24/24 2127    Lab Status: Final  result Specimen: Urine, Clean Catch Updated: 11/24/24 2229     RBC, UA 2-4 /hpf      WBC, UA 4-10 /hpf      Epithelial Cells Occasional /hpf      Bacteria, UA None Seen /hpf     UA w Reflex to Microscopic w Reflex to Culture [[id number]]  (Abnormal) Collected: 11/24/24 2127    Lab Status: Final result Specimen: Urine, Clean Catch Updated: 11/24/24 2204     Color, UA Yellow     Clarity, UA Clear     Specific Gravity, UA 1.033     pH, UA 5.5     Leukocytes, UA Trace     Nitrite, UA Negative     Protein, UA 50 (1+) mg/dl      Glucose, UA Negative mg/dl      Ketones, UA Trace mg/dl      Urobilinogen, UA <2.0 mg/dl      Bilirubin, UA Negative     Occult Blood, UA Small    HS Troponin I 2hr [[id number]]  (Normal) Collected: 11/24/24 2127    Lab Status: Final result Specimen: Blood from Arm, Right Updated: 11/24/24 2204     hs TnI 2hr 5 ng/L      Delta 2hr hsTnI 0 ng/L     RBC Morphology Reflex Test [[id number]] Collected: 11/24/24 1930    Lab Status: Final result Specimen: Blood from Arm, Right Updated: 11/24/24 2101    CBC and differential [[id number]]  (Abnormal) Collected: 11/24/24 1930    Lab Status: Final result Specimen: Blood from Arm, Right Updated: 11/24/24 2021     WBC 2.26 Thousand/uL      RBC 2.46 Million/uL      Hemoglobin 8.0 g/dL      Hematocrit 24.6 %       fL      MCH 32.5 pg      MCHC 32.5 g/dL      RDW 15.1 %      MPV 9.5 fL      Platelets 116 Thousands/uL     Narrative:      This is an appended report.  These results have been appended to a previously verified report.    Manual Differential(PHLEBS Do Not Order) [[id number]]  (Abnormal) Collected: 11/24/24 1930    Lab Status: Final result Specimen: Blood from Arm, Right Updated: 11/24/24 2021     Segmented % 66 %      Bands % 2 %      Lymphocytes % 11 %      Monocytes % 14 %      Eosinophils % 2 %      Basophils % 1 %      Metamyelocytes % 2 %      Myelocytes % 2 %      Absolute Neutrophils 1.54 Thousand/uL      Absolute Lymphocytes 0.25 Thousand/uL       Absolute Monocytes 0.32 Thousand/uL      Absolute Eosinophils 0.05 Thousand/uL      Absolute Basophils 0.02 Thousand/uL      Absolute Metamyelocytes 0.05 Thousand/uL      Absolute Myelocytes 0.05 Thousand/uL      Total Counted --     RBC Morphology Present     Platelet Estimate Borderline     Anisocytosis Present     Polychromasia Present    HS Troponin 0hr (reflex protocol) [[id number]]  (Normal) Collected: 11/24/24 1930    Lab Status: Final result Specimen: Blood from Arm, Right Updated: 11/24/24 2013     hs TnI 0hr 5 ng/L     Comprehensive metabolic panel [[id number]]  (Abnormal) Collected: 11/24/24 1930    Lab Status: Final result Specimen: Blood from Arm, Right Updated: 11/24/24 2006     Sodium 139 mmol/L      Potassium 3.8 mmol/L      Chloride 105 mmol/L      CO2 23 mmol/L      ANION GAP 11 mmol/L      BUN 22 mg/dL      Creatinine 0.99 mg/dL      Glucose 129 mg/dL      Calcium 9.1 mg/dL      AST 10 U/L      ALT 12 U/L      Alkaline Phosphatase 41 U/L      Total Protein 6.7 g/dL      Albumin 3.8 g/dL      Total Bilirubin 0.27 mg/dL      eGFR 56 ml/min/1.73sq m     Narrative:      National Kidney Disease Foundation guidelines for Chronic Kidney Disease (CKD):     Stage 1 with normal or high GFR (GFR > 90 mL/min/1.73 square meters)    Stage 2 Mild CKD (GFR = 60-89 mL/min/1.73 square meters)    Stage 3A Moderate CKD (GFR = 45-59 mL/min/1.73 square meters)    Stage 3B Moderate CKD (GFR = 30-44 mL/min/1.73 square meters)    Stage 4 Severe CKD (GFR = 15-29 mL/min/1.73 square meters)    Stage 5 End Stage CKD (GFR <15 mL/min/1.73 square meters)  Note: GFR calculation is accurate only with a steady state creatinine    Magnesium [[id number]]  (Abnormal) Collected: 11/24/24 1930    Lab Status: Final result Specimen: Blood from Arm, Right Updated: 11/24/24 2006     Magnesium 1.7 mg/dL     APTT [[id number]]  (Normal) Collected: 11/24/24 1930    Lab Status: Final result Specimen: Blood from Arm, Right Updated: 11/24/24 2003      PTT 29 seconds     Protime-INR [[id number]]  (Normal) Collected: 11/24/24 1930    Lab Status: Final result Specimen: Blood from Arm, Right Updated: 11/24/24 2003     Protime 13.0 seconds      INR 0.91    Narrative:      INR Therapeutic Range    Indication                                             INR Range      Atrial Fibrillation                                               2.0-3.0  Hypercoagulable State                                    2.0.2.3  Left Ventricular Asist Device                            2.0-3.0  Mechanical Heart Valve                                  -    Aortic(with afib, MI, embolism, HF, LA enlargement,    and/or coagulopathy)                                     2.0-3.0 (2.5-3.5)     Mitral                                                             2.5-3.5  Prosthetic/Bioprosthetic Heart Valve               2.0-3.0  Venous thromboembolism (VTE: VT, PE        2.0-3.0             Current Medications:    Current Facility-Administered Medications:     acetaminophen (TYLENOL) tablet 650 mg, 650 mg, Oral, Q6H KRISS, Yuan Stephenson MD, 650 mg at 11/30/24 0607    aluminum-magnesium hydroxide-simethicone (MAALOX) oral suspension 30 mL, 30 mL, Oral, Q6H PRN, Yuan Stephenson MD    benzonatate (TESSALON PERLES) capsule 200 mg, 200 mg, Oral, TID PRN, Fabby Monzon DO    ceFEPime (MAXIPIME) 2,000 mg in dextrose 5 % 50 mL IVPB, 2,000 mg, Intravenous, Q12H, Fabby Monzon DO, Last Rate: 100 mL/hr at 11/30/24 0607, 2,000 mg at 11/30/24 0607    cyanocobalamin (VITAMIN B-12) tablet 1,000 mcg, 1,000 mcg, Oral, Daily, Yuan Stephenson MD, 1,000 mcg at 11/29/24 0912    dexamethasone (DECADRON) tablet 2 mg, 2 mg, Oral, Daily, uYan Stephenson MD, 2 mg at 11/29/24 0912    folic acid (FOLVITE) tablet 1 mg, 1 mg, Oral, Daily, Yuan Stephenson MD, 1 mg at 11/29/24 0912    gabapentin (NEURONTIN) capsule 100 mg, 100 mg, Oral, BID before breakfast/lunch, Yuan  MD Sachin, 100 mg at 11/30/24 0607    gabapentin (NEURONTIN) capsule 300 mg, 300 mg, Oral, HS, Yuan Stephenson MD, 300 mg at 11/29/24 2152    ibuprofen (MOTRIN) tablet 400 mg, 400 mg, Oral, Q6H PRN, Fabby Sovak, DO, 400 mg at 11/26/24 1041    lactated ringers infusion, 100 mL/hr, Intravenous, Continuous, Ashley Cuellar CRNA, Last Rate: 100 mL/hr at 11/30/24 0606, 100 mL/hr at 11/30/24 0606    lactobacillus acidophilus-bulgaricus (FLORANEX) packet 1 packet, 1 packet, Oral, Daily, Yuan Stephenson MD, 1 packet at 11/29/24 0916    levETIRAcetam (KEPPRA) tablet 1,000 mg, 1,000 mg, Oral, Q12H KRISS, Yuan Stephenson MD, 1,000 mg at 11/29/24 2151    levothyroxine tablet 50 mcg, 50 mcg, Oral, Early Morning, Yuan Stephenson MD, 50 mcg at 11/30/24 0607    LORazepam (ATIVAN) injection 1 mg, 1 mg, Intramuscular, Q8H PRN, Yuan Stephenson MD    LORazepam (ATIVAN) injection 2 mg, 2 mg, Intravenous, Q8H PRN, Yuan Stephenson MD    ondansetron (ZOFRAN) injection 4 mg, 4 mg, Intravenous, Q6H PRN, Yuan Stephenson MD    pantoprazole (PROTONIX) EC tablet 40 mg, 40 mg, Oral, Early Morning, Yuan Stephenson MD, 40 mg at 11/30/24 0607    rivaroxaban (XARELTO) tablet 15 mg, 15 mg, Oral, BID With Meals, Fabby Sovak, DO, 15 mg at 11/29/24 1758    senna (SENOKOT) tablet 8.6 mg, 1 tablet, Oral, HS PRN, Yuan Stephenson MD, 8.6 mg at 11/27/24 0911    vancomycin (VANCOCIN) 1,250 mg in sodium chloride 0.9 % 250 mL IVPB, 1,250 mg, Intravenous, Q24H, Fabby Monzon,     Facility-Administered Medications Ordered in Other Encounters:     fentaNYL injection, , Intravenous, PRN, Greg Gamble CRNA    midazolam (VERSED) injection, , Intravenous, PRN, Greg Gamble CRNA     Microbiology:  Cultures from bronchoscopy pending    Imaging and other studies:   I personally viewed and interpreted the following imaging studies:  Chest x-ray 11/29/2024 shows  "interstitial pattern opacification of the right upper lobe    Assessment:  Acute hypoxic respiratory failure  Pneumonia  Medically induced immunocompromised state  Adenocarcinoma of the lung    Plan:  Continue to titrate supplemental oxygen for goal SpO2 of greater than 92% the setting of acute hypoxic respiratory failure.  Continue to monitor for cultures from bronchoscopy.  I recommend appropriate antibiotics based on results of bronchoscopy.  Pulmonary medicine will continue to follow  I personally discussed this patient in depth with the primary service    Note: Portions of the record may have been created with voice recognition software. Occasional wrong word or \"sound a like\" substitutions may have occurred due to the inherent limitations of voice recognition software. Read the chart carefully and recognize, using context, where substitutions have occurred.     Salvatore Cervantes M.D.  [facility]    "

## 2024-11-30 NOTE — PLAN OF CARE
Problem: Potential for Falls  Goal: Patient will remain free of falls  Description: INTERVENTIONS:  - Educate patient/family on patient safety including physical limitations  - Instruct patient to call for assistance with activity   - Consult OT/PT to assist with strengthening/mobility   - Keep Call bell within reach  - Keep bed low and locked with side rails adjusted as appropriate  - Keep care items and personal belongings within reach  - Initiate and maintain comfort rounds  - Make Fall Risk Sign visible to staff  - Offer Toileting every 2 Hours, in advance of need  - Initiate/Maintain bed/chair alarm  - Obtain necessary fall risk management equipment: 2  - Apply yellow socks and bracelet for high fall risk patients  - Consider moving patient to room near nurses station  Outcome: Progressing     Problem: INFECTION - ADULT  Goal: Absence or prevention of progression during hospitalization  Description: INTERVENTIONS:  - Assess and monitor for signs and symptoms of infection  - Monitor lab/diagnostic results  - Monitor all insertion sites, i.e. indwelling lines, tubes, and drains  - Monitor endotracheal if appropriate and nasal secretions for changes in amount and color  - [place] appropriate cooling/warming therapies per order  - Administer medications as ordered  - Instruct and encourage patient and family to use good hand hygiene technique  - Identify and instruct in appropriate isolation precautions for identified infection/condition  Outcome: Progressing     Problem: SAFETY ADULT  Goal: Patient will remain free of falls  Description: INTERVENTIONS:  - Educate patient/family on patient safety including physical limitations  - Instruct patient to call for assistance with activity   - Consult OT/PT to assist with strengthening/mobility   - Keep Call bell within reach  - Keep bed low and locked with side rails adjusted as appropriate  - Keep care items and personal belongings within reach  - Initiate and  maintain comfort rounds  - Make Fall Risk Sign visible to staff  - Offer Toileting every 2 Hours, in advance of need  - Initiate/Maintain 2alarm  - Obtain necessary fall risk management equipment: 2  - Apply yellow socks and bracelet for high fall risk patients  - Consider moving patient to room near nurses station  Outcome: Progressing     Problem: Knowledge Deficit  Goal: Patient/family/caregiver demonstrates understanding of disease process, treatment plan, medications, and discharge instructions  Description: Complete learning assessment and assess knowledge base.  Interventions:  - Provide teaching at level of understanding  - Provide teaching via preferred learning methods  Outcome: Progressing     Problem: RESPIRATORY - ADULT  Goal: Achieves optimal ventilation and oxygenation  Description: INTERVENTIONS:  - Assess for changes in respiratory status  - Assess for changes in mentation and behavior  - Position to facilitate oxygenation and minimize respiratory effort  - Oxygen administered by appropriate delivery if ordered  - Initiate smoking cessation education as indicated  - Encourage broncho-pulmonary hygiene including cough, deep breathe, Incentive Spirometry  - Assess the need for suctioning and aspirate as needed  - Assess and instruct to report SOB or any respiratory difficulty  - Respiratory Therapy support as indicated  Outcome: Progressing

## 2024-11-30 NOTE — ASSESSMENT & PLAN NOTE
"    Component      Latest Ref Rn 2/16/2024 3/12/2024 4/5/2024 4/30/2024 5/20/2024   Absolute Neutrophils      1.85 - 7.62 Thousand/uL 6.06  5.47  4.95  5.71  5.87      Component      Latest Ref Rn 5/28/2024 6/3/2024 6/10/2024 6/18/2024 6/22/2024   Absolute Neutrophils      1.85 - 7.62 Thousand/uL 5.89  6.31  5.21  3.42  3.63      Component      Latest Ref Rn 6/23/2024 6/24/2024 7/1/2024 7/2/2024 7/3/2024 7/15/2024   Absolute Neutrophils      1.85 - 7.62 Thousand/uL 2.38  1.58 (L)  1.81 (L)  2.08  1.83 (L)  2.08      Component      Latest Ref Rn 7/29/2024 8/21/2024 8/22/2024 8/23/2024 8/24/2024   Absolute Neutrophils      1.85 - 7.62 Thousand/uL 2.95  6.57  5.54  6.22  4.46      Component      Latest Ref Rn 9/9/2024 9/10/2024 9/11/2024 9/12/2024 9/13/2024   Absolute Neutrophils      1.85 - 7.62 Thousand/uL 5.00  3.22  5.80  4.26  7.84 (H)      Component      Latest Ref Rn 10/3/2024 10/16/2024 10/21/2024 10/22/2024 10/27/2024   Absolute Neutrophils      1.85 - 7.62 Thousand/uL 7.17  1.58 (L)  2.79  4.39  9.78 (H)      Component      Latest Ref Rn 11/11/2024 11/24/2024   Absolute Neutrophils      1.85 - 7.62 Thousand/uL 9.40 (H)  1.54 (L)       No results for input(s): \"AST\", \"ALT\", \"ALKPHOS\", \"TBILI\", \"BILIDIR\" in the last 72 hours.      Fever at home 101.3  Urine culture 11/24: < 10,000 cfu/mL mixed contaminants x2  PLAN:  Antibiotics cefepime and Vanc switched on 11/29 from ceftriaxone due to minimal improvement. Watch for reaction as patient has had rashes with keflex in her hx.   Oncology consulted  Neutropenic precautions  "

## 2024-11-30 NOTE — PROGRESS NOTES
Ayla Nye is a 74 y.o. female who is currently ordered Vancomycin IV with management by the Pharmacy Consult service.  Relevant clinical data and objective / subjective history reviewed.  Vancomycin Assessment:  Indication and Goal AUC/Trough: Pneumonia (goal -600, trough >10)  Clinical Status: stable  Micro:     Renal Function:  SCr: 0.83 mg/dL  CrCl: 41.0 mL/min  Renal replacement: Not on dialysis  Days of Therapy: 2  Current Dose: 1250 mg IV every 24 hours  Vancomycin Plan:  New Dosing: Continue current dosing  Estimated AUC: 531 mcg*hr/mL  Estimated Trough: 12.5 mcg/mL  Next Level: 12.1 at 0600  Renal Function Monitoring: Daily BMP and UOP  Pharmacy will continue to follow closely for s/sx of nephrotoxicity, infusion reactions and appropriateness of therapy.  BMP and CBC will be ordered per protocol. We will continue to follow the patient’s culture results and clinical progress daily.    Rebeca Lopes, Pharmacist

## 2024-11-30 NOTE — PROGRESS NOTES
Progress Note - Hospitalist   Name: Ayla Nye 74 y.o. female I MRN: [id number]  Unit/Bed#: S -01 I Date of Admission: 2024   Date of Service: 2024 I Hospital Day: 5    Assessment & Plan  Malignant neoplasm of upper lobe, right bronchus or lung (HCC)  Stage III NSCLC, new onset brain mets 24  Pt is on immunotherapy ketruda and carboplatin last dose    Oncologist Dr. Castro with [facility].    Radiologist Dr. 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   - on AM of , patient had hemoglobin of 6.6. no coughing up blood, or blood in stool or urine. Only slight bleeding with blowing her nose that she notes. Prepared and gave 1 unit of leukoreduced PRBCs. Repeat H&H afterwards was 9.2.  Transfuse if Hb < 7      Acute pulmonary embolism (HCC)  PERC 2  WELLS 2.5  PESI 104 Class III Intermediate risk   Imagin: CT pe study w abdomen pelvis w contrast  PULMONARY ARTERIAL TREE: Embolism straddles between the middle lobe and right lower lobe basal segment arteries, as seen on series , this is new from the most recent prior.   Additional embolism noted within the left lower lobe basal arteries straddling into the lower lingular segment, as noted on series , also new from prior.  RV diameter at the level of the AV valve = 3. 9 cm  LV diameter at the level of the AV valve = 2.7 cm  Ratio equals 1.4  CLASSIFICATION Acute hemodynamically stable bilateral right sided segmental left basilar  "associated with SOB most likely provoked from cancer hx while on immunotherapy   ECHO 11/25: EF 65%, diastolic dysfunction grade I. Mitral valve has mild annular calcification and tricuspid valve has mild regurgitation.     PLAN  Xarelto started 11/28, heparin drip stopped. Price check performed and acceptable for patient. Plan will be this dose for 21 days, then 20 mg daily for 6 months, then 10 mg indefinitely.     Pneumonia    Recent Labs     11/28/24  0501 11/29/24  0446 11/30/24  0619   WBC 3.20* 2.79* 3.54*     Recent Labs     11/28/24  0501 11/29/24  0446 11/30/24  0619   CREATININE 0.84 0.84 0.83   EGFR 68 68 69     Estimated Creatinine Clearance: 47 mL/min (by C-G formula based on SCr of 0.83 mg/dL).  No results for input(s): \"LACTICACID\" in the last 72 hours.    Recent Labs     11/29/24  0436   PROCALCITONI 0.21       Vitals:    11/30/24 0703   BP: 158/82   Pulse: 99   Resp: 17   Temp: 100.2 °F (37.9 °C)   SpO2: (!) 89%     Temperature: 100.2 °F (37.9 °C)  Temp Source: Oral  Results from last 7 days   Lab Units 11/24/24 2127   LEUKOCYTES UA  Trace*   NITRITE UA  Negative   GLUCOSE UA mg/dl Negative   KETONES UA mg/dl Trace*   BLOOD UA  Small*   WBC UA /hpf 4-10*   RBC UA /hpf 2-4*   BACTERIA UA /hpf None Seen     Recent Labs     11/29/24  1612   GRAMSTAIN 1+ Polys  No bacteria seen       CT pe study w abdomen pelvis w contrast  Result Date: 11/25/2024  Right upper lobe opacities consistent with pneumonia 4.  Mild pulmonary edema       DEFINITIONS   SIRS: two of the following that cannot be better explained by another cause  HR:>90/min  and WBC: <4x109/L (<4000/mm3), >12x109/L (>12,000/mm3), or =10% bands  SEPSIS  SIRS AND  Confirmed OR Suspected infection Lungs  qSOFA=0  Blood cultures negative at 72 hours  Procal continues to be negative  MRSA swab negative, vanc stopped 11/27    PLAN:  LABS:   Trend CBC and fever as markers of ongoing infection  Pneumococcal Antibody 23 ordered, to be followed up " outpatient. Ordered prevnar 20 vaccine her for which was administered on 11/27.  Bronchoscopy results so far: Bronchoalveolar lavage: 2% neutrophils. 58% lymphs, 40% macrophage, total count 100, bronchial gram stain 1+ polys, no bacteria seen    REASSESS DAILY: Reassess pt response to txt daily. Document improvement or worsening status. Patient had oxygen saturation as low as 86% on room air while awake on 11/28. It went as high as 91% but did not maintain. Ordered her nasal cannula oxygen to improve this to >90%, but it was never given. 11/29 patient saturating to low 90s on room air. Oxygen added 11/29.   Given tessalon perles PRN for cough.   Repeated chest x-ray today 11/29 which showed worsening right upper lobe pneumonia. Consulted pulmonology and had bronchoscopy 11/29. Pending tests: fungal culture, virus culture, legionella culture, aspergillus antigen, AFB culture with stain, pneumocystis PCR    ABX  Levoquin 750mg IV q48hrs (renal dose) Given one dose [id number], stopped and switched to ceftriaxone 1,000 mg Q24Hr on 11/25. Stopped 11/29.  Vanc 750mg IV q12hrs Started 23:00 [id number]. Stopped on 11/27 due to negative MRSA cx  Duration 7-10 days (11/30 is day 7)  Current regimen per pulm recs: cefepime and vanc started 11/29    NUTRITION:   Good nutrition and tight glucose management   No current benefit of IV Vit C  Neutropenia (HCC)      Component      Latest Ref Rng 2/16/2024 3/12/2024 4/5/2024 4/30/2024 5/20/2024   Absolute Neutrophils      1.85 - 7.62 Thousand/uL 6.06  5.47  4.95  5.71  5.87      Component      Latest Ref Rng 5/28/2024 6/3/2024 6/10/2024 6/18/2024 6/22/2024   Absolute Neutrophils      1.85 - 7.62 Thousand/uL 5.89  6.31  5.21  3.42  3.63      Component      Latest Ref Rng 6/23/2024 6/24/2024 7/1/2024 7/2/2024 7/3/2024 7/15/2024   Absolute Neutrophils      1.85 - 7.62 Thousand/uL 2.38  1.58 (L)  1.81 (L)  2.08  1.83 (L)  2.08      Component      Latest Ref Rng 7/29/2024 8/21/2024 8/22/2024  "8/23/2024 8/24/2024   Absolute Neutrophils      1.85 - 7.62 Thousand/uL 2.95  6.57  5.54  6.22  4.46      Component      Latest Ref Rng 9/9/2024 9/10/2024 9/11/2024 9/12/2024 9/13/2024   Absolute Neutrophils      1.85 - 7.62 Thousand/uL 5.00  3.22  5.80  4.26  7.84 (H)      Component      Latest Ref Rng 10/3/2024 10/16/2024 10/21/2024 10/22/2024 10/27/2024   Absolute Neutrophils      1.85 - 7.62 Thousand/uL 7.17  1.58 (L)  2.79  4.39  9.78 (H)      Component      Latest Ref Rng 11/11/2024 11/24/2024   Absolute Neutrophils      1.85 - 7.62 Thousand/uL 9.40 (H)  1.54 (L)       No results for input(s): \"AST\", \"ALT\", \"ALKPHOS\", \"TBILI\", \"BILIDIR\" in the last 72 hours.      Fever at home 101.3  Urine culture 11/24: < 10,000 cfu/mL mixed contaminants x2  PLAN:  Antibiotics cefepime and Vanc switched on 11/29 from ceftriaxone due to minimal improvement. Watch for reaction as patient has had rashes with keflex in her hx.   Oncology consulted  Neutropenic precautions  Sepsis, unspecified organism (HCC)  Patient reached sepsis criteria AM of 11/26 due to fevers, elevated wbc count, and HR >90. LA ordered and normal. Procal continues to be normal. Will continue to monitor with antibiotics for penumonia, and heparin drip for PE. Will continue to get daily labs.   AM of 11/29: patient no longer septic due to no fevers in the past 24 hours, but still having mild temperature elevations and Hrs in the 90s. Will monitor.   Acute hypoxic respiratory failure (HCC)  Patient requiring oxygen 2 L via nasal cannula currently. Oxygen needs likely due to the setting of pneumonia and pulmonary embolism. Also had recent bronchoscopy which can increase oxygen needs initially afterwards.     VTE Pharmacologic Prophylaxis: VTE Score: 9 High Risk (Score >/= 5) - Pharmacological DVT Prophylaxis Ordered: rivaroxaban (Xarelto). Sequential Compression Devices Ordered.    Mobility:   Basic Mobility Inpatient Raw Score: 24  -[facility] Goal: 8: Walk 250 " feet or more  JH-HLM Achieved: 7: Walk 25 feet or more  JH-HLM Goal NOT achieved. Continue with multidisciplinary rounding and encourage appropriate mobility to improve upon JH-HLM goals.    Patient Centered Rounds: I performed bedside rounds with nursing staff today.   Discussions with Specialists or Other Care Team Provider: pulm, nursing, case management, attending    Education and Discussions with Family / Patient: Updated  (daughter) at bedside.    Current Length of Stay: 5 day(s)  Current Patient Status: Inpatient   Certification Statement: The patient will continue to require additional inpatient hospital stay due to treatment for pneumonia and PE  Discharge Plan: Anticipate discharge in 24-48 hrs to home.    Code Status: Level 1 - Full Code    Subjective   Patient seen at bedside this morning with daughter Sue present. Patient tolerated bronchoscopy well yesterday. She does feel some more shortness of breath today but I reassured her that this is normal after that procedure. I answered questions and concerns regarding the sepsis that was mentioned prior. Patient and daughter aware that broader antibiotics were started yesterday.     Objective :  Temp:  [96.7 °F (35.9 °C)-100.2 °F (37.9 °C)] 100.2 °F (37.9 °C)  HR:  [74-99] 99  BP: (130-165)/(74-96) 158/82  Resp:  [16-20] 17  SpO2:  [89 %-97 %] 89 %  O2 Device: Nasal cannula  Nasal Cannula O2 Flow Rate (L/min):  [2 L/min-6 L/min] 2 L/min    Body mass index is 20.85 kg/m².     Input and Output Summary (last 24 hours):     Intake/Output Summary (Last 24 hours) at 11/30/2024 0850  Last data filed at 11/30/2024 0742  Gross per 24 hour   Intake 1930 ml   Output 250 ml   Net 1680 ml       Physical Exam  Constitutional:       General: She is not in acute distress.     Appearance: Normal appearance. She is not ill-appearing.   HENT:      Head: Normocephalic and atraumatic.      Right Ear: External ear normal.      Left Ear: External ear normal.       Nose: Nose normal.      Mouth/Throat:      Mouth: Mucous membranes are moist.      Pharynx: Oropharynx is clear.   Eyes:      Conjunctiva/sclera: Conjunctivae normal.   Cardiovascular:      Rate and Rhythm: Regular rhythm.      Pulses: Normal pulses.      Heart sounds: Normal heart sounds.      Comments: Hrs 100s-110s  Pulmonary:      Effort: Pulmonary effort is normal.      Breath sounds: Normal breath sounds.      Comments: Nasal cannula in place at 2 L saturating at 92%  Abdominal:      General: Abdomen is flat. Bowel sounds are normal. There is no distension.      Palpations: Abdomen is soft.      Tenderness: There is no abdominal tenderness.   Musculoskeletal:         General: No swelling.   Skin:     General: Skin is warm and dry.      Capillary Refill: Capillary refill takes less than 2 seconds.   Neurological:      General: No focal deficit present.      Mental Status: She is alert and oriented to person, place, and time.   Psychiatric:         Mood and Affect: Mood normal.         Behavior: Behavior normal.         Lab Results: I have reviewed the following results:   Results from last 7 days   Lab Units 11/30/24  0619 11/29/24  0446 11/28/24  0501   WBC Thousand/uL 3.54*   < > 3.20*   HEMOGLOBIN g/dL 8.6*   < > 7.3*   HEMATOCRIT % 27.3*   < > 22.6*   PLATELETS Thousands/uL 266   < > 190   BANDS PCT % 3   < >  --    SEGS PCT %  --   --  76*   LYMPHO PCT % 5*   < > 5*   MONO PCT % 3*   < > 8   EOS PCT % 2   < > 2    < > = values in this interval not displayed.     Results from last 7 days   Lab Units 11/30/24  0619 11/25/24  0732 11/24/24  1930   SODIUM mmol/L 135   < > 139   POTASSIUM mmol/L 4.3   < > 3.8   CHLORIDE mmol/L 104   < > 105   CO2 mmol/L 20*   < > 23   BUN mg/dL 16   < > 22   CREATININE mg/dL 0.83   < > 0.99   ANION GAP mmol/L 11   < > 11   CALCIUM mg/dL 8.7   < > 9.1   ALBUMIN g/dL  --   --  3.8   TOTAL BILIRUBIN mg/dL  --   --  0.27   ALK PHOS U/L  --   --  41   ALT U/L  --   --  12   AST U/L   --   --  10*   GLUCOSE RANDOM mg/dL 86   < > 129    < > = values in this interval not displayed.     Results from last 7 days   Lab Units 11/24/24  1930   INR  0.91             Results from last 7 days   Lab Units 11/29/24  0436 11/26/24  0928 11/26/24  0515 11/25/24  0732 11/24/24  2237 11/24/24 2127   LACTIC ACID mmol/L  --  1.2  --   --  1.4  --    PROCALCITONIN ng/ml 0.21  --  0.15 0.10  --  0.12       Recent Cultures (last 7 days):   Results from last 7 days   Lab Units 11/29/24  1612 11/24/24  2233 11/24/24 2127   BLOOD CULTURE   --  No Growth After 5 Days.  No Growth After 5 Days.  --    GRAM STAIN RESULT  1+ Polys  No bacteria seen  --   --    URINE CULTURE   --   --  <10,000 cfu/ml       Imaging Results Review: I personally reviewed the following image studies in PACS and associated radiology reports: chest xray. My interpretation of the radiology images/reports is: see reports.  Other Study Results Review: Other studies reviewed include: bronchoscopy    Last 24 Hours Medication List:     Current Facility-Administered Medications:     acetaminophen (TYLENOL) tablet 650 mg, Q6H KRISS    aluminum-magnesium hydroxide-simethicone (MAALOX) oral suspension 30 mL, Q6H PRN    benzonatate (TESSALON PERLES) capsule 200 mg, TID PRN    ceFEPime (MAXIPIME) 2,000 mg in dextrose 5 % 50 mL IVPB, Q12H, Last Rate: 2,000 mg (11/30/24 0607)    cyanocobalamin (VITAMIN B-12) tablet 1,000 mcg, Daily    dexamethasone (DECADRON) tablet 2 mg, Daily    folic acid (FOLVITE) tablet 1 mg, Daily    gabapentin (NEURONTIN) capsule 100 mg, BID before breakfast/lunch    gabapentin (NEURONTIN) capsule 300 mg, HS    ibuprofen (MOTRIN) tablet 400 mg, Q6H PRN    lactated ringers infusion, Continuous, Last Rate: 100 mL/hr (11/30/24 0606)    lactobacillus acidophilus-bulgaricus (FLORANEX) packet 1 packet, Daily    levETIRAcetam (KEPPRA) tablet 1,000 mg, Q12H KRISS    levothyroxine tablet 50 mcg, Early Morning    LORazepam (ATIVAN) injection 1 mg,  Q8H PRN    LORazepam (ATIVAN) injection 2 mg, Q8H PRN    ondansetron (ZOFRAN) injection 4 mg, Q6H PRN    pantoprazole (PROTONIX) EC tablet 40 mg, Early Morning    rivaroxaban (XARELTO) tablet 15 mg, BID With Meals    senna (SENOKOT) tablet 8.6 mg, HS PRN    vancomycin (VANCOCIN) 1,250 mg in sodium chloride 0.9 % 250 mL IVPB, Q24H    Facility-Administered Medications Ordered in Other Encounters:     fentaNYL injection, PRN    midazolam (VERSED) injection, PRN    Administrative Statements   Today, Patient Was Seen By: Fabby Monzon, DO  I have spent a total time of 60 minutes in caring for this patient on the day of the visit/encounter including Diagnostic results, Prognosis, Risks and benefits of tx options, Instructions for management, Patient and family education, Importance of tx compliance, Risk factor reductions, Impressions, Counseling / Coordination of care, Documenting in the medical record, Reviewing / ordering tests, medicine, procedures  , Obtaining or reviewing history  , and Communicating with other healthcare professionals .    **Please Note: This note may have been constructed using a voice recognition system.**

## 2024-11-30 NOTE — PROGRESS NOTES
Ayla Nye is a 74 y.o. female who is currently ordered Vancomycin IV with management by the Pharmacy Consult service.  Relevant clinical data and objective / subjective history reviewed.  Vancomycin Assessment:  Indication and Goal AUC/Trough: Pneumonia (goal -600, trough >10)  Clinical Status:  new  Micro:     Renal Function:  SCr: 0.84 mg/dL  CrCl: 40.6 mL/min  Renal replacement: Not on dialysis  Days of Therapy: 1  Current Dose: 1250 mg every 24 hours  Vancomycin Plan:  New Dosing: continue current dose  Estimated AUC: 537 mcg*hr/mL  Estimated Trough: 12.7 mcg/mL  Next Level: 12/1 @ 0600  Renal Function Monitoring: Daily BMP and UOP  Pharmacy will continue to follow closely for s/sx of nephrotoxicity, infusion reactions and appropriateness of therapy.  BMP and CBC will be ordered per protocol. We will continue to follow the patient’s culture results and clinical progress daily.    Rosa Andujar, Pharmacist

## 2024-11-30 NOTE — ASSESSMENT & PLAN NOTE
PERC 2  WELLS 2.5  PESI 104 Class III Intermediate risk   [id number]: CT pe study w abdomen pelvis w contrast  PULMONARY ARTERIAL TREE: Embolism straddles between the middle lobe and right lower lobe basal segment arteries, as seen on series 301/112, this is new from the most recent prior.   Additional embolism noted within the left lower lobe basal arteries straddling into the lower lingular segment, as noted on series 301/, also new from prior.  RV diameter at the level of the AV valve = 3. 9 cm  LV diameter at the level of the AV valve = 2.7 cm  Ratio equals 1.4  CLASSIFICATION Acute hemodynamically stable bilateral right sided segmental left basilar associated with SOB most likely provoked from cancer hx while on immunotherapy   ECHO : EF 65%, diastolic dysfunction grade I. Mitral valve has mild annular calcification and tricuspid valve has mild regurgitation.     PLAN  Xarelto started , heparin drip stopped. Price check performed and acceptable for patient. Plan will be this dose for 21 days, then 20 mg daily for 6 months, then 10 mg indefinitely.

## 2024-11-30 NOTE — ASSESSMENT & PLAN NOTE
Blood Pressure: 158/82  Recent Labs     11/29/24  0851 11/29/24  1809 11/30/24  0619   HGB 7.0* 9.2* 8.6*   BL 8-11    Plan:  -monitor, on Xarelto.   - on AM of 11/29, patient had hemoglobin of 6.6. no coughing up blood, or blood in stool or urine. Only slight bleeding with blowing her nose that she notes. Prepared and gave 1 unit of leukoreduced PRBCs. Repeat H&H afterwards was 9.2.  Transfuse if Hb < 7

## 2024-11-30 NOTE — ASSESSMENT & PLAN NOTE
"  Recent Labs     11/28/24  0501 11/29/24  0446 11/30/24  0619   WBC 3.20* 2.79* 3.54*     Recent Labs     11/28/24  0501 11/29/24  0446 11/30/24  0619   CREATININE 0.84 0.84 0.83   EGFR 68 68 69     Estimated Creatinine Clearance: 47 mL/min (by C-G formula based on SCr of 0.83 mg/dL).  No results for input(s): \"LACTICACID\" in the last 72 hours.    Recent Labs     11/29/24  0436   PROCALCITONI 0.21       Vitals:    11/30/24 0703   BP: 158/82   Pulse: 99   Resp: 17   Temp: 100.2 °F (37.9 °C)   SpO2: (!) 89%     Temperature: 100.2 °F (37.9 °C)  Temp Source: Oral  Results from last 7 days   Lab Units 11/24/24  2127   LEUKOCYTES UA  Trace*   NITRITE UA  Negative   GLUCOSE UA mg/dl Negative   KETONES UA mg/dl Trace*   BLOOD UA  Small*   WBC UA /hpf 4-10*   RBC UA /hpf 2-4*   BACTERIA UA /hpf None Seen     Recent Labs     11/29/24  1612   GRAMSTAIN 1+ Polys  No bacteria seen       CT pe study w abdomen pelvis w contrast  Result Date: 11/25/2024  Right upper lobe opacities consistent with pneumonia 4.  Mild pulmonary edema       DEFINITIONS   SIRS: two of the following that cannot be better explained by another cause  HR:>90/min  and WBC: <4x109/L (<4000/mm3), >12x109/L (>12,000/mm3), or =10% bands  SEPSIS  SIRS AND  Confirmed OR Suspected infection Lungs  qSOFA=0  Blood cultures negative at 72 hours  Procal continues to be negative  MRSA swab negative, vanc stopped 11/27    PLAN:  LABS:   Trend CBC and fever as markers of ongoing infection  Pneumococcal Antibody 23 ordered, to be followed up outpatient. Ordered prevnar 20 vaccine her for which was administered on 11/27.  Bronchoscopy results so far: Bronchoalveolar lavage: 2% neutrophils. 58% lymphs, 40% macrophage, total count 100, bronchial gram stain 1+ polys, no bacteria seen    REASSESS DAILY: Reassess pt response to txt daily. Document improvement or worsening status. Patient had oxygen saturation as low as 86% on room air while awake on 11/28. It went as high as " 91% but did not maintain. Ordered her nasal cannula oxygen to improve this to >90%, but it was never given. 11/29 patient saturating to low 90s on room air. Oxygen added 11/29.   Given tessalon perles PRN for cough.   Repeated chest x-ray today 11/29 which showed worsening right upper lobe pneumonia. Consulted pulmonology and had bronchoscopy 11/29. Pending tests: fungal culture, virus culture, legionella culture, aspergillus antigen, AFB culture with stain, pneumocystis PCR    ABX  Levoquin 750mg IV q48hrs (renal dose) Given one dose [id number], stopped and switched to ceftriaxone 1,000 mg Q24Hr on 11/25. Stopped 11/29.  Vanc 750mg IV q12hrs Started 23:00 24NOV24. Stopped on 11/27 due to negative MRSA cx  Duration 7-10 days (11/30 is day 7)  Current regimen per pulm recs: cefepime and vanc started 11/29    NUTRITION:   Good nutrition and tight glucose management   No current benefit of IV Vit C

## 2024-11-30 NOTE — ASSESSMENT & PLAN NOTE
Patient requiring oxygen 2 L via nasal cannula currently. Oxygen needs likely due to the setting of pneumonia and pulmonary embolism. Also had recent bronchoscopy which can increase oxygen needs initially afterwards.

## 2024-11-30 NOTE — PLAN OF CARE
Problem: Potential for Falls  Goal: Patient will remain free of falls  Description: INTERVENTIONS:  - Educate patient/family on patient safety including physical limitations  - Instruct patient to call for assistance with activity   - Consult OT/PT to assist with strengthening/mobility   - Keep Call bell within reach  - Keep bed low and locked with side rails adjusted as appropriate  - Keep care items and personal belongings within reach  - Initiate and maintain comfort rounds  - Make Fall Risk Sign visible to staff  - Apply yellow socks and bracelet for high fall risk patients  - Consider moving patient to room near nurses station  Outcome: Progressing     Problem: INFECTION - ADULT  Goal: Absence or prevention of progression during hospitalization  Description: INTERVENTIONS:  - Assess and monitor for signs and symptoms of infection  - Monitor lab/diagnostic results  - Monitor all insertion sites, i.e. indwelling lines, tubes, and drains  - Monitor endotracheal if appropriate and nasal secretions for changes in amount and color  - [place] appropriate cooling/warming therapies per order  - Administer medications as ordered  - Instruct and encourage patient and family to use good hand hygiene technique  - Identify and instruct in appropriate isolation precautions for identified infection/condition  Outcome: Progressing     Problem: SAFETY ADULT  Goal: Patient will remain free of falls  Description: INTERVENTIONS:  - Educate patient/family on patient safety including physical limitations  - Instruct patient to call for assistance with activity   - Consult OT/PT to assist with strengthening/mobility   - Keep Call bell within reach  - Keep bed low and locked with side rails adjusted as appropriate  - Keep care items and personal belongings within reach  - Initiate and maintain comfort rounds  - Make Fall Risk Sign visible to staff  - Apply yellow socks and bracelet for high fall risk patients  - Consider moving  patient to room near nurses station  Outcome: Progressing     Problem: Knowledge Deficit  Goal: Patient/family/caregiver demonstrates understanding of disease process, treatment plan, medications, and discharge instructions  Description: Complete learning assessment and assess knowledge base.  Interventions:  - Provide teaching at level of understanding  - Provide teaching via preferred learning methods  Outcome: Progressing     Problem: RESPIRATORY - ADULT  Goal: Achieves optimal ventilation and oxygenation  Description: INTERVENTIONS:  - Assess for changes in respiratory status  - Assess for changes in mentation and behavior  - Position to facilitate oxygenation and minimize respiratory effort  - Oxygen administered by appropriate delivery if ordered  - Initiate smoking cessation education as indicated  - Encourage broncho-pulmonary hygiene including cough, deep breathe, Incentive Spirometry  - Assess the need for suctioning and aspirate as needed  - Assess and instruct to report SOB or any respiratory difficulty  - Respiratory Therapy support as indicated  Outcome: Progressing

## 2024-12-01 ENCOUNTER — APPOINTMENT (INPATIENT)
Dept: RADIOLOGY | Facility: HOSPITAL | Age: 74
DRG: 871 | End: 2024-12-01
Payer: MEDICARE

## 2024-12-01 LAB
ANION GAP SERPL CALCULATED.3IONS-SCNC: 11 MMOL/L (ref 4–13)
BACTERIA BRONCH AEROBE CULT: NO GROWTH
BNP SERPL-MCNC: 96 PG/ML (ref 0–100)
BUN SERPL-MCNC: 13 MG/DL (ref 5–25)
CALCIUM SERPL-MCNC: 9.6 MG/DL (ref 8.4–10.2)
CHLORIDE SERPL-SCNC: 102 MMOL/L (ref 96–108)
CO2 SERPL-SCNC: 27 MMOL/L (ref 21–32)
CREAT SERPL-MCNC: 0.84 MG/DL (ref 0.6–1.3)
CRP SERPL QL: 170.2 MG/L
DEPRECATED S PNEUM14 IGG SER-MCNC: 0.2 UG/ML
DEPRECATED S PNEUM19 IGG SER-MCNC: 0.5 UG/ML
DEPRECATED S PNEUM23 IGG SER-MCNC: <0.1 UG/ML
DEPRECATED S PNEUM3 IGG SER-MCNC: 0.4 UG/ML
DEPRECATED S PNEUM4 IGG SER-MCNC: 2 UG/ML
DEPRECATED S PNEUM8 AB SER-MCNC: 17.2 UG/ML
DEPRECATED S PNEUM9 IGG SER-MCNC: 1.7 UG/ML
ERYTHROCYTE [DISTWIDTH] IN BLOOD BY AUTOMATED COUNT: 16.5 % (ref 11.6–15.1)
FLUBV RNA SPEC QL NAA+PROBE: <0.1 UG/ML
GFR SERPL CREATININE-BSD FRML MDRD: 68 ML/MIN/1.73SQ M
GLUCOSE SERPL-MCNC: 86 MG/DL (ref 65–140)
GRAM STN SPEC: NORMAL
GRAM STN SPEC: NORMAL
HCT VFR BLD AUTO: 29 % (ref 34.8–46.1)
HGB BLD-MCNC: 9.3 G/DL (ref 11.5–15.4)
MCH RBC QN AUTO: 31.2 PG (ref 26.8–34.3)
MCHC RBC AUTO-ENTMCNC: 32.1 G/DL (ref 31.4–37.4)
MCV RBC AUTO: 97 FL (ref 82–98)
PLATELET # BLD AUTO: 328 THOUSANDS/UL (ref 149–390)
PMV BLD AUTO: 9.1 FL (ref 8.9–12.7)
POTASSIUM SERPL-SCNC: 3.7 MMOL/L (ref 3.5–5.3)
PROCALCITONIN SERPL-MCNC: 0.18 NG/ML
RBC # BLD AUTO: 2.98 MILLION/UL (ref 3.81–5.12)
S PNEUM DA 18C IGG SER-MCNC: 0.3 UG/ML
S PNEUM DA 19A IGG SER-MCNC: 3.4 UG/ML
S PNEUM DA 6B IGG SER-MCNC: <0.1 UG/ML
SL AMB PNEUMO AB TYPE 17 (17F): 0.9 UG/ML
SL AMB PNEUMO AB TYPE 20: 3.1 UG/ML
SL AMB PNEUMO AB TYPE 22 (22F): <0.1 UG/ML
SL AMB PNEUMO AB TYPE 2: 1.8 UG/ML
SL AMB PNEUMO AB TYPE 34 (10A): <0.1 UG/ML
SL AMB PNEUMO AB TYPE 43 (11A): <0.1 UG/ML
SL AMB PNEUMO AB TYPE 54 (15B): 0.5 UG/ML
SL AMB PNEUMO AB TYPE 5: <0.1 UG/ML
SL AMB PNEUMO AB TYPE 70 (33F): 1.2 UG/ML
SODIUM SERPL-SCNC: 140 MMOL/L (ref 135–147)
STREP PNEUMO TYPE 1: 2.9 UG/ML
STREP PNEUMO TYPE 51: 0.2 UG/ML
STREP PNEUMO TYPE 68: 1.7 UG/ML
VANCOMYCIN SERPL-MCNC: 13.8 UG/ML (ref 10–20)
WBC # BLD AUTO: 3.48 THOUSAND/UL (ref 4.31–10.16)

## 2024-12-01 PROCEDURE — 86140 C-REACTIVE PROTEIN: CPT

## 2024-12-01 PROCEDURE — 87040 BLOOD CULTURE FOR BACTERIA: CPT

## 2024-12-01 PROCEDURE — 80048 BASIC METABOLIC PNL TOTAL CA: CPT

## 2024-12-01 PROCEDURE — 85027 COMPLETE CBC AUTOMATED: CPT

## 2024-12-01 PROCEDURE — 80202 ASSAY OF VANCOMYCIN: CPT

## 2024-12-01 PROCEDURE — 85007 BL SMEAR W/DIFF WBC COUNT: CPT

## 2024-12-01 PROCEDURE — 99232 SBSQ HOSP IP/OBS MODERATE 35: CPT | Performed by: FAMILY MEDICINE

## 2024-12-01 PROCEDURE — 84145 PROCALCITONIN (PCT): CPT

## 2024-12-01 PROCEDURE — 71045 X-RAY EXAM CHEST 1 VIEW: CPT

## 2024-12-01 PROCEDURE — 83880 ASSAY OF NATRIURETIC PEPTIDE: CPT

## 2024-12-01 RX ORDER — SODIUM CHLORIDE, SODIUM LACTATE, POTASSIUM CHLORIDE, CALCIUM CHLORIDE 600; 310; 30; 20 MG/100ML; MG/100ML; MG/100ML; MG/100ML
75 INJECTION, SOLUTION INTRAVENOUS CONTINUOUS
Status: DISCONTINUED | OUTPATIENT
Start: 2024-12-01 | End: 2024-12-02

## 2024-12-01 RX ADMIN — ACETAMINOPHEN 650 MG: 325 TABLET, FILM COATED ORAL at 17:27

## 2024-12-01 RX ADMIN — ACETAMINOPHEN 650 MG: 325 TABLET, FILM COATED ORAL at 11:47

## 2024-12-01 RX ADMIN — LEVETIRACETAM 1000 MG: 250 TABLET, FILM COATED ORAL at 09:06

## 2024-12-01 RX ADMIN — FOLIC ACID 1 MG: 1 TABLET ORAL at 09:06

## 2024-12-01 RX ADMIN — RIVAROXABAN 15 MG: 15 TABLET, FILM COATED ORAL at 17:27

## 2024-12-01 RX ADMIN — GABAPENTIN 100 MG: 100 CAPSULE ORAL at 11:47

## 2024-12-01 RX ADMIN — LEVOTHYROXINE SODIUM 50 MCG: 0.05 TABLET ORAL at 05:35

## 2024-12-01 RX ADMIN — ACETAMINOPHEN 650 MG: 325 TABLET, FILM COATED ORAL at 05:35

## 2024-12-01 RX ADMIN — LEVETIRACETAM 1000 MG: 250 TABLET, FILM COATED ORAL at 21:46

## 2024-12-01 RX ADMIN — GABAPENTIN 100 MG: 100 CAPSULE ORAL at 06:04

## 2024-12-01 RX ADMIN — GABAPENTIN 300 MG: 300 CAPSULE ORAL at 21:46

## 2024-12-01 RX ADMIN — CYANOCOBALAMIN TAB 500 MCG 1000 MCG: 500 TAB at 09:06

## 2024-12-01 RX ADMIN — ACETAMINOPHEN 650 MG: 325 TABLET, FILM COATED ORAL at 23:01

## 2024-12-01 RX ADMIN — CEFEPIME 2000 MG: 2 INJECTION, POWDER, FOR SOLUTION INTRAVENOUS at 05:35

## 2024-12-01 RX ADMIN — DEXAMETHASONE 2 MG: 2 TABLET ORAL at 09:06

## 2024-12-01 RX ADMIN — RIVAROXABAN 15 MG: 15 TABLET, FILM COATED ORAL at 09:06

## 2024-12-01 RX ADMIN — PANTOPRAZOLE SODIUM 40 MG: 40 TABLET, DELAYED RELEASE ORAL at 05:35

## 2024-12-01 RX ADMIN — SODIUM CHLORIDE, SODIUM LACTATE, POTASSIUM CHLORIDE, AND CALCIUM CHLORIDE 100 ML/HR: .6; .31; .03; .02 INJECTION, SOLUTION INTRAVENOUS at 16:06

## 2024-12-01 RX ADMIN — CEFEPIME 2000 MG: 2 INJECTION, POWDER, FOR SOLUTION INTRAVENOUS at 17:29

## 2024-12-01 RX ADMIN — SODIUM CHLORIDE 1000 MG: 0.9 INJECTION, SOLUTION INTRAVENOUS at 10:14

## 2024-12-01 NOTE — ASSESSMENT & PLAN NOTE
PERC 2  WELLS 2.5  PESI 104 Class III Intermediate risk   [id number]: CT pe study w abdomen pelvis w contrast  PULMONARY ARTERIAL TREE: Embolism straddles between the middle lobe and right lower lobe basal segment arteries, as seen on series 301/112, this is new from the most recent prior.   Additional embolism noted within the left lower lobe basal arteries straddling into the lower lingular segment, as noted on series 301/, also new from prior.  RV diameter at the level of the AV valve = 3. 9 cm  LV diameter at the level of the AV valve = 2.7 cm  Ratio equals 1.4  CLASSIFICATION Acute hemodynamically stable bilateral right sided segmental left basilar associated with SOB most likely provoked from cancer hx while on immunotherapy   ECHO : EF 65%, diastolic dysfunction grade I. Mitral valve has mild annular calcification and tricuspid valve has mild regurgitation.     PLAN  Xarelto started , heparin drip stopped. Price check performed and acceptable for patient. Plan will be this dose for 21 days, then 20 mg daily for 6 months, then 10 mg indefinitely.

## 2024-12-01 NOTE — PROGRESS NOTES
Ayla Nye is a 74 y.o. female who is currently ordered Vancomycin IV with management by the Pharmacy Consult service.  Relevant clinical data and objective / subjective history reviewed.  Vancomycin Assessment:  Indication and Goal AUC/Trough: Pneumonia (goal -600, trough >10)  Micro:     Renal Function:  SCr: 0.84 mg/dL  CrCl: 46.4 mL/min  Renal replacement: Not on dialysis  Days of Therapy: 3  Current Dose: 1250 mg IV every 24 hours  Vancomycin Plan:  New Dosin mg IV every 24 hours  Estimated AUC: 484 mcg*hr/mL  Estimated Trough: 11.9 mcg/mL  Next Level:  at 0600  Renal Function Monitoring: Daily BMP and UOP  Pharmacy will continue to follow closely for s/sx of nephrotoxicity, infusion reactions and appropriateness of therapy.  BMP and CBC will be ordered per protocol. We will continue to follow the patient’s culture results and clinical progress daily.    Geovanna Amos, Pharmacist

## 2024-12-01 NOTE — PLAN OF CARE
Problem: Potential for Falls  Goal: Patient will remain free of falls  Description: INTERVENTIONS:  - Educate patient/family on patient safety including physical limitations  - Instruct patient to call for assistance with activity   - Consult OT/PT to assist with strengthening/mobility   - Keep Call bell within reach  - Keep bed low and locked with side rails adjusted as appropriate  - Keep care items and personal belongings within reach  - Initiate and maintain comfort rounds  - Make Fall Risk Sign visible to staff  - Offer Toileting every 2 Hours, in advance of need  - Initiate/Maintain BED/CHAIR alarm  - Obtain necessary fall risk management equipment: 2  - Apply yellow socks and bracelet for high fall risk patients  - Consider moving patient to room near nurses station  Outcome: Progressing     Problem: INFECTION - ADULT  Goal: Absence or prevention of progression during hospitalization  Description: INTERVENTIONS:  - Assess and monitor for signs and symptoms of infection  - Monitor lab/diagnostic results  - Monitor all insertion sites, i.e. indwelling lines, tubes, and drains  - Monitor endotracheal if appropriate and nasal secretions for changes in amount and color  - [place] appropriate cooling/warming therapies per order  - Administer medications as ordered  - Instruct and encourage patient and family to use good hand hygiene technique  - Identify and instruct in appropriate isolation precautions for identified infection/condition  Outcome: Progressing     Problem: SAFETY ADULT  Goal: Patient will remain free of falls  Description: INTERVENTIONS:  - Educate patient/family on patient safety including physical limitations  - Instruct patient to call for assistance with activity   - Consult OT/PT to assist with strengthening/mobility   - Keep Call bell within reach  - Keep bed low and locked with side rails adjusted as appropriate  - Keep care items and personal belongings within reach  - Initiate and  maintain comfort rounds  - Make Fall Risk Sign visible to staff  - Offer Toileting every 2 Hours, in advance of need  - Initiate/Maintain 2alarm  - Obtain necessary fall risk management equipment: 2  - Apply yellow socks and bracelet for high fall risk patients  - Consider moving patient to room near nurses station  Outcome: Progressing     Problem: Knowledge Deficit  Goal: Patient/family/caregiver demonstrates understanding of disease process, treatment plan, medications, and discharge instructions  Description: Complete learning assessment and assess knowledge base.  Interventions:  - Provide teaching at level of understanding  - Provide teaching via preferred learning methods  Outcome: Progressing     Problem: RESPIRATORY - ADULT  Goal: Achieves optimal ventilation and oxygenation  Description: INTERVENTIONS:  - Assess for changes in respiratory status  - Assess for changes in mentation and behavior  - Position to facilitate oxygenation and minimize respiratory effort  - Oxygen administered by appropriate delivery if ordered  - Initiate smoking cessation education as indicated  - Encourage broncho-pulmonary hygiene including cough, deep breathe, Incentive Spirometry  - Assess the need for suctioning and aspirate as needed  - Assess and instruct to report SOB or any respiratory difficulty  - Respiratory Therapy support as indicated  Outcome: Progressing     Problem: HEMATOLOGIC - ADULT  Goal: Maintains hematologic stability  Description: INTERVENTIONS  - Assess for signs and symptoms of bleeding or hemorrhage  - Monitor labs  - Administer supportive blood products/factors as ordered and appropriate  Outcome: Progressing

## 2024-12-01 NOTE — ASSESSMENT & PLAN NOTE
"  Recent Labs     11/29/24  0446 11/30/24  0619 12/01/24  0357   WBC 2.79* 3.54* 3.48*     Recent Labs     11/29/24  0446 11/30/24  0619 12/01/24  0357   CREATININE 0.84 0.83 0.84   EGFR 68 69 68     Estimated Creatinine Clearance: 46.5 mL/min (by C-G formula based on SCr of 0.84 mg/dL).  No results for input(s): \"LACTICACID\" in the last 72 hours.    Recent Labs     11/29/24  0436 12/01/24  0357   PROCALCITONI 0.21 0.18       Vitals:    12/01/24 0720   BP: 141/83   Pulse: 94   Resp: 18   Temp:    SpO2: 93%     Temperature: 99.7 °F (37.6 °C)  Temp Source: Oral  Results from last 7 days   Lab Units 11/24/24 2127   LEUKOCYTES UA  Trace*   NITRITE UA  Negative   GLUCOSE UA mg/dl Negative   KETONES UA mg/dl Trace*   BLOOD UA  Small*   WBC UA /hpf 4-10*   RBC UA /hpf 2-4*   BACTERIA UA /hpf None Seen     Recent Labs     11/29/24  1612   GRAMSTAIN 1+ Polys  No bacteria seen       CT pe study w abdomen pelvis w contrast  Result Date: 11/25/2024  Right upper lobe opacities consistent with pneumonia 4.  Mild pulmonary edema       DEFINITIONS   SIRS: two of the following that cannot be better explained by another cause  HR:>90/min  and WBC: <4x109/L (<4000/mm3), >12x109/L (>12,000/mm3), or =10% bands  SEPSIS  SIRS AND  Confirmed OR Suspected infection Lungs  qSOFA=0  Blood cultures negative at 72 hours  Procal continues to be negative  MRSA swab negative, vanc stopped 11/27    PLAN:  LABS:   Trend CBC and fever as markers of ongoing infection  Pneumococcal Antibody 23 ordered, to be followed up outpatient. Ordered prevnar 20 vaccine her for which was administered on 11/27.  Bronchoscopy results so far: Bronchoalveolar lavage: 2% neutrophils. 58% lymphs, 40% macrophage, total count 100, bronchial gram stain 1+ polys, no bacteria seen    REASSESS DAILY: Reassess pt response to txt daily. Document improvement or worsening status. Patient had oxygen saturation as low as 86% on room air while awake on 11/28. It went as high as " 91% but did not maintain. Ordered her nasal cannula oxygen to improve this to >90%, but it was never given. 11/29 patient saturating to low 90s on room air. Oxygen added 11/29.   Given tessalon perles PRN for cough.   Repeated chest x-ray today 11/29 which showed worsening right upper lobe pneumonia. Consulted pulmonology and had bronchoscopy 11/29. Pending tests: fungal culture, virus culture, legionella culture, aspergillus antigen, AFB culture with stain, pneumocystis PCR    ABX  Levoquin 750mg IV q48hrs (renal dose) Given one dose 24NOV24, stopped and switched to ceftriaxone 1,000 mg Q24Hr on 11/25. Stopped 11/29.  Vanc 750mg IV q12hrs Started 23:00 24NOV24. Stopped on 11/27 due to negative MRSA cx  Duration 7-10 days (11/30 is day 7)  Current regimen per pulm recs: cefepime and vanc started 11/29    NUTRITION:   Good nutrition and tight glucose management   No current benefit of IV Vit C

## 2024-12-01 NOTE — PLAN OF CARE
Problem: INFECTION - ADULT  Goal: Absence or prevention of progression during hospitalization  Description: INTERVENTIONS:  - Assess and monitor for signs and symptoms of infection  - Monitor lab/diagnostic results  - Monitor all insertion sites, i.e. indwelling lines, tubes, and drains  - Monitor endotracheal if appropriate and nasal secretions for changes in amount and color  - [place] appropriate cooling/warming therapies per order  - Administer medications as ordered  - Instruct and encourage patient and family to use good hand hygiene technique  - Identify and instruct in appropriate isolation precautions for identified infection/condition  Outcome: Progressing     Problem: HEMATOLOGIC - ADULT  Goal: Maintains hematologic stability  Description: INTERVENTIONS  - Assess for signs and symptoms of bleeding or hemorrhage  - Monitor labs  - Administer supportive blood products/factors as ordered and appropriate  Outcome: Progressing     Problem: RESPIRATORY - ADULT  Goal: Achieves optimal ventilation and oxygenation  Description: INTERVENTIONS:  - Assess for changes in respiratory status  - Assess for changes in mentation and behavior  - Position to facilitate oxygenation and minimize respiratory effort  - Oxygen administered by appropriate delivery if ordered  - Initiate smoking cessation education as indicated  - Encourage broncho-pulmonary hygiene including cough, deep breathe, Incentive Spirometry  - Assess the need for suctioning and aspirate as needed  - Assess and instruct to report SOB or any respiratory difficulty  - Respiratory Therapy support as indicated  Outcome: Progressing

## 2024-12-01 NOTE — ASSESSMENT & PLAN NOTE
Patient reached sepsis criteria AM of 11/26 due to fevers, elevated wbc count, and HR >90. LA ordered and normal. Procal continues to be normal. Will continue to monitor with antibiotics for penumonia, and heparin drip for PE. Will continue to get daily labs.   AM of 11/29: patient no longer septic due to no fevers in the past 24 hours, but still having mild temperature elevations and Hrs in the 90s. Will monitor.   11/30-12/1: Max temp 100.2, tachycardic to 103, increasing O2 requirement to 4L. Continue to monitor.

## 2024-12-01 NOTE — ASSESSMENT & PLAN NOTE
Blood Pressure: 141/83  Recent Labs     11/29/24  1809 11/30/24  0619 12/01/24  0357   HGB 9.2* 8.6* 9.3*   BL 8-11    Plan:  -monitor, on Xarelto.   - on AM of 11/29, patient had hemoglobin of 6.6. no coughing up blood, or blood in stool or urine. Only slight bleeding with blowing her nose that she notes. Prepared and gave 1 unit of leukoreduced PRBCs. Repeat H&H afterwards was 9.2.  Transfuse if Hb < 7

## 2024-12-01 NOTE — QUICK NOTE
Pt meeting sepsis criteria on broad spectrum abx (cefepime and vancomycin). Discussion with pulmonology this morning recommended ID consult if evidence of worsening infection, considering patient's immunocompromised state. ID consult placed.     Pt hemodynamically stable. Will hold sepsis fluids considering heart failure. Monitor closely and consider fluids PRN.

## 2024-12-01 NOTE — PROGRESS NOTES
Progress Note - Hospitalist   Name: Ayla Nye 74 y.o. female I MRN: [id number]  Unit/Bed#: S -01 I Date of Admission: 2024   Date of Service: 2024 I Hospital Day: 6    Assessment & Plan  Malignant neoplasm of upper lobe, right bronchus or lung (HCC)  Stage III NSCLC, new onset brain mets 24  Pt is on immunotherapy ketruda and carboplatin last dose    Oncologist Dr. Castro with [facility].    Radiologist Dr. 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   - on AM of , patient had hemoglobin of 6.6. no coughing up blood, or blood in stool or urine. Only slight bleeding with blowing her nose that she notes. Prepared and gave 1 unit of leukoreduced PRBCs. Repeat H&H afterwards was 9.2.  Transfuse if Hb < 7      Acute pulmonary embolism (HCC)  PERC 2  WELLS 2.5  PESI 104 Class III Intermediate risk   Imagin: CT pe study w abdomen pelvis w contrast  PULMONARY ARTERIAL TREE: Embolism straddles between the middle lobe and right lower lobe basal segment arteries, as seen on series 301, this is new from the most recent prior.   Additional embolism noted within the left lower lobe basal arteries straddling into the lower lingular segment, as noted on series , also new from prior.  RV diameter at the level of the AV valve = 3. 9 cm  LV diameter at the level of the AV valve = 2.7 cm  Ratio equals 1.4  CLASSIFICATION Acute hemodynamically stable bilateral right sided segmental left basilar  "associated with SOB most likely provoked from cancer hx while on immunotherapy   ECHO 11/25: EF 65%, diastolic dysfunction grade I. Mitral valve has mild annular calcification and tricuspid valve has mild regurgitation.     PLAN  Xarelto started 11/28, heparin drip stopped. Price check performed and acceptable for patient. Plan will be this dose for 21 days, then 20 mg daily for 6 months, then 10 mg indefinitely.     Pneumonia    Recent Labs     11/29/24  0446 11/30/24  0619 12/01/24  0357   WBC 2.79* 3.54* 3.48*     Recent Labs     11/29/24  0446 11/30/24  0619 12/01/24  0357   CREATININE 0.84 0.83 0.84   EGFR 68 69 68     Estimated Creatinine Clearance: 46.5 mL/min (by C-G formula based on SCr of 0.84 mg/dL).  No results for input(s): \"LACTICACID\" in the last 72 hours.    Recent Labs     11/29/24  0436 12/01/24  0357   PROCALCITONI 0.21 0.18       Vitals:    12/01/24 0720   BP: 141/83   Pulse: 94   Resp: 18   Temp:    SpO2: 93%     Temperature: 99.7 °F (37.6 °C)  Temp Source: Oral  Results from last 7 days   Lab Units 11/24/24 2127   LEUKOCYTES UA  Trace*   NITRITE UA  Negative   GLUCOSE UA mg/dl Negative   KETONES UA mg/dl Trace*   BLOOD UA  Small*   WBC UA /hpf 4-10*   RBC UA /hpf 2-4*   BACTERIA UA /hpf None Seen     Recent Labs     11/29/24  1612   GRAMSTAIN 1+ Polys  No bacteria seen       CT pe study w abdomen pelvis w contrast  Result Date: 11/25/2024  Right upper lobe opacities consistent with pneumonia 4.  Mild pulmonary edema       DEFINITIONS   SIRS: two of the following that cannot be better explained by another cause  HR:>90/min  and WBC: <4x109/L (<4000/mm3), >12x109/L (>12,000/mm3), or =10% bands  SEPSIS  SIRS AND  Confirmed OR Suspected infection Lungs  qSOFA=0  Blood cultures negative at 72 hours  Procal continues to be negative  MRSA swab negative, vanc stopped 11/27    PLAN:  LABS:   Trend CBC and fever as markers of ongoing infection  Pneumococcal Antibody 23 ordered, to be followed up " outpatient. Ordered prevnar 20 vaccine her for which was administered on 11/27.  Bronchoscopy results so far: Bronchoalveolar lavage: 2% neutrophils. 58% lymphs, 40% macrophage, total count 100, bronchial gram stain 1+ polys, no bacteria seen    REASSESS DAILY: Reassess pt response to txt daily. Document improvement or worsening status. Patient had oxygen saturation as low as 86% on room air while awake on 11/28. It went as high as 91% but did not maintain. Ordered her nasal cannula oxygen to improve this to >90%, but it was never given. 11/29 patient saturating to low 90s on room air. Oxygen added 11/29.   Given tessalon perles PRN for cough.   Repeated chest x-ray today 11/29 which showed worsening right upper lobe pneumonia. Consulted pulmonology and had bronchoscopy 11/29. Pending tests: fungal culture, virus culture, legionella culture, aspergillus antigen, AFB culture with stain, pneumocystis PCR    ABX  Levoquin 750mg IV q48hrs (renal dose) Given one dose [id number], stopped and switched to ceftriaxone 1,000 mg Q24Hr on 11/25. Stopped 11/29.  Vanc 750mg IV q12hrs Started 23:00 [id number]. Stopped on 11/27 due to negative MRSA cx  Duration 7-10 days (11/30 is day 7)  Current regimen per pulm recs: cefepime and vanc started 11/29    NUTRITION:   Good nutrition and tight glucose management   No current benefit of IV Vit C  Neutropenia (HCC)      Component      Latest Ref Rng 2/16/2024 3/12/2024 4/5/2024 4/30/2024 5/20/2024   Absolute Neutrophils      1.85 - 7.62 Thousand/uL 6.06  5.47  4.95  5.71  5.87      Component      Latest Ref Rng 5/28/2024 6/3/2024 6/10/2024 6/18/2024 6/22/2024   Absolute Neutrophils      1.85 - 7.62 Thousand/uL 5.89  6.31  5.21  3.42  3.63      Component      Latest Ref Rng 6/23/2024 6/24/2024 7/1/2024 7/2/2024 7/3/2024 7/15/2024   Absolute Neutrophils      1.85 - 7.62 Thousand/uL 2.38  1.58 (L)  1.81 (L)  2.08  1.83 (L)  2.08      Component      Latest Ref Rng 7/29/2024 8/21/2024 8/22/2024  "8/23/2024 8/24/2024   Absolute Neutrophils      1.85 - 7.62 Thousand/uL 2.95  6.57  5.54  6.22  4.46      Component      Latest Ref Rng 9/9/2024 9/10/2024 9/11/2024 9/12/2024 9/13/2024   Absolute Neutrophils      1.85 - 7.62 Thousand/uL 5.00  3.22  5.80  4.26  7.84 (H)      Component      Latest Ref Rng 10/3/2024 10/16/2024 10/21/2024 10/22/2024 10/27/2024   Absolute Neutrophils      1.85 - 7.62 Thousand/uL 7.17  1.58 (L)  2.79  4.39  9.78 (H)      Component      Latest Ref Rng 11/11/2024 11/24/2024   Absolute Neutrophils      1.85 - 7.62 Thousand/uL 9.40 (H)  1.54 (L)       No results for input(s): \"AST\", \"ALT\", \"ALKPHOS\", \"TBILI\", \"BILIDIR\" in the last 72 hours.      Fever at home 101.3  Urine culture 11/24: < 10,000 cfu/mL mixed contaminants x2  PLAN:  Antibiotics cefepime and Vanc switched on 11/29 from ceftriaxone due to minimal improvement. Watch for reaction as patient has had rashes with keflex in her hx.   Oncology consulted  Neutropenic precautions  Sepsis, unspecified organism (HCC)  Patient reached sepsis criteria AM of 11/26 due to fevers, elevated wbc count, and HR >90. LA ordered and normal. Procal continues to be normal. Will continue to monitor with antibiotics for penumonia, and heparin drip for PE. Will continue to get daily labs.   AM of 11/29: patient no longer septic due to no fevers in the past 24 hours, but still having mild temperature elevations and Hrs in the 90s. Will monitor.   11/30-12/1: Max temp 100.2, tachycardic to 103, increasing O2 requirement to 4L. Continue to monitor.   Acute hypoxic respiratory failure (HCC)  Patient requiring oxygen 4 L via nasal cannula currently. Oxygen needs likely due to the setting of pneumonia and pulmonary embolism. Also had recent bronchoscopy which can increase oxygen needs initially afterwards.     VTE Pharmacologic Prophylaxis: VTE Score: 9 High Risk (Score >/= 5) - Pharmacological DVT Prophylaxis Ordered: rivaroxaban (Xarelto). Sequential " Compression Devices Ordered.    Mobility:   Basic Mobility Inpatient Raw Score: 24  JH-HLM Goal: 8: Walk 250 feet or more  JH-HLM Achieved: 6: Walk 10 steps or more  JH-HLM Goal achieved. Continue to encourage appropriate mobility.    Patient Centered Rounds: I performed bedside rounds with nursing staff today.   Discussions with Specialists or Other Care Team Provider: Attending Physician    Education and Discussions with Family / Patient:  Will call and update family later today.     Current Length of Stay: 6 day(s)  Current Patient Status: Inpatient   Certification Statement: sepsis   Discharge Plan: Anticipate discharge in 24-48 hrs to discharge location to be determined pending rehab evaluations.    Code Status: Level 1 - Full Code    Subjective   Patient seen and examined at bedside this morning. Patient sleeping comfortably in no acute distress.     Overnight, patient had increasing O2 requirements, now up to 4L. She also reported overall feeling worse and was noted to have increased HR > 90.    Nursing reports no acute concerns.      Objective :  Temp:  [97.5 °F (36.4 °C)-99.7 °F (37.6 °C)] 99.7 °F (37.6 °C)  HR:  [] 94  BP: (114-161)/(65-88) 141/83  Resp:  [16-18] 18  SpO2:  [93 %-96 %] 93 %  O2 Device: Nasal cannula  Nasal Cannula O2 Flow Rate (L/min):  [4 L/min] 4 L/min    Body mass index is 20.85 kg/m².     Input and Output Summary (last 24 hours):     Intake/Output Summary (Last 24 hours) at 12/1/2024 1107  Last data filed at 11/30/2024 2146  Gross per 24 hour   Intake 1000 ml   Output --   Net 1000 ml       Physical Exam  Vitals and nursing note reviewed.   Constitutional:       General: She is not in acute distress.     Appearance: Normal appearance.      Interventions: Nasal cannula in place.      Comments: 4L O2    HENT:      Head: Normocephalic and atraumatic.      Right Ear: External ear normal.      Left Ear: External ear normal.      Nose: Nose normal.      Mouth/Throat:      Mouth: Mucous  membranes are moist.      Pharynx: Oropharynx is clear.   Eyes:      Conjunctiva/sclera: Conjunctivae normal.   Cardiovascular:      Rate and Rhythm: Regular rhythm. Tachycardia present.      Pulses: Normal pulses.      Heart sounds: Normal heart sounds.   Pulmonary:      Effort: Pulmonary effort is normal.      Breath sounds: Normal breath sounds.   Abdominal:      General: Abdomen is flat. Bowel sounds are normal. There is no distension.      Palpations: Abdomen is soft.      Tenderness: There is no abdominal tenderness.   Musculoskeletal:         General: No swelling.   Skin:     General: Skin is warm and dry.      Capillary Refill: Capillary refill takes less than 2 seconds.   Neurological:      General: No focal deficit present.      Mental Status: She is alert and oriented to person, place, and time.   Psychiatric:         Mood and Affect: Mood normal.         Behavior: Behavior normal.       Lines/Drains:      Lab Results: I have reviewed the following results:   Results from last 7 days   Lab Units 12/01/24 0357 11/30/24  0619 11/29/24  0446 11/28/24  0501   WBC Thousand/uL 3.48* 3.54*   < > 3.20*   HEMOGLOBIN g/dL 9.3* 8.6*   < > 7.3*   HEMATOCRIT % 29.0* 27.3*   < > 22.6*   PLATELETS Thousands/uL 328 266   < > 190   BANDS PCT %  --  3   < >  --    SEGS PCT %  --   --   --  76*   LYMPHO PCT %  --  5*   < > 5*   MONO PCT %  --  3*   < > 8   EOS PCT %  --  2   < > 2    < > = values in this interval not displayed.     Results from last 7 days   Lab Units 12/01/24  0357 11/25/24  0732 11/24/24  1930   SODIUM mmol/L 140   < > 139   POTASSIUM mmol/L 3.7   < > 3.8   CHLORIDE mmol/L 102   < > 105   CO2 mmol/L 27   < > 23   BUN mg/dL 13   < > 22   CREATININE mg/dL 0.84   < > 0.99   ANION GAP mmol/L 11   < > 11   CALCIUM mg/dL 9.6   < > 9.1   ALBUMIN g/dL  --   --  3.8   TOTAL BILIRUBIN mg/dL  --   --  0.27   ALK PHOS U/L  --   --  41   ALT U/L  --   --  12   AST U/L  --   --  10*   GLUCOSE RANDOM mg/dL 86   < > 129     < > = values in this interval not displayed.     Results from last 7 days   Lab Units 11/24/24  1930   INR  0.91             Results from last 7 days   Lab Units 12/01/24  0357 11/29/24  0436 11/26/24  0928 11/26/24  0515 11/25/24  0732 11/24/24  2237 11/24/24 2127   LACTIC ACID mmol/L  --   --  1.2  --   --  1.4  --    PROCALCITONIN ng/ml 0.18 0.21  --  0.15 0.10  --  0.12       Recent Cultures (last 7 days):   Results from last 7 days   Lab Units 11/29/24  1612 11/24/24  2233 11/24/24 2127   BLOOD CULTURE   --  No Growth After 5 Days.  No Growth After 5 Days.  --    GRAM STAIN RESULT  1+ Polys  No bacteria seen  --   --    URINE CULTURE   --   --  <10,000 cfu/ml       Imaging Results Review: I reviewed radiology reports from this admission including: chest xray, CT chest, and CT head.    XR chest portable   Final Result by Yassine Mahoney DO (11/29 1139)      Slight worsening of right upper lobe airspace opacity, partially obscuring known right upper lobe mass.            Workstation performed: [id number]         CT pe study w abdomen pelvis w contrast   Final Result by Felipe Wood MD (11/25 0047)      1.  Bilateral pulmonary emboli   2.  The calculated ratio of right ventricular to left ventricular diameter (RV/LV ratio) is 1.4. This is greater than 0.9, which is abnormal and indicates right heart strain. An abnormal RV/LV ratio has been shown to be associated with an increased risk    of 30 day mortality in the setting of acute pulmonary embolism.   3.  Right upper lobe opacities consistent with pneumonia   4.  Mild pulmonary edema   5.  Stable right upper lobe pulmonary mass   6.  Stable right renal lesion suspicious for metastasis   7.  Decrease in size of mass in the right ischio rectal fossa      I personally discussed impression 1-3 with Fitz Burr at 11/25/24 12:44 AM               Workstation performed: [id number]         CT head without contrast   Final Result by Konstantin Alvarado,  MD (11/24 6426)      Reidentified vasogenic edema at the left parietal and left occipital lobe similar to the prior exam, attributed to the patient's history of brain metastases, poorly visualized on this unenhanced study. There is similar mass effect on the posterior horn    of the left lateral ventricle. No midline shift.      No acute intracranial abnormality.      Mild chronic small vessel ischemic changes.               Workstation performed: [id number]         XR chest 2 views   ED Interpretation by Fitz OLVERA DO (11/25 0211)   RUL infiltrate, as interpreted by me.       Final Result by Triston Chandra MD (11/25 1037)      Right upper lobe pneumonia.      Redemonstration of the right upper lobe mass, better evaluated on the CT from 11/24/2024.            Resident: Sanaz Corona I, the attending radiologist, have reviewed the images and agree with the final report above.      Workstation performed: [id number]         XR chest portable    (Results Pending)     Other Study Results Review: No additional pertinent studies reviewed.    Last 24 Hours Medication List:     Current Facility-Administered Medications:     acetaminophen (TYLENOL) tablet 650 mg, Q6H KRISS    aluminum-magnesium hydroxide-simethicone (MAALOX) oral suspension 30 mL, Q6H PRN    benzonatate (TESSALON PERLES) capsule 200 mg, TID PRN    ceFEPime (MAXIPIME) 2,000 mg in dextrose 5 % 50 mL IVPB, Q12H, Last Rate: 2,000 mg (12/01/24 0535)    cyanocobalamin (VITAMIN B-12) tablet 1,000 mcg, Daily    dexamethasone (DECADRON) tablet 2 mg, Daily    folic acid (FOLVITE) tablet 1 mg, Daily    gabapentin (NEURONTIN) capsule 100 mg, BID before breakfast/lunch    gabapentin (NEURONTIN) capsule 300 mg, HS    ibuprofen (MOTRIN) tablet 400 mg, Q6H PRN    lactated ringers infusion, Continuous, Last Rate: Stopped (12/01/24 0727)    lactobacillus acidophilus-bulgaricus (FLORANEX) packet 1 packet, Daily    levETIRAcetam (KEPPRA) tablet 1,000 mg, Q12H  KRISS    levothyroxine tablet 50 mcg, Early Morning    LORazepam (ATIVAN) injection 1 mg, Q8H PRN    LORazepam (ATIVAN) injection 2 mg, Q8H PRN    ondansetron (ZOFRAN) injection 4 mg, Q6H PRN    pantoprazole (PROTONIX) EC tablet 40 mg, Early Morning    rivaroxaban (XARELTO) tablet 15 mg, BID With Meals    senna (SENOKOT) tablet 8.6 mg, HS PRN    vancomycin (VANCOCIN) 1000 mg in sodium chloride 0.9% 250 mL IVPB, Q24H    Facility-Administered Medications Ordered in Other Encounters:     fentaNYL injection, PRN    midazolam (VERSED) injection, PRN    Administrative Statements   Today, Patient Was Seen By: Marta Malagon MD      **Please Note: This note may have been constructed using a voice recognition system.**

## 2024-12-01 NOTE — ASSESSMENT & PLAN NOTE
Patient requiring oxygen 4 L via nasal cannula currently. Oxygen needs likely due to the setting of pneumonia and pulmonary embolism. Also had recent bronchoscopy which can increase oxygen needs initially afterwards.

## 2024-12-02 ENCOUNTER — TELEPHONE (OUTPATIENT)
Dept: HEMATOLOGY ONCOLOGY | Facility: CLINIC | Age: 74
End: 2024-12-02

## 2024-12-02 LAB
ALBUMIN SERPL BCG-MCNC: 3.3 G/DL (ref 3.5–5)
ALP SERPL-CCNC: 38 U/L (ref 34–104)
ALT SERPL W P-5'-P-CCNC: 10 U/L (ref 7–52)
ANION GAP SERPL CALCULATED.3IONS-SCNC: 6 MMOL/L (ref 4–13)
ANISOCYTOSIS BLD QL SMEAR: PRESENT
AST SERPL W P-5'-P-CCNC: 12 U/L (ref 13–39)
BASOPHILS # BLD MANUAL: 0.03 THOUSAND/UL (ref 0–0.1)
BASOPHILS NFR MAR MANUAL: 1 % (ref 0–1)
BILIRUB SERPL-MCNC: 0.32 MG/DL (ref 0.2–1)
BUN SERPL-MCNC: 13 MG/DL (ref 5–25)
CALCIUM ALBUM COR SERPL-MCNC: 9.9 MG/DL (ref 8.3–10.1)
CALCIUM SERPL-MCNC: 9.3 MG/DL (ref 8.4–10.2)
CHLORIDE SERPL-SCNC: 103 MMOL/L (ref 96–108)
CO2 SERPL-SCNC: 31 MMOL/L (ref 21–32)
CREAT SERPL-MCNC: 0.84 MG/DL (ref 0.6–1.3)
CRP SERPL QL: 175.5 MG/L
DIFFERENTIAL COMMENT: ABNORMAL
EOSINOPHIL # BLD MANUAL: 0 THOUSAND/UL (ref 0–0.4)
EOSINOPHIL NFR BLD MANUAL: 0 % (ref 0–6)
ERYTHROCYTE [DISTWIDTH] IN BLOOD BY AUTOMATED COUNT: 16.1 % (ref 11.6–15.1)
FUNGUS SPEC CULT: NORMAL
GFR SERPL CREATININE-BSD FRML MDRD: 68 ML/MIN/1.73SQ M
GLUCOSE SERPL-MCNC: 80 MG/DL (ref 65–140)
HCT VFR BLD AUTO: 26.6 % (ref 34.8–46.1)
HGB BLD-MCNC: 8.5 G/DL (ref 11.5–15.4)
LYMPHOCYTES # BLD AUTO: 0.17 THOUSAND/UL (ref 0.6–4.47)
LYMPHOCYTES # BLD AUTO: 5 % (ref 14–44)
MACROCYTES BLD QL AUTO: PRESENT
MAGNESIUM SERPL-MCNC: 1.7 MG/DL (ref 1.9–2.7)
MCH RBC QN AUTO: 31 PG (ref 26.8–34.3)
MCHC RBC AUTO-ENTMCNC: 32 G/DL (ref 31.4–37.4)
MCV RBC AUTO: 97 FL (ref 82–98)
METAMYELOCYTE ABSOLUTE CT: 0.07 THOUSAND/UL (ref 0–0.1)
METAMYELOCYTES NFR BLD MANUAL: 2 % (ref 0–1)
MONOCYTES # BLD AUTO: 0.17 THOUSAND/UL (ref 0–1.22)
MONOCYTES NFR BLD: 5 % (ref 4–12)
MYELOCYTE ABSOLUTE CT: 0.17 THOUSAND/UL (ref 0–0.1)
MYELOCYTES NFR BLD MANUAL: 5 % (ref 0–1)
NEUTROPHILS # BLD MANUAL: 2.85 THOUSAND/UL (ref 1.85–7.62)
NEUTS BAND NFR BLD MANUAL: 9 % (ref 0–8)
NEUTS SEG NFR BLD AUTO: 73 % (ref 43–75)
NRBC BLD AUTO-RTO: 1 /100 WBC (ref 0–2)
PATHOLOGY REVIEW: YES
PLATELET # BLD AUTO: 270 THOUSANDS/UL (ref 149–390)
PLATELET BLD QL SMEAR: ADEQUATE
PMV BLD AUTO: 8.7 FL (ref 8.9–12.7)
POLYCHROMASIA BLD QL SMEAR: PRESENT
POTASSIUM SERPL-SCNC: 4 MMOL/L (ref 3.5–5.3)
PROCALCITONIN SERPL-MCNC: 0.25 NG/ML
PROT SERPL-MCNC: 6.5 G/DL (ref 6.4–8.4)
RBC # BLD AUTO: 2.74 MILLION/UL (ref 3.81–5.12)
RBC MORPH BLD: PRESENT
SODIUM SERPL-SCNC: 140 MMOL/L (ref 135–147)
WBC # BLD AUTO: 4 THOUSAND/UL (ref 4.31–10.16)

## 2024-12-02 PROCEDURE — 99232 SBSQ HOSP IP/OBS MODERATE 35: CPT | Performed by: FAMILY MEDICINE

## 2024-12-02 PROCEDURE — 85027 COMPLETE CBC AUTOMATED: CPT | Performed by: FAMILY MEDICINE

## 2024-12-02 PROCEDURE — 80053 COMPREHEN METABOLIC PANEL: CPT | Performed by: FAMILY MEDICINE

## 2024-12-02 PROCEDURE — 83735 ASSAY OF MAGNESIUM: CPT | Performed by: FAMILY MEDICINE

## 2024-12-02 PROCEDURE — 86140 C-REACTIVE PROTEIN: CPT | Performed by: FAMILY MEDICINE

## 2024-12-02 PROCEDURE — 99232 SBSQ HOSP IP/OBS MODERATE 35: CPT | Performed by: INTERNAL MEDICINE

## 2024-12-02 PROCEDURE — 85060 BLOOD SMEAR INTERPRETATION: CPT | Performed by: PATHOLOGY

## 2024-12-02 PROCEDURE — 84145 PROCALCITONIN (PCT): CPT | Performed by: FAMILY MEDICINE

## 2024-12-02 PROCEDURE — 99223 1ST HOSP IP/OBS HIGH 75: CPT | Performed by: INTERNAL MEDICINE

## 2024-12-02 RX ORDER — LANOLIN ALCOHOL/MO/W.PET/CERES
800 CREAM (GRAM) TOPICAL ONCE
Status: COMPLETED | OUTPATIENT
Start: 2024-12-02 | End: 2024-12-02

## 2024-12-02 RX ORDER — MAGNESIUM SULFATE HEPTAHYDRATE 40 MG/ML
2 INJECTION, SOLUTION INTRAVENOUS ONCE
Status: COMPLETED | OUTPATIENT
Start: 2024-12-02 | End: 2024-12-02

## 2024-12-02 RX ORDER — METHYLPREDNISOLONE SODIUM SUCCINATE 40 MG/ML
40 INJECTION, POWDER, LYOPHILIZED, FOR SOLUTION INTRAMUSCULAR; INTRAVENOUS DAILY
Status: DISCONTINUED | OUTPATIENT
Start: 2024-12-03 | End: 2024-12-03

## 2024-12-02 RX ADMIN — LEVOTHYROXINE SODIUM 50 MCG: 0.05 TABLET ORAL at 05:01

## 2024-12-02 RX ADMIN — MAGNESIUM SULFATE HEPTAHYDRATE 2 G: 40 INJECTION, SOLUTION INTRAVENOUS at 06:43

## 2024-12-02 RX ADMIN — Medication 800 MG: at 06:45

## 2024-12-02 RX ADMIN — CYANOCOBALAMIN TAB 500 MCG 1000 MCG: 500 TAB at 08:18

## 2024-12-02 RX ADMIN — LEVETIRACETAM 1000 MG: 250 TABLET, FILM COATED ORAL at 21:56

## 2024-12-02 RX ADMIN — SODIUM CHLORIDE 1000 MG: 0.9 INJECTION, SOLUTION INTRAVENOUS at 08:23

## 2024-12-02 RX ADMIN — ACETAMINOPHEN 650 MG: 325 TABLET, FILM COATED ORAL at 11:58

## 2024-12-02 RX ADMIN — FOLIC ACID 1 MG: 1 TABLET ORAL at 08:18

## 2024-12-02 RX ADMIN — PANTOPRAZOLE SODIUM 40 MG: 40 TABLET, DELAYED RELEASE ORAL at 05:00

## 2024-12-02 RX ADMIN — CEFEPIME 2000 MG: 2 INJECTION, POWDER, FOR SOLUTION INTRAVENOUS at 05:00

## 2024-12-02 RX ADMIN — RIVAROXABAN 15 MG: 15 TABLET, FILM COATED ORAL at 17:05

## 2024-12-02 RX ADMIN — GABAPENTIN 100 MG: 100 CAPSULE ORAL at 06:45

## 2024-12-02 RX ADMIN — RIVAROXABAN 15 MG: 15 TABLET, FILM COATED ORAL at 08:18

## 2024-12-02 RX ADMIN — DEXAMETHASONE 2 MG: 2 TABLET ORAL at 08:18

## 2024-12-02 RX ADMIN — LEVETIRACETAM 1000 MG: 250 TABLET, FILM COATED ORAL at 08:18

## 2024-12-02 RX ADMIN — ACETAMINOPHEN 650 MG: 325 TABLET, FILM COATED ORAL at 17:05

## 2024-12-02 RX ADMIN — ACETAMINOPHEN 650 MG: 325 TABLET, FILM COATED ORAL at 05:00

## 2024-12-02 RX ADMIN — Medication 1 PACKET: at 08:18

## 2024-12-02 RX ADMIN — GABAPENTIN 300 MG: 300 CAPSULE ORAL at 21:56

## 2024-12-02 RX ADMIN — GABAPENTIN 100 MG: 100 CAPSULE ORAL at 11:58

## 2024-12-02 NOTE — TELEPHONE ENCOUNTER
Per Adilene French RN, Dr. Castro's nurse, as the patient is currently hospitalized and has her follow up appointment with Dr. Castro on 12/10, she asked that I reach out to [facility] to have Ayla's 12/5 infusion appointment cancelled. I contacted [facility] and was able to speak with Esha regarding the above. She indicated that the appointment will be cancelled.

## 2024-12-02 NOTE — PROGRESS NOTES
Ayla Nye is a 74 y.o. female who is currently ordered Vancomycin IV with management by the Pharmacy Consult service.  Relevant clinical data and objective / subjective history reviewed.  Vancomycin Assessment:  Indication and Goal AUC/Trough: Pneumonia (goal -600, trough >10)  Clinical Status: stable  Micro:     Renal Function:  SCr: 0.84 mg/dL  CrCl: 46.4 mL/min  Renal replacement: Not on dialysis  Days of Therapy: 4  Current Dose: 1000 mg IV q24h  Vancomycin Plan:  New Dosing: continue current dose  Estimated AUC: 482 mcg*hr/mL  Estimated Trough: 11.8 mcg/mL  Next Level: 12/08 at 0600  Renal Function Monitoring: Daily BMP and UOP  Pharmacy will continue to follow closely for s/sx of nephrotoxicity, infusion reactions and appropriateness of therapy.  BMP and CBC will be ordered per protocol. We will continue to follow the patient’s culture results and clinical progress daily.    Staci Medrano, Pharmacist

## 2024-12-02 NOTE — PROGRESS NOTES
Vancomycin IV Pharmacy-to-Dose Consultation    Ayla Nye is a 74 y.o. female who is currently receiving Vancomycin IV with management by the Pharmacy Consult service.    Relevant clinical data and objective / subjective history reviewed.  Vancomycin Assessment:  Indication and Goal AUC/Trough: Pneumonia (goal -600, trough >10)    Micro:       Renal Function:  SCr:  0.84 mg/dL  CrCl:  46 mL/min  Days of Therapy:  5  Current Dose:  1000mg IV every 24 hours   Vancomycin Plan:  New Dosing: continue current dosing  Estimated AUC:  484 mcg*hr/mL  Estimated Trough:  11.9 mcg/mL  Next Level: 0600 on 12/8  Renal Function Monitoring: Daily BMP and UOP  Pharmacy will continue to follow closely for s/sx of nephrotoxicity, infusion reactions and appropriateness of therapy.  BMP and CBC will be ordered per protocol. We will continue to follow the patient’s culture results and clinical progress daily.    Jessica Ambriz, Pharmacist

## 2024-12-02 NOTE — PROGRESS NOTES
Progress Note - Pulmonology   Name: Ayla Nye 74 y.o. female I MRN: [id number]  Unit/Bed#: S -01 I Date of Admission: 11/24/2024   Date of Service: 12/2/2024 I Hospital Day: 7     Assessment & Plan  Acute hypoxic respiratory failure (HCC)  Likely setting to worsening right upper lobe infiltrates, less likely progression of PE  CXR 11/29/2024 with increased haziness of right upper lung compared to admit CXR, CT chest on admit showed right upper lobe consolidations that appear to be consistent with pneumonia  Persistent fevers on Rocephin, s/p bronchoscopy 11/29 with lymphocyte predominant BAL and cultures with no growth to date, pneumocystis PCR and Aspergillus pending  Wean oxygen for goal sats >90%, follow remainder of bronchoscopy labs, continue ABX for pneumonia  Pneumonia  Noted initially on admit CXR and CT chest, MRSA swab negative  Has been on Rocephin with continued fevers and CXR on 11/29 with worsening RUL infiltrates  Bronchoscopy 11/29 with cultures showing no growth  CRP 22.7 on 11/11, CRP trend since 11/29 172--146--170--175  Fevers resolved after escalating to Vanco/cefepime but BAL showed lymphocyte predominant fluid and no organisms identified on Gram stain.  Still suspect that this is infectious in origin and will continue ABX however, cannot rule out either lymphangitic spread of known RUL mass versus chemotherapy pneumonitis versus   Will start Solumedrol 40 mg daily for now to assess response  Acute pulmonary embolism (HCC)  Likely provoked in setting of known adeno-NSCLC with brain metastases  Continue Xarelto, will likely need lifelong given metastatic cancer  Malignant neoplasm of upper lobe, right bronchus or lung (HCC)  Follows with Dr. Penn, currently on cycle 2 of pemetrexed/carboplatin/pembrolizumab with last infusion 11/14/2024  Neutropenia (HCC)  Insetting of chemotherapy, may be masking typical immune response explaining negative procalcitonin and low WBC    24  Hour Events : No acute events overnight  Subjective : She says that she is not feeling much better but her fevers have resolved.  She still gets some chills at night.  She feels like her breathing is not much improved and she is still quite fatigued and does not have much energy.  She has less of an appetite compared to before she came to the hospital but is still trying to eat most of her meals.  She denies any new cough but it has been persistent although she is still not bringing up any mucus.  She denies any hemoptysis.  Discussed available results of bronchoscopy and explained that while infection is still the most likely etiology and we will continue her antibiotics given the resolution of her fevers there is a possibility that this represents a noninfectious cause.    Of note during the last hospitalization in October she was on high doses of Decadron at about 60 mg but has been weaned down over the past month and is currently on 2 mg of Decadron daily.    Objective :  Temp:  [97.4 °F (36.3 °C)-98.8 °F (37.1 °C)] 98.2 °F (36.8 °C)  HR:  [82-96] 96  BP: (117-155)/(68-88) 129/68  Resp:  [18-19] 18  SpO2:  [95 %-96 %] 95 %  O2 Device: Nasal cannula  Nasal Cannula O2 Flow Rate (L/min):  [2 L/min] 2 L/min    Physical Exam  Vitals reviewed.   Constitutional:       General: She is not in acute distress.     Appearance: She is not toxic-appearing.   Cardiovascular:      Rate and Rhythm: Normal rate and regular rhythm.      Heart sounds: No murmur heard.  Pulmonary:      Effort: Pulmonary effort is normal. No respiratory distress.      Breath sounds: No wheezing, rhonchi or rales.   Neurological:      General: No focal deficit present.      Mental Status: She is alert and oriented to person, place, and time.         Lab Results: I have reviewed the following results:   .     12/02/24  0329   WBC 4.00*   HGB 8.5*   HCT 26.6*      SODIUM 140   K 4.0      CO2 31   BUN 13   CREATININE 0.84   GLUC 80   MG 1.7*    AST 12*   ALT 10   ALB 3.3*   TBILI 0.32   ALKPHOS 38     ABG: No new results in last 24 hours.    Imaging Results Review: I personally reviewed the following image studies in PACS and associated radiology reports: chest xray. My interpretation of the radiology images/reports is: CXR from 12/1 shows persistent right upper lobe infiltrate without significant change.  Other Study Results Review: Other studies reviewed include: Bronchoscopy BAL with differential showing leukocyte predominance, cultures no growth to date    Eliseo Small M.D.  Pulmonary & Critical Care Fellow, PGY-IV

## 2024-12-02 NOTE — ASSESSMENT & PLAN NOTE
Likely setting to worsening right upper lobe infiltrates, less likely progression of PE  CXR 11/29/2024 with increased haziness of right upper lung compared to admit CXR, CT chest on admit showed right upper lobe consolidations that appear to be consistent with pneumonia  Persistent fevers on Rocephin, s/p bronchoscopy 11/29 with lymphocyte predominant BAL and cultures with no growth to date, pneumocystis PCR and Aspergillus pending  Wean oxygen for goal sats >90%, follow remainder of bronchoscopy labs, continue ABX for pneumonia

## 2024-12-02 NOTE — ASSESSMENT & PLAN NOTE
Component      Latest Ref Rng 2/16/2024 3/12/2024 4/5/2024 4/30/2024 5/20/2024   Absolute Neutrophils      1.85 - 7.62 Thousand/uL 6.06  5.47  4.95  5.71  5.87      Component      Latest Ref Rng 5/28/2024 6/3/2024 6/10/2024 6/18/2024 6/22/2024   Absolute Neutrophils      1.85 - 7.62 Thousand/uL 5.89  6.31  5.21  3.42  3.63      Component      Latest Ref Rng 6/23/2024 6/24/2024 7/1/2024 7/2/2024 7/3/2024 7/15/2024   Absolute Neutrophils      1.85 - 7.62 Thousand/uL 2.38  1.58 (L)  1.81 (L)  2.08  1.83 (L)  2.08      Component      Latest Ref Rng 7/29/2024 8/21/2024 8/22/2024 8/23/2024 8/24/2024   Absolute Neutrophils      1.85 - 7.62 Thousand/uL 2.95  6.57  5.54  6.22  4.46      Component      Latest Ref Rng 9/9/2024 9/10/2024 9/11/2024 9/12/2024 9/13/2024   Absolute Neutrophils      1.85 - 7.62 Thousand/uL 5.00  3.22  5.80  4.26  7.84 (H)      Component      Latest Ref Rng 10/3/2024 10/16/2024 10/21/2024 10/22/2024 10/27/2024   Absolute Neutrophils      1.85 - 7.62 Thousand/uL 7.17  1.58 (L)  2.79  4.39  9.78 (H)      Component      Latest Ref Rng 11/11/2024 11/24/2024   Absolute Neutrophils      1.85 - 7.62 Thousand/uL 9.40 (H)  1.54 (L)       Recent Labs     12/02/24  0329   AST 12*   ALT 10   ALKPHOS 38   TBILI 0.32         Fever at home 101.3  Urine culture 11/24: < 10,000 cfu/mL mixed contaminants x2  PLAN:  Antibiotics cefepime and Vanc switched on 11/29 from ceftriaxone due to minimal improvement. Watch for reaction as patient has had rashes with keflex in her hx.   Oncology consulted  - Pulm consulted  -ID consulted  Neutropenic precautions

## 2024-12-02 NOTE — PROGRESS NOTES
Vancomycin IV Pharmacy-to-Dose Consultation     Vancomycin has been discontinued.  Pharmacy will sign off.  Please contact or re-consult with questions.    Staci Medrano, Pharmacist

## 2024-12-02 NOTE — CONSULTS
Consultation - Infectious Disease   Ayla Nye 74 y.o. female MRN: [id number]  Unit/Bed#: S -01 Encounter: [id number]      IMPRESSION & RECOMMENDATIONS:   Right upper lobe community-acquired pneumonia: Immunosuppressed patient with lung cancer on chemotherapy who presented with nonproductive cough, night sweats fevers and chills.  Found to have right upper lobe CAP.  Urine culture negative.  Flu COVID negative.  MRSA culture negative.  Initially on ceftriaxone and vancomycin, broadened to cefepime and vancomycin due to continued fevers and a chest x-ray on 11/29 showing worsening right upper lobe infiltrate.  Pulmonology was consulted and a bronchoscopy was done on 11/29 to evaluate for MDR.  Bronc cultures so far have been negative.  BAL differential showing lymphocytic predominant fluid.  Blood cultures no growth to date.  Patient has been afebrile since 11/30 after escalation to cefepime and vancomycin. Differentials include CAP 2/2 immunosuppression vs chemo induced pneumonitis vs .  Continue cefepime for now  Consider discontinuing vancomycin  Follow-up bronchoscopy studies  Follow-up 12/1 blood cultures  Bilateral pulmonary embolism: CTA chest on 11/24 showing bilateral pulmonary embolism with RV to LV ratio 1.4.  Patient transitioned to Xarelto  Lung adenocarcinoma with brain metastasis on chemotherapy: Last received chemotherapy with carboplatin and pembrolizumab October 7.  Follows with Dr. Castro at [facility] oncology.  Has received radiation for brain mets August 2024.    Leukopenia/anemia: likely in the setting of chemotherapy      I have discussed the above management plan in detail with the primary service    I have performed an extensive review of the medical records in Epic including review of the notes, radiographs, and laboratory results     HISTORY OF PRESENT ILLNESS:  Reason for Consult: Pneumonia  HPI: Ayla Nye is a 74 y.o. year old female with past medical history of  lung adenocarcinoma with brain mets on chemotherapy who presented on 11/24 initially with night sweats, nonproductive cough, fever and chills.  Patient was found to have bilateral pulmonary embolism and a right upper lobe consolidation suspicious for pneumonia.  She was started on ceftriaxone and vancomycin.  Vancomycin was discontinued after negative MRSA swab.  Patient had a new oxygen requirement of 4 L O2.  Patient kept having recurrent fevers throughout hospitalization for which her antibiotics were broadened to cefepime and vancomycin.  Pulmonology was consulted and a bronchoscopy was done on 11/29 due to the recurrent fevers as well as a chest x-ray showing no improvement in the right upper lobe consolidation.  Bronchoscopy studies are still pending.  Patient states that overall she feels very little improvement in her symptoms.  She is still having shortness of breath as well as a nonproductive cough.  She denies any chest pain, back pain, abdominal pain, UTI symptoms.  She has not had a fever since 11/30 and she denies chills.    REVIEW OF SYSTEMS:  A complete review of systems is negative other than that noted in the HPI.    PAST MEDICAL HISTORY:  Past Medical History:   Diagnosis Date    Allergic 2022    Allergic to neomiacin and cephalexin    Cancer (HCC)     lung cancer    Cancer (HCC)     mets to brain    Cancer (HCC)     perianal mass    Cataract Nov. 2023    Due to have durgery June 2024    Essential thrombocytosis (HCC)     controlled with medication    Hyperlipidemia     Hypertension     Mass in chest     Nodular goiter     Osteoporosis     Pneumonia Oct 16, 2023    Also  Feb 2024    Psoriasis     SIRS (systemic inflammatory response syndrome) (HCC) 06/22/2024     Past Surgical History:   Procedure Laterality Date    APPENDECTOMY      BREAST CYST EXCISION Right 2009    COLONOSCOPY  10/31/2018    DXA PROCEDURE (HISTORICAL)  04/21/2017    IR BIOPSY OTHER  9/12/2024    IR LUMBAR PUNCTURE  9/12/2024     MAMMO (HISTORICAL)      18    MAMMO NEEDLE LOCALIZATION RIGHT (ALL INC) Right 2009    SKIN LESION EXCISION      bridge of nose       FAMILY HISTORY:  Non-contributory    SOCIAL HISTORY:  Social History   Social History     Substance and Sexual Activity   Alcohol Use Yes    Alcohol/week: 5.0 standard drinks of alcohol    Types: 5 Glasses of wine per week     Social History     Substance and Sexual Activity   Drug Use Never     Social History     Tobacco Use   Smoking Status Former    Current packs/day: 1.00    Average packs/day: 1 pack/day for 58.9 years (58.9 ttl pk-yrs)    Types: Cigarettes    Start date:     Passive exposure: Past   Smokeless Tobacco Never   Tobacco Comments    Quit        ALLERGIES:  Allergies   Allergen Reactions    Doxycycline Headache    Keflex [Cephalexin] Rash    Neomycin-Polymyxin-Dexameth Eye Swelling       MEDICATIONS:  All current active medications have been reviewed.      PHYSICAL EXAM:  Temp:  [97.4 °F (36.3 °C)-98.4 °F (36.9 °C)] 98.2 °F (36.8 °C)  HR:  [82-96] 96  Resp:  [18-19] 18  BP: (117-155)/(68-88) 129/68  SpO2:  [95 %-96 %] 95 %  Temp (24hrs), Av °F (36.7 °C), Min:97.4 °F (36.3 °C), Max:98.4 °F (36.9 °C)  Current: Temperature: 98.2 °F (36.8 °C)    Intake/Output Summary (Last 24 hours) at 2024 1151  Last data filed at 2024 0900  Gross per 24 hour   Intake 460 ml   Output --   Net 460 ml       General Appearance:  Appearing well, nontoxic, and in no distress   Head:  Normocephalic, without obvious abnormality, atraumatic   Eyes:  Conjunctiva pink and sclera anicteric, both eyes   Nose: Nares normal, mucosa normal, no drainage   Throat: Oropharynx moist without lesions   Neck: Supple, symmetrical, no adenopathy, no tenderness/mass/nodules   Back:   Symmetric, no curvature, ROM normal, no CVA tenderness   Lungs:   Diminished breath sounds in upper lobes bilaterally, respirations unlabored   Chest Wall:  No tenderness or deformity   Heart:  RRR; no  murmur, rub or gallop   Abdomen:   Soft, non-tender, non-distended, positive bowel sounds    Extremities: No cyanosis, clubbing or edema   Skin: No rashes or lesions. No draining wounds noted.   Lymph nodes: Cervical, supraclavicular nodes normal   Neurologic: Alert and oriented times 3, extremity strength 5/5 and symmetric       LABS, IMAGING, & OTHER STUDIES:  Lab Results:  I have personally reviewed pertinent labs.  Results from last 7 days   Lab Units 12/02/24 0329 12/01/24 0357 11/30/24  0619   WBC Thousand/uL 4.00* 3.48* 3.54*   HEMOGLOBIN g/dL 8.5* 9.3* 8.6*   PLATELETS Thousands/uL 270 328 266     Results from last 7 days   Lab Units 12/02/24 0329 12/01/24 0357 11/30/24  0619   SODIUM mmol/L 140 140 135   POTASSIUM mmol/L 4.0 3.7 4.3   CHLORIDE mmol/L 103 102 104   CO2 mmol/L 31 27 20*   BUN mg/dL 13 13 16   CREATININE mg/dL 0.84 0.84 0.83   EGFR ml/min/1.73sq m 68 68 69   CALCIUM mg/dL 9.3 9.6 8.7   AST U/L 12*  --   --    ALT U/L 10  --   --    ALK PHOS U/L 38  --   --      Results from last 7 days   Lab Units 12/01/24  1657 11/29/24  1612 11/25/24  1203   BLOOD CULTURE  Received in Microbiology Lab. Culture in Progress.  Received in Microbiology Lab. Culture in Progress.  --   --    GRAM STAIN RESULT   --  1+ Polys  No bacteria seen  --    MRSA CULTURE ONLY   --   --  No Methicillin Resistant Staphlyococcus aureus (MRSA) isolated     Results from last 7 days   Lab Units 12/02/24 0329 12/01/24 0357 11/29/24  0436 11/26/24  0515   PROCALCITONIN ng/ml 0.25 0.18 0.21 0.15     Results from last 7 days   Lab Units 12/02/24 0329 12/01/24 0357 11/30/24  0619 11/29/24  0446   CRP mg/L 175.5* 170.2* 146.1* 172.7*               Imaging Studies:   I have personally reviewed pertinent imaging study reports and images in PACS.      Other Studies:   I have personally reviewed pertinent reports.      Mynor Ceja MD  Internal Medicine

## 2024-12-02 NOTE — ASSESSMENT & PLAN NOTE
Stage III NSCLC, new onset brain mets 7/30/24  Pt is on immunotherapy ketruda and carboplatin last dose 14NOV24   Oncologist Dr. Castro with [facility].    Radiologist Dr. Vuong with [facility]    OP Pembrolizumab + PEMEtrexed + CARBOplatin Q 21 Days Day 1 Cycle 3 due 05DEC24  Last dose 14NOV24    PLAN:  Onc consulted and following  - attempted to reach out to Dr. Castro today to coordinate care. Will wait to hear back  - attempted to reach out to Dr. Gamboa today to coordinate care, he is out of the office until 12/9

## 2024-12-02 NOTE — ASSESSMENT & PLAN NOTE
"  Recent Labs     11/30/24  0619 12/01/24  0357 12/02/24  0329   WBC 3.54* 3.48* 4.00*     Recent Labs     11/30/24  0619 12/01/24  0357 12/02/24  0329   CREATININE 0.83 0.84 0.84   EGFR 69 68 68     Estimated Creatinine Clearance: 46.5 mL/min (by C-G formula based on SCr of 0.84 mg/dL).  No results for input(s): \"LACTICACID\" in the last 72 hours.    Recent Labs     12/01/24  0357 12/02/24  0329   PROCALCITONI 0.18 0.25       Vitals:    12/02/24 0459   BP:    Pulse:    Resp:    Temp: 98.4 °F (36.9 °C)   SpO2:      Temperature: 98.4 °F (36.9 °C)  Temp Source: Oral        Recent Labs     11/29/24  1612 12/01/24  1657   BLOODCX  --  Received in Microbiology Lab. Culture in Progress.  Received in Microbiology Lab. Culture in Progress.   GRAMSTAIN 1+ Polys  No bacteria seen  --        CT pe study w abdomen pelvis w contrast  Result Date: 11/25/2024  Right upper lobe opacities consistent with pneumonia 4.  Mild pulmonary edema       DEFINITIONS   SIRS: two of the following that cannot be better explained by another cause  HR:>90/min  and WBC: <4x109/L (<4000/mm3), >12x109/L (>12,000/mm3), or =10% bands  SEPSIS  SIRS AND  Confirmed OR Suspected infection Lungs  qSOFA=0  Blood cultures negative at 72 hours  Procal continues to be negative  MRSA swab negative, vanc stopped 11/27    PLAN:  LABS:   Trend CBC and fever as markers of ongoing infection  Pneumococcal Antibody 23 ordered, to be followed up outpatient. Ordered prevnar 20 vaccine her for which was administered on 11/27.  Bronchoscopy results so far: Bronchoalveolar lavage: 2% neutrophils. 58% lymphs, 40% macrophage, total count 100, bronchial gram stain 1+ polys, no bacteria seen. AFB culture with stain negative for acid fast bacilli    REASSESS DAILY: Reassess pt response to txt daily. Document improvement or worsening status. Patient had oxygen saturation as low as 86% on room air while awake on 11/28. It went as high as 91% but did not maintain. Ordered her " nasal cannula oxygen to improve this to >90%, but it was never given. 11/29 patient saturating to low 90s on room air. Oxygen added 11/29. Currently on 4 L nasal cannula.   Given tessalon perles PRN for cough.   Repeated chest x-ray 11/29 which showed worsening right upper lobe pneumonia. Consulted pulmonology and had bronchoscopy 11/29. Pending tests: fungal culture, virus culture, legionella culture, aspergillus antigen, pneumocystis PCR    ABX  Levoquin 750mg IV q48hrs (renal dose) Given one dose 24NOV24, stopped and switched to ceftriaxone 1,000 mg Q24Hr on 11/25. Stopped 11/29.  Vanc 750mg IV q12hrs Started 23:00 24NOV24. Stopped on 11/27 due to negative MRSA cx  Duration 7-10 days (11/30 is day 7)  Current regimen per pulm recs: cefepime and vanc started 11/29  ID consulted 12/1 due to lack of improvement    NUTRITION:   Good nutrition and tight glucose management   No current benefit of IV Vit C

## 2024-12-02 NOTE — ASSESSMENT & PLAN NOTE
Blood Pressure: 155/88  Recent Labs     11/30/24  0619 12/01/24  0357 12/02/24  0329   HGB 8.6* 9.3* 8.5*   BL 8-11    Plan:  -monitor, on Xarelto.   - on AM of 11/29, patient had hemoglobin of 6.6. no coughing up blood, or blood in stool or urine. Only slight bleeding with blowing her nose that she notes. Prepared and gave 1 unit of leukoreduced PRBCs. Repeat H&H afterwards was 9.2.  Transfuse if Hb < 7

## 2024-12-02 NOTE — PROGRESS NOTES
Progress Note - Hospitalist   Name: Ayla Nye 74 y.o. female I MRN: [id number]  Unit/Bed#: S -01 I Date of Admission: 2024   Date of Service: 2024 I Hospital Day: 7    Assessment & Plan  Malignant neoplasm of upper lobe, right bronchus or lung (HCC)  Stage III NSCLC, new onset brain mets 24  Pt is on immunotherapy ketruda and carboplatin last dose    Oncologist Dr. Castro with [facility].    Radiologist Dr. Vuong with [facility]    OP Pembrolizumab + PEMEtrexed + CARBOplatin Q 21 Days Day 1 Cycle 3 due [id number]  Last dose     PLAN:  Onc consulted and following  - attempted to reach out to Dr. Castro today to coordinate care. Will wait to hear back  - attempted to reach out to Dr. 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   - on AM of , patient had hemoglobin of 6.6. no coughing up blood, or blood in stool or urine. Only slight bleeding with blowing her nose that she notes. Prepared and gave 1 unit of leukoreduced PRBCs. Repeat H&H afterwards was 9.2.  Transfuse if Hb < 7      Acute pulmonary embolism (HCC)  PERC 2  WELLS 2.5  PESI 104 Class III Intermediate risk   Imagin: CT pe study w abdomen pelvis w contrast  PULMONARY ARTERIAL TREE: Embolism straddles between the middle lobe and right lower lobe basal segment arteries, as seen on series 301112, this is new from the most recent prior.   Additional embolism noted within the left lower lobe basal arteries straddling into the lower lingular segment, as noted on series 301/, also new from prior.  RV diameter at the  "level of the AV valve = 3. 9 cm  LV diameter at the level of the AV valve = 2.7 cm  Ratio equals 1.4  CLASSIFICATION Acute hemodynamically stable bilateral right sided segmental left basilar associated with SOB most likely provoked from cancer hx while on immunotherapy   ECHO 11/25: EF 65%, diastolic dysfunction grade I. Mitral valve has mild annular calcification and tricuspid valve has mild regurgitation.     PLAN  Xarelto started 11/28, heparin drip stopped. Price check performed and acceptable for patient. Plan will be this dose for 21 days, then 20 mg daily for 6 months, then 10 mg indefinitely.     Pneumonia    Recent Labs     11/30/24  0619 12/01/24  0357 12/02/24  0329   WBC 3.54* 3.48* 4.00*     Recent Labs     11/30/24  0619 12/01/24  0357 12/02/24  0329   CREATININE 0.83 0.84 0.84   EGFR 69 68 68     Estimated Creatinine Clearance: 46.5 mL/min (by C-G formula based on SCr of 0.84 mg/dL).  No results for input(s): \"LACTICACID\" in the last 72 hours.    Recent Labs     12/01/24  0357 12/02/24  0329   PROCALCITONI 0.18 0.25       Vitals:    12/02/24 0459   BP:    Pulse:    Resp:    Temp: 98.4 °F (36.9 °C)   SpO2:      Temperature: 98.4 °F (36.9 °C)  Temp Source: Oral        Recent Labs     11/29/24  1612 12/01/24  1657   BLOODCX  --  Received in Microbiology Lab. Culture in Progress.  Received in Microbiology Lab. Culture in Progress.   GRAMSTAIN 1+ Polys  No bacteria seen  --        CT pe study w abdomen pelvis w contrast  Result Date: 11/25/2024  Right upper lobe opacities consistent with pneumonia 4.  Mild pulmonary edema       DEFINITIONS   SIRS: two of the following that cannot be better explained by another cause  HR:>90/min  and WBC: <4x109/L (<4000/mm3), >12x109/L (>12,000/mm3), or =10% bands  SEPSIS  SIRS AND  Confirmed OR Suspected infection Lungs  qSOFA=0  Blood cultures negative at 72 hours  Procal continues to be negative  MRSA swab negative, vanc stopped 11/27    PLAN:  LABS:   Trend CBC and " fever as markers of ongoing infection  Pneumococcal Antibody 23 ordered, to be followed up outpatient. Ordered prevnar 20 vaccine her for which was administered on 11/27.  Bronchoscopy results so far: Bronchoalveolar lavage: 2% neutrophils. 58% lymphs, 40% macrophage, total count 100, bronchial gram stain 1+ polys, no bacteria seen. AFB culture with stain negative for acid fast bacilli    REASSESS DAILY: Reassess pt response to txt daily. Document improvement or worsening status. Patient had oxygen saturation as low as 86% on room air while awake on 11/28. It went as high as 91% but did not maintain. Ordered her nasal cannula oxygen to improve this to >90%, but it was never given. 11/29 patient saturating to low 90s on room air. Oxygen added 11/29. Currently on 4 L nasal cannula.   Given tessalon perles PRN for cough.   Repeated chest x-ray 11/29 which showed worsening right upper lobe pneumonia. Consulted pulmonology and had bronchoscopy 11/29. Pending tests: fungal culture, virus culture, legionella culture, aspergillus antigen, pneumocystis PCR    ABX  Levoquin 750mg IV q48hrs (renal dose) Given one dose 24NOV24, stopped and switched to ceftriaxone 1,000 mg Q24Hr on 11/25. Stopped 11/29.  Vanc 750mg IV q12hrs Started 23:00 24NOV24. Stopped on 11/27 due to negative MRSA cx  Duration 7-10 days (11/30 is day 7)  Current regimen per pulm recs: cefepime and vanc started 11/29  ID consulted 12/1 due to lack of improvement    NUTRITION:   Good nutrition and tight glucose management   No current benefit of IV Vit C  Neutropenia (HCC)      Component      Latest Ref Rng 2/16/2024 3/12/2024 4/5/2024 4/30/2024 5/20/2024   Absolute Neutrophils      1.85 - 7.62 Thousand/uL 6.06  5.47  4.95  5.71  5.87      Component      Latest Ref Rng 5/28/2024 6/3/2024 6/10/2024 6/18/2024 6/22/2024   Absolute Neutrophils      1.85 - 7.62 Thousand/uL 5.89  6.31  5.21  3.42  3.63      Component      Latest Ref Rng 6/23/2024 6/24/2024 7/1/2024  7/2/2024 7/3/2024 7/15/2024   Absolute Neutrophils      1.85 - 7.62 Thousand/uL 2.38  1.58 (L)  1.81 (L)  2.08  1.83 (L)  2.08      Component      Latest Ref Rng 7/29/2024 8/21/2024 8/22/2024 8/23/2024 8/24/2024   Absolute Neutrophils      1.85 - 7.62 Thousand/uL 2.95  6.57  5.54  6.22  4.46      Component      Latest Ref Rng 9/9/2024 9/10/2024 9/11/2024 9/12/2024 9/13/2024   Absolute Neutrophils      1.85 - 7.62 Thousand/uL 5.00  3.22  5.80  4.26  7.84 (H)      Component      Latest Ref Rng 10/3/2024 10/16/2024 10/21/2024 10/22/2024 10/27/2024   Absolute Neutrophils      1.85 - 7.62 Thousand/uL 7.17  1.58 (L)  2.79  4.39  9.78 (H)      Component      Latest Ref Rng 11/11/2024 11/24/2024   Absolute Neutrophils      1.85 - 7.62 Thousand/uL 9.40 (H)  1.54 (L)       Recent Labs     12/02/24  0329   AST 12*   ALT 10   ALKPHOS 38   TBILI 0.32         Fever at home 101.3  Urine culture 11/24: < 10,000 cfu/mL mixed contaminants x2  PLAN:  Antibiotics cefepime and Vanc switched on 11/29 from ceftriaxone due to minimal improvement. Watch for reaction as patient has had rashes with keflex in her hx.   Oncology consulted  - Pulm consulted  -ID consulted  Neutropenic precautions  Sepsis, unspecified organism (HCC)  Patient reached sepsis criteria AM of 11/26 due to fevers, elevated wbc count, and HR >90. LA ordered and normal. Procal continues to be normal. Will continue to monitor with antibiotics for penumonia, and heparin drip for PE. Will continue to get daily labs.   AM of 11/29: patient no longer septic due to no fevers in the past 24 hours, but still having mild temperature elevations and Hrs in the 90s. Will monitor.   11/30-12/1: Max temp 100.2, tachycardic to 103, increasing O2 requirement to 4L. Continue to monitor.   Acute hypoxic respiratory failure (HCC)  Patient requiring oxygen 4 L via nasal cannula currently. Oxygen needs likely due to the setting of pneumonia and pulmonary embolism. Also had recent  bronchoscopy which can increase oxygen needs initially afterwards.     VTE Pharmacologic Prophylaxis: VTE Score: 9 High Risk (Score >/= 5) - Pharmacological DVT Prophylaxis Ordered: rivaroxaban (Xarelto). Sequential Compression Devices Ordered.    Mobility:   Basic Mobility Inpatient Raw Score: 24  JH-HLM Goal: 8: Walk 250 feet or more  JH-HLM Achieved: 6: Walk 10 steps or more  JH-HLM Goal NOT achieved. Continue with multidisciplinary rounding and encourage appropriate mobility to improve upon JH-HLM goals.    Patient Centered Rounds: I performed bedside rounds with nursing staff today.   Discussions with Specialists or Other Care Team Provider: pulm, ID, case management, nursing, attending    Education and Discussions with Family / Patient: Updated  (daughter) at bedside.    Current Length of Stay: 7 day(s)  Current Patient Status: Inpatient   Certification Statement: The patient will continue to require additional inpatient hospital stay due to pneumonia with sepsis  Discharge Plan: Anticipate discharge in 48-72 hrs to home.    Code Status: Level 1 - Full Code    Subjective   Patient seen at bedside this morning with daughter maribel. Both are concerned that patient is feeling worse. Patient states she feels more forgetful today and daughter is concerned that maybe her decadron needs adjusted. She would like coordination of care between the primary team, Dr. Castro, and Dr. Gamboa. I told her I will attempt to reach out to them. They are aware that next steps are consulting ID. Pulm still on board.     Objective :  Temp:  [97.4 °F (36.3 °C)-99.7 °F (37.6 °C)] 98.4 °F (36.9 °C)  HR:  [82-94] 82  BP: (117-155)/(75-88) 155/88  Resp:  [18-19] 19  SpO2:  [93 %-96 %] 96 %  O2 Device: Nasal cannula  Nasal Cannula O2 Flow Rate (L/min):  [4 L/min] 4 L/min    Body mass index is 20.85 kg/m².     Input and Output Summary (last 24 hours):     Intake/Output Summary (Last 24 hours) at 12/2/2024 0607  Last data filed  at 12/1/2024 1322  Gross per 24 hour   Intake 480 ml   Output --   Net 480 ml       Physical Exam  Vitals reviewed.   Constitutional:       General: She is not in acute distress.     Appearance: Normal appearance. She is not ill-appearing.   HENT:      Head: Normocephalic and atraumatic.      Right Ear: External ear normal.      Left Ear: External ear normal.      Nose: Nose normal.      Mouth/Throat:      Mouth: Mucous membranes are moist.      Pharynx: Oropharynx is clear.   Eyes:      Conjunctiva/sclera: Conjunctivae normal.   Cardiovascular:      Rate and Rhythm: Normal rate and regular rhythm.      Pulses: Normal pulses.      Heart sounds: Normal heart sounds.      Comments: HR 90  Pulmonary:      Effort: No respiratory distress.      Breath sounds: No stridor. No wheezing, rhonchi or rales.      Comments: Nasal cannula in place, oxygen set to 4 L, oxygen saturating to 95%.   Chest:      Chest wall: No tenderness.   Abdominal:      General: Abdomen is flat. There is no distension.      Palpations: Abdomen is soft.      Tenderness: There is no abdominal tenderness.   Musculoskeletal:         General: No swelling.   Skin:     General: Skin is warm and dry.      Capillary Refill: Capillary refill takes less than 2 seconds.   Neurological:      General: No focal deficit present.      Mental Status: She is alert and oriented to person, place, and time.   Psychiatric:         Mood and Affect: Mood normal.         Behavior: Behavior normal.         Lab Results: I have reviewed the following results:   Results from last 7 days   Lab Units 12/02/24  0329 12/01/24  0357 11/30/24  0619 11/29/24  0446 11/28/24  0501   WBC Thousand/uL 4.00*   < > 3.54*   < > 3.20*   HEMOGLOBIN g/dL 8.5*   < > 8.6*   < > 7.3*   HEMATOCRIT % 26.6*   < > 27.3*   < > 22.6*   PLATELETS Thousands/uL 270   < > 266   < > 190   BANDS PCT %  --   --  3   < >  --    SEGS PCT %  --   --   --   --  76*   LYMPHO PCT %  --   --  5*   < > 5*   MONO PCT %   --   --  3*   < > 8   EOS PCT %  --   --  2   < > 2    < > = values in this interval not displayed.     Results from last 7 days   Lab Units 12/02/24  0329   SODIUM mmol/L 140   POTASSIUM mmol/L 4.0   CHLORIDE mmol/L 103   CO2 mmol/L 31   BUN mg/dL 13   CREATININE mg/dL 0.84   ANION GAP mmol/L 6   CALCIUM mg/dL 9.3   ALBUMIN g/dL 3.3*   TOTAL BILIRUBIN mg/dL 0.32   ALK PHOS U/L 38   ALT U/L 10   AST U/L 12*   GLUCOSE RANDOM mg/dL 80                 Results from last 7 days   Lab Units 12/02/24  0329 12/01/24  0357 11/29/24  0436 11/26/24  0928 11/26/24  0515 11/25/24  0732   LACTIC ACID mmol/L  --   --   --  1.2  --   --    PROCALCITONIN ng/ml 0.25 0.18 0.21  --  0.15 0.10       Recent Cultures (last 7 days):   Results from last 7 days   Lab Units 12/01/24  1657 11/29/24  1612   BLOOD CULTURE  Received in Microbiology Lab. Culture in Progress.  Received in Microbiology Lab. Culture in Progress.  --    GRAM STAIN RESULT   --  1+ Polys  No bacteria seen       Imaging Results Review: I personally reviewed the following image studies in PACS and associated radiology reports: chest xray. My interpretation of the radiology images/reports is: see reports.  Other Study Results Review: No additional pertinent studies reviewed.    Last 24 Hours Medication List:     Current Facility-Administered Medications:     acetaminophen (TYLENOL) tablet 650 mg, Q6H [facility]    aluminum-magnesium hydroxide-simethicone (MAALOX) oral suspension 30 mL, Q6H PRN    benzonatate (TESSALON PERLES) capsule 200 mg, TID PRN    ceFEPime (MAXIPIME) 2,000 mg in dextrose 5 % 50 mL IVPB, Q12H, Last Rate: 2,000 mg (12/02/24 0500)    cyanocobalamin (VITAMIN B-12) tablet 1,000 mcg, Daily    dexamethasone (DECADRON) tablet 2 mg, Daily    folic acid (FOLVITE) tablet 1 mg, Daily    gabapentin (NEURONTIN) capsule 100 mg, BID before breakfast/lunch    gabapentin (NEURONTIN) capsule 300 mg, HS    ibuprofen (MOTRIN) tablet 400 mg, Q6H PRN    lactated ringers  infusion, Continuous, Last Rate: 75 mL/hr (12/01/24 1834)    lactobacillus acidophilus-bulgaricus (FLORANEX) packet 1 packet, Daily    levETIRAcetam (KEPPRA) tablet 1,000 mg, Q12H [facility]    levothyroxine tablet 50 mcg, Early Morning    LORazepam (ATIVAN) injection 1 mg, Q8H PRN    LORazepam (ATIVAN) injection 2 mg, Q8H PRN    magnesium Oxide (MAG-OX) tablet 800 mg, Once    magnesium sulfate 2 g/50 mL IVPB (premix) 2 g, Once    ondansetron (ZOFRAN) injection 4 mg, Q6H PRN    pantoprazole (PROTONIX) EC tablet 40 mg, Early Morning    rivaroxaban (XARELTO) tablet 15 mg, BID With Meals    senna (SENOKOT) tablet 8.6 mg, HS PRN    vancomycin (VANCOCIN) 1000 mg in sodium chloride 0.9% 250 mL IVPB, Q24H    Facility-Administered Medications Ordered in Other Encounters:     fentaNYL injection, PRN    midazolam (VERSED) injection, PRN    Administrative Statements   Today, Patient Was Seen By: Fabby Monzon DO  I have spent a total time of 60 minutes in caring for this patient on the day of the visit/encounter including Diagnostic results, Prognosis, Risks and benefits of tx options, Instructions for management, Patient and family education, Importance of tx compliance, Risk factor reductions, Impressions, Counseling / Coordination of care, Documenting in the medical record, Reviewing / ordering tests, medicine, procedures  , Obtaining or reviewing history  , and Communicating with other healthcare professionals .    **Please Note: This note may have been constructed using a voice recognition system.**

## 2024-12-02 NOTE — ASSESSMENT & PLAN NOTE
Noted initially on admit CXR and CT chest, MRSA swab negative  Has been on Rocephin with continued fevers and CXR on 11/29 with worsening RUL infiltrates  Bronchoscopy 11/29 with cultures showing no growth  CRP 22.7 on 11/11, CRP trend since 11/29 172--146--170--175  Fevers resolved after escalating to Vanco/cefepime but BAL showed lymphocyte predominant fluid and no organisms identified on Gram stain.  Still suspect that this is infectious in origin and will continue ABX however, cannot rule out either lymphangitic spread of known RUL mass versus chemotherapy pneumonitis versus   Will start Solumedrol 40 mg daily for now to assess response

## 2024-12-02 NOTE — ASSESSMENT & PLAN NOTE
PERC 2  WELLS 2.5  PESI 104 Class III Intermediate risk   [id number]: CT pe study w abdomen pelvis w contrast  PULMONARY ARTERIAL TREE: Embolism straddles between the middle lobe and right lower lobe basal segment arteries, as seen on series 301/112, this is new from the most recent prior.   Additional embolism noted within the left lower lobe basal arteries straddling into the lower lingular segment, as noted on series 301/, also new from prior.  RV diameter at the level of the AV valve = 3. 9 cm  LV diameter at the level of the AV valve = 2.7 cm  Ratio equals 1.4  CLASSIFICATION Acute hemodynamically stable bilateral right sided segmental left basilar associated with SOB most likely provoked from cancer hx while on immunotherapy   ECHO : EF 65%, diastolic dysfunction grade I. Mitral valve has mild annular calcification and tricuspid valve has mild regurgitation.     PLAN  Xarelto started , heparin drip stopped. Price check performed and acceptable for patient. Plan will be this dose for 21 days, then 20 mg daily for 6 months, then 10 mg indefinitely.

## 2024-12-02 NOTE — PROGRESS NOTES
"Oncology Progress Note  Ayla Nye 74 y.o. female MRN: [id number]  Unit/Bed#: S -01 Encounter: [id number]    /83   Pulse 82   Temp 97.7 °F (36.5 °C) (Oral)   Resp 16   Ht 5' 2\" (1.575 m)   Wt 51.7 kg (114 lb)   SpO2 97%   BMI 20.85 kg/m²     Subjective:  NAEON, no new symptoms, I reviewed the patient's vitals which are roughly WNL, I reviewed the patient's labs which show an increase in WBC from 3.5-4.0, decrease in Hb from 9.3-8.5, and adequate .  CRP remains elevated at 176, magnesium 1.7, and all other values for CMP roughly WNL.  Discussed case at length with pulmonology, patient, and patient's daughter over the phone.    Objective:  Constitutional:       Appearance: Normal appearance.   HENT:      Head: Normocephalic and atraumatic.      Mouth/Throat:      Mouth: Mucous membranes are moist     Pharynx: Oropharynx is clear.   Cardiovascular:      Rate and Rhythm: Normal rate and regular rhythm.      Pulses: Normal pulses.      Heart sounds: Normal heart sounds.   Abdominal:      General: Bowel sounds are normal.      Palpations: Abdomen is soft.   Musculoskeletal:      Cervical back: Normal range of motion and neck supple.   Skin:     General: Skin is warm and dry.   Neurological:      Mental Status: She is alert and oriented to person, place, and time. Mental status is at baseline.     Recent Results (from the past 48 hours)   CBC and differential    Collection Time: 12/01/24  3:57 AM   Result Value Ref Range    WBC 3.48 (L) 4.31 - 10.16 Thousand/uL    RBC 2.98 (L) 3.81 - 5.12 Million/uL    Hemoglobin 9.3 (L) 11.5 - 15.4 g/dL    Hematocrit 29.0 (L) 34.8 - 46.1 %    MCV 97 82 - 98 fL    MCH 31.2 26.8 - 34.3 pg    MCHC 32.1 31.4 - 37.4 g/dL    RDW 16.5 (H) 11.6 - 15.1 %    MPV 9.1 8.9 - 12.7 fL    Platelets 328 149 - 390 Thousands/uL   Basic metabolic panel    Collection Time: 12/01/24  3:57 AM   Result Value Ref Range    Sodium 140 135 - 147 mmol/L    Potassium 3.7 3.5 - 5.3 " mmol/L    Chloride 102 96 - 108 mmol/L    CO2 27 21 - 32 mmol/L    ANION GAP 11 4 - 13 mmol/L    BUN 13 5 - 25 mg/dL    Creatinine 0.84 0.60 - 1.30 mg/dL    Glucose 86 65 - 140 mg/dL    Calcium 9.6 8.4 - 10.2 mg/dL    eGFR 68 ml/min/1.73sq m   C-reactive protein    Collection Time: 12/01/24  3:57 AM   Result Value Ref Range    .2 (H) <3.0 mg/L   Path Slide Review    Collection Time: 12/01/24  3:57 AM   Result Value Ref Range    Case Report       Clinical Pathology Report                         Case: [id number]                                  Authorizing Provider:  Fabby Monzon DO          Collected:           12/01/2024 0357              Ordering Location:     [facility]        Received:            12/01/2024 [address] [place] [address] 4th Floor Med                                                                              Surg Unit                                                                    Pathologist:           Kailee Coker MD                                                                  Specimen:                                                                                               Path Slide       Peripheral blood smear shows moderate normochromic normocytic anemia with rare nRBCs, normal number/morphology of platelets, mild leukopenia, left shifted granulopoiesis.     Evaluated by Kailee Coker M.D.  Interpretation performed at [facility], [address] [place] [id number]     Procalcitonin    Collection Time: 12/01/24  3:57 AM   Result Value Ref Range    Procalcitonin 0.18 <=0.25 ng/ml   B-Type Natriuretic Peptide(BNP)    Collection Time: 12/01/24  3:57 AM   Result Value Ref Range    BNP 96 0 - 100 pg/mL   Vancomycin, random    Collection Time: 12/01/24  6:17 AM   Result Value Ref Range    Vancomycin Rm 13.8 10.0 - 20.0 ug/mL   Blood culture    Collection Time: 12/01/24  4:57 PM    Specimen: Arm, Right; Blood   Result Value Ref Range    Blood Culture  Received in Microbiology Lab. Culture in Progress.    Blood culture    Collection Time: 12/01/24  4:57 PM    Specimen: Arm, Left; Blood   Result Value Ref Range    Blood Culture Received in Microbiology Lab. Culture in Progress.    C-reactive protein    Collection Time: 12/02/24  3:29 AM   Result Value Ref Range    .5 (H) <3.0 mg/L   CBC and differential    Collection Time: 12/02/24  3:29 AM   Result Value Ref Range    WBC 4.00 (L) 4.31 - 10.16 Thousand/uL    RBC 2.74 (L) 3.81 - 5.12 Million/uL    Hemoglobin 8.5 (L) 11.5 - 15.4 g/dL    Hematocrit 26.6 (L) 34.8 - 46.1 %    MCV 97 82 - 98 fL    MCH 31.0 26.8 - 34.3 pg    MCHC 32.0 31.4 - 37.4 g/dL    RDW 16.1 (H) 11.6 - 15.1 %    MPV 8.7 (L) 8.9 - 12.7 fL    Platelets 270 149 - 390 Thousands/uL   Comprehensive metabolic panel    Collection Time: 12/02/24  3:29 AM   Result Value Ref Range    Sodium 140 135 - 147 mmol/L    Potassium 4.0 3.5 - 5.3 mmol/L    Chloride 103 96 - 108 mmol/L    CO2 31 21 - 32 mmol/L    ANION GAP 6 4 - 13 mmol/L    BUN 13 5 - 25 mg/dL    Creatinine 0.84 0.60 - 1.30 mg/dL    Glucose 80 65 - 140 mg/dL    Calcium 9.3 8.4 - 10.2 mg/dL    Corrected Calcium 9.9 8.3 - 10.1 mg/dL    AST 12 (L) 13 - 39 U/L    ALT 10 7 - 52 U/L    Alkaline Phosphatase 38 34 - 104 U/L    Total Protein 6.5 6.4 - 8.4 g/dL    Albumin 3.3 (L) 3.5 - 5.0 g/dL    Total Bilirubin 0.32 0.20 - 1.00 mg/dL    eGFR 68 ml/min/1.73sq m   Magnesium    Collection Time: 12/02/24  3:29 AM   Result Value Ref Range    Magnesium 1.7 (L) 1.9 - 2.7 mg/dL   Procalcitonin    Collection Time: 12/02/24  3:29 AM   Result Value Ref Range    Procalcitonin 0.25 <=0.25 ng/ml     XR chest portable  Result Date: 12/1/2024  Narrative: XR CHEST PORTABLE INDICATION: increased o2 requirements. COMPARISON: CXR 11/29/2024, chest CT 11/24/2024. FINDINGS: Redemonstration of right upper lobe nodule and right upper lobe pneumonia. No pneumothorax or pleural effusion. Normal cardiomediastinal silhouette. Bones  are unremarkable for age. Normal upper abdomen.     Impression: Redemonstration of right upper lobe nodule and right upper lobe pneumonia with no significant change. Workstation performed: [id number]     Bronchoscopy  Result Date: 11/29/2024  Narrative: Table formatting from the original result was not included. [facility] [address] [place] [address] [phone] DATE OF SERVICE: 11/29/24 PHYSICIAN(S): Attending: Teresita Forde DO Fellow: Eliseo Small MD INDICATION: Pneumonia, Acute hypoxic respiratory failure (HCC) POST-OP DIAGNOSIS: See the impression below. PREPROCEDURE: Standard airway preparation completed per respiratory therapy protocol.  Informed consent was obtained. Images reviewed prior to the procedure.  A Time Out was performed. No suspicion or identified risk for TB or other airborne infectious disease; bronchoscopy procedure being performed for diagnostic purposes. PROCEDURE: Bronchoscopy DETAILS OF PROCEDURE: Patient was taken to the procedure room where a time out was performed to confirm correct patient and correct procedure. The patient underwent moderate sedation, which was administered by an anesthesia professional. The patient's ECG, blood pressure, heart rate, oxygen and respirations were monitored throughout the procedure. The patient experienced no blood loss. The scope was introduced through the right naris. The procedure was not difficult. The patient tolerated the procedure well. There were no apparent adverse events. ANESTHESIA INFORMATION: ASA: IV Anesthesia Type: General FINDINGS: Bronchoalveolar lavage was performed x3 in the right posterior segment (RB2) with 180 mL of saline instilled and a total return of 70 mL. The fluid appeared cloudy. The vocal cords, trachea, main ramin, left main stem, left upper lobar bronchus, left apical-posterior segment (LB1+2), left upper anterior segment (LB3), lingular lobar bronchus, superior lingular  segment (LB4), inferior lingular segment (LB5), left lower lobar bronchus, left superior segment (LB6), left anterior basal segment (LB7+8), left lateral basal segment (LB9), left posterior basal segment (LB10), right main stem, right upper lobar bronchus, right apical segment (RB1), right posterior segment (RB2), right anterior segment (RB3), bronchus intermedius, right middle lobar bronchus, right lateral segment (RB4), right medial segment (RB5), right lower lobar bronchus, right superior segment (RB6), right medial basal segment (RB7), right anterior basal segment (RB8), right lateral basal segment (RB9) and right posterior basal segment (RB10) appeared normal. SPECIMENS: ID Type Source Tests Collected by Time Destination 1 :  Lavage Lung, Right Upper Lobe Bronchoalveolar Lavage PULMONARY CYTOLOGY, BRONCHOALVEOLAR LAVAGE (BAL) DIFFERENTIAL Teresita Forde DO 11/29/2024  4:11 PM       Impression: Bronchoalveolar lavage was performed x3 in the right posterior segment (RB2) and the fluid appeared cloudy The vocal cords, trachea, main ramin, left main stem, left upper lobar bronchus, left apical-posterior segment (LB1+2), left upper anterior segment (LB3), lingular lobar bronchus, superior lingular segment (LB4), inferior lingular segment (LB5), left lower lobar bronchus, left superior segment (LB6), left anterior basal segment (LB7+8), left lateral basal segment (LB9), left posterior basal segment (LB10), right main stem, right upper lobar bronchus, right apical segment (RB1), right posterior segment (RB2), right anterior segment (RB3), bronchus intermedius, right middle lobar bronchus, right lateral segment (RB4), right medial segment (RB5), right lower lobar bronchus, right superior segment (RB6), right medial basal segment (RB7), right anterior basal segment (RB8), right lateral basal segment (RB9) and right posterior basal segment (RB10) appeared normal. RECOMMENDATION: Follow up culture results from  Bronchoscopy      XR chest portable  Result Date: 11/29/2024  Narrative: XR CHEST PORTABLE INDICATION: Re-evaluate PE. COMPARISON: Chest radiograph 11/24/2024, 10/16/2024 and CT chest, abdomen pelvis 11/24/2024 FINDINGS: Slight worsening of right upper lobe airspace opacity, partially obscuring known right upper lobe mass. The left lung is grossly clear. No pneumothorax or pleural effusion. Normal cardiomediastinal silhouette. Bones are unremarkable for age. Normal upper abdomen.     Impression: Slight worsening of right upper lobe airspace opacity, partially obscuring known right upper lobe mass. Workstation performed: [id number]     XR chest 2 views  Result Date: 11/25/2024  Narrative: XR CHEST PA AND LATERAL INDICATION: Cough, shortness of breath. COMPARISON: Chest radiograph 10/16/2024. CT PE study 11/24/2024. FINDINGS: Patchy consolidation within the peripheral right upper lobe. Redemonstration of the right upper lobe paramediastinal mass measuring 2.5 x 1.9 cm. No pneumothorax or pleural effusion. Normal cardiomediastinal silhouette. Bones are unremarkable for age. Normal upper abdomen.     Impression: Right upper lobe pneumonia. Redemonstration of the right upper lobe mass, better evaluated on the CT from 11/24/2024. Resident: Sanaz Corona I, the attending radiologist, have reviewed the images and agree with the final report above. Workstation performed: [id number]     Echo complete w/ contrast if indicated  Result Date: 11/25/2024  Narrative:   Left Ventricle: Left ventricular cavity size is normal. Wall thickness is normal. The left ventricular ejection fraction is 65%. Systolic function is normal. Wall motion is normal. Diastolic function is mildly abnormal, consistent with grade I (abnormal) relaxation.   Mitral Valve: There is mild annular calcification.   Tricuspid Valve: There is mild regurgitation.     CT pe study w abdomen pelvis w contrast  Result Date: 11/25/2024  Narrative: CT PULMONARY  ANGIOGRAM OF THE CHEST AND CT ABDOMEN AND PELVIS WITH INTRAVENOUS CONTRAST INDICATION: elevated d-dimer, sob, r/o PE. HX Lung cancer with mets to brain. COMPARISON: CT 10/22/2024, 7/3/2024, PET/CT 3/22/2024 TECHNIQUE: CT examination of the chest, abdomen and pelvis was performed. Thin section CT angiographic technique was used in the chest in order to evaluate for pulmonary embolus and coronal 3D MIP postprocessing was performed on the acquisition scanner. Multiplanar 2D reformatted images were created from the source data. This examination, like all CT scans performed in the [facility], was performed utilizing techniques to minimize radiation dose exposure, including the use of iterative reconstruction and automated exposure control. Radiation dose length product (DLP) for this visit: 369 mGy-cm IV Contrast: 85 mL of iohexol (OMNIPAQUE) Enteric Contrast: Not administered. FINDINGS: CHEST PULMONARY ARTERIAL TREE: Embolism straddles between the middle lobe and right lower lobe basal segment arteries, as seen on series 301/112, this is new from the most recent prior. Additional embolism noted within the left lower lobe basal arteries straddling into the lower lingular segment, as noted on series 301/98, also new from prior. RV diameter at the level of the AV valve = 3. 9 cm LV diameter at the level of the AV valve = 2.7 cm Ratio equals 1.4 LUNGS: There are new patchy airspace opacities seen throughout the right upper lobe Spiculated right upper lobe nodule measures 2.5 x 1.9 cm, similar There is mild diffuse groundglass opacities throughout, likely representing a mild pulmonary edema, previously noted groundglass nodule is predominantly obscured Mild emphysema PLEURA: Unremarkable. HEART/AORTA: Heart is unremarkable for patient's age. No thoracic aortic aneurysm. MEDIASTINUM AND SUDEEP: Unremarkable. CHEST WALL AND LOWER NECK: Unremarkable. ABDOMEN LIVER/BILIARY TREE: Unremarkable. GALLBLADDER: No  calcified gallstones. No pericholecystic inflammatory change. SPLEEN: Unremarkable. PANCREAS: Unremarkable. ADRENAL GLANDS: Unremarkable. KIDNEYS/URETERS: 2 x 1.8 cm hypodense right upper pole lesion, series 306/30, with surrounding hypoenhancing parenchyma, similar to prior 0.6 cm ill-defined area of hypoenhancement within the posterior right lower pole series 306/58, stable from most recent prior 2 mm nonobstructing right upper pole stone STOMACH AND BOWEL: Unremarkable. APPENDIX: No findings to suggest appendicitis. ABDOMINOPELVIC CAVITY: No ascites. No pneumoperitoneum. No lymphadenopathy. VESSELS: Unremarkable for patient's age. PELVIS REPRODUCTIVE ORGANS: Unremarkable for patient's age. URINARY BLADDER: Unremarkable. ABDOMINAL WALL/INGUINAL REGIONS: 4 x 3.6 cm centrally hypodense collection within the right ischio rectal fossa, previously 6.3 cm BONES: No acute fracture or suspicious osseous lesion.     Impression: 1.  Bilateral pulmonary emboli 2.  The calculated ratio of right ventricular to left ventricular diameter (RV/LV ratio) is 1.4. This is greater than 0.9, which is abnormal and indicates right heart strain. An abnormal RV/LV ratio has been shown to be associated with an increased risk of 30 day mortality in the setting of acute pulmonary embolism. 3.  Right upper lobe opacities consistent with pneumonia 4.  Mild pulmonary edema 5.  Stable right upper lobe pulmonary mass 6.  Stable right renal lesion suspicious for metastasis 7.  Decrease in size of mass in the right ischio rectal fossa I personally discussed impression 1-3 with Fitz Burr at 11/25/24 12:44 AM Workstation performed: [id number]     CT head without contrast  Result Date: 11/24/2024  Narrative: CT BRAIN - WITHOUT CONTRAST INDICATION:   fever tachycardia, hx brain mets. COMPARISON: CT brain dated October 20, 2024. MRI brain dated October 21, 2024. TECHNIQUE:  CT examination of the brain was performed.  Multiplanar 2D reformatted  images were created from the source data. Radiation dose length product (DLP) for this visit:  1003 mGy-cm .  This examination, like all CT scans performed in the [facility], was performed utilizing techniques to minimize radiation dose exposure, including the use of iterative reconstruction and automated exposure control. IMAGE QUALITY:  Diagnostic. FINDINGS: PARENCHYMA: Again noted is extensive vasogenic edema at the left parietal and occipital lobes similar to the prior exam in this patient with a known metastatic lesion in this region, poorly visualized on this unenhanced study. There is similar mass effect on the posterior horn of the left lateral ventricle. No midline shift. There is mild periventricular white matter low attenuation which is nonspecific and most likely related to chronic small vessel ischemic changes. No acute intracranial hemorrhage or ischemia. VENTRICLES AND EXTRA-AXIAL SPACES: See above. No hydrocephalus. VISUALIZED ORBITS: Normal visualized orbits. PARANASAL SINUSES: Normal visualized paranasal sinuses. CALVARIUM AND EXTRACRANIAL SOFT TISSUES: Normal.     Impression: Reidentified vasogenic edema at the left parietal and left occipital lobe similar to the prior exam, attributed to the patient's history of brain metastases, poorly visualized on this unenhanced study. There is similar mass effect on the posterior horn of the left lateral ventricle. No midline shift. No acute intracranial abnormality. Mild chronic small vessel ischemic changes. 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    1.  Acute bilateral PE:  Patient transitioned from heparin gtt to Xarelto without bleeding complications.     2.  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  There is some concern that her fever, SOB, PNA, and PE could be related to her immunotherapy.  While this could also be progression of her uncurable disease.  We discussed at length with pulmonology and agreed that increasing her steroids would help differentiate which of her symptoms may be immunotherapy-related and which are not prior to making any decisions regarding whether to continue aggressive measures or whether to pursue comfort measures.  She has outpatient follow up with her primary oncologist, Dr. Castro, scheduled on 12/10/24.     3.  Brain metastasis:  Agree with changing Decadron to Solumedrol per discussions with pulmonology.  See problem 2.  Continue Keppra for seizure prophylaxis.     4.  Pancytopenia:  Improving with WBC increased 3.5 to 4.0, Hb 9.3 to 8.5, and Plt 328 to 270.  Original insult likely chemotherapy-induced.  We will continue to monitor CBC daily and may be able to space out labs soon.     Outpatient follow up plan: Follow up with Dr. Castro in outpatient setting.       Communication with patient/family:  We discussed this plan with the patient, and I called the patient's daughter, Ms. Juliana Camarena, at [phone] to discuss the changes.     Communication with team:  We communicated with primary team.    Additional recommendations to follow per attending, Dr. Kelley.    Xu Alanis DO, PGY4  Hematology/Oncology Fellow

## 2024-12-03 LAB
ALBUMIN SERPL BCG-MCNC: 2.9 G/DL (ref 3.5–5)
ALP SERPL-CCNC: 33 U/L (ref 34–104)
ALT SERPL W P-5'-P-CCNC: 9 U/L (ref 7–52)
ANION GAP SERPL CALCULATED.3IONS-SCNC: 7 MMOL/L (ref 4–13)
ANISOCYTOSIS BLD QL SMEAR: PRESENT
AST SERPL W P-5'-P-CCNC: 10 U/L (ref 13–39)
BASOPHILS # BLD MANUAL: 0 THOUSAND/UL (ref 0–0.1)
BASOPHILS NFR MAR MANUAL: 0 % (ref 0–1)
BILIRUB SERPL-MCNC: 0.25 MG/DL (ref 0.2–1)
BUN SERPL-MCNC: 16 MG/DL (ref 5–25)
CALCIUM ALBUM COR SERPL-MCNC: 9.7 MG/DL (ref 8.3–10.1)
CALCIUM SERPL-MCNC: 8.8 MG/DL (ref 8.4–10.2)
CHLORIDE SERPL-SCNC: 104 MMOL/L (ref 96–108)
CO2 SERPL-SCNC: 30 MMOL/L (ref 21–32)
CREAT SERPL-MCNC: 0.75 MG/DL (ref 0.6–1.3)
CRP SERPL QL: 152.7 MG/L
EOSINOPHIL # BLD MANUAL: 0.04 THOUSAND/UL (ref 0–0.4)
EOSINOPHIL NFR BLD MANUAL: 1 % (ref 0–6)
ERYTHROCYTE [DISTWIDTH] IN BLOOD BY AUTOMATED COUNT: 15.5 % (ref 11.6–15.1)
GFR SERPL CREATININE-BSD FRML MDRD: 78 ML/MIN/1.73SQ M
GLUCOSE SERPL-MCNC: 89 MG/DL (ref 65–140)
HCT VFR BLD AUTO: 23.1 % (ref 34.8–46.1)
HCT VFR BLD AUTO: 26.8 % (ref 34.8–46.1)
HGB BLD-MCNC: 7.5 G/DL (ref 11.5–15.4)
HGB BLD-MCNC: 8.5 G/DL (ref 11.5–15.4)
HYPERCHROMIA BLD QL SMEAR: PRESENT
LDH SERPL-CCNC: 296 U/L (ref 140–271)
LYMPHOCYTES # BLD AUTO: 0.12 THOUSAND/UL (ref 0.6–4.47)
LYMPHOCYTES # BLD AUTO: 3 % (ref 14–44)
MAGNESIUM SERPL-MCNC: 1.9 MG/DL (ref 1.9–2.7)
MCH RBC QN AUTO: 31.1 PG (ref 26.8–34.3)
MCHC RBC AUTO-ENTMCNC: 32.5 G/DL (ref 31.4–37.4)
MCV RBC AUTO: 96 FL (ref 82–98)
METAMYELOCYTE ABSOLUTE CT: 0.08 THOUSAND/UL (ref 0–0.1)
METAMYELOCYTES NFR BLD MANUAL: 2 % (ref 0–1)
MONOCYTES # BLD AUTO: 0.16 THOUSAND/UL (ref 0–1.22)
MONOCYTES NFR BLD: 4 % (ref 4–12)
MYCOBACTERIUM SPEC CULT: NORMAL
MYELOCYTE ABSOLUTE CT: 0.12 THOUSAND/UL (ref 0–0.1)
MYELOCYTES NFR BLD MANUAL: 3 % (ref 0–1)
NEUTROPHILS # BLD MANUAL: 3.44 THOUSAND/UL (ref 1.85–7.62)
NEUTS BAND NFR BLD MANUAL: 6 % (ref 0–8)
NEUTS SEG NFR BLD AUTO: 81 % (ref 43–75)
PLATELET # BLD AUTO: 263 THOUSANDS/UL (ref 149–390)
PLATELET BLD QL SMEAR: ADEQUATE
PMV BLD AUTO: 8.7 FL (ref 8.9–12.7)
PNEUMOCYSTIS PCR: POSITIVE
POTASSIUM SERPL-SCNC: 3.7 MMOL/L (ref 3.5–5.3)
PROT SERPL-MCNC: 5.8 G/DL (ref 6.4–8.4)
RBC # BLD AUTO: 2.41 MILLION/UL (ref 3.81–5.12)
RBC MORPH BLD: PRESENT
RHODAMINE-AURAMINE STN SPEC: NORMAL
SODIUM SERPL-SCNC: 141 MMOL/L (ref 135–147)
WBC # BLD AUTO: 3.95 THOUSAND/UL (ref 4.31–10.16)

## 2024-12-03 PROCEDURE — 85014 HEMATOCRIT: CPT

## 2024-12-03 PROCEDURE — 85007 BL SMEAR W/DIFF WBC COUNT: CPT

## 2024-12-03 PROCEDURE — 86140 C-REACTIVE PROTEIN: CPT

## 2024-12-03 PROCEDURE — 88305 TISSUE EXAM BY PATHOLOGIST: CPT | Performed by: PATHOLOGY

## 2024-12-03 PROCEDURE — 99232 SBSQ HOSP IP/OBS MODERATE 35: CPT | Performed by: INTERNAL MEDICINE

## 2024-12-03 PROCEDURE — 83615 LACTATE (LD) (LDH) ENZYME: CPT | Performed by: INTERNAL MEDICINE

## 2024-12-03 PROCEDURE — 99233 SBSQ HOSP IP/OBS HIGH 50: CPT | Performed by: INTERNAL MEDICINE

## 2024-12-03 PROCEDURE — 80053 COMPREHEN METABOLIC PANEL: CPT

## 2024-12-03 PROCEDURE — 85027 COMPLETE CBC AUTOMATED: CPT

## 2024-12-03 PROCEDURE — 88112 CYTOPATH CELL ENHANCE TECH: CPT | Performed by: PATHOLOGY

## 2024-12-03 PROCEDURE — 83735 ASSAY OF MAGNESIUM: CPT

## 2024-12-03 PROCEDURE — 85018 HEMOGLOBIN: CPT

## 2024-12-03 PROCEDURE — 87449 NOS EACH ORGANISM AG IA: CPT | Performed by: INTERNAL MEDICINE

## 2024-12-03 PROCEDURE — 99232 SBSQ HOSP IP/OBS MODERATE 35: CPT | Performed by: FAMILY MEDICINE

## 2024-12-03 RX ORDER — ATOVAQUONE 750 MG/5ML
750 SUSPENSION ORAL 2 TIMES DAILY WITH MEALS
Status: DISCONTINUED | OUTPATIENT
Start: 2024-12-03 | End: 2024-12-06 | Stop reason: HOSPADM

## 2024-12-03 RX ORDER — PREDNISONE 20 MG/1
40 TABLET ORAL 2 TIMES DAILY WITH MEALS
Status: DISCONTINUED | OUTPATIENT
Start: 2024-12-03 | End: 2024-12-05

## 2024-12-03 RX ORDER — SENNOSIDES 8.6 MG
1 TABLET ORAL
Status: DISCONTINUED | OUTPATIENT
Start: 2024-12-03 | End: 2024-12-06 | Stop reason: HOSPADM

## 2024-12-03 RX ADMIN — RIVAROXABAN 15 MG: 15 TABLET, FILM COATED ORAL at 16:26

## 2024-12-03 RX ADMIN — PANTOPRAZOLE SODIUM 40 MG: 40 TABLET, DELAYED RELEASE ORAL at 05:51

## 2024-12-03 RX ADMIN — ACETAMINOPHEN 650 MG: 325 TABLET, FILM COATED ORAL at 00:09

## 2024-12-03 RX ADMIN — METHYLPREDNISOLONE SODIUM SUCCINATE 40 MG: 40 INJECTION, POWDER, FOR SOLUTION INTRAMUSCULAR; INTRAVENOUS at 08:18

## 2024-12-03 RX ADMIN — PREDNISONE 40 MG: 20 TABLET ORAL at 16:25

## 2024-12-03 RX ADMIN — ACETAMINOPHEN 650 MG: 325 TABLET, FILM COATED ORAL at 05:51

## 2024-12-03 RX ADMIN — RIVAROXABAN 15 MG: 15 TABLET, FILM COATED ORAL at 08:18

## 2024-12-03 RX ADMIN — GABAPENTIN 100 MG: 100 CAPSULE ORAL at 05:50

## 2024-12-03 RX ADMIN — CYANOCOBALAMIN TAB 500 MCG 1000 MCG: 500 TAB at 08:18

## 2024-12-03 RX ADMIN — LEVETIRACETAM 1000 MG: 250 TABLET, FILM COATED ORAL at 08:19

## 2024-12-03 RX ADMIN — ACETAMINOPHEN 650 MG: 325 TABLET, FILM COATED ORAL at 17:53

## 2024-12-03 RX ADMIN — LEVOTHYROXINE SODIUM 50 MCG: 0.05 TABLET ORAL at 05:51

## 2024-12-03 RX ADMIN — VANCOMYCIN HYDROCHLORIDE 1250 MG: 5 INJECTION, POWDER, LYOPHILIZED, FOR SOLUTION INTRAVENOUS at 08:19

## 2024-12-03 RX ADMIN — LEVETIRACETAM 1000 MG: 250 TABLET, FILM COATED ORAL at 21:03

## 2024-12-03 RX ADMIN — GABAPENTIN 100 MG: 100 CAPSULE ORAL at 10:43

## 2024-12-03 RX ADMIN — Medication 1 PACKET: at 08:18

## 2024-12-03 RX ADMIN — GABAPENTIN 300 MG: 300 CAPSULE ORAL at 21:03

## 2024-12-03 RX ADMIN — FOLIC ACID 1 MG: 1 TABLET ORAL at 08:19

## 2024-12-03 RX ADMIN — ATOVAQUONE 750 MG: 750 SUSPENSION ORAL at 16:26

## 2024-12-03 NOTE — PLAN OF CARE
Problem: Potential for Falls  Goal: Patient will remain free of falls  Description: INTERVENTIONS:  - Educate patient/family on patient safety including physical limitations  - Instruct patient to call for assistance with activity   - Consult OT/PT to assist with strengthening/mobility   - Keep Call bell within reach  - Keep bed low and locked with side rails adjusted as appropriate  - Keep care items and personal belongings within reach  - Initiate and maintain comfort rounds  - Make Fall Risk Sign visible to staff  - Offer Toileting every 2 Hours, in advance of need  - Initiate/Maintain bed/chair alarm  - Obtain necessary fall risk management equipment: yellow bracelet/socks  - Apply yellow socks and bracelet for high fall risk patients  - Consider moving patient to room near nurses station  Outcome: Progressing     Problem: INFECTION - ADULT  Goal: Absence or prevention of progression during hospitalization  Description: INTERVENTIONS:  - Assess and monitor for signs and symptoms of infection  - Monitor lab/diagnostic results  - Monitor all insertion sites, i.e. indwelling lines, tubes, and drains  - Monitor endotracheal if appropriate and nasal secretions for changes in amount and color  - [place] appropriate cooling/warming therapies per order  - Administer medications as ordered  - Instruct and encourage patient and family to use good hand hygiene technique  - Identify and instruct in appropriate isolation precautions for identified infection/condition  Outcome: Progressing     Problem: SAFETY ADULT  Goal: Patient will remain free of falls  Description: INTERVENTIONS:  - Educate patient/family on patient safety including physical limitations  - Instruct patient to call for assistance with activity   - Consult OT/PT to assist with strengthening/mobility   - Keep Call bell within reach  - Keep bed low and locked with side rails adjusted as appropriate  - Keep care items and personal belongings within  reach  - Initiate and maintain comfort rounds  - Make Fall Risk Sign visible to staff  - Offer Toileting every 2 Hours, in advance of need  - Initiate/Maintain bed/chair alarm  - Obtain necessary fall risk management equipment: yellow bracelet/socks  - Apply yellow socks and bracelet for high fall risk patients  - Consider moving patient to room near nurses station  Outcome: Progressing     Problem: Knowledge Deficit  Goal: Patient/family/caregiver demonstrates understanding of disease process, treatment plan, medications, and discharge instructions  Description: Complete learning assessment and assess knowledge base.  Interventions:  - Provide teaching at level of understanding  - Provide teaching via preferred learning methods  Outcome: Progressing     Problem: RESPIRATORY - ADULT  Goal: Achieves optimal ventilation and oxygenation  Description: INTERVENTIONS:  - Assess for changes in respiratory status  - Assess for changes in mentation and behavior  - Position to facilitate oxygenation and minimize respiratory effort  - Oxygen administered by appropriate delivery if ordered  - Initiate smoking cessation education as indicated  - Encourage broncho-pulmonary hygiene including cough, deep breathe, Incentive Spirometry  - Assess the need for suctioning and aspirate as needed  - Assess and instruct to report SOB or any respiratory difficulty  - Respiratory Therapy support as indicated  Outcome: Progressing     Problem: HEMATOLOGIC - ADULT  Goal: Maintains hematologic stability  Description: INTERVENTIONS  - Assess for signs and symptoms of bleeding or hemorrhage  - Monitor labs  - Administer supportive blood products/factors as ordered and appropriate  Outcome: Progressing

## 2024-12-03 NOTE — PROGRESS NOTES
Progress Note - Hospitalist   Name: Ayla Nye 74 y.o. female I MRN: [id number]  Unit/Bed#: S -01 I Date of Admission: 2024   Date of Service: 12/3/2024 I Hospital Day: 8    Assessment & Plan  Malignant neoplasm of upper lobe, right bronchus or lung (HCC)  Stage III NSCLC, new onset brain mets 24  Pt is on immunotherapy ketruda and carboplatin last dose    Oncologist Dr. Castro with [facility].    Radiologist Dr. Gamboa with [facility]    OP Pembrolizumab + PEMEtrexed + CARBOplatin Q 21 Days Day 1 Cycle 3 due [id number]  Last dose     PLAN:  Onc consulted and following  - attempted to reach out to Dr. Castro  to coordinate care. She said to put the lung cancer on the back burner and to make the pneumonia and PE the focus at this time, with input from ID. No further recs.   - attempted to reach out to Dr. 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   - on AM of , patient had hemoglobin of 6.6. no coughing up blood, or blood in stool or urine. Only slight bleeding with blowing her nose that she notes. Prepared and gave 1 unit of leukoreduced PRBCs. Repeat H&H afterwards was 9.2.  Transfuse if Hb < 7      Acute pulmonary embolism (HCC)  PERC 2  WELLS 2.5  PESI 104 Class III Intermediate risk   Imagin: CT pe study w abdomen pelvis w contrast  PULMONARY ARTERIAL TREE: Embolism straddles between the middle lobe and right lower lobe basal segment arteries, as seen on series 301/112, this is new from the most recent prior.   Additional embolism noted within the left lower lobe basal  "arteries straddling into the lower lingular segment, as noted on series 301/98, also new from prior.  RV diameter at the level of the AV valve = 3. 9 cm  LV diameter at the level of the AV valve = 2.7 cm  Ratio equals 1.4  CLASSIFICATION Acute hemodynamically stable bilateral right sided segmental left basilar associated with SOB most likely provoked from cancer hx while on immunotherapy   ECHO 11/25: EF 65%, diastolic dysfunction grade I. Mitral valve has mild annular calcification and tricuspid valve has mild regurgitation.     PLAN  Xarelto started 11/28, heparin drip stopped. Price check performed and acceptable for patient. Plan will be this dose for 21 days, then 20 mg daily for 6 months, then 10 mg indefinitely.     Pneumonia    Recent Labs     12/01/24  0357 12/02/24  0329 12/03/24  0559   WBC 3.48* 4.00* 3.95*     Recent Labs     12/01/24  0357 12/02/24  0329   CREATININE 0.84 0.84   EGFR 68 68     Estimated Creatinine Clearance: 46.5 mL/min (by C-G formula based on SCr of 0.84 mg/dL).  No results for input(s): \"LACTICACID\" in the last 72 hours.    Recent Labs     12/01/24  0357 12/02/24  0329   PROCALCITONI 0.18 0.25       Vitals:    12/02/24 2303   BP: 141/75   Pulse: 69   Resp:    Temp:    SpO2: 95%     Temperature: 97.7 °F (36.5 °C)  Temp Source: Oral        Recent Labs     12/01/24  1657   BLOODCX No Growth at 24 hrs.  No Growth at 24 hrs.       CT pe study w abdomen pelvis w contrast  Result Date: 11/25/2024  Right upper lobe opacities consistent with pneumonia 4.  Mild pulmonary edema       DEFINITIONS   SIRS: two of the following that cannot be better explained by another cause  HR:>90/min  and WBC: <4x109/L (<4000/mm3), >12x109/L (>12,000/mm3), or =10% bands  SEPSIS  SIRS AND  Confirmed OR Suspected infection Lungs  qSOFA=0  Blood cultures negative at 72 hours  Procal continues to be negative  MRSA swab negative, vanc stopped 11/27      PLAN:  LABS:   Trend CBC and fever as markers of " ongoing infection  Pneumococcal Antibody 23 ordered, to be followed up outpatient. Ordered prevnar 20 vaccine her for which was administered on 11/27.  Bronchoscopy results so far: Bronchoalveolar lavage: 2% neutrophils. 58% lymphs, 40% macrophage, total count 100, bronchial gram stain 1+ polys, no bacteria seen. AFB culture with stain negative for acid fast bacilli. Fungal cx no growth, virus cx negative at 24 hours, positive for pneumocystis PCR    REASSESS DAILY: Reassess pt response to txt daily. Document improvement or worsening status. Patient had oxygen saturation as low as 86% on room air while awake on 11/28. It went as high as 91% but did not maintain. Ordered her nasal cannula oxygen to improve this to >90%, but it was never given. 11/29 patient saturating to low 90s on room air. Oxygen added 11/29. Currently on 4 L nasal cannula.   Given tessalon perles PRN for cough.   Repeated chest x-ray 11/29 which showed worsening right upper lobe pneumonia. Consulted pulmonology and had bronchoscopy 11/29. Pending tests: legionella culture, aspergillus antigen, Fungitell    Treatment medications  Levoquin 750mg IV q48hrs (renal dose) Given one dose 24NOV24, stopped and switched to ceftriaxone 1,000 mg Q24Hr on 11/25. Stopped 11/29.  Vanc 750mg IV q12hrs Started 23:00 24NOV24. Stopped on 11/27 due to negative MRSA cx  Duration 7-10 days (11/30 is day 7)  Current regimen per pulm recs: cefepime and vanc started 11/29  ID consulted: stopped cefepime on 12/2, plan for vancomycin through 12/5.   Given one dose today of solumedrol per pulm, now switched to prednisone 40 mg BID due to positive pneumocystis PCR, start date 12/3. Plan will be this dose for 5 days, then decreased from BID to daily, for 5 more days, then switch to 20 mg daily until the end of course.   ID also added atovaquone 750 mg BID for the Pneumocystis pneumonia    NUTRITION:   Good nutrition and tight glucose management   No current benefit of IV Vit  C  Neutropenia (HCC)      Component      Latest Ref Rng 2/16/2024 3/12/2024 4/5/2024 4/30/2024 5/20/2024   Absolute Neutrophils      1.85 - 7.62 Thousand/uL 6.06  5.47  4.95  5.71  5.87      Component      Latest Ref Rng 5/28/2024 6/3/2024 6/10/2024 6/18/2024 6/22/2024   Absolute Neutrophils      1.85 - 7.62 Thousand/uL 5.89  6.31  5.21  3.42  3.63      Component      Latest Ref Rng 6/23/2024 6/24/2024 7/1/2024 7/2/2024 7/3/2024 7/15/2024   Absolute Neutrophils      1.85 - 7.62 Thousand/uL 2.38  1.58 (L)  1.81 (L)  2.08  1.83 (L)  2.08      Component      Latest Ref Rng 7/29/2024 8/21/2024 8/22/2024 8/23/2024 8/24/2024   Absolute Neutrophils      1.85 - 7.62 Thousand/uL 2.95  6.57  5.54  6.22  4.46      Component      Latest Ref Rng 9/9/2024 9/10/2024 9/11/2024 9/12/2024 9/13/2024   Absolute Neutrophils      1.85 - 7.62 Thousand/uL 5.00  3.22  5.80  4.26  7.84 (H)      Component      Latest Ref Rng 10/3/2024 10/16/2024 10/21/2024 10/22/2024 10/27/2024   Absolute Neutrophils      1.85 - 7.62 Thousand/uL 7.17  1.58 (L)  2.79  4.39  9.78 (H)      Component      Latest Ref Rng 11/11/2024 11/24/2024   Absolute Neutrophils      1.85 - 7.62 Thousand/uL 9.40 (H)  1.54 (L)       Recent Labs     12/02/24  0329   AST 12*   ALT 10   ALKPHOS 38   TBILI 0.32         Fever at home 101.3  Urine culture 11/24: < 10,000 cfu/mL mixed contaminants x2  PLAN:  Antibiotics cefepime and Vanc switched on 11/29 from ceftriaxone due to minimal improvement. Cefepime stopped 12/2.   -Oncology consulted  - Pulm consulted: trial of solumedrol 40 mg daily   -ID consulted: continue vanc through 12/5  Neutropenic precautions  Sepsis, unspecified organism (HCC)  Patient reached sepsis criteria AM of 11/26 due to fevers, elevated wbc count, and HR >90. LA ordered and normal. Procal continues to be normal. Will continue to monitor with antibiotics for penumonia, and heparin drip for PE. Will continue to get daily labs.   AM of 11/29: patient no longer  septic due to no fevers in the past 24 hours, but still having mild temperature elevations and Hrs in the 90s. Will monitor.   12/3: no fevers in the past 2 days, Hrs 80s-90s, continues to need oxygen via nasal cannula, 4 L.  Acute hypoxic respiratory failure (HCC)  Patient requiring oxygen 4 L via nasal cannula currently. Oxygen needs likely due to the setting of pneumonia and pulmonary embolism. Also had recent bronchoscopy which can increase oxygen needs initially afterwards.     VTE Pharmacologic Prophylaxis: VTE Score: 9 High Risk (Score >/= 5) - Pharmacological DVT Prophylaxis Ordered: rivaroxaban (Xarelto). Sequential Compression Devices Ordered.    Mobility:   Basic Mobility Inpatient Raw Score: 24  JH-HLM Goal: 8: Walk 250 feet or more  JH-HLM Achieved: 8: Walk 250 feet ot more  JH-HLM Goal achieved. Continue to encourage appropriate mobility.    Patient Centered Rounds: I performed bedside rounds with nursing staff today.   Discussions with Specialists or Other Care Team Provider: pulm, ID,     Education and Discussions with Family / Patient: Updated  (daughter) via phone.    Current Length of Stay: 8 day(s)  Current Patient Status: Inpatient   Certification Statement: The patient will continue to require additional inpatient hospital stay due to treatment for pneumonia  Discharge Plan: Anticipate discharge in 48-72 hrs to discharge location to be determined pending rehab evaluations.    Code Status: Level 1 - Full Code    Subjective   Patient seen at bedside this morning. She states compared to yesterday she feels little better. She still gets short of breath when exerting herself but feels okay when resting. She brought up the idea of her chemotherapy ketruda causing a pneumonitis as a previous medication caused her to have pulmonary side effects after several treatments. I discussed with her that this is a possibility, and we will know more depending on how she responds to her current  antibiotics. Dr. Castro was reached out to yesterday and she stated that the lung cancer is on the back burner at this time as patient's more urgent needs as to focus on the PE and pneumonia.      Objective :  Temp:  [97.7 °F (36.5 °C)-98.2 °F (36.8 °C)] 97.7 °F (36.5 °C)  HR:  [69-96] 69  BP: (128-141)/(68-83) 141/75  Resp:  [16-18] 16  SpO2:  [95 %-97 %] 95 %  O2 Device: Nasal cannula  Nasal Cannula O2 Flow Rate (L/min):  [2 L/min] 2 L/min    Body mass index is 20.85 kg/m².     Input and Output Summary (last 24 hours):     Intake/Output Summary (Last 24 hours) at 12/3/2024 0623  Last data filed at 12/2/2024 0900  Gross per 24 hour   Intake 220 ml   Output --   Net 220 ml       Physical Exam  Constitutional:       General: She is not in acute distress.     Appearance: Normal appearance. She is not ill-appearing.   HENT:      Head: Normocephalic and atraumatic.      Right Ear: External ear normal.      Left Ear: External ear normal.      Nose: Nose normal.      Comments: Nasal cannula in place     Mouth/Throat:      Mouth: Mucous membranes are moist.      Pharynx: Oropharynx is clear.   Eyes:      Conjunctiva/sclera: Conjunctivae normal.   Cardiovascular:      Rate and Rhythm: Normal rate and regular rhythm.      Pulses: Normal pulses.      Heart sounds: Normal heart sounds.   Pulmonary:      Effort: Pulmonary effort is normal.      Breath sounds: Normal breath sounds. No wheezing, rhonchi or rales.   Abdominal:      General: Abdomen is flat. Bowel sounds are normal. There is no distension.      Palpations: Abdomen is soft.      Tenderness: There is no abdominal tenderness.   Musculoskeletal:         General: No swelling.   Skin:     General: Skin is warm and dry.      Capillary Refill: Capillary refill takes less than 2 seconds.   Neurological:      General: No focal deficit present.      Mental Status: She is alert and oriented to person, place, and time.   Psychiatric:         Mood and Affect: Mood normal.          Behavior: Behavior normal.         Lab Results: I have reviewed the following results:   Results from last 7 days   Lab Units 12/03/24  0559 12/02/24  0329 12/01/24  0357 11/29/24  0446 11/28/24  0501   WBC Thousand/uL 3.95*   < > 3.48*   < > 3.20*   HEMOGLOBIN g/dL 7.5*   < > 9.3*   < > 7.3*   HEMATOCRIT % 23.1*   < > 29.0*   < > 22.6*   PLATELETS Thousands/uL 263   < > 328   < > 190   BANDS PCT %  --   --  9*   < >  --    SEGS PCT %  --   --   --   --  76*   LYMPHO PCT %  --   --  5*   < > 5*   MONO PCT %  --   --  5   < > 8   EOS PCT %  --   --  0   < > 2    < > = values in this interval not displayed.     Results from last 7 days   Lab Units 12/02/24  0329   SODIUM mmol/L 140   POTASSIUM mmol/L 4.0   CHLORIDE mmol/L 103   CO2 mmol/L 31   BUN mg/dL 13   CREATININE mg/dL 0.84   ANION GAP mmol/L 6   CALCIUM mg/dL 9.3   ALBUMIN g/dL 3.3*   TOTAL BILIRUBIN mg/dL 0.32   ALK PHOS U/L 38   ALT U/L 10   AST U/L 12*   GLUCOSE RANDOM mg/dL 80                 Results from last 7 days   Lab Units 12/02/24  0329 12/01/24  0357 11/29/24  0436 11/26/24  0928   LACTIC ACID mmol/L  --   --   --  1.2   PROCALCITONIN ng/ml 0.25 0.18 0.21  --        Recent Cultures (last 7 days):   Results from last 7 days   Lab Units 12/01/24  1657 11/29/24  1612   BLOOD CULTURE  No Growth at 24 hrs.  No Growth at 24 hrs.  --    GRAM STAIN RESULT   --  1+ Polys  No bacteria seen       Imaging Results Review: No pertinent imaging studies reviewed.  Other Study Results Review: No additional pertinent studies reviewed.    Last 24 Hours Medication List:     Current Facility-Administered Medications:     acetaminophen (TYLENOL) tablet 650 mg, Q6H KRISS    aluminum-magnesium hydroxide-simethicone (MAALOX) oral suspension 30 mL, Q6H PRN    benzonatate (TESSALON PERLES) capsule 200 mg, TID PRN    cyanocobalamin (VITAMIN B-12) tablet 1,000 mcg, Daily    folic acid (FOLVITE) tablet 1 mg, Daily    gabapentin (NEURONTIN) capsule 100 mg, BID before  breakfast/lunch    gabapentin (NEURONTIN) capsule 300 mg, HS    ibuprofen (MOTRIN) tablet 400 mg, Q6H PRN    lactobacillus acidophilus-bulgaricus (FLORANEX) packet 1 packet, Daily    levETIRAcetam (KEPPRA) tablet 1,000 mg, Q12H KRISS    levothyroxine tablet 50 mcg, Early Morning    LORazepam (ATIVAN) injection 1 mg, Q8H PRN    LORazepam (ATIVAN) injection 2 mg, Q8H PRN    methylPREDNISolone sodium succinate (Solu-MEDROL) injection 40 mg, Daily    ondansetron (ZOFRAN) injection 4 mg, Q6H PRN    pantoprazole (PROTONIX) EC tablet 40 mg, Early Morning    rivaroxaban (XARELTO) tablet 15 mg, BID With Meals    senna (SENOKOT) tablet 8.6 mg, HS PRN    vancomycin (VANCOCIN) 1000 mg in sodium chloride 0.9% 250 mL IVPB, Q24H    Facility-Administered Medications Ordered in Other Encounters:     fentaNYL injection, PRN    midazolam (VERSED) injection, PRN    Administrative Statements   Today, Patient Was Seen By: Fabby Monzon DO  I have spent a total time of 60 minutes in caring for this patient on the day of the visit/encounter including Diagnostic results, Prognosis, Risks and benefits of tx options, Instructions for management, Patient and family education, Importance of tx compliance, Risk factor reductions, Impressions, Counseling / Coordination of care, Documenting in the medical record, Reviewing / ordering tests, medicine, procedures  , Obtaining or reviewing history  , and Communicating with other healthcare professionals .    **Please Note: This note may have been constructed using a voice recognition system.**

## 2024-12-03 NOTE — PROGRESS NOTES
Progress Note - Pulmonology   Name: Ayla Nye 74 y.o. female I MRN: [id number]  Unit/Bed#: S -01 I Date of Admission: 11/24/2024   Date of Service: 12/3/2024 I Hospital Day: 8     Ayla Nye is 74-year-old male with stage IV lung cancer who presented with shortness of breath and concern for RUL pneumonia who failed to improve with Rocephin and bronchoscopy was performed with broadening of antibiotics but BAL studies with lymphocytic predominant fluid and was started on steroids for possible radiation pneumonitis or .  Assessment & Plan  Acute hypoxic respiratory failure (HCC)  Worsening right upper lobe infiltrates likely secondary to inflammatory process and less likely infectious at this time, differential includes COPD, radiation pneumonitis but cannot exclude lymphangitic spread of carcinoma though this is less likely  CXR 11/29/2024 with increased haziness of right upper lung compared to admit CXR, CT chest on admit showed right upper lobe consolidations that appear to be consistent with pneumonia  Persistent fevers on Rocephin, s/p bronchoscopy 11/29 with lymphocyte predominant BAL and cultures with no growth to date, pneumocystis PCR and Aspergillus pending  Wean oxygen for goal sats >90%, started steroids at 40 mg IV Solu-Medrol and will assess response  Pneumonia  Noted initially on admit CXR and CT chest, MRSA swab negative  Has been on Rocephin with continued fevers and CXR on 11/29 with worsening RUL infiltrates  Bronchoscopy 11/29 with cultures showing no growth  CRP 22.7 on 11/11, CRP trend since 11/29 172--146--170--175  Fevers resolved after escalating to Vanco/cefepime but BAL showed lymphocyte predominant fluid and no organisms identified on Gram stain  Completing 5 days of ABX today though growing concerned this is a inflammatory and noninfectious process  Acute pulmonary embolism (HCC)  Likely provoked in setting of known adeno-NSCLC with brain metastases  Continue  Xarelto, will likely need lifelong given metastatic cancer  Malignant neoplasm of upper lobe, right bronchus or lung (HCC)  Follows with Dr. Penn, currently on cycle 2 of pemetrexed/carboplatin/pembrolizumab with last infusion 11/14/2024  Neutropenia (HCC)  Insetting of chemotherapy, may be masking typical immune response explaining negative procalcitonin and low WBC    24 Hour Events : No acute events overnight  Subjective : She is feeling okay today and is starting to feel little better.  She denies any worsening cough or sputum production but still has a bit of a dry cough.  She denies any new chest pain or fevers.  Discussed plan to initiate high-dose steroids today and see if she improves.  Discussed possible etiologies for her worsening respiratory failure and explained rationale for starting steroids.  Discussed that we will assess her response to the hospital and if she does have improvement we will plan for prolonged taper.    Objective :  Temp:  [97.4 °F (36.3 °C)-97.7 °F (36.5 °C)] 97.4 °F (36.3 °C)  HR:  [69-82] 69  BP: (128-148)/(75-90) 148/90  Resp:  [16-18] 18  SpO2:  [95 %-97 %] 95 %    Physical Exam  Vitals reviewed.   Constitutional:       General: She is not in acute distress.     Appearance: She is not toxic-appearing.   Cardiovascular:      Rate and Rhythm: Normal rate and regular rhythm.      Heart sounds: No murmur heard.  Pulmonary:      Effort: Pulmonary effort is normal. No respiratory distress.      Breath sounds: No wheezing or rales.   Neurological:      Mental Status: She is alert.           Lab Results: I have reviewed the following results:   .     12/03/24  0559   WBC 3.95*   HGB 7.5*   HCT 23.1*      BANDSPCT 6   SODIUM 141   K 3.7      CO2 30   BUN 16   CREATININE 0.75   GLUC 89   MG 1.9   AST 10*   ALT 9   ALB 2.9*   TBILI 0.25   ALKPHOS 33*     ABG: No new results in last 24 hours.    Eliseo Small M.D.  Pulmonary & Critical Care Fellow, PGY-IV

## 2024-12-03 NOTE — ASSESSMENT & PLAN NOTE
Noted initially on admit CXR and CT chest, MRSA swab negative  Has been on Rocephin with continued fevers and CXR on 11/29 with worsening RUL infiltrates  Bronchoscopy 11/29 with cultures showing no growth  CRP 22.7 on 11/11, CRP trend since 11/29 172--146--170--175  Fevers resolved after escalating to Vanco/cefepime but BAL showed lymphocyte predominant fluid and no organisms identified on Gram stain  Completing 5 days of ABX today though growing concerned this is a inflammatory and noninfectious process

## 2024-12-03 NOTE — ASSESSMENT & PLAN NOTE
Component      Latest Ref Rng 2/16/2024 3/12/2024 4/5/2024 4/30/2024 5/20/2024   Absolute Neutrophils      1.85 - 7.62 Thousand/uL 6.06  5.47  4.95  5.71  5.87      Component      Latest Ref Rng 5/28/2024 6/3/2024 6/10/2024 6/18/2024 6/22/2024   Absolute Neutrophils      1.85 - 7.62 Thousand/uL 5.89  6.31  5.21  3.42  3.63      Component      Latest Ref Rng 6/23/2024 6/24/2024 7/1/2024 7/2/2024 7/3/2024 7/15/2024   Absolute Neutrophils      1.85 - 7.62 Thousand/uL 2.38  1.58 (L)  1.81 (L)  2.08  1.83 (L)  2.08      Component      Latest Ref Rng 7/29/2024 8/21/2024 8/22/2024 8/23/2024 8/24/2024   Absolute Neutrophils      1.85 - 7.62 Thousand/uL 2.95  6.57  5.54  6.22  4.46      Component      Latest Ref Rng 9/9/2024 9/10/2024 9/11/2024 9/12/2024 9/13/2024   Absolute Neutrophils      1.85 - 7.62 Thousand/uL 5.00  3.22  5.80  4.26  7.84 (H)      Component      Latest Ref Rng 10/3/2024 10/16/2024 10/21/2024 10/22/2024 10/27/2024   Absolute Neutrophils      1.85 - 7.62 Thousand/uL 7.17  1.58 (L)  2.79  4.39  9.78 (H)      Component      Latest Ref Rng 11/11/2024 11/24/2024   Absolute Neutrophils      1.85 - 7.62 Thousand/uL 9.40 (H)  1.54 (L)       Recent Labs     12/02/24  0329   AST 12*   ALT 10   ALKPHOS 38   TBILI 0.32         Fever at home 101.3  Urine culture 11/24: < 10,000 cfu/mL mixed contaminants x2  PLAN:  Antibiotics cefepime and Vanc switched on 11/29 from ceftriaxone due to minimal improvement. Cefepime stopped 12/2.   -Oncology consulted  - Pulm consulted: trial of solumedrol 40 mg daily   -ID consulted: continue vanc through 12/5  Neutropenic precautions

## 2024-12-03 NOTE — ASSESSMENT & PLAN NOTE
Blood Pressure: 141/75  Recent Labs     12/01/24  0357 12/02/24  0329 12/03/24  0559   HGB 9.3* 8.5* 7.5*   BL 8-11    Plan:  -monitor, on Xarelto.   - on AM of 11/29, patient had hemoglobin of 6.6. no coughing up blood, or blood in stool or urine. Only slight bleeding with blowing her nose that she notes. Prepared and gave 1 unit of leukoreduced PRBCs. Repeat H&H afterwards was 9.2.  Transfuse if Hb < 7

## 2024-12-03 NOTE — PROGRESS NOTES
Progress Note - Infectious Disease   Ayla Nye 74 y.o. female MRN: [id number]  Unit/Bed#: S -01 Encounter: [id number]      Impression/Plan:  Right upper lobe infiltrate suspicious for pneumonia: Immunosuppressed patient with lung cancer on chemotherapy who presented with nonproductive cough, night sweats fevers and chills.  Found to have right upper lobe consolidation suspicious for pneumonia.  Urine culture negative.  Flu COVID negative.  MRSA culture negative.  Initially on ceftriaxone and vancomycin, broadened to cefepime and vancomycin due to continued fevers and a chest x-ray on 11/29 showing worsening right upper lobe infiltrate.  Pulmonology was consulted and a bronchoscopy was done on 11/29 to evaluate for MDR.  Bronc cultures so far have been negative.  BAL differential showing lymphocytic predominant fluid.  Patient has been afebrile since 11/30 after escalation to cefepime and vancomycin. Discontinued cefepime 12/2.  Blood cultures show no growth to date at 24 hours. Differentials include CAP 2/2 immunosuppression vs radiation or chemo induced pneumonitis vs .  Patient was started on steroids with Solu-Medrol 40 mg and she notes some improvement in her breathing, currently down to 2 L O2.  Continue vancomycin for now, consider discontinuing it pending patient improvement  Agree with steroids for pneumonitis  Follow-up bronchoscopy fungal culture, virus culture Legionella culture, AFB, PJP PCR studies  Follow-up 12/1 blood cultures  Bilateral pulmonary embolism: CTA chest on 11/24 showing bilateral pulmonary embolism with RV to LV ratio 1.4.  Patient transitioned to Xarelto which will likely be lifelong due to metastatic cancer per pulmonology.  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  Follows with Dr. Castro at [facility] oncology.  Has received radiation for brain mets August 2024.  Patient has  been on Decadron daily which could be masking peripheral eosinophilia that could be associated with Keytruda induced pneumonitis.     Leukopenia/anemia: likely in the setting of chemotherapy    Antibiotics:  Vancomycin    24 Hour Events:  None    Subjective:  Patient dates that she feels slightly better in terms of her breathing.  She is currently requiring 2 L O2 to maintain saturations above 93%.  No chest pain.  She still continues to have a nonproductive cough but she states that this is getting better as well.    Objective:  Vitals:  Temp:  [97.4 °F (36.3 °C)-97.7 °F (36.5 °C)] 97.4 °F (36.3 °C)  HR:  [69-82] 69  Resp:  [16-18] 18  BP: (128-148)/(75-90) 148/90  SpO2:  [95 %-97 %] 95 %  Temp (24hrs), Av.6 °F (36.4 °C), Min:97.4 °F (36.3 °C), Max:97.7 °F (36.5 °C)  Current: Temperature: (!) 97.4 °F (36.3 °C)    Physical Exam:   General Appearance:  Alert, interactive, nontoxic, no acute distress.   Throat: Oropharynx moist without lesions.    Lungs:   Clear to auscultation bilaterally; no wheezes, rhonchi or rales; respirations unlabored   Heart:  RRR; no murmur, rub or gallop   Abdomen:   Soft, non-tender, non-distended, positive bowel sounds.     Extremities: No clubbing, cyanosis or edema   Skin: No new rashes or lesions. No draining wounds noted.       Labs:   All pertinent labs and imaging studies were personally reviewed  Results from last 7 days   Lab Units 24  0559 24  0329 24  0357   WBC Thousand/uL 3.95* 4.00* 3.48*   HEMOGLOBIN g/dL 7.5* 8.5* 9.3*   PLATELETS Thousands/uL 263 270 328     Results from last 7 days   Lab Units 24  0559 24  0329 24  0357 24  0357   SODIUM mmol/L 141 140  --  140   POTASSIUM mmol/L 3.7 4.0  --  3.7   CHLORIDE mmol/L 104 103  --  102   CO2 mmol/L 30 31  --  27   BUN mg/dL 16 13  --  13   CREATININE mg/dL 0.75 0.84  --  0.84   EGFR ml/min/1.73sq m 78 68  --  68   CALCIUM mg/dL 8.8 9.3  --  9.6   AST U/L 10* 12*  --   --    ALT U/L  9 10   < >  --    ALK PHOS U/L 33* 38   < >  --     < > = values in this interval not displayed.     Results from last 7 days   Lab Units 12/02/24  0329 12/01/24  0357 11/29/24  0436   PROCALCITONIN ng/ml 0.25 0.18 0.21     Results from last 7 days   Lab Units 12/03/24  0559 12/02/24  0329 12/01/24  0357 11/30/24  0619 11/29/24  0446   CRP mg/L 152.7* 175.5* 170.2* 146.1* 172.7*               Micro:  Results from last 7 days   Lab Units 12/01/24  1657 11/29/24  1612   BLOOD CULTURE  No Growth at 24 hrs.  No Growth at 24 hrs.  --    GRAM STAIN RESULT   --  1+ Polys  No bacteria seen       Imaging:  XR chest portable   Final Result by Shana Yan MD (12/01 1941)      Redemonstration of right upper lobe nodule and right upper lobe pneumonia with no significant change.            Workstation performed: [id number]         XR chest portable   Final Result by Yassine Mahoney DO (11/29 1139)      Slight worsening of right upper lobe airspace opacity, partially obscuring known right upper lobe mass.            Workstation performed: [id number]         CT pe study w abdomen pelvis w contrast   Final Result by Felipe Wood MD (11/25 0047)      1.  Bilateral pulmonary emboli   2.  The calculated ratio of right ventricular to left ventricular diameter (RV/LV ratio) is 1.4. This is greater than 0.9, which is abnormal and indicates right heart strain. An abnormal RV/LV ratio has been shown to be associated with an increased risk    of 30 day mortality in the setting of acute pulmonary embolism.   3.  Right upper lobe opacities consistent with pneumonia   4.  Mild pulmonary edema   5.  Stable right upper lobe pulmonary mass   6.  Stable right renal lesion suspicious for metastasis   7.  Decrease in size of mass in the right ischio rectal fossa      I personally discussed impression 1-3 with Fitz Burr at 11/25/24 12:44 AM               Workstation performed: [id number]         CT head without contrast    Final Result by Konstantin Alvarado MD (11/24 7561)      Reidentified vasogenic edema at the left parietal and left occipital lobe similar to the prior exam, attributed to the patient's history of brain metastases, poorly visualized on this unenhanced study. There is similar mass effect on the posterior horn    of the left lateral ventricle. No midline shift.      No acute intracranial abnormality.      Mild chronic small vessel ischemic changes.               Workstation performed: [id number]         XR chest 2 views   ED Interpretation by Fitz OLVERA DO (11/25 0211)   RUL infiltrate, as interpreted by me.       Final Result by Triston Chandra MD (11/25 1037)      Right upper lobe pneumonia.      Redemonstration of the right upper lobe mass, better evaluated on the CT from 11/24/2024.            Resident: Sanaz Corona I, the attending radiologist, have reviewed the images and agree with the final report above.      Workstation performed: [id number]                   Mynor Ceja MD  Internal Medicine          Composite Graft Text: The defect edges were debeveled with a #15 scalpel blade.  Given the location of the defect, shape of the defect, the proximity to free margins and the fact the defect was full thickness a composite graft was deemed most appropriate.  The defect was outline and then transferred to the donor site.  A full thickness graft was then excised from the donor site. The graft was then placed in the primary defect, oriented appropriately and then sutured into place.  The secondary defect was then repaired using a primary closure.

## 2024-12-03 NOTE — PROGRESS NOTES
Ayla Nye is a 74 y.o. female who is currently ordered Vancomycin IV with management by the Pharmacy Consult service.  Relevant clinical data and objective / subjective history reviewed.  Vancomycin Assessment:  Indication and Goal AUC/Trough: Pneumonia (goal -600, trough >10)  Clinical Status: stable  Micro:     Renal Function:  SCr: 0.75 mg/dL  CrCl: 52 mL/min  Renal replacement: Not on dialysis  Days of Therapy: 9  Current Dose: 1000mg q24h  Vancomycin Plan:  New Dosinmg q24h  Estimated AUC: 533 mcg*hr/mL  Estimated Trough: 12.4 mcg/mL  Next Level:  @0600  Renal Function Monitoring: Daily BMP and UOP  Pharmacy will continue to follow closely for s/sx of nephrotoxicity, infusion reactions and appropriateness of therapy.  BMP and CBC will be ordered per protocol. We will continue to follow the patient’s culture results and clinical progress daily.    Gracy Padilla, Pharmacist

## 2024-12-03 NOTE — ASSESSMENT & PLAN NOTE
Stage III NSCLC, new onset brain mets 7/30/24  Pt is on immunotherapy ketruda and carboplatin last dose 14NOV24   Oncologist Dr. Castro with [facility].    Radiologist Dr. Gamboa with [facility]    OP Pembrolizumab + PEMEtrexed + CARBOplatin Q 21 Days Day 1 Cycle 3 due 05DEC24  Last dose 14NOV24    PLAN:  Onc consulted and following  - attempted to reach out to Dr. Castro 12/2 to coordinate care. She said to put the lung cancer on the back burner and to make the pneumonia and PE the focus at this time, with input from ID. No further recs.   - attempted to reach out to Dr. Gamboa today to coordinate care, he is out of the office until 12/9

## 2024-12-03 NOTE — ASSESSMENT & PLAN NOTE
"  Recent Labs     12/01/24  0357 12/02/24  0329 12/03/24  0559   WBC 3.48* 4.00* 3.95*     Recent Labs     12/01/24  0357 12/02/24  0329   CREATININE 0.84 0.84   EGFR 68 68     Estimated Creatinine Clearance: 46.5 mL/min (by C-G formula based on SCr of 0.84 mg/dL).  No results for input(s): \"LACTICACID\" in the last 72 hours.    Recent Labs     12/01/24  0357 12/02/24  0329   PROCALCITONI 0.18 0.25       Vitals:    12/02/24 2303   BP: 141/75   Pulse: 69   Resp:    Temp:    SpO2: 95%     Temperature: 97.7 °F (36.5 °C)  Temp Source: Oral        Recent Labs     12/01/24  1657   BLOODCX No Growth at 24 hrs.  No Growth at 24 hrs.       CT pe study w abdomen pelvis w contrast  Result Date: 11/25/2024  Right upper lobe opacities consistent with pneumonia 4.  Mild pulmonary edema       DEFINITIONS   SIRS: two of the following that cannot be better explained by another cause  HR:>90/min  and WBC: <4x109/L (<4000/mm3), >12x109/L (>12,000/mm3), or =10% bands  SEPSIS  SIRS AND  Confirmed OR Suspected infection Lungs  qSOFA=0  Blood cultures negative at 72 hours  Procal continues to be negative  MRSA swab negative, vanc stopped 11/27      PLAN:  LABS:   Trend CBC and fever as markers of ongoing infection  Pneumococcal Antibody 23 ordered, to be followed up outpatient. Ordered prevnar 20 vaccine her for which was administered on 11/27.  Bronchoscopy results so far: Bronchoalveolar lavage: 2% neutrophils. 58% lymphs, 40% macrophage, total count 100, bronchial gram stain 1+ polys, no bacteria seen. AFB culture with stain negative for acid fast bacilli. Fungal cx no growth, virus cx negative at 24 hours, positive for pneumocystis PCR    REASSESS DAILY: Reassess pt response to txt daily. Document improvement or worsening status. Patient had oxygen saturation as low as 86% on room air while awake on 11/28. It went as high as 91% but did not maintain. Ordered her nasal cannula oxygen to improve this to >90%, but it was never " given. 11/29 patient saturating to low 90s on room air. Oxygen added 11/29. Currently on 4 L nasal cannula.   Given tessalon perles PRN for cough.   Repeated chest x-ray 11/29 which showed worsening right upper lobe pneumonia. Consulted pulmonology and had bronchoscopy 11/29. Pending tests: legionella culture, aspergillus antigen, Fungitell    Treatment medications  Levoquin 750mg IV q48hrs (renal dose) Given one dose [id number], stopped and switched to ceftriaxone 1,000 mg Q24Hr on 11/25. Stopped 11/29.  Vanc 750mg IV q12hrs Started 23:00 [id number]. Stopped on 11/27 due to negative MRSA cx  Duration 7-10 days (11/30 is day 7)  Current regimen per pulm recs: cefepime and vanc started 11/29  ID consulted: stopped cefepime on 12/2, plan for vancomycin through 12/5.   Given one dose today of solumedrol per pulm, now switched to prednisone 40 mg BID due to positive pneumocystis PCR, start date 12/3. Plan will be this dose for 5 days, then decreased from BID to daily, for 5 more days, then switch to 20 mg daily until the end of course.   ID also added atovaquone 750 mg BID for the Pneumocystis pneumonia    NUTRITION:   Good nutrition and tight glucose management   No current benefit of IV Vit C

## 2024-12-03 NOTE — PLAN OF CARE
Problem: Potential for Falls  Goal: Patient will remain free of falls  Description: INTERVENTIONS:  - Educate patient/family on patient safety including physical limitations  - Instruct patient to call for assistance with activity   - Consult OT/PT to assist with strengthening/mobility   - Keep Call bell within reach  - Keep bed low and locked with side rails adjusted as appropriate  - Keep care items and personal belongings within reach  - Initiate and maintain comfort rounds  - Make Fall Risk Sign visible to staff  - Apply yellow socks and bracelet for high fall risk patients  - Consider moving patient to room near nurses station  Outcome: Progressing     Problem: INFECTION - ADULT  Goal: Absence or prevention of progression during hospitalization  Description: INTERVENTIONS:  - Assess and monitor for signs and symptoms of infection  - Monitor lab/diagnostic results  - Monitor all insertion sites, i.e. indwelling lines, tubes, and drains  - Monitor endotracheal if appropriate and nasal secretions for changes in amount and color  - Liberal appropriate cooling/warming therapies per order  - Administer medications as ordered  - Instruct and encourage patient and family to use good hand hygiene technique  - Identify and instruct in appropriate isolation precautions for identified infection/condition  Outcome: Progressing     Problem: SAFETY ADULT  Goal: Patient will remain free of falls  Description: INTERVENTIONS:  - Educate patient/family on patient safety including physical limitations  - Instruct patient to call for assistance with activity   - Consult OT/PT to assist with strengthening/mobility   - Keep Call bell within reach  - Keep bed low and locked with side rails adjusted as appropriate  - Keep care items and personal belongings within reach  - Initiate and maintain comfort rounds  - Make Fall Risk Sign visible to staff  - Apply yellow socks and bracelet for high fall risk patients  - Consider moving  patient to room near nurses station  Outcome: Progressing     Problem: Knowledge Deficit  Goal: Patient/family/caregiver demonstrates understanding of disease process, treatment plan, medications, and discharge instructions  Description: Complete learning assessment and assess knowledge base.  Interventions:  - Provide teaching at level of understanding  - Provide teaching via preferred learning methods  Outcome: Progressing     Problem: RESPIRATORY - ADULT  Goal: Achieves optimal ventilation and oxygenation  Description: INTERVENTIONS:  - Assess for changes in respiratory status  - Assess for changes in mentation and behavior  - Position to facilitate oxygenation and minimize respiratory effort  - Oxygen administered by appropriate delivery if ordered  - Initiate smoking cessation education as indicated  - Encourage broncho-pulmonary hygiene including cough, deep breathe, Incentive Spirometry  - Assess the need for suctioning and aspirate as needed  - Assess and instruct to report SOB or any respiratory difficulty  - Respiratory Therapy support as indicated  Outcome: Progressing     Problem: HEMATOLOGIC - ADULT  Goal: Maintains hematologic stability  Description: INTERVENTIONS  - Assess for signs and symptoms of bleeding or hemorrhage  - Monitor labs  - Administer supportive blood products/factors as ordered and appropriate  Outcome: Progressing

## 2024-12-03 NOTE — ASSESSMENT & PLAN NOTE
Worsening right upper lobe infiltrates likely secondary to inflammatory process and less likely infectious at this time, differential includes COPD, radiation pneumonitis but cannot exclude lymphangitic spread of carcinoma though this is less likely  CXR 11/29/2024 with increased haziness of right upper lung compared to admit CXR, CT chest on admit showed right upper lobe consolidations that appear to be consistent with pneumonia  Persistent fevers on Rocephin, s/p bronchoscopy 11/29 with lymphocyte predominant BAL and cultures with no growth to date, pneumocystis PCR and Aspergillus pending  Wean oxygen for goal sats >90%, started steroids at 40 mg IV Solu-Medrol and will assess response

## 2024-12-03 NOTE — ASSESSMENT & PLAN NOTE
Patient reached sepsis criteria AM of 11/26 due to fevers, elevated wbc count, and HR >90. LA ordered and normal. Procal continues to be normal. Will continue to monitor with antibiotics for penumonia, and heparin drip for PE. Will continue to get daily labs.   AM of 11/29: patient no longer septic due to no fevers in the past 24 hours, but still having mild temperature elevations and Hrs in the 90s. Will monitor.   12/3: no fevers in the past 2 days, Hrs 80s-90s, continues to need oxygen via nasal cannula, 4 L.

## 2024-12-03 NOTE — ASSESSMENT & PLAN NOTE
PERC 2  WELLS 2.5  PESI 104 Class III Intermediate risk   [id number]: CT pe study w abdomen pelvis w contrast  PULMONARY ARTERIAL TREE: Embolism straddles between the middle lobe and right lower lobe basal segment arteries, as seen on series 301/112, this is new from the most recent prior.   Additional embolism noted within the left lower lobe basal arteries straddling into the lower lingular segment, as noted on series 301/, also new from prior.  RV diameter at the level of the AV valve = 3. 9 cm  LV diameter at the level of the AV valve = 2.7 cm  Ratio equals 1.4  CLASSIFICATION Acute hemodynamically stable bilateral right sided segmental left basilar associated with SOB most likely provoked from cancer hx while on immunotherapy   ECHO : EF 65%, diastolic dysfunction grade I. Mitral valve has mild annular calcification and tricuspid valve has mild regurgitation.     PLAN  Xarelto started , heparin drip stopped. Price check performed and acceptable for patient. Plan will be this dose for 21 days, then 20 mg daily for 6 months, then 10 mg indefinitely.

## 2024-12-04 LAB
ALBUMIN SERPL BCG-MCNC: 3.5 G/DL (ref 3.5–5)
ALP SERPL-CCNC: 41 U/L (ref 34–104)
ALT SERPL W P-5'-P-CCNC: 12 U/L (ref 7–52)
ANION GAP SERPL CALCULATED.3IONS-SCNC: 13 MMOL/L (ref 4–13)
ANISOCYTOSIS BLD QL SMEAR: PRESENT
AST SERPL W P-5'-P-CCNC: 13 U/L (ref 13–39)
BASOPHILS # BLD MANUAL: 0 THOUSAND/UL (ref 0–0.1)
BASOPHILS NFR MAR MANUAL: 0 % (ref 0–1)
BILIRUB SERPL-MCNC: 0.26 MG/DL (ref 0.2–1)
BUN SERPL-MCNC: 18 MG/DL (ref 5–25)
CALCIUM SERPL-MCNC: 9.7 MG/DL (ref 8.4–10.2)
CHLORIDE SERPL-SCNC: 101 MMOL/L (ref 96–108)
CO2 SERPL-SCNC: 28 MMOL/L (ref 21–32)
CREAT SERPL-MCNC: 0.82 MG/DL (ref 0.6–1.3)
CRP SERPL QL: 158.1 MG/L
DACRYOCYTES BLD QL SMEAR: PRESENT
EOSINOPHIL # BLD MANUAL: 0 THOUSAND/UL (ref 0–0.4)
EOSINOPHIL NFR BLD MANUAL: 0 % (ref 0–6)
ERYTHROCYTE [DISTWIDTH] IN BLOOD BY AUTOMATED COUNT: 14.9 % (ref 11.6–15.1)
GFR SERPL CREATININE-BSD FRML MDRD: 70 ML/MIN/1.73SQ M
GLUCOSE SERPL-MCNC: 113 MG/DL (ref 65–140)
HCT VFR BLD AUTO: 28.7 % (ref 34.8–46.1)
HGB BLD-MCNC: 8.9 G/DL (ref 11.5–15.4)
LYMPHOCYTES # BLD AUTO: 0.06 THOUSAND/UL (ref 0.6–4.47)
LYMPHOCYTES # BLD AUTO: 1 % (ref 14–44)
MAGNESIUM SERPL-MCNC: 1.9 MG/DL (ref 1.9–2.7)
MCH RBC QN AUTO: 30.8 PG (ref 26.8–34.3)
MCHC RBC AUTO-ENTMCNC: 31 G/DL (ref 31.4–37.4)
MCV RBC AUTO: 99 FL (ref 82–98)
METAMYELOCYTE ABSOLUTE CT: 0.13 THOUSAND/UL (ref 0–0.1)
METAMYELOCYTES NFR BLD MANUAL: 2 % (ref 0–1)
MONOCYTES # BLD AUTO: 0.19 THOUSAND/UL (ref 0–1.22)
MONOCYTES NFR BLD: 3 % (ref 4–12)
MYELOCYTE ABSOLUTE CT: 0.13 THOUSAND/UL (ref 0–0.1)
MYELOCYTES NFR BLD MANUAL: 2 % (ref 0–1)
NEUTROPHILS # BLD MANUAL: 5.97 THOUSAND/UL (ref 1.85–7.62)
NEUTS BAND NFR BLD MANUAL: 2 % (ref 0–8)
NEUTS SEG NFR BLD AUTO: 90 % (ref 43–75)
PLATELET # BLD AUTO: 400 THOUSANDS/UL (ref 149–390)
PLATELET BLD QL SMEAR: ADEQUATE
PMV BLD AUTO: 9 FL (ref 8.9–12.7)
POLYCHROMASIA BLD QL SMEAR: PRESENT
POTASSIUM SERPL-SCNC: 4.3 MMOL/L (ref 3.5–5.3)
PROT SERPL-MCNC: 7.1 G/DL (ref 6.4–8.4)
RBC # BLD AUTO: 2.89 MILLION/UL (ref 3.81–5.12)
RBC MORPH BLD: PRESENT
SODIUM SERPL-SCNC: 142 MMOL/L (ref 135–147)
WBC # BLD AUTO: 6.49 THOUSAND/UL (ref 4.31–10.16)

## 2024-12-04 PROCEDURE — 99233 SBSQ HOSP IP/OBS HIGH 50: CPT | Performed by: INTERNAL MEDICINE

## 2024-12-04 PROCEDURE — 85027 COMPLETE CBC AUTOMATED: CPT

## 2024-12-04 PROCEDURE — 85007 BL SMEAR W/DIFF WBC COUNT: CPT

## 2024-12-04 PROCEDURE — 99232 SBSQ HOSP IP/OBS MODERATE 35: CPT | Performed by: INTERNAL MEDICINE

## 2024-12-04 PROCEDURE — 83735 ASSAY OF MAGNESIUM: CPT

## 2024-12-04 PROCEDURE — 86140 C-REACTIVE PROTEIN: CPT

## 2024-12-04 PROCEDURE — 80053 COMPREHEN METABOLIC PANEL: CPT

## 2024-12-04 PROCEDURE — 99232 SBSQ HOSP IP/OBS MODERATE 35: CPT | Performed by: FAMILY MEDICINE

## 2024-12-04 RX ADMIN — LEVETIRACETAM 1000 MG: 250 TABLET, FILM COATED ORAL at 23:01

## 2024-12-04 RX ADMIN — PREDNISONE 40 MG: 20 TABLET ORAL at 16:13

## 2024-12-04 RX ADMIN — Medication 1 PACKET: at 09:09

## 2024-12-04 RX ADMIN — GABAPENTIN 300 MG: 300 CAPSULE ORAL at 23:01

## 2024-12-04 RX ADMIN — SENNOSIDES 8.6 MG: 8.6 TABLET, FILM COATED ORAL at 23:02

## 2024-12-04 RX ADMIN — GABAPENTIN 100 MG: 100 CAPSULE ORAL at 10:44

## 2024-12-04 RX ADMIN — LEVETIRACETAM 1000 MG: 250 TABLET, FILM COATED ORAL at 09:09

## 2024-12-04 RX ADMIN — FOLIC ACID 1 MG: 1 TABLET ORAL at 09:09

## 2024-12-04 RX ADMIN — ATOVAQUONE 750 MG: 750 SUSPENSION ORAL at 16:13

## 2024-12-04 RX ADMIN — ATOVAQUONE 750 MG: 750 SUSPENSION ORAL at 10:45

## 2024-12-04 RX ADMIN — GABAPENTIN 100 MG: 100 CAPSULE ORAL at 06:07

## 2024-12-04 RX ADMIN — LEVOTHYROXINE SODIUM 50 MCG: 0.05 TABLET ORAL at 05:07

## 2024-12-04 RX ADMIN — PANTOPRAZOLE SODIUM 40 MG: 40 TABLET, DELAYED RELEASE ORAL at 05:07

## 2024-12-04 RX ADMIN — VANCOMYCIN HYDROCHLORIDE 1250 MG: 5 INJECTION, POWDER, LYOPHILIZED, FOR SOLUTION INTRAVENOUS at 09:09

## 2024-12-04 RX ADMIN — RIVAROXABAN 15 MG: 15 TABLET, FILM COATED ORAL at 16:13

## 2024-12-04 RX ADMIN — RIVAROXABAN 15 MG: 15 TABLET, FILM COATED ORAL at 09:09

## 2024-12-04 RX ADMIN — CYANOCOBALAMIN TAB 500 MCG 1000 MCG: 500 TAB at 09:09

## 2024-12-04 RX ADMIN — PREDNISONE 40 MG: 20 TABLET ORAL at 09:09

## 2024-12-04 NOTE — ASSESSMENT & PLAN NOTE
Blood Pressure: 152/85  Recent Labs     12/03/24  0559 12/03/24  1824 12/04/24  0514   HGB 7.5* 8.5* 8.9*   BL 8-11    Plan:  -monitor, on Xarelto.   - on AM of 11/29, patient had hemoglobin of 6.6. no coughing up blood, or blood in stool or urine. Only slight bleeding with blowing her nose that she notes. Prepared and gave 1 unit of leukoreduced PRBCs. Repeat H&H afterwards was 9.2.  Transfuse if Hb < 7

## 2024-12-04 NOTE — ASSESSMENT & PLAN NOTE
"  Recent Labs     12/02/24  0329 12/03/24  0559 12/04/24  0514   WBC 4.00* 3.95* 6.49     Recent Labs     12/02/24  0329 12/03/24  0559 12/04/24  0514   CREATININE 0.84 0.75 0.82   EGFR 68 78 70     Estimated Creatinine Clearance: 47.6 mL/min (by C-G formula based on SCr of 0.82 mg/dL).  No results for input(s): \"LACTICACID\" in the last 72 hours.    Recent Labs     12/02/24  0329   PROCALCITONI 0.25       Vitals:    12/03/24 2302   BP: 152/85   Pulse: 66   Resp:    Temp: (!) 96.4 °F (35.8 °C)   SpO2: 96%     Temperature: (!) 96.4 °F (35.8 °C)  Temp Source: Oral        Recent Labs     12/01/24  1657   BLOODCX No Growth at 48 hrs.  No Growth at 48 hrs.       CT pe study w abdomen pelvis w contrast  Result Date: 11/25/2024  Right upper lobe opacities consistent with pneumonia 4.  Mild pulmonary edema       DEFINITIONS   SIRS: two of the following that cannot be better explained by another cause  HR:>90/min  and WBC: <4x109/L (<4000/mm3), >12x109/L (>12,000/mm3), or =10% bands  SEPSIS  SIRS AND  Confirmed OR Suspected infection Lungs  qSOFA=0  Blood cultures negative at 72 hours  Procal continues to be negative  MRSA swab negative, vanc stopped 11/27      PLAN:  LABS:   Trend CBC and fever as markers of ongoing infection  Pneumococcal Antibody 23 ordered, to be followed up outpatient. Ordered prevnar 20 vaccine her for which was administered on 11/27.  Bronchoscopy results so far: Bronchoalveolar lavage: 2% neutrophils. 58% lymphs, 40% macrophage, total count 100, bronchial gram stain 1+ polys, no bacteria seen. AFB culture with stain negative for acid fast bacilli. Fungal cx no growth, virus cx negative at 24 hours, positive for pneumocystis PCR    REASSESS DAILY: Reassess pt response to txt daily. Document improvement or worsening status. Patient had oxygen needs starting on 11/28. Currently on 2 L nasal cannula, an improvement from needing 4 L the past couple days  Given tessalon perles PRN for cough. "   Repeated chest x-ray 11/29 which showed worsening right upper lobe pneumonia. Consulted pulmonology and had bronchoscopy 11/29. Pending tests: legionella culture, aspergillus antigen, Fungitell  PT/OT ordered for discharge recommendations    Treatment medications  Levoquin 750mg IV q48hrs (renal dose) Given one dose 24NOV24, stopped and switched to ceftriaxone 1,000 mg Q24Hr on 11/25. Stopped 11/29.  Vanc 750mg IV q12hrs Started 23:00 24NOV24. Stopped on 11/27 due to negative MRSA cx, restarted due to lack of improvement.   Current regimen per pulm and ID: Finish last dose of Vanc tomorrow, 12/5.  Atovaquone 750 mg BID for 21 days started 12/3, then switch to Bactrim 1 double strength tablet MFW as prophylaxis with steroid use. Prednisone 40 mg BID start date 12/3. Plan will be this dose for 5 days, then decreased from BID to daily, for 5 more days, then switch to 20 mg daily until outpatient follow up with oncology. Pulm follow up outpatient as well after discharge.   Patient can potentially leave tomorrow after vanc if comfortable    NUTRITION:   Good nutrition and tight glucose management   No current benefit of IV Vit C

## 2024-12-04 NOTE — PROGRESS NOTES
Ayla Nye is a 74 y.o. female who is currently ordered Vancomycin IV with management by the Pharmacy Consult service.  Relevant clinical data and objective / subjective history reviewed.  Vancomycin Assessment:  Indication and Goal AUC/Trough: Pneumonia (goal -600, trough >10)  Clinical Status: stable  Micro:     Renal Function:  SCr: 0.82 mg/dL  CrCl: 41.5 mL/min  Renal replacement: Not on dialysis  Days of Therapy: 10  Current Dose: 1250 mg IV every 24 hours  Vancomycin Plan:  New Dosing: Continue current dosing  Estimated AUC: 581 mcg*hr/mL  Estimated Trough: 14.1 mcg/mL  Next Level: 12/8 at 0600  Renal Function Monitoring: Daily BMP and UOP  Pharmacy will continue to follow closely for s/sx of nephrotoxicity, infusion reactions and appropriateness of therapy.  BMP and CBC will be ordered per protocol. We will continue to follow the patient’s culture results and clinical progress daily.    Rebeca Lopes, Pharmacist

## 2024-12-04 NOTE — ASSESSMENT & PLAN NOTE
Patient reached sepsis criteria AM of 11/26 due to fevers, elevated wbc count, and HR >90. LA ordered and normal. Procal continues to be normal. Will continue to monitor with antibiotics for penumonia, and heparin drip for PE. Will continue to get daily labs.   AM of 11/29: patient no longer septic due to no fevers in the past 24 hours, but still having mild temperature elevations and Hrs in the 90s. Will monitor.   12/3: no fevers in the past 2 days, Hrs 80s-90s, continues to need oxygen via nasal cannula, 2 L.

## 2024-12-04 NOTE — CASE MANAGEMENT
Case Management Progress Note    Patient name Ayla Nye  Location S /S -01 MRN [id number]  : 1950 Date 2024       LOS (days): 9  Geometric Mean LOS (GMLOS) (days): 4.9  Days to GMLOS:-4.6        OBJECTIVE:        Current admission status: Inpatient  Preferred Pharmacy:   [id number] [place], [place] - [address] [facility]  [address]  [facility] [place] [address]  Phone: [phone] Fax: [phone]    Primary Care Provider: Rafy Angelo MD    Primary Insurance: MEDICARE  Secondary Insurance: AARP    PROGRESS NOTE:    Weekly Care Management Length of Stay Review     Current LOS: 9 Days    Most Recent Labs:     Lab Results   Component Value Date/Time    WBC 6.49 2024 05:14 AM    HGB 8.9 (L) 2024 05:14 AM    HCT 28.7 (L) 2024 05:14 AM     (H) 2024 05:14 AM    BANDSPCT 2 2024 05:14 AM    SODIUM 142 2024 05:14 AM    K 4.3 2024 05:14 AM     2024 05:14 AM    CO2 28 2024 05:14 AM    BUN 18 2024 05:14 AM    CREATININE 0.82 2024 05:14 AM    GLUC 113 2024 05:14 AM    ALKPHOS 41 2024 05:14 AM    ALT 12 2024 05:14 AM    AST 13 2024 05:14 AM    ALB 3.5 2024 05:14 AM    TBILI 0.26 2024 05:14 AM       Most Recent Vitals:   Vitals:    24 0740   BP: 134/79   Pulse: 86   Resp: 18   Temp: 98.3 °F (36.8 °C)   SpO2: 96%        Identified Barriers to Discharge/Discharge Goals/Care Management Interventions: acute pulmonary embolism, hem/onc consulted, ID consulted, O2 needs increased, recheck H&H labs.     Intended Discharge Disposition: home, xarelto price check completed    Expected Discharge Date:

## 2024-12-04 NOTE — PROGRESS NOTES
Progress Note - Hospitalist   Name: Ayla Nye 74 y.o. female I MRN: [id number]  Unit/Bed#: S -01 I Date of Admission: 2024   Date of Service: 2024 I Hospital Day: 9    Assessment & Plan  Malignant neoplasm of upper lobe, right bronchus or lung (HCC)  Stage III NSCLC, new onset brain mets 24  Pt is on immunotherapy ketruda and carboplatin last dose    Oncologist Dr. Castro with [facility].    Radiologist Dr. Gamboa with [facility]    OP Pembrolizumab + PEMEtrexed + CARBOplatin Q 21 Days Day 1 Cycle 3 due [id number]  Last dose     PLAN:  Onc consulted and following  - attempted to reach out to Dr. Castro  to coordinate care. She said to put the lung cancer on the back burner and to make the pneumonia and PE the focus at this time, with input from ID. No further recs. Reached out again to confirm medication regimen on .   - attempted to reach out to Dr. Gamboa to coordinate care, he is out of the office until   Malignant neoplasm metastatic to brain (HCC)  Currently being treated with dexamethazone and keppra outpatient    PLAN  Continue home keppra  Dexamethasone switched to prednisone for the time being in the setting of pneumocystic pneumonia  Seizure precautions  Fall precautions  Ativan prn for seizure activity   Anemia    Blood Pressure: 152/85  Recent Labs     24  0559 24  1824 24  0514   HGB 7.5* 8.5* 8.9*   BL 8-11    Plan:  -monitor, on Xarelto.   - on AM of , patient had hemoglobin of 6.6. no coughing up blood, or blood in stool or urine. Only slight bleeding with blowing her nose that she notes. Prepared and gave 1 unit of leukoreduced PRBCs. Repeat H&H afterwards was 9.2.  Transfuse if Hb < 7      Acute pulmonary embolism (HCC)  PERC 2  WELLS 2.5  PESI 104 Class III Intermediate risk   Imagin: CT pe study w abdomen pelvis w contrast  PULMONARY ARTERIAL TREE: Embolism straddles between the middle lobe and right lower lobe basal  "segment arteries, as seen on series 301/112, this is new from the most recent prior.   Additional embolism noted within the left lower lobe basal arteries straddling into the lower lingular segment, as noted on series 301/98, also new from prior.  RV diameter at the level of the AV valve = 3. 9 cm  LV diameter at the level of the AV valve = 2.7 cm  Ratio equals 1.4  CLASSIFICATION Acute hemodynamically stable bilateral right sided segmental left basilar associated with SOB most likely provoked from cancer hx while on immunotherapy   ECHO 11/25: EF 65%, diastolic dysfunction grade I. Mitral valve has mild annular calcification and tricuspid valve has mild regurgitation.     PLAN  Xarelto started 11/28, heparin drip stopped. Price check performed and acceptable for patient. Plan will be this dose for 21 days, then 20 mg daily for 6 months, then 10 mg indefinitely.     Pneumonia    Recent Labs     12/02/24  0329 12/03/24  0559 12/04/24  0514   WBC 4.00* 3.95* 6.49     Recent Labs     12/02/24  0329 12/03/24  0559 12/04/24  0514   CREATININE 0.84 0.75 0.82   EGFR 68 78 70     Estimated Creatinine Clearance: 47.6 mL/min (by C-G formula based on SCr of 0.82 mg/dL).  No results for input(s): \"LACTICACID\" in the last 72 hours.    Recent Labs     12/02/24  0329   PROCALCITONI 0.25       Vitals:    12/03/24 2302   BP: 152/85   Pulse: 66   Resp:    Temp: (!) 96.4 °F (35.8 °C)   SpO2: 96%     Temperature: (!) 96.4 °F (35.8 °C)  Temp Source: Oral        Recent Labs     12/01/24  1657   BLOODCX No Growth at 48 hrs.  No Growth at 48 hrs.       CT pe study w abdomen pelvis w contrast  Result Date: 11/25/2024  Right upper lobe opacities consistent with pneumonia 4.  Mild pulmonary edema       DEFINITIONS   SIRS: two of the following that cannot be better explained by another cause  HR:>90/min  and WBC: <4x109/L (<4000/mm3), >12x109/L (>12,000/mm3), or =10% bands  SEPSIS  SIRS AND  Confirmed OR Suspected infection " Lungs  qSOFA=0  Blood cultures negative at 72 hours  Procal continues to be negative  MRSA swab negative, vanc stopped 11/27      PLAN:  LABS:   Trend CBC and fever as markers of ongoing infection  Pneumococcal Antibody 23 ordered, to be followed up outpatient. Ordered prevnar 20 vaccine her for which was administered on 11/27.  Bronchoscopy results so far: Bronchoalveolar lavage: 2% neutrophils. 58% lymphs, 40% macrophage, total count 100, bronchial gram stain 1+ polys, no bacteria seen. AFB culture with stain negative for acid fast bacilli. Fungal cx no growth, virus cx negative at 24 hours, positive for pneumocystis PCR    REASSESS DAILY: Reassess pt response to txt daily. Document improvement or worsening status. Patient had oxygen needs starting on 11/28. Currently on 2 L nasal cannula, an improvement from needing 4 L the past couple days  Given tessalon perles PRN for cough.   Repeated chest x-ray 11/29 which showed worsening right upper lobe pneumonia. Consulted pulmonology and had bronchoscopy 11/29. Pending tests: legionella culture, aspergillus antigen, Fungitell  PT/OT ordered for discharge recommendations    Treatment medications  Levoquin 750mg IV q48hrs (renal dose) Given one dose 24NOV24, stopped and switched to ceftriaxone 1,000 mg Q24Hr on 11/25. Stopped 11/29.  Vanc 750mg IV q12hrs Started 23:00 24NOV24. Stopped on 11/27 due to negative MRSA cx, restarted due to lack of improvement.   Current regimen per pulm and ID: Finish last dose of Vanc tomorrow, 12/5.  Atovaquone 750 mg BID for 21 days started 12/3, then switch to Bactrim 1 double strength tablet MFW as prophylaxis with steroid use. Prednisone 40 mg BID start date 12/3. Plan will be this dose for 5 days, then decreased from BID to daily, for 5 more days, then switch to 20 mg daily until outpatient follow up with oncology. Pulm follow up outpatient as well after discharge.   Patient can potentially leave tomorrow after vanc if  comfortable    NUTRITION:   Good nutrition and tight glucose management   No current benefit of IV Vit C  Neutropenia (HCC)      Component      Latest Ref Rng 2/16/2024 3/12/2024 4/5/2024 4/30/2024 5/20/2024   Absolute Neutrophils      1.85 - 7.62 Thousand/uL 6.06  5.47  4.95  5.71  5.87      Component      Latest Ref Rng 5/28/2024 6/3/2024 6/10/2024 6/18/2024 6/22/2024   Absolute Neutrophils      1.85 - 7.62 Thousand/uL 5.89  6.31  5.21  3.42  3.63      Component      Latest Ref Rng 6/23/2024 6/24/2024 7/1/2024 7/2/2024 7/3/2024 7/15/2024   Absolute Neutrophils      1.85 - 7.62 Thousand/uL 2.38  1.58 (L)  1.81 (L)  2.08  1.83 (L)  2.08      Component      Latest Ref Rng 7/29/2024 8/21/2024 8/22/2024 8/23/2024 8/24/2024   Absolute Neutrophils      1.85 - 7.62 Thousand/uL 2.95  6.57  5.54  6.22  4.46      Component      Latest Ref Rng 9/9/2024 9/10/2024 9/11/2024 9/12/2024 9/13/2024   Absolute Neutrophils      1.85 - 7.62 Thousand/uL 5.00  3.22  5.80  4.26  7.84 (H)      Component      Latest Ref Rng 10/3/2024 10/16/2024 10/21/2024 10/22/2024 10/27/2024   Absolute Neutrophils      1.85 - 7.62 Thousand/uL 7.17  1.58 (L)  2.79  4.39  9.78 (H)      Component      Latest Ref Rng 11/11/2024 11/24/2024   Absolute Neutrophils      1.85 - 7.62 Thousand/uL 9.40 (H)  1.54 (L)       Recent Labs     12/02/24  0329 12/03/24  0559 12/04/24  0514   AST 12* 10* 13   ALT 10 9 12   ALKPHOS 38 33* 41   TBILI 0.32 0.25 0.26         Fever at home 101.3  Urine culture 11/24: < 10,000 cfu/mL mixed contaminants x2  PLAN:  Antibiotics Vanc and Atovaquone  -Oncology consulted  - Pulm consulted: trial of solumedrol 40 mg daily x1, now prednisone  -ID consulted: continue vanc through 12/5  Neutropenic precautions  Sepsis, unspecified organism (HCC)  Patient reached sepsis criteria AM of 11/26 due to fevers, elevated wbc count, and HR >90. LA ordered and normal. Procal continues to be normal. Will continue to monitor with antibiotics for  penumonia, and heparin drip for PE. Will continue to get daily labs.   AM of 11/29: patient no longer septic due to no fevers in the past 24 hours, but still having mild temperature elevations and Hrs in the 90s. Will monitor.   12/3: no fevers in the past 2 days, Hrs 80s-90s, continues to need oxygen via nasal cannula, 2 L.  Acute hypoxic respiratory failure (HCC)  Patient requiring oxygen 2 L via nasal cannula currently. Oxygen needs likely due to the setting of pneumonia and pulmonary embolism. Also had recent bronchoscopy which can increase oxygen needs initially afterwards.   -patient will likely need oxygen on discharge for a few weeks as her lungs recover. Will order home O2 eval tomorrow, 12/5 and set this up with case management.     VTE Pharmacologic Prophylaxis: VTE Score: 9 High Risk (Score >/= 5) - Pharmacological DVT Prophylaxis Ordered: rivaroxaban (Xarelto). Sequential Compression Devices Ordered.    Mobility:   Basic Mobility Inpatient Raw Score: 24  JH-HLM Goal: 8: Walk 250 feet or more  JH-HLM Achieved: 8: Walk 250 feet ot more  JH-HLM Goal achieved. Continue to encourage appropriate mobility.    Patient Centered Rounds: I performed bedside rounds with nursing staff today.   Discussions with Specialists or Other Care Team Provider: ID, Pulm, attending, case management, nursing    Education and Discussions with Family / Patient: Attempted to update  (daughter) via phone. Left voicemail.     Current Length of Stay: 9 day(s)  Current Patient Status: Inpatient   Certification Statement: The patient will continue to require additional inpatient hospital stay due to pneumocystic pneumonia  Discharge Plan: Anticipate discharge in 48-72 hrs to discharge location to be determined pending rehab evaluations.    Code Status: Level 1 - Full Code    Subjective   Patient seen at bedside this morning. I informed her of her diagnosis of pneumocystic pneumonia which is common in immunocompromised  patients. She is aware that she now has a new liquid antibiotic named atovaquone to cover this, and that she is now on prednisone over decadron and solumedrol. She is starting to feel better and her oxygen needs have went down today.     Objective :  Temp:  [96.4 °F (35.8 °C)-97.8 °F (36.6 °C)] 96.4 °F (35.8 °C)  HR:  [66-69] 66  BP: (142-152)/(78-90) 152/85  Resp:  [18] 18  SpO2:  [95 %-96 %] 96 %  O2 Device: Nasal cannula  Nasal Cannula O2 Flow Rate (L/min):  [3 L/min] 3 L/min    Body mass index is 20.85 kg/m².     Input and Output Summary (last 24 hours):     Intake/Output Summary (Last 24 hours) at 12/4/2024 0633  Last data filed at 12/3/2024 1250  Gross per 24 hour   Intake 360 ml   Output --   Net 360 ml       Physical Exam  Constitutional:       General: She is not in acute distress.     Appearance: Normal appearance. She is not ill-appearing.   HENT:      Head: Normocephalic and atraumatic.      Right Ear: External ear normal.      Left Ear: External ear normal.      Nose: Nose normal.      Comments: Nasal cannula in place     Mouth/Throat:      Mouth: Mucous membranes are moist.      Pharynx: Oropharynx is clear.   Eyes:      Conjunctiva/sclera: Conjunctivae normal.   Cardiovascular:      Rate and Rhythm: Normal rate and regular rhythm.      Pulses: Normal pulses.      Heart sounds: Normal heart sounds.   Pulmonary:      Effort: Pulmonary effort is normal.      Breath sounds: Normal breath sounds.      Comments: Patient on 2 L via nasal cannula saturating at 94%  Abdominal:      General: Abdomen is flat. Bowel sounds are normal. There is no distension.      Palpations: Abdomen is soft.      Tenderness: There is no abdominal tenderness.   Musculoskeletal:         General: No swelling.   Skin:     General: Skin is warm and dry.      Capillary Refill: Capillary refill takes less than 2 seconds.   Neurological:      General: No focal deficit present.      Mental Status: She is alert and oriented to person,  place, and time.   Psychiatric:         Mood and Affect: Mood normal.         Behavior: Behavior normal.         Lab Results: I have reviewed the following results:   Results from last 7 days   Lab Units 12/04/24  0514 11/29/24  0446 11/28/24  0501   WBC Thousand/uL 6.49   < > 3.20*   HEMOGLOBIN g/dL 8.9*   < > 7.3*   HEMATOCRIT % 28.7*   < > 22.6*   PLATELETS Thousands/uL 400*   < > 190   BANDS PCT % 2   < >  --    SEGS PCT %  --   --  76*   LYMPHO PCT % 1*   < > 5*   MONO PCT % 3*   < > 8   EOS PCT % 0   < > 2    < > = values in this interval not displayed.     Results from last 7 days   Lab Units 12/04/24  0514   SODIUM mmol/L 142   POTASSIUM mmol/L 4.3   CHLORIDE mmol/L 101   CO2 mmol/L 28   BUN mg/dL 18   CREATININE mg/dL 0.82   ANION GAP mmol/L 13   CALCIUM mg/dL 9.7   ALBUMIN g/dL 3.5   TOTAL BILIRUBIN mg/dL 0.26   ALK PHOS U/L 41   ALT U/L 12   AST U/L 13   GLUCOSE RANDOM mg/dL 113                 Results from last 7 days   Lab Units 12/02/24  0329 12/01/24  0357 11/29/24  0436   PROCALCITONIN ng/ml 0.25 0.18 0.21       Recent Cultures (last 7 days):   Results from last 7 days   Lab Units 12/01/24  1657 11/29/24  1612   BLOOD CULTURE  No Growth at 48 hrs.  No Growth at 48 hrs.  --    GRAM STAIN RESULT   --  1+ Polys  No bacteria seen       Imaging Results Review: No pertinent imaging studies reviewed.  Other Study Results Review: No additional pertinent studies reviewed.    Last 24 Hours Medication List:     Current Facility-Administered Medications:     aluminum-magnesium hydroxide-simethicone (MAALOX) oral suspension 30 mL, Q6H PRN    atovaquone (MEPRON) oral suspension 750 mg, BID With Meals    benzonatate (TESSALON PERLES) capsule 200 mg, TID PRN    cyanocobalamin (VITAMIN B-12) tablet 1,000 mcg, Daily    folic acid (FOLVITE) tablet 1 mg, Daily    gabapentin (NEURONTIN) capsule 100 mg, BID before breakfast/lunch    gabapentin (NEURONTIN) capsule 300 mg, HS    ibuprofen (MOTRIN) tablet 400 mg, Q6H PRN     lactobacillus acidophilus-bulgaricus (FLORANEX) packet 1 packet, Daily    levETIRAcetam (KEPPRA) tablet 1,000 mg, Q12H KRISS    levothyroxine tablet 50 mcg, Early Morning    LORazepam (ATIVAN) injection 1 mg, Q8H PRN    LORazepam (ATIVAN) injection 2 mg, Q8H PRN    ondansetron (ZOFRAN) injection 4 mg, Q6H PRN    pantoprazole (PROTONIX) EC tablet 40 mg, Early Morning    predniSONE tablet 40 mg, BID With Meals    rivaroxaban (XARELTO) tablet 15 mg, BID With Meals    senna (SENOKOT) tablet 8.6 mg, HS    vancomycin (VANCOCIN) 1,250 mg in sodium chloride 0.9 % 250 mL IVPB, Q24H, Last Rate: 1,250 mg (12/03/24 0819)    Facility-Administered Medications Ordered in Other Encounters:     fentaNYL injection, PRN    midazolam (VERSED) injection, PRN    Administrative Statements   Today, Patient Was Seen By: Fabby Monzon, DO  I have spent a total time of 60 minutes in caring for this patient on the day of the visit/encounter including Diagnostic results, Prognosis, Risks and benefits of tx options, Instructions for management, Patient and family education, Importance of tx compliance, Risk factor reductions, Impressions, Counseling / Coordination of care, Documenting in the medical record, Reviewing / ordering tests, medicine, procedures  , Obtaining or reviewing history  , and Communicating with other healthcare professionals .    **Please Note: This note may have been constructed using a voice recognition system.**

## 2024-12-04 NOTE — PROGRESS NOTES
Progress Note - Pulmonology   Name: Ayla Nye 74 y.o. female I MRN: [id number]  Unit/Bed#: S -01 I Date of Admission: 11/24/2024   Date of Service: 12/4/2024 I Hospital Day: 9     Assessment & Plan  Acute hypoxic respiratory failure (HCC)  Worsening right upper lobe infiltrates could represent atypical PCP presentation, radiation pneumonitis or Keytruda toxicity   CXR 11/29/2024 with increased haziness of right upper lung compared to admit CXR, CT chest on admit showed right upper lobe consolidations that appear to be consistent with pneumonia  Persistent fevers on Rocephin, s/p bronchoscopy 11/29 with lymphocyte predominant BAL and cultures with no growth to date, pneumocystis PCR positive   Wean oxygen for goal sats >90%  Will treat for PCP with Atovaquone for 21 days and Prednisone 40 mg BID for 5 days then 40 mg for 5 days then 20 mg until follow up, will need Bactrim MWF for PCP ppx once off Atovaquone  Pneumonia  Noted initially on admit CXR and CT chest, MRSA swab negative  Has been on Rocephin with continued fevers and CXR on 11/29 with worsening RUL infiltrates  Bronchoscopy 11/29 with cultures showing no growth  CRP 22.7 on 11/11, CRP trend since 11/29 172--146--170--175  Fevers resolved after escalating to Vanco/cefepime but BAL showed lymphocyte predominant fluid and no organisms identified on Gram stain    Acute pulmonary embolism (HCC)  Likely provoked in setting of known adeno-NSCLC with brain metastases  Continue Xarelto, will likely need lifelong given metastatic cancer  Malignant neoplasm of upper lobe, right bronchus or lung (HCC)  Follows with Dr. Penn, currently on cycle 2 of pemetrexed/carboplatin/pembrolizumab with last infusion 11/14/2024  Neutropenia (HCC)  Insetting of chemotherapy, may be masking typical immune response explaining negative procalcitonin and low WBC    24 Hour Events : feeling much better today   Subjective : She is feeling much better today. She is down  to 1 L NC in the room today. She denies any recurrence of fevers. She is still having a persistent dry cough. Dicussed rationale for treatment of PCP with patient and family at bedside. Also discussed possibility that this is still an inflammatory and not infectious process but steroids will help with this as well. Discussed plan with ID attending regarding treating PCP with atovaquone and steroids.     Objective :  Temp:  [96.4 °F (35.8 °C)-98.3 °F (36.8 °C)] 98.3 °F (36.8 °C)  HR:  [66-86] 86  BP: (134-152)/(78-85) 134/79  Resp:  [18] 18  SpO2:  [96 %] 96 %  O2 Device: Nasal cannula  Nasal Cannula O2 Flow Rate (L/min):  [3 L/min] 3 L/min    Physical Exam  Vitals reviewed.   Constitutional:       General: She is not in acute distress.     Appearance: She is not toxic-appearing.   Cardiovascular:      Rate and Rhythm: Normal rate and regular rhythm.   Pulmonary:      Effort: Pulmonary effort is normal. No respiratory distress.      Breath sounds: No wheezing or rales.   Neurological:      General: No focal deficit present.      Mental Status: She is alert and oriented to person, place, and time.           Lab Results: I have reviewed the following results:   .     12/04/24  0514   WBC 6.49   HGB 8.9*   HCT 28.7*   *   BANDSPCT 2   SODIUM 142   K 4.3      CO2 28   BUN 18   CREATININE 0.82   GLUC 113   MG 1.9   AST 13   ALT 12   ALB 3.5   TBILI 0.26   ALKPHOS 41     ABG: No new results in last 24 hours.    Other Study Results Review: Other studies reviewed include: PCP PCR on BAL    Eliseo Small M.D.  Pulmonary & Critical Care Fellow, PGY-IV

## 2024-12-04 NOTE — ASSESSMENT & PLAN NOTE
Stage III NSCLC, new onset brain mets 7/30/24  Pt is on immunotherapy ketruda and carboplatin last dose 14NOV24   Oncologist Dr. Castro with [facility].    Radiologist Dr. Gamboa with [facility]    OP Pembrolizumab + PEMEtrexed + CARBOplatin Q 21 Days Day 1 Cycle 3 due 05DEC24  Last dose 14NOV24    PLAN:  Onc consulted and following  - attempted to reach out to Dr. Castro 12/2 to coordinate care. She said to put the lung cancer on the back burner and to make the pneumonia and PE the focus at this time, with input from ID. No further recs. Reached out again to confirm medication regimen on 12/4.   - attempted to reach out to Dr. Gamboa to coordinate care, he is out of the office until 12/9

## 2024-12-04 NOTE — PLAN OF CARE
Problem: INFECTION - ADULT  Goal: Absence or prevention of progression during hospitalization  Description: INTERVENTIONS:  - Assess and monitor for signs and symptoms of infection  - Monitor lab/diagnostic results  - Monitor all insertion sites, i.e. indwelling lines, tubes, and drains  - Monitor endotracheal if appropriate and nasal secretions for changes in amount and color  - [place] appropriate cooling/warming therapies per order  - Administer medications as ordered  - Instruct and encourage patient and family to use good hand hygiene technique  - Identify and instruct in appropriate isolation precautions for identified infection/condition  Outcome: Progressing     Problem: RESPIRATORY - ADULT  Goal: Achieves optimal ventilation and oxygenation  Description: INTERVENTIONS:  - Assess for changes in respiratory status  - Assess for changes in mentation and behavior  - Position to facilitate oxygenation and minimize respiratory effort  - Oxygen administered by appropriate delivery if ordered  - Initiate smoking cessation education as indicated  - Encourage broncho-pulmonary hygiene including cough, deep breathe, Incentive Spirometry  - Assess the need for suctioning and aspirate as needed  - Assess and instruct to report SOB or any respiratory difficulty  - Respiratory Therapy support as indicated  Outcome: Progressing     Problem: HEMATOLOGIC - ADULT  Goal: Maintains hematologic stability  Description: INTERVENTIONS  - Assess for signs and symptoms of bleeding or hemorrhage  - Monitor labs  - Administer supportive blood products/factors as ordered and appropriate  Outcome: Progressing     Problem: Knowledge Deficit  Goal: Patient/family/caregiver demonstrates understanding of disease process, treatment plan, medications, and discharge instructions  Description: Complete learning assessment and assess knowledge base.  Interventions:  - Provide teaching at level of understanding  - Provide teaching via  preferred learning methods  Outcome: Progressing

## 2024-12-04 NOTE — ASSESSMENT & PLAN NOTE
Patient requiring oxygen 2 L via nasal cannula currently. Oxygen needs likely due to the setting of pneumonia and pulmonary embolism. Also had recent bronchoscopy which can increase oxygen needs initially afterwards.   -patient will likely need oxygen on discharge for a few weeks as her lungs recover. Will order home O2 eval tomorrow, 12/5 and set this up with case management.

## 2024-12-04 NOTE — PROGRESS NOTES
Progress Note - Infectious Disease   Ayla Nye 74 y.o. female MRN: [id number]  Unit/Bed#: S -01 Encounter: [id number]      Impression/Plan:  1.  Abnormal CT chest.  Patient presented with progressive respiratory complaints along with sweats and fevers.  CT ultimately showed these right upper lobe changes.  She has been on antibiotics essentially since admission with cultures being unrevealing.  Based on timeline of bronchoscopy cultures obtained my suspicion is low for a resistant gram-negative infection.  Consideration remains for MRSA pneumonia and cultures of course may have been affected by previous vancomycin.  PJP PCR returned positive, uncertain if this represents colonization or true disease.  Steroids were being tapered prior to presentation and imaging atypical.  LDH mildly elevated.  Fungitell pending.  Symptomatically improving today which I suspect is largely due to steroid increase.  Would still question noninfectious etiology including pneumonitis to patient's immunotherapy given steroids were being tapered down and patient presented with acute symptoms.  Reviewed plans for empiric PJP therapy given atovaquone fairly benign, prophylaxis and plans for steroids with family/providers  Will continue vancomycin with plan for 7 days through 12/5  Last doses of antibiotics ordered  We will follow-up pending Fungitell  Continue atovaquone 750 mg twice daily for total 21 days  Later transition to Bactrim 1 DS 3 times a week as prophylaxis with steroid use  Continue prednisone 40 mg twice daily x 5 days  Would then transition to prednisone 40 mg daily x 5 days  Lastly then would switch to prednisone 20 mg daily (approx 3mg Decadron)  Would defer further steroid tapering then to oncology  Will continue to trend fever curve/vitals  Repeat CBC/chemistry tomorrow to monitor treatment response/dosing  Pharmacy consult for vancomycin monitoring  Ongoing follow-up by pulmonary  Additional supportive  cares per primary  Anticipate discharge shortly after completion of IV antibiotic, please assure atovaquone collected from pharmacy prior to discharge     2.  Acute hypoxic respiratory failure.  Patient with new oxygen requirement since admission which I suspect is multifactorial given the changes on CT and also #3.  Antibiotics as above  Ongoing followed by pulmonary  Oxygen weaning when able  Trend fever curve/WBC     3.  Bilateral pulmonary emboli.  Noted on initial CT.  Ongoing anticoagulation per primary     4.  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  Patient is currently on chemotherapy as well as immunotherapy.  Uncertain if this may be contributing to lung findings now on CT.  Will plan to complete antibiotic course as above  Empiric PJP therapy as above  Recommendation for prophylaxis then as above  Ongoing follow-up by pulmonary  Steroid regimen discussed in detail with pulmonary  Patient will need follow-up with oncology as outpatient     5.  Chronic leukopenia.  Noted known labs.  Appears to be close to patient's baseline.  Will need to continue to monitor while patient remains on antibiotics.  Repeat CBC tomorrow.    Above plan discussed in detail with the patient and her family at bedside  Above plan discussed in detail with pulmonary fellow and primary service resident who are aware of plans for continued therapy, completion of IV antibiotic and potential steroid adjustments    ID consult service will continue to follow while admitted.    Antibiotics:  Vancomycin 6  Atovaquone 2    24 Hour events:  Yesterday and overnight notes reviewed and no acute events noted    Subjective:  Patient seen at bedside with her daughters present and she denied having any nausea, vomiting, chest pain.  She subjectively is feeling better and so uncertain if this is primarily due to atovaquone or possibly steroids.  We reviewed in detail uncertainty of PJP diagnosis, differential when looking at her  lung changes and impact of steroids.  We discussed plans for complete course of treatment given benign nature of atovaquone.  We discussed need for prophylaxis thereafter given ongoing steroids.  Case discussed with pulmonary fellow.    Objective:  Vitals:  Temp:  [96.4 °F (35.8 °C)-98.3 °F (36.8 °C)] 98.3 °F (36.8 °C)  HR:  [66-86] 86  Resp:  [18] 18  BP: (134-152)/(78-85) 134/79  SpO2:  [96 %] 96 %  Temp (24hrs), Av.6 °F (36.4 °C), Min:96.4 °F (35.8 °C), Max:98.3 °F (36.8 °C)  Current: Temperature: 98.3 °F (36.8 °C)    Physical Exam:   General Appearance:  Alert, interactive, nontoxic, no acute distress.   Throat: Oropharynx moist without lesions.    Lungs:   Clear to auscultation bilaterally; no wheezes, rhonchi or rales; respirations unlabored on nasal cannula   Heart:  RRR; no murmur, rub or gallop noted   Abdomen:   Soft, non-tender, non-distended, positive bowel sounds.     Extremities: No clubbing, cyanosis or edema   Skin: No new rashes or lesions. No new draining wounds noted.       Labs, Imaging, & Other studies:   All pertinent labs and imaging studies in PACS were personally reviewed as below.  Results from last 7 days   Lab Units 24  0514 24  1824 24  0559 24  0329   WBC Thousand/uL 6.49  --  3.95* 4.00*   HEMOGLOBIN g/dL 8.9* 8.5* 7.5* 8.5*   PLATELETS Thousands/uL 400*  --  263 270     Results from last 7 days   Lab Units 24  0514 24  0559 24  0329   POTASSIUM mmol/L 4.3 3.7 4.0   CHLORIDE mmol/L 101 104 103   CO2 mmol/L 28 30 31   BUN mg/dL 18 16 13   CREATININE mg/dL 0.82 0.75 0.84   EGFR ml/min/1.73sq m 70 78 68   CALCIUM mg/dL 9.7 8.8 9.3   AST U/L 13 10* 12*   ALT U/L 12 9 10   ALK PHOS U/L 41 33* 38     Results from last 7 days   Lab Units 24  1657 24  1612   BLOOD CULTURE  No Growth at 48 hrs.  No Growth at 48 hrs.  --    GRAM STAIN RESULT   --  1+ Polys  No bacteria seen       Lab interpretation/comments: White blood cell count  mildly elevated today which I suspect is due to steroids    Imaging interpretation/comments: No new imaging overnight    Culture data: No new cultures.  LDH only minimally elevated.  Fungitell pending    External notes: None

## 2024-12-04 NOTE — ASSESSMENT & PLAN NOTE
Component      Latest Ref Rn 2/16/2024 3/12/2024 4/5/2024 4/30/2024 5/20/2024   Absolute Neutrophils      1.85 - 7.62 Thousand/uL 6.06  5.47  4.95  5.71  5.87      Component      Latest Ref Rng 5/28/2024 6/3/2024 6/10/2024 6/18/2024 6/22/2024   Absolute Neutrophils      1.85 - 7.62 Thousand/uL 5.89  6.31  5.21  3.42  3.63      Component      Latest Ref Rn 6/23/2024 6/24/2024 7/1/2024 7/2/2024 7/3/2024 7/15/2024   Absolute Neutrophils      1.85 - 7.62 Thousand/uL 2.38  1.58 (L)  1.81 (L)  2.08  1.83 (L)  2.08      Component      Latest Ref Rn 7/29/2024 8/21/2024 8/22/2024 8/23/2024 8/24/2024   Absolute Neutrophils      1.85 - 7.62 Thousand/uL 2.95  6.57  5.54  6.22  4.46      Component      Latest Ref Rn 9/9/2024 9/10/2024 9/11/2024 9/12/2024 9/13/2024   Absolute Neutrophils      1.85 - 7.62 Thousand/uL 5.00  3.22  5.80  4.26  7.84 (H)      Component      Latest Ref Rn 10/3/2024 10/16/2024 10/21/2024 10/22/2024 10/27/2024   Absolute Neutrophils      1.85 - 7.62 Thousand/uL 7.17  1.58 (L)  2.79  4.39  9.78 (H)      Component      Latest Ref Rn 11/11/2024 11/24/2024   Absolute Neutrophils      1.85 - 7.62 Thousand/uL 9.40 (H)  1.54 (L)       Recent Labs     12/02/24  0329 12/03/24  0559 12/04/24  0514   AST 12* 10* 13   ALT 10 9 12   ALKPHOS 38 33* 41   TBILI 0.32 0.25 0.26         Fever at home 101.3  Urine culture 11/24: < 10,000 cfu/mL mixed contaminants x2  PLAN:  Antibiotics Vanc and Atovaquone  -Oncology consulted  - Pulm consulted: trial of solumedrol 40 mg daily x1, now prednisone  -ID consulted: continue vanc through 12/5  Neutropenic precautions

## 2024-12-04 NOTE — PLAN OF CARE
Problem: Potential for Falls  Goal: Patient will remain free of falls  Description: INTERVENTIONS:  - Educate patient/family on patient safety including physical limitations  - Instruct patient to call for assistance with activity   - Consult OT/PT to assist with strengthening/mobility   - Keep Call bell within reach  - Keep bed low and locked with side rails adjusted as appropriate  - Keep care items and personal belongings within reach  - Initiate and maintain comfort rounds  - Make Fall Risk Sign visible to staff  - Offer Toileting every 2 Hours, in advance of need  - Initiate/Maintain bed/chair alarm  - Obtain necessary fall risk management equipment: yellow bracelet/socks  - Apply yellow socks and bracelet for high fall risk patients  - Consider moving patient to room near nurses station  Outcome: Progressing     Problem: INFECTION - ADULT  Goal: Absence or prevention of progression during hospitalization  Description: INTERVENTIONS:  - Assess and monitor for signs and symptoms of infection  - Monitor lab/diagnostic results  - Monitor all insertion sites, i.e. indwelling lines, tubes, and drains  - Monitor endotracheal if appropriate and nasal secretions for changes in amount and color  - [place] appropriate cooling/warming therapies per order  - Administer medications as ordered  - Instruct and encourage patient and family to use good hand hygiene technique  - Identify and instruct in appropriate isolation precautions for identified infection/condition  Outcome: Progressing     Problem: SAFETY ADULT  Goal: Patient will remain free of falls  Description: INTERVENTIONS:  - Educate patient/family on patient safety including physical limitations  - Instruct patient to call for assistance with activity   - Consult OT/PT to assist with strengthening/mobility   - Keep Call bell within reach  - Keep bed low and locked with side rails adjusted as appropriate  - Keep care items and personal belongings within  reach  - Initiate and maintain comfort rounds  - Make Fall Risk Sign visible to staff  - Offer Toileting every 2 Hours, in advance of need  - Initiate/Maintain bed/chair alarm  - Obtain necessary fall risk management equipment: yellow bracelet/socks  - Apply yellow socks and bracelet for high fall risk patients  - Consider moving patient to room near nurses station  Outcome: Progressing     Problem: Knowledge Deficit  Goal: Patient/family/caregiver demonstrates understanding of disease process, treatment plan, medications, and discharge instructions  Description: Complete learning assessment and assess knowledge base.  Interventions:  - Provide teaching at level of understanding  - Provide teaching via preferred learning methods  Outcome: Progressing     Problem: RESPIRATORY - ADULT  Goal: Achieves optimal ventilation and oxygenation  Description: INTERVENTIONS:  - Assess for changes in respiratory status  - Assess for changes in mentation and behavior  - Position to facilitate oxygenation and minimize respiratory effort  - Oxygen administered by appropriate delivery if ordered  - Initiate smoking cessation education as indicated  - Encourage broncho-pulmonary hygiene including cough, deep breathe, Incentive Spirometry  - Assess the need for suctioning and aspirate as needed  - Assess and instruct to report SOB or any respiratory difficulty  - Respiratory Therapy support as indicated  Outcome: Progressing     Problem: HEMATOLOGIC - ADULT  Goal: Maintains hematologic stability  Description: INTERVENTIONS  - Assess for signs and symptoms of bleeding or hemorrhage  - Monitor labs  - Administer supportive blood products/factors as ordered and appropriate  Outcome: Progressing

## 2024-12-04 NOTE — ASSESSMENT & PLAN NOTE
Worsening right upper lobe infiltrates could represent atypical PCP presentation, radiation pneumonitis or Keytruda toxicity   CXR 11/29/2024 with increased haziness of right upper lung compared to admit CXR, CT chest on admit showed right upper lobe consolidations that appear to be consistent with pneumonia  Persistent fevers on Rocephin, s/p bronchoscopy 11/29 with lymphocyte predominant BAL and cultures with no growth to date, pneumocystis PCR positive   Wean oxygen for goal sats >90%  Will treat for PCP with Atovaquone for 21 days and Prednisone 40 mg BID for 5 days then 40 mg for 5 days then 20 mg until follow up, will need Bactrim MWF for PCP ppx once off Atovaquone

## 2024-12-04 NOTE — CASE MANAGEMENT
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     Palliative care patient 2024     Cancer associated pain 2024     Goals of care, counseling/discussion 2024     Right hip pain 09/10/2024     Altered mental status 2024     Hypothyroidism 2024     Abnormal imaging of central nervous system 2024     Acute metabolic encephalopathy 2024     Malignant neoplasm metastatic to brain (HCC) 2024     Brain mass 2024     Metastatic cancer to brain (HCC) 2024     Visual disturbance 2024     Dehydration 2024     Moderate protein-calorie malnutrition (HCC) 2024     Bilateral leg pain 2024     Insomnia 2024     Malignant neoplasm of upper lobe, right bronchus or lung (HCC) 2024     Age-related osteoporosis without current pathological fracture 2023     Encounter for monitoring of hydroxyurea therapy 2023     JAK2 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 2023     Hypertension 08/10/2022     Mixed hyperlipidemia 08/10/2022     Essential thrombocytosis (HCC) 2020     TB lung, 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     Psoriatic arthritis (HCC) 09/10/2019       LOS (days): 9  Geometric Mean LOS (GMLOS) (days): 4.9  Days to GMLOS:-4.6     OBJECTIVE:  Risk of Unplanned Readmission Score: 42.8         Current admission  status: Inpatient   Preferred Pharmacy:   [id number] - [place] [facility], [place] - [address] [place] [facility] [place] [address]  Phone: [phone] Fax: [phone]    Primary Care Provider: Rafy Angelo MD    Primary Insurance: MEDICARE  Secondary Insurance: AARP    DISCHARGE DETAILS:    Discharge planning discussed with:: Patient and Sandro (Ex-)  Freedom of Choice: Yes  Comments - Freedom of Choice: Reviewed with patient at DC likely need for home O2  CM contacted family/caregiver?: Yes  Were Treatment Team discharge recommendations reviewed with patient/caregiver?: Yes  Did patient/caregiver verbalize understanding of patient care needs?: Yes  Were patient/caregiver advised of the risks associated with not following Treatment Team discharge recommendations?: Yes    Contacts  Patient Contacts: Patient  Relationship to Patient:: Other (Comment)  Contact Method: In Person  Reason/Outcome: Discharge Planning, Continuity of Care    Requested Home Health Care         Is the patient interested in HHC at discharge?: No    DME Referral Provided  Referral made for DME?: No    Other Referral/Resources/Interventions Provided:  Interventions: DME         Treatment Team Recommendation: Home  Discharge Destination Plan:: Home  Transport at Discharge : Family                             IMM Given (Date):: 12/04/24  IMM Given to:: Patient        IMM reviewed with patient.  Patient agrees with discharge determination.     CM met with patient and her ex- Sandro and reviewed anticipated DC home in the next 24-48.  CM explained that if home O2 testing was completed and O2 is necessary for DC that we would order this and coordinate to have it delivered prior to her DC.    Patient and family verbalized understanding.    CM department will continue to follow to assist with discharge coordination.

## 2024-12-04 NOTE — ASSESSMENT & PLAN NOTE
PERC 2  WELLS 2.5  PESI 104 Class III Intermediate risk   [id number]: CT pe study w abdomen pelvis w contrast  PULMONARY ARTERIAL TREE: Embolism straddles between the middle lobe and right lower lobe basal segment arteries, as seen on series 301/112, this is new from the most recent prior.   Additional embolism noted within the left lower lobe basal arteries straddling into the lower lingular segment, as noted on series 301/, also new from prior.  RV diameter at the level of the AV valve = 3. 9 cm  LV diameter at the level of the AV valve = 2.7 cm  Ratio equals 1.4  CLASSIFICATION Acute hemodynamically stable bilateral right sided segmental left basilar associated with SOB most likely provoked from cancer hx while on immunotherapy   ECHO : EF 65%, diastolic dysfunction grade I. Mitral valve has mild annular calcification and tricuspid valve has mild regurgitation.     PLAN  Xarelto started , heparin drip stopped. Price check performed and acceptable for patient. Plan will be this dose for 21 days, then 20 mg daily for 6 months, then 10 mg indefinitely.

## 2024-12-04 NOTE — ASSESSMENT & PLAN NOTE
Noted initially on admit CXR and CT chest, MRSA swab negative  Has been on Rocephin with continued fevers and CXR on 11/29 with worsening RUL infiltrates  Bronchoscopy 11/29 with cultures showing no growth  CRP 22.7 on 11/11, CRP trend since 11/29 172--146--170--175  Fevers resolved after escalating to Vanco/cefepime but BAL showed lymphocyte predominant fluid and no organisms identified on Gram stain

## 2024-12-04 NOTE — ASSESSMENT & PLAN NOTE
Currently being treated with dexamethazone and keppra outpatient    PLAN  Continue home keppra  Dexamethasone switched to prednisone for the time being in the setting of pneumocystic pneumonia  Seizure precautions  Fall precautions  Ativan prn for seizure activity

## 2024-12-05 LAB
ALBUMIN SERPL BCG-MCNC: 3 G/DL (ref 3.5–5)
ALP SERPL-CCNC: 36 U/L (ref 34–104)
ALT SERPL W P-5'-P-CCNC: 11 U/L (ref 7–52)
ANION GAP SERPL CALCULATED.3IONS-SCNC: 7 MMOL/L (ref 4–13)
AST SERPL W P-5'-P-CCNC: 12 U/L (ref 13–39)
BILIRUB SERPL-MCNC: 0.21 MG/DL (ref 0.2–1)
BUN SERPL-MCNC: 21 MG/DL (ref 5–25)
CALCIUM ALBUM COR SERPL-MCNC: 9.9 MG/DL (ref 8.3–10.1)
CALCIUM SERPL-MCNC: 9.1 MG/DL (ref 8.4–10.2)
CHLORIDE SERPL-SCNC: 106 MMOL/L (ref 96–108)
CO2 SERPL-SCNC: 29 MMOL/L (ref 21–32)
CREAT SERPL-MCNC: 0.72 MG/DL (ref 0.6–1.3)
CRP SERPL QL: 75.8 MG/L
ERYTHROCYTE [DISTWIDTH] IN BLOOD BY AUTOMATED COUNT: 15.1 % (ref 11.6–15.1)
GFR SERPL CREATININE-BSD FRML MDRD: 82 ML/MIN/1.73SQ M
GLUCOSE SERPL-MCNC: 75 MG/DL (ref 65–140)
HCT VFR BLD AUTO: 24.3 % (ref 34.8–46.1)
HGB BLD-MCNC: 7.7 G/DL (ref 11.5–15.4)
MCH RBC QN AUTO: 31.2 PG (ref 26.8–34.3)
MCHC RBC AUTO-ENTMCNC: 31.7 G/DL (ref 31.4–37.4)
MCV RBC AUTO: 98 FL (ref 82–98)
NRBC BLD AUTO-RTO: 0 /100 WBCS
PLATELET # BLD AUTO: 332 THOUSANDS/UL (ref 149–390)
PMV BLD AUTO: 9 FL (ref 8.9–12.7)
POTASSIUM SERPL-SCNC: 3.7 MMOL/L (ref 3.5–5.3)
PROT SERPL-MCNC: 6.1 G/DL (ref 6.4–8.4)
RBC # BLD AUTO: 2.47 MILLION/UL (ref 3.81–5.12)
SODIUM SERPL-SCNC: 142 MMOL/L (ref 135–147)
WBC # BLD AUTO: 6.85 THOUSAND/UL (ref 4.31–10.16)

## 2024-12-05 PROCEDURE — 86140 C-REACTIVE PROTEIN: CPT

## 2024-12-05 PROCEDURE — NC001 PR NO CHARGE: Performed by: INTERNAL MEDICINE

## 2024-12-05 PROCEDURE — 80053 COMPREHEN METABOLIC PANEL: CPT

## 2024-12-05 PROCEDURE — 99233 SBSQ HOSP IP/OBS HIGH 50: CPT | Performed by: INTERNAL MEDICINE

## 2024-12-05 PROCEDURE — 85027 COMPLETE CBC AUTOMATED: CPT

## 2024-12-05 RX ORDER — PREDNISONE 20 MG/1
60 TABLET ORAL DAILY
Status: DISCONTINUED | OUTPATIENT
Start: 2024-12-08 | End: 2024-12-06 | Stop reason: HOSPADM

## 2024-12-05 RX ORDER — PREDNISONE 20 MG/1
40 TABLET ORAL DAILY
Status: DISCONTINUED | OUTPATIENT
Start: 2024-12-13 | End: 2024-12-06 | Stop reason: HOSPADM

## 2024-12-05 RX ORDER — PREDNISONE 20 MG/1
20 TABLET ORAL DAILY
Status: DISCONTINUED | OUTPATIENT
Start: 2024-12-18 | End: 2024-12-06 | Stop reason: HOSPADM

## 2024-12-05 RX ORDER — PREDNISONE 20 MG/1
40 TABLET ORAL 2 TIMES DAILY WITH MEALS
Status: DISCONTINUED | OUTPATIENT
Start: 2024-12-05 | End: 2024-12-06 | Stop reason: HOSPADM

## 2024-12-05 RX ORDER — ATOVAQUONE 750 MG/5ML
750 SUSPENSION ORAL DAILY
Qty: 95 ML | Refills: 0 | Status: SHIPPED | OUTPATIENT
Start: 2024-12-05 | End: 2024-12-07

## 2024-12-05 RX ADMIN — GABAPENTIN 100 MG: 100 CAPSULE ORAL at 06:30

## 2024-12-05 RX ADMIN — VANCOMYCIN HYDROCHLORIDE 1250 MG: 5 INJECTION, POWDER, LYOPHILIZED, FOR SOLUTION INTRAVENOUS at 11:05

## 2024-12-05 RX ADMIN — RIVAROXABAN 15 MG: 15 TABLET, FILM COATED ORAL at 08:51

## 2024-12-05 RX ADMIN — PREDNISONE 40 MG: 20 TABLET ORAL at 08:50

## 2024-12-05 RX ADMIN — RIVAROXABAN 15 MG: 15 TABLET, FILM COATED ORAL at 16:20

## 2024-12-05 RX ADMIN — PANTOPRAZOLE SODIUM 40 MG: 40 TABLET, DELAYED RELEASE ORAL at 06:30

## 2024-12-05 RX ADMIN — ATOVAQUONE 750 MG: 750 SUSPENSION ORAL at 16:20

## 2024-12-05 RX ADMIN — ATOVAQUONE 750 MG: 750 SUSPENSION ORAL at 08:51

## 2024-12-05 RX ADMIN — Medication 1 PACKET: at 08:51

## 2024-12-05 RX ADMIN — LEVETIRACETAM 1000 MG: 250 TABLET, FILM COATED ORAL at 08:51

## 2024-12-05 RX ADMIN — CYANOCOBALAMIN TAB 500 MCG 1000 MCG: 500 TAB at 08:51

## 2024-12-05 RX ADMIN — PREDNISONE 40 MG: 20 TABLET ORAL at 16:20

## 2024-12-05 RX ADMIN — LEVETIRACETAM 1000 MG: 250 TABLET, FILM COATED ORAL at 21:06

## 2024-12-05 RX ADMIN — FOLIC ACID 1 MG: 1 TABLET ORAL at 08:54

## 2024-12-05 RX ADMIN — GABAPENTIN 100 MG: 100 CAPSULE ORAL at 11:05

## 2024-12-05 RX ADMIN — GABAPENTIN 300 MG: 300 CAPSULE ORAL at 21:06

## 2024-12-05 RX ADMIN — LEVOTHYROXINE SODIUM 50 MCG: 0.05 TABLET ORAL at 06:30

## 2024-12-05 NOTE — CASE MANAGEMENT
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     Palliative care patient 2024     Cancer associated pain 2024     Goals of care, counseling/discussion 2024     Right hip pain 09/10/2024     Altered mental status 2024     Hypothyroidism 2024     Abnormal imaging of central nervous system 2024     Acute metabolic encephalopathy 2024     Malignant neoplasm metastatic to brain (HCC) 2024     Brain mass 2024     Metastatic cancer to brain (HCC) 2024     Visual disturbance 2024     Dehydration 2024     Moderate protein-calorie malnutrition (HCC) 2024     Bilateral leg pain 2024     Insomnia 2024     Malignant neoplasm of upper lobe, right bronchus or lung (HCC) 2024     Age-related osteoporosis without current pathological fracture 2023     Encounter for monitoring of hydroxyurea therapy 2023     JAK2 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 2023     Hypertension 08/10/2022     Mixed hyperlipidemia 08/10/2022     Essential thrombocytosis (HCC) 2020     TB lung, 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     Psoriatic arthritis (HCC) 09/10/2019       LOS (days): 10  Geometric Mean LOS (GMLOS) (days): 4.9  Days to GMLOS:-5.6     OBJECTIVE:  Risk of Unplanned Readmission Score: 44.15         Current admission  status: Inpatient   Preferred Pharmacy:   [id number] - [facility], [place] - [address] [facility]  [address]  [facility] [place] [address]  Phone: [phone] Fax: [phone]    [facility] ([place] - [place] - [address] [facility]  [address] [place] [address]  Phone: [phone] Fax: [phone]    Primary Care Provider: Rafy Angelo MD    Primary Insurance: MEDICARE  Secondary Insurance: AARP    DISCHARGE DETAILS:                                          Other Referral/Resources/Interventions Provided:  Interventions: Prescription Price Check       CM was asked to verify coverage and cost of Atovaquone which was sent to [facility].  CM called the pharmacy and spoke with Helen who confirmed that the medication was covered by insurance with no prior auth but her co-payment will be $38.38.    CM updated the [facility] provider on the above and discussed ordered patient's home O2 eval for anticipated DC in the next 24 hours.    CM department will continue to follow to assist with discharge coordination.

## 2024-12-05 NOTE — PHYSICAL THERAPY NOTE
12/05/24 0837   Note Type   Note type Screen   Additional Comments PT orders received and chart reviewed. Per Kathy, OT, pt is walking around in her room IND and expressing no need for skilled IP PT at this time. Will dc pt from skilled IP PT caseload. If new need arises, please reconsult.   Discharge Recommendation   Rehab Resource Intensity Level, PT No post-acute rehabilitation needs   Licensure   [place] License Number  Marsha Magallon PT

## 2024-12-05 NOTE — ASSESSMENT & PLAN NOTE
Component      Latest Ref Rn 2/16/2024 3/12/2024 4/5/2024 4/30/2024 5/20/2024   Absolute Neutrophils      1.85 - 7.62 Thousand/uL 6.06  5.47  4.95  5.71  5.87      Component      Latest Ref Rng 5/28/2024 6/3/2024 6/10/2024 6/18/2024 6/22/2024   Absolute Neutrophils      1.85 - 7.62 Thousand/uL 5.89  6.31  5.21  3.42  3.63      Component      Latest Ref Rn 6/23/2024 6/24/2024 7/1/2024 7/2/2024 7/3/2024 7/15/2024   Absolute Neutrophils      1.85 - 7.62 Thousand/uL 2.38  1.58 (L)  1.81 (L)  2.08  1.83 (L)  2.08      Component      Latest Ref Rn 7/29/2024 8/21/2024 8/22/2024 8/23/2024 8/24/2024   Absolute Neutrophils      1.85 - 7.62 Thousand/uL 2.95  6.57  5.54  6.22  4.46      Component      Latest Ref Rn 9/9/2024 9/10/2024 9/11/2024 9/12/2024 9/13/2024   Absolute Neutrophils      1.85 - 7.62 Thousand/uL 5.00  3.22  5.80  4.26  7.84 (H)      Component      Latest Ref Rn 10/3/2024 10/16/2024 10/21/2024 10/22/2024 10/27/2024   Absolute Neutrophils      1.85 - 7.62 Thousand/uL 7.17  1.58 (L)  2.79  4.39  9.78 (H)      Component      Latest Ref Rn 11/11/2024 11/24/2024   Absolute Neutrophils      1.85 - 7.62 Thousand/uL 9.40 (H)  1.54 (L)       Recent Labs     12/03/24  0559 12/04/24  0514 12/05/24  0638   AST 10* 13 12*   ALT 9 12 11   ALKPHOS 33* 41 36   TBILI 0.25 0.26 0.21         Fever at home 101.3  Urine culture 11/24: < 10,000 cfu/mL mixed contaminants x2  PLAN:  Antibiotics Vanc and Atovaquone  -Oncology consulted  - Pulm consulted: trial of solumedrol 40 mg daily x1, now prednisone as above  -ID consulted: continue vanc through 12/5  Neutropenic precautions

## 2024-12-05 NOTE — PROGRESS NOTES
Vancomycin treatment is scheduled on end on 12/05, no more levels are required .  Pharmacy will sign off.

## 2024-12-05 NOTE — OCCUPATIONAL THERAPY NOTE
Occupational Therapy Screen Note        Patient Name: Ayla Nye  Today's Date: 12/5/2024 12/05/24 0808   OT Last Visit   OT Visit Date 12/05/24   Note Type   Note type Screen   Additional Comments OT orders received.  Chart review performed. Based on conversation with NOEL Chua, pt appears to be performing at functional baseline.  Pt has been (I) in room and getting to/from bathroom without assistance. Pt reports walking laps in the hallway without difficulty. OT performed screen and discussed results with patient.  Pt does not demonstrate need for skilled OT at this time.  Pt will not be seen further by OT services. DC OT orders. If pt's functional status declines please re-consult.     Kathy Kelley MS, OTR/L

## 2024-12-05 NOTE — ASSESSMENT & PLAN NOTE
Patient reached sepsis criteria AM of 11/26 due to fevers, elevated wbc count, and HR >90. LA ordered and normal. Procal continues to be normal. Will continue to monitor with antibiotics for penumonia, and heparin drip for PE. Will continue to get daily labs.   AM of 11/29: patient no longer septic due to no fevers in the past 24 hours, but still having mild temperature elevations and Hrs in the 90s. Will monitor.   12/5: no more fevers, Hrs 80s-90s, continues to need oxygen via nasal cannula, 1 L.

## 2024-12-05 NOTE — DISCHARGE INSTR - AVS FIRST PAGE
Dear Ayla Nye,     It was our pleasure to care for you here at [facility].  It is our hope that we were always able to exceed the expected standards for your care during your stay. You were hospitalized due to pulmonary embolism and pneumonia. You were cared for on the South 4th floor by Fabby Monzon DO under the service of Gabriela Skinner MD with the [facility] who covers for your primary care physician (PCP), Rafy Angelo MD, while you were hospitalized.  If you have any questions or concerns related to this hospitalization, you may contact us at .  For follow up as well as any medication refills, we recommend that you follow up with your primary care physician.  A registered nurse will reach out to you by phone within a few days after your discharge to answer any additional questions that you may have after going home.  However, at this time we provide for you here, the most important instructions / recommendations at discharge:     Notable Medication Adjustments -   Keep taking blood thinner Xarelto 15 mg by mouth twice a day, for your pulmonary embolism. Total number of treatment days at this dose is 21 days. (Started on 11/28, you will take until 12/18.) After 12/18, and starting 12/19, you will then decrease the dose to 20 mg daily for 6 months, and then decrease after that to 10 mg daily indefinitely. Your PCP will aid in managing this prescription for these changes, but we will fill for you the rest of the 21 day course, and the first month of your decreased dose (the 20 mg daily.) For today, you have been given a dose this morning, and will still need one tonight when you are home; this will complete day 9 out of 21.   Continue taking Atovaquone 750 mg twice a day with meals for the pneumonia. Total number of days needed is 21 days. Today is day 4. Last doses will be on 12/23. You were given a dose this morning, and will still  need one tonight. This medication was sent to [facility] at [place].   After completing the 21 days of Atovaquone, you will start taking Bactrim 1 double strength tablet three times a week, likely on Monday, Wednesday, Friday, to prevent pneumocystic pneumonia from happening again. Start after 12/24/24. Refills can be made by Dr. Castro, so let her know.   Here is your prednisone taper: you want a total of 5 days for the 40 mg twice a day, this was started on 12/3. Today is day 4, so take this dose today and tomorrow. On 12/8, you will decrease the dose to 60 mg daily for 5 days (12/8-12/12.) On 12/13, you will decrease the dose to 40 mg daily (12/13-12/17.) On 12/18, you will decrease the dose to 20 mg daily, and then continue taking this dose until changed by your outpatient oncologists.   Disregard the Eliquis that was sent. This was the blood thinner that your insurance did not prefer. Do not take it, take Xarelto instead.   Testing Required after Discharge -   The outpatient pulmonologist plans to do a repeat CT scan of your chest around the date of 1/5/25.   ** Please contact your PCP to request testing orders for any of the testing recommended here **  Important follow up information -   Please follow up with your PCP within 1 week  Please attend your appointment with Dr. Castro on 12/10/24  Please attend your appointment with Dr. Gamboa on 1/2/25  Please attend your outpatient pulmonology appointment on 1/29/25, they may do follow up imaging on the pneumonia  Other Instructions -   You may need the oxygen for a few weeks after discharge, continue to wear this and allow the pulmonologist to determine if and when you still need it.   Please review this entire after visit summary as additional general instructions including medication list, appointments, activity, diet, any pertinent wound care, and other additional recommendations from your care team that may be provided for you.      Sincerely,      Fabby Monzon DO

## 2024-12-05 NOTE — ASSESSMENT & PLAN NOTE
PERC 2  WELLS 2.5  PESI 104 Class III Intermediate risk   [id number]: CT pe study w abdomen pelvis w contrast  PULMONARY ARTERIAL TREE: Embolism straddles between the middle lobe and right lower lobe basal segment arteries, as seen on series 301/112, this is new from the most recent prior.   Additional embolism noted within the left lower lobe basal arteries straddling into the lower lingular segment, as noted on series 301/, also new from prior.  RV diameter at the level of the AV valve = 3. 9 cm  LV diameter at the level of the AV valve = 2.7 cm  Ratio equals 1.4  CLASSIFICATION Acute hemodynamically stable bilateral right sided segmental left basilar associated with SOB most likely provoked from cancer hx while on immunotherapy   ECHO : EF 65%, diastolic dysfunction grade I. Mitral valve has mild annular calcification and tricuspid valve has mild regurgitation.     PLAN  Xarelto started , heparin drip stopped. Price check performed and acceptable for patient. Plan will be this dose for 21 days, then 20 mg daily for 6 months, then 10 mg indefinitely.

## 2024-12-05 NOTE — PROGRESS NOTES
Progress Note - Infectious Disease   Ayla Nye 74 y.o. female MRN: [id number]  Unit/Bed#: S -01 Encounter: [id number]      Impression/Plan:  1.  Abnormal CT chest.  Patient presented with progressive respiratory complaints along with sweats and fevers.  CT ultimately showed these right upper lobe changes.  She has been on antibiotics essentially since admission with cultures being unrevealing.  Based on timeline of bronchoscopy cultures obtained my suspicion is low for a resistant gram-negative infection.  Consideration remains for MRSA pneumonia and cultures of course may have been affected by previous vancomycin.  PJP PCR returned positive, uncertain if this represents colonization or true disease.  Steroids were being tapered prior to presentation and imaging atypical.  LDH mildly elevated.  Fungitell pending.  Symptomatically improving which I suspect is largely due to steroid increase.  Would still question noninfectious etiology including pneumonitis to patient's immunotherapy given steroids were being tapered down and patient presented with acute symptoms.  Plan remains for empiric PJP therapy given atovaquone fairly benign, prophylaxis and further steroid tapers as outpatient.  Complete 7-day course of vancomycin today  Last doses of antibiotics ordered  Follow-up pending Fungitell for completeness  Continue atovaquone 750 mg twice daily for total 21 days  Later transition to Bactrim 1 DS 3 times a week as prophylaxis with steroid use  Steroid taper as per oncology  Will continue to trend fever curve/vitals  Repeat CBC/chemistry tomorrow to monitor treatment response/dosing  Pharmacy consult for vancomycin monitoring  Ongoing follow-up by pulmonary  Additional supportive cares per primary  No anticipated follow-up in the ID office     2.  Acute hypoxic respiratory failure.  Patient with new oxygen requirement since admission which I suspect is multifactorial given the changes on CT and also  #3.  Antibiotics as above  Ongoing followed by pulmonary  Oxygen weaning when able  Trend fever curve/WBC     3.  Bilateral pulmonary emboli.  Noted on initial CT.  Ongoing anticoagulation per primary     4.  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  Patient is currently on chemotherapy as well as immunotherapy.  Uncertain if this may be contributing to lung findings now on CT.  Will plan to complete antibiotic course as above  Empiric PJP therapy as above  Recommendation for prophylaxis then as above  Ongoing follow-up by pulmonary  Steroid regimen adjusted after discussion with oncology  Patient will need follow-up with oncology as outpatient     5.  Chronic leukopenia.  Noted known labs.  Appears to be close to patient's baseline.  Will need to continue to monitor while patient remains on antibiotics.  Repeat CBC tomorrow.     Above plan discussed briefly with the patient at bedside  Above plan discussed in detail with primary service as well as pulmonary fellow in terms of adjustments now made to steroid regimen    ID consult service will continue to follow.     Antibiotics:  Vancomycin 7  Atovaquone 3     24 Hour events:  Yesterday and overnight notes reviewed and no acute events noted    Subjective:  Patient seen at bedside and she denied having any nausea, vomiting, chest pain.  Seems to be noticing some less hypoxia with exertion.  Overall feels well and is tolerating medications.  Primary service reached out to oncology and steroid taper adjusted.    Objective:  Vitals:  Temp:  [96.8 °F (36 °C)-97.2 °F (36.2 °C)] 97.2 °F (36.2 °C)  HR:  [73-87] 81  Resp:  [15-18] 18  BP: (137-147)/(72-87) 147/87  SpO2:  [92 %-93 %] 92 %  Temp (24hrs), Av °F (36.1 °C), Min:96.8 °F (36 °C), Max:97.2 °F (36.2 °C)  Current: Temperature: (!) 97.2 °F (36.2 °C)    Physical Exam:   General Appearance:  Alert, interactive, nontoxic, no acute distress.   Abdomen:   Soft, non-tender, non-distended, positive bowel  sounds.     Extremities: No clubbing, cyanosis or edema   Skin: No new rashes or lesions. No draining wounds noted.       Labs, Imaging, & Other studies:   All pertinent labs and imaging studies in PACS were personally reviewed as below.  Results from last 7 days   Lab Units 12/05/24  0638 12/04/24  0514 12/03/24  1824 12/03/24  0559   WBC Thousand/uL 6.85 6.49  --  3.95*   HEMOGLOBIN g/dL 7.7* 8.9* 8.5* 7.5*   PLATELETS Thousands/uL 332 400*  --  263     Results from last 7 days   Lab Units 12/05/24  0638 12/04/24  0514 12/03/24  0559   POTASSIUM mmol/L 3.7 4.3 3.7   CHLORIDE mmol/L 106 101 104   CO2 mmol/L 29 28 30   BUN mg/dL 21 18 16   CREATININE mg/dL 0.72 0.82 0.75   EGFR ml/min/1.73sq m 82 70 78   CALCIUM mg/dL 9.1 9.7 8.8   AST U/L 12* 13 10*   ALT U/L 11 12 9   ALK PHOS U/L 36 41 33*     Results from last 7 days   Lab Units 12/01/24  1657 11/29/24  1612   BLOOD CULTURE  No Growth at 72 hrs.  No Growth at 72 hrs.  --    GRAM STAIN RESULT   --  1+ Polys  No bacteria seen       Lab interpretation/comments: No leukocytosis    Imaging interpretation/comments: No new imaging    Culture data: Fungitell pending    External notes: None

## 2024-12-05 NOTE — ASSESSMENT & PLAN NOTE
Stage III NSCLC, new onset brain mets 7/30/24  Pt is on immunotherapy ketruda and carboplatin last dose 14NOV24   Oncologist Dr. Castro with [facility].    Radiologist Dr. Gamboa with [facility]    OP Pembrolizumab + PEMEtrexed + CARBOplatin Q 21 Days Day 1 Cycle 3 due 05DEC24  Last dose 14NOV24    PLAN:  Onc consulted and following  - attempted to reach out to Dr. Castro to coordinate care. Her prednisone recs are below under pneumonia  - attempted to reach out to Dr. Gamboa to coordinate care, he is out of the office until 12/9

## 2024-12-05 NOTE — PROGRESS NOTES
Progress Note - Pulmonology   Name: Ayla Nye 74 y.o. female I MRN: [id number]  Unit/Bed#: S -01 I Date of Admission: 11/24/2024   Date of Service: 12/5/2024 I Hospital Day: 10     Assessment & Plan  Acute hypoxic respiratory failure (HCC)  Worsening right upper lobe infiltrates could represent atypical PCP presentation, radiation pneumonitis or Keytruda toxicity   CXR 11/29/2024 with increased haziness of right upper lung compared to admit CXR, CT chest on admit showed right upper lobe consolidations that appear to be consistent with pneumonia  Persistent fevers on Rocephin, s/p bronchoscopy 11/29 with lymphocyte predominant BAL and cultures with no growth to date, pneumocystis PCR positive   Wean oxygen for goal sats >90%  Will treat for PCP with Atovaquone for 21 days, discussed with Oncologist and will taper Prednisone 40 mg BID for 5 days then 60 mg for 5 days then 40 mg for 5 days then 20 mg until follow up, will need Bactrim MWF for PCP ppx once off Atovaquone  Pulmonary will sign off at this time and will follow-up as outpatient with repeat imaging  Pneumonia  Noted initially on admit CXR and CT chest, MRSA swab negative  Has been on Rocephin with continued fevers and CXR on 11/29 with worsening RUL infiltrates  Bronchoscopy 11/29 with cultures showing no growth  CRP 22.7 on 11/11, CRP peaked at 175 now improved to 75  Fevers resolved after escalating to Vanco/cefepime but BAL showed lymphocyte predominant fluid and no organisms identified on Gram stain  Acute pulmonary embolism (HCC)  Likely provoked in setting of known adeno-NSCLC with brain metastases  Continue Xarelto, will likely need lifelong given metastatic cancer  Malignant neoplasm of upper lobe, right bronchus or lung (HCC)  Follows with Dr. Penn, currently on cycle 2 of pemetrexed/carboplatin/pembrolizumab with last infusion 11/14/2024  Neutropenia (HCC)  Insetting of chemotherapy, may be masking typical immune response  explaining negative procalcitonin and low WBC    24 Hour Events : Feeling better today  Subjective : She is doing better today and denies any worsening symptoms.  She is nonverbal, but is getting better.  She was able to get around and walk around the room with less difficulty.  She is hoping to get up and take a shower today and see how that goes.  Discussed plan to follow-up in pulmonary clinic with repeat imaging and she was agreeable to this.  She will follow-up with her oncologist for continued taper when she gets to 20 mg.    Objective :  Temp:  [96.8 °F (36 °C)-97.2 °F (36.2 °C)] 97.2 °F (36.2 °C)  HR:  [73-87] 81  BP: (137-147)/(72-87) 147/87  Resp:  [15-18] 18  SpO2:  [92 %-93 %] 92 %  O2 Device: Nasal cannula  Nasal Cannula O2 Flow Rate (L/min):  [3 L/min] 3 L/min    Physical Exam  Vitals reviewed.   Constitutional:       General: She is not in acute distress.     Appearance: She is not toxic-appearing.   Cardiovascular:      Rate and Rhythm: Normal rate and regular rhythm.   Pulmonary:      Effort: Pulmonary effort is normal. No respiratory distress.      Breath sounds: No wheezing or rales.   Neurological:      Mental Status: She is alert.           Lab Results: I have reviewed the following results:   .     12/05/24  0638   WBC 6.85   HGB 7.7*   HCT 24.3*      SODIUM 142   K 3.7      CO2 29   BUN 21   CREATININE 0.72   GLUC 75   AST 12*   ALT 11   ALB 3.0*   TBILI 0.21   ALKPHOS 36     ABG: No new results in last 24 hours.    Eliseo Small M.D.  Pulmonary & Critical Care Fellow, PGY-IV

## 2024-12-05 NOTE — PROGRESS NOTES
Progress Note - Hospitalist   Name: Ayla Nye 74 y.o. female I MRN: [id number]  Unit/Bed#: S -01 I Date of Admission: 2024   Date of Service: 2024 I Hospital Day: 10    Assessment & Plan  Malignant neoplasm of upper lobe, right bronchus or lung (HCC)  Stage III NSCLC, new onset brain mets 24  Pt is on immunotherapy ketruda and carboplatin last dose    Oncologist Dr. Castro with [facility].    Radiologist Dr. Gamboa with [facility]    OP Pembrolizumab + PEMEtrexed + CARBOplatin Q 21 Days Day 1 Cycle 3 due [id number]  Last dose     PLAN:  Onc consulted and following  - attempted to reach out to Dr. Castro to coordinate care. Her prednisone recs are below under pneumonia  - attempted to reach out to Dr. Gamboa to coordinate care, he is out of the office until   Malignant neoplasm metastatic to brain (HCC)  Currently being treated with dexamethazone and keppra outpatient    PLAN  Continue home keppra  Dexamethasone switched to prednisone for the time being in the setting of pneumocystic pneumonia  Seizure precautions  Fall precautions  Ativan prn for seizure activity   Anemia    Blood Pressure: 147/87  Recent Labs     24  1824 24  0514 24  0638   HGB 8.5* 8.9* 7.7*   BL 8-11    Plan:  -monitor, on Xarelto.   - on AM of , patient had hemoglobin of 6.6. no coughing up blood, or blood in stool or urine. Only slight bleeding with blowing her nose that she notes. Prepared and gave 1 unit of leukoreduced PRBCs. Repeat H&H afterwards was 9.2.  Transfuse if Hb < 7      Acute pulmonary embolism (HCC)  PERC 2  WELLS 2.5  PESI 104 Class III Intermediate risk   Imagin: CT pe study w abdomen pelvis w contrast  PULMONARY ARTERIAL TREE: Embolism straddles between the middle lobe and right lower lobe basal segment arteries, as seen on series 301/112, this is new from the most recent prior.   Additional embolism noted within the left lower lobe basal arteries straddling  "into the lower lingular segment, as noted on series 301/98, also new from prior.  RV diameter at the level of the AV valve = 3. 9 cm  LV diameter at the level of the AV valve = 2.7 cm  Ratio equals 1.4  CLASSIFICATION Acute hemodynamically stable bilateral right sided segmental left basilar associated with SOB most likely provoked from cancer hx while on immunotherapy   ECHO 11/25: EF 65%, diastolic dysfunction grade I. Mitral valve has mild annular calcification and tricuspid valve has mild regurgitation.     PLAN  Xarelto started 11/28, heparin drip stopped. Price check performed and acceptable for patient. Plan will be this dose for 21 days, then 20 mg daily for 6 months, then 10 mg indefinitely.     Pneumonia    Recent Labs     12/03/24  0559 12/04/24  0514 12/05/24  0638   WBC 3.95* 6.49 6.85     Recent Labs     12/03/24  0559 12/04/24  0514 12/05/24  0638   CREATININE 0.75 0.82 0.72   EGFR 78 70 82     Estimated Creatinine Clearance: 54.2 mL/min (by C-G formula based on SCr of 0.72 mg/dL).  No results for input(s): \"LACTICACID\" in the last 72 hours.    No results for input(s): \"PROCALCITONI\" in the last 72 hours.      Vitals:    12/05/24 0730   BP: 147/87   Pulse: 81   Resp: 18   Temp:    SpO2: 92%     Temperature: (!) 97.2 °F (36.2 °C)  Temp Source: Oral        No results for input(s): \"BLOODCX\", \"SPUTUMCULTUR\", \"GRAMSTAIN\", \"URINECX\", \"WOUNDCULT\", \"BODYFLUIDCUL\", \"MRSACULTURE\", \"INFLUAPCR\", \"INFLUBPCR\", \"RSVPCR\", \"LEGIONELLAUR\", \"STPU\", \"CDIFFTOXINB\" in the last 72 hours.      CT pe study w abdomen pelvis w contrast  Result Date: 11/25/2024  Right upper lobe opacities consistent with pneumonia 4.  Mild pulmonary edema       DEFINITIONS   SIRS: two of the following that cannot be better explained by another cause  HR:>90/min  and WBC: <4x109/L (<4000/mm3), >12x109/L (>12,000/mm3), or =10% bands  SEPSIS  SIRS AND  Confirmed OR Suspected infection Lungs  qSOFA=0  Blood cultures negative at 72 hours  Procal " continues to be negative  MRSA swab negative, vanc stopped 11/27      PLAN:  LABS:   Trend CBC and fever as markers of ongoing infection  Pneumococcal Antibody 23 ordered, to be followed up outpatient. Ordered prevnar 20 vaccine her for which was administered on 11/27.  Bronchoscopy results so far: Bronchoalveolar lavage: 2% neutrophils. 58% lymphs, 40% macrophage, total count 100, bronchial gram stain 1+ polys, no bacteria seen. AFB culture with stain negative for acid fast bacilli. Fungal cx no growth, virus cx negative at 24 hours, positive for pneumocystis PCR    REASSESS DAILY: Reassess pt response to txt daily. Document improvement or worsening status. Patient had oxygen needs starting on 11/28. Currently on 1 L nasal cannula, an improvement from yesterday where she was on 2 L. Home O2 eval ordered for discharge planning.  Given tessalon perles PRN for cough.   Repeated chest x-ray 11/29 which showed worsening right upper lobe pneumonia. Consulted pulmonology and had bronchoscopy 11/29. Pending tests: legionella culture, aspergillus antigen, Fungitell  PT/OT ordered for discharge recommendations, no rehab needs on discharge    Treatment medications  Levoquin 750mg IV q48hrs (renal dose) Given one dose 24NOV24, stopped and switched to ceftriaxone 1,000 mg Q24Hr on 11/25. Stopped 11/29.  Vanc 750mg IV q12hrs Started 23:00 [id number]. Stopped on 11/27 due to negative MRSA cx, restarted due to lack of improvement. Will finish last dose 12/5 AM.   Current regimen per pulm and ID: Finish last dose of Vanc tomorrow, 12/5.  Atovaquone 750 mg BID for 21 days started 12/3, then switch to Bactrim 1 double strength tablet MFW as prophylaxis with steroid use. Prednisone 40 mg BID start date 12/3. Plan will be this dose for 5 days, then decreased to 60 mg daily, for 5 more days, then 40 mg daily for 5 days, then switch to 20 mg daily until outpatient follow up with medical oncology. Pulm follow up outpatient as well  after discharge.   Patient can potentially leave tomorrow after vanc if comfortable    NUTRITION:   Good nutrition and tight glucose management   No current benefit of IV Vit C  Neutropenia (HCC)      Component      Latest Ref Rng 2/16/2024 3/12/2024 4/5/2024 4/30/2024 5/20/2024   Absolute Neutrophils      1.85 - 7.62 Thousand/uL 6.06  5.47  4.95  5.71  5.87      Component      Latest Ref Rng 5/28/2024 6/3/2024 6/10/2024 6/18/2024 6/22/2024   Absolute Neutrophils      1.85 - 7.62 Thousand/uL 5.89  6.31  5.21  3.42  3.63      Component      Latest Ref Rng 6/23/2024 6/24/2024 7/1/2024 7/2/2024 7/3/2024 7/15/2024   Absolute Neutrophils      1.85 - 7.62 Thousand/uL 2.38  1.58 (L)  1.81 (L)  2.08  1.83 (L)  2.08      Component      Latest Ref Rng 7/29/2024 8/21/2024 8/22/2024 8/23/2024 8/24/2024   Absolute Neutrophils      1.85 - 7.62 Thousand/uL 2.95  6.57  5.54  6.22  4.46      Component      Latest Ref Rng 9/9/2024 9/10/2024 9/11/2024 9/12/2024 9/13/2024   Absolute Neutrophils      1.85 - 7.62 Thousand/uL 5.00  3.22  5.80  4.26  7.84 (H)      Component      Latest Ref Rng 10/3/2024 10/16/2024 10/21/2024 10/22/2024 10/27/2024   Absolute Neutrophils      1.85 - 7.62 Thousand/uL 7.17  1.58 (L)  2.79  4.39  9.78 (H)      Component      Latest Ref Rng 11/11/2024 11/24/2024   Absolute Neutrophils      1.85 - 7.62 Thousand/uL 9.40 (H)  1.54 (L)       Recent Labs     12/03/24  0559 12/04/24  0514 12/05/24  0638   AST 10* 13 12*   ALT 9 12 11   ALKPHOS 33* 41 36   TBILI 0.25 0.26 0.21         Fever at home 101.3  Urine culture 11/24: < 10,000 cfu/mL mixed contaminants x2  PLAN:  Antibiotics Vanc and Atovaquone  -Oncology consulted  - Pulm consulted: trial of solumedrol 40 mg daily x1, now prednisone as above  -ID consulted: continue vanc through 12/5  Neutropenic precautions  Sepsis, unspecified organism (HCC)  Patient reached sepsis criteria AM of 11/26 due to fevers, elevated wbc count, and HR >90. LA ordered and normal.  Procal continues to be normal. Will continue to monitor with antibiotics for penumonia, and heparin drip for PE. Will continue to get daily labs.   AM of 11/29: patient no longer septic due to no fevers in the past 24 hours, but still having mild temperature elevations and Hrs in the 90s. Will monitor.   12/5: no more fevers, Hrs 80s-90s, continues to need oxygen via nasal cannula, 1 L.  Acute hypoxic respiratory failure (HCC)  Patient requiring oxygen 1 L via nasal cannula currently. Oxygen needs likely due to the setting of pneumonia and pulmonary embolism. Also had recent bronchoscopy which can increase oxygen needs initially afterwards.   -patient will likely need oxygen on discharge for a few weeks as her lungs recover. Will order home O2 eval and set this up with case management on the day she leaves.    VTE Pharmacologic Prophylaxis: VTE Score: 9 High Risk (Score >/= 5) - Pharmacological DVT Prophylaxis Ordered: rivaroxaban (Xarelto). Sequential Compression Devices Ordered.    Mobility:   Basic Mobility Inpatient Raw Score: 24  JH-HLM Goal: 8: Walk 250 feet or more  JH-HLM Achieved: 8: Walk 250 feet ot more  JH-HLM Goal achieved. Continue to encourage appropriate mobility.    Patient Centered Rounds: I performed bedside rounds with nursing staff today.   Discussions with Specialists or Other Care Team Provider: ID, pulm, nursing, case management, attending    Education and Discussions with Family / Patient: Updated  (daughter) via phone.    Current Length of Stay: 10 day(s)  Current Patient Status: Inpatient   Certification Statement: The patient will continue to require additional inpatient hospital stay due to medication management  Discharge Plan: Anticipate discharge tomorrow to home.    Code Status: Level 1 - Full Code    Subjective   Patient seen at bedside this morning. She states that she is feeling a lot better and has improvements in her shortness of breath. She asks that her  Atovaquone be sent to [facility] while her other medications go to her [facility], as ID mentioned that the Atovaquone is not always at other pharmacies but it would be here at [facility]. She feels that she will be ready to go home tomorrow, as she has a friend who plans to stay with her after discharge who cannot get there today. She also mentioned that occupational therapy saw her and cancelled the PT eval since she is doing so well, so she should be good to go home with no needs, other than potentially oxygen.     Objective :  Temp:  [96.8 °F (36 °C)-97.2 °F (36.2 °C)] 97.2 °F (36.2 °C)  HR:  [73-87] 81  BP: (137-147)/(72-87) 147/87  Resp:  [15-18] 18  SpO2:  [92 %-93 %] 92 %  O2 Device: Nasal cannula  Nasal Cannula O2 Flow Rate (L/min):  [3 L/min] 3 L/min    Body mass index is 20.85 kg/m².     Input and Output Summary (last 24 hours):     Intake/Output Summary (Last 24 hours) at 12/5/2024 0959  Last data filed at 12/5/2024 0826  Gross per 24 hour   Intake 480 ml   Output --   Net 480 ml       Physical Exam  Constitutional:       General: She is not in acute distress.     Appearance: Normal appearance. She is not ill-appearing.   HENT:      Head: Normocephalic and atraumatic.      Right Ear: External ear normal.      Left Ear: External ear normal.      Nose: Nose normal.      Comments: Nasal cannula in place      Mouth/Throat:      Mouth: Mucous membranes are moist.      Pharynx: Oropharynx is clear.   Eyes:      Conjunctiva/sclera: Conjunctivae normal.   Cardiovascular:      Rate and Rhythm: Normal rate and regular rhythm.      Pulses: Normal pulses.      Heart sounds: Normal heart sounds.   Pulmonary:      Effort: Pulmonary effort is normal.      Breath sounds: Normal breath sounds.      Comments: Patient getting 1 L nasal cannula saturating at 94%  Abdominal:      General: Abdomen is flat. Bowel sounds are normal. There is no distension.      Palpations: Abdomen is soft.      Tenderness: There is no  abdominal tenderness.   Skin:     General: Skin is warm and dry.      Capillary Refill: Capillary refill takes less than 2 seconds.   Neurological:      General: No focal deficit present.      Mental Status: She is alert and oriented to person, place, and time.   Psychiatric:         Mood and Affect: Mood normal.         Behavior: Behavior normal.         Lab Results: I have reviewed the following results:   Results from last 7 days   Lab Units 12/05/24  0638 12/04/24  0514   WBC Thousand/uL 6.85 6.49   HEMOGLOBIN g/dL 7.7* 8.9*   HEMATOCRIT % 24.3* 28.7*   PLATELETS Thousands/uL 332 400*   BANDS PCT %  --  2   LYMPHO PCT %  --  1*   MONO PCT %  --  3*   EOS PCT %  --  0     Results from last 7 days   Lab Units 12/05/24  0638   SODIUM mmol/L 142   POTASSIUM mmol/L 3.7   CHLORIDE mmol/L 106   CO2 mmol/L 29   BUN mg/dL 21   CREATININE mg/dL 0.72   ANION GAP mmol/L 7   CALCIUM mg/dL 9.1   ALBUMIN g/dL 3.0*   TOTAL BILIRUBIN mg/dL 0.21   ALK PHOS U/L 36   ALT U/L 11   AST U/L 12*   GLUCOSE RANDOM mg/dL 75                 Results from last 7 days   Lab Units 12/02/24  0329 12/01/24  0357 11/29/24  0436   PROCALCITONIN ng/ml 0.25 0.18 0.21       Recent Cultures (last 7 days):   Results from last 7 days   Lab Units 12/01/24  1657 11/29/24  1612   BLOOD CULTURE  No Growth at 72 hrs.  No Growth at 72 hrs.  --    GRAM STAIN RESULT   --  1+ Polys  No bacteria seen       Imaging Results Review: No pertinent imaging studies reviewed.  Other Study Results Review: No additional pertinent studies reviewed.    Last 24 Hours Medication List:     Current Facility-Administered Medications:     aluminum-magnesium hydroxide-simethicone (MAALOX) oral suspension 30 mL, Q6H PRN    atovaquone (MEPRON) oral suspension 750 mg, BID With Meals    benzonatate (TESSALON PERLES) capsule 200 mg, TID PRN    cyanocobalamin (VITAMIN B-12) tablet 1,000 mcg, Daily    folic acid (FOLVITE) tablet 1 mg, Daily    gabapentin (NEURONTIN) capsule 100 mg, BID  before breakfast/lunch    gabapentin (NEURONTIN) capsule 300 mg, HS    ibuprofen (MOTRIN) tablet 400 mg, Q6H PRN    lactobacillus acidophilus-bulgaricus (FLORANEX) packet 1 packet, Daily    levETIRAcetam (KEPPRA) tablet 1,000 mg, Q12H KRISS    levothyroxine tablet 50 mcg, Early Morning    LORazepam (ATIVAN) injection 1 mg, Q8H PRN    LORazepam (ATIVAN) injection 2 mg, Q8H PRN    ondansetron (ZOFRAN) injection 4 mg, Q6H PRN    pantoprazole (PROTONIX) EC tablet 40 mg, Early Morning    predniSONE tablet 40 mg, BID With Meals **FOLLOWED BY** [START ON 12/8/2024] predniSONE tablet 60 mg, Daily **FOLLOWED BY** [START ON 12/13/2024] predniSONE tablet 40 mg, Daily **FOLLOWED BY** [START ON 12/18/2024] predniSONE tablet 20 mg, Daily    rivaroxaban (XARELTO) tablet 15 mg, BID With Meals    senna (SENOKOT) tablet 8.6 mg, HS    vancomycin (VANCOCIN) 1,250 mg in sodium chloride 0.9 % 250 mL IVPB, Q24H, Last Rate: 1,250 mg (12/04/24 0909)    Facility-Administered Medications Ordered in Other Encounters:     fentaNYL injection, PRN    midazolam (VERSED) injection, PRN    Administrative Statements   Today, Patient Was Seen By: Fabby Monzon, DO  I have spent a total time of 60 minutes in caring for this patient on the day of the visit/encounter including Diagnostic results, Prognosis, Risks and benefits of tx options, Instructions for management, Patient and family education, Importance of tx compliance, Risk factor reductions, Impressions, Counseling / Coordination of care, Documenting in the medical record, Reviewing / ordering tests, medicine, procedures  , Obtaining or reviewing history  , and Communicating with other healthcare professionals .    **Please Note: This note may have been constructed using a voice recognition system.**

## 2024-12-05 NOTE — ASSESSMENT & PLAN NOTE
Worsening right upper lobe infiltrates could represent atypical PCP presentation, radiation pneumonitis or Keytruda toxicity   CXR 11/29/2024 with increased haziness of right upper lung compared to admit CXR, CT chest on admit showed right upper lobe consolidations that appear to be consistent with pneumonia  Persistent fevers on Rocephin, s/p bronchoscopy 11/29 with lymphocyte predominant BAL and cultures with no growth to date, pneumocystis PCR positive   Wean oxygen for goal sats >90%  Will treat for PCP with Atovaquone for 21 days, discussed with Oncologist and will taper Prednisone 40 mg BID for 5 days then 60 mg for 5 days then 40 mg for 5 days then 20 mg until follow up, will need Bactrim MWF for PCP ppx once off Atovaquone  Pulmonary will sign off at this time and will follow-up as outpatient with repeat imaging

## 2024-12-05 NOTE — ASSESSMENT & PLAN NOTE
Noted initially on admit CXR and CT chest, MRSA swab negative  Has been on Rocephin with continued fevers and CXR on 11/29 with worsening RUL infiltrates  Bronchoscopy 11/29 with cultures showing no growth  CRP 22.7 on 11/11, CRP peaked at 175 now improved to 75  Fevers resolved after escalating to Vanco/cefepime but BAL showed lymphocyte predominant fluid and no organisms identified on Gram stain

## 2024-12-05 NOTE — ASSESSMENT & PLAN NOTE
"  Recent Labs     12/03/24  0559 12/04/24  0514 12/05/24  0638   WBC 3.95* 6.49 6.85     Recent Labs     12/03/24  0559 12/04/24  0514 12/05/24  0638   CREATININE 0.75 0.82 0.72   EGFR 78 70 82     Estimated Creatinine Clearance: 54.2 mL/min (by C-G formula based on SCr of 0.72 mg/dL).  No results for input(s): \"LACTICACID\" in the last 72 hours.    No results for input(s): \"PROCALCITONI\" in the last 72 hours.      Vitals:    12/05/24 0730   BP: 147/87   Pulse: 81   Resp: 18   Temp:    SpO2: 92%     Temperature: (!) 97.2 °F (36.2 °C)  Temp Source: Oral        No results for input(s): \"BLOODCX\", \"SPUTUMCULTUR\", \"GRAMSTAIN\", \"URINECX\", \"WOUNDCULT\", \"BODYFLUIDCUL\", \"MRSACULTURE\", \"INFLUAPCR\", \"INFLUBPCR\", \"RSVPCR\", \"LEGIONELLAUR\", \"STPU\", \"CDIFFTOXINB\" in the last 72 hours.      CT pe study w abdomen pelvis w contrast  Result Date: 11/25/2024  Right upper lobe opacities consistent with pneumonia 4.  Mild pulmonary edema       DEFINITIONS   SIRS: two of the following that cannot be better explained by another cause  HR:>90/min  and WBC: <4x109/L (<4000/mm3), >12x109/L (>12,000/mm3), or =10% bands  SEPSIS  SIRS AND  Confirmed OR Suspected infection Lungs  qSOFA=0  Blood cultures negative at 72 hours  Procal continues to be negative  MRSA swab negative, vanc stopped 11/27      PLAN:  LABS:   Trend CBC and fever as markers of ongoing infection  Pneumococcal Antibody 23 ordered, to be followed up outpatient. Ordered prevnar 20 vaccine her for which was administered on 11/27.  Bronchoscopy results so far: Bronchoalveolar lavage: 2% neutrophils. 58% lymphs, 40% macrophage, total count 100, bronchial gram stain 1+ polys, no bacteria seen. AFB culture with stain negative for acid fast bacilli. Fungal cx no growth, virus cx negative at 24 hours, positive for pneumocystis PCR    REASSESS DAILY: Reassess pt response to txt daily. Document improvement or worsening status. Patient had oxygen needs starting on 11/28. Currently " on 1 L nasal cannula, an improvement from yesterday where she was on 2 L. Home O2 eval ordered for discharge planning.  Given tessalon perles PRN for cough.   Repeated chest x-ray 11/29 which showed worsening right upper lobe pneumonia. Consulted pulmonology and had bronchoscopy 11/29. Pending tests: legionella culture, aspergillus antigen, Fungitell  PT/OT ordered for discharge recommendations, no rehab needs on discharge    Treatment medications  Levoquin 750mg IV q48hrs (renal dose) Given one dose 24NOV24, stopped and switched to ceftriaxone 1,000 mg Q24Hr on 11/25. Stopped 11/29.  Vanc 750mg IV q12hrs Started 23:00 24NOV24. Stopped on 11/27 due to negative MRSA cx, restarted due to lack of improvement. Will finish last dose 12/5 AM.   Current regimen per pulm and ID: Finish last dose of Vanc tomorrow, 12/5.  Atovaquone 750 mg BID for 21 days started 12/3, then switch to Bactrim 1 double strength tablet MFW as prophylaxis with steroid use. Prednisone 40 mg BID start date 12/3. Plan will be this dose for 5 days, then decreased to 60 mg daily, for 5 more days, then 40 mg daily for 5 days, then switch to 20 mg daily until outpatient follow up with medical oncology. Pulm follow up outpatient as well after discharge.   Patient can potentially leave tomorrow after vanc if comfortable    NUTRITION:   Good nutrition and tight glucose management   No current benefit of IV Vit C

## 2024-12-05 NOTE — PLAN OF CARE
Problem: Potential for Falls  Goal: Patient will remain free of falls  Description: INTERVENTIONS:  - Educate patient/family on patient safety including physical limitations  - Instruct patient to call for assistance with activity   - Consult OT/PT to assist with strengthening/mobility   - Keep Call bell within reach  - Keep bed low and locked with side rails adjusted as appropriate  - Keep care items and personal belongings within reach  - Initiate and maintain comfort rounds  - Make Fall Risk Sign visible to staff  - Offer Toileting every 2 Hours, in advance of need  - Initiate/Maintain bed/chair alarm  - Obtain necessary fall risk management equipment: yellow bracelet/socks  - Apply yellow socks and bracelet for high fall risk patients  - Consider moving patient to room near nurses station  Outcome: Progressing     Problem: INFECTION - ADULT  Goal: Absence or prevention of progression during hospitalization  Description: INTERVENTIONS:  - Assess and monitor for signs and symptoms of infection  - Monitor lab/diagnostic results  - Monitor all insertion sites, i.e. indwelling lines, tubes, and drains  - Monitor endotracheal if appropriate and nasal secretions for changes in amount and color  - [place] appropriate cooling/warming therapies per order  - Administer medications as ordered  - Instruct and encourage patient and family to use good hand hygiene technique  - Identify and instruct in appropriate isolation precautions for identified infection/condition  Outcome: Progressing     Problem: SAFETY ADULT  Goal: Patient will remain free of falls  Description: INTERVENTIONS:  - Educate patient/family on patient safety including physical limitations  - Instruct patient to call for assistance with activity   - Consult OT/PT to assist with strengthening/mobility   - Keep Call bell within reach  - Keep bed low and locked with side rails adjusted as appropriate  - Keep care items and personal belongings within  reach  - Initiate and maintain comfort rounds  - Make Fall Risk Sign visible to staff  - Offer Toileting every 2 Hours, in advance of need  - Initiate/Maintain bed/chair alarm  - Obtain necessary fall risk management equipment: yellow bracelet/socks  - Apply yellow socks and bracelet for high fall risk patients  - Consider moving patient to room near nurses station  Outcome: Progressing     Problem: Knowledge Deficit  Goal: Patient/family/caregiver demonstrates understanding of disease process, treatment plan, medications, and discharge instructions  Description: Complete learning assessment and assess knowledge base.  Interventions:  - Provide teaching at level of understanding  - Provide teaching via preferred learning methods  Outcome: Progressing     Problem: RESPIRATORY - ADULT  Goal: Achieves optimal ventilation and oxygenation  Description: INTERVENTIONS:  - Assess for changes in respiratory status  - Assess for changes in mentation and behavior  - Position to facilitate oxygenation and minimize respiratory effort  - Oxygen administered by appropriate delivery if ordered  - Initiate smoking cessation education as indicated  - Encourage broncho-pulmonary hygiene including cough, deep breathe, Incentive Spirometry  - Assess the need for suctioning and aspirate as needed  - Assess and instruct to report SOB or any respiratory difficulty  - Respiratory Therapy support as indicated  Outcome: Progressing     Problem: HEMATOLOGIC - ADULT  Goal: Maintains hematologic stability  Description: INTERVENTIONS  - Assess for signs and symptoms of bleeding or hemorrhage  - Monitor labs  - Administer supportive blood products/factors as ordered and appropriate  Outcome: Progressing

## 2024-12-05 NOTE — ASSESSMENT & PLAN NOTE
Patient requiring oxygen 1 L via nasal cannula currently. Oxygen needs likely due to the setting of pneumonia and pulmonary embolism. Also had recent bronchoscopy which can increase oxygen needs initially afterwards.   -patient will likely need oxygen on discharge for a few weeks as her lungs recover. Will order home O2 eval and set this up with case management on the day she leaves.

## 2024-12-05 NOTE — ASSESSMENT & PLAN NOTE
Blood Pressure: 147/87  Recent Labs     12/03/24  1824 12/04/24  0514 12/05/24  0638   HGB 8.5* 8.9* 7.7*   BL 8-11    Plan:  -monitor, on Xarelto.   - on AM of 11/29, patient had hemoglobin of 6.6. no coughing up blood, or blood in stool or urine. Only slight bleeding with blowing her nose that she notes. Prepared and gave 1 unit of leukoreduced PRBCs. Repeat H&H afterwards was 9.2.  Transfuse if Hb < 7

## 2024-12-06 VITALS
BODY MASS INDEX: 20.98 KG/M2 | HEIGHT: 62 IN | DIASTOLIC BLOOD PRESSURE: 86 MMHG | HEART RATE: 74 BPM | RESPIRATION RATE: 18 BRPM | OXYGEN SATURATION: 91 % | WEIGHT: 114 LBS | SYSTOLIC BLOOD PRESSURE: 144 MMHG | TEMPERATURE: 97.3 F

## 2024-12-06 PROBLEM — A41.9 SEPSIS, UNSPECIFIED ORGANISM (HCC): Status: RESOLVED | Noted: 2024-11-26 | Resolved: 2024-12-06

## 2024-12-06 LAB
ALBUMIN SERPL BCG-MCNC: 3.1 G/DL (ref 3.5–5)
ALP SERPL-CCNC: 37 U/L (ref 34–104)
ALT SERPL W P-5'-P-CCNC: 11 U/L (ref 7–52)
ANION GAP SERPL CALCULATED.3IONS-SCNC: 10 MMOL/L (ref 4–13)
AST SERPL W P-5'-P-CCNC: 11 U/L (ref 13–39)
BACTERIA BLD CULT: NORMAL
BACTERIA BLD CULT: NORMAL
BASOPHILS # BLD MANUAL: 0 THOUSAND/UL (ref 0–0.1)
BASOPHILS NFR MAR MANUAL: 0 % (ref 0–1)
BILIRUB SERPL-MCNC: 0.24 MG/DL (ref 0.2–1)
BUN SERPL-MCNC: 24 MG/DL (ref 5–25)
CALCIUM ALBUM COR SERPL-MCNC: 9.7 MG/DL (ref 8.3–10.1)
CALCIUM SERPL-MCNC: 9 MG/DL (ref 8.4–10.2)
CHLORIDE SERPL-SCNC: 105 MMOL/L (ref 96–108)
CO2 SERPL-SCNC: 27 MMOL/L (ref 21–32)
CREAT SERPL-MCNC: 0.75 MG/DL (ref 0.6–1.3)
CRP SERPL QL: 45.9 MG/L
EOSINOPHIL # BLD MANUAL: 0 THOUSAND/UL (ref 0–0.4)
EOSINOPHIL NFR BLD MANUAL: 0 % (ref 0–6)
ERYTHROCYTE [DISTWIDTH] IN BLOOD BY AUTOMATED COUNT: 15.1 % (ref 11.6–15.1)
FUNGITELL VALUE: ABNORMAL PG/ML
GFR SERPL CREATININE-BSD FRML MDRD: 78 ML/MIN/1.73SQ M
GLUCOSE SERPL-MCNC: 94 MG/DL (ref 65–140)
HCT VFR BLD AUTO: 25.2 % (ref 34.8–46.1)
HGB BLD-MCNC: 7.8 G/DL (ref 11.5–15.4)
INTERPRETATION: ABNORMAL
JCPYV AB SERPL QL IA: POSITIVE
LYMPHOCYTES # BLD AUTO: 0.08 THOUSAND/UL (ref 0.6–4.47)
LYMPHOCYTES # BLD AUTO: 1 % (ref 14–44)
Lab: ABNORMAL
MCH RBC QN AUTO: 30.8 PG (ref 26.8–34.3)
MCHC RBC AUTO-ENTMCNC: 31 G/DL (ref 31.4–37.4)
MCV RBC AUTO: 100 FL (ref 82–98)
METAMYELOCYTE ABSOLUTE CT: 0.16 THOUSAND/UL (ref 0–0.1)
METAMYELOCYTES NFR BLD MANUAL: 2 % (ref 0–1)
MONOCYTES # BLD AUTO: 0.55 THOUSAND/UL (ref 0–1.22)
MONOCYTES NFR BLD: 7 % (ref 4–12)
MYELOCYTE ABSOLUTE CT: 0.16 THOUSAND/UL (ref 0–0.1)
MYELOCYTES NFR BLD MANUAL: 2 % (ref 0–1)
NEUTROPHILS # BLD MANUAL: 6.97 THOUSAND/UL (ref 1.85–7.62)
NEUTS BAND NFR BLD MANUAL: 3 % (ref 0–8)
NEUTS SEG NFR BLD AUTO: 85 % (ref 43–75)
PLATELET # BLD AUTO: 371 THOUSANDS/UL (ref 149–390)
PLATELET BLD QL SMEAR: ADEQUATE
PMV BLD AUTO: 9 FL (ref 8.9–12.7)
POLYCHROMASIA BLD QL SMEAR: PRESENT
POTASSIUM SERPL-SCNC: 3.8 MMOL/L (ref 3.5–5.3)
PROT SERPL-MCNC: 6.1 G/DL (ref 6.4–8.4)
RBC # BLD AUTO: 2.53 MILLION/UL (ref 3.81–5.12)
RBC MORPH BLD: PRESENT
REFERENCE VALUE: ABNORMAL
SODIUM SERPL-SCNC: 142 MMOL/L (ref 135–147)
WBC # BLD AUTO: 7.92 THOUSAND/UL (ref 4.31–10.16)

## 2024-12-06 PROCEDURE — 85007 BL SMEAR W/DIFF WBC COUNT: CPT

## 2024-12-06 PROCEDURE — 94761 N-INVAS EAR/PLS OXIMETRY MLT: CPT

## 2024-12-06 PROCEDURE — 86140 C-REACTIVE PROTEIN: CPT

## 2024-12-06 PROCEDURE — 99233 SBSQ HOSP IP/OBS HIGH 50: CPT | Performed by: INTERNAL MEDICINE

## 2024-12-06 PROCEDURE — 85027 COMPLETE CBC AUTOMATED: CPT

## 2024-12-06 PROCEDURE — 80053 COMPREHEN METABOLIC PANEL: CPT

## 2024-12-06 RX ORDER — ACETAMINOPHEN 325 MG/1
650 TABLET ORAL EVERY 6 HOURS SCHEDULED
Qty: 30 TABLET | Refills: 0 | Status: SHIPPED | OUTPATIENT
Start: 2024-12-06 | End: 2024-12-11

## 2024-12-06 RX ORDER — SULFAMETHOXAZOLE AND TRIMETHOPRIM 800; 160 MG/1; MG/1
1 TABLET ORAL DAILY
Qty: 12 TABLET | Refills: 0 | Status: SHIPPED | OUTPATIENT
Start: 2024-12-24 | End: 2024-12-10 | Stop reason: SDUPTHER

## 2024-12-06 RX ORDER — PREDNISONE 20 MG/1
TABLET ORAL
Qty: 42 TABLET | Refills: 0 | Status: SHIPPED | OUTPATIENT
Start: 2024-12-06 | End: 2024-12-24

## 2024-12-06 RX ADMIN — LEVOTHYROXINE SODIUM 50 MCG: 0.05 TABLET ORAL at 05:10

## 2024-12-06 RX ADMIN — LEVETIRACETAM 1000 MG: 250 TABLET, FILM COATED ORAL at 09:51

## 2024-12-06 RX ADMIN — Medication 1 PACKET: at 09:59

## 2024-12-06 RX ADMIN — FOLIC ACID 1 MG: 1 TABLET ORAL at 09:51

## 2024-12-06 RX ADMIN — PANTOPRAZOLE SODIUM 40 MG: 40 TABLET, DELAYED RELEASE ORAL at 05:10

## 2024-12-06 RX ADMIN — RIVAROXABAN 15 MG: 15 TABLET, FILM COATED ORAL at 09:59

## 2024-12-06 RX ADMIN — PREDNISONE 40 MG: 20 TABLET ORAL at 09:59

## 2024-12-06 RX ADMIN — GABAPENTIN 100 MG: 100 CAPSULE ORAL at 05:10

## 2024-12-06 RX ADMIN — ATOVAQUONE 750 MG: 750 SUSPENSION ORAL at 09:51

## 2024-12-06 RX ADMIN — CYANOCOBALAMIN TAB 500 MCG 1000 MCG: 500 TAB at 09:51

## 2024-12-06 NOTE — ASSESSMENT & PLAN NOTE
Patient reached sepsis criteria AM of 11/26 due to fevers, elevated wbc count, and HR >90. LA ordered and normal. Procal continues to be normal. Will continue to monitor with antibiotics for penumonia, and heparin drip for PE. Will continue to get daily labs.   AM of 11/29: patient no longer septic due to no fevers in the past 24 hours, but still having mild temperature elevations and Hrs in the 90s. Will monitor.   12/6: no more fevers, Hrs 80s-90s, continues to need oxygen via nasal cannula, 1 L.

## 2024-12-06 NOTE — PLAN OF CARE
Problem: RESPIRATORY - ADULT  Goal: Achieves optimal ventilation and oxygenation  Description: INTERVENTIONS:  - Assess for changes in respiratory status  - Assess for changes in mentation and behavior  - Position to facilitate oxygenation and minimize respiratory effort  - Oxygen administered by appropriate delivery if ordered  - Initiate smoking cessation education as indicated  - Encourage broncho-pulmonary hygiene including cough, deep breathe, Incentive Spirometry  - Assess the need for suctioning and aspirate as needed  - Assess and instruct to report SOB or any respiratory difficulty  - Respiratory Therapy support as indicated  Outcome: Progressing     Problem: HEMATOLOGIC - ADULT  Goal: Maintains hematologic stability  Description: INTERVENTIONS  - Assess for signs and symptoms of bleeding or hemorrhage  - Monitor labs  - Administer supportive blood products/factors as ordered and appropriate  Outcome: Progressing     Problem: Knowledge Deficit  Goal: Patient/family/caregiver demonstrates understanding of disease process, treatment plan, medications, and discharge instructions  Description: Complete learning assessment and assess knowledge base.  Interventions:  - Provide teaching at level of understanding  - Provide teaching via preferred learning methods  Outcome: Progressing

## 2024-12-06 NOTE — PLAN OF CARE
Problem: Potential for Falls  Goal: Patient will remain free of falls  Description: INTERVENTIONS:  - Educate patient/family on patient safety including physical limitations  - Instruct patient to call for assistance with activity   - Consult OT/PT to assist with strengthening/mobility   - Keep Call bell within reach  - Keep bed low and locked with side rails adjusted as appropriate  - Keep care items and personal belongings within reach  - Initiate and maintain comfort rounds  - Make Fall Risk Sign visible to staff  - Offer Toileting every  Hours, in advance of need  - Initiate/Maintain alarm  - Obtain necessary fall risk management equipment:  - Apply yellow socks and bracelet for high fall risk patients  - Consider moving patient to room near nurses station  Outcome: Progressing     Problem: INFECTION - ADULT  Goal: Absence or prevention of progression during hospitalization  Description: INTERVENTIONS:  - Assess and monitor for signs and symptoms of infection  - Monitor lab/diagnostic results  - Monitor all insertion sites, i.e. indwelling lines, tubes, and drains  - Monitor endotracheal if appropriate and nasal secretions for changes in amount and color  - [place] appropriate cooling/warming therapies per order  - Administer medications as ordered  - Instruct and encourage patient and family to use good hand hygiene technique  - Identify and instruct in appropriate isolation precautions for identified infection/condition  Outcome: Progressing

## 2024-12-06 NOTE — ASSESSMENT & PLAN NOTE
"  Recent Labs     12/04/24  0514 12/05/24  0638 12/06/24  0506   WBC 6.49 6.85 7.92     Recent Labs     12/04/24  0514 12/05/24  0638   CREATININE 0.82 0.72   EGFR 70 82     Estimated Creatinine Clearance: 54.2 mL/min (by C-G formula based on SCr of 0.72 mg/dL).  No results for input(s): \"LACTICACID\" in the last 72 hours.    No results for input(s): \"PROCALCITONI\" in the last 72 hours.      Vitals:    12/06/24 0034   BP: 164/90   Pulse: 65   Resp:    Temp:    SpO2: 94%     Temperature: (!) 97.3 °F (36.3 °C)  Temp Source: Oral        No results for input(s): \"BLOODCX\", \"SPUTUMCULTUR\", \"GRAMSTAIN\", \"URINECX\", \"WOUNDCULT\", \"BODYFLUIDCUL\", \"MRSACULTURE\", \"INFLUAPCR\", \"INFLUBPCR\", \"RSVPCR\", \"LEGIONELLAUR\", \"STPU\", \"CDIFFTOXINB\" in the last 72 hours.      CT pe study w abdomen pelvis w contrast  Result Date: 11/25/2024  Right upper lobe opacities consistent with pneumonia 4.  Mild pulmonary edema       DEFINITIONS   SIRS: two of the following that cannot be better explained by another cause  HR:>90/min  and WBC: <4x109/L (<4000/mm3), >12x109/L (>12,000/mm3), or =10% bands  SEPSIS  SIRS AND  Confirmed OR Suspected infection Lungs  qSOFA=0  Blood cultures negative at 72 hours  Procal continues to be negative  MRSA swab negative      PLAN:  LABS:   Trend CBC and fever as markers of ongoing infection  Pneumococcal Antibody 23 ordered, to be followed up outpatient. Ordered prevnar 20 vaccine her for which was administered on 11/27.  Bronchoscopy results so far: Bronchoalveolar lavage: 2% neutrophils. 58% lymphs, 40% macrophage, total count 100, bronchial gram stain 1+ polys, no bacteria seen. AFB culture with stain negative for acid fast bacilli. Fungal cx no growth, virus cx negative at 24 hours, positive for pneumocystis PCR    REASSESS DAILY: Reassess pt response to txt daily. Document improvement or worsening status. Patient had oxygen needs starting on 11/28. Currently on 1 L nasal cannula, an improvement from " yesterday where she was on 2 L. Home O2 eval ordered for discharge planning.  Given tessalon perles PRN for cough.   Repeated chest x-ray 11/29 which showed worsening right upper lobe pneumonia. Consulted pulmonology and had bronchoscopy 11/29. Pending tests: legionella culture, aspergillus antigen, Fungitell  PT/OT ordered for discharge recommendations, no rehab needs on discharge    Treatment medications  Levoquin 750mg IV q48hrs (renal dose) Given one dose 24NOV24, stopped and switched to ceftriaxone 1,000 mg Q24Hr on 11/25. Stopped 11/29.  Vanc 750mg IV q12hrs Started 23:00 24NOV24. Stopped on 11/27 due to negative MRSA cx, restarted due to lack of improvement. Will finish last dose 12/5 AM. Completed.   Current regimen per pulm and ID: Atovaquone 750 mg BID for 21 days started 12/3, then switch to Bactrim 1 double strength tablet MFW as prophylaxis with steroid use. Prednisone 40 mg BID start date 12/3. Plan will be this dose for 5 days, then decreased to 60 mg daily, for 5 more days, then 40 mg daily for 5 days, then switch to 20 mg daily until outpatient follow up with medical oncology. Pulm follow up outpatient as well after discharge.   Patient can be discharged today with this plan    NUTRITION:   Good nutrition and tight glucose management   No current benefit of IV Vit C

## 2024-12-06 NOTE — PROGRESS NOTES
HEMATOLOGY / ONCOLOGY CLINIC FOLLOW UP NOTE    Patient Ayla Nye  MRN: [id number]  : 1950  Date of Encounter 12/10/2024            Reason for Encounter: hospital follow up, pathology follow up for pelvic mass biopsy     C1 2024  C2  2024  C3 2024  C4 2024  C5 2024  C6 2024 at [place] f/b hospitalization for neutropenic fever     First line metastatic treatment     Carboplatin AUC 5 Pemetrexed 500 mg/m2 and Pembrolizumab 200 mg IV every 3 weeks x 4 cycles     C1 held due to hospital admission  C1 held due to radiation and concurrent high-dose steroid use     C1 now 10/7/2024  C2 10/28/2024-held due to admission     C2 will be 2024- completed         Admission -2024 PCP pneumonia, PE now on Xarelto         Oncology History Overview Note   Patient has history of pM9NO3T2 (IIIB) NSCLC of the right upper lobe, s/p concurrent chemoRT completing on 24. She completed a course of SRS on 24 after MRI brain demonstrated five supra- and infratentorial enhancing lesions compatible with metastases. Currently receiving a course of palliative radiation therapy to right pelvic mass. She returns today for 2 month follow-up with repeat MRI.    10/8/24 MRI Brain BT w wo Contrast  IMPRESSION:  Mixed treatment response since MRI brain 9/10/2024  - Slightly increase size of left temporal lobe metastatic lesion.  - Unchanged left occipital lobe metastatic lesion with evidence of radiation necrosis, left paramedian parietal lobe lesion, and tiny left anterolateral lobe frontal lobe lesion.  - Unchanged enhancement in bilateral cranial nerve VII and both internal auditory canals, suspicious for leptomeningeal metastasis.  - Decreased size of left paramedian frontal lobe lesion and tiny cerebellar vermis lesion.  - No new enhancing intracranial metastasis.  Persistent diffuse perilesional vasogenic edema in left parietal lobe and left occipital lobe with mild mass  effect on posterior aspect of left lateral ventricle.    Upcoming:       Malignant neoplasm of upper lobe, right bronchus or lung (HCC)   2024 Initial Diagnosis    Primary lung adenocarcinoma, right (HCC)     3/26/2024 Biopsy    A-C. Lymph Node, Level 4R :    - Metastatic non-small cell carcinoma, most compatible with a primary lung adenocarcinoma; see note.       D-F. Lymph Node, Level 10R:    - Metastatic non-small cell carcinoma; see note.       G-I. Lymph Node, Level 4L:    - Metastatic non-small cell carcinoma; see note.       4/15/2024 -  Cancer Staged    Staging form: Lung, AJCC 8th Edition  - Clinical stage from 4/15/2024: Stage IIIB (cT2b, cN3, cM0) - Signed by Rogelio Beebe DO on 4/15/2024       5/23/2024 - 7/2/2024 Chemotherapy    alteplase (CATHFLO), 2 mg, Intracatheter, Every 1 Minute as needed, 6 of 6 cycles  CARBOplatin (PARAPLATIN) IVPB (GOG AUC DOSING), 130.4 mg, Intravenous, Once, 6 of 6 cycles  Administration: 130.4 mg (5/23/2024), 144.8 mg (5/30/2024), 138.4 mg (6/6/2024), 144.8 mg (6/13/2024), 132 mg (6/21/2024), 143.6 mg (7/2/2024)  PACLItaxel (TAXOL) chemo IVPB, 45 mg/m2 = 67.2 mg (90 % of original dose 50 mg/m2), Intravenous, Once, 6 of 6 cycles  Dose modification: 45 mg/m2 (original dose 50 mg/m2, Cycle 1, Reason: Anticipated Tolerance)  Administration: 67.2 mg (5/23/2024), 67.2 mg (5/30/2024), 67.2 mg (6/6/2024), 67.2 mg (6/13/2024), 67.2 mg (6/21/2024), 67.2 mg (7/2/2024)     5/23/2024 - 7/5/2024 Radiation    Treatment:  Course: C1    Plan ID Energy Fractions Dose per Fraction (cGy) Dose Correction (cGy) Total Dose Delivered (cGy) Elapsed Days   R Lung_Hilum 6X 30 / 30 200 0 6,000 43      Treatment dates:  C1: 5/23/2024 - 7/5/2024 8/8/2024 - 8/8/2024 Radiation    SRS 5 PTVs   2000 cGy     9/12/2024 Biopsy    Mass, Superficial perianal mass:  - Squamous cell carcinoma.     Note: The patient's prior lung EBUS sampling shows the tumor to stain for TTF1 and Napsin with absent p40  expression.  The current perianal mass sampling shows diffuse p40 expression with absent TTF1 expression.  This may represent a primary anal/perianal squamous cell carcinoma versus a possible metastatic combined lung tumor (adenocarcinoma and previously unsampled squamous component).  Suggest clinical correlation and appropriate follow-up.         9/23/2024 -  Cancer Staged    Staging form: Lung, AJCC 8th Edition  - Clinical: Stage IV (cM1) - Signed by Honey Gamboa MD on 9/23/2024       10/7/2024 -  Chemotherapy    cyanocobalamin, 1,000 mcg, Intramuscular, Once, 1 of 3 cycles  Administration: 1,000 mcg (8/19/2024)  alteplase (CATHFLO), 2 mg, Intracatheter, Every 1 Minute as needed, 2 of 6 cycles  fosaprepitant (EMEND) IVPB, 150 mg, Intravenous, Once, 2 of 6 cycles  Administration: 150 mg (10/7/2024), 150 mg (11/14/2024)  CARBOplatin (PARAPLATIN) IVPB (GOG AUC DOSING), 347 mg, Intravenous, Once, 2 of 6 cycles  Administration: 347 mg (10/7/2024), 346 mg (11/14/2024)  pemetrexed (ALIMTA) chemo infusion, 735 mg, Intravenous, Once, 2 of 6 cycles  Administration: 700 mg (10/7/2024), 700 mg (11/14/2024)  pembrolizumab (KEYTRUDA) IVPB, 200 mg, Intravenous, Once, 2 of 6 cycles  Administration: 200 mg (11/14/2024), 200 mg (10/7/2024)     Metastatic cancer to brain (HCC)   7/29/2024 Initial Diagnosis    Metastatic cancer to brain (HCC)     8/8/2024 - 8/8/2024 Radiation    SRS 5 PTVs   2000 cGy     9/12/2024 Biopsy    Mass, Superficial perianal mass:  - Squamous cell carcinoma.     Note: The patient's prior lung EBUS sampling shows the tumor to stain for TTF1 and Napsin with absent p40 expression.  The current perianal mass sampling shows diffuse p40 expression with absent TTF1 expression.  This may represent a primary anal/perianal squamous cell carcinoma versus a possible metastatic combined lung tumor (adenocarcinoma and previously unsampled squamous component).  Suggest clinical correlation and appropriate follow-up.     "          Assessment/Plan:     Ms. 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  She has been tolerating Hydrea 500 mg once daily Monday through Friday and off on Saturday and Sunday. Patient also with 4.1 cm RUL mass 1.5cm spiculated posterior nodule mediastinal adenopathy, no metastatic disease including brain MRI new diagnosis      ET:      Previously treated with hydroxyurea (HYDREA) 500 mg capsule     Plts 498 > 434 > 388 (holding hydroxyurea since 5/23/2024 2/2 drop in platelets from chemotherapy)     Plts 166 (7/4/2024) s/p C6 chemo, continuing to hold hydroxyurea, recheck labs and will plan to restart once labs show thrombocytosis.     Does not need to be restarted on hydrea; labs from 8/1/2024 were 192      Plts 8/24/2024 were 227      NSCLC/adenocarcinoma; mutational analysis pending      Now with RUL 4.2 cm mass with mediastinal adenopathy at least cT2a cN2 cM0 lung cancer     cT2b N3 Stage IIIB lung      Please see above regarding discussion      Carboplatin AUC 2 Taxol 45 mg/m2 weekly with RT     Durvalumab adjuvant post treatment depending on response     Neuropathy has resolved after last cycle of Taxol.     Esophagitis appearing better after completing radiation.     7/1/2024     Was admitted 6/22-6/24/2024 for neutropenic fever     WBC 2.5 ANC 1810 Hgb 9.0 plts 240 (hx ET/off hydrea)     Continue with C6 7/2/2024; last radiation 7/5/2024       7/15/2024     Completed C6 on 7/2/2024 and last radiation 7/5/2024.  Was admitted for neutropenic fever after last cycle of chemo.  Discharged on 7/4/2024 and has been asymptomatic since.  Labs at discharge showed WBC 1.5, ANC 1317 and Plt 166.     Repeat CT PET 8/26/2024, radiology read from CT CAP in ED showed \"progression\" of disease but this study was performed during chemoradiation, so it is confounded by the inability to distinguish disease from inflammation.  Will  treatment " response on repeat CT PET.     Recheck labs to confirm neutropenia has resolved.     Hold Otezla for psoriasis until neutropenia has resolved, but would like to restart as soon as safe as psoriasis is rebounding.     8/16/2024     Was admitted for new onset brain mets 7/30/2024; on decadron taper down to 1 mg daily next week     S/p SRS 6 fractions completed 8 August 2024 at [place]     Had modest response to treatment per PET in terms of the lung     At issue clearly are the new brain lesions 2.0x.4 left occiput as well as multiple other smaller lesions as below     In addition PET with new SUV 9.9 mass in the right anorectal fat/right ischial tuberosity 2.7x2 which is painful to patient and feels is growing     Will get MRI pelvis; will probably need biopsy.  Less likely abscess and more likely disease but unusual place for a NSCLC to metastasize to     Will start Carboplatin/Pemetrexed/Pembrolizumab and had long discussion regarding this         9/17/2024     Biopsy periranal area as follows:         . Mass, Superficial perianal mass:  - Squamous cell carcinoma.     Note: The patient's prior lung EBUS sampling shows the tumor to stain for TTF1 and Napsin with absent p40 expression.  The current perianal mass sampling shows diffuse p40 expression with absent TTF1 expression.  This may represent a primary anal/perianal squamous cell carcinoma versus a possible metastatic combined lung tumor (adenocarcinoma and previously unsampled squamous component).  Suggest clinical correlation and appropriate follow-up.      Immunohistochemistry was performed on block A3. The tumor cells are positive for p40, AE1/3, CK7, GATA3; MOC-13 shows nonspecific staining and negative for CDX2, p16, CD45, CD31, synaptophysin, chromogranin, mucicarmine, p53 (wild-type), supporting the above diagnosis.  Napsin A is pending and results will be reported in an addendum.      Will have Rad Onc see patient as pain is unmanageable and will not use  narcotics     Is seeing Palliative Care     Will add celebrex 200 mg bid for now as taking extensive Tylenol with no pain relief     Was taking oxycodone, did not help and does not want dilaudid as lives alone      In terms of her metastatic disease, she was being treated for adenocarcinoma and this pathology is SCC; NSCLC is adenocarcinoma and thus this may be new primary lesion     May consider Carboplatin with taxane     Cannot start IO therapy as decadron dose remains above 20 mg prednisone equivalent and attempts at tapering have led to worsening neurological symptoms        9/23/2024     Pain improved with Celebrex/Tylenol      Did see Rad Onc; planning 15 fractions to right gluteal area with path c/w SCC     Markers for lung adenocarcinoma as was [facility]     This occurred also on Carboplatin/Taxol and CRT     Thus, will treat as planned with Carboplatin/Pemetrexed/Pembrolizumab     Decadron 2 mg bid; plan to 2 mg daily next week.     Can remain on that as equivalent to 10 mg prednisone     Plan for chemo at [facility] 7th Oct 2024      10/9/2024     Continues with rectal pain; was started on RT for SCC/new primary in right muscle-has had 6/15 fractions     Was on decadron taper to 2 mg daily which allows IO therapy to work as not effective at doses above 10 mg prednisone/2 mg decadron ; Rad Onc increased to 4 mg decadron for pain control but probably compromised efficacy of Pembrolizumab      Seeing Dr Neff tomorrow      Did tolerate C1 of Carboplatin/Pemetrexed/Pembrolizumab with no side effects        11/5/2024     Again admitted for HA/dizziness off decadron; discharged 11/4/2024; also found to be COVID positive, on paxlovid at present      Restarted 2 mg daily decadron as cannot tolerate taper     EEG done/ questionable seizure activity, on keppra     Scans repeated and with only 1 cycle of chemo which is not telling results as follows:     CT CAP 10/22/2024    1. New small bilateral pulmonary nodules, likely  metastases.     2. New right renal lesion(s) concerning for metastasis.     3. Significant interval enlargement of necrotic appearing mass in the right ischiorectal fossa, likely metastasis.     4. Decreasing right upper lobe necrotic mass with stable and/or decreasing satellite nodularity.     5. Improved mediastinal and right hilar adenopathy.     6. Stable 2.4 cm left upper lobe groundglass density.      MRI brain 10/21/2024       Redemonstration of intracranial metastatic lesions  2.3 cm enhancing lesion at the left occipital lobe is slightly increased in maximum dimension with increased central necrosis at the anterior margin that may be partly due to radiation necrosis.  Additional smaller metastases are stable or mildly decreased in size.  Stable left parieto-occipital edema with mass effect on the left lateral ventricle.  No new metastases. No acute infarct or hemorrhage     Will remain on decadron and will attempt C2 now on 11/14/2024    12/10/2024    Again admaitted 11/24-12/6/2024 with SOB/fevers.  Found to have PCP pneumonia    Atovaquone was started for 21 days, to complete 12/23/2024    Will then start Bactrim DS M/W/F    Again remains on prednisone, on current taper -60 mg bid x 5 days, then 40 mg x 5 days then 20 mg x 5 days then 10 mg daily-will be at 10 mg daily by 21 Dec 2024    On Xarelto for PE     Doing better, less SOB    No CNS symptoms at present    Will hold any treatment for now-cannot get IO therapy and unclear that she is a good candidate based on chronic immunosuppression from steroids    Will consider Carboplatin/Alimta later this month          ET     Plts off hydrea 220      Plts 102 11/4/2024    Plts 371 as of 12/6/2024       Follow up       30 Dec 2024       History of present illness:  Initial Visit 4/10/2024     Ayla Nye is a 73-year-old female with past medical history of hypertension, psoriatic arthritis, CKD stage III, hyperlipidemia, and latent TB.  She is seen in  follow-up for essential thrombocytosis.  She has had an elevated platelet count dating back to January 2028 all of her other cell lines were within normal limits.  Her highest platelet count was around 700,000.  Myeloproliferative work-up demonstrated positive JAK2 mutation and she was started on 500 mg of Hydrea daily in July 2020.  Due to leukopenia her dose was reduced to Monday, Wednesday, Friday.  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  Then again in July we had to decrease her dose and she is taking 500 mg once daily Monday through Friday and not taking any on Saturday or Sunday.     She has been tolerating the 500 mg of Hydrea Monday through Friday off Saturday and Sunday well without any side effects.  She was seen by my attending and underwent further workup of her recurrent infections and was found to have a mildly low IgG level.   She has had pneumonia twice once in October and then again in February.  She states that she has had multiple imaging of her chest which does suggest scarring which prompted a CT scan of her chest .  .  She is also had recurrent sinus infections.  She is a former smoker and quit 15 years ago bit was 1[[d x more than 30 years.        She does not have any continued symptoms of pneumonia and no cough, shortness of breath, or fevers.       She underwent the following testing; EBUS with pulmonary as well as CT chest/PET scan     Lung mass on CT chest 2/28/2024     LUNGS:     -4.1 x 3.4 cm solid mass in the anterior right upper lobe (series 302, image 73).     -Posterior to this mass, there is a 1.5 x 1.5 cm spiculated nodule (series 302, image 78)     -2.5 x 2.1 cm groundglass nodule in the anterior left upper lobe (series 302, image 90)     Mild emphysema.     Right apical pleural-parenchymal  scarring.     PLEURA: Small calcified left posterior pleural plaque, which may be from prior asbestos exposure or the sequela of old inflammation.     HEART/GREAT VESSELS: Normal heart size. Mild-moderate coronary artery calcification. Mild to moderate mitral annular calcification. Trace pericardial effusion. No thoracic aortic aneurysm.     MEDIASTINUM AND SUDEEP: 1.6 x 2.0 cm right lower paratracheal/precarinal lymph node. 1.3 x 1.3 cm right lower paratracheal lymph node.        CT PET  3/28/2024     Intensely FDG avid right upper lobe mass, SUV max of 18. This abuts the anterior pleural margin. Central photopenia suggest necrosis. Mass measures on the order of 4.2 cm in size, stable previously 4.1 cm.     Subjacent 1.5 cm spiculated nodule posteriorly demonstrates minimal uptake, SUV max of 1.7.     Minimal FDG uptake in the left upper lobe groundglass nodule, SUV max of 2.2. This measured up to 2.5 cm in size on recent CT, appears grossly stable on low-dose CT.     A few FDG-avid mediastinal lymph nodes. Right anterior paratracheal lymph node demonstrates SUV max of 13. This measures up to 1.9 cm short axis image 85 series 3, may be slightly larger previously 1.6 cm.     A right perihilar lymph node demonstrates SUV max of 12. This measures on the order of 1.2 cm short axis image 89 series 3.  CT images: Scattered emphysematous changes of the lung fields. Mild to moderate coronary artery calcifications. Minimal left pleural calcification. Small calcified lymph nodes in the left perihilar region        3/26/2024     -C. Lymph Node, Level 4R (ThinPrep, smears and cell block preparations):    - Metastatic non-small cell carcinoma, most compatible with a primary lung adenocarcinoma; see note.    - Satisfactory for evaluation.     D-F. Lymph Node, Level 10R (ThinPrep, smears and cell block preparations):    - Metastatic non-small cell carcinoma; see note.    - Satisfactory for evaluation.     G-I. Lymph Node, Level 4L  (ThinPrep, smears and cell block preparations):    - Metastatic non-small cell carcinoma; see note.    - Satisfactory for evaluation.      7/3/2024    CT CHEST, ABDOMEN AND PELVIS WITH IV CONTRAST     INDICATION: hx of lung cancer, fever, cough. Fever (102.9 degrees Fahrenheit), cough, recently had chemotherapy.     COMPARISON: February 28, 2024     TECHNIQUE: CT examination of the chest, abdomen and pelvis was performed. Multiplanar 2D reformatted images were created from the source data.     This examination, like all CT scans performed in the [facility], was performed utilizing techniques to minimize radiation dose exposure, including the use of iterative reconstruction and automated exposure control. Radiation dose length   product (DLP) for this visit: 314 mGy-cm     IV Contrast: 100 mL of iohexol (OMNIPAQUE)  Enteric Contrast: Not administered.     FINDINGS:     CHEST     LUNGS: There is an approximately 4.6 x 4.8 x 3.3 cm mass within the anteromedial aspect of the right upper lobe of the lung. This mass abuts the anterior and anteromedial pleural surface and the right hand aspect of the upper mediastinum. The mass   demonstrates peripheral spiculation and areas of low density centrally, suggesting areas of necrosis. Just posterolateral to this mass there is a spiculated satellite lesion measuring approximately 1.6 cm. There is also a spiculated satellite lesion   anterior and inferior to the dominant mass, measuring approximately 1.4 cm and abutting the anterior pleural surface (series 302 image 91); this satellite mass appears new compared with February 28, 2024. An ill-defined groundglass opacity in the left   upper lobe of the lung measures approximately 2.5 cm, similar to the prior study.     There is mild to moderate emphysema. There is mild bibasilar scarring versus atelectasis. Right apical scarring unchanged.     PLEURA: Left pleural calcifications unchanged. No pleural effusions. No  pneumothorax.     HEART/GREAT VESSELS: Coronary artery disease. There is atherosclerosis of the thoracic aorta. No thoracic aortic aneurysm.     MEDIASTINUM AND SUDEEP: There is a 2.4 x 2.2 cm right paratracheal node demonstrating low density centrally suggesting necrosis; this is larger compared with February 28, 2024. Additionally, there is a 2.8 x 2 cm precarinal node demonstrating low density   centrally suggesting necrosis, also larger compared to the prior study. Necrotic appearing right hilar adenopathy measuring up to 2 cm.     CHEST WALL AND LOWER NECK: Tiny thyroid nodules, measuring 6 mm or less. Incidental discovery of one or more thyroid nodule(s) measuring less than 1.5 cm and without suspicious features is noted in this patient who is above 35 years old; according to   guidelines published in the February 2015 white paper on incidental thyroid nodules in the Journal of the American College of Radiology (JACR), no further evaluation is recommended.   Interval History: 9/17/2024     Patient is here for treatment and hospital follow up.     Admitted 9/9/24 to 9/12/24 for AMS found to have MRI Brain showing increased cerebral vasogenic edema after recently steroid taper (not stopped), c/f leptomeningeal mets, evidence of new tiny temporal lobe met, evidence of unchanged parietal and occipital mets, and evidence of decreased frontal lobe and cerebellar mets.  She was also noted to have a new mass of her deep R gluteal fossa.  Her mental status rapidly improved with steroids.  Patient underwent LP and biopsy of her R gluteal mass.  Initial LP studies showed 0 WBCs, 0 RBCs, normal glucose, slightly elevated protein at 73, and Gram stain and cultures were negative ruling out meningitis and ICH.  No evidence thus far of leptomeningeal metastasis on LP.  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     She was placed on a steroid taper at discharge on 9/12 as follows:        -dexamethasone 4 BID x4 days f/b       -dexamethasone 4 AM and 2 PM x7 days f/b       -dexamethasone 2 BID x7 days f/b       -dexamethasone 2 daily with further taper per heme/onc     Her levothyroxine was discontinued and will be followed with serial blood draws.     Labs from the day of her discharge (9/13/24) showed folate >22, B12 1268, , WBC 8.5, Hb 10.5, Plt 264, TSH 0.403, FT40.92, and FT3 2.33.     Patient endorsed acute on chronic anal pain that has worsened since her anal mass biopsy.  The pain is sharp and severe and occasionally radiates down her leg.  It is best in the morning after taking her steroids.  She was prescribed oxycodone 5 mg Q4H (36 MME) at hospital discharge which has not helped her pain.  She spoke with palliative over the phone yesterday and has an appointment with them tomorrow.  They recommended changing from oxycodeone to Dilaudid 2 mg PO Q4H (60 MME) and they gave her Narcan.  She is very concerned about taking opioids as she lives alone and would not have anyone to give her Narcan.     She is amanable to radiation therapy and would like to start as soon as possible.           Interval History: 9/23/2024     Biopsy right subcutaneous pelvic mass is consistent with new primary SCC     This addendum is issued to add immunohistochemistry results. The final diagnosis stays the same.  Tumor cells are negative for napsin A.   Addendum electronically signed by Lulu Bailey MD on 9/17/2024 at  8:18 AM   Final Diagnosis   A. Mass, Superficial perianal mass:  - Squamous cell carcinoma.     Note: The patient's prior lung EBUS sampling shows the tumor to stain for TTF1 and Napsin with absent p40 expression.  The current perianal mass sampling shows diffuse p40 expression with absent TTF1 expression.  This may represent a primary anal/perianal squamous cell carcinoma versus a possible metastatic combined lung tumor (adenocarcinoma and previously unsampled squamous component).  Suggest  clinical correlation and appropriate follow-up.      Immunohistochemistry was performed on block A3. The tumor cells are positive for p40, AE1/3, CK7, GATA3; MOC-13 shows nonspecific staining and negative for CDX2, p16, CD45, CD31, synaptophysin, chromogranin, mucicarmine, p53 (wild-type), supporting the above diagnosis.  Napsin A is pending and results will be reported in an addendum.         At this point, Rad Onc needs to radiate for palliation as with significant pain.  Started Celebrex last week as oxycodone was not helping, will not take dilaudid as prescribed by Palliative     At issue is two primary cancers, her lung metastatic, she is not going to tolerate treatment for anal cancer nor a workup and can consider chemo with a taxane to cover SCC  However the immediate issue is the significant pain she has and RT is more appropriate         Plan is to start RT with 15 fractions within the week     Pain better with Celebrex 200 mg bid and Tylenol.  States holds her pain and again wants no narcotics.     She does have moon face from decadron but feels well and with the taper at 2 mg bid at present is not losing balance, having CNS issues     Plan is 2 mg decadron next week     For chemo 7th Oct although concern with IO issues with RT as well as chemo sensitization but do not want to delay chemotherapy at this point                  Interval History: 10/9/2024     Started C1 of Carbooplatin/Pemetrexed/Pembrolizumab which tolerated without issues.  Pain is issue; started RT 6/15 fractions.  For pain control her decadron was increased to 4 mg which inactivated the effectiveness of IO as cannot get above 10 mg prednisone/2 mg decadron.  Pain no better.  Should be seen by Palliative care for pain management     Otherwise, did well with the chemotherapy     Labs 10/3/2024 include Na 139 K 4.5 Cr 0.87 Ca 8.8 ALT/Ast 12/12/ WBC 8.1 Hgb 10.2  plts 220 ANC 7170 lipase 39                  Interval History:    11/5/2024     Admission 10/16/2024-11/4/2024     Again, admitted dizziness/headaches; had been weaned off decadron.  No new brain lesions on MRI; restarted decadron currently again at 2 mg and will remain at this dose.  Synthroid iremains at 50 mcg daily Is very fatigued.  Has COVID diagnosed two days ago on paxlovid.  Has no symptoms at present.  Anxious to restart chemotherapy. Is being followed by Palliative .  Weight 116 pounds.  Facial edema, no SOB/CAIN. Fatigued Continues to have significant anal pain, using NSAIDS.  Radiation was not effective            MRI 10/21/2024       Redemonstration of intracranial metastatic lesions  2.3 cm enhancing lesion at the left occipital lobe is slightly increased in maximum dimension with increased central necrosis at the anterior margin that may be partly due to radiation necrosis.  Additional smaller metastases are stable or mildly decreased in size.  Stable left parieto-occipital edema with mass effect on the left lateral ventricle.  No new metastases. No acute infarct or hemorrhage        CT CAP 10/22/2024        CHEST     LUNGS: 2.5 x 1.8 x 1.9 cm irregular, necrotic right upper lobe medial juxtapleural mass (6/38 and 601/43) has decreased in size from previous study, previously 4.6 x 4.8 x 3.3 cm by report. A 7 x 5 mm spiculated nodule posterior to the dominant mass is   again noted which appears similar (6/41). Another previously described satellite nodule anterior and inferior to the dominant mass in the right lobe previously measuring up to 1.4 cm currently measures about 0.8 x 0.4 cm (6/50. There is some linear   scarring or atelectasis also noted anteriorly and medially from the dominant mass toward the pleura.     New 8 mm right lower lobe nodule (6/94).  New 5 mm left lower lobe nodule (6/93).     Stable 2.4 cm groundglass opacity anterior left upper lobe (6/43).     Mild background emphysema. Mild scattered linear atelectasis or scarring in the lungs.      AIRWAYS: No significant filling defect.     PLEURA: No pleural effusion. Stable right apical pleural-parenchymal scarring. Pleural calcification posterior left lower lobe again noted.     HEART/GREAT VESSELS: Heart is unremarkable for patient's age. Atherosclerotic changes thoracic aorta and coronary arteries. No thoracic aortic aneurysm.     MEDIASTINUM AND SUDEEP:  -Right paratracheal lymph node measuring 1.0 x 0.7 cm (2/44), previously 2.4 x 2.2 cm.     -1.2 x 0.8 cm precarinal lymph node (2/50), significantly decreased from previous diagnostic study in July 2024, difficult to directly compare to previous size on noncontrast PET CT but also possibly slightly smaller. Some FDG avidity was present in this   region on the prior PET study.     -Right hilar lymph node seen on the prior diagnostic CT measures about 6 mm short axis (2/56), previously about 1.7 cm when measured in a similar fashion and is of lower density currently.     -4 mm short axis subcarinal lymph node, previously measured about 9 mm.     The esophagus is unremarkable.     CHEST WALL AND LOWER NECK: Subcentimeter bilateral thyroid nodules. Incidental discovery of one or more thyroid nodule(s) measuring less than 1.5 cm and without suspicious features is noted in this patient who is above 35 years old; according to   guidelines published in the February 2015 white paper on incidental thyroid nodules in the Journal of the American College of Radiology (JACR), no further evaluation is recommended.     ABDOMEN     LIVER/BILIARY TREE: Mild hepatomegaly. Tiny hypodensity in the left hepatic lobe (2/107), stable in retrospect. Mild prominence of the common hepatic duct with tapering of the distal CBD, may be age-related.     GALLBLADDER: No calcified gallstones. No pericholecystic inflammatory change.     SPLEEN: Unremarkable.     PANCREAS: Unremarkable.     ADRENAL GLANDS: Unremarkable.     KIDNEYS/URETERS: There is an ill-defined 2.2 x 1.8 cm  hypodensity within the medial upper pole right kidney which is new from the previous CT study and not clearly detectable on prior PET/CT either. There is no significant perinephric fat stranding or   elevated white blood cell count to suggest pyelonephritis, therefore this is suspicious for metastasis. There is also a 6 mm hypodensity in the posterior lower pole right kidney, barely if at all perceptible on the prior contrast-enhanced CT study.   Although this could correspond to enlargement of a complex subcentimeter cyst (as there are several additional small cysts in the right kidney), additional metastasis is not excluded.  Tiny nonobstructing calculi right kidney measuring up to 2 mm.     Several subcentimeter left renal hypodensities are too small to characterize, unchanged from prior study. No hydronephrosis on either side.     STOMACH AND BOWEL: Colonic diverticulosis without findings of acute diverticulitis.     APPENDIX: No findings to suggest appendicitis.     ABDOMINOPELVIC CAVITY: No diffuse ascites. Trace presacral edema which is new. No pneumoperitoneum. No lymphadenopathy.     VESSELS: Atherosclerotic changes abdominal aorta without evidence of aneurysm.     PELVIS     REPRODUCTIVE ORGANS: Unremarkable for patient's age.     URINARY BLADDER: Tiny focus of gas within the urinary bladder, presumably related to recent catheterization. Correlate clinically.     ABDOMINAL WALL/INGUINAL REGIONS: Enlarging 6.3 x 4.0 x 5.0 cm irregular rim-enhancing mass with central low attenuation in the right ischiorectal fossa abutting the anteromedial margin of the right gluteus britt muscle, the adjacent obturator internus   as well as the right levator ani muscle but without discrete muscle invasion of these structures. This was subcentimeter in size on previous CT study from July in retrospect, measured about 2.7 x 2.0 cm on the previous PET/CT and was FDG avid. This   almost certainly represents significant  enlargement of a necrotic metastasis given interval enlargement over recent subsequent study and could be easily verified with percutaneous biopsy if necessary.     BONES: No acute fracture or suspicious osseous lesion.     IMPRESSION:     1. New small bilateral pulmonary nodules, likely metastases.     2. New right renal lesion(s) concerning for metastasis.     3. Significant interval enlargement of necrotic appearing mass in the right ischiorectal fossa, likely metastasis.     4. Decreasing right upper lobe necrotic mass with stable and/or decreasing satellite nodularity.     5. Improved mediastinal and right hilar adenopathy.     6. Stable 2.4 cm left upper lobe groundglass density.                 Interval History:  12/10/2024    As above, again admitted; with PCP pneumonia as well as PE.  Will complete atovaquone 23 Dec and will then start PCP prophylaxis with bactrim DS M/W/F    Prednisone taper as above    Concern with CNS flair once steroids at 10 mg daily    Actually doing better than expected    Last labs 12/6/2024 with WBC 7.9 Hgb 7.8  plts 371 ANC 6970 Na 142 k 3.8 Cr 0.75 ALT/AST 11/11 Ca 9.0      Admitted 11/25/2024-12/6/2024       presents with fatigue, SOB, home temp of 101.3  Pt woke up in the morning of [id number] and felt fatigue, then 18:00 same day got chills and took temp was 101.8. She has been told to go to ED anytime she has temp greater than 100.3, due to being on immunotherapy.    Pt endorses 2 nights of night sweats waking up soaking wet.  Pt has SOB since [id number].    Pt had trouble go    ing up steps which normally isnt a problem.   Pt has a cough onset [id number], dry cough.    Pt has clots of blood every time she blows the nose for 1 week.   Pt had covid early November was inpatient and treated.   Endorses off balance intermittently for a few months.   Pt has been having morning headaches earier this week but not last   Ayla Nye is a 74 y.o. female patient who originally  presented to the hospital on 11/24/2024 due to fatigue SOB, and home temp of 101.3. 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 She also had cough. CT head was negative but CT PE w/ AP w/ contrast 11/24 showed b/l PE and RUL PNA. She was given one dose of Levofloxacin and started on heparin drip. She was admitted, placed on telemetry, and oncology was consulted. Patient's antibiotics switched to ceftriaxone and vancomycin, pharmacology was consulted. They performed a bronchoscopy on 11/29 and awaited results. MRSA culture came back negative on 11/27 so vancomycin was stopped and pharm consult discontinued. Eliquis was price checked, and her insurance preferred Xarelto for anticoagulation. This was price checked and patient was okay with the price, she was transitioned to Xarelto from the heparin drip on 11/28. On AM of 11/28, patient was desaturating more to the 80s still had heart rates in the 90s, and was still having nightly fevers, maintaining her sepsis criteria. Nasal cannula oxygen was added due to the desaturations. She ranged between needing 2-4 L. The next morning, 11/29, she was saturating better in the low 90s on room air but had hemoglobin of 6.6. Consent was obtained for blood transfusion and she was given 1 unit of PRBCs her hemoglobin then stabilized. Patient had several changes to her antibiotics throughout her stay at this point, and ID was consulted for recommendations. Due to lack of improvement, vancomycin was added back on and ceftriaxone was switched to cefepime. Then cefepime was stopped after 4 days as patient was still not improving and it did not seem to be helping. On 12/3, a bronchoscopy result came back positive for pneumocystic pneumonia, so Atovaquone was added. Pulmonology had given her a one time dose of solumedrol ( discontinuing her home decadron) and then  switched her to prednisone once informed of this new diagnosis. She finished her last dose of vancomycin on 12/5. The prednisone taper was then perfected per Dr. Castro's recommendations. Official plan will be: Atovaquone for total of 21 days. After this, prophylactic bactrim 1 double strength tablet 3x weekly with steroid use. Prednisone taper: Prednisone 40 mg BID start date 12/3. Plan will be this dose for 5 days, then decreased to 60 mg daily, for 5 more days, then 40 mg daily for 5 days, then switch to 20 mg daily until outpatient follow up with medical oncology. Patient's oxygen needs also started improving and was down to 1 L. Home O2 eval ordered before discharge to potentially set up home oxygen for her. Plan for discharge home, patient has home health aid that checks in on her. Plan will be to follow up with pulm and her outpatient oncologists Dr. Castro and Dr. Gamboa. Appointments are made as 1/29/25, 12/10/25, and 1/2/25 respectively. No ID outpatient follow up needed at this time. Medication regimens for Xarelto, Atovaquone, Bactrim, and Prednisone are above under the plan headings Acute Pulmonary Embolism and Pneumonia respectively. Patient discharged in stable condition on 12/6/24. No oxygen needs.         REVIEW OF SYSTEMS: as above   Please note that a 14-point review of systems was performed to include Constitutional, HEENT, Respiratory, CVS, GI, , Musculoskeletal, Integumentary, Neurologic, Rheumatologic, Endocrinologic, Psychiatric, Lymphatic, and Hematologic/Oncologic systems were reviewed and are negative unless otherwise stated in HPI. Positive and negative findings pertinent to this evaluation are incorporated into the history of present illness.        ECOG PS: 1-2     PROBLEM LIST:          PROBLEM LIST:  Patient Active Problem List   Diagnosis    TB lung, latent    Psoriatic arthritis (HCC)    Essential thrombocytosis (HCC)    Hypertension    Mixed hyperlipidemia    Encounter for  monitoring of hydroxyurea therapy    JAK2 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    Age-related osteoporosis without current pathological fracture    Malignant neoplasm of upper lobe, right bronchus or lung (HCC)    Bilateral leg pain    Insomnia    Moderate protein-calorie malnutrition (HCC)    Dehydration    Visual disturbance    Metastatic cancer to brain (HCC)    Brain mass    Malignant neoplasm metastatic to brain (HCC)    Acute metabolic encephalopathy    Altered mental status    Hypothyroidism    Abnormal imaging of central nervous system    Right hip pain    Goals of care, counseling/discussion    Cancer associated pain    Palliative care patient    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 2023), Essential thrombocytosis (HCC), Hyperlipidemia, Hypertension, Mass in chest, Nodular goiter, Osteoporosis, Pneumonia (Oct 16, 2023), Psoriasis, and SIRS (systemic inflammatory response syndrome) (HCC) (06/22/2024).    PAST SURGICAL HISTORY:   has a past surgical history that includes Appendectomy; Mammo needle localization right (all inc) (Right, 07/13/2009); Skin lesion excision; Colonoscopy (10/31/2018); Mammo (historical); DXA procedure(historical) (04/21/2017); Breast cyst excision (Right, 2009); IR lumbar puncture (9/12/2024); and IR biopsy other (9/12/2024).    CURRENT MEDICATIONS  No current facility-administered medications for this visit.     Current Outpatient Medications   Medication Sig Dispense Refill    acetaminophen (TYLENOL) 325 mg tablet Take 2 tablets (650 mg total) by mouth every 6 (six) hours 30 tablet 0    apixaban (Eliquis) 5 mg Take 2 tablets (10 mg total) by mouth 2 (two) times a day for 7 days, THEN 1 tablet (5  mg total) 2 (two) times a day for 23 days. 74 tablet 0    atovaquone (MEPRON) 750 mg/5 mL suspension Take 5 mL (750 mg total) by mouth daily for 19 days 95 mL 0    predniSONE 20 mg tablet Take 2 tablets (40 mg total) by mouth 2 (two) times a day with meals for 3 days, THEN 3 tablets (60 mg total) daily for 5 days, THEN 2 tablets (40 mg total) daily for 5 days, THEN 1 tablet (20 mg total) daily for 5 days. 42 tablet 0    rivaroxaban (Xarelto) 15 mg tablet Take 1 tablet (15 mg total) by mouth 2 (two) times a day for 21 days 42 tablet 0    rivaroxaban (Xarelto) 20 mg tablet Take 1 tablet (20 mg total) by mouth daily with breakfast 30 tablet 0    [START ON 12/24/2024] sulfamethoxazole-trimethoprim (BACTRIM DS) 800-160 mg per tablet Take 1 tablet by mouth daily Starting 12/24, you can take one tablet Monday, Wednesday, and Fridays. Do not start before December 24, 2024. 12 tablet 0     Facility-Administered Medications Ordered in Other Visits   Medication Dose Route Frequency Provider Last Rate Last Admin    aluminum-magnesium hydroxide-simethicone (MAALOX) oral suspension 30 mL  30 mL Oral Q6H PRN Yuan Stephenson MD        atovaquone (MEPRON) oral suspension 750 mg  750 mg Oral BID With Meals Magnolia Stanton MD   750 mg at 12/06/24 0951    benzonatate (TESSALON PERLES) capsule 200 mg  200 mg Oral TID PRN Fabby Monzon DO        cyanocobalamin (VITAMIN B-12) tablet 1,000 mcg  1,000 mcg Oral Daily Yuan Stephenson MD   1,000 mcg at 12/06/24 0951    fentaNYL injection   Intravenous PRN Greg Gamble CRNA        folic acid (FOLVITE) tablet 1 mg  1 mg Oral Daily Yuan Stephenson MD   1 mg at 12/06/24 0951    gabapentin (NEURONTIN) capsule 100 mg  100 mg Oral BID before breakfast/lunch Yuan Stephenson MD   100 mg at 12/06/24 0510    gabapentin (NEURONTIN) capsule 300 mg  300 mg Oral HS Yuan Stephenson MD   300 mg at 12/05/24 2106    ibuprofen (MOTRIN) tablet 400 mg  400 mg Oral  Q6H PRN Fabby Sovak, DO   400 mg at 11/30/24 0904    lactobacillus acidophilus-bulgaricus (FLORANEX) packet 1 packet  1 packet Oral Daily Yuan Stephenson MD   1 packet at 12/06/24 0959    levETIRAcetam (KEPPRA) tablet 1,000 mg  1,000 mg Oral Q12H KRISS Yuan Stephenson MD   1,000 mg at 12/06/24 0951    levothyroxine tablet 50 mcg  50 mcg Oral Early Morning Yuan Stephenson MD   50 mcg at 12/06/24 0510    LORazepam (ATIVAN) injection 1 mg  1 mg Intramuscular Q8H PRN Yuan Stephenson MD        LORazepam (ATIVAN) injection 2 mg  2 mg Intravenous Q8H PRN Yuan Stephenson MD        midazolam (VERSED) injection   Intravenous PRN Greg Gamble CRNA        ondansetron (ZOFRAN) injection 4 mg  4 mg Intravenous Q6H PRN Yuan Stephenson MD        pantoprazole (PROTONIX) EC tablet 40 mg  40 mg Oral Early Morning Yuan Stephenson MD   40 mg at 12/06/24 0510    predniSONE tablet 40 mg  40 mg Oral BID With Meals Eliseo Small MD   40 mg at 12/06/24 0959    Followed by    [START ON 12/8/2024] predniSONE tablet 60 mg  60 mg Oral Daily Eliseo Small MD        Followed by    [START ON 12/13/2024] predniSONE tablet 40 mg  40 mg Oral Daily Eliseo Small MD        Followed by    [START ON 12/18/2024] predniSONE tablet 20 mg  20 mg Oral Daily Eliseo Small MD        rivaroxaban (XARELTO) tablet 15 mg  15 mg Oral BID With Meals Fabby Sovak, DO   15 mg at 12/06/24 0959    senna (SENOKOT) tablet 8.6 mg  1 tablet Oral HS Gabriela Skinner MD   8.6 mg at 12/04/24 2302     [unfilled]    SOCIAL HISTORY:   reports that she has quit smoking. Her smoking use included cigarettes. She started smoking about 58 years ago. She has a 58.9 pack-year smoking history. She has been exposed to tobacco smoke. She has never used smokeless tobacco. She reports current alcohol use of about 5.0 standard drinks of alcohol per week. She reports that she does not use drugs.     FAMILY HISTORY:  family history  includes Cancer in her brother and brother; Coronary artery disease in her brother; Depression in her mother; Hearing loss in her mother; Hypertension in her brother and mother; No Known Problems in her daughter, daughter, maternal aunt, maternal aunt, maternal aunt, maternal aunt, maternal grandfather, maternal grandmother, paternal aunt, paternal grandfather, and paternal grandmother; Stroke in her mother; Tuberculosis in her brother and father.     ALLERGIES:  is allergic to doxycycline, keflex [cephalexin], and neomycin-polymyxin-dexameth.      Physical Exam:  Vital Signs:   Visit Vitals  OB Status Postmenopausal   Smoking Status Former     There is no height or weight on file to calculate BMI.  There is no height or weight on file to calculate BSA.    GEN: Alert, awake oriented x3, in no acute distress  HEENT- No pallor, icterus, cyanosis, no oral mucosal lesions,   LAD - no palpable cervical, clavicle, axillary, inguinal LAD  Heart- normal S1 S2, regular rate and rhythm, No murmur, rubs.   Lungs- clear breathing sound bilateral.   Abdomen- soft, Non tender, bowel sounds present  Extremities- No cyanosis, clubbing, edema  Neuro- No focal neurological deficit    Labs:  Lab Results   Component Value Date    WBC 7.92 12/06/2024    HGB 7.8 (L) 12/06/2024    HCT 25.2 (L) 12/06/2024     (H) 12/06/2024     12/06/2024     Lab Results   Component Value Date    SODIUM 142 12/06/2024    K 3.8 12/06/2024     12/06/2024    CO2 27 12/06/2024    AGAP 10 12/06/2024    BUN 24 12/06/2024    CREATININE 0.75 12/06/2024    GLUC 94 12/06/2024    GLUF 100 (H) 10/03/2024    CALCIUM 9.0 12/06/2024    AST 11 (L) 12/06/2024    ALT 11 12/06/2024    ALKPHOS 37 12/06/2024    TP 6.1 (L) 12/06/2024    TBILI 0.24 12/06/2024    EGFR 78 12/06/2024           I spent 40 minutes on chart review, face to face counseling time, coordination of care and documentation.    Rosalinda Castro MD PhD

## 2024-12-06 NOTE — RESPIRATORY THERAPY NOTE
Home Oxygen Qualifying Test     Patient name: Ayla Nye        : 1950   Date of Test:  2024  Diagnosis:    Home Oxygen Test:    **Medicare Guidelines require item(s) 1-5 on all ambulatory patients or 1 and 2 on non-ambulatory patients.    1. Baseline SPO2 on Room Air at rest 96 %   SPO2 during exertion on Room Air 89  %  During exertion monitor SPO2. If SPO2 increases >=89%, do not add supplemental oxygen  Test performed during exertion activity.      []  Supplemental Home Oxygen is indicated.    [x]  Client does not qualify for home oxygen.    Respiratory Additional Notes- pt does not need supplemental O2    Cathy Walsh, RT

## 2024-12-06 NOTE — ASSESSMENT & PLAN NOTE
PERC 2  WELLS 2.5  PESI 104 Class III Intermediate risk   [id number]: CT pe study w abdomen pelvis w contrast  PULMONARY ARTERIAL TREE: Embolism straddles between the middle lobe and right lower lobe basal segment arteries, as seen on series 301/112, this is new from the most recent prior.   Additional embolism noted within the left lower lobe basal arteries straddling into the lower lingular segment, as noted on series 301/, also new from prior.  RV diameter at the level of the AV valve = 3. 9 cm  LV diameter at the level of the AV valve = 2.7 cm  Ratio equals 1.4  CLASSIFICATION Acute hemodynamically stable bilateral right sided segmental left basilar associated with SOB most likely provoked from cancer hx while on immunotherapy   ECHO : EF 65%, diastolic dysfunction grade I. Mitral valve has mild annular calcification and tricuspid valve has mild regurgitation.     PLAN  Xarelto started , heparin drip stopped. Price check performed and acceptable for patient. Plan will be this dose for 21 days, then 20 mg daily for 6 months, then 10 mg indefinitely.

## 2024-12-06 NOTE — ASSESSMENT & PLAN NOTE
Component      Latest Ref Rn 2/16/2024 3/12/2024 4/5/2024 4/30/2024 5/20/2024   Absolute Neutrophils      1.85 - 7.62 Thousand/uL 6.06  5.47  4.95  5.71  5.87      Component      Latest Ref Rng 5/28/2024 6/3/2024 6/10/2024 6/18/2024 6/22/2024   Absolute Neutrophils      1.85 - 7.62 Thousand/uL 5.89  6.31  5.21  3.42  3.63      Component      Latest Ref Rng 6/23/2024 6/24/2024 7/1/2024 7/2/2024 7/3/2024 7/15/2024   Absolute Neutrophils      1.85 - 7.62 Thousand/uL 2.38  1.58 (L)  1.81 (L)  2.08  1.83 (L)  2.08      Component      Latest Ref Rng 7/29/2024 8/21/2024 8/22/2024 8/23/2024 8/24/2024   Absolute Neutrophils      1.85 - 7.62 Thousand/uL 2.95  6.57  5.54  6.22  4.46      Component      Latest Ref Rn 9/9/2024 9/10/2024 9/11/2024 9/12/2024 9/13/2024   Absolute Neutrophils      1.85 - 7.62 Thousand/uL 5.00  3.22  5.80  4.26  7.84 (H)      Component      Latest Ref Rng 10/3/2024 10/16/2024 10/21/2024 10/22/2024 10/27/2024   Absolute Neutrophils      1.85 - 7.62 Thousand/uL 7.17  1.58 (L)  2.79  4.39  9.78 (H)      Component      Latest Ref Rng 11/11/2024 11/24/2024   Absolute Neutrophils      1.85 - 7.62 Thousand/uL 9.40 (H)  1.54 (L)       Recent Labs     12/04/24  0514 12/05/24  0638   AST 13 12*   ALT 12 11   ALKPHOS 41 36   TBILI 0.26 0.21         Fever at home 101.3  Urine culture 11/24: < 10,000 cfu/mL mixed contaminants x2  PLAN:  Antibiotics: Atovaquone. Vanc completed 12/5  -Oncology consulted  - Pulm consulted: trial of solumedrol 40 mg daily x1, now prednisone as above  -ID consulted  Neutropenic precautions

## 2024-12-06 NOTE — PROGRESS NOTES
Progress Note - Infectious Disease   Ayla Nye 74 y.o. female MRN: [id number]  Unit/Bed#: S -01 Encounter: [id number]      Impression/Plan:  1.  Abnormal CT chest.  Patient presented with progressive respiratory complaints along with sweats and fevers.  CT ultimately showed these right upper lobe changes.  She has been on antibiotics essentially since admission with cultures being unrevealing.  Based on timeline of bronchoscopy cultures obtained my suspicion is low for a resistant gram-negative infection.  Consideration remains for MRSA pneumonia and cultures of course may have been affected by previous vancomycin.  PJP PCR returned positive, uncertain if this represents colonization or true disease.  Steroids were being tapered prior to presentation and imaging atypical.  LDH mildly elevated.  Fungitell pending.  Symptomatically improving which I suspect is largely due to steroid increase.  Would still question noninfectious etiology including pneumonitis to patient's immunotherapy given steroids were being tapered down and patient presented with acute symptoms.  Plan remains for empiric PJP therapy given atovaquone fairly benign, prophylaxis and further steroid tapers as outpatient.  Vancomycin course completed on 12/5.  No further systemic antibiotics  Follow-up pending Fungitell for completeness as outpatient  Continue atovaquone 750 mg twice daily for total 21 days  Then transition to Bactrim 1 DS, 3 times a week as prophylaxis with steroid use  Steroid taper as per oncology  Trend fever curve/WBC while admitted  Ongoing follow-up by pulmonary  Additional supportive care/discharge per primary  No anticipated follow-up in the ID office     2.  Acute hypoxic respiratory failure.  Patient with new oxygen requirement since admission which I suspect is multifactorial given the changes on CT and also #3.  Antibiotics as above  Ongoing followed by pulmonary  Oxygen weaning when able  Trend fever  curve/WBC     3.  Bilateral pulmonary emboli.  Noted on initial CT.  Ongoing anticoagulation per primary     4.  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  Patient is currently on chemotherapy as well as immunotherapy.  Uncertain if this may be contributing to lung findings now on CT.  Antibiotics completed   Empiric PJP therapy as above  Recommendation for prophylaxis then as above  Ongoing follow-up by pulmonary  Steroid regimen adjusted after discussion with oncology  Patient will need follow-up with oncology as outpatient     5.  Chronic leukopenia.  Noted known labs.  Appears to be close to patient's baseline.  Would monitor intermittently while patient remains admitted.     Above plan discussed in detail with the patient at bedside  Above plan discussed briefly with primary service who is aware of plans for current treatment regimen and reports plans for discharge today    Given plans for discharge today, ID consult service will sign off at this time.  Please call if questions.     Antibiotics:  Atovaquone 4    24 Hour events:  Yesterday and overnight notes reviewed and no acute events noted    Subjective:  Patient seen at bedside this morning and she denied having any nausea, vomiting, chest pain or shortness of breath.  Still planned to undergo oxygen needs assessment today.  Tolerating medications otherwise without issue.    Objective:  Vitals:  Temp:  [97.3 °F (36.3 °C)] 97.3 °F (36.3 °C)  HR:  [64-83] 74  Resp:  [18] 18  BP: (123-172)/(86-90) 144/86  SpO2:  [90 %-97 %] 91 %  Temp (24hrs), Av.3 °F (36.3 °C), Min:97.3 °F (36.3 °C), Max:97.3 °F (36.3 °C)  Current: Temperature: (!) 97.3 °F (36.3 °C)    Physical Exam:   General Appearance:  Alert, interactive, nontoxic, no acute distress.   Throat: Oropharynx moist without lesions.    Lungs:   Clear to auscultation bilaterally; no wheezes, rhonchi or rales; respirations unlabored   Heart:  RRR; no murmur, rub or gallop   Abdomen:   Soft,  non-tender, non-distended, positive bowel sounds.     Extremities: No clubbing, cyanosis or edema   Skin: No new rashes or lesions. No new draining wounds noted.       Labs, Imaging, & Other studies:   All pertinent labs and imaging studies in PACS were personally reviewed as below.  Results from last 7 days   Lab Units 12/06/24  0506 12/05/24  0638 12/04/24  0514   WBC Thousand/uL 7.92 6.85 6.49   HEMOGLOBIN g/dL 7.8* 7.7* 8.9*   PLATELETS Thousands/uL 371 332 400*     Results from last 7 days   Lab Units 12/06/24  0506 12/05/24  0638 12/04/24  0514   POTASSIUM mmol/L 3.8 3.7 4.3   CHLORIDE mmol/L 105 106 101   CO2 mmol/L 27 29 28   BUN mg/dL 24 21 18   CREATININE mg/dL 0.75 0.72 0.82   EGFR ml/min/1.73sq m 78 82 70   CALCIUM mg/dL 9.0 9.1 9.7   AST U/L 11* 12* 13   ALT U/L 11 11 12   ALK PHOS U/L 37 36 41     Results from last 7 days   Lab Units 12/01/24  1657 11/29/24  1612   BLOOD CULTURE  No Growth After 4 Days.  No Growth After 4 Days.  --    GRAM STAIN RESULT   --  1+ Polys  No bacteria seen       Lab interpretation/comments: No leukocytosis.  Creatinine stable.  LFTs unremarkable.    Imaging interpretation/comments: No new imaging    Culture data: Repeat blood cultures remain negative at 4 days.  Fungitell still pending    External notes: None

## 2024-12-06 NOTE — ASSESSMENT & PLAN NOTE
Blood Pressure: 164/90  Recent Labs     12/04/24  0514 12/05/24  0638 12/06/24  0506   HGB 8.9* 7.7* 7.8*   BL 8-11    Plan:  -monitor, on Xarelto.   - on AM of 11/29, patient had hemoglobin of 6.6. no coughing up blood, or blood in stool or urine. Only slight bleeding with blowing her nose that she notes. Prepared and gave 1 unit of leukoreduced PRBCs. Repeat H&H afterwards was 9.2.  Transfuse if Hb < 7

## 2024-12-06 NOTE — CASE MANAGEMENT
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     Palliative care patient 2024     Cancer associated pain 2024     Goals of care, counseling/discussion 2024     Right hip pain 09/10/2024     Altered mental status 2024     Hypothyroidism 2024     Abnormal imaging of central nervous system 2024     Acute metabolic encephalopathy 2024     Malignant neoplasm metastatic to brain (HCC) 2024     Brain mass 2024     Metastatic cancer to brain (HCC) 2024     Visual disturbance 2024     Dehydration 2024     Moderate protein-calorie malnutrition (HCC) 2024     Bilateral leg pain 2024     Insomnia 2024     Malignant neoplasm of upper lobe, right bronchus or lung (HCC) 2024     Age-related osteoporosis without current pathological fracture 2023     Encounter for monitoring of hydroxyurea therapy 2023     JAK2 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 2023     Hypertension 08/10/2022     Mixed hyperlipidemia 08/10/2022     Essential thrombocytosis (HCC) 2020     TB lung, 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     Psoriatic arthritis (HCC) 09/10/2019       LOS (days): 11  Geometric Mean LOS (GMLOS) (days): 4.9  Days to GMLOS:-6.5     OBJECTIVE:  Risk of Unplanned Readmission Score: 44.2         Current admission status: Inpatient   Preferred Pharmacy:   [id number] - [place], [place] - [address] [facility]  [address] [place]  [facility] [address]  Phone: [phone] Fax: [phone]    [facility] [place] - [address]  [address]  [place] [address]  Phone: [phone] Fax: [phone]    Primary Care Provider: Rafy Angelo MD    Primary Insurance: MEDICARE  Secondary Insurance: AARP    DISCHARGE DETAILS:    Discharge planning discussed with:: Juliana Camarena (Daughter)  Freedom of Choice: Yes  Comments - Freedom of Choice: Reviewed DCP  CM contacted family/caregiver?: Yes  Were Treatment Team discharge recommendations reviewed with patient/caregiver?: Yes  Did patient/caregiver verbalize understanding of patient care needs?: Yes  Were patient/caregiver advised of the risks associated with not following Treatment Team discharge recommendations?: Yes    Contacts  Patient Contacts: Juliana Camarena (Daughter)  Relationship to Patient:: Family  Contact Method: Phone  Phone Number: [phone]  Reason/Outcome: Discharge Planning, Emergency Contact, Continuity of Care    Requested Home Health Care         Is the patient interested in HHC at discharge?: No    DME Referral Provided  Referral made for DME?: No    Other Referral/Resources/Interventions Provided:  Interventions: HHC         Treatment Team Recommendation: Home  Discharge Destination Plan:: Home  Transport at Discharge : Family                                         CM reached out to patient's daughter as requested by [facility] provider to answer questions about HHC at [place].  CM spoke with patient's daughter Juliana and explained that HHC is not indicated at this time as patient does not have any skilled need.  If they feel as though she would need additional supervision at [place] then they would need to look into adding additional hours into their HHA services that they currently have in place.   Daughter verbalized understanding of this.    CM  also discussed with daughter that if patient needs Home O2 at [place] that we would coordinate this through [facility] prior to DC.    All questions were answered at this time.    CM department will continue to follow to assist with discharge coordination.

## 2024-12-07 RX ORDER — ATOVAQUONE 750 MG/5ML
750 SUSPENSION ORAL 2 TIMES DAILY
Qty: 95 ML | Refills: 0 | Status: SHIPPED | OUTPATIENT
Start: 2024-12-07 | End: 2024-12-26

## 2024-12-09 ENCOUNTER — TELEPHONE (OUTPATIENT)
Dept: NEUROLOGY | Facility: CLINIC | Age: 74
End: 2024-12-09

## 2024-12-09 ENCOUNTER — TRANSITIONAL CARE MANAGEMENT (OUTPATIENT)
Age: 74
End: 2024-12-09

## 2024-12-09 ENCOUNTER — TELEPHONE (OUTPATIENT)
Age: 74
End: 2024-12-09

## 2024-12-09 LAB — FUNGUS SPEC CULT: NORMAL

## 2024-12-09 NOTE — TELEPHONE ENCOUNTER
LMOM for pt to schedule hospital follow up with Shayna AMOR. Offered our [place], [place], or [place] office. Provided pt with callback number to call us to make appointment.

## 2024-12-10 ENCOUNTER — PATIENT OUTREACH (OUTPATIENT)
Dept: HEMATOLOGY ONCOLOGY | Facility: CLINIC | Age: 74
End: 2024-12-10

## 2024-12-10 ENCOUNTER — OFFICE VISIT (OUTPATIENT)
Dept: HEMATOLOGY ONCOLOGY | Facility: CLINIC | Age: 74
End: 2024-12-10
Payer: MEDICARE

## 2024-12-10 ENCOUNTER — TELEPHONE (OUTPATIENT)
Dept: NEUROLOGY | Facility: CLINIC | Age: 74
End: 2024-12-10

## 2024-12-10 VITALS
OXYGEN SATURATION: 99 % | WEIGHT: 119 LBS | BODY MASS INDEX: 21.9 KG/M2 | SYSTOLIC BLOOD PRESSURE: 138 MMHG | HEIGHT: 62 IN | DIASTOLIC BLOOD PRESSURE: 98 MMHG | TEMPERATURE: 98.2 F | RESPIRATION RATE: 17 BRPM | HEART RATE: 81 BPM

## 2024-12-10 DIAGNOSIS — I26.99 OTHER ACUTE PULMONARY EMBOLISM, UNSPECIFIED WHETHER ACUTE COR PULMONALE PRESENT (HCC): ICD-10-CM

## 2024-12-10 DIAGNOSIS — Z15.89 JAK2 V617F MUTATION: ICD-10-CM

## 2024-12-10 DIAGNOSIS — D47.3 ESSENTIAL THROMBOCYTOSIS (HCC): ICD-10-CM

## 2024-12-10 DIAGNOSIS — C79.31 METASTATIC CANCER TO BRAIN (HCC): ICD-10-CM

## 2024-12-10 DIAGNOSIS — B59 PNEUMONIA OF RIGHT UPPER LOBE DUE TO PNEUMOCYSTIS JIROVECII (HCC): ICD-10-CM

## 2024-12-10 DIAGNOSIS — C34.11 MALIGNANT NEOPLASM OF UPPER LOBE, RIGHT BRONCHUS OR LUNG (HCC): Primary | ICD-10-CM

## 2024-12-10 DIAGNOSIS — G93.89 BRAIN MASS: ICD-10-CM

## 2024-12-10 PROCEDURE — 99215 OFFICE O/P EST HI 40 MIN: CPT | Performed by: INTERNAL MEDICINE

## 2024-12-10 RX ORDER — SULFAMETHOXAZOLE AND TRIMETHOPRIM 800; 160 MG/1; MG/1
1 TABLET ORAL DAILY
Qty: 36 TABLET | Refills: 4 | Status: SHIPPED | OUTPATIENT
Start: 2024-12-24 | End: 2025-06-22

## 2024-12-10 RX ORDER — PREDNISONE 10 MG/1
10 TABLET ORAL DAILY
Qty: 60 TABLET | Refills: 1 | Status: SHIPPED | OUTPATIENT
Start: 2024-12-10

## 2024-12-10 NOTE — PROGRESS NOTES
I reached out to Ayla now that she is on T[place] to reassess for any barriers to care and offer any supportive services that may be needed. I left  with reason for my call including my direct phone number [phone]

## 2024-12-10 NOTE — PROGRESS NOTES
Patient outreach made    3x    to review for any barriers to care so I can offer any supportive services that  Ayla      may need. I left detailed VM with the reason for my call including my direct contact number. I have not heard back therefore I will remove myself from care team but will remain available if patient calls back or needs my assistance.

## 2024-12-10 NOTE — TELEPHONE ENCOUNTER
Tried to call pt to get her scheduled for an appt but she was being brought back for an appt at the moment so I informed her I will call back at another time

## 2024-12-11 ENCOUNTER — OFFICE VISIT (OUTPATIENT)
Age: 74
End: 2024-12-11
Payer: MEDICARE

## 2024-12-11 VITALS
OXYGEN SATURATION: 96 % | SYSTOLIC BLOOD PRESSURE: 134 MMHG | TEMPERATURE: 97.7 F | WEIGHT: 120.4 LBS | RESPIRATION RATE: 18 BRPM | HEART RATE: 72 BPM | DIASTOLIC BLOOD PRESSURE: 87 MMHG | BODY MASS INDEX: 22.16 KG/M2 | HEIGHT: 62 IN

## 2024-12-11 DIAGNOSIS — Z51.5 PALLIATIVE CARE PATIENT: ICD-10-CM

## 2024-12-11 DIAGNOSIS — G89.3 CANCER ASSOCIATED PAIN: Primary | ICD-10-CM

## 2024-12-11 DIAGNOSIS — C79.31 METASTATIC CANCER TO BRAIN (HCC): ICD-10-CM

## 2024-12-11 DIAGNOSIS — G89.3 CANCER RELATED PAIN: ICD-10-CM

## 2024-12-11 PROCEDURE — 99348 HOME/RES VST EST LOW MDM 30: CPT

## 2024-12-11 PROCEDURE — G2211 COMPLEX E/M VISIT ADD ON: HCPCS

## 2024-12-11 RX ORDER — ACETAMINOPHEN 325 MG/1
650 TABLET ORAL EVERY 6 HOURS PRN
Status: SHIPPED
Start: 2024-12-11 | End: 2025-01-10

## 2024-12-11 RX ORDER — GABAPENTIN 100 MG/1
CAPSULE ORAL
Status: SHIPPED
Start: 2024-12-11

## 2024-12-11 NOTE — PROGRESS NOTES
[facility] Visit: follow up   Name: Ayla Nye      : 1950      MRN: [id number]  Encounter Provider: ZULEYMA Pollard  Encounter Date: 2024   Encounter department: [facility]    Assessment & Plan   1. Cancer associated pain  Assessment & Plan:  Current regimen:  Gabapentin 100mg AM, 300mg Bedtime  Tylenol 650mg PRN  Heating pad during the day  Failed therapies:  Celebrex 200mg BID  Oxycodone- felt like she had a headache from the medication  Would like to avoid opiates  Bowel regimen to prevent OIC:  Senna      2. Metastatic cancer to brain (HCC)  Assessment & Plan:  Follows with Dr. Castro.   Completed SRS  Immunotherapy discontinued- plan is to initiate chemo therapy.   On prednisone taper - managed by Dr. Castro.    Orders:  -     acetaminophen (TYLENOL) 325 mg tablet; Take 2 tablets (650 mg total) by mouth every 6 (six) hours as needed for mild pain or moderate pain Taking as needed.  3. Palliative care patient  Assessment & Plan:  Ayla follows with palliative care for psychosocial support and symptom management of lung cancer with brain mets, perianal tumor   Symptoms - pain well controlled.    ACP -  on file   RTC - 4 weeks    Lives alone.   Has two daughters .   Ex- is supportive and involved in care  Has brother that lives close by that helps as well.   Daughters are getting a life alert set up for patient.     No longer drives.     Orders:  -     gabapentin (NEURONTIN) 100 mg capsule; Take 1 capsule PO in the AM  and 3 capsules PO HS.  4. Cancer related pain  -     gabapentin (NEURONTIN) 100 mg capsule; Take 1 capsule PO in the AM  and 3 capsules PO HS.      Subjective   Chief Complaint   Patient presents with   • Follow-up     Follow up home visit for psychosocial support and symptom management for palliative diagnosis of lung cancer with mets to brain, perianal tumor.        History of Present Illness      Ayla Nye is a 74 y.o. female w/ Stage III NSCLC, new onset brain mets 7/30/24, completed SRS. She was found to have mass in the right anorectal fat/right ischial tuberosity per PET. She follows with [facility]      Since last visit patient has had multiple admissions for fatigue and SOB. She was told she has a fungal pna- is on abx.     Patient is seen in the home with palliative RN present.     She reports that she has a private hire patient advocate to help her manager he medications. She comes in every Sunday to set her meds up for the week.     Was seen by Dr. Castro yesterday. She has stopped immunotherapy. Patient reports they are considering going back to chemo.     She is on a taper of prednisone managed by Dr. Castro.     Denies pain.   Eating- ok, has diminished taste, can tasted sweets. Has metallic taste- discussed using zinc.  Sleep- trying to get sleep. Sleep improving as prednisone is being tapered.    Pain is improved-she takes tylenol PRN. Would like to wean gabapentin- will have her take morning dose and evening dose. Stop afternoon dose.   BM's- regular, using senna daily.       Current Outpatient Medications:   •  acetaminophen (TYLENOL) 325 mg tablet, Take 2 tablets (650 mg total) by mouth every 6 (six) hours as needed for mild pain or moderate pain Taking as needed., Disp: , Rfl:   •  amLODIPine (NORVASC) 5 mg tablet, Take 1 tablet (5 mg total) by mouth daily, Disp: 30 tablet, Rfl: 0  •  atovaquone (MEPRON) 750 mg/5 mL suspension, Take 5 mL (750 mg total) by mouth 2 (two) times a day for 19 days, Disp: 95 mL, Rfl: 0  •  folic acid (FOLVITE) 1 mg tablet, Take 1 tablet (1 mg total) by mouth daily, Disp: 30 tablet, Rfl: 6  •  gabapentin (NEURONTIN) 100 mg capsule, Take 1 capsule PO in the AM  and 3 capsules PO HS., Disp: , Rfl:   •  levETIRAcetam (KEPPRA) 1000 MG tablet, Take 1 tablet (1,000 mg total) by mouth every 12 (twelve) hours, Disp: 60 tablet, Rfl: 0  •   "levothyroxine 50 mcg tablet, Take 1 tablet (50 mcg total) by mouth daily in the early morning, Disp: 30 tablet, Rfl: 0  •  pantoprazole (PROTONIX) 40 mg tablet, Take 1 tablet (40 mg total) by mouth daily in the early morning, Disp: 30 tablet, Rfl: 0  •  Probiotic Product (PROBIOTIC DAILY PO), Take 1 capsule by mouth daily ON HOLD, Disp: , Rfl:   •  senna (SENOKOT) 8.6 MG tablet, Take 1 tablet by mouth daily Takes one every other day, Disp: , Rfl:   •  vitamin B-12 (VITAMIN B-12) 1,000 mcg tablet, Take 1 tablet (1,000 mcg total) by mouth daily, Disp: 90 tablet, Rfl: 1  •  predniSONE 10 mg tablet, Take 1 tablet (10 mg total) by mouth daily, Disp: 60 tablet, Rfl: 1  •  predniSONE 20 mg tablet, Take 2 tablets (40 mg total) by mouth 2 (two) times a day with meals for 3 days, THEN 3 tablets (60 mg total) daily for 5 days, THEN 2 tablets (40 mg total) daily for 5 days, THEN 1 tablet (20 mg total) daily for 5 days., Disp: 42 tablet, Rfl: 0  •  rivaroxaban (Xarelto) 15 mg tablet, Take 1 tablet (15 mg total) by mouth 2 (two) times a day for 21 days, Disp: 42 tablet, Rfl: 0  •  rivaroxaban (Xarelto) 20 mg tablet, Take 1 tablet (20 mg total) by mouth daily with breakfast, Disp: 30 tablet, Rfl: 0  •  [START ON 12/24/2024] sulfamethoxazole-trimethoprim (BACTRIM DS) 800-160 mg per tablet, Take 1 tablet by mouth daily Starting 12/24, you can take one tablet Monday, Wednesday, and Fridays. Do not start before December 24, 2024., Disp: 36 tablet, Rfl: 4  No current facility-administered medications for this visit.    Objective   /87 (BP Location: Left arm, Patient Position: Sitting, Cuff Size: Standard)   Pulse 72   Temp 97.7 °F (36.5 °C) (Temporal)   Resp 18   Ht 5' 2\" (1.575 m)   Wt 54.6 kg (120 lb 6.4 oz)   SpO2 96%   BMI 22.02 kg/m²   Physical Exam  Vitals reviewed.   Constitutional:       General: She is not in acute distress.  HENT:      Head: Normocephalic and atraumatic.   Eyes:      General:         Right eye: " No discharge.         Left eye: No discharge.   Cardiovascular:      Rate and Rhythm: Normal rate.   Pulmonary:      Effort: Pulmonary effort is normal. No respiratory distress.      Breath sounds: Decreased breath sounds present.   Abdominal:      General: Bowel sounds are normal.      Palpations: Abdomen is soft.   Musculoskeletal:         General: Normal range of motion.      Cervical back: Normal range of motion.   Skin:     General: Skin is warm and dry.      Findings: Bruising present.   Neurological:      General: No focal deficit present.      Mental Status: She is alert and oriented to person, place, and time. Mental status is at baseline.   Psychiatric:         Mood and Affect: Mood normal.         Behavior: Behavior normal.         Thought Content: Thought content normal.         Judgment: Judgment normal.        Recent labs:  Lab Results   Component Value Date/Time    SODIUM 142 12/06/2024 05:06 AM    K 3.8 12/06/2024 05:06 AM    BUN 24 12/06/2024 05:06 AM    CREATININE 0.75 12/06/2024 05:06 AM    GLUC 94 12/06/2024 05:06 AM    CALCIUM 9.0 12/06/2024 05:06 AM    AST 11 (L) 12/06/2024 05:06 AM    ALT 11 12/06/2024 05:06 AM    ALB 3.1 (L) 12/06/2024 05:06 AM    TP 6.1 (L) 12/06/2024 05:06 AM    EGFR 78 12/06/2024 05:06 AM     Lab Results   Component Value Date/Time    HGB 7.8 (L) 12/06/2024 05:06 AM    WBC 7.92 12/06/2024 05:06 AM     12/06/2024 05:06 AM    INR 0.91 11/24/2024 07:30 PM    PTT 43 (H) 11/29/2024 08:09 AM     Lab Results   Component Value Date/Time    [id number] 1.109 11/24/2024 07:30 PM       Recent Imaging:  Procedure: XR chest portable  Result Date: 12/1/2024  Narrative: XR CHEST PORTABLE INDICATION: increased o2 requirements. COMPARISON: CXR 11/29/2024, chest CT 11/24/2024. FINDINGS: Redemonstration of right upper lobe nodule and right upper lobe pneumonia. No pneumothorax or pleural effusion. Normal cardiomediastinal silhouette. Bones are unremarkable for age. Normal upper  abdomen.     Impression: Redemonstration of right upper lobe nodule and right upper lobe pneumonia with no significant change. Workstation performed: [id number]     Procedure: Bronchoscopy  Result Date: 11/29/2024  Narrative: Table formatting from the original result was not included. Formerly [facility] [address] [place] [address] [phone] DATE OF SERVICE: 11/29/24 PHYSICIAN(S): Attending: Teresita Forde DO Fellow: Eliseo Small MD INDICATION: Pneumonia, Acute hypoxic respiratory failure (HCC) POST-OP DIAGNOSIS: See the impression below. PREPROCEDURE: Standard airway preparation completed per respiratory therapy protocol.  Informed consent was obtained. Images reviewed prior to the procedure.  A Time Out was performed. No suspicion or identified risk for TB or other airborne infectious disease; bronchoscopy procedure being performed for diagnostic purposes. PROCEDURE: Bronchoscopy DETAILS OF PROCEDURE: Patient was taken to the procedure room where a time out was performed to confirm correct patient and correct procedure. The patient underwent moderate sedation, which was administered by an anesthesia professional. The patient's ECG, blood pressure, heart rate, oxygen and respirations were monitored throughout the procedure. The patient experienced no blood loss. The scope was introduced through the right naris. The procedure was not difficult. The patient tolerated the procedure well. There were no apparent adverse events. ANESTHESIA INFORMATION: ASA: IV Anesthesia Type: General FINDINGS: Bronchoalveolar lavage was performed x3 in the right posterior segment (RB2) with 180 mL of saline instilled and a total return of 70 mL. The fluid appeared cloudy. The vocal cords, trachea, main ramin, left main stem, left upper lobar bronchus, left apical-posterior segment (LB1+2), left upper anterior segment (LB3), lingular lobar bronchus, superior lingular segment (LB4), inferior lingular  segment (LB5), left lower lobar bronchus, left superior segment (LB6), left anterior basal segment (LB7+8), left lateral basal segment (LB9), left posterior basal segment (LB10), right main stem, right upper lobar bronchus, right apical segment (RB1), right posterior segment (RB2), right anterior segment (RB3), bronchus intermedius, right middle lobar bronchus, right lateral segment (RB4), right medial segment (RB5), right lower lobar bronchus, right superior segment (RB6), right medial basal segment (RB7), right anterior basal segment (RB8), right lateral basal segment (RB9) and right posterior basal segment (RB10) appeared normal. SPECIMENS: ID Type Source Tests Collected by Time Destination 1 :  Lavage Lung, Right Upper Lobe Bronchoalveolar Lavage PULMONARY CYTOLOGY, BRONCHOALVEOLAR LAVAGE (BAL) DIFFERENTIAL Teresita Forde DO 11/29/2024  4:11 PM       Impression: Bronchoalveolar lavage was performed x3 in the right posterior segment (RB2) and the fluid appeared cloudy The vocal cords, trachea, main ramin, left main stem, left upper lobar bronchus, left apical-posterior segment (LB1+2), left upper anterior segment (LB3), lingular lobar bronchus, superior lingular segment (LB4), inferior lingular segment (LB5), left lower lobar bronchus, left superior segment (LB6), left anterior basal segment (LB7+8), left lateral basal segment (LB9), left posterior basal segment (LB10), right main stem, right upper lobar bronchus, right apical segment (RB1), right posterior segment (RB2), right anterior segment (RB3), bronchus intermedius, right middle lobar bronchus, right lateral segment (RB4), right medial segment (RB5), right lower lobar bronchus, right superior segment (RB6), right medial basal segment (RB7), right anterior basal segment (RB8), right lateral basal segment (RB9) and right posterior basal segment (RB10) appeared normal. RECOMMENDATION: Follow up culture results from Bronchoscopy      Procedure: XR chest  portable  Result Date: 11/29/2024  Narrative: XR CHEST PORTABLE INDICATION: Re-evaluate PE. COMPARISON: Chest radiograph 11/24/2024, 10/16/2024 and CT chest, abdomen pelvis 11/24/2024 FINDINGS: Slight worsening of right upper lobe airspace opacity, partially obscuring known right upper lobe mass. The left lung is grossly clear. No pneumothorax or pleural effusion. Normal cardiomediastinal silhouette. Bones are unremarkable for age. Normal upper abdomen.     Impression: Slight worsening of right upper lobe airspace opacity, partially obscuring known right upper lobe mass. Workstation performed: [id number]     Procedure: XR chest 2 views  Result Date: 11/25/2024  Narrative: XR CHEST PA AND LATERAL INDICATION: Cough, shortness of breath. COMPARISON: Chest radiograph 10/16/2024. CT PE study 11/24/2024. FINDINGS: Patchy consolidation within the peripheral right upper lobe. Redemonstration of the right upper lobe paramediastinal mass measuring 2.5 x 1.9 cm. No pneumothorax or pleural effusion. Normal cardiomediastinal silhouette. Bones are unremarkable for age. Normal upper abdomen.     Impression: Right upper lobe pneumonia. Redemonstration of the right upper lobe mass, better evaluated on the CT from 11/24/2024. Resident: Sanaz Corona I, the attending radiologist, have reviewed the images and agree with the final report above. Workstation performed: [id number]     Procedure: Echo complete w/ contrast if indicated  Result Date: 11/25/2024  Narrative: •  Left Ventricle: Left ventricular cavity size is normal. Wall thickness is normal. The left ventricular ejection fraction is 65%. Systolic function is normal. Wall motion is normal. Diastolic function is mildly abnormal, consistent with grade I (abnormal) relaxation. •  Mitral Valve: There is mild annular calcification. •  Tricuspid Valve: There is mild regurgitation.     Procedure: CT pe study w abdomen pelvis w contrast  Result Date: 11/25/2024  Narrative: CT  PULMONARY ANGIOGRAM OF THE CHEST AND CT ABDOMEN AND PELVIS WITH INTRAVENOUS CONTRAST INDICATION: elevated d-dimer, sob, r/o PE. HX Lung cancer with mets to brain. COMPARISON: CT 10/22/2024, 7/3/2024, PET/CT 3/22/2024 TECHNIQUE: CT examination of the chest, abdomen and pelvis was performed. Thin section CT angiographic technique was used in the chest in order to evaluate for pulmonary embolus and coronal 3D MIP postprocessing was performed on the acquisition scanner. Multiplanar 2D reformatted images were created from the source data. This examination, like all CT scans performed in the [facility], was performed utilizing techniques to minimize radiation dose exposure, including the use of iterative reconstruction and automated exposure control. Radiation dose length product (DLP) for this visit: 369 mGy-cm IV Contrast: 85 mL of iohexol (OMNIPAQUE) Enteric Contrast: Not administered. FINDINGS: CHEST PULMONARY ARTERIAL TREE: Embolism straddles between the middle lobe and right lower lobe basal segment arteries, as seen on series 301/112, this is new from the most recent prior. Additional embolism noted within the left lower lobe basal arteries straddling into the lower lingular segment, as noted on series 301/98, also new from prior. RV diameter at the level of the AV valve = 3. 9 cm LV diameter at the level of the AV valve = 2.7 cm Ratio equals 1.4 LUNGS: There are new patchy airspace opacities seen throughout the right upper lobe Spiculated right upper lobe nodule measures 2.5 x 1.9 cm, similar There is mild diffuse groundglass opacities throughout, likely representing a mild pulmonary edema, previously noted groundglass nodule is predominantly obscured Mild emphysema PLEURA: Unremarkable. HEART/AORTA: Heart is unremarkable for patient's age. No thoracic aortic aneurysm. MEDIASTINUM AND SUDEEP: Unremarkable. CHEST WALL AND LOWER NECK: Unremarkable. ABDOMEN LIVER/BILIARY TREE: Unremarkable.  GALLBLADDER: No calcified gallstones. No pericholecystic inflammatory change. SPLEEN: Unremarkable. PANCREAS: Unremarkable. ADRENAL GLANDS: Unremarkable. KIDNEYS/URETERS: 2 x 1.8 cm hypodense right upper pole lesion, series 306/30, with surrounding hypoenhancing parenchyma, similar to prior 0.6 cm ill-defined area of hypoenhancement within the posterior right lower pole series 306/58, stable from most recent prior 2 mm nonobstructing right upper pole stone STOMACH AND BOWEL: Unremarkable. APPENDIX: No findings to suggest appendicitis. ABDOMINOPELVIC CAVITY: No ascites. No pneumoperitoneum. No lymphadenopathy. VESSELS: Unremarkable for patient's age. PELVIS REPRODUCTIVE ORGANS: Unremarkable for patient's age. URINARY BLADDER: Unremarkable. ABDOMINAL WALL/INGUINAL REGIONS: 4 x 3.6 cm centrally hypodense collection within the right ischio rectal fossa, previously 6.3 cm BONES: No acute fracture or suspicious osseous lesion.     Impression: 1.  Bilateral pulmonary emboli 2.  The calculated ratio of right ventricular to left ventricular diameter (RV/LV ratio) is 1.4. This is greater than 0.9, which is abnormal and indicates right heart strain. An abnormal RV/LV ratio has been shown to be associated with an increased risk of 30 day mortality in the setting of acute pulmonary embolism. 3.  Right upper lobe opacities consistent with pneumonia 4.  Mild pulmonary edema 5.  Stable right upper lobe pulmonary mass 6.  Stable right renal lesion suspicious for metastasis 7.  Decrease in size of mass in the right ischio rectal fossa I personally discussed impression 1-3 with Fitz Burr at 11/25/24 12:44 AM Workstation performed: [id number]     Procedure: CT head without contrast  Result Date: 11/24/2024  Narrative: CT BRAIN - WITHOUT CONTRAST INDICATION:   fever tachycardia, hx brain mets. COMPARISON: CT brain dated October 20, 2024. MRI brain dated October 21, 2024. TECHNIQUE:  CT examination of the brain was performed.   Multiplanar 2D reformatted images were created from the source data. Radiation dose length product (DLP) for this visit:  1003 mGy-cm .  This examination, like all CT scans performed in the [facility], was performed utilizing techniques to minimize radiation dose exposure, including the use of iterative reconstruction and automated exposure control. IMAGE QUALITY:  Diagnostic. FINDINGS: PARENCHYMA: Again noted is extensive vasogenic edema at the left parietal and occipital lobes similar to the prior exam in this patient with a known metastatic lesion in this region, poorly visualized on this unenhanced study. There is similar mass effect on the posterior horn of the left lateral ventricle. No midline shift. There is mild periventricular white matter low attenuation which is nonspecific and most likely related to chronic small vessel ischemic changes. No acute intracranial hemorrhage or ischemia. VENTRICLES AND EXTRA-AXIAL SPACES: See above. No hydrocephalus. VISUALIZED ORBITS: Normal visualized orbits. PARANASAL SINUSES: Normal visualized paranasal sinuses. CALVARIUM AND EXTRACRANIAL SOFT TISSUES: Normal.     Impression: Reidentified vasogenic edema at the left parietal and left occipital lobe similar to the prior exam, attributed to the patient's history of brain metastases, poorly visualized on this unenhanced study. There is similar mass effect on the posterior horn of the left lateral ventricle. No midline shift. No acute intracranial abnormality. Mild chronic small vessel ischemic changes. Workstation performed: [id number]     Procedure: EEG Video Monitoring 24 Hour  Result Date: 10/26/2024  Narrative: Table formatting from the original result was not included. Continuous Video EEG Monitoring Patient Name:  Ayla Nye  MRN: [id number] :  1950 File #: [id number]  Age: 74 y.o. Ordering Provider: Hao Torrez MD  Study Start: 10/22/2024 0840 Study End: 10/22/2024 2317             Study type: Continuous video EEG ICD 10 diagnosis: Seizure R56.9 and Encephalopathy, unspecified G93.40  ------------------------------------------------- Patient History: This recording was observed in a 73 y.o. female with history of brain metastasis to evaluate for seizure as the cause of encephalopathy.  Medications include: Levetiracetam  ------------------------------------------------- Description of Procedure: 32 channel digital recording with electrodes placed according to the International 10-20 system with continuous video, ECG, EOG and the possible addition of T1/T2 electrodes. This study was monitored intermittently by a monitoring technologist.  The physician interpreting the study had access to the data throughout the recording.   The recording was technically satisfactory.  ------------------------------------------------- Results: Manual Review: During wakefulness there were runs of 10-11 Hz posteriorly dominant rhythm that attenuated with eye opening and was present nearly exclusively on the right. There were frontal low amplitude beta activities.  During drowsiness the posteriorly dominant rhythm attenuated and there were diffuse theta and beta activities. During sleep there were vertex waves and sleep spindles.  There were continuous left posterior quadrant low to medium amplitude polymorphic theta more than delta activities. There were higher amplitudes and enhanced fast activities at T5 and P3. There were T5/P3 sharp waves, often poorly formed. Initially there were frequent left posterior temporal / parietal poorly formed (blunt and at times sharply contoured) LPDs occurring at 0.5-1 Hz. Periodic discharges attenuated over the initial hours of recording. By 14:30 there were frequent sporadic sharp waves, but periodic discharges had resolved. By the end of recording left posterior sharp waves were occasional.  Other findings: Samples of the single channel ECG demonstrated a regular rhythm.   Events: No push buttons were activated.  ------------------------------------------------- Interpretation: This 14.5 hour continuous video-EEG recording is abnormal.  Continuous left posterior quadrant polymorphic theta/delta slowing indicates underlying focal neuronal dysfunction. A left posterior breach rhythm is consistent with an underlying skull defect. Initially, there were frequent left posterior temporal / parietal poorly formed (blunt and at times sharply contoured) lateralized periodic discharges (LPDs) occurring at 0.5-1 Hz. LPDs resolved after the first few hours of recording.  No seizures are present. This concludes 38.5 hours of continuous video EEG monitoring. No seizures were present. Hao Torrez MD  [facility] Diplomate, [facility]     Procedure: EEG Video Monitoring 24 Hour  Result Date: 10/23/2024  Narrative: Table formatting from the original result was not included. Continuous Video EEG Monitoring Patient Name:  Ayla Nye  MRN: [id number] :  1950 File #: [id number]  Age: 73 y.o. Ordering Provider: Wander Muller DO  Start/End: 10/21/2024 0229 to 10/21/2024 0330 and 10/21/2024 0839 to 10/22/2024 0839 No recording from 19:36 to 21:04 (MRI)            Study type: Continuous video EEG ICD 10 diagnosis: Seizure R56.9 and Encephalopathy, unspecified G93.40 ------------------------------------------------- Patient History: This recording was observed in a 73 y.o. female with history of brain metastasis to evaluate for seizure as the cause of encephalopathy. Medications include: Levetiracetam ------------------------------------------------- Description of Procedure: 32 channel digital recording with electrodes placed according to the International 10-20 system with continuous video, ECG, EOG and the possible addition of T1/T2 electrodes. This study was monitored intermittently by a monitoring technologist.  The physician interpreting the  study had access to the data throughout the recording.   The recording was technically satisfactory. ------------------------------------------------- Results: Manual Review: During wakefulness there were runs of 10-11 Hz posteriorly dominant rhythm that attenuated with eye opening and was present nearly exclusively on the right. There were frontal low amplitude beta activities. During drowsiness the posteriorly dominant rhythm attenuated and there were diffuse theta and beta activities. During sleep there were vertex waves and sleep spindles. There were continuous right posterior quadrant low to medium amplitude polymorphic theta more than delta activities. There were higher amplitudes and enhanced fast activities at T5 and P3. There were occasional T5/P3 sharp waves, often poorly formed. During the final  hours of recording there were frequent left posterior temporal / parietal poorly formed (blunt and at times sharply contoured) LPDs occurring at 0.5-1 Hz. Other findings: Samples of the single channel ECG demonstrated a regular rhythm. Events: No push buttons were activated. ------------------------------------------------- Interpretation: This 24 hour continuous video-EEG recording is abnormal. Continuous left posterior quadrant polymorphic theta/delta slowing indicates underlying focal neuronal dysfunction. A left posterior breach rhythm is consistent with an underlying skull defect. During the later portions of recording there were frequent left posterior temporal / parietal poorly formed (blunt and at times sharply contoured) lateralized periodic discharges (LPDs) occurring at 0.5-1 Hz. LPDs indicate high seizure risk and are often associated with acute/subacute destructive neuronal injury. No seizures are present. Hao Torrez MD  [facility] Diplomate, ABPN Neurology and Epilepsy     Procedure: CT chest abdomen pelvis w contrast  Result Date: 10/23/2024  Narrative: CT CHEST,  ABDOMEN AND PELVIS WITH IV CONTRAST INDICATION: Evaluate for further mets. History of lung cancer with metastases. COMPARISON: CT chest abdomen pelvis 7/3/2024, PET/CT 8/5/2024 TECHNIQUE: CT examination of the chest, abdomen and pelvis was performed. Multiplanar 2D reformatted images were created from the source data. Coronal MIP images of the chest were also provided. This examination, like all CT scans performed in the [facility], was performed utilizing techniques to minimize radiation dose exposure, including the use of iterative reconstruction and automated exposure control. Radiation dose length product (DLP) for this visit: 532.3 mGy-cm IV Contrast: 85 mL of iohexol (OMNIPAQUE) Enteric Contrast: Not administered. FINDINGS: CHEST LUNGS: 2.5 x 1.8 x 1.9 cm irregular, necrotic right upper lobe medial juxtapleural mass (6/38 and 601/43) has decreased in size from previous study, previously 4.6 x 4.8 x 3.3 cm by report. A 7 x 5 mm spiculated nodule posterior to the dominant mass is again noted which appears similar (6/41). Another previously described satellite nodule anterior and inferior to the dominant mass in the right lobe previously measuring up to 1.4 cm currently measures about 0.8 x 0.4 cm (6/50. There is some linear scarring or atelectasis also noted anteriorly and medially from the dominant mass toward the pleura. New 8 mm right lower lobe nodule (6/94). New 5 mm left lower lobe nodule (6/93). Stable 2.4 cm groundglass opacity anterior left upper lobe (6/43). Mild background emphysema. Mild scattered linear atelectasis or scarring in the lungs. AIRWAYS: No significant filling defect. PLEURA: No pleural effusion. Stable right apical pleural-parenchymal scarring. Pleural calcification posterior left lower lobe again noted. HEART/GREAT VESSELS: Heart is unremarkable for patient's age. Atherosclerotic changes thoracic aorta and coronary arteries. No thoracic aortic aneurysm. MEDIASTINUM AND  SUDEEP: -Right paratracheal lymph node measuring 1.0 x 0.7 cm (2/44), previously 2.4 x 2.2 cm. -1.2 x 0.8 cm precarinal lymph node (2/50), significantly decreased from previous diagnostic study in July 2024, difficult to directly compare to previous size on noncontrast PET CT but also possibly slightly smaller. Some FDG avidity was present in this  region on the prior PET study. -Right hilar lymph node seen on the prior diagnostic CT measures about 6 mm short axis (2/56), previously about 1.7 cm when measured in a similar fashion and is of lower density currently. -4 mm short axis subcarinal lymph node, previously measured about 9 mm. The esophagus is unremarkable. CHEST WALL AND LOWER NECK: Subcentimeter bilateral thyroid nodules. Incidental discovery of one or more thyroid nodule(s) measuring less than 1.5 cm and without suspicious features is noted in this patient who is above 35 years old; according to guidelines published in the February 2015 white paper on incidental thyroid nodules in the Journal of the American College of Radiology (JACR), no further evaluation is recommended. ABDOMEN LIVER/BILIARY TREE: Mild hepatomegaly. Tiny hypodensity in the left hepatic lobe (2/107), stable in retrospect. Mild prominence of the common hepatic duct with tapering of the distal CBD, may be age-related. GALLBLADDER: No calcified gallstones. No pericholecystic inflammatory change. SPLEEN: Unremarkable. PANCREAS: Unremarkable. ADRENAL GLANDS: Unremarkable. KIDNEYS/URETERS: There is an ill-defined 2.2 x 1.8 cm hypodensity within the medial upper pole right kidney which is new from the previous CT study and not clearly detectable on prior PET/CT either. There is no significant perinephric fat stranding or elevated white blood cell count to suggest pyelonephritis, therefore this is suspicious for metastasis. There is also a 6 mm hypodensity in the posterior lower pole right kidney, barely if at all perceptible on the prior  contrast-enhanced CT study. Although this could correspond to enlargement of a complex subcentimeter cyst (as there are several additional small cysts in the right kidney), additional metastasis is not excluded. Tiny nonobstructing calculi right kidney measuring up to 2 mm. Several subcentimeter left renal hypodensities are too small to characterize, unchanged from prior study. No hydronephrosis on either side. STOMACH AND BOWEL: Colonic diverticulosis without findings of acute diverticulitis. APPENDIX: No findings to suggest appendicitis. ABDOMINOPELVIC CAVITY: No diffuse ascites. Trace presacral edema which is new. No pneumoperitoneum. No lymphadenopathy. VESSELS: Atherosclerotic changes abdominal aorta without evidence of aneurysm. PELVIS REPRODUCTIVE ORGANS: Unremarkable for patient's age. URINARY BLADDER: Tiny focus of gas within the urinary bladder, presumably related to recent catheterization. Correlate clinically. ABDOMINAL WALL/INGUINAL REGIONS: Enlarging 6.3 x 4.0 x 5.0 cm irregular rim-enhancing mass with central low attenuation in the right ischiorectal fossa abutting the anteromedial margin of the right gluteus britt muscle, the adjacent obturator internus as well as the right levator ani muscle but without discrete muscle invasion of these structures. This was subcentimeter in size on previous CT study from July in retrospect, measured about 2.7 x 2.0 cm on the previous PET/CT and was FDG avid. This almost certainly represents significant enlargement of a necrotic metastasis given interval enlargement over recent subsequent study and could be easily verified with percutaneous biopsy if necessary. BONES: No acute fracture or suspicious osseous lesion.     Impression: 1. New small bilateral pulmonary nodules, likely metastases. 2. New right renal lesion(s) concerning for metastasis. 3. Significant interval enlargement of necrotic appearing mass in the right ischiorectal fossa, likely metastasis. 4.  Decreasing right upper lobe necrotic mass with stable and/or decreasing satellite nodularity. 5. Improved mediastinal and right hilar adenopathy. 6. Stable 2.4 cm left upper lobe groundglass density. Workstation performed: [id number]     Procedure: MRI brain seizure wo and w contrast  Result Date: 10/22/2024  Narrative: MRI  BRAIN  - WITH AND WITHOUT CONTRAST, SEIZURE PROTOCOL INDICATION: seizure activity.   Lung cancer with brain mets status post SRS. COMPARISON: CT from yesterday. MRI brain dated 10/8/2024. TECHNIQUE:  Multiplanar, multisequence imaging of the brain was performed before and after gadolinium administration. IV Contrast:  5 mL of Gadobutrol injection (SINGLE-DOSE) IMAGE QUALITY:   Diagnostic. FINDINGS: BRAIN PARENCHYMA: Redemonstration of multiple enhancing metastatic lesions. Left occipital lobe metastasis measuring 2.3 cm (image 15 series 12). Maximal longitudinal measurement is minimally increased but there is increased central necrosis within the anterior aspect of the lesion. Peripheral restricted diffusion in the posterior aspect of the lesion is unchanged Stable 6 mm lesion in the paramedian left occipital lobe on image 19 series 12. Punctate enhancing lesion in the left temporal lobe image 10 series 12 is mildly decreased in conspicuity. Stable punctate enhancing in the cerebellar vermis (image 7 series 12. Stable 3 mm enhancing lesion in the paramedian left frontal lobe on image 17 series 12. Previously described enhancement along the 7th nerves within the internal auditory canals is not significantly changed No new masses or abnormal enhancement. Stable left parieto-occipital edema with mass effect on the atria and occipital horns of the left lateral ventricle. No shift or herniation. No acute infarct or hemorrhage. Stable T2/FLAIR hyperintensities in the periventricular and subcortical matter likely due to mild chronic microangiopathy. VENTRICLES: No hydrocephalus. SELLA AND PITUITARY  GLAND:  Normal. ORBITS:  Normal. PARANASAL SINUSES:  Normal. VASCULATURE:  Evaluation of the major intracranial vasculature demonstrates appropriate flow voids. CALVARIUM AND SKULL BASE: Stable heterogeneous marrow signal. EXTRACRANIAL SOFT TISSUES: Unremarkable     Impression: Redemonstration of intracranial metastatic lesions 2.3 cm enhancing lesion at the left occipital lobe is slightly increased in maximum dimension with increased central necrosis at the anterior margin that may be partly due to radiation necrosis. Additional smaller metastases are stable or mildly decreased in size. Stable left parieto-occipital edema with mass effect on the left lateral ventricle. No new metastases. No acute infarct or hemorrhage Workstation performed: [id number]     Procedure: CT stroke alert brain  Result Date: 10/20/2024  Narrative: CT BRAIN - STROKE ALERT PROTOCOL INDICATION:   Stroke Alert. COMPARISON:  None. TECHNIQUE:  CT examination of the brain was performed.  In addition to axial images, coronal reformatted images were created and submitted for interpretation. Radiation dose length product (DLP) for this visit:  1003 mGy-cm .  This examination, like all CT scans performed in the [facility], was performed utilizing techniques to minimize radiation dose exposure, including the use of iterative reconstruction and automated exposure control. IMAGE QUALITY:  Diagnostic. FINDINGS: PARENCHYMA: Vasogenic edema in the left parietal and superior temporal region. Periventricular and subcortical hypoattenuating foci suggesting underlying microangiopathic disease. No intracranial hemorrhage. Mass effect in the left mesial temporal and parietal region without midline shift. VENTRICLES AND EXTRA-AXIAL SPACES: Partial effacement of the left temporal horn. No hydrocephalus. VISUALIZED ORBITS: Intact. PARANASAL SINUSES: Clear. CALVARIUM AND EXTRACRANIAL SOFT TISSUES:   No lytic or blastic lesion.     Impression:  Stable vasogenic edema in the left parietal temporal region. Previously demonstrated left posterior parietal lesion is less well-visualized likely due to differences in technique. Findings were directly discussed with Liang Luong at approximately  7:10 PM  . Workstation performed: [id number]     Procedure: CTA stroke alert (head/neck)  Result Date: 10/20/2024  Narrative: CTA NECK AND BRAIN WITH AND WITHOUT CONTRAST INDICATION: Stroke Alert COMPARISON: 2/28/2024, 9/9/2024 and 10/8/2024. TECHNIQUE:  Post contrast imaging was performed after administration of iodinated contrast through the neck and brain. Post contrast axial 0.625 mm images timed to opacify the arterial system.  3D rendering was performed on an independent workstation.   MIP reconstructions performed. Coronal and sagittal reconstructions were performed of the non contrast portion of the brain. Radiation dose length product (DLP) for this visit:  1126 mGy-cm .  This examination, like all CT scans performed in the [facility], was performed utilizing techniques to minimize radiation dose exposure, including the use of iterative reconstruction and automated exposure control. IV Contrast:  70 mL of iohexol (OMNIPAQUE) IMAGE QUALITY:   Diagnostic FINDINGS: CTA NECK ARCH AND GREAT VESSELS: No significant plaque in the aortic arch. No stenosis in the subclavian arteries. VERTEBRAL ARTERIES: Left vertebral artery arises directly from the aortic arch. No stenosis or dissection. RIGHT CAROTID: No stenosis.    No dissection. LEFT CAROTID: No stenosis.    No dissection. NASCET criteria was used to determine the degree of internal carotid artery diameter stenosis. CTA BRAIN: INTERNAL CAROTID ARTERIES: No stenosis or occlusion. ANTERIOR CEREBRAL ARTERY CIRCULATION:  No stenosis or occlusion. MIDDLE CEREBRAL ARTERY CIRCULATION:  No stenosis or occlusion. DISTAL VERTEBRAL ARTERIES:  No stenosis or occlusion. BASILAR ARTERY:  No stenosis or occlusion.  POSTERIOR CEREBRAL ARTERIES: No stenosis or occlusion. VENOUS STRUCTURES: Patent dural venous sinuses. NON VASCULAR ANATOMY BONY STRUCTURES:  No lytic or blastic lesion. SOFT TISSUES OF THE NECK: No mass or lymphadenopathy. THORACIC INLET: Right upper lobe spiculated 3.4 cm nodule. Not significantly changed since the previous exam.     Impression: No hemodynamically significant stenosis, dissection or occlusion of the carotid or vertebral arteries or major vessels of the [place]. Findings were directly discussed with Liang Luong at  7:13 PM  . Workstation performed: [id number]     Procedure: XR chest 1 view portable  Result Date: 10/17/2024  Narrative: XR CHEST PORTABLE INDICATION: dizziness. Right upper lobe lung cancer COMPARISON: Chest radiograph 7/3/2024, PET/CT 8/5/2024 FINDINGS: Monitoring leads and clips project over the chest. Right upper lobe paramediastinal mass with associated opacity has slightly improved. No infiltrates. No pneumothorax or pleural effusion. Normal cardiomediastinal silhouette. Bones are unremarkable for age. Normal upper abdomen.     Impression: Right upper lobe paramediastinal mass has slightly improved. No active disease. Resident: Reg Head I, the attending radiologist, have reviewed the images and agree with the final report above. Workstation performed: [id number]     Procedure: CT head without contrast  Result Date: 10/16/2024  Narrative: CT BRAIN - WITHOUT CONTRAST INDICATION:   R lower quadrantanopsia. COMPARISON: Recent brain MRI 10/8/2024 TECHNIQUE:  CT examination of the brain was performed.  Multiplanar 2D reformatted images were created from the source data. Radiation dose length product (DLP) for this visit:  946 mGy-cm .  This examination, like all CT scans performed in the [facility], was performed utilizing techniques to minimize radiation dose exposure, including the use of iterative reconstruction and automated exposure control. IMAGE  QUALITY:  Diagnostic. FINDINGS: PARENCHYMA: There is redemonstration of vasogenic edema involving the left parietal occipital lobes in this patient with known intracranial metastatic disease status post treatment. There is otherwise no definite evidence for new areas of vasogenic edema. There is no CT evidence for large acute vascular distribution infarct. There is local mass effect on the posterior body and occipital horn of the left lateral ventricle with otherwise no significant midline shift. Basilar cisterns are patent. No acute intracranial hemorrhage. VENTRICLES AND EXTRA-AXIAL SPACES: No hydrocephalus. VISUALIZED ORBITS: Normal visualized orbits. PARANASAL SINUSES: Normal visualized paranasal sinuses. CALVARIUM AND EXTRACRANIAL SOFT TISSUES: Normal.     Impression: Redemonstration of left parieto-occipital vasogenic edema in this patient with known intracranial metastatic disease status post treatment. Correlate with recent brain MRI performed 10/8/2024. Otherwise no CT evidence for a new large acute vascular description infarct or acute intracranial hemorrhage. Workstation performed: [id number]     Procedure: MRI Brain BT w wo Contrast  Result Date: 10/9/2024  Narrative: MRI BRAIN WITH AND WITHOUT CONTRAST INDICATION: C79.31: Secondary malignant neoplasm of brain C34.91: Malignant neoplasm of unspecified part of right bronchus or lung. post SRS, brain metastasis COMPARISON: MRI brain BT with and without contrast 9/10/2024. CTA head and neck with and without contrast 9/9/2024. CT head without contrast 8/21/2024 MRI brain with and without contrast 7/30/2024, 3/28/2024. TECHNIQUE: Multiplanar, multisequence imaging of the brain and sella was performed before and after gadolinium administration. IV Contrast:  10 mL of Gadobutrol injection (SINGLE-DOSE) IMAGE QUALITY:   Diagnostic. FINDINGS: BRAIN PARENCHYMA: Multiple enhancing metastatic lesions as detailed below with comparison made to MRI brain with and  without contrast 9/10/2024: - Tiny left anterolateral frontal lobe lesion (13:112), unchanged. - 0.6 cm left paramedian parietal lobe lesion (13:98), unchanged. - 0.3 cm left paramedian frontal lobe lesion (13:90), previously 0.4 cm. - 2.2 cm left occipital lobe lesion with central necrosis (13:75), likely due to treatment related changes of radiation necrosis, unchanged. - 0.4 cm left temporal lobe lesion (13:51), previously 0.3 cm. - Tiny cerebellar vermis lesion (13:41), previously 0.4 cm. Unchanged enhancement in bilateral cranial nerve VII and both internal auditory canals, suspicious for leptomeningeal metastasis. No new enhancing lesion. Persistent diffuse perilesional vasogenic edema in left parietal lobe and left occipital lobe with mild mass effect on posterior aspect of left lateral ventricle. No midline shift. No acute intracranial hemorrhage. No diffusion weighted signal abnormality to suggest acute infarction. No abnormal hypoperfusion. Small scattered hyperintensities on T2/FLAIR imaging are noted in the periventricular and subcortical white matter demonstrating an appearance that is statistically most likely to represent mild microangiopathic change. VENTRICLES: Mild mass effect on posterior aspect of left lateral ventricle. No obstructive hydrocephalus. No acute intraventricular hemorrhage. SELLA AND PITUITARY GLAND:  Normal. ORBITS:  Normal. PARANASAL SINUSES:  Normal. VASCULATURE:  Evaluation of the major intracranial vasculature demonstrates appropriate flow voids. CALVARIUM AND SKULL BASE:  Normal. EXTRACRANIAL SOFT TISSUES:  Normal.     Impression: Mixed treatment response since MRI brain 9/10/2024 - Slightly increase size of left temporal lobe metastatic lesion. - Unchanged left occipital lobe metastatic lesion with evidence of radiation necrosis, left paramedian parietal lobe lesion, and tiny left anterolateral lobe frontal lobe lesion. - Unchanged enhancement in bilateral cranial nerve VII  and both internal auditory canals, suspicious for leptomeningeal metastasis. - Decreased size of left paramedian frontal lobe lesion and tiny cerebellar vermis lesion. - No new enhancing intracranial metastasis. Persistent diffuse perilesional vasogenic edema in left parietal lobe and left occipital lobe with mild mass effect on posterior aspect of left lateral ventricle. The study was marked in EPIC for significant notification. Workstation performed: [id number]     Procedure: IR biopsy other  Result Date: 9/13/2024  Narrative: PROCEDURE: 1.  Ultrasound-guided right gluteal mass biopsy 2.  Fluoroscopic-guided diagnostic lumbar puncture STAFF: Greg Birch M.D. NUMBER OF IMAGES: Multiple. COMPLICATIONS: None. MEDICATIONS: Moderate sedation was monitored by radiology nursing staff.  Monitoring of conscious sedation totaled 15 minutes. INDICATION: Metastatic lung cancer. New right gluteal mass. 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 PROCEDURE: Under real-time ultrasound guidance, a 17-gauge coaxial needle was advanced into the right gluteal mass. Under ultrasound guidance, a total of 3 2.3 cm. core biopsies were obtained. The tract with embolized with Gelfoam, and the coaxial needle was removed. Next, a suitable location for puncture was identified with fluoroscopy in the prone position at the L2-3 level. A 20-gauge spinal needle was advanced into the subarachnoid space using fluoroscopic guidance.  An opening pressure was measured.  16 mL's of clear cerebrospinal fluid was then removed and sent for analysis.  The needle was removed and the puncture site was covered with a sterile dressing. FINDINGS: 1.  Pre-procedural ultrasound showed the approximately 3 cm superficial right gluteal mass. 2.  Intra-procedural ultrasound confirmed good positioning of the biopsy needle within the mass. 3.  Samples were sent in formalin. 4.  Post-procedural ultrasound showed no immediate complication. 5.  Fluoroscopy confirmed  good positioning within the subarachnoid space, with prompt return of cerebrospinal fluid. 6.  Opening pressure was measured at 15 cm H20.     Impression: 1.  Ultrasound-guided right gluteal mass biopsy. 2.  Fluoroscopic-guided lumbar puncture. Workstation performed: [id number]     Procedure: IR lumbar puncture  Result Date: 9/13/2024  Narrative: PROCEDURE: 1.  Ultrasound-guided right gluteal mass biopsy 2.  Fluoroscopic-guided diagnostic lumbar puncture STAFF: Greg Birch M.D. NUMBER OF IMAGES: Multiple. COMPLICATIONS: None. MEDICATIONS: Moderate sedation was monitored by radiology nursing staff.  Monitoring of conscious sedation totaled 15 minutes. INDICATION: Metastatic lung cancer. New right gluteal mass. 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 PROCEDURE: Under real-time ultrasound guidance, a 17-gauge coaxial needle was advanced into the right gluteal mass. Under ultrasound guidance, a total of 3 2.3 cm. core biopsies were obtained. The tract with embolized with Gelfoam, and the coaxial needle was removed. Next, a suitable location for puncture was identified with fluoroscopy in the prone position at the L2-3 level. A 20-gauge spinal needle was advanced into the subarachnoid space using fluoroscopic guidance.  An opening pressure was measured.  16 mL's of clear cerebrospinal fluid was then removed and sent for analysis.  The needle was removed and the puncture site was covered with a sterile dressing. FINDINGS: 1.  Pre-procedural ultrasound showed the approximately 3 cm superficial right gluteal mass. 2.  Intra-procedural ultrasound confirmed good positioning of the biopsy needle within the mass. 3.  Samples were sent in formalin. 4.  Post-procedural ultrasound showed no immediate complication. 5.  Fluoroscopy confirmed good positioning within the subarachnoid space, with prompt return of cerebrospinal fluid. 6.  Opening pressure was measured at 15 cm H20.     Impression: 1.  Ultrasound-guided right  gluteal mass biopsy. 2.  Fluoroscopic-guided lumbar puncture. Workstation performed: [id number]     Procedure: MRI Brain BT w wo Contrast  Result Date: 9/11/2024  Narrative: MRI BRAIN WITH AND WITHOUT CONTRAST INDICATION: mets. COMPARISON: CTA head and neck with and without contrast 9/9/2024. CT head without contrast 8/21/2024. MRI brain with and without contrast 7/30/2024, 3/28/2024. MRI brain without contrast 2/10/2015. TECHNIQUE: Multiplanar, multisequence imaging of the brain and sella was performed before and after gadolinium administration. IV Contrast:  10 mL of Gadobutrol injection (SINGLE-DOSE) IMAGE QUALITY:   Diagnostic. FINDINGS: BRAIN PARENCHYMA: Multiple enhancing metastatic lesions. For reference (measured on long axis): - Tiny left anterolateral frontal lobe lesion (13:14), previously 0.3 cm. - 0.6 cm left paramedian parietal lobe lesion (13:97), unchanged. - 0.4 cm left paramedian frontal lobe lesion (13:93), previously 0.6 cm ring-enhancing lesion. - 2.2 cm left occipital lobe lesion (13:74), unchanged. - 0.3 cm left temporal lobe lesion (13:53), new. - 0.4 cm cerebellar vermis ring-enhancing lesion (13:39), previously 0.5 cm. Similar nearly symmetric enhancement in bilateral cranial nerve VII in both internal auditory canals, suspicious for leptomeningeal metastasis. Persistent diffuse vasogenic edema in left parietal, left occipital, and left posterior temporal lobes with mild mass effect on posterior aspect of left lateral ventricle, similar to recent CT heads but worsened since MRI brain 7/30/2024. No midline shift. No acute intracranial hemorrhage. No diffusion weighted signal abnormality to suggest acute infarction. No abnormal hyperperfusion. Small scattered hyperintensities on T2/FLAIR imaging are noted in the periventricular and subcortical white matter demonstrating an appearance that is statistically most likely to represent mild microangiopathic change. VENTRICLES: Mild mass effect on  posterior aspect of the left lateral ventricle. No obstructive hydrocephalus. No acute intraventricular hemorrhage. SELLA AND PITUITARY GLAND:  Normal. ORBITS:  Normal. PARANASAL SINUSES:  Normal. VASCULATURE:  Evaluation of the major intracranial vasculature demonstrates appropriate flow voids. CALVARIUM AND SKULL BASE:  Normal. EXTRACRANIAL SOFT TISSUES:  Normal.     Impression: Mixed treatment response. - New tiny left temporal lobe metastatic lesion. - Unchanged left paramedian parietal lobe and left occipital lobe metastatic lesions - largest in left occipital lobe. - Unchanged nearly symmetric enhancement along the course of bilateral cranial nerve VII in both internal auditory canals, suspicious for leptomeningeal metastasis. Differential includes bilateral Bell's palsy. - Slightly decreased size of left anterolateral frontal lobe, left paramedian frontal lobe, and cerebellar vermis metastatic lesions. - Persistent diffuse vasogenic edema in left parietal, left occipital, and left posterior temporal lobes with mild mass effect on posterior aspect of left lateral ventricle, similar to recent CT heads but worsened since MRI brain 7/30/2024. The study was marked in EPIC for immediate notification. Workstation performed: [id number]     Procedure: CTA head and neck with and without contrast  Result Date: 9/9/2024  Narrative: CTA NECK AND BRAIN WITH AND WITHOUT CONTRAST INDICATION: Known brain mets, confusion/disorientation over the past 2 days, known visual field deficits in right inferior visual field, RUE finger to nose abnormal COMPARISON:   CT brain August 21, 2024. Prior CT angiogram July 29, 2024. TECHNIQUE:  Routine CT imaging of the Brain without contrast.Post contrast imaging was performed after administration of iodinated contrast through the neck and brain. Post contrast axial 0.625 mm images timed to opacify the arterial system.  3D rendering was performed on an independent workstation.   MIP  reconstructions performed. Coronal and sagittal reconstructions were performed of the non contrast portion of the brain. Radiation dose length product (DLP) for this visit:  2108 mGy-cm .  This examination, like all CT scans performed in the [facility], was performed utilizing techniques to minimize radiation dose exposure, including the use of iterative reconstruction and automated exposure control. IV Contrast:  85 mL of iohexol (OMNIPAQUE) IMAGE QUALITY:   Diagnostic FINDINGS: NONCONTRAST BRAIN PARENCHYMA: Vasogenic edema identified in the left parietal and occipital lobes similar to the prior study. This is secondary to known metastatic disease. Mass effect on the posterior horn of left lateral ventricle noted. Low-density in periventricular white matter compatible with mild chronic microangiopathic change. VENTRICLES AND EXTRA-AXIAL SPACES: As above. No hydrocephalus. VISUALIZED ORBITS: Normal. PARANASAL SINUSES: Normal. CTA NECK ARCH AND GREAT VESSELS: Visualized arch and great vessels are normal. VERTEBRAL ARTERIES: Patent extracranial segments. RIGHT CAROTID: No stenosis.    No dissection. LEFT CAROTID: No stenosis.    No dissection. NASCET criteria was used to determine the degree of internal carotid artery diameter stenosis. CTA BRAIN: INTERNAL CAROTID ARTERIES: No stenosis or occlusion. ANTERIOR CEREBRAL ARTERY CIRCULATION:  No stenosis or occlusion. MIDDLE CEREBRAL ARTERY CIRCULATION:  No stenosis or occlusion. DISTAL VERTEBRAL ARTERIES:  No stenosis or occlusion. BASILAR ARTERY:  No stenosis or occlusion. POSTERIOR CEREBRAL ARTERIES: No stenosis or occlusion. VENOUS STRUCTURES:  Normal. NON VASCULAR ANATOMY BONY STRUCTURES:  No acute osseous abnormality. SOFT TISSUES OF THE NECK:  Normal. THORACIC INLET: Right upper lobe lung mass known to represent bronchogenic carcinoma.     Impression: CT Brain: Vasogenic edema left parietal and left occipital lobes similar to prior study in  attributed to history of brain metastasis. Mass effect on the posterior aspect of the left lateral ventricle unchanged. CT Angiography:  Unremarkable CTA neck and brain. Workstation performed: [id number]     Procedure: MRI pelvis bony wo and w contrast  Result Date: 9/3/2024  Narrative: MRI BONY PELVIS WITH AND WITHOUT CONTRAST INDICATION:   C34.91: Malignant neoplasm of unspecified part of right bronchus or lung. FDG avid lesion in the right ischio anal fat seen on prior PET/CT. Follow-up for further characterization. Patient has history of non-small cell lung carcinoma, metastatic to mediastinal and hilar lymph nodes. COMPARISON: PET/CT dated August 5, 2024, CT chest abdomen pelvis dated July 3, 2024. TECHNIQUE:  Multiplanar/multisequence MR was obtained of the entire pelvis both pre and post IV contrast. IV Contrast:  4 mL of Gadobutrol injection (SINGLE-DOSE) FINDINGS: SUBCUTANEOUS TISSUES: Normal PELVIC SOFT TISSUES: Soft tissue mass in the right ischioanal fat measuring 3.8 x 2.6 x 3.4 cm (5/33, 2/11), corresponding to the FDG avid lesion seen on prior PET/CT. It has hyperintense T2 weighted and hypointense T1-weighted signal with thick nodular and irregular enhancement predominantly along its periphery with hypoenhancement at the center. Lesion size and has increased in size from prior PET CT dated 8/5/2024 at which time it measured 2.7 x 2.0 cm and CT study dated 7/3/2024 at which time it measured 0.9 x 0.7 cm. Overall findings are suspicious for a malignant lesion, likely metastasis from patient's cancer. BONES:  No fracture, dislocation or destructive osseous lesion. ARTICULAR SURFACES:  Normal. VISUALIZED MUSCULATURE:  Unremarkable.     Impression: Enhancing soft tissue lesion in the right ischioanal fat measuring 3.8 x 2.6 x 3.4 cm (FDG avid on prior PET/CT), increased in size from 8/5/2024 and 7/3/2024. Findings are suspicious for a malignant lesion, likely a metastasis from patient's lung cancer.  Consider definitive characterization with tissue sampling. Workstation performed: [id number]     Procedure: XR elbow 3+ vw left  Result Date: 8/27/2024  Narrative: [id number]    Procedure: XR ankle 3+ vw right  Result Date: 8/27/2024  Narrative: [id number]    Procedure: CT head without contrast  Result Date: 8/21/2024  Narrative: CT BRAIN - WITHOUT CONTRAST INDICATION:   History of lung cancer with metastatic brain lesions, dysmetria. COMPARISON: Head CT from 8/5/2024, prior brain MR from 7/30/2024, and prior head CT from 7/29/2024 TECHNIQUE:  CT examination of the brain was performed.  Multiplanar 2D reformatted images were created from the source data. Radiation dose length product (DLP) for this visit:  1003 mGy-cm .  This examination, like all CT scans performed in the [facility], was performed utilizing techniques to minimize radiation dose exposure, including the use of iterative reconstruction and automated exposure control. IMAGE QUALITY:  Diagnostic. FINDINGS: PARENCHYMA: There is worsening vasogenic edema involving the left parietal, and occipital lobes, related to the left occipital enhancing metastatic lesion better depicted on prior brain MRI. There is also worsening low attenuating vasogenic edema involving the subcortical parafalcine left frontal lobe, corresponding to the metastatic lesion in this region identified on prior brain MRI. No associated intraparenchymal hemorrhage is noted. No midline shift is noted. There are additional periventricular white matter low-attenuation areas involving both cerebral hemispheres, which statistically like represent mild chronic microangiopathic changes. VENTRICLES AND EXTRA-AXIAL SPACES: There is mass effect on the posterior body and trigone of the left lateral ventricle due to surrounding vasogenic edema. VISUALIZED ORBITS: Normal visualized orbits. PARANASAL SINUSES:  "Normal visualized paranasal sinuses. CALVARIUM AND EXTRACRANIAL SOFT TISSUES:  Normal.     Impression: 1. Worsening vasogenic edema in particular involving the left parietal and occipital lobes, corresponding to worsening vasogenic edema surrounding the patient's known left occipital lobe metastasis as better depicted on prior brain MRI. This results in mass effect on the posterior body and trigone of left lateral ventricle.. 2. Worsening vasogenic edema involving the parafalcine left frontal lobe at the site of the patient's known intracranial metastasis in this region as seen on prior brain MRI. 3. No intracranial hemorrhage noted. No midline shift. The study was marked in EPIC for immediate notification. Workstation performed: [id number]       30 minutes total time spent on 12/11/2024 in caring for this patient including obtaining/reviewing history, symptom assessment and management, medication review / adjustment, psychosocial support, reviewing / ordering tests, medicines, imaging, procedures, supportive listening, anticipatory guidance, patient/family education, instructions for management, importance of adhering to treatment plan, risks/benefits of treatment(s), risk factor reduction, and documenting in the medical record. All of the patient's questions were answered during this discussion.      ZULEYMA Pollard  [facility]  [phone]    Portions of this document may have been created using dictation software and as such some \"sound alike\" terms may have been generated by the system. Do not hesitate to contact me with any questions or clarifications.     "

## 2024-12-11 NOTE — ASSESSMENT & PLAN NOTE
Current regimen:  Gabapentin 100mg AM, 300mg Bedtime  Tylenol 650mg PRN  Heating pad during the day  Failed therapies:  Celebrex 200mg BID  Oxycodone- felt like she had a headache from the medication  Would like to avoid opiates  Bowel regimen to prevent OIC:  Senna

## 2024-12-11 NOTE — PROGRESS NOTES
Ayla Nye was seen in the home today.  Patient was seen unaccompanied. Palliative CRNP present for visit. Patient  provided all pertinent information today.      Symptom Management    ESAS Scale - Please rate from 0-10 the degree to which you experience the following symptoms (0 being none to 10 being the worst):  Pain - 0  Tired - 6  Drowsiness - 1  Nausea - 1  Appetite - 0  Shortness of Breath - 2 - on exertion walking up steps  Depression - 0  Anxiety - 0  Wellbeing - 3  Sleeping - 6                             11. Bowel movements - 1    Ayla Nye 's medications were verified today with Ayla. Ayla has help to manage her medications every Sunday with a patient advocate, Kavya. Ayla and Kavya are managing the medications. All medications are up to date. Patient has no refills.    Allergies verified. No new allergies.     All medical, surgical, family and social history were reviewed with Ayla. There are no updates to patient's history.    All questions and concerns were addressed.  Patient was appreciative of the visit.    Next visit with RN and provider in 4 weeks.

## 2024-12-11 NOTE — ASSESSMENT & PLAN NOTE
Ayla follows with palliative care for psychosocial support and symptom management of lung cancer with brain mets, perianal tumor   Symptoms - pain well controlled.    ACP -  on file   RTC - 4 weeks    Lives alone.   Has two daughters .   Ex- is supportive and involved in care  Has brother that lives close by that helps as well.   Daughters are getting a life alert set up for patient.     No longer drives.

## 2024-12-11 NOTE — ASSESSMENT & PLAN NOTE
Follows with Dr. Castro.   Completed SRS  Immunotherapy discontinued- plan is to initiate chemo therapy.   On prednisone taper - managed by Dr. Castro.

## 2024-12-12 ENCOUNTER — OFFICE VISIT (OUTPATIENT)
Age: 74
End: 2024-12-12
Payer: MEDICARE

## 2024-12-12 VITALS
HEART RATE: 76 BPM | BODY MASS INDEX: 21.86 KG/M2 | RESPIRATION RATE: 18 BRPM | SYSTOLIC BLOOD PRESSURE: 124 MMHG | HEIGHT: 62 IN | DIASTOLIC BLOOD PRESSURE: 80 MMHG | OXYGEN SATURATION: 99 % | WEIGHT: 118.8 LBS | TEMPERATURE: 98 F

## 2024-12-12 DIAGNOSIS — I26.99 ACUTE PULMONARY EMBOLISM, UNSPECIFIED PULMONARY EMBOLISM TYPE, UNSPECIFIED WHETHER ACUTE COR PULMONALE PRESENT (HCC): Primary | ICD-10-CM

## 2024-12-12 DIAGNOSIS — E78.2 MIXED HYPERLIPIDEMIA: ICD-10-CM

## 2024-12-12 DIAGNOSIS — B59 PNEUMONIA OF RIGHT UPPER LOBE DUE TO PNEUMOCYSTIS JIROVECII (HCC): ICD-10-CM

## 2024-12-12 DIAGNOSIS — E03.9 ACQUIRED HYPOTHYROIDISM: ICD-10-CM

## 2024-12-12 DIAGNOSIS — I10 PRIMARY HYPERTENSION: ICD-10-CM

## 2024-12-12 DIAGNOSIS — C79.31 METASTATIC CANCER TO BRAIN (HCC): ICD-10-CM

## 2024-12-12 PROCEDURE — 99496 TRANSJ CARE MGMT HIGH F2F 7D: CPT

## 2024-12-12 RX ORDER — PANTOPRAZOLE SODIUM 40 MG/1
40 TABLET, DELAYED RELEASE ORAL
Qty: 30 TABLET | Refills: 5 | Status: SHIPPED | OUTPATIENT
Start: 2024-12-12 | End: 2025-01-11

## 2024-12-12 RX ORDER — AMLODIPINE BESYLATE 5 MG/1
5 TABLET ORAL DAILY
Qty: 30 TABLET | Refills: 0 | Status: SHIPPED | OUTPATIENT
Start: 2024-12-12 | End: 2025-01-11

## 2024-12-12 RX ORDER — LEVOTHYROXINE SODIUM 50 UG/1
50 TABLET ORAL
Qty: 30 TABLET | Refills: 0 | Status: SHIPPED | OUTPATIENT
Start: 2024-12-12 | End: 2025-01-11

## 2024-12-12 NOTE — ASSESSMENT & PLAN NOTE
Continue current antibiotic once that is finished go on Bactrim was ordered from infectious disease

## 2024-12-12 NOTE — PROGRESS NOTES
Transition of Care Visit  Name: Ayla Nye      : 1950      MRN: [id number]  Encounter Provider: Rafy Angelo MD  Encounter Date: 2024   Encounter department: [facility] PRIMARY [facility]    Assessment & Plan  Primary hypertension  Under control.  Continue amlodipine.  We will continue to monitor  Orders:  •  amLODIPine (NORVASC) 5 mg tablet; Take 1 tablet (5 mg total) by mouth daily    Metastatic cancer to brain (HCC)  Has been followed by oncologist.       Acute pulmonary embolism, unspecified pulmonary embolism type, unspecified whether acute cor pulmonale present (HCC)  Continue Xarelto as prescribed.  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  Repeat TSH in about 2 months.  Continue same dose of levothyroxine.  We will continue to monitor    Orders:  •  levothyroxine 50 mcg tablet; Take 1 tablet (50 mcg total) by mouth daily in the early morning         History of Present Illness     Transitional Care Management Review:   Ayla Nye is a 74 y.o. female here for TCM follow up.     During the TCM phone call patient stated:  TCM Call     Date and time call was made  2024  8:49 AM    Hospital care reviewed  Records reviewed    Patient was hospitialized at  [facility]    Date of Admission  24    Date of discharge  24    Diagnosis  Acute pulmonary embolism    Disposition  Home    Were the patients medications reviewed and updated  Yes    Current Symptoms  None    Left side leg pain severity  Moderat    Weakness severity  Moderate    Fatigue severity  Mild      TCM Call     Post hospital issues  None    Should patient be enrolled in anticoag  monitoring?  No    Scheduled for follow up?  Yes    Referrals needed  Left message for patient to return call to office to schedule a TCM appointment    Did you obtain your prescribed medications  Yes    Do you need help managing your prescriptions or medications  No    Is transportation to your appointment needed  No    I have advised the patient to call PCP with any new or worsening symptoms  Marta Vasquez MA    Living Arrangements  Alone    Support System  Family        Patient here for transitional care management as patient was hospitalized on November 24, 2024 and discharged on December 6, 2024 with acute pulmonary embolism.  Patient was placed on heparin drip and then Xarelto was started patient also had right upper lobe pneumonia and severe anemia all the records from the hospital reviewed patient currently feeling okay.  Denies any chest pain, shortness of breath, abdominal pain, nausea, vomiting, fever or chills.  No lightheadedness or dizziness.  No tingling numbness or weakness.  No bowel or bladder problem.  No other new problem.      Review of Systems   Constitutional:  Positive for fatigue. Negative for chills and fever.   HENT:  Negative for congestion, ear pain, rhinorrhea, sneezing and sore throat.    Eyes:  Negative for redness, itching and visual disturbance.   Respiratory:  Negative for cough, chest tightness and shortness of breath.    Cardiovascular:  Negative for chest pain, palpitations and leg swelling.   Gastrointestinal:  Negative for abdominal pain, blood in stool, diarrhea, nausea and vomiting.   Endocrine: Negative for cold intolerance and heat intolerance.   Genitourinary:  Negative for dysuria, frequency and urgency.   Musculoskeletal:  Negative for arthralgias, back pain and myalgias.   Skin:  Negative for color change and rash.   Neurological:  Negative for dizziness, weakness, light-headedness, numbness and headaches.   Hematological:  Does not bruise/bleed easily.  "  Psychiatric/Behavioral:  Negative for agitation, behavioral problems and confusion.      Objective   /80 (BP Location: Left arm, Patient Position: Sitting, Cuff Size: Standard)   Pulse 76   Temp 98 °F (36.7 °C) (Tympanic)   Resp 18   Ht 5' 2\" (1.575 m)   Wt 53.9 kg (118 lb 12.8 oz)   SpO2 99%   BMI 21.73 kg/m²     Physical Exam  Vitals and nursing note reviewed.   Constitutional:       General: She is not in acute distress.     Appearance: Normal appearance. She is well-developed. She is not ill-appearing, toxic-appearing or diaphoretic.   HENT:      Head: Normocephalic and atraumatic.      Nose: Nose normal.      Mouth/Throat:      Mouth: Mucous membranes are moist.      Pharynx: Oropharynx is clear. No posterior oropharyngeal erythema.   Eyes:      General: No scleral icterus.        Right eye: No discharge.         Left eye: No discharge.      Extraocular Movements: Extraocular movements intact.      Conjunctiva/sclera: Conjunctivae normal.      Pupils: Pupils are equal, round, and reactive to light.   Cardiovascular:      Rate and Rhythm: Normal rate and regular rhythm.      Pulses: Normal pulses.      Heart sounds: Normal heart sounds. No murmur heard.     No gallop.   Pulmonary:      Effort: Pulmonary effort is normal. No respiratory distress.      Breath sounds: Normal breath sounds. No wheezing or rales.   Abdominal:      General: Abdomen is flat. Bowel sounds are normal. There is no distension.      Palpations: Abdomen is soft.      Tenderness: There is no abdominal tenderness. There is no right CVA tenderness, left CVA tenderness or guarding.   Musculoskeletal:         General: No swelling or tenderness. Normal range of motion.      Cervical back: Normal range of motion and neck supple. No rigidity.      Right lower leg: No edema.      Left lower leg: No edema.   Lymphadenopathy:      Cervical: No cervical adenopathy.   Skin:     General: Skin is warm and dry.      Capillary Refill: " Capillary refill takes 2 to 3 seconds.      Coloration: Skin is not jaundiced or pale.      Findings: No bruising.   Neurological:      General: No focal deficit present.      Mental Status: She is alert and oriented to person, place, and time. Mental status is at baseline.      Sensory: No sensory deficit.      Gait: Gait normal.   Psychiatric:         Mood and Affect: Mood normal.         Behavior: Behavior normal.       Medications have been reviewed by provider in current encounter

## 2024-12-12 NOTE — ASSESSMENT & PLAN NOTE
Under control.  Continue amlodipine.  We will continue to monitor  Orders:  •  amLODIPine (NORVASC) 5 mg tablet; Take 1 tablet (5 mg total) by mouth daily

## 2024-12-12 NOTE — ASSESSMENT & PLAN NOTE
TSH in therapeutic range.  Repeat TSH in about 2 months.  Continue same dose of levothyroxine.  We will continue to monitor    Orders:  •  levothyroxine 50 mcg tablet; Take 1 tablet (50 mcg total) by mouth daily in the early morning

## 2024-12-13 ENCOUNTER — NURSE TRIAGE (OUTPATIENT)
Age: 74
End: 2024-12-13

## 2024-12-13 ENCOUNTER — TELEPHONE (OUTPATIENT)
Age: 74
End: 2024-12-13

## 2024-12-13 NOTE — TELEPHONE ENCOUNTER
"Patient calls in with concerns over nosebleeds.  She has had 2 nosebleeds in 2 days.  She has been newly prescribed Xarelto.  Nose bleeds resolved without nasal pressure and had minimal blood.    She is asking if her dosage should be adjusted.  Please advise.     Patient given home care advice.      Reason for Disposition   Mild-moderate nosebleed and bleeding has stopped now    Answer Assessment - Initial Assessment Questions  1. AMOUNT OF BLEEDING: \"How bad is the bleeding?\" \"How much blood was lost?\" \"Has the bleeding stopped?\"      Trickling did not last long .   2. ONSET: \"When did the nosebleed start?\"       2 in the last 2 days.   3. FREQUENCY: \"How many nosebleeds have you had in the last 24 hours?\"       2 in the last 2 days.   4. RECURRENT SYMPTOMS: \"Have there been other recent nosebleeds?\" If Yes, ask: \"How long did it take you to stop the bleeding?\" \"What worked best?\"       N/A  5. CAUSE: \"What do you think caused this nosebleed?\"      Unknown  6. LOCAL FACTORS: \"Do you have any cold symptoms?\", \"Have you been rubbing or picking at your nose?\"      Denies  7. SYSTEMIC FACTORS: \"Do you have high blood pressure or any bleeding problems?\"      N/A  8. BLOOD THINNERS: \"Do you take any blood thinners?\" (e.g., aspirin, clopidogrel / Plavix, coumadin, heparin). Notes: Other strong blood thinners include: Arixtra (fondaparinux), Eliquis (apixaban), Pradaxa (dabigatran), and Xarelto (rivaroxaban).      Xarelto  9. OTHER SYMPTOMS: \"Do you have any other symptoms?\" (e.g., lightheadedness)      Denies    Protocols used: Nosebleed-Adult-[place]    "

## 2024-12-13 NOTE — TELEPHONE ENCOUNTER
Left message for patient to call back to discuss nosebleed. MyChart message also sent back to daughter. Also advised to reach out to PCP who is managing xarelto. [facility] number provided [phone].

## 2024-12-13 NOTE — TELEPHONE ENCOUNTER
Xarelto dose should not be adjusted at this point but should go to the emergency room to get cauterized

## 2024-12-16 LAB — FUNGUS SPEC CULT: NORMAL

## 2024-12-17 LAB
MYCOBACTERIUM SPEC CULT: NORMAL
RHODAMINE-AURAMINE STN SPEC: NORMAL

## 2024-12-18 ENCOUNTER — TELEPHONE (OUTPATIENT)
Age: 74
End: 2024-12-18

## 2024-12-18 NOTE — TELEPHONE ENCOUNTER
Infusion: Please cancel patient's appointment on 12/26. Dr. Castro holding off on treatment for now. She will see her again on 12/30

## 2024-12-23 ENCOUNTER — APPOINTMENT (OUTPATIENT)
Dept: LAB | Facility: CLINIC | Age: 74
End: 2024-12-23
Payer: MEDICARE

## 2024-12-23 DIAGNOSIS — B59 PNEUMONIA OF RIGHT UPPER LOBE DUE TO PNEUMOCYSTIS JIROVECII (HCC): ICD-10-CM

## 2024-12-23 DIAGNOSIS — C34.11 MALIGNANT NEOPLASM OF UPPER LOBE, RIGHT BRONCHUS OR LUNG (HCC): ICD-10-CM

## 2024-12-23 LAB
ALBUMIN SERPL BCG-MCNC: 3.8 G/DL (ref 3.5–5)
ALP SERPL-CCNC: 38 U/L (ref 34–104)
ALT SERPL W P-5'-P-CCNC: 7 U/L (ref 7–52)
ANION GAP SERPL CALCULATED.3IONS-SCNC: 8 MMOL/L (ref 4–13)
AST SERPL W P-5'-P-CCNC: 9 U/L (ref 13–39)
BASOPHILS # BLD AUTO: 0.03 THOUSANDS/ÂΜL (ref 0–0.1)
BASOPHILS NFR BLD AUTO: 0 % (ref 0–1)
BILIRUB SERPL-MCNC: 0.43 MG/DL (ref 0.2–1)
BUN SERPL-MCNC: 17 MG/DL (ref 5–25)
CALCIUM SERPL-MCNC: 9 MG/DL (ref 8.4–10.2)
CHLORIDE SERPL-SCNC: 103 MMOL/L (ref 96–108)
CO2 SERPL-SCNC: 28 MMOL/L (ref 21–32)
CREAT SERPL-MCNC: 1 MG/DL (ref 0.6–1.3)
EOSINOPHIL # BLD AUTO: 0.05 THOUSAND/ÂΜL (ref 0–0.61)
EOSINOPHIL NFR BLD AUTO: 1 % (ref 0–6)
ERYTHROCYTE [DISTWIDTH] IN BLOOD BY AUTOMATED COUNT: 16 % (ref 11.6–15.1)
FUNGUS SPEC CULT: NORMAL
GFR SERPL CREATININE-BSD FRML MDRD: 55 ML/MIN/1.73SQ M
GLUCOSE SERPL-MCNC: 86 MG/DL (ref 65–140)
HCT VFR BLD AUTO: 30.4 % (ref 34.8–46.1)
HGB BLD-MCNC: 9.6 G/DL (ref 11.5–15.4)
IMM GRANULOCYTES # BLD AUTO: 0.16 THOUSAND/UL (ref 0–0.2)
IMM GRANULOCYTES NFR BLD AUTO: 2 % (ref 0–2)
LDH SERPL-CCNC: 298 U/L (ref 140–271)
LYMPHOCYTES # BLD AUTO: 0.26 THOUSANDS/ÂΜL (ref 0.6–4.47)
LYMPHOCYTES NFR BLD AUTO: 3 % (ref 14–44)
MAGNESIUM SERPL-MCNC: 2 MG/DL (ref 1.9–2.7)
MCH RBC QN AUTO: 31.4 PG (ref 26.8–34.3)
MCHC RBC AUTO-ENTMCNC: 31.6 G/DL (ref 31.4–37.4)
MCV RBC AUTO: 99 FL (ref 82–98)
MONOCYTES # BLD AUTO: 0.61 THOUSAND/ÂΜL (ref 0.17–1.22)
MONOCYTES NFR BLD AUTO: 7 % (ref 4–12)
MYCOBACTERIUM SPEC CULT: NORMAL
NEUTROPHILS # BLD AUTO: 7.74 THOUSANDS/ÂΜL (ref 1.85–7.62)
NEUTS SEG NFR BLD AUTO: 87 % (ref 43–75)
NRBC BLD AUTO-RTO: 0 /100 WBCS
PLATELET # BLD AUTO: 246 THOUSANDS/UL (ref 149–390)
PMV BLD AUTO: 8.9 FL (ref 8.9–12.7)
POTASSIUM SERPL-SCNC: 3.7 MMOL/L (ref 3.5–5.3)
PROT SERPL-MCNC: 6.5 G/DL (ref 6.4–8.4)
RBC # BLD AUTO: 3.06 MILLION/UL (ref 3.81–5.12)
RHODAMINE-AURAMINE STN SPEC: NORMAL
SODIUM SERPL-SCNC: 139 MMOL/L (ref 135–147)
WBC # BLD AUTO: 8.85 THOUSAND/UL (ref 4.31–10.16)

## 2024-12-23 PROCEDURE — 83615 LACTATE (LD) (LDH) ENZYME: CPT

## 2024-12-23 PROCEDURE — 85025 COMPLETE CBC W/AUTO DIFF WBC: CPT

## 2024-12-23 PROCEDURE — 36415 COLL VENOUS BLD VENIPUNCTURE: CPT

## 2024-12-23 PROCEDURE — 80053 COMPREHEN METABOLIC PANEL: CPT

## 2024-12-23 PROCEDURE — 83735 ASSAY OF MAGNESIUM: CPT

## 2024-12-26 ENCOUNTER — HOSPITAL ENCOUNTER (OUTPATIENT)
Dept: INFUSION CENTER | Facility: HOSPITAL | Age: 74
Discharge: HOME | End: 2024-12-26
Attending: INTERNAL MEDICINE

## 2024-12-26 PROBLEM — J18.9 PNEUMONIA: Status: RESOLVED | Noted: 2024-11-25 | Resolved: 2024-12-26

## 2024-12-26 NOTE — PROGRESS NOTES
HEMATOLOGY / ONCOLOGY CLINIC FOLLOW UP NOTE    Patient Ayla Nye  MRN: [id number]  : 1950  Date of Encounter 2024        Reason for Encounter: hospital follow up, pathology follow up for pelvic mass biopsy     C1 2024  C2  2024  C3 2024  C4 2024  C5 2024  C6 2024 at [place] f/b hospitalization for neutropenic fever     First line metastatic treatment     Carboplatin AUC 5 Pemetrexed 500 mg/m2 and Pembrolizumab 200 mg IV every 3 weeks x 4 cycles     C1 held due to hospital admission  C1 held due to radiation and concurrent high-dose steroid use     C1 now 10/7/2024  C2 10/28/2024-held due to admission     C2 2024- completed     No treatment since 2024 due to multiple admissions/toxicities, PCP pneumonia, PE on Xarelto            Admission -2024 PCP pneumonia, PE now on Xarelto                Oncology History Overview Note   history of [id number] (IIIB) NSCLC of the right upper lobe, s/p concurrent chemoRT completing on 24. She completed a course of SRS on 24 after MRI brain demonstrated five supra- and infratentorial enhancing lesions compatible with metastases. She also completed a course of palliative radiation to right gluteus on 10/18/24. She tolerated RT without much toxicity. She continued  to have right buttock pain. Has been prescribed increase in dex to 2mg BID, but not helpful. She was previously prescribed dilaudid but did not fill this medication. Discussed with palliative care who will call the patient to review non-opioid based alternatives including gabapentin. Pain control per palliative medicine.     She was admitted with seizure and neurologic symptoms prior to completion of her last fx of radiation therapy.  Patient received 14/15 fx to right gluteus. Due to prolonged hospitalization (10/16/24-current), patient had a break in treatment. Call placed to patient regarding last treatment, patient stated she was ok with  ending after 14 treatments. She returns today for first routine follow-up.       11/5/24 Dr. Castro  On treatment with Carboplatin AUC 5 Pemetrexed 500 mg/m2 and Pembrolizumab 200 mg IV every 3 weeks x 4 cycles   C1 held due to hospital admission  C1 held due to radiation and concurrent high-dose steroid use   C1 now 10/7/2024  C2 10/28/2024-held due to admission   C2 now 11/14/2024 11/13/24 Palliative care  Current regimen: Gabapentin 100mg AM, 100mg  Noon, 300mg Bedtime  Tylenol 650mg TID  Heating pad during the day  Pain is much better controlled.   Follow-up in 4 weeks.      11/24 - 12/6/24 Hospital admission  Presented with fatigue SOB, and home temp of 101.3.   Imaging showed b/l PE and RUL pneumonia      12/10/24 Dr. Castro  Admission 11/24-12/6/2024 PCP pneumonia, PE now on Xarelto    Atovaquone was started for 21 days, to complete 12/23/2024   Will then start Bactrim DS M/W/F   Again remains on prednisone, on current taper -60 mg bid x 5 days, then 40 mg x 5 days then 20 mg x 5 days then 10 mg daily-will be at 10 mg daily by 21 Dec 2024   Doing better, less SOB   Will hold any treatment for now-cannot get IO therapy and unclear that she is a good candidate based on chronic immunosuppression from steroids   Will consider Carboplatin/Alimta later this month        12/27/24 MRI brain BT        12/30/24 Dr. Castro        Upcoming appts:  1/7/25 CT chest wo contrast  1/8/25 Palliative care  1/15/25 Infusion   1/29/25 Pulmonary       Malignant neoplasm of upper lobe, right bronchus or lung (HCC)   2024 Initial Diagnosis    Primary lung adenocarcinoma, right (HCC)     3/26/2024 Biopsy    A-C. Lymph Node, Level 4R :    - Metastatic non-small cell carcinoma, most compatible with a primary lung adenocarcinoma; see note.       D-F. Lymph Node, Level 10R:    - Metastatic non-small cell carcinoma; see note.       G-I. Lymph Node, Level 4L:    - Metastatic non-small cell carcinoma; see note.       4/15/2024 -   Cancer Staged    Staging form: Lung, AJCC 8th Edition  - Clinical stage from 4/15/2024: Stage IIIB (cT2b, cN3, cM0) - Signed by Rogelio Beebe DO on 4/15/2024       5/23/2024 - 7/2/2024 Chemotherapy    alteplase (CATHFLO), 2 mg, Intracatheter, Every 1 Minute as needed, 6 of 6 cycles  CARBOplatin (PARAPLATIN) IVPB (GOG AUC DOSING), 130.4 mg, Intravenous, Once, 6 of 6 cycles  Administration: 130.4 mg (5/23/2024), 144.8 mg (5/30/2024), 138.4 mg (6/6/2024), 144.8 mg (6/13/2024), 132 mg (6/21/2024), 143.6 mg (7/2/2024)  PACLItaxel (TAXOL) chemo IVPB, 45 mg/m2 = 67.2 mg (90 % of original dose 50 mg/m2), Intravenous, Once, 6 of 6 cycles  Dose modification: 45 mg/m2 (original dose 50 mg/m2, Cycle 1, Reason: Anticipated Tolerance)  Administration: 67.2 mg (5/23/2024), 67.2 mg (5/30/2024), 67.2 mg (6/6/2024), 67.2 mg (6/13/2024), 67.2 mg (6/21/2024), 67.2 mg (7/2/2024)     5/23/2024 - 7/5/2024 Radiation    Treatment:  Course: C1    Plan ID Energy Fractions Dose per Fraction (cGy) Dose Correction (cGy) Total Dose Delivered (cGy) Elapsed Days   R Lung_Hilum 6X 30 / 30 200 0 6,000 43      Treatment dates:  C1: 5/23/2024 - 7/5/2024 8/8/2024 - 8/8/2024 Radiation    SRS 5 PTVs   2000 cGy     9/12/2024 Biopsy    Mass, Superficial perianal mass:  - Squamous cell carcinoma.     Note: The patient's prior lung EBUS sampling shows the tumor to stain for TTF1 and Napsin with absent p40 expression.  The current perianal mass sampling shows diffuse p40 expression with absent TTF1 expression.  This may represent a primary anal/perianal squamous cell carcinoma versus a possible metastatic combined lung tumor (adenocarcinoma and previously unsampled squamous component).  Suggest clinical correlation and appropriate follow-up.         9/23/2024 -  Cancer Staged    Staging form: Lung, AJCC 8th Edition  - Clinical: Stage IV (cM1) - Signed by Honey Gamboa MD on 9/23/2024       10/1/2024 - 10/18/2024 Radiation    Course: C3    Plan  ID Energy Fractions Dose per Fraction (cGy) Dose Correction (cGy) Total Dose Delivered (cGy) Elapsed Days   R Gluteus:1 10X/6X 14 / 15 300 0 4,200 17      Treatment Dates:  10/1/2024 - 10/18/2024.       10/7/2024 -  Chemotherapy    cyanocobalamin, 1,000 mcg, Intramuscular, Once, 1 of 3 cycles  Administration: 1,000 mcg (8/19/2024)  alteplase (CATHFLO), 2 mg, Intracatheter, Every 1 Minute as needed, 2 of 6 cycles  fosaprepitant (EMEND) IVPB, 150 mg, Intravenous, Once, 2 of 6 cycles  Administration: 150 mg (10/7/2024), 150 mg (11/14/2024)  CARBOplatin (PARAPLATIN) IVPB (GO AUC DOSING), 347 mg, Intravenous, Once, 2 of 6 cycles  Administration: 347 mg (10/7/2024), 346 mg (11/14/2024)  pemetrexed (ALIMTA) chemo infusion, 735 mg, Intravenous, Once, 2 of 6 cycles  Administration: 700 mg (10/7/2024), 700 mg (11/14/2024)  pembrolizumab (KEYTRUDA) IVPB, 200 mg, Intravenous, Once, 2 of 6 cycles  Administration: 200 mg (11/14/2024), 200 mg (10/7/2024)     Metastatic cancer to brain (HCC)   7/29/2024 Initial Diagnosis    Metastatic cancer to brain (HCC)     8/8/2024 - 8/8/2024 Radiation    SRS 5 PTVs   2000 cGy     9/12/2024 Biopsy    Mass, Superficial perianal mass:  - Squamous cell carcinoma.     Note: The patient's prior lung EBUS sampling shows the tumor to stain for TTF1 and Napsin with absent p40 expression.  The current perianal mass sampling shows diffuse p40 expression with absent TTF1 expression.  This may represent a primary anal/perianal squamous cell carcinoma versus a possible metastatic combined lung tumor (adenocarcinoma and previously unsampled squamous component).  Suggest clinical correlation and appropriate follow-up.         10/1/2024 - 10/18/2024 Radiation    Course: C3    Plan ID Energy Fractions Dose per Fraction (cGy) Dose Correction (cGy) Total Dose Delivered (cGy) Elapsed Days   R Gluteus:1 10X/6X 14 / 15 300 0 4,200 17      Treatment Dates:  10/1/2024 - 10/18/2024.              Assessment/Plan:    "  Ms. 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  She has been tolerating Hydrea 500 mg once daily Monday through Friday and off on Saturday and Sunday. Patient also with 4.1 cm RUL mass 1.5cm spiculated posterior nodule mediastinal adenopathy, no metastatic disease including brain MRI new diagnosis      ET:      Previously treated with hydroxyurea (HYDREA) 500 mg capsule     Plts 498 > 434 > 388 (holding hydroxyurea since 5/23/2024 2/2 drop in platelets from chemotherapy)     Plts 166 (7/4/2024) s/p C6 chemo, continuing to hold hydroxyurea, recheck labs and will plan to restart once labs show thrombocytosis.     Does not need to be restarted on hydrea; labs from 8/1/2024 were 192      Plts 8/24/2024 were 227      NSCLC/adenocarcinoma; mutational analysis pending      Now with RUL 4.2 cm mass with mediastinal adenopathy at least cT2a cN2 cM0 lung cancer     cT2b N3 Stage IIIB lung      Please see above regarding discussion      Carboplatin AUC 2 Taxol 45 mg/m2 weekly with RT     Durvalumab adjuvant post treatment depending on response     Neuropathy has resolved after last cycle of Taxol.     Esophagitis appearing better after completing radiation.     7/1/2024     Was admitted 6/22-6/24/2024 for neutropenic fever     WBC 2.5 ANC 1810 Hgb 9.0 plts 240 (hx ET/off hydrea)     Continue with C6 7/2/2024; last radiation 7/5/2024       7/15/2024     Completed C6 on 7/2/2024 and last radiation 7/5/2024.  Was admitted for neutropenic fever after last cycle of chemo.  Discharged on 7/4/2024 and has been asymptomatic since.  Labs at discharge showed WBC 1.5, ANC 1317 and Plt 166.     Repeat CT PET 8/26/2024, radiology read from CT CAP in ED showed \"progression\" of disease but this study was performed during chemoradiation, so it is confounded by the inability to distinguish disease from inflammation.  Will  treatment response on repeat CT PET.   "   Recheck labs to confirm neutropenia has resolved.     Hold Otezla for psoriasis until neutropenia has resolved, but would like to restart as soon as safe as psoriasis is rebounding.     8/16/2024     Was admitted for new onset brain mets 7/30/2024; on decadron taper down to 1 mg daily next week     S/p SRS 6 fractions completed 8 August 2024 at [place]     Had modest response to treatment per PET in terms of the lung     At issue clearly are the new brain lesions 2.0x.4 left occiput as well as multiple other smaller lesions as below     In addition PET with new SUV 9.9 mass in the right anorectal fat/right ischial tuberosity 2.7x2 which is painful to patient and feels is growing     Will get MRI pelvis; will probably need biopsy.  Less likely abscess and more likely disease but unusual place for a NSCLC to metastasize to     Will start Carboplatin/Pemetrexed/Pembrolizumab and had long discussion regarding this         9/17/2024     Biopsy periranal area as follows:         . Mass, Superficial perianal mass:  - Squamous cell carcinoma.     Note: The patient's prior lung EBUS sampling shows the tumor to stain for TTF1 and Napsin with absent p40 expression.  The current perianal mass sampling shows diffuse p40 expression with absent TTF1 expression.  This may represent a primary anal/perianal squamous cell carcinoma versus a possible metastatic combined lung tumor (adenocarcinoma and previously unsampled squamous component).  Suggest clinical correlation and appropriate follow-up.      Immunohistochemistry was performed on block A3. The tumor cells are positive for p40, AE1/3, CK7, GATA3; MOC-13 shows nonspecific staining and negative for CDX2, p16, CD45, CD31, synaptophysin, chromogranin, mucicarmine, p53 (wild-type), supporting the above diagnosis.  Napsin A is pending and results will be reported in an addendum.      Will have Rad Onc see patient as pain is unmanageable and will not use narcotics     Is seeing  Palliative Care     Will add celebrex 200 mg bid for now as taking extensive Tylenol with no pain relief     Was taking oxycodone, did not help and does not want dilaudid as lives alone      In terms of her metastatic disease, she was being treated for adenocarcinoma and this pathology is SCC; NSCLC is adenocarcinoma and thus this may be new primary lesion     May consider Carboplatin with taxane     Cannot start IO therapy as decadron dose remains above 20 mg prednisone equivalent and attempts at tapering have led to worsening neurological symptoms        9/23/2024     Pain improved with Celebrex/Tylenol      Did see Rad Onc; planning 15 fractions to right gluteal area with path c/w SCC     Markers for lung adenocarcinoma as was [facility]     This occurred also on Carboplatin/Taxol and CRT     Thus, will treat as planned with Carboplatin/Pemetrexed/Pembrolizumab     Decadron 2 mg bid; plan to 2 mg daily next week.     Can remain on that as equivalent to 10 mg prednisone     Plan for chemo at [facility] 7th Oct 2024      10/9/2024     Continues with rectal pain; was started on RT for SCC/new primary in right muscle-has had 6/15 fractions     Was on decadron taper to 2 mg daily which allows IO therapy to work as not effective at doses above 10 mg prednisone/2 mg decadron ; Rad Onc increased to 4 mg decadron for pain control but probably compromised efficacy of Pembrolizumab      Seeing Dr Neff tomorrow      Did tolerate C1 of Carboplatin/Pemetrexed/Pembrolizumab with no side effects        11/5/2024     Again admitted for HA/dizziness off decadron; discharged 11/4/2024; also found to be COVID positive, on paxlovid at present      Restarted 2 mg daily decadron as cannot tolerate taper     EEG done/ questionable seizure activity, on keppra     Scans repeated and with only 1 cycle of chemo which is not telling results as follows:     CT CAP 10/22/2024    1. New small bilateral pulmonary nodules, likely metastases.     2. New  right renal lesion(s) concerning for metastasis.     3. Significant interval enlargement of necrotic appearing mass in the right ischiorectal fossa, likely metastasis.     4. Decreasing right upper lobe necrotic mass with stable and/or decreasing satellite nodularity.     5. Improved mediastinal and right hilar adenopathy.     6. Stable 2.4 cm left upper lobe groundglass density.      MRI brain 10/21/2024       Redemonstration of intracranial metastatic lesions  2.3 cm enhancing lesion at the left occipital lobe is slightly increased in maximum dimension with increased central necrosis at the anterior margin that may be partly due to radiation necrosis.  Additional smaller metastases are stable or mildly decreased in size.  Stable left parieto-occipital edema with mass effect on the left lateral ventricle.  No new metastases. No acute infarct or hemorrhage     Will remain on decadron and will attempt C2 now on 11/14/2024     12/10/2024     Again admaitted 11/24-12/6/2024 with SOB/fevers.  Found to have PCP pneumonia     Atovaquone was started for 21 days, to complete 12/23/2024     Will then start Bactrim DS M/W/F     Again remains on prednisone, on current taper -60 mg bid x 5 days, then 40 mg x 5 days then 20 mg x 5 days then 10 mg daily-will be at 10 mg daily by 21 Dec 2024     On Xarelto for PE      Doing better, less SOB     No CNS symptoms at present     Will hold any treatment for now-cannot get IO therapy and unclear that she is a good candidate based on chronic immunosuppression from steroids     Will consider Carboplatin/Alimta later this month     12/30/2024    Worsening SOB/cough over past few days    RA pulse ox 93% decreased from 99% last visit    Treated for PCP pneumonia with Atovaquone completed 12/23/2024 now on Bactrim DS MW F    Able to walk only few steps and has to rest    Prednisone tapered to 10 mg daily and now again more issues with balance    Would rule out adrenal insuffciency due to long  term steroids/tapering/effects    May need to increase back to 20 mg daily prednisone vs hydrocortisone    /72    Assess for dehydration as well    Will be sent to ER for further evaluation          ET     Plts off hydrea 220      Plts 102 11/4/2024     Plts 371 as of 12/6/2024     Plts 246 12/23/2024       Follow up        Depending on whether admission today       History of present illness:  Initial Visit 4/10/2024     Ayla Nye is a 73-year-old female with past medical history of hypertension, psoriatic arthritis, CKD stage III, hyperlipidemia, and latent TB.  She is seen in follow-up for essential thrombocytosis.  She has had an elevated platelet count dating back to January 2028 all of her other cell lines were within normal limits.  Her highest platelet count was around 700,000.  Myeloproliferative work-up demonstrated positive JAK2 mutation and she was started on 500 mg of Hydrea daily in July 2020.  Due to leukopenia her dose was reduced to Monday, Wednesday, Friday.  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  Then again in July we had to decrease her dose and she is taking 500 mg once daily Monday through Friday and not taking any on Saturday or Sunday.     She has been tolerating the 500 mg of Hydrea Monday through Friday off Saturday and Sunday well without any side effects.  She was seen by my attending and underwent further workup of her recurrent infections and was found to have a mildly low IgG level.   She has had pneumonia twice once in October and then again in February.  She states that she has had multiple imaging of her chest which does suggest scarring which prompted a CT scan of her chest .  .  She is also had recurrent sinus infections.  She is a former smoker and quit 15 years ago bit was 1[[d  x more than 30 years.        She does not have any continued symptoms of pneumonia and no cough, shortness of breath, or fevers.       She underwent the following testing; EBUS with pulmonary as well as CT chest/PET scan     Lung mass on CT chest 2/28/2024     LUNGS:     -4.1 x 3.4 cm solid mass in the anterior right upper lobe (series 302, image 73).     -Posterior to this mass, there is a 1.5 x 1.5 cm spiculated nodule (series 302, image 78)     -2.5 x 2.1 cm groundglass nodule in the anterior left upper lobe (series 302, image 90)     Mild emphysema.     Right apical pleural-parenchymal scarring.     PLEURA: Small calcified left posterior pleural plaque, which may be from prior asbestos exposure or the sequela of old inflammation.     HEART/GREAT VESSELS: Normal heart size. Mild-moderate coronary artery calcification. Mild to moderate mitral annular calcification. Trace pericardial effusion. No thoracic aortic aneurysm.     MEDIASTINUM AND SUDEEP: 1.6 x 2.0 cm right lower paratracheal/precarinal lymph node. 1.3 x 1.3 cm right lower paratracheal lymph node.        CT PET  3/28/2024     Intensely FDG avid right upper lobe mass, SUV max of 18. This abuts the anterior pleural margin. Central photopenia suggest necrosis. Mass measures on the order of 4.2 cm in size, stable previously 4.1 cm.     Subjacent 1.5 cm spiculated nodule posteriorly demonstrates minimal uptake, SUV max of 1.7.     Minimal FDG uptake in the left upper lobe groundglass nodule, SUV max of 2.2. This measured up to 2.5 cm in size on recent CT, appears grossly stable on low-dose CT.     A few FDG-avid mediastinal lymph nodes. Right anterior paratracheal lymph node demonstrates SUV max of 13. This measures up to 1.9 cm short axis image 85 series 3, may be slightly larger previously 1.6 cm.     A right perihilar lymph node demonstrates SUV max of 12. This measures on the order of 1.2 cm short axis image 89 series 3.  CT images: Scattered emphysematous  changes of the lung fields. Mild to moderate coronary artery calcifications. Minimal left pleural calcification. Small calcified lymph nodes in the left perihilar region        3/26/2024     -C. Lymph Node, Level 4R (ThinPrep, smears and cell block preparations):    - Metastatic non-small cell carcinoma, most compatible with a primary lung adenocarcinoma; see note.    - Satisfactory for evaluation.     D-F. Lymph Node, Level 10R (ThinPrep, smears and cell block preparations):    - Metastatic non-small cell carcinoma; see note.    - Satisfactory for evaluation.     G-I. Lymph Node, Level 4L (ThinPrep, smears and cell block preparations):    - Metastatic non-small cell carcinoma; see note.    - Satisfactory for evaluation.      7/3/2024    CT CHEST, ABDOMEN AND PELVIS WITH IV CONTRAST     INDICATION: hx of lung cancer, fever, cough. Fever (102.9 degrees Fahrenheit), cough, recently had chemotherapy.     COMPARISON: February 28, 2024     TECHNIQUE: CT examination of the chest, abdomen and pelvis was performed. Multiplanar 2D reformatted images were created from the source data.     This examination, like all CT scans performed in the [facility], was performed utilizing techniques to minimize radiation dose exposure, including the use of iterative reconstruction and automated exposure control. Radiation dose length   product (DLP) for this visit: 314 mGy-cm     IV Contrast: 100 mL of iohexol (OMNIPAQUE)  Enteric Contrast: Not administered.     FINDINGS:     CHEST     LUNGS: There is an approximately 4.6 x 4.8 x 3.3 cm mass within the anteromedial aspect of the right upper lobe of the lung. This mass abuts the anterior and anteromedial pleural surface and the right hand aspect of the upper mediastinum. The mass   demonstrates peripheral spiculation and areas of low density centrally, suggesting areas of necrosis. Just posterolateral to this mass there is a spiculated satellite lesion measuring  approximately 1.6 cm. There is also a spiculated satellite lesion   anterior and inferior to the dominant mass, measuring approximately 1.4 cm and abutting the anterior pleural surface (series 302 image 91); this satellite mass appears new compared with February 28, 2024. An ill-defined groundglass opacity in the left   upper lobe of the lung measures approximately 2.5 cm, similar to the prior study.     There is mild to moderate emphysema. There is mild bibasilar scarring versus atelectasis. Right apical scarring unchanged.     PLEURA: Left pleural calcifications unchanged. No pleural effusions. No pneumothorax.     HEART/GREAT VESSELS: Coronary artery disease. There is atherosclerosis of the thoracic aorta. No thoracic aortic aneurysm.     MEDIASTINUM AND SUDEEP: There is a 2.4 x 2.2 cm right paratracheal node demonstrating low density centrally suggesting necrosis; this is larger compared with February 28, 2024. Additionally, there is a 2.8 x 2 cm precarinal node demonstrating low density   centrally suggesting necrosis, also larger compared to the prior study. Necrotic appearing right hilar adenopathy measuring up to 2 cm.     CHEST WALL AND LOWER NECK: Tiny thyroid nodules, measuring 6 mm or less. Incidental discovery of one or more thyroid nodule(s) measuring less than 1.5 cm and without suspicious features is noted in this patient who is above 35 years old; according to   guidelines published in the February 2015 white paper on incidental thyroid nodules in the Journal of the American College of Radiology (JACR), no further evaluation is recommended.   Interval History: 9/17/2024     Patient is here for treatment and hospital follow up.     Admitted 9/9/24 to 9/12/24 for AMS found to have MRI Brain showing increased cerebral vasogenic edema after recently steroid taper (not stopped), c/f leptomeningeal mets, evidence of new tiny temporal lobe met, evidence of unchanged parietal and occipital mets, and evidence  of decreased frontal lobe and cerebellar mets.  She was also noted to have a new mass of her deep R gluteal fossa.  Her mental status rapidly improved with steroids.  Patient underwent LP and biopsy of her R gluteal mass.  Initial LP studies showed 0 WBCs, 0 RBCs, normal glucose, slightly elevated protein at 73, and Gram stain and cultures were negative ruling out meningitis and ICH.  No evidence thus far of leptomeningeal metastasis on LP.  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     She was placed on a steroid taper at discharge on 9/12 as follows:       -dexamethasone 4 BID x4 days f/b       -dexamethasone 4 AM and 2 PM x7 days f/b       -dexamethasone 2 BID x7 days f/b       -dexamethasone 2 daily with further taper per heme/onc     Her levothyroxine was discontinued and will be followed with serial blood draws.     Labs from the day of her discharge (9/13/24) showed folate >22, B12 1268, , WBC 8.5, Hb 10.5, Plt 264, TSH 0.403, FT40.92, and FT3 2.33.     Patient endorsed acute on chronic anal pain that has worsened since her anal mass biopsy.  The pain is sharp and severe and occasionally radiates down her leg.  It is best in the morning after taking her steroids.  She was prescribed oxycodone 5 mg Q4H (36 MME) at hospital discharge which has not helped her pain.  She spoke with palliative over the phone yesterday and has an appointment with them tomorrow.  They recommended changing from oxycodeone to Dilaudid 2 mg PO Q4H (60 MME) and they gave her Narcan.  She is very concerned about taking opioids as she lives alone and would not have anyone to give her Narcan.     She is amanable to radiation therapy and would like to start as soon as possible.         Interval History:   11/5/2024     Admission 10/16/2024-11/4/2024     Again, admitted dizziness/headaches; had been weaned off decadron.  No new brain lesions on MRI; restarted decadron currently again at 2  mg and will remain at this dose.  Synthroid iremains at 50 mcg daily Is very fatigued.  Has COVID diagnosed two days ago on paxlovid.  Has no symptoms at present.  Anxious to restart chemotherapy. Is being followed by Palliative .  Weight 116 pounds.  Facial edema, no SOB/CAIN. Fatigued Continues to have significant anal pain, using NSAIDS.  Radiation was not effective            MRI 10/21/2024       Redemonstration of intracranial metastatic lesions  2.3 cm enhancing lesion at the left occipital lobe is slightly increased in maximum dimension with increased central necrosis at the anterior margin that may be partly due to radiation necrosis.  Additional smaller metastases are stable or mildly decreased in size.  Stable left parieto-occipital edema with mass effect on the left lateral ventricle.  No new metastases. No acute infarct or hemorrhage        CT CAP 10/22/2024        CHEST     LUNGS: 2.5 x 1.8 x 1.9 cm irregular, necrotic right upper lobe medial juxtapleural mass (6/38 and 601/43) has decreased in size from previous study, previously 4.6 x 4.8 x 3.3 cm by report. A 7 x 5 mm spiculated nodule posterior to the dominant mass is   again noted which appears similar (6/41). Another previously described satellite nodule anterior and inferior to the dominant mass in the right lobe previously measuring up to 1.4 cm currently measures about 0.8 x 0.4 cm (6/50. There is some linear   scarring or atelectasis also noted anteriorly and medially from the dominant mass toward the pleura.     New 8 mm right lower lobe nodule (6/94).  New 5 mm left lower lobe nodule (6/93).     Stable 2.4 cm groundglass opacity anterior left upper lobe (6/43).     Mild background emphysema. Mild scattered linear atelectasis or scarring in the lungs.     AIRWAYS: No significant filling defect.     PLEURA: No pleural effusion. Stable right apical pleural-parenchymal scarring. Pleural calcification posterior left lower lobe again noted.      HEART/GREAT VESSELS: Heart is unremarkable for patient's age. Atherosclerotic changes thoracic aorta and coronary arteries. No thoracic aortic aneurysm.     MEDIASTINUM AND SUDEEP:  -Right paratracheal lymph node measuring 1.0 x 0.7 cm (2/44), previously 2.4 x 2.2 cm.     -1.2 x 0.8 cm precarinal lymph node (2/50), significantly decreased from previous diagnostic study in July 2024, difficult to directly compare to previous size on noncontrast PET CT but also possibly slightly smaller. Some FDG avidity was present in this   region on the prior PET study.     -Right hilar lymph node seen on the prior diagnostic CT measures about 6 mm short axis (2/56), previously about 1.7 cm when measured in a similar fashion and is of lower density currently.     -4 mm short axis subcarinal lymph node, previously measured about 9 mm.     The esophagus is unremarkable.     CHEST WALL AND LOWER NECK: Subcentimeter bilateral thyroid nodules. Incidental discovery of one or more thyroid nodule(s) measuring less than 1.5 cm and without suspicious features is noted in this patient who is above 35 years old; according to   guidelines published in the February 2015 white paper on incidental thyroid nodules in the Journal of the American College of Radiology (JACR), no further evaluation is recommended.     ABDOMEN     LIVER/BILIARY TREE: Mild hepatomegaly. Tiny hypodensity in the left hepatic lobe (2/107), stable in retrospect. Mild prominence of the common hepatic duct with tapering of the distal CBD, may be age-related.     GALLBLADDER: No calcified gallstones. No pericholecystic inflammatory change.     SPLEEN: Unremarkable.     PANCREAS: Unremarkable.     ADRENAL GLANDS: Unremarkable.     KIDNEYS/URETERS: There is an ill-defined 2.2 x 1.8 cm hypodensity within the medial upper pole right kidney which is new from the previous CT study and not clearly detectable on prior PET/CT either. There is no significant perinephric fat stranding or    elevated white blood cell count to suggest pyelonephritis, therefore this is suspicious for metastasis. There is also a 6 mm hypodensity in the posterior lower pole right kidney, barely if at all perceptible on the prior contrast-enhanced CT study.   Although this could correspond to enlargement of a complex subcentimeter cyst (as there are several additional small cysts in the right kidney), additional metastasis is not excluded.  Tiny nonobstructing calculi right kidney measuring up to 2 mm.     Several subcentimeter left renal hypodensities are too small to characterize, unchanged from prior study. No hydronephrosis on either side.     STOMACH AND BOWEL: Colonic diverticulosis without findings of acute diverticulitis.     APPENDIX: No findings to suggest appendicitis.     ABDOMINOPELVIC CAVITY: No diffuse ascites. Trace presacral edema which is new. No pneumoperitoneum. No lymphadenopathy.     VESSELS: Atherosclerotic changes abdominal aorta without evidence of aneurysm.     PELVIS     REPRODUCTIVE ORGANS: Unremarkable for patient's age.     URINARY BLADDER: Tiny focus of gas within the urinary bladder, presumably related to recent catheterization. Correlate clinically.     ABDOMINAL WALL/INGUINAL REGIONS: Enlarging 6.3 x 4.0 x 5.0 cm irregular rim-enhancing mass with central low attenuation in the right ischiorectal fossa abutting the anteromedial margin of the right gluteus britt muscle, the adjacent obturator internus   as well as the right levator ani muscle but without discrete muscle invasion of these structures. This was subcentimeter in size on previous CT study from July in retrospect, measured about 2.7 x 2.0 cm on the previous PET/CT and was FDG avid. This   almost certainly represents significant enlargement of a necrotic metastasis given interval enlargement over recent subsequent study and could be easily verified with percutaneous biopsy if necessary.     BONES: No acute fracture or suspicious  osseous lesion.     IMPRESSION:     1. New small bilateral pulmonary nodules, likely metastases.     2. New right renal lesion(s) concerning for metastasis.     3. Significant interval enlargement of necrotic appearing mass in the right ischiorectal fossa, likely metastasis.     4. Decreasing right upper lobe necrotic mass with stable and/or decreasing satellite nodularity.     5. Improved mediastinal and right hilar adenopathy.     6. Stable 2.4 cm left upper lobe groundglass density.                 Interval History:  12/10/2024     As above, again admitted; with PCP pneumonia as well as PE.  Will complete atovaquone 23 Dec and will then start PCP prophylaxis with bactrim DS M/W/F     Prednisone taper as above     Concern with CNS flair once steroids at 10 mg daily     Actually doing better than expected     Last labs 12/6/2024 with WBC 7.9 Hgb 7.8  plts 371 ANC 6970 Na 142 k 3.8 Cr 0.75 ALT/AST 11/11 Ca 9.0      Admitted 11/25/2024-12/6/2024             Interval History: 12/30/2024    Doing poorly.  Increased SOB/CAIN, able to go 3-4 steps in her house without having to sit down.  Cough worse.  Completed Atovaquone for PCP pneumonia on 12/23/2024 and on Bactrim DS MWF but cough worse, breathing worse.  Has PE as well, on Xarleto.  Concern with superimposed pneumonia vs disease progression vs other source, cardiac, infection, lymphangitic spread/pneumonitis that is flaring with steroid taper.  Concern with long term steroids, taper, possible adrenal insufficiency, again with increased dizziness, BP appears normal, electrolytes normal but unclear if possible AI as well.  She is very fatigued.  Has not had chemotherapy since early Nov due to all toxicities.      Labs 12/23/2024 with Na 139 K 3.7 Cr 1.0 Ca 9.0  ALT/AST 7/9 Mg 2.0 WBC 8.9 Hgb 9.6 MCV 99 plts 246 ANC [id number]          REVIEW OF SYSTEMS: as above   Please note that a 14-point review of systems was performed to include Constitutional, HEENT,  Respiratory, CVS, GI, , Musculoskeletal, Integumentary, Neurologic, Rheumatologic, Endocrinologic, Psychiatric, Lymphatic, and Hematologic/Oncologic systems were reviewed and are negative unless otherwise stated in HPI. Positive and negative findings pertinent to this evaluation are incorporated into the history of present illness.      ECOG PS: 3    PROBLEM LIST:  Patient Active Problem List   Diagnosis    TB lung, latent    Psoriatic arthritis (HCC)    Essential thrombocytosis (HCC)    Hypertension    Mixed hyperlipidemia    Encounter for monitoring of hydroxyurea therapy    JAK2 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    Age-related osteoporosis without current pathological fracture    Malignant neoplasm of upper lobe, right bronchus or lung (HCC)    Bilateral leg pain    Insomnia    Moderate protein-calorie malnutrition (HCC)    Dehydration    Visual disturbance    Metastatic cancer to brain (HCC)    Brain mass    Malignant neoplasm metastatic to brain (HCC)    Acute metabolic encephalopathy    Altered mental status    Hypothyroidism    Abnormal imaging of central nervous system    Right hip pain    Goals of care, counseling/discussion    Cancer associated pain    Palliative care patient    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 2023), Essential thrombocytosis (HCC), Hyperlipidemia, Hypertension, Mass in chest, Nodular goiter, Osteoporosis, Pneumonia (Oct 16, 2023), Psoriasis, and SIRS (systemic inflammatory response syndrome) (HCC) (06/22/2024).    PAST SURGICAL HISTORY:   has a past surgical history that includes Appendectomy; Mammo needle localization right (all inc) (Right, 07/13/2009); Skin lesion excision; Colonoscopy  (10/31/2018); Mammo (historical); DXA procedure(historical) (04/21/2017); Breast cyst excision (Right, 2009); IR lumbar puncture (9/12/2024); and IR biopsy other (9/12/2024).    CURRENT MEDICATIONS  Current Outpatient Medications   Medication Sig Dispense Refill    acetaminophen (TYLENOL) 325 mg tablet Take 2 tablets (650 mg total) by mouth every 6 (six) hours as needed for mild pain or moderate pain Taking as needed.      amLODIPine (NORVASC) 5 mg tablet Take 1 tablet (5 mg total) by mouth daily 30 tablet 0    atovaquone (MEPRON) 750 mg/5 mL suspension Take 5 mL (750 mg total) by mouth 2 (two) times a day for 19 days 95 mL 0    folic acid (FOLVITE) 1 mg tablet Take 1 tablet (1 mg total) by mouth daily 30 tablet 6    gabapentin (NEURONTIN) 100 mg capsule Take 1 capsule PO in the AM  and 3 capsules PO HS.      levETIRAcetam (KEPPRA) 1000 MG tablet Take 1 tablet (1,000 mg total) by mouth every 12 (twelve) hours 60 tablet 0    levothyroxine 50 mcg tablet Take 1 tablet (50 mcg total) by mouth daily in the early morning 30 tablet 0    pantoprazole (PROTONIX) 40 mg tablet Take 1 tablet (40 mg total) by mouth daily in the early morning 30 tablet 5    predniSONE 10 mg tablet Take 1 tablet (10 mg total) by mouth daily 60 tablet 1    Probiotic Product (PROBIOTIC DAILY PO) Take 1 capsule by mouth daily ON HOLD      rivaroxaban (Xarelto) 15 mg tablet Take 1 tablet (15 mg total) by mouth 2 (two) times a day for 21 days 42 tablet 0    rivaroxaban (Xarelto) 20 mg tablet Take 1 tablet (20 mg total) by mouth daily with breakfast 30 tablet 0    senna (SENOKOT) 8.6 MG tablet Take 1 tablet by mouth daily Takes one every other day      sulfamethoxazole-trimethoprim (BACTRIM DS) 800-160 mg per tablet Take 1 tablet by mouth daily Starting 12/24, you can take one tablet Monday, Wednesday, and Fridays. Do not start before December 24, 2024. 36 tablet 4    vitamin B-12 (VITAMIN B-12) 1,000 mcg tablet Take 1 tablet (1,000 mcg total) by mouth  daily 90 tablet 1     No current facility-administered medications for this visit.     [unfilled]    SOCIAL HISTORY:   reports that she has quit smoking. Her smoking use included cigarettes. She started smoking about 59 years ago. She has a 59 pack-year smoking history. She has been exposed to tobacco smoke. She has never used smokeless tobacco. She reports current alcohol use of about 5.0 standard drinks of alcohol per week. She reports that she does not use drugs.     FAMILY HISTORY:  family history includes Cancer in her brother and brother; Coronary artery disease in her brother; Depression in her mother; Hearing loss in her mother; Hypertension in her brother and mother; No Known Problems in her daughter, daughter, maternal aunt, maternal aunt, maternal aunt, maternal aunt, maternal grandfather, maternal grandmother, paternal aunt, paternal grandfather, and paternal grandmother; Stroke in her mother; Tuberculosis in her brother and father.     ALLERGIES:  is allergic to doxycycline, keflex [cephalexin], and neomycin-polymyxin-dexameth.      Physical Exam:  Vital Signs:   Visit Vitals  OB Status Postmenopausal   Smoking Status Former     There is no height or weight on file to calculate BMI.  There is no height or weight on file to calculate BSA.    GEN: Alert, awake oriented x3, in no acute distress  HEENT- No pallor, icterus, cyanosis, no oral mucosal lesions,   LAD - no palpable cervical, clavicle, axillary, inguinal LAD  Heart- normal S1 S2, regular rate and rhythm, No murmur, rubs.   Lungs- decreased bases, no wheeze, rhonchi   Abdomen- soft, Non tender, bowel sounds present  Extremities- No cyanosis, clubbing, edema  Neuro- No focal neurological deficit    Labs:  Lab Results   Component Value Date    WBC 8.85 12/23/2024    HGB 9.6 (L) 12/23/2024    HCT 30.4 (L) 12/23/2024    MCV 99 (H) 12/23/2024     12/23/2024     Lab Results   Component Value Date    SODIUM 139 12/23/2024    K 3.7 12/23/2024    CL  103 12/23/2024    CO2 28 12/23/2024    AGAP 8 12/23/2024    BUN 17 12/23/2024    CREATININE 1.00 12/23/2024    GLUC 86 12/23/2024    GLUF 100 (H) 10/03/2024    CALCIUM 9.0 12/23/2024    AST 9 (L) 12/23/2024    ALT 7 12/23/2024    ALKPHOS 38 12/23/2024    TP 6.5 12/23/2024    TBILI 0.43 12/23/2024    EGFR 55 12/23/2024           I spent 40 minutes on chart review, face to face counseling time, coordination of care and documentation.    Rosalinda Castro MD PhD

## 2024-12-27 ENCOUNTER — HOSPITAL ENCOUNTER (OUTPATIENT)
Dept: MRI IMAGING | Facility: HOSPITAL | Age: 74
Discharge: HOME | End: 2024-12-27
Attending: STUDENT IN AN ORGANIZED HEALTH CARE EDUCATION/TRAINING PROGRAM
Payer: MEDICARE

## 2024-12-27 DIAGNOSIS — C79.31 METASTASIS TO BRAIN (HCC): ICD-10-CM

## 2024-12-27 PROCEDURE — A9585 GADOBUTROL INJECTION: HCPCS | Performed by: STUDENT IN AN ORGANIZED HEALTH CARE EDUCATION/TRAINING PROGRAM

## 2024-12-27 PROCEDURE — 70553 MRI BRAIN STEM W/O & W/DYE: CPT

## 2024-12-27 RX ORDER — GADOBUTROL 604.72 MG/ML
10 INJECTION INTRAVENOUS
Status: COMPLETED | OUTPATIENT
Start: 2024-12-27 | End: 2024-12-27

## 2024-12-27 RX ADMIN — GADOBUTROL 10 ML: 604.72 INJECTION INTRAVENOUS at 12:14

## 2024-12-30 ENCOUNTER — HOSPITAL ENCOUNTER (INPATIENT)
Facility: HOSPITAL | Age: 74
LOS: 1 days | Discharge: HOME | DRG: 206 | End: 2025-01-02
Attending: EMERGENCY MEDICINE | Admitting: INTERNAL MEDICINE
Payer: MEDICARE

## 2024-12-30 ENCOUNTER — APPOINTMENT (EMERGENCY)
Dept: CT IMAGING | Facility: HOSPITAL | Age: 74
DRG: 206 | End: 2024-12-30
Payer: MEDICARE

## 2024-12-30 ENCOUNTER — APPOINTMENT (OUTPATIENT)
Dept: RADIOLOGY | Facility: HOSPITAL | Age: 74
DRG: 206 | End: 2024-12-30
Payer: MEDICARE

## 2024-12-30 ENCOUNTER — OFFICE VISIT (OUTPATIENT)
Dept: HEMATOLOGY ONCOLOGY | Facility: CLINIC | Age: 74
End: 2024-12-30
Payer: MEDICARE

## 2024-12-30 VITALS
RESPIRATION RATE: 16 BRPM | DIASTOLIC BLOOD PRESSURE: 72 MMHG | SYSTOLIC BLOOD PRESSURE: 128 MMHG | HEART RATE: 92 BPM | HEIGHT: 62 IN | TEMPERATURE: 98.6 F | WEIGHT: 117 LBS | OXYGEN SATURATION: 93 % | BODY MASS INDEX: 21.53 KG/M2

## 2024-12-30 DIAGNOSIS — D47.3 ESSENTIAL THROMBOCYTOSIS (HCC): ICD-10-CM
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
## 2024-12-30 DIAGNOSIS — C34.91 NSCLC OF RIGHT LUNG (HCC): ICD-10-CM

## 2024-12-30 DIAGNOSIS — R26.89 IMBALANCE: ICD-10-CM

## 2024-12-30 DIAGNOSIS — B59 PNEUMONIA OF RIGHT UPPER LOBE DUE TO PNEUMOCYSTIS JIROVECII (HCC): ICD-10-CM

## 2024-12-30 DIAGNOSIS — C34.90 LUNG CANCER METASTATIC TO BRAIN (HCC): Primary | ICD-10-CM

## 2024-12-30 DIAGNOSIS — C34.91 NSCLC OF RIGHT LUNG (HCC): Primary | ICD-10-CM

## 2024-12-30 DIAGNOSIS — B59 PNEUMONIA OF RIGHT UPPER LOBE DUE TO PNEUMOCYSTIS JIROVECII (HCC): Primary | ICD-10-CM

## 2024-12-30 DIAGNOSIS — C79.31 LUNG CANCER METASTATIC TO BRAIN (HCC): Primary | ICD-10-CM

## 2024-12-30 DIAGNOSIS — J18.9 HAP (HOSPITAL-ACQUIRED PNEUMONIA): ICD-10-CM

## 2024-12-30 DIAGNOSIS — C34.11 MALIGNANT NEOPLASM OF UPPER LOBE, RIGHT BRONCHUS OR LUNG (HCC): ICD-10-CM

## 2024-12-30 DIAGNOSIS — Z86.19 HISTORY OF PNEUMOCYSTIS JIROVECII PNEUMONIA: ICD-10-CM

## 2024-12-30 DIAGNOSIS — R06.00 DYSPNEA: ICD-10-CM

## 2024-12-30 LAB
2HR DELTA HS TROPONIN: 0 NG/L
ALBUMIN SERPL BCG-MCNC: 4.1 G/DL (ref 3.5–5)
ALP SERPL-CCNC: 44 U/L (ref 34–104)
ALT SERPL W P-5'-P-CCNC: 7 U/L (ref 7–52)
ANION GAP SERPL CALCULATED.3IONS-SCNC: 9 MMOL/L (ref 4–13)
APTT PPP: 41 SECONDS (ref 23–34)
AST SERPL W P-5'-P-CCNC: 10 U/L (ref 13–39)
BASOPHILS # BLD AUTO: 0.05 THOUSANDS/ΜL (ref 0–0.1)
BASOPHILS NFR BLD AUTO: 1 % (ref 0–1)
BILIRUB SERPL-MCNC: 0.27 MG/DL (ref 0.2–1)
BNP SERPL-MCNC: 52 PG/ML (ref 0–100)
BUN SERPL-MCNC: 20 MG/DL (ref 5–25)
CALCIUM SERPL-MCNC: 9.3 MG/DL (ref 8.4–10.2)
CARDIAC TROPONIN I PNL SERPL HS: 4 NG/L (ref ?–50)
CARDIAC TROPONIN I PNL SERPL HS: 4 NG/L (ref ?–50)
CHLORIDE SERPL-SCNC: 105 MMOL/L (ref 96–108)
CO2 SERPL-SCNC: 26 MMOL/L (ref 21–32)
CREAT SERPL-MCNC: 1.14 MG/DL (ref 0.6–1.3)
EOSINOPHIL # BLD AUTO: 0.03 THOUSAND/ΜL (ref 0–0.61)
EOSINOPHIL NFR BLD AUTO: 0 % (ref 0–6)
ERYTHROCYTE [DISTWIDTH] IN BLOOD BY AUTOMATED COUNT: 15.7 % (ref 11.6–15.1)
FLUAV AG UPPER RESP QL IA.RAPID: NEGATIVE
FLUBV AG UPPER RESP QL IA.RAPID: NEGATIVE
FUNGUS SPEC CULT: NORMAL
GFR SERPL CREATININE-BSD FRML MDRD: 47 ML/MIN/1.73SQ M
GLUCOSE SERPL-MCNC: 103 MG/DL (ref 65–140)
HCT VFR BLD AUTO: 32.9 % (ref 34.8–46.1)
HGB BLD-MCNC: 10.2 G/DL (ref 11.5–15.4)
IMM GRANULOCYTES # BLD AUTO: 0.17 THOUSAND/UL (ref 0–0.2)
IMM GRANULOCYTES NFR BLD AUTO: 2 % (ref 0–2)
INR PPP: 1.38 (ref 0.85–1.19)
LYMPHOCYTES # BLD AUTO: 0.2 THOUSANDS/ΜL (ref 0.6–4.47)
LYMPHOCYTES NFR BLD AUTO: 3 % (ref 14–44)
MCH RBC QN AUTO: 30.5 PG (ref 26.8–34.3)
MCHC RBC AUTO-ENTMCNC: 31 G/DL (ref 31.4–37.4)
MCV RBC AUTO: 99 FL (ref 82–98)
MONOCYTES # BLD AUTO: 0.37 THOUSAND/ΜL (ref 0.17–1.22)
MONOCYTES NFR BLD AUTO: 5 % (ref 4–12)
NEUTROPHILS # BLD AUTO: 7.29 THOUSANDS/ΜL (ref 1.85–7.62)
NEUTS SEG NFR BLD AUTO: 89 % (ref 43–75)
NRBC BLD AUTO-RTO: 0 /100 WBCS
PLATELET # BLD AUTO: 294 THOUSANDS/UL (ref 149–390)
PMV BLD AUTO: 8.5 FL (ref 8.9–12.7)
POTASSIUM SERPL-SCNC: 4.2 MMOL/L (ref 3.5–5.3)
PROCALCITONIN SERPL-MCNC: 0.07 NG/ML
PROT SERPL-MCNC: 7 G/DL (ref 6.4–8.4)
PROTHROMBIN TIME: 17.7 SECONDS (ref 12.3–15)
RBC # BLD AUTO: 3.34 MILLION/UL (ref 3.81–5.12)
SARS-COV+SARS-COV-2 AG RESP QL IA.RAPID: NEGATIVE
SODIUM SERPL-SCNC: 140 MMOL/L (ref 135–147)
WBC # BLD AUTO: 8.11 THOUSAND/UL (ref 4.31–10.16)

## 2024-12-30 PROCEDURE — 83880 ASSAY OF NATRIURETIC PEPTIDE: CPT | Performed by: PHYSICIAN ASSISTANT

## 2024-12-30 PROCEDURE — 87040 BLOOD CULTURE FOR BACTERIA: CPT | Performed by: PHYSICIAN ASSISTANT

## 2024-12-30 PROCEDURE — 93005 ELECTROCARDIOGRAM TRACING: CPT

## 2024-12-30 PROCEDURE — 71275 CT ANGIOGRAPHY CHEST: CPT

## 2024-12-30 PROCEDURE — 36415 COLL VENOUS BLD VENIPUNCTURE: CPT

## 2024-12-30 PROCEDURE — 99285 EMERGENCY DEPT VISIT HI MDM: CPT

## 2024-12-30 PROCEDURE — 80053 COMPREHEN METABOLIC PANEL: CPT | Performed by: EMERGENCY MEDICINE

## 2024-12-30 PROCEDURE — 71046 X-RAY EXAM CHEST 2 VIEWS: CPT

## 2024-12-30 PROCEDURE — 99215 OFFICE O/P EST HI 40 MIN: CPT | Performed by: INTERNAL MEDICINE

## 2024-12-30 PROCEDURE — 85730 THROMBOPLASTIN TIME PARTIAL: CPT | Performed by: PHYSICIAN ASSISTANT

## 2024-12-30 PROCEDURE — 85610 PROTHROMBIN TIME: CPT | Performed by: PHYSICIAN ASSISTANT

## 2024-12-30 PROCEDURE — 99285 EMERGENCY DEPT VISIT HI MDM: CPT | Performed by: PHYSICIAN ASSISTANT

## 2024-12-30 PROCEDURE — 84145 PROCALCITONIN (PCT): CPT | Performed by: PHYSICIAN ASSISTANT

## 2024-12-30 PROCEDURE — 85025 COMPLETE CBC W/AUTO DIFF WBC: CPT | Performed by: EMERGENCY MEDICINE

## 2024-12-30 PROCEDURE — 84484 ASSAY OF TROPONIN QUANT: CPT | Performed by: EMERGENCY MEDICINE

## 2024-12-30 PROCEDURE — 87811 SARS-COV-2 COVID19 W/OPTIC: CPT | Performed by: EMERGENCY MEDICINE

## 2024-12-30 PROCEDURE — 87804 INFLUENZA ASSAY W/OPTIC: CPT | Performed by: EMERGENCY MEDICINE

## 2024-12-30 RX ADMIN — SODIUM CHLORIDE 1000 ML: 0.9 INJECTION, SOLUTION INTRAVENOUS at 23:29

## 2024-12-30 RX ADMIN — CEFEPIME 2000 MG: 2 INJECTION, POWDER, FOR SOLUTION INTRAVENOUS at 23:41

## 2024-12-30 RX ADMIN — IOHEXOL 85 ML: 350 INJECTION, SOLUTION INTRAVENOUS at 20:56

## 2024-12-31 ENCOUNTER — APPOINTMENT (OUTPATIENT)
Dept: CT IMAGING | Facility: HOSPITAL | Age: 74
DRG: 206 | End: 2024-12-31
Payer: MEDICARE

## 2024-12-31 PROBLEM — C34.90 LUNG CANCER METASTATIC TO BRAIN (HCC): Status: ACTIVE | Noted: 2024-07-31

## 2024-12-31 PROBLEM — B59 PCP (PNEUMOCYSTIS JIROVECI PNEUMONIA) (HCC): Status: ACTIVE | Noted: 2024-12-31

## 2024-12-31 PROBLEM — Z86.711 HISTORY OF PULMONARY EMBOLISM: Status: ACTIVE | Noted: 2024-12-31

## 2024-12-31 PROBLEM — R06.09 DYSPNEA ON EXERTION: Status: ACTIVE | Noted: 2024-12-31

## 2024-12-31 PROBLEM — R26.89 IMBALANCE: Status: ACTIVE | Noted: 2024-12-31

## 2024-12-31 LAB
ANION GAP SERPL CALCULATED.3IONS-SCNC: 7 MMOL/L (ref 4–13)
ATRIAL RATE: 73 BPM
BUN SERPL-MCNC: 13 MG/DL (ref 5–25)
CALCIUM SERPL-MCNC: 8.5 MG/DL (ref 8.4–10.2)
CHLORIDE SERPL-SCNC: 108 MMOL/L (ref 96–108)
CO2 SERPL-SCNC: 24 MMOL/L (ref 21–32)
CREAT SERPL-MCNC: 0.91 MG/DL (ref 0.6–1.3)
ERYTHROCYTE [DISTWIDTH] IN BLOOD BY AUTOMATED COUNT: 15.7 % (ref 11.6–15.1)
GFR SERPL CREATININE-BSD FRML MDRD: 62 ML/MIN/1.73SQ M
GLUCOSE P FAST SERPL-MCNC: 81 MG/DL (ref 65–99)
GLUCOSE SERPL-MCNC: 81 MG/DL (ref 65–140)
HCT VFR BLD AUTO: 27.8 % (ref 34.8–46.1)
HGB BLD-MCNC: 8.6 G/DL (ref 11.5–15.4)
MCH RBC QN AUTO: 30.3 PG (ref 26.8–34.3)
MCHC RBC AUTO-ENTMCNC: 30.9 G/DL (ref 31.4–37.4)
MCV RBC AUTO: 98 FL (ref 82–98)
MYCOBACTERIUM SPEC CULT: NORMAL
P AXIS: 72 DEGREES
PLATELET # BLD AUTO: 216 THOUSANDS/UL (ref 149–390)
PMV BLD AUTO: 8.3 FL (ref 8.9–12.7)
POTASSIUM SERPL-SCNC: 3.8 MMOL/L (ref 3.5–5.3)
PR INTERVAL: 156 MS
PROCALCITONIN SERPL-MCNC: 0.06 NG/ML
QRS AXIS: 63 DEGREES
QRSD INTERVAL: 62 MS
QT INTERVAL: 382 MS
QTC INTERVAL: 420 MS
RBC # BLD AUTO: 2.84 MILLION/UL (ref 3.81–5.12)
RHODAMINE-AURAMINE STN SPEC: NORMAL
SODIUM SERPL-SCNC: 139 MMOL/L (ref 135–147)
T WAVE AXIS: 62 DEGREES
VENTRICULAR RATE: 73 BPM
WBC # BLD AUTO: 5.13 THOUSAND/UL (ref 4.31–10.16)

## 2024-12-31 PROCEDURE — 74160 CT ABDOMEN W/CONTRAST: CPT

## 2024-12-31 PROCEDURE — 99223 1ST HOSP IP/OBS HIGH 75: CPT | Performed by: INTERNAL MEDICINE

## 2024-12-31 PROCEDURE — 99215 OFFICE O/P EST HI 40 MIN: CPT | Performed by: INTERNAL MEDICINE

## 2024-12-31 PROCEDURE — 84145 PROCALCITONIN (PCT): CPT

## 2024-12-31 PROCEDURE — 80048 BASIC METABOLIC PNL TOTAL CA: CPT

## 2024-12-31 PROCEDURE — 85027 COMPLETE CBC AUTOMATED: CPT

## 2024-12-31 PROCEDURE — 36415 COLL VENOUS BLD VENIPUNCTURE: CPT

## 2024-12-31 PROCEDURE — 93010 ELECTROCARDIOGRAM REPORT: CPT | Performed by: INTERNAL MEDICINE

## 2024-12-31 RX ORDER — DEXAMETHASONE 2 MG/1
2 TABLET ORAL EVERY EVENING
Status: DISCONTINUED | OUTPATIENT
Start: 2025-01-14 | End: 2025-01-02 | Stop reason: HOSPADM

## 2024-12-31 RX ORDER — AMLODIPINE BESYLATE 5 MG/1
5 TABLET ORAL DAILY
Status: DISCONTINUED | OUTPATIENT
Start: 2024-12-31 | End: 2025-01-02 | Stop reason: HOSPADM

## 2024-12-31 RX ORDER — LEVOTHYROXINE SODIUM 50 UG/1
50 TABLET ORAL
Status: DISCONTINUED | OUTPATIENT
Start: 2024-12-31 | End: 2025-01-02 | Stop reason: HOSPADM

## 2024-12-31 RX ORDER — DEXAMETHASONE SODIUM PHOSPHATE 4 MG/ML
4 INJECTION, SOLUTION INTRA-ARTICULAR; INTRALESIONAL; INTRAMUSCULAR; INTRAVENOUS; SOFT TISSUE EVERY 8 HOURS SCHEDULED
Status: DISCONTINUED | OUTPATIENT
Start: 2024-12-31 | End: 2025-01-02 | Stop reason: HOSPADM

## 2024-12-31 RX ORDER — ACETAMINOPHEN 325 MG/1
650 TABLET ORAL EVERY 6 HOURS PRN
Status: DISCONTINUED | OUTPATIENT
Start: 2024-12-31 | End: 2025-01-02 | Stop reason: HOSPADM

## 2024-12-31 RX ORDER — DEXAMETHASONE 4 MG/1
4 TABLET ORAL EVERY MORNING
Status: DISCONTINUED | OUTPATIENT
Start: 2025-01-14 | End: 2025-01-02 | Stop reason: HOSPADM

## 2024-12-31 RX ORDER — PANTOPRAZOLE SODIUM 40 MG/1
40 TABLET, DELAYED RELEASE ORAL
Status: DISCONTINUED | OUTPATIENT
Start: 2024-12-31 | End: 2025-01-02 | Stop reason: HOSPADM

## 2024-12-31 RX ORDER — FOLIC ACID 1 MG/1
1 TABLET ORAL DAILY
Status: DISCONTINUED | OUTPATIENT
Start: 2024-12-31 | End: 2025-01-02 | Stop reason: HOSPADM

## 2024-12-31 RX ORDER — DEXAMETHASONE 4 MG/1
4 TABLET ORAL EVERY 12 HOURS SCHEDULED
Status: DISCONTINUED | OUTPATIENT
Start: 2025-01-07 | End: 2025-01-02 | Stop reason: HOSPADM

## 2024-12-31 RX ORDER — DEXAMETHASONE 2 MG/1
2 TABLET ORAL EVERY 12 HOURS SCHEDULED
Status: DISCONTINUED | OUTPATIENT
Start: 2025-01-21 | End: 2025-01-02 | Stop reason: HOSPADM

## 2024-12-31 RX ORDER — LEVETIRACETAM 500 MG/1
1000 TABLET ORAL EVERY 12 HOURS SCHEDULED
Status: DISCONTINUED | OUTPATIENT
Start: 2024-12-31 | End: 2025-01-02 | Stop reason: HOSPADM

## 2024-12-31 RX ORDER — SULFAMETHOXAZOLE AND TRIMETHOPRIM 800; 160 MG/1; MG/1
1 TABLET ORAL 3 TIMES WEEKLY
Status: DISCONTINUED | OUTPATIENT
Start: 2025-01-01 | End: 2025-01-02 | Stop reason: HOSPADM

## 2024-12-31 RX ORDER — SENNOSIDES 8.6 MG
1 TABLET ORAL DAILY
Status: DISCONTINUED | OUTPATIENT
Start: 2024-12-31 | End: 2025-01-02 | Stop reason: HOSPADM

## 2024-12-31 RX ORDER — GABAPENTIN 300 MG/1
300 CAPSULE ORAL
Status: DISCONTINUED | OUTPATIENT
Start: 2024-12-31 | End: 2025-01-02 | Stop reason: HOSPADM

## 2024-12-31 RX ORDER — GABAPENTIN 100 MG/1
100 CAPSULE ORAL 2 TIMES DAILY
Status: DISCONTINUED | OUTPATIENT
Start: 2024-12-31 | End: 2025-01-02 | Stop reason: HOSPADM

## 2024-12-31 RX ORDER — PREDNISONE 20 MG/1
20 TABLET ORAL DAILY
Status: DISCONTINUED | OUTPATIENT
Start: 2024-12-31 | End: 2024-12-31

## 2024-12-31 RX ADMIN — SODIUM CHLORIDE 1000 MG: 0.9 INJECTION, SOLUTION INTRAVENOUS at 00:40

## 2024-12-31 RX ADMIN — DEXAMETHASONE SODIUM PHOSPHATE 4 MG: 4 INJECTION INTRA-ARTICULAR; INTRALESIONAL; INTRAMUSCULAR; INTRAVENOUS; SOFT TISSUE at 21:42

## 2024-12-31 RX ADMIN — LEVOTHYROXINE SODIUM 50 MCG: 50 TABLET ORAL at 05:47

## 2024-12-31 RX ADMIN — SENNOSIDES 8.6 MG: 8.6 TABLET, FILM COATED ORAL at 09:28

## 2024-12-31 RX ADMIN — FOLIC ACID 1 MG: 1 TABLET ORAL at 09:28

## 2024-12-31 RX ADMIN — RIVAROXABAN 20 MG: 20 TABLET, FILM COATED ORAL at 09:28

## 2024-12-31 RX ADMIN — ACETAMINOPHEN 650 MG: 325 TABLET, FILM COATED ORAL at 12:05

## 2024-12-31 RX ADMIN — IOHEXOL 85 ML: 350 INJECTION, SOLUTION INTRAVENOUS at 20:37

## 2024-12-31 RX ADMIN — CYANOCOBALAMIN TAB 500 MCG 1000 MCG: 500 TAB at 09:28

## 2024-12-31 RX ADMIN — LEVETIRACETAM 1000 MG: 250 TABLET, FILM COATED ORAL at 21:42

## 2024-12-31 RX ADMIN — ACETAMINOPHEN 650 MG: 325 TABLET, FILM COATED ORAL at 18:39

## 2024-12-31 RX ADMIN — GABAPENTIN 300 MG: 300 CAPSULE ORAL at 21:42

## 2024-12-31 RX ADMIN — GABAPENTIN 100 MG: 100 CAPSULE ORAL at 11:49

## 2024-12-31 RX ADMIN — DEXAMETHASONE SODIUM PHOSPHATE 4 MG: 4 INJECTION INTRA-ARTICULAR; INTRALESIONAL; INTRAMUSCULAR; INTRAVENOUS; SOFT TISSUE at 16:33

## 2024-12-31 RX ADMIN — GABAPENTIN 100 MG: 100 CAPSULE ORAL at 05:47

## 2024-12-31 RX ADMIN — LEVETIRACETAM 1000 MG: 250 TABLET, FILM COATED ORAL at 09:28

## 2024-12-31 RX ADMIN — PREDNISONE 20 MG: 20 TABLET ORAL at 09:28

## 2024-12-31 RX ADMIN — PANTOPRAZOLE SODIUM 40 MG: 40 TABLET, DELAYED RELEASE ORAL at 05:46

## 2024-12-31 NOTE — ED PROVIDER NOTES
Time reflects when diagnosis was documented in both MDM as applicable and the Disposition within this note       Time User Action Codes Description Comment    12/30/2024 11:31 PM Medhat Patten [J18.9,  Y95] HAP (hospital-acquired pneumonia)     12/30/2024 11:33 PM Medhat Patten [Z86.19] History of Pneumocystis jirovecii pneumonia     12/30/2024 11:34 PM Medhat Patten [C34.91] NSCLC of right lung (HCC)     12/30/2024 11:34 PM Medhat Patten [R06.00] Dyspnea           ED Disposition       ED Disposition   Admit    Condition   Stable    Date/Time   Mon Dec 30, 2024 11:34 PM    Comment   Case was discussed with slim resident and the patient's admission status was agreed to be Admission Status: observation status to the service of Dr. Rios, internal medicine .               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     HAP with recent hospital admission 11/24/2024. Patient is a 74-year-old female with a history of malignant neoplasm of upper lobe right bronchus of lung pneumonia of right upper lobe due to pneumocystitis Jirovecii, currently on Bactrim and prednisone; completed Atovaquone 21 days. History of pulmonary embolism currently on Xarelto. Patient states that she had started feeling shortness of breath after Atovaquone treatment on 12/23/2024.     CT chest PE study with no new pulmonary embolism, worsening right lobular opacities. SpO2 saturation 93% respirations 16, hematology stable, ECG normal sinus rhythm negative troponin, negative COVID/flu, WBC 8.11 procalcitonin 0.07. BNP negative-52 patient expressed dyspnea symptomatology over the past 3  days. Patient was recommended to come to the ED by heme oncology at this time for evaluation earlier today.    Discussion with internal medicine team with both agreed to place patient on inpatient observational status on the care of Dr. Christine, internal medicine  Patient with verbal understandable clinical laboratory and imaging findings, admission instructions and verbalized agreement with patient current treatment plan with teach back  Patient with verbal understanding of all clinical laboratory and imaging findings, admission instructions and verbalized agreement with patient current treatment plan with teach back.    Amount and/or Complexity of Data Reviewed  Labs: ordered.  Radiology: ordered and independent interpretation performed. Decision-making details documented in ED Course.  ECG/medicine tests: ordered and independent interpretation performed. Decision-making details documented in ED Course.    Risk  Prescription drug management.  Decision regarding hospitalization.        ED Course as of 12/31/24 0732   Mon Dec 30, 2024   2040 Patient hemodynamically stable at time of my initial evaluation; vital signs stable at this time, care ongoing.       Medications   rivaroxaban (XARELTO) tablet 20 mg (has no administration in time range)   amLODIPine (NORVASC) tablet 5 mg (has no administration in time range)   folic acid (FOLVITE) tablet 1 mg (has no administration in time range)   gabapentin (NEURONTIN) capsule 100 mg (100 mg Oral Given 12/31/24 0547)     And   gabapentin (NEURONTIN) capsule 300 mg (has no administration in time range)   levETIRAcetam (KEPPRA) tablet 1,000 mg (has no administration in time range)   levothyroxine tablet 50 mcg (50 mcg Oral Given 12/31/24 0547)   pantoprazole (PROTONIX) EC tablet 40 mg (40 mg Oral Given 12/31/24 0546)   predniSONE tablet 20 mg (has no administration in time range)   senna (SENOKOT) tablet 8.6 mg (has no administration in time range)    sulfamethoxazole-trimethoprim (BACTRIM DS) 800-160 mg per tablet 1 tablet (has no administration in time range)   cyanocobalamin (VITAMIN B-12) tablet 1,000 mcg (has no administration in time range)   iohexol (OMNIPAQUE) 350 MG/ML injection (MULTI-DOSE) 85 mL (85 mL Intravenous Given 12/30/24 2056)   vancomycin (VANCOCIN) 1000 mg in sodium chloride 0.9% 250 mL IVPB (1,000 mg Intravenous Given 12/31/24 0040)   cefepime (MAXIPIME) 2 g/50 mL dextrose IVPB (0 mg Intravenous Stopped 12/31/24 0011)   sodium chloride 0.9 % bolus 1,000 mL (0 mL Intravenous Stopped 12/31/24 0029)       ED Risk Strat Scores   HEART Risk Score      Flowsheet Row Most Recent Value   Heart Score Risk Calculator    History 0 Filed at: 12/31/2024 0731   ECG 0 Filed at: 12/31/2024 0731   Age 2 Filed at: 12/31/2024 0731   Risk Factors 1 Filed at: 12/31/2024 0731   Troponin 0 Filed at: 12/31/2024 0731   HEART Score 3 Filed at: 12/31/2024 0731          HEART Risk Score      Flowsheet Row Most Recent Value   Heart Score Risk Calculator    History 0 Filed at: 12/31/2024 0731   ECG 0 Filed at: 12/31/2024 0731   Age 2 Filed at: 12/31/2024 0731   Risk Factors 1 Filed at: 12/31/2024 0731   Troponin 0 Filed at: 12/31/2024 0731   HEART Score 3 Filed at: 12/31/2024 0731                            SBIRT 22yo+      Flowsheet Row Most Recent Value   Initial Alcohol Screen: US AUDIT-C     1. How often do you have a drink containing alcohol? 0 Filed at: 12/30/2024 1440   2. How many drinks containing alcohol do you have on a typical day you are drinking?  0 Filed at: 12/30/2024 1440   3a. Male UNDER 65: How often do you have five or more drinks on one occasion? 0 Filed at: 12/30/2024 1440   3b. FEMALE Any Age, or MALE 65+: How often do you have 4 or more drinks on one occassion? 0 Filed at: 12/30/2024 1440   Audit-C Score 0 Filed at: 12/30/2024 1440   ALEXIS: How many times in the past year have you...    Used an illegal drug or used a prescription medication for  "non-medical reasons? Never Filed at: 12/30/2024 1440                            History of Present Illness       Chief Complaint   Patient presents with    Shortness of Breath     Recently was treated with abx and steroids for pneumonia. The abx are done but steroids continue. She feels worse SOB and CAIN.      Patient is a 74-year-old female with a history of malignant neoplasm of upper lobe right bronchus or lung, pneumonia of right upper lobe due to pneumocystitis jirovecii, hypertension, hyperlipidemia, surgical history of appendectomy that presents to the emergency department with progressive shortness of breath symptoms over the past 3 days.  Patient has associate symptomatology of intermittent dry nonproductive cough beginning with the current new presentation of shortness of breath symptoms.  Patient was recommended to evaluated in the Emergency Department at this time heme oncology for evaluation secondary to diagnosis of pneumocystitis jirovecii, currently on prednisone, Bactrim.  Patient with recent hospital admission 11/24/2024-12/6/2024 PCP pneumonia, PE now on Xarelto, completed Atovaquone 21 days, completed 12/23/2024 now currently on Bactrim, current taper prednisone with consideration of carboplatin/almurtide later this month per heme oncology note 12/30/2024.  Patient states that she started feeling short of breath after Atovaquone treatment completion on 12/23/2024.  MRI Brain BT w wo contrast- impression - \"MRI brain PT with and without contrast with \"multiple enhancing metastatic lesions...\" Patient denies palliative factors with provocative factors of exertion.  Patient denies noneffective treatment.  Patient denies fevers, chills, nausea, vomiting, diarrhea, constipation, and urinary symptoms.  Patient denies recent fall and recent trauma.  Patient denies sick contacts recent travel.  Patient denies chest pain and abdominal pain.      Past Medical History:   Diagnosis Date    Allergic 2022    " Allergic to neomiacin and cephalexin    Cancer (HCC)     lung cancer    Cancer (HCC)     mets to brain    Cancer (HCC)     perianal mass    Cataract 2023    Due to have durgery 2024    Essential thrombocytosis (HCC)     controlled with medication    Hyperlipidemia     Hypertension     Mass in chest     Nodular goiter     Osteoporosis     Pneumonia Oct 16, 2023    Also  2024    Psoriasis     SIRS (systemic inflammatory response syndrome) (HCC) 2024      Past Surgical History:   Procedure Laterality Date    APPENDECTOMY      BREAST CYST EXCISION Right     COLONOSCOPY  10/31/2018    DXA PROCEDURE (HISTORICAL)  2017    IR BIOPSY OTHER  2024    IR LUMBAR PUNCTURE  2024    MAMMO (HISTORICAL)      8-24-18    MAMMO NEEDLE LOCALIZATION RIGHT (ALL INC) Right 2009    SKIN LESION EXCISION      bridge of nose      Family History   Problem Relation Age of Onset    Stroke Mother     Depression Mother             Hypertension Mother     Hearing loss Mother     Tuberculosis Father     Cancer Brother         lung    Tuberculosis Brother     Coronary artery disease Brother     Hypertension Brother     No Known Problems Daughter     No Known Problems Daughter     No Known Problems Maternal Grandmother     No Known Problems Maternal Grandfather     No Known Problems Paternal Grandmother     No Known Problems Paternal Grandfather     No Known Problems Maternal Aunt     No Known Problems Maternal Aunt     No Known Problems Maternal Aunt     No Known Problems Maternal Aunt     No Known Problems Paternal Aunt     Cancer Brother         Throat canver      Social History     Tobacco Use    Smoking status: Former     Current packs/day: 1.00     Average packs/day: 1 pack/day for 59.0 years (59.0 ttl pk-yrs)     Types: Cigarettes     Start date:      Passive exposure: Past    Smokeless tobacco: Never    Tobacco comments:     Quit    Vaping Use    Vaping status: Never Used   Substance  Use Topics    Alcohol use: Yes     Alcohol/week: 5.0 standard drinks of alcohol     Types: 5 Glasses of wine per week    Drug use: Never      E-Cigarette/Vaping    E-Cigarette Use Never User       E-Cigarette/Vaping Substances    Nicotine No     THC No     CBD No     Flavoring No     Other No     Unknown No       I have reviewed and agree with the history as documented.       History provided by:  Patient   used: No    Shortness of Breath  Associated symptoms: no abdominal pain, no chest pain, no cough, no ear pain, no fever, no headaches, no neck pain, no rash, no sore throat and no vomiting        Review of Systems   Constitutional:  Negative for activity change, appetite change, chills and fever.   HENT:  Negative for congestion, ear pain, postnasal drip, rhinorrhea, sinus pressure, sinus pain, sore throat and tinnitus.    Eyes:  Negative for photophobia, pain and visual disturbance.   Respiratory:  Positive for shortness of breath. Negative for cough and chest tightness.    Cardiovascular:  Negative for chest pain and palpitations.   Gastrointestinal:  Negative for abdominal pain, constipation, diarrhea, nausea and vomiting.   Genitourinary:  Negative for difficulty urinating, dysuria, flank pain, frequency, hematuria and urgency.   Musculoskeletal:  Negative for arthralgias, back pain, gait problem, neck pain and neck stiffness.   Skin:  Negative for color change, pallor and rash.   Allergic/Immunologic: Negative for environmental allergies and food allergies.   Neurological:  Negative for dizziness, seizures, syncope, weakness, numbness and headaches.   Psychiatric/Behavioral:  Negative for confusion.    All other systems reviewed and are negative.          Objective       ED Triage Vitals   Temperature Pulse Blood Pressure Respirations SpO2 Patient Position - Orthostatic VS   12/30/24 1439 12/30/24 1439 12/30/24 1439 12/30/24 1439 12/30/24 1439 12/30/24 1439   98.8 °F (37.1 °C) 90 151/82  "17 97 % Sitting      Temp Source Heart Rate Source BP Location FiO2 (%) Pain Score    12/30/24 1439 12/30/24 1439 12/30/24 1439 -- 12/31/24 0259    Oral Monitor Right arm  No Pain      Vitals      Date and Time Temp Pulse SpO2 Resp BP Pain Score FACES Pain Rating User   12/31/24 0722 98.5 °F (36.9 °C) 69 94 % 18 157/70 -- -- SK   12/31/24 0400 -- 69 94 % 18 -- -- -- SM   12/31/24 0259 -- -- -- -- -- No Pain -- MF   12/30/24 2145 -- 71 93 % 16 136/70 -- -- CB   12/30/24 1930 -- 88 97 % 18 146/72 -- -- CB   12/30/24 1439 98.8 °F (37.1 °C) 90 97 % 17 151/82 -- -- KK            Physical Exam  Vitals and nursing note reviewed.   Constitutional:       General: She is awake.      Appearance: Normal appearance. She is well-developed and normal weight. She is not ill-appearing, toxic-appearing or diaphoretic.      Comments: /72 (BP Location: Left arm)   Pulse 88   Temp 98.8 °F (37.1 °C) (Oral)   Resp 18   Ht 5' 2\" (1.575 m)   Wt 53.1 kg (117 lb)   SpO2 97%   BMI 21.40 kg/m²      HENT:      Head: Normocephalic and atraumatic.      Jaw: There is normal jaw occlusion.      Right Ear: Hearing, tympanic membrane and external ear normal. No decreased hearing noted. No drainage, swelling or tenderness. No mastoid tenderness.      Left Ear: Hearing, tympanic membrane and external ear normal. No decreased hearing noted. No drainage, swelling or tenderness. No mastoid tenderness.      Nose: Nose normal.      Mouth/Throat:      Lips: Pink.      Mouth: Mucous membranes are moist.      Pharynx: Oropharynx is clear. Uvula midline.   Eyes:      General: Lids are normal. Vision grossly intact. Gaze aligned appropriately.         Right eye: No discharge.         Left eye: No discharge.      Extraocular Movements: Extraocular movements intact.      Conjunctiva/sclera: Conjunctivae normal.      Pupils: Pupils are equal, round, and reactive to light.   Neck:      Vascular: No JVD.      Trachea: Trachea and phonation normal. No " tracheal tenderness or tracheal deviation.   Cardiovascular:      Rate and Rhythm: Normal rate and regular rhythm.      Pulses: Normal pulses.           Radial pulses are 2+ on the right side and 2+ on the left side.        Posterior tibial pulses are 2+ on the right side and 2+ on the left side.      Heart sounds: Normal heart sounds.   Pulmonary:      Effort: Pulmonary effort is normal.      Breath sounds: Normal breath sounds and air entry. No stridor. No decreased breath sounds, wheezing, rhonchi or rales.   Abdominal:      General: Abdomen is flat. Bowel sounds are normal. There is no distension.      Palpations: Abdomen is soft. Abdomen is not rigid.      Tenderness: There is no abdominal tenderness. There is no guarding or rebound.   Musculoskeletal:         General: Normal range of motion.      Cervical back: Full passive range of motion without pain, normal range of motion and neck supple. No rigidity. No spinous process tenderness or muscular tenderness. Normal range of motion.   Feet:      Right foot:      Toenail Condition: Right toenails are normal.      Left foot:      Toenail Condition: Left toenails are normal.   Lymphadenopathy:      Head:      Right side of head: No submental, submandibular, tonsillar, preauricular, posterior auricular or occipital adenopathy.      Left side of head: No submental, submandibular, tonsillar, preauricular, posterior auricular or occipital adenopathy.      Cervical: No cervical adenopathy.      Right cervical: No superficial, deep or posterior cervical adenopathy.     Left cervical: No superficial, deep or posterior cervical adenopathy.   Skin:     General: Skin is warm.      Capillary Refill: Capillary refill takes less than 2 seconds.      Findings: No rash.   Neurological:      General: No focal deficit present.      Mental Status: She is alert and oriented to person, place, and time. Mental status is at baseline.      GCS: GCS eye subscore is 4. GCS verbal subscore  is 5. GCS motor subscore is 6.      Sensory: No sensory deficit.   Psychiatric:         Attention and Perception: Attention normal.         Mood and Affect: Mood normal.         Speech: Speech normal.         Behavior: Behavior normal. Behavior is cooperative.         Thought Content: Thought content normal.         Judgment: Judgment normal.         Results Reviewed       Procedure Component Value Units Date/Time    Procalcitonin [[id number]]  (Normal) Collected: 12/31/24 0553    Lab Status: Final result Specimen: Blood from Arm, Right Updated: 12/31/24 0632     Procalcitonin 0.06 ng/ml     Basic metabolic panel [[id number]] Collected: 12/31/24 0553    Lab Status: Final result Specimen: Blood from Arm, Right Updated: 12/31/24 0631     Sodium 139 mmol/L      Potassium 3.8 mmol/L      Chloride 108 mmol/L      CO2 24 mmol/L      ANION GAP 7 mmol/L      BUN 13 mg/dL      Creatinine 0.91 mg/dL      Glucose 81 mg/dL      Glucose, Fasting 81 mg/dL      Calcium 8.5 mg/dL      eGFR 62 ml/min/1.73sq m     Narrative:      National Kidney Disease Foundation guidelines for Chronic Kidney Disease (CKD):     Stage 1 with normal or high GFR (GFR > 90 mL/min/1.73 square meters)    Stage 2 Mild CKD (GFR = 60-89 mL/min/1.73 square meters)    Stage 3A Moderate CKD (GFR = 45-59 mL/min/1.73 square meters)    Stage 3B Moderate CKD (GFR = 30-44 mL/min/1.73 square meters)    Stage 4 Severe CKD (GFR = 15-29 mL/min/1.73 square meters)    Stage 5 End Stage CKD (GFR <15 mL/min/1.73 square meters)  Note: GFR calculation is accurate only with a steady state creatinine    CBC (With Platelets) [[id number]]  (Abnormal) Collected: 12/31/24 0553    Lab Status: Final result Specimen: Blood from Arm, Right Updated: 12/31/24 0603     WBC 5.13 Thousand/uL      RBC 2.84 Million/uL      Hemoglobin 8.6 g/dL      Hematocrit 27.8 %      MCV 98 fL      MCH 30.3 pg      MCHC 30.9 g/dL      RDW 15.7 %      Platelets 216 Thousands/uL      MPV 8.3 fL     Blood  culture #2 [[id number]] Collected: 12/30/24 2326    Lab Status: In process Specimen: Blood from Arm, Right Updated: 12/30/24 2333    Blood culture #1 [[id number]] Collected: 12/30/24 2326    Lab Status: In process Specimen: Blood from Arm, Left Updated: 12/30/24 2333    Protime-INR [[id number]]  (Abnormal) Collected: 12/30/24 2050    Lab Status: Final result Specimen: Blood from Arm, Right Updated: 12/30/24 2112     Protime 17.7 seconds      INR 1.38    Narrative:      INR Therapeutic Range    Indication                                             INR Range      Atrial Fibrillation                                               2.0-3.0  Hypercoagulable State                                    2.0.2.3  Left Ventricular Asist Device                            2.0-3.0  Mechanical Heart Valve                                  -    Aortic(with afib, MI, embolism, HF, LA enlargement,    and/or coagulopathy)                                     2.0-3.0 (2.5-3.5)     Mitral                                                             2.5-3.5  Prosthetic/Bioprosthetic Heart Valve               2.0-3.0  Venous thromboembolism (VTE: VT, PE        2.0-3.0    APTT [[id number]]  (Abnormal) Collected: 12/30/24 2050    Lab Status: Final result Specimen: Blood from Arm, Right Updated: 12/30/24 2112     PTT 41 seconds     Procalcitonin [[id number]]  (Normal) Collected: 12/30/24 1927    Lab Status: Final result Specimen: Blood from Arm, Right Updated: 12/30/24 2034     Procalcitonin 0.07 ng/ml     B-Type Natriuretic Peptide(BNP) [[id number]]  (Normal) Collected: 12/30/24 1442    Lab Status: Final result Specimen: Blood from Arm, Right Updated: 12/30/24 2033     BNP 52 pg/mL     HS Troponin I 2hr [[id number]]  (Normal) Collected: 12/30/24 1927    Lab Status: Final result Specimen: Blood from Arm, Right Updated: 12/30/24 1959     hs TnI 2hr 4 ng/L      Delta 2hr hsTnI 0 ng/L     HS Troponin 0hr (reflex protocol) [[id number]]  (Normal) Collected:  12/30/24 1442    Lab Status: Final result Specimen: Blood from Arm, Right Updated: 12/30/24 1516     hs TnI 0hr 4 ng/L     FLU/COVID Rapid Antigen (30 min. TAT) - Preferred screening test in ED [[id number]]  (Normal) Collected: 12/30/24 1442    Lab Status: Final result Specimen: Nares from Nose Updated: 12/30/24 1509     SARS COV Rapid Antigen Negative     Influenza A Rapid Antigen Negative     Influenza B Rapid Antigen Negative    Narrative:      This test has been performed using the [employer] Ghada 2 FLU+SARS Antigen test under the Emergency Use Authorization (EUA). This test has been validated by the  and verified by the performing laboratory. The Ghada uses lateral flow immunofluorescent sandwich assay to detect SARS-COV, Influenza A and Influenza B Antigen.     The [employer] Ghada 2 SARS Antigen test does not differentiate between SARS-CoV and SARS-CoV-2.     Negative results are presumptive and may be confirmed with a molecular assay, if necessary, for patient management. Negative results do not rule out SARS-CoV-2 or influenza infection and should not be used as the sole basis for treatment or patient management decisions. A negative test result may occur if the level of antigen in a sample is below the limit of detection of this test.     Positive results are indicative of the presence of viral antigens, but do not rule out bacterial infection or co-infection with other viruses.     All test results should be used as an adjunct to clinical observations and other information available to the provider.    FOR PEDIATRIC PATIENTS - copy/paste COVID Guidelines URL to browser: [email or web address]    Comprehensive metabolic panel [[id number]]  (Abnormal) Collected: 12/30/24 1442    Lab Status: Final result Specimen: Blood from Arm, Right Updated: 12/30/24 1509     Sodium 140 mmol/L      Potassium 4.2 mmol/L      Chloride 105 mmol/L      CO2 26 mmol/L       ANION GAP 9 mmol/L      BUN 20 mg/dL      Creatinine 1.14 mg/dL      Glucose 103 mg/dL      Calcium 9.3 mg/dL      AST 10 U/L      ALT 7 U/L      Alkaline Phosphatase 44 U/L      Total Protein 7.0 g/dL      Albumin 4.1 g/dL      Total Bilirubin 0.27 mg/dL      eGFR 47 ml/min/1.73sq m     Narrative:      National Kidney Disease Foundation guidelines for Chronic Kidney Disease (CKD):     Stage 1 with normal or high GFR (GFR > 90 mL/min/1.73 square meters)    Stage 2 Mild CKD (GFR = 60-89 mL/min/1.73 square meters)    Stage 3A Moderate CKD (GFR = 45-59 mL/min/1.73 square meters)    Stage 3B Moderate CKD (GFR = 30-44 mL/min/1.73 square meters)    Stage 4 Severe CKD (GFR = 15-29 mL/min/1.73 square meters)    Stage 5 End Stage CKD (GFR <15 mL/min/1.73 square meters)  Note: GFR calculation is accurate only with a steady state creatinine    CBC and differential [[id number]]  (Abnormal) Collected: 12/30/24 1442    Lab Status: Final result Specimen: Blood from Arm, Right Updated: 12/30/24 1500     WBC 8.11 Thousand/uL      RBC 3.34 Million/uL      Hemoglobin 10.2 g/dL      Hematocrit 32.9 %      MCV 99 fL      MCH 30.5 pg      MCHC 31.0 g/dL      RDW 15.7 %      MPV 8.5 fL      Platelets 294 Thousands/uL      nRBC 0 /100 WBCs      Segmented % 89 %      Immature Grans % 2 %      Lymphocytes % 3 %      Monocytes % 5 %      Eosinophils Relative 0 %      Basophils Relative 1 %      Absolute Neutrophils 7.29 Thousands/µL      Absolute Immature Grans 0.17 Thousand/uL      Absolute Lymphocytes 0.20 Thousands/µL      Absolute Monocytes 0.37 Thousand/µL      Eosinophils Absolute 0.03 Thousand/µL      Basophils Absolute 0.05 Thousands/µL             CTA chest pe study   ED Interpretation by Medhat Patten PA-C (12/30 2252)   Celio Martin MD  [phone]    12/30/2024 STAT    Narrative & Impression  CTA - CHEST WITH IV CONTRAST - PULMONARY ANGIOGRAM     INDICATION: shortness of breath; currently on prednisone, history of lung  cancer, history of 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     COMPARISON: CTA PE study 11/24/2024.     TECHNIQUE: CTA examination of the chest was performed using angiographic technique according to a protocol specifically tailored to evaluate for pulmonary embolism. Multiplanar 2D reformatted images were created from the source data. In addition, coronal   3D MIP postprocessing was performed on the acquisition scanner.     Radiation dose length product (DLP) for this visit: 191 mGy-cm . This examination, like all CT scans performed in the [facility], was performed utilizing techniques to minimize radiation dose exposure, including the use of iterative   reconstruction and automated exposure control.     IV Contrast: 85 mL of iohexol (OMN   IPAQUE)     FINDINGS:     PULMONARY ARTERIAL TREE: Interval resolution of right middle lobe/right lower lobe pulmonary embolism. Interval near complete resolution of previously seen left lower lobe pulmonary embolism with questionable minimal residual linear thrombus in this   region (301/109).        LUNGS: Spiculated right upper lobe nodule measures 2.5 x 2.4 cm (3/62), slightly larger than on prior exam. Similar appearance of right upper lobe groundglass opacities with interval increase in opacities in the superior right lower lobe. Cluster of   micronodules in the medial right upper lobe (304/) may be infectious or inflammatory. Additional groundglass and reticular opacities in the left upper lobe. New lingular 6 mm nodule (304/173) and left lower lobe 7 mm nodule (304/170). Mild   emphysema.     PLEURA: Left calcified basilar pleural plaque (301/158).     HEART/GREAT VESSELS: Heart is normal in size. Coronary artery calcifications. No thoracic aortic aneurysm.     MEDIASTIN   UM AND SUDEEP: Unremarkable.     CHEST WALL AND LOWER NECK: Unremarkable.     VISUALIZED STRUCTURES IN THE UPPER ABDOMEN: Few calculi within the partially  imaged right kidney measuring up to 3 mm without hydronephrosis in the visualized portion of the right kidney. Exophytic lesion arising from the upper pole of the right kidney   measuring approximately 2.6 cm (602/112) in the region in which metastasis was suspected on prior CT.     OSSEOUS STRUCTURES: Degenerative changes of the spine.     IMPRESSION:     No new pulmonary embolism. Resolution of previously seen right-sided pulmonary embolism. Near complete resolution of previously seen left lower lobe pulmonary embolism with questionable minimal residual linear thrombus in this region.     Spiculated right upper lobe nodule measures slightly larger than on prior exam. Increase in right lung groundglass opacities which may represent worsening pneumonia and/or malignancy.     Incompletely evaluated right upper pole lesion appears large   r than on prior exam, suspicious for metastasis. Dedicated abdominal imaging is recommended.     Examination was marked in EPIC for immediate notification.              Workstation performed: [id number]        Final Interpretation by Celio Martin MD (12/30 2219)      No new pulmonary embolism. Resolution of previously seen right-sided pulmonary embolism. Near complete resolution of previously seen left lower lobe pulmonary embolism with questionable minimal residual linear thrombus in this region.      Spiculated right upper lobe nodule measures slightly larger than on prior exam. Increase in right lung groundglass opacities which may represent worsening pneumonia and/or malignancy.      Incompletely evaluated right upper pole lesion appears larger than on prior exam, suspicious for metastasis. Dedicated abdominal imaging is recommended.      Examination was marked in EPIC for immediate notification.               Workstation performed: [id number]         XR chest pa and lateral   ED Interpretation by Medhat Patten PA-C (12/30 1953)   Right upper lobe consolidation noted on  chest x-ray initial read by me.          ECG 12 Lead Documentation Only    Date/Time: 12/30/2024 9:08 PM    Performed by: Medhat Patten PA-C  Authorized by: Medhat Patten PA-C    Indications / Diagnosis:  Shortness of breath  ECG reviewed by me, the ED Provider: yes    Patient location:  ED  Previous ECG:     Previous ECG:  Compared to current    Comparison ECG info:  Given with ECG of November 24, 2024, no significant changes were noted    Similarity:  No change    Comparison to cardiac monitor: Yes    Interpretation:     Interpretation: normal    Rate:     ECG rate:  73    ECG rate assessment: normal    Rhythm:     Rhythm: sinus rhythm    Ectopy:     Ectopy: none    QRS:     QRS axis:  Normal    QRS intervals:  Normal  Conduction:     Conduction: normal    ST segments:     ST segments:  Normal  T waves:     T waves: normal        ED Medication and Procedure Management   Prior to Admission Medications   Prescriptions Last Dose Informant Patient Reported? Taking?   Probiotic Product (PROBIOTIC DAILY PO) 12/30/2024 Morning Self Yes Yes   Sig: Take 1 capsule by mouth daily ON HOLD   acetaminophen (TYLENOL) 325 mg tablet 12/30/2024  No Yes   Sig: Take 2 tablets (650 mg total) by mouth every 6 (six) hours as needed for mild pain or moderate pain Taking as needed.   amLODIPine (NORVASC) 5 mg tablet 12/30/2024 Morning  No Yes   Sig: Take 1 tablet (5 mg total) by mouth daily   folic acid (FOLVITE) 1 mg tablet 12/30/2024 Morning  No Yes   Sig: Take 1 tablet (1 mg total) by mouth daily   gabapentin (NEURONTIN) 100 mg capsule   No No   Sig: Take 1 capsule PO in the AM  and 3 capsules PO HS.   Patient taking differently: Take 1 capsule PO in the morning, 1 in the afternoonm and 3 capsules PO HS.   levETIRAcetam (KEPPRA) 1000 MG tablet 12/30/2024 Evening  No Yes   Sig: Take 1 tablet (1,000 mg total) by mouth every 12 (twelve) hours   levothyroxine 50 mcg tablet 12/30/2024 Morning  No Yes   Sig: Take 1 tablet (50 mcg total) by  mouth daily in the early morning   pantoprazole (PROTONIX) 40 mg tablet 12/30/2024 Morning  No Yes   Sig: Take 1 tablet (40 mg total) by mouth daily in the early morning   predniSONE 10 mg tablet 12/30/2024 Morning  No Yes   Sig: Take 1 tablet (10 mg total) by mouth daily   rivaroxaban (Xarelto) 20 mg tablet 12/30/2024 Morning  No Yes   Sig: Take 1 tablet (20 mg total) by mouth daily with breakfast   senna (SENOKOT) 8.6 MG tablet Past Week Self Yes Yes   Sig: Take 1 tablet by mouth daily Takes one every other day   sulfamethoxazole-trimethoprim (BACTRIM DS) 800-160 mg per tablet 12/30/2024 Morning  No Yes   Sig: Take 1 tablet by mouth daily Starting 12/24, you can take one tablet Monday, Wednesday, and Fridays. Do not start before December 24, 2024.   vitamin B-12 (VITAMIN B-12) 1,000 mcg tablet 12/30/2024 Morning Self No Yes   Sig: Take 1 tablet (1,000 mcg total) by mouth daily      Facility-Administered Medications: None     Patient's Medications   Discharge Prescriptions    No medications on file     No discharge procedures on file.  ED SEPSIS DOCUMENTATION   Time reflects when diagnosis was documented in both MDM as applicable and the Disposition within this note       Time User Action Codes Description Comment    12/30/2024 11:31 PM Medhat Patten [J18.9,  Y95] HAP (hospital-acquired pneumonia)     12/30/2024 11:33 PM Medhat Patten [Z86.19] History of Pneumocystis jirovecii pneumonia     12/30/2024 11:34 PM Medhat Patten [C34.91] NSCLC of right lung (HCC)     12/30/2024 11:34 PM Medhat Patetn [R06.00] Dyspnea                  Medhat Patten PA-C  12/31/24 [id number]

## 2024-12-31 NOTE — DISCHARGE INSTR - AVS FIRST PAGE
Dear Ayla Nye,     It was our pleasure to care for you here at [facility].  It is our hope that we were always able to exceed the expected standards for your care during your stay.  You were hospitalized due to imbalance and shortness of breath.  You were cared for on the first floor by Karthikeyan Mock MD under the service of Lili Gomez with the [facility] who covers for your primary care physician (PCP), Rafy Angelo MD, while you were hospitalized.  If you have any questions or concerns related to this hospitalization, you may contact us at .  For follow up as well as any medication refills, we recommend that you follow up with your primary care physician.  A registered nurse will reach out to you by phone within a few days after your discharge to answer any additional questions that you may have after going home.  However, at this time we provide for you here, the most important instructions / recommendations at discharge:       Notable Medication Adjustments -   Please take dexamethasone (decadron) 4 mg q8 hours for 5 more days (till end of day 1/6/25)  THEN: Dexamethasone 4 mg twice daily for a week (Ending 1/13/25)  FOLLOWED BY: One 4 mg tablet in the morning and half a tablet for 2 mg in the evening for a week (total of 6 mg a day) till end of day on 1/20/25  Followed by 2 mg twice daily till end of day 1/27/25  Followed by 2 mg once daily until you can see your oncologist for further instructions  Please take all other medications as prescribed  Testing Required after Discharge -   None  Important follow up information -   Please follow-up with your PCP within a week of discharge  Please follow up with your radiation oncologist, Dr. Gamboa, as soon as possible  Please follow-up with your oncologist Dr. Castro as soon as possible  Other Instructions -   Please take all your medications regularly as directed  If  symptoms return/persist contact your PCP if unable then visit to nearest ER    Please review this entire after visit summary as additional general instructions including medication list, appointments, activity, diet, any pertinent wound care, and other additional recommendations from your care team that may be provided for you.      Sincerely,   Jelena Gamble MD

## 2024-12-31 NOTE — ASSESSMENT & PLAN NOTE
Dx'd during Nov/Dec admission, in the setting of malignancy, on Xarelto  Continue Xarelto 20 mg QD

## 2024-12-31 NOTE — ASSESSMENT & PLAN NOTE
Completed cycle 2 chemo on 11/14. Was scheduled for cycle 3 on 12/5, however chemo has been on hold d/t acute illness and concern for toxicity  Consult Onc for suspected progression of malignancy

## 2024-12-31 NOTE — H&P
H&P - Hospitalist   Name: Ayla Nye 74 y.o. female I MRN: [id number]  Unit/Bed#: ED-39 I Date of Admission: 12/30/2024   Date of Service: 12/31/2024 I Hospital Day: 0     Assessment & Plan  Dyspnea on exertion  Unclear at this time whether CAIN 2/2 to progression of known malignancy or new/worsening pneumonia.   At this time, would favor progression of malignancy, as pt cough non-productive, is afebrile, nl HR, nl RR, no leukocytosis, procal negative, imaging findings non-specific.  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 Was scheduled for cycle 3 on 12/5, however chemo has been on hold d/t acute illness and concern for toxicity  Consult Onc for suspected progression of malignancy   Imbalance  Increase prednisone to prior tolerated dose of 20 mg QD  PT/OT eval and treat  Recent PCP (pneumocystis jiroveci pneumonia)  Dx'd during Nov/Dec admission, completed course of atovaquone on 12/23/24  Started Bactrim DS 1 tablet M/W/F for PJP ppx  Continue PJP ppx  Consider ID cx if c/f PNA  History of pulmonary embolism  Dx'd during Nov/Dec admission, in the setting of malignancy, on Xarelto  Continue Xarelto 20 mg QD  Anemia  Chronic since 2023, stable/slightly above baseline  Hypertension  Continue home amlodipine  Hypothyroidism  Continue home levothyroxine      VTE Pharmacologic Prophylaxis: VTE Score: 7 High Risk (Score >/= 5) - Pharmacological DVT Prophylaxis Ordered: rivaroxaban (Xarelto). Sequential  Compression Devices Ordered.  Code Status: Level 3 - DNAR and DNI   Discussion with family:  Pt designates her daughter Juliana as the appropriate contact, but would prefer that she not be called at this hour.     Anticipated Length of Stay: Patient will be admitted on an observation basis with an anticipated length of stay of less than 2 midnights secondary to dyspnea on exertion.    History of Present Illness   Chief Complaint: shortness of breath    Ayla Nye is a 74 y.o. female with a PMH of metastatic lung cancer (originally diagnosed in Feb 2024 s/p chemoradiation completed initial treatment course in August), mets to brain (s/p SRT and has been on prednisone since), persistence of lung cancer currently on chemotherapy (carboplatin, pemetrexed, pembrolizumab), essential thrombocytosis (previously treated with hydroxyurea), psoriatic arthritis, latent TB, hypothyroidism, and anemia.   She was recently admitted from 11/24-12/06/24 for acute PE (started Xarelto) and pneumocystis jirovecii  pneumonia (completed course of Atovaquone 12/23/24, now on Bactrim DS M/W/F for ppx).   She presents to the ED 12/30/24 from heme-onc clinic for further evaluation of dyspnea on exertion. Pt reports that cough and shortness of breath had improved subsequent to recent discharge until acute increase in dyspnea on exertion starting approximately 2 days prior to presentation. Patient lives in a two-story home and reports that, at baseline, she can climb a flight of stairs without stopping, however can currently only climb 3-4 stairs before having to catch her breath. Increase in cough is described as less severe relative to increased CAIN. Cough is non-productive. Does endorse chronic orthopnea. Denies paroxysmal nocturnal dyspnea. Denies fevers, chills, sweats, dizziness, lightheadedness, CP, palpitations, abd pain, N/V/D, constipation, urinary frequency/urgency, dysuria, pain or swelling in the legs or feet.  Pt also c/o  imbalance since tapering prednisone from 20 mg to 10 mg approximately 5 days ago. Has been on prednisone since SRT, has not been able to taper off due to recurrence of neuro sxs.   On ROS, does endorse decrease UOP. Reports decreased PO intake due to early satiety, though states appetite remains intact.       Review of Systems   Constitutional:  Positive for activity change (decreased 2/2 CAIN), appetite change (early satiety) and unexpected weight change (5-pound wt gain on prednisone). Negative for chills, diaphoresis, fatigue and fever.   HENT:  Negative for congestion, rhinorrhea, sore throat and trouble swallowing.    Eyes:  Positive for visual disturbance (reports chronic subjective impairment of peripheral vision).   Respiratory:  Positive for cough (non-productive) and shortness of breath (CAIN).    Cardiovascular:  Negative for chest pain, palpitations and leg swelling.   Gastrointestinal:  Negative for abdominal pain, constipation, diarrhea, nausea and vomiting.   Genitourinary:  Positive for decreased urine volume. Negative for difficulty urinating, dysuria, frequency and urgency.   Neurological:  Positive for headaches. Negative for dizziness, weakness and light-headedness.        C/o imbalance but denies dizziness or lightheadedness       Historical Information   Past Medical History:   Diagnosis Date    Allergic 2022    Allergic to neomiacin and cephalexin    Cancer (HCC)     lung cancer    Cancer (HCC)     mets to brain    Cancer (HCC)     perianal mass    Cataract Nov. 2023    Due to have durgery June 2024    Essential thrombocytosis (HCC)     controlled with medication    Hyperlipidemia     Hypertension     Mass in chest     Nodular goiter     Osteoporosis     Pneumonia Oct 16, 2023    Also  Feb 2024    Psoriasis     SIRS (systemic inflammatory response syndrome) (HCC) 06/22/2024     Past Surgical History:   Procedure Laterality Date    APPENDECTOMY      BREAST CYST EXCISION Right 2009    COLONOSCOPY   10/31/2018    DXA PROCEDURE (HISTORICAL)  2017    IR BIOPSY OTHER  2024    IR LUMBAR PUNCTURE  2024    MAMMO (HISTORICAL)      8-24-18    MAMMO NEEDLE LOCALIZATION RIGHT (ALL INC) Right 2009    SKIN LESION EXCISION      bridge of nose     Social History     Tobacco Use    Smoking status: Former     Current packs/day: 1.00     Average packs/day: 1 pack/day for 59.0 years (59.0 ttl pk-yrs)     Types: Cigarettes     Start date:      Passive exposure: Past    Smokeless tobacco: Never    Tobacco comments:     Quit    Vaping Use    Vaping status: Never Used   Substance and Sexual Activity    Alcohol use: Yes     Alcohol/week: 5.0 standard drinks of alcohol     Types: 5 Glasses of wine per week    Drug use: Never    Sexual activity: Not Currently     Partners: Male     Birth control/protection: Post-menopausal     E-Cigarette/Vaping    E-Cigarette Use Never User      E-Cigarette/Vaping Substances    Nicotine No     THC No     CBD No     Flavoring No     Other No     Unknown No      Family History   Problem Relation Age of Onset    Stroke Mother     Depression Mother             Hypertension Mother     Hearing loss Mother     Tuberculosis Father     Cancer Brother         lung    Tuberculosis Brother     Coronary artery disease Brother     Hypertension Brother     No Known Problems Daughter     No Known Problems Daughter     No Known Problems Maternal Grandmother     No Known Problems Maternal Grandfather     No Known Problems Paternal Grandmother     No Known Problems Paternal Grandfather     No Known Problems Maternal Aunt     No Known Problems Maternal Aunt     No Known Problems Maternal Aunt     No Known Problems Maternal Aunt     No Known Problems Paternal Aunt     Cancer Brother         Throat canver     Social History:  Marital Status:    Occupation: Not assessed  Patient Pre-hospital Living Situation: Home  Patient Pre-hospital Level of Mobility: walks  Patient  Pre-hospital Diet Restrictions: None    Meds/Allergies   I have reviewed home medications with patient personally.  Prior to Admission medications    Medication Sig Start Date End Date Taking? Authorizing Provider   acetaminophen (TYLENOL) 325 mg tablet Take 2 tablets (650 mg total) by mouth every 6 (six) hours as needed for mild pain or moderate pain Taking as needed. 12/11/24 1/10/25 Yes ZULEYMA Pollard   amLODIPine (NORVASC) 5 mg tablet Take 1 tablet (5 mg total) by mouth daily 12/12/24 1/11/25 Yes Rafy Angelo MD   folic acid (FOLVITE) 1 mg tablet Take 1 tablet (1 mg total) by mouth daily 11/22/24  Yes Rafy Angelo MD   levETIRAcetam (KEPPRA) 1000 MG tablet Take 1 tablet (1,000 mg total) by mouth every 12 (twelve) hours 11/22/24 12/31/24 Yes Rafy Angelo MD   levothyroxine 50 mcg tablet Take 1 tablet (50 mcg total) by mouth daily in the early morning 12/12/24 1/11/25 Yes Rafy Angelo MD   pantoprazole (PROTONIX) 40 mg tablet Take 1 tablet (40 mg total) by mouth daily in the early morning 12/12/24 1/11/25 Yes Rafy Angelo MD   predniSONE 10 mg tablet Take 1 tablet (10 mg total) by mouth daily 12/10/24  Yes Rosalinda Castro MD   Probiotic Product (PROBIOTIC DAILY PO) Take 1 capsule by mouth daily ON HOLD   Yes Historical Provider, MD   rivaroxaban (Xarelto) 20 mg tablet Take 1 tablet (20 mg total) by mouth daily with breakfast 11/27/24  Yes Fabby Monzon DO   senna (SENOKOT) 8.6 MG tablet Take 1 tablet by mouth daily Takes one every other day   Yes Historical Provider, MD   sulfamethoxazole-trimethoprim (BACTRIM DS) 800-160 mg per tablet Take 1 tablet by mouth daily Starting 12/24, you can take one tablet Monday, Wednesday, and Fridays. Do not start before December 24, 2024. 12/24/24 6/22/25 Yes Rosalinda Castro MD   vitamin B-12 (VITAMIN B-12) 1,000 mcg tablet Take 1 tablet (1,000 mcg total) by mouth daily 9/14/23  Yes ZULEYMA Boland   gabapentin (NEURONTIN) 100 mg capsule Take 1  capsule PO in the AM  and 3 capsules PO HS.  Patient taking differently: Take 1 capsule PO in the morning, 1 in the afternoonm and 3 capsules PO HS. 12/11/24   ZULEYMA Pollard   rivaroxaban (Xarelto) 15 mg tablet Take 1 tablet (15 mg total) by mouth 2 (two) times a day for 21 days 11/27/24 12/31/24  Fabby Monzon, DO     Allergies   Allergen Reactions    Doxycycline Headache    Keflex [Cephalexin] Rash    Neomycin-Polymyxin-Dexameth Eye Swelling       Objective :  Temp:  [98.6 °F (37 °C)-98.8 °F (37.1 °C)] 98.8 °F (37.1 °C)  HR:  [71-92] 71  BP: (128-151)/(70-82) 136/70  Resp:  [16-18] 16  SpO2:  [93 %-97 %] 93 %  O2 Device: None (Room air)    Physical Exam  Vitals reviewed.   Constitutional:       General: She is not in acute distress.     Appearance: Normal appearance. She is normal weight. She is not ill-appearing, toxic-appearing or diaphoretic.   HENT:      Head: Normocephalic and atraumatic.   Eyes:      General: No scleral icterus.        Right eye: No discharge.         Left eye: No discharge.      Conjunctiva/sclera: Conjunctivae normal.   Cardiovascular:      Rate and Rhythm: Normal rate and regular rhythm.      Pulses: Normal pulses.      Heart sounds: Normal heart sounds. No murmur heard.     No friction rub. No gallop.   Pulmonary:      Effort: Pulmonary effort is normal. No respiratory distress.      Breath sounds: No stridor. Rales (scant inspiratory rales L middle lung field) present. No wheezing or rhonchi.   Chest:      Chest wall: No tenderness.   Abdominal:      General: Bowel sounds are normal. There is no distension.      Palpations: Abdomen is soft.      Tenderness: There is no abdominal tenderness. There is no guarding or rebound.   Musculoskeletal:         General: No swelling, tenderness, deformity or signs of injury.      Right lower leg: No edema.      Left lower leg: No edema.   Skin:     General: Skin is warm and dry.      Coloration: Skin is not jaundiced or pale.    Neurological:      Mental Status: She is alert and oriented to person, place, and time.   Psychiatric:         Mood and Affect: Mood normal.         Behavior: Behavior normal.        Lines/Drains:            Lab Results: I have reviewed the following results:  Results from last 7 days   Lab Units 12/30/24  1442   WBC Thousand/uL 8.11   HEMOGLOBIN g/dL 10.2*   HEMATOCRIT % 32.9*   PLATELETS Thousands/uL 294   SEGS PCT % 89*   LYMPHO PCT % 3*   MONO PCT % 5   EOS PCT % 0     Results from last 7 days   Lab Units 12/30/24  1442   SODIUM mmol/L 140   POTASSIUM mmol/L 4.2   CHLORIDE mmol/L 105   CO2 mmol/L 26   BUN mg/dL 20   CREATININE mg/dL 1.14   ANION GAP mmol/L 9   CALCIUM mg/dL 9.3   ALBUMIN g/dL 4.1   TOTAL BILIRUBIN mg/dL 0.27   ALK PHOS U/L 44   ALT U/L 7   AST U/L 10*   GLUCOSE RANDOM mg/dL 103     Results from last 7 days   Lab Units 12/30/24 2050   INR  1.38*         Lab Results   Component Value Date    HGBA1C 5.3 07/30/2024     Results from last 7 days   Lab Units 12/30/24  1927   PROCALCITONIN ng/ml 0.07       Imaging Results Review: I reviewed radiology reports from this admission including: CT chest.  Other Study Results Review: Other studies reviewed include: Echo from recent admission    Administrative Statements       ** Please Note: This note has been constructed using a voice recognition system. **

## 2024-12-31 NOTE — ASSESSMENT & PLAN NOTE
Dx'd during Nov/Dec admission, completed course of atovaquone on 12/23/24  Started Bactrim DS 1 tablet M/W/F for PJP ppx  Continue PJP ppx  Consider ID cx if c/f PNA

## 2024-12-31 NOTE — ASSESSMENT & PLAN NOTE
Unclear at this time whether CAIN 2/2 to progression of known malignancy or new/worsening pneumonia.   At this time, would favor progression of malignancy, as pt cough non-productive, is afebrile, nl HR, nl RR, no leukocytosis, procal negative, imaging findings non-specific.  Recently completed atovaquone and remains on Bactrim ppx   Low suspicion for cardiac etiology, given unremarkable echo on 11/25/24  Low suspicion this is d/t anemia, given Hgb at or above baseline    Check ambulatory pulse ox to assess for hypoxemia, as pt breathing comfortably on RA at rest w/o hypoxemia  Consult Heme-Onc for suspected progression of malignancy  Monitor off additional abx  Trend WBC and monitor fever curve  F/u Bcx  If acute infection suspected, would consult ID

## 2024-12-31 NOTE — DISCHARGE SUMMARY
Discharge Summary - Hospitalist   Name: Ayla Nye 74 y.o. female I MRN: [id number]  Unit/Bed#: S -01 I Date of Admission: 12/30/2024   Date of Service: 1/2/2025 I Hospital Day: 1     Assessment & Plan  Dyspnea on exertion  Unclear at this time whether CAIN 2/2 to progression of known malignancy or new/worsening pneumonia.   At this time, would favor progression of malignancy, as pt cough non-productive, is afebrile, nl HR, nl RR, no leukocytosis, procal negative, imaging findings non-specific.  Recently completed atovaquone and remains on Bactrim ppx   Low suspicion for cardiac etiology, given unremarkable echo on 11/25/24  Low suspicion this is d/t anemia, given Hgb at or above baseline    Plan:  Patient comfortable on RA on d/c  Lung cancer metastatic to brain (HCC)  Completed cycle 2 chemo on 11/14. Was scheduled for cycle 3 on 12/5, however chemo has been on hold d/t acute illness and concern for toxicity  Patient will follow-up with Dr. Penn's office  Imbalance  Per oncology changed from prednisone to Decadron with a long taper; will follow-up outpatient  Recent PCP (pneumocystis jiroveci pneumonia)  Dx'd during Nov/Dec admission, completed course of atovaquone on 12/23/24  Started Bactrim DS 1 tablet M/W/F for PJP ppx  Continue PJP ppx  Consider ID cx if c/f PNA  History of pulmonary embolism  Dx'd during Nov/Dec admission, in the setting of malignancy, on Xarelto  Continue Xarelto 20 mg QD  Anemia  Chronic since 2023, stable/slightly above baseline  Hypertension  Continue home amlodipine  Hypothyroidism  Continue home levothyroxine  Right kidney mass       Medical Problems       Resolved Problems  Date Reviewed: 1/2/2025   None       Discharging Physician / Practitioner: Jelena Gamble MD  PCP: Rafy Angelo MD  Admission Date:   Admission Orders (From admission, onward)       Ordered        01/01/25 0737  INPATIENT ADMISSION  Once            12/30/24 2335  Place in Observation  Once                           Discharge Date: 01/02/25    Consultations During Hospital Stay:  Heme/Onc    Procedures Performed:   None    Significant Findings / Test Results:   No new pulmonary embolism. Resolution of previously seen right-sided pulmonary embolism. Near complete resolution of previously seen left lower lobe pulmonary embolism with questionable minimal residual linear thrombus in this region.  Spiculated right upper lobe nodule measures slightly larger than on prior exam. Increase in right lung groundglass opacities which may represent worsening pneumonia and/or malignancy.   Incompletely evaluated right upper pole lesion appears larger than on prior exam, suspicious for metastasis   CT abdomen w contrast   Final Result by Natalio Carr MD (01/01 1228)      1.  Enlarging complex mass upper pole right kidney concerning for enlarging metastasis or primary renal neoplasia.   2.  No acute findings. See comments above for full analysis.         Workstation performed: [id number]         CTA chest pe study   ED Interpretation by Medhat Patten PA-C (12/30 2252)   Celio Martin MD  [phone]    12/30/2024 STAT    Narrative & Impression  CTA - CHEST WITH IV CONTRAST - PULMONARY ANGIOGRAM     INDICATION: shortness of breath; currently on prednisone, history of lung cancer, history of 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     COMPARISON: CTA PE study 11/24/2024.     TECHNIQUE: CTA examination of the chest was performed using angiographic technique according to a protocol specifically tailored to evaluate for pulmonary embolism. Multiplanar 2D reformatted images were created from the source data. In addition, coronal   3D MIP postprocessing was performed on the acquisition scanner.     Radiation dose length product (DLP) for this visit: 191 mGy-cm . This examination, like all CT scans performed in the [facility], was performed utilizing techniques to minimize  radiation dose exposure, including the use of iterative   reconstruction and automated exposure control.     IV Contrast: 85 mL of iohexol (OMN   IPAQUE)     FINDINGS:     PULMONARY ARTERIAL TREE: Interval resolution of right middle lobe/right lower lobe pulmonary embolism. Interval near complete resolution of previously seen left lower lobe pulmonary embolism with questionable minimal residual linear thrombus in this   region (301/109).        LUNGS: Spiculated right upper lobe nodule measures 2.5 x 2.4 cm (3/62), slightly larger than on prior exam. Similar appearance of right upper lobe groundglass opacities with interval increase in opacities in the superior right lower lobe. Cluster of   micronodules in the medial right upper lobe (304/) may be infectious or inflammatory. Additional groundglass and reticular opacities in the left upper lobe. New lingular 6 mm nodule (304/173) and left lower lobe 7 mm nodule (304/170). Mild   emphysema.     PLEURA: Left calcified basilar pleural plaque (301/158).     HEART/GREAT VESSELS: Heart is normal in size. Coronary artery calcifications. No thoracic aortic aneurysm.     MEDIASTIN   UM AND SUDEEP: Unremarkable.     CHEST WALL AND LOWER NECK: Unremarkable.     VISUALIZED STRUCTURES IN THE UPPER ABDOMEN: Few calculi within the partially imaged right kidney measuring up to 3 mm without hydronephrosis in the visualized portion of the right kidney. Exophytic lesion arising from the upper pole of the right kidney   measuring approximately 2.6 cm (602/112) in the region in which metastasis was suspected on prior CT.     OSSEOUS STRUCTURES: Degenerative changes of the spine.     IMPRESSION:     No new pulmonary embolism. Resolution of previously seen right-sided pulmonary embolism. Near complete resolution of previously seen left lower lobe pulmonary embolism with questionable minimal residual linear thrombus in this region.     Spiculated right upper lobe nodule measures  slightly larger than on prior exam. Increase in right lung groundglass opacities which may represent worsening pneumonia and/or malignancy.     Incompletely evaluated right upper pole lesion appears large   r than on prior exam, suspicious for metastasis. Dedicated abdominal imaging is recommended.     Examination was marked in EPIC for immediate notification.              Workstation performed: [id number]        Final Result by Celio Martin MD (12/30 2219)      No new pulmonary embolism. Resolution of previously seen right-sided pulmonary embolism. Near complete resolution of previously seen left lower lobe pulmonary embolism with questionable minimal residual linear thrombus in this region.      Spiculated right upper lobe nodule measures slightly larger than on prior exam. Increase in right lung groundglass opacities which may represent worsening pneumonia and/or malignancy.      Incompletely evaluated right upper pole lesion appears larger than on prior exam, suspicious for metastasis. Dedicated abdominal imaging is recommended.      Examination was marked in EPIC for immediate notification.               Workstation performed: [id number]         XR chest pa and lateral   ED Interpretation by Medhat Patten PA-C (12/30 1953)   Right upper lobe consolidation noted on chest x-ray initial read by me.      Final Result by Torsten Andrade MD (12/31 1024)      Right upper lobe mass which is better characterized on concurrent CTA chest. Right upper lung atelectasis has increased since the 12/1/2024 radiograph. Opacities in the right upper lung could represent posttreatment change an/or tumor. Infection is to be    determined clinical grounds.            Resident: ASYA CHANCE I, the attending radiologist, have reviewed the images and agree with the final report above.      Workstation performed: [id number]              Incidental Findings:   All findings related to main diagnosis       Test Results Pending  "at Discharge (will require follow up):   None     Outpatient Tests Requested:  None    Complications: None    Reason for Admission: Dyspnea on exertion.    Hospital Course:   Ayla Nye is a 74 y.o. female patient who originally presented to the hospital on 12/30/2024 due to dyspnea on exertion.    Patient was referred from outpatient clinic to evaluate dyspnea on exertion along with nonproductive cough.  We suspected immunotherapy with Keytruda plus or minus radiation induced pneumonitis.  Hematology oncology was consulted and they decided to initiate prednisone but later switched to Decadron with a long taper.  She will follow-up outpatient for further instructions post taper.  Imaging shows new suspected lung lesions as well as enlargement of complex lesion on her right kidney pole; oncology evaluated and she is okay to follow-up outpatient.  Patient will show was discharged home with the help of her home aide.    Condition at Discharge: good    Discharge Day Visit / Exam:   Subjective: Patient seen resting in bed.  She reports feeling well and much improved from her admission.  She notes that she does not enjoy a taking Decadron but understands that it mitigates a lot of her symptoms.  She knows to follow-up with radiation oncology as she missed an appointment earlier today as well as her oncologist on discharge.  Vitals: Blood Pressure: 122/69 (01/02/25 0741)  Pulse: 70 (01/02/25 0741)  Temperature: 98.2 °F (36.8 °C) (01/02/25 0741)  Temp Source: Oral (01/02/25 0741)  Respirations: 18 (01/02/25 0741)  Height: 5' 2\" (157.5 cm) (12/30/24 1439)  Weight - Scale: 53.1 kg (117 lb) (12/30/24 1439)  SpO2: 95 % (01/02/25 0948)  Physical Exam  Vitals and nursing note reviewed.   Constitutional:       General: She is not in acute distress.     Appearance: She is well-developed.   HENT:      Head: Normocephalic and atraumatic.   Eyes:      General: No scleral icterus.        Right eye: No discharge.         Left " eye: No discharge.      Conjunctiva/sclera: Conjunctivae normal.   Cardiovascular:      Rate and Rhythm: Normal rate and regular rhythm.      Heart sounds: No murmur heard.  Pulmonary:      Effort: Pulmonary effort is normal. No respiratory distress.      Breath sounds: Normal breath sounds.   Abdominal:      General: There is no distension.      Palpations: Abdomen is soft.      Tenderness: There is no abdominal tenderness. There is no right CVA tenderness, left CVA tenderness or guarding.   Musculoskeletal:         General: No swelling.   Skin:     General: Skin is warm and dry.      Coloration: Skin is not jaundiced.   Neurological:      Mental Status: She is alert and oriented to person, place, and time. Mental status is at baseline.   Psychiatric:         Mood and Affect: Mood normal.         Thought Content: Thought content normal.         Judgment: Judgment normal.          Discussion with Family: Patient declined call to .     Discharge instructions/Information to patient and family:   See after visit summary for information provided to patient and family.      Provisions for Follow-Up Care:  See after visit summary for information related to follow-up care and any pertinent home health orders.      Mobility at time of Discharge:   Basic Mobility Inpatient Raw Score: 24  JH-HLM Goal: 8: Walk 250 feet or more  JH-HLM Achieved: 8: Walk 250 feet ot more  HLM Goal achieved. Continue to encourage appropriate mobility.     Disposition:   Home    Planned Readmission: No    Discharge Medications:  See after visit summary for reconciled discharge medications provided to patient and/or family.      Administrative Statements   **Please Note: This note may have been constructed using a voice recognition system**

## 2024-12-31 NOTE — ASSESSMENT & PLAN NOTE
Completed cycle 2 chemo on 11/14. Was scheduled for cycle 3 on 12/5, however chemo has been on hold d/t acute illness and concern for toxicity  Patient will follow-up with Dr. Penn's office

## 2024-12-31 NOTE — ASSESSMENT & PLAN NOTE
Unclear at this time whether CAIN 2/2 to progression of known malignancy or new/worsening pneumonia.   At this time, would favor progression of malignancy, as pt cough non-productive, is afebrile, nl HR, nl RR, no leukocytosis, procal negative, imaging findings non-specific.  Recently completed atovaquone and remains on Bactrim ppx   Low suspicion for cardiac etiology, given unremarkable echo on 11/25/24  Low suspicion this is d/t anemia, given Hgb at or above baseline    Plan:  Patient comfortable on RA on d/c

## 2024-12-31 NOTE — CONSULTS
"Medical Oncology/Hematology Consult Note  Ayla Nye, female, 74 y.o., 1950,  ED-39/ED-39, [id number]     Assessment and Plan  1. Non Small Cell Lung Cancer, most consistent with adenocarcinoma,  with mets to brain   2. Perianal Mass, Squamous Cell Carcinoma questionable primary vs mets of not biopsied component of the lung carcinoma s/p RT   On treatment with Carboplatin AUC 5 Pemetrexed 500 mg/m2 and Pembrolizumab 200 mg IV every 3 weeks x 4 cycles. Cycle 2 was completed on 11/14/24 12/30/24 CTA Chest: No new PE. Near complete resolution of old LLL PE. RUL nodule slightly larger. Increase in right lung GGO. Incompletely evaluated right upper pole lesion appears larger.     Too early to call therapy failure as patient only received two cycles of systemic therapy.   To continue follow up with medical oncology, Dr. Castro after this admission , no indication for urgent/inpatient anticancer therapy.     3. Brain mets s/p SRS but now with recurrent/enlarged brain mets & new imbalance (shift to right) when walking x 1 week    12/27/24 Brain MRI:   1. New enhancing lesion in the left frontal lobe, which is suspicious for metastasis.  2. Increased size of left occipital and left paramedian parietal lobe lesions without appreciable elevated perfusion. Findings favor posttreatment sequelae/radiation effects though recommend continued attention on follow-up. Of note, there is slightly increased associated vasogenic edema in the left cerebral hemisphere, as described above.  3. The other lesions are stable to decreased/less conspicuous compared to the prior study.  4. 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    Patient reports having imbalance when walking \"shift to the right\" for about 1 weeks now since tapered down prednisone to 10 mg daily.      Need close follow up with Radiation " Oncology. Has appt with Dr. Gamboa on 01/02/25. Case is discussed with Dr. Gamboa today: no urgent intervention. She will need to reschedule with Rad Oncology after this admission.     Switching prednisone to dexamethasone, 4 mg Q8 hours for a week, then 4 mg twice daily for a week then 4 + 2 (AM + PM) for one week then 2+2 for a week followed by 2 mg QD. Outpatient follow up with med onc and rad onc for further eval and adjustment as appropriate.     Recommend PT / OT eval & fall precautions.       4. Dyspnea on exertion   5. Non productive cough ; resp symptoms started for about 1 week now since down on 10 mg prednisone   6. 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     Suspected pneumonitis; risk factors : 60 GY radiation therapy to the lung (5/23 - 7/5/24) & on Pembrolizumab (given 10/7/24 & 11/14/24). CTA Chest 12/30 showing increased ground glass opacification in right lung and only slight enlargement fof the RUL Nodule.     Other differential is lymphangitic spread although no CT findings. May consider bronchoscopy to rule out, especially resp symptoms not resolved with steroid. Less likely pneumonia given WBC and Procalc normal, no fever, and no CT findings to suggest PNA.     Recommendations:   Will hold off further immunotherapy (Pembrolizumab) till further eval.   For grade II immunotherapy induced pneumonitis, recs for prednisone 1 mg / kg daily (or equivalent) with slow taper , or equivalent. Given brain mets with vasogenic edema, prefers dexamethazone as above.   Not neutropenic, ANC 7290 , no need for G-CSF   Rest of care including infection workup as per primary team.   Continue PPI especially while on steroid.     Appreciate the consultation. Medical oncology will continue to follow and revaluate while inpatient, and the patient to follow up with Dr. Castro after this hospitalization  for further eval and management planning. Please feel free to contact with any questions or concerns.         I did review the case was my attending Dr. Castro. We saw the patient together and discussed above assessment and plan.   Communication with patient/family:  I did update the patient    Communication with team:  Did communicate with Dr. Mccoy, primary team. I also spoke with Dr. Gamboa, Radiation Oncology.   I did review this patient with Dr. Castro and she is in agreement with this plan.            Reason for consultation: lung cancer on treatment now presenting with CAIN and cough      History of present illness:  Ayla Nye is a 74 y.o. female with medical hx remarkable of HTN, HLD, Nodular Goiter, Osteoprosis, Psoriasis, essential thrombocytosis, Pulmonary Embolism, lung adenocarcinoma as outlined below, and had superficial perianal mass biopsy showed squamous cell carcinoma s/p R gluteus radiation therapy completed 10/18/2024 .     history of [id number] (IIIB) lung adenocarcinoma of the right upper lobe, s/p concurrent chemoRT completing on 7/5/24. She completed a course of SRS on 8/8/24 after MRI brain demonstrated five supra- and infratentorial enhancing lesions compatible with metastases. She also completed a course of palliative radiation to right gluteus (for superficial squamous cell lesion) on 10/18/24.     On treatment with Carboplatin AUC 5 Pemetrexed 500 mg/m2 and Pembrolizumab 200 mg IV every 3 weeks x 4 cycles. Cycle 2 was completed on 11/14/24 11/25/24 ECHO with EF 65% and grade I Diastolic Dysfunction.     12/30/24 presented to the ED with CAIN, non-productive cough, afebrile, Procalcitonin 0.06 & WBC 5.13 not elevated.CTA Chest: No new PE. Near complete resolution of old LLL PE. RUL nodule slightly larger. Increase in right lung GGO. Incompletely evaluated right upper pole lesion appears larger.     Patient was seen and examined with my attending in the ED. Patient reported that  "since she tapered the prednisone down to 10 mg QD , about a week ago, she started to have the dry cough and worsening SOB. She also noted that around the same time, she started to have imbalance \"shifting to the Right side when walking.\" No visual changes or other relevant symptoms. Above A&P is explained to the patient in details and multidisciplinary discussion with Primary team Dr. Mccoy and Rad Onc team Dr. Gamboa is preformed with updated on above findings, A&P.       Review of Systems:    Review of Systems   Constitutional:  Positive for activity change. Negative for chills, fatigue, fever and unexpected weight change.   HENT:  Negative for hearing loss, mouth sores, nosebleeds, postnasal drip, rhinorrhea, sneezing, sore throat and trouble swallowing.    Eyes:  Negative for photophobia and visual disturbance.   Respiratory:  Positive for cough and shortness of breath. Negative for wheezing.    Cardiovascular:  Negative for chest pain, palpitations and leg swelling.   Gastrointestinal:  Negative for abdominal distention, abdominal pain, blood in stool, nausea and vomiting.   Genitourinary:  Negative for difficulty urinating and hematuria.   Musculoskeletal:  Negative for back pain and neck pain.   Skin:  Negative for rash and wound.   Neurological:  Negative for seizures and speech difficulty.        Imbalance when standing / walking \"shift to the right\" x 1 weeks since prednisone down to 10 mg daily    Hematological:  Negative for adenopathy.   Psychiatric/Behavioral:  Negative for agitation and behavioral problems.        Oncology History:   Cancer Staging   Malignant neoplasm of upper lobe, right bronchus or lung (HCC)  Staging form: Lung, AJCC 8th Edition  - Clinical stage from 4/15/2024: Stage IIIB (cT2b, cN3, cM0) - Signed by Rogelio Beebe DO on 4/15/2024  - Clinical: Stage IV (cM1) - Signed by Honey Gamboa MD on 9/23/2024    Oncology History Overview Note   history of [id number] (IIIB) NSCLC " of the right upper lobe, s/p concurrent chemoRT completing on 7/5/24. She completed a course of SRS on 8/8/24 after MRI brain demonstrated five supra- and infratentorial enhancing lesions compatible with metastases. She also completed a course of palliative radiation to right gluteus on 10/18/24. She tolerated RT without much toxicity. She continued  to have right buttock pain. Has been prescribed increase in dex to 2mg BID, but not helpful. She was previously prescribed dilaudid but did not fill this medication. Discussed with palliative care who will call the patient to review non-opioid based alternatives including gabapentin. Pain control per palliative medicine.     She was admitted with seizure and neurologic symptoms prior to completion of her last fx of radiation therapy.  Patient received 14/15 fx to right gluteus. Due to prolonged hospitalization (10/16/24-current), patient had a break in treatment. Call placed to patient regarding last treatment, patient stated she was ok with ending after 14 treatments. She returns today for first routine follow-up.       11/5/24 Dr. Castro  On treatment with Carboplatin AUC 5 Pemetrexed 500 mg/m2 and Pembrolizumab 200 mg IV every 3 weeks x 4 cycles   C1 held due to hospital admission  C1 held due to radiation and concurrent high-dose steroid use   C1 now 10/7/2024  C2 10/28/2024-held due to admission   C2 now 11/14/2024 11/13/24 Palliative care  Current regimen: Gabapentin 100mg AM, 100mg  Noon, 300mg Bedtime  Tylenol 650mg TID  Heating pad during the day  Pain is much better controlled.   Follow-up in 4 weeks.      11/24 - 12/6/24 Hospital admission  Presented with fatigue SOB, and home temp of 101.3.   Imaging showed b/l PE and RUL pneumonia      12/10/24 Dr. Castro  Admission 11/24-12/6/2024 PCP pneumonia, PE now on Xarelto    Atovaquone was started for 21 days, to complete 12/23/2024   Will then start Bactrim DS M/W/F   Again remains on prednisone, on  current taper -60 mg bid x 5 days, then 40 mg x 5 days then 20 mg x 5 days then 10 mg daily-will be at 10 mg daily by 21 Dec 2024   Doing better, less SOB   Will hold any treatment for now-cannot get IO therapy and unclear that she is a good candidate based on chronic immunosuppression from steroids   Will consider Carboplatin/Alimta later this month        12/27/24 MRI brain BT  IMPRESSION:  1. New enhancing lesion in the left frontal lobe, which is suspicious for metastasis.  2. Increased size of left occipital and left paramedian parietal lobe lesions without appreciable elevated perfusion. Findings favor posttreatment sequelae/radiation effects though recommend continued attention on follow-up. Of note, there is slightly   increased associated vasogenic edema in the left cerebral hemisphere, as described above.  3. The other lesions are stable to decreased/less conspicuous compared to the prior study.  4. 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    12/30/24 CTA chest pe study  IMPRESSION:  No new pulmonary embolism. Resolution of previously seen right-sided pulmonary embolism. Near complete resolution of previously seen left lower lobe pulmonary embolism with questionable minimal residual linear thrombus in this region.  Spiculated right upper lobe nodule measures slightly larger than on prior exam. Increase in right lung groundglass opacities which may represent worsening pneumonia and/or malignancy.  Incompletely evaluated right upper pole lesion appears larger than on prior exam, suspicious for metastasis. Dedicated abdominal imaging is recommended.    12/30/24 Dr. Castro  No treatment since 11/14/2024 due to multiple admissions/toxicities, PCP pneumonia, PE on Xarelto   reated for PCP pneumonia with Atovaquone completed 12/23/2024 now on Bactrim DS MW F  Able to walk only few steps and  has to rest  Prednisone tapered to 10 mg daily and now again more issues with balance  Would rule out adrenal insuffciency due to long term steroids/tapering/effects  May need to increase back to 20 mg daily prednisone vs hydrocortisone  Assess for dehydration as well  Will be sent to ER for further evaluation     12/30/24 Presented to ED at the recommendation of Dr. Castro.     Upcoming appts:  1/7/25 CT chest wo contrast  1/8/25 Palliative care  1/15/25 Infusion   1/29/25 Pulmonary       Malignant neoplasm of upper lobe, right bronchus or lung (HCC)   2024 Initial Diagnosis    Primary lung adenocarcinoma, right (HCC)     3/26/2024 Biopsy    A-C. Lymph Node, Level 4R :    - Metastatic non-small cell carcinoma, most compatible with a primary lung adenocarcinoma; see note.       D-F. Lymph Node, Level 10R:    - Metastatic non-small cell carcinoma; see note.       G-I. Lymph Node, Level 4L:    - Metastatic non-small cell carcinoma; see note.       4/15/2024 -  Cancer Staged    Staging form: Lung, AJCC 8th Edition  - Clinical stage from 4/15/2024: Stage IIIB (cT2b, cN3, cM0) - Signed by Rogelio Beebe DO on 4/15/2024       5/23/2024 - 7/2/2024 Chemotherapy    alteplase (CATHFLO), 2 mg, Intracatheter, Every 1 Minute as needed, 6 of 6 cycles  CARBOplatin (PARAPLATIN) IVPB (GOG AUC DOSING), 130.4 mg, Intravenous, Once, 6 of 6 cycles  Administration: 130.4 mg (5/23/2024), 144.8 mg (5/30/2024), 138.4 mg (6/6/2024), 144.8 mg (6/13/2024), 132 mg (6/21/2024), 143.6 mg (7/2/2024)  PACLItaxel (TAXOL) chemo IVPB, 45 mg/m2 = 67.2 mg (90 % of original dose 50 mg/m2), Intravenous, Once, 6 of 6 cycles  Dose modification: 45 mg/m2 (original dose 50 mg/m2, Cycle 1, Reason: Anticipated Tolerance)  Administration: 67.2 mg (5/23/2024), 67.2 mg (5/30/2024), 67.2 mg (6/6/2024), 67.2 mg (6/13/2024), 67.2 mg (6/21/2024), 67.2 mg (7/2/2024)     5/23/2024 - 7/5/2024 Radiation    Treatment:  Course: C1    Plan ID Energy Fractions Dose  per Fraction (cGy) Dose Correction (cGy) Total Dose Delivered (cGy) Elapsed Days   R Lung_Hilum 6X 30 / 30 200 0 6,000 43      Treatment dates:  C1: 5/23/2024 - 7/5/2024 8/8/2024 - 8/8/2024 Radiation    SRS 5 PTVs   2000 cGy     9/12/2024 Biopsy    Mass, Superficial perianal mass:  - Squamous cell carcinoma.     Note: The patient's prior lung EBUS sampling shows the tumor to stain for TTF1 and Napsin with absent p40 expression.  The current perianal mass sampling shows diffuse p40 expression with absent TTF1 expression.  This may represent a primary anal/perianal squamous cell carcinoma versus a possible metastatic combined lung tumor (adenocarcinoma and previously unsampled squamous component).  Suggest clinical correlation and appropriate follow-up.         9/23/2024 -  Cancer Staged    Staging form: Lung, AJCC 8th Edition  - Clinical: Stage IV (cM1) - Signed by Honey Gamboa MD on 9/23/2024       10/1/2024 - 10/18/2024 Radiation    Course: C3    Plan ID Energy Fractions Dose per Fraction (cGy) Dose Correction (cGy) Total Dose Delivered (cGy) Elapsed Days   R Gluteus:1 10X/6X 14 / 15 300 0 4,200 17      Treatment Dates:  10/1/2024 - 10/18/2024.       10/7/2024 -  Chemotherapy    cyanocobalamin, 1,000 mcg, Intramuscular, Once, 1 of 3 cycles  Administration: 1,000 mcg (8/19/2024)  alteplase (CATHFLO), 2 mg, Intracatheter, Every 1 Minute as needed, 2 of 6 cycles  fosaprepitant (EMEND) IVPB, 150 mg, Intravenous, Once, 2 of 6 cycles  Administration: 150 mg (10/7/2024), 150 mg (11/14/2024)  CARBOplatin (PARAPLATIN) IVPB (GOG AUC DOSING), 347 mg, Intravenous, Once, 2 of 6 cycles  Administration: 347 mg (10/7/2024), 346 mg (11/14/2024)  pemetrexed (ALIMTA) chemo infusion, 735 mg, Intravenous, Once, 2 of 6 cycles  Administration: 700 mg (10/7/2024), 700 mg (11/14/2024)  pembrolizumab (KEYTRUDA) IVPB, 200 mg, Intravenous, Once, 2 of 6 cycles  Administration: 200 mg (11/14/2024), 200 mg (10/7/2024)     Metastatic  cancer to brain (HCC)   7/29/2024 Initial Diagnosis    Metastatic cancer to brain (HCC)     8/8/2024 - 8/8/2024 Radiation    SRS 5 PTVs   2000 cGy     9/12/2024 Biopsy    Mass, Superficial perianal mass:  - Squamous cell carcinoma.     Note: The patient's prior lung EBUS sampling shows the tumor to stain for TTF1 and Napsin with absent p40 expression.  The current perianal mass sampling shows diffuse p40 expression with absent TTF1 expression.  This may represent a primary anal/perianal squamous cell carcinoma versus a possible metastatic combined lung tumor (adenocarcinoma and previously unsampled squamous component).  Suggest clinical correlation and appropriate follow-up.         10/1/2024 - 10/18/2024 Radiation    Course: C3    Plan ID Energy Fractions Dose per Fraction (cGy) Dose Correction (cGy) Total Dose Delivered (cGy) Elapsed Days   R Gluteus:1 10X/6X 14 / 15 300 0 4,200 17      Treatment Dates:  10/1/2024 - 10/18/2024.           Past Medical History:   Past Medical History:   Diagnosis Date    Allergic 2022    Allergic to neomiacin and cephalexin    Cancer (HCC)     lung cancer    Cancer (HCC)     mets to brain    Cancer (HCC)     perianal mass    Cataract Nov. 2023    Due to have durgery June 2024    Essential thrombocytosis (HCC)     controlled with medication    Hyperlipidemia     Hypertension     Mass in chest     Nodular goiter     Osteoporosis     Pneumonia Oct 16, 2023    Also  Feb 2024    Psoriasis     SIRS (systemic inflammatory response syndrome) (HCC) 06/22/2024       Past Surgical History:   Procedure Laterality Date    APPENDECTOMY      BREAST CYST EXCISION Right 2009    COLONOSCOPY  10/31/2018    DXA PROCEDURE (HISTORICAL)  04/21/2017    IR BIOPSY OTHER  9/12/2024    IR LUMBAR PUNCTURE  9/12/2024    MAMMO (HISTORICAL)      8-24-18    MAMMO NEEDLE LOCALIZATION RIGHT (ALL INC) Right 07/13/2009    SKIN LESION EXCISION      bridge of nose       Family History   Problem Relation Age of Onset     Stroke Mother     Depression Mother             Hypertension Mother     Hearing loss Mother     Tuberculosis Father     Cancer Brother         lung    Tuberculosis Brother     Coronary artery disease Brother     Hypertension Brother     No Known Problems Daughter     No Known Problems Daughter     No Known Problems Maternal Grandmother     No Known Problems Maternal Grandfather     No Known Problems Paternal Grandmother     No Known Problems Paternal Grandfather     No Known Problems Maternal Aunt     No Known Problems Maternal Aunt     No Known Problems Maternal Aunt     No Known Problems Maternal Aunt     No Known Problems Paternal Aunt     Cancer Brother         Throat canver       Social History     Socioeconomic History    Marital status:      Spouse name: None    Number of children: None    Years of education: None    Highest education level: None   Occupational History    None   Tobacco Use    Smoking status: Former     Current packs/day: 1.00     Average packs/day: 1 pack/day for 59.0 years (59.0 ttl pk-yrs)     Types: Cigarettes     Start date:      Passive exposure: Past    Smokeless tobacco: Never    Tobacco comments:     Quit    Vaping Use    Vaping status: Never Used   Substance and Sexual Activity    Alcohol use: Yes     Alcohol/week: 5.0 standard drinks of alcohol     Types: 5 Glasses of wine per week    Drug use: Never    Sexual activity: Not Currently     Partners: Male     Birth control/protection: Post-menopausal   Other Topics Concern    None   Social History Narrative    None     Social Drivers of Health     Financial Resource Strain: Low Risk  (2023)    Overall Financial Resource Strain (CARDIA)     Difficulty of Paying Living Expenses: Not hard at all   Food Insecurity: No Food Insecurity (10/21/2024)    Nursing - Inadequate Food Risk Classification     Worried About Running Out of Food in the Last Year: Never true     Ran Out of Food in the Last Year: Never true      Ran Out of Food in the Last Year: Not on file   Transportation Needs: No Transportation Needs (10/21/2024)    PRAPARE - Transportation     Lack of Transportation (Medical): No     Lack of Transportation (Non-Medical): No   Physical Activity: Not on file   Stress: Not on file   Social Connections: Not on file   Intimate Partner Violence: Not on file   Housing Stability: Low Risk  (10/21/2024)    Housing Stability Vital Sign     Unable to Pay for Housing in the Last Year: No     Number of Times Moved in the Last Year: 0     Homeless in the Last Year: No         Current Facility-Administered Medications:     amLODIPine (NORVASC) tablet 5 mg, 5 mg, Oral, Daily, Joss Kevin MD    cyanocobalamin (VITAMIN B-12) tablet 1,000 mcg, 1,000 mcg, Oral, Daily, Joss Kevin MD    folic acid (FOLVITE) tablet 1 mg, 1 mg, Oral, Daily, Joss Kevin MD    gabapentin (NEURONTIN) capsule 100 mg, 100 mg, Oral, BID, 100 mg at 12/31/24 0547 **AND** gabapentin (NEURONTIN) capsule 300 mg, 300 mg, Oral, HS, Joss Kevin MD    levETIRAcetam (KEPPRA) tablet 1,000 mg, 1,000 mg, Oral, Q12H KRISS, Joss Kevin MD    levothyroxine tablet 50 mcg, 50 mcg, Oral, Early Morning, Joss Kevin MD, 50 mcg at 12/31/24 0547    pantoprazole (PROTONIX) EC tablet 40 mg, 40 mg, Oral, Early Morning, Joss Kevin MD, 40 mg at 12/31/24 0546    predniSONE tablet 20 mg, 20 mg, Oral, Daily, Joss Kevin MD    rivaroxaban (XARELTO) tablet 20 mg, 20 mg, Oral, Daily With Breakfast, Joss Kevin MD    senna (SENOKOT) tablet 8.6 mg, 1 tablet, Oral, Daily, Joss Kevin MD    [START ON 1/1/2025] sulfamethoxazole-trimethoprim (BACTRIM DS) 800-160 mg per tablet 1 tablet, 1 tablet, Oral, Once per day on Monday Wednesday Friday, Joss Kevin MD    Current Outpatient Medications:     acetaminophen (TYLENOL) 325 mg tablet, Take 2 tablets (650 mg total) by mouth every 6 (six) hours as needed for mild pain or moderate pain Taking as needed., Disp: , Rfl:      "amLODIPine (NORVASC) 5 mg tablet, Take 1 tablet (5 mg total) by mouth daily, Disp: 30 tablet, Rfl: 0    folic acid (FOLVITE) 1 mg tablet, Take 1 tablet (1 mg total) by mouth daily, Disp: 30 tablet, Rfl: 6    levETIRAcetam (KEPPRA) 1000 MG tablet, Take 1 tablet (1,000 mg total) by mouth every 12 (twelve) hours, Disp: 60 tablet, Rfl: 0    levothyroxine 50 mcg tablet, Take 1 tablet (50 mcg total) by mouth daily in the early morning, Disp: 30 tablet, Rfl: 0    pantoprazole (PROTONIX) 40 mg tablet, Take 1 tablet (40 mg total) by mouth daily in the early morning, Disp: 30 tablet, Rfl: 5    predniSONE 10 mg tablet, Take 1 tablet (10 mg total) by mouth daily, Disp: 60 tablet, Rfl: 1    Probiotic Product (PROBIOTIC DAILY PO), Take 1 capsule by mouth daily ON HOLD, Disp: , Rfl:     rivaroxaban (Xarelto) 20 mg tablet, Take 1 tablet (20 mg total) by mouth daily with breakfast, Disp: 30 tablet, Rfl: 0    senna (SENOKOT) 8.6 MG tablet, Take 1 tablet by mouth daily Takes one every other day, Disp: , Rfl:     sulfamethoxazole-trimethoprim (BACTRIM DS) 800-160 mg per tablet, Take 1 tablet by mouth daily Starting 12/24, you can take one tablet Monday, Wednesday, and Fridays. Do not start before December 24, 2024., Disp: 36 tablet, Rfl: 4    vitamin B-12 (VITAMIN B-12) 1,000 mcg tablet, Take 1 tablet (1,000 mcg total) by mouth daily, Disp: 90 tablet, Rfl: 1    gabapentin (NEURONTIN) 100 mg capsule, Take 1 capsule PO in the AM  and 3 capsules PO HS. (Patient taking differently: Take 1 capsule PO in the morning, 1 in the afternoonm and 3 capsules PO HS.), Disp: , Rfl:     Not in a hospital admission.    Allergies   Allergen Reactions    Doxycycline Headache    Keflex [Cephalexin] Rash    Neomycin-Polymyxin-Dexameth Eye Swelling         Physical Exam:     /70 (BP Location: Right arm)   Pulse 69   Temp 98.5 °F (36.9 °C) (Oral)   Resp 18   Ht 5' 2\" (1.575 m)   Wt 53.1 kg (117 lb)   SpO2 94%   BMI 21.40 kg/m²     Physical " Exam  Constitutional:       General: She is not in acute distress.     Appearance: Normal appearance. She is not ill-appearing.   HENT:      Head: Normocephalic and atraumatic.      Right Ear: External ear normal.      Left Ear: External ear normal.      Nose: Nose normal.      Mouth/Throat:      Mouth: Mucous membranes are moist.      Pharynx: Oropharynx is clear. No oropharyngeal exudate or posterior oropharyngeal erythema.   Eyes:      General:         Right eye: No discharge.         Left eye: No discharge.      Extraocular Movements: Extraocular movements intact.      Conjunctiva/sclera: Conjunctivae normal.      Pupils: Pupils are equal, round, and reactive to light.   Cardiovascular:      Rate and Rhythm: Normal rate and regular rhythm.      Pulses: Normal pulses.      Heart sounds: Normal heart sounds.   Pulmonary:      Effort: Pulmonary effort is normal. No respiratory distress.      Breath sounds: No wheezing or rales.   Abdominal:      General: There is no distension.      Tenderness: There is no abdominal tenderness.   Musculoskeletal:      Right lower leg: No edema.      Left lower leg: No edema.   Skin:     Capillary Refill: Capillary refill takes less than 2 seconds.      Coloration: Skin is not jaundiced.      Findings: No rash.   Neurological:      Mental Status: She is alert.      Gait: Gait abnormal.   Psychiatric:         Mood and Affect: Mood normal.         Behavior: Behavior normal.         Thought Content: Thought content normal.         Judgment: Judgment normal.           Recent Results (from the past 48 hours)   CBC and differential    Collection Time: 12/30/24  2:42 PM   Result Value Ref Range    WBC 8.11 4.31 - 10.16 Thousand/uL    RBC 3.34 (L) 3.81 - 5.12 Million/uL    Hemoglobin 10.2 (L) 11.5 - 15.4 g/dL    Hematocrit 32.9 (L) 34.8 - 46.1 %    MCV 99 (H) 82 - 98 fL    MCH 30.5 26.8 - 34.3 pg    MCHC 31.0 (L) 31.4 - 37.4 g/dL    RDW 15.7 (H) 11.6 - 15.1 %    MPV 8.5 (L) 8.9 - 12.7 fL     "Platelets 294 149 - 390 Thousands/uL    nRBC 0 /100 WBCs    Segmented % 89 (H) 43 - 75 %    Immature Grans % 2 0 - 2 %    Lymphocytes % 3 (L) 14 - 44 %    Monocytes % 5 4 - 12 %    Eosinophils Relative 0 0 - 6 %    Basophils Relative 1 0 - 1 %    Absolute Neutrophils 7.29 1.85 - 7.62 Thousands/µL    Absolute Immature Grans 0.17 0.00 - 0.20 Thousand/uL    Absolute Lymphocytes 0.20 (L) 0.60 - 4.47 Thousands/µL    Absolute Monocytes 0.37 0.17 - 1.22 Thousand/µL    Eosinophils Absolute 0.03 0.00 - 0.61 Thousand/µL    Basophils Absolute 0.05 0.00 - 0.10 Thousands/µL   Comprehensive metabolic panel    Collection Time: 12/30/24  2:42 PM   Result Value Ref Range    Sodium 140 135 - 147 mmol/L    Potassium 4.2 3.5 - 5.3 mmol/L    Chloride 105 96 - 108 mmol/L    CO2 26 21 - 32 mmol/L    ANION GAP 9 4 - 13 mmol/L    BUN 20 5 - 25 mg/dL    Creatinine 1.14 0.60 - 1.30 mg/dL    Glucose 103 65 - 140 mg/dL    Calcium 9.3 8.4 - 10.2 mg/dL    AST 10 (L) 13 - 39 U/L    ALT 7 7 - 52 U/L    Alkaline Phosphatase 44 34 - 104 U/L    Total Protein 7.0 6.4 - 8.4 g/dL    Albumin 4.1 3.5 - 5.0 g/dL    Total Bilirubin 0.27 0.20 - 1.00 mg/dL    eGFR 47 ml/min/1.73sq m   HS Troponin 0hr (reflex protocol)    Collection Time: 12/30/24  2:42 PM   Result Value Ref Range    hs TnI 0hr 4 \"Refer to ACS Flowchart\"- see link ng/L   FLU/COVID Rapid Antigen (30 min. TAT) - Preferred screening test in ED    Collection Time: 12/30/24  2:42 PM    Specimen: Nose; Nares   Result Value Ref Range    SARS COV Rapid Antigen Negative Negative    Influenza A Rapid Antigen Negative Negative    Influenza B Rapid Antigen Negative Negative   B-Type Natriuretic Peptide(BNP)    Collection Time: 12/30/24  2:42 PM   Result Value Ref Range    BNP 52 0 - 100 pg/mL   HS Troponin I 2hr    Collection Time: 12/30/24  7:27 PM   Result Value Ref Range    hs TnI 2hr 4 \"Refer to ACS Flowchart\"- see link ng/L    Delta 2hr hsTnI 0 <20 ng/L   Procalcitonin    Collection Time: 12/30/24  " 7:27 PM   Result Value Ref Range    Procalcitonin 0.07 <=0.25 ng/ml   Protime-INR    Collection Time: 12/30/24  8:50 PM   Result Value Ref Range    Protime 17.7 (H) 12.3 - 15.0 seconds    INR 1.38 (H) 0.85 - 1.19   APTT    Collection Time: 12/30/24  8:50 PM   Result Value Ref Range    PTT 41 (H) 23 - 34 seconds   Basic metabolic panel    Collection Time: 12/31/24  5:53 AM   Result Value Ref Range    Sodium 139 135 - 147 mmol/L    Potassium 3.8 3.5 - 5.3 mmol/L    Chloride 108 96 - 108 mmol/L    CO2 24 21 - 32 mmol/L    ANION GAP 7 4 - 13 mmol/L    BUN 13 5 - 25 mg/dL    Creatinine 0.91 0.60 - 1.30 mg/dL    Glucose 81 65 - 140 mg/dL    Glucose, Fasting 81 65 - 99 mg/dL    Calcium 8.5 8.4 - 10.2 mg/dL    eGFR 62 ml/min/1.73sq m   CBC (With Platelets)    Collection Time: 12/31/24  5:53 AM   Result Value Ref Range    WBC 5.13 4.31 - 10.16 Thousand/uL    RBC 2.84 (L) 3.81 - 5.12 Million/uL    Hemoglobin 8.6 (L) 11.5 - 15.4 g/dL    Hematocrit 27.8 (L) 34.8 - 46.1 %    MCV 98 82 - 98 fL    MCH 30.3 26.8 - 34.3 pg    MCHC 30.9 (L) 31.4 - 37.4 g/dL    RDW 15.7 (H) 11.6 - 15.1 %    Platelets 216 149 - 390 Thousands/uL    MPV 8.3 (L) 8.9 - 12.7 fL   Procalcitonin    Collection Time: 12/31/24  5:53 AM   Result Value Ref Range    Procalcitonin 0.06 <=0.25 ng/ml       CTA chest pe study  Result Date: 12/30/2024  Narrative: CTA - CHEST WITH IV CONTRAST - PULMONARY ANGIOGRAM INDICATION: shortness of breath; currently on prednisone, history of lung cancer, history of 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 COMPARISON: CTA PE study 11/24/2024. TECHNIQUE: CTA examination of the chest was performed using angiographic technique according to a protocol specifically tailored to evaluate for pulmonary embolism. Multiplanar 2D reformatted images were created from the source data. In addition, coronal  3D MIP postprocessing was performed on the acquisition scanner. Radiation dose length product (DLP) for this visit: 191  mGy-cm . This examination, like all CT scans performed in the [facility], was performed utilizing techniques to minimize radiation dose exposure, including the use of iterative reconstruction and automated exposure control. IV Contrast: 85 mL of iohexol (OMNIPAQUE) FINDINGS: PULMONARY ARTERIAL TREE: Interval resolution of right middle lobe/right lower lobe pulmonary embolism. Interval near complete resolution of previously seen left lower lobe pulmonary embolism with questionable minimal residual linear thrombus in this region (301/109). LUNGS: Spiculated right upper lobe nodule measures 2.5 x 2.4 cm (3/62), slightly larger than on prior exam. Similar appearance of right upper lobe groundglass opacities with interval increase in opacities in the superior right lower lobe. Cluster of micronodules in the medial right upper lobe (304/) may be infectious or inflammatory. Additional groundglass and reticular opacities in the left upper lobe. New lingular 6 mm nodule (304/173) and left lower lobe 7 mm nodule (304/170). Mild emphysema. PLEURA: Left calcified basilar pleural plaque (301/158). HEART/GREAT VESSELS: Heart is normal in size. Coronary artery calcifications. No thoracic aortic aneurysm. MEDIASTINUM AND SUDEEP: Unremarkable. CHEST WALL AND LOWER NECK: Unremarkable. VISUALIZED STRUCTURES IN THE UPPER ABDOMEN: Few calculi within the partially imaged right kidney measuring up to 3 mm without hydronephrosis in the visualized portion of the right kidney. Exophytic lesion arising from the upper pole of the right kidney measuring approximately 2.6 cm (602/112) in the region in which metastasis was suspected on prior CT. OSSEOUS STRUCTURES: Degenerative changes of the spine.     Impression: No new pulmonary embolism. Resolution of previously seen right-sided pulmonary embolism. Near complete resolution of previously seen left lower lobe pulmonary embolism with questionable minimal residual linear  thrombus in this region. Spiculated right upper lobe nodule measures slightly larger than on prior exam. Increase in right lung groundglass opacities which may represent worsening pneumonia and/or malignancy. Incompletely evaluated right upper pole lesion appears larger than on prior exam, suspicious for metastasis. Dedicated abdominal imaging is recommended. Examination was marked in EPIC for immediate notification. Workstation performed: [id number]     MRI Brain BT w wo Contrast  Result Date: 12/29/2024  Narrative: MRI BRAIN WITH AND WITHOUT CONTRAST INDICATION: C79.31: Secondary malignant neoplasm of brain. COMPARISON: MRI brain 10/21/2024. MRI brain brain tumor protocol 10/8/2024. TECHNIQUE: Multiplanar, multisequence imaging of the brain and sella was performed before and after gadolinium administration. IV Contrast:  10 mL of Gadobutrol injection (SINGLE-DOSE) IMAGE QUALITY:   Motion-degraded, which reduces diagnostic sensitivity. FINDINGS: Multiple enhancing metastatic lesions as detailed below with comparison made to MRI brain tumor protocol 10/8/2024. 1. Stable tiny, subtle enhancing lesion in the left frontal lobe on series 13, image 121. 2. Increased size of a now 1.1 cm lesion in the left paramedian parietal lobe on series 13, image 105, previously 0.6 cm. 3. Decreased size of a now tiny lesion in the left paramedian frontal lobe on series 13, image 102 that previously measured 3 mm. 4. Increased size of a now 3.5 cm left occipital lesion (centrally hypoenhancing) on series 13, image 81 that previously measured 2.2 cm. 5. Left temporal lesion that was previously 4 mm is not as conspicuous/barely perceptible on the current study. 6. Decreased size of a now tiny lesion in the midline cerebellar vermis on series 13, image 47, previously 2 mm. There is a new lesion identified in the left frontal lobe on series 13, image 106 that measures 4 mm. There is slightly increased vasogenic edema in the left parietal  and occipital lobes, when compared to the prior study, with mild mass effect on the left lateral ventricle. Slightly less conspicuous enhancement involving the bilateral cranial nerve VII and IACs. No acute infarct. Mild chronic ischemic changes of the white matter. PERFUSION: While most of the lesions above are too small to characterize by perfusion, there is no appreciable elevated perfusion associated with the left occipital and left parietal lobe lesions. VENTRICLES: As above. No hydrocephalus. SELLA AND PITUITARY GLAND:  Normal. ORBITS: No acute abnormality. PARANASAL SINUSES: Trace mucosal thickening. VASCULATURE:  Evaluation of the major intracranial vasculature demonstrates appropriate flow voids. CALVARIUM AND SKULL BASE: No acute abnormality. EXTRACRANIAL SOFT TISSUES: No acute abnormality.     Impression: 1. New enhancing lesion in the left frontal lobe, which is suspicious for metastasis. 2. Increased size of left occipital and left paramedian parietal lobe lesions without appreciable elevated perfusion. Findings favor posttreatment sequelae/radiation effects though recommend continued attention on follow-up. Of note, there is slightly increased associated vasogenic edema in the left cerebral hemisphere, as described above. 3. The other lesions are stable to decreased/less conspicuous compared to the prior study. 4. 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 Recommend continued clinical and imaging surveillance. The study was marked in EPIC for significant notification. Workstation performed: [id number]       Labs and pertinent reports reviewed.      This note has been generated by voice recognition software system.  Therefore, there may be spelling, grammar, and or syntax errors. Please contact if questions arise.    Eldon Boyer,    Hematology and Medical Oncology - PGY V  [facility]  [facility]

## 2025-01-01 ENCOUNTER — TELEPHONE (OUTPATIENT)
Dept: OTHER | Facility: OTHER | Age: 75
End: 2025-01-01

## 2025-01-01 ENCOUNTER — HOME CARE VISIT (OUTPATIENT)
Dept: HOME HOSPICE | Facility: HOSPICE | Age: 75
End: 2025-01-01
Payer: MEDICARE

## 2025-01-01 ENCOUNTER — NURSING HOME VISIT (OUTPATIENT)
Dept: GERIATRICS | Facility: OTHER | Age: 75
End: 2025-01-01
Payer: MEDICARE

## 2025-01-01 ENCOUNTER — HOME CARE VISIT (OUTPATIENT)
Dept: HOME HEALTH SERVICES | Facility: HOME HEALTHCARE | Age: 75
End: 2025-01-01
Payer: MEDICARE

## 2025-01-01 ENCOUNTER — HOME CARE VISIT (OUTPATIENT)
Dept: HOME HEALTH SERVICES | Facility: HOME HEALTHCARE | Age: 75
End: 2025-01-01
Attending: FAMILY MEDICINE
Payer: MEDICARE

## 2025-01-01 ENCOUNTER — HOSPICE ADMISSION (OUTPATIENT)
Dept: HOME HOSPICE | Facility: HOSPICE | Age: 75
End: 2025-01-01
Payer: MEDICARE

## 2025-01-01 VITALS — TEMPERATURE: 99.1 F | HEART RATE: 116 BPM | HEIGHT: 62 IN | RESPIRATION RATE: 20 BRPM | BODY MASS INDEX: 21.25 KG/M2

## 2025-01-01 VITALS — RESPIRATION RATE: 18 BRPM

## 2025-01-01 DIAGNOSIS — C79.31 LUNG CANCER METASTATIC TO BRAIN (HCC): Primary | ICD-10-CM

## 2025-01-01 DIAGNOSIS — C34.90 LUNG CANCER METASTATIC TO BRAIN (HCC): Primary | ICD-10-CM

## 2025-01-01 DIAGNOSIS — C79.31 METASTATIC CANCER TO BRAIN (HCC): ICD-10-CM

## 2025-01-01 DIAGNOSIS — R53.81 PHYSICAL DECONDITIONING: ICD-10-CM

## 2025-01-01 DIAGNOSIS — G47.9 SLEEP DISTURBANCE: ICD-10-CM

## 2025-01-01 DIAGNOSIS — K21.00 GASTROESOPHAGEAL REFLUX DISEASE WITH ESOPHAGITIS, UNSPECIFIED WHETHER HEMORRHAGE: ICD-10-CM

## 2025-01-01 DIAGNOSIS — R45.1 RESTLESSNESS: ICD-10-CM

## 2025-01-01 DIAGNOSIS — Z51.5 HOSPICE CARE: Primary | ICD-10-CM

## 2025-01-01 DIAGNOSIS — C79.9 METASTATIC ADENOCARCINOMA (HCC): ICD-10-CM

## 2025-01-01 DIAGNOSIS — C34.90 LUNG CANCER METASTATIC TO BRAIN (HCC): ICD-10-CM

## 2025-01-01 DIAGNOSIS — C49.5: ICD-10-CM

## 2025-01-01 DIAGNOSIS — J96.91 RESPIRATORY FAILURE WITH HYPOXIA, UNSPECIFIED CHRONICITY (HCC): ICD-10-CM

## 2025-01-01 DIAGNOSIS — G89.3 CANCER ASSOCIATED PAIN: ICD-10-CM

## 2025-01-01 DIAGNOSIS — Z79.899 ENCOUNTER FOR MEDICATION REVIEW: Primary | ICD-10-CM

## 2025-01-01 DIAGNOSIS — Z51.5 PALLIATIVE CARE BY SPECIALIST: ICD-10-CM

## 2025-01-01 DIAGNOSIS — C79.31 LUNG CANCER METASTATIC TO BRAIN (HCC): ICD-10-CM

## 2025-01-01 DIAGNOSIS — E44.0 MODERATE PROTEIN-CALORIE MALNUTRITION (HCC): ICD-10-CM

## 2025-01-01 LAB
ANION GAP SERPL CALCULATED.3IONS-SCNC: 6 MMOL/L (ref 4–13)
BASOPHILS # BLD AUTO: 0.04 THOUSANDS/ΜL (ref 0–0.1)
BASOPHILS NFR BLD AUTO: 1 % (ref 0–1)
BUN SERPL-MCNC: 17 MG/DL (ref 5–25)
CALCIUM SERPL-MCNC: 9.4 MG/DL (ref 8.4–10.2)
CHLORIDE SERPL-SCNC: 105 MMOL/L (ref 96–108)
CO2 SERPL-SCNC: 27 MMOL/L (ref 21–32)
CREAT SERPL-MCNC: 0.99 MG/DL (ref 0.6–1.3)
EOSINOPHIL # BLD AUTO: 0 THOUSAND/ΜL (ref 0–0.61)
EOSINOPHIL NFR BLD AUTO: 0 % (ref 0–6)
ERYTHROCYTE [DISTWIDTH] IN BLOOD BY AUTOMATED COUNT: 15.2 % (ref 11.6–15.1)
GFR SERPL CREATININE-BSD FRML MDRD: 56 ML/MIN/1.73SQ M
GLUCOSE P FAST SERPL-MCNC: 127 MG/DL (ref 65–99)
GLUCOSE SERPL-MCNC: 127 MG/DL (ref 65–140)
HCT VFR BLD AUTO: 31.1 % (ref 34.8–46.1)
HGB BLD-MCNC: 9.5 G/DL (ref 11.5–15.4)
IMM GRANULOCYTES # BLD AUTO: 0.11 THOUSAND/UL (ref 0–0.2)
IMM GRANULOCYTES NFR BLD AUTO: 2 % (ref 0–2)
LYMPHOCYTES # BLD AUTO: 0.23 THOUSANDS/ΜL (ref 0.6–4.47)
LYMPHOCYTES NFR BLD AUTO: 3 % (ref 14–44)
MCH RBC QN AUTO: 30 PG (ref 26.8–34.3)
MCHC RBC AUTO-ENTMCNC: 30.5 G/DL (ref 31.4–37.4)
MCV RBC AUTO: 98 FL (ref 82–98)
MONOCYTES # BLD AUTO: 0.38 THOUSAND/ΜL (ref 0.17–1.22)
MONOCYTES NFR BLD AUTO: 6 % (ref 4–12)
NEUTROPHILS # BLD AUTO: 6.14 THOUSANDS/ΜL (ref 1.85–7.62)
NEUTS SEG NFR BLD AUTO: 88 % (ref 43–75)
NRBC BLD AUTO-RTO: 0 /100 WBCS
PLATELET # BLD AUTO: 318 THOUSANDS/UL (ref 149–390)
PMV BLD AUTO: 8.8 FL (ref 8.9–12.7)
POTASSIUM SERPL-SCNC: 4.6 MMOL/L (ref 3.5–5.3)
RBC # BLD AUTO: 3.17 MILLION/UL (ref 3.81–5.12)
SODIUM SERPL-SCNC: 138 MMOL/L (ref 135–147)
WBC # BLD AUTO: 6.9 THOUSAND/UL (ref 4.31–10.16)

## 2025-01-01 PROCEDURE — 10330087 HSPC SERVICE INTENSITY ADD-ON

## 2025-01-01 PROCEDURE — 85025 COMPLETE CBC W/AUTO DIFF WBC: CPT

## 2025-01-01 PROCEDURE — 80048 BASIC METABOLIC PNL TOTAL CA: CPT

## 2025-01-01 PROCEDURE — G0299 HHS/HOSPICE OF RN EA 15 MIN: HCPCS

## 2025-01-01 PROCEDURE — 99232 SBSQ HOSP IP/OBS MODERATE 35: CPT | Performed by: INTERNAL MEDICINE

## 2025-01-01 PROCEDURE — 97166 OT EVAL MOD COMPLEX 45 MIN: CPT

## 2025-01-01 PROCEDURE — 99310 SBSQ NF CARE HIGH MDM 45: CPT | Performed by: NURSE PRACTITIONER

## 2025-01-01 RX ORDER — HYDROMORPHONE HYDROCHLORIDE 4 MG/1
4 TABLET ORAL
Qty: 5 TABLET | Refills: 0 | Status: SHIPPED | OUTPATIENT
Start: 2025-01-01 | End: 2025-01-01 | Stop reason: SDUPTHER

## 2025-01-01 RX ORDER — QUETIAPINE FUMARATE 25 MG/1
12.5 TABLET, FILM COATED ORAL
Status: SHIPPED
Start: 2025-01-01 | End: 2025-06-27

## 2025-01-01 RX ORDER — LORAZEPAM 2 MG/ML
CONCENTRATE ORAL
Qty: 30 ML | Refills: 0 | Status: SHIPPED | OUTPATIENT
Start: 2025-01-01 | End: 2025-06-27

## 2025-01-01 RX ORDER — LORAZEPAM 0.5 MG/1
0.5 TABLET ORAL EVERY 4 HOURS PRN
Qty: 12 TABLET | Refills: 0 | Status: SHIPPED | OUTPATIENT
Start: 2025-01-01 | End: 2025-01-01

## 2025-01-01 RX ORDER — LORAZEPAM 0.5 MG/1
0.5 TABLET ORAL EVERY 4 HOURS PRN
COMMUNITY
End: 2025-01-01 | Stop reason: SDUPTHER

## 2025-01-01 RX ORDER — HYDROMORPHONE HYDROCHLORIDE 4 MG/1
4 TABLET ORAL 2 TIMES DAILY PRN
COMMUNITY
End: 2025-01-01

## 2025-01-01 RX ORDER — MORPHINE SULFATE 20 MG/ML
SOLUTION ORAL
Qty: 30 ML | Refills: 0 | Status: SHIPPED | OUTPATIENT
Start: 2025-01-01 | End: 2025-06-27

## 2025-01-01 RX ORDER — LORAZEPAM 0.5 MG/1
TABLET ORAL
Qty: 30 TABLET | Refills: 0 | Status: SHIPPED | OUTPATIENT
Start: 2025-01-01 | End: 2025-06-27

## 2025-01-01 RX ORDER — ONDANSETRON 4 MG/1
4 TABLET, ORALLY DISINTEGRATING ORAL EVERY 6 HOURS PRN
Status: DISCONTINUED | OUTPATIENT
Start: 2025-01-01 | End: 2025-01-02 | Stop reason: HOSPADM

## 2025-01-01 RX ADMIN — FOLIC ACID 1 MG: 1 TABLET ORAL at 08:41

## 2025-01-01 RX ADMIN — GABAPENTIN 100 MG: 100 CAPSULE ORAL at 05:10

## 2025-01-01 RX ADMIN — SULFAMETHOXAZOLE AND TRIMETHOPRIM 1 TABLET: 800; 160 TABLET ORAL at 08:41

## 2025-01-01 RX ADMIN — PANTOPRAZOLE SODIUM 40 MG: 40 TABLET, DELAYED RELEASE ORAL at 05:10

## 2025-01-01 RX ADMIN — ONDANSETRON 4 MG: 4 TABLET, ORALLY DISINTEGRATING ORAL at 06:53

## 2025-01-01 RX ADMIN — RIVAROXABAN 20 MG: 20 TABLET, FILM COATED ORAL at 08:41

## 2025-01-01 RX ADMIN — LEVETIRACETAM 1000 MG: 250 TABLET, FILM COATED ORAL at 21:04

## 2025-01-01 RX ADMIN — GABAPENTIN 300 MG: 300 CAPSULE ORAL at 21:04

## 2025-01-01 RX ADMIN — LEVOTHYROXINE SODIUM 50 MCG: 50 TABLET ORAL at 05:10

## 2025-01-01 RX ADMIN — DEXAMETHASONE SODIUM PHOSPHATE 4 MG: 4 INJECTION INTRA-ARTICULAR; INTRALESIONAL; INTRAMUSCULAR; INTRAVENOUS; SOFT TISSUE at 13:11

## 2025-01-01 RX ADMIN — DEXAMETHASONE SODIUM PHOSPHATE 4 MG: 4 INJECTION INTRA-ARTICULAR; INTRALESIONAL; INTRAMUSCULAR; INTRAVENOUS; SOFT TISSUE at 21:05

## 2025-01-01 RX ADMIN — LEVETIRACETAM 1000 MG: 250 TABLET, FILM COATED ORAL at 08:41

## 2025-01-01 RX ADMIN — CYANOCOBALAMIN TAB 500 MCG 1000 MCG: 500 TAB at 08:41

## 2025-01-01 RX ADMIN — GABAPENTIN 100 MG: 100 CAPSULE ORAL at 11:34

## 2025-01-01 RX ADMIN — AMLODIPINE BESYLATE 5 MG: 5 TABLET ORAL at 08:41

## 2025-01-01 RX ADMIN — DEXAMETHASONE SODIUM PHOSPHATE 4 MG: 4 INJECTION INTRA-ARTICULAR; INTRALESIONAL; INTRAMUSCULAR; INTRAVENOUS; SOFT TISSUE at 05:10

## 2025-01-01 NOTE — ASSESSMENT & PLAN NOTE
Presentation:  Worsening shortness of breath with exertion.    Investigations:  CTA:  No new pulmonary embolism.   Resolution of previously seen right-sided pulmonary embolism.   Spiculated right upper lobe nodule measures slightly larger than on prior exam.   Increase in right lung groundglass opacities which may represent worsening pneumonia and/or malignancy.  Incompletely evaluated right upper pole lesion appears larger than on prior exam, suspicious for metastasis     Differential Dx:  Infection versus progression of malignancy vs drug-induced pneumonitis.  Less likely pneumonia given no leukocytosis and negative Pro-Du.  Had radiation therapy to the lungs from May/23 to July/24.  Keytruda given October/24 and November/24    Assessment:  Patient without supplemental oxygen today.  Lung sounds were unremarkable on physical exam.  Following discussion with medical oncology, seems like she may have pneumonitis related to Keytruda.  Symptoms most likely related to combination of pneumonitis and worsening malignancy.    Plan:  Dexamethasone 4 mg Q8 hours for a week,   Then 4 mg twice daily for a week   Then 4 + 2 (AM + PM) for one week   Then 2+2 for a week   Followed by 2 mg QD.  Outpatient follow up with med onc and rad onc for further eval and adjustment as appropriate.   Medical oncology will hold off on further immunotherapy until reevaluation in the outpatient setting.

## 2025-01-01 NOTE — OCCUPATIONAL THERAPY NOTE
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 2023    Due to have durgery June 2024    Essential thrombocytosis (HCC)     controlled with medication    Hyperlipidemia     Hypertension     Mass in chest     Nodular goiter     Osteoporosis     Pneumonia Oct 16, 2023    Also  Feb 2024    Psoriasis     SIRS (systemic inflammatory response syndrome) (HCC) 06/22/2024     Past Surgical History  Past Surgical History:   Procedure Laterality Date    APPENDECTOMY      BREAST CYST EXCISION Right 2009    COLONOSCOPY  10/31/2018    DXA PROCEDURE (HISTORICAL)  04/21/2017    IR BIOPSY OTHER  9/12/2024    IR LUMBAR PUNCTURE  9/12/2024    MAMMO (HISTORICAL)      8-24-18    MAMMO NEEDLE LOCALIZATION RIGHT (ALL INC) Right 07/13/2009    SKIN LESION EXCISION      bridge of nose           01/01/25 1304   OT Last Visit   OT Visit Date 01/01/25   Note Type   Note type Evaluation   Pain Assessment   Pain Assessment Tool 0-10   Pain Score No Pain   Hospital Pain Intervention(s) Repositioned;Ambulation/increased activity;Emotional support   Restrictions/Precautions   Weight Bearing Precautions Per Order No   Other Precautions Multiple lines   Home Living   Type of Home House  (2  with 6 HARRY)   Home Layout Two level;Bed/bath upstairs;Access   Bathroom Shower/Tub Tub/shower unit   Bathroom Toilet Standard   Bathroom Equipment Grab bars in shower   Bathroom Accessibility Accessible   Home Equipment Other  "(Comment)  (No DME)   Additional Comments Pt reports living alone in a 2 SH with 6 HARRY, no 1/2 on first floor, full flight of stairs to access main bathroom and bedroom; no DME PTA   Prior Function   Level of [place] Independent with ADLs;Independent with functional mobility;Independent with IADLS   Lives With Alone   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;Home health  (Caregiver who comes everyday for about 5 hours)   IADLs Family/Friend/Other provides transportation;Independent with meal prep;Family/Friend/Other provides medication management   Falls in the last 6 months 0   Vocational Retired   Comments (-) driving reporting that her caregiver assists with transportation needs; reports that she typically is independent with all functional needs however after she is d/c from hospital, her caregiver helps manager her medications/any new medications she gets   Lifestyle   Autonomy PTA, pt was independent in ADLs/IADLs and uses no DME for functional mobility; (-) driving   Reciprocal Relationships Lives alone; supportive caregiver who comes daily for approx 5 hours   Service to Others Retired reporting that she previously worked in [place] at a [employer]   Intrinsic Gratification Enjoys being active   Subjective   Subjective \"I am able to do all those things myself\"   ADL   Where Assessed Supine, bed   Eating Assistance 7  Independent   Grooming Assistance 7  Independent   UB Bathing Assistance 7  Independent   LB Bathing Assistance 7  Independent   UB Dressing Assistance 7  Independent   LB Dressing Assistance 7  Independent   Toileting Assistance  7  Independent   Functional Assistance 7  Independent   Bed Mobility   Supine to Sit 7  Independent   Sit to Supine 7  Independent   Additional Comments At end of session, pt left sitting upright in bed with all functional needs in reach   Transfers   Sit to Stand 7  Independent   Stand to Sit 7  Independent   Additional Comments w/ no DME   Functional Mobility   Functional Mobility 6 "  Modified independent   Additional Comments Pt completed long household functional mobility distances @ Mod I level w/ no DME   Balance   Static Sitting Normal   Dynamic Sitting Good   Static Standing Fair +   Dynamic Standing Fair +   Ambulatory Fair +   Activity Tolerance   Activity Tolerance Patient tolerated treatment well   Medical Staff Made Aware Dr. Mccoy updated via Epic [place] chat   Nurse Made Aware RN cleared   LUE Assessment   LUE Assessment WFL   Hand Function   Gross Motor Coordination Functional   Fine Motor Coordination Functional   Vision - Complex Assessment   Additional Comments Pt reporting that she lost peripheral vision   Cognition   Overall Cognitive Status WFL   Arousal/Participation Alert;Responsive;Arousable;Cooperative   Attention Within functional limits   Orientation Level Oriented X4   Memory Within functional limits   Following Commands Follows all commands and directions without difficulty   Comments Pt very pleasant and cooperative   Assessment   Prognosis Fair   Assessment Pt is a very pleasant and cooperative 75 yo female who presented to [facility]  from heme-onc clinic for further evaluation of dyspnea on exertion. Per chart review, pt dx w/ lung cancer metastatic to brain. Pt  has a past medical history of Allergic (2022), Cancer (HCC), Cancer (HCC), Cancer (HCC), Cataract (Nov. 2023), Essential thrombocytosis (HCC), Hyperlipidemia, Hypertension, Mass in chest, Nodular goiter, Osteoporosis, Pneumonia (Oct 16, 2023), Psoriasis, and SIRS (systemic inflammatory response syndrome) (HCC) (06/22/2024). Pt with active OT orders and OT consulted to assess pt's functional status and occupational performance to determine safe d/c needs. Pt lives alone in a 2  with 6 HARRY, reporting she has a caregiver that visits daily for approx 5 hours. PTA, pt was independent in ADLs/IADLs and uses no DME for functional mobility. (-) driving. Discussed role and scope of OT in which pt was  receptive. At this time, pt is not demonstrating any significant occupational deficits and is functioning at a level of independent for bed mobility, functional transfers w/ no DME, Mod I for functional mobility w/ no DME, and UB/LB ADLs @ an independent level. From an OT standpoint, recommend discharge to home with increased support once medically stable. Pt was receptive regarding education on returning home safely with energy conservation techniques and demonstrated good carryover during occupational/functional performance. At this time, pt demonstrates good insight/safety awareness and does not express any concerns regarding performing ADL/IADL/functional mobility tasks. No further skilled acute care OT services are needed at this time. The patient's raw score on the AM-PAC Daily Activity Inpatient Short Form is 24. A raw score of greater than or equal to 19 suggests the patient may benefit from discharge to home. Please refer to the recommendation of the Occupational Therapist for safe discharge planning.  Recommend continued engagement in ADL/functional mobility tasks with nursing and restorative therapy staff as appropriate to promote the highest level of independence prior to discharge. OT is discharging pt from caseload at this time, please reconsult if needed.   Goals   Patient Goals To go home   Plan   OT Frequency Eval only   Discharge Recommendation   Rehab Resource Intensity Level, OT No post-acute rehabilitation needs   AM-PAC Daily Activity Inpatient   Lower Body Dressing 4   Bathing 4   Toileting 4   Upper Body Dressing 4   Grooming 4   Eating 4   Daily Activity Raw Score 24   Daily Activity Standardized Score (Calc for Raw Score >=11) 57.54   AM-PAC Applied Cognition Inpatient   Following a Speech/Presentation 4   Understanding Ordinary Conversation 4   Taking Medications 4   Remembering Where Things Are Placed or Put Away 4   Remembering List of 4-5 Errands 4   Taking Care of Complicated Tasks 4    Applied Cognition Raw Score 24   Applied Cognition Standardized Score 62.21   End of Consult   Education Provided Yes   Patient Position at End of Consult Supine;All needs within reach   Nurse Communication Nurse aware of consult       Madiha Bedolla MS, OTR/L

## 2025-01-01 NOTE — PROGRESS NOTES
Progress Note - Hospitalist   Name: Ayla Nye 74 y.o. female I MRN: [id number]  Unit/Bed#: -01 I Date of Admission: 12/30/2024   Date of Service: 1/1/2025 I Hospital Day: 0    Assessment & Plan  Lung cancer metastatic to brain (HCC)  Diagnosed Feb/2024  Completed cycle 2 chemo on 11/14.   Was scheduled for cycle 3 on 12/5, however chemo has been on hold d/t acute illness and concern for toxicity.  MRI brain 12/29 revealed:  New enhancing lesion in the left frontal lobe, which is suspicious for metastasis.   Increased size of left occipital and left paramedian parietal lobe lesions without appreciable elevated perfusion.   Findings favor posttreatment sequelae/radiation effects though recommend continued attention on follow-up.   Of note, there is slightly   increased associated vasogenic edema in the left cerebral hemisphere.  The other lesions are stable to decreased/less conspicuous compared to the prior study.   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   Follows with Dr. Gamboa in the outpatient setting.  Per discussion with radiation oncology, there was no need for urgent radiation to the new lesion found in the left frontal lobe.  Currently immunotherapy is on hold until further evaluation as outpatient.    Plan:  Steroid taper as listed below.  Dyspnea on exertion  Presentation:  Worsening shortness of breath with exertion.    Investigations:  CTA:  No new pulmonary embolism.   Resolution of previously seen right-sided pulmonary embolism.   Spiculated right upper lobe nodule measures slightly larger than on prior exam.   Increase in right lung groundglass opacities which may represent worsening pneumonia and/or malignancy.  Incompletely evaluated right upper pole lesion appears larger than on prior exam, suspicious for metastasis     Differential Dx:  Infection versus  progression of malignancy vs drug-induced pneumonitis.  Less likely pneumonia given no leukocytosis and negative Pro-Du.  Had radiation therapy to the lungs from May/23 to July/24.  Keytruda given October/24 and November/24    Assessment:  Patient without supplemental oxygen today.  Lung sounds were unremarkable on physical exam.  Following discussion with medical oncology, seems like she may have pneumonitis related to Keytruda.  Symptoms most likely related to combination of pneumonitis and worsening malignancy.    Plan:  Dexamethasone 4 mg Q8 hours for a week,   Then 4 mg twice daily for a week   Then 4 + 2 (AM + PM) for one week   Then 2+2 for a week   Followed by 2 mg QD.  Outpatient follow up with med onc and rad onc for further eval and adjustment as appropriate.   Medical oncology will hold off on further immunotherapy until reevaluation in the outpatient setting.  Imbalance  Increase prednisone to prior tolerated dose of 20 mg QD  PT/OT eval and treat  Recent PCP (pneumocystis jiroveci pneumonia)  Dx'd during Nov/Dec admission,   Completed course of atovaquone on 12/23/24  Started Bactrim DS 1 tablet M/W/F for PJP ppx  History of pulmonary embolism  Dx'd during Nov/Dec admission,   in the setting of malignancy  Continue Xarelto 20 mg QD  Anemia  Chronic since 2023  Stable/slightly above baseline  Hypertension  Continue home amlodipine  Hypothyroidism  Continue home levothyroxine    VTE Pharmacologic Prophylaxis: VTE Score: 7 High Risk (Score >/= 5) - Pharmacological DVT Prophylaxis Ordered: rivaroxaban (Xarelto). Sequential Compression Devices Ordered.    Mobility:   Basic Mobility Inpatient Raw Score: 18  JH-HLM Goal: 6: Walk 10 steps or more  JH-HLM Achieved: 8: Walk 250 feet ot more  JH-HLM Goal achieved. Continue to encourage appropriate mobility.    Patient Centered Rounds: I performed bedside rounds with nursing staff today.   Discussions with Specialists or Other Care Team Provider: Medical  oncology    Education and Discussions with Family / Patient: Updated  (daughter) via phone.    Current Length of Stay: 0 day(s)  Current Patient Status: Inpatient   Certification Statement: The patient will continue to require additional inpatient hospital stay due to Cancer  Discharge Plan: Anticipate discharge in 24-48 hrs to home.    Code Status: Level 3 - DNAR and DNI    Subjective   Patient seen and examined at bedside today.  There were no overnight events.  Reports feeling generally well.  We discussed likely drug-related pneumonitis.  Also talked about prolonged steroid taper with outpatient follow-up with oncology. Otherwise denies any new symptoms.    Objective :  Temp:  [97.5 °F (36.4 °C)-98.5 °F (36.9 °C)] 98 °F (36.7 °C)  HR:  [65-92] 65  BP: (110-141)/(59-78) 141/72  Resp:  [18-20] 18  SpO2:  [91 %-98 %] 91 %  O2 Device: None (Room air)    Body mass index is 21.4 kg/m².     Input and Output Summary (last 24 hours):   No intake or output data in the 24 hours ending 01/01/25 0902    Physical Exam  Vitals and nursing note reviewed.   Constitutional:       General: She is not in acute distress.     Appearance: She is well-developed.   HENT:      Head: Normocephalic and atraumatic.   Eyes:      Conjunctiva/sclera: Conjunctivae normal.   Cardiovascular:      Rate and Rhythm: Normal rate and regular rhythm.      Heart sounds: No murmur heard.  Pulmonary:      Effort: Pulmonary effort is normal. No respiratory distress.      Breath sounds: Normal breath sounds.   Abdominal:      Palpations: Abdomen is soft.      Tenderness: There is no abdominal tenderness.   Musculoskeletal:         General: No swelling.   Skin:     General: Skin is warm and dry.   Neurological:      Mental Status: She is alert.   Psychiatric:         Mood and Affect: Mood normal.         Lab Results: I have reviewed the following results:   Results from last 7 days   Lab Units 01/01/25  0527   WBC Thousand/uL 6.90   HEMOGLOBIN  g/dL 9.5*   HEMATOCRIT % 31.1*   PLATELETS Thousands/uL 318   SEGS PCT % 88*   LYMPHO PCT % 3*   MONO PCT % 6   EOS PCT % 0     Results from last 7 days   Lab Units 01/01/25  0527 12/31/24  0553 12/30/24  1442   SODIUM mmol/L 138   < > 140   POTASSIUM mmol/L 4.6   < > 4.2   CHLORIDE mmol/L 105   < > 105   CO2 mmol/L 27   < > 26   BUN mg/dL 17   < > 20   CREATININE mg/dL 0.99   < > 1.14   ANION GAP mmol/L 6   < > 9   CALCIUM mg/dL 9.4   < > 9.3   ALBUMIN g/dL  --   --  4.1   TOTAL BILIRUBIN mg/dL  --   --  0.27   ALK PHOS U/L  --   --  44   ALT U/L  --   --  7   AST U/L  --   --  10*   GLUCOSE RANDOM mg/dL 127   < > 103    < > = values in this interval not displayed.     Results from last 7 days   Lab Units 12/30/24  2050   INR  1.38*             Results from last 7 days   Lab Units 12/31/24  0553 12/30/24  1927   PROCALCITONIN ng/ml 0.06 0.07       Recent Cultures (last 7 days):   Results from last 7 days   Lab Units 12/30/24  2326   BLOOD CULTURE  No Growth at 24 hrs.  No Growth at 24 hrs.             Last 24 Hours Medication List:     Current Facility-Administered Medications:     acetaminophen (TYLENOL) tablet 650 mg, Q6H PRN    amLODIPine (NORVASC) tablet 5 mg, Daily    cyanocobalamin (VITAMIN B-12) tablet 1,000 mcg, Daily    dexamethasone (DECADRON) injection 4 mg, Q8H KRISS    [START ON 1/14/2025] dexamethasone (DECADRON) tablet 2 mg, QPM    [START ON 1/21/2025] dexamethasone (DECADRON) tablet 2 mg, Q12H KRISS    [START ON 1/7/2025] dexamethasone (DECADRON) tablet 4 mg, Q12H KRISS    [START ON 1/14/2025] dexamethasone (DECADRON) tablet 4 mg, QAM    folic acid (FOLVITE) tablet 1 mg, Daily    gabapentin (NEURONTIN) capsule 100 mg, BID **AND** gabapentin (NEURONTIN) capsule 300 mg, HS    levETIRAcetam (KEPPRA) tablet 1,000 mg, Q12H KRISS    levothyroxine tablet 50 mcg, Early Morning    ondansetron (ZOFRAN-ODT) dispersible tablet 4 mg, Q6H PRN    pantoprazole (PROTONIX) EC tablet 40 mg, Early Morning    rivaroxaban  (XARELTO) tablet 20 mg, Daily With Breakfast    senna (SENOKOT) tablet 8.6 mg, Daily    sulfamethoxazole-trimethoprim (BACTRIM DS) 800-160 mg per tablet 1 tablet, Once per day on Monday Wednesday Friday    Administrative Statements   Today, Patient Was Seen By: Karthikeyan Mock MD      **Please Note: This note may have been constructed using a voice recognition system.**

## 2025-01-01 NOTE — ASSESSMENT & PLAN NOTE
Diagnosed Feb/2024  Completed cycle 2 chemo on 11/14.   Was scheduled for cycle 3 on 12/5, however chemo has been on hold d/t acute illness and concern for toxicity.  MRI brain 12/29 revealed:  New enhancing lesion in the left frontal lobe, which is suspicious for metastasis.   Increased size of left occipital and left paramedian parietal lobe lesions without appreciable elevated perfusion.   Findings favor posttreatment sequelae/radiation effects though recommend continued attention on follow-up.   Of note, there is slightly   increased associated vasogenic edema in the left cerebral hemisphere.  The other lesions are stable to decreased/less conspicuous compared to the prior study.   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   Follows with Dr. Gamboa in the outpatient setting.  Per discussion with radiation oncology, there was no need for urgent radiation to the new lesion found in the left frontal lobe.  Currently immunotherapy is on hold until further evaluation as outpatient.    Plan:  Steroid taper as listed below.

## 2025-01-01 NOTE — PHYSICAL THERAPY NOTE
Physical Therapy Screen    Patient Name: Ayla Nye  Today's Date: 1/1/2025   Problem List  Principal Problem:    Lung cancer metastatic to brain (HCC)  Active Problems:    Hypertension    Hypothyroidism    Anemia    History of pulmonary embolism    Dyspnea on exertion    Recent PCP (pneumocystis jiroveci pneumonia)    Imbalance       01/01/25 1327   PT Last Visit   PT Visit Date 01/01/25   Note Type   Note type Screen   Additional Comments PT consult received and chart reviewed. Pt admitted w/ SOB and metastatic lung CA to brain. Spoke with OT Madiha whom reports that pt is independently mobilizing with no AD for long household distances, independently completing all self care, has daily HHA support at home. No skilled PT services indicated at this time as pt mobilizing independently. Please reconsult if pt's functional status significantly declines.       Rebeca Simpson, PT, DPT   Available via [employer]  NPI # [id number]  [place] License - [id number]  1/1/2025

## 2025-01-01 NOTE — ASSESSMENT & PLAN NOTE
Dx'd during Nov/Dec admission,   Completed course of atovaquone on 12/23/24  Started Bactrim DS 1 tablet M/W/F for PJP ppx

## 2025-01-01 NOTE — PLAN OF CARE
Problem: PAIN - ADULT  Goal: Verbalizes/displays adequate comfort level or baseline comfort level  Description: Interventions:  - Encourage patient to monitor pain and request assistance  - Assess pain using appropriate pain scale  - Administer analgesics based on type and severity of pain and evaluate response  - Implement non-pharmacological measures as appropriate and evaluate response  - Consider cultural and social influences on pain and pain management  - Notify physician/advanced practitioner if interventions unsuccessful or patient reports new pain  Outcome: Progressing     Problem: INFECTION - ADULT  Goal: Absence or prevention of progression during hospitalization  Description: INTERVENTIONS:  - Assess and monitor for signs and symptoms of infection  - Monitor lab/diagnostic results  - Monitor all insertion sites, i.e. indwelling lines, tubes, and drains  - Monitor endotracheal if appropriate and nasal secretions for changes in amount and color  - [place] appropriate cooling/warming therapies per order  - Administer medications as ordered  - Instruct and encourage patient and family to use good hand hygiene technique  - Identify and instruct in appropriate isolation precautions for identified infection/condition  Outcome: Progressing  Goal: Absence of fever/infection during neutropenic period  Description: INTERVENTIONS:  - Monitor WBC    Outcome: Progressing     Problem: SAFETY ADULT  Goal: Patient will remain free of falls  Description: INTERVENTIONS:  - Educate patient/family on patient safety including physical limitations  - Instruct patient to call for assistance with activity   - Consult OT/PT to assist with strengthening/mobility   - Keep Call bell within reach  - Keep bed low and locked with side rails adjusted as appropriate  - Keep care items and personal belongings within reach  - Initiate and maintain comfort rounds  - Make Fall Risk Sign visible to staff  - Offer Toileting every  Hours,  in advance of need  - Initiate/Maintain alarm  - Obtain necessary fall risk management equipment:   - Apply yellow socks and bracelet for high fall risk patients  - Consider moving patient to room near nurses station  Outcome: Progressing  Goal: Maintain or return to baseline ADL function  Description: INTERVENTIONS:  -  Assess patient's ability to carry out ADLs; assess patient's baseline for ADL function and identify physical deficits which impact ability to perform ADLs (bathing, care of mouth/teeth, toileting, grooming, dressing, etc.)  - Assess/evaluate cause of self-care deficits   - Assess range of motion  - Assess patient's mobility; develop plan if impaired  - Assess patient's need for assistive devices and provide as appropriate  - Encourage maximum independence but intervene and supervise when necessary  - Involve family in performance of ADLs  - Assess for home care needs following discharge   - Consider OT consult to assist with ADL evaluation and planning for discharge  - Provide patient education as appropriate  Outcome: Progressing  Goal: Maintains/Returns to pre admission functional level  Description: INTERVENTIONS:  - Perform AM-PAC 6 Click Basic Mobility/ Daily Activity assessment daily.  - Set and communicate daily mobility goal to care team and patient/family/caregiver.   - Collaborate with rehabilitation services on mobility goals if consulted  - Perform Range of Motion  times a day.  - Reposition patient every  hours.  - Dangle patient  times a day  - Stand patient  times a day  - Ambulate patient  times a day  - Out of bed to chair  times a day   - Out of bed for meals times a day  - Out of bed for toileting  - Record patient progress and toleration of activity level   Outcome: Progressing     Problem: DISCHARGE PLANNING  Goal: Discharge to home or other facility with appropriate resources  Description: INTERVENTIONS:  - Identify barriers to discharge w/patient and caregiver  - Arrange for  needed discharge resources and transportation as appropriate  - Identify discharge learning needs (meds, wound care, etc.)  - Arrange for interpretive services to assist at discharge as needed  - Refer to Case Management Department for coordinating discharge planning if the patient needs post-hospital services based on physician/advanced practitioner order or complex needs related to functional status, cognitive ability, or social support system  Outcome: Progressing     Problem: Knowledge Deficit  Goal: Patient/family/caregiver demonstrates understanding of disease process, treatment plan, medications, and discharge instructions  Description: Complete learning assessment and assess knowledge base.  Interventions:  - Provide teaching at level of understanding  - Provide teaching via preferred learning methods  Outcome: Progressing

## 2025-01-02 ENCOUNTER — RESULTS FOLLOW-UP (OUTPATIENT)
Dept: RADIATION ONCOLOGY | Facility: HOSPITAL | Age: 75
End: 2025-01-02

## 2025-01-02 VITALS
OXYGEN SATURATION: 95 % | BODY MASS INDEX: 21.53 KG/M2 | HEIGHT: 62 IN | HEART RATE: 70 BPM | SYSTOLIC BLOOD PRESSURE: 122 MMHG | DIASTOLIC BLOOD PRESSURE: 69 MMHG | TEMPERATURE: 98.2 F | RESPIRATION RATE: 18 BRPM | WEIGHT: 117 LBS

## 2025-01-02 PROBLEM — N28.89 RIGHT KIDNEY MASS: Status: ACTIVE | Noted: 2025-01-02

## 2025-01-02 PROCEDURE — 99239 HOSP IP/OBS DSCHRG MGMT >30: CPT | Performed by: INTERNAL MEDICINE

## 2025-01-02 PROCEDURE — 99222 1ST HOSP IP/OBS MODERATE 55: CPT

## 2025-01-02 RX ORDER — DEXAMETHASONE 4 MG/1
TABLET ORAL
Qty: 50 TABLET | Refills: 0 | Status: SHIPPED | OUTPATIENT
Start: 2025-01-02 | End: 2025-02-04

## 2025-01-02 RX ADMIN — DEXAMETHASONE SODIUM PHOSPHATE 4 MG: 4 INJECTION INTRA-ARTICULAR; INTRALESIONAL; INTRAMUSCULAR; INTRAVENOUS; SOFT TISSUE at 13:54

## 2025-01-02 RX ADMIN — CYANOCOBALAMIN TAB 500 MCG 1000 MCG: 500 TAB at 09:48

## 2025-01-02 RX ADMIN — GABAPENTIN 100 MG: 100 CAPSULE ORAL at 11:14

## 2025-01-02 RX ADMIN — PANTOPRAZOLE SODIUM 40 MG: 40 TABLET, DELAYED RELEASE ORAL at 06:25

## 2025-01-02 RX ADMIN — AMLODIPINE BESYLATE 5 MG: 5 TABLET ORAL at 09:48

## 2025-01-02 RX ADMIN — FOLIC ACID 1 MG: 1 TABLET ORAL at 09:48

## 2025-01-02 RX ADMIN — SENNOSIDES 8.6 MG: 8.6 TABLET, FILM COATED ORAL at 09:48

## 2025-01-02 RX ADMIN — LEVETIRACETAM 1000 MG: 250 TABLET, FILM COATED ORAL at 09:48

## 2025-01-02 RX ADMIN — DEXAMETHASONE SODIUM PHOSPHATE 4 MG: 4 INJECTION INTRA-ARTICULAR; INTRALESIONAL; INTRAMUSCULAR; INTRAVENOUS; SOFT TISSUE at 06:25

## 2025-01-02 RX ADMIN — RIVAROXABAN 20 MG: 20 TABLET, FILM COATED ORAL at 09:48

## 2025-01-02 RX ADMIN — LEVOTHYROXINE SODIUM 50 MCG: 50 TABLET ORAL at 06:25

## 2025-01-02 RX ADMIN — GABAPENTIN 100 MG: 100 CAPSULE ORAL at 06:24

## 2025-01-02 NOTE — RESULT ENCOUNTER NOTE
Results discussed with medical oncology and primary admitting team. Patient had f/u visit to discuss these findings today, though she had an unexpected admission.      and neurooncology nurse navigation aware to adjust follow-up pending discharge.    Honey Gamboa MD  Department of Radiation Oncology  [facility]

## 2025-01-02 NOTE — PLAN OF CARE
Problem: PAIN - ADULT  Goal: Verbalizes/displays adequate comfort level or baseline comfort level  Description: Interventions:  - Encourage patient to monitor pain and request assistance  - Assess pain using appropriate pain scale  - Administer analgesics based on type and severity of pain and evaluate response  - Implement non-pharmacological measures as appropriate and evaluate response  - Consider cultural and social influences on pain and pain management  - Notify physician/advanced practitioner if interventions unsuccessful or patient reports new pain  Outcome: Progressing     Problem: INFECTION - ADULT  Goal: Absence or prevention of progression during hospitalization  Description: INTERVENTIONS:  - Assess and monitor for signs and symptoms of infection  - Monitor lab/diagnostic results  - Monitor all insertion sites, i.e. indwelling lines, tubes, and drains  - Monitor endotracheal if appropriate and nasal secretions for changes in amount and color  - [place] appropriate cooling/warming therapies per order  - Administer medications as ordered  - Instruct and encourage patient and family to use good hand hygiene technique  - Identify and instruct in appropriate isolation precautions for identified infection/condition  Outcome: Progressing  Goal: Absence of fever/infection during neutropenic period  Description: INTERVENTIONS:  - Monitor WBC    Outcome: Progressing     Problem: SAFETY ADULT  Goal: Patient will remain free of falls  Description: INTERVENTIONS:  - Educate patient/family on patient safety including physical limitations  - Instruct patient to call for assistance with activity   - Consult OT/PT to assist with strengthening/mobility   - Keep Call bell within reach  - Keep bed low and locked with side rails adjusted as appropriate  - Keep care items and personal belongings within reach  - Initiate and maintain comfort rounds  - Make Fall Risk Sign visible to staff  - Offer Toileting every 2 Hours,  in advance of need  - Initiate/Maintain bed/chair alarm  - Obtain necessary fall risk management equipment  - Apply yellow socks and bracelet for high fall risk patients  - Consider moving patient to room near nurses station  Outcome: Progressing  Goal: Maintain or return to baseline ADL function  Description: INTERVENTIONS:  -  Assess patient's ability to carry out ADLs; assess patient's baseline for ADL function and identify physical deficits which impact ability to perform ADLs (bathing, care of mouth/teeth, toileting, grooming, dressing, etc.)  - Assess/evaluate cause of self-care deficits   - Assess range of motion  - Assess patient's mobility; develop plan if impaired  - Assess patient's need for assistive devices and provide as appropriate  - Encourage maximum independence but intervene and supervise when necessary  - Involve family in performance of ADLs  - Assess for home care needs following discharge   - Consider OT consult to assist with ADL evaluation and planning for discharge  - Provide patient education as appropriate  Outcome: Progressing  Goal: Maintains/Returns to pre admission functional level  Description: INTERVENTIONS:  - Perform AM-PAC 6 Click Basic Mobility/ Daily Activity assessment daily.  - Set and communicate daily mobility goal to care team and patient/family/caregiver.   - Collaborate with rehabilitation services on mobility goals if consulted  - Perform Range of Motion 3 times a day.  - Reposition patient every 2 hours.  - Dangle patient 3 times a day  - Stand patient 3 times a day  - Ambulate patient 3 times a day  - Out of bed to chair 3 times a day   - Out of bed for meals 3 times a day  - Out of bed for toileting  - Record patient progress and toleration of activity level   Outcome: Progressing     Problem: DISCHARGE PLANNING  Goal: Discharge to home or other facility with appropriate resources  Description: INTERVENTIONS:  - Identify barriers to discharge w/patient and  caregiver  - Arrange for needed discharge resources and transportation as appropriate  - Identify discharge learning needs (meds, wound care, etc.)  - Arrange for interpretive services to assist at discharge as needed  - Refer to Case Management Department for coordinating discharge planning if the patient needs post-hospital services based on physician/advanced practitioner order or complex needs related to functional status, cognitive ability, or social support system  Outcome: Progressing     Problem: Knowledge Deficit  Goal: Patient/family/caregiver demonstrates understanding of disease process, treatment plan, medications, and discharge instructions  Description: Complete learning assessment and assess knowledge base.  Interventions:  - Provide teaching at level of understanding  - Provide teaching via preferred learning methods  Outcome: Progressing

## 2025-01-02 NOTE — PLAN OF CARE
Problem: PAIN - ADULT  Goal: Verbalizes/displays adequate comfort level or baseline comfort level  Description: Interventions:  - Encourage patient to monitor pain and request assistance  - Assess pain using appropriate pain scale  - Administer analgesics based on type and severity of pain and evaluate response  - Implement non-pharmacological measures as appropriate and evaluate response  - Consider cultural and social influences on pain and pain management  - Notify physician/advanced practitioner if interventions unsuccessful or patient reports new pain  Outcome: Progressing     Problem: INFECTION - ADULT  Goal: Absence or prevention of progression during hospitalization  Description: INTERVENTIONS:  - Assess and monitor for signs and symptoms of infection  - Monitor lab/diagnostic results  - Monitor all insertion sites, i.e. indwelling lines, tubes, and drains  - Monitor endotracheal if appropriate and nasal secretions for changes in amount and color  - [place] appropriate cooling/warming therapies per order  - Administer medications as ordered  - Instruct and encourage patient and family to use good hand hygiene technique  - Identify and instruct in appropriate isolation precautions for identified infection/condition  Outcome: Progressing  Goal: Absence of fever/infection during neutropenic period  Description: INTERVENTIONS:  - Monitor WBC    Outcome: Progressing     Problem: SAFETY ADULT  Goal: Patient will remain free of falls  Description: INTERVENTIONS:  - Educate patient/family on patient safety including physical limitations  - Instruct patient to call for assistance with activity   - Consult OT/PT to assist with strengthening/mobility   - Keep Call bell within reach  - Keep bed low and locked with side rails adjusted as appropriate  - Keep care items and personal belongings within reach  - Initiate and maintain comfort rounds  - Make Fall Risk Sign visible to staff  - Offer Toileting every 2 Hours,  in advance of need  - Initiate/Maintain bed/chair alarm as needed  - Obtain necessary fall risk management equipment: socks  - Apply yellow socks and bracelet for high fall risk patients  - Consider moving patient to room near nurses station  Outcome: Progressing  Goal: Maintain or return to baseline ADL function  Description: INTERVENTIONS:  -  Assess patient's ability to carry out ADLs; assess patient's baseline for ADL function and identify physical deficits which impact ability to perform ADLs (bathing, care of mouth/teeth, toileting, grooming, dressing, etc.)  - Assess/evaluate cause of self-care deficits   - Assess range of motion  - Assess patient's mobility; develop plan if impaired  - Assess patient's need for assistive devices and provide as appropriate  - Encourage maximum independence but intervene and supervise when necessary  - Involve family in performance of ADLs  - Assess for home care needs following discharge   - Consider OT consult to assist with ADL evaluation and planning for discharge  - Provide patient education as appropriate  Outcome: Progressing  Goal: Maintains/Returns to pre admission functional level  Description: INTERVENTIONS:  - Perform AM-PAC 6 Click Basic Mobility/ Daily Activity assessment daily.  - Set and communicate daily mobility goal to care team and patient/family/caregiver.   - Collaborate with rehabilitation services on mobility goals if consulted  - Perform Range of Motion 3 times a day.  - Reposition patient every 2 hours.  - Dangle patient 3 times a day  - Stand patient 3 times a day  - Ambulate patient 3 times a day  - Out of bed to chair 3 times a day   - Out of bed for meals 3 times a day  - Out of bed for toileting  - Record patient progress and toleration of activity level   Outcome: Progressing     Problem: DISCHARGE PLANNING  Goal: Discharge to home or other facility with appropriate resources  Description: INTERVENTIONS:  - Identify barriers to discharge  w/patient and caregiver  - Arrange for needed discharge resources and transportation as appropriate  - Identify discharge learning needs (meds, wound care, etc.)  - Arrange for interpretive services to assist at discharge as needed  - Refer to Case Management Department for coordinating discharge planning if the patient needs post-hospital services based on physician/advanced practitioner order or complex needs related to functional status, cognitive ability, or social support system  Outcome: Progressing     Problem: Knowledge Deficit  Goal: Patient/family/caregiver demonstrates understanding of disease process, treatment plan, medications, and discharge instructions  Description: Complete learning assessment and assess knowledge base.  Interventions:  - Provide teaching at level of understanding  - Provide teaching via preferred learning methods  Outcome: Progressing

## 2025-01-02 NOTE — CONSULTS
Consultation - Palliative Care   Name: Ayla Nye 74 y.o. female I MRN: [id number]  Unit/Bed#: S -01 I Date of Admission: 12/30/2024   Date of Service: 1/2/2025 I Hospital Day: 1   Consults  Physician Requesting Evaluation: No att. providers found   Reason for Evaluation / Principal Problem:GOC    Assessment & Plan  Lung cancer metastatic to brain (HCC)  Follows with Dr. Castro- medical oncology  Completed cycle 2 chemo on 11/14. Was scheduled for cycle 3 on 12/5, however chemo has been on hold d/t acute illness and concern for toxicity   Palliative care patient  Goals of care  Level 3 code status  Disease focused care with limits  Will continue discussions regarding GOC as patient's clinical presentation evolves.  Encouraged follow up with Palliative Medicine on an outpatient basis after discharge for continued symptom management.  Our office will contact patient to schedule a hospital follow up.    Social support:  Patient is finding comfort in support of family  Supportive listening provided  Normalized experience of patient/family  Provided anxiety containment  Provided anticipatory guidance  Encouraged self care  Discussed spiritual needs  Living situation - lives alone    Follow up  Palliative Care will continue to follow and goals of care discussions will be ongoing.    Please reach out via [employer] Secure Chat if questions or concerns arise.    I have reviewed the patient's controlled substance dispensing history in the Prescription Drug Monitoring Program in compliance with the [place] regulations before prescribing any controlled substances.      Decisional apparatus:  Patient has capacity on exam today.  If capacity is lost, patient's substitute decision maker would default to daughter Yulia by [place] Act 169.  Advance Directive/Living Will, POLST and POA Forms: on file  ER contacts:  Name Relation Home Work Mobile   Juliana Camarena Daughter [phone]   Sue Antony Daughter  [phone]  [phone]   Elva Cardoza Sister In-Law   [phone]       We appreciate the invitation to be involved in this patient's care.  We will continue to follow throughout this hospitalization.  Please do not hesitate to reach our on call provider through our clinic answering service at [phone] should you have acute symptom control concerns.    Lisy JAIN, CRNP  Palliative and Supportive Care  Clinic/Answering Service: [phone]              PDMP Review: I have reviewed the patient's controlled substance dispensing history in the Prescription Drug Monitoring Program in compliance with the [facility] regulations before prescribing any controlled substances.    History of Present Illness   HPI: Ayla Nye is a 74 y.o. year old female who presents to the ED from her hematology oncology appointment for further evaluation of dyspnea on exertion.  Patient reports that her cough and shortness of breath had improved from her recent discharge until recently she had an acute increase in dyspnea starting approximately 2 days prior to her presentation to the ED.  She also endorses an increase in cough, nonproductive.  She also endorses some complaints of imbalance since tapering her prednisone from 20 mg to 10 mg approximately 5 days ago.  She has been on prednisone since SRT and has not been able to taper off due to recurrence of neurosymptoms.  Patient admitted for further management.    Patient is seen resting in bed no family present at the bedside. NAD.  Denies pain currently.  She reports that she is waiting to find out if she will be discharged home today.  Patient is well-known to the palliative care home team.  Discussed her new findings on her most recent MRI.  She states that she will schedule a follow-up appointment with her oncologist Dr. Castro and her radiation oncologist Dr. Gamboa.   Will schedule follow-up appointment with patient for outpatient palliative after patient is  discharged from the hospital to continue with ongoing goals of care conversation.    Review of Systems   All other systems reviewed and are negative.    I have reviewed the patient's PMH, PSH, Social History, Family History, Meds, and Allergies    Objective :  Temp:  [98.1 °F (36.7 °C)-98.2 °F (36.8 °C)] 98.2 °F (36.8 °C)  HR:  [63-70] 70  BP: (122-129)/(69) 122/69  Resp:  [18] 18  SpO2:  [93 %-95 %] 95 %  O2 Device: None (Room air)    Physical Exam  Vitals and nursing note reviewed.   Constitutional:       General: She is not in acute distress.  HENT:      Head: Normocephalic and atraumatic.   Eyes:      General:         Right eye: No discharge.         Left eye: No discharge.   Cardiovascular:      Rate and Rhythm: Normal rate.   Pulmonary:      Effort: Pulmonary effort is normal. No respiratory distress.   Musculoskeletal:         General: Normal range of motion.      Cervical back: Normal range of motion.   Skin:     General: Skin is warm and dry.      Coloration: Skin is not jaundiced.   Neurological:      General: No focal deficit present.      Mental Status: She is alert and oriented to person, place, and time. Mental status is at baseline.   Psychiatric:         Mood and Affect: Mood normal.         Behavior: Behavior normal.         Judgment: Judgment normal.            Lab Results: I have reviewed the following results:  Lab Results   Component Value Date/Time    SODIUM 138 01/01/2025 05:27 AM    K 4.6 01/01/2025 05:27 AM    BUN 17 01/01/2025 05:27 AM    CREATININE 0.99 01/01/2025 05:27 AM    GLUC 127 01/01/2025 05:27 AM    CALCIUM 9.4 01/01/2025 05:27 AM    AST 10 (L) 12/30/2024 02:42 PM    ALT 7 12/30/2024 02:42 PM    ALB 4.1 12/30/2024 02:42 PM    TP 7.0 12/30/2024 02:42 PM    EGFR 56 01/01/2025 05:27 AM     Lab Results   Component Value Date/Time    HGB 9.5 (L) 01/01/2025 05:27 AM    WBC 6.90 01/01/2025 05:27 AM     01/01/2025 05:27 AM    INR 1.38 (H) 12/30/2024 08:50 PM    PTT 41 (H) 12/30/2024  08:50 PM     Lab Results   Component Value Date/Time    LSH9HIUXNPAV 1.109 11/24/2024 07:30 PM       Imaging Results Review: I reviewed radiology reports from this admission including: chest xray, CT chest, and CT abdomen/pelvis.  Other Study Results Review: EKG was reviewed.       Administrative Statements   I have spent a total time of 34 minutes in caring for this patient on the day of the visit/encounter including Diagnostic results, Risks and benefits of tx options, Instructions for management, Patient and family education, Importance of tx compliance, Risk factor reductions, Counseling / Coordination of care, Documenting in the medical record, Reviewing / ordering tests, medicine, procedures  , and Obtaining or reviewing history  . Topics discussed with the patient / family include symptom assessment and management, medication review, psychosocial support, supportive listening, and anticipatory guidance.

## 2025-01-03 ENCOUNTER — TRANSITIONAL CARE MANAGEMENT (OUTPATIENT)
Age: 75
End: 2025-01-03

## 2025-01-03 ENCOUNTER — TELEPHONE (OUTPATIENT)
Age: 75
End: 2025-01-03

## 2025-01-03 NOTE — TELEPHONE ENCOUNTER
Patient called needing to make a hospital follow up for Dr. Castro. I gave her the first opening for [facility] of 1/27/25 and she is concerned it is too far away. Asked to speak with CTS in regards, attempted and no answer.         She also has some concerns with Dr. Gamboa as she is scheduled for a CT on 1/7/25 and unsure if she still needs to go through with the scan. Sh would like a call back to discuss

## 2025-01-03 NOTE — ASSESSMENT & PLAN NOTE
Goals of care  Level 3 code status  Disease focused care with limits  Will continue discussions regarding GOC as patient's clinical presentation evolves.  Encouraged follow up with Palliative Medicine on an outpatient basis after discharge for continued symptom management.  Our office will contact patient to schedule a hospital follow up.    Social support:  Patient is finding comfort in support of family  Supportive listening provided  Normalized experience of patient/family  Provided anxiety containment  Provided anticipatory guidance  Encouraged self care  Discussed spiritual needs  Living situation - lives alone    Follow up  Palliative Care will continue to follow and goals of care discussions will be ongoing.    Please reach out via [employer] Secure Chat if questions or concerns arise.    I have reviewed the patient's controlled substance dispensing history in the Prescription Drug Monitoring Program in compliance with the [facility] regulations before prescribing any controlled substances.      Decisional apparatus:  Patient has capacity on exam today.  If capacity is lost, patient's substitute decision maker would default to daughter Yulia by [place] 169.  Advance Directive/Living Will, POLST and POA Forms: on file  ER contacts:  Name Relation Home Work Mobile   Juliana Camarena Daughter [phone]   Sue Antony Daughter [phone]   Elva Cardoza Sister In-Law   [phone]       We appreciate the invitation to be involved in this patient's care.  We will continue to follow throughout this hospitalization.  Please do not hesitate to reach our on call provider through our clinic answering service at [phone] should you have acute symptom control concerns.    Lisy JAIN, CRNP  Palliative and Supportive Care  Clinic/Answering Service: [phone]

## 2025-01-03 NOTE — TELEPHONE ENCOUNTER
Called and spoke to Ayla. I offered her an appointment with Dr. Castro on 1/7 at 11:40 am. Ayla was agreeable to appointment. Ayla also asked that I cancel her CT chest ordered that day by Dr. Small since she had imaging done while admitted. I will cancel this for her and make Dr. Small' team aware.

## 2025-01-03 NOTE — ASSESSMENT & PLAN NOTE
Follows with Dr. Castro- medical oncology  Completed cycle 2 chemo on 11/14. Was scheduled for cycle 3 on 12/5, however chemo has been on hold d/t acute illness and concern for toxicity

## 2025-01-05 LAB
BACTERIA BLD CULT: NORMAL
BACTERIA BLD CULT: NORMAL

## 2025-01-06 PROCEDURE — 77263 THER RADIOLOGY TX PLNG CPLX: CPT | Performed by: INTERNAL MEDICINE

## 2025-01-06 NOTE — TELEPHONE ENCOUNTER
Thank you for letting me know. I reviewed the CT from 12/30/24 and she has follow up with Dr. Solo for Pulmonary on 1/29/25.     Eliseo Small M.D.  Pulmonary & Critical Care Fellow, PGY-IV

## 2025-01-07 ENCOUNTER — RA CDI HCC (OUTPATIENT)
Dept: OTHER | Facility: HOSPITAL | Age: 75
End: 2025-01-07

## 2025-01-07 ENCOUNTER — OFFICE VISIT (OUTPATIENT)
Dept: HEMATOLOGY ONCOLOGY | Facility: CLINIC | Age: 75
End: 2025-01-07
Payer: MEDICARE

## 2025-01-07 VITALS
WEIGHT: 114 LBS | BODY MASS INDEX: 20.98 KG/M2 | HEART RATE: 73 BPM | HEIGHT: 62 IN | SYSTOLIC BLOOD PRESSURE: 136 MMHG | RESPIRATION RATE: 18 BRPM | TEMPERATURE: 97.6 F | DIASTOLIC BLOOD PRESSURE: 88 MMHG | OXYGEN SATURATION: 96 %

## 2025-01-07 DIAGNOSIS — C79.31 MALIGNANT NEOPLASM METASTATIC TO BRAIN (HCC): ICD-10-CM

## 2025-01-07 DIAGNOSIS — C34.11 MALIGNANT NEOPLASM OF UPPER LOBE, RIGHT BRONCHUS OR LUNG (HCC): Primary | ICD-10-CM

## 2025-01-07 DIAGNOSIS — Z15.89 JAK2 V617F MUTATION: ICD-10-CM

## 2025-01-07 LAB
MYCOBACTERIUM SPEC CULT: NORMAL
RHODAMINE-AURAMINE STN SPEC: NORMAL

## 2025-01-07 PROCEDURE — 99215 OFFICE O/P EST HI 40 MIN: CPT | Performed by: INTERNAL MEDICINE

## 2025-01-07 NOTE — PROGRESS NOTES
HEMATOLOGY / ONCOLOGY CLINIC FOLLOW UP NOTE    Patient Ayla Nye  MRN: [id number]  : 1950  Date of Encounter 2025        Reason for Encounter: hospital follow up, pathology follow up for pelvic mass biopsy     C1 2024  C2  2024  C3 2024  C4 2024  C5 2024  C6 2024 at [place] f/b hospitalization for neutropenic fever     First line metastatic treatment     Carboplatin AUC 5 Pemetrexed 500 mg/m2 and Pembrolizumab 200 mg IV every 3 weeks x 4 cycles     C1 held due to hospital admission  C1 held due to radiation and concurrent high-dose steroid use     C1 now 10/7/2024  C2 10/28/2024-held due to admission     C2 2024- completed     No treatment since 2024 due to multiple admissions/toxicities, PCP pneumonia, PE on Xarelto            Admission -2024 PCP pneumonia, PE now on Xarelto                Oncology History Overview Note   history of [id number] (IIIB) NSCLC of the right upper lobe, s/p concurrent chemoRT completing on 24. She completed a course of SRS on 24 after MRI brain demonstrated five supra- and infratentorial enhancing lesions compatible with metastases. She also completed a course of palliative radiation to right gluteus on 10/18/24. She tolerated RT without much toxicity. She continued  to have right buttock pain. Has been prescribed increase in dex to 2mg BID, but not helpful. She was previously prescribed dilaudid but did not fill this medication. Discussed with palliative care who will call the patient to review non-opioid based alternatives including gabapentin. Pain control per palliative medicine.     She was admitted with seizure and neurologic symptoms prior to completion of her last fx of radiation therapy.  Patient received 14/15 fx to right gluteus. Due to prolonged hospitalization (10/16/24-current), patient had a break in treatment. Call placed to patient regarding last treatment, patient stated she was ok with  ending after 14 treatments. She returns today for first routine follow-up.       11/5/24 Dr. Castro  On treatment with Carboplatin AUC 5 Pemetrexed 500 mg/m2 and Pembrolizumab 200 mg IV every 3 weeks x 4 cycles   C1 held due to hospital admission  C1 held due to radiation and concurrent high-dose steroid use   C1 now 10/7/2024  C2 10/28/2024-held due to admission   C2 now 11/14/2024 11/13/24 Palliative care  Current regimen: Gabapentin 100mg AM, 100mg  Noon, 300mg Bedtime  Tylenol 650mg TID  Heating pad during the day  Pain is much better controlled.   Follow-up in 4 weeks.      11/24 - 12/6/24 Hospital admission  Presented with fatigue SOB, and home temp of 101.3.   Imaging showed b/l PE and RUL pneumonia      12/10/24 Dr. Castro  Admission 11/24-12/6/2024 PCP pneumonia, PE now on Xarelto    Atovaquone was started for 21 days, to complete 12/23/2024   Will then start Bactrim DS M/W/F   Again remains on prednisone, on current taper -60 mg bid x 5 days, then 40 mg x 5 days then 20 mg x 5 days then 10 mg daily-will be at 10 mg daily by 21 Dec 2024   Doing better, less SOB   Will hold any treatment for now-cannot get IO therapy and unclear that she is a good candidate based on chronic immunosuppression from steroids   Will consider Carboplatin/Alimta later this month        12/27/24 MRI brain BT  IMPRESSION:  1. New enhancing lesion in the left frontal lobe, which is suspicious for metastasis.  2. Increased size of left occipital and left paramedian parietal lobe lesions without appreciable elevated perfusion. Findings favor posttreatment sequelae/radiation effects though recommend continued attention on follow-up. Of note, there is slightly   increased associated vasogenic edema in the left cerebral hemisphere, as described above.  3. The other lesions are stable to decreased/less conspicuous compared to the prior study.  4. Slightly decreased conspicuity of enhancement involving the bilateral cranial nerves  VII and IACs, which remains suspicious for leptomeningeal metastatic disease.    12/30/24 CTA chest pe study  IMPRESSION:  No new pulmonary embolism. Resolution of previously seen right-sided pulmonary embolism. Near complete resolution of previously seen left lower lobe pulmonary embolism with questionable minimal residual linear thrombus in this region.  Spiculated right upper lobe nodule measures slightly larger than on prior exam. Increase in right lung groundglass opacities which may represent worsening pneumonia and/or malignancy.  Incompletely evaluated right upper pole lesion appears larger than on prior exam, suspicious for metastasis. Dedicated abdominal imaging is recommended.    12/30/24 Dr. Castro  No treatment since 11/14/2024 due to multiple admissions/toxicities, PCP pneumonia, PE on Xarelto   reated for PCP pneumonia with Atovaquone completed 12/23/2024 now on Bactrim DS MW F  Able to walk only few steps and has to rest  Prednisone tapered to 10 mg daily and now again more issues with balance  Would rule out adrenal insuffciency due to long term steroids/tapering/effects  May need to increase back to 20 mg daily prednisone vs hydrocortisone  Assess for dehydration as well  Will be sent to ER for further evaluation     12/30/24 Presented to ED at the recommendation of Dr. Castro.     Upcoming appts:  1/7/25 CT chest wo contrast  1/8/25 Palliative care  1/15/25 Infusion   1/29/25 Pulmonary       Malignant neoplasm of upper lobe, right bronchus or lung (HCC)   2024 Initial Diagnosis    Primary lung adenocarcinoma, right (HCC)     3/26/2024 Biopsy    A-C. Lymph Node, Level 4R :    - Metastatic non-small cell carcinoma, most compatible with a primary lung adenocarcinoma; see note.       D-F. Lymph Node, Level 10R:    - Metastatic non-small cell carcinoma; see note.       G-I. Lymph Node, Level 4L:    - Metastatic non-small cell carcinoma; see note.       4/15/2024 -  Cancer Staged    Staging form:  Lung, AJCC 8th Edition  - Clinical stage from 4/15/2024: Stage IIIB (cT2b, cN3, cM0) - Signed by Rogelio Beebe DO on 4/15/2024       5/23/2024 - 7/2/2024 Chemotherapy    alteplase (CATHFLO), 2 mg, Intracatheter, Every 1 Minute as needed, 6 of 6 cycles  CARBOplatin (PARAPLATIN) IVPB (GOG AUC DOSING), 130.4 mg, Intravenous, Once, 6 of 6 cycles  Administration: 130.4 mg (5/23/2024), 144.8 mg (5/30/2024), 138.4 mg (6/6/2024), 144.8 mg (6/13/2024), 132 mg (6/21/2024), 143.6 mg (7/2/2024)  PACLItaxel (TAXOL) chemo IVPB, 45 mg/m2 = 67.2 mg (90 % of original dose 50 mg/m2), Intravenous, Once, 6 of 6 cycles  Dose modification: 45 mg/m2 (original dose 50 mg/m2, Cycle 1, Reason: Anticipated Tolerance)  Administration: 67.2 mg (5/23/2024), 67.2 mg (5/30/2024), 67.2 mg (6/6/2024), 67.2 mg (6/13/2024), 67.2 mg (6/21/2024), 67.2 mg (7/2/2024)     5/23/2024 - 7/5/2024 Radiation    Treatment:  Course: C1    Plan ID Energy Fractions Dose per Fraction (cGy) Dose Correction (cGy) Total Dose Delivered (cGy) Elapsed Days   R Lung_Hilum 6X 30 / 30 200 0 6,000 43      Treatment dates:  C1: 5/23/2024 - 7/5/2024 8/8/2024 - 8/8/2024 Radiation    SRS 5 PTVs   2000 cGy     9/12/2024 Biopsy    Mass, Superficial perianal mass:  - Squamous cell carcinoma.     Note: The patient's prior lung EBUS sampling shows the tumor to stain for TTF1 and Napsin with absent p40 expression.  The current perianal mass sampling shows diffuse p40 expression with absent TTF1 expression.  This may represent a primary anal/perianal squamous cell carcinoma versus a possible metastatic combined lung tumor (adenocarcinoma and previously unsampled squamous component).  Suggest clinical correlation and appropriate follow-up.         9/23/2024 -  Cancer Staged    Staging form: Lung, AJCC 8th Edition  - Clinical: Stage IV (cM1) - Signed by Honey Gamboa MD on 9/23/2024       10/1/2024 - 10/18/2024 Radiation    Course: C3    Plan ID Energy Fractions Dose per  Fraction (cGy) Dose Correction (cGy) Total Dose Delivered (cGy) Elapsed Days   R Gluteus:1 10X/6X 14 / 15 300 0 4,200 17      Treatment Dates:  10/1/2024 - 10/18/2024.       10/7/2024 -  Chemotherapy    cyanocobalamin, 1,000 mcg, Intramuscular, Once, 1 of 3 cycles  Administration: 1,000 mcg (8/19/2024)  alteplase (CATHFLO), 2 mg, Intracatheter, Every 1 Minute as needed, 2 of 6 cycles  fosaprepitant (EMEND) IVPB, 150 mg, Intravenous, Once, 2 of 6 cycles  Administration: 150 mg (10/7/2024), 150 mg (11/14/2024)  CARBOplatin (PARAPLATIN) IVPB ([facility] AUC DOSING), 347 mg, Intravenous, Once, 2 of 6 cycles  Administration: 347 mg (10/7/2024), 346 mg (11/14/2024)  pemetrexed (ALIMTA) chemo infusion, 735 mg, Intravenous, Once, 2 of 6 cycles  Administration: 700 mg (10/7/2024), 700 mg (11/14/2024)  pembrolizumab (KEYTRUDA) IVPB, 200 mg, Intravenous, Once, 2 of 6 cycles  Administration: 200 mg (11/14/2024), 200 mg (10/7/2024)     Metastatic cancer to brain (HCC)   7/29/2024 Initial Diagnosis    Metastatic cancer to brain (HCC)     8/8/2024 - 8/8/2024 Radiation    SRS 5 PTVs   2000 cGy     9/12/2024 Biopsy    Mass, Superficial perianal mass:  - Squamous cell carcinoma.     Note: The patient's prior lung EBUS sampling shows the tumor to stain for TTF1 and Napsin with absent p40 expression.  The current perianal mass sampling shows diffuse p40 expression with absent TTF1 expression.  This may represent a primary anal/perianal squamous cell carcinoma versus a possible metastatic combined lung tumor (adenocarcinoma and previously unsampled squamous component).  Suggest clinical correlation and appropriate follow-up.         10/1/2024 - 10/18/2024 Radiation    Course: C3    Plan ID Energy Fractions Dose per Fraction (cGy) Dose Correction (cGy) Total Dose Delivered (cGy) Elapsed Days   R Gluteus:1 10X/6X 14 / 15 300 0 4,200 17      Treatment Dates:  10/1/2024 - 10/18/2024.              Assessment/Plan:     Ms. 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  She has been tolerating Hydrea 500 mg once daily Monday through Friday and off on Saturday and Sunday. Patient also with 4.1 cm RUL mass 1.5cm spiculated posterior nodule mediastinal adenopathy, no metastatic disease including brain MRI new diagnosis      ET:      Previously treated with hydroxyurea (HYDREA) 500 mg capsule     Plts 498 > 434 > 388 (holding hydroxyurea since 5/23/2024 2/2 drop in platelets from chemotherapy)     Plts 166 (7/4/2024) s/p C6 chemo, continuing to hold hydroxyurea, recheck labs and will plan to restart once labs show thrombocytosis.     Does not need to be restarted on hydrea; labs from 8/1/2024 were 192      Plts 8/24/2024 were 227      NSCLC/adenocarcinoma; mutational analysis pending      Now with RUL 4.2 cm mass with mediastinal adenopathy at least cT2a cN2 cM0 lung cancer     cT2b N3 Stage IIIB lung      Please see above regarding discussion      Carboplatin AUC 2 Taxol 45 mg/m2 weekly with RT     Durvalumab adjuvant post treatment depending on response     Neuropathy has resolved after last cycle of Taxol.     Esophagitis appearing better after completing radiation.     7/1/2024     Was admitted 6/22-6/24/2024 for neutropenic fever     WBC 2.5 ANC 1810 Hgb 9.0 plts 240 (hx ET/off hydrea)     Continue with C6 7/2/2024; last radiation 7/5/2024       7/15/2024     Completed C6 on 7/2/2024 and last radiation 7/5/2024.  Was admitted for neutropenic fever after last cycle of chemo.  Discharged on 7/4/2024 and has been asymptomatic since.  Labs at discharge showed WBC 1.5, ANC 1317 and Plt 166.     Repeat CT PET 8/26/2024, radiology read from CT CAP in ED showed \"progression\" of disease but this study was performed during chemoradiation, so it is confounded by the inability to distinguish disease from inflammation.  Will  treatment response on repeat CT PET.     Recheck labs to confirm " neutropenia has resolved.     Hold Otezla for psoriasis until neutropenia has resolved, but would like to restart as soon as safe as psoriasis is rebounding.     8/16/2024     Was admitted for new onset brain mets 7/30/2024; on decadron taper down to 1 mg daily next week     S/p SRS 6 fractions completed 8 August 2024 at [place]     Had modest response to treatment per PET in terms of the lung     At issue clearly are the new brain lesions 2.0x.4 left occiput as well as multiple other smaller lesions as below     In addition PET with new SUV 9.9 mass in the right anorectal fat/right ischial tuberosity 2.7x2 which is painful to patient and feels is growing     Will get MRI pelvis; will probably need biopsy.  Less likely abscess and more likely disease but unusual place for a NSCLC to metastasize to     Will start Carboplatin/Pemetrexed/Pembrolizumab and had long discussion regarding this         9/17/2024     Biopsy periranal area as follows:         . Mass, Superficial perianal mass:  - Squamous cell carcinoma.     Note: The patient's prior lung EBUS sampling shows the tumor to stain for TTF1 and Napsin with absent p40 expression.  The current perianal mass sampling shows diffuse p40 expression with absent TTF1 expression.  This may represent a primary anal/perianal squamous cell carcinoma versus a possible metastatic combined lung tumor (adenocarcinoma and previously unsampled squamous component).  Suggest clinical correlation and appropriate follow-up.      Immunohistochemistry was performed on block A3. The tumor cells are positive for p40, AE1/3, CK7, GATA3; MOC-13 shows nonspecific staining and negative for CDX2, p16, CD45, CD31, synaptophysin, chromogranin, mucicarmine, p53 (wild-type), supporting the above diagnosis.  Napsin A is pending and results will be reported in an addendum.      Will have Rad Onc see patient as pain is unmanageable and will not use narcotics     Is seeing Palliative Care     Will add  celebrex 200 mg bid for now as taking extensive Tylenol with no pain relief     Was taking oxycodone, did not help and does not want dilaudid as lives alone      In terms of her metastatic disease, she was being treated for adenocarcinoma and this pathology is SCC; NSCLC is adenocarcinoma and thus this may be new primary lesion     May consider Carboplatin with taxane     Cannot start IO therapy as decadron dose remains above 20 mg prednisone equivalent and attempts at tapering have led to worsening neurological symptoms        9/23/2024     Pain improved with Celebrex/Tylenol      Did see Rad Onc; planning 15 fractions to right gluteal area with path c/w SCC     Markers for lung adenocarcinoma as was [facility]     This occurred also on Carboplatin/Taxol and CRT     Thus, will treat as planned with Carboplatin/Pemetrexed/Pembrolizumab     Decadron 2 mg bid; plan to 2 mg daily next week.     Can remain on that as equivalent to 10 mg prednisone     Plan for chemo at [place] 7th Oct 2024      10/9/2024     Continues with rectal pain; was started on RT for SCC/new primary in right muscle-has had 6/15 fractions     Was on decadron taper to 2 mg daily which allows IO therapy to work as not effective at doses above 10 mg prednisone/2 mg decadron ; Rad Onc increased to 4 mg decadron for pain control but probably compromised efficacy of Pembrolizumab      Seeing Dr Neff tomorrow      Did tolerate C1 of Carboplatin/Pemetrexed/Pembrolizumab with no side effects        11/5/2024     Again admitted for HA/dizziness off decadron; discharged 11/4/2024; also found to be COVID positive, on paxlovid at present      Restarted 2 mg daily decadron as cannot tolerate taper     EEG done/ questionable seizure activity, on keppra     Scans repeated and with only 1 cycle of chemo which is not telling results as follows:     CT CAP 10/22/2024    1. New small bilateral pulmonary nodules, likely metastases.     2. New right renal lesion(s)  concerning for metastasis.     3. Significant interval enlargement of necrotic appearing mass in the right ischiorectal fossa, likely metastasis.     4. Decreasing right upper lobe necrotic mass with stable and/or decreasing satellite nodularity.     5. Improved mediastinal and right hilar adenopathy.     6. Stable 2.4 cm left upper lobe groundglass density.      MRI brain 10/21/2024       Redemonstration of intracranial metastatic lesions  2.3 cm enhancing lesion at the left occipital lobe is slightly increased in maximum dimension with increased central necrosis at the anterior margin that may be partly due to radiation necrosis.  Additional smaller metastases are stable or mildly decreased in size.  Stable left parieto-occipital edema with mass effect on the left lateral ventricle.  No new metastases. No acute infarct or hemorrhage     Will remain on decadron and will attempt C2 now on 11/14/2024     12/10/2024     Again admaitted 11/24-12/6/2024 with SOB/fevers.  Found to have PCP pneumonia     Atovaquone was started for 21 days, to complete 12/23/2024     Will then start Bactrim DS M/W/F     Again remains on prednisone, on current taper -60 mg bid x 5 days, then 40 mg x 5 days then 20 mg x 5 days then 10 mg daily-will be at 10 mg daily by 21 Dec 2024     On Xarelto for PE      Doing better, less SOB     No CNS symptoms at present     Will hold any treatment for now-cannot get IO therapy and unclear that she is a good candidate based on chronic immunosuppression from steroids     Will consider Carboplatin/Alimta later this month     12/30/2024    Worsening SOB/cough over past few days    RA pulse ox 93% decreased from 99% last visit    Treated for PCP pneumonia with Atovaquone completed 12/23/2024 now on Bactrim DS MW F    Able to walk only few steps and has to rest    Prednisone tapered to 10 mg daily and now again more issues with balance    Would rule out adrenal insuffciency due to long term  steroids/tapering/effects    May need to increase back to 20 mg daily prednisone vs hydrocortisone    /72    Assess for dehydration as well    Will be sent to ER for further evaluation        1/7/2025    SOB is improved but not back to baseline, on Decadron 4 BID, having insomnia, suspect pneumonitis was due to pembrolizumab, continue Decadron 4 BID for now with plans for eventual taper to no less than 2 BID    Worsening cerebral edema and memory loss, steroids as above, 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 Gamboa on 1/9), if she pursues whole brain then we will hold chemotherapy, if not, we will see her in one week to consider systemic therapy, she is not currently and likely will not be a candidate for immunotherapy given her high-dose steroids currently and her failed immunotherapy challenge on maintenance-dose steroids    Worsening rectal pain, recommended follow up discussion with radiation oncology to consider whether she is a candidate for repeat radiation    Office visit in one week        ET     Plts off hydrea 220      Plts 102 11/4/2024     Plts 371 as of 12/6/2024     Plts 246 12/23/2024       Follow up        Depending on whether admission today       History of present illness:  Initial Visit 4/10/2024     Ayla Nye is a 73-year-old female with past medical history of hypertension, psoriatic arthritis, CKD stage III, hyperlipidemia, and latent TB.  She is seen in follow-up for essential thrombocytosis.  She has had an elevated platelet count dating back to January 2028 all of her other cell lines were within normal limits.  Her highest platelet count was around 700,000.  Myeloproliferative work-up demonstrated positive JAK2 mutation and she was started on 500 mg of Hydrea daily in July 2020.  Due to leukopenia her dose was reduced to Monday, Wednesday, Friday.  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  Then again in July we had to decrease her dose and she is taking 500 mg once daily Monday through Friday and not taking any on Saturday or Sunday.     She has been tolerating the 500 mg of Hydrea Monday through Friday off Saturday and Sunday well without any side effects.  She was seen by my attending and underwent further workup of her recurrent infections and was found to have a mildly low IgG level.   She has had pneumonia twice once in October and then again in February.  She states that she has had multiple imaging of her chest which does suggest scarring which prompted a CT scan of her chest .  .  She is also had recurrent sinus infections.  She is a former smoker and quit 15 years ago bit was 1[[d x more than 30 years.        She does not have any continued symptoms of pneumonia and no cough, shortness of breath, or fevers.       She underwent the following testing; EBUS with pulmonary as well as CT chest/PET scan     Lung mass on CT chest 2/28/2024     LUNGS:     -4.1 x 3.4 cm solid mass in the anterior right upper lobe (series 302, image 73).     -Posterior to this mass, there is a 1.5 x 1.5 cm spiculated nodule (series 302, image 78)     -2.5 x 2.1 cm groundglass nodule in the anterior left upper lobe (series 302, image 90)     Mild emphysema.     Right apical pleural-parenchymal scarring.     PLEURA: Small calcified left posterior pleural plaque, which may be from prior asbestos exposure or the sequela of old inflammation.     HEART/GREAT VESSELS: Normal heart size. Mild-moderate coronary artery calcification. Mild to moderate mitral annular calcification. Trace pericardial effusion. No thoracic aortic aneurysm.     MEDIASTINUM AND SUDEEP: 1.6 x 2.0 cm right lower paratracheal/precarinal lymph node.  1.3 x 1.3 cm right lower paratracheal lymph node.        CT PET  3/28/2024     Intensely FDG avid right upper lobe mass, SUV max of 18. This abuts the anterior pleural margin. Central photopenia suggest necrosis. Mass measures on the order of 4.2 cm in size, stable previously 4.1 cm.     Subjacent 1.5 cm spiculated nodule posteriorly demonstrates minimal uptake, SUV max of 1.7.     Minimal FDG uptake in the left upper lobe groundglass nodule, SUV max of 2.2. This measured up to 2.5 cm in size on recent CT, appears grossly stable on low-dose CT.     A few FDG-avid mediastinal lymph nodes. Right anterior paratracheal lymph node demonstrates SUV max of 13. This measures up to 1.9 cm short axis image 85 series 3, may be slightly larger previously 1.6 cm.     A right perihilar lymph node demonstrates SUV max of 12. This measures on the order of 1.2 cm short axis image 89 series 3.  CT images: Scattered emphysematous changes of the lung fields. Mild to moderate coronary artery calcifications. Minimal left pleural calcification. Small calcified lymph nodes in the left perihilar region        3/26/2024     -C. Lymph Node, Level 4R (ThinPrep, smears and cell block preparations):    - Metastatic non-small cell carcinoma, most compatible with a primary lung adenocarcinoma; see note.    - Satisfactory for evaluation.     D-F. Lymph Node, Level 10R (ThinPrep, smears and cell block preparations):    - Metastatic non-small cell carcinoma; see note.    - Satisfactory for evaluation.     G-I. Lymph Node, Level 4L (ThinPrep, smears and cell block preparations):    - Metastatic non-small cell carcinoma; see note.    - Satisfactory for evaluation.      7/3/2024    CT CHEST, ABDOMEN AND PELVIS WITH IV CONTRAST     INDICATION: hx of lung cancer, fever, cough. Fever (102.9 degrees Fahrenheit), cough, recently had chemotherapy.     COMPARISON: February 28, 2024     TECHNIQUE: CT examination of the chest, abdomen and pelvis was performed.  Multiplanar 2D reformatted images were created from the source data.     This examination, like all CT scans performed in the [facility], was performed utilizing techniques to minimize radiation dose exposure, including the use of iterative reconstruction and automated exposure control. Radiation dose length   product (DLP) for this visit: 314 mGy-cm     IV Contrast: 100 mL of iohexol (OMNIPAQUE)  Enteric Contrast: Not administered.     FINDINGS:     CHEST     LUNGS: There is an approximately 4.6 x 4.8 x 3.3 cm mass within the anteromedial aspect of the right upper lobe of the lung. This mass abuts the anterior and anteromedial pleural surface and the right hand aspect of the upper mediastinum. The mass   demonstrates peripheral spiculation and areas of low density centrally, suggesting areas of necrosis. Just posterolateral to this mass there is a spiculated satellite lesion measuring approximately 1.6 cm. There is also a spiculated satellite lesion   anterior and inferior to the dominant mass, measuring approximately 1.4 cm and abutting the anterior pleural surface (series 302 image 91); this satellite mass appears new compared with February 28, 2024. An ill-defined groundglass opacity in the left   upper lobe of the lung measures approximately 2.5 cm, similar to the prior study.     There is mild to moderate emphysema. There is mild bibasilar scarring versus atelectasis. Right apical scarring unchanged.     PLEURA: Left pleural calcifications unchanged. No pleural effusions. No pneumothorax.     HEART/GREAT VESSELS: Coronary artery disease. There is atherosclerosis of the thoracic aorta. No thoracic aortic aneurysm.     MEDIASTINUM AND SUDEEP: There is a 2.4 x 2.2 cm right paratracheal node demonstrating low density centrally suggesting necrosis; this is larger compared with February 28, 2024. Additionally, there is a 2.8 x 2 cm precarinal node demonstrating low density   centrally suggesting  necrosis, also larger compared to the prior study. Necrotic appearing right hilar adenopathy measuring up to 2 cm.     CHEST WALL AND LOWER NECK: Tiny thyroid nodules, measuring 6 mm or less. Incidental discovery of one or more thyroid nodule(s) measuring less than 1.5 cm and without suspicious features is noted in this patient who is above 35 years old; according to   guidelines published in the February 2015 white paper on incidental thyroid nodules in the Journal of the American College of Radiology (JACR), no further evaluation is recommended.   Interval History: 9/17/2024     Patient is here for treatment and hospital follow up.     Admitted 9/9/24 to 9/12/24 for AMS found to have MRI Brain showing increased cerebral vasogenic edema after recently steroid taper (not stopped), c/f leptomeningeal mets, evidence of new tiny temporal lobe met, evidence of unchanged parietal and occipital mets, and evidence of decreased frontal lobe and cerebellar mets.  She was also noted to have a new mass of her deep R gluteal fossa.  Her mental status rapidly improved with steroids.  Patient underwent LP and biopsy of her R gluteal mass.  Initial LP studies showed 0 WBCs, 0 RBCs, normal glucose, slightly elevated protein at 73, and Gram stain and cultures were negative ruling out meningitis and ICH.  No evidence thus far of leptomeningeal metastasis on LP.  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     She was placed on a steroid taper at discharge on 9/12 as follows:       -dexamethasone 4 BID x4 days f/b       -dexamethasone 4 AM and 2 PM x7 days f/b       -dexamethasone 2 BID x7 days f/b       -dexamethasone 2 daily with further taper per heme/onc     Her levothyroxine was discontinued and will be followed with serial blood draws.     Labs from the day of her discharge (9/13/24) showed folate >22, B12 1268, , WBC 8.5, Hb 10.5, Plt 264, TSH 0.403, FT40.92, and FT3 2.33.      Patient endorsed acute on chronic anal pain that has worsened since her anal mass biopsy.  The pain is sharp and severe and occasionally radiates down her leg.  It is best in the morning after taking her steroids.  She was prescribed oxycodone 5 mg Q4H (36 MME) at hospital discharge which has not helped her pain.  She spoke with palliative over the phone yesterday and has an appointment with them tomorrow.  They recommended changing from oxycodeone to Dilaudid 2 mg PO Q4H (60 MME) and they gave her Narcan.  She is very concerned about taking opioids as she lives alone and would not have anyone to give her Narcan.     She is amanable to radiation therapy and would like to start as soon as possible.         Interval History:   11/5/2024     Admission 10/16/2024-11/4/2024     Again, admitted dizziness/headaches; had been weaned off decadron.  No new brain lesions on MRI; restarted decadron currently again at 2 mg and will remain at this dose.  Synthroid iremains at 50 mcg daily Is very fatigued.  Has COVID diagnosed two days ago on paxlovid.  Has no symptoms at present.  Anxious to restart chemotherapy. Is being followed by Palliative .  Weight 116 pounds.  Facial edema, no SOB/CAIN. Fatigued Continues to have significant anal pain, using NSAIDS.  Radiation was not effective            MRI 10/21/2024       Redemonstration of intracranial metastatic lesions  2.3 cm enhancing lesion at the left occipital lobe is slightly increased in maximum dimension with increased central necrosis at the anterior margin that may be partly due to radiation necrosis.  Additional smaller metastases are stable or mildly decreased in size.  Stable left parieto-occipital edema with mass effect on the left lateral ventricle.  No new metastases. No acute infarct or hemorrhage        CT CAP 10/22/2024        CHEST     LUNGS: 2.5 x 1.8 x 1.9 cm irregular, necrotic right upper lobe medial juxtapleural mass (6/38 and 601/43) has decreased in size  from previous study, previously 4.6 x 4.8 x 3.3 cm by report. A 7 x 5 mm spiculated nodule posterior to the dominant mass is   again noted which appears similar (6/41). Another previously described satellite nodule anterior and inferior to the dominant mass in the right lobe previously measuring up to 1.4 cm currently measures about 0.8 x 0.4 cm (6/50. There is some linear   scarring or atelectasis also noted anteriorly and medially from the dominant mass toward the pleura.     New 8 mm right lower lobe nodule (6/94).  New 5 mm left lower lobe nodule (6/93).     Stable 2.4 cm groundglass opacity anterior left upper lobe (6/43).     Mild background emphysema. Mild scattered linear atelectasis or scarring in the lungs.     AIRWAYS: No significant filling defect.     PLEURA: No pleural effusion. Stable right apical pleural-parenchymal scarring. Pleural calcification posterior left lower lobe again noted.     HEART/GREAT VESSELS: Heart is unremarkable for patient's age. Atherosclerotic changes thoracic aorta and coronary arteries. No thoracic aortic aneurysm.     MEDIASTINUM AND SUDEEP:  -Right paratracheal lymph node measuring 1.0 x 0.7 cm (2/44), previously 2.4 x 2.2 cm.     -1.2 x 0.8 cm precarinal lymph node (2/50), significantly decreased from previous diagnostic study in July 2024, difficult to directly compare to previous size on noncontrast PET CT but also possibly slightly smaller. Some FDG avidity was present in this   region on the prior PET study.     -Right hilar lymph node seen on the prior diagnostic CT measures about 6 mm short axis (2/56), previously about 1.7 cm when measured in a similar fashion and is of lower density currently.     -4 mm short axis subcarinal lymph node, previously measured about 9 mm.     The esophagus is unremarkable.     CHEST WALL AND LOWER NECK: Subcentimeter bilateral thyroid nodules. Incidental discovery of one or more thyroid nodule(s) measuring less than 1.5 cm and without  suspicious features is noted in this patient who is above 35 years old; according to   guidelines published in the February 2015 white paper on incidental thyroid nodules in the Journal of the American College of Radiology (JACR), no further evaluation is recommended.     ABDOMEN     LIVER/BILIARY TREE: Mild hepatomegaly. Tiny hypodensity in the left hepatic lobe (2/107), stable in retrospect. Mild prominence of the common hepatic duct with tapering of the distal CBD, may be age-related.     GALLBLADDER: No calcified gallstones. No pericholecystic inflammatory change.     SPLEEN: Unremarkable.     PANCREAS: Unremarkable.     ADRENAL GLANDS: Unremarkable.     KIDNEYS/URETERS: There is an ill-defined 2.2 x 1.8 cm hypodensity within the medial upper pole right kidney which is new from the previous CT study and not clearly detectable on prior PET/CT either. There is no significant perinephric fat stranding or   elevated white blood cell count to suggest pyelonephritis, therefore this is suspicious for metastasis. There is also a 6 mm hypodensity in the posterior lower pole right kidney, barely if at all perceptible on the prior contrast-enhanced CT study.   Although this could correspond to enlargement of a complex subcentimeter cyst (as there are several additional small cysts in the right kidney), additional metastasis is not excluded.  Tiny nonobstructing calculi right kidney measuring up to 2 mm.     Several subcentimeter left renal hypodensities are too small to characterize, unchanged from prior study. No hydronephrosis on either side.     STOMACH AND BOWEL: Colonic diverticulosis without findings of acute diverticulitis.     APPENDIX: No findings to suggest appendicitis.     ABDOMINOPELVIC CAVITY: No diffuse ascites. Trace presacral edema which is new. No pneumoperitoneum. No lymphadenopathy.     VESSELS: Atherosclerotic changes abdominal aorta without evidence of aneurysm.     PELVIS     REPRODUCTIVE ORGANS:  Unremarkable for patient's age.     URINARY BLADDER: Tiny focus of gas within the urinary bladder, presumably related to recent catheterization. Correlate clinically.     ABDOMINAL WALL/INGUINAL REGIONS: Enlarging 6.3 x 4.0 x 5.0 cm irregular rim-enhancing mass with central low attenuation in the right ischiorectal fossa abutting the anteromedial margin of the right gluteus britt muscle, the adjacent obturator internus   as well as the right levator ani muscle but without discrete muscle invasion of these structures. This was subcentimeter in size on previous CT study from July in retrospect, measured about 2.7 x 2.0 cm on the previous PET/CT and was FDG avid. This   almost certainly represents significant enlargement of a necrotic metastasis given interval enlargement over recent subsequent study and could be easily verified with percutaneous biopsy if necessary.     BONES: No acute fracture or suspicious osseous lesion.     IMPRESSION:     1. New small bilateral pulmonary nodules, likely metastases.     2. New right renal lesion(s) concerning for metastasis.     3. Significant interval enlargement of necrotic appearing mass in the right ischiorectal fossa, likely metastasis.     4. Decreasing right upper lobe necrotic mass with stable and/or decreasing satellite nodularity.     5. Improved mediastinal and right hilar adenopathy.     6. Stable 2.4 cm left upper lobe groundglass density.        Interval History:  12/10/2024     As above, again admitted; with PCP pneumonia as well as PE.  Will complete atovaquone 23 Dec and will then start PCP prophylaxis with bactrim DS M/W/F     Prednisone taper as above     Concern with CNS flair once steroids at 10 mg daily     Actually doing better than expected     Last labs 12/6/2024 with WBC 7.9 Hgb 7.8  plts 371 ANC 6970 Na 142 k 3.8 Cr 0.75 ALT/AST 11/11 Ca 9.0      Admitted 11/25/2024-12/6/2024         Interval History: 12/30/2024    Doing poorly.  Increased  SOB/CAIN, able to go 3-4 steps in her house without having to sit down.  Cough worse.  Completed Atovaquone for PCP pneumonia on 12/23/2024 and on Bactrim DS MWF but cough worse, breathing worse.  Has PE as well, on Xarleto.  Concern with superimposed pneumonia vs disease progression vs other source, cardiac, infection, lymphangitic spread/pneumonitis that is flaring with steroid taper.  Concern with long term steroids, taper, possible adrenal insufficiency, again with increased dizziness, BP appears normal, electrolytes normal but unclear if possible AI as well.  She is very fatigued.  Has not had chemotherapy since early Nov due to all toxicities.      Labs 12/23/2024 with Na 139 K 3.7 Cr 1.0 Ca 9.0  ALT/AST 7/9 Mg 2.0 WBC 8.9 Hgb 9.6 MCV 99 plts 246 ANC 7740       Interval History: 1/7/2024    Labs (1/1/2025) show increase in Hb from 8.6-9.5, WBC 6.9, , normal BMP.  CT abdomen with shows enlarging complex mass of the upper pole of the right kidney (enlarging metastasis versus primary renal neoplasm).  CTA chest (12/30/2024) was negative for new PE, showed resolution of right-sided PE and near resolution of left PE, enlarging spiculated right upper lobe nodule, increased groundglass opacities in the right lung and increased right upper pole lesion of the kidney as stated above.  MRI brain 12/27/2024 shows a new enhancing lesion in the left frontal lobe suspicious for metastasis, increase in the size of the left occipital and left paramedian parietal lobe lesions with slightly increased vasogenic edema suspicious for posttreatment sequela from radiation other lesions stable, slightly decreased enhancement of bilateral cranial nerves VII and IACS which remains suspicious for leptomeningeal metastatic disease.    The patient was admitted from 12/31 to 1/2 with SOB likely pembrolizumab-induced pneumonitis and was given an increased dose of dexamethasone with plans for taper.  She is currently on Decadron  4 BID.  She is having insomnia at this dose which she didn't have on 2 BID.  Her rectal pain is beginning to recur and she endorsed worsening memory and balance.  She endorsed improved SOB but not back to baseline.  She can walk up three stairs before becoming fatigued      REVIEW OF SYSTEMS: as above   Please note that a 14-point review of systems was performed to include Constitutional, HEENT, Respiratory, CVS, GI, , Musculoskeletal, Integumentary, Neurologic, Rheumatologic, Endocrinologic, Psychiatric, Lymphatic, and Hematologic/Oncologic systems were reviewed and are negative unless otherwise stated in HPI. Positive and negative findings pertinent to this evaluation are incorporated into the history of present illness.    ECOG PS: 3    PROBLEM LIST:  Patient Active Problem List   Diagnosis    TB lung, latent    Psoriatic arthritis (HCC)    Essential thrombocytosis (HCC)    Hypertension    Mixed hyperlipidemia    Encounter for monitoring of hydroxyurea therapy    JAK2 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    Age-related osteoporosis without current pathological fracture    Malignant neoplasm of upper lobe, right bronchus or lung (HCC)    Bilateral leg pain    Insomnia    Moderate protein-calorie malnutrition (HCC)    Dehydration    Visual disturbance    Metastatic cancer to brain (HCC)    Brain mass    Lung cancer metastatic to brain (HCC)    Acute metabolic encephalopathy    Altered mental status    Hypothyroidism    Abnormal imaging of central nervous system    Right hip pain    Goals of care, counseling/discussion    Cancer associated pain    Palliative care patient    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 2023), Essential thrombocytosis (HCC), Hyperlipidemia, Hypertension, Mass in chest, Nodular goiter, Osteoporosis, Pneumonia (Oct 16, 2023), Psoriasis, and SIRS (systemic inflammatory response syndrome) (HCC) (06/22/2024).    PAST SURGICAL HISTORY:   has a past surgical history that includes Appendectomy; Mammo needle localization right (all inc) (Right, 07/13/2009); Skin lesion excision; Colonoscopy (10/31/2018); Mammo (historical); DXA procedure(historical) (04/21/2017); Breast cyst excision (Right, 2009); IR lumbar puncture (9/12/2024); and IR biopsy other (9/12/2024).    CURRENT MEDICATIONS  Current Outpatient Medications   Medication Sig Dispense Refill    acetaminophen (TYLENOL) 325 mg tablet Take 2 tablets (650 mg total) by mouth every 6 (six) hours as needed for mild pain or moderate pain Taking as needed.      amLODIPine (NORVASC) 5 mg tablet Take 1 tablet (5 mg total) by mouth daily 30 tablet 0    dexamethasone (DECADRON) 4 mg tablet Take 1 tablet (4 mg total) by mouth every 8 (eight) hours for 5 days, THEN 1 tablet (4 mg total) every 12 (twelve) hours for 7 days, THEN 1.5 tablets (6 mg total) in the morning for 7 days, THEN 0.5 tablets (2 mg total) 2 (two) times a day for 7 days, THEN 0.5 tablets (2 mg total) in the morning for 7 days. When you get to the 6 mg dosage please take 4 mg in the morning followed by 2 mg in the evening; that would be 1 tablet in the morning followed by 1/2 tablet in the evening for that week.. 50 tablet 0    folic acid (FOLVITE) 1 mg tablet Take 1 tablet (1 mg total) by mouth daily 30 tablet 6    gabapentin (NEURONTIN) 100 mg capsule Take 1 capsule PO in the AM  and 3 capsules PO HS. (Patient taking differently: Take 1 capsule PO in the morning, 1 in the afternoonm and 3 capsules PO HS.)      levETIRAcetam (KEPPRA) 1000 MG tablet Take 1 tablet (1,000 mg total) by mouth every  12 (twelve) hours 60 tablet 0    levothyroxine 50 mcg tablet Take 1 tablet (50 mcg total) by mouth daily in the early morning 30 tablet 0    pantoprazole (PROTONIX) 40 mg tablet Take 1 tablet (40 mg total) by mouth daily in the early morning 30 tablet 5    Probiotic Product (PROBIOTIC DAILY PO) Take 1 capsule by mouth daily ON HOLD      rivaroxaban (Xarelto) 20 mg tablet Take 1 tablet (20 mg total) by mouth daily with breakfast 30 tablet 0    senna (SENOKOT) 8.6 MG tablet Take 1 tablet by mouth daily Takes one every other day      sulfamethoxazole-trimethoprim (BACTRIM DS) 800-160 mg per tablet Take 1 tablet by mouth daily Starting 12/24, you can take one tablet Monday, Wednesday, and Fridays. Do not start before December 24, 2024. 36 tablet 4    vitamin B-12 (VITAMIN B-12) 1,000 mcg tablet Take 1 tablet (1,000 mcg total) by mouth daily 90 tablet 1     No current facility-administered medications for this visit.     [unfilled]    SOCIAL HISTORY:   reports that she has quit smoking. Her smoking use included cigarettes. She started smoking about 59 years ago. She has a 59 pack-year smoking history. She has been exposed to tobacco smoke. She has never used smokeless tobacco. She reports current alcohol use of about 5.0 standard drinks of alcohol per week. She reports that she does not use drugs.     FAMILY HISTORY:  family history includes Cancer in her brother and brother; Coronary artery disease in her brother; Depression in her mother; Hearing loss in her mother; Hypertension in her brother and mother; No Known Problems in her daughter, daughter, maternal aunt, maternal aunt, maternal aunt, maternal aunt, maternal grandfather, maternal grandmother, paternal aunt, paternal grandfather, and paternal grandmother; Stroke in her mother; Tuberculosis in her brother and father.     ALLERGIES:  is allergic to doxycycline, keflex [cephalexin], and neomycin-polymyxin-dexameth.    Physical Exam:  Vital Signs:   Visit Vitals  OB  Status Postmenopausal   Smoking Status Former     There is no height or weight on file to calculate BMI.  There is no height or weight on file to calculate BSA.    GEN: Alert, awake oriented x3, in no acute distress  HEENT- No pallor, icterus, cyanosis, no oral mucosal lesions,   LAD - no palpable cervical, clavicle, axillary, inguinal LAD  Heart- normal S1 S2, regular rate and rhythm, No murmur, rubs.   Lungs- decreased bases, no wheeze, rhonchi   Abdomen- soft, Non tender, bowel sounds present  Extremities- No cyanosis, clubbing, edema  Neuro- No focal neurological deficit    Labs:  Lab Results   Component Value Date    WBC 6.90 01/01/2025    HGB 9.5 (L) 01/01/2025    HCT 31.1 (L) 01/01/2025    MCV 98 01/01/2025     01/01/2025     Lab Results   Component Value Date    SODIUM 138 01/01/2025    K 4.6 01/01/2025     01/01/2025    CO2 27 01/01/2025    AGAP 6 01/01/2025    BUN 17 01/01/2025    CREATININE 0.99 01/01/2025    GLUC 127 01/01/2025    GLUF 127 (H) 01/01/2025    CALCIUM 9.4 01/01/2025    AST 10 (L) 12/30/2024    ALT 7 12/30/2024    ALKPHOS 44 12/30/2024    TP 7.0 12/30/2024    TBILI 0.27 12/30/2024    EGFR 56 01/01/2025     I spent 40 minutes on chart review, face to face counseling time, coordination of care and documentation.    Additional recommendations to follow per attending, Dr. Castro.    Xu Alanis DO, PGY4  Hematology/Oncology Fellow

## 2025-01-08 ENCOUNTER — DOCUMENTATION (OUTPATIENT)
Dept: HEMATOLOGY ONCOLOGY | Facility: CLINIC | Age: 75
End: 2025-01-08

## 2025-01-08 NOTE — PROGRESS NOTES
NEURO ONCOLOGY MULTIDISCIPLINARY CANCER CONFERENCE    DATE:   1/8/2025    PRESENTING PROVIDER: Dr Honey Gamboa    DIAGNOSIS: Stage IV lung cancer        Ayla Nye is a 74 y.o. female who was presented at Neuro Oncology Cancer Conference today.  She has a history of stage IIIB NSCLC of the right upper lobe, s/p concurrent chemoRT completing on 7/5/24. She subsequently developed brain metastases, s/p SRS on 8/8/24 to five supra and infratentorial enhancing lesions.   She was last presented at neuro oncology [facility] on 9/11/24, consensus was for LP, this was performed and negative.  She completed RT to right gluteus mass on 10/18/24. Last systemic therapy given 11/14/24, no treatment since due to multiple admissions/toxicities, PCP pneumonia, pulmonary emboli. She was presented today to review recent imaging studies and discuss treatment options/plan.     PHYSICIAN RECOMMENDED PLAN:   - Discuss Avastin  - Offer SRS to new left frontal lobe lesion    Pathology reviewed:NA    Imaging reviewed:   10/8/2024 MRI brain BT  10/21/2024 MRI brain seizure  11/24/2024 CT PE study w abdomen pelvis  12/27/2024 MRI brain BT  12/31/2024 CT abdomen    Upcoming Appointments:   1/9/2025 Radiation oncology, Dr Gamboa  1/13/2025 Hematology oncology, Dr Kulkarni  1/23/2025 Palliative care, Lisy Morley      Team agreed to plan.   NCCN guidelines were readily available for review at this discussion.    The final treatment plan will be left at the discretion of the patient and the treating physician.     DISCLAIMERS:  TO THE TREATING PHYSICIAN:  This conference is a meeting of clinicians from various specialty areas who evaluate and discuss patients for whom a multidisciplinary treatment approach is being considered. Please note that the above opinion was a consensus of the conference attendees and is intended only to assist in quality care of the discussed patient.  The responsibility for follow up on the input given during the  conference, along with any final decisions regarding plan of care, is that of the patient and the patient's provider. Accordingly, appointments have only been recommended based on this information; and have NOT been scheduled unless otherwise noted.      TO THE PATIENT:  This summary is a brief record of major aspects of your cancer treatment. You may choose to can share a your copy with any of your doctors or nurses. However, this is not a detailed or comprehensive record of your care.

## 2025-01-09 ENCOUNTER — OFFICE VISIT (OUTPATIENT)
Dept: RADIATION ONCOLOGY | Facility: HOSPITAL | Age: 75
End: 2025-01-09
Attending: INTERNAL MEDICINE
Payer: MEDICARE

## 2025-01-09 VITALS
HEART RATE: 82 BPM | SYSTOLIC BLOOD PRESSURE: 118 MMHG | TEMPERATURE: 97.7 F | OXYGEN SATURATION: 95 % | DIASTOLIC BLOOD PRESSURE: 78 MMHG | RESPIRATION RATE: 18 BRPM

## 2025-01-09 DIAGNOSIS — C49.5: ICD-10-CM

## 2025-01-09 DIAGNOSIS — I26.99 BILATERAL PULMONARY EMBOLISM (HCC): ICD-10-CM

## 2025-01-09 DIAGNOSIS — C34.11 MALIGNANT NEOPLASM OF UPPER LOBE, RIGHT BRONCHUS OR LUNG (HCC): Primary | ICD-10-CM

## 2025-01-09 PROCEDURE — 99215 OFFICE O/P EST HI 40 MIN: CPT | Performed by: INTERNAL MEDICINE

## 2025-01-09 PROCEDURE — 77334 RADIATION TREATMENT AID(S): CPT | Performed by: INTERNAL MEDICINE

## 2025-01-09 PROCEDURE — 77470 SPECIAL RADIATION TREATMENT: CPT | Performed by: INTERNAL MEDICINE

## 2025-01-09 PROCEDURE — 99211 OFF/OP EST MAY X REQ PHY/QHP: CPT | Performed by: INTERNAL MEDICINE

## 2025-01-09 NOTE — ASSESSMENT & PLAN NOTE
Ayla Nye is a 74 y.o. female with initially [id number] (IIIB) NSCLC of the right upper lobe, s/p concurrent chemoRT completing on 7/5/24. She subsequently developed brain metastases s/p SRS on 8/8/24 to five supra- and infratentorial enhancing lesions. She had interval admissions related to down titration of steroids which responded to increased steroids. MRI Brain on 9/10/24 showed stability or decrease in some of the treated lesions, with a new tiny left temporal lobe lesion seen. There is unchanged enhancement along bilateral CN VII for which the case was reviewed at [facility] on 9/11/24. The enhancement was present on prior MRIs and an LP was recommended to rule out leptomeningeal carcinomatosis, this was negative for malignancy. She also underwent palliative RT to a right gluteal mass in October 2024.    Recent MRI brain 12/27/24 showed a new enhancing lesion in the left frontal lobe suspicious for metastasis. Prior lesions are stable to decreased with the exception of the left occipital and paramedian parietal lobe lesions favoring posttreatment sequale/radiation effects based on perfusion imaging. There is slightly increased vasogenic edema. There is slightly decreased conspicuity of bilateral cranial nerve enhancement and IACs. Prior LP was negative.     Her case was reviewed at [facility] on 1/8/25 with consensus to offer SRS to the new left frontal lobe lesion and consideration for avastin for radionecrosis/edema refractory to standard steroid therapy. I favor against WBRT as the time-course/behavior of her CN enhancement does not favor leptomeningeal disease as well as negative LP. Furthermore, WBRT would risk exacerbation of her left sided vasogenic edema which has been significantly symptomatic. WBRT remains an option should she develop clear signs of LMD or widespread burden of disease.    I described the indications, risks, benefits, and alternatives to radiation therapy for brain metastases.  I reviewed  that the lesion(s) seen on MRI would be appropriate for SRS. Alternatives include surgical resection, whole brain radiation ± hippocampal avoidance, continued observation, etc. I discussed the simulation and treatment planning process.  I informed the patient of the possible short- and long-term side effects, which inclide but are not limited to, fatigue, headache, nausea, focal alopecia, vomiting, injury to the cranial nerves and/or visual structures including the optic nerve and chiasm, a low risk of neurocognitive effects, neurologic deficits, radionecrosis which may require treatment with steroids or surgical resection, hearing loss, stroke-like symptoms, and a low risk of radiation-induced secondary malignancy.  There is sometimes an increased risk of toxicity in regions of overlap with prior courses of radiation. I reviewed that even with appropriate treatment, there is a risk of progression.  One some occasions, additional/other tumors may develop requiring re-evaluation.    She elected to proceed with SRS and informed consent was signed. CT simulation performed today with single fx treatment scheduled on 1/16/25. Will route to medical oncology to consider Avastin per [facility] recommendations.

## 2025-01-09 NOTE — PROGRESS NOTES
Complex Follow-Up Visit   Name: Ayla Nye      : 1950      MRN: [id number]  Encounter Provider: Honey Gamboa MD  Encounter Date: 2025   Encounter department: [facility] RADIATION ONCOLOGY  :  Assessment & Plan  Malignant neoplasm of upper lobe, right bronchus or lung (HCC)  Ayla Nye is a 74 y.o. female with initially [id number] (IIIB) NSCLC of the right upper lobe, s/p concurrent chemoRT completing on 24. She subsequently developed brain metastases s/p SRS on 24 to five supra- and infratentorial enhancing lesions. She had interval admissions related to down titration of steroids which responded to increased steroids. MRI Brain on 9/10/24 showed stability or decrease in some of the treated lesions, with a new tiny left temporal lobe lesion seen. There is unchanged enhancement along bilateral CN VII for which the case was reviewed at [facility] on 24. The enhancement was present on prior MRIs and an LP was recommended to rule out leptomeningeal carcinomatosis, this was negative for malignancy. She also underwent palliative RT to a right gluteal mass in 2024.    Recent MRI brain 24 showed a new enhancing lesion in the left frontal lobe suspicious for metastasis. Prior lesions are stable to decreased with the exception of the left occipital and paramedian parietal lobe lesions favoring posttreatment sequale/radiation effects based on perfusion imaging. There is slightly increased vasogenic edema. There is slightly decreased conspicuity of bilateral cranial nerve enhancement and IACs. Prior LP was negative.     Her case was reviewed at [facility] on 25 with consensus to offer SRS to the new left frontal lobe lesion and consideration for avastin for radionecrosis/edema refractory to standard steroid therapy. I favor against WBRT as the time-course/behavior of her CN enhancement does not favor leptomeningeal disease as well as negative LP. Furthermore,  "WBRT would risk exacerbation of her left sided vasogenic edema which has been significantly symptomatic. WBRT remains an option should she develop clear signs of LMD or widespread burden of disease.    I described the indications, risks, benefits, and alternatives to radiation therapy for brain metastases.  I reviewed that the lesion(s) seen on MRI would be appropriate for SRS. Alternatives include surgical resection, whole brain radiation ± hippocampal avoidance, continued observation, etc. I discussed the simulation and treatment planning process.  I informed the patient of the possible short- and long-term side effects, which inclide but are not limited to, fatigue, headache, nausea, focal alopecia, vomiting, injury to the cranial nerves and/or visual structures including the optic nerve and chiasm, a low risk of neurocognitive effects, neurologic deficits, radionecrosis which may require treatment with steroids or surgical resection, hearing loss, stroke-like symptoms, and a low risk of radiation-induced secondary malignancy.  There is sometimes an increased risk of toxicity in regions of overlap with prior courses of radiation. I reviewed that even with appropriate treatment, there is a risk of progression.  One some occasions, additional/other tumors may develop requiring re-evaluation.    She elected to proceed with SRS and informed consent was signed. CT simulation performed today with single fx treatment scheduled on 1/16/25. Will route to medical oncology to consider Avastin per [facility] recommendations.       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 She enquires about prophylactic treatment before it \"gets worse\". Given time interval since prior RT, mild nature of symptoms, and location, re-irradiation at this time would not have a favorable therapeutic ratio. I recommended medical management of this for now. Should she " develop an enlarging cystic component, palliative aspiration/sclerosis could be considered.           History of Present Illness   Chief Complaint   Patient presents with    Follow-up     Radiation Oncology   Pertinent Medical History   history of [id number] (IIIB) NSCLC of the right upper lobe, s/p concurrent chemoRT completing on 7/5/24. She completed a course of SRS on 8/8/24 after MRI brain demonstrated five supra- and infratentorial enhancing lesions compatible with metastases. She also completed a course of palliative radiation to right gluteus on 10/18/24. She tolerated RT without much toxicity. She continued  to have right buttock pain. Has been prescribed increase in dex to 2mg BID, but not helpful. She was previously prescribed dilaudid but did not fill this medication. Discussed with palliative care who will call the patient to review non-opioid based alternatives including gabapentin. Pain control per palliative medicine.      She was admitted with seizure and neurologic symptoms prior to completion of her last fx of radiation therapy.  Patient received 14/15 fx to right gluteus. Due to prolonged hospitalization (10/16/24-current), patient had a break in treatment. Call placed to patient regarding last treatment, patient elected to complete after 14 treatments. She returns today for follow-up.       11/5/24 Dr. Castro  On treatment with Carboplatin AUC 5 Pemetrexed 500 mg/m2 and Pembrolizumab 200 mg IV every 3 weeks x 4 cycles   C1 held due to hospital admission  C1 held due to radiation and concurrent high-dose steroid use   C1 now 10/7/2024  C2 10/28/2024-held due to admission   C2 now 11/14/2024 11/13/24 Palliative care  Current regimen: Gabapentin 100mg AM, 100mg  Noon, 300mg Bedtime  Tylenol 650mg TID  Heating pad during the day  Pain is much better controlled.   Follow-up in 4 weeks.      11/24 - 12/6/24 Hospital admission  Presented with fatigue SOB, and home temp of 101.3.   Imaging showed  b/l PE and RUL pneumonia     12/10/24 Dr. Castro  Admission 11/24-12/6/2024 PCP pneumonia, PE now on Xarelto    Atovaquone was started for 21 days, to complete 12/23/2024   Will then start Bactrim DS M/W/F   Again remains on prednisone, on current taper -60 mg bid x 5 days, then 40 mg x 5 days then 20 mg x 5 days then 10 mg daily-will be at 10 mg daily by 21 Dec 2024   Doing better, less SOB   Will hold any treatment for now-cannot get IO therapy and unclear that she is a good candidate based on chronic immunosuppression from steroids   Will consider Carboplatin/Alimta later this month      12/27/24 MRI brain BT  IMPRESSION:  1. New enhancing lesion in the left frontal lobe, which is suspicious for metastasis.  2. Increased size of left occipital and left paramedian parietal lobe lesions without appreciable elevated perfusion. Findings favor posttreatment sequelae/radiation effects though recommend continued attention on follow-up. Of note, there is slightly increased associated vasogenic edema in the left cerebral hemisphere, as described above.  3. The other lesions are stable to decreased/less conspicuous compared to the prior study.  4. 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     12/30/24 CTA chest pe study  IMPRESSION:  No new pulmonary embolism. Resolution of previously seen right-sided pulmonary embolism. Near complete resolution of previously seen left lower lobe pulmonary embolism with questionable minimal residual linear thrombus in this region.  Spiculated right upper lobe nodule measures slightly larger than on prior exam. Increase in right lung groundglass opacities which may represent worsening pneumonia and/or malignancy.  Incompletely evaluated right upper pole lesion appears larger than on prior exam, suspicious for metastasis. Dedicated abdominal imaging is recommended.      12/30/24 Dr. Castro  No treatment since 11/14/2024 due to multiple admissions/toxicities, PCP pneumonia, PE on Xarelto   reated for PCP pneumonia with Atovaquone completed 12/23/2024 now on Bactrim DS MW F  Able to walk only few steps and has to rest  Prednisone tapered to 10 mg daily and now again more issues with balance  Would rule out adrenal insuffciency due to long term steroids/tapering/effects  May need to increase back to 20 mg daily prednisone vs hydrocortisone  Assess for dehydration as well  Will be sent to ER for further evaluation      12/30/24 Presented to ED     1/7/25 Dr. Castro  SOB is improved but not back to baseline, on Decadron 4 BID, having insomnia, suspect pneumonitis was due to pembrolizumab   Worsening rectal pain, recommended follow up discussion with radiation oncology to consider whether she is a candidate for repeat radiation/had prior SBRT        Upcoming appts:  1/8/25 Palliative care  1/15/25 Infusion   1/29/25 Pulmonary    She notes mild headache, not enough to complain about. Somewhat leaning to one side. No nausea. Some dizziness. She feels her vision is limited due to need for cataract surgery. She continues to have buttock pain despite medical management.     Oncology History   Cancer Staging   Malignant neoplasm of upper lobe, right bronchus or lung (HCC)  Staging form: Lung, AJCC 8th Edition  - Clinical stage from 4/15/2024: Stage IIIB (cT2b, cN3, cM0) - Signed by Rogelio Beebe DO on 4/15/2024  - Clinical: Stage IV (cM1) - Signed by Honey Gamboa MD on 9/23/2024  Oncology History   Malignant neoplasm of upper lobe, right bronchus or lung (HCC)   2024 Initial Diagnosis    Primary lung adenocarcinoma, right (HCC)     3/26/2024 Biopsy    A-C. Lymph Node, Level 4R :    - Metastatic non-small cell carcinoma, most compatible with a primary lung adenocarcinoma; see note.       D-F. Lymph Node, Level 10R:    - Metastatic non-small cell carcinoma; see note.        G-I. Lymph Node, Level 4L:    - Metastatic non-small cell carcinoma; see note.       4/15/2024 -  Cancer Staged    Staging form: Lung, AJCC 8th Edition  - Clinical stage from 4/15/2024: Stage IIIB (cT2b, cN3, cM0) - Signed by Rogelio Beebe DO on 4/15/2024       5/23/2024 - 7/2/2024 Chemotherapy    alteplase (CATHFLO), 2 mg, Intracatheter, Every 1 Minute as needed, 6 of 6 cycles  CARBOplatin (PARAPLATIN) IVPB (GOG AUC DOSING), 130.4 mg, Intravenous, Once, 6 of 6 cycles  Administration: 130.4 mg (5/23/2024), 144.8 mg (5/30/2024), 138.4 mg (6/6/2024), 144.8 mg (6/13/2024), 132 mg (6/21/2024), 143.6 mg (7/2/2024)  PACLItaxel (TAXOL) chemo IVPB, 45 mg/m2 = 67.2 mg (90 % of original dose 50 mg/m2), Intravenous, Once, 6 of 6 cycles  Dose modification: 45 mg/m2 (original dose 50 mg/m2, Cycle 1, Reason: Anticipated Tolerance)  Administration: 67.2 mg (5/23/2024), 67.2 mg (5/30/2024), 67.2 mg (6/6/2024), 67.2 mg (6/13/2024), 67.2 mg (6/21/2024), 67.2 mg (7/2/2024)     5/23/2024 - 7/5/2024 Radiation    Treatment:  Course: C1    Plan ID Energy Fractions Dose per Fraction (cGy) Dose Correction (cGy) Total Dose Delivered (cGy) Elapsed Days   R Lung_Hilum 6X 30 / 30 200 0 6,000 43      Treatment dates:  C1: 5/23/2024 - 7/5/2024 8/8/2024 - 8/8/2024 Radiation    SRS 5 PTVs   2000 cGy     9/12/2024 Biopsy    Mass, Superficial perianal mass:  - Squamous cell carcinoma.     Note: The patient's prior lung EBUS sampling shows the tumor to stain for TTF1 and Napsin with absent p40 expression.  The current perianal mass sampling shows diffuse p40 expression with absent TTF1 expression.  This may represent a primary anal/perianal squamous cell carcinoma versus a possible metastatic combined lung tumor (adenocarcinoma and previously unsampled squamous component).  Suggest clinical correlation and appropriate follow-up.         9/23/2024 -  Cancer Staged    Staging form: Lung, AJCC 8th Edition  - Clinical: Stage IV (cM1) -  Signed by Honey Gamboa MD on 9/23/2024       10/1/2024 - 10/18/2024 Radiation    Course: C3    Plan ID Energy Fractions Dose per Fraction (cGy) Dose Correction (cGy) Total Dose Delivered (cGy) Elapsed Days   R Gluteus:1 10X/6X 14 / 15 300 0 4,200 17      Treatment Dates:  10/1/2024 - 10/18/2024.       10/7/2024 -  Chemotherapy    cyanocobalamin, 1,000 mcg, Intramuscular, Once, 1 of 3 cycles  Administration: 1,000 mcg (8/19/2024)  alteplase (CATHFLO), 2 mg, Intracatheter, Every 1 Minute as needed, 2 of 6 cycles  fosaprepitant (EMEND) IVPB, 150 mg, Intravenous, Once, 2 of 6 cycles  Administration: 150 mg (10/7/2024), 150 mg (11/14/2024)  CARBOplatin (PARAPLATIN) IVPB (GOG AUC DOSING), 347 mg, Intravenous, Once, 2 of 6 cycles  Administration: 347 mg (10/7/2024), 346 mg (11/14/2024)  pemetrexed (ALIMTA) chemo infusion, 735 mg, Intravenous, Once, 2 of 6 cycles  Administration: 700 mg (10/7/2024), 700 mg (11/14/2024)  pembrolizumab (KEYTRUDA) IVPB, 200 mg, Intravenous, Once, 2 of 6 cycles  Administration: 200 mg (11/14/2024), 200 mg (10/7/2024)     Metastatic cancer to brain (HCC)   7/29/2024 Initial Diagnosis    Metastatic cancer to brain (HCC)     8/8/2024 - 8/8/2024 Radiation    SRS 5 PTVs   2000 cGy     9/12/2024 Biopsy    Mass, Superficial perianal mass:  - Squamous cell carcinoma.     Note: The patient's prior lung EBUS sampling shows the tumor to stain for TTF1 and Napsin with absent p40 expression.  The current perianal mass sampling shows diffuse p40 expression with absent TTF1 expression.  This may represent a primary anal/perianal squamous cell carcinoma versus a possible metastatic combined lung tumor (adenocarcinoma and previously unsampled squamous component).  Suggest clinical correlation and appropriate follow-up.         10/1/2024 - 10/18/2024 Radiation    Course: C3    Plan ID Energy Fractions Dose per Fraction (cGy) Dose Correction (cGy) Total Dose Delivered (cGy) Elapsed Days   R Gluteus:1 10X/6X  14 / 15 300 0 4,200 17      Treatment Dates:  10/1/2024 - 10/18/2024.          Review of Systems Refer to nursing note.         Objective   There were no vitals taken for this visit.    Pain Screening:     ECOG    Physical Exam   General Appearance:  Alert, cooperative, no distress, appears stated age  Lungs: Respirations unlabored, no cyanosis, able to speak in complete sentences without dyspnea.  Abdomen: Non-distended  Extremities: No cyanosis or edema  Skin: No generalized rash or dermatitis  Neurologic: ANOx3, speech and cognition intact. No obvious neurologic deficits. Gait largely intact.    Radiology Results: I have reviewed radiology images/reports described above.       Administrative Statements   I have spent a total time of 50 minutes in caring for this patient on the day of the visit/encounter including Diagnostic results, Risks and benefits of tx options, Instructions for management, Patient and family education, Impressions, Counseling / Coordination of care, Documenting in the medical record, Reviewing / ordering tests, medicine, procedures  , and Obtaining or reviewing history  .

## 2025-01-09 NOTE — ASSESSMENT & PLAN NOTE
"We reviewed her gluteal discomfort which is currently 3/10 in intensity. She enquires about prophylactic treatment before it \"gets worse\". Given time interval since prior RT, mild nature of symptoms, and location, re-irradiation at this time would not have a favorable therapeutic ratio. I recommended medical management of this for now. Should she develop an enlarging cystic component, palliative aspiration/sclerosis could be considered.       "

## 2025-01-09 NOTE — PROGRESS NOTES
Ayla Nye 1950 is a 74 y.o. female with history of [id number] (IIIB) NSCLC of the right upper lobe, s/p concurrent chemoRT completing on 7/5/24. She completed a course of SRS on 8/8/24 after MRI brain demonstrated five supra- and infratentorial enhancing lesions compatible with metastases. She also completed a course of palliative radiation to right gluteus on 10/18/24. She tolerated RT without much toxicity. She continued  to have right buttock pain. Has been prescribed increase in dex to 2mg BID, but not helpful. She was previously prescribed dilaudid but did not fill this medication. Discussed with palliative care who will call the patient to review non-opioid based alternatives including gabapentin. Pain control per palliative medicine.     She was admitted with seizure and neurologic symptoms prior to completion of her last fx of radiation therapy.  Patient received 14/15 fx to right gluteus. Due to prolonged hospitalization (10/16/24-current), patient had a break in treatment. Call placed to patient regarding last treatment, patient stated she was ok with ending after 14 treatments. She returns today for first routine follow-up.       11/5/24 Dr. Castro  On treatment with Carboplatin AUC 5 Pemetrexed 500 mg/m2 and Pembrolizumab 200 mg IV every 3 weeks x 4 cycles   C1 held due to hospital admission  C1 held due to radiation and concurrent high-dose steroid use   C1 now 10/7/2024  C2 10/28/2024-held due to admission   C2 now 11/14/2024 11/13/24 Palliative care  Current regimen: Gabapentin 100mg AM, 100mg  Noon, 300mg Bedtime  Tylenol 650mg TID  Heating pad during the day  Pain is much better controlled.   Follow-up in 4 weeks.      11/24 - 12/6/24 Hospital admission  Presented with fatigue SOB, and home temp of 101.3.   Imaging showed b/l PE and RUL pneumonia      12/10/24 Dr. Castro  Admission 11/24-12/6/2024 PCP pneumonia, PE now on Xarelto    Atovaquone was started for 21 days, to  complete 12/23/2024   Will then start Bactrim DS M/W/F   Again remains on prednisone, on current taper -60 mg bid x 5 days, then 40 mg x 5 days then 20 mg x 5 days then 10 mg daily-will be at 10 mg daily by 21 Dec 2024   Doing better, less SOB   Will hold any treatment for now-cannot get IO therapy and unclear that she is a good candidate based on chronic immunosuppression from steroids   Will consider Carboplatin/Alimta later this month        12/27/24 MRI brain BT  IMPRESSION:  1. New enhancing lesion in the left frontal lobe, which is suspicious for metastasis.  2. Increased size of left occipital and left paramedian parietal lobe lesions without appreciable elevated perfusion. Findings favor posttreatment sequelae/radiation effects though recommend continued attention on follow-up. Of note, there is slightly   increased associated vasogenic edema in the left cerebral hemisphere, as described above.  3. The other lesions are stable to decreased/less conspicuous compared to the prior study.  4. 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    12/30/24 CTA chest pe study  IMPRESSION:  No new pulmonary embolism. Resolution of previously seen right-sided pulmonary embolism. Near complete resolution of previously seen left lower lobe pulmonary embolism with questionable minimal residual linear thrombus in this region.  Spiculated right upper lobe nodule measures slightly larger than on prior exam. Increase in right lung groundglass opacities which may represent worsening pneumonia and/or malignancy.  Incompletely evaluated right upper pole lesion appears larger than on prior exam, suspicious for metastasis. Dedicated abdominal imaging is recommended.    12/30/24 Dr. Castro  No treatment since 11/14/2024 due to multiple admissions/toxicities, PCP pneumonia, PE on Xarelto   reated for PCP pneumonia with  Atovaquone completed 12/23/2024 now on Bactrim DS MW F  Able to walk only few steps and has to rest  Prednisone tapered to 10 mg daily and now again more issues with balance  Would rule out adrenal insuffciency due to long term steroids/tapering/effects  May need to increase back to 20 mg daily prednisone vs hydrocortisone  Assess for dehydration as well  Will be sent to ER for further evaluation     12/30/24 Presented to ED at the recommendation of Dr. Castro.     1/7/25 Dr. Castro  No treatment since 11/14/2024 due to multiple admissions/toxicities, PCP pneumonia, PE on Xarelto (C2 carbo/alimta/keytruda on 11/14/24)  SOB is improved but not back to baseline, on Decadron 4 BID, having insomnia, suspect pneumonitis was due to pembrolizumab   Continue Decadron 4 BID for now with plans for eventual taper to no less than 2 BID  Recommended follow up with radiation oncology to consider whole brain radiation (current appointment with Dr. Gamboa on 1/9), if she pursues whole brain then we will hold chemotherapy,   Will hold on LP for now; may not prove leptomeningeal spread, would have to stop DOAC and IT chemo of little benefit in this disease   Had prior SBRT to brain lesions; did not have WBRT which can be used for leptomeningeal disease rather than IT chemo   Will see her back in one week to consider systemic therapy if not getting more RT.         1/8/25 NEURO ONCOLOGY MULTIDISCIPLINARY CANCER CONFERENCE   PHYSICIAN RECOMMENDED PLAN:   - Discuss Avastin  - Offer SRS to new left frontal lobe lesion      Upcoming appts:  1/13/25 Dr. Castro  1/15/25 Infusion   1/23/25 Palliative care  1/29/25 Pulmonary        Oncology History   Malignant neoplasm of upper lobe, right bronchus or lung (HCC)   2024 Initial Diagnosis    Primary lung adenocarcinoma, right (HCC)     3/26/2024 Biopsy    A-C. Lymph Node, Level 4R :    - Metastatic non-small cell carcinoma, most compatible with a primary lung adenocarcinoma; see note.        D-F. Lymph Node, Level 10R:    - Metastatic non-small cell carcinoma; see note.       G-I. Lymph Node, Level 4L:    - Metastatic non-small cell carcinoma; see note.       4/15/2024 -  Cancer Staged    Staging form: Lung, AJCC 8th Edition  - Clinical stage from 4/15/2024: Stage IIIB (cT2b, cN3, cM0) - Signed by Rogelio Beebe DO on 4/15/2024       5/23/2024 - 7/2/2024 Chemotherapy    alteplase (CATHFLO), 2 mg, Intracatheter, Every 1 Minute as needed, 6 of 6 cycles  CARBOplatin (PARAPLATIN) IVPB (GOG AUC DOSING), 130.4 mg, Intravenous, Once, 6 of 6 cycles  Administration: 130.4 mg (5/23/2024), 144.8 mg (5/30/2024), 138.4 mg (6/6/2024), 144.8 mg (6/13/2024), 132 mg (6/21/2024), 143.6 mg (7/2/2024)  PACLItaxel (TAXOL) chemo IVPB, 45 mg/m2 = 67.2 mg (90 % of original dose 50 mg/m2), Intravenous, Once, 6 of 6 cycles  Dose modification: 45 mg/m2 (original dose 50 mg/m2, Cycle 1, Reason: Anticipated Tolerance)  Administration: 67.2 mg (5/23/2024), 67.2 mg (5/30/2024), 67.2 mg (6/6/2024), 67.2 mg (6/13/2024), 67.2 mg (6/21/2024), 67.2 mg (7/2/2024)     5/23/2024 - 7/5/2024 Radiation    Treatment:  Course: C1    Plan ID Energy Fractions Dose per Fraction (cGy) Dose Correction (cGy) Total Dose Delivered (cGy) Elapsed Days   R Lung_Hilum 6X 30 / 30 200 0 6,000 43      Treatment dates:  C1: 5/23/2024 - 7/5/2024 8/8/2024 - 8/8/2024 Radiation    SRS 5 PTVs   2000 cGy     9/12/2024 Biopsy    Mass, Superficial perianal mass:  - Squamous cell carcinoma.     Note: The patient's prior lung EBUS sampling shows the tumor to stain for TTF1 and Napsin with absent p40 expression.  The current perianal mass sampling shows diffuse p40 expression with absent TTF1 expression.  This may represent a primary anal/perianal squamous cell carcinoma versus a possible metastatic combined lung tumor (adenocarcinoma and previously unsampled squamous component).  Suggest clinical correlation and appropriate follow-up.         9/23/2024 -   Cancer Staged    Staging form: Lung, AJCC 8th Edition  - Clinical: Stage IV (cM1) - Signed by Honey Gamboa MD on 9/23/2024       10/1/2024 - 10/18/2024 Radiation    Course: C3    Plan ID Energy Fractions Dose per Fraction (cGy) Dose Correction (cGy) Total Dose Delivered (cGy) Elapsed Days   R Gluteus:1 10X/6X 14 / 15 300 0 4,200 17      Treatment Dates:  10/1/2024 - 10/18/2024.       10/7/2024 -  Chemotherapy    cyanocobalamin, 1,000 mcg, Intramuscular, Once, 1 of 3 cycles  Administration: 1,000 mcg (8/19/2024)  alteplase (CATHFLO), 2 mg, Intracatheter, Every 1 Minute as needed, 2 of 6 cycles  fosaprepitant (EMEND) IVPB, 150 mg, Intravenous, Once, 2 of 6 cycles  Administration: 150 mg (10/7/2024), 150 mg (11/14/2024)  CARBOplatin (PARAPLATIN) IVPB (GOG AUC DOSING), 347 mg, Intravenous, Once, 2 of 6 cycles  Administration: 347 mg (10/7/2024), 346 mg (11/14/2024)  pemetrexed (ALIMTA) chemo infusion, 735 mg, Intravenous, Once, 2 of 6 cycles  Administration: 700 mg (10/7/2024), 700 mg (11/14/2024)  pembrolizumab (KEYTRUDA) IVPB, 200 mg, Intravenous, Once, 2 of 6 cycles  Administration: 200 mg (11/14/2024), 200 mg (10/7/2024)     Metastatic cancer to brain (HCC)   7/29/2024 Initial Diagnosis    Metastatic cancer to brain (HCC)     8/8/2024 - 8/8/2024 Radiation    SRS 5 PTVs   2000 cGy     9/12/2024 Biopsy    Mass, Superficial perianal mass:  - Squamous cell carcinoma.     Note: The patient's prior lung EBUS sampling shows the tumor to stain for TTF1 and Napsin with absent p40 expression.  The current perianal mass sampling shows diffuse p40 expression with absent TTF1 expression.  This may represent a primary anal/perianal squamous cell carcinoma versus a possible metastatic combined lung tumor (adenocarcinoma and previously unsampled squamous component).  Suggest clinical correlation and appropriate follow-up.         10/1/2024 - 10/18/2024 Radiation    Course: C3    Plan ID Energy Fractions Dose per Fraction (cGy)  Dose Correction (cGy) Total Dose Delivered (cGy) Elapsed Days   R Gluteus:1 10X/6X 14 / 15 300 0 4,200 17      Treatment Dates:  10/1/2024 - 10/18/2024.           Review of Systems:  Review of Systems   Constitutional:  Positive for appetite change (decreased, taste is decreased) and fatigue.   HENT: Negative.  Negative for hearing loss.    Eyes:  Positive for visual disturbance (blurry (needs cataract surgery)).   Respiratory:  Positive for cough (mild) and shortness of breath.    Cardiovascular: Negative.    Gastrointestinal:  Negative for constipation and diarrhea.   Endocrine: Negative.    Genitourinary:  Positive for pelvic pain (sacral, 3/10, taking gabapentin tylenol and dex).   Musculoskeletal:  Positive for arthralgias.   Skin: Negative.    Allergic/Immunologic: Negative.    Neurological:  Positive for dizziness (with quick movements) and headaches (occasional, mild). Negative for weakness.   Hematological: Negative.    Psychiatric/Behavioral:  Positive for sleep disturbance (with dex).        Clinical Trial: no      Health Maintenance   Topic Date Due    RSV Vaccine Age 60+ Years (1 - Risk 60-74 years 1-dose series) Never done    Zoster Vaccine (1 of 2) 03/28/2014    COVID-19 Vaccine (5 - 2024-25 season) 09/01/2024    Breast Cancer Screening: Mammogram  10/25/2024    Fall Risk  08/28/2025    Urinary Incontinence Screening  08/28/2025    Medicare Annual Wellness Visit (AWV)  08/28/2025    Depression Screening  09/23/2025    BMI: Adult  01/07/2026    DXA SCAN  10/25/2028    Colorectal Cancer Screening  08/13/2029    Hepatitis C Screening  Completed    Osteoporosis Screening  Completed    Pneumococcal Vaccine: 65+ Years  Completed    Influenza Vaccine  Completed    Meningococcal B Vaccine  Aged Out    RSV Vaccine age 0-20 Months  Aged Out    HIB Vaccine  Aged Out    IPV Vaccine  Aged Out    Hepatitis A Vaccine  Aged Out    Meningococcal ACWY Vaccine  Aged Out    HPV Vaccine  Aged Out     Patient Active  Problem List   Diagnosis    TB lung, latent    Psoriatic arthritis (HCC)    Essential thrombocytosis (HCC)    Hypertension    Mixed hyperlipidemia    Encounter for monitoring of hydroxyurea therapy    JAK2 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    Age-related osteoporosis without current pathological fracture    Malignant neoplasm of upper lobe, right bronchus or lung (HCC)    Bilateral leg pain    Insomnia    Moderate protein-calorie malnutrition (HCC)    Dehydration    Visual disturbance    Metastatic cancer to brain (HCC)    Brain mass    Lung cancer metastatic to brain (HCC)    Acute metabolic encephalopathy    Altered mental status    Hypothyroidism    Abnormal imaging of central nervous system    Right hip pain    Goals of care, counseling/discussion    Cancer associated pain    Palliative care patient    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 2023    Due to have durgery June 2024    Essential thrombocytosis (HCC)     controlled with medication    Hyperlipidemia     Hypertension     Mass in chest     Nodular goiter     Osteoporosis     Pneumonia Oct 16, 2023    Also  Feb 2024    Psoriasis     SIRS (systemic inflammatory response syndrome) (HCC) 06/22/2024     Past Surgical History:   Procedure Laterality Date    APPENDECTOMY      BREAST CYST EXCISION Right 2009    COLONOSCOPY  10/31/2018    DXA PROCEDURE (HISTORICAL)  04/21/2017    IR BIOPSY OTHER  9/12/2024    IR LUMBAR PUNCTURE  9/12/2024    MAMMO (HISTORICAL)      8-24-18     MAMMO NEEDLE LOCALIZATION RIGHT (ALL INC) Right 2009    SKIN LESION EXCISION      bridge of nose     Family History   Problem Relation Age of Onset    Stroke Mother     Depression Mother             Hypertension Mother     Hearing loss Mother     Tuberculosis Father     Cancer Brother         lung    Tuberculosis Brother     Coronary artery disease Brother     Hypertension Brother     No Known Problems Daughter     No Known Problems Daughter     No Known Problems Maternal Grandmother     No Known Problems Maternal Grandfather     No Known Problems Paternal Grandmother     No Known Problems Paternal Grandfather     No Known Problems Maternal Aunt     No Known Problems Maternal Aunt     No Known Problems Maternal Aunt     No Known Problems Maternal Aunt     No Known Problems Paternal Aunt     Cancer Brother         Throat canver     Social History     Socioeconomic History    Marital status:      Spouse name: Not on file    Number of children: Not on file    Years of education: Not on file    Highest education level: Not on file   Occupational History    Not on file   Tobacco Use    Smoking status: Former     Current packs/day: 1.00     Average packs/day: 1 pack/day for 59.0 years (59.0 ttl pk-yrs)     Types: Cigarettes     Start date:      Passive exposure: Past    Smokeless tobacco: Never    Tobacco comments:     Quit    Vaping Use    Vaping status: Never Used   Substance and Sexual Activity    Alcohol use: Yes     Alcohol/week: 5.0 standard drinks of alcohol     Types: 5 Glasses of wine per week    Drug use: Never    Sexual activity: Not Currently     Partners: Male     Birth control/protection: Post-menopausal   Other Topics Concern    Not on file   Social History Narrative    Not on file     Social Drivers of Health     Financial Resource Strain: Low Risk  (2023)    Overall Financial Resource Strain (CARDIA)     Difficulty of Paying Living Expenses: Not hard at all   Food  Insecurity: No Food Insecurity (2024)    Nursing - Inadequate Food Risk Classification     Worried About Running Out of Food in the Last Year: Never true     Ran Out of Food in the Last Year: Never true     Ran Out of Food in the Last Year: Never true   Transportation Needs: No Transportation Needs (2024)    Nursing - Transportation Risk Classification     Lack of Transportation: Not on file     Lack of Transportation: No   Physical Activity: Not on file   Stress: Not on file   Social Connections: Not on file   Intimate Partner Violence: Unknown (2024)    Nursing IPS     Feels Physically and Emotionally Safe: Not on file     Physically Hurt by Someone: Not on file     Humiliated or Emotionally Abused by Someone: Not on file     Physically Hurt by Someone: No     Hurt or Threatened by Someone: No   Housing Stability: Unknown (2024)    Nursing: Inadequate Housing Risk Classification     Has Housing: Not on file     Worried About Losing Housing: Not on file     Unable to Get Utilities: Not on file     Unable to Pay for Housing in the Last Year: No     Has Housin       Current Outpatient Medications:     acetaminophen (TYLENOL) 325 mg tablet, Take 2 tablets (650 mg total) by mouth every 6 (six) hours as needed for mild pain or moderate pain Taking as needed., Disp: , Rfl:     amLODIPine (NORVASC) 5 mg tablet, Take 1 tablet (5 mg total) by mouth daily, Disp: 30 tablet, Rfl: 0    dexamethasone (DECADRON) 4 mg tablet, Take 1 tablet (4 mg total) by mouth every 8 (eight) hours for 5 days, THEN 1 tablet (4 mg total) every 12 (twelve) hours for 7 days, THEN 1.5 tablets (6 mg total) in the morning for 7 days, THEN 0.5 tablets (2 mg total) 2 (two) times a day for 7 days, THEN 0.5 tablets (2 mg total) in the morning for 7 days. When you get to the 6 mg dosage please take 4 mg in the morning followed by 2 mg in the evening; that would be 1 tablet in the morning followed by 1/2 tablet in the evening for  that week.., Disp: 50 tablet, Rfl: 0    folic acid (FOLVITE) 1 mg tablet, Take 1 tablet (1 mg total) by mouth daily, Disp: 30 tablet, Rfl: 6    gabapentin (NEURONTIN) 100 mg capsule, Take 1 capsule PO in the AM  and 3 capsules PO HS., Disp: , Rfl:     levETIRAcetam (KEPPRA) 1000 MG tablet, Take 1 tablet (1,000 mg total) by mouth every 12 (twelve) hours, Disp: 60 tablet, Rfl: 0    levothyroxine 50 mcg tablet, Take 1 tablet (50 mcg total) by mouth daily in the early morning, Disp: 30 tablet, Rfl: 0    pantoprazole (PROTONIX) 40 mg tablet, Take 1 tablet (40 mg total) by mouth daily in the early morning, Disp: 30 tablet, Rfl: 5    Probiotic Product (PROBIOTIC DAILY PO), Take 1 capsule by mouth daily ON HOLD, Disp: , Rfl:     rivaroxaban (Xarelto) 20 mg tablet, Take 1 tablet (20 mg total) by mouth daily with breakfast, Disp: 30 tablet, Rfl: 0    senna (SENOKOT) 8.6 MG tablet, Take 1 tablet by mouth daily Takes one every other day, Disp: , Rfl:     sulfamethoxazole-trimethoprim (BACTRIM DS) 800-160 mg per tablet, Take 1 tablet by mouth daily Starting 12/24, you can take one tablet Monday, Wednesday, and Fridays. Do not start before December 24, 2024., Disp: 36 tablet, Rfl: 4    vitamin B-12 (VITAMIN B-12) 1,000 mcg tablet, Take 1 tablet (1,000 mcg total) by mouth daily, Disp: 90 tablet, Rfl: 1  Allergies   Allergen Reactions    Doxycycline Headache    Keflex [Cephalexin] Rash    Neomycin-Polymyxin-Dexameth Eye Swelling     Vitals:    01/09/25 0920   BP: 118/78   BP Location: Left arm   Pulse: 82   Resp: 18   Temp: 97.7 °F (36.5 °C)   TempSrc: Temporal   SpO2: 95%

## 2025-01-10 DIAGNOSIS — Z51.5 PALLIATIVE CARE PATIENT: ICD-10-CM

## 2025-01-10 DIAGNOSIS — G89.3 CANCER RELATED PAIN: ICD-10-CM

## 2025-01-10 PROCEDURE — 77338 DESIGN MLC DEVICE FOR IMRT: CPT | Performed by: INTERNAL MEDICINE

## 2025-01-10 PROCEDURE — 77300 RADIATION THERAPY DOSE PLAN: CPT | Performed by: INTERNAL MEDICINE

## 2025-01-10 PROCEDURE — 77301 RADIOTHERAPY DOSE PLAN IMRT: CPT | Performed by: INTERNAL MEDICINE

## 2025-01-13 ENCOUNTER — TELEPHONE (OUTPATIENT)
Dept: HEMATOLOGY ONCOLOGY | Facility: CLINIC | Age: 75
End: 2025-01-13

## 2025-01-13 ENCOUNTER — OFFICE VISIT (OUTPATIENT)
Dept: HEMATOLOGY ONCOLOGY | Facility: CLINIC | Age: 75
End: 2025-01-13
Payer: MEDICARE

## 2025-01-13 VITALS
WEIGHT: 123.5 LBS | HEART RATE: 85 BPM | BODY MASS INDEX: 22.73 KG/M2 | TEMPERATURE: 97.9 F | HEIGHT: 62 IN | OXYGEN SATURATION: 94 % | DIASTOLIC BLOOD PRESSURE: 70 MMHG | SYSTOLIC BLOOD PRESSURE: 138 MMHG

## 2025-01-13 DIAGNOSIS — C79.31 METASTATIC CANCER TO BRAIN (HCC): ICD-10-CM

## 2025-01-13 DIAGNOSIS — E03.9 ACQUIRED HYPOTHYROIDISM: ICD-10-CM

## 2025-01-13 DIAGNOSIS — C79.31 MALIGNANT NEOPLASM METASTATIC TO BRAIN (HCC): ICD-10-CM

## 2025-01-13 DIAGNOSIS — C34.11 MALIGNANT NEOPLASM OF UPPER LOBE, RIGHT BRONCHUS OR LUNG (HCC): Primary | ICD-10-CM

## 2025-01-13 DIAGNOSIS — D47.3 ESSENTIAL THROMBOCYTOSIS (HCC): ICD-10-CM

## 2025-01-13 PROCEDURE — 99215 OFFICE O/P EST HI 40 MIN: CPT | Performed by: INTERNAL MEDICINE

## 2025-01-13 RX ORDER — SODIUM CHLORIDE 9 MG/ML
20 INJECTION, SOLUTION INTRAVENOUS ONCE
Status: CANCELLED | OUTPATIENT
Start: 2025-01-23

## 2025-01-13 RX ORDER — GABAPENTIN 100 MG/1
CAPSULE ORAL
Qty: 150 CAPSULE | Refills: 5 | Status: ON HOLD | OUTPATIENT
Start: 2025-01-13

## 2025-01-13 RX ORDER — PALONOSETRON 0.05 MG/ML
0.25 INJECTION, SOLUTION INTRAVENOUS ONCE
Status: CANCELLED | OUTPATIENT
Start: 2025-01-23

## 2025-01-13 RX ORDER — CYANOCOBALAMIN 1000 UG/ML
1000 INJECTION, SOLUTION INTRAMUSCULAR; SUBCUTANEOUS ONCE
Status: CANCELLED | OUTPATIENT
Start: 2025-01-23 | End: 2025-01-30

## 2025-01-13 NOTE — TELEPHONE ENCOUNTER
Called Ayla to offer soon appointment today. There is a 2:40, 3:20, or 3:40 appt available. Ayla will check with her ride and call back into the hopeline to let us know

## 2025-01-13 NOTE — PROGRESS NOTES
Hematology/Oncology Outpatient Follow-up  Ayla Nye 74 y.o. female 1950 [id number]    Date:  1/13/2025      Assessment and Plan:  Patient is 74-year-old female with history of non-small cell lung cancer, was initially diagnosed as stage IIb completed concurrent chemoradiation with carboplatin/paclitaxel was found to have brain metastasis.  S/p 2 cycles of pemetrexed/carboplatin/pembrolizumab on 11/14/2024.  Therapy is interrupted due to recurrent hospitalizations.  Patient was also found to have superficial perianal/gluteal mass biopsy showed squamous cell carcinoma s/p right gluteus radiation therapy completed on 10/18/2024.    1. Malignant neoplasm of upper lobe, right bronchus or lung (HCC) (Primary)  2. 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  She is also scheduled to get SRT on 1/16/2025.  Her case was discussed in neuro-oncology board and recommendations were for Avastin due to concerns of radiation induced vasogenic edema as she is not responding to steroids.  Patient has appointment on 1/27/2025, encouraged to keep appointment and will be seen in office postradiation therapy.  Plan is to start her on low-dose Avastin and resume carboplatin and pemetrexed.  Will continue to hold on pembrolizumab as patient continues to be on steroids.  She had developed new onset midthoracic back pain that is radiating bilaterally, concerns of thoracic mets, will repeat CT chest with contrast.    - CT chest w contrast; Future    3. Essential thrombocytosis (HCC)  Patient was treated with Hydrea 500 capsule in the past, Hydrea has been held since May 2024 due to chemotherapy induced drop in platelet counts.  Recent labs from 1/1/2025 reviewed, platelet count 318K.    HPI:  Ayla Nye is a 74 y.o. female with medical hx remarkable of HTN, HLD, Nodular Goiter, Osteoprosis, Psoriasis, essential thrombocytosis, Pulmonary  Embolism, lung adenocarcinoma as outlined below, and had superficial perianal mass biopsy showed squamous cell carcinoma s/p R gluteus radiation therapy completed 10/18/2024 .      history of [id number] (IIIB) lung adenocarcinoma of the right upper lobe, s/p concurrent chemoRT completing on 7/5/24. She completed a course of SRS on 8/8/24 after MRI brain demonstrated five supra- and infratentorial enhancing lesions compatible with metastases. She also completed a course of palliative radiation to right gluteus (for superficial squamous cell lesion) on 10/18/24.      On treatment with Carboplatin AUC 5 Pemetrexed 500 mg/m2 and Pembrolizumab 200 mg IV every 3 weeks x 4 cycles. Cycle 2 was completed on 11/14/24.  Unfortunately her therapy has been interrupted due to frequent hospitalizations.  Oncology History   Malignant neoplasm of upper lobe, right bronchus or lung (HCC)   2024 Initial Diagnosis    Primary lung adenocarcinoma, right (HCC)     3/26/2024 Biopsy    A-C. Lymph Node, Level 4R :    - Metastatic non-small cell carcinoma, most compatible with a primary lung adenocarcinoma; see note.       D-F. Lymph Node, Level 10R:    - Metastatic non-small cell carcinoma; see note.       G-I. Lymph Node, Level 4L:    - Metastatic non-small cell carcinoma; see note.       4/15/2024 -  Cancer Staged    Staging form: Lung, AJCC 8th Edition  - Clinical stage from 4/15/2024: Stage IIIB (cT2b, cN3, cM0) - Signed by Rogelio Beebe DO on 4/15/2024       5/23/2024 - 7/2/2024 Chemotherapy    alteplase (CATHFLO), 2 mg, Intracatheter, Every 1 Minute as needed, 6 of 6 cycles  CARBOplatin (PARAPLATIN) IVPB (GOG AUC DOSING), 130.4 mg, Intravenous, Once, 6 of 6 cycles  Administration: 130.4 mg (5/23/2024), 144.8 mg (5/30/2024), 138.4 mg (6/6/2024), 144.8 mg (6/13/2024), 132 mg (6/21/2024), 143.6 mg (7/2/2024)  PACLItaxel (TAXOL) chemo IVPB, 45 mg/m2 = 67.2 mg (90 % of original dose 50 mg/m2), Intravenous, Once, 6 of 6 cycles  Dose  modification: 45 mg/m2 (original dose 50 mg/m2, Cycle 1, Reason: Anticipated Tolerance)  Administration: 67.2 mg (5/23/2024), 67.2 mg (5/30/2024), 67.2 mg (6/6/2024), 67.2 mg (6/13/2024), 67.2 mg (6/21/2024), 67.2 mg (7/2/2024)     5/23/2024 - 7/5/2024 Radiation    Treatment:  Course: C1    Plan ID Energy Fractions Dose per Fraction (cGy) Dose Correction (cGy) Total Dose Delivered (cGy) Elapsed Days   R Lung_Hilum 6X 30 / 30 200 0 6,000 43      Treatment dates:  C1: 5/23/2024 - 7/5/2024 8/8/2024 - 8/8/2024 Radiation    SRS 5 PTVs   2000 cGy     9/12/2024 Biopsy    Mass, Superficial perianal mass:  - Squamous cell carcinoma.     Note: The patient's prior lung EBUS sampling shows the tumor to stain for TTF1 and Napsin with absent p40 expression.  The current perianal mass sampling shows diffuse p40 expression with absent TTF1 expression.  This may represent a primary anal/perianal squamous cell carcinoma versus a possible metastatic combined lung tumor (adenocarcinoma and previously unsampled squamous component).  Suggest clinical correlation and appropriate follow-up.         9/23/2024 -  Cancer Staged    Staging form: Lung, AJCC 8th Edition  - Clinical: Stage IV (cM1) - Signed by Honey Gamboa MD on 9/23/2024       10/1/2024 - 10/18/2024 Radiation    Course: C3    Plan ID Energy Fractions Dose per Fraction (cGy) Dose Correction (cGy) Total Dose Delivered (cGy) Elapsed Days   R Gluteus:1 10X/6X 14 / 15 300 0 4,200 17      Treatment Dates:  10/1/2024 - 10/18/2024.       10/7/2024 -  Chemotherapy    cyanocobalamin, 1,000 mcg, Intramuscular, Once, 1 of 3 cycles  Administration: 1,000 mcg (8/19/2024)  alteplase (CATHFLO), 2 mg, Intracatheter, Every 1 Minute as needed, 2 of 6 cycles  palonosetron (ALOXI), 0.25 mg, Intravenous, Once, 0 of 4 cycles  fosaprepitant (EMEND) IVPB, 150 mg, Intravenous, Once, 2 of 6 cycles  Administration: 150 mg (10/7/2024), 150 mg (11/14/2024)  CARBOplatin (PARAPLATIN) IVPB ([facility] AUC  DOSING), 347 mg, Intravenous, Once, 2 of 6 cycles  Administration: 347 mg (10/7/2024), 346 mg (11/14/2024)  pemetrexed (ALIMTA) chemo infusion, 735 mg, Intravenous, Once, 2 of 6 cycles  Administration: 700 mg (10/7/2024), 700 mg (11/14/2024)  pembrolizumab (KEYTRUDA) IVPB, 200 mg, Intravenous, Once, 2 of 6 cycles  Administration: 200 mg (11/14/2024), 200 mg (10/7/2024)     Metastatic cancer to brain (HCC)   7/29/2024 Initial Diagnosis    Metastatic cancer to brain (HCC)     8/8/2024 - 8/8/2024 Radiation    SRS 5 PTVs   2000 cGy     9/12/2024 Biopsy    Mass, Superficial perianal mass:  - Squamous cell carcinoma.     Note: The patient's prior lung EBUS sampling shows the tumor to stain for TTF1 and Napsin with absent p40 expression.  The current perianal mass sampling shows diffuse p40 expression with absent TTF1 expression.  This may represent a primary anal/perianal squamous cell carcinoma versus a possible metastatic combined lung tumor (adenocarcinoma and previously unsampled squamous component).  Suggest clinical correlation and appropriate follow-up.         10/1/2024 - 10/18/2024 Radiation    Course: C3    Plan ID Energy Fractions Dose per Fraction (cGy) Dose Correction (cGy) Total Dose Delivered (cGy) Elapsed Days   R Gluteus:1 10X/6X 14 / 15 300 0 4,200 17      Treatment Dates:  10/1/2024 - 10/18/2024.           Interval history:  Patient is seen for 1 week follow-up.  She reports new onset of midthoracic pain radiating bilaterally, that increases more with cough but also experiences when she is sitting.  She was seen by radiation oncology and is scheduled to get XRT on 1/16/2025.  Patient is concerned about her symptoms getting worse as she still has vasogenic edema from her last radiation therapy.    ROS: Review of Systems   Constitutional:  Positive for fatigue. Negative for appetite change, diaphoresis and fever.   Respiratory:  Negative for chest tightness and shortness of breath.    Cardiovascular:   Negative for chest pain and palpitations.   Gastrointestinal:  Negative for abdominal pain, blood in stool and diarrhea.   Musculoskeletal:  Positive for back pain and myalgias. Negative for arthralgias.   All other systems reviewed and are negative.      Past Medical History:   Diagnosis Date    Allergic 2022    Allergic to neomiacin and cephalexin    Cancer (HCC)     lung cancer    Cancer (HCC)     mets to brain    Cancer (HCC)     perianal mass    Cataract Nov. 2023    Due to have durgery June 2024    Essential thrombocytosis (HCC)     controlled with medication    Hyperlipidemia     Hypertension     Mass in chest     Nodular goiter     Osteoporosis     Pneumonia Oct 16, 2023    Also  Feb 2024    Psoriasis     SIRS (systemic inflammatory response syndrome) (HCC) 06/22/2024       Past Surgical History:   Procedure Laterality Date    APPENDECTOMY      BREAST CYST EXCISION Right 2009    COLONOSCOPY  10/31/2018    DXA PROCEDURE (HISTORICAL)  04/21/2017    IR BIOPSY OTHER  9/12/2024    IR LUMBAR PUNCTURE  9/12/2024    MAMMO (HISTORICAL)      8-24-18    MAMMO NEEDLE LOCALIZATION RIGHT (ALL INC) Right 07/13/2009    SKIN LESION EXCISION      bridge of nose       Social History     Socioeconomic History    Marital status:      Spouse name: None    Number of children: None    Years of education: None    Highest education level: None   Occupational History    None   Tobacco Use    Smoking status: Former     Current packs/day: 1.00     Average packs/day: 1 pack/day for 59.0 years (59.0 ttl pk-yrs)     Types: Cigarettes     Start date: 1966     Passive exposure: Past    Smokeless tobacco: Never    Tobacco comments:     Quit 2010   Vaping Use    Vaping status: Never Used   Substance and Sexual Activity    Alcohol use: Yes     Alcohol/week: 5.0 standard drinks of alcohol     Types: 5 Glasses of wine per week    Drug use: Never    Sexual activity: Not Currently     Partners: Male     Birth control/protection:  Post-menopausal   Other Topics Concern    None   Social History Narrative    None     Social Drivers of Health     Financial Resource Strain: Low Risk  (2023)    Overall Financial Resource Strain (CARDIA)     Difficulty of Paying Living Expenses: Not hard at all   Food Insecurity: No Food Insecurity (2024)    Nursing - Inadequate Food Risk Classification     Worried About Running Out of Food in the Last Year: Never true     Ran Out of Food in the Last Year: Never true     Ran Out of Food in the Last Year: Never true   Transportation Needs: No Transportation Needs (2024)    Nursing - Transportation Risk Classification     Lack of Transportation: Not on file     Lack of Transportation: No   Physical Activity: Not on file   Stress: Not on file   Social Connections: Not on file   Intimate Partner Violence: Unknown (2024)    Nursing IPS     Feels Physically and Emotionally Safe: Not on file     Physically Hurt by Someone: Not on file     Humiliated or Emotionally Abused by Someone: Not on file     Physically Hurt by Someone: No     Hurt or Threatened by Someone: No   Housing Stability: Unknown (2024)    Nursing: Inadequate Housing Risk Classification     Has Housing: Not on file     Worried About Losing Housing: Not on file     Unable to Get Utilities: Not on file     Unable to Pay for Housing in the Last Year: No     Has Housin       Family History   Problem Relation Age of Onset    Stroke Mother     Depression Mother             Hypertension Mother     Hearing loss Mother     Tuberculosis Father     Cancer Brother         lung    Tuberculosis Brother     Coronary artery disease Brother     Hypertension Brother     No Known Problems Daughter     No Known Problems Daughter     No Known Problems Maternal Grandmother     No Known Problems Maternal Grandfather     No Known Problems Paternal Grandmother     No Known Problems Paternal Grandfather     No Known Problems Maternal Aunt      No Known Problems Maternal Aunt     No Known Problems Maternal Aunt     No Known Problems Maternal Aunt     No Known Problems Paternal Aunt     Cancer Brother         Throat canver       Allergies   Allergen Reactions    Doxycycline Headache    Keflex [Cephalexin] Rash    Neomycin-Polymyxin-Dexameth Eye Swelling         Current Outpatient Medications:     dexamethasone (DECADRON) 4 mg tablet, Take 1 tablet (4 mg total) by mouth every 8 (eight) hours for 5 days, THEN 1 tablet (4 mg total) every 12 (twelve) hours for 7 days, THEN 1.5 tablets (6 mg total) in the morning for 7 days, THEN 0.5 tablets (2 mg total) 2 (two) times a day for 7 days, THEN 0.5 tablets (2 mg total) in the morning for 7 days. When you get to the 6 mg dosage please take 4 mg in the morning followed by 2 mg in the evening; that would be 1 tablet in the morning followed by 1/2 tablet in the evening for that week.., Disp: 50 tablet, Rfl: 0    folic acid (FOLVITE) 1 mg tablet, Take 1 tablet (1 mg total) by mouth daily, Disp: 30 tablet, Rfl: 6    gabapentin (NEURONTIN) 100 mg capsule, TAKE 1 CAPSULE BY MOUTH EVERY MORNING, 1 CAPSULE EVERY AFTERNOON AND 3 CAPSULES EVERY NIGHT AT BEDTIME, Disp: 150 capsule, Rfl: 5    levETIRAcetam (KEPPRA) 1000 MG tablet, Take 1 tablet (1,000 mg total) by mouth every 12 (twelve) hours, Disp: 60 tablet, Rfl: 0    levothyroxine 50 mcg tablet, Take 1 tablet (50 mcg total) by mouth daily in the early morning, Disp: 30 tablet, Rfl: 0    pantoprazole (PROTONIX) 40 mg tablet, Take 1 tablet (40 mg total) by mouth daily in the early morning, Disp: 30 tablet, Rfl: 5    Probiotic Product (PROBIOTIC DAILY PO), Take 1 capsule by mouth daily ON HOLD, Disp: , Rfl:     rivaroxaban (Xarelto) 20 mg tablet, Take 1 tablet (20 mg total) by mouth daily with breakfast, Disp: 30 tablet, Rfl: 0    senna (SENOKOT) 8.6 MG tablet, Take 1 tablet by mouth daily Takes one every other day, Disp: , Rfl:     sulfamethoxazole-trimethoprim (BACTRIM DS)  "800-160 mg per tablet, Take 1 tablet by mouth daily Starting 12/24, you can take one tablet Monday, Wednesday, and Fridays. Do not start before December 24, 2024., Disp: 36 tablet, Rfl: 4    vitamin B-12 (VITAMIN B-12) 1,000 mcg tablet, Take 1 tablet (1,000 mcg total) by mouth daily, Disp: 90 tablet, Rfl: 1    amLODIPine (NORVASC) 5 mg tablet, Take 1 tablet (5 mg total) by mouth daily, Disp: 30 tablet, Rfl: 0      Physical Exam:  /70   Pulse 85   Temp 97.9 °F (36.6 °C)   Ht 5' 2\" (1.575 m)   Wt 56 kg (123 lb 8 oz)   SpO2 94%   BMI 22.59 kg/m²     Physical Exam  Constitutional:       Appearance: Normal appearance.   HENT:      Head: Normocephalic and atraumatic.      Mouth/Throat:      Mouth: Mucous membranes are moist.      Pharynx: Oropharynx is clear.   Eyes:      Conjunctiva/sclera: Conjunctivae normal.      Pupils: Pupils are equal, round, and reactive to light.   Cardiovascular:      Rate and Rhythm: Normal rate and regular rhythm.      Pulses: Normal pulses.      Heart sounds: Normal heart sounds.   Pulmonary:      Comments: Decreased breath sounds bilaterally but no rhonchi or wheezes.  Abdominal:      General: Abdomen is flat. Bowel sounds are normal.   Neurological:      General: No focal deficit present.      Mental Status: She is alert and oriented to person, place, and time.       Labs:   Contains abnormal data CBC and differential              Component  Ref Range & Units (hover) 1/1/25  5:27 AM 12/31/24  5:53 AM 12/30/24  2:42 PM 12/23/24  8:41 AM 12/6/24  5:06 AM 12/6/24  5:06 AM 12/5/24  6:38 AM   WBC 6.90 5.13 8.11 8.85  7.92 6.85   RBC 3.17 Low  2.84 Low  3.34 Low  3.06 Low   2.53 Low  2.47 Low    Hemoglobin 9.5 Low  8.6 Low  10.2 Low  9.6 Low   7.8 Low  7.7 Low    Hematocrit 31.1 Low  27.8 Low  32.9 Low  30.4 Low   25.2 Low  24.3 Low    MCV 98 98 99 High  99 High   100 High  98   MCH 30.0 30.3 30.5 31.4  30.8 31.2   MCHC 30.5 Low  30.9 Low  31.0 Low  31.6  31.0 Low  31.7   RDW 15.2 " High  15.7 High  15.7 High  16.0 High   15.1 15.1   MPV 8.8 Low  8.3 Low  8.5 Low  8.9  9.0 9.0   Platelets 318 216 294 246  371 332   nRBC 0  0 0   0 CM   Segmented % 88 High   89 High  87 High  85 High      Immature Grans % 2  2 2      Lymphocytes % 3 Low   3 Low  3 Low  1 Low      Monocytes % 6  5 7 7     Eosinophils Relative 0  0 1 0     Basophils Relative 1  1 0 0     Absolute Neutrophils 6.14  7.29 7.74 High  6.97 R     Absolute Immature Grans 0.11  0.17 0.16      Absolute Lymphocytes 0.23 Low   0.20 Low  0.26 Low  0.08 Low  R     Absolute Monocytes 0.38  0.37 0.61 0.55 R     Eosinophils Absolute 0.00  0.03 0.05 0.00 R     Basophils Absolute 0.04  0.05 0.03 0.00 R     Bands %     3 R     Metamyelocytes %     2 High  R     Myelocytes %     2 High  R     Absolute Metamyelocytes     0.16 High  R     Absolute Myelocytes     0.16 High  R     Total Counted          RBC Morphology     Present     Platelet Estimate     Adequate R     Polychromasia     Present           Contains abnormal data Basic metabolic panel            Component  Ref Range & Units (hover) 1/1/25  5:27 AM 12/31/24  5:53 AM 12/30/24  2:42 PM 12/23/24  8:41 AM 12/6/24  5:06 AM 12/5/24  6:38 AM 12/4/24  5:14 AM   Sodium 138 139 140 139 142 142 142   Potassium 4.6 3.8 4.2 3.7 3.8 3.7 4.3   Chloride 105 108 105 103 105 106 101   CO2 27 24 26 28 27 29 28   ANION GAP 6 7 9 8 10 7 13   BUN 17 13 20 17 24 21 18   Creatinine 0.99 0.91 CM 1.14 CM 1.00 CM 0.75 CM 0.72 CM 0.82 CM   Comment: Standardized to IDMS reference method   Glucose 127 81  CM 86 CM 94 CM 75  CM   Comment: If the patient is fasting, the ADA then defines impaired fasting glucose as > 100 mg/dL and diabetes as > or equal to 123 mg/dL.   Glucose, Fasting 127 High  81        Calcium 9.4 8.5 9.3 9.0 9.0 9.1 9.7   eGFR 56 62 47 55 78 82 70          Patient voiced understanding and agreement in the above discussion. Aware to contact our office with questions/symptoms in the interim.      This note has been generated by voice recognition software system.  Therefore, there may be spelling, grammar, and or syntax errors. Please contact if questions arise.

## 2025-01-14 ENCOUNTER — HOSPITAL ENCOUNTER (OUTPATIENT)
Dept: RADIOLOGY | Facility: HOSPITAL | Age: 75
Discharge: HOME | End: 2025-01-14
Payer: MEDICARE

## 2025-01-14 ENCOUNTER — TELEPHONE (OUTPATIENT)
Dept: HEMATOLOGY ONCOLOGY | Facility: CLINIC | Age: 75
End: 2025-01-14

## 2025-01-14 ENCOUNTER — OFFICE VISIT (OUTPATIENT)
Age: 75
End: 2025-01-14
Payer: MEDICARE

## 2025-01-14 VITALS
SYSTOLIC BLOOD PRESSURE: 122 MMHG | DIASTOLIC BLOOD PRESSURE: 78 MMHG | BODY MASS INDEX: 22.49 KG/M2 | RESPIRATION RATE: 18 BRPM | TEMPERATURE: 98.2 F | HEIGHT: 62 IN | OXYGEN SATURATION: 99 % | WEIGHT: 122.2 LBS | HEART RATE: 73 BPM

## 2025-01-14 DIAGNOSIS — C34.11 MALIGNANT NEOPLASM OF UPPER LOBE, RIGHT BRONCHUS OR LUNG (HCC): ICD-10-CM

## 2025-01-14 DIAGNOSIS — E78.2 MIXED HYPERLIPIDEMIA: ICD-10-CM

## 2025-01-14 DIAGNOSIS — R06.09 DYSPNEA ON EXERTION: ICD-10-CM

## 2025-01-14 DIAGNOSIS — D80.3 IGG DEFICIENCY (HCC): ICD-10-CM

## 2025-01-14 DIAGNOSIS — I10 PRIMARY HYPERTENSION: Primary | ICD-10-CM

## 2025-01-14 DIAGNOSIS — E44.0 MODERATE PROTEIN-CALORIE MALNUTRITION (HCC): ICD-10-CM

## 2025-01-14 DIAGNOSIS — C79.31 METASTATIC CANCER TO BRAIN (HCC): ICD-10-CM

## 2025-01-14 DIAGNOSIS — L40.50 PSORIATIC ARTHRITIS (HCC): ICD-10-CM

## 2025-01-14 DIAGNOSIS — N18.32 CHRONIC KIDNEY DISEASE, STAGE 3B (HCC): ICD-10-CM

## 2025-01-14 DIAGNOSIS — E03.9 ACQUIRED HYPOTHYROIDISM: ICD-10-CM

## 2025-01-14 PROBLEM — I26.99 ACUTE PULMONARY EMBOLISM (HCC): Status: RESOLVED | Noted: 2024-11-25 | Resolved: 2025-01-14

## 2025-01-14 PROBLEM — U07.1 COVID-19: Status: RESOLVED | Noted: 2024-11-02 | Resolved: 2025-01-14

## 2025-01-14 PROBLEM — D70.9 NEUTROPENIA (HCC): Status: RESOLVED | Noted: 2024-11-25 | Resolved: 2025-01-14

## 2025-01-14 PROBLEM — J96.01 ACUTE HYPOXIC RESPIRATORY FAILURE (HCC): Status: RESOLVED | Noted: 2024-11-29 | Resolved: 2025-01-14

## 2025-01-14 LAB
MYCOBACTERIUM SPEC CULT: NORMAL
RHODAMINE-AURAMINE STN SPEC: NORMAL

## 2025-01-14 PROCEDURE — 99495 TRANSJ CARE MGMT MOD F2F 14D: CPT

## 2025-01-14 PROCEDURE — 71260 CT THORAX DX C+: CPT

## 2025-01-14 RX ORDER — LEVOTHYROXINE SODIUM 50 UG/1
TABLET ORAL
Qty: 30 TABLET | Refills: 1 | Status: ON HOLD | OUTPATIENT
Start: 2025-01-14

## 2025-01-14 RX ORDER — LEVETIRACETAM 1000 MG/1
TABLET ORAL
Qty: 60 TABLET | Refills: 1 | Status: ON HOLD | OUTPATIENT
Start: 2025-01-14

## 2025-01-14 RX ADMIN — IOHEXOL 85 ML: 350 INJECTION, SOLUTION INTRAVENOUS at 14:04

## 2025-01-14 NOTE — ASSESSMENT & PLAN NOTE
Lab Results   Component Value Date    EGFR 56 01/01/2025    EGFR 62 12/31/2024    EGFR 47 12/30/2024    CREATININE 0.99 01/01/2025    CREATININE 0.91 12/31/2024    CREATININE 1.14 12/30/2024   creatinine and GFR stable   Will need periodic BMP  Avoid NSAIDs like ibuprofen Aleve Advil etc.  Avoid high potassium diet.  We will continue to monitor

## 2025-01-14 NOTE — ASSESSMENT & PLAN NOTE
Stable at present time patient is scheduled for CT scan of the chest and thorax today.  Continue current treatment.  Will continue to monitor

## 2025-01-14 NOTE — TELEPHONE ENCOUNTER
Dr. Castro saw patient in office yesterday. Keytruda removed from plan and Avastin 5mg/kg ordered instead. Carbo dose updated to Target AUC 4 from Target AUC of 5.     Infusion: Please schedule patient to resume treatment on 1/23

## 2025-01-14 NOTE — TELEPHONE ENCOUNTER
Called and spoke to Ayla. She is nervous about restarting treatment with the new medication, Avastin, added in after not having received chemo for a few months. She requests to get treated on 1/23 with only the carboplatin and pemetrexed to see how she does before adding the Avastin in. I will verify with Dr. Castro that this is okay and send her a message on [employer]. Ayla verbalized understanding.

## 2025-01-14 NOTE — PROGRESS NOTES
Transition of Care Visit  Name: Ayla Nye      : 1950      MRN: [id number]  Encounter Provider: Rafy Angelo MD  Encounter Date: 2025   Encounter department: [facility]    Assessment & Plan  Primary hypertension  Under control.  Continue amlodipine 5 mg po qd  We will continue to monitor       Acquired hypothyroidism  TSH in therapeutic range.  Repeat TSH in about 2 months.  Continue same dose of levothyroxine.  We will continue to monitor         Dyspnea on exertion  Stable at present time patient is scheduled for CT scan of the chest and thorax today.  Continue current treatment.  Will continue to monitor       Psoriatic arthritis (HCC)  Under control.    Continue current medication.    We will re-evaluate at next office visit.  Patient has been followed by a dermatologist and rheumatologist       Mixed hyperlipidemia  Under control without medication we will continue to monitor         Moderate protein-calorie malnutrition (HCC)  Weight remaining stable.  We will continue to monitor       IgG deficiency (HCC)  Has been followed by an oncologist       Chronic kidney disease, stage 3b (HCC)  Lab Results   Component Value Date    EGFR 56 2025    EGFR 62 2024    EGFR 47 2024    CREATININE 0.99 2025    CREATININE 0.91 2024    CREATININE 1.14 2024   creatinine and GFR stable   Will need periodic BMP  Avoid NSAIDs like ibuprofen Aleve Advil etc.  Avoid high potassium diet.  We will continue to monitor          Metastatic cancer to brain (HCC)  Has been followed by oncologist.       Malignant neoplasm of upper lobe, right bronchus or lung (HCC)  Patient has been followed by oncologist.            History of Present Illness     Transitional Care Management Review:   Ayla Nye is a 74 y.o. female here for TCM follow up.     During the TCM phone call patient stated:  TCM Call     Date and time call was made  1/3/2025  4:18 PM     Hospital care reviewed  Records reviewed    Patient was hospitialized at  [facility]    Date of Admission  12/30/24    Date of discharge  01/02/25    Diagnosis  Lung cancer metastatic to brain    Disposition  Home    Were the patients medications reviewed and updated  Yes    Current Symptoms  None    Left side leg pain severity  Moderat    Weakness severity  Moderate    Fatigue severity  Mild      TCM Call     Post hospital issues  None    Should patient be enrolled in anticoag monitoring?  No    Scheduled for follow up?  Yes    Referrals needed  Left message for patient to return call to office to schedule a TCM appointment    Did you obtain your prescribed medications  Yes    Do you need help managing your prescriptions or medications  No    Is transportation to your appointment needed  No    I have advised the patient to call PCP with any new or worsening symptoms  Marta Vasquez MA    Living Arrangements  Alone    Support System  Family        Patient is here for transitional care management as patient was hospitalized on December 30, 2024 and discharged on January 2, 2025 for dyspnea on exertion metastatic lung cancer and imbalance patient's medical record reviewed from the hospital patient currently doing okay.  Denies any chest pain.  Does have some exertional shortness of breath.  No fever or chills.  Does have occasional headache.  No lightheadedness or dizziness.  No abdomen pain, nausea, vomiting, diarrhea or constipation.  No bowel or bladder issues.  No other new problem.  As stated records from the hospital reviewed      Review of Systems   Constitutional:  Positive for fatigue. Negative for chills and fever.   HENT:  Negative for congestion, ear pain, rhinorrhea, sneezing and sore throat.    Eyes:  Negative for redness, itching and visual disturbance.   Respiratory:  Negative for cough, chest tightness and shortness of breath.    Cardiovascular:  Negative for chest pain, palpitations and leg  "swelling.   Gastrointestinal:  Negative for abdominal pain, blood in stool, diarrhea, nausea and vomiting.   Endocrine: Negative for cold intolerance and heat intolerance.   Genitourinary:  Negative for dysuria, frequency and urgency.   Musculoskeletal:  Negative for arthralgias, back pain and myalgias.   Skin:  Negative for color change and rash.   Neurological:  Positive for headaches (off and on). Negative for dizziness, weakness, light-headedness and numbness.   Hematological:  Does not bruise/bleed easily.   Psychiatric/Behavioral:  Negative for agitation, behavioral problems and confusion.      Objective   /78 (BP Location: Left arm, Patient Position: Sitting, Cuff Size: Standard)   Pulse 73   Temp 98.2 °F (36.8 °C) (Temporal)   Resp 18   Ht 5' 2\" (1.575 m)   Wt 55.4 kg (122 lb 3.2 oz)   SpO2 99%   BMI 22.35 kg/m²     Physical Exam  Vitals and nursing note reviewed.   Constitutional:       General: She is not in acute distress.     Appearance: Normal appearance. She is well-developed. She is not ill-appearing, toxic-appearing or diaphoretic.   HENT:      Head: Normocephalic and atraumatic.      Right Ear: External ear normal.      Left Ear: External ear normal.      Nose: Nose normal.      Mouth/Throat:      Mouth: Mucous membranes are moist.      Pharynx: Oropharynx is clear. No posterior oropharyngeal erythema.   Eyes:      General: No scleral icterus.        Right eye: No discharge.         Left eye: No discharge.      Extraocular Movements: Extraocular movements intact.      Conjunctiva/sclera: Conjunctivae normal.      Pupils: Pupils are equal, round, and reactive to light.   Cardiovascular:      Rate and Rhythm: Normal rate and regular rhythm.      Pulses: Normal pulses.      Heart sounds: Normal heart sounds. No murmur heard.     No gallop.   Pulmonary:      Effort: Pulmonary effort is normal. No respiratory distress.      Breath sounds: Normal breath sounds. No wheezing or rales. "   Abdominal:      General: Abdomen is flat. Bowel sounds are normal. There is no distension.      Palpations: Abdomen is soft.      Tenderness: There is no abdominal tenderness. There is no right CVA tenderness, left CVA tenderness or guarding.   Musculoskeletal:         General: No swelling or tenderness. Normal range of motion.      Cervical back: Normal range of motion and neck supple. No rigidity.      Right lower leg: No edema.      Left lower leg: No edema.   Lymphadenopathy:      Cervical: No cervical adenopathy.   Skin:     General: Skin is warm and dry.      Capillary Refill: Capillary refill takes 2 to 3 seconds.      Coloration: Skin is not jaundiced or pale.      Findings: No bruising.   Neurological:      General: No focal deficit present.      Mental Status: She is alert and oriented to person, place, and time. Mental status is at baseline.      Sensory: No sensory deficit.      Gait: Gait normal.   Psychiatric:         Mood and Affect: Mood normal.         Behavior: Behavior normal.       Medications have been reviewed by provider in current encounter

## 2025-01-14 NOTE — TELEPHONE ENCOUNTER
Received message from infusion RN that Ayla did not believe she should be starting treatment on 1/23 while Dr. Castro is on vacation.    I called and left a voicemail for Ayla that, per Dr. Castro, she should start treatment on 1/23 prior to Dr. Castro seeing her in the office again on 1/27. Dr. Castro would like to see her after she starts the new treatment so she can assess her response to it. Call back number provided if she has any questions.

## 2025-01-15 ENCOUNTER — TELEPHONE (OUTPATIENT)
Age: 75
End: 2025-01-15

## 2025-01-15 ENCOUNTER — HOSPITAL ENCOUNTER (OUTPATIENT)
Dept: INFUSION CENTER | Facility: HOSPITAL | Age: 75
Discharge: HOME | End: 2025-01-15
Attending: INTERNAL MEDICINE

## 2025-01-15 DIAGNOSIS — C34.11 MALIGNANT NEOPLASM OF UPPER LOBE, RIGHT BRONCHUS OR LUNG (HCC): ICD-10-CM

## 2025-01-15 DIAGNOSIS — C79.31 METASTATIC CANCER TO BRAIN (HCC): ICD-10-CM

## 2025-01-15 DIAGNOSIS — E03.9 HYPOTHYROIDISM, UNSPECIFIED TYPE: ICD-10-CM

## 2025-01-15 DIAGNOSIS — C79.31 MALIGNANT NEOPLASM METASTATIC TO BRAIN (HCC): Primary | ICD-10-CM

## 2025-01-15 DIAGNOSIS — C34.11 MALIGNANT NEOPLASM OF UPPER LOBE, RIGHT BRONCHUS OR LUNG (HCC): Primary | ICD-10-CM

## 2025-01-15 NOTE — TELEPHONE ENCOUNTER
Received message from infusion RN that Ayla did not want to get scheduled for treatment on 1/23 due to Dr. Castro being out of office on vacation.    Called and spoke to Ayla. Dr. Castro would like her to get treated on 1/23 so that she can assess how she tolerated treatment at her office visit on 1/27. Ayla expressed anxiety about starting back on treatment after having been off for a few months and adding the new medication, Avastin, on as well. She asked if it was possible to hold the Avastin for this cycle if she gets treated on 1/23. I spoke to Dr. Castro and she suggested treating Ayla with carboplatin and pemetrexed on 1/23 and then giving her just the Avastin the week after. Ayla was agreeable to this plan. I will update infusion and have them reach out to get her scheduled. Ayla verbalized understanding.

## 2025-01-16 ENCOUNTER — PROCEDURE VISIT (OUTPATIENT)
Dept: NEUROSURGERY | Facility: CLINIC | Age: 75
End: 2025-01-16
Payer: MEDICARE

## 2025-01-16 ENCOUNTER — APPOINTMENT (OUTPATIENT)
Dept: RADIATION ONCOLOGY | Facility: HOSPITAL | Age: 75
End: 2025-01-16
Attending: INTERNAL MEDICINE
Payer: MEDICARE

## 2025-01-16 DIAGNOSIS — C79.31 METASTASIS TO BRAIN (HCC): Primary | ICD-10-CM

## 2025-01-16 DIAGNOSIS — C34.91 NSCLC OF RIGHT LUNG (HCC): ICD-10-CM

## 2025-01-16 PROCEDURE — 77372 SRS LINEAR BASED: CPT | Performed by: INTERNAL MEDICINE

## 2025-01-16 PROCEDURE — 61796 SRS CRANIAL LESION SIMPLE: CPT | Performed by: NEUROLOGICAL SURGERY

## 2025-01-16 PROCEDURE — 77432 STEREOTACTIC RADIATION TRMT: CPT | Performed by: INTERNAL MEDICINE

## 2025-01-16 NOTE — PROGRESS NOTES
PATIENT NAME: Ayla Nye  : 1950  MRN: [id number]  PROCEDURE DATE: 2025     Stereotactic Radiotherapy (SRT) Operative Note     Preop Diagnosis: Brain metastasis     Postop Diagnosis: same     Procedure Details: Frameless Stereotactic Radiotherapy for left frontal metastasis     Surgeon: James Norwood M.D.     Assistants: None, no qualified resident was available to assist with this case     Radiation Oncologist(s): Dr. Honey Gamboa     Estimated Blood Loss:  None           Specimens: None     Drains: None           Total IV Fluids: None              Findings: As above     Complications:  None     Anesthesia: None        DETAILS OF PROCEDURE     The patient presented to the outpatient area of the Department of Radiation Oncology where an open faced immobilization mask was created. The patient then underwent a stereotactic head CT while immobilized in the mask. The patient was then released from the Department.     The patient’s stereotactic CT and previous stereotactic MRI scans were fused in the SRT planning software, which was used to develop the SRT plan.       The SRT prescription called for a dose of 20 Gray to be delivered to the PTV in 1 fraction. The PTV was created by expanding the GTV, as contoured by myself and the Radiation Oncologist. The SRT plan utilized the mini-multileaf columnator on the TrueBeam machine at the [facility].      When the final treatment plan had been developed and approved by myself, the radiation oncologist and physicist, the patient returned to the Department for the frameless SRT treatment.      The patient was positioned on the treatment couch. The Optic Surface Monitoring System (OSMS) was used initially to align the patient. The patient was immobilized in their open faced mask. kV and then cone beam CT imaging was used to align the patient.     Once the radiation oncologist, physicist and I agreed the patient was in correct position, the  fields were treated sequentially without complications. The OSMS was used during the treatment to assure correct positioning of the patient, and if it detected patient motion, interrupted the treatment beam until the patient was back in alignment.     There was no blood loss and no specimen.      After the SRT treatment was delivered, the patient was recovered in the treatment room and discharged from the Department when neurologically at baseline and stable.        SIGNATURE: James Norwood M.D.

## 2025-01-18 ENCOUNTER — APPOINTMENT (OUTPATIENT)
Dept: LAB | Facility: CLINIC | Age: 75
End: 2025-01-18
Payer: MEDICARE

## 2025-01-18 DIAGNOSIS — C34.11 MALIGNANT NEOPLASM OF UPPER LOBE, RIGHT BRONCHUS OR LUNG (HCC): ICD-10-CM

## 2025-01-18 DIAGNOSIS — C79.31 MALIGNANT NEOPLASM METASTATIC TO BRAIN (HCC): ICD-10-CM

## 2025-01-18 DIAGNOSIS — I10 PRIMARY HYPERTENSION: ICD-10-CM

## 2025-01-18 DIAGNOSIS — C79.31 METASTATIC CANCER TO BRAIN (HCC): ICD-10-CM

## 2025-01-18 DIAGNOSIS — E03.9 HYPOTHYROIDISM, UNSPECIFIED TYPE: ICD-10-CM

## 2025-01-18 LAB
ALBUMIN SERPL BCG-MCNC: 3.7 G/DL (ref 3.5–5)
ALP SERPL-CCNC: 38 U/L (ref 34–104)
ALT SERPL W P-5'-P-CCNC: 9 U/L (ref 7–52)
ANION GAP SERPL CALCULATED.3IONS-SCNC: 8 MMOL/L (ref 4–13)
ANISOCYTOSIS BLD QL SMEAR: PRESENT
AST SERPL W P-5'-P-CCNC: 10 U/L (ref 13–39)
BASOPHILS # BLD MANUAL: 0.12 THOUSAND/UL (ref 0–0.1)
BASOPHILS NFR MAR MANUAL: 1 % (ref 0–1)
BILIRUB SERPL-MCNC: 0.31 MG/DL (ref 0.2–1)
BUN SERPL-MCNC: 21 MG/DL (ref 5–25)
CALCIUM SERPL-MCNC: 9 MG/DL (ref 8.4–10.2)
CHLORIDE SERPL-SCNC: 105 MMOL/L (ref 96–108)
CO2 SERPL-SCNC: 26 MMOL/L (ref 21–32)
CREAT SERPL-MCNC: 0.97 MG/DL (ref 0.6–1.3)
CRP SERPL QL: 9.8 MG/L
EOSINOPHIL # BLD MANUAL: 0 THOUSAND/UL (ref 0–0.4)
EOSINOPHIL NFR BLD MANUAL: 0 % (ref 0–6)
ERYTHROCYTE [DISTWIDTH] IN BLOOD BY AUTOMATED COUNT: 15.9 % (ref 11.6–15.1)
ERYTHROCYTE [SEDIMENTATION RATE] IN BLOOD: 57 MM/HOUR (ref 0–29)
GFR SERPL CREATININE-BSD FRML MDRD: 57 ML/MIN/1.73SQ M
GLUCOSE SERPL-MCNC: 87 MG/DL (ref 65–140)
HCT VFR BLD AUTO: 32.9 % (ref 34.8–46.1)
HGB BLD-MCNC: 10.2 G/DL (ref 11.5–15.4)
LDH SERPL-CCNC: 323 U/L (ref 140–271)
LIPASE SERPL-CCNC: 38 U/L (ref 11–82)
LYMPHOCYTES # BLD AUTO: 0.36 THOUSAND/UL (ref 0.6–4.47)
LYMPHOCYTES # BLD AUTO: 3 % (ref 14–44)
MCH RBC QN AUTO: 30.4 PG (ref 26.8–34.3)
MCHC RBC AUTO-ENTMCNC: 31 G/DL (ref 31.4–37.4)
MCV RBC AUTO: 98 FL (ref 82–98)
MONOCYTES # BLD AUTO: 0 THOUSAND/UL (ref 0–1.22)
MONOCYTES NFR BLD: 0 % (ref 4–12)
MYELOCYTE ABSOLUTE CT: 0.24 THOUSAND/UL (ref 0–0.1)
MYELOCYTES NFR BLD MANUAL: 2 % (ref 0–1)
NEUTROPHILS # BLD MANUAL: 11.41 THOUSAND/UL (ref 1.85–7.62)
NEUTS SEG NFR BLD AUTO: 94 % (ref 43–75)
PLATELET # BLD AUTO: 289 THOUSANDS/UL (ref 149–390)
PLATELET BLD QL SMEAR: ADEQUATE
PLATELET CLUMP BLD QL SMEAR: PRESENT
PMV BLD AUTO: 9.3 FL (ref 8.9–12.7)
POTASSIUM SERPL-SCNC: 4.2 MMOL/L (ref 3.5–5.3)
PROT SERPL-MCNC: 6.4 G/DL (ref 6.4–8.4)
RBC # BLD AUTO: 3.36 MILLION/UL (ref 3.81–5.12)
RBC MORPH BLD: PRESENT
SODIUM SERPL-SCNC: 139 MMOL/L (ref 135–147)
T3FREE SERPL-MCNC: 2.16 PG/ML (ref 2.5–3.9)
TSH SERPL DL<=0.05 MIU/L-ACNC: 1.19 UIU/ML (ref 0.45–4.5)
WBC # BLD AUTO: 12.14 THOUSAND/UL (ref 4.31–10.16)

## 2025-01-18 PROCEDURE — 86140 C-REACTIVE PROTEIN: CPT

## 2025-01-18 PROCEDURE — 83615 LACTATE (LD) (LDH) ENZYME: CPT

## 2025-01-18 PROCEDURE — 82024 ASSAY OF ACTH: CPT

## 2025-01-18 PROCEDURE — 85007 BL SMEAR W/DIFF WBC COUNT: CPT

## 2025-01-18 PROCEDURE — 84443 ASSAY THYROID STIM HORMONE: CPT

## 2025-01-18 PROCEDURE — 83690 ASSAY OF LIPASE: CPT

## 2025-01-18 PROCEDURE — 84481 FREE ASSAY (FT-3): CPT

## 2025-01-18 PROCEDURE — 85652 RBC SED RATE AUTOMATED: CPT

## 2025-01-18 PROCEDURE — 36415 COLL VENOUS BLD VENIPUNCTURE: CPT

## 2025-01-18 PROCEDURE — 80053 COMPREHEN METABOLIC PANEL: CPT

## 2025-01-18 PROCEDURE — 85027 COMPLETE CBC AUTOMATED: CPT

## 2025-01-20 RX ORDER — AMLODIPINE BESYLATE 5 MG/1
5 TABLET ORAL DAILY
Qty: 30 TABLET | Refills: 5 | Status: ON HOLD | OUTPATIENT
Start: 2025-01-20

## 2025-01-21 LAB — ACTH PLAS-MCNC: <1.5 PG/ML (ref 7.2–63.3)

## 2025-01-22 RX ORDER — SODIUM CHLORIDE 9 MG/ML
20 INJECTION, SOLUTION INTRAVENOUS ONCE
OUTPATIENT
Start: 2025-01-27

## 2025-01-23 ENCOUNTER — HOSPITAL ENCOUNTER (INPATIENT)
Facility: HOSPITAL | Age: 75
LOS: 4 days | Discharge: HOME | DRG: 181 | End: 2025-01-28
Attending: EMERGENCY MEDICINE | Admitting: INTERNAL MEDICINE
Payer: MEDICARE

## 2025-01-23 ENCOUNTER — OFFICE VISIT (OUTPATIENT)
Age: 75
End: 2025-01-23
Payer: MEDICARE

## 2025-01-23 ENCOUNTER — TELEPHONE (OUTPATIENT)
Age: 75
End: 2025-01-23

## 2025-01-23 ENCOUNTER — HOSPITAL ENCOUNTER (OUTPATIENT)
Dept: INFUSION CENTER | Facility: HOSPITAL | Age: 75
Discharge: HOME | End: 2025-01-23
Payer: MEDICARE

## 2025-01-23 ENCOUNTER — APPOINTMENT (EMERGENCY)
Dept: CT IMAGING | Facility: HOSPITAL | Age: 75
DRG: 181 | End: 2025-01-23
Payer: MEDICARE

## 2025-01-23 VITALS
HEART RATE: 80 BPM | OXYGEN SATURATION: 97 % | WEIGHT: 124.6 LBS | SYSTOLIC BLOOD PRESSURE: 148 MMHG | BODY MASS INDEX: 22.93 KG/M2 | RESPIRATION RATE: 16 BRPM | DIASTOLIC BLOOD PRESSURE: 82 MMHG | HEIGHT: 62 IN | TEMPERATURE: 97.7 F

## 2025-01-23 VITALS — BODY MASS INDEX: 22.76 KG/M2 | WEIGHT: 123.68 LBS | HEIGHT: 62 IN

## 2025-01-23 DIAGNOSIS — D72.825 BANDEMIA: ICD-10-CM

## 2025-01-23 DIAGNOSIS — R19.00 ABDOMINAL MASS: ICD-10-CM

## 2025-01-23 DIAGNOSIS — R65.20 SEVERE SEPSIS (HCC): Primary | ICD-10-CM

## 2025-01-23 DIAGNOSIS — A41.9 SEVERE SEPSIS (HCC): Primary | ICD-10-CM

## 2025-01-23 DIAGNOSIS — R91.8 MASS OF UPPER LOBE OF RIGHT LUNG: ICD-10-CM

## 2025-01-23 DIAGNOSIS — C34.90 LUNG CANCER METASTATIC TO BRAIN (HCC): ICD-10-CM

## 2025-01-23 DIAGNOSIS — N28.89 RIGHT KIDNEY MASS: ICD-10-CM

## 2025-01-23 DIAGNOSIS — C79.31 LUNG CANCER METASTATIC TO BRAIN (HCC): ICD-10-CM

## 2025-01-23 DIAGNOSIS — C34.11 MALIGNANT NEOPLASM OF UPPER LOBE, RIGHT BRONCHUS OR LUNG (HCC): Primary | ICD-10-CM

## 2025-01-23 DIAGNOSIS — Z51.5 PALLIATIVE CARE PATIENT: Primary | ICD-10-CM

## 2025-01-23 DIAGNOSIS — G89.3 CANCER ASSOCIATED PAIN: ICD-10-CM

## 2025-01-23 DIAGNOSIS — R91.8 LUNG NODULES: ICD-10-CM

## 2025-01-23 DIAGNOSIS — C34.90 LUNG CANCER (HCC): ICD-10-CM

## 2025-01-23 DIAGNOSIS — D72.819 LEUKOPENIA: ICD-10-CM

## 2025-01-23 LAB
ALBUMIN SERPL BCG-MCNC: 3.7 G/DL (ref 3.5–5)
ALP SERPL-CCNC: 52 U/L (ref 34–104)
ALT SERPL W P-5'-P-CCNC: 15 U/L (ref 7–52)
ANION GAP SERPL CALCULATED.3IONS-SCNC: 11 MMOL/L (ref 4–13)
ANISOCYTOSIS BLD QL SMEAR: PRESENT
APTT PPP: 24 SECONDS (ref 23–34)
AST SERPL W P-5'-P-CCNC: 19 U/L (ref 13–39)
BACTERIA UR QL AUTO: ABNORMAL /HPF
BASOPHILS # BLD MANUAL: 0 THOUSAND/UL (ref 0–0.1)
BASOPHILS NFR MAR MANUAL: 0 % (ref 0–1)
BILIRUB SERPL-MCNC: 0.28 MG/DL (ref 0.2–1)
BILIRUB UR QL STRIP: NEGATIVE
BUN SERPL-MCNC: 21 MG/DL (ref 5–25)
CALCIUM SERPL-MCNC: 8.8 MG/DL (ref 8.4–10.2)
CHLORIDE SERPL-SCNC: 107 MMOL/L (ref 96–108)
CLARITY UR: CLEAR
CO2 SERPL-SCNC: 23 MMOL/L (ref 21–32)
COLOR UR: YELLOW
CREAT SERPL-MCNC: 0.98 MG/DL (ref 0.6–1.3)
EOSINOPHIL # BLD MANUAL: 0 THOUSAND/UL (ref 0–0.4)
EOSINOPHIL NFR BLD MANUAL: 0 % (ref 0–6)
ERYTHROCYTE [DISTWIDTH] IN BLOOD BY AUTOMATED COUNT: 16.1 % (ref 11.6–15.1)
FLUAV RNA RESP QL NAA+PROBE: NEGATIVE
FLUBV RNA RESP QL NAA+PROBE: NEGATIVE
GFR SERPL CREATININE-BSD FRML MDRD: 57 ML/MIN/1.73SQ M
GLUCOSE SERPL-MCNC: 124 MG/DL (ref 65–140)
GLUCOSE UR STRIP-MCNC: NEGATIVE MG/DL
HCT VFR BLD AUTO: 33.5 % (ref 34.8–46.1)
HGB BLD-MCNC: 10.1 G/DL (ref 11.5–15.4)
HGB UR QL STRIP.AUTO: ABNORMAL
HYALINE CASTS #/AREA URNS LPF: ABNORMAL /LPF
INR PPP: 1.24 (ref 0.85–1.19)
KETONES UR STRIP-MCNC: NEGATIVE MG/DL
LACTATE SERPL-SCNC: 2.3 MMOL/L (ref 0.5–2)
LEUKOCYTE ESTERASE UR QL STRIP: NEGATIVE
LYMPHOCYTES # BLD AUTO: 0 % (ref 14–44)
LYMPHOCYTES # BLD AUTO: 0 THOUSAND/UL (ref 0.6–4.47)
MCH RBC QN AUTO: 30.1 PG (ref 26.8–34.3)
MCHC RBC AUTO-ENTMCNC: 30.1 G/DL (ref 31.4–37.4)
MCV RBC AUTO: 100 FL (ref 82–98)
METAMYELOCYTE ABSOLUTE CT: 0.02 THOUSAND/UL (ref 0–0.1)
METAMYELOCYTES NFR BLD MANUAL: 1 % (ref 0–1)
MONOCYTES # BLD AUTO: 0.02 THOUSAND/UL (ref 0–1.22)
MONOCYTES NFR BLD: 1 % (ref 4–12)
MYELOCYTE ABSOLUTE CT: 0.02 THOUSAND/UL (ref 0–0.1)
MYELOCYTES NFR BLD MANUAL: 1 % (ref 0–1)
NEUTROPHILS # BLD MANUAL: 2.29 THOUSAND/UL (ref 1.85–7.62)
NEUTS BAND NFR BLD MANUAL: 9 % (ref 0–8)
NEUTS SEG NFR BLD AUTO: 88 % (ref 43–75)
NITRITE UR QL STRIP: NEGATIVE
NON-SQ EPI CELLS URNS QL MICRO: ABNORMAL /HPF
PH UR STRIP.AUTO: 5.5 [PH]
PLATELET # BLD AUTO: 185 THOUSANDS/UL (ref 149–390)
PLATELET BLD QL SMEAR: ADEQUATE
PMV BLD AUTO: 9.2 FL (ref 8.9–12.7)
POLYCHROMASIA BLD QL SMEAR: PRESENT
POTASSIUM SERPL-SCNC: 3.7 MMOL/L (ref 3.5–5.3)
PROCALCITONIN SERPL-MCNC: 8.35 NG/ML
PROT SERPL-MCNC: 6.3 G/DL (ref 6.4–8.4)
PROT UR STRIP-MCNC: ABNORMAL MG/DL
PROTHROMBIN TIME: 16.4 SECONDS (ref 12.3–15)
RBC # BLD AUTO: 3.36 MILLION/UL (ref 3.81–5.12)
RBC #/AREA URNS AUTO: ABNORMAL /HPF
RBC MORPH BLD: PRESENT
RSV RNA RESP QL NAA+PROBE: NEGATIVE
S PYO DNA THROAT QL NAA+PROBE: NOT DETECTED
SARS-COV-2 RNA RESP QL NAA+PROBE: NEGATIVE
SODIUM SERPL-SCNC: 141 MMOL/L (ref 135–147)
SP GR UR STRIP.AUTO: 1.02 (ref 1–1.03)
UROBILINOGEN UR STRIP-ACNC: <2 MG/DL
WBC # BLD AUTO: 2.36 THOUSAND/UL (ref 4.31–10.16)
WBC #/AREA URNS AUTO: ABNORMAL /HPF
WBC TOXIC VACUOLES BLD QL SMEAR: PRESENT

## 2025-01-23 PROCEDURE — 81001 URINALYSIS AUTO W/SCOPE: CPT | Performed by: INTERNAL MEDICINE

## 2025-01-23 PROCEDURE — 87651 STREP A DNA AMP PROBE: CPT | Performed by: PHYSICIAN ASSISTANT

## 2025-01-23 PROCEDURE — 96365 THER/PROPH/DIAG IV INF INIT: CPT

## 2025-01-23 PROCEDURE — 85730 THROMBOPLASTIN TIME PARTIAL: CPT | Performed by: PHYSICIAN ASSISTANT

## 2025-01-23 PROCEDURE — 85610 PROTHROMBIN TIME: CPT | Performed by: PHYSICIAN ASSISTANT

## 2025-01-23 PROCEDURE — 96413 CHEMO IV INFUSION 1 HR: CPT

## 2025-01-23 PROCEDURE — 81001 URINALYSIS AUTO W/SCOPE: CPT | Performed by: PHYSICIAN ASSISTANT

## 2025-01-23 PROCEDURE — 0241U HB NFCT DS VIR RESP RNA 4 TRGT: CPT | Performed by: PHYSICIAN ASSISTANT

## 2025-01-23 PROCEDURE — 96367 TX/PROPH/DG ADDL SEQ IV INF: CPT

## 2025-01-23 PROCEDURE — 96375 TX/PRO/DX INJ NEW DRUG ADDON: CPT

## 2025-01-23 PROCEDURE — 80053 COMPREHEN METABOLIC PANEL: CPT | Performed by: PHYSICIAN ASSISTANT

## 2025-01-23 PROCEDURE — 96366 THER/PROPH/DIAG IV INF ADDON: CPT

## 2025-01-23 PROCEDURE — 96372 THER/PROPH/DIAG INJ SC/IM: CPT

## 2025-01-23 PROCEDURE — 99285 EMERGENCY DEPT VISIT HI MDM: CPT

## 2025-01-23 PROCEDURE — G2211 COMPLEX E/M VISIT ADD ON: HCPCS

## 2025-01-23 PROCEDURE — 96417 CHEMO IV INFUS EACH ADDL SEQ: CPT

## 2025-01-23 PROCEDURE — 99285 EMERGENCY DEPT VISIT HI MDM: CPT | Performed by: PHYSICIAN ASSISTANT

## 2025-01-23 PROCEDURE — 85007 BL SMEAR W/DIFF WBC COUNT: CPT | Performed by: PHYSICIAN ASSISTANT

## 2025-01-23 PROCEDURE — 36415 COLL VENOUS BLD VENIPUNCTURE: CPT | Performed by: PHYSICIAN ASSISTANT

## 2025-01-23 PROCEDURE — 85027 COMPLETE CBC AUTOMATED: CPT | Performed by: PHYSICIAN ASSISTANT

## 2025-01-23 PROCEDURE — 87040 BLOOD CULTURE FOR BACTERIA: CPT | Performed by: PHYSICIAN ASSISTANT

## 2025-01-23 PROCEDURE — 71260 CT THORAX DX C+: CPT

## 2025-01-23 PROCEDURE — 84145 PROCALCITONIN (PCT): CPT | Performed by: PHYSICIAN ASSISTANT

## 2025-01-23 PROCEDURE — 96361 HYDRATE IV INFUSION ADD-ON: CPT

## 2025-01-23 PROCEDURE — 99348 HOME/RES VST EST LOW MDM 30: CPT

## 2025-01-23 PROCEDURE — 83605 ASSAY OF LACTIC ACID: CPT | Performed by: PHYSICIAN ASSISTANT

## 2025-01-23 PROCEDURE — 93005 ELECTROCARDIOGRAM TRACING: CPT

## 2025-01-23 PROCEDURE — 74177 CT ABD & PELVIS W/CONTRAST: CPT

## 2025-01-23 RX ORDER — SODIUM CHLORIDE 9 MG/ML
20 INJECTION, SOLUTION INTRAVENOUS ONCE
Status: COMPLETED | OUTPATIENT
Start: 2025-01-23 | End: 2025-01-23

## 2025-01-23 RX ORDER — CYANOCOBALAMIN 1000 UG/ML
1000 INJECTION, SOLUTION INTRAMUSCULAR; SUBCUTANEOUS ONCE
Status: COMPLETED | OUTPATIENT
Start: 2025-01-23 | End: 2025-01-23

## 2025-01-23 RX ORDER — PALONOSETRON 0.05 MG/ML
0.25 INJECTION, SOLUTION INTRAVENOUS ONCE
Status: COMPLETED | OUTPATIENT
Start: 2025-01-23 | End: 2025-01-23

## 2025-01-23 RX ORDER — ACETAMINOPHEN 10 MG/ML
1000 INJECTION, SOLUTION INTRAVENOUS ONCE
Status: COMPLETED | OUTPATIENT
Start: 2025-01-23 | End: 2025-01-23

## 2025-01-23 RX ADMIN — PEMETREXED DISODIUM 700 MG: 500 INJECTION, POWDER, LYOPHILIZED, FOR SOLUTION INTRAVENOUS at 13:28

## 2025-01-23 RX ADMIN — CEFEPIME HYDROCHLORIDE 2000 MG: 2 INJECTION, POWDER, FOR SOLUTION INTRAVENOUS at 22:58

## 2025-01-23 RX ADMIN — SODIUM CHLORIDE 20 ML/HR: 0.9 INJECTION, SOLUTION INTRAVENOUS at 12:17

## 2025-01-23 RX ADMIN — SODIUM CHLORIDE 1000 ML: 0.9 INJECTION, SOLUTION INTRAVENOUS at 22:57

## 2025-01-23 RX ADMIN — SODIUM CHLORIDE 1000 ML: 0.9 INJECTION, SOLUTION INTRAVENOUS at 23:42

## 2025-01-23 RX ADMIN — CYANOCOBALAMIN 1000 MCG: 1000 INJECTION, SOLUTION INTRAMUSCULAR; SUBCUTANEOUS at 13:28

## 2025-01-23 RX ADMIN — CARBOPLATIN 256.8 MG: 10 INJECTION, SOLUTION INTRAVENOUS at 13:46

## 2025-01-23 RX ADMIN — FOSAPREPITANT 150 MG: 150 INJECTION, POWDER, LYOPHILIZED, FOR SOLUTION INTRAVENOUS at 12:18

## 2025-01-23 RX ADMIN — IOHEXOL 90 ML: 350 INJECTION, SOLUTION INTRAVENOUS at 22:46

## 2025-01-23 RX ADMIN — PALONOSETRON HYDROCHLORIDE 0.25 MG: 0.25 INJECTION INTRAVENOUS at 12:17

## 2025-01-23 RX ADMIN — ACETAMINOPHEN 1000 MG: 10 INJECTION INTRAVENOUS at 22:08

## 2025-01-23 NOTE — PROGRESS NOTES
Ayla Nye was seen in the home today.  Patient was seen unaccompanied. Patient  provided all pertinent information today.  Palliative CRNP present at visit.      Symptom Management    ESAS Scale - Please rate from 0-10 the degree to which you experience the following symptoms (0 being none to 10 being the worst):  Pain - 0  Tired - 4  Drowsiness - 7  Nausea - 0  Appetite - 5  Shortness of Breath - 9 with minimal exertion - has recently been hospitalized with pneumonitis, PE, and pneumonia - patient is able to breath normally after sitting down  Depression - 0  Anxiety - 0  Wellbeing - 2  Sleeping - 5 - sleeps 4 hours/night which is better than in the past    Ayla Nye 's medications were verified today with Ayla. Ayla is managing the medications. All medications are up to date.     Allergies verified. No new allergies.     All medical, surgical, family and social history were reviewed with Ayla. There are no updates to patient's history.    All questions and concerns were addressed.  Patient was appreciative of the visit.    Next visit with RN and provider in one month.

## 2025-01-23 NOTE — ASSESSMENT & PLAN NOTE
Follows with Dr. Castro- medical oncology  Completed cycle 2 chemo on 11/14.    Infusion scheduled for 1/23- carboplatin and pemetrexed   Will also be starting with low dose Avastin.    SBRT treatment on 1/16    Next hem/onc appt 1/27  MRI ordered for 3/27.

## 2025-01-23 NOTE — ASSESSMENT & PLAN NOTE
Ayla follows with palliative care for psychosocial support and symptom management of lung cancer with brain mets, perianal tumor   Symptoms - dyspnea on exertion, fatigue   ACP -  on file   RTC - 4 weeks    Lives alone.   Has two daughters .   Ex- is supportive and involved in care  Has brother that lives close by that helps as well.   Daughters are getting a life alert set up for patient.     No longer drives.

## 2025-01-23 NOTE — PROGRESS NOTES
[facility] Visit: follow up   Name: Ayla Nye      : 1950      MRN: [id number]  Encounter Provider: ZULEYMA Pollard  Encounter Date: 2025   Encounter department: [facility]    Assessment & Plan   1. Palliative care patient  Assessment & Plan:  Ayla follows with palliative care for psychosocial support and symptom management of lung cancer with brain mets, perianal tumor   Symptoms - dyspnea on exertion, fatigue   ACP -  on file   RTC - 4 weeks    Lives alone.   Has two daughters .   Ex- is supportive and involved in care  Has brother that lives close by that helps as well.   Daughters are getting a life alert set up for patient.     No longer drives.   2. 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 Castro- medical oncology  Completed cycle 2 chemo on .    Infusion scheduled for - carboplatin and pemetrexed   Will also be starting with low dose Avastin.    SBRT treatment on     Next hem/onc appt   MRI ordered for 3/27.  3. Cancer associated pain  Assessment & Plan:  Current regimen:  Gabapentin 100mg AM, 100mg at noon, 300mg Bedtime  Tylenol 650mg PRN  Heating pad during the day  Failed therapies:  Celebrex 200mg BID  Oxycodone- felt like she had a headache from the medication  Would like to avoid opiates  Bowel regimen to prevent OIC:  Senna          Subjective   Chief Complaint   Patient presents with    Follow-up     Follow up home visit for psychosocial support and symptom management secondary to palliative diagnosis of lung cancer with mets to brain, perianal tumor.        History of Present Illness     Ayla Nye is a 74 y.o. female w/    w/ Stage III NSCLC, new onset brain mets 24, completed SRS. She was found to have mass in the right anorectal fat/right ischial tuberosity per PET. She follows with [facility]      Patient is seen in the  "home with palliative RN present.    Patient states that she doesn't have pain, but knows that her mass in the pelvis is back as she can feel pain.   She is back to taking gabapentin 100mg Am, 100mg noon, 300mg pm.  Has aide that comes daily for a few hours.   Has infusion scheduled today.  Is hopeful to be able to continue to wean from steroids  Appetite- patient reports its on and off. She has bene eating \"sweets\".  Sleep- improving.  Having shortness of breath- gets better rest.    Has appt with Dr. Castro on 1/27.  MRI scheduled for 3/27.      Current Outpatient Medications:     amLODIPine (NORVASC) 5 mg tablet, TAKE 1 TABLET(5 MG) BY MOUTH DAILY, Disp: 30 tablet, Rfl: 5    dexamethasone (DECADRON) 4 mg tablet, Take 1 tablet (4 mg total) by mouth every 8 (eight) hours for 5 days, THEN 1 tablet (4 mg total) every 12 (twelve) hours for 7 days, THEN 1.5 tablets (6 mg total) in the morning for 7 days, THEN 0.5 tablets (2 mg total) 2 (two) times a day for 7 days, THEN 0.5 tablets (2 mg total) in the morning for 7 days. When you get to the 6 mg dosage please take 4 mg in the morning followed by 2 mg in the evening; that would be 1 tablet in the morning followed by 1/2 tablet in the evening for that week.., Disp: 50 tablet, Rfl: 0    folic acid (FOLVITE) 1 mg tablet, Take 1 tablet (1 mg total) by mouth daily, Disp: 30 tablet, Rfl: 6    gabapentin (NEURONTIN) 100 mg capsule, TAKE 1 CAPSULE BY MOUTH EVERY MORNING, 1 CAPSULE EVERY AFTERNOON AND 3 CAPSULES EVERY NIGHT AT BEDTIME, Disp: 150 capsule, Rfl: 5    levETIRAcetam (KEPPRA) 1000 MG tablet, TAKE 1 TABLET(1000 MG) BY MOUTH EVERY 12 HOURS, Disp: 60 tablet, Rfl: 1    levothyroxine 50 mcg tablet, TAKE 1 TABLET(50 MCG) BY MOUTH DAILY EARLY MORNING, Disp: 30 tablet, Rfl: 1    pantoprazole (PROTONIX) 40 mg tablet, Take 1 tablet (40 mg total) by mouth daily in the early morning, Disp: 30 tablet, Rfl: 5    rivaroxaban (Xarelto) 20 mg tablet, Take 1 tablet (20 mg total) by " "mouth daily with breakfast, Disp: 30 tablet, Rfl: 0    senna (SENOKOT) 8.6 MG tablet, Take 1 tablet by mouth daily Takes one every other day (Patient taking differently: Take 2 tablets by mouth daily Takes two every other day), Disp: , Rfl:     sulfamethoxazole-trimethoprim (BACTRIM DS) 800-160 mg per tablet, Take 1 tablet by mouth daily Starting 12/24, you can take one tablet Monday, Wednesday, and Fridays. Do not start before December 24, 2024., Disp: 36 tablet, Rfl: 4    vitamin B-12 (VITAMIN B-12) 1,000 mcg tablet, Take 1 tablet (1,000 mcg total) by mouth daily, Disp: 90 tablet, Rfl: 1    Probiotic Product (PROBIOTIC DAILY PO), Take 1 capsule by mouth daily ON HOLD (Patient not taking: Reported on 1/23/2025), Disp: , Rfl:   No current facility-administered medications for this visit.    Facility-Administered Medications Ordered in Other Visits:     alteplase (CATHFLO) injection 2 mg, 2 mg, Intracatheter, Q1MIN PRN, Rosalinda Castro MD    CARBOplatin (PARAPLATIN) 256.8 mg in sodium chloride 0.9 % 250 mL IVPB, 256.8 mg, Intravenous, Once, Rosalinda Castro MD    cyanocobalamin injection 1,000 mcg, 1,000 mcg, Intramuscular, Once, Rosalinda Castro MD    fosaprepitant (EMEND) 150 mg in sodium chloride 0.9 % 250 mL IVPB, 150 mg, Intravenous, Once, Rosalinda Castro MD    PEMEtrexed (ALIMTA) 700 mg in sodium chloride 0.9 % 100 mL chemo infusion, 700 mg, Intravenous, Once, Rosalinda Castro MD    Objective   /82 (BP Location: Left arm, Patient Position: Sitting, Cuff Size: Standard)   Pulse 80   Temp 97.7 °F (36.5 °C) (Temporal)   Resp 16   Ht 5' 2\" (1.575 m)   Wt 56.5 kg (124 lb 9.6 oz)   SpO2 97%   BMI 22.79 kg/m²   Physical Exam  Vitals and nursing note reviewed.   Constitutional:       General: She is not in acute distress.  HENT:      Head: Normocephalic and atraumatic.   Eyes:      General:         Right eye: No discharge.         Left eye: No discharge.   Cardiovascular:      Rate and Rhythm: Normal rate. "   Pulmonary:      Effort: Pulmonary effort is normal. No respiratory distress.   Abdominal:      General: Bowel sounds are normal. There is no distension.      Palpations: Abdomen is soft.   Musculoskeletal:         General: Normal range of motion.      Cervical back: Normal range of motion.      Right lower leg: No edema.      Left lower leg: No edema.   Skin:     General: Skin is warm and dry.   Neurological:      General: No focal deficit present.      Mental Status: She is alert and oriented to person, place, and time. Mental status is at baseline.   Psychiatric:         Mood and Affect: Mood normal.         Behavior: Behavior normal.         Thought Content: Thought content normal.         Judgment: Judgment normal.          Recent labs:  Lab Results   Component Value Date/Time    SODIUM 139 01/18/2025 09:07 AM    K 4.2 01/18/2025 09:07 AM    BUN 21 01/18/2025 09:07 AM    CREATININE 0.97 01/18/2025 09:07 AM    GLUC 87 01/18/2025 09:07 AM    CALCIUM 9.0 01/18/2025 09:07 AM    AST 10 (L) 01/18/2025 09:07 AM    ALT 9 01/18/2025 09:07 AM    ALB 3.7 01/18/2025 09:07 AM    TP 6.4 01/18/2025 09:07 AM    EGFR 57 01/18/2025 09:07 AM     Lab Results   Component Value Date/Time    HGB 10.2 (L) 01/18/2025 09:07 AM    WBC 12.14 (H) 01/18/2025 09:07 AM     01/18/2025 09:07 AM    INR 1.38 (H) 12/30/2024 08:50 PM    PTT 41 (H) 12/30/2024 08:50 PM     Lab Results   Component Value Date/Time    [id number] 1.186 01/18/2025 09:07 AM       Recent Imaging:  Procedure: CT chest w contrast  Result Date: 1/14/2025  Narrative: CT CHEST WITH IV CONTRAST INDICATION: C34.11: Malignant neoplasm of upper lobe, right bronchus or lung. New onset back pain COMPARISON: Abdominal CT dated 12/31/2024 TECHNIQUE: CT examination of the chest was performed. Multiplanar 2D reformatted images were created from the source data. This examination, like all CT scans performed in the [facility], was performed utilizing techniques to  minimize radiation dose exposure, including the use of iterative reconstruction and automated exposure control. Radiation dose length product (DLP) for this visit: 157.4 mGy-cm IV Contrast: 85 mL of iohexol (OMNIPAQUE) FINDINGS: LUNGS: There are enlarging nodules in the left lower lobe and lingula including 8 mm lingula nodule on image 176 of series 3 which measured 5 mm previously. A bilobed nodule versus 2 adjacent nodules in the left lower lobe measure 8 x 13 mm on image 177 compared with 4 x 8 mm previously. Right upper lobe mass measures 2.5 cm AP by 2.5 cm transverse on image 61 of series 3, unchanged. Adjacent bandlike parenchymal opacity with involvement of the right upper and lower lobes appears similar and may reflect  posttreatment change. There is a background of mild emphysema. PLEURA: Unremarkable. HEART/GREAT VESSELS: Heart is unremarkable for patient's age. No thoracic aortic aneurysm. MEDIASTINUM AND SUDEEP: Low-attenuation soft tissue along the mediastinum on the right which is contiguous with the esophagus may reflect volume averaging with the adjacent azygos vein. CHEST WALL AND LOWER NECK: Unremarkable. VISUALIZED STRUCTURES IN THE UPPER ABDOMEN: Ill-defined mass along the upper pole of the right kidney measures 3.8 cm transverse by 2.9 cm AP on image 104 of series 2, similar to prior abdominal CT. Hepatic steatosis is noted. OSSEOUS STRUCTURES: No acute fracture or destructive osseous lesion.     Impression: 1. No evidence of osseous metastatic disease of the thoracic spine. 2. Worsening nodules in the left lower lobe and lingula compatible with metastatic disease. 3. Similar appearance of the posttreatment change in the right lung. 4. Low-attenuation soft tissue density along the right mediastinum appears slightly more pronounced but at least partially reflects the azygos vein. Recommend attention to this area on follow-up. 5. Right upper renal mass is similar to recent abdominal CT. Primary or  metastatic renal neoplasm suspected. Workstation performed: [id number]     Procedure: CT abdomen w contrast  Result Date: 1/1/2025  Narrative: CT ABDOMEN WITH IV CONTRAST INDICATION: known malignancy. COMPARISON: 11/24/2024 TECHNIQUE: CT examination of the abdomen was performed after the administration of intravenous contrast. Multiplanar 2D reformatted images were created from the source data. This examination, like all CT scans performed in the [facility], was performed utilizing techniques to minimize radiation dose exposure, including the use of iterative reconstruction and automated exposure control. Radiation dose length product (DLP) for this visit: 214 mGy-cm IV Contrast: 85 mL of iohexol (OMNIPAQUE) Enteric Contrast: Not administered. FINDINGS: LOWER CHEST: Calcified left lower lobe pleural plaque. Cardiomegaly. LIVER/BILIARY TREE: Unremarkable. GALLBLADDER: No calcified gallstones. No pericholecystic inflammatory change. SPLEEN: Unremarkable. PANCREAS: Unremarkable. ADRENAL GLANDS: Unremarkable. KIDNEYS/VISUALIZED URETERS: Enlarging complex mass upper pole right kidney measuring 4.8 x 3.0 cm (previously 3.2 x 1.9 cm at a comparable level on exam from 11/24/2024). No hydronephrosis. Nonobstructing right nephrolithiasis. Scattered subcentimeter hypodensities arising from both kidneys, too small to characterize. STOMACH AND VISUALIZED BOWEL: Unremarkable. ABDOMINAL CAVITY: No ascites. No pneumoperitoneum. No lymphadenopathy. VESSELS: Atherosclerosis without abdominal aortic aneurysm. ABDOMINAL WALL: Unremarkable. BONES: No acute fracture or suspicious osseous lesion. Spinal degenerative changes.     Impression: 1.  Enlarging complex mass upper pole right kidney concerning for enlarging metastasis or primary renal neoplasia. 2.  No acute findings. See comments above for full analysis. Workstation performed: [id number]     Procedure: XR chest pa and lateral  Result Date: 12/31/2024  Narrative: XR  CHEST PA AND LATERAL INDICATION: SOB. COMPARISON: Chest radiograph 12/1/2024 and concurrent CTA chest FINDINGS: Known right upper lobe mass is evident, though its full extent is better evident on prior CT. Increased right upper lobe atelectasis. Persistent opacities throughout the right upper lung. No pneumothorax or pleural effusion. Normal cardiomediastinal silhouette. Age-related degenerative changes in the spine. Normal upper abdomen..    Impression: Right upper lobe mass which is better characterized on concurrent CTA chest. Right upper lung atelectasis has increased since the 12/1/2024 radiograph. Opacities in the right upper lung could represent posttreatment change an/or tumor. Infection is to be  determined clinical grounds. Resident: ASYA CHANCE I, the attending radiologist, have reviewed the images and agree with the final report above. 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 COMPARISON: CTA PE study 11/24/2024. TECHNIQUE: CTA examination of the chest was performed using angiographic technique according to a protocol specifically tailored to evaluate for pulmonary embolism. Multiplanar 2D reformatted images were created from the source data. In addition, coronal  3D MIP postprocessing was performed on the acquisition scanner. Radiation dose length product (DLP) for this visit: 191 mGy-cm . This examination, like all CT scans performed in the [facility], was performed utilizing techniques to minimize radiation dose exposure, including the use of iterative reconstruction and automated exposure control. IV Contrast: 85 mL of iohexol (OMNIPAQUE) FINDINGS: PULMONARY ARTERIAL TREE: Interval resolution of right middle  lobe/right lower lobe pulmonary embolism. Interval near complete resolution of previously seen left lower lobe pulmonary embolism with questionable minimal residual linear thrombus in this region (301/109). LUNGS: Spiculated right upper lobe nodule measures 2.5 x 2.4 cm (3/62), slightly larger than on prior exam. Similar appearance of right upper lobe groundglass opacities with interval increase in opacities in the superior right lower lobe. Cluster of micronodules in the medial right upper lobe (304/) may be infectious or inflammatory. Additional groundglass and reticular opacities in the left upper lobe. New lingular 6 mm nodule (304/173) and left lower lobe 7 mm nodule (304/170). Mild emphysema. PLEURA: Left calcified basilar pleural plaque (301/158). HEART/GREAT VESSELS: Heart is normal in size. Coronary artery calcifications. No thoracic aortic aneurysm. MEDIASTINUM AND SUDEEP: Unremarkable. CHEST WALL AND LOWER NECK: Unremarkable. VISUALIZED STRUCTURES IN THE UPPER ABDOMEN: Few calculi within the partially imaged right kidney measuring up to 3 mm without hydronephrosis in the visualized portion of the right kidney. Exophytic lesion arising from the upper pole of the right kidney measuring approximately 2.6 cm (602/112) in the region in which metastasis was suspected on prior CT. OSSEOUS STRUCTURES: Degenerative changes of the spine.     Impression: No new pulmonary embolism. Resolution of previously seen right-sided pulmonary embolism. Near complete resolution of previously seen left lower lobe pulmonary embolism with questionable minimal residual linear thrombus in this region. Spiculated right upper lobe nodule measures slightly larger than on prior exam. Increase in right lung groundglass opacities which may represent worsening pneumonia and/or malignancy. Incompletely evaluated right upper pole lesion appears larger than on prior exam, suspicious for metastasis. Dedicated abdominal imaging is  recommended. Examination was marked in EPIC for immediate notification. Workstation performed: [id number]     Procedure: MRI Brain BT w wo Contrast  Result Date: 12/29/2024  Narrative: MRI BRAIN WITH AND WITHOUT CONTRAST INDICATION: C79.31: Secondary malignant neoplasm of brain. COMPARISON: MRI brain 10/21/2024. MRI brain brain tumor protocol 10/8/2024. TECHNIQUE: Multiplanar, multisequence imaging of the brain and sella was performed before and after gadolinium administration. IV Contrast:  10 mL of Gadobutrol injection (SINGLE-DOSE) IMAGE QUALITY:   Motion-degraded, which reduces diagnostic sensitivity. FINDINGS: Multiple enhancing metastatic lesions as detailed below with comparison made to MRI brain tumor protocol 10/8/2024. 1. Stable tiny, subtle enhancing lesion in the left frontal lobe on series 13, image 121. 2. Increased size of a now 1.1 cm lesion in the left paramedian parietal lobe on series 13, image 105, previously 0.6 cm. 3. Decreased size of a now tiny lesion in the left paramedian frontal lobe on series 13, image 102 that previously measured 3 mm. 4. Increased size of a now 3.5 cm left occipital lesion (centrally hypoenhancing) on series 13, image 81 that previously measured 2.2 cm. 5. Left temporal lesion that was previously 4 mm is not as conspicuous/barely perceptible on the current study. 6. Decreased size of a now tiny lesion in the midline cerebellar vermis on series 13, image 47, previously 2 mm. There is a new lesion identified in the left frontal lobe on series 13, image 106 that measures 4 mm. There is slightly increased vasogenic edema in the left parietal and occipital lobes, when compared to the prior study, with mild mass effect on the left lateral ventricle. Slightly less conspicuous enhancement involving the bilateral cranial nerve VII and IACs. No acute infarct. Mild chronic ischemic changes of the white matter. PERFUSION: While most of the lesions above are too small to characterize  by perfusion, there is no appreciable elevated perfusion associated with the left occipital and left parietal lobe lesions. VENTRICLES: As above. No hydrocephalus. SELLA AND PITUITARY GLAND:  Normal. ORBITS: No acute abnormality. PARANASAL SINUSES: Trace mucosal thickening. VASCULATURE:  Evaluation of the major intracranial vasculature demonstrates appropriate flow voids. CALVARIUM AND SKULL BASE: No acute abnormality. EXTRACRANIAL SOFT TISSUES: No acute abnormality.     Impression: 1. New enhancing lesion in the left frontal lobe, which is suspicious for metastasis. 2. Increased size of left occipital and left paramedian parietal lobe lesions without appreciable elevated perfusion. Findings favor posttreatment sequelae/radiation effects though recommend continued attention on follow-up. Of note, there is slightly increased associated vasogenic edema in the left cerebral hemisphere, as described above. 3. The other lesions are stable to decreased/less conspicuous compared to the prior study. 4. 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 Recommend continued clinical and imaging surveillance. The study was marked in EPIC for significant notification. Workstation performed: [id number]     Procedure: XR chest portable  Result Date: 12/1/2024  Narrative: XR CHEST PORTABLE INDICATION: increased o2 requirements. COMPARISON: CXR 11/29/2024, chest CT 11/24/2024. FINDINGS: Redemonstration of right upper lobe nodule and right upper lobe pneumonia. No pneumothorax or pleural effusion. Normal cardiomediastinal silhouette. Bones are unremarkable for age. Normal upper abdomen.     Impression: Redemonstration of right upper lobe nodule and right upper lobe pneumonia with no significant change. Workstation performed: [id number]     Procedure: Bronchoscopy  Result Date: 11/29/2024  Narrative: Table formatting from  the original result was not included. [facility] [address] [place] [address] [phone] DATE OF SERVICE: 11/29/24 PHYSICIAN(S): Attending: Teresita Forde DO Fellow: Eliseo Small MD INDICATION: Pneumonia, Acute hypoxic respiratory failure (HCC) POST-OP DIAGNOSIS: See the impression below. PREPROCEDURE: Standard airway preparation completed per respiratory therapy protocol.  Informed consent was obtained. Images reviewed prior to the procedure.  A Time Out was performed. No suspicion or identified risk for TB or other airborne infectious disease; bronchoscopy procedure being performed for diagnostic purposes. PROCEDURE: Bronchoscopy DETAILS OF PROCEDURE: Patient was taken to the procedure room where a time out was performed to confirm correct patient and correct procedure. The patient underwent moderate sedation, which was administered by an anesthesia professional. The patient's ECG, blood pressure, heart rate, oxygen and respirations were monitored throughout the procedure. The patient experienced no blood loss. The scope was introduced through the right naris. The procedure was not difficult. The patient tolerated the procedure well. There were no apparent adverse events. ANESTHESIA INFORMATION: ASA: IV Anesthesia Type: General FINDINGS: Bronchoalveolar lavage was performed x3 in the right posterior segment (RB2) with 180 mL of saline instilled and a total return of 70 mL. The fluid appeared cloudy. The vocal cords, trachea, main ramin, left main stem, left upper lobar bronchus, left apical-posterior segment (LB1+2), left upper anterior segment (LB3), lingular lobar bronchus, superior lingular segment (LB4), inferior lingular segment (LB5), left lower lobar bronchus, left superior segment (LB6), left anterior basal segment (LB7+8), left lateral basal segment (LB9), left posterior basal segment (LB10), right main stem, right upper lobar bronchus, right apical segment  (RB1), right posterior segment (RB2), right anterior segment (RB3), bronchus intermedius, right middle lobar bronchus, right lateral segment (RB4), right medial segment (RB5), right lower lobar bronchus, right superior segment (RB6), right medial basal segment (RB7), right anterior basal segment (RB8), right lateral basal segment (RB9) and right posterior basal segment (RB10) appeared normal. SPECIMENS: ID Type Source Tests Collected by Time Destination 1 :  Lavage Lung, Right Upper Lobe Bronchoalveolar Lavage PULMONARY CYTOLOGY, BRONCHOALVEOLAR LAVAGE (BAL) DIFFERENTIAL Teresita Forde DO 11/29/2024  4:11 PM       Impression: Bronchoalveolar lavage was performed x3 in the right posterior segment (RB2) and the fluid appeared cloudy The vocal cords, trachea, main ramin, left main stem, left upper lobar bronchus, left apical-posterior segment (LB1+2), left upper anterior segment (LB3), lingular lobar bronchus, superior lingular segment (LB4), inferior lingular segment (LB5), left lower lobar bronchus, left superior segment (LB6), left anterior basal segment (LB7+8), left lateral basal segment (LB9), left posterior basal segment (LB10), right main stem, right upper lobar bronchus, right apical segment (RB1), right posterior segment (RB2), right anterior segment (RB3), bronchus intermedius, right middle lobar bronchus, right lateral segment (RB4), right medial segment (RB5), right lower lobar bronchus, right superior segment (RB6), right medial basal segment (RB7), right anterior basal segment (RB8), right lateral basal segment (RB9) and right posterior basal segment (RB10) appeared normal. RECOMMENDATION: Follow up culture results from Bronchoscopy      Procedure: XR chest portable  Result Date: 11/29/2024  Narrative: XR CHEST PORTABLE INDICATION: Re-evaluate PE. COMPARISON: Chest radiograph 11/24/2024, 10/16/2024 and CT chest, abdomen pelvis 11/24/2024 FINDINGS: Slight worsening of right upper lobe airspace opacity,  partially obscuring known right upper lobe mass. The left lung is grossly clear. No pneumothorax or pleural effusion. Normal cardiomediastinal silhouette. Bones are unremarkable for age. Normal upper abdomen.     Impression: Slight worsening of right upper lobe airspace opacity, partially obscuring known right upper lobe mass. Workstation performed: [id number]     Procedure: XR chest 2 views  Result Date: 11/25/2024  Narrative: XR CHEST PA AND LATERAL INDICATION: Cough, shortness of breath. COMPARISON: Chest radiograph 10/16/2024. CT PE study 11/24/2024. FINDINGS: Patchy consolidation within the peripheral right upper lobe. Redemonstration of the right upper lobe paramediastinal mass measuring 2.5 x 1.9 cm. No pneumothorax or pleural effusion. Normal cardiomediastinal silhouette. Bones are unremarkable for age. Normal upper abdomen.     Impression: Right upper lobe pneumonia. Redemonstration of the right upper lobe mass, better evaluated on the CT from 11/24/2024. Resident: Sanaz Corona I, the attending radiologist, have reviewed the images and agree with the final report above. Workstation performed: [id number]     Procedure: Echo complete w/ contrast if indicated  Result Date: 11/25/2024  Narrative:   Left Ventricle: Left ventricular cavity size is normal. Wall thickness is normal. The left ventricular ejection fraction is 65%. Systolic function is normal. Wall motion is normal. Diastolic function is mildly abnormal, consistent with grade I (abnormal) relaxation.   Mitral Valve: There is mild annular calcification.   Tricuspid Valve: There is mild regurgitation.     Procedure: CT pe study w abdomen pelvis w contrast  Result Date: 11/25/2024  Narrative: CT PULMONARY ANGIOGRAM OF THE CHEST AND CT ABDOMEN AND PELVIS WITH INTRAVENOUS CONTRAST INDICATION: elevated d-dimer, sob, r/o PE. HX Lung cancer with mets to brain. COMPARISON: CT 10/22/2024, 7/3/2024, PET/CT 3/22/2024 TECHNIQUE: CT examination of the chest,  abdomen and pelvis was performed. Thin section CT angiographic technique was used in the chest in order to evaluate for pulmonary embolus and coronal 3D MIP postprocessing was performed on the acquisition scanner. Multiplanar 2D reformatted images were created from the source data. This examination, like all CT scans performed in the [facility], was performed utilizing techniques to minimize radiation dose exposure, including the use of iterative reconstruction and automated exposure control. Radiation dose length product (DLP) for this visit: 369 mGy-cm IV Contrast: 85 mL of iohexol (OMNIPAQUE) Enteric Contrast: Not administered. FINDINGS: CHEST PULMONARY ARTERIAL TREE: Embolism straddles between the middle lobe and right lower lobe basal segment arteries, as seen on series 301/112, this is new from the most recent prior. Additional embolism noted within the left lower lobe basal arteries straddling into the lower lingular segment, as noted on series 301/98, also new from prior. RV diameter at the level of the AV valve = 3. 9 cm LV diameter at the level of the AV valve = 2.7 cm Ratio equals 1.4 LUNGS: There are new patchy airspace opacities seen throughout the right upper lobe Spiculated right upper lobe nodule measures 2.5 x 1.9 cm, similar There is mild diffuse groundglass opacities throughout, likely representing a mild pulmonary edema, previously noted groundglass nodule is predominantly obscured Mild emphysema PLEURA: Unremarkable. HEART/AORTA: Heart is unremarkable for patient's age. No thoracic aortic aneurysm. MEDIASTINUM AND SUDEEP: Unremarkable. CHEST WALL AND LOWER NECK: Unremarkable. ABDOMEN LIVER/BILIARY TREE: Unremarkable. GALLBLADDER: No calcified gallstones. No pericholecystic inflammatory change. SPLEEN: Unremarkable. PANCREAS: Unremarkable. ADRENAL GLANDS: Unremarkable. KIDNEYS/URETERS: 2 x 1.8 cm hypodense right upper pole lesion, series 306/30, with surrounding hypoenhancing  parenchyma, similar to prior 0.6 cm ill-defined area of hypoenhancement within the posterior right lower pole series 306/58, stable from most recent prior 2 mm nonobstructing right upper pole stone STOMACH AND BOWEL: Unremarkable. APPENDIX: No findings to suggest appendicitis. ABDOMINOPELVIC CAVITY: No ascites. No pneumoperitoneum. No lymphadenopathy. VESSELS: Unremarkable for patient's age. PELVIS REPRODUCTIVE ORGANS: Unremarkable for patient's age. URINARY BLADDER: Unremarkable. ABDOMINAL WALL/INGUINAL REGIONS: 4 x 3.6 cm centrally hypodense collection within the right ischio rectal fossa, previously 6.3 cm BONES: No acute fracture or suspicious osseous lesion.     Impression: 1.  Bilateral pulmonary emboli 2.  The calculated ratio of right ventricular to left ventricular diameter (RV/LV ratio) is 1.4. This is greater than 0.9, which is abnormal and indicates right heart strain. An abnormal RV/LV ratio has been shown to be associated with an increased risk of 30 day mortality in the setting of acute pulmonary embolism. 3.  Right upper lobe opacities consistent with pneumonia 4.  Mild pulmonary edema 5.  Stable right upper lobe pulmonary mass 6.  Stable right renal lesion suspicious for metastasis 7.  Decrease in size of mass in the right ischio rectal fossa I personally discussed impression 1-3 with Fitz Burr at 11/25/24 12:44 AM Workstation performed: [id number]     Procedure: CT head without contrast  Result Date: 11/24/2024  Narrative: CT BRAIN - WITHOUT CONTRAST INDICATION:   fever tachycardia, hx brain mets. COMPARISON: CT brain dated October 20, 2024. MRI brain dated October 21, 2024. TECHNIQUE:  CT examination of the brain was performed.  Multiplanar 2D reformatted images were created from the source data. Radiation dose length product (DLP) for this visit:  1003 mGy-cm .  This examination, like all CT scans performed in the [facility], was performed utilizing techniques to minimize  radiation dose exposure, including the use of iterative reconstruction and automated exposure control. IMAGE QUALITY:  Diagnostic. FINDINGS: PARENCHYMA: Again noted is extensive vasogenic edema at the left parietal and occipital lobes similar to the prior exam in this patient with a known metastatic lesion in this region, poorly visualized on this unenhanced study. There is similar mass effect on the posterior horn of the left lateral ventricle. No midline shift. There is mild periventricular white matter low attenuation which is nonspecific and most likely related to chronic small vessel ischemic changes. No acute intracranial hemorrhage or ischemia. VENTRICLES AND EXTRA-AXIAL SPACES: See above. No hydrocephalus. VISUALIZED ORBITS: Normal visualized orbits. PARANASAL SINUSES: Normal visualized paranasal sinuses. CALVARIUM AND EXTRACRANIAL SOFT TISSUES: Normal.     Impression: Reidentified vasogenic edema at the left parietal and left occipital lobe similar to the prior exam, attributed to the patient's history of brain metastases, poorly visualized on this unenhanced study. There is similar mass effect on the posterior horn of the left lateral ventricle. No midline shift. No acute intracranial abnormality. Mild chronic small vessel ischemic changes. Workstation performed: [id number]     Procedure: EEG Video Monitoring 24 Hour  Result Date: 10/26/2024  Narrative: Table formatting from the original result was not included. Continuous Video EEG Monitoring Patient Name:  Ayla Nye  MRN: [id number] :  1950 File #: [id number]  Age: 74 y.o. Ordering Provider: Hao Torrez MD  Study Start: 10/22/2024 0840 Study End: 10/22/2024 2317            Study type: Continuous video EEG ICD 10 diagnosis: Seizure R56.9 and Encephalopathy, unspecified G93.40  ------------------------------------------------- Patient History: This recording was observed in a 73 y.o. female with history of brain metastasis to  evaluate for seizure as the cause of encephalopathy.  Medications include: Levetiracetam  ------------------------------------------------- Description of Procedure: 32 channel digital recording with electrodes placed according to the International 10-20 system with continuous video, ECG, EOG and the possible addition of T1/T2 electrodes. This study was monitored intermittently by a monitoring technologist.  The physician interpreting the study had access to the data throughout the recording.   The recording was technically satisfactory.  ------------------------------------------------- Results: Manual Review: During wakefulness there were runs of 10-11 Hz posteriorly dominant rhythm that attenuated with eye opening and was present nearly exclusively on the right. There were frontal low amplitude beta activities.  During drowsiness the posteriorly dominant rhythm attenuated and there were diffuse theta and beta activities. During sleep there were vertex waves and sleep spindles.  There were continuous left posterior quadrant low to medium amplitude polymorphic theta more than delta activities. There were higher amplitudes and enhanced fast activities at T5 and P3. There were T5/P3 sharp waves, often poorly formed. Initially there were frequent left posterior temporal / parietal poorly formed (blunt and at times sharply contoured) LPDs occurring at 0.5-1 Hz. Periodic discharges attenuated over the initial hours of recording. By 14:30 there were frequent sporadic sharp waves, but periodic discharges had resolved. By the end of recording left posterior sharp waves were occasional.  Other findings: Samples of the single channel ECG demonstrated a regular rhythm.  Events: No push buttons were activated.  ------------------------------------------------- Interpretation: This 14.5 hour continuous video-EEG recording is abnormal.  Continuous left posterior quadrant polymorphic theta/delta slowing indicates underlying focal  neuronal dysfunction. A left posterior breach rhythm is consistent with an underlying skull defect. Initially, there were frequent left posterior temporal / parietal poorly formed (blunt and at times sharply contoured) lateralized periodic discharges (LPDs) occurring at 0.5-1 Hz. LPDs resolved after the first few hours of recording.  No seizures are present. This concludes 38.5 hours of continuous video EEG monitoring. No seizures were present. Hao Torrez MD  [facility] Diplomate, ABPN Neurology and Epilepsy     Procedure: EEG Video Monitoring 24 Hour  Result Date: 10/23/2024  Narrative: Table formatting from the original result was not included. Continuous Video EEG Monitoring Patient Name:  Ayla Nye  MRN: [id number] :  1950 File #: [id number]  Age: 73 y.o. Ordering Provider: Wander Muller DO  Start/End: 10/21/2024 0229 to 10/21/2024 0330 and 10/21/2024 0839 to 10/22/2024 0839 No recording from 19:36 to 21:04 (MRI)            Study type: Continuous video EEG ICD 10 diagnosis: Seizure R56.9 and Encephalopathy, unspecified G93.40 ------------------------------------------------- Patient History: This recording was observed in a 73 y.o. female with history of brain metastasis to evaluate for seizure as the cause of encephalopathy. Medications include: Levetiracetam ------------------------------------------------- Description of Procedure: 32 channel digital recording with electrodes placed according to the International 10-20 system with continuous video, ECG, EOG and the possible addition of T1/T2 electrodes. This study was monitored intermittently by a monitoring technologist.  The physician interpreting the study had access to the data throughout the recording.   The recording was technically satisfactory. ------------------------------------------------- Results: Manual Review: During wakefulness there were runs of 10-11 Hz posteriorly dominant rhythm that attenuated  with eye opening and was present nearly exclusively on the right. There were frontal low amplitude beta activities. During drowsiness the posteriorly dominant rhythm attenuated and there were diffuse theta and beta activities. During sleep there were vertex waves and sleep spindles. There were continuous right posterior quadrant low to medium amplitude polymorphic theta more than delta activities. There were higher amplitudes and enhanced fast activities at T5 and P3. There were occasional T5/P3 sharp waves, often poorly formed. During the final  hours of recording there were frequent left posterior temporal / parietal poorly formed (blunt and at times sharply contoured) LPDs occurring at 0.5-1 Hz. Other findings: Samples of the single channel ECG demonstrated a regular rhythm. Events: No push buttons were activated. ------------------------------------------------- Interpretation: This 24 hour continuous video-EEG recording is abnormal. Continuous left posterior quadrant polymorphic theta/delta slowing indicates underlying focal neuronal dysfunction. A left posterior breach rhythm is consistent with an underlying skull defect. During the later portions of recording there were frequent left posterior temporal / parietal poorly formed (blunt and at times sharply contoured) lateralized periodic discharges (LPDs) occurring at 0.5-1 Hz. LPDs indicate high seizure risk and are often associated with acute/subacute destructive neuronal injury. No seizures are present. Hao Torrez MD  [facility] Diplomate, ABPN Neurology and Epilepsy     Procedure: CT chest abdomen pelvis w contrast  Result Date: 10/23/2024  Narrative: CT CHEST, ABDOMEN AND PELVIS WITH IV CONTRAST INDICATION: Evaluate for further mets. History of lung cancer with metastases. COMPARISON: CT chest abdomen pelvis 7/3/2024, PET/CT 8/5/2024 TECHNIQUE: CT examination of the chest, abdomen and pelvis was performed. Multiplanar 2D  reformatted images were created from the source data. Coronal MIP images of the chest were also provided. This examination, like all CT [facility], was performed utilizing techniques to minimize radiation dose exposure, including the use of iterative reconstruction and automated exposure control. Radiation dose length product (DLP) for this visit: 532.3 mGy-cm IV Contrast: 85 mL of iohexol (OMNIPAQUE) Enteric Contrast: Not administered. FINDINGS: CHEST LUNGS: 2.5 x 1.8 x 1.9 cm irregular, necrotic right upper lobe medial juxtapleural mass (6/38 and 601/43) has decreased in size from previous study, previously 4.6 x 4.8 x 3.3 cm by report. A 7 x 5 mm spiculated nodule posterior to the dominant mass is again noted which appears similar (6/41). Another previously described satellite nodule anterior and inferior to the dominant mass in the right lobe previously measuring up to 1.4 cm currently measures about 0.8 x 0.4 cm (6/50. There is some linear scarring or atelectasis also noted anteriorly and medially from the dominant mass toward the pleura. New 8 mm right lower lobe nodule (6/94). New 5 mm left lower lobe nodule (6/93). Stable 2.4 cm groundglass opacity anterior left upper lobe (6/43). Mild background emphysema. Mild scattered linear atelectasis or scarring in the lungs. AIRWAYS: No significant filling defect. PLEURA: No pleural effusion. Stable right apical pleural-parenchymal scarring. Pleural calcification posterior left lower lobe again noted. HEART/GREAT VESSELS: Heart is unremarkable for patient's age. Atherosclerotic changes thoracic aorta and coronary arteries. No thoracic aortic aneurysm. MEDIASTINUM AND SUDEEP: -Right paratracheal lymph node measuring 1.0 x 0.7 cm (2/44), previously 2.4 x 2.2 cm. -1.2 x 0.8 cm precarinal lymph node (2/50), significantly decreased from previous diagnostic study in July 2024, difficult to directly compare to previous size on  noncontrast PET CT but also possibly slightly smaller. Some FDG avidity was present in this  region on the prior PET study. -Right hilar lymph node seen on the prior diagnostic CT measures about 6 mm short axis (2/56), previously about 1.7 cm when measured in a similar fashion and is of lower density currently. -4 mm short axis subcarinal lymph node, previously measured about 9 mm. The esophagus is unremarkable. CHEST WALL AND LOWER NECK: Subcentimeter bilateral thyroid nodules. Incidental discovery of one or more thyroid nodule(s) measuring less than 1.5 cm and without suspicious features is noted in this patient who is above 35 years old; according to guidelines published in the February 2015 white paper on incidental thyroid nodules in the Journal of the American College of Radiology (JACR), no further evaluation is recommended. ABDOMEN LIVER/BILIARY TREE: Mild hepatomegaly. Tiny hypodensity in the left hepatic lobe (2/107), stable in retrospect. Mild prominence of the common hepatic duct with tapering of the distal CBD, may be age-related. GALLBLADDER: No calcified gallstones. No pericholecystic inflammatory change. SPLEEN: Unremarkable. PANCREAS: Unremarkable. ADRENAL GLANDS: Unremarkable. KIDNEYS/URETERS: There is an ill-defined 2.2 x 1.8 cm hypodensity within the medial upper pole right kidney which is new from the previous CT study and not clearly detectable on prior PET/CT either. There is no significant perinephric fat stranding or elevated white blood cell count to suggest pyelonephritis, therefore this is suspicious for metastasis. There is also a 6 mm hypodensity in the posterior lower pole right kidney, barely if at all perceptible on the prior contrast-enhanced CT study. Although this could correspond to enlargement of a complex subcentimeter cyst (as there are several additional small cysts in the right kidney), additional metastasis is not excluded. Tiny nonobstructing calculi right kidney measuring  up to 2 mm. Several subcentimeter left renal hypodensities are too small to characterize, unchanged from prior study. No hydronephrosis on either side. STOMACH AND BOWEL: Colonic diverticulosis without findings of acute diverticulitis. APPENDIX: No findings to suggest appendicitis. ABDOMINOPELVIC CAVITY: No diffuse ascites. Trace presacral edema which is new. No pneumoperitoneum. No lymphadenopathy. VESSELS: Atherosclerotic changes abdominal aorta without evidence of aneurysm. PELVIS REPRODUCTIVE ORGANS: Unremarkable for patient's age. URINARY BLADDER: Tiny focus of gas within the urinary bladder, presumably related to recent catheterization. Correlate clinically. ABDOMINAL WALL/INGUINAL REGIONS: Enlarging 6.3 x 4.0 x 5.0 cm irregular rim-enhancing mass with central low attenuation in the right ischiorectal fossa abutting the anteromedial margin of the right gluteus britt muscle, the adjacent obturator internus as well as the right levator ani muscle but without discrete muscle invasion of these structures. This was subcentimeter in size on previous CT study from July in retrospect, measured about 2.7 x 2.0 cm on the previous PET/CT and was FDG avid. This almost certainly represents significant enlargement of a necrotic metastasis given interval enlargement over recent subsequent study and could be easily verified with percutaneous biopsy if necessary. BONES: No acute fracture or suspicious osseous lesion.     Impression: 1. New small bilateral pulmonary nodules, likely metastases. 2. New right renal lesion(s) concerning for metastasis. 3. Significant interval enlargement of necrotic appearing mass in the right ischiorectal fossa, likely metastasis. 4. Decreasing right upper lobe necrotic mass with stable and/or decreasing satellite nodularity. 5. Improved mediastinal and right hilar adenopathy. 6. Stable 2.4 cm left upper lobe groundglass density. Workstation performed: [id number]     Procedure: MRI brain seizure  wo and w contrast  Result Date: 10/22/2024  Narrative: MRI  BRAIN  - WITH AND WITHOUT CONTRAST, SEIZURE PROTOCOL INDICATION: seizure activity.   Lung cancer with brain mets status post SRS. COMPARISON: CT from yesterday. MRI brain dated 10/8/2024. TECHNIQUE:  Multiplanar, multisequence imaging of the brain was performed before and after gadolinium administration. IV Contrast:  5 mL of Gadobutrol injection (SINGLE-DOSE) IMAGE QUALITY:   Diagnostic. FINDINGS: BRAIN PARENCHYMA: Redemonstration of multiple enhancing metastatic lesions. Left occipital lobe metastasis measuring 2.3 cm (image 15 series 12). Maximal longitudinal measurement is minimally increased but there is increased central necrosis within the anterior aspect of the lesion. Peripheral restricted diffusion in the posterior aspect of the lesion is unchanged Stable 6 mm lesion in the paramedian left occipital lobe on image 19 series 12. Punctate enhancing lesion in the left temporal lobe image 10 series 12 is mildly decreased in conspicuity. Stable punctate enhancing in the cerebellar vermis (image 7 series 12. Stable 3 mm enhancing lesion in the paramedian left frontal lobe on image 17 series 12. Previously described enhancement along the 7th nerves within the internal auditory canals is not significantly changed No new masses or abnormal enhancement. Stable left parieto-occipital edema with mass effect on the atria and occipital horns of the left lateral ventricle. No shift or herniation. No acute infarct or hemorrhage. Stable T2/FLAIR hyperintensities in the periventricular and subcortical matter likely due to mild chronic microangiopathy. VENTRICLES: No hydrocephalus. SELLA AND PITUITARY GLAND:  Normal. ORBITS:  Normal. PARANASAL SINUSES:  Normal. VASCULATURE:  Evaluation of the major intracranial vasculature demonstrates appropriate flow voids. CALVARIUM AND SKULL BASE: Stable heterogeneous marrow signal. EXTRACRANIAL SOFT TISSUES: Unremarkable      Impression: Redemonstration of intracranial metastatic lesions 2.3 cm enhancing lesion at the left occipital lobe is slightly increased in maximum dimension with increased central necrosis at the anterior margin that may be partly due to radiation necrosis. Additional smaller metastases are stable or mildly decreased in size. Stable left parieto-occipital edema with mass effect on the left lateral ventricle. No new metastases. No acute infarct or hemorrhage Workstation performed: [id number]     Procedure: CT stroke alert brain  Result Date: 10/20/2024  Narrative: CT BRAIN - STROKE ALERT PROTOCOL INDICATION:   Stroke Alert. COMPARISON:  None. TECHNIQUE:  CT examination of the brain was performed.  In addition to axial images, coronal reformatted images were created and submitted for interpretation. Radiation dose length product (DLP) for this visit:  1003 mGy-cm .  This examination, like all CT scans performed in the [facility], was performed utilizing techniques to minimize radiation dose exposure, including the use of iterative reconstruction and automated exposure control. IMAGE QUALITY:  Diagnostic. FINDINGS: PARENCHYMA: Vasogenic edema in the left parietal and superior temporal region. Periventricular and subcortical hypoattenuating foci suggesting underlying microangiopathic disease. No intracranial hemorrhage. Mass effect in the left mesial temporal and parietal region without midline shift. VENTRICLES AND EXTRA-AXIAL SPACES: Partial effacement of the left temporal horn. No hydrocephalus. VISUALIZED ORBITS: Intact. PARANASAL SINUSES: Clear. CALVARIUM AND EXTRACRANIAL SOFT TISSUES:   No lytic or blastic lesion.     Impression: Stable vasogenic edema in the left parietal temporal region. Previously demonstrated left posterior parietal lesion is less well-visualized likely due to differences in technique. Findings were directly discussed with Liang Luong at approximately  7:10 PM  .  Workstation performed: [id number]     Procedure: CTA stroke alert (head/neck)  Result Date: 10/20/2024  Narrative: CTA NECK AND BRAIN WITH AND WITHOUT CONTRAST INDICATION: Stroke Alert COMPARISON: 2/28/2024, 9/9/2024 and 10/8/2024. TECHNIQUE:  Post contrast imaging was performed after administration of iodinated contrast through the neck and brain. Post contrast axial 0.625 mm images timed to opacify the arterial system.  3D rendering was performed on an independent workstation.   MIP reconstructions performed. Coronal and sagittal reconstructions were performed of the non contrast portion of the brain. Radiation dose length product (DLP) for this visit:  1126 mGy-cm .  This examination, like all CT scans performed in the [facility], was performed utilizing techniques to minimize radiation dose exposure, including the use of iterative reconstruction and automated exposure control. IV Contrast:  70 mL of iohexol (OMNIPAQUE) IMAGE QUALITY:   Diagnostic FINDINGS: CTA NECK ARCH AND GREAT VESSELS: No significant plaque in the aortic arch. No stenosis in the subclavian arteries. VERTEBRAL ARTERIES: Left vertebral artery arises directly from the aortic arch. No stenosis or dissection. RIGHT CAROTID: No stenosis.    No dissection. LEFT CAROTID: No stenosis.    No dissection. NASCET criteria was used to determine the degree of internal carotid artery diameter stenosis. CTA BRAIN: INTERNAL CAROTID ARTERIES: No stenosis or occlusion. ANTERIOR CEREBRAL ARTERY CIRCULATION:  No stenosis or occlusion. MIDDLE CEREBRAL ARTERY CIRCULATION:  No stenosis or occlusion. DISTAL VERTEBRAL ARTERIES:  No stenosis or occlusion. BASILAR ARTERY:  No stenosis or occlusion. POSTERIOR CEREBRAL ARTERIES: No stenosis or occlusion. VENOUS STRUCTURES: Patent dural venous sinuses. NON VASCULAR ANATOMY BONY STRUCTURES:  No lytic or blastic lesion. SOFT TISSUES OF THE NECK: No mass or lymphadenopathy. THORACIC INLET: Right upper lobe  spiculated 3.4 cm nodule. Not significantly changed since the previous exam.     Impression: No hemodynamically significant stenosis, dissection or occlusion of the carotid or vertebral arteries or major vessels of the [place]. Findings were directly discussed with Liang Luong at  7:13 PM  . Workstation performed: [id number]     Procedure: XR chest 1 view portable  Result Date: 10/17/2024  Narrative: XR CHEST PORTABLE INDICATION: dizziness. Right upper lobe lung cancer COMPARISON: Chest radiograph 7/3/2024, PET/CT 8/5/2024 FINDINGS: Monitoring leads and clips project over the chest. Right upper lobe paramediastinal mass with associated opacity has slightly improved. No infiltrates. No pneumothorax or pleural effusion. Normal cardiomediastinal silhouette. Bones are unremarkable for age. Normal upper abdomen.     Impression: Right upper lobe paramediastinal mass has slightly improved. No active disease. Resident: Reg Head I, the attending radiologist, have reviewed the images and agree with the final report above. Workstation performed: [id number]     Procedure: CT head without contrast  Result Date: 10/16/2024  Narrative: CT BRAIN - WITHOUT CONTRAST INDICATION:   R lower quadrantanopsia. COMPARISON: Recent brain MRI 10/8/2024 TECHNIQUE:  CT examination of the brain was performed.  Multiplanar 2D reformatted images were created from the source data. Radiation dose length product (DLP) for this visit:  946 mGy-cm .  This examination, like all CT scans performed in the [facility], was performed utilizing techniques to minimize radiation dose exposure, including the use of iterative reconstruction and automated exposure control. IMAGE QUALITY:  Diagnostic. FINDINGS: PARENCHYMA: There is redemonstration of vasogenic edema involving the left parietal occipital lobes in this patient with known intracranial metastatic disease status post treatment. There is otherwise no definite evidence for  new areas of vasogenic edema. There is no CT evidence for large acute vascular distribution infarct. There is local mass effect on the posterior body and occipital horn of the left lateral ventricle with otherwise no significant midline shift. Basilar cisterns are patent. No acute intracranial hemorrhage. VENTRICLES AND EXTRA-AXIAL SPACES: No hydrocephalus. VISUALIZED ORBITS: Normal visualized orbits. PARANASAL SINUSES: Normal visualized paranasal sinuses. CALVARIUM AND EXTRACRANIAL SOFT TISSUES: Normal.     Impression: Redemonstration of left parieto-occipital vasogenic edema in this patient with known intracranial metastatic disease status post treatment. Correlate with recent brain MRI performed 10/8/2024. Otherwise no CT evidence for a new large acute vascular description infarct or acute intracranial hemorrhage. Workstation performed: [id number]     Procedure: MRI Brain BT w wo Contrast  Result Date: 10/9/2024  Narrative: MRI BRAIN WITH AND WITHOUT CONTRAST INDICATION: C79.31: Secondary malignant neoplasm of brain C34.91: Malignant neoplasm of unspecified part of right bronchus or lung. post SRS, brain metastasis COMPARISON: MRI brain BT with and without contrast 9/10/2024. CTA head and neck with and without contrast 9/9/2024. CT head without contrast 8/21/2024 MRI brain with and without contrast 7/30/2024, 3/28/2024. TECHNIQUE: Multiplanar, multisequence imaging of the brain and sella was performed before and after gadolinium administration. IV Contrast:  10 mL of Gadobutrol injection (SINGLE-DOSE) IMAGE QUALITY:   Diagnostic. FINDINGS: BRAIN PARENCHYMA: Multiple enhancing metastatic lesions as detailed below with comparison made to MRI brain with and without contrast 9/10/2024: - Tiny left anterolateral frontal lobe lesion (13:112), unchanged. - 0.6 cm left paramedian parietal lobe lesion (13:98), unchanged. - 0.3 cm left paramedian frontal lobe lesion (13:90), previously 0.4 cm. - 2.2 cm left occipital lobe  lesion with central necrosis (13:75), likely due to treatment related changes of radiation necrosis, unchanged. - 0.4 cm left temporal lobe lesion (13:51), previously 0.3 cm. - Tiny cerebellar vermis lesion (13:41), previously 0.4 cm. Unchanged enhancement in bilateral cranial nerve VII and both internal auditory canals, suspicious for leptomeningeal metastasis. No new enhancing lesion. Persistent diffuse perilesional vasogenic edema in left parietal lobe and left occipital lobe with mild mass effect on posterior aspect of left lateral ventricle. No midline shift. No acute intracranial hemorrhage. No diffusion weighted signal abnormality to suggest acute infarction. No abnormal hypoperfusion. Small scattered hyperintensities on T2/FLAIR imaging are noted in the periventricular and subcortical white matter demonstrating an appearance that is statistically most likely to represent mild microangiopathic change. VENTRICLES: Mild mass effect on posterior aspect of left lateral ventricle. No obstructive hydrocephalus. No acute intraventricular hemorrhage. SELLA AND PITUITARY GLAND:  Normal. ORBITS:  Normal. PARANASAL SINUSES:  Normal. VASCULATURE:  Evaluation of the major intracranial vasculature demonstrates appropriate flow voids. CALVARIUM AND SKULL BASE:  Normal. EXTRACRANIAL SOFT TISSUES:  Normal.     Impression: Mixed treatment response since MRI brain 9/10/2024 - Slightly increase size of left temporal lobe metastatic lesion. - Unchanged left occipital lobe metastatic lesion with evidence of radiation necrosis, left paramedian parietal lobe lesion, and tiny left anterolateral lobe frontal lobe lesion. - Unchanged enhancement in bilateral cranial nerve VII and both internal auditory canals, suspicious for leptomeningeal metastasis. - Decreased size of left paramedian frontal lobe lesion and tiny cerebellar vermis lesion. - No new enhancing intracranial metastasis. Persistent diffuse perilesional vasogenic edema in  "left parietal lobe and left occipital lobe with mild mass effect on posterior aspect of left lateral ventricle. The study was marked in EPIC for significant notification. Workstation performed: [id number]       35 minutes total time spent on 1/23/2025 in caring for this patient including obtaining/reviewing history, symptom assessment and management, medication review / adjustment, psychosocial support, reviewing / ordering tests, medicines, imaging, procedures, prognosis, supportive listening, anticipatory guidance, patient/family education, instructions for management, importance of adhering to treatment plan, risks/benefits of treatment(s), risk factor reduction, and documenting in the medical record. All of the patient's questions were answered during this discussion.    ZULEYMA Pollard  [facility]  [phone]    Portions of this document may have been created using dictation software and as such some \"sound alike\" terms may have been generated by the system. Do not hesitate to contact me with any questions or clarifications. .    "

## 2025-01-23 NOTE — TELEPHONE ENCOUNTER
Left vm for patient to inquire if home team could do home visit at 930 this morning due to her infusion appt. In [place] at 11.

## 2025-01-23 NOTE — PROGRESS NOTES
Keep alimta dose as written 700 mg per office  / with current bsa - 1.56 dose is 780 - per pharmacy bsa 1.47 didn't change by 10%   Offi

## 2025-01-23 NOTE — ASSESSMENT & PLAN NOTE
Current regimen:  Gabapentin 100mg AM, 100mg at noon, 300mg Bedtime  Tylenol 650mg PRN  Heating pad during the day  Failed therapies:  Celebrex 200mg BID  Oxycodone- felt like she had a headache from the medication  Would like to avoid opiates  Bowel regimen to prevent OIC:  Senna

## 2025-01-24 ENCOUNTER — TELEPHONE (OUTPATIENT)
Age: 75
End: 2025-01-24

## 2025-01-24 ENCOUNTER — APPOINTMENT (INPATIENT)
Dept: CT IMAGING | Facility: HOSPITAL | Age: 75
DRG: 181 | End: 2025-01-24
Payer: MEDICARE

## 2025-01-24 PROBLEM — C79.9 METASTATIC ADENOCARCINOMA (HCC): Status: ACTIVE | Noted: 2024-03-29

## 2025-01-24 PROBLEM — N30.00 ACUTE CYSTITIS WITHOUT HEMATURIA: Status: ACTIVE | Noted: 2025-01-24

## 2025-01-24 PROBLEM — A41.9 SEPSIS (HCC): Status: RESOLVED | Noted: 2024-11-26 | Resolved: 2025-01-24

## 2025-01-24 PROBLEM — N30.00 ACUTE CYSTITIS WITHOUT HEMATURIA: Status: RESOLVED | Noted: 2025-01-24 | Resolved: 2025-01-24

## 2025-01-24 PROBLEM — Z86.19 HISTORY OF PNEUMOCYSTIS JIROVECII PNEUMONIA: Status: ACTIVE | Noted: 2025-01-24

## 2025-01-24 PROBLEM — R65.10 SIRS (SYSTEMIC INFLAMMATORY RESPONSE SYNDROME) (HCC): Status: ACTIVE | Noted: 2024-11-26

## 2025-01-24 LAB
ANION GAP SERPL CALCULATED.3IONS-SCNC: 7 MMOL/L (ref 4–13)
B PARAP IS1001 DNA NPH QL NAA+NON-PROBE: NOT DETECTED
B PERT.PT PRMT NPH QL NAA+NON-PROBE: NOT DETECTED
BACTERIA UR QL AUTO: NORMAL /HPF
BACTERIA UR QL AUTO: NORMAL /HPF
BILIRUB UR QL STRIP: ABNORMAL
BILIRUB UR QL STRIP: NEGATIVE
BUN SERPL-MCNC: 20 MG/DL (ref 5–25)
C PNEUM DNA NPH QL NAA+NON-PROBE: NOT DETECTED
CALCIUM SERPL-MCNC: 7.7 MG/DL (ref 8.4–10.2)
CHLORIDE SERPL-SCNC: 109 MMOL/L (ref 96–108)
CLARITY UR: CLEAR
CLARITY UR: CLEAR
CO2 SERPL-SCNC: 22 MMOL/L (ref 21–32)
COLOR UR: ABNORMAL
COLOR UR: COLORLESS
CREAT SERPL-MCNC: 0.86 MG/DL (ref 0.6–1.3)
ERYTHROCYTE [DISTWIDTH] IN BLOOD BY AUTOMATED COUNT: 16.3 % (ref 11.6–15.1)
FLUAV RNA NPH QL NAA+NON-PROBE: NOT DETECTED
FLUBV RNA NPH QL NAA+NON-PROBE: NOT DETECTED
GFR SERPL CREATININE-BSD FRML MDRD: 66 ML/MIN/1.73SQ M
GLUCOSE SERPL-MCNC: 116 MG/DL (ref 65–140)
GLUCOSE UR STRIP-MCNC: NEGATIVE MG/DL
GLUCOSE UR STRIP-MCNC: NEGATIVE MG/DL
HADV DNA NPH QL NAA+NON-PROBE: NOT DETECTED
HCOV 229E RNA NPH QL NAA+NON-PROBE: NOT DETECTED
HCOV HKU1 RNA NPH QL NAA+NON-PROBE: NOT DETECTED
HCOV NL63 RNA NPH QL NAA+NON-PROBE: NOT DETECTED
HCOV OC43 RNA NPH QL NAA+NON-PROBE: NOT DETECTED
HCT VFR BLD AUTO: 28.7 % (ref 34.8–46.1)
HGB BLD-MCNC: 9 G/DL (ref 11.5–15.4)
HGB UR QL STRIP.AUTO: ABNORMAL
HGB UR QL STRIP.AUTO: ABNORMAL
HMPV RNA NPH QL NAA+NON-PROBE: NOT DETECTED
HPIV1 RNA NPH QL NAA+NON-PROBE: NOT DETECTED
HPIV2 RNA NPH QL NAA+NON-PROBE: NOT DETECTED
HPIV3 RNA NPH QL NAA+NON-PROBE: NOT DETECTED
HPIV4 RNA NPH QL NAA+NON-PROBE: NOT DETECTED
KETONES UR STRIP-MCNC: NEGATIVE MG/DL
KETONES UR STRIP-MCNC: NEGATIVE MG/DL
L PNEUMO1 AG UR QL IA.RAPID: NEGATIVE
LACTATE SERPL-SCNC: 1.9 MMOL/L (ref 0.5–2)
LACTATE SERPL-SCNC: 2 MMOL/L (ref 0.5–2)
LEUKOCYTE ESTERASE UR QL STRIP: NEGATIVE
LEUKOCYTE ESTERASE UR QL STRIP: NEGATIVE
M PNEUMO DNA NPH QL NAA+NON-PROBE: NOT DETECTED
MCH RBC QN AUTO: 30.7 PG (ref 26.8–34.3)
MCHC RBC AUTO-ENTMCNC: 31.4 G/DL (ref 31.4–37.4)
MCV RBC AUTO: 98 FL (ref 82–98)
NITRITE UR QL STRIP: NEGATIVE
NITRITE UR QL STRIP: NEGATIVE
NON-SQ EPI CELLS URNS QL MICRO: NORMAL /HPF
NON-SQ EPI CELLS URNS QL MICRO: NORMAL /HPF
PH UR STRIP.AUTO: 5 [PH]
PH UR STRIP.AUTO: 5.5 [PH]
PLATELET # BLD AUTO: 181 THOUSANDS/UL (ref 149–390)
PLATELET # BLD AUTO: 206 THOUSANDS/UL (ref 149–390)
PMV BLD AUTO: 9 FL (ref 8.9–12.7)
PMV BLD AUTO: 9.2 FL (ref 8.9–12.7)
POTASSIUM SERPL-SCNC: 4.4 MMOL/L (ref 3.5–5.3)
PROCALCITONIN SERPL-MCNC: 29.04 NG/ML
PROT UR STRIP-MCNC: ABNORMAL MG/DL
PROT UR STRIP-MCNC: ABNORMAL MG/DL
RBC # BLD AUTO: 2.93 MILLION/UL (ref 3.81–5.12)
RBC #/AREA URNS AUTO: NORMAL /HPF
RBC #/AREA URNS AUTO: NORMAL /HPF
RSV RNA NPH QL NAA+NON-PROBE: NOT DETECTED
RV+EV RNA NPH QL NAA+NON-PROBE: NOT DETECTED
S PNEUM AG UR QL: NEGATIVE
SARS-COV-2 RNA NPH QL NAA+NON-PROBE: NOT DETECTED
SODIUM SERPL-SCNC: 138 MMOL/L (ref 135–147)
SP GR UR STRIP.AUTO: 1.02 (ref 1–1.03)
SP GR UR STRIP.AUTO: 1.03 (ref 1–1.03)
UROBILINOGEN UR STRIP-ACNC: 4 MG/DL
UROBILINOGEN UR STRIP-ACNC: <2 MG/DL
WBC # BLD AUTO: 7.02 THOUSAND/UL (ref 4.31–10.16)
WBC #/AREA URNS AUTO: NORMAL /HPF
WBC #/AREA URNS AUTO: NORMAL /HPF

## 2025-01-24 PROCEDURE — 80048 BASIC METABOLIC PNL TOTAL CA: CPT

## 2025-01-24 PROCEDURE — 71275 CT ANGIOGRAPHY CHEST: CPT

## 2025-01-24 PROCEDURE — 83605 ASSAY OF LACTIC ACID: CPT

## 2025-01-24 PROCEDURE — 85049 AUTOMATED PLATELET COUNT: CPT

## 2025-01-24 PROCEDURE — 0202U NFCT DS 22 TRGT SARS-COV-2: CPT | Performed by: INTERNAL MEDICINE

## 2025-01-24 PROCEDURE — 87081 CULTURE SCREEN ONLY: CPT

## 2025-01-24 PROCEDURE — 85027 COMPLETE CBC AUTOMATED: CPT

## 2025-01-24 PROCEDURE — 81001 URINALYSIS AUTO W/SCOPE: CPT

## 2025-01-24 PROCEDURE — 87449 NOS EACH ORGANISM AG IA: CPT

## 2025-01-24 PROCEDURE — 83605 ASSAY OF LACTIC ACID: CPT | Performed by: PHYSICIAN ASSISTANT

## 2025-01-24 PROCEDURE — 99223 1ST HOSP IP/OBS HIGH 75: CPT | Performed by: INTERNAL MEDICINE

## 2025-01-24 PROCEDURE — 84145 PROCALCITONIN (PCT): CPT

## 2025-01-24 PROCEDURE — 36415 COLL VENOUS BLD VENIPUNCTURE: CPT | Performed by: PHYSICIAN ASSISTANT

## 2025-01-24 PROCEDURE — 99285 EMERGENCY DEPT VISIT HI MDM: CPT | Performed by: INTERNAL MEDICINE

## 2025-01-24 RX ORDER — PANTOPRAZOLE SODIUM 40 MG/1
40 TABLET, DELAYED RELEASE ORAL
Status: DISCONTINUED | OUTPATIENT
Start: 2025-01-24 | End: 2025-01-28 | Stop reason: HOSPADM

## 2025-01-24 RX ORDER — LEVOTHYROXINE SODIUM 50 UG/1
50 TABLET ORAL
Status: DISCONTINUED | OUTPATIENT
Start: 2025-01-25 | End: 2025-01-24

## 2025-01-24 RX ORDER — ENOXAPARIN SODIUM 100 MG/ML
40 INJECTION SUBCUTANEOUS DAILY
Status: DISCONTINUED | OUTPATIENT
Start: 2025-01-24 | End: 2025-01-24

## 2025-01-24 RX ORDER — SULFAMETHOXAZOLE AND TRIMETHOPRIM 800; 160 MG/1; MG/1
1 TABLET ORAL DAILY
Status: DISCONTINUED | OUTPATIENT
Start: 2025-01-25 | End: 2025-01-24

## 2025-01-24 RX ORDER — SULFAMETHOXAZOLE AND TRIMETHOPRIM 800; 160 MG/1; MG/1
1 TABLET ORAL 3 TIMES WEEKLY
Status: DISCONTINUED | OUTPATIENT
Start: 2025-01-27 | End: 2025-01-28 | Stop reason: HOSPADM

## 2025-01-24 RX ORDER — ACETAMINOPHEN 325 MG/1
650 TABLET ORAL EVERY 6 HOURS PRN
Status: DISCONTINUED | OUTPATIENT
Start: 2025-01-24 | End: 2025-01-24

## 2025-01-24 RX ORDER — SULFAMETHOXAZOLE AND TRIMETHOPRIM 800; 160 MG/1; MG/1
1 TABLET ORAL DAILY
Status: DISCONTINUED | OUTPATIENT
Start: 2025-01-24 | End: 2025-01-24

## 2025-01-24 RX ORDER — GABAPENTIN 100 MG/1
100 CAPSULE ORAL 3 TIMES DAILY
Status: DISCONTINUED | OUTPATIENT
Start: 2025-01-24 | End: 2025-01-24

## 2025-01-24 RX ORDER — GABAPENTIN 100 MG/1
100 CAPSULE ORAL 3 TIMES DAILY
Status: DISCONTINUED | OUTPATIENT
Start: 2025-01-24 | End: 2025-01-28 | Stop reason: HOSPADM

## 2025-01-24 RX ORDER — LEVETIRACETAM 500 MG/1
1000 TABLET ORAL EVERY 12 HOURS
Status: DISCONTINUED | OUTPATIENT
Start: 2025-01-24 | End: 2025-01-28 | Stop reason: HOSPADM

## 2025-01-24 RX ORDER — GABAPENTIN 100 MG/1
200 CAPSULE ORAL
Status: DISCONTINUED | OUTPATIENT
Start: 2025-01-24 | End: 2025-01-28 | Stop reason: HOSPADM

## 2025-01-24 RX ORDER — DEXAMETHASONE 2 MG/1
2 TABLET ORAL 2 TIMES DAILY
Status: DISCONTINUED | OUTPATIENT
Start: 2025-01-24 | End: 2025-01-28 | Stop reason: HOSPADM

## 2025-01-24 RX ORDER — GABAPENTIN 100 MG/1
100 CAPSULE ORAL ONCE
Status: DISCONTINUED | OUTPATIENT
Start: 2025-01-24 | End: 2025-01-28 | Stop reason: HOSPADM

## 2025-01-24 RX ORDER — AMLODIPINE BESYLATE 5 MG/1
5 TABLET ORAL DAILY
Status: DISCONTINUED | OUTPATIENT
Start: 2025-01-25 | End: 2025-01-24

## 2025-01-24 RX ORDER — ACETAMINOPHEN 325 MG/1
975 TABLET ORAL EVERY 6 HOURS PRN
Status: DISCONTINUED | OUTPATIENT
Start: 2025-01-24 | End: 2025-01-28 | Stop reason: HOSPADM

## 2025-01-24 RX ORDER — LEVOTHYROXINE SODIUM 50 UG/1
50 TABLET ORAL
Status: DISCONTINUED | OUTPATIENT
Start: 2025-01-24 | End: 2025-01-28 | Stop reason: HOSPADM

## 2025-01-24 RX ORDER — FOLIC ACID 1 MG/1
1 TABLET ORAL DAILY
Status: DISCONTINUED | OUTPATIENT
Start: 2025-01-24 | End: 2025-01-28 | Stop reason: HOSPADM

## 2025-01-24 RX ORDER — SODIUM CHLORIDE, SODIUM GLUCONATE, SODIUM ACETATE, POTASSIUM CHLORIDE, MAGNESIUM CHLORIDE, SODIUM PHOSPHATE, DIBASIC, AND POTASSIUM PHOSPHATE .53; .5; .37; .037; .03; .012; .00082 G/100ML; G/100ML; G/100ML; G/100ML; G/100ML; G/100ML; G/100ML
75 INJECTION, SOLUTION INTRAVENOUS CONTINUOUS
Status: DISCONTINUED | OUTPATIENT
Start: 2025-01-24 | End: 2025-01-26

## 2025-01-24 RX ORDER — SENNOSIDES 8.6 MG
2 TABLET ORAL DAILY
Status: DISCONTINUED | OUTPATIENT
Start: 2025-01-24 | End: 2025-01-28 | Stop reason: HOSPADM

## 2025-01-24 RX ORDER — DEXAMETHASONE 2 MG/1
2 TABLET ORAL DAILY
Status: DISCONTINUED | OUTPATIENT
Start: 2025-01-29 | End: 2025-01-28 | Stop reason: HOSPADM

## 2025-01-24 RX ORDER — AMLODIPINE BESYLATE 5 MG/1
5 TABLET ORAL DAILY
Status: DISCONTINUED | OUTPATIENT
Start: 2025-01-24 | End: 2025-01-28 | Stop reason: HOSPADM

## 2025-01-24 RX ORDER — FOLIC ACID 1 MG/1
1 TABLET ORAL DAILY
Status: DISCONTINUED | OUTPATIENT
Start: 2025-01-25 | End: 2025-01-24

## 2025-01-24 RX ADMIN — ACETAMINOPHEN 650 MG: 325 TABLET, FILM COATED ORAL at 02:36

## 2025-01-24 RX ADMIN — ACETAMINOPHEN 325 MG: 325 TABLET, FILM COATED ORAL at 03:25

## 2025-01-24 RX ADMIN — CYANOCOBALAMIN TAB 500 MCG 1000 MCG: 500 TAB at 08:50

## 2025-01-24 RX ADMIN — GABAPENTIN 100 MG: 100 CAPSULE ORAL at 21:09

## 2025-01-24 RX ADMIN — SODIUM CHLORIDE, SODIUM GLUCONATE, SODIUM ACETATE, POTASSIUM CHLORIDE, MAGNESIUM CHLORIDE, SODIUM PHOSPHATE, DIBASIC, AND POTASSIUM PHOSPHATE 150 ML/HR: .53; .5; .37; .037; .03; .012; .00082 INJECTION, SOLUTION INTRAVENOUS at 03:06

## 2025-01-24 RX ADMIN — ACETAMINOPHEN 975 MG: 325 TABLET, FILM COATED ORAL at 08:35

## 2025-01-24 RX ADMIN — VANCOMYCIN HYDROCHLORIDE 1000 MG: 5 INJECTION, POWDER, LYOPHILIZED, FOR SOLUTION INTRAVENOUS at 16:35

## 2025-01-24 RX ADMIN — PANTOPRAZOLE SODIUM 40 MG: 40 TABLET, DELAYED RELEASE ORAL at 06:05

## 2025-01-24 RX ADMIN — LEVOTHYROXINE SODIUM 50 MCG: 0.05 TABLET ORAL at 06:11

## 2025-01-24 RX ADMIN — AMLODIPINE BESYLATE 5 MG: 5 TABLET ORAL at 08:35

## 2025-01-24 RX ADMIN — IOHEXOL 50 ML: 350 INJECTION, SOLUTION INTRAVENOUS at 13:25

## 2025-01-24 RX ADMIN — VANCOMYCIN HYDROCHLORIDE 1500 MG: 5 INJECTION, POWDER, LYOPHILIZED, FOR SOLUTION INTRAVENOUS at 03:06

## 2025-01-24 RX ADMIN — GABAPENTIN 200 MG: 100 CAPSULE ORAL at 21:10

## 2025-01-24 RX ADMIN — ACETAMINOPHEN 975 MG: 325 TABLET, FILM COATED ORAL at 23:18

## 2025-01-24 RX ADMIN — SULFAMETHOXAZOLE AND TRIMETHOPRIM 1 TABLET: 800; 160 TABLET ORAL at 08:35

## 2025-01-24 RX ADMIN — LEVETIRACETAM 1000 MG: 500 TABLET, FILM COATED ORAL at 21:10

## 2025-01-24 RX ADMIN — FOLIC ACID 1 MG: 1 TABLET ORAL at 08:36

## 2025-01-24 RX ADMIN — GABAPENTIN 100 MG: 100 CAPSULE ORAL at 16:36

## 2025-01-24 RX ADMIN — DEXAMETHASONE 2 MG: 2 TABLET ORAL at 21:09

## 2025-01-24 RX ADMIN — SODIUM CHLORIDE, SODIUM GLUCONATE, SODIUM ACETATE, POTASSIUM CHLORIDE, MAGNESIUM CHLORIDE, SODIUM PHOSPHATE, DIBASIC, AND POTASSIUM PHOSPHATE 75 ML/HR: .53; .5; .37; .037; .03; .012; .00082 INJECTION, SOLUTION INTRAVENOUS at 18:40

## 2025-01-24 RX ADMIN — SENNOSIDES 17.2 MG: 8.6 TABLET, FILM COATED ORAL at 08:36

## 2025-01-24 RX ADMIN — GABAPENTIN 100 MG: 100 CAPSULE ORAL at 08:37

## 2025-01-24 RX ADMIN — ACETAMINOPHEN 975 MG: 325 TABLET, FILM COATED ORAL at 14:05

## 2025-01-24 RX ADMIN — CEFTRIAXONE SODIUM 2000 MG: 10 INJECTION, POWDER, FOR SOLUTION INTRAVENOUS at 10:53

## 2025-01-24 NOTE — ASSESSMENT & PLAN NOTE
Patient most recently received chemotherapy on 1/23.  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  Imaging currently showing progressive disease although this may be due to interruptions in her therapy.  Progressive back discomfort noted which I suspect may be related to her disease.  No port in place.  Antibiotics as above for now  Follow-up pending cultures  Follow-up CT PE  Trend fever curve/vitals  Repeat CBC/chemistry tomorrow  Follow-up oncology evaluation  Continue PJP prophylaxis while on steroids  Monitor for new/developing symptoms on exam  Additional interventions pending clinical course

## 2025-01-24 NOTE — QUICK NOTE
Patient was examined at bedside this morning.  Patient is alert oriented x 3.  The nurse reported the patient having back pain that was responsive to Tylenol.  Nurse also tried to wean her off oxygen however she desats to 88% and currently she is on 2 L of O2>> 95%, Patient reports that is not normal for her and she does not use oxygen supplements at home.    Patient is a 74-year-old female with PMH of HLD, HTN, metastatic lung cancer, previous DVT/PE, previous PCP infection senting today due to fever and fatigue.    Patient reports fever of 103 after her chemotherapy session yesterday morning.  Patient denies cough, dysphagia, chest pain, diarrhea or constipation, pain or burning while urination, lower leg edema.    Patient reports acute on chronic severe intrascapular and mid back pain that started 10 days ago, combined with SOB and fatigue.  Patient follows up with palliative for pain management.  Patient was advised to go to the ED for any fever after chemotherapy.      In the ED patient met sepsis criteria.  Patient was started on cefepime 2 g then switched to vancomycin.  Hemoglobin was 10.1>> 9  WBC 2.3>> 7  RBC 3.3>> 2.9  Platelets 206>> 181  Lactic acid 2.3>> 2  Pro-Du 8.3>> 29  Calcium 7.7  CR 0.86  GFR 66  INR 1.24  UA positive for trace protein and blood, urine culture pending.      CT chest/abdominal/pelvis with contrast:  1.  Findings consistent with disease progression, with rapid interval enlargement of left lung pulmonary nodules when compared to the CT 1/14/2025, increase in right upper lobe mass, and increase in right renal mass. There has been interval decrease in   size of right ischiorectal fossa mass when compared to 11/24/2024.   2.  Bilateral perinephric stranding, with right urothelial thickening, concerning for urinary tract infection   3.  Mild T5 compression deformity, appears subacute     On examination, patient was not in acute distress however she was pale and ill-appearing.  Cardiac  normal rhythm and rate and heart sounds without murmurs or gallops  Lungs clear to auscultation without wheezing.  Abdomen soft nontender.  No lower leg edema.    -ID consulted  -Oncology consult  -Neutropenic precautions-  -CTA pending  -Urine strep and Legionella pending.  -Rocephin 2 g was added with continuation of vancomycin until culture results.

## 2025-01-24 NOTE — ASSESSMENT & PLAN NOTE
High fever, leukopenia tachycardia.  UA unremarkable.  Patient predominantly with persistent back pain in the mid back along with some discomfort in the shoulders tracking across.  She has no port or intravascular devices.  Imaging reviewed with progressive burden of disease.  Patient had chemotherapy on 1/23.  Differential at this time for patient's episode of fever includes occult bacteremia, progressing cancer/tumor fever, chemotherapy-induced fever.  I suspect that her current back pain is related to the progression of her disease but also compression fracture noted at T5 which seems to be developing.  Blood cultures pending.  COVID/flu testing negative.  UA unremarkable.  Continue on vancomycin for now  Pharmacy consult for vancomycin monitoring  Will switch to ceftriaxone 2 g every 24 hours for now  Follow-up pending blood cultures  Follow-up pending urinary antigens  Follow-up pending MRSA culture  Follow-up CT PE imaging as well  Continue to trend fever curve/vitals  Repeat CBC/chemistry tomorrow to monitor treatment response/dosing  Follow-up oncology evaluation  Monitor for new/developing symptoms on exam.    Will continue PJP prophylaxis given ongoing steroid use  Additional supportive cares per primary  Additional interventions pending clinical course  If blood cultures remain negative at 48 hours, anticipate discontinuing antibiotic.

## 2025-01-24 NOTE — ED NOTES
Placed pt on 3L NC. Pt 91% on RA, Pt feeling some increased SOB.      Madhuri Bell RN  01/24/25 [id number]

## 2025-01-24 NOTE — SEPSIS NOTE
"  Sepsis Note   Ayla Nye 74 y.o. female MRN: [id number]  Unit/Bed#: ED-41 Encounter: [id number]       Initial Sepsis Screening       Row Name 01/23/25 2224                Is the patient's history suggestive of a new or worsening infection? Yes (Proceed)  -RG        Suspected source of infection pneumonia  -RG        Indicate SIRS criteria Hyperthemia > 38.3C (100.9F) OR Hypothermia <36C (96.8F);Tachycardia > 90 bpm;Leukocytosis (WBC > 39722 IJL) OR Leukopenia (WBC <4000 IJL) OR Bandemia (WBC >10% bands)  -RG        Are two or more of the above signs & symptoms of infection both present and new to the patient? Yes (Proceed)  -RG        Assess for evidence of organ dysfunction: Are any of the below criteria present within 6 hours of suspected infection and SIRS criteria that are NOT considered to be chronic conditions? Lactate > 2.0  -RG        Date of presentation of severe sepsis 01/23/25  -RG        Time of presentation of severe sepsis 2225  -RG        Sepsis Note: Click \"NEXT\" below (NOT \"close\") to generate sepsis note based on above information. --                  User Key  (r) = Recorded By, (t) = Taken By, (c) = Cosigned By      Initials Name Provider Type    CHIKA Patten PA-C Physician Assistant                    Default Flowsheet Data (Last 720 Hours)       Sepsis Reassess       Row Name 01/23/25 2327                   Repeat Volume Status and Tissue Perfusion Assessment Performed    Date of Reassessment: 01/23/25  -        Time of Reassessment: 2325 -RG        Sepsis Reassessment Note: Click \"NEXT\" below (NOT \"close\") to generate sepsis reassessment note. YES (proceed by clicking \"NEXT\")  -RG        Repeat Volume Status and Tissue Perfusion Assessment Performed --                  User Key  (r) = Recorded By, (t) = Taken By, (c) = Cosigned By      Initials Name Provider Type    CHIKA Patten PA-C Physician Assistant                    There is no height or weight on file to calculate " BMI.  Wt Readings from Last 1 Encounters:   01/23/25 56.1 kg (123 lb 10.9 oz)     IBW (Ideal Body Weight): 50.12 kg    Ideal body weight: 50.1 kg (110 lb 7.9 oz)  Adjusted ideal body weight: 52.5 kg (115 lb 12.3 oz)

## 2025-01-24 NOTE — ASSESSMENT & PLAN NOTE
Follows with Dr. Castro- medical oncology  Completed cycle 2 chemo on 11/14.   Infusion 1/23- carboplatin and pemetrexed, will stop carboplatin  Will start on low dose Avastin.  SBRT treatment on 1/16  Next hem/onc appt 1/27  MRI ordered for 3/27.

## 2025-01-24 NOTE — DISCHARGE INSTRUCTIONS
Reading Physician Reading Date Result Priority   Felipe Wood MD  [phone]     1/23/2025 STAT     Narrative & Impression  CT CHEST, ABDOMEN AND PELVIS WITH IV CONTRAST     INDICATION: fever and bilateral lumbar/flank pain; sepsis.     COMPARISON: CT 1/14/2025, 12/30/2024, 11/24/2024, 10/22/2024     TECHNIQUE: CT examination of the chest, abdomen and pelvis was performed. Multiplanar 2D reformatted images were created from the source data.     This examination, like all CT scans performed in the [facility], was performed utilizing techniques to minimize radiation dose exposure, including the use of iterative reconstruction and automated exposure control. Radiation dose length   product (DLP) for this visit: 315 mGy-cm     IV Contrast: 90 mL of iohexol (OMNIPAQUE)  Enteric Contrast: Not administered.     FINDINGS:     CHEST     LUNGS: Right upper lobe mass measures 3.2 x 2.4 cm, shows progressive enlargement. There is currently more abutment with the adjacent mediastinum then when compared to the most recent prior.Parenchymal scarring in the right upper lobe and right lower lobe superior segments, likely radiation changes.     16 mm lingular nodule series 301/82, 8 mm on most recent prior  15 mm left lower lobe nodule series 301/82, 13 mm on most recent prior     PLEURA: Unremarkable.     HEART/GREAT VESSELS: Heart is unremarkable for patient's age. No thoracic aortic aneurysm.     MEDIASTINUM AND SUDEEP: Unremarkable.     CHEST WALL AND LOWER NECK: Unremarkable.     ABDOMEN     LIVER/BILIARY TREE: Unremarkable.     GALLBLADDER: No calcified gallstones. No pericholecystic inflammatory change.     SPLEEN: Unremarkable.     PANCREAS: Unremarkable.     ADRENAL GLANDS: Unremarkable.     KIDNEYS/URETERS: Right upper pole ill-defined hypoenhancing mass measures 5.5 x 4.2 cm on series 301/108, this measured as large as 4.5 cm on the most recent prior CT abdomen based off of my measurement     Interval  increase of bilateral perinephric stranding, even when compared to the most recent chest CT.     There is mild right urothelial thickening in the renal pelvis     STOMACH AND BOWEL: Colonic diverticulosis without findings of acute diverticulitis.     APPENDIX: No findings to suggest appendicitis.     ABDOMINOPELVIC CAVITY: 3.6 x 2.5 cm right ischiorectal fossa mass, series 301/227, decreased in size     VESSELS: Unremarkable for patient's age.     PELVIS     REPRODUCTIVE ORGANS: Unremarkable for patient's age.     URINARY BLADDER: Unremarkable.     ABDOMINAL WALL/INGUINAL REGIONS: Unremarkable.     BONES: Mild T5 superior endplate compression, in retrospect, there may have been a mild superior endplate compression on the most recent chest CT, which is now more pronounced, with surrounding sclerosis suggesting healing.     IMPRESSION:     1.  Findings consistent with disease progression, with rapid interval enlargement of left lung pulmonary nodules when compared to the CT 1/14/2025, increase in right upper lobe mass, and increase in right renal mass. There has been interval decrease in   size of right ischiorectal fossa mass when compared to 11/24/2024.  2.  Bilateral perinephric stranding, with right urothelial thickening, concerning for urinary tract infection  3.  Mild T5 compression deformity, appears subacute     The study was marked in EPIC for immediate notification.        Workstation performed: [id number]

## 2025-01-24 NOTE — CONSULTS
Consultation - Infectious Disease   Name: Ayla Nye 74 y.o. female I MRN: [id number]  Unit/Bed#: ED-41 I Date of Admission: 1/23/2025   Date of Service: 1/24/2025 I Hospital Day: 0   Inpatient consult to Infectious Diseases  Consult performed by: Magnolia Stanton MD  Consult ordered by: Curtis Elizabeth MD        Physician Requesting Evaluation: Yulissa Calderón MD   Reason for Evaluation / Principal Problem: Fever      Assessment & Plan  SIRS (systemic inflammatory response syndrome) (HCC)  High fever, leukopenia tachycardia.  UA unremarkable.  Patient predominantly with persistent back pain in the mid back along with some discomfort in the shoulders tracking across.  She has no port or intravascular devices.  Imaging reviewed with progressive burden of disease.  Patient had chemotherapy on 1/23.  Differential at this time for patient's episode of fever includes occult bacteremia, progressing cancer/tumor fever, chemotherapy-induced fever.  I suspect that her current back pain is related to the progression of her disease but also compression fracture noted at T5 which seems to be developing.  Blood cultures pending.  COVID/flu testing negative.  UA unremarkable.  Continue on vancomycin for now  Pharmacy consult for vancomycin monitoring  Will switch to ceftriaxone 2 g every 24 hours for now  Follow-up pending blood cultures  Follow-up pending urinary antigens  Follow-up pending MRSA culture  Follow-up CT PE imaging as well  Continue to trend fever curve/vitals  Repeat CBC/chemistry tomorrow to monitor treatment response/dosing  Follow-up oncology evaluation  Monitor for new/developing symptoms on exam.    Will continue PJP prophylaxis given ongoing steroid use  Additional supportive cares per primary  Additional interventions pending clinical course  If blood cultures remain negative at 48 hours, anticipate discontinuing antibiotic.  Metastatic adenocarcinoma (HCC)  Patient most recently received  chemotherapy on 1/23.  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  Imaging currently showing progressive disease although this may be due to interruptions in her therapy.  Progressive back discomfort noted which I suspect may be related to her disease.  No port in place.  Antibiotics as above for now  Follow-up pending cultures  Follow-up CT PE  Trend fever curve/vitals  Repeat CBC/chemistry tomorrow  Follow-up oncology evaluation  Continue PJP prophylaxis while on steroids  Monitor for new/developing symptoms on exam  Additional interventions pending clinical course  History of pulmonary embolism  Diagnosed on last admission in December.  Likely in the setting of patient's metastatic cancer.  Ongoing anticoagulation as per primary.  Will follow-up CT PE imaging.  Additional care as above.  History of Pneumocystis jirovecii pneumonia  Diagnosed on last admission with patient's progressive respiratory symptoms despite antibiotics however course also complicated by possibility of progressing cancer as well as immunotherapy induced pneumonitis.  Patient completed 21-day course of empiric therapy.  She remains on at least 4 mg of Decadron a day.  Continue on Bactrim prophylaxis  Prophylaxis dosing adjusted for 3 times weekly  Continue antibiotics otherwise as above  Monitor respiratory status  Trend fever curve/vitals    Above plan discussed in detail with the patient and her daughters at bedside  Above plan discussed in detail with primary service who is aware of plans for adjustments to antibiotic, following up current cultures, differential and potential for discontinuation of antibiotic if cultures negative    ID consult service will reevaluate this patient again over the weekend as needed.  Please contact ID attending on call if questions in the interim.    HISTORY OF PRESENT ILLNESS:  HPI: Ayla Nye is a 74 y.o. year old female who is actually known to me from her last  admission in the beginning of December.  This patient has adenocarcinoma of the lung with metastases to the brain and abdomen on chemotherapy.  At that time she had presented at the end of November with night sweats, nonproductive cough, fevers and chills.  She was found to have pulmonary emboli and a right sided consolidation suspicious for pneumonia.  She was on prolonged course of antibiotics and had undergone bronchoscopy.  She failed to improve over the course of admission and was ultimately started on steroids with subsequent improvement.  Differential at the time included radiation pneumonitis, cryptogenic organizing pneumonia as well as lymphangitic spread of her malignancy.  Patient also however has been on baseline chronic steroids with Decadron due to issues with inflammation in the head surrounding her lesions previously.  So additional testing was done for pneumocystis and patient did ultimately have a positive smear and Fungitell.  She was treated with a 21-day course of atovaquone.  She was later transitioned to Bactrim prophylaxis while she remains on relatively higher doses of steroids.  She continues to undergo a prolonged taper with oncology as an outpatient.  It was also noted on that admission to the patient had just received Keytruda as part of her November infusion and so the question remained about Keytruda induced pneumonitis contributing to her presentation.  Since that time the patient completed course of atovaquone and has remained on Bactrim on Monday/Wednesday/Fridays.  She reports that over the last 2 weeks though she is been having progressive back discomfort in the mid back.  She is also noticing tracking back discomfort down to her shoulders.  She has not been having any fevers.  She underwent chemotherapy yesterday and does not have a chronic port in place.  It was abruptly after that then she developed a fever to 103.  This prompted her to come into the hospital.  She reports  previous reaction to this chemo regimen again leading to hospitalization.  She otherwise denied having any nausea, vomiting, chest pain, shortness of breath, abdominal discomfort, pain with urination, blood in the urine, new areas of rashes or skin changes.  Patient noted to have a fever yesterday on admission.  White blood cell count 7.0.  She has been on Decadron 2 mg twice a day which she has tolerated and done well within the past.  Blood cultures pending.  MRSA culture, Legionella, pneumococcal antigen pending.  COVID and flu testing negative.  Strep PCR negative.  CT imaging was done of the chest/abdomen/pelvis.  Reviewed images at bedside with patient's daughter personally in PACS.  Chest imaging again showing these changes seen on the right but then the progressive change more so seen on the right renal mass which seems to be abutting directly under the diaphragm when I suspect is causing some degree of patient's symptoms particularly across the upper back.  There is also notably a developing compression fracture compared to the images 2 weeks ago.  It seems to be healing on today's imaging.  PE study pending.  Vitals otherwise largely stable.  Reviewed timeline for chemotherapy and history with patient and her daughters.  We discussed at this point maintaining on current antibiotics and following up pending cultures but again no definitive source of infection at this time.  We discussed the possibility of progressing pain likely from the progressive cancer but also fever related to her recent chemotherapy.  We are consulted for further assistance with management.    REVIEW OF SYSTEMS:  A complete 12 point system-based review of systems is negative other than that noted in the HPI.    PAST MEDICAL HISTORY:  Past Medical History:   Diagnosis Date    Allergic 2022    Allergic to neomiacin and cephalexin    Cancer (HCC)     lung cancer    Cancer (HCC)     mets to brain    Cancer (HCC)     perianal mass     Cataract 2023    Due to have durgery 2024    Essential thrombocytosis (HCC)     controlled with medication    History of transfusion     Hyperlipidemia     Hypertension     Mass in chest     Nodular goiter     Osteoporosis     Peripheral neuropathy     Pneumonia Oct 16, 2023    Also  2024    Psoriasis     SIRS (systemic inflammatory response syndrome) (HCC) 2024     Past Surgical History:   Procedure Laterality Date    APPENDECTOMY      BREAST CYST EXCISION Right     COLONOSCOPY  10/31/2018    DXA PROCEDURE (HISTORICAL)  2017    IR BIOPSY OTHER  2024    IR LUMBAR PUNCTURE  2024    MAMMO (HISTORICAL)      18    MAMMO NEEDLE LOCALIZATION RIGHT (ALL INC) Right 2009    SKIN LESION EXCISION      bridge of nose       FAMILY HISTORY:  Non-contributory    SOCIAL HISTORY:  Social History   Social History     Substance and Sexual Activity   Alcohol Use Yes    Alcohol/week: 5.0 standard drinks of alcohol    Types: 5 Glasses of wine per week     Social History     Substance and Sexual Activity   Drug Use Never     Social History     Tobacco Use   Smoking Status Former    Current packs/day: 1.00    Average packs/day: 1 pack/day for 59.1 years (59.1 ttl pk-yrs)    Types: Cigarettes    Start date:     Passive exposure: Past   Smokeless Tobacco Never   Tobacco Comments    Quit        ALLERGIES:  Allergies   Allergen Reactions    Doxycycline Headache    Keflex [Cephalexin] Rash    Neomycin-Polymyxin-Dexameth Eye Swelling       MEDICATIONS:  All current active medications have been reviewed.    PHYSICAL EXAM:  Temp:  [98.5 °F (36.9 °C)-101.2 °F (38.4 °C)] 98.5 °F (36.9 °C)  HR:  [] 85  Resp:  [18-20] 18  BP: (102-162)/(56-82) 119/68  SpO2:  [88 %-97 %] 94 %  Temp (24hrs), Av.9 °F (37.7 °C), Min:98.5 °F (36.9 °C), Max:101.2 °F (38.4 °C)  Current: Temperature: 98.5 °F (36.9 °C)    Intake/Output Summary (Last 24 hours) at 2025 1323  Last data filed at 2025  1123  Gross per 24 hour   Intake 2450 ml   Output --   Net 2450 ml       General Appearance:  Chronically ill-appearing, nontoxic and in no acute distress.  Patient is able to provide a very detailed history.   Head:  Normocephalic, without obvious abnormality, atraumatic   Eyes:  Conjunctiva pink and sclera anicteric, both eyes   Nose: Nares normal, mucosa normal, no drainage   Throat: Oropharynx moist without lesions   Neck: Supple, symmetrical, no adenopathy, no tenderness/mass/nodules   Back:   Symmetric, no curvature, ROM normal, no CVA tenderness; there is pinpoint tenderness in the mid back on palpation.   Lungs:   Clear to auscultation bilaterally, respirations unlabored on nasal cannula   Chest Wall:  No tenderness or deformity   Heart:  RRR; no murmur, rub or gallop noted   Abdomen:   Soft, non-tender, non-distended, positive bowel sounds    Extremities: No cyanosis, clubbing or edema   Skin: No rashes or lesions. No draining wounds noted.   Lymph nodes: Cervical, supraclavicular nodes normal   Neurologic: Alert and oriented times 3, extremity strength 5/5 and symmetric       LABS, IMAGING, & OTHER STUDIES:  In completing this consult I have performed an extensive review of the medical records in epic including review of the notes, radiographs, and laboratory results as detailed below.     Lab Results:  I have personally reviewed pertinent labs.  Comments/Interpretations: White blood cell count 7.0 today.    Results from last 7 days   Lab Units 01/24/25  0544 01/24/25  0313 01/23/25 2145 01/18/25  0907   WBC Thousand/uL 7.02  --  2.36* 12.14*   HEMOGLOBIN g/dL 9.0*  --  10.1* 10.2*   PLATELETS Thousands/uL 181 206 185 289     Results from last 7 days   Lab Units 01/24/25  0544 01/23/25  2145 01/18/25  0907   POTASSIUM mmol/L 4.4 3.7 4.2   CHLORIDE mmol/L 109* 107 105   CO2 mmol/L 22 23 26   BUN mg/dL 20 21 21   CREATININE mg/dL 0.86 0.98 0.97   EGFR ml/min/1.73sq m 66 57 57   CALCIUM mg/dL 7.7* 8.8 9.0    AST U/L  --  19 10*   ALT U/L  --  15 9   ALK PHOS U/L  --  52 38     Results from last 7 days   Lab Units 01/23/25  2134   BLOOD CULTURE  Received in Microbiology Lab. Culture in Progress.  Received in Microbiology Lab. Culture in Progress.       Imaging Studies:   I have personally reviewed pertinent imaging study reports and images in PACS.  Comments/Interpretations:  CT imaging from this admission of the chest/abdomen/pelvis reviewed with patient and her daughters at bedside.  Progressive renal lesion noted along with the right lung changes.  Reviewed CT imaging from 1/14 and also imaging from last admission.  Last admission imaging shows peripheral groundglass changes particularly on the right which are not seen/have resolved on the current images.    Other Studies:   I have personally reviewed other pertinent reports as below.  Records in CareEverywhere: No recent cultures in Care Everywhere    Nursing home/EMS Records: None    Current/Prior Cultures: COVID and flu testing negative.  Urinary antigens pending.  Blood cultures pending.

## 2025-01-24 NOTE — ASSESSMENT & PLAN NOTE
3-day history of nonradiating low back pain, fever and chills.  Recent treatment for chemotherapy 1/23/2025.  CT abdomen pelvis show bilateral perinephric stranding, with right urothelial thickening, concerning for urinary tract infection   Severe sepsis criteria with oral temperature 101-102 °F, initial tachycardia 101, leukocytosis 2.36, bandemia 9%, calcitonin 0.35, lactic acidosis 2.3   Etiology of sepsis unclear, bacteremia vs carboplatin induced fever vs UTI vs pneumonitis.    Upon recent admission, patient was diagnosed with pneumocystis jiroveci  One dose of cefepime given in ED,     Plan  Follow-up blood cultures  Continue with cefepime, added vancomycin for broader coverage for presumable bacteremia with unidentified source.  Consider PCP PCR if blood cultures come back negative  Consult ID

## 2025-01-24 NOTE — TELEPHONE ENCOUNTER
Called [facility] and spoke to Esha. Patient currently admitted at [facility] after chemo yesterday. Will cancel Avastin treatment for Monday, 1/27

## 2025-01-24 NOTE — CONSULTS
Medical Oncology/Hematology Consult Note  Ayla Nye, female, 74 y.o., 1950,  ED-41/ED-41, [id number]     Assessment and Plan    1. Metastatic lung adenocarcinoma with disease progression  2. Fever  3. Generalized weakness    Mrs. Nye is a 74 year old female with metastatic lung adenocarcinoma, currently on treatment with carboplatin and pemetrexed, with most recent with C3 on 1/23/25. With recent concern for pneumonitis, immunotherapy was discontinued after 2 cycles of chemoimmunotherapy. Patient is currently admitted for evaluation of fevers, progressive shortness of breath, and generalized weakness. Patient was febrile on admission, but cultures have all been negative thus far.  Imaging on admission has been notable for disease progression involving the lung nodules.  Although imaging had reported bilateral perinephric stranding, patient's UA and clinical symptoms are not suggestive of active UTI.    Fever possibly 2/2 tumor progression, but given pt's immunocompromised state, agree with antibiotics as per ID recs for now until cultures are negative for 48 hrs. If patient is clinically stable with improvement of her weakness, she is ok to be discharged from medical oncology standpoint with follow-up with primary oncology team for discussion of future treatment options. Note that patient had only gotten 2 cycles of systemic treatment thus far (on 10/7/24 and 11/14/24) and several cycles needed to be held in setting of hospitalizations. C3 was just yesterday. Reasonable to continue current treatment regimen with carboplatin and pemetrexed for another 2 cycles to assess response in another 2 months. Otherwise, patient with limited efficacious treatment options left. Patient has an ECOG performance status of 2 at best and she has had multiple hospitalizations for symptoms of progressive dyspnea, fever, and fatigue. Would need to address goals of care with patient's primary oncology team in future  office visits.      Summary of recommendations:   Continue management with antibiotics for now as per ID recs. Tumor progression is a potential cause of her fevers. Deescalation of abx as per ID res.   Tumor progression noted, but please note that patient's last treatment prior to 1/23/25 treatment was on 11/14/25 which was over 2 months ago. Reasonable to continue current treatment regimen for another 2 cycles and reassess treatment response via imaging in another 2 months after patient has received treatment consistently. Otherwise, limited efficacious treatment options left in the absence of any targetable mutations. Final treatment decisions to follow from primary oncology team, along with goals of care discussions.   Ok to discharge patient from medical oncology standpoint if she is clinically improved and stable from medical standpoint in next 24-48 hrs.     Outpatient follow up plan: Medical oncology f/u with Dr. Castro scheduled for 1/27/25 at 9:40A at [facility]     Communication with patient/family:  Patient was updated regarding above recommendations    Case was discussed with hematology/oncology attending, Dr. Kelley      Reason for consultation: Metastatic lung adenocarcinoma    History of present illness:      Ayla Nye is a 74 y.o. female with metastatic lung adenocarcinoma, currently on systemic therapy with carboplatin and pemetrexed. She was originally diagnosed as stage IIIB lung adenocarcinoma in March 2024, underwent definitive chemoRT between May and July 2024. She was found to have brain lesions concerning for supratentorial and infratentorial brain metastases in July 2024, for which she underwent SRS in August 2024. Imaging in October 2024 was notable for bilateral pulmonary nodules, new right renal lesion, and issue rectal lesion, concerning for metastasis.  Patient was initiated on systemic treatment with chemoimmunotherapy combination on 10/7/2024, consisting of carboplatin,  pemetrexed, and pembrolizumab. Patient has had multiple hospital admissions since October for evaluation of fatigue, shortness of breath, generalized weakness, febrile episodes.  During November 2024 admission, patient was diagnosed with PCP pneumonia.  Most recently, patient was admitted in the hospital from 12/30/24-1/22/25 with progressive shortness of breath.  There was concern for pneumonitis patient was initiated on steroids with improvement of her symptoms. Immunotherapy has been removed from her treatment regimen after first 2 cycles due to concern for pneumonitis.     Patient was most recently treated yesterday with carboplatin and pemetrexed. After patient went home, she noticed significant fatigue, weakness, SOB, and mild nausea. When she took her temperature in the evening, temperature was 103 deg F, prompting her to seek further evaluation in the ED. ED vitals were notable for febrile temperature of 101.2 °F.  Sepsis workup was initiated with all cultures negative thus far.  CT CAP was notable for disease progression with rapid interval enlargement of left lung pulmonary nodules (compared to 1/14/2025), increase in right upper lobe mass, and increase in right renal mass.  Additionally, it reported bilateral perinephric stranding with right urothelial thickening, concerning for UTI.  UA is negative for any leukocytes, nitrates, or WBCs.  Patient also without any clinical symptoms suggestive of UTI.    When I saw her at bedside this afternoon, she reported some improvement of her breathing, although she continues to feel very weak and fatigued.       Review of Systems:    Review of Systems   Constitutional:  Positive for fatigue and fever.   Respiratory:  Positive for shortness of breath (with exertion, chronic). Negative for cough and wheezing.    Cardiovascular:  Negative for chest pain, palpitations and leg swelling.   Genitourinary:  Negative for difficulty urinating and dysuria.   Neurological:   Positive for weakness (generalized). Negative for dizziness, light-headedness, numbness and headaches.   Hematological:  Negative for adenopathy. Does not bruise/bleed easily.   Psychiatric/Behavioral:  Negative for agitation and confusion.        Oncology History:   Cancer Staging   Metastatic adenocarcinoma (HCC)  Staging form: Lung, AJCC 8th Edition  - Clinical stage from 4/15/2024: Stage IIIB (cT2b, cN3, cM0) - Signed by Rogelio Beebe DO on 4/15/2024  - Clinical: Stage IV (cM1) - Signed by Honey Gamboa MD on 9/23/2024    Oncology History   Metastatic adenocarcinoma (HCC)   2024 Initial Diagnosis    Primary lung adenocarcinoma, right (HCC)     3/26/2024 Biopsy    A-C. Lymph Node, Level 4R :    - Metastatic non-small cell carcinoma, most compatible with a primary lung adenocarcinoma; see note.       D-F. Lymph Node, Level 10R:    - Metastatic non-small cell carcinoma; see note.       G-I. Lymph Node, Level 4L:    - Metastatic non-small cell carcinoma; see note.       4/15/2024 -  Cancer Staged    Staging form: Lung, AJCC 8th Edition  - Clinical stage from 4/15/2024: Stage IIIB (cT2b, cN3, cM0) - Signed by Rogelio Beebe DO on 4/15/2024       5/23/2024 - 7/2/2024 Chemotherapy    alteplase (CATHFLO), 2 mg, Intracatheter, Every 1 Minute as needed, 6 of 6 cycles  CARBOplatin (PARAPLATIN) IVPB (GOG AUC DOSING), 130.4 mg, Intravenous, Once, 6 of 6 cycles  Administration: 130.4 mg (5/23/2024), 144.8 mg (5/30/2024), 138.4 mg (6/6/2024), 144.8 mg (6/13/2024), 132 mg (6/21/2024), 143.6 mg (7/2/2024)  PACLItaxel (TAXOL) chemo IVPB, 45 mg/m2 = 67.2 mg (90 % of original dose 50 mg/m2), Intravenous, Once, 6 of 6 cycles  Dose modification: 45 mg/m2 (original dose 50 mg/m2, Cycle 1, Reason: Anticipated Tolerance)  Administration: 67.2 mg (5/23/2024), 67.2 mg (5/30/2024), 67.2 mg (6/6/2024), 67.2 mg (6/13/2024), 67.2 mg (6/21/2024), 67.2 mg (7/2/2024)     5/23/2024 - 7/5/2024 Radiation    Treatment:  Course:  C1    Plan ID Energy Fractions Dose per Fraction (cGy) Dose Correction (cGy) Total Dose Delivered (cGy) Elapsed Days   R Lung_Hilum 6X 30 / 30 200 0 6,000 43      Treatment dates:  C1: 5/23/2024 - 7/5/2024 8/8/2024 - 8/8/2024 Radiation    SRS 5 PTVs   2000 cGy     9/12/2024 Biopsy    Mass, Superficial perianal mass:  - Squamous cell carcinoma.     Note: The patient's prior lung EBUS sampling shows the tumor to stain for TTF1 and Napsin with absent p40 expression.  The current perianal mass sampling shows diffuse p40 expression with absent TTF1 expression.  This may represent a primary anal/perianal squamous cell carcinoma versus a possible metastatic combined lung tumor (adenocarcinoma and previously unsampled squamous component).  Suggest clinical correlation and appropriate follow-up.         9/23/2024 -  Cancer Staged    Staging form: Lung, AJCC 8th Edition  - Clinical: Stage IV (cM1) - Signed by Honey Gamboa MD on 9/23/2024       10/1/2024 - 10/18/2024 Radiation    Course: C3    Plan ID Energy Fractions Dose per Fraction (cGy) Dose Correction (cGy) Total Dose Delivered (cGy) Elapsed Days   R Gluteus:1 10X/6X 14 / 15 300 0 4,200 17      Treatment Dates:  10/1/2024 - 10/18/2024.       10/7/2024 -  Chemotherapy    cyanocobalamin, 1,000 mcg, Intramuscular, Once, 2 of 3 cycles  Administration: 1,000 mcg (8/19/2024), 1,000 mcg (1/23/2025)  alteplase (CATHFLO), 2 mg, Intracatheter, Every 1 Minute as needed, 3 of 6 cycles  palonosetron (ALOXI), 0.25 mg, Intravenous, Once, 1 of 4 cycles  Administration: 0.25 mg (1/23/2025)  fosaprepitant (EMEND) IVPB, 150 mg, Intravenous, Once, 3 of 6 cycles  Administration: 150 mg (10/7/2024), 150 mg (1/23/2025), 150 mg (11/14/2024)  CARBOplatin (PARAPLATIN) IVPB (GOG AUC DOSING), 347 mg, Intravenous, Once, 3 of 6 cycles  Administration: 347 mg (10/7/2024), 256.8 mg (1/23/2025), 346 mg (11/14/2024)  bevacizumab (AVASTIN) IVPB, 5 mg/kg = 280 mg, Intravenous, Once, 1 of 4  cycles  Dose modification: 5 mg/kg (original dose 5 mg/kg, Cycle 4, Reason: Anticipated Tolerance)  pemetrexed (ALIMTA) chemo infusion, 735 mg, Intravenous, Once, 3 of 6 cycles  Administration: 700 mg (10/7/2024), 700 mg (1/23/2025), 700 mg (11/14/2024)  pembrolizumab (KEYTRUDA) IVPB, 200 mg, Intravenous, Once, 2 of 2 cycles  Administration: 200 mg (11/14/2024), 200 mg (10/7/2024)     Metastatic cancer to brain (HCC)   7/29/2024 Initial Diagnosis    Metastatic cancer to brain (HCC)     8/8/2024 - 8/8/2024 Radiation    SRS 5 PTVs   2000 cGy     9/12/2024 Biopsy    Mass, Superficial perianal mass:  - Squamous cell carcinoma.     Note: The patient's prior lung EBUS sampling shows the tumor to stain for TTF1 and Napsin with absent p40 expression.  The current perianal mass sampling shows diffuse p40 expression with absent TTF1 expression.  This may represent a primary anal/perianal squamous cell carcinoma versus a possible metastatic combined lung tumor (adenocarcinoma and previously unsampled squamous component).  Suggest clinical correlation and appropriate follow-up.         10/1/2024 - 10/18/2024 Radiation    Course: C3    Plan ID Energy Fractions Dose per Fraction (cGy) Dose Correction (cGy) Total Dose Delivered (cGy) Elapsed Days   R Gluteus:1 10X/6X 14 / 15 300 0 4,200 17      Treatment Dates:  10/1/2024 - 10/18/2024.           Past Medical History:   Past Medical History:   Diagnosis Date    Allergic 2022    Allergic to neomiacin and cephalexin    Cancer (HCC)     lung cancer    Cancer (HCC)     mets to brain    Cancer (HCC)     perianal mass    Cataract Nov. 2023    Due to have durgery June 2024    Essential thrombocytosis (HCC)     controlled with medication    History of transfusion     Hyperlipidemia     Hypertension     Mass in chest     Nodular goiter     Osteoporosis     Peripheral neuropathy     Pneumonia Oct 16, 2023    Also  Feb 2024    Psoriasis     SIRS (systemic inflammatory response syndrome) (HCC)  2024       Past Surgical History:   Procedure Laterality Date    APPENDECTOMY      BREAST CYST EXCISION Right     COLONOSCOPY  10/31/2018    DXA PROCEDURE (HISTORICAL)  2017    IR BIOPSY OTHER  2024    IR LUMBAR PUNCTURE  2024    MAMMO (HISTORICAL)      8-24-18    MAMMO NEEDLE LOCALIZATION RIGHT (ALL INC) Right 2009    SKIN LESION EXCISION      bridge of nose       Family History   Problem Relation Age of Onset    Stroke Mother     Depression Mother             Hypertension Mother     Hearing loss Mother     Tuberculosis Father     Cancer Brother         lung    Tuberculosis Brother     Coronary artery disease Brother     Hypertension Brother     No Known Problems Daughter     No Known Problems Daughter     No Known Problems Maternal Grandmother     No Known Problems Maternal Grandfather     No Known Problems Paternal Grandmother     No Known Problems Paternal Grandfather     No Known Problems Maternal Aunt     No Known Problems Maternal Aunt     No Known Problems Maternal Aunt     No Known Problems Maternal Aunt     No Known Problems Paternal Aunt     Cancer Brother         Throat canver       Social History     Socioeconomic History    Marital status:      Spouse name: Not on file    Number of children: Not on file    Years of education: Not on file    Highest education level: Not on file   Occupational History    Not on file   Tobacco Use    Smoking status: Former     Current packs/day: 1.00     Average packs/day: 1 pack/day for 59.1 years (59.1 ttl pk-yrs)     Types: Cigarettes     Start date:      Passive exposure: Past    Smokeless tobacco: Never    Tobacco comments:     Quit 2010   Vaping Use    Vaping status: Never Used   Substance and Sexual Activity    Alcohol use: Yes     Alcohol/week: 5.0 standard drinks of alcohol     Types: 5 Glasses of wine per week    Drug use: Never    Sexual activity: Not Currently     Partners: Male     Birth control/protection:  Post-menopausal   Other Topics Concern    Not on file   Social History Narrative    Not on file     Social Drivers of Health     Financial Resource Strain: Low Risk  (2023)    Overall Financial Resource Strain (CARDIA)     Difficulty of Paying Living Expenses: Not hard at all   Food Insecurity: No Food Insecurity (2024)    Nursing - Inadequate Food Risk Classification     Worried About Running Out of Food in the Last Year: Never true     Ran Out of Food in the Last Year: Never true     Ran Out of Food in the Last Year: Never true   Transportation Needs: No Transportation Needs (2024)    Nursing - Transportation Risk Classification     Lack of Transportation: Not on file     Lack of Transportation: No   Physical Activity: Not on file   Stress: Not on file   Social Connections: Not on file   Intimate Partner Violence: Unknown (2024)    Nursing IPS     Feels Physically and Emotionally Safe: Not on file     Physically Hurt by Someone: Not on file     Humiliated or Emotionally Abused by Someone: Not on file     Physically Hurt by Someone: No     Hurt or Threatened by Someone: No   Housing Stability: Unknown (2024)    Nursing: Inadequate Housing Risk Classification     Has Housing: Not on file     Worried About Losing Housing: Not on file     Unable to Get Utilities: Not on file     Unable to Pay for Housing in the Last Year: No     Has Housin         Current Facility-Administered Medications:     acetaminophen (TYLENOL) tablet 975 mg, 975 mg, Oral, Q6H PRN, Curtis Elizabeth MD, 975 mg at 25 1405    amLODIPine (NORVASC) tablet 5 mg, 5 mg, Oral, Daily, Curtis Elizabeth MD, 5 mg at 25 0835    ceftriaxone (ROCEPHIN) 2 g/50 mL in dextrose IVPB, 2,000 mg, Intravenous, Q24H, Magnolia Stanton MD, Stopped at 25 1123    cyanocobalamin (VITAMIN B-12) tablet 1,000 mcg, 1,000 mcg, Oral, Daily, Curtis Elizabeth MD, 1,000 mcg at 25 0850    dexamethasone (DECADRON) tablet 2 mg, 2 mg,  Oral, BID **FOLLOWED BY** [START ON 1/29/2025] dexamethasone (DECADRON) tablet 2 mg, 2 mg, Oral, Daily, Curtis Elizabeth MD    folic acid (FOLVITE) tablet 1 mg, 1 mg, Oral, Daily, Curtis Elizabeth MD, 1 mg at 01/24/25 0836    gabapentin (NEURONTIN) capsule 100 mg, 100 mg, Oral, TID, Curtis Elizabeth MD, 100 mg at 01/24/25 0837    gabapentin (NEURONTIN) capsule 100 mg, 100 mg, Oral, Once, Curtis Elizabeth MD    gabapentin (NEURONTIN) capsule 200 mg, 200 mg, Oral, HS, Curtis Elizabeth MD    levETIRAcetam (KEPPRA) tablet 1,000 mg, 1,000 mg, Oral, Q12H, Curtis Elizabeth MD    levothyroxine tablet 50 mcg, 50 mcg, Oral, Early Morning, Curtis Elizabeth MD, 50 mcg at 01/24/25 0611    multi-electrolyte (PLASMALYTE-A/ISOLYTE-S PH 7.4) IV solution, 150 mL/hr, Intravenous, Continuous, Curtis Elizabeth MD, Last Rate: 150 mL/hr at 01/24/25 0306, 150 mL/hr at 01/24/25 0306    pantoprazole (PROTONIX) EC tablet 40 mg, 40 mg, Oral, Early Morning, Curtis Elizabeth MD, 40 mg at 01/24/25 0605    [START ON 1/25/2025] rivaroxaban (XARELTO) tablet 20 mg, 20 mg, Oral, Daily With Breakfast, Curtis Elizabeth MD    senna (SENOKOT) tablet 17.2 mg, 2 tablet, Oral, Daily, Curtis Elizabeth MD, 17.2 mg at 01/24/25 0836    [START ON 1/27/2025] sulfamethoxazole-trimethoprim (BACTRIM DS) 800-160 mg per tablet 1 tablet, 1 tablet, Oral, Once per day on Monday Wednesday Friday, Magnolia Stanton MD    vancomycin (VANCOCIN) 1000 mg in sodium chloride 0.9% 250 mL IVPB, 1,000 mg, Intravenous, Q24H, Yulissa Calderón MD    Current Outpatient Medications:     amLODIPine (NORVASC) 5 mg tablet, TAKE 1 TABLET(5 MG) BY MOUTH DAILY, Disp: 30 tablet, Rfl: 5    dexamethasone (DECADRON) 4 mg tablet, Take 1 tablet (4 mg total) by mouth every 8 (eight) hours for 5 days, THEN 1 tablet (4 mg total) every 12 (twelve) hours for 7 days, THEN 1.5 tablets (6 mg total) in the morning for 7 days, THEN 0.5 tablets (2 mg total) 2 (two) times a day for 7 days, THEN 0.5 tablets (2 mg total) in the  morning for 7 days. When you get to the 6 mg dosage please take 4 mg in the morning followed by 2 mg in the evening; that would be 1 tablet in the morning followed by 1/2 tablet in the evening for that week.., Disp: 50 tablet, Rfl: 0    folic acid (FOLVITE) 1 mg tablet, Take 1 tablet (1 mg total) by mouth daily, Disp: 30 tablet, Rfl: 6    gabapentin (NEURONTIN) 100 mg capsule, TAKE 1 CAPSULE BY MOUTH EVERY MORNING, 1 CAPSULE EVERY AFTERNOON AND 3 CAPSULES EVERY NIGHT AT BEDTIME, Disp: 150 capsule, Rfl: 5    levETIRAcetam (KEPPRA) 1000 MG tablet, TAKE 1 TABLET(1000 MG) BY MOUTH EVERY 12 HOURS, Disp: 60 tablet, Rfl: 1    levothyroxine 50 mcg tablet, TAKE 1 TABLET(50 MCG) BY MOUTH DAILY EARLY MORNING, Disp: 30 tablet, Rfl: 1    rivaroxaban (Xarelto) 20 mg tablet, Take 1 tablet (20 mg total) by mouth daily with breakfast, Disp: 30 tablet, Rfl: 0    senna (SENOKOT) 8.6 MG tablet, Take 1 tablet by mouth daily Takes one every other day (Patient taking differently: Take 2 tablets by mouth daily Takes two every other day), Disp: , Rfl:     sulfamethoxazole-trimethoprim (BACTRIM DS) 800-160 mg per tablet, Take 1 tablet by mouth daily Starting 12/24, you can take one tablet Monday, Wednesday, and Fridays. Do not start before December 24, 2024., Disp: 36 tablet, Rfl: 4    vitamin B-12 (VITAMIN B-12) 1,000 mcg tablet, Take 1 tablet (1,000 mcg total) by mouth daily, Disp: 90 tablet, Rfl: 1    pantoprazole (PROTONIX) 40 mg tablet, Take 1 tablet (40 mg total) by mouth daily in the early morning, Disp: 30 tablet, Rfl: 5    Probiotic Product (PROBIOTIC DAILY PO), Take 1 capsule by mouth daily ON HOLD (Patient not taking: Reported on 1/23/2025), Disp: , Rfl:     Facility-Administered Medications Ordered in Other Encounters:     [MAR Hold] alteplase (CATHFLO) injection 2 mg, 2 mg, Intracatheter, Q1MIN PRN, Rosalinda Castro MD    Not in a hospital admission.    Allergies   Allergen Reactions    Doxycycline Headache    Keflex [Cephalexin]  Rash    Neomycin-Polymyxin-Dexameth Eye Swelling         Physical Exam:     /68 (BP Location: Right arm)   Pulse 85   Temp 98.5 °F (36.9 °C) (Oral)   Resp 18   SpO2 94%     Physical Exam  Vitals reviewed.   Constitutional:       General: She is not in acute distress.     Appearance: She is not ill-appearing.   HENT:      Head: Normocephalic and atraumatic.      Mouth/Throat:      Mouth: Mucous membranes are moist.      Pharynx: No oropharyngeal exudate or posterior oropharyngeal erythema.   Eyes:      General: No scleral icterus.     Extraocular Movements: Extraocular movements intact.      Conjunctiva/sclera: Conjunctivae normal.   Cardiovascular:      Rate and Rhythm: Normal rate and regular rhythm.   Pulmonary:      Effort: No respiratory distress.      Breath sounds: Normal breath sounds. No stridor.   Abdominal:      General: Bowel sounds are normal. There is no distension.      Palpations: Abdomen is soft. There is no mass.      Tenderness: There is no abdominal tenderness.   Musculoskeletal:      Cervical back: Normal range of motion. No rigidity.      Right lower leg: No edema.      Left lower leg: No edema.   Lymphadenopathy:      Cervical: No cervical adenopathy.   Neurological:      Mental Status: She is alert and oriented to person, place, and time.      Motor: Weakness (RUE/RLE weakness noted) present.       Recent Results (from the past 48 hours)   UA (URINE) with reflex to Scope    Collection Time: 01/23/25  1:17 PM   Result Value Ref Range    Color, UA Yellow     Clarity, UA Clear     Specific Gravity, UA 1.020 1.005 - 1.030    pH, UA 5.5 4.5, 5.0, 5.5, 6.0, 6.5, 7.0, 7.5, 8.0    Leukocytes, UA Negative Negative    Nitrite, UA Negative Negative    Protein, UA Trace (A) Negative mg/dl    Glucose, UA Negative Negative mg/dl    Ketones, UA Negative Negative mg/dl    Urobilinogen, UA <2.0 <2.0 mg/dl mg/dl    Bilirubin, UA Negative Negative    Occult Blood, UA Moderate (A) Negative   Urine  Microscopic    Collection Time: 01/23/25  1:17 PM   Result Value Ref Range    RBC, UA 4-10 (A) None Seen, 0-1, 1-2, 2-4, 0-5 /hpf    WBC, UA 1-2 None Seen, 0-1, 1-2, 0-5, 2-4 /hpf    Epithelial Cells Occasional None Seen, Occasional /hpf    Bacteria, UA Occasional None Seen, Occasional /hpf    Hyaline Casts, UA 1-2 (A) (none) /lpf   CBC and differential    Collection Time: 01/23/25  9:45 PM   Result Value Ref Range    WBC 2.36 (L) 4.31 - 10.16 Thousand/uL    RBC 3.36 (L) 3.81 - 5.12 Million/uL    Hemoglobin 10.1 (L) 11.5 - 15.4 g/dL    Hematocrit 33.5 (L) 34.8 - 46.1 %     (H) 82 - 98 fL    MCH 30.1 26.8 - 34.3 pg    MCHC 30.1 (L) 31.4 - 37.4 g/dL    RDW 16.1 (H) 11.6 - 15.1 %    MPV 9.2 8.9 - 12.7 fL    Platelets 185 149 - 390 Thousands/uL   Protime-INR    Collection Time: 01/23/25  9:45 PM   Result Value Ref Range    Protime 16.4 (H) 12.3 - 15.0 seconds    INR 1.24 (H) 0.85 - 1.19   APTT    Collection Time: 01/23/25  9:45 PM   Result Value Ref Range    PTT 24 23 - 34 seconds   Comprehensive metabolic panel    Collection Time: 01/23/25  9:45 PM   Result Value Ref Range    Sodium 141 135 - 147 mmol/L    Potassium 3.7 3.5 - 5.3 mmol/L    Chloride 107 96 - 108 mmol/L    CO2 23 21 - 32 mmol/L    ANION GAP 11 4 - 13 mmol/L    BUN 21 5 - 25 mg/dL    Creatinine 0.98 0.60 - 1.30 mg/dL    Glucose 124 65 - 140 mg/dL    Calcium 8.8 8.4 - 10.2 mg/dL    AST 19 13 - 39 U/L    ALT 15 7 - 52 U/L    Alkaline Phosphatase 52 34 - 104 U/L    Total Protein 6.3 (L) 6.4 - 8.4 g/dL    Albumin 3.7 3.5 - 5.0 g/dL    Total Bilirubin 0.28 0.20 - 1.00 mg/dL    eGFR 57 ml/min/1.73sq m   Lactic acid, plasma (w/reflex if result > 2.0)    Collection Time: 01/23/25  9:45 PM   Result Value Ref Range    LACTIC ACID 2.3 (H) 0.5 - 2.0 mmol/L   Procalcitonin    Collection Time: 01/23/25  9:45 PM   Result Value Ref Range    Procalcitonin 8.35 (H) <=0.25 ng/ml   Blood culture #1    Collection Time: 01/23/25  9:45 PM    Specimen: Line, Venous; Blood    Result Value Ref Range    Blood Culture Received in Microbiology Lab. Culture in Progress.    Blood culture #2    Collection Time: 01/23/25  9:45 PM    Specimen: Arm, Right; Blood   Result Value Ref Range    Blood Culture Received in Microbiology Lab. Culture in Progress.    Manual Differential(PHLEBS Do Not Order)    Collection Time: 01/23/25  9:45 PM   Result Value Ref Range    Segmented % 88 (H) 43 - 75 %    Bands % 9 (H) 0 - 8 %    Lymphocytes % 0 (L) 14 - 44 %    Monocytes % 1 (L) 4 - 12 %    Eosinophils % 0 0 - 6 %    Basophils % 0 0 - 1 %    Metamyelocytes % 1 0 - 1 %    Myelocytes % 1 0 - 1 %    Absolute Neutrophils 2.29 1.85 - 7.62 Thousand/uL    Absolute Lymphocytes 0.00 (L) 0.60 - 4.47 Thousand/uL    Absolute Monocytes 0.02 0.00 - 1.22 Thousand/uL    Absolute Eosinophils 0.00 0.00 - 0.40 Thousand/uL    Absolute Basophils 0.00 0.00 - 0.10 Thousand/uL    Absolute Metamyelocytes 0.02 0.00 - 0.10 Thousand/uL    Absolute Myelocytes 0.02 0.00 - 0.10 Thousand/uL    Total Counted      Toxic Vacuolization Present     RBC Morphology Present     Platelet Estimate Adequate Adequate    Anisocytosis Present     Polychromasia Present    FLU/RSV/COVID - if FLU/RSV clinically relevant (2hr TAT)    Collection Time: 01/23/25 10:26 PM    Specimen: Nose; Nares   Result Value Ref Range    SARS-CoV-2 Negative Negative    INFLUENZA A PCR Negative Negative    INFLUENZA B PCR Negative Negative    RSV PCR Negative Negative   Strep A PCR    Collection Time: 01/23/25 10:26 PM    Specimen: Throat   Result Value Ref Range    STREP A PCR Not Detected Not Detected   UA w Reflex to Microscopic w Reflex to Culture    Collection Time: 01/23/25 11:35 PM    Specimen: Urine, Clean Catch   Result Value Ref Range    Color, UA Light Yellow     Clarity, UA Clear     Specific Gravity, UA 1.034 (H) 1.003 - 1.030    pH, UA 5.0 4.5, 5.0, 5.5, 6.0, 6.5, 7.0, 7.5, 8.0    Leukocytes, UA Negative Negative    Nitrite, UA Negative Negative    Protein, UA  Trace (A) Negative mg/dl    Glucose, UA Negative Negative mg/dl    Ketones, UA Negative Negative mg/dl    Urobilinogen, UA 4.0 (A) <2.0 mg/dl mg/dl    Bilirubin, UA Small (A) Negative    Occult Blood, UA Small (A) Negative   Urine Microscopic    Collection Time: 01/23/25 11:35 PM   Result Value Ref Range    RBC, UA 1-2 None Seen, 1-2 /hpf    WBC, UA 1-2 None Seen, 1-2 /hpf    Epithelial Cells Occasional None Seen, Occasional /hpf    Bacteria, UA None Seen None Seen, Occasional /hpf   Lactic acid 2 Hours    Collection Time: 01/24/25 12:09 AM   Result Value Ref Range    LACTIC ACID 1.9 0.5 - 2.0 mmol/L   Lactic acid, plasma (w/reflex if result > 2.0)    Collection Time: 01/24/25  3:13 AM   Result Value Ref Range    LACTIC ACID 2.0 0.5 - 2.0 mmol/L   Platelet count    Collection Time: 01/24/25  3:13 AM   Result Value Ref Range    Platelets 206 149 - 390 Thousands/uL    MPV 9.2 8.9 - 12.7 fL   Basic metabolic panel    Collection Time: 01/24/25  5:44 AM   Result Value Ref Range    Sodium 138 135 - 147 mmol/L    Potassium 4.4 3.5 - 5.3 mmol/L    Chloride 109 (H) 96 - 108 mmol/L    CO2 22 21 - 32 mmol/L    ANION GAP 7 4 - 13 mmol/L    BUN 20 5 - 25 mg/dL    Creatinine 0.86 0.60 - 1.30 mg/dL    Glucose 116 65 - 140 mg/dL    Calcium 7.7 (L) 8.4 - 10.2 mg/dL    eGFR 66 ml/min/1.73sq m   CBC and Platelet    Collection Time: 01/24/25  5:44 AM   Result Value Ref Range    WBC 7.02 4.31 - 10.16 Thousand/uL    RBC 2.93 (L) 3.81 - 5.12 Million/uL    Hemoglobin 9.0 (L) 11.5 - 15.4 g/dL    Hematocrit 28.7 (L) 34.8 - 46.1 %    MCV 98 82 - 98 fL    MCH 30.7 26.8 - 34.3 pg    MCHC 31.4 31.4 - 37.4 g/dL    RDW 16.3 (H) 11.6 - 15.1 %    Platelets 181 149 - 390 Thousands/uL    MPV 9.0 8.9 - 12.7 fL   Procalcitonin, Next Day AM Collection    Collection Time: 01/24/25  5:44 AM   Result Value Ref Range    Procalcitonin 29.04 (H) <=0.25 ng/ml   UA w Reflex to Microscopic w Reflex to Culture    Collection Time: 01/24/25  9:43 AM    Specimen:  Urine, Clean Catch   Result Value Ref Range    Color, UA Colorless     Clarity, UA Clear     Specific Gravity, UA 1.018 1.003 - 1.030    pH, UA 5.5 4.5, 5.0, 5.5, 6.0, 6.5, 7.0, 7.5, 8.0    Leukocytes, UA Negative Negative    Nitrite, UA Negative Negative    Protein, UA Trace (A) Negative mg/dl    Glucose, UA Negative Negative mg/dl    Ketones, UA Negative Negative mg/dl    Urobilinogen, UA <2.0 <2.0 mg/dl mg/dl    Bilirubin, UA Negative Negative    Occult Blood, UA Small (A) Negative   Urine Microscopic    Collection Time: 01/24/25  9:43 AM   Result Value Ref Range    RBC, UA 1-2 None Seen, 1-2 /hpf    WBC, UA None Seen None Seen, 1-2 /hpf    Epithelial Cells Occasional None Seen, Occasional /hpf    Bacteria, UA None Seen None Seen, Occasional /hpf       CT chest abdomen pelvis w contrast  Result Date: 1/24/2025  Narrative: CT CHEST, ABDOMEN AND PELVIS WITH IV CONTRAST INDICATION: fever and bilateral lumbar/flank pain; sepsis. COMPARISON: CT 1/14/2025, 12/30/2024, 11/24/2024, 10/22/2024 TECHNIQUE: CT examination of the chest, abdomen and pelvis was performed. Multiplanar 2D reformatted images were created from the source data. This examination, like all CT scans performed in the [facility], was performed utilizing techniques to minimize radiation dose exposure, including the use of iterative reconstruction and automated exposure control. Radiation dose length product (DLP) for this visit: 315 mGy-cm IV Contrast: 90 mL of iohexol (OMNIPAQUE) Enteric Contrast: Not administered. FINDINGS: CHEST LUNGS: Right upper lobe mass measures 3.2 x 2.4 cm, shows progressive enlargement. There is currently more abutment with the adjacent mediastinum then when compared to the most recent prior. Parenchymal scarring in the right upper lobe and right lower lobe superior segments, likely radiation changes. 16 mm lingular nodule series 301/82, 8 mm on most recent prior 15 mm left lower lobe nodule series 301/82, 13 mm  on most recent prior PLEURA: Unremarkable. HEART/GREAT VESSELS: Heart is unremarkable for patient's age. No thoracic aortic aneurysm. MEDIASTINUM AND SUDEEP: Unremarkable. CHEST WALL AND LOWER NECK: Unremarkable. ABDOMEN LIVER/BILIARY TREE: Unremarkable. GALLBLADDER: No calcified gallstones. No pericholecystic inflammatory change. SPLEEN: Unremarkable. PANCREAS: Unremarkable. ADRENAL GLANDS: Unremarkable. KIDNEYS/URETERS: Right upper pole ill-defined hypoenhancing mass measures 5.5 x 4.2 cm on series 301/108, this measured as large as 4.5 cm on the most recent prior CT abdomen based off of my measurement Interval increase of bilateral perinephric stranding, even when compared to the most recent chest CT. There is mild right urothelial thickening in the renal pelvis STOMACH AND BOWEL: Colonic diverticulosis without findings of acute diverticulitis. APPENDIX: No findings to suggest appendicitis. ABDOMINOPELVIC CAVITY: 3.6 x 2.5 cm right ischiorectal fossa mass, series 301/227, decreased in size VESSELS: Unremarkable for patient's age. PELVIS REPRODUCTIVE ORGANS: Unremarkable for patient's age. URINARY BLADDER: Unremarkable. ABDOMINAL WALL/INGUINAL REGIONS: Unremarkable. BONES: Mild T5 superior endplate compression, in retrospect, there may have been a mild superior endplate compression on the most recent chest CT, which is now more pronounced, with surrounding sclerosis suggesting healing.     Impression: 1.  Findings consistent with disease progression, with rapid interval enlargement of left lung pulmonary nodules when compared to the CT 1/14/2025, increase in right upper lobe mass, and increase in right renal mass. There has been interval decrease in size of right ischiorectal fossa mass when compared to 11/24/2024. 2.  Bilateral perinephric stranding, with right urothelial thickening, concerning for urinary tract infection 3.  Mild T5 compression deformity, appears subacute The study was marked in EPIC for immediate  notification. Workstation performed: [id number]     CT chest w contrast  Result Date: 1/14/2025  Narrative: CT CHEST WITH IV CONTRAST INDICATION: C34.11: Malignant neoplasm of upper lobe, right bronchus or lung. New onset back pain COMPARISON: Abdominal CT dated 12/31/2024 TECHNIQUE: CT examination of the chest was performed. Multiplanar 2D reformatted images were created from the source data. This examination, like all CT scans performed in the [facility], was performed utilizing techniques to minimize radiation dose exposure, including the use of iterative reconstruction and automated exposure control. Radiation dose length product (DLP) for this visit: 157.4 mGy-cm IV Contrast: 85 mL of iohexol (OMNIPAQUE) FINDINGS: LUNGS: There are enlarging nodules in the left lower lobe and lingula including 8 mm lingula nodule on image 176 of series 3 which measured 5 mm previously. A bilobed nodule versus 2 adjacent nodules in the left lower lobe measure 8 x 13 mm on image 177 compared with 4 x 8 mm previously. Right upper lobe mass measures 2.5 cm AP by 2.5 cm transverse on image 61 of series 3, unchanged. Adjacent bandlike parenchymal opacity with involvement of the right upper and lower lobes appears similar and may reflect  posttreatment change. There is a background of mild emphysema. PLEURA: Unremarkable. HEART/GREAT VESSELS: Heart is unremarkable for patient's age. No thoracic aortic aneurysm. MEDIASTINUM AND SUDEEP: Low-attenuation soft tissue along the mediastinum on the right which is contiguous with the esophagus may reflect volume averaging with the adjacent azygos vein. CHEST WALL AND LOWER NECK: Unremarkable. VISUALIZED STRUCTURES IN THE UPPER ABDOMEN: Ill-defined mass along the upper pole of the right kidney measures 3.8 cm transverse by 2.9 cm AP on image 104 of series 2, similar to prior abdominal CT. Hepatic steatosis is noted. OSSEOUS STRUCTURES: No acute fracture or destructive osseous  lesion.     Impression: 1. No evidence of osseous metastatic disease of the thoracic spine. 2. Worsening nodules in the left lower lobe and lingula compatible with metastatic disease. 3. Similar appearance of the posttreatment change in the right lung. 4. Low-attenuation soft tissue density along the right mediastinum appears slightly more pronounced but at least partially reflects the azygos vein. Recommend attention to this area on follow-up. 5. Right upper renal mass is similar to recent abdominal CT. Primary or metastatic renal neoplasm suspected. Workstation performed: [id number]     CT abdomen w contrast  Result Date: 1/1/2025  Narrative: CT ABDOMEN WITH IV CONTRAST INDICATION: known malignancy. COMPARISON: 11/24/2024 TECHNIQUE: CT examination of the abdomen was performed after the administration of intravenous contrast. Multiplanar 2D reformatted images were created from the source data. This examination, like all CT scans performed in the [facility], was performed utilizing techniques to minimize radiation dose exposure, including the use of iterative reconstruction and automated exposure control. Radiation dose length product (DLP) for this visit: 214 mGy-cm IV Contrast: 85 mL of iohexol (OMNIPAQUE) Enteric Contrast: Not administered. FINDINGS: LOWER CHEST: Calcified left lower lobe pleural plaque. Cardiomegaly. LIVER/BILIARY TREE: Unremarkable. GALLBLADDER: No calcified gallstones. No pericholecystic inflammatory change. SPLEEN: Unremarkable. PANCREAS: Unremarkable. ADRENAL GLANDS: Unremarkable. KIDNEYS/VISUALIZED URETERS: Enlarging complex mass upper pole right kidney measuring 4.8 x 3.0 cm (previously 3.2 x 1.9 cm at a comparable level on exam from 11/24/2024). No hydronephrosis. Nonobstructing right nephrolithiasis. Scattered subcentimeter hypodensities arising from both kidneys, too small to characterize. STOMACH AND VISUALIZED BOWEL: Unremarkable. ABDOMINAL CAVITY: No ascites. No  pneumoperitoneum. No lymphadenopathy. VESSELS: Atherosclerosis without abdominal aortic aneurysm. ABDOMINAL WALL: Unremarkable. BONES: No acute fracture or suspicious osseous lesion. Spinal degenerative changes.     Impression: 1.  Enlarging complex mass upper pole right kidney concerning for enlarging metastasis or primary renal neoplasia. 2.  No acute findings. See comments above for full analysis. Workstation performed: [id number]     XR chest pa and lateral  Result Date: 12/31/2024  Narrative: XR CHEST PA AND LATERAL INDICATION: SOB. COMPARISON: Chest radiograph 12/1/2024 and concurrent CTA chest FINDINGS: Known right upper lobe mass is evident, though its full extent is better evident on prior CT. Increased right upper lobe atelectasis. Persistent opacities throughout the right upper lung. No pneumothorax or pleural effusion. Normal cardiomediastinal silhouette. Age-related degenerative changes in the spine. Normal upper abdomen..    Impression: Right upper lobe mass which is better characterized on concurrent CTA chest. Right upper lung atelectasis has increased since the 12/1/2024 radiograph. Opacities in the right upper lung could represent posttreatment change an/or tumor. Infection is to be  determined clinical grounds. Resident: ASYA CHANCE I, the attending radiologist, have reviewed the images and agree with the final report above. 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 COMPARISON: CTA PE study 11/24/2024. TECHNIQUE: CTA examination of the chest was performed using angiographic technique according to a protocol specifically tailored to evaluate for pulmonary embolism. Multiplanar 2D reformatted images were created from the source data. In  addition, coronal  3D MIP postprocessing was performed on the acquisition scanner. Radiation dose length product (DLP) for this visit: 191 mGy-cm . This examination, like all CT scans performed in the [facility], was performed utilizing techniques to minimize radiation dose exposure, including the use of iterative reconstruction and automated exposure control. IV Contrast: 85 mL of iohexol (OMNIPAQUE) FINDINGS: PULMONARY ARTERIAL TREE: Interval resolution of right middle lobe/right lower lobe pulmonary embolism. Interval near complete resolution of previously seen left lower lobe pulmonary embolism with questionable minimal residual linear thrombus in this region (301/109). LUNGS: Spiculated right upper lobe nodule measures 2.5 x 2.4 cm (3/62), slightly larger than on prior exam. Similar appearance of right upper lobe groundglass opacities with interval increase in opacities in the superior right lower lobe. Cluster of micronodules in the medial right upper lobe (304/) may be infectious or inflammatory. Additional groundglass and reticular opacities in the left upper lobe. New lingular 6 mm nodule (304/173) and left lower lobe 7 mm nodule (304/170). Mild emphysema. PLEURA: Left calcified basilar pleural plaque (301/158). HEART/GREAT VESSELS: Heart is normal in size. Coronary artery calcifications. No thoracic aortic aneurysm. MEDIASTINUM AND SUDEEP: Unremarkable. CHEST WALL AND LOWER NECK: Unremarkable. VISUALIZED STRUCTURES IN THE UPPER ABDOMEN: Few calculi within the partially imaged right kidney measuring up to 3 mm without hydronephrosis in the visualized portion of the right kidney. Exophytic lesion arising from the upper pole of the right kidney measuring approximately 2.6 cm (602/112) in the region in which metastasis was suspected on prior CT. OSSEOUS STRUCTURES: Degenerative changes of the spine.     Impression: No new pulmonary embolism. Resolution of previously seen right-sided  pulmonary embolism. Near complete resolution of previously seen left lower lobe pulmonary embolism with questionable minimal residual linear thrombus in this region. Spiculated right upper lobe nodule measures slightly larger than on prior exam. Increase in right lung groundglass opacities which may represent worsening pneumonia and/or malignancy. Incompletely evaluated right upper pole lesion appears larger than on prior exam, suspicious for metastasis. Dedicated abdominal imaging is recommended. Examination was marked in EPIC for immediate notification. 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 COMPARISON: MRI brain 10/21/2024. MRI brain brain tumor protocol 10/8/2024. TECHNIQUE: Multiplanar, multisequence imaging of the brain and sella was performed before and after gadolinium administration. IV Contrast:  10 mL of Gadobutrol injection (SINGLE-DOSE) IMAGE QUALITY:   Motion-degraded, which reduces diagnostic sensitivity. FINDINGS: Multiple enhancing metastatic lesions as detailed below with comparison made to MRI brain tumor protocol 10/8/2024. 1. Stable tiny, subtle enhancing lesion in the left frontal lobe on series 13, image 121. 2. Increased size of a now 1.1 cm lesion in the left paramedian parietal lobe on series 13, image 105, previously 0.6 cm. 3. Decreased size of a now tiny lesion in the left paramedian frontal lobe on series 13, image 102 that previously measured 3 mm. 4. Increased size of a now 3.5 cm left occipital lesion (centrally hypoenhancing) on series 13, image 81 that previously measured 2.2 cm. 5. Left temporal lesion that was previously 4 mm is not as conspicuous/barely perceptible on the current study. 6. Decreased size of a now tiny lesion in the midline cerebellar vermis on series 13, image 47, previously 2 mm. There is a new lesion  identified in the left frontal lobe on series 13, image 106 that measures 4 mm. There is slightly increased vasogenic edema in the left parietal and occipital lobes, when compared to the prior study, with mild mass effect on the left lateral ventricle. Slightly less conspicuous enhancement involving the bilateral cranial nerve VII and IACs. No acute infarct. Mild chronic ischemic changes of the white matter. PERFUSION: While most of the lesions above are too small to characterize by perfusion, there is no appreciable elevated perfusion associated with the left occipital and left parietal lobe lesions. VENTRICLES: As above. No hydrocephalus. SELLA AND PITUITARY GLAND:  Normal. ORBITS: No acute abnormality. PARANASAL SINUSES: Trace mucosal thickening. VASCULATURE:  Evaluation of the major intracranial vasculature demonstrates appropriate flow voids. CALVARIUM AND SKULL BASE: No acute abnormality. EXTRACRANIAL SOFT TISSUES: No acute abnormality.     Impression: 1. New enhancing lesion in the left frontal lobe, which is suspicious for metastasis. 2. Increased size of left occipital and left paramedian parietal lobe lesions without appreciable elevated perfusion. Findings favor posttreatment sequelae/radiation effects though recommend continued attention on follow-up. Of note, there is slightly increased associated vasogenic edema in the left cerebral hemisphere, as described above. 3. The other lesions are stable to decreased/less conspicuous compared to the prior study. 4. 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 Recommend continued clinical and imaging surveillance. The study was marked in EPIC for significant notification. Workstation performed: [id number]       Labs and pertinent reports reviewed.

## 2025-01-24 NOTE — SEPSIS NOTE
"  Sepsis Note   Ayla Nye 74 y.o. female MRN: [id number]  Unit/Bed#: ED-41 Encounter: [id number]       Initial Sepsis Screening       Row Name 01/23/25 2224                Is the patient's history suggestive of a new or worsening infection? Yes (Proceed)  -RG        Suspected source of infection pneumonia  -RG        Indicate SIRS criteria Hyperthemia > 38.3C (100.9F) OR Hypothermia <36C (96.8F);Tachycardia > 90 bpm;Leukocytosis (WBC > 82064 IJL) OR Leukopenia (WBC <4000 IJL) OR Bandemia (WBC >10% bands)  -RG        Are two or more of the above signs & symptoms of infection both present and new to the patient? Yes (Proceed)  -RG        Assess for evidence of organ dysfunction: Are any of the below criteria present within 6 hours of suspected infection and SIRS criteria that are NOT considered to be chronic conditions? Lactate > 2.0  -RG        Date of presentation of severe sepsis 01/23/25  -RG        Time of presentation of severe sepsis 2225  -RG        Sepsis Note: Click \"NEXT\" below (NOT \"close\") to generate sepsis note based on above information. --                  User Key  (r) = Recorded By, (t) = Taken By, (c) = Cosigned By      Initials Name Provider Type    CHIKA Patten PA-C Physician Assistant                    Default Flowsheet Data (Last 720 Hours)       Sepsis Reassess       Row Name 01/23/25 2357 01/23/25 2327                Repeat Volume Status and Tissue Perfusion Assessment Performed    Date of Reassessment: 01/23/25  -RG 01/23/25  -RG       Time of Reassessment: 2327  -RG 2325  -RG       Sepsis Reassessment Note: Click \"NEXT\" below (NOT \"close\") to generate sepsis reassessment note. YES (proceed by clicking \"NEXT\")  -RG YES (proceed by clicking \"NEXT\")  -RG       Repeat Volume Status and Tissue Perfusion Assessment Performed -- --                 User Key  (r) = Recorded By, (t) = Taken By, (c) = Cosigned By      Initials Name Provider Type    CHIKA Patten PA-C Physician " Assistant                    There is no height or weight on file to calculate BMI.  Wt Readings from Last 1 Encounters:   01/23/25 56.1 kg (123 lb 10.9 oz)     IBW (Ideal Body Weight): 50.12 kg    Ideal body weight: 50.1 kg (110 lb 7.9 oz)  Adjusted ideal body weight: 52.5 kg (115 lb 12.3 oz)

## 2025-01-24 NOTE — ASSESSMENT & PLAN NOTE
Diagnosed on last admission in December.  Likely in the setting of patient's metastatic cancer.  Ongoing anticoagulation as per primary.  Will follow-up CT PE imaging.  Additional care as above.

## 2025-01-24 NOTE — ED PROVIDER NOTES
"Time reflects when diagnosis was documented in both MDM as applicable and the Disposition within this note       Time User Action Codes Description Comment    1/24/2025 12:06 AM Medhat Patten [A41.9,  R65.20] Severe sepsis (HCC)     1/24/2025 12:06 AM Medhat Patten [R91.8] Mass of upper lobe of right lung     1/24/2025 12:06 AM Medhat Patten [R91.8] Lung nodules     1/24/2025 12:09 AM Medhat Patten [N28.89] Right kidney mass     1/24/2025 12:09 AM Medhat Patten [R19.00] Abdominal mass     1/24/2025 12:43 AM Medhat Patten [D72.825] Bandemia     1/24/2025 12:44 AM Medhat Patten [D72.819] Leukopenia           ED Disposition       ED Disposition   Admit    Condition   Stable    Date/Time   Fri Jan 24, 2025 12:44 AM    Comment   Case was discussed with hemanth and the patient's admission status was agreed to be Admission Status: inpatient status to the service of Dr. Kam, internal medicine.               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     Severe sepsis with oral temperature 1 to 1.2 °F, initial tachycardia 101, leukocytosis 2.36, bandemia 9%, calcitonin 0.35, lactic acidosis 2.3  Procalcitonin 8.35    Normal electrolytes, normal kidney function  Negative COVID flu RSV; negative rapid strep    Urinalysis unremarkable for urinary tract infection    CT CAP- . \" Findings consistent with disease progression, with rapid interval enlargement of left lung pulmonary nodules when compared to the CT 1/14/2025, increase in right upper lobe mass, and increase in right renal mass. There has been interval " "decrease in size of right ischiorectal fossa mass when compared to 11/24/2024. 2. Bilateral perinephric stranding, with right urothelial thickening, concerning for urinary tract infection   3. Mild T5 compression deformity, appears subacute\"  Upper and lower extremities 5 out of 5 strength, neurovascularly intact  Bacteremia versus UTI/pyelonephritis versus URI    Maxipime, 2 L normal saline solution, IV Tylenol    Discussed patient case with internal medicine team and both agreed to place patient in inpatient status on the care of Dr. Kam, internal medicine  Patient with verbal understandable clinical laboratory and imaging findings, admission instructions and verbalized agreement with patient current treatment plan with teach back    Amount and/or Complexity of Data Reviewed  Labs: ordered. Decision-making details documented in ED Course.  Radiology: ordered and independent interpretation performed. Decision-making details documented in ED Course.  ECG/medicine tests: ordered and independent interpretation performed. Decision-making details documented in ED Course.    Risk  Prescription drug management.  Decision regarding hospitalization.             Medications   acetaminophen (Ofirmev) injection 1,000 mg (0 mg Intravenous Stopped 1/23/25 2342)   sodium chloride 0.9 % bolus 1,000 mL (1,000 mL Intravenous New Bag 1/23/25 2257)   cefepime (MAXIPIME) 2 g/50 mL dextrose IVPB (0 mg Intravenous Stopped 1/23/25 2342)   sodium chloride 0.9 % bolus 1,000 mL (1,000 mL Intravenous New Bag 1/23/25 2342)   iohexol (OMNIPAQUE) 350 MG/ML injection (MULTI-DOSE) 90 mL (90 mL Intravenous Given 1/23/25 2246)       ED Risk Strat Scores                                              History of Present Illness       Chief Complaint   Patient presents with    Fever     Pt arrives via [facility] from home. Had chemo today around 1300, fever, weakness, and sob. 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  Patient also has associate symptomatology of fever with a normal Tmax of 101.2 °F.  History of pulmonary embolism currently on Xarelto. Patient with recent oncology treatment today, see oncology treatment note; carboplatin and pemetrexed with low dose Avastin.  Patient reported that she had some nausea symptoms that were improved with Zofran.  Patient stated her last Tylenol dosage was at 1600 today.  Patient with recent ED to hospital admission on 12/30/2024 for HAP, and dyspnea.  Patient affirms palliative factors of Tylenol and Zofran and denies provocative factors.  Patient denies noneffective treatment.  Patient denies chills, vomiting, diarrhea, constipation urinary symptoms.  Patient denies recent fall and recent trauma.  Patient is unaware of possible sick contacts.  Patient denies recent travel.  Patient denies chest pain and abdominal pain.      Past Medical History:   Diagnosis Date    Allergic 2022    Allergic to neomiacin and cephalexin    Cancer (HCC)     lung cancer    Cancer (HCC)     mets to brain    Cancer (HCC)     perianal mass    Cataract Nov. 2023    Due to have durgery June 2024    Essential thrombocytosis (HCC)     controlled with medication    History of transfusion     Hyperlipidemia     Hypertension     Mass in chest     Nodular goiter     Osteoporosis     Peripheral neuropathy     Pneumonia Oct 16, 2023    Also  Feb 2024    Psoriasis     SIRS (systemic inflammatory response syndrome) (HCC) 06/22/2024      Past Surgical History:   Procedure Laterality Date    APPENDECTOMY      BREAST CYST EXCISION Right 2009    COLONOSCOPY  10/31/2018    DXA PROCEDURE  (HISTORICAL)  2017    IR BIOPSY OTHER  2024    IR LUMBAR PUNCTURE  2024    MAMMO (HISTORICAL)      8-24-18    MAMMO NEEDLE LOCALIZATION RIGHT (ALL INC) Right 2009    SKIN LESION EXCISION      bridge of nose      Family History   Problem Relation Age of Onset    Stroke Mother     Depression Mother             Hypertension Mother     Hearing loss Mother     Tuberculosis Father     Cancer Brother         lung    Tuberculosis Brother     Coronary artery disease Brother     Hypertension Brother     No Known Problems Daughter     No Known Problems Daughter     No Known Problems Maternal Grandmother     No Known Problems Maternal Grandfather     No Known Problems Paternal Grandmother     No Known Problems Paternal Grandfather     No Known Problems Maternal Aunt     No Known Problems Maternal Aunt     No Known Problems Maternal Aunt     No Known Problems Maternal Aunt     No Known Problems Paternal Aunt     Cancer Brother         Throat canver      Social History     Tobacco Use    Smoking status: Former     Current packs/day: 1.00     Average packs/day: 1 pack/day for 59.1 years (59.1 ttl pk-yrs)     Types: Cigarettes     Start date:      Passive exposure: Past    Smokeless tobacco: Never    Tobacco comments:     Quit    Vaping Use    Vaping status: Never Used   Substance Use Topics    Alcohol use: Yes     Alcohol/week: 5.0 standard drinks of alcohol     Types: 5 Glasses of wine per week    Drug use: Never      E-Cigarette/Vaping    E-Cigarette Use Never User       E-Cigarette/Vaping Substances    Nicotine No     THC No     CBD No     Flavoring No     Other No     Unknown No       I have reviewed and agree with the history as documented.       History provided by:  Patient   used: No    Fever  Associated symptoms: fever and nausea    Associated symptoms: no abdominal pain, no chest pain, no congestion, no cough, no diarrhea, no ear pain, no headaches, no rash, no  rhinorrhea, no shortness of breath, no sore throat and no vomiting        Review of Systems   Constitutional:  Positive for fever. Negative for activity change, appetite change and chills.   HENT:  Negative for congestion, ear pain, postnasal drip, rhinorrhea, sinus pressure, sinus pain, sore throat and tinnitus.    Eyes:  Negative for photophobia, pain and visual disturbance.   Respiratory:  Negative for cough, chest tightness and shortness of breath.    Cardiovascular:  Negative for chest pain and palpitations.   Gastrointestinal:  Positive for nausea. Negative for abdominal pain, constipation, diarrhea and vomiting.   Genitourinary:  Positive for flank pain. Negative for difficulty urinating, dysuria, frequency, hematuria and urgency.   Musculoskeletal:  Positive for back pain. Negative for arthralgias, gait problem, neck pain and neck stiffness.   Skin:  Negative for color change, pallor and rash.   Allergic/Immunologic: Negative for environmental allergies and food allergies.   Neurological:  Negative for dizziness, seizures, syncope, weakness, numbness and headaches.   Psychiatric/Behavioral:  Negative for confusion.    All other systems reviewed and are negative.          Objective       ED Triage Vitals [01/23/25 2045]   Temperature Pulse Blood Pressure Respirations SpO2 Patient Position - Orthostatic VS   (!) 101.2 °F (38.4 °C) 101 120/64 20 93 % --      Temp Source Heart Rate Source BP Location FiO2 (%) Pain Score    Oral Monitor Right arm -- --      Vitals      Date and Time Temp Pulse SpO2 Resp BP Pain Score FACES Pain Rating User   01/24/25 0045 -- 80 94 % -- -- -- -- KB   01/24/25 0036 -- -- 93 % -- -- -- -- BAMBI   01/24/25 0030 98.5 °F (36.9 °C) 83 89 % -- 117/61 -- -- BAMBI   01/24/25 0000 -- 87 -- -- 113/56 -- -- KB   01/23/25 2300 -- 91 95 % -- 114/63 -- -- KB   01/23/25 2230 -- 98 95 % -- 117/60 -- -- KB   01/23/25 2045 101.2 °F (38.4 °C) 101 93 % 20 120/64 -- -- BAMBI            Physical Exam  Vitals and  nursing note reviewed.   Constitutional:       General: She is awake.      Appearance: Normal appearance. She is well-developed and normal weight. She is ill-appearing. She is not toxic-appearing or diaphoretic.      Comments: /64 (BP Location: Right arm)   Pulse 101   Temp (!) 101.2 °F (38.4 °C) (Oral)   Resp 20   SpO2 93%      HENT:      Head: Normocephalic and atraumatic.      Jaw: There is normal jaw occlusion.      Right Ear: Hearing, tympanic membrane, ear canal and external ear normal. No decreased hearing noted. No drainage, swelling or tenderness. No mastoid tenderness.      Left Ear: Hearing, tympanic membrane, ear canal and external ear normal. No decreased hearing noted. No drainage, swelling or tenderness. No mastoid tenderness.      Nose: Nose normal.      Mouth/Throat:      Lips: Pink.      Mouth: Mucous membranes are moist.      Pharynx: Oropharynx is clear. Uvula midline.   Eyes:      General: Lids are normal. Vision grossly intact. Gaze aligned appropriately.         Right eye: No discharge.         Left eye: No discharge.      Extraocular Movements: Extraocular movements intact.      Conjunctiva/sclera: Conjunctivae normal.      Pupils: Pupils are equal, round, and reactive to light.   Neck:      Vascular: No JVD.      Trachea: Trachea and phonation normal. No tracheal tenderness or tracheal deviation.   Cardiovascular:      Rate and Rhythm: Normal rate and regular rhythm.      Pulses: Normal pulses.           Radial pulses are 2+ on the right side and 2+ on the left side.        Posterior tibial pulses are 2+ on the right side and 2+ on the left side.      Heart sounds: Normal heart sounds.   Pulmonary:      Effort: Pulmonary effort is normal.      Breath sounds: Normal breath sounds and air entry. No stridor. No decreased breath sounds, wheezing, rhonchi or rales.   Chest:      Chest wall: No tenderness.   Abdominal:      General: Abdomen is flat. Bowel sounds are normal. There is no  distension.      Palpations: Abdomen is soft. Abdomen is not rigid.      Tenderness: There is no abdominal tenderness. There is right CVA tenderness and left CVA tenderness. There is no guarding or rebound.   Musculoskeletal:         General: Normal range of motion.      Cervical back: Full passive range of motion without pain, normal range of motion and neck supple. No rigidity. No spinous process tenderness or muscular tenderness. Normal range of motion.   Feet:      Right foot:      Toenail Condition: Right toenails are normal.      Left foot:      Toenail Condition: Left toenails are normal.   Lymphadenopathy:      Head:      Right side of head: No submental, submandibular, tonsillar, preauricular, posterior auricular or occipital adenopathy.      Left side of head: No submental, submandibular, tonsillar, preauricular, posterior auricular or occipital adenopathy.      Cervical: No cervical adenopathy.      Right cervical: No superficial, deep or posterior cervical adenopathy.     Left cervical: No superficial, deep or posterior cervical adenopathy.   Skin:     General: Skin is warm.      Capillary Refill: Capillary refill takes less than 2 seconds.      Findings: No rash.   Neurological:      General: No focal deficit present.      Mental Status: She is alert and oriented to person, place, and time. Mental status is at baseline.      GCS: GCS eye subscore is 4. GCS verbal subscore is 5. GCS motor subscore is 6.      Sensory: No sensory deficit.      Deep Tendon Reflexes: Reflexes are normal and symmetric.      Reflex Scores:       Patellar reflexes are 2+ on the right side and 2+ on the left side.  Psychiatric:         Attention and Perception: Attention normal.         Mood and Affect: Mood normal.         Speech: Speech normal.         Behavior: Behavior normal. Behavior is cooperative.         Thought Content: Thought content normal.         Judgment: Judgment normal.         Results Reviewed       Procedure  Component Value Units Date/Time    Lactic acid 2 Hours [[id number]]  (Normal) Collected: 01/24/25 0009    Lab Status: Final result Specimen: Blood from Arm, Left Updated: 01/24/25 0102     LACTIC ACID 1.9 mmol/L     Narrative:      Result may be elevated if tourniquet was used during collection.    Urine Microscopic [[id number]]  (Normal) Collected: 01/23/25 2335    Lab Status: Final result Specimen: Urine, Clean Catch Updated: 01/24/25 0017     RBC, UA 1-2 /hpf      WBC, UA 1-2 /hpf      Epithelial Cells Occasional /hpf      Bacteria, UA None Seen /hpf     UA w Reflex to Microscopic w Reflex to Culture [[id number]]  (Abnormal) Collected: 01/23/25 2335    Lab Status: Final result Specimen: Urine, Clean Catch Updated: 01/24/25 0004     Color, UA Light Yellow     Clarity, UA Clear     Specific Gravity, UA 1.034     pH, UA 5.0     Leukocytes, UA Negative     Nitrite, UA Negative     Protein, UA Trace mg/dl      Glucose, UA Negative mg/dl      Ketones, UA Negative mg/dl      Urobilinogen, UA 4.0 mg/dl      Bilirubin, UA Small     Occult Blood, UA Small    FLU/RSV/COVID - if FLU/RSV clinically relevant (2hr TAT) [[id number]]  (Normal) Collected: 01/23/25 2226    Lab Status: Final result Specimen: Nares from Nose Updated: 01/23/25 2317     SARS-CoV-2 Negative     INFLUENZA A PCR Negative     INFLUENZA B PCR Negative     RSV PCR Negative    Narrative:      This test has been performed using the CoV-2/Flu/RSV plus assay on the [employer] GeneXpert platform. This test has been validated by the  and verified by the performing laboratory.     This test is designed to amplify and detect the following: nucleocapsid (N), envelope (E), and RNA-dependent RNA polymerase (RdRP) genes of the SARS-CoV-2 genome; matrix (M), basic polymerase (PB2), and acidic protein (PA) segments of the influenza A genome; matrix (M) and non-structural protein (NS) segments of the influenza B genome, and the nucleocapsid genes of RSV A and RSV B.      Positive results are indicative of the presence of Flu A, Flu B, RSV, and/or SARS-CoV-2 RNA. Positive results for SARS-CoV-2 or suspected novel influenza should be reported to state, local, or federal health departments according to local reporting requirements.      All results should be assessed in conjunction with clinical presentation and other laboratory markers for clinical management.     FOR PEDIATRIC PATIENTS - copy/paste COVID Guidelines URL to browser: [email or web address]       Strep A PCR [[id number]]  (Normal) Collected: 01/23/25 2226    Lab Status: Final result Specimen: Throat Updated: 01/23/25 2305     STREP A PCR Not Detected    RBC Morphology Reflex Test [[id number]] Collected: 01/23/25 2145    Lab Status: Final result Specimen: Blood from Arm, Right Updated: 01/23/25 2301    CBC and differential [[id number]]  (Abnormal) Collected: 01/23/25 2145    Lab Status: Final result Specimen: Blood from Arm, Right Updated: 01/23/25 2233     WBC 2.36 Thousand/uL      RBC 3.36 Million/uL      Hemoglobin 10.1 g/dL      Hematocrit 33.5 %       fL      MCH 30.1 pg      MCHC 30.1 g/dL      RDW 16.1 %      MPV 9.2 fL      Platelets 185 Thousands/uL     Manual Differential(PHLEBS Do Not Order) [[id number]]  (Abnormal) Collected: 01/23/25 2145    Lab Status: Final result Specimen: Blood from Arm, Right Updated: 01/23/25 2233     Segmented % 88 %      Bands % 9 %      Lymphocytes % 0 %      Monocytes % 1 %      Eosinophils % 0 %      Basophils % 0 %      Metamyelocytes % 1 %      Myelocytes % 1 %      Absolute Neutrophils 2.29 Thousand/uL      Absolute Lymphocytes 0.00 Thousand/uL      Absolute Monocytes 0.02 Thousand/uL      Absolute Eosinophils 0.00 Thousand/uL      Absolute Basophils 0.00 Thousand/uL      Absolute Metamyelocytes 0.02 Thousand/uL      Absolute Myelocytes 0.02 Thousand/uL      Total Counted --     Toxic Vacuolization Present     RBC  Morphology Present     Platelet Estimate Adequate     Anisocytosis Present     Polychromasia Present    Protime-INR [[id number]]  (Abnormal) Collected: 01/23/25 2145    Lab Status: Final result Specimen: Blood from Arm, Right Updated: 01/23/25 2226     Protime 16.4 seconds      INR 1.24    Narrative:      INR Therapeutic Range    Indication                                             INR Range      Atrial Fibrillation                                               2.0-3.0  Hypercoagulable State                                    2.0.2.3  Left Ventricular Asist Device                            2.0-3.0  Mechanical Heart Valve                                  -    Aortic(with afib, MI, embolism, HF, LA enlargement,    and/or coagulopathy)                                     2.0-3.0 (2.5-3.5)     Mitral                                                             2.5-3.5  Prosthetic/Bioprosthetic Heart Valve               2.0-3.0  Venous thromboembolism (VTE: VT, PE        2.0-3.0    APTT [[id number]]  (Normal) Collected: 01/23/25 2145    Lab Status: Final result Specimen: Blood from Arm, Right Updated: 01/23/25 2226     PTT 24 seconds     Comprehensive metabolic panel [[id number]]  (Abnormal) Collected: 01/23/25 2145    Lab Status: Final result Specimen: Blood from Arm, Right Updated: 01/23/25 2224     Sodium 141 mmol/L      Potassium 3.7 mmol/L      Chloride 107 mmol/L      CO2 23 mmol/L      ANION GAP 11 mmol/L      BUN 21 mg/dL      Creatinine 0.98 mg/dL      Glucose 124 mg/dL      Calcium 8.8 mg/dL      AST 19 U/L      ALT 15 U/L      Alkaline Phosphatase 52 U/L      Total Protein 6.3 g/dL      Albumin 3.7 g/dL      Total Bilirubin 0.28 mg/dL      eGFR 57 ml/min/1.73sq m     Narrative:      National Kid[employer] Disease Foundation guidelines for Chronic Kidney Disease (CKD):     Stage 1 with normal or high GFR (GFR > 90 mL/min/1.73 square meters)    Stage 2 Mild CKD (GFR = 60-89 mL/min/1.73 square meters)    Stage 3A  Moderate CKD (GFR = 45-59 mL/min/1.73 square meters)    Stage 3B Moderate CKD (GFR = 30-44 mL/min/1.73 square meters)    Stage 4 Severe CKD (GFR = 15-29 mL/min/1.73 square meters)    Stage 5 End Stage CKD (GFR <15 mL/min/1.73 square meters)  Note: GFR calculation is accurate only with a steady state creatinine    Procalcitonin [[id number]]  (Abnormal) Collected: 01/23/25 2145    Lab Status: Final result Specimen: Blood from Arm, Right Updated: 01/23/25 2224     Procalcitonin 8.35 ng/ml     Lactic acid, plasma (w/reflex if result > 2.0) [[id number]]  (Abnormal) Collected: 01/23/25 2145    Lab Status: Final result Specimen: Blood from Arm, Left Updated: 01/23/25 2214     LACTIC ACID 2.3 mmol/L     Narrative:      Result may be elevated if tourniquet was used during collection.    Blood culture #1 [[id number]] Collected: 01/23/25 2145    Lab Status: In process Specimen: Blood from Line, Venous Updated: 01/23/25 2156    Blood culture #2 [[id number]] Collected: 01/23/25 2145    Lab Status: In process Specimen: Blood from Arm, Right Updated: 01/23/25 2156            CT chest abdomen pelvis w contrast   ED Interpretation by Medhat Patten PA-C (01/24 0005)   Reading Physician Reading Date Result Priority  Felipe Wood MD  [phone]    1/23/2025 STAT    Narrative & Impression  CT CHEST, ABDOMEN AND PELVIS WITH IV CONTRAST     INDICATION: fever and bilateral lumbar/flank pain; sepsis.     COMPARISON: CT 1/14/2025, 12/30/2024, 11/24/2024, 10/22/2024     TECHNIQUE: CT examination of the chest, abdomen and pelvis was performed. Multiplanar 2D reformatted images were created from the source data.     This examination, like all CT scans performed in the [facility], was performed utilizing techniques to minimize radiation dose exposure, including the use of iterative reconstruction and automated exposure control. Radiation dose length   product (DLP) for this visit: 315 mGy-cm     IV Contrast: 90 mL of iohexol  (OMNIPAQUE)  Enteric Contrast: Not administered.     FINDINGS:     CHEST     LUNGS: Right upper lobe mass measures 3.2 x 2.4 cm, shows progressive enlargement. There is currently more abutment with the adjacent mediasti   num then when compared to the most recent prior.  Parenchymal scarring in the right upper lobe and right lower lobe superior segments, likely radiation changes.     16 mm lingular nodule series 301/82, 8 mm on most recent prior  15 mm left lower lobe nodule series 301/82, 13 mm on most recent prior     PLEURA: Unremarkable.     HEART/GREAT VESSELS: Heart is unremarkable for patient's age. No thoracic aortic aneurysm.     MEDIASTINUM AND SUDEEP: Unremarkable.     CHEST WALL AND LOWER NECK: Unremarkable.     ABDOMEN     LIVER/BILIARY TREE: Unremarkable.     GALLBLADDER: No calcified gallstones. No pericholecystic inflammatory change.     SPLEEN: Unremarkable.     PANCREAS: Unremarkable.     ADRENAL GLANDS: Unremarkable.     KIDNEYS/URETERS: Right upper pole ill-defined hypoenhancing mass measures 5.5 x 4.2 cm on series 301/108, this measured as large as 4.5 cm on the most recent prior CT abdomen based off of my measurement     Interval increase of bilateral perinephric stranding, even whe   n compared to the most recent chest CT.     There is mild right urothelial thickening in the renal pelvis     STOMACH AND BOWEL: Colonic diverticulosis without findings of acute diverticulitis.     APPENDIX: No findings to suggest appendicitis.     ABDOMINOPELVIC CAVITY: 3.6 x 2.5 cm right ischiorectal fossa mass, series 301/227, decreased in size     VESSELS: Unremarkable for patient's age.     PELVIS     REPRODUCTIVE ORGANS: Unremarkable for patient's age.     URINARY BLADDER: Unremarkable.     ABDOMINAL WALL/INGUINAL REGIONS: Unremarkable.     BONES: Mild T5 superior endplate compression, in retrospect, there may have been a mild superior endplate compression on the most recent chest CT, which is now more  pronounced, with surrounding sclerosis suggesting healing.     IMPRESSION:     1.  Findings consistent with disease progression, with rapid interval enlargement of left lung pulmonary nodules when compared to the CT 1/14/2025, increase in right upper lobe mass, and increase in rig   ht renal mass. There has been interval decrease in   size of right ischiorectal fossa mass when compared to 11/24/2024.  2.  Bilateral perinephric stranding, with right urothelial thickening, concerning for urinary tract infection  3.  Mild T5 compression deformity, appears subacute     The study was marked in EPIC for immediate notification.        Workstation performed: [id number]        Final Interpretation by Felipe Wood MD (01/23 7041)      1.  Findings consistent with disease progression, with rapid interval enlargement of left lung pulmonary nodules when compared to the CT 1/14/2025, increase in right upper lobe mass, and increase in right renal mass. There has been interval decrease in    size of right ischiorectal fossa mass when compared to 11/24/2024.   2.  Bilateral perinephric stranding, with right urothelial thickening, concerning for urinary tract infection   3.  Mild T5 compression deformity, appears subacute      The study was marked in EPIC for immediate notification.         Workstation performed: [id number]             ECG 12 Lead Documentation Only    Date/Time: 1/23/2025 9:08 PM    Performed by: Medhat Patten PA-C  Authorized by: Medhat Patten PA-C    Indications / Diagnosis:  Sepsis  ECG reviewed by me, the ED Provider: yes    Patient location:  ED  Previous ECG:     Previous ECG:  Compared to current    Comparison ECG info:  When compared with ECG 12/31/2024, no significant changes were noted.    Similarity:  No change    Comparison to cardiac monitor: Yes    Interpretation:     Interpretation: normal    Rate:     ECG rate:  103    ECG rate assessment: tachycardic    Rhythm:     Rhythm: sinus tachycardia     Ectopy:     Ectopy: none    QRS:     QRS axis:  Normal    QRS intervals:  Normal  Conduction:     Conduction: normal    ST segments:     ST segments:  Normal  T waves:     T waves: normal        ED Medication and Procedure Management   Prior to Admission Medications   Prescriptions Last Dose Informant Patient Reported? Taking?   Probiotic Product (PROBIOTIC DAILY PO)  Self Yes No   Sig: Take 1 capsule by mouth daily ON HOLD   Patient not taking: Reported on 1/23/2025   amLODIPine (NORVASC) 5 mg tablet   No No   Sig: TAKE 1 TABLET(5 MG) BY MOUTH DAILY   dexamethasone (DECADRON) 4 mg tablet   No No   Sig: Take 1 tablet (4 mg total) by mouth every 8 (eight) hours for 5 days, THEN 1 tablet (4 mg total) every 12 (twelve) hours for 7 days, THEN 1.5 tablets (6 mg total) in the morning for 7 days, THEN 0.5 tablets (2 mg total) 2 (two) times a day for 7 days, THEN 0.5 tablets (2 mg total) in the morning for 7 days. When you get to the 6 mg dosage please take 4 mg in the morning followed by 2 mg in the evening; that would be 1 tablet in the morning followed by 1/2 tablet in the evening for that week..   folic acid (FOLVITE) 1 mg tablet   No No   Sig: Take 1 tablet (1 mg total) by mouth daily   gabapentin (NEURONTIN) 100 mg capsule   No No   Sig: TAKE 1 CAPSULE BY MOUTH EVERY MORNING, 1 CAPSULE EVERY AFTERNOON AND 3 CAPSULES EVERY NIGHT AT BEDTIME   levETIRAcetam (KEPPRA) 1000 MG tablet   No No   Sig: TAKE 1 TABLET(1000 MG) BY MOUTH EVERY 12 HOURS   levothyroxine 50 mcg tablet   No No   Sig: TAKE 1 TABLET(50 MCG) BY MOUTH DAILY EARLY MORNING   pantoprazole (PROTONIX) 40 mg tablet   No No   Sig: Take 1 tablet (40 mg total) by mouth daily in the early morning   rivaroxaban (Xarelto) 20 mg tablet   No No   Sig: Take 1 tablet (20 mg total) by mouth daily with breakfast   senna (SENOKOT) 8.6 MG tablet  Self Yes No   Sig: Take 1 tablet by mouth daily Takes one every other day   Patient taking differently: Take 2 tablets by mouth  daily Takes two every other day   sulfamethoxazole-trimethoprim (BACTRIM DS) 800-160 mg per tablet   No No   Sig: Take 1 tablet by mouth daily Starting 12/24, you can take one tablet Monday, Wednesday, and Fridays. Do not start before December 24, 2024.   vitamin B-12 (VITAMIN B-12) 1,000 mcg tablet  Self No No   Sig: Take 1 tablet (1,000 mcg total) by mouth daily      Facility-Administered Medications: None     Patient's Medications   Discharge Prescriptions    No medications on file     No discharge procedures on file.  ED SEPSIS DOCUMENTATION   Time reflects when diagnosis was documented in both MDM as applicable and the Disposition within this note       Time User Action Codes Description Comment    1/24/2025 12:06 AM Medhat Patten [A41.9,  R65.20] Severe sepsis (HCC)     1/24/2025 12:06 AM Medhat Patten [R91.8] Mass of upper lobe of right lung     1/24/2025 12:06 AM Medhat Patten [R91.8] Lung nodules     1/24/2025 12:09 AM Medhat Patten [N28.89] Right kidney mass     1/24/2025 12:09 AM Medhat Patten [R19.00] Abdominal mass     1/24/2025 12:43 AM Medhat Patten [D72.825] Bandemia     1/24/2025 12:44 AM Medhat Patten [D72.819] Leukopenia            Initial Sepsis Screening       Row Name 01/23/25 3516                Is the patient's history suggestive of a new or worsening infection? Yes (Proceed)  -RG        Suspected source of infection pneumonia  -RG        Indicate SIRS criteria Hyperthemia > 38.3C (100.9F) OR Hypothermia <36C (96.8F);Tachycardia > 90 bpm;Leukocytosis (WBC > 00676 IJL) OR Leukopenia (WBC <4000 IJL) OR Bandemia (WBC >10% bands)  -RG        Are two or more of the above signs & symptoms of infection both present and new to the patient? Yes (Proceed)  -RG        Assess for evidence of organ dysfunction: Are any of the below criteria present within 6 hours of suspected infection and SIRS criteria that are NOT considered to be chronic conditions? Lactate > 2.0  -RG         "Date of presentation of severe sepsis 01/23/25  -RG        Time of presentation of severe sepsis 2225  -RG        Sepsis Note: Click \"NEXT\" below (NOT \"close\") to generate sepsis note based on above information. --                  User Key  (r) = Recorded By, (t) = Taken By, (c) = Cosigned By      Initials Name Provider Type    CHIKA Patten PA-C Physician Assistant                  Default Flowsheet Data (Last 720 Hours)       Sepsis Reassess       Row Name 01/23/25 2357 01/23/25 2327                Repeat Volume Status and Tissue Perfusion Assessment Performed    Date of Reassessment: 01/23/25  -RG 01/23/25  -RG       Time of Reassessment: 2327  -RG 2325  -RG       Sepsis Reassessment Note: Click \"NEXT\" below (NOT \"close\") to generate sepsis reassessment note. YES (proceed by clicking \"NEXT\")  -RG YES (proceed by clicking \"NEXT\")  -RG       Repeat Volume Status and Tissue Perfusion Assessment Performed -- --                 User Key  (r) = Recorded By, (t) = Taken By, (c) = Cosigned By      Initials Name Provider Type    CHIKA Patten PA-C Physician Assistant                     Medhat Patten PA-C  01/24/25 0125       Medhat Patten PA-C  01/24/25 0126    "

## 2025-01-24 NOTE — CASE MANAGEMENT
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     Palliative care patient 2024     Cancer associated pain 2024     Goals of care, counseling/discussion 2024     Right hip pain 09/10/2024     Altered mental status 2024     Hypothyroidism 2024     Abnormal imaging of central nervous system 2024     Acute metabolic encephalopathy 2024     Lung cancer metastatic to brain (HCC) 2024     Brain mass 2024     Metastatic cancer to brain (HCC) 2024     Visual disturbance 2024     Dehydration 2024     Moderate protein-calorie malnutrition (HCC) 2024     Bilateral leg pain 2024     Insomnia 2024     Malignant neoplasm of upper lobe, right bronchus or lung (HCC) 2024     Chronic kidney disease, stage 3b (HCC) 2024     Age-related osteoporosis without current pathological fracture 2023     Encounter for monitoring of hydroxyurea therapy 2023     JAK2 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 2023     Hypertension 08/10/2022     Mixed hyperlipidemia 08/10/2022     Essential thrombocytosis (HCC) 2020     TB lung, 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     Psoriatic arthritis (HCC) 09/10/2019       LOS (days): 0  Geometric Mean LOS (GMLOS) (days): 3.5  Days to GMLOS:3.1     OBJECTIVE:  PATIENT  READMITTED TO HOSPITAL  Risk of Unplanned Readmission Score: 61.14         Current admission status: Inpatient       Preferred Pharmacy:   [id number] - [facility] [place] - [address]  [address] [place] [address]  Phone: [phone] Fax: [phone]    [facility] - [place] - [address]  [address]  [place] [address]  Phone: [phone] Fax: [phone]    Primary Care Provider: Rafy Angelo MD    Primary Insurance: MEDICARE  Secondary Insurance: AARP    ASSESSMENT:  Active Health Care Proxies       Maribel Cama[facility] Alternate Health Care Agent - Daughter   Primary Phone: [phone] (Mobile)  Home Phone: [phone]                           Readmission Root Cause  30 Day Readmission: Yes  During your hospital stay, did someone (provider, nurse, ) explain your care to you in a way you could understand?: Yes  Did you feel medically stable to leave the hospital?: Yes  Were you able to pay for your medication at the pharmacy?: Yes  Did you have reliable transportation to take you to your appointments?: Yes  During previous admission, was a post-acute recommendation made?: No  Patient was readmitted due to: Sepsis  Action Plan: iv abx, chest CTA    Patient Information  Admitted from:: Home  Mental Status: Alert  During Assessment patient was accompanied by: Daughter  Assessment information provided by:: Daughter (Maribel)  Primary Caregiver: Family  Caregiver's Name:: maribel  Caregiver's Relationship to Patient:: Family Member  Caregiver's Telephone Number:: [phone]  Support Systems: Children  County of Residence: [place]  What city do you live in?: [place]  Home entry access options. Select all that apply.: Stairs  Number of steps to enter home.: 6  Do the steps have railings?: Yes  Type of Current Residence: [address] [place] home  Upon entering residence, is there a bedroom on the main floor (no  further steps)?: No  A bedroom is located on the following floor levels of residence (select all that apply):: 2nd Floor  Upon entering residence, is there a bathroom on the main floor (no further steps)?: No  Indicate which floors of current residence have a bathroom (select all the apply):: 2nd Floor  Number of steps to 2nd floor from main floor: One Flight  Living Arrangements: Lives Alone  Is patient a ?: No    Activities of Daily Living Prior to Admission  Functional Status: Independent  Completes ADLs independently?: Yes  Ambulates independently?: Yes  Does patient use assisted devices?: No  Does patient currently own DME?: No  Does patient have a history of Outpatient Therapy (PT/OT)?: No  Does the patient have a history of Short-Term Rehab?: No  Does patient have a history of HHC?: Yes  Does patient currently have HHC?: No         Patient Information Continued  Income Source: SSI/SSD  Does patient have prescription coverage?: Yes  Does patient receive dialysis treatments?: No  Does patient have a history of substance abuse?: No  Does patient have a history of Mental Health Diagnosis?: No         Means of Transportation  Means of Transport to Appts:: Family transport          DISCHARGE DETAILS:    Discharge planning discussed with:: Juliana-daughter  Freedom of Choice: Yes  Comments - Freedom of Choice: Juliana provided the information to assess with the completion of this assessment. Juliana reported the Pt will need a commode upon d/c.  EROS contacted family/caregiver?: Yes             Contacts  Patient Contacts: Juliana  Relationship to Patient:: Family  Contact Method: Phone  Phone Number: [phone]  Reason/Outcome: Discharge Planning, Continuity of Care         DME Referral Provided  Referral made for DME?: Yes  DME referral completed for the following items:: Bedside Commode  DME Supplier Name:: [employer]    Other Referral/Resources/Interventions Provided:  Interventions: DME (TBD)         Treatment  Team Recommendation:  (TBD)  Discharge Destination Plan::  (TBD)

## 2025-01-24 NOTE — ASSESSMENT & PLAN NOTE
Diagnosed on last admission with patient's progressive respiratory symptoms despite antibiotics however course also complicated by possibility of progressing cancer as well as immunotherapy induced pneumonitis.  Patient completed 21-day course of empiric therapy.  She remains on at least 4 mg of Decadron a day.  Continue on Bactrim prophylaxis  Prophylaxis dosing adjusted for 3 times weekly  Continue antibiotics otherwise as above  Monitor respiratory status  Trend fever curve/vitals

## 2025-01-24 NOTE — ASSESSMENT & PLAN NOTE
Follows with palliative care  Current regimen:  Gabapentin 100mg AM, 100mg at noon, 300mg Bedtime  Tylenol 650mg PRN  Heating pad during the day  Zofran for nausea  Failed therapies:  Celebrex 200mg BID  Oxycodone- felt like she had a headache from the medication  Would like to avoid opiates  Bowel regimen to prevent OIC:  Senna

## 2025-01-24 NOTE — ASSESSMENT & PLAN NOTE
3-day history of nonradiating low back pain  CT abdomen pelvis show bilateral perinephric stranding, with right urothelial thickening, concerning for urinary tract infection   One dose of cefepime given in ED  Continue with broad-spectrum antibiotics, trend cultures

## 2025-01-24 NOTE — H&P
H&P - Hospitalist   Name: Ayla Nye 74 y.o. female I MRN: [id number]  Unit/Bed#: ED-41 I Date of Admission: 1/23/2025   Date of Service: 1/24/2025 I Hospital Day: 0     Assessment & Plan  Sepsis (HCC)  3-day history of nonradiating low back pain, fever and chills.  Recent treatment for chemotherapy 1/23/2025.  CT abdomen pelvis show bilateral perinephric stranding, with right urothelial thickening, concerning for urinary tract infection   Severe sepsis criteria with oral temperature 101-102 °F, initial tachycardia 101, leukocytosis 2.36, bandemia 9%, calcitonin 0.35, lactic acidosis 2.3   Etiology of sepsis unclear, bacteremia vs carboplatin induced fever vs UTI vs pneumonitis.    Upon recent admission, patient was diagnosed with pneumocystis jiroveci  One dose of cefepime given in ED,     Plan  Follow-up blood cultures  Continue with cefepime, added vancomycin for broader coverage for presumable bacteremia with unidentified source.  Consider PCP PCR if blood cultures come back negative  Consult ID  Malignant neoplasm of upper lobe, right bronchus or lung (HCC)  Follows with Dr. Castro- medical oncology  Completed cycle 2 chemo on 11/14.   Infusion 1/23- carboplatin and pemetrexed, will stop carboplatin  Will start on low dose Avastin.  SBRT treatment on 1/16  Next hem/onc appt 1/27  MRI ordered for 3/27.  Cancer associated pain  Follows with palliative care  Current regimen:  Gabapentin 100mg AM, 100mg at noon, 300mg Bedtime  Tylenol 650mg PRN  Heating pad during the day  Zofran for nausea  Failed therapies:  Celebrex 200mg BID  Oxycodone- felt like she had a headache from the medication  Would like to avoid opiates  Bowel regimen to prevent OIC:  Senna    History of pulmonary embolism  Dx during Nov/Dec admission   in the setting of malignancy  Continue Xarelto 20 mg QD  Acute cystitis without hematuria (Resolved: 1/24/2025)  3-day history of nonradiating low back pain  CT abdomen pelvis show bilateral  perinephric stranding, with right urothelial thickening, concerning for urinary tract infection   One dose of cefepime given in ED  Continue with broad-spectrum antibiotics, trend cultures      VTE Pharmacologic Prophylaxis: VTE Score: 11 High Risk (Score >/= 5) - Pharmacological DVT Prophylaxis Ordered: rivaroxaban (Xarelto). Sequential Compression Devices Ordered.  Code Status: Level 1 - Full Code   Discussion with family: Patient declined call to .     Anticipated Length of Stay: Patient will be admitted on an inpatient basis with an anticipated length of stay of greater than 2 midnights secondary to fever of unknown origin.    History of Present Illness   Chief Complaint: Fevers and chills    Ayla Nye is a 74 y.o. female history of lung cancer with metastasis to brain, hyperlipidemia, hypertension, hx of pneumocystis jirovecii presented with 3-day history of fevers of one 101.2 and chills.  Patient endorsed having vague, nonradiating lower back pain for the past 3 days as well as some shortness of breath.  On 1/23/2025, patient was treated for chemotherapy with carboplatin and pemetrexed.  She had some nausea that helped with Zofran.  Following completion of chemotherapy, patient started experiencing fevers between 101 and 102 as well as chills.  She denies any chest or abdominal pain, as well as no vomiting/diarrhea/dysuria.    In the ED, she met sepsis criteria with fevers of 101.2, tachycardia, leukocytosis/bandemia.  Lactic acidosis 2.3, procalcitonin 8.35.  BMP unremarkable, viral panel negative, CT showed urothelial thickening, bilateral perinephric stranding concerning for possible UTI.  Please see CT results for other findings.  Patient was given 2 L of IV normal saline, IV Tylenol and 1 dose of cefepime.  Patient vitally stable and saturating well on 2 L of oxygen.    Review of Systems   Constitutional:  Positive for chills and fever.   HENT:  Negative for ear pain and sore  throat.    Eyes:  Negative for pain and visual disturbance.   Respiratory:  Positive for shortness of breath. Negative for cough.    Cardiovascular:  Negative for chest pain and palpitations.   Gastrointestinal:  Positive for nausea. Negative for abdominal pain, constipation, diarrhea and vomiting.   Genitourinary:  Negative for dysuria, hematuria, pelvic pain, urgency, vaginal bleeding and vaginal pain.   Musculoskeletal:  Positive for back pain. Negative for arthralgias.   Skin:  Negative for color change and rash.   Neurological:  Negative for seizures and syncope.   All other systems reviewed and are negative.      Historical Information   Past Medical History:   Diagnosis Date    Allergic 2022    Allergic to neomiacin and cephalexin    Cancer (HCC)     lung cancer    Cancer (HCC)     mets to brain    Cancer (HCC)     perianal mass    Cataract Nov. 2023    Due to have durgery June 2024    Essential thrombocytosis (HCC)     controlled with medication    History of transfusion     Hyperlipidemia     Hypertension     Mass in chest     Nodular goiter     Osteoporosis     Peripheral neuropathy     Pneumonia Oct 16, 2023    Also  Feb 2024    Psoriasis     SIRS (systemic inflammatory response syndrome) (HCC) 06/22/2024     Past Surgical History:   Procedure Laterality Date    APPENDECTOMY      BREAST CYST EXCISION Right 2009    COLONOSCOPY  10/31/2018    DXA PROCEDURE (HISTORICAL)  04/21/2017    IR BIOPSY OTHER  9/12/2024    IR LUMBAR PUNCTURE  9/12/2024    MAMMO (HISTORICAL)      8-24-18    MAMMO NEEDLE LOCALIZATION RIGHT (ALL INC) Right 07/13/2009    SKIN LESION EXCISION      bridge of nose     Social History     Tobacco Use    Smoking status: Former     Current packs/day: 1.00     Average packs/day: 1 pack/day for 59.1 years (59.1 ttl pk-yrs)     Types: Cigarettes     Start date: 1966     Passive exposure: Past    Smokeless tobacco: Never    Tobacco comments:     Quit 2010   Vaping Use    Vaping status: Never Used    Substance and Sexual Activity    Alcohol use: Yes     Alcohol/week: 5.0 standard drinks of alcohol     Types: 5 Glasses of wine per week    Drug use: Never    Sexual activity: Not Currently     Partners: Male     Birth control/protection: Post-menopausal     E-Cigarette/Vaping    E-Cigarette Use Never User      E-Cigarette/Vaping Substances    Nicotine No     THC No     CBD No     Flavoring No     Other No     Unknown No      Family history non-contributory  Social History:  Marital Status:    Occupation: Retired  Patient Pre-hospital Living Situation: Home  Patient Pre-hospital Level of Mobility: walks  Patient Pre-hospital Diet Restrictions: None    Meds/Allergies   I have reviewed home medications with patient personally.  Prior to Admission medications    Medication Sig Start Date End Date Taking? Authorizing Provider   amLODIPine (NORVASC) 5 mg tablet TAKE 1 TABLET(5 MG) BY MOUTH DAILY 1/20/25  Yes Rafy Angelo MD   dexamethasone (DECADRON) 4 mg tablet Take 1 tablet (4 mg total) by mouth every 8 (eight) hours for 5 days, THEN 1 tablet (4 mg total) every 12 (twelve) hours for 7 days, THEN 1.5 tablets (6 mg total) in the morning for 7 days, THEN 0.5 tablets (2 mg total) 2 (two) times a day for 7 days, THEN 0.5 tablets (2 mg total) in the morning for 7 days. When you get to the 6 mg dosage please take 4 mg in the morning followed by 2 mg in the evening; that would be 1 tablet in the morning followed by 1/2 tablet in the evening for that week.. 1/2/25 2/4/25 Yes Jelena Gamble MD   folic acid (FOLVITE) 1 mg tablet Take 1 tablet (1 mg total) by mouth daily 11/22/24  Yes Rafy Angelo MD   gabapentin (NEURONTIN) 100 mg capsule TAKE 1 CAPSULE BY MOUTH EVERY MORNING, 1 CAPSULE EVERY AFTERNOON AND 3 CAPSULES EVERY NIGHT AT BEDTIME 1/13/25  Yes ZULEYMA Pollard   levETIRAcetam (KEPPRA) 1000 MG tablet TAKE 1 TABLET(1000 MG) BY MOUTH EVERY 12 HOURS 1/14/25  Yes Rafy Angelo MD    levothyroxine 50 mcg tablet TAKE 1 TABLET(50 MCG) BY MOUTH DAILY EARLY MORNING 1/14/25  Yes Rafy Angelo MD   rivaroxaban (Xarelto) 20 mg tablet Take 1 tablet (20 mg total) by mouth daily with breakfast 1/13/25  Yes Rafy Angelo MD   senna (SENOKOT) 8.6 MG tablet Take 1 tablet by mouth daily Takes one every other day  Patient taking differently: Take 2 tablets by mouth daily Takes two every other day   Yes Historical Provider, MD   sulfamethoxazole-trimethoprim (BACTRIM DS) 800-160 mg per tablet Take 1 tablet by mouth daily Starting 12/24, you can take one tablet Monday, Wednesday, and Fridays. Do not start before December 24, 2024. 12/24/24 6/22/25 Yes Rosalinda Castro MD   vitamin B-12 (VITAMIN B-12) 1,000 mcg tablet Take 1 tablet (1,000 mcg total) by mouth daily 9/14/23  Yes ZULEYMA Boland   pantoprazole (PROTONIX) 40 mg tablet Take 1 tablet (40 mg total) by mouth daily in the early morning 12/12/24 1/23/25  Rafy Angelo MD   Probiotic Product (PROBIOTIC DAILY PO) Take 1 capsule by mouth daily ON HOLD  Patient not taking: Reported on 1/23/2025    Historical Provider, MD     Allergies   Allergen Reactions    Doxycycline Headache    Keflex [Cephalexin] Rash    Neomycin-Polymyxin-Dexameth Eye Swelling       Objective :  Temp:  [97.4 °F (36.3 °C)-101.2 °F (38.4 °C)] 98.5 °F (36.9 °C)  HR:  [] 81  BP: (113-148)/(56-82) 120/63  Resp:  [16-20] 20  SpO2:  [89 %-97 %] 95 %  O2 Device: Nasal cannula  Nasal Cannula O2 Flow Rate (L/min):  [3 L/min] 3 L/min    Physical Exam  Vitals and nursing note reviewed.   Constitutional:       General: She is not in acute distress.     Appearance: She is well-developed. She is ill-appearing.   HENT:      Head: Normocephalic and atraumatic.   Eyes:      Conjunctiva/sclera: Conjunctivae normal.   Cardiovascular:      Rate and Rhythm: Normal rate and regular rhythm.      Heart sounds: No murmur heard.  Pulmonary:      Effort: Pulmonary effort is normal. No respiratory  distress.      Breath sounds: Normal breath sounds.   Abdominal:      Palpations: Abdomen is soft.      Tenderness: There is no abdominal tenderness.   Musculoskeletal:         General: No swelling.      Cervical back: Neck supple.   Skin:     General: Skin is warm and dry.      Capillary Refill: Capillary refill takes less than 2 seconds.   Neurological:      Mental Status: She is alert.   Psychiatric:         Mood and Affect: Mood normal.            Lab Results: I have reviewed the following results:  Results from last 7 days   Lab Units 01/23/25  2145   WBC Thousand/uL 2.36*   HEMOGLOBIN g/dL 10.1*   HEMATOCRIT % 33.5*   PLATELETS Thousands/uL 185   BANDS PCT % 9*   LYMPHO PCT % 0*   MONO PCT % 1*   EOS PCT % 0     Results from last 7 days   Lab Units 01/23/25  2145   SODIUM mmol/L 141   POTASSIUM mmol/L 3.7   CHLORIDE mmol/L 107   CO2 mmol/L 23   BUN mg/dL 21   CREATININE mg/dL 0.98   ANION GAP mmol/L 11   CALCIUM mg/dL 8.8   ALBUMIN g/dL 3.7   TOTAL BILIRUBIN mg/dL 0.28   ALK PHOS U/L 52   ALT U/L 15   AST U/L 19   GLUCOSE RANDOM mg/dL 124     Results from last 7 days   Lab Units 01/23/25  2145   INR  1.24*         Lab Results   Component Value Date    HGBA1C 5.3 07/30/2024     Results from last 7 days   Lab Units 01/24/25  0009 01/23/25  2145   LACTIC ACID mmol/L 1.9 2.3*   PROCALCITONIN ng/ml  --  8.35*       Imaging Results Review: I reviewed radiology reports from this admission including: CT chest and CT abdomen/pelvis.    Administrative Statements   I have spent a total time of 45 minutes in caring for this patient on the day of the visit/encounter including Diagnostic results, Prognosis, Risks and benefits of tx options, Instructions for management, Patient and family education, Importance of tx compliance, Risk factor reductions, Impressions, Counseling / Coordination of care, Documenting in the medical record, Reviewing / ordering tests, medicine, procedures  , Obtaining or reviewing history  , and  Communicating with other healthcare professionals .    ** Please Note: This note has been constructed using a voice recognition system. **

## 2025-01-24 NOTE — PROGRESS NOTES
Ayla Nye is a 74 y.o. female who is currently ordered Vancomycin IV with management by the Pharmacy Consult service.  Relevant clinical data and objective / subjective history reviewed.  Vancomycin Assessment:  Indication and Goal AUC/Trough: Pneumonia (goal -600, trough >10)  Clinical Status:  new  Micro:   MRSA pending  Renal Function:  SCr: 0.86 mg/dL  CrCl: 39.7 mL/min  Renal replacement: Not on dialysis  Days of Therapy: 1  Current Dose: 1,500mg IV once as loading dose  Vancomycin Plan:  New Dosin,000mg IV q24h  Estimated AUC: 477 mcg*hr/mL  Estimated Trough: 12.1 mcg/mL  Next Level:  0600  Renal Function Monitoring: Daily BMP and UOP  Pharmacy will continue to follow closely for s/sx of nephrotoxicity, infusion reactions and appropriateness of therapy.  BMP and CBC will be ordered per protocol. We will continue to follow the patient’s culture results and clinical progress daily.    Lindy Onofre, Pharmacist

## 2025-01-25 LAB
ALBUMIN SERPL BCG-MCNC: 3.1 G/DL (ref 3.5–5)
ALP SERPL-CCNC: 44 U/L (ref 34–104)
ALT SERPL W P-5'-P-CCNC: 15 U/L (ref 7–52)
ANION GAP SERPL CALCULATED.3IONS-SCNC: 8 MMOL/L (ref 4–13)
AST SERPL W P-5'-P-CCNC: 15 U/L (ref 13–39)
ATRIAL RATE: 103 BPM
BILIRUB SERPL-MCNC: 0.31 MG/DL (ref 0.2–1)
BUN SERPL-MCNC: 16 MG/DL (ref 5–25)
CALCIUM ALBUM COR SERPL-MCNC: 8.9 MG/DL (ref 8.3–10.1)
CALCIUM SERPL-MCNC: 8.2 MG/DL (ref 8.4–10.2)
CHLORIDE SERPL-SCNC: 104 MMOL/L (ref 96–108)
CO2 SERPL-SCNC: 23 MMOL/L (ref 21–32)
CREAT SERPL-MCNC: 0.94 MG/DL (ref 0.6–1.3)
ERYTHROCYTE [DISTWIDTH] IN BLOOD BY AUTOMATED COUNT: 15.9 % (ref 11.6–15.1)
GFR SERPL CREATININE-BSD FRML MDRD: 59 ML/MIN/1.73SQ M
GLUCOSE SERPL-MCNC: 123 MG/DL (ref 65–140)
HCT VFR BLD AUTO: 30.5 % (ref 34.8–46.1)
HGB BLD-MCNC: 9.7 G/DL (ref 11.5–15.4)
MCH RBC QN AUTO: 30.3 PG (ref 26.8–34.3)
MCHC RBC AUTO-ENTMCNC: 31.8 G/DL (ref 31.4–37.4)
MCV RBC AUTO: 95 FL (ref 82–98)
MRSA NOSE QL CULT: NORMAL
NRBC BLD AUTO-RTO: 0 /100 WBCS
P AXIS: 51 DEGREES
PLATELET # BLD AUTO: 141 THOUSANDS/UL (ref 149–390)
PMV BLD AUTO: 9.5 FL (ref 8.9–12.7)
POTASSIUM SERPL-SCNC: 4.4 MMOL/L (ref 3.5–5.3)
PR INTERVAL: 140 MS
PROCALCITONIN SERPL-MCNC: 13.09 NG/ML
PROT SERPL-MCNC: 5.5 G/DL (ref 6.4–8.4)
QRS AXIS: 42 DEGREES
QRSD INTERVAL: 66 MS
QT INTERVAL: 318 MS
QTC INTERVAL: 416 MS
RBC # BLD AUTO: 3.2 MILLION/UL (ref 3.81–5.12)
SODIUM SERPL-SCNC: 135 MMOL/L (ref 135–147)
T WAVE AXIS: 13 DEGREES
VENTRICULAR RATE: 103 BPM
WBC # BLD AUTO: 6.02 THOUSAND/UL (ref 4.31–10.16)

## 2025-01-25 PROCEDURE — 80053 COMPREHEN METABOLIC PANEL: CPT | Performed by: STUDENT IN AN ORGANIZED HEALTH CARE EDUCATION/TRAINING PROGRAM

## 2025-01-25 PROCEDURE — 97163 PT EVAL HIGH COMPLEX 45 MIN: CPT

## 2025-01-25 PROCEDURE — 85027 COMPLETE CBC AUTOMATED: CPT | Performed by: STUDENT IN AN ORGANIZED HEALTH CARE EDUCATION/TRAINING PROGRAM

## 2025-01-25 PROCEDURE — 84145 PROCALCITONIN (PCT): CPT | Performed by: STUDENT IN AN ORGANIZED HEALTH CARE EDUCATION/TRAINING PROGRAM

## 2025-01-25 PROCEDURE — 93010 ELECTROCARDIOGRAM REPORT: CPT | Performed by: STUDENT IN AN ORGANIZED HEALTH CARE EDUCATION/TRAINING PROGRAM

## 2025-01-25 RX ADMIN — RIVAROXABAN 20 MG: 20 TABLET, FILM COATED ORAL at 07:37

## 2025-01-25 RX ADMIN — DEXAMETHASONE 2 MG: 2 TABLET ORAL at 20:24

## 2025-01-25 RX ADMIN — CEFTRIAXONE SODIUM 2000 MG: 10 INJECTION, POWDER, FOR SOLUTION INTRAVENOUS at 10:34

## 2025-01-25 RX ADMIN — VANCOMYCIN HYDROCHLORIDE 1000 MG: 5 INJECTION, POWDER, LYOPHILIZED, FOR SOLUTION INTRAVENOUS at 16:28

## 2025-01-25 RX ADMIN — GABAPENTIN 100 MG: 100 CAPSULE ORAL at 09:07

## 2025-01-25 RX ADMIN — GABAPENTIN 200 MG: 100 CAPSULE ORAL at 20:23

## 2025-01-25 RX ADMIN — LEVETIRACETAM 1000 MG: 500 TABLET, FILM COATED ORAL at 22:48

## 2025-01-25 RX ADMIN — LEVETIRACETAM 1000 MG: 500 TABLET, FILM COATED ORAL at 10:33

## 2025-01-25 RX ADMIN — SODIUM CHLORIDE, SODIUM GLUCONATE, SODIUM ACETATE, POTASSIUM CHLORIDE, MAGNESIUM CHLORIDE, SODIUM PHOSPHATE, DIBASIC, AND POTASSIUM PHOSPHATE 75 ML/HR: .53; .5; .37; .037; .03; .012; .00082 INJECTION, SOLUTION INTRAVENOUS at 05:57

## 2025-01-25 RX ADMIN — ACETAMINOPHEN 975 MG: 325 TABLET, FILM COATED ORAL at 09:09

## 2025-01-25 RX ADMIN — AMLODIPINE BESYLATE 5 MG: 5 TABLET ORAL at 09:07

## 2025-01-25 RX ADMIN — FOLIC ACID 1 MG: 1 TABLET ORAL at 09:07

## 2025-01-25 RX ADMIN — GABAPENTIN 100 MG: 100 CAPSULE ORAL at 20:24

## 2025-01-25 RX ADMIN — ACETAMINOPHEN 975 MG: 325 TABLET, FILM COATED ORAL at 20:23

## 2025-01-25 RX ADMIN — PANTOPRAZOLE SODIUM 40 MG: 40 TABLET, DELAYED RELEASE ORAL at 05:57

## 2025-01-25 RX ADMIN — LEVOTHYROXINE SODIUM 50 MCG: 0.05 TABLET ORAL at 05:57

## 2025-01-25 RX ADMIN — DEXAMETHASONE 2 MG: 2 TABLET ORAL at 09:06

## 2025-01-25 RX ADMIN — CYANOCOBALAMIN TAB 500 MCG 1000 MCG: 500 TAB at 09:07

## 2025-01-25 RX ADMIN — SENNOSIDES 17.2 MG: 8.6 TABLET, FILM COATED ORAL at 09:06

## 2025-01-25 RX ADMIN — GABAPENTIN 100 MG: 100 CAPSULE ORAL at 16:28

## 2025-01-25 NOTE — NURSING NOTE
"Pt refused application of SCD pumps on legs. Pt educated on the importance of them. Pt says she \"takes Xarelto so she doesn't think she needs them\". Nurse notified.   "

## 2025-01-25 NOTE — PROGRESS NOTES
Ayla Nye is a 74 y.o. female who is currently ordered Vancomycin IV with management by the Pharmacy Consult service.  Relevant clinical data and objective / subjective history reviewed.  Vancomycin Assessment:  Indication and Goal AUC/Trough: Pneumonia (goal -600, trough >10)  Clinical Status: stable  Micro:     Renal Function:  SCr: 0.94 mg/dL  CrCl: 36.5 mL/min  Renal replacement: Not on dialysis  Days of Therapy: 2  Current Dose: 1000 mg every 24 hours  Vancomycin Plan:  New Dosing: continue current dose  Estimated AUC: 532 mcg*hr/mL  Estimated Trough: 14.1 mcg/mL  Next Level: 1/26 @ 0600  Renal Function Monitoring: Daily BMP and UOP  Pharmacy will continue to follow closely for s/sx of nephrotoxicity, infusion reactions and appropriateness of therapy.  BMP and CBC will be ordered per protocol. We will continue to follow the patient’s culture results and clinical progress daily.    Rosa Andujar, Pharmacist

## 2025-01-25 NOTE — PROGRESS NOTES
Progress Note - Hospitalist   Name: Ayla Nye 74 y.o. female I MRN: [id number]  Unit/Bed#: S -01 I Date of Admission: 1/23/2025   Date of Service: 1/25/2025 I Hospital Day: 1    Assessment & Plan  SIRS (systemic inflammatory response syndrome) (HCC)  3-day history of nonradiating low back pain, fever and chills.  Recent treatment for chemotherapy 1/23/2025.  CT abdomen pelvis show bilateral perinephric stranding, with right urothelial thickening, concerning for urinary tract infection   Severe sepsis criteria with oral temperature 101-102 °F, initial tachycardia 101, leukocytosis 2.36, bandemia 9%, calcitonin 0.35, lactic acidosis 2.3   Etiology of sepsis unclear, bacteremia vs carboplatin induced fever vs UTI vs pneumonitis.    Upon recent admission, patient was diagnosed with pneumocystis jiroveci  One dose of cefepime given in ED, now on Rocephin  CTA PE 1/24: 1. No evidence of acute pulmonary embolism. Stable tiny chronic embolus in a left lower lobe segmental artery. 2. Small bilateral pleural effusions. Otherwise no significant interval change in exam when compared to CT chest, abdomen, and pelvis examination from day prior.  Blood cultures negative at 24 hours    Plan  Follow-up blood cultures  Switched from cefepime to Ceftriaxone 1/24, added vancomycin for broader coverage for presumable bacteremia with unidentified source.  Consider PCP PCR if blood cultures come back negative  Consult ID  Metastatic adenocarcinoma (HCC)  Follows with Dr. Castro- medical oncology  Completed cycle 2 chemo on 11/14.   Infusion 1/23- carboplatin and pemetrexed, will stop carboplatin  Will start on low dose Avastin.  SBRT treatment on 1/16  Next hem/onc appt 1/27  MRI ordered for 3/27.  Cancer associated pain  Follows with palliative care  Current regimen:  Gabapentin 100mg AM, 100mg at noon, 300mg Bedtime  Tylenol 650mg PRN  Heating pad during the day  Zofran for nausea  Failed therapies:  Celebrex 200mg  BID  Oxycodone- felt like she had a headache from the medication  Would like to avoid opiates  Bowel regimen to prevent OIC:  Senna    History of pulmonary embolism  Dx during Nov/Dec admission   in the setting of malignancy  Continue Xarelto 20 mg QD  History of Pneumocystis jirovecii pneumonia  Diagnosed on last admission with patient's progressive respiratory symptoms despite antibiotics however course also complicated by possibility of progressing cancer as well as immunotherapy induced pneumonitis. Patient completed 21-day course of empiric therapy. She remains on at least 4 mg of Decadron a day.  Continue on Bactrim prophylaxis  Prophylaxis dosing adjusted for 3 times weekly  Continue antibiotics otherwise as above  Monitor respiratory status  Trend fever curve/vitals    VTE Pharmacologic Prophylaxis: VTE Score: 11 High Risk (Score >/= 5) - Pharmacological DVT Prophylaxis Ordered: rivaroxaban (Xarelto). Sequential Compression Devices Ordered.    Mobility:   Basic Mobility Inpatient Raw Score: 21  JH-HLM Goal: 6: Walk 10 steps or more  JH-HLM Achieved: 8: Walk 250 feet ot more  JH-HLM Goal achieved. Continue to encourage appropriate mobility.    Patient Centered Rounds: I performed bedside rounds with nursing staff today.   Discussions with Specialists or Other Care Team Provider: ID, nursing, attending, case management    Education and Discussions with Family / Patient: Updated  (daughter) via phone.    Current Length of Stay: 1 day(s)  Current Patient Status: Inpatient   Certification Statement: The patient will continue to require additional inpatient hospital stay due to SIRs  Discharge Plan: Anticipate discharge in 24-48 hrs to discharge location to be determined pending rehab evaluations.    Code Status: Level 1 - Full Code    Subjective   Patient seen at bedside this morning. She feels a lot better and more energetic compared to yesterday.     Objective :  Temp:  [98.4 °F (36.9 °C)-102.2 °F  (39 °C)] 98.5 °F (36.9 °C)  HR:  [66-98] 78  BP: (119-162)/(64-84) 144/84  Resp:  [16-18] 16  SpO2:  [94 %-97 %] 96 %  O2 Device: Nasal cannula  Nasal Cannula O2 Flow Rate (L/min):  [2 L/min-3 L/min] 3 L/min    There is no height or weight on file to calculate BMI.     Input and Output Summary (last 24 hours):     Intake/Output Summary (Last 24 hours) at 1/25/2025 0616  Last data filed at 1/25/2025 0605  Gross per 24 hour   Intake 3023.75 ml   Output 850 ml   Net 2173.75 ml       Physical Exam  Constitutional:       Appearance: Normal appearance.   HENT:      Head: Normocephalic and atraumatic.      Right Ear: External ear normal.      Left Ear: External ear normal.      Nose: Nose normal.      Comments: Nasal cannula in place     Mouth/Throat:      Mouth: Mucous membranes are moist.      Pharynx: Oropharynx is clear.   Cardiovascular:      Rate and Rhythm: Normal rate and regular rhythm.      Pulses: Normal pulses.      Heart sounds: Normal heart sounds.   Pulmonary:      Effort: Pulmonary effort is normal.      Breath sounds: Normal breath sounds.      Comments: Patient at 3 L nasal cannula saturating in the 90s  Abdominal:      General: Abdomen is flat. Bowel sounds are normal.      Palpations: Abdomen is soft.   Musculoskeletal:         General: No swelling.   Skin:     General: Skin is warm and dry.      Capillary Refill: Capillary refill takes less than 2 seconds.   Neurological:      General: No focal deficit present.      Mental Status: She is alert and oriented to person, place, and time.   Psychiatric:         Mood and Affect: Mood normal.         Behavior: Behavior normal.         Lab Results: I have reviewed the following results:   Results from last 7 days   Lab Units 01/25/25  0401 01/24/25  0313 01/23/25  2145   WBC Thousand/uL 6.02   < > 2.36*   HEMOGLOBIN g/dL 9.7*   < > 10.1*   HEMATOCRIT % 30.5*   < > 33.5*   PLATELETS Thousands/uL 141*   < > 185   BANDS PCT %  --   --  9*   LYMPHO PCT %  --   --   0*   MONO PCT %  --   --  1*   EOS PCT %  --   --  0    < > = values in this interval not displayed.     Results from last 7 days   Lab Units 01/25/25  0401   SODIUM mmol/L 135   POTASSIUM mmol/L 4.4   CHLORIDE mmol/L 104   CO2 mmol/L 23   BUN mg/dL 16   CREATININE mg/dL 0.94   ANION GAP mmol/L 8   CALCIUM mg/dL 8.2*   ALBUMIN g/dL 3.1*   TOTAL BILIRUBIN mg/dL 0.31   ALK PHOS U/L 44   ALT U/L 15   AST U/L 15   GLUCOSE RANDOM mg/dL 123     Results from last 7 days   Lab Units 01/23/25  2145   INR  1.24*             Results from last 7 days   Lab Units 01/25/25  0401 01/24/25  0544 01/24/25  0313 01/24/25  0009 01/23/25  2145   LACTIC ACID mmol/L  --   --  2.0 1.9 2.3*   PROCALCITONIN ng/ml 13.09* 29.04*  --   --  8.35*       Recent Cultures (last 7 days):   Results from last 7 days   Lab Units 01/24/25  0946 01/23/25  2145   BLOOD CULTURE   --  Received in Microbiology Lab. Culture in Progress.  Received in Microbiology Lab. Culture in Progress.   LEGIONELLA URINARY ANTIGEN  Negative  --        Imaging Results Review: I reviewed radiology reports from this admission including: CT chest.  Other Study Results Review: No additional pertinent studies reviewed.    Last 24 Hours Medication List:     Current Facility-Administered Medications:     acetaminophen (TYLENOL) tablet 975 mg, Q6H PRN    amLODIPine (NORVASC) tablet 5 mg, Daily    ceftriaxone (ROCEPHIN) 2 g/50 mL in dextrose IVPB, Q24H, Last Rate: Stopped (01/24/25 1123)    cyanocobalamin (VITAMIN B-12) tablet 1,000 mcg, Daily    dexamethasone (DECADRON) tablet 2 mg, BID **FOLLOWED BY** [START ON 1/29/2025] dexamethasone (DECADRON) tablet 2 mg, Daily    folic acid (FOLVITE) tablet 1 mg, Daily    gabapentin (NEURONTIN) capsule 100 mg, TID    gabapentin (NEURONTIN) capsule 100 mg, Once    gabapentin (NEURONTIN) capsule 200 mg, HS    levETIRAcetam (KEPPRA) tablet 1,000 mg, Q12H    levothyroxine tablet 50 mcg, Early Morning    multi-electrolyte (PLASMALYTE-A/ISOLYTE-S  PH 7.4) IV solution, Continuous, Last Rate: 75 mL/hr (01/25/25 0557)    pantoprazole (PROTONIX) EC tablet 40 mg, Early Morning    rivaroxaban (XARELTO) tablet 20 mg, Daily With Breakfast    senna (SENOKOT) tablet 17.2 mg, Daily    [START ON 1/27/2025] sulfamethoxazole-trimethoprim (BACTRIM DS) 800-160 mg per tablet 1 tablet, Once per day on Monday Wednesday Friday    vancomycin (VANCOCIN) 1000 mg in sodium chloride 0.9% 250 mL IVPB, Q24H    Facility-Administered Medications Ordered in Other Encounters:     [MAR Hold] alteplase (CATHFLO) injection 2 mg, Q1MIN PRN    Administrative Statements   Today, Patient Was Seen By: Fabby Monzon,   I have spent a total time of 60 minutes in caring for this patient on the day of the visit/encounter including Diagnostic results, Prognosis, Risks and benefits of tx options, Instructions for management, Patient and family education, Importance of tx compliance, Risk factor reductions, Impressions, Counseling / Coordination of care, Documenting in the medical record, Reviewing / ordering tests, medicine, procedures  , Obtaining or reviewing history  , and Communicating with other healthcare professionals .    **Please Note: This note may have been constructed using a voice recognition system.**

## 2025-01-25 NOTE — ASSESSMENT & PLAN NOTE
3-day history of nonradiating low back pain, fever and chills.  Recent treatment for chemotherapy 1/23/2025.  CT abdomen pelvis show bilateral perinephric stranding, with right urothelial thickening, concerning for urinary tract infection   Severe sepsis criteria with oral temperature 101-102 °F, initial tachycardia 101, leukocytosis 2.36, bandemia 9%, calcitonin 0.35, lactic acidosis 2.3   Etiology of sepsis unclear, bacteremia vs carboplatin induced fever vs UTI vs pneumonitis.    Upon recent admission, patient was diagnosed with pneumocystis jiroveci  One dose of cefepime given in ED, now on Rocephin  CTA PE 1/24: 1. No evidence of acute pulmonary embolism. Stable tiny chronic embolus in a left lower lobe segmental artery. 2. Small bilateral pleural effusions. Otherwise no significant interval change in exam when compared to CT chest, abdomen, and pelvis examination from day prior.  Blood cultures negative at 24 hours    Plan  Follow-up blood cultures  Switched from cefepime to Ceftriaxone 1/24, added vancomycin for broader coverage for presumable bacteremia with unidentified source.  Consider PCP PCR if blood cultures come back negative  Consult ID

## 2025-01-25 NOTE — PHYSICAL THERAPY NOTE
PHYSICAL THERAPY EVALUATION  NAME: Ayla Nye  AGE:   74 y.o.  MRN:  [id number]  ADMIT DX: Abdominal mass [R19.00]  Leukopenia [D72.819]  Lung cancer (HCC) [C34.90]  Lung nodules [R91.8]  Bandemia [D72.825]  Fever [R50.9]  Right kidney mass [N28.89]  Severe sepsis (HCC) [A41.9, R65.20]  Mass of upper lobe of right lung [R91.8]    PMH:   Past Medical History:   Diagnosis Date    Allergic 2022    Allergic to neomiacin and cephalexin    Cancer (HCC)     lung cancer    Cancer (HCC)     mets to brain    Cancer (HCC)     perianal mass    Cataract Nov. 2023    Due to have durgery June 2024    Essential thrombocytosis (HCC)     controlled with medication    History of transfusion     Hyperlipidemia     Hypertension     Mass in chest     Nodular goiter     Osteoporosis     Peripheral neuropathy     Pneumonia Oct 16, 2023    Also  Feb 2024    Psoriasis     SIRS (systemic inflammatory response syndrome) (HCC) 06/22/2024     LENGTH OF STAY: 1        01/25/25 1131   PT Last Visit   PT Visit Date 01/25/25   Note Type   Note type Evaluation   Pain Assessment   Pain Assessment Tool 0-10   Pain Score 5   Pain Location/Orientation Location: Head   Hospital Pain Intervention(s) Repositioned;Ambulation/increased activity   Restrictions/Precautions   Weight Bearing Precautions Per Order No   Other Precautions Bed Alarm;O2;Fall Risk;Pain  (3 L O2 NC)   Home Living   Type of Home House   Home Layout Two level;Stairs to enter with rails  (6 HARRY)   Bathroom Shower/Tub Tub/shower unit   Bathroom Toilet Standard   Bathroom Equipment Grab bars in shower   Home Equipment Cane   Additional Comments Ambulates independently without AD at baseline.   Prior Function   Level of [place] Independent with ADLs;Independent with functional mobility  (neighbors or ex- drives; advocate assists with medication)   Lives With Alone  (pt advocate comes 5 hours per day to assist as needed)   Receives Help From Neighbor;Friend(s)  (pt  advocate)   IADLs Family/Friend/Other provides transportation;Family/Friend/Other provides medication management;Independent with meal prep   Falls in the last 6 months 0   General   Family/Caregiver Present No   Cognition   Overall Cognitive Status WFL   Arousal/Participation Alert   Orientation Level Oriented X4   Memory Within functional limits   Following Commands Follows all commands and directions without difficulty   Comments Pt identified by name and .   Subjective   Subjective Agrees to PT evaluation initially, however becoming irritable mid session as her lunch tray came.   RLE Assessment   RLE Assessment X   Strength RLE   RLE Overall Strength 4+/5  (functionally)   LLE Assessment   LLE Assessment X   Strength LLE   LLE Overall Strength 4+/5  (functionally)   Bed Mobility   Supine to Sit 6  Modified independent   Additional items Increased time required;HOB elevated;Bedrails   Sit to Supine 6  Modified independent   Additional items Bedrails;Increased time required   Transfers   Sit to Stand 5  Supervision   Additional items Impulsive;Verbal cues   Stand to Sit 5  Supervision   Additional items Impulsive;Verbal cues   Ambulation/Elevation   Gait pattern Decreased foot clearance;Short stride   Gait Assistance 5  Supervision   Additional items Verbal cues   Assistive Device None   Distance ~14` x1   Stair Management Assistance Not tested  (pt declining further mobility, becoming mildly agitated/irritable and wanting to go back to bed)   Balance   Static Sitting Good   Static Standing Fair   Dynamic Standing Fair   Ambulatory Fair -   Endurance Deficit   Endurance Deficit Yes   Endurance Deficit Description limited ambulation distance   Activity Tolerance   Activity Tolerance Treatment limited secondary to agitation   Nurse Made Aware Per RN, pt appropriate to evaluate   Assessment   Prognosis Fair   Problem List Decreased endurance;Decreased strength;Impaired balance;Decreased mobility;Decreased safety  awareness;Impaired judgement   Assessment Pt is a 74 y.o. female seen for PT evaluation s/p admit to [facility] on 8/18/2022 w/ SIRS (systemic inflammatory response syndrome) (HCC).  Order placed for PT. Comorbidities affecting pt's physical performance at time of assessment include: HTN and cancer history. Personal factors affecting pt at time of IE include: steps to enter environment, multi-level environment, limited home support, advanced age, and limited insight into impairments. Prior to admission, pt was independent w/ all functional mobility w/ out AD, lived in multi-level home, had 6 HARRY (+) railing, and lived alone . Upon evaluation: Pt requires mod I for bed mobility, supervision for sit to stand, and supervision for ambulation without AD.  Pt is impulsive and requires assist for line management only.  Currently, pt presents with the following deficits: impaired balance, decreased endurance, gait deviations, decreased activity tolerance, decreased safety awareness, impaired judgement, and fall risk.  Pt's clinical presentation is currently unstable/unpredictable seen in pt's presentation of impulsivity, decreased safety awareness, and poor insight into deficits.  Pt reports she has no need for and is refusing skilled PT services in hospital or after discharge.  Will discharge pt from PT caseload at this time. Please re-consult if pt's functional status declines and pt is willing to participate.   Discharge Recommendation   Rehab Resource Intensity Level, PT No post-acute rehabilitation needs   AM-PAC Basic Mobility Inpatient   Turning in Flat Bed Without Bedrails 4   Lying on Back to Sitting on Edge of Flat Bed Without Bedrails 4   Moving Bed to Chair 3   Standing Up From Chair Using Arms 3   Walk in Room 3   Climb 3-5 Stairs With Railing 3   Basic Mobility Inpatient Raw Score 20   Basic Mobility Standardized Score 43.99   [facility] Level Of Mobility   [facility] Goal 6: Walk 10 steps or  more   -HLM Achieved 6: Walk 10 steps or more   End of Consult   Patient Position at End of Consult Supine;Bed/Chair alarm activated;All needs within reach     The patient's AM-[facility] Basic Mobility Inpatient Short Form Raw Score is 20, Standardized Score is 43.99.  A Raw Score of greater than or equal to 16 suggests the patient may benefit from discharge to home. However please refer to therapist recommendation for discharge planning given other factors that may influence destination.     Adapted from Kj HOWE, Gerry J, Gerardo J, Damien PARTIDA. Association of -[facility] “6-Clicks” Basic Mobility and Daily Activity Scores With Discharge Destination. Physical Therapy, 2021;101:1-9. DOI: [email or web address]      Eileen Morales PT,DPT

## 2025-01-26 LAB
ANION GAP SERPL CALCULATED.3IONS-SCNC: 5 MMOL/L (ref 4–13)
ANISOCYTOSIS BLD QL SMEAR: PRESENT
BASOPHILS # BLD MANUAL: 0 THOUSAND/UL (ref 0–0.1)
BASOPHILS NFR MAR MANUAL: 0 % (ref 0–1)
BUN SERPL-MCNC: 18 MG/DL (ref 5–25)
CALCIUM SERPL-MCNC: 8.3 MG/DL (ref 8.4–10.2)
CHLORIDE SERPL-SCNC: 107 MMOL/L (ref 96–108)
CO2 SERPL-SCNC: 26 MMOL/L (ref 21–32)
CREAT SERPL-MCNC: 0.84 MG/DL (ref 0.6–1.3)
EOSINOPHIL # BLD MANUAL: 0 THOUSAND/UL (ref 0–0.4)
EOSINOPHIL NFR BLD MANUAL: 0 % (ref 0–6)
ERYTHROCYTE [DISTWIDTH] IN BLOOD BY AUTOMATED COUNT: 15.7 % (ref 11.6–15.1)
GFR SERPL CREATININE-BSD FRML MDRD: 68 ML/MIN/1.73SQ M
GLUCOSE SERPL-MCNC: 190 MG/DL (ref 65–140)
HCT VFR BLD AUTO: 26.2 % (ref 34.8–46.1)
HGB BLD-MCNC: 8.2 G/DL (ref 11.5–15.4)
LYMPHOCYTES # BLD AUTO: 0.06 THOUSAND/UL (ref 0.6–4.47)
LYMPHOCYTES # BLD AUTO: 1 % (ref 14–44)
MCH RBC QN AUTO: 30 PG (ref 26.8–34.3)
MCHC RBC AUTO-ENTMCNC: 31.3 G/DL (ref 31.4–37.4)
MCV RBC AUTO: 96 FL (ref 82–98)
MONOCYTES # BLD AUTO: 0.06 THOUSAND/UL (ref 0–1.22)
MONOCYTES NFR BLD: 1 % (ref 4–12)
NEUTROPHILS # BLD MANUAL: 6.25 THOUSAND/UL (ref 1.85–7.62)
NEUTS BAND NFR BLD MANUAL: 6 % (ref 0–8)
NEUTS SEG NFR BLD AUTO: 92 % (ref 43–75)
PLATELET # BLD AUTO: 127 THOUSANDS/UL (ref 149–390)
PLATELET BLD QL SMEAR: ABNORMAL
PMV BLD AUTO: 9.9 FL (ref 8.9–12.7)
POTASSIUM SERPL-SCNC: 4.2 MMOL/L (ref 3.5–5.3)
PROCALCITONIN SERPL-MCNC: 6.1 NG/ML
RBC # BLD AUTO: 2.73 MILLION/UL (ref 3.81–5.12)
RBC MORPH BLD: PRESENT
SODIUM SERPL-SCNC: 138 MMOL/L (ref 135–147)
VANCOMYCIN SERPL-MCNC: 12.6 UG/ML (ref 10–20)
WBC # BLD AUTO: 6.38 THOUSAND/UL (ref 4.31–10.16)

## 2025-01-26 PROCEDURE — 84145 PROCALCITONIN (PCT): CPT

## 2025-01-26 PROCEDURE — 80202 ASSAY OF VANCOMYCIN: CPT | Performed by: STUDENT IN AN ORGANIZED HEALTH CARE EDUCATION/TRAINING PROGRAM

## 2025-01-26 PROCEDURE — 85007 BL SMEAR W/DIFF WBC COUNT: CPT

## 2025-01-26 PROCEDURE — 85027 COMPLETE CBC AUTOMATED: CPT

## 2025-01-26 PROCEDURE — 80048 BASIC METABOLIC PNL TOTAL CA: CPT | Performed by: STUDENT IN AN ORGANIZED HEALTH CARE EDUCATION/TRAINING PROGRAM

## 2025-01-26 RX ADMIN — SENNOSIDES 17.2 MG: 8.6 TABLET, FILM COATED ORAL at 09:08

## 2025-01-26 RX ADMIN — GABAPENTIN 100 MG: 100 CAPSULE ORAL at 16:08

## 2025-01-26 RX ADMIN — GABAPENTIN 100 MG: 100 CAPSULE ORAL at 22:18

## 2025-01-26 RX ADMIN — LEVETIRACETAM 1000 MG: 500 TABLET, FILM COATED ORAL at 11:16

## 2025-01-26 RX ADMIN — DEXAMETHASONE 2 MG: 2 TABLET ORAL at 09:08

## 2025-01-26 RX ADMIN — LEVOTHYROXINE SODIUM 50 MCG: 0.05 TABLET ORAL at 05:39

## 2025-01-26 RX ADMIN — GABAPENTIN 100 MG: 100 CAPSULE ORAL at 09:07

## 2025-01-26 RX ADMIN — AMLODIPINE BESYLATE 5 MG: 5 TABLET ORAL at 09:08

## 2025-01-26 RX ADMIN — CYANOCOBALAMIN TAB 500 MCG 1000 MCG: 500 TAB at 09:07

## 2025-01-26 RX ADMIN — ACETAMINOPHEN 975 MG: 325 TABLET, FILM COATED ORAL at 19:41

## 2025-01-26 RX ADMIN — FOLIC ACID 1 MG: 1 TABLET ORAL at 09:07

## 2025-01-26 RX ADMIN — PANTOPRAZOLE SODIUM 40 MG: 40 TABLET, DELAYED RELEASE ORAL at 05:39

## 2025-01-26 RX ADMIN — DEXAMETHASONE 2 MG: 2 TABLET ORAL at 22:18

## 2025-01-26 RX ADMIN — RIVAROXABAN 20 MG: 20 TABLET, FILM COATED ORAL at 07:34

## 2025-01-26 RX ADMIN — GABAPENTIN 200 MG: 100 CAPSULE ORAL at 22:17

## 2025-01-26 RX ADMIN — ACETAMINOPHEN 975 MG: 325 TABLET, FILM COATED ORAL at 09:10

## 2025-01-26 RX ADMIN — LEVETIRACETAM 1000 MG: 500 TABLET, FILM COATED ORAL at 22:17

## 2025-01-26 NOTE — ASSESSMENT & PLAN NOTE
Follows with Dr. Castro- medical oncology  Completed cycle 2 chemo on 11/14.   Infusion 1/23- carboplatin and pemetrexed, will stop carboplatin  Will start on low dose Avastin.  SBRT treatment on 1/16  MRI ordered for 3/27.

## 2025-01-26 NOTE — PROGRESS NOTES
Progress Note - Hospitalist   Name: Ayla Nye 74 y.o. female I MRN: [id number]  Unit/Bed#: S -01 I Date of Admission: 1/23/2025   Date of Service: 1/26/2025 I Hospital Day: 2    Assessment & Plan  SIRS (systemic inflammatory response syndrome) (HCC)  3-day history of nonradiating low back pain, fever and chills.  Recent treatment for chemotherapy 1/23/2025.  CT abdomen pelvis show bilateral perinephric stranding, with right urothelial thickening, concerning for urinary tract infection   Severe sepsis criteria with oral temperature 101-102 °F, initial tachycardia 101, leukocytosis 2.36, bandemia 9%, calcitonin 0.35, lactic acidosis 2.3   Etiology of sepsis unclear, bacteremia vs carboplatin induced fever vs UTI vs pneumonitis.    Upon recent admission, patient was diagnosed with pneumocystis jiroveci  One dose of cefepime given in ED, now on Rocephin  CTA PE 1/24: 1. No evidence of acute pulmonary embolism. Stable tiny chronic embolus in a left lower lobe segmental artery. 2. Small bilateral pleural effusions. Otherwise no significant interval change in exam when compared to CT chest, abdomen, and pelvis examination from day prior.  Consult ID, recommendation appreciated  Oncology consulted, recommendation appreciated  Pro-Du trending down  Blood cultures negative at 48 hours  MRSA negative  Respiratory panel negative  Urine strep and Legionella negative  Strep PCR negative  UA unremarkable  Pro-Du trending down    Plan  Switched from cefepime to Ceftriaxone 1/24, added vancomycin for broader coverage for presumable bacteremia with unidentified source.  Consider PCP PCR if blood cultures come back negative  Antibiotics discontinued on 1/26/2025.  We will monitor of ABX for 24 hours.  Follow-up blood culture  Monitor vitals  Trend CBC/CMP  Metastatic adenocarcinoma (HCC)  Follows with Dr. Castro- medical oncology  Completed cycle 2 chemo on 11/14.   Infusion 1/23- carboplatin and pemetrexed, will  stop carboplatin  Will start on low dose Avastin.  SBRT treatment on 1/16  Next hem/onc appt 1/27  MRI ordered for 3/27.  Cancer associated pain  Follows with palliative care  Current regimen:  Gabapentin 100mg AM, 100mg at noon, 300mg Bedtime  Tylenol 650mg PRN  Heating pad during the day  Zofran for nausea  Failed therapies:  Celebrex 200mg BID  Oxycodone- felt like she had a headache from the medication  Would like to avoid opiates  Bowel regimen to prevent OIC:  Senna    History of pulmonary embolism  Dx during Nov/Dec admission   in the setting of malignancy  Continue Xarelto 20 mg QD  History of Pneumocystis jirovecii pneumonia  Diagnosed on last admission with patient's progressive respiratory symptoms despite antibiotics however course also complicated by possibility of progressing cancer as well as immunotherapy induced pneumonitis. Patient completed 21-day course of empiric therapy. She remains on at least 4 mg of Decadron a day.  Continue on Bactrim prophylaxis  Prophylaxis dosing adjusted for 3 times weekly  Continue antibiotics otherwise as above  Monitor respiratory status  Trend fever curve/vitals    VTE Pharmacologic Prophylaxis: VTE Score: 11 High Risk (Score >/= 5) - Pharmacological DVT Prophylaxis Ordered: rivaroxaban (Xarelto). Sequential Compression Devices Ordered.    Mobility:   Basic Mobility Inpatient Raw Score: 23  JH-HLM Goal: 7: Walk 25 feet or more  JH-HLM Achieved: 7: Walk 25 feet or more  JH-HLM Goal achieved. Continue to encourage appropriate mobility.    Patient Centered Rounds: I performed bedside rounds with nursing staff today.   Discussions with Specialists or Other Care Team Provider:     Education and Discussions with Family / Patient: Updated  (daughter) via phone.    Current Length of Stay: 2 day(s)  Current Patient Status: Inpatient   Certification Statement: The patient will continue to require additional inpatient hospital stay due to monitoring  vitals medical status off antibiotics  Discharge Plan: Anticipate discharge in 24-48 hrs to home.    Code Status: Level 1 - Full Code    Subjective   Patient was examined at bedside.  Patient was not in acute distress.  No events reported by nursing overnight.  She did not have any complaints or concerns and she report significant improvement in her back pain as well.  Patient is having some headache that is responsive to Tylenol.  Patient denies visual changes, dizziness, lightheadedness, nausea or vomiting,.  Patient also denies chest pain, SOB, abdominal pain, blood in the stool, hematuria, pain or burning while urination.  Patient had questions about the effectiveness of the chemotherapy and visit with to continue the treatment, given her recurrent hospitalizations.  I counseled the patient about her malignancy prognosis and will follow-up on further discussion with the family as well.      Objective :  Temp:  [98.9 °F (37.2 °C)] 98.9 °F (37.2 °C)  HR:  [85] 85  BP: (140)/(75) 140/75  Resp:  [24] 24  SpO2:  [92 %] 92 %  O2 Device: None (Room air)    There is no height or weight on file to calculate BMI.     Input and Output Summary (last 24 hours):     Intake/Output Summary (Last 24 hours) at 1/26/2025 1131  Last data filed at 1/26/2025 0001  Gross per 24 hour   Intake --   Output 900 ml   Net -900 ml       Physical Exam  Vitals and nursing note reviewed.   Constitutional:       General: She is not in acute distress.     Appearance: Normal appearance. She is well-developed. She is not ill-appearing.   HENT:      Head: Normocephalic and atraumatic.   Eyes:      Conjunctiva/sclera: Conjunctivae normal.   Cardiovascular:      Rate and Rhythm: Normal rate and regular rhythm.      Pulses: Normal pulses.      Heart sounds: Normal heart sounds. No murmur heard.  Pulmonary:      Effort: Pulmonary effort is normal. No respiratory distress.      Breath sounds: Normal breath sounds. No wheezing.   Abdominal:       Palpations: Abdomen is soft.      Tenderness: There is no abdominal tenderness. There is no guarding.   Musculoskeletal:         General: No swelling.      Cervical back: Neck supple.      Right lower leg: No edema.      Left lower leg: No edema.   Skin:     General: Skin is warm and dry.      Capillary Refill: Capillary refill takes less than 2 seconds.   Neurological:      Mental Status: She is alert.      Motor: No weakness.   Psychiatric:         Mood and Affect: Mood normal.         Behavior: Behavior normal.           Lines/Drains:              Lab Results: I have reviewed the following results:   Results from last 7 days   Lab Units 01/26/25  0443   WBC Thousand/uL 6.38   HEMOGLOBIN g/dL 8.2*   HEMATOCRIT % 26.2*   PLATELETS Thousands/uL 127*   BANDS PCT % 6   LYMPHO PCT % 1*   MONO PCT % 1*   EOS PCT % 0     Results from last 7 days   Lab Units 01/26/25  0443 01/25/25  0401   SODIUM mmol/L 138 135   POTASSIUM mmol/L 4.2 4.4   CHLORIDE mmol/L 107 104   CO2 mmol/L 26 23   BUN mg/dL 18 16   CREATININE mg/dL 0.84 0.94   ANION GAP mmol/L 5 8   CALCIUM mg/dL 8.3* 8.2*   ALBUMIN g/dL  --  3.1*   TOTAL BILIRUBIN mg/dL  --  0.31   ALK PHOS U/L  --  44   ALT U/L  --  15   AST U/L  --  15   GLUCOSE RANDOM mg/dL 190* 123     Results from last 7 days   Lab Units 01/23/25  2145   INR  1.24*             Results from last 7 days   Lab Units 01/26/25  0443 01/25/25  0401 01/24/25  0544 01/24/25  0313 01/24/25  0009 01/23/25  2145   LACTIC ACID mmol/L  --   --   --  2.0 1.9 2.3*   PROCALCITONIN ng/ml 6.10* 13.09* 29.04*  --   --  8.35*       Recent Cultures (last 7 days):   Results from last 7 days   Lab Units 01/24/25  0946 01/23/25  2145   BLOOD CULTURE   --  No Growth at 48 hrs.  No Growth at 48 hrs.   LEGIONELLA URINARY ANTIGEN  Negative  --        Imaging Results Review: No pertinent imaging studies reviewed.  Other Study Results Review: No additional pertinent studies reviewed.    Last 24 Hours Medication List:      Current Facility-Administered Medications:     acetaminophen (TYLENOL) tablet 975 mg, Q6H PRN    amLODIPine (NORVASC) tablet 5 mg, Daily    cyanocobalamin (VITAMIN B-12) tablet 1,000 mcg, Daily    dexamethasone (DECADRON) tablet 2 mg, BID **FOLLOWED BY** [START ON 1/29/2025] dexamethasone (DECADRON) tablet 2 mg, Daily    folic acid (FOLVITE) tablet 1 mg, Daily    gabapentin (NEURONTIN) capsule 100 mg, TID    gabapentin (NEURONTIN) capsule 100 mg, Once    gabapentin (NEURONTIN) capsule 200 mg, HS    levETIRAcetam (KEPPRA) tablet 1,000 mg, Q12H    levothyroxine tablet 50 mcg, Early Morning    multi-electrolyte (PLASMALYTE-A/ISOLYTE-S PH 7.4) IV solution, Continuous, Last Rate: 75 mL/hr (01/25/25 0557)    pantoprazole (PROTONIX) EC tablet 40 mg, Early Morning    rivaroxaban (XARELTO) tablet 20 mg, Daily With Breakfast    senna (SENOKOT) tablet 17.2 mg, Daily    [START ON 1/27/2025] sulfamethoxazole-trimethoprim (BACTRIM DS) 800-160 mg per tablet 1 tablet, Once per day on Monday Wednesday Friday    Administrative Statements   Today, Patient Was Seen By: Greg Posey MD    **Please Note: This note may have been constructed using a voice recognition system.**

## 2025-01-26 NOTE — PLAN OF CARE
Problem: Potential for Falls  Goal: Patient will remain free of falls  Description: INTERVENTIONS:  - Educate patient/family on patient safety including physical limitations  - Instruct patient to call for assistance with activity   - Consult OT/PT to assist with strengthening/mobility   - Keep Call bell within reach  - Keep bed low and locked with side rails adjusted as appropriate  - Keep care items and personal belongings within reach  - Initiate and maintain comfort rounds  - Make Fall Risk Sign visible to staff  - Offer Toileting every 2 Hours, in advance of need  - Obtain necessary fall risk management equipment  - Apply yellow socks and bracelet for high fall risk patients  - Consider moving patient to room near nurses station  Outcome: Progressing

## 2025-01-26 NOTE — INCIDENTAL FINDINGS
The following findings require follow up:  Radiographic finding   Finding: CT chest/abdomen/pelvis with contrast:  1.  Findings consistent with disease progression, with rapid interval enlargement of left lung pulmonary nodules when compared to the CT 1/14/2025, increase in right upper lobe mass, and increase in right renal mass. There has been interval decrease in   size of right ischiorectal fossa mass when compared to 11/24/2024.   2.  Bilateral perinephric stranding, with right urothelial thickening, concerning for urinary tract infection   3.  Mild T5 compression deformity, appears subacute      Follow up required: Hematology/oncology   Follow up should be done within 2 month(s)    Please notify the following clinician to assist with the follow up:   Dr. ojeda    Incidental finding results were discussed with the Patient by Greg Posey MD on 01/26/25.   They expressed understanding and all questions answered.

## 2025-01-26 NOTE — TREATMENT PLAN
Patient's chart reviewed and fever curve has  down.  Respiratory viral panel, urinary antigens, MRSA culture and blood cultures now negative at 48 hours.  No new concerning imaging.  Vitals otherwise stable.  As per prior consult note will discontinue current antibiotics given blood cultures are no negative at 48 hours.  Would continue to trend fever curve/vitals.  Repeat CBC/chemistry tomorrow to monitor stability off antibiotic going forward.  Will continue the patient otherwise on Bactrim prophylaxis.  Additional supportive care as per primary.    ID consult service will reevaluate this patient again tomorrow.  Please contact ID attending on call if questions in the interim.

## 2025-01-26 NOTE — DISCHARGE SUMMARY
Discharge Summary - Hospitalist   Name: Ayla Nye 74 y.o. female I MRN: [id number]  Unit/Bed#: S -01 I Date of Admission: 1/23/2025   Date of Service: 1/28/2025 I Hospital Day: 4     Assessment & Plan  SIRS (systemic inflammatory response syndrome) (HCC) (Resolved: 1/28/2025)  3-day history of nonradiating low back pain, fever and chills.  Recent treatment for chemotherapy 1/23/2025.  CT abdomen pelvis show bilateral perinephric stranding, with right urothelial thickening, concerning for urinary tract infection   Severe sepsis criteria with oral temperature 101-102 °F, initial tachycardia 101, leukocytosis 2.36, bandemia 9%, calcitonin 0.35, lactic acidosis 2.3   Etiology of sepsis unclear, bacteremia vs carboplatin induced fever vs UTI vs pneumonitis.    Upon recent admission, patient was diagnosed with pneumocystis jiroveci  One dose of cefepime given in ED, now on Rocephin  CTA PE 1/24: 1. No evidence of acute pulmonary embolism. Stable tiny chronic embolus in a left lower lobe segmental artery. 2. Small bilateral pleural effusions. Otherwise no significant interval change in exam when compared to CT chest, abdomen, and pelvis examination from day prior.  ID consulted recommendation appreciated  Oncology consulted, recommendation appreciated  Both ID and oncology believe fever is likely due to tumor progress and chemotherapy treatment.  They cleared the patient to be discharged and follow-up with her primary oncologist outpatient    Plan  Blood culture no growth after 4 days  Switched from cefepime to Ceftriaxone 1/24, added vancomycin for broader coverage for presumable bacteremia with unidentified source.  Antibiotics treatment discontinued on 1/26  Patient was medically stable for 48 hours after antibiotic discontinuation  Metastatic adenocarcinoma (HCC)  Follows with Dr. Castro- medical oncology  Completed cycle 2 chemo on 11/14.   Infusion 1/23- carboplatin and pemetrexed, will stop  carboplatin  Will start on low dose Avastin.  SBRT treatment on 1/16  MRI ordered for 3/27.  Cancer associated pain  Follows with palliative care  Current regimen:  Gabapentin 100mg AM, 100mg at noon, 300mg Bedtime  Tylenol 650mg PRN  Heating pad during the day  Zofran for nausea  Failed therapies:  Celebrex 200mg BID  Oxycodone- felt like she had a headache from the medication  Would like to avoid opiates  Bowel regimen to prevent OIC:  Senna    History of pulmonary embolism (Resolved: 1/28/2025)  Dx during Nov/Dec admission   in the setting of malignancy  Continue Xarelto 20 mg QD  History of Pneumocystis jirovecii pneumonia  Diagnosed on last admission with patient's progressive respiratory symptoms despite antibiotics however course also complicated by possibility of progressing cancer as well as immunotherapy induced pneumonitis. Patient completed 21-day course of empiric therapy. She remains on at least 4 mg of Decadron a day.  Continue on Bactrim prophylaxis  Prophylaxis dosing adjusted for 3 times weekly  Continue antibiotics otherwise as above       Medical Problems       Resolved Problems  Date Reviewed: 1/23/2025          Resolved    * (Principal) SIRS (systemic inflammatory response syndrome) (HCC) 1/28/2025     Resolved by  Greg Posey MD    History of pulmonary embolism 1/28/2025     Resolved by  Greg Posey MD    Acute cystitis without hematuria 1/24/2025     Resolved by  Curtis Elizabeth MD        Discharging Physician / Practitioner: Greg Posey MD  PCP: Rafy Angelo MD  Admission Date:   Admission Orders (From admission, onward)       Ordered        01/24/25 0044  INPATIENT ADMISSION  Once                          Discharge Date: 01/28/25    Consultations During Hospital Stay:  Infectious disease  Oncology    Procedures Performed:   None    Significant Findings / Test Results:   CTA chest:  1. No evidence of acute pulmonary embolism. Stable tiny chronic embolus in a left lower lobe  segmental artery.   2. Small bilateral pleural effusions. Otherwise no significant interval change in exam when compared to CT chest, abdomen, and pelvis examination from day prior.     CT chest/abdomen/pelvis with contrast:  1.  Findings consistent with disease progression, with rapid interval enlargement of left lung pulmonary nodules when compared to the CT 1/14/2025, increase in right upper lobe mass, and increase in right renal mass. There has been interval decrease in   size of right ischiorectal fossa mass when compared to 11/24/2024.   2.  Bilateral perinephric stranding, with right urothelial thickening, concerning for urinary tract infection   3.  Mild T5 compression deformity, appears subacute     MRSA negative  Blood culture negative at 48 hours    Incidental Findings:   CTA chest:  1. No evidence of acute pulmonary embolism. Stable tiny chronic embolus in a left lower lobe segmental artery.   2. Small bilateral pleural effusions. Otherwise no significant interval change in exam when compared to CT chest, abdomen, and pelvis examination from day prior.     CT chest/abdomen/pelvis with contrast:  1.  Findings consistent with disease progression, with rapid interval enlargement of left lung pulmonary nodules when compared to the CT 1/14/2025, increase in right upper lobe mass, and increase in right renal mass. There has been interval decrease in   size of right ischiorectal fossa mass when compared to 11/24/2024.   2.  Bilateral perinephric stranding, with right urothelial thickening, concerning for urinary tract infection   3.  Mild T5 compression deformity, appears subacute    I reviewed the above mentioned incidental findings with the patient and/or family and they expressed understanding.    Test Results Pending at Discharge (will require follow up):   None     Outpatient Tests Requested:  None    Complications: None    Reason for Admission: Fever    Hospital Course:   Ayla Nye is a 74 y.o.  female patient who originally presented to the hospital on 1/23/2025 due to fever.    Patient has a PMH of lung cancer with metastasis, HLD, HTN, Hx of PCP on prophylaxis home presented with fever of 103 after her chemotherapy on 1/23.  Patient also reported nausea and fatigue.  In the ED, patient went sepsis criteria: Temp 101.2, tachycardia, leukocytosis, lactic acid 2.3, Pro-Du 8.3.  CT scan of the abdomen showed bilateral perinephric stranding concerning for possible UTI.  Patient also required 2 L of oxygen supplement.  Patient was admitted, she was started on Rocephin and vancomycin.  Infectious disease and oncology were consulted, they believe her fever and elevated Pro-Du is due to malignancy prognosis and chemotherapy.  Her lactic acid, Pro-Du, WBC trended down during her hospital course.  Antibiotics were discontinued on 1/26.  Her vitals were monitored for 48 hours.  Patient did not develop fever.  Patient was asymptomatic during his stay as well.  Blood cultures showed no growth after 4 days.  Patient was on room air and her oxygen saturation trend 96%.  Patient was medically and clinically stable.  Patient was advised to follow-up with oncology for further chemotherapy treatment plans.  Patient was discharged and return precautions were instructed.          Please see above list of diagnoses and related plan for additional information.     Condition at Discharge: fair    Discharge Day Visit / Exam:   Subjective:    Patient was examined at bedside.  Patient was not in acute distress.  She was on room air and satting at 92%.  She did not have any complaints or concerns.  Patient denies headaches, dizziness, chest pain, SOB, abdominal pain, pain or burning while urination.  Patient had a bowel movement in the past 24 hours.  Plan to discharge today as the home aide will be available.    Vitals: Blood Pressure: 138/90 (01/28/25 0745)  Pulse: (!) 119 (01/28/25 0745)  Temperature: 98.6 °F (37 °C) (01/28/25  0745)  Temp Source: Oral (01/28/25 0745)  Respirations: 16 (01/28/25 0745)  SpO2: 92 % (01/28/25 0903)  Physical Exam  Vitals and nursing note reviewed.   Constitutional:       General: She is not in acute distress.     Appearance: Normal appearance. She is well-developed. She is not ill-appearing.   HENT:      Head: Normocephalic and atraumatic.   Eyes:      General: No scleral icterus.     Conjunctiva/sclera: Conjunctivae normal.   Cardiovascular:      Rate and Rhythm: Normal rate and regular rhythm.      Pulses: Normal pulses.      Heart sounds: Normal heart sounds. No murmur heard.  Pulmonary:      Effort: Pulmonary effort is normal. No respiratory distress.      Breath sounds: Normal breath sounds. No wheezing or rhonchi.   Abdominal:      Palpations: Abdomen is soft.      Tenderness: There is no abdominal tenderness. There is no guarding or rebound.   Musculoskeletal:         General: No swelling.      Cervical back: Neck supple.      Right lower leg: No edema.      Left lower leg: No edema.   Skin:     General: Skin is warm and dry.      Capillary Refill: Capillary refill takes less than 2 seconds.      Findings: No lesion.   Neurological:      Mental Status: She is alert and oriented to person, place, and time.      Motor: No weakness.   Psychiatric:         Mood and Affect: Mood normal.         Behavior: Behavior normal.          Discussion with Family: Updated  (daughter) via phone.    Discharge instructions/Information to patient and family:   See after visit summary for information provided to patient and family.      Provisions for Follow-Up Care:  See after visit summary for information related to follow-up care and any pertinent home health orders.      Mobility at time of Discharge:   Basic Mobility Inpatient Raw Score: 23  JH-HLM Goal: 7: Walk 25 feet or more  JH-HLM Achieved: 8: Walk 250 feet ot more  HLM Goal achieved. Continue to encourage appropriate mobility.     Disposition:    Home    Planned Readmission: none    Discharge Medications:  See after visit summary for reconciled discharge medications provided to patient and/or family.      Administrative Statements   Discharge Statement:    **Please Note: This note may have been constructed using a voice recognition system**

## 2025-01-26 NOTE — ASSESSMENT & PLAN NOTE
Follows with Dr. Castro- medical oncology  Completed cycle 2 chemo on 11/14.   Infusion 1/23- carboplatin and pemetrexed, will stop carboplatin  Will start on low dose Avastin.  SBRT treatment on 1/16  Next hem/onc appt 1/27  MRI ordered for 3/27.   Statement Selected

## 2025-01-26 NOTE — ASSESSMENT & PLAN NOTE
Diagnosed on last admission with patient's progressive respiratory symptoms despite antibiotics however course also complicated by possibility of progressing cancer as well as immunotherapy induced pneumonitis. Patient completed 21-day course of empiric therapy. She remains on at least 4 mg of Decadron a day.  Continue on Bactrim prophylaxis  Prophylaxis dosing adjusted for 3 times weekly  Continue antibiotics otherwise as above

## 2025-01-26 NOTE — ASSESSMENT & PLAN NOTE
3-day history of nonradiating low back pain, fever and chills.  Recent treatment for chemotherapy 1/23/2025.  CT abdomen pelvis show bilateral perinephric stranding, with right urothelial thickening, concerning for urinary tract infection   Severe sepsis criteria with oral temperature 101-102 °F, initial tachycardia 101, leukocytosis 2.36, bandemia 9%, calcitonin 0.35, lactic acidosis 2.3   Etiology of sepsis unclear, bacteremia vs carboplatin induced fever vs UTI vs pneumonitis.    Upon recent admission, patient was diagnosed with pneumocystis jiroveci  One dose of cefepime given in ED, now on Rocephin  CTA PE 1/24: 1. No evidence of acute pulmonary embolism. Stable tiny chronic embolus in a left lower lobe segmental artery. 2. Small bilateral pleural effusions. Otherwise no significant interval change in exam when compared to CT chest, abdomen, and pelvis examination from day prior.  Consult ID, recommendation appreciated  Oncology consulted, recommendation appreciated  Pro-Du trending down  Blood cultures negative at 48 hours  MRSA negative  Respiratory panel negative  Urine strep and Legionella negative  Strep PCR negative  UA unremarkable  Pro-Du trending down    Plan  Switched from cefepime to Ceftriaxone 1/24, added vancomycin for broader coverage for presumable bacteremia with unidentified source.  Consider PCP PCR if blood cultures come back negative  Antibiotics discontinued on 1/26/2025.  We will monitor of ABX for 24 hours.  Follow-up blood culture  Monitor vitals  Trend CBC/CMP

## 2025-01-26 NOTE — DISCHARGE INSTR - AVS FIRST PAGE
Dear Ayla Nye,     It was our pleasure to care for you here at [facility].  It is our hope that we were always able to exceed the expected standards for your care during your stay.  You were hospitalized due to fever.  You were cared for on the medical surgical floor by Greg Posey MD under the service of Yulissa Calderón MD with the [facility] who covers for your primary care physician (PCP), Rafy Angelo MD, while you were hospitalized.  If you have any questions or concerns related to this hospitalization, you may contact us at .  For follow up as well as any medication refills, we recommend that you follow up with your primary care physician.  A registered nurse will reach out to you by phone within a few days after your discharge to answer any additional questions that you may have after going home.  However, at this time we provide for you here, the most important instructions / recommendations at discharge:     Notable Medication Adjustments -   No medications have been added during this hospital visit.  Continue taking Bactrim 3 times weekly for PCP prophylaxis.  Please continue your previously prescribed medications.  Testing Required after Discharge -   No lab works at the moment  Follow-up with your oncologist for possible repeat imaging in 2 months after continuing 2 cycles of chemotherapy.  ** Please contact your PCP to request testing orders for any of the testing recommended here **  Important follow up information -   Please follow-up with your oncologist.  Appointment scheduled on 12/4/2025  Follow-up with your PCP, appointment scheduled on 2/12/2025  Follow-up with pulmonology appointment scheduled on 1/29/2025  Follow-up with palliative medicine appointment scheduled on 2/19/2025  Other Instructions -   Please return for worsening fever, shortness of breath, chest pain, nausea or vomiting, severe back pain,  diarrhea or any unusual symptoms.  Please review this entire after visit summary as additional general instructions including medication list, appointments, activity, diet, any pertinent wound care, and other additional recommendations from your care team that may be provided for you.      Sincerely,     Greg Posey MD

## 2025-01-26 NOTE — ASSESSMENT & PLAN NOTE
3-day history of nonradiating low back pain, fever and chills.  Recent treatment for chemotherapy 1/23/2025.  CT abdomen pelvis show bilateral perinephric stranding, with right urothelial thickening, concerning for urinary tract infection   Severe sepsis criteria with oral temperature 101-102 °F, initial tachycardia 101, leukocytosis 2.36, bandemia 9%, calcitonin 0.35, lactic acidosis 2.3   Etiology of sepsis unclear, bacteremia vs carboplatin induced fever vs UTI vs pneumonitis.    Upon recent admission, patient was diagnosed with pneumocystis jiroveci  One dose of cefepime given in ED, now on Rocephin  CTA PE 1/24: 1. No evidence of acute pulmonary embolism. Stable tiny chronic embolus in a left lower lobe segmental artery. 2. Small bilateral pleural effusions. Otherwise no significant interval change in exam when compared to CT chest, abdomen, and pelvis examination from day prior.  ID consulted recommendation appreciated  Oncology consulted, recommendation appreciated  Both ID and oncology believe fever is likely due to tumor progress and chemotherapy treatment.  They cleared the patient to be discharged and follow-up with her primary oncologist outpatient    Plan  Blood culture no growth after 4 days  Switched from cefepime to Ceftriaxone 1/24, added vancomycin for broader coverage for presumable bacteremia with unidentified source.  Antibiotics treatment discontinued on 1/26  Patient was medically stable for 48 hours after antibiotic discontinuation

## 2025-01-27 ENCOUNTER — HOSPITAL ENCOUNTER (OUTPATIENT)
Dept: INFUSION CENTER | Facility: HOSPITAL | Age: 75
Discharge: HOME | End: 2025-01-27

## 2025-01-27 LAB
ALBUMIN SERPL BCG-MCNC: 3.1 G/DL (ref 3.5–5)
ALP SERPL-CCNC: 38 U/L (ref 34–104)
ALT SERPL W P-5'-P-CCNC: 15 U/L (ref 7–52)
ANION GAP SERPL CALCULATED.3IONS-SCNC: 7 MMOL/L (ref 4–13)
ANISOCYTOSIS BLD QL SMEAR: PRESENT
AST SERPL W P-5'-P-CCNC: 16 U/L (ref 13–39)
BASOPHILS # BLD MANUAL: 0 THOUSAND/UL (ref 0–0.1)
BASOPHILS NFR MAR MANUAL: 0 % (ref 0–1)
BILIRUB SERPL-MCNC: 0.29 MG/DL (ref 0.2–1)
BUN SERPL-MCNC: 20 MG/DL (ref 5–25)
CALCIUM ALBUM COR SERPL-MCNC: 9.4 MG/DL (ref 8.3–10.1)
CALCIUM SERPL-MCNC: 8.7 MG/DL (ref 8.4–10.2)
CHLORIDE SERPL-SCNC: 105 MMOL/L (ref 96–108)
CO2 SERPL-SCNC: 27 MMOL/L (ref 21–32)
CREAT SERPL-MCNC: 0.88 MG/DL (ref 0.6–1.3)
DACRYOCYTES BLD QL SMEAR: PRESENT
DME PARACHUTE DELIVERY DATE ACTUAL: NORMAL
DME PARACHUTE DELIVERY DATE REQUESTED: NORMAL
DME PARACHUTE DELIVERY NOTE: NORMAL
DME PARACHUTE ITEM DESCRIPTION: NORMAL
DME PARACHUTE ORDER STATUS: NORMAL
DME PARACHUTE SUPPLIER NAME: NORMAL
DME PARACHUTE SUPPLIER PHONE: NORMAL
EOSINOPHIL # BLD MANUAL: 0.11 THOUSAND/UL (ref 0–0.4)
EOSINOPHIL NFR BLD MANUAL: 2 % (ref 0–6)
ERYTHROCYTE [DISTWIDTH] IN BLOOD BY AUTOMATED COUNT: 15.7 % (ref 11.6–15.1)
GFR SERPL CREATININE-BSD FRML MDRD: 64 ML/MIN/1.73SQ M
GLUCOSE SERPL-MCNC: 95 MG/DL (ref 65–140)
HCT VFR BLD AUTO: 25.4 % (ref 34.8–46.1)
HGB BLD-MCNC: 8.2 G/DL (ref 11.5–15.4)
LYMPHOCYTES # BLD AUTO: 0.17 THOUSAND/UL (ref 0.6–4.47)
LYMPHOCYTES # BLD AUTO: 2 % (ref 14–44)
MCH RBC QN AUTO: 31.1 PG (ref 26.8–34.3)
MCHC RBC AUTO-ENTMCNC: 32.3 G/DL (ref 31.4–37.4)
MCV RBC AUTO: 96 FL (ref 82–98)
MICROCYTES BLD QL AUTO: PRESENT
MONOCYTES # BLD AUTO: 0 THOUSAND/UL (ref 0–1.22)
MONOCYTES NFR BLD: 0 % (ref 4–12)
NEUTROPHILS # BLD MANUAL: 5.4 THOUSAND/UL (ref 1.85–7.62)
NEUTS SEG NFR BLD AUTO: 95 % (ref 43–75)
PLASMA CELLS NFR BLD: 1 % (ref 0–0)
PLATELET # BLD AUTO: 119 THOUSANDS/UL (ref 149–390)
PLATELET BLD QL SMEAR: ADEQUATE
PMV BLD AUTO: 9 FL (ref 8.9–12.7)
POIKILOCYTOSIS BLD QL SMEAR: PRESENT
POTASSIUM SERPL-SCNC: 3.9 MMOL/L (ref 3.5–5.3)
PROT SERPL-MCNC: 5.5 G/DL (ref 6.4–8.4)
RBC # BLD AUTO: 2.64 MILLION/UL (ref 3.81–5.12)
RBC MORPH BLD: PRESENT
SODIUM SERPL-SCNC: 139 MMOL/L (ref 135–147)
WBC # BLD AUTO: 5.68 THOUSAND/UL (ref 4.31–10.16)

## 2025-01-27 PROCEDURE — 85007 BL SMEAR W/DIFF WBC COUNT: CPT | Performed by: STUDENT IN AN ORGANIZED HEALTH CARE EDUCATION/TRAINING PROGRAM

## 2025-01-27 PROCEDURE — NC001 PR NO CHARGE: Performed by: INTERNAL MEDICINE

## 2025-01-27 PROCEDURE — 80053 COMPREHEN METABOLIC PANEL: CPT | Performed by: STUDENT IN AN ORGANIZED HEALTH CARE EDUCATION/TRAINING PROGRAM

## 2025-01-27 PROCEDURE — G0545 PR INHERENT VISIT TO INPT: HCPCS | Performed by: INTERNAL MEDICINE

## 2025-01-27 PROCEDURE — 85027 COMPLETE CBC AUTOMATED: CPT | Performed by: STUDENT IN AN ORGANIZED HEALTH CARE EDUCATION/TRAINING PROGRAM

## 2025-01-27 PROCEDURE — 99232 SBSQ HOSP IP/OBS MODERATE 35: CPT | Performed by: INTERNAL MEDICINE

## 2025-01-27 RX ADMIN — CYANOCOBALAMIN TAB 500 MCG 1000 MCG: 500 TAB at 08:53

## 2025-01-27 RX ADMIN — LEVETIRACETAM 1000 MG: 500 TABLET, FILM COATED ORAL at 11:01

## 2025-01-27 RX ADMIN — LEVETIRACETAM 1000 MG: 500 TABLET, FILM COATED ORAL at 21:30

## 2025-01-27 RX ADMIN — DEXAMETHASONE 2 MG: 2 TABLET ORAL at 21:30

## 2025-01-27 RX ADMIN — LEVOTHYROXINE SODIUM 50 MCG: 0.05 TABLET ORAL at 05:49

## 2025-01-27 RX ADMIN — PANTOPRAZOLE SODIUM 40 MG: 40 TABLET, DELAYED RELEASE ORAL at 05:48

## 2025-01-27 RX ADMIN — ACETAMINOPHEN 975 MG: 325 TABLET, FILM COATED ORAL at 17:15

## 2025-01-27 RX ADMIN — GABAPENTIN 100 MG: 100 CAPSULE ORAL at 08:54

## 2025-01-27 RX ADMIN — SULFAMETHOXAZOLE AND TRIMETHOPRIM 1 TABLET: 800; 160 TABLET ORAL at 08:53

## 2025-01-27 RX ADMIN — RIVAROXABAN 20 MG: 20 TABLET, FILM COATED ORAL at 08:54

## 2025-01-27 RX ADMIN — ACETAMINOPHEN 975 MG: 325 TABLET, FILM COATED ORAL at 05:48

## 2025-01-27 RX ADMIN — DEXAMETHASONE 2 MG: 2 TABLET ORAL at 08:53

## 2025-01-27 RX ADMIN — GABAPENTIN 100 MG: 100 CAPSULE ORAL at 21:43

## 2025-01-27 RX ADMIN — AMLODIPINE BESYLATE 5 MG: 5 TABLET ORAL at 08:54

## 2025-01-27 RX ADMIN — FOLIC ACID 1 MG: 1 TABLET ORAL at 08:54

## 2025-01-27 RX ADMIN — GABAPENTIN 100 MG: 100 CAPSULE ORAL at 17:12

## 2025-01-27 RX ADMIN — GABAPENTIN 200 MG: 100 CAPSULE ORAL at 21:30

## 2025-01-27 NOTE — PROGRESS NOTES
Progress Note - Infectious Disease   Name: Ayla Nye 74 y.o. female I MRN: [id number]  Unit/Bed#: S -01 I Date of Admission: 1/23/2025   Date of Service: 1/27/2025 I Hospital Day: 3     Assessment & Plan  SIRS (systemic inflammatory response syndrome) (HCC)  High fever, leukopenia tachycardia.  UA unremarkable.  Patient predominantly with persistent back pain in the mid back along with some discomfort in the shoulders tracking across.  She has no port or intravascular devices.  Imaging reviewed with progressive burden of disease.  Patient had chemotherapy on 1/23.  Workup with blood cultures, COVID/flu testing, RP 2 negative.  Back pain and fevers overall improving and I suspect they may be more so related to progressing disease and also compression fracture seen on imaging.  Fever likely also related to recent chemo session.  Given negative workup antibiotics discontinued.  Maintain off antibiotics  Continue PJP prophylaxis as below with Bactrim  Trend fever curve/WBC while admitted  Ongoing follow-up by oncology  Monitor for new/developing symptoms on exam.    Additional supportive care/discharge per primary  Metastatic adenocarcinoma (HCC)  Patient most recently received chemotherapy on 1/23.  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  Imaging currently showing progressive disease although this may be due to interruptions in her therapy.  Progressive back discomfort noted which I suspect may be related to her disease and possibly compression fracture.  No port in place.  Antibiotics discontinued above for now  Trend fever curve/vitals  Follow-up oncology evaluation  Continue PJP prophylaxis while on steroids  Monitor for new/developing symptoms on exam  History of pulmonary embolism  Diagnosed on last admission in December.  Likely in the setting of patient's metastatic cancer.  Ongoing anticoagulation as per primary.  Will follow-up CT PE negative for new  clots.  Additional care as above.  History of Pneumocystis jirovecii pneumonia  Diagnosed on last admission with patient's progressive respiratory symptoms despite antibiotics however course also complicated by possibility of progressing cancer as well as immunotherapy induced pneumonitis.  Patient completed 21-day course of empiric therapy.  She remains on at least 4 mg of Decadron a day.  Continue on Bactrim prophylaxis 3 times weekly  Maintain off other antibiotics as above  Monitor respiratory status  Trend fever curve/vitals  Can discontinue prophylaxis once patient is on less than 20 mg of prednisone equivalent daily    Above plan discussed in detail with the patient and her daughter at bedside  Above plan discussed in detail with primary service who is aware of plans for ongoing PJP prophylaxis and discontinuation of antibiotics otherwise    Given current stability, ID consult service will sign off at this time.  Please call if questions.    Antibiotics:  Off systemic antibiotic #2    24 Hour events:  Yesterday and overnight notes reviewed and no acute events noted    Subjective:  Patient seen earlier this morning and she denied having any nausea, vomiting, chest pain or shortness of breath.  Back pain slowly improving.  No other new symptoms.  Fevers overall resolved.  We discussed high likelihood of treatment related fever.  Again uncertain if her back pain may be due to compression fracture seen on the CT.  May also be due to progressing tumor burden which could also now be responding to treatment.  Patient was hopeful to go home tomorrow when she has support at home but otherwise we discussed at this point maintaining only on Bactrim prophylaxis and discontinuing other antibiotics.  Additional questions answered.    Objective:  Vitals:  Temp:  [98 °F (36.7 °C)-99 °F (37.2 °C)] 99 °F (37.2 °C)  HR:  [78-94] 94  Resp:  [16-18] 16  BP: (130-136)/(71-78) 133/75  SpO2:  [90 %-94 %] 90 %  Temp (24hrs), Av.6  °F (37 °C), Min:98 °F (36.7 °C), Max:99 °F (37.2 °C)  Current: Temperature: 99 °F (37.2 °C)    Physical Exam:   General Appearance:  Alert, interactive, nontoxic, no acute distress.   Throat: Oropharynx moist without lesions.    Lungs:   Clear to auscultation bilaterally; no wheezes, rhonchi or rales; respirations unlabored on room air   Heart:  RRR; no murmur, rub or gallop noted   Abdomen:   Soft, non-tender, non-distended, positive bowel sounds.     Extremities: No clubbing, cyanosis or edema   Skin: No new rashes or lesions. No new draining wounds noted.       Labs, Imaging, & Other studies:   All pertinent labs and imaging studies in PACS were personally reviewed as below.  Results from last 7 days   Lab Units 01/27/25  0041 01/26/25  0443 01/25/25  0401   WBC Thousand/uL 5.68 6.38 6.02   HEMOGLOBIN g/dL 8.2* 8.2* 9.7*   PLATELETS Thousands/uL 119* 127* 141*     Results from last 7 days   Lab Units 01/27/25  0041 01/26/25  0443 01/25/25  0401 01/24/25  0544 01/23/25  2145   POTASSIUM mmol/L 3.9   < > 4.4   < > 3.7   CHLORIDE mmol/L 105   < > 104   < > 107   CO2 mmol/L 27   < > 23   < > 23   BUN mg/dL 20   < > 16   < > 21   CREATININE mg/dL 0.88   < > 0.94   < > 0.98   EGFR ml/min/1.73sq m 64   < > 59   < > 57   CALCIUM mg/dL 8.7   < > 8.2*   < > 8.8   AST U/L 16  --  15  --  19   ALT U/L 15  --  15  --  15   ALK PHOS U/L 38  --  44  --  52    < > = values in this interval not displayed.     Results from last 7 days   Lab Units 01/24/25  0946 01/24/25  0313 01/23/25  2145   BLOOD CULTURE   --   --  No Growth at 72 hrs.  No Growth at 72 hrs.   MRSA CULTURE ONLY   --  No Methicillin Resistant Staphlyococcus aureus (MRSA) isolated  --    LEGIONELLA URINARY ANTIGEN  Negative  --   --        Lab interpretation/comments: No leukocytosis    Imaging interpretation/comments: No new    Culture data: Respiratory viral panel and blood cultures negative    External notes: None

## 2025-01-27 NOTE — ASSESSMENT & PLAN NOTE
3-day history of nonradiating low back pain, fever and chills.  Recent treatment for chemotherapy 1/23/2025.  CT abdomen pelvis show bilateral perinephric stranding, with right urothelial thickening, concerning for urinary tract infection   Severe sepsis criteria with oral temperature 101-102 °F, initial tachycardia 101, leukocytosis 2.36, bandemia 9%, calcitonin 0.35, lactic acidosis 2.3   Etiology of sepsis unclear, bacteremia vs carboplatin induced fever vs UTI vs pneumonitis.    Upon recent admission, patient was diagnosed with pneumocystis jiroveci  One dose of cefepime given in ED, now on Rocephin  CTA PE 1/24: 1. No evidence of acute pulmonary embolism. Stable tiny chronic embolus in a left lower lobe segmental artery. 2. Small bilateral pleural effusions. Otherwise no significant interval change in exam when compared to CT chest, abdomen, and pelvis examination from day prior.  Consult ID, recommendation appreciated  Oncology consulted, recommendation appreciated  Pro-Du trending down  Blood cultures negative at 72 hours  MRSA negative  Respiratory panel negative  Urine strep and Legionella negative  Strep PCR negative  UA unremarkable  Pro-Du trending down    Plan  Switched from cefepime to Ceftriaxone 1/24, added vancomycin for broader coverage for presumable bacteremia with unidentified source.  Consider PCP PCR if blood cultures come back negative  Antibiotics discontinued on 1/26/2025.  We will monitor of ABX for 24 hours.  Follow-up blood culture  Monitor vitals  Trend CBC/CMP

## 2025-01-27 NOTE — PROGRESS NOTES
Medical Oncology/Hematology Progress Note  Ayla Nye, female, 74 y.o., 1950,  S /S -01, [id number]     Assessment and Plan    1. Metastatic lung adenocarcinoma with disease progression  2. Fever, resolved  3. Generalized weakness, improved    Mrs. Nye is a 74 year old female with metastatic lung adenocarcinoma, currently on treatment with carboplatin and pemetrexed, with most recent treatment with cycle 3 on 1/23/25. With recent concern for pneumonitis, immunotherapy was discontinued after 2 cycles of chemoimmunotherapy. Patient was admitted for evaluation of fevers, progressive shortness of breath, and generalized weakness. Patient was febrile on admission, but cultures have all been negative thus far.  Imaging on admission has been notable for disease progression involving the lung nodules.  Although imaging had reported bilateral perinephric stranding, patient's UA and clinical symptoms are not suggestive of active UTI.    Fever likely 2/2 tumor progression.  Antibiotics have been discontinued in the absence of any clinical concern for infection and negative cultures.     Clinically, patient appears to be stable for discharge home from a medical oncology standpoint.  She will need to continue follow-up with her primary medical oncologist for discussion of future treatment options. Note that patient had only gotten 2 cycles of systemic treatment thus far (on 10/7/24 and 11/14/24) and several cycles needed to be held in setting of hospitalizations.  Cycle 3 of treatment was on 1/23/2025. Reasonable to continue current treatment regimen with carboplatin and pemetrexed for another 2 cycles to assess response in another 2 months. Otherwise, patient with limited efficacious treatment options left.     Patient has an ECOG performance status of 2. She has had multiple hospitalizations for symptoms of progressive dyspnea, fever, and fatigue. Would need to address goals of care with patient's  primary oncology team in future office visits.      Summary of recommendations:     Tumor progression noted. Patient to follow up with primary medical oncology team for discussion of future treatment recommendations and goals of care once medically stable for discharge      Outpatient follow up plan: Medical oncology f/u with Dr. Castro scheduled for 2/4/25 at [address] [place] [address]     Communication with team: Primary team resident was updated regarding the above recommendations    Communication with patient/family:  Patient was updated regarding above recommendations    Case was discussed with hematology/oncology attending, Dr. Kelley      Subjective:    Patient was seen at bedside this morning. She denied any recurrence of the fevers or any other acute complaints.  Infectious workup negative thus far. Antibiotics have been discontinued as per ID team recommendations.    Patient has been eating well.  And ambulating without any difficulty.    Review of Systems:    Review of Systems   Constitutional:  Negative for fatigue and fever.   Respiratory:  Negative for cough, shortness of breath and wheezing.    Cardiovascular:  Negative for chest pain, palpitations and leg swelling.   Genitourinary:  Negative for difficulty urinating and dysuria.   Neurological:  Negative for dizziness, light-headedness, numbness and headaches.   Hematological:  Negative for adenopathy. Does not bruise/bleed easily.   Psychiatric/Behavioral:  Negative for agitation and confusion.        Oncology History:   Cancer Staging   Metastatic adenocarcinoma (HCC)  Staging form: Lung, AJCC 8th Edition  - Clinical stage from 4/15/2024: Stage IIIB (cT2b, cN3, cM0) - Signed by Rogelio Beebe DO on 4/15/2024  - Clinical: Stage IV (cM1) - Signed by Honey Gamboa MD on 9/23/2024    Oncology History   Metastatic adenocarcinoma (HCC)   2024 Initial Diagnosis    Primary lung adenocarcinoma, right (HCC)     3/26/2024 Biopsy    A-C. Lymph Node,  Level 4R :    - Metastatic non-small cell carcinoma, most compatible with a primary lung adenocarcinoma; see note.       D-F. Lymph Node, Level 10R:    - Metastatic non-small cell carcinoma; see note.       G-I. Lymph Node, Level 4L:    - Metastatic non-small cell carcinoma; see note.       4/15/2024 -  Cancer Staged    Staging form: Lung, AJCC 8th Edition  - Clinical stage from 4/15/2024: Stage IIIB (cT2b, cN3, cM0) - Signed by Rogelio Beebe DO on 4/15/2024       5/23/2024 - 7/2/2024 Chemotherapy    alteplase (CATHFLO), 2 mg, Intracatheter, Every 1 Minute as needed, 6 of 6 cycles  CARBOplatin (PARAPLATIN) IVPB (GOG AUC DOSING), 130.4 mg, Intravenous, Once, 6 of 6 cycles  Administration: 130.4 mg (5/23/2024), 144.8 mg (5/30/2024), 138.4 mg (6/6/2024), 144.8 mg (6/13/2024), 132 mg (6/21/2024), 143.6 mg (7/2/2024)  PACLItaxel (TAXOL) chemo IVPB, 45 mg/m2 = 67.2 mg (90 % of original dose 50 mg/m2), Intravenous, Once, 6 of 6 cycles  Dose modification: 45 mg/m2 (original dose 50 mg/m2, Cycle 1, Reason: Anticipated Tolerance)  Administration: 67.2 mg (5/23/2024), 67.2 mg (5/30/2024), 67.2 mg (6/6/2024), 67.2 mg (6/13/2024), 67.2 mg (6/21/2024), 67.2 mg (7/2/2024)     5/23/2024 - 7/5/2024 Radiation    Treatment:  Course: C1    Plan ID Energy Fractions Dose per Fraction (cGy) Dose Correction (cGy) Total Dose Delivered (cGy) Elapsed Days   R Lung_Hilum 6X 30 / 30 200 0 6,000 43      Treatment dates:  C1: 5/23/2024 - 7/5/2024 8/8/2024 - 8/8/2024 Radiation    SRS 5 PTVs   2000 cGy     9/12/2024 Biopsy    Mass, Superficial perianal mass:  - Squamous cell carcinoma.     Note: The patient's prior lung EBUS sampling shows the tumor to stain for TTF1 and Napsin with absent p40 expression.  The current perianal mass sampling shows diffuse p40 expression with absent TTF1 expression.  This may represent a primary anal/perianal squamous cell carcinoma versus a possible metastatic combined lung tumor (adenocarcinoma and  previously unsampled squamous component).  Suggest clinical correlation and appropriate follow-up.         9/23/2024 -  Cancer Staged    Staging form: Lung, AJCC 8th Edition  - Clinical: Stage IV (cM1) - Signed by Honey Gamboa MD on 9/23/2024       10/1/2024 - 10/18/2024 Radiation    Course: C3    Plan ID Energy Fractions Dose per Fraction (cGy) Dose Correction (cGy) Total Dose Delivered (cGy) Elapsed Days   R Gluteus:1 10X/6X 14 / 15 300 0 4,200 17      Treatment Dates:  10/1/2024 - 10/18/2024.       10/7/2024 -  Chemotherapy    cyanocobalamin, 1,000 mcg, Intramuscular, Once, 2 of 3 cycles  Administration: 1,000 mcg (8/19/2024), 1,000 mcg (1/23/2025)  alteplase (CATHFLO), 2 mg, Intracatheter, Every 1 Minute as needed, 3 of 6 cycles  palonosetron (ALOXI), 0.25 mg, Intravenous, Once, 1 of 4 cycles  Administration: 0.25 mg (1/23/2025)  fosaprepitant (EMEND) IVPB, 150 mg, Intravenous, Once, 3 of 6 cycles  Administration: 150 mg (10/7/2024), 150 mg (1/23/2025), 150 mg (11/14/2024)  CARBOplatin (PARAPLATIN) IVPB (GOG AUC DOSING), 347 mg, Intravenous, Once, 3 of 6 cycles  Administration: 347 mg (10/7/2024), 256.8 mg (1/23/2025), 346 mg (11/14/2024)  bevacizumab (AVASTIN) IVPB, 5 mg/kg = 280 mg, Intravenous, Once, 1 of 4 cycles  Dose modification: 5 mg/kg (original dose 5 mg/kg, Cycle 4, Reason: Anticipated Tolerance)  pemetrexed (ALIMTA) chemo infusion, 735 mg, Intravenous, Once, 3 of 6 cycles  Administration: 700 mg (10/7/2024), 700 mg (1/23/2025), 700 mg (11/14/2024)  pembrolizumab (KEYTRUDA) IVPB, 200 mg, Intravenous, Once, 2 of 2 cycles  Administration: 200 mg (11/14/2024), 200 mg (10/7/2024)     Metastatic cancer to brain (HCC)   7/29/2024 Initial Diagnosis    Metastatic cancer to brain (HCC)     8/8/2024 - 8/8/2024 Radiation    SRS 5 PTVs   2000 cGy     9/12/2024 Biopsy    Mass, Superficial perianal mass:  - Squamous cell carcinoma.     Note: The patient's prior lung EBUS sampling shows the tumor to stain for  TTF1 and Napsin with absent p40 expression.  The current perianal mass sampling shows diffuse p40 expression with absent TTF1 expression.  This may represent a primary anal/perianal squamous cell carcinoma versus a possible metastatic combined lung tumor (adenocarcinoma and previously unsampled squamous component).  Suggest clinical correlation and appropriate follow-up.         10/1/2024 - 10/18/2024 Radiation    Course: C3    Plan ID Energy Fractions Dose per Fraction (cGy) Dose Correction (cGy) Total Dose Delivered (cGy) Elapsed Days   R Gluteus:1 10X/6X 14 / 15 300 0 4,200 17      Treatment Dates:  10/1/2024 - 10/18/2024.           Past Medical History:   Past Medical History:   Diagnosis Date    Allergic     Allergic to neomiacin and cephalexin    Cancer (HCC)     lung cancer    Cancer (HCC)     mets to brain    Cancer (HCC)     perianal mass    Cataract 2023    Due to have durgery 2024    Essential thrombocytosis (HCC)     controlled with medication    History of transfusion     Hyperlipidemia     Hypertension     Mass in chest     Nodular goiter     Osteoporosis     Peripheral neuropathy     Pneumonia Oct 16, 2023    Also  2024    Psoriasis     SIRS (systemic inflammatory response syndrome) (HCC) 2024       Past Surgical History:   Procedure Laterality Date    APPENDECTOMY      BREAST CYST EXCISION Right     COLONOSCOPY  10/31/2018    DXA PROCEDURE (HISTORICAL)  2017    IR BIOPSY OTHER  2024    IR LUMBAR PUNCTURE  2024    MAMMO (HISTORICAL)      18    MAMMO NEEDLE LOCALIZATION RIGHT (ALL INC) Right 2009    SKIN LESION EXCISION      bridge of nose       Family History   Problem Relation Age of Onset    Stroke Mother     Depression Mother             Hypertension Mother     Hearing loss Mother     Tuberculosis Father     Cancer Brother         lung    Tuberculosis Brother     Coronary artery disease Brother     Hypertension Brother     No Known  Problems Daughter     No Known Problems Daughter     No Known Problems Maternal Grandmother     No Known Problems Maternal Grandfather     No Known Problems Paternal Grandmother     No Known Problems Paternal Grandfather     No Known Problems Maternal Aunt     No Known Problems Maternal Aunt     No Known Problems Maternal Aunt     No Known Problems Maternal Aunt     No Known Problems Paternal Aunt     Cancer Brother         Throat canver       Social History     Socioeconomic History    Marital status:      Spouse name: None    Number of children: None    Years of education: None    Highest education level: None   Occupational History    None   Tobacco Use    Smoking status: Former     Current packs/day: 1.00     Average packs/day: 1 pack/day for 59.1 years (59.1 ttl pk-yrs)     Types: Cigarettes     Start date: 1966     Passive exposure: Past    Smokeless tobacco: Never    Tobacco comments:     Quit 2010   Vaping Use    Vaping status: Never Used   Substance and Sexual Activity    Alcohol use: Yes     Alcohol/week: 5.0 standard drinks of alcohol     Types: 5 Glasses of wine per week    Drug use: Never    Sexual activity: Not Currently     Partners: Male     Birth control/protection: Post-menopausal   Other Topics Concern    None   Social History Narrative    None     Social Drivers of Health     Financial Resource Strain: Low Risk  (8/14/2023)    Overall Financial Resource Strain (CARDIA)     Difficulty of Paying Living Expenses: Not hard at all   Food Insecurity: No Food Insecurity (12/31/2024)    Nursing - Inadequate Food Risk Classification     Worried About Running Out of Food in the Last Year: Never true     Ran Out of Food in the Last Year: Never true     Ran Out of Food in the Last Year: Never true   Transportation Needs: No Transportation Needs (12/31/2024)    Nursing - Transportation Risk Classification     Lack of Transportation: Not on file     Lack of Transportation: No   Physical Activity: Not  on file   Stress: Not on file   Social Connections: Not on file   Intimate Partner Violence: Unknown (2024)    Nursing IPS     Feels Physically and Emotionally Safe: Not on file     Physically Hurt by Someone: Not on file     Humiliated or Emotionally Abused by Someone: Not on file     Physically Hurt by Someone: No     Hurt or Threatened by Someone: No   Housing Stability: Unknown (2024)    Nursing: Inadequate Housing Risk Classification     Has Housing: Not on file     Worried About Losing Housing: Not on file     Unable to Get Utilities: Not on file     Unable to Pay for Housing in the Last Year: No     Has Housin         Current Facility-Administered Medications:     acetaminophen (TYLENOL) tablet 975 mg, 975 mg, Oral, Q6H PRN, Curtis Elizabeth MD, 975 mg at 25 0548    amLODIPine (NORVASC) tablet 5 mg, 5 mg, Oral, Daily, Curtis Elizabeth MD, 5 mg at 25 0854    cyanocobalamin (VITAMIN B-12) tablet 1,000 mcg, 1,000 mcg, Oral, Daily, Curtis Elizabeth MD, 1,000 mcg at 25 0853    dexamethasone (DECADRON) tablet 2 mg, 2 mg, Oral, BID, 2 mg at 25 0853 **FOLLOWED BY** [START ON 2025] dexamethasone (DECADRON) tablet 2 mg, 2 mg, Oral, Daily, Curtis Elizabeth MD    folic acid (FOLVITE) tablet 1 mg, 1 mg, Oral, Daily, Curtis Elizabeth MD, 1 mg at 25 0854    gabapentin (NEURONTIN) capsule 100 mg, 100 mg, Oral, TID, Curtis Elizabeth MD, 100 mg at 25 0854    gabapentin (NEURONTIN) capsule 100 mg, 100 mg, Oral, Once, Curtis Elizabeth MD    gabapentin (NEURONTIN) capsule 200 mg, 200 mg, Oral, HS, Curtis Elizabeth MD, 200 mg at 25 2217    levETIRAcetam (KEPPRA) tablet 1,000 mg, 1,000 mg, Oral, Q12H, Curtis Elizabeth MD, 1,000 mg at 25 1101    levothyroxine tablet 50 mcg, 50 mcg, Oral, Early Morning, Curtis Elizabeth MD, 50 mcg at 25 0549    pantoprazole (PROTONIX) EC tablet 40 mg, 40 mg, Oral, Early Morning, Curtis Elizabeth MD, 40 mg at 25 0548    rivaroxaban (XARELTO)  tablet 20 mg, 20 mg, Oral, Daily With Breakfast, Curtis Elizabeth MD, 20 mg at 01/27/25 0854    senna (SENOKOT) tablet 17.2 mg, 2 tablet, Oral, Daily, Curtis Elizabeth MD, 17.2 mg at 01/26/25 0908    sulfamethoxazole-trimethoprim (BACTRIM DS) 800-160 mg per tablet 1 tablet, 1 tablet, Oral, Once per day on Monday Wednesday Friday, Magnolia Stanton MD, 1 tablet at 01/27/25 0853      Medications Prior to Admission:     amLODIPine (NORVASC) 5 mg tablet    dexamethasone (DECADRON) 4 mg tablet    folic acid (FOLVITE) 1 mg tablet    gabapentin (NEURONTIN) 100 mg capsule    levETIRAcetam (KEPPRA) 1000 MG tablet    levothyroxine 50 mcg tablet    rivaroxaban (Xarelto) 20 mg tablet    senna (SENOKOT) 8.6 MG tablet    sulfamethoxazole-trimethoprim (BACTRIM DS) 800-160 mg per tablet    vitamin B-12 (VITAMIN B-12) 1,000 mcg tablet    pantoprazole (PROTONIX) 40 mg tablet    Probiotic Product (PROBIOTIC DAILY PO)    Allergies   Allergen Reactions    Doxycycline Headache    Keflex [Cephalexin] Rash    Neomycin-Polymyxin-Dexameth Eye Swelling         Physical Exam:     /71   Pulse 78   Temp 98.7 °F (37.1 °C)   Resp 18   SpO2 94%     Physical Exam  Vitals reviewed.   Constitutional:       General: She is not in acute distress.     Appearance: She is not ill-appearing.   HENT:      Head: Normocephalic and atraumatic.      Mouth/Throat:      Mouth: Mucous membranes are moist.      Pharynx: No oropharyngeal exudate or posterior oropharyngeal erythema.   Eyes:      General: No scleral icterus.     Extraocular Movements: Extraocular movements intact.      Conjunctiva/sclera: Conjunctivae normal.   Cardiovascular:      Rate and Rhythm: Normal rate and regular rhythm.   Pulmonary:      Effort: No respiratory distress.      Breath sounds: Normal breath sounds. No stridor.   Abdominal:      General: Bowel sounds are normal. There is no distension.      Palpations: Abdomen is soft. There is no mass.      Tenderness: There is no abdominal  tenderness.   Musculoskeletal:      Cervical back: Normal range of motion. No rigidity.      Right lower leg: No edema.      Left lower leg: No edema.   Lymphadenopathy:      Cervical: No cervical adenopathy.   Neurological:      Mental Status: She is alert and oriented to person, place, and time. Mental status is at baseline.       Recent Results (from the past 48 hours)   Vancomycin, random    Collection Time: 01/26/25  4:43 AM   Result Value Ref Range    Vancomycin Rm 12.6 10.0 - 20.0 ug/mL   Basic metabolic panel    Collection Time: 01/26/25  4:43 AM   Result Value Ref Range    Sodium 138 135 - 147 mmol/L    Potassium 4.2 3.5 - 5.3 mmol/L    Chloride 107 96 - 108 mmol/L    CO2 26 21 - 32 mmol/L    ANION GAP 5 4 - 13 mmol/L    BUN 18 5 - 25 mg/dL    Creatinine 0.84 0.60 - 1.30 mg/dL    Glucose 190 (H) 65 - 140 mg/dL    Calcium 8.3 (L) 8.4 - 10.2 mg/dL    eGFR 68 ml/min/1.73sq m   CBC and differential    Collection Time: 01/26/25  4:43 AM   Result Value Ref Range    WBC 6.38 4.31 - 10.16 Thousand/uL    RBC 2.73 (L) 3.81 - 5.12 Million/uL    Hemoglobin 8.2 (L) 11.5 - 15.4 g/dL    Hematocrit 26.2 (L) 34.8 - 46.1 %    MCV 96 82 - 98 fL    MCH 30.0 26.8 - 34.3 pg    MCHC 31.3 (L) 31.4 - 37.4 g/dL    RDW 15.7 (H) 11.6 - 15.1 %    MPV 9.9 8.9 - 12.7 fL    Platelets 127 (L) 149 - 390 Thousands/uL   Procalcitonin    Collection Time: 01/26/25  4:43 AM   Result Value Ref Range    Procalcitonin 6.10 (H) <=0.25 ng/ml   Manual Differential(PHLEBS Do Not Order)    Collection Time: 01/26/25  4:43 AM   Result Value Ref Range    Segmented % 92 (H) 43 - 75 %    Bands % 6 0 - 8 %    Lymphocytes % 1 (L) 14 - 44 %    Monocytes % 1 (L) 4 - 12 %    Eosinophils % 0 0 - 6 %    Basophils % 0 0 - 1 %    Absolute Neutrophils 6.25 1.85 - 7.62 Thousand/uL    Absolute Lymphocytes 0.06 (L) 0.60 - 4.47 Thousand/uL    Absolute Monocytes 0.06 0.00 - 1.22 Thousand/uL    Absolute Eosinophils 0.00 0.00 - 0.40 Thousand/uL    Absolute Basophils 0.00  0.00 - 0.10 Thousand/uL    Total Counted      RBC Morphology Present     Platelet Estimate Borderline (A) Adequate    Anisocytosis Present    Anna Marieed Niles    Collection Time: 01/26/25 12:16 PM   Result Value Ref Range    Supplier Name [employer]     Supplier Phone Number [phone]     Order Status Completed     Delivery Note       Please have Liaison delivery tomorrow prior to d/c.    Delivery Request Date 01/26/2025     Date Delivered  01/26/2025     Item Description Hung Cage, Adult    CBC and differential    Collection Time: 01/27/25 12:41 AM   Result Value Ref Range    WBC 5.68 4.31 - 10.16 Thousand/uL    RBC 2.64 (L) 3.81 - 5.12 Million/uL    Hemoglobin 8.2 (L) 11.5 - 15.4 g/dL    Hematocrit 25.4 (L) 34.8 - 46.1 %    MCV 96 82 - 98 fL    MCH 31.1 26.8 - 34.3 pg    MCHC 32.3 31.4 - 37.4 g/dL    RDW 15.7 (H) 11.6 - 15.1 %    MPV 9.0 8.9 - 12.7 fL    Platelets 119 (L) 149 - 390 Thousands/uL   Comprehensive metabolic panel    Collection Time: 01/27/25 12:41 AM   Result Value Ref Range    Sodium 139 135 - 147 mmol/L    Potassium 3.9 3.5 - 5.3 mmol/L    Chloride 105 96 - 108 mmol/L    CO2 27 21 - 32 mmol/L    ANION GAP 7 4 - 13 mmol/L    BUN 20 5 - 25 mg/dL    Creatinine 0.88 0.60 - 1.30 mg/dL    Glucose 95 65 - 140 mg/dL    Calcium 8.7 8.4 - 10.2 mg/dL    Corrected Calcium 9.4 8.3 - 10.1 mg/dL    AST 16 13 - 39 U/L    ALT 15 7 - 52 U/L    Alkaline Phosphatase 38 34 - 104 U/L    Total Protein 5.5 (L) 6.4 - 8.4 g/dL    Albumin 3.1 (L) 3.5 - 5.0 g/dL    Total Bilirubin 0.29 0.20 - 1.00 mg/dL    eGFR 64 ml/min/1.73sq m   Manual Differential(PHLEBS Do Not Order)    Collection Time: 01/27/25 12:41 AM   Result Value Ref Range    Segmented % 95 (H) 43 - 75 %    Lymphocytes % 2 (L) 14 - 44 %    Monocytes % 0 (L) 4 - 12 %    Eosinophils % 2 0 - 6 %    Basophils % 0 0 - 1 %    Plasma Cells % 1 (H) 0 - 0 %    Absolute Neutrophils 5.40 1.85 - 7.62 Thousand/uL    Absolute Lymphocytes 0.17 (L)  0.60 - 4.47 Thousand/uL    Absolute Monocytes 0.00 0.00 - 1.22 Thousand/uL    Absolute Eosinophils 0.11 0.00 - 0.40 Thousand/uL    Absolute Basophils 0.00 0.00 - 0.10 Thousand/uL    Total Counted      RBC Morphology Present     Platelet Estimate Adequate Adequate    Anisocytosis Present     Microcytes Present     Poikilocytes Present     Tear Drop Cells Present        CT chest abdomen pelvis w contrast  Result Date: 1/24/2025  Narrative: CT CHEST, ABDOMEN AND PELVIS WITH IV CONTRAST INDICATION: fever and bilateral lumbar/flank pain; sepsis. COMPARISON: CT 1/14/2025, 12/30/2024, 11/24/2024, 10/22/2024 TECHNIQUE: CT examination of the chest, abdomen and pelvis was performed. Multiplanar 2D reformatted images were created from the source data. This examination, like all CT scans performed in the [facility], was performed utilizing techniques to minimize radiation dose exposure, including the use of iterative reconstruction and automated exposure control. Radiation dose length product (DLP) for this visit: 315 mGy-cm IV Contrast: 90 mL of iohexol (OMNIPAQUE) Enteric Contrast: Not administered. FINDINGS: CHEST LUNGS: Right upper lobe mass measures 3.2 x 2.4 cm, shows progressive enlargement. There is currently more abutment with the adjacent mediastinum then when compared to the most recent prior. Parenchymal scarring in the right upper lobe and right lower lobe superior segments, likely radiation changes. 16 mm lingular nodule series 301/82, 8 mm on most recent prior 15 mm left lower lobe nodule series 301/82, 13 mm on most recent prior PLEURA: Unremarkable. HEART/GREAT VESSELS: Heart is unremarkable for patient's age. No thoracic aortic aneurysm. MEDIASTINUM AND SUDEEP: Unremarkable. CHEST WALL AND LOWER NECK: Unremarkable. ABDOMEN LIVER/BILIARY TREE: Unremarkable. GALLBLADDER: No calcified gallstones. No pericholecystic inflammatory change. SPLEEN: Unremarkable. PANCREAS: Unremarkable. ADRENAL GLANDS:  Unremarkable. KIDNEYS/URETERS: Right upper pole ill-defined hypoenhancing mass measures 5.5 x 4.2 cm on series 301/108, this measured as large as 4.5 cm on the most recent prior CT abdomen based off of my measurement Interval increase of bilateral perinephric stranding, even when compared to the most recent chest CT. There is mild right urothelial thickening in the renal pelvis STOMACH AND BOWEL: Colonic diverticulosis without findings of acute diverticulitis. APPENDIX: No findings to suggest appendicitis. ABDOMINOPELVIC CAVITY: 3.6 x 2.5 cm right ischiorectal fossa mass, series 301/227, decreased in size VESSELS: Unremarkable for patient's age. PELVIS REPRODUCTIVE ORGANS: Unremarkable for patient's age. URINARY BLADDER: Unremarkable. ABDOMINAL WALL/INGUINAL REGIONS: Unremarkable. BONES: Mild T5 superior endplate compression, in retrospect, there may have been a mild superior endplate compression on the most recent chest CT, which is now more pronounced, with surrounding sclerosis suggesting healing.     Impression: 1.  Findings consistent with disease progression, with rapid interval enlargement of left lung pulmonary nodules when compared to the CT 1/14/2025, increase in right upper lobe mass, and increase in right renal mass. There has been interval decrease in size of right ischiorectal fossa mass when compared to 11/24/2024. 2.  Bilateral perinephric stranding, with right urothelial thickening, concerning for urinary tract infection 3.  Mild T5 compression deformity, appears subacute The study was marked in EPIC for immediate notification. Workstation performed: [id number]     CT chest w contrast  Result Date: 1/14/2025  Narrative: CT CHEST WITH IV CONTRAST INDICATION: C34.11: Malignant neoplasm of upper lobe, right bronchus or lung. New onset back pain COMPARISON: Abdominal CT dated 12/31/2024 TECHNIQUE: CT examination of the chest was performed. Multiplanar 2D reformatted images were created from the source  data. This examination, like all CT scans performed in the [facility], was performed utilizing techniques to minimize radiation dose exposure, including the use of iterative reconstruction and automated exposure control. Radiation dose length product (DLP) for this visit: 157.4 mGy-cm IV Contrast: 85 mL of iohexol (OMNIPAQUE) FINDINGS: LUNGS: There are enlarging nodules in the left lower lobe and lingula including 8 mm lingula nodule on image 176 of series 3 which measured 5 mm previously. A bilobed nodule versus 2 adjacent nodules in the left lower lobe measure 8 x 13 mm on image 177 compared with 4 x 8 mm previously. Right upper lobe mass measures 2.5 cm AP by 2.5 cm transverse on image 61 of series 3, unchanged. Adjacent bandlike parenchymal opacity with involvement of the right upper and lower lobes appears similar and may reflect  posttreatment change. There is a background of mild emphysema. PLEURA: Unremarkable. HEART/GREAT VESSELS: Heart is unremarkable for patient's age. No thoracic aortic aneurysm. MEDIASTINUM AND SUDEEP: Low-attenuation soft tissue along the mediastinum on the right which is contiguous with the esophagus may reflect volume averaging with the adjacent azygos vein. CHEST WALL AND LOWER NECK: Unremarkable. VISUALIZED STRUCTURES IN THE UPPER ABDOMEN: Ill-defined mass along the upper pole of the right kidney measures 3.8 cm transverse by 2.9 cm AP on image 104 of series 2, similar to prior abdominal CT. Hepatic steatosis is noted. OSSEOUS STRUCTURES: No acute fracture or destructive osseous lesion.     Impression: 1. No evidence of osseous metastatic disease of the thoracic spine. 2. Worsening nodules in the left lower lobe and lingula compatible with metastatic disease. 3. Similar appearance of the posttreatment change in the right lung. 4. Low-attenuation soft tissue density along the right mediastinum appears slightly more pronounced but at least partially reflects the azygos  vein. Recommend attention to this area on follow-up. 5. Right upper renal mass is similar to recent abdominal CT. Primary or metastatic renal neoplasm suspected. Workstation performed: [id number]     CT abdomen w contrast  Result Date: 1/1/2025  Narrative: CT ABDOMEN WITH IV CONTRAST INDICATION: known malignancy. COMPARISON: 11/24/2024 TECHNIQUE: CT examination of the abdomen was performed after the administration of intravenous contrast. Multiplanar 2D reformatted images were created from the source data. This examination, like all CT scans performed in the [facility], was performed utilizing techniques to minimize radiation dose exposure, including the use of iterative reconstruction and automated exposure control. Radiation dose length product (DLP) for this visit: 214 mGy-cm IV Contrast: 85 mL of iohexol (OMNIPAQUE) Enteric Contrast: Not administered. FINDINGS: LOWER CHEST: Calcified left lower lobe pleural plaque. Cardiomegaly. LIVER/BILIARY TREE: Unremarkable. GALLBLADDER: No calcified gallstones. No pericholecystic inflammatory change. SPLEEN: Unremarkable. PANCREAS: Unremarkable. ADRENAL GLANDS: Unremarkable. KIDNEYS/VISUALIZED URETERS: Enlarging complex mass upper pole right kidney measuring 4.8 x 3.0 cm (previously 3.2 x 1.9 cm at a comparable level on exam from 11/24/2024). No hydronephrosis. Nonobstructing right nephrolithiasis. Scattered subcentimeter hypodensities arising from both kidneys, too small to characterize. STOMACH AND VISUALIZED BOWEL: Unremarkable. ABDOMINAL CAVITY: No ascites. No pneumoperitoneum. No lymphadenopathy. VESSELS: Atherosclerosis without abdominal aortic aneurysm. ABDOMINAL WALL: Unremarkable. BONES: No acute fracture or suspicious osseous lesion. Spinal degenerative changes.     Impression: 1.  Enlarging complex mass upper pole right kidney concerning for enlarging metastasis or primary renal neoplasia. 2.  No acute findings. See comments above for full  analysis. Workstation performed: [id number]     XR chest pa and lateral  Result Date: 12/31/2024  Narrative: XR CHEST PA AND LATERAL INDICATION: SOB. COMPARISON: Chest radiograph 12/1/2024 and concurrent CTA chest FINDINGS: Known right upper lobe mass is evident, though its full extent is better evident on prior CT. Increased right upper lobe atelectasis. Persistent opacities throughout the right upper lung. No pneumothorax or pleural effusion. Normal cardiomediastinal silhouette. Age-related degenerative changes in the spine. Normal upper abdomen..    Impression: Right upper lobe mass which is better characterized on concurrent CTA chest. Right upper lung atelectasis has increased since the 12/1/2024 radiograph. Opacities in the right upper lung could represent posttreatment change an/or tumor. Infection is to be  determined clinical grounds. Resident: ASYA CHANCE I, the attending radiologist, have reviewed the images and agree with the final report above. 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 COMPARISON: CTA PE study 11/24/2024. TECHNIQUE: CTA examination of the chest was performed using angiographic technique according to a protocol specifically tailored to evaluate for pulmonary embolism. Multiplanar 2D reformatted images were created from the source data. In addition, coronal  3D MIP postprocessing was performed on the acquisition scanner. Radiation dose length product (DLP) for this visit: 191 mGy-cm . This examination, like all CT scans performed in the [facility], was performed utilizing techniques to minimize radiation dose exposure, including the use of iterative reconstruction and automated exposure control. IV Contrast: 85 mL of  iohexol (OMNIPAQUE) FINDINGS: PULMONARY ARTERIAL TREE: Interval resolution of right middle lobe/right lower lobe pulmonary embolism. Interval near complete resolution of previously seen left lower lobe pulmonary embolism with questionable minimal residual linear thrombus in this region (301/109). LUNGS: Spiculated right upper lobe nodule measures 2.5 x 2.4 cm (3/62), slightly larger than on prior exam. Similar appearance of right upper lobe groundglass opacities with interval increase in opacities in the superior right lower lobe. Cluster of micronodules in the medial right upper lobe (304/) may be infectious or inflammatory. Additional groundglass and reticular opacities in the left upper lobe. New lingular 6 mm nodule (304/173) and left lower lobe 7 mm nodule (304/170). Mild emphysema. PLEURA: Left calcified basilar pleural plaque (301/158). HEART/GREAT VESSELS: Heart is normal in size. Coronary artery calcifications. No thoracic aortic aneurysm. MEDIASTINUM AND SUDEEP: Unremarkable. CHEST WALL AND LOWER NECK: Unremarkable. VISUALIZED STRUCTURES IN THE UPPER ABDOMEN: Few calculi within the partially imaged right kidney measuring up to 3 mm without hydronephrosis in the visualized portion of the right kidney. Exophytic lesion arising from the upper pole of the right kidney measuring approximately 2.6 cm (602/112) in the region in which metastasis was suspected on prior CT. OSSEOUS STRUCTURES: Degenerative changes of the spine.     Impression: No new pulmonary embolism. Resolution of previously seen right-sided pulmonary embolism. Near complete resolution of previously seen left lower lobe pulmonary embolism with questionable minimal residual linear thrombus in this region. Spiculated right upper lobe nodule measures slightly larger than on prior exam. Increase in right lung groundglass opacities which may represent worsening pneumonia and/or malignancy. Incompletely evaluated right upper pole lesion appears  larger than on prior exam, suspicious for metastasis. Dedicated abdominal imaging is recommended. Examination was marked in EPIC for immediate notification. Workstation performed: [id number]     MRI Brain BT w wo Contrast  Result Date: 12/29/2024  Narrative: MRI BRAIN WITH AND WITHOUT CONTRAST INDICATION: C79.31: Secondary malignant neoplasm of brain. COMPARISON: MRI brain 10/21/2024. MRI brain brain tumor protocol 10/8/2024. TECHNIQUE: Multiplanar, multisequence imaging of the brain and sella was performed before and after gadolinium administration. IV Contrast:  10 mL of Gadobutrol injection (SINGLE-DOSE) IMAGE QUALITY:   Motion-degraded, which reduces diagnostic sensitivity. FINDINGS: Multiple enhancing metastatic lesions as detailed below with comparison made to MRI brain tumor protocol 10/8/2024. 1. Stable tiny, subtle enhancing lesion in the left frontal lobe on series 13, image 121. 2. Increased size of a now 1.1 cm lesion in the left paramedian parietal lobe on series 13, image 105, previously 0.6 cm. 3. Decreased size of a now tiny lesion in the left paramedian frontal lobe on series 13, image 102 that previously measured 3 mm. 4. Increased size of a now 3.5 cm left occipital lesion (centrally hypoenhancing) on series 13, image 81 that previously measured 2.2 cm. 5. Left temporal lesion that was previously 4 mm is not as conspicuous/barely perceptible on the current study. 6. Decreased size of a now tiny lesion in the midline cerebellar vermis on series 13, image 47, previously 2 mm. There is a new lesion identified in the left frontal lobe on series 13, image 106 that measures 4 mm. There is slightly increased vasogenic edema in the left parietal and occipital lobes, when compared to the prior study, with mild mass effect on the left lateral ventricle. Slightly less conspicuous enhancement involving the bilateral cranial nerve VII and IACs. No acute infarct. Mild chronic ischemic changes of the white  matter. PERFUSION: While most of the lesions above are too small to characterize by perfusion, there is no appreciable elevated perfusion associated with the left occipital and left parietal lobe lesions. VENTRICLES: As above. No hydrocephalus. SELLA AND PITUITARY GLAND:  Normal. ORBITS: No acute abnormality. PARANASAL SINUSES: Trace mucosal thickening. VASCULATURE:  Evaluation of the major intracranial vasculature demonstrates appropriate flow voids. CALVARIUM AND SKULL BASE: No acute abnormality. EXTRACRANIAL SOFT TISSUES: No acute abnormality.     Impression: 1. New enhancing lesion in the left frontal lobe, which is suspicious for metastasis. 2. Increased size of left occipital and left paramedian parietal lobe lesions without appreciable elevated perfusion. Findings favor posttreatment sequelae/radiation effects though recommend continued attention on follow-up. Of note, there is slightly increased associated vasogenic edema in the left cerebral hemisphere, as described above. 3. The other lesions are stable to decreased/less conspicuous compared to the prior study. 4. 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 Recommend continued clinical and imaging surveillance. The study was marked in EPIC for significant notification. Workstation performed: [id number]       Labs and pertinent reports reviewed.

## 2025-01-27 NOTE — ASSESSMENT & PLAN NOTE
Diagnosed on last admission with patient's progressive respiratory symptoms despite antibiotics however course also complicated by possibility of progressing cancer as well as immunotherapy induced pneumonitis.  Patient completed 21-day course of empiric therapy.  She remains on at least 4 mg of Decadron a day.  Continue on Bactrim prophylaxis 3 times weekly  Maintain off other antibiotics as above  Monitor respiratory status  Trend fever curve/vitals  Can discontinue prophylaxis once patient is on less than 20 mg of prednisone equivalent daily

## 2025-01-27 NOTE — ASSESSMENT & PLAN NOTE
High fever, leukopenia tachycardia.  UA unremarkable.  Patient predominantly with persistent back pain in the mid back along with some discomfort in the shoulders tracking across.  She has no port or intravascular devices.  Imaging reviewed with progressive burden of disease.  Patient had chemotherapy on 1/23.  Workup with blood cultures, COVID/flu testing, RP 2 negative.  Back pain and fevers overall improving and I suspect they may be more so related to progressing disease and also compression fracture seen on imaging.  Fever likely also related to recent chemo session.  Given negative workup antibiotics discontinued.  Maintain off antibiotics  Continue PJP prophylaxis as below with Bactrim  Trend fever curve/WBC while admitted  Ongoing follow-up by oncology  Monitor for new/developing symptoms on exam.    Additional supportive care/discharge per primary

## 2025-01-27 NOTE — ASSESSMENT & PLAN NOTE
Diagnosed on last admission in December.  Likely in the setting of patient's metastatic cancer.  Ongoing anticoagulation as per primary.  Will follow-up CT PE negative for new clots.  Additional care as above.

## 2025-01-27 NOTE — ASSESSMENT & PLAN NOTE
Patient most recently received chemotherapy on 1/23.  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  Imaging currently showing progressive disease although this may be due to interruptions in her therapy.  Progressive back discomfort noted which I suspect may be related to her disease and possibly compression fracture.  No port in place.  Antibiotics discontinued above for now  Trend fever curve/vitals  Follow-up oncology evaluation  Continue PJP prophylaxis while on steroids  Monitor for new/developing symptoms on exam

## 2025-01-27 NOTE — PROGRESS NOTES
Progress Note - Hospitalist   Name: Ayla Nye 74 y.o. female I MRN: [id number]  Unit/Bed#: S -01 I Date of Admission: 1/23/2025   Date of Service: 1/27/2025 I Hospital Day: 3    Assessment & Plan  SIRS (systemic inflammatory response syndrome) (HCC)  3-day history of nonradiating low back pain, fever and chills.  Recent treatment for chemotherapy 1/23/2025.  CT abdomen pelvis show bilateral perinephric stranding, with right urothelial thickening, concerning for urinary tract infection   Severe sepsis criteria with oral temperature 101-102 °F, initial tachycardia 101, leukocytosis 2.36, bandemia 9%, calcitonin 0.35, lactic acidosis 2.3   Etiology of sepsis unclear, bacteremia vs carboplatin induced fever vs UTI vs pneumonitis.    Upon recent admission, patient was diagnosed with pneumocystis jiroveci  One dose of cefepime given in ED, now on Rocephin  CTA PE 1/24: 1. No evidence of acute pulmonary embolism. Stable tiny chronic embolus in a left lower lobe segmental artery. 2. Small bilateral pleural effusions. Otherwise no significant interval change in exam when compared to CT chest, abdomen, and pelvis examination from day prior.  Consult ID, recommendation appreciated  Oncology consulted, recommendation appreciated  Pro-Du trending down  Blood cultures negative at 72 hours  MRSA negative  Respiratory panel negative  Urine strep and Legionella negative  Strep PCR negative  UA unremarkable  Pro-Du trending down    Plan  Switched from cefepime to Ceftriaxone 1/24, added vancomycin for broader coverage for presumable bacteremia with unidentified source.  Consider PCP PCR if blood cultures come back negative  Antibiotics discontinued on 1/26/2025.  We will monitor of ABX for 24 hours.  Follow-up blood culture  Monitor vitals  Trend CBC/CMP  Metastatic adenocarcinoma (HCC)  Follows with Dr. Castro- medical oncology  Completed cycle 2 chemo on 11/14.   Infusion 1/23- carboplatin and pemetrexed, will  stop carboplatin  Will start on low dose Avastin.  SBRT treatment on 1/16  Next hem/onc appt 1/27  MRI ordered for 3/27.  Cancer associated pain  Follows with palliative care  Current regimen:  Gabapentin 100mg AM, 100mg at noon, 300mg Bedtime  Tylenol 650mg PRN  Heating pad during the day  Zofran for nausea  Failed therapies:  Celebrex 200mg BID  Oxycodone- felt like she had a headache from the medication  Would like to avoid opiates  Bowel regimen to prevent OIC:  Senna    History of pulmonary embolism  Dx during Nov/Dec admission   in the setting of malignancy  Continue Xarelto 20 mg QD  History of Pneumocystis jirovecii pneumonia  Diagnosed on last admission with patient's progressive respiratory symptoms despite antibiotics however course also complicated by possibility of progressing cancer as well as immunotherapy induced pneumonitis. Patient completed 21-day course of empiric therapy. She remains on at least 4 mg of Decadron a day.  Continue on Bactrim prophylaxis  Prophylaxis dosing adjusted for 3 times weekly  Continue antibiotics otherwise as above  Monitor respiratory status  Trend fever curve/vitals    VTE Pharmacologic Prophylaxis: VTE Score: 11 High Risk (Score >/= 5) - Pharmacological DVT Prophylaxis Ordered: rivaroxaban (Xarelto). Sequential Compression Devices Ordered.    Mobility:   Basic Mobility Inpatient Raw Score: 23  JH-HLM Goal: 7: Walk 25 feet or more  JH-HLM Achieved: 7: Walk 25 feet or more  JH-HLM Goal achieved. Continue to encourage appropriate mobility.    Patient Centered Rounds: I performed bedside rounds with nursing staff today.   Discussions with Specialists or Other Care Team Provider:     Education and Discussions with Family / Patient: Updated  (daughter) via phone.    Current Length of Stay: 3 day(s)  Current Patient Status: Inpatient   Certification Statement: The patient will continue to require additional inpatient hospital stay due to displacement    Discharge Plan: Anticipate discharge tomorrow to home.    Code Status: Level 1 - Full Code    Subjective   Patient was examined at bedside.  No events reported by nursing overnight.  Patient was alert and oriented x 3 and not in acute distress.  Patient is currently off oxygen supplement with oxygen saturation trending 93%.  Patient did not spike fever in the past 24 hours.  Patient reported chronic headaches that responses after drinking her morning coffee.  Patient denies dizziness, lightheadedness, cough, SOB, chest pain, abdominal pain, constipation or diarrhea, burning or pain while urination, lower leg edema.  Patient reported a visiting nurse, however she will be available until Tuesday she requested either to stay for 1 more night or assistance in that regards.      Objective :  Temp:  [97.8 °F (36.6 °C)-98.7 °F (37.1 °C)] 98.7 °F (37.1 °C)  HR:  [78-82] 78  BP: (123-136)/(70-78) 130/71  Resp:  [18] 18  SpO2:  [90 %-94 %] 94 %  O2 Device: None (Room air)    There is no height or weight on file to calculate BMI.     Input and Output Summary (last 24 hours):   No intake or output data in the 24 hours ending 01/27/25 1259      Physical Exam  Vitals and nursing note reviewed.   Constitutional:       General: She is not in acute distress.     Appearance: Normal appearance. She is well-developed. She is not ill-appearing.   HENT:      Head: Normocephalic and atraumatic.   Eyes:      General: No scleral icterus.     Conjunctiva/sclera: Conjunctivae normal.   Cardiovascular:      Rate and Rhythm: Normal rate and regular rhythm.      Pulses: Normal pulses.      Heart sounds: Normal heart sounds. No murmur heard.  Pulmonary:      Effort: Pulmonary effort is normal. No respiratory distress.      Breath sounds: Normal breath sounds. No wheezing.   Abdominal:      Palpations: Abdomen is soft.      Tenderness: There is no abdominal tenderness. There is no guarding.   Musculoskeletal:         General: No swelling.       Cervical back: Neck supple.      Right lower leg: No edema.      Left lower leg: No edema.   Skin:     General: Skin is warm and dry.      Capillary Refill: Capillary refill takes less than 2 seconds.      Findings: No lesion.   Neurological:      Mental Status: She is alert and oriented to person, place, and time.      Motor: No weakness.   Psychiatric:         Mood and Affect: Mood normal.         Lines/Drains:              Lab Results: I have reviewed the following results:   Results from last 7 days   Lab Units 01/27/25  0041 01/26/25  0443   WBC Thousand/uL 5.68 6.38   HEMOGLOBIN g/dL 8.2* 8.2*   HEMATOCRIT % 25.4* 26.2*   PLATELETS Thousands/uL 119* 127*   BANDS PCT %  --  6   LYMPHO PCT % 2* 1*   MONO PCT % 0* 1*   EOS PCT % 2 0     Results from last 7 days   Lab Units 01/27/25  0041   SODIUM mmol/L 139   POTASSIUM mmol/L 3.9   CHLORIDE mmol/L 105   CO2 mmol/L 27   BUN mg/dL 20   CREATININE mg/dL 0.88   ANION GAP mmol/L 7   CALCIUM mg/dL 8.7   ALBUMIN g/dL 3.1*   TOTAL BILIRUBIN mg/dL 0.29   ALK PHOS U/L 38   ALT U/L 15   AST U/L 16   GLUCOSE RANDOM mg/dL 95     Results from last 7 days   Lab Units 01/23/25  2145   INR  1.24*             Results from last 7 days   Lab Units 01/26/25  0443 01/25/25  0401 01/24/25  0544 01/24/25  0313 01/24/25  0009 01/23/25  2145   LACTIC ACID mmol/L  --   --   --  2.0 1.9 2.3*   PROCALCITONIN ng/ml 6.10* 13.09* 29.04*  --   --  8.35*       Recent Cultures (last 7 days):   Results from last 7 days   Lab Units 01/24/25  0946 01/23/25  2145   BLOOD CULTURE   --  No Growth at 72 hrs.  No Growth at 72 hrs.   LEGIONELLA URINARY ANTIGEN  Negative  --        Imaging Results Review: No pertinent imaging studies reviewed.  Other Study Results Review: No additional pertinent studies reviewed.    Last 24 Hours Medication List:     Current Facility-Administered Medications:     acetaminophen (TYLENOL) tablet 975 mg, Q6H PRN    amLODIPine (NORVASC) tablet 5 mg, Daily    cyanocobalamin  (VITAMIN B-12) tablet 1,000 mcg, Daily    dexamethasone (DECADRON) tablet 2 mg, BID **FOLLOWED BY** [START ON 1/29/2025] dexamethasone (DECADRON) tablet 2 mg, Daily    folic acid (FOLVITE) tablet 1 mg, Daily    gabapentin (NEURONTIN) capsule 100 mg, TID    gabapentin (NEURONTIN) capsule 100 mg, Once    gabapentin (NEURONTIN) capsule 200 mg, HS    levETIRAcetam (KEPPRA) tablet 1,000 mg, Q12H    levothyroxine tablet 50 mcg, Early Morning    pantoprazole (PROTONIX) EC tablet 40 mg, Early Morning    rivaroxaban (XARELTO) tablet 20 mg, Daily With Breakfast    senna (SENOKOT) tablet 17.2 mg, Daily    sulfamethoxazole-trimethoprim (BACTRIM DS) 800-160 mg per tablet 1 tablet, Once per day on Monday Wednesday Friday    Administrative Statements   Today, Patient Was Seen By: Greg Posey MD    **Please Note: This note may have been constructed using a voice recognition system.**

## 2025-01-28 VITALS
DIASTOLIC BLOOD PRESSURE: 90 MMHG | HEART RATE: 119 BPM | TEMPERATURE: 98.6 F | OXYGEN SATURATION: 92 % | SYSTOLIC BLOOD PRESSURE: 138 MMHG | RESPIRATION RATE: 16 BRPM

## 2025-01-28 PROBLEM — Z86.711 HISTORY OF PULMONARY EMBOLISM: Status: RESOLVED | Noted: 2024-12-31 | Resolved: 2025-01-28

## 2025-01-28 PROBLEM — R65.10 SIRS (SYSTEMIC INFLAMMATORY RESPONSE SYNDROME) (HCC): Status: RESOLVED | Noted: 2024-11-26 | Resolved: 2025-01-28

## 2025-01-28 LAB
ALBUMIN SERPL BCG-MCNC: 3.3 G/DL (ref 3.5–5)
ALP SERPL-CCNC: 40 U/L (ref 34–104)
ALT SERPL W P-5'-P-CCNC: 48 U/L (ref 7–52)
ANION GAP SERPL CALCULATED.3IONS-SCNC: 7 MMOL/L (ref 4–13)
AST SERPL W P-5'-P-CCNC: 42 U/L (ref 13–39)
BASOPHILS # BLD AUTO: 0.02 THOUSANDS/ΜL (ref 0–0.1)
BASOPHILS NFR BLD AUTO: 1 % (ref 0–1)
BILIRUB SERPL-MCNC: 0.35 MG/DL (ref 0.2–1)
BUN SERPL-MCNC: 20 MG/DL (ref 5–25)
CALCIUM ALBUM COR SERPL-MCNC: 9.3 MG/DL (ref 8.3–10.1)
CALCIUM SERPL-MCNC: 8.7 MG/DL (ref 8.4–10.2)
CHLORIDE SERPL-SCNC: 104 MMOL/L (ref 96–108)
CO2 SERPL-SCNC: 28 MMOL/L (ref 21–32)
CREAT SERPL-MCNC: 0.83 MG/DL (ref 0.6–1.3)
DME PARACHUTE DELIVERY DATE REQUESTED: NORMAL
DME PARACHUTE ITEM DESCRIPTION: NORMAL
DME PARACHUTE ORDER STATUS: NORMAL
DME PARACHUTE SUPPLIER NAME: NORMAL
DME PARACHUTE SUPPLIER PHONE: NORMAL
EOSINOPHIL # BLD AUTO: 0.03 THOUSAND/ΜL (ref 0–0.61)
EOSINOPHIL NFR BLD AUTO: 2 % (ref 0–6)
ERYTHROCYTE [DISTWIDTH] IN BLOOD BY AUTOMATED COUNT: 15.5 % (ref 11.6–15.1)
GFR SERPL CREATININE-BSD FRML MDRD: 69 ML/MIN/1.73SQ M
GLUCOSE SERPL-MCNC: 87 MG/DL (ref 65–140)
HCT VFR BLD AUTO: 26.7 % (ref 34.8–46.1)
HGB BLD-MCNC: 8.4 G/DL (ref 11.5–15.4)
IMM GRANULOCYTES # BLD AUTO: 0.04 THOUSAND/UL (ref 0–0.2)
IMM GRANULOCYTES NFR BLD AUTO: 2 % (ref 0–2)
LYMPHOCYTES # BLD AUTO: 0.15 THOUSANDS/ΜL (ref 0.6–4.47)
LYMPHOCYTES NFR BLD AUTO: 7 % (ref 14–44)
MCH RBC QN AUTO: 30.7 PG (ref 26.8–34.3)
MCHC RBC AUTO-ENTMCNC: 31.5 G/DL (ref 31.4–37.4)
MCV RBC AUTO: 97 FL (ref 82–98)
MONOCYTES # BLD AUTO: 0.05 THOUSAND/ΜL (ref 0.17–1.22)
MONOCYTES NFR BLD AUTO: 3 % (ref 4–12)
NEUTROPHILS # BLD AUTO: 1.74 THOUSANDS/ΜL (ref 1.85–7.62)
NEUTS SEG NFR BLD AUTO: 85 % (ref 43–75)
NRBC BLD AUTO-RTO: 0 /100 WBCS
PLATELET # BLD AUTO: 112 THOUSANDS/UL (ref 149–390)
PMV BLD AUTO: 9.2 FL (ref 8.9–12.7)
POTASSIUM SERPL-SCNC: 4.2 MMOL/L (ref 3.5–5.3)
PROT SERPL-MCNC: 5.8 G/DL (ref 6.4–8.4)
RBC # BLD AUTO: 2.74 MILLION/UL (ref 3.81–5.12)
SODIUM SERPL-SCNC: 139 MMOL/L (ref 135–147)
WBC # BLD AUTO: 2.03 THOUSAND/UL (ref 4.31–10.16)

## 2025-01-28 PROCEDURE — 85025 COMPLETE CBC W/AUTO DIFF WBC: CPT

## 2025-01-28 PROCEDURE — 80053 COMPREHEN METABOLIC PANEL: CPT

## 2025-01-28 PROCEDURE — 99238 HOSP IP/OBS DSCHRG MGMT 30/<: CPT | Performed by: FAMILY MEDICINE

## 2025-01-28 RX ADMIN — CYANOCOBALAMIN TAB 500 MCG 1000 MCG: 500 TAB at 09:22

## 2025-01-28 RX ADMIN — LEVOTHYROXINE SODIUM 50 MCG: 0.05 TABLET ORAL at 05:56

## 2025-01-28 RX ADMIN — DEXAMETHASONE 2 MG: 2 TABLET ORAL at 09:22

## 2025-01-28 RX ADMIN — PANTOPRAZOLE SODIUM 40 MG: 40 TABLET, DELAYED RELEASE ORAL at 05:56

## 2025-01-28 RX ADMIN — GABAPENTIN 100 MG: 100 CAPSULE ORAL at 09:22

## 2025-01-28 RX ADMIN — SENNOSIDES 17.2 MG: 8.6 TABLET, FILM COATED ORAL at 09:22

## 2025-01-28 RX ADMIN — LEVETIRACETAM 1000 MG: 500 TABLET, FILM COATED ORAL at 09:33

## 2025-01-28 RX ADMIN — RIVAROXABAN 20 MG: 20 TABLET, FILM COATED ORAL at 09:22

## 2025-01-28 RX ADMIN — FOLIC ACID 1 MG: 1 TABLET ORAL at 09:22

## 2025-01-28 RX ADMIN — AMLODIPINE BESYLATE 5 MG: 5 TABLET ORAL at 09:22

## 2025-01-28 NOTE — CASE MANAGEMENT
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     Palliative care patient 2024     Cancer associated pain 2024     Goals of care, counseling/discussion 2024     Right hip pain 09/10/2024     Altered mental status 2024     Hypothyroidism 2024     Abnormal imaging of central nervous system 2024     Acute metabolic encephalopathy 2024     Lung cancer metastatic to brain (HCC) 2024     Brain mass 2024     Metastatic cancer to brain (HCC) 2024     Visual disturbance 2024     Dehydration 2024     Moderate protein-calorie malnutrition (HCC) 2024     Bilateral leg pain 2024     Insomnia 2024     Metastatic adenocarcinoma (HCC) 2024     Chronic kidney disease, stage 3b (HCC) 2024     Age-related osteoporosis without current pathological fracture 2023     Encounter for monitoring of hydroxyurea therapy 2023     JAK2 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 2023     Hypertension 08/10/2022     Mixed hyperlipidemia 08/10/2022     Essential thrombocytosis (HCC) 2020     TB lung, 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     Psoriatic arthritis (HCC) 09/10/2019       LOS (days): 4  Geometric Mean LOS (GMLOS) (days): 3.5  Days to GMLOS:-1     OBJECTIVE:  Risk of Unplanned Readmission Score: 51.31      Current admission status: Inpatient   Preferred Pharmacy:   [id number] - [facility], [place] - [address]  [address]  [facility] [address]  Phone: [phone] Fax: [phone]    [facility] ([place]) - [place] - [address]  [address]  [place] [address]  Phone: [phone] Fax: [phone]    Primary Care Provider: Rafy Angelo MD    Primary Insurance: MEDICARE  Secondary Insurance: AARP    DISCHARGE DETAILS:    Discharge planning discussed with:: Patient    DME Referral Provided  Referral made for DME?: Yes  DME referral completed for the following items:: Bedside Commode  DME Supplier Name:: [employer]    Other Referral/Resources/Interventions Provided:  Interventions: DME  Referral Comments: CM ordered BSC in [place].    Treatment Team Recommendation: Home  Discharge Destination Plan:: Home  Transport at Discharge : Family (Patient's )    IMM Given (Date):: 01/28/25 (IMM was reviewed with patient at bedside. Patient verbalized understanding and was provided original. Copy placed in scan bin for patient's chart.)  IMM Given to:: Patient    IMM reviewed with patient, patient agrees with discharge determination.     CM ordered BSC in [place] (wheeled walker was ordered on Sunday in error). CM delivered to patient bedside and made aware that there is no co-pay. Patient signed delivery ticket, copy was given and other copy placed in [employer] folder for Narinder. Signed ticket uploaded into [place].

## 2025-01-28 NOTE — PLAN OF CARE
Problem: Potential for Falls  Goal: Patient will remain free of falls  Description: INTERVENTIONS:  - Educate patient/family on patient safety including physical limitations  - Instruct patient to call for assistance with activity   - Consult OT/PT to assist with strengthening/mobility   - Keep Call bell within reach  - Keep bed low and locked with side rails adjusted as appropriate  - Keep care items and personal belongings within reach  - Initiate and maintain comfort rounds  - Make Fall Risk Sign visible to staff  - Offer Toileting every  Hours, in advance of need  - Initiate/Maintain alarm  - Obtain necessary fall risk management equipment:   - Apply yellow socks and bracelet for high fall risk patients  - Consider moving patient to room near nurses station  Outcome: Progressing     Problem: SAFETY ADULT  Goal: Patient will remain free of falls  Description: INTERVENTIONS:  - Educate patient/family on patient safety including physical limitations  - Instruct patient to call for assistance with activity   - Consult OT/PT to assist with strengthening/mobility   - Keep Call bell within reach  - Keep bed low and locked with side rails adjusted as appropriate  - Keep care items and personal belongings within reach  - Initiate and maintain comfort rounds  - Make Fall Risk Sign visible to staff  - Offer Toileting every  Hours, in advance of need  - Initiate/Maintain alarm  - Obtain necessary fall risk management equipment:   - Apply yellow socks and bracelet for high fall risk patients  - Consider moving patient to room near nurses station  Outcome: Progressing  Goal: Maintain or return to baseline ADL function  Description: INTERVENTIONS:  -  Assess patient's ability to carry out ADLs; assess patient's baseline for ADL function and identify physical deficits which impact ability to perform ADLs (bathing, care of mouth/teeth, toileting, grooming, dressing, etc.)  - Assess/evaluate cause of self-care deficits   -  Assess range of motion  - Assess patient's mobility; develop plan if impaired  - Assess patient's need for assistive devices and provide as appropriate  - Encourage maximum independence but intervene and supervise when necessary  - Involve family in performance of ADLs  - Assess for home care needs following discharge   - Consider OT consult to assist with ADL evaluation and planning for discharge  - Provide patient education as appropriate  Outcome: Progressing  Goal: Maintains/Returns to pre admission functional level  Description: INTERVENTIONS:  - Perform AM-PAC 6 Click Basic Mobility/ Daily Activity assessment daily.  - Set and communicate daily mobility goal to care team and patient/family/caregiver.   - Collaborate with rehabilitation services on mobility goals if consulted  - Perform Range of Motion  times a day.  - Reposition patient every  hours.  - Dangle patient  times a day  - Stand patient  times a day  - Ambulate patient  times a day  - Out of bed to chair  times a day   - Out of bed for meals times a day  - Out of bed for toileting  - Record patient progress and toleration of activity level   Outcome: Progressing     Problem: DISCHARGE PLANNING  Goal: Discharge to home or other facility with appropriate resources  Description: INTERVENTIONS:  - Identify barriers to discharge w/patient and caregiver  - Arrange for needed discharge resources and transportation as appropriate  - Identify discharge learning needs (meds, wound care, etc.)  - Arrange for interpretive services to assist at discharge as needed  - Refer to Case Management Department for coordinating discharge planning if the patient needs post-hospital services based on physician/advanced practitioner order or complex needs related to functional status, cognitive ability, or social support system  Outcome: Progressing     Problem: Knowledge Deficit  Goal: Patient/family/caregiver demonstrates understanding of disease process, treatment plan,  medications, and discharge instructions  Description: Complete learning assessment and assess knowledge base.  Interventions:  - Provide teaching at level of understanding  - Provide teaching via preferred learning methods  Outcome: Progressing

## 2025-01-28 NOTE — PLAN OF CARE
Problem: Potential for Falls  Goal: Patient will remain free of falls  Description: INTERVENTIONS:  - Educate patient/family on patient safety including physical limitations  - Instruct patient to call for assistance with activity   - Consult OT/PT to assist with strengthening/mobility   - Keep Call bell within reach  - Keep bed low and locked with side rails adjusted as appropriate  - Keep care items and personal belongings within reach  - Initiate and maintain comfort rounds  - Make Fall Risk Sign visible to staff  - Offer Toileting every 2 Hours, in advance of need  - Initiate/Maintain bedalarm  - Obtain necessary fall risk management equipment:   - Apply yellow socks and bracelet for high fall risk patients  - Consider moving patient to room near nurses station  Outcome: Progressing

## 2025-01-29 ENCOUNTER — OFFICE VISIT (OUTPATIENT)
Dept: PULMONOLOGY | Facility: CLINIC | Age: 75
End: 2025-01-29
Payer: MEDICARE

## 2025-01-29 ENCOUNTER — TRANSITIONAL CARE MANAGEMENT (OUTPATIENT)
Age: 75
End: 2025-01-29

## 2025-01-29 VITALS
SYSTOLIC BLOOD PRESSURE: 116 MMHG | DIASTOLIC BLOOD PRESSURE: 64 MMHG | OXYGEN SATURATION: 97 % | TEMPERATURE: 97.2 F | HEART RATE: 87 BPM | BODY MASS INDEX: 22.31 KG/M2 | WEIGHT: 122 LBS

## 2025-01-29 DIAGNOSIS — B59 PNEUMONIA OF RIGHT UPPER LOBE DUE TO PNEUMOCYSTIS JIROVECII (HCC): ICD-10-CM

## 2025-01-29 DIAGNOSIS — R04.0 EPISTAXIS: Primary | ICD-10-CM

## 2025-01-29 LAB
BACTERIA BLD CULT: NORMAL
BACTERIA BLD CULT: NORMAL

## 2025-01-29 PROCEDURE — 99214 OFFICE O/P EST MOD 30 MIN: CPT | Performed by: INTERNAL MEDICINE

## 2025-01-29 PROCEDURE — G2211 COMPLEX E/M VISIT ADD ON: HCPCS | Performed by: INTERNAL MEDICINE

## 2025-01-29 RX ORDER — SULFAMETHOXAZOLE AND TRIMETHOPRIM 800; 160 MG/1; MG/1
1 TABLET ORAL 3 TIMES WEEKLY
Qty: 36 TABLET | Refills: 1 | Status: SHIPPED | OUTPATIENT
Start: 2025-01-29 | End: 2025-07-28

## 2025-01-29 NOTE — PROGRESS NOTES
Follow Up - Pulmonary Medicine   Ayla Nye 74 y.o. female MRN: [id number]      Reason for Consult: Lung Mass    Ayla Nye is a 74 y.o. female with a PMH of HTN, Latent TB(Treated INH 2022), Psoriatic Arthritis, CKD, Thrombocytosis(JAK2) who presents for lung mass.    Lung AdenoCx, metastatic (to kidneys)  PJP PNA      Stage IIIB -initially later with mets  Started On Carboplatin/Paclitaxel and got radiation in July-Sept. On keytruda, stopped in December  In December admitted with new URL infiltrate. Bal with negative bacterial culture, + PCP, likley also component of progression of disease  - sp  PCP treatment for 3 weeks, continue prophylactic dose Bactrim per ID  - oncology follow up  - continue decadron taper per onc    Epistaxis  Mild, self limiting. Can be triggered by dry air  - start nasal saline spray daily  - humidifier  - continue xarelto for now    PE  Acute PE in Nov/December admission  - continue AC indefinitely in setting of Ca    6 month follow up    _____________________________________________________________________  Interval Hx 01/29/25:   Recent admission in Dec for SOB and RUL infiltrate. + PJP and possible radiation  Prednisone taper and treatment dose atovaquone, completed 3 weeks terapy    Admitted again 1/23-1/28 for sepsis, fevers though to be due to disease progression    Reports some mild epistaxis, resolved on its own  No hemoptysis, minimal cough    Does report CAIN    HPI:    Ayla Nye is a 74 y.o. female with a PMH of HTN, Latent TB(Treated INH 2022), Psoriatic Arthritis, CKD, Thrombocytosis(JAK2) who presents for lung mass.    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  None    Imaging:  I personally reviewed the images on the PAC system pertinent to today's visit  CT Chest July 2024:  1. 4.1 x 3.4 cm right upper lobe mass. Differential considerations include bronchogenic malignancy or pneumonia. If the patient does not have signs of pulmonary infection, FDG PET/CT and tissue sampling is recommended for further evaluation. If the   patient does have clinical signs of pulmonary infection, recommend treatment and follow-up CT in approximately 2 months for reassessment.     CT Chest 1/2025: Findings consistent with disease progression, with rapid interval enlargement of left lung pulmonary nodules when compared to the CT 1/14/2025, increase in right upper lobe mass, and increase in right renal mass. There has been interval decrease in   size of right ischiorectal fossa mass when compared to 11/24/2024.  2.  Bilateral perinephric stranding, with right urothelial thickening, concerning for urinary tract infection  3.  Mild T5 compression deformity, appears subacute       Other studies:  Lymph Node 3/26      A-C. Lymph Node, Level 4R (ThinPrep, smears and cell block preparations):    - Metastatic non-small cell carcinoma, most compatible with a primary lung adenocarcinoma; see note.    - Satisfactory for evaluation.     D-F. Lymph Node, Level 10R (ThinPrep, smears and cell block preparations):    - Metastatic non-small cell carcinoma; see note.    - Satisfactory for evaluation.     G-I. Lymph Node, Level 4L (ThinPrep, smears and cell block preparations):    - Metastatic non-small cell carcinoma; see note.    - Satisfactory for evaluation.    PhysicalExamination:  Vitals:   /64   Pulse 87   Temp (!) 97.2 °F (36.2 °C)   Wt 55.3 kg (122 lb)   SpO2 97%   BMI 22.31 kg/m²       Appearance -- NAD, speaking full sentences  Neuro -- A&Ox3  Neck -- no JVD  Heart -- RRR, no murmurs  Lungs -- CTAB  Abdomen -- soft, NTND  Extremities -- WWP, no LE edema  Skin -- no  rash    Review of Systems:  Aside from what is mentioned in the HPI, the review of systems otherwise negative.    Immunization History   Administered Date(s) Administered    COVID-19 PFIZER VACCINE 0.3 ML IM 02/17/2021, 03/09/2021, 11/15/2021, 11/27/2021    INFLUENZA 10/10/2014, 10/14/2015, 10/26/2017, 10/08/2018, 10/17/2019, 10/12/2021    Influenza Split High Dose Preservative Free IM 09/24/2024    Influenza, high dose seasonal 0.7 mL 10/12/2022, 09/27/2023    Pneumococcal Conjugate 13-Valent 01/16/2017    Pneumococcal Conjugate Vaccine 20-valent (Pcv20), Polysace 11/27/2024    Pneumococcal Polysaccharide PPV23 01/18/2018    Td (adult), adsorbed 12/24/2015, 10/27/2021    Zoster 01/31/2014        Current Medications:    Current Outpatient Medications:     amLODIPine (NORVASC) 5 mg tablet, TAKE 1 TABLET(5 MG) BY MOUTH DAILY, Disp: 30 tablet, Rfl: 5    dexamethasone (DECADRON) 4 mg tablet, Take 1 tablet (4 mg total) by mouth every 8 (eight) hours for 5 days, THEN 1 tablet (4 mg total) every 12 (twelve) hours for 7 days, THEN 1.5 tablets (6 mg total) in the morning for 7 days, THEN 0.5 tablets (2 mg total) 2 (two) times a day for 7 days, THEN 0.5 tablets (2 mg total) in the morning for 7 days. When you get to the 6 mg dosage please take 4 mg in the morning followed by 2 mg in the evening; that would be 1 tablet in the morning followed by 1/2 tablet in the evening for that week.., Disp: 50 tablet, Rfl: 0    folic acid (FOLVITE) 1 mg tablet, Take 1 tablet (1 mg total) by mouth daily, Disp: 30 tablet, Rfl: 6    gabapentin (NEURONTIN) 100 mg capsule, TAKE 1 CAPSULE BY MOUTH EVERY MORNING, 1 CAPSULE EVERY AFTERNOON AND 3 CAPSULES EVERY NIGHT AT BEDTIME, Disp: 150 capsule, Rfl: 5    levETIRAcetam (KEPPRA) 1000 MG tablet, TAKE 1 TABLET(1000 MG) BY MOUTH EVERY 12 HOURS, Disp: 60 tablet, Rfl: 1    levothyroxine 50 mcg tablet, TAKE 1 TABLET(50 MCG) BY MOUTH DAILY EARLY MORNING, Disp: 30 tablet, Rfl: 1    pantoprazole (PROTONIX)  40 mg tablet, Take 1 tablet (40 mg total) by mouth daily in the early morning, Disp: 30 tablet, Rfl: 5    rivaroxaban (Xarelto) 20 mg tablet, Take 1 tablet (20 mg total) by mouth daily with breakfast, Disp: 30 tablet, Rfl: 0    senna (SENOKOT) 8.6 MG tablet, Take 1 tablet by mouth daily Takes one every other day, Disp: , Rfl:     sulfamethoxazole-trimethoprim (BACTRIM DS) 800-160 mg per tablet, Take 1 tablet by mouth daily Starting 12/24, you can take one tablet Monday, Wednesday, and Fridays. Do not start before December 24, 2024., Disp: 36 tablet, Rfl: 4    vitamin B-12 (VITAMIN B-12) 1,000 mcg tablet, Take 1 tablet (1,000 mcg total) by mouth daily, Disp: 90 tablet, Rfl: 1  No current facility-administered medications for this visit.    Historical Information   Past Medical History:   Diagnosis Date    Allergic 2022    Allergic to neomiacin and cephalexin    Cancer (HCC)     lung cancer    Cancer (HCC)     mets to brain    Cancer (HCC)     perianal mass    Cataract Nov. 2023    Due to have durgery June 2024    Essential thrombocytosis (HCC)     controlled with medication    History of transfusion     Hyperlipidemia     Hypertension     Mass in chest     Nodular goiter     Osteoporosis     Peripheral neuropathy     Pneumonia Oct 16, 2023    Also  Feb 2024    Psoriasis     SIRS (systemic inflammatory response syndrome) (HCC) 06/22/2024     Past Surgical History:   Procedure Laterality Date    APPENDECTOMY      BREAST CYST EXCISION Right 2009    COLONOSCOPY  10/31/2018    DXA PROCEDURE (HISTORICAL)  04/21/2017    IR BIOPSY OTHER  9/12/2024    IR LUMBAR PUNCTURE  9/12/2024    MAMMO (HISTORICAL)      8-24-18    MAMMO NEEDLE LOCALIZATION RIGHT (ALL INC) Right 07/13/2009    SKIN LESION EXCISION      bridge of nose     Social History   Social History     Tobacco Use   Smoking Status Former    Current packs/day: 1.00    Average packs/day: 1 pack/day for 59.1 years (59.1 ttl pk-yrs)    Types: Cigarettes    Start date: 1966  "   Passive exposure: Past   Smokeless Tobacco Never   Tobacco Comments    Quit 2010           Diagnostic Data:  Labs:  I personally reviewed the most recent laboratory data pertinent to today's visit    Lab Results   Component Value Date    WBC 2.03 (L) 01/28/2025    HGB 8.4 (L) 01/28/2025    HCT 26.7 (L) 01/28/2025    MCV 97 01/28/2025     (L) 01/28/2025     Lab Results   Component Value Date    CALCIUM 8.7 01/28/2025    K 4.2 01/28/2025    CO2 28 01/28/2025     01/28/2025    BUN 20 01/28/2025    CREATININE 0.83 01/28/2025     No results found for: \"IGE\"  Lab Results   Component Value Date    ALT 48 01/28/2025    AST 42 (H) 01/28/2025    ALKPHOS 40 01/28/2025           I have spent a total time of 30 minutes on 01/29/25 in caring for this patient including Diagnostic results, Prognosis, Risks and benefits of tx options, Instructions for management, Patient and family education, Importance of tx compliance, Risk factor reductions, Impressions, Counseling / Coordination of care, Documenting in the medical record, Reviewing / ordering tests, medicine, procedures  , Obtaining or reviewing history  , and Communicating with other healthcare professionals .   _        "

## 2025-01-30 PROBLEM — B59 PCP (PNEUMOCYSTIS JIROVECI PNEUMONIA) (HCC): Status: RESOLVED | Noted: 2024-12-31 | Resolved: 2025-01-30

## 2025-02-03 ENCOUNTER — HOSPITAL ENCOUNTER (INPATIENT)
Facility: HOSPITAL | Age: 75
LOS: 1 days | Discharge: HOME | DRG: 150 | End: 2025-02-04
Attending: EMERGENCY MEDICINE | Admitting: INTERNAL MEDICINE
Payer: MEDICARE

## 2025-02-03 ENCOUNTER — APPOINTMENT (EMERGENCY)
Dept: RADIOLOGY | Facility: HOSPITAL | Age: 75
DRG: 150 | End: 2025-02-03
Payer: MEDICARE

## 2025-02-03 ENCOUNTER — APPOINTMENT (EMERGENCY)
Dept: CT IMAGING | Facility: HOSPITAL | Age: 75
DRG: 150 | End: 2025-02-03
Payer: MEDICARE

## 2025-02-03 DIAGNOSIS — C34.90 ADENOCARCINOMA, LUNG (HCC): ICD-10-CM

## 2025-02-03 DIAGNOSIS — C79.9 METASTATIC ADENOCARCINOMA (HCC): ICD-10-CM

## 2025-02-03 DIAGNOSIS — R53.1 GENERALIZED WEAKNESS: Primary | ICD-10-CM

## 2025-02-03 DIAGNOSIS — I26.99 BILATERAL PULMONARY EMBOLISM (HCC): ICD-10-CM

## 2025-02-03 DIAGNOSIS — C79.31 METASTATIC CANCER TO BRAIN (HCC): ICD-10-CM

## 2025-02-03 DIAGNOSIS — C49.5: ICD-10-CM

## 2025-02-03 DIAGNOSIS — R26.2 AMBULATORY DYSFUNCTION: ICD-10-CM

## 2025-02-03 DIAGNOSIS — R41.0 CONFUSION AND DISORIENTATION: ICD-10-CM

## 2025-02-03 DIAGNOSIS — R04.0 EPISTAXIS: ICD-10-CM

## 2025-02-03 LAB
ALBUMIN SERPL BCG-MCNC: 3.7 G/DL (ref 3.5–5)
ALP SERPL-CCNC: 45 U/L (ref 34–104)
ALT SERPL W P-5'-P-CCNC: 19 U/L (ref 7–52)
ANION GAP SERPL CALCULATED.3IONS-SCNC: 7 MMOL/L (ref 4–13)
ANISOCYTOSIS BLD QL SMEAR: PRESENT
APTT PPP: 42 SECONDS (ref 23–34)
AST SERPL W P-5'-P-CCNC: 13 U/L (ref 13–39)
ATRIAL RATE: 80 BPM
BACTERIA UR QL AUTO: ABNORMAL /HPF
BASOPHILS # BLD MANUAL: 0 THOUSAND/UL (ref 0–0.1)
BASOPHILS NFR MAR MANUAL: 0 % (ref 0–1)
BILIRUB DIRECT SERPL-MCNC: 0.09 MG/DL (ref 0–0.2)
BILIRUB SERPL-MCNC: 0.27 MG/DL (ref 0.2–1)
BILIRUB UR QL STRIP: NEGATIVE
BUN SERPL-MCNC: 15 MG/DL (ref 5–25)
CALCIUM SERPL-MCNC: 8.8 MG/DL (ref 8.4–10.2)
CARDIAC TROPONIN I PNL SERPL HS: 3 NG/L (ref ?–50)
CHLORIDE SERPL-SCNC: 106 MMOL/L (ref 96–108)
CLARITY UR: CLEAR
CO2 SERPL-SCNC: 25 MMOL/L (ref 21–32)
COLOR UR: YELLOW
CREAT SERPL-MCNC: 0.96 MG/DL (ref 0.6–1.3)
EOSINOPHIL # BLD MANUAL: 0 THOUSAND/UL (ref 0–0.4)
EOSINOPHIL NFR BLD MANUAL: 0 % (ref 0–6)
ERYTHROCYTE [DISTWIDTH] IN BLOOD BY AUTOMATED COUNT: 15.3 % (ref 11.6–15.1)
FLUAV AG UPPER RESP QL IA.RAPID: NEGATIVE
FLUBV AG UPPER RESP QL IA.RAPID: NEGATIVE
GFR SERPL CREATININE-BSD FRML MDRD: 58 ML/MIN/1.73SQ M
GLUCOSE SERPL-MCNC: 99 MG/DL (ref 65–140)
GLUCOSE UR STRIP-MCNC: NEGATIVE MG/DL
HCT VFR BLD AUTO: 25.4 % (ref 34.8–46.1)
HGB BLD-MCNC: 8 G/DL (ref 11.5–15.4)
HGB UR QL STRIP.AUTO: ABNORMAL
HYALINE CASTS #/AREA URNS LPF: ABNORMAL /LPF
INR PPP: 1.91 (ref 0.85–1.19)
KETONES UR STRIP-MCNC: NEGATIVE MG/DL
LEUKOCYTE ESTERASE UR QL STRIP: NEGATIVE
LYMPHOCYTES # BLD AUTO: 0.27 THOUSAND/UL (ref 0.6–4.47)
LYMPHOCYTES # BLD AUTO: 7 % (ref 14–44)
MAGNESIUM SERPL-MCNC: 2 MG/DL (ref 1.9–2.7)
MCH RBC QN AUTO: 29.9 PG (ref 26.8–34.3)
MCHC RBC AUTO-ENTMCNC: 31.5 G/DL (ref 31.4–37.4)
MCV RBC AUTO: 95 FL (ref 82–98)
MONOCYTES # BLD AUTO: 0.27 THOUSAND/UL (ref 0–1.22)
MONOCYTES NFR BLD: 7 % (ref 4–12)
NEUTROPHILS # BLD MANUAL: 3.34 THOUSAND/UL (ref 1.85–7.62)
NEUTS BAND NFR BLD MANUAL: 2 % (ref 0–8)
NEUTS SEG NFR BLD AUTO: 84 % (ref 43–75)
NITRITE UR QL STRIP: NEGATIVE
NON-SQ EPI CELLS URNS QL MICRO: ABNORMAL /HPF
P AXIS: 69 DEGREES
PH UR STRIP.AUTO: 6 [PH]
PLATELET # BLD AUTO: 130 THOUSANDS/UL (ref 149–390)
PLATELET BLD QL SMEAR: ADEQUATE
PMV BLD AUTO: 10.1 FL (ref 8.9–12.7)
POTASSIUM SERPL-SCNC: 4.6 MMOL/L (ref 3.5–5.3)
PR INTERVAL: 138 MS
PROT SERPL-MCNC: 6.6 G/DL (ref 6.4–8.4)
PROT UR STRIP-MCNC: ABNORMAL MG/DL
PROTHROMBIN TIME: 22.6 SECONDS (ref 12.3–15)
QRS AXIS: 37 DEGREES
QRSD INTERVAL: 66 MS
QT INTERVAL: 382 MS
QTC INTERVAL: 440 MS
RBC # BLD AUTO: 2.68 MILLION/UL (ref 3.81–5.12)
RBC #/AREA URNS AUTO: ABNORMAL /HPF
RBC MORPH BLD: PRESENT
SARS-COV+SARS-COV-2 AG RESP QL IA.RAPID: NEGATIVE
SODIUM SERPL-SCNC: 138 MMOL/L (ref 135–147)
SP GR UR STRIP.AUTO: 1.03 (ref 1–1.03)
T WAVE AXIS: 46 DEGREES
UROBILINOGEN UR STRIP-ACNC: <2 MG/DL
VENTRICULAR RATE: 80 BPM
WBC # BLD AUTO: 3.88 THOUSAND/UL (ref 4.31–10.16)
WBC #/AREA URNS AUTO: ABNORMAL /HPF

## 2025-02-03 PROCEDURE — 84484 ASSAY OF TROPONIN QUANT: CPT

## 2025-02-03 PROCEDURE — 93005 ELECTROCARDIOGRAM TRACING: CPT

## 2025-02-03 PROCEDURE — 83735 ASSAY OF MAGNESIUM: CPT

## 2025-02-03 PROCEDURE — 093K7ZZ CONTROL BLEEDING IN NASAL MUCOSA AND SOFT TISSUE, VIA NATURAL OR ARTIFICIAL OPENING: ICD-10-PCS | Performed by: DENTIST

## 2025-02-03 PROCEDURE — 36415 COLL VENOUS BLD VENIPUNCTURE: CPT

## 2025-02-03 PROCEDURE — 85610 PROTHROMBIN TIME: CPT

## 2025-02-03 PROCEDURE — 85027 COMPLETE CBC AUTOMATED: CPT

## 2025-02-03 PROCEDURE — 85730 THROMBOPLASTIN TIME PARTIAL: CPT

## 2025-02-03 PROCEDURE — 87804 INFLUENZA ASSAY W/OPTIC: CPT

## 2025-02-03 PROCEDURE — 99223 1ST HOSP IP/OBS HIGH 75: CPT | Performed by: INTERNAL MEDICINE

## 2025-02-03 PROCEDURE — 80048 BASIC METABOLIC PNL TOTAL CA: CPT

## 2025-02-03 PROCEDURE — 71045 X-RAY EXAM CHEST 1 VIEW: CPT

## 2025-02-03 PROCEDURE — 93010 ELECTROCARDIOGRAM REPORT: CPT | Performed by: INTERNAL MEDICINE

## 2025-02-03 PROCEDURE — 80076 HEPATIC FUNCTION PANEL: CPT

## 2025-02-03 PROCEDURE — 85007 BL SMEAR W/DIFF WBC COUNT: CPT

## 2025-02-03 PROCEDURE — 81001 URINALYSIS AUTO W/SCOPE: CPT

## 2025-02-03 PROCEDURE — 70450 CT HEAD/BRAIN W/O DYE: CPT

## 2025-02-03 PROCEDURE — 87811 SARS-COV-2 COVID19 W/OPTIC: CPT

## 2025-02-03 PROCEDURE — 99285 EMERGENCY DEPT VISIT HI MDM: CPT

## 2025-02-03 RX ORDER — ECHINACEA PURPUREA EXTRACT 125 MG
2 TABLET ORAL
Status: DISCONTINUED | OUTPATIENT
Start: 2025-02-03 | End: 2025-02-04 | Stop reason: HOSPADM

## 2025-02-03 RX ORDER — GABAPENTIN 300 MG/1
300 CAPSULE ORAL
Status: DISCONTINUED | OUTPATIENT
Start: 2025-02-03 | End: 2025-02-04 | Stop reason: HOSPADM

## 2025-02-03 RX ORDER — ACETAMINOPHEN 325 MG/1
650 TABLET ORAL EVERY 6 HOURS PRN
Status: DISCONTINUED | OUTPATIENT
Start: 2025-02-03 | End: 2025-02-04 | Stop reason: HOSPADM

## 2025-02-03 RX ORDER — SULFAMETHOXAZOLE AND TRIMETHOPRIM 800; 160 MG/1; MG/1
1 TABLET ORAL 3 TIMES WEEKLY
Status: DISCONTINUED | OUTPATIENT
Start: 2025-02-05 | End: 2025-02-04 | Stop reason: HOSPADM

## 2025-02-03 RX ORDER — PANTOPRAZOLE SODIUM 40 MG/1
40 TABLET, DELAYED RELEASE ORAL DAILY
Status: DISCONTINUED | OUTPATIENT
Start: 2025-02-04 | End: 2025-02-04 | Stop reason: HOSPADM

## 2025-02-03 RX ORDER — AMLODIPINE BESYLATE 2.5 MG/1
5 TABLET ORAL DAILY
Status: DISCONTINUED | OUTPATIENT
Start: 2025-02-04 | End: 2025-02-04 | Stop reason: HOSPADM

## 2025-02-03 RX ORDER — DEXAMETHASONE 2 MG/1
4 TABLET ORAL EVERY 12 HOURS SCHEDULED
Status: DISCONTINUED | OUTPATIENT
Start: 2025-02-03 | End: 2025-02-04 | Stop reason: HOSPADM

## 2025-02-03 RX ORDER — SENNOSIDES 8.6 MG
1 TABLET ORAL DAILY
Status: DISCONTINUED | OUTPATIENT
Start: 2025-02-03 | End: 2025-02-04 | Stop reason: HOSPADM

## 2025-02-03 RX ORDER — LEVETIRACETAM 500 MG/1
1000 TABLET ORAL 2 TIMES DAILY
Status: DISCONTINUED | OUTPATIENT
Start: 2025-02-03 | End: 2025-02-04 | Stop reason: HOSPADM

## 2025-02-03 RX ORDER — ALPRAZOLAM 0.25 MG/1
0.5 TABLET ORAL 3 TIMES DAILY PRN
Status: DISCONTINUED | OUTPATIENT
Start: 2025-02-03 | End: 2025-02-04 | Stop reason: HOSPADM

## 2025-02-03 RX ORDER — LEVOTHYROXINE SODIUM 50 UG/1
50 TABLET ORAL
Status: DISCONTINUED | OUTPATIENT
Start: 2025-02-04 | End: 2025-02-04 | Stop reason: HOSPADM

## 2025-02-03 RX ORDER — GABAPENTIN 100 MG/1
100 CAPSULE ORAL
Status: DISCONTINUED | OUTPATIENT
Start: 2025-02-03 | End: 2025-02-04 | Stop reason: HOSPADM

## 2025-02-03 RX ORDER — GABAPENTIN 100 MG/1
100 CAPSULE ORAL DAILY
Status: DISCONTINUED | OUTPATIENT
Start: 2025-02-04 | End: 2025-02-03

## 2025-02-03 RX ORDER — ACETAMINOPHEN 325 MG/1
975 TABLET ORAL ONCE
Status: COMPLETED | OUTPATIENT
Start: 2025-02-03 | End: 2025-02-03

## 2025-02-03 RX ORDER — FOLIC ACID 1 MG/1
1 TABLET ORAL DAILY
Status: DISCONTINUED | OUTPATIENT
Start: 2025-02-04 | End: 2025-02-04 | Stop reason: HOSPADM

## 2025-02-03 RX ORDER — OXYMETAZOLINE HYDROCHLORIDE 0.05 G/100ML
2 SPRAY NASAL ONCE
Status: COMPLETED | OUTPATIENT
Start: 2025-02-03 | End: 2025-02-03

## 2025-02-03 RX ADMIN — ALPRAZOLAM 0.5 MG: 0.25 TABLET ORAL at 21:35

## 2025-02-03 RX ADMIN — LEVETIRACETAM 1000 MG: 500 TABLET, FILM COATED ORAL at 21:32

## 2025-02-03 RX ADMIN — SILVER NITRATE APPLICATORS 4 APPLICATOR: 25; 75 STICK TOPICAL at 12:39

## 2025-02-03 RX ADMIN — DEXAMETHASONE 4 MG: 2 TABLET ORAL at 13:07

## 2025-02-03 RX ADMIN — OXYMETAZOLINE HYDROCHLORIDE 2 SPRAY: 0.05 SPRAY NASAL at 10:34

## 2025-02-03 RX ADMIN — GABAPENTIN 100 MG: 100 CAPSULE ORAL at 12:38

## 2025-02-03 RX ADMIN — Medication 1000 MCG: at 13:07

## 2025-02-03 RX ADMIN — ACETAMINOPHEN 975 MG: 325 TABLET, FILM COATED ORAL at 10:34

## 2025-02-03 RX ADMIN — SODIUM CHLORIDE 500 ML: 0.9 INJECTION, SOLUTION INTRAVENOUS at 10:17

## 2025-02-03 RX ADMIN — GABAPENTIN 300 MG: 300 CAPSULE ORAL at 21:33

## 2025-02-03 RX ADMIN — ACETAMINOPHEN 650 MG: 325 TABLET, FILM COATED ORAL at 17:10

## 2025-02-03 NOTE — CONSULTS
Consultation - [facility]/ENT   Ayla Nye 74 y.o. female MRN: [id number]  Unit/Bed#: ED-42 Encounter: [id number]      Assessment: Epistaxis, patient is on Xarelto for recent small PE, has metastatic lung cancer, last chemo was 1/23, XRT to brain mets.     Hgb - 8.0, PLT - 130.   BP - 124/63 upon presentation.       Plan:  Left anterior nasal septum bleeding vessel cauterized bedside, see proc note below for details  HOB @ 30*  Nasal precautions  Apply ayr nasal gel BID to nostrils   There is no nasal packing in place  Monitor H&H, transfuse PRN   Drug holiday for Xarelto can be determined by the primary team   Optimize blood pressure control   F/U as an outpatient with Dr. Kwon upon discharge for re-evaluation, may consider cauterizing right anterior nasal septum at that time    Discussed with Dr. Kwon over the phone.      History of Present Illness   Physician Requesting Consult: Kaylan Cardona*  Reason for Consult / Principal Problem: Epistaxi  HPI: Ayla Nye is a 74 y.o. female with a PMH of metastatic lung cancer with brain mets, initially diagnosed with non-small cell lung cancer of the RUL, PE on xarelto, history of PCP pneumonia on prophylactic dose of Bactrim, hypertension, protein calorie malnutrition, hypothyroidism who presents with generalized weakness, confusion and epistaxis.  Patient was  diagnosed with PE in November 2024, on Xarelto and has noticed epistaxis for the past week.     The patient reports that her epistaxis has been on the right side in past but she also experienced it from left side today. The patient denies of waking up with blood clots or oozing/bleeding at night. She took her Xarelto this AM prior to ED presentation.     Review of systems:  10 Point ROS was performed and negative except as above or otherwise noted in the medical record.    Historical Information   Past Medical History:   Diagnosis Date    Allergic 2022    Allergic to neomiacin and cephalexin     Cancer (HCC)     lung cancer    Cancer (HCC)     mets to brain    Cancer (HCC)     perianal mass    Cataract Nov. 2023    Due to have durgery June 2024    Essential thrombocytosis (HCC)     controlled with medication    History of transfusion     Hyperlipidemia     Hypertension     Mass in chest     Nodular goiter     Osteoporosis     Peripheral neuropathy     Pneumonia Oct 16, 2023    Also  Feb 2024    Psoriasis     SIRS (systemic inflammatory response syndrome) (HCC) 06/22/2024     Past Surgical History:   Procedure Laterality Date    APPENDECTOMY      BREAST CYST EXCISION Right 2009    COLONOSCOPY  10/31/2018    DXA PROCEDURE (HISTORICAL)  04/21/2017    IR BIOPSY OTHER  9/12/2024    IR LUMBAR PUNCTURE  9/12/2024    MAMMO (HISTORICAL)      8-24-18    MAMMO NEEDLE LOCALIZATION RIGHT (ALL INC) Right 07/13/2009    SKIN LESION EXCISION      bridge of nose     Social History   Social History     Substance and Sexual Activity   Alcohol Use Yes    Alcohol/week: 5.0 standard drinks of alcohol    Types: 5 Glasses of wine per week     Social History     Substance and Sexual Activity   Drug Use Never     Social History     Tobacco Use   Smoking Status Former    Current packs/day: 1.00    Average packs/day: 1 pack/day for 59.1 years (59.1 ttl pk-yrs)    Types: Cigarettes    Start date: 1966    Passive exposure: Past   Smokeless Tobacco Never   Tobacco Comments    Quit 2010     Family History: Family history non-contributory    Meds/Allergies   all current active meds have been reviewed    Allergies   Allergen Reactions    Doxycycline Headache    Keflex [Cephalexin] Rash    Neomycin-Polymyxin-Dexameth Eye Swelling       Objective     Vitals:    02/03/25 0902   BP: 124/63   Pulse: 94   Resp: 20   Temp: 97.6 °F (36.4 °C)   SpO2: 98%     Physical Exam   Constitutional:  Well-developed and well-nourished, no apparent distress, non-toxic appearance. Cooperative, able to hear and answer questions without difficulty.    Voice: Normal  voice quality.  Head: Normocephalic, atraumatic.  No scars, masses or lesions.  Face: Symmetric, no edema, no sinus tenderness.  Eyes: Vision grossly intact.  Ears: External ears normal. No post-auricular erythema or tenderness.  Nose: Septum with very mild oozing noted from left anterior region, no oozing noted from right side. Dried heme noted with nasal vestibule. Mucosa dry, turbinates well appearing.     Oral cavity: Dentition intact - partially dentulous maxilla.  Mucosa moist, lips without lesions or masses.  Tongue mobile, floor of mouth soft and flat.  Hard palate intact.  No masses or lesions.   Oropharynx: Uvula is midline, soft palate intact without lesion or mass.  Oropharyngeal inlet without obstruction. Posterior pharyngeal wall clear. No masses or lesions.  Neck: Normal laryngeal elevation with swallow.  Trachea midline.  No masses or lesions. No palpable adenopathy.      No intake or output data in the 24 hours ending 02/03/25 1215    Invasive Devices       Peripheral Intravenous Line  Duration             Peripheral IV 02/03/25 Proximal;Right;Ventral (anterior) Forearm <1 day                    Lab Results: CBC:   Lab Results   Component Value Date    WBC 3.88 (L) 02/03/2025    HGB 8.0 (L) 02/03/2025    HCT 25.4 (L) 02/03/2025    MCV 95 02/03/2025     (L) 02/03/2025    RBC 2.68 (L) 02/03/2025    MCH 29.9 02/03/2025    MCHC 31.5 02/03/2025    RDW 15.3 (H) 02/03/2025    MPV 10.1 02/03/2025   , CMP:   Lab Results   Component Value Date    SODIUM 138 02/03/2025    K 4.6 02/03/2025     02/03/2025    CO2 25 02/03/2025    BUN 15 02/03/2025    CREATININE 0.96 02/03/2025    CALCIUM 8.8 02/03/2025    AST 13 02/03/2025    ALT 19 02/03/2025    ALKPHOS 45 02/03/2025    EGFR 58 02/03/2025   , Coags:   Lab Results   Component Value Date    PTT 42 (H) 02/03/2025    INR 1.91 (H) 02/03/2025     Control of Epistaxis Procedure Note:  Surgeon: Mumtaz Shah MD, DMD  The patient was addressed at  "the bedside/in the exam room. The nose was packed with cotton pledgets soaked with 4% lidocaine and oxymetazoline. The nasal cavities were examined with disposable nasal speculum.  An exposed blood vessel was located on the left anterior nasal septum. The right anterior nasal septum found to be hemostatic. The area was cauterized under direct vision with silver nitrate.  Hemostasis was achieved.  The patient tolerated the procedure well.  There were no complications.       Mumtaz Shah BDS, DMD, MPA, MD   PGY-1  Otorhinolaryngology - Head & Neck Surgery  Please contact ENT Resident Epic Secure Chat Role for any questions or concerns.    ** Please Note: Portions of the record may have been created with voice recognition software. Occasional wrong word or \"sound a like\" substitutions may have occurred due to the inherent limitations of voice recognition software. There may also be notations and random deletions of words or characters from malfunctioning software. Read the chart carefully and recognize, using context, where substitutions/deletions have occurred.**  "

## 2025-02-03 NOTE — CONSULTS
Medical Oncology/Hematology Consult Note  Ayla Nye, female, 74 y.o., 1950,  ED-42/ED-42, [id number]     Assessment and Plan    1. Metastatic lung adenocarcinoma with disease progression   2. Confusion, forgetfulness   3. Epistaxis  4. History of PE    Mrs. Nye is a 74 year old female with metastatic lung adenocarcinoma, currently on systemic therapy with carboplatin and pemetrexed, with most recent treatment on 1/23/25, presenting with progressive confusion and generalized weakness x 2-3 days. Patient with history of brain metastases from lung cancers, s/p SRS in August 2024 and in January 2025. Patient has been on steroids with decadron for many weeks now. Whenever steroids have been tapered to as low as 2 mg daily, patient would have episodes of worsening fatigue and confusion, similar to what was present during this admission.  CT brain showed stable findings with left posterior frontal, parietal, and occipital vasogenic edema with 5 mm midline shift to the right.    Patient's presenting symptoms are likely 2/2 steroid tapering. There have been multiple recurrent admissions with same situation with patient presenting with confusion and weakness once steroid dosing is down to ~ 2mg daily dosing. Epistaxis likely 2/2 dry air. Platelet counts within normal range.    Summary of recommendations:     Agree with radiation oncology team recommendations on increasing steroid dosing to Dexamethasone 4mg BID. Would do slow tapering of steroids given multiple episodes of recurrent admission for patient not being able to tolerating the latter part of steroid tapering.   Dex 4mg BID x 7 days  Dex 3mg BID x 7 days   Dex 2 mg BID - would maintain at this dose if there are no contraindications  Please maintain patient on PPI for GI stress ulcer ppx given long term steroid udse  Conservative management of epistaxis recommended to keep the nares moisturized. ENT team also on board.  Once patient's epistaxis is  resolved, would favor resuming AC with Xarelto given high risk for recurrent VTE in context of underlying metastatic lung cancer  Follow-up OP once medically stable to continue systemic treatment for metastatic lung adenocarcinoma. Avastin will likely be added with next round of systemic treatment    Outpatient follow up plan: Medical oncology f/u is currently scheduled with Dr. Castro on 2/11/25 at 4PM at [facility].     Communication with patient/family:  Patient, ex-, and daughter were updated regarding above recommendations     Communication with team:  Primary team was updated regarding above recommendations.     Case to be discussed with hematology/oncology attending, Dr. Kelley      Reason for consultation: metastatic lung adenoCA    History of present illness:      Ayla Nye is a 74 y.o. female with metastatic lung adenocarcinoma, currently on systemic therapy with carboplatin and pemetrexed. She was originally diagnosed as stage IIIB lung adenocarcinoma in March 2024, underwent definitive chemoRT between May and July 2024. She was found to have brain lesions concerning for supratentorial and infratentorial brain metastases in July 2024, for which she underwent SRS in August 2024. Imaging in October 2024 was notable for bilateral pulmonary nodules, new right renal lesion, and issue rectal lesion, concerning for metastasis.  Patient was initiated on systemic treatment with chemoimmunotherapy combination on 10/7/2024, consisting of carboplatin, pemetrexed, and pembrolizumab. Patient has had multiple hospital admissions since October for evaluation of fatigue, shortness of breath, generalized weakness, and febrile episodes.  During November 2024 admission, patient was diagnosed with PCP pneumonia. Patient was admitted in the hospital from 12/30/24-1/22/25 with progressive shortness of breath.  There was concern for pneumonitis patient was initiated on steroids with improvement of her  symptoms. Immunotherapy has been removed from her treatment regimen after first 2 cycles due to concern for pneumonitis. Additionally, patient was most recently admitted from 1/23-1/28/25 for evaluation of fevers/chills. She met SIRS criteria on admission and was treated for possible UTI in setting of CT showing bilateral perinephric stranding. Most recent systemic treatment with carboplatin and pemetrexed was on 1/23/25.     Patient was brought to [facility] ED today for evaluation of generalized weakness and confusion. Patient has been more confused with the tapering of dexamethasone from 4 mg to 2 mg daily.  Also with episodes of epistaxis. ED vitals were stable on arrival. CT brain was negative for any acute intracranial process. Left posterior frontal, parietal, and occipital vasogenic edema with 5 mm midline shift to the right without significant change since December imaging.     I met patient and family at bedside in ED. Patient explained that she has been on a steroid taper.  It was tapered from 2 mg twice daily to 2 mg daily last Wednesday.  Over the past 4 days, patient has noticed increased forgetfulness.  As per patient's ex- and daughter who were in the room, patient has been getting progressively weaker with forgetfulness and confusion. One example was that she has been forgetting to turn off faucets.         Review of Systems:    Review of Systems   Constitutional:  Positive for appetite change (declined) and fatigue. Negative for fever and unexpected weight change.   HENT:  Positive for nosebleeds (from both nostrils x 4-5 days, with occasional clots also). Negative for mouth sores, trouble swallowing and voice change.    Eyes:  Negative for redness and visual disturbance.   Respiratory:  Negative for cough, shortness of breath and wheezing.    Cardiovascular:  Negative for chest pain, palpitations and leg swelling.   Gastrointestinal:  Negative for abdominal pain, blood in stool,  diarrhea, nausea and vomiting.   Genitourinary:  Negative for difficulty urinating, dysuria, frequency and hematuria.   Musculoskeletal:  Positive for gait problem. Negative for arthralgias, back pain and myalgias.   Neurological:  Positive for weakness (generalized) and headaches (stable). Negative for dizziness and tremors.   Hematological:  Negative for adenopathy. Does not bruise/bleed easily.   Psychiatric/Behavioral:  Positive for confusion and decreased concentration. Negative for agitation.         Forgetfulness       Oncology History:   Cancer Staging   Metastatic adenocarcinoma (HCC)  Staging form: Lung, AJCC 8th Edition  - Clinical stage from 4/15/2024: Stage IIIB (cT2b, cN3, cM0) - Signed by Rogelio Beebe DO on 4/15/2024  - Clinical: Stage IV (cM1) - Signed by Honey Gamboa MD on 9/23/2024    Oncology History   Metastatic adenocarcinoma (HCC)   2024 Initial Diagnosis    Primary lung adenocarcinoma, right (HCC)     3/26/2024 Biopsy    A-C. Lymph Node, Level 4R :    - Metastatic non-small cell carcinoma, most compatible with a primary lung adenocarcinoma; see note.       D-F. Lymph Node, Level 10R:    - Metastatic non-small cell carcinoma; see note.       G-I. Lymph Node, Level 4L:    - Metastatic non-small cell carcinoma; see note.       4/15/2024 -  Cancer Staged    Staging form: Lung, AJCC 8th Edition  - Clinical stage from 4/15/2024: Stage IIIB (cT2b, cN3, cM0) - Signed by Rogelio Beebe DO on 4/15/2024       5/23/2024 - 7/2/2024 Chemotherapy    alteplase (CATHFLO), 2 mg, Intracatheter, Every 1 Minute as needed, 6 of 6 cycles  CARBOplatin (PARAPLATIN) IVPB (GOG AUC DOSING), 130.4 mg, Intravenous, Once, 6 of 6 cycles  Administration: 130.4 mg (5/23/2024), 144.8 mg (5/30/2024), 138.4 mg (6/6/2024), 144.8 mg (6/13/2024), 132 mg (6/21/2024), 143.6 mg (7/2/2024)  PACLItaxel (TAXOL) chemo IVPB, 45 mg/m2 = 67.2 mg (90 % of original dose 50 mg/m2), Intravenous, Once, 6 of 6 cycles  Dose  modification: 45 mg/m2 (original dose 50 mg/m2, Cycle 1, Reason: Anticipated Tolerance)  Administration: 67.2 mg (5/23/2024), 67.2 mg (5/30/2024), 67.2 mg (6/6/2024), 67.2 mg (6/13/2024), 67.2 mg (6/21/2024), 67.2 mg (7/2/2024)     5/23/2024 - 7/5/2024 Radiation    Treatment:  Course: C1    Plan ID Energy Fractions Dose per Fraction (cGy) Dose Correction (cGy) Total Dose Delivered (cGy) Elapsed Days   R Lung_Hilum 6X 30 / 30 200 0 6,000 43      Treatment dates:  C1: 5/23/2024 - 7/5/2024 8/8/2024 - 8/8/2024 Radiation    SRS 5 PTVs   2000 cGy     9/12/2024 Biopsy    Mass, Superficial perianal mass:  - Squamous cell carcinoma.     Note: The patient's prior lung EBUS sampling shows the tumor to stain for TTF1 and Napsin with absent p40 expression.  The current perianal mass sampling shows diffuse p40 expression with absent TTF1 expression.  This may represent a primary anal/perianal squamous cell carcinoma versus a possible metastatic combined lung tumor (adenocarcinoma and previously unsampled squamous component).  Suggest clinical correlation and appropriate follow-up.         9/23/2024 -  Cancer Staged    Staging form: Lung, AJCC 8th Edition  - Clinical: Stage IV (cM1) - Signed by Honey Gamboa MD on 9/23/2024       10/1/2024 - 10/18/2024 Radiation    Course: C3    Plan ID Energy Fractions Dose per Fraction (cGy) Dose Correction (cGy) Total Dose Delivered (cGy) Elapsed Days   R Gluteus:1 10X/6X 14 / 15 300 0 4,200 17      Treatment Dates:  10/1/2024 - 10/18/2024.       10/7/2024 -  Chemotherapy    cyanocobalamin, 1,000 mcg, Intramuscular, Once, 2 of 3 cycles  Administration: 1,000 mcg (8/19/2024), 1,000 mcg (1/23/2025)  alteplase (CATHFLO), 2 mg, Intracatheter, Every 1 Minute as needed, 3 of 6 cycles  palonosetron (ALOXI), 0.25 mg, Intravenous, Once, 1 of 4 cycles  Administration: 0.25 mg (1/23/2025)  fosaprepitant (EMEND) IVPB, 150 mg, Intravenous, Once, 3 of 6 cycles  Administration: 150 mg (10/7/2024), 150  mg (1/23/2025), 150 mg (11/14/2024)  CARBOplatin (PARAPLATIN) IVPB ([facility] AUC DOSING), 347 mg, Intravenous, Once, 3 of 6 cycles  Administration: 347 mg (10/7/2024), 256.8 mg (1/23/2025), 346 mg (11/14/2024)  bevacizumab (AVASTIN) IVPB, 5 mg/kg = 280 mg, Intravenous, Once, 1 of 4 cycles  Dose modification: 5 mg/kg (original dose 5 mg/kg, Cycle 4, Reason: Anticipated Tolerance)  pemetrexed (ALIMTA) chemo infusion, 735 mg, Intravenous, Once, 3 of 6 cycles  Administration: 700 mg (10/7/2024), 700 mg (1/23/2025), 700 mg (11/14/2024)  pembrolizumab (KEYTRUDA) IVPB, 200 mg, Intravenous, Once, 2 of 2 cycles  Administration: 200 mg (11/14/2024), 200 mg (10/7/2024)     Metastatic cancer to brain (HCC)   7/29/2024 Initial Diagnosis    Metastatic cancer to brain (HCC)     8/8/2024 - 8/8/2024 Radiation    SRS 5 PTVs   2000 cGy     9/12/2024 Biopsy    Mass, Superficial perianal mass:  - Squamous cell carcinoma.     Note: The patient's prior lung EBUS sampling shows the tumor to stain for TTF1 and Napsin with absent p40 expression.  The current perianal mass sampling shows diffuse p40 expression with absent TTF1 expression.  This may represent a primary anal/perianal squamous cell carcinoma versus a possible metastatic combined lung tumor (adenocarcinoma and previously unsampled squamous component).  Suggest clinical correlation and appropriate follow-up.         10/1/2024 - 10/18/2024 Radiation    Course: C3    Plan ID Energy Fractions Dose per Fraction (cGy) Dose Correction (cGy) Total Dose Delivered (cGy) Elapsed Days   R Gluteus:1 10X/6X 14 / 15 300 0 4,200 17      Treatment Dates:  10/1/2024 - 10/18/2024.           Past Medical History:   Past Medical History:   Diagnosis Date    Allergic 2022    Allergic to neomiacin and cephalexin    Cancer (HCC)     lung cancer    Cancer (HCC)     mets to brain    Cancer (HCC)     perianal mass    Cataract Nov. 2023    Due to have durgery June 2024    Essential thrombocytosis (HCC)      controlled with medication    History of transfusion     Hyperlipidemia     Hypertension     Mass in chest     Nodular goiter     Osteoporosis     Peripheral neuropathy     Pneumonia Oct 16, 2023    Also  2024    Psoriasis     SIRS (systemic inflammatory response syndrome) (HCC) 2024       Past Surgical History:   Procedure Laterality Date    APPENDECTOMY      BREAST CYST EXCISION Right     COLONOSCOPY  10/31/2018    DXA PROCEDURE (HISTORICAL)  2017    IR BIOPSY OTHER  2024    IR LUMBAR PUNCTURE  2024    MAMMO (HISTORICAL)      8-24-    MAMMO NEEDLE LOCALIZATION RIGHT (ALL INC) Right 2009    SKIN LESION EXCISION      bridge of nose       Family History   Problem Relation Age of Onset    Stroke Mother     Depression Mother             Hypertension Mother     Hearing loss Mother     Tuberculosis Father     Cancer Brother         lung    Tuberculosis Brother     Coronary artery disease Brother     Hypertension Brother     No Known Problems Daughter     No Known Problems Daughter     No Known Problems Maternal Grandmother     No Known Problems Maternal Grandfather     No Known Problems Paternal Grandmother     No Known Problems Paternal Grandfather     No Known Problems Maternal Aunt     No Known Problems Maternal Aunt     No Known Problems Maternal Aunt     No Known Problems Maternal Aunt     No Known Problems Paternal Aunt     Cancer Brother         Throat canver       Social History     Socioeconomic History    Marital status:      Spouse name: Not on file    Number of children: Not on file    Years of education: Not on file    Highest education level: Not on file   Occupational History    Not on file   Tobacco Use    Smoking status: Former     Current packs/day: 1.00     Average packs/day: 1 pack/day for 59.1 years (59.1 ttl pk-yrs)     Types: Cigarettes     Start date:      Passive exposure: Past    Smokeless tobacco: Never    Tobacco comments:     Quit     Vaping Use    Vaping status: Never Used   Substance and Sexual Activity    Alcohol use: Yes     Alcohol/week: 5.0 standard drinks of alcohol     Types: 5 Glasses of wine per week    Drug use: Never    Sexual activity: Not Currently     Partners: Male     Birth control/protection: Post-menopausal   Other Topics Concern    Not on file   Social History Narrative    Not on file     Social Drivers of Health     Financial Resource Strain: Low Risk  (2023)    Overall Financial Resource Strain (CARDIA)     Difficulty of Paying Living Expenses: Not hard at all   Food Insecurity: No Food Insecurity (2024)    Nursing - Inadequate Food Risk Classification     Worried About Running Out of Food in the Last Year: Never true     Ran Out of Food in the Last Year: Never true     Ran Out of Food in the Last Year: Never true   Transportation Needs: No Transportation Needs (2024)    Nursing - Transportation Risk Classification     Lack of Transportation: Not on file     Lack of Transportation: No   Physical Activity: Not on file   Stress: Not on file   Social Connections: Not on file   Intimate Partner Violence: Unknown (2024)    Nursing IPS     Feels Physically and Emotionally Safe: Not on file     Physically Hurt by Someone: Not on file     Humiliated or Emotionally Abused by Someone: Not on file     Physically Hurt by Someone: No     Hurt or Threatened by Someone: No   Housing Stability: Unknown (2024)    Nursing: Inadequate Housing Risk Classification     Has Housing: Not on file     Worried About Losing Housing: Not on file     Unable to Get Utilities: Not on file     Unable to Pay for Housing in the Last Year: No     Has Housin       No current facility-administered medications for this encounter.    Current Outpatient Medications:     amLODIPine (NORVASC) 5 mg tablet, TAKE 1 TABLET(5 MG) BY MOUTH DAILY, Disp: 30 tablet, Rfl: 5    dexamethasone (DECADRON) 4 mg tablet, Take 1 tablet (4 mg total)  by mouth every 8 (eight) hours for 5 days, THEN 1 tablet (4 mg total) every 12 (twelve) hours for 7 days, THEN 1.5 tablets (6 mg total) in the morning for 7 days, THEN 0.5 tablets (2 mg total) 2 (two) times a day for 7 days, THEN 0.5 tablets (2 mg total) in the morning for 7 days. When you get to the 6 mg dosage please take 4 mg in the morning followed by 2 mg in the evening; that would be 1 tablet in the morning followed by 1/2 tablet in the evening for that week.., Disp: 50 tablet, Rfl: 0    folic acid (FOLVITE) 1 mg tablet, Take 1 tablet (1 mg total) by mouth daily, Disp: 30 tablet, Rfl: 6    gabapentin (NEURONTIN) 100 mg capsule, TAKE 1 CAPSULE BY MOUTH EVERY MORNING, 1 CAPSULE EVERY AFTERNOON AND 3 CAPSULES EVERY NIGHT AT BEDTIME, Disp: 150 capsule, Rfl: 5    levETIRAcetam (KEPPRA) 1000 MG tablet, TAKE 1 TABLET(1000 MG) BY MOUTH EVERY 12 HOURS, Disp: 60 tablet, Rfl: 1    levothyroxine 50 mcg tablet, TAKE 1 TABLET(50 MCG) BY MOUTH DAILY EARLY MORNING, Disp: 30 tablet, Rfl: 1    pantoprazole (PROTONIX) 40 mg tablet, Take 1 tablet (40 mg total) by mouth daily in the early morning, Disp: 30 tablet, Rfl: 5    rivaroxaban (Xarelto) 20 mg tablet, Take 1 tablet (20 mg total) by mouth daily with breakfast, Disp: 30 tablet, Rfl: 0    senna (SENOKOT) 8.6 MG tablet, Take 1 tablet by mouth daily Takes one every other day, Disp: , Rfl:     sodium chloride (OCEAN) 0.65 % nasal spray, 1 spray into each nostril as needed for congestion, Disp: 60 mL, Rfl: 1    sulfamethoxazole-trimethoprim (BACTRIM DS) 800-160 mg per tablet, Take 1 tablet by mouth 3 (three) times a week Starting 12/24, you can take one tablet Monday, Wednesday, and Fridays., Disp: 36 tablet, Rfl: 1    vitamin B-12 (VITAMIN B-12) 1,000 mcg tablet, Take 1 tablet (1,000 mcg total) by mouth daily, Disp: 90 tablet, Rfl: 1    Not in a hospital admission.    Allergies   Allergen Reactions    Doxycycline Headache    Keflex [Cephalexin] Rash     Neomycin-Polymyxin-Dexameth Eye Swelling         Physical Exam:     /63 (BP Location: Right arm)   Pulse 94   Temp 97.6 °F (36.4 °C) (Oral)   Resp 20   SpO2 98%     Physical Exam  Vitals reviewed.   Constitutional:       General: She is not in acute distress.     Appearance: She is not ill-appearing or toxic-appearing.   HENT:      Head: Normocephalic and atraumatic.      Nose:      Comments: Some dried blood noted at the nares     Mouth/Throat:      Mouth: Mucous membranes are moist.      Pharynx: No oropharyngeal exudate or posterior oropharyngeal erythema.   Eyes:      General: No scleral icterus.     Extraocular Movements: Extraocular movements intact.      Conjunctiva/sclera: Conjunctivae normal.   Cardiovascular:      Rate and Rhythm: Normal rate and regular rhythm.      Heart sounds: Normal heart sounds. No murmur heard.     No gallop.   Pulmonary:      Effort: Pulmonary effort is normal. No respiratory distress.      Breath sounds: Normal breath sounds. No wheezing.   Abdominal:      General: Bowel sounds are normal.      Palpations: Abdomen is soft.      Tenderness: There is no abdominal tenderness.   Musculoskeletal:      Cervical back: Normal range of motion. No rigidity.      Right lower leg: No edema.      Left lower leg: No edema.   Lymphadenopathy:      Cervical: No cervical adenopathy.   Neurological:      General: No focal deficit present.      Mental Status: She is alert.      Comments: Bilateral 4/5 weakness in LE       Recent Results (from the past 48 hours)   FLU/COVID Rapid Antigen (30 min. TAT) - Preferred screening test in ED    Collection Time: 02/03/25  9:48 AM    Specimen: Nose; Nares   Result Value Ref Range    SARS COV Rapid Antigen Negative Negative    Influenza A Rapid Antigen Negative Negative    Influenza B Rapid Antigen Negative Negative   UA w Reflex to Microscopic w Reflex to Culture    Collection Time: 02/03/25  9:48 AM    Specimen: Urine, Clean Catch   Result Value Ref  Range    Color, UA Yellow     Clarity, UA Clear     Specific Gravity, UA 1.026 1.003 - 1.030    pH, UA 6.0 4.5, 5.0, 5.5, 6.0, 6.5, 7.0, 7.5, 8.0    Leukocytes, UA Negative Negative    Nitrite, UA Negative Negative    Protein, UA 50 (1+) (A) Negative mg/dl    Glucose, UA Negative Negative mg/dl    Ketones, UA Negative Negative mg/dl    Urobilinogen, UA <2.0 <2.0 mg/dl mg/dl    Bilirubin, UA Negative Negative    Occult Blood, UA Moderate (A) Negative   Urine Microscopic    Collection Time: 02/03/25  9:48 AM   Result Value Ref Range    RBC, UA 30-50 (A) None Seen, 1-2 /hpf    WBC, UA 4-10 (A) None Seen, 1-2 /hpf    Epithelial Cells Occasional None Seen, Occasional /hpf    Bacteria, UA None Seen None Seen, Occasional /hpf    Hyaline Casts, UA 0-3 (A) None Seen /lpf   CBC and differential    Collection Time: 02/03/25 10:10 AM   Result Value Ref Range    WBC 3.88 (L) 4.31 - 10.16 Thousand/uL    RBC 2.68 (L) 3.81 - 5.12 Million/uL    Hemoglobin 8.0 (L) 11.5 - 15.4 g/dL    Hematocrit 25.4 (L) 34.8 - 46.1 %    MCV 95 82 - 98 fL    MCH 29.9 26.8 - 34.3 pg    MCHC 31.5 31.4 - 37.4 g/dL    RDW 15.3 (H) 11.6 - 15.1 %    MPV 10.1 8.9 - 12.7 fL    Platelets 130 (L) 149 - 390 Thousands/uL   Basic metabolic panel    Collection Time: 02/03/25 10:10 AM   Result Value Ref Range    Sodium 138 135 - 147 mmol/L    Potassium 4.6 3.5 - 5.3 mmol/L    Chloride 106 96 - 108 mmol/L    CO2 25 21 - 32 mmol/L    ANION GAP 7 4 - 13 mmol/L    BUN 15 5 - 25 mg/dL    Creatinine 0.96 0.60 - 1.30 mg/dL    Glucose 99 65 - 140 mg/dL    Calcium 8.8 8.4 - 10.2 mg/dL    eGFR 58 ml/min/1.73sq m   Hepatic function panel    Collection Time: 02/03/25 10:10 AM   Result Value Ref Range    Total Bilirubin 0.27 0.20 - 1.00 mg/dL    Bilirubin, Direct 0.09 0.00 - 0.20 mg/dL    Alkaline Phosphatase 45 34 - 104 U/L    AST 13 13 - 39 U/L    ALT 19 7 - 52 U/L    Total Protein 6.6 6.4 - 8.4 g/dL    Albumin 3.7 3.5 - 5.0 g/dL   Protime-INR    Collection Time: 02/03/25  "10:10 AM   Result Value Ref Range    Protime 22.6 (H) 12.3 - 15.0 seconds    INR 1.91 (H) 0.85 - 1.19   APTT    Collection Time: 02/03/25 10:10 AM   Result Value Ref Range    PTT 42 (H) 23 - 34 seconds   HS Troponin 0hr (reflex protocol)    Collection Time: 02/03/25 10:10 AM   Result Value Ref Range    hs TnI 0hr 3 \"Refer to ACS Flowchart\"- see link ng/L   Magnesium    Collection Time: 02/03/25 10:10 AM   Result Value Ref Range    Magnesium 2.0 1.9 - 2.7 mg/dL   ECG 12 lead    Collection Time: 02/03/25 10:19 AM   Result Value Ref Range    Ventricular Rate 80 BPM    Atrial Rate 80 BPM    DC Interval 138 ms    QRSD Interval 66 ms    QT Interval 382 ms    QTC Interval 440 ms    P [place] 69 degrees    QRS Axis 37 degrees    T Wave Axis 46 degrees       XR chest 1 view portable  Result Date: 2/3/2025  Narrative: XR CHEST PORTABLE INDICATION: WEAKNESS. COMPARISON: 12/30/2024 and 12/1/2024 FINDINGS: Increasing density of right upper lobe opacity compared with prior studies, likely postobstructive consolidation with progression of volume loss and upward deviation of the fissure. No acute new infiltrates or dominant mass lesions seen elsewhere at this  time. No pneumothorax or pleural effusion. Stable cardiomediastinal silhouette Bones are unremarkable for age. Normal upper abdomen.     Impression: Increased density of pre-existing right upper lobe region opacity with suggestion of progressive volume loss in the right upper lobe with upward shifting of the fissure. Workstation performed: [id number]     CT head without contrast  Result Date: 2/3/2025  Narrative: CT BRAIN - WITHOUT CONTRAST INDICATION:   Brain mets with worsening headache and nose bleeds. on xarelto. COMPARISON: CT head 11/24/2024 and brain MRI 12/27/2024 TECHNIQUE:  CT examination of the brain was performed.  Multiplanar 2D reformatted images were created from the source data. Radiation dose length product (DLP) for this visit:  984 mGy-cm .  This examination, " like all CT scans performed in the [facility], was performed utilizing techniques to minimize radiation dose exposure, including the use of iterative reconstruction and automated exposure control. IMAGE QUALITY:  Diagnostic. FINDINGS: PARENCHYMA:No intracranial mass, mass effect or midline shift. No CT signs of acute infarction.  No acute parenchymal hemorrhage. Left posterior frontal, parietal and occipital vasogenic edema. Left posterior frontal parafalcine vasogenic increase since November 2024 and likely unchanged since December 2024 allowing for difference in imaging modalities. Stable 5 mm midline shift to the right. Chronic microangiopathy. Calcification of bilateral internal carotid arteries. VENTRICLES AND EXTRA-AXIAL SPACES: Effaced left occipital horn unchanged. No acute hydrocephalus. VISUALIZED ORBITS: Normal visualized orbits. PARANASAL SINUSES: Normal visualized paranasal sinuses. CALVARIUM AND EXTRACRANIAL SOFT TISSUES: Unremarkable scalp. No skull fracture. Bilateral hyperostosis frontalis.     Impression: No acute intracranial process. Left posterior frontal, parietal and occipital vasogenic edema with 5 mm midline shift to the right without significant change since December 27, 2024 allowing for difference in imaging modalities and head positioning. Workstation performed: [id number]     CTA chest pe study  Result Date: 1/24/2025  Narrative: CTA - CHEST WITH IV CONTRAST - PULMONARY ANGIOGRAM INDICATION: r/o PE. COMPARISON: CT chest abdomen pelvis 1/23/2025. Chest CT 1/14/2025. CTA chest abdomen pelvis 11/24/2024. TECHNIQUE: CTA examination of the chest was performed using angiographic technique according to a protocol specifically tailored to evaluate for pulmonary embolism. Multiplanar 2D reformatted images were created from the source data. In addition, coronal  3D MIP postprocessing was performed on the acquisition scanner. Radiation dose length product (DLP) for this visit: 176  mGy-cm . This examination, like all CT scans performed in the [facility], was performed utilizing techniques to minimize radiation dose exposure, including the use of iterative reconstruction and automated exposure control. IV Contrast: 50 mL of iohexol (OMNIPAQUE) FINDINGS: PULMONARY ARTERIAL TREE: Tiny peripheral filling defect in a left lower lobe segmental artery on image 302/106. In retrospect this is unchanged since 1/14/2025, and represents sequela of previously seen embolus on 11/24/2024. No new filling defects to indicate acute embolus. LUNGS: 3.4 x 2.7 cm paramediastinal right upper lobe mass seen again. Surrounding fibrotic changes within the right upper lobe extending through the right lower lobe in a linear fashion likely due to radiation. Background mild emphysema. 1.6 cm lingular and 1.5 cm left lower lobe nodule seen again. PLEURA: Small bilateral pleural effusions. Calcified pleural plaque along the left lower lobe. HEART/GREAT VESSELS: Normal heart size. Atherosclerotic coronary artery and thoracic aortic calcification. No thoracic aortic aneurysm. MEDIASTINUM AND SUDEEP: Small hiatal hernia noted. No mediastinal lymphadenopathy. CHEST WALL AND LOWER NECK: Unremarkable. VISUALIZED STRUCTURES IN THE UPPER ABDOMEN: Moderate hiatal hernia. Partially visualized right renal mass better seen on CT chest abdomen pelvis from the day prior. OSSEOUS STRUCTURES: Mild T5 superior endplate compression fracture. Thoracic spondylosis.     Impression: 1. No evidence of acute pulmonary embolism. Stable tiny chronic embolus in a left lower lobe segmental artery. 2. Small bilateral pleural effusions. Otherwise no significant interval change in exam when compared to CT chest, abdomen, and pelvis examination from day prior. Resident: Reg Head I, the attending radiologist, have reviewed the images and agree with the final report above. Workstation performed: [id number]     CT chest abdomen pelvis w  contrast  Result Date: 1/24/2025  Narrative: CT CHEST, ABDOMEN AND PELVIS WITH IV CONTRAST INDICATION: fever and bilateral lumbar/flank pain; sepsis. COMPARISON: CT 1/14/2025, 12/30/2024, 11/24/2024, 10/22/2024 TECHNIQUE: CT examination of the chest, abdomen and pelvis was performed. Multiplanar 2D reformatted images were created from the source data. This examination, like all CT scans performed in the [facility], was performed utilizing techniques to minimize radiation dose exposure, including the use of iterative reconstruction and automated exposure control. Radiation dose length product (DLP) for this visit: 315 mGy-cm IV Contrast: 90 mL of iohexol (OMNIPAQUE) Enteric Contrast: Not administered. FINDINGS: CHEST LUNGS: Right upper lobe mass measures 3.2 x 2.4 cm, shows progressive enlargement. There is currently more abutment with the adjacent mediastinum then when compared to the most recent prior. Parenchymal scarring in the right upper lobe and right lower lobe superior segments, likely radiation changes. 16 mm lingular nodule series 301/82, 8 mm on most recent prior 15 mm left lower lobe nodule series 301/82, 13 mm on most recent prior PLEURA: Unremarkable. HEART/GREAT VESSELS: Heart is unremarkable for patient's age. No thoracic aortic aneurysm. MEDIASTINUM AND SUDEEP: Unremarkable. CHEST WALL AND LOWER NECK: Unremarkable. ABDOMEN LIVER/BILIARY TREE: Unremarkable. GALLBLADDER: No calcified gallstones. No pericholecystic inflammatory change. SPLEEN: Unremarkable. PANCREAS: Unremarkable. ADRENAL GLANDS: Unremarkable. KIDNEYS/URETERS: Right upper pole ill-defined hypoenhancing mass measures 5.5 x 4.2 cm on series 301/108, this measured as large as 4.5 cm on the most recent prior CT abdomen based off of my measurement Interval increase of bilateral perinephric stranding, even when compared to the most recent chest CT. There is mild right urothelial thickening in the renal pelvis STOMACH AND  BOWEL: Colonic diverticulosis without findings of acute diverticulitis. APPENDIX: No findings to suggest appendicitis. ABDOMINOPELVIC CAVITY: 3.6 x 2.5 cm right ischiorectal fossa mass, series 301/227, decreased in size VESSELS: Unremarkable for patient's age. PELVIS REPRODUCTIVE ORGANS: Unremarkable for patient's age. URINARY BLADDER: Unremarkable. ABDOMINAL WALL/INGUINAL REGIONS: Unremarkable. BONES: Mild T5 superior endplate compression, in retrospect, there may have been a mild superior endplate compression on the most recent chest CT, which is now more pronounced, with surrounding sclerosis suggesting healing.     Impression: 1.  Findings consistent with disease progression, with rapid interval enlargement of left lung pulmonary nodules when compared to the CT 1/14/2025, increase in right upper lobe mass, and increase in right renal mass. There has been interval decrease in size of right ischiorectal fossa mass when compared to 11/24/2024. 2.  Bilateral perinephric stranding, with right urothelial thickening, concerning for urinary tract infection 3.  Mild T5 compression deformity, appears subacute The study was marked in EPIC for immediate notification. Workstation performed: [id number]     CT chest w contrast  Result Date: 1/14/2025  Narrative: CT CHEST WITH IV CONTRAST INDICATION: C34.11: Malignant neoplasm of upper lobe, right bronchus or lung. New onset back pain COMPARISON: Abdominal CT dated 12/31/2024 TECHNIQUE: CT examination of the chest was performed. Multiplanar 2D reformatted images were created from the source data. This examination, like all CT scans performed in the [facility], was performed utilizing techniques to minimize radiation dose exposure, including the use of iterative reconstruction and automated exposure control. Radiation dose length product (DLP) for this visit: 157.4 mGy-cm IV Contrast: 85 mL of iohexol (OMNIPAQUE) FINDINGS: LUNGS: There are enlarging nodules in  the left lower lobe and lingula including 8 mm lingula nodule on image 176 of series 3 which measured 5 mm previously. A bilobed nodule versus 2 adjacent nodules in the left lower lobe measure 8 x 13 mm on image 177 compared with 4 x 8 mm previously. Right upper lobe mass measures 2.5 cm AP by 2.5 cm transverse on image 61 of series 3, unchanged. Adjacent bandlike parenchymal opacity with involvement of the right upper and lower lobes appears similar and may reflect  posttreatment change. There is a background of mild emphysema. PLEURA: Unremarkable. HEART/GREAT VESSELS: Heart is unremarkable for patient's age. No thoracic aortic aneurysm. MEDIASTINUM AND SUDEEP: Low-attenuation soft tissue along the mediastinum on the right which is contiguous with the esophagus may reflect volume averaging with the adjacent azygos vein. CHEST WALL AND LOWER NECK: Unremarkable. VISUALIZED STRUCTURES IN THE UPPER ABDOMEN: Ill-defined mass along the upper pole of the right kidney measures 3.8 cm transverse by 2.9 cm AP on image 104 of series 2, similar to prior abdominal CT. Hepatic steatosis is noted. OSSEOUS STRUCTURES: No acute fracture or destructive osseous lesion.     Impression: 1. No evidence of osseous metastatic disease of the thoracic spine. 2. Worsening nodules in the left lower lobe and lingula compatible with metastatic disease. 3. Similar appearance of the posttreatment change in the right lung. 4. Low-attenuation soft tissue density along the right mediastinum appears slightly more pronounced but at least partially reflects the azygos vein. Recommend attention to this area on follow-up. 5. Right upper renal mass is similar to recent abdominal CT. Primary or metastatic renal neoplasm suspected. Workstation performed: [id number]       Labs and pertinent reports reviewed.

## 2025-02-03 NOTE — H&P
H&P - Hospitalist   Name: Ayla Nye 74 y.o. 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  Moderate protein-calorie malnutrition (HCC)  Malnutrition Findings:    Continue nutritional supplements,                             BMI Findings:           There is no height or weight on file to calculate BMI.     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   Plan as under acute metabolic encephalopathy  Acute metabolic encephalopathy  Patient daughter reports she noticed confusion with tapering of the steroid with fatigue.  UA is negative for infection  CT of the head shows no acute intracranial process but left posterior frontal parietal and occipital vasogenic edema with 5 mm midline shift to the right without significant change, this was reviewed with radiation oncology and compared to prior Cts  Would start back on Decadron 4 mg twice daily and monitor mental status.  Currently alert oriented x 4 no acute neurofocal deficit noted.  If she has no improvement, would recommend going up to 4mg Q6H.   Would recommend slow taper again but would not taper below 2mg daily. She has been started on Avastin with  the hope to bridge her off steroids as radiation oncology recommendation.  Hypothyroidism  Continue home dose of 50 mcg of levothyroxine  Anemia  Hemoglobin is stable, anemia contributed by chemotherapy and epistaxis  Monitor hemoglobin  History of Pneumocystis jirovecii pneumonia  Continue Bactrim 1 tab 3 times a week  Epistaxis  Presents with recurrent epistaxis for the past week, complains of clot which is intermittent, while blowing the nose, no injury or trauma,  Recently started on Xarelto 20 mg daily for PE,  Will hold Xarelto for now, monitor hemoglobin.  Reached out to ENT, will likely need cauterization of the bleeding vessel on the right nare, will give nasal saline spray,  Apply pressure, ice pack, monitor platelet count which is 130s,  Follow-up with ENT as outpatient.      VTE Pharmacologic Prophylaxis:   High Risk (Score >/= 5) - Pharmacological DVT Prophylaxis Contraindicated. Sequential Compression Devices Ordered.  Code Status: Prior   Discussion with family: Updated  ( and daughter) at bedside.    Anticipated Length of Stay: Patient will be admitted on an inpatient basis with an anticipated length of stay of greater than 2 midnights secondary to epistaxis, fatigue and confusion.    History of Present Illness   Chief Complaint: Recurrent epistaxis and fatigue and confusion    Ayla Nye is a 74 y.o. female with a PMH of metastatic lung cancer with brain mets, initially diagnosed with non-small cell lung cancer of the right upper lobe ,JAK2 positive with essential thrombocytosis, PE, history of PCP pneumonia on prophylactic dose of Bactrim, hypertension, protein calorie malnutrition, hypothyroidism who presents with generalized weakness, confusion and epistaxis.  Patient was  diagnosed with PE in November 2024, on Xarelto and has noticed epistaxis from the right side for the past week.  Also states she has been blowing out clots intermittently.  Patient denies of dark  stool or bleeding per rectum or lashon hematuria.  Patient denies of dizziness or lightheadedness however the daughter reports that she has been confused and in the past her confusion was attributed with tapering dose of Decadron.  Patient denies of fever or chills.  Patient has no headache or blurry vision.  Denies of chest pain shortness of breath or abdominal pain.  Patient denies of nausea vomiting or diarrhea.  In the ED noted to have epistaxis and ENT was contacted and will try to cauterize,    Review of Systems   Constitutional:  Positive for activity change and fatigue.   HENT:  Positive for nosebleeds.    Psychiatric/Behavioral:  Positive for confusion.    All other systems reviewed and are negative.      Historical Information   Past Medical History:   Diagnosis Date    Allergic 2022    Allergic to neomiacin and cephalexin    Cancer (HCC)     lung cancer    Cancer (HCC)     mets to brain    Cancer (HCC)     perianal mass    Cataract Nov. 2023    Due to have durgery June 2024    Essential thrombocytosis (HCC)     controlled with medication    History of transfusion     Hyperlipidemia     Hypertension     Mass in chest     Nodular goiter     Osteoporosis     Peripheral neuropathy     Pneumonia Oct 16, 2023    Also  Feb 2024    Psoriasis     SIRS (systemic inflammatory response syndrome) (HCC) 06/22/2024     Past Surgical History:   Procedure Laterality Date    APPENDECTOMY      BREAST CYST EXCISION Right 2009    COLONOSCOPY  10/31/2018    DXA PROCEDURE (HISTORICAL)  04/21/2017    IR BIOPSY OTHER  9/12/2024    IR LUMBAR PUNCTURE  9/12/2024    MAMMO (HISTORICAL)      8-24-18    MAMMO NEEDLE LOCALIZATION RIGHT (ALL INC) Right 07/13/2009    SKIN LESION EXCISION      bridge of nose     Social History     Tobacco Use    Smoking status: Former     Current packs/day: 1.00     Average packs/day: 1 pack/day for 59.1 years (59.1 ttl pk-yrs)     Types: Cigarettes     Start date: 1966     Passive exposure: Past     Smokeless tobacco: Never    Tobacco comments:     Quit 2010   Vaping Use    Vaping status: Never Used   Substance and Sexual Activity    Alcohol use: Yes     Alcohol/week: 5.0 standard drinks of alcohol     Types: 5 Glasses of wine per week    Drug use: Never    Sexual activity: Not Currently     Partners: Male     Birth control/protection: Post-menopausal     E-Cigarette/Vaping    E-Cigarette Use Never User      E-Cigarette/Vaping Substances    Nicotine No     THC No     CBD No     Flavoring No     Other No     Unknown No      Family history non-contributory  Social History:  Marital Status:    Occupation: Disabled  Patient Pre-hospital Living Situation: Home  Patient Pre-hospital Level of Mobility: walks  Patient Pre-hospital Diet Restrictions: nil    Meds/Allergies   I have reviewed home medications with patient personally.  Prior to Admission medications    Medication Sig Start Date End Date Taking? Authorizing Provider   amLODIPine (NORVASC) 5 mg tablet TAKE 1 TABLET(5 MG) BY MOUTH DAILY 1/20/25   Rafy Angelo MD   dexamethasone (DECADRON) 4 mg tablet Take 1 tablet (4 mg total) by mouth every 8 (eight) hours for 5 days, THEN 1 tablet (4 mg total) every 12 (twelve) hours for 7 days, THEN 1.5 tablets (6 mg total) in the morning for 7 days, THEN 0.5 tablets (2 mg total) 2 (two) times a day for 7 days, THEN 0.5 tablets (2 mg total) in the morning for 7 days. When you get to the 6 mg dosage please take 4 mg in the morning followed by 2 mg in the evening; that would be 1 tablet in the morning followed by 1/2 tablet in the evening for that week.. 1/2/25 2/4/25  Jelean Gamble MD   folic acid (FOLVITE) 1 mg tablet Take 1 tablet (1 mg total) by mouth daily 11/22/24   Rafy Angeol MD   gabapentin (NEURONTIN) 100 mg capsule TAKE 1 CAPSULE BY MOUTH EVERY MORNING, 1 CAPSULE EVERY AFTERNOON AND 3 CAPSULES EVERY NIGHT AT BEDTIME 1/13/25   ZULEYMA Pollard   levETIRAcetam (KEPPRA) 1000 MG tablet  yes TAKE 1 TABLET(1000 MG) BY MOUTH EVERY 12 HOURS 1/14/25   Rafy Angelo MD   levothyroxine 50 mcg tablet TAKE 1 TABLET(50 MCG) BY MOUTH DAILY EARLY MORNING 1/14/25   Rafy Angelo MD   pantoprazole (PROTONIX) 40 mg tablet Take 1 tablet (40 mg total) by mouth daily in the early morning 12/12/24 1/29/25  Rafy Angelo MD   rivaroxaban (Xarelto) 20 mg tablet Take 1 tablet (20 mg total) by mouth daily with breakfast 1/13/25   Rafy Angelo MD   senna (SENOKOT) 8.6 MG tablet Take 1 tablet by mouth daily Takes one every other day    Historical Provider, MD   sodium chloride (OCEAN) 0.65 % nasal spray 1 spray into each nostril as needed for congestion 1/29/25 2/28/25  Destiny Solo MD   sulfamethoxazole-trimethoprim (BACTRIM DS) 800-160 mg per tablet Take 1 tablet by mouth 3 (three) times a week Starting 12/24, you can take one tablet Monday, Wednesday, and Fridays. 1/29/25 7/28/25  Destiny Solo MD   vitamin B-12 (VITAMIN B-12) 1,000 mcg tablet Take 1 tablet (1,000 mcg total) by mouth daily 9/14/23   ZULEYMA Boland     Allergies   Allergen Reactions    Doxycycline Headache    Keflex [Cephalexin] Rash    Neomycin-Polymyxin-Dexameth Eye Swelling       Objective :  Temp:  [97.6 °F (36.4 °C)] 97.6 °F (36.4 °C)  HR:  [94] 94  BP: (124)/(63) 124/63  Resp:  [20] 20  SpO2:  [98 %] 98 %  O2 Device: None (Room air)    Physical Exam   HEENT-PERRLA, moist oral mucosa, epistaxis noted from the right naris,  Neck-supple, no JVD elevation   Respiratory-equal air entry bilaterally, no rales or rhonchi  Cardiovascular system-S1, S2 heard, no murmur or gallops or rubs  Abdomen-soft, nontender, no guarding or rigidity, bowel sounds heard  Extremities-no pedal edema  Peripheral pulses palpable  Musculoskeletal-no contractures  Central nervous system-no acute focal neurological deficit ,no sensory or motor deficit noted.  Skin-no rash noted       Lines/Drains:            Lab Results: I have reviewed the following  results:  Results from last 7 days   Lab Units 02/03/25  1010 01/28/25  0539   WBC Thousand/uL 3.88* 2.03*   HEMOGLOBIN g/dL 8.0* 8.4*   HEMATOCRIT % 25.4* 26.7*   PLATELETS Thousands/uL 130* 112*   BANDS PCT % 2  --    SEGS PCT %  --  85*   LYMPHO PCT % 7* 7*   MONO PCT % 7 3*   EOS PCT % 0 2     Results from last 7 days   Lab Units 02/03/25  1010   SODIUM mmol/L 138   POTASSIUM mmol/L 4.6   CHLORIDE mmol/L 106   CO2 mmol/L 25   BUN mg/dL 15   CREATININE mg/dL 0.96   ANION GAP mmol/L 7   CALCIUM mg/dL 8.8   ALBUMIN g/dL 3.7   TOTAL BILIRUBIN mg/dL 0.27   ALK PHOS U/L 45   ALT U/L 19   AST U/L 13   GLUCOSE RANDOM mg/dL 99     Results from last 7 days   Lab Units 02/03/25  1010   INR  1.91*         Lab Results   Component Value Date    HGBA1C 5.3 07/30/2024           Imaging Results Review: I personally reviewed the following image studies in PACS and associated radiology reports: CT head. My interpretation of the radiology images/reports is: No acute intracranial abnormality noted left posterior frontal parietal and occipital vasogenic edema with 5 mm midline shift to the right without significant change noted,.  Chest x-ray shows increased density in the right upper lobe progressive volume loss in the right upper lobe shifting of the fissure noted,      Administrative Statements       ** Please Note: This note has been constructed using a voice recognition system. **

## 2025-02-03 NOTE — ED ATTENDING ATTESTATION
2/3/2025  I, Meron Rizvi MD, saw and evaluated the patient. I have discussed the patient with the resident/non-physician practitioner and agree with the resident's/non-physician practitioner's findings, Plan of Care, and MDM as documented in the resident's/non-physician practitioner's note, except where noted. All available labs and Radiology studies were reviewed.  I was present for key portions of any procedure(s) performed by the resident/non-physician practitioner and I was immediately available to provide assistance.       At this point I agree with the current assessment done in the Emergency Department.  I have conducted an independent evaluation of this patient a history and physical is as follows:    74-year-old female with a history of metastatic adenocarcinoma of the lung status postchemotherapy and radiation with last chemotherapy on 1/23 with recent admission for severe sepsis due to an unknown source with suspected PJP pneumonia and pulmonary embolism on Xarelto presenting with daughter for evaluation of generalized weakness, epistaxis, and confusion.  History is provided by daughter at the bedside.  States patient has been more confused over the last week when she decreased her Decadron from 4 mg to 2 mg daily.  States confusion has been consistent with diagnosis of her brain metastases.  Has been more forgetful at home.  Patient has also had worsening dyspnea on exertion in addition to occipital headaches.  For the last week, patient has had nosebleeds that are worse in the morning but then slowly taper off throughout the day.  States she blows out large clots but the bleeding will stop with direct pressure.  States blood pressure has been 140s in the morning but then will improve to 112 later in the day and takes amlodipine daily.  Denies recent falls or injury, vision changes, paresthesias, focal weakness, fever, chills, upper respiratory symptoms, chest pain, nausea, vomiting,  abdominal pain, dysuria, hematuria.    Please see resident documentation for histories and review of systems.    Exam: Vital signs nursing notes reviewed  General: Awake, alert, no acute distress  HEENT: Normocephalic, atraumatic, mucous membranes moist, no posterior pharyngeal bleeding, oozing noted from the bilateral naris over the septum  Neck: Supple  Heart: Regular rate and rhythm  Lungs: Clear to auscultation bilaterally without wheezing, rales, or rhonchi  Abdomen: Soft, nontender, nondistended  Extremities: No swelling or deformity line skin: Warm, dry  Neuro: Cranial nerves III, IV, V, VI, VII, 11, 12 intact.  Strength is grossly intact 5/5 bilateral upper and lower extremities    ED Course  ED Course as of 25 1246   Mon 2025   0957 CXR per my interpretation: RUL mass with pulmonary nodules, no consolidation   1003 RBC Urine(!): 30-50   1003 WBC, UA(!): 4-10   1003 Epithelial Cells: Occasional   1003 Bacteria, UA: None Seen   1010 FLU/COVID Rapid Antigen (30 min. TAT) - Preferred screening test in ED  negative   1031 CT head without contrast  IMPRESSION:     No acute intracranial process.     Left posterior frontal, parietal and occipital vasogenic edema with 5 mm midline shift to the right without significant change since 2024 allowing for difference in imaging modalities and head positioning.     1050 Basic metabolic panel  Grossly normal   1050 MAGNESIUM: 2.0   1050 Hepatic function panel  Grossly nomral   1121 hs TnI 0hr: 3   1121 Hemoglobin(!): 8.0   1121 WBC(!): 3.88   1121 Platelet Count(!): 130   1121 XR chest 1 view portable  IMPRESSION:     Increased density of pre-existing right upper lobe region opacity with suggestion of progressive volume loss in the right upper lobe with upward shifting of the fissure.     1132 POCT INR(!): 1.91     [place]-year-old female presenting for evaluation of headache, confusion, dyspnea, and epistaxis.  Differential diagnosis includes  increasing vasogenic edema, intracranial hemorrhage, electrolyte derangement, dehydration, acute coronary syndrome, malignant dysrhythmia, anemia, thrombocytopenia, hypertensive emergency, pneumonia, pneumothorax, viral upper respiratory infection, urinary tract infection.  EKG shows normal sinus rhythm without evidence of ischemia or malignant dysrhythmia.  Initial troponin is 3; low suspicion for ACS at this time.  Chest x-ray per my interpretation shows her known right upper lobe mass without evidence of consolidation or pneumothorax.  Official radiology interpretation shows increased density of the pre-existing opacity with suggestion of progressive volume loss of the right upper lobe with upward shifting; this may be causing the patient's dyspnea symptoms.  CT of the head is negative for acute intracranial process.  Vasogenic edema appears to be stable compared to prior imaging.  CBC shows mild thrombocytopenia, and BMP and magnesium are grossly normal.  Patient also has anemia.  On exam, patient has oozing to nasal septum but no evidence of active bleeding.  Topical oxymetazoline applied.  Patient states she is unable to care for herself well at home, and she may require adjustment of her steroids given increasing headaches and confusion.  Discussed with internal medicine, and patient will be admitted to their service for further care.  Patient and family are in agreement with this plan.    Diagnosis: Metastatic adenocarcinoma of the lung, generalized weakness, ambulatory dysfunction, metabolic encephalopathy, dyspnea, thrombocytopenia, epistaxis  Disposition: Admission

## 2025-02-03 NOTE — ASSESSMENT & PLAN NOTE
Patient daughter reports she noticed confusion with tapering of the steroid with fatigue.  UA is negative for infection  CT of the head shows no acute intracranial process but left posterior frontal parietal and occipital vasogenic edema with 5 mm midline shift to the right without significant change, this was reviewed with radiation oncology and compared to prior Cts  Would start back on Decadron 4 mg twice daily and monitor mental status.  Currently alert oriented x 4 no acute neurofocal deficit noted.  If she has no improvement, would recommend going up to 4mg Q6H.   Would recommend slow taper again but would not taper below 2mg daily. She has been started on Avastin with the hope to bridge her off steroids as radiation oncology recommendation.

## 2025-02-03 NOTE — ASSESSMENT & PLAN NOTE
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

## 2025-02-03 NOTE — ASSESSMENT & PLAN NOTE
Malnutrition Findings:    Continue nutritional supplements,                             BMI Findings:           There is no height or weight on file to calculate BMI.

## 2025-02-03 NOTE — ASSESSMENT & PLAN NOTE
Discussed with radiation oncology, will currently resume 4 mg of Decadron twice daily,  unfortunately the tail end of her taper has been a recurring problem ,  CT today does not show any acute changes compared to December.   Plan as under acute metabolic encephalopathy

## 2025-02-03 NOTE — ED PROVIDER NOTES
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)     2/3/2025 11:29 AM Kaylan Rios Add [C79.9] Metastatic adenocarcinoma (HCC)     2/3/2025 11:33 AM Martine Mcnair Add [R53.1] Generalized weakness     2/3/2025 11:33 AM XuMartine Add [R41.0] Confusion and disorientation     2/3/2025 11:34 AM XuMartineetta Add [C34.90] Adenocarcinoma, lung (HCC)     2/3/2025 11:35 AM XuMartine Modify [C34.90] Adenocarcinoma, lung (HCC) with mets to brain    2/3/2025 11:38 AM Martine Mcnair Add [R26.2] Ambulatory dysfunction     2/3/2025 11:38 AM Martine Mcnair Modify [C79.31] Metastatic cancer to brain (HCC)     2/3/2025 11:38 AM Martine Mcnair Modify [R53.1] Generalized weakness     2/3/2025 12:06 PM Kaylan Rios Add [R04.0] Epistaxis     2/4/2025 10:19 AM Kaylan Rios Add [I26.99] Bilateral pulmonary embolism (HCC)           ED Disposition       ED Disposition   Admit    Condition   Stable    Date/Time   Mon Feb 3, 2025 11:38 AM    Comment   Case was discussed with Blanca and the patient's admission status was agreed to be Admission Status: inpatient status to the service of Dr. Rios .               Assessment & Plan       Medical Decision Making  74-year-old female with history of primary lung adenocarcinoma with mets to the brain, complicated by PJP on Bactrim, Recent diagnosis of PE on Xarelto, CKD, essential thrombocytosis, psoriatic arthritis, who presents to the emergency department for evaluation of generalized weakness and fatigue, worsening headaches, nosebleeds for the last 4 days. She is accompanied by her adult daughter who provides additional history.  Patient lives alone and has been more confused/forgetful. For example, forgets to shut off the water in the sink or where certain things are in her house. She is on decadron taper and within the last week has tapered down to 2mg daily. Patient and daughter both note that patient's weakness/fatigue and confusion all seem to get worse when she tapers down the steroids. She denies any falls or head trauma. Tylenol occasionally helps the headaches. Has also been having daily nosebleeds which she says are slow oozing for hours and occasionally will blow out a large clot. She endorses poor appetite though denies abd pain, n/v/d. She denies chest pain.     She was recently admitted to this hospital from 1/23 to 1/28 for SIRS/sepsis secondary to UTI/pneumonitis/carboplatin side effect. She had a CTA chest on 1/24 that did not show any new PE -- rather stable small chronic emboli. Since being discharged back to her home, she feel she has gotten weaker and more confused.     Fortunately, repeat CTH does not show any new findings, but rather stable mets with surround vasogenic edema consistent with prior scan from Dec. Laboratory workup here with anemia, at baseline. No concerning electrolyte abnormalities. No evidence of ACS. Feel that patient would benefit from PT/OT evaluation for possible placement. Will admit to [facility].     Amount and/or Complexity of Data Reviewed  Labs: ordered. Decision-making details documented in ED Course.  Radiology: ordered.    Risk  OTC drugs.  Decision regarding hospitalization.        ED Course as of 02/04/25 2129   Mon Feb 03, 2025   1031 WBC, UA(!): 4-10   1031 RBC Urine(!): 30-50   1031 Viral panel negative here.    1032 IMPRESSION:     No acute intracranial process.     Left posterior frontal, parietal and occipital vasogenic edema with 5 mm midline shift to the right without significant change since December 27, 2024 allowing for difference in imaging modalities and head positioning.      1049 BMP and LFTs wnl   1111 Hemoglobin(!): 8.0  At baseline   1111 Platelet Count(!): 130  Better than prior; was 112 six days ago   1112 WBC(!): 3.88       Medications   sodium chloride 0.9 % bolus 500 mL (0 mL Intravenous Stopped 2/3/25 1047)   acetaminophen (TYLENOL) tablet 975 mg (975 mg Oral Given 2/3/25 1034)   oxymetazoline (AFRIN) 0.05 % nasal spray 2 spray (2 sprays Each Nare Given 2/3/25 1034)   silver nitrate-potassium nitrate (ARZOL SILVER NITRATE) 75-25 % applicator 4 applicator (4 applicators Topical Given by Other 2/3/25 1239)       ED Risk Strat Scores                                              History of Present Illness       Chief Complaint   Patient presents with    Nose Bleed     Pt presents to the ED with nose bleed on and off over the last few days        Weakness - Generalized     Pt returns to the ED for weakness, confusion, sob. Recent discharge from hospital for sepsis.        Past Medical History:   Diagnosis Date    Allergic 2022    Allergic to neomiacin and cephalexin    Cancer (HCC)     lung cancer    Cancer (HCC)     mets to brain    Cancer (HCC)     perianal mass    Cataract Nov. 2023    Due to have durgery June 2024    Essential thrombocytosis (HCC)     controlled with medication    History of transfusion     Hyperlipidemia     Hypertension     Mass in chest     Nodular goiter     Osteoporosis     Peripheral neuropathy     Pneumonia Oct 16, 2023    Also  Feb 2024    Psoriasis     SIRS (systemic inflammatory response syndrome) (HCC) 06/22/2024      Past Surgical History:   Procedure Laterality Date    APPENDECTOMY      BREAST CYST EXCISION Right 2009    COLONOSCOPY  10/31/2018    DXA PROCEDURE (HISTORICAL)  04/21/2017    IR BIOPSY OTHER  9/12/2024    IR LUMBAR PUNCTURE  9/12/2024    MAMMO (HISTORICAL)      8-24-18    MAMMO NEEDLE LOCALIZATION RIGHT (ALL INC) Right 07/13/2009    SKIN LESION EXCISION      bridge of nose      Family History   Problem Relation Age of Onset    Stroke  Mother     Depression Mother             Hypertension Mother     Hearing loss Mother     Tuberculosis Father     Cancer Brother         lung    Tuberculosis Brother     Coronary artery disease Brother     Hypertension Brother     No Known Problems Daughter     No Known Problems Daughter     No Known Problems Maternal Grandmother     No Known Problems Maternal Grandfather     No Known Problems Paternal Grandmother     No Known Problems Paternal Grandfather     No Known Problems Maternal Aunt     No Known Problems Maternal Aunt     No Known Problems Maternal Aunt     No Known Problems Maternal Aunt     No Known Problems Paternal Aunt     Cancer Brother         Throat canver      Social History     Tobacco Use    Smoking status: Former     Current packs/day: 1.00     Average packs/day: 1 pack/day for 59.1 years (59.1 ttl pk-yrs)     Types: Cigarettes     Start date:      Passive exposure: Past    Smokeless tobacco: Never    Tobacco comments:     Quit    Vaping Use    Vaping status: Never Used   Substance Use Topics    Alcohol use: Yes     Alcohol/week: 5.0 standard drinks of alcohol     Types: 5 Glasses of wine per week    Drug use: Never      E-Cigarette/Vaping    E-Cigarette Use Never User       E-Cigarette/Vaping Substances    Nicotine No     THC No     CBD No     Flavoring No     Other No     Unknown No       I have reviewed and agree with the history as documented.     74-year-old female with history of primary lung adenocarcinoma with mets to the brain, complicated by PJP on Bactrim, Recent diagnosis of PE on Xarelto, CKD, essential thrombocytosis, psoriatic arthritis, who presents to the emergency department for evaluation of generalized weakness and fatigue, worsening headaches, nosebleeds for the last 4 days. She is accompanied by her adult daughter who provides additional history. Patient lives alone and has been more confused/forgetful. For example, forgets to shut off the water in the sink  or where certain things are in her house. She is on decadron taper and within the last week has tapered down to 2mg daily. Patient and daughter both note that patient's weakness/fatigue and confusion all seem to get worse when she tapers down the steroids. She denies any falls or head trauma. Tylenol occasionally helps the headaches. Has also been having daily nosebleeds which she says are slow oozing for hours and occasionally will blow out a large clot. She endorses poor appetite though denies abd pain, n/v/d. She denies chest pain.     She was recently admitted to this hospital from 1/23 to 1/28 for SIRS/sepsis secondary to UTI/pneumonitis/carboplatin side effect. She had a CTA chest on 1/24 that did not show any new PE -- rather stable small chronic emboli.           Review of Systems   Constitutional:  Positive for appetite change and fatigue. Negative for chills and fever.   HENT:  Negative for ear pain and sore throat.    Eyes:  Negative for visual disturbance.   Respiratory:  Positive for shortness of breath. Negative for cough.    Cardiovascular:  Negative for chest pain and leg swelling.   Gastrointestinal:  Negative for abdominal pain, blood in stool, constipation, diarrhea, nausea and vomiting.   Genitourinary:  Negative for dysuria and hematuria.   Musculoskeletal:  Negative for neck pain and neck stiffness.   Neurological:  Positive for weakness (generalized) and headaches. Negative for dizziness, syncope and light-headedness.   Psychiatric/Behavioral:  Positive for confusion.    All other systems reviewed and are negative.          Objective       ED Triage Vitals   Temperature Pulse Blood Pressure Respirations SpO2 Patient Position - Orthostatic VS   02/03/25 0902 02/03/25 0902 02/03/25 0902 02/03/25 0902 02/03/25 0902 02/03/25 0902   97.6 °F (36.4 °C) 94 124/63 20 98 % Sitting      Temp Source Heart Rate Source BP Location FiO2 (%) Pain Score    02/03/25 0902 02/03/25 1200 02/03/25 0902 -- 02/03/25  0902    Oral Monitor Right arm  No Pain      Vitals      Date and Time Temp Pulse SpO2 Resp BP Pain Score FACES Pain Rating User   02/04/25 0756 98 °F (36.7 °C) 77 93 % 18 114/78 -- -- HM   02/04/25 0600 -- -- -- -- -- 6 -- BW   02/04/25 0306 98.5 °F (36.9 °C) 80 -- 18 121/67 -- -- BW   02/03/25 2222 -- -- -- -- -- No Pain -- BW   02/03/25 2222 98.5 °F (36.9 °C) 79 95 % 18 121/67 -- -- WT   02/03/25 1710 -- -- -- -- -- 5 -- DS   02/03/25 1549 -- -- -- -- -- No Pain -- DS   02/03/25 1200 -- 78 96 % 20 127/78 -- -- CR   02/03/25 0902 97.6 °F (36.4 °C) 94 98 % 20 124/63 No Pain -- HVL            Physical Exam  Vitals and nursing note reviewed.   Constitutional:       General: She is not in acute distress.     Appearance: She is well-developed. She is ill-appearing. She is not toxic-appearing or diaphoretic.   HENT:      Head: Normocephalic and atraumatic.      Nose:      Right Nostril: Epistaxis (slow oozing) present. No foreign body or septal hematoma.      Left Nostril: Epistaxis (slow oozing) present. No foreign body or septal hematoma.      Mouth/Throat:      Mouth: Mucous membranes are dry.   Eyes:      Extraocular Movements: Extraocular movements intact.      Conjunctiva/sclera: Conjunctivae normal.   Cardiovascular:      Rate and Rhythm: Normal rate and regular rhythm.      Pulses: Normal pulses.      Heart sounds: Normal heart sounds. No murmur heard.  Pulmonary:      Effort: Pulmonary effort is normal. No respiratory distress.      Breath sounds: Normal breath sounds. No stridor. No wheezing, rhonchi or rales.      Comments: Decreased breath sounds right upper lobe  Abdominal:      General: There is no distension.      Palpations: Abdomen is soft.      Tenderness: There is no abdominal tenderness. There is no guarding or rebound.   Musculoskeletal:         General: No swelling.      Cervical back: Normal range of motion and neck supple. No rigidity or tenderness.      Right lower leg: No edema.      Left lower  leg: No edema.   Skin:     General: Skin is warm and dry.      Capillary Refill: Capillary refill takes less than 2 seconds.      Findings: Bruising (along left forearm, patient states this is 'from chemo') present. No rash.   Neurological:      General: No focal deficit present.      Mental Status: She is alert and oriented to person, place, and time.      Cranial Nerves: No cranial nerve deficit.      Sensory: No sensory deficit.      Motor: No weakness.      Coordination: Coordination normal.      Gait: Gait abnormal (slow, shuffling gait).      Comments: Oriented x3 on my exam    Psychiatric:         Mood and Affect: Mood normal.         Behavior: Behavior normal.         Results Reviewed       Procedure Component Value Units Date/Time    RBC Morphology Reflex Test [[id number]] Collected: 02/03/25 1010    Lab Status: Final result Specimen: Blood from Arm, Right Updated: 02/03/25 1202    CBC and differential [[id number]]  (Abnormal) Collected: 02/03/25 1010    Lab Status: Final result Specimen: Blood from Arm, Right Updated: 02/03/25 1157     WBC 3.88 Thousand/uL      RBC 2.68 Million/uL      Hemoglobin 8.0 g/dL      Hematocrit 25.4 %      MCV 95 fL      MCH 29.9 pg      MCHC 31.5 g/dL      RDW 15.3 %      MPV 10.1 fL      Platelets 130 Thousands/uL     Narrative:      This is an appended report.  These results have been appended to a previously verified report.    Manual Differential(PHLEBS Do Not Order) [[id number]]  (Abnormal) Collected: 02/03/25 1010    Lab Status: Final result Specimen: Blood from Arm, Right Updated: 02/03/25 1157     Segmented % 84 %      Bands % 2 %      Lymphocytes % 7 %      Monocytes % 7 %      Eosinophils % 0 %      Basophils % 0 %      Absolute Neutrophils 3.34 Thousand/uL      Absolute Lymphocytes 0.27 Thousand/uL      Absolute Monocytes 0.27 Thousand/uL      Absolute Eosinophils 0.00 Thousand/uL      Absolute Basophils 0.00 Thousand/uL      Total Counted --     RBC Morphology Present      Platelet Estimate Adequate     Anisocytosis Present    Protime-INR [[id number]]  (Abnormal) Collected: 02/03/25 1010    Lab Status: Final result Specimen: Blood from Arm, Right Updated: 02/03/25 1132     Protime 22.6 seconds      INR 1.91    Narrative:      INR Therapeutic Range    Indication                                             INR Range      Atrial Fibrillation                                               2.0-3.0  Hypercoagulable State                                    2.0.2.3  Left Ventricular Asist Device                            2.0-3.0  Mechanical Heart Valve                                  -    Aortic(with afib, MI, embolism, HF, LA enlargement,    and/or coagulopathy)                                     2.0-3.0 (2.5-3.5)     Mitral                                                             2.5-3.5  Prosthetic/Bioprosthetic Heart Valve               2.0-3.0  Venous thromboembolism (VTE: VT, PE        2.0-3.0    APTT [[id number]]  (Abnormal) Collected: 02/03/25 1010    Lab Status: Final result Specimen: Blood from Arm, Right Updated: 02/03/25 1132     PTT 42 seconds     HS Troponin 0hr (reflex protocol) [[id number]]  (Normal) Collected: 02/03/25 1010    Lab Status: Final result Specimen: Blood from Arm, Right Updated: 02/03/25 1055     hs TnI 0hr 3 ng/L     Basic metabolic panel [[id number]] Collected: 02/03/25 1010    Lab Status: Final result Specimen: Blood from Arm, Right Updated: 02/03/25 1047     Sodium 138 mmol/L      Potassium 4.6 mmol/L      Chloride 106 mmol/L      CO2 25 mmol/L      ANION GAP 7 mmol/L      BUN 15 mg/dL      Creatinine 0.96 mg/dL      Glucose 99 mg/dL      Calcium 8.8 mg/dL      eGFR 58 ml/min/1.73sq m     Narrative:      [employer] guidelines for Chronic Kidney Disease (CKD):     Stage 1 with normal or high GFR (GFR > 90 mL/min/1.73 square meters)    Stage 2 Mild CKD (GFR = 60-89 mL/min/1.73 square meters)    Stage 3A Moderate CKD (GFR = 45-59  mL/min/1.73 square meters)    Stage 3B Moderate CKD (GFR = 30-44 mL/min/1.73 square meters)    Stage 4 Severe CKD (GFR = 15-29 mL/min/1.73 square meters)    Stage 5 End Stage CKD (GFR <15 mL/min/1.73 square meters)  Note: GFR calculation is accurate only with a steady state creatinine    Hepatic function panel [[id number]]  (Normal) Collected: 02/03/25 1010    Lab Status: Final result Specimen: Blood from Arm, Right Updated: 02/03/25 1047     Total Bilirubin 0.27 mg/dL      Bilirubin, Direct 0.09 mg/dL      Alkaline Phosphatase 45 U/L      AST 13 U/L      ALT 19 U/L      Total Protein 6.6 g/dL      Albumin 3.7 g/dL     Magnesium [[id number]]  (Normal) Collected: 02/03/25 1010    Lab Status: Final result Specimen: Blood from Arm, Right Updated: 02/03/25 1047     Magnesium 2.0 mg/dL     FLU/COVID Rapid Antigen (30 min. TAT) - Preferred screening test in ED [[id number]]  (Normal) Collected: 02/03/25 0948    Lab Status: Final result Specimen: Nares from Nose Updated: 02/03/25 1010     SARS COV Rapid Antigen Negative     Influenza A Rapid Antigen Negative     Influenza B Rapid Antigen Negative    Narrative:      This test has been performed using the [employer]idel Ghada 2 FLU+SARS Antigen test under the Emergency Use Authorization (EUA). This test has been validated by the  and verified by the performing laboratory. The Ghada uses lateral flow immunofluorescent sandwich assay to detect SARS-COV, Influenza A and Influenza B Antigen.     The Quidel Ghada 2 SARS Antigen test does not differentiate between SARS-CoV and SARS-CoV-2.     Negative results are presumptive and may be confirmed with a molecular assay, if necessary, for patient management. Negative results do not rule out SARS-CoV-2 or influenza infection and should not be used as the sole basis for treatment or patient management decisions. A negative test result may occur if the level of antigen in a sample is below the limit of detection of this test.      Positive results are indicative of the presence of viral antigens, but do not rule out bacterial infection or co-infection with other viruses.     All test results should be used as an adjunct to clinical observations and other information available to the provider.    FOR PEDIATRIC PATIENTS - copy/paste COVID Guidelines URL to browser: [email or web address]    Urine Microscopic [[id number]]  (Abnormal) Collected: 02/03/25 0948    Lab Status: Final result Specimen: Urine, Clean Catch Updated: 02/03/25 1002     RBC, UA 30-50 /hpf      WBC, UA 4-10 /hpf      Epithelial Cells Occasional /hpf      Bacteria, UA None Seen /hpf      Hyaline Casts, UA 0-3 /lpf     UA w Reflex to Microscopic w Reflex to Culture [[id number]]  (Abnormal) Collected: 02/03/25 0948    Lab Status: Final result Specimen: Urine, Clean Catch Updated: 02/03/25 1000     Color, UA Yellow     Clarity, UA Clear     Specific Gravity, UA 1.026     pH, UA 6.0     Leukocytes, UA Negative     Nitrite, UA Negative     Protein, UA 50 (1+) mg/dl      Glucose, UA Negative mg/dl      Ketones, UA Negative mg/dl      Urobilinogen, UA <2.0 mg/dl      Bilirubin, UA Negative     Occult Blood, UA Moderate            CT head without contrast   Final Interpretation by Cuong Velazquez MD (02/03 1028)      No acute intracranial process.      Left posterior frontal, parietal and occipital vasogenic edema with 5 mm midline shift to the right without significant change since December 27, 2024 allowing for difference in imaging modalities and head positioning.                  Workstation performed: [id number]         XR chest 1 view portable   Final Interpretation by Nathan Mcfarland MD (02/03 1052)      Increased density of pre-existing right upper lobe region opacity with suggestion of progressive volume loss in the right upper lobe with upward shifting of the fissure.            Workstation performed:  [id number]             ECG 12 Lead Documentation Only    Date/Time: 2/3/2025 10:20 AM    Performed by: Martine Mcnair MD  Authorized by: Martine Mcnair MD    Indications / Diagnosis:  Weakness  ECG reviewed by me, the ED Provider: yes    Patient location:  ED  Previous ECG:     Previous ECG:  Compared to current  Rate:     ECG rate:  80    ECG rate assessment: normal    Rhythm:     Rhythm: sinus rhythm    Ectopy:     Ectopy: none    QRS:     QRS axis:  Normal    QRS intervals:  Normal  Conduction:     Conduction: normal    ST segments:     ST segments:  Normal  T waves:     T waves: normal    Comments:      Qtc 440      ED Medication and Procedure Management   Prior to Admission Medications   Prescriptions Last Dose Informant Patient Reported? Taking?   amLODIPine (NORVASC) 5 mg tablet   No No   Sig: TAKE 1 TABLET(5 MG) BY MOUTH DAILY   dexamethasone (DECADRON) 4 mg tablet   No No   Sig: Take 1 tablet (4 mg total) by mouth every 8 (eight) hours for 5 days, THEN 1 tablet (4 mg total) every 12 (twelve) hours for 7 days, THEN 1.5 tablets (6 mg total) in the morning for 7 days, THEN 0.5 tablets (2 mg total) 2 (two) times a day for 7 days, THEN 0.5 tablets (2 mg total) in the morning for 7 days. When you get to the 6 mg dosage please take 4 mg in the morning followed by 2 mg in the evening; that would be 1 tablet in the morning followed by 1/2 tablet in the evening for that week..   folic acid (FOLVITE) 1 mg tablet   No No   Sig: Take 1 tablet (1 mg total) by mouth daily   gabapentin (NEURONTIN) 100 mg capsule   No No   Sig: TAKE 1 CAPSULE BY MOUTH EVERY MORNING, 1 CAPSULE EVERY AFTERNOON AND 3 CAPSULES EVERY NIGHT AT BEDTIME   levETIRAcetam (KEPPRA) 1000 MG tablet   No No   Sig: TAKE 1 TABLET(1000 MG) BY MOUTH EVERY 12 HOURS   levothyroxine 50 mcg tablet   No No   Sig: TAKE 1 TABLET(50 MCG) BY MOUTH DAILY EARLY MORNING   pantoprazole (PROTONIX) 40 mg tablet   No No   Sig: Take 1 tablet (40 mg total) by mouth  daily in the early morning   rivaroxaban (Xarelto) 20 mg tablet   No No   Sig: Take 1 tablet (20 mg total) by mouth daily with breakfast   senna (SENOKOT) 8.6 MG tablet  Self Yes No   Sig: Take 1 tablet by mouth daily Takes one every other day   sodium chloride (OCEAN) 0.65 % nasal spray   No No   Si spray into each nostril as needed for congestion   sulfamethoxazole-trimethoprim (BACTRIM DS) 800-160 mg per tablet   No No   Sig: Take 1 tablet by mouth 3 (three) times a week Starting , you can take one tablet Monday, Wednesday, and .   vitamin B-12 (VITAMIN B-12) 1,000 mcg tablet  Self No No   Sig: Take 1 tablet (1,000 mcg total) by mouth daily      Facility-Administered Medications: None     Discharge Medication List as of 2025 10:58 AM        CONTINUE these medications which have CHANGED    Details   dexamethasone (DECADRON) 1 mg tablet Multiple Dosages:Starting 2025, Until Mon 2/10/2025 at 2359, THEN Starting 2025, Until 2025 at 2359, THEN Starting 2025, Until 2025 at 2359Take 4 tablets (4 mg total) by mouth 2 (two) times a day with meals  for 7 days, THEN 3 tablets (3 mg total) 2 (two) times a day with meals for 7 days, THEN 2 tablets (2 mg total) 2 (two) times a day with meals for 7 days., Normal      pantoprazole (PROTONIX) 40 mg tablet Take 1 tablet (40 mg total) by mouth daily in the early morning, Starting 2025, Until Thu 3/6/2025, Normal           CONTINUE these medications which have NOT CHANGED    Details   amLODIPine (NORVASC) 5 mg tablet TAKE 1 TABLET(5 MG) BY MOUTH DAILY, Starting 2025, Normal      folic acid (FOLVITE) 1 mg tablet Take 1 tablet (1 mg total) by mouth daily, Starting 2024, Normal      gabapentin (NEURONTIN) 100 mg capsule TAKE 1 CAPSULE BY MOUTH EVERY MORNING, 1 CAPSULE EVERY AFTERNOON AND 3 CAPSULES EVERY NIGHT AT BEDTIME, Normal      levETIRAcetam (KEPPRA) 1000 MG tablet TAKE 1 TABLET(1000 MG) BY  MOUTH EVERY 12 HOURS, Normal      levothyroxine 50 mcg tablet TAKE 1 TABLET(50 MCG) BY MOUTH DAILY EARLY MORNING, Normal      rivaroxaban (Xarelto) 20 mg tablet Take 1 tablet (20 mg total) by mouth daily with breakfast, Starting Mon 1/13/2025, Normal      senna (SENOKOT) 8.6 MG tablet Take 1 tablet by mouth daily Takes one every other day, Historical Med      sodium chloride (OCEAN) 0.65 % nasal spray 1 spray into each nostril as needed for congestion, Starting Wed 1/29/2025, Until Fri 2/28/2025 at 2359, Normal      sulfamethoxazole-trimethoprim (BACTRIM DS) 800-160 mg per tablet Take 1 tablet by mouth 3 (three) times a week Starting 12/24, you can take one tablet Monday, Wednesday, and Fridays., Starting Wed 1/29/2025, Until Mon 7/28/2025, Fill Later      vitamin B-12 (VITAMIN B-12) 1,000 mcg tablet Take 1 tablet (1,000 mcg total) by mouth daily, Starting u 9/14/2023, Normal           No discharge procedures on file.  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)     2/3/2025 11:29 AM Kaylan Rios Add [C79.9] Metastatic adenocarcinoma (HCC)     2/3/2025 11:33 AM Martine Mcnair Add [R53.1] Generalized weakness     2/3/2025 11:33 AM Martine Mcnair Add [R41.0] Confusion and disorientation     2/3/2025 11:34 AM Martine Mcnair Add [C34.90] Adenocarcinoma, lung (HCC)     2/3/2025 11:35 AM Martine Mcnair Modify [C34.90] Adenocarcinoma, lung (HCC) with mets to brain    2/3/2025 11:38 AM Martine Mcnair Add [R26.2] Ambulatory dysfunction     2/3/2025 11:38 AM Martine Mcnair Modify [C79.31] Metastatic cancer to brain (HCC)     2/3/2025 11:38 AM Martine Mcnair Modify  [R53.1] Generalized weakness     2/3/2025 12:06 PM Kaylan Rios [R04.0] Epistaxis     2/4/2025 10:19 AM Kaylan Rios [I26.99] Bilateral pulmonary embolism (HCC)                  Martine Mcnair MD  02/04/25 2127       Martine Mcnair MD  02/04/25 2129

## 2025-02-03 NOTE — ASSESSMENT & PLAN NOTE
Presents with recurrent epistaxis for the past week, complains of clot which is intermittent, while blowing the nose, no injury or trauma,  Recently started on Xarelto 20 mg daily for PE,  Will hold Xarelto for now, monitor hemoglobin.  Reached out to ENT, will likely need cauterization of the bleeding vessel on the right nare, will give nasal saline spray,  Apply pressure, ice pack, monitor platelet count which is 130s,  Follow-up with ENT as outpatient.

## 2025-02-04 VITALS
SYSTOLIC BLOOD PRESSURE: 114 MMHG | HEART RATE: 77 BPM | BODY MASS INDEX: 22.35 KG/M2 | HEIGHT: 62 IN | RESPIRATION RATE: 18 BRPM | DIASTOLIC BLOOD PRESSURE: 78 MMHG | WEIGHT: 121.47 LBS | OXYGEN SATURATION: 93 % | TEMPERATURE: 98 F

## 2025-02-04 PROCEDURE — 99239 HOSP IP/OBS DSCHRG MGMT >30: CPT | Performed by: INTERNAL MEDICINE

## 2025-02-04 RX ORDER — PANTOPRAZOLE SODIUM 40 MG/1
40 TABLET, DELAYED RELEASE ORAL
Qty: 30 TABLET | Refills: 0 | Status: ON HOLD | OUTPATIENT
Start: 2025-02-04 | End: 2025-03-06

## 2025-02-04 RX ORDER — DEXAMETHASONE 1 MG
TABLET ORAL
Qty: 126 TABLET | Refills: 0 | Status: ON HOLD | OUTPATIENT
Start: 2025-02-04 | End: 2025-02-25

## 2025-02-04 RX ADMIN — FOLIC ACID 1 MG: 1 TABLET ORAL at 08:45

## 2025-02-04 RX ADMIN — SENNOSIDES 8.6 MG: 8.6 TABLET, FILM COATED ORAL at 08:44

## 2025-02-04 RX ADMIN — PANTOPRAZOLE SODIUM 40 MG: 40 TABLET, DELAYED RELEASE ORAL at 08:44

## 2025-02-04 RX ADMIN — GABAPENTIN 100 MG: 100 CAPSULE ORAL at 06:00

## 2025-02-04 RX ADMIN — LEVETIRACETAM 1000 MG: 500 TABLET, FILM COATED ORAL at 08:45

## 2025-02-04 RX ADMIN — LEVOTHYROXINE SODIUM 50 MCG: 0.05 TABLET ORAL at 06:00

## 2025-02-04 RX ADMIN — AMLODIPINE BESYLATE 5 MG: 2.5 TABLET ORAL at 08:45

## 2025-02-04 RX ADMIN — ACETAMINOPHEN 650 MG: 325 TABLET, FILM COATED ORAL at 06:00

## 2025-02-04 RX ADMIN — DEXAMETHASONE 4 MG: 2 TABLET ORAL at 08:44

## 2025-02-04 RX ADMIN — GABAPENTIN 100 MG: 100 CAPSULE ORAL at 10:58

## 2025-02-04 RX ADMIN — Medication 1000 MCG: at 08:45

## 2025-02-04 RX ADMIN — RIVAROXABAN 20 MG: 20 TABLET, FILM COATED ORAL at 10:58

## 2025-02-04 NOTE — DISCHARGE INSTR - AVS FIRST PAGE
Dear Ayla Nye,     It was our pleasure to care for you here at [facility].  It is our hope that we were always able to exceed the expected standards for your care during your stay.  You were hospitalized due to epistaxis, confusion .  You were cared for on the medsurg floor under the service of Kaylan Rios MD with the [facility] who covers for your primary care physician (PCP), Rafy Angelo MD, while you were hospitalized.  If you have any questions or concerns related to this hospitalization, you may contact us at .  For follow up as well as medication refills, we recommend that you follow up with your primary care physician.  A registered nurse will reach out to you by phone within a few days after your discharge to answer any additional questions that you may have after going home.  However, at this time we provide for you here, the most important instructions / recommendations at discharge:     Notable Medication Adjustments -   Xarelto 20mg resumed to complete 3 months of anticoagulation for PE diagnosed in November 2024, if patient has recurrent epistaxis patient advised to call oncology office Dr. Castro's office , to reduce dose of Xarelto.  Decadron 4 mg twice daily for 7 days  Taper to 3 mg twice daily for next 7 days  Taper to 2 mg twice daily for next 7 days and follow-up with medical oncology and radiation oncology team  Testing Required after Discharge -   CBC, BMP in 1 week  ** Please contact your PCP to request testing orders for any of the testing recommended here **  Important follow up information -   Follow-up with PCP and medical oncology and radiation oncology office  Follow-up with ENT  Other Instructions -   To avoid blowing the nose, elevate head end of the bed, left anterior nasal septum bleeding vessel cauterized bedside, nasal precautions, apply ayr nasal gel twice daily to nostrils  No  nasal packing in place, follow-up with Dr. Kwon upon discharge for reevaluation  Please review this entire after visit summary as additional general instructions including medication list, appointments, activity, diet, any pertinent wound care, and other additional recommendations from your care team that may be provided for you.      Sincerely,     Kaylan Rios MD

## 2025-02-04 NOTE — ASSESSMENT & PLAN NOTE
Presents with recurrent epistaxis for the past week, complains of clot which is intermittent, while blowing the nose, no injury or trauma,  Recently started on Xarelto 20 mg daily for PE,  Will hold Xarelto for now, monitor hemoglobin.  Reached out to ENT, will likely need cauterization of the bleeding vessel on the right nare, will give nasal saline spray,  Apply pressure, ice pack, monitor platelet count which is 130s,  Follow-up with ENT as outpatient.   Pt here for OB F/V. Good fetal movement. Denies LOF, VB.

## 2025-02-04 NOTE — ASSESSMENT & PLAN NOTE
Malnutrition Findings:    Continue nutritional supplements,                             BMI Findings:           Body mass index is 22.22 kg/m².

## 2025-02-04 NOTE — PLAN OF CARE
Problem: PAIN - ADULT  Goal: Verbalizes/displays adequate comfort level or baseline comfort level  Description: Interventions:  - Encourage patient to monitor pain and request assistance  - Assess pain using appropriate pain scale  - Administer analgesics based on type and severity of pain and evaluate response  - Implement non-pharmacological measures as appropriate and evaluate response  - Consider cultural and social influences on pain and pain management  - Notify physician/advanced practitioner if interventions unsuccessful or patient reports new pain  Outcome: Progressing     Problem: INFECTION - ADULT  Goal: Absence or prevention of progression during hospitalization  Description: INTERVENTIONS:  - Assess and monitor for signs and symptoms of infection  - Monitor lab/diagnostic results  - Monitor all insertion sites, i.e. indwelling lines, tubes, and drains  - Monitor endotracheal if appropriate and nasal secretions for changes in amount and color  - [place] appropriate cooling/warming therapies per order  - Administer medications as ordered  - Instruct and encourage patient and family to use good hand hygiene technique  - Identify and instruct in appropriate isolation precautions for identified infection/condition  Outcome: Progressing  Goal: Absence of fever/infection during neutropenic period  Description: INTERVENTIONS:  - Monitor WBC    Outcome: Progressing     Problem: DISCHARGE PLANNING  Goal: Discharge to home or other facility with appropriate resources  Description: INTERVENTIONS:  - Identify barriers to discharge w/patient and caregiver  - Arrange for needed discharge resources and transportation as appropriate  - Identify discharge learning needs (meds, wound care, etc.)  - Arrange for interpretive services to assist at discharge as needed  - Refer to Case Management Department for coordinating discharge planning if the patient needs post-hospital services based on physician/advanced  practitioner order or complex needs related to functional status, cognitive ability, or social support system  Outcome: Progressing     Problem: Knowledge Deficit  Goal: Patient/family/caregiver demonstrates understanding of disease process, treatment plan, medications, and discharge instructions  Description: Complete learning assessment and assess knowledge base.  Interventions:  - Provide teaching at level of understanding  - Provide teaching via preferred learning methods  Outcome: Progressing

## 2025-02-06 ENCOUNTER — HOSPITAL ENCOUNTER (OUTPATIENT)
Dept: INFUSION CENTER | Facility: HOSPITAL | Age: 75
End: 2025-02-06
Attending: INTERNAL MEDICINE

## 2025-02-10 ENCOUNTER — APPOINTMENT (OUTPATIENT)
Dept: LAB | Facility: CLINIC | Age: 75
End: 2025-02-10
Payer: MEDICARE

## 2025-02-10 DIAGNOSIS — C79.9 METASTATIC ADENOCARCINOMA (HCC): ICD-10-CM

## 2025-02-10 LAB
ALBUMIN SERPL BCG-MCNC: 4 G/DL (ref 3.5–5)
ALP SERPL-CCNC: 52 U/L (ref 34–104)
ALT SERPL W P-5'-P-CCNC: 11 U/L (ref 7–52)
ANION GAP SERPL CALCULATED.3IONS-SCNC: 8 MMOL/L (ref 4–13)
ANISOCYTOSIS BLD QL SMEAR: PRESENT
AST SERPL W P-5'-P-CCNC: 12 U/L (ref 13–39)
BASOPHILS # BLD MANUAL: 0 THOUSAND/UL (ref 0–0.1)
BASOPHILS NFR MAR MANUAL: 0 % (ref 0–1)
BILIRUB SERPL-MCNC: 0.28 MG/DL (ref 0.2–1)
BUN SERPL-MCNC: 22 MG/DL (ref 5–25)
CALCIUM SERPL-MCNC: 9.5 MG/DL (ref 8.4–10.2)
CHLORIDE SERPL-SCNC: 105 MMOL/L (ref 96–108)
CO2 SERPL-SCNC: 27 MMOL/L (ref 21–32)
CREAT SERPL-MCNC: 0.98 MG/DL (ref 0.6–1.3)
CRP SERPL QL: 29.9 MG/L
EOSINOPHIL # BLD MANUAL: 0 THOUSAND/UL (ref 0–0.4)
EOSINOPHIL NFR BLD MANUAL: 0 % (ref 0–6)
ERYTHROCYTE [DISTWIDTH] IN BLOOD BY AUTOMATED COUNT: 15.9 % (ref 11.6–15.1)
ERYTHROCYTE [SEDIMENTATION RATE] IN BLOOD: 86 MM/HOUR (ref 0–29)
GFR SERPL CREATININE-BSD FRML MDRD: 57 ML/MIN/1.73SQ M
GLUCOSE P FAST SERPL-MCNC: 97 MG/DL (ref 65–99)
HCT VFR BLD AUTO: 29.7 % (ref 34.8–46.1)
HGB BLD-MCNC: 9.1 G/DL (ref 11.5–15.4)
LDH SERPL-CCNC: 412 U/L (ref 140–271)
LIPASE SERPL-CCNC: 55 U/L (ref 11–82)
LYMPHOCYTES # BLD AUTO: 0.68 THOUSAND/UL (ref 0.6–4.47)
LYMPHOCYTES # BLD AUTO: 7 % (ref 14–44)
MCH RBC QN AUTO: 29.5 PG (ref 26.8–34.3)
MCHC RBC AUTO-ENTMCNC: 30.6 G/DL (ref 31.4–37.4)
MCV RBC AUTO: 96 FL (ref 82–98)
METAMYELOCYTE ABSOLUTE CT: 0.39 THOUSAND/UL (ref 0–0.1)
METAMYELOCYTES NFR BLD MANUAL: 4 % (ref 0–1)
MONOCYTES # BLD AUTO: 0.19 THOUSAND/UL (ref 0–1.22)
MONOCYTES NFR BLD: 2 % (ref 4–12)
MYELOCYTE ABSOLUTE CT: 0.68 THOUSAND/UL (ref 0–0.1)
MYELOCYTES NFR BLD MANUAL: 7 % (ref 0–1)
NEUTROPHILS # BLD MANUAL: 7.74 THOUSAND/UL (ref 1.85–7.62)
NEUTS SEG NFR BLD AUTO: 80 % (ref 43–75)
PLATELET # BLD AUTO: 405 THOUSANDS/UL (ref 149–390)
PLATELET BLD QL SMEAR: ABNORMAL
PMV BLD AUTO: 8.7 FL (ref 8.9–12.7)
POLYCHROMASIA BLD QL SMEAR: PRESENT
POTASSIUM SERPL-SCNC: 4.2 MMOL/L (ref 3.5–5.3)
PROT SERPL-MCNC: 7 G/DL (ref 6.4–8.4)
RBC # BLD AUTO: 3.08 MILLION/UL (ref 3.81–5.12)
RBC MORPH BLD: PRESENT
SODIUM SERPL-SCNC: 140 MMOL/L (ref 135–147)
T3FREE SERPL-MCNC: 2.19 PG/ML (ref 2.5–3.9)
TSH SERPL DL<=0.05 MIU/L-ACNC: 0.86 UIU/ML (ref 0.45–4.5)
WBC # BLD AUTO: 9.68 THOUSAND/UL (ref 4.31–10.16)

## 2025-02-10 PROCEDURE — 83690 ASSAY OF LIPASE: CPT

## 2025-02-10 PROCEDURE — 85027 COMPLETE CBC AUTOMATED: CPT

## 2025-02-10 PROCEDURE — 80053 COMPREHEN METABOLIC PANEL: CPT

## 2025-02-10 PROCEDURE — 82024 ASSAY OF ACTH: CPT

## 2025-02-10 PROCEDURE — 84443 ASSAY THYROID STIM HORMONE: CPT

## 2025-02-10 PROCEDURE — 85652 RBC SED RATE AUTOMATED: CPT

## 2025-02-10 PROCEDURE — 86140 C-REACTIVE PROTEIN: CPT

## 2025-02-10 PROCEDURE — 85007 BL SMEAR W/DIFF WBC COUNT: CPT

## 2025-02-10 PROCEDURE — 83615 LACTATE (LD) (LDH) ENZYME: CPT

## 2025-02-10 PROCEDURE — 84481 FREE ASSAY (FT-3): CPT

## 2025-02-11 ENCOUNTER — RESULTS FOLLOW-UP (OUTPATIENT)
Age: 75
End: 2025-02-11

## 2025-02-11 ENCOUNTER — OFFICE VISIT (OUTPATIENT)
Dept: HEMATOLOGY ONCOLOGY | Facility: CLINIC | Age: 75
End: 2025-02-11
Payer: MEDICARE

## 2025-02-11 VITALS
BODY MASS INDEX: 22.45 KG/M2 | SYSTOLIC BLOOD PRESSURE: 126 MMHG | HEART RATE: 91 BPM | TEMPERATURE: 97.3 F | WEIGHT: 122 LBS | OXYGEN SATURATION: 97 % | DIASTOLIC BLOOD PRESSURE: 88 MMHG | RESPIRATION RATE: 20 BRPM | HEIGHT: 62 IN

## 2025-02-11 DIAGNOSIS — C34.11 MALIGNANT NEOPLASM OF UPPER LOBE, RIGHT BRONCHUS OR LUNG (HCC): Primary | ICD-10-CM

## 2025-02-11 LAB — ACTH PLAS-MCNC: <1.5 PG/ML (ref 7.2–63.3)

## 2025-02-11 PROCEDURE — 99215 OFFICE O/P EST HI 40 MIN: CPT | Performed by: INTERNAL MEDICINE

## 2025-02-11 RX ORDER — PALONOSETRON 0.05 MG/ML
0.25 INJECTION, SOLUTION INTRAVENOUS ONCE
Status: CANCELLED | OUTPATIENT
Start: 2025-02-13

## 2025-02-11 RX ORDER — SODIUM CHLORIDE 9 MG/ML
20 INJECTION, SOLUTION INTRAVENOUS ONCE
Status: CANCELLED | OUTPATIENT
Start: 2025-02-13

## 2025-02-11 NOTE — PROGRESS NOTES
Name: Ayla Nye      : 1950      MRN: [id number]  Encounter Provider: Rosalinda Castro MD  Encounter Date: 2025   Encounter department: 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 Patient with history of brain metastases from lung cancers, s/p SRS in 2024 and in 2025. Patient has been on steroids with decadron for many weeks now. Whenever steroids have been tapered to as low as 2 mg daily, patient would have episodes of worsening fatigue and confusion, similar to what was present during this admission.  CT brain showed stable findings with left posterior frontal, parietal, and occipital vasogenic edema with 5 mm midline shift to the right.      Last visit 2025     Patient continues with Decadron, will titrate down to 6 mg starting tomorrow.  She is also scheduled to get SRT on 2025.        Her case was discussed in neuro-oncology board and recommendations were for Avastin due to concerns  of radiation induced vasogenic edema as she is not responding to steroids.     Concern with bleeding risk/on Xarelto-will monitor closely     Complaining of worsening mid thoracic back pain radiating under ribs bilaterally-will get CT chest but last imaging 31 Dec without active PE, no bony mets in that area.      Patient has appointment on 1/27/2025, encouraged to keep appointment and will be seen in office postradiation therapy.        Plan is to start her on low-dose Avastin and resume carboplatin and pemetrexed.  Will continue to hold on pembrolizumab as patient continues to be on steroids.     Will dose Avastin at 5 mg/kg to start one week after SBRT     SBRT later this week; 16th Jan      Will monitor closely for bleeding, BP, MS changes     Continue to wean decadron ; concern with AI with rapid taper      Admitted again 2/3-2/4/2025 for balance issue after 1/23/2025 admission for fever/ chills with no source     2/11/2025    To date, had 3 cycles of Carbo/Pemetrexed but was unable to get Avastin as had fever/chills post therapy    Will attempt again to treat 2/13/2025    Carboplatin dose AUC 4 Pemetrexed 300 mg/m2 rather than 500 mg/m2 and acceptable to give with GFR under 40 and will start Avastin and can be given with calculated clearance by Cockcroft and Gault     CrCl per Cockcroft and Gault is 44  GFR is 61per C/G     Both are acceptable to give these agent    Need to monitor urine protein which is more of an issue per the Avastin label the clearance     Monitor urine protein /BP/side effects     Doing better; currently on 3 mg bid decadron and will decrease to 2 mg bid next week-will not decrease further       Acute metabolic encephalopathy  Patient daughter reports she noticed confusion with tapering of the steroid with fatigue.  UA is negative for infection  CT of the head shows no acute intracranial process but left posterior frontal parietal and occipital vasogenic edema with 5 mm midline shift to  the right without significant change, this was reviewed with radiation oncology and compared to prior Cts  Would start back on Decadron 4 mg twice daily and monitor mental status.  Currently alert oriented x 4 no acute neurofocal deficit noted.  If she has no improvement, would recommend going up to 4mg Q6H.   Would recommend slow taper again but would not taper below 2mg daily. She has been started on Avastin with the hope to bridge her off steroids as radiation oncology recommendation.    Essential thrombocytosis (HCC)      Plts 405, following; no treatment since getting chemotherapy       Hypothyroidism, unspecified type    Continue home dose of 50 mcg of levothyroxine            History of Pneumocystis jirovecii pneumonia  Continue Bactrim 1 tab 3 times a week        Epistaxis  Presents with recurrent epistaxis for the past week, complains of clot which is intermittent, while blowing the nose, no injury or trauma,  Recently started on Xarelto 20 mg daily for PE,-was held and restarted   Reached out to ENT, and cauterization of the bleeding vessel on the right nare stopped the bleeding.  ,Has not had issues and restarted Xarelto     Summary/Recommendations    Start C4 Carboplatin/Pemetrexed/Avastin 2/13/2025    Follow closely    Continue decadron-stop taper once 2 mg bid as when attempt to go to 2 mg daily, then balance issues    Follow up 1 week           History of Present Illness   Chief Complaint   Patient presents with    Follow-up     HPI:  Ayla Nye is a 74 y.o. female with medical hx remarkable of HTN, HLD, Nodular Goiter, Osteoprosis, Psoriasis, essential thrombocytosis, Pulmonary Embolism, lung adenocarcinoma as outlined below, and had superficial perianal mass biopsy showed squamous cell carcinoma s/p R gluteus radiation therapy completed 10/18/2024 .      history of [id number] (IIIB) lung adenocarcinoma of the right upper lobe, s/p concurrent chemoRT completing on 7/5/24. She completed a course  of SRS on 8/8/24 after MRI brain demonstrated five supra- and infratentorial enhancing lesions compatible with metastases. She also completed a course of palliative radiation to right gluteus (for superficial squamous cell lesion) on 10/18/24.      On treatment with Carboplatin AUC 5 Pemetrexed 500 mg/m2 and Pembrolizumab 200 mg IV every 3 weeks x 4 cycles. Cycle 2 was completed on 11/14/24.  Unfortunately her therapy has been interrupted due to frequent hospitalizations.      2/11/2025    As above has had several admissions for fevers/chills without source as well as balance issues    Will attempt to give C4 chemotherapy this week.  Has not had Avastin yet as had fevers/chills with last chemo    Doing better, managing at home.  Balance better with higher dose steroids.  Significant moon face.          Oncology History     Oncology History   Cancer Staging   Metastatic adenocarcinoma (HCC)  Staging form: Lung, AJCC 8th Edition  - Clinical stage from 4/15/2024: Stage IIIB (cT2b, cN3, cM0) - Signed by Rogelio Beebe DO on 4/15/2024  - Clinical: Stage IV (cM1) - Signed by Honey Gamboa MD on 9/23/2024  Oncology History   Metastatic adenocarcinoma (HCC)   2024 Initial Diagnosis    Primary lung adenocarcinoma, right (HCC)     3/26/2024 Biopsy    A-C. Lymph Node, Level 4R :    - Metastatic non-small cell carcinoma, most compatible with a primary lung adenocarcinoma; see note.       D-F. Lymph Node, Level 10R:    - Metastatic non-small cell carcinoma; see note.       G-I. Lymph Node, Level 4L:    - Metastatic non-small cell carcinoma; see note.       4/15/2024 -  Cancer Staged    Staging form: Lung, AJCC 8th Edition  - Clinical stage from 4/15/2024: Stage IIIB (cT2b, cN3, cM0) - Signed by Rogelio Beebe DO on 4/15/2024       5/23/2024 - 7/2/2024 Chemotherapy    alteplase (CATHFLO), 2 mg, Intracatheter, Every 1 Minute as needed, 6 of 6 cycles  CARBOplatin (PARAPLATIN) IVPB (GOG AUC DOSING), 130.4 mg,  Intravenous, Once, 6 of 6 cycles  Administration: 130.4 mg (5/23/2024), 144.8 mg (5/30/2024), 138.4 mg (6/6/2024), 144.8 mg (6/13/2024), 132 mg (6/21/2024), 143.6 mg (7/2/2024)  PACLItaxel (TAXOL) chemo IVPB, 45 mg/m2 = 67.2 mg (90 % of original dose 50 mg/m2), Intravenous, Once, 6 of 6 cycles  Dose modification: 45 mg/m2 (original dose 50 mg/m2, Cycle 1, Reason: Anticipated Tolerance)  Administration: 67.2 mg (5/23/2024), 67.2 mg (5/30/2024), 67.2 mg (6/6/2024), 67.2 mg (6/13/2024), 67.2 mg (6/21/2024), 67.2 mg (7/2/2024)     5/23/2024 - 7/5/2024 Radiation    Treatment:  Course: C1    Plan ID Energy Fractions Dose per Fraction (cGy) Dose Correction (cGy) Total Dose Delivered (cGy) Elapsed Days   R Lung_Hilum 6X 30 / 30 200 0 6,000 43      Treatment dates:  C1: 5/23/2024 - 7/5/2024 8/8/2024 - 8/8/2024 Radiation    SRS 5 PTVs   2000 cGy     9/12/2024 Biopsy    Mass, Superficial perianal mass:  - Squamous cell carcinoma.     Note: The patient's prior lung EBUS sampling shows the tumor to stain for TTF1 and Napsin with absent p40 expression.  The current perianal mass sampling shows diffuse p40 expression with absent TTF1 expression.  This may represent a primary anal/perianal squamous cell carcinoma versus a possible metastatic combined lung tumor (adenocarcinoma and previously unsampled squamous component).  Suggest clinical correlation and appropriate follow-up.         9/23/2024 -  Cancer Staged    Staging form: Lung, AJCC 8th Edition  - Clinical: Stage IV (cM1) - Signed by Honey Gamboa MD on 9/23/2024       10/1/2024 - 10/18/2024 Radiation    Course: C3    Plan ID Energy Fractions Dose per Fraction (cGy) Dose Correction (cGy) Total Dose Delivered (cGy) Elapsed Days   R Gluteus:1 10X/6X 14 / 15 300 0 4,200 17      Treatment Dates:  10/1/2024 - 10/18/2024.       10/7/2024 -  Chemotherapy    cyanocobalamin, 1,000 mcg, Intramuscular, Once, 2 of 3 cycles  Administration: 1,000 mcg (8/19/2024), 1,000 mcg  (1/23/2025)  alteplase (CATHFLO), 2 mg, Intracatheter, Every 1 Minute as needed, 3 of 6 cycles  palonosetron (ALOXI), 0.25 mg, Intravenous, Once, 1 of 4 cycles  Administration: 0.25 mg (1/23/2025)  fosaprepitant (EMEND) IVPB, 150 mg, Intravenous, Once, 3 of 6 cycles  Administration: 150 mg (10/7/2024), 150 mg (1/23/2025), 150 mg (11/14/2024)  CARBOplatin (PARAPLATIN) IVPB ([facility] AUC DOSING), 347 mg, Intravenous, Once, 3 of 6 cycles  Administration: 347 mg (10/7/2024), 256.8 mg (1/23/2025), 346 mg (11/14/2024)  bevacizumab (AVASTIN) IVPB, 5 mg/kg = 280 mg (100 % of original dose 5 mg/kg), Intravenous, Once, 0 of 3 cycles  Dose modification: 5 mg/kg (original dose 5 mg/kg, Cycle 4, Reason: Anticipated Tolerance)  pemetrexed (ALIMTA) chemo infusion, 735 mg, Intravenous, Once, 3 of 6 cycles  Dose modification: 300 mg/m2 (original dose 500 mg/m2, Cycle 4, Reason: Anticipated Tolerance)  Administration: 700 mg (10/7/2024), 700 mg (1/23/2025), 700 mg (11/14/2024)  pembrolizumab (KEYTRUDA) IVPB, 200 mg, Intravenous, Once, 2 of 2 cycles  Administration: 200 mg (11/14/2024), 200 mg (10/7/2024)     Metastatic cancer to brain (HCC)   7/29/2024 Initial Diagnosis    Metastatic cancer to brain (HCC)     8/8/2024 - 8/8/2024 Radiation    SRS 5 PTVs   2000 cGy     9/12/2024 Biopsy    Mass, Superficial perianal mass:  - Squamous cell carcinoma.     Note: The patient's prior lung EBUS sampling shows the tumor to stain for TTF1 and Napsin with absent p40 expression.  The current perianal mass sampling shows diffuse p40 expression with absent TTF1 expression.  This may represent a primary anal/perianal squamous cell carcinoma versus a possible metastatic combined lung tumor (adenocarcinoma and previously unsampled squamous component).  Suggest clinical correlation and appropriate follow-up.         10/1/2024 - 10/18/2024 Radiation    Course: C3    Plan ID Energy Fractions Dose per Fraction (cGy) Dose Correction (cGy) Total Dose Delivered  "(cGy) Elapsed Days   R Gluteus:1 10X/6X 14 / 15 300 0 4,200 17      Treatment Dates:  10/1/2024 - 10/18/2024.          Pertinent Medical History   02/11/25:     CRT       C1 5/23/2024  C2  5/30/2024  C3 6/6/2024  C4 6/13/2024  C5 6/20/2024  C6 7/2/2024 at [place] f/b hospitalization for neutropenic fever     First line metastatic treatment     Carboplatin AUC 5 Pemetrexed 500 mg/m2 and Pembrolizumab 200 mg IV every 3 weeks x 4 cycles     C1 held due to hospital admission  C1 held due to radiation and concurrent high-dose steroid use     C1 now 10/7/2024  C2 10/28/2024-held due to admission     C2 11/14/2024- completed      No treatment since 11/14/2024 due to multiple admissions/toxicities, PCP pneumonia, PE on Xarelto        Review of Systems   Constitutional:  Positive for fatigue.   HENT:  Positive for rhinorrhea.    Eyes:  Positive for itching.   Respiratory: Negative.     Cardiovascular: Negative.    Gastrointestinal: Negative.    Genitourinary: Negative.    Musculoskeletal: Negative.    Neurological:  Positive for weakness, light-headedness and headaches.   Hematological:  Bruises/bleeds easily.   Psychiatric/Behavioral: Negative.             Objective   /88 (BP Location: Left arm, Patient Position: Sitting, Cuff Size: Adult)   Pulse 91   Temp (!) 97.3 °F (36.3 °C) (Temporal)   Resp 20   Ht 5' 2\" (1.575 m)   Wt 55.3 kg (122 lb)   SpO2 97%   BMI 22.31 kg/m²     Pain Screening:  Pain Score: 0-No pain  ECOG   1  Physical Exam  Vitals reviewed.   Constitutional:       Appearance: Normal appearance.   HENT:      Head: Normocephalic.      Nose: Nose normal.      Mouth/Throat:      Mouth: Mucous membranes are moist.   Eyes:      Pupils: Pupils are equal, round, and reactive to light.   Cardiovascular:      Rate and Rhythm: Normal rate.      Pulses: Normal pulses.   Pulmonary:      Effort: Pulmonary effort is normal.   Abdominal:      General: Bowel sounds are normal.   Musculoskeletal:         " General: Normal range of motion.      Cervical back: Normal range of motion.   Skin:     General: Skin is warm.   Neurological:      General: No focal deficit present.      Mental Status: She is alert.   Psychiatric:         Mood and Affect: Mood normal.         Labs: I have reviewed the following labs:  Lab Results   Component Value Date/Time    WBC 9.68 02/10/2025 09:20 AM    RBC 3.08 (L) 02/10/2025 09:20 AM    Hemoglobin 9.1 (L) 02/10/2025 09:20 AM    Hematocrit 29.7 (L) 02/10/2025 09:20 AM    MCV 96 02/10/2025 09:20 AM    MCH 29.5 02/10/2025 09:20 AM    RDW 15.9 (H) 02/10/2025 09:20 AM    Platelets 405 (H) 02/10/2025 09:20 AM    Segmented % 85 (H) 01/28/2025 05:39 AM    Lymphocytes % 7 (L) 02/10/2025 09:20 AM    Lymphocytes % 7 (L) 01/28/2025 05:39 AM    Monocytes % 2 (L) 02/10/2025 09:20 AM    Monocytes % 3 (L) 01/28/2025 05:39 AM    Eosinophils % 0 02/10/2025 09:20 AM    Eosinophils Relative 2 01/28/2025 05:39 AM    Basophils % 0 02/10/2025 09:20 AM    Basophils Relative 1 01/28/2025 05:39 AM    Immature Grans % 2 01/28/2025 05:39 AM    Absolute Neutrophils 1.74 (L) 01/28/2025 05:39 AM     Lab Results   Component Value Date/Time    Potassium 4.2 02/10/2025 09:20 AM    Chloride 105 02/10/2025 09:20 AM    CO2 27 02/10/2025 09:20 AM    BUN 22 02/10/2025 09:20 AM    Creatinine 0.98 02/10/2025 09:20 AM    Glucose, Fasting 97 02/10/2025 09:20 AM    Calcium 9.5 02/10/2025 09:20 AM    Corrected Calcium 9.3 01/28/2025 05:39 AM    AST 12 (L) 02/10/2025 09:20 AM    ALT 11 02/10/2025 09:20 AM    Alkaline Phosphatase 52 02/10/2025 09:20 AM    Total Protein 7.0 02/10/2025 09:20 AM    Albumin 4.0 02/10/2025 09:20 AM    Total Bilirubin 0.28 02/10/2025 09:20 AM    eGFR 57 02/10/2025 09:20 AM             Administrative Statements   I have spent a total time of 40 minutes in caring for this patient on the day of the visit/encounter including Impressions, Counseling / Coordination of care, Documenting in the medical record,  Reviewing / ordering tests, medicine, procedures  , and Obtaining or reviewing history  .

## 2025-02-13 ENCOUNTER — HOSPITAL ENCOUNTER (OUTPATIENT)
Dept: INFUSION CENTER | Facility: HOSPITAL | Age: 75
Discharge: HOME | End: 2025-02-13
Payer: MEDICARE

## 2025-02-13 VITALS
BODY MASS INDEX: 22.07 KG/M2 | HEIGHT: 62 IN | TEMPERATURE: 97.2 F | DIASTOLIC BLOOD PRESSURE: 80 MMHG | OXYGEN SATURATION: 97 % | HEART RATE: 80 BPM | SYSTOLIC BLOOD PRESSURE: 136 MMHG | WEIGHT: 119.93 LBS | RESPIRATION RATE: 18 BRPM

## 2025-02-13 DIAGNOSIS — C79.9 METASTATIC ADENOCARCINOMA (HCC): Primary | ICD-10-CM

## 2025-02-13 PROCEDURE — 96411 CHEMO IV PUSH ADDL DRUG: CPT

## 2025-02-13 PROCEDURE — 96367 TX/PROPH/DG ADDL SEQ IV INF: CPT

## 2025-02-13 PROCEDURE — 96375 TX/PRO/DX INJ NEW DRUG ADDON: CPT

## 2025-02-13 PROCEDURE — 96417 CHEMO IV INFUS EACH ADDL SEQ: CPT

## 2025-02-13 PROCEDURE — 96413 CHEMO IV INFUSION 1 HR: CPT

## 2025-02-13 RX ORDER — PALONOSETRON 0.05 MG/ML
0.25 INJECTION, SOLUTION INTRAVENOUS ONCE
Status: COMPLETED | OUTPATIENT
Start: 2025-02-13 | End: 2025-02-13

## 2025-02-13 RX ORDER — ACETAMINOPHEN 325 MG/1
650 TABLET ORAL EVERY 6 HOURS PRN
Status: CANCELLED
Start: 2025-02-13

## 2025-02-13 RX ORDER — ACETAMINOPHEN 325 MG/1
650 TABLET ORAL EVERY 6 HOURS PRN
Status: DISCONTINUED | OUTPATIENT
Start: 2025-02-13 | End: 2025-02-16 | Stop reason: HOSPADM

## 2025-02-13 RX ORDER — SODIUM CHLORIDE 9 MG/ML
20 INJECTION, SOLUTION INTRAVENOUS ONCE
Status: COMPLETED | OUTPATIENT
Start: 2025-02-13 | End: 2025-02-13

## 2025-02-13 RX ADMIN — SODIUM CHLORIDE 20 ML/HR: 0.9 INJECTION, SOLUTION INTRAVENOUS at 10:17

## 2025-02-13 RX ADMIN — CARBOPLATIN 278.4 MG: 10 INJECTION, SOLUTION INTRAVENOUS at 13:03

## 2025-02-13 RX ADMIN — BEVACIZUMAB 280 MG: 100 INJECTION, SOLUTION INTRAVENOUS at 11:20

## 2025-02-13 RX ADMIN — PALONOSETRON HYDROCHLORIDE 0.25 MG: 0.25 INJECTION INTRAVENOUS at 10:13

## 2025-02-13 RX ADMIN — PEMETREXED DISODIUM 468 MG: 500 INJECTION, POWDER, LYOPHILIZED, FOR SOLUTION INTRAVENOUS at 11:00

## 2025-02-13 RX ADMIN — FOSAPREPITANT 150 MG: 150 INJECTION, POWDER, LYOPHILIZED, FOR SOLUTION INTRAVENOUS at 10:17

## 2025-02-13 RX ADMIN — ACETAMINOPHEN 650 MG: 325 TABLET ORAL at 13:38

## 2025-02-13 NOTE — PROGRESS NOTES
Per Dr. Castro [place]willow to proceed with treatment today with CrCl 35.2ml/min and saw patient two days ago aware of last UA 2/3/25 okay with treatment.

## 2025-02-13 NOTE — PROGRESS NOTES
Ayla Nye  tolerated treatment well with no complications.      Ayla Nye is aware of future appt on 03/06 at 0930.     AVS printed and given to Ayla Nye:  No (Declined by Ayla Nye)

## 2025-02-14 ENCOUNTER — HOSPITAL ENCOUNTER (INPATIENT)
Facility: HOSPITAL | Age: 75
LOS: 5 days | Discharge: HOME HEALTH | End: 2025-02-19
Attending: EMERGENCY MEDICINE | Admitting: INTERNAL MEDICINE
Payer: MEDICARE

## 2025-02-14 ENCOUNTER — APPOINTMENT (EMERGENCY)
Dept: RADIOLOGY | Facility: HOSPITAL | Age: 75
End: 2025-02-14
Payer: MEDICARE

## 2025-02-14 DIAGNOSIS — A41.9 SEPSIS (HCC): ICD-10-CM

## 2025-02-14 DIAGNOSIS — T45.1X5A COMPLICATION OF CHEMOTHERAPY: ICD-10-CM

## 2025-02-14 DIAGNOSIS — I26.99 BILATERAL PULMONARY EMBOLISM (HCC): ICD-10-CM

## 2025-02-14 DIAGNOSIS — R50.9 FEVER: Primary | ICD-10-CM

## 2025-02-14 PROBLEM — N17.9 AKI (ACUTE KIDNEY INJURY) (HCC): Status: ACTIVE | Noted: 2025-02-14

## 2025-02-14 PROBLEM — K21.9 GERD (GASTROESOPHAGEAL REFLUX DISEASE): Status: ACTIVE | Noted: 2025-02-14

## 2025-02-14 LAB
ALBUMIN SERPL BCG-MCNC: 3.7 G/DL (ref 3.5–5)
ALP SERPL-CCNC: 51 U/L (ref 34–104)
ALT SERPL W P-5'-P-CCNC: 14 U/L (ref 7–52)
ANION GAP SERPL CALCULATED.3IONS-SCNC: 10 MMOL/L (ref 4–13)
ANISOCYTOSIS BLD QL SMEAR: PRESENT
APTT PPP: 34 SECONDS (ref 23–34)
AST SERPL W P-5'-P-CCNC: 19 U/L (ref 13–39)
ATRIAL RATE: 109 BPM
BACTERIA UR QL AUTO: ABNORMAL /HPF
BASOPHILS # BLD MANUAL: 0 THOUSAND/UL (ref 0–0.1)
BASOPHILS NFR MAR MANUAL: 0 % (ref 0–1)
BILIRUB SERPL-MCNC: 0.4 MG/DL (ref 0.2–1)
BILIRUB UR QL STRIP: NEGATIVE
BUN SERPL-MCNC: 28 MG/DL (ref 5–25)
CALCIUM SERPL-MCNC: 8.7 MG/DL (ref 8.4–10.2)
CHLORIDE SERPL-SCNC: 106 MMOL/L (ref 96–108)
CLARITY UR: CLEAR
CO2 SERPL-SCNC: 23 MMOL/L (ref 21–32)
COLOR UR: ABNORMAL
CREAT SERPL-MCNC: 1.41 MG/DL (ref 0.6–1.3)
EOSINOPHIL # BLD MANUAL: 0 THOUSAND/UL (ref 0–0.4)
EOSINOPHIL NFR BLD MANUAL: 0 % (ref 0–6)
ERYTHROCYTE [DISTWIDTH] IN BLOOD BY AUTOMATED COUNT: 16.6 % (ref 11.6–15.1)
FLUAV RNA RESP QL NAA+PROBE: NEGATIVE
FLUBV RNA RESP QL NAA+PROBE: NEGATIVE
GFR SERPL CREATININE-BSD FRML MDRD: 36 ML/MIN/1.73SQ M
GLUCOSE SERPL-MCNC: 113 MG/DL (ref 65–140)
GLUCOSE UR STRIP-MCNC: NEGATIVE MG/DL
HCT VFR BLD AUTO: 31.6 % (ref 34.8–46.1)
HGB BLD-MCNC: 9.7 G/DL (ref 11.5–15.4)
HGB UR QL STRIP.AUTO: ABNORMAL
INR PPP: 1.25 (ref 0.85–1.19)
KETONES UR STRIP-MCNC: NEGATIVE MG/DL
L PNEUMO1 AG UR QL IA.RAPID: NEGATIVE
LACTATE SERPL-SCNC: 1.7 MMOL/L (ref 0.5–2)
LACTATE SERPL-SCNC: 2.3 MMOL/L (ref 0.5–2)
LEUKOCYTE ESTERASE UR QL STRIP: NEGATIVE
LYMPHOCYTES # BLD AUTO: 0.33 THOUSAND/UL (ref 0.6–4.47)
LYMPHOCYTES # BLD AUTO: 3 % (ref 14–44)
MACROCYTES BLD QL AUTO: PRESENT
MCH RBC QN AUTO: 29.9 PG (ref 26.8–34.3)
MCHC RBC AUTO-ENTMCNC: 30.7 G/DL (ref 31.4–37.4)
MCV RBC AUTO: 98 FL (ref 82–98)
MICROCYTES BLD QL AUTO: PRESENT
MONOCYTES # BLD AUTO: 0.55 THOUSAND/UL (ref 0–1.22)
MONOCYTES NFR BLD: 5 % (ref 4–12)
MUCOUS THREADS UR QL AUTO: ABNORMAL
NEUTROPHILS # BLD MANUAL: 10.2 THOUSAND/UL (ref 1.85–7.62)
NEUTS BAND NFR BLD MANUAL: 4 % (ref 0–8)
NEUTS SEG NFR BLD AUTO: 88 % (ref 43–75)
NITRITE UR QL STRIP: NEGATIVE
NON-SQ EPI CELLS URNS QL MICRO: ABNORMAL /HPF
NRBC BLD AUTO-RTO: 2 /100 WBC (ref 0–2)
P AXIS: 47 DEGREES
PH UR STRIP.AUTO: 5.5 [PH]
PLATELET # BLD AUTO: 295 THOUSANDS/UL (ref 149–390)
PLATELET BLD QL SMEAR: ADEQUATE
PMV BLD AUTO: 9.7 FL (ref 8.9–12.7)
POLYCHROMASIA BLD QL SMEAR: PRESENT
POTASSIUM SERPL-SCNC: 4.2 MMOL/L (ref 3.5–5.3)
PR INTERVAL: 130 MS
PROCALCITONIN SERPL-MCNC: 45.7 NG/ML
PROT SERPL-MCNC: 6.5 G/DL (ref 6.4–8.4)
PROT UR STRIP-MCNC: ABNORMAL MG/DL
PROTHROMBIN TIME: 16.5 SECONDS (ref 12.3–15)
QRS AXIS: 31 DEGREES
QRSD INTERVAL: 70 MS
QT INTERVAL: 300 MS
QTC INTERVAL: 404 MS
RBC # BLD AUTO: 3.24 MILLION/UL (ref 3.81–5.12)
RBC #/AREA URNS AUTO: ABNORMAL /HPF
RBC MORPH BLD: PRESENT
RSV RNA RESP QL NAA+PROBE: NEGATIVE
S PNEUM AG UR QL: NEGATIVE
SARS-COV-2 RNA RESP QL NAA+PROBE: NEGATIVE
SODIUM SERPL-SCNC: 139 MMOL/L (ref 135–147)
SP GR UR STRIP.AUTO: 1.02 (ref 1–1.03)
T WAVE AXIS: 5 DEGREES
UROBILINOGEN UR STRIP-ACNC: <2 MG/DL
VENTRICULAR RATE: 109 BPM
WBC # BLD AUTO: 11.09 THOUSAND/UL (ref 4.31–10.16)
WBC #/AREA URNS AUTO: ABNORMAL /HPF

## 2025-02-14 PROCEDURE — 0241U HB NFCT DS VIR RESP RNA 4 TRGT: CPT | Performed by: EMERGENCY MEDICINE

## 2025-02-14 PROCEDURE — 93005 ELECTROCARDIOGRAM TRACING: CPT

## 2025-02-14 PROCEDURE — 85730 THROMBOPLASTIN TIME PARTIAL: CPT

## 2025-02-14 PROCEDURE — 83605 ASSAY OF LACTIC ACID: CPT

## 2025-02-14 PROCEDURE — 84145 PROCALCITONIN (PCT): CPT

## 2025-02-14 PROCEDURE — 80053 COMPREHEN METABOLIC PANEL: CPT

## 2025-02-14 PROCEDURE — 96374 THER/PROPH/DIAG INJ IV PUSH: CPT

## 2025-02-14 PROCEDURE — 87449 NOS EACH ORGANISM AG IA: CPT

## 2025-02-14 PROCEDURE — 85610 PROTHROMBIN TIME: CPT

## 2025-02-14 PROCEDURE — 81001 URINALYSIS AUTO W/SCOPE: CPT

## 2025-02-14 PROCEDURE — 36415 COLL VENOUS BLD VENIPUNCTURE: CPT

## 2025-02-14 PROCEDURE — 99285 EMERGENCY DEPT VISIT HI MDM: CPT

## 2025-02-14 PROCEDURE — 87040 BLOOD CULTURE FOR BACTERIA: CPT

## 2025-02-14 PROCEDURE — 96360 HYDRATION IV INFUSION INIT: CPT

## 2025-02-14 PROCEDURE — 93010 ELECTROCARDIOGRAM REPORT: CPT | Performed by: INTERNAL MEDICINE

## 2025-02-14 PROCEDURE — 99291 CRITICAL CARE FIRST HOUR: CPT | Performed by: EMERGENCY MEDICINE

## 2025-02-14 PROCEDURE — 99223 1ST HOSP IP/OBS HIGH 75: CPT | Performed by: INTERNAL MEDICINE

## 2025-02-14 PROCEDURE — 85007 BL SMEAR W/DIFF WBC COUNT: CPT

## 2025-02-14 PROCEDURE — 71045 X-RAY EXAM CHEST 1 VIEW: CPT

## 2025-02-14 PROCEDURE — 85027 COMPLETE CBC AUTOMATED: CPT

## 2025-02-14 RX ORDER — AMLODIPINE BESYLATE 5 MG/1
5 TABLET ORAL DAILY
Status: DISCONTINUED | OUTPATIENT
Start: 2025-02-14 | End: 2025-02-16

## 2025-02-14 RX ORDER — LEVETIRACETAM 250 MG/1
1000 TABLET ORAL ONCE
Status: COMPLETED | OUTPATIENT
Start: 2025-02-14 | End: 2025-02-14

## 2025-02-14 RX ORDER — ACETAMINOPHEN 325 MG/1
650 TABLET ORAL EVERY 6 HOURS PRN
Status: DISCONTINUED | OUTPATIENT
Start: 2025-02-14 | End: 2025-02-16

## 2025-02-14 RX ORDER — LEVETIRACETAM 500 MG/1
1000 TABLET ORAL 2 TIMES DAILY
Status: DISCONTINUED | OUTPATIENT
Start: 2025-02-14 | End: 2025-02-19 | Stop reason: HOSPADM

## 2025-02-14 RX ORDER — SENNOSIDES 8.6 MG
1 TABLET ORAL DAILY
Status: DISCONTINUED | OUTPATIENT
Start: 2025-02-14 | End: 2025-02-19 | Stop reason: HOSPADM

## 2025-02-14 RX ORDER — SULFAMETHOXAZOLE AND TRIMETHOPRIM 800; 160 MG/1; MG/1
1 TABLET ORAL 3 TIMES WEEKLY
Status: DISCONTINUED | OUTPATIENT
Start: 2025-02-14 | End: 2025-02-19 | Stop reason: HOSPADM

## 2025-02-14 RX ORDER — ECHINACEA PURPUREA EXTRACT 125 MG
1 TABLET ORAL
Status: DISCONTINUED | OUTPATIENT
Start: 2025-02-14 | End: 2025-02-19 | Stop reason: HOSPADM

## 2025-02-14 RX ORDER — SODIUM CHLORIDE, SODIUM GLUCONATE, SODIUM ACETATE, POTASSIUM CHLORIDE, MAGNESIUM CHLORIDE, SODIUM PHOSPHATE, DIBASIC, AND POTASSIUM PHOSPHATE .53; .5; .37; .037; .03; .012; .00082 G/100ML; G/100ML; G/100ML; G/100ML; G/100ML; G/100ML; G/100ML
75 INJECTION, SOLUTION INTRAVENOUS CONTINUOUS
Status: DISCONTINUED | OUTPATIENT
Start: 2025-02-14 | End: 2025-02-14

## 2025-02-14 RX ORDER — DEXAMETHASONE 2 MG/1
2 TABLET ORAL 2 TIMES DAILY WITH MEALS
Status: DISCONTINUED | OUTPATIENT
Start: 2025-02-18 | End: 2025-02-19 | Stop reason: HOSPADM

## 2025-02-14 RX ORDER — AMLODIPINE BESYLATE 5 MG/1
5 TABLET ORAL DAILY
Status: CANCELLED | OUTPATIENT
Start: 2025-02-14

## 2025-02-14 RX ORDER — GABAPENTIN 300 MG/1
300 CAPSULE ORAL
Status: DISCONTINUED | OUTPATIENT
Start: 2025-02-14 | End: 2025-02-19 | Stop reason: HOSPADM

## 2025-02-14 RX ORDER — LEVOTHYROXINE SODIUM 50 UG/1
50 TABLET ORAL
Status: DISCONTINUED | OUTPATIENT
Start: 2025-02-14 | End: 2025-02-19 | Stop reason: HOSPADM

## 2025-02-14 RX ORDER — PANTOPRAZOLE SODIUM 40 MG/1
40 TABLET, DELAYED RELEASE ORAL
Status: DISCONTINUED | OUTPATIENT
Start: 2025-02-14 | End: 2025-02-19 | Stop reason: HOSPADM

## 2025-02-14 RX ORDER — FOLIC ACID 1 MG/1
1 TABLET ORAL DAILY
Status: DISCONTINUED | OUTPATIENT
Start: 2025-02-14 | End: 2025-02-19 | Stop reason: HOSPADM

## 2025-02-14 RX ORDER — GABAPENTIN 100 MG/1
100 CAPSULE ORAL 2 TIMES DAILY
Status: DISCONTINUED | OUTPATIENT
Start: 2025-02-14 | End: 2025-02-19 | Stop reason: HOSPADM

## 2025-02-14 RX ORDER — SODIUM CHLORIDE 9 MG/ML
75 INJECTION, SOLUTION INTRAVENOUS CONTINUOUS
Status: DISCONTINUED | OUTPATIENT
Start: 2025-02-14 | End: 2025-02-15

## 2025-02-14 RX ORDER — ACETAMINOPHEN 325 MG/1
975 TABLET ORAL ONCE
Status: COMPLETED | OUTPATIENT
Start: 2025-02-14 | End: 2025-02-14

## 2025-02-14 RX ORDER — DEXAMETHASONE 2 MG/1
3 TABLET ORAL 2 TIMES DAILY WITH MEALS
Status: COMPLETED | OUTPATIENT
Start: 2025-02-14 | End: 2025-02-17

## 2025-02-14 RX ADMIN — GABAPENTIN 300 MG: 300 CAPSULE ORAL at 22:09

## 2025-02-14 RX ADMIN — CEFEPIME 2000 MG: 2 INJECTION, POWDER, FOR SOLUTION INTRAVENOUS at 11:16

## 2025-02-14 RX ADMIN — DEXAMETHASONE 3 MG: 0.5 TABLET ORAL at 16:22

## 2025-02-14 RX ADMIN — SODIUM CHLORIDE 500 ML: 0.9 INJECTION, SOLUTION INTRAVENOUS at 11:16

## 2025-02-14 RX ADMIN — ACETAMINOPHEN 975 MG: 325 TABLET, FILM COATED ORAL at 08:58

## 2025-02-14 RX ADMIN — SULFAMETHOXAZOLE AND TRIMETHOPRIM 1 TABLET: 800; 160 TABLET ORAL at 13:52

## 2025-02-14 RX ADMIN — ACETAMINOPHEN 650 MG: 325 TABLET, FILM COATED ORAL at 19:54

## 2025-02-14 RX ADMIN — APIXABAN 2.5 MG: 2.5 TABLET, FILM COATED ORAL at 18:20

## 2025-02-14 RX ADMIN — SENNOSIDES 8.6 MG: 8.6 TABLET ORAL at 13:52

## 2025-02-14 RX ADMIN — SODIUM CHLORIDE 75 ML/HR: 0.9 INJECTION, SOLUTION INTRAVENOUS at 15:15

## 2025-02-14 RX ADMIN — CYANOCOBALAMIN TAB 500 MCG 1000 MCG: 500 TAB at 15:13

## 2025-02-14 RX ADMIN — FOLIC ACID 1 MG: 1 TABLET ORAL at 13:52

## 2025-02-14 RX ADMIN — DEXAMETHASONE 3 MG: 0.5 TABLET ORAL at 11:16

## 2025-02-14 RX ADMIN — SODIUM CHLORIDE 1000 ML: 0.9 INJECTION, SOLUTION INTRAVENOUS at 13:56

## 2025-02-14 RX ADMIN — GABAPENTIN 100 MG: 100 CAPSULE ORAL at 13:52

## 2025-02-14 RX ADMIN — PANTOPRAZOLE SODIUM 40 MG: 40 TABLET, DELAYED RELEASE ORAL at 13:52

## 2025-02-14 RX ADMIN — VANCOMYCIN HYDROCHLORIDE 1250 MG: 5 INJECTION, POWDER, LYOPHILIZED, FOR SOLUTION INTRAVENOUS at 13:09

## 2025-02-14 RX ADMIN — SODIUM CHLORIDE 500 ML: 0.9 INJECTION, SOLUTION INTRAVENOUS at 08:09

## 2025-02-14 RX ADMIN — LEVETIRACETAM 1000 MG: 250 TABLET, FILM COATED ORAL at 11:16

## 2025-02-14 RX ADMIN — LEVETIRACETAM 1000 MG: 500 TABLET, FILM COATED ORAL at 22:09

## 2025-02-14 NOTE — ED PROVIDER NOTES
ED Disposition       ED Disposition   Admit    Condition   Stable    Date/Time   Fri Feb 14, 2025 11:24 AM    Comment   Case was discussed with Dr. Law and the patient's admission status was agreed to be Admission Status: inpatient status to the service of Dr. Law.               Assessment & Plan       Medical Decision Making  Patient is a 74-year-old female with lung cancer with metastasis to brain and bones who presented to the ED for fever this morning as well as urinary incontinence that the patient states is new.  She states that she was in her normal state of health yesterday and this morning woke up feeling warm and had fever of 101F.  She stated that in addition she has had a worsening of her baseline cough which has been productive.  She states that she also feels fatigued.  States that the urinary incontinence typically happens today when she coughs.  She also noticed that her urine is foul-smelling.  Notably, she got chemotherapy yesterday and states that she often gets illnesses shortly after chemotherapy.  She was noted to have a temperature of 100.3 F and  on triage.    Ddx includes but is not limited to sepsis/bacterial infection from UTI or pneumonia, viral syndrome, sequelae of malignancy.  CMP revealed Cr 1.41 reflecting DILLON. Lactic acid 2.3. Procal 45. WBC 11.   CXR without acute changes and UA unremarkable.  Patient's presentation and labs consistent with sepsis but no obvious source has been found.  Thus, broad-spectrum antibiotics administered as well as fluids. Patient was admitted to medicine for further workup and management.    Amount and/or Complexity of Data Reviewed  Labs: ordered. Decision-making details documented in ED Course.  Radiology: ordered and independent interpretation performed.    Risk  OTC drugs.  Prescription drug management.  Decision regarding hospitalization.        ED Course as of 02/14/25 1126   Fri Feb 14, 2025   0907 Procalcitonin(!): 45.70   0907  "LACTIC ACID(!): 2.3       Medications   vancomycin (VANCOCIN) 1,250 mg in sodium chloride 0.9 % 250 mL IVPB (has no administration in time range)   sodium chloride 0.9 % bolus 500 mL (500 mL Intravenous New Bag 2/14/25 1116)   cefepime (MAXIPIME) 2 g/50 mL dextrose IVPB (2,000 mg Intravenous New Bag 2/14/25 1116)   sodium chloride 0.9 % bolus 500 mL (0 mL Intravenous Stopped 2/14/25 0909)   acetaminophen (TYLENOL) tablet 975 mg (975 mg Oral Given 2/14/25 0858)   dexamethasone (DECADRON) tablet 3 mg (3 mg Oral Given 2/14/25 1116)   levETIRAcetam (KEPPRA) tablet 1,000 mg (1,000 mg Oral Given 2/14/25 1116)       ED Risk Strat Scores                                              History of Present Illness       Chief Complaint   Patient presents with    Fever     PT presents with a fever, after receiving a chemo treatment yesterday. PT has hx of brain, l;carlos and hip cancer. PT also reports \"unable to hold in urine and is wearing pads and urine has a foul smell\"        Past Medical History:   Diagnosis Date    Allergic 2022    Allergic to neomiacin and cephalexin    Cancer (HCC)     lung cancer    Cancer (HCC)     mets to brain    Cancer (HCC)     perianal mass    Cataract Nov. 2023    Due to have durgery June 2024    Essential thrombocytosis (HCC)     controlled with medication    History of transfusion     Hyperlipidemia     Hypertension     Mass in chest     Nodular goiter     Osteoporosis     Peripheral neuropathy     Pneumonia Oct 16, 2023    Also  Feb 2024    Psoriasis     SIRS (systemic inflammatory response syndrome) (HCC) 06/22/2024      Past Surgical History:   Procedure Laterality Date    APPENDECTOMY      BREAST CYST EXCISION Right 2009    COLONOSCOPY  10/31/2018    DXA PROCEDURE (HISTORICAL)  04/21/2017    IR BIOPSY OTHER  9/12/2024    IR LUMBAR PUNCTURE  9/12/2024    MAMMO (HISTORICAL)      8-24-18    MAMMO NEEDLE LOCALIZATION RIGHT (ALL INC) Right 07/13/2009    SKIN LESION EXCISION      bridge of nose    "   Family History   Problem Relation Age of Onset    Stroke Mother     Depression Mother             Hypertension Mother     Hearing loss Mother     Tuberculosis Father     Cancer Brother         lung    Tuberculosis Brother     Coronary artery disease Brother     Hypertension Brother     No Known Problems Daughter     No Known Problems Daughter     No Known Problems Maternal Grandmother     No Known Problems Maternal Grandfather     No Known Problems Paternal Grandmother     No Known Problems Paternal Grandfather     No Known Problems Maternal Aunt     No Known Problems Maternal Aunt     No Known Problems Maternal Aunt     No Known Problems Maternal Aunt     No Known Problems Paternal Aunt     Cancer Brother         Throat canver      Social History     Tobacco Use    Smoking status: Former     Current packs/day: 1.00     Average packs/day: 1 pack/day for 59.1 years (59.1 ttl pk-yrs)     Types: Cigarettes     Start date:      Passive exposure: Past    Smokeless tobacco: Never    Tobacco comments:     Quit    Vaping Use    Vaping status: Never Used   Substance Use Topics    Alcohol use: Yes     Alcohol/week: 5.0 standard drinks of alcohol     Types: 5 Glasses of wine per week    Drug use: Never      E-Cigarette/Vaping    E-Cigarette Use Never User       E-Cigarette/Vaping Substances    Nicotine No     THC No     CBD No     Flavoring No     Other No     Unknown No       I have reviewed and agree with the history as documented.     Patient is a 74-year-old female with lung cancer with metastasis to brain and bones who presented to the ED for fever this morning as well as urinary incontinence that the patient states is new.  She states that she was in her normal state of health yesterday and this morning woke up feeling warm and had fever of 101F.  She stated that in addition she has had a worsening of her baseline cough which has been productive.  She states that she also feels fatigued.  States that  the urinary incontinence typically happens today when she coughs.  She also noticed that her urine is foul-smelling.  Notably, she got chemotherapy yesterday and states that she often gets illnesses shortly after chemotherapy.  She was noted to have a temperature of 100.3 F and  on triage.        Review of Systems   Constitutional:  Positive for fatigue and fever.   HENT: Negative.     Respiratory:  Positive for cough. Negative for shortness of breath.    Gastrointestinal:  Negative for abdominal pain, nausea and vomiting.   Genitourinary:  Positive for urgency.        Urinary incontinence+   Skin:  Negative for color change and pallor.   Neurological:  Negative for dizziness and syncope.   Psychiatric/Behavioral: Negative.             Objective       ED Triage Vitals   Temperature Pulse Blood Pressure Respirations SpO2 Patient Position - Orthostatic VS   02/14/25 0746 02/14/25 0746 02/14/25 0746 02/14/25 0746 02/14/25 0746 02/14/25 0746   100.3 °F (37.9 °C) (!) 108 111/72 18 100 % Lying      Temp Source Heart Rate Source BP Location FiO2 (%) Pain Score    02/14/25 0746 02/14/25 0746 02/14/25 0746 -- 02/14/25 0858    Oral Monitor Right arm  Med Not Given for Pain - for MAR use only      Vitals      Date and Time Temp Pulse SpO2 Resp BP Pain Score FACES Pain Rating User   02/14/25 1030 -- 92 91 % -- 106/64 -- -- MM   02/14/25 0930 -- 99 90 % -- 113/73 -- -- MM   02/14/25 0858 -- -- -- -- -- Med Not Given for Pain - for MAR use only -- MM   02/14/25 0830 -- 104 95 % -- 129/81 -- -- KS   02/14/25 0746 100.3 °F (37.9 °C) 108 100 % 18 111/72 -- -- AG            Physical Exam  On examination:  The patient is awake, alert and oriented  HEENT: Normocephalic/atraumatic  External examination of the ears is unremarkable  Pupils are equal round and reactive to light, there is no conjunctival injection or scleral icterus noted  Nares are patent without rhinorrhea.  The oropharynx is moist without injection  The neck is  supple  Lungs: Clear to auscultation bilaterally  Heart: Regular without murmurs rubs or gallops  Abdomen: Soft and nontender. There are positive bowel sounds. there is no rebound or guarding  Musculoskeletal: Normal range of motion with grossly normal strength  Neuro: Cranial nerves II through XII grossly intact. Nonfocal exam  Skin: No rash noted  Psych: Mood and affect normal      Results Reviewed       Procedure Component Value Units Date/Time    Lactic acid 2 Hours [[id number]] Collected: 02/14/25 1122    Lab Status: No result Specimen: Blood from Arm, Right     Urine Microscopic [[id number]]  (Abnormal) Collected: 02/14/25 0925    Lab Status: Final result Specimen: Urine, Clean Catch Updated: 02/14/25 1058     RBC, UA 4-10 /hpf      WBC, UA None Seen /hpf      Epithelial Cells Occasional /hpf      Bacteria, UA None Seen /hpf      MUCUS THREADS Occasional    UA w Reflex to Microscopic w Reflex to Culture [[id number]]  (Abnormal) Collected: 02/14/25 0925    Lab Status: Final result Specimen: Urine, Clean Catch Updated: 02/14/25 1017     Color, UA Light Yellow     Clarity, UA Clear     Specific Gravity, UA 1.016     pH, UA 5.5     Leukocytes, UA Negative     Nitrite, UA Negative     Protein, UA 50 (1+) mg/dl      Glucose, UA Negative mg/dl      Ketones, UA Negative mg/dl      Urobilinogen, UA <2.0 mg/dl      Bilirubin, UA Negative     Occult Blood, UA Moderate    RBC Morphology Reflex Test [[id number]] Collected: 02/14/25 0800    Lab Status: Final result Specimen: Blood from Arm, Right Updated: 02/14/25 1001    FLU/RSV/COVID - if FLU/RSV clinically relevant (2hr TAT) [[id number]]  (Normal) Collected: 02/14/25 0900    Lab Status: Final result Specimen: Nares from Nose Updated: 02/14/25 0959     SARS-CoV-2 Negative     INFLUENZA A PCR Negative     INFLUENZA B PCR Negative     RSV PCR Negative    Narrative:      This test has been performed using the CoV-2/Flu/RSV plus assay on the [employer] Gene[employer]pert platform. This test  has been validated by the  and verified by the performing laboratory.     This test is designed to amplify and detect the following: nucleocapsid (N), envelope (E), and RNA-dependent RNA polymerase (RdRP) genes of the SARS-CoV-2 genome; matrix (M), basic polymerase (PB2), and acidic protein (PA) segments of the influenza A genome; matrix (M) and non-structural protein (NS) segments of the influenza B genome, and the nucleocapsid genes of RSV A and RSV B.     Positive results are indicative of the presence of Flu A, Flu B, RSV, and/or SARS-CoV-2 RNA. Positive results for SARS-CoV-2 or suspected novel influenza should be reported to state, local, or federal health departments according to local reporting requirements.      All results should be assessed in conjunction with clinical presentation and other laboratory markers for clinical management.     FOR PEDIATRIC PATIENTS - copy/paste COVID Guidelines URL to browser: [email or web address]       CBC and differential [[id number]]  (Abnormal) Collected: 02/14/25 0800    Lab Status: Final result Specimen: Blood from Arm, Right Updated: 02/14/25 0939     WBC 11.09 Thousand/uL      RBC 3.24 Million/uL      Hemoglobin 9.7 g/dL      Hematocrit 31.6 %      MCV 98 fL      MCH 29.9 pg      MCHC 30.7 g/dL      RDW 16.6 %      MPV 9.7 fL      Platelets 295 Thousands/uL     Manual Differential(PHLEBS Do Not Order) [[id number]]  (Abnormal) Collected: 02/14/25 0800    Lab Status: Final result Specimen: Blood from Arm, Right Updated: 02/14/25 0939     Segmented % 88 %      Bands % 4 %      Lymphocytes % 3 %      Monocytes % 5 %      Eosinophils % 0 %      Basophils % 0 %      Absolute Neutrophils 10.20 Thousand/uL      Absolute Lymphocytes 0.33 Thousand/uL      Absolute Monocytes 0.55 Thousand/uL      Absolute Eosinophils 0.00 Thousand/uL      Absolute Basophils 0.00 Thousand/uL      Total Counted --     nRBC 2 /100 WBC       RBC Morphology Present     Platelet Estimate Adequate     Anisocytosis Present     Macrocytes Present     Microcytes Present     Polychromasia Present    Procalcitonin [[id number]]  (Abnormal) Collected: 02/14/25 0800    Lab Status: Final result Specimen: Blood from Arm, Right Updated: 02/14/25 0900     Procalcitonin 45.70 ng/ml     Protime-INR [[id number]]  (Abnormal) Collected: 02/14/25 0800    Lab Status: Final result Specimen: Blood from Arm, Right Updated: 02/14/25 0854     Protime 16.5 seconds      INR 1.25    Narrative:      INR Therapeutic Range    Indication                                             INR Range      Atrial Fibrillation                                               2.0-3.0  Hypercoagulable State                                    2.0.2.3  Left Ventricular Asist Device                            2.0-3.0  Mechanical Heart Valve                                  -    Aortic(with afib, MI, embolism, HF, LA enlargement,    and/or coagulopathy)                                     2.0-3.0 (2.5-3.5)     Mitral                                                             2.5-3.5  Prosthetic/Bioprosthetic Heart Valve               2.0-3.0  Venous thromboembolism (VTE: VT, PE        2.0-3.0    APTT [[id number]]  (Normal) Collected: 02/14/25 0800    Lab Status: Final result Specimen: Blood from Arm, Right Updated: 02/14/25 0854     PTT 34 seconds     Comprehensive metabolic panel [[id number]]  (Abnormal) Collected: 02/14/25 0800    Lab Status: Final result Specimen: Blood from Arm, Right Updated: 02/14/25 0852     Sodium 139 mmol/L      Potassium 4.2 mmol/L      Chloride 106 mmol/L      CO2 23 mmol/L      ANION GAP 10 mmol/L      BUN 28 mg/dL      Creatinine 1.41 mg/dL      Glucose 113 mg/dL      Calcium 8.7 mg/dL      AST 19 U/L      ALT 14 U/L      Alkaline Phosphatase 51 U/L      Total Protein 6.5 g/dL      Albumin 3.7 g/dL      Total Bilirubin 0.40 mg/dL      eGFR 36 ml/min/1.73sq m     Narrative:       National Kid[employer] Disease Foundation guidelines for Chronic Kidney Disease (CKD):     Stage 1 with normal or high GFR (GFR > 90 mL/min/1.73 square meters)    Stage 2 Mild CKD (GFR = 60-89 mL/min/1.73 square meters)    Stage 3A Moderate CKD (GFR = 45-59 mL/min/1.73 square meters)    Stage 3B Moderate CKD (GFR = 30-44 mL/min/1.73 square meters)    Stage 4 Severe CKD (GFR = 15-29 mL/min/1.73 square meters)    Stage 5 End Stage CKD (GFR <15 mL/min/1.73 square meters)  Note: GFR calculation is accurate only with a steady state creatinine    Lactic acid [[id number]]  (Abnormal) Collected: 02/14/25 0800    Lab Status: Final result Specimen: Blood from Arm, Right Updated: 02/14/25 0850     LACTIC ACID 2.3 mmol/L     Narrative:      Result may be elevated if tourniquet was used during collection.    Blood culture #2 [[id number]] Collected: 02/14/25 0800    Lab Status: In process Specimen: Blood from Arm, Right Updated: 02/14/25 0817    Blood culture #1 [[id number]] Collected: 02/14/25 0800    Lab Status: In process Specimen: Blood from Arm, Right Updated: 02/14/25 0817            XR chest 1 view portable   ED Interpretation by Greg Rouse MD (02/14 0839)   No acute cardiopulmonary process.  Previous malignant changes revisualized.  This is my independent interpretation.      Final Interpretation by Cuong Velazquez MD (02/14 1003)      No radiographic evidence of acute intrathoracic process or significant interval change allowing for difference in positioning.            Workstation performed: [id number]             ECG 12 Lead Documentation Only    Date/Time: 2/14/2025 7:59 AM    Performed by: Greg Rouse MD  Authorized by: Greg Rouse MD    Indications / Diagnosis:  Sepsis workup/tachycardia  ECG reviewed by me, the ED Provider: yes    Patient location:  ED  Previous ECG:     Comparison to cardiac monitor: Yes    Interpretation:     Interpretation: abnormal    Rate:     ECG rate:  109    ECG rate  assessment: tachycardic    Rhythm:     Rhythm: sinus tachycardia    Ectopy:     Ectopy: none    QRS:     QRS axis:  Normal    QRS intervals:  Normal  Conduction:     Conduction: normal    ST segments:     ST segments:  Non-specific  T waves:     T waves: non-specific        ED Medication and Procedure Management   Prior to Admission Medications   Prescriptions Last Dose Informant Patient Reported? Taking?   amLODIPine (NORVASC) 5 mg tablet   No No   Sig: TAKE 1 TABLET(5 MG) BY MOUTH DAILY   dexamethasone (DECADRON) 1 mg tablet   No No   Sig: Take 4 tablets (4 mg total) by mouth 2 (two) times a day with meals for 7 days, THEN 3 tablets (3 mg total) 2 (two) times a day with meals for 7 days, THEN 2 tablets (2 mg total) 2 (two) times a day with meals for 7 days.   folic acid (FOLVITE) 1 mg tablet   No No   Sig: Take 1 tablet (1 mg total) by mouth daily   gabapentin (NEURONTIN) 100 mg capsule   No No   Sig: TAKE 1 CAPSULE BY MOUTH EVERY MORNING, 1 CAPSULE EVERY AFTERNOON AND 3 CAPSULES EVERY NIGHT AT BEDTIME   levETIRAcetam (KEPPRA) 1000 MG tablet   No No   Sig: TAKE 1 TABLET(1000 MG) BY MOUTH EVERY 12 HOURS   levothyroxine 50 mcg tablet   No No   Sig: TAKE 1 TABLET(50 MCG) BY MOUTH DAILY EARLY MORNING   pantoprazole (PROTONIX) 40 mg tablet   No No   Sig: Take 1 tablet (40 mg total) by mouth daily in the early morning   rivaroxaban (Xarelto) 20 mg tablet   No No   Sig: Take 1 tablet (20 mg total) by mouth daily with breakfast   senna (SENOKOT) 8.6 MG tablet  Self Yes No   Sig: Take 1 tablet by mouth daily Takes one every other day   sodium chloride (OCEAN) 0.65 % nasal spray   No No   Si spray into each nostril as needed for congestion   sulfamethoxazole-trimethoprim (BACTRIM DS) 800-160 mg per tablet   No No   Sig: Take 1 tablet by mouth 3 (three) times a week Starting , you can take one tablet Monday, Wednesday, and .   vitamin B-12 (VITAMIN B-12) 1,000 mcg tablet  Self No No   Sig: Take 1 tablet  (1,000 mcg total) by mouth daily      Facility-Administered Medications: None     Patient's Medications   Discharge Prescriptions    No medications on file     No discharge procedures on file.  ED SEPSIS DOCUMENTATION            Greg Rouse MD  02/14/25 3620

## 2025-02-14 NOTE — PROGRESS NOTES
Ayla Nye is a 74 y.o. female who is currently ordered Vancomycin IV with management by the Pharmacy Consult service.  Relevant clinical data and objective / subjective history reviewed.  Vancomycin Assessment:  Indication and Goal AUC/Trough: Pneumonia (goal -600, trough >10)  Clinical Status:  new  Micro:     Renal Function:  SCr: 1.41 mg/dL  CrCl: 27.6 mL/min  Renal replacement: Not on dialysis  Days of Therapy: 1  Current Dose: 1250mg loading dose  Vancomycin Plan:  New Dosin.5mg/kg (750mg) qd prn level <15  Next Level: 02/15 @ 0600  Renal Function Monitoring: Daily BMP and UOP  Pharmacy will continue to follow closely for s/sx of nephrotoxicity, infusion reactions and appropriateness of therapy.  BMP and CBC will be ordered per protocol. We will continue to follow the patient’s culture results and clinical progress daily.    Gracy Padilla, Pharmacist

## 2025-02-14 NOTE — PLAN OF CARE
Problem: PAIN - ADULT  Goal: Verbalizes/displays adequate comfort level or baseline comfort level  Description: Interventions:  - Encourage patient to monitor pain and request assistance  - Assess pain using appropriate pain scale  - Administer analgesics based on type and severity of pain and evaluate response  - Implement non-pharmacological measures as appropriate and evaluate response  - Consider cultural and social influences on pain and pain management  - Notify physician/advanced practitioner if interventions unsuccessful or patient reports new pain  Outcome: Progressing     Problem: INFECTION - ADULT  Goal: Absence or prevention of progression during hospitalization  Description: INTERVENTIONS:  - Assess and monitor for signs and symptoms of infection  - Monitor lab/diagnostic results  - Monitor all insertion sites, i.e. indwelling lines, tubes, and drains  - Monitor endotracheal if appropriate and nasal secretions for changes in amount and color  - [place] appropriate cooling/warming therapies per order  - Administer medications as ordered  - Instruct and encourage patient and family to use good hand hygiene technique  - Identify and instruct in appropriate isolation precautions for identified infection/condition  Outcome: Progressing     Problem: SAFETY ADULT  Goal: Patient will remain free of falls  Description: INTERVENTIONS:  - Educate patient/family on patient safety including physical limitations  - Instruct patient to call for assistance with activity   - Consult OT/PT to assist with strengthening/mobility   - Keep Call bell within reach  - Keep bed low and locked with side rails adjusted as appropriate  - Keep care items and personal belongings within reach  - Initiate and maintain comfort rounds  - Make Fall Risk Sign visible to staff  - Offer Toileting every 2 Hours, in advance of need  - Initiate/Maintain bed alarm  - Apply yellow socks and bracelet for high fall risk patients  - Consider  moving patient to room near nurses station  Outcome: Progressing  Goal: Maintain or return to baseline ADL function  Description: INTERVENTIONS:  -  Assess patient's ability to carry out ADLs; assess patient's baseline for ADL function and identify physical deficits which impact ability to perform ADLs (bathing, care of mouth/teeth, toileting, grooming, dressing, etc.)  - Assess/evaluate cause of self-care deficits   - Assess range of motion  - Assess patient's mobility; develop plan if impaired  - Assess patient's need for assistive devices and provide as appropriate  - Encourage maximum independence but intervene and supervise when necessary  - Involve family in performance of ADLs  - Assess for home care needs following discharge   - Consider OT consult to assist with ADL evaluation and planning for discharge  - Provide patient education as appropriate  Outcome: Progressing  Goal: Maintains/Returns to pre admission functional level  Description: INTERVENTIONS:  - Perform AM-PAC 6 Click Basic Mobility/ Daily Activity assessment daily.  - Set and communicate daily mobility goal to care team and patient/family/caregiver.   - Collaborate with rehabilitation services on mobility goals if consulted  - Perform Range of Motion 3 times a day.  - Reposition patient every 2 hours.  - Dangle patient 3 times a day  - Stand patient 3 times a day  - Ambulate patient 3 times a day  - Out of bed to chair 3 times a day   - Out of bed for meals 3 times a day  - Out of bed for toileting  - Record patient progress and toleration of activity level   Outcome: Progressing     Problem: DISCHARGE PLANNING  Goal: Discharge to home or other facility with appropriate resources  Description: INTERVENTIONS:  - Identify barriers to discharge w/patient and caregiver  - Arrange for needed discharge resources and transportation as appropriate  - Identify discharge learning needs (meds, wound care, etc.)  - Arrange for interpretive services to  assist at discharge as needed  - Refer to Case Management Department for coordinating discharge planning if the patient needs post-hospital services based on physician/advanced practitioner order or complex needs related to functional status, cognitive ability, or social support system  Outcome: Progressing     Problem: Knowledge Deficit  Goal: Patient/family/caregiver demonstrates understanding of disease process, treatment plan, medications, and discharge instructions  Description: Complete learning assessment and assess knowledge base.  Interventions:  - Provide teaching at level of understanding  - Provide teaching via preferred learning methods  Outcome: Progressing

## 2025-02-14 NOTE — ED ATTENDING ATTESTATION
2/14/2025  I, Greg Faith MD, saw and evaluated the patient. I have discussed the patient with the resident/non-physician practitioner and agree with the resident's/non-physician practitioner's findings, Plan of Care, and MDM as documented in the resident's/non-physician practitioner's note, except where noted. All available labs and Radiology studies were reviewed.  I was present for key portions of any procedure(s) performed by the resident/non-physician practitioner and I was immediately available to provide assistance.       At this point I agree with the current assessment done in the Emergency Department.  I have conducted an independent evaluation of this patient a history and physical is as follows: Generalized weakness, cough, nausea, urinary frequency.  Recently underwent chemotherapy.  Home health caregiver accompany patient to the ED, temperature 102.7 at home this morning, generalized weakness, malaise, fatigue.    Temperature 100.3 in ED, tachycardia.  Procalcitonin markedly elevated, elevated lactic acid, elevated creatinine over baseline.  Will need IV fluids, broad antibiotics and admission.    Critical Care Time Statement: Upon my evaluation, this patient had a high probability of imminent or life-threatening deterioration due to sepsis, elevated lactic acid, broad abx, IVF, which required my direct attention, intervention, and personal management.  I spent a total of 45 minutes directly providing critical care services, including interpretation of complex medical databases, evaluating for the presence of life-threatening injuries or illnesses, and management of organ system failure(s) . This time is exclusive of procedures, teaching, treating other patients, family meetings, and any prior time recorded by providers other than myself.        Results Reviewed       Procedure Component Value Units Date/Time    Lactic acid 2 Hours [[id number]] Collected: 02/14/25 1122    Lab Status: No result  Specimen: Blood from Arm, Right     Urine Microscopic [[id number]]  (Abnormal) Collected: 02/14/25 0925    Lab Status: Final result Specimen: Urine, Clean Catch Updated: 02/14/25 1058     RBC, UA 4-10 /hpf      WBC, UA None Seen /hpf      Epithelial Cells Occasional /hpf      Bacteria, UA None Seen /hpf      MUCUS THREADS Occasional    UA w Reflex to Microscopic w Reflex to Culture [[id number]]  (Abnormal) Collected: 02/14/25 0925    Lab Status: Final result Specimen: Urine, Clean Catch Updated: 02/14/25 1017     Color, UA Light Yellow     Clarity, UA Clear     Specific Gravity, UA 1.016     pH, UA 5.5     Leukocytes, UA Negative     Nitrite, UA Negative     Protein, UA 50 (1+) mg/dl      Glucose, UA Negative mg/dl      Ketones, UA Negative mg/dl      Urobilinogen, UA <2.0 mg/dl      Bilirubin, UA Negative     Occult Blood, UA Moderate    RBC Morphology Reflex Test [[id number]] Collected: 02/14/25 0800    Lab Status: Final result Specimen: Blood from Arm, Right Updated: 02/14/25 1001    FLU/RSV/COVID - if FLU/RSV clinically relevant (2hr TAT) [[id number]]  (Normal) Collected: 02/14/25 0900    Lab Status: Final result Specimen: Nares from Nose Updated: 02/14/25 0959     SARS-CoV-2 Negative     INFLUENZA A PCR Negative     INFLUENZA B PCR Negative     RSV PCR Negative    Narrative:      This test has been performed using the CoV-2/Flu/RSV plus assay on the [employer] GeneXpert platform. This test has been validated by the  and verified by the performing laboratory.     This test is designed to amplify and detect the following: nucleocapsid (N), envelope (E), and RNA-dependent RNA polymerase (RdRP) genes of the SARS-CoV-2 genome; matrix (M), basic polymerase (PB2), and acidic protein (PA) segments of the influenza A genome; matrix (M) and non-structural protein (NS) segments of the influenza B genome, and the nucleocapsid genes of RSV A and RSV B.     Positive results are indicative of the presence of Flu A, Flu  B, RSV, and/or SARS-CoV-2 RNA. Positive results for SARS-CoV-2 or suspected novel influenza should be reported to state, local, or federal health departments according to local reporting requirements.      All results should be assessed in conjunction with clinical presentation and other laboratory markers for clinical management.     FOR PEDIATRIC PATIENTS - copy/paste COVID Guidelines URL to browser: [email or web address]       CBC and differential [[id number]]  (Abnormal) Collected: 02/14/25 0800    Lab Status: Final result Specimen: Blood from Arm, Right Updated: 02/14/25 0939     WBC 11.09 Thousand/uL      RBC 3.24 Million/uL      Hemoglobin 9.7 g/dL      Hematocrit 31.6 %      MCV 98 fL      MCH 29.9 pg      MCHC 30.7 g/dL      RDW 16.6 %      MPV 9.7 fL      Platelets 295 Thousands/uL     Manual Differential(PHLEBS Do Not Order) [[id number]]  (Abnormal) Collected: 02/14/25 0800    Lab Status: Final result Specimen: Blood from Arm, Right Updated: 02/14/25 0939     Segmented % 88 %      Bands % 4 %      Lymphocytes % 3 %      Monocytes % 5 %      Eosinophils % 0 %      Basophils % 0 %      Absolute Neutrophils 10.20 Thousand/uL      Absolute Lymphocytes 0.33 Thousand/uL      Absolute Monocytes 0.55 Thousand/uL      Absolute Eosinophils 0.00 Thousand/uL      Absolute Basophils 0.00 Thousand/uL      Total Counted --     nRBC 2 /100 WBC      RBC Morphology Present     Platelet Estimate Adequate     Anisocytosis Present     Macrocytes Present     Microcytes Present     Polychromasia Present    Procalcitonin [[id number]]  (Abnormal) Collected: 02/14/25 0800    Lab Status: Final result Specimen: Blood from Arm, Right Updated: 02/14/25 0900     Procalcitonin 45.70 ng/ml     Protime-INR [[id number]]  (Abnormal) Collected: 02/14/25 0800    Lab Status: Final result Specimen: Blood from Arm, Right Updated: 02/14/25 0854     Protime 16.5 seconds      INR 1.25    Narrative:       INR Therapeutic Range    Indication                                             INR Range      Atrial Fibrillation                                               2.0-3.0  Hypercoagulable State                                    2.0.2.3  Left Ventricular Asist Device                            2.0-3.0  Mechanical Heart Valve                                  -    Aortic(with afib, MI, embolism, HF, LA enlargement,    and/or coagulopathy)                                     2.0-3.0 (2.5-3.5)     Mitral                                                             2.5-3.5  Prosthetic/Bioprosthetic Heart Valve               2.0-3.0  Venous thromboembolism (VTE: VT, PE        2.0-3.0    APTT [[id number]]  (Normal) Collected: 02/14/25 0800    Lab Status: Final result Specimen: Blood from Arm, Right Updated: 02/14/25 0854     PTT 34 seconds     Comprehensive metabolic panel [[id number]]  (Abnormal) Collected: 02/14/25 0800    Lab Status: Final result Specimen: Blood from Arm, Right Updated: 02/14/25 0852     Sodium 139 mmol/L      Potassium 4.2 mmol/L      Chloride 106 mmol/L      CO2 23 mmol/L      ANION GAP 10 mmol/L      BUN 28 mg/dL      Creatinine 1.41 mg/dL      Glucose 113 mg/dL      Calcium 8.7 mg/dL      AST 19 U/L      ALT 14 U/L      Alkaline Phosphatase 51 U/L      Total Protein 6.5 g/dL      Albumin 3.7 g/dL      Total Bilirubin 0.40 mg/dL      eGFR 36 ml/min/1.73sq m     Narrative:      National Kid[employer] guidelines for Chronic Kidney Disease (CKD):     Stage 1 with normal or high GFR (GFR > 90 mL/min/1.73 square meters)    Stage 2 Mild CKD (GFR = 60-89 mL/min/1.73 square meters)    Stage 3A Moderate CKD (GFR = 45-59 mL/min/1.73 square meters)    Stage 3B Moderate CKD (GFR = 30-44 mL/min/1.73 square meters)    Stage 4 Severe CKD (GFR = 15-29 mL/min/1.73 square meters)    Stage 5 End Stage CKD (GFR <15 mL/min/1.73 square meters)  Note: GFR calculation is accurate only with a steady state  creatinine    Lactic acid [[id number]]  (Abnormal) Collected: 02/14/25 0800    Lab Status: Final result Specimen: Blood from Arm, Right Updated: 02/14/25 0850     LACTIC ACID 2.3 mmol/L     Narrative:      Result may be elevated if tourniquet was used during collection.    Blood culture #2 [[id number]] Collected: 02/14/25 0800    Lab Status: In process Specimen: Blood from Arm, Right Updated: 02/14/25 0817    Blood culture #1 [[id number]] Collected: 02/14/25 0800    Lab Status: In process Specimen: Blood from Arm, Right Updated: 02/14/25 0817          XR chest 1 view portable   ED Interpretation by Greg Rouse MD (02/14 0839)   No acute cardiopulmonary process.  Previous malignant changes revisualized.  This is my independent interpretation.      Final Result by Cuong Velazquez MD (02/14 1003)      No radiographic evidence of acute intrathoracic process or significant interval change allowing for difference in positioning.            Workstation performed: [id number]               ED Course         Critical Care Time  Procedures

## 2025-02-14 NOTE — ASSESSMENT & PLAN NOTE
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  She had just received these medications yesterday and receives chemo every 3 weeks  Most recent CT head without contrast 2/3/2025: Evidence of parietal and occipital vasogenic edema without significant change from prior 1227/2024  Currently on steroid taper with Decadron which started on 2/4, and is anticipated to end on Tuesday/25  Also takes Keppra 1000 mg twice daily    Plan:  Continue steroid taper with Decadron  Continue Keppra 1000 mg twice daily

## 2025-02-14 NOTE — H&P
H&P - Hospitalist   Name: Ayla Nye 74 y.o. female I MRN: [id number]  Unit/Bed#: ED-42 I Date of Admission: 2/14/2025   Date of Service: 2/14/2025 I Hospital Day: 0     Assessment & Plan  Fever  Presenting with fever that began last night measured at 101 F at home  She has just received her chemotherapy including carboplatin and pemetrexed as well as biologic therapy with Avastin yesterday  She has been regularly receiving these treatments every 3 weeks, she states fevers and fatigue are common reaction following her chemotherapy treatments  She also indicated presence of a dry cough  Presented to the ED tachycardic at 108, low grade fever of 100.3F, slight elevation of WBC at 11, Pro-Du elevated 45, LA elevated at 2.3  UA: No evidence of UTI  CXR: No evidence of acute process  No definitive source of infection at this time, fever could be due to developing infection versus malignancy sequela following chemotherapy administration    Plan:  Continue broad-spectrum antibiotic therapy with IV vancomycin every 12 hours, and cefepime 1 g every 24 hours, pharmacy consult for assistance with vancomycin dosing  Given 2 L bolus fluids in the ED, will continue with maintenance IV  fluids  Lactic acid trending down at 1.7 following initial fluid administration, will check an AM lactic acid  Check AM procal   As needed Tylenol for fevers  Monitor fever curve  Check daily CBC    Metastatic adenocarcinoma (HCC)  Metastatic adenocarcinoma of the lungs with mets to the brain diagnosed in April 2024  Received radiation therapy from July to September  Currently on chemotherapy and biologic therapy with carboplatin, pemetrexed, and Avastin.  She had just received these medications yesterday and receives chemo every 3 weeks  Most recent CT head without contrast 2/3/2025: Evidence of parietal and occipital vasogenic edema without significant change from prior 1227/2024  Currently on steroid taper with Decadron which started on  2/4, and is anticipated to end on Tuesday/25  Also takes Keppra 1000 mg twice daily    Plan:  Continue steroid taper with Decadron  Continue Keppra 1000 mg twice daily    DILLON (acute kidney injury) (HCC)  KDIGO guidelines define DILLON as an increase in Creatinine of 0.3 mg/dL over a 48 hour period, or an increase in Creatinine (over 7 days) of greater than 1.5 times the baseline.     Recent Labs     02/14/25  0800   CREATININE 1.41*   EGFR 36     Estimated Creatinine Clearance: 27.7 mL/min (A) (by C-G formula based on SCr of 1.41 mg/dL (H)).    (baseline 0.8-1.0)  Could be due to recent administration of pemetrexed yesterday for chemotherapy  Will start maintenance fluids at this time with isolate  Monitor daily BMP, avoid nephrotoxic agents    History of Pneumocystis jirovecii pneumonia  Currently takes Bactrim 3 times weekly every MWF  Continue Bactrim regimen while inpatient  Hypertension  Blood pressure is currently stable however not at goal  Will hold home amlodipine 5 mg in the setting of infection  GERD (gastroesophageal reflux disease)  Continue home Protonix 40 mg daily  Hypothyroidism  Continue home levothyroxine daily  History of pulmonary embolus (PE)  History of pulmonary embolism which occurred in November 2024  Takes Xarelto 20 mg daily at home    Plan:  Given DILLON on admission will hold Xarelto for now and replace with Eliquis 2.5 mg twice daily  Consider replacing Xarelto with Eliquis on discharge  Anemia  Anemia is chronic most likely secondary to chemotherapy  Hemoglobin on admission 9.7 stable    Plan:  Continue to trend daily CBC  Monitor for signs of active bleeding      VTE Pharmacologic Prophylaxis: VTE Score: 9 High Risk (Score >/= 5) - Pharmacological DVT Prophylaxis Contraindicated. Sequential Compression Devices Ordered.  Code Status: Level 1 - Full Code with patient   Discussion with family: Updated  (daughter) at bedside.    Anticipated Length of Stay: Patient will be  "admitted on an inpatient basis with an anticipated length of stay of greater than 2 midnights secondary to fever in setting of immunosuppression secondary to chemotherapy.    History of Present Illness   Chief Complaint: Lily Nye is a 74 y.o. female with a PMH of metastatic adenocarcinoma with mets to brain currently on chemotherapy, prior pulmonary embolism in November 2024, HTN, anemia, and GERD who presents with fever, fatigue, and mild cough which began last night.  She has been receiving chemotherapy every 3 weeks for her metastatic lung carcinoma, she had just received chemo yesterday which included carboplatin, pemetrexed, and biologic therapy with Avastin.  Following her chemo she began to feel feverish late last night which was measured at 101F, and developed a mild nonproductive cough.  She states that the symptoms are sometimes typical following her chemotherapy treatments.  She also indicated a new urinary incontinence which began yesterday however she attributes this to receiving lots of fluids from her chemotherapy yesterday as well as during admission today.  She did not indicate any dysuria, straining with urination, or numbness or tingling.  In the ED she states she feels \" warm\" otherwise she has no chest pain, shortness of breath, lightheadedness, or dizziness.    Review of Systems   Constitutional:  Negative for chills and fever.   HENT:  Negative for congestion and sinus pressure.    Respiratory:  Negative for chest tightness and shortness of breath.    Cardiovascular:  Negative for chest pain and palpitations.   Gastrointestinal:  Negative for abdominal pain, diarrhea, nausea and vomiting.   Genitourinary:  Negative for difficulty urinating and dysuria.   Musculoskeletal:  Negative for arthralgias and myalgias.   Neurological:  Negative for dizziness, light-headedness and numbness.       Historical Information   Past Medical History:   Diagnosis Date    Allergic 2022    " Allergic to neomiacin and cephalexin    Cancer (HCC)     lung cancer    Cancer (HCC)     mets to brain    Cancer (HCC)     perianal mass    Cataract Nov. 2023    Due to have durgery June 2024    Essential thrombocytosis (HCC)     controlled with medication    History of transfusion     Hyperlipidemia     Hypertension     Mass in chest     Nodular goiter     Osteoporosis     Peripheral neuropathy     Pneumonia Oct 16, 2023    Also  Feb 2024    Psoriasis     SIRS (systemic inflammatory response syndrome) (HCC) 06/22/2024     Past Surgical History:   Procedure Laterality Date    APPENDECTOMY      BREAST CYST EXCISION Right 2009    COLONOSCOPY  10/31/2018    DXA PROCEDURE (HISTORICAL)  04/21/2017    IR BIOPSY OTHER  9/12/2024    IR LUMBAR PUNCTURE  9/12/2024    MAMMO (HISTORICAL)      8-24-18    MAMMO NEEDLE LOCALIZATION RIGHT (ALL INC) Right 07/13/2009    SKIN LESION EXCISION      bridge of nose     Social History     Tobacco Use    Smoking status: Former     Current packs/day: 1.00     Average packs/day: 1 pack/day for 59.1 years (59.1 ttl pk-yrs)     Types: Cigarettes     Start date: 1966     Passive exposure: Past    Smokeless tobacco: Never    Tobacco comments:     Quit 2010   Vaping Use    Vaping status: Never Used   Substance and Sexual Activity    Alcohol use: Yes     Alcohol/week: 5.0 standard drinks of alcohol     Types: 5 Glasses of wine per week    Drug use: Never    Sexual activity: Not Currently     Partners: Male     Birth control/protection: Post-menopausal     E-Cigarette/Vaping    E-Cigarette Use Never User      E-Cigarette/Vaping Substances    Nicotine No     THC No     CBD No     Flavoring No     Other No     Unknown No        Social History:  Marital Status:    Occupation: N/A  Patient Pre-hospital Living Situation: Home  Patient Pre-hospital Level of Mobility: walks  Patient Pre-hospital Diet Restrictions: None     Meds/Allergies   I have reviewed home medications with patient  personally.  Prior to Admission medications    Medication Sig Start Date End Date Taking? Authorizing Provider   amLODIPine (NORVASC) 5 mg tablet TAKE 1 TABLET(5 MG) BY MOUTH DAILY 1/20/25   Rafy Angelo MD   dexamethasone (DECADRON) 1 mg tablet Take 4 tablets (4 mg total) by mouth 2 (two) times a day with meals for 7 days, THEN 3 tablets (3 mg total) 2 (two) times a day with meals for 7 days, THEN 2 tablets (2 mg total) 2 (two) times a day with meals for 7 days. 2/4/25 2/25/25  Kaylan Rios MD   folic acid (FOLVITE) 1 mg tablet Take 1 tablet (1 mg total) by mouth daily 11/22/24   Rafy Angelo MD   gabapentin (NEURONTIN) 100 mg capsule TAKE 1 CAPSULE BY MOUTH EVERY MORNING, 1 CAPSULE EVERY AFTERNOON AND 3 CAPSULES EVERY NIGHT AT BEDTIME 1/13/25   Lisy ZULEYAM Gage   levETIRAcetam (KEPPRA) 1000 MG tablet TAKE 1 TABLET(1000 MG) BY MOUTH EVERY 12 HOURS 1/14/25   Rafy Angelo MD   levothyroxine 50 mcg tablet TAKE 1 TABLET(50 MCG) BY MOUTH DAILY EARLY MORNING 1/14/25   Rafy Angelo MD   pantoprazole (PROTONIX) 40 mg tablet Take 1 tablet (40 mg total) by mouth daily in the early morning 2/4/25 3/6/25  Kaylan Rios MD   rivaroxaban (Xarelto) 20 mg tablet Take 1 tablet (20 mg total) by mouth daily with breakfast 1/13/25   Rafy Angelo MD   senna (SENOKOT) 8.6 MG tablet Take 1 tablet by mouth daily Takes one every other day    Historical Provider, MD   sodium chloride (OCEAN) 0.65 % nasal spray 1 spray into each nostril as needed for congestion 1/29/25 2/28/25  Destiny Solo MD   sulfamethoxazole-trimethoprim (BACTRIM DS) 800-160 mg per tablet Take 1 tablet by mouth 3 (three) times a week Starting 12/24, you can take one tablet Monday, Wednesday, and Fridays. 1/29/25 7/28/25  Destiny Solo MD   vitamin B-12 (VITAMIN B-12) 1,000 mcg tablet Take 1 tablet (1,000 mcg total) by mouth daily 9/14/23   ZULEYMA Boland     Allergies   Allergen Reactions     Doxycycline Headache    Keflex [Cephalexin] Rash    Neomycin-Polymyxin-Dexameth Eye Swelling       Objective :  Temp:  [100.3 °F (37.9 °C)] 100.3 °F (37.9 °C)  HR:  [] 77  BP: ()/(53-81) 97/53  Resp:  [18] 18  SpO2:  [90 %-100 %] 91 %  O2 Device: None (Room air)    Physical Exam  Constitutional:       General: She is not in acute distress.     Appearance: She is ill-appearing.   HENT:      Head: Normocephalic and atraumatic.      Mouth/Throat:      Mouth: Mucous membranes are moist.      Pharynx: Oropharynx is clear.   Eyes:      Extraocular Movements: Extraocular movements intact.      Pupils: Pupils are equal, round, and reactive to light.   Cardiovascular:      Rate and Rhythm: Normal rate and regular rhythm.      Pulses: Normal pulses.      Heart sounds: Normal heart sounds. No murmur heard.  Pulmonary:      Effort: Pulmonary effort is normal. No respiratory distress.      Breath sounds: Normal breath sounds. No wheezing, rhonchi or rales.   Abdominal:      General: Bowel sounds are normal. There is no distension.      Palpations: Abdomen is soft.      Tenderness: There is no abdominal tenderness. There is no guarding.   Musculoskeletal:         General: No swelling. Normal range of motion.      Right lower leg: No edema.      Left lower leg: No edema.   Skin:     General: Skin is warm and dry.      Capillary Refill: Capillary refill takes less than 2 seconds.   Neurological:      General: No focal deficit present.      Mental Status: She is oriented to person, place, and time.      Sensory: No sensory deficit.      Motor: No weakness.   Psychiatric:         Mood and Affect: Mood normal.         Behavior: Behavior normal.          Lines/Drains:            Lab Results: I have reviewed the following results:  Results from last 7 days   Lab Units 02/14/25  0800   WBC Thousand/uL 11.09*   HEMOGLOBIN g/dL 9.7*   HEMATOCRIT % 31.6*   PLATELETS Thousands/uL 295   BANDS PCT % 4   LYMPHO PCT % 3*   MONO PCT %  5   EOS PCT % 0     Results from last 7 days   Lab Units 02/14/25  0800   SODIUM mmol/L 139   POTASSIUM mmol/L 4.2   CHLORIDE mmol/L 106   CO2 mmol/L 23   BUN mg/dL 28*   CREATININE mg/dL 1.41*   ANION GAP mmol/L 10   CALCIUM mg/dL 8.7   ALBUMIN g/dL 3.7   TOTAL BILIRUBIN mg/dL 0.40   ALK PHOS U/L 51   ALT U/L 14   AST U/L 19   GLUCOSE RANDOM mg/dL 113     Results from last 7 days   Lab Units 02/14/25  0800   INR  1.25*         Lab Results   Component Value Date    HGBA1C 5.3 07/30/2024     Results from last 7 days   Lab Units 02/14/25  1122 02/14/25  0800   LACTIC ACID mmol/L 1.7 2.3*   PROCALCITONIN ng/ml  --  45.70*             Administrative Statements       ** Please Note: This note has been constructed using a voice recognition system. **

## 2025-02-14 NOTE — ASSESSMENT & PLAN NOTE
Presenting with fever that began last night measured at 101 F at home  She has just received her chemotherapy including carboplatin and pemetrexed as well as biologic therapy with Avastin yesterday  She has been regularly receiving these treatments every 3 weeks, she states fevers and fatigue are common reaction following her chemotherapy treatments  She also indicated presence of a dry cough  Presented to the ED tachycardic at 108, low grade fever of 100.3F, slight elevation of WBC at 11, Pro-Du elevated 45, LA elevated at 2.3  UA: No evidence of UTI  CXR: No evidence of acute process  No definitive source of infection at this time, fever could be due to developing infection versus malignancy sequela following chemotherapy administration    Plan:  Continue broad-spectrum antibiotic therapy with IV vancomycin every 12 hours, and cefepime 1 g every 24 hours, pharmacy consult for assistance with vancomycin dosing  Given 2 L bolus fluids in the ED, will continue with maintenance IV  fluids  Lactic acid trending down at 1.7 following initial fluid administration, will check an AM lactic acid  Check AM procal   As needed Tylenol for fevers  Monitor fever curve  Check daily CBC

## 2025-02-14 NOTE — ASSESSMENT & PLAN NOTE
KDIGO guidelines define DILLON as an increase in Creatinine of 0.3 mg/dL over a 48 hour period, or an increase in Creatinine (over 7 days) of greater than 1.5 times the baseline.     Recent Labs     02/14/25  0800   CREATININE 1.41*   EGFR 36     Estimated Creatinine Clearance: 27.7 mL/min (A) (by C-G formula based on SCr of 1.41 mg/dL (H)).    (baseline 0.8-1.0)  Could be due to recent administration of pemetrexed yesterday for chemotherapy  Will start maintenance fluids at this time with isolate  Monitor daily BMP, avoid nephrotoxic agents

## 2025-02-14 NOTE — ASSESSMENT & PLAN NOTE
Blood pressure is currently stable however not at goal  Will hold home amlodipine 5 mg in the setting of infection

## 2025-02-14 NOTE — ASSESSMENT & PLAN NOTE
History of pulmonary embolism which occurred in November 2024  Takes Xarelto 20 mg daily at home    Plan:  Given DILLON on admission will hold Xarelto for now and replace with Eliquis 2.5 mg twice daily  Consider replacing Xarelto with Eliquis on discharge

## 2025-02-14 NOTE — ASSESSMENT & PLAN NOTE
Anemia is chronic most likely secondary to chemotherapy  Hemoglobin on admission 9.7 stable    Plan:  Continue to trend daily CBC  Monitor for signs of active bleeding

## 2025-02-15 PROBLEM — R65.20 SEVERE SEPSIS (HCC): Status: ACTIVE | Noted: 2025-02-15

## 2025-02-15 PROBLEM — R50.9 FEVER: Status: RESOLVED | Noted: 2025-02-14 | Resolved: 2025-02-15

## 2025-02-15 PROBLEM — A41.9 SEVERE SEPSIS (HCC): Status: ACTIVE | Noted: 2025-02-15

## 2025-02-15 LAB
ANION GAP SERPL CALCULATED.3IONS-SCNC: 5 MMOL/L (ref 4–13)
BASOPHILS # BLD AUTO: 0.01 THOUSANDS/ΜL (ref 0–0.1)
BASOPHILS NFR BLD AUTO: 0 % (ref 0–1)
BUN SERPL-MCNC: 29 MG/DL (ref 5–25)
CALCIUM SERPL-MCNC: 8.4 MG/DL (ref 8.4–10.2)
CHLORIDE SERPL-SCNC: 111 MMOL/L (ref 96–108)
CO2 SERPL-SCNC: 24 MMOL/L (ref 21–32)
CREAT SERPL-MCNC: 1.17 MG/DL (ref 0.6–1.3)
EOSINOPHIL # BLD AUTO: 0 THOUSAND/ΜL (ref 0–0.61)
EOSINOPHIL NFR BLD AUTO: 0 % (ref 0–6)
ERYTHROCYTE [DISTWIDTH] IN BLOOD BY AUTOMATED COUNT: 16.2 % (ref 11.6–15.1)
GFR SERPL CREATININE-BSD FRML MDRD: 46 ML/MIN/1.73SQ M
GLUCOSE SERPL-MCNC: 112 MG/DL (ref 65–140)
HCT VFR BLD AUTO: 26.1 % (ref 34.8–46.1)
HGB BLD-MCNC: 8 G/DL (ref 11.5–15.4)
IMM GRANULOCYTES # BLD AUTO: 0.18 THOUSAND/UL (ref 0–0.2)
IMM GRANULOCYTES NFR BLD AUTO: 2 % (ref 0–2)
LACTATE SERPL-SCNC: 1.2 MMOL/L (ref 0.5–2)
LYMPHOCYTES # BLD AUTO: 0.17 THOUSANDS/ΜL (ref 0.6–4.47)
LYMPHOCYTES NFR BLD AUTO: 2 % (ref 14–44)
MAGNESIUM SERPL-MCNC: 1.9 MG/DL (ref 1.9–2.7)
MCH RBC QN AUTO: 29.6 PG (ref 26.8–34.3)
MCHC RBC AUTO-ENTMCNC: 30.7 G/DL (ref 31.4–37.4)
MCV RBC AUTO: 97 FL (ref 82–98)
MONOCYTES # BLD AUTO: 0.14 THOUSAND/ΜL (ref 0.17–1.22)
MONOCYTES NFR BLD AUTO: 2 % (ref 4–12)
NEUTROPHILS # BLD AUTO: 7.42 THOUSANDS/ΜL (ref 1.85–7.62)
NEUTS SEG NFR BLD AUTO: 94 % (ref 43–75)
NRBC BLD AUTO-RTO: 0 /100 WBCS
PLATELET # BLD AUTO: 143 THOUSANDS/UL (ref 149–390)
PMV BLD AUTO: 9.8 FL (ref 8.9–12.7)
POTASSIUM SERPL-SCNC: 4.7 MMOL/L (ref 3.5–5.3)
PROCALCITONIN SERPL-MCNC: 23.18 NG/ML
RBC # BLD AUTO: 2.7 MILLION/UL (ref 3.81–5.12)
SODIUM SERPL-SCNC: 140 MMOL/L (ref 135–147)
VANCOMYCIN SERPL-MCNC: 9.3 UG/ML (ref 10–20)
WBC # BLD AUTO: 7.92 THOUSAND/UL (ref 4.31–10.16)

## 2025-02-15 PROCEDURE — 80048 BASIC METABOLIC PNL TOTAL CA: CPT

## 2025-02-15 PROCEDURE — 99232 SBSQ HOSP IP/OBS MODERATE 35: CPT | Performed by: INTERNAL MEDICINE

## 2025-02-15 PROCEDURE — 83605 ASSAY OF LACTIC ACID: CPT

## 2025-02-15 PROCEDURE — 83735 ASSAY OF MAGNESIUM: CPT

## 2025-02-15 PROCEDURE — 85025 COMPLETE CBC W/AUTO DIFF WBC: CPT

## 2025-02-15 PROCEDURE — 80202 ASSAY OF VANCOMYCIN: CPT

## 2025-02-15 PROCEDURE — 84145 PROCALCITONIN (PCT): CPT

## 2025-02-15 RX ADMIN — CYANOCOBALAMIN TAB 500 MCG 1000 MCG: 500 TAB at 10:07

## 2025-02-15 RX ADMIN — GABAPENTIN 100 MG: 100 CAPSULE ORAL at 17:58

## 2025-02-15 RX ADMIN — APIXABAN 2.5 MG: 2.5 TABLET, FILM COATED ORAL at 17:58

## 2025-02-15 RX ADMIN — APIXABAN 2.5 MG: 2.5 TABLET, FILM COATED ORAL at 10:07

## 2025-02-15 RX ADMIN — DEXAMETHASONE 3 MG: 0.5 TABLET ORAL at 10:16

## 2025-02-15 RX ADMIN — SENNOSIDES 8.6 MG: 8.6 TABLET ORAL at 10:07

## 2025-02-15 RX ADMIN — ACETAMINOPHEN 650 MG: 325 TABLET, FILM COATED ORAL at 11:13

## 2025-02-15 RX ADMIN — LEVOTHYROXINE SODIUM 50 MCG: 0.05 TABLET ORAL at 05:42

## 2025-02-15 RX ADMIN — LEVETIRACETAM 1000 MG: 500 TABLET, FILM COATED ORAL at 10:07

## 2025-02-15 RX ADMIN — PANTOPRAZOLE SODIUM 40 MG: 40 TABLET, DELAYED RELEASE ORAL at 05:43

## 2025-02-15 RX ADMIN — LEVETIRACETAM 1000 MG: 500 TABLET, FILM COATED ORAL at 17:58

## 2025-02-15 RX ADMIN — GABAPENTIN 300 MG: 300 CAPSULE ORAL at 22:02

## 2025-02-15 RX ADMIN — GABAPENTIN 100 MG: 100 CAPSULE ORAL at 10:07

## 2025-02-15 RX ADMIN — CEFTRIAXONE SODIUM 1000 MG: 10 INJECTION, POWDER, FOR SOLUTION INTRAVENOUS at 10:08

## 2025-02-15 RX ADMIN — FOLIC ACID 1 MG: 1 TABLET ORAL at 10:07

## 2025-02-15 RX ADMIN — ACETAMINOPHEN 650 MG: 325 TABLET, FILM COATED ORAL at 22:02

## 2025-02-15 RX ADMIN — DEXAMETHASONE 3 MG: 0.5 TABLET ORAL at 17:58

## 2025-02-15 NOTE — ASSESSMENT & PLAN NOTE
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 Last treatment on 2/13, planned to continue q3 weeks  Most recent CT head without contrast 2/3/2025: Evidence of parietal and occipital vasogenic edema without significant change from prior 12/27/2024  Currently on steroid taper with Decadron which started on 2/4, and is anticipated to end on Tuesday 2/25. Previous admission was complicated by symptoms of AMS with discontinuation of steroids  Takes Keppra 1000 mg BID for seizure prophylaxis    Plan:  Continue steroid taper with Decadron and monitor for AMS  Continue Keppra 1000 mg twice daily

## 2025-02-15 NOTE — ASSESSMENT & PLAN NOTE
Met criteria for severe sepsis with tachycardia, a fever measured at 101 F at home, new cough (possible source), pro-calcitonin of 45.7, and lactic acid of 2.3  2/13 received her chemotherapy including carboplatin, pemetrexed, and biologic Avastin  Treatments occurring regularly q3 weeks, often experiencing fevers, fatigue, and mild cough after treatment   Negative flu/COVID/RSV, strep pneumo and legionella urinary antigen negative  2/14 CXR w/o evidence of acute cardiopulmonary disease  2/14 UA: No evidence of UTI  Given 30 cc/kg sepsis fluids with 2 liters of fluid and initiation of maintenance fluids with IV NA at 75 cc/hr. Repeat lactic acid improved to 1.7  Initiated on IV vancomycin per pharmacy q24h and IV cefepime 1 g q24h. DRIP score of 4, non-severe, will adjust abx regimen accordingly  2/15 she was afebrile, with HRs in the 60s-70s. She notes that her cough seems worse today, though she denies shortness of breath or productivity  2/15 AM labs with improvement in pro-calcitonin to 23.18 and further improvement in lactic acid of 1.2  On my evaluation, she does not appear to be in any distress and was having minimal coughing (though she notes it has worsened compared to the day prior). It remains non-productive. She also c/o a headache, and was encouraged to take tylenol as needed. Physical exam was unremarkable.   With these changes, it is possible that her symptoms and initial presentation to a chemotherapy reaction    Plan:  Discontinued IV cefepime 1g q24h and IV vancomycin in favor of IV ceftriaxone 1g q24h  Continue to follow pro-calcitonin and CBC/BMP  Since DILLON has resolved (see problem), and she is tolerating PO intake, d/c maintenance fluids with IV NS at 75 cc/hr  Trend fever curve, with Tylenol 650 mg for fever, headache, and mild pain

## 2025-02-15 NOTE — ASSESSMENT & PLAN NOTE
Presenting with fever that began last night measured at 101 F at home    Presented to the ED tachycardic at 108, low grade fever of 100.3F, slight elevation of WBC at 11, Pro-Du elevated 45, LA elevated at 2.3  UA: No evidence of UTI  CXR: No evidence of acute process  No definitive source of infection at this time, fever could be due to developing infection versus malignancy sequela following chemotherapy administration    Plan:  Continue broad-spectrum antibiotic therapy with IV vancomycin every 12 hours, and cefepime 1 g every 24 hours, pharmacy consult for assistance with vancomycin dosing  Given 2 L bolus fluids in the ED, will continue with maintenance IV  fluids  Lactic acid trending down at 1.7 following initial fluid administration, will check an AM lactic acid  Check AM procal   As needed Tylenol for fevers  Monitor fever curve  Check daily CBC

## 2025-02-15 NOTE — PLAN OF CARE
Problem: PAIN - ADULT  Goal: Verbalizes/displays adequate comfort level or baseline comfort level  Description: Interventions:  - Encourage patient to monitor pain and request assistance  - Assess pain using appropriate pain scale  - Administer analgesics based on type and severity of pain and evaluate response  - Implement non-pharmacological measures as appropriate and evaluate response  - Consider cultural and social influences on pain and pain management  - Notify physician/advanced practitioner if interventions unsuccessful or patient reports new pain  Outcome: Progressing     Problem: INFECTION - ADULT  Goal: Absence or prevention of progression during hospitalization  Description: INTERVENTIONS:  - Assess and monitor for signs and symptoms of infection  - Monitor lab/diagnostic results  - Monitor all insertion sites, i.e. indwelling lines, tubes, and drains  - Monitor endotracheal if appropriate and nasal secretions for changes in amount and color  - [place] appropriate cooling/warming therapies per order  - Administer medications as ordered  - Instruct and encourage patient and family to use good hand hygiene technique  - Identify and instruct in appropriate isolation precautions for identified infection/condition  Outcome: Progressing     Problem: SAFETY ADULT  Goal: Patient will remain free of falls  Description: INTERVENTIONS:  - Educate patient/family on patient safety including physical limitations  - Instruct patient to call for assistance with activity   - Consult OT/PT to assist with strengthening/mobility   - Keep Call bell within reach  - Keep bed low and locked with side rails adjusted as appropriate  - Keep care items and personal belongings within reach  - Initiate and maintain comfort rounds  - Make Fall Risk Sign visible to staff  - Offer Toileting every  Hours, in advance of need  - Initiate/Maintain alarm  - Obtain necessary fall risk management equipment: - Apply yellow socks and  bracelet for high fall risk patients  - Consider moving patient to room near nurses station  Outcome: Progressing  Goal: Maintain or return to baseline ADL function  Description: INTERVENTIONS:  -  Assess patient's ability to carry out ADLs; assess patient's baseline for ADL function and identify physical deficits which impact ability to perform ADLs (bathing, care of mouth/teeth, toileting, grooming, dressing, etc.)  - Assess/evaluate cause of self-care deficits   - Assess range of motion  - Assess patient's mobility; develop plan if impaired  - Assess patient's need for assistive devices and provide as appropriate  - Encourage maximum independence but intervene and supervise when necessary  - Involve family in performance of ADLs  - Assess for home care needs following discharge   - Consider OT consult to assist with ADL evaluation and planning for discharge  - Provide patient education as appropriate  Outcome: Progressing  Goal: Maintains/Returns to pre admission functional level  Description: INTERVENTIONS:  - Perform AM-PAC 6 Click Basic Mobility/ Daily Activity assessment daily.  - Set and communicate daily mobility goal to care team and patient/family/caregiver.   - Collaborate with rehabilitation services on mobility goals if consulted  - Perform Range of Motion  times a day.  - Reposition patient every  hours.  - Dangle patient  times a day  - Stand patient  times a day  - Ambulate patient  times a day  - Out of bed to chair  times a day   - Out of bed for meals times a day  - Out of bed for toileting  - Record patient progress and toleration of activity level   Outcome: Progressing     Problem: DISCHARGE PLANNING  Goal: Discharge to home or other facility with appropriate resources  Description: INTERVENTIONS:  - Identify barriers to discharge w/patient and caregiver  - Arrange for needed discharge resources and transportation as appropriate  - Identify discharge learning needs (meds, wound care, etc.)  -  Arrange for interpretive services to assist at discharge as needed  - Refer to Case Management Department for coordinating discharge planning if the patient needs post-hospital services based on physician/advanced practitioner order or complex needs related to functional status, cognitive ability, or social support system  Outcome: Progressing     Problem: Knowledge Deficit  Goal: Patient/family/caregiver demonstrates understanding of disease process, treatment plan, medications, and discharge instructions  Description: Complete learning assessment and assess knowledge base.  Interventions:  - Provide teaching at level of understanding  - Provide teaching via preferred learning methods  Outcome: Progressing

## 2025-02-15 NOTE — ASSESSMENT & PLAN NOTE
Blood pressure is currently stable, however not at goal    Plan:  Will hold home amlodipine 5 mg in the setting of possible infection  Continue monitoring vitals. Can resume if needed

## 2025-02-15 NOTE — PROGRESS NOTES
Ayla Nye is a 74 y.o. female who is currently ordered Vancomycin IV with management by the Pharmacy Consult service.  Relevant clinical data and objective / subjective history reviewed.  Vancomycin Assessment:  Indication and Goal AUC/Trough: Pneumonia (goal -600, trough >10)  Micro:     Renal Function:  SCr: 1.17 mg/dL  CrCl: 33.2 mL/min  Renal replacement: Not on dialysis  Days of Therapy: 2  Current Dose: 750 mg IV prn level <15  Vancomycin Plan:  New Dosin mg IV every 24 hours  Estimated AUC: 529 mcg*hr/mL  Estimated Trough: 14.7 mcg/mL  Next Level:  at 0600  Renal Function Monitoring: Daily BMP and UOP  Pharmacy will continue to follow closely for s/sx of nephrotoxicity, infusion reactions and appropriateness of therapy.  BMP and CBC will be ordered per protocol. We will continue to follow the patient’s culture results and clinical progress daily.    Geovanna Amos, Pharmacist

## 2025-02-15 NOTE — ASSESSMENT & PLAN NOTE
KDIGO guidelines define DILLON as an increase in Creatinine of 0.3 mg/dL over a 48 hour period, or an increase in Creatinine (over 7 days) of greater than 1.5 times the baseline.     Recent Labs     02/14/25  0800 02/15/25  0431   CREATININE 1.41* 1.17   EGFR 36 46     Estimated Creatinine Clearance: 33.4 mL/min (by C-G formula based on SCr of 1.17 mg/dL).    Baseline 0.8-1.0  Could be due to recent administration of pemetrexed yesterday for chemotherapy  Initiated on maintenance fluids with IV NS at 75 cc/hr  2/14 BMP showed resolution, with an improved creatinine of 1.17    Plan:  Monitor daily BMP until creatinine returns to baseline  Encourage adequate PO solid and fluid intake  Avoid nephrotoxic agents

## 2025-02-15 NOTE — PROGRESS NOTES
Progress Note - Hospitalist   Name: Ayla Nye 74 y.o. female I MRN: [id number]  Unit/Bed#: -01 I Date of Admission: 2/14/2025   Date of Service: 2/15/2025 I Hospital Day: 1    Assessment & Plan  Severe sepsis (HCC)  Met criteria for severe sepsis with tachycardia, a fever measured at 101 F at home, new cough (possible source), pro-calcitonin of 45.7, and lactic acid of 2.3  2/13 received her chemotherapy including carboplatin, pemetrexed, and biologic Avastin  Treatments occurring regularly q3 weeks, often experiencing fevers, fatigue, and mild cough after treatment   Negative flu/COVID/RSV, strep pneumo and legionella urinary antigen negative  2/14 CXR w/o evidence of acute cardiopulmonary disease  2/14 UA: No evidence of UTI  Given 30 cc/kg sepsis fluids with 2 liters of fluid and initiation of maintenance fluids with IV NA at 75 cc/hr. Repeat lactic acid improved to 1.7  Initiated on IV vancomycin per pharmacy q24h and IV cefepime 1 g q24h. DRIP score of 4, non-severe, will adjust abx regimen accordingly  2/15 she was afebrile, with HRs in the 60s-70s. She notes that her cough seems worse today, though she denies shortness of breath or productivity  2/15 AM labs with improvement in pro-calcitonin to 23.18 and further improvement in lactic acid of 1.2  On my evaluation, she does not appear to be in any distress and was having minimal coughing (though she notes it has worsened compared to the day prior). It remains non-productive. She also c/o a headache, and was encouraged to take tylenol as needed. Physical exam was unremarkable.   With these changes, it is possible that her symptoms and initial presentation to a chemotherapy reaction    Plan:  Discontinued IV cefepime 1g q24h and IV vancomycin in favor of IV ceftriaxone 1g q24h  Continue to follow pro-calcitonin and CBC/BMP  Since DILLON has resolved (see problem), and she is tolerating PO intake, d/c maintenance fluids with IV NS at 75 cc/hr  Trend  fever curve, with Tylenol 650 mg for fever, headache, and mild pain  Metastatic adenocarcinoma (HCC)  Metastatic adenocarcinoma of the lungs with mets to the brain diagnosed in April 2024  Received radiation therapy from July to September  Currently on chemotherapy and biologic therapy with carboplatin, pemetrexed, and Avastin. Last treatment on 2/13, planned to continue q3 weeks  Most recent CT head without contrast 2/3/2025: Evidence of parietal and occipital vasogenic edema without significant change from prior 12/27/2024  Currently on steroid taper with Decadron which started on 2/4, and is anticipated to end on Tuesday 2/25. Previous admission was complicated by symptoms of AMS with discontinuation of steroids  Takes Keppra 1000 mg BID for seizure prophylaxis    Plan:  Continue steroid taper with Decadron and monitor for AMS  Continue Keppra 1000 mg twice daily    DILLON (acute kidney injury) (HCC)  KDIGO guidelines define DILLON as an increase in Creatinine of 0.3 mg/dL over a 48 hour period, or an increase in Creatinine (over 7 days) of greater than 1.5 times the baseline.     Recent Labs     02/14/25  0800 02/15/25  0431   CREATININE 1.41* 1.17   EGFR 36 46     Estimated Creatinine Clearance: 33.4 mL/min (by C-G formula based on SCr of 1.17 mg/dL).    Baseline 0.8-1.0  Could be due to recent administration of pemetrexed yesterday for chemotherapy  Initiated on maintenance fluids with IV NS at 75 cc/hr  2/14 BMP showed resolution, with an improved creatinine of 1.17    Plan:  Monitor daily BMP until creatinine returns to baseline  Encourage adequate PO solid and fluid intake  Avoid nephrotoxic agents    History of Pneumocystis jirovecii pneumonia  Currently takes Bactrim 3 times weekly every MWF    Plan:  Continue Bactrim regimen while inpatient  Hypertension  Blood pressure is currently stable, however not at goal    Plan:  Will hold home amlodipine 5 mg in the setting of possible infection  Continue monitoring  vitals. Can resume if needed  GERD (gastroesophageal reflux disease)  Continue home Protonix 40 mg daily  Hypothyroidism  Continue home levothyroxine 50 mcg daily  History of pulmonary embolus (PE)  History of pulmonary embolism which occurred in November 2024  Takes Xarelto 20 mg daily at home    Plan:  Given DILLON on admission will hold Xarelto for now and replace with Eliquis 2.5 mg twice daily  Consider replacing Xarelto with Eliquis on discharge  Anemia  Anemia is chronic most likely secondary to chemotherapy  Hemoglobin on admission 9.7 stable, 2/14 8.0 (likely hemodilution)    Plan:  Continue to trend daily CBC  Monitor for signs of active bleeding    VTE Pharmacologic Prophylaxis: VTE Score: 9 High Risk (Score >/= 5) - Pharmacological DVT Prophylaxis Ordered: apixaban (Eliquis). Sequential Compression Devices Ordered.    Mobility:   Basic Mobility Inpatient Raw Score: 17  -[facility] Goal: 5: Stand one or more mins  JH-HLM Achieved: 2: Bed activities/Dependent transfer  JH-HLM Goal NOT achieved. Continue with multidisciplinary rounding and encourage appropriate mobility to improve upon -HLM goals.    Patient Centered Rounds: I performed bedside rounds with nursing staff today.   Discussions with Specialists or Other Care Team Provider:     Education and Discussions with Family / Patient: Updated  (daughter) via phone.    Current Length of Stay: 1 day(s)  Current Patient Status: Inpatient   Certification Statement: The patient will continue to require additional inpatient hospital stay due to further evaluation of infection  Discharge Plan: Anticipate discharge in 24-48 hrs to home.    Code Status: Level 1 - Full Code    Nora Aguila was seen and evaluated at the bedside. She did not appear to be in any distress, and was happy to know that her labs have been improving. She stated that she felt like her cough was becoming more severe, though she had minimal spells in the room. It was a dry,  non-productive cough. She also c/o a headache, and was encouraged to ask for Tylenol.    Objective :  Temp:  [97.6 °F (36.4 °C)-97.9 °F (36.6 °C)] 97.9 °F (36.6 °C)  HR:  [63-84] 65  BP: ()/(51-72) 144/68  Resp:  [18] 18  SpO2:  [91 %-92 %] 91 %  O2 Device: None (Room air)    Body mass index is 23.43 kg/m².     Input and Output Summary (last 24 hours):     Intake/Output Summary (Last 24 hours) at 2/15/2025 1036  Last data filed at 2/15/2025 0927  Gross per 24 hour   Intake 2918 ml   Output --   Net 2918 ml       Physical Exam  Vitals reviewed.   Constitutional:       General: She is not in acute distress.     Appearance: Normal appearance. She is not ill-appearing.   HENT:      Head: Normocephalic and atraumatic.      Mouth/Throat:      Mouth: Mucous membranes are dry.      Pharynx: Oropharynx is clear.   Eyes:      General: No scleral icterus.     Extraocular Movements: Extraocular movements intact.      Conjunctiva/sclera: Conjunctivae normal.   Cardiovascular:      Rate and Rhythm: Normal rate and regular rhythm.      Pulses: Normal pulses.      Heart sounds: Normal heart sounds.   Pulmonary:      Effort: Pulmonary effort is normal.      Breath sounds: Normal air entry. Examination of the right-upper field reveals decreased breath sounds. Decreased breath sounds present.   Abdominal:      General: Abdomen is flat. There is no distension.      Palpations: Abdomen is soft.      Tenderness: There is no abdominal tenderness. There is no guarding.   Musculoskeletal:      Right lower leg: No edema.      Left lower leg: No edema.   Skin:     General: Skin is warm and dry.   Neurological:      General: No focal deficit present.      Mental Status: She is alert and oriented to person, place, and time.   Psychiatric:         Mood and Affect: Mood normal.         Behavior: Behavior normal.           Lines/Drains:              Lab Results: I have reviewed the following results:   Results from last 7 days   Lab Units  02/15/25  0431 02/14/25  0800   WBC Thousand/uL 7.92 11.09*   HEMOGLOBIN g/dL 8.0* 9.7*   HEMATOCRIT % 26.1* 31.6*   PLATELETS Thousands/uL 143* 295   BANDS PCT %  --  4   SEGS PCT % 94*  --    LYMPHO PCT % 2* 3*   MONO PCT % 2* 5   EOS PCT % 0 0     Results from last 7 days   Lab Units 02/15/25  0431 02/14/25  0800   SODIUM mmol/L 140 139   POTASSIUM mmol/L 4.7 4.2   CHLORIDE mmol/L 111* 106   CO2 mmol/L 24 23   BUN mg/dL 29* 28*   CREATININE mg/dL 1.17 1.41*   ANION GAP mmol/L 5 10   CALCIUM mg/dL 8.4 8.7   ALBUMIN g/dL  --  3.7   TOTAL BILIRUBIN mg/dL  --  0.40   ALK PHOS U/L  --  51   ALT U/L  --  14   AST U/L  --  19   GLUCOSE RANDOM mg/dL 112 113     Results from last 7 days   Lab Units 02/14/25  0800   INR  1.25*             Results from last 7 days   Lab Units 02/15/25  0431 02/14/25  1122 02/14/25  0800   LACTIC ACID mmol/L 1.2 1.7 2.3*   PROCALCITONIN ng/ml 23.18*  --  45.70*       Recent Cultures (last 7 days):   Results from last 7 days   Lab Units 02/14/25  1622 02/14/25  0800   BLOOD CULTURE   --  Received in Microbiology Lab. Culture in Progress.  Received in Microbiology Lab. Culture in Progress.   LEGIONELLA URINARY ANTIGEN  Negative  --        Imaging Results Review: I reviewed radiology reports from this admission including: chest xray and CT head.  Other Study Results Review: EKG was reviewed.     Last 24 Hours Medication List:     Current Facility-Administered Medications:     acetaminophen (TYLENOL) tablet 650 mg, Q6H PRN    [Held by provider] amLODIPine (NORVASC) tablet 5 mg, Daily    apixaban (ELIQUIS) tablet 2.5 mg, BID    ceftriaxone (ROCEPHIN) 1 g/50 mL in dextrose IVPB, Q24H, Last Rate: 1,000 mg (02/15/25 1008)    cyanocobalamin (VITAMIN B-12) tablet 1,000 mcg, Daily    dexamethasone (DECADRON) tablet 3 mg, BID With Meals **FOLLOWED BY** [START ON 2/18/2025] dexamethasone (DECADRON) tablet 2 mg, BID With Meals    folic acid (FOLVITE) tablet 1 mg, Daily    gabapentin (NEURONTIN) capsule  100 mg, BID    gabapentin (NEURONTIN) capsule 300 mg, HS    levETIRAcetam (KEPPRA) tablet 1,000 mg, BID    levothyroxine tablet 50 mcg, Early Morning    pantoprazole (PROTONIX) EC tablet 40 mg, Daily Before Breakfast    senna (SENOKOT) tablet 8.6 mg, Daily    sodium chloride (OCEAN) 0.65 % nasal spray 1 spray, Q1H PRN    sodium chloride 0.9 % infusion, Continuous, Last Rate: 75 mL/hr (02/15/25 0557)    sulfamethoxazole-trimethoprim (BACTRIM DS) 800-160 mg per tablet 1 tablet, Once per day on Monday Wednesday Friday    Facility-Administered Medications Ordered in Other Encounters:     [MAR Hold] acetaminophen (TYLENOL) tablet 650 mg, Q6H PRN    [MAR Hold] alteplase (CATHFLO) injection 2 mg, Q1MIN PRN    Administrative Statements   Today, Patient Was Seen By: Nimco Patel DO      **Please Note: This note may have been constructed using a voice recognition system.**

## 2025-02-15 NOTE — ASSESSMENT & PLAN NOTE
Currently takes Bactrim 3 times weekly every MWF    Plan:  Continue Bactrim regimen while inpatient

## 2025-02-15 NOTE — ASSESSMENT & PLAN NOTE
Anemia is chronic most likely secondary to chemotherapy  Hemoglobin on admission 9.7 stable, 2/14 8.0 (likely hemodilution)    Plan:  Continue to trend daily CBC  Monitor for signs of active bleeding

## 2025-02-16 PROBLEM — N17.9 AKI (ACUTE KIDNEY INJURY) (HCC): Status: RESOLVED | Noted: 2025-02-14 | Resolved: 2025-02-16

## 2025-02-16 LAB
ANION GAP SERPL CALCULATED.3IONS-SCNC: 9 MMOL/L (ref 4–13)
BASOPHILS # BLD AUTO: 0.01 THOUSANDS/ΜL (ref 0–0.1)
BASOPHILS NFR BLD AUTO: 0 % (ref 0–1)
BUN SERPL-MCNC: 24 MG/DL (ref 5–25)
CALCIUM SERPL-MCNC: 8.7 MG/DL (ref 8.4–10.2)
CHLORIDE SERPL-SCNC: 109 MMOL/L (ref 96–108)
CO2 SERPL-SCNC: 20 MMOL/L (ref 21–32)
CREAT SERPL-MCNC: 0.95 MG/DL (ref 0.6–1.3)
EOSINOPHIL # BLD AUTO: 0.01 THOUSAND/ΜL (ref 0–0.61)
EOSINOPHIL NFR BLD AUTO: 0 % (ref 0–6)
ERYTHROCYTE [DISTWIDTH] IN BLOOD BY AUTOMATED COUNT: 15.9 % (ref 11.6–15.1)
GFR SERPL CREATININE-BSD FRML MDRD: 59 ML/MIN/1.73SQ M
GLUCOSE SERPL-MCNC: 84 MG/DL (ref 65–140)
HCT VFR BLD AUTO: 26.9 % (ref 34.8–46.1)
HGB BLD-MCNC: 8.2 G/DL (ref 11.5–15.4)
IMM GRANULOCYTES # BLD AUTO: 0.07 THOUSAND/UL (ref 0–0.2)
IMM GRANULOCYTES NFR BLD AUTO: 1 % (ref 0–2)
LYMPHOCYTES # BLD AUTO: 0.2 THOUSANDS/ΜL (ref 0.6–4.47)
LYMPHOCYTES NFR BLD AUTO: 2 % (ref 14–44)
MAGNESIUM SERPL-MCNC: 1.8 MG/DL (ref 1.9–2.7)
MCH RBC QN AUTO: 29.3 PG (ref 26.8–34.3)
MCHC RBC AUTO-ENTMCNC: 30.5 G/DL (ref 31.4–37.4)
MCV RBC AUTO: 96 FL (ref 82–98)
MONOCYTES # BLD AUTO: 0.04 THOUSAND/ΜL (ref 0.17–1.22)
MONOCYTES NFR BLD AUTO: 1 % (ref 4–12)
NEUTROPHILS # BLD AUTO: 8.19 THOUSANDS/ΜL (ref 1.85–7.62)
NEUTS SEG NFR BLD AUTO: 96 % (ref 43–75)
NRBC BLD AUTO-RTO: 0 /100 WBCS
PLATELET # BLD AUTO: 140 THOUSANDS/UL (ref 149–390)
PMV BLD AUTO: 10.4 FL (ref 8.9–12.7)
POTASSIUM SERPL-SCNC: 4.3 MMOL/L (ref 3.5–5.3)
PROCALCITONIN SERPL-MCNC: 12.1 NG/ML
RBC # BLD AUTO: 2.8 MILLION/UL (ref 3.81–5.12)
SODIUM SERPL-SCNC: 138 MMOL/L (ref 135–147)
WBC # BLD AUTO: 8.52 THOUSAND/UL (ref 4.31–10.16)

## 2025-02-16 PROCEDURE — 87505 NFCT AGENT DETECTION GI: CPT

## 2025-02-16 PROCEDURE — 83735 ASSAY OF MAGNESIUM: CPT | Performed by: INTERNAL MEDICINE

## 2025-02-16 PROCEDURE — 85027 COMPLETE CBC AUTOMATED: CPT | Performed by: INTERNAL MEDICINE

## 2025-02-16 PROCEDURE — 80048 BASIC METABOLIC PNL TOTAL CA: CPT | Performed by: INTERNAL MEDICINE

## 2025-02-16 PROCEDURE — 99232 SBSQ HOSP IP/OBS MODERATE 35: CPT | Performed by: INTERNAL MEDICINE

## 2025-02-16 PROCEDURE — 84145 PROCALCITONIN (PCT): CPT | Performed by: INTERNAL MEDICINE

## 2025-02-16 RX ORDER — SODIUM CHLORIDE, SODIUM GLUCONATE, SODIUM ACETATE, POTASSIUM CHLORIDE, MAGNESIUM CHLORIDE, SODIUM PHOSPHATE, DIBASIC, AND POTASSIUM PHOSPHATE .53; .5; .37; .037; .03; .012; .00082 G/100ML; G/100ML; G/100ML; G/100ML; G/100ML; G/100ML; G/100ML
50 INJECTION, SOLUTION INTRAVENOUS CONTINUOUS
Status: DISCONTINUED | OUTPATIENT
Start: 2025-02-16 | End: 2025-02-17

## 2025-02-16 RX ORDER — AMLODIPINE BESYLATE 5 MG/1
5 TABLET ORAL DAILY
Status: DISCONTINUED | OUTPATIENT
Start: 2025-02-16 | End: 2025-02-19 | Stop reason: HOSPADM

## 2025-02-16 RX ORDER — ONDANSETRON 2 MG/ML
4 INJECTION INTRAMUSCULAR; INTRAVENOUS EVERY 4 HOURS PRN
Status: DISCONTINUED | OUTPATIENT
Start: 2025-02-16 | End: 2025-02-19 | Stop reason: HOSPADM

## 2025-02-16 RX ORDER — ACETAMINOPHEN 325 MG/1
650 TABLET ORAL EVERY 4 HOURS PRN
Status: DISCONTINUED | OUTPATIENT
Start: 2025-02-16 | End: 2025-02-17

## 2025-02-16 RX ORDER — MAGNESIUM SULFATE HEPTAHYDRATE 40 MG/ML
2 INJECTION, SOLUTION INTRAVENOUS ONCE
Status: COMPLETED | OUTPATIENT
Start: 2025-02-16 | End: 2025-02-16

## 2025-02-16 RX ORDER — ACETAMINOPHEN 325 MG/1
975 TABLET ORAL EVERY 6 HOURS PRN
Status: DISCONTINUED | OUTPATIENT
Start: 2025-02-16 | End: 2025-02-16

## 2025-02-16 RX ADMIN — AMLODIPINE BESYLATE 5 MG: 5 TABLET ORAL at 12:46

## 2025-02-16 RX ADMIN — ONDANSETRON 4 MG: 2 INJECTION INTRAMUSCULAR; INTRAVENOUS at 17:16

## 2025-02-16 RX ADMIN — ONDANSETRON 4 MG: 2 INJECTION INTRAMUSCULAR; INTRAVENOUS at 10:52

## 2025-02-16 RX ADMIN — LEVETIRACETAM 1000 MG: 500 TABLET, FILM COATED ORAL at 17:11

## 2025-02-16 RX ADMIN — ONDANSETRON 4 MG: 2 INJECTION INTRAMUSCULAR; INTRAVENOUS at 06:25

## 2025-02-16 RX ADMIN — FOLIC ACID 1 MG: 1 TABLET ORAL at 08:34

## 2025-02-16 RX ADMIN — ACETAMINOPHEN 650 MG: 325 TABLET, FILM COATED ORAL at 08:34

## 2025-02-16 RX ADMIN — MAGNESIUM SULFATE HEPTAHYDRATE 2 G: 40 INJECTION, SOLUTION INTRAVENOUS at 07:28

## 2025-02-16 RX ADMIN — PANTOPRAZOLE SODIUM 40 MG: 40 TABLET, DELAYED RELEASE ORAL at 06:28

## 2025-02-16 RX ADMIN — SODIUM CHLORIDE, SODIUM GLUCONATE, SODIUM ACETATE, POTASSIUM CHLORIDE, MAGNESIUM CHLORIDE, SODIUM PHOSPHATE, DIBASIC, AND POTASSIUM PHOSPHATE 50 ML/HR: .53; .5; .37; .037; .03; .012; .00082 INJECTION, SOLUTION INTRAVENOUS at 14:40

## 2025-02-16 RX ADMIN — APIXABAN 2.5 MG: 2.5 TABLET, FILM COATED ORAL at 17:12

## 2025-02-16 RX ADMIN — LEVETIRACETAM 1000 MG: 500 TABLET, FILM COATED ORAL at 08:33

## 2025-02-16 RX ADMIN — CEFTRIAXONE SODIUM 1000 MG: 10 INJECTION, POWDER, FOR SOLUTION INTRAVENOUS at 10:39

## 2025-02-16 RX ADMIN — ACETAMINOPHEN 650 MG: 325 TABLET, FILM COATED ORAL at 21:24

## 2025-02-16 RX ADMIN — DEXAMETHASONE 3 MG: 0.5 TABLET ORAL at 17:12

## 2025-02-16 RX ADMIN — GABAPENTIN 300 MG: 300 CAPSULE ORAL at 21:24

## 2025-02-16 RX ADMIN — LEVOTHYROXINE SODIUM 50 MCG: 0.05 TABLET ORAL at 06:28

## 2025-02-16 RX ADMIN — ACETAMINOPHEN 650 MG: 325 TABLET, FILM COATED ORAL at 17:11

## 2025-02-16 RX ADMIN — CYANOCOBALAMIN TAB 500 MCG 1000 MCG: 500 TAB at 08:33

## 2025-02-16 RX ADMIN — GABAPENTIN 100 MG: 100 CAPSULE ORAL at 17:11

## 2025-02-16 RX ADMIN — APIXABAN 2.5 MG: 2.5 TABLET, FILM COATED ORAL at 08:33

## 2025-02-16 RX ADMIN — TRIMETHOBENZAMIDE HYDROCHLORIDE 200 MG: 100 INJECTION INTRAMUSCULAR at 08:47

## 2025-02-16 RX ADMIN — DEXAMETHASONE 3 MG: 0.5 TABLET ORAL at 08:33

## 2025-02-16 RX ADMIN — GABAPENTIN 100 MG: 100 CAPSULE ORAL at 08:34

## 2025-02-16 RX ADMIN — TRIMETHOBENZAMIDE HYDROCHLORIDE 200 MG: 100 INJECTION INTRAMUSCULAR at 14:45

## 2025-02-16 NOTE — ASSESSMENT & PLAN NOTE
Chronic anemia in the setting of malignancy.  Baseline hemoglobin (8-10)    Plan:  Currently stable continue to monitor CBC daily.

## 2025-02-16 NOTE — ASSESSMENT & PLAN NOTE
History of pulmonary embolism which occurred in November 2024  Takes Xarelto 20 mg daily at home    Plan:  Given DILLON on admission will hold Xarelto for now and replace with Eliquis 2.5 mg twice daily  Will have discussion with heme-onc tomorrow regarding Xarelto dosage as there was previous discussion on decreasing the dose given history of epistaxis although the patient is still within the 3-month window of PE.

## 2025-02-16 NOTE — ASSESSMENT & PLAN NOTE
Assessment:  Patient met severe sepsis criteria (tachycardia, fever 101 F at home, lactic acidosis 2.3) with unclear source.  Her last chemotherapy was on 2/13 with carboplatin, pemetrexed, and biologic Avastin  Treatments occurring regularly q3 weeks, often experiencing fevers, fatigue, and mild cough after treatment   Negative flu/COVID/RSV.  Negative strep pneumonia and Legionella.  CXR with no acute pathology.  Negative flu/COVID/RSV, strep pneumo and legionella urinary antigen negative  [place] UA.  She was given 30 cc/kg per sepsis protocol.  Her procalcitonin is trending down.  2/16-patient was feeling nauseated with chills this morning.  At this point, her symptoms are likely chemotherapy-induced given no clear source of infection.    Plan:  Will continue IV ceftriaxone for now. Day #3  Trend fever curve/CBC daily.  Tylenol as needed for mild pain.  Tigan and Zofran for nausea.  Monitor vital signs closely.

## 2025-02-16 NOTE — ASSESSMENT & PLAN NOTE
Continue home Protonix 40 mg daily   Valtrex Pregnancy And Lactation Text: this medication is Pregnancy Category B and is considered safe during pregnancy. This medication is not directly found in breast milk but it's metabolite acyclovir is present.

## 2025-02-16 NOTE — PLAN OF CARE
Problem: PAIN - ADULT  Goal: Verbalizes/displays adequate comfort level or baseline comfort level  Description: Interventions:  - Encourage patient to monitor pain and request assistance  - Assess pain using appropriate pain scale  - Administer analgesics based on type and severity of pain and evaluate response  - Implement non-pharmacological measures as appropriate and evaluate response  - Consider cultural and social influences on pain and pain management  - Notify physician/advanced practitioner if interventions unsuccessful or patient reports new pain  2/16/2025 0021 by Dania Rankin RN  Outcome: Progressing  2/16/2025 0021 by Dania Rankin RN  Outcome: Progressing     Problem: INFECTION - ADULT  Goal: Absence or prevention of progression during hospitalization  Description: INTERVENTIONS:  - Assess and monitor for signs and symptoms of infection  - Monitor lab/diagnostic results  - Monitor all insertion sites, i.e. indwelling lines, tubes, and drains  - Monitor endotracheal if appropriate and nasal secretions for changes in amount and color  - [place] appropriate cooling/warming therapies per order  - Administer medications as ordered  - Instruct and encourage patient and family to use good hand hygiene technique  - Identify and instruct in appropriate isolation precautions for identified infection/condition  2/16/2025 0021 by Dania Rankin RN  Outcome: Progressing  2/16/2025 0021 by Dania Rankin RN  Outcome: Progressing     Problem: SAFETY ADULT  Goal: Patient will remain free of falls  Description: INTERVENTIONS:  - Educate patient/family on patient safety including physical limitations  - Instruct patient to call for assistance with activity   - Consult OT/PT to assist with strengthening/mobility   - Keep Call bell within reach  - Keep bed low and locked with side rails adjusted as appropriate  - Keep care items and personal belongings within reach  - Initiate and maintain comfort rounds  - Make  Fall Risk Sign visible to staff  - Offer Toileting every  Hours, in advance of need  - Initiate/Maintain alarm  - Obtain necessary fall risk management equipment:   - Apply yellow socks and bracelet for high fall risk patients  - Consider moving patient to room near nurses station  Outcome: Adequate for Discharge

## 2025-02-16 NOTE — PLAN OF CARE
Problem: PAIN - ADULT  Goal: Verbalizes/displays adequate comfort level or baseline comfort level  Description: Interventions:  - Encourage patient to monitor pain and request assistance  - Assess pain using appropriate pain scale  - Administer analgesics based on type and severity of pain and evaluate response  - Implement non-pharmacological measures as appropriate and evaluate response  - Consider cultural and social influences on pain and pain management  - Notify physician/advanced practitioner if interventions unsuccessful or patient reports new pain  Outcome: Progressing     Problem: INFECTION - ADULT  Goal: Absence or prevention of progression during hospitalization  Description: INTERVENTIONS:  - Assess and monitor for signs and symptoms of infection  - Monitor lab/diagnostic results  - Monitor all insertion sites, i.e. indwelling lines, tubes, and drains  - Monitor endotracheal if appropriate and nasal secretions for changes in amount and color  - [place] appropriate cooling/warming therapies per order  - Administer medications as ordered  - Instruct and encourage patient and family to use good hand hygiene technique  - Identify and instruct in appropriate isolation precautions for identified infection/condition  Outcome: Progressing     Problem: SAFETY ADULT  Goal: Patient will remain free of falls  Description: INTERVENTIONS:  - Educate patient/family on patient safety including physical limitations  - Instruct patient to call for assistance with activity   - Consult OT/PT to assist with strengthening/mobility   - Keep Call bell within reach  - Keep bed low and locked with side rails adjusted as appropriate  - Keep care items and personal belongings within reach  - Initiate and maintain comfort rounds  - Make Fall Risk Sign visible to staff  - Offer Toileting every  Hours, in advance of need  - Initiate/Maintain alarm  - Obtain necessary fall risk management equipment:   - Apply yellow socks and  bracelet for high fall risk patients  - Consider moving patient to room near nurses station  Outcome: Progressing  Goal: Maintain or return to baseline ADL function  Description: INTERVENTIONS:  -  Assess patient's ability to carry out ADLs; assess patient's baseline for ADL function and identify physical deficits which impact ability to perform ADLs (bathing, care of mouth/teeth, toileting, grooming, dressing, etc.)  - Assess/evaluate cause of self-care deficits   - Assess range of motion  - Assess patient's mobility; develop plan if impaired  - Assess patient's need for assistive devices and provide as appropriate  - Encourage maximum independence but intervene and supervise when necessary  - Involve family in performance of ADLs  - Assess for home care needs following discharge   - Consider OT consult to assist with ADL evaluation and planning for discharge  - Provide patient education as appropriate  Outcome: Progressing  Goal: Maintains/Returns to pre admission functional level  Description: INTERVENTIONS:  - Perform AM-PAC 6 Click Basic Mobility/ Daily Activity assessment daily.  - Set and communicate daily mobility goal to care team and patient/family/caregiver.   - Collaborate with rehabilitation services on mobility goals if consulted  - Perform Range of Motion  times a day.  - Reposition patient every  hours.  - Dangle patient  times a day  - Stand patient  times a day  - Ambulate patient  times a day  - Out of bed to chair  times a day   - Out of bed for meals  times a day  - Out of bed for toileting  - Record patient progress and toleration of activity level   Outcome: Progressing     Problem: DISCHARGE PLANNING  Goal: Discharge to home or other facility with appropriate resources  Description: INTERVENTIONS:  - Identify barriers to discharge w/patient and caregiver  - Arrange for needed discharge resources and transportation as appropriate  - Identify discharge learning needs (meds, wound care, etc.)  -  Arrange for interpretive services to assist at discharge as needed  - Refer to Case Management Department for coordinating discharge planning if the patient needs post-hospital services based on physician/advanced practitioner order or complex needs related to functional status, cognitive ability, or social support system  Outcome: Progressing     Problem: Knowledge Deficit  Goal: Patient/family/caregiver demonstrates understanding of disease process, treatment plan, medications, and discharge instructions  Description: Complete learning assessment and assess knowledge base.  Interventions:  - Provide teaching at level of understanding  - Provide teaching via preferred learning methods  Outcome: Progressing     Problem: Prexisting or High Potential for Compromised Skin Integrity  Goal: Skin integrity is maintained or improved  Description: INTERVENTIONS:  - Identify patients at risk for skin breakdown  - Assess and monitor skin integrity  - Assess and monitor nutrition and hydration status  - Monitor labs   - Assess for incontinence   - Turn and reposition patient  - Assist with mobility/ambulation  - Relieve pressure over bony prominences  - Avoid friction and shearing  - Provide appropriate hygiene as needed including keeping skin clean and dry  - Evaluate need for skin moisturizer/barrier cream  - Collaborate with interdisciplinary team   - Patient/family teaching  - Consider wound care consult   Outcome: Progressing     Problem: RESPIRATORY - ADULT  Goal: Achieves optimal ventilation and oxygenation  Description: INTERVENTIONS:  - Assess for changes in respiratory status  - Assess for changes in mentation and behavior  - Position to facilitate oxygenation and minimize respiratory effort  - Oxygen administered by appropriate delivery if ordered  - Initiate smoking cessation education as indicated  - Encourage broncho-pulmonary hygiene including cough, deep breathe, Incentive Spirometry  - Assess the need for  suctioning and aspirate as needed  - Assess and instruct to report SOB or any respiratory difficulty  - Respiratory Therapy support as indicated  Outcome: Progressing     Problem: GASTROINTESTINAL - ADULT  Goal: Minimal or absence of nausea and/or vomiting  Description: INTERVENTIONS:  - Administer IV fluids if ordered to ensure adequate hydration  - Maintain NPO status until nausea and vomiting are resolved  - Nasogastric tube if ordered  - Administer ordered antiemetic medications as needed  - Provide nonpharmacologic comfort measures as appropriate  - Advance diet as tolerated, if ordered  - Consider nutrition services referral to assist patient with adequate nutrition and appropriate food choices  Outcome: Progressing  Goal: Maintains or returns to baseline bowel function  Description: INTERVENTIONS:  - Assess bowel function  - Encourage oral fluids to ensure adequate hydration  - Administer IV fluids if ordered to ensure adequate hydration  - Administer ordered medications as needed  - Encourage mobilization and activity  - Consider nutritional services referral to assist patient with adequate nutrition and appropriate food choices  Outcome: Progressing  Goal: Maintains adequate nutritional intake  Description: INTERVENTIONS:  - Monitor percentage of each meal consumed  - Identify factors contributing to decreased intake, treat as appropriate  - Assist with meals as needed  - Monitor I&O, weight, and lab values if indicated  - Obtain nutrition services referral as needed  Outcome: Progressing  Goal: Oral mucous membranes remain intact  Description: INTERVENTIONS  - Assess oral mucosa and hygiene practices  - Implement preventative oral hygiene regimen  - Implement oral medicated treatments as ordered  - Initiate Nutrition services referral as needed  Outcome: Progressing     Problem: SKIN/TISSUE INTEGRITY - ADULT  Goal: Skin Integrity remains intact(Skin Breakdown Prevention)  Description: Assess:  -Perform  Benjamín assessment every   -Clean and moisturize skin every   -Inspect skin when repositioning, toileting, and assisting with ADLS  -Assess under medical devices such as  every   -Assess extremities for adequate circulation and sensation     Bed Management:  -Have minimal linens on bed & keep smooth, unwrinkled  -Change linens as needed when moist or perspiring  -Avoid sitting or lying in one position for more than  hours while in bed  -Keep HOB at degrees     Toileting:  -Offer bedside commode  -Assess for incontinence every   -Use incontinent care products after each incontinent episode such as     Activity:  -Mobilize patient  times a day  -Encourage activity and walks on unit  -Encourage or provide ROM exercises   -Turn and reposition patient every  Hours  -Use appropriate equipment to lift or move patient in bed  -Instruct/ Assist with weight shifting every  when out of bed in chair  -Consider limitation of chair time  hour intervals    Skin Care:  -Avoid use of baby powder, tape, friction and shearing, hot water or constrictive clothing  -Relieve pressure over bony prominences using   -Do not massage red bony areas    Next Steps:  -Teach patient strategies to minimize risks such as    -Consider consults to  interdisciplinary teams such as   Outcome: Progressing  Goal: Incision(s), wounds(s) or drain site(s) healing without S/S of infection  Description: INTERVENTIONS  - Assess and document dressing, incision, wound bed, drain sites and surrounding tissue  - Provide patient and family education  - Perform skin care/dressing changes every   Outcome: Progressing  Goal: Pressure injury heals and does not worsen  Description: Interventions:  - Implement low air loss mattress or specialty surface (Criteria met)  - Apply silicone foam dressing  - Instruct/assist with weight shifting every  minutes when in chair   - Limit chair time to  hour intervals  - Use special pressure reducing interventions such as  when in  chair   - Apply fecal or urinary incontinence containment device   - Perform passive or active ROM every   - Turn and reposition patient & offload bony prominences every  hours   - Utilize friction reducing device or surface for transfers   - Consider consults to  interdisciplinary teams such as   - Use incontinent care products after each incontinent episode such as   - Consider nutrition services referral as needed  Outcome: Progressing     Problem: Potential for Falls  Goal: Patient will remain free of falls  Description: INTERVENTIONS:  - Educate patient/family on patient safety including physical limitations  - Instruct patient to call for assistance with activity   - Consult OT/PT to assist with strengthening/mobility   - Keep Call bell within reach  - Keep bed low and locked with side rails adjusted as appropriate  - Keep care items and personal belongings within reach  - Initiate and maintain comfort rounds  - Make Fall Risk Sign visible to staff  - Offer Toileting every  Hours, in advance of need  - Initiate/Maintain alarm  - Obtain necessary fall risk management equipment:  - Apply yellow socks and bracelet for high fall risk patients  - Consider moving patient to room near nurses station  Outcome: Progressing

## 2025-02-16 NOTE — ASSESSMENT & PLAN NOTE
Patient was tachypneic and desatting to 85% this morning.  She was placed on 2 L.  Lung exam was clear.  CXR on admission with no acute pathology.  Likely anxiety with low suspicion for PE or pneumonia.  Wean off oxygen as tolerated.  If continues to require oxygen, can consider repeating CXR.

## 2025-02-16 NOTE — PROGRESS NOTES
Progress Note - Hospitalist   Name: Ayla Nye 74 y.o. female I MRN: [id number]  Unit/Bed#: S -01 I Date of Admission: 2/14/2025   Date of Service: 2/16/2025 I Hospital Day: 2    Assessment & Plan  Severe sepsis (HCC)  Assessment:  Patient met severe sepsis criteria (tachycardia, fever 101 F at home, lactic acidosis 2.3) with unclear source.  Her last chemotherapy was on 2/13 with carboplatin, pemetrexed, and biologic Avastin  Treatments occurring regularly q3 weeks, often experiencing fevers, fatigue, and mild cough after treatment   Negative flu/COVID/RSV.  Negative strep pneumonia and Legionella.  CXR with no acute pathology.  Negative flu/COVID/RSV, strep pneumo and legionella urinary antigen negative  [place] UA.  She was given 30 cc/kg per sepsis protocol.  Her procalcitonin is trending down.  2/16-patient was feeling nauseated with chills this morning.  At this point, her symptoms are likely chemotherapy-induced given no clear source of infection.    Plan:  Will continue IV ceftriaxone for now. Day #3  Trend fever curve/CBC daily.  Tylenol as needed for mild pain.  Tigan and Zofran for nausea.  Monitor vital signs closely.  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 Last treatment on 2/13, planned to continue q3 weeks  Most recent CT head without contrast 2/3/2025: Evidence of parietal and occipital vasogenic edema without significant change from prior 12/27/2024  Currently on steroid taper with Decadron which started on 2/4, and is anticipated to end on Tuesday 2/25. Previous admission was complicated by symptoms of AMS with discontinuation of steroids  Takes Keppra 1000 mg BID for seizure prophylaxis    Plan:  Continue steroid taper with Decadron and monitor for AMS  Continue Keppra  1000 mg twice daily    Acute respiratory failure with hypoxia (HCC)  Patient was tachypneic and desatting to 85% this morning.  She was placed on 2 L.  Lung exam was clear.  CXR on admission with no acute pathology.  Likely anxiety with low suspicion for PE or pneumonia.  Wean off oxygen as tolerated.  If continues to require oxygen, can consider repeating CXR.  History of Pneumocystis jirovecii pneumonia  Continue home Bactrim on MWF  Hypertension  Blood pressure was elevated this morning.  Restart home amlodipine 5 mg daily.  GERD (gastroesophageal reflux disease)  Continue home Protonix 40 mg daily  Hypothyroidism  Continue home levothyroxine 50 mcg daily  History of pulmonary embolus (PE)  History of pulmonary embolism which occurred in November 2024  Takes Xarelto 20 mg daily at home    Plan:  Given DILLON on admission will hold Xarelto for now and replace with Eliquis 2.5 mg twice daily  Will have discussion with heme-onc tomorrow regarding Xarelto dosage as there was previous discussion on decreasing the dose given history of epistaxis although the patient is still within the 3-month window of PE.  Anemia  Chronic anemia in the setting of malignancy.  Baseline hemoglobin (8-10)    Plan:  Currently stable continue to monitor CBC daily.  DILLON (acute kidney injury) (HCC) (Resolved: 2/16/2025)  KDIGO guidelines define DILLON as an increase in Creatinine of 0.3 mg/dL over a 48 hour period, or an increase in Creatinine (over 7 days) of greater than 1.5 times the baseline.     Recent Labs     02/14/25  0800 02/15/25  0431 02/16/25  0416   CREATININE 1.41* 1.17 0.95   EGFR 36 46 59     Estimated Creatinine Clearance: 41.1 mL/min (by C-G formula based on SCr of 0.95 mg/dL).    Baseline 0.8-1.0  Could be due to recent administration of pemetrexed yesterday for chemotherapy  Received maintenance fluids with IV NS at 75 cc/hr    Plan:  Resolved.  I's and O's  AM BMP.  Encourage oral intake.      VTE Pharmacologic Prophylaxis:  VTE Score: 9 High Risk (Score >/= 5) - Pharmacological DVT Prophylaxis Ordered: apixaban (Eliquis). Sequential Compression Devices Ordered.    Mobility:   Basic Mobility Inpatient Raw Score: 17  JH-HLM Goal: 5: Stand one or more mins  JH-HLM Achieved: 6: Walk 10 steps or more  JH-HLM Goal NOT achieved. Continue with multidisciplinary rounding and encourage appropriate mobility to improve upon JH-HLM goals.    Patient Centered Rounds: I performed bedside rounds with nursing staff today.   Discussions with Specialists or Other Care Team Provider: Nursing    Education and Discussions with Family / Patient: Updated  (daughter) at bedside.  I updated both daughters at bedside.    Current Length of Stay: 2 day(s)  Current Patient Status: Inpatient   Discharge Plan: Anticipate discharge tomorrow to home.    Code Status: Level 1 - Full Code    Subjective   Patient was seen and examined this morning with her 2 daughters at bedside.  She complained of nausea but with no response to Zofran.  She also was having chills.  Complained of dry cough.  No abdominal pain or vomiting.  No chest pain or palpitations.  However, was feeling mildly tachypneic and placed on 2 L.  Stated that the symptoms are similar to her symptoms after each chemotherapy transfusion.    Objective :  Temp:  [98.1 °F (36.7 °C)-98.3 °F (36.8 °C)] 98.3 °F (36.8 °C)  HR:  [] 100  BP: (131-169)/(76-89) 169/86  Resp:  [14-20] 17  SpO2:  [89 %-94 %] 94 %  O2 Device: None (Room air)    Body mass index is 23.43 kg/m².     Input and Output Summary (last 24 hours):     Intake/Output Summary (Last 24 hours) at 2/16/2025 1102  Last data filed at 2/15/2025 2202  Gross per 24 hour   Intake 100 ml   Output --   Net 100 ml       Physical Exam  Vitals and nursing note reviewed.   Constitutional:       General: She is not in acute distress.     Appearance: She is well-developed.      Interventions: Nasal cannula in place.      Comments: 2 Liters   HENT:       Head: Normocephalic and atraumatic.   Eyes:      Conjunctiva/sclera: Conjunctivae normal.   Cardiovascular:      Rate and Rhythm: Regular rhythm. Tachycardia present.      Heart sounds: No murmur heard.  Pulmonary:      Effort: Pulmonary effort is normal. No respiratory distress.      Breath sounds: Normal breath sounds.   Abdominal:      General: Bowel sounds are normal.      Palpations: Abdomen is soft.      Tenderness: There is no abdominal tenderness.   Musculoskeletal:         General: No swelling.      Cervical back: Neck supple.      Right lower leg: No edema.      Left lower leg: No edema.   Skin:     General: Skin is warm and dry.      Capillary Refill: Capillary refill takes less than 2 seconds.   Neurological:      Mental Status: She is alert and oriented to person, place, and time.   Psychiatric:         Mood and Affect: Mood normal.         Lab Results: I have reviewed the following results:   Results from last 7 days   Lab Units 02/16/25  0416 02/15/25  0431 02/14/25  0800   WBC Thousand/uL 8.52   < > 11.09*   HEMOGLOBIN g/dL 8.2*   < > 9.7*   HEMATOCRIT % 26.9*   < > 31.6*   PLATELETS Thousands/uL 140*   < > 295   BANDS PCT %  --   --  4   SEGS PCT % 96*   < >  --    LYMPHO PCT % 2*   < > 3*   MONO PCT % 1*   < > 5   EOS PCT % 0   < > 0    < > = values in this interval not displayed.     Results from last 7 days   Lab Units 02/16/25  0416 02/15/25  0431 02/14/25  0800   SODIUM mmol/L 138   < > 139   POTASSIUM mmol/L 4.3   < > 4.2   CHLORIDE mmol/L 109*   < > 106   CO2 mmol/L 20*   < > 23   BUN mg/dL 24   < > 28*   CREATININE mg/dL 0.95   < > 1.41*   ANION GAP mmol/L 9   < > 10   CALCIUM mg/dL 8.7   < > 8.7   ALBUMIN g/dL  --   --  3.7   TOTAL BILIRUBIN mg/dL  --   --  0.40   ALK PHOS U/L  --   --  51   ALT U/L  --   --  14   AST U/L  --   --  19   GLUCOSE RANDOM mg/dL 84   < > 113    < > = values in this interval not displayed.     Results from last 7 days   Lab Units 02/14/25  0800   INR  1.25*              Results from last 7 days   Lab Units 02/16/25  0416 02/15/25  0431 02/14/25  1122 02/14/25  0800   LACTIC ACID mmol/L  --  1.2 1.7 2.3*   PROCALCITONIN ng/ml 12.10* 23.18*  --  45.70*       Recent Cultures (last 7 days):   Results from last 7 days   Lab Units 02/14/25  1622 02/14/25  0800   BLOOD CULTURE   --  No Growth at 24 hrs.  No Growth at 24 hrs.   LEGIONELLA URINARY ANTIGEN  Negative  --        Imaging Results Review: No pertinent imaging studies reviewed.  Other Study Results Review: No additional pertinent studies reviewed.    Last 24 Hours Medication List:     Current Facility-Administered Medications:     acetaminophen (TYLENOL) tablet 650 mg, Q6H PRN    amLODIPine (NORVASC) tablet 5 mg, Daily    apixaban (ELIQUIS) tablet 2.5 mg, BID    ceftriaxone (ROCEPHIN) 1 g/50 mL in dextrose IVPB, Q24H, Last Rate: 1,000 mg (02/16/25 1039)    cyanocobalamin (VITAMIN B-12) tablet 1,000 mcg, Daily    dexamethasone (DECADRON) tablet 3 mg, BID With Meals **FOLLOWED BY** [START ON 2/18/2025] dexamethasone (DECADRON) tablet 2 mg, BID With Meals    folic acid (FOLVITE) tablet 1 mg, Daily    gabapentin (NEURONTIN) capsule 100 mg, BID    gabapentin (NEURONTIN) capsule 300 mg, HS    levETIRAcetam (KEPPRA) tablet 1,000 mg, BID    levothyroxine tablet 50 mcg, Early Morning    ondansetron (ZOFRAN) injection 4 mg, Q4H PRN    pantoprazole (PROTONIX) EC tablet 40 mg, Daily Before Breakfast    senna (SENOKOT) tablet 8.6 mg, Daily    sodium chloride (OCEAN) 0.65 % nasal spray 1 spray, Q1H PRN    sulfamethoxazole-trimethoprim (BACTRIM DS) 800-160 mg per tablet 1 tablet, Once per day on Monday Wednesday Friday    trimethobenzamide (TIGAN) IM injection 200 mg, Q6H PRN    Administrative Statements   Today, Patient Was Seen By: Fernanda Valentine MD  I have spent a total time of 35 minutes in caring for this patient on the day of the visit/encounter including Diagnostic results, Prognosis, and Risks and benefits of tx  options.    **Please Note: This note may have been constructed using a voice recognition system.**

## 2025-02-16 NOTE — ASSESSMENT & PLAN NOTE
[employer] guidelines define DILLON as an increase in Creatinine of 0.3 mg/dL over a 48 hour period, or an increase in Creatinine (over 7 days) of greater than 1.5 times the baseline.     Recent Labs     02/14/25  0800 02/15/25  0431 02/16/25  0416   CREATININE 1.41* 1.17 0.95   EGFR 36 46 59     Estimated Creatinine Clearance: 41.1 mL/min (by C-G formula based on SCr of 0.95 mg/dL).    Baseline 0.8-1.0  Could be due to recent administration of pemetrexed yesterday for chemotherapy  Received maintenance fluids with IV NS at 75 cc/hr    Plan:  Resolved.  I's and O's  AM BMP.  Encourage oral intake.

## 2025-02-16 NOTE — ASSESSMENT & PLAN NOTE
KDIGO guidelines define DILLON as an increase in Creatinine of 0.3 mg/dL over a 48 hour period, or an increase in Creatinine (over 7 days) of greater than 1.5 times the baseline.     Recent Labs     02/14/25  0800 02/15/25  0431 02/16/25  0416   CREATININE 1.41* 1.17 0.95   EGFR 36 46 59     Estimated Creatinine Clearance: 41.1 mL/min (by C-G formula based on SCr of 0.95 mg/dL).    Baseline 0.8-1.0  Could be due to recent administration of pemetrexed yesterday for chemotherapy  Initiated on maintenance fluids with IV NS at 75 cc/hr  2/14 BMP showed resolution, with an improved creatinine of 1.17    Plan:  Monitor daily BMP until creatinine returns to baseline  Encourage adequate PO solid and fluid intake  Avoid nephrotoxic agents

## 2025-02-17 LAB
ALBUMIN SERPL BCG-MCNC: 3.3 G/DL (ref 3.5–5)
ALP SERPL-CCNC: 41 U/L (ref 34–104)
ALT SERPL W P-5'-P-CCNC: 11 U/L (ref 7–52)
ANION GAP SERPL CALCULATED.3IONS-SCNC: 8 MMOL/L (ref 4–13)
ANISOCYTOSIS BLD QL SMEAR: PRESENT
AST SERPL W P-5'-P-CCNC: 15 U/L (ref 13–39)
BASOPHILS # BLD MANUAL: 0 THOUSAND/UL (ref 0–0.1)
BASOPHILS NFR MAR MANUAL: 0 % (ref 0–1)
BILIRUB SERPL-MCNC: 0.45 MG/DL (ref 0.2–1)
BUN SERPL-MCNC: 24 MG/DL (ref 5–25)
C COLI+JEJUNI TUF STL QL NAA+PROBE: NEGATIVE
C DIFF TOX GENS STL QL NAA+PROBE: NEGATIVE
CALCIUM ALBUM COR SERPL-MCNC: 8.8 MG/DL (ref 8.3–10.1)
CALCIUM SERPL-MCNC: 8.2 MG/DL (ref 8.4–10.2)
CHLORIDE SERPL-SCNC: 106 MMOL/L (ref 96–108)
CO2 SERPL-SCNC: 21 MMOL/L (ref 21–32)
CREAT SERPL-MCNC: 0.86 MG/DL (ref 0.6–1.3)
DACRYOCYTES BLD QL SMEAR: PRESENT
EC STX1+STX2 GENES STL QL NAA+PROBE: NEGATIVE
EOSINOPHIL # BLD MANUAL: 0 THOUSAND/UL (ref 0–0.4)
EOSINOPHIL NFR BLD MANUAL: 0 % (ref 0–6)
ERYTHROCYTE [DISTWIDTH] IN BLOOD BY AUTOMATED COUNT: 15.9 % (ref 11.6–15.1)
GFR SERPL CREATININE-BSD FRML MDRD: 66 ML/MIN/1.73SQ M
GIANT PLATELETS BLD QL SMEAR: PRESENT
GLUCOSE SERPL-MCNC: 103 MG/DL (ref 65–140)
HCT VFR BLD AUTO: 26.4 % (ref 34.8–46.1)
HGB BLD-MCNC: 8.4 G/DL (ref 11.5–15.4)
LYMPHOCYTES # BLD AUTO: 0.08 THOUSAND/UL (ref 0.6–4.47)
LYMPHOCYTES # BLD AUTO: 3 % (ref 14–44)
MAGNESIUM SERPL-MCNC: 1.8 MG/DL (ref 1.9–2.7)
MCH RBC QN AUTO: 29.5 PG (ref 26.8–34.3)
MCHC RBC AUTO-ENTMCNC: 31.8 G/DL (ref 31.4–37.4)
MCV RBC AUTO: 93 FL (ref 82–98)
MONOCYTES # BLD AUTO: 0.03 THOUSAND/UL (ref 0–1.22)
MONOCYTES NFR BLD: 1 % (ref 4–12)
NEUTROPHILS # BLD MANUAL: 2.45 THOUSAND/UL (ref 1.85–7.62)
NEUTS BAND NFR BLD MANUAL: 4 % (ref 0–8)
NEUTS SEG NFR BLD AUTO: 92 % (ref 43–75)
PLATELET # BLD AUTO: 140 THOUSANDS/UL (ref 149–390)
PLATELET BLD QL SMEAR: ADEQUATE
PMV BLD AUTO: 10.7 FL (ref 8.9–12.7)
POIKILOCYTOSIS BLD QL SMEAR: PRESENT
POLYCHROMASIA BLD QL SMEAR: PRESENT
POTASSIUM SERPL-SCNC: 4.3 MMOL/L (ref 3.5–5.3)
PROT SERPL-MCNC: 5.8 G/DL (ref 6.4–8.4)
RBC # BLD AUTO: 2.85 MILLION/UL (ref 3.81–5.12)
RBC MORPH BLD: PRESENT
SALMONELLA SP SPAO STL QL NAA+PROBE: NEGATIVE
SHIGELLA SP+EIEC IPAH STL QL NAA+PROBE: NEGATIVE
SODIUM SERPL-SCNC: 135 MMOL/L (ref 135–147)
STOMATOCYTES BLD QL SMEAR: PRESENT
WBC # BLD AUTO: 2.55 THOUSAND/UL (ref 4.31–10.16)

## 2025-02-17 PROCEDURE — 85027 COMPLETE CBC AUTOMATED: CPT | Performed by: INTERNAL MEDICINE

## 2025-02-17 PROCEDURE — 80053 COMPREHEN METABOLIC PANEL: CPT | Performed by: INTERNAL MEDICINE

## 2025-02-17 PROCEDURE — 85007 BL SMEAR W/DIFF WBC COUNT: CPT | Performed by: INTERNAL MEDICINE

## 2025-02-17 PROCEDURE — 83735 ASSAY OF MAGNESIUM: CPT | Performed by: INTERNAL MEDICINE

## 2025-02-17 PROCEDURE — 99232 SBSQ HOSP IP/OBS MODERATE 35: CPT | Performed by: INTERNAL MEDICINE

## 2025-02-17 RX ORDER — ACETAMINOPHEN 325 MG/1
975 TABLET ORAL 3 TIMES DAILY
Status: DISCONTINUED | OUTPATIENT
Start: 2025-02-17 | End: 2025-02-19 | Stop reason: HOSPADM

## 2025-02-17 RX ORDER — ACETAMINOPHEN 325 MG/1
975 TABLET ORAL EVERY 6 HOURS PRN
Status: DISCONTINUED | OUTPATIENT
Start: 2025-02-17 | End: 2025-02-17

## 2025-02-17 RX ORDER — MAGNESIUM SULFATE HEPTAHYDRATE 40 MG/ML
2 INJECTION, SOLUTION INTRAVENOUS ONCE
Status: COMPLETED | OUTPATIENT
Start: 2025-02-17 | End: 2025-02-17

## 2025-02-17 RX ADMIN — APIXABAN 2.5 MG: 2.5 TABLET, FILM COATED ORAL at 09:04

## 2025-02-17 RX ADMIN — AMLODIPINE BESYLATE 5 MG: 5 TABLET ORAL at 09:05

## 2025-02-17 RX ADMIN — CEFTRIAXONE SODIUM 1000 MG: 10 INJECTION, POWDER, FOR SOLUTION INTRAVENOUS at 10:49

## 2025-02-17 RX ADMIN — ACETAMINOPHEN 975 MG: 325 TABLET, FILM COATED ORAL at 17:54

## 2025-02-17 RX ADMIN — GABAPENTIN 100 MG: 100 CAPSULE ORAL at 09:04

## 2025-02-17 RX ADMIN — MAGNESIUM SULFATE HEPTAHYDRATE 2 G: 40 INJECTION, SOLUTION INTRAVENOUS at 07:38

## 2025-02-17 RX ADMIN — ACETAMINOPHEN 650 MG: 325 TABLET, FILM COATED ORAL at 09:52

## 2025-02-17 RX ADMIN — CYANOCOBALAMIN TAB 500 MCG 1000 MCG: 500 TAB at 09:05

## 2025-02-17 RX ADMIN — FOLIC ACID 1 MG: 1 TABLET ORAL at 09:05

## 2025-02-17 RX ADMIN — LEVETIRACETAM 1000 MG: 500 TABLET, FILM COATED ORAL at 17:54

## 2025-02-17 RX ADMIN — ONDANSETRON 4 MG: 2 INJECTION INTRAMUSCULAR; INTRAVENOUS at 21:56

## 2025-02-17 RX ADMIN — SULFAMETHOXAZOLE AND TRIMETHOPRIM 1 TABLET: 800; 160 TABLET ORAL at 09:05

## 2025-02-17 RX ADMIN — DEXAMETHASONE 3 MG: 0.5 TABLET ORAL at 15:51

## 2025-02-17 RX ADMIN — ACETAMINOPHEN 650 MG: 325 TABLET, FILM COATED ORAL at 03:36

## 2025-02-17 RX ADMIN — GABAPENTIN 100 MG: 100 CAPSULE ORAL at 17:55

## 2025-02-17 RX ADMIN — APIXABAN 2.5 MG: 2.5 TABLET, FILM COATED ORAL at 17:55

## 2025-02-17 RX ADMIN — LEVOTHYROXINE SODIUM 50 MCG: 0.05 TABLET ORAL at 06:07

## 2025-02-17 RX ADMIN — LEVETIRACETAM 1000 MG: 500 TABLET, FILM COATED ORAL at 09:05

## 2025-02-17 RX ADMIN — GABAPENTIN 300 MG: 300 CAPSULE ORAL at 21:56

## 2025-02-17 RX ADMIN — ONDANSETRON 4 MG: 2 INJECTION INTRAMUSCULAR; INTRAVENOUS at 15:51

## 2025-02-17 RX ADMIN — PANTOPRAZOLE SODIUM 40 MG: 40 TABLET, DELAYED RELEASE ORAL at 06:07

## 2025-02-17 RX ADMIN — DEXAMETHASONE 3 MG: 0.5 TABLET ORAL at 07:38

## 2025-02-17 NOTE — ASSESSMENT & PLAN NOTE
History of pulmonary embolism which occurred in November 2024  Takes Xarelto 20 mg daily at home    Plan:  Given DILLON on admission will hold Xarelto for now and replace with Eliquis 2.5 mg twice daily  Will have discussion with heme-onc regarding Xarelto dosage as there was previous discussion on decreasing the dose given history of epistaxis although the patient is still within the 3-month window of PE.

## 2025-02-17 NOTE — PLAN OF CARE
Problem: PAIN - ADULT  Goal: Verbalizes/displays adequate comfort level or baseline comfort level  Description: Interventions:  - Encourage patient to monitor pain and request assistance  - Assess pain using appropriate pain scale  - Administer analgesics based on type and severity of pain and evaluate response  - Implement non-pharmacological measures as appropriate and evaluate response  - Consider cultural and social influences on pain and pain management  - Notify physician/advanced practitioner if interventions unsuccessful or patient reports new pain  Outcome: Progressing     Problem: INFECTION - ADULT  Goal: Absence or prevention of progression during hospitalization  Description: INTERVENTIONS:  - Assess and monitor for signs and symptoms of infection  - Monitor lab/diagnostic results  - Monitor all insertion sites, i.e. indwelling lines, tubes, and drains  - Monitor endotracheal if appropriate and nasal secretions for changes in amount and color  - [place] appropriate cooling/warming therapies per order  - Administer medications as ordered  - Instruct and encourage patient and family to use good hand hygiene technique  - Identify and instruct in appropriate isolation precautions for identified infection/condition  Outcome: Progressing     Problem: SAFETY ADULT  Goal: Patient will remain free of falls  Description: INTERVENTIONS:  - Educate patient/family on patient safety including physical limitations  - Instruct patient to call for assistance with activity   - Consult OT/PT to assist with strengthening/mobility   - Keep Call bell within reach  - Keep bed low and locked with side rails adjusted as appropriate  - Keep care items and personal belongings within reach  - Initiate and maintain comfort rounds  - Make Fall Risk Sign visible to staff  - Apply yellow socks and bracelet for high fall risk patients  - Consider moving patient to room near nurses station  Outcome: Progressing  Goal: Maintain or  return to baseline ADL function  Description: INTERVENTIONS:  -  Assess patient's ability to carry out ADLs; assess patient's baseline for ADL function and identify physical deficits which impact ability to perform ADLs (bathing, care of mouth/teeth, toileting, grooming, dressing, etc.)  - Assess/evaluate cause of self-care deficits   - Assess range of motion  - Assess patient's mobility; develop plan if impaired  - Assess patient's need for assistive devices and provide as appropriate  - Encourage maximum independence but intervene and supervise when necessary  - Involve family in performance of ADLs  - Assess for home care needs following discharge   - Consider OT consult to assist with ADL evaluation and planning for discharge  - Provide patient education as appropriate  Outcome: Progressing  Goal: Maintains/Returns to pre admission functional level  Description: INTERVENTIONS:  - Perform AM-PAC 6 Click Basic Mobility/ Daily Activity assessment daily.  - Set and communicate daily mobility goal to care team and patient/family/caregiver.   - Collaborate with rehabilitation services on mobility goals if consulted  - Out of bed for toileting  - Record patient progress and toleration of activity level   Outcome: Progressing     Problem: Prexisting or High Potential for Compromised Skin Integrity  Goal: Skin integrity is maintained or improved  Description: INTERVENTIONS:  - Identify patients at risk for skin breakdown  - Assess and monitor skin integrity  - Assess and monitor nutrition and hydration status  - Monitor labs   - Assess for incontinence   - Turn and reposition patient  - Assist with mobility/ambulation  - Relieve pressure over bony prominences  - Avoid friction and shearing  - Provide appropriate hygiene as needed including keeping skin clean and dry  - Evaluate need for skin moisturizer/barrier cream  - Collaborate with interdisciplinary team   - Patient/family teaching  - Consider wound care consult    Outcome: Progressing     Problem: RESPIRATORY - ADULT  Goal: Achieves optimal ventilation and oxygenation  Description: INTERVENTIONS:  - Assess for changes in respiratory status  - Assess for changes in mentation and behavior  - Position to facilitate oxygenation and minimize respiratory effort  - Oxygen administered by appropriate delivery if ordered  - Initiate smoking cessation education as indicated  - Encourage broncho-pulmonary hygiene including cough, deep breathe, Incentive Spirometry  - Assess the need for suctioning and aspirate as needed  - Assess and instruct to report SOB or any respiratory difficulty  - Respiratory Therapy support as indicated  Outcome: Progressing     Problem: GASTROINTESTINAL - ADULT  Goal: Minimal or absence of nausea and/or vomiting  Description: INTERVENTIONS:  - Administer IV fluids if ordered to ensure adequate hydration  - Maintain NPO status until nausea and vomiting are resolved  - Nasogastric tube if ordered  - Administer ordered antiemetic medications as needed  - Provide nonpharmacologic comfort measures as appropriate  - Advance diet as tolerated, if ordered  - Consider nutrition services referral to assist patient with adequate nutrition and appropriate food choices  Outcome: Progressing  Goal: Maintains or returns to baseline bowel function  Description: INTERVENTIONS:  - Assess bowel function  - Encourage oral fluids to ensure adequate hydration  - Administer IV fluids if ordered to ensure adequate hydration  - Administer ordered medications as needed  - Encourage mobilization and activity  - Consider nutritional services referral to assist patient with adequate nutrition and appropriate food choices  Outcome: Progressing  Goal: Maintains adequate nutritional intake  Description: INTERVENTIONS:  - Monitor percentage of each meal consumed  - Identify factors contributing to decreased intake, treat as appropriate  - Assist with meals as needed  - Monitor I&O, weight,  and lab values if indicated  - Obtain nutrition services referral as needed  Outcome: Progressing  Goal: Oral mucous membranes remain intact  Description: INTERVENTIONS  - Assess oral mucosa and hygiene practices  - Implement preventative oral hygiene regimen  - Implement oral medicated treatments as ordered  - Initiate Nutrition services referral as needed  Outcome: Progressing     Problem: SKIN/TISSUE INTEGRITY - ADULT  Goal: Skin Integrity remains intact(Skin Breakdown Prevention)  Description: Assess:    Activity  Outcome: Progressing  Goal: Incision(s), wounds(s) or drain site(s) healing without S/S of infection  Description: INTERVENTIONS  - Assess and document dressing, incision, wound bed, drain sites and surrounding tissue  - Provide patient and family education  - Consider nutrition services referral as needed  Outcome: Progressing     Problem: Potential for Falls  Goal: Patient will remain free of falls  Description: INTERVENTIONS:  - Educate patient/family on patient safety including physical limitations  - Instruct patient to call for assistance with activity   - Consult OT/PT to assist with strengthening/mobility   - Keep Call bell within reach  - Keep bed low and locked with side rails adjusted as appropriate  - Keep care items and personal belongings within reach  - Initiate and maintain comfort rounds  - Make Fall Risk Sign visible to staff  - Apply yellow socks and bracelet for high fall risk patients  - Consider moving patient to room near nurses station  Outcome: Progressing

## 2025-02-17 NOTE — ASSESSMENT & PLAN NOTE
Assessment:  Patient met severe sepsis criteria (tachycardia, fever 101 F at home, lactic acidosis 2.3) with unclear source.  Her last chemotherapy was on 2/13 with carboplatin, pemetrexed, and biologic Avastin  Treatments occurring regularly q3 weeks, often experiencing fevers, fatigue, and mild cough after treatment   Negative flu/COVID/RSV.  Negative strep pneumonia and Legionella.  CXR with no acute pathology.  Negative flu/COVID/RSV, strep pneumo and legionella urinary antigen negative  [place] [facility]  She was given 30 cc/kg per sepsis protocol  Her procalcitonin is trending down  2/16-patient was feeling nauseated with chills this morning.  At this point, her symptoms are likely chemotherapy-induced given no clear source of infection  2/17 Discontinued IV ceftriaxone given    Plan:  Trend fever curve/CBC daily.  Tylenol as needed for mild pain.  Tigan and Zofran for nausea.  Monitor vital signs closely.

## 2025-02-17 NOTE — ASSESSMENT & PLAN NOTE
Patient was tachypneic and desatting to 85% on 2/16, requiring 2L O2  Lung exam was clear.  CXR on admission with no acute pathology.  Likely anxiety with low suspicion for PE or pneumonia.  2/17 Saturating at 91-94% on RA, though desaturated to 89% in the evening    Plan:  Provide supplemental O2 as needed to maintain SpO2 > 90%, wean off oxygen as tolerated.  If continues to require oxygen, can consider repeating CXR.

## 2025-02-17 NOTE — PROGRESS NOTES
Progress Note - Hospitalist   Name: Ayla Nye 74 y.o. female I MRN: [id number]  Unit/Bed#: S -01 I Date of Admission: 2/14/2025   Date of Service: 2/17/2025 I Hospital Day: 3    Assessment & Plan  Severe sepsis (HCC)  Assessment:  Patient met severe sepsis criteria (tachycardia, fever 101 F at home, lactic acidosis 2.3) with unclear source.  Her last chemotherapy was on 2/13 with carboplatin, pemetrexed, and biologic Avastin  Treatments occurring regularly q3 weeks, often experiencing fevers, fatigue, and mild cough after treatment   Negative flu/COVID/RSV.  Negative strep pneumonia and Legionella.  CXR with no acute pathology.  Negative flu/COVID/RSV, strep pneumo and legionella urinary antigen negative  [place] [facility]  She was given 30 cc/kg per sepsis protocol  Her procalcitonin is trending down  2/16-patient was feeling nauseated with chills this morning.  At this point, her symptoms are likely chemotherapy-induced given no clear source of infection  2/17 Discontinued IV ceftriaxone given    Plan:  Trend fever curve/CBC daily.  Tylenol as needed for mild pain.  Tigan and Zofran for nausea.  Monitor vital signs closely.  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 Last treatment on 2/13, planned to continue q3 weeks  Most recent CT head without contrast 2/3/2025: Evidence of parietal and occipital vasogenic edema without significant change from prior 12/27/2024  Currently on steroid taper with Decadron which started on 2/4, and is anticipated to end on Tuesday 2/25. Previous admission was complicated by symptoms of AMS with discontinuation of steroids  Takes Keppra 1000 mg BID for seizure prophylaxis    Plan:  Continue steroid taper with Decadron and monitor for AMS  Continue Keppra 1000 mg  twice daily    Acute respiratory failure with hypoxia (HCC)  Patient was tachypneic and desatting to 85% on 2/16, requiring 2L O2  Lung exam was clear.  CXR on admission with no acute pathology.  Likely anxiety with low suspicion for PE or pneumonia.  2/17 Saturating at 91-94% on RA, though desaturated to 89% in the evening    Plan:  Provide supplemental O2 as needed to maintain SpO2 > 90%, wean off oxygen as tolerated.  If continues to require oxygen, can consider repeating CXR.  History of Pneumocystis jirovecii pneumonia  Continue home Bactrim on MWF  Hypertension  Blood pressure has been elevated but stable in the 120s-150s/70s-80s    Plan:  Continue home amlodipine 5 mg daily.  GERD (gastroesophageal reflux disease)  Continue home Protonix 40 mg daily  Hypothyroidism  Continue home levothyroxine 50 mcg daily  History of pulmonary embolus (PE)  History of pulmonary embolism which occurred in November 2024  Takes Xarelto 20 mg daily at home    Plan:  Given DILLON on admission will hold Xarelto for now and replace with Eliquis 2.5 mg twice daily  Will have discussion with heme-onc regarding Xarelto dosage as there was previous discussion on decreasing the dose given history of epistaxis although the patient is still within the 3-month window of PE.  Anemia  Chronic anemia in the setting of malignancy.  Baseline hemoglobin (8-10)    Plan:  Currently stable continue to monitor CBC daily.    VTE Pharmacologic Prophylaxis: VTE Score: 9 High Risk (Score >/= 5) - Pharmacological DVT Prophylaxis Ordered: apixaban (Eliquis). Sequential Compression Devices Ordered.    Mobility:   Basic Mobility Inpatient Raw Score: 17  JH-HLM Goal: 5: Stand one or more mins  JH-HLM Achieved: 5: Stand (1 or more minutes)  JH-HLM Goal achieved. Continue to encourage appropriate mobility.    Patient Centered Rounds: I performed bedside rounds with nursing staff today.   Discussions with Specialists or Other Care Team Provider:     Education and  Discussions with Family / Patient: Updated  (daughter) via phone.    Current Length of Stay: 3 day(s)  Current Patient Status: Inpatient   Certification Statement: The patient will continue to require additional inpatient hospital stay due to optimization of electrolytes given GI symptoms and poor PO intake  Discharge Plan: Anticipate discharge tomorrow to home.    Code Status: Level 1 - Full Code    Nora Aguila was seen and evaluated at the bedside. Today, she notes that she feels about the same as yesterday. She endorses fatigue, nausea, vomiting, and continued watery diarrhea. She explained that she has been not really been able to tolerate p.o. intake since that exacerbates her nausea. We had an extensive discussion regarding the likelihood that her symptoms are secondary to chemotherapy, and not an acute infection, since workup has been negative, including C. difficile and stool enteric panel. She expressed understanding.    Objective :  Temp:  [98.9 °F (37.2 °C)-99.5 °F (37.5 °C)] 99.5 °F (37.5 °C)  HR:  [] 89  BP: (121-150)/(69-85) 150/85  Resp:  [16-20] 18  SpO2:  [89 %-94 %] 89 %  O2 Device: None (Room air)    Body mass index is 23.43 kg/m².     Input and Output Summary (last 24 hours):     Intake/Output Summary (Last 24 hours) at 2/17/2025 1701  Last data filed at 2/17/2025 1049  Gross per 24 hour   Intake 100 ml   Output 501 ml   Net -401 ml       Physical Exam  Vitals reviewed.   Constitutional:       Appearance: She is ill-appearing.   HENT:      Head: Normocephalic and atraumatic.      Mouth/Throat:      Mouth: Mucous membranes are dry.      Pharynx: Oropharynx is clear.   Eyes:      General: No scleral icterus.     Extraocular Movements: Extraocular movements intact.      Conjunctiva/sclera: Conjunctivae normal.   Cardiovascular:      Rate and Rhythm: Normal rate and regular rhythm.      Heart sounds: Normal heart sounds.   Pulmonary:      Effort: Pulmonary effort is  normal.      Breath sounds: Normal breath sounds.   Abdominal:      General: Bowel sounds are increased. There is no distension.      Palpations: Abdomen is soft.      Tenderness: There is no abdominal tenderness. There is no guarding.   Musculoskeletal:      Right lower leg: No edema.      Left lower leg: No edema.   Skin:     General: Skin is warm and dry.   Neurological:      General: No focal deficit present.      Mental Status: She is alert and oriented to person, place, and time.   Psychiatric:         Mood and Affect: Mood normal.         Behavior: Behavior normal.           Lines/Drains:              Lab Results: I have reviewed the following results:   Results from last 7 days   Lab Units 02/17/25  0100 02/16/25  0416   WBC Thousand/uL 2.55* 8.52   HEMOGLOBIN g/dL 8.4* 8.2*   HEMATOCRIT % 26.4* 26.9*   PLATELETS Thousands/uL 140* 140*   BANDS PCT % 4  --    SEGS PCT %  --  96*   LYMPHO PCT % 3* 2*   MONO PCT % 1* 1*   EOS PCT % 0 0     Results from last 7 days   Lab Units 02/17/25  0100   SODIUM mmol/L 135   POTASSIUM mmol/L 4.3   CHLORIDE mmol/L 106   CO2 mmol/L 21   BUN mg/dL 24   CREATININE mg/dL 0.86   ANION GAP mmol/L 8   CALCIUM mg/dL 8.2*   ALBUMIN g/dL 3.3*   TOTAL BILIRUBIN mg/dL 0.45   ALK PHOS U/L 41   ALT U/L 11   AST U/L 15   GLUCOSE RANDOM mg/dL 103     Results from last 7 days   Lab Units 02/14/25  0800   INR  1.25*             Results from last 7 days   Lab Units 02/16/25  0416 02/15/25  0431 02/14/25  1122 02/14/25  0800   LACTIC ACID mmol/L  --  1.2 1.7 2.3*   PROCALCITONIN ng/ml 12.10* 23.18*  --  45.70*       Recent Cultures (last 7 days):   Results from last 7 days   Lab Units 02/16/25  1300 02/14/25  1622 02/14/25  0800   BLOOD CULTURE   --   --  No Growth at 72 hrs.  No Growth at 72 hrs.   LEGIONELLA URINARY ANTIGEN   --  Negative  --    C DIFF TOXIN B BY PCR  Negative  --   --              Last 24 Hours Medication List:     Current Facility-Administered Medications:      acetaminophen (TYLENOL) tablet 975 mg, Q6H PRN    amLODIPine (NORVASC) tablet 5 mg, Daily    apixaban (ELIQUIS) tablet 2.5 mg, BID    cyanocobalamin (VITAMIN B-12) tablet 1,000 mcg, Daily    [COMPLETED] dexamethasone (DECADRON) tablet 3 mg, BID With Meals **FOLLOWED BY** [START ON 2/18/2025] dexamethasone (DECADRON) tablet 2 mg, BID With Meals    folic acid (FOLVITE) tablet 1 mg, Daily    gabapentin (NEURONTIN) capsule 100 mg, BID    gabapentin (NEURONTIN) capsule 300 mg, HS    levETIRAcetam (KEPPRA) tablet 1,000 mg, BID    levothyroxine tablet 50 mcg, Early Morning    ondansetron (ZOFRAN) injection 4 mg, Q4H PRN    pantoprazole (PROTONIX) EC tablet 40 mg, Daily Before Breakfast    senna (SENOKOT) tablet 8.6 mg, Daily    sodium chloride (OCEAN) 0.65 % nasal spray 1 spray, Q1H PRN    sulfamethoxazole-trimethoprim (BACTRIM DS) 800-160 mg per tablet 1 tablet, Once per day on Monday Wednesday Friday    trimethobenzamide (TIGAN) IM injection 200 mg, Q6H PRN    Administrative Statements   Today, Patient Was Seen By: Nimco Patel DO      **Please Note: This note may have been constructed using a voice recognition system.**

## 2025-02-17 NOTE — CASE MANAGEMENT
Case Management Assessment & Discharge Planning Note    Patient name Ayla Nye  Location S /S -01 MRN [id number]  : 1950 Date 2025       Current Admission Date: 2025  Current Admission Diagnosis:Severe sepsis (HCC)   Patient Active Problem List    Diagnosis Date Noted Date Diagnosed    Severe sepsis (HCC) 02/15/2025     GERD (gastroesophageal reflux disease) 2025     Epistaxis 2025     History of Pneumocystis jirovecii pneumonia 2025     IgG deficiency (HCC) 2025     Right kidney mass 2025     History of pulmonary embolus (PE) 2024     Dyspnea on exertion 2024     Imbalance 2024     Acute respiratory failure with hypoxia (HCC) 2024     Hyponatremia 2024     Leukocytosis 10/27/2024     Anemia 10/25/2024     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     Palliative care patient 2024     Cancer associated pain 2024     Goals of care, counseling/discussion 2024     Right hip pain 09/10/2024     Altered mental status 2024     Hypothyroidism 2024     Abnormal imaging of central nervous system 2024     Acute metabolic encephalopathy 2024     Lung cancer metastatic to brain (HCC) 2024     Brain mass 2024     Metastatic cancer to brain (HCC) 2024     Visual disturbance 2024     Dehydration 2024     Moderate protein-calorie malnutrition (HCC) 2024     Bilateral leg pain 2024     Insomnia 2024     Metastatic adenocarcinoma (HCC) 2024     Chronic kidney disease, stage 3b (HCC) 2024     Age-related osteoporosis without current pathological fracture 2023     Encounter for monitoring of hydroxyurea therapy 2023     JAK2 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 2023     Hypertension 08/10/2022     Mixed hyperlipidemia 08/10/2022     Essential thrombocytosis (HCC) 2020     TB lung, 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     Psoriatic  arthritis (HCC) 09/10/2019       LOS (days): 3  Geometric Mean LOS (GMLOS) (days): 2.7  Days to GMLOS:-0.4     OBJECTIVE:  PATIENT READMITTED TO HOSPITAL  Risk of Unplanned Readmission Score: 63.02         Current admission status: Inpatient       Preferred Pharmacy:   [id number] - [place] - [address] [facility]  [address] [facility] [place] [address]  Phone: [phone] Fax: [phone]    [facility] ([place])  [place] - [address]  [address]  [place] [address]  Phone: [phone] Fax: [phone]    Primary Care Provider: Rafy Angelo MD    Primary Insurance: MEDICARE  Secondary Insurance: AARP    ASSESSMENT:  Active Health Care Proxies       Juliana Camarena Alternate Health Care Agent - Daughter   Primary Phone: [phone] (Mobile)  Home Phone: [phone]                           Readmission Root Cause  30 Day Readmission: Yes  During your hospital stay, did someone (provider, nurse, ) explain your care to you in a way you could understand?: Yes  Did you feel medically stable to leave the hospital?: Yes  Were you able to pay for your medication at the pharmacy?: Yes  Did you have reliable transportation to take you to your appointments?: Yes  During previous admission, was a post-acute recommendation made?: No  Patient was readmitted due to: Fevers  Action Plan: [place]    Patient Information  Admitted from:: Home  Mental Status: Alert  During Assessment patient was accompanied by: Not accompanied during assessment  Assessment information provided by:: Patient  Primary Caregiver: Self  Support Systems: Self, Family members  County of Residence: [place]  What city do you live in?: [place]  Home entry access options. Select all that apply.: Stairs  Number of steps to enter home.: 6  Do the steps have railings?: Yes  Type of Current Residence: [address] [place] home  Upon entering residence, is there a bedroom  on the main floor (no further steps)?: No  A bedroom is located on the following floor levels of residence (select all that apply):: 2nd Floor  Upon entering residence, is there a bathroom on the main floor (no further steps)?: No  Indicate which floors of current residence have a bathroom (select all the apply):: 2nd Floor  Number of steps to 2nd floor from main floor: One Flight  Living Arrangements: Lives Alone  Is patient a ?: No    Activities of Daily Living Prior to Admission  Functional Status: Independent  Completes ADLs independently?: Yes  Ambulates independently?: Yes  Does patient use assisted devices?: No  Does patient currently own DME?: Yes  What DME does the patient currently own?: Bedside [employer]  Does patient have a history of Outpatient Therapy (PT/OT)?: No  Does the patient have a history of Short-Term Rehab?: No  Does patient have a history of HHC?: Yes (Possibly [facility])  Does patient currently have HHC?: No    Current Home Health Care  Type of Current Home Care Services: Home health aide (Private pay HHA)    Patient Information Continued  Income Source: Pension/skilled nursing  Does patient have prescription coverage?: Yes  Does patient receive dialysis treatments?: No  Does patient have a history of substance abuse?: No  Does patient have a history of Mental Health Diagnosis?: No         Means of Transportation  Means of Transport to Appts:: Family transport          DISCHARGE DETAILS:    Discharge planning discussed with:: Patient  Freedom of Choice: Yes  Comments - Freedom of Choice: No current CM DC needs  CM contacted family/caregiver?: No- see comments (Patient is A/O)  Were Treatment Team discharge recommendations reviewed with patient/caregiver?: Yes  Did patient/caregiver verbalize understanding of patient care needs?: Yes  Were patient/caregiver advised of the risks associated with not following Treatment Team discharge recommendations?: Yes    Contacts  Patient Contacts:  Patient  Relationship to Patient:: Other (Comment)  Contact Method: In Person  Reason/Outcome: Discharge Planning, Continuity of Care    Requested Home Health Care         Is the patient interested in HHC at discharge?: No    DME Referral Provided  Referral made for DME?: No    Other Referral/Resources/Interventions Provided:  Interventions: None Indicated         Treatment Team Recommendation: Home  Discharge Destination Plan:: Home  Transport at Discharge : Family                                         CM met with patient at bedside.  CM name and role reviewed.  CM assessment completed and charted above.    Patient lives at home alone.  She does have HHA that assists daily with tasks as needed.  Patient also reports that her ex- stops in daily to check on her and help her with anything she needs.    No current CM DC recommendations.  BSC was ordered and provided last admission.    Anticipate patient will be able to DC back home.    CM reviewed discharge planning process including the following: identifying caregivers at home, preference for d/c planning needs, [facility] program, availability of treatment team to discuss questions or concerns patient and/or family may have regarding diagnosis, plan of care, old or new medications and discharge planning.  CM will continue to follow for care coordination and update assessment as necessary.

## 2025-02-17 NOTE — ASSESSMENT & PLAN NOTE
Blood pressure has been elevated but stable in the 120s-150s/70s-80s    Plan:  Continue home amlodipine 5 mg daily.

## 2025-02-18 PROBLEM — T45.1X5A COMPLICATION OF CHEMOTHERAPY: Status: ACTIVE | Noted: 2025-02-15

## 2025-02-18 LAB
ANION GAP SERPL CALCULATED.3IONS-SCNC: 7 MMOL/L (ref 4–13)
BUN SERPL-MCNC: 20 MG/DL (ref 5–25)
CALCIUM SERPL-MCNC: 8.1 MG/DL (ref 8.4–10.2)
CHLORIDE SERPL-SCNC: 103 MMOL/L (ref 96–108)
CO2 SERPL-SCNC: 22 MMOL/L (ref 21–32)
CREAT SERPL-MCNC: 0.77 MG/DL (ref 0.6–1.3)
ERYTHROCYTE [DISTWIDTH] IN BLOOD BY AUTOMATED COUNT: 15.5 % (ref 11.6–15.1)
GFR SERPL CREATININE-BSD FRML MDRD: 76 ML/MIN/1.73SQ M
GLUCOSE SERPL-MCNC: 75 MG/DL (ref 65–140)
HCT VFR BLD AUTO: 25.7 % (ref 34.8–46.1)
HGB BLD-MCNC: 8.4 G/DL (ref 11.5–15.4)
MAGNESIUM SERPL-MCNC: 2.2 MG/DL (ref 1.9–2.7)
MCH RBC QN AUTO: 30 PG (ref 26.8–34.3)
MCHC RBC AUTO-ENTMCNC: 32.7 G/DL (ref 31.4–37.4)
MCV RBC AUTO: 92 FL (ref 82–98)
NRBC BLD AUTO-RTO: 0 /100 WBCS
PLATELET # BLD AUTO: 108 THOUSANDS/UL (ref 149–390)
PMV BLD AUTO: 10.6 FL (ref 8.9–12.7)
POTASSIUM SERPL-SCNC: 4.3 MMOL/L (ref 3.5–5.3)
RBC # BLD AUTO: 2.8 MILLION/UL (ref 3.81–5.12)
SODIUM SERPL-SCNC: 132 MMOL/L (ref 135–147)
WBC # BLD AUTO: 2.01 THOUSAND/UL (ref 4.31–10.16)

## 2025-02-18 PROCEDURE — 83735 ASSAY OF MAGNESIUM: CPT

## 2025-02-18 PROCEDURE — 85027 COMPLETE CBC AUTOMATED: CPT

## 2025-02-18 PROCEDURE — 80048 BASIC METABOLIC PNL TOTAL CA: CPT

## 2025-02-18 PROCEDURE — 99232 SBSQ HOSP IP/OBS MODERATE 35: CPT | Performed by: INTERNAL MEDICINE

## 2025-02-18 RX ORDER — LOPERAMIDE HYDROCHLORIDE 2 MG/1
2 CAPSULE ORAL EVERY 4 HOURS PRN
Status: DISCONTINUED | OUTPATIENT
Start: 2025-02-18 | End: 2025-02-19 | Stop reason: HOSPADM

## 2025-02-18 RX ADMIN — APIXABAN 2.5 MG: 2.5 TABLET, FILM COATED ORAL at 08:40

## 2025-02-18 RX ADMIN — ONDANSETRON 4 MG: 2 INJECTION INTRAMUSCULAR; INTRAVENOUS at 06:23

## 2025-02-18 RX ADMIN — LEVETIRACETAM 1000 MG: 500 TABLET, FILM COATED ORAL at 17:03

## 2025-02-18 RX ADMIN — PANTOPRAZOLE SODIUM 40 MG: 40 TABLET, DELAYED RELEASE ORAL at 06:21

## 2025-02-18 RX ADMIN — LEVOTHYROXINE SODIUM 50 MCG: 0.05 TABLET ORAL at 06:21

## 2025-02-18 RX ADMIN — ONDANSETRON 4 MG: 2 INJECTION INTRAMUSCULAR; INTRAVENOUS at 16:11

## 2025-02-18 RX ADMIN — ACETAMINOPHEN 975 MG: 325 TABLET, FILM COATED ORAL at 21:23

## 2025-02-18 RX ADMIN — AMLODIPINE BESYLATE 5 MG: 5 TABLET ORAL at 08:40

## 2025-02-18 RX ADMIN — DEXAMETHASONE 2 MG: 2 TABLET ORAL at 07:58

## 2025-02-18 RX ADMIN — GABAPENTIN 300 MG: 300 CAPSULE ORAL at 21:23

## 2025-02-18 RX ADMIN — FOLIC ACID 1 MG: 1 TABLET ORAL at 08:40

## 2025-02-18 RX ADMIN — LEVETIRACETAM 1000 MG: 500 TABLET, FILM COATED ORAL at 08:40

## 2025-02-18 RX ADMIN — CYANOCOBALAMIN TAB 500 MCG 1000 MCG: 500 TAB at 08:40

## 2025-02-18 RX ADMIN — GABAPENTIN 100 MG: 100 CAPSULE ORAL at 17:03

## 2025-02-18 RX ADMIN — TRIMETHOBENZAMIDE HYDROCHLORIDE 200 MG: 100 INJECTION INTRAMUSCULAR at 08:37

## 2025-02-18 RX ADMIN — GABAPENTIN 100 MG: 100 CAPSULE ORAL at 08:40

## 2025-02-18 RX ADMIN — DEXAMETHASONE 2 MG: 2 TABLET ORAL at 16:11

## 2025-02-18 RX ADMIN — ACETAMINOPHEN 975 MG: 325 TABLET, FILM COATED ORAL at 16:10

## 2025-02-18 RX ADMIN — ACETAMINOPHEN 975 MG: 325 TABLET, FILM COATED ORAL at 08:01

## 2025-02-18 RX ADMIN — APIXABAN 2.5 MG: 2.5 TABLET, FILM COATED ORAL at 17:03

## 2025-02-18 NOTE — CASE MANAGEMENT
Case Management Progress Note    Patient name Ayla Nye  Location S /S -01 MRN [id number]  : 1950 Date 2025       LOS (days): 4  Geometric Mean LOS (GMLOS) (days): 2.7  Days to GMLOS:-1.4        OBJECTIVE:        Current admission status: Inpatient  Preferred Pharmacy:   [id number] - [facility] [place] - [address] [facility]  [address] [place]  [facility] [place] [address]  Phone: [phone] Fax: [phone]    [facility] ([place] - [place] - [address]  [address] [facility]  [place] [address]  Phone: [phone] Fax: [phone]    Primary Care Provider: Rafy Angelo MD    Primary Insurance: MEDICARE  Secondary Insurance: AARP    PROGRESS NOTE:    Weekly Care Management Length of Stay Review     Current LOS: 4 Days    Most Recent Labs:     Lab Results   Component Value Date/Time    WBC 2.01 (L) 2025 04:33 AM    HGB 8.4 (L) 2025 04:33 AM    HCT 25.7 (L) 2025 04:33 AM     (L) 2025 04:33 AM    BANDSPCT 4 2025 01:00 AM    SODIUM 132 (L) 2025 04:33 AM    K 4.3 2025 04:33 AM     2025 04:33 AM    CO2 22 2025 04:33 AM    BUN 20 2025 04:33 AM    CREATININE 0.77 2025 04:33 AM    GLUC 75 2025 04:33 AM    ALKPHOS 41 2025 01:00 AM    ALT 11 2025 01:00 AM    AST 15 2025 01:00 AM    ALB 3.3 (L) 2025 01:00 AM    TBILI 0.45 2025 01:00 AM       Most Recent Vitals:   Vitals:    25 0657   BP: 136/83   Pulse: 83   Resp: 18   Temp: 97.9 °F (36.6 °C)   SpO2: 93%        Identified Barriers to Discharge/Discharge Goals/Care Management Interventions: severe sepsis, active chemotherapy, IV ABX to PO tomorrow.     Intended Discharge Disposition: Home    Expected Discharge Date:  24hrs

## 2025-02-18 NOTE — ASSESSMENT & PLAN NOTE
Patient was tachypneic and desatting to 85% on 2/16, requiring 2L O2  Lung exam was clear.  CXR on admission with no acute pathology.  Likely anxiety with low suspicion for PE or pneumonia.  2/17 Saturating at 91-94% on RA, though desaturated to 89% in the evening  No additional episodes of desaturation occurred today, and she has been stable at 92-93% SpO2    Plan:  Provide supplemental O2 as needed to maintain SpO2 > 90%, wean as tolerated  If she begins to require oxygen, can consider repeating CXR

## 2025-02-18 NOTE — ASSESSMENT & PLAN NOTE
Patient met severe sepsis criteria on arrival (tachycardia, fever 101 F at home, lactic acidosis 2.3) with unclear source.  Her last chemotherapy was on 2/13 with carboplatin, pemetrexed, and biologic Avastin  Treatments occurring regularly q3 weeks, often experiencing fevers, fatigue, and mild cough after treatment   Negative flu/COVID/RSV.  Negative strep pneumonia and Legionella.  CXR with no acute pathology.  Negative flu/COVID/RSV, strep pneumo and legionella urinary antigen negative  [place] UA  She was given 30 cc/kg per sepsis protocol  Her procalcitonin is trending down  2/16-patient was feeling nauseated with chills this morning.  At this point, her symptoms are likely chemotherapy-induced given no clear source of infection  2/17 Discontinued IV ceftriaxone   Leukopenic, likely secondary to chemo, noted on 2/17 to 2.55. Mild further decline to 2.05 today. Patient remains afebrile, and has only had minimal episodes of diarrhea in the last 24 hours. Coughing is minimal.  She continues to have nausea and poor oral intake, though she has been able to ambulate around her room  Hyponatremic to 132, likely from poor PO intake    Plan:  Trend fever curve/CBC daily.  Tylenol as needed for mild pain.  Tigan and Zofran for nausea.  Monitor vital signs closely.

## 2025-02-18 NOTE — PLAN OF CARE
Problem: PAIN - ADULT  Goal: Verbalizes/displays adequate comfort level or baseline comfort level  Description: Interventions:  - Encourage patient to monitor pain and request assistance  - Assess pain using appropriate pain scale  - Administer analgesics based on type and severity of pain and evaluate response  - Implement non-pharmacological measures as appropriate and evaluate response  - Consider cultural and social influences on pain and pain management  - Notify physician/advanced practitioner if interventions unsuccessful or patient reports new pain  Outcome: Progressing     Problem: INFECTION - ADULT  Goal: Absence or prevention of progression during hospitalization  Description: INTERVENTIONS:  - Assess and monitor for signs and symptoms of infection  - Monitor lab/diagnostic results  - Monitor all insertion sites, i.e. indwelling lines, tubes, and drains  - Monitor endotracheal if appropriate and nasal secretions for changes in amount and color  - [place] appropriate cooling/warming therapies per order  - Administer medications as ordered  - Instruct and encourage patient and family to use good hand hygiene technique  - Identify and instruct in appropriate isolation precautions for identified infection/condition  Outcome: Progressing     Problem: SAFETY ADULT  Goal: Patient will remain free of falls  Description: INTERVENTIONS:  - Educate patient/family on patient safety including physical limitations  - Instruct patient to call for assistance with activity   - Consult OT/PT to assist with strengthening/mobility   - Keep Call bell within reach  - Keep bed low and locked with side rails adjusted as appropriate  - Keep care items and personal belongings within reach  - Initiate and maintain comfort rounds  - Make Fall Risk Sign visible to staff  - Offer Toileting every 2 Hours, in advance of need  - Initiate/Maintain bed/chair alarm  - Obtain necessary fall risk management equipment  - Apply yellow socks  and bracelet for high fall risk patients  - Consider moving patient to room near nurses station  Outcome: Progressing  Goal: Maintain or return to baseline ADL function  Description: INTERVENTIONS:  -  Assess patient's ability to carry out ADLs; assess patient's baseline for ADL function and identify physical deficits which impact ability to perform ADLs (bathing, care of mouth/teeth, toileting, grooming, dressing, etc.)  - Assess/evaluate cause of self-care deficits   - Assess range of motion  - Assess patient's mobility; develop plan if impaired  - Assess patient's need for assistive devices and provide as appropriate  - Encourage maximum independence but intervene and supervise when necessary  - Involve family in performance of ADLs  - Assess for home care needs following discharge   - Consider OT consult to assist with ADL evaluation and planning for discharge  - Provide patient education as appropriate  Outcome: Progressing  Goal: Maintains/Returns to pre admission functional level  Description: INTERVENTIONS:  - Perform AM-PAC 6 Click Basic Mobility/ Daily Activity assessment daily.  - Set and communicate daily mobility goal to care team and patient/family/caregiver.   - Collaborate with rehabilitation services on mobility goals if consulted  - Perform Range of Motion 3 times a day.  - Reposition patient every 2 hours.  - Dangle patient 3 times a day  - Stand patient 3 times a day  - Ambulate patient 3 times a day  - Out of bed to chair 3 times a day   - Out of bed for meals 3 times a day  - Out of bed for toileting  - Record patient progress and toleration of activity level   Outcome: Progressing     Problem: DISCHARGE PLANNING  Goal: Discharge to home or other facility with appropriate resources  Description: INTERVENTIONS:  - Identify barriers to discharge w/patient and caregiver  - Arrange for needed discharge resources and transportation as appropriate  - Identify discharge learning needs (meds, wound  care, etc.)  - Arrange for interpretive services to assist at discharge as needed  - Refer to Case Management Department for coordinating discharge planning if the patient needs post-hospital services based on physician/advanced practitioner order or complex needs related to functional status, cognitive ability, or social support system  Outcome: Progressing     Problem: Knowledge Deficit  Goal: Patient/family/caregiver demonstrates understanding of disease process, treatment plan, medications, and discharge instructions  Description: Complete learning assessment and assess knowledge base.  Interventions:  - Provide teaching at level of understanding  - Provide teaching via preferred learning methods  Outcome: Progressing     Problem: Prexisting or High Potential for Compromised Skin Integrity  Goal: Skin integrity is maintained or improved  Description: INTERVENTIONS:  - Identify patients at risk for skin breakdown  - Assess and monitor skin integrity  - Assess and monitor nutrition and hydration status  - Monitor labs   - Assess for incontinence   - Turn and reposition patient  - Assist with mobility/ambulation  - Relieve pressure over bony prominences  - Avoid friction and shearing  - Provide appropriate hygiene as needed including keeping skin clean and dry  - Evaluate need for skin moisturizer/barrier cream  - Collaborate with interdisciplinary team   - Patient/family teaching  - Consider wound care consult   Outcome: Progressing     Problem: RESPIRATORY - ADULT  Goal: Achieves optimal ventilation and oxygenation  Description: INTERVENTIONS:  - Assess for changes in respiratory status  - Assess for changes in mentation and behavior  - Position to facilitate oxygenation and minimize respiratory effort  - Oxygen administered by appropriate delivery if ordered  - Initiate smoking cessation education as indicated  - Encourage broncho-pulmonary hygiene including cough, deep breathe, Incentive Spirometry  - Assess  the need for suctioning and aspirate as needed  - Assess and instruct to report SOB or any respiratory difficulty  - Respiratory Therapy support as indicated  Outcome: Progressing     Problem: GASTROINTESTINAL - ADULT  Goal: Minimal or absence of nausea and/or vomiting  Description: INTERVENTIONS:  - Administer IV fluids if ordered to ensure adequate hydration  - Maintain NPO status until nausea and vomiting are resolved  - Nasogastric tube if ordered  - Administer ordered antiemetic medications as needed  - Provide nonpharmacologic comfort measures as appropriate  - Advance diet as tolerated, if ordered  - Consider nutrition services referral to assist patient with adequate nutrition and appropriate food choices  Outcome: Progressing  Goal: Maintains or returns to baseline bowel function  Description: INTERVENTIONS:  - Assess bowel function  - Encourage oral fluids to ensure adequate hydration  - Administer IV fluids if ordered to ensure adequate hydration  - Administer ordered medications as needed  - Encourage mobilization and activity  - Consider nutritional services referral to assist patient with adequate nutrition and appropriate food choices  Outcome: Progressing  Goal: Maintains adequate nutritional intake  Description: INTERVENTIONS:  - Monitor percentage of each meal consumed  - Identify factors contributing to decreased intake, treat as appropriate  - Assist with meals as needed  - Monitor I&O, weight, and lab values if indicated  - Obtain nutrition services referral as needed  Outcome: Progressing  Goal: Oral mucous membranes remain intact  Description: INTERVENTIONS  - Assess oral mucosa and hygiene practices  - Implement preventative oral hygiene regimen  - Implement oral medicated treatments as ordered  - Initiate Nutrition services referral as needed  Outcome: Progressing     Problem: SKIN/TISSUE INTEGRITY - ADULT  Goal: Skin Integrity remains intact(Skin Breakdown Prevention)  Description:  Assess:  -Perform Benjamín assessment every   -Clean and moisturize skin every   -Inspect skin when repositioning, toileting, and assisting with ADLS  -Assess under medical devices such as  every   -Assess extremities for adequate circulation and sensation     Bed Management:  -Have minimal linens on bed & keep smooth, unwrinkled  -Change linens as needed when moist or perspiring  -Avoid sitting or lying in one position for more than  hours while in bed  -Keep HOB at degrees     Toileting:  -Offer bedside commode  -Assess for incontinence every   -Use incontinent care products after each incontinent episode such as     Activity:  -Mobilize patient  times a day  -Encourage activity and walks on unit  -Encourage or provide ROM exercises   -Turn and reposition patient every  Hours  -Use appropriate equipment to lift or move patient in bed  -Instruct/ Assist with weight shifting every  when out of bed in chair  -Consider limitation of chair time  hour intervals    Skin Care:  -Avoid use of baby powder, tape, friction and shearing, hot water or constrictive clothing  -Relieve pressure over bony prominences using   -Do not massage red bony areas    Next Steps:  -Teach patient strategies to minimize risks such as    -Consider consults to  interdisciplinary teams such as   Outcome: Progressing  Goal: Incision(s), wounds(s) or drain site(s) healing without S/S of infection  Description: INTERVENTIONS  - Assess and document dressing, incision, wound bed, drain sites and surrounding tissue  - Provide patient and family education  - Perform skin care/dressing changes every   Outcome: Progressing  Goal: Pressure injury heals and does not worsen  Description: Interventions:  - Implement low air loss mattress or specialty surface (Criteria met)  - Apply silicone foam dressing  - Instruct/assist with weight shifting every  minutes when in chair   - Limit chair time to  hour intervals  - Use special pressure reducing interventions  such as  when in chair   - Apply fecal or urinary incontinence containment device   - Perform passive or active ROM every   - Turn and reposition patient & offload bony prominences every  hours   - Utilize friction reducing device or surface for transfers   - Consider consults to  interdisciplinary teams such as   - Use incontinent care products after each incontinent episode such as   - Consider nutrition services referral as needed  Outcome: Progressing     Problem: Potential for Falls  Goal: Patient will remain free of falls  Description: INTERVENTIONS:  - Educate patient/family on patient safety including physical limitations  - Instruct patient to call for assistance with activity   - Consult OT/PT to assist with strengthening/mobility   - Keep Call bell within reach  - Keep bed low and locked with side rails adjusted as appropriate  - Keep care items and personal belongings within reach  - Initiate and maintain comfort rounds  - Make Fall Risk Sign visible to staff  - Offer Toileting every 2 Hours, in advance of need  - Initiate/Maintain bed/chair alarm  - Obtain necessary fall risk management equipment  - Apply yellow socks and bracelet for high fall risk patients  - Consider moving patient to room near nurses station  Outcome: Progressing

## 2025-02-18 NOTE — ASSESSMENT & PLAN NOTE
History of pulmonary embolism which occurred in November 2024  Takes Xarelto 20 mg daily at home  Due to recurrent epistaxis, started on Eliquis 2.5 mg twice daily while hospitalized    Plan:  Reached out to heme-onc regarding Xarelto dosage, as she is still within the 3-month window of PE. Recommending continuing Eliquis 2.5 mg BID or resuming Xarelto at 10 mg BID

## 2025-02-18 NOTE — ASSESSMENT & PLAN NOTE
Chronic anemia in the setting of malignancy.  Baseline hemoglobin (8-10)    Plan:  Currently stable. Continue to monitor CBC daily.

## 2025-02-18 NOTE — PROGRESS NOTES
Progress Note - Hospitalist   Name: Ayla Nye 74 y.o. female I MRN: [id number]  Unit/Bed#: S -01 I Date of Admission: 2/14/2025   Date of Service: 2/18/2025 I Hospital Day: 4    Assessment & Plan  Complication of chemotherapy/immunotherapy  Patient met severe sepsis criteria on arrival (tachycardia, fever 101 F at home, lactic acidosis 2.3) with unclear source.  Her last chemotherapy was on 2/13 with carboplatin, pemetrexed, and biologic Avastin  Treatments occurring regularly q3 weeks, often experiencing fevers, fatigue, and mild cough after treatment   Negative flu/COVID/RSV.  Negative strep pneumonia and Legionella.  CXR with no acute pathology.  Negative flu/COVID/RSV, strep pneumo and legionella urinary antigen negative  [place] UA  She was given 30 cc/kg per sepsis protocol  Her procalcitonin is trending down  2/16-patient was feeling nauseated with chills this morning.  At this point, her symptoms are likely chemotherapy-induced given no clear source of infection  2/17 Discontinued IV ceftriaxone   Leukopenic, likely secondary to chemo, noted on 2/17 to 2.55. Mild further decline to 2.05 today. Patient remains afebrile, and has only had minimal episodes of diarrhea in the last 24 hours. Coughing is minimal.  She continues to have nausea and poor oral intake, though she has been able to ambulate around her room  Hyponatremic to 132, likely from poor PO intake    Plan:  Trend fever curve/CBC daily.  Tylenol as needed for mild pain.  Tigan and Zofran for nausea.  Monitor vital signs closely.  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 Last treatment on 2/13, planned to continue q3 weeks  Most recent CT head without contrast 2/3/2025: Evidence of parietal and occipital vasogenic edema  without significant change from prior 12/27/2024  Currently on steroid taper with Decadron which started on 2/4, and is anticipated to end on Tuesday 2/25. Previous admission was complicated by symptoms of AMS with discontinuation of steroids  Takes Keppra 1000 mg BID for seizure prophylaxis    Plan:  Continue steroid taper with Decadron and monitor for AMS  Continue Keppra 1000 mg twice daily    Acute respiratory failure with hypoxia (HCC)  Patient was tachypneic and desatting to 85% on 2/16, requiring 2L O2  Lung exam was clear.  CXR on admission with no acute pathology.  Likely anxiety with low suspicion for PE or pneumonia.  2/17 Saturating at 91-94% on RA, though desaturated to 89% in the evening  No additional episodes of desaturation occurred today, and she has been stable at 92-93% SpO2    Plan:  Provide supplemental O2 as needed to maintain SpO2 > 90%, wean as tolerated  If she begins to require oxygen, can consider repeating CXR  History of Pneumocystis jirovecii pneumonia  Continue home Bactrim on MWF  Hypertension  Blood pressure has been elevated but stable in the 120s-150s/70s-80s    Plan:  Continue home amlodipine 5 mg daily.  GERD (gastroesophageal reflux disease)  Continue home Protonix 40 mg daily  Hypothyroidism  Continue home levothyroxine 50 mcg daily  History of pulmonary embolus (PE)  History of pulmonary embolism which occurred in November 2024  Takes Xarelto 20 mg daily at home  Due to recurrent epistaxis, started on Eliquis 2.5 mg twice daily while hospitalized    Plan:  Reached out to heme-onc regarding Xarelto dosage, as she is still within the 3-month window of PE. Recommending continuing Eliquis 2.5 mg BID or resuming Xarelto at 10 mg BID  Anemia  Chronic anemia in the setting of malignancy.  Baseline hemoglobin (8-10)    Plan:  Currently stable. Continue to monitor CBC daily.    VTE Pharmacologic Prophylaxis: VTE Score: 9 High Risk (Score >/= 5) - Pharmacological DVT Prophylaxis  "Ordered: apixaban (Eliquis). Sequential Compression Devices Ordered.    Mobility:   Basic Mobility Inpatient Raw Score: 18  JH-HLM Goal: 6: Walk 10 steps or more  JH-HLM Achieved: 5: Stand (1 or more minutes)  JH-HLM Goal NOT achieved. Continue with multidisciplinary rounding and encourage appropriate mobility to improve upon JH-HLM goals.    Patient Centered Rounds: I performed bedside rounds with nursing staff today.   Discussions with Specialists or Other Care Team Provider: Hematology Oncology    Education and Discussions with Family / Patient: Updated  (daughter) at bedside.    Current Length of Stay: 4 day(s)  Current Patient Status: Inpatient   Certification Statement: The patient will continue to require additional inpatient hospital stay due to medical optimization  Discharge Plan: Anticipate discharge tomorrow to home with home services.    Code Status: Level 1 - Full Code    Nora Aguila was seen and examined at the bedside. Today, she notes that she feels \"slightly better,\" but still feels weak and significantly nauseous. She was able to eat a container of yogurt today, though she was unable to tolerate much yesterday. She inquired about receiving Tigan, which she finds is more helpful than Zofran. She was able to tolerate more PO intake afterwards, and was encouraged to increase ambulation.     Objective :  Temp:  [97.9 °F (36.6 °C)-99.5 °F (37.5 °C)] 97.9 °F (36.6 °C)  HR:  [77-89] 83  BP: (128-150)/(82-85) 136/83  Resp:  [18] 18  SpO2:  [89 %-93 %] 93 %  O2 Device: None (Room air)    Body mass index is 23.43 kg/m².     Input and Output Summary (last 24 hours):     Intake/Output Summary (Last 24 hours) at 2/18/2025 1418  Last data filed at 2/18/2025 0051  Gross per 24 hour   Intake 0 ml   Output 50 ml   Net -50 ml       Physical Exam  Vitals reviewed.   Constitutional:       Appearance: Normal appearance.   HENT:      Head: Normocephalic and atraumatic.      Mouth/Throat:      " Mouth: Mucous membranes are dry.      Pharynx: Oropharynx is clear.   Eyes:      General: No scleral icterus.     Extraocular Movements: Extraocular movements intact.      Conjunctiva/sclera: Conjunctivae normal.   Cardiovascular:      Rate and Rhythm: Normal rate and regular rhythm.      Heart sounds: Normal heart sounds. No murmur heard.     No friction rub. No gallop.   Pulmonary:      Effort: Pulmonary effort is normal. No respiratory distress.      Breath sounds: Normal breath sounds. No stridor. No wheezing, rhonchi or rales.   Abdominal:      General: Abdomen is flat. There is no distension.      Palpations: Abdomen is soft.      Tenderness: There is no abdominal tenderness. There is no guarding.   Musculoskeletal:      Right lower leg: No edema.      Left lower leg: No edema.   Skin:     General: Skin is warm and dry.      Coloration: Skin is pale.   Neurological:      General: No focal deficit present.      Mental Status: She is alert and oriented to person, place, and time.   Psychiatric:         Mood and Affect: Mood normal.         Behavior: Behavior normal.           Lines/Drains:              Lab Results: I have reviewed the following results:   Results from last 7 days   Lab Units 02/18/25  0433 02/17/25  0100 02/16/25  0416   WBC Thousand/uL 2.01* 2.55* 8.52   HEMOGLOBIN g/dL 8.4* 8.4* 8.2*   HEMATOCRIT % 25.7* 26.4* 26.9*   PLATELETS Thousands/uL 108* 140* 140*   BANDS PCT %  --  4  --    SEGS PCT %  --   --  96*   LYMPHO PCT %  --  3* 2*   MONO PCT %  --  1* 1*   EOS PCT %  --  0 0     Results from last 7 days   Lab Units 02/18/25  0433 02/17/25  0100   SODIUM mmol/L 132* 135   POTASSIUM mmol/L 4.3 4.3   CHLORIDE mmol/L 103 106   CO2 mmol/L 22 21   BUN mg/dL 20 24   CREATININE mg/dL 0.77 0.86   ANION GAP mmol/L 7 8   CALCIUM mg/dL 8.1* 8.2*   ALBUMIN g/dL  --  3.3*   TOTAL BILIRUBIN mg/dL  --  0.45   ALK PHOS U/L  --  41   ALT U/L  --  11   AST U/L  --  15   GLUCOSE RANDOM mg/dL 75 103     Results  from last 7 days   Lab Units 02/14/25  0800   INR  1.25*             Results from last 7 days   Lab Units 02/16/25  0416 02/15/25  0431 02/14/25  1122 02/14/25  0800   LACTIC ACID mmol/L  --  1.2 1.7 2.3*   PROCALCITONIN ng/ml 12.10* 23.18*  --  45.70*       Recent Cultures (last 7 days):   Results from last 7 days   Lab Units 02/16/25  1300 02/14/25  1622 02/14/25  0800   BLOOD CULTURE   --   --  No Growth After 4 Days.  No Growth After 4 Days.   LEGIONELLA URINARY ANTIGEN   --  Negative  --    C DIFF TOXIN B BY PCR  Negative  --   --              Last 24 Hours Medication List:     Current Facility-Administered Medications:     acetaminophen (TYLENOL) tablet 975 mg, TID    amLODIPine (NORVASC) tablet 5 mg, Daily    apixaban (ELIQUIS) tablet 2.5 mg, BID    cyanocobalamin (VITAMIN B-12) tablet 1,000 mcg, Daily    [COMPLETED] dexamethasone (DECADRON) tablet 3 mg, BID With Meals **FOLLOWED BY** dexamethasone (DECADRON) tablet 2 mg, BID With Meals    folic acid (FOLVITE) tablet 1 mg, Daily    gabapentin (NEURONTIN) capsule 100 mg, BID    gabapentin (NEURONTIN) capsule 300 mg, HS    levETIRAcetam (KEPPRA) tablet 1,000 mg, BID    levothyroxine tablet 50 mcg, Early Morning    loperamide (IMODIUM) capsule 2 mg, Q4H PRN    ondansetron (ZOFRAN) injection 4 mg, Q4H PRN    pantoprazole (PROTONIX) EC tablet 40 mg, Daily Before Breakfast    senna (SENOKOT) tablet 8.6 mg, Daily    sodium chloride (OCEAN) 0.65 % nasal spray 1 spray, Q1H PRN    sulfamethoxazole-trimethoprim (BACTRIM DS) 800-160 mg per tablet 1 tablet, Once per day on Monday Wednesday Friday    trimethobenzamide (TIGAN) IM injection 200 mg, Q6H PRN    Administrative Statements   Today, Patient Was Seen By: Nimco Patel DO      **Please Note: This note may have been constructed using a voice recognition system.**

## 2025-02-19 ENCOUNTER — TELEPHONE (OUTPATIENT)
Age: 75
End: 2025-02-19

## 2025-02-19 VITALS
SYSTOLIC BLOOD PRESSURE: 130 MMHG | RESPIRATION RATE: 16 BRPM | BODY MASS INDEX: 23.57 KG/M2 | HEART RATE: 78 BPM | WEIGHT: 128.09 LBS | HEIGHT: 62 IN | DIASTOLIC BLOOD PRESSURE: 77 MMHG | OXYGEN SATURATION: 93 % | TEMPERATURE: 98.2 F

## 2025-02-19 LAB
ANION GAP SERPL CALCULATED.3IONS-SCNC: 7 MMOL/L (ref 4–13)
BACTERIA BLD CULT: NORMAL
BACTERIA BLD CULT: NORMAL
BASOPHILS # BLD AUTO: 0.01 THOUSANDS/ΜL (ref 0–0.1)
BASOPHILS NFR BLD AUTO: 1 % (ref 0–1)
BUN SERPL-MCNC: 17 MG/DL (ref 5–25)
CALCIUM SERPL-MCNC: 8.3 MG/DL (ref 8.4–10.2)
CHLORIDE SERPL-SCNC: 104 MMOL/L (ref 96–108)
CO2 SERPL-SCNC: 23 MMOL/L (ref 21–32)
CREAT SERPL-MCNC: 0.77 MG/DL (ref 0.6–1.3)
EOSINOPHIL # BLD AUTO: 0.01 THOUSAND/ΜL (ref 0–0.61)
EOSINOPHIL NFR BLD AUTO: 1 % (ref 0–6)
ERYTHROCYTE [DISTWIDTH] IN BLOOD BY AUTOMATED COUNT: 15.7 % (ref 11.6–15.1)
GFR SERPL CREATININE-BSD FRML MDRD: 76 ML/MIN/1.73SQ M
GLUCOSE SERPL-MCNC: 83 MG/DL (ref 65–140)
HCT VFR BLD AUTO: 27.1 % (ref 34.8–46.1)
HGB BLD-MCNC: 8.6 G/DL (ref 11.5–15.4)
IMM GRANULOCYTES # BLD AUTO: 0.03 THOUSAND/UL (ref 0–0.2)
IMM GRANULOCYTES NFR BLD AUTO: 1 % (ref 0–2)
LYMPHOCYTES # BLD AUTO: 0.45 THOUSANDS/ΜL (ref 0.6–4.47)
LYMPHOCYTES NFR BLD AUTO: 21 % (ref 14–44)
MAGNESIUM SERPL-MCNC: 2 MG/DL (ref 1.9–2.7)
MCH RBC QN AUTO: 29.8 PG (ref 26.8–34.3)
MCHC RBC AUTO-ENTMCNC: 31.7 G/DL (ref 31.4–37.4)
MCV RBC AUTO: 94 FL (ref 82–98)
MONOCYTES # BLD AUTO: 0.1 THOUSAND/ΜL (ref 0.17–1.22)
MONOCYTES NFR BLD AUTO: 5 % (ref 4–12)
NEUTROPHILS # BLD AUTO: 1.5 THOUSANDS/ΜL (ref 1.85–7.62)
NEUTS SEG NFR BLD AUTO: 71 % (ref 43–75)
NRBC BLD AUTO-RTO: 0 /100 WBCS
PHOSPHATE SERPL-MCNC: 3 MG/DL (ref 2.3–4.1)
PLATELET # BLD AUTO: 99 THOUSANDS/UL (ref 149–390)
PMV BLD AUTO: 10.7 FL (ref 8.9–12.7)
POTASSIUM SERPL-SCNC: 4.4 MMOL/L (ref 3.5–5.3)
RBC # BLD AUTO: 2.89 MILLION/UL (ref 3.81–5.12)
SODIUM SERPL-SCNC: 134 MMOL/L (ref 135–147)
WBC # BLD AUTO: 2.1 THOUSAND/UL (ref 4.31–10.16)

## 2025-02-19 PROCEDURE — 83735 ASSAY OF MAGNESIUM: CPT | Performed by: INTERNAL MEDICINE

## 2025-02-19 PROCEDURE — 84100 ASSAY OF PHOSPHORUS: CPT | Performed by: INTERNAL MEDICINE

## 2025-02-19 PROCEDURE — 80048 BASIC METABOLIC PNL TOTAL CA: CPT | Performed by: INTERNAL MEDICINE

## 2025-02-19 PROCEDURE — 99239 HOSP IP/OBS DSCHRG MGMT >30: CPT | Performed by: INTERNAL MEDICINE

## 2025-02-19 PROCEDURE — 85025 COMPLETE CBC W/AUTO DIFF WBC: CPT | Performed by: INTERNAL MEDICINE

## 2025-02-19 RX ORDER — ONDANSETRON 4 MG/1
4 TABLET, FILM COATED ORAL EVERY 8 HOURS PRN
Qty: 30 TABLET | Refills: 2 | Status: SHIPPED | OUTPATIENT
Start: 2025-02-19

## 2025-02-19 RX ADMIN — LEVOTHYROXINE SODIUM 50 MCG: 0.05 TABLET ORAL at 06:01

## 2025-02-19 RX ADMIN — AMLODIPINE BESYLATE 5 MG: 5 TABLET ORAL at 08:20

## 2025-02-19 RX ADMIN — SULFAMETHOXAZOLE AND TRIMETHOPRIM 1 TABLET: 800; 160 TABLET ORAL at 08:21

## 2025-02-19 RX ADMIN — DEXAMETHASONE 2 MG: 2 TABLET ORAL at 08:21

## 2025-02-19 RX ADMIN — FOLIC ACID 1 MG: 1 TABLET ORAL at 08:21

## 2025-02-19 RX ADMIN — ONDANSETRON 4 MG: 2 INJECTION INTRAMUSCULAR; INTRAVENOUS at 08:20

## 2025-02-19 RX ADMIN — ACETAMINOPHEN 975 MG: 325 TABLET, FILM COATED ORAL at 08:21

## 2025-02-19 RX ADMIN — PANTOPRAZOLE SODIUM 40 MG: 40 TABLET, DELAYED RELEASE ORAL at 06:02

## 2025-02-19 RX ADMIN — APIXABAN 2.5 MG: 2.5 TABLET, FILM COATED ORAL at 08:21

## 2025-02-19 RX ADMIN — CYANOCOBALAMIN TAB 500 MCG 1000 MCG: 500 TAB at 08:21

## 2025-02-19 RX ADMIN — GABAPENTIN 100 MG: 100 CAPSULE ORAL at 08:21

## 2025-02-19 RX ADMIN — LEVETIRACETAM 1000 MG: 500 TABLET, FILM COATED ORAL at 08:21

## 2025-02-19 NOTE — CASE MANAGEMENT
Case Management Discharge Planning Note    Patient name Ayla Nye  Location S /S -01 MRN [id number]  : 1950 Date 2025       Current Admission Date: 2025  Current Admission Diagnosis:Complication of chemotherapy/immunotherapy   Patient Active Problem List    Diagnosis Date Noted Date Diagnosed    Complication of chemotherapy/immunotherapy 02/15/2025     GERD (gastroesophageal reflux disease) 2025     Epistaxis 2025     History of Pneumocystis jirovecii pneumonia 2025     IgG deficiency (HCC) 2025     Right kidney mass 2025     History of pulmonary embolus (PE) 2024     Dyspnea on exertion 2024     Imbalance 2024     Acute respiratory failure with hypoxia (HCC) 2024     Hyponatremia 2024     Leukocytosis 10/27/2024     Anemia 10/25/2024     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     Palliative care patient 2024     Cancer associated pain 2024     Goals of care, counseling/discussion 2024     Right hip pain 09/10/2024     Altered mental status 2024     Hypothyroidism 2024     Abnormal imaging of central nervous system 2024     Acute metabolic encephalopathy 2024     Lung cancer metastatic to brain (HCC) 2024     Brain mass 2024     Metastatic cancer to brain (HCC) 2024     Visual disturbance 2024     Dehydration 2024     Moderate protein-calorie malnutrition (HCC) 2024     Bilateral leg pain 2024     Insomnia 2024     Metastatic adenocarcinoma (HCC) 2024     Chronic kidney disease, stage 3b (HCC) 2024     Age-related osteoporosis without current pathological fracture 2023     Encounter for monitoring of hydroxyurea therapy 2023     JAK2 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 2023     Hypertension 08/10/2022     Mixed hyperlipidemia 08/10/2022     Essential thrombocytosis (HCC) 2020     TB lung,  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     Psoriatic arthritis (HCC) 09/10/2019       LOS (days): 5  Geometric Mean LOS (GMLOS) (days): 3.8  Days to GMLOS:-1.3     OBJECTIVE:  Risk of Unplanned Readmission Score: 66.01         Current admission status: Inpatient   Preferred Pharmacy:   [id number] - [facility] [place] - [address] [facility]  [address] [place]  [facility] [address]  Phone: [phone] Fax: [phone]    [facility] [place] [facility] [place] - [address]  [address]  [facility] [address]  Phone: [phone] Fax: [phone]    Primary Care Provider: Rafy Angelo MD    Primary Insurance: MEDICARE  Secondary Insurance: AARP    DISCHARGE DETAILS:    Discharge planning discussed with:: Patient and Juliana Camarena (Daughter)  Freedom of Choice: Yes  Comments - Freedom of Choice: Discussed transport home and DME  CM contacted family/caregiver?: Yes  Were Treatment Team discharge recommendations reviewed with patient/caregiver?: Yes  Did patient/caregiver verbalize understanding of patient care needs?: Yes  Were patient/caregiver advised of the risks associated with not following Treatment Team discharge recommendations?: Yes    Contacts  Patient Contacts: Juliana Camarena (Daughter)  Relationship to Patient:: Family  Contact Method: Phone  Phone Number: [phone]  Reason/Outcome: Discharge Planning, Emergency Contact, Continuity of Care    Requested Home Health Care         Is the patient interested in HHC at discharge?: No    DME Referral Provided  Referral made for DME?: No (Discussed out of pocket cost for shower chair)    Other Referral/Resources/Interventions Provided:  Interventions: HHA, DME, Transportation         Treatment Team Recommendation: Home  Discharge Destination Plan:: Home  Transport at Discharge : Family                             IMM Given (Date):: 02/19/25  IMM Given to:: Patient        IMM reviewed with patient.  Patient agrees with  discharge determination.     CM was notified that patient is medically stable for DC home today.    CM spoke with patient's daughter Juliana who reported that she would be picking patient up around 3pm.  Juliana also asked about getting a shower chair ordered for patient.  CM advised that we could send an order for this through [place] to [employer] to see what their out of pocket cost would be but explained that shower chairs will typically have a cost as they are not covered by insurance.  Daughter verbalized understanding and stated that she will purchase one from [employer] or [employer].    No further CM DC needs were discussed or identified at this time.    CM reviewed the availability of treatment team to discuss questions or concerns patient and/or family may have regarding  understanding medications and recognizing signs and symptoms once discharged.  CM also encouraged patient to follow up with all recommended appointments after discharge. Patient advised of importance for patient and family to participate in managing patient's medical well being.

## 2025-02-19 NOTE — DISCHARGE INSTR - AVS FIRST PAGE
Dear Ayla Nye,     It was our pleasure to care for you here at [facility].  It is our hope that we were always able to exceed the expected standards for your care during your stay.  You were hospitalized due to fever, fatigue, cough, and diarrhea.  You were cared for on the fourth floor by Nimco Patel DO under the service of Zeynep Pack MD with the [facility] who covers for your primary care physician (PCP), Rafy Angelo MD, while you were hospitalized.  If you have any questions or concerns related to this hospitalization, you may contact us at .  For follow up as well as any medication refills, we recommend that you follow up with your primary care physician.  A registered nurse will reach out to you by phone within a few days after your discharge to answer any additional questions that you may have after going home.  However, at this time we provide for you here, the most important instructions / recommendations at discharge:     Notable Medication Adjustments -   Please continue taking your dexamethasone taper as per Dr. Castro's instructions  Take 2 tablets (2 mg total) two times daily with meals starting tonight at 4 PM until 2/25/2025  Please CHANGE how you take your Xarelto. Please take 0.5 tablet (10 mg total) of rivaroxaban (Xarelto) by mouth daily with breakfast  Please take 1 tablet (4 mg total) of ondansetron (Zofran) by mouth every 8 hours as needed for nausea or vomiting starting today at 4 PM  Important follow up information -   Please follow-up with your primary care provider within a week of discharge  Other Instructions -   Please take all your medications regularly as directed  If you start experiencing intractable vomiting or diarrhea, chest pain, palpitations, persistent lightheadedness, or new weakness, please contact your primary provider. If you are unable to reach their office, please go to  the nearest ED.   Please review this entire after visit summary as additional general instructions including medication list, appointments, activity, diet, any pertinent wound care, and other additional recommendations from your care team that may be provided for you.      Sincerely,     Nimco Patel, DO

## 2025-02-19 NOTE — PLAN OF CARE
Problem: PAIN - ADULT  Goal: Verbalizes/displays adequate comfort level or baseline comfort level  Description: Interventions:  - Encourage patient to monitor pain and request assistance  - Assess pain using appropriate pain scale  - Administer analgesics based on type and severity of pain and evaluate response  - Implement non-pharmacological measures as appropriate and evaluate response  - Consider cultural and social influences on pain and pain management  - Notify physician/advanced practitioner if interventions unsuccessful or patient reports new pain  Outcome: Progressing     Problem: INFECTION - ADULT  Goal: Absence or prevention of progression during hospitalization  Description: INTERVENTIONS:  - Assess and monitor for signs and symptoms of infection  - Monitor lab/diagnostic results  - Monitor all insertion sites, i.e. indwelling lines, tubes, and drains  - Monitor endotracheal if appropriate and nasal secretions for changes in amount and color  - [place] appropriate cooling/warming therapies per order  - Administer medications as ordered  - Instruct and encourage patient and family to use good hand hygiene technique  - Identify and instruct in appropriate isolation precautions for identified infection/condition  Outcome: Progressing     Problem: SAFETY ADULT  Goal: Patient will remain free of falls  Description: INTERVENTIONS:  - Educate patient/family on patient safety including physical limitations  - Instruct patient to call for assistance with activity   - Consult OT/PT to assist with strengthening/mobility   - Keep Call bell within reach  - Keep bed low and locked with side rails adjusted as appropriate  - Keep care items and personal belongings within reach  - Initiate and maintain comfort rounds  - Make Fall Risk Sign visible to staff  - Offer Toileting every 2 Hours, in advance of need  - Initiate/Maintain bed/chair alarm  - Obtain necessary fall risk management equipment  - Apply yellow socks  and bracelet for high fall risk patients  - Consider moving patient to room near nurses station  Outcome: Progressing  Goal: Maintain or return to baseline ADL function  Description: INTERVENTIONS:  -  Assess patient's ability to carry out ADLs; assess patient's baseline for ADL function and identify physical deficits which impact ability to perform ADLs (bathing, care of mouth/teeth, toileting, grooming, dressing, etc.)  - Assess/evaluate cause of self-care deficits   - Assess range of motion  - Assess patient's mobility; develop plan if impaired  - Assess patient's need for assistive devices and provide as appropriate  - Encourage maximum independence but intervene and supervise when necessary  - Involve family in performance of ADLs  - Assess for home care needs following discharge   - Consider OT consult to assist with ADL evaluation and planning for discharge  - Provide patient education as appropriate  Outcome: Progressing  Goal: Maintains/Returns to pre admission functional level  Description: INTERVENTIONS:  - Perform AM-PAC 6 Click Basic Mobility/ Daily Activity assessment daily.  - Set and communicate daily mobility goal to care team and patient/family/caregiver.   - Collaborate with rehabilitation services on mobility goals if consulted  - Perform Range of Motion 3 times a day.  - Reposition patient every 2 hours.  - Dangle patient 3 times a day  - Stand patient 3 times a day  - Ambulate patient 3 times a day  - Out of bed to chair 3 times a day   - Out of bed for meals 3 times a day  - Out of bed for toileting  - Record patient progress and toleration of activity level   Outcome: Progressing     Problem: DISCHARGE PLANNING  Goal: Discharge to home or other facility with appropriate resources  Description: INTERVENTIONS:  - Identify barriers to discharge w/patient and caregiver  - Arrange for needed discharge resources and transportation as appropriate  - Identify discharge learning needs (meds, wound  care, etc.)  - Arrange for interpretive services to assist at discharge as needed  - Refer to Case Management Department for coordinating discharge planning if the patient needs post-hospital services based on physician/advanced practitioner order or complex needs related to functional status, cognitive ability, or social support system  Outcome: Progressing     Problem: Knowledge Deficit  Goal: Patient/family/caregiver demonstrates understanding of disease process, treatment plan, medications, and discharge instructions  Description: Complete learning assessment and assess knowledge base.  Interventions:  - Provide teaching at level of understanding  - Provide teaching via preferred learning methods  Outcome: Progressing     Problem: Prexisting or High Potential for Compromised Skin Integrity  Goal: Skin integrity is maintained or improved  Description: INTERVENTIONS:  - Identify patients at risk for skin breakdown  - Assess and monitor skin integrity  - Assess and monitor nutrition and hydration status  - Monitor labs   - Assess for incontinence   - Turn and reposition patient  - Assist with mobility/ambulation  - Relieve pressure over bony prominences  - Avoid friction and shearing  - Provide appropriate hygiene as needed including keeping skin clean and dry  - Evaluate need for skin moisturizer/barrier cream  - Collaborate with interdisciplinary team   - Patient/family teaching  - Consider wound care consult   Outcome: Progressing     Problem: RESPIRATORY - ADULT  Goal: Achieves optimal ventilation and oxygenation  Description: INTERVENTIONS:  - Assess for changes in respiratory status  - Assess for changes in mentation and behavior  - Position to facilitate oxygenation and minimize respiratory effort  - Oxygen administered by appropriate delivery if ordered  - Initiate smoking cessation education as indicated  - Encourage broncho-pulmonary hygiene including cough, deep breathe, Incentive Spirometry  - Assess  the need for suctioning and aspirate as needed  - Assess and instruct to report SOB or any respiratory difficulty  - Respiratory Therapy support as indicated  Outcome: Progressing     Problem: GASTROINTESTINAL - ADULT  Goal: Minimal or absence of nausea and/or vomiting  Description: INTERVENTIONS:  - Administer IV fluids if ordered to ensure adequate hydration  - Maintain NPO status until nausea and vomiting are resolved  - Nasogastric tube if ordered  - Administer ordered antiemetic medications as needed  - Provide nonpharmacologic comfort measures as appropriate  - Advance diet as tolerated, if ordered  - Consider nutrition services referral to assist patient with adequate nutrition and appropriate food choices  Outcome: Progressing  Goal: Maintains or returns to baseline bowel function  Description: INTERVENTIONS:  - Assess bowel function  - Encourage oral fluids to ensure adequate hydration  - Administer IV fluids if ordered to ensure adequate hydration  - Administer ordered medications as needed  - Encourage mobilization and activity  - Consider nutritional services referral to assist patient with adequate nutrition and appropriate food choices  Outcome: Progressing  Goal: Maintains adequate nutritional intake  Description: INTERVENTIONS:  - Monitor percentage of each meal consumed  - Identify factors contributing to decreased intake, treat as appropriate  - Assist with meals as needed  - Monitor I&O, weight, and lab values if indicated  - Obtain nutrition services referral as needed  Outcome: Progressing  Goal: Oral mucous membranes remain intact  Description: INTERVENTIONS  - Assess oral mucosa and hygiene practices  - Implement preventative oral hygiene regimen  - Implement oral medicated treatments as ordered  - Initiate Nutrition services referral as needed  Outcome: Progressing     Problem: SKIN/TISSUE INTEGRITY - ADULT  Goal: Skin Integrity remains intact(Skin Breakdown Prevention)  Description:  Assess:  -Perform Benjamín assessment every   -Clean and moisturize skin every   -Inspect skin when repositioning, toileting, and assisting with ADLS  -Assess under medical devices such as  every   -Assess extremities for adequate circulation and sensation     Bed Management:  -Have minimal linens on bed & keep smooth, unwrinkled  -Change linens as needed when moist or perspiring  -Avoid sitting or lying in one position for more than  hours while in bed  -Keep HOB at degrees     Toileting:  -Offer bedside commode  -Assess for incontinence every   -Use incontinent care products after each incontinent episode such as     Activity:  -Mobilize patient  times a day  -Encourage activity and walks on unit  -Encourage or provide ROM exercises   -Turn and reposition patient every  Hours  -Use appropriate equipment to lift or move patient in bed  -Instruct/ Assist with weight shifting every  when out of bed in chair  -Consider limitation of chair time  hour intervals    Skin Care:  -Avoid use of baby powder, tape, friction and shearing, hot water or constrictive clothing  -Relieve pressure over bony prominences using   -Do not massage red bony areas    Next Steps:  -Teach patient strategies to minimize risks such as   -Consider consults to  interdisciplinary teams such as   Outcome: Progressin  Goal: Incision(s), wounds(s) or drain site(s) healing without S/S of infection  Description: INTERVENTIONS  - Assess and document dressing, incision, wound bed, drain sites and surrounding tissue  - Provide patient and family education  - Perform skin care/dressing changes every   Outcome: Progressing  Goal: Pressure injury heals and does not worsen  Description: Interventions:  - Implement low air loss mattress or specialty surface (Criteria met)  - Apply silicone foam dressing  - Instruct/assist with weight shifting every minutes when in chair   - Limit chair time to  hour intervals  - Use special pressure reducing interventions such  as  when in chair   - Apply fecal or urinary incontinence containment device   - Perform passive or active ROM every   - Turn and reposition patient & offload bony prominences every  hours   - Utilize friction reducing device or surface for transfers   - Consider consults to  interdisciplinary teams such as   - Use incontinent care products after each incontinent episode such as   - Consider nutrition services referral as needed  Outcome: Progressing     Problem: Potential for Falls  Goal: Patient will remain free of falls  Description: INTERVENTIONS:  - Educate patient/family on patient safety including physical limitations  - Instruct patient to call for assistance with activity   - Consult OT/PT to assist with strengthening/mobility   - Keep Call bell within reach  - Keep bed low and locked with side rails adjusted as appropriate  - Keep care items and personal belongings within reach  - Initiate and maintain comfort rounds  - Make Fall Risk Sign visible to staff  - Offer Toileting every 2 Hours, in advance of need  - Initiate/Maintain bed/chair alarm  - Obtain necessary fall risk management equipment  - Apply yellow socks and bracelet for high fall risk patients  - Consider moving patient to room near nurses station  Outcome: Progressing

## 2025-02-20 ENCOUNTER — TELEPHONE (OUTPATIENT)
Dept: HEMATOLOGY ONCOLOGY | Facility: CLINIC | Age: 75
End: 2025-02-20

## 2025-02-20 NOTE — ASSESSMENT & PLAN NOTE
Patient met severe sepsis criteria on arrival (tachycardia, fever 101 F at home, lactic acidosis 2.3, procalcitonin of 45) with unclear source.  Her last chemotherapy was on 2/13 with carboplatin, pemetrexed, and biologic Avastin  Treatments occurring regularly q3 weeks, often experiencing fevers, fatigue, and mild cough after treatment   Negative flu/COVID/RSV.  Negative strep pneumonia and Legionella.  CXR with no acute pathology.  Negative flu/COVID/RSV, strep pneumo and legionella urinary antigen negative  [place] [facility]  She was given 30 cc/kg per sepsis protocol  Her procalcitonin is trending down  2/16-patient was feeling nauseated with chills this morning.  At this point, her symptoms are likely chemotherapy-induced given no clear source of infection  2/17 Discontinued IV ceftriaxone   Leukopenic, likely secondary to chemo, noted on 2/17 to 2.55. Mild further decline to 2.05 today. Patient remains afebrile, and has only had minimal episodes of diarrhea in the last 24 hours. Coughing is minimal.  She continues to have nausea and poor oral intake, though she has been able to ambulate around her room  Hyponatremic to 132, likely from poor PO intake    Plan:  Zofran 4 mg q8h prn for nausea  Follow-up with Dr. Castro outpatient for future chemo/immunotherapy planning

## 2025-02-20 NOTE — ASSESSMENT & PLAN NOTE
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 Last treatment on 2/13, planned to continue q3 weeks  Most recent CT head without contrast 2/3/2025: Evidence of parietal and occipital vasogenic edema without significant change from prior 12/27/2024  Currently on steroid taper with Decadron which started on 2/4, and is anticipated to end on Tuesday 2/25. Previous admission was complicated by symptoms of AMS with discontinuation of steroids  Takes Keppra 1000 mg BID for seizure prophylaxis    Plan:  Continue steroid taper with Decadron 2 mg BID with meals until 2/25/2025 or as instructed by Dr. Castro  Continue Keppra 1000 mg twice daily

## 2025-02-20 NOTE — ASSESSMENT & PLAN NOTE
History of pulmonary embolism which occurred in November 2024  Takes Xarelto 20 mg daily at home  Due to recurrent epistaxis, started on Eliquis 2.5 mg twice daily while hospitalized    Plan:  Given history of epistaxis, continue home Xarelto at a reduced dose per hematology-oncology's recommendations of 10 mg daily

## 2025-02-20 NOTE — DISCHARGE SUMMARY
Discharge Summary - Hospitalist   Name: Ayla Nye 74 y.o. female I MRN: [id number]  Unit/Bed#: S -01 I Date of Admission: 2/14/2025   Date of Service: 2/20/2025 I Hospital Day: 5     Assessment & Plan  Complication of chemotherapy/immunotherapy  Patient met severe sepsis criteria on arrival (tachycardia, fever 101 F at home, lactic acidosis 2.3, procalcitonin of 45) with unclear source.  Her last chemotherapy was on 2/13 with carboplatin, pemetrexed, and biologic Avastin  Treatments occurring regularly q3 weeks, often experiencing fevers, fatigue, and mild cough after treatment   Negative flu/COVID/RSV.  Negative strep pneumonia and Legionella.  CXR with no acute pathology.  Negative flu/COVID/RSV, strep pneumo and legionella urinary antigen negative  [place] UA  She was given 30 cc/kg per sepsis protocol  Her procalcitonin is trending down  2/16-patient was feeling nauseated with chills this morning.  At this point, her symptoms are likely chemotherapy-induced given no clear source of infection  2/17 Discontinued IV ceftriaxone   Leukopenic, likely secondary to chemo, noted on 2/17 to 2.55. Mild further decline to 2.05 today. Patient remains afebrile, and has only had minimal episodes of diarrhea in the last 24 hours. Coughing is minimal.  She continues to have nausea and poor oral intake, though she has been able to ambulate around her room  Hyponatremic to 132, likely from poor PO intake    Plan:  Zofran 4 mg q8h prn for nausea  Follow-up with Dr. 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 Last treatment on 2/13, planned to continue q3 weeks  Most recent CT head without contrast 2/3/2025: Evidence of parietal and occipital  vasogenic edema without significant change from prior 12/27/2024  Currently on steroid taper with Decadron which started on 2/4, and is anticipated to end on Tuesday 2/25. Previous admission was complicated by symptoms of AMS with discontinuation of steroids  Takes Keppra 1000 mg BID for seizure prophylaxis    Plan:  Continue steroid taper with Decadron 2 mg BID with meals until 2/25/2025 or as instructed by Dr. Castro  Continue Keppra 1000 mg twice daily    Acute respiratory failure with hypoxia (HCC)  Patient was tachypneic and desatting to 85% on 2/16, requiring 2L O2  Lung exam was clear.  CXR on admission with no acute pathology.  Likely anxiety with low suspicion for PE or pneumonia.  2/17 Saturating at 91-94% on RA, though desaturated to 89% in the evening  No additional episodes of desaturation occurred today, and she has been stable at 92-93% SpO2    History of Pneumocystis jirovecii pneumonia  Continue home Bactrim on MWF for PJP prophylaxis  Hypertension  Blood pressure has been elevated but stable in the 120s-150s/70s-80s    Plan:  Continue home amlodipine 5 mg daily.  GERD (gastroesophageal reflux disease)  Continue home Protonix 40 mg daily  Hypothyroidism  Continue home levothyroxine 50 mcg daily  History of pulmonary embolus (PE)  History of pulmonary embolism which occurred in November 2024  Takes Xarelto 20 mg daily at home  Due to recurrent epistaxis, started on Eliquis 2.5 mg twice daily while hospitalized    Plan:  Given history of epistaxis, continue home Xarelto at a reduced dose per hematology-oncology's recommendations of 10 mg daily  Anemia  Chronic anemia in the setting of malignancy.  Baseline hemoglobin (8-10)         Medical Problems       Resolved Problems  Date Reviewed: 1/29/2025          Resolved    Fever 2/15/2025     Resolved by  Nimco Patel DO    DILLON (acute kidney injury) (HCC) 2/16/2025     Resolved by  Fernanda Valentine MD        Discharging Physician / Practitioner: Nimco  DO Amanda  PCP: Rafy Angelo MD  Admission Date:   Admission Orders (From admission, onward)       Ordered        02/14/25 1124  INPATIENT ADMISSION  Once                          Discharge Date: 02/20/25    Consultations During Hospital Stay:  None    Procedures Performed:   None    Significant Findings / Test Results:   2/14 lactic acid elevated at 2.3  2/14 CMP with evidence of DILLON  Creatinine on arrival of 1.41 (baseline 0.8-1)  2/14 Procalcitonin elevated at 45.70, improved to 12.10 by 2/16 2/17 Negative C. Difficile and stool enteric bacterial panel    Test Results Pending at Discharge (will require follow up):   None     Outpatient Tests Requested:  None    Complications:  None    Reason for Admission: fever, fatigue, and mild cough    Hospital Course:   Ayla Nye is a 74 y.o. female PMHx of metastatic adenocarcinoma with brain metastases currently on chemotherapy and immunotherapy, PE in 11/2024, HTN, anemia, and GERD who originally presented to the hospital on 2/14/2025 due to fever, fatigue, and mild cough that began the night prior. Her last cancer treatment was on 2/13 with carboplatin, pemetrexed, and first trial of immunotherapy with Avastin planned for every 3 weeks. That same night, she felt feverish, with a temperature measured at 101 F at home, after which she developed a mild nonproductive cough (which she has had previously after chemotherapy).  In addition, she endorsed episodes of urinary incontinence that same night, but denied dysuria, straining, numbness, tingling.     In the ED, she was meeting criteria for severe sepsis with a fever, tachycardia, lactic acidosis of 2.3, and elevated procalcitonin of 45, though no source was able to be identified given a clear CXR in the setting of her cough. She was given PO tylenol, IV cefepime 2 g, IV vancomycin 1,250 mg, 2 L of fluid and initiated on maintenance fluid. Subsequent lactic acid and procalcitonin showed improvements to 1.7 and  23.18, respectively.    The following day, she continued to endorse the same complaints in addition to a headache, but denied nausea, vomiting, or diarrhea. Given her DRIP score of 4 (non-severe), abx were adjusted to IV ceftriaxone 1 g daily. Creatinine improved to 1.17, and she was no longer meeting criteria for DILLON.     On 2/16, she endorsed nausea, chills, vomiting, and diarrhea and was given Zofran. Stool samples were obtained for C. Difficile and a stool enteric panel. Because her symptoms did not improve after this, she was given a dose of Tigan. In addition, she was tachypneic in the morning, and desaturated to 85%, requiring 2 L of supplemental O2. Otherwise, labs continued to show no overt signs of infection.     Given that she continued to remain afebrile with improving labs and no identifiable source of infection (including negative C. Difficile and stool enteric panel) on 2/17, IV ceftriaxone was discontinued. Notably, her WBCs dropped to 2.55 from normal, likely 2/2 the effects of chemotherapy. She was saturating at 91-94% on room air, and had not required any supplemental oxygen overnight.     On 2/18, she reported a slight improvement in her symptoms, and denied abdominal pain, nausea, or vomiting, though she had one episode of diarrhea in the morning. After a discussion with heme/onc, it was decided to continue her home Xarelto at a reduced 10 mg dose at discharge due to recurrent episodes of epistaxis. Since she lives alone, and would not have a home health aid stopping by until 2/19, she remained in the hospital to ensure a safe discharge. In the meantime, she was encouraged to continue PO intake as tolerated and ambulate as able.     Please see above list of diagnoses and related plan for additional information.     Condition at Discharge: stable    Discharge Day Visit / Exam:   Subjective:  Ayla was seen and evaluated at the bedside. Today, she appeared fatigued, but denied any new  "complaints. She denied any episodes of vomiting or diarrhea for 24 hours, but continued to have persistent nausea.   Vitals: Blood Pressure: 130/77 (02/19/25 0718)  Pulse: 78 (02/19/25 0718)  Temperature: 98.2 °F (36.8 °C) (02/19/25 0718)  Temp Source: Oral (02/18/25 1513)  Respirations: 16 (02/19/25 0718)  Height: 5' 2\" (157.5 cm) (02/14/25 1838)  Weight - Scale: 58.1 kg (128 lb 1.4 oz) (02/14/25 1838)  SpO2: 93 % (02/19/25 0718)  Physical Exam  Vitals reviewed.   Constitutional:       General: She is not in acute distress.     Appearance: She is ill-appearing. She is not toxic-appearing.   HENT:      Head: Normocephalic and atraumatic.      Mouth/Throat:      Mouth: Mucous membranes are dry.      Pharynx: Oropharynx is clear.   Eyes:      General: No scleral icterus.     Extraocular Movements: Extraocular movements intact.      Conjunctiva/sclera: Conjunctivae normal.   Cardiovascular:      Rate and Rhythm: Normal rate and regular rhythm.      Heart sounds: Normal heart sounds.   Pulmonary:      Effort: Pulmonary effort is normal.      Breath sounds: Normal breath sounds.   Abdominal:      General: There is no distension.      Palpations: Abdomen is soft.      Tenderness: There is no abdominal tenderness. There is no guarding.   Musculoskeletal:      Right lower leg: No edema.      Left lower leg: No edema.   Skin:     General: Skin is warm and dry.      Coloration: Skin is pale.   Neurological:      General: No focal deficit present.      Mental Status: She is oriented to person, place, and time and easily aroused. Mental status is at baseline. She is lethargic.   Psychiatric:         Mood and Affect: Mood normal.         Behavior: Behavior normal. Behavior is cooperative.          Discussion with Family: Attempted to update  (daughter) via phone. Unable to contact.    Discharge instructions/Information to patient and family:   See after visit summary for information provided to patient and family.  "     Provisions for Follow-Up Care:  See after visit summary for information related to follow-up care and any pertinent home health orders.      Mobility at time of Discharge:   Basic Mobility Inpatient Raw Score: 18  JH-HLM Goal: 6: Walk 10 steps or more  JH-HLM Achieved: 6: Walk 10 steps or more  HLM Goal achieved. Continue to encourage appropriate mobility.     Disposition:   Home with home health care    Planned Readmission: No    Discharge Medications:  See after visit summary for reconciled discharge medications provided to patient and/or family.      Administrative Statements   Discharge Statement:  I have spent a total time of 35 minutes in caring for this patient on the day of the visit/encounter. .    **Please Note: This note may have been constructed using a voice recognition system**

## 2025-02-20 NOTE — ASSESSMENT & PLAN NOTE
Patient was tachypneic and desatting to 85% on 2/16, requiring 2L O2  Lung exam was clear.  CXR on admission with no acute pathology.  Likely anxiety with low suspicion for PE or pneumonia.  2/17 Saturating at 91-94% on RA, though desaturated to 89% in the evening  No additional episodes of desaturation occurred today, and she has been stable at 92-93% SpO2

## 2025-02-20 NOTE — ASSESSMENT & PLAN NOTE
Chronic anemia in the setting of malignancy.  Baseline hemoglobin (8-10)       [facility]: [facility]  PROGRESS NOTE      Madalyn Ricci is a 81 y.o. female that is being seen  today for follow-up at [facility].  No chief complaint on file..  Subjective   Patient is being seen for follow-up of [facility].  Patient's blood pressure has been better controlled.  Blood sugars have been stable.  No recent falls or episodes of agitation.  Patient is usually in the wheelchair since she has difficulty with walking.  Denies any other symptoms.      ROS  Negative for fever or chills  Negative for sore throat, ear pain, nasal discharge  Negative for cough, shortness of breath or wheezing  Negative for chest pain, palpitations, swelling of legs  Negative for abdominal pain, constipation, diarrhea, blood in the stools  Negative for urinary complaints  Negative for headache, dizziness, weakness or numbness  Negative for joint pain.  Positive for difficulty in walking  Positive for anxiety  All other systems reviewed and were negative   Vitals:    10/19/23 0500   BP: (!) 128/93   Pulse: 84   Resp: 17   Temp: 36.1 °C (97 °F)   SpO2: 99%      Vitals:    10/18/23 1749   Weight: 127 kg (278 lb 15.9 oz)     Body mass index is 47.86 kg/m².  Physical Exam  Constitutional: Patient does not appear to be in any acute distress  Head and Face: NCAT  Eyes: Normal external exam, EOMI  ENT: Normal external inspection of ears and nose. Oropharynx normal.  Cardiovascular: RRR, S1/S2, no murmurs, rubs, or gallops, radial pulses +2, no edema of extremities  Pulmonary: CTAB, no respiratory distress.  Abdomen: +BS, soft, non-tender, nondistended, no guarding or rebound, no masses noted  MSK: No joint swelling, normal movements of all extremities. Range of motion- normal.  Skin- No lesions, contusions, or erythema.  Peripheral puslses palpable bilaterally 2+  Neuro: AAO X3, Cranial nerves 2-12 grossly intact,DTR 2+ in all 4 limbs   Psychiatric: Judgment intact. Appropriate mood and behavior    Diagnostic  Results   Lab Results   Component Value Date    GLUCOSE 199 (H) 10/16/2023    CALCIUM 9.3 10/16/2023     10/16/2023    K 3.7 10/16/2023    CO2 26 10/16/2023     (H) 10/16/2023    BUN 31 (H) 10/16/2023    CREATININE 1.20 10/16/2023     Lab Results   Component Value Date    ALT 11 10/16/2023    AST 16 10/16/2023    ALKPHOS 74 10/16/2023    BILITOT 0.3 10/16/2023     Lab Results   Component Value Date    WBC 6.4 10/16/2023    HGB 14.1 10/16/2023    HCT 44.1 10/16/2023    MCV 90 10/16/2023     10/16/2023     Lab Results   Component Value Date    CHOL 138 06/26/2020    CHOL 142 09/20/2019    CHOL 131 (L) 03/05/2019     Lab Results   Component Value Date    HDL 36 (L) 06/26/2020    HDL 36 (L) 09/20/2019    HDL 37 (L) 03/05/2019     Lab Results   Component Value Date    LDLCALC 44 (L) 06/26/2020    LDLCALC 36 (L) 09/20/2019    LDLCALC 49 (L) 03/05/2019     Lab Results   Component Value Date    TRIG 291 (H) 06/26/2020    TRIG 350 (H) 09/20/2019    TRIG 224 (H) 03/05/2019     Lab Results   Component Value Date    HGBA1C 6.4 (H) 12/12/2020     Other labs not included in the list above were reviewed either before or during this encounter.    History    Past Medical History:   Diagnosis Date    Diabetes mellitus (CMS/HCC)     Hypertension      Past Surgical History:   Procedure Laterality Date    APPENDECTOMY  10/13/2015    Appendectomy    CATARACT EXTRACTION  10/13/2015    Cataract Surgery    CHOLECYSTECTOMY  10/13/2015    Cholecystectomy    CT GUIDED IMAGING FOR NEEDLE PLACEMENT  2/4/2015    CT GUIDED IMAGING FOR NEEDLE PLACEMENT [facility]    HEMICOLECTOMY  10/13/2015    Hemicolectomy    HYSTERECTOMY  10/13/2015    Hysterectomy    LUNG LOBECTOMY  10/13/2015    Lung Lobectomy     No family history on file.  No Known Allergies  No current facility-administered medications on file prior to encounter.     Current Outpatient Medications on File Prior to Encounter   Medication Sig Dispense Refill     "amLODIPine (Norvasc) 10 mg tablet Take 1 tablet (10 mg) by mouth once daily.      atorvastatin (Lipitor) 40 mg tablet Take 1 tablet (40 mg) by mouth once daily.      carvedilol (Coreg) 25 mg tablet Take 1 tablet (25 mg) by mouth 2 times a day with meals.      [] cefdinir (Omnicef) 300 mg capsule Take 1 capsule (300 mg) by mouth 2 times a day for 7 days. 14 capsule 0    icosapent ethyL (Vascepa) 1 gram capsule Take 1 capsule (1 g) by mouth 2 times a day with meals.      insulin aspart (NovoLOG U-100 Insulin aspart) 100 unit/mL injection Inject 100 Units under the skin 3 times a day before meals. Take as directed per insulin instructions.      insulin degludec (Tresiba FlexTouch U-200) 200 unit/mL (3 mL) injection Inject 40 Units under the skin once daily at bedtime. Take as directed per insulin instructions.      latanoprost (Xalatan) 0.005 % ophthalmic solution Administer 1 drop into both eyes once daily at bedtime.      losartan (Cozaar) 50 mg tablet Take 2 tablets (100 mg) by mouth once daily.      magnesium oxide (Mag-Ox) 400 mg tablet Take 1 tablet (400 mg) by mouth once daily.      omeprazole (PriLOSEC) 40 mg DR capsule Take 1 capsule (40 mg) by mouth once daily in the morning. Take before meals. Do not crush or chew.      pen needle, diabetic (BD Traci 2nd Gen Pen Needle) 32 gauge x 5/32\" needle       potassium chloride ER (Micro-K) 10 mEq ER capsule Take 2 capsules (20 mEq) by mouth once daily. Do not crush or chew.      venlafaxine XR (Effexor-XR) 150 mg 24 hr capsule Take 1 capsule (150 mg) by mouth once daily. Do not crush or chew.      [DISCONTINUED] ferrous sulfate 325 (65 Fe) MG EC tablet Take 65 mg by mouth 3 times a day with meals. Do not crush, chew, or split.      [DISCONTINUED] fluticasone propion-salmeteroL (Advair) 115-21 mcg/actuation inhaler Inhale 2 puffs 2 times a day. Rinse mouth with water after use to reduce aftertaste and incidence of candidiasis. Do not swallow.      [DISCONTINUED] " hydroCHLOROthiazide (HYDRODiuril) 50 mg tablet Take 1 tablet (50 mg) by mouth once daily.      [DISCONTINUED] vitamin E 450 mg (1000 unit) capsule Take 100 Units by mouth once daily.         There is no immunization history on file for this patient.  Patient's medical history was reviewed and updated either before or during this encounter.  ASSESSMENT / PLAN:  Active Hospital Problems    Hyperlipidemia      GERD (gastroesophageal reflux disease)      Type 2 diabetes mellitus without complication, with long-term current use of insulin (CMS/[facility])      Primary hypertension      *Mood disorder (CMS/[facility])    Patient's blood pressure is fairly controlled.  Blood sugars have been fairly controlled.  Denies any significant complaints.  Patient has elevated triglycerides.  Patient is on statins.  Patient is being seen by geropsych team.        Osiel Forman MD

## 2025-02-21 ENCOUNTER — TELEPHONE (OUTPATIENT)
Age: 75
End: 2025-02-21

## 2025-02-21 LAB
DME PARACHUTE DELIVERY DATE ACTUAL: NORMAL
DME PARACHUTE DELIVERY DATE REQUESTED: NORMAL
DME PARACHUTE ITEM DESCRIPTION: NORMAL
DME PARACHUTE ORDER STATUS: NORMAL
DME PARACHUTE SUPPLIER NAME: NORMAL
DME PARACHUTE SUPPLIER PHONE: NORMAL

## 2025-02-21 NOTE — TELEPHONE ENCOUNTER
Pt stated she was discharged from the hospital on 2/19/25. Pt no longer decided to hold as a nurse was not available at this time.  Please contact pt and schedule a follow up appt. Thank you for your help.

## 2025-02-24 DIAGNOSIS — Z86.711 HISTORY OF PULMONARY EMBOLUS (PE): Primary | ICD-10-CM

## 2025-02-24 NOTE — TELEPHONE ENCOUNTER
Patient called the office stated for a new prescription for Xarelto. Patient was discharged from the hospital two days ago and was sent home with Xarelto 20 mg but takes 0.5 tablet(10mg) once in the morning. Patient stated her insurance will NOT COVER 20MG. Patient needs a new prescription stating 10mg she takes one tablet in the morning.

## 2025-02-27 ENCOUNTER — OFFICE VISIT (OUTPATIENT)
Age: 75
End: 2025-02-27
Payer: MEDICARE

## 2025-02-27 VITALS
TEMPERATURE: 97.2 F | OXYGEN SATURATION: 99 % | DIASTOLIC BLOOD PRESSURE: 70 MMHG | SYSTOLIC BLOOD PRESSURE: 110 MMHG | RESPIRATION RATE: 18 BRPM | WEIGHT: 113 LBS | BODY MASS INDEX: 20.8 KG/M2 | HEART RATE: 97 BPM | HEIGHT: 62 IN

## 2025-02-27 DIAGNOSIS — K21.9 GASTROESOPHAGEAL REFLUX DISEASE WITHOUT ESOPHAGITIS: ICD-10-CM

## 2025-02-27 DIAGNOSIS — E03.9 ACQUIRED HYPOTHYROIDISM: ICD-10-CM

## 2025-02-27 DIAGNOSIS — E78.2 MIXED HYPERLIPIDEMIA: ICD-10-CM

## 2025-02-27 DIAGNOSIS — I10 PRIMARY HYPERTENSION: Primary | ICD-10-CM

## 2025-02-27 PROBLEM — H53.9 VISUAL DISTURBANCE: Status: RESOLVED | Noted: 2024-07-16 | Resolved: 2025-02-27

## 2025-02-27 PROBLEM — J96.01 ACUTE RESPIRATORY FAILURE WITH HYPOXIA (HCC): Status: RESOLVED | Noted: 2024-11-29 | Resolved: 2025-02-27

## 2025-02-27 PROBLEM — N18.32 CHRONIC KIDNEY DISEASE, STAGE 3B (HCC): Status: RESOLVED | Noted: 2024-02-22 | Resolved: 2025-02-27

## 2025-02-27 PROCEDURE — 99215 OFFICE O/P EST HI 40 MIN: CPT

## 2025-02-27 PROCEDURE — G2211 COMPLEX E/M VISIT ADD ON: HCPCS

## 2025-02-27 NOTE — ASSESSMENT & PLAN NOTE
Lab Results   Component Value Date    EGFR 76 02/19/2025    EGFR 76 02/18/2025    EGFR 66 02/17/2025    CREATININE 0.77 02/19/2025    CREATININE 0.77 02/18/2025    CREATININE 0.86 02/17/2025

## 2025-02-27 NOTE — PROGRESS NOTES
Name: Ayla Nye      : 1950      MRN: [id number]  Encounter Provider: Rafy Angelo MD  Encounter Date: 2025   Encounter department: [facility] PR[facility]ARE  :  Assessment & Plan  Primary hypertension  Under control.  Continue amlodipine 5 mg po qd  We will continue to monitor       Mixed hyperlipidemia  Under control without medication we will continue to monitor         Acquired hypothyroidism  TSH in therapeutic range.  Repeat TSH in about 2 months.  Continue same dose of levothyroxine.  We will continue to monitor         Gastroesophageal reflux disease without esophagitis  Under control.    Continue current medication.    We will re-evaluate at next office visit.                History of Present Illness   Patient here supposed to have a transitional care management but no transitional care management phone call was done.  Patient was admitted to [facility] on 2025 then discharged on 2025 with chemotherapy related complication which gave her nausea vomiting and diarrhea and sepsis patient slowly but steadily recover patient still gets exertional shortness of breath no chest pain no fever or chills no lightheadedness or dizziness no more diarrhea, nausea or vomiting.  No headaches.  No tingling or muscle weakness.  No other bowel or bladder problem.  Patient has follow-up appointment with oncologist to discuss the chemotherapy plan because every time she gets chemotherapy infusion she ends up in the hospital with the fever and some time with the diarrhea nausea and vomiting  All the records from the hospital reviewed      Review of Systems   Constitutional:  Positive for fatigue. Negative for chills and fever.   HENT:  Negative for congestion, ear pain, rhinorrhea, sneezing and sore throat.    Eyes:  Negative for redness, itching and visual disturbance.   Respiratory:  Positive for shortness of breath. Negative for cough  "and chest tightness.    Cardiovascular:  Negative for chest pain, palpitations and leg swelling.   Gastrointestinal:  Negative for abdominal pain, blood in stool, diarrhea, nausea and vomiting.   Endocrine: Negative for cold intolerance and heat intolerance.   Genitourinary:  Negative for dysuria, frequency and urgency.   Musculoskeletal:  Negative for arthralgias, back pain and myalgias.   Skin:  Negative for color change and rash.   Neurological:  Negative for dizziness, weakness, light-headedness, numbness and headaches.   Hematological:  Does not bruise/bleed easily.   Psychiatric/Behavioral:  Negative for agitation, behavioral problems and confusion.        Objective   /70 (BP Location: Right arm, Patient Position: Sitting, Cuff Size: Standard)   Pulse 97   Temp (!) 97.2 °F (36.2 °C) (Temporal)   Resp 18   Ht 5' 2\" (1.575 m)   Wt 51.3 kg (113 lb)   SpO2 99%   BMI 20.67 kg/m²      Physical Exam  Vitals and nursing note reviewed.   Constitutional:       General: She is not in acute distress.     Appearance: Normal appearance. She is well-developed. She is not ill-appearing, toxic-appearing or diaphoretic.   HENT:      Head: Normocephalic and atraumatic.      Right Ear: External ear normal.      Left Ear: External ear normal.      Nose: Nose normal.      Mouth/Throat:      Mouth: Mucous membranes are moist.      Pharynx: Oropharynx is clear. No posterior oropharyngeal erythema.   Eyes:      General: No scleral icterus.        Right eye: No discharge.         Left eye: No discharge.      Extraocular Movements: Extraocular movements intact.      Conjunctiva/sclera: Conjunctivae normal.      Pupils: Pupils are equal, round, and reactive to light.   Cardiovascular:      Rate and Rhythm: Normal rate and regular rhythm.      Pulses: Normal pulses.      Heart sounds: Normal heart sounds. No murmur heard.     No gallop.   Pulmonary:      Effort: Pulmonary effort is normal. No respiratory distress.      Breath " sounds: No wheezing or rales.      Comments: Distant breath sounds  Abdominal:      General: Abdomen is flat. Bowel sounds are normal. There is no distension.      Palpations: Abdomen is soft.      Tenderness: There is no abdominal tenderness. There is no right CVA tenderness, left CVA tenderness or guarding.   Musculoskeletal:         General: No swelling or tenderness. Normal range of motion.      Cervical back: Normal range of motion and neck supple. No rigidity.      Right lower leg: No edema.      Left lower leg: No edema.   Lymphadenopathy:      Cervical: No cervical adenopathy.   Skin:     General: Skin is warm and dry.      Capillary Refill: Capillary refill takes 2 to 3 seconds.      Coloration: Skin is not jaundiced or pale.      Findings: No bruising.   Neurological:      General: No focal deficit present.      Mental Status: She is alert and oriented to person, place, and time. Mental status is at baseline.      Sensory: No sensory deficit.      Gait: Gait normal.   Psychiatric:         Mood and Affect: Mood normal.         Behavior: Behavior normal.

## 2025-03-03 ENCOUNTER — APPOINTMENT (OUTPATIENT)
Dept: LAB | Facility: CLINIC | Age: 75
End: 2025-03-03
Payer: MEDICARE

## 2025-03-03 ENCOUNTER — OFFICE VISIT (OUTPATIENT)
Dept: HEMATOLOGY ONCOLOGY | Facility: CLINIC | Age: 75
End: 2025-03-03
Payer: MEDICARE

## 2025-03-03 ENCOUNTER — TELEPHONE (OUTPATIENT)
Age: 75
End: 2025-03-03

## 2025-03-03 ENCOUNTER — TELEPHONE (OUTPATIENT)
Dept: HEMATOLOGY ONCOLOGY | Facility: CLINIC | Age: 75
End: 2025-03-03

## 2025-03-03 VITALS
BODY MASS INDEX: 21.35 KG/M2 | WEIGHT: 116 LBS | TEMPERATURE: 97.3 F | SYSTOLIC BLOOD PRESSURE: 116 MMHG | DIASTOLIC BLOOD PRESSURE: 70 MMHG | RESPIRATION RATE: 16 BRPM | OXYGEN SATURATION: 99 % | HEIGHT: 62 IN | HEART RATE: 90 BPM

## 2025-03-03 DIAGNOSIS — C79.9 METASTATIC ADENOCARCINOMA (HCC): ICD-10-CM

## 2025-03-03 DIAGNOSIS — C34.11 MALIGNANT NEOPLASM OF UPPER LOBE, RIGHT BRONCHUS OR LUNG (HCC): Primary | ICD-10-CM

## 2025-03-03 DIAGNOSIS — Z86.711 HISTORY OF PULMONARY EMBOLUS (PE): ICD-10-CM

## 2025-03-03 DIAGNOSIS — C79.31 MALIGNANT NEOPLASM METASTATIC TO BRAIN (HCC): ICD-10-CM

## 2025-03-03 DIAGNOSIS — R39.9 UTI SYMPTOMS: Primary | ICD-10-CM

## 2025-03-03 DIAGNOSIS — Z86.19 HISTORY OF PNEUMOCYSTIS JIROVECII PNEUMONIA: ICD-10-CM

## 2025-03-03 DIAGNOSIS — R39.9 UTI SYMPTOMS: ICD-10-CM

## 2025-03-03 DIAGNOSIS — C34.11 MALIGNANT NEOPLASM OF UPPER LOBE, RIGHT BRONCHUS OR LUNG (HCC): ICD-10-CM

## 2025-03-03 DIAGNOSIS — D47.3 ESSENTIAL THROMBOCYTOSIS (HCC): ICD-10-CM

## 2025-03-03 DIAGNOSIS — R04.0 EPISTAXIS: ICD-10-CM

## 2025-03-03 LAB
ALBUMIN SERPL BCG-MCNC: 3.8 G/DL (ref 3.5–5)
ALP SERPL-CCNC: 58 U/L (ref 34–104)
ALT SERPL W P-5'-P-CCNC: 12 U/L (ref 7–52)
ANION GAP SERPL CALCULATED.3IONS-SCNC: 11 MMOL/L (ref 4–13)
AST SERPL W P-5'-P-CCNC: 12 U/L (ref 13–39)
BACTERIA UR QL AUTO: ABNORMAL /HPF
BILIRUB SERPL-MCNC: 0.17 MG/DL (ref 0.2–1)
BILIRUB UR QL STRIP: NEGATIVE
BUN SERPL-MCNC: 18 MG/DL (ref 5–25)
CALCIUM SERPL-MCNC: 9.4 MG/DL (ref 8.4–10.2)
CHLORIDE SERPL-SCNC: 105 MMOL/L (ref 96–108)
CLARITY UR: CLEAR
CO2 SERPL-SCNC: 24 MMOL/L (ref 21–32)
COLOR UR: YELLOW
CREAT SERPL-MCNC: 1.1 MG/DL (ref 0.6–1.3)
CRP SERPL QL: 69.8 MG/L
ERYTHROCYTE [DISTWIDTH] IN BLOOD BY AUTOMATED COUNT: 16.6 % (ref 11.6–15.1)
ERYTHROCYTE [SEDIMENTATION RATE] IN BLOOD: 81 MM/HOUR (ref 0–29)
GFR SERPL CREATININE-BSD FRML MDRD: 49 ML/MIN/1.73SQ M
GLUCOSE SERPL-MCNC: 94 MG/DL (ref 65–140)
GLUCOSE UR STRIP-MCNC: NEGATIVE MG/DL
HCT VFR BLD AUTO: 30.5 % (ref 34.8–46.1)
HGB BLD-MCNC: 9.3 G/DL (ref 11.5–15.4)
HGB UR QL STRIP.AUTO: ABNORMAL
KETONES UR STRIP-MCNC: NEGATIVE MG/DL
LDH SERPL-CCNC: 372 U/L (ref 140–271)
LEUKOCYTE ESTERASE UR QL STRIP: NEGATIVE
LIPASE SERPL-CCNC: 48 U/L (ref 11–82)
MAGNESIUM SERPL-MCNC: 1.9 MG/DL (ref 1.9–2.7)
MCH RBC QN AUTO: 29 PG (ref 26.8–34.3)
MCHC RBC AUTO-ENTMCNC: 30.5 G/DL (ref 31.4–37.4)
MCV RBC AUTO: 95 FL (ref 82–98)
MUCOUS THREADS UR QL AUTO: ABNORMAL
NITRITE UR QL STRIP: NEGATIVE
NON-SQ EPI CELLS URNS QL MICRO: ABNORMAL /HPF
PH UR STRIP.AUTO: 5.5 [PH]
PLATELET # BLD AUTO: 595 THOUSANDS/UL (ref 149–390)
PMV BLD AUTO: 8.8 FL (ref 8.9–12.7)
POTASSIUM SERPL-SCNC: 4.4 MMOL/L (ref 3.5–5.3)
PROT SERPL-MCNC: 7.1 G/DL (ref 6.4–8.4)
PROT UR STRIP-MCNC: ABNORMAL MG/DL
RBC # BLD AUTO: 3.21 MILLION/UL (ref 3.81–5.12)
RBC #/AREA URNS AUTO: ABNORMAL /HPF
SODIUM SERPL-SCNC: 140 MMOL/L (ref 135–147)
SP GR UR STRIP.AUTO: 1.02 (ref 1–1.03)
T3FREE SERPL-MCNC: 2.56 PG/ML (ref 2.5–3.9)
TSH SERPL DL<=0.05 MIU/L-ACNC: 0.56 UIU/ML (ref 0.45–4.5)
UROBILINOGEN UR STRIP-ACNC: <2 MG/DL
WBC # BLD AUTO: 8.3 THOUSAND/UL (ref 4.31–10.16)
WBC #/AREA URNS AUTO: ABNORMAL /HPF

## 2025-03-03 PROCEDURE — 84443 ASSAY THYROID STIM HORMONE: CPT

## 2025-03-03 PROCEDURE — 36415 COLL VENOUS BLD VENIPUNCTURE: CPT

## 2025-03-03 PROCEDURE — 86140 C-REACTIVE PROTEIN: CPT

## 2025-03-03 PROCEDURE — 85652 RBC SED RATE AUTOMATED: CPT

## 2025-03-03 PROCEDURE — 83690 ASSAY OF LIPASE: CPT

## 2025-03-03 PROCEDURE — 83615 LACTATE (LD) (LDH) ENZYME: CPT

## 2025-03-03 PROCEDURE — 83735 ASSAY OF MAGNESIUM: CPT

## 2025-03-03 PROCEDURE — 84481 FREE ASSAY (FT-3): CPT

## 2025-03-03 PROCEDURE — 85027 COMPLETE CBC AUTOMATED: CPT

## 2025-03-03 PROCEDURE — 81001 URINALYSIS AUTO W/SCOPE: CPT

## 2025-03-03 PROCEDURE — 85007 BL SMEAR W/DIFF WBC COUNT: CPT

## 2025-03-03 PROCEDURE — 80053 COMPREHEN METABOLIC PANEL: CPT

## 2025-03-03 PROCEDURE — 99215 OFFICE O/P EST HI 40 MIN: CPT | Performed by: INTERNAL MEDICINE

## 2025-03-03 RX ORDER — DEXAMETHASONE 2 MG/1
2 TABLET ORAL 2 TIMES DAILY WITH MEALS
COMMUNITY
End: 2025-03-13 | Stop reason: SDUPTHER

## 2025-03-03 NOTE — ASSESSMENT & PLAN NOTE
History of JAK2 positive thrombocytosis had required hydroxyurea in the past.  Her platelet counts have trended down likely in setting of chemotherapy.  Recent CBC now with thrombocytopenia

## 2025-03-03 NOTE — PROGRESS NOTES
Name: Ayla Nye      : 1950      MRN: [id number]  Encounter Provider: Rosalinda Castro MD  Encounter Date: 3/3/2025   Encounter department: [facility]  :  Assessment & Plan  Malignant neoplasm of upper lobe, right bronchus or lung (HCC)  [id number] (IIIB) lung adenocarcinoma of the right upper lobe, s/p concurrent chemoRT completing on 24. She completed a course of SRS on 24 after MRI brain demonstrated five supra- and infratentorial enhancing lesions compatible with metastases. She also completed a course of palliative radiation to right gluteus (for superficial squamous cell lesion) on 10/18/24.   Patient is currently reduced dose carboplatin AUC 4, pemetrexed 300 mg/m² and Avastin 5 mg/kg that was added with last cycle she received on 2025.  Despite dose reduction she did not tolerated therapy very well and was admitted to hospital from - for high-grade fever, nausea and generalized weakness.  Infectious workup was negative.  Patient is planned for next treatment on 3/6/2025, however we will hold on upcoming treatment due to ongoing weakness.  Will repeat CBC and CMP to rule out cytopenia versus electrolyte abnormalities attributing to her symptoms.  Pending lab work and imaging, will likely consider switching chemotherapy to Taxotere versus further dose reduction in current regimen.    Patient will be seen in office in 2 weeks, she was again encouraged to call office in 1 week to inform about her symptoms as he have uptitrated her Decadron.    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  Currently patient is on her taper for Decadron and is on 2 mg twice daily with frequent  headaches and significant weakness.  Will titrate up Decadron 4 mg in a.m. and 2 mg in p.m.  Encouraged to call office in 1 week, as we will need to adjust dose for Decadron accordingly.       Essential thrombocytosis (HCC)  History of JAK2 positive thrombocytosis had required hydroxyurea in the past.  Her platelet counts have trended down likely in setting of chemotherapy.  Recent CBC now with thrombocytopenia       Epistaxis  Was evaluated by ENT, had cauterization but stopped bleeding for a while however recently noted with recurrent epistaxis.  Her Xarelto was reduced from 20 to 10 mg during recent hospitalization.  Will hold Avastin.       History of pulmonary embolus (PE)  Was admitted in hospital in November for shortness of breath, found to have pulmonary embolism, started on Xarelto 20 mg daily however due to recurrent epistaxis dose was decreased to 10 mg during her recent hospitalization.  She complains of worsening shortness of breath and is taking with exertion, will repeat CTA chest to evaluate for any acute PE.     Orders:    CTA chest pe study; Future    History of Pneumocystis jirovecii pneumonia  Patient continues on Bactrim 1 tablet 3 times a week.           Return in about 2 weeks (around 3/17/2025) for Office Visit, Labs - See Treatment Plan.    History of Present Illness   Chief Complaint   Patient presents with    Follow-up     Ayla Nye is a 74 y.o. female with medical hx remarkable of HTN, HLD, Nodular Goiter, Osteoprosis, Psoriasis, essential thrombocytosis, Pulmonary Embolism, lung adenocarcinoma as outlined below, and had superficial perianal mass biopsy showed squamous cell carcinoma s/p R gluteus radiation therapy completed 10/18/2024 .      History of [id number] (IIIB) lung adenocarcinoma of the right upper lobe, s/p concurrent chemoRT completing on 7/5/24. She completed a course of SRS on 8/8/24 after MRI brain demonstrated five supra- and infratentorial enhancing lesions  compatible with metastases. She also completed a course of palliative radiation to right gluteus (for superficial squamous cell lesion) on 10/18/24.      Unfortunately her therapy has been interrupted due to frequent hospitalization, despite dose reduction.    Last chemotherapy on 2/13/2025 when she received carboplatin AUC 4 reduced from AUC 5, Pemetrexed 300 mg/m² which is reduced from 500 mg/m², bevacizumab 5 mg/kg, unfortunately developed fever, nausea and generalized weakness needing hospitalization again from 2/14 to 2/19 where she made SIRS criteria and was started on broad-spectrum antibiotics however infectious workup was negative.    Oncology History   Cancer Staging   Metastatic adenocarcinoma (HCC)  Staging form: Lung, AJCC 8th Edition  - Clinical stage from 4/15/2024: Stage IIIB (cT2b, cN3, cM0) - Signed by Rogelio Beebe DO on 4/15/2024  - Clinical: Stage IV (cM1) - Signed by Honey Gamboa MD on 9/23/2024  Oncology History   Metastatic adenocarcinoma (HCC)   2024 Initial Diagnosis    Primary lung adenocarcinoma, right (HCC)     3/26/2024 Biopsy    A-C. Lymph Node, Level 4R :    - Metastatic non-small cell carcinoma, most compatible with a primary lung adenocarcinoma; see note.       D-F. Lymph Node, Level 10R:    - Metastatic non-small cell carcinoma; see note.       G-I. Lymph Node, Level 4L:    - Metastatic non-small cell carcinoma; see note.       4/15/2024 -  Cancer Staged    Staging form: Lung, AJCC 8th Edition  - Clinical stage from 4/15/2024: Stage IIIB (cT2b, cN3, cM0) - Signed by Rogelio Beebe DO on 4/15/2024       5/23/2024 - 7/2/2024 Chemotherapy    alteplase (CATHFLO), 2 mg, Intracatheter, Every 1 Minute as needed, 6 of 6 cycles  CARBOplatin (PARAPLATIN) IVPB (GOG AUC DOSING), 130.4 mg, Intravenous, Once, 6 of 6 cycles  Administration: 130.4 mg (5/23/2024), 144.8 mg (5/30/2024), 138.4 mg (6/6/2024), 144.8 mg (6/13/2024), 132 mg (6/21/2024), 143.6 mg  (7/2/2024)  PACLItaxel (TAXOL) chemo IVPB, 45 mg/m2 = 67.2 mg (90 % of original dose 50 mg/m2), Intravenous, Once, 6 of 6 cycles  Dose modification: 45 mg/m2 (original dose 50 mg/m2, Cycle 1, Reason: Anticipated Tolerance)  Administration: 67.2 mg (5/23/2024), 67.2 mg (5/30/2024), 67.2 mg (6/6/2024), 67.2 mg (6/13/2024), 67.2 mg (6/21/2024), 67.2 mg (7/2/2024)     5/23/2024 - 7/5/2024 Radiation    Treatment:  Course: C1    Plan ID Energy Fractions Dose per Fraction (cGy) Dose Correction (cGy) Total Dose Delivered (cGy) Elapsed Days   R Lung_Hilum 6X 30 / 30 200 0 6,000 43      Treatment dates:  C1: 5/23/2024 - 7/5/2024 8/8/2024 - 8/8/2024 Radiation    SRS 5 PTVs   2000 cGy     9/12/2024 Biopsy    Mass, Superficial perianal mass:  - Squamous cell carcinoma.     Note: The patient's prior lung EBUS sampling shows the tumor to stain for TTF1 and Napsin with absent p40 expression.  The current perianal mass sampling shows diffuse p40 expression with absent TTF1 expression.  This may represent a primary anal/perianal squamous cell carcinoma versus a possible metastatic combined lung tumor (adenocarcinoma and previously unsampled squamous component).  Suggest clinical correlation and appropriate follow-up.         9/23/2024 -  Cancer Staged    Staging form: Lung, AJCC 8th Edition  - Clinical: Stage IV (cM1) - Signed by Honey Gamboa MD on 9/23/2024       10/1/2024 - 10/18/2024 Radiation    Course: C3    Plan ID Energy Fractions Dose per Fraction (cGy) Dose Correction (cGy) Total Dose Delivered (cGy) Elapsed Days   R Gluteus:1 10X/6X 14 / 15 300 0 4,200 17      Treatment Dates:  10/1/2024 - 10/18/2024.       10/7/2024 -  Chemotherapy    cyanocobalamin, 1,000 mcg, Intramuscular, Once, 2 of 3 cycles  Administration: 1,000 mcg (8/19/2024), 1,000 mcg (1/23/2025)  alteplase (CATHFLO), 2 mg, Intracatheter, Every 1 Minute as needed, 4 of 6 cycles  palonosetron (ALOXI), 0.25 mg, Intravenous, Once, 2 of 4  cycles  Administration: 0.25 mg (1/23/2025), 0.25 mg (2/13/2025)  fosaprepitant (EMEND) IVPB, 150 mg, Intravenous, Once, 4 of 6 cycles  Administration: 150 mg (10/7/2024), 150 mg (1/23/2025), 150 mg (2/13/2025), 150 mg (11/14/2024)  CARBOplatin (PARAPLATIN) IVPB ([facility] [place]), 347 mg, Intravenous, Once, 4 of 6 cycles  Administration: 347 mg (10/7/2024), 256.8 mg (1/23/2025), 278.4 mg (2/13/2025), 346 mg (11/14/2024)  bevacizumab (AVASTIN) IVPB, 5 mg/kg = 280 mg (100 % of original dose 5 mg/kg), Intravenous, Once, 1 of 3 cycles  Dose modification: 5 mg/kg (original dose 5 mg/kg, Cycle 4, Reason: Anticipated Tolerance)  Administration: 280 mg (2/13/2025)  pemetrexed (ALIMTA) chemo infusion, 735 mg, Intravenous, Once, 4 of 6 cycles  Dose modification: 300 mg/m2 (original dose 500 mg/m2, Cycle 4, Reason: Anticipated Tolerance)  Administration: 700 mg (10/7/2024), 700 mg (1/23/2025), 468 mg (2/13/2025), 700 mg (11/14/2024)  pembrolizumab (KEYTRUDA) IVPB, 200 mg, Intravenous, Once, 2 of 2 cycles  Administration: 200 mg (11/14/2024), 200 mg (10/7/2024)     Metastatic cancer to brain (HCC)   7/29/2024 Initial Diagnosis    Metastatic cancer to brain (HCC)     8/8/2024 - 8/8/2024 Radiation    SRS 5 PTVs   2000 cGy     9/12/2024 Biopsy    Mass, Superficial perianal mass:  - Squamous cell carcinoma.     Note: The patient's prior lung EBUS sampling shows the tumor to stain for TTF1 and Napsin with absent p40 expression.  The current perianal mass sampling shows diffuse p40 expression with absent TTF1 expression.  This may represent a primary anal/perianal squamous cell carcinoma versus a possible metastatic combined lung tumor (adenocarcinoma and previously unsampled squamous component).  Suggest clinical correlation and appropriate follow-up.         10/1/2024 - 10/18/2024 Radiation    Course: C3    Plan ID Energy Fractions Dose per Fraction (cGy) Dose Correction (cGy) Total Dose Delivered (cGy) Elapsed Days   R Gluteus:1  10X/6X 14 / 15 300 0 4,200 17      Treatment Dates:  10/1/2024 - 10/18/2024.          Pertinent Medical History     03/03/25: Patient is seen in office for continuity of care and follow-up for stage IV adenocarcinoma of right lung.  She is accompanied by her friend.  She reports feeling extremely tired since her last infusion, continues with nausea and headaches.  She has tapered down her steroids to 2 mg twice daily.  She also continues with epistaxis, Xarelto dose reduced to 10 mg during this hospitalization.  She has also noted increased pressure/lump on her gluteal area that was radiated in the past, she had discussed with radiation oncology unfortunately can no longer get further radiation therapy to that area.  She ambulates with a cane or walker when she goes out but most of the time she walks by herself, has home health aide who comes during the day.     Review of Systems   Constitutional:  Positive for fatigue.   Eyes:  Negative for visual disturbance.   Respiratory:  Positive for shortness of breath.    Cardiovascular:  Negative for chest pain and palpitations.   Gastrointestinal:  Positive for nausea (on and off.). Negative for abdominal pain, blood in stool, constipation and diarrhea.   Skin:  Negative for rash.   Neurological:  Positive for headaches. Negative for dizziness.   All other systems reviewed and are negative.    Social History     Tobacco Use    Smoking status: Former     Current packs/day: 1.00     Average packs/day: 1 pack/day for 59.2 years (59.2 ttl pk-yrs)     Types: Cigarettes     Start date: 1966     Passive exposure: Past    Smokeless tobacco: Never    Tobacco comments:     Quit 2010   Vaping Use    Vaping status: Never Used   Substance and Sexual Activity    Alcohol use: Yes     Alcohol/week: 5.0 standard drinks of alcohol     Types: 5 Glasses of wine per week    Drug use: Never    Sexual activity: Not Currently     Partners: Male     Birth control/protection: Post-menopausal        "  Objective   /70 (BP Location: Right arm, Patient Position: Sitting, Cuff Size: Adult)   Pulse 90   Temp (!) 97.3 °F (36.3 °C) (Temporal)   Resp 16   Ht 5' 2\" (1.575 m)   Wt 52.6 kg (116 lb)   SpO2 99%   BMI 21.22 kg/m²     Pain Screening:  Pain Score:   4  ECOG   2  Physical Exam  Constitutional:       Appearance: Normal appearance.   HENT:      Head: Normocephalic and atraumatic.   Eyes:      Conjunctiva/sclera: Conjunctivae normal.      Pupils: Pupils are equal, round, and reactive to light.   Cardiovascular:      Rate and Rhythm: Normal rate and regular rhythm.      Pulses: Normal pulses.      Heart sounds: Normal heart sounds.   Pulmonary:      Comments: Diminished breath sounds bilaterally.  Abdominal:      General: Abdomen is flat. Bowel sounds are normal.   Musculoskeletal:         General: Normal range of motion.   Skin:     General: Skin is warm and dry.   Neurological:      General: No focal deficit present.      Mental Status: She is alert and oriented to person, place, and time.       Labs: I have reviewed the following labs:  Lab Results   Component Value Date/Time    WBC 2.10 (L) 02/19/2025 04:18 AM    RBC 2.89 (L) 02/19/2025 04:18 AM    Hemoglobin 8.6 (L) 02/19/2025 04:18 AM    Hematocrit 27.1 (L) 02/19/2025 04:18 AM    MCV 94 02/19/2025 04:18 AM    MCH 29.8 02/19/2025 04:18 AM    RDW 15.7 (H) 02/19/2025 04:18 AM    Platelets 99 (L) 02/19/2025 04:18 AM    Segmented % 71 02/19/2025 04:18 AM    Lymphocytes % 21 02/19/2025 04:18 AM    Monocytes % 5 02/19/2025 04:18 AM    Eosinophils Relative 1 02/19/2025 04:18 AM    Basophils Relative 1 02/19/2025 04:18 AM    Immature Grans % 1 02/19/2025 04:18 AM    Absolute Neutrophils 1.50 (L) 02/19/2025 04:18 AM     Lab Results   Component Value Date/Time    Potassium 4.4 02/19/2025 04:18 AM    Chloride 104 02/19/2025 04:18 AM    CO2 23 02/19/2025 04:18 AM    BUN 17 02/19/2025 04:18 AM    Creatinine 0.77 02/19/2025 04:18 AM    Glucose, Fasting 97 " 02/10/2025 09:20 AM    Calcium 8.3 (L) 02/19/2025 04:18 AM    Corrected Calcium 8.8 02/17/2025 01:00 AM    AST 15 02/17/2025 01:00 AM    ALT 11 02/17/2025 01:00 AM    Alkaline Phosphatase 41 02/17/2025 01:00 AM    Total Protein 5.8 (L) 02/17/2025 01:00 AM    Albumin 3.3 (L) 02/17/2025 01:00 AM    Total Bilirubin 0.45 02/17/2025 01:00 AM    eGFR 76 02/19/2025 04:18 AM     Lab Results   Component Value Date/Time    TSH 3RD GENERATON 0.864 02/10/2025 09:20 AM    Free T4 0.92 09/13/2024 08:22 AM     (H) 02/10/2025 09:20 AM

## 2025-03-03 NOTE — TELEPHONE ENCOUNTER
PT said she has been experiencing burning when urinating x's 3 days. She declined an OV. She is requesting an order put in for her to get an UA , C&S at the lab . She stated that she has an appt at the [facility] at 1:40 today, and would like to go to the lab while she is there to get it done.    Please advise    ThankYou

## 2025-03-03 NOTE — ASSESSMENT & PLAN NOTE
Was admitted in hospital in November for shortness of breath, found to have pulmonary embolism, started on Xarelto 20 mg daily however due to recurrent epistaxis dose was decreased to 10 mg during her recent hospitalization.  She complains of worsening shortness of breath and is taking with exertion, will repeat CTA chest to evaluate for any acute PE.     Orders:    CTA chest pe study; Future

## 2025-03-03 NOTE — ASSESSMENT & PLAN NOTE
Was evaluated by ENT, had cauterization but stopped bleeding for a while however recently noted with recurrent epistaxis.  Her Xarelto was reduced from 20 to 10 mg during recent hospitalization.  Will hold Avastin.

## 2025-03-04 ENCOUNTER — APPOINTMENT (OUTPATIENT)
Dept: LAB | Facility: CLINIC | Age: 75
End: 2025-03-04
Payer: MEDICARE

## 2025-03-04 PROCEDURE — 82024 ASSAY OF ACTH: CPT

## 2025-03-04 PROCEDURE — 36415 COLL VENOUS BLD VENIPUNCTURE: CPT

## 2025-03-05 LAB
ACTH PLAS-MCNC: <1.5 PG/ML (ref 7.2–63.3)
ANISOCYTOSIS BLD QL SMEAR: PRESENT
BASOPHILS # BLD MANUAL: 0 THOUSAND/UL (ref 0–0.1)
BASOPHILS NFR MAR MANUAL: 0 % (ref 0–1)
DIFFERENTIAL COMMENT: ABNORMAL
EOSINOPHIL # BLD MANUAL: 0.08 THOUSAND/UL (ref 0–0.4)
EOSINOPHIL NFR BLD MANUAL: 1 % (ref 0–6)
LG PLATELETS BLD QL SMEAR: PRESENT
LYMPHOCYTES # BLD AUTO: 0.75 THOUSAND/UL (ref 0.6–4.47)
LYMPHOCYTES # BLD AUTO: 9 % (ref 14–44)
METAMYELOCYTE ABSOLUTE CT: 0.42 THOUSAND/UL (ref 0–0.1)
METAMYELOCYTES NFR BLD MANUAL: 5 % (ref 0–1)
MONOCYTES # BLD AUTO: 0.75 THOUSAND/UL (ref 0–1.22)
MONOCYTES NFR BLD: 9 % (ref 4–12)
MYELOCYTE ABSOLUTE CT: 0.83 THOUSAND/UL (ref 0–0.1)
MYELOCYTES NFR BLD MANUAL: 10 % (ref 0–1)
NEUTROPHILS # BLD MANUAL: 5.48 THOUSAND/UL (ref 1.85–7.62)
NEUTS BAND NFR BLD MANUAL: 3 % (ref 0–8)
NEUTS SEG NFR BLD AUTO: 63 % (ref 43–75)
NRBC BLD AUTO-RTO: 1 /100 WBC (ref 0–2)
PATHOLOGY REVIEW: YES
PLATELET BLD QL SMEAR: ABNORMAL
RBC MORPH BLD: PRESENT

## 2025-03-05 PROCEDURE — 85060 BLOOD SMEAR INTERPRETATION: CPT | Performed by: STUDENT IN AN ORGANIZED HEALTH CARE EDUCATION/TRAINING PROGRAM

## 2025-03-06 ENCOUNTER — OFFICE VISIT (OUTPATIENT)
Age: 75
End: 2025-03-06
Payer: MEDICARE

## 2025-03-06 VITALS
WEIGHT: 113.1 LBS | DIASTOLIC BLOOD PRESSURE: 72 MMHG | TEMPERATURE: 97.6 F | SYSTOLIC BLOOD PRESSURE: 111 MMHG | BODY MASS INDEX: 20.81 KG/M2 | OXYGEN SATURATION: 96 % | HEIGHT: 62 IN | RESPIRATION RATE: 18 BRPM | HEART RATE: 80 BPM

## 2025-03-06 DIAGNOSIS — G89.3 CANCER ASSOCIATED PAIN: ICD-10-CM

## 2025-03-06 DIAGNOSIS — C49.5: ICD-10-CM

## 2025-03-06 DIAGNOSIS — G47.00 INSOMNIA, UNSPECIFIED TYPE: ICD-10-CM

## 2025-03-06 DIAGNOSIS — C79.31 METASTATIC CANCER TO BRAIN (HCC): Primary | ICD-10-CM

## 2025-03-06 DIAGNOSIS — Z51.5 PALLIATIVE CARE PATIENT: ICD-10-CM

## 2025-03-06 PROCEDURE — G2211 COMPLEX E/M VISIT ADD ON: HCPCS

## 2025-03-06 PROCEDURE — 99348 HOME/RES VST EST LOW MDM 30: CPT

## 2025-03-06 RX ORDER — HYDROMORPHONE HYDROCHLORIDE 2 MG/1
2 TABLET ORAL EVERY 4 HOURS PRN
COMMUNITY
End: 2025-03-19 | Stop reason: SDUPTHER

## 2025-03-06 NOTE — PROGRESS NOTES
Ayla Nye was seen in the home today.  Patient was seen with daughter, Juliana. Palliative CRNP present at visit. Patient and Juliana provided all pertinent information today.      Symptom Management    ESAS Scale - Please rate from 0-10 the degree to which you experience the following symptoms (0 being none to 10 being the worst):  Pain - 2  Tired - 7 - not sleeping well at night, most likely due to steroids BID.  Drowsiness - 0  Nausea - 0  Appetite - 4  Shortness of Breath - 0 at rest and 4 walking up stairs (stops and rests half way up flight of stairs).  Depression - 0  Anxiety - 0  Wellbeing - 4  Sleep - 10 (takes steroid twice daily - will start to take second dose at 3:00 pm).    Ayla Nye 's medications were verified today with Ayla. Ayla is managing the medications. All medications are up to date. Patient has no refills sent to the provider via EPIC.    Allergies verified. No new allergies.     All medical, surgical, family and social history were reviewed with Ayla. There are no updates to patient's history.    All questions and concerns were addressed.  Patient and daughter were appreciative of the visit.    Next visit with RN and provider in 4 weeks.

## 2025-03-06 NOTE — PROGRESS NOTES
[facility] Visit: follow up   Name: Ayla Nye      : 1950      MRN: [id number]  Encounter Provider: ZULEYMA Pollard  Encounter Date: 3/6/2025   Encounter department: [facility]    Assessment & Plan   1. Metastatic cancer to brain (HCC)  Assessment & Plan:  Follows with Dr. Castro.   Completed SRS  Immunotherapy discontinued- plan is to initiate chemo therapy.   On prednisone taper - managed by Dr. Castro.    2. 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 Castro med oncology  Scheduled for 15 treatments of radiation therapy- started 10/1  3. Insomnia, unspecified type  Assessment & Plan:  Patient states she has toruble sleeping  Sleeps a few hours, wakes and will find that she eats to pass the time  On steroid taper      Discussed use of sleep aid.   Patient will take pain medication at bedtime to help with pain    4. Palliative care patient  Assessment & Plan:  Ayla follows with palliative care for psychosocial support and symptom management of lung cancer with brain mets, perianal tumor   Symptoms - dyspnea on exertion, fatigue, pain, insomnia   ACP -  on file   RTC - 4 weeks    Lives alone.   Has two daughters .   Ex- is supportive and involved in care  Has brother that lives close by that helps as well.   Daughters are getting a life alert set up for patient.     No longer drives.   5. Cancer associated pain  Assessment & Plan:  Current regimen:  Gabapentin 200mg AM,  300mg Bedtime  Dilaudid 2mg Q4hrs PRN pain. ( Patient may increased to 4mg PRN pain if needed)  Tylenol 650mg PRN  Heating pad during the day  Failed therapies:  Celebrex 200mg BID  Oxycodone- felt like she had a headache from the medication  Would like to avoid opiates  Bowel regimen to prevent OIC:  Senna          Subjective   Chief Complaint   Patient presents with    Follow-up     Follow up home visit for  psychosocial support and symptom management secondary to palliative diagnosis of lung cancer with mets to brain, perianal tumor.        History of Present Illness     Ayla Nye is a 74 y.o. female     w/ Stage III NSCLC, new onset brain mets 7/30/24, completed SRS. She was found to have mass in the right anorectal fat/right ischial tuberosity per PET. She follows with [facility]      Patient is seen in the home with daughter- Juliana and palliative RN present    Patient recently hospitalized.  Has upcoming apt with Dr. Castro- will be discussing infusions.,     Confirmed goals of care with patient- treatment focused.     Discussed pain medications with patient.     Patient working on diet.   Trouble sleeping - due to steroids.   Discussed options for sleep.    No falls.   Has caregiver that comes to help and sit with patients.  Stays over night on days of infusions to monitor for fevers.        Current Outpatient Medications:     amLODIPine (NORVASC) 5 mg tablet, TAKE 1 TABLET(5 MG) BY MOUTH DAILY, Disp: 30 tablet, Rfl: 5    dexamethasone (DECADRON) 2 mg tablet, Take 2 mg by mouth 2 (two) times a day with meals, Disp: , Rfl:     folic acid (FOLVITE) 1 mg tablet, Take 1 tablet (1 mg total) by mouth daily, Disp: 30 tablet, Rfl: 6    gabapentin (NEURONTIN) 100 mg capsule, TAKE 1 CAPSULE BY MOUTH EVERY MORNING, 1 CAPSULE EVERY AFTERNOON AND 3 CAPSULES EVERY NIGHT AT BEDTIME (Patient taking differently: TAKE 2 CAPSULE BY MOUTH EVERY MORNING AND 3 CAPSULES EVERY NIGHT AT BEDTIME), Disp: 150 capsule, Rfl: 5    HYDROmorphone (DILAUDID) 2 mg tablet, Take 2 mg by mouth every 4 (four) hours as needed for moderate pain, Disp: , Rfl:     levETIRAcetam (KEPPRA) 1000 MG tablet, TAKE 1 TABLET(1000 MG) BY MOUTH EVERY 12 HOURS, Disp: 60 tablet, Rfl: 1    levothyroxine 50 mcg tablet, TAKE 1 TABLET(50 MCG) BY MOUTH DAILY EARLY MORNING, Disp: 30 tablet, Rfl: 1    ondansetron (ZOFRAN) 4 mg tablet, Take 1 tablet  "(4 mg total) by mouth every 8 (eight) hours as needed for nausea or vomiting, Disp: 30 tablet, Rfl: 2    pantoprazole (PROTONIX) 40 mg tablet, Take 1 tablet (40 mg total) by mouth daily in the early morning, Disp: 30 tablet, Rfl: 0    rivaroxaban (XARELTO) 10 mg tablet, Take 1 tablet (10 mg total) by mouth daily with breakfast, Disp: 90 tablet, Rfl: 0    senna (SENOKOT) 8.6 MG tablet, Take 1 tablet by mouth daily Takes one every other day, Disp: , Rfl:     sulfamethoxazole-trimethoprim (BACTRIM DS) 800-160 mg per tablet, Take 1 tablet by mouth 3 (three) times a week Starting 12/24, you can take one tablet Monday, Wednesday, and Fridays., Disp: 36 tablet, Rfl: 1    vitamin B-12 (VITAMIN B-12) 1,000 mcg tablet, Take 1 tablet (1,000 mcg total) by mouth daily, Disp: 90 tablet, Rfl: 1    sodium chloride (OCEAN) 0.65 % nasal spray, 1 spray into each nostril as needed for congestion, Disp: 60 mL, Rfl: 1    Objective   /72 (BP Location: Left arm, Patient Position: Sitting, Cuff Size: Standard)   Pulse 80   Temp 97.6 °F (36.4 °C) (Temporal)   Resp 18   Ht 5' 2\" (1.575 m)   Wt 51.3 kg (113 lb 1.5 oz)   SpO2 96%   BMI 20.69 kg/m²   Physical Exam  Vitals reviewed.   Constitutional:       General: She is not in acute distress.  HENT:      Head: Normocephalic and atraumatic.   Eyes:      General:         Right eye: No discharge.         Left eye: No discharge.   Cardiovascular:      Rate and Rhythm: Normal rate.   Pulmonary:      Effort: Pulmonary effort is normal. No respiratory distress.   Abdominal:      General: Bowel sounds are normal. There is no distension.      Palpations: Abdomen is soft.   Musculoskeletal:         General: Normal range of motion.      Cervical back: Normal range of motion.      Right lower leg: No edema.      Left lower leg: No edema.   Skin:     General: Skin is warm and dry.   Neurological:      Mental Status: She is alert and oriented to person, place, and time.   Psychiatric:         " Mood and Affect: Mood normal.         Behavior: Behavior normal.         Thought Content: Thought content normal.         Judgment: Judgment normal.          Recent labs:  Lab Results   Component Value Date/Time    SODIUM 140 03/03/2025 02:55 PM    K 4.4 03/03/2025 02:55 PM    BUN 18 03/03/2025 02:55 PM    CREATININE 1.10 03/03/2025 02:55 PM    GLUC 94 03/03/2025 02:55 PM    CALCIUM 9.4 03/03/2025 02:55 PM    AST 12 (L) 03/03/2025 02:55 PM    ALT 12 03/03/2025 02:55 PM    ALB 3.8 03/03/2025 02:55 PM    TP 7.1 03/03/2025 02:55 PM    EGFR 49 03/03/2025 02:55 PM     Lab Results   Component Value Date/Time    HGB 9.3 (L) 03/03/2025 02:55 PM    WBC 8.30 03/03/2025 02:55 PM     (H) 03/03/2025 02:55 PM    INR 1.25 (H) 02/14/2025 08:00 AM    PTT 34 02/14/2025 08:00 AM     Lab Results   Component Value Date/Time    AIP5BUKRVXGV 0.560 03/03/2025 02:55 PM       Recent Imaging:  Procedure: XR chest 1 view portable  Result Date: 2/14/2025  Narrative: XR CHEST PORTABLE INDICATION: Cough. COMPARISON: 1 view chest 2/3/2025 FINDINGS: Patient rotated on the frontal view. Stable right suprahilar upper lobe opacity attributed to known mass and fibrotic scarring. No pneumothorax or pleural effusion. Normal cardiomediastinal silhouette. Bones are unremarkable for age. Normal upper abdomen.     Impression: No radiographic evidence of acute intrathoracic process or significant interval change allowing for difference in positioning. Workstation performed: [id number]     Procedure: XR chest 1 view portable  Result Date: 2/3/2025  Narrative: XR CHEST PORTABLE INDICATION: WEAKNESS. COMPARISON: 12/30/2024 and 12/1/2024 FINDINGS: Increasing density of right upper lobe opacity compared with prior studies, likely postobstructive consolidation with progression of volume loss and upward deviation of the fissure. No acute new infiltrates or dominant mass lesions seen elsewhere at this  time. No pneumothorax or pleural effusion. Stable  cardiomediastinal silhouette Bones are unremarkable for age. Normal upper abdomen.     Impression: Increased density of pre-existing right upper lobe region opacity with suggestion of progressive volume loss in the right upper lobe with upward shifting of the fissure. Workstation performed: [id number]     Procedure: CT head without contrast  Result Date: 2/3/2025  Narrative: CT BRAIN - WITHOUT CONTRAST INDICATION:   Brain mets with worsening headache and nose bleeds. on xarelto. COMPARISON: CT head 11/24/2024 and brain MRI 12/27/2024 TECHNIQUE:  CT examination of the brain was performed.  Multiplanar 2D reformatted images were created from the source data. Radiation dose length product (DLP) for this visit:  984 mGy-cm .  This examination, like all CT scans performed in the  Network, was performed utilizing techniques to minimize radiation dose exposure, including the use of iterative reconstruction and automated exposure control. IMAGE QUALITY:  Diagnostic. FINDINGS: PARENCHYMA:No intracranial mass, mass effect or midline shift. No CT signs of acute infarction.  No acute parenchymal hemorrhage. Left posterior frontal, parietal and occipital vasogenic edema. Left posterior frontal parafalcine vasogenic increase since November 2024 and likely unchanged since December 2024 allowing for difference in imaging modalities. Stable 5 mm midline shift to the right. Chronic microangiopathy. Calcification of bilateral internal carotid arteries. VENTRICLES AND EXTRA-AXIAL SPACES: Effaced left occipital horn unchanged. No acute hydrocephalus. VISUALIZED ORBITS: Normal visualized orbits. PARANASAL SINUSES: Normal visualized paranasal sinuses. CALVARIUM AND EXTRACRANIAL SOFT TISSUES: Unremarkable scalp. No skull fracture. Bilateral hyperostosis frontalis.     Impression: No acute intracranial process. Left posterior frontal, parietal and occipital vasogenic edema with 5 mm midline shift to the right without  significant change since December 27, 2024 allowing for difference in imaging modalities and head positioning. Workstation performed: [id number]     Procedure: CTA chest pe study  Result Date: 1/24/2025  Narrative: CTA - CHEST WITH IV CONTRAST - PULMONARY ANGIOGRAM INDICATION: r/o PE. COMPARISON: CT chest abdomen pelvis 1/23/2025. Chest CT 1/14/2025. CTA chest abdomen pelvis 11/24/2024. TECHNIQUE: CTA examination of the chest was performed using angiographic technique according to a protocol specifically tailored to evaluate for pulmonary embolism. Multiplanar 2D reformatted images were created from the source data. In addition, coronal  3D MIP postprocessing was performed on the acquisition scanner. Radiation dose length product (DLP) for this visit: 176 mGy-cm . This examination, like all CT scans performed in the [facility], was performed utilizing techniques to minimize radiation dose exposure, including the use of iterative reconstruction and automated exposure control. IV Contrast: 50 mL of iohexol (OMNIPAQUE) FINDINGS: PULMONARY ARTERIAL TREE: Tiny peripheral filling defect in a left lower lobe segmental artery on image 302/106. In retrospect this is unchanged since 1/14/2025, and represents sequela of previously seen embolus on 11/24/2024. No new filling defects to indicate acute embolus. LUNGS: 3.4 x 2.7 cm paramediastinal right upper lobe mass seen again. Surrounding fibrotic changes within the right upper lobe extending through the right lower lobe in a linear fashion likely due to radiation. Background mild emphysema. 1.6 cm lingular and 1.5 cm left lower lobe nodule seen again. PLEURA: Small bilateral pleural effusions. Calcified pleural plaque along the left lower lobe. HEART/GREAT VESSELS: Normal heart size. Atherosclerotic coronary artery and thoracic aortic calcification. No thoracic aortic aneurysm. MEDIASTINUM AND SUDEEP: Small hiatal hernia noted. No mediastinal lymphadenopathy.  CHEST WALL AND LOWER NECK: Unremarkable. VISUALIZED STRUCTURES IN THE UPPER ABDOMEN: Moderate hiatal hernia. Partially visualized right renal mass better seen on CT chest abdomen pelvis from the day prior. OSSEOUS STRUCTURES: Mild T5 superior endplate compression fracture. Thoracic spondylosis.     Impression: 1. No evidence of acute pulmonary embolism. Stable tiny chronic embolus in a left lower lobe segmental artery. 2. Small bilateral pleural effusions. Otherwise no significant interval change in exam when compared to CT chest, abdomen, and pelvis examination from day prior. Resident: Reg Head I, the attending radiologist, have reviewed the images and agree with the final report above. Workstation performed: [id number]     Procedure: CT chest abdomen pelvis w contrast  Result Date: 1/24/2025  Narrative: CT CHEST, ABDOMEN AND PELVIS WITH IV CONTRAST INDICATION: fever and bilateral lumbar/flank pain; sepsis. COMPARISON: CT 1/14/2025, 12/30/2024, 11/24/2024, 10/22/2024 TECHNIQUE: CT examination of the chest, abdomen and pelvis was performed. Multiplanar 2D reformatted images were created from the source data. This examination, like all CT scans performed in the [facility], was performed utilizing techniques to minimize radiation dose exposure, including the use of iterative reconstruction and automated exposure control. Radiation dose length product (DLP) for this visit: 315 mGy-cm IV Contrast: 90 mL of iohexol (OMNIPAQUE) Enteric Contrast: Not administered. FINDINGS: CHEST LUNGS: Right upper lobe mass measures 3.2 x 2.4 cm, shows progressive enlargement. There is currently more abutment with the adjacent mediastinum then when compared to the most recent prior. Parenchymal scarring in the right upper lobe and right lower lobe superior segments, likely radiation changes. 16 mm lingular nodule series 301/82, 8 mm on most recent prior 15 mm left lower lobe nodule series 301/82, 13 mm on most  recent prior PLEURA: Unremarkable. HEART/GREAT VESSELS: Heart is unremarkable for patient's age. No thoracic aortic aneurysm. MEDIASTINUM AND SUDEEP: Unremarkable. CHEST WALL AND LOWER NECK: Unremarkable. ABDOMEN LIVER/BILIARY TREE: Unremarkable. GALLBLADDER: No calcified gallstones. No pericholecystic inflammatory change. SPLEEN: Unremarkable. PANCREAS: Unremarkable. ADRENAL GLANDS: Unremarkable. KIDNEYS/URETERS: Right upper pole ill-defined hypoenhancing mass measures 5.5 x 4.2 cm on series 301/108, this measured as large as 4.5 cm on the most recent prior CT abdomen based off of my measurement Interval increase of bilateral perinephric stranding, even when compared to the most recent chest CT. There is mild right urothelial thickening in the renal pelvis STOMACH AND BOWEL: Colonic diverticulosis without findings of acute diverticulitis. APPENDIX: No findings to suggest appendicitis. ABDOMINOPELVIC CAVITY: 3.6 x 2.5 cm right ischiorectal fossa mass, series 301/227, decreased in size VESSELS: Unremarkable for patient's age. PELVIS REPRODUCTIVE ORGANS: Unremarkable for patient's age. URINARY BLADDER: Unremarkable. ABDOMINAL WALL/INGUINAL REGIONS: Unremarkable. BONES: Mild T5 superior endplate compression, in retrospect, there may have been a mild superior endplate compression on the most recent chest CT, which is now more pronounced, with surrounding sclerosis suggesting healing.     Impression: 1.  Findings consistent with disease progression, with rapid interval enlargement of left lung pulmonary nodules when compared to the CT 1/14/2025, increase in right upper lobe mass, and increase in right renal mass. There has been interval decrease in size of right ischiorectal fossa mass when compared to 11/24/2024. 2.  Bilateral perinephric stranding, with right urothelial thickening, concerning for urinary tract infection 3.  Mild T5 compression deformity, appears subacute The study was marked in EPIC for immediate  notification. Workstation performed: [id number]     Procedure: CT chest w contrast  Result Date: 1/14/2025  Narrative: CT CHEST WITH IV CONTRAST INDICATION: C34.11: Malignant neoplasm of upper lobe, right bronchus or lung. New onset back pain COMPARISON: Abdominal CT dated 12/31/2024 TECHNIQUE: CT examination of the chest was performed. Multiplanar 2D reformatted images were created from the source data. This examination, like all CT scans performed in the [facility], was performed utilizing techniques to minimize radiation dose exposure, including the use of iterative reconstruction and automated exposure control. Radiation dose length product (DLP) for this visit: 157.4 mGy-cm IV Contrast: 85 mL of iohexol (OMNIPAQUE) FINDINGS: LUNGS: There are enlarging nodules in the left lower lobe and lingula including 8 mm lingula nodule on image 176 of series 3 which measured 5 mm previously. A bilobed nodule versus 2 adjacent nodules in the left lower lobe measure 8 x 13 mm on image 177 compared with 4 x 8 mm previously. Right upper lobe mass measures 2.5 cm AP by 2.5 cm transverse on image 61 of series 3, unchanged. Adjacent bandlike parenchymal opacity with involvement of the right upper and lower lobes appears similar and may reflect  posttreatment change. There is a background of mild emphysema. PLEURA: Unremarkable. HEART/GREAT VESSELS: Heart is unremarkable for patient's age. No thoracic aortic aneurysm. MEDIASTINUM AND SUDEEP: Low-attenuation soft tissue along the mediastinum on the right which is contiguous with the esophagus may reflect volume averaging with the adjacent azygos vein. CHEST WALL AND LOWER NECK: Unremarkable. VISUALIZED STRUCTURES IN THE UPPER ABDOMEN: Ill-defined mass along the upper pole of the right kidney measures 3.8 cm transverse by 2.9 cm AP on image 104 of series 2, similar to prior abdominal CT. Hepatic steatosis is noted. OSSEOUS STRUCTURES: No acute fracture or destructive  osseous lesion.     Impression: 1. No evidence of osseous metastatic disease of the thoracic spine. 2. Worsening nodules in the left lower lobe and lingula compatible with metastatic disease. 3. Similar appearance of the posttreatment change in the right lung. 4. Low-attenuation soft tissue density along the right mediastinum appears slightly more pronounced but at least partially reflects the azygos vein. Recommend attention to this area on follow-up. 5. Right upper renal mass is similar to recent abdominal CT. Primary or metastatic renal neoplasm suspected. Workstation performed: [id number]     Procedure: CT abdomen w contrast  Result Date: 1/1/2025  Narrative: CT ABDOMEN WITH IV CONTRAST INDICATION: known malignancy. COMPARISON: 11/24/2024 TECHNIQUE: CT examination of the abdomen was performed after the administration of intravenous contrast. Multiplanar 2D reformatted images were created from the source data. This examination, like all CT scans performed in the [facility], was performed utilizing techniques to minimize radiation dose exposure, including the use of iterative reconstruction and automated exposure control. Radiation dose length product (DLP) for this visit: 214 mGy-cm IV Contrast: 85 mL of iohexol (OMNIPAQUE) Enteric Contrast: Not administered. FINDINGS: LOWER CHEST: Calcified left lower lobe pleural plaque. Cardiomegaly. LIVER/BILIARY TREE: Unremarkable. GALLBLADDER: No calcified gallstones. No pericholecystic inflammatory change. SPLEEN: Unremarkable. PANCREAS: Unremarkable. ADRENAL GLANDS: Unremarkable. KIDNEYS/VISUALIZED URETERS: Enlarging complex mass upper pole right kidney measuring 4.8 x 3.0 cm (previously 3.2 x 1.9 cm at a comparable level on exam from 11/24/2024). No hydronephrosis. Nonobstructing right nephrolithiasis. Scattered subcentimeter hypodensities arising from both kidneys, too small to characterize. STOMACH AND VISUALIZED BOWEL: Unremarkable. ABDOMINAL CAVITY:  No ascites. No pneumoperitoneum. No lymphadenopathy. VESSELS: Atherosclerosis without abdominal aortic aneurysm. ABDOMINAL WALL: Unremarkable. BONES: No acute fracture or suspicious osseous lesion. Spinal degenerative changes.     Impression: 1.  Enlarging complex mass upper pole right kidney concerning for enlarging metastasis or primary renal neoplasia. 2.  No acute findings. See comments above for full analysis. Workstation performed: [id number]     Procedure: XR chest pa and lateral  Result Date: 12/31/2024  Narrative: XR CHEST PA AND LATERAL INDICATION: SOB. COMPARISON: Chest radiograph 12/1/2024 and concurrent CTA chest FINDINGS: Known right upper lobe mass is evident, though its full extent is better evident on prior CT. Increased right upper lobe atelectasis. Persistent opacities throughout the right upper lung. No pneumothorax or pleural effusion. Normal cardiomediastinal silhouette. Age-related degenerative changes in the spine. Normal upper abdomen..    Impression: Right upper lobe mass which is better characterized on concurrent CTA chest. Right upper lung atelectasis has increased since the 12/1/2024 radiograph. Opacities in the right upper lung could represent posttreatment change an/or tumor. Infection is to be  determined clinical grounds. Resident: ASYA CHANCE I, the attending radiologist, have reviewed the images and agree with the final report above. 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 COMPARISON: CTA PE study 11/24/2024. TECHNIQUE: CTA examination of the chest was performed using angiographic technique according to a protocol specifically tailored to evaluate for pulmonary embolism. Multiplanar 2D reformatted images were  created from the source data. In addition, coronal  3D MIP postprocessing was performed on the acquisition scanner. Radiation dose length product (DLP) for this visit: 191 mGy-cm . This examination, like all CT scans performed in the [facility], was performed utilizing techniques to minimize radiation dose exposure, including the use of iterative reconstruction and automated exposure control. IV Contrast: 85 mL of iohexol (OMNIPAQUE) FINDINGS: PULMONARY ARTERIAL TREE: Interval resolution of right middle lobe/right lower lobe pulmonary embolism. Interval near complete resolution of previously seen left lower lobe pulmonary embolism with questionable minimal residual linear thrombus in this region (301/109). LUNGS: Spiculated right upper lobe nodule measures 2.5 x 2.4 cm (3/62), slightly larger than on prior exam. Similar appearance of right upper lobe groundglass opacities with interval increase in opacities in the superior right lower lobe. Cluster of micronodules in the medial right upper lobe (304/) may be infectious or inflammatory. Additional groundglass and reticular opacities in the left upper lobe. New lingular 6 mm nodule (304/173) and left lower lobe 7 mm nodule (304/170). Mild emphysema. PLEURA: Left calcified basilar pleural plaque (301/158). HEART/GREAT VESSELS: Heart is normal in size. Coronary artery calcifications. No thoracic aortic aneurysm. MEDIASTINUM AND SUDEEP: Unremarkable. CHEST WALL AND LOWER NECK: Unremarkable. VISUALIZED STRUCTURES IN THE UPPER ABDOMEN: Few calculi within the partially imaged right kidney measuring up to 3 mm without hydronephrosis in the visualized portion of the right kidney. Exophytic lesion arising from the upper pole of the right kidney measuring approximately 2.6 cm (602/112) in the region in which metastasis was suspected on prior CT. OSSEOUS STRUCTURES: Degenerative changes of the spine.     Impression: No new pulmonary embolism. Resolution of  previously seen right-sided pulmonary embolism. Near complete resolution of previously seen left lower lobe pulmonary embolism with questionable minimal residual linear thrombus in this region. Spiculated right upper lobe nodule measures slightly larger than on prior exam. Increase in right lung groundglass opacities which may represent worsening pneumonia and/or malignancy. Incompletely evaluated right upper pole lesion appears larger than on prior exam, suspicious for metastasis. Dedicated abdominal imaging is recommended. Examination was marked in EPIC for immediate notification. Workstation performed: [id number]     Procedure: MRI Brain BT w wo Contrast  Result Date: 12/29/2024  Narrative: MRI BRAIN WITH AND WITHOUT CONTRAST INDICATION: C79.31: Secondary malignant neoplasm of brain. COMPARISON: MRI brain 10/21/2024. MRI brain brain tumor protocol 10/8/2024. TECHNIQUE: Multiplanar, multisequence imaging of the brain and sella was performed before and after gadolinium administration. IV Contrast:  10 mL of Gadobutrol injection (SINGLE-DOSE) IMAGE QUALITY:   Motion-degraded, which reduces diagnostic sensitivity. FINDINGS: Multiple enhancing metastatic lesions as detailed below with comparison made to MRI brain tumor protocol 10/8/2024. 1. Stable tiny, subtle enhancing lesion in the left frontal lobe on series 13, image 121. 2. Increased size of a now 1.1 cm lesion in the left paramedian parietal lobe on series 13, image 105, previously 0.6 cm. 3. Decreased size of a now tiny lesion in the left paramedian frontal lobe on series 13, image 102 that previously measured 3 mm. 4. Increased size of a now 3.5 cm left occipital lesion (centrally hypoenhancing) on series 13, image 81 that previously measured 2.2 cm. 5. Left temporal lesion that was previously 4 mm is not as conspicuous/barely perceptible on the current study. 6. Decreased size of a now tiny lesion in the midline cerebellar vermis on series 13, image 47,  previously 2 mm. There is a new lesion identified in the left frontal lobe on series 13, image 106 that measures 4 mm. There is slightly increased vasogenic edema in the left parietal and occipital lobes, when compared to the prior study, with mild mass effect on the left lateral ventricle. Slightly less conspicuous enhancement involving the bilateral cranial nerve VII and IACs. No acute infarct. Mild chronic ischemic changes of the white matter. PERFUSION: While most of the lesions above are too small to characterize by perfusion, there is no appreciable elevated perfusion associated with the left occipital and left parietal lobe lesions. VENTRICLES: As above. No hydrocephalus. SELLA AND PITUITARY GLAND:  Normal. ORBITS: No acute abnormality. PARANASAL SINUSES: Trace mucosal thickening. VASCULATURE:  Evaluation of the major intracranial vasculature demonstrates appropriate flow voids. CALVARIUM AND SKULL BASE: No acute abnormality. EXTRACRANIAL SOFT TISSUES: No acute abnormality.     Impression: 1. New enhancing lesion in the left frontal lobe, which is suspicious for metastasis. 2. Increased size of left occipital and left paramedian parietal lobe lesions without appreciable elevated perfusion. Findings favor posttreatment sequelae/radiation effects though recommend continued attention on follow-up. Of note, there is slightly increased associated vasogenic edema in the left cerebral hemisphere, as described above. 3. The other lesions are stable to decreased/less conspicuous compared to the prior study. 4. 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 Recommend continued clinical and imaging surveillance. The study was marked in EPIC for significant notification. Workstation performed: [id number]     Procedure: XR chest portable  Result Date: 12/1/2024  Narrative: XR CHEST PORTABLE INDICATION:  increased o2 requirements. COMPARISON: CXR 11/29/2024, chest CT 11/24/2024. FINDINGS: Redemonstration of right upper lobe nodule and right upper lobe pneumonia. No pneumothorax or pleural effusion. Normal cardiomediastinal silhouette. Bones are unremarkable for age. Normal upper abdomen.     Impression: Redemonstration of right upper lobe nodule and right upper lobe pneumonia with no significant change. Workstation performed: [id number]     Procedure: Bronchoscopy  Result Date: 11/29/2024  Narrative: Table formatting from the original result was not included. [facility] [address] [facility] [address] [phone] DATE OF SERVICE: 11/29/24 PHYSICIAN(S): Attending: Teresita Forde DO Fellow: Eliseo Small MD INDICATION: Pneumonia, Acute hypoxic respiratory failure (HCC) POST-OP DIAGNOSIS: See the impression below. PREPROCEDURE: Standard airway preparation completed per respiratory therapy protocol.  Informed consent was obtained. Images reviewed prior to the procedure.  A Time Out was performed. No suspicion or identified risk for TB or other airborne infectious disease; bronchoscopy procedure being performed for diagnostic purposes. PROCEDURE: Bronchoscopy DETAILS OF PROCEDURE: Patient was taken to the procedure room where a time out was performed to confirm correct patient and correct procedure. The patient underwent moderate sedation, which was administered by an anesthesia professional. The patient's ECG, blood pressure, heart rate, oxygen and respirations were monitored throughout the procedure. The patient experienced no blood loss. The scope was introduced through the right naris. The procedure was not difficult. The patient tolerated the procedure well. There were no apparent adverse events. ANESTHESIA INFORMATION: ASA: IV Anesthesia Type: General FINDINGS: Bronchoalveolar lavage was performed x3 in the right posterior segment (RB2) with 180 mL of saline instilled and a  total return of 70 mL. The fluid appeared cloudy. The vocal cords, trachea, main ramin, left main stem, left upper lobar bronchus, left apical-posterior segment (LB1+2), left upper anterior segment (LB3), lingular lobar bronchus, superior lingular segment (LB4), inferior lingular segment (LB5), left lower lobar bronchus, left superior segment (LB6), left anterior basal segment (LB7+8), left lateral basal segment (LB9), left posterior basal segment (LB10), right main stem, right upper lobar bronchus, right apical segment (RB1), right posterior segment (RB2), right anterior segment (RB3), bronchus intermedius, right middle lobar bronchus, right lateral segment (RB4), right medial segment (RB5), right lower lobar bronchus, right superior segment (RB6), right medial basal segment (RB7), right anterior basal segment (RB8), right lateral basal segment (RB9) and right posterior basal segment (RB10) appeared normal. SPECIMENS: ID Type Source Tests Collected by Time Destination 1 :  Lavage Lung, Right Upper Lobe Bronchoalveolar Lavage PULMONARY CYTOLOGY, BRONCHOALVEOLAR LAVAGE (BAL) DIFFERENTIAL Teresita Forde,  11/29/2024  4:11 PM       Impression: Bronchoalveolar lavage was performed x3 in the right posterior segment (RB2) and the fluid appeared cloudy The vocal cords, trachea, main ramin, left main stem, left upper lobar bronchus, left apical-posterior segment (LB1+2), left upper anterior segment (LB3), lingular lobar bronchus, superior lingular segment (LB4), inferior lingular segment (LB5), left lower lobar bronchus, left superior segment (LB6), left anterior basal segment (LB7+8), left lateral basal segment (LB9), left posterior basal segment (LB10), right main stem, right upper lobar bronchus, right apical segment (RB1), right posterior segment (RB2), right anterior segment (RB3), bronchus intermedius, right middle lobar bronchus, right lateral segment (RB4), right medial segment (RB5), right lower lobar bronchus,  right superior segment (RB6), right medial basal segment (RB7), right anterior basal segment (RB8), right lateral basal segment (RB9) and right posterior basal segment (RB10) appeared normal. RECOMMENDATION: Follow up culture results from Bronchoscopy      Procedure: XR chest portable  Result Date: 11/29/2024  Narrative: XR CHEST PORTABLE INDICATION: Re-evaluate PE. COMPARISON: Chest radiograph 11/24/2024, 10/16/2024 and CT chest, abdomen pelvis 11/24/2024 FINDINGS: Slight worsening of right upper lobe airspace opacity, partially obscuring known right upper lobe mass. The left lung is grossly clear. No pneumothorax or pleural effusion. Normal cardiomediastinal silhouette. Bones are unremarkable for age. Normal upper abdomen.     Impression: Slight worsening of right upper lobe airspace opacity, partially obscuring known right upper lobe mass. Workstation performed: [id number]     Procedure: XR chest 2 views  Result Date: 11/25/2024  Narrative: XR CHEST PA AND LATERAL INDICATION: Cough, shortness of breath. COMPARISON: Chest radiograph 10/16/2024. CT PE study 11/24/2024. FINDINGS: Patchy consolidation within the peripheral right upper lobe. Redemonstration of the right upper lobe paramediastinal mass measuring 2.5 x 1.9 cm. No pneumothorax or pleural effusion. Normal cardiomediastinal silhouette. Bones are unremarkable for age. Normal upper abdomen.     Impression: Right upper lobe pneumonia. Redemonstration of the right upper lobe mass, better evaluated on the CT from 11/24/2024. Resident: Sanaz Corona I, the attending radiologist, have reviewed the images and agree with the final report above. Workstation performed: [id number]     Procedure: Echo complete w/ contrast if indicated  Result Date: 11/25/2024  Narrative:   Left Ventricle: Left ventricular cavity size is normal. Wall thickness is normal. The left ventricular ejection fraction is 65%. Systolic function is normal. Wall motion is normal. Diastolic  function is mildly abnormal, consistent with grade I (abnormal) relaxation.   Mitral Valve: There is mild annular calcification.   Tricuspid Valve: There is mild regurgitation.     Procedure: CT pe study w abdomen pelvis w contrast  Result Date: 11/25/2024  Narrative: CT PULMONARY ANGIOGRAM OF THE CHEST AND CT ABDOMEN AND PELVIS WITH INTRAVENOUS CONTRAST INDICATION: elevated d-dimer, sob, r/o PE. HX Lung cancer with mets to brain. COMPARISON: CT 10/22/2024, 7/3/2024, PET/CT 3/22/2024 TECHNIQUE: CT examination of the chest, abdomen and pelvis was performed. Thin section CT angiographic technique was used in the chest in order to evaluate for pulmonary embolus and coronal 3D MIP postprocessing was performed on the acquisition scanner. Multiplanar 2D reformatted images were created from the source data. This examination, like all CT scans performed in the [facility], was performed utilizing techniques to minimize radiation dose exposure, including the use of iterative reconstruction and automated exposure control. Radiation dose length product (DLP) for this visit: 369 mGy-cm IV Contrast: 85 mL of iohexol (OMNIPAQUE) Enteric Contrast: Not administered. FINDINGS: CHEST PULMONARY ARTERIAL TREE: Embolism straddles between the middle lobe and right lower lobe basal segment arteries, as seen on series 301/112, this is new from the most recent prior. Additional embolism noted within the left lower lobe basal arteries straddling into the lower lingular segment, as noted on series 301/98, also new from prior. RV diameter at the level of the AV valve = 3. 9 cm LV diameter at the level of the AV valve = 2.7 cm Ratio equals 1.4 LUNGS: There are new patchy airspace opacities seen throughout the right upper lobe Spiculated right upper lobe nodule measures 2.5 x 1.9 cm, similar There is mild diffuse groundglass opacities throughout, likely representing a mild pulmonary edema, previously noted groundglass nodule is  predominantly obscured Mild emphysema PLEURA: Unremarkable. HEART/AORTA: Heart is unremarkable for patient's age. No thoracic aortic aneurysm. MEDIASTINUM AND SUDEEP: Unremarkable. CHEST WALL AND LOWER NECK: Unremarkable. ABDOMEN LIVER/BILIARY TREE: Unremarkable. GALLBLADDER: No calcified gallstones. No pericholecystic inflammatory change. SPLEEN: Unremarkable. PANCREAS: Unremarkable. ADRENAL GLANDS: Unremarkable. KIDNEYS/URETERS: 2 x 1.8 cm hypodense right upper pole lesion, series 306/30, with surrounding hypoenhancing parenchyma, similar to prior 0.6 cm ill-defined area of hypoenhancement within the posterior right lower pole series 306/58, stable from most recent prior 2 mm nonobstructing right upper pole stone STOMACH AND BOWEL: Unremarkable. APPENDIX: No findings to suggest appendicitis. ABDOMINOPELVIC CAVITY: No ascites. No pneumoperitoneum. No lymphadenopathy. VESSELS: Unremarkable for patient's age. PELVIS REPRODUCTIVE ORGANS: Unremarkable for patient's age. URINARY BLADDER: Unremarkable. ABDOMINAL WALL/INGUINAL REGIONS: 4 x 3.6 cm centrally hypodense collection within the right ischio rectal fossa, previously 6.3 cm BONES: No acute fracture or suspicious osseous lesion.     Impression: 1.  Bilateral pulmonary emboli 2.  The calculated ratio of right ventricular to left ventricular diameter (RV/LV ratio) is 1.4. This is greater than 0.9, which is abnormal and indicates right heart strain. An abnormal RV/LV ratio has been shown to be associated with an increased risk of 30 day mortality in the setting of acute pulmonary embolism. 3.  Right upper lobe opacities consistent with pneumonia 4.  Mild pulmonary edema 5.  Stable right upper lobe pulmonary mass 6.  Stable right renal lesion suspicious for metastasis 7.  Decrease in size of mass in the right ischio rectal fossa I personally discussed impression 1-3 with Fitz Burr at 11/25/24 12:44 AM Workstation performed: [id number]     Procedure: CT head without  contrast  Result Date: 11/24/2024  Narrative: CT BRAIN - WITHOUT CONTRAST INDICATION:   fever tachycardia, hx brain mets. COMPARISON: CT brain dated October 20, 2024. MRI brain dated October 21, 2024. TECHNIQUE:  CT examination of the brain was performed.  Multiplanar 2D reformatted images were created from the source data. Radiation dose length product (DLP) for this visit:  1003 mGy-cm .  This examination, like all CT scans performed in the [facility], was performed utilizing techniques to minimize radiation dose exposure, including the use of iterative reconstruction and automated exposure control. IMAGE QUALITY:  Diagnostic. FINDINGS: PARENCHYMA: Again noted is extensive vasogenic edema at the left parietal and occipital lobes similar to the prior exam in this patient with a known metastatic lesion in this region, poorly visualized on this unenhanced study. There is similar mass effect on the posterior horn of the left lateral ventricle. No midline shift. There is mild periventricular white matter low attenuation which is nonspecific and most likely related to chronic small vessel ischemic changes. No acute intracranial hemorrhage or ischemia. VENTRICLES AND EXTRA-AXIAL SPACES: See above. No hydrocephalus. VISUALIZED ORBITS: Normal visualized orbits. PARANASAL SINUSES: Normal visualized paranasal sinuses. CALVARIUM AND EXTRACRANIAL SOFT TISSUES: Normal.     Impression: Reidentified vasogenic edema at the left parietal and left occipital lobe similar to the prior exam, attributed to the patient's history of brain metastases, poorly visualized on this unenhanced study. There is similar mass effect on the posterior horn of the left lateral ventricle. No midline shift. No acute intracranial abnormality. Mild chronic small vessel ischemic changes. Workstation performed: [id number]       30 minutes total time spent on 3/6/2025 in caring for this patient including obtaining/reviewing history, symptom  "assessment and management, medication review / adjustment, psychosocial support, reviewing / ordering tests, medicines, imaging, procedures, goals of care, opioid titration, supportive listening, anticipatory guidance, patient/family education, instructions for management, importance of adhering to treatment plan, risks/benefits of treatment(s), risk factor reduction, and documenting in the medical record. All of the patient's questions were answered during this discussion.    ZULEYMA Pollard  [facility] [place] [phone]    Portions of this document may have been created using dictation software and as such some \"sound alike\" terms may have been generated by the system. Do not hesitate to contact me with any questions or clarifications.     "

## 2025-03-10 NOTE — ASSESSMENT & PLAN NOTE
Patient states she has toruble sleeping  Sleeps a few hours, wakes and will find that she eats to pass the time  On steroid taper      Discussed use of sleep aid.   Patient will take pain medication at bedtime to help with pain

## 2025-03-10 NOTE — ASSESSMENT & PLAN NOTE
Follows with Dr. Castro [facility] oncology  Scheduled for 15 treatments of radiation therapy- started 10/1

## 2025-03-10 NOTE — ASSESSMENT & PLAN NOTE
Current regimen:  Gabapentin 200mg AM,  300mg Bedtime  Dilaudid 2mg Q4hrs PRN pain. ( Patient may increased to 4mg PRN pain if needed)  Tylenol 650mg PRN  Heating pad during the day  Failed therapies:  Celebrex 200mg BID  Oxycodone- felt like she had a headache from the medication  Would like to avoid opiates  Bowel regimen to prevent OIC:  Senna

## 2025-03-10 NOTE — ASSESSMENT & PLAN NOTE
Ayla follows with palliative care for psychosocial support and symptom management of lung cancer with brain mets, perianal tumor   Symptoms - dyspnea on exertion, fatigue, pain, insomnia   ACP -  on file   RTC - 4 weeks    Lives alone.   Has two daughters .   Ex- is supportive and involved in care  Has brother that lives close by that helps as well.   Daughters are getting a life alert set up for patient.     No longer drives.

## 2025-03-11 ENCOUNTER — HOSPITAL ENCOUNTER (OUTPATIENT)
Dept: CT IMAGING | Facility: HOSPITAL | Age: 75
Discharge: HOME | End: 2025-03-11
Attending: INTERNAL MEDICINE
Payer: MEDICARE

## 2025-03-11 DIAGNOSIS — C34.11 MALIGNANT NEOPLASM OF UPPER LOBE, RIGHT BRONCHUS OR LUNG (HCC): ICD-10-CM

## 2025-03-11 DIAGNOSIS — Z86.711 HISTORY OF PULMONARY EMBOLUS (PE): ICD-10-CM

## 2025-03-11 PROCEDURE — 71275 CT ANGIOGRAPHY CHEST: CPT

## 2025-03-11 RX ADMIN — IOHEXOL 85 ML: 350 INJECTION, SOLUTION INTRAVENOUS at 17:51

## 2025-03-13 ENCOUNTER — TELEPHONE (OUTPATIENT)
Age: 75
End: 2025-03-13

## 2025-03-13 DIAGNOSIS — T45.1X5A COMPLICATION OF CHEMOTHERAPY, INITIAL ENCOUNTER: Primary | ICD-10-CM

## 2025-03-13 DIAGNOSIS — L40.50 PSORIATIC ARTHRITIS (HCC): ICD-10-CM

## 2025-03-13 RX ORDER — DEXAMETHASONE 2 MG/1
2 TABLET ORAL 2 TIMES DAILY WITH MEALS
Qty: 180 TABLET | Refills: 0 | Status: SHIPPED | OUTPATIENT
Start: 2025-03-13

## 2025-03-13 NOTE — TELEPHONE ENCOUNTER
Phone call to [facility]'s reading room. Spoke to Fabricio regarding having CTA study read for upcoming appointment 3/17/25.

## 2025-03-13 NOTE — TELEPHONE ENCOUNTER
Patient is taking this prescription because there tapering her down from her taking steroids. Pt would like a refill on this medication please advice ? On the Dexamethasone.

## 2025-03-15 NOTE — PROGRESS NOTES
Name: Ayla Nye      : 1950      MRN: [id number]  Encounter Provider: Rosalinda Castro MD  Encounter Date: 3/17/2025   Encounter department: [facility]  :  Assessment & Plan  Malignant neoplasm of upper lobe, right bronchus or lung (HCC)    Orders:    MRI pelvis w wo contrast; Future    CT chest abdomen pelvis w contrast; Future    JAK2 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         Hypothyroidism, unspecified type         History of Pneumocystis jirovecii pneumonia         Essential thrombocytosis (HCC)           Malignant neoplasm of upper lobe, right bronchus or lung (HCC)     Stage IV metastatic lung cancer, currently on carboplatin/paclitaxel and got radiation therapy from July to September, was on Keytruda which was stopped secondary to pneumonitis, on tapering dose of Decadron,  Noted to have worsening cerebral edema and memory loss 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 Patient with history of brain metastases from lung cancers, s/p SRS in 2024 and in 2025. Patient has been on steroids with decadron for many weeks now. Whenever steroids have been tapered to as low as 2 mg daily, patient would have episodes of worsening fatigue and confusion, similar to what was present during this admission.  CT brain showed stable findings with left posterior frontal, parietal, and occipital vasogenic  edema with 5 mm midline shift to the right.             1/13/2025     Patient continues with Decadron, will titrate down to 6 mg starting tomorrow.  She is also scheduled to get SRT on 1/16/2025.        Her case was discussed in neuro-oncology board and recommendations were for Avastin due to concerns of radiation induced vasogenic edema as she is not responding to steroids.     Concern with bleeding risk/on Xarelto-will monitor closely     Complaining of worsening mid thoracic back pain radiating under ribs bilaterally-will get CT chest but last imaging 31 Dec without active PE, no bony mets in that area.      Patient has appointment on 1/27/2025, encouraged to keep appointment and will be seen in office postradiation therapy.        Plan is to start her on low-dose Avastin and resume carboplatin and pemetrexed.  Will continue to hold on pembrolizumab as patient continues to be on steroids.     Will dose Avastin at 5 mg/kg to start one week after SBRT     SBRT later this week; 16th Jan      Will monitor closely for bleeding, BP, MS changes     Continue to wean decadron ; concern with AI with rapid taper      Admitted again 2/3-2/4/2025 for balance issue after 1/23/2025 admission for fever/ chills with no source      2/11/2025     To date, had 3 cycles of Carbo/Pemetrexed but was unable to get Avastin as had fever/chills post therapy     Will attempt again to treat 2/13/2025     Carboplatin dose AUC 4 Pemetrexed 300 mg/m2 rather than 500 mg/m2 and acceptable to give with GFR under 40 and will start Avastin and can be given with calculated clearance by Cockcroft and Gault      CrCl per Cockcroft and Gault is 44  GFR is 61per C/G      Both are acceptable to give these agent     Need to monitor urine protein which is more of an issue per the Avastin label the clearance      Monitor urine protein /BP/side effects      Doing better; currently on 3 mg bid decadron and will decrease to 2 mg bid next week-will not decrease  further         3/3/2025     Patient is currently reduced dose carboplatin AUC 4, pemetrexed 300 mg/m² and Avastin 5 mg/kg that was added with last cycle she received on 2/13/2025.     Despite dose reduction she did not tolerated therapy very well and was admitted to hospital from 2/14-0/19 for high-grade fever, nausea and generalized weakness.  Infectious workup was negative.     Patient is planned for next treatment on 3/6/2025, however we will hold on upcoming treatment due to ongoing weakness and will reconsider chemotherapy options in about 2 weeks     Will repeat CBC and CMP to rule out cytopenia versus electrolyte abnormalities attributing to her symptoms.     Pending lab work and imaging, will likely consider switching chemotherapy to Taxotere versus further dose reduction in current regimen.     Again has headaches with steroid taper, will go back to 4 mg decadron in am and 2 mg at night     Labs repeated today with WBC 8.3 Hgb 9.3 thus stable and plts 595 diff pending  Will most likely stop this regimen as not tolerating; avastin made her weaker with increased bleeding issues  Increased SOB      Will consider single agent salvage Taxotere     3/17/2025    Has not had treatment since last admission in mid Feb    Too toxic for patient; every treatment has been admitted    Has increased right gluteal pain-will reimage    Last scan months ago, will repeat CT CAP    Complains weakness/dizziness although better    Current deccadron at 2 mg bid and will not decrease; no headahces    Has been using Dilaudid for pain at 2 mg-will increase to 4 mg as states not helpful    Will start second line therapy with Taxotere single agent 75 mg/m2 every 3 weeks     Repeat labs/may need fluids       Acute metabolic encephalopathy  Patient daughter reports she noticed confusion with tapering of the steroid with fatigue.  UA is negative for infection  CT of the head shows no acute intracranial process but left posterior frontal  parietal and occipital vasogenic edema with 5 mm midline shift to the right without significant change, this was reviewed with radiation oncology and compared to prior Cts  Would start back on Decadron 4 mg twice daily and monitor mental status.  Currently alert oriented x 4 no acute neurofocal deficit noted.  If she has no improvement, would recommend going up to 4mg Q6H.   Would recommend slow taper again but would not taper below 2mg daily. She has been started on Avastin with the hope to bridge her off steroids as radiation oncology recommendation.     Essential thrombocytosis (HCC)      Plts 405, following; no treatment since getting chemotherapy       History of JAK2 positive thrombocytosis had required hydroxyurea in the past.  Her platelet counts have trended down likely in setting of chemotherapy.  Recent CBC now with thrombocytopenia  Hypothyroidism, unspecified type    Continue home dose of 50 mcg of levothyroxine            History of Pneumocystis jirovecii pneumonia  Continue Bactrim 1 tab 3 times a week        Epistaxis  Presents with recurrent epistaxis for the past week, complains of clot which is intermittent, while blowing the nose, no injury or trauma,  Recently started on Xarelto 20 mg daily for PE,-was held and restarted   Reached out to ENT, and cauterization of the bleeding vessel on the right nare stopped the bleeding.  ,Has not had issues and restarted Xarelto      Summary/Recommendations     C4 Carboplatin/Pemetrexed/Avastin 2/13/2025-last treatment/admitted post treatment    Will start single agent Taxotere    CT CAP and MRI right gluteal/pelvic area    Concern with weakness as dehydration vs LE issue     Follow closely      Follow up 2 weeks       History of Present Illness   No chief complaint on file.    HPI:  Ayla Nye is a 74 y.o. female with medical hx remarkable of HTN, HLD, Nodular Goiter, Osteoprosis, Psoriasis, essential thrombocytosis, Pulmonary Embolism, lung  adenocarcinoma as outlined below, and had superficial perianal mass biopsy showed squamous cell carcinoma s/p R gluteus radiation therapy completed 10/18/2024 .      history of [id number] (IIIB) lung adenocarcinoma of the right upper lobe, s/p concurrent chemoRT completing on 7/5/24. She completed a course of SRS on 8/8/24 after MRI brain demonstrated five supra- and infratentorial enhancing lesions compatible with metastases. She also completed a course of palliative radiation to right gluteus (for superficial squamous cell lesion) on 10/18/24.      On treatment with Carboplatin AUC 5 Pemetrexed 500 mg/m2 and Pembrolizumab 200 mg IV every 3 weeks x 4 cycles. Cycle 2 was completed on 11/14/24.  Unfortunately her therapy has been interrupted due to frequent hospitalizations.     History of [id number] (IIIB) lung adenocarcinoma of the right upper lobe, s/p concurrent chemoRT completing on 7/5/24. She completed a course of SRS on 8/8/24 after MRI brain demonstrated five supra- and infratentorial enhancing lesions compatible with metastases. She also completed a course of palliative radiation to right gluteus (for superficial squamous cell lesion) on 10/18/24.      Unfortunately her therapy has been interrupted due to frequent hospitalization, despite dose reduction.     Last chemotherapy on 2/13/2025 when she received carboplatin AUC 4 reduced from AUC 5, Pemetrexed 300 mg/m² which is reduced from 500 mg/m², bevacizumab 5 mg/kg, unfortunately developed fever, nausea and generalized weakness needing hospitalization again from 2/14 to 2/19 where she made SIRS criteria and was started on broad-spectrum antibiotics however infectious workup was negative.    3/17/2025    As above multiple issues with pain, weakness, fatigue.  Headaches less.  Currently on decadron 2 mg bid.  As above, will start treatment with Taxotere single agent          Oncology History   Cancer Staging   Metastatic adenocarcinoma (HCC)  Staging form: Lung,  AJCC 8th Edition  - Clinical stage from 4/15/2024: Stage IIIB (cT2b, cN3, cM0) - Signed by Rogelio Beebe DO on 4/15/2024  - Clinical: Stage IV (cM1) - Signed by Honey Gamboa MD on 9/23/2024  Oncology History   Metastatic adenocarcinoma (HCC)   2024 Initial Diagnosis    Primary lung adenocarcinoma, right (HCC)     3/26/2024 Biopsy    A-C. Lymph Node, Level 4R :    - Metastatic non-small cell carcinoma, most compatible with a primary lung adenocarcinoma; see note.       D-F. Lymph Node, Level 10R:    - Metastatic non-small cell carcinoma; see note.       G-I. Lymph Node, Level 4L:    - Metastatic non-small cell carcinoma; see note.       4/15/2024 -  Cancer Staged    Staging form: Lung, AJCC 8th Edition  - Clinical stage from 4/15/2024: Stage IIIB (cT2b, cN3, cM0) - Signed by Rogelio Beebe DO on 4/15/2024       5/23/2024 - 7/2/2024 Chemotherapy    alteplase (CATHFLO), 2 mg, Intracatheter, Every 1 Minute as needed, 6 of 6 cycles  CARBOplatin (PARAPLATIN) IVPB (GOG AUC DOSING), 130.4 mg, Intravenous, Once, 6 of 6 cycles  Administration: 130.4 mg (5/23/2024), 144.8 mg (5/30/2024), 138.4 mg (6/6/2024), 144.8 mg (6/13/2024), 132 mg (6/21/2024), 143.6 mg (7/2/2024)  PACLItaxel (TAXOL) chemo IVPB, 45 mg/m2 = 67.2 mg (90 % of original dose 50 mg/m2), Intravenous, Once, 6 of 6 cycles  Dose modification: 45 mg/m2 (original dose 50 mg/m2, Cycle 1, Reason: Anticipated Tolerance)  Administration: 67.2 mg (5/23/2024), 67.2 mg (5/30/2024), 67.2 mg (6/6/2024), 67.2 mg (6/13/2024), 67.2 mg (6/21/2024), 67.2 mg (7/2/2024)     5/23/2024 - 7/5/2024 Radiation    Treatment:  Course: C1    Plan ID Energy Fractions Dose per Fraction (cGy) Dose Correction (cGy) Total Dose Delivered (cGy) Elapsed Days   R Lung_Hilum 6X 30 / 30 200 0 6,000 43      Treatment dates:  C1: 5/23/2024 - 7/5/2024 8/8/2024 - 8/8/2024 Radiation    SRS 5 PTVs   2000 cGy     9/12/2024 Biopsy    Mass, Superficial perianal mass:  - Squamous cell  carcinoma.     Note: The patient's prior lung EBUS sampling shows the tumor to stain for TTF1 and Napsin with absent p40 expression.  The current perianal mass sampling shows diffuse p40 expression with absent TTF1 expression.  This may represent a primary anal/perianal squamous cell carcinoma versus a possible metastatic combined lung tumor (adenocarcinoma and previously unsampled squamous component).  Suggest clinical correlation and appropriate follow-up.         9/23/2024 -  Cancer Staged    Staging form: Lung, AJCC 8th Edition  - Clinical: Stage IV (cM1) - Signed by Honey Gamboa MD on 9/23/2024       10/1/2024 - 10/18/2024 Radiation    Course: C3    Plan ID Energy Fractions Dose per Fraction (cGy) Dose Correction (cGy) Total Dose Delivered (cGy) Elapsed Days   R Gluteus:1 10X/6X 14 / 15 300 0 4,200 17      Treatment Dates:  10/1/2024 - 10/18/2024.       10/7/2024 -  Chemotherapy    cyanocobalamin, 1,000 mcg, Intramuscular, Once, 2 of 3 cycles  Administration: 1,000 mcg (8/19/2024), 1,000 mcg (1/23/2025)  alteplase (CATHFLO), 2 mg, Intracatheter, Every 1 Minute as needed, 4 of 6 cycles  palonosetron (ALOXI), 0.25 mg, Intravenous, Once, 2 of 4 cycles  Administration: 0.25 mg (1/23/2025), 0.25 mg (2/13/2025)  fosaprepitant (EMEND) IVPB, 150 mg, Intravenous, Once, 4 of 6 cycles  Administration: 150 mg (10/7/2024), 150 mg (1/23/2025), 150 mg (2/13/2025), 150 mg (11/14/2024)  CARBOplatin (PARAPLATIN) IVPB (GOG AUC DOSING), 347 mg, Intravenous, Once, 4 of 6 cycles  Administration: 347 mg (10/7/2024), 256.8 mg (1/23/2025), 278.4 mg (2/13/2025), 346 mg (11/14/2024)  bevacizumab (AVASTIN) IVPB, 5 mg/kg = 280 mg (100 % of original dose 5 mg/kg), Intravenous, Once, 1 of 3 cycles  Dose modification: 5 mg/kg (original dose 5 mg/kg, Cycle 4, Reason: Anticipated Tolerance)  Administration: 280 mg (2/13/2025)  pemetrexed (ALIMTA) chemo infusion, 735 mg, Intravenous, Once, 4 of 6 cycles  Dose modification: 300 mg/m2  (original dose 500 mg/m2, Cycle 4, Reason: Anticipated Tolerance)  Administration: 700 mg (10/7/2024), 700 mg (1/23/2025), 468 mg (2/13/2025), 700 mg (11/14/2024)  pembrolizumab (KEYTRUDA) IVPB, 200 mg, Intravenous, Once, 2 of 2 cycles  Administration: 200 mg (11/14/2024), 200 mg (10/7/2024)     Metastatic cancer to brain (HCC)   7/29/2024 Initial Diagnosis    Metastatic cancer to brain (HCC)     8/8/2024 - 8/8/2024 Radiation    SRS 5 PTVs   2000 cGy     9/12/2024 Biopsy    Mass, Superficial perianal mass:  - Squamous cell carcinoma.     Note: The patient's prior lung EBUS sampling shows the tumor to stain for TTF1 and Napsin with absent p40 expression.  The current perianal mass sampling shows diffuse p40 expression with absent TTF1 expression.  This may represent a primary anal/perianal squamous cell carcinoma versus a possible metastatic combined lung tumor (adenocarcinoma and previously unsampled squamous component).  Suggest clinical correlation and appropriate follow-up.         10/1/2024 - 10/18/2024 Radiation    Course: C3    Plan ID Energy Fractions Dose per Fraction (cGy) Dose Correction (cGy) Total Dose Delivered (cGy) Elapsed Days   R Gluteus:1 10X/6X 14 / 15 300 0 4,200 17      Treatment Dates:  10/1/2024 - 10/18/2024.          Pertinent Medical History   CRT        C1 5/23/2024  C2  5/30/2024  C3 6/6/2024  C4 6/13/2024  C5 6/20/2024  C6 7/2/2024 at [place] f/b hospitalization for neutropenic fever     First line metastatic treatment     Carboplatin AUC 5 Pemetrexed 500 mg/m2 and Pembrolizumab 200 mg IV every 3 weeks x 4 cycles     C1 held due to hospital admission  C1 held due to radiation and concurrent high-dose steroid use     C1 now 10/7/2024  C2 10/28/2024-held due to admission     C2 11/14/2024- completed      No treatment since 11/14/2024 due to multiple admissions/toxicities, PCP pneumonia, PE on Xarelto     Last treatment with Carbo/Pemetrexed/Avastin 2/13/2025 C4; only cycle with Avastin            03/15/25:      Review of Systems   Constitutional:  Positive for fatigue.   HENT: Negative.     Respiratory: Negative.     Cardiovascular: Negative.    Gastrointestinal: Negative.    Musculoskeletal:  Positive for back pain and myalgias.   Skin: Negative.    Neurological:  Positive for weakness.   Hematological: Negative.    Psychiatric/Behavioral: Negative.             Objective   There were no vitals taken for this visit.    Pain Screening:     ECOG   1-2  Physical Exam  Vitals reviewed.   Constitutional:       Appearance: She is ill-appearing.   HENT:      Head: Normocephalic.      Nose: Nose normal.      Mouth/Throat:      Mouth: Mucous membranes are moist.      Pharynx: Oropharynx is clear.   Eyes:      Pupils: Pupils are equal, round, and reactive to light.   Cardiovascular:      Rate and Rhythm: Normal rate.      Pulses: Normal pulses.   Pulmonary:      Effort: Pulmonary effort is normal.   Abdominal:      General: Bowel sounds are normal.   Musculoskeletal:         General: Normal range of motion.      Cervical back: Normal range of motion.   Skin:     General: Skin is warm.   Neurological:      General: No focal deficit present.      Mental Status: She is alert.   Psychiatric:         Mood and Affect: Mood normal.         Labs: I have reviewed the following labs:  Lab Results   Component Value Date/Time    WBC 8.30 03/03/2025 02:55 PM    RBC 3.21 (L) 03/03/2025 02:55 PM    Hemoglobin 9.3 (L) 03/03/2025 02:55 PM    Hematocrit 30.5 (L) 03/03/2025 02:55 PM    MCV 95 03/03/2025 02:55 PM    MCH 29.0 03/03/2025 02:55 PM    RDW 16.6 (H) 03/03/2025 02:55 PM    Platelets 595 (H) 03/03/2025 02:55 PM    Segmented % 71 02/19/2025 04:18 AM    Lymphocytes % 9 (L) 03/03/2025 02:55 PM    Lymphocytes % 21 02/19/2025 04:18 AM    Monocytes % 9 03/03/2025 02:55 PM    Monocytes % 5 02/19/2025 04:18 AM    Eosinophils % 1 03/03/2025 02:55 PM    Eosinophils Relative 1 02/19/2025 04:18 AM    Basophils % 0 03/03/2025 02:55  PM    Basophils Relative 1 02/19/2025 04:18 AM    Immature Grans % 1 02/19/2025 04:18 AM    Absolute Neutrophils 1.50 (L) 02/19/2025 04:18 AM     Lab Results   Component Value Date/Time    Potassium 4.4 03/03/2025 02:55 PM    Chloride 105 03/03/2025 02:55 PM    CO2 24 03/03/2025 02:55 PM    BUN 18 03/03/2025 02:55 PM    Creatinine 1.10 03/03/2025 02:55 PM    Glucose, Fasting 97 02/10/2025 09:20 AM    Calcium 9.4 03/03/2025 02:55 PM    Corrected Calcium 8.8 02/17/2025 01:00 AM    AST 12 (L) 03/03/2025 02:55 PM    ALT 12 03/03/2025 02:55 PM    Alkaline Phosphatase 58 03/03/2025 02:55 PM    Total Protein 7.1 03/03/2025 02:55 PM    Albumin 3.8 03/03/2025 02:55 PM    Total Bilirubin 0.17 (L) 03/03/2025 02:55 PM    eGFR 49 03/03/2025 02:55 PM           Administrative Statements   I have spent a total time of 40 minutes in caring for this patient on the day of the visit/encounter including Impressions, Counseling / Coordination of care, Documenting in the medical record, Reviewing/placing orders in the medical record (including tests, medications, and/or procedures), and Obtaining or reviewing history  .

## 2025-03-17 ENCOUNTER — OFFICE VISIT (OUTPATIENT)
Dept: HEMATOLOGY ONCOLOGY | Facility: CLINIC | Age: 75
End: 2025-03-17
Payer: MEDICARE

## 2025-03-17 ENCOUNTER — TELEPHONE (OUTPATIENT)
Dept: HEMATOLOGY ONCOLOGY | Facility: CLINIC | Age: 75
End: 2025-03-17

## 2025-03-17 VITALS
RESPIRATION RATE: 17 BRPM | HEIGHT: 62 IN | OXYGEN SATURATION: 95 % | TEMPERATURE: 97.6 F | DIASTOLIC BLOOD PRESSURE: 88 MMHG | HEART RATE: 86 BPM | WEIGHT: 117 LBS | SYSTOLIC BLOOD PRESSURE: 120 MMHG | BODY MASS INDEX: 21.53 KG/M2

## 2025-03-17 DIAGNOSIS — C79.9 METASTATIC ADENOCARCINOMA (HCC): Primary | ICD-10-CM

## 2025-03-17 DIAGNOSIS — C34.11 MALIGNANT NEOPLASM OF UPPER LOBE, RIGHT BRONCHUS OR LUNG (HCC): Primary | ICD-10-CM

## 2025-03-17 DIAGNOSIS — Z86.19 HISTORY OF PNEUMOCYSTIS JIROVECII PNEUMONIA: ICD-10-CM

## 2025-03-17 DIAGNOSIS — D47.3 ESSENTIAL THROMBOCYTOSIS (HCC): ICD-10-CM

## 2025-03-17 DIAGNOSIS — E03.9 HYPOTHYROIDISM, UNSPECIFIED TYPE: ICD-10-CM

## 2025-03-17 DIAGNOSIS — Z15.89 JAK2 V617F MUTATION: ICD-10-CM

## 2025-03-17 PROCEDURE — 99215 OFFICE O/P EST HI 40 MIN: CPT | Performed by: INTERNAL MEDICINE

## 2025-03-17 NOTE — TELEPHONE ENCOUNTER
Patient saw Dr. Castro today. She will be started on a new treatment plan. Please schedule patient. Can use already scheduled appointments.

## 2025-03-17 NOTE — PATIENT INSTRUCTIONS
CT chest abdomen pelvis with contrast and MRI right hip in one week    Labs prior to chemo    Taxotere every 3 weeks

## 2025-03-18 ENCOUNTER — HOSPITAL ENCOUNTER (OUTPATIENT)
Dept: MRI IMAGING | Facility: HOSPITAL | Age: 75
Discharge: HOME | End: 2025-03-18
Attending: INTERNAL MEDICINE
Payer: MEDICARE

## 2025-03-18 DIAGNOSIS — C79.31 METASTASIS TO BRAIN (HCC): ICD-10-CM

## 2025-03-18 DIAGNOSIS — C34.91 NSCLC OF RIGHT LUNG (HCC): ICD-10-CM

## 2025-03-18 DIAGNOSIS — C79.9 METASTATIC ADENOCARCINOMA (HCC): Primary | ICD-10-CM

## 2025-03-18 PROCEDURE — 70553 MRI BRAIN STEM W/O & W/DYE: CPT

## 2025-03-18 PROCEDURE — A9585 GADOBUTROL INJECTION: HCPCS | Performed by: INTERNAL MEDICINE

## 2025-03-18 RX ORDER — GADOBUTROL 604.72 MG/ML
10 INJECTION INTRAVENOUS
Status: COMPLETED | OUTPATIENT
Start: 2025-03-18 | End: 2025-03-18

## 2025-03-18 RX ADMIN — GADOBUTROL 10 ML: 604.72 INJECTION INTRAVENOUS at 21:29

## 2025-03-19 ENCOUNTER — PATIENT MESSAGE (OUTPATIENT)
Age: 75
End: 2025-03-19

## 2025-03-19 DIAGNOSIS — T45.1X5A COMPLICATION OF CHEMOTHERAPY, INITIAL ENCOUNTER: ICD-10-CM

## 2025-03-19 DIAGNOSIS — L40.50 PSORIATIC ARTHRITIS (HCC): ICD-10-CM

## 2025-03-19 DIAGNOSIS — Z51.5 PALLIATIVE CARE PATIENT: ICD-10-CM

## 2025-03-19 DIAGNOSIS — C49.5: ICD-10-CM

## 2025-03-19 DIAGNOSIS — G89.3 CANCER ASSOCIATED PAIN: ICD-10-CM

## 2025-03-19 DIAGNOSIS — C79.31 METASTATIC CANCER TO BRAIN (HCC): Primary | ICD-10-CM

## 2025-03-19 DIAGNOSIS — C79.9 METASTATIC ADENOCARCINOMA (HCC): ICD-10-CM

## 2025-03-19 RX ORDER — HYDROMORPHONE HYDROCHLORIDE 2 MG/1
2 TABLET ORAL EVERY 4 HOURS PRN
Qty: 90 TABLET | Refills: 0 | Status: SHIPPED | OUTPATIENT
Start: 2025-03-19

## 2025-03-20 RX ORDER — PALONOSETRON 0.05 MG/ML
0.25 INJECTION, SOLUTION INTRAVENOUS ONCE
OUTPATIENT
Start: 2025-04-03

## 2025-03-20 RX ORDER — SODIUM CHLORIDE 9 MG/ML
20 INJECTION, SOLUTION INTRAVENOUS ONCE
OUTPATIENT
Start: 2025-04-03

## 2025-03-21 ENCOUNTER — APPOINTMENT (OUTPATIENT)
Dept: LAB | Facility: HOSPITAL | Age: 75
End: 2025-03-21
Payer: MEDICARE

## 2025-03-21 ENCOUNTER — TELEPHONE (OUTPATIENT)
Age: 75
End: 2025-03-21

## 2025-03-21 ENCOUNTER — HOSPITAL ENCOUNTER (OUTPATIENT)
Dept: CT IMAGING | Facility: HOSPITAL | Age: 75
End: 2025-03-21
Payer: MEDICARE

## 2025-03-21 DIAGNOSIS — R31.9 HEMATURIA, UNSPECIFIED TYPE: Primary | ICD-10-CM

## 2025-03-21 DIAGNOSIS — R31.9 HEMATURIA, UNSPECIFIED TYPE: ICD-10-CM

## 2025-03-21 DIAGNOSIS — C34.11 MALIGNANT NEOPLASM OF UPPER LOBE, RIGHT BRONCHUS OR LUNG (HCC): ICD-10-CM

## 2025-03-21 LAB
BACTERIA UR QL AUTO: NORMAL /HPF
BILIRUB UR QL STRIP: NEGATIVE
CLARITY UR: CLEAR
COLOR UR: YELLOW
GLUCOSE UR STRIP-MCNC: NEGATIVE MG/DL
HGB UR QL STRIP.AUTO: ABNORMAL
KETONES UR STRIP-MCNC: NEGATIVE MG/DL
LEUKOCYTE ESTERASE UR QL STRIP: NEGATIVE
NITRITE UR QL STRIP: NEGATIVE
NON-SQ EPI CELLS URNS QL MICRO: NORMAL /HPF
PH UR STRIP.AUTO: 6 [PH]
PROT UR STRIP-MCNC: NEGATIVE MG/DL
RBC #/AREA URNS AUTO: NORMAL /HPF
SP GR UR STRIP.AUTO: 1.01 (ref 1–1.03)
UROBILINOGEN UR QL STRIP.AUTO: 0.2 E.U./DL
WBC #/AREA URNS AUTO: NORMAL /HPF

## 2025-03-21 PROCEDURE — 81003 URINALYSIS AUTO W/O SCOPE: CPT

## 2025-03-21 PROCEDURE — 71260 CT THORAX DX C+: CPT

## 2025-03-21 PROCEDURE — 81001 URINALYSIS AUTO W/SCOPE: CPT

## 2025-03-21 PROCEDURE — 74177 CT ABD & PELVIS W/CONTRAST: CPT

## 2025-03-21 RX ADMIN — IOHEXOL 50 ML: 350 INJECTION, SOLUTION INTRAVENOUS at 12:35

## 2025-03-21 NOTE — TELEPHONE ENCOUNTER
Called Ayla.   She is going to stop by the lab to have the UA collected while she is there for her CT scan. I let her know we would call her with results. I let her know in the meantime with any new/worsening symptoms, to give us a call. Patient voiced understanding.

## 2025-03-21 NOTE — TELEPHONE ENCOUNTER
Patient returned call, she reports 1 episode of bright red blood after urination last night, she denies any pain or other urinary complaints.  She reports has voided this morning and without any blood or any other urinary symptoms and it was only the one episode. She would like Dr. Castro updated.  I told her that I will send this to her care team. She thanked me.

## 2025-03-21 NOTE — TELEPHONE ENCOUNTER
Phone call placed to the patient in response to her [employer] message. I left a message on her vm explaining the reason for my call and to return call to discuss.  Hope Line number left on .

## 2025-03-21 NOTE — TELEPHONE ENCOUNTER
Ayla [facility]as [facility] (supporting Rosalinda Castro MD)         3/20/25 11:55 PM  I went to urinate at 11pm tonight thde water in the bowl was red. The toilet paper also has blood on it. I am having a CTscan on my pelvis and abdomen tomorrow  Friday,

## 2025-03-24 ENCOUNTER — RESULTS FOLLOW-UP (OUTPATIENT)
Dept: RADIATION ONCOLOGY | Facility: CLINIC | Age: 75
End: 2025-03-24

## 2025-03-24 ENCOUNTER — TELEPHONE (OUTPATIENT)
Dept: PALLIATIVE MEDICINE | Facility: CLINIC | Age: 75
End: 2025-03-24

## 2025-03-24 NOTE — TELEPHONE ENCOUNTER
PA for HYDROMORPHONE 2 MG SUBMITTED to [employer]     via    [x]CMM-KEY: [id number]      [x]PA sent as URGENT    All office notes, labs and other pertaining documents and studies sent. Clinical questions answered. Awaiting determination from insurance company.     Turnaround time for your insurance to make a decision on your Prior Authorization can take 7-21 business days.

## 2025-03-24 NOTE — TELEPHONE ENCOUNTER
PA for HYDROMORPHONE 2 MG  APPROVED     Date(s) approved UNTIL 12/31/2025        Patient advised by          [x]MyChart Message  []Phone call   []LMOM  []L/M to call office as no active Communication consent on file  [x]Unable to leave detailed message as VM not approved on Communication consent       Pharmacy advised by    [x]Fax  []Phone call  []Secure Chat    Specialty Pharmacy    []     Approval letter scanned into [employer] Yes

## 2025-03-24 NOTE — TELEPHONE ENCOUNTER
Called and spoke to Ayla. Her urinalysis results came back mostly normal with a small amount of blood detected. She is on a blood thinner and maybe had some irritation that caused her to bleed. Ayla states she has been having some right pelvic pain, which she attributes to her cancer, but she is also wondering if maybe she has a kidney stone. CT scan results are still pending. If her pain should worsen or she develops and more bleeding, she should let us know. Ayla verbalized understanding.

## 2025-03-24 NOTE — TELEPHONE ENCOUNTER
Prior Authorization NEEDED for Hydromorphone HCl 2MG Tablets     QTY 90   Pharmacy:[facility]   Phone [phone]  Fax [phone]

## 2025-03-25 ENCOUNTER — APPOINTMENT (OUTPATIENT)
Dept: LAB | Facility: CLINIC | Age: 75
End: 2025-03-25
Payer: MEDICARE

## 2025-03-25 DIAGNOSIS — C79.9 METASTATIC ADENOCARCINOMA (HCC): ICD-10-CM

## 2025-03-25 LAB
ALBUMIN SERPL BCG-MCNC: 3.9 G/DL (ref 3.5–5)
ALP SERPL-CCNC: 47 U/L (ref 34–104)
ALT SERPL W P-5'-P-CCNC: 7 U/L (ref 7–52)
ANION GAP SERPL CALCULATED.3IONS-SCNC: 7 MMOL/L (ref 4–13)
ANISOCYTOSIS BLD QL SMEAR: PRESENT
AST SERPL W P-5'-P-CCNC: 10 U/L (ref 13–39)
BASOPHILS # BLD MANUAL: 0 THOUSAND/UL (ref 0–0.1)
BASOPHILS NFR MAR MANUAL: 0 % (ref 0–1)
BILIRUB SERPL-MCNC: 0.28 MG/DL (ref 0.2–1)
BUN SERPL-MCNC: 21 MG/DL (ref 5–25)
CALCIUM SERPL-MCNC: 9.4 MG/DL (ref 8.4–10.2)
CHLORIDE SERPL-SCNC: 105 MMOL/L (ref 96–108)
CO2 SERPL-SCNC: 26 MMOL/L (ref 21–32)
CREAT SERPL-MCNC: 1.09 MG/DL (ref 0.6–1.3)
EOSINOPHIL # BLD MANUAL: 0 THOUSAND/UL (ref 0–0.4)
EOSINOPHIL NFR BLD MANUAL: 0 % (ref 0–6)
ERYTHROCYTE [DISTWIDTH] IN BLOOD BY AUTOMATED COUNT: 17.3 % (ref 11.6–15.1)
GFR SERPL CREATININE-BSD FRML MDRD: 50 ML/MIN/1.73SQ M
GLUCOSE P FAST SERPL-MCNC: 97 MG/DL (ref 65–99)
HCT VFR BLD AUTO: 33.3 % (ref 34.8–46.1)
HGB BLD-MCNC: 9.9 G/DL (ref 11.5–15.4)
LDH SERPL-CCNC: 385 U/L (ref 140–271)
LG PLATELETS BLD QL SMEAR: PRESENT
LYMPHOCYTES # BLD AUTO: 0.83 THOUSAND/UL (ref 0.6–4.47)
LYMPHOCYTES # BLD AUTO: 6 % (ref 14–44)
MAGNESIUM SERPL-MCNC: 2.1 MG/DL (ref 1.9–2.7)
MCH RBC QN AUTO: 28.8 PG (ref 26.8–34.3)
MCHC RBC AUTO-ENTMCNC: 29.7 G/DL (ref 31.4–37.4)
MCV RBC AUTO: 97 FL (ref 82–98)
MONOCYTES # BLD AUTO: 0.42 THOUSAND/UL (ref 0–1.22)
MONOCYTES NFR BLD: 3 % (ref 4–12)
MYELOCYTE ABSOLUTE CT: 0.42 THOUSAND/UL (ref 0–0.1)
MYELOCYTES NFR BLD MANUAL: 3 % (ref 0–1)
NEUTROPHILS # BLD MANUAL: 12.22 THOUSAND/UL (ref 1.85–7.62)
NEUTS BAND NFR BLD MANUAL: 1 % (ref 0–8)
NEUTS SEG NFR BLD AUTO: 87 % (ref 43–75)
PLATELET # BLD AUTO: 367 THOUSANDS/UL (ref 149–390)
PLATELET BLD QL SMEAR: ADEQUATE
PMV BLD AUTO: 8.9 FL (ref 8.9–12.7)
POTASSIUM SERPL-SCNC: 4.6 MMOL/L (ref 3.5–5.3)
PROT SERPL-MCNC: 7 G/DL (ref 6.4–8.4)
RBC # BLD AUTO: 3.44 MILLION/UL (ref 3.81–5.12)
RBC MORPH BLD: PRESENT
SODIUM SERPL-SCNC: 138 MMOL/L (ref 135–147)
WBC # BLD AUTO: 13.89 THOUSAND/UL (ref 4.31–10.16)

## 2025-03-25 PROCEDURE — 85007 BL SMEAR W/DIFF WBC COUNT: CPT

## 2025-03-25 PROCEDURE — 83735 ASSAY OF MAGNESIUM: CPT

## 2025-03-25 PROCEDURE — 36415 COLL VENOUS BLD VENIPUNCTURE: CPT

## 2025-03-25 PROCEDURE — 80053 COMPREHEN METABOLIC PANEL: CPT

## 2025-03-25 PROCEDURE — 83615 LACTATE (LD) (LDH) ENZYME: CPT

## 2025-03-25 PROCEDURE — 85027 COMPLETE CBC AUTOMATED: CPT

## 2025-03-26 DIAGNOSIS — C79.31 METASTATIC CANCER TO BRAIN (HCC): ICD-10-CM

## 2025-03-27 ENCOUNTER — TELEPHONE (OUTPATIENT)
Dept: INFUSION CENTER | Facility: HOSPITAL | Age: 75
End: 2025-03-27

## 2025-03-27 ENCOUNTER — HOSPITAL ENCOUNTER (OUTPATIENT)
Dept: INFUSION CENTER | Facility: HOSPITAL | Age: 75
Discharge: HOME | End: 2025-03-27
Attending: INTERNAL MEDICINE

## 2025-03-27 ENCOUNTER — OFFICE VISIT (OUTPATIENT)
Dept: RADIATION ONCOLOGY | Facility: HOSPITAL | Age: 75
End: 2025-03-27
Attending: INTERNAL MEDICINE
Payer: MEDICARE

## 2025-03-27 VITALS
BODY MASS INDEX: 21.53 KG/M2 | TEMPERATURE: 97.9 F | HEART RATE: 86 BPM | SYSTOLIC BLOOD PRESSURE: 160 MMHG | WEIGHT: 117 LBS | DIASTOLIC BLOOD PRESSURE: 100 MMHG | OXYGEN SATURATION: 97 % | RESPIRATION RATE: 18 BRPM | HEIGHT: 62 IN

## 2025-03-27 DIAGNOSIS — C34.11 MALIGNANT NEOPLASM OF UPPER LOBE, RIGHT BRONCHUS OR LUNG (HCC): ICD-10-CM

## 2025-03-27 DIAGNOSIS — C79.9 METASTATIC ADENOCARCINOMA (HCC): Primary | ICD-10-CM

## 2025-03-27 DIAGNOSIS — C79.31 METASTASIS TO BRAIN (HCC): Primary | ICD-10-CM

## 2025-03-27 PROCEDURE — 99211 OFF/OP EST MAY X REQ PHY/QHP: CPT | Performed by: INTERNAL MEDICINE

## 2025-03-27 PROCEDURE — 99213 OFFICE O/P EST LOW 20 MIN: CPT | Performed by: INTERNAL MEDICINE

## 2025-03-27 RX ORDER — LEVETIRACETAM 1000 MG/1
1000 TABLET ORAL 2 TIMES DAILY
Qty: 60 TABLET | Refills: 5 | Status: SHIPPED | OUTPATIENT
Start: 2025-03-27

## 2025-03-27 NOTE — PROGRESS NOTES
Follow-up Visit   Name: Ayla Nye      : 1950      MRN: [id number]  Encounter Provider: Honey Gamboa MD  Encounter Date: 3/27/2025   Encounter department: [facility] [place] RADIATION ONCOLOGY  :  Assessment & Plan  Metastasis to brain (HCC)  Ayla Nye is a 74 y.o. female with initially [id number] (IIIB) NSCLC of the right upper lobe, s/p concurrent chemoRT completing on 24. She subsequently developed brain metastases s/p SRS on 24 to five supra- and infratentorial enhancing lesions. She underwent palliative RT to a right gluteal mass in 2024. Most recently she underwent SRS to a left frontal lobe lesion on  25.    She presents for routine follow-up now 2 months after completion of most recent SRS.  She denies significant neurologic change.  Her recent MRI shows favorable response in the treated brain metastases and reduction of edema.  She has tapered her steroid to 2 mg twice daily thus far.  She is planned for next line chemotherapy.  Discussed continued MR surveillance, with MRI brain in 3 months  Return to clinic 1 week thereafter  4/3/25 Infusion Taxotere  25 Dr. Castro   4/10/25 Palliative care home visit  25 Infusion    Orders:    MRI brain bt w wo contrast; Future        History of Present Illness   Chief Complaint   Patient presents with    Follow-up   Pertinent Medical History    Ayla Nye 1950 is a 74 y.o. female   with initially [id number] (IIIB) NSCLC of the right upper lobe, s/p concurrent chemoRT completing on 24. She subsequently developed brain metastases s/p SRS on 24 to five supra- and infratentorial enhancing lesions. She had interval admissions related to down titration of steroids which responded to increased steroids. MRI Brain on 9/10/24 showed stability or decrease in some of the treated lesions, with a new tiny left temporal lobe lesion seen. There is unchanged enhancement along bilateral CN VII for  which the case was reviewed at [facility] on 9/11/24. The enhancement was present on prior MRIs and an LP was recommended to rule out leptomeningeal carcinomatosis, this was negative for malignancy. She also underwent palliative RT to a right gluteal mass in October 2024.     MRI brain on 12/27/24 showed a new enhancing lesion in the left frontal lobe suspicious for metastasis. Prior lesions are stable to decreased with the exception of the left occipital and paramedian parietal lobe lesions favoring posttreatment sequale/radiation effects based on perfusion imaging. There is slightly increased vasogenic edema. There is slightly decreased conspicuity of bilateral cranial nerve enhancement and IACs. Prior LP was negative.      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     She elected to proceed with SRS and completed a single fx treatment on 1/16/25. She returns today for 2 month follow-up with repeat MRI brain. She was scheduled to start Taxotere chemotherapy on 3/2725. Pt not feeling well. Taxotere chemotherapy moved to 4/3/25        1/23 - 1/28/25 Hosp admission  Presented with back pain and fever. Admitted with sepsis, started on Rocephin and vanco.  Infectious disease and oncology were consulted, they believe her fever and elevated Pro-Du is due to malignancy prognosis and chemotherapy.      1/23/25 CT chest abdomen pelvis  1.  Findings consistent with disease progression, with rapid interval enlargement of left lung pulmonary nodules when compared to the CT 1/14/2025, increase in right upper lobe mass, and increase in right renal mass. There has been interval decrease in   size of right ischiorectal fossa mass when compared to 11/24/2024.  2.  Bilateral perinephric stranding, with right urothelial thickening, concerning for urinary tract infection  3.  Mild T5  compression deformity, appears subacute        1/29/25 Pulmonary  December admitted with new URL infiltrate. Bal with negative bacterial culture, + PCP, heena also component of progression of disease  - sp  PCP treatment for 3 weeks, continue prophylactic dose Bactrim per ID  - continue decadron taper per onc  Acute PE in Nov/December admission  - continue AC indefinitely in setting of Ca   6 month follow up        2/14 - 2/19/25 Hosp admission  Admission with fevers, elevated procalcitonin. Clear CXR.  Treated with IV cefepime, vanco, fluids and tylenol.         3/3/25 Med Onc, Dr. Castro  Patient is currently reduced dose carboplatin AUC 4, pemetrexed 300 mg/m² and Avastin 5 mg/kg that was added with last cycle she received on 2/13/2025.   Patient is planned for next treatment on 3/6/2025, however we will hold on upcoming treatment due to ongoing weakness and will reconsider chemotherapy options in about 2 weeks         3/11/25 CTA chest PE study  No acute pulmonary embolism   Mild interval decrease in the size of right upper lobe mass lesion and surrounding posttreatment changes compared to 1/24/2025. However, there is interval increase in the size of 2 left lung nodules and the right lower lobe pulmonary nodule, compared to 1/24/2025, concerning for worsening metastasis   A new 2 mm subpleural left upper lobe nodule, which can be neoplastic or postinflammatory. Continued attention on follow-up CT chest in 3 months is recommended   .Partially visualized right renal upper pole mass lesion, is incompletely characterized.  Pending lab work and imaging, will likely consider switching chemotherapy to Taxotere versus further dose reduction in current regimen.  Again has headaches with steroid taper, will go back to 4 mg decadron in am and 2 mg at night  Will consider single agent salvage Taxotere         3/6/25 Palliative care home visit        3/17/25 Dr. Castro  Pt has not had treatment since last admission in  mid Feb. Too toxic for patient; every treatment has been admitted.  Pt has increased right gluteal pain-will reimage also will repeat CT CAP.  Current deccadron at 2 mg bid and will not decrease; no headaches   Has been using Dilaudid for pain at 2 mg-will increase to 4 mg as states not helpful   Will start second line therapy with Taxotere single agent 75 mg/m2 every 3 weeks         3/18/25 MRI brain BT   1.  Evidence of treatment response with diminishing vasogenic edema in the left cerebral hemisphere and diminishing size of scattered metastases. No new or progressive metastases. Continued clinical and imaging surveillance advised.  2.  Moderate, chronic microangiopathy redemonstrated.           Upcoming:     3/28/25 MRI pelvis  4/3/25 Infusion Taxotere  4/7/25 Dr. Castro   4/10/25 Palliative care home visit  4/17/25 Infusion     Feels well. No new neurologic symptoms. Buttock pain is better controlled s/p RT and medication. Presents with son.     Oncology History   Cancer Staging   Metastatic adenocarcinoma (HCC)  Staging form: Lung, AJCC 8th Edition  - Clinical stage from 4/15/2024: Stage IIIB (cT2b, cN3, cM0) - Signed by Rogelio Beebe DO on 4/15/2024  - Clinical: Stage IV (cM1) - Signed by Honey Gamboa MD on 9/23/2024  Oncology History   Metastatic adenocarcinoma (HCC)   2024 Initial Diagnosis    Primary lung adenocarcinoma, right (HCC)     3/26/2024 Biopsy    A-C. Lymph Node, Level 4R :    - Metastatic non-small cell carcinoma, most compatible with a primary lung adenocarcinoma; see note.       D-F. Lymph Node, Level 10R:    - Metastatic non-small cell carcinoma; see note.       G-I. Lymph Node, Level 4L:    - Metastatic non-small cell carcinoma; see note.       4/15/2024 -  Cancer Staged    Staging form: Lung, AJCC 8th Edition  - Clinical stage from 4/15/2024: Stage IIIB (cT2b, cN3, cM0) - Signed by Rogelio Beebe DO on 4/15/2024       5/23/2024 - 7/2/2024 Chemotherapy    alteplase  (CATHFLO), 2 mg, Intracatheter, Every 1 Minute as needed, 6 of 6 cycles  CARBOplatin (PARAPLATIN) IVPB (GOG AUC DOSING), 130.4 mg, Intravenous, Once, 6 of 6 cycles  Administration: 130.4 mg (5/23/2024), 144.8 mg (5/30/2024), 138.4 mg (6/6/2024), 144.8 mg (6/13/2024), 132 mg (6/21/2024), 143.6 mg (7/2/2024)  PACLItaxel (TAXOL) chemo IVPB, 45 mg/m2 = 67.2 mg (90 % of original dose 50 mg/m2), Intravenous, Once, 6 of 6 cycles  Dose modification: 45 mg/m2 (original dose 50 mg/m2, Cycle 1, Reason: Anticipated Tolerance)  Administration: 67.2 mg (5/23/2024), 67.2 mg (5/30/2024), 67.2 mg (6/6/2024), 67.2 mg (6/13/2024), 67.2 mg (6/21/2024), 67.2 mg (7/2/2024)     5/23/2024 - 7/5/2024 Radiation    Treatment:  Course: C1    Plan ID Energy Fractions Dose per Fraction (cGy) Dose Correction (cGy) Total Dose Delivered (cGy) Elapsed Days   R Lung_Hilum 6X 30 / 30 200 0 6,000 43      Treatment dates:  C1: 5/23/2024 - 7/5/2024 8/8/2024 - 8/8/2024 Radiation    SRS 5 PTVs   2000 cGy     9/12/2024 Biopsy    Mass, Superficial perianal mass:  - Squamous cell carcinoma.     Note: The patient's prior lung EBUS sampling shows the tumor to stain for TTF1 and Napsin with absent p40 expression.  The current perianal mass sampling shows diffuse p40 expression with absent TTF1 expression.  This may represent a primary anal/perianal squamous cell carcinoma versus a possible metastatic combined lung tumor (adenocarcinoma and previously unsampled squamous component).  Suggest clinical correlation and appropriate follow-up.         9/23/2024 -  Cancer Staged    Staging form: Lung, AJCC 8th Edition  - Clinical: Stage IV (cM1) - Signed by Honey Gamboa MD on 9/23/2024       10/1/2024 - 10/18/2024 Radiation    Course: C3    Plan ID Energy Fractions Dose per Fraction (cGy) Dose Correction (cGy) Total Dose Delivered (cGy) Elapsed Days   R Gluteus:1 10X/6X 14 / 15 300 0 4,200 17      Treatment Dates:  10/1/2024 - 10/18/2024.       10/7/2024 -  2/13/2025 Chemotherapy    cyanocobalamin, 1,000 mcg, Intramuscular, Once, 2 of 3 cycles  Administration: 1,000 mcg (8/19/2024), 1,000 mcg (1/23/2025)  alteplase (CATHFLO), 2 mg, Intracatheter, Every 1 Minute as needed, 4 of 6 cycles  palonosetron (ALOXI), 0.25 mg, Intravenous, Once, 2 of 4 cycles  Administration: 0.25 mg (1/23/2025), 0.25 mg (2/13/2025)  fosaprepitant (EMEND) IVPB, 150 mg, Intravenous, Once, 4 of 6 cycles  Administration: 150 mg (10/7/2024), 150 mg (1/23/2025), 150 mg (2/13/2025), 150 mg (11/14/2024)  CARBOplatin (PARAPLATIN) IVPB (GOG AUC DOSING), 347 mg, Intravenous, Once, 4 of 6 cycles  Administration: 347 mg (10/7/2024), 256.8 mg (1/23/2025), 278.4 mg (2/13/2025), 346 mg (11/14/2024)  bevacizumab (AVASTIN) IVPB, 5 mg/kg = 280 mg (100 % of original dose 5 mg/kg), Intravenous, Once, 1 of 3 cycles  Dose modification: 5 mg/kg (original dose 5 mg/kg, Cycle 4, Reason: Anticipated Tolerance)  Administration: 280 mg (2/13/2025)  pemetrexed (ALIMTA) chemo infusion, 735 mg, Intravenous, Once, 4 of 6 cycles  Dose modification: 300 mg/m2 (original dose 500 mg/m2, Cycle 4, Reason: Anticipated Tolerance)  Administration: 700 mg (10/7/2024), 700 mg (1/23/2025), 468 mg (2/13/2025), 700 mg (11/14/2024)  pembrolizumab (KEYTRUDA) IVPB, 200 mg, Intravenous, Once, 2 of 2 cycles  Administration: 200 mg (11/14/2024), 200 mg (10/7/2024)     1/16/2025 - 1/16/2025 Radiation    Course: C4  Plan ID Energy Fractions Dose per Fraction (cGy) Dose Correction (cGy) Total Dose Delivered (cGy) Elapsed Days   SRS L_Front 6X-FFF 1 / 1 2,000 0 2,000 0      Treatment dates:  C4 SRS: 1/16/2025 - 1/16/2025         4/3/2025 -  Chemotherapy    DOCEtaxel (TAXOTERE) chemo infusion, 75 mg/m2 = 114 mg, 0 of 6 cycles     Metastatic cancer to brain (HCC)   7/29/2024 Initial Diagnosis    Metastatic cancer to brain (HCC)     8/8/2024 - 8/8/2024 Radiation    SRS 5 PTVs   2000 cGy     9/12/2024 Biopsy    Mass, Superficial perianal mass:  -  "Squamous cell carcinoma.     Note: The patient's prior lung EBUS sampling shows the tumor to stain for TTF1 and Napsin with absent p40 expression.  The current perianal mass sampling shows diffuse p40 expression with absent TTF1 expression.  This may represent a primary anal/perianal squamous cell carcinoma versus a possible metastatic combined lung tumor (adenocarcinoma and previously unsampled squamous component).  Suggest clinical correlation and appropriate follow-up.         10/1/2024 - 10/18/2024 Radiation    Course: C3    Plan ID Energy Fractions Dose per Fraction (cGy) Dose Correction (cGy) Total Dose Delivered (cGy) Elapsed Days   R Gluteus:1 10X/6X 14 / 15 300 0 4,200 17      Treatment Dates:  10/1/2024 - 10/18/2024.       1/16/2025 - 1/16/2025 Radiation    Course: C4  Plan ID Energy Fractions Dose per Fraction (cGy) Dose Correction (cGy) Total Dose Delivered (cGy) Elapsed Days   SRS L_Front 6X-FFF 1 / 1 2,000 0 2,000 0      Treatment dates:  C4 SRS: 1/16/2025 - 1/16/2025            Review of Systems Refer to nursing note.          Objective   /100 (BP Location: Right arm, Patient Position: Sitting)   Pulse 86   Temp 97.9 °F (36.6 °C) (Temporal)   Resp 18   Ht 5' 2\" (1.575 m)   Wt 53.1 kg (117 lb)   SpO2 97%   BMI 21.40 kg/m²     Pain Screening:  Pain Score:   6  ECOG    Physical Exam   General Appearance:  Alert, cooperative, no distress, appears stated age  Cardiovascular:  Extremities warm and well perfused  Lungs: Respirations unlabored, no cyanosis, able to speak in complete sentences without dyspnea.  Extremities: No cyanosis or edema  Skin: No generalized rash or dermatitis  Neurologic: ANOx3, speech and cognition intact.  No focal neurologic deficit.  Cranial nerves grossly intact.  Gait intact.      Administrative Statements   I have spent a total time of 20 minutes in caring for this patient on the day of the visit/encounter including Diagnostic results, Instructions for management, " "Counseling / Coordination of care, Documenting in the medical record, Reviewing/placing orders in the medical record (including tests, medications, and/or procedures), and Obtaining or reviewing history  .  Portions of the record may have been created with voice recognition software.  Occasional wrong word or \"sound a like\" substitutions may have occurred due to the inherent limitations of voice recognition software.  Read the chart carefully and recognize, using context, where substitutions have occurred.  "

## 2025-03-27 NOTE — TELEPHONE ENCOUNTER
Call received from patient. Last night started with diarrhea and vomiting and abd cramps. She has not taken anything yet for her diarrhea but is taking zofran for her vomiting. She said she is doing okay and believes this is from a virus. She rescheduled her infusion appt for today to 4/3 and is asking if she needs to get repeat blood work before her next treatment.

## 2025-03-27 NOTE — TELEPHONE ENCOUNTER
Pt called in stated that overnight she started with diarrhea and vomiting - and wishes to cx her appt today - rescheduled pt to 4/3 @ 12:30 gave hope number and instructed pt to call Dr. Castro
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
## 2025-03-27 NOTE — TELEPHONE ENCOUNTER
Called and spoke to Ayla. She is taking zofran for her nausea but has not needed to take anything for diarrhea yet. Ayla denies any fevers. I have ordered labs for her to get done next week prior to treatment. If her symptoms should worsen or she develops a fever, she should let us know. Ayla voiced understanding.

## 2025-03-28 ENCOUNTER — HOSPITAL ENCOUNTER (OUTPATIENT)
Dept: RADIOLOGY | Facility: HOSPITAL | Age: 75
Discharge: HOME | End: 2025-03-28
Attending: INTERNAL MEDICINE
Payer: MEDICARE

## 2025-03-28 DIAGNOSIS — C34.11 MALIGNANT NEOPLASM OF UPPER LOBE, RIGHT BRONCHUS OR LUNG (HCC): ICD-10-CM

## 2025-03-28 PROCEDURE — 72197 MRI PELVIS W/O & W/DYE: CPT

## 2025-03-28 PROCEDURE — A9585 GADOBUTROL INJECTION: HCPCS | Performed by: INTERNAL MEDICINE

## 2025-03-28 RX ORDER — GADOBUTROL 604.72 MG/ML
5 INJECTION INTRAVENOUS
Status: COMPLETED | OUTPATIENT
Start: 2025-03-28 | End: 2025-03-28

## 2025-03-28 RX ADMIN — GADOBUTROL 5 ML: 604.72 INJECTION INTRAVENOUS at 19:43

## 2025-03-31 ENCOUNTER — TELEPHONE (OUTPATIENT)
Age: 75
End: 2025-03-31

## 2025-03-31 NOTE — TELEPHONE ENCOUNTER
Called and spoke to Ayla. Ayla states that her abdominal pain is mainly located in her RUQ. She denies constipation, diarrhea, fevers, nausea, or blood in her stool. She states that she has about 3 formed bowel movements a day, and sometimes gets relief with her symptoms after having a bowel movement. Ayla believes that maybe her pelvic tumor is pushing on something. She did have a MRI done on 3/28 that I will try and get read. Ayla has an appointment with her PCP tomorrow and will be getting her labs drawn tomorrow as well. We will review the results of her MRI when they come back and her labs and let her know if we have any concerns with her being treated on Thursday, 4/3. I recommended trying Gas-X to see if that gives her any relief. If her symptoms should worsen or she develops blood in her stool, she should report to the ED. Ayla verbalized understanding.

## 2025-03-31 NOTE — TELEPHONE ENCOUNTER
"Received a phone call from patient.  Patient c/o \"stomach pains\"  for the last week.  Patient stated that it feels like \"gas pains and a cramping feeling.\"  Patient has been taking Tylenol during the day and Dilaudid at night and does receive relief.  Patient is scheduled to see PCP tomorrow to discuss.  Patient inquiring if ok to have treatment on 4/3.  Please call patient with further recommendations.    "

## 2025-04-01 ENCOUNTER — OFFICE VISIT (OUTPATIENT)
Age: 75
End: 2025-04-01
Payer: MEDICARE

## 2025-04-01 ENCOUNTER — APPOINTMENT (OUTPATIENT)
Dept: LAB | Facility: CLINIC | Age: 75
End: 2025-04-01
Payer: MEDICARE

## 2025-04-01 VITALS
BODY MASS INDEX: 21.75 KG/M2 | RESPIRATION RATE: 18 BRPM | HEIGHT: 62 IN | DIASTOLIC BLOOD PRESSURE: 80 MMHG | WEIGHT: 118.2 LBS | SYSTOLIC BLOOD PRESSURE: 130 MMHG | OXYGEN SATURATION: 98 % | TEMPERATURE: 97.6 F | HEART RATE: 78 BPM

## 2025-04-01 DIAGNOSIS — C79.9 METASTATIC ADENOCARCINOMA (HCC): ICD-10-CM

## 2025-04-01 DIAGNOSIS — C34.91 NSCLC OF RIGHT LUNG (HCC): ICD-10-CM

## 2025-04-01 DIAGNOSIS — C49.5: ICD-10-CM

## 2025-04-01 DIAGNOSIS — N28.89 RIGHT KIDNEY MASS: ICD-10-CM

## 2025-04-01 DIAGNOSIS — E03.9 ACQUIRED HYPOTHYROIDISM: ICD-10-CM

## 2025-04-01 DIAGNOSIS — M54.50 RIGHT LOIN PAIN: Primary | ICD-10-CM

## 2025-04-01 DIAGNOSIS — C79.31 METASTASIS TO BRAIN (HCC): ICD-10-CM

## 2025-04-01 LAB
ALBUMIN SERPL BCG-MCNC: 3.7 G/DL (ref 3.5–5)
ALP SERPL-CCNC: 52 U/L (ref 34–104)
ALT SERPL W P-5'-P-CCNC: 7 U/L (ref 7–52)
ANION GAP SERPL CALCULATED.3IONS-SCNC: 7 MMOL/L (ref 4–13)
ANISOCYTOSIS BLD QL SMEAR: PRESENT
AST SERPL W P-5'-P-CCNC: 10 U/L (ref 13–39)
BASOPHILS # BLD MANUAL: 0 THOUSAND/UL (ref 0–0.1)
BASOPHILS NFR MAR MANUAL: 0 % (ref 0–1)
BILIRUB SERPL-MCNC: 0.21 MG/DL (ref 0.2–1)
BUN SERPL-MCNC: 20 MG/DL (ref 5–25)
CALCIUM SERPL-MCNC: 9.1 MG/DL (ref 8.4–10.2)
CHLORIDE SERPL-SCNC: 107 MMOL/L (ref 96–108)
CO2 SERPL-SCNC: 26 MMOL/L (ref 21–32)
CREAT SERPL-MCNC: 1.01 MG/DL (ref 0.6–1.3)
EOSINOPHIL # BLD MANUAL: 0 THOUSAND/UL (ref 0–0.4)
EOSINOPHIL NFR BLD MANUAL: 0 % (ref 0–6)
ERYTHROCYTE [DISTWIDTH] IN BLOOD BY AUTOMATED COUNT: 16.8 % (ref 11.6–15.1)
GFR SERPL CREATININE-BSD FRML MDRD: 54 ML/MIN/1.73SQ M
GLUCOSE SERPL-MCNC: 124 MG/DL (ref 65–140)
HCT VFR BLD AUTO: 32.5 % (ref 34.8–46.1)
HGB BLD-MCNC: 9.9 G/DL (ref 11.5–15.4)
LDH SERPL-CCNC: 416 U/L (ref 140–271)
LG PLATELETS BLD QL SMEAR: PRESENT
LYMPHOCYTES # BLD AUTO: 0.49 THOUSAND/UL (ref 0.6–4.47)
LYMPHOCYTES # BLD AUTO: 5 % (ref 14–44)
MAGNESIUM SERPL-MCNC: 1.9 MG/DL (ref 1.9–2.7)
MCH RBC QN AUTO: 28.6 PG (ref 26.8–34.3)
MCHC RBC AUTO-ENTMCNC: 30.5 G/DL (ref 31.4–37.4)
MCV RBC AUTO: 94 FL (ref 82–98)
MONOCYTES # BLD AUTO: 0.2 THOUSAND/UL (ref 0–1.22)
MONOCYTES NFR BLD: 2 % (ref 4–12)
MYELOCYTE ABSOLUTE CT: 0.39 THOUSAND/UL (ref 0–0.1)
MYELOCYTES NFR BLD MANUAL: 4 % (ref 0–1)
NEUTROPHILS # BLD MANUAL: 8.75 THOUSAND/UL (ref 1.85–7.62)
NEUTS SEG NFR BLD AUTO: 89 % (ref 43–75)
NRBC BLD AUTO-RTO: 1 /100 WBC (ref 0–2)
PLATELET # BLD AUTO: 351 THOUSANDS/UL (ref 149–390)
PLATELET BLD QL SMEAR: ADEQUATE
PMV BLD AUTO: 8.9 FL (ref 8.9–12.7)
POTASSIUM SERPL-SCNC: 4.2 MMOL/L (ref 3.5–5.3)
PROT SERPL-MCNC: 6.7 G/DL (ref 6.4–8.4)
RBC # BLD AUTO: 3.46 MILLION/UL (ref 3.81–5.12)
RBC MORPH BLD: PRESENT
SODIUM SERPL-SCNC: 140 MMOL/L (ref 135–147)
WBC # BLD AUTO: 9.83 THOUSAND/UL (ref 4.31–10.16)

## 2025-04-01 PROCEDURE — 85007 BL SMEAR W/DIFF WBC COUNT: CPT

## 2025-04-01 PROCEDURE — 99214 OFFICE O/P EST MOD 30 MIN: CPT

## 2025-04-01 PROCEDURE — 83735 ASSAY OF MAGNESIUM: CPT

## 2025-04-01 PROCEDURE — G2211 COMPLEX E/M VISIT ADD ON: HCPCS

## 2025-04-01 PROCEDURE — 80053 COMPREHEN METABOLIC PANEL: CPT

## 2025-04-01 PROCEDURE — 85027 COMPLETE CBC AUTOMATED: CPT

## 2025-04-01 PROCEDURE — 83615 LACTATE (LD) (LDH) ENZYME: CPT

## 2025-04-01 PROCEDURE — 36415 COLL VENOUS BLD VENIPUNCTURE: CPT

## 2025-04-01 NOTE — ASSESSMENT & PLAN NOTE
Most likely secondary to mass on the right kidney.  He will follow-up with your oncologist.  May continue taking Tylenol and Dilaudid as needed.  If not improving getting worse get back to us otherwise follow-up as needed

## 2025-04-01 NOTE — PROGRESS NOTES
Name: Ayla Nye      : 1950      MRN: [id number]  Encounter Provider: Rafy Angelo MD  Encounter Date: 2025   Encounter department: [facility] PRIMARY CARE  :  Assessment & Plan  Right loin pain  Most likely secondary to mass on the right kidney.  He will follow-up with your oncologist.  May continue taking Tylenol and Dilaudid as needed.  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       Right kidney mass  Follow-up with your oncologist.              History of Present Illness   Patient here for sick visit.  Complaining of almost constant pain right loin area radiating to right lower quadrant of abdomen.  Patient does have mass in the right kidney and also in the pelvis Marylin rectal adnexal area.  No nausea or vomiting.  No fever or chills.  No diarrhea or constipation.  No lightheadedness or dizziness.  No chest pain or shortness of breath other than her usual breathing issues      Review of Systems   Constitutional:  Positive for fatigue. Negative for chills and fever.   HENT:  Negative for congestion, ear pain, rhinorrhea, sneezing and sore throat.    Eyes:  Negative for redness, itching and visual disturbance.   Respiratory:  Positive for shortness of breath. Negative for cough and chest tightness.    Cardiovascular:  Negative for chest pain, palpitations and leg swelling.   Gastrointestinal:  Positive for abdominal pain. Negative for blood in stool, diarrhea, nausea and vomiting.   Endocrine: Negative for cold intolerance and heat intolerance.   Genitourinary:  Negative for dysuria, frequency and urgency.   Musculoskeletal:  Negative for arthralgias, back pain and myalgias.   Skin:  Negative for color change and rash.   Neurological:  Negative for dizziness, weakness, light-headedness, numbness and headaches.   Hematological:  Does not bruise/bleed  "easily.   Psychiatric/Behavioral:  Negative for agitation, behavioral problems and confusion.        Objective   /80 (BP Location: Right arm, Patient Position: Sitting, Cuff Size: Standard)   Pulse 78   Temp 97.6 °F (36.4 °C) (Tympanic)   Resp 18   Ht 5' 2\" (1.575 m)   Wt 53.6 kg (118 lb 3.2 oz)   SpO2 98%   BMI 21.62 kg/m²      Physical Exam  Vitals and nursing note reviewed.   Constitutional:       General: She is not in acute distress.     Appearance: Normal appearance. She is well-developed. She is not ill-appearing, toxic-appearing or diaphoretic.   HENT:      Head: Normocephalic and atraumatic.      Right Ear: External ear normal.      Left Ear: External ear normal.      Nose: Nose normal.      Mouth/Throat:      Mouth: Mucous membranes are moist.      Pharynx: Oropharynx is clear. No posterior oropharyngeal erythema.   Eyes:      General: No scleral icterus.        Right eye: No discharge.         Left eye: No discharge.      Extraocular Movements: Extraocular movements intact.      Conjunctiva/sclera: Conjunctivae normal.      Pupils: Pupils are equal, round, and reactive to light.   Cardiovascular:      Rate and Rhythm: Normal rate and regular rhythm.      Pulses: Normal pulses.      Heart sounds: Normal heart sounds. No murmur heard.     No gallop.   Pulmonary:      Effort: Pulmonary effort is normal. No respiratory distress.      Breath sounds: No wheezing or rales.      Comments: Distant breath sounds  Abdominal:      General: Abdomen is flat. Bowel sounds are normal. There is no distension.      Palpations: Abdomen is soft.      Tenderness: There is no abdominal tenderness. There is right CVA tenderness. There is no left CVA tenderness or guarding.   Musculoskeletal:         General: No swelling or tenderness. Normal range of motion.      Cervical back: Normal range of motion and neck supple. No rigidity.      Right lower leg: No edema.      Left lower leg: No edema.   Lymphadenopathy:      " Cervical: No cervical adenopathy.   Skin:     General: Skin is warm and dry.      Capillary Refill: Capillary refill takes 2 to 3 seconds.      Coloration: Skin is not jaundiced or pale.      Findings: No bruising.   Neurological:      General: No focal deficit present.      Mental Status: She is alert and oriented to person, place, and time. Mental status is at baseline.      Sensory: No sensory deficit.      Gait: Gait normal.   Psychiatric:         Mood and Affect: Mood normal.         Behavior: Behavior normal.

## 2025-04-02 RX ORDER — LEVOTHYROXINE SODIUM 50 UG/1
TABLET ORAL
Qty: 30 TABLET | Refills: 5 | Status: SHIPPED | OUTPATIENT
Start: 2025-04-02

## 2025-04-03 ENCOUNTER — HOSPITAL ENCOUNTER (OUTPATIENT)
Dept: INFUSION CENTER | Facility: HOSPITAL | Age: 75
Discharge: HOME | End: 2025-04-03
Attending: INTERNAL MEDICINE
Payer: MEDICARE

## 2025-04-03 VITALS
SYSTOLIC BLOOD PRESSURE: 154 MMHG | DIASTOLIC BLOOD PRESSURE: 75 MMHG | WEIGHT: 118.83 LBS | TEMPERATURE: 97.3 F | OXYGEN SATURATION: 97 % | BODY MASS INDEX: 21.87 KG/M2 | HEART RATE: 92 BPM | HEIGHT: 62 IN | RESPIRATION RATE: 18 BRPM

## 2025-04-03 DIAGNOSIS — C79.9 METASTATIC ADENOCARCINOMA (HCC): Primary | ICD-10-CM

## 2025-04-03 PROCEDURE — 96367 TX/PROPH/DG ADDL SEQ IV INF: CPT

## 2025-04-03 PROCEDURE — 96375 TX/PRO/DX INJ NEW DRUG ADDON: CPT

## 2025-04-03 PROCEDURE — 96413 CHEMO IV INFUSION 1 HR: CPT

## 2025-04-03 RX ORDER — PALONOSETRON 0.05 MG/ML
0.25 INJECTION, SOLUTION INTRAVENOUS ONCE
Status: COMPLETED | OUTPATIENT
Start: 2025-04-03 | End: 2025-04-03

## 2025-04-03 RX ORDER — SODIUM CHLORIDE 9 MG/ML
20 INJECTION, SOLUTION INTRAVENOUS ONCE
Status: COMPLETED | OUTPATIENT
Start: 2025-04-03 | End: 2025-04-03

## 2025-04-03 RX ADMIN — PALONOSETRON HYDROCHLORIDE 0.25 MG: 0.25 INJECTION INTRAVENOUS at 12:45

## 2025-04-03 RX ADMIN — FOSAPREPITANT 150 MG: 150 INJECTION, POWDER, LYOPHILIZED, FOR SOLUTION INTRAVENOUS at 13:10

## 2025-04-03 RX ADMIN — SODIUM CHLORIDE 20 ML/HR: 0.9 INJECTION, SOLUTION INTRAVENOUS at 12:45

## 2025-04-03 RX ADMIN — DOCETAXEL 114 MG: 20 INJECTION, SOLUTION, CONCENTRATE INTRAVENOUS at 13:43

## 2025-04-03 RX ADMIN — DEXAMETHASONE SODIUM PHOSPHATE 10 MG: 10 INJECTION, SOLUTION INTRAMUSCULAR; INTRAVENOUS at 12:47

## 2025-04-03 NOTE — PROGRESS NOTES
Ayla Nye  tolerated treatment well with no complications.      Ayla Nye is aware of future appt on 4/24    AVS printed and given to Ayla Nye:  No (Declined by Ayla Nye)

## 2025-04-03 NOTE — PLAN OF CARE
Problem: Potential for Falls  Goal: Patient will remain free of falls  Description: INTERVENTIONS:- Educate patient/family on patient safety including physical limitations- Instruct patient to call for assistance with activity  - Keep Call bell within reach- Keep bed low and locked with side rails adjusted as appropriate- Keep care items and personal belongings within reach  Outcome: Progressing

## 2025-04-04 ENCOUNTER — PATIENT MESSAGE (OUTPATIENT)
Dept: HEMATOLOGY ONCOLOGY | Facility: CLINIC | Age: 75
End: 2025-04-04

## 2025-04-04 NOTE — PATIENT COMMUNICATION
Per verbal orders from PCP Dr. Angelo, he only filled as a courtesy to patient due to patient not being able to reach oncology. He would like concern addressed by oncology as they originally prescribed the medication.

## 2025-04-07 ENCOUNTER — OFFICE VISIT (OUTPATIENT)
Age: 75
End: 2025-04-07
Payer: MEDICARE

## 2025-04-07 ENCOUNTER — HOSPITAL ENCOUNTER (EMERGENCY)
Facility: HOSPITAL | Age: 75
Discharge: HOME | End: 2025-04-07
Attending: STUDENT IN AN ORGANIZED HEALTH CARE EDUCATION/TRAINING PROGRAM
Payer: MEDICARE

## 2025-04-07 ENCOUNTER — APPOINTMENT (EMERGENCY)
Dept: RADIOLOGY | Facility: HOSPITAL | Age: 75
End: 2025-04-07
Payer: MEDICARE

## 2025-04-07 ENCOUNTER — TELEPHONE (OUTPATIENT)
Age: 75
End: 2025-04-07

## 2025-04-07 VITALS
HEART RATE: 94 BPM | OXYGEN SATURATION: 94 % | DIASTOLIC BLOOD PRESSURE: 84 MMHG | TEMPERATURE: 98.3 F | RESPIRATION RATE: 17 BRPM | SYSTOLIC BLOOD PRESSURE: 136 MMHG

## 2025-04-07 VITALS
OXYGEN SATURATION: 95 % | TEMPERATURE: 97.9 F | DIASTOLIC BLOOD PRESSURE: 84 MMHG | BODY MASS INDEX: 22.34 KG/M2 | HEART RATE: 100 BPM | WEIGHT: 121.4 LBS | RESPIRATION RATE: 20 BRPM | HEIGHT: 62 IN | SYSTOLIC BLOOD PRESSURE: 129 MMHG

## 2025-04-07 DIAGNOSIS — Z51.5 PALLIATIVE CARE PATIENT: ICD-10-CM

## 2025-04-07 DIAGNOSIS — G89.3 CANCER ASSOCIATED PAIN: ICD-10-CM

## 2025-04-07 DIAGNOSIS — C79.31 LUNG CANCER METASTATIC TO BRAIN (HCC): Primary | ICD-10-CM

## 2025-04-07 DIAGNOSIS — R50.9 ELEVATED TEMPERATURE: Primary | ICD-10-CM

## 2025-04-07 DIAGNOSIS — C79.31 METASTATIC CANCER TO BRAIN (HCC): Primary | ICD-10-CM

## 2025-04-07 DIAGNOSIS — C34.90 LUNG CANCER METASTATIC TO BRAIN (HCC): Primary | ICD-10-CM

## 2025-04-07 LAB
ALBUMIN SERPL BCG-MCNC: 3.4 G/DL (ref 3.5–5)
ALP SERPL-CCNC: 44 U/L (ref 34–104)
ALT SERPL W P-5'-P-CCNC: 6 U/L (ref 7–52)
ANION GAP SERPL CALCULATED.3IONS-SCNC: 8 MMOL/L (ref 4–13)
ANISOCYTOSIS BLD QL SMEAR: PRESENT
APTT PPP: 31 SECONDS (ref 23–34)
AST SERPL W P-5'-P-CCNC: 10 U/L (ref 13–39)
BACTERIA UR QL AUTO: ABNORMAL /HPF
BASOPHILS # BLD MANUAL: 0 THOUSAND/UL (ref 0–0.1)
BASOPHILS NFR MAR MANUAL: 0 % (ref 0–1)
BILIRUB SERPL-MCNC: 0.27 MG/DL (ref 0.2–1)
BILIRUB UR QL STRIP: NEGATIVE
BUN SERPL-MCNC: 19 MG/DL (ref 5–25)
CALCIUM ALBUM COR SERPL-MCNC: 9.3 MG/DL (ref 8.3–10.1)
CALCIUM SERPL-MCNC: 8.8 MG/DL (ref 8.4–10.2)
CHLORIDE SERPL-SCNC: 103 MMOL/L (ref 96–108)
CLARITY UR: CLEAR
CO2 SERPL-SCNC: 27 MMOL/L (ref 21–32)
COLOR UR: ABNORMAL
CREAT SERPL-MCNC: 0.79 MG/DL (ref 0.6–1.3)
EOSINOPHIL # BLD MANUAL: 0 THOUSAND/UL (ref 0–0.4)
EOSINOPHIL NFR BLD MANUAL: 0 % (ref 0–6)
ERYTHROCYTE [DISTWIDTH] IN BLOOD BY AUTOMATED COUNT: 16.3 % (ref 11.6–15.1)
FLUAV AG UPPER RESP QL IA.RAPID: NEGATIVE
FLUBV AG UPPER RESP QL IA.RAPID: NEGATIVE
GFR SERPL CREATININE-BSD FRML MDRD: 73 ML/MIN/1.73SQ M
GIANT PLATELETS BLD QL SMEAR: PRESENT
GLUCOSE SERPL-MCNC: 123 MG/DL (ref 65–140)
GLUCOSE UR STRIP-MCNC: NEGATIVE MG/DL
HCT VFR BLD AUTO: 29.3 % (ref 34.8–46.1)
HGB BLD-MCNC: 9.1 G/DL (ref 11.5–15.4)
HGB UR QL STRIP.AUTO: ABNORMAL
HYALINE CASTS #/AREA URNS LPF: ABNORMAL /LPF
INR PPP: 1.13 (ref 0.85–1.19)
KETONES UR STRIP-MCNC: NEGATIVE MG/DL
LACTATE SERPL-SCNC: 1.2 MMOL/L (ref 0.5–2)
LEUKOCYTE ESTERASE UR QL STRIP: NEGATIVE
LYMPHOCYTES # BLD AUTO: 0.26 THOUSAND/UL (ref 0.6–4.47)
LYMPHOCYTES # BLD AUTO: 3 % (ref 14–44)
MCH RBC QN AUTO: 29.2 PG (ref 26.8–34.3)
MCHC RBC AUTO-ENTMCNC: 31.1 G/DL (ref 31.4–37.4)
MCV RBC AUTO: 94 FL (ref 82–98)
MICROCYTES BLD QL AUTO: PRESENT
MONOCYTES # BLD AUTO: 0 THOUSAND/UL (ref 0–1.22)
MONOCYTES NFR BLD: 0 % (ref 4–12)
MUCOUS THREADS UR QL AUTO: ABNORMAL
NEUTROPHILS # BLD MANUAL: 8.55 THOUSAND/UL (ref 1.85–7.62)
NEUTS SEG NFR BLD AUTO: 97 % (ref 43–75)
NITRITE UR QL STRIP: NEGATIVE
NON-SQ EPI CELLS URNS QL MICRO: ABNORMAL /HPF
NRBC BLD AUTO-RTO: 1 /100 WBC (ref 0–2)
PH UR STRIP.AUTO: 6 [PH]
PLATELET # BLD AUTO: 275 THOUSANDS/UL (ref 149–390)
PLATELET BLD QL SMEAR: ABNORMAL
PLATELET CLUMP BLD QL SMEAR: PRESENT
PMV BLD AUTO: 9.5 FL (ref 8.9–12.7)
POIKILOCYTOSIS BLD QL SMEAR: PRESENT
POTASSIUM SERPL-SCNC: 3.9 MMOL/L (ref 3.5–5.3)
PROCALCITONIN SERPL-MCNC: 0.19 NG/ML
PROT SERPL-MCNC: 6.4 G/DL (ref 6.4–8.4)
PROT UR STRIP-MCNC: ABNORMAL MG/DL
PROTHROMBIN TIME: 15.2 SECONDS (ref 12.3–15)
RBC # BLD AUTO: 3.12 MILLION/UL (ref 3.81–5.12)
RBC #/AREA URNS AUTO: ABNORMAL /HPF
RBC MORPH BLD: PRESENT
SARS-COV+SARS-COV-2 AG RESP QL IA.RAPID: NEGATIVE
SODIUM SERPL-SCNC: 138 MMOL/L (ref 135–147)
SP GR UR STRIP.AUTO: 1.03 (ref 1–1.03)
SPHEROCYTES BLD QL SMEAR: PRESENT
UROBILINOGEN UR STRIP-ACNC: <2 MG/DL
WBC # BLD AUTO: 8.81 THOUSAND/UL (ref 4.31–10.16)
WBC #/AREA URNS AUTO: ABNORMAL /HPF

## 2025-04-07 PROCEDURE — G2211 COMPLEX E/M VISIT ADD ON: HCPCS

## 2025-04-07 PROCEDURE — 99348 HOME/RES VST EST LOW MDM 30: CPT

## 2025-04-07 PROCEDURE — 99285 EMERGENCY DEPT VISIT HI MDM: CPT | Performed by: STUDENT IN AN ORGANIZED HEALTH CARE EDUCATION/TRAINING PROGRAM

## 2025-04-07 PROCEDURE — 87804 INFLUENZA ASSAY W/OPTIC: CPT | Performed by: STUDENT IN AN ORGANIZED HEALTH CARE EDUCATION/TRAINING PROGRAM

## 2025-04-07 PROCEDURE — 96367 TX/PROPH/DG ADDL SEQ IV INF: CPT

## 2025-04-07 PROCEDURE — 96375 TX/PRO/DX INJ NEW DRUG ADDON: CPT

## 2025-04-07 PROCEDURE — 85027 COMPLETE CBC AUTOMATED: CPT | Performed by: STUDENT IN AN ORGANIZED HEALTH CARE EDUCATION/TRAINING PROGRAM

## 2025-04-07 PROCEDURE — 96365 THER/PROPH/DIAG IV INF INIT: CPT

## 2025-04-07 PROCEDURE — 85610 PROTHROMBIN TIME: CPT | Performed by: STUDENT IN AN ORGANIZED HEALTH CARE EDUCATION/TRAINING PROGRAM

## 2025-04-07 PROCEDURE — 87040 BLOOD CULTURE FOR BACTERIA: CPT | Performed by: STUDENT IN AN ORGANIZED HEALTH CARE EDUCATION/TRAINING PROGRAM

## 2025-04-07 PROCEDURE — 71046 X-RAY EXAM CHEST 2 VIEWS: CPT

## 2025-04-07 PROCEDURE — 80053 COMPREHEN METABOLIC PANEL: CPT | Performed by: STUDENT IN AN ORGANIZED HEALTH CARE EDUCATION/TRAINING PROGRAM

## 2025-04-07 PROCEDURE — 85730 THROMBOPLASTIN TIME PARTIAL: CPT | Performed by: STUDENT IN AN ORGANIZED HEALTH CARE EDUCATION/TRAINING PROGRAM

## 2025-04-07 PROCEDURE — 83605 ASSAY OF LACTIC ACID: CPT | Performed by: STUDENT IN AN ORGANIZED HEALTH CARE EDUCATION/TRAINING PROGRAM

## 2025-04-07 PROCEDURE — 84145 PROCALCITONIN (PCT): CPT | Performed by: STUDENT IN AN ORGANIZED HEALTH CARE EDUCATION/TRAINING PROGRAM

## 2025-04-07 PROCEDURE — 85007 BL SMEAR W/DIFF WBC COUNT: CPT | Performed by: STUDENT IN AN ORGANIZED HEALTH CARE EDUCATION/TRAINING PROGRAM

## 2025-04-07 PROCEDURE — 87811 SARS-COV-2 COVID19 W/OPTIC: CPT | Performed by: STUDENT IN AN ORGANIZED HEALTH CARE EDUCATION/TRAINING PROGRAM

## 2025-04-07 PROCEDURE — 99284 EMERGENCY DEPT VISIT MOD MDM: CPT

## 2025-04-07 PROCEDURE — 36415 COLL VENOUS BLD VENIPUNCTURE: CPT | Performed by: STUDENT IN AN ORGANIZED HEALTH CARE EDUCATION/TRAINING PROGRAM

## 2025-04-07 PROCEDURE — 81001 URINALYSIS AUTO W/SCOPE: CPT | Performed by: STUDENT IN AN ORGANIZED HEALTH CARE EDUCATION/TRAINING PROGRAM

## 2025-04-07 RX ORDER — ONDANSETRON 2 MG/ML
4 INJECTION INTRAMUSCULAR; INTRAVENOUS EVERY 8 HOURS PRN
Status: DISCONTINUED | OUTPATIENT
Start: 2025-04-07 | End: 2025-04-07 | Stop reason: HOSPADM

## 2025-04-07 RX ORDER — ACETAMINOPHEN 325 MG/1
650 TABLET ORAL ONCE
Status: COMPLETED | OUTPATIENT
Start: 2025-04-07 | End: 2025-04-07

## 2025-04-07 RX ADMIN — SODIUM CHLORIDE, SODIUM LACTATE, POTASSIUM CHLORIDE, AND CALCIUM CHLORIDE 1000 ML: .6; .31; .03; .02 INJECTION, SOLUTION INTRAVENOUS at 15:00

## 2025-04-07 RX ADMIN — ONDANSETRON 4 MG: 2 INJECTION INTRAMUSCULAR; INTRAVENOUS at 15:00

## 2025-04-07 RX ADMIN — PIPERACILLIN AND TAZOBACTAM 4.5 G: 36; 4.5 INJECTION, POWDER, LYOPHILIZED, FOR SOLUTION INTRAVENOUS at 13:53

## 2025-04-07 RX ADMIN — ACETAMINOPHEN 650 MG: 325 TABLET, FILM COATED ORAL at 13:12

## 2025-04-07 NOTE — ED PROVIDER NOTES
Time reflects when diagnosis was documented in both MDM as applicable and the Disposition within this note       Time User Action Codes Description Comment    4/7/2025  3:55 PM Ruth Travis Add [R50.9] Elevated temperature           ED Disposition       ED Disposition   Discharge    Condition   Stable    Date/Time   Mon Apr 7, 2025  3:56 PM    Comment   Ayla Nye discharge to home/self care.                   Assessment & Plan       Medical Decision Making  74-year-old female with lung cancer with metastatic disease of the brain complicated by seizure disorder on AEDs, PE on AC chronically immune suppressed, who presents with fevers, myalgias, nausea, and headache with associated nonproductive cough without congestion/rhinorrhea, shortness of breath, hemoptysis, abdominal pain, dysuria/hematuria/frequency, diarrhea or any other concerns.  She took her temperature at home and it was 100.3 on oral thermometer and discussed with her physician who recommended that she present to the emergency department for further evaluation.    Vitals with tachycardia but otherwise stable and afebrile here.  Dry cough present with clear lungs and nontender abdomen.  No rash present.      Differential diagnosis includes but is not limited to: Neutropenic fever, sepsis, viral syndrome, drug reaction.  Given nontoxic appearance I have a lower clinical suspicion for meningitis/encephalitis despite headache.    Workup and treatment as below:    Imaging: See ED course  EKG: N/A  Labs: Patient appears to be immune competent on lab testing today and has no obvious lab evidence of infection.  Meds: IV fluids, antibiotics, antiemetics, analgesics  Consults: Hematology/oncology, discussed patient and recommends outpatient follow-up.  Reassessment: Patient tolerated p.o. and ambulatory challenges in the emergency department and had stable vitals at time of discharge.  Their pain was relieved.      Dispo: Patient was discharged to home with  strict return precautions. Patient acknowledged understanding of plan and diagnostic results, and all their questions were answered to their satisfaction.      Amount and/or Complexity of Data Reviewed  Labs: ordered. Decision-making details documented in ED Course.  Radiology: ordered.    Risk  OTC drugs.        ED Course as of 04/10/25 1734   Mon Apr 07, 2025   1411 WBC: 8.81   1411 Hemoglobin(!): 9.1  stable   1430 POCT INR: 1.13   1431 LACTIC ACID: 1.2   1435 FLU/COVID Rapid Antigen (30 min. TAT) - Preferred screening test in ED  wnl   1502 Procalcitonin: 0.19   1553 Bacteria, UA: None Seen   1553 Blood, UA(!): Moderate   1553 RBC Urine(!): 4-10   1553 WBC, UA: None Seen   1553 Epithelial Cells: Occasional       Medications   acetaminophen (TYLENOL) tablet 650 mg (650 mg Oral Given 4/7/25 1312)   piperacillin-tazobactam (ZOSYN) IVPB 4.5 g (0 g Intravenous Stopped 4/7/25 1443)   lactated ringers bolus 1,000 mL (0 mL Intravenous Stopped 4/7/25 1600)       ED Risk Strat Scores                    No data recorded                            History of Present Illness       Chief Complaint   Patient presents with    Fever Immunocompromised     Pt reports had infusion Thursday, states normally has reactions, onset of fevers this morning, no tylenol taken prior to arrival, also reports nausea and body aches, took zofran at home       Past Medical History:   Diagnosis Date    DILLON (acute kidney injury) (HCC) 02/14/2025    Allergic 2022    Allergic to neomiacin and cephalexin    Cancer (HCC)     lung cancer    Cancer (HCC)     mets to brain    Cancer (HCC)     perianal mass    Cataract Nov. 2023    Due to have durgery June 2024    Essential thrombocytosis (HCC)     controlled with medication    History of transfusion     Hyperlipidemia 2015    Hypertension 2011    Mass in chest     Nodular goiter     Osteoporosis     Peripheral neuropathy     Pneumonia Oct 16, 2023    Also  Feb 2024    Psoriasis     SIRS (systemic  inflammatory response syndrome) ([facility]) 2024      Past Surgical History:   Procedure Laterality Date    APPENDECTOMY      BREAST CYST EXCISION Right     COLONOSCOPY  10/31/2018    DXA PROCEDURE (HISTORICAL)  2017    IR BIOPSY OTHER  2024    IR LUMBAR PUNCTURE  2024    MAMMO (HISTORICAL)      18    MAMMO NEEDLE LOCALIZATION RIGHT (ALL INC) Right 2009    SKIN LESION EXCISION      bridge of nose      Family History   Problem Relation Age of Onset    Stroke Mother     Depression Mother             Hypertension Mother     Hearing loss Mother     Tuberculosis Father     Cancer Brother         lung    Tuberculosis Brother     Coronary artery disease Brother     Hypertension Brother     Heart disease Brother     No Known Problems Daughter     No Known Problems Daughter     No Known Problems Maternal Grandmother     No Known Problems Maternal Grandfather     No Known Problems Paternal Grandmother     No Known Problems Paternal Grandfather     No Known Problems Maternal Aunt     No Known Problems Maternal Aunt     No Known Problems Maternal Aunt     No Known Problems Maternal Aunt     No Known Problems Paternal Aunt     Cancer Brother         Throat cancer      Social History     Tobacco Use    Smoking status: Former     Current packs/day: 1.00     Average packs/day: 1 pack/day for 59.3 years (59.3 ttl pk-yrs)     Types: Cigarettes     Start date:      Passive exposure: Past    Smokeless tobacco: Never    Tobacco comments:     Quit    Vaping Use    Vaping status: Never Used   Substance Use Topics    Alcohol use: Yes     Alcohol/week: 5.0 standard drinks of alcohol     Types: 5 Glasses of wine per week    Drug use: Never      E-Cigarette/Vaping    E-Cigarette Use Never User       E-Cigarette/Vaping Substances    Nicotine No     THC No     CBD No     Flavoring No     Other No     Unknown No       I have reviewed and agree with the history as documented.     74-year-old female  with lung cancer with metastatic disease of the brain complicated by seizure disorder on AEDs, PE on AC chronically immune suppressed, who presents with fevers, myalgias, nausea, and headache with associated nonproductive cough without congestion/rhinorrhea, shortness of breath, hemoptysis, abdominal pain, dysuria/hematuria/frequency, diarrhea or any other concerns.  She took her temperature at home and it was 100.3 on oral thermometer and discussed with her physician who recommended that she present to the emergency department for further evaluation.      Fever Immunocompromised  Associated symptoms: no chest pain, no chills, no cough, no diarrhea, no dysuria, no ear pain, no headaches, no nausea, no rash, no sore throat and no vomiting        Review of Systems   Constitutional:  Positive for fever. Negative for chills.   HENT:  Negative for ear pain and sore throat.    Eyes:  Negative for pain and visual disturbance.   Respiratory:  Negative for cough and shortness of breath.    Cardiovascular:  Negative for chest pain and palpitations.   Gastrointestinal:  Negative for abdominal pain, blood in stool, constipation, diarrhea, nausea and vomiting.   Genitourinary:  Negative for dysuria and hematuria.   Musculoskeletal:  Negative for arthralgias and back pain.   Skin:  Negative for color change and rash.   Allergic/Immunologic: Positive for immunocompromised state.   Neurological:  Negative for dizziness, seizures, syncope, facial asymmetry, speech difficulty, weakness, light-headedness, numbness and headaches.   All other systems reviewed and are negative.          Objective       ED Triage Vitals   Temperature Pulse Blood Pressure Respirations SpO2 Patient Position - Orthostatic VS   04/07/25 1308 04/07/25 1308 04/07/25 1308 04/07/25 1308 04/07/25 1308 04/07/25 1502   98.3 °F (36.8 °C) (!) 109 147/82 20 93 % Sitting      Temp Source Heart Rate Source BP Location FiO2 (%) Pain Score    04/07/25 1308 04/07/25 1308  04/07/25 1308 -- --    Oral Monitor Right arm        Vitals      Date and Time Temp Pulse SpO2 Resp BP Pain Score FACES Pain Rating User   04/07/25 1502 -- 94 94 % 17 136/84 -- -- EM   04/07/25 1308 98.3 °F (36.8 °C) 109 93 % 20 147/82 -- -- KL            Physical Exam  Vitals and nursing note reviewed.   Constitutional:       General: She is not in acute distress.     Appearance: Normal appearance. She is normal weight. She is not ill-appearing or toxic-appearing.   HENT:      Head: Normocephalic.      Nose: Nose normal.      Mouth/Throat:      Mouth: Mucous membranes are moist.      Pharynx: Oropharynx is clear.   Eyes:      Conjunctiva/sclera: Conjunctivae normal.   Cardiovascular:      Rate and Rhythm: Normal rate and regular rhythm.      Heart sounds: Normal heart sounds.   Pulmonary:      Effort: Pulmonary effort is normal.      Breath sounds: Normal breath sounds.   Abdominal:      General: Abdomen is flat.      Palpations: Abdomen is soft.      Tenderness: There is no abdominal tenderness.   Skin:     General: Skin is warm and dry.      Capillary Refill: Capillary refill takes less than 2 seconds.      Findings: No rash.   Neurological:      Mental Status: She is alert and oriented to person, place, and time.   Psychiatric:         Mood and Affect: Mood normal.         Results Reviewed       Procedure Component Value Units Date/Time    Blood culture #1 [[id number]] Collected: 04/07/25 1340    Lab Status: Preliminary result Specimen: Blood from Arm, Right Updated: 04/09/25 1901     Blood Culture No Growth at 48 hrs.    Blood culture #2 [[id number]] Collected: 04/07/25 1340    Lab Status: Preliminary result Specimen: Blood from Arm, Right Updated: 04/09/25 1901     Blood Culture No Growth at 48 hrs.    RBC Morphology Reflex Test [[id number]] Collected: 04/07/25 1340    Lab Status: Final result Specimen: Blood from Arm, Right Updated: 04/07/25 1601    Urine Microscopic [[id number]]  (Abnormal) Collected: 04/07/25  1500    Lab Status: Final result Specimen: Urine, Clean Catch Updated: 04/07/25 1531     RBC, UA 4-10 /hpf      WBC, UA None Seen /hpf      Epithelial Cells Occasional /hpf      Bacteria, UA None Seen /hpf      MUCUS THREADS Occasional     Hyaline Casts, UA 0-3 /lpf     UA w Reflex to Microscopic w Reflex to Culture [[id number]]  (Abnormal) Collected: 04/07/25 1500    Lab Status: Final result Specimen: Urine, Clean Catch Updated: 04/07/25 1512     Color, UA Light Yellow     Clarity, UA Clear     Specific Gravity, UA 1.027     pH, UA 6.0     Leukocytes, UA Negative     Nitrite, UA Negative     Protein, UA 50 (1+) mg/dl      Glucose, UA Negative mg/dl      Ketones, UA Negative mg/dl      Urobilinogen, UA <2.0 mg/dl      Bilirubin, UA Negative     Occult Blood, UA Moderate    CBC and differential [[id number]]  (Abnormal) Collected: 04/07/25 1340    Lab Status: Final result Specimen: Blood from Arm, Right Updated: 04/07/25 1504     WBC 8.81 Thousand/uL      RBC 3.12 Million/uL      Hemoglobin 9.1 g/dL      Hematocrit 29.3 %      MCV 94 fL      MCH 29.2 pg      MCHC 31.1 g/dL      RDW 16.3 %      MPV 9.5 fL      Platelets 275 Thousands/uL     Narrative:      This is an appended report.  These results have been appended to a previously verified report.    Manual Differential(PHLEBS Do Not Order) [[id number]]  (Abnormal) Collected: 04/07/25 1340    Lab Status: Final result Specimen: Blood from Arm, Right Updated: 04/07/25 1504     Segmented % 97 %      Lymphocytes % 3 %      Monocytes % 0 %      Eosinophils % 0 %      Basophils % 0 %      Absolute Neutrophils 8.55 Thousand/uL      Absolute Lymphocytes 0.26 Thousand/uL      Absolute Monocytes 0.00 Thousand/uL      Absolute Eosinophils 0.00 Thousand/uL      Absolute Basophils 0.00 Thousand/uL      Total Counted --     nRBC 1 /100 WBC      RBC Morphology Present     Platelet Estimate Increased     Clumped Platelets Present     Giant PLTs Present     Anisocytosis Present      Microcytes Present     Poikilocytes Present     Spherocytes Present    Procalcitonin [[id number]]  (Normal) Collected: 04/07/25 1340    Lab Status: Final result Specimen: Blood from Arm, Right Updated: 04/07/25 1440     Procalcitonin 0.19 ng/ml     FLU/COVID Rapid Antigen (30 min. TAT) - Preferred screening test in ED [[id number]]  (Normal) Collected: 04/07/25 1340    Lab Status: Final result Specimen: Nares from Nose Updated: 04/07/25 1434     SARS COV Rapid Antigen Negative     Influenza A Rapid Antigen Negative     Influenza B Rapid Antigen Negative    Narrative:      This test has been performed using the [employer] 2 FLU+SARS Antigen test under the Emergency Use Authorization (EUA). This test has been validated by the  and verified by the performing laboratory. The Ghdaa uses lateral flow immunofluorescent sandwich assay to detect SARS-COV, Influenza A and Influenza B Antigen.     The [employer] Ghada 2 SARS Antigen test does not differentiate between SARS-CoV and SARS-CoV-2.     Negative results are presumptive and may be confirmed with a molecular assay, if necessary, for patient management. Negative results do not rule out SARS-CoV-2 or influenza infection and should not be used as the sole basis for treatment or patient management decisions. A negative test result may occur if the level of antigen in a sample is below the limit of detection of this test.     Positive results are indicative of the presence of viral antigens, but do not rule out bacterial infection or co-infection with other viruses.     All test results should be used as an adjunct to clinical observations and other information available to the provider.    FOR PEDIATRIC PATIENTS - copy/paste COVID Guidelines URL to browser: [email or web address]    Lactic acid [[id number]]  (Normal) Collected: 04/07/25 1340    Lab Status: Final result Specimen: Blood from Arm, Right Updated:  04/07/25 1430     LACTIC ACID 1.2 mmol/L     Narrative:      Result may be elevated if tourniquet was used during collection.    Comprehensive metabolic panel [[id number]]  (Abnormal) Collected: 04/07/25 1340    Lab Status: Final result Specimen: Blood from Arm, Right Updated: 04/07/25 1429     Sodium 138 mmol/L      Potassium 3.9 mmol/L      Chloride 103 mmol/L      CO2 27 mmol/L      ANION GAP 8 mmol/L      BUN 19 mg/dL      Creatinine 0.79 mg/dL      Glucose 123 mg/dL      Calcium 8.8 mg/dL      Corrected Calcium 9.3 mg/dL      AST 10 U/L      ALT 6 U/L      Alkaline Phosphatase 44 U/L      Total Protein 6.4 g/dL      Albumin 3.4 g/dL      Total Bilirubin 0.27 mg/dL      eGFR 73 ml/min/1.73sq m     Narrative:      National Kidney Disease Foundation guidelines for Chronic Kidney Disease (CKD):     Stage 1 with normal or high GFR (GFR > 90 mL/min/1.73 square meters)    Stage 2 Mild CKD (GFR = 60-89 mL/min/1.73 square meters)    Stage 3A Moderate CKD (GFR = 45-59 mL/min/1.73 square meters)    Stage 3B Moderate CKD (GFR = 30-44 mL/min/1.73 square meters)    Stage 4 Severe CKD (GFR = 15-29 mL/min/1.73 square meters)    Stage 5 End Stage CKD (GFR <15 mL/min/1.73 square meters)  Note: GFR calculation is accurate only with a steady state creatinine    Protime-INR [[id number]]  (Abnormal) Collected: 04/07/25 1340    Lab Status: Final result Specimen: Blood from Arm, Right Updated: 04/07/25 1428     Protime 15.2 seconds      INR 1.13    Narrative:      INR Therapeutic Range    Indication                                             INR Range      Atrial Fibrillation                                               2.0-3.0  Hypercoagulable State                                    2.0.2.3  Left Ventricular Asist Device                            2.0-3.0  Mechanical Heart Valve                                  -    Aortic(with afib, MI, embolism, HF, LA enlargement,    and/or coagulopathy)                                      2.0-3.0 (2.5-3.5)     Mitral                                                             2.5-3.5  Prosthetic/Bioprosthetic Heart Valve               2.0-3.0  Venous thromboembolism (VTE: VT, PE        2.0-3.0    APTT [[id number]]  (Normal) Collected: 04/07/25 1340    Lab Status: Final result Specimen: Blood from Arm, Right Updated: 04/07/25 1428     PTT 31 seconds             XR chest pa and lateral   Final Interpretation by Nathan Mcfarland MD (04/07 1442)      Persistent masslike opacity medial right upper lung field and left lateral lung base. Compared with the CT from 3/21/2025, probably no significant change in these findings.            Workstation performed: [id number]             Procedures    ED Medication and Procedure Management   Prior to Admission Medications   Prescriptions Last Dose Informant Patient Reported? Taking?   HYDROmorphone (DILAUDID) 2 mg tablet   No No   Sig: Take 1 tablet (2 mg total) by mouth every 4 (four) hours as needed for moderate pain Max Daily Amount: 12 mg   amLODIPine (NORVASC) 5 mg tablet   No No   Sig: TAKE 1 TABLET(5 MG) BY MOUTH DAILY   dexamethasone (DECADRON) 2 mg tablet   No No   Sig: Take 1 tablet (2 mg total) by mouth 2 (two) times a day with meals   gabapentin (NEURONTIN) 100 mg capsule   No No   Sig: TAKE 1 CAPSULE BY MOUTH EVERY MORNING, 1 CAPSULE EVERY AFTERNOON AND 3 CAPSULES EVERY NIGHT AT BEDTIME   Patient taking differently: TAKE 2 CAPSULES BY MOUTH EVERY MORNING AND 3 CAPSULES EVERY NIGHT AT BEDTIME   levETIRAcetam (KEPPRA) 1000 MG tablet   No No   Sig: TAKE 1 TABLET(1000 MG) BY MOUTH EVERY 12 HOURS   levothyroxine 50 mcg tablet   No No   Sig: TAKE 1 TABLET(50 MCG) BY MOUTH DAILY EARLY MORNING   ondansetron (ZOFRAN) 4 mg tablet   No No   Sig: Take 1 tablet (4 mg total) by mouth every 8 (eight) hours as needed for nausea or vomiting   rivaroxaban (XARELTO) 10 mg tablet   No No   Sig: Take 1 tablet (10 mg total) by mouth daily with breakfast   senna  (SENOKOT) 8.6 MG tablet  Self Yes No   Sig: Take 1 tablet by mouth daily Takes one every other day   sodium chloride (OCEAN) 0.65 % nasal spray   No No   Si spray into each nostril as needed for congestion   sulfamethoxazole-trimethoprim (BACTRIM DS) 800-160 mg per tablet   No No   Sig: Take 1 tablet by mouth 3 (three) times a week Starting , you can take one tablet Monday, Wednesday, and .   vitamin B-12 (VITAMIN B-12) 1,000 mcg tablet  Self No No   Sig: Take 1 tablet (1,000 mcg total) by mouth daily      Facility-Administered Medications: None     Discharge Medication List as of 2025  3:56 PM        CONTINUE these medications which have NOT CHANGED    Details   amLODIPine (NORVASC) 5 mg tablet TAKE 1 TABLET(5 MG) BY MOUTH DAILY, Starting 2025, Normal      dexamethasone (DECADRON) 2 mg tablet Take 1 tablet (2 mg total) by mouth 2 (two) times a day with meals, Starting Thu 3/13/2025, Normal      gabapentin (NEURONTIN) 100 mg capsule TAKE 1 CAPSULE BY MOUTH EVERY MORNING, 1 CAPSULE EVERY AFTERNOON AND 3 CAPSULES EVERY NIGHT AT BEDTIME, Normal      HYDROmorphone (DILAUDID) 2 mg tablet Take 1 tablet (2 mg total) by mouth every 4 (four) hours as needed for moderate pain Max Daily Amount: 12 mg, Starting Wed 3/19/2025, Normal      levETIRAcetam (KEPPRA) 1000 MG tablet TAKE 1 TABLET(1000 MG) BY MOUTH EVERY 12 HOURS, Starting Thu 3/27/2025, Normal      levothyroxine 50 mcg tablet TAKE 1 TABLET(50 MCG) BY MOUTH DAILY EARLY MORNING, Normal      ondansetron (ZOFRAN) 4 mg tablet Take 1 tablet (4 mg total) by mouth every 8 (eight) hours as needed for nausea or vomiting, Starting 2025, Normal      rivaroxaban (XARELTO) 10 mg tablet Take 1 tablet (10 mg total) by mouth daily with breakfast, Starting 2025, Normal      senna (SENOKOT) 8.6 MG tablet Take 1 tablet by mouth daily Takes one every other day, Historical Med      sodium chloride (OCEAN) 0.65 % nasal spray 1 spray into each  nostril as needed for congestion, Starting Wed 1/29/2025, Until Mon 4/7/2025 at 2359, Normal      sulfamethoxazole-trimethoprim (BACTRIM DS) 800-160 mg per tablet Take 1 tablet by mouth 3 (three) times a week Starting 12/24, you can take one tablet Monday, Wednesday, and Fridays., Starting Wed 1/29/2025, Until Mon 7/28/2025, Fill Later      vitamin B-12 (VITAMIN B-12) 1,000 mcg tablet Take 1 tablet (1,000 mcg total) by mouth daily, Starting Thu 9/14/2023, Normal      pantoprazole (PROTONIX) 40 mg tablet Take 1 tablet (40 mg total) by mouth daily in the early morning, Starting Tue 2/4/2025, Until Mon 4/7/2025, Normal           No discharge procedures on file.  ED SEPSIS DOCUMENTATION   Time reflects when diagnosis was documented in both MDM as applicable and the Disposition within this note       Time User Action Codes Description Comment    4/7/2025  3:55 PM Ruth Travis Add [R50.9] Elevated temperature                  Ruth Travis MD  04/10/25 7629

## 2025-04-07 NOTE — ASSESSMENT & PLAN NOTE
Current regimen:  Gabapentin 200mg AM,  300mg Bedtime  Dilaudid 2mg Q4hrs PRN pain. Taking Dilaudid 4mg at bedtime  Tylenol 650mg PRN  Heating pad during the day  Failed therapies:  Celebrex 200mg BID  Oxycodone- felt like she had a headache from the medication  Would like to avoid opiates  Bowel regimen to prevent OIC:  Senna

## 2025-04-07 NOTE — PROGRESS NOTES
[facility] Visit: follow up   Name: Ayla Nye      : 1950      MRN: [id number]  Encounter Provider: ZULEYMA Pollard  Encounter Date: 2025   Encounter department: [facility]    Assessment & Plan   1. Metastatic cancer to brain (HCC)  Assessment & Plan:  Follows with Dr. Castro.   Completed SRS  Immunotherapy discontinued- plan is to initiate chemo therapy.   On prednisone taper - managed by Dr. Castro.    2. Cancer associated pain  Assessment & Plan:  Current regimen:  Gabapentin 200mg AM,  300mg Bedtime  Dilaudid 2mg Q4hrs PRN pain. Taking Dilaudid 4mg at bedtime  Tylenol 650mg PRN  Heating pad during the day  Failed therapies:  Celebrex 200mg BID  Oxycodone- felt like she had a headache from the medication  Would like to avoid opiates  Bowel regimen to prevent OIC:  Senna      3. Palliative care patient  Assessment & Plan:  Ayla follows with palliative care for psychosocial support and symptom management of lung cancer with brain mets, perianal tumor   Symptoms - dyspnea on exertion, fatigue, pain, insomnia, nausea   ACP -  on file   RTC - 4 weeks    Lives alone.   Has two daughters .   Ex- is supportive and involved in care  Has brother that lives close by that helps as well.   Daughters are getting a life alert set up for patient.     No longer drives.       Subjective   Chief Complaint   Patient presents with    Follow-up     Follow up home visit for psychosocial support and symptom management secondary to palliative diagnosis of lung cancer with mets to brain, perianal tumor.        History of Present Illness     Ayla Nye is a 74 y.o. female  with Stage III NSCLC, new onset brain mets 24, completed SRS. She was found to have mass in the right anorectal fat/right ischial tuberosity per PET. She follows with [facility] Hem[facility]on[facility] , Dr. Gamboa radiation oncology.    Patient is seen in the home with  palliative RN present.  Last infusion was April 3rd----> Denies fevers thus far  Has been having nausea- using zofran PRN  No vomiting.   Tolerating food  Staying hydrated.  Gets out of breath with exertion- recovers with rest for a few minutes.   Pain controlled with dilaudid. Dilaudid 4mg at bedtime.  Using heating pad to assist with discomfort.   Tylenol helpful as well.  Has apt with Dr. Castro this afternoon.   - wants to ask about going to dentist  - needs cataract surgery   Reports that Dr. Gamboa reviewed most recent MRI results with her.   Has another MRI scheduled for end of June.  Moving bowels with use of Senna.    Has Life Alert.        Current Outpatient Medications:     amLODIPine (NORVASC) 5 mg tablet, TAKE 1 TABLET(5 MG) BY MOUTH DAILY, Disp: 30 tablet, Rfl: 5    dexamethasone (DECADRON) 2 mg tablet, Take 1 tablet (2 mg total) by mouth 2 (two) times a day with meals, Disp: 180 tablet, Rfl: 0    gabapentin (NEURONTIN) 100 mg capsule, TAKE 1 CAPSULE BY MOUTH EVERY MORNING, 1 CAPSULE EVERY AFTERNOON AND 3 CAPSULES EVERY NIGHT AT BEDTIME (Patient taking differently: TAKE 2 CAPSULES BY MOUTH EVERY MORNING AND 3 CAPSULES EVERY NIGHT AT BEDTIME), Disp: 150 capsule, Rfl: 5    HYDROmorphone (DILAUDID) 2 mg tablet, Take 1 tablet (2 mg total) by mouth every 4 (four) hours as needed for moderate pain Max Daily Amount: 12 mg, Disp: 90 tablet, Rfl: 0    levETIRAcetam (KEPPRA) 1000 MG tablet, TAKE 1 TABLET(1000 MG) BY MOUTH EVERY 12 HOURS, Disp: 60 tablet, Rfl: 5    levothyroxine 50 mcg tablet, TAKE 1 TABLET(50 MCG) BY MOUTH DAILY EARLY MORNING, Disp: 30 tablet, Rfl: 5    ondansetron (ZOFRAN) 4 mg tablet, Take 1 tablet (4 mg total) by mouth every 8 (eight) hours as needed for nausea or vomiting, Disp: 30 tablet, Rfl: 2    pantoprazole (PROTONIX) 40 mg tablet, Take 1 tablet (40 mg total) by mouth daily in the early morning, Disp: 30 tablet, Rfl: 0    rivaroxaban (XARELTO) 10 mg tablet, Take 1 tablet (10 mg total)  "by mouth daily with breakfast, Disp: 90 tablet, Rfl: 0    senna (SENOKOT) 8.6 MG tablet, Take 1 tablet by mouth daily Takes one every other day, Disp: , Rfl:     sodium chloride (OCEAN) 0.65 % nasal spray, 1 spray into each nostril as needed for congestion, Disp: 60 mL, Rfl: 1    sulfamethoxazole-trimethoprim (BACTRIM DS) 800-160 mg per tablet, Take 1 tablet by mouth 3 (three) times a week Starting 12/24, you can take one tablet Monday, Wednesday, and Fridays., Disp: 36 tablet, Rfl: 1    vitamin B-12 (VITAMIN B-12) 1,000 mcg tablet, Take 1 tablet (1,000 mcg total) by mouth daily, Disp: 90 tablet, Rfl: 1    Objective   /84 (BP Location: Left arm, Patient Position: Sitting, Cuff Size: Standard)   Pulse 100   Temp 97.9 °F (36.6 °C) (Temporal)   Resp 20   Ht 5' 2\" (1.575 m)   SpO2 95%   BMI 21.73 kg/m²   Physical Exam  Vitals and nursing note reviewed.   Constitutional:       General: She is not in acute distress.  HENT:      Head: Normocephalic and atraumatic.   Eyes:      General:         Right eye: No discharge.         Left eye: No discharge.   Cardiovascular:      Rate and Rhythm: Normal rate.   Pulmonary:      Effort: Pulmonary effort is normal. No respiratory distress.      Breath sounds: Decreased breath sounds present.   Abdominal:      General: Bowel sounds are normal. There is no distension.      Palpations: Abdomen is soft.   Musculoskeletal:         General: Normal range of motion.      Cervical back: Normal range of motion.      Right lower leg: No edema.      Left lower leg: No edema.   Skin:     General: Skin is warm and dry.   Neurological:      Mental Status: She is alert and oriented to person, place, and time.   Psychiatric:         Mood and Affect: Mood normal.         Behavior: Behavior normal.         Thought Content: Thought content normal.         Judgment: Judgment normal.          Recent labs:  Lab Results   Component Value Date/Time    SODIUM 140 04/01/2025 08:28 AM    K 4.2 " 04/01/2025 08:28 AM    BUN 20 04/01/2025 08:28 AM    CREATININE 1.01 04/01/2025 08:28 AM    GLUC 124 04/01/2025 08:28 AM    CALCIUM 9.1 04/01/2025 08:28 AM    AST 10 (L) 04/01/2025 08:28 AM    ALT 7 04/01/2025 08:28 AM    ALB 3.7 04/01/2025 08:28 AM    TP 6.7 04/01/2025 08:28 AM    EGFR 54 04/01/2025 08:28 AM     Lab Results   Component Value Date/Time    HGB 9.9 (L) 04/01/2025 08:28 AM    WBC 9.83 04/01/2025 08:28 AM     04/01/2025 08:28 AM    INR 1.25 (H) 02/14/2025 08:00 AM    PTT 34 02/14/2025 08:00 AM     Lab Results   Component Value Date/Time    ZFH3KTXQZUOH 0.560 03/03/2025 02:55 PM       Recent Imaging:  Procedure: MRI pelvis w wo contrast  Result Date: 4/2/2025  Narrative: MRI PELVIS W WO CONTRAST INDICATION:   C34.11: Malignant neoplasm of upper lobe, right bronchus or lung. COMPARISON: CT 3/21/2025 TECHNIQUE:  MR sequences were obtained of the pelvis including: Axial T1 and T2 fat sat, coronal T1 and STIR, sagittal T1 and T2 fat sat Gadolinium was not used. FINDINGS: RIGHT HIP: JOINT EFFUSION: None BONE MARROW SIGNAL AND ALIGNMENT: Normal marrow signal demonstrated without hip fracture or AVN. TROCHANTERIC BURSA: Normal. MUSCLES AND TENDONS:  Unremarkable LEFT HIP: JOINT EFFUSION: None BONE MARROW SIGNAL AND ALIGNMENT: Normal marrow signal demonstrated without hip fracture or AVN. TROCHANTERIC BURSA: Normal. MUSCLES AND TENDONS:  Unremarkable. BONY PELVIS: SI JOINTS AND SYMPHYSIS PUBIS:   Intact VISUALIZED LUMBAR SPINE:  Unremarkable OVERALL MARROW SIGNAL:  Normal, without suspicious focal lesions. PELVIC SOFT TISSUES:   Normal. SUBCUTANEOUS TISSUES: Unremarkable right gluteal soft tissues adjacent to vitamin E marker. No mass or signal abnormality identified. No abnormal post gadolinium enhancement.     Impression: Unremarkable right gluteal soft tissues adjacent to the vitamin E marker identifying the region of interest. No evidence of mass, signal abnormality or abnormal post gadolinium  enhancement. No suspected metastatic disease throughout the bony pelvis. Workstation performed: [id number]     Procedure: CT chest abdomen pelvis w contrast  Result Date: 3/27/2025  Narrative: CT CHEST, ABDOMEN AND PELVIS WITH IV CONTRAST INDICATION:   C34.11: Malignant neoplasm of upper lobe, right bronchus or lung. Metastatic right upper lobe adenocarcinoma. Systemic and radiation therapy. History of appendectomy. 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 COMPARISON: 3/11/2025 chest CT. 1/23/2025 CT of the chest, abdomen and pelvis. TECHNIQUE: CT examination of the chest, abdomen and pelvis was performed. Axial, sagittal, and coronal 2D reformatted images were created from the source data and submitted for interpretation. Dual energy technique used. Radiation dose length product (DLP) for this visit:  546.66 mGy-cm .  This examination, like all CT scans performed in the [facility], was performed utilizing techniques to minimize radiation dose exposure, including the use of iterative  reconstruction and automated exposure control. IV Contrast:  50 mL of iohexol Enteric Contrast: Enteric contrast was not administered. FINDINGS: CHEST LUNGS: Masses compared to 3/11/2025, image #series 604: Anteromedial right upper lobe: 5.0 x 2.5 cm, #70 (previously 2.6 x 1.9 cm). Evidence of central necrosis. Right lower lobe: 1.2 x 1.0 cm, #153 (previously 0.9 x 0.8 cm) Left lower lobe: 2.8 x 2.2 cm, #163 (previously 2.6 x 1.8 cm) Left lower lobe: 0.4 cm, #164 (previously 0.3 cm) Lingula: 2.9 x 2.1 cm, #163 (previously 2.3 x 1.8 cm) Left upper lobe: 0.3 cm, #66, unchanged Similar emphysema, mild left upper lobe reticular change, areas of linear scar and right paramedian posttreatment fibrosis with volume loss and traction bronchiectasis. PLEURA: Similar focal left posterior calcified  plaque. Otherwise within normal limits. HEART/GREAT VESSELS: Unchanged mild atherosclerosis and mitral annular calcification. Normal variant left vertebral artery arising from the aortic arch.. MEDIASTINUM AND SUDEEP:  Within normal limits. CHEST WALL AND LOWER NECK:   Within normal limits. ABDOMEN LIVER/BILIARY TREE:  Within normal limits. GALLBLADDER:  Within normal limits. SPLEEN:  Within normal limits. PANCREAS:  Within normal limits. ADRENAL GLANDS:  Within normal limits. KIDNEYS/URETERS: 7.1 x 5.6 x 5.5 cm (length X depth X width) ill-defined, hypoenhancing right upper pole mass (previously 6.4 x 4.5 x 5.2 cm). Small calcification. Several, similar bilateral circumscribed hypodensities too small to definitively characterize, likely cysts. Unchanged asymmetric right urothelial enhancement. No hydronephrosis, perinephric fatty stranding or fluid. STOMACH AND BOWEL: Unchanged mild colonic diverticulosis. Normal caliber and wall thickness. APPENDIX: Not visualized.  No secondary signs of appendicitis. ABDOMINOPELVIC CAVITY:  No abnormal air, fluid or enlarged lymph nodes. VESSELS: Unchanged mild atherosclerosis. Normal aortic caliber. Right renal vein is patent. PELVIS: REPRODUCTIVE ORGANS:  Within normal limits for age. URINARY BLADDER:  Within normal limits. ABDOMINAL WALL: 2.9 x 2.1 cm right ischio rectal fossa mass (previously 3.0 x 2.8 cm. 301/227). BONES: Persistent degenerative changes, scoliosis and mild T5 compression fracture.     Impression: Chest: Compared to 3/11/2024, increased size of pulmonary metastases. Abdomen and pelvis: Increased size of right renal mass compared to 1/23/2025. Metastatic disease versus primary malignancy. Mildly decreased size of right ischiorectal fossa mass, may be patient's treated squamous cell carcinoma. Other stable findings above. Workstation performed: [id number]     Procedure: MRI brain bt w wo contrast  Result Date: 3/20/2025  Narrative: MRI BRAIN WITH AND WITHOUT  CONTRAST INDICATION: C79.31: Secondary malignant neoplasm of brain C34.91: Malignant neoplasm of unspecified part of right bronchus or lung. 75yo F with metastatic NSCLC, s/p prior SRS in 8/2024 for 5 lesions, now more recently s/p SRS to new L frontal met on 1/16/25. Surveillance COMPARISON: 12/27/2024 TECHNIQUE: Multiplanar, multisequence imaging of the brain and sella was performed before and after gadolinium administration. IV Contrast:  10 mL of Gadobutrol injection IMAGE QUALITY:   Diagnostic. FINDINGS: BRAIN TUMOR TREATMENT BED: Previously present large amount of vasogenic edema in the left frontal, parietal and occipital lobes is markedly diminished with a small amount of residual FLAIR envelope remaining. Overall mass effect on the right lateral ventricle is resolved. Scattered metastases/treatment related changes are noted on the following images of series 13: 1.  Questionable tiny 2 mm cortical left frontal vertex anterosuperiorly series 13 image 116 is stable versus resolved with artifact in this region. 2.  0.8 cm ring-enhancing lesion in the parasagittal aspect of the left parietal lobe along the medial margin of the diminishing vasogenic edema/FLAIR envelope on image 96 is smaller than on the prior study. 3.  2.5 cm crenulated abnormally ring-enhancing lesion in the posterior aspect of the left occipital lobe extending to the cortical surface is smaller than on the prior study, image 72 The other previously noted tiny metastases are no longer perceptible, possibly resolved. No new or additional metastases. Perfusion: No obviously elevated cerebral blood flow or perfusion. Diffusion: A small amount of mild diffusion restriction noted in the parasagittal aspect left parietal lobe series 302 image 23 correlating to the ring-enhancing abnormality in this region may reflect underlying cellularity. Similarly there is high signal in the left occipital lobe correlating to enhancing abnormality possibly  related to underlying cellularity. OTHER FINDINGS: Left frontal parietal occipital vasogenic edema/FLAIR envelope is significantly diminished with improved mass effect. There is still persistent vasogenic edema in this region. Moderate periventricular and to lesser degree subcortical FLAIR hyperintensities elsewhere are stable. VENTRICLES: Previously noted effacement of left lateral ventricle has resolved. SELLA AND PITUITARY GLAND:  Normal. ORBITS:  Normal. PARANASAL SINUSES:  Normal. VASCULATURE:  Evaluation of the major intracranial vasculature demonstrates appropriate flow voids. CALVARIUM AND SKULL BASE:  Normal. EXTRACRANIAL SOFT TISSUES:  Normal.     Impression: Status post treatment of 1.  Evidence of treatment response with diminishing vasogenic edema in the left cerebral hemisphere and diminishing size of scattered metastases. No new or progressive metastases. Continued clinical and imaging surveillance advised. 2.  Moderate, chronic microangiopathy redemonstrated. Workstation performed: [id number]     Procedure: CTA chest pe study  Result Date: 3/13/2025  Narrative: CTA - CHEST WITH IV CONTRAST - PULMONARY ANGIOGRAM INDICATION: C34.11: Malignant neoplasm of upper lobe, right bronchus or lung Z86.711: Personal history of pulmonary embolism. COMPARISON: CT chest 1/24/2025 TECHNIQUE: CTA examination of the chest was performed using angiographic technique according to a protocol specifically tailored to evaluate for pulmonary embolism. Multiplanar 2D reformatted images were created from the source data. In addition, coronal  3D MIP postprocessing was performed on the acquisition scanner. Radiation dose length product (DLP) for this visit: 128 mGy-cm . This examination, like all CT scans performed in the [facility], was performed utilizing techniques to minimize radiation dose exposure, including the use of iterative reconstruction and automated exposure control. IV Contrast: 85 mL of iohexol  FINDINGS: PULMONARY ARTERIAL TREE:  No pulmonary embolus. Again seen is eccentric small filling defect in the left lower lobe segmental bronchus (series 2 image 97), which can represent chronic pulmonary embolism. LUNGS: Mild centrilobular emphysema. Again seen is right upper lobe paramedian mass measuring 2.4 x 1.9 cm (series 5 image 28), previously 3.4 x 2.7 cm on 1/24/2025. Surrounding fibrotic changes have improved on this examination compared to prior, representing postradiation changes. Again seen are 2 pleural-based nodular opacities measuring 2.3 cm in the lingula and 2.5 cm in the left lower lobe (series 5 image 76). They've increased in size since 1/24/2025. A right lower lobe 0.9 x 0.8 cm pulmonary nodule (5/66), retrospectively seen on 1/23/2025 measuring 0.4 cm A new 2 mm subpleural left upper lobe pulmonary nodule (5/33) No tracheal or endobronchial lesion. PLEURA: Stable left posterior pleural-based calcification HEART/GREAT VESSELS: Heart is unremarkable for patient's age. No thoracic aortic aneurysm. MEDIASTINUM AND SUDEEP: Unremarkable. CHEST WALL AND LOWER NECK: Unremarkable. VISUALIZED STRUCTURES IN THE UPPER ABDOMEN: Partially visualized is right renal upper pole hypoenhancing mass lesion, incompletely characterized OSSEOUS STRUCTURES: No acute fracture or destructive osseous lesion.     Impression: No acute pulmonary embolism Mild interval decrease in the size of right upper lobe mass lesion and surrounding posttreatment changes compared to 1/24/2025. However, there is interval increase in the size of 2 left lung nodules and the right lower lobe pulmonary nodule, compared to 1/24/2025, concerning for worsening metastasis A new 2 mm subpleural left upper lobe nodule, which can be neoplastic or postinflammatory. Continued attention on follow-up CT chest in 3 months is recommended .Partially visualized right renal upper pole mass lesion, is incompletely characterized. Workstation performed:  [id number]     Procedure: XR chest 1 view portable  Result Date: 2/14/2025  Narrative: XR CHEST PORTABLE INDICATION: Cough. COMPARISON: 1 view chest 2/3/2025 FINDINGS: Patient rotated on the frontal view. Stable right suprahilar upper lobe opacity attributed to known mass and fibrotic scarring. No pneumothorax or pleural effusion. Normal cardiomediastinal silhouette. Bones are unremarkable for age. Normal upper abdomen.     Impression: No radiographic evidence of acute intrathoracic process or significant interval change allowing for difference in positioning. Workstation performed: [id number]     Procedure: XR chest 1 view portable  Result Date: 2/3/2025  Narrative: XR CHEST PORTABLE INDICATION: WEAKNESS. COMPARISON: 12/30/2024 and 12/1/2024 FINDINGS: Increasing density of right upper lobe opacity compared with prior studies, likely postobstructive consolidation with progression of volume loss and upward deviation of the fissure. No acute new infiltrates or dominant mass lesions seen elsewhere at this  time. No pneumothorax or pleural effusion. Stable cardiomediastinal silhouette Bones are unremarkable for age. Normal upper abdomen.     Impression: Increased density of pre-existing right upper lobe region opacity with suggestion of progressive volume loss in the right upper lobe with upward shifting of the fissure. Workstation performed: [id number]     Procedure: CT head without contrast  Result Date: 2/3/2025  Narrative: CT BRAIN - WITHOUT CONTRAST INDICATION:   Brain mets with worsening headache and nose bleeds. on xarelto. COMPARISON: CT head 11/24/2024 and brain MRI 12/27/2024 TECHNIQUE:  CT examination of the brain was performed.  Multiplanar 2D reformatted images were created from the source data. Radiation dose length product (DLP) for this visit:  984 mGy-cm .  This examination, like all CT scans performed in the [facility], was performed utilizing techniques to minimize radiation dose  exposure, including the use of iterative reconstruction and automated exposure control. IMAGE QUALITY:  Diagnostic. FINDINGS: PARENCHYMA:No intracranial mass, mass effect or midline shift. No CT signs of acute infarction.  No acute parenchymal hemorrhage. Left posterior frontal, parietal and occipital vasogenic edema. Left posterior frontal parafalcine vasogenic increase since November 2024 and likely unchanged since December 2024 allowing for difference in imaging modalities. Stable 5 mm midline shift to the right. Chronic microangiopathy. Calcification of bilateral internal carotid arteries. VENTRICLES AND EXTRA-AXIAL SPACES: Effaced left occipital horn unchanged. No acute hydrocephalus. VISUALIZED ORBITS: Normal visualized orbits. PARANASAL SINUSES: Normal visualized paranasal sinuses. CALVARIUM AND EXTRACRANIAL SOFT TISSUES: Unremarkable scalp. No skull fracture. Bilateral hyperostosis frontalis.     Impression: No acute intracranial process. Left posterior frontal, parietal and occipital vasogenic edema with 5 mm midline shift to the right without significant change since December 27, 2024 allowing for difference in imaging modalities and head positioning. Workstation performed: [id number]     Procedure: CTA chest pe study  Result Date: 1/24/2025  Narrative: CTA - CHEST WITH IV CONTRAST - PULMONARY ANGIOGRAM INDICATION: r/o PE. COMPARISON: CT chest abdomen pelvis 1/23/2025. Chest CT 1/14/2025. CTA chest abdomen pelvis 11/24/2024. TECHNIQUE: CTA examination of the chest was performed using angiographic technique according to a protocol specifically tailored to evaluate for pulmonary embolism. Multiplanar 2D reformatted images were created from the source data. In addition, coronal  3D MIP postprocessing was performed on the acquisition scanner. Radiation dose length product (DLP) for this visit: 176 mGy-cm . This examination, like all CT scans performed in the [facility], was performed utilizing  techniques to minimize radiation dose exposure, including the use of iterative reconstruction and automated exposure control. IV Contrast: 50 mL of iohexol (OMNIPAQUE) FINDINGS: PULMONARY ARTERIAL TREE: Tiny peripheral filling defect in a left lower lobe segmental artery on image 302/106. In retrospect this is unchanged since 1/14/2025, and represents sequela of previously seen embolus on 11/24/2024. No new filling defects to indicate acute embolus. LUNGS: 3.4 x 2.7 cm paramediastinal right upper lobe mass seen again. Surrounding fibrotic changes within the right upper lobe extending through the right lower lobe in a linear fashion likely due to radiation. Background mild emphysema. 1.6 cm lingular and 1.5 cm left lower lobe nodule seen again. PLEURA: Small bilateral pleural effusions. Calcified pleural plaque along the left lower lobe. HEART/GREAT VESSELS: Normal heart size. Atherosclerotic coronary artery and thoracic aortic calcification. No thoracic aortic aneurysm. MEDIASTINUM AND SUDEEP: Small hiatal hernia noted. No mediastinal lymphadenopathy. CHEST WALL AND LOWER NECK: Unremarkable. VISUALIZED STRUCTURES IN THE UPPER ABDOMEN: Moderate hiatal hernia. Partially visualized right renal mass better seen on CT chest abdomen pelvis from the day prior. OSSEOUS STRUCTURES: Mild T5 superior endplate compression fracture. Thoracic spondylosis.     Impression: 1. No evidence of acute pulmonary embolism. Stable tiny chronic embolus in a left lower lobe segmental artery. 2. Small bilateral pleural effusions. Otherwise no significant interval change in exam when compared to CT chest, abdomen, and pelvis examination from day prior. Resident: Reg Head I, the attending radiologist, have reviewed the images and agree with the final report above. Workstation performed: [id number]     Procedure: CT chest abdomen pelvis w contrast  Result Date: 1/24/2025  Narrative: CT CHEST, ABDOMEN AND PELVIS WITH IV CONTRAST INDICATION:  fever and bilateral lumbar/flank pain; sepsis. COMPARISON: CT 1/14/2025, 12/30/2024, 11/24/2024, 10/22/2024 TECHNIQUE: CT examination of the chest, abdomen and pelvis was performed. Multiplanar 2D reformatted images were created from the source data. This examination, like all CT scans performed in the [facility], was performed utilizing techniques to minimize radiation dose exposure, including the use of iterative reconstruction and automated exposure control. Radiation dose length product (DLP) for this visit: 315 mGy-cm IV Contrast: 90 mL of iohexol (OMNIPAQUE) Enteric Contrast: Not administered. FINDINGS: CHEST LUNGS: Right upper lobe mass measures 3.2 x 2.4 cm, shows progressive enlargement. There is currently more abutment with the adjacent mediastinum then when compared to the most recent prior. Parenchymal scarring in the right upper lobe and right lower lobe superior segments, likely radiation changes. 16 mm lingular nodule series 301/82, 8 mm on most recent prior 15 mm left lower lobe nodule series 301/82, 13 mm on most recent prior PLEURA: Unremarkable. HEART/GREAT VESSELS: Heart is unremarkable for patient's age. No thoracic aortic aneurysm. MEDIASTINUM AND SUDEEP: Unremarkable. CHEST WALL AND LOWER NECK: Unremarkable. ABDOMEN LIVER/BILIARY TREE: Unremarkable. GALLBLADDER: No calcified gallstones. No pericholecystic inflammatory change. SPLEEN: Unremarkable. PANCREAS: Unremarkable. ADRENAL GLANDS: Unremarkable. KIDNEYS/URETERS: Right upper pole ill-defined hypoenhancing mass measures 5.5 x 4.2 cm on series 301/108, this measured as large as 4.5 cm on the most recent prior CT abdomen based off of my measurement Interval increase of bilateral perinephric stranding, even when compared to the most recent chest CT. There is mild right urothelial thickening in the renal pelvis STOMACH AND BOWEL: Colonic diverticulosis without findings of acute diverticulitis. APPENDIX: No findings to suggest  appendicitis. ABDOMINOPELVIC CAVITY: 3.6 x 2.5 cm right ischiorectal fossa mass, series 301/227, decreased in size VESSELS: Unremarkable for patient's age. PELVIS REPRODUCTIVE ORGANS: Unremarkable for patient's age. URINARY BLADDER: Unremarkable. ABDOMINAL WALL/INGUINAL REGIONS: Unremarkable. BONES: Mild T5 superior endplate compression, in retrospect, there may have been a mild superior endplate compression on the most recent chest CT, which is now more pronounced, with surrounding sclerosis suggesting healing.     Impression: 1.  Findings consistent with disease progression, with rapid interval enlargement of left lung pulmonary nodules when compared to the CT 1/14/2025, increase in right upper lobe mass, and increase in right renal mass. There has been interval decrease in size of right ischiorectal fossa mass when compared to 11/24/2024. 2.  Bilateral perinephric stranding, with right urothelial thickening, concerning for urinary tract infection 3.  Mild T5 compression deformity, appears subacute The study was marked in EPIC for immediate notification. Workstation performed: [id number]     Procedure: CT chest w contrast  Result Date: 1/14/2025  Narrative: CT CHEST WITH IV CONTRAST INDICATION: C34.11: Malignant neoplasm of upper lobe, right bronchus or lung. New onset back pain COMPARISON: Abdominal CT dated 12/31/2024 TECHNIQUE: CT examination of the chest was performed. Multiplanar 2D reformatted images were created from the source data. This examination, like all CT scans performed in the [facility], was performed utilizing techniques to minimize radiation dose exposure, including the use of iterative reconstruction and automated exposure control. Radiation dose length product (DLP) for this visit: 157.4 mGy-cm IV Contrast: 85 mL of iohexol (OMNIPAQUE) FINDINGS: LUNGS: There are enlarging nodules in the left lower lobe and lingula including 8 mm lingula nodule on image 176 of series 3 which  measured 5 mm previously. A bilobed nodule versus 2 adjacent nodules in the left lower lobe measure 8 x 13 mm on image 177 compared with 4 x 8 mm previously. Right upper lobe mass measures 2.5 cm AP by 2.5 cm transverse on image 61 of series 3, unchanged. Adjacent bandlike parenchymal opacity with involvement of the right upper and lower lobes appears similar and may reflect  posttreatment change. There is a background of mild emphysema. PLEURA: Unremarkable. HEART/GREAT VESSELS: Heart is unremarkable for patient's age. No thoracic aortic aneurysm. MEDIASTINUM AND SUDEEP: Low-attenuation soft tissue along the mediastinum on the right which is contiguous with the esophagus may reflect volume averaging with the adjacent azygos vein. CHEST WALL AND LOWER NECK: Unremarkable. VISUALIZED STRUCTURES IN THE UPPER ABDOMEN: Ill-defined mass along the upper pole of the right kidney measures 3.8 cm transverse by 2.9 cm AP on image 104 of series 2, similar to prior abdominal CT. Hepatic steatosis is noted. OSSEOUS STRUCTURES: No acute fracture or destructive osseous lesion.     Impression: 1. No evidence of osseous metastatic disease of the thoracic spine. 2. Worsening nodules in the left lower lobe and lingula compatible with metastatic disease. 3. Similar appearance of the posttreatment change in the right lung. 4. Low-attenuation soft tissue density along the right mediastinum appears slightly more pronounced but at least partially reflects the azygos vein. Recommend attention to this area on follow-up. 5. Right upper renal mass is similar to recent abdominal CT. Primary or metastatic renal neoplasm suspected. Workstation performed: [id number]     Procedure: CT abdomen w contrast  Result Date: 1/1/2025  Narrative: CT ABDOMEN WITH IV CONTRAST INDICATION: known malignancy. COMPARISON: 11/24/2024 TECHNIQUE: CT examination of the abdomen was performed after the administration of intravenous contrast. Multiplanar 2D reformatted  images were created from the source data. This examination, like all CT scans performed in the [facility], was performed utilizing techniques to minimize radiation dose exposure, including the use of iterative reconstruction and automated exposure control. Radiation dose length product (DLP) for this visit: 214 mGy-cm IV Contrast: 85 mL of iohexol (OMNIPAQUE) Enteric Contrast: Not administered. FINDINGS: LOWER CHEST: Calcified left lower lobe pleural plaque. Cardiomegaly. LIVER/BILIARY TREE: Unremarkable. GALLBLADDER: No calcified gallstones. No pericholecystic inflammatory change. SPLEEN: Unremarkable. PANCREAS: Unremarkable. ADRENAL GLANDS: Unremarkable. KIDNEYS/VISUALIZED URETERS: Enlarging complex mass upper pole right kidney measuring 4.8 x 3.0 cm (previously 3.2 x 1.9 cm at a comparable level on exam from 11/24/2024). No hydronephrosis. Nonobstructing right nephrolithiasis. Scattered subcentimeter hypodensities arising from both kidneys, too small to characterize. STOMACH AND VISUALIZED BOWEL: Unremarkable. ABDOMINAL CAVITY: No ascites. No pneumoperitoneum. No lymphadenopathy. VESSELS: Atherosclerosis without abdominal aortic aneurysm. ABDOMINAL WALL: Unremarkable. BONES: No acute fracture or suspicious osseous lesion. Spinal degenerative changes.     Impression: 1.  Enlarging complex mass upper pole right kidney concerning for enlarging metastasis or primary renal neoplasia. 2.  No acute findings. See comments above for full analysis. Workstation performed: [id number]     Procedure: XR chest pa and lateral  Result Date: 12/31/2024  Narrative: XR CHEST PA AND LATERAL INDICATION: SOB. COMPARISON: Chest radiograph 12/1/2024 and concurrent CTA chest FINDINGS: Known right upper lobe mass is evident, though its full extent is better evident on prior CT. Increased right upper lobe atelectasis. Persistent opacities throughout the right upper lung. No pneumothorax or pleural effusion. Normal  cardiomediastinal silhouette. Age-related degenerative changes in the spine. Normal upper abdomen..    Impression: Right upper lobe mass which is better characterized on concurrent CTA chest. Right upper lung atelectasis has increased since the 12/1/2024 radiograph. Opacities in the right upper lung could represent posttreatment change an/or tumor. Infection is to be  determined clinical grounds. Resident: ASYA CHANCE I, the attending radiologist, have reviewed the images and agree with the final report above. 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 COMPARISON: CTA PE study 11/24/2024. TECHNIQUE: CTA examination of the chest was performed using angiographic technique according to a protocol specifically tailored to evaluate for pulmonary embolism. Multiplanar 2D reformatted images were created from the source data. In addition, coronal  3D MIP postprocessing was performed on the acquisition scanner. Radiation dose length product (DLP) for this visit: 191 mGy-cm . This examination, like all CT scans performed in the [facility], was performed utilizing techniques to minimize radiation dose exposure, including the use of iterative reconstruction and automated exposure control. IV Contrast: 85 mL of iohexol (OMNIPAQUE) FINDINGS: PULMONARY ARTERIAL TREE: Interval resolution of right middle lobe/right lower lobe pulmonary embolism. Interval near complete resolution of previously seen left lower lobe pulmonary embolism with questionable minimal residual linear thrombus in this region (301/109). LUNGS: Spiculated right upper lobe nodule measures 2.5 x 2.4 cm (3/62), slightly larger than on prior exam. Similar appearance of right upper lobe  groundglass opacities with interval increase in opacities in the superior right lower lobe. Cluster of micronodules in the medial right upper lobe (304/) may be infectious or inflammatory. Additional groundglass and reticular opacities in the left upper lobe. New lingular 6 mm nodule (304/173) and left lower lobe 7 mm nodule (304/170). Mild emphysema. PLEURA: Left calcified basilar pleural plaque (301/158). HEART/GREAT VESSELS: Heart is normal in size. Coronary artery calcifications. No thoracic aortic aneurysm. MEDIASTINUM AND SUDEEP: Unremarkable. CHEST WALL AND LOWER NECK: Unremarkable. VISUALIZED STRUCTURES IN THE UPPER ABDOMEN: Few calculi within the partially imaged right kidney measuring up to 3 mm without hydronephrosis in the visualized portion of the right kidney. Exophytic lesion arising from the upper pole of the right kidney measuring approximately 2.6 cm (602/112) in the region in which metastasis was suspected on prior CT. OSSEOUS STRUCTURES: Degenerative changes of the spine.     Impression: No new pulmonary embolism. Resolution of previously seen right-sided pulmonary embolism. Near complete resolution of previously seen left lower lobe pulmonary embolism with questionable minimal residual linear thrombus in this region. Spiculated right upper lobe nodule measures slightly larger than on prior exam. Increase in right lung groundglass opacities which may represent worsening pneumonia and/or malignancy. Incompletely evaluated right upper pole lesion appears larger than on prior exam, suspicious for metastasis. Dedicated abdominal imaging is recommended. Examination was marked in EPIC for immediate notification. 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 COMPARISON: MRI brain 10/21/2024. MRI brain brain tumor protocol 10/8/2024.  TECHNIQUE: Multiplanar, multisequence imaging of the brain and sella was performed before and after gadolinium administration. IV Contrast:  10 mL of Gadobutrol injection (SINGLE-DOSE) IMAGE QUALITY:   Motion-degraded, which reduces diagnostic sensitivity. FINDINGS: Multiple enhancing metastatic lesions as detailed below with comparison made to MRI brain tumor protocol 10/8/2024. 1. Stable tiny, subtle enhancing lesion in the left frontal lobe on series 13, image 121. 2. Increased size of a now 1.1 cm lesion in the left paramedian parietal lobe on series 13, image 105, previously 0.6 cm. 3. Decreased size of a now tiny lesion in the left paramedian frontal lobe on series 13, image 102 that previously measured 3 mm. 4. Increased size of a now 3.5 cm left occipital lesion (centrally hypoenhancing) on series 13, image 81 that previously measured 2.2 cm. 5. Left temporal lesion that was previously 4 mm is not as conspicuous/barely perceptible on the current study. 6. Decreased size of a now tiny lesion in the midline cerebellar vermis on series 13, image 47, previously 2 mm. There is a new lesion identified in the left frontal lobe on series 13, image 106 that measures 4 mm. There is slightly increased vasogenic edema in the left parietal and occipital lobes, when compared to the prior study, with mild mass effect on the left lateral ventricle. Slightly less conspicuous enhancement involving the bilateral cranial nerve VII and IACs. No acute infarct. Mild chronic ischemic changes of the white matter. PERFUSION: While most of the lesions above are too small to characterize by perfusion, there is no appreciable elevated perfusion associated with the left occipital and left parietal lobe lesions. VENTRICLES: As above. No hydrocephalus. SELLA AND PITUITARY GLAND:  Normal. ORBITS: No acute abnormality. PARANASAL SINUSES: Trace mucosal thickening. VASCULATURE:  Evaluation of the major intracranial vasculature demonstrates  "appropriate flow voids. CALVARIUM AND SKULL BASE: No acute abnormality. EXTRACRANIAL SOFT TISSUES: No acute abnormality.     Impression: 1. New enhancing lesion in the left frontal lobe, which is suspicious for metastasis. 2. Increased size of left occipital and left paramedian parietal lobe lesions without appreciable elevated perfusion. Findings favor posttreatment sequelae/radiation effects though recommend continued attention on follow-up. Of note, there is slightly increased associated vasogenic edema in the left cerebral hemisphere, as described above. 3. The other lesions are stable to decreased/less conspicuous compared to the prior study. 4. 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 Recommend continued clinical and imaging surveillance. The study was marked in EPIC for significant notification. Workstation performed: [id number]       30 minutes total time spent on 4/7/2025 in caring for this patient including obtaining/reviewing history, symptom assessment and management, medication review / adjustment, psychosocial support, reviewing / ordering tests, medicines, imaging, procedures, supportive listening, anticipatory guidance, patient/family education, instructions for management, importance of adhering to treatment plan, risks/benefits of treatment(s), risk factor reduction, and documenting in the medical record. All of the patient's questions were answered during this discussion.    ZULEYMA Pollard  [facility]  [phone]    Portions of this document may have been created using dictation software and as such some \"sound alike\" terms may have been generated by the system. Do not hesitate to contact me with any questions or clarifications.     "

## 2025-04-07 NOTE — DISCHARGE INSTRUCTIONS
You were seen in the Emergency Department for: Elevated temperature, muscle aches, headache, nausea    Your workup today showed: Reassuring physical exam, vital signs, chest x-ray, and lab tests    Your next steps should include: Please call today to make an appointment with your primary care provider in one week.    Reasons to RETURN IMMEDIATELY to the Emergency Department: worsening symptoms, difficulty breathing, temperature > 100.4 degrees, uncontrollable nausea/vomiting, or any other concerns.

## 2025-04-07 NOTE — TELEPHONE ENCOUNTER
Called and spoke to Ayla. She reported her fever started at 100.3 this morning and is now up to 100.5. Only symptoms other than fever are SOB with exertion and nausea, which she took zofran for. Dr. Castro recommends that she goes to the ED instead of coming to her office visit because she is afraid her fever will get worse. She has had multiple admissions after receiving treatments in the past for pneumonia/sepsis. I will place an ADT order so that the ER is expecting her. Ayla verbalized understanding.

## 2025-04-07 NOTE — TELEPHONE ENCOUNTER
"Received the following message from patient...    Good morning   I had my chemo infusion last ThurApril 3rd  .Today I am running a fever 100.4 I have an appt with Dr Castro today at 1:40 Should I wait till see Dr Castro or go to the ED    Called and spoke with patient.  Patient stated that she is experiencing left shoulder pain (which has been ongoing), nausea, and weakness.  Patient did take Zofran and did receive some relief.  Patient was able to eat some breakfast.  Patient stated that she feels \"wobbly\" when ambulating.  Patient took SpO2 and it was 88%, then checked later and it was 90%.  Patient does c/o SOB only on exertion.  Patient has a f/u with Dr. Castro this afternoon.  Patient inquiring if she should come to appointment or just go to ED.  Please call patient with further recommendations.           "

## 2025-04-07 NOTE — ASSESSMENT & PLAN NOTE
Ayla follows with palliative care for psychosocial support and symptom management of lung cancer with brain mets, perianal tumor   Symptoms - dyspnea on exertion, fatigue, pain, insomnia, nausea   ACP -  on file   RTC - 4 weeks    Lives alone.   Has two daughters .   Ex- is supportive and involved in care  Has brother that lives close by that helps as well.   Daughters are getting a life alert set up for patient.     No longer drives.

## 2025-04-08 DIAGNOSIS — C79.31 METASTATIC CANCER TO BRAIN (HCC): ICD-10-CM

## 2025-04-08 RX ORDER — PANTOPRAZOLE SODIUM 40 MG/1
40 TABLET, DELAYED RELEASE ORAL
Qty: 30 TABLET | Refills: 0 | Status: SHIPPED | OUTPATIENT
Start: 2025-04-08 | End: 2025-05-08

## 2025-04-12 LAB
BACTERIA BLD CULT: NORMAL
BACTERIA BLD CULT: NORMAL

## 2025-04-12 NOTE — PROGRESS NOTES
Name: Ayla Nye      : 1950      MRN: [id number]  Encounter Provider: Rosalinda Castro MD  Encounter Date: 2025   Encounter department: [facility] [place]  :  Assessment & Plan  Metastatic adenocarcinoma (HCC)    Orders:    CBC and differential; Future    XR chest pa and lateral; Future    Labs 6.3 Hgb 91 plts 409, diff pending   JAK2 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    Plts 409       Malignant neoplasm of upper lobe, right bronchus or lung (HCC)      C1 2024  C2  2024  C3 2024  C4 2024  C5 2024  C6 2024 at [place] f/b hospitalization for neutropenic fever     First line metastatic treatment     Carboplatin AUC 5 Pemetrexed 500 mg/m2 and Pembrolizumab 200 mg IV every 3 weeks x 4 cycles     C1 held due to hospital admission  C1 held due to radiation and concurrent high-dose steroid use     C1 now 10/7/2024  C2 10/28/2024-held due to admission     C2 2024- completed      No treatment since 2024 due to multiple admissions/toxicities, PCP pneumonia, PE on Xarelto     Second Line metastatic single agent Taoxtere 75 mg/m2    C1 2025    C2 2025-will decrease to 60 mg/m2 due to toxicities          Malignant neoplasm of upper lobe, right bronchus or lung (HCC)     Stage IV metastatic lung cancer, currently on carboplatin/paclitaxel and got radiation therapy from July to September, was on Keytruda which was stopped secondary to pneumonitis, on tapering dose of Decadron,  Noted to have worsening cerebral edema and memory loss 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 Patient with history of brain metastases from lung cancers, s/p SRS in August 2024 and in January 2025. Patient has been on steroids with decadron for many weeks now. Whenever steroids have been tapered to as low as 2 mg daily, patient would have episodes of worsening fatigue and confusion, similar to what was present during this admission.  CT brain showed stable findings with left posterior frontal, parietal, and occipital vasogenic edema with 5 mm midline shift to the right.           1/13/2025     Patient continues with Decadron, will titrate down to 6 mg starting tomorrow.  She is also scheduled to get SRT on 1/16/2025.        Her case was discussed in neuro-oncology board and recommendations were for Avastin due to concerns of radiation induced vasogenic edema as she is not responding to steroids.     Concern with bleeding risk/on Xarelto-will monitor closely     Complaining of worsening mid thoracic back pain radiating under ribs bilaterally-will get CT chest but last imaging 31 Dec without active PE, no bony mets in that area.      Patient has appointment on 1/27/2025, encouraged to keep appointment and will be seen in office postradiation therapy.        Plan is to start her on low-dose Avastin and resume carboplatin and pemetrexed.  Will continue to hold on pembrolizumab as patient continues to be on steroids.     Will dose Avastin at 5 mg/kg to start one week after SBRT     SBRT later this week; 16th Jan      Will monitor closely for bleeding, BP, MS changes     Continue to wean decadron ; concern with AI with rapid taper      Admitted again 2/3-2/4/2025 for balance issue after 1/23/2025 admission for fever/ chills with no source      2/11/2025     To date, had 3 cycles of Carbo/Pemetrexed but was  unable to get Avastin as had fever/chills post therapy     Will attempt again to treat 2/13/2025     Carboplatin dose AUC 4 Pemetrexed 300 mg/m2 rather than 500 mg/m2 and acceptable to give with GFR under 40 and will start Avastin and can be given with calculated clearance by Cockcroft and Gault      CrCl per Cockcroft and Gault is 44  GFR is 61per C/G      Both are acceptable to give these agent     Need to monitor urine protein which is more of an issue per the Avastin label the clearance      Monitor urine protein /BP/side effects      Doing better; currently on 3 mg bid decadron and will decrease to 2 mg bid next week-will not decrease further         3/3/2025     Patient is currently reduced dose carboplatin AUC 4, pemetrexed 300 mg/m² and Avastin 5 mg/kg that was added with last cycle she received on 2/13/2025.     Despite dose reduction she did not tolerated therapy very well and was admitted to hospital from 2/14-0/19 for high-grade fever, nausea and generalized weakness.  Infectious workup was negative.     Patient is planned for next treatment on 3/6/2025, however we will hold on upcoming treatment due to ongoing weakness and will reconsider chemotherapy options in about 2 weeks     Will repeat CBC and CMP to rule out cytopenia versus electrolyte abnormalities attributing to her symptoms.     Pending lab work and imaging, will likely consider switching chemotherapy to Taxotere versus further dose reduction in current regimen.     Again has headaches with steroid taper, will go back to 4 mg decadron in am and 2 mg at night     Labs repeated today with WBC 8.3 Hgb 9.3 thus stable and plts 595 diff pending  Will most likely stop this regimen as not tolerating; avastin made her weaker with increased bleeding issues  Increased SOB      Will consider single agent salvage Taxotere      3/17/2025     Has not had treatment since last admission in mid Feb     Too toxic for patient; every treatment has been  admitted     Has increased right gluteal pain-will reimage     Last scan months ago, will repeat CT CAP     Complains weakness/dizziness although better     Current deccadron at 2 mg bid and will not decrease; no headahces     Has been using Dilaudid for pain at 2 mg-will increase to 4 mg as states not helpful     Will start second line therapy with Taxotere single agent 75 mg/m2 every 3 weeks      Repeat labs/may need fluids      4/14/2025    S/p C1 taxotere 4/4/2025    Felt significant fatigue    Breathing worse although 93% on RA    Again seen in ER for fever 4/7/2025-low grade fever 99 but then increased to 100.5    No pneumonia, Chest Xray unremarkable     No source, not neutropenic 3 days after chemo     Will repeat labs today/CBC with diff as extreme fatigue    Decreased left lung base on exam-will do Chest Xray    Continues with pain in right hip s/p RT has gluteal mets        Repeat MRI 3/28/2025     Unremarkable right gluteal soft tissues adjacent to the vitamin E marker identifying the region of interest. No evidence of mass, signal abnormality or abnormal post gadolinium enhancement. No suspected metastatic disease throughout the bony pelvis.    Will decrease Taxotere 60 mg/m2 from 75 mg/m2 for C2 which will be 4/24/2025     Remain on decadron 2 mg bid-will try 3 mg daily but if symptomatic, restart at 4 total per day     MRI mid March with less edema     CT CAP 3/21/2025       Anteromedial right upper lobe: 5.0 x 2.5 cm, #70 (previously 2.6 x 1.9 cm). Evidence of central necrosis.  Right lower lobe: 1.2 x 1.0 cm, #153 (previously 0.9 x 0.8 cm)  Left lower lobe: 2.8 x 2.2 cm, #163 (previously 2.6 x 1.8 cm)  Left lower lobe: 0.4 cm, #164 (previously 0.3 cm)  Lingula: 2.9 x 2.1 cm, #163 (previously 2.3 x 1.8 cm)  Left upper lobe: 0.3 cm, #66, unchanged     Similar emphysema, mild left upper lobe reticular change, areas of linear scar and right paramedian posttreatment fibrosis with volume loss and traction  bronchiectasis.     PLEURA: Similar focal left posterior calcified plaque. Otherwise within normal limits.     HEART/GREAT VESSELS: Unchanged mild atherosclerosis and mitral annular calcification. Normal variant left vertebral artery arising from the aortic arch..     MEDIASTINUM AND SUDEEP:  Within normal limits.     CHEST WALL AND LOWER NECK:   Within normal limits.     ABDOMEN     LIVER/BILIARY TREE:  Within normal limits.     GALLBLADDER:  Within normal limits.     SPLEEN:  Within normal limits.     PANCREAS:  Within normal limits.     ADRENAL GLANDS:  Within normal limits.     KIDNEYS/URETERS:  7.1 x 5.6 x 5.5 cm (length X depth X width) ill-defined, hypoenhancing right upper pole mass (previously 6.4 x 4.5 x 5.2 cm). Small calcification.     Several, similar bilateral circumscribed hypodensities too small to definitively characterize, likely cysts.     Unchanged asymmetric right urothelial enhancement.     No hydronephrosis, perinephric fatty stranding or fluid.     STOMACH AND BOWEL:  Unchanged mild colonic diverticulosis.     Normal caliber and wall thickness.     APPENDIX: Not visualized.  No secondary signs of appendicitis.     ABDOMINOPELVIC CAVITY:  No abnormal air, fluid or enlarged lymph nodes.     VESSELS:  Unchanged mild atherosclerosis. Normal aortic caliber.     Right renal vein is patent.     PELVIS:     REPRODUCTIVE ORGANS:  Within normal limits for age.     URINARY BLADDER:  Within normal limits.     ABDOMINAL WALL: 2.9 x 2.1 cm right ischio rectal fossa mass (previously 3.0 x 2.8 cm. 301/227).     BONES: Persistent degenerative changes, scoliosis and mild T5 compression fracture.      IMPRESSION:     Chest:  Compared to 3/11/2024, increased size of pulmonary metastases.     Abdomen and pelvis:  Increased size of right renal mass compared to 1/23/2025. Metastatic disease versus primary malignancy.     Mildly decreased size of right ischiorectal fossa mass, may be patient's treated squamous cell  carcinoma.        Acute metabolic encephalopathy  Patient daughter reports she noticed confusion with tapering of the steroid with fatigue.  UA is negative for infection  CT of the head shows no acute intracranial process but left posterior frontal parietal and occipital vasogenic edema with 5 mm midline shift to the right without significant change, this was reviewed with radiation oncology and compared to prior Cts  Would start back on Decadron 4 mg twice daily and monitor mental status.  Currently alert oriented x 4 no acute neurofocal deficit noted.  If she has no improvement, would recommend going up to 4mg Q6H.   Would recommend slow taper again but would not taper below 2mg daily. She has been started on Avastin with the hope to bridge her off steroids as radiation oncology recommendation.     Essential thrombocytosis (HCC)      Plts 405, following; no treatment since getting chemotherapy       History of JAK2 positive thrombocytosis had required hydroxyurea in the past.  Her platelet counts have trended down likely in setting of chemotherapy.  Recent CBC now with thrombocytopenia  Hypothyroidism, unspecified type    Continue home dose of 50 mcg of levothyroxine            History of Pneumocystis jirovecii pneumonia  Continue Bactrim 1 tab 3 times a week        Epistaxis  Presents with recurrent epistaxis for the past week, complains of clot which is intermittent, while blowing the nose, no injury or trauma,  Recently started on Xarelto 20 mg daily for PE,-was held and restarted   Reached out to ENT, and cauterization of the bleeding vessel on the right nare stopped the bleeding.  ,Has not had issues and restarted Xarelto      Summary/Recommendations      Single agent Taxotere C1 was 4/4/2025    C2 4/24/2025 will dose reduce to 60 mg/m2    Chest Xray today    Labs today     Follow closely      Follow up 2 weeks            History of Present Illness   No chief complaint on file.    HPI:  Ayla Nye is a  74 y.o. female with medical hx remarkable of HTN, HLD, Nodular Goiter, Osteoprosis, Psoriasis, essential thrombocytosis, Pulmonary Embolism, lung adenocarcinoma as outlined below, and had superficial perianal mass biopsy showed squamous cell carcinoma s/p R gluteus radiation therapy completed 10/18/2024 .      history of [id number] (IIIB) lung adenocarcinoma of the right upper lobe, s/p concurrent chemoRT completing on 7/5/24. She completed a course of SRS on 8/8/24 after MRI brain demonstrated five supra- and infratentorial enhancing lesions compatible with metastases. She also completed a course of palliative radiation to right gluteus (for superficial squamous cell lesion) on 10/18/24.      On treatment with Carboplatin AUC 5 Pemetrexed 500 mg/m2 and Pembrolizumab 200 mg IV every 3 weeks x 4 cycles. Cycle 2 was completed on 11/14/24.  Unfortunately her therapy has been interrupted due to frequent hospitalizations.     History of [id number] (IIIB) lung adenocarcinoma of the right upper lobe, s/p concurrent chemoRT completing on 7/5/24. She completed a course of SRS on 8/8/24 after MRI brain demonstrated five supra- and infratentorial enhancing lesions compatible with metastases. She also completed a course of palliative radiation to right gluteus (for superficial squamous cell lesion) on 10/18/24.      Unfortunately her therapy has been interrupted due to frequent hospitalization, despite dose reduction.     Last chemotherapy on 2/13/2025 when she received carboplatin AUC 4 reduced from AUC 5, Pemetrexed 300 mg/m² which is reduced from 500 mg/m², bevacizumab 5 mg/kg, unfortunately developed fever, nausea and generalized weakness needing hospitalization again from 2/14 to 2/19 where she made SIRS criteria and was started on broad-spectrum antibiotics however infectious workup was negative.     3/17/2025     As above multiple issues with pain, weakness, fatigue.  Headaches less.  Currently on decadron 2 mg bid.  As  above, will start treatment with Taxotere single agent          Oncology History   Cancer Staging   Metastatic adenocarcinoma (HCC)  Staging form: Lung, AJCC 8th Edition  - Clinical stage from 4/15/2024: Stage IIIB (cT2b, cN3, cM0) - Signed by Rogelio Beebe DO on 4/15/2024  - Clinical: Stage IV (cM1) - Signed by Honey Gamboa MD on 9/23/2024  Oncology History   Metastatic adenocarcinoma (HCC)   2024 Initial Diagnosis    Primary lung adenocarcinoma, right (HCC)     3/26/2024 Biopsy    A-C. Lymph Node, Level 4R :    - Metastatic non-small cell carcinoma, most compatible with a primary lung adenocarcinoma; see note.       D-F. Lymph Node, Level 10R:    - Metastatic non-small cell carcinoma; see note.       G-I. Lymph Node, Level 4L:    - Metastatic non-small cell carcinoma; see note.       4/15/2024 -  Cancer Staged    Staging form: Lung, AJCC 8th Edition  - Clinical stage from 4/15/2024: Stage IIIB (cT2b, cN3, cM0) - Signed by Rogelio Beebe DO on 4/15/2024       5/23/2024 - 7/2/2024 Chemotherapy    alteplase (CATHFLO), 2 mg, Intracatheter, Every 1 Minute as needed, 6 of 6 cycles  CARBOplatin (PARAPLATIN) IVPB (GOG AUC DOSING), 130.4 mg, Intravenous, Once, 6 of 6 cycles  Administration: 130.4 mg (5/23/2024), 144.8 mg (5/30/2024), 138.4 mg (6/6/2024), 144.8 mg (6/13/2024), 132 mg (6/21/2024), 143.6 mg (7/2/2024)  PACLItaxel (TAXOL) chemo IVPB, 45 mg/m2 = 67.2 mg (90 % of original dose 50 mg/m2), Intravenous, Once, 6 of 6 cycles  Dose modification: 45 mg/m2 (original dose 50 mg/m2, Cycle 1, Reason: Anticipated Tolerance)  Administration: 67.2 mg (5/23/2024), 67.2 mg (5/30/2024), 67.2 mg (6/6/2024), 67.2 mg (6/13/2024), 67.2 mg (6/21/2024), 67.2 mg (7/2/2024)     5/23/2024 - 7/5/2024 Radiation    Treatment:  Course: C1    Plan ID Energy Fractions Dose per Fraction (cGy) Dose Correction (cGy) Total Dose Delivered (cGy) Elapsed Days   R Lung_Hilum 6X 30 / 30 200 0 6,000 43      Treatment dates:  C1:  5/23/2024 - 7/5/2024 8/8/2024 - 8/8/2024 Radiation    SRS 5 PTVs   2000 cGy     9/12/2024 Biopsy    Mass, Superficial perianal mass:  - Squamous cell carcinoma.     Note: The patient's prior lung EBUS sampling shows the tumor to stain for TTF1 and Napsin with absent p40 expression.  The current perianal mass sampling shows diffuse p40 expression with absent TTF1 expression.  This may represent a primary anal/perianal squamous cell carcinoma versus a possible metastatic combined lung tumor (adenocarcinoma and previously unsampled squamous component).  Suggest clinical correlation and appropriate follow-up.         9/23/2024 -  Cancer Staged    Staging form: Lung, AJCC 8th Edition  - Clinical: Stage IV (cM1) - Signed by Honey Gamboa MD on 9/23/2024       10/1/2024 - 10/18/2024 Radiation    Course: C3    Plan ID Energy Fractions Dose per Fraction (cGy) Dose Correction (cGy) Total Dose Delivered (cGy) Elapsed Days   R Gluteus:1 10X/6X 14 / 15 300 0 4,200 17      Treatment Dates:  10/1/2024 - 10/18/2024.       10/7/2024 - 2/13/2025 Chemotherapy    cyanocobalamin, 1,000 mcg, Intramuscular, Once, 2 of 3 cycles  Administration: 1,000 mcg (8/19/2024), 1,000 mcg (1/23/2025)  alteplase (CATHFLO), 2 mg, Intracatheter, Every 1 Minute as needed, 4 of 6 cycles  palonosetron (ALOXI), 0.25 mg, Intravenous, Once, 2 of 4 cycles  Administration: 0.25 mg (1/23/2025), 0.25 mg (2/13/2025)  fosaprepitant (EMEND) IVPB, 150 mg, Intravenous, Once, 4 of 6 cycles  Administration: 150 mg (10/7/2024), 150 mg (1/23/2025), 150 mg (2/13/2025), 150 mg (11/14/2024)  CARBOplatin (PARAPLATIN) IVPB (GOG AUC DOSING), 347 mg, Intravenous, Once, 4 of 6 cycles  Administration: 347 mg (10/7/2024), 256.8 mg (1/23/2025), 278.4 mg (2/13/2025), 346 mg (11/14/2024)  bevacizumab (AVASTIN) IVPB, 5 mg/kg = 280 mg (100 % of original dose 5 mg/kg), Intravenous, Once, 1 of 3 cycles  Dose modification: 5 mg/kg (original dose 5 mg/kg, Cycle 4, Reason: Anticipated  Tolerance)  Administration: 280 mg (2/13/2025)  pemetrexed (ALIMTA) chemo infusion, 735 mg, Intravenous, Once, 4 of 6 cycles  Dose modification: 300 mg/m2 (original dose 500 mg/m2, Cycle 4, Reason: Anticipated Tolerance)  Administration: 700 mg (10/7/2024), 700 mg (1/23/2025), 468 mg (2/13/2025), 700 mg (11/14/2024)  pembrolizumab (KEYTRUDA) IVPB, 200 mg, Intravenous, Once, 2 of 2 cycles  Administration: 200 mg (11/14/2024), 200 mg (10/7/2024)     1/16/2025 - 1/16/2025 Radiation    Course: C4  Plan ID Energy Fractions Dose per Fraction (cGy) Dose Correction (cGy) Total Dose Delivered (cGy) Elapsed Days   SRS L_Front 6X-FFF 1 / 1 2,000 0 2,000 0      Treatment dates:  C4 SRS: 1/16/2025 - 1/16/2025         4/3/2025 -  Chemotherapy    DOCEtaxel (TAXOTERE) chemo infusion, 75 mg/m2 = 114 mg, 1 of 6 cycles     Metastatic cancer to brain (HCC)   7/29/2024 Initial Diagnosis    Metastatic cancer to brain (HCC)     8/8/2024 - 8/8/2024 Radiation    SRS 5 PTVs   2000 cGy     9/12/2024 Biopsy    Mass, Superficial perianal mass:  - Squamous cell carcinoma.     Note: The patient's prior lung EBUS sampling shows the tumor to stain for TTF1 and Napsin with absent p40 expression.  The current perianal mass sampling shows diffuse p40 expression with absent TTF1 expression.  This may represent a primary anal/perianal squamous cell carcinoma versus a possible metastatic combined lung tumor (adenocarcinoma and previously unsampled squamous component).  Suggest clinical correlation and appropriate follow-up.         10/1/2024 - 10/18/2024 Radiation    Course: C3    Plan ID Energy Fractions Dose per Fraction (cGy) Dose Correction (cGy) Total Dose Delivered (cGy) Elapsed Days   R Gluteus:1 10X/6X 14 / 15 300 0 4,200 17      Treatment Dates:  10/1/2024 - 10/18/2024.       1/16/2025 - 1/16/2025 Radiation    Course: C4  Plan ID Energy Fractions Dose per Fraction (cGy) Dose Correction (cGy) Total Dose Delivered (cGy) Elapsed Days   SRS L_Front  6X-FFF 1 / 1 2,000 0 2,000 0      Treatment dates:  C4 SRS: 1/16/2025 - 1/16/2025            Pertinent Medical History      Review of Systems   Constitutional:  Positive for fatigue.   HENT: Negative.     Respiratory:  Positive for shortness of breath.    Cardiovascular: Negative.    Gastrointestinal: Negative.    Musculoskeletal:  Positive for back pain and joint swelling.   Hematological: Negative.    Psychiatric/Behavioral: Negative.             Objective   There were no vitals taken for this visit.    Pain Screening:     ECOG   2  Physical Exam  HENT:      Mouth/Throat:      Pharynx: Oropharynx is clear.   Eyes:      Conjunctiva/sclera: Conjunctivae normal.   Cardiovascular:      Rate and Rhythm: Normal rate.      Pulses: Normal pulses.   Pulmonary:      Comments: Decreased left base, no EEW, no rhonchi   Abdominal:      General: Bowel sounds are normal.   Musculoskeletal:         General: Normal range of motion.      Cervical back: Normal range of motion.   Skin:     General: Skin is warm.   Neurological:      Mental Status: Mental status is at baseline.   Psychiatric:         Mood and Affect: Mood normal.       Labs: I have reviewed the following labs:  Lab Results   Component Value Date/Time    WBC 8.81 04/07/2025 01:40 PM    RBC 3.12 (L) 04/07/2025 01:40 PM    Hemoglobin 9.1 (L) 04/07/2025 01:40 PM    Hematocrit 29.3 (L) 04/07/2025 01:40 PM    MCV 94 04/07/2025 01:40 PM    MCH 29.2 04/07/2025 01:40 PM    RDW 16.3 (H) 04/07/2025 01:40 PM    Platelets 275 04/07/2025 01:40 PM    Segmented % 71 02/19/2025 04:18 AM    Lymphocytes % 3 (L) 04/07/2025 01:40 PM    Lymphocytes % 21 02/19/2025 04:18 AM    Monocytes % 0 (L) 04/07/2025 01:40 PM    Monocytes % 5 02/19/2025 04:18 AM    Eosinophils % 0 04/07/2025 01:40 PM    Eosinophils Relative 1 02/19/2025 04:18 AM    Basophils % 0 04/07/2025 01:40 PM    Basophils Relative 1 02/19/2025 04:18 AM    Immature Grans % 1 02/19/2025 04:18 AM    Absolute Neutrophils 1.50 (L)  02/19/2025 04:18 AM     Lab Results   Component Value Date/Time    Potassium 3.9 04/07/2025 01:40 PM    Chloride 103 04/07/2025 01:40 PM    CO2 27 04/07/2025 01:40 PM    BUN 19 04/07/2025 01:40 PM    Creatinine 0.79 04/07/2025 01:40 PM    Glucose, Fasting 97 03/25/2025 07:47 AM    Calcium 8.8 04/07/2025 01:40 PM    Corrected Calcium 9.3 04/07/2025 01:40 PM    AST 10 (L) 04/07/2025 01:40 PM    ALT 6 (L) 04/07/2025 01:40 PM    Alkaline Phosphatase 44 04/07/2025 01:40 PM    Total Protein 6.4 04/07/2025 01:40 PM    Albumin 3.4 (L) 04/07/2025 01:40 PM    Total Bilirubin 0.27 04/07/2025 01:40 PM    eGFR 73 04/07/2025 01:40 PM           Administrative Statements   I have spent a total time of 40 minutes in caring for this patient on the day of the visit/encounter including Diagnostic results, Impressions, Counseling / Coordination of care, Documenting in the medical record, Reviewing/placing orders in the medical record (including tests, medications, and/or procedures), and Obtaining or reviewing history  .

## 2025-04-14 ENCOUNTER — APPOINTMENT (OUTPATIENT)
Dept: LAB | Facility: CLINIC | Age: 75
End: 2025-04-14
Attending: INTERNAL MEDICINE
Payer: MEDICARE

## 2025-04-14 ENCOUNTER — HOSPITAL ENCOUNTER (OUTPATIENT)
Dept: RADIOLOGY | Facility: HOSPITAL | Age: 75
Discharge: HOME | End: 2025-04-14
Payer: MEDICARE

## 2025-04-14 ENCOUNTER — OFFICE VISIT (OUTPATIENT)
Dept: HEMATOLOGY ONCOLOGY | Facility: CLINIC | Age: 75
End: 2025-04-14
Payer: MEDICARE

## 2025-04-14 VITALS
OXYGEN SATURATION: 93 % | DIASTOLIC BLOOD PRESSURE: 78 MMHG | TEMPERATURE: 97.8 F | WEIGHT: 120 LBS | SYSTOLIC BLOOD PRESSURE: 134 MMHG | RESPIRATION RATE: 19 BRPM | HEIGHT: 62 IN | HEART RATE: 97 BPM | BODY MASS INDEX: 22.08 KG/M2

## 2025-04-14 DIAGNOSIS — C79.9 METASTATIC ADENOCARCINOMA (HCC): ICD-10-CM

## 2025-04-14 DIAGNOSIS — Z15.89 JAK2 V617F MUTATION: ICD-10-CM

## 2025-04-14 DIAGNOSIS — C79.9 METASTATIC ADENOCARCINOMA (HCC): Primary | ICD-10-CM

## 2025-04-14 DIAGNOSIS — C34.11 MALIGNANT NEOPLASM OF UPPER LOBE, RIGHT BRONCHUS OR LUNG (HCC): ICD-10-CM

## 2025-04-14 LAB
ANISOCYTOSIS BLD QL SMEAR: PRESENT
BASOPHILS # BLD MANUAL: 0 THOUSAND/UL (ref 0–0.1)
BASOPHILS NFR MAR MANUAL: 0 % (ref 0–1)
EOSINOPHIL # BLD MANUAL: 0 THOUSAND/UL (ref 0–0.4)
EOSINOPHIL NFR BLD MANUAL: 0 % (ref 0–6)
ERYTHROCYTE [DISTWIDTH] IN BLOOD BY AUTOMATED COUNT: 16.7 % (ref 11.6–15.1)
HCT VFR BLD AUTO: 30.7 % (ref 34.8–46.1)
HGB BLD-MCNC: 9.1 G/DL (ref 11.5–15.4)
LG PLATELETS BLD QL SMEAR: PRESENT
LYMPHOCYTES # BLD AUTO: 0.75 THOUSAND/UL (ref 0.6–4.47)
LYMPHOCYTES # BLD AUTO: 12 % (ref 14–44)
MCH RBC QN AUTO: 28 PG (ref 26.8–34.3)
MCHC RBC AUTO-ENTMCNC: 29.6 G/DL (ref 31.4–37.4)
MCV RBC AUTO: 95 FL (ref 82–98)
MONOCYTES # BLD AUTO: 0.44 THOUSAND/UL (ref 0–1.22)
MONOCYTES NFR BLD: 7 % (ref 4–12)
NEUTROPHILS # BLD MANUAL: 5.06 THOUSAND/UL (ref 1.85–7.62)
NEUTS BAND NFR BLD MANUAL: 5 % (ref 0–8)
NEUTS SEG NFR BLD AUTO: 76 % (ref 43–75)
PLATELET # BLD AUTO: 409 THOUSANDS/UL (ref 149–390)
PLATELET BLD QL SMEAR: ABNORMAL
PMV BLD AUTO: 9.3 FL (ref 8.9–12.7)
RBC # BLD AUTO: 3.25 MILLION/UL (ref 3.81–5.12)
RBC MORPH BLD: PRESENT
WBC # BLD AUTO: 6.25 THOUSAND/UL (ref 4.31–10.16)

## 2025-04-14 PROCEDURE — 85027 COMPLETE CBC AUTOMATED: CPT

## 2025-04-14 PROCEDURE — 71046 X-RAY EXAM CHEST 2 VIEWS: CPT

## 2025-04-14 PROCEDURE — 36415 COLL VENOUS BLD VENIPUNCTURE: CPT

## 2025-04-14 PROCEDURE — 99215 OFFICE O/P EST HI 40 MIN: CPT | Performed by: INTERNAL MEDICINE

## 2025-04-14 PROCEDURE — 85007 BL SMEAR W/DIFF WBC COUNT: CPT

## 2025-04-14 RX ORDER — SODIUM CHLORIDE 9 MG/ML
20 INJECTION, SOLUTION INTRAVENOUS ONCE
OUTPATIENT
Start: 2025-04-24

## 2025-04-14 RX ORDER — PALONOSETRON 0.05 MG/ML
0.25 INJECTION, SOLUTION INTRAVENOUS ONCE
OUTPATIENT
Start: 2025-04-24

## 2025-04-14 NOTE — ASSESSMENT & PLAN NOTE
Orders:    CBC and differential; Future    XR chest pa and lateral; Future    Labs 6.3 Hgb 91 plts 409, diff pending

## 2025-04-15 ENCOUNTER — TELEPHONE (OUTPATIENT)
Age: 75
End: 2025-04-15

## 2025-04-15 NOTE — TELEPHONE ENCOUNTER
Title: Med Time Out    Date patient scheduled: 4/24/25    Original medication ordered: Taxotere 75 mg/2    New Medication ordered: Taxotere 60 mg/m2    Office RN notified patient?? Notified at visit on 4/14

## 2025-04-15 NOTE — CONSULTS
Vancomycin IV Pharmacy-to-Dose Consultation    Ayla Nye is a 74 y.o. female who was receiving Vancomycin IV with management by the Pharmacy Consult service.       The patient's Vancomycin therapy has been discontinued. Thank you for allowing us to take part in this patient's care. Pharmacy will sign-off now; please call or re-consult if there are any questions.        Rayna Brown, PharmD  Pharmacist    
risk factors

## 2025-04-21 ENCOUNTER — APPOINTMENT (EMERGENCY)
Dept: CT IMAGING | Facility: HOSPITAL | Age: 75
DRG: 064 | End: 2025-04-21
Payer: MEDICARE

## 2025-04-21 ENCOUNTER — NURSE TRIAGE (OUTPATIENT)
Age: 75
End: 2025-04-21

## 2025-04-21 ENCOUNTER — HOSPITAL ENCOUNTER (INPATIENT)
Facility: HOSPITAL | Age: 75
LOS: 4 days | Discharge: HOME HEALTH | DRG: 064 | End: 2025-04-25
Attending: EMERGENCY MEDICINE
Payer: MEDICARE

## 2025-04-21 ENCOUNTER — APPOINTMENT (INPATIENT)
Dept: MRI IMAGING | Facility: HOSPITAL | Age: 75
DRG: 064 | End: 2025-04-21
Payer: MEDICARE

## 2025-04-21 DIAGNOSIS — K21.9 GASTROESOPHAGEAL REFLUX DISEASE WITHOUT ESOPHAGITIS: ICD-10-CM

## 2025-04-21 DIAGNOSIS — Z51.5 PALLIATIVE CARE PATIENT: ICD-10-CM

## 2025-04-21 DIAGNOSIS — C79.9 METASTATIC CANCER (HCC): ICD-10-CM

## 2025-04-21 DIAGNOSIS — C79.31 LUNG CANCER METASTATIC TO BRAIN (HCC): Primary | ICD-10-CM

## 2025-04-21 DIAGNOSIS — C34.90 LUNG CANCER METASTATIC TO BRAIN (HCC): Primary | ICD-10-CM

## 2025-04-21 DIAGNOSIS — R53.1 LEFT-SIDED WEAKNESS: Primary | ICD-10-CM

## 2025-04-21 DIAGNOSIS — C79.31 METASTATIC CANCER TO BRAIN (HCC): ICD-10-CM

## 2025-04-21 DIAGNOSIS — R29.898 WEAKNESS OF LEFT UPPER EXTREMITY: ICD-10-CM

## 2025-04-21 LAB
ANION GAP SERPL CALCULATED.3IONS-SCNC: 9 MMOL/L (ref 4–13)
ANISOCYTOSIS BLD QL SMEAR: PRESENT
BASOPHILS # BLD MANUAL: 0 THOUSAND/UL (ref 0–0.1)
BASOPHILS NFR MAR MANUAL: 0 % (ref 0–1)
BUN SERPL-MCNC: 23 MG/DL (ref 5–25)
CALCIUM SERPL-MCNC: 8.9 MG/DL (ref 8.4–10.2)
CHLORIDE SERPL-SCNC: 109 MMOL/L (ref 96–108)
CO2 SERPL-SCNC: 22 MMOL/L (ref 21–32)
CREAT SERPL-MCNC: 0.84 MG/DL (ref 0.6–1.3)
EOSINOPHIL # BLD MANUAL: 0 THOUSAND/UL (ref 0–0.4)
EOSINOPHIL NFR BLD MANUAL: 0 % (ref 0–6)
ERYTHROCYTE [DISTWIDTH] IN BLOOD BY AUTOMATED COUNT: 17.3 % (ref 11.6–15.1)
GFR SERPL CREATININE-BSD FRML MDRD: 68 ML/MIN/1.73SQ M
GLUCOSE SERPL-MCNC: 90 MG/DL (ref 65–140)
HCT VFR BLD AUTO: 29.7 % (ref 34.8–46.1)
HGB BLD-MCNC: 8.7 G/DL (ref 11.5–15.4)
INR PPP: 1.29 (ref 0.85–1.19)
LYMPHOCYTES # BLD AUTO: 0.57 THOUSAND/UL (ref 0.6–4.47)
LYMPHOCYTES # BLD AUTO: 4 % (ref 14–44)
MACROCYTES BLD QL AUTO: PRESENT
MCH RBC QN AUTO: 27.8 PG (ref 26.8–34.3)
MCHC RBC AUTO-ENTMCNC: 29.3 G/DL (ref 31.4–37.4)
MCV RBC AUTO: 95 FL (ref 82–98)
METAMYELOCYTE ABSOLUTE CT: 0.23 THOUSAND/UL (ref 0–0.1)
METAMYELOCYTES NFR BLD MANUAL: 2 % (ref 0–1)
MICROCYTES BLD QL AUTO: PRESENT
MONOCYTES # BLD AUTO: 0.46 THOUSAND/UL (ref 0–1.22)
MONOCYTES NFR BLD: 4 % (ref 4–12)
NEUTROPHILS # BLD MANUAL: 10.03 THOUSAND/UL (ref 1.85–7.62)
NEUTS SEG NFR BLD AUTO: 88 % (ref 43–75)
PLATELET # BLD AUTO: 381 THOUSANDS/UL (ref 149–390)
PLATELET BLD QL SMEAR: ABNORMAL
PMV BLD AUTO: 9.2 FL (ref 8.9–12.7)
POIKILOCYTOSIS BLD QL SMEAR: PRESENT
POLYCHROMASIA BLD QL SMEAR: PRESENT
POTASSIUM SERPL-SCNC: 4.1 MMOL/L (ref 3.5–5.3)
PROMYELOCYTE ABSOLUTE CT: 0.11 THOUSAND/UL (ref 0–0)
PROMYELOCYTES NFR BLD MANUAL: 1 % (ref 0–0)
PROTHROMBIN TIME: 16.9 SECONDS (ref 12.3–15)
RBC # BLD AUTO: 3.13 MILLION/UL (ref 3.81–5.12)
RBC MORPH BLD: PRESENT
SODIUM SERPL-SCNC: 140 MMOL/L (ref 135–147)
VARIANT LYMPHS # BLD AUTO: 1 %
WBC # BLD AUTO: 11.4 THOUSAND/UL (ref 4.31–10.16)

## 2025-04-21 PROCEDURE — 36415 COLL VENOUS BLD VENIPUNCTURE: CPT

## 2025-04-21 PROCEDURE — 80048 BASIC METABOLIC PNL TOTAL CA: CPT

## 2025-04-21 PROCEDURE — 70496 CT ANGIOGRAPHY HEAD: CPT

## 2025-04-21 PROCEDURE — 85610 PROTHROMBIN TIME: CPT

## 2025-04-21 PROCEDURE — 85027 COMPLETE CBC AUTOMATED: CPT

## 2025-04-21 PROCEDURE — 99223 1ST HOSP IP/OBS HIGH 75: CPT

## 2025-04-21 PROCEDURE — 70498 CT ANGIOGRAPHY NECK: CPT

## 2025-04-21 PROCEDURE — 99285 EMERGENCY DEPT VISIT HI MDM: CPT

## 2025-04-21 PROCEDURE — 70553 MRI BRAIN STEM W/O & W/DYE: CPT

## 2025-04-21 PROCEDURE — 85007 BL SMEAR W/DIFF WBC COUNT: CPT

## 2025-04-21 PROCEDURE — A9585 GADOBUTROL INJECTION: HCPCS | Performed by: INTERNAL MEDICINE

## 2025-04-21 PROCEDURE — 99285 EMERGENCY DEPT VISIT HI MDM: CPT | Performed by: EMERGENCY MEDICINE

## 2025-04-21 RX ORDER — LABETALOL HYDROCHLORIDE 5 MG/ML
10 INJECTION, SOLUTION INTRAVENOUS EVERY 6 HOURS PRN
Status: DISCONTINUED | OUTPATIENT
Start: 2025-04-21 | End: 2025-04-25 | Stop reason: HOSPADM

## 2025-04-21 RX ORDER — SULFAMETHOXAZOLE AND TRIMETHOPRIM 800; 160 MG/1; MG/1
1 TABLET ORAL
Status: DISCONTINUED | OUTPATIENT
Start: 2025-04-23 | End: 2025-04-25 | Stop reason: HOSPADM

## 2025-04-21 RX ORDER — LEVETIRACETAM 500 MG/1
1000 TABLET ORAL 2 TIMES DAILY
Status: DISCONTINUED | OUTPATIENT
Start: 2025-04-21 | End: 2025-04-25 | Stop reason: HOSPADM

## 2025-04-21 RX ORDER — ACETAMINOPHEN 325 MG/1
975 TABLET ORAL EVERY 8 HOURS SCHEDULED
Status: DISCONTINUED | OUTPATIENT
Start: 2025-04-22 | End: 2025-04-25 | Stop reason: HOSPADM

## 2025-04-21 RX ORDER — HYDROMORPHONE HYDROCHLORIDE 2 MG/1
2 TABLET ORAL EVERY 4 HOURS PRN
Refills: 0 | Status: DISCONTINUED | OUTPATIENT
Start: 2025-04-21 | End: 2025-04-25 | Stop reason: HOSPADM

## 2025-04-21 RX ORDER — ACETAMINOPHEN 325 MG/1
650 TABLET ORAL EVERY 6 HOURS SCHEDULED
Status: DISCONTINUED | OUTPATIENT
Start: 2025-04-21 | End: 2025-04-21

## 2025-04-21 RX ORDER — ACETAMINOPHEN 325 MG/1
325 TABLET ORAL ONCE
Status: COMPLETED | OUTPATIENT
Start: 2025-04-21 | End: 2025-04-21

## 2025-04-21 RX ORDER — GABAPENTIN 300 MG/1
300 CAPSULE ORAL
Status: DISCONTINUED | OUTPATIENT
Start: 2025-04-21 | End: 2025-04-25 | Stop reason: HOSPADM

## 2025-04-21 RX ORDER — ONDANSETRON 4 MG/1
4 TABLET, ORALLY DISINTEGRATING ORAL EVERY 6 HOURS PRN
Status: DISCONTINUED | OUTPATIENT
Start: 2025-04-21 | End: 2025-04-25 | Stop reason: HOSPADM

## 2025-04-21 RX ORDER — AMLODIPINE BESYLATE 5 MG/1
5 TABLET ORAL DAILY
Status: DISCONTINUED | OUTPATIENT
Start: 2025-04-22 | End: 2025-04-25 | Stop reason: HOSPADM

## 2025-04-21 RX ORDER — GABAPENTIN 100 MG/1
100 CAPSULE ORAL
Status: DISCONTINUED | OUTPATIENT
Start: 2025-04-21 | End: 2025-04-25 | Stop reason: HOSPADM

## 2025-04-21 RX ORDER — DEXAMETHASONE 2 MG/1
2 TABLET ORAL 2 TIMES DAILY WITH MEALS
Status: DISCONTINUED | OUTPATIENT
Start: 2025-04-21 | End: 2025-04-25 | Stop reason: HOSPADM

## 2025-04-21 RX ORDER — LEVOTHYROXINE SODIUM 50 UG/1
50 TABLET ORAL
Status: DISCONTINUED | OUTPATIENT
Start: 2025-04-22 | End: 2025-04-25 | Stop reason: HOSPADM

## 2025-04-21 RX ORDER — PANTOPRAZOLE SODIUM 40 MG/1
40 TABLET, DELAYED RELEASE ORAL
Status: DISCONTINUED | OUTPATIENT
Start: 2025-04-22 | End: 2025-04-25 | Stop reason: HOSPADM

## 2025-04-21 RX ORDER — SENNOSIDES 8.6 MG
1 TABLET ORAL DAILY
Status: DISCONTINUED | OUTPATIENT
Start: 2025-04-21 | End: 2025-04-25

## 2025-04-21 RX ORDER — GADOBUTROL 604.72 MG/ML
5 INJECTION INTRAVENOUS
Status: COMPLETED | OUTPATIENT
Start: 2025-04-21 | End: 2025-04-21

## 2025-04-21 RX ADMIN — GADOBUTROL 5 ML: 604.72 INJECTION INTRAVENOUS at 17:47

## 2025-04-21 RX ADMIN — IOHEXOL 70 ML: 350 INJECTION, SOLUTION INTRAVENOUS at 11:48

## 2025-04-21 RX ADMIN — GABAPENTIN 300 MG: 300 CAPSULE ORAL at 21:17

## 2025-04-21 RX ADMIN — GABAPENTIN 100 MG: 100 CAPSULE ORAL at 16:10

## 2025-04-21 RX ADMIN — LEVETIRACETAM 1000 MG: 500 TABLET, FILM COATED ORAL at 21:17

## 2025-04-21 RX ADMIN — ACETAMINOPHEN 325 MG: 325 TABLET, FILM COATED ORAL at 18:30

## 2025-04-21 RX ADMIN — ACETAMINOPHEN 650 MG: 325 TABLET, FILM COATED ORAL at 15:46

## 2025-04-21 RX ADMIN — HYDROMORPHONE HYDROCHLORIDE 2 MG: 2 TABLET ORAL at 21:16

## 2025-04-21 RX ADMIN — DEXAMETHASONE 2 MG: 2 TABLET ORAL at 17:57

## 2025-04-21 RX ADMIN — LABETALOL HYDROCHLORIDE 10 MG: 5 INJECTION, SOLUTION INTRAVENOUS at 18:24

## 2025-04-21 NOTE — ASSESSMENT & PLAN NOTE
History of anemia  And history of vitamin B12 deficiency and essential thrombocytosis  Hemoglobin baseline 8.7-9 .5  Continue to monitor

## 2025-04-21 NOTE — TELEPHONE ENCOUNTER
"Phone outreach to Juliana in response to [employer] message. I spoke with Ayla, she reports increased left sided weakness, unable to pick things up and hold anything with left hand.  She reports left leg weakness and reports is off balance, she report onset within the past couple of days. She admits to a little confusion, denies slurred speech or any pain.      I advised she be evaluated in the ED, Juliana stated \" I will take her now\".      FOLLOW UP: Patient will go to ED for evaluation    REASON FOR CONVERSATION:  weakness    SYMPTOMS: left sided weakness, off balance    OTHER: .    DISPOSITION: to Emergency Department    Reason for Disposition   Neurologic deficit that was brief (now gone), ANY of the following: * Weakness of the face, arm, or leg on one side of the body * Numbness of the face, arm, or leg on one side of the body * Loss of speech or garbled speech    Additional Information   Weakness of the face, arm or leg on one side of the body    Answer Assessment - Initial Assessment Questions  1. DESCRIPTION: \"Describe how you are feeling.\"      Left sided weakness, unable to  anything with left hand. Reports left leg weakness, difficulty standing and is off balance. Onset a few days ago    2. SEVERITY: \"How bad is it?\"  \"Can you stand and walk?\"      With help    3. ONSET: \"When did these symptoms begin?\" (e.g., hours, days, weeks, months)      A few days ago    4. CAUSE: \"What do you think is causing the weakness or fatigue?\" (e.g., not drinking enough fluids, medical problem, trouble sleeping)      Unsure    5. NEW MEDICINES:  \"Have you started on any new medicines recently?\" (e.g., opioid pain medicines, benzodiazepines, muscle relaxants, antidepressants, antihistamines, neuroleptics, beta blockers)      No    6. OTHER SYMPTOMS: \"Do you have any other symptoms?\" (e.g., chest pain, fever, cough, SOB, vomiting, diarrhea, bleeding, other areas of pain)      Off balance    Protocols used: Weakness " (Generalized) and Fatigue-Adult-[place], Neurologic Deficit-Adult-[place]

## 2025-04-21 NOTE — ASSESSMENT & PLAN NOTE
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  Denies other neurological abnormalities  On Decadron 2 mg twice daily at home  CTA head and neck showed vasogenic edema.  Involving the left parietal and left occipital lobe similar compared to the recent brain MRI.  Small 5 mm hyperdense focus noted within the left parietal occipital lobe for 9 minor percent of small focus of hemorrhage within the dominant lesion.  Redemonstrated Central necrotic mass involving bilateral external anterior right upper lobe with scarring and fibrosis.   Physical examination-1 out of 5 strength in left upper extremity and 3 out of 5 strength in left lower extremity, no sensation abnormalities .  Plan  MRI brain with and without contrast  Neurology has been consulted, recommended getting the MRI and continuing home dose of Decadron  Frequent neurochecks  Continue home dose of Decadron 2 mg twice daily  Will hold Xarelto as there is concern for bleed  Continue keppra  Per neurology , blood pressure goal is normotensive   Frequent neuro checks

## 2025-04-21 NOTE — PLAN OF CARE
Problem: Potential for Falls  Goal: Patient will remain free of falls  Description: INTERVENTIONS:- Educate patient/family on patient safety including physical limitations- Instruct patient to call for assistance with activity - Consult OT/PT to assist with strengthening/mobility - Keep Call bell within reach- Keep bed low and locked with side rails adjusted as appropriate- Keep care items and personal belongings within reach- Initiate and maintain comfort rounds- Make Fall Risk Sign visible to staff- Offer Toileting every  Hours, in advance of need- Initiate/Maintain alarm- Obtain necessary fall risk management equipment: - Apply yellow socks and bracelet for high fall risk patients- Consider moving patient to room near nurses station  INTERVENTIONS:- Educate patient/family on patient safety including physical limitations- Instruct patient to call for assistance with activity - Consult OT/PT to assist with strengthening/mobility - Keep Call bell within reach- Keep bed low and locked with side rails adjusted as appropriate- Keep care items and personal belongings within reach- Initiate and maintain comfort rounds- Make Fall Risk Sign visible to staff- Offer Toileting every  Hours, in advance of need- Initiate/Maintain alarm- Obtain necessary fall risk management equipment: - Apply yellow socks and bracelet for high fall risk patients- Consider moving patient to room near nurses station  Outcome: Progressing     Problem: PAIN - ADULT  Goal: Verbalizes/displays adequate comfort level or baseline comfort level  Description: Interventions:- Encourage patient to monitor pain and request assistance- Assess pain using appropriate pain scale- Administer analgesics based on type and severity of pain and evaluate response- Implement non-pharmacological measures as appropriate and evaluate response- Consider cultural and social influences on pain and pain management- Notify physician/advanced practitioner if interventions  unsuccessful or patient reports new pain  Outcome: Progressing     Problem: INFECTION - ADULT  Goal: Absence or prevention of progression during hospitalization  Description: INTERVENTIONS:- Assess and monitor for signs and symptoms of infection- Monitor lab/diagnostic results- Monitor all insertion sites, i.e. indwelling lines, tubes, and drains- Monitor endotracheal if appropriate and nasal secretions for changes in amount and color- Bradley appropriate cooling/warming therapies per order- Administer medications as ordered- Instruct and encourage patient and family to use good hand hygiene technique- Identify and instruct in appropriate isolation precautions for identified infection/condition  Outcome: Progressing  Goal: Absence of fever/infection during neutropenic period  Description: INTERVENTIONS:- Monitor WBC  Outcome: Progressing     Problem: SAFETY ADULT  Goal: Patient will remain free of falls  Description: INTERVENTIONS:- Educate patient/family on patient safety including physical limitations- Instruct patient to call for assistance with activity - Consult OT/PT to assist with strengthening/mobility - Keep Call bell within reach- Keep bed low and locked with side rails adjusted as appropriate- Keep care items and personal belongings within reach- Initiate and maintain comfort rounds- Make Fall Risk Sign visible to staff- Offer Toileting every  Hours, in advance of need- Initiate/Maintain alarm- Obtain necessary fall risk management equipment: - Apply yellow socks and bracelet for high fall risk patients- Consider moving patient to room near nurses station  INTERVENTIONS:- Educate patient/family on patient safety including physical limitations- Instruct patient to call for assistance with activity - Consult OT/PT to assist with strengthening/mobility - Keep Call bell within reach- Keep bed low and locked with side rails adjusted as appropriate- Keep care items and personal belongings within reach-  Initiate and maintain comfort rounds- Make Fall Risk Sign visible to staff- Offer Toileting every  Hours, in advance of need- Initiate/Maintain alarm- Obtain necessary fall risk management equipment: - Apply yellow socks and bracelet for high fall risk patients- Consider moving patient to room near nurses station  Outcome: Progressing  Goal: Maintain or return to baseline ADL function  Description: INTERVENTIONS:-  Assess patient's ability to carry out ADLs; assess patient's baseline for ADL function and identify physical deficits which impact ability to perform ADLs (bathing, care of mouth/teeth, toileting, grooming, dressing, etc.)- Assess/evaluate cause of self-care deficits - Assess range of motion- Assess patient's mobility; develop plan if impaired- Assess patient's need for assistive devices and provide as appropriate- Encourage maximum independence but intervene and supervise when necessary- Involve family in performance of ADLs- Assess for home care needs following discharge - Consider OT consult to assist with ADL evaluation and planning for discharge- Provide patient education as appropriate  Outcome: Not Progressing  Goal: Maintains/Returns to pre admission functional level  Description: INTERVENTIONS:- Perform AM-PAC 6 Click Basic Mobility/ Daily Activity assessment daily.- Set and communicate daily mobility goal to care team and patient/family/caregiver. - Collaborate with rehabilitation services on mobility goals if consulted- Perform Range of Motion  times a day.- Reposition patient every  hours.- Dangle patient  times a day- Stand patient  times a day- Ambulate patient  times a day- Out of bed to chair  times a day - Out of bed for meals  times a day- Out of bed for toileting- Record patient progress and toleration of activity level   Outcome: Not Progressing     Problem: DISCHARGE PLANNING  Goal: Discharge to home or other facility with appropriate resources  Description: INTERVENTIONS:- Identify  barriers to discharge w/patient and caregiver- Arrange for needed discharge resources and transportation as appropriate- Identify discharge learning needs (meds, wound care, etc.)- Arrange for interpretive services to assist at discharge as needed- Refer to Case Management Department for coordinating discharge planning if the patient needs post-hospital services based on physician/advanced practitioner order or complex needs related to functional status, cognitive ability, or social support system  Outcome: Progressing     Problem: Knowledge Deficit  Goal: Patient/family/caregiver demonstrates understanding of disease process, treatment plan, medications, and discharge instructions  Description: Complete learning assessment and assess knowledge base.Interventions:- Provide teaching at level of understanding- Provide teaching via preferred learning methods  Outcome: Progressing

## 2025-04-21 NOTE — ASSESSMENT & PLAN NOTE
On chemotherapy, last chemotherapy was 3 weeks back-carboplatin and paclitaxel  Follows up with Dr. Castro and radiation oncology  S/p radiation therapy from July to September, was on Keytruda which was stopped secondary to pneumonitis, on tapering dose of Decadron,   Has had stereotactic radiation to brain twice-in August 2024 in March 2025  Currently on 2 mg Decadron twice daily  If MRI brain shows worsening metastasis, plan on consulting oncology

## 2025-04-21 NOTE — ASSESSMENT & PLAN NOTE
History of hypertension  On amlodipine 5 mg daily  Blood Pressure goal-normotensive  As needed IV labetalol ordered/

## 2025-04-21 NOTE — H&P
H&P - Hospitalist   Name: Ayla Nye 74 y.o. 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  Denies other neurological abnormalities  On Decadron 2 mg twice daily at home  CTA head and neck showed vasogenic edema.  Involving the left parietal and left occipital lobe similar compared to the recent brain MRI.  Small 5 mm hyperdense focus noted within the left parietal occipital lobe for 9 minor percent of small focus of hemorrhage within the dominant lesion.  Redemonstrated Central necrotic mass involving bilateral external anterior right upper lobe with scarring and fibrosis.   Physical examination-1 out of 5 strength in left upper extremity and 3 out of 5 strength in left lower extremity, no sensation abnormalities .  Plan  MRI brain with and without contrast  Neurology has been consulted, recommended getting the MRI and continuing home dose of Decadron  Frequent neurochecks  Continue home dose of Decadron 2 mg twice daily  Will hold Xarelto as there is concern for bleed  Continue keppra  Per neurology , blood pressure goal is normotensive   Frequent neuro checks         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)  On chemotherapy, last chemotherapy was 3 weeks back-carboplatin and paclitaxel  Follows up with Dr. Castro and radiation oncology  S/p radiation therapy from July to September, was on Keytruda which was stopped secondary to pneumonitis, on tapering dose of Decadron,   Has had stereotactic radiation to brain twice-in August 2024 in March 2025  Currently on 2 mg Decadron twice daily  If MRI brain shows worsening metastasis, plan on  consulting oncology  Anemia  History of anemia  And history of vitamin B12 deficiency and essential thrombocytosis  Hemoglobin baseline 8.7-9 .5  Continue to monitor  Hypertension  History of hypertension  On amlodipine 5 mg daily  Blood Pressure goal-normotensive  As needed IV labetalol ordered/  History of pulmonary embolism  Patient has had history of pulmonary embolism  On Xarelto 10 mg daily  Will hold Xarelto as there is concern for hemorrhage in the brain  Hypothyroidism  On levothyroxine 50 mcg daily      VTE Pharmacologic Prophylaxis: VTE Score: 7 High Risk (Score >/= 5) - Pharmacological DVT Prophylaxis Contraindicated. Sequential Compression Devices Ordered.  Code Status: Level 1 - Full Code   Discussion with family: Patient declined call to .     Anticipated Length of Stay: Patient will be admitted on an inpatient basis with an anticipated length of stay of greater than 2 midnights secondary to left upper extremity weakness.    History of Present Illness   Chief Complaint: Worsening left upper extremity weakness    Ayla Nye is a 74 y.o. female with a PMH of stage IV adenocarcinoma on chemotherapy, with metastasis to brain status post radiation, hypertension, history of PE, hypothyroidism, PCP pneumonia, neuropathy, hypertension essential thrombocytosis who presents with worsening left upper extremity weakness .  She has baseline 3/5 left upper and lower extremity weakness from metastatic brain lesion and is on tapering dose of decadron 2 mg Bid at home . She reports that she noticed worsening weakness of left upper extremity , which prompted her to present the ED. Denies worsening of left lower extremity weakness, numbness, sensation abnormalities or other neurologic abnormalities.  Her last chemotherapy was 3 weeks bk. She has had 2 stereotactic brain radiation twice in August 2024 and march 2025.  In the Ed , vitals  stable . Labs remarkable for mild leukocytosis of 11k , hb 8.7.   CTA head and neck showed vasogenic edema.  Involving the left parietal and left occipital lobe similar compared to the recent brain MRI.  Small 5 mm hyperdense focus noted within the left parietal occipital lobe for 9 minor percent of small focus of hemorrhage within the dominant lesion.  Redemonstrated Central necrotic mass involving bilateral external anterior right upper lobe with scarring and fibrosis.  Patient was admitted under internal medicine service for further evaluation and management.       Review of Systems   Constitutional:  Negative for chills and fever.   HENT:  Negative for sore throat.    Eyes:  Negative for visual disturbance.   Respiratory:  Negative for cough and shortness of breath.    Cardiovascular:  Negative for chest pain and palpitations.   Gastrointestinal:  Negative for abdominal pain, constipation, diarrhea, nausea and vomiting.   Genitourinary:  Negative for frequency, hematuria and urgency.   Neurological:  Positive for weakness and headaches.       Historical Information   Past Medical History:   Diagnosis Date    DILLON (acute kidney injury) (HCC) 02/14/2025    Allergic 2022    Allergic to neomiacin and cephalexin    Cancer (HCC)     lung cancer    Cancer (HCC)     mets to brain    Cancer (HCC)     perianal mass    Cataract Nov. 2023    Due to have durgery June 2024    Essential thrombocytosis (HCC)     controlled with medication    History of transfusion     Hyperlipidemia 2015    Hypertension 2011    Mass in chest     Nodular goiter     Osteoporosis     Peripheral neuropathy     Pneumonia Oct 16, 2023    Also  Feb 2024    Psoriasis     SIRS (systemic inflammatory response syndrome) (HCC) 06/22/2024     Past Surgical History:   Procedure Laterality Date    APPENDECTOMY  1954    BREAST CYST EXCISION Right     COLONOSCOPY  10/31/2018    DXA PROCEDURE (HISTORICAL)  04/21/2017    IR BIOPSY OTHER  09/12/2024    IR LUMBAR PUNCTURE  09/12/2024    MAMMO (HISTORICAL)      8-24-18    MAMMO  NEEDLE LOCALIZATION RIGHT (ALL INC) Right 07/13/2009    SKIN LESION EXCISION      bridge of nose     Social History     Tobacco Use    Smoking status: Former     Current packs/day: 1.00     Average packs/day: 1 pack/day for 59.3 years (59.3 ttl pk-yrs)     Types: Cigarettes     Start date: 1966     Passive exposure: Past    Smokeless tobacco: Never    Tobacco comments:     Quit 2010   Vaping Use    Vaping status: Never Used   Substance and Sexual Activity    Alcohol use: Not Currently     Alcohol/week: 5.0 standard drinks of alcohol     Types: 5 Glasses of wine per week    Drug use: Never    Sexual activity: Not Currently     Partners: Male     Birth control/protection: Post-menopausal     E-Cigarette/Vaping    E-Cigarette Use Never User      E-Cigarette/Vaping Substances    Nicotine No     THC No     CBD No     Flavoring No     Other No     Unknown No        Social History:  Marital Status:    Occupation:   Patient Pre-hospital Living Situation: Home  Patient Pre-hospital Level of Mobility:   Patient Pre-hospital Diet Restrictions:     Meds/Allergies   I have reviewed home medications with patient personally.  Prior to Admission medications    Medication Sig Start Date End Date Taking? Authorizing Provider   amLODIPine (NORVASC) 5 mg tablet TAKE 1 TABLET(5 MG) BY MOUTH DAILY 1/20/25  Yes Rafy Angelo MD   dexamethasone (DECADRON) 2 mg tablet Take 1 tablet (2 mg total) by mouth 2 (two) times a day with meals 3/13/25  Yes Rafy Angelo MD   gabapentin (NEURONTIN) 100 mg capsule TAKE 1 CAPSULE BY MOUTH EVERY MORNING, 1 CAPSULE EVERY AFTERNOON AND 3 CAPSULES EVERY NIGHT AT BEDTIME  Patient taking differently: TAKE 2 CAPSULES BY MOUTH EVERY MORNING AND 3 CAPSULES EVERY NIGHT AT BEDTIME (patient did not change her regimen per doctor) 1/13/25  Yes ZULEYMA Pollard   HYDROmorphone (DILAUDID) 2 mg tablet Take 1 tablet (2 mg total) by mouth every 4 (four) hours as needed for moderate pain Max Daily  Amount: 12 mg 3/19/25  Yes ZULEYMA Pollard   levETIRAcetam (KEPPRA) 1000 MG tablet TAKE 1 TABLET(1000 MG) BY MOUTH EVERY 12 HOURS 3/27/25  Yes Rafy Angelo MD   levothyroxine 50 mcg tablet TAKE 1 TABLET(50 MCG) BY MOUTH DAILY EARLY MORNING 4/2/25  Yes Rafy Angelo MD   ondansetron (ZOFRAN) 4 mg tablet Take 1 tablet (4 mg total) by mouth every 8 (eight) hours as needed for nausea or vomiting 2/19/25  Yes Nimco Ptael DO   pantoprazole (PROTONIX) 40 mg tablet Take 1 tablet (40 mg total) by mouth daily in the early morning 4/8/25 5/8/25 Yes Rafy Angelo MD   rivaroxaban (XARELTO) 10 mg tablet Take 1 tablet (10 mg total) by mouth daily with breakfast 2/24/25  Yes Rafy Angelo MD   senna (SENOKOT) 8.6 MG tablet Take 1 tablet by mouth daily Takes one every other day   Yes Giuliano Mejias MD   sulfamethoxazole-trimethoprim (BACTRIM DS) 800-160 mg per tablet Take 1 tablet by mouth 3 (three) times a week Starting 12/24, you can take one tablet Monday, Wednesday, and Fridays. 1/29/25 7/28/25 Yes Destiny Solo MD   vitamin B-12 (VITAMIN B-12) 1,000 mcg tablet Take 1 tablet (1,000 mcg total) by mouth daily 9/14/23  Yes ZULEYMA Boland   sodium chloride (OCEAN) 0.65 % nasal spray 1 spray into each nostril as needed for congestion 1/29/25 4/7/25  Destiny Solo MD     Allergies   Allergen Reactions    Doxycycline Headache    Keflex [Cephalexin] Rash    Neomycin-Polymyxin-Dexameth Eye Swelling       Objective :  Temp:  [97.7 °F (36.5 °C)-98.7 °F (37.1 °C)] 98.3 °F (36.8 °C)  HR:  [80-90] 88  BP: (142-186)/(73-89) 186/89  Resp:  [16-18] 18  SpO2:  [95 %-97 %] 95 %  O2 Device: None (Room air)    Physical Exam  HENT:      Nose: Nose normal.      Mouth/Throat:      Mouth: Mucous membranes are moist.   Eyes:      Extraocular Movements: Extraocular movements intact.      Pupils: Pupils are equal, round, and reactive to light.   Cardiovascular:      Rate and Rhythm: Normal rate and  regular rhythm.      Pulses: Normal pulses.      Heart sounds: Normal heart sounds.   Abdominal:      General: Abdomen is flat.      Palpations: Abdomen is soft.   Skin:     General: Skin is warm.   Neurological:      Mental Status: She is alert and oriented to person, place, and time.      Comments: Left upper extremity strength 1 out of 5  Left lower extremity strength 3 out of 5  Right upper and lower extremity strength 5 out of 5  No sensation abnormalities  No facial deviation pupils normal     Psychiatric:         Mood and Affect: Mood normal.         Behavior: Behavior normal.          Lines/Drains:            Lab Results: I have reviewed the following results:  Results from last 7 days   Lab Units 04/21/25  1026   WBC Thousand/uL 11.40*   HEMOGLOBIN g/dL 8.7*   HEMATOCRIT % 29.7*   PLATELETS Thousands/uL 381   LYMPHO PCT % 4*   MONO PCT % 4   EOS PCT % 0     Results from last 7 days   Lab Units 04/21/25  1026   SODIUM mmol/L 140   POTASSIUM mmol/L 4.1   CHLORIDE mmol/L 109*   CO2 mmol/L 22   BUN mg/dL 23   CREATININE mg/dL 0.84   ANION GAP mmol/L 9   CALCIUM mg/dL 8.9   GLUCOSE RANDOM mg/dL 90     Results from last 7 days   Lab Units 04/21/25  1026   INR  1.29*         Lab Results   Component Value Date    HGBA1C 5.3 07/30/2024           Imaging Results Review: I personally reviewed the following image studies/reports in PACS and discussed pertinent findings with Radiology: CT head. My interpretation of the radiology images/reports is:  .      Administrative Statements       ** Please Note: This note has been constructed using a voice recognition system. **

## 2025-04-21 NOTE — ED ATTENDING ATTESTATION
4/21/2025  I, Rina Faith MD, saw and evaluated the patient. I have discussed the patient with the resident/non-physician practitioner and agree with the resident's/non-physician practitioner's findings, Plan of Care, and MDM as documented in the resident's/non-physician practitioner's note, except where noted. All available labs and Radiology studies were reviewed.  I was present for key portions of any procedure(s) performed by the resident/non-physician practitioner and I was immediately available to provide assistance.       At this point I agree with the current assessment done in the Emergency Department.  I have conducted an independent evaluation of this patient a history and physical is as follows:    74-year-old female with history of lung cancer with metastasis to the brain status post radiation.  Patient presents for evaluation of new left-sided weakness affecting her left arm and left leg.  She first noticed the symptoms about 1 week ago, feels that they are gradually worsening.  No dysarthria or aphasia.  Denies severe headache, although reports an intermittent mild headache that gets better with Tylenol.  Patient is anticoagulated on Xarelto but denies recent falls or head injury.  No acute change in her vision, fevers, or chills.  Last chemotherapy was 1 week ago.    Physical Exam  Vitals and nursing note reviewed.   Constitutional:       General: She is not in acute distress.     Appearance: She is well-developed. She is not diaphoretic.   HENT:      Head: Normocephalic and atraumatic.      Right Ear: External ear normal.      Left Ear: External ear normal.      Nose: Nose normal.   Eyes:      Conjunctiva/sclera: Conjunctivae normal.   Cardiovascular:      Rate and Rhythm: Normal rate and regular rhythm.      Pulses: Normal pulses.      Heart sounds: Normal heart sounds. No murmur heard.     No friction rub. No gallop.   Pulmonary:      Effort: Pulmonary effort is normal. No respiratory  distress.      Breath sounds: Normal breath sounds. No wheezing or rales.   Abdominal:      General: Bowel sounds are normal. There is no distension.      Palpations: Abdomen is soft.      Tenderness: There is no abdominal tenderness. There is no guarding.   Musculoskeletal:         General: No deformity. Normal range of motion.      Cervical back: Normal range of motion and neck supple.      Right lower leg: No edema.      Left lower leg: No edema.   Skin:     General: Skin is warm and dry.   Neurological:      Mental Status: She is alert and oriented to person, place, and time.      Motor: No abnormal muscle tone.      Comments: 5 out of 5 strength in the proximal and distal right hemibody with intact sensation to light touch.  4 out of 5 in the proximal and distal left hemibody with intact sensation to light touch.  Pronator drift noted to the left arm.  Normal finger-to-nose on the right, patient is able to complete the task on the left but with more concentration.  Difficulty performing heel-to-shin on the left due to weakness in her left leg.  Normal heel-to-shin on the right.  Cranial nerves II through XII intact with clear speech.  A&O x 4.   Psychiatric:         Mood and Affect: Mood normal.           ED Course  ED Course as of 04/21/25 1815   Mon Apr 21, 2025   1249 Patient has new left-sided weakness involving the left arm and left leg over the last week.  Given known brain metastasis, CTA of the head and neck were obtained that showed redemonstration of herVasogenic edema involving the left parietal and occipital lobes similar to her recent brain MRI.  She also has a possible small focus of hemorrhage within the dominant lesion of the left parietal lobe.  Resident physician spoke with the reading radiologist who confirmed that this appears stable from her last MRI.  CTA was unremarkable.  Differential includes worsening brain metastasis or subacute stroke.  Patient is outside of the window for  intervention.  Will admit to internal medicine for likely neurology consultation and brain MRI.         Critical Care Time  Procedures

## 2025-04-21 NOTE — TELEPHONE ENCOUNTER
Called and spoke to patient's daughter, Juliana. They are almost at the [facility] ER. ADT order placed.

## 2025-04-21 NOTE — ED PROVIDER NOTES
Time reflects when diagnosis was documented in both MDM as applicable and the Disposition within this note       Time User Action Codes Description Comment    4/21/2025  1:03 PM Ian Gonzales Add [R53.1] Left-sided weakness     4/21/2025  1:03 PM Ian Gonzales Add [C79.9] Metastatic cancer (HCC)     4/21/2025  3:29 PM Naila Olivarez Add [C79.31] Metastatic cancer to brain (HCC)           ED Disposition       ED Disposition   Admit    Condition   Stable    Date/Time   Mon Apr 21, 2025  1:03 PM    Comment   Case was discussed with Dr. Bishop and the patient's admission status was agreed to be Admission Status: inpatient status to the service of Dr. Bishop .               Assessment & Plan       Medical Decision Making  See ED course    Amount and/or Complexity of Data Reviewed  Labs: ordered.  Radiology: ordered. Decision-making details documented in ED Course.    Risk  Prescription drug management.  Decision regarding hospitalization.        ED Course as of 04/21/25 1759   Mon Apr 21, 2025   1000 74-year-old female with history of lung cancer with metastases to the brain presenting for weakness.  Patient states over the past week she has been having progressive worsening of left-sided weakness including the left upper and lower extremities.  No acute onset of worsening weakness.  Is otherwise asymptomatic.  Otherwise notes nausea.  Last dose of chemo about 1 week ago. On xarelto.  No headache, blurry vision, lightheadedness, chest pain, shortness of breath, or other acute symptoms.   1218 CTA head and neck with and without contrast  IMPRESSION:  CT Brain: Vasogenic edema again demonstrated involving the left parietal and left occipital lobes, similar when compared to recent brain MRI. Small 5 mm hyperdense focus noted within the left parietal occipital lobe, which corresponds to susceptibility   and likely represents a small focus of hemorrhage within the dominant lesion in this region on prior brain  MRI. The known intracranial metastatic lesions are otherwise difficult to visualize on this study.     CT Angiography: No large vessel occlusion, high-grade stenosis, or intracranial aneurysm identified on CT angiogram of the head.     No hemodynamically significant stenosis or dissection identified on CT angiogram of the neck.     Centrally necrotic mass again demonstrated involving the paramediastinal anterior right upper lobe, with surrounding scarring/fibrosis.   1237 Clarified with radiologist regarding CTA read for which the 5 mm foci represents a bleed that was present on her MRI 2 weeks ago.   1248 Plan to admit for further evaluation and monitoring of intracranial mass with MRI.       Medications   iohexol (OMNIPAQUE) 350 MG/ML injection (MULTI-DOSE) 70 mL (70 mL Intravenous Given 4/21/25 1148)       ED Risk Strat Scores                    No data recorded        SBIRT 22yo+      Flowsheet Row Most Recent Value   Initial Alcohol Screen: US AUDIT-C     1. How often do you have a drink containing alcohol? 0 Filed at: 04/21/2025 1023   2. How many drinks containing alcohol do you have on a typical day you are drinking?  0 Filed at: 04/21/2025 1023   3b. FEMALE Any Age, or MALE 65+: How often do you have 4 or more drinks on one occassion? 0 Filed at: 04/21/2025 1023   Audit-C Score 0 Filed at: 04/21/2025 1023   ALEXIS: How many times in the past year have you...    Used an illegal drug or used a prescription medication for non-medical reasons? Never Filed at: 04/21/2025 1023                            History of Present Illness       Chief Complaint   Patient presents with    Extremity Weakness     Pt reports left sided weakness since yesterday morning. Also reports increased confusion and fatigue. FAST negative       Past Medical History:   Diagnosis Date    DILLON (acute kidney injury) (HCC) 02/14/2025    Allergic 2022    Allergic to neomiacin and cephalexin    Cancer (HCC)     lung cancer    Cancer (HCC)     mets  to brain    Cancer (HCC)     perianal mass    Cataract 2023    Due to have durgery 2024    Essential thrombocytosis (HCC)     controlled with medication    History of transfusion     Hyperlipidemia     Hypertension     Mass in chest     Nodular goiter     Osteoporosis     Peripheral neuropathy     Pneumonia Oct 16, 2023    Also  2024    Psoriasis     SIRS (systemic inflammatory response syndrome) (HCC) 2024      Past Surgical History:   Procedure Laterality Date    APPENDECTOMY      BREAST CYST EXCISION Right     COLONOSCOPY  10/31/2018    DXA PROCEDURE (HISTORICAL)  2017    IR BIOPSY OTHER  2024    IR LUMBAR PUNCTURE  2024    MAMMO (HISTORICAL)      18    MAMMO NEEDLE LOCALIZATION RIGHT (ALL INC) Right 2009    SKIN LESION EXCISION      bridge of nose      Family History   Problem Relation Age of Onset    Stroke Mother     Depression Mother             Hypertension Mother     Hearing loss Mother     Tuberculosis Father     Cancer Brother         lung    Tuberculosis Brother     Coronary artery disease Brother     Hypertension Brother     Heart disease Brother     No Known Problems Daughter     No Known Problems Daughter     No Known Problems Maternal Grandmother     No Known Problems Maternal Grandfather     No Known Problems Paternal Grandmother     No Known Problems Paternal Grandfather     No Known Problems Maternal Aunt     No Known Problems Maternal Aunt     No Known Problems Maternal Aunt     No Known Problems Maternal Aunt     No Known Problems Paternal Aunt     Cancer Brother         Throat cancer      Social History     Tobacco Use    Smoking status: Former     Current packs/day: 1.00     Average packs/day: 1 pack/day for 59.3 years (59.3 ttl pk-yrs)     Types: Cigarettes     Start date:      Passive exposure: Past    Smokeless tobacco: Never    Tobacco comments:     Quit    Vaping Use    Vaping status: Never Used   Substance Use  Topics    Alcohol use: Not Currently     Alcohol/week: 5.0 standard drinks of alcohol     Types: 5 Glasses of wine per week    Drug use: Never      E-Cigarette/Vaping    E-Cigarette Use Never User       E-Cigarette/Vaping Substances    Nicotine No     THC No     CBD No     Flavoring No     Other No     Unknown No       I have reviewed and agree with the history as documented.     74-year-old female with history of lung cancer with metastases to the brain presenting for weakness.  Patient states over the past week she has been having progressive worsening of left-sided weakness including the left upper and lower extremities.  No acute onset of worsening weakness.  Is otherwise asymptomatic.  Otherwise notes nausea.  Last dose of chemo about 1 week ago. On xarelto.  No headache, blurry vision, lightheadedness, chest pain, shortness of breath, or other acute symptoms.      Extremity Weakness  Associated symptoms: no abdominal pain, no chest pain, no congestion, no cough, no diarrhea, no fever, no headaches, no nausea, no rash, no rhinorrhea, no shortness of breath and no vomiting        Review of Systems   Constitutional:  Negative for chills and fever.   HENT:  Negative for congestion and rhinorrhea.    Respiratory:  Negative for cough and shortness of breath.    Cardiovascular:  Negative for chest pain.   Gastrointestinal:  Negative for abdominal pain, diarrhea, nausea and vomiting.   Genitourinary:  Negative for dysuria, frequency, hematuria and urgency.   Musculoskeletal:  Positive for extremity weakness. Negative for back pain and neck pain.   Skin:  Negative for color change and rash.   Neurological:  Positive for weakness. Negative for syncope, numbness and headaches.           Objective       ED Triage Vitals   Temperature Pulse Blood Pressure Respirations SpO2 Patient Position - Orthostatic VS   04/21/25 0943 04/21/25 0943 04/21/25 0943 04/21/25 0943 04/21/25 0943 04/21/25 0943   98.7 °F (37.1 °C) 80 142/73 18  97 % Sitting      Temp Source Heart Rate Source BP Location FiO2 (%) Pain Score    04/21/25 0943 04/21/25 0943 04/21/25 0943 -- 04/21/25 1448    Oral Monitor Right arm  6      Vitals      Date and Time Temp Pulse SpO2 Resp BP Pain Score FACES Pain Rating User   04/21/25 1600 98.3 °F (36.8 °C) 88 95 % 18 186/89 -- -- MM   04/21/25 1546 -- -- -- -- -- 6 -- MM   04/21/25 1500 97.7 °F (36.5 °C) 84 96 % 18 164/75 -- -- YB   04/21/25 1448 -- -- -- -- -- 6 -- MM   04/21/25 1438 97.7 °F (36.5 °C) 87 97 % 18 167/79 -- -- AL   04/21/25 1358 97.9 °F (36.6 °C) 90 95 % 16 157/81 -- -- LD   04/21/25 0943 98.7 °F (37.1 °C) 80 97 % 18 142/73 -- -- MF            Physical Exam  Constitutional:       General: She is not in acute distress.     Appearance: Normal appearance. She is not toxic-appearing.   HENT:      Head: Normocephalic and atraumatic.      Nose: Nose normal.      Mouth/Throat:      Mouth: Mucous membranes are moist.   Eyes:      Extraocular Movements: Extraocular movements intact.      Pupils: Pupils are equal, round, and reactive to light.   Cardiovascular:      Rate and Rhythm: Normal rate and regular rhythm.   Pulmonary:      Effort: Pulmonary effort is normal. No respiratory distress.   Abdominal:      General: Abdomen is flat. There is no distension.      Palpations: There is no mass.      Tenderness: There is no abdominal tenderness. There is no guarding or rebound.   Musculoskeletal:         General: No swelling or tenderness. Normal range of motion.      Cervical back: Normal range of motion. No tenderness.   Skin:     General: Skin is warm.      Coloration: Skin is not jaundiced or pale.   Neurological:      General: No focal deficit present.      Mental Status: She is alert and oriented to person, place, and time.      Cranial Nerves: No cranial nerve deficit.      Sensory: No sensory deficit.      Motor: Weakness (3/5 to LUE, LLE , elbow flexion/extension, hip flexion) present.      Coordination:  Coordination normal.   Psychiatric:         Mood and Affect: Mood normal.         Behavior: Behavior normal.         Results Reviewed       Procedure Component Value Units Date/Time    RBC Morphology Reflex Test [[id number]] Collected: 04/21/25 1026    Lab Status: Final result Specimen: Blood from Arm, Right Updated: 04/21/25 1402    CBC and differential [[id number]]  (Abnormal) Collected: 04/21/25 1026    Lab Status: Final result Specimen: Blood from Arm, Right Updated: 04/21/25 1319     WBC 11.40 Thousand/uL      RBC 3.13 Million/uL      Hemoglobin 8.7 g/dL      Hematocrit 29.7 %      MCV 95 fL      MCH 27.8 pg      MCHC 29.3 g/dL      RDW 17.3 %      MPV 9.2 fL      Platelets 381 Thousands/uL     Narrative:      This is an appended report.  These results have been appended to a previously verified report.    Manual Differential(PHLEBS Do Not Order) [[id number]]  (Abnormal) Collected: 04/21/25 1026    Lab Status: Final result Specimen: Blood from Arm, Right Updated: 04/21/25 1319     Segmented % 88 %      Lymphocytes % 4 %      Monocytes % 4 %      Eosinophils % 0 %      Basophils % 0 %      Metamyelocytes % 2 %      Promyelocytes % 1 %      Atypical Lymphocytes % 1 %      Absolute Neutrophils 10.03 Thousand/uL      Absolute Lymphocytes 0.57 Thousand/uL      Absolute Monocytes 0.46 Thousand/uL      Absolute Eosinophils 0.00 Thousand/uL      Absolute Basophils 0.00 Thousand/uL      Absolute Metamyelocytes 0.23 Thousand/uL      Absolute Promyelocytes 0.11 Thousand/uL      Total Counted --     RBC Morphology Present     Platelet Estimate Increased     Anisocytosis Present     Macrocytes Present     Microcytes Present     Poikilocytes Present     Polychromasia Present    Protime-INR [[id number]]  (Abnormal) Collected: 04/21/25 1026    Lab Status: Final result Specimen: Blood from Arm, Right Updated: 04/21/25 1124     Protime 16.9 seconds      INR 1.29    Narrative:      INR Therapeutic Range    Indication                                              INR Range      Atrial Fibrillation                                               2.0-3.0  Hypercoagulable State                                    2.0.2.3  Left Ventricular Asist Device                            2.0-3.0  Mechanical Heart Valve                                  -    Aortic(with afib, MI, embolism, HF, LA enlargement,    and/or coagulopathy)                                     2.0-3.0 (2.5-3.5)     Mitral                                                             2.5-3.5  Prosthetic/Bioprosthetic Heart Valve               2.0-3.0  Venous thromboembolism (VTE: VT, PE        2.0-3.0    Basic metabolic panel [[id number]]  (Abnormal) Collected: 04/21/25 1026    Lab Status: Final result Specimen: Blood from Arm, Right Updated: 04/21/25 1112     Sodium 140 mmol/L      Potassium 4.1 mmol/L      Chloride 109 mmol/L      CO2 22 mmol/L      ANION GAP 9 mmol/L      BUN 23 mg/dL      Creatinine 0.84 mg/dL      Glucose 90 mg/dL      Calcium 8.9 mg/dL      eGFR 68 ml/min/1.73sq m     Narrative:      National Kid[employer] Foundation guidelines for Chronic Kidney Disease (CKD):     Stage 1 with normal or high GFR (GFR > 90 mL/min/1.73 square meters)    Stage 2 Mild CKD (GFR = 60-89 mL/min/1.73 square meters)    Stage 3A Moderate CKD (GFR = 45-59 mL/min/1.73 square meters)    Stage 3B Moderate CKD (GFR = 30-44 mL/min/1.73 square meters)    Stage 4 Severe CKD (GFR = 15-29 mL/min/1.73 square meters)    Stage 5 End Stage CKD (GFR <15 mL/min/1.73 square meters)  Note: GFR calculation is accurate only with a steady state creatinine            CTA head and neck with and without contrast   Final Interpretation by Yobani Medrano MD (04/21 1214)   CT Brain: Vasogenic edema again demonstrated involving the left parietal and left occipital lobes, similar when compared to recent brain MRI. Small 5 mm hyperdense focus noted within the left parietal occipital lobe, which corresponds to susceptibility     and likely represents a small focus of hemorrhage within the dominant lesion in this region on prior brain MRI. The known intracranial metastatic lesions are otherwise difficult to visualize on this study.      CT Angiography: No large vessel occlusion, high-grade stenosis, or intracranial aneurysm identified on CT angiogram of the head.      No hemodynamically significant stenosis or dissection identified on CT angiogram of the neck.      Centrally necrotic mass again demonstrated involving the paramediastinal anterior right upper lobe, with surrounding scarring/fibrosis.                  Workstation performed: [id number]         MRI brain w wo contrast    (Results Pending)       Procedures    ED Medication and Procedure Management   Prior to Admission Medications   Prescriptions Last Dose Informant Patient Reported? Taking?   HYDROmorphone (DILAUDID) 2 mg tablet 4/20/2025  No Yes   Sig: Take 1 tablet (2 mg total) by mouth every 4 (four) hours as needed for moderate pain Max Daily Amount: 12 mg   amLODIPine (NORVASC) 5 mg tablet 4/21/2025  No Yes   Sig: TAKE 1 TABLET(5 MG) BY MOUTH DAILY   dexamethasone (DECADRON) 2 mg tablet 4/21/2025  No Yes   Sig: Take 1 tablet (2 mg total) by mouth 2 (two) times a day with meals   gabapentin (NEURONTIN) 100 mg capsule 4/21/2025  No Yes   Sig: TAKE 1 CAPSULE BY MOUTH EVERY MORNING, 1 CAPSULE EVERY AFTERNOON AND 3 CAPSULES EVERY NIGHT AT BEDTIME   Patient taking differently: TAKE 2 CAPSULES BY MOUTH EVERY MORNING AND 3 CAPSULES EVERY NIGHT AT BEDTIME (patient did not change her regimen per doctor)   levETIRAcetam (KEPPRA) 1000 MG tablet 4/21/2025  No Yes   Sig: TAKE 1 TABLET(1000 MG) BY MOUTH EVERY 12 HOURS   levothyroxine 50 mcg tablet 4/21/2025  No Yes   Sig: TAKE 1 TABLET(50 MCG) BY MOUTH DAILY EARLY MORNING   ondansetron (ZOFRAN) 4 mg tablet Past Month  No Yes   Sig: Take 1 tablet (4 mg total) by mouth every 8 (eight) hours as needed for nausea or vomiting   pantoprazole  (PROTONIX) 40 mg tablet 2025  No Yes   Sig: Take 1 tablet (40 mg total) by mouth daily in the early morning   rivaroxaban (XARELTO) 10 mg tablet 2025  No Yes   Sig: Take 1 tablet (10 mg total) by mouth daily with breakfast   senna (SENOKOT) 8.6 MG tablet 2025 Self Yes Yes   Sig: Take 1 tablet by mouth daily Takes one every other day   sodium chloride (OCEAN) 0.65 % nasal spray   No No   Si spray into each nostril as needed for congestion   sulfamethoxazole-trimethoprim (BACTRIM DS) 800-160 mg per tablet 2025  No Yes   Sig: Take 1 tablet by mouth 3 (three) times a week Starting , you can take one tablet Monday, Wednesday, and .   vitamin B-12 (VITAMIN B-12) 1,000 mcg tablet 2025 Self No Yes   Sig: Take 1 tablet (1,000 mcg total) by mouth daily      Facility-Administered Medications: None     Current Discharge Medication List        CONTINUE these medications which have NOT CHANGED    Details   amLODIPine (NORVASC) 5 mg tablet TAKE 1 TABLET(5 MG) BY MOUTH DAILY  Qty: 30 tablet, Refills: 5    Associated Diagnoses: Primary hypertension      dexamethasone (DECADRON) 2 mg tablet Take 1 tablet (2 mg total) by mouth 2 (two) times a day with meals  Qty: 180 tablet, Refills: 0    Associated Diagnoses: Complication of chemotherapy, initial encounter; Psoriatic arthritis (HCC)      gabapentin (NEURONTIN) 100 mg capsule TAKE 1 CAPSULE BY MOUTH EVERY MORNING, 1 CAPSULE EVERY AFTERNOON AND 3 CAPSULES EVERY NIGHT AT BEDTIME  Qty: 150 capsule, Refills: 5    Associated Diagnoses: Palliative care patient; Cancer related pain      HYDROmorphone (DILAUDID) 2 mg tablet Take 1 tablet (2 mg total) by mouth every 4 (four) hours as needed for moderate pain Max Daily Amount: 12 mg  Qty: 90 tablet, Refills: 0    Associated Diagnoses: Metastatic cancer to brain (HCC); Metastatic adenocarcinoma (HCC); 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); Palliative care patient; Cancer associated pain       levETIRAcetam (KEPPRA) 1000 MG tablet TAKE 1 TABLET(1000 MG) BY MOUTH EVERY 12 HOURS  Qty: 60 tablet, Refills: 5    Associated Diagnoses: Metastatic cancer to brain (HCC)      levothyroxine 50 mcg tablet TAKE 1 TABLET(50 MCG) BY MOUTH DAILY EARLY MORNING  Qty: 30 tablet, Refills: 5    Associated Diagnoses: Acquired hypothyroidism      ondansetron (ZOFRAN) 4 mg tablet Take 1 tablet (4 mg total) by mouth every 8 (eight) hours as needed for nausea or vomiting  Qty: 30 tablet, Refills: 2    Associated Diagnoses: Complication of chemotherapy      pantoprazole (PROTONIX) 40 mg tablet Take 1 tablet (40 mg total) by mouth daily in the early morning  Qty: 30 tablet, Refills: 0    Associated Diagnoses: Metastatic cancer to brain (HCC)      rivaroxaban (XARELTO) 10 mg tablet Take 1 tablet (10 mg total) by mouth daily with breakfast  Qty: 90 tablet, Refills: 0    Associated Diagnoses: History of pulmonary embolus (PE)      senna (SENOKOT) 8.6 MG tablet Take 1 tablet by mouth daily Takes one every other day      sulfamethoxazole-trimethoprim (BACTRIM DS) 800-160 mg per tablet Take 1 tablet by mouth 3 (three) times a week Starting 12/24, you can take one tablet Monday, Wednesday, and Fridays.  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  ED SEPSIS DOCUMENTATION   Time reflects when diagnosis was documented in both MDM as applicable and the Disposition within this note       Time User Action Codes Description Comment    4/21/2025  1:03 PM Ian Gonzales Add [R53.1] Left-sided weakness     4/21/2025   1:03 PM Ian Gonzales [C79.9] Metastatic cancer (HCC)     4/21/2025  3:29 PM Naila Olivarez [C79.31] Metastatic cancer to brain (HCC)                  Ian Gonzales MD  04/21/25 1753

## 2025-04-21 NOTE — ASSESSMENT & PLAN NOTE
Patient has had history of pulmonary embolism  On Xarelto 10 mg daily  Will hold Xarelto as there is concern for hemorrhage in the brain

## 2025-04-22 ENCOUNTER — APPOINTMENT (INPATIENT)
Dept: NON INVASIVE DIAGNOSTICS | Facility: HOSPITAL | Age: 75
DRG: 064 | End: 2025-04-22
Payer: MEDICARE

## 2025-04-22 LAB
ANION GAP SERPL CALCULATED.3IONS-SCNC: 10 MMOL/L (ref 4–13)
ANISOCYTOSIS BLD QL SMEAR: PRESENT
BASOPHILS # BLD MANUAL: 0 THOUSAND/UL (ref 0–0.1)
BASOPHILS NFR MAR MANUAL: 0 % (ref 0–1)
BUN SERPL-MCNC: 17 MG/DL (ref 5–25)
CALCIUM SERPL-MCNC: 8.9 MG/DL (ref 8.4–10.2)
CHLORIDE SERPL-SCNC: 106 MMOL/L (ref 96–108)
CO2 SERPL-SCNC: 23 MMOL/L (ref 21–32)
CREAT SERPL-MCNC: 0.81 MG/DL (ref 0.6–1.3)
EOSINOPHIL # BLD MANUAL: 0 THOUSAND/UL (ref 0–0.4)
EOSINOPHIL NFR BLD MANUAL: 0 % (ref 0–6)
ERYTHROCYTE [DISTWIDTH] IN BLOOD BY AUTOMATED COUNT: 17.2 % (ref 11.6–15.1)
GFR SERPL CREATININE-BSD FRML MDRD: 71 ML/MIN/1.73SQ M
GLUCOSE SERPL-MCNC: 88 MG/DL (ref 65–140)
HCT VFR BLD AUTO: 28.9 % (ref 34.8–46.1)
HGB BLD-MCNC: 8.6 G/DL (ref 11.5–15.4)
LYMPHOCYTES # BLD AUTO: 0.4 THOUSAND/UL (ref 0.6–4.47)
LYMPHOCYTES # BLD AUTO: 4 % (ref 14–44)
MCH RBC QN AUTO: 28 PG (ref 26.8–34.3)
MCHC RBC AUTO-ENTMCNC: 29.8 G/DL (ref 31.4–37.4)
MCV RBC AUTO: 94 FL (ref 82–98)
METAMYELOCYTE ABSOLUTE CT: 0.1 THOUSAND/UL (ref 0–0.1)
METAMYELOCYTES NFR BLD MANUAL: 1 % (ref 0–1)
MONOCYTES # BLD AUTO: 0.51 THOUSAND/UL (ref 0–1.22)
MONOCYTES NFR BLD: 5 % (ref 4–12)
MYELOCYTE ABSOLUTE CT: 0.2 THOUSAND/UL (ref 0–0.1)
MYELOCYTES NFR BLD MANUAL: 2 % (ref 0–1)
NEUTROPHILS # BLD MANUAL: 8.89 THOUSAND/UL (ref 1.85–7.62)
NEUTS SEG NFR BLD AUTO: 88 % (ref 43–75)
PLATELET # BLD AUTO: 387 THOUSANDS/UL (ref 149–390)
PLATELET BLD QL SMEAR: ABNORMAL
PMV BLD AUTO: 8.8 FL (ref 8.9–12.7)
POLYCHROMASIA BLD QL SMEAR: PRESENT
POTASSIUM SERPL-SCNC: 3.9 MMOL/L (ref 3.5–5.3)
RBC # BLD AUTO: 3.07 MILLION/UL (ref 3.81–5.12)
RBC MORPH BLD: PRESENT
SODIUM SERPL-SCNC: 139 MMOL/L (ref 135–147)
WBC # BLD AUTO: 10.1 THOUSAND/UL (ref 4.31–10.16)

## 2025-04-22 PROCEDURE — 99232 SBSQ HOSP IP/OBS MODERATE 35: CPT | Performed by: INTERNAL MEDICINE

## 2025-04-22 PROCEDURE — 85027 COMPLETE CBC AUTOMATED: CPT

## 2025-04-22 PROCEDURE — 99223 1ST HOSP IP/OBS HIGH 75: CPT | Performed by: STUDENT IN AN ORGANIZED HEALTH CARE EDUCATION/TRAINING PROGRAM

## 2025-04-22 PROCEDURE — 99223 1ST HOSP IP/OBS HIGH 75: CPT | Performed by: NURSE PRACTITIONER

## 2025-04-22 PROCEDURE — 80048 BASIC METABOLIC PNL TOTAL CA: CPT

## 2025-04-22 PROCEDURE — 85007 BL SMEAR W/DIFF WBC COUNT: CPT

## 2025-04-22 RX ORDER — MAGNESIUM HYDROXIDE/ALUMINUM HYDROXICE/SIMETHICONE 120; 1200; 1200 MG/30ML; MG/30ML; MG/30ML
30 SUSPENSION ORAL EVERY 4 HOURS PRN
Status: DISCONTINUED | OUTPATIENT
Start: 2025-04-22 | End: 2025-04-25 | Stop reason: HOSPADM

## 2025-04-22 RX ORDER — ASPIRIN 81 MG/1
81 TABLET, CHEWABLE ORAL DAILY
Status: DISCONTINUED | OUTPATIENT
Start: 2025-04-23 | End: 2025-04-25 | Stop reason: HOSPADM

## 2025-04-22 RX ADMIN — ACETAMINOPHEN 975 MG: 325 TABLET, FILM COATED ORAL at 04:43

## 2025-04-22 RX ADMIN — Medication 1000 MCG: at 08:58

## 2025-04-22 RX ADMIN — ACETAMINOPHEN 975 MG: 325 TABLET, FILM COATED ORAL at 21:01

## 2025-04-22 RX ADMIN — ALUMINUM HYDROXIDE, MAGNESIUM HYDROXIDE, AND DIMETHICONE 30 ML: 200; 20; 200 SUSPENSION ORAL at 15:32

## 2025-04-22 RX ADMIN — DEXAMETHASONE 2 MG: 2 TABLET ORAL at 16:30

## 2025-04-22 RX ADMIN — AMLODIPINE BESYLATE 5 MG: 5 TABLET ORAL at 08:58

## 2025-04-22 RX ADMIN — DEXAMETHASONE 2 MG: 2 TABLET ORAL at 06:41

## 2025-04-22 RX ADMIN — GABAPENTIN 300 MG: 300 CAPSULE ORAL at 21:00

## 2025-04-22 RX ADMIN — SENNOSIDES 8.6 MG: 8.6 TABLET, FILM COATED ORAL at 08:58

## 2025-04-22 RX ADMIN — Medication 3 MG: at 21:01

## 2025-04-22 RX ADMIN — Medication 3 MG: at 00:16

## 2025-04-22 RX ADMIN — LEVOTHYROXINE SODIUM 50 MCG: 0.05 TABLET ORAL at 04:43

## 2025-04-22 RX ADMIN — LEVETIRACETAM 1000 MG: 500 TABLET, FILM COATED ORAL at 08:57

## 2025-04-22 RX ADMIN — GABAPENTIN 100 MG: 100 CAPSULE ORAL at 14:03

## 2025-04-22 RX ADMIN — HYDROMORPHONE HYDROCHLORIDE 2 MG: 2 TABLET ORAL at 20:53

## 2025-04-22 RX ADMIN — GABAPENTIN 100 MG: 100 CAPSULE ORAL at 08:58

## 2025-04-22 RX ADMIN — PANTOPRAZOLE SODIUM 40 MG: 40 TABLET, DELAYED RELEASE ORAL at 06:41

## 2025-04-22 RX ADMIN — ACETAMINOPHEN 975 MG: 325 TABLET, FILM COATED ORAL at 14:04

## 2025-04-22 RX ADMIN — LEVETIRACETAM 1000 MG: 500 TABLET, FILM COATED ORAL at 20:52

## 2025-04-22 NOTE — PLAN OF CARE
Problem: NEUROSENSORY - ADULT  Goal: Achieves stable or improved neurological status  Description: INTERVENTIONS- Monitor and report changes in neurological status- Monitor vital signs such as temperature, blood pressure, glucose, and any other labs ordered - Initiate measures to prevent increased intracranial pressure- Monitor for seizure activity and implement precautions if appropriate    Outcome: Progressing  Goal: Remains free of injury related to seizures activity  Description: INTERVENTIONS- Maintain airway, patient safety  and administer oxygen as ordered- Monitor patient for seizure activity, document and report duration and description of seizure to physician/advanced practitioner- If seizure occurs,  ensure patient safety during seizure- Reorient patient post seizure- Seizure pads on all 4 side rails- Instruct patient/family to notify RN of any seizure activity including if an aura is experienced- Instruct patient/family to call for assistance with activity based on nursing assessment- Administer anti-seizure medications if ordered  Outcome: Progressing  Goal: Achieves maximal functionality and self care  Description: INTERVENTIONS- Monitor swallowing and airway patency with patient fatigue and changes in neurological status- Encourage and assist patient to increase activity and self care. - Encourage visually impaired, hearing impaired and aphasic patients to use assistive/communication devices  Outcome: Progressing

## 2025-04-22 NOTE — ASSESSMENT & PLAN NOTE
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  Denies other neurological abnormalities  On Decadron 2 mg twice daily at home  CTA head and neck showed vasogenic edema involving the left parietal and left occipital lobe similar compared to the recent brain MRI.  Small 5 mm hyperdense focus noted within the left parietal occipital lobe which corresponds to susceptibility and likely represents a small focus of hemorrhage within the dominant lesion in this region on prior brain MRI.  Re-demonstrated central necrotic mass involving paramediastinal anterior right upper lobe with surrounding scarring/fibrosis  Admission initial physical examination-1 out of 5 strength in left upper extremity and 3 out of 5 strength in left lower extremity, no sensation abnormalities  MRI brain showing stable appearance of treated left brain metastases without any new metastases.  There is a small foci of right frontal diffusion signal most compatible with subacute infarcts    Plan  Neurology has been consulted, appreciate recommendations  Home Xarelto currently held and aspirin 81 mg started   Follow-up echocardiogram  Frequent neurochecks  Stroke pathway with hemoglobin A1c and lipid panel  Continue home dose of Decadron 2 mg twice daily  Continue keppra

## 2025-04-22 NOTE — PLAN OF CARE
Problem: INFECTION - ADULT  Goal: Absence or prevention of progression during hospitalization  Description: INTERVENTIONS:- Assess and monitor for signs and symptoms of infection- Monitor lab/diagnostic results- Monitor all insertion sites, i.e. indwelling lines, tubes, and drains- Monitor endotracheal if appropriate and nasal secretions for changes in amount and color- [place] appropriate cooling/warming therapies per order- Administer medications as ordered- Instruct and encourage patient and family to use good hand hygiene technique- Identify and instruct in appropriate isolation precautions for identified infection/condition  Outcome: Progressing     Problem: INFECTION - ADULT  Goal: Absence of fever/infection during neutropenic period  Description: INTERVENTIONS:- Monitor WBC  Outcome: Progressing

## 2025-04-22 NOTE — ASSESSMENT & PLAN NOTE
Patient is a 74-year-old female with stage IV metastatic lung adenocarcinoma who has poorly tolerated systemic therapy and required multiple admissions due to treatment related toxicities, PCP pneumonia, development of PE on Xarelto  She has been treated with carboplatin/pemetrexed, s/p SRS in 8/2024 and 1/2025 with ongoing dexamethasone use as patient has episodes of worsening fatigue and confusion when steroids are tapered.  Current dose of dexamethasone 2 mg BID  More recently, she has been started on single agent Taxotere  Last treated on 4/4/25    Admitted with worsening weakness of left upper extremity.  CTA head and neck showed vasogenic edema involving the left parietal and left occipital lobe similar compared to the recent brain MRI.  Small 5 mm hyperdense focus noted within the left parietal occipital lobe which corresponds to susceptibility and likely represents a small focus of hemorrhage within the dominant lesion in this region on prior brain MRI.  Re-demonstrated central necrotic mass involving paramediastinal anterior right upper lobe with surrounding scarring/fibrosis  MRI brain showing stable appearance of treated left brain metastases without any new metastases.  There is a small foci of right frontal diffusion signal most compatible with subacute infarcts

## 2025-04-22 NOTE — CONSULTS
Consultation - Oncology-Medical   Name: Ayla Nye 74 y.o. female I MRN: [id number]  Unit/Bed#: -01 I Date of Admission: 4/21/2025   Date of Service: 4/22/2025 I Hospital Day: 1   Inpatient consult to Oncology  Consult performed by: ZULEYMA Huitron  Consult ordered by: Naila Olivarez MD        Physician Requesting Evaluation: Perla Law MD   Reason for Evaluation / Principal Problem: Izetta cancer to the brain    Assessment & Plan  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)  Patient is a 74-year-old female with stage IV metastatic lung adenocarcinoma who has poorly tolerated systemic therapy and required multiple admissions due to treatment related toxicities, PCP pneumonia, development of PE on Xarelto  She has been treated with carboplatin/pemetrexed, s/p SRS in 8/2024 and 1/2025 with ongoing dexamethasone use as patient has episodes of worsening fatigue and confusion when steroids are tapered.  Current dose of dexamethasone 2 mg BID  More recently, she has been started on single agent Taxotere  Last treated on 4/4/25    Admitted with worsening weakness of left upper extremity.  CTA head and neck showed vasogenic edema involving the left parietal and left occipital lobe similar compared to the recent brain MRI.  Small 5 mm hyperdense focus noted within the left parietal occipital lobe which corresponds to susceptibility and likely represents a small focus of hemorrhage within the dominant lesion in this region on prior brain MRI.  Re-demonstrated central necrotic mass involving paramediastinal anterior right upper lobe with surrounding scarring/fibrosis  MRI brain showing stable appearance of treated left brain metastases without any new metastases.  There is a small foci of right frontal diffusion signal most compatible with subacute infarcts    History of pulmonary embolism  On Xarelto for history of PE  Patient has high risk for recurrent VTE in the  "context of underlying metastatic lung cancer  Xarelto currently on hold given concern for possible hemorrhagic stroke  Worsening weakness of left upper extremity  As above  Neurology following, appreciate recommendations  Recommending holding Xarelto.  Aspirin 81 mg started.    PLAN:  Patient has metastatic stage IV adenocarcinoma of the lung.  She has progressed through first-line therapy and has overall poor prognosis.  Admitted now with concern for hemorrhagic stroke.  She is most appropriate for hospice.  However, is not ready for hospice care at this time.  She is concerned about her next scheduled chemotherapy which is due later this week.  I reviewed with patient we will place chemotherapy scheduled for this week on hold while she is hospitalized.  Will arrange follow-up with Dr. Castro on discharge to reevaluate appropriateness of resuming systemic therapy.  Risk versus benefit of Xarelto discussed.  Patient does have high risk for VTE given underlying metastatic malignancy.  However with concern for hemorrhagic stroke risk at this time outweighs benefit.  Agree with neurology's recommendations for baby aspirin.          I have discussed the above management plan in detail with the primary service.     History of Present Illness   Ayla Nye is a 74 y.o. female who presents with worsening left upper extremity weakness.  Patient is well-known to medical oncology for her history of stage IV adenocarcinoma with brain metastasis, essential thrombocytosis, history of PE on Xarelto.  She follows with Dr. Castro     Per Dr Castro clinic note dated 4/14/25:  \"Ayla Nye is a 74 y.o. female with medical hx remarkable of HTN, HLD, Nodular Goiter, Osteoprosis, Psoriasis, essential thrombocytosis, Pulmonary Embolism, lung adenocarcinoma as outlined below, and had superficial perianal mass biopsy showed squamous cell carcinoma s/p R gluteus radiation therapy completed 10/18/2024 .      history of " [id number] (IIIB) lung adenocarcinoma of the right upper lobe, s/p concurrent chemoRT completing on 7/5/24. She completed a course of SRS on 8/8/24 after MRI brain demonstrated five supra- and infratentorial enhancing lesions compatible with metastases. She also completed a course of palliative radiation to right gluteus (for superficial squamous cell lesion) on 10/18/24.      On treatment with Carboplatin AUC 5 Pemetrexed 500 mg/m2 and Pembrolizumab 200 mg IV every 3 weeks x 4 cycles. Cycle 2 was completed on 11/14/24.  Unfortunately her therapy has been interrupted due to frequent hospitalizations.     History of [id number] (IIIB) lung adenocarcinoma of the right upper lobe, s/p concurrent chemoRT completing on 7/5/24. She completed a course of SRS on 8/8/24 after MRI brain demonstrated five supra- and infratentorial enhancing lesions compatible with metastases. She also completed a course of palliative radiation to right gluteus (for superficial squamous cell lesion) on 10/18/24.      Unfortunately her therapy has been interrupted due to frequent hospitalization, despite dose reduction.     Last chemotherapy on 2/13/2025 when she received carboplatin AUC 4 reduced from AUC 5, Pemetrexed 300 mg/m² which is reduced from 500 mg/m², bevacizumab 5 mg/kg, unfortunately developed fever, nausea and generalized weakness needing hospitalization again from 2/14 to 2/19 where she made SIRS criteria and was started on broad-spectrum antibiotics however infectious workup was negative.     3/17/2025     As above multiple issues with pain, weakness, fatigue.  Headaches less.  Currently on decadron 2 mg bid.  As above, will start treatment with Taxotere single agent     Summary/Recommendations     Single agent Taxotere C1 was 4/4/2025     C2 4/24/2025 will dose reduce to 60 mg/m2          Review of Systems   Constitutional:  Positive for fatigue.   Neurological:  Positive for weakness.   All other systems reviewed and are  negative.    Historical Information   Medical History Review: I have reviewed the patient's PMH, PSH, Social History, Family History, Meds, and Allergies     Oncology History:   Cancer Staging   Metastatic adenocarcinoma (HCC)  Staging form: Lung, AJCC 8th Edition  - Clinical stage from 4/15/2024: Stage IIIB (cT2b, cN3, cM0) - Signed by Rogelio Beebe DO on 4/15/2024  - Clinical: Stage IV (cM1) - Signed by Honey Gamboa MD on 9/23/2024    Oncology History   Metastatic adenocarcinoma (HCC)   2024 Initial Diagnosis    Primary lung adenocarcinoma, right (HCC)     3/26/2024 Biopsy    A-C. Lymph Node, Level 4R :    - Metastatic non-small cell carcinoma, most compatible with a primary lung adenocarcinoma; see note.       D-F. Lymph Node, Level 10R:    - Metastatic non-small cell carcinoma; see note.       G-I. Lymph Node, Level 4L:    - Metastatic non-small cell carcinoma; see note.       4/15/2024 -  Cancer Staged    Staging form: Lung, AJCC 8th Edition  - Clinical stage from 4/15/2024: Stage IIIB (cT2b, cN3, cM0) - Signed by Rogelio Beebe DO on 4/15/2024       5/23/2024 - 7/2/2024 Chemotherapy    alteplase (CATHFLO), 2 mg, Intracatheter, Every 1 Minute as needed, 6 of 6 cycles  CARBOplatin (PARAPLATIN) IVPB (GOG AUC DOSING), 130.4 mg, Intravenous, Once, 6 of 6 cycles  Administration: 130.4 mg (5/23/2024), 144.8 mg (5/30/2024), 138.4 mg (6/6/2024), 144.8 mg (6/13/2024), 132 mg (6/21/2024), 143.6 mg (7/2/2024)  PACLItaxel (TAXOL) chemo IVPB, 45 mg/m2 = 67.2 mg (90 % of original dose 50 mg/m2), Intravenous, Once, 6 of 6 cycles  Dose modification: 45 mg/m2 (original dose 50 mg/m2, Cycle 1, Reason: Anticipated Tolerance)  Administration: 67.2 mg (5/23/2024), 67.2 mg (5/30/2024), 67.2 mg (6/6/2024), 67.2 mg (6/13/2024), 67.2 mg (6/21/2024), 67.2 mg (7/2/2024)     5/23/2024 - 7/5/2024 Radiation    Treatment:  Course: C1    Plan ID Energy Fractions Dose per Fraction (cGy) Dose Correction (cGy) Total Dose  Delivered (cGy) Elapsed Days   R Lung_Hilum 6X 30 / 30 200 0 6,000 43      Treatment dates:  C1: 5/23/2024 - 7/5/2024 8/8/2024 - 8/8/2024 Radiation    SRS 5 PTVs   2000 cGy     9/12/2024 Biopsy    Mass, Superficial perianal mass:  - Squamous cell carcinoma.     Note: The patient's prior lung EBUS sampling shows the tumor to stain for TTF1 and Napsin with absent p40 expression.  The current perianal mass sampling shows diffuse p40 expression with absent TTF1 expression.  This may represent a primary anal/perianal squamous cell carcinoma versus a possible metastatic combined lung tumor (adenocarcinoma and previously unsampled squamous component).  Suggest clinical correlation and appropriate follow-up.         9/23/2024 -  Cancer Staged    Staging form: Lung, AJCC 8th Edition  - Clinical: Stage IV (cM1) - Signed by Honey Gamboa MD on 9/23/2024       10/1/2024 - 10/18/2024 Radiation    Course: C3    Plan ID Energy Fractions Dose per Fraction (cGy) Dose Correction (cGy) Total Dose Delivered (cGy) Elapsed Days   R Gluteus:1 10X/6X 14 / 15 300 0 4,200 17      Treatment Dates:  10/1/2024 - 10/18/2024.       10/7/2024 - 2/13/2025 Chemotherapy    cyanocobalamin, 1,000 mcg, Intramuscular, Once, 2 of 3 cycles  Administration: 1,000 mcg (8/19/2024), 1,000 mcg (1/23/2025)  alteplase (CATHFLO), 2 mg, Intracatheter, Every 1 Minute as needed, 4 of 6 cycles  palonosetron (ALOXI), 0.25 mg, Intravenous, Once, 2 of 4 cycles  Administration: 0.25 mg (1/23/2025), 0.25 mg (2/13/2025)  fosaprepitant (EMEND) IVPB, 150 mg, Intravenous, Once, 4 of 6 cycles  Administration: 150 mg (10/7/2024), 150 mg (1/23/2025), 150 mg (2/13/2025), 150 mg (11/14/2024)  CARBOplatin (PARAPLATIN) IVPB (GOG AUC DOSING), 347 mg, Intravenous, Once, 4 of 6 cycles  Administration: 347 mg (10/7/2024), 256.8 mg (1/23/2025), 278.4 mg (2/13/2025), 346 mg (11/14/2024)  bevacizumab (AVASTIN) IVPB, 5 mg/kg = 280 mg (100 % of original dose 5 mg/kg), Intravenous,  Once, 1 of 3 cycles  Dose modification: 5 mg/kg (original dose 5 mg/kg, Cycle 4, Reason: Anticipated Tolerance)  Administration: 280 mg (2/13/2025)  pemetrexed (ALIMTA) chemo infusion, 735 mg, Intravenous, Once, 4 of 6 cycles  Dose modification: 300 mg/m2 (original dose 500 mg/m2, Cycle 4, Reason: Anticipated Tolerance)  Administration: 700 mg (10/7/2024), 700 mg (1/23/2025), 468 mg (2/13/2025), 700 mg (11/14/2024)  pembrolizumab (KEYTRUDA) IVPB, 200 mg, Intravenous, Once, 2 of 2 cycles  Administration: 200 mg (11/14/2024), 200 mg (10/7/2024)     1/16/2025 - 1/16/2025 Radiation    Course: C4  Plan ID Energy Fractions Dose per Fraction (cGy) Dose Correction (cGy) Total Dose Delivered (cGy) Elapsed Days   SRS L_Front 6X-FFF 1 / 1 2,000 0 2,000 0      Treatment dates:  C4 SRS: 1/16/2025 - 1/16/2025         4/3/2025 -  Chemotherapy    DOCEtaxel (TAXOTERE) chemo infusion, 75 mg/m2 = 114 mg, 1 of 6 cycles  Dose modification: 60 mg/m2 (original dose 75 mg/m2, Cycle 2, Reason: Anticipated Tolerance)  Administration: 114 mg (4/3/2025)     Metastatic cancer to brain (HCC)   7/29/2024 Initial Diagnosis    Metastatic cancer to brain (HCC)     8/8/2024 - 8/8/2024 Radiation    SRS 5 PTVs   2000 cGy     9/12/2024 Biopsy    Mass, Superficial perianal mass:  - Squamous cell carcinoma.     Note: The patient's prior lung EBUS sampling shows the tumor to stain for TTF1 and Napsin with absent p40 expression.  The current perianal mass sampling shows diffuse p40 expression with absent TTF1 expression.  This may represent a primary anal/perianal squamous cell carcinoma versus a possible metastatic combined lung tumor (adenocarcinoma and previously unsampled squamous component).  Suggest clinical correlation and appropriate follow-up.         10/1/2024 - 10/18/2024 Radiation    Course: C3    Plan ID Energy Fractions Dose per Fraction (cGy) Dose Correction (cGy) Total Dose Delivered (cGy) Elapsed Days   R Gluteus:1 10X/6X 14 / 15 300 0  4,200 17      Treatment Dates:  10/1/2024 - 10/18/2024.       1/16/2025 - 1/16/2025 Radiation    Course: C4  Plan ID Energy Fractions Dose per Fraction (cGy) Dose Correction (cGy) Total Dose Delivered (cGy) Elapsed Days   SRS L_Front 6X-FFF 1 / 1 2,000 0 2,000 0      Treatment dates:  C4 SRS: 1/16/2025 - 1/16/2025           Current Facility-Administered Medications   Medication Dose Route Frequency Provider Last Rate Last Admin    acetaminophen (TYLENOL) tablet 975 mg  975 mg Oral Q8H KRISS Naila Olivarez MD   975 mg at 04/22/25 0443    amLODIPine (NORVASC) tablet 5 mg  5 mg Oral Daily Naila Olivarez MD   5 mg at 04/22/25 0858    [START ON 4/23/2025] aspirin chewable tablet 81 mg  81 mg Oral Daily Elizabeth Castelan MD        cyanocobalamin (VITAMIN B-12) tablet 1,000 mcg  1,000 mcg Oral Daily Naila Olivarez MD   1,000 mcg at 04/22/25 0858    dexamethasone (DECADRON) tablet 2 mg  2 mg Oral BID With Meals Naila Olivarez MD   2 mg at 04/22/25 0641    gabapentin (NEURONTIN) capsule 100 mg  100 mg Oral BID (AM & Afternoon) Naila Olivarez MD   100 mg at 04/22/25 0858    gabapentin (NEURONTIN) capsule 300 mg  300 mg Oral HS Naila Olivarez MD   300 mg at 04/21/25 2117    HYDROmorphone (DILAUDID) tablet 2 mg  2 mg Oral Q4H PRN Naila Olivarez MD   2 mg at 04/21/25 2116    labetalol (NORMODYNE) injection 10 mg  10 mg Intravenous Q6H PRN Naila Olivarez MD   10 mg at 04/21/25 1824    levETIRAcetam (KEPPRA) tablet 1,000 mg  1,000 mg Oral BID Naila Olivarez MD   1,000 mg at 04/22/25 0857    levothyroxine tablet 50 mcg  50 mcg Oral Early Morning Naila Olivarez MD   50 mcg at 04/22/25 0443    melatonin tablet 3 mg  3 mg Oral HS Marsha Miller MD   3 mg at 04/22/25 0016    ondansetron (ZOFRAN-ODT) dispersible tablet 4 mg  4 mg Oral Q6H PRN Naila Olivarez MD         pantoprazole (PROTONIX) EC tablet 40 mg  40 mg Oral Daily Before Breakfast Naila Olivarez MD   40 mg at 04/22/25 0641    [Held by provider] rivaroxaban (XARELTO) tablet 10 mg  10 mg Oral Daily With Breakfast Naila Olivarez MD        senna (SENOKOT) tablet 8.6 mg  1 tablet Oral Daily Naila Olivarez MD   8.6 mg at 04/22/25 0858    [START ON 4/23/2025] sulfamethoxazole-trimethoprim (BACTRIM DS) 800-160 mg per tablet 1 tablet  1 tablet Oral Once per day on Monday Wednesday Friday Naila Olivarez MD           Objective :  Temp:  [97.6 °F (36.4 °C)-98.3 °F (36.8 °C)] 98 °F (36.7 °C)  HR:  [59-88] 59  BP: (134-186)/(73-89) 152/73  Resp:  [14-18] 16  SpO2:  [90 %-97 %] 95 %  O2 Device: None (Room air)    Physical Exam  Constitutional:       General: She is not in acute distress.  HENT:      Head: Normocephalic and atraumatic.      Mouth/Throat:      Pharynx: No oropharyngeal exudate.   Eyes:      General: No scleral icterus.  Cardiovascular:      Rate and Rhythm: Normal rate and regular rhythm.   Pulmonary:      Effort: Pulmonary effort is normal. No respiratory distress.   Abdominal:      Palpations: Abdomen is soft.   Musculoskeletal:      Cervical back: Normal range of motion.   Skin:     General: Skin is warm and dry.   Neurological:      General: No focal deficit present.      Mental Status: She is alert.      Motor: Weakness present.           Lab Results: I have reviewed the following results:  Lab Results   Component Value Date    K 3.9 04/22/2025     04/22/2025    CO2 23 04/22/2025    BUN 17 04/22/2025    CREATININE 0.81 04/22/2025    GLUF 97 03/25/2025    CALCIUM 8.9 04/22/2025    CORRECTEDCA 9.3 04/07/2025    AST 10 (L) 04/07/2025    ALT 6 (L) 04/07/2025    ALKPHOS 44 04/07/2025    EGFR 71 04/22/2025     Lab Results   Component Value Date    WBC 10.10 04/22/2025    HGB 8.6 (L) 04/22/2025    HCT 28.9 (L) 04/22/2025    MCV 94 04/22/2025      04/22/2025     Lab Results   Component Value Date    NEUTROABS 1.50 (L) 02/19/2025       Imaging Results Review: I reviewed radiology reports from this admission including: CT head and MRI brain.      Administrative Statements   I have spent a total time of 60 minutes in caring for this patient on the day of the visit/encounter including Diagnostic results, Prognosis, Risks and benefits of tx options, Instructions for management, Impressions, Documenting in the medical record, Reviewing/placing orders in the medical record (including tests, medications, and/or procedures), and Obtaining or reviewing history  .

## 2025-04-22 NOTE — ASSESSMENT & PLAN NOTE
On chemotherapy, last chemotherapy was 3 weeks back-carboplatin and paclitaxel  Follows up with Dr. Castro and radiation oncology  S/p radiation therapy from July to September, was on Keytruda which was stopped secondary to pneumonitis, on tapering dose of Decadron  Has had stereotactic radiation to brain twice-last stereotactic radiation in January 2025  Currently on 2 mg Decadron twice daily

## 2025-04-22 NOTE — PROGRESS NOTES
Progress Note - Hospitalist   Name: Ayla Nye 74 y.o. female I MRN: [id number]  Unit/Bed#: -01 I Date of Admission: 4/21/2025   Date of Service: 4/22/2025 I Hospital Day: 1    Assessment & Plan  Worsening weakness of left upper extremity  History of stage IV adenocarcinoma on chemotherapy with mets to brain status post radiation  Baseline left upper and lower extremity weakness  Presents with worsening left upper extremity weakness.  Denies other neurological abnormalities  On Decadron 2 mg twice daily at home  CTA head and neck showed vasogenic edema involving the left parietal and left occipital lobe similar compared to the recent brain MRI.  Small 5 mm hyperdense focus noted within the left parietal occipital lobe which corresponds to susceptibility and likely represents a small focus of hemorrhage within the dominant lesion in this region on prior brain MRI.  Re-demonstrated central necrotic mass involving paramediastinal anterior right upper lobe with surrounding scarring/fibrosis  Admission initial physical examination-1 out of 5 strength in left upper extremity and 3 out of 5 strength in left lower extremity, no sensation abnormalities  MRI brain showing stable appearance of treated left brain metastases without any new metastases.  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 Castro and radiation oncology  S/p radiation therapy from July to September, was on Keytruda which was stopped secondary to  pneumonitis, on tapering dose of Decadron  Has had stereotactic radiation to brain twice-last stereotactic radiation in January 2025  Currently on 2 mg Decadron twice daily  Anemia  History of anemia  And history of vitamin B12 deficiency and essential thrombocytosis  Hemoglobin baseline 8.7-9 .5  Continue to monitor  Hypertension  History of hypertension  On amlodipine 5 mg daily  Blood Pressure goal-normotensive  As needed IV labetalol ordered  History of pulmonary embolism  Patient has had history of pulmonary embolism  On Xarelto 10 mg daily  Xarelto currently held, aspirin started for prior concerns of hemorrhage.  MRI showing subacute infarcts  Hypothyroidism  On levothyroxine 50 mcg daily    VTE Pharmacologic Prophylaxis: VTE Score: 7 Xarelto currently held, SCDs    Mobility:   Basic Mobility Inpatient Raw Score: 24  JH-HLM Goal: 8: Walk 250 feet or more  JH-HLM Achieved: 8: Walk 250 feet ot more  JH-HLM Goal achieved. Continue to encourage appropriate mobility.    Education and Discussions with Family / Patient: Patient declined call to . Patient said she called her daughter already    Current Length of Stay: 1 day(s)  Current Patient Status: Inpatient   Certification Statement: The patient will continue to require additional inpatient hospital stay due to new subacute strokes  Discharge Plan: Anticipate discharge in 24-48 hrs to home.    Code Status: Level 1 - Full Code    Subjective   Patient denies any worsening weakness. She denies headache, numbness, visual changes, speech difficulty. No active bleeding noted. Her last nose bleed was a few weeks ago. She is tolerating PO intake, had mild gas earlier.     Objective :  Temp:  [97.6 °F (36.4 °C)-98.3 °F (36.8 °C)] 98 °F (36.7 °C)  HR:  [59-88] 59  BP: (134-186)/(73-89) 152/73  Resp:  [14-18] 16  SpO2:  [90 %-97 %] 95 %  O2 Device: None (Room air)    There is no height or weight on file to calculate BMI.     Input and Output Summary (last 24  hours):     Intake/Output Summary (Last 24 hours) at 4/22/2025 1436  Last data filed at 4/22/2025 1357  Gross per 24 hour   Intake 591 ml   Output --   Net 591 ml       Physical Exam  Vitals reviewed.   Constitutional:       Appearance: She is not diaphoretic.   HENT:      Head: Normocephalic and atraumatic.      Mouth/Throat:      Mouth: Mucous membranes are moist.   Eyes:      General: Visual field deficit present.      Extraocular Movements: Extraocular movements intact.      Conjunctiva/sclera: Conjunctivae normal.      Pupils: Pupils are equal, round, and reactive to light.   Cardiovascular:      Rate and Rhythm: Normal rate and regular rhythm.   Pulmonary:      Effort: Pulmonary effort is normal. No respiratory distress.      Breath sounds: Normal breath sounds.   Abdominal:      Palpations: Abdomen is soft.      Tenderness: There is no abdominal tenderness. There is no guarding or rebound.   Musculoskeletal:      Right lower leg: No edema.      Left lower leg: No edema.   Skin:     General: Skin is warm and dry.   Neurological:      Mental Status: She is alert.      GCS: GCS eye subscore is 4. GCS verbal subscore is 5. GCS motor subscore is 6.      Cranial Nerves: No dysarthria or facial asymmetry.      Sensory: Sensation is intact.      Motor: Weakness present. No tremor.      Coordination: Coordination is intact. Finger-Nose-Finger Test normal.      Comments: 4/5 strength in left elbow flexion and extension  4/5 strength in left hip flexion   5/5 strength finger flexion and thumb flexion  5/5 strength in RUE and RLE     Psychiatric:         Mood and Affect: Mood normal.         Behavior: Behavior normal.         Thought Content: Thought content normal.             Lab Results: I have reviewed the following results:   Results from last 7 days   Lab Units 04/22/25  0454   WBC Thousand/uL 10.10   HEMOGLOBIN g/dL 8.6*   HEMATOCRIT % 28.9*   PLATELETS Thousands/uL 387   LYMPHO PCT % 4*   MONO PCT % 5   EOS PCT %  0     Results from last 7 days   Lab Units 04/22/25  0454   SODIUM mmol/L 139   POTASSIUM mmol/L 3.9   CHLORIDE mmol/L 106   CO2 mmol/L 23   BUN mg/dL 17   CREATININE mg/dL 0.81   ANION GAP mmol/L 10   CALCIUM mg/dL 8.9   GLUCOSE RANDOM mg/dL 88     Results from last 7 days   Lab Units 04/21/25  1026   INR  1.29*                   Recent Cultures (last 7 days):               Last 24 Hours Medication List:     Current Facility-Administered Medications:     acetaminophen (TYLENOL) tablet 975 mg, Q8H KRISS    aluminum-magnesium hydroxide-simethicone (MAALOX) oral suspension 30 mL, Q4H PRN    amLODIPine (NORVASC) tablet 5 mg, Daily    [START ON 4/23/2025] aspirin chewable tablet 81 mg, Daily    cyanocobalamin (VITAMIN B-12) tablet 1,000 mcg, Daily    dexamethasone (DECADRON) tablet 2 mg, BID With Meals    gabapentin (NEURONTIN) capsule 100 mg, BID (AM & Afternoon)    gabapentin (NEURONTIN) capsule 300 mg, HS    HYDROmorphone (DILAUDID) tablet 2 mg, Q4H PRN    labetalol (NORMODYNE) injection 10 mg, Q6H PRN    levETIRAcetam (KEPPRA) tablet 1,000 mg, BID    levothyroxine tablet 50 mcg, Early Morning    melatonin tablet 3 mg, HS    ondansetron (ZOFRAN-ODT) dispersible tablet 4 mg, Q6H PRN    pantoprazole (PROTONIX) EC tablet 40 mg, Daily Before Breakfast    [Held by provider] rivaroxaban (XARELTO) tablet 10 mg, Daily With Breakfast    senna (SENOKOT) tablet 8.6 mg, Daily    [START ON 4/23/2025] sulfamethoxazole-trimethoprim (BACTRIM DS) 800-160 mg per tablet 1 tablet, Once per day on Monday Wednesday Friday    Administrative Statements   Today, Patient Was Seen By: Jaci Chu MD      **Please Note: This note may have been constructed using a voice recognition system.**

## 2025-04-22 NOTE — ASSESSMENT & PLAN NOTE
Patient has had history of pulmonary embolism  On Xarelto 10 mg daily  Xarelto currently held, aspirin started for prior concerns of hemorrhage.  MRI showing subacute infarcts

## 2025-04-22 NOTE — ASSESSMENT & PLAN NOTE
As above  Neurology following, appreciate recommendations  Recommending holding Xarelto.  Aspirin 81 mg started.    PLAN:  Patient has metastatic stage IV adenocarcinoma of the lung.  She has progressed through first-line therapy and has overall poor prognosis.  Admitted now with concern for hemorrhagic stroke.  She is most appropriate for hospice.  However, is not ready for hospice care at this time.  She is concerned about her next scheduled chemotherapy which is due later this week.  I reviewed with patient we will place chemotherapy scheduled for this week on hold while she is hospitalized.  Will arrange follow-up with Dr. Castro on discharge to reevaluate appropriateness of resuming systemic therapy.  Risk versus benefit of Xarelto discussed.  Patient does have high risk for VTE given underlying metastatic malignancy.  However with concern for hemorrhagic stroke risk at this time outweighs benefit.  Agree with neurology's recommendations for baby aspirin.

## 2025-04-22 NOTE — ASSESSMENT & PLAN NOTE
Presents with left upper and lower extremities weakness for the last week, dropping small objects, 1 reported fall  On physical exam strength is 3/5 on the left side in upper and lower extremities, weakened left hand   No facial asymmetry, no dysphagia, normal coordination tests.   Reports nose bleeds for several minutes since she was started on Xarelto  Pt is taking Decadron 2 mg BID at home.   S/p Chemo, s/p radiation, s/p immunotherapy (see adenocarcinoma problem)  S/p stereotactic brain radiation in Aug 93174 and Jan 2025, follow up natan MRI from 3/18/25 suggested improvement of vasogenic edema  CTA head and neck from 4/21/25 : Vasogenic edema again demonstrated involving the left parietal and left occipital lobes, similar when compared to recent brain MRI. Small 5 mm hyperdense focus noted within the left parietal occipital lobe, which likely represents a small focus of hemorrhage within the dominant lesion in this region on prior brain MRI.   MRI brain : Stable appearance of the treated left brain metastases. No new metastases. Small foci of right frontal diffusion signal most compatible with subacute infarcts.     DDx: subacute infarct vs new small foci of mts vs/and mts foci with bleed    Plan:  Frequent Neurologic evaluation  Continue Decadron to 2 mg BID  C/w Keppra 1000 mg BID  Hold Xarelto  Start Asa 81 mg. If later on pt is restarted on Xarelto than asa could be discontinued  Obtain ECHO to rule out clots  Follow oncology recommendations  Prognosis remains guarded

## 2025-04-22 NOTE — ASSESSMENT & PLAN NOTE
Following with Dr. Castro, last visit on 4/14/2025  Last chemo with carboplatin and paclitaxel on 1/23/2025  S/p radiation in July to Sep 2024, s/p Keytruda (dc'd due to pneumonitis)  S/p Stereotactic radiation in Aug 2024 and Jan 2025   Plan:  Follow Hem onc consult recs

## 2025-04-22 NOTE — CONSULTS
Consultation - Neurology   Name: Ayla Nye 74 y.o. female I MRN: [id number]  Unit/Bed#: -01 I Date of Admission: 4/21/2025   Date of Service: 4/22/2025 I Hospital Day: 1   Inpatient consult to Neurology  Consult performed by: Elizabeth Castelan MD  Consult ordered by: Naila Olivarez MD        Physician Requesting Evaluation: Perla Law MD   Reason for Evaluation / Principal Problem: left sided weakness    Assessment & Plan  Worsening weakness of left upper extremity  Presents with left upper and lower extremities weakness for the last week, dropping small objects, 1 reported fall  On physical exam strength is 3/5 on the left side in upper and lower extremities, weakened left hand   No facial asymmetry, no dysphagia, normal coordination tests.   Reports nose bleeds for several minutes since she was started on Xarelto  Pt is taking Decadron 2 mg BID at home.   S/p Chemo, s/p radiation, s/p immunotherapy (see adenocarcinoma problem)  S/p stereotactic brain radiation in Aug 63633 and Jan 2025, follow up natan MRI from 3/18/25 suggested improvement of vasogenic edema  CTA head and neck from 4/21/25 : Vasogenic edema again demonstrated involving the left parietal and left occipital lobes, similar when compared to recent brain MRI. Small 5 mm hyperdense focus noted within the left parietal occipital lobe, which likely represents a small focus of hemorrhage within the dominant lesion in this region on prior brain MRI.   MRI brain : Stable appearance of the treated left brain metastases. No new metastases. Small foci of right frontal diffusion signal most compatible with subacute infarcts.     DDx: subacute infarct vs new small foci of mts vs/and mts foci with bleed    Plan:  Frequent Neurologic evaluation  Continue Decadron to 2 mg BID  C/w Keppra 1000 mg BID  Hold Xarelto  Start Asa 81 mg. 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 Castro, last visit on 4/14/2025  Last chemo with carboplatin and paclitaxel on 1/23/2025  S/p radiation in July to Sep 2024, s/p Keytruda (dc'd due to pneumonitis)  S/p Stereotactic radiation in Aug 2024 and Jan 2025   Plan:  Follow Hem onc consult recs  Hypertension  Maintain normotension, management as per primary team  Hypothyroidism  C/w levothyroxine 50 mcg daily  Anemia  Hgb is stable, management as per primary team  History of pulmonary embolism  On Xarelto  Hold in settings of possible brain hemorrhage      Recommendations for outpatient neurological follow up have yet to be determined.    History of Present Illness   Ayla Nye is a 74 y.o. female with pmh of stage IV Lung Adenocarcinoma with brain metastasis, PE on Xarelto who presents with left sided weakness. Pt states that she noticed weakness in her left arm and left leg for the last week. She mentioned that she was dropping small objects and also that she had difficulty walking. She described as a need to drug her leg. She reports one fall around Easter however states that her \"legs just got twisted.\" Pt denies hurting herself. Pt admits that she is feeling light headed when getting up to quickly which is not new and she is usually resolved if she sits for a bit. Pt is pleasant on examination. Admits previous right eye peripheral field reduction after stereotactic radiation. She confirms taking all her medications. She admits drinking and eating appropriately. Additionally, pt states that she is having daily nose bleeds with a lot of clots which lasts for several minutes since she was started on xarelto.    Review of Systems   Constitutional:  Negative for chills and fever.   HENT:  Negative for ear pain and sore throat.    Eyes:  Negative for pain and visual disturbance.   Respiratory:  " Negative for cough and shortness of breath.    Cardiovascular:  Negative for chest pain and palpitations.   Gastrointestinal:  Negative for abdominal pain and vomiting.   Genitourinary:  Negative for dysuria and hematuria.   Musculoskeletal:  Negative for arthralgias and back pain.   Skin:  Negative for color change and rash.   Neurological:  Positive for weakness. Negative for seizures and syncope.   All other systems reviewed and are negative.     negative, migraine headaches, syncope, seizures, paralysis, numbness or tingling of hands, numbness or tingling of feet, involuntary movements, tremor, paresis, spasticity, developmental delay, cerebral palsy, microcephaly, posturing, generalized seizures, focal seizures, fainting  Medical History Review: I have reviewed the patient's PMH, PSH, Social History, Family History, Meds, and Allergies     Objective :  Temp:  [97.6 °F (36.4 °C)-98.7 °F (37.1 °C)] 97.6 °F (36.4 °C)  HR:  [67-90] 68  BP: (134-186)/(73-89) 144/78  Resp:  [14-18] 16  SpO2:  [90 %-97 %] 95 %  O2 Device: None (Room air)    Physical Exam  Vitals and nursing note reviewed.   Constitutional:       General: She is not in acute distress.     Appearance: She is well-developed.   HENT:      Head: Normocephalic and atraumatic.   Eyes:      Conjunctiva/sclera: Conjunctivae normal.   Cardiovascular:      Rate and Rhythm: Normal rate and regular rhythm.      Heart sounds: No murmur heard.  Pulmonary:      Effort: Pulmonary effort is normal. No respiratory distress.      Breath sounds: Normal breath sounds.   Abdominal:      Palpations: Abdomen is soft.      Tenderness: There is no abdominal tenderness.   Musculoskeletal:         General: No swelling.      Cervical back: Neck supple.   Skin:     General: Skin is warm and dry.      Capillary Refill: Capillary refill takes less than 2 seconds.   Neurological:      Mental Status: She is alert and oriented to person, place, and time.      Motor: Weakness present.       Gait: Gait abnormal.      Comments: AOx3  No face asymmetry  No EOM abn  No discoordination  Right peripheral vision field is diminished after SRT, not new  Left upper extremity 3/5, left hand  is weakened  Left lower extremity 3/5  Sensitivity preservation   Psychiatric:         Mood and Affect: Mood normal.     Neurological Exam  Mental Status  Alert. Oriented to person, place, and time.    Gait   Abnormal gait.  AOx3  No face asymmetry  No EOM abn  No discoordination  Right peripheral vision field is diminished after SRT, not new  Left upper extremity 3/5, left hand  is weakened  Left lower extremity 3/5  Sensitivity preservation.        Lab Results: I have reviewed the following results:I have personally reviewed pertinent reports.    Recent Labs     04/22/25  0454   WBC 10.10   HGB 8.6*   HCT 28.9*      SODIUM 139   K 3.9      CO2 23   BUN 17   CREATININE 0.81   GLUC 88     Imaging Results Review: I personally reviewed the following image studies in PACS and associated radiology reports: CTA head and neck and MRI brain. My interpretation of the radiology images/reports is: consistent with radiology report.  Other Study Results Review: EKG was reviewed.     VTE Prophylaxis: Sequential compression device (Venodyne)  and Reason for no pharmacologic prophylaxis brain bleed.

## 2025-04-22 NOTE — ASSESSMENT & PLAN NOTE
On Xarelto for history of PE  Patient has high risk for recurrent VTE in the context of underlying metastatic lung cancer  Xarelto currently on hold given concern for possible hemorrhagic stroke

## 2025-04-22 NOTE — ASSESSMENT & PLAN NOTE
History of hypertension  On amlodipine 5 mg daily  Blood Pressure goal-normotensive  As needed IV labetalol ordered

## 2025-04-23 ENCOUNTER — APPOINTMENT (INPATIENT)
Dept: RADIOLOGY | Facility: HOSPITAL | Age: 75
DRG: 064 | End: 2025-04-23
Payer: MEDICARE

## 2025-04-23 ENCOUNTER — TELEPHONE (OUTPATIENT)
Age: 75
End: 2025-04-23

## 2025-04-23 ENCOUNTER — APPOINTMENT (INPATIENT)
Dept: NON INVASIVE DIAGNOSTICS | Facility: HOSPITAL | Age: 75
DRG: 064 | End: 2025-04-23
Payer: MEDICARE

## 2025-04-23 PROBLEM — I47.29 NSVT (NONSUSTAINED VENTRICULAR TACHYCARDIA) (HCC): Status: ACTIVE | Noted: 2025-04-23

## 2025-04-23 LAB
ANION GAP SERPL CALCULATED.3IONS-SCNC: 11 MMOL/L (ref 4–13)
AORTIC ROOT: 3.4 CM
ASCENDING AORTA: 3.1 CM
BSA FOR ECHO PROCEDURE: 1.54 M2
BUN SERPL-MCNC: 22 MG/DL (ref 5–25)
CALCIUM SERPL-MCNC: 9.1 MG/DL (ref 8.4–10.2)
CHLORIDE SERPL-SCNC: 105 MMOL/L (ref 96–108)
CHOLEST SERPL-MCNC: 219 MG/DL (ref ?–200)
CO2 SERPL-SCNC: 24 MMOL/L (ref 21–32)
CREAT SERPL-MCNC: 0.86 MG/DL (ref 0.6–1.3)
E WAVE DECELERATION TIME: 331 MS
E/A RATIO: 0.59
ERYTHROCYTE [DISTWIDTH] IN BLOOD BY AUTOMATED COUNT: 17.3 % (ref 11.6–15.1)
EST. AVERAGE GLUCOSE BLD GHB EST-MCNC: 114 MG/DL
FRACTIONAL SHORTENING: 30 (ref 28–44)
GFR SERPL CREATININE-BSD FRML MDRD: 66 ML/MIN/1.73SQ M
GLUCOSE SERPL-MCNC: 93 MG/DL (ref 65–140)
HBA1C MFR BLD: 5.6 %
HCT VFR BLD AUTO: 30.6 % (ref 34.8–46.1)
HDLC SERPL-MCNC: 48 MG/DL
HGB BLD-MCNC: 9.3 G/DL (ref 11.5–15.4)
INTERVENTRICULAR SEPTUM IN DIASTOLE (PARASTERNAL SHORT AXIS VIEW): 1 CM
INTERVENTRICULAR SEPTUM: 1 CM (ref 0.6–1.1)
LAAS-AP2: 15.2 CM2
LAAS-AP4: 11.6 CM2
LDLC SERPL CALC-MCNC: 122 MG/DL (ref 0–100)
LEFT ATRIUM AREA SYSTOLE SINGLE PLANE A4C: 11.9 CM2
LEFT ATRIUM SIZE: 2.8 CM
LEFT ATRIUM VOLUME (MOD BIPLANE): 35 ML
LEFT ATRIUM VOLUME INDEX (MOD BIPLANE): 22.7 ML/M2
LEFT INTERNAL DIMENSION IN SYSTOLE: 2.3 CM (ref 2.1–4)
LEFT VENTRICULAR INTERNAL DIMENSION IN DIASTOLE: 3.3 CM (ref 3.5–6)
LEFT VENTRICULAR POSTERIOR WALL IN END DIASTOLE: 1 CM
LEFT VENTRICULAR STROKE VOLUME: 26 ML
LV EF US.2D.A4C+ESTIMATED: 64 %
LVSV (TEICH): 26 ML
MAGNESIUM SERPL-MCNC: 2.1 MG/DL (ref 1.9–2.7)
MCH RBC QN AUTO: 28.4 PG (ref 26.8–34.3)
MCHC RBC AUTO-ENTMCNC: 30.4 G/DL (ref 31.4–37.4)
MCV RBC AUTO: 93 FL (ref 82–98)
MV E'TISSUE VEL-SEP: 9 CM/S
MV PEAK A VEL: 1.09 M/S
MV PEAK E VEL: 64 CM/S
MV STENOSIS PRESSURE HALF TIME: 96 MS
MV VALVE AREA P 1/2 METHOD: 2.29
PLATELET # BLD AUTO: 400 THOUSANDS/UL (ref 149–390)
PMV BLD AUTO: 9 FL (ref 8.9–12.7)
POTASSIUM SERPL-SCNC: 4.2 MMOL/L (ref 3.5–5.3)
RA PRESSURE ESTIMATED: 5 MMHG
RBC # BLD AUTO: 3.28 MILLION/UL (ref 3.81–5.12)
RIGHT ATRIUM AREA SYSTOLE A4C: 10.4 CM2
RIGHT VENTRICLE ID DIMENSION: 3.1 CM
RV PSP: 32 MMHG
SL CV LEFT ATRIUM LENGTH A2C: 4.8 CM
SL CV LV EF: 75
SL CV PED ECHO LEFT VENTRICLE DIASTOLIC VOLUME (MOD BIPLANE) 2D: 44 ML
SL CV PED ECHO LEFT VENTRICLE SYSTOLIC VOLUME (MOD BIPLANE) 2D: 18 ML
SODIUM SERPL-SCNC: 140 MMOL/L (ref 135–147)
TR MAX PG: 27 MMHG
TR PEAK VELOCITY: 2.6 M/S
TRICUSPID ANNULAR PLANE SYSTOLIC EXCURSION: 1.8 CM
TRICUSPID VALVE PEAK REGURGITATION VELOCITY: 2.62 M/S
TRIGL SERPL-MCNC: 243 MG/DL (ref ?–150)
WBC # BLD AUTO: 9.59 THOUSAND/UL (ref 4.31–10.16)

## 2025-04-23 PROCEDURE — 99232 SBSQ HOSP IP/OBS MODERATE 35: CPT | Performed by: STUDENT IN AN ORGANIZED HEALTH CARE EDUCATION/TRAINING PROGRAM

## 2025-04-23 PROCEDURE — 93306 TTE W/DOPPLER COMPLETE: CPT

## 2025-04-23 PROCEDURE — 80048 BASIC METABOLIC PNL TOTAL CA: CPT

## 2025-04-23 PROCEDURE — 80061 LIPID PANEL: CPT

## 2025-04-23 PROCEDURE — 83036 HEMOGLOBIN GLYCOSYLATED A1C: CPT

## 2025-04-23 PROCEDURE — 85027 COMPLETE CBC AUTOMATED: CPT

## 2025-04-23 PROCEDURE — 83735 ASSAY OF MAGNESIUM: CPT

## 2025-04-23 PROCEDURE — 99232 SBSQ HOSP IP/OBS MODERATE 35: CPT | Performed by: INTERNAL MEDICINE

## 2025-04-23 PROCEDURE — 93306 TTE W/DOPPLER COMPLETE: CPT | Performed by: INTERNAL MEDICINE

## 2025-04-23 PROCEDURE — 72100 X-RAY EXAM L-S SPINE 2/3 VWS: CPT

## 2025-04-23 RX ORDER — ATORVASTATIN CALCIUM 40 MG/1
40 TABLET, FILM COATED ORAL
Status: DISCONTINUED | OUTPATIENT
Start: 2025-04-23 | End: 2025-04-25 | Stop reason: HOSPADM

## 2025-04-23 RX ORDER — ATORVASTATIN CALCIUM 40 MG/1
40 TABLET, FILM COATED ORAL
Qty: 30 TABLET | Refills: 0 | Status: CANCELLED | OUTPATIENT
Start: 2025-04-23

## 2025-04-23 RX ORDER — ASPIRIN 81 MG/1
81 TABLET, CHEWABLE ORAL DAILY
Qty: 30 TABLET | Refills: 0 | Status: CANCELLED | OUTPATIENT
Start: 2025-04-23 | End: 2025-05-23

## 2025-04-23 RX ORDER — MAGNESIUM HYDROXIDE/ALUMINUM HYDROXICE/SIMETHICONE 120; 1200; 1200 MG/30ML; MG/30ML; MG/30ML
30 SUSPENSION ORAL EVERY 4 HOURS PRN
Qty: 355 ML | Refills: 0 | Status: CANCELLED | OUTPATIENT
Start: 2025-04-23 | End: 2025-05-03

## 2025-04-23 RX ORDER — ENOXAPARIN SODIUM 100 MG/ML
40 INJECTION SUBCUTANEOUS
Status: CANCELLED | OUTPATIENT
Start: 2025-04-23

## 2025-04-23 RX ADMIN — AMLODIPINE BESYLATE 5 MG: 5 TABLET ORAL at 08:11

## 2025-04-23 RX ADMIN — LEVETIRACETAM 1000 MG: 500 TABLET, FILM COATED ORAL at 20:35

## 2025-04-23 RX ADMIN — ATORVASTATIN CALCIUM 40 MG: 40 TABLET, FILM COATED ORAL at 17:50

## 2025-04-23 RX ADMIN — HYDROMORPHONE HYDROCHLORIDE 2 MG: 2 TABLET ORAL at 17:57

## 2025-04-23 RX ADMIN — DEXAMETHASONE 2 MG: 2 TABLET ORAL at 07:07

## 2025-04-23 RX ADMIN — Medication 1000 MCG: at 08:12

## 2025-04-23 RX ADMIN — GABAPENTIN 100 MG: 100 CAPSULE ORAL at 08:12

## 2025-04-23 RX ADMIN — GABAPENTIN 100 MG: 100 CAPSULE ORAL at 14:13

## 2025-04-23 RX ADMIN — LEVETIRACETAM 1000 MG: 500 TABLET, FILM COATED ORAL at 08:12

## 2025-04-23 RX ADMIN — ACETAMINOPHEN 975 MG: 325 TABLET, FILM COATED ORAL at 20:36

## 2025-04-23 RX ADMIN — PANTOPRAZOLE SODIUM 40 MG: 40 TABLET, DELAYED RELEASE ORAL at 07:07

## 2025-04-23 RX ADMIN — DEXAMETHASONE 2 MG: 2 TABLET ORAL at 17:50

## 2025-04-23 RX ADMIN — SENNOSIDES 8.6 MG: 8.6 TABLET, FILM COATED ORAL at 08:11

## 2025-04-23 RX ADMIN — Medication 3 MG: at 20:36

## 2025-04-23 RX ADMIN — GABAPENTIN 300 MG: 300 CAPSULE ORAL at 20:35

## 2025-04-23 RX ADMIN — ASPIRIN 81 MG CHEWABLE TABLET 81 MG: 81 TABLET CHEWABLE at 08:11

## 2025-04-23 RX ADMIN — SULFAMETHOXAZOLE AND TRIMETHOPRIM 1 TABLET: 800; 160 TABLET ORAL at 08:12

## 2025-04-23 RX ADMIN — ACETAMINOPHEN 975 MG: 325 TABLET, FILM COATED ORAL at 05:14

## 2025-04-23 RX ADMIN — ACETAMINOPHEN 975 MG: 325 TABLET, FILM COATED ORAL at 14:13

## 2025-04-23 RX ADMIN — LEVOTHYROXINE SODIUM 50 MCG: 0.05 TABLET ORAL at 05:14

## 2025-04-23 NOTE — ASSESSMENT & PLAN NOTE
Episode of nonsustained V. tach 4/23 morning.  Patient asymptomatic at this time.  Potassium and magnesium within normal range.  Continue to monitor on telemetry, follow-up with echocardiogram

## 2025-04-23 NOTE — ASSESSMENT & PLAN NOTE
Patient has had history of pulmonary embolism  On Xarelto 10 mg daily prior to admission  Xarelto currently held, aspirin started for prior concerns of hemorrhage.  MRI showing subacute infarcts.  Patient is at high risk of PE, risks versus benefits discussion.  Patient and daughter agreeable to continuing aspirin instead of Xarelto

## 2025-04-23 NOTE — ASSESSMENT & PLAN NOTE
Presents with left upper and lower extremities weakness for the last week, dropping small objects, 1 reported fall  On physical exam strength is 3/5 on the left side in upper and lower extremities, weakened left hand   No facial asymmetry, no dysphagia, normal coordination tests.   Reports daily nose bleeds for several minutes since she was started on Xarelto  Pt is taking Decadron 2 mg BID at home and Keppra 1000 mg BID  S/p Chemo, s/p radiation, s/p immunotherapy (see adenocarcinoma problem)  S/p stereotactic brain radiation in Aug 44312 and Jan 2025, follow up natan MRI from 3/18/25 suggested improvement of vasogenic edema  CTA head and neck from 4/21/25 : Vasogenic edema again demonstrated involving the left parietal and left occipital lobes, similar when compared to recent brain MRI. Small 5 mm hyperdense focus noted within the left parietal occipital lobe, which likely represents a small focus of hemorrhage within the dominant lesion in this region on prior brain MRI.   MRI brain : Stable appearance of the treated left brain metastases. No new metastases. Small foci of right frontal diffusion signal most compatible with subacute infarcts.     Today pt is feeling well, LLE strength slightly improved since yesterday    Plan:    Continue Decadron to 2 mg BID  C/w Keppra 1000 mg BID  Continue holding Xarelto on discharge  C/w Asa 81 mg on Discharge. Statin benefits is questionable in settings of guarded prognosis. We'll leave to discretion of the primary team.  Follow  up with neurology follow up in 2-4 weeks in outpatient settings with repeat MRI in about a month. AC restart should be reconsidered in outpatient setting. If later on pt is restarted on Xarelto than asa could be discontinued.  ECHO report is pending  Follow up with oncology   Prognosis remains guarded  Pt is cleared for discharge from neurologic stand point with neurology follow up.

## 2025-04-23 NOTE — OCCUPATIONAL THERAPY NOTE
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 2023    Due to have durgery June 2024    Essential thrombocytosis (HCC)     controlled with medication    History of transfusion     Hyperlipidemia 2015    Hypertension 2011    Mass in chest     Nodular goiter     Osteoporosis     Peripheral neuropathy     Pneumonia Oct 16, 2023    Also  Feb 2024    Psoriasis     SIRS (systemic inflammatory response syndrome) (HCC) 06/22/2024     Past Surgical History  Past Surgical History:   Procedure Laterality Date    APPENDECTOMY  1954    BREAST CYST EXCISION Right     COLONOSCOPY  10/31/2018    DXA PROCEDURE (HISTORICAL)  04/21/2017    IR BIOPSY OTHER  09/12/2024    IR LUMBAR PUNCTURE  09/12/2024    MAMMO (HISTORICAL)      8-24-18    MAMMO NEEDLE LOCALIZATION RIGHT (ALL INC) Right 07/13/2009    SKIN LESION EXCISION      bridge of nose        04/23/25 1444   Note Type   Note type Cancelled Session  (Wed 4/23/25)   Cancel Reasons Other  (pend imaging (x-ray) lumbar spine s/p fall in bathroom w/ no head strike)   Additional Comments OT orders received on stroke pathway. Will await imaging to complete eval as appropriate. Spoke w/ RNCheri.         Larissa Tiwari, OTR/L  [id number]  [id number]

## 2025-04-23 NOTE — OCCUPATIONAL THERAPY NOTE
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 2023    Due to have durgery June 2024    Essential thrombocytosis (HCC)     controlled with medication    History of transfusion     Hyperlipidemia 2015    Hypertension 2011    Mass in chest     Nodular goiter     Osteoporosis     Peripheral neuropathy     Pneumonia Oct 16, 2023    Also  Feb 2024    Psoriasis     SIRS (systemic inflammatory response syndrome) (HCC) 06/22/2024     Past Surgical History  Past Surgical History:   Procedure Laterality Date    APPENDECTOMY  1954    BREAST CYST EXCISION Right     COLONOSCOPY  10/31/2018    DXA PROCEDURE (HISTORICAL)  04/21/2017    IR BIOPSY OTHER  09/12/2024    IR LUMBAR PUNCTURE  09/12/2024    MAMMO (HISTORICAL)      8-24-18    MAMMO NEEDLE LOCALIZATION RIGHT (ALL INC) Right 07/13/2009    SKIN LESION EXCISION      bridge of nose        04/23/25 1615   Note Type   Note type Cancelled Session  (Wed 4/23/25; contact made w/ pt following imaging (negative for fracture, acute injury))   Cancel Reasons Refusal   Additional Comments Chart review completed and contact made w/ pt. Pt had her brother and sister in law visiting and declined participate despite max encouragement. RN aware. Will continue to follow as appropriate / schedule allows in AM.         Larissa Tiwari OTR/GAMAL  [id number]  [id number]

## 2025-04-23 NOTE — PLAN OF CARE
Problem: Potential for Falls  Goal: Patient will remain free of falls  Description: INTERVENTIONS:- Educate patient/family on patient safety including physical limitations- Instruct patient to call for assistance with activity - Consult OT/PT to assist with strengthening/mobility - Keep Call bell within reach- Keep bed low and locked with side rails adjusted as appropriate- Keep care items and personal belongings within reach- Initiate and maintain comfort rounds- Make Fall Risk Sign visible to staff- Offer Toileting every  Hours, in advance of need- Initiate/Maintain alarm- Obtain necessary fall risk management equipment: - Apply yellow socks and bracelet for high fall risk patients- Consider moving patient to room near nurses station  INTERVENTIONS:- Educate patient/family on patient safety including physical limitations- Instruct patient to call for assistance with activity - Consult OT/PT to assist with strengthening/mobility - Keep Call bell within reach- Keep bed low and locked with side rails adjusted as appropriate- Keep care items and personal belongings within reach- Initiate and maintain comfort rounds- Make Fall Risk Sign visible to staff- Offer Toileting every  Hours, in advance of need- Initiate/Maintain alarm- Obtain necessary fall risk management equipment: - Apply yellow socks and bracelet for high fall risk patients- Consider moving patient to room near nurses station  Outcome: Progressing     Problem: PAIN - ADULT  Goal: Verbalizes/displays adequate comfort level or baseline comfort level  Description: Interventions:- Encourage patient to monitor pain and request assistance- Assess pain using appropriate pain scale- Administer analgesics based on type and severity of pain and evaluate response- Implement non-pharmacological measures as appropriate and evaluate response- Consider cultural and social influences on pain and pain management- Notify physician/advanced practitioner if interventions  unsuccessful or patient reports new pain  Outcome: Progressing     Problem: INFECTION - ADULT  Goal: Absence or prevention of progression during hospitalization  Description: INTERVENTIONS:- Assess and monitor for signs and symptoms of infection- Monitor lab/diagnostic results- Monitor all insertion sites, i.e. indwelling lines, tubes, and drains- Monitor endotracheal if appropriate and nasal secretions for changes in amount and color- [place] appropriate cooling/warming therapies per order- Administer medications as ordered- Instruct and encourage patient and family to use good hand hygiene technique- Identify and instruct in appropriate isolation precautions for identified infection/condition  Outcome: Progressing  Goal: Absence of fever/infection during neutropenic period  Description: INTERVENTIONS:- Monitor WBC  Outcome: Progressing     Problem: SAFETY ADULT  Goal: Patient will remain free of falls  Description: INTERVENTIONS:- Educate patient/family on patient safety including physical limitations- Instruct patient to call for assistance with activity - Consult OT/PT to assist with strengthening/mobility - Keep Call bell within reach- Keep bed low and locked with side rails adjusted as appropriate- Keep care items and personal belongings within reach- Initiate and maintain comfort rounds- Make Fall Risk Sign visible to staff- Offer Toileting every  Hours, in advance of need- Initiate/Maintain alarm- Obtain necessary fall risk management equipment: - Apply yellow socks and bracelet for high fall risk patients- Consider moving patient to room near nurses station  INTERVENTIONS:- Educate patient/family on patient safety including physical limitations- Instruct patient to call for assistance with activity - Consult OT/PT to assist with strengthening/mobility - Keep Call bell within reach- Keep bed low and locked with side rails adjusted as appropriate- Keep care items and personal belongings within reach-  Initiate and maintain comfort rounds- Make Fall Risk Sign visible to staff- Offer Toileting every  Hours, in advance of need- Initiate/Maintain alarm- Obtain necessary fall risk management equipment: - Apply yellow socks and bracelet for high fall risk patients- Consider moving patient to room near nurses station  Outcome: Progressing  Goal: Maintain or return to baseline ADL function  Description: INTERVENTIONS:-  Assess patient's ability to carry out ADLs; assess patient's baseline for ADL function and identify physical deficits which impact ability to perform ADLs (bathing, care of mouth/teeth, toileting, grooming, dressing, etc.)- Assess/evaluate cause of self-care deficits - Assess range of motion- Assess patient's mobility; develop plan if impaired- Assess patient's need for assistive devices and provide as appropriate- Encourage maximum independence but intervene and supervise when necessary- Involve family in performance of ADLs- Assess for home care needs following discharge - Consider OT consult to assist with ADL evaluation and planning for discharge- Provide patient education as appropriate  Outcome: Progressing  Goal: Maintains/Returns to pre admission functional level  Description: INTERVENTIONS:- Perform AM-PAC 6 Click Basic Mobility/ Daily Activity assessment daily.- Set and communicate daily mobility goal to care team and patient/family/caregiver. - Collaborate with rehabilitation services on mobility goals if consulted- Perform Range of Motion  times a day.- Reposition patient every  hours.- Dangle patient  times a day- Stand patient  times a day- Ambulate patient  times a day- Out of bed to chair  times a day - Out of bed for meals times a day- Out of bed for toileting- Record patient progress and toleration of activity level   Outcome: Progressing     Problem: DISCHARGE PLANNING  Goal: Discharge to home or other facility with appropriate resources  Description: INTERVENTIONS:- Identify barriers  to discharge w/patient and caregiver- Arrange for needed discharge resources and transportation as appropriate- Identify discharge learning needs (meds, wound care, etc.)- Arrange for interpretive services to assist at discharge as needed- Refer to Case Management Department for coordinating discharge planning if the patient needs post-hospital services based on physician/advanced practitioner order or complex needs related to functional status, cognitive ability, or social support system  Outcome: Progressing     Problem: Knowledge Deficit  Goal: Patient/family/caregiver demonstrates understanding of disease process, treatment plan, medications, and discharge instructions  Description: Complete learning assessment and assess knowledge base.Interventions:- Provide teaching at level of understanding- Provide teaching via preferred learning methods  Outcome: Progressing     Problem: NEUROSENSORY - ADULT  Goal: Achieves stable or improved neurological status  Description: INTERVENTIONS- Monitor and report changes in neurological status- Monitor vital signs such as temperature, blood pressure, glucose, and any other labs ordered - Initiate measures to prevent increased intracranial pressure- Monitor for seizure activity and implement precautions if appropriate    Outcome: Progressing  Goal: Remains free of injury related to seizures activity  Description: INTERVENTIONS- Maintain airway, patient safety  and administer oxygen as ordered- Monitor patient for seizure activity, document and report duration and description of seizure to physician/advanced practitioner- If seizure occurs,  ensure patient safety during seizure- Reorient patient post seizure- Seizure pads on all 4 side rails- Instruct patient/family to notify RN of any seizure activity including if an aura is experienced- Instruct patient/family to call for assistance with activity based on nursing assessment- Administer anti-seizure medications if  ordered  Outcome: Progressing  Goal: Achieves maximal functionality and self care  Description: INTERVENTIONS- Monitor swallowing and airway patency with patient fatigue and changes in neurological status- Encourage and assist patient to increase activity and self care. - Encourage visually impaired, hearing impaired and aphasic patients to use assistive/communication devices  Outcome: Progressing     Problem: Neurological Deficit  Goal: Neurological status is stable or improving  Description: Interventions:- Monitor and assess patient's level of consciousness, motor function, sensory function, and level of assistance needed for ADLs. - Monitor and report changes from baseline. Collaborate with interdisciplinary team to initiate plan and implement interventions as ordered. - Provide and maintain a safe environment.- Consider seizure precautions.- Consider fall precautions.- Consider aspiration precautions.- Consider bleeding precautions.  Outcome: Progressing     Problem: Activity Intolerance/Impaired Mobility  Goal: Mobility/activity is maintained at optimum level for patient  Description: Interventions:- Assess and monitor patient  barriers to mobility and need for assistive/adaptive devices.- Assess patient's emotional response to limitations.- Collaborate with interdisciplinary team and initiate plans and interventions as ordered.- Encourage independent activity per ability.- Maintain proper body alignment.- Perform active/passive rom as tolerated/ordered.- Plan activities to conserve energy.- Turn patient as appropriate  Outcome: Progressing     Problem: Communication Impairment  Goal: Ability to express needs and understand communication  Description: Assess patient's communication skills and ability to understand information.  Patient will demonstrate use of effective communication techniques, alternative methods of communication and understanding even if not able to speak. - Encourage communication and  provide alternate methods of communication as needed.- Collaborate with case management/ for discharge needs.- Include patient/family/caregiver in decisions related to communication.  Outcome: Progressing     Problem: Potential for Aspiration  Goal: Non-ventilated patient's risk of aspiration is minimized  Description: Assess and monitor vital signs, respiratory status, and labs (WBC).  Monitor for signs of aspiration (tachypnea, cough, rales, wheezing, cyanosis, fever).- Assess and monitor patient's ability to swallow.- Place patient up in chair to eat if possible.- HOB up at 90 degrees to eat if unable to get patient up into chair.- Supervise patient during oral intake. - Instruct patient/ family to take small bites.- Instruct patient/ family to take small single sips when taking liquids.- Follow patient-specific strategies generated by speech pathologist.  Outcome: Progressing  Goal: Ventilated patient's risk of aspiration is minimized  Description: Assess and monitor vital signs, respiratory status, airway cuff pressure, and labs (WBC).  Monitor for signs of aspiration (tachypnea, cough, rales, wheezing, cyanosis, fever).- Elevate head of bed 30 degrees if patient has tube feeding.- Monitor tube feeding.  Outcome: Progressing     Problem: Nutrition  Goal: Nutrition/Hydration status is improving  Description: Monitor and assess patient's nutrition/hydration status for malnutrition (ex- brittle hair, bruises, dry skin, pale skin and conjunctiva, muscle wasting, smooth red tongue, and disorientation). Collaborate with interdisciplinary team and initiate plan and interventions as ordered.  Monitor patient's weight and dietary intake as ordered or per policy. Utilize nutrition screening tool and intervene per policy. Determine patient's food preferences and provide high-protein, high-caloric foods as appropriate. - Assist patient with eating.- Allow adequate time for meals.- Encourage patient to take  dietary supplement as ordered.- Collaborate with clinical nutritionist.- Include patient/family/caregiver in decisions related to nutrition.  Outcome: Progressing

## 2025-04-23 NOTE — ASSESSMENT & PLAN NOTE
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  Denies other neurological abnormalities  On Decadron 2 mg twice daily at home  CTA head and neck showed vasogenic edema involving the left parietal and left occipital lobe similar compared to the recent brain MRI.  Small 5 mm hyperdense focus noted within the left parietal occipital lobe which corresponds to susceptibility and likely represents a small focus of hemorrhage within the dominant lesion in this region on prior brain MRI.  Re-demonstrated central necrotic mass involving paramediastinal anterior right upper lobe with surrounding scarring/fibrosis  MRI brain showing stable appearance of treated left brain metastases without any new metastases.  There is a small foci of right frontal diffusion signal most compatible with subacute infarcts  Echocardiogram: Left ventricular ejection fraction 75%.  No significant valvular abnormality    Plan  Neurology has been consulted, appreciate recommendations  Home Xarelto held and aspirin 81 mg started to avoid risk of hemorrhage.  Continue aspirin upon discharge and discontinue Xarelto.  Follow-up with Neurology in the outpatient setting to discuss anticoagulation.  Will need repeat MRI in about a month (towards end of May)  Frequent neurochecks  Stroke pathway with hemoglobin A1c and lipid panel. Added Lipitor 40 mg given elevated LDL  Continue home dose of Decadron 2 mg twice daily  Continue keppra  Continue follow-up with neurology in the outpatient setting

## 2025-04-23 NOTE — ASSESSMENT & PLAN NOTE
On chemotherapy, last chemotherapy was 3 weeks back-carboplatin and paclitaxel  Follows up with Dr. Castro and radiation oncology  S/p radiation therapy from July to September, was on Keytruda which was stopped secondary to pneumonitis, on tapering dose of Decadron  Has had stereotactic radiation to brain twice-last stereotactic radiation in January 2025  Currently on 2 mg Decadron twice daily  Discuss with primary oncologist in the outpatient setting to reevaluate timing of systemic therapy

## 2025-04-23 NOTE — DISCHARGE INSTR - AVS FIRST PAGE
Dear Ayla Nye,     It was our pleasure to care for you here at [facility].  It is our hope that we were always able to exceed the expected standards for your care during your stay.  You were hospitalized due to left sided weakness and stroke.  You were cared for on the South 1st floor by Jaci Chu MD under the service of Perla Law MD with the [facility] who covers for your primary care physician (PCP), Rafy Angelo MD, while you were hospitalized.  If you have any questions or concerns related to this hospitalization, you may contact us at .  For follow up as well as any medication refills, we recommend that you follow up with your primary care physician.  A registered nurse will reach out to you by phone within a few days after your discharge to answer any additional questions that you may have after going home.  However, at this time we provide for you here, the most important instructions / recommendations at discharge:     Notable Medication Adjustments -   Please stop taking Xarelto, instead please continue taking Aspirin 81 mg daily  Continue taking Lipitor 40 mg daily for your cholesterol   You can continue using the Maalox as needed for indigestion or heartburn every 4 hours  Continue taking the rest of your medications as prescribed  Testing Required after Discharge -   None  ** Please contact your PCP to request testing orders for any of the testing recommended here **  Important follow up information -   Please follow-up with neurology in the outpatient setting. You will need repeat imaging in a few weeks. Please continue to follow-up with your radiation oncologist and oncologist  Please make an appointment with with your PCP within 1 week after you leave the hospital  Other Instructions -   It was a pleasure to take care of you during your hospital stay, we wish you all the best!  Please review this entire after  visit summary as additional general instructions including medication list, appointments, activity, diet, any pertinent wound care, and other additional recommendations from your care team that may be provided for you.      Sincerely,     Jaci Chu MD

## 2025-04-23 NOTE — PROGRESS NOTES
Progress Note - Hospitalist   Name: Ayla Nye 74 y.o. female I MRN: [id number]  Unit/Bed#: -01 I Date of Admission: 4/21/2025   Date of Service: 4/23/2025 I Hospital Day: 2    Assessment & Plan  Worsening weakness of left upper extremity  History of stage IV adenocarcinoma on chemotherapy with mets to brain status post radiation  Baseline left upper and lower extremity weakness  Presents with worsening left upper extremity weakness.  Denies other neurological abnormalities  On Decadron 2 mg twice daily at home  CTA head and neck showed vasogenic edema involving the left parietal and left occipital lobe similar compared to the recent brain MRI.  Small 5 mm hyperdense focus noted within the left parietal occipital lobe which corresponds to susceptibility and likely represents a small focus of hemorrhage within the dominant lesion in this region on prior brain MRI.  Re-demonstrated central necrotic mass involving paramediastinal anterior right upper lobe with surrounding scarring/fibrosis  MRI brain showing stable appearance of treated left brain metastases without any new metastases.  There is a small foci of right frontal diffusion signal most compatible with subacute infarcts    Plan  Neurology has been consulted, appreciate recommendations  Home Xarelto held and aspirin 81 mg started to avoid risk of hemorrhage.  Continue aspirin upon discharge and discontinue Xarelto  Follow-up echocardiogram  Frequent neurochecks  Stroke pathway with hemoglobin A1c and lipid panel.  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 Castro and radiation oncology  S/p radiation therapy from July to September, was on Keytruda which  was stopped secondary to pneumonitis, on tapering dose of Decadron  Has had stereotactic radiation to brain twice-last stereotactic radiation in January 2025  Currently on 2 mg Decadron twice daily  Discuss with primary oncologist in the outpatient setting to reevaluate timing of systemic therapy  Anemia  History of anemia and history of vitamin B12 deficiency   Hemoglobin baseline 8.7-9 .5  Continue to monitor for any signs of bleeding  Hypertension  History of hypertension  On amlodipine 5 mg daily  Blood Pressure goal-normotensive  As needed IV labetalol ordered  History of pulmonary embolism  Patient has had history of pulmonary embolism  On Xarelto 10 mg daily prior to admission  Xarelto currently held, aspirin started for prior concerns of hemorrhage.  MRI showing subacute infarcts.  Patient is at high risk of PE, risks versus benefits discussion.  Patient and daughter agreeable to continuing aspirin instead of Xarelto  Hypothyroidism  On levothyroxine 50 mcg daily  NSVT (nonsustained ventricular tachycardia) (HCC)  Episode of nonsustained V. tach 4/23 morning.  Patient asymptomatic at this time.  Potassium and magnesium within normal range.  Continue to monitor on telemetry, follow-up with echocardiogram    VTE Pharmacologic Prophylaxis: VTE Score: 7 SCDs, will discuss on rounds    Mobility:   Basic Mobility Inpatient Raw Score: 24  JH-HLM Goal: 8: Walk 250 feet or more  JH-HLM Achieved: 8: Walk 250 feet ot more  JH-HLM Goal achieved. Continue to encourage appropriate mobility.    Patient Centered Rounds: I performed bedside rounds with nursing staff today.     Education and Discussions with Family / Patient: Updated  (daughter) at bedside.    Current Length of Stay: 2 day(s)  Current Patient Status: Inpatient   Certification Statement: The patient will continue to require additional inpatient hospital stay due to PT/OT evaluation  Discharge Plan: Anticipate discharge later today or tomorrow to  discharge location to be determined pending rehab evaluations.    Code Status: Level 1 - Full Code    Subjective   Patient reports doing well overnight.  She denies any worsening weakness, numbness, visual changes, headache, speech difficulty.  She has been ambulating to the bathroom although she still feels a little unsteady on the left side.  She is tolerating p.o. intake well and having regular bowel movements.  No new reports of any bleeding.  She denied any symptoms during the episode of V. tach this morning.      Objective :  Temp:  [97.6 °F (36.4 °C)-98.4 °F (36.9 °C)] 98 °F (36.7 °C)  HR:  [59-79] 79  BP: (122-152)/(62-82) 144/75  Resp:  [16-18] 18  SpO2:  [90 %-95 %] 95 %  O2 Device: None (Room air)    There is no height or weight on file to calculate BMI.     Input and Output Summary (last 24 hours):     Intake/Output Summary (Last 24 hours) at 4/23/2025 0647  Last data filed at 4/22/2025 1940  Gross per 24 hour   Intake 681 ml   Output --   Net 681 ml       Physical Exam  Vitals reviewed.   Constitutional:       Appearance: She is not diaphoretic.   HENT:      Head: Normocephalic and atraumatic.      Mouth/Throat:      Mouth: Mucous membranes are moist.   Eyes:      Extraocular Movements: Extraocular movements intact.      Conjunctiva/sclera: Conjunctivae normal.      Pupils: Pupils are equal, round, and reactive to light.   Cardiovascular:      Rate and Rhythm: Normal rate and regular rhythm.   Pulmonary:      Effort: Pulmonary effort is normal. No respiratory distress.      Breath sounds: Normal breath sounds. No rales.   Abdominal:      Palpations: Abdomen is soft.      Tenderness: There is no abdominal tenderness. There is no guarding or rebound.   Musculoskeletal:      Right lower leg: No edema.      Left lower leg: No edema.   Skin:     General: Skin is warm and dry.   Neurological:      Mental Status: She is alert.      Cranial Nerves: No cranial nerve deficit.      Sensory: No sensory deficit.       Comments: 4/5 strength in left upper extremity and left lower extremity   Psychiatric:         Mood and Affect: Mood normal.         Behavior: Behavior normal.         Thought Content: Thought content normal.               Telemetry:  Telemetry Orders (From admission, onward)               24 Hour Telemetry Monitoring  Continuous x 24 Hours (Telem)        Expiring   Question:  Reason for 24 Hour Telemetry  Answer:  TIA/Suspected CVA/ Confirmed CVA                     Telemetry Reviewed: Normal Sinus Rhythm episode of nonsustained V. tach around 13 beats this morning around 6:30 AM  Indication for Continued Telemetry Use: Acute CVA               Lab Results: I have reviewed the following results:   Results from last 7 days   Lab Units 04/23/25  0444 04/22/25  0454   WBC Thousand/uL 9.59 10.10   HEMOGLOBIN g/dL 9.3* 8.6*   HEMATOCRIT % 30.6* 28.9*   PLATELETS Thousands/uL 400* 387   LYMPHO PCT %  --  4*   MONO PCT %  --  5   EOS PCT %  --  0     Results from last 7 days   Lab Units 04/23/25  0444   SODIUM mmol/L 140   POTASSIUM mmol/L 4.2   CHLORIDE mmol/L 105   CO2 mmol/L 24   BUN mg/dL 22   CREATININE mg/dL 0.86   ANION GAP mmol/L 11   CALCIUM mg/dL 9.1   GLUCOSE RANDOM mg/dL 93     Results from last 7 days   Lab Units 04/21/25  1026   INR  1.29*                   Recent Cultures (last 7 days):               Last 24 Hours Medication List:     Current Facility-Administered Medications:     acetaminophen (TYLENOL) tablet 975 mg, Q8H KRISS    aluminum-magnesium hydroxide-simethicone (MAALOX) oral suspension 30 mL, Q4H PRN    amLODIPine (NORVASC) tablet 5 mg, Daily    aspirin chewable tablet 81 mg, Daily    cyanocobalamin (VITAMIN B-12) tablet 1,000 mcg, Daily    dexamethasone (DECADRON) tablet 2 mg, BID With Meals    gabapentin (NEURONTIN) capsule 100 mg, BID (AM & Afternoon)    gabapentin (NEURONTIN) capsule 300 mg, HS    HYDROmorphone (DILAUDID) tablet 2 mg, Q4H PRN    labetalol (NORMODYNE) injection 10 mg, Q6H PRN     levETIRAcetam (KEPPRA) tablet 1,000 mg, BID    levothyroxine tablet 50 mcg, Early Morning    melatonin tablet 3 mg, HS    ondansetron (ZOFRAN-ODT) dispersible tablet 4 mg, Q6H PRN    pantoprazole (PROTONIX) EC tablet 40 mg, Daily Before Breakfast    senna (SENOKOT) tablet 8.6 mg, Daily    sulfamethoxazole-trimethoprim (BACTRIM DS) 800-160 mg per tablet 1 tablet, Once per day on Monday Wednesday Friday    Administrative Statements   Today, Patient Was Seen By: Jaci Chu MD      **Please Note: This note may have been constructed using a voice recognition system.**

## 2025-04-23 NOTE — DISCHARGE SUMMARY
Discharge Summary - Hospitalist   Name: Ayla Nye 74 y.o. female I MRN: [id number]  Unit/Bed#: -01 I Date of Admission: 4/21/2025   Date of Service: 4/23/2025 I Hospital Day: 2     Assessment & Plan  Worsening weakness of left upper extremity  History of stage IV adenocarcinoma on chemotherapy with mets to brain status post radiation  Baseline left upper and lower extremity weakness  Presents with worsening left upper extremity weakness.  Denies other neurological abnormalities  On Decadron 2 mg twice daily at home  CTA head and neck showed vasogenic edema involving the left parietal and left occipital lobe similar compared to the recent brain MRI.  Small 5 mm hyperdense focus noted within the left parietal occipital lobe which corresponds to susceptibility and likely represents a small focus of hemorrhage within the dominant lesion in this region on prior brain MRI.  Re-demonstrated central necrotic mass involving paramediastinal anterior right upper lobe with surrounding scarring/fibrosis  MRI brain showing stable appearance of treated left brain metastases without any new metastases.  There is a small foci of right frontal diffusion signal most compatible with subacute infarcts  Echocardiogram: Left ventricular ejection fraction 75%.  No significant valvular abnormality    Plan  Neurology has been consulted, appreciate recommendations  Home Xarelto held and aspirin 81 mg started to avoid risk of hemorrhage.  Continue aspirin upon discharge and discontinue Xarelto.  Follow-up with Neurology in the outpatient setting to discuss anticoagulation.  Will need repeat MRI in about a month (towards end of May)  Frequent neurochecks  Stroke pathway with hemoglobin A1c and lipid panel. Added Lipitor 40 mg given elevated LDL  Continue home dose of Decadron 2 mg twice daily  Continue keppra  Continue follow-up with neurology in the outpatient setting  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  (HCC)  On chemotherapy, last chemotherapy was 3 weeks back-carboplatin and paclitaxel  Follows up with Dr. Castro and radiation oncology  S/p radiation therapy from July to September, was on Keytruda which was stopped secondary to pneumonitis, on tapering dose of Decadron  Has had stereotactic radiation to brain twice-last stereotactic radiation in January 2025  Currently on 2 mg Decadron twice daily  Discuss with primary oncologist in the outpatient setting to reevaluate timing of systemic therapy  Anemia  History of anemia and history of vitamin B12 deficiency   Hemoglobin baseline 8.7-9 .5  Continue to monitor for any signs of bleeding  Hypertension  History of hypertension  On amlodipine 5 mg daily  Blood Pressure goal-normotensive  History of pulmonary embolism  Patient has had history of pulmonary embolism  On Xarelto 10 mg daily prior to admission  Xarelto currently held, aspirin started for prior concerns of hemorrhage.  MRI showing subacute infarcts.  Patient is at high risk of PE, risks versus benefits discussion.  Patient and daughter agreeable to continuing aspirin instead of Xarelto  Hypothyroidism  On levothyroxine 50 mcg daily  NSVT (nonsustained ventricular tachycardia) (HCC)  Episode of nonsustained V. tach 4/23 morning.  Patient asymptomatic at this time. No known prior episodes   Potassium and magnesium within normal range.     Medical Problems       Resolved Problems  Date Reviewed: 4/7/2025   None       Discharging Physician / Practitioner: Jaci Chu MD  PCP: Rafy Angelo MD  Admission Date:   Admission Orders (From admission, onward)       Ordered        04/21/25 1303  INPATIENT ADMISSION  Once                          Discharge Date: 04/23/25    Consultations During Hospital Stay:  Neurology  Hematology/Oncology  PT/OT    Procedures Performed:   None    Significant Findings / Test Results:   MRI brain: Stable appearance of the treated left brain metastases. No new metastases. Small foci  of right frontal diffusion signal most compatible with subacute infarcts.  CTA head and neck:  Vasogenic edema again demonstrated involving the left parietal and left occipital lobes, similar when compared to recent brain MRI. Small 5 mm hyperdense focus noted within the left parietal occipital lobe, which corresponds to susceptibility and likely represents a small focus of hemorrhage within the dominant lesion in this region on prior brain MRI. The known intracranial metastatic lesions are otherwise difficult to visualize on this study. CT Angiography: No large vessel occlusion, high-grade stenosis, or intracranial aneurysm identified on CT angiogram of the head. No hemodynamically significant stenosis or dissection identified on CT angiogram of the neck. Centrally necrotic mass again demonstrated involving the paramediastinal anterior right upper lobe, with surrounding scarring/fibrosis.  Echocardiogram showing ejection fraction 75%    Incidental Findings:   none     Test Results Pending at Discharge (will require follow up):   none     Outpatient Tests Requested:  MRI in a month    Complications:  none    Reason for Admission: Left-sided weakness    Hospital Course:   Ayla Nye is a 74 y.o. female patient who originally presented to the hospital on 4/21/2025 due to worsening left-sided weakness.  CTA head and neck showed vasogenic edema involving the left parietal and left occipital lobe similar to prior imaging.  There was a small 5 mm hyperdense focus noted in the left parietal-occipital lobe which corresponds to susceptibility and likely represents small focus of hemorrhage.  Neurology and hematology oncology was consulted.  Brain MRI showed stable appearance of brain metastases without any new ones.  There was a small foci of right frontal diffusion signal most compatible with subacute infarcts.  Her home Xarelto was held in the setting of microhemorrhages and she was transitioned to aspirin 81 mg  "daily for which she will continue upon discharge.  Patient aware of the risk and benefits regarding Xarelto versus aspirin and risk of bleeding versus further thrombosis.  Lipid panel notable for elevated LDL cholesterol so she was started on Lipitor.  Echocardiogram showed ejection fraction 75% with hyperdynamic systolic function and no significant valvular abnormalities.  Patient will continue the rest of her home medications and follow-up closely with oncology, radiation oncology, and neurology.  Strict return precautions to ER were given.  Patient was seen by PT/OT who recommended [facility] placement upon discharge, PMR recommended SNF but patient refused and opted for home with home health. She has caregivers who come to her home.     Please see above list of diagnoses and related plan for additional information.     Condition at Discharge: fair    Discharge Day Visit / Exam:   Subjective:   Patient eager to go home. She denies any worsening weakness, numbness, headache, chest pain, dizziness. No signs of any bleeding.     Vitals: Blood Pressure: 119/79 (04/24/25 0721)  Pulse: 71 (04/24/25 0721)  Temperature: 97.8 °F (36.6 °C) (04/24/25 0721)  Temp Source: Oral (04/23/25 1510)  Respirations: 19 (04/23/25 2222)  Height: 5' 2\" (157.5 cm) (04/23/25 2015)  Weight - Scale: 53 kg (116 lb 13.5 oz) (04/23/25 2015)  SpO2: 95 % (04/24/25 0721)  Physical Exam  Vitals reviewed.   Constitutional:       Appearance: She is not diaphoretic.   HENT:      Head: Normocephalic and atraumatic.      Mouth/Throat:      Mouth: Mucous membranes are moist.   Eyes:      Conjunctiva/sclera: Conjunctivae normal.   Cardiovascular:      Rate and Rhythm: Normal rate and regular rhythm.   Pulmonary:      Effort: Pulmonary effort is normal.      Breath sounds: Normal breath sounds.   Abdominal:      Palpations: Abdomen is soft.      Tenderness: There is no abdominal tenderness. There is no guarding or rebound.   Musculoskeletal:      Right " lower leg: No edema.      Left lower leg: No edema.   Skin:     General: Skin is warm and dry.   Neurological:      Mental Status: She is alert.      Comments: 4/5 strength in left upper extremity  4/5 strength in left lower extremity   Psychiatric:         Mood and Affect: Mood normal.         Behavior: Behavior normal.         Thought Content: Thought content normal.          Discussion with Family: Updated  (daughter) at bedside.    Discharge instructions/Information to patient and family:   See after visit summary for information provided to patient and family.      Provisions for Follow-Up Care:  See after visit summary for information related to follow-up care and any pertinent home health orders.      Mobility at time of Discharge:   Basic Mobility Inpatient Raw Score: 24  JH-HLM Goal: 8: Walk 250 feet or more  JH-HLM Achieved: 8: Walk 250 feet ot more  HLM Goal achieved. Continue to encourage appropriate mobility.     Disposition:   Home with VNA Services (Reminder: Complete face to face encounter)    Planned Readmission: no    Discharge Medications:  See after visit summary for reconciled discharge medications provided to patient and/or family.      Administrative Statements   Discharge Statement:  I have spent a total time of 30 minutes in caring for this patient on the day of the visit/encounter. .    **Please Note: This note may have been constructed using a voice recognition system**

## 2025-04-23 NOTE — ASSESSMENT & PLAN NOTE
History of anemia and history of vitamin B12 deficiency   Hemoglobin baseline 8.7-9 .5  Continue to monitor for any signs of bleeding

## 2025-04-23 NOTE — TELEPHONE ENCOUNTER
STILL ADMITTED:4/21/2025 - present (2 days)  Formerly [facility]        HFU/ MARIE MARES/ Worsening weakness of left upper extremity     ----- Message from Sarah Velásquez sent at 4/23/2025 11:06 AM EDT -----  Regarding: hfu  Ayla Nye will need follow-up in in 4 weeks with neurovascular team for Other= ATTENDING  in 60 minute appointment. They will not require outpatient neurological testing.    Patient may need a repeat MRI or NCCTH after re-evaluation in the clinic.

## 2025-04-23 NOTE — ASSESSMENT & PLAN NOTE
Episode of nonsustained V. tach 4/23 morning.  Patient asymptomatic at this time. No known prior episodes   Potassium and magnesium within normal range.

## 2025-04-23 NOTE — PROGRESS NOTES
Progress Note - Neurology   Name: Ayla Nye 74 y.o. female I MRN: [id number]  Unit/Bed#: -01 I Date of Admission: 4/21/2025   Date of Service: 4/23/2025 I Hospital Day: 2    Assessment & Plan  Worsening weakness of left upper extremity  Presents with left upper and lower extremities weakness for the last week, dropping small objects, 1 reported fall  On physical exam strength is 3/5 on the left side in upper and lower extremities, weakened left hand   No facial asymmetry, no dysphagia, normal coordination tests.   Reports daily nose bleeds for several minutes since she was started on Xarelto  Pt is taking Decadron 2 mg BID at home and Keppra 1000 mg BID  S/p Chemo, s/p radiation, s/p immunotherapy (see adenocarcinoma problem)  S/p stereotactic brain radiation in Aug [address]2530 and Jan 2025, follow up natan MRI from 3/18/25 suggested improvement of vasogenic edema  CTA head and neck from 4/21/25 : Vasogenic edema again demonstrated involving the left parietal and left occipital lobes, similar when compared to recent brain MRI. Small 5 mm hyperdense focus noted within the left parietal occipital lobe, which likely represents a small focus of hemorrhage within the dominant lesion in this region on prior brain MRI.   MRI brain : Stable appearance of the treated left brain metastases. No new metastases. Small foci of right frontal diffusion signal most compatible with subacute infarcts.     Today pt is feeling well, LLE strength slightly improved since yesterday    Plan:    Continue Decadron to 2 mg BID  C/w Keppra 1000 mg BID  Continue holding Xarelto on discharge  C/w Asa 81 mg on Discharge. Statin benefits is questionable in settings of guarded prognosis. We'll leave to discretion of the primary team.  Follow  up with neurology follow up in 2-4 weeks in outpatient settings with repeat MRI in about a month. AC restart should be reconsidered in outpatient setting. If later on pt is restarted on Xarelto than  asa could be discontinued.  ECHO report is pending  Follow up with oncology   Prognosis remains guarded  Pt is cleared for discharge from neurologic stand point with neurology follow up.  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 Castro, last visit on 4/14/2025  Last chemo with carboplatin and paclitaxel on 1/23/2025  S/p radiation in July to Sep 2024, s/p Keytruda (dc'd due to pneumonitis)  S/p Stereotactic radiation in Aug 2024 and Jan 2025   Plan:  Follow Hem onc consult recs  Hypertension  Maintain normotension, management as per primary team  Hypothyroidism  C/w levothyroxine 50 mcg daily  Anemia  Hgb is stable, management as per primary team  History of pulmonary embolism  On Xarelto  Hold on DC in settings of high risk of bleed    Ayla Nye will need follow up in in 4 weeks with general attending. She will require a repeat MRI within 4-6 weeks.      Subjective   Evaluated pt at the bedside. She is pleasant, not in acute distress, states that she is feeling well. Pt denies any pain, dizziness, vision changes or new weakness. She admits that her Left hand strength didn't change since yesterday however thinks that her left lower extremity strength improved somewhat. MRI results were discussed with the pt and her daughter at the bedside, they were able to ask questions and expressed understanding. Pt is in agreement of current management with asa.    Review of Systems   Constitutional:  Negative for chills and fever.   HENT:  Negative for ear pain and sore throat.    Eyes:  Negative for pain and visual disturbance.   Respiratory:  Negative for cough and shortness of breath.    Cardiovascular:  Negative for chest pain and palpitations.   Gastrointestinal:  Negative for abdominal pain and vomiting.   Genitourinary:  Negative for dysuria and hematuria.   Musculoskeletal:  Positive for gait problem. Negative for arthralgias and back pain.   Skin:  Negative for color  change and rash.   Neurological:  Positive for weakness. Negative for seizures and syncope.   All other systems reviewed and are negative.    negative, migraine headaches, syncope, seizures, paralysis, numbness or tingling of hands, numbness or tingling of feet, involuntary movements, tremor, paresis, spasticity, developmental delay, cerebral palsy, microcephaly, posturing, generalized seizures, focal seizures, fainting    Objective :  Temp:  [97.7 °F (36.5 °C)-98.4 °F (36.9 °C)] 97.7 °F (36.5 °C)  HR:  [59-79] 73  BP: (122-152)/(62-82) 133/72  Resp:  [16-18] 16  SpO2:  [90 %-95 %] 95 %  O2 Device: None (Room air)    Physical Exam  Vitals and nursing note reviewed.   Constitutional:       General: She is awake. She is not in acute distress.     Appearance: She is well-developed. She is not ill-appearing.   HENT:      Head: Normocephalic and atraumatic.   Eyes:      Conjunctiva/sclera: Conjunctivae normal.   Cardiovascular:      Rate and Rhythm: Normal rate and regular rhythm.      Heart sounds: No murmur heard.  Pulmonary:      Effort: Pulmonary effort is normal. No respiratory distress.      Breath sounds: Normal breath sounds.   Abdominal:      Palpations: Abdomen is soft.      Tenderness: There is no abdominal tenderness.   Musculoskeletal:         General: No swelling.      Cervical back: Neck supple.      Right lower leg: No edema.      Left lower leg: No edema.   Skin:     General: Skin is warm and dry.      Capillary Refill: Capillary refill takes less than 2 seconds.   Neurological:      Mental Status: She is alert and oriented to person, place, and time. Mental status is at baseline.      Cranial Nerves: No cranial nerve deficit.      Sensory: No sensory deficit.      Motor: Weakness present.      Coordination: Coordination normal.      Gait: Gait abnormal.      Comments: Strength in LUE 3/5 with weakened   Strength in the LLE 3-4/5, slight improvement since yesterday   Psychiatric:         Mood and  Affect: Mood normal.         Speech: Speech normal.         Behavior: Behavior normal.         Thought Content: Thought content normal.         Judgment: Judgment normal.     Neurological Exam  Mental Status  Awake and alert. Oriented to person, place and time. Oriented to person, place, and time. Able to copy figure. Speech is normal. Language is fluent with no aphasia. Attention and concentration are normal. Fund of knowledge is appropriate for level of education.    Gait   Abnormal gait.  Strength in LUE 3/5 with weakened   Strength in the LLE 3-4/5, slight improvement since yesterday.        Lab Results: I have reviewed the following results:        VTE Pharmacologic Prophylaxis: previously on hold due to brain mts bleed. Should be restarted if pt is staying in the hospital. If discharged proceed with asa.

## 2025-04-23 NOTE — QUICK NOTE
Is septic from the nurse that patient had a fall in the bathroom but did not hit head.  Ordered Tylenol to give and lumbar x-ray to rule out fracture.  Went to see and evaluate the patient at bedside.  At that time patient denies having any pain and improved with Tylenol.  X-ray lumbar pending result

## 2025-04-23 NOTE — PLAN OF CARE
Problem: Potential for Falls  Goal: Patient will remain free of falls  Description: INTERVENTIONS:- Educate patient/family on patient safety including physical limitations- Instruct patient to call for assistance with activity - Consult OT/PT to assist with strengthening/mobility - Keep Call bell within reach- Keep bed low and locked with side rails adjusted as appropriate- Keep care items and personal belongings within reach- Initiate and maintain comfort rounds- Make Fall Risk Sign visible to staff-Pt high fall- refused tx. Paper hanging in room.     INTERVENTIONS:- Educate patient/family on patient safety including physical limitations- Instruct patient to call for assistance with activity - Consult OT/PT to assist with strengthening/mobility - Keep Call bell within reach- Keep bed low and locked with side rails adjusted as appropriate- Keep care items and personal belongings within reach- Initiate and maintain comfort rounds- Make Fall Risk Sign visible to staff- Pt high fall- refused tx. Paper hanging in room.   Outcome: Progressing     Problem: PAIN - ADULT  Goal: Verbalizes/displays adequate comfort level or baseline comfort level  Description: Interventions:- Encourage patient to monitor pain and request assistance- Assess pain using appropriate pain scale- Administer analgesics based on type and severity of pain and evaluate response- Implement non-pharmacological measures as appropriate and evaluate response- Consider cultural and social influences on pain and pain management- Notify physician/advanced practitioner if interventions unsuccessful or patient reports new pain  Outcome: Progressing     Problem: INFECTION - ADULT  Goal: Absence or prevention of progression during hospitalization  Description: INTERVENTIONS:- Assess and monitor for signs and symptoms of infection- Monitor lab/diagnostic results- Monitor all insertion sites, i.e. indwelling lines, tubes, and drains- Monitor endotracheal if  appropriate and nasal secretions for changes in amount and color- Rewey appropriate cooling/warming therapies per order- Administer medications as ordered- Instruct and encourage patient and family to use good hand hygiene technique- Identify and instruct in appropriate isolation precautions for identified infection/condition  Outcome: Progressing       Problem: DISCHARGE PLANNING  Goal: Discharge to home or other facility with appropriate resources  Description: INTERVENTIONS:- Identify barriers to discharge w/patient and caregiver- Arrange for needed discharge resources and transportation as appropriate- Identify discharge learning needs (meds, wound care, etc.)- Arrange for interpretive services to assist at discharge as needed- Refer to Case Management Department for coordinating discharge planning if the patient needs post-hospital services based on physician/advanced practitioner order or complex needs related to functional status, cognitive ability, or social support system  Outcome: Progressing

## 2025-04-23 NOTE — ASSESSMENT & PLAN NOTE
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  Denies other neurological abnormalities  On Decadron 2 mg twice daily at home  CTA head and neck showed vasogenic edema involving the left parietal and left occipital lobe similar compared to the recent brain MRI.  Small 5 mm hyperdense focus noted within the left parietal occipital lobe which corresponds to susceptibility and likely represents a small focus of hemorrhage within the dominant lesion in this region on prior brain MRI.  Re-demonstrated central necrotic mass involving paramediastinal anterior right upper lobe with surrounding scarring/fibrosis  MRI brain showing stable appearance of treated left brain metastases without any new metastases.  There is a small foci of right frontal diffusion signal most compatible with subacute infarcts    Plan  Neurology has been consulted, appreciate recommendations  Home Xarelto held and aspirin 81 mg started to avoid risk of hemorrhage.  Continue aspirin upon discharge and discontinue Xarelto  Follow-up echocardiogram  Frequent neurochecks  Stroke pathway with hemoglobin A1c and lipid panel.  Consider adding on Lipitor 40 mg given elevated LDL  Continue home dose of Decadron 2 mg twice daily  Continue keppra  Continue follow-up with neurology in the outpatient setting

## 2025-04-23 NOTE — PHYSICAL THERAPY NOTE
PHYSICAL THERAPY CANCELLATION NOTE          Patient Name: Ayla Nye  Today's Date: 4/23/2025 04/23/25 [id number]   Note Type   Note type Cancelled Session   Cancel Reasons Medical status   Additional Comments PT eval orders received, chart review performed. Notified by RAYMUNDO Dias pt w/ fall in bathroom and is currently pending lumbar spine imaging. Will hold PT at this time. PT will continue to follow pt as appropriate and as schedule allows.           Kaye Ramírez, PT, DPT  04/23/25

## 2025-04-24 ENCOUNTER — HOSPITAL ENCOUNTER (OUTPATIENT)
Dept: INFUSION CENTER | Facility: HOSPITAL | Age: 75
Discharge: HOME | End: 2025-04-24
Attending: INTERNAL MEDICINE

## 2025-04-24 PROBLEM — R53.1 LEFT-SIDED WEAKNESS: Status: ACTIVE | Noted: 2025-04-21

## 2025-04-24 LAB
ANION GAP SERPL CALCULATED.3IONS-SCNC: 10 MMOL/L (ref 4–13)
ANISOCYTOSIS BLD QL SMEAR: PRESENT
BASOPHILS # BLD MANUAL: 0 THOUSAND/UL (ref 0–0.1)
BASOPHILS NFR MAR MANUAL: 0 % (ref 0–1)
BUN SERPL-MCNC: 21 MG/DL (ref 5–25)
CALCIUM SERPL-MCNC: 9 MG/DL (ref 8.4–10.2)
CHLORIDE SERPL-SCNC: 106 MMOL/L (ref 96–108)
CO2 SERPL-SCNC: 23 MMOL/L (ref 21–32)
CREAT SERPL-MCNC: 0.81 MG/DL (ref 0.6–1.3)
EOSINOPHIL # BLD MANUAL: 0 THOUSAND/UL (ref 0–0.4)
EOSINOPHIL NFR BLD MANUAL: 0 % (ref 0–6)
ERYTHROCYTE [DISTWIDTH] IN BLOOD BY AUTOMATED COUNT: 17.4 % (ref 11.6–15.1)
GFR SERPL CREATININE-BSD FRML MDRD: 71 ML/MIN/1.73SQ M
GLUCOSE SERPL-MCNC: 82 MG/DL (ref 65–140)
HCT VFR BLD AUTO: 29.8 % (ref 34.8–46.1)
HGB BLD-MCNC: 9.1 G/DL (ref 11.5–15.4)
LYMPHOCYTES # BLD AUTO: 0.41 THOUSAND/UL (ref 0.6–4.47)
LYMPHOCYTES # BLD AUTO: 4 % (ref 14–44)
MAGNESIUM SERPL-MCNC: 2.2 MG/DL (ref 1.9–2.7)
MCH RBC QN AUTO: 28.4 PG (ref 26.8–34.3)
MCHC RBC AUTO-ENTMCNC: 30.5 G/DL (ref 31.4–37.4)
MCV RBC AUTO: 93 FL (ref 82–98)
METAMYELOCYTE ABSOLUTE CT: 0.2 THOUSAND/UL (ref 0–0.1)
METAMYELOCYTES NFR BLD MANUAL: 2 % (ref 0–1)
MONOCYTES # BLD AUTO: 0.41 THOUSAND/UL (ref 0–1.22)
MONOCYTES NFR BLD: 4 % (ref 4–12)
MYELOCYTE ABSOLUTE CT: 0.31 THOUSAND/UL (ref 0–0.1)
MYELOCYTES NFR BLD MANUAL: 3 % (ref 0–1)
NEUTROPHILS # BLD MANUAL: 8.88 THOUSAND/UL (ref 1.85–7.62)
NEUTS SEG NFR BLD AUTO: 87 % (ref 43–75)
PLATELET # BLD AUTO: 411 THOUSANDS/UL (ref 149–390)
PLATELET BLD QL SMEAR: ABNORMAL
PLATELET CLUMP BLD QL SMEAR: PRESENT
PMV BLD AUTO: 9.3 FL (ref 8.9–12.7)
POIKILOCYTOSIS BLD QL SMEAR: PRESENT
POLYCHROMASIA BLD QL SMEAR: PRESENT
POTASSIUM SERPL-SCNC: 4.3 MMOL/L (ref 3.5–5.3)
RBC # BLD AUTO: 3.2 MILLION/UL (ref 3.81–5.12)
RBC MORPH BLD: PRESENT
SODIUM SERPL-SCNC: 139 MMOL/L (ref 135–147)
WBC # BLD AUTO: 10.21 THOUSAND/UL (ref 4.31–10.16)

## 2025-04-24 PROCEDURE — 99223 1ST HOSP IP/OBS HIGH 75: CPT

## 2025-04-24 PROCEDURE — 99232 SBSQ HOSP IP/OBS MODERATE 35: CPT | Performed by: INTERNAL MEDICINE

## 2025-04-24 PROCEDURE — 85007 BL SMEAR W/DIFF WBC COUNT: CPT

## 2025-04-24 PROCEDURE — 80048 BASIC METABOLIC PNL TOTAL CA: CPT

## 2025-04-24 PROCEDURE — 97167 OT EVAL HIGH COMPLEX 60 MIN: CPT

## 2025-04-24 PROCEDURE — 83735 ASSAY OF MAGNESIUM: CPT

## 2025-04-24 PROCEDURE — NC001 PR NO CHARGE: Performed by: STUDENT IN AN ORGANIZED HEALTH CARE EDUCATION/TRAINING PROGRAM

## 2025-04-24 PROCEDURE — 85027 COMPLETE CBC AUTOMATED: CPT

## 2025-04-24 PROCEDURE — 97163 PT EVAL HIGH COMPLEX 45 MIN: CPT

## 2025-04-24 RX ADMIN — GABAPENTIN 300 MG: 300 CAPSULE ORAL at 21:34

## 2025-04-24 RX ADMIN — LEVETIRACETAM 1000 MG: 500 TABLET, FILM COATED ORAL at 21:34

## 2025-04-24 RX ADMIN — ACETAMINOPHEN 975 MG: 325 TABLET, FILM COATED ORAL at 13:17

## 2025-04-24 RX ADMIN — Medication 1000 MCG: at 09:37

## 2025-04-24 RX ADMIN — AMLODIPINE BESYLATE 5 MG: 5 TABLET ORAL at 09:37

## 2025-04-24 RX ADMIN — ACETAMINOPHEN 975 MG: 325 TABLET, FILM COATED ORAL at 21:34

## 2025-04-24 RX ADMIN — ATORVASTATIN CALCIUM 40 MG: 40 TABLET, FILM COATED ORAL at 16:26

## 2025-04-24 RX ADMIN — PANTOPRAZOLE SODIUM 40 MG: 40 TABLET, DELAYED RELEASE ORAL at 05:03

## 2025-04-24 RX ADMIN — LEVOTHYROXINE SODIUM 50 MCG: 0.05 TABLET ORAL at 05:03

## 2025-04-24 RX ADMIN — DEXAMETHASONE 2 MG: 2 TABLET ORAL at 16:26

## 2025-04-24 RX ADMIN — GABAPENTIN 100 MG: 100 CAPSULE ORAL at 09:36

## 2025-04-24 RX ADMIN — Medication 3 MG: at 21:34

## 2025-04-24 RX ADMIN — DEXAMETHASONE 2 MG: 2 TABLET ORAL at 09:37

## 2025-04-24 RX ADMIN — GABAPENTIN 100 MG: 100 CAPSULE ORAL at 13:17

## 2025-04-24 RX ADMIN — ACETAMINOPHEN 975 MG: 325 TABLET, FILM COATED ORAL at 05:02

## 2025-04-24 RX ADMIN — SENNOSIDES 8.6 MG: 8.6 TABLET, FILM COATED ORAL at 09:37

## 2025-04-24 RX ADMIN — ASPIRIN 81 MG CHEWABLE TABLET 81 MG: 81 TABLET CHEWABLE at 09:36

## 2025-04-24 RX ADMIN — LEVETIRACETAM 1000 MG: 500 TABLET, FILM COATED ORAL at 09:36

## 2025-04-24 NOTE — ASSESSMENT & PLAN NOTE
Monitor closely for DVT/PE as is high risk off AC and stage 4 cancer  Pharm VTE prophy and aspirin if cleared by neuro  SCDs, mobilize

## 2025-04-24 NOTE — ASSESSMENT & PLAN NOTE
Presents with left upper and lower extremities weakness for the last week, dropping small objects, 1 reported fall  On physical exam strength is 3/5 on the left side in upper and lower extremities, weakened left hand   No facial asymmetry, no dysphagia, normal coordination tests.   Reports daily nose bleeds for several minutes since she was started on Xarelto  Pt is taking Decadron 2 mg BID at home and Keppra 1000 mg BID  S/p Chemo, s/p radiation, s/p immunotherapy (see adenocarcinoma problem)  S/p stereotactic brain radiation in Aug 17947 and Jan 2025, follow up natan MRI from 3/18/25 suggested improvement of vasogenic edema  CTA head and neck from 4/21/25 : Vasogenic edema again demonstrated involving the left parietal and left occipital lobes, similar when compared to recent brain MRI. Small 5 mm hyperdense focus noted within the left parietal occipital lobe, which likely represents a small focus of hemorrhage within the dominant lesion in this region on prior brain MRI.   MRI brain : Stable appearance of the treated left brain metastases. No new metastases. Small foci of right frontal diffusion signal most compatible with subacute infarcts.   ECHO wnl, EF 75 %, no thrombus or clots revealed    Plan:    Pt is cleared for discharge from neurologic stand point with neurology follow up.  C/w Asa 81 mg on Discharge. Statin benefits is questionable in settings of guarded prognosis. We'll leave to discretion of the primary team.  Follow  up with neurology in 2-4 weeks in outpatient settings with repeat MRI in about a month.   Continue Decadron to 2 mg BID  C/w Keppra 1000 mg BID  Continue holding Xarelto on discharge  Prognosis remains guarded

## 2025-04-24 NOTE — ASSESSMENT & PLAN NOTE
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  Denies other neurological abnormalities  On Decadron 2 mg twice daily at home  CTA head and neck showed vasogenic edema involving the left parietal and left occipital lobe similar compared to the recent brain MRI.  Small 5 mm hyperdense focus noted within the left parietal occipital lobe which corresponds to susceptibility and likely represents a small focus of hemorrhage within the dominant lesion in this region on prior brain MRI.  Re-demonstrated central necrotic mass involving paramediastinal anterior right upper lobe with surrounding scarring/fibrosis  MRI brain showing stable appearance of treated left brain metastases without any new metastases.  There is a small foci of right frontal diffusion signal most compatible with subacute infarcts  Echocardiogram showing EF 75%    Plan  Neurology has been consulted, appreciate recommendations  Home Xarelto held and aspirin 81 mg started to avoid risk of hemorrhage.  Continue aspirin upon discharge and discontinue Xarelto  Frequent neurochecks  Stroke pathway with hemoglobin A1c and lipid panel.  Added Lipitor 40 mg given elevated LDL  Continue home dose of Decadron 2 mg twice daily  Continue keppra  Continue follow-up with neurology in the outpatient setting

## 2025-04-24 NOTE — ASSESSMENT & PLAN NOTE
Episode of nonsustained V. tach 4/23 morning.  Patient asymptomatic at this time.  Potassium and magnesium within normal range.

## 2025-04-24 NOTE — ASSESSMENT & PLAN NOTE
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  Denies other neurological abnormalities  On Decadron 2 mg twice daily at home  CTA head and neck showed vasogenic edema involving the left parietal and left occipital lobe similar compared to the recent brain MRI.  Small 5 mm hyperdense focus noted within the left parietal occipital lobe which corresponds to susceptibility and likely represents a small focus of hemorrhage within the dominant lesion in this region on prior brain MRI.  Re-demonstrated central necrotic mass involving paramediastinal anterior right upper lobe with surrounding scarring/fibrosis  MRI brain showing stable appearance of treated left brain metastases without any new metastases.  There is a small foci of right frontal diffusion signal most compatible with subacute infarcts  Echo showing ejection fraction 75%.    Plan  Neurology has been consulted, appreciate recommendations  Home Xarelto held and aspirin 81 mg started to avoid risk of hemorrhage.  Continue aspirin upon discharge and discontinue Xarelto  Frequent neurochecks  Stroke pathway with hemoglobin A1c and lipid panel. Added lipitor  Continue home dose of Decadron 2 mg twice daily  Continue keppra  Continue follow-up with neurology in the outpatient setting

## 2025-04-24 NOTE — PROGRESS NOTES
Progress Note - Hospitalist   Name: Ayla Nye 74 y.o. female I MRN: [id number]  Unit/Bed#: S -01 I Date of Admission: 4/21/2025   Date of Service: 4/24/2025 I Hospital Day: 3    Assessment & Plan  Worsening weakness of left upper extremity  History of stage IV adenocarcinoma on chemotherapy with mets to brain status post radiation  Baseline left upper and lower extremity weakness  Presents with worsening left upper extremity weakness.  Denies other neurological abnormalities  On Decadron 2 mg twice daily at home  CTA head and neck showed vasogenic edema involving the left parietal and left occipital lobe similar compared to the recent brain MRI.  Small 5 mm hyperdense focus noted within the left parietal occipital lobe which corresponds to susceptibility and likely represents a small focus of hemorrhage within the dominant lesion in this region on prior brain MRI.  Re-demonstrated central necrotic mass involving paramediastinal anterior right upper lobe with surrounding scarring/fibrosis  MRI brain showing stable appearance of treated left brain metastases without any new metastases.  There is a small foci of right frontal diffusion signal most compatible with subacute infarcts  Echocardiogram showing EF 75%    Plan  Neurology has been consulted, appreciate recommendations  Home Xarelto held and aspirin 81 mg started to avoid risk of hemorrhage.  Continue aspirin upon discharge and discontinue Xarelto  Frequent neurochecks  Stroke pathway with hemoglobin A1c and lipid panel.  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 Castro and radiation oncology  S/p radiation therapy from July to September, was on Keytruda which was  stopped secondary to pneumonitis, on tapering dose of Decadron  Has had stereotactic radiation to brain twice-last stereotactic radiation in January 2025  Currently on 2 mg Decadron twice daily  Discuss with primary oncologist in the outpatient setting to reevaluate timing of systemic therapy  Anemia  History of anemia and history of vitamin B12 deficiency   Hemoglobin baseline 8.7-9 .5  Continue to monitor for any signs of bleeding  Hypertension  History of hypertension  On amlodipine 5 mg daily  Blood Pressure goal-normotensive  As needed IV labetalol ordered  History of pulmonary embolism  Patient has had history of pulmonary embolism  On Xarelto 10 mg daily prior to admission  Xarelto currently held, aspirin started for prior concerns of hemorrhage.  MRI showing subacute infarcts.  Patient is at high risk of PE, risks versus benefits discussion.  Patient and daughter agreeable to continuing aspirin instead of Xarelto  Hypothyroidism  On levothyroxine 50 mcg daily  NSVT (nonsustained ventricular tachycardia) (HCC)  Episode of nonsustained V. tach 4/23 morning.  Patient asymptomatic at this time.  Potassium and magnesium within normal range.    VTE Pharmacologic Prophylaxis: VTE Score: 7 SCDs, holding Xarelto in the setting of microhemorrhage    Mobility:   Basic Mobility Inpatient Raw Score: 21  JH-HLM Goal: 6: Walk 10 steps or more  JH-HLM Achieved: 7: Walk 25 feet or more  JH-HLM Goal achieved. Continue to encourage appropriate mobility.    Patient Centered Rounds: I performed bedside rounds with nursing staff today.     Education and Discussions with Family / Patient: Updated  (daughter) at bedside.    Current Length of Stay: 3 day(s)  Current Patient Status: Inpatient   Certification Statement: Pending rehab placement  Discharge Plan: Anticipate discharge in 24-48 hrs to rehab facility.    Code Status: Level 1 - Full Code    Subjective   Today patient reports doing well.  She denies any falls  yesterday, she just said that she had trouble getting off the toilet.  She denies any syncopal episodes.  No chest pain, lightheadedness, dizziness, headache, worsening weakness, numbness, shortness of breath.  She is tolerating p.o. intake well and is eager to go home.    Objective :  Temp:  [97.7 °F (36.5 °C)-98.1 °F (36.7 °C)] 98.1 °F (36.7 °C)  HR:  [71-86] 86  BP: (119-142)/(66-85) 142/81  Resp:  [18-19] 19  SpO2:  [92 %-95 %] 94 %  O2 Device: None (Room air)    Body mass index is 21.37 kg/m².     Input and Output Summary (last 24 hours):     Intake/Output Summary (Last 24 hours) at 4/24/2025 1334  Last data filed at 4/24/2025 0900  Gross per 24 hour   Intake 240 ml   Output 250 ml   Net -10 ml       Physical Exam  Vitals reviewed.   Constitutional:       Appearance: She is not diaphoretic.   HENT:      Head: Normocephalic and atraumatic.      Nose: No congestion.   Eyes:      Conjunctiva/sclera: Conjunctivae normal.   Cardiovascular:      Rate and Rhythm: Normal rate and regular rhythm.   Pulmonary:      Effort: Pulmonary effort is normal.      Breath sounds: Normal breath sounds.   Abdominal:      Palpations: Abdomen is soft.      Tenderness: There is no abdominal tenderness. There is no guarding or rebound.   Musculoskeletal:      Right lower leg: No edema.      Left lower leg: No edema.   Skin:     General: Skin is warm and dry.   Neurological:      Mental Status: She is alert. Mental status is at baseline.               Lab Results: I have reviewed the following results:   Results from last 7 days   Lab Units 04/24/25  0502   WBC Thousand/uL 10.21*   HEMOGLOBIN g/dL 9.1*   HEMATOCRIT % 29.8*   PLATELETS Thousands/uL 411*   LYMPHO PCT % 4*   MONO PCT % 4   EOS PCT % 0     Results from last 7 days   Lab Units 04/24/25  0502   SODIUM mmol/L 139   POTASSIUM mmol/L 4.3   CHLORIDE mmol/L 106   CO2 mmol/L 23   BUN mg/dL 21   CREATININE mg/dL 0.81   ANION GAP mmol/L 10   CALCIUM mg/dL 9.0   GLUCOSE RANDOM mg/dL 82      Results from last 7 days   Lab Units 04/21/25  1026   INR  1.29*         Results from last 7 days   Lab Units 04/23/25  0444   HEMOGLOBIN A1C % 5.6           Recent Cultures (last 7 days):               Last 24 Hours Medication List:     Current Facility-Administered Medications:     acetaminophen (TYLENOL) tablet 975 mg, Q8H KRISS    aluminum-magnesium hydroxide-simethicone (MAALOX) oral suspension 30 mL, Q4H PRN    amLODIPine (NORVASC) tablet 5 mg, Daily    aspirin chewable tablet 81 mg, Daily    atorvastatin (LIPITOR) tablet 40 mg, Daily With Dinner    cyanocobalamin (VITAMIN B-12) tablet 1,000 mcg, Daily    dexamethasone (DECADRON) tablet 2 mg, BID With Meals    gabapentin (NEURONTIN) capsule 100 mg, BID (AM & Afternoon)    gabapentin (NEURONTIN) capsule 300 mg, HS    HYDROmorphone (DILAUDID) tablet 2 mg, Q4H PRN    labetalol (NORMODYNE) injection 10 mg, Q6H PRN    levETIRAcetam (KEPPRA) tablet 1,000 mg, BID    levothyroxine tablet 50 mcg, Early Morning    melatonin tablet 3 mg, HS    ondansetron (ZOFRAN-ODT) dispersible tablet 4 mg, Q6H PRN    pantoprazole (PROTONIX) EC tablet 40 mg, Daily Before Breakfast    senna (SENOKOT) tablet 8.6 mg, Daily    sulfamethoxazole-trimethoprim (BACTRIM DS) 800-160 mg per tablet 1 tablet, Once per day on Monday Wednesday Friday    Administrative Statements   Today, Patient Was Seen By: Jaci Chu MD      **Please Note: This note may have been constructed using a voice recognition system.**

## 2025-04-24 NOTE — CONSULTS
"CONSULTATION - PMR   Name: Ayla Nye 74 y.o. female I MRN: [id number]  Unit/Bed#: S -01 I Date of Admission: 4/21/2025   Date of Service: 4/24/2025 I Hospital Day: 3     Referred by:  Jaci Chu MD   For:  Weakness of LUE  Assessment & Plan  Left-sided weakness  LUE>LLE weakness worsening recently  - Overall work-up per neuro  - Per neuro \"MRI brain with known brain mets with vasogenic edema, hemorrhagic metastatic lesion, small DWI lesion concerning for subacute stroke or possible small met lesion. \"  - MRI brain showing stable L brain mets without new mets but small foci of R frontal diffusion signal most compatible with subacute infarcts   - CTH Vasogenic edema again demonstrated involving the left parietal and left occipital lobes, similar when compared to recent brain MRI. Small 5 mm hyperdense focus noted within the left parietal occipital lobe, which likely represents a small focus of hemorrhage within the dominant lesion in this region on prior brain MRI.   - CTA No large vessel occlusion, high-grade stenosis, or intracranial aneurysm identified on CT angiogram of the head.   - Relevant hx: Hx of metastatic lung CA s/p XRT on chemo  - P/w: Increased L sided weakness/fall   - Status and residual impairments:    L sided weakness, impaired balance, coordination    Impaired cognition and safety awareness  - Med/surgical management:   Hold AC   Asp qday   Statin at discretion of neuro/primary    Optimal BP control   Monitor neuro exam closely   CA mgmt per H-o  - Neuropsych Med review:    Decadron 2mg BID   Gabapentin 100mg, 100mg, 300mg    Keppra 1g BID     - Continue PT, OT in acute setting to improve ADLs, mobility, strength, conditioning, and decrease risks of immobility as well as to assist with dispo recommendations   - If available in acute setting, recommend cognitive therapy with SLP and OT along with SLP/OT in acute rehab setting  - Recommend MOCA in rehab setting or prior if going " directly home   - Optimal bowel/bladder mgmt/hygiene - monitor for retention, incontinence, infection     - Supportive counseling and updates to family (as appropriate) - counseling and education provided   - Fall precautions - if needed increase rounding or consider virtual sitter or in-person sitter  - Overall mgmt per primary team currently, recommend optimal mgmt during rehab process as well  - Remains at risk for increased confusion/delirium, restlessness, agitation, and fall - continue to monitor for concerns/changes  - Monitor neuro-exam, wakefulness, mood, cognition, insight into deficits and safety awareness   - Monitor and ensure optimal management electrolytes, nutrition, and hydration  - Monitor for signs or symptoms of infection, medication intolerances, other systemic etiologies  - Additional labs, imaging, specialist follow-up as needed per primary team currently   - Overstimulation precautions, frequent re-orientation, re-direction, re-assurance  - Optimal mood, pain, and sleep management  - If impaired sleep or behavior recommend sleep log and agitation monitoring    - Limit sedating medications when possible  - For routine restlessness, anxiety, irritability focus on non-pharmacologic management    - Hold benzo's with increased risk paradoxical reaction and possibility of limiting cognitive recovery    Ayla Nye was evaluated by PT, OT and now by PMR physician and found to have new and significant deficits in ADLs and mobility and potentially cognition.      Prior Level of Function and Social history:    Patient lives ALONE in 2  with few HARRY without bed/bathroom on 1st floor but commode as needed.  Her bed/bath is on 2nd floor.  She has HHA 2-7p daily and helps her up get set-up upstairs at end of night; Her ex- stops over almost daily as well   Mod indep with most ADLs and mobility  Does not drive    Current Level of Function:    Transfers supervision  Ambulation 65 ft CS  Stairs  "min assist  AM-PAC 3 dressing, bathing, toileting    AM-PAC (Activity Measure of Post-Acute Care) - \"6 Clicks\" - is a standardized assessment tool used in IP rehab and acute care settings to measure a patient's functional mobility, daily activity, and cognition and helps guide discharge planning and therapy needs. Scale (1-4)  1 - Total assist (including 2 person assist)    2 - Mod to Max assist (significant assistance)  3 - Min assist to supervision   4 - Independent or mod independent     Disposition recommendation:  SNF  - The patient is too high functioning to qualify for acute rehab at this time; she nevertheless would benefit from additional IP PT/OT in subacute setting unless she has more support at home (24-7)    Hypertension  - Current management at discretion of primary team   - Ensure optimal management during rehab process  - Monitor vitals with and without activity; monitor for orthostasis  - Monitor hemoglobin, electrolytes, kidney function, hydration status   - Current meds: per other providers    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)  - Monitor vitals, labs, exam  - Mgmt per H-O  - Monitor for metastatic cancer related complications closely with low threshold for new imaging and labs    - Monitor for new or worsening pain, decreased ROM  - Monitor of changes in strength, sensation, balance, and mentation   - Monitor for increased fatigue, SOB, edema   - Monitor for abdominal pain, nausea, vomiting, constipation, wt loss, worsening intake/appetite     Hypothyroidism    Anemia    History of pulmonary embolism  Monitor closely for DVT/PE as is high risk off AC and stage 4 cancer  Pharm VTE prophy and aspirin if cleared by neuro  SCDs, mobilize   NSVT (nonsustained ventricular tachycardia) ([facility])      Encounter for skin care  - Increased risk of skin wounds/breakdown/rashes due to recent immobility and co-morbidities   - Turn patient Q2H in bed (use pillows or wedges), encourage wt " shifts every 10-15 minutes in chair  - Patient and if available caregiver education   - Hydragaurd (or other appropriate barrier cream) to buttocks and sacrum BID & PRN   - Allevyn foam to heels and/or float heels when in bed   - Optimal bowel/bladder hygiene; keep skin clean and dry   - EHOB waffle cushion to chair/WC when OOB  - Rec nursing to document in chart and Notify MD if buttock, sacrum, heel, or other skin site develops erythema, skin breakdown, or rashes as soon as possible.  If patient is soiling themselves with urine or stool notify MD.  If you are unable to maintain skin integrity and prevent erythema due to frequency of soiling notify MD as soon as possible as well  - Consult would care for any wounds or significant concerns for skin integrity     VTE prophylaxis   As outlined by primary team      Other medical issues:     As per primary service (and relevant consultants if applicable)    ==================================================================    Chief Complaint:  L sided weakness     History of Present Illness:   Ayla Nye is a 74 y.o. female with PMH of Stage IV lung adenocarcinoma with significant lung masses, right renal mass, and right ischiorectal fossa mass as well as brain metastasis s/p XRT who was receiving second Line metastatic single agent Taoxtere 75 mg/m2 due to toxicities/multiple hospital admissions on other regimen.  She received C1 on 4/4 with plan for C2 on 4/24 however patient developed increased L sided weakness and fall and presented to hospital where  MRI brain showing stable L brain mets without new mets but small foci of R frontal diffusion signal most compatible with subacute infarcts   CTH Vasogenic edema again demonstrated involving the left parietal and left occipital lobes, similar when compared to recent brain MRI. Small 5 mm hyperdense focus noted within the left parietal occipital lobe, which likely represents a small focus of hemorrhage within the  dominant lesion in this region on prior brain MRI.  CTA No large vessel occlusion, high-grade stenosis, or intracranial aneurysm identified on CT angiogram of the head.     Neuro consulted and noted MRI brain with known brain mets with vasogenic edema, hemorrhagic metastatic lesion, small DWI lesion concerning for subacute stroke or possible small met lesion with recommendation to hold AC and start aspirin with OP follow-up.      On eval, patient reports feeling ok with still some worse L sided weakness, imbalance, and incoordination.  She denies pain, SOB, or other new complaints.      Review of Systems:     Complete review of systems obtained.    Please see HPI for details with other significant symptoms or history listed here:    Otherwise, 14 point review of systems completed and was otherwise unremarkable.    Allergies   Allergen Reactions    Doxycycline Headache    Keflex [Cephalexin] Rash    Neomycin-Polymyxin-Dexameth Eye Swelling       Current Facility-Administered Medications:     acetaminophen (TYLENOL) tablet 975 mg, 975 mg, Oral, Q8H KRISS, Naila Olivarez MD, 975 mg at 04/24/25 1317    aluminum-magnesium hydroxide-simethicone (MAALOX) oral suspension 30 mL, 30 mL, Oral, Q4H PRN, Jaci Chu MD, 30 mL at 04/22/25 1532    amLODIPine (NORVASC) tablet 5 mg, 5 mg, Oral, Daily, Naila Olivarez MD, 5 mg at 04/24/25 0937    aspirin chewable tablet 81 mg, 81 mg, Oral, Daily, Elizabeth Castelan MD, 81 mg at 04/24/25 0936    atorvastatin (LIPITOR) tablet 40 mg, 40 mg, Oral, Daily With Dinner, Jaci Chu MD, 40 mg at 04/24/25 1626    cyanocobalamin (VITAMIN B-12) tablet 1,000 mcg, 1,000 mcg, Oral, Daily, Naila Olivarez MD, 1,000 mcg at 04/24/25 0937    dexamethasone (DECADRON) tablet 2 mg, 2 mg, Oral, BID With Meals, Naila Olivarez MD, 2 mg at 04/24/25 1626    gabapentin (NEURONTIN) capsule 100 mg, 100 mg, Oral, BID (AM & Afternoon), Naila Jiménez  MD Juanis, 100 mg at 04/24/25 1317    gabapentin (NEURONTIN) capsule 300 mg, 300 mg, Oral, HS, Naila Olivarez MD, 300 mg at 04/23/25 2035    HYDROmorphone (DILAUDID) tablet 2 mg, 2 mg, Oral, Q4H PRN, Naila Olivarez MD, 2 mg at 04/23/25 1757    labetalol (NORMODYNE) injection 10 mg, 10 mg, Intravenous, Q6H PRN, Naila Olivarez MD, 10 mg at 04/21/25 1824    levETIRAcetam (KEPPRA) tablet 1,000 mg, 1,000 mg, Oral, BID, Naila Olivarez MD, 1,000 mg at 04/24/25 0936    levothyroxine tablet 50 mcg, 50 mcg, Oral, Early Morning, Naila Olivarez MD, 50 mcg at 04/24/25 0503    melatonin tablet 3 mg, 3 mg, Oral, HS, Marsha Miller MD, 3 mg at 04/23/25 2036    ondansetron (ZOFRAN-ODT) dispersible tablet 4 mg, 4 mg, Oral, Q6H PRN, Naila Olivarez MD    pantoprazole (PROTONIX) EC tablet 40 mg, 40 mg, Oral, Daily Before Breakfast, Naila Olivarez MD, 40 mg at 04/24/25 0503    senna (SENOKOT) tablet 8.6 mg, 1 tablet, Oral, Daily, Naila Olivarez MD, 8.6 mg at 04/24/25 0937    sulfamethoxazole-trimethoprim (BACTRIM DS) 800-160 mg per tablet 1 tablet, 1 tablet, Oral, Once per day on Monday Wednesday Friday, Naila Olivarez MD, 1 tablet at 04/23/25 0812    Past Medical History:   Diagnosis Date    DILLON (acute kidney injury) (HCC) 02/14/2025    Allergic 2022    Allergic to neomiacin and cephalexin    Cancer (HCC)     lung cancer    Cancer (HCC)     mets to brain    Cancer (HCC)     perianal mass    Cataract Nov. 2023    Due to have durgery June 2024    Essential thrombocytosis (HCC)     controlled with medication    History of transfusion     Hyperlipidemia 2015    Hypertension 2011    Mass in chest     Nodular goiter     Osteoporosis     Peripheral neuropathy     Pneumonia Oct 16, 2023    Also  Feb 2024    Psoriasis     SIRS (systemic inflammatory response syndrome) (HCC) 06/22/2024       Past Surgical  History:   Procedure Laterality Date    APPENDECTOMY      BREAST CYST EXCISION Right     COLONOSCOPY  10/31/2018    DXA PROCEDURE (HISTORICAL)  2017    IR BIOPSY OTHER  2024    IR LUMBAR PUNCTURE  2024    MAMMO (HISTORICAL)      18    MAMMO NEEDLE LOCALIZATION RIGHT (ALL INC) Right 2009    SKIN LESION EXCISION      bridge of nose      Family History   Problem Relation Age of Onset    Stroke Mother     Depression Mother             Hypertension Mother     Hearing loss Mother     Tuberculosis Father     Cancer Brother         lung    Tuberculosis Brother     Coronary artery disease Brother     Hypertension Brother     Heart disease Brother     No Known Problems Daughter     No Known Problems Daughter     No Known Problems Maternal Grandmother     No Known Problems Maternal Grandfather     No Known Problems Paternal Grandmother     No Known Problems Paternal Grandfather     No Known Problems Maternal Aunt     No Known Problems Maternal Aunt     No Known Problems Maternal Aunt     No Known Problems Maternal Aunt     No Known Problems Paternal Aunt     Cancer Brother         Throat cancer             Physical Examination:   Temp:  [97.7 °F (36.5 °C)-98.6 °F (37 °C)] 98.6 °F (37 °C)  HR:  [71-86] 84  BP: (110-142)/(72-85) 110/72  Resp:  [16-19] 16  SpO2:  [92 %-95 %] 92 %  General: Awake, alert in NAD  HENT: NCAT, MMM  Respiratory: Unlabored breathing  Cardiovascular: Regular rate   Gastrointestinal: Soft, non-distended  Genitourinary: No ruelas  SkiN/MSK/Extremities:  No calf edema, no calf tenderness to palpation  Neurologic/Psych:   MENTAL STATUS: awake, oriented to person, place, time, and largely to situation; some impaired safety awareness and problem solving   Affect: Frustrated at times   CN II-XII: grossly intact   Strength/MMT:  L side 4- to 4/5, R side 5/5; FTN impaired on L   Mild gait instability     Data:  Lab Results   Component Value Date    HGB 9.1 (L) 2025     HCT 29.8 (L) 04/24/2025    WBC 10.21 (H) 04/24/2025    K 4.3 04/24/2025     04/24/2025    CREATININE 0.81 04/24/2025    BUN 21 04/24/2025         XR spine lumbar 2 or 3 views injury  Result Date: 4/23/2025  Impression: No acute osseous abnormality. Computerized Assisted Algorithm (CAA) may have been used to analyze all applicable images. Workstation performed: [id number]     MRI brain w wo contrast  Result Date: 4/22/2025  Impression: Stable appearance of the treated left brain metastases. No new metastases. Small foci of right frontal diffusion signal most compatible with subacute infarcts. Other stable findings above. Workstation performed: [id number]     CTA head and neck with and without contrast  Result Date: 4/21/2025  Impression: CT Brain: Vasogenic edema again demonstrated involving the left parietal and left occipital lobes, similar when compared to recent brain MRI. Small 5 mm hyperdense focus noted within the left parietal occipital lobe, which corresponds to susceptibility and likely represents a small focus of hemorrhage within the dominant lesion in this region on prior brain MRI. The known intracranial metastatic lesions are otherwise difficult to visualize on this study. CT Angiography: No large vessel occlusion, high-grade stenosis, or intracranial aneurysm identified on CT angiogram of the head. No hemodynamically significant stenosis or dissection identified on CT angiogram of the neck. Centrally necrotic mass again demonstrated involving the paramediastinal anterior right upper lobe, with surrounding scarring/fibrosis. Workstation performed: [id number]       Pertinent labs and imaging reviewed.      Patient seen on date of service listed.    80 minutes or greater spent for this encounter which included a combination of face-to-face time with patient and non-face-to-face time which in part specifically includes management of Brain met, acute brain path, cog impairment, cancer, dispo, safety .   Face-to-face time included extended discussion with patient and ex- regarding current condition, medical history, mood, medical/rehabilitation management, and disposition.  Non face-to-face time included coordination of care with patient's co-managing AP and/or physician(s) thru communication and/or review of their recent documentation as well as reviewing vitals, bowel/bladder function, recent labs, diagnostic imaging, and notes from therapy, CM, and nursing.      Thank you for allowing the PM&R service to participate in the care of this patient. We will continue to follow Ayla Nye's progress with you. Please do not hesitate to call with questions or concerns.    Ciaran Acuña MD, MS  [facility]  Physical Medicine and Rehabilitation  Brain Injury Medicine

## 2025-04-24 NOTE — PLAN OF CARE
Problem: OCCUPATIONAL THERAPY ADULT  Goal: Performs self-care activities at highest level of function for planned discharge setting.  See evaluation for individualized goals.  Description: Treatment Interventions: ADL retraining, Functional transfer training, UE strengthening/ROM, Endurance training, Cognitive reorientation, Patient/family training, Equipment evaluation/education, Compensatory technique education, Energy conservation, Activityengagement (cognitive assessments)          See flowsheet documentation for full assessment, interventions and recommendations.   Note: Limitation: Decreased ADL status, Decreased UE ROM, Decreased UE strength, Decreased Safe judgement during ADL, Decreased cognition, Decreased self-care trans, Decreased high-level ADLs  Prognosis: Good  Assessment: Patient is a 74 y.o. female seen for OT evaluation following admission on 4/21/2025  s/p Weakness of left upper extremity. Please see above for comprehensive list of comorbidities and significant PMHx impacting functional performance. Upon initial evaluation, pt appears to be performing below baseline functional status. Pt requires supervision for UB ADLs, supervision for LB ADLs, supervision for bed mobility, supervision for transfers and supervision for functional mobility household distance with RW. The AM-PAC & Barthel Index outcome tools were used to assist in determining pt safety w/self care /mobility and appropriate d/c recommendations, see above for score. Occupational performance is affected by the following deficits: endurance ,  decreased muscular strength , impaired gross motor coordination, impaired fine motor control, decreased balance , decreased standing tolerance for self care tasks , decreased dynamic balance impacting functional reach, decreased activity tolerance , and impaired judgement and problem solving . Personal/Environmental factors impacting D/C include: (+) Hx of falls , decreased caregiver status ,  steps to enter/navigate the home, decreased initation and engagement, decreased social/family support within the home, Assistance needed for ADL/IADLs, Assistance needed for ADLs and functional mobility, High fall risk , decreased insight toward deficits , decreased recall of precautions , and health management. Supporting factors include: support system available and attitude towards recovery . Patient would benefit from OT services within the acute care setting to maximize level of functional independence in the following areas fall prevention , energy conservation , self-care transfers, bed mobility , functional mobility, formal cognitive evaluation, and ADLs.  From OT standpoint, recommendation at time of D/C would be Level I (Maximum Resource Intensity) .     Rehab Resource Intensity Level, OT: I (Maximum Resource Intensity) (vs level III pending progression towards therapy goals and support availble in the home upon d/c)     Marat Clifton MS OTR/L   [place] Licensure# [id number]

## 2025-04-24 NOTE — PROGRESS NOTES
Progress Note - Neurology   Name: Ayla Nye 74 y.o. female I MRN: [id number]  Unit/Bed#: S -01 I Date of Admission: 4/21/2025   Date of Service: 4/24/2025 I Hospital Day: 3    Assessment & Plan  Worsening weakness of left upper extremity  Presents with left upper and lower extremities weakness for the last week, dropping small objects, 1 reported fall  On physical exam strength is 3/5 on the left side in upper and lower extremities, weakened left hand   No facial asymmetry, no dysphagia, normal coordination tests.   Reports daily nose bleeds for several minutes since she was started on Xarelto  Pt is taking Decadron 2 mg BID at home and Keppra 1000 mg BID  S/p Chemo, s/p radiation, s/p immunotherapy (see adenocarcinoma problem)  S/p stereotactic brain radiation in Aug 63839 and Jan 2025, follow up natan MRI from 3/18/25 suggested improvement of vasogenic edema  CTA head and neck from 4/21/25 : Vasogenic edema again demonstrated involving the left parietal and left occipital lobes, similar when compared to recent brain MRI. Small 5 mm hyperdense focus noted within the left parietal occipital lobe, which likely represents a small focus of hemorrhage within the dominant lesion in this region on prior brain MRI.   MRI brain : Stable appearance of the treated left brain metastases. No new metastases. Small foci of right frontal diffusion signal most compatible with subacute infarcts.   ECHO wnl, EF 75 %, no thrombus or clots revealed    Plan:    Pt is cleared for discharge from neurologic stand point with neurology follow up.  C/w Asa 81 mg on Discharge. Statin benefits is questionable in settings of guarded prognosis. We'll leave to discretion of the primary team.  Follow  up with neurology in 2-4 weeks in outpatient settings with repeat MRI in about a month.   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 Castro, last visit on 4/14/2025  Last chemo with carboplatin and paclitaxel on 1/23/2025  S/p radiation in July to Sep 2024, s/p Keytruda (dc'd due to pneumonitis)  S/p Stereotactic radiation in Aug 2024 and Jan 2025   Plan:  Follow Hem onc consult recs  Hypertension  Maintain normotension, management as per primary team  Hypothyroidism  C/w levothyroxine 50 mcg daily  Anemia  Hgb is stable, management as per primary team  History of pulmonary embolism  On Xarelto  Hold on DC in settings of high risk of bleed    Ayla Nye will need follow up in in 2 weeks with general attending or advance practitioner. She will require a repeat MRI within 4 weeks.      Subjective   Examined pt at the bedside, pt is pleasant, not in acute distress, denies any new complains. Pt states that her left side is still weak however better than on admission. Pt informs that she slipped on the floor from the toilet yesterday however she denies any trauma.     Review of Systems   Constitutional:  Negative for chills and fever.   HENT:  Negative for ear pain and sore throat.    Eyes:  Negative for pain and visual disturbance.   Respiratory:  Negative for cough and shortness of breath.    Cardiovascular:  Negative for chest pain and palpitations.   Gastrointestinal:  Negative for abdominal pain and vomiting.   Genitourinary:  Negative for dysuria and hematuria.   Musculoskeletal:  Negative for arthralgias and back pain.   Skin:  Negative for color change and rash.   Neurological:  Positive for weakness. Negative for seizures and syncope.   All other systems reviewed and are negative.    negative, migraine headaches, syncope, seizures, paralysis, numbness or tingling of hands, numbness or tingling of feet, involuntary movements, tremor, spasticity, developmental delay, cerebral palsy, microcephaly, posturing, generalized seizures, focal seizures, fainting    Objective  :  Temp:  [97.7 °F (36.5 °C)-98.1 °F (36.7 °C)] 98.1 °F (36.7 °C)  HR:  [71-86] 86  BP: (119-142)/(66-85) 142/81  Resp:  [18-19] 19  SpO2:  [92 %-95 %] 94 %  O2 Device: None (Room air)    Physical Exam  Vitals and nursing note reviewed.   Constitutional:       General: She is not in acute distress.     Appearance: She is well-developed. She is not ill-appearing.   HENT:      Head: Normocephalic and atraumatic.   Eyes:      Conjunctiva/sclera: Conjunctivae normal.   Cardiovascular:      Rate and Rhythm: Normal rate and regular rhythm.      Heart sounds: No murmur heard.  Pulmonary:      Effort: Pulmonary effort is normal. No respiratory distress.      Breath sounds: Normal breath sounds.   Abdominal:      Palpations: Abdomen is soft.      Tenderness: There is no abdominal tenderness.   Musculoskeletal:         General: No swelling.      Cervical back: Neck supple.   Skin:     General: Skin is warm and dry.      Capillary Refill: Capillary refill takes less than 2 seconds.   Neurological:      Mental Status: She is alert and oriented to person, place, and time. Mental status is at baseline.      Cranial Nerves: No cranial nerve deficit.      Sensory: No sensory deficit.      Motor: Weakness present.      Coordination: Coordination normal.      Comments: Left sided weakness in the upper and lower extremities 3/5, weak hand    Psychiatric:         Mood and Affect: Mood normal.     Neurological Exam  Mental Status  Alert. Oriented to person, place, and time.  Left sided weakness in the upper and lower extremities 3/5, weak hand .        Lab Results: I have reviewed the following results:        VTE Pharmacologic Prophylaxis: Sequential compression device (Venodyne)  in settings of intrametastatic bleed.

## 2025-04-24 NOTE — ASSESSMENT & PLAN NOTE
"LUE>LLE weakness worsening recently  - Overall work-up per neuro  - Per neuro \"MRI brain with known brain mets with vasogenic edema, hemorrhagic metastatic lesion, small DWI lesion concerning for subacute stroke or possible small met lesion. \"  - MRI brain showing stable L brain mets without new mets but small foci of R frontal diffusion signal most compatible with subacute infarcts   - CTH Vasogenic edema again demonstrated involving the left parietal and left occipital lobes, similar when compared to recent brain MRI. Small 5 mm hyperdense focus noted within the left parietal occipital lobe, which likely represents a small focus of hemorrhage within the dominant lesion in this region on prior brain MRI.   - CTA No large vessel occlusion, high-grade stenosis, or intracranial aneurysm identified on CT angiogram of the head.   - Relevant hx: Hx of metastatic lung CA s/p XRT on chemo  - P/w: Increased L sided weakness/fall   - Status and residual impairments:    L sided weakness, impaired balance, coordination    Impaired cognition and safety awareness  - Med/surgical management:   Hold AC   Asp qday   Statin at discretion of neuro/primary    Optimal BP control   Monitor neuro exam closely   CA mgmt per H-o  - Neuropsych Med review:    Decadron 2mg BID   Gabapentin 100mg, 100mg, 300mg    Keppra 1g BID     - Continue PT, OT in acute setting to improve ADLs, mobility, strength, conditioning, and decrease risks of immobility as well as to assist with dispo recommendations   - If available in acute setting, recommend cognitive therapy with SLP and OT along with SLP/OT in acute rehab setting  - Recommend MOCA in rehab setting or prior if going directly home   - Optimal bowel/bladder mgmt/hygiene - monitor for retention, incontinence, infection     - Supportive counseling and updates to family (as appropriate) - counseling and education provided   - Fall precautions - if needed increase rounding or consider virtual sitter " "or in-person sitter  - Overall mgmt per primary team currently, recommend optimal mgmt during rehab process as well  - Remains at risk for increased confusion/delirium, restlessness, agitation, and fall - continue to monitor for concerns/changes  - Monitor neuro-exam, wakefulness, mood, cognition, insight into deficits and safety awareness   - Monitor and ensure optimal management electrolytes, nutrition, and hydration  - Monitor for signs or symptoms of infection, medication intolerances, other systemic etiologies  - Additional labs, imaging, specialist follow-up as needed per primary team currently   - Overstimulation precautions, frequent re-orientation, re-direction, re-assurance  - Optimal mood, pain, and sleep management  - If impaired sleep or behavior recommend sleep log and agitation monitoring    - Limit sedating medications when possible  - For routine restlessness, anxiety, irritability focus on non-pharmacologic management    - Hold benzo's with increased risk paradoxical reaction and possibility of limiting cognitive recovery    Ayla Nye was evaluated by PT, OT and now by PMR physician and found to have new and significant deficits in ADLs and mobility and potentially cognition.      Prior Level of Function and Social history:    Patient lives ALONE in 2  with few HARRY without bed/bathroom on 1st floor but commode as needed.  Her bed/bath is on 2nd floor.  She has HHA 2-7p daily and helps her up get set-up upstairs at end of night; Her ex- stops over almost daily as well   Mod indep with most ADLs and mobility  Does not drive    Current Level of Function:    Transfers supervision  Ambulation 65 ft CS  Stairs min assist  AM-PAC 3 dressing, bathing, toileting    AM-PAC (Activity Measure of Post-Acute Care) - \"6 Clicks\" - is a standardized assessment tool used in IP rehab and acute care settings to measure a patient's functional mobility, daily activity, and cognition and helps guide " discharge planning and therapy needs. Scale (1-4)  1 - Total assist (including 2 person assist)    2 - Mod to Max assist (significant assistance)  3 - Min assist to supervision   4 - Independent or mod independent     Disposition recommendation:  SNF  - The patient is too high functioning to qualify for acute rehab at this time; she nevertheless would benefit from additional IP PT/OT in subacute setting unless she has more support at home (24-7)

## 2025-04-24 NOTE — PLAN OF CARE
Problem: PHYSICAL THERAPY ADULT  Goal: Performs mobility at highest level of function for planned discharge setting.  See evaluation for individualized goals.  Description: Treatment/Interventions: Functional transfer training, LE strengthening/ROM, Elevations, Therapeutic exercise, Patient/family training, Equipment eval/education, Bed mobility, Gait training, Spoke to nursing, Spoke to case management, OT  Equipment Recommended: Cane       See flowsheet documentation for full assessment, interventions and recommendations.  Note: Prognosis: Good  Problem List: Decreased strength, Decreased endurance, Impaired balance, Decreased mobility, Decreased safety awareness, Impaired vision  Assessment: Pt is a 74 y.o. female seen for PT evaluation s/p admit to [facility] on 4/21/2025. Pt was admitted with a primary dx of: worsening weakness of left upper extremity, metastatic cancer, weakness, left-sided weakness.  PT now consulted for assessment of mobility and d/c needs. Pt with OOB to chair orders.  Pts current comorbidities and personal factors effecting treatment include: TB lung, psoriatic arthritis, HTN, HLD, age-related osteoporosis, metastatic cancer to brain, history of falls. Pts current clinical presentation is Unstable/Unpredictable (high complexity) due to Ongoing medical management for primary dx, Increased reliance on more restrictive AD compared to baseline, Decreased activity tolerance compared to baseline, Fall risk, Increased assistance needed from caregiver at current time, Ongoing telemetry monitoring. Prior to admission, pt was independent without AD. Upon evaluation, pt currently is requiring Supervision for bed mobility; Supervision for transfers and Supervision for ambulation 65 ft w/ no AD. Pt presents at PT eval functioning below baseline and currently w/ overall mobility deficits 2* to: BLE weakness, impaired balance, gait deviations, decreased activity tolerance compared to baseline,  decreased functional mobility tolerance compared to baseline, decreased safety awareness, impaired judgement, fall risk. Pt currently at a fall risk 2* to impairments listed above.  Pt will continue to benefit from skilled acute PT interventions to address stated impairments; to maximize functional mobility; for ongoing pt/ family training; and DME needs. At conclusion of PT session chair alarm engaged, all needs in reach, RN notified of session findings/recommendations, and pt returned back in recliner chair with phone and call bell within reach. Pt denies any further questions at this time. Recommend Level I (Maximum Resource Intensity)  upon hospital D/C.        Rehab Resource Intensity Level, PT: I (Maximum Resource Intensity) (vs III pending progression of mobility status)    See flowsheet documentation for full assessment.

## 2025-04-24 NOTE — ASSESSMENT & PLAN NOTE
- Monitor vitals, labs, exam  - Mgmt per H-O  - Monitor for metastatic cancer related complications closely with low threshold for new imaging and labs    - Monitor for new or worsening pain, decreased ROM  - Monitor of changes in strength, sensation, balance, and mentation   - Monitor for increased fatigue, SOB, edema   - Monitor for abdominal pain, nausea, vomiting, constipation, wt loss, worsening intake/appetite

## 2025-04-24 NOTE — OCCUPATIONAL THERAPY NOTE
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 2023    Due to have durgery June 2024    Essential thrombocytosis (HCC)     controlled with medication    History of transfusion     Hyperlipidemia 2015    Hypertension 2011    Mass in chest     Nodular goiter     Osteoporosis     Peripheral neuropathy     Pneumonia Oct 16, 2023    Also  Feb 2024    Psoriasis     SIRS (systemic inflammatory response syndrome) (HCC) 06/22/2024     Past Surgical History  Past Surgical History:   Procedure Laterality Date    APPENDECTOMY  1954    BREAST CYST EXCISION Right     COLONOSCOPY  10/31/2018    DXA PROCEDURE (HISTORICAL)  04/21/2017    IR BIOPSY OTHER  09/12/2024    IR LUMBAR PUNCTURE  09/12/2024    MAMMO (HISTORICAL)      8-24-18    MAMMO NEEDLE LOCALIZATION RIGHT (ALL INC) Right 07/13/2009    SKIN LESION EXCISION      bridge of nose         04/24/25 0849   OT Last Visit   OT Visit Date 04/24/25   Note Type   Note type Evaluation   Pain Assessment   Pain Assessment Tool 0-10   Pain Score No Pain   Restrictions/Precautions   Weight Bearing Precautions Per Order No   Other Precautions Visual impairment;Cognitive;Chair Alarm;Bed Alarm;Fall Risk  (R eye deficits (peripheral neuropathy),L sided weakness)   Home Living   Type of Home House   Home Layout Two level   Bathroom Shower/Tub Tub/shower unit   Bathroom Toilet  "Standard   Bathroom Equipment Shower chair;Commode;Grab bars in shower;Grab bars around toilet   Bathroom Accessibility Accessible  (via FFOS, utilizes BSC on the first floor)   Home Equipment Cane   Additional Comments No use of AD in the home, admits to furniture surfing, will hold on to family out in the community for increased support.   Prior Function   Level of [place] Independent with ADLs;Independent with functional mobility;Needs assistance with IADLS   Lives With (S)  Alone   Receives Help From Home health  (HHA 7x/week 2-7PM)   IADLs Family/Friend/Other provides medication management;Independent with meal prep;Family/Friend/Other provides transportation  (independent with laundry, HHA assists with IADLs)   Falls in the last 6 months 1 to 4  (x2 falls)   Vocational Retired   Comments Pt is a questionable historian. Reports increased L sided weakness recently. HHA provides SBA on the stairs, when patient is alone she reports often climbing up the stairs on all fours vs sliding down on buttocks.   Lifestyle   Autonomy Pt lives ALONE. Independent with ADLs, assist with IADLs - no use of AD for fnxl mobility. (+) falls and (-)    Reciprocal Relationships Supportive family and HHA   Service to Others Retired   Intrinsic Gratification Spending time outside   General   Additional Pertinent History Admit with LUE weakness. Fall in the bathroom during admission. Imaging (-), no acute injuries. PMH of stage IV adenocarcinoma on chemotherapy, with metastasis to brain status post radiation, hypertension, history of PE, hypothyroidism, PCP pneumonia, neuropathy, hypertension essential thrombocytosis   Family/Caregiver Present No   Subjective   Subjective \"I did not fall, I slide off the toilet\"   ADL   Eating Assistance 5  Supervision/Setup   Grooming Assistance 5  Supervision/Setup   UB Bathing Assistance 5  Supervision/Setup   LB Bathing Assistance 5  Supervision/Setup   UB Dressing Assistance 5  " Supervision/Setup   LB Dressing Assistance 5  Supervision/Setup   Toileting Assistance  5  Supervision/Setup   Additional Comments The above levels of assistance are anticipated based on functional performance deficits with use of clinical judgement.   Bed Mobility   Supine to Sit 5  Supervision   Additional items HOB elevated;Increased time required   Sit to Supine   (NT: OOB to recliner chair)   Additional Comments Mild R sided trunk lean, minimal assist for trunk control during dynamic activites.   Transfers   Sit to Stand 5  Supervision   Additional items Increased time required;Verbal cues   Stand to Sit 5  Supervision   Additional items Increased time required;Verbal cues   Additional Comments No use of AD. Slow transitional movements   Functional Mobility   Functional Mobility 5  Supervision   Additional Comments for fnxl household distance w/o use of AD. + L foot drop noted, worsening w/ fatigue   Balance   Static Sitting Fair -   Dynamic Sitting Poor +   Static Standing Fair -   Dynamic Standing Poor +   Activity Tolerance   Activity Tolerance Patient limited by fatigue   Medical Staff Made Aware Spoke with CM, PT   Nurse Made Aware Spoke with RN pre/post   RUE Assessment   RUE Assessment WFL  (MMT 3+/5 grossly)   LUE Assessment   LUE Assessment X   LUE Strength   LUE Overall Strength Deficits  (residual deficits from CVA however pt report increased weakness: MMT 3-/5 grossly  -- poor  strength)   Hand Function   Gross Motor Coordination   (RUE WFL, LUE impaired w/ bradykinetic movements)   Fine Motor Coordination   (RUE WFL, LUE impaired)   Hand Function Comments Pt is R handed. Reports inc difficulty acheiving and maintaing grasp w/ L hand   Sensation   Light Touch No apparent deficits  (light touch intact)   Vision-Basic Assessment   Patient Visual Report   (chronic R eye peripheral field loss)   Perception   Inattention/Neglect Appears intact   Motor Planning Appears intact   Perseveration Not  present   Cognition   Overall Cognitive Status WFL  (WFL conversationally, questionable higher level deficits)   Arousal/Participation Responsive;Cooperative   Attention Within functional limits   Orientation Level Oriented X4   Memory Decreased recall of precautions;Decreased short term memory   Following Commands Follows one step commands without difficulty   Comments Pt ID via wristband, name and . Pt is pleasent and cooperative. Questionable insight into current limitiations and deficits - may benefit from a formal cognitive evaluation to aide in safe discharge planning   Assessment   Limitation Decreased ADL status;Decreased UE ROM;Decreased UE strength;Decreased Safe judgement during ADL;Decreased cognition;Decreased self-care trans;Decreased high-level ADLs   Prognosis Good   Assessment Patient is a 74 y.o. female seen for OT evaluation following admission on 2025  s/p Weakness of left upper extremity. Please see above for comprehensive list of comorbidities and significant PMHx impacting functional performance. Upon initial evaluation, pt appears to be performing below baseline functional status. Pt requires supervision for UB ADLs, supervision for LB ADLs, supervision for bed mobility, supervision for transfers and supervision for functional mobility household distance with RW. The AM-PAC & Barthel Index outcome tools were used to assist in determining pt safety w/self care /mobility and appropriate d/c recommendations, see above for score. Occupational performance is affected by the following deficits: endurance ,  decreased muscular strength , impaired gross motor coordination, impaired fine motor control, decreased balance , decreased standing tolerance for self care tasks , decreased dynamic balance impacting functional reach, decreased activity tolerance , and impaired judgement and problem solving . Personal/Environmental factors impacting D/C include: (+) Hx of falls , decreased caregiver  status , steps to enter/navigate the home, decreased initation and engagement, decreased social/family support within the home, Assistance needed for ADL/IADLs, Assistance needed for ADLs and functional mobility, High fall risk , decreased insight toward deficits , decreased recall of precautions , and health management. Supporting factors include: support system available and attitude towards recovery . Patient would benefit from OT services within the acute care setting to maximize level of functional independence in the following areas fall prevention , energy conservation , self-care transfers, bed mobility , functional mobility, formal cognitive evaluation, and ADLs.  From OT standpoint, recommendation at time of D/C would be Level I (Maximum Resource Intensity) .   Goals   Patient Goals to go home   LTG Time Frame 10-14   Long Term Goal See below   Plan   Treatment Interventions ADL retraining;Functional transfer training;UE strengthening/ROM;Endurance training;Cognitive reorientation;Patient/family training;Equipment evaluation/education;Compensatory technique education;Energy conservation;Activityengagement  (cognitive assessments)   Goal Expiration Date 05/04/25   OT Treatment Day 0   OT Frequency 3-5x/wk   Discharge Recommendation   Rehab Resource Intensity Level, OT I (Maximum Resource Intensity)  (vs level III pending progression towards therapy goals and support availble in the home upon d/c)   AM-PAC Daily Activity Inpatient   Lower Body Dressing 3   Bathing 3   Toileting 3   Upper Body Dressing 3   Grooming 4   Eating 4   Daily Activity Raw Score 20   Daily Activity Standardized Score (Calc for Raw Score >=11) 42.03   AM-PAC Applied Cognition Inpatient   Following a Speech/Presentation 4   Understanding Ordinary Conversation 4   Taking Medications 3   Remembering Where Things Are Placed or Put Away 3   Remembering List of 4-5 Errands 3   Taking Care of Complicated Tasks 3   Applied Cognition Raw Score  20   Applied Cognition Standardized Score 41.76   Barthel Index   Feeding 5   Bathing 0   Grooming Score 0   Dressing Score 5   Bladder Score 10   Bowels Score 10   Toilet Use Score 5   Transfers (Bed/Chair) Score 10   Mobility (Level Surface) Score 0   Stairs Score 5   Barthel Index Score 50   End of Consult   Education Provided Yes   Patient Position at End of Consult Bedside chair;Bed/Chair alarm activated;All needs within reach   Nurse Communication Nurse aware of consult     GOALS:      -Patient will perform grooming tasks standing at sink with overall Mod I in order to increase overall independence     -Patient will be Mod I with UB dressing using AE and AD as needed in order to increase (I) with ADLs     -Patient will be Mod I with UB bathing using AE and AD as needed in order to increase (I) with ADLs     -Patient will be Mod I with LB dressing with use of AE and AD as needed in order to increase (I) with ADLs     -Patient will be Mod I with LB bathing with use of AE and AD as needed in order to increase (I) with ADLs    -Patient will complete toileting w/ Mod I w/ G hygiene/thoroughness in order to reduce caregiver burden     -Patient will demonstrate Mod I with bed mobility for ability to manage own comfort and initiate OOB tasks.      -Patient will perform functional transfers with Mod I to/from all surfaces using DME as needed in order to increase (I) with functional tasks     -Patient will be Mod I with functional mobility to/from bathroom for increased independence with toileting tasks     -Patient will tolerate therapeutic activities for greater than 30 min, in order to increase tolerance for functional activities.      -Patient will engage in ongoing cognitive assessment in order to assist with safe discharge planning/recommendations.     -Patient will demonstrate standing for 6-8 mins in order to increase active participation in functional activities    -Patient will increase UE/LE MMT strength by  1/2 grade to improve bilateral coordination in ADL/IADL/leisure tasks with supervision.     -Patient will demonstrate 100% carryover UE PROM/AAROM/AROM s/p education on exercise program to improve bilateral coordination in ADL/IADL/leisure tasks with supervision    The patient's raw score on the -PAC Daily Activity Inpatient Short Form is 20. A raw score of greater than or equal to 19 suggests the patient may benefit from discharge to home. HOWEVER, please refer to the recommendation of the Occupational Therapist for safe discharge planning.    This session, pt required and most appropriately benefited from skilled OT/PT co-eval due to decreased activity tolerance. OT and PT goals were addressed separately as seen in documentation    Marta Clifton MS OTR/L   [place] Licensure# [id number]

## 2025-04-24 NOTE — CASE MANAGEMENT
Case Management Assessment & Discharge Planning Note    Patient name Ayla Nye  Location S /S -01 MRN [id number]  : 1950 Date 2025       Current Admission Date: 2025  Current Admission Diagnosis:Worsening weakness of left upper extremity   Patient Active Problem List    Diagnosis Date Noted Date Diagnosed    NSVT (nonsustained ventricular tachycardia) (HCC) 2025     Worsening weakness of left upper extremity 2025     Right loin pain 2025     Complication of chemotherapy/immunotherapy 02/15/2025     GERD (gastroesophageal reflux disease) 2025     Epistaxis 2025     History of Pneumocystis jirovecii pneumonia 2025     IgG deficiency (HCC) 2025     Right kidney mass 2025     History of pulmonary embolism 2024     Dyspnea on exertion 2024     Imbalance 2024     Hyponatremia 2024     Leukocytosis 10/27/2024     Anemia 10/25/2024     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     Palliative care patient 2024     Cancer associated pain 2024     Goals of care, counseling/discussion 2024     Right hip pain 09/10/2024     Altered mental status 2024     Hypothyroidism 2024     Abnormal imaging of central nervous system 2024     Acute metabolic encephalopathy 2024     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) 2024     Brain mass 2024     Metastatic cancer to brain (HCC) 2024     Dehydration 2024     Moderate protein-calorie malnutrition (HCC) 2024     Bilateral leg pain 2024     Insomnia 2024     Metastatic adenocarcinoma (HCC) 2024     Age-related osteoporosis without current pathological fracture 2023     Encounter for monitoring of hydroxyurea therapy 2023     JAK2 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 2023     Hypertension 08/10/2022     Mixed hyperlipidemia 08/10/2022     Essential  thrombocytosis (HCC) 07/02/2020     TB lung, 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     Psoriatic arthritis (HCC) 09/10/2019       LOS (days): 3  Geometric Mean LOS (GMLOS) (days): 4.5  Days to GMLOS:1.5     OBJECTIVE:    Risk of Unplanned Readmission Score: 55.66         Current admission status: Inpatient       Preferred Pharmacy:   [id number] - [place], [place] - [address] [facility]  [address] [facility] [address]  Phone: [phone] Fax: [phone]    [facility] [place] [address]  [address]  [facility] [address]  Phone: [phone] Fax: [phone]    Primary Care Provider: Rafy Angelo MD    Primary Insurance: MEDICARE  Secondary Insurance: AARP    ASSESSMENT:  Active Health Care Proxies       Juliana Camarena Alternate Health Care Agent - Daughter   Primary Phone: [phone] (Mobile)  Home Phone: [phone]                           Readmission Root Cause  30 Day Readmission: No    Patient Information  Admitted from:: Home  Mental Status: Alert  During Assessment patient was accompanied by: Not accompanied during assessment  Assessment information provided by:: Patient  Primary Caregiver: Self  Support Systems: Home care staff, Daughter, Family members  County of Residence: [place]  What [facility] do you live in?: [facility] entry access options. Select all that apply.: Stairs  Number of steps to enter home.: 6  Type of Current Residence: [address] home  Upon entering residence, is there a bedroom on the main floor (no further steps)?: No  A bedroom is located on the following floor levels of residence (select all that apply):: 2nd Floor  Upon entering residence, is there a bathroom on the main floor (no further steps)?: No  Indicate which floors of current residence have a bathroom (select all the apply):: 2nd Floor  Number of steps to 2nd floor from main floor: One Flight  Living Arrangements: Lives Alone    Activities  of Daily Living Prior to Admission  Functional Status: Independent  Completes ADLs independently?: Yes  Ambulates independently?: Yes  Does patient use assisted devices?: Yes  Assisted Devices (DME) used: Bedside Commode  Does patient currently own DME?: Yes  What DME does the patient currently own?: Bedside Commode  Does patient have a history of Outpatient Therapy (PT/OT)?: No  Does the patient have a history of Short-Term Rehab?: No  Does patient have a history of HHC?: Yes  Does patient currently have HHC?: No    Current Home Health Care  Type of Current Home Care Services: Home health aide    Patient Information Continued  Income Source: Pension/group home  Does patient have prescription coverage?: Yes  Can the patient afford their medications and any related supplies (such as glucometers or test strips)?: Yes  Does patient receive dialysis treatments?: No  Does patient have a history of substance abuse?: No  Does patient have a history of Mental Health Diagnosis?: No         Means of Transportation  [facility]          DISCHARGE DETAILS:    Discharge planning discussed with:: Patient at bedside  Freedom of Choice: Yes     Contacts  Reason/Outcome: Discharge Planning, Continuity of Care, Referral        Other Referral/Resources/Interventions Provided:  Referral Comments: CM met with Pt at bedside to review rehab recommendations.  Pt is agreeable to acute rehab referrals, submitted in Aidin.

## 2025-04-25 VITALS
DIASTOLIC BLOOD PRESSURE: 79 MMHG | RESPIRATION RATE: 16 BRPM | OXYGEN SATURATION: 93 % | SYSTOLIC BLOOD PRESSURE: 134 MMHG | WEIGHT: 116.84 LBS | HEIGHT: 62 IN | TEMPERATURE: 98.3 F | HEART RATE: 85 BPM | BODY MASS INDEX: 21.5 KG/M2

## 2025-04-25 PROCEDURE — 99239 HOSP IP/OBS DSCHRG MGMT >30: CPT | Performed by: INTERNAL MEDICINE

## 2025-04-25 RX ORDER — MAGNESIUM HYDROXIDE/ALUMINUM HYDROXICE/SIMETHICONE 120; 1200; 1200 MG/30ML; MG/30ML; MG/30ML
30 SUSPENSION ORAL EVERY 4 HOURS PRN
Qty: 355 ML | Refills: 0 | Status: SHIPPED | OUTPATIENT
Start: 2025-04-25 | End: 2025-05-25

## 2025-04-25 RX ORDER — ASPIRIN 81 MG/1
81 TABLET, CHEWABLE ORAL DAILY
Qty: 29 TABLET | Refills: 0 | Status: SHIPPED | OUTPATIENT
Start: 2025-04-25 | End: 2025-05-24

## 2025-04-25 RX ORDER — AMOXICILLIN 250 MG
1 CAPSULE ORAL ONCE
Status: COMPLETED | OUTPATIENT
Start: 2025-04-25 | End: 2025-04-25

## 2025-04-25 RX ORDER — ATORVASTATIN CALCIUM 40 MG/1
40 TABLET, FILM COATED ORAL
Qty: 30 TABLET | Refills: 0 | Status: SHIPPED | OUTPATIENT
Start: 2025-04-25

## 2025-04-25 RX ADMIN — LEVETIRACETAM 1000 MG: 500 TABLET, FILM COATED ORAL at 08:43

## 2025-04-25 RX ADMIN — LEVOTHYROXINE SODIUM 50 MCG: 0.05 TABLET ORAL at 05:11

## 2025-04-25 RX ADMIN — SENNOSIDES AND DOCUSATE SODIUM 1 TABLET: 50; 8.6 TABLET ORAL at 08:43

## 2025-04-25 RX ADMIN — GABAPENTIN 100 MG: 100 CAPSULE ORAL at 08:43

## 2025-04-25 RX ADMIN — Medication 1000 MCG: at 08:43

## 2025-04-25 RX ADMIN — ACETAMINOPHEN 975 MG: 325 TABLET, FILM COATED ORAL at 05:11

## 2025-04-25 RX ADMIN — AMLODIPINE BESYLATE 5 MG: 5 TABLET ORAL at 08:43

## 2025-04-25 RX ADMIN — PANTOPRAZOLE SODIUM 40 MG: 40 TABLET, DELAYED RELEASE ORAL at 05:11

## 2025-04-25 RX ADMIN — DEXAMETHASONE 2 MG: 2 TABLET ORAL at 08:43

## 2025-04-25 RX ADMIN — ASPIRIN 81 MG CHEWABLE TABLET 81 MG: 81 TABLET CHEWABLE at 08:43

## 2025-04-25 RX ADMIN — SULFAMETHOXAZOLE AND TRIMETHOPRIM 1 TABLET: 800; 160 TABLET ORAL at 08:43

## 2025-04-25 NOTE — ASSESSMENT & PLAN NOTE
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  Denies other neurological abnormalities  On Decadron 2 mg twice daily at home  CTA head and neck showed vasogenic edema involving the left parietal and left occipital lobe similar compared to the recent brain MRI.  Small 5 mm hyperdense focus noted within the left parietal occipital lobe which corresponds to susceptibility and likely represents a small focus of hemorrhage within the dominant lesion in this region on prior brain MRI.  Re-demonstrated central necrotic mass involving paramediastinal anterior right upper lobe with surrounding scarring/fibrosis  MRI brain showing stable appearance of treated left brain metastases without any new metastases.  There is a small foci of right frontal diffusion signal most compatible with subacute infarcts  Echocardiogram showing EF 75%    Plan  Neurology has been consulted, appreciate recommendations  Home Xarelto held and aspirin 81 mg started to avoid risk of hemorrhage.  Continue aspirin upon discharge and discontinue Xarelto  Frequent neurochecks  Stroke pathway with hemoglobin A1c and lipid panel.  Added Lipitor 40 mg given elevated LDL  Continue home dose of Decadron 2 mg twice daily  Continue keppra  Continue follow-up with neurology in the outpatient setting  Stable for medical discharge today, pending rehab placement. PMR recommend SNF

## 2025-04-25 NOTE — CASE MANAGEMENT
Case Management Discharge Planning Note    Patient name Ayla Nye  Location S /S -01 MRN [id number]  : 1950 Date 2025       Current Admission Date: 2025  Current Admission Diagnosis:Left-sided weakness   Patient Active Problem List    Diagnosis Date Noted Date Diagnosed    NSVT (nonsustained ventricular tachycardia) (HCC) 2025     Left-sided weakness 2025     Right loin pain 2025     Complication of chemotherapy/immunotherapy 02/15/2025     GERD (gastroesophageal reflux disease) 2025     Epistaxis 2025     History of Pneumocystis jirovecii pneumonia 2025     IgG deficiency (HCC) 2025     Right kidney mass 2025     History of pulmonary embolism 2024     Dyspnea on exertion 2024     Imbalance 2024     Hyponatremia 2024     Leukocytosis 10/27/2024     Anemia 10/25/2024     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     Palliative care patient 2024     Cancer associated pain 2024     Goals of care, counseling/discussion 2024     Right hip pain 09/10/2024     Altered mental status 2024     Hypothyroidism 2024     Abnormal imaging of central nervous system 2024     Acute metabolic encephalopathy 2024     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) 2024     Brain mass 2024     Metastatic cancer to brain (HCC) 2024     Dehydration 2024     Moderate protein-calorie malnutrition (HCC) 2024     Bilateral leg pain 2024     Insomnia 2024     Metastatic adenocarcinoma (HCC) 2024     Age-related osteoporosis without current pathological fracture 2023     Encounter for monitoring of hydroxyurea therapy 2023     JAK2 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 2023     Hypertension 08/10/2022     Mixed hyperlipidemia 08/10/2022     Essential thrombocytosis (HCC) 2020     TB lung, 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      Psoriatic arthritis (HCC) 09/10/2019       LOS (days): 4  Geometric Mean LOS (GMLOS) (days): 4.5  Days to GMLOS:0.7     OBJECTIVE:  Risk of Unplanned Readmission Score: 56.02         Current admission status: Inpatient   Preferred Pharmacy:   [id number] - [facility] [place] - [address] [facility]  [address]  [facility] [address]  Phone: [phone] Fax: [phone]    [facility] [place] - [address]  [address]  [facility] [address]  Phone: [phone] Fax: [phone]    Primary Care Provider: Rafy Angelo MD    Primary Insurance: MEDICARE  Secondary Insurance: [facility]    DISCHARGE DETAILS:    Additional Comments: Per [facility] provider, Pt is medically ready for discharge.  Pt was evaluated by PM&R who state that the Pt is too functional for acute rehab and are recommending STR/SNF. CM met with Pt at bedside to review. Pt is adamnat that she does not want to go to STR. Pt is agreeable to [facility] referrals- submitted in Aidin.   Pt states that she does not belive that she needs to go to STR and thinks that she will do better at home w/ home therpay. CM discussed the possibility of increasing her PP caregivers hours. Pt states that this is possible and she will consider if she finds herself needing more help.  Pt states that her ex-  lives 1 mile away and stops by daily to assist her.

## 2025-04-25 NOTE — CASE MANAGEMENT
Case Management Discharge Planning Note    Patient name Ayla Nye  Location S /S -01 MRN [id number]  : 1950 Date 2025       Current Admission Date: 2025  Current Admission Diagnosis:Left-sided weakness   Patient Active Problem List    Diagnosis Date Noted Date Diagnosed    NSVT (nonsustained ventricular tachycardia) (HCC) 2025     Left-sided weakness 2025     Right loin pain 2025     Complication of chemotherapy/immunotherapy 02/15/2025     GERD (gastroesophageal reflux disease) 2025     Epistaxis 2025     History of Pneumocystis jirovecii pneumonia 2025     IgG deficiency (HCC) 2025     Right kidney mass 2025     History of pulmonary embolism 2024     Dyspnea on exertion 2024     Imbalance 2024     Hyponatremia 2024     Leukocytosis 10/27/2024     Anemia 10/25/2024     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     Palliative care patient 2024     Cancer associated pain 2024     Goals of care, counseling/discussion 2024     Right hip pain 09/10/2024     Altered mental status 2024     Hypothyroidism 2024     Abnormal imaging of central nervous system 2024     Acute metabolic encephalopathy 2024     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) 2024     Brain mass 2024     Metastatic cancer to brain (HCC) 2024     Dehydration 2024     Moderate protein-calorie malnutrition (HCC) 2024     Bilateral leg pain 2024     Insomnia 2024     Metastatic adenocarcinoma (HCC) 2024     Age-related osteoporosis without current pathological fracture 2023     Encounter for monitoring of hydroxyurea therapy 2023     JAK2 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 2023     Hypertension 08/10/2022     Mixed hyperlipidemia 08/10/2022     Essential thrombocytosis (HCC) 2020     TB lung, 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      Psoriatic arthritis (HCC) 09/10/2019       LOS (days): 4  Geometric Mean LOS (GMLOS) (days): 4.5  Days to GMLOS:0.6     OBJECTIVE:  Risk of Unplanned Readmission Score: 56.02         Current admission status: Inpatient   Preferred Pharmacy:   [id number] - [place], [place] - [address] [facility]  [address] [facility] [address]  Phone: [phone] Fax: [phone]    [facility] [place] ([place]) [facility] [place] - [address]  [address]  [facility] [address]  Phone: [phone] Fax: [phone]    Primary Care Provider: Rafy Angelo MD    Primary Insurance: MEDICARE  Secondary Insurance: [facility]    DISCHARGE DETAILS:      Requested Home Health Care         Is the patient interested in HHC at discharge?: Yes  Home Health Discipline requested:: Nursing, Occupational Therapy, Physical Therapy  Home Health Agency Name:: [facility] External Referral Reason (only applicable if external HHA name selected): Scheduling access issues  Home Health Follow-Up Provider:: PCP  Home Health Services Needed:: Evaluate Functional Status and Safety, Gait/ADL Training, Strengthening/Theraputic Exercises to Improve Function  Homebound Criteria Met:: Uses an Assist Device (i.e. cane, walker, etc)  Supporting Clincal Findings:: Limited Endurance, Fatigues Easliy in Short Distances         Other Referral/Resources/Interventions Provided:  Referral Comments: [facility] reserved in [place].            IMM Given (Date):: 04/25/25  IMM Given to:: Patient

## 2025-04-25 NOTE — PLAN OF CARE
Problem: Potential for Falls  Goal: Patient will remain free of falls  Description: INTERVENTIONS:- Educate patient/family on patient safety including physical limitations- Instruct patient to call for assistance with activity - Consult OT/PT to assist with strengthening/mobility - Keep Call bell within reach- Keep bed low and locked with side rails adjusted as appropriate- Keep care items and personal belongings within reach- Initiate and maintain comfort rounds- Make Fall Risk Sign visible to staff- Offer Toileting every 2 Hours, in advance of need- Initiate/Maintain bed alarm- Obtain necessary fall risk management equipment: alarm- Apply yellow socks and bracelet for high fall risk patients- Consider moving patient to room near nurses station  INTERVENTIONS:- Educate patient/family on patient safety including physical limitations- Instruct patient to call for assistance with activity - Consult OT/PT to assist with strengthening/mobility - Keep Call bell within reach- Keep bed low and locked with side rails adjusted as appropriate- Keep care items and personal belongings within reach- Initiate and maintain comfort rounds- Make Fall Risk Sign visible to staff- Offer Toileting every 2 Hours, in advance of need- Initiate/Maintain bed alarm- Obtain necessary fall risk management equipment: bed- Apply yellow socks and bracelet for high fall risk patients- Consider moving patient to room near nurses station  Outcome: Progressing     Problem: PAIN - ADULT  Goal: Verbalizes/displays adequate comfort level or baseline comfort level  Description: Interventions:- Encourage patient to monitor pain and request assistance- Assess pain using appropriate pain scale- Administer analgesics based on type and severity of pain and evaluate response- Implement non-pharmacological measures as appropriate and evaluate response- Consider cultural and social influences on pain and pain management- Notify physician/advanced practitioner  if interventions unsuccessful or patient reports new pain  Outcome: Progressing     Problem: INFECTION - ADULT  Goal: Absence or prevention of progression during hospitalization  Description: INTERVENTIONS:- Assess and monitor for signs and symptoms of infection- Monitor lab/diagnostic results- Monitor all insertion sites, i.e. indwelling lines, tubes, and drains- Monitor endotracheal if appropriate and nasal secretions for changes in amount and color- [place] appropriate cooling/warming therapies per order- Administer medications as ordered- Instruct and encourage patient and family to use good hand hygiene technique- Identify and instruct in appropriate isolation precautions for identified infection/condition  Outcome: Progressing  Goal: Absence of fever/infection during neutropenic period  Description: INTERVENTIONS:- Monitor WBC  Outcome: Progressing     Problem: SAFETY ADULT  Goal: Patient will remain free of falls  Description: INTERVENTIONS:- Educate patient/family on patient safety including physical limitations- Instruct patient to call for assistance with activity - Consult OT/PT to assist with strengthening/mobility - Keep Call bell within reach- Keep bed low and locked with side rails adjusted as appropriate- Keep care items and personal belongings within reach- Initiate and maintain comfort rounds- Make Fall Risk Sign visible to staff- Offer Toileting every 2 Hours, in advance of need- Initiate/Maintain alarm- Obtain necessary fall risk management equipment: 2- Apply yellow socks and bracelet for high fall risk patients- Consider moving patient to room near nurses station  INTERVENTIONS:- Educate patient/family on patient safety including physical limitations- Instruct patient to call for assistance with activity - Consult OT/PT to assist with strengthening/mobility - Keep Call bell within reach- Keep bed low and locked with side rails adjusted as appropriate- Keep care items and personal belongings  within reach- Initiate and maintain comfort rounds- Make Fall Risk Sign visible to staff- Offer Toileting every 2 Hours, in advance of need- Initiate/Maintain alarm- Obtain necessary fall risk management equipment: 2- Apply yellow socks and bracelet for high fall risk patients- Consider moving patient to room near nurses station  Outcome: Progressing  Goal: Maintain or return to baseline ADL function  Description: INTERVENTIONS:-  Assess patient's ability to carry out ADLs; assess patient's baseline for ADL function and identify physical deficits which impact ability to perform ADLs (bathing, care of mouth/teeth, toileting, grooming, dressing, etc.)- Assess/evaluate cause of self-care deficits - Assess range of motion- Assess patient's mobility; develop plan if impaired- Assess patient's need for assistive devices and provide as appropriate- Encourage maximum independence but intervene and supervise when necessary- Involve family in performance of ADLs- Assess for home care needs following discharge - Consider OT consult to assist with ADL evaluation and planning for discharge- Provide patient education as appropriate  Outcome: Progressing  Goal: Maintains/Returns to pre admission functional level  Description: INTERVENTIONS:- Perform AM-PAC 6 Click Basic Mobility/ Daily Activity assessment daily.- Set and communicate daily mobility goal to care team and patient/family/caregiver. - Collaborate with rehabilitation services on mobility goals if consulted- Perform Range of Motion 22 times a day.- Reposition patient every 2 hours.- Dangle patient 2 times a day- Stand patient 2 times a day- Ambulate patient 2 times a day- Out of bed to chair 2 times a day - Out of bed for meals 2 times a day- Out of bed for toileting- Record patient progress and toleration of activity level   Outcome: Progressing     Problem: DISCHARGE PLANNING  Goal: Discharge to home or other facility with appropriate resources  Description:  INTERVENTIONS:- Identify barriers to discharge w/patient and caregiver- Arrange for needed discharge resources and transportation as appropriate- Identify discharge learning needs (meds, wound care, etc.)- Arrange for interpretive services to assist at discharge as needed- Refer to Case Management Department for coordinating discharge planning if the patient needs post-hospital services based on physician/advanced practitioner order or complex needs related to functional status, cognitive ability, or social support system  Outcome: Progressing     Problem: Knowledge Deficit  Goal: Patient/family/caregiver demonstrates understanding of disease process, treatment plan, medications, and discharge instructions  Description: Complete learning assessment and assess knowledge base.Interventions:- Provide teaching at level of understanding- Provide teaching via preferred learning methods  Outcome: Progressing     Problem: NEUROSENSORY - ADULT  Goal: Achieves stable or improved neurological status  Description: INTERVENTIONS- Monitor and report changes in neurological status- Monitor vital signs such as temperature, blood pressure, glucose, and any other labs ordered - Initiate measures to prevent increased intracranial pressure- Monitor for seizure activity and implement precautions if appropriate    Outcome: Progressing  Goal: Remains free of injury related to seizures activity  Description: INTERVENTIONS- Maintain airway, patient safety  and administer oxygen as ordered- Monitor patient for seizure activity, document and report duration and description of seizure to physician/advanced practitioner- If seizure occurs,  ensure patient safety during seizure- Reorient patient post seizure- Seizure pads on all 4 side rails- Instruct patient/family to notify RN of any seizure activity including if an aura is experienced- Instruct patient/family to call for assistance with activity based on nursing assessment- Administer  anti-seizure medications if ordered  Outcome: Progressing  Goal: Achieves maximal functionality and self care  Description: INTERVENTIONS- Monitor swallowing and airway patency with patient fatigue and changes in neurological status- Encourage and assist patient to increase activity and self care. - Encourage visually impaired, hearing impaired and aphasic patients to use assistive/communication devices  Outcome: Progressing     Problem: Neurological Deficit  Goal: Neurological status is stable or improving  Description: Interventions:- Monitor and assess patient's level of consciousness, motor function, sensory function, and level of assistance needed for ADLs. - Monitor and report changes from baseline. Collaborate with interdisciplinary team to initiate plan and implement interventions as ordered. - Provide and maintain a safe environment.- Consider seizure precautions.- Consider fall precautions.- Consider aspiration precautions.- Consider bleeding precautions.  Outcome: Progressing     Problem: Activity Intolerance/Impaired Mobility  Goal: Mobility/activity is maintained at optimum level for patient  Description: Interventions:- Assess and monitor patient  barriers to mobility and need for assistive/adaptive devices.- Assess patient's emotional response to limitations.- Collaborate with interdisciplinary team and initiate plans and interventions as ordered.- Encourage independent activity per ability.- Maintain proper body alignment.- Perform active/passive rom as tolerated/ordered.- Plan activities to conserve energy.- Turn patient as appropriate  Outcome: Progressing     Problem: Communication Impairment  Goal: Ability to express needs and understand communication  Description: Assess patient's communication skills and ability to understand information.  Patient will demonstrate use of effective communication techniques, alternative methods of communication and understanding even if not able to speak. -  Encourage communication and provide alternate methods of communication as needed.- Collaborate with case management/ for discharge needs.- Include patient/family/caregiver in decisions related to communication.  Outcome: Progressing     Problem: Potential for Aspiration  Goal: Non-ventilated patient's risk of aspiration is minimized  Description: Assess and monitor vital signs, respiratory status, and labs (WBC).  Monitor for signs of aspiration (tachypnea, cough, rales, wheezing, cyanosis, fever).- Assess and monitor patient's ability to swallow.- Place patient up in chair to eat if possible.- HOB up at 90 degrees to eat if unable to get patient up into chair.- Supervise patient during oral intake. - Instruct patient/ family to take small bites.- Instruct patient/ family to take small single sips when taking liquids.- Follow patient-specific strategies generated by speech pathologist.  Outcome: Progressing  Goal: Ventilated patient's risk of aspiration is minimized  Description: Assess and monitor vital signs, respiratory status, airway cuff pressure, and labs (WBC).  Monitor for signs of aspiration (tachypnea, cough, rales, wheezing, cyanosis, fever).- Elevate head of bed 30 degrees if patient has tube feeding.- Monitor tube feeding.  Outcome: Progressing     Problem: Nutrition  Goal: Nutrition/Hydration status is improving  Description: Monitor and assess patient's nutrition/hydration status for malnutrition (ex- brittle hair, bruises, dry skin, pale skin and conjunctiva, muscle wasting, smooth red tongue, and disorientation). Collaborate with interdisciplinary team and initiate plan and interventions as ordered.  Monitor patient's weight and dietary intake as ordered or per policy. Utilize nutrition screening tool and intervene per policy. Determine patient's food preferences and provide high-protein, high-caloric foods as appropriate. - Assist patient with eating.- Allow adequate time for meals.-  Encourage patient to take dietary supplement as ordered.- Collaborate with clinical nutritionist.- Include patient/family/caregiver in decisions related to nutrition.  Outcome: Progressing

## 2025-04-25 NOTE — PLAN OF CARE
Problem: PAIN - ADULT  Goal: Verbalizes/displays adequate comfort level or baseline comfort level  Description: Interventions:- Encourage patient to monitor pain and request assistance- Assess pain using appropriate pain scale- Administer analgesics based on type and severity of pain and evaluate response- Implement non-pharmacological measures as appropriate and evaluate response- Consider cultural and social influences on pain and pain management- Notify physician/advanced practitioner if interventions unsuccessful or patient reports new pain  Outcome: Progressing     Problem: INFECTION - ADULT  Goal: Absence or prevention of progression during hospitalization  Description: INTERVENTIONS:- Assess and monitor for signs and symptoms of infection- Monitor lab/diagnostic results- Monitor all insertion sites, i.e. indwelling lines, tubes, and drains- Monitor endotracheal if appropriate and nasal secretions for changes in amount and color- [place] appropriate cooling/warming therapies per order- Administer medications as ordered- Instruct and encourage patient and family to use good hand hygiene technique- Identify and instruct in appropriate isolation precautions for identified infection/condition  Outcome: Progressing  Goal: Absence of fever/infection during neutropenic period  Description: INTERVENTIONS:- Monitor WBC  Outcome: Progressing     Problem: SAFETY ADULT  Goal: Patient will remain free of falls  Description: INTERVENTIONS:- Educate patient/family on patient safety including physical limitations- Instruct patient to call for assistance with activity - Consult OT/PT to assist with strengthening/mobility - Keep Call bell within reach- Keep bed low and locked with side rails adjusted as appropriate- Keep care items and personal belongings within reach- Initiate and maintain comfort rounds- Make Fall Risk Sign visible to staff- Offer Toileting every 2 Hours, in advance of need- Initiate/Maintain bed alarm-   Apply yellow socks and bracelet for high fall risk patients- Consider moving patient to room near nurses station  INTERVENTIONS:- Educate patient/family on patient safety including physical limitations- Instruct patient to call for assistance with activity - Consult OT/PT to assist with strengthening/mobility - Keep Call bell within reach- Keep bed low and locked with side rails adjusted as appropriate- Keep care items and personal belongings within reach- Initiate and maintain comfort rounds- Make Fall Risk Sign visible to staff- Offer Toileting every 2 Hours, in advance of need- Initiate/Maintain bed alarm- Apply yellow socks and bracelet for high fall risk patients- Consider moving patient to room near nurses station  Outcome: Progressing  Goal: Maintain or return to baseline ADL function  Description: INTERVENTIONS:-  Assess patient's ability to carry out ADLs; assess patient's baseline for ADL function and identify physical deficits which impact ability to perform ADLs (bathing, care of mouth/teeth, toileting, grooming, dressing, etc.)- Assess/evaluate cause of self-care deficits - Assess range of motion- Assess patient's mobility; develop plan if impaired- Assess patient's need for assistive devices and provide as appropriate- Encourage maximum independence but intervene and supervise when necessary- Involve family in performance of ADLs- Assess for home care needs following discharge - Consider OT consult to assist with ADL evaluation and planning for discharge- Provide patient education as appropriate  Outcome: Progressing  Goal: Maintains/Returns to pre admission functional level  Description: INTERVENTIONS:- Perform AM-PAC 6 Click Basic Mobility/ Daily Activity assessment daily.- Set and communicate daily mobility goal to care team and patient/family/caregiver. - Collaborate with rehabilitation services on mobility goals if consulted- Perform Range of Motion 3 times a day.- Reposition patient every 2  hours.- Dangle patient 3 times a day- Stand patient 3 times a day- Ambulate patient 3 times a day- Out of bed to chair 3 times a day - Out of bed for meals 3 times a day- Out of bed for toileting- Record patient progress and toleration of activity level   Outcome: Progressing

## 2025-04-26 NOTE — PROGRESS NOTES
Name: Ayla Nye      : 1950      MRN: [id number]  Encounter Provider: Rosalinda Castro MD  Encounter Date: 2025   Encounter department: [facility]  :  Assessment & Plan  JAK2 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         Malignant neoplasm of upper lobe, right bronchus or lung (HCC)         Other acute pulmonary embolism, unspecified whether acute cor pulmonale present (HCC)         Brain mass         JAK2 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     Plts 409    Plts 411 as of 2025         Malignant neoplasm of upper lobe, right bronchus or lung (HCC)      C1 2024  C2  2024  C3 2024  C4 2024  C5 2024  C6 2024 at [place] f/b hospitalization for neutropenic fever     First line metastatic treatment     Carboplatin AUC 5 Pemetrexed 500 mg/m2 and Pembrolizumab 200 mg IV every 3 weeks x 4 cycles     C1 held due to hospital admission  C1 held due to radiation and concurrent high-dose steroid use     C1 now 10/7/2024  C2 10/28/2024-held due to admission     C2 2024- completed      No treatment since 2024 due to multiple admissions/toxicities, PCP pneumonia, PE on Xarelto      Second Line metastatic single agent Taoxtere 75 mg/m2     C1 2025     C2 held 2025-will decrease to 60 mg/m2 due to toxicities    C2 will be given 2025 at 60 mg/m2 due to toxicities/potential tolerance           Malignant neoplasm of upper lobe, right bronchus or lung (HCC)     Stage IV metastatic lung cancer, currently on carboplatin/paclitaxel and got radiation therapy from July to September, was on Keytruda which was stopped secondary to pneumonitis, on tapering dose of Decadron,  Noted to have worsening cerebral edema and memory loss 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 Patient with history of brain metastases from lung cancers, s/p SRS in August 2024 and in January 2025. Patient has been on steroids with decadron for many weeks now. Whenever steroids have been tapered to as low as 2 mg daily, patient would have episodes of worsening fatigue and confusion, similar to what was present during this admission.  CT brain showed stable findings with left posterior frontal, parietal, and occipital vasogenic edema with 5 mm midline shift to the right.           1/13/2025     Patient continues with Decadron, will titrate down to 6 mg starting tomorrow.  She is also scheduled to get SRT on 1/16/2025.        Her case was discussed in neuro-oncology board and recommendations were for Avastin due to concerns of radiation induced vasogenic edema as she is not responding to steroids.     Concern with bleeding risk/on Xarelto-will monitor closely     Complaining of worsening mid thoracic back pain radiating under ribs bilaterally-will get CT chest but last imaging 31 Dec without active PE, no bony mets in that area.      Patient has appointment on 1/27/2025, encouraged to keep appointment and will be seen in office postradiation therapy.        Plan is to start her on low-dose Avastin and resume carboplatin and pemetrexed.  Will continue to hold on pembrolizumab as patient continues to be on steroids.     Will dose Avastin at 5 mg/kg to start one week after SBRT     SBRT later this week; 16th Jan      Will monitor closely for bleeding, BP, MS changes     Continue to wean decadron ; concern with AI with rapid taper       Admitted again 2/3-2/4/2025 for balance issue after 1/23/2025 admission for fever/ chills with no source      2/11/2025     To date, had 3 cycles of Carbo/Pemetrexed but was unable to get Avastin as had fever/chills post therapy     Will attempt again to treat 2/13/2025     Carboplatin dose AUC 4 Pemetrexed 300 mg/m2 rather than 500 mg/m2 and acceptable to give with GFR under 40 and will start Avastin and can be given with calculated clearance by Cockcroft and Gault      CrCl per Cockcroft and Gault is 44  GFR is 61per C/G      Both are acceptable to give these agent     Need to monitor urine protein which is more of an issue per the Avastin label the clearance      Monitor urine protein /BP/side effects      Doing better; currently on 3 mg bid decadron and will decrease to 2 mg bid next week-will not decrease further         3/3/2025     Patient is currently reduced dose carboplatin AUC 4, pemetrexed 300 mg/m² and Avastin 5 mg/kg that was added with last cycle she received on 2/13/2025.     Despite dose reduction she did not tolerated therapy very well and was admitted to hospital from 2/14-0/19 for high-grade fever, nausea and generalized weakness.  Infectious workup was negative.     Patient is planned for next treatment on 3/6/2025, however we will hold on upcoming treatment due to ongoing weakness and will reconsider chemotherapy options in about 2 weeks     Will repeat CBC and CMP to rule out cytopenia versus electrolyte abnormalities attributing to her symptoms.     Pending lab work and imaging, will likely consider switching chemotherapy to Taxotere versus further dose reduction in current regimen.     Again has headaches with steroid taper, will go back to 4 mg decadron in am and 2 mg at night     Labs repeated today with WBC 8.3 Hgb 9.3 thus stable and plts 595 diff pending  Will most likely stop this regimen as not tolerating; avastin made her weaker with increased bleeding issues  Increased SOB       Will consider single agent salvage Taxotere      3/17/2025     Has not had treatment since last admission in mid Feb     Too toxic for patient; every treatment has been admitted     Has increased right gluteal pain-will reimage     Last scan months ago, will repeat CT CAP     Complains weakness/dizziness although better     Current deccadron at 2 mg bid and will not decrease; no headahces     Has been using Dilaudid for pain at 2 mg-will increase to 4 mg as states not helpful     Will start second line therapy with Taxotere single agent 75 mg/m2 every 3 weeks      Repeat labs/may need fluids      4/14/2025     S/p C1 taxotere 4/4/2025     Felt significant fatigue     Breathing worse although 93% on RA     Again seen in ER for fever 4/7/2025-low grade fever 99 but then increased to 100.5     No pneumonia, Chest Xray unremarkable      No source, not neutropenic 3 days after chemo      Will repeat labs today/CBC with diff as extreme fatigue     Decreased left lung base on exam-will do Chest Xray     Continues with pain in right hip s/p RT has gluteal mets           Repeat MRI 3/28/2025     Unremarkable right gluteal soft tissues adjacent to the vitamin E marker identifying the region of interest. No evidence of mass, signal abnormality or abnormal post gadolinium enhancement. No suspected metastatic disease throughout the bony pelvis.     Will decrease Taxotere 60 mg/m2 from 75 mg/m2 for C2 which will be 4/24/2025      Remain on decadron 2 mg bid-will try 3 mg daily but if symptomatic, restart at 4 total per day      MRI mid March with less edema      CT CAP 3/21/2025        Anteromedial right upper lobe: 5.0 x 2.5 cm, #70 (previously 2.6 x 1.9 cm). Evidence of central necrosis.  Right lower lobe: 1.2 x 1.0 cm, #153 (previously 0.9 x 0.8 cm)  Left lower lobe: 2.8 x 2.2 cm, #163 (previously 2.6 x 1.8 cm)  Left lower lobe: 0.4 cm, #164 (previously 0.3 cm)  Lingula: 2.9 x 2.1 cm, #163 (previously 2.3 x 1.8 cm)  Left  upper lobe: 0.3 cm, #66, unchanged     Similar emphysema, mild left upper lobe reticular change, areas of linear scar and right paramedian posttreatment fibrosis with volume loss and traction bronchiectasis.     PLEURA: Similar focal left posterior calcified plaque. Otherwise within normal limits.     HEART/GREAT VESSELS: Unchanged mild atherosclerosis and mitral annular calcification. Normal variant left vertebral artery arising from the aortic arch..     MEDIASTINUM AND SUDEEP:  Within normal limits.     CHEST WALL AND LOWER NECK:   Within normal limits.     ABDOMEN     LIVER/BILIARY TREE:  Within normal limits.     GALLBLADDER:  Within normal limits.     SPLEEN:  Within normal limits.     PANCREAS:  Within normal limits.     ADRENAL GLANDS:  Within normal limits.     KIDNEYS/URETERS:  7.1 x 5.6 x 5.5 cm (length X depth X width) ill-defined, hypoenhancing right upper pole mass (previously 6.4 x 4.5 x 5.2 cm). Small calcification.     Several, similar bilateral circumscribed hypodensities too small to definitively characterize, likely cysts.     Unchanged asymmetric right urothelial enhancement.     No hydronephrosis, perinephric fatty stranding or fluid.     STOMACH AND BOWEL:  Unchanged mild colonic diverticulosis.     Normal caliber and wall thickness.     APPENDIX: Not visualized.  No secondary signs of appendicitis.     ABDOMINOPELVIC CAVITY:  No abnormal air, fluid or enlarged lymph nodes.     VESSELS:  Unchanged mild atherosclerosis. Normal aortic caliber.     Right renal vein is patent.     PELVIS:     REPRODUCTIVE ORGANS:  Within normal limits for age.     URINARY BLADDER:  Within normal limits.     ABDOMINAL WALL: 2.9 x 2.1 cm right ischio rectal fossa mass (previously 3.0 x 2.8 cm. 301/227).     BONES: Persistent degenerative changes, scoliosis and mild T5 compression fracture.      IMPRESSION:     Chest:  Compared to 3/11/2024, increased size of pulmonary metastases.     Abdomen and pelvis:  Increased  size of right renal mass compared to 1/23/2025. Metastatic disease versus primary malignancy.     Mildly decreased size of right ischiorectal fossa mass, may be patient's treated squamous cell carcinoma.      4/28/2025    Again admitted 4/21/2025    Admitted with worsening weakness of left upper extremity.  CTA head and neck showed vasogenic edema involving the left parietal and left occipital lobe similar compared to the recent brain MRI.  Small 5 mm hyperdense focus noted within the left parietal occipital lobe which corresponds to susceptibility and likely represents a small focus of hemorrhage within the dominant lesion in this region on prior brain MRI.  Re-demonstrated central necrotic mass involving paramediastinal anterior right upper lobe with surrounding scarring/fibrosis  MRI brain showing stable appearance of treated left brain metastases without any new metastases.  There is a small foci of right frontal diffusion signal most compatible with subacute infarcts     Again, had discussion regarding continued treatment with all issues she has had/having but she is not ready again for hospice    She is having pain in her lower back/hip area that was radiated with the gluteal mass but cannot have more RT and is using dilaudid and Tylenol with good effect    Is very tired and frustrated with left sided weakness which clearly persists    She is off Xarelto which is concerning as she had extensive PE s.      MRI to be repeated as there appeared to be hemorrhage            Essential thrombocytosis (HCC)       History of JAK2 positive thrombocytosis had required hydroxyurea in the past.  Her platelet counts have trended down likely in setting of chemotherapy.  Recent CBC now with thrombocytopenia    Plts 411 4/24/2025      Hypothyroidism, unspecified type    Continue home dose of 50 mcg of levothyroxine            History of Pneumocystis jirovecii pneumonia  Continue Bactrim 1 tab 3 times a week         Epistaxis  Presents with recurrent epistaxis for the past week, complains of clot which is intermittent, while blowing the nose, no injury or trauma,  Recently started on Xarelto 20 mg daily for PE,-was held and restarted   Reached out to ENT, and cauterization of the bleeding vessel on the right nare stopped the bleeding.  ,Has not had issues and restarted Xarelto      Summary/Recommendations      C2 5/5/2025 will dose reduce to 60 mg/m2    Will treat with above; assess tolerance.  Concern with readmission for weakness/FTT, etc with continued treatment    Palliative following for pain    Will remain on decadron 4 mg daily indefinately    Monitor labs       Follow up 2 weeks             History of Present Illness   No chief complaint on file.  HPI:  Ayla Nye is a 74 y.o. female with medical hx remarkable of HTN, HLD, Nodular Goiter, Osteoprosis, Psoriasis, essential thrombocytosis, Pulmonary Embolism, lung adenocarcinoma as outlined below, and had superficial perianal mass biopsy showed squamous cell carcinoma s/p R gluteus radiation therapy completed 10/18/2024 .      history of [id number] (IIIB) lung adenocarcinoma of the right upper lobe, s/p concurrent chemoRT completing on 7/5/24. She completed a course of SRS on 8/8/24 after MRI brain demonstrated five supra- and infratentorial enhancing lesions compatible with metastases. She also completed a course of palliative radiation to right gluteus (for superficial squamous cell lesion) on 10/18/24.      On treatment with Carboplatin AUC 5 Pemetrexed 500 mg/m2 and Pembrolizumab 200 mg IV every 3 weeks x 4 cycles. Cycle 2 was completed on 11/14/24.  Unfortunately her therapy has been interrupted due to frequent hospitalizations.     History of [id number] (IIIB) lung adenocarcinoma of the right upper lobe, s/p concurrent chemoRT completing on 7/5/24. She completed a course of SRS on 8/8/24 after MRI brain demonstrated five supra- and infratentorial enhancing  lesions compatible with metastases. She also completed a course of palliative radiation to right gluteus (for superficial squamous cell lesion) on 10/18/24.      Unfortunately her therapy has been interrupted due to frequent hospitalization, despite dose reduction.     Last chemotherapy on 2/13/2025 when she received carboplatin AUC 4 reduced from AUC 5, Pemetrexed 300 mg/m² which is reduced from 500 mg/m², bevacizumab 5 mg/kg, unfortunately developed fever, nausea and generalized weakness needing hospitalization again from 2/14 to 2/19 where she made SIRS criteria and was started on broad-spectrum antibiotics however infectious workup was negative.        Oncology History   Cancer Staging   Metastatic adenocarcinoma (HCC)  Staging form: Lung, AJCC 8th Edition  - Clinical stage from 4/15/2024: Stage IIIB (cT2b, cN3, cM0) - Signed by Rogelio Beebe DO on 4/15/2024  - Clinical: Stage IV (cM1) - Signed by Honey Gamboa MD on 9/23/2024  Oncology History   Metastatic adenocarcinoma (HCC)   2024 Initial Diagnosis    Primary lung adenocarcinoma, right (HCC)     3/26/2024 Biopsy    A-C. Lymph Node, Level 4R :    - Metastatic non-small cell carcinoma, most compatible with a primary lung adenocarcinoma; see note.       D-F. Lymph Node, Level 10R:    - Metastatic non-small cell carcinoma; see note.       G-I. Lymph Node, Level 4L:    - Metastatic non-small cell carcinoma; see note.       4/15/2024 -  Cancer Staged    Staging form: Lung, AJCC 8th Edition  - Clinical stage from 4/15/2024: Stage IIIB (cT2b, cN3, cM0) - Signed by Rogelio Beebe DO on 4/15/2024       5/23/2024 - 7/2/2024 Chemotherapy    alteplase (CATHFLO), 2 mg, Intracatheter, Every 1 Minute as needed, 6 of 6 cycles  CARBOplatin (PARAPLATIN) IVPB (GOG AUC DOSING), 130.4 mg, Intravenous, Once, 6 of 6 cycles  Administration: 130.4 mg (5/23/2024), 144.8 mg (5/30/2024), 138.4 mg (6/6/2024), 144.8 mg (6/13/2024), 132 mg (6/21/2024), 143.6 mg  (7/2/2024)  PACLItaxel (TAXOL) chemo IVPB, 45 mg/m2 = 67.2 mg (90 % of original dose 50 mg/m2), Intravenous, Once, 6 of 6 cycles  Dose modification: 45 mg/m2 (original dose 50 mg/m2, Cycle 1, Reason: Anticipated Tolerance)  Administration: 67.2 mg (5/23/2024), 67.2 mg (5/30/2024), 67.2 mg (6/6/2024), 67.2 mg (6/13/2024), 67.2 mg (6/21/2024), 67.2 mg (7/2/2024)     5/23/2024 - 7/5/2024 Radiation    Treatment:  Course: C1    Plan ID Energy Fractions Dose per Fraction (cGy) Dose Correction (cGy) Total Dose Delivered (cGy) Elapsed Days   R Lung_Hilum 6X 30 / 30 200 0 6,000 43      Treatment dates:  C1: 5/23/2024 - 7/5/2024 8/8/2024 - 8/8/2024 Radiation    SRS 5 PTVs   2000 cGy     9/12/2024 Biopsy    Mass, Superficial perianal mass:  - Squamous cell carcinoma.     Note: The patient's prior lung EBUS sampling shows the tumor to stain for TTF1 and Napsin with absent p40 expression.  The current perianal mass sampling shows diffuse p40 expression with absent TTF1 expression.  This may represent a primary anal/perianal squamous cell carcinoma versus a possible metastatic combined lung tumor (adenocarcinoma and previously unsampled squamous component).  Suggest clinical correlation and appropriate follow-up.         9/23/2024 -  Cancer Staged    Staging form: Lung, AJCC 8th Edition  - Clinical: Stage IV (cM1) - Signed by Honey Gamboa MD on 9/23/2024       10/1/2024 - 10/18/2024 Radiation    Course: C3    Plan ID Energy Fractions Dose per Fraction (cGy) Dose Correction (cGy) Total Dose Delivered (cGy) Elapsed Days   R Gluteus:1 10X/6X 14 / 15 300 0 4,200 17      Treatment Dates:  10/1/2024 - 10/18/2024.       10/7/2024 - 2/13/2025 Chemotherapy    cyanocobalamin, 1,000 mcg, Intramuscular, Once, 2 of 3 cycles  Administration: 1,000 mcg (8/19/2024), 1,000 mcg (1/23/2025)  alteplase (CATHFLO), 2 mg, Intracatheter, Every 1 Minute as needed, 4 of 6 cycles  palonosetron (ALOXI), 0.25 mg, Intravenous, Once, 2 of 4  cycles  Administration: 0.25 mg (1/23/2025), 0.25 mg (2/13/2025)  fosaprepitant (EMEND) IVPB, 150 mg, Intravenous, Once, 4 of 6 cycles  Administration: 150 mg (10/7/2024), 150 mg (1/23/2025), 150 mg (2/13/2025), 150 mg (11/14/2024)  CARBOplatin (PARAPLATIN) IVPB ([facility] AUC DOSING), 347 mg, Intravenous, Once, 4 of 6 cycles  Administration: 347 mg (10/7/2024), 256.8 mg (1/23/2025), 278.4 mg (2/13/2025), 346 mg (11/14/2024)  bevacizumab (AVASTIN) IVPB, 5 mg/kg = 280 mg (100 % of original dose 5 mg/kg), Intravenous, Once, 1 of 3 cycles  Dose modification: 5 mg/kg (original dose 5 mg/kg, Cycle 4, Reason: Anticipated Tolerance)  Administration: 280 mg (2/13/2025)  pemetrexed (ALIMTA) chemo infusion, 735 mg, Intravenous, Once, 4 of 6 cycles  Dose modification: 300 mg/m2 (original dose 500 mg/m2, Cycle 4, Reason: Anticipated Tolerance)  Administration: 700 mg (10/7/2024), 700 mg (1/23/2025), 468 mg (2/13/2025), 700 mg (11/14/2024)  pembrolizumab (KEYTRUDA) IVPB, 200 mg, Intravenous, Once, 2 of 2 cycles  Administration: 200 mg (11/14/2024), 200 mg (10/7/2024)     1/16/2025 - 1/16/2025 Radiation    Course: C4  Plan ID Energy Fractions Dose per Fraction (cGy) Dose Correction (cGy) Total Dose Delivered (cGy) Elapsed Days   SRS L_Front 6X-FFF 1 / 1 2,000 0 2,000 0      Treatment dates:  C4 SRS: 1/16/2025 - 1/16/2025         4/3/2025 -  Chemotherapy    DOCEtaxel (TAXOTERE) chemo infusion, 75 mg/m2 = 114 mg, 1 of 6 cycles  Dose modification: 60 mg/m2 (original dose 75 mg/m2, Cycle 2, Reason: Anticipated Tolerance)  Administration: 114 mg (4/3/2025)     Metastatic cancer to brain (HCC)   7/29/2024 Initial Diagnosis    Metastatic cancer to brain (HCC)     8/8/2024 - 8/8/2024 Radiation    SRS 5 PTVs   2000 cGy     9/12/2024 Biopsy    Mass, Superficial perianal mass:  - Squamous cell carcinoma.     Note: The patient's prior lung EBUS sampling shows the tumor to stain for TTF1 and Napsin with absent p40 expression.  The current perianal  mass sampling shows diffuse p40 expression with absent TTF1 expression.  This may represent a primary anal/perianal squamous cell carcinoma versus a possible metastatic combined lung tumor (adenocarcinoma and previously unsampled squamous component).  Suggest clinical correlation and appropriate follow-up.         10/1/2024 - 10/18/2024 Radiation    Course: C3    Plan ID Energy Fractions Dose per Fraction (cGy) Dose Correction (cGy) Total Dose Delivered (cGy) Elapsed Days   R Gluteus:1 10X/6X 14 / 15 300 0 4,200 17      Treatment Dates:  10/1/2024 - 10/18/2024.       1/16/2025 - 1/16/2025 Radiation    Course: C4  Plan ID Energy Fractions Dose per Fraction (cGy) Dose Correction (cGy) Total Dose Delivered (cGy) Elapsed Days   SRS L_Front 6X-FFF 1 / 1 2,000 0 2,000 0      Treatment dates:  C4 SRS: 1/16/2025 - 1/16/2025            Pertinent Medical History   Review of Systems   Constitutional:  Positive for fatigue.   HENT: Negative.     Respiratory: Negative.     Cardiovascular: Negative.    Gastrointestinal: Negative.    Musculoskeletal:  Positive for arthralgias and back pain.   Neurological:  Positive for facial asymmetry, weakness and numbness.   Hematological: Negative.    Psychiatric/Behavioral: Negative.             Objective   There were no vitals taken for this visit.    Pain Screening:     ECOG   2-3  Physical Exam  Vitals reviewed.   Constitutional:       Appearance: She is ill-appearing.   HENT:      Head: Normocephalic.      Nose: Nose normal.      Mouth/Throat:      Mouth: Mucous membranes are moist.      Pharynx: Oropharynx is clear.   Eyes:      Pupils: Pupils are equal, round, and reactive to light.   Cardiovascular:      Rate and Rhythm: Normal rate.      Pulses: Normal pulses.   Pulmonary:      Effort: Pulmonary effort is normal.   Abdominal:      General: Bowel sounds are normal.   Musculoskeletal:         General: Normal range of motion.      Cervical back: Normal range of motion.   Skin:      General: Skin is warm.   Neurological:      Mental Status: She is alert.      Motor: Weakness present.         Labs: I have reviewed the following labs:  Lab Results   Component Value Date/Time    WBC 10.21 (H) 04/24/2025 05:02 AM    RBC 3.20 (L) 04/24/2025 05:02 AM    Hemoglobin 9.1 (L) 04/24/2025 05:02 AM    Hematocrit 29.8 (L) 04/24/2025 05:02 AM    MCV 93 04/24/2025 05:02 AM    MCH 28.4 04/24/2025 05:02 AM    RDW 17.4 (H) 04/24/2025 05:02 AM    Platelets 411 (H) 04/24/2025 05:02 AM    Segmented % 71 02/19/2025 04:18 AM    Lymphocytes % 4 (L) 04/24/2025 05:02 AM    Lymphocytes % 21 02/19/2025 04:18 AM    Monocytes % 4 04/24/2025 05:02 AM    Monocytes % 5 02/19/2025 04:18 AM    Eosinophils % 0 04/24/2025 05:02 AM    Eosinophils Relative 1 02/19/2025 04:18 AM    Basophils % 0 04/24/2025 05:02 AM    Basophils Relative 1 02/19/2025 04:18 AM    Immature Grans % 1 02/19/2025 04:18 AM    Absolute Neutrophils 1.50 (L) 02/19/2025 04:18 AM     Lab Results   Component Value Date/Time    Potassium 4.3 04/24/2025 05:02 AM    Chloride 106 04/24/2025 05:02 AM    CO2 23 04/24/2025 05:02 AM    BUN 21 04/24/2025 05:02 AM    Creatinine 0.81 04/24/2025 05:02 AM    Glucose, Fasting 97 03/25/2025 07:47 AM    Calcium 9.0 04/24/2025 05:02 AM    Corrected Calcium 9.3 04/07/2025 01:40 PM    AST 10 (L) 04/07/2025 01:40 PM    ALT 6 (L) 04/07/2025 01:40 PM    Alkaline Phosphatase 44 04/07/2025 01:40 PM    Total Protein 6.4 04/07/2025 01:40 PM    Albumin 3.4 (L) 04/07/2025 01:40 PM    Total Bilirubin 0.27 04/07/2025 01:40 PM    eGFR 71 04/24/2025 05:02 AM           Administrative Statements   I have spent a total time of 40 minutes in caring for this patient on the day of the visit/encounter including Impressions, Counseling / Coordination of care, Documenting in the medical record, Reviewing/placing orders in the medical record (including tests, medications, and/or procedures), and Obtaining or reviewing history  .

## 2025-04-28 ENCOUNTER — TELEPHONE (OUTPATIENT)
Dept: INFUSION CENTER | Facility: HOSPITAL | Age: 75
End: 2025-04-28

## 2025-04-28 ENCOUNTER — TELEPHONE (OUTPATIENT)
Dept: HEMATOLOGY ONCOLOGY | Facility: CLINIC | Age: 75
End: 2025-04-28

## 2025-04-28 ENCOUNTER — OFFICE VISIT (OUTPATIENT)
Dept: HEMATOLOGY ONCOLOGY | Facility: CLINIC | Age: 75
End: 2025-04-28
Payer: MEDICARE

## 2025-04-28 ENCOUNTER — TELEPHONE (OUTPATIENT)
Age: 75
End: 2025-04-28

## 2025-04-28 VITALS
BODY MASS INDEX: 21.35 KG/M2 | HEIGHT: 62 IN | OXYGEN SATURATION: 95 % | WEIGHT: 116 LBS | HEART RATE: 91 BPM | DIASTOLIC BLOOD PRESSURE: 84 MMHG | TEMPERATURE: 97.4 F | SYSTOLIC BLOOD PRESSURE: 132 MMHG

## 2025-04-28 DIAGNOSIS — G93.89 BRAIN MASS: ICD-10-CM

## 2025-04-28 DIAGNOSIS — Z15.89 JAK2 V617F MUTATION: Primary | ICD-10-CM

## 2025-04-28 DIAGNOSIS — C34.11 MALIGNANT NEOPLASM OF UPPER LOBE, RIGHT BRONCHUS OR LUNG (HCC): ICD-10-CM

## 2025-04-28 DIAGNOSIS — I26.99 OTHER ACUTE PULMONARY EMBOLISM, UNSPECIFIED WHETHER ACUTE COR PULMONALE PRESENT (HCC): ICD-10-CM

## 2025-04-28 PROCEDURE — 99215 OFFICE O/P EST HI 40 MIN: CPT | Performed by: INTERNAL MEDICINE

## 2025-04-28 NOTE — TELEPHONE ENCOUNTER
Ayla Nye will need follow up in in 2 weeks with general attending or advance practitioner. She will require a repeat MRI within 4 weeks.     Pt was scheduled with Ny Arauz for 06/04/2025

## 2025-04-28 NOTE — TELEPHONE ENCOUNTER
Patient saw Dr. Castro today. Will restart treatment. Cycle 2 deferred to 5/5. Please adjust schedule

## 2025-05-01 ENCOUNTER — OFFICE VISIT (OUTPATIENT)
Age: 75
End: 2025-05-01
Payer: MEDICARE

## 2025-05-01 ENCOUNTER — TRANSITIONAL CARE MANAGEMENT (OUTPATIENT)
Age: 75
End: 2025-05-01

## 2025-05-01 ENCOUNTER — APPOINTMENT (OUTPATIENT)
Dept: LAB | Facility: CLINIC | Age: 75
End: 2025-05-01
Attending: INTERNAL MEDICINE
Payer: MEDICARE

## 2025-05-01 ENCOUNTER — TELEPHONE (OUTPATIENT)
Age: 75
End: 2025-05-01

## 2025-05-01 VITALS
BODY MASS INDEX: 21.71 KG/M2 | OXYGEN SATURATION: 98 % | RESPIRATION RATE: 18 BRPM | TEMPERATURE: 98 F | HEART RATE: 87 BPM | SYSTOLIC BLOOD PRESSURE: 134 MMHG | DIASTOLIC BLOOD PRESSURE: 86 MMHG | WEIGHT: 118 LBS | HEIGHT: 62 IN

## 2025-05-01 DIAGNOSIS — C79.31 LUNG CANCER METASTATIC TO BRAIN (HCC): Primary | ICD-10-CM

## 2025-05-01 DIAGNOSIS — C34.90 LUNG CANCER METASTATIC TO BRAIN (HCC): Primary | ICD-10-CM

## 2025-05-01 DIAGNOSIS — I10 PRIMARY HYPERTENSION: ICD-10-CM

## 2025-05-01 DIAGNOSIS — E03.9 ACQUIRED HYPOTHYROIDISM: ICD-10-CM

## 2025-05-01 DIAGNOSIS — K21.9 GASTROESOPHAGEAL REFLUX DISEASE WITHOUT ESOPHAGITIS: ICD-10-CM

## 2025-05-01 DIAGNOSIS — C79.9 METASTATIC ADENOCARCINOMA (HCC): ICD-10-CM

## 2025-05-01 DIAGNOSIS — E78.2 MIXED HYPERLIPIDEMIA: ICD-10-CM

## 2025-05-01 LAB
ALBUMIN SERPL BCG-MCNC: 4 G/DL (ref 3.5–5)
ALP SERPL-CCNC: 59 U/L (ref 34–104)
ALT SERPL W P-5'-P-CCNC: 10 U/L (ref 7–52)
ANION GAP SERPL CALCULATED.3IONS-SCNC: 11 MMOL/L (ref 4–13)
ANISOCYTOSIS BLD QL SMEAR: PRESENT
AST SERPL W P-5'-P-CCNC: 10 U/L (ref 13–39)
BASOPHILS # BLD MANUAL: 0 THOUSAND/UL (ref 0–0.1)
BASOPHILS NFR MAR MANUAL: 0 % (ref 0–1)
BILIRUB SERPL-MCNC: 0.27 MG/DL (ref 0.2–1)
BUN SERPL-MCNC: 25 MG/DL (ref 5–25)
CALCIUM SERPL-MCNC: 9.5 MG/DL (ref 8.4–10.2)
CHLORIDE SERPL-SCNC: 107 MMOL/L (ref 96–108)
CO2 SERPL-SCNC: 26 MMOL/L (ref 21–32)
CREAT SERPL-MCNC: 0.96 MG/DL (ref 0.6–1.3)
EOSINOPHIL # BLD MANUAL: 0 THOUSAND/UL (ref 0–0.4)
EOSINOPHIL NFR BLD MANUAL: 0 % (ref 0–6)
ERYTHROCYTE [DISTWIDTH] IN BLOOD BY AUTOMATED COUNT: 17.1 % (ref 11.6–15.1)
GFR SERPL CREATININE-BSD FRML MDRD: 58 ML/MIN/1.73SQ M
GLUCOSE SERPL-MCNC: 82 MG/DL (ref 65–140)
HCT VFR BLD AUTO: 32.3 % (ref 34.8–46.1)
HGB BLD-MCNC: 9.5 G/DL (ref 11.5–15.4)
LDH SERPL-CCNC: 473 U/L (ref 140–271)
LG PLATELETS BLD QL SMEAR: PRESENT
LYMPHOCYTES # BLD AUTO: 0 % (ref 14–44)
LYMPHOCYTES # BLD AUTO: 0 THOUSAND/UL (ref 0.6–4.47)
MAGNESIUM SERPL-MCNC: 2.1 MG/DL (ref 1.9–2.7)
MCH RBC QN AUTO: 27.9 PG (ref 26.8–34.3)
MCHC RBC AUTO-ENTMCNC: 29.4 G/DL (ref 31.4–37.4)
MCV RBC AUTO: 95 FL (ref 82–98)
METAMYELOCYTE ABSOLUTE CT: 0.13 THOUSAND/UL (ref 0–0.1)
METAMYELOCYTES NFR BLD MANUAL: 1 % (ref 0–1)
MONOCYTES # BLD AUTO: 0.27 THOUSAND/UL (ref 0–1.22)
MONOCYTES NFR BLD: 2 % (ref 4–12)
MYELOCYTE ABSOLUTE CT: 0.4 THOUSAND/UL (ref 0–0.1)
MYELOCYTES NFR BLD MANUAL: 3 % (ref 0–1)
NEUTROPHILS # BLD MANUAL: 12.56 THOUSAND/UL (ref 1.85–7.62)
NEUTS BAND NFR BLD MANUAL: 3 % (ref 0–8)
NEUTS SEG NFR BLD AUTO: 91 % (ref 43–75)
PLATELET # BLD AUTO: 404 THOUSANDS/UL (ref 149–390)
PLATELET BLD QL SMEAR: ABNORMAL
PMV BLD AUTO: 9.4 FL (ref 8.9–12.7)
POIKILOCYTOSIS BLD QL SMEAR: PRESENT
POLYCHROMASIA BLD QL SMEAR: PRESENT
POTASSIUM SERPL-SCNC: 4.4 MMOL/L (ref 3.5–5.3)
PROT SERPL-MCNC: 6.8 G/DL (ref 6.4–8.4)
RBC # BLD AUTO: 3.4 MILLION/UL (ref 3.81–5.12)
RBC MORPH BLD: PRESENT
SODIUM SERPL-SCNC: 144 MMOL/L (ref 135–147)
WBC # BLD AUTO: 13.36 THOUSAND/UL (ref 4.31–10.16)

## 2025-05-01 PROCEDURE — 80053 COMPREHEN METABOLIC PANEL: CPT

## 2025-05-01 PROCEDURE — 99496 TRANSJ CARE MGMT HIGH F2F 7D: CPT

## 2025-05-01 PROCEDURE — 36415 COLL VENOUS BLD VENIPUNCTURE: CPT

## 2025-05-01 PROCEDURE — 85027 COMPLETE CBC AUTOMATED: CPT

## 2025-05-01 PROCEDURE — 83735 ASSAY OF MAGNESIUM: CPT

## 2025-05-01 PROCEDURE — 83615 LACTATE (LD) (LDH) ENZYME: CPT

## 2025-05-01 PROCEDURE — 85007 BL SMEAR W/DIFF WBC COUNT: CPT

## 2025-05-01 NOTE — PROGRESS NOTES
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  TSH in therapeutic range.  Continue same dose of levothyroxine.  We will continue to monitor       Primary hypertension  Under control.  Controlled continue amlodipine.  We will continue to monitor       Gastroesophageal reflux disease without esophagitis  Under control.    Continue current medication.    We will re-evaluate at next office visit.              History of Present Illness     Transitional Care Management Review:   Ayla Nye is a 74 y.o. female here for TCM follow up.     During the TCM phone call patient stated:  TCM Call (since 2025)     Date and time call was made  2025 11:47 AM    Hospital care reviewed  Records reviewed    Patient was hospitialized at  [facility]    Date of Admission  25    Date of discharge  25    Diagnosis  left side weakness    Disposition  Home    Current Symptoms  None      TCM Call (since 2025)     Post hospital issues  None    I have advised the patient to call PCP with any new or worsening symptoms  Marta Vasquez MA        Patient here for transitional care management as patient was hospitalized on 2029 and discharged on 2025 as patient had worsening weakness of left upper extremity CTA of head and neck showed vasogenic edema involving the left parietal and left occipital lobe similar to previous scan patient was also seen by neurology hematology and oncology patient Xarelto was held because of  "microhemorrhages and was transitioning to aspirin 81 mg daily all the records from the hospital reviewed  Currently coming along same.  Continue to have left upper extremity weakness.  Also has tremendous fatigue and off-and-on lethargic.  Does get exertional shortness of breath.  No fever no chills no chest pain or abdominal pain.  No bowel or bladder issue at this point.  No other new problem.  Overall prognosis remains poor as and discussed with patient.      Review of Systems   Constitutional:  Positive for fatigue. Negative for chills and fever.   HENT:  Negative for congestion, ear pain, rhinorrhea, sneezing and sore throat.    Eyes:  Negative for redness, itching and visual disturbance.   Respiratory:  Positive for shortness of breath. Negative for cough and chest tightness.    Cardiovascular:  Negative for chest pain, palpitations and leg swelling.   Gastrointestinal:  Negative for abdominal pain, blood in stool, diarrhea, nausea and vomiting.   Endocrine: Negative for cold intolerance and heat intolerance.   Genitourinary:  Negative for dysuria, frequency and urgency.   Musculoskeletal:  Negative for arthralgias, back pain and myalgias.   Skin:  Negative for color change and rash.   Neurological:  Positive for weakness. Negative for dizziness, light-headedness, numbness and headaches.   Hematological:  Does not bruise/bleed easily.   Psychiatric/Behavioral:  Negative for agitation, behavioral problems and confusion.      Objective   /86 (BP Location: Right arm, Patient Position: Sitting, Cuff Size: Standard)   Pulse 87   Temp 98 °F (36.7 °C) (Temporal)   Resp 18   Ht 5' 2\" (1.575 m)   Wt 53.5 kg (118 lb)   SpO2 98%   BMI 21.58 kg/m²     Physical Exam  Vitals and nursing note reviewed.   Constitutional:       General: She is not in acute distress.     Appearance: Normal appearance. She is well-developed. She is not ill-appearing, toxic-appearing or diaphoretic.   HENT:      Head: Normocephalic " and atraumatic.      Right Ear: External ear normal.      Left Ear: External ear normal.      Nose: Nose normal.      Mouth/Throat:      Mouth: Mucous membranes are moist.      Pharynx: Oropharynx is clear. No posterior oropharyngeal erythema.   Eyes:      General: No scleral icterus.        Right eye: No discharge.         Left eye: No discharge.      Extraocular Movements: Extraocular movements intact.      Conjunctiva/sclera: Conjunctivae normal.      Pupils: Pupils are equal, round, and reactive to light.   Cardiovascular:      Rate and Rhythm: Normal rate and regular rhythm.      Pulses: Normal pulses.      Heart sounds: Normal heart sounds. No murmur heard.     No gallop.   Pulmonary:      Effort: Pulmonary effort is normal. No respiratory distress.      Breath sounds: No wheezing or rales.      Comments: Distant breath sounds  Abdominal:      General: Abdomen is flat. Bowel sounds are normal. There is no distension.      Palpations: Abdomen is soft.      Tenderness: There is no abdominal tenderness. There is no right CVA tenderness, left CVA tenderness or guarding.   Musculoskeletal:         General: No swelling or tenderness. Normal range of motion.      Cervical back: Normal range of motion and neck supple. No rigidity.      Right lower leg: No edema.      Left lower leg: No edema.   Lymphadenopathy:      Cervical: No cervical adenopathy.   Skin:     General: Skin is warm and dry.      Capillary Refill: Capillary refill takes 2 to 3 seconds.      Coloration: Skin is not jaundiced or pale.      Findings: No bruising.   Neurological:      General: No focal deficit present.      Mental Status: She is alert and oriented to person, place, and time. Mental status is at baseline.      Sensory: No sensory deficit.      Motor: Weakness (LEFT UPPER EXTREMITY) present.      Gait: Gait normal.   Psychiatric:         Mood and Affect: Mood normal.         Behavior: Behavior normal.       Medications have been reviewed by  provider in current encounter

## 2025-05-01 NOTE — TELEPHONE ENCOUNTER
April from [facility], calling with an update:     Admitted to home health.  Will receive skilled nursing, PT and OT.      Order forms will be faxed over.

## 2025-05-01 NOTE — ASSESSMENT & PLAN NOTE
Under control.  TSH in therapeutic range.  Continue same dose of levothyroxine.  We will continue to monitor

## 2025-05-02 ENCOUNTER — TELEPHONE (OUTPATIENT)
Dept: NEUROLOGY | Facility: CLINIC | Age: 75
End: 2025-05-02

## 2025-05-05 ENCOUNTER — HOSPITAL ENCOUNTER (OUTPATIENT)
Dept: INFUSION CENTER | Facility: HOSPITAL | Age: 75
Discharge: HOME | End: 2025-05-05
Attending: INTERNAL MEDICINE

## 2025-05-05 ENCOUNTER — APPOINTMENT (EMERGENCY)
Dept: CT IMAGING | Facility: HOSPITAL | Age: 75
DRG: 056 | End: 2025-05-05
Payer: MEDICARE

## 2025-05-05 ENCOUNTER — TELEPHONE (OUTPATIENT)
Age: 75
End: 2025-05-05

## 2025-05-05 ENCOUNTER — HOSPITAL ENCOUNTER (INPATIENT)
Facility: HOSPITAL | Age: 75
LOS: 2 days | Discharge: HOME HEALTH | DRG: 056 | End: 2025-05-07
Attending: EMERGENCY MEDICINE | Admitting: INTERNAL MEDICINE
Payer: MEDICARE

## 2025-05-05 DIAGNOSIS — R53.1 ACUTE LEFT-SIDED WEAKNESS: Primary | ICD-10-CM

## 2025-05-05 DIAGNOSIS — G93.6 VASOGENIC BRAIN EDEMA (HCC): ICD-10-CM

## 2025-05-05 DIAGNOSIS — R29.90 STROKE-LIKE SYMPTOMS: ICD-10-CM

## 2025-05-05 DIAGNOSIS — Z86.73 HISTORY OF STROKE: ICD-10-CM

## 2025-05-05 LAB
ANION GAP SERPL CALCULATED.3IONS-SCNC: 9 MMOL/L (ref 4–13)
ANISOCYTOSIS BLD QL SMEAR: PRESENT
APTT PPP: 25 SECONDS (ref 23–34)
ATRIAL RATE: 80 BPM
BASOPHILS # BLD MANUAL: 0 THOUSAND/UL (ref 0–0.1)
BASOPHILS NFR MAR MANUAL: 0 % (ref 0–1)
BUN SERPL-MCNC: 29 MG/DL (ref 5–25)
CALCIUM SERPL-MCNC: 9.3 MG/DL (ref 8.4–10.2)
CHLORIDE SERPL-SCNC: 104 MMOL/L (ref 96–108)
CO2 SERPL-SCNC: 25 MMOL/L (ref 21–32)
CREAT SERPL-MCNC: 1 MG/DL (ref 0.6–1.3)
EOSINOPHIL # BLD MANUAL: 0 THOUSAND/UL (ref 0–0.4)
EOSINOPHIL NFR BLD MANUAL: 0 % (ref 0–6)
ERYTHROCYTE [DISTWIDTH] IN BLOOD BY AUTOMATED COUNT: 17 % (ref 11.6–15.1)
GFR SERPL CREATININE-BSD FRML MDRD: 55 ML/MIN/1.73SQ M
GLUCOSE SERPL-MCNC: 111 MG/DL (ref 65–140)
HCT VFR BLD AUTO: 34.5 % (ref 34.8–46.1)
HGB BLD-MCNC: 10.6 G/DL (ref 11.5–15.4)
INR PPP: 0.94 (ref 0.85–1.19)
LG PLATELETS BLD QL SMEAR: PRESENT
LYMPHOCYTES # BLD AUTO: 0.28 THOUSAND/UL (ref 0.6–4.47)
LYMPHOCYTES # BLD AUTO: 2 % (ref 14–44)
MCH RBC QN AUTO: 28.6 PG (ref 26.8–34.3)
MCHC RBC AUTO-ENTMCNC: 30.7 G/DL (ref 31.4–37.4)
MCV RBC AUTO: 93 FL (ref 82–98)
METAMYELOCYTE ABSOLUTE CT: 0.28 THOUSAND/UL (ref 0–0.1)
METAMYELOCYTES NFR BLD MANUAL: 2 % (ref 0–1)
MONOCYTES # BLD AUTO: 0.57 THOUSAND/UL (ref 0–1.22)
MONOCYTES NFR BLD: 4 % (ref 4–12)
MYELOCYTE ABSOLUTE CT: 0.28 THOUSAND/UL (ref 0–0.1)
MYELOCYTES NFR BLD MANUAL: 2 % (ref 0–1)
NEUTROPHILS # BLD MANUAL: 12.82 THOUSAND/UL (ref 1.85–7.62)
NEUTS SEG NFR BLD AUTO: 90 % (ref 43–75)
P AXIS: 34 DEGREES
PLATELET # BLD AUTO: 428 THOUSANDS/UL (ref 149–390)
PLATELET BLD QL SMEAR: ABNORMAL
PMV BLD AUTO: 9.4 FL (ref 8.9–12.7)
POIKILOCYTOSIS BLD QL SMEAR: PRESENT
POLYCHROMASIA BLD QL SMEAR: PRESENT
POTASSIUM SERPL-SCNC: 3.9 MMOL/L (ref 3.5–5.3)
PR INTERVAL: 136 MS
PROTHROMBIN TIME: 13.2 SECONDS (ref 12.3–15)
QRS AXIS: 35 DEGREES
QRSD INTERVAL: 72 MS
QT INTERVAL: 380 MS
QTC INTERVAL: 438 MS
RBC # BLD AUTO: 3.7 MILLION/UL (ref 3.81–5.12)
RBC MORPH BLD: PRESENT
SODIUM SERPL-SCNC: 138 MMOL/L (ref 135–147)
T WAVE AXIS: -11 DEGREES
VENTRICULAR RATE: 80 BPM
WBC # BLD AUTO: 14.24 THOUSAND/UL (ref 4.31–10.16)

## 2025-05-05 PROCEDURE — 85610 PROTHROMBIN TIME: CPT | Performed by: EMERGENCY MEDICINE

## 2025-05-05 PROCEDURE — 85027 COMPLETE CBC AUTOMATED: CPT | Performed by: EMERGENCY MEDICINE

## 2025-05-05 PROCEDURE — 99285 EMERGENCY DEPT VISIT HI MDM: CPT

## 2025-05-05 PROCEDURE — 80048 BASIC METABOLIC PNL TOTAL CA: CPT | Performed by: EMERGENCY MEDICINE

## 2025-05-05 PROCEDURE — 70498 CT ANGIOGRAPHY NECK: CPT

## 2025-05-05 PROCEDURE — 93005 ELECTROCARDIOGRAM TRACING: CPT

## 2025-05-05 PROCEDURE — 99285 EMERGENCY DEPT VISIT HI MDM: CPT | Performed by: EMERGENCY MEDICINE

## 2025-05-05 PROCEDURE — 93010 ELECTROCARDIOGRAM REPORT: CPT | Performed by: STUDENT IN AN ORGANIZED HEALTH CARE EDUCATION/TRAINING PROGRAM

## 2025-05-05 PROCEDURE — 99223 1ST HOSP IP/OBS HIGH 75: CPT | Performed by: INTERNAL MEDICINE

## 2025-05-05 PROCEDURE — 99223 1ST HOSP IP/OBS HIGH 75: CPT | Performed by: PSYCHIATRY & NEUROLOGY

## 2025-05-05 PROCEDURE — 36415 COLL VENOUS BLD VENIPUNCTURE: CPT | Performed by: EMERGENCY MEDICINE

## 2025-05-05 PROCEDURE — 85007 BL SMEAR W/DIFF WBC COUNT: CPT | Performed by: EMERGENCY MEDICINE

## 2025-05-05 PROCEDURE — 85730 THROMBOPLASTIN TIME PARTIAL: CPT | Performed by: EMERGENCY MEDICINE

## 2025-05-05 PROCEDURE — 70496 CT ANGIOGRAPHY HEAD: CPT

## 2025-05-05 RX ORDER — SENNOSIDES 8.6 MG
1 TABLET ORAL EVERY EVENING
Status: DISCONTINUED | OUTPATIENT
Start: 2025-05-05 | End: 2025-05-07 | Stop reason: HOSPADM

## 2025-05-05 RX ORDER — LEVOTHYROXINE SODIUM 50 UG/1
50 TABLET ORAL
Status: DISCONTINUED | OUTPATIENT
Start: 2025-05-06 | End: 2025-05-07 | Stop reason: HOSPADM

## 2025-05-05 RX ORDER — ASPIRIN 81 MG/1
81 TABLET, CHEWABLE ORAL DAILY
Status: DISCONTINUED | OUTPATIENT
Start: 2025-05-05 | End: 2025-05-07 | Stop reason: HOSPADM

## 2025-05-05 RX ORDER — ONDANSETRON HYDROCHLORIDE 4 MG/5ML
4 SOLUTION ORAL EVERY 8 HOURS SCHEDULED
Status: DISCONTINUED | OUTPATIENT
Start: 2025-05-05 | End: 2025-05-07 | Stop reason: HOSPADM

## 2025-05-05 RX ORDER — SULFAMETHOXAZOLE AND TRIMETHOPRIM 800; 160 MG/1; MG/1
1 TABLET ORAL 3 TIMES WEEKLY
Status: DISCONTINUED | OUTPATIENT
Start: 2025-05-05 | End: 2025-05-07 | Stop reason: HOSPADM

## 2025-05-05 RX ORDER — GABAPENTIN 300 MG/1
300 CAPSULE ORAL
Status: DISCONTINUED | OUTPATIENT
Start: 2025-05-05 | End: 2025-05-07 | Stop reason: HOSPADM

## 2025-05-05 RX ORDER — ATORVASTATIN CALCIUM 40 MG/1
40 TABLET, FILM COATED ORAL
Status: DISCONTINUED | OUTPATIENT
Start: 2025-05-05 | End: 2025-05-07 | Stop reason: HOSPADM

## 2025-05-05 RX ORDER — PANTOPRAZOLE SODIUM 40 MG/1
40 TABLET, DELAYED RELEASE ORAL
Status: DISCONTINUED | OUTPATIENT
Start: 2025-05-06 | End: 2025-05-07 | Stop reason: HOSPADM

## 2025-05-05 RX ORDER — AMLODIPINE BESYLATE 5 MG/1
5 TABLET ORAL DAILY
Status: DISCONTINUED | OUTPATIENT
Start: 2025-05-05 | End: 2025-05-07 | Stop reason: HOSPADM

## 2025-05-05 RX ORDER — ASPIRIN 81 MG/1
81 TABLET, CHEWABLE ORAL DAILY
Status: DISCONTINUED | OUTPATIENT
Start: 2025-05-05 | End: 2025-05-05

## 2025-05-05 RX ORDER — MAGNESIUM HYDROXIDE/ALUMINUM HYDROXICE/SIMETHICONE 120; 1200; 1200 MG/30ML; MG/30ML; MG/30ML
30 SUSPENSION ORAL EVERY 4 HOURS PRN
Status: DISCONTINUED | OUTPATIENT
Start: 2025-05-05 | End: 2025-05-07 | Stop reason: HOSPADM

## 2025-05-05 RX ORDER — GABAPENTIN 100 MG/1
200 CAPSULE ORAL DAILY
Status: DISCONTINUED | OUTPATIENT
Start: 2025-05-05 | End: 2025-05-07 | Stop reason: HOSPADM

## 2025-05-05 RX ORDER — LEVETIRACETAM 500 MG/1
1000 TABLET ORAL 2 TIMES DAILY
Status: DISCONTINUED | OUTPATIENT
Start: 2025-05-05 | End: 2025-05-07 | Stop reason: HOSPADM

## 2025-05-05 RX ORDER — SENNOSIDES 8.6 MG
1 TABLET ORAL DAILY
Status: DISCONTINUED | OUTPATIENT
Start: 2025-05-05 | End: 2025-05-05 | Stop reason: SDUPTHER

## 2025-05-05 RX ORDER — DEXAMETHASONE 2 MG/1
2 TABLET ORAL 2 TIMES DAILY WITH MEALS
Status: DISCONTINUED | OUTPATIENT
Start: 2025-05-05 | End: 2025-05-06

## 2025-05-05 RX ORDER — HYDROMORPHONE HYDROCHLORIDE 2 MG/1
2 TABLET ORAL EVERY 4 HOURS PRN
Status: DISCONTINUED | OUTPATIENT
Start: 2025-05-05 | End: 2025-05-07 | Stop reason: HOSPADM

## 2025-05-05 RX ORDER — ACETAMINOPHEN 325 MG/1
975 TABLET ORAL EVERY 6 HOURS PRN
Status: DISCONTINUED | OUTPATIENT
Start: 2025-05-05 | End: 2025-05-07 | Stop reason: HOSPADM

## 2025-05-05 RX ADMIN — ALUMINUM HYDROXIDE, MAGNESIUM HYDROXIDE, AND DIMETHICONE 30 ML: 200; 20; 200 SUSPENSION ORAL at 19:39

## 2025-05-05 RX ADMIN — ATORVASTATIN CALCIUM 40 MG: 40 TABLET, FILM COATED ORAL at 17:50

## 2025-05-05 RX ADMIN — HYDROMORPHONE HYDROCHLORIDE 2 MG: 2 TABLET ORAL at 17:50

## 2025-05-05 RX ADMIN — SENNOSIDES 8.6 MG: 8.6 TABLET, FILM COATED ORAL at 17:50

## 2025-05-05 RX ADMIN — ALUMINUM HYDROXIDE, MAGNESIUM HYDROXIDE, AND DIMETHICONE 30 ML: 200; 20; 200 SUSPENSION ORAL at 15:31

## 2025-05-05 RX ADMIN — ACETAMINOPHEN 975 MG: 325 TABLET, FILM COATED ORAL at 19:39

## 2025-05-05 RX ADMIN — DEXAMETHASONE 2 MG: 2 TABLET ORAL at 17:50

## 2025-05-05 RX ADMIN — Medication 3 MG: at 21:49

## 2025-05-05 RX ADMIN — GABAPENTIN 300 MG: 300 CAPSULE ORAL at 22:36

## 2025-05-05 RX ADMIN — IOHEXOL 60 ML: 350 INJECTION, SOLUTION INTRAVENOUS at 11:31

## 2025-05-05 RX ADMIN — LEVETIRACETAM 1000 MG: 500 TABLET, FILM COATED ORAL at 21:49

## 2025-05-05 NOTE — ASSESSMENT & PLAN NOTE
"Patient was recently admitted with a stroke, MRI at the time (April 21) showed the following -  \"Stable appearance of the treated left brain metastases. No new metastases.     Small foci of right frontal diffusion signal most compatible with subacute infarcts.     Other stable findings above.\"    Will continue aspirin and Plavix, will await neurology input  "

## 2025-05-05 NOTE — TELEPHONE ENCOUNTER
Ayla Nye to [facility] (supporting Rosalinda Castro MD)         5/5/25  8:52 AM  Good morning   Ken scheduled at 12p. For chemotherapy  I am very weak this. AM onthe left side8 again.Should I go ahead with chemo or go to  o the ED

## 2025-05-05 NOTE — TELEPHONE ENCOUNTER
Phone outreach to the patient in response to her [employer] message reporting symptoms. I spoke with Ayla, she reports worsening left leg and left arm weakness this morning.  She reports this has been gradually over the past few days and today reports very weak entire left side. She reports was recent admitted for a small stroke and feels this is the same left sided weakness but worse.  She denies any further symptoms at present.  I advised her to get evaluated at the ED and she will have her former  pick her up and take her to [facility] ED for evaluation.  She thanked me.

## 2025-05-05 NOTE — PLAN OF CARE
Problem: PAIN - ADULT  Goal: Verbalizes/displays adequate comfort level or baseline comfort level  Description: Interventions:- Encourage patient to monitor pain and request assistance- Assess pain using appropriate pain scale- Administer analgesics based on type and severity of pain and evaluate response- Implement non-pharmacological measures as appropriate and evaluate response- Consider cultural and social influences on pain and pain management- Notify physician/advanced practitioner if interventions unsuccessful or patient reports new pain  Outcome: Progressing     Problem: INFECTION - ADULT  Goal: Absence or prevention of progression during hospitalization  Description: INTERVENTIONS:- Assess and monitor for signs and symptoms of infection- Monitor lab/diagnostic results- Monitor all insertion sites, i.e. indwelling lines, tubes, and drains- Monitor endotracheal if appropriate and nasal secretions for changes in amount and color- [place] appropriate cooling/warming therapies per order- Administer medications as ordered- Instruct and encourage patient and family to use good hand hygiene technique- Identify and instruct in appropriate isolation precautions for identified infection/condition  Outcome: Progressing  Goal: Absence of fever/infection during neutropenic period  Description: INTERVENTIONS:- Monitor WBC  Outcome: Progressing     Problem: SAFETY ADULT  Goal: Patient will remain free of falls  Description: INTERVENTIONS:- Educate patient/family on patient safety including physical limitations- Instruct patient to call for assistance with activity - Consult OT/PT to assist with strengthening/mobility - Keep Call bell within reach- Keep bed low and locked with side rails adjusted as appropriate- Keep care items and personal belongings within reach- Initiate and maintain comfort rounds- Make Fall Risk Sign visible to staff- Offer Toileting every  Hours, in advance of need- Initiate/Maintain alarm- Obtain  necessary fall risk management equipment: - Apply yellow socks and bracelet for high fall risk patients- Consider moving patient to room near nurses station  Outcome: Progressing  Goal: Maintain or return to baseline ADL function  Description: INTERVENTIONS:-  Assess patient's ability to carry out ADLs; assess patient's baseline for ADL function and identify physical deficits which impact ability to perform ADLs (bathing, care of mouth/teeth, toileting, grooming, dressing, etc.)- Assess/evaluate cause of self-care deficits - Assess range of motion- Assess patient's mobility; develop plan if impaired- Assess patient's need for assistive devices and provide as appropriate- Encourage maximum independence but intervene and supervise when necessary- Involve family in performance of ADLs- Assess for home care needs following discharge - Consider OT consult to assist with ADL evaluation and planning for discharge- Provide patient education as appropriate  Outcome: Not Progressing  Goal: Maintains/Returns to pre admission functional level  Description: INTERVENTIONS:- Perform AM-PAC 6 Click Basic Mobility/ Daily Activity assessment daily.- Set and communicate daily mobility goal to care team and patient/family/caregiver. - Collaborate with rehabilitation services on mobility goals if consulted- Perform Range of Motion  times a day.- Reposition patient every  hours.- Dangle patient  times a day- Stand patient  times a day- Ambulate patient  times a day- Out of bed to chair  times a day - Out of bed for meals times a day- Out of bed for toileting- Record patient progress and toleration of activity level   Outcome: Not Progressing     Problem: DISCHARGE PLANNING  Goal: Discharge to home or other facility with appropriate resources  Description: INTERVENTIONS:- Identify barriers to discharge w/patient and caregiver- Arrange for needed discharge resources and transportation as appropriate- Identify discharge learning needs  (meds, wound care, etc.)- Arrange for interpretive services to assist at discharge as needed- Refer to Case Management Department for coordinating discharge planning if the patient needs post-hospital services based on physician/advanced practitioner order or complex needs related to functional status, cognitive ability, or social support system  Outcome: Progressing     Problem: Knowledge Deficit  Goal: Patient/family/caregiver demonstrates understanding of disease process, treatment plan, medications, and discharge instructions  Description: Complete learning assessment and assess knowledge base.Interventions:- Provide teaching at level of understanding- Provide teaching via preferred learning methods  Outcome: Progressing     Problem: Neurological Deficit  Goal: Neurological status is stable or improving  Description: Interventions:- Monitor and assess patient's level of consciousness, motor function, sensory function, and level of assistance needed for ADLs. - Monitor and report changes from baseline. Collaborate with interdisciplinary team to initiate plan and implement interventions as ordered. - Provide and maintain a safe environment.- Consider seizure precautions.- Consider fall precautions.- Consider aspiration precautions.- Consider bleeding precautions.  Outcome: Progressing     Problem: Activity Intolerance/Impaired Mobility  Goal: Mobility/activity is maintained at optimum level for patient  Description: Interventions:- Assess and monitor patient  barriers to mobility and need for assistive/adaptive devices.- Assess patient's emotional response to limitations.- Collaborate with interdisciplinary team and initiate plans and interventions as ordered.- Encourage independent activity per ability.- Maintain proper body alignment.- Perform active/passive rom as tolerated/ordered.- Plan activities to conserve energy.- Turn patient as appropriate  Outcome: Not Progressing     Problem: Communication  Impairment  Goal: Ability to express needs and understand communication  Description: Assess patient's communication skills and ability to understand information.  Patient will demonstrate use of effective communication techniques, alternative methods of communication and understanding even if not able to speak. - Encourage communication and provide alternate methods of communication as needed.- Collaborate with case management/ for discharge needs.- Include patient/family/caregiver in decisions related to communication.  Outcome: Progressing     Problem: Potential for Aspiration  Goal: Non-ventilated patient's risk of aspiration is minimized  Description: Assess and monitor vital signs, respiratory status, and labs (WBC).  Monitor for signs of aspiration (tachypnea, cough, rales, wheezing, cyanosis, fever).- Assess and monitor patient's ability to swallow.- Place patient up in chair to eat if possible.- HOB up at 90 degrees to eat if unable to get patient up into chair.- Supervise patient during oral intake. - Instruct patient/ family to take small bites.- Instruct patient/ family to take small single sips when taking liquids.- Follow patient-specific strategies generated by speech pathologist.  Outcome: Progressing  Goal: Ventilated patient's risk of aspiration is minimized  Description: Assess and monitor vital signs, respiratory status, airway cuff pressure, and labs (WBC).  Monitor for signs of aspiration (tachypnea, cough, rales, wheezing, cyanosis, fever).- Elevate head of bed 30 degrees if patient has tube feeding.- Monitor tube feeding.  Outcome: Progressing     Problem: Nutrition  Goal: Nutrition/Hydration status is improving  Description: Monitor and assess patient's nutrition/hydration status for malnutrition (ex- brittle hair, bruises, dry skin, pale skin and conjunctiva, muscle wasting, smooth red tongue, and disorientation). Collaborate with interdisciplinary team and initiate plan and  interventions as ordered.  Monitor patient's weight and dietary intake as ordered or per policy. Utilize nutrition screening tool and intervene per policy. Determine patient's food preferences and provide high-protein, high-caloric foods as appropriate. - Assist patient with eating.- Allow adequate time for meals.- Encourage patient to take dietary supplement as ordered.- Collaborate with clinical nutritionist.- Include patient/family/caregiver in decisions related to nutrition.  Outcome: Progressing

## 2025-05-05 NOTE — ASSESSMENT & PLAN NOTE
Hemoglobin is 10, this is actually above baseline, to trend, likely in the setting of metastatic cancer.

## 2025-05-05 NOTE — ASSESSMENT & PLAN NOTE
CT scan in ED shows -   CT Brain:  - Known left occipital lobe metastatic mass with small focus of subacute hemorrhage, and diffuse perilesional vasogenic edema in left cerebral hemisphere (worse posteriorly) was better evaluated on MRI brain with and without contrast dated 4/21/2025. No   new acute intracranial abnormality. Consider follow-up MRI brain with and without contrast for further evaluation.     CT Angiography:  - Negative CTA head and neck for large vessel occlusion, dissection, aneurysm, or high-grade stenosis.  - Partially imaged known right upper lobe lung mass with adjacent paramediastinal fibrosis, small left upper lobe metastatic pulmonary nodule. Recommend follow-up dedicated CT chest with contrast per oncologic recommendations.     Additional chronic/incidental findings as detailed above.    Discussed with ED who discussed with neurology and they are recommending MRI with and without contrast, stroke pathway.      Patient presenting with left-sided weakness approximately a week ago found to have infarct in the right MCA territory.  This is now gradually been getting worse and the patient specifically noted acutely worse today.    Will proceed with MRI with and without contrast as well as neurochecks and stroke pathway.  Does have history of PE and also metastatic cancer and her anticoagulation had been held because of new diagnosis of brain mets, suspect hypercoagulable state of malignancy and possible second stroke

## 2025-05-05 NOTE — CONSULTS
Consultation - Neurology   Name: Ayla Nye 74 y.o. female I MRN: [id number]  Unit/Bed#: ED-08 I Date of Admission: 5/5/2025   Date of Service: 5/5/2025 I Hospital Day: 0   Inpatient consult to Neurology  Consult performed by: Mynor Ceja MD  Consult ordered by: Sarah Lam MD        Physician Requesting Evaluation: Sarah Lam MD   Reason for Evaluation / Principal Problem: Stroke like symptoms        Stroke-Like Symptoms: Patient with history of lung adenocarcinoma with brain mets (s/p chemo, radiation, immunotherapy, and stereotactic brain radiation Aug 2024 and Jan 2025) is presenting with worsening left upper extremity and left lower extremity weakness starting on Saturday mid day. Had prior hospitalization April 2025 for similar symptoms which she states have been stable until Saturday.  During that visit, brain MRI showed stable appearance of the treated left brain metastasis and new metastases, as well as a small foci of right frontal diffusion signal that was most compatible with subacute infarcts.  Today, neuro exam showing 4/5 LUE weakness, 4/5 LLE weakness, and abnormal cerebellar signs (quick repetitive movements exam), as well as possible mild right sided visual field deficit.  CTA head and neck showing known left occipital lobe metastatic mass, small foci of subacute hemorrhage, and diffuse perilesional vasogenic edema in the left cerebral hemisphere (worse posteriorly), but no new acute intracranial abnormality.  CTA head and neck was negative for any LVO, dissection, aneurysm or high-grade stenosis.  Suspecting that this presentation could be possibly due to worsening metastasis, vasogenic edema, hemorrhage, versus stroke.  Will recommend MRI brain without contrast to further evaluate for worsening vasogenic edema, new metastasis, and status of brain hemorrhage  If brain MRI showing a new stroke, we will have to rediscuss with the patient benefits versus risks of  restarting her on Xarelto  Monitor neurological exam  PT OT recommendations  Patient had an echocardiogram last month, does not need to repeat  Lipid panel last admission showing   A1c last admission normal  Telemetry monitoring  Right frontal subacute infarct: Seen on MRI brain from April 2025.  During this admission, patient had presented with left upper extremity and left lower extremity weakness 3 out of 5.  She was started on aspirin and Lipitor.  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 Chou at [facility].  Completed systemic chemotherapy with carboplatin and pemetrexed, most recently January 2025.  She has a history of brain mets to the left occipital lobe and has had stereotactic brain radiation in August 2024 in January 2025.  She has been on steroids with Decadron, currently 2 mg daily.  Most recent imaging MRI April 21, 2025 showing stable appearance of treated left brain metastasis but no new metastasis.  History of pulmonary embolism: In the setting of lung adenocarcinoma.  Was previously on Xarelto, but this was discontinued after a new foci of hemorrhage within the left occipital metastasis was found last admission.  She has since only been on aspirin daily.  Hyperlipidemia: Lipitor 40 mg daily  Hypothyroidism: Levothyroxine 50 mcg daily  Hypertension: On amlodipine 5 mg daily at home, per primary team.    History of Present Illness   Ayla Nye is a 74 y.o. female who was admitted to [facility] after presenting with worsening left upper extremity and left lower extremity weakness. A neurology consultation is requested today for assistance in the management of strokelike symptoms.  Patient states that starting on Saturday midday, she started feeling worsening of her left upper extremity weakness as well as left lower extremity weakness.  She states that she was not able to  grasp objects.  She also endorses that her ambulatory function was worsened, and she was leaning towards the right when she ambulated.  She states that her symptoms worsened on Sunday and then this morning she was unable to ambulate at all, and had to crawl to the bathroom on her hands and knees.    Of note, patient had a previous hospitalization a few weeks ago in April 2025 where she had the same symptoms.  MRI brain at that time showed stable appearance of the already treated left brain metastasis and it also found a small foci of right frontal diffusion signal most compatible with subacute infarcts.  Patient states, that when she was discharged from the hospital, her symptoms remained stable up until Saturday.    She also endorses over the past week that she has been having some intermittent blurry vision.  She denies any headaches, lightheadedness, dizziness, palpitations, near-syncope, chest pain or pressure, shortness of breath, extremity swelling.    Review of Systems   Constitutional:  Negative for chills and fever.   HENT:  Negative for ear pain and sore throat.    Eyes:  Negative for pain and visual disturbance.   Respiratory:  Negative for cough and shortness of breath.    Cardiovascular:  Negative for chest pain and palpitations.   Gastrointestinal:  Negative for abdominal pain and vomiting.   Genitourinary:  Negative for dysuria and hematuria.   Musculoskeletal:  Negative for arthralgias and back pain.   Skin:  Negative for color change and rash.   Neurological:  Positive for weakness. Negative for seizures and syncope.   All other systems reviewed and are negative.    Historical Information   Past Medical History:   Diagnosis Date    DILLON (acute kidney injury) (HCC) 02/14/2025    Allergic 2022    Allergic to neomiacin and cephalexin    Cancer (HCC)     lung cancer    Cancer (HCC)     mets to brain    Cancer (HCC)     perianal mass    Cataract Nov. 2023    Due to have durgery June 2024    Essential  thrombocytosis (HCC)     controlled with medication    History of transfusion     Hyperlipidemia     Hypertension     Mass in chest     Nodular goiter     Osteoporosis     Peripheral neuropathy     Pneumonia Oct 16, 2023    Also  2024    Psoriasis     SIRS (systemic inflammatory response syndrome) (HCC) 2024     Past Surgical History:   Procedure Laterality Date    APPENDECTOMY      BREAST CYST EXCISION Right     COLONOSCOPY  10/31/2018    DXA PROCEDURE (HISTORICAL)  2017    IR BIOPSY OTHER  2024    IR LUMBAR PUNCTURE  2024    MAMMO (HISTORICAL)      -    MAMMO NEEDLE LOCALIZATION RIGHT (ALL INC) Right 2009    SKIN LESION EXCISION      bridge of nose     Social History     Tobacco Use    Smoking status: Former     Current packs/day: 1.00     Average packs/day: 1 pack/day for 59.3 years (59.3 ttl pk-yrs)     Types: Cigarettes     Start date:      Passive exposure: Past    Smokeless tobacco: Never    Tobacco comments:     Quit    Vaping Use    Vaping status: Never Used   Substance and Sexual Activity    Alcohol use: Not Currently     Alcohol/week: 5.0 standard drinks of alcohol     Types: 5 Glasses of wine per week    Drug use: Never    Sexual activity: Not Currently     Partners: Male     Birth control/protection: Post-menopausal     E-Cigarette/Vaping    E-Cigarette Use Never User      E-Cigarette/Vaping Substances    Nicotine No     THC No     CBD No     Flavoring No     Other No     Unknown No      Family History   Problem Relation Age of Onset    Stroke Mother     Depression Mother             Hypertension Mother     Hearing loss Mother     Tuberculosis Father     Cancer Brother         lung    Tuberculosis Brother     Coronary artery disease Brother     Hypertension Brother     Heart disease Brother     No Known Problems Daughter     No Known Problems Daughter     No Known Problems Maternal Grandmother     No Known Problems Maternal Grandfather      No Known Problems Paternal Grandmother     No Known Problems Paternal Grandfather     No Known Problems Maternal Aunt     No Known Problems Maternal Aunt     No Known Problems Maternal Aunt     No Known Problems Maternal Aunt     No Known Problems Paternal Aunt     Cancer Brother         Throat cancer     Social History     Tobacco Use    Smoking status: Former     Current packs/day: 1.00     Average packs/day: 1 pack/day for 59.3 years (59.3 ttl pk-yrs)     Types: Cigarettes     Start date: 1966     Passive exposure: Past    Smokeless tobacco: Never    Tobacco comments:     Quit 2010   Vaping Use    Vaping status: Never Used   Substance and Sexual Activity    Alcohol use: Not Currently     Alcohol/week: 5.0 standard drinks of alcohol     Types: 5 Glasses of wine per week    Drug use: Never    Sexual activity: Not Currently     Partners: Male     Birth control/protection: Post-menopausal       Current Facility-Administered Medications:     aluminum-magnesium hydroxide-simethicone (MAALOX) oral suspension 30 mL, Q4H PRN    [Held by provider] amLODIPine (NORVASC) tablet 5 mg, Daily    aspirin chewable tablet 81 mg, Daily    atorvastatin (LIPITOR) tablet 40 mg, Daily With Dinner    cyanocobalamin (VITAMIN B-12) tablet 1,000 mcg, Daily    dexamethasone (DECADRON) tablet 2 mg, BID With Meals    gabapentin (NEURONTIN) capsule 100 mg, Daily    HYDROmorphone (DILAUDID) tablet 2 mg, Q4H PRN    levETIRAcetam (KEPPRA) tablet 1,000 mg, BID    [START ON 5/6/2025] levothyroxine tablet 50 mcg, Early Morning    ondansetron (ZOFRAN) oral solution 4 mg, Q8H KRISS    [START ON 5/6/2025] pantoprazole (PROTONIX) EC tablet 40 mg, Early Morning    senna (SENOKOT) tablet 8.6 mg, QPM    sulfamethoxazole-trimethoprim (BACTRIM DS) 800-160 mg per tablet 1 tablet, Once per day on Monday Wednesday Friday  Prior to Admission Medications   Prescriptions Last Dose Informant Patient Reported? Taking?   HYDROmorphone (DILAUDID) 2 mg tablet   No No    Sig: Take 1 tablet (2 mg total) by mouth every 4 (four) hours as needed for moderate pain Max Daily Amount: 12 mg   aluminum-magnesium hydroxide-simethicone (MAALOX) 4228-8130-443 mg/30 mL suspension   No No   Sig: Take 30 mL by mouth every 4 (four) hours as needed for indigestion or heartburn   amLODIPine (NORVASC) 5 mg tablet   No No   Sig: TAKE 1 TABLET(5 MG) BY MOUTH DAILY   aspirin 81 mg chewable tablet   No No   Sig: Chew 1 tablet (81 mg total) daily for 29 doses   atorvastatin (LIPITOR) 40 mg tablet   No No   Sig: Take 1 tablet (40 mg total) by mouth daily with dinner   dexamethasone (DECADRON) 2 mg tablet   No No   Sig: Take 1 tablet (2 mg total) by mouth 2 (two) times a day with meals   gabapentin (NEURONTIN) 100 mg capsule   No No   Sig: TAKE 1 CAPSULE BY MOUTH EVERY MORNING, 1 CAPSULE EVERY AFTERNOON AND 3 CAPSULES EVERY NIGHT AT BEDTIME   Patient taking differently: TAKE 2 CAPSULES BY MOUTH EVERY MORNING AND 3 CAPSULES EVERY NIGHT AT BEDTIME (patient did not change her regimen per doctor)   levETIRAcetam (KEPPRA) 1000 MG tablet   No No   Sig: TAKE 1 TABLET(1000 MG) BY MOUTH EVERY 12 HOURS   levothyroxine 50 mcg tablet   No No   Sig: TAKE 1 TABLET(50 MCG) BY MOUTH DAILY EARLY MORNING   ondansetron (ZOFRAN) 4 mg tablet   No No   Sig: Take 1 tablet (4 mg total) by mouth every 8 (eight) hours as needed for nausea or vomiting   pantoprazole (PROTONIX) 40 mg tablet   No No   Sig: Take 1 tablet (40 mg total) by mouth daily in the early morning   senna (SENOKOT) 8.6 MG tablet  Self Yes No   Sig: Take 1 tablet by mouth daily Takes one every other day   sulfamethoxazole-trimethoprim (BACTRIM DS) 800-160 mg per tablet   No No   Sig: Take 1 tablet by mouth 3 (three) times a week Starting 12/24, you can take one tablet Monday, Wednesday, and Fridays.   vitamin B-12 (VITAMIN B-12) 1,000 mcg tablet  Self No No   Sig: Take 1 tablet (1,000 mcg total) by mouth daily      Facility-Administered Medications: None      Doxycycline, Keflex [cephalexin], and Neomycin-polymyxin-dexameth    Objective :  Temp:  [97.9 °F (36.6 °C)] 97.9 °F (36.6 °C)  HR:  [74-88] 74  BP: (125-154)/(75-79) 154/79  Resp:  [18-20] 18  SpO2:  [95 %] 95 %  O2 Device: None (Room air)    Current Weight:    First Weight:    I/O       None          Physical Exam  Vitals and nursing note reviewed.   Constitutional:       General: She is not in acute distress.     Appearance: She is well-developed. She is not ill-appearing.   HENT:      Head: Normocephalic and atraumatic.   Eyes:      General: Visual field deficit (Right upper quadronopsia) present. No scleral icterus.     Extraocular Movements: Extraocular movements intact.      Conjunctiva/sclera: Conjunctivae normal.      Pupils: Pupils are equal, round, and reactive to light.   Cardiovascular:      Rate and Rhythm: Normal rate and regular rhythm.      Heart sounds: No murmur heard.  Pulmonary:      Effort: Pulmonary effort is normal. No respiratory distress.      Breath sounds: Normal breath sounds.   Abdominal:      Palpations: Abdomen is soft.      Tenderness: There is no abdominal tenderness.   Musculoskeletal:         General: No swelling.      Cervical back: Neck supple.      Right lower leg: No edema.      Left lower leg: No edema.   Skin:     General: Skin is warm and dry.      Capillary Refill: Capillary refill takes less than 2 seconds.   Neurological:      Mental Status: She is alert and oriented to person, place, and time.      GCS: GCS eye subscore is 4. GCS verbal subscore is 5. GCS motor subscore is 6.      Cranial Nerves: No cranial nerve deficit, dysarthria or facial asymmetry.      Sensory: Sensation is intact. No sensory deficit.      Motor: Weakness (LUE 4/5, LLE 4/5, RUE 5/5, RLE 5/5) and pronator drift (Left upper extremity) present. No tremor, atrophy, abnormal muscle tone or seizure activity.      Coordination: Coordination abnormal (abnormal left hand quick repetitive movement  exam). Finger-Nose-Finger Test normal.      Gait: Gait is intact. Tandem walk (She is able to do it but slowly) normal.   Psychiatric:         Mood and Affect: Mood normal.         Behavior: Behavior normal.         Medications:  No current facility-administered medications for this encounter.    Current Outpatient Medications:     aluminum-magnesium hydroxide-simethicone (MAALOX) 1945-1271-894 mg/30 mL suspension, Take 30 mL by mouth every 4 (four) hours as needed for indigestion or heartburn, Disp: 355 mL, Rfl: 0    amLODIPine (NORVASC) 5 mg tablet, TAKE 1 TABLET(5 MG) BY MOUTH DAILY, Disp: 30 tablet, Rfl: 5    aspirin 81 mg chewable tablet, Chew 1 tablet (81 mg total) daily for 29 doses, Disp: 29 tablet, Rfl: 0    atorvastatin (LIPITOR) 40 mg tablet, Take 1 tablet (40 mg total) by mouth daily with dinner, Disp: 30 tablet, Rfl: 0    dexamethasone (DECADRON) 2 mg tablet, Take 1 tablet (2 mg total) by mouth 2 (two) times a day with meals, Disp: 180 tablet, Rfl: 0    gabapentin (NEURONTIN) 100 mg capsule, TAKE 1 CAPSULE BY MOUTH EVERY MORNING, 1 CAPSULE EVERY AFTERNOON AND 3 CAPSULES EVERY NIGHT AT BEDTIME (Patient taking differently: TAKE 2 CAPSULES BY MOUTH EVERY MORNING AND 3 CAPSULES EVERY NIGHT AT BEDTIME (patient did not change her regimen per doctor)), Disp: 150 capsule, Rfl: 5    HYDROmorphone (DILAUDID) 2 mg tablet, Take 1 tablet (2 mg total) by mouth every 4 (four) hours as needed for moderate pain Max Daily Amount: 12 mg, Disp: 90 tablet, Rfl: 0    levETIRAcetam (KEPPRA) 1000 MG tablet, TAKE 1 TABLET(1000 MG) BY MOUTH EVERY 12 HOURS, Disp: 60 tablet, Rfl: 5    levothyroxine 50 mcg tablet, TAKE 1 TABLET(50 MCG) BY MOUTH DAILY EARLY MORNING, Disp: 30 tablet, Rfl: 5    ondansetron (ZOFRAN) 4 mg tablet, Take 1 tablet (4 mg total) by mouth every 8 (eight) hours as needed for nausea or vomiting, Disp: 30 tablet, Rfl: 2    pantoprazole (PROTONIX) 40 mg tablet, Take 1 tablet (40 mg total) by mouth daily in the  "early morning, Disp: 30 tablet, Rfl: 0    senna (SENOKOT) 8.6 MG tablet, Take 1 tablet by mouth daily Takes one every other day, Disp: , Rfl:     sulfamethoxazole-trimethoprim (BACTRIM DS) 800-160 mg per tablet, Take 1 tablet by mouth 3 (three) times a week Starting 12/24, you can take one tablet Monday, Wednesday, and Fridays., Disp: 36 tablet, Rfl: 1    vitamin B-12 (VITAMIN B-12) 1,000 mcg tablet, Take 1 tablet (1,000 mcg total) by mouth daily, Disp: 90 tablet, Rfl: 1      Lab Results: I have reviewed the following results:  Results from last 7 days   Lab Units 05/05/25  1044 05/01/25  1041   WBC Thousand/uL 14.24* 13.36*   HEMOGLOBIN g/dL 10.6* 9.5*   HEMATOCRIT % 34.5* 32.3*   PLATELETS Thousands/uL 428* 404*   POTASSIUM mmol/L 3.9 4.4   CHLORIDE mmol/L 104 107   CO2 mmol/L 25 26   BUN mg/dL 29* 25   CREATININE mg/dL 1.00 0.96   CALCIUM mg/dL 9.3 9.5   MAGNESIUM mg/dL  --  2.1   ALBUMIN g/dL  --  4.0       Administrative Statements     Portions of the record may have been created with voice recognition software. Occasional wrong word or \"sound a like\" substitutions may have occurred due to the inherent limitations of voice recognition software. Read the chart carefully and recognize, using context, where substitutions have occurred.If you have any questions, please contact the dictating provider.  "

## 2025-05-05 NOTE — ED PROVIDER NOTES
Time reflects when diagnosis was documented in both MDM as applicable and the Disposition within this note       Time User Action Codes Description Comment    5/5/2025 12:49 PM Marlon Lira Add [R53.1] Acute left-sided weakness     5/5/2025  2:16 PM Sarah Lam Add [Z86.73] History of stroke     5/5/2025  2:16 PM Sarah Lam Add [R29.90] Stroke-like symptoms     5/6/2025  1:36 PM Cheri Mills Add [G93.6] Vasogenic brain edema (HCC)           ED Disposition       ED Disposition   Admit    Condition   Stable    Date/Time   Mon May 5, 2025 12:49 PM    Comment   Case was discussed with Dr Lam and the patient's admission status was agreed to be Admission Status: inpatient status to the service of Dr. Lam .               Assessment & Plan       Medical Decision Making  73 yo F w/ 2 day history of worsening left sided weakness. CTA head and neck shows no acute changes. Given new weakness, consideration of subacute CVA or worsening metastatic malignancy to brain, admit to [facility] with neuro consult. Not a TNK candidate, out of window w/ symptoms for 2 days. Pt did already take her aspirin today, so not given in the ED.    Amount and/or Complexity of Data Reviewed  Labs: ordered.  Radiology: ordered.    Risk  Prescription drug management.  Decision regarding hospitalization.             Medications   aluminum-magnesium hydroxide-simethicone (MAALOX) oral suspension 30 mL (has no administration in time range)   amLODIPine (NORVASC) tablet 5 mg (0 mg Oral Hold 5/5/25 1430)   aspirin chewable tablet 81 mg (has no administration in time range)   atorvastatin (LIPITOR) tablet 40 mg (has no administration in time range)   gabapentin (NEURONTIN) capsule 200 mg (has no administration in time range)   HYDROmorphone (DILAUDID) tablet 2 mg (has no administration in time range)   levETIRAcetam (KEPPRA) tablet 1,000 mg (has no administration in time range)   levothyroxine tablet 50 mcg (has no administration in time range)    ondansetron (ZOFRAN) oral solution 4 mg (has no administration in time range)   pantoprazole (PROTONIX) EC tablet 40 mg (has no administration in time range)   sulfamethoxazole-trimethoprim (BACTRIM DS) 800-160 mg per tablet 1 tablet (has no administration in time range)   cyanocobalamin (VITAMIN B-12) tablet 1,000 mcg (has no administration in time range)   iohexol (OMNIPAQUE) 350 MG/ML injection (MULTI-DOSE) 60 mL (60 mL Intravenous Given 5/5/25 1131)       ED Risk Strat Scores         Stroke Assessment       Row Name 05/05/25 1030             NIH Stroke Scale    Interval Baseline      Level of Consciousness (1a.) 0      LOC Questions (1b.) 0      LOC Commands (1c.) 0      Best Gaze (2.) 0      Visual (3.) 0      Facial Palsy (4.) 0      Motor Arm, Left (5a.) 1      Motor Arm, Right (5b.) 0      Motor Leg, Left (6a.) 2      Motor Leg, Right (6b.) 0      Limb Ataxia (7.) 1      Sensory (8.) 0      Best Language (9.) 0      Dysarthria (10.) 0      Extinction and Inattention (11.) (Formerly Neglect) 0      Total 4                    Flowsheet Row Most Recent Value   Thrombolytic Decision Options    Thrombolytic Decision Patient not a candidate.   Patient is not a candidate options Unclear time of onset outside appropriate time window.                    No data recorded        SBIRT 22yo+      Flowsheet Row Most Recent Value   Initial Alcohol Screen: US AUDIT-C     1. How often do you have a drink containing alcohol? 0 Filed at: 05/05/2025 1024   2. How many drinks containing alcohol do you have on a typical day you are drinking?  0 Filed at: 05/05/2025 1024   3a. Male UNDER 65: How often do you have five or more drinks on one occasion? 0 Filed at: 05/05/2025 1024   3b. FEMALE Any Age, or MALE 65+: How often do you have 4 or more drinks on one occassion? 0 Filed at: 05/05/2025 1024   Audit-C Score 0 Filed at: 05/05/2025 1024   ALEXIS: How many times in the past year have you...    Used an illegal drug or used a  prescription medication for non-medical reasons? Never Filed at: 2025 1024                            History of Present Illness       Chief Complaint   Patient presents with    Extremity Weakness     Pt with hx of TIA in August. Actively being treated with chemo for lung, brain and pelvic cancer. Was due for chemo today but was feeling more weak on the left side. Has residual L arm weakness and grasp issues but feels like the leg is worse and her balance is worse today. Called Her oncologist and advised to come in for eval.        Past Medical History:   Diagnosis Date    DILLON (acute kidney injury) (HCC) 2025    Allergic     Allergic to neomiacin and cephalexin    Cancer (HCC)     lung cancer    Cancer (HCC)     mets to brain    Cancer (HCC)     perianal mass    Cataract 2023    Due to have durgery 2024    Essential thrombocytosis (HCC)     controlled with medication    History of transfusion     Hyperlipidemia     Hypertension     Mass in chest     Nodular goiter     Osteoporosis     Peripheral neuropathy     Pneumonia Oct 16, 2023    Also  2024    Psoriasis     SIRS (systemic inflammatory response syndrome) (HCC) 2024      Past Surgical History:   Procedure Laterality Date    APPENDECTOMY      BREAST CYST EXCISION Right     COLONOSCOPY  10/31/2018    DXA PROCEDURE (HISTORICAL)  2017    IR BIOPSY OTHER  2024    IR LUMBAR PUNCTURE  2024    MAMMO (HISTORICAL)      8-24-18    MAMMO NEEDLE LOCALIZATION RIGHT (ALL INC) Right 2009    SKIN LESION EXCISION      bridge of nose      Family History   Problem Relation Age of Onset    Stroke Mother     Depression Mother             Hypertension Mother     Hearing loss Mother     Tuberculosis Father     Cancer Brother         lung    Tuberculosis Brother     Coronary artery disease Brother     Hypertension Brother     Heart disease Brother     No Known Problems Daughter     No Known Problems Daughter  "    No Known Problems Maternal Grandmother     No Known Problems Maternal Grandfather     No Known Problems Paternal Grandmother     No Known Problems Paternal Grandfather     No Known Problems Maternal Aunt     No Known Problems Maternal Aunt     No Known Problems Maternal Aunt     No Known Problems Maternal Aunt     No Known Problems Paternal Aunt     Cancer Brother         Throat cancer      Social History     Tobacco Use    Smoking status: Former     Current packs/day: 1.00     Average packs/day: 1 pack/day for 59.3 years (59.3 ttl pk-yrs)     Types: Cigarettes     Start date: 1966     Passive exposure: Past    Smokeless tobacco: Never    Tobacco comments:     Quit 2010   Vaping Use    Vaping status: Never Used   Substance Use Topics    Alcohol use: Not Currently     Alcohol/week: 5.0 standard drinks of alcohol     Types: 5 Glasses of wine per week    Drug use: Never      E-Cigarette/Vaping    E-Cigarette Use Never User       E-Cigarette/Vaping Substances    Nicotine No     THC No     CBD No     Flavoring No     Other No     Unknown No       I have reviewed and agree with the history as documented.     74-year-old female with history of TIA several weeks ago, with new left sided weakness.  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 Her CTA on last admission did show a new subacute stroke in the front frontal region. \"Stable appearance of the treated left brain metastases. No new metastases.   Small foci of right frontal diffusion signal most compatible with subacute infarcts.\"  She states two days ago she noticed increasing left arm and leg weakness and feels off balance, veering to the right side.        History provided by:  Patient   used: No    Extremity Weakness      Review of Systems   Musculoskeletal:  Positive for extremity weakness.   Neurological:         Left side weakness   All other systems reviewed and are " negative.          Objective       ED Triage Vitals   Temperature Pulse Blood Pressure Respirations SpO2 Patient Position - Orthostatic VS   05/05/25 1022 05/05/25 1022 05/05/25 1022 05/05/25 1022 05/05/25 1022 05/05/25 1022   97.9 °F (36.6 °C) 88 125/75 20 95 % Sitting      Temp Source Heart Rate Source BP Location FiO2 (%) Pain Score    05/05/25 1022 05/05/25 1145 05/05/25 1022 -- 05/05/25 1022    Oral Monitor Right arm  No Pain      Vitals      Date and Time Temp Pulse SpO2 Resp BP Pain Score FACES Pain Rating User   05/06/25 2101 97.6 °F (36.4 °C) 83 94 % 18 119/78 -- -- DII   05/06/25 1600 -- 96 92 % 20 144/88 -- -- MM   05/06/25 1542 -- -- -- 21 -- -- -- LC   05/06/25 1542 97.6 °F (36.4 °C) 89 92 % -- 148/92 -- -- DII   05/06/25 1448 -- -- -- -- -- 3 -- DL   05/06/25 1010 -- -- -- -- -- No Pain -- VLS   05/06/25 0955 -- -- -- -- -- No Pain -- MNG   05/06/25 0733 97.9 °F (36.6 °C) 69 -- 18 134/78 No Pain -- RG   05/06/25 0630 -- 67 96 % 18 138/80 -- -- RF   05/06/25 0553 -- -- -- -- -- 5 -- RF   05/06/25 0230 -- 67 95 % 18 147/80 -- -- HJ   05/06/25 0050 -- 75 93 % 18 125/80 -- -- RF   05/05/25 2235 -- 81 95 % 18 123/66 -- -- RF   05/05/25 2030 -- 86 92 % 18 127/85 -- -- RF   05/05/25 1939 -- -- -- -- -- 6 -- RF   05/05/25 1830 97.9 °F (36.6 °C) 88 92 % 20 144/75 -- -- CM   05/05/25 1750 -- -- -- -- -- 4 -- MM   05/05/25 1730 98 °F (36.7 °C) 95 94 % 20 133/71 -- -- CM   05/05/25 1630 97.9 °F (36.6 °C) 77 96 % 20 145/67 -- -- CM   05/05/25 1530 97.9 °F (36.6 °C) 84 91 % 20 127/81 -- -- CM   05/05/25 1453 -- 90 96 % 18 154/77 No Pain -- MO   05/05/25 1300 -- -- -- -- -- No Pain -- JV   05/05/25 1202 -- -- -- -- -- No Pain -- JV   05/05/25 1145 -- 74 95 % 18 154/79 No Pain -- JV   05/05/25 1135 -- -- -- -- -- No Pain -- JV   05/05/25 1057 -- -- -- -- -- No Pain -- JV   05/05/25 1032 -- -- -- -- -- No Pain -- JV   05/05/25 1022 97.9 °F (36.6 °C) 88 95 % 20 125/75 No Pain -- RO            Physical Exam  Vitals and  nursing note reviewed.   Constitutional:       General: She is not in acute distress.     Appearance: Normal appearance. She is well-developed and normal weight. She is not ill-appearing, toxic-appearing or diaphoretic.   HENT:      Head: Normocephalic and atraumatic.      Right Ear: External ear normal.      Left Ear: External ear normal.      Nose: Nose normal.      Mouth/Throat:      Mouth: Mucous membranes are moist.      Pharynx: Oropharynx is clear.   Eyes:      General: No scleral icterus.     Conjunctiva/sclera: Conjunctivae normal.   Cardiovascular:      Rate and Rhythm: Normal rate and regular rhythm.      Pulses: Normal pulses.      Heart sounds: Normal heart sounds.   Pulmonary:      Effort: Pulmonary effort is normal.      Breath sounds: Normal breath sounds.   Abdominal:      General: Abdomen is flat. There is no distension.      Palpations: Abdomen is soft.      Tenderness: There is no abdominal tenderness.   Musculoskeletal:         General: Normal range of motion.      Cervical back: Normal range of motion.   Skin:     General: Skin is warm and dry.      Capillary Refill: Capillary refill takes less than 2 seconds.      Coloration: Skin is not jaundiced.   Neurological:      General: No focal deficit present.      Mental Status: She is alert and oriented to person, place, and time. Mental status is at baseline.      Cranial Nerves: No cranial nerve deficit.      Sensory: No sensory deficit.      Motor: Weakness present.      Coordination: Coordination abnormal.      Comments: + abnormal coordination w/ ataxia to the left arm and leg. L arm: 4/5 strength, L leg: 3/5 strength.   Sensation intact.   Psychiatric:         Mood and Affect: Mood normal.         Behavior: Behavior normal.         Results Reviewed       Procedure Component Value Units Date/Time    Lipid Panel with Direct LDL reflex [[id number]]  (Abnormal) Collected: 05/06/25 6387    Lab Status: Final result Specimen: Blood from Arm, Right  Updated: 05/06/25 0539     Cholesterol 141 mg/dL      Triglycerides 153 mg/dL      HDL, Direct 53 mg/dL      LDL Calculated 57 mg/dL     Hemoglobin A1c w/EAG Estimation [[id number]] Collected: 05/06/25 0508    Lab Status: In process Specimen: Blood from Arm, Right Updated: 05/06/25 0511    RBC Morphology Reflex Test [[id number]] Collected: 05/05/25 1044    Lab Status: Final result Specimen: Blood from Arm, Right Updated: 05/05/25 1201    CBC and differential [[id number]]  (Abnormal) Collected: 05/05/25 1044    Lab Status: Final result Specimen: Blood from Arm, Right Updated: 05/05/25 1129     WBC 14.24 Thousand/uL      RBC 3.70 Million/uL      Hemoglobin 10.6 g/dL      Hematocrit 34.5 %      MCV 93 fL      MCH 28.6 pg      MCHC 30.7 g/dL      RDW 17.0 %      MPV 9.4 fL      Platelets 428 Thousands/uL     Narrative:      This is an appended report.  These results have been appended to a previously verified report.    Manual Differential(PHLEBS Do Not Order) [[id number]]  (Abnormal) Collected: 05/05/25 1044    Lab Status: Final result Specimen: Blood from Arm, Right Updated: 05/05/25 1129     Segmented % 90 %      Lymphocytes % 2 %      Monocytes % 4 %      Eosinophils % 0 %      Basophils % 0 %      Metamyelocytes % 2 %      Myelocytes % 2 %      Absolute Neutrophils 12.82 Thousand/uL      Absolute Lymphocytes 0.28 Thousand/uL      Absolute Monocytes 0.57 Thousand/uL      Absolute Eosinophils 0.00 Thousand/uL      Absolute Basophils 0.00 Thousand/uL      Absolute Metamyelocytes 0.28 Thousand/uL      Absolute Myelocytes 0.28 Thousand/uL      Total Counted --     RBC Morphology Present     Platelet Estimate Increased     Large Platelet Present     Anisocytosis Present     Poikilocytes Present     Polychromasia Present    Protime-INR [[id number]]  (Normal) Collected: 05/05/25 1044    Lab Status: Final result Specimen: Blood from Arm, Right Updated: 05/05/25 1126     Protime 13.2 seconds      INR 0.94    Narrative:      INR  Therapeutic Range    Indication                                             INR Range      Atrial Fibrillation                                               2.0-3.0  Hypercoagulable State                                    2.0.2.3  Left Ventricular Asist Device                            2.0-3.0  Mechanical Heart Valve                                  -    Aortic(with afib, MI, embolism, HF, LA enlargement,    and/or coagulopathy)                                     2.0-3.0 (2.5-3.5)     Mitral                                                             2.5-3.5  Prosthetic/Bioprosthetic Heart Valve               2.0-3.0  Venous thromboembolism (VTE: VT, PE        2.0-3.0    APTT [[id number]]  (Normal) Collected: 05/05/25 1044    Lab Status: Final result Specimen: Blood from Arm, Right Updated: 05/05/25 1126     PTT 25 seconds     Narrative:      Verified by Repeat    Basic metabolic panel [[id number]]  (Abnormal) Collected: 05/05/25 1044    Lab Status: Final result Specimen: Blood from Arm, Left Updated: 05/05/25 1112     Sodium 138 mmol/L      Potassium 3.9 mmol/L      Chloride 104 mmol/L      CO2 25 mmol/L      ANION GAP 9 mmol/L      BUN 29 mg/dL      Creatinine 1.00 mg/dL      Glucose 111 mg/dL      Calcium 9.3 mg/dL      eGFR 55 ml/min/1.73sq m     Narrative:      National Kid[employer] guidelines for Chronic Kidney Disease (CKD):     Stage 1 with normal or high GFR (GFR > 90 mL/min/1.73 square meters)    Stage 2 Mild CKD (GFR = 60-89 mL/min/1.73 square meters)    Stage 3A Moderate CKD (GFR = 45-59 mL/min/1.73 square meters)    Stage 3B Moderate CKD (GFR = 30-44 mL/min/1.73 square meters)    Stage 4 Severe CKD (GFR = 15-29 mL/min/1.73 square meters)    Stage 5 End Stage CKD (GFR <15 mL/min/1.73 square meters)  Note: GFR calculation is accurate only with a steady state creatinine            MRI brain w wo contrast   Final Interpretation by Yobani Medrano MD (05/06 0811)      1.  Slight increase in size  of the peripherally enhancing lesions involving the left parietal occipital junction, and the parafalcine left parietal lobe, with stable surrounding vasogenic edema. These may represent evolving posttreatment changes, but    continued attention on subsequent studies is recommended.   2.  No new intracranial enhancing lesions noted.   3.  Stable foci of diffusion signal hyperintensity involving the right corona radiata and centrum semiovale white matter extending to the cortex of the right frontoparietal junction, without associated enhancement. While this may represent the sequela of    subacute infarcts, asymmetric toxic leukoencephalopathy from chemotherapy agent is also a consideration.   4.  Asymmetric susceptibility involving the cortex of the right pre and postcentral gyri, without associated enhancement, encephalomalacia or additional signal abnormalities. This is new when compared to prior studies from 2024, and may represent a    sequela of prior subarachnoid hemorrhage in this region, or sequela of prior insult.   5.  Moderate chronic white matter microangiopathic changes.      Workstation performed: [id number]         CTA head and neck with and without contrast   Final Interpretation by Raphael Baron MD (05/05 1213)      CT Brain:   - Known left occipital lobe metastatic mass with small focus of subacute hemorrhage, and diffuse perilesional vasogenic edema in left cerebral hemisphere (worse posteriorly) was better evaluated on MRI brain with and without contrast dated 4/21/2025. No    new acute intracranial abnormality. Consider follow-up MRI brain with and without contrast for further evaluation.      CT Angiography:   - Negative CTA head and neck for large vessel occlusion, dissection, aneurysm, or high-grade stenosis.   - Partially imaged known right upper lobe lung mass with adjacent paramediastinal fibrosis, small left upper lobe metastatic pulmonary nodule. Recommend follow-up dedicated CT  chest with contrast per oncologic recommendations.      Additional chronic/incidental findings as detailed above.      The study was marked in EPIC for immediate notification.                  Workstation performed: [id number]             Procedures    ED Medication and Procedure Management   Prior to Admission Medications   Prescriptions Last Dose Informant Patient Reported? Taking?   HYDROmorphone (DILAUDID) 2 mg tablet   No No   Sig: Take 1 tablet (2 mg total) by mouth every 4 (four) hours as needed for moderate pain Max Daily Amount: 12 mg   aluminum-magnesium hydroxide-simethicone (MAALOX) 9774-3286-324 mg/30 mL suspension   No No   Sig: Take 30 mL by mouth every 4 (four) hours as needed for indigestion or heartburn   amLODIPine (NORVASC) 5 mg tablet   No No   Sig: TAKE 1 TABLET(5 MG) BY MOUTH DAILY   aspirin 81 mg chewable tablet   No No   Sig: Chew 1 tablet (81 mg total) daily for 29 doses   atorvastatin (LIPITOR) 40 mg tablet   No No   Sig: Take 1 tablet (40 mg total) by mouth daily with dinner   dexamethasone (DECADRON) 2 mg tablet   No No   Sig: Take 1 tablet (2 mg total) by mouth 2 (two) times a day with meals   gabapentin (NEURONTIN) 100 mg capsule   No No   Sig: TAKE 1 CAPSULE BY MOUTH EVERY MORNING, 1 CAPSULE EVERY AFTERNOON AND 3 CAPSULES EVERY NIGHT AT BEDTIME   Patient taking differently: TAKE 2 CAPSULES BY MOUTH EVERY MORNING AND 3 CAPSULES EVERY NIGHT AT BEDTIME (patient did not change her regimen per doctor)   levETIRAcetam (KEPPRA) 1000 MG tablet   No No   Sig: TAKE 1 TABLET(1000 MG) BY MOUTH EVERY 12 HOURS   levothyroxine 50 mcg tablet   No No   Sig: TAKE 1 TABLET(50 MCG) BY MOUTH DAILY EARLY MORNING   ondansetron (ZOFRAN) 4 mg tablet   No No   Sig: Take 1 tablet (4 mg total) by mouth every 8 (eight) hours as needed for nausea or vomiting   pantoprazole (PROTONIX) 40 mg tablet   No No   Sig: Take 1 tablet (40 mg total) by mouth daily in the early morning   senna (SENOKOT) 8.6 MG tablet  Self Yes  No   Sig: Take 1 tablet by mouth daily Takes one every other day   sulfamethoxazole-trimethoprim (BACTRIM DS) 800-160 mg per tablet   No No   Sig: Take 1 tablet by mouth 3 (three) times a week Starting 12/24, you can take one tablet Monday, Wednesday, and Fridays.   vitamin B-12 (VITAMIN B-12) 1,000 mcg tablet  Self No No   Sig: Take 1 tablet (1,000 mcg total) by mouth daily      Facility-Administered Medications: None     Current Discharge Medication List        CONTINUE these medications which have NOT CHANGED    Details   aluminum-magnesium hydroxide-simethicone (MAALOX) 3601-9559-688 mg/30 mL suspension Take 30 mL by mouth every 4 (four) hours as needed for indigestion or heartburn  Qty: 355 mL, Refills: 0    Associated Diagnoses: Gastroesophageal reflux disease without esophagitis      amLODIPine (NORVASC) 5 mg tablet TAKE 1 TABLET(5 MG) BY MOUTH DAILY  Qty: 30 tablet, Refills: 5    Associated Diagnoses: Primary hypertension      aspirin 81 mg chewable tablet Chew 1 tablet (81 mg total) daily for 29 doses  Qty: 29 tablet, Refills: 0    Associated Diagnoses: Left-sided weakness      atorvastatin (LIPITOR) 40 mg tablet Take 1 tablet (40 mg total) by mouth daily with dinner  Qty: 30 tablet, Refills: 0    Associated Diagnoses: Left-sided weakness      dexamethasone (DECADRON) 2 mg tablet Take 1 tablet (2 mg total) by mouth 2 (two) times a day with meals  Qty: 180 tablet, Refills: 0    Associated Diagnoses: Complication of chemotherapy, initial encounter; Psoriatic arthritis (HCC)      gabapentin (NEURONTIN) 100 mg capsule TAKE 1 CAPSULE BY MOUTH EVERY MORNING, 1 CAPSULE EVERY AFTERNOON AND 3 CAPSULES EVERY NIGHT AT BEDTIME  Qty: 150 capsule, Refills: 5    Associated Diagnoses: Palliative care patient; Cancer related pain      HYDROmorphone (DILAUDID) 2 mg tablet Take 1 tablet (2 mg total) by mouth every 4 (four) hours as needed for moderate pain Max Daily Amount: 12 mg  Qty: 90 tablet, Refills: 0    Associated  Diagnoses: Metastatic cancer to brain (HCC); Metastatic adenocarcinoma (HCC); 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); Palliative care patient; Cancer associated pain      levETIRAcetam (KEPPRA) 1000 MG tablet TAKE 1 TABLET(1000 MG) BY MOUTH EVERY 12 HOURS  Qty: 60 tablet, Refills: 5    Associated Diagnoses: Metastatic cancer to brain (HCC)      levothyroxine 50 mcg tablet TAKE 1 TABLET(50 MCG) BY MOUTH DAILY EARLY MORNING  Qty: 30 tablet, Refills: 5    Associated Diagnoses: Acquired hypothyroidism      ondansetron (ZOFRAN) 4 mg tablet Take 1 tablet (4 mg total) by mouth every 8 (eight) hours as needed for nausea or vomiting  Qty: 30 tablet, Refills: 2    Associated Diagnoses: Complication of chemotherapy      pantoprazole (PROTONIX) 40 mg tablet Take 1 tablet (40 mg total) by mouth daily in the early morning  Qty: 30 tablet, Refills: 0    Associated Diagnoses: Metastatic cancer to brain (HCC)      senna (SENOKOT) 8.6 MG tablet Take 1 tablet by mouth daily Takes one every other day      sulfamethoxazole-trimethoprim (BACTRIM DS) 800-160 mg per tablet Take 1 tablet by mouth 3 (three) times a week Starting 12/24, you can take one tablet Monday, Wednesday, and Fridays.  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  ED SEPSIS DOCUMENTATION   Time reflects when diagnosis was documented in both MDM as applicable and the Disposition within this note       Time User Action Codes Description Comment    5/5/2025 12:49 PM Marlon Lira [R53.1] Acute left-sided weakness     5/5/2025  2:16 PM Sarah Lam [Z86.73] History of stroke     5/5/2025  2:16 PM Sarah Lam [R29.90] Stroke-like symptoms     5/6/2025  1:36 PM  Cheri Mills Add [G93.6] Vasogenic brain edema (HCC)                  Marlon Lira DO  05/06/25 2119

## 2025-05-05 NOTE — H&P
H&P - Hospitalist   Name: Ayla Nye 74 y.o. female I MRN: [id number]  Unit/Bed#: -01 I Date of Admission: 5/5/2025   Date of Service: 5/5/2025 I Hospital Day: 0     Assessment & Plan  Stroke-like symptoms  CT scan in ED shows -   CT Brain:  - Known left occipital lobe metastatic mass with small focus of subacute hemorrhage, and diffuse perilesional vasogenic edema in left cerebral hemisphere (worse posteriorly) was better evaluated on MRI brain with and without contrast dated 4/21/2025. No   new acute intracranial abnormality. Consider follow-up MRI brain with and without contrast for further evaluation.     CT Angiography:  - Negative CTA head and neck for large vessel occlusion, dissection, aneurysm, or high-grade stenosis.  - Partially imaged known right upper lobe lung mass with adjacent paramediastinal fibrosis, small left upper lobe metastatic pulmonary nodule. Recommend follow-up dedicated CT chest with contrast per oncologic recommendations.     Additional chronic/incidental findings as detailed above.    Discussed with ED who discussed with neurology and they are recommending MRI with and without contrast, stroke pathway.      Patient presenting with left-sided weakness approximately a week ago found to have infarct in the right MCA territory.  This is now gradually been getting worse and the patient specifically noted acutely worse today.    Will proceed with MRI with and without contrast as well as neurochecks and stroke pathway.  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  Anemia  Hemoglobin is 10, this is actually above baseline, to trend, likely in the setting of metastatic cancer.  Leukocytosis  Is on Decadron in the outpatient setting, we will trend CBC.  History of stroke  Patient was recently admitted with a stroke, MRI at the time (April 21) showed the following -  \"Stable appearance of the treated left brain metastases. No new metastases.     Small foci of right frontal diffusion signal most compatible with subacute infarcts.     Other stable findings above.\"    Will continue aspirin and Plavix, will await neurology input      VTE Pharmacologic Prophylaxis: VTE Score: 9 Moderate Risk (Score 3-4) - Pharmacological DVT Prophylaxis Ordered: heparin.  Code Status: Level 1 - Full Code   Discussion with family: Updated  () at bedside.    Anticipated Length of Stay: Patient will be admitted on an inpatient basis with an anticipated length of stay of greater than 2 midnights secondary to stroke and brain mets..    History of Present Illness   Chief Complaint: worsening left sided weakness.     Ayla Nye is a 74 y.o. female with a PMH of metastatic lung cancer, just recently admitted here for left-sided weakness found to have right MCA territory frontal lobe stroke, who presents with left-sided weakness.    He has findings as outlined above in problem list.    We are getting an MRI, initially neurology had recommended with and without contrast however now we have changed this to without given that the patient had with and without last week.  Admit her on the stroke pathway continue Decadron aspirin and Plavix.    She does have a history of PE that she is off anticoagulation because of her brain mets and slight hemorrhagic pain just seen on previous imaging as well as CTA a today.        Review of Systems   Constitutional: Negative.  Negative for activity change, appetite change, chills, diaphoresis, fatigue, fever and unexpected weight " change.   HENT: Negative.  Negative for congestion, dental problem, drooling, ear discharge, ear pain, facial swelling and hearing loss.    Eyes: Negative.    Respiratory: Negative.     Cardiovascular: Negative.    Gastrointestinal: Negative.    Endocrine: Negative.    Genitourinary: Negative.    Musculoskeletal: Negative.    Allergic/Immunologic: Negative.    Neurological:  Positive for dizziness and weakness (left sided weakness).   Hematological: Negative.    Psychiatric/Behavioral: Negative.         Historical Information   Past Medical History:   Diagnosis Date    DILLON (acute kidney injury) (HCC) 02/14/2025    Allergic 2022    Allergic to neomiacin and cephalexin    Cancer (HCC)     lung cancer    Cancer (HCC)     mets to brain    Cancer (HCC)     perianal mass    Cataract Nov. 2023    Due to have durgery June 2024    Essential thrombocytosis (HCC)     controlled with medication    History of transfusion     Hyperlipidemia 2015    Hypertension 2011    Mass in chest     Nodular goiter     Osteoporosis     Peripheral neuropathy     Pneumonia Oct 16, 2023    Also  Feb 2024    Psoriasis     SIRS (systemic inflammatory response syndrome) (HCC) 06/22/2024     Past Surgical History:   Procedure Laterality Date    APPENDECTOMY  1954    BREAST CYST EXCISION Right     COLONOSCOPY  10/31/2018    DXA PROCEDURE (HISTORICAL)  04/21/2017    IR BIOPSY OTHER  09/12/2024    IR LUMBAR PUNCTURE  09/12/2024    MAMMO (HISTORICAL)      8-24-18    MAMMO NEEDLE LOCALIZATION RIGHT (ALL INC) Right 07/13/2009    SKIN LESION EXCISION      bridge of nose     Social History     Tobacco Use    Smoking status: Former     Current packs/day: 1.00     Average packs/day: 1 pack/day for 59.3 years (59.3 ttl pk-yrs)     Types: Cigarettes     Start date: 1966     Passive exposure: Past    Smokeless tobacco: Never    Tobacco comments:     Quit 2010   Vaping Use    Vaping status: Never Used   Substance and Sexual Activity    Alcohol use: Not Currently      Alcohol/week: 5.0 standard drinks of alcohol     Types: 5 Glasses of wine per week    Drug use: Never    Sexual activity: Not Currently     Partners: Male     Birth control/protection: Post-menopausal     E-Cigarette/Vaping    E-Cigarette Use Never User      E-Cigarette/Vaping Substances    Nicotine No     THC No     CBD No     Flavoring No     Other No     Unknown No      Family History   Problem Relation Age of Onset    Stroke Mother     Depression Mother             Hypertension Mother     Hearing loss Mother     Tuberculosis Father     Cancer Brother         lung    Tuberculosis Brother     Coronary artery disease Brother     Hypertension Brother     Heart disease Brother     No Known Problems Daughter     No Known Problems Daughter     No Known Problems Maternal Grandmother     No Known Problems Maternal Grandfather     No Known Problems Paternal Grandmother     No Known Problems Paternal Grandfather     No Known Problems Maternal Aunt     No Known Problems Maternal Aunt     No Known Problems Maternal Aunt     No Known Problems Maternal Aunt     No Known Problems Paternal Aunt     Cancer Brother         Throat cancer     Social History:  Marital Status:    Occupation: retired/   Patient Pre-hospital Living Situation: Home  Patient Pre-hospital Level of Mobility: walks  Patient Pre-hospital Diet Restrictions: mpme/     Meds/Allergies   I have reviewed home medications with patient personally.  Prior to Admission medications    Medication Sig Start Date End Date Taking? Authorizing Provider   aluminum-magnesium hydroxide-simethicone (MAALOX) 6231-3622-241 mg/30 mL suspension Take 30 mL by mouth every 4 (four) hours as needed for indigestion or heartburn 25  Jaci Chu MD   amLODIPine (NORVASC) 5 mg tablet TAKE 1 TABLET(5 MG) BY MOUTH DAILY 25   Rafy Angelo MD   aspirin 81 mg chewable tablet Chew 1 tablet (81 mg total) daily for 29 doses 25  Jaci Chu MD    atorvastatin (LIPITOR) 40 mg tablet Take 1 tablet (40 mg total) by mouth daily with dinner 4/25/25   Jaci Chu MD   dexamethasone (DECADRON) 2 mg tablet Take 1 tablet (2 mg total) by mouth 2 (two) times a day with meals 3/13/25   Rafy Angelo MD   gabapentin (NEURONTIN) 100 mg capsule TAKE 1 CAPSULE BY MOUTH EVERY MORNING, 1 CAPSULE EVERY AFTERNOON AND 3 CAPSULES EVERY NIGHT AT BEDTIME  Patient taking differently: TAKE 2 CAPSULES BY MOUTH EVERY MORNING AND 3 CAPSULES EVERY NIGHT AT BEDTIME (patient did not change her regimen per doctor) 1/13/25   ZULEYMA Pollard   HYDROmorphone (DILAUDID) 2 mg tablet Take 1 tablet (2 mg total) by mouth every 4 (four) hours as needed for moderate pain Max Daily Amount: 12 mg 3/19/25   ZULEYMA Pollard   levETIRAcetam (KEPPRA) 1000 MG tablet TAKE 1 TABLET(1000 MG) BY MOUTH EVERY 12 HOURS 3/27/25   Rafy Angelo MD   levothyroxine 50 mcg tablet TAKE 1 TABLET(50 MCG) BY MOUTH DAILY EARLY MORNING 4/2/25   Rafy Angelo MD   ondansetron (ZOFRAN) 4 mg tablet Take 1 tablet (4 mg total) by mouth every 8 (eight) hours as needed for nausea or vomiting 2/19/25   Nimco Patel DO   pantoprazole (PROTONIX) 40 mg tablet Take 1 tablet (40 mg total) by mouth daily in the early morning 4/8/25 5/8/25  Rafy Angelo MD   senna (SENOKOT) 8.6 MG tablet Take 1 tablet by mouth daily Takes one every other day    Historical Provider, MD   sulfamethoxazole-trimethoprim (BACTRIM DS) 800-160 mg per tablet Take 1 tablet by mouth 3 (three) times a week Starting 12/24, you can take one tablet Monday, Wednesday, and Fridays. 1/29/25 7/28/25  Destiny Solo MD   vitamin B-12 (VITAMIN B-12) 1,000 mcg tablet Take 1 tablet (1,000 mcg total) by mouth daily 9/14/23   ZULEYMA Boland     Allergies   Allergen Reactions    Doxycycline Headache    Keflex [Cephalexin] Rash    Neomycin-Polymyxin-Dexameth Eye Swelling       Objective :  Temp:  [97.9 °F (36.6 °C)] 97.9 °F  (36.6 °C)  HR:  [74-90] 84  BP: (125-154)/(75-81) 127/81  Resp:  [18-20] 20  SpO2:  [91 %-96 %] 91 %  O2 Device: None (Room air)    Physical Exam  Constitutional:       General: She is not in acute distress.     Appearance: She is not ill-appearing, toxic-appearing or diaphoretic.   HENT:      Head: Normocephalic.      Mouth/Throat:      Mouth: Mucous membranes are moist.   Eyes:      Pupils: Pupils are equal, round, and reactive to light.   Cardiovascular:      Rate and Rhythm: Normal rate.      Heart sounds: No murmur heard.     No friction rub. No gallop.   Pulmonary:      Effort: Pulmonary effort is normal. No respiratory distress.      Breath sounds: No stridor. No wheezing, rhonchi or rales.   Chest:      Chest wall: No tenderness.   Abdominal:      General: Abdomen is flat. There is no distension.      Palpations: There is no mass.      Tenderness: There is no abdominal tenderness. There is no right CVA tenderness, left CVA tenderness, guarding or rebound.      Hernia: No hernia is present.   Musculoskeletal:      Right lower leg: No edema.   Skin:     Coloration: Skin is pale. Skin is not jaundiced.      Findings: No bruising, erythema, lesion or rash.   Neurological:      Mental Status: She is alert and oriented to person, place, and time.      Cranial Nerves: No cranial nerve deficit.      Sensory: No sensory deficit.      Motor: No weakness (left UE and LE weakness.).      Coordination: Coordination abnormal.      Gait: Gait normal.      Deep Tendon Reflexes: Reflexes abnormal.   Psychiatric:         Mood and Affect: Mood normal.          Lines/Drains:            Lab Results: I have reviewed the following results:  Results from last 7 days   Lab Units 05/05/25  1044 05/01/25  1041   WBC Thousand/uL 14.24* 13.36*   HEMOGLOBIN g/dL 10.6* 9.5*   HEMATOCRIT % 34.5* 32.3*   PLATELETS Thousands/uL 428* 404*   BANDS PCT %  --  3   LYMPHO PCT % 2* 0*   MONO PCT % 4 2*   EOS PCT % 0 0     Results from last 7 days    Lab Units 05/05/25  1044 05/01/25  1041   SODIUM mmol/L 138 144   POTASSIUM mmol/L 3.9 4.4   CHLORIDE mmol/L 104 107   CO2 mmol/L 25 26   BUN mg/dL 29* 25   CREATININE mg/dL 1.00 0.96   ANION GAP mmol/L 9 11   CALCIUM mg/dL 9.3 9.5   ALBUMIN g/dL  --  4.0   TOTAL BILIRUBIN mg/dL  --  0.27   ALK PHOS U/L  --  59   ALT U/L  --  10   AST U/L  --  10*   GLUCOSE RANDOM mg/dL 111 82     Results from last 7 days   Lab Units 05/05/25  1044   INR  0.94         Lab Results   Component Value Date    HGBA1C 5.6 04/23/2025    HGBA1C 5.3 07/30/2024           Imaging Results Review: No pertinent imaging studies reviewed.  Other Study Results Review: No additional pertinent studies reviewed.    Administrative Statements   I have spent a total time of 90 minutes in caring for this patient on the day of the visit/encounter including Diagnostic results, Prognosis, Risks and benefits of tx options, Instructions for management, Patient and family education, Importance of tx compliance, Risk factor reductions, Impressions, Counseling / Coordination of care, Documenting in the medical record, Reviewing/placing orders in the medical record (including tests, medications, and/or procedures), Obtaining or reviewing history  , and Communicating with other healthcare professionals .    ** Please Note: This note has been constructed using a voice recognition system. **

## 2025-05-05 NOTE — ASSESSMENT & PLAN NOTE
Will await MRI brain, new lesions noted may need reevaluation by med unk versus radiation oncology however for now we will hold off on any consultation.

## 2025-05-06 ENCOUNTER — TELEPHONE (OUTPATIENT)
Dept: RADIATION ONCOLOGY | Facility: HOSPITAL | Age: 75
End: 2025-05-06

## 2025-05-06 ENCOUNTER — APPOINTMENT (INPATIENT)
Dept: MRI IMAGING | Facility: HOSPITAL | Age: 75
DRG: 056 | End: 2025-05-06
Payer: MEDICARE

## 2025-05-06 PROBLEM — R93.89 ABNORMAL CT OF THE CHEST: Status: ACTIVE | Noted: 2025-05-06

## 2025-05-06 PROBLEM — G93.6 VASOGENIC BRAIN EDEMA (HCC): Status: ACTIVE | Noted: 2025-05-06

## 2025-05-06 LAB
ANION GAP SERPL CALCULATED.3IONS-SCNC: 9 MMOL/L (ref 4–13)
BUN SERPL-MCNC: 21 MG/DL (ref 5–25)
CALCIUM SERPL-MCNC: 9.1 MG/DL (ref 8.4–10.2)
CHLORIDE SERPL-SCNC: 107 MMOL/L (ref 96–108)
CHOLEST SERPL-MCNC: 141 MG/DL (ref ?–200)
CO2 SERPL-SCNC: 23 MMOL/L (ref 21–32)
CREAT SERPL-MCNC: 0.77 MG/DL (ref 0.6–1.3)
ERYTHROCYTE [DISTWIDTH] IN BLOOD BY AUTOMATED COUNT: 17 % (ref 11.6–15.1)
GFR SERPL CREATININE-BSD FRML MDRD: 76 ML/MIN/1.73SQ M
GLUCOSE SERPL-MCNC: 88 MG/DL (ref 65–140)
HCT VFR BLD AUTO: 33.2 % (ref 34.8–46.1)
HDLC SERPL-MCNC: 53 MG/DL
HGB BLD-MCNC: 9.9 G/DL (ref 11.5–15.4)
LDLC SERPL CALC-MCNC: 57 MG/DL (ref 0–100)
MAGNESIUM SERPL-MCNC: 2.2 MG/DL (ref 1.9–2.7)
MCH RBC QN AUTO: 27.7 PG (ref 26.8–34.3)
MCHC RBC AUTO-ENTMCNC: 29.8 G/DL (ref 31.4–37.4)
MCV RBC AUTO: 93 FL (ref 82–98)
PLATELET # BLD AUTO: 363 THOUSANDS/UL (ref 149–390)
PMV BLD AUTO: 9 FL (ref 8.9–12.7)
POTASSIUM SERPL-SCNC: 4.4 MMOL/L (ref 3.5–5.3)
RBC # BLD AUTO: 3.58 MILLION/UL (ref 3.81–5.12)
SODIUM SERPL-SCNC: 139 MMOL/L (ref 135–147)
TRIGL SERPL-MCNC: 153 MG/DL (ref ?–150)
WBC # BLD AUTO: 13.47 THOUSAND/UL (ref 4.31–10.16)

## 2025-05-06 PROCEDURE — 99233 SBSQ HOSP IP/OBS HIGH 50: CPT | Performed by: PSYCHIATRY & NEUROLOGY

## 2025-05-06 PROCEDURE — 97163 PT EVAL HIGH COMPLEX 45 MIN: CPT

## 2025-05-06 PROCEDURE — 80061 LIPID PANEL: CPT | Performed by: INTERNAL MEDICINE

## 2025-05-06 PROCEDURE — 70553 MRI BRAIN STEM W/O & W/DYE: CPT

## 2025-05-06 PROCEDURE — 83036 HEMOGLOBIN GLYCOSYLATED A1C: CPT | Performed by: INTERNAL MEDICINE

## 2025-05-06 PROCEDURE — 99232 SBSQ HOSP IP/OBS MODERATE 35: CPT | Performed by: PHYSICIAN ASSISTANT

## 2025-05-06 PROCEDURE — A9585 GADOBUTROL INJECTION: HCPCS

## 2025-05-06 PROCEDURE — 97167 OT EVAL HIGH COMPLEX 60 MIN: CPT

## 2025-05-06 PROCEDURE — 80048 BASIC METABOLIC PNL TOTAL CA: CPT | Performed by: PHYSICIAN ASSISTANT

## 2025-05-06 PROCEDURE — 85027 COMPLETE CBC AUTOMATED: CPT | Performed by: PHYSICIAN ASSISTANT

## 2025-05-06 PROCEDURE — 83735 ASSAY OF MAGNESIUM: CPT | Performed by: PHYSICIAN ASSISTANT

## 2025-05-06 RX ORDER — DEXAMETHASONE 2 MG/1
4 TABLET ORAL EVERY 8 HOURS SCHEDULED
Status: DISCONTINUED | OUTPATIENT
Start: 2025-05-07 | End: 2025-05-06

## 2025-05-06 RX ORDER — DEXAMETHASONE 2 MG/1
2 TABLET ORAL EVERY 12 HOURS SCHEDULED
Status: DISCONTINUED | OUTPATIENT
Start: 2025-05-13 | End: 2025-05-06

## 2025-05-06 RX ORDER — DEXAMETHASONE 2 MG/1
2 TABLET ORAL EVERY 8 HOURS SCHEDULED
Status: DISCONTINUED | OUTPATIENT
Start: 2025-05-10 | End: 2025-05-06

## 2025-05-06 RX ORDER — DEXAMETHASONE 2 MG/1
2 TABLET ORAL EVERY 8 HOURS SCHEDULED
Status: DISCONTINUED | OUTPATIENT
Start: 2025-05-10 | End: 2025-05-07 | Stop reason: HOSPADM

## 2025-05-06 RX ORDER — QUETIAPINE FUMARATE 25 MG/1
12.5 TABLET, FILM COATED ORAL
Status: DISCONTINUED | OUTPATIENT
Start: 2025-05-06 | End: 2025-05-07 | Stop reason: HOSPADM

## 2025-05-06 RX ORDER — DEXAMETHASONE 2 MG/1
4 TABLET ORAL EVERY 12 HOURS SCHEDULED
Status: DISCONTINUED | OUTPATIENT
Start: 2025-05-07 | End: 2025-05-06

## 2025-05-06 RX ORDER — QUETIAPINE FUMARATE 25 MG/1
12.5 TABLET, FILM COATED ORAL
Qty: 15 TABLET | Refills: 0 | Status: CANCELLED | OUTPATIENT
Start: 2025-05-06 | End: 2025-06-05

## 2025-05-06 RX ORDER — DEXAMETHASONE 2 MG/1
6 TABLET ORAL DAILY
Status: DISCONTINUED | OUTPATIENT
Start: 2025-05-10 | End: 2025-05-06

## 2025-05-06 RX ORDER — DEXAMETHASONE 4 MG/1
4 TABLET ORAL EVERY 12 HOURS SCHEDULED
Status: DISCONTINUED | OUTPATIENT
Start: 2025-05-07 | End: 2025-05-07 | Stop reason: HOSPADM

## 2025-05-06 RX ORDER — DEXAMETHASONE 2 MG/1
8 TABLET ORAL DAILY
Status: COMPLETED | OUTPATIENT
Start: 2025-05-06 | End: 2025-05-06

## 2025-05-06 RX ORDER — GADOBUTROL 604.72 MG/ML
5 INJECTION INTRAVENOUS
Status: COMPLETED | OUTPATIENT
Start: 2025-05-06 | End: 2025-05-06

## 2025-05-06 RX ADMIN — ASPIRIN 81 MG CHEWABLE TABLET 81 MG: 81 TABLET CHEWABLE at 08:19

## 2025-05-06 RX ADMIN — ALUMINUM HYDROXIDE, MAGNESIUM HYDROXIDE, AND DIMETHICONE 30 ML: 200; 20; 200 SUSPENSION ORAL at 14:48

## 2025-05-06 RX ADMIN — LEVETIRACETAM 1000 MG: 500 TABLET, FILM COATED ORAL at 08:20

## 2025-05-06 RX ADMIN — DEXAMETHASONE 2 MG: 2 TABLET ORAL at 08:20

## 2025-05-06 RX ADMIN — GABAPENTIN 200 MG: 100 CAPSULE ORAL at 08:20

## 2025-05-06 RX ADMIN — LEVOTHYROXINE SODIUM 50 MCG: 0.05 TABLET ORAL at 05:46

## 2025-05-06 RX ADMIN — QUETIAPINE FUMARATE 12.5 MG: 25 TABLET ORAL at 21:11

## 2025-05-06 RX ADMIN — CYANOCOBALAMIN TAB 500 MCG 1000 MCG: 500 TAB at 08:20

## 2025-05-06 RX ADMIN — SENNOSIDES 8.6 MG: 8.6 TABLET, FILM COATED ORAL at 17:02

## 2025-05-06 RX ADMIN — DEXAMETHASONE 8 MG: 2 TABLET ORAL at 12:42

## 2025-05-06 RX ADMIN — GADOBUTROL 5 ML: 604.72 INJECTION INTRAVENOUS at 00:50

## 2025-05-06 RX ADMIN — ACETAMINOPHEN 975 MG: 325 TABLET, FILM COATED ORAL at 21:11

## 2025-05-06 RX ADMIN — ACETAMINOPHEN 975 MG: 325 TABLET, FILM COATED ORAL at 05:53

## 2025-05-06 RX ADMIN — LEVETIRACETAM 1000 MG: 500 TABLET, FILM COATED ORAL at 21:11

## 2025-05-06 RX ADMIN — ATORVASTATIN CALCIUM 40 MG: 40 TABLET, FILM COATED ORAL at 17:02

## 2025-05-06 RX ADMIN — Medication 3 MG: at 22:59

## 2025-05-06 RX ADMIN — PANTOPRAZOLE SODIUM 40 MG: 40 TABLET, DELAYED RELEASE ORAL at 05:46

## 2025-05-06 RX ADMIN — ACETAMINOPHEN 975 MG: 325 TABLET, FILM COATED ORAL at 14:48

## 2025-05-06 RX ADMIN — GABAPENTIN 300 MG: 300 CAPSULE ORAL at 21:11

## 2025-05-06 NOTE — ASSESSMENT & PLAN NOTE
Recent Labs     05/05/25  1044   HGB 10.6*     CBC pending   Trend   In setting of chronic disease

## 2025-05-06 NOTE — PLAN OF CARE
Problem: PAIN - ADULT  Goal: Verbalizes/displays adequate comfort level or baseline comfort level  Description: Interventions:- Encourage patient to monitor pain and request assistance- Assess pain using appropriate pain scale- Administer analgesics based on type and severity of pain and evaluate response- Implement non-pharmacological measures as appropriate and evaluate response- Consider cultural and social influences on pain and pain management- Notify physician/advanced practitioner if interventions unsuccessful or patient reports new pain  Outcome: Progressing     Problem: INFECTION - ADULT  Goal: Absence or prevention of progression during hospitalization  Description: INTERVENTIONS:- Assess and monitor for signs and symptoms of infection- Monitor lab/diagnostic results- Monitor all insertion sites, i.e. indwelling lines, tubes, and drains- Monitor endotracheal if appropriate and nasal secretions for changes in amount and color- [place] appropriate cooling/warming therapies per order- Administer medications as ordered- Instruct and encourage patient and family to use good hand hygiene technique- Identify and instruct in appropriate isolation precautions for identified infection/condition  Outcome: Progressing  Goal: Absence of fever/infection during neutropenic period  Description: INTERVENTIONS:- Monitor WBC  Outcome: Progressing     Problem: SAFETY ADULT  Goal: Patient will remain free of falls  Description: INTERVENTIONS:- Educate patient/family on patient safety including physical limitations- Instruct patient to call for assistance with activity - Consult OT/PT to assist with strengthening/mobility - Keep Call bell within reach- Keep bed low and locked with side rails adjusted as appropriate- Keep care items and personal belongings within reach- Initiate and maintain comfort rounds- Make Fall Risk Sign visible to staff- Offer Toileting every 2 Hours, in advance of need- Initiate/Maintain bed and  chair alarm- Obtain necessary fall risk management equipment: - Apply yellow socks and bracelet for high fall risk patients- Consider moving patient to room near nurses station  Outcome: Progressing  Goal: Maintain or return to baseline ADL function  Description: INTERVENTIONS:-  Assess patient's ability to carry out ADLs; assess patient's baseline for ADL function and identify physical deficits which impact ability to perform ADLs (bathing, care of mouth/teeth, toileting, grooming, dressing, etc.)- Assess/evaluate cause of self-care deficits - Assess range of motion- Assess patient's mobility; develop plan if impaired- Assess patient's need for assistive devices and provide as appropriate- Encourage maximum independence but intervene and supervise when necessary- Involve family in performance of ADLs- Assess for home care needs following discharge - Consider OT consult to assist with ADL evaluation and planning for discharge- Provide patient education as appropriate  Outcome: Progressing  Goal: Maintains/Returns to pre admission functional level  Description: INTERVENTIONS:- Perform AM-PAC 6 Click Basic Mobility/ Daily Activity assessment daily.- Set and communicate daily mobility goal to care team and patient/family/caregiver. - Collaborate with rehabilitation services on mobility goals if consulted- Perform Range of Motion 3 times a day.- Reposition patient every 2 hours.- Dangle patient 3 times a day- Stand patient 3 times a day- Ambulate patient 3 times a day- Out of bed to chair 3 times a day - Out of bed for meals 3 times a day- Out of bed for toileting- Record patient progress and toleration of activity level   Outcome: Progressing     Problem: DISCHARGE PLANNING  Goal: Discharge to home or other facility with appropriate resources  Description: INTERVENTIONS:- Identify barriers to discharge w/patient and caregiver- Arrange for needed discharge resources and transportation as appropriate- Identify discharge  learning needs (meds, wound care, etc.)- Arrange for interpretive services to assist at discharge as needed- Refer to Case Management Department for coordinating discharge planning if the patient needs post-hospital services based on physician/advanced practitioner order or complex needs related to functional status, cognitive ability, or social support system  Outcome: Progressing     Problem: Knowledge Deficit  Goal: Patient/family/caregiver demonstrates understanding of disease process, treatment plan, medications, and discharge instructions  Description: Complete learning assessment and assess knowledge base.Interventions:- Provide teaching at level of understanding- Provide teaching via preferred learning methods  Outcome: Progressing     Problem: Neurological Deficit  Goal: Neurological status is stable or improving  Description: Interventions:- Monitor and assess patient's level of consciousness, motor function, sensory function, and level of assistance needed for ADLs. - Monitor and report changes from baseline. Collaborate with interdisciplinary team to initiate plan and implement interventions as ordered. - Provide and maintain a safe environment.- Consider seizure precautions.- Consider fall precautions.- Consider aspiration precautions.- Consider bleeding precautions.  Outcome: Progressing     Problem: Activity Intolerance/Impaired Mobility  Goal: Mobility/activity is maintained at optimum level for patient  Description: Interventions:- Assess and monitor patient  barriers to mobility and need for assistive/adaptive devices.- Assess patient's emotional response to limitations.- Collaborate with interdisciplinary team and initiate plans and interventions as ordered.- Encourage independent activity per ability.- Maintain proper body alignment.- Perform active/passive rom as tolerated/ordered.- Plan activities to conserve energy.- Turn patient as appropriate  Outcome: Progressing     Problem: Communication  Impairment  Goal: Ability to express needs and understand communication  Description: Assess patient's communication skills and ability to understand information.  Patient will demonstrate use of effective communication techniques, alternative methods of communication and understanding even if not able to speak. - Encourage communication and provide alternate methods of communication as needed.- Collaborate with case management/ for discharge needs.- Include patient/family/caregiver in decisions related to communication.  Outcome: Progressing     Problem: Potential for Aspiration  Goal: Non-ventilated patient's risk of aspiration is minimized  Description: Assess and monitor vital signs, respiratory status, and labs (WBC).  Monitor for signs of aspiration (tachypnea, cough, rales, wheezing, cyanosis, fever).- Assess and monitor patient's ability to swallow.- Place patient up in chair to eat if possible.- HOB up at 90 degrees to eat if unable to get patient up into chair.- Supervise patient during oral intake. - Instruct patient/ family to take small bites.- Instruct patient/ family to take small single sips when taking liquids.- Follow patient-specific strategies generated by speech pathologist.  Outcome: Progressing  Goal: Ventilated patient's risk of aspiration is minimized  Description: Assess and monitor vital signs, respiratory status, airway cuff pressure, and labs (WBC).  Monitor for signs of aspiration (tachypnea, cough, rales, wheezing, cyanosis, fever).- Elevate head of bed 30 degrees if patient has tube feeding.- Monitor tube feeding.  Outcome: Progressing     Problem: Nutrition  Goal: Nutrition/Hydration status is improving  Description: Monitor and assess patient's nutrition/hydration status for malnutrition (ex- brittle hair, bruises, dry skin, pale skin and conjunctiva, muscle wasting, smooth red tongue, and disorientation). Collaborate with interdisciplinary team and initiate plan and  interventions as ordered.  Monitor patient's weight and dietary intake as ordered or per policy. Utilize nutrition screening tool and intervene per policy. Determine patient's food preferences and provide high-protein, high-caloric foods as appropriate. - Assist patient with eating.- Allow adequate time for meals.- Encourage patient to take dietary supplement as ordered.- Collaborate with clinical nutritionist.- Include patient/family/caregiver in decisions related to nutrition.  Outcome: Progressing     Problem: Nutrition/Hydration-ADULT  Goal: Nutrient/Hydration intake appropriate for improving, restoring or maintaining nutritional needs  Description: Monitor and assess patient's nutrition/hydration status for malnutrition. Collaborate with interdisciplinary team and initiate plan and interventions as ordered.  Monitor patient's weight and dietary intake as ordered or per policy. Utilize nutrition screening tool and intervene as necessary. Determine patient's food preferences and provide high-protein, high-caloric foods as appropriate. INTERVENTIONS:- Monitor oral intake, urinary output, labs, and treatment plans- Assess nutrition and hydration status and recommend course of action- Evaluate amount of meals eaten- Assist patient with eating if necessary - Allow adequate time for meals- Recommend/ encourage appropriate diets, oral nutritional supplements, and vitamin/mineral supplements- Order, calculate, and assess calorie counts as needed- Recommend, monitor, and adjust tube feedings and TPN/PPN based on assessed needs- Assess need for intravenous fluids- Provide specific nutrition/hydration education as appropriate- Include patient/family/caregiver in decisions related to nutrition  Outcome: Progressing

## 2025-05-06 NOTE — PROGRESS NOTES
Progress Note - Neurology   Name: Ayla Nye 74 y.o. female I MRN: [id number]  Unit/Bed#: -01 I Date of Admission: 5/5/2025   Date of Service: 5/6/2025 I Hospital Day: 1      Stroke-Like Symptoms: Patient with history of lung adenocarcinoma with brain mets (s/p chemo, radiation, immunotherapy, and stereotactic brain radiation Aug 2024 and Jan 2025) is presenting with worsening left upper extremity and left lower extremity weakness starting on Saturday mid day. Had prior hospitalization April 2025 for similar symptoms which she states have been stable until Saturday.  During that visit, brain MRI showed stable appearance of the treated left brain metastasis and new metastases, as well as a small foci of right frontal diffusion signal that was most compatible with subacute infarcts.  Patient's neuro exam showing 4/5 LUE weakness, 4/5 LLE weakness, and abnormal cerebellar signs (quick repetitive movements exam), as well as possible mild right sided visual field deficit.  CTA head and neck showing known left occipital lobe metastatic mass, small foci of subacute hemorrhage, and diffuse perilesional vasogenic edema in the left cerebral hemisphere (worse posteriorly), but no new acute intracranial abnormality.  CTA head and neck was negative for any LVO, dissection, aneurysm or high-grade stenosis.  Suspecting that this presentation could be possibly due to worsening metastasis, vasogenic edema, hemorrhage, versus stroke.  MRI brain done with and without contrast, showing a slight increase in the size of the peripherally enhancing lesions in the left parietal occipital junction and parafalcine left parietal lobe with stable surrounding vasogenic edema.  There is also stable foci of diffusion signal hyperintensity of the right coronary radiata and centrum semiovale white matter extending to the cortex of the right frontoparietal junction without enhancement, that could represent the sequelae of subacute  infarcts versus toxic local cephalopathy from chemotherapy.  A new asymmetric susceptibility involving the cortex of the right pre and post central gyri without enhancement, encephalomalacia, or signal abnormalities, which is new from prior studies in 2024 that could suggest sequela of prior subarachnoid hemorrhage or prior insult.  Recommending that the patient get evaluated by radiation oncology for that increase in size left parietal occipital lesion and vasogenic edema to determine whether she would benefit from another course of stereotactic radiation to the brain  For the vasogenic edema, patient is already on Decadron 2 mg twice daily, and we recommend 10 mg of Decadron today, followed by 4 mg 3 times daily for 3 days, then 2 mg 3 times daily, and then can go back to her home dose of 2 mg twice daily  Monitor neurological exam  PT OT recommendations  Patient had an echocardiogram last month, does not need to repeat  Lipid panel last admission showing   A1c last admission normal  Telemetry monitoring  Right frontal subacute infarct: Seen on MRI brain from April 2025.  During this admission, patient had presented with left upper extremity and left lower extremity weakness 3 out of 5.  She was started on aspirin and Lipitor.  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 Chou at [facility].  Completed systemic chemotherapy with carboplatin and pemetrexed, most recently January 2025.  She has a history of brain mets to the left occipital lobe and has had stereotactic brain radiation in August 2024 in January 2025.  She has been on steroids with Decadron, currently 2 mg daily.  Most recent imaging MRI April 21, 2025 showing stable appearance of treated left brain metastasis but no new metastasis.  History of pulmonary embolism: In the setting of lung adenocarcinoma.  Was previously on Xarelto, but this was  discontinued after a new foci of hemorrhage within the left occipital metastasis was found last admission.  She has since only been on aspirin daily.  Hyperlipidemia: Lipitor 40 mg daily  Hypothyroidism: Levothyroxine 50 mcg daily  Hypertension: On amlodipine 5 mg daily at home, per primary team.    Subjective   Patient states she feels okay.  She states that her symptoms are about the same as yesterday with the left upper extremity and left lower extremity weakness.  Her blurry vision of the right eye is the same as yesterday.  No other complaints at this time.    Review of Systems   Constitutional:  Negative for chills and fever.   HENT:  Negative for ear pain and sore throat.    Eyes:  Positive for visual disturbance. Negative for pain.   Respiratory:  Negative for cough and shortness of breath.    Cardiovascular:  Negative for chest pain and palpitations.   Gastrointestinal:  Negative for abdominal pain and vomiting.   Genitourinary:  Negative for dysuria and hematuria.   Musculoskeletal:  Negative for arthralgias and back pain.   Skin:  Negative for color change and rash.   Neurological:  Positive for weakness. Negative for seizures and syncope.   All other systems reviewed and are negative.        Objective :  Temp:  [97.9 °F (36.6 °C)-98 °F (36.7 °C)] 97.9 °F (36.6 °C)  HR:  [67-95] 69  BP: (123-154)/(66-85) 134/78  Resp:  [18-20] 18  SpO2:  [91 %-96 %] 96 %  O2 Device: None (Room air)    Physical Exam  Vitals and nursing note reviewed.   Constitutional:       General: She is not in acute distress.     Appearance: She is well-developed. She is not ill-appearing.   HENT:      Head: Normocephalic and atraumatic.   Eyes:      General: Visual field deficit (Right upper quadronopsia) present. No scleral icterus.     Extraocular Movements: Extraocular movements intact.      Conjunctiva/sclera: Conjunctivae normal.      Pupils: Pupils are equal, round, and reactive to light.   Cardiovascular:      Rate and Rhythm:  Normal rate and regular rhythm.      Heart sounds: No murmur heard.  Pulmonary:      Effort: Pulmonary effort is normal. No respiratory distress.      Breath sounds: Normal breath sounds.   Abdominal:      Palpations: Abdomen is soft.      Tenderness: There is no abdominal tenderness.   Musculoskeletal:         General: No swelling.      Cervical back: Neck supple.      Right lower leg: No edema.      Left lower leg: No edema.   Skin:     General: Skin is warm and dry.      Capillary Refill: Capillary refill takes less than 2 seconds.   Neurological:      Mental Status: She is alert and oriented to person, place, and time.      GCS: GCS eye subscore is 4. GCS verbal subscore is 5. GCS motor subscore is 6.      Cranial Nerves: No cranial nerve deficit, dysarthria or facial asymmetry.      Sensory: Sensation is intact. No sensory deficit.      Motor: Weakness (LUE 4/5, LLE 4/5, RUE 5/5, RLE 5/5) and pronator drift (Left upper extremity) present. No tremor, atrophy, abnormal muscle tone or seizure activity.      Coordination: Coordination abnormal (abnormal left hand quick repetitive movement exam). Finger-Nose-Finger Test normal.      Gait: Gait is intact. Tandem walk (She is able to do it but slowly) normal.   Psychiatric:         Mood and Affect: Mood normal.         Behavior: Behavior normal.     Neurological Exam  Mental Status  Alert. Oriented to person, place, and time. no dysarthria present.    Cranial Nerves  CN III, IV, VI: Extraocular movements intact bilaterally. Pupils equal round and reactive to light bilaterally.    Sensory  Normal sensation.    Coordination  Tremors    Gait   Normal gait. Normal Tandem Gait Test (She is able to do it but slowly).        Lab Results: I have reviewed the following results:  Imaging Results Review: I personally reviewed the following image studies/reports in PACS and discussed pertinent findings with Radiology: MRI brain. My interpretation of the radiology images/reports  is: Slight increase in size of peripherally enhancing lesions of the left parietal occipital junction, in the parafalcine left parietal lobe with stable surrounding vasogenic edema.  No new intracranial enhancing lesions.  Stable foci of diffusion signal hyperintensity involving the right corona radiata and centrum semiovale white matter extending to the cortex of the right frontoparietal junction without associated enhancement.  Per radiology, this could represent sequela of subacute infarcts, asymmetric toxic local cephalopathy from chemotherapy agent.  There is also asymmetry susceptibility involving the cortex of the right pre and potential gyri without associated enhancement, encephalomalacia, or additional signal abnormalities, which is new compared to prior studies from 2024 and may represent sequela of prior subarachnoid hemorrhage in this region or prior insult.  Moderate chronic white matter microangiopathic changes..  Other Study Results Review: No additional pertinent studies reviewed.    VTE Pharmacologic Prophylaxis: VTE covered by:    None

## 2025-05-06 NOTE — PROGRESS NOTES
Progress Note - Hospitalist   Name: Ayla Nye 74 y.o. female I MRN: [id number]  Unit/Bed#: -01 I Date of Admission: 5/5/2025   Date of Service: 5/6/2025 I Hospital Day: 1    Assessment & Plan  Stroke-like symptoms  Presented w/ L-sided weakness ~ 1 week ago & was found to have infarct in the R MCA territory. Pt endorsing gradually worsening LUE/LLE.   Placed of CVA pathway on admit   CT brain:   Known left occipital lobe metastatic mass with small focus of subacute hemorrhage, and diffuse perilesional vasogenic edema in left cerebral hemisphere (worse posteriorly) was better evaluated on MRI brain with and without contrast dated 4/21/2025. No new acute intracranial abnormality.   CTA head/neck w/o LVO or high-grade stenosis   MRI brain w/ & w/o   Slight increase in size and peripheral enhancing lesion in the left parietal occipital junction and parafalcine left parietal lobe with stable surrounding vasogenic edema   Stable foci of diffusion signal hyperintensity of the right coronary radiata and centrum semiovale white matter extending to the cortex of the right frontoparietal junction without enhancement, that could represent the sequelae of subacute infarcts versus toxic local cephalopathy from chemotherapy   New asymmetric susceptibility involving the cortex of the right pre and post central gyri without enhancement, encephalomalacia, or signal abnormalities, which is new from prior studies in 2024 that could suggest sequela of prior subarachnoid hemorrhage or prior insult.   ; recent A1c benign   Neurology following, appreciate continued recs  Management of vasogenic edema as below   PT/OT/ST consulted   C/w aspirin & statin   Vasogenic brain edema (HCC)  2/2 to metastatic adenocarcinoma  Maintained on Decadron 2 mg BID PTA  Per neuro recs, Decadron 10 mg x 1 now, then 4 mg BID x 3 days, then 2 mg TID x 3 days, then 2 mg BID   Consult rad/onc   Stage IV adenocarcinoma of lung  metastatic to  "brain (HCC)  Management as above   Consult rad/onc   History of stroke  R frontal subacute infarct, noted on imaging on 04/21/25  C/w aspirin & statin   History of pulmonary embolism  Previously on Xarelto, which was d/c'd after a new foci of hemorrhage w/in left occipital metastasis  C/w aspirin q day  Anemia  Recent Labs     05/05/25  1044   HGB 10.6*     CBC pending   Trend   In setting of chronic disease   Leukocytosis  Recent Labs     05/05/25  1044   WBC 14.24*     2/2 to Decadron   Trend   Abnormal CT of the chest  Noted on imaging \"Partially imaged known right upper lobe lung mass with adjacent paramediastinal fibrosis, small left upper lobe metastatic pulmonary nodule.\"  Recommend follow-up dedicated CT chest with contrast per oncologic recommendations   HTN (hypertension)  BP stable  Resume PTA amlodipine on 05/07    VTE Pharmacologic Prophylaxis: VTE Score: 9 High Risk (Score >/= 5) - Pharmacological DVT Prophylaxis Contraindicated. Sequential Compression Devices Ordered.    Mobility:   Basic Mobility Inpatient Raw Score: 22  JH-HLM Goal: 7: Walk 25 feet or more  JH-HLM Achieved: 6: Walk 10 steps or more  JH-HLM Goal NOT achieved. Continue with multidisciplinary rounding and encourage appropriate mobility to improve upon JH-HLM goals.    Patient Centered Rounds: I performed bedside rounds with nursing staff today.   Discussions with Specialists or Other Care Team Provider: Plan of care reviewed with neurology, radiation oncology, case management, nursing    Education and Discussions with Family / Patient: Patient declined call to .     Current Length of Stay: 1 day(s)  Current Patient Status: Inpatient   Certification Statement: The patient will continue to require additional inpatient hospital stay due to vasogenic edema, pending read on consultation  Discharge Plan: Anticipate discharge in 24-48 hrs to home.    Code Status: Level 1 - Full Code    Subjective   Patient is doing okay.  She is " still endorsing left upper extremity and left lower extremity weakness.  She notes her 's to resume Xarelto and aspirin with risk of bleeding.  She is aware of her hypercoagulable state in setting of CA.  Patient cannot have her home health aide come today as she canceled the session due to hospitalization.  She would like to wait to be discharged until 05/07 to have home health care arranged appropriately.    Objective :  Temp:  [97.9 °F (36.6 °C)-98 °F (36.7 °C)] 97.9 °F (36.6 °C)  HR:  [67-95] 69  BP: (123-154)/(66-85) 134/78  Resp:  [18-20] 18  SpO2:  [91 %-96 %] 96 %  O2 Device: None (Room air)    Body mass index is 21.58 kg/m².     Input and Output Summary (last 24 hours):   No intake or output data in the 24 hours ending 05/06/25 0913    Physical Exam  Constitutional:       Comments: Thin, frail, chronically ill in appearance, very pleasant   HENT:      Head: Normocephalic.      Right Ear: External ear normal.      Left Ear: External ear normal.      Nose: Nose normal.      Mouth/Throat:      Mouth: Mucous membranes are moist.      Pharynx: Oropharynx is clear.   Eyes:      Extraocular Movements: Extraocular movements intact.      Conjunctiva/sclera: Conjunctivae normal.   Cardiovascular:      Rate and Rhythm: Normal rate and regular rhythm.      Pulses: Normal pulses.      Heart sounds: Normal heart sounds.   Pulmonary:      Effort: Pulmonary effort is normal. No respiratory distress.      Breath sounds: Normal breath sounds. No wheezing or rales.   Abdominal:      General: Abdomen is flat. Bowel sounds are normal. There is no distension.      Palpations: Abdomen is soft.      Tenderness: There is no abdominal tenderness. There is no guarding or rebound.   Musculoskeletal:         General: Normal range of motion.      Cervical back: Normal range of motion.      Right lower leg: No edema.      Left lower leg: No edema.   Skin:     General: Skin is warm and dry.   Neurological:      Mental Status: She  is alert and oriented to person, place, and time. Mental status is at baseline.      Comments: Neuroexam deferred by patient as recently conducted by neurology resident; please refer to note from neurology on 05/06   Psychiatric:         Mood and Affect: Mood normal.         Behavior: Behavior normal.             Telemetry:  Telemetry Orders (From admission, onward)               24 Hour Telemetry Monitoring  Continuous x 24 Hours (Telem)        Expiring   Question:  Reason for 24 Hour Telemetry  Answer:  TIA/Suspected CVA/ Confirmed CVA                     Telemetry Reviewed: Normal Sinus Rhythm  Indication for Continued Telemetry Use: No indication for continued use. Will discontinue.                Lab Results: I have reviewed the following results:   Results from last 7 days   Lab Units 05/05/25  1044 05/01/25  1041   WBC Thousand/uL 14.24* 13.36*   HEMOGLOBIN g/dL 10.6* 9.5*   HEMATOCRIT % 34.5* 32.3*   PLATELETS Thousands/uL 428* 404*   BANDS PCT %  --  3   LYMPHO PCT % 2* 0*   MONO PCT % 4 2*   EOS PCT % 0 0     Results from last 7 days   Lab Units 05/05/25  1044 05/01/25  1041   SODIUM mmol/L 138 144   POTASSIUM mmol/L 3.9 4.4   CHLORIDE mmol/L 104 107   CO2 mmol/L 25 26   BUN mg/dL 29* 25   CREATININE mg/dL 1.00 0.96   ANION GAP mmol/L 9 11   CALCIUM mg/dL 9.3 9.5   ALBUMIN g/dL  --  4.0   TOTAL BILIRUBIN mg/dL  --  0.27   ALK PHOS U/L  --  59   ALT U/L  --  10   AST U/L  --  10*   GLUCOSE RANDOM mg/dL 111 82     Results from last 7 days   Lab Units 05/05/25  1044   INR  0.94                   Recent Cultures (last 7 days):         Imaging Results Review: I reviewed radiology reports from this admission including: CT head and MRI brain.  Other Study Results Review: EKG was reviewed.     Last 24 Hours Medication List:     Current Facility-Administered Medications:     acetaminophen (TYLENOL) tablet 975 mg, Q6H PRN    aluminum-magnesium hydroxide-simethicone (MAALOX) oral suspension 30 mL, Q4H PRN    [Held by  provider] amLODIPine (NORVASC) tablet 5 mg, Daily    aspirin chewable tablet 81 mg, Daily    atorvastatin (LIPITOR) tablet 40 mg, Daily With Dinner    cyanocobalamin (VITAMIN B-12) tablet 1,000 mcg, Daily    dexamethasone (DECADRON) tablet 2 mg, BID With Meals    gabapentin (NEURONTIN) capsule 200 mg, Daily    gabapentin (NEURONTIN) capsule 300 mg, HS    HYDROmorphone (DILAUDID) tablet 2 mg, Q4H PRN    levETIRAcetam (KEPPRA) tablet 1,000 mg, BID    levothyroxine tablet 50 mcg, Early Morning    melatonin tablet 3 mg, HS    ondansetron (ZOFRAN) oral solution 4 mg, Q8H KRISS    pantoprazole (PROTONIX) EC tablet 40 mg, Early Morning    senna (SENOKOT) tablet 8.6 mg, QPM    sulfamethoxazole-trimethoprim (BACTRIM DS) 800-160 mg per tablet 1 tablet, Once per day on Monday Wednesday Friday    Administrative Statements   Today, Patient Was Seen By: Cheri Mills PA-C  I have spent a total time of 55 minutes in caring for this patient on the day of the visit/encounter including Diagnostic results, Prognosis, Risks and benefits of tx options, Instructions for management, Patient and family education, Importance of tx compliance, Impressions, Counseling / Coordination of care, Documenting in the medical record, Reviewing/placing orders in the medical record (including tests, medications, and/or procedures), Obtaining or reviewing history  , and Communicating with other healthcare professionals .    **Please Note: This note may have been constructed using a voice recognition system.**

## 2025-05-06 NOTE — ASSESSMENT & PLAN NOTE
2/2 to metastatic adenocarcinoma  Maintained on Decadron 2 mg BID PTA  Per neuro recs, Decadron 10 mg x 1 now, then 4 mg BID x 3 days, then 2 mg TID x 3 days, then 2 mg BID   Consult rad/onc

## 2025-05-06 NOTE — ASSESSMENT & PLAN NOTE
"Noted on imaging \"Partially imaged known right upper lobe lung mass with adjacent paramediastinal fibrosis, small left upper lobe metastatic pulmonary nodule.\"  Recommend follow-up dedicated CT chest with contrast per oncologic recommendations   "

## 2025-05-06 NOTE — SPEECH THERAPY NOTE
"Speech Language/Pathology  Speech/Language Pathology  Assessment    Patient Name: Ayla Nye  Today's Date: 5/6/2025     Consult received for speech/swallow eval on stroke pathway. Pt passed nsg swallow screen; tolerating regular diet w/o s/s dysphagia or aspiration. Spoke with pt at bedside. Pt denying dysphagia at this time. Pt stating she has \"mild word finding\" at baseline and does not have any c/o of speech/language deficits.     MRI:   IMPRESSION:     1.  Slight increase in size of the peripherally enhancing lesions involving the left parietal occipital junction, and the parafalcine left parietal lobe, with stable surrounding vasogenic edema. These may represent evolving posttreatment changes, but   continued attention on subsequent studies is recommended.  2.  No new intracranial enhancing lesions noted.  3.  Stable foci of diffusion signal hyperintensity involving the right corona radiata and centrum semiovale white matter extending to the cortex of the right frontoparietal junction, without associated enhancement. While this may represent the sequela of   subacute infarcts, asymmetric toxic leukoencephalopathy from chemotherapy agent is also a consideration.  4.  Asymmetric susceptibility involving the cortex of the right pre and postcentral gyri, without associated enhancement, encephalomalacia or additional signal abnormalities. This is new when compared to prior studies from 2024, and may represent a   sequela of prior subarachnoid hemorrhage in this region, or sequela of prior insult.  5.  Moderate chronic white matter microangiopathic changes    No need for formal speech/swallow eval at this time. Reconsult if needed.          "

## 2025-05-06 NOTE — PROGRESS NOTES
Patient:  SURYA PUENTE    MRN:  [id number]    [place] Request ID:  [id number]    Level of care reserved:  [facility]    [facility] [place] [address] [phone]    Clinical needs requested:    Geography searched:  [phone]    Start of Service:    Request sent:  12:42pm EDT on 5/6/2025 by Ava Prince    Partner reserved:  1:42pm EDT on 5/6/2025 by Ava Prince    Choice list shared:  1:42pm EDT on 5/6/2025 by Ava Prince

## 2025-05-06 NOTE — CASE MANAGEMENT
Case Management Assessment & Discharge Planning Note    Patient name Ayla Nye  Location /-01 MRN [id number]  : 1950 Date 2025       Current Admission Date: 2025  Current Admission Diagnosis:Stroke-like symptoms   Patient Active Problem List    Diagnosis Date Noted Date Diagnosed    Stroke-like symptoms 2025     History of stroke 2025     NSVT (nonsustained ventricular tachycardia) (HCC) 2025     Left-sided weakness 2025     Right loin pain 2025     Complication of chemotherapy/immunotherapy 02/15/2025     GERD (gastroesophageal reflux disease) 2025     Epistaxis 2025     History of Pneumocystis jirovecii pneumonia 2025     IgG deficiency (HCC) 2025     Right kidney mass 2025     History of pulmonary embolism 2024     Dyspnea on exertion 2024     Imbalance 2024     Hyponatremia 2024     Leukocytosis 10/27/2024     Anemia 10/25/2024     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     Palliative care patient 2024     Cancer associated pain 2024     Goals of care, counseling/discussion 2024     Right hip pain 09/10/2024     Altered mental status 2024     Hypothyroidism 2024     Abnormal imaging of central nervous system 2024     Acute metabolic encephalopathy 2024     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) 2024     Brain mass 2024     Metastatic cancer to brain (HCC) 2024     Dehydration 2024     Moderate protein-calorie malnutrition (HCC) 2024     Bilateral leg pain 2024     Insomnia 2024     Metastatic adenocarcinoma (HCC) 2024     Age-related osteoporosis without current pathological fracture 2023     Encounter for monitoring of hydroxyurea therapy 2023     JAK2 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 2023     Hypertension 08/10/2022     Mixed hyperlipidemia 08/10/2022      Essential thrombocytosis (HCC) 07/02/2020     TB lung, 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     Psoriatic arthritis (HCC) 09/10/2019       LOS (days): 1  Geometric Mean LOS (GMLOS) (days): 4.5  Days to GMLOS:3.5     OBJECTIVE:  PATIENT READMITTED TO HOSPITAL  Risk of Unplanned Readmission Score: 58.71         Current admission status: Inpatient       Preferred Pharmacy:   [id number] - [facility] [place] - [address] [facility]  [address] [facility] [facility] [place] [address]  Phone: [phone] Fax: [phone]    [facility] - [place] - [address]  [address]  [place] [address]  Phone: [phone] Fax: [phone]    Primary Care Provider: Rafy Angelo MD    Primary Insurance: MEDICARE  Secondary Insurance: AARP    ASSESSMENT:  Active Health Care Proxies       Juliana Camarena Alternate Health Care Agent - Daughter   Primary Phone: [phone] (Mobile)  Home Phone: [phone]                           Readmission Root Cause  30 Day Readmission: Yes  During your hospital stay, did someone (provider, nurse, ) explain your care to you in a way you could understand?: Yes  Did you feel medically stable to leave the hospital?: Yes  Were you able to pay for your medication at the pharmacy?: Unable to Assess (Per patient she was not given any medications)  Did you have reliable transportation to take you to your appointments?: Yes  During previous admission, was a post-acute recommendation made?: No  Patient was readmitted due to: Stroke-like symptoms  Action Plan: Further CM follow up as needed for dcp    Patient Information  Admitted from:: Home  Mental Status: Alert  During Assessment patient was accompanied by: Not accompanied during assessment  Assessment information provided by:: Patient  Primary Caregiver: Self  Support Systems: Home care staff, Children  County of Residence: [place]  What city do you live in?: [place]  Home entry  access options. Select all that apply.: Stairs  Number of steps to enter home.: 6  Do the steps have railings?: Yes  Type of Current Residence: 2 story home  Upon entering residence, is there a bedroom on the main floor (no further steps)?: No  A bedroom is located on the following floor levels of residence (select all that apply):: 2nd Floor  Upon entering residence, is there a bathroom on the main floor (no further steps)?: No (Pt stated she has a claudio-potty on the first floor)  Indicate which floors of current residence have a bathroom (select all the apply):: 2nd Floor  Number of steps to 2nd floor from main floor:  (13)  Living Arrangements: Lives Alone  Is patient a ?: No    Activities of Daily Living Prior to Admission  Functional Status: Independent  Completes ADLs independently?: Yes  Ambulates independently?: Yes  Does patient use assisted devices?: No  Does patient currently own DME?: Yes  What DME does the patient currently own?: Walker, Straight Cane  Does patient have a history of Outpatient Therapy (PT/OT)?: No  Does the patient have a history of Short-Term Rehab?: No  Does patient have a history of HHC?: Yes (pt has a private Home health aide 5 hours a day, daily)  Does patient currently have HHC?: Yes (per patient, she is set up with [facility] who will start after she is discharged)    Current Home Health Care  Type of Current Home Care Services: Nurse visit, Home PT, Home OT  Home Health Agency Name:: [facility]  Current Home Health Follow-Up Provider:: PCP    Patient Information Continued  Income Source: Pension/MCC  Does patient have prescription coverage?: Yes  Can the patient afford their medications and any related supplies (such as glucometers or test strips)?: Yes  Does patient receive dialysis treatments?: No  Does patient have a history of substance abuse?: No  Does patient have a history of Mental Health Diagnosis?: No         Means of Transportation  Means of Transport to [facility]::  Family transport          DISCHARGE DETAILS:    Discharge planning discussed with:: patient at bedside  Freedom of Choice: Yes  Comments - Freedom of Choice: CM spoke with patient at bedside re: assessment and dcp. Patient provided assessment information. CM and patient reviewed PT's recommendation of Home PT. Per patient, she has [facility] set up already and they are supposed to come out this week. per patient she has to call the [facility] when she is home. Patient is agreeable to referral being sent to [facility]. Per Patient, she has a home health aide also who comes daily for 5 hours. Per patient, her Home Health Aide will be at her house when she is discharged.  CM contacted family/caregiver?: Yes  Were Treatment Team discharge recommendations reviewed with patient/caregiver?: Yes  Did patient/caregiver verbalize understanding of patient care needs?: Yes  Were patient/caregiver advised of the risks associated with not following Treatment Team discharge recommendations?: Yes    Contacts  Patient Contacts: Juliana Camarena (Daughter)  Relationship to Patient:: Family  Contact Method: Phone  Phone Number: [phone]  Reason/Outcome: Continuity of Care, Emergency Contact, Referral, Discharge Planning    Requested Home Health Care         Is the patient interested in HHC at discharge?: Yes  Home Health Discipline requested:: Physical Therapy  Home Health Agency Name:: [facility] External Referral Reason (only applicable if external HHA name selected): Patient has established relationship with provider  Home Health Follow-Up Provider:: PCP  Home Health Services Needed:: Strengthening/Theraputic Exercises to Improve Function, Evaluate Functional Status and Safety, Gait/ADL Training  Homebound Criteria Met:: Requires the Assistance of Another Person for Safe Ambulation or to Leave the Home  Supporting Clincal Findings:: Limited Endurance, Fatigues Easliy in Short Distances    DME Referral Provided  Referral made for DME?:  No    Other Referral/Resources/Interventions Provided:  Interventions: [facility]  Referral Comments: referral sent via aidin         Treatment Team Recommendation: Home with Home Health Care  Discharge Destination Plan:: Home with Home Health Care  Transport at Discharge : Family

## 2025-05-06 NOTE — PLAN OF CARE
Problem: PHYSICAL THERAPY ADULT  Goal: Performs mobility at highest level of function for planned discharge setting.  See evaluation for individualized goals.  Description: Treatment/Interventions: ADL retraining, Functional transfer training, LE strengthening/ROM, Elevations, Therapeutic exercise, Endurance training, Patient/family training, Equipment eval/education, Bed mobility, Gait training, Compensatory technique education, Spoke to nursing, Spoke to case management, OT  Equipment Recommended:  (Patient has a cane and a standard walker)       See flowsheet documentation for full assessment, interventions and recommendations.  Note:    Problem List: Decreased strength, Decreased endurance, Impaired balance, Decreased mobility, Decreased coordination, Decreased safety awareness  Assessment: Patient seen for Physical Therapy evaluation. Patient admitted with Stroke-like symptoms.  Comorbidities affecting patient's physical performance include: Stage IV lung cancer with mets to brain, anemia, stroke history, psoriatic arthritis, HTN, HLD, osteoporosis, AMS, PE, CAIN, NSVT, imbalance.  Personal factors affecting patient at time of initial evaluation include: lives in two story house, stairs to enter home, inability to navigate community distances, inability to navigate level surfaces without external assistance, inability to perform dynamic tasks in community, and limited home support. Prior to admission, patient was independent with functional mobility without assistive device, independent with ADLS, requiring assist for IADLS, living alone in two story home with 6 steps to enter, and ambulating household distance.  Please find objective findings from Physical Therapy assessment regarding body systems outlined above with impairments and limitations including weakness, impaired balance, decreased endurance, impaired coordination, gait deviations, decreased activity tolerance, decreased functional mobility  tolerance, decreased safety awareness, fall risk, and SOB upon exertion.  The Barthel Index was used as a functional outcome tool presenting with a score of Barthel Index Score: 55 today indicating marked limitations of functional mobility and ADLS.  Patient's clinical presentation is currently unstable/unpredictable as seen in patient's presentation of increased fall risk, new onset of impairment of functional mobility, decreased endurance, and new onset of weakness. Pt would benefit from continued Physical Therapy treatment to address deficits as defined above and maximize level of functional mobility. As demonstrated by objective findings, the assigned level of complexity for this evaluation is high.The patient's AM-[facility] Basic Mobility Inpatient Short Form Raw Score is 19. A Raw score of greater than 16 suggests the patient may benefit from discharge to home. Please also refer to the recommendation of the Physical Therapist for safe discharge planning.        Rehab Resource Intensity Level, PT: III (Minimum Resource Intensity)    See flowsheet documentation for full assessment.

## 2025-05-06 NOTE — QUICK NOTE
Case reviewed w/ rad oncology. C/w Decadron on taper. A short interval MRI should be scheduled; outpt f/u w/ Dr. Honey Gamboa.

## 2025-05-06 NOTE — PLAN OF CARE
Problem: OCCUPATIONAL THERAPY ADULT  Goal: Performs self-care activities at highest level of function for planned discharge setting.  See evaluation for individualized goals.  Description: Treatment Interventions: ADL retraining, Functional transfer training, Endurance training, UE strengthening/ROM, Patient/family training, Equipment evaluation/education, Activityengagement, Neuromuscular reeducation          See flowsheet documentation for full assessment, interventions and recommendations.   Note: Limitation: Decreased ADL status, Decreased UE strength, Decreased Safe judgement during ADL, Decreased endurance, Decreased self-care trans, Decreased high-level ADLs, Visual deficit (balance, coordination)  Prognosis: Good  Assessment: Patient is a 74 y.o. female seen for OT evaluation at [facility] following admission on 5/5/2025  s/p Stroke-like symptoms. Please see above for comprehensive list of comorbidities and significant PMHx impacting functional performance.  Upon initial evaluation, pt appears to be performing below baseline functional status.   Occupational performance is affected by the following deficits: endurance ,  decreased muscular strength , impaired coordination , decreased balance , decreased standing tolerance for self care tasks , and impaired visual field . Personal/Environmental factors impacting D/C include: , steps to negotiate home, metastatic CA with known mets to brain. Supporting factors include: support system available Patient would benefit from OT services within the acute care setting to maximize level of functional independence in the following areas self-care transfers, functional mobility, and ADLs.  From OT standpoint, recommendation at time of D/C would be Level 3: minimum resource intensity .     Rehab Resource Intensity Level, OT: III (Minimum Resource Intensity)

## 2025-05-06 NOTE — OCCUPATIONAL THERAPY NOTE
Occupational Therapy Evaluation     Patient Name: Ayla Nye  Today's Date: 5/6/2025  Problem List  Principal Problem:    Stroke-like symptoms  Active Problems:    HTN (hypertension)    Metastatic adenocarcinoma (HCC)    Metastatic cancer to brain (HCC)    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)    Anemia    Leukocytosis    History of pulmonary embolism    History of stroke    Vasogenic brain edema (HCC)    Abnormal CT of the chest    Past Medical History  Past Medical History:   Diagnosis Date    DILLON (acute kidney injury) (HCC) 02/14/2025    Allergic 2022    Allergic to neomiacin and cephalexin    Cancer (HCC)     lung cancer    Cancer (HCC)     mets to brain    Cancer (HCC)     perianal mass    Cataract Nov. 2023    Due to have durgery June 2024    Essential thrombocytosis (HCC)     controlled with medication    History of transfusion     Hyperlipidemia 2015    Hypertension 2011    Mass in chest     Nodular goiter     Osteoporosis     Peripheral neuropathy     Pneumonia Oct 16, 2023    Also  Feb 2024    Psoriasis     SIRS (systemic inflammatory response syndrome) (HCC) 06/22/2024     Past Surgical History  Past Surgical History:   Procedure Laterality Date    APPENDECTOMY  1954    BREAST CYST EXCISION Right     COLONOSCOPY  10/31/2018    DXA PROCEDURE (HISTORICAL)  04/21/2017    IR BIOPSY OTHER  09/12/2024    IR LUMBAR PUNCTURE  09/12/2024    MAMMO (HISTORICAL)      8-24-18    MAMMO NEEDLE LOCALIZATION RIGHT (ALL INC) Right 07/13/2009    SKIN LESION EXCISION      bridge of nose             05/06/25 0955   OT Last Visit   OT Visit Date 05/06/25   Note Type   Note type Evaluation   Pain Assessment   Pain Assessment Tool 0-10   Pain Score No Pain   Restrictions/Precautions   Weight Bearing Precautions Per Order No   Other Precautions Bed Alarm;Chair Alarm;Telemetry;Fall Risk   Home Living   Type of Home House   Home Layout Two level;Stairs to enter with rails;Bed/bath upstairs  (6 HARRY,  "FOS to upstairs bed /bath)   Bathroom Shower/Tub Tub/shower unit   Bathroom Toilet Standard   Bathroom Equipment Grab bars in shower  (commode ,)   Bathroom Accessibility Accessible   Home Equipment Walker;Cane  (standard walker)   Prior Function   Level of [place] Independent with ADLs;Independent with functional mobility;Independent with IADLS  (Pt reports supervision on stairs when HHA is  there for safety but completes (I) when alone)   Lives With Alone   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;Home health  (supportive ex  , 2 local daughters, 7x/week x 5 hours (2-7p), HHPT.)   IADLs Family/Friend/Other provides transportation;Independent with meal prep;Family/Friend/Other provides medication management  (HHA A with medications. Pt reports she manages most of her household tasks- HHA just \"supervises\")   Falls in the last 6 months 0   Vocational Retired   Comments Pt reports just started with HHPT. Denies using AD for mobility, endorses light touch support on walls/surfaces   Lifestyle   Autonomy PTA pt is (I) c ADLs, (I) c most IADLs. no AD. Lives alone, has strong support system and HHAs. (-) driving   Reciprocal Relationships supportive ex , 2 daughters, HHAs   Service to Others retired   Intrinsic Gratification being independent, reading   General   Additional Pertinent History pt admitted d/t stroke like symptoms. hx of metastatic CA to brain 2/2 stage IV adenocarcinoma of lung, vasogenic brain edemia, and CVA (R MCA subacute infarcts). MRI shows: 1.  Slight increase in size of the peripherally enhancing lesions involving the left parietal occipital junction, and the parafalcine left parietal lobe, with stable surrounding vasogenic edema,  No new intracranial enhancing lesions noted.   Family/Caregiver Present No   Subjective   Subjective pleasant and engaged throughout session.   ADL   Where Assessed Edge of bed   Eating Assistance 6  Modified independent   Grooming Assistance 6  Modified Independent "   UB Bathing Assistance 5  Supervision/Setup   LB Bathing Assistance 5  Supervision/Setup   UB Dressing Assistance 5  Supervision/Setup   LB Dressing Assistance 5  Supervision/Setup   Toileting Assistance  5  Supervision/Setup   Functional Assistance 5  Supervision/Setup   Bed Mobility   Supine to Sit 6  Modified independent   Additional items Increased time required   Sit to Supine 6  Modified independent   Additional items Increased time required   Transfers   Sit to Stand 5  Supervision   Additional items Assist x 1;Increased time required;Verbal cues   Stand to Sit 5  Supervision   Additional items Assist x 1;Increased time required;Verbal cues   Additional Comments safe body mechanics demonstrated during transfers, no AD   Functional Mobility   Functional Mobility 5  Supervision   Additional Comments community distance mobility, fatigue noted with further distances.   Balance   Static Sitting Fair   Static Standing Fair   Activity Tolerance   Activity Tolerance Patient limited by fatigue   Medical Staff Made Aware CM, OT.   Nurse Made Aware RN pre/post   RUE Assessment   RUE Assessment WFL  (MMT 4/5 throughout)   LUE Assessment   LUE Assessment X  (3+/5 SF, 3/5 EE/EF MMT.)   Hand Function   Gross Motor Coordination Impaired   Fine Motor Coordination Impaired   Hand Function Comments WFL RUE, impaired L UE. notable dysmetria  (R hand dominant)   Sensation   Light Touch No apparent deficits   Vision-Basic Assessment   Current Vision Wears glasses all the time   Patient Visual Report   (hx of R visual peripheral deficits from brain mets. reports new diplopia x 1 week intermittently. mainly presents when reading subtitles on TV or reading up close.)   Vision - Complex Assessment   Diplopia Assessment Present in near gaze;Present in far gaze;Objects split on top of one another   Perception   Inattention/Neglect Cues to attend right visual field  (baseline per pt)   Cognition   Overall Cognitive Status WFL    Arousal/Participation Alert;Cooperative   Attention Attends with cues to redirect   Orientation Level Oriented X4   Memory Within functional limits   Following Commands Follows multistep commands without difficulty   Comments Pt agreeable to OT session. Appropriate safety awareness, attention, memory, problem solving and insight. Anticipate some higher level deficits 2/2 known brain mets but no impact on functional status/safety   Assessment   Limitation Decreased ADL status;Decreased UE strength;Decreased Safe judgement during ADL;Decreased endurance;Decreased self-care trans;Decreased high-level ADLs;Visual deficit  (balance, coordination)   Prognosis Good   Assessment Patient is a 74 y.o. female seen for OT evaluation at [facility] following admission on 5/5/2025  s/p Stroke-like symptoms. Please see above for comprehensive list of comorbidities and significant PMHx impacting functional performance.  Upon initial evaluation, pt appears to be performing below baseline functional status.   Occupational performance is affected by the following deficits: endurance ,  decreased muscular strength , impaired coordination , decreased balance , decreased standing tolerance for self care tasks , and impaired visual field . Personal/Environmental factors impacting D/C include: , steps to negotiate home, metastatic CA with known mets to brain. Supporting factors include: support system available Patient would benefit from OT services within the acute care setting to maximize level of functional independence in the following areas self-care transfers, functional mobility, and ADLs.  From OT standpoint, recommendation at time of D/C would be Level 3: minimum resource intensity .   Goals   Patient Goals to return home, to get stronger.   Plan   Treatment Interventions ADL retraining;Functional transfer training;Endurance training;UE strengthening/ROM;Patient/family training;Equipment  evaluation/education;Activityengagement;Neuromuscular reeducation   Goal Expiration Date 05/16/25   OT Treatment Day 0   OT Frequency 1-2x/wk   Discharge Recommendation   Rehab Resource Intensity Level, OT III (Minimum Resource Intensity)   Additional Comments  The patient's raw score on the AM-PAC Daily Activity Inpatient Short Form is 20. A raw score of greater than or equal to 19 suggests the patient may benefit from discharge to home. Please refer to the recommendation of the Occupational Therapist for safe discharge planning.   AM-PAC Daily Activity Inpatient   Lower Body Dressing 3   Bathing 3   Toileting 3   Upper Body Dressing 3   Grooming 4   Eating 4   Daily Activity Raw Score 20   Daily Activity Standardized Score (Calc for Raw Score >=11) 42.03   AM-PAC Applied Cognition Inpatient   Following a Speech/Presentation 3   Understanding Ordinary Conversation 4   Taking Medications 3   Remembering Where Things Are Placed or Put Away 4   Remembering List of 4-5 Errands 4   Taking Care of Complicated Tasks 3   Applied Cognition Raw Score 21   Applied Cognition Standardized Score 44.3   End of Consult   Education Provided Yes   Patient Position at End of Consult All needs within reach;Bed/Chair alarm activated;Supine   Nurse Communication Nurse aware of consult   Goals established on initial evaluation in order to achieve pt's goal of returning home      Pt will complete UB ADLs Mod independent   for increased ADL independence within 10 days.     Pt will complete LB ADLs Mod independent   for increased ADL independence within 10 days.     Pt will complete toileting Mod independent   with use of DME for increased ADL independence within 10 days.     Pt will demonstrate proper body mechanics to complete self-care transfers and functional mobility with Mod independent  and use of LRAD for increased safety and functional independence within 10 days.     Pt will demonstrate standing tolerance of 6 min for increased  activity tolerance during ADL/IADL tasks within 10 days.     Pt will demonstrate proper body mechanics and fall prevention strategies during 100% of tx sessions for increased safety awareness during ADL/IADLs    Pt will demonstrate activity tolerance of 30 min in therapeutic tasks for increased participation in meaningful activities upon D/C.    Pt will demonstrate OOB sitting tolerance of 2-4 hr/day for increased activity tolerance and engagement in leisure activities within 10 days.       Pt benefited from co-session of skilled OT and PT therapists in order to most appropriately address functional deficits d/t evolving medical status .  OT/PT objectives were addressed separately; please see PT note for specific goal areas targeted.  Vicky Norwood, OT

## 2025-05-06 NOTE — PHYSICAL THERAPY NOTE
PT EVALUATION    NAME:  Ayla Nye  AGE:   74 y.o.  Mrn:   [id number]  Length Of Stay: 1    ADMIT DX:  History of stroke [Z86.73]  Acute left-sided weakness [R53.1]  Lower extremity weakness [R29.898]  Stroke-like symptoms [R29.90]    Past Medical History:   Diagnosis Date    DILLON (acute kidney injury) (HCC) 02/14/2025    Allergic 2022    Allergic to neomiacin and cephalexin    Cancer (HCC)     lung cancer    Cancer (HCC)     mets to brain    Cancer (HCC)     perianal mass    Cataract Nov. 2023    Due to have durgery June 2024    Essential thrombocytosis (HCC)     controlled with medication    History of transfusion     Hyperlipidemia 2015    Hypertension 2011    Mass in chest     Nodular goiter     Osteoporosis     Peripheral neuropathy     Pneumonia Oct 16, 2023    Also  Feb 2024    Psoriasis     SIRS (systemic inflammatory response syndrome) (HCC) 06/22/2024     Past Surgical History:   Procedure Laterality Date    APPENDECTOMY  1954    BREAST CYST EXCISION Right     COLONOSCOPY  10/31/2018    DXA PROCEDURE (HISTORICAL)  04/21/2017    IR BIOPSY OTHER  09/12/2024    IR LUMBAR PUNCTURE  09/12/2024    MAMMO (HISTORICAL)      8-24-18    MAMMO NEEDLE LOCALIZATION RIGHT (ALL INC) Right 07/13/2009    SKIN LESION EXCISION      bridge of nose        05/06/25 1010   PT Last Visit   PT Visit Date 05/06/25   Note Type   Note type Evaluation   Pain Assessment   Pain Assessment Tool 0-10   Pain Score No Pain   Restrictions/Precautions   Other Precautions Fall Risk;Bed Alarm;Chair Alarm;Telemetry   Home Living   Type of Home House   Home Layout Two level;Bed/bath upstairs;Stairs to enter with rails  (6 steps to enter)   Bathroom Shower/Tub Tub/shower unit   Bathroom Toilet Standard   Bathroom Equipment Grab bars in shower;Shower chair;Commode  (Commode on first floor)   Bathroom Accessibility Accessible   Home Equipment Cane;Walker  (Standard walker)   Additional Comments Patient states she is waiting to start home PT.    Prior Function   Level of [place] Independent with ADLs;Independent with functional mobility;Needs assistance with IADLS   Lives With (S)  Alone   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;Home health  (Patient's ex  drives her to appointments; home health aide 7x/wk x 5 hrs (2-7); local daughters)   IADLs Family/Friend/Other provides transportation;Family/Friend/Other provides medication management;Independent with meal prep   Falls in the last 6 months 0  (Denies)   Vocational Retired   Comments Patient normally ambulates without an assistive device prior to admission.  Confirms light holding of walls and furniture during ambulation and mobility   General   Additional Pertinent History Patient is admitted with worsening left-sided weakness and difficulty ambulating.  Patient has a history of stage IV lung cancer with mets to brain. Patient presenting with left-sided weakness approximately a week ago found to have infarct in the right MCA territory.  This is now gradually been getting worse.   Family/Caregiver Present No   Cognition   Overall Cognitive Status WFL   Arousal/Participation Cooperative   Orientation Level Oriented X4   Memory Within functional limits   Following Commands Follows multistep commands without difficulty   Comments At least 2 patient identifiers including name and date of birth   Subjective   Subjective Patient agreeable to work with PT. reports left-sided weakness   RLE Assessment   RLE Assessment WFL  (Grossly 3+ to 4 -/5)   LLE Assessment   LLE Assessment WFL  (Grossly 3-3+/5)   Vision-Basic Assessment   Current Vision Wears glasses all the time   Coordination   Movements are Fluid and Coordinated 0   Coordination and Movement Description Mild incoordination LLE compared to RLE   Sensation WFL   Heel to Shin Impaired   Rapid Alternating Movements Intact   Light Touch   RLE Light Touch Grossly intact   LLE Light Touch Grossly intact   Bed Mobility   Supine to Sit 6  Modified independent    Additional items Increased time required;HOB elevated   Sit to Supine 6  Modified independent   Additional items Increased time required   Transfers   Sit to Stand 5  Supervision   Additional items Assist x 1;Verbal cues;Increased time required   Stand to Sit 5  Supervision   Additional items Assist x 1;Verbal cues;Increased time required   Ambulation/Elevation   Gait pattern Short stride;Narrow BILL;Step through pattern;Decreased foot clearance;Decreased heel strike  (Occasional veer to left side; little to no BUE swing with gait)   Gait Assistance 5  Supervision   Additional items Assist x 1;Verbal cues;Tactile cues   Assistive Device None   Distance 80 feet with change in direction   Balance   Static Sitting Fair   Static Standing Fair   Ambulatory   (F-/fair; slight increase in veering to left side with gait fatigue)   Endurance Deficit   Endurance Deficit Yes   Endurance Deficit Description Limited overall activity, functional mobility and gait endurance   Activity Tolerance   Activity Tolerance Patient limited by fatigue   Medical Staff Made Aware CM;OT   Nurse Made Aware RN pre and post   Assessment   Problem List Decreased strength;Decreased endurance;Impaired balance;Decreased mobility;Decreased coordination;Decreased safety awareness   Assessment Patient seen for Physical Therapy evaluation. Patient admitted with Stroke-like symptoms.  Comorbidities affecting patient's physical performance include: Stage IV lung cancer with mets to brain, anemia, stroke history, psoriatic arthritis, HTN, HLD, osteoporosis, AMS, PE, CAIN, NSVT, imbalance.  Personal factors affecting patient at time of initial evaluation include: lives in two story house, stairs to enter home, inability to navigate community distances, inability to navigate level surfaces without external assistance, inability to perform dynamic tasks in community, and limited home support. Prior to admission, patient was independent with functional mobility  without assistive device, independent with ADLS, requiring assist for IADLS, living alone in two story home with 6 steps to enter, and ambulating household distance.  Please find objective findings from Physical Therapy assessment regarding body systems outlined above with impairments and limitations including weakness, impaired balance, decreased endurance, impaired coordination, gait deviations, decreased activity tolerance, decreased functional mobility tolerance, decreased safety awareness, fall risk, and SOB upon exertion.  The Barthel Index was used as a functional outcome tool presenting with a score of Barthel Index Score: 55 today indicating marked limitations of functional mobility and ADLS.  Patient's clinical presentation is currently unstable/unpredictable as seen in patient's presentation of increased fall risk, new onset of impairment of functional mobility, decreased endurance, and new onset of weakness. Pt would benefit from continued Physical Therapy treatment to address deficits as defined above and maximize level of functional mobility. As demonstrated by objective findings, the assigned level of complexity for this evaluation is high.    The patient's -[facility] Basic Mobility Inpatient Short Form Raw Score is 19. A Raw score of greater than 16 suggests the patient may benefit from discharge to home. Please also refer to the recommendation of the Physical Therapist for safe discharge planning.   Goals   Patient Goals To get stronger   STG Expiration Date 05/16/25   Short Term Goal #1 Patient will: Increase bilateral LE strength 1 grade to facilitate independent mobility, Perform all bed mobility tasks independently to improve pt's independence w/ repositioning for decrease risk of skin breakdown, Perform all transfers independently consistently from various height surfaces in order to improve I w/ engagement w/ real-world environments/situations, Ambulate at least 150 ft. with no AD versus LRAD  independently w/o LOB to facilitate return and engagement w/ previous living environment, Navigate flight of stairs independently with unilateral handrail to either improve independence w/ entering home and/or so patient can fully access living areas in home, Increase all balance 1 grade to decrease risk for falls, Tolerate at least 30 consecutive minutes of activity to demonstrate improved activity tolerance and endurance, and Tolerate 3 hr OOB to faciliate upright tolerance   Plan   Treatment/Interventions ADL retraining;Functional transfer training;LE strengthening/ROM;Elevations;Therapeutic exercise;Endurance training;Patient/family training;Equipment eval/education;Bed mobility;Gait training;Compensatory technique education;Spoke to nursing;Spoke to case management;OT   PT Frequency 3-5x/wk   Discharge Recommendation   Rehab Resource Intensity Level, PT III (Minimum Resource Intensity)   Equipment Recommended   (Patient has a cane and a standard walker)   AM-PAC Basic Mobility Inpatient   Turning in Flat Bed Without Bedrails 4   Lying on Back to Sitting on Edge of Flat Bed Without Bedrails 3   Moving Bed to Chair 3   Standing Up From Chair Using Arms 3   Walk in Room 3   Climb 3-5 Stairs With Railing 3   Basic Mobility Inpatient Raw Score 19   Basic Mobility Standardized Score 42.48   [facility] Highest Level Of Mobility   -HLM Goal 6: Walk 10 steps or more   JH-HLM Achieved 7: Walk 25 feet or more   Modified Hampton Scale   Modified Romy Scale 4   Barthel Index   Feeding 10   Bathing 0   Grooming Score 0   Dressing Score 5   Bladder Score 10   Bowels Score 10   Toilet Use Score 5   Transfers (Bed/Chair) Score 10   Mobility (Level Surface) Score 0   Stairs Score 5   Barthel Index Score 55   End of Consult   Patient Position at End of Consult Supine;All needs within reach;Bed/Chair alarm activated   Licensure   [place] License Number  Marsha Magallon PT   Portions of the documentation may have been created  using voice recognition software. Occasional wrong word or sound alike substitutions may have occurred due to the inherent limitation of the voice recognition software. Read the chart carefully and recognize, using context, where substitutions have occurred.

## 2025-05-06 NOTE — ASSESSMENT & PLAN NOTE
Presented w/ L-sided weakness ~ 1 week ago & was found to have infarct in the R MCA territory. Pt endorsing gradually worsening LUE/LLE.   Placed of CVA pathway on admit   CT brain:   Known left occipital lobe metastatic mass with small focus of subacute hemorrhage, and diffuse perilesional vasogenic edema in left cerebral hemisphere (worse posteriorly) was better evaluated on MRI brain with and without contrast dated 4/21/2025. No new acute intracranial abnormality.   CTA head/neck w/o LVO or high-grade stenosis   MRI brain w/ & w/o   Slight increase in size and peripheral enhancing lesion in the left parietal occipital junction and parafalcine left parietal lobe with stable surrounding vasogenic edema   Stable foci of diffusion signal hyperintensity of the right coronary radiata and centrum semiovale white matter extending to the cortex of the right frontoparietal junction without enhancement, that could represent the sequelae of subacute infarcts versus toxic local cephalopathy from chemotherapy   New asymmetric susceptibility involving the cortex of the right pre and post central gyri without enhancement, encephalomalacia, or signal abnormalities, which is new from prior studies in 2024 that could suggest sequela of prior subarachnoid hemorrhage or prior insult.   ; recent A1c benign   Neurology following, appreciate continued recs  Management of vasogenic edema as below   PT/OT/ST consulted   C/w aspirin & statin

## 2025-05-06 NOTE — TELEPHONE ENCOUNTER
Spoke to Ayla after primary team notified about recent MRI findings.    She has subtle if any change in the left-sided metastases with no change in the vasogenic edema. Her present left-sided weakness may be related to the right-sided MRI findings which could reflect chemotherapy related encephalopathy or stroke, etc.     She has been started on a steroid taper by neurology which is a reasonable measure though it is unlikely that vasogenic edema is the culprit of her symptoms considering that it is radiographically unchanged.     At this time, there is no role for additional radiation. She has a short interval MRI scheduled and f/u scheduled in June/July.    Ayla was appreciative of the above discussion and had no further questions.

## 2025-05-06 NOTE — ASSESSMENT & PLAN NOTE
Previously on Xarelto, which was d/c'd after a new foci of hemorrhage w/in left occipital metastasis  C/w aspirin q day

## 2025-05-07 VITALS
TEMPERATURE: 98.3 F | HEART RATE: 75 BPM | SYSTOLIC BLOOD PRESSURE: 165 MMHG | BODY MASS INDEX: 21.58 KG/M2 | RESPIRATION RATE: 16 BRPM | DIASTOLIC BLOOD PRESSURE: 96 MMHG | HEIGHT: 62 IN | OXYGEN SATURATION: 93 %

## 2025-05-07 LAB
ANION GAP SERPL CALCULATED.3IONS-SCNC: 8 MMOL/L (ref 4–13)
BUN SERPL-MCNC: 24 MG/DL (ref 5–25)
CALCIUM SERPL-MCNC: 9 MG/DL (ref 8.4–10.2)
CHLORIDE SERPL-SCNC: 106 MMOL/L (ref 96–108)
CO2 SERPL-SCNC: 27 MMOL/L (ref 21–32)
CREAT SERPL-MCNC: 0.74 MG/DL (ref 0.6–1.3)
ERYTHROCYTE [DISTWIDTH] IN BLOOD BY AUTOMATED COUNT: 16.9 % (ref 11.6–15.1)
EST. AVERAGE GLUCOSE BLD GHB EST-MCNC: 114 MG/DL
GFR SERPL CREATININE-BSD FRML MDRD: 80 ML/MIN/1.73SQ M
GLUCOSE SERPL-MCNC: 98 MG/DL (ref 65–140)
HBA1C MFR BLD: 5.6 %
HCT VFR BLD AUTO: 30.6 % (ref 34.8–46.1)
HGB BLD-MCNC: 9.1 G/DL (ref 11.5–15.4)
MCH RBC QN AUTO: 27.8 PG (ref 26.8–34.3)
MCHC RBC AUTO-ENTMCNC: 29.7 G/DL (ref 31.4–37.4)
MCV RBC AUTO: 94 FL (ref 82–98)
PLATELET # BLD AUTO: 349 THOUSANDS/UL (ref 149–390)
PMV BLD AUTO: 9.2 FL (ref 8.9–12.7)
POTASSIUM SERPL-SCNC: 4.1 MMOL/L (ref 3.5–5.3)
RBC # BLD AUTO: 3.27 MILLION/UL (ref 3.81–5.12)
SODIUM SERPL-SCNC: 141 MMOL/L (ref 135–147)
WBC # BLD AUTO: 10.21 THOUSAND/UL (ref 4.31–10.16)

## 2025-05-07 PROCEDURE — 80048 BASIC METABOLIC PNL TOTAL CA: CPT | Performed by: PHYSICIAN ASSISTANT

## 2025-05-07 PROCEDURE — 85027 COMPLETE CBC AUTOMATED: CPT | Performed by: PHYSICIAN ASSISTANT

## 2025-05-07 PROCEDURE — 99239 HOSP IP/OBS DSCHRG MGMT >30: CPT

## 2025-05-07 RX ORDER — QUETIAPINE FUMARATE 25 MG/1
12.5 TABLET, FILM COATED ORAL
Qty: 15 TABLET | Refills: 0 | Status: ON HOLD | OUTPATIENT
Start: 2025-05-07

## 2025-05-07 RX ORDER — DEXAMETHASONE 2 MG/1
TABLET ORAL
Qty: 102 TABLET | Refills: 0 | Status: SHIPPED | OUTPATIENT
Start: 2025-05-07 | End: 2025-05-15

## 2025-05-07 RX ADMIN — PANTOPRAZOLE SODIUM 40 MG: 40 TABLET, DELAYED RELEASE ORAL at 05:13

## 2025-05-07 RX ADMIN — LEVETIRACETAM 1000 MG: 500 TABLET, FILM COATED ORAL at 09:58

## 2025-05-07 RX ADMIN — SULFAMETHOXAZOLE AND TRIMETHOPRIM 1 TABLET: 800; 160 TABLET ORAL at 09:58

## 2025-05-07 RX ADMIN — LEVOTHYROXINE SODIUM 50 MCG: 0.05 TABLET ORAL at 05:13

## 2025-05-07 RX ADMIN — DEXAMETHASONE 4 MG: 4 TABLET ORAL at 09:59

## 2025-05-07 RX ADMIN — ALUMINUM HYDROXIDE, MAGNESIUM HYDROXIDE, AND DIMETHICONE 30 ML: 200; 20; 200 SUSPENSION ORAL at 09:58

## 2025-05-07 RX ADMIN — GABAPENTIN 200 MG: 100 CAPSULE ORAL at 09:58

## 2025-05-07 RX ADMIN — ACETAMINOPHEN 975 MG: 325 TABLET, FILM COATED ORAL at 09:58

## 2025-05-07 RX ADMIN — CYANOCOBALAMIN TAB 500 MCG 1000 MCG: 500 TAB at 09:58

## 2025-05-07 RX ADMIN — ASPIRIN 81 MG CHEWABLE TABLET 81 MG: 81 TABLET CHEWABLE at 09:58

## 2025-05-07 RX ADMIN — AMLODIPINE BESYLATE 5 MG: 5 TABLET ORAL at 09:58

## 2025-05-07 NOTE — ASSESSMENT & PLAN NOTE
Recent Labs     05/05/25  1044 05/06/25  1712 05/07/25  0511   HGB 10.6* 9.9* 9.1*     CBC pending   Trend   In setting of chronic disease

## 2025-05-07 NOTE — DISCHARGE INSTR - AVS FIRST PAGE
Dear Ayla Nye,     It was our pleasure to care for you here at [facility].  It is our hope that we were always able to exceed the expected standards for your care during your stay.  You were hospitalized due to ***.  You were cared for on the *** floor by Shazia Hylton PA-C under the service of Christianne Harrington MD with the [facility] who covers for your primary care physician (PCP), Rafy Angelo MD, while you were hospitalized.  If you have any questions or concerns related to this hospitalization, you may contact us at .  For follow up as well as any medication refills, we recommend that you follow up with your primary care physician.  A registered nurse will reach out to you by phone within a few days after your discharge to answer any additional questions that you may have after going home.  However, at this time we provide for you here, the most important instructions / recommendations at discharge:     Notable Medication Adjustments -   Decadron taper from 4 mg twice a day for the next three days. Then decrease dose to 2 mg three times a day from that point. Follow up with rad onc outpatient.  Seroquel and melotonin at night to help with insomnia from steroids.  Testing Required after Discharge -   Radiation oncology follow up regarding steroid taper and outpatient MRI  ** Please contact your PCP to request testing orders for any of the testing recommended here **  Please review this entire after visit summary as additional general instructions including medication list, appointments, activity, diet, any pertinent wound care, and other additional recommendations from your care team that may be provided for you.      Sincerely,     Shazia Hylton PA-C

## 2025-05-07 NOTE — PLAN OF CARE
Problem: PAIN - ADULT  Goal: Verbalizes/displays adequate comfort level or baseline comfort level  Description: Interventions:- Encourage patient to monitor pain and request assistance- Assess pain using appropriate pain scale- Administer analgesics based on type and severity of pain and evaluate response- Implement non-pharmacological measures as appropriate and evaluate response- Consider cultural and social influences on pain and pain management- Notify physician/advanced practitioner if interventions unsuccessful or patient reports new pain  Outcome: Adequate for Discharge     Problem: INFECTION - ADULT  Goal: Absence or prevention of progression during hospitalization  Description: INTERVENTIONS:- Assess and monitor for signs and symptoms of infection- Monitor lab/diagnostic results- Monitor all insertion sites, i.e. indwelling lines, tubes, and drains- Monitor endotracheal if appropriate and nasal secretions for changes in amount and color- [place] appropriate cooling/warming therapies per order- Administer medications as ordered- Instruct and encourage patient and family to use good hand hygiene technique- Identify and instruct in appropriate isolation precautions for identified infection/condition  Outcome: Adequate for Discharge  Goal: Absence of fever/infection during neutropenic period  Description: INTERVENTIONS:- Monitor WBC  Outcome: Adequate for Discharge     Problem: SAFETY ADULT  Goal: Patient will remain free of falls  Description: INTERVENTIONS:- Educate patient/family on patient safety including physical limitations- Instruct patient to call for assistance with activity - Consult OT/PT to assist with strengthening/mobility - Keep Call bell within reach- Keep bed low and locked with side rails adjusted as appropriate- Keep care items and personal belongings within reach- Initiate and maintain comfort rounds- Make Fall Risk Sign visible to staff- Offer Toileting every 2 Hours, in advance of  need- Initiate/Maintain alarm- Obtain necessary fall risk management equipment: - Apply yellow socks and bracelet for high fall risk patients- Consider moving patient to room near nurses station  Outcome: Adequate for Discharge  Goal: Maintain or return to baseline ADL function  Description: INTERVENTIONS:-  Assess patient's ability to carry out ADLs; assess patient's baseline for ADL function and identify physical deficits which impact ability to perform ADLs (bathing, care of mouth/teeth, toileting, grooming, dressing, etc.)- Assess/evaluate cause of self-care deficits - Assess range of motion- Assess patient's mobility; develop plan if impaired- Assess patient's need for assistive devices and provide as appropriate- Encourage maximum independence but intervene and supervise when necessary- Involve family in performance of ADLs- Assess for home care needs following discharge - Consider OT consult to assist with ADL evaluation and planning for discharge- Provide patient education as appropriate  Outcome: Adequate for Discharge  Goal: Maintains/Returns to pre admission functional level  Description: INTERVENTIONS:- Perform AM-PAC 6 Click Basic Mobility/ Daily Activity assessment daily.- Set and communicate daily mobility goal to care team and patient/family/caregiver. - Collaborate with rehabilitation services on mobility goals if consulted- Perform Range of Motion 2 times a day.- Reposition patient every 2 hours.- Dangle patient 2 times a day- Stand patient 2 times a day- Ambulate patient 2 times a day- Out of bed to chair 2 times a day - Out of bed for meals 2 times a day- Out of bed for toileting- Record patient progress and toleration of activity level   Outcome: Adequate for Discharge     Problem: DISCHARGE PLANNING  Goal: Discharge to home or other facility with appropriate resources  Description: INTERVENTIONS:- Identify barriers to discharge w/patient and caregiver- Arrange for needed discharge resources and  transportation as appropriate- Identify discharge learning needs (meds, wound care, etc.)- Arrange for interpretive services to assist at discharge as needed- Refer to Case Management Department for coordinating discharge planning if the patient needs post-hospital services based on physician/advanced practitioner order or complex needs related to functional status, cognitive ability, or social support system  Outcome: Adequate for Discharge     Problem: Knowledge Deficit  Goal: Patient/family/caregiver demonstrates understanding of disease process, treatment plan, medications, and discharge instructions  Description: Complete learning assessment and assess knowledge base.Interventions:- Provide teaching at level of understanding- Provide teaching via preferred learning methods  Outcome: Adequate for Discharge     Problem: Neurological Deficit  Goal: Neurological status is stable or improving  Description: Interventions:- Monitor and assess patient's level of consciousness, motor function, sensory function, and level of assistance needed for ADLs. - Monitor and report changes from baseline. Collaborate with interdisciplinary team to initiate plan and implement interventions as ordered. - Provide and maintain a safe environment.- Consider seizure precautions.- Consider fall precautions.- Consider aspiration precautions.- Consider bleeding precautions.  Outcome: Adequate for Discharge     Problem: Activity Intolerance/Impaired Mobility  Goal: Mobility/activity is maintained at optimum level for patient  Description: Interventions:- Assess and monitor patient  barriers to mobility and need for assistive/adaptive devices.- Assess patient's emotional response to limitations.- Collaborate with interdisciplinary team and initiate plans and interventions as ordered.- Encourage independent activity per ability.- Maintain proper body alignment.- Perform active/passive rom as tolerated/ordered.- Plan activities to conserve  energy.- Turn patient as appropriate  Outcome: Adequate for Discharge     Problem: Communication Impairment  Goal: Ability to express needs and understand communication  Description: Assess patient's communication skills and ability to understand information.  Patient will demonstrate use of effective communication techniques, alternative methods of communication and understanding even if not able to speak. - Encourage communication and provide alternate methods of communication as needed.- Collaborate with case management/ for discharge needs.- Include patient/family/caregiver in decisions related to communication.  Outcome: Adequate for Discharge     Problem: Potential for Aspiration  Goal: Non-ventilated patient's risk of aspiration is minimized  Description: Assess and monitor vital signs, respiratory status, and labs (WBC).  Monitor for signs of aspiration (tachypnea, cough, rales, wheezing, cyanosis, fever).- Assess and monitor patient's ability to swallow.- Place patient up in chair to eat if possible.- HOB up at 90 degrees to eat if unable to get patient up into chair.- Supervise patient during oral intake. - Instruct patient/ family to take small bites.- Instruct patient/ family to take small single sips when taking liquids.- Follow patient-specific strategies generated by speech pathologist.  Outcome: Adequate for Discharge  Goal: Ventilated patient's risk of aspiration is minimized  Description: Assess and monitor vital signs, respiratory status, airway cuff pressure, and labs (WBC).  Monitor for signs of aspiration (tachypnea, cough, rales, wheezing, cyanosis, fever).- Elevate head of bed 30 degrees if patient has tube feeding.- Monitor tube feeding.  Outcome: Adequate for Discharge     Problem: Nutrition  Goal: Nutrition/Hydration status is improving  Description: Monitor and assess patient's nutrition/hydration status for malnutrition (ex- brittle hair, bruises, dry skin, pale skin and  conjunctiva, muscle wasting, smooth red tongue, and disorientation). Collaborate with interdisciplinary team and initiate plan and interventions as ordered.  Monitor patient's weight and dietary intake as ordered or per policy. Utilize nutrition screening tool and intervene per policy. Determine patient's food preferences and provide high-protein, high-caloric foods as appropriate. - Assist patient with eating.- Allow adequate time for meals.- Encourage patient to take dietary supplement as ordered.- Collaborate with clinical nutritionist.- Include patient/family/caregiver in decisions related to nutrition.  Outcome: Adequate for Discharge     Problem: PHYSICAL THERAPY ADULT  Goal: Performs mobility at highest level of function for planned discharge setting.  See evaluation for individualized goals.  Description: Treatment/Interventions: ADL retraining, Functional transfer training, LE strengthening/ROM, Elevations, Therapeutic exercise, Endurance training, Patient/family training, Equipment eval/education, Bed mobility, Gait training, Compensatory technique education, Spoke to nursing, Spoke to case management, OT  Equipment Recommended:  (Patient has a cane and a standard walker)       See flowsheet documentation for full assessment, interventions and recommendations.  Outcome: Adequate for Discharge     Problem: Nutrition/Hydration-ADULT  Goal: Nutrient/Hydration intake appropriate for improving, restoring or maintaining nutritional needs  Description: Monitor and assess patient's nutrition/hydration status for malnutrition. Collaborate with interdisciplinary team and initiate plan and interventions as ordered.  Monitor patient's weight and dietary intake as ordered or per policy. Utilize nutrition screening tool and intervene as necessary. Determine patient's food preferences and provide high-protein, high-caloric foods as appropriate. INTERVENTIONS:- Monitor oral intake, urinary output, labs, and treatment  plans- Assess nutrition and hydration status and recommend course of action- Evaluate amount of meals eaten- Assist patient with eating if necessary - Allow adequate time for meals- Recommend/ encourage appropriate diets, oral nutritional supplements, and vitamin/mineral supplements- Order, calculate, and assess calorie counts as needed- Recommend, monitor, and adjust tube feedings and TPN/PPN based on assessed needs- Assess need for intravenous fluids- Provide specific nutrition/hydration education as appropriate- Include patient/family/caregiver in decisions related to nutrition  Outcome: Adequate for Discharge     Problem: OCCUPATIONAL THERAPY ADULT  Goal: Performs self-care activities at highest level of function for planned discharge setting.  See evaluation for individualized goals.  Description: Treatment Interventions: ADL retraining, Functional transfer training, Endurance training, UE strengthening/ROM, Patient/family training, Equipment evaluation/education, Activityengagement, Neuromuscular reeducation          See flowsheet documentation for full assessment, interventions and recommendations.   Outcome: Adequate for Discharge

## 2025-05-07 NOTE — INCIDENTAL FINDINGS
The following findings require follow up:  Radiographic finding   Finding: CTA head and neck with and without contrast: CT Brain:, - Known left occipital lobe metastatic mass with small focus of subacute hemorrhage, and diffuse perilesional vasogenic edema in left cerebral hemisphere (worse posteriorly) was better evaluated on MRI brain with and without contrast dated 4/21/2025. No , new acute intracranial abnormality. Consider follow-up MRI brain with and without contrast for further evaluation., CT Angiography:, - Negative CTA head and neck for large vessel occlusion, dissection, aneurysm, or high-grade stenosis., - Partially imaged known right upper lobe lung mass with adjacent paramediastinal fibrosis, small left upper lobe metastatic pulmonary nodule. Recommend follow-up dedicated CT chest with contrast per oncologic recommendations.    Follow up required: MRI which was completed  Follow-up with radiation oncology outpatient where another repeat MRI outpatient as needed.  CT chest to complete completed in the outpatient setting as deemed appropriate by radiation oncology  Incidental finding results were discussed with the Patient by Shazia Hylton PA-C on 05/07/25.   They expressed understanding and all questions answered.

## 2025-05-07 NOTE — DISCHARGE SUMMARY
Discharge Summary - Hospitalist   Name: Ayla Nye 74 y.o. female I MRN: [id number]  Unit/Bed#: S -01 I Date of Admission: 5/5/2025   Date of Service: 5/7/2025 I Hospital Day: 2     Assessment & Plan  Stroke-like symptoms  Presented w/ L-sided weakness ~ 1 week ago & was found to have infarct in the R MCA territory. Pt endorsing gradually worsening LUE/LLE.   Placed of CVA pathway on admit   CT brain:   Known left occipital lobe metastatic mass with small focus of subacute hemorrhage, and diffuse perilesional vasogenic edema in left cerebral hemisphere (worse posteriorly) was better evaluated on MRI brain with and without contrast dated 4/21/2025. No new acute intracranial abnormality.   CTA head/neck w/o LVO or high-grade stenosis   MRI brain w/ & w/o   Slight increase in size and peripheral enhancing lesion in the left parietal occipital junction and parafalcine left parietal lobe with stable surrounding vasogenic edema   Stable foci of diffusion signal hyperintensity of the right coronary radiata and centrum semiovale white matter extending to the cortex of the right frontoparietal junction without enhancement, that could represent the sequelae of subacute infarcts versus toxic local cephalopathy from chemotherapy   New asymmetric susceptibility involving the cortex of the right pre and post central gyri without enhancement, encephalomalacia, or signal abnormalities, which is new from prior studies in 2024 that could suggest sequela of prior subarachnoid hemorrhage or prior insult.   ; recent A1c benign   Neurology following, appreciate continued recs  Prior provider discussed with radiation oncology who recommends continuing Decadron on a taper, short interval MRI to be scheduled, outpatient follow-up with Dr. Gamboa.  Management of vasogenic edema as below   PT/OT/ST consulted   C/w aspirin & statin   Vasogenic brain edema (HCC)  2/2 to metastatic adenocarcinoma  Maintained on Decadron 2 mg  "BID PTA  Per neuro recs, Decadron 10 mg x 1 now, then 4 mg BID x 3 days, then 2 mg TID x 3 days, then 2 mg BID   Prior provider discussed with radiation oncology, recommend continue with Decadron taper, follow-up MRI outpatient, follow-up radiation oncology outpatient with Dr. 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)  Management as above   Consult rad/onc   History of stroke  R frontal subacute infarct, noted on imaging on 04/21/25  C/w aspirin & statin   History of pulmonary embolism  Previously on Xarelto, which was d/c'd after a new foci of hemorrhage w/in left occipital metastasis  C/w aspirin q day  Anemia  Recent Labs     05/05/25  1044 05/06/25  1712 05/07/25  0511   HGB 10.6* 9.9* 9.1*     CBC pending   Trend   In setting of chronic disease   Leukocytosis  Recent Labs     05/05/25  1044 05/06/25  1712 05/07/25  0511   WBC 14.24* 13.47* 10.21*     2/2 to Decadron   Trend   Abnormal CT of the chest  Noted on imaging \"Partially imaged known right upper lobe lung mass with adjacent paramediastinal fibrosis, small left upper lobe metastatic pulmonary nodule.\"  Recommend follow-up dedicated CT chest with contrast per oncologic recommendations   HTN (hypertension)  BP stable  Resume PTA amlodipine on 05/07     Medical Problems       Resolved Problems  Date Reviewed: 5/1/2025   None       Discharging Physician / Practitioner: Shazia Hylton PA-C  PCP: Rafy Angelo MD  Admission Date:   Admission Orders (From admission, onward)       Ordered        05/05/25 1306  INPATIENT ADMISSION  Once                          Discharge Date: 05/07/25    Consultations During Hospital Stay:  Radiation oncology, neurology    Procedures Performed:   MRI brain w wo contrast  Result Date: 5/6/2025  Impression: 1.  Slight increase in size of the peripherally enhancing lesions involving the left parietal occipital junction, and the parafalcine left parietal lobe, with stable surrounding vasogenic edema. These may " represent evolving posttreatment changes, but continued attention on subsequent studies is recommended. 2.  No new intracranial enhancing lesions noted. 3.  Stable foci of diffusion signal hyperintensity involving the right corona radiata and centrum semiovale white matter extending to the cortex of the right frontoparietal junction, without associated enhancement. While this may represent the sequela of  subacute infarcts, asymmetric toxic leukoencephalopathy from chemotherapy agent is also a consideration. 4.  Asymmetric susceptibility involving the cortex of the right pre and postcentral gyri, without associated enhancement, encephalomalacia or additional signal abnormalities. This is new when compared to prior studies from 2024, and may represent a sequela of prior subarachnoid hemorrhage in this region, or sequela of prior insult. 5.  Moderate chronic white matter microangiopathic changes. Workstation performed: [id number]     CTA head and neck with and without contrast  Result Date: 5/5/2025  Impression: CT Brain: - Known left occipital lobe metastatic mass with small focus of subacute hemorrhage, and diffuse perilesional vasogenic edema in left cerebral hemisphere (worse posteriorly) was better evaluated on MRI brain with and without contrast dated 4/21/2025. No new acute intracranial abnormality. Consider follow-up MRI brain with and without contrast for further evaluation. CT Angiography: - Negative CTA head and neck for large vessel occlusion, dissection, aneurysm, or high-grade stenosis. - Partially imaged known right upper lobe lung mass with adjacent paramediastinal fibrosis, small left upper lobe metastatic pulmonary nodule. Recommend follow-up dedicated CT chest with contrast per oncologic recommendations. Additional chronic/incidental findings as detailed above. The study was marked in EPIC for immediate notification. Workstation performed: [id number]     Significant Findings / Test Results:   See  "above    Incidental Findings:          Test Results Pending at Discharge (will require follow up):        Outpatient Tests Requested:  Follow-up with radiation oncology outpatient    Complications: None    Reason for Admission: Left-sided weakness    Hospital Course:   Ayla Nye is a 74 y.o. female patient who originally presented to the hospital on 5/5/2025 PMH of metastatic lung cancer, just recently admitted here for left-sided weakness found to have right MCA territory frontal lobe stroke, who presents with left-sided weakness.      We are getting an MRI, initially neurology had recommended with and without contrast however now we have changed this to without given that the patient had with and without last week.  Admit her on the stroke pathway continue Decadron aspirin and Plavix.     She does have a history of PE that she is off anticoagulation because of her brain mets and slight hemorrhagic pain just seen on previous imaging as well as CTA a today.    During hospitalization was found to have no new peristasis or diffusion weighted abnormalities suspected symptoms are from vasogenic edema.  Radiation oncology consulted and recommending continuing Decadron taper and follow-up with repeat MRI outpatient as well as radiation oncology follow-up in the office.  At this time patient is concerned of by neurology and radiation oncology is medically stable for discharge with outpatient follow-up.    Please see above list of diagnoses and related plan for additional information.     Condition at Discharge: stable    Discharge Day Visit / Exam:   Subjective: Patient offering no complaints today.  Remains with left-sided weakness that is improved from yesterday  Vitals: Blood Pressure: 156/85 (05/06/25 2254)  Pulse: 74 (05/06/25 2254)  Temperature: 97.6 °F (36.4 °C) (05/06/25 2254)  Temp Source: Oral (05/06/25 2254)  Respirations: 19 (05/06/25 2254)  Height: 5' 2\" (157.5 cm) (05/05/25 1022)  SpO2: 96 % (05/06/25 " 9921)  Physical Exam  Constitutional:       Comments: Thin, frail, chronically ill in appearance, very pleasant   HENT:      Head: Normocephalic.      Right Ear: External ear normal.      Left Ear: External ear normal.      Nose: Nose normal.      Mouth/Throat:      Mouth: Mucous membranes are moist.      Pharynx: Oropharynx is clear.   Eyes:      Extraocular Movements: Extraocular movements intact.      Conjunctiva/sclera: Conjunctivae normal.   Cardiovascular:      Rate and Rhythm: Normal rate and regular rhythm.      Pulses: Normal pulses.      Heart sounds: Normal heart sounds.   Pulmonary:      Effort: Pulmonary effort is normal. No respiratory distress.      Breath sounds: Normal breath sounds. No wheezing or rales.   Abdominal:      General: Abdomen is flat. Bowel sounds are normal. There is no distension.      Palpations: Abdomen is soft.      Tenderness: There is no abdominal tenderness. There is no guarding or rebound.   Musculoskeletal:         General: Normal range of motion.      Cervical back: Normal range of motion.      Right lower leg: No edema.      Left lower leg: No edema.   Skin:     General: Skin is warm and dry.   Neurological:      Mental Status: She is alert and oriented to person, place, and time. Mental status is at baseline.   Psychiatric:         Mood and Affect: Mood normal.         Behavior: Behavior normal.          Discussion with Family: Updated  (daughter) via phone.    Discharge instructions/Information to patient and family:   See after visit summary for information provided to patient and family.      Provisions for Follow-Up Care:  See after visit summary for information related to follow-up care and any pertinent home health orders.      Mobility at time of Discharge:   Basic Mobility Inpatient Raw Score: 19  JH-HLM Goal: 6: Walk 10 steps or more  JH-HLM Achieved: 7: Walk 25 feet or more  HLM Goal achieved. Continue to encourage appropriate mobility.      Disposition:   Home with [facility] Services (Reminder: Complete face to face encounter)    Planned Readmission: no    Discharge Medications:  See after visit summary for reconciled discharge medications provided to patient and/or family.      Administrative Statements   Discharge Statement:  I have spent a total time of 45 minutes in caring for this patient on the day of the visit/encounter. >30 minutes of time was spent on: Diagnostic results, Prognosis, Risks and benefits of tx options, Instructions for management, Patient and family education, Counseling / Coordination of care, Documenting in the medical record, Reviewing / ordering tests, medicine, procedures  , and Communicating with other healthcare professionals .    **Please Note: This note may have been constructed using a voice recognition system**

## 2025-05-07 NOTE — ASSESSMENT & PLAN NOTE
Presented w/ L-sided weakness ~ 1 week ago & was found to have infarct in the R MCA territory. Pt endorsing gradually worsening LUE/LLE.   Placed of CVA pathway on admit   CT brain:   Known left occipital lobe metastatic mass with small focus of subacute hemorrhage, and diffuse perilesional vasogenic edema in left cerebral hemisphere (worse posteriorly) was better evaluated on MRI brain with and without contrast dated 4/21/2025. No new acute intracranial abnormality.   CTA head/neck w/o LVO or high-grade stenosis   MRI brain w/ & w/o   Slight increase in size and peripheral enhancing lesion in the left parietal occipital junction and parafalcine left parietal lobe with stable surrounding vasogenic edema   Stable foci of diffusion signal hyperintensity of the right coronary radiata and centrum semiovale white matter extending to the cortex of the right frontoparietal junction without enhancement, that could represent the sequelae of subacute infarcts versus toxic local cephalopathy from chemotherapy   New asymmetric susceptibility involving the cortex of the right pre and post central gyri without enhancement, encephalomalacia, or signal abnormalities, which is new from prior studies in 2024 that could suggest sequela of prior subarachnoid hemorrhage or prior insult.   ; recent A1c benign   Neurology following, appreciate continued recs  Prior provider discussed with radiation oncology who recommends continuing Decadron on a taper, short interval MRI to be scheduled, outpatient follow-up with Dr. Gamboa.  Management of vasogenic edema as below   PT/OT/ST consulted   C/w aspirin & statin

## 2025-05-07 NOTE — ASSESSMENT & PLAN NOTE
2/2 to metastatic adenocarcinoma  Maintained on Decadron 2 mg BID PTA  Per neuro recs, Decadron 10 mg x 1 now, then 4 mg BID x 3 days, then 2 mg TID x 3 days, then 2 mg BID   Prior provider discussed with radiation oncology, recommend continue with Decadron taper, follow-up MRI outpatient, follow-up radiation oncology outpatient with Dr. Vuong

## 2025-05-07 NOTE — ASSESSMENT & PLAN NOTE
Recent Labs     05/05/25  1044 05/06/25  1712 05/07/25  0511   WBC 14.24* 13.47* 10.21*     2/2 to Decadron   Trend

## 2025-05-08 ENCOUNTER — TELEPHONE (OUTPATIENT)
Age: 75
End: 2025-05-08

## 2025-05-12 ENCOUNTER — TELEPHONE (OUTPATIENT)
Age: 75
End: 2025-05-12

## 2025-05-12 ENCOUNTER — APPOINTMENT (EMERGENCY)
Dept: CT IMAGING | Facility: HOSPITAL | Age: 75
DRG: 180 | End: 2025-05-12
Payer: MEDICARE

## 2025-05-12 ENCOUNTER — TELEPHONE (OUTPATIENT)
Dept: OTHER | Facility: OTHER | Age: 75
End: 2025-05-12

## 2025-05-12 ENCOUNTER — HOSPITAL ENCOUNTER (INPATIENT)
Facility: HOSPITAL | Age: 75
LOS: 3 days | DRG: 180 | End: 2025-05-15
Attending: EMERGENCY MEDICINE | Admitting: INTERNAL MEDICINE
Payer: MEDICARE

## 2025-05-12 DIAGNOSIS — G93.6 VASOGENIC BRAIN EDEMA (HCC): ICD-10-CM

## 2025-05-12 DIAGNOSIS — C34.90 LUNG CANCER METASTATIC TO BRAIN (HCC): ICD-10-CM

## 2025-05-12 DIAGNOSIS — C79.31 METASTASIS TO BRAIN (HCC): ICD-10-CM

## 2025-05-12 DIAGNOSIS — C79.31 LUNG CANCER METASTATIC TO BRAIN (HCC): ICD-10-CM

## 2025-05-12 DIAGNOSIS — G93.6 CEREBRAL EDEMA (HCC): ICD-10-CM

## 2025-05-12 DIAGNOSIS — R29.90 STROKE-LIKE SYMPTOMS: Primary | ICD-10-CM

## 2025-05-12 DIAGNOSIS — R03.0 ELEVATED BLOOD PRESSURE READING: ICD-10-CM

## 2025-05-12 DIAGNOSIS — R53.1 LEFT-SIDED WEAKNESS: ICD-10-CM

## 2025-05-12 DIAGNOSIS — C34.90 LUNG CANCER (HCC): ICD-10-CM

## 2025-05-12 LAB
4HR DELTA HS TROPONIN: 0 NG/L
ALBUMIN SERPL BCG-MCNC: 4.1 G/DL (ref 3.5–5)
ALP SERPL-CCNC: 57 U/L (ref 34–104)
ALT SERPL W P-5'-P-CCNC: 10 U/L (ref 7–52)
ANION GAP SERPL CALCULATED.3IONS-SCNC: 10 MMOL/L (ref 4–13)
ANISOCYTOSIS BLD QL SMEAR: PRESENT
AST SERPL W P-5'-P-CCNC: 9 U/L (ref 13–39)
BACTERIA UR QL AUTO: ABNORMAL /HPF
BASOPHILS # BLD MANUAL: 0 THOUSAND/UL (ref 0–0.1)
BASOPHILS NFR MAR MANUAL: 0 % (ref 0–1)
BILIRUB SERPL-MCNC: 0.21 MG/DL (ref 0.2–1)
BILIRUB UR QL STRIP: NEGATIVE
BUN SERPL-MCNC: 23 MG/DL (ref 5–25)
CALCIUM SERPL-MCNC: 9.3 MG/DL (ref 8.4–10.2)
CARDIAC TROPONIN I PNL SERPL HS: 6 NG/L (ref ?–50)
CARDIAC TROPONIN I PNL SERPL HS: 6 NG/L (ref ?–50)
CHLORIDE SERPL-SCNC: 104 MMOL/L (ref 96–108)
CLARITY UR: CLEAR
CO2 SERPL-SCNC: 27 MMOL/L (ref 21–32)
COLOR UR: ABNORMAL
CREAT SERPL-MCNC: 1 MG/DL (ref 0.6–1.3)
DACRYOCYTES BLD QL SMEAR: PRESENT
EOSINOPHIL # BLD MANUAL: 0 THOUSAND/UL (ref 0–0.4)
EOSINOPHIL NFR BLD MANUAL: 0 % (ref 0–6)
ERYTHROCYTE [DISTWIDTH] IN BLOOD BY AUTOMATED COUNT: 16.8 % (ref 11.6–15.1)
GFR SERPL CREATININE-BSD FRML MDRD: 55 ML/MIN/1.73SQ M
GLUCOSE SERPL-MCNC: 177 MG/DL (ref 65–140)
GLUCOSE SERPL-MCNC: 180 MG/DL (ref 65–140)
GLUCOSE UR STRIP-MCNC: NEGATIVE MG/DL
HCT VFR BLD AUTO: 35.9 % (ref 34.8–46.1)
HGB BLD-MCNC: 10.7 G/DL (ref 11.5–15.4)
HGB UR QL STRIP.AUTO: NEGATIVE
KETONES UR STRIP-MCNC: NEGATIVE MG/DL
LEUKOCYTE ESTERASE UR QL STRIP: NEGATIVE
LG PLATELETS BLD QL SMEAR: PRESENT
LYMPHOCYTES # BLD AUTO: 0.14 THOUSAND/UL (ref 0.6–4.47)
LYMPHOCYTES # BLD AUTO: 1 % (ref 14–44)
MAGNESIUM SERPL-MCNC: 2 MG/DL (ref 1.9–2.7)
MCH RBC QN AUTO: 27.9 PG (ref 26.8–34.3)
MCHC RBC AUTO-ENTMCNC: 29.8 G/DL (ref 31.4–37.4)
MCV RBC AUTO: 94 FL (ref 82–98)
METAMYELOCYTE ABSOLUTE CT: 0.14 THOUSAND/UL (ref 0–0.1)
METAMYELOCYTES NFR BLD MANUAL: 1 % (ref 0–1)
MONOCYTES # BLD AUTO: 0.72 THOUSAND/UL (ref 0–1.22)
MONOCYTES NFR BLD: 5 % (ref 4–12)
MYELOCYTE ABSOLUTE CT: 0.29 THOUSAND/UL (ref 0–0.1)
MYELOCYTES NFR BLD MANUAL: 2 % (ref 0–1)
NEUTROPHILS # BLD MANUAL: 13.11 THOUSAND/UL (ref 1.85–7.62)
NEUTS SEG NFR BLD AUTO: 91 % (ref 43–75)
NITRITE UR QL STRIP: NEGATIVE
NON-SQ EPI CELLS URNS QL MICRO: ABNORMAL /HPF
OVALOCYTES BLD QL SMEAR: PRESENT
PH UR STRIP.AUTO: 6.5 [PH]
PLATELET # BLD AUTO: 386 THOUSANDS/UL (ref 149–390)
PLATELET BLD QL SMEAR: ABNORMAL
PMV BLD AUTO: 9.6 FL (ref 8.9–12.7)
POIKILOCYTOSIS BLD QL SMEAR: PRESENT
POTASSIUM SERPL-SCNC: 4.3 MMOL/L (ref 3.5–5.3)
PROT SERPL-MCNC: 7.1 G/DL (ref 6.4–8.4)
PROT UR STRIP-MCNC: ABNORMAL MG/DL
RBC # BLD AUTO: 3.84 MILLION/UL (ref 3.81–5.12)
RBC #/AREA URNS AUTO: ABNORMAL /HPF
RBC MORPH BLD: PRESENT
SODIUM SERPL-SCNC: 141 MMOL/L (ref 135–147)
SP GR UR STRIP.AUTO: 1.05 (ref 1–1.03)
TSH SERPL DL<=0.05 MIU/L-ACNC: 0.41 UIU/ML (ref 0.45–4.5)
UROBILINOGEN UR STRIP-ACNC: <2 MG/DL
WBC # BLD AUTO: 14.41 THOUSAND/UL (ref 4.31–10.16)
WBC #/AREA URNS AUTO: ABNORMAL /HPF

## 2025-05-12 PROCEDURE — 84443 ASSAY THYROID STIM HORMONE: CPT

## 2025-05-12 PROCEDURE — 82948 REAGENT STRIP/BLOOD GLUCOSE: CPT

## 2025-05-12 PROCEDURE — 84484 ASSAY OF TROPONIN QUANT: CPT

## 2025-05-12 PROCEDURE — 93005 ELECTROCARDIOGRAM TRACING: CPT

## 2025-05-12 PROCEDURE — 36415 COLL VENOUS BLD VENIPUNCTURE: CPT

## 2025-05-12 PROCEDURE — 83735 ASSAY OF MAGNESIUM: CPT

## 2025-05-12 PROCEDURE — 85007 BL SMEAR W/DIFF WBC COUNT: CPT

## 2025-05-12 PROCEDURE — 70498 CT ANGIOGRAPHY NECK: CPT

## 2025-05-12 PROCEDURE — 99223 1ST HOSP IP/OBS HIGH 75: CPT | Performed by: INTERNAL MEDICINE

## 2025-05-12 PROCEDURE — 99285 EMERGENCY DEPT VISIT HI MDM: CPT

## 2025-05-12 PROCEDURE — 71260 CT THORAX DX C+: CPT

## 2025-05-12 PROCEDURE — 85027 COMPLETE CBC AUTOMATED: CPT

## 2025-05-12 PROCEDURE — 70496 CT ANGIOGRAPHY HEAD: CPT

## 2025-05-12 PROCEDURE — 74177 CT ABD & PELVIS W/CONTRAST: CPT

## 2025-05-12 PROCEDURE — 81001 URINALYSIS AUTO W/SCOPE: CPT

## 2025-05-12 PROCEDURE — 80053 COMPREHEN METABOLIC PANEL: CPT

## 2025-05-12 RX ORDER — SULFAMETHOXAZOLE AND TRIMETHOPRIM 800; 160 MG/1; MG/1
1 TABLET ORAL 3 TIMES WEEKLY
Status: DISCONTINUED | OUTPATIENT
Start: 2025-05-14 | End: 2025-05-15 | Stop reason: HOSPADM

## 2025-05-12 RX ORDER — AMLODIPINE BESYLATE 5 MG/1
5 TABLET ORAL DAILY
Status: DISCONTINUED | OUTPATIENT
Start: 2025-05-13 | End: 2025-05-15 | Stop reason: HOSPADM

## 2025-05-12 RX ORDER — ACETAMINOPHEN 325 MG/1
975 TABLET ORAL EVERY 6 HOURS PRN
Status: DISCONTINUED | OUTPATIENT
Start: 2025-05-12 | End: 2025-05-13

## 2025-05-12 RX ORDER — ASPIRIN 81 MG/1
81 TABLET, CHEWABLE ORAL DAILY
Status: DISCONTINUED | OUTPATIENT
Start: 2025-05-13 | End: 2025-05-15 | Stop reason: HOSPADM

## 2025-05-12 RX ORDER — HYDROMORPHONE HYDROCHLORIDE 2 MG/1
2 TABLET ORAL EVERY 4 HOURS PRN
Status: DISCONTINUED | OUTPATIENT
Start: 2025-05-12 | End: 2025-05-13

## 2025-05-12 RX ORDER — ONDANSETRON 4 MG/1
4 TABLET, ORALLY DISINTEGRATING ORAL EVERY 6 HOURS PRN
Status: DISCONTINUED | OUTPATIENT
Start: 2025-05-12 | End: 2025-05-15 | Stop reason: HOSPADM

## 2025-05-12 RX ORDER — LEVOTHYROXINE SODIUM 50 UG/1
50 TABLET ORAL
Status: DISCONTINUED | OUTPATIENT
Start: 2025-05-13 | End: 2025-05-15 | Stop reason: HOSPADM

## 2025-05-12 RX ORDER — LEVETIRACETAM 500 MG/1
1000 TABLET ORAL 2 TIMES DAILY
Status: DISCONTINUED | OUTPATIENT
Start: 2025-05-12 | End: 2025-05-15 | Stop reason: HOSPADM

## 2025-05-12 RX ORDER — PANTOPRAZOLE SODIUM 40 MG/1
40 TABLET, DELAYED RELEASE ORAL
Status: DISCONTINUED | OUTPATIENT
Start: 2025-05-13 | End: 2025-05-15 | Stop reason: HOSPADM

## 2025-05-12 RX ORDER — GABAPENTIN 100 MG/1
200 CAPSULE ORAL DAILY
Status: DISCONTINUED | OUTPATIENT
Start: 2025-05-13 | End: 2025-05-15 | Stop reason: HOSPADM

## 2025-05-12 RX ORDER — ATORVASTATIN CALCIUM 40 MG/1
40 TABLET, FILM COATED ORAL
Status: DISCONTINUED | OUTPATIENT
Start: 2025-05-12 | End: 2025-05-15 | Stop reason: HOSPADM

## 2025-05-12 RX ORDER — QUETIAPINE FUMARATE 25 MG/1
12.5 TABLET, FILM COATED ORAL
Status: DISCONTINUED | OUTPATIENT
Start: 2025-05-12 | End: 2025-05-15 | Stop reason: HOSPADM

## 2025-05-12 RX ORDER — DEXAMETHASONE 2 MG/1
2 TABLET ORAL EVERY 8 HOURS SCHEDULED
Status: DISCONTINUED | OUTPATIENT
Start: 2025-05-12 | End: 2025-05-15

## 2025-05-12 RX ORDER — SENNOSIDES 8.6 MG
1 TABLET ORAL DAILY
Status: DISCONTINUED | OUTPATIENT
Start: 2025-05-13 | End: 2025-05-15 | Stop reason: HOSPADM

## 2025-05-12 RX ORDER — MAGNESIUM HYDROXIDE/ALUMINUM HYDROXICE/SIMETHICONE 120; 1200; 1200 MG/30ML; MG/30ML; MG/30ML
30 SUSPENSION ORAL EVERY 4 HOURS PRN
Status: DISCONTINUED | OUTPATIENT
Start: 2025-05-12 | End: 2025-05-15 | Stop reason: HOSPADM

## 2025-05-12 RX ORDER — GABAPENTIN 300 MG/1
300 CAPSULE ORAL
Status: DISCONTINUED | OUTPATIENT
Start: 2025-05-12 | End: 2025-05-15 | Stop reason: HOSPADM

## 2025-05-12 RX ADMIN — IOHEXOL 85 ML: 350 INJECTION, SOLUTION INTRAVENOUS at 14:31

## 2025-05-12 RX ADMIN — DEXAMETHASONE 2 MG: 2 TABLET ORAL at 21:20

## 2025-05-12 RX ADMIN — Medication 3 MG: at 21:20

## 2025-05-12 RX ADMIN — LEVETIRACETAM 1000 MG: 500 TABLET, FILM COATED ORAL at 21:19

## 2025-05-12 RX ADMIN — ATORVASTATIN CALCIUM 40 MG: 40 TABLET, FILM COATED ORAL at 18:22

## 2025-05-12 RX ADMIN — QUETIAPINE FUMARATE 12.5 MG: 25 TABLET ORAL at 21:20

## 2025-05-12 RX ADMIN — GABAPENTIN 300 MG: 300 CAPSULE ORAL at 21:20

## 2025-05-12 RX ADMIN — ACETAMINOPHEN 975 MG: 325 TABLET, FILM COATED ORAL at 18:22

## 2025-05-12 NOTE — TELEPHONE ENCOUNTER
Called and spoke to Ayla to let her know her appointment was switched to a virtual. Ayla says she might end up going to the ED today because her legs can hardly hold her up. She has been evaluated for weakness on her left side, but she says now her right leg is buckling. Ayla states she spoke to her neurologist today and they think she should be evaluated in the ED. Ayla is waiting for her sister to come over and then she will make the decision about calling an ambulance or not. I will make Dr. Castro aware. Ayla appreciated the call

## 2025-05-12 NOTE — ASSESSMENT & PLAN NOTE
CC: Fall at home on aspirin.  Recent admission on 5/5 and discharged on 5/7 for SLS and found to have infarct in R MCA territory.  Continue with aspirin and statin, Xarelto discontinued in April 2025 after new foci of hemorrhage within the left occipital metastasis.  Vasogenic brain edema, maintained on dexamethasone.  CT CAP with contrast:  Evidence of disease progression with several new and enlarging lesions, new lesions in right lower lobe, right thigh intramuscular mass (R thigh mass contrast enhancing, possible hematoma however not superficial on imaging).  Redemonstrated heterogeneous right renal mass A/W urothelial enhancement, concerning for metastatic lesion with probable involvement of the collecting system.  CTA head and neck:  Redemonstrated vasogenic edema left parietal vertex.  No hemorrhage identified.  No significant carotid or vertebral artery stenosis, no focal intracranial stenosis or aneurysm.  Positive labs:  Negative labs: UA, troponin, CBC (baseline leukocytosis, ACD), CMP, LDL, A1c, glucose on admission.  Pending labs: Magnesium  Low concern for infectious or metabolic etiology, no acute traumatic injury noted on imaging.  Likely related to increasing burden of metastasis or recrudescence.    Plan:  Palliative care, neurology consult.  Consider oncology consult, patient stated that she was supposed to start chemotherapy this week.  MRI brain wo contrast.  Follow-up TSH.  PT/OT when appropriate.

## 2025-05-12 NOTE — ASSESSMENT & PLAN NOTE
Continue home gabapentin, hydromorphone 2 mg every 4 hours as needed, acetaminophen 975 mg.  Patient states that her pain is well controlled on this regimen.

## 2025-05-12 NOTE — ED PROCEDURE NOTE
Procedure  POC FAST US    Date/Time: 5/12/2025 7:34 PM    Performed by: Eileen Patino MD  Authorized by: Eileen Patino MD    Patient location:  ED  Procedure performed by consultant: Shwetha.    Procedure details:     Exam Type:  Diagnostic and educational    Assess for:  Intra-abdominal fluid, pericardial effusion, pneumothorax and hemothorax    Technique: extended FAST      Views obtained:  Heart - Pericardial sac, RUQ - Dutta's Pouch, LUQ - Splenorenal space, Suprapubic - Pouch of Aramis, Right thorax and Left thorax    Image quality: limited diagnostic      Image availability:  Video obtained  FAST Findings:     RUQ (Hepatorenal) free fluid: absent      LUQ (Splenorenal) free fluid: absent      Suprapubic free fluid: absent      Cardiac wall motion: identified      Pericardial effusion: trace    extended FAST (Pulmonary) findings:     Left lung sliding: Present      Right lung sliding: Present      Left pleural effusion: Absent      Right pleural effusion: Absent    Interpretation:     Impressions: negative    POC Ocular US    Date/Time: 5/12/2025 7:36 PM    Performed by: Eileen Patino MD  Authorized by: Eileen Patino MD    Patient location:  ED  Performed by:  Resident  Procedure performed by consultant: shwetha.    Procedure details:     Exam Type:  Diagnostic and educational    Assessment for: optic nerve sheath diameter      Eye(s) assessed:  Both eyes    Structures visualized: posterior chamber, lens and optic nerve      Image quality: limited diagnostic      Image availability:  Video obtained  Left eye findings:     Foreign body: not identified      Retinal contour: normal      Lens: normal      Vitreous body: anechoic      Left optic nerve sheath diameter: normal (less than or equal to 5 mm)      Left optic nerve sheath diameter (mm):  4  Right eye findings:     Foreign body: not identified      Retinal contour: normal      Lens: normal      Vitreous body: anechoic      Right optic nerve sheath diameter:  normal (less than or equal to 5 mm)      Right optic nerve sheath diameter (mm):  4  Interpretation:     Ocular US impressions: normal                     Eileen Patino MD  05/12/25 1938

## 2025-05-12 NOTE — ASSESSMENT & PLAN NOTE
On chemotherapy, last chemotherapy was approximately 6-8 weeks back-carboplatin and paclitaxel  Follows up with Dr. Castro and radiation oncology  S/p radiation therapy from July to September, was on Keytruda which was stopped secondary to pneumonitis, on tapering dose of Decadron,   Has had stereotactic radiation to brain twice-in August 2024 in March 2025  Currently on 2 mg Decadron twice daily     First line metastatic treatment     Carboplatin AUC 5 Pemetrexed 500 mg/m2 and Pembrolizumab 200 mg IV every 3 weeks x 4 cycles     C1 held due to hospital admission  C1 held due to radiation and concurrent high-dose steroid use     C1 now 10/7/2024  C2 10/28/2024-held due to admission     C2 11/14/2024- completed      No treatment since 11/14/2024 due to multiple admissions/toxicities, PCP pneumonia, PE on Xarelto      Second Line metastatic single agent Taoxtere 75 mg/m2     C1 4/4/2025     C2 held 4/24/2025-will decrease to 60 mg/m2 due to toxicities     C2 will be given 5/5/2025 at 60 mg/m2 due to toxicities/potential tolerance     C2 deferred following hospital admission, not yet administered

## 2025-05-12 NOTE — ASSESSMENT & PLAN NOTE
2/2 to metastatic adenocarcinoma  Maintained on Decadron 2 mg BID PTA  Per neuro recs, Decadron 10 mg x 1 now, then 4 mg BID x 3 days, then 2 mg TID x 3 days, then 2 mg BID   Prior provider discussed with radiation oncology, recommend continue with Decadron taper, follow-up MRI outpatient, follow-up radiation oncology outpatient with Dr. Vuong.  During prior admission, no role for additional radiation 5/6/2025.

## 2025-05-12 NOTE — H&P
H&P - Hospitalist   Name: Ayla Nye 74 y.o. female I MRN: [id number]  Unit/Bed#: S -[facility] I Date of Admission: 5/12/2025   Date of Service: 5/12/2025 I Hospital Day: 0     Assessment & Plan  Stroke-like symptoms  CC: Fall at home on aspirin.  Recent admission on 5/5 and discharged on 5/7 for SLS and found to have infarct in R MCA territory.  Continue with aspirin and statin, Xarelto discontinued in April 2025 after new foci of hemorrhage within the left occipital metastasis.  Vasogenic brain edema, maintained on dexamethasone.  CT CAP with contrast:  Evidence of disease progression with several new and enlarging lesions, new lesions in right lower lobe, right thigh intramuscular mass (R thigh mass contrast enhancing, possible hematoma however not superficial on imaging).  Redemonstrated heterogeneous right renal mass A/W urothelial enhancement, concerning for metastatic lesion with probable involvement of the collecting system.  CTA head and neck:  Redemonstrated vasogenic edema left parietal vertex.  No hemorrhage identified.  No significant carotid or vertebral artery stenosis, no focal intracranial stenosis or aneurysm.  Positive labs:  Negative labs: UA, troponin, CBC (baseline leukocytosis, ACD), CMP, LDL, A1c, glucose on admission.  Pending labs: Magnesium  Low concern for infectious or metabolic etiology, no acute traumatic injury noted on imaging.  Likely related to increasing burden of metastasis or recrudescence.    Plan:  Palliative care, neurology consult.  Consider oncology consult, patient stated that she was supposed to start chemotherapy this week.  MRI brain wo contrast.  Follow-up TSH.  PT/OT when appropriate.  Vasogenic brain edema (HCC)  2/2 to metastatic adenocarcinoma  Maintained on Decadron 2 mg BID PTA  Per neuro recs, Decadron 10 mg x 1 now, then 4 mg BID x 3 days, then 2 mg TID x 3 days, then 2 mg BID   Prior provider discussed with radiation oncology, recommend continue with  Decadron taper, follow-up MRI outpatient, follow-up radiation oncology outpatient with Dr. Vuong.  During prior admission, no role for additional radiation 5/6/2025.  Left-sided weakness  See under SLS.  HTN (hypertension)  Continue home amlodipine.  BP goal-normotensive.  IV labetalol as needed.  Mixed hyperlipidemia  Continue home atorvastatin.  Insomnia  Continue home melatonin.  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 Castro and radiation oncology  S/p radiation therapy from July to September, was on Keytruda which was stopped secondary to pneumonitis, on tapering dose of Decadron,   Has had stereotactic radiation to brain twice-in August 2024 in March 2025  Currently on 2 mg Decadron twice daily     First line metastatic treatment     Carboplatin AUC 5 Pemetrexed 500 mg/m2 and Pembrolizumab 200 mg IV every 3 weeks x 4 cycles     C1 held due to hospital admission  C1 held due to radiation and concurrent high-dose steroid use     C1 now 10/7/2024  C2 10/28/2024-held due to admission     C2 11/14/2024- completed      No treatment since 11/14/2024 due to multiple admissions/toxicities, PCP pneumonia, PE on Xarelto      Second Line metastatic single agent Taoxtere 75 mg/m2     C1 4/4/2025     C2 held 4/24/2025-will decrease to 60 mg/m2 due to toxicities     C2 will be given 5/5/2025 at 60 mg/m2 due to toxicities/potential tolerance     C2 deferred following hospital admission, not yet administered  Hypothyroidism  Continue home levothyroxine 50 mcg daily.  Cancer associated pain  Continue home gabapentin, hydromorphone 2 mg every 4 hours as needed, acetaminophen 975 mg.  Patient states that her pain is well controlled on this regimen.  Palliative care patient  Follows with ZULEYMA Richards.  GERD (gastroesophageal reflux disease)  Continue home pantoprazole.  History of  stroke  R frontal subacute infarct, noted on imaging on 04/21/25  C/w aspirin & statin.  Anemia  Recent Labs     05/12/25  1423   HGB 10.7*     Anemia of chronic disease.  Hemoglobin baseline 9-10.  Continue to monitor.  History of pulmonary embolism  Previously on Xarelto, which was d/c'd after a new foci of hemorrhage w/in left occipital metastasis during prior admission.  Aspirin daily.      VTE Pharmacologic Prophylaxis: VTE Score: 4 Moderate Risk (Score 3-4) - Pharmacological DVT Prophylaxis Contraindicated. Sequential Compression Devices Ordered.  Code Status: Level 3 - DNAR and DNI  Discussion with family: Updated  () at bedside.    Anticipated Length of Stay: Patient will be admitted on an inpatient basis with an anticipated length of stay of greater than 2 midnights secondary to new metastases, SLS.    History of Present Illness   Chief Complaint: Fall    Ayla Nye is a 74 y.o. female with a PMH of stage IV adenocarcinoma on chemotherapy, with metastasis to brain status post radiation, prior recent admission for LUE weakness found to have R MCA frontal lobe stroke, hypertension, history of PE, hypothyroidism, PCP pneumonia, neuropathy, hypertension essential thrombocytosis who presents with fall.    Lost balance while throwing something away. Denies any prodromal syndrome. Hit back of head. No LOC. No lacerations, blood. Unable to get up, but baseline requires assistance to get up from bed due to weakness. On the ground for about 15 minutes.     Also worsening LUE and LLE weaknes, even after prior discharge. She has baseline 3/5 left upper and lower extremity weakness from metastatic brain lesion.    Supposed to have chemo last week, was supposed to get chemotherapy after last discharge, cannot have anymore radiation therapy. However was told to come here after fall.    Review of Systems   All other systems reviewed and are negative.      Historical Information   Past Medical  History:   Diagnosis Date    DILLON (acute kidney injury) (HCC) 02/14/2025    Allergic 2022    Allergic to neomiacin and cephalexin    Cancer (HCC)     lung cancer    Cancer (HCC)     mets to brain    Cancer (HCC)     perianal mass    Cataract Nov. 2023    Due to have durgery June 2024    Essential thrombocytosis (HCC)     controlled with medication    History of transfusion     Hyperlipidemia 2015    Hypertension 2011    Mass in chest     Nodular goiter     Osteoporosis     Peripheral neuropathy     Pneumonia Oct 16, 2023    Also  Feb 2024    Psoriasis     SIRS (systemic inflammatory response syndrome) (HCC) 06/22/2024     Past Surgical History:   Procedure Laterality Date    APPENDECTOMY  1954    BREAST CYST EXCISION Right     COLONOSCOPY  10/31/2018    DXA PROCEDURE (HISTORICAL)  04/21/2017    IR BIOPSY OTHER  09/12/2024    IR LUMBAR PUNCTURE  09/12/2024    MAMMO (HISTORICAL)      8-24-18    MAMMO NEEDLE LOCALIZATION RIGHT (ALL INC) Right 07/13/2009    SKIN LESION EXCISION      bridge of nose     Social History     Tobacco Use    Smoking status: Former     Current packs/day: 1.00     Average packs/day: 1 pack/day for 59.4 years (59.4 ttl pk-yrs)     Types: Cigarettes     Start date: 1966     Passive exposure: Past    Smokeless tobacco: Never    Tobacco comments:     Quit 2010   Vaping Use    Vaping status: Never Used   Substance and Sexual Activity    Alcohol use: Not Currently     Alcohol/week: 5.0 standard drinks of alcohol     Types: 5 Glasses of wine per week    Drug use: Never    Sexual activity: Not Currently     Partners: Male     Birth control/protection: Post-menopausal     E-Cigarette/Vaping    E-Cigarette Use Never User      E-Cigarette/Vaping Substances    Nicotine No     THC No     CBD No     Flavoring No     Other No     Unknown No      Social History:  Marital Status:    Occupation: Non-contributory  Patient Pre-hospital Living Situation: Home  Patient Pre-hospital Level of Mobility:  Patient  Pre-hospital Diet Restrictions:     Meds/Allergies   I have reviewed home medications with patient personally.  Prior to Admission medications    Medication Sig Start Date End Date Taking? Authorizing Provider   aluminum-magnesium hydroxide-simethicone (MAALOX) 8312-0011-123 mg/30 mL suspension Take 30 mL by mouth every 4 (four) hours as needed for indigestion or heartburn 4/25/25 5/25/25 Yes Jaci Chu MD   amLODIPine (NORVASC) 5 mg tablet TAKE 1 TABLET(5 MG) BY MOUTH DAILY 1/20/25  Yes Rafy Angelo MD   aspirin 81 mg chewable tablet Chew 1 tablet (81 mg total) daily for 29 doses 4/25/25 5/24/25 Yes Jaci Chu MD   atorvastatin (LIPITOR) 40 mg tablet Take 1 tablet (40 mg total) by mouth daily with dinner 4/25/25  Yes Jaci Chu MD   dexamethasone (DECADRON) 2 mg tablet Take 2 tablets (4 mg total) by mouth every 12 (twelve) hours for 3 days, THEN 1 tablet (2 mg total) every 8 (eight) hours. 5/7/25 6/9/25 Yes Shazia Hylton PA-C   gabapentin (NEURONTIN) 100 mg capsule TAKE 1 CAPSULE BY MOUTH EVERY MORNING, 1 CAPSULE EVERY AFTERNOON AND 3 CAPSULES EVERY NIGHT AT BEDTIME  Patient taking differently: TAKE 2 CAPSULES BY MOUTH EVERY MORNING AND 3 CAPSULES EVERY NIGHT AT BEDTIME (patient did not change her regimen per doctor) 1/13/25  Yes ZULEYMA Pollard   HYDROmorphone (DILAUDID) 2 mg tablet Take 1 tablet (2 mg total) by mouth every 4 (four) hours as needed for moderate pain Max Daily Amount: 12 mg 3/19/25  Yes ZULEYMA Pollard   levETIRAcetam (KEPPRA) 1000 MG tablet TAKE 1 TABLET(1000 MG) BY MOUTH EVERY 12 HOURS 3/27/25  Yes Rafy Angelo MD   levothyroxine 50 mcg tablet TAKE 1 TABLET(50 MCG) BY MOUTH DAILY EARLY MORNING 4/2/25  Yes Rafy Angelo MD   melatonin 3 mg Take 1 tablet (3 mg total) by mouth daily at bedtime 5/7/25  Yes Shazia Hylton PA-C   pantoprazole (PROTONIX) 40 mg tablet Take 1 tablet (40 mg total) by mouth daily in the early morning 4/8/25 5/12/25 Yes Rafy Angelo MD    QUEtiapine (SEROquel) 25 mg tablet Take 0.5 tablets (12.5 mg total) by mouth daily at bedtime 5/7/25  Yes Shazia Hylton PA-C   senna (SENOKOT) 8.6 MG tablet Take 1 tablet by mouth daily Takes one every other day   Yes Historical Provider, MD   sulfamethoxazole-trimethoprim (BACTRIM DS) 800-160 mg per tablet Take 1 tablet by mouth 3 (three) times a week Starting 12/24, you can take one tablet Monday, Wednesday, and Fridays. 1/29/25 7/28/25 Yes Destiny Solo MD   vitamin B-12 (VITAMIN B-12) 1,000 mcg tablet Take 1 tablet (1,000 mcg total) by mouth daily 9/14/23  Yes ZULEYMA Boland   ondansetron (ZOFRAN) 4 mg tablet Take 1 tablet (4 mg total) by mouth every 8 (eight) hours as needed for nausea or vomiting 2/19/25   Nimco Patel,      Allergies   Allergen Reactions    Doxycycline Headache    Keflex [Cephalexin] Rash    Neomycin-Polymyxin-Dexameth Eye Swelling       Objective :  Temp:  [98.4 °F (36.9 °C)] 98.4 °F (36.9 °C)  HR:  [80-98] 82  BP: (135-172)/() 168/103  Resp:  [16-20] 18  SpO2:  [95 %-97 %] 95 %  O2 Device: None (Room air)    Physical Exam  Vitals and nursing note reviewed.   Constitutional:       General: She is not in acute distress.     Appearance: She is well-developed.   HENT:      Head: Normocephalic and atraumatic.   Eyes:      Conjunctiva/sclera: Conjunctivae normal.   Cardiovascular:      Rate and Rhythm: Normal rate and regular rhythm.      Heart sounds: No murmur heard.  Pulmonary:      Effort: Pulmonary effort is normal. No respiratory distress.      Breath sounds: Normal breath sounds.   Abdominal:      Palpations: Abdomen is soft.      Tenderness: There is no abdominal tenderness.   Musculoskeletal:         General: No swelling.      Cervical back: Neck supple.   Skin:     General: Skin is warm and dry.      Capillary Refill: Capillary refill takes less than 2 seconds.   Neurological:      Mental Status: She is alert.      Comments: Dizziness, left-sided weakness.  1/5 strength LLE/LUE. 4/5 strength RLE/RUE.   Psychiatric:         Mood and Affect: Mood normal.         Lines/Drains:            Lab Results: I have reviewed the following results:  Results from last 7 days   Lab Units 05/12/25  1423   WBC Thousand/uL 14.41*   HEMOGLOBIN g/dL 10.7*   HEMATOCRIT % 35.9   PLATELETS Thousands/uL 386   LYMPHO PCT % 1*   MONO PCT % 5   EOS PCT % 0     Results from last 7 days   Lab Units 05/12/25  1423   SODIUM mmol/L 141   POTASSIUM mmol/L 4.3   CHLORIDE mmol/L 104   CO2 mmol/L 27   BUN mg/dL 23   CREATININE mg/dL 1.00   ANION GAP mmol/L 10   CALCIUM mg/dL 9.3   ALBUMIN g/dL 4.1   TOTAL BILIRUBIN mg/dL 0.21   ALK PHOS U/L 57   ALT U/L 10   AST U/L 9*   GLUCOSE RANDOM mg/dL 177*         Results from last 7 days   Lab Units 05/12/25  1400   POC GLUCOSE mg/dl 180*     Lab Results   Component Value Date    HGBA1C 5.6 05/06/2025    HGBA1C 5.6 04/23/2025    HGBA1C 5.3 07/30/2024           CT chest abdomen pelvis w contrast  Result Date: 5/12/2025  Impression: No findings of acute traumatic injury in the chest, abdomen or pelvis. History of metastatic lung cancer with evidence for disease progression with several new and enlarging lesions as described above. Redemonstrated heterogeneous right renal mass associated with urothelial enhancement is concerning for metastatic lesion with probable involvement of the collecting system. The study was marked in EPIC for immediate notification. Resident: Damaso Briscoe I, the attending radiologist, have reviewed the images and agree with the final report above. Workstation performed: [id number]     CTA head and neck w wo contrast  Result Date: 5/12/2025  Impression: CT Brain: Vasogenic edema left parietal vertex compatible with known brain metastasis. No hemorrhage identified. CT Angiography: No significant carotid or vertebral artery stenosis. No focal intracranial stenosis or aneurysm. Workstation performed: [id number]       No Chest XR results  available for this patient.     Other Study Results Review: EKG was reviewed.     Administrative Statements     ** Please Note: This note has been constructed using a voice recognition system. **

## 2025-05-12 NOTE — TELEPHONE ENCOUNTER
Call received from patient. She is asking if her appointment today can be switched to a virtual. She is feeling tired and weak and is not able to come in to the office.

## 2025-05-12 NOTE — ASSESSMENT & PLAN NOTE
Recent Labs     05/12/25  1423   HGB 10.7*     Anemia of chronic disease.  Hemoglobin baseline 9-10.  Continue to monitor.

## 2025-05-12 NOTE — ED PROVIDER NOTES
Emergency Department Trauma Note  Ayla Nye 74 y.o. female MRN: [id number]  Unit/Bed#: S /S -01 Encounter: [id number]      Trauma Alert: Trauma Acuity: C  Model of Arrival:   via    Trauma Team: Current Providers  Attending Provider: Itzel Zambrano MD  Attending Provider: Sarah Lam MD  ED Technician: Taylor Mary  Resident: Judit Jiang MD  Registered Nurse: Vicki Soto RN  Registered Nurse: Renata Cortes RN  Resident: Greg Jarrett MD  Patient Care Assistant: Amairani Souza  Consultants:     None      History of Present Illness     Chief Complaint:   Chief Complaint   Patient presents with    Neurologic Problem     Fall yesterday +HS on wall. +baby aspirin. Notes some weakness last night. Awoke this morning with marked L sided weakness.     Fall     HPI:  Ayla Nye is a 74 y.o. female who presents with fall on aspirin.  Mechanism:Details of Incident: fall, hit head against wall, takes aspirin, L sided weakness, last known well unknown          HPI  Patient is a 74-year-old female with a history of lung cancer with brain mets presenting after a fall onset yesterday morning.  Patient reports she was in the kitchen when she slipped, landing on her bottom, and hitting her head on the wall.  Denies LOC, is on aspirin.  Patient reports left-sided weakness has been constant, but worse upon waking this morning.  Last known well at 9 PM.  Denies neck pain, chest pain, shortness of breath, fevers, or any other symptoms at this time.    Review of Systems  As per HPI    Historical Information     Immunizations:   Immunization History   Administered Date(s) Administered    COVID-19 PFIZER VACCINE 0.3 ML IM 02/17/2021, 03/09/2021, 11/15/2021, 11/27/2021    INFLUENZA 10/10/2014, 10/14/2015, 10/26/2017, 10/08/2018, 10/17/2019, 10/12/2021    Influenza Split High Dose Preservative Free IM 09/24/2024    Influenza, high dose seasonal 0.7 mL 10/12/2022, 09/27/2023    Pneumococcal  Conjugate 13-Valent 2017    Pneumococcal Conjugate Vaccine 20-valent (Pcv20), Polysace 2024    Pneumococcal Polysaccharide PPV23 2018    Td (adult), adsorbed 2015, 10/27/2021    Zoster 2014       Past Medical History:   Diagnosis Date    DILLON (acute kidney injury) (HCC) 2025    Allergic     Allergic to neomiacin and cephalexin    Cancer (HCC)     lung cancer    Cancer (HCC)     mets to brain    Cancer (HCC)     perianal mass    Cataract 2023    Due to have durgery 2024    Essential thrombocytosis (HCC)     controlled with medication    History of transfusion     Hyperlipidemia     Hypertension     Mass in chest     Nodular goiter     Osteoporosis     Peripheral neuropathy     Pneumonia Oct 16, 2023    Also  2024    Psoriasis     SIRS (systemic inflammatory response syndrome) (HCC) 2024       Family History   Problem Relation Age of Onset    Stroke Mother     Depression Mother             Hypertension Mother     Hearing loss Mother     Tuberculosis Father     Cancer Brother         lung    Tuberculosis Brother     Coronary artery disease Brother     Hypertension Brother     Heart disease Brother     No Known Problems Daughter     No Known Problems Daughter     No Known Problems Maternal Grandmother     No Known Problems Maternal Grandfather     No Known Problems Paternal Grandmother     No Known Problems Paternal Grandfather     No Known Problems Maternal Aunt     No Known Problems Maternal Aunt     No Known Problems Maternal Aunt     No Known Problems Maternal Aunt     No Known Problems Paternal Aunt     Cancer Brother         Throat cancer     Past Surgical History:   Procedure Laterality Date    APPENDECTOMY      BREAST CYST EXCISION Right     COLONOSCOPY  10/31/2018    DXA PROCEDURE (HISTORICAL)  2017    IR BIOPSY OTHER  2024    IR LUMBAR PUNCTURE  2024    MAMMO (HISTORICAL)      18    MAMMO NEEDLE LOCALIZATION  RIGHT (ALL INC) Right 07/13/2009    SKIN LESION EXCISION      bridge of nose     Social History     Tobacco Use    Smoking status: Former     Current packs/day: 1.00     Average packs/day: 1 pack/day for 59.4 years (59.4 ttl pk-yrs)     Types: Cigarettes     Start date: 1966     Passive exposure: Past    Smokeless tobacco: Never    Tobacco comments:     Quit 2010   Vaping Use    Vaping status: Never Used   Substance Use Topics    Alcohol use: Not Currently     Alcohol/week: 5.0 standard drinks of alcohol     Types: 5 Glasses of wine per week    Drug use: Never     E-Cigarette/Vaping    E-Cigarette Use Never User      E-Cigarette/Vaping Substances    Nicotine No     THC No     CBD No     Flavoring No     Other No     Unknown No        Family History: non-contributory    Meds/Allergies   Prior to Admission Medications   Prescriptions Last Dose Informant Patient Reported? Taking?   HYDROmorphone (DILAUDID) 2 mg tablet 5/11/2025 Bedtime  No Yes   Sig: Take 1 tablet (2 mg total) by mouth every 4 (four) hours as needed for moderate pain Max Daily Amount: 12 mg   QUEtiapine (SEROquel) 25 mg tablet 5/11/2025 Bedtime  No Yes   Sig: Take 0.5 tablets (12.5 mg total) by mouth daily at bedtime   aluminum-magnesium hydroxide-simethicone (MAALOX) 8206-6039-144 mg/30 mL suspension 5/12/2025 Morning  No Yes   Sig: Take 30 mL by mouth every 4 (four) hours as needed for indigestion or heartburn   amLODIPine (NORVASC) 5 mg tablet 5/12/2025  No Yes   Sig: TAKE 1 TABLET(5 MG) BY MOUTH DAILY   aspirin 81 mg chewable tablet 5/12/2025 Morning  No Yes   Sig: Chew 1 tablet (81 mg total) daily for 29 doses   atorvastatin (LIPITOR) 40 mg tablet 5/11/2025 Evening  No Yes   Sig: Take 1 tablet (40 mg total) by mouth daily with dinner   dexamethasone (DECADRON) 2 mg tablet 5/12/2025 Morning  No Yes   Sig: Take 2 tablets (4 mg total) by mouth every 12 (twelve) hours for 3 days, THEN 1 tablet (2 mg total) every 8 (eight) hours.   gabapentin  (NEURONTIN) 100 mg capsule 5/12/2025  No Yes   Sig: TAKE 1 CAPSULE BY MOUTH EVERY MORNING, 1 CAPSULE EVERY AFTERNOON AND 3 CAPSULES EVERY NIGHT AT BEDTIME   Patient taking differently: TAKE 2 CAPSULES BY MOUTH EVERY MORNING AND 3 CAPSULES EVERY NIGHT AT BEDTIME (patient did not change her regimen per doctor)   levETIRAcetam (KEPPRA) 1000 MG tablet 5/12/2025 Morning  No Yes   Sig: TAKE 1 TABLET(1000 MG) BY MOUTH EVERY 12 HOURS   levothyroxine 50 mcg tablet 5/12/2025 Morning  No Yes   Sig: TAKE 1 TABLET(50 MCG) BY MOUTH DAILY EARLY MORNING   melatonin 3 mg 5/11/2025 Bedtime  No Yes   Sig: Take 1 tablet (3 mg total) by mouth daily at bedtime   ondansetron (ZOFRAN) 4 mg tablet More than a month  No No   Sig: Take 1 tablet (4 mg total) by mouth every 8 (eight) hours as needed for nausea or vomiting   pantoprazole (PROTONIX) 40 mg tablet 5/12/2025 Morning  No Yes   Sig: Take 1 tablet (40 mg total) by mouth daily in the early morning   senna (SENOKOT) 8.6 MG tablet 5/10/2025 Self Yes Yes   Sig: Take 1 tablet by mouth daily Takes one every other day   sulfamethoxazole-trimethoprim (BACTRIM DS) 800-160 mg per tablet 5/12/2025 Morning  No Yes   Sig: Take 1 tablet by mouth 3 (three) times a week Starting 12/24, you can take one tablet Monday, Wednesday, and Fridays.   vitamin B-12 (VITAMIN B-12) 1,000 mcg tablet 5/12/2025 Morning Self No Yes   Sig: Take 1 tablet (1,000 mcg total) by mouth daily      Facility-Administered Medications: None       Allergies   Allergen Reactions    Doxycycline Headache    Keflex [Cephalexin] Rash    Neomycin-Polymyxin-Dexameth Eye Swelling       PHYSICAL EXAM    PE limited by: none    Objective   Vitals:   First set: Temperature: 98.4 °F (36.9 °C) (05/12/25 1511)  Pulse: 98 (05/12/25 1358)  Respirations: 18 (05/12/25 1358)  Blood Pressure: (!) 172/103 (05/12/25 1358)  SpO2: 95 % (05/12/25 1358)    Primary Survey:   (A) Airway: intact  (B) Breathing: bilateral breath sounds  (C) Circulation:  Pulses:   radial  2/4  (D) Disabliity:  GCS Total:  15  (E) Expose:  Completed    Secondary Survey: (Click on Physical Exam tab above)  Physical Exam  Vitals and nursing note reviewed.   Constitutional:       Appearance: She is not toxic-appearing or diaphoretic.   HENT:      Head: Normocephalic and atraumatic.      Right Ear: External ear normal.      Left Ear: External ear normal.      Nose: Nose normal.      Mouth/Throat:      Mouth: Mucous membranes are moist.   Eyes:      General: No scleral icterus.     Extraocular Movements: Extraocular movements intact.      Conjunctiva/sclera: Conjunctivae normal.   Cardiovascular:      Rate and Rhythm: Normal rate and regular rhythm.      Pulses: Normal pulses.           Radial pulses are 2+ on the right side.        Dorsalis pedis pulses are 2+ on the right side and 2+ on the left side.      Heart sounds: Normal heart sounds, S1 normal and S2 normal. No murmur heard.  Pulmonary:      Effort: Pulmonary effort is normal. No respiratory distress.      Breath sounds: Normal breath sounds. No stridor. No wheezing.   Abdominal:      Palpations: Abdomen is soft.      Tenderness: There is no abdominal tenderness.   Musculoskeletal:         General: Normal range of motion.      Cervical back: Normal range of motion.   Skin:     General: Skin is warm and dry.      Capillary Refill: Capillary refill takes less than 2 seconds.   Neurological:      General: No focal deficit present.      Mental Status: She is alert and oriented to person, place, and time.      Comments: No visual field deficits, denies changes in visual acuity  Pupils PERRLA, EOM intact  Sensation intact and equal in upper, middle, and lower face  Able to open mouth fully, no TMJ tenderness, able to puff out cheeks, able keep eyes closed through tension  No facial droop or dysarthria  Able to hear finger rub equally b/l  Able to stick out tongue and move in both directions  No uvular deviation  Able to turn head and  shrug shoulders against resistance.  Finger to nose and heel to shin normal. Gait not assessed.    5/5 strength LUE and LLE, 4/5 in RUE and RLE     Psychiatric:         Mood and Affect: Mood normal.         Cervical spine cleared by clinical criteria? No (imaging required)      Invasive Devices       Peripheral Intravenous Line  Duration             Peripheral IV 05/12/25 Right Antecubital <1 day                    Lab Results:   Results Reviewed       Procedure Component Value Units Date/Time    HS Troponin 0hr (reflex protocol) [[id number]]  (Normal) Collected: 05/12/25 1423    Lab Status: Final result Specimen: Blood from Arm, Right Updated: 05/12/25 1719     hs TnI 0hr 6 ng/L     HS Troponin I 2hr [[id number]]     Lab Status: No result Specimen: Blood     HS Troponin I 4hr [[id number]]     Lab Status: No result Specimen: Blood     RBC Morphology Reflex Test [[id number]] Collected: 05/12/25 1423    Lab Status: Final result Specimen: Blood from Arm, Right Updated: 05/12/25 1602    CBC and differential [[id number]]  (Abnormal) Collected: 05/12/25 1423    Lab Status: Final result Specimen: Blood from Arm, Right Updated: 05/12/25 1544     WBC 14.41 Thousand/uL      RBC 3.84 Million/uL      Hemoglobin 10.7 g/dL      Hematocrit 35.9 %      MCV 94 fL      MCH 27.9 pg      MCHC 29.8 g/dL      RDW 16.8 %      MPV 9.6 fL      Platelets 386 Thousands/uL     Narrative:      This is an appended report.  These results have been appended to a previously verified report.    Manual Differential(PHLEBS Do Not Order) [[id number]]  (Abnormal) Collected: 05/12/25 1423    Lab Status: Final result Specimen: Blood from Arm, Right Updated: 05/12/25 1544     Segmented % 91 %      Lymphocytes % 1 %      Monocytes % 5 %      Eosinophils % 0 %      Basophils % 0 %      Metamyelocytes % 1 %      Myelocytes % 2 %      Absolute Neutrophils 13.11 Thousand/uL      Absolute Lymphocytes 0.14 Thousand/uL      Absolute Monocytes 0.72 Thousand/uL       Absolute Eosinophils 0.00 Thousand/uL      Absolute Basophils 0.00 Thousand/uL      Absolute Metamyelocytes 0.14 Thousand/uL      Absolute Myelocytes 0.29 Thousand/uL      Total Counted --     RBC Morphology Present     Platelet Estimate Increased     Large Platelet Present     Anisocytosis Present     Ovalocytes Present     Poikilocytes Present     Tear Drop Cells Present    Magnesium [[id number]] Collected: 05/12/25 1423    Lab Status: In process Specimen: Blood from Arm, Right Updated: 05/12/25 1518    Urine Microscopic [[id number]]  (Abnormal) Collected: 05/12/25 1457    Lab Status: Final result Specimen: Urine, Clean Catch Updated: 05/12/25 1516     RBC, UA 4-10 /hpf      WBC, UA 1-2 /hpf      Epithelial Cells Occasional /hpf      Bacteria, UA None Seen /hpf     Comprehensive metabolic panel [[id number]]  (Abnormal) Collected: 05/12/25 1423    Lab Status: Final result Specimen: Blood from Arm, Right Updated: 05/12/25 1513     Sodium 141 mmol/L      Potassium 4.3 mmol/L      Chloride 104 mmol/L      CO2 27 mmol/L      ANION GAP 10 mmol/L      BUN 23 mg/dL      Creatinine 1.00 mg/dL      Glucose 177 mg/dL      Calcium 9.3 mg/dL      AST 9 U/L      ALT 10 U/L      Alkaline Phosphatase 57 U/L      Total Protein 7.1 g/dL      Albumin 4.1 g/dL      Total Bilirubin 0.21 mg/dL      eGFR 55 ml/min/1.73sq m     Narrative:      National Kid[employer] guidelines for Chronic Kidney Disease (CKD):     Stage 1 with normal or high GFR (GFR > 90 mL/min/1.73 square meters)    Stage 2 Mild CKD (GFR = 60-89 mL/min/1.73 square meters)    Stage 3A Moderate CKD (GFR = 45-59 mL/min/1.73 square meters)    Stage 3B Moderate CKD (GFR = 30-44 mL/min/1.73 square meters)    Stage 4 Severe CKD (GFR = 15-29 mL/min/1.73 square meters)    Stage 5 End Stage CKD (GFR <15 mL/min/1.73 square meters)  Note: GFR calculation is accurate only with a steady state creatinine    UA w Reflex to Microscopic w Reflex to Culture [[id number]]   (Abnormal) Collected: 05/12/25 1457    Lab Status: Final result Specimen: Urine, Clean Catch Updated: 05/12/25 1511     Color, UA Light Yellow     Clarity, UA Clear     Specific Gravity, UA 1.048     pH, UA 6.5     Leukocytes, UA Negative     Nitrite, UA Negative     Protein, UA Trace mg/dl      Glucose, UA Negative mg/dl      Ketones, UA Negative mg/dl      Urobilinogen, UA <2.0 mg/dl      Bilirubin, UA Negative     Occult Blood, UA Negative    Fingerstick Glucose (POCT) [[id number]]  (Abnormal) Collected: 05/12/25 1400    Lab Status: Final result Specimen: Blood Updated: 05/12/25 1401     POC Glucose 180 mg/dl                    Imaging Studies:   Direct to CT: Yes  CTA head and neck w wo contrast   Final Result by Jhonny Rodriguez DO (05/12 1456)      CT Brain: Vasogenic edema left parietal vertex compatible with known brain metastasis. No hemorrhage identified.      CT Angiography: No significant carotid or vertebral artery stenosis. No focal intracranial stenosis or aneurysm.               Workstation performed: [id number]         CT chest abdomen pelvis w contrast   Final Result by Chani Zarate MD (05/12 0656)      No findings of acute traumatic injury in the chest, abdomen or pelvis.      History of metastatic lung cancer with evidence for disease progression with several new and enlarging lesions as described above.      Redemonstrated heterogeneous right renal mass associated with urothelial enhancement is concerning for metastatic lesion with probable involvement of the collecting system.      The study was marked in EPIC for immediate notification.         Resident: Damaso Briscoe I, the attending radiologist, have reviewed the images and agree with the final report above.      Workstation performed: [id number]               Procedures  Procedures         ED Course  ED Course as of 05/12/25 1725   Mon May 12, 2025   1417 Per radiology tech, CTA head/neck also covers c-spine   1424 Procedure Note:  EKG  Date/Time: 05/12/25 2:24 PM   Interpreted by: Judit Jiang MD  Indications / Diagnosis: fall  ECG reviewed by me, the ED Physician: yes   The EKG demonstrates:  Rhythm: normal sinus  Intervals: normal intervals  Axis: normal axis  QRS/Blocks: normal QRS  ST Changes: No acute ST Changes, no STD/HARRY.     1507 Differential diagnosis including but not limited to: sprain, strain, fracture, dislocation, contusion, CVA   1508 WBC(!): 14.41  Elevated due to decadron.   1635 Patient will need admission for further management of her left-sided weakness, long metastasis with interval worsening and known metastasis to the brain with cerebral edema.  No acute traumatic injury, so no need for trauma evaluation.  Will reach out to [facility] for admission for stroke pathway and further management of her medical issues.    Discussed results with patient and need for admission. Patient voices understanding and agrees with plan. All questions were addressed. No other concerns at this time.    Discussed patient's case with Dr. Lam ([facility]) regarding admission who accepted the patient for further evaluation and management.             Medical Decision Making  Amount and/or Complexity of Data Reviewed  Labs: ordered. Decision-making details documented in ED Course.  Radiology: ordered.    Risk  Prescription drug management.  Decision regarding hospitalization.    See ED course for MDM.              Disposition  [facility] Transfer: No  Final diagnoses:   Stroke-like symptoms   Left-sided weakness   Lung cancer (HCC)   Metastasis to brain (HCC)   Cerebral edema (HCC)   Elevated blood pressure reading     Time reflects when diagnosis was documented in both MDM as applicable and the Disposition within this note       Time User Action Codes Description Comment    5/12/2025  3:07 PM Itzel Zambrano Add [R29.90] Stroke-like symptoms     5/12/2025  4:32 PM Judit Jiang [R53.1] Left-sided weakness     5/12/2025  4:32 PM Judit Jiang  [C34.90] Lung cancer (HCC)     5/12/2025  4:32 PM Kim Jiange Add [C79.31] Metastasis to brain (HCC)     5/12/2025  4:33 PM Apolinar Judit Add [G93.6] Cerebral edema (HCC)     5/12/2025  4:33 PM ApolinarCodyJudit Add [R03.0] Elevated blood pressure reading           ED Disposition       ED Disposition   Admit    Condition   Stable    Date/Time   Mon May 12, 2025  4:32 PM    Comment   Case was discussed with SKINNY and the patient's admission status was agreed to be Admission Status: observation status to the service of Dr. Lam .               Follow-up Information    None       Current Discharge Medication List        CONTINUE these medications which have NOT CHANGED    Details   aluminum-magnesium hydroxide-simethicone (MAALOX) 3610-8798-694 mg/30 mL suspension Take 30 mL by mouth every 4 (four) hours as needed for indigestion or heartburn  Qty: 355 mL, Refills: 0    Associated Diagnoses: Gastroesophageal reflux disease without esophagitis      amLODIPine (NORVASC) 5 mg tablet TAKE 1 TABLET(5 MG) BY MOUTH DAILY  Qty: 30 tablet, Refills: 5    Associated Diagnoses: Primary hypertension      aspirin 81 mg chewable tablet Chew 1 tablet (81 mg total) daily for 29 doses  Qty: 29 tablet, Refills: 0    Associated Diagnoses: Left-sided weakness      atorvastatin (LIPITOR) 40 mg tablet Take 1 tablet (40 mg total) by mouth daily with dinner  Qty: 30 tablet, Refills: 0    Associated Diagnoses: Left-sided weakness      dexamethasone (DECADRON) 2 mg tablet Take 2 tablets (4 mg total) by mouth every 12 (twelve) hours for 3 days, THEN 1 tablet (2 mg total) every 8 (eight) hours.  Qty: 102 tablet, Refills: 0    Associated Diagnoses: Vasogenic brain edema (HCC)      gabapentin (NEURONTIN) 100 mg capsule TAKE 1 CAPSULE BY MOUTH EVERY MORNING, 1 CAPSULE EVERY AFTERNOON AND 3 CAPSULES EVERY NIGHT AT BEDTIME  Qty: 150 capsule, Refills: 5    Associated Diagnoses: Palliative care patient; Cancer related pain      HYDROmorphone (DILAUDID) 2 mg  tablet Take 1 tablet (2 mg total) by mouth every 4 (four) hours as needed for moderate pain Max Daily Amount: 12 mg  Qty: 90 tablet, Refills: 0    Associated Diagnoses: Metastatic cancer to brain (HCC); Metastatic adenocarcinoma (HCC); 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); Palliative care patient; Cancer associated pain      levETIRAcetam (KEPPRA) 1000 MG tablet TAKE 1 TABLET(1000 MG) BY MOUTH EVERY 12 HOURS  Qty: 60 tablet, Refills: 5    Associated Diagnoses: Metastatic cancer to brain (HCC)      levothyroxine 50 mcg tablet TAKE 1 TABLET(50 MCG) BY MOUTH DAILY EARLY MORNING  Qty: 30 tablet, Refills: 5    Associated Diagnoses: Acquired hypothyroidism      melatonin 3 mg Take 1 tablet (3 mg total) by mouth daily at bedtime  Qty: 30 tablet, Refills: 0    Associated Diagnoses: Vasogenic brain edema (HCC)      pantoprazole (PROTONIX) 40 mg tablet Take 1 tablet (40 mg total) by mouth daily in the early morning  Qty: 30 tablet, Refills: 0    Associated Diagnoses: Metastatic cancer to brain (HCC)      QUEtiapine (SEROquel) 25 mg tablet Take 0.5 tablets (12.5 mg total) by mouth daily at bedtime  Qty: 15 tablet, Refills: 0    Associated Diagnoses: Vasogenic brain edema (HCC)      senna (SENOKOT) 8.6 MG tablet Take 1 tablet by mouth daily Takes one every other day      sulfamethoxazole-trimethoprim (BACTRIM DS) 800-160 mg per tablet Take 1 tablet by mouth 3 (three) times a week Starting 12/24, you can take one tablet Monday, Wednesday, and Fridays.  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    PDMP Review         Value Time User    PDMP Reviewed  Yes 3/19/2025  9:11 AM ZULEYMA Pollard            ED Provider  Electronically Signed by           Judit Jiang MD  05/12/25 9201

## 2025-05-12 NOTE — Clinical Note
Case was discussed with SKINNY and the patient's admission status was agreed to be Admission Status: observation status to the service of Dr. Zaragoza  
not applicable

## 2025-05-12 NOTE — TELEPHONE ENCOUNTER
Called patient and left a message on voicemail confirming home visit with Palliative care at 11:00 am tomorrow 5/13/25.  Contact information provided if patient needs to change appointment.

## 2025-05-12 NOTE — ED ATTENDING ATTESTATION
5/12/2025  IItzel MD, saw and evaluated the patient. I have discussed the patient with the resident/non-physician practitioner and agree with the resident's/non-physician practitioner's findings, Plan of Care, and MDM as documented in the resident's/non-physician practitioner's note, except where noted. All available labs and Radiology studies were reviewed.  I was present for key portions of any procedure(s) performed by the resident/non-physician practitioner and I was immediately available to provide assistance.       At this point I agree with the current assessment done in the Emergency Department.  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  Patient states that her weakness started yesterday before she went to sleep, and then when she woke up this morning she was feeling a little bit more weak than usual.  She denies any fever chills or sweats.  She denies any nausea vomiting abdominal pain.  She denies any chest pain pressure discomfort.  She denies any dysuria frequency hesitancy, or any other significantly associated symptoms.  On exam she has identifiable 3-5 weakness in the bilateral lower extremities, as well as 3 out of 5 weakness in the left upper extremity.  Right upper extremity 4 out of 5 strength.  Her exam otherwise was unremarkable.  She did not have any adventitious lung sounds.  Of note: She fell back yesterday, hit the wall, and is on aspirin.  Trauma C initiated upon being aware of this history.  Patient has had weakness previously, and her history is slightly unreliable, and for that reason the stroke alert was not called.  Her differential diagnosis includes: Intracranial bleed versus ischemic stroke  versus worsening weakness due to mets versus other.  Patient had a leukocytosis of 14,000.  She underwent a CTA of her head and neck showing no acute abnormalities.  No traumatic abnormalities, she does have vasogenic edema from her previously known mets, unchanged vasogenic edema.  CT chest abdomen pelvis showing worsening mets throughout the rest of her body, however no other acute abnormalities.  Labs otherwise unremarkable.  Patient was then requested to be hospitalized by the hospitalist provider excepted without any further orders requested.    Critical Care Time Statement: Upon my evaluation, this patient had a high probability of imminent or life-threatening deterioration due to trauma, which required my direct attention, intervention, and personal management.  I spent a total of 90 minutes directly providing critical care services, including interpretation of complex medical databases, evaluating for the presence of life-threatening injuries or illnesses, and complex medical decision making (to support/prevent further life-threatening deterioration).. This time is exclusive of procedures, teaching, treating other patients, family meetings, and any prior time recorded by providers other than myself.        ED Course  ED Course as of 05/12/25 1610   Mon May 12, 2025   1506 WBC(!): 14.41         Critical Care Time  Procedures

## 2025-05-12 NOTE — ASSESSMENT & PLAN NOTE
Previously on Xarelto, which was d/c'd after a new foci of hemorrhage w/in left occipital metastasis during prior admission.  Aspirin daily.

## 2025-05-13 ENCOUNTER — APPOINTMENT (INPATIENT)
Dept: MRI IMAGING | Facility: HOSPITAL | Age: 75
DRG: 180 | End: 2025-05-13
Payer: MEDICARE

## 2025-05-13 LAB
ANION GAP SERPL CALCULATED.3IONS-SCNC: 8 MMOL/L (ref 4–13)
ANISOCYTOSIS BLD QL SMEAR: PRESENT
BASOPHILS # BLD MANUAL: 0.15 THOUSAND/UL (ref 0–0.1)
BASOPHILS NFR MAR MANUAL: 1 % (ref 0–1)
BUN SERPL-MCNC: 24 MG/DL (ref 5–25)
CALCIUM SERPL-MCNC: 9.2 MG/DL (ref 8.4–10.2)
CHLORIDE SERPL-SCNC: 106 MMOL/L (ref 96–108)
CO2 SERPL-SCNC: 25 MMOL/L (ref 21–32)
CREAT SERPL-MCNC: 0.88 MG/DL (ref 0.6–1.3)
EOSINOPHIL # BLD MANUAL: 0 THOUSAND/UL (ref 0–0.4)
EOSINOPHIL NFR BLD MANUAL: 0 % (ref 0–6)
ERYTHROCYTE [DISTWIDTH] IN BLOOD BY AUTOMATED COUNT: 16.7 % (ref 11.6–15.1)
GFR SERPL CREATININE-BSD FRML MDRD: 64 ML/MIN/1.73SQ M
GLUCOSE SERPL-MCNC: 112 MG/DL (ref 65–140)
GLUCOSE SERPL-MCNC: 157 MG/DL (ref 65–140)
GLUCOSE SERPL-MCNC: 177 MG/DL (ref 65–140)
HCT VFR BLD AUTO: 31.7 % (ref 34.8–46.1)
HGB BLD-MCNC: 9.7 G/DL (ref 11.5–15.4)
LYMPHOCYTES # BLD AUTO: 0.91 THOUSAND/UL (ref 0.6–4.47)
LYMPHOCYTES # BLD AUTO: 6 % (ref 14–44)
MCH RBC QN AUTO: 28.6 PG (ref 26.8–34.3)
MCHC RBC AUTO-ENTMCNC: 30.6 G/DL (ref 31.4–37.4)
MCV RBC AUTO: 94 FL (ref 82–98)
METAMYELOCYTE ABSOLUTE CT: 0.61 THOUSAND/UL (ref 0–0.1)
METAMYELOCYTES NFR BLD MANUAL: 4 % (ref 0–1)
MONOCYTES # BLD AUTO: 0.61 THOUSAND/UL (ref 0–1.22)
MONOCYTES NFR BLD: 4 % (ref 4–12)
MYELOCYTE ABSOLUTE CT: 0.15 THOUSAND/UL (ref 0–0.1)
MYELOCYTES NFR BLD MANUAL: 1 % (ref 0–1)
NEUTROPHILS # BLD MANUAL: 12.71 THOUSAND/UL (ref 1.85–7.62)
NEUTS SEG NFR BLD AUTO: 84 % (ref 43–75)
NRBC BLD AUTO-RTO: 1 /100 WBC (ref 0–2)
PLATELET # BLD AUTO: 322 THOUSANDS/UL (ref 149–390)
PLATELET BLD QL SMEAR: ADEQUATE
PMV BLD AUTO: 9.5 FL (ref 8.9–12.7)
POLYCHROMASIA BLD QL SMEAR: PRESENT
POTASSIUM SERPL-SCNC: 4.5 MMOL/L (ref 3.5–5.3)
RBC # BLD AUTO: 3.39 MILLION/UL (ref 3.81–5.12)
RBC MORPH BLD: PRESENT
SODIUM SERPL-SCNC: 139 MMOL/L (ref 135–147)
T4 FREE SERPL-MCNC: 0.79 NG/DL (ref 0.61–1.12)
WBC # BLD AUTO: 15.13 THOUSAND/UL (ref 4.31–10.16)

## 2025-05-13 PROCEDURE — 99223 1ST HOSP IP/OBS HIGH 75: CPT | Performed by: STUDENT IN AN ORGANIZED HEALTH CARE EDUCATION/TRAINING PROGRAM

## 2025-05-13 PROCEDURE — 70553 MRI BRAIN STEM W/O & W/DYE: CPT

## 2025-05-13 PROCEDURE — 82948 REAGENT STRIP/BLOOD GLUCOSE: CPT

## 2025-05-13 PROCEDURE — 85007 BL SMEAR W/DIFF WBC COUNT: CPT

## 2025-05-13 PROCEDURE — A9585 GADOBUTROL INJECTION: HCPCS

## 2025-05-13 PROCEDURE — 99232 SBSQ HOSP IP/OBS MODERATE 35: CPT

## 2025-05-13 PROCEDURE — 99223 1ST HOSP IP/OBS HIGH 75: CPT

## 2025-05-13 PROCEDURE — 85027 COMPLETE CBC AUTOMATED: CPT

## 2025-05-13 PROCEDURE — 84439 ASSAY OF FREE THYROXINE: CPT

## 2025-05-13 PROCEDURE — 80048 BASIC METABOLIC PNL TOTAL CA: CPT

## 2025-05-13 RX ORDER — METHOCARBAMOL 500 MG/1
500 TABLET, FILM COATED ORAL EVERY 6 HOURS PRN
Status: DISCONTINUED | OUTPATIENT
Start: 2025-05-13 | End: 2025-05-13

## 2025-05-13 RX ORDER — GADOBUTROL 604.72 MG/ML
5 INJECTION INTRAVENOUS
Status: COMPLETED | OUTPATIENT
Start: 2025-05-13 | End: 2025-05-13

## 2025-05-13 RX ORDER — LIDOCAINE 50 MG/G
1 PATCH TOPICAL DAILY
Status: DISCONTINUED | OUTPATIENT
Start: 2025-05-13 | End: 2025-05-15 | Stop reason: HOSPADM

## 2025-05-13 RX ORDER — HYDROMORPHONE HYDROCHLORIDE 4 MG/1
4 TABLET ORAL EVERY 4 HOURS PRN
Refills: 0 | Status: DISCONTINUED | OUTPATIENT
Start: 2025-05-13 | End: 2025-05-15 | Stop reason: HOSPADM

## 2025-05-13 RX ORDER — ACETAMINOPHEN 325 MG/1
975 TABLET ORAL EVERY 6 HOURS SCHEDULED
Status: DISCONTINUED | OUTPATIENT
Start: 2025-05-13 | End: 2025-05-15 | Stop reason: HOSPADM

## 2025-05-13 RX ORDER — HYDROMORPHONE HYDROCHLORIDE 2 MG/1
2 TABLET ORAL EVERY 4 HOURS PRN
Refills: 0 | Status: DISCONTINUED | OUTPATIENT
Start: 2025-05-13 | End: 2025-05-15 | Stop reason: HOSPADM

## 2025-05-13 RX ORDER — HYDROMORPHONE HCL IN WATER/PF 6 MG/30 ML
0.2 PATIENT CONTROLLED ANALGESIA SYRINGE INTRAVENOUS EVERY 4 HOURS PRN
Status: DISCONTINUED | OUTPATIENT
Start: 2025-05-13 | End: 2025-05-15 | Stop reason: HOSPADM

## 2025-05-13 RX ORDER — ENOXAPARIN SODIUM 100 MG/ML
40 INJECTION SUBCUTANEOUS EVERY 24 HOURS
Status: DISCONTINUED | OUTPATIENT
Start: 2025-05-13 | End: 2025-05-15 | Stop reason: HOSPADM

## 2025-05-13 RX ORDER — METHOCARBAMOL 500 MG/1
250 TABLET, FILM COATED ORAL EVERY 6 HOURS PRN
Status: DISCONTINUED | OUTPATIENT
Start: 2025-05-13 | End: 2025-05-15 | Stop reason: HOSPADM

## 2025-05-13 RX ADMIN — Medication 3 MG: at 21:35

## 2025-05-13 RX ADMIN — DEXAMETHASONE 2 MG: 2 TABLET ORAL at 05:44

## 2025-05-13 RX ADMIN — QUETIAPINE FUMARATE 12.5 MG: 25 TABLET ORAL at 21:33

## 2025-05-13 RX ADMIN — SENNOSIDES 8.6 MG: 8.6 TABLET, FILM COATED ORAL at 08:28

## 2025-05-13 RX ADMIN — LEVETIRACETAM 1000 MG: 500 TABLET, FILM COATED ORAL at 21:35

## 2025-05-13 RX ADMIN — DEXAMETHASONE 2 MG: 2 TABLET ORAL at 21:33

## 2025-05-13 RX ADMIN — DEXAMETHASONE 2 MG: 2 TABLET ORAL at 16:19

## 2025-05-13 RX ADMIN — PANTOPRAZOLE SODIUM 40 MG: 40 TABLET, DELAYED RELEASE ORAL at 05:44

## 2025-05-13 RX ADMIN — LIDOCAINE 1 PATCH: 50 PATCH CUTANEOUS at 10:57

## 2025-05-13 RX ADMIN — ACETAMINOPHEN 975 MG: 325 TABLET, FILM COATED ORAL at 08:28

## 2025-05-13 RX ADMIN — METHOCARBAMOL 250 MG: 500 TABLET ORAL at 16:22

## 2025-05-13 RX ADMIN — ACETAMINOPHEN 975 MG: 325 TABLET, FILM COATED ORAL at 16:19

## 2025-05-13 RX ADMIN — ASPIRIN 81 MG: 81 TABLET, CHEWABLE ORAL at 08:28

## 2025-05-13 RX ADMIN — AMLODIPINE BESYLATE 5 MG: 5 TABLET ORAL at 08:28

## 2025-05-13 RX ADMIN — GABAPENTIN 200 MG: 100 CAPSULE ORAL at 08:28

## 2025-05-13 RX ADMIN — GADOBUTROL 5 ML: 604.72 INJECTION INTRAVENOUS at 12:27

## 2025-05-13 RX ADMIN — ACETAMINOPHEN 975 MG: 325 TABLET, FILM COATED ORAL at 02:02

## 2025-05-13 RX ADMIN — LEVETIRACETAM 1000 MG: 500 TABLET, FILM COATED ORAL at 08:27

## 2025-05-13 RX ADMIN — GABAPENTIN 300 MG: 300 CAPSULE ORAL at 21:33

## 2025-05-13 RX ADMIN — LEVOTHYROXINE SODIUM 50 MCG: 0.05 TABLET ORAL at 05:44

## 2025-05-13 RX ADMIN — ATORVASTATIN CALCIUM 40 MG: 40 TABLET, FILM COATED ORAL at 16:20

## 2025-05-13 RX ADMIN — ACETAMINOPHEN 975 MG: 325 TABLET, FILM COATED ORAL at 23:11

## 2025-05-13 RX ADMIN — CYANOCOBALAMIN TAB 500 MCG 1000 MCG: 500 TAB at 08:28

## 2025-05-13 RX ADMIN — DICLOFENAC SODIUM 2 G: 10 GEL TOPICAL at 21:35

## 2025-05-13 NOTE — ASSESSMENT & PLAN NOTE
CC: Fall at home on aspirin.  Recent admission on 5/5 and discharged on 5/7 for SLS and found to have infarct in R MCA territory.  Continue with aspirin and statin, Xarelto discontinued in April 2025 after new foci of hemorrhage within the left occipital metastasis.  Vasogenic brain edema, maintained on dexamethasone.  CT CAP with contrast:  Evidence of disease progression with several new and enlarging lesions, new lesions in right lower lobe, right thigh intramuscular mass (R thigh mass contrast enhancing, possible hematoma however not superficial on imaging).  Redemonstrated heterogeneous right renal mass A/W urothelial enhancement, concerning for metastatic lesion with probable involvement of the collecting system.  CTA head and neck:  Redemonstrated vasogenic edema left parietal vertex.  No hemorrhage identified.  No significant carotid or vertebral artery stenosis, no focal intracranial stenosis or aneurysm.  Positive labs:  Negative labs: UA, troponin, CBC (baseline leukocytosis, ACD), CMP, LDL, A1c, glucose on admission.  Pending labs: Magnesium  Low concern for infectious or metabolic etiology, no acute traumatic injury noted on imaging.  Likely related to increasing burden of metastasis or recrudescence.  5/13/25 - MRI brain wo contrast. - stable enhancing lesions in left occipital and parietal lobe surrounding vasogenic edema. No new enhancing lesions   TSH - 0.408    Plan:  Palliative care, neurology consult.  Consider oncology consult, patient stated that she was supposed to start chemotherapy this week.  F/u free T4  PT/OT evaluation

## 2025-05-13 NOTE — PROGRESS NOTES
Progress Note - Hospitalist   Name: Ayla Nye 74 y.o. female I MRN: [id number]  Unit/Bed#: S -01 I Date of Admission: 5/12/2025   Date of Service: 5/13/2025 I Hospital Day: 1    Assessment & Plan  Stroke-like symptoms  CC: Fall at home on aspirin.  Recent admission on 5/5 and discharged on 5/7 for SLS and found to have infarct in R MCA territory.  Continue with aspirin and statin, Xarelto discontinued in April 2025 after new foci of hemorrhage within the left occipital metastasis.  Vasogenic brain edema, maintained on dexamethasone.  CT CAP with contrast:  Evidence of disease progression with several new and enlarging lesions, new lesions in right lower lobe, right thigh intramuscular mass (R thigh mass contrast enhancing, possible hematoma however not superficial on imaging).  Redemonstrated heterogeneous right renal mass A/W urothelial enhancement, concerning for metastatic lesion with probable involvement of the collecting system.  CTA head and neck:  Redemonstrated vasogenic edema left parietal vertex.  No hemorrhage identified.  No significant carotid or vertebral artery stenosis, no focal intracranial stenosis or aneurysm.  Positive labs:  Negative labs: UA, troponin, CBC (baseline leukocytosis, ACD), CMP, LDL, A1c, glucose on admission.  Pending labs: Magnesium  Low concern for infectious or metabolic etiology, no acute traumatic injury noted on imaging.  Likely related to increasing burden of metastasis or recrudescence.  5/13/25 - MRI brain wo contrast. - stable enhancing lesions in left occipital and parietal lobe surrounding vasogenic edema. No new enhancing lesions   TSH - 0.408    Plan:  Palliative care, neurology consult.  Consider oncology consult, patient stated that she was supposed to start chemotherapy this week.  F/u free T4  PT/OT evaluation   Vasogenic brain edema (HCC)  2/2 to metastatic adenocarcinoma  Maintained on Decadron 2 mg BID PTA  Per neuro recs, Decadron 10 mg x 1 now,  then 4 mg BID x 3 days, then 2 mg TID.  Prior provider discussed with radiation oncology, recommend continue with Decadron taper, follow-up MRI outpatient, follow-up radiation oncology outpatient with Dr. Vuong.  During prior admission, no role for additional radiation 5/6/2025.  Plan:  See SLS  Left-sided weakness  See under SLS.  HTN (hypertension)  Continue home amlodipine.  BP goal-normotensive.  IV labetalol as needed.  Mixed hyperlipidemia  Continue home atorvastatin.  Insomnia  Continue home melatonin.  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 Castro and radiation oncology  S/p radiation therapy from July to September, was on Keytruda which was stopped secondary to pneumonitis, on tapering dose of Decadron,   Has had stereotactic radiation to brain twice-in August 2024 in March 2025.    Per hemonc:     First line metastatic treatment     Carboplatin AUC 5 Pemetrexed 500 mg/m2 and Pembrolizumab 200 mg IV every 3 weeks x 4 cycles     C1 held due to hospital admission  C1 held due to radiation and concurrent high-dose steroid use     C1 now 10/7/2024  C2 10/28/2024-held due to admission     C2 11/14/2024- completed      No treatment since 11/14/2024 due to multiple admissions/toxicities, PCP pneumonia, PE on Xarelto      Second Line metastatic single agent Taoxtere 75 mg/m2     C1 4/4/2025     C2 held 4/24/2025-will decrease to 60 mg/m2 due to toxicities     C2 will be given 5/5/2025 at 60 mg/m2 due to toxicities/potential tolerance     C2 deferred following hospital admission, not yet administered  Hypothyroidism  Continue home levothyroxine 50 mcg daily.  Cancer associated pain  Continue home gabapentin, hydromorphone 2 mg every 4 hours as needed, acetaminophen 975 mg.  Patient states that her pain is well controlled on this regimen.  Palliative care patient  Follows with  ZULEYMA Richards.  GERD (gastroesophageal reflux disease)  Continue home pantoprazole.  History of stroke  R frontal subacute infarct, noted on imaging on 04/21/25  C/w aspirin & statin.  Anemia  Recent Labs     05/12/25  1423 05/13/25  0436   HGB 10.7* 9.7*     Anemia of chronic disease.  Hemoglobin baseline 9-10.  Continue to monitor.  History of pulmonary embolism  Previously on Xarelto, which was d/c'd after a new foci of hemorrhage w/in left occipital metastasis during prior admission.  Aspirin daily.    VTE Pharmacologic Prophylaxis: VTE Score: 4 VTE covered by:  enoxaparin, Subcutaneous         Mobility:   Basic Mobility Inpatient Raw Score: 18  JH-HLM Goal: 6: Walk 10 steps or more  JH-HLM Achieved: 7: Walk 25 feet or more  JH-HLM Goal achieved. Continue to encourage appropriate mobility.    Patient Centered Rounds: I performed bedside rounds with nursing staff today.   Discussions with Specialists or Other Care Team Provider: Palliative, Neurology    Education and Discussions with Family / Patient: Updated  (daughter) via phone.    Current Length of Stay: 1 day(s)  Current Patient Status: Inpatient   Certification Statement: The patient will continue to require additional inpatient hospital stay due to Neurology Evaluation   Discharge Plan: Anticipate discharge in 24-48 hrs to home.    Code Status: Level 3 - DNAR and DNI    Subjective   Ms. Nye complained of low back pain that was relieved with tylenol today, she noted abdominal pain that subsides after passing gas, and left sided weakness of her upper and lower extremities. She denies headache, shortness of breath, chest pain, abdominal pain, numbness, dizziness, weakness, or changes in balance.     Objective :  Temp:  [97.9 °F (36.6 °C)-98.5 °F (36.9 °C)] 98.5 °F (36.9 °C)  HR:  [74-99] 96  BP: (128-172)/() 157/91  Resp:  [16-20] 17  SpO2:  [93 %-97 %] 94 %  O2 Device: None (Room air)    Body mass index is 19.26 kg/m².      Input and Output Summary (last 24 hours):     Intake/Output Summary (Last 24 hours) at 5/13/2025 1344  Last data filed at 5/13/2025 0601  Gross per 24 hour   Intake --   Output 550 ml   Net -550 ml       Physical Exam  Eyes:      Extraocular Movements: Extraocular movements intact.   Cardiovascular:      Rate and Rhythm: Normal rate.      Heart sounds: No murmur heard.     No friction rub. No gallop.   Pulmonary:      Effort: Pulmonary effort is normal.      Breath sounds: No wheezing, rhonchi or rales.   Abdominal:      General: There is no distension.      Palpations: Abdomen is soft. There is no mass.      Tenderness: There is no abdominal tenderness.   Musculoskeletal:         General: No swelling.   Neurological:      Mental Status: She is oriented to person, place, and time.      Cranial Nerves: No cranial nerve deficit.      Sensory: No sensory deficit.      Motor: Weakness present.         Lines/Drains: None         Telemetry:  Telemetry Orders (From admission, onward)               24 Hour Telemetry Monitoring  Continuous x 24 Hours (Telem)        Question:  Reason for 24 Hour Telemetry  Answer:  TIA/Suspected CVA/ Confirmed CVA                     Telemetry Reviewed: Normal Sinus Rhythm  Indication for Continued Telemetry Use: No indication for continued use. Will discontinue.                Lab Results: I have reviewed the following results:   Results from last 7 days   Lab Units 05/13/25  0436   WBC Thousand/uL 15.13*   HEMOGLOBIN g/dL 9.7*   HEMATOCRIT % 31.7*   PLATELETS Thousands/uL 322   LYMPHO PCT % 6*   MONO PCT % 4   EOS PCT % 0     Results from last 7 days   Lab Units 05/13/25  0436 05/12/25  1423   SODIUM mmol/L 139 141   POTASSIUM mmol/L 4.5 4.3   CHLORIDE mmol/L 106 104   CO2 mmol/L 25 27   BUN mg/dL 24 23   CREATININE mg/dL 0.88 1.00   ANION GAP mmol/L 8 10   CALCIUM mg/dL 9.2 9.3   ALBUMIN g/dL  --  4.1   TOTAL BILIRUBIN mg/dL  --  0.21   ALK PHOS U/L  --  57   ALT U/L  --  10   AST U/L   --  9*   GLUCOSE RANDOM mg/dL 112 177*         Results from last 7 days   Lab Units 05/12/25  1400   POC GLUCOSE mg/dl 180*               Recent Cultures (last 7 days):         Imaging Results Review: No pertinent imaging studies reviewed.  Other Study Results Review: No additional pertinent studies reviewed.    Last 24 Hours Medication List:     Current Facility-Administered Medications:     acetaminophen (TYLENOL) tablet 975 mg, Q6H KRISS    aluminum-magnesium hydroxide-simethicone (MAALOX) oral suspension 30 mL, Q4H PRN    amLODIPine (NORVASC) tablet 5 mg, Daily    aspirin chewable tablet 81 mg, Daily    atorvastatin (LIPITOR) tablet 40 mg, Daily With Dinner    cyanocobalamin (VITAMIN B-12) tablet 1,000 mcg, Daily    dexamethasone (DECADRON) tablet 2 mg, Q8H KRISS    Diclofenac Sodium (VOLTAREN) 1 % topical gel 2 g, 4x Daily    enoxaparin (LOVENOX) subcutaneous injection 40 mg, Q24H    gabapentin (NEURONTIN) capsule 200 mg, Daily **AND** gabapentin (NEURONTIN) capsule 300 mg, HS    HYDROmorphone (DILAUDID) tablet 2 mg, Q4H PRN **OR** HYDROmorphone (DILAUDID) tablet 4 mg, Q4H PRN    HYDROmorphone HCl (DILAUDID) injection 0.2 mg, Q4H PRN    levETIRAcetam (KEPPRA) tablet 1,000 mg, BID    levothyroxine tablet 50 mcg, Early Morning    lidocaine (LIDODERM) 5 % patch 1 patch, Daily    melatonin tablet 3 mg, HS    methocarbamol (ROBAXIN) tablet 250 mg, Q6H PRN    naloxone (NARCAN) 0.04 mg/mL syringe 0.04 mg, Q1MIN PRN    ondansetron (ZOFRAN-ODT) dispersible tablet 4 mg, Q6H PRN    pantoprazole (PROTONIX) EC tablet 40 mg, Daily Before Breakfast    QUEtiapine (SEROquel) tablet 12.5 mg, HS    senna (SENOKOT) tablet 8.6 mg, Daily    [START ON 5/14/2025] sulfamethoxazole-trimethoprim (BACTRIM DS) 800-160 mg per tablet 1 tablet, Once per day on Monday Wednesday Friday    Administrative Statements   Today, Patient Was Seen By: Greg Jarrett MD  I have spent a total time of 35 minutes in caring for this patient on the day of the  visit/encounter including Counseling / Coordination of care.    **Please Note: This note may have been constructed using a voice recognition system.**

## 2025-05-13 NOTE — ASSESSMENT & PLAN NOTE
Recent Labs     05/12/25  1423 05/13/25  0436   HGB 10.7* 9.7*     Anemia of chronic disease.  Hemoglobin baseline 9-10.  Continue to monitor.

## 2025-05-13 NOTE — ASSESSMENT & PLAN NOTE
Current Pain Regimen:  PO hydromorphone 2-4 mg Q4hrs PRN for moderate/severe pain  IV hydromorphone 0.2 mg Q4hrs PRN for BTP  ATC Tylenol 975 mg Q6hrs  Added Robaxin 250 mg Q6hrs PRN for muscle spasms  Gabapentin 200 mg daily and 300 daily at bedtime   Narcan for safety   Other Symptom Management:  PO Zofran 4 mg Q6hrs PRN     OME's in the past 24 hours: 0

## 2025-05-13 NOTE — CONSULTS
Consultation - Neurology   Name: Ayla Nye 74 y.o. female I MRN: [id number]  Unit/Bed#: S -01 I Date of Admission: 5/12/2025   Date of Service: 5/13/2025 I Hospital Day: 1   Inpatient consult to Neurology  Consult performed by: Mynor Ceja MD  Consult ordered by: Greg Jarrett MD        Physician Requesting Evaluation: Georgie iBshop DO   Reason for Evaluation / Principal Problem: Strokelike symptoms      Assessment/Plan:    Stroke Like Symptoms: patient w/ hx lung adenocarcinoma w/ brain mets (s/p chemo, radiation, immunotherapy, stereotactic brain radiation Aug 2024 and Jan 2025) presenting with worsening left upper extremity and left lower extremity weakness which led to a mechanical fall yesterday with head strike.  Patient is not on any anticoagulation.  CT head showing vasogenic edema of the left parietal vertex compatible with the known brain metastasis but no hemorrhage is identified.  CTA angiography negative for any LVO or significant stenosis or aneurysm.  Brain MRI done, showing stable enhancing lesions within the left superior occipital lobe and left parietal lobe with surrounding vasogenic edema, as well as stable foci of restricted diffusion within the right posterior frontal lobe most likely sequela of subacute ischemia.  Neurological exam consistent with 4 out of 5 strength left upper extremity and left lower extremity, no sensory deficits or cerebellar signs noted.  Exam is similar to her exam last week.  Do not suspect that the patient's presentation is due to stroke as we have ruled this out with a brain MRI  Continue ASA 81 mg daily  Continue Lipitor 40 mg daily  Right Frontal Subacute Infarct: Seen on MRI brain from April 2025, after which she was started on aspirin and Lipitor.  Lung Adenocarcinoma with Brain Metastasis: Follows with Dr. Her shock at [facility].  Completed systemic therapy with carboplatin and pemetrexed, most recently January 2025.  She has a  history of brain mets to the left occipital lobe and has had a stereotactic brain radiation in August 2024 and in August 2025.  She has been maintained on steroids with Decadron 2 mg daily.  Had a brain MRI April 2025 showing stable appearance of the left brain metastasis but no new metastasis.  Repeat brain MRI on May 2025 showing slightly increased in size of the peripherally enhancing metastatic lesions of the left parieto-occipital lobe, with stable surrounding vasogenic edema.  Patient this admission is presenting with worsening left upper extremity and left lower extremity weakness leading to an ambulatory dysfunction mechanical type of fall.  Brain MRI today is showing stable enhancing lesions in the left superior occipital lobe and left parietal lobe with surrounding vasogenic edema, as well as stable sequela of subacute ischemia, with no new enhancing lesions, mass effect, or progression.  Will defer steroid management to primary team and the radiation oncology team  History of pulmonary embolism: Was previously on Xarelto, but this was discontinued after she had a foci of hemorrhage within the left occipital brain mets.  Hypertension  Hyperlipidemia    Recommendations for outpatient neurological follow up have yet to be determined.    History of Present Illness   Ayla Nye is a 74 y.o.  female with past medical history of stage IV lung adenocarcinoma with brain mets, status post radiation, with a recent admission for left upper extremity weakness a month ago and was found to have a right MCA frontal lobe stroke, hypertension, history of PE, hypothyroidism who presents with a fall.  Patient states that she was try to throw something away, and her feet tripped up, leading to a mechanical fall.  She remembers the entire episode, did not lose consciousness.  She did hit the back of her head.  Atraumatic workup was negative in the ED.  She states that she has been having worsening left upper extremity  and left lower extremity weakness even after she was discharged last week for the similar complaints.  A brain MRI was done last week that showed a slight increase in the left occipital brain metastasis with vasogenic edema.  She was already on Decadron 2 mg daily, for which we increased transiently her p.o. steroids with a short taper to come back down to 4 mg daily.  Radiation oncology evaluated her at that time and did not deem her to need any radiation therapy at this time and to return for an appointment in June.    Patient denies any sensory deficits or any speech disturbances.    Review of Systems   Constitutional:  Negative for chills and fever.   HENT:  Negative for ear pain and sore throat.    Eyes:  Negative for pain and visual disturbance.   Respiratory:  Negative for cough and shortness of breath.    Cardiovascular:  Negative for chest pain and palpitations.   Gastrointestinal:  Negative for abdominal pain and vomiting.   Genitourinary:  Negative for dysuria and hematuria.   Musculoskeletal:  Negative for arthralgias and back pain.   Skin:  Negative for color change and rash.   Neurological:  Positive for weakness. Negative for seizures and syncope.   All other systems reviewed and are negative.       Historical Information   Past Medical History:   Diagnosis Date    DILLON (acute kidney injury) (HCC) 02/14/2025    Allergic 2022    Allergic to neomiacin and cephalexin    Cancer (HCC)     lung cancer    Cancer (HCC)     mets to brain    Cancer (HCC)     perianal mass    Cataract Nov. 2023    Due to have durgery June 2024    Essential thrombocytosis (HCC)     controlled with medication    History of transfusion     Hyperlipidemia 2015    Hypertension 2011    Mass in chest     Nodular goiter     Osteoporosis     Peripheral neuropathy     Pneumonia Oct 16, 2023    Also  Feb 2024    Psoriasis     SIRS (systemic inflammatory response syndrome) (HCC) 06/22/2024     Past Surgical History:   Procedure Laterality  Date    APPENDECTOMY      BREAST CYST EXCISION Right     COLONOSCOPY  10/31/2018    DXA PROCEDURE (HISTORICAL)  2017    IR BIOPSY OTHER  2024    IR LUMBAR PUNCTURE  2024    MAMMO (HISTORICAL)      18    MAMMO NEEDLE LOCALIZATION RIGHT (ALL INC) Right 2009    SKIN LESION EXCISION      bridge of nose     Social History     Tobacco Use    Smoking status: Former     Current packs/day: 1.00     Average packs/day: 1 pack/day for 59.4 years (59.4 ttl pk-yrs)     Types: Cigarettes     Start date:      Passive exposure: Past    Smokeless tobacco: Never    Tobacco comments:     Quit    Vaping Use    Vaping status: Never Used   Substance and Sexual Activity    Alcohol use: Not Currently     Alcohol/week: 5.0 standard drinks of alcohol     Types: 5 Glasses of wine per week    Drug use: Never    Sexual activity: Not Currently     Partners: Male     Birth control/protection: Post-menopausal     E-Cigarette/Vaping    E-Cigarette Use Never User      E-Cigarette/Vaping Substances    Nicotine No     THC No     CBD No     Flavoring No     Other No     Unknown No      Family History   Problem Relation Age of Onset    Stroke Mother     Depression Mother             Hypertension Mother     Hearing loss Mother     Tuberculosis Father     Cancer Brother         lung    Tuberculosis Brother     Coronary artery disease Brother     Hypertension Brother     Heart disease Brother     No Known Problems Daughter     No Known Problems Daughter     No Known Problems Maternal Grandmother     No Known Problems Maternal Grandfather     No Known Problems Paternal Grandmother     No Known Problems Paternal Grandfather     No Known Problems Maternal Aunt     No Known Problems Maternal Aunt     No Known Problems Maternal Aunt     No Known Problems Maternal Aunt     No Known Problems Paternal Aunt     Cancer Brother         Throat cancer     Social History     Tobacco Use    Smoking status: Former      Current packs/day: 1.00     Average packs/day: 1 pack/day for 59.4 years (59.4 ttl pk-yrs)     Types: Cigarettes     Start date: 1966     Passive exposure: Past    Smokeless tobacco: Never    Tobacco comments:     Quit 2010   Vaping Use    Vaping status: Never Used   Substance and Sexual Activity    Alcohol use: Not Currently     Alcohol/week: 5.0 standard drinks of alcohol     Types: 5 Glasses of wine per week    Drug use: Never    Sexual activity: Not Currently     Partners: Male     Birth control/protection: Post-menopausal       Current Facility-Administered Medications:     acetaminophen (TYLENOL) tablet 975 mg, Q6H KRISS    aluminum-magnesium hydroxide-simethicone (MAALOX) oral suspension 30 mL, Q4H PRN    amLODIPine (NORVASC) tablet 5 mg, Daily    aspirin chewable tablet 81 mg, Daily    atorvastatin (LIPITOR) tablet 40 mg, Daily With Dinner    cyanocobalamin (VITAMIN B-12) tablet 1,000 mcg, Daily    dexamethasone (DECADRON) tablet 2 mg, Q8H KRISS    Diclofenac Sodium (VOLTAREN) 1 % topical gel 2 g, 4x Daily    enoxaparin (LOVENOX) subcutaneous injection 40 mg, Q24H    gabapentin (NEURONTIN) capsule 200 mg, Daily **AND** gabapentin (NEURONTIN) capsule 300 mg, HS    HYDROmorphone (DILAUDID) tablet 2 mg, Q4H PRN **OR** HYDROmorphone (DILAUDID) tablet 4 mg, Q4H PRN    HYDROmorphone HCl (DILAUDID) injection 0.2 mg, Q4H PRN    levETIRAcetam (KEPPRA) tablet 1,000 mg, BID    levothyroxine tablet 50 mcg, Early Morning    lidocaine (LIDODERM) 5 % patch 1 patch, Daily    melatonin tablet 3 mg, HS    methocarbamol (ROBAXIN) tablet 250 mg, Q6H PRN    naloxone (NARCAN) 0.04 mg/mL syringe 0.04 mg, Q1MIN PRN    ondansetron (ZOFRAN-ODT) dispersible tablet 4 mg, Q6H PRN    pantoprazole (PROTONIX) EC tablet 40 mg, Daily Before Breakfast    QUEtiapine (SEROquel) tablet 12.5 mg, HS    senna (SENOKOT) tablet 8.6 mg, Daily    [START ON 5/14/2025] sulfamethoxazole-trimethoprim (BACTRIM DS) 800-160 mg per tablet 1 tablet, Once per day on  Monday Wednesday Friday  Prior to Admission Medications   Prescriptions Last Dose Informant Patient Reported? Taking?   HYDROmorphone (DILAUDID) 2 mg tablet 5/11/2025 Bedtime  No Yes   Sig: Take 1 tablet (2 mg total) by mouth every 4 (four) hours as needed for moderate pain Max Daily Amount: 12 mg   QUEtiapine (SEROquel) 25 mg tablet 5/11/2025 Bedtime  No Yes   Sig: Take 0.5 tablets (12.5 mg total) by mouth daily at bedtime   aluminum-magnesium hydroxide-simethicone (MAALOX) 7693-1660-340 mg/30 mL suspension 5/12/2025 Morning  No Yes   Sig: Take 30 mL by mouth every 4 (four) hours as needed for indigestion or heartburn   amLODIPine (NORVASC) 5 mg tablet 5/12/2025  No Yes   Sig: TAKE 1 TABLET(5 MG) BY MOUTH DAILY   aspirin 81 mg chewable tablet 5/12/2025 Morning  No Yes   Sig: Chew 1 tablet (81 mg total) daily for 29 doses   atorvastatin (LIPITOR) 40 mg tablet 5/11/2025 Evening  No Yes   Sig: Take 1 tablet (40 mg total) by mouth daily with dinner   dexamethasone (DECADRON) 2 mg tablet 5/12/2025 Morning  No Yes   Sig: Take 2 tablets (4 mg total) by mouth every 12 (twelve) hours for 3 days, THEN 1 tablet (2 mg total) every 8 (eight) hours.   gabapentin (NEURONTIN) 100 mg capsule 5/12/2025  No Yes   Sig: TAKE 1 CAPSULE BY MOUTH EVERY MORNING, 1 CAPSULE EVERY AFTERNOON AND 3 CAPSULES EVERY NIGHT AT BEDTIME   Patient taking differently: TAKE 2 CAPSULES BY MOUTH EVERY MORNING AND 3 CAPSULES EVERY NIGHT AT BEDTIME (patient did not change her regimen per doctor)   levETIRAcetam (KEPPRA) 1000 MG tablet 5/12/2025 Morning  No Yes   Sig: TAKE 1 TABLET(1000 MG) BY MOUTH EVERY 12 HOURS   levothyroxine 50 mcg tablet 5/12/2025 Morning  No Yes   Sig: TAKE 1 TABLET(50 MCG) BY MOUTH DAILY EARLY MORNING   melatonin 3 mg 5/11/2025 Bedtime  No Yes   Sig: Take 1 tablet (3 mg total) by mouth daily at bedtime   ondansetron (ZOFRAN) 4 mg tablet More than a month  No No   Sig: Take 1 tablet (4 mg total) by mouth every 8 (eight) hours as needed  for nausea or vomiting   pantoprazole (PROTONIX) 40 mg tablet 5/12/2025 Morning  No Yes   Sig: Take 1 tablet (40 mg total) by mouth daily in the early morning   senna (SENOKOT) 8.6 MG tablet 5/10/2025 Self Yes Yes   Sig: Take 1 tablet by mouth daily Takes one every other day   sulfamethoxazole-trimethoprim (BACTRIM DS) 800-160 mg per tablet 5/12/2025 Morning  No Yes   Sig: Take 1 tablet by mouth 3 (three) times a week Starting 12/24, you can take one tablet Monday, Wednesday, and Fridays.   vitamin B-12 (VITAMIN B-12) 1,000 mcg tablet 5/12/2025 Morning Self No Yes   Sig: Take 1 tablet (1,000 mcg total) by mouth daily      Facility-Administered Medications: None     Doxycycline, Keflex [cephalexin], and Neomycin-polymyxin-dexameth    Objective :  Temp:  [97.9 °F (36.6 °C)-98.5 °F (36.9 °C)] 98.5 °F (36.9 °C)  HR:  [74-99] 96  BP: (128-172)/() 157/91  Resp:  [16-20] 17  SpO2:  [93 %-97 %] 94 %  O2 Device: None (Room air)    Physical Exam  Vitals and nursing note reviewed.   Constitutional:       General: She is not in acute distress.     Appearance: Normal appearance. She is well-developed. She is not ill-appearing.   HENT:      Head: Normocephalic and atraumatic.   Eyes:      Extraocular Movements: Extraocular movements intact.      Conjunctiva/sclera: Conjunctivae normal.      Pupils: Pupils are equal, round, and reactive to light.   Cardiovascular:      Rate and Rhythm: Normal rate and regular rhythm.      Heart sounds: No murmur heard.     No friction rub. No gallop.   Pulmonary:      Effort: Pulmonary effort is normal. No respiratory distress.      Breath sounds: Normal breath sounds. No wheezing or rales.   Abdominal:      General: There is no distension.      Palpations: Abdomen is soft.      Tenderness: There is no abdominal tenderness. There is no guarding.   Musculoskeletal:         General: No swelling.      Cervical back: Neck supple.      Right lower leg: No edema.      Left lower leg: No edema.    Skin:     General: Skin is warm and dry.      Capillary Refill: Capillary refill takes less than 2 seconds.   Neurological:      Mental Status: She is oriented to person, place, and time.      Sensory: No sensory deficit.      Motor: Weakness (4/5 LUE, 4/5 LLE) present.      Coordination: Coordination normal.   Psychiatric:         Mood and Affect: Mood normal.         Speech: Speech normal.         Behavior: Behavior normal.     Neurological Exam  Mental Status  Awake, alert and oriented to person, place and time. Oriented to person, place, time and situation. Oriented to person, place, and time. Speech is normal.    Cranial Nerves  CN I: Sense of smell is normal.  CN II: Visual acuity is normal. Visual fields full to confrontation.  CN III, IV, VI: Extraocular movements intact bilaterally. Pupils equal round and reactive to light bilaterally.  CN V: Facial sensation is normal.  CN VII: Full and symmetric facial movement.  CN VIII: Hearing is normal.  CN IX, X: Palate elevates symmetrically. Normal gag reflex.  CN XI: Shoulder shrug strength is normal.  CN XII: Tongue midline without atrophy or fasciculations.    Motor  Normal muscle bulk throughout. No fasciculations present.        Lab Results: I have reviewed the following results:CBC:   Results from last 7 days   Lab Units 05/13/25  0436 05/12/25  1423 05/07/25  0511   WBC Thousand/uL 15.13* 14.41* 10.21*   RBC Million/uL 3.39* 3.84 3.27*   HEMOGLOBIN g/dL 9.7* 10.7* 9.1*   HEMATOCRIT % 31.7* 35.9 30.6*   MCV fL 94 94 94   PLATELETS Thousands/uL 322 386 349   , BMP/CMP:   Results from last 7 days   Lab Units 05/13/25  0436 05/12/25  1423 05/07/25  0511   SODIUM mmol/L 139 141 141   POTASSIUM mmol/L 4.5 4.3 4.1   CHLORIDE mmol/L 106 104 106   CO2 mmol/L 25 27 27   BUN mg/dL 24 23 24   CREATININE mg/dL 0.88 1.00 0.74   CALCIUM mg/dL 9.2 9.3 9.0   AST U/L  --  9*  --    ALT U/L  --  10  --    ALK PHOS U/L  --  57  --    EGFR ml/min/1.73sq m 64 55 80     Recent  Labs     05/12/25  1423 05/13/25  0436   WBC 14.41* 15.13*   HGB 10.7* 9.7*   HCT 35.9 31.7*    322   SODIUM 141 139   K 4.3 4.5    106   CO2 27 25   BUN 23 24   CREATININE 1.00 0.88   GLUC 177* 112   MG 2.0  --      Imaging Results Review: No pertinent imaging studies reviewed.  Other Study Results Review: No additional pertinent studies reviewed.    VTE Prophylaxis: VTE covered by:  enoxaparin, Subcutaneous

## 2025-05-13 NOTE — CASE MANAGEMENT
Case Management Assessment & Discharge Planning Note    Patient name Ayal Nye  Location S /S -01 MRN [id number]  : 1950 Date 2025       Current Admission Date: 2025  Current Admission Diagnosis:Stroke-like symptoms   Patient Active Problem List    Diagnosis Date Noted Date Diagnosed    Vasogenic brain edema (HCC) 2025     Abnormal CT of the chest 2025     Stroke-like symptoms 2025     History of stroke 2025     NSVT (nonsustained ventricular tachycardia) (HCC) 2025     Left-sided weakness 2025     Right loin pain 2025     Complication of chemotherapy/immunotherapy 02/15/2025     GERD (gastroesophageal reflux disease) 2025     Epistaxis 2025     History of Pneumocystis jirovecii pneumonia 2025     IgG deficiency (HCC) 2025     Right kidney mass 2025     History of pulmonary embolism 2024     Dyspnea on exertion 2024     Imbalance 2024     Hyponatremia 2024     Leukocytosis 10/27/2024     Anemia 10/25/2024     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     Palliative care patient 2024     Cancer associated pain 2024     Goals of care, counseling/discussion 2024     Right hip pain 09/10/2024     Altered mental status 2024     Hypothyroidism 2024     Abnormal imaging of central nervous system 2024     Acute metabolic encephalopathy 2024     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) 2024     Brain mass 2024     Metastatic cancer to brain (HCC) 2024     Dehydration 2024     Moderate protein-calorie malnutrition (HCC) 2024     Bilateral leg pain 2024     Insomnia 2024     Metastatic adenocarcinoma (HCC) 2024     Age-related osteoporosis without current pathological fracture 2023     Encounter for monitoring of hydroxyurea therapy 2023     JAK2 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 05/03/2023     HTN (hypertension) 08/10/2022     Mixed hyperlipidemia 08/10/2022     Essential thrombocytosis (HCC) 07/02/2020     TB lung, 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     Psoriatic arthritis (HCC) 09/10/2019       LOS (days): 1  Geometric Mean LOS (GMLOS) (days): 4.8  Days to GMLOS:3.9     OBJECTIVE:  PATIENT READMITTED TO HOSPITAL  Risk of Unplanned Readmission Score: 68.93         Current admission status: Inpatient       Preferred Pharmacy:   [id number] - [place] [facility] [place] - [address] [facility]  [address]  [facility] [place] [address]  Phone: [phone] Fax: [phone]    [facility] ([place] - [place] - [address]  [address] [facility]  [place] [address]  Phone: [phone] Fax: [phone]    Primary Care Provider: Rafy Angelo MD    Primary Insurance: MEDICARE  Secondary Insurance: AARP    ASSESSMENT:  Active Health Care Proxies       Juliana Camarena  Alternate Health Care Agent - Daughter   Primary Phone: [phone] (Mobile)  Home Phone: [phone]                           Readmission Root Cause  30 Day Readmission: Yes  During your hospital stay, did someone (provider, nurse, ) explain your care to you in a way you could understand?: Yes  Did you feel medically stable to leave the hospital?: Yes  Were you able to pay for your medication at the pharmacy?: Yes  Did you have reliable transportation to take you to your appointments?: Yes  During previous admission, was a post-acute recommendation made?: No  Patient was readmitted due to: stroke-like symptoms  Action Plan: PT/OT, MRI brain, palliative consult    Patient Information  Admitted from:: Home  Mental Status: Alert  Assessment information provided by:: Patient  Primary Caregiver: Self  Support Systems: Home care staff, Self  County of Residence: [place]  What city do you live in?: [place]  Home entry access options. Select all that apply.: Stairs  Number of  steps to enter home.: 6  Do the steps have railings?: Yes  Type of Current Residence: 2 story home  Upon entering residence, is there a bedroom on the main floor (no further steps)?: No  A bedroom is located on the following floor levels of residence (select all that apply):: 2nd Floor  Upon entering residence, is there a bathroom on the main floor (no further steps)?: No  Indicate which floors of current residence have a bathroom (select all the apply):: 2nd Floor  Number of steps to 2nd floor from main floor: One Flight  Living Arrangements: Lives Alone  Is patient a ?: No    Activities of Daily Living Prior to Admission  Functional Status: Independent  Completes ADLs independently?: Yes  Ambulates independently?: Yes  Does patient use assisted devices?: No  Does patient currently own DME?: Yes  What DME does the patient currently own?: Walker, Straight Cane  Does patient have a history of Outpatient Therapy (PT/OT)?: No  Does the patient have a history of Short-Term Rehab?: No  Does patient have a history of HHC?: Yes  Does patient currently have HHC?: Yes ([facility] for SN/PT/OT)    Current Home Health Care  Type of Current Home Care Services: Home PT, Home OT, Nurse visit  Home Health Agency Name:: [facility]  Current Home Health Follow-Up Provider:: PCP    Patient Information Continued  Income Source: Pension/custodial  Does patient have prescription coverage?: Yes  Can the patient afford their medications and any related supplies (such as glucometers or test strips)?: Yes  Does patient receive dialysis treatments?: No  Does patient have a history of substance abuse?: No  Does patient have a history of Mental Health Diagnosis?: No         Means of Transportation  [facility]      Social Determinants of Health (SDOH)      Flowsheet Row Most Recent Value   Housing Stability    In the last 12 months, was there a time when you were not able to pay the mortgage or rent on time? N    In the past 12 months, how many times have you moved where you were living? 0   At any time in the past 12 months, were you homeless or living in a shelter (including now)? N   Transportation Needs    In the past 12 months, has lack of transportation kept you from medical appointments or from getting medications? no   In the past 12 months, has lack of transportation kept you from meetings, work, or from getting things needed for daily living? No   Food Insecurity    Within the past 12 months, you worried that your food would run out before you got the money to buy more. Never true   Within the past 12 months, the food you bought just didn't last and you didn't have money to get more. Never true   Utilities    In the past 12 months has the electric, gas, oil, or water company threatened to shut off services in your home? No            DISCHARGE DETAILS:    Discharge planning discussed with:: patient  Freedom of Choice: Yes  Comments - Freedom of Choice: If STR is recommended FOC was explained to pt.  CM contacted family/caregiver?: No- see comments (pt decline stating her daughters will be in soon and she will update them,)  Were Treatment Team discharge recommendations reviewed with patient/caregiver?: Yes  Did patient/caregiver verbalize understanding of patient care needs?: Yes  Were patient/caregiver advised of the risks associated with not following Treatment Team discharge recommendations?: Yes         Requested Home Health Care         Is the patient interested in HHC at discharge?: Yes  Home Health Agency Name:: [facility]    DME Referral Provided  Referral made for DME?: No    Other Referral/Resources/Interventions Provided:  Referral Comments: Met with pt who provided information for initial assessment. Pt stated she lives alone in a 2SH. Prior to admission, pt independent with ambulation and ADLs. Pt does have a cane and walker at home if needed. Pt has a private HHA 5hrs/daily and currently open with  [facility] for skilled homecare needs. Pt stated she feels more weak compared to last admission and feels she may need rehab this time around after discharge. PT/OT is ordered. Case management will continue to follow for discharge planning needs.         Treatment Team Recommendation: Home with Home Health Care, Short Term Rehab  Discharge Destination Plan:: Home with Home Health Care, Short Term Rehab

## 2025-05-13 NOTE — ASSESSMENT & PLAN NOTE
2/2 to metastatic adenocarcinoma  Maintained on Decadron 2 mg BID PTA  Per neuro recs, Decadron 10 mg x 1 now, then 4 mg BID x 3 days, then 2 mg TID.  Prior provider discussed with radiation oncology, recommend continue with Decadron taper, follow-up MRI outpatient, follow-up radiation oncology outpatient with Dr. Vuong.  During prior admission, no role for additional radiation 5/6/2025.  Plan:  See SLS

## 2025-05-13 NOTE — ASSESSMENT & PLAN NOTE
"Palliative diagnoses: Stage IV adenocarcinoma of the lung    Goals of care  Level 3 code status  Disease focused care w/ DNR/DNI limits in place   Concerns introduced include:  Having intermittent neck and back pain  Neck pain feels like muscle cramping/muscle pain and has been ongoing for 2 weeks, back pain is not new. Regarding her back pain stated, \"I have cancer back there.\"   Feels that Tylenol works the best for her pain and prefers to take Tylenol - made ATC   Declined lidocaine patch for back, prefers to use heat packs   Agreeable to trying muscle relaxant, discussed risks/benefits   Takes Dilaudid at home and tolerates well, takes 2 mg in the day if needed and a 4 mg at bedtime   Having regular bowel movements, denies nausea/vomiting, no other symptom complaints   Stated that she wants to wait on the MRI to see what further answers it may provide and if it will show disease progression   Will continue discussions regarding GOC as patient's clinical presentation evolves.  Encouraged follow up with Palliative Medicine on an outpatient basis after discharge for continued symptom management. Our office will contact patient to schedule a hospital follow up.    Social support:  Supportive listening provided  Normalized experience of patient/family  Provided anxiety containment  Provided anticipatory guidance  Encouraged self care  Discussed spiritual needs  Living situation:  Follows w/ Home Palliative Program     Follow up  Palliative Care will continue to follow and goals of care discussions will be ongoing.    Please reach out via [employer] Secure Chat if questions or concerns arise.    Care Coordination  Reviewed case with n/a    PDMP Review: I have reviewed the patient's controlled substance dispensing history in the Prescription Drug Monitoring Program in compliance with the [place] regulations before prescribing any controlled substances.    Decisional apparatus: Patient does have capacity on exam today. If " capacity is lost, patient's substitute decision maker would default to daughters by [place] Act 169.    Advance Directive/Living Will, POLST and POA Forms: On file and reviewed, lists Juliana or Sue as medical POAs    ER contacts:  Name Relation Home Work Mobile   Juliana Camarena Daughter [phone]   Sue Antony Daughter [phone]   Elva Cardoza Sister In-Law   [phone]     We appreciate the invitation to be involved in this patient's care.  We will continue to follow throughout this hospitalization.  Please do not hesitate to reach our on call provider through our clinic answering service at [phone] should you have acute symptom control concerns.

## 2025-05-13 NOTE — TELEPHONE ENCOUNTER
Patient is calling regarding cancelling an appointment.    Date/Time: 5/13/25 11:00    Patient was rescheduled: YES [] NO [x]    Patient requesting call back to reschedule: YES [] NO [x]    Pt called to advise she is in the hospital.

## 2025-05-13 NOTE — CONSULTS
"Consultation - Palliative Care   Name: Ayla Nye 74 y.o. female I MRN: [id number]  Unit/Bed#: S -01 I Date of Admission: 5/12/2025   Date of Service: 5/13/2025 I Hospital Day: 1   Inpatient consult to Palliative Care  Consult performed by: ZULEYMA Mandujano  Consult ordered by: Greg Jarrett MD        Physician Requesting Evaluation: Georgie Bishop DO   Reason for Evaluation / Principal Problem: GoC    Assessment & Plan  Stroke-like symptoms  Presented to the ED s/p fall   Recent admission on 5/5 and discharged on 5/7 for SLS and found to have infarct in R MCA territory  CT CAP with contrast:  \"Evidence of disease progression with several new and enlarging lesions, new lesions in right lower lobe, right thigh intramuscular mass (R thigh mass contrast enhancing, possible hematoma however not superficial on imaging). Redemonstrated heterogeneous right renal mass A/W urothelial enhancement, concerning for metastatic lesion with probable involvement of the collecting system.\"  CTA head and neck:  \"Redemonstrated vasogenic edema left parietal vertex. No hemorrhage identified.  No significant carotid or vertebral artery stenosis, no focal intracranial stenosis or aneurysm.\"  Low concern for infectious or metabolic etiology, no acute traumatic injury. 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 Castro and radiation oncology  S/p radiation therapy from July to September, was on Keytruda which was stopped secondary to pneumonitis, on tapering dose of Decadron,   Has had stereotactic radiation to brain twice-in August 2024 and March 2025  Cancer associated pain  Current Pain Regimen:  PO hydromorphone 2-4 mg Q4hrs PRN for moderate/severe pain  IV hydromorphone 0.2 mg Q4hrs PRN for " "BTP  ATC Tylenol 975 mg Q6hrs  Added Robaxin 250 mg Q6hrs PRN for muscle spasms  Gabapentin 200 mg daily and 300 daily at bedtime   Narcan for safety   Other Symptom Management:  PO Zofran 4 mg Q6hrs PRN     OME's in the past 24 hours: 0    Palliative care patient  Palliative diagnoses: Stage IV adenocarcinoma of the lung    Goals of care  Level 3 code status  Disease focused care w/ DNR/DNI limits in place   Concerns introduced include:  Having intermittent neck and back pain  Neck pain feels like muscle cramping/muscle pain and has been ongoing for 2 weeks, back pain is not new. Regarding her back pain stated, \"I have cancer back there.\"   Feels that Tylenol works the best for her pain and prefers to take Tylenol - made ATC   Declined lidocaine patch for back, prefers to use heat packs   Agreeable to trying muscle relaxant, discussed risks/benefits   Takes Dilaudid at home and tolerates well, takes 2 mg in the day if needed and a 4 mg at bedtime   Having regular bowel movements, denies nausea/vomiting, no other symptom complaints   Stated that she wants to wait on the MRI to see what further answers it may provide and if it will show disease progression   Will continue discussions regarding GOC as patient's clinical presentation evolves.  Encouraged follow up with Palliative Medicine on an outpatient basis after discharge for continued symptom management. Our office will contact patient to schedule a hospital follow up.    Social support:  Supportive listening provided  Normalized experience of patient/family  Provided anxiety containment  Provided anticipatory guidance  Encouraged self care  Discussed spiritual needs  Living situation:  Follows w/ Home Palliative Program     Follow up  Palliative Care will continue to follow and goals of care discussions will be ongoing.    Please reach out via [employer] Secure Chat if questions or concerns arise.    Care Coordination  Reviewed case with n/a    PDMP Review: I have " reviewed the patient's controlled substance dispensing history in the Prescription Drug Monitoring Program in compliance with the [facility] regulations before prescribing any controlled substances.    Decisional apparatus: Patient does have capacity on exam today. If capacity is lost, patient's substitute decision maker would default to daughters by [place] Act 169.    Advance Directive/Living Will, POLST and POA Forms: On file and reviewed, lists Juliana or Sue as medical POAs    ER contacts:  Name Relation Home Work Mobile   Juliana Camarena Daughter [phone]   Sue Antony Daughter [phone]  [phone]   Elva Cardoza Sister In-Law   [phone]     We appreciate the invitation to be involved in this patient's care.  We will continue to follow throughout this hospitalization.  Please do not hesitate to reach our on call provider through our clinic answering service at [phone] should you have acute symptom control concerns.    History of stroke  R frontal subacute infarct, noted on imaging on 04/21/25   Vasogenic brain edema (HCC)  2/2 to metastatic adenocarcinoma  Maintained on Decadron 2 mg BID PTA    History of Present Illness   HPI: Ayla Nye is a 74 y.o. year old female w/ a PMHx of Stage IV adenocarcinoma on chemotherapy w/ metastasis to brain s/p radiation, prior recent admission for LUE weakness to which she was found to have a R MCA frontal lobe stroke, HTN, hx of PE, hypothyroidism, and hypertension essential thrombocytosis who presented to the [facility] ED on 5/12/25 s/p fall. Reportedly, patient lost balance while throwing something away. Hit back of head, no LOC, but she was unable to get up. At baseline requires assistance to get up from bed due to weakness. She was on the ground for approximately 15 minutes. Also w/ reports of worsening LUE and LLE weakness. Patient is well known to the palliative team. Follows w/ the Home Palliative Program. Due to stroke-like symptoms and  "concern for it possibly being related to increasing metastatic burden, palliative care was consulted.     Chart reviewed prior to visit, CHERRIE  Met w/ patient at bedside w/ family member, Elva, present  Patient stated that she is doing well, hoping that she will get her MRI soon to see if there is disease progression  Reports that she has been having significant weakness on her left side and her LLE has significantly worsened   Having pain in neck and back, discussed treatment options. Patient stated, \"you can do whatever you think is best for me.\" Patient prefers to take Tylenol and agreeable to ATC.   Answered all questions/concerns at this time     Review of Systems   Musculoskeletal:  Positive for back pain, neck pain and neck stiffness.   All other systems reviewed and are negative.    Objective :  Temp:  [97.9 °F (36.6 °C)-98.5 °F (36.9 °C)] 98.5 °F (36.9 °C)  HR:  [74-99] 96  BP: (128-172)/() 157/91  Resp:  [16-20] 17  SpO2:  [93 %-97 %] 94 %  O2 Device: None (Room air)    Physical Exam  Constitutional:       General: She is awake. She is not in acute distress.  HENT:      Head: Normocephalic and atraumatic.      Mouth/Throat:      Mouth: Mucous membranes are moist.   Eyes:      Pupils: Pupils are equal, round, and reactive to light.   Cardiovascular:      Rate and Rhythm: Normal rate.   Pulmonary:      Effort: Pulmonary effort is normal. No respiratory distress.   Skin:     General: Skin is warm and dry.      Coloration: Skin is pale.   Neurological:      General: No focal deficit present.      Mental Status: She is alert.          Lab Results: I have reviewed the following results:  Lab Results   Component Value Date/Time    SODIUM 139 05/13/2025 04:36 AM    K 4.5 05/13/2025 04:36 AM    BUN 24 05/13/2025 04:36 AM    CREATININE 0.88 05/13/2025 04:36 AM    GLUC 112 05/13/2025 04:36 AM    CALCIUM 9.2 05/13/2025 04:36 AM    AST 9 (L) 05/12/2025 02:23 PM    ALT 10 05/12/2025 02:23 PM    ALB 4.1 05/12/2025 " 02:23 PM    TP 7.1 05/12/2025 02:23 PM    EGFR 64 05/13/2025 04:36 AM     Lab Results   Component Value Date/Time    HGB 9.7 (L) 05/13/2025 04:36 AM    WBC 15.13 (H) 05/13/2025 04:36 AM     05/13/2025 04:36 AM    INR 0.94 05/05/2025 10:44 AM    PTT 25 05/05/2025 10:44 AM     Lab Results   Component Value Date/Time    IVQ7IIKBHHWV 0.408 (L) 05/12/2025 02:23 PM     Administrative Statements   I have spent a total time of 50 minutes in caring for this patient on the day of the visit/encounter including Risks and benefits of tx options, Instructions for management, Patient and family education, Risk factor reductions, Counseling / Coordination of care, Documenting in the medical record, Reviewing/placing orders in the medical record (including tests, medications, and/or procedures), and Obtaining or reviewing history  .    Diana Neal

## 2025-05-13 NOTE — ASSESSMENT & PLAN NOTE
On chemotherapy, last chemotherapy was approximately 6-8 weeks back-carboplatin and paclitaxel  Follows up with Dr. Castro and radiation oncology  S/p radiation therapy from July to September, was on Keytruda which was stopped secondary to pneumonitis, on tapering dose of Decadron,   Has had stereotactic radiation to brain twice-in August 2024 in March 2025.    Per hemonc:     First line metastatic treatment     Carboplatin AUC 5 Pemetrexed 500 mg/m2 and Pembrolizumab 200 mg IV every 3 weeks x 4 cycles     C1 held due to hospital admission  C1 held due to radiation and concurrent high-dose steroid use     C1 now 10/7/2024  C2 10/28/2024-held due to admission     C2 11/14/2024- completed      No treatment since 11/14/2024 due to multiple admissions/toxicities, PCP pneumonia, PE on Xarelto      Second Line metastatic single agent Taoxtere 75 mg/m2     C1 4/4/2025     C2 held 4/24/2025-will decrease to 60 mg/m2 due to toxicities     C2 will be given 5/5/2025 at 60 mg/m2 due to toxicities/potential tolerance     C2 deferred following hospital admission, not yet administered

## 2025-05-13 NOTE — ASSESSMENT & PLAN NOTE
"Presented to the ED s/p fall   Recent admission on 5/5 and discharged on 5/7 for SLS and found to have infarct in R MCA territory  CT CAP with contrast:  \"Evidence of disease progression with several new and enlarging lesions, new lesions in right lower lobe, right thigh intramuscular mass (R thigh mass contrast enhancing, possible hematoma however not superficial on imaging). Redemonstrated heterogeneous right renal mass A/W urothelial enhancement, concerning for metastatic lesion with probable involvement of the collecting system.\"  CTA head and neck:  \"Redemonstrated vasogenic edema left parietal vertex. No hemorrhage identified.  No significant carotid or vertebral artery stenosis, no focal intracranial stenosis or aneurysm.\"  Low concern for infectious or metabolic etiology, no acute traumatic injury. Likely related to increasing burden of metastasis or recrudescence  MRI pending   "

## 2025-05-13 NOTE — ASSESSMENT & PLAN NOTE
On chemotherapy, last chemotherapy was approximately 6-8 weeks ago  On carboplatin and paclitaxel  Follows up with Dr. Castro and radiation oncology  S/p radiation therapy from July to September, was on Keytruda which was stopped secondary to pneumonitis, on tapering dose of Decadron,   Has had stereotactic radiation to brain twice-in August 2024 and March 2025

## 2025-05-13 NOTE — CASE MANAGEMENT
Case Management Progress Note    Patient name Ayla Nye  Location S /S -01 MRN [id number]  : 1950 Date 2025       LOS (days): 1  Geometric Mean LOS (GMLOS) (days): 4.8  Days to GMLOS:3.8        OBJECTIVE:        Current admission status: Inpatient  Preferred Pharmacy:   [id number] - [place], [place] - [address] [place] [address]  [facility] [place] [address]  Phone: [phone] Fax: [phone]    [facility] [place] - [address] [facility]  [address] [place] [address]  Phone: [phone] Fax: [phone]    Primary Care Provider: Rafy Angelo MD    Primary Insurance: MEDICARE  Secondary Insurance: AARP    PROGRESS NOTE:  Spoke with pt's daughter Juliana. Updated daughter that there are orders for PT/OT and pt is pending eval to determine if STR is needed. Daughter expressed pt is much weaker this admission and had a fall at home and expressed need for pt needing rehab. Informed once therapy evaluates pt and STR is determined case management will assist with finding rehab placement.

## 2025-05-14 LAB
ANISOCYTOSIS BLD QL SMEAR: PRESENT
ATRIAL RATE: 87 BPM
BASOPHILS # BLD MANUAL: 0 THOUSAND/UL (ref 0–0.1)
BASOPHILS NFR MAR MANUAL: 0 % (ref 0–1)
DACRYOCYTES BLD QL SMEAR: PRESENT
EOSINOPHIL # BLD MANUAL: 0.13 THOUSAND/UL (ref 0–0.4)
EOSINOPHIL NFR BLD MANUAL: 1 % (ref 0–6)
ERYTHROCYTE [DISTWIDTH] IN BLOOD BY AUTOMATED COUNT: 16.8 % (ref 11.6–15.1)
GLUCOSE SERPL-MCNC: 163 MG/DL (ref 65–140)
GLUCOSE SERPL-MCNC: 95 MG/DL (ref 65–140)
HCT VFR BLD AUTO: 30.4 % (ref 34.8–46.1)
HGB BLD-MCNC: 9.3 G/DL (ref 11.5–15.4)
LG PLATELETS BLD QL SMEAR: PRESENT
LYMPHOCYTES # BLD AUTO: 0.13 THOUSAND/UL (ref 0.6–4.47)
LYMPHOCYTES # BLD AUTO: 1 % (ref 14–44)
MCH RBC QN AUTO: 28.2 PG (ref 26.8–34.3)
MCHC RBC AUTO-ENTMCNC: 30.6 G/DL (ref 31.4–37.4)
MCV RBC AUTO: 92 FL (ref 82–98)
MICROCYTES BLD QL AUTO: PRESENT
MONOCYTES # BLD AUTO: 0.39 THOUSAND/UL (ref 0–1.22)
MONOCYTES NFR BLD: 3 % (ref 4–12)
MYELOCYTE ABSOLUTE CT: 0.52 THOUSAND/UL (ref 0–0.1)
MYELOCYTES NFR BLD MANUAL: 4 % (ref 0–1)
NEUTROPHILS # BLD MANUAL: 11.76 THOUSAND/UL (ref 1.85–7.62)
NEUTS SEG NFR BLD AUTO: 91 % (ref 43–75)
OVALOCYTES BLD QL SMEAR: PRESENT
P AXIS: 51 DEGREES
PLATELET # BLD AUTO: 323 THOUSANDS/UL (ref 149–390)
PLATELET BLD QL SMEAR: ABNORMAL
PMV BLD AUTO: 9.3 FL (ref 8.9–12.7)
POIKILOCYTOSIS BLD QL SMEAR: PRESENT
POLYCHROMASIA BLD QL SMEAR: PRESENT
PR INTERVAL: 132 MS
QRS AXIS: 23 DEGREES
QRSD INTERVAL: 64 MS
QT INTERVAL: 354 MS
QTC INTERVAL: 425 MS
RBC # BLD AUTO: 3.3 MILLION/UL (ref 3.81–5.12)
RBC MORPH BLD: PRESENT
SPHEROCYTES BLD QL SMEAR: PRESENT
T WAVE AXIS: 56 DEGREES
VENTRICULAR RATE: 87 BPM
WBC # BLD AUTO: 12.92 THOUSAND/UL (ref 4.31–10.16)

## 2025-05-14 PROCEDURE — 93010 ELECTROCARDIOGRAM REPORT: CPT | Performed by: INTERNAL MEDICINE

## 2025-05-14 PROCEDURE — 85027 COMPLETE CBC AUTOMATED: CPT

## 2025-05-14 PROCEDURE — 99232 SBSQ HOSP IP/OBS MODERATE 35: CPT

## 2025-05-14 PROCEDURE — 85007 BL SMEAR W/DIFF WBC COUNT: CPT

## 2025-05-14 PROCEDURE — 99223 1ST HOSP IP/OBS HIGH 75: CPT | Performed by: INTERNAL MEDICINE

## 2025-05-14 PROCEDURE — 97116 GAIT TRAINING THERAPY: CPT

## 2025-05-14 PROCEDURE — 99233 SBSQ HOSP IP/OBS HIGH 50: CPT

## 2025-05-14 PROCEDURE — 82948 REAGENT STRIP/BLOOD GLUCOSE: CPT

## 2025-05-14 PROCEDURE — 97167 OT EVAL HIGH COMPLEX 60 MIN: CPT

## 2025-05-14 PROCEDURE — 97163 PT EVAL HIGH COMPLEX 45 MIN: CPT

## 2025-05-14 RX ADMIN — SENNOSIDES 8.6 MG: 8.6 TABLET, FILM COATED ORAL at 09:01

## 2025-05-14 RX ADMIN — ACETAMINOPHEN 975 MG: 325 TABLET, FILM COATED ORAL at 13:17

## 2025-05-14 RX ADMIN — DEXAMETHASONE 2 MG: 2 TABLET ORAL at 21:19

## 2025-05-14 RX ADMIN — AMLODIPINE BESYLATE 5 MG: 5 TABLET ORAL at 09:01

## 2025-05-14 RX ADMIN — Medication 3 MG: at 21:19

## 2025-05-14 RX ADMIN — ASPIRIN 81 MG: 81 TABLET, CHEWABLE ORAL at 09:01

## 2025-05-14 RX ADMIN — CYANOCOBALAMIN TAB 500 MCG 1000 MCG: 500 TAB at 09:01

## 2025-05-14 RX ADMIN — ATORVASTATIN CALCIUM 40 MG: 40 TABLET, FILM COATED ORAL at 18:08

## 2025-05-14 RX ADMIN — ACETAMINOPHEN 975 MG: 325 TABLET, FILM COATED ORAL at 18:08

## 2025-05-14 RX ADMIN — DEXAMETHASONE 2 MG: 2 TABLET ORAL at 13:17

## 2025-05-14 RX ADMIN — LEVOTHYROXINE SODIUM 50 MCG: 0.05 TABLET ORAL at 05:19

## 2025-05-14 RX ADMIN — LEVETIRACETAM 1000 MG: 500 TABLET, FILM COATED ORAL at 09:01

## 2025-05-14 RX ADMIN — GABAPENTIN 200 MG: 100 CAPSULE ORAL at 09:01

## 2025-05-14 RX ADMIN — ACETAMINOPHEN 975 MG: 325 TABLET, FILM COATED ORAL at 05:19

## 2025-05-14 RX ADMIN — QUETIAPINE FUMARATE 12.5 MG: 25 TABLET ORAL at 21:19

## 2025-05-14 RX ADMIN — DICLOFENAC SODIUM 2 G: 10 GEL TOPICAL at 18:22

## 2025-05-14 RX ADMIN — PANTOPRAZOLE SODIUM 40 MG: 40 TABLET, DELAYED RELEASE ORAL at 05:19

## 2025-05-14 RX ADMIN — LEVETIRACETAM 1000 MG: 500 TABLET, FILM COATED ORAL at 21:19

## 2025-05-14 RX ADMIN — LIDOCAINE 1 PATCH: 50 PATCH CUTANEOUS at 09:01

## 2025-05-14 RX ADMIN — SULFAMETHOXAZOLE AND TRIMETHOPRIM 1 TABLET: 800; 160 TABLET ORAL at 09:00

## 2025-05-14 RX ADMIN — DICLOFENAC SODIUM 2 G: 10 GEL TOPICAL at 13:17

## 2025-05-14 RX ADMIN — METHOCARBAMOL 250 MG: 500 TABLET ORAL at 09:05

## 2025-05-14 RX ADMIN — METHOCARBAMOL 250 MG: 500 TABLET ORAL at 18:14

## 2025-05-14 RX ADMIN — DEXAMETHASONE 2 MG: 2 TABLET ORAL at 05:19

## 2025-05-14 RX ADMIN — GABAPENTIN 300 MG: 300 CAPSULE ORAL at 21:19

## 2025-05-14 NOTE — OCCUPATIONAL THERAPY NOTE
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 2023    Due to have durgery June 2024    Essential thrombocytosis (HCC)     controlled with medication    History of transfusion     Hyperlipidemia 2015    Hypertension 2011    Mass in chest     Nodular goiter     Osteoporosis     Peripheral neuropathy     Pneumonia Oct 16, 2023    Also  Feb 2024    Psoriasis     SIRS (systemic inflammatory response syndrome) (HCC) 06/22/2024     Past Surgical History  Past Surgical History:   Procedure Laterality Date    APPENDECTOMY  1954    BREAST CYST EXCISION Right     COLONOSCOPY  10/31/2018    DXA PROCEDURE (HISTORICAL)  04/21/2017    IR BIOPSY OTHER  09/12/2024    IR LUMBAR PUNCTURE  09/12/2024    MAMMO (HISTORICAL)      8-24-18    MAMMO NEEDLE LOCALIZATION RIGHT (ALL INC) Right 07/13/2009    SKIN LESION EXCISION      bridge of nose         05/14/25 0946   OT Last Visit   OT Visit Date 05/14/25   Note Type   Note type Evaluation   Additional Comments Pt seen w/ PT to increase safety, decrease fall risk, and maximize functional/occupational performance 2* medical complexity which is a regression from pt's functional baseline.   Pain Assessment   Pain Assessment Tool 0-10   Pain Score  No Pain   Hospital Pain Intervention(s) Repositioned;Ambulation/increased activity   Restrictions/Precautions   Weight Bearing Precautions Per Order No   Other Precautions Chair Alarm;Bed Alarm;Multiple lines;Fall Risk;Pain   Home Living   Type of Home House  (2  with 6 HARRY)   Home Layout Two level;Bed/bath upstairs;Performs ADLs on one level;Access   Bathroom Shower/Tub Tub/shower unit  (BSC on first floor; bathroom only on 2nd floor)   Bathroom Toilet Raised  (Toilet raiser over toilet)   Bathroom Equipment Shower chair;Commode   Bathroom Accessibility Accessible   Home Equipment Walker;Cane;Other (Comment)  (Standard walker and rolling walker (reporting they just ordered rolling walker and it was delivered))   Additional Comments Pt reports living alone in a 2  with 6 HARRY with full flight of stairs to access 2nd floor; no bathroom on first floor, reporting currently using BSC on first floor; typically does not use DME for functional mobility, reporting that she holds onto furniture, but has RW and SPC; reporting that when she navigates the stairs, she goes down on her bottom   Prior Function   Level of [place] Needs assistance with ADLs;Needs assistance with functional mobility;Needs assistance with IADLS   Lives With (S)  Alone   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;Home health;Personal care attendant  (Pt reporting that she was supposed to have home health OT/PT come in today however she is currently admitted; has home health aide that comes 6 days/week for 5 hours)   IADLs Family/Friend/Other provides transportation;Family/Friend/Other provides meals;Family/Friend/Other provides medication management   Falls in the last 6 months (S)  1 to 4  (Pt reporting 2 falls however daughter reporting that pt has additional falls)   Vocational Retired   Comments (-) driving; pt reporting that she has home health aides that come 6 days/week for 5 hours in which they assist with IADLs however more recently, she has been  "requiring increased assistance with ADLs; reports that when she showers, the aide will assist getting in/out of the tub, but will sit outside the tub then for bathing   Lifestyle   Autonomy PTA, pt has recently required increased assistance with ADLs, has assistance with IADLs, and uses no DME for functional mobility   Reciprocal Relationships Lives alone; very supportive family (2 daughters and ex- who assist with functional needs when able); home health aide for 6 days/week for 5 hours; was supposed to have home health OT/PT however unable to be evaluated as she is currently admitted   Service to Others Retired, previously working for [employer]   Intrinsic Gratification Enjoys watching TV, her independence, and family   General   Family/Caregiver Present Yes   Additional General Comments Pt's daughter and ex- present, very supportive and interactive throughout session   Subjective   Subjective \"Normally I can dress myself, but coming in, my aide had to help me more.\"   ADL   Where Assessed Edge of bed   Eating Assistance 5  Supervision/Setup   Grooming Assistance 4  Minimal Assistance   UB Bathing Assistance 4  Minimal Assistance   LB Bathing Assistance 3  Moderate Assistance   UB Dressing Assistance 4  Minimal Assistance   LB Dressing Assistance 3  Moderate Assistance   Toileting Assistance  4  Minimal Assistance   Functional Assistance 4  Minimal Assistance   Bed Mobility   Supine to Sit Unable to assess   Sit to Supine Unable to assess   Additional Comments Upon arrival, pt found in bathroom; @ end of session, pt left sitting upright in recliner with family present in room and chair alarm activated   Transfers   Sit to Stand 4  Minimal assistance   Additional items Assist x 1;Increased time required;Verbal cues   Stand to Sit 4  Minimal assistance   Additional items Assist x 1;Increased time required;Verbal cues   Toilet transfer 4  Minimal assistance   Additional items Assist x 1;Verbal " cues;Standard toilet   Additional Comments w/ varying HHA vs RW for increased support/safety however noted pt with decreased  strength for L hand, unable to safely /manipulate RW   Functional Mobility   Functional Mobility 4  Minimal assistance   Additional Comments Pt performing household functional mobility distances <> bathroom + additional household functional mobility distances with Min A x1 w/ varying HHA vs RW for safety and support   Additional items Rolling walker   Balance   Static Sitting Fair +   Dynamic Sitting Fair   Static Standing Fair -   Dynamic Standing Poor +   Ambulatory Poor +   Activity Tolerance   Activity Tolerance Patient tolerated treatment well;Patient limited by fatigue   Medical Staff Made Aware GUSTAVO Wang   Nurse Made Aware RN cleared   RUE Assessment   RUE Assessment WFL   LUE Assessment   LUE Assessment (S)  X  (Grossly 2-/5 MMT, decreased AROM shoulder flexion, able to lift approx 60 degrees)   Hand Function   Gross Motor Coordination Impaired   Fine Motor Coordination Impaired   Hand Function Comments LUE > RUE; decreased L hand strength/; poor coordination/dexterity in L hand, unable to reach and  hair brush and bring to head to brush hair with LUE   Sensation   Light Touch No apparent deficits   Vision-Basic Assessment   Current Vision Wears glasses all the time   Visual History Cataracts   Vision - Complex Assessment   Ocular Range of Motion Intact   Additional Comments Reporting no new changes in vision   Cognition   Overall Cognitive Status WFL   Arousal/Participation Alert;Responsive;Arousable;Cooperative   Attention Within functional limits   Orientation Level Oriented X4   Memory Decreased recall of precautions;Decreased recall of recent events   Following Commands Follows one step commands without difficulty   Comments Pt very pleasant and cooperative; per family report, pt reporting some inconsistent information (falls, assist level) however able to follow  all commands throughout session; very motivated and agreeable to participate in therapy; values her independence   Assessment   Limitation Decreased ADL status;Decreased UE ROM;Decreased UE strength;Decreased Safe judgement during ADL;Decreased endurance;Decreased fine motor control;Decreased self-care trans;Decreased high-level ADLs   Prognosis Fair   Assessment Pt is a 73 yo female who [facility] s/p fall with worsening LUE/LLE weakness in which pt dx w/ stroke-like symptoms. Pt  has a past medical history of DILLON (acute kidney injury) (HCC) (02/14/2025), Allergic (2022), Cancer (HCC), Cancer (HCC), Cancer (HCC), Cataract (Nov. 2023), Essential thrombocytosis ([facility]), History of transfusion, Hyperlipidemia (2015), Hypertension (2011), Mass in chest, Nodular goiter, Osteoporosis, Peripheral neuropathy, Pneumonia (Oct 16, 2023), Psoriasis, and SIRS (systemic inflammatory response syndrome) ([facility]) (06/22/2024). Pt with active OT orders in which OT consulted to assess pt's functional status and occupational performance to determine safe d/c needs. Pt seen with PT to increase safety, decrease fall risk, and maximize functional/occupational performance 2* medical complexity which is a regression from pt's functional baseline. Pt lives alone in a 2  with 6 HARRY. PTA, pt has been requiring increased assistance with ADLs/IADLs and uses no DME for functional mobility. (-) driving. Currently, pt performing functional transfers/mobility w/ Min A x1 w/ RW vs HHA, UB ADLs w/ Min A, and LB ADLs w/ Mod A. Pt demonstrates the following limitations/impairments which impact the pt's ability to engage in valued occupations: functional ROM, coordination, fine motor skills, dexterity,  strength, balance, endurance/activity tolerance, standing tolerance, functional reach, postural/trunk control, strength, safety awareness, and pain. From an OT standpoint, recommend discharge w/ Level 1 resources once medically  stable. The patient's raw score on the AM-PAC Daily Activity Inpatient Short Form is 16. A raw score of less than 19 suggests the patient may benefit from discharge to post-acute rehabilitation services. Please refer to the recommendation of the Occupational Therapist for safe discharge planning.  Pt would benefit from skilled OT services 3-5x/wk to address acute care needs and underlying performance skills to promote safety, decrease fall risk, and enhance occupational performance to return to [facility]. Goals to be met within the next 10-14 days.   Goals   Patient Goals To maintain independence   LTG Time Frame 10-14   Long Term Goal #1 See OT goals listed below   Plan   Treatment Interventions ADL retraining;Functional transfer training;UE strengthening/ROM;Endurance training;Cognitive reorientation;Patient/family training;Equipment evaluation/education;Neuromuscular reeducation;Fine motor coordination activities;Compensatory technique education;Continued evaluation;Energy conservation;Activityengagement   Goal Expiration Date 05/28/25   OT Frequency 3-5x/wk   Discharge Recommendation   Rehab Resource Intensity Level, OT I (Maximum Resource Intensity)   AM-PAC Daily Activity Inpatient   Lower Body Dressing 2   Bathing 3   Toileting 2   Upper Body Dressing 3   Grooming 3   Eating 3   Daily Activity Raw Score 16   Daily Activity Standardized Score (Calc for Raw Score >=11) 35.96   AM-PAC Applied Cognition Inpatient   Following a Speech/Presentation 3   Understanding Ordinary Conversation 4   Taking Medications 3   Remembering Where Things Are Placed or Put Away 4   Remembering List of 4-5 Errands 4   Taking Care of Complicated Tasks 3   Applied Cognition Raw Score 21   Applied Cognition Standardized Score 44.3   End of Consult   Education Provided Yes;Family or social support of family present for education by provider   Patient Position at End of Consult Bedside chair;Bed/Chair alarm activated;All needs within reach    Nurse Communication Nurse aware of consult     OT GOALS:    Pt will improve functional mobility during ADL/IADL/leisure tasks with Mod I using AE/DME prn.    Pt will improve activity tolerance/functional endurance during ADL/IADL/leisure tasks for at least 20 minutes to improve occupational performance and engagement in valued occupations using AE/DME prn.    Pt will engage in ongoing functional/formal cognitive assessments to assist with safe d/c planning and increase safety during functional tasks.    Pt will improve dynamic standing balance for at least 15 minutes with Mod I during functional tasks to decrease fall risk and improve independence and engagement in ADL/IADL/leisure activities.    Pt will follow 100% of multi-step commands in ADL/IADL/leisure activities to improve functional cognition used in functional daily routines.    Pt will complete functional transfers on and off all surfaces used in daily routines with Mod I for safety to maximize functional/occupational performance.    Pt will complete all bed mobility tasks with Mod I to serve as a prerequisite for EOB/OOB ADL/IADL/leisure tasks, optimize positioning/comfort, and increase functional independence.    Pt will independently demonstrate good carryover of safety precautions and education/training during ADL/IADL/leisure tasks with energy conservation techniques s/p skilled instruction without verbal cues.    Pt will complete UB ADL tasks with S using AE/DME prn to increase functional independence in ADL/IADL/leisure tasks.    Pt will complete LB ADL tasks with Min A using AE/DME prn to increase functional independence in ADL/IADL/leisure tasks.     Pt will complete toileting tasks with Min A and good hygiene/thoroughness using AE/DME prn to increase functional independence.    Pt will independently identify and utilize 2-3 positive coping strategies to enhance overall wellbeing and engagement in valued occupations.    Madiha Bedolla MS,  OTR/L

## 2025-05-14 NOTE — ASSESSMENT & PLAN NOTE
Current Pain Regimen:  PO hydromorphone 2-4 mg Q4hrs PRN for moderate/severe pain  IV hydromorphone 0.2 mg Q4hrs PRN for BTP  ATC Tylenol 975 mg Q6hrs  Continue Robaxin 250 mg Q6hrs PRN for muscle spasms<--- feels this has been helpful  Gabapentin 200 mg daily and 300 daily at bedtime   Narcan for safety   Other Symptom Management:  PO Zofran 4 mg Q6hrs PRN     OME's in the past 24 hours: 0

## 2025-05-14 NOTE — ASSESSMENT & PLAN NOTE
CC: Fall at home on aspirin.  Recent admission on 5/5 and discharged on 5/7 for SLS and found to have infarct in R MCA territory.  Continue with aspirin and statin, Xarelto discontinued in April 2025 after new foci of hemorrhage within the left occipital metastasis.  Vasogenic brain edema, maintained on dexamethasone.  CT CAP with contrast:  Evidence of disease progression with several new and enlarging lesions, new lesions in right lower lobe, right thigh intramuscular mass (R thigh mass contrast enhancing, possible hematoma however not superficial on imaging).  Redemonstrated heterogeneous right renal mass A/W urothelial enhancement, concerning for metastatic lesion with probable involvement of the collecting system.  CTA head and neck:  Redemonstrated vasogenic edema left parietal vertex.  No hemorrhage identified.  No significant carotid or vertebral artery stenosis, no focal intracranial stenosis or aneurysm.  Positive labs:  Negative labs: UA, troponin, CBC (baseline leukocytosis, ACD), CMP, LDL, A1c, glucose on admission, magnesium.  Low concern for infectious or metabolic etiology, no acute traumatic injury noted on imaging.  Likely related to increasing burden of metastasis or recrudescence.  5/13/25 - MRI brain wo contrast. - stable enhancing lesions in left occipital and parietal lobe surrounding vasogenic edema. No new enhancing lesions   TSH - 0.408, Free T4 - normal    Plan:  Palliative care consult:  Continuing to follow.  Neurology consult:  MRI stable, cause of her weakness may be due to existing metastatic disease of brain or due to metabolic/infectious disturbance which do not appear to be a factor.   Continue home aspirin, defer to oncology for steroid regimen, f/u outpatient with neuro in 4/6 weeks.  Oncology consult:  Pending final recs.  PT/OT evaluated - Level 1 - Pt wants to go to rehab and CM will facilitate placement

## 2025-05-14 NOTE — CONSULTS
Consultation - Oncology-Medical   Name: Ayla Nye 74 y.o. 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  See HPI for full oncology treatment history, but is most recently s/p C1 (4/3/2025) of monthly docetaxel with C2 held for hospitalizations.  MRI brain shows stable lesions, vasogenic edema, and subacute CVA lesions.  CT CAP is significant for disease progression.  Oncology was consulted for recommendations for dexamethasone and inpatient chemotherapy.    Recommend not tapering below dexamethasone 6 mg daily at this time.  Okay to change from 2 mg every 8 hours to 3 mg every 12 hours when she discharges for rehab.  Hold cycle 2 of reduced-dose docetaxel until outpatient follow up after rehab.  No barriers to discharge from an oncology perspective.  Patient is newly agreeable to rehab.  Recommend outpatient follow-up with Dr. Castro after rehab.  Vasogenic brain edema (HCC)  See above  Stroke-like symptoms  See above  Cancer associated pain  See above    I have discussed the above management plan in detail  with the primary service.     History of Present Illness   Ayla Nye is a 74 y.o. 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  She was attempting to throw something away and fell backwards and hit her head without LOC, and was unable to get up for 15 minutes.  She has been on a dexamethasone taper most recently at 4 mg daily with plans to go to 2 mg daily.  Her dexamethasone 4 symptomatic cerebral edema has been tapered and escalated multiple times over the last several months due to symptoms and multiple hospitalizations.    Vitals are stable and roughly WNL.  Labs show persistent leukocytosis of 12.9, Hb 9.3, , and roughly normal CMP.  MRI brain showed stable left superior occipital lobe and left parietal lobe lesions with surrounding vasogenic edema, no new lesions, and stable right posterior frontal lobe sequela of subacute ischemia.  CTA head/neck was negative for carotid or vertebral arterial stenosis.  CT CAP was significant for disease progression including several new and enlarging lesions including progression of a likely metastatic right renal mass concerning for probable involvement of collecting system.    In terms of her oncology history, her adenocarcinoma of the right lung was diagnosed as stage IIIB in Mar 2024 and despite treatment as below progressed to stage IV with brain metastasis.  Her SCC of the anus was diagnosed Sep 2024 and treated as below.  She has had several failed attempts at steroid taper due to recurrence of symptomatic cerebral edema.  She follows with Dr. Castro.    Concurrent  chemo/RT with carboplatin and paclitaxel (5/23/2024 - 7/5/2024)  SRS to five new brain metastases (8/8/2024)  Palliative RT to right glueteal mass (Oct 2024)  SRS to left frontal lobe lesion (1/16/2025)  Carboplatin, pemetrexed, and pembrolizumab (10/7/2024 - 2/13/2025), held pembrolizumab after 11/14/2024 due to PCP PNA, added bevacizumab x1 dose 2/13/2025  Dose-reduced docetaxel (4/3/2025 - present), C2 planned 5/5/2025 was held    Oncology was consulted for recommendations for dexamethasone and inpatient chemotherapy.    Review of Systems  ROS was performed and is negative except as indicated in HPI.    Historical Information   Medical History Review: I have reviewed the patient's PMH, PSH, Social History, Family History, Meds, and Allergies     Historical Information   Past Medical History:   Diagnosis Date    DILLON (acute kidney injury) (HCC) 02/14/2025    Allergic 2022    Allergic to neomiacin and cephalexin    Cancer (HCC)     lung cancer    Cancer (HCC)     mets to brain    Cancer (HCC)     perianal mass    Cataract Nov. 2023    Due to have durgery June 2024    Essential thrombocytosis (HCC)     controlled with medication    History of transfusion     Hyperlipidemia 2015    Hypertension 2011    Mass in chest     Nodular goiter     Osteoporosis     Peripheral neuropathy     Pneumonia Oct 16, 2023    Also  Feb 2024    Psoriasis     SIRS (systemic inflammatory response syndrome) (HCC) 06/22/2024     Past Surgical History:   Procedure Laterality Date    APPENDECTOMY  1954    BREAST CYST EXCISION Right     COLONOSCOPY  10/31/2018    DXA PROCEDURE (HISTORICAL)  04/21/2017    IR BIOPSY OTHER  09/12/2024    IR LUMBAR PUNCTURE  09/12/2024    MAMMO (HISTORICAL)      8-24-18    MAMMO NEEDLE LOCALIZATION RIGHT (ALL INC) Right 07/13/2009    SKIN LESION EXCISION      bridge of nose     Social History     Tobacco Use    Smoking status: Former     Current packs/day: 1.00     Average packs/day: 1 pack/day for 59.4 years (59.4  ttl pk-yrs)     Types: Cigarettes     Start date:      Passive exposure: Past    Smokeless tobacco: Never    Tobacco comments:     Quit    Vaping Use    Vaping status: Never Used   Substance and Sexual Activity    Alcohol use: Not Currently     Alcohol/week: 5.0 standard drinks of alcohol     Types: 5 Glasses of wine per week    Drug use: Never    Sexual activity: Not Currently     Partners: Male     Birth control/protection: Post-menopausal     E-Cigarette/Vaping    E-Cigarette Use Never User      E-Cigarette/Vaping Substances    Nicotine No     THC No     CBD No     Flavoring No     Other No     Unknown No      Family History   Problem Relation Age of Onset    Stroke Mother     Depression Mother             Hypertension Mother     Hearing loss Mother     Tuberculosis Father     Cancer Brother         lung    Tuberculosis Brother     Coronary artery disease Brother     Hypertension Brother     Heart disease Brother     No Known Problems Daughter     No Known Problems Daughter     No Known Problems Maternal Grandmother     No Known Problems Maternal Grandfather     No Known Problems Paternal Grandmother     No Known Problems Paternal Grandfather     No Known Problems Maternal Aunt     No Known Problems Maternal Aunt     No Known Problems Maternal Aunt     No Known Problems Maternal Aunt     No Known Problems Paternal Aunt     Cancer Brother         Throat cancer     Social History     Tobacco Use    Smoking status: Former     Current packs/day: 1.00     Average packs/day: 1 pack/day for 59.4 years (59.4 ttl pk-yrs)     Types: Cigarettes     Start date:      Passive exposure: Past    Smokeless tobacco: Never    Tobacco comments:     Quit    Vaping Use    Vaping status: Never Used   Substance and Sexual Activity    Alcohol use: Not Currently     Alcohol/week: 5.0 standard drinks of alcohol     Types: 5 Glasses of wine per week    Drug use: Never    Sexual activity: Not Currently     Partners:  Male     Birth control/protection: Post-menopausal     Prior to Admission Medications   Prescriptions Last Dose Informant Patient Reported? Taking?   HYDROmorphone (DILAUDID) 2 mg tablet 5/11/2025 Bedtime  No Yes   Sig: Take 1 tablet (2 mg total) by mouth every 4 (four) hours as needed for moderate pain Max Daily Amount: 12 mg   QUEtiapine (SEROquel) 25 mg tablet 5/11/2025 Bedtime  No Yes   Sig: Take 0.5 tablets (12.5 mg total) by mouth daily at bedtime   aluminum-magnesium hydroxide-simethicone (MAALOX) 7546-4592-652 mg/30 mL suspension 5/12/2025 Morning  No Yes   Sig: Take 30 mL by mouth every 4 (four) hours as needed for indigestion or heartburn   amLODIPine (NORVASC) 5 mg tablet 5/12/2025  No Yes   Sig: TAKE 1 TABLET(5 MG) BY MOUTH DAILY   aspirin 81 mg chewable tablet 5/12/2025 Morning  No Yes   Sig: Chew 1 tablet (81 mg total) daily for 29 doses   atorvastatin (LIPITOR) 40 mg tablet 5/11/2025 Evening  No Yes   Sig: Take 1 tablet (40 mg total) by mouth daily with dinner   dexamethasone (DECADRON) 2 mg tablet 5/12/2025 Morning  No Yes   Sig: Take 2 tablets (4 mg total) by mouth every 12 (twelve) hours for 3 days, THEN 1 tablet (2 mg total) every 8 (eight) hours.   gabapentin (NEURONTIN) 100 mg capsule 5/12/2025  No Yes   Sig: TAKE 1 CAPSULE BY MOUTH EVERY MORNING, 1 CAPSULE EVERY AFTERNOON AND 3 CAPSULES EVERY NIGHT AT BEDTIME   Patient taking differently: TAKE 2 CAPSULES BY MOUTH EVERY MORNING AND 3 CAPSULES EVERY NIGHT AT BEDTIME (patient did not change her regimen per doctor)   levETIRAcetam (KEPPRA) 1000 MG tablet 5/12/2025 Morning  No Yes   Sig: TAKE 1 TABLET(1000 MG) BY MOUTH EVERY 12 HOURS   levothyroxine 50 mcg tablet 5/12/2025 Morning  No Yes   Sig: TAKE 1 TABLET(50 MCG) BY MOUTH DAILY EARLY MORNING   melatonin 3 mg 5/11/2025 Bedtime  No Yes   Sig: Take 1 tablet (3 mg total) by mouth daily at bedtime   ondansetron (ZOFRAN) 4 mg tablet More than a month  No No   Sig: Take 1 tablet (4 mg total) by mouth  every 8 (eight) hours as needed for nausea or vomiting   pantoprazole (PROTONIX) 40 mg tablet 5/12/2025 Morning  No Yes   Sig: Take 1 tablet (40 mg total) by mouth daily in the early morning   senna (SENOKOT) 8.6 MG tablet 5/10/2025 Self Yes Yes   Sig: Take 1 tablet by mouth daily Takes one every other day   sulfamethoxazole-trimethoprim (BACTRIM DS) 800-160 mg per tablet 5/12/2025 Morning  No Yes   Sig: Take 1 tablet by mouth 3 (three) times a week Starting 12/24, you can take one tablet Monday, Wednesday, and Fridays.   vitamin B-12 (VITAMIN B-12) 1,000 mcg tablet 5/12/2025 Morning Self No Yes   Sig: Take 1 tablet (1,000 mcg total) by mouth daily      Facility-Administered Medications: None     Doxycycline, Keflex [cephalexin], and Neomycin-polymyxin-dexameth    Oncology History:   Cancer Staging   Metastatic adenocarcinoma (HCC)  Staging form: Lung, AJCC 8th Edition  - Clinical stage from 4/15/2024: Stage IIIB (cT2b, cN3, cM0) - Signed by Rogelio Beebe DO on 4/15/2024  - Clinical: Stage IV (cM1) - Signed by Honey Gamboa MD on 9/23/2024    Oncology History   Metastatic adenocarcinoma (HCC)   2024 Initial Diagnosis    Primary lung adenocarcinoma, right (HCC)     3/26/2024 Biopsy    A-C. Lymph Node, Level 4R :    - Metastatic non-small cell carcinoma, most compatible with a primary lung adenocarcinoma; see note.       D-F. Lymph Node, Level 10R:    - Metastatic non-small cell carcinoma; see note.       G-I. Lymph Node, Level 4L:    - Metastatic non-small cell carcinoma; see note.       4/15/2024 -  Cancer Staged    Staging form: Lung, AJCC 8th Edition  - Clinical stage from 4/15/2024: Stage IIIB (cT2b, cN3, cM0) - Signed by Rogelio Beebe DO on 4/15/2024       5/23/2024 - 7/2/2024 Chemotherapy    alteplase (CATHFLO), 2 mg, Intracatheter, Every 1 Minute as needed, 6 of 6 cycles  CARBOplatin (PARAPLATIN) IVPB (GOG AUC DOSING), 130.4 mg, Intravenous, Once, 6 of 6 cycles  Administration: 130.4 mg  (5/23/2024), 144.8 mg (5/30/2024), 138.4 mg (6/6/2024), 144.8 mg (6/13/2024), 132 mg (6/21/2024), 143.6 mg (7/2/2024)  PACLItaxel (TAXOL) chemo IVPB, 45 mg/m2 = 67.2 mg (90 % of original dose 50 mg/m2), Intravenous, Once, 6 of 6 cycles  Dose modification: 45 mg/m2 (original dose 50 mg/m2, Cycle 1, Reason: Anticipated Tolerance)  Administration: 67.2 mg (5/23/2024), 67.2 mg (5/30/2024), 67.2 mg (6/6/2024), 67.2 mg (6/13/2024), 67.2 mg (6/21/2024), 67.2 mg (7/2/2024)     5/23/2024 - 7/5/2024 Radiation    Treatment:  Course: C1    Plan ID Energy Fractions Dose per Fraction (cGy) Dose Correction (cGy) Total Dose Delivered (cGy) Elapsed Days   R Lung_Hilum 6X 30 / 30 200 0 6,000 43      Treatment dates:  C1: 5/23/2024 - 7/5/2024 8/8/2024 - 8/8/2024 Radiation    SRS 5 PTVs   2000 cGy     9/12/2024 Biopsy    Mass, Superficial perianal mass:  - Squamous cell carcinoma.     Note: The patient's prior lung EBUS sampling shows the tumor to stain for TTF1 and Napsin with absent p40 expression.  The current perianal mass sampling shows diffuse p40 expression with absent TTF1 expression.  This may represent a primary anal/perianal squamous cell carcinoma versus a possible metastatic combined lung tumor (adenocarcinoma and previously unsampled squamous component).  Suggest clinical correlation and appropriate follow-up.         9/23/2024 -  Cancer Staged    Staging form: Lung, AJCC 8th Edition  - Clinical: Stage IV (cM1) - Signed by Honey Gamboa MD on 9/23/2024       10/1/2024 - 10/18/2024 Radiation    Course: C3    Plan ID Energy Fractions Dose per Fraction (cGy) Dose Correction (cGy) Total Dose Delivered (cGy) Elapsed Days   R Gluteus:1 10X/6X 14 / 15 300 0 4,200 17      Treatment Dates:  10/1/2024 - 10/18/2024.       10/7/2024 - 2/13/2025 Chemotherapy    cyanocobalamin, 1,000 mcg, Intramuscular, Once, 2 of 3 cycles  Administration: 1,000 mcg (8/19/2024), 1,000 mcg (1/23/2025)  alteplase (CATHFLO), 2 mg, Intracatheter,  Every 1 Minute as needed, 4 of 6 cycles  palonosetron (ALOXI), 0.25 mg, Intravenous, Once, 2 of 4 cycles  Administration: 0.25 mg (1/23/2025), 0.25 mg (2/13/2025)  fosaprepitant (EMEND) IVPB, 150 mg, Intravenous, Once, 4 of 6 cycles  Administration: 150 mg (10/7/2024), 150 mg (1/23/2025), 150 mg (2/13/2025), 150 mg (11/14/2024)  CARBOplatin (PARAPLATIN) IVPB ([facility] AUC DOSING), 347 mg, Intravenous, Once, 4 of 6 cycles  Administration: 347 mg (10/7/2024), 256.8 mg (1/23/2025), 278.4 mg (2/13/2025), 346 mg (11/14/2024)  bevacizumab (AVASTIN) IVPB, 5 mg/kg = 280 mg (100 % of original dose 5 mg/kg), Intravenous, Once, 1 of 3 cycles  Dose modification: 5 mg/kg (original dose 5 mg/kg, Cycle 4, Reason: Anticipated Tolerance)  Administration: 280 mg (2/13/2025)  pemetrexed (ALIMTA) chemo infusion, 735 mg, Intravenous, Once, 4 of 6 cycles  Dose modification: 300 mg/m2 (original dose 500 mg/m2, Cycle 4, Reason: Anticipated Tolerance)  Administration: 700 mg (10/7/2024), 700 mg (1/23/2025), 468 mg (2/13/2025), 700 mg (11/14/2024)  pembrolizumab (KEYTRUDA) IVPB, 200 mg, Intravenous, Once, 2 of 2 cycles  Administration: 200 mg (11/14/2024), 200 mg (10/7/2024)     1/16/2025 - 1/16/2025 Radiation    Course: C4  Plan ID Energy Fractions Dose per Fraction (cGy) Dose Correction (cGy) Total Dose Delivered (cGy) Elapsed Days   SRS L_Front 6X-FFF 1 / 1 2,000 0 2,000 0      Treatment dates:  C4 SRS: 1/16/2025 - 1/16/2025         4/3/2025 -  Chemotherapy    DOCEtaxel (TAXOTERE) chemo infusion, 75 mg/m2 = 114 mg, 1 of 6 cycles  Dose modification: 60 mg/m2 (original dose 75 mg/m2, Cycle 2, Reason: Anticipated Tolerance)  Administration: 114 mg (4/3/2025)     Metastatic cancer to brain (HCC)   7/29/2024 Initial Diagnosis    Metastatic cancer to brain (HCC)     8/8/2024 - 8/8/2024 Radiation    SRS 5 PTVs   2000 cGy     9/12/2024 Biopsy    Mass, Superficial perianal mass:  - Squamous cell carcinoma.     Note: The patient's prior lung EBUS  sampling shows the tumor to stain for TTF1 and Napsin with absent p40 expression.  The current perianal mass sampling shows diffuse p40 expression with absent TTF1 expression.  This may represent a primary anal/perianal squamous cell carcinoma versus a possible metastatic combined lung tumor (adenocarcinoma and previously unsampled squamous component).  Suggest clinical correlation and appropriate follow-up.         10/1/2024 - 10/18/2024 Radiation    Course: C3    Plan ID Energy Fractions Dose per Fraction (cGy) Dose Correction (cGy) Total Dose Delivered (cGy) Elapsed Days   R Gluteus:1 10X/6X 14 / 15 300 0 4,200 17      Treatment Dates:  10/1/2024 - 10/18/2024.       1/16/2025 - 1/16/2025 Radiation    Course: C4  Plan ID Energy Fractions Dose per Fraction (cGy) Dose Correction (cGy) Total Dose Delivered (cGy) Elapsed Days   SRS L_Front 6X-FFF 1 / 1 2,000 0 2,000 0      Treatment dates:  C4 SRS: 1/16/2025 - 1/16/2025           Current Medications[1]    Objective :  Temp:  [98.3 °F (36.8 °C)] 98.3 °F (36.8 °C)  HR:  [76-89] 76  BP: (126-139)/(79-83) 126/83  Resp:  [16] 16  SpO2:  [92 %-94 %] 94 %  O2 Device: None (Room air)    Physical Exam  General: WDWN, well-appearing, no acute distress  HEENT: increasing facial swelling, PERRLA, moist mucosa, atraumatic  Respiratory: CTAB w/o wheezes, rales, or rhonchi, no increased work of breathing  Cardiovascular: RRR w/o murmurs, 2+ radial and pedal pulses, no b/l LE edema  Abdomen: soft, non-tender, non-distended, no hepatomegaly or splenomegaly  /Rectal: deferred  Musculoskeletal: 1/5 LUE and LLE  Integumentary: warm, dry, no rash or bruising  Neurological: see MSK, normal sensation  Psychiatric: pleasant and cooperative with normal mood, affect, and cognition    Lab Results: I have reviewed the following results:CBC/BMP:   .     05/14/25  0455   WBC 12.92*   HGB 9.3*   HCT 30.4*       , LFTs: No new results in last 24 hours. , PTT/INR:No new results in last 24  hours.   Lab Results   Component Value Date    K 4.5 05/13/2025     05/13/2025    CO2 25 05/13/2025    BUN 24 05/13/2025    CREATININE 0.88 05/13/2025    GLUF 97 03/25/2025    CALCIUM 9.2 05/13/2025    CORRECTEDCA 9.3 04/07/2025    AST 9 (L) 05/12/2025    ALT 10 05/12/2025    ALKPHOS 57 05/12/2025    EGFR 64 05/13/2025     Lab Results   Component Value Date    WBC 12.92 (H) 05/14/2025    HGB 9.3 (L) 05/14/2025    HCT 30.4 (L) 05/14/2025    MCV 92 05/14/2025     05/14/2025     Lab Results   Component Value Date    NEUTROABS 1.50 (L) 02/19/2025     Imaging Results Review: I reviewed radiology reports from this admission including: CTA Head/Neck, CT chest, CT abdomen/pelvis, MRI brain, and Ultrasound(s).  Other Study Results Review: No additional pertinent studies reviewed.    Administrative Statements   I have spent a total time of 65 minutes in caring for this patient on the day of the visit/encounter including Diagnostic results, Prognosis, Risks and benefits of tx options, Instructions for management, Patient and family education, Importance of tx compliance, Risk factor reductions, Impressions, Counseling / Coordination of care, Documenting in the medical record, Reviewing/placing orders in the medical record (including tests, medications, and/or procedures), Obtaining or reviewing history  , and Communicating with other healthcare professionals .    Additional recommendations to follow per attending, Dr. Kelley.    Xu Alanis DO, PGY4  Hematology/Oncology Fellow         [1]   Current Facility-Administered Medications   Medication Dose Route Frequency Provider Last Rate Last Admin    acetaminophen (TYLENOL) tablet 975 mg  975 mg Oral Q6H ZULEYMA Parada   975 mg at 05/14/25 0519    aluminum-magnesium hydroxide-simethicone (MAALOX) oral suspension 30 mL  30 mL Oral Q4H PRN Greg Jarrett MD        amLODIPine (NORVASC) tablet 5 mg  5 mg Oral Daily Greg Jarrett MD   5 mg at 05/14/25 0901     aspirin chewable tablet 81 mg  81 mg Oral Daily Greg Jarrett MD   81 mg at 05/14/25 0901    atorvastatin (LIPITOR) tablet 40 mg  40 mg Oral Daily With Dinner Greg Jarrett MD   40 mg at 05/13/25 1620    cyanocobalamin (VITAMIN B-12) tablet 1,000 mcg  1,000 mcg Oral Daily Greg Jarrett MD   1,000 mcg at 05/14/25 0901    dexamethasone (DECADRON) tablet 2 mg  2 mg Oral Q8H KRISS Greg Jarrett MD   2 mg at 05/14/25 0519    Diclofenac Sodium (VOLTAREN) 1 % topical gel 2 g  2 g Topical 4x Daily Greg Jarrett MD   2 g at 05/13/25 2135    enoxaparin (LOVENOX) subcutaneous injection 40 mg  40 mg Subcutaneous Q24H Greg Jarrett MD        gabapentin (NEURONTIN) capsule 200 mg  200 mg Oral Daily Greg Jarrett MD   200 mg at 05/14/25 0901    And    gabapentin (NEURONTIN) capsule 300 mg  300 mg Oral HS Greg Jarrett MD   300 mg at 05/13/25 2133    HYDROmorphone (DILAUDID) tablet 2 mg  2 mg Oral Q4H PRN ZULEYMA Mandujano        Or    HYDROmorphone (DILAUDID) tablet 4 mg  4 mg Oral Q4H PRN ZULEYMA Mandujano        HYDROmorphone HCl (DILAUDID) injection 0.2 mg  0.2 mg Intravenous Q4H PRN ZULEYMA Mandujano        levETIRAcetam (KEPPRA) tablet 1,000 mg  1,000 mg Oral BID Greg Jarrett MD   1,000 mg at 05/14/25 0901    levothyroxine tablet 50 mcg  50 mcg Oral Early Morning Greg Jarrett MD   50 mcg at 05/14/25 0519    lidocaine (LIDODERM) 5 % patch 1 patch  1 patch Topical Daily Greg Jarrett MD   1 patch at 05/14/25 0901    melatonin tablet 3 mg  3 mg Oral HS Greg Jarrett MD   3 mg at 05/13/25 2135    methocarbamol (ROBAXIN) tablet 250 mg  250 mg Oral Q6H PRN ZULEYMA Mandujano   250 mg at 05/14/25 0905    naloxone (NARCAN) 0.04 mg/mL syringe 0.04 mg  0.04 mg Intravenous Q1MIN PRN ZULEYMA Mandujano        ondansetron (ZOFRAN-ODT) dispersible tablet 4 mg  4 mg Oral Q6H PRN Greg Jarrett MD        pantoprazole (PROTONIX) EC tablet 40 mg  40 mg Oral Daily Before Breakfast Greg Jarrett MD   40 mg at 05/14/25 0519    QUEtiapine  (SEROquel) tablet 12.5 mg  12.5 mg Oral HS Greg Jarrett MD   12.5 mg at 05/13/25 2133    senna (SENOKOT) tablet 8.6 mg  1 tablet Oral Daily Greg Jarrett MD   8.6 mg at 05/14/25 0901    sulfamethoxazole-trimethoprim (BACTRIM DS) 800-160 mg per tablet 1 tablet  1 tablet Oral Once per day on Monday Wednesday Friday Greg Jarrett MD   1 tablet at 05/14/25 0900

## 2025-05-14 NOTE — ASSESSMENT & PLAN NOTE
Palliative diagnoses: Stage IV adenocarcinoma of the lung    Goals of care  Level 3 code status  Disease focused care w/ DNR/DNI limits in place   Concerns introduced include:  Aware of MRI results  Wants to go to rehab to get stronger  Wants to hear what options Dr. Castro has to offer.  Will continue discussions regarding GOC as patient's clinical presentation evolves.  Encouraged follow up with Palliative Medicine on an outpatient basis after discharge for continued symptom management. Our office will contact patient to schedule a hospital follow up.    Social support:  Supportive listening provided  Normalized experience of patient/family  Provided anxiety containment  Provided anticipatory guidance  Encouraged self care  Living situation:  Follows w/ Home Palliative Program     Follow up  Palliative Care will continue to follow and goals of care discussions will be ongoing.    Please reach out via [employer] Secure Chat if questions or concerns arise.    Care Coordination  Reviewed case with [facility]    PDMP Review: I have reviewed the patient's controlled substance dispensing history in the Prescription Drug Monitoring Program in compliance with the [facility] regulations before prescribing any controlled substances.    Decisional apparatus: Patient does have capacity on exam today. If capacity is lost, patient's substitute decision maker would default to daughters by [place] Act 169.    Advance Directive/Living Will, POLST and POA Forms: On file and reviewed, lists Juliana or Sue as medical POAs    ER contacts:  Name Relation Home Work Mobile   Juliana Camarena Daughter [phone]   Sue Antony Daughter [phone]   Elva Cardoza Sister In-Law   [phone]     We appreciate the invitation to be involved in this patient's care.  We will continue to follow throughout this hospitalization.  Please do not hesitate to reach our on call provider through our clinic answering service at [phone]  should you have acute symptom control concerns.

## 2025-05-14 NOTE — PROGRESS NOTES
Progress Note - Hospitalist   Name: Ayla Nye 74 y.o. female I MRN: [id number]  Unit/Bed#: S -01 I Date of Admission: 5/12/2025   Date of Service: 5/14/2025 I Hospital Day: 2    Assessment & Plan  Stroke-like symptoms  Left-sided weakness  CC: Fall at home on aspirin.  Recent admission on 5/5 and discharged on 5/7 for SLS and found to have infarct in R MCA territory.  Continue with aspirin and statin, Xarelto discontinued in April 2025 after new foci of hemorrhage within the left occipital metastasis.  Vasogenic brain edema, maintained on dexamethasone.  CT CAP with contrast:  Evidence of disease progression with several new and enlarging lesions, new lesions in right lower lobe, right thigh intramuscular mass (R thigh mass contrast enhancing, possible hematoma however not superficial on imaging).  Redemonstrated heterogeneous right renal mass A/W urothelial enhancement, concerning for metastatic lesion with probable involvement of the collecting system.  CTA head and neck:  Redemonstrated vasogenic edema left parietal vertex.  No hemorrhage identified.  No significant carotid or vertebral artery stenosis, no focal intracranial stenosis or aneurysm.  Positive labs:  Negative labs: UA, troponin, CBC (baseline leukocytosis, ACD), CMP, LDL, A1c, glucose on admission, magnesium.  Low concern for infectious or metabolic etiology, no acute traumatic injury noted on imaging.  Likely related to increasing burden of metastasis or recrudescence.  5/13/25 - MRI brain wo contrast. - stable enhancing lesions in left occipital and parietal lobe surrounding vasogenic edema. No new enhancing lesions   TSH - 0.408, Free T4 - normal    Plan:  Palliative care consult:  Continuing to follow.  Neurology consult:  MRI stable, cause of her weakness may be due to existing metastatic disease of brain or due to metabolic/infectious disturbance which do not appear to be a factor.   Continue home aspirin, defer to oncology for  steroid regimen, f/u outpatient with neuro in 4/6 weeks.  Oncology consult:  Pending final recs.  PT/OT evaluated - Level 1 - Pt wants to go to rehab and CM will facilitate placement   Vasogenic brain edema (HCC)  2/2 to metastatic adenocarcinoma  Maintained on Decadron 2 mg BID PTA  Per neuro recs, Decadron 10 mg x 1 now, then 4 mg BID x 3 days, then 2 mg TID.  Prior provider discussed with radiation oncology, recommend continue with Decadron taper, follow-up MRI outpatient, follow-up radiation oncology outpatient with Dr. Vuong.  During prior admission, no role for additional radiation 5/6/2025.  Plan:  See SLS  HTN (hypertension)  Continue home amlodipine.  BP goal-normotensive.  IV labetalol as needed.  Mixed hyperlipidemia  Continue home atorvastatin.  Insomnia  Continue home melatonin.  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 Castro and radiation oncology  S/p radiation therapy from July to September, was on Keytruda which was stopped secondary to pneumonitis, on tapering dose of Decadron,   Has had stereotactic radiation to brain twice-in August 2024 in March 2025.    Per hemonc:     First line metastatic treatment     Carboplatin AUC 5 Pemetrexed 500 mg/m2 and Pembrolizumab 200 mg IV every 3 weeks x 4 cycles     C1 held due to hospital admission  C1 held due to radiation and concurrent high-dose steroid use     C1 now 10/7/2024  C2 10/28/2024-held due to admission     C2 11/14/2024- completed      No treatment since 11/14/2024 due to multiple admissions/toxicities, PCP pneumonia, PE on Xarelto      Second Line metastatic single agent Taoxtere 75 mg/m2     C1 4/4/2025     C2 held 4/24/2025-will decrease to 60 mg/m2 due to toxicities     C2 will be given 5/5/2025 at 60 mg/m2 due to toxicities/potential tolerance     C2 deferred following hospital admission, not yet  administered  Hypothyroidism  Continue home levothyroxine 50 mcg daily.  Cancer associated pain  Continue home gabapentin, hydromorphone 2 mg every 4 hours as needed, acetaminophen 975 mg.  Patient states that her pain is well controlled on this regimen.  Palliative care patient  Follows with ZULEYMA Richards.  GERD (gastroesophageal reflux disease)  Continue home pantoprazole.  History of stroke  R frontal subacute infarct, noted on imaging on 04/21/25  C/w aspirin & statin.  Anemia  Recent Labs     05/12/25  1423 05/13/25  0436 05/14/25  0455   HGB 10.7* 9.7* 9.3*     Anemia of chronic disease.  Hemoglobin baseline 9-10.  Continue to monitor.  History of pulmonary embolism  Previously on Xarelto, which was d/c'd after a new foci of hemorrhage w/in left occipital metastasis during prior admission.  Aspirin daily.    VTE Pharmacologic Prophylaxis: VTE Score: 4 Moderate Risk (Score 3-4) - Pharmacological DVT Prophylaxis Contraindicated. Sequential Compression Devices Ordered.    Mobility:   Basic Mobility Inpatient Raw Score: 17  JH-HLM Goal: 5: Stand one or more mins  JH-HLM Achieved: 7: Walk 25 feet or more  JH-HLM Goal achieved. Continue to encourage appropriate mobility.    Patient Centered Rounds: I performed bedside rounds with nursing staff today.   Discussions with Specialists or Other Care Team Provider: Oncology, Palliative Care     Education and Discussions with Family / Patient: Updated  (daughter) at bedside.    Current Length of Stay: 2 day(s)  Current Patient Status: Inpatient   Certification Statement: The patient will continue to require additional inpatient hospital stay due to Oncology Consult  Discharge Plan: Anticipate discharge in 24-48 hrs to rehab facility.    Code Status: Level 3 - DNAR and DNI    Subjective   Ms. Nye was feeling better this morning. Yesterday she noted back pain which has been helped with a lidocaine patch and voltaren gel, weakness which has improved  overnight, and abdominal pain that resolved after passing gas has improved today. She was eager to see PT/OT and oncology to determine steroid dosing as well as to see if she could receive her next course of chemotherapy during her hospital stay. On ROS she denied fever, chills, headache, changes in vision, dyspnea, chest pain, nausea, vomiting, change in bowel movements, dysuria, dizziness, and numbness    Objective :  Temp:  [98.3 °F (36.8 °C)] 98.3 °F (36.8 °C)  HR:  [76-89] 76  BP: (126-139)/(79-83) 126/83  Resp:  [16] 16  SpO2:  [92 %-94 %] 94 %  O2 Device: None (Room air)    Body mass index is 19.26 kg/m².     Input and Output Summary (last 24 hours):     Intake/Output Summary (Last 24 hours) at 5/14/2025 1134  Last data filed at 5/14/2025 0930  Gross per 24 hour   Intake 680 ml   Output 100 ml   Net 580 ml       Physical Exam  Constitutional:       Appearance: Normal appearance.     Eyes:      Extraocular Movements: Extraocular movements intact.       Cardiovascular:      Rate and Rhythm: Normal rate.      Heart sounds: Normal heart sounds. No murmur heard.     No friction rub. No gallop.   Pulmonary:      Effort: Pulmonary effort is normal.      Breath sounds: No wheezing, rhonchi or rales.   Abdominal:      General: There is no distension.      Palpations: Abdomen is soft. There is no mass.      Tenderness: There is no abdominal tenderness.     Musculoskeletal:         General: No swelling or tenderness.      Right lower leg: No edema.      Left lower leg: No edema.     Neurological:      Mental Status: She is alert and oriented to person, place, and time.      Cranial Nerves: No cranial nerve deficit.      Sensory: No sensory deficit.      Motor: Weakness present.      Comments: Her left upper and lower extremity weakness was 4/5 compared to 3/5 yesterday.        Lines/Drains: None              Lab Results: I have reviewed the following results:   Results from last 7 days   Lab Units 05/14/25  0455   WBC  Thousand/uL 12.92*   HEMOGLOBIN g/dL 9.3*   HEMATOCRIT % 30.4*   PLATELETS Thousands/uL 323   LYMPHO PCT % 1*   MONO PCT % 3*   EOS PCT % 1     Results from last 7 days   Lab Units 05/13/25  0436 05/12/25  1423   SODIUM mmol/L 139 141   POTASSIUM mmol/L 4.5 4.3   CHLORIDE mmol/L 106 104   CO2 mmol/L 25 27   BUN mg/dL 24 23   CREATININE mg/dL 0.88 1.00   ANION GAP mmol/L 8 10   CALCIUM mg/dL 9.2 9.3   ALBUMIN g/dL  --  4.1   TOTAL BILIRUBIN mg/dL  --  0.21   ALK PHOS U/L  --  57   ALT U/L  --  10   AST U/L  --  9*   GLUCOSE RANDOM mg/dL 112 177*         Results from last 7 days   Lab Units 05/14/25  1116 05/14/25  0744 05/13/25  2140 05/13/25  1544 05/12/25  1400   POC GLUCOSE mg/dl 163* 95 157* 177* 180*               Recent Cultures (last 7 days):         Imaging Results Review: I reviewed radiology reports from this admission including: MRI brain.  Other Study Results Review: No additional pertinent studies reviewed.    Last 24 Hours Medication List:     Current Facility-Administered Medications:     acetaminophen (TYLENOL) tablet 975 mg, Q6H KRISS    aluminum-magnesium hydroxide-simethicone (MAALOX) oral suspension 30 mL, Q4H PRN    amLODIPine (NORVASC) tablet 5 mg, Daily    aspirin chewable tablet 81 mg, Daily    atorvastatin (LIPITOR) tablet 40 mg, Daily With Dinner    cyanocobalamin (VITAMIN B-12) tablet 1,000 mcg, Daily    dexamethasone (DECADRON) tablet 2 mg, Q8H KRISS    Diclofenac Sodium (VOLTAREN) 1 % topical gel 2 g, 4x Daily    enoxaparin (LOVENOX) subcutaneous injection 40 mg, Q24H    gabapentin (NEURONTIN) capsule 200 mg, Daily **AND** gabapentin (NEURONTIN) capsule 300 mg, HS    HYDROmorphone (DILAUDID) tablet 2 mg, Q4H PRN **OR** HYDROmorphone (DILAUDID) tablet 4 mg, Q4H PRN    HYDROmorphone HCl (DILAUDID) injection 0.2 mg, Q4H PRN    levETIRAcetam (KEPPRA) tablet 1,000 mg, BID    levothyroxine tablet 50 mcg, Early Morning    lidocaine (LIDODERM) 5 % patch 1 patch, Daily    melatonin tablet 3 mg, HS     methocarbamol (ROBAXIN) tablet 250 mg, Q6H PRN    naloxone (NARCAN) 0.04 mg/mL syringe 0.04 mg, Q1MIN PRN    ondansetron (ZOFRAN-ODT) dispersible tablet 4 mg, Q6H PRN    pantoprazole (PROTONIX) EC tablet 40 mg, Daily Before Breakfast    QUEtiapine (SEROquel) tablet 12.5 mg, HS    senna (SENOKOT) tablet 8.6 mg, Daily    sulfamethoxazole-trimethoprim (BACTRIM DS) 800-160 mg per tablet 1 tablet, Once per day on Monday Wednesday Friday    Administrative Statements   Today, Patient Was Seen By: Curtis Golden  I have spent a total time of 35 minutes in caring for this patient on the day of the visit/encounter including Counseling / Coordination of care.    **Please Note: This note may have been constructed using a voice recognition system.**

## 2025-05-14 NOTE — CASE MANAGEMENT
Case Management Discharge Planning Note    Patient name Ayla Nye  Location S /S -01 MRN [id number]  : 1950 Date 2025       Current Admission Date: 2025  Current Admission Diagnosis:Stroke-like symptoms   Patient Active Problem List    Diagnosis Date Noted    Vasogenic brain edema (HCC) 2025    Abnormal CT of the chest 2025    Stroke-like symptoms 2025    History of stroke 2025    NSVT (nonsustained ventricular tachycardia) (HCC) 2025    Left-sided weakness 2025    Right loin pain 2025    Complication of chemotherapy/immunotherapy 02/15/2025    GERD (gastroesophageal reflux disease) 2025    Epistaxis 2025    History of Pneumocystis jirovecii pneumonia 2025    IgG deficiency (HCC) 2025    Right kidney mass 2025    History of pulmonary embolism 2024    Dyspnea on exertion 2024    Imbalance 2024    Hyponatremia 2024    Leukocytosis 10/27/2024    Anemia 10/25/2024    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    Palliative care patient 2024    Cancer associated pain 2024    Goals of care, counseling/discussion 2024    Right hip pain 09/10/2024    Altered mental status 2024    Hypothyroidism 2024    Abnormal imaging of central nervous system 2024    Acute metabolic encephalopathy 2024    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) 2024    Brain mass 2024    Metastatic cancer to brain (HCC) 2024    Dehydration 2024    Moderate protein-calorie malnutrition (HCC) 2024    Bilateral leg pain 2024    Insomnia 2024    Metastatic adenocarcinoma (HCC) 2024    Age-related osteoporosis without current pathological fracture 2023    Encounter for monitoring of hydroxyurea therapy 2023    JAK2 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 2023    HTN (hypertension) 08/10/2022    Mixed  hyperlipidemia 08/10/2022    Essential thrombocytosis (HCC) 07/02/2020    TB lung, 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    Psoriatic arthritis (HCC) 09/10/2019      LOS (days): 2  Geometric Mean LOS (GMLOS) (days): 4.8  Days to GMLOS:3     OBJECTIVE:  Risk of Unplanned Readmission Score: 71.16         Current admission status: Inpatient   Preferred Pharmacy:   [id number] - [facility] [place] - [address] [facility]  [address] [place]  [facility] [place] [address]  Phone: [phone] Fax: [phone]    [facility] [place] - [address]  [address] [facility]  [place] [address]  Phone: [phone] Fax: [phone]    Primary Care Provider: Rafy Angelo MD    Primary Insurance: MEDICARE  Secondary Insurance: AARP    DISCHARGE DETAILS:    Discharge planning discussed with:: Patient     Comments - Freedom of Choice: Patient and family reported being agreeable to the recommendation of the acute level of rehab and gave permission for a blanket referral.  CM contacted family/caregiver?: Yes             Contacts  Patient Contacts: Juliana  Relationship to Patient:: Family  Contact Method: In Person  Reason/Outcome: Discharge Planning    Requested Home Health Care         Is the patient interested in [facility] at discharge?: No         Other Referral/Resources/Interventions Provided:  Interventions: Acute Rehab  Referral Comments: CM made a blanket acute level of rehab referral.         Treatment Team Recommendation: Acute Rehab  Discharge Destination Plan:: Acute Rehab

## 2025-05-14 NOTE — PLAN OF CARE
Problem: PHYSICAL THERAPY ADULT  Goal: Performs mobility at highest level of function for planned discharge setting.  See evaluation for individualized goals.  Description: Treatment/Interventions: ADL retraining, Functional transfer training, LE strengthening/ROM, Elevations, Therapeutic exercise, Endurance training, Patient/family training, Equipment eval/education, Bed mobility, Gait training, Compensatory technique education, Spoke to nursing, Spoke to case management, OT, Family          See flowsheet documentation for full assessment, interventions and recommendations.  Note:    Problem List: Decreased strength, Decreased endurance, Impaired balance, Decreased mobility, Decreased coordination, Decreased safety awareness, Impaired tone  Assessment: Patient seen for Physical Therapy evaluation. Patient admitted with Stroke-like symptoms.  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  Personal factors affecting patient at time of initial evaluation include: lives in two story house, stairs to enter home, inability to navigate community distances, inability to navigate level surfaces without external assistance, inability to perform dynamic tasks in community, limited home support, positive fall history, and limited insight into impairments. Prior to admission, patient was independent with functional mobility without assistive device, independent with ADLS, requiring assist for IADLS, living alone in two story home with 6 steps to enter, and ambulating household distance.  Please find objective findings from Physical Therapy assessment regarding body systems outlined above with impairments and limitations including weakness, impaired balance, decreased endurance, impaired coordination, gait deviations,  decreased activity tolerance, decreased functional mobility tolerance, decreased safety awareness, fall risk, and impaired tone.  The Barthel Index was used as a functional outcome tool presenting with a score of Barthel Index Score: 50 today indicating marked limitations of functional mobility and ADLS.  Patient's clinical presentation is currently unstable/unpredictable as seen in patient's presentation of changing level of pain, increased fall risk, new onset of impairment of functional mobility, decreased endurance, and new onset of weakness. Pt would benefit from continued Physical Therapy treatment to address deficits as defined above and maximize level of functional mobility. As demonstrated by objective findings, the assigned level of complexity for this evaluation is high.The patient's [facility] Basic Mobility Inpatient Short Form Raw Score is 17. A Raw score of greater than 16 suggests the patient may benefit from discharge to home. Please also refer to the recommendation of the Physical Therapist for safe discharge planning.        Rehab Resource Intensity Level, PT: I (Maximum Resource Intensity)    See flowsheet documentation for full assessment.

## 2025-05-14 NOTE — PROGRESS NOTES
"Progress Note - Palliative Care   Name: Ayla Nye 74 y.o. female I MRN: [id number]  Unit/Bed#: S -01 I Date of Admission: 5/12/2025   Date of Service: 5/14/2025 I Hospital Day: 2    Assessment & Plan  Stroke-like symptoms  Presented to the ED s/p fall   Recent admission on 5/5 and discharged on 5/7 for SLS and found to have infarct in R MCA territory  CT CAP with contrast:  \"Evidence of disease progression with several new and enlarging lesions, new lesions in right lower lobe, right thigh intramuscular mass (R thigh mass contrast enhancing, possible hematoma however not superficial on imaging). Redemonstrated heterogeneous right renal mass A/W urothelial enhancement, concerning for metastatic lesion with probable involvement of the collecting system.\"  CTA head and neck:  \"Redemonstrated vasogenic edema left parietal vertex. No hemorrhage identified.  No significant carotid or vertebral artery stenosis, no focal intracranial stenosis or aneurysm.\"  Low concern for infectious or metabolic etiology, no acute traumatic injury. 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 Castro and radiation oncology  S/p radiation therapy from July to September, was on Keytruda which was stopped secondary to pneumonitis, on tapering dose of Decadron,   Has had stereotactic radiation to brain twice-in August 2024 and March 2025  Cancer associated pain  Current Pain Regimen:  PO hydromorphone 2-4 mg Q4hrs PRN for moderate/severe pain  IV hydromorphone 0.2 mg Q4hrs PRN for BTP  ATC Tylenol 975 mg Q6hrs  Continue Robaxin 250 mg Q6hrs PRN for muscle spasms<--- feels this has been helpful  Gabapentin 200 mg daily and 300 daily at bedtime   Narcan for safety   Other Symptom Management:  PO Zofran 4 mg Q6hrs PRN "     OME's in the past 24 hours: 0    Palliative care patient  Palliative diagnoses: Stage IV adenocarcinoma of the lung    Goals of care  Level 3 code status  Disease focused care w/ DNR/DNI limits in place   Concerns introduced include:  Aware of MRI results  Wants to go to rehab to get stronger  Wants to hear what options Dr. Castro has to offer.  Will continue discussions regarding GOC as patient's clinical presentation evolves.  Encouraged follow up with Palliative Medicine on an outpatient basis after discharge for continued symptom management. Our office will contact patient to schedule a hospital follow up.    Social support:  Supportive listening provided  Normalized experience of patient/family  Provided anxiety containment  Provided anticipatory guidance  Encouraged self care  Living situation:  Follows w/ Home Palliative Program     Follow up  Palliative Care will continue to follow and goals of care discussions will be ongoing.    Please reach out via [employer] Secure Chat if questions or concerns arise.    Care Coordination  Reviewed case with SLIM    PDMP Review: I have reviewed the patient's controlled substance dispensing history in the Prescription Drug Monitoring Program in compliance with the [facility] regulations before prescribing any controlled substances.    Decisional apparatus: Patient does have capacity on exam today. If capacity is lost, patient's substitute decision maker would default to daughters by [place] Act 169.    Advance Directive/Living Will, POLST and POA Forms: On file and reviewed, lists Juliana or Sue as medical POAs    ER contacts:  Name Relation Home Work Mobile   Juliana Camarena Daughter [phone]   Sue Antony Daughter [phone]   Elva Cardoza Sister In-Law   [phone]     We appreciate the invitation to be involved in this patient's care.  We will continue to follow throughout this hospitalization.  Please do not hesitate to reach our on call  provider through our clinic answering service at [phone] should you have acute symptom control concerns.    History of stroke  R frontal subacute infarct, noted on imaging on 04/21/25   Vasogenic brain edema (HCC)  2/2 to metastatic adenocarcinoma  Maintained on Decadron 2 mg BID PTA      PDMP Review: I have reviewed the patient's controlled substance dispensing history in the Prescription Drug Monitoring Program in compliance with the [facility] regulations before prescribing any controlled substances.    Subjective   24 Hour Chart History:  Chart Reviewed prior to visit.    Patient resting in bed. No family present.   Aware of MRI results- she wants to go to rehab to get stronger. She reports that she is waiting to hear if Dr. Castro has options for her regarding her treatment and steroids. She is open to further goals of care discussions, but would want to go to rehab to get stronger prior to going home.       Objective :  Temp:  [98.2 °F (36.8 °C)] 98.2 °F (36.8 °C)  HR:  [76-87] 87  BP: (126-153)/(82-97) 153/97  Resp:  [16-19] 19  SpO2:  [92 %-94 %] 92 %  O2 Device: None (Room air)    Physical Exam  Vitals and nursing note reviewed.   Constitutional:       General: She is not in acute distress.  HENT:      Head: Normocephalic and atraumatic.     Eyes:      General:         Right eye: No discharge.         Left eye: No discharge.       Cardiovascular:      Rate and Rhythm: Normal rate.   Pulmonary:      Effort: Pulmonary effort is normal. No respiratory distress.     Musculoskeletal:         General: Swelling present.      Cervical back: Normal range of motion.     Skin:     General: Skin is warm and dry.     Neurological:      Mental Status: She is alert and oriented to person, place, and time.     Psychiatric:         Mood and Affect: Mood normal.         Behavior: Behavior normal.         Thought Content: Thought content normal.         Judgment: Judgment normal.            Lab Results: I have reviewed the  following results:  Lab Results   Component Value Date/Time    SODIUM 139 05/13/2025 04:36 AM    K 4.5 05/13/2025 04:36 AM    BUN 24 05/13/2025 04:36 AM    CREATININE 0.88 05/13/2025 04:36 AM    GLUC 112 05/13/2025 04:36 AM    CALCIUM 9.2 05/13/2025 04:36 AM    AST 9 (L) 05/12/2025 02:23 PM    ALT 10 05/12/2025 02:23 PM    ALB 4.1 05/12/2025 02:23 PM    TP 7.1 05/12/2025 02:23 PM    EGFR 64 05/13/2025 04:36 AM     Lab Results   Component Value Date/Time    HGB 9.3 (L) 05/14/2025 04:55 AM    WBC 12.92 (H) 05/14/2025 04:55 AM     05/14/2025 04:55 AM    INR 0.94 05/05/2025 10:44 AM    PTT 25 05/05/2025 10:44 AM     Lab Results   Component Value Date/Time    [id number] 0.408 (L) 05/12/2025 02:23 PM       Code Status: Level 3 - DNAR and DNI  Advance Directive and Living Will:      Power of :    POLST:      Administrative Statements   I have spent a total time of 30 minutes in caring for this patient on the day of the visit/encounter including Prognosis, Risks and benefits of tx options, Instructions for management, Patient and family education, Importance of tx compliance, Risk factor reductions, Impressions, Counseling / Coordination of care, Documenting in the medical record, Reviewing/placing orders in the medical record (including tests, medications, and/or procedures), Obtaining or reviewing history  , and Communicating with other healthcare professionals .

## 2025-05-14 NOTE — PLAN OF CARE
Problem: OCCUPATIONAL THERAPY ADULT  Goal: Performs self-care activities at highest level of function for planned discharge setting.  See evaluation for individualized goals.  Description: Treatment Interventions: ADL retraining, Functional transfer training, UE strengthening/ROM, Endurance training, Cognitive reorientation, Patient/family training, Equipment evaluation/education, Neuromuscular reeducation, Fine motor coordination activities, Compensatory technique education, Continued evaluation, Energy conservation, Activityengagement          See flowsheet documentation for full assessment, interventions and recommendations.   Note: Limitation: Decreased ADL status, Decreased UE ROM, Decreased UE strength, Decreased Safe judgement during ADL, Decreased endurance, Decreased fine motor control, Decreased self-care trans, Decreased high-level ADLs  Prognosis: Fair  Assessment: Pt is a 73 yo female who presented to [facility] s/p fall with worsening LUE/LLE weakness in which pt dx w/ stroke-like symptoms. Pt  has a past medical history of DILLON (acute kidney injury) ([facility]) (02/14/2025), Allergic (2022), Cancer ([facility]), Cancer ([facility]), Cancer ([facility]), Cataract (Nov. 2023), Essential thrombocytosis ([facility]), History of transfusion, Hyperlipidemia (2015), Hypertension (2011), Mass in chest, Nodular goiter, Osteoporosis, Peripheral neuropathy, Pneumonia (Oct 16, 2023), Psoriasis, and SIRS (systemic inflammatory response syndrome) ([facility]) (06/22/2024). Pt with active OT orders in which OT consulted to assess pt's functional status and occupational performance to determine safe d/c needs. Pt seen with PT to increase safety, decrease fall risk, and maximize functional/occupational performance 2* medical complexity which is a regression from pt's functional baseline. Pt lives alone in a [facility] with 6 HARRY. PTA, pt has been requiring increased assistance with ADLs/IADLs and uses no DME for functional mobility. (-) driving.  Currently, pt performing functional transfers/mobility w/ Min A x1 w/ RW vs HHA, UB ADLs w/ Min A, and LB ADLs w/ Mod A. Pt demonstrates the following limitations/impairments which impact the pt's ability to engage in valued occupations: functional ROM, coordination, fine motor skills, dexterity,  strength, balance, endurance/activity tolerance, standing tolerance, functional reach, postural/trunk control, strength, safety awareness, and pain. From an OT standpoint, recommend discharge w/ Level 1 resources once medically stable. The patient's raw score on the -PAC Daily Activity Inpatient Short Form is 16. A raw score of less than 19 suggests the patient may benefit from discharge to post-acute rehabilitation services. Please refer to the recommendation of the Occupational Therapist for safe discharge planning.  Pt would benefit from skilled OT services 3-5x/wk to address acute care needs and underlying performance skills to promote safety, decrease fall risk, and enhance occupational performance to return to PLOF. Goals to be met within the next 10-14 days.     Rehab Resource Intensity Level, OT: I (Maximum Resource Intensity)

## 2025-05-14 NOTE — ASSESSMENT & PLAN NOTE
Recent Labs     05/12/25  1423 05/13/25  0436 05/14/25  0455   HGB 10.7* 9.7* 9.3*     Anemia of chronic disease.  Hemoglobin baseline 9-10.  Continue to monitor.

## 2025-05-14 NOTE — PHYSICAL THERAPY NOTE
PHYSICAL THERAPY EVALUATION/TREATMENT    NAME:  Ayla Nye  AGE:   74 y.o.  Mrn:   [id number]  Length Of Stay: 2    ADMIT DX:  Cerebral edema (HCC) [G93.6]  Neurologic disorder [G98.8]  Lung cancer (HCC) [C34.90]  Metastasis to brain (HCC) [C79.31]  Elevated blood pressure reading [R03.0]  Left-sided weakness [R53.1]  Stroke-like symptoms [R29.90]    Past Medical History:   Diagnosis Date    DILLON (acute kidney injury) (HCC) 02/14/2025    Allergic 2022    Allergic to neomiacin and cephalexin    Cancer (HCC)     lung cancer    Cancer (HCC)     mets to brain    Cancer (HCC)     perianal mass    Cataract Nov. 2023    Due to have durgery June 2024    Essential thrombocytosis (HCC)     controlled with medication    History of transfusion     Hyperlipidemia 2015    Hypertension 2011    Mass in chest     Nodular goiter     Osteoporosis     Peripheral neuropathy     Pneumonia Oct 16, 2023    Also  Feb 2024    Psoriasis     SIRS (systemic inflammatory response syndrome) (HCC) 06/22/2024     Past Surgical History:   Procedure Laterality Date    APPENDECTOMY  1954    BREAST CYST EXCISION Right     COLONOSCOPY  10/31/2018    DXA PROCEDURE (HISTORICAL)  04/21/2017    IR BIOPSY OTHER  09/12/2024    IR LUMBAR PUNCTURE  09/12/2024    MAMMO (HISTORICAL)      8-24-18    MAMMO NEEDLE LOCALIZATION RIGHT (ALL INC) Right 07/13/2009    SKIN LESION EXCISION      bridge of nose        05/14/25 0930   PT Last Visit   PT Visit Date 05/14/25   Note Type   Note type Evaluation   Pain Assessment   Pain Assessment Tool 0-10   Pain Score No Pain   Restrictions/Precautions   Other Precautions Fall Risk;Bed Alarm;Chair Alarm  (William on room air)   Home Living   Type of Home House   Home Layout Two level;Bed/bath upstairs;Stairs to enter with rails  (6 steps to enter with a rail; commode on the first floor)   Bathroom Shower/Tub Tub/shower unit   Bathroom Toilet Raised   Bathroom Equipment Toilet raiser;Commode;Shower chair;Grab bars in  "shower   Bathroom Accessibility Accessible   Home Equipment Walker;Cane  (Standard walker; states ordered a roller walker that is still in the box)   Prior Function   Level of [place] Independent with ADLs;Independent with functional mobility;Needs assistance with IADLS  (Recently has needed assist for ADLs)   Lives With (S)  Alone   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;Home health;Just started Home PT  (6x/wk x 5 hrs; ex- drives patient to appointments)   IADLs Family/Friend/Other provides meals;Family/Friend/Other provides medication management;Family/Friend/Other provides transportation   Falls in the last 6 months 1 to 4  (1, reason for admission + 1 other recent fall.  Patient's daughter present who reports patient has had more falls than what patient is confirming, daughter states \"she falls all the time\")   Vocational Retired   Comments Patient normally ambulates without an assistive device prior to admission.  Does confirm cruising furniture and walls   General   Additional Pertinent History Patient is admitted with worsening left-sided weakness, a fall with head strike but no loss of consciousness.  Patient has a history of stage IV lung cancer with mets to brain.   Family/Caregiver Present Yes  (Patient's daughter and ex-)   Cognition   Overall Cognitive Status WFL   Arousal/Participation Cooperative   Orientation Level Oriented X4   Memory Decreased recall of recent events   Following Commands Follows one step commands without difficulty   Comments At least 2 patient identifiers including name and date of birth   Subjective   Subjective Patient agreeable to PT. reports left side has been feeling weaker   RLE Assessment   RLE Assessment WFL  (Hip 3-3+/5; knee and ankle 3+ to 4 -/5)   LLE Assessment   LLE Assessment WFL  (Hip 3 - to 3/5; knee and ankle 3-3+/5)   Vision-Basic Assessment   Current Vision Wears glasses all the time   Light Touch   RLE Light Touch Grossly intact   LLE Light Touch " Grossly intact   Bed Mobility   Additional Comments Patient received sitting edge of bed with OT   Transfers   Sit to Stand 4  Minimal assistance   Additional items Assist x 1;Verbal cues;Increased time required   Stand to Sit 4  Minimal assistance   Additional items Assist x 1;Verbal cues;Increased time required   Ambulation/Elevation   Gait pattern L Foot drag;L Hemiparesis;Improper Weight shift;Narrow BILL;Decreased foot clearance;Decreased L stance;Inconsistent monica;Redundant gait at times;Short stride;Step through pattern;Decreased heel strike  (Generalized, multidirectional unsteadiness; patient reaching out for walls and furniture)   Gait Assistance 4  Minimal assist   Additional items Assist x 1;Verbal cues;Tactile cues   Assistive Device None   Distance 50 feet with change in direction; patient reaching out for walls and furniture; last half of gait with return to room, marked gait degradation, BLE becoming weak, patient needing heavy min assist for support and balance.   Balance   Static Sitting Fair   Static Standing Fair -   Ambulatory   (P+ progressing to poor with fatigue)   Endurance Deficit   Endurance Deficit Yes   Endurance Deficit Description Limited overall activity, standing and gait endurance, needing therapeutic rest, gait degradation toward end of ambulation distance   Activity Tolerance   Activity Tolerance Patient limited by fatigue;Treatment limited secondary to medical complications (Comment)  (Generalized weakness L>R)   Medical Staff Made Aware CM;OT   Nurse Made Aware RN   Assessment   Problem List Decreased strength;Decreased endurance;Impaired balance;Decreased mobility;Decreased coordination;Decreased safety awareness;Impaired tone   Assessment Patient seen for Physical Therapy evaluation. Patient admitted with Stroke-like symptoms.  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  Personal factors affecting patient at time of initial evaluation include: lives in two story house, stairs to enter home, inability to navigate community distances, inability to navigate level surfaces without external assistance, inability to perform dynamic tasks in community, limited home support, positive fall history, and limited insight into impairments. Prior to admission, patient was independent with functional mobility without assistive device, independent with ADLS, requiring assist for IADLS, living alone in two story home with 6 steps to enter, and ambulating household distance.  Please find objective findings from Physical Therapy assessment regarding body systems outlined above with impairments and limitations including weakness, impaired balance, decreased endurance, impaired coordination, gait deviations, decreased activity tolerance, decreased functional mobility tolerance, decreased safety awareness, fall risk, and impaired tone.  The Barthel Index was used as a functional outcome tool presenting with a score of Barthel Index Score: 50 today indicating marked limitations of functional mobility and ADLS.  Patient's clinical presentation is currently unstable/unpredictable as seen in patient's presentation of changing level of pain, increased fall risk, new onset of impairment of functional mobility, decreased endurance, and new onset of weakness. Pt would benefit from continued Physical Therapy treatment to address deficits as defined above and maximize level of functional mobility. As demonstrated by objective findings, the assigned level of complexity for this evaluation is high.    The patient's -[facility] Basic Mobility Inpatient Short Form Raw Score is 17. A Raw score of greater than 16 suggests the patient may benefit from discharge to home. Please also refer to the recommendation of the  "Physical Therapist for safe discharge planning.   Goals   Patient Goals \"To maintain independence is much as I can\"   STG Expiration Date 05/24/25   Short Term Goal #1 Patient will: Increase bilateral LE strength 1 grade to facilitate independent mobility, Perform all bed mobility tasks independently to improve pt's independence w/ repositioning for decrease risk of skin breakdown, Perform all transfers independently consistently from various height surfaces in order to improve I w/ engagement w/ real-world environments/situations, Ambulate at least 150 ft. with LRAD versus no AD independently w/o LOB to facilitate return and engagement w/ previous living environment, Navigate flight of stairs independently with unilateral handrail to either improve independence w/ entering home and/or so patient can fully access living areas in home, Increase all balance 1 grade to decrease risk for falls, Tolerate at least 30 consecutive minutes of activity to demonstrate improved activity tolerance and endurance, and Tolerate 3 hr OOB to faciliate upright tolerance   Plan   Treatment/Interventions ADL retraining;Functional transfer training;LE strengthening/ROM;Elevations;Therapeutic exercise;Endurance training;Patient/family training;Equipment eval/education;Bed mobility;Gait training;Compensatory technique education;Spoke to nursing;Spoke to case management;OT;Family   PT Frequency 3-5x/wk   Discharge Recommendation   Rehab Resource Intensity Level, PT I (Maximum Resource Intensity)   AM-PAC Basic Mobility Inpatient   Turning in Flat Bed Without Bedrails 3   Lying on Back to Sitting on Edge of Flat Bed Without Bedrails 3   Moving Bed to Chair 3   Standing Up From Chair Using Arms 3   Walk in Room 3   Climb 3-5 Stairs With Railing 2   Basic Mobility Inpatient Raw Score 17   Basic Mobility Standardized Score 39.67   [facility] Highest Level Of Mobility   -HLM Goal 5: Stand one or more mins   -HLM Achieved 7: Walk 25 feet or " "more   Barthel Index   Feeding 5   Bathing 0   Grooming Score 0   Dressing Score 5   Bladder Score 10   Bowels Score 10   Toilet Use Score 5   Transfers (Bed/Chair) Score 10   Mobility (Level Surface) Score 0   Stairs Score 5   Barthel Index Score 50   Additional Treatment Session   Start Time 0930   End Time 0947   Treatment Assessment Patient agreeable to trial of ambulation with a roller walker.  Patient transfers sit to stand with cues for safe hand placement and minimal assistance.  Patient ambulates with a roller walker for 15 feet with change in direction - pt having marked difficulty maintaining left hand on the roller walker, although reports that she does feel good support from the roller walker.  Patient also intermittently \"banging\" her left foot into the roller walker secondary to decreased control of left foot advancement and placement.  Roller walker then taken away from patient and patient ambulated 20 feet with change in direction with minimal handhold assist back to room.  Patient transfers stand to sit with min assist.  Patient fatigued with ambulatory activities needing increased assist when approaching room, in room and transferring to chair secondary to fatigue and gait degradation.  A: patient with limited tolerance to PT evaluation and treatment secondary to left-sided weakness and limited tolerance/endurance to functional activities/gait.  While admitted, patient will continue to benefit from skilled physical therapy services to continue to increase her strength, balance, endurance, safe functional mobility and gait.  At this time, patient will benefit from continued ambulation both with and without LRADs.  When medically stable for discharge patient is recommended for Level I Maximum Resource Intensity.   End of Consult   Patient Position at End of Consult Bed/Chair alarm activated;Bedside chair;All needs within reach   Licensure   [place] License Number  Marsha L Naun, PT   Portions of the " documentation may have been created using voice recognition software. Occasional wrong word or sound alike substitutions may have occurred due to the inherent limitation of the voice recognition software. Read the chart carefully and recognize, using context, where substitutions have occurred.

## 2025-05-14 NOTE — PROGRESS NOTES
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 #   Marital Status:   Ethnicity: White  Employment Status: retired  Extended Emergency Contact Information  Primary Emergency Contact: Juliana Camarena   [facility]  Home Phone: [phone]  Mobile Phone: [phone]  Relation: Daughter  Secondary Emergency Contact: Sue Antony   [facility]  Home Phone: [phone]  Mobile Phone: [phone]  Relation: Daughter  Advance Directive: Level 3 DNAR and DNI (has ACP docs)     INSURANCE/COVERAGE:     Primary Payor: MEDICARE / Plan: MEDICARE A AND B / Product Type: Medicare A & B Fee for Service /   Secondary Payer:[facility]  ID# [id number]   Payer Contact:  Payer Contact:   Contact Phone:  Contact Phone:     MEDICARE #: [id number]  Medicare Days: 56/30/60  Medical Record #: [id number]    REFERRAL SOURCE:   Referring provider: Georgie Bishop DO  Referring facility: [facility]  Room: S /S -01  PCP: Rafy Angelo MD PCP phone number: [phone]    MEDICAL INFORMATION  HPI: Pt is a 74 year old female with PMH of stage IV lung adenocarcinoma with brain mets, status post radiation, with a recent admission found to have a right MCA frontal lobe stroke, hypertension, history of PE, hypothyroidism who presented to [facility] on 5/12 with progressive left-sided weakness and a fall. Recent admission on 5/5 and discharged on 5/7 for SLS and found to have infarct in R MCA territory.  Continue with aspirin  and statin, Xarelto discontinued in April 2025 after new foci of hemorrhage within the left occipital metastasis.  Vasogenic brain edema, maintained on dexamethasone. A brain MRI was done last week that showed a slight increase in the left occipital brain metastasis with vasogenic edema.  She was already on Decadron 2 mg daily, for which we increased transiently her p.o. steroids with a short taper to come back down to 4 mg daily.  Radiation oncology evaluated her at that time and did not deem her to need any radiation therapy at this time and to return for an appointment in June. Atraumatic workup was negative in the ED. Low concern for infectious or metabolic etiology, no acute traumatic injury noted on imaging.  Likely related to increasing burden of metastasis or recrudescence. 5/13/25. Vitals are stable and roughly WNL.  Labs show persistent leukocytosis of 12.9, Hb 9.3, , and roughly normal CMP.  MRI brain showed stable left superior occipital lobe and left parietal lobe lesions with surrounding vasogenic edema, no new lesions, and stable right posterior frontal lobe sequela of subacute ischemia.  CTA head/neck was negative for carotid or vertebral arterial stenosis.  CT CAP was significant for disease progression including several new and enlarging lesions including progression of a likely metastatic right renal mass concerning for probable involvement of collecting system. Oncology was consulted for recommendations for dexamethasone and inpatient chemotherapy. Recommend not tapering below dexamethasone 6 mg daily at this time.  Okay to change from 2 mg every 8 hours to 3 mg every 12 hours when she discharges for rehab. Hold cycle 2 of reduced-dose docetaxel until outpatient follow up after rehab    PT and OT have been consulted and are recommending post-acute rehab services.   Patient's case has been reviewed and patient meets medical criteria for acute rehab and has demonstrated the ability to  tolerate three or more hours of therapy per day. Patient is medically stable and ready for discharge to [facility].        Past Medical History:   Past Surgical History:   Allergies:     Past Medical History:   Diagnosis Date    DILLON (acute kidney injury) (HCC) 02/14/2025    Allergic 2022    Allergic to neomiacin and cephalexin    Cancer (HCC)     lung cancer    Cancer (HCC)     mets to brain    Cancer (HCC)     perianal mass    Cataract Nov. 2023    Due to have durgery June 2024    Essential thrombocytosis (HCC)     controlled with medication    History of transfusion     Hyperlipidemia 2015    Hypertension 2011    Mass in chest     Nodular goiter     Osteoporosis     Peripheral neuropathy     Pneumonia Oct 16, 2023    Also  Feb 2024    Psoriasis     SIRS (systemic inflammatory response syndrome) (HCC) 06/22/2024    Past Surgical History:   Procedure Laterality Date    APPENDECTOMY  1954    BREAST CYST EXCISION Right     COLONOSCOPY  10/31/2018    DXA PROCEDURE (HISTORICAL)  04/21/2017    IR BIOPSY OTHER  09/12/2024    IR LUMBAR PUNCTURE  09/12/2024    MAMMO (HISTORICAL)      8-24-18    MAMMO NEEDLE LOCALIZATION RIGHT (ALL INC) Right 07/13/2009    SKIN LESION EXCISION      bridge of nose     Allergies   Allergen Reactions    Doxycycline Headache    Keflex [Cephalexin] Rash    Neomycin-Polymyxin-Dexameth Eye Swelling         Medical/functional conditions requiring inpatient rehabilitation: R MCA CVA, left sided weakness, brain mets, impaired self care and mobility, decreased strength/endurance    Risk for medical/clinical complications: risk for falls, risk for skin breakdown secondary to decreased mobility, risk for extension or additional infarcts, risk for hypertensive episodes    Comorbidities:  R MCA CVA  Left sided wekaness  Vasogenic brain edema  HTN  HLD  Insomnia    Surgeries in the last 100 days: n/a    CURRENT VITAL SIGNS:   Temp:  [98.3 °F (36.8 °C)] 98.3 °F (36.8 °C)  HR:  [76-89] 76  BP: (126-139)/(79-83)  126/83  Resp:  [16] 16  SpO2:  [92 %-94 %] 94 %  O2 Device: None (Room air)   Intake/Output Summary (Last 24 hours) at 5/14/2025 1512  Last data filed at 5/14/2025 1317  Gross per 24 hour   Intake 680 ml   Output 100 ml   Net 580 ml        LABORATORY RESULTS:      Lab Results   Component Value Date    HGB 9.3 (L) 05/14/2025    HCT 30.4 (L) 05/14/2025    WBC 12.92 (H) 05/14/2025     Lab Results   Component Value Date    BUN 24 05/13/2025    K 4.5 05/13/2025     05/13/2025    CREATININE 0.88 05/13/2025     Lab Results   Component Value Date    PROTIME 13.2 05/05/2025    INR 0.94 05/05/2025        DIAGNOSTIC STUDIES:  MRI brain w wo contrast  Result Date: 5/13/2025  Impression: Stable enhancing lesions within the left superior occipital lobe and left parietal lobe with surrounding vasogenic edema. No new enhancing lesions. Stable foci of restricted diffusion within the right posterior frontal lobe most likely sequela of subacute ischemia. No progression, mass effect or abnormal enhancement identified. Workstation performed: [id number]     CT chest abdomen pelvis w contrast  Result Date: 5/12/2025  Impression: No findings of acute traumatic injury in the chest, abdomen or pelvis. History of metastatic lung cancer with evidence for disease progression with several new and enlarging lesions as described above. Redemonstrated heterogeneous right renal mass associated with urothelial enhancement is concerning for metastatic lesion with probable involvement of the collecting system. The study was marked in EPIC for immediate notification. Resident: Damaso Briscoe I, the attending radiologist, have reviewed the images and agree with the final report above. Workstation performed: [id number]     CTA head and neck w wo contrast  Result Date: 5/12/2025  Impression: CT Brain: Vasogenic edema left parietal vertex compatible with known brain metastasis. No hemorrhage identified. CT Angiography: No significant carotid or  vertebral artery stenosis. No focal intracranial stenosis or aneurysm. Workstation performed: [id number]       PRECAUTIONS/SPECIAL NEEDS:  Anticoagulation:  aspirin 81 mg orally every day and lovenox, Edema Management, Safety Concerns, Pain Management, and Language Preference: English, Fall precautions    MEDICATIONS:   Current Medications[1]    SKIN INTEGRITY:   no rashes, no erythema, no peripheral edema    PRIOR LEVEL OF FUNCTION:  She lives in a(n) single family home  Ayla Nye is  and lives alone. Pt has good family support and HHA 6 days/wk 5 hrs a day  Self Care: Needed some help, Indoor Mobility: Independent, Stairs (in/outdoor): Independent, and Cognition: Independent    FALLS IN THE LAST 6 MONTHS: 1-4    HOME ENVIRONMENT:  The living area: Two level;Bed/bath upstairs;Stairs to enter with rails  (6 steps to enter with a rail; commode on the first floor)    The patient will not have 24 hour supervision/physical assistance available upon discharge.    PREVIOUS DME:  Equipment in home (previous DME): Commode, Shower Chair, Rolling Walker, Standard Walker, and Single Point Cane    FUNCTIONAL STATUS:  Physical Therapy Occupational Therapy Speech Therapy   05/14/25 0930    PT Last Visit   PT Visit Date 05/14/25   Note Type   Note type Evaluation   Pain Assessment   Pain Assessment Tool 0-10   Pain Score No Pain   Restrictions/Precautions   Other Precautions Fall Risk;Bed Alarm;Chair Alarm  (William on room air)   Home Living   Type of Home House   Home Layout Two level;Bed/bath upstairs;Stairs to enter with rails  (6 steps to enter with a rail; commode on the first floor)   Bathroom Shower/Tub Tub/shower unit   Bathroom Toilet Raised   Bathroom Equipment Toilet raiser;Commode;Shower chair;Grab bars in shower   Bathroom Accessibility Accessible   Home Equipment Walker;Cane  (Standard walker; states ordered a roller walker that is still in the box)   Prior Function   Level of [place] Independent  "with ADLs;Independent with functional mobility;Needs assistance with IADLS  (Recently has needed assist for ADLs)   Lives With (S)  Alone   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;Home health;Just started Home PT  (6x/wk x 5 hrs; ex- drives patient to appointments)   IADLs Family/Friend/Other provides meals;Family/Friend/Other provides medication management;Family/Friend/Other provides transportation   Falls in the last 6 months 1 to 4  (1, reason for admission + 1 other recent fall.  Patient's daughter present who reports patient has had more falls than what patient is confirming, daughter states \"she falls all the time\")   Vocational Retired   Comments Patient normally ambulates without an assistive device prior to admission.  Does confirm cruising furniture and walls   General   Additional Pertinent History Patient is admitted with worsening left-sided weakness, a fall with head strike but no loss of consciousness.  Patient has a history of stage IV lung cancer with mets to brain.   Family/Caregiver Present Yes  (Patient's daughter and ex-)   Cognition   Overall Cognitive Status WFL   Arousal/Participation Cooperative   Orientation Level Oriented X4   Memory Decreased recall of recent events   Following Commands Follows one step commands without difficulty   Comments At least 2 patient identifiers including name and date of birth   Subjective   Subjective Patient agreeable to PT. reports left side has been feeling weaker   RLE Assessment   RLE Assessment WFL  (Hip 3-3+/5; knee and ankle 3+ to 4 -/5)   LLE Assessment   LLE Assessment WFL  (Hip 3 - to 3/5; knee and ankle 3-3+/5)   Vision-Basic Assessment   Current Vision Wears glasses all the time   Light Touch   RLE Light Touch Grossly intact   LLE Light Touch Grossly intact   Bed Mobility   Additional Comments Patient received sitting edge of bed with OT   Transfers   Sit to Stand 4  Minimal assistance   Additional items Assist x 1;Verbal cues;Increased time " required   Stand to Sit 4  Minimal assistance   Additional items Assist x 1;Verbal cues;Increased time required   Ambulation/Elevation   Gait pattern L Foot drag;L Hemiparesis;Improper Weight shift;Narrow BILL;Decreased foot clearance;Decreased L stance;Inconsistent monica;Redundant gait at times;Short stride;Step through pattern;Decreased heel strike  (Generalized, multidirectional unsteadiness; patient reaching out for walls and furniture)   Gait Assistance 4  Minimal assist   Additional items Assist x 1;Verbal cues;Tactile cues   Assistive Device None   Distance 50 feet with change in direction; patient reaching out for walls and furniture; last half of gait with return to room, marked gait degradation, BLE becoming weak, patient needing heavy min assist for support and balance.   Balance   Static Sitting Fair   Static Standing Fair -   Ambulatory    (P+ progressing to poor with fatigue)   Endurance Deficit   Endurance Deficit Yes   Endurance Deficit Description Limited overall activity, standing and gait endurance, needing therapeutic rest, gait degradation toward end of ambulation distance   Activity Tolerance   Activity Tolerance Patient limited by fatigue;Treatment limited secondary to medical complications (Comment)  (Generalized weakness L>R)   Medical Staff Made Aware CM;OT   Nurse Made Aware RN   Assessment   Problem List Decreased strength;Decreased endurance;Impaired balance;Decreased mobility;Decreased coordination;Decreased safety awareness;Impaired tone   Assessment Patient seen for Physical Therapy evaluation. Patient admitted with Stroke-like symptoms.  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  Personal factors affecting patient at time of initial evaluation include: lives in two story  house, stairs to enter home, inability to navigate community distances, inability to navigate level surfaces without external assistance, inability to perform dynamic tasks in community, limited home support, positive fall history, and limited insight into impairments. Prior to admission, patient was independent with functional mobility without assistive device, independent with ADLS, requiring assist for IADLS, living alone in two story home with 6 steps to enter, and ambulating household distance.  Please find objective findings from Physical Therapy assessment regarding body systems outlined above with impairments and limitations including weakness, impaired balance, decreased endurance, impaired coordination, gait deviations, decreased activity tolerance, decreased functional mobility tolerance, decreased safety awareness, fall risk, and impaired tone.  The Barthel Index was used as a functional outcome tool presenting with a score of Barthel Index Score: 50 today indicating marked limitations of functional mobility and ADLS.  Patient's clinical presentation is currently unstable/unpredictable as seen in patient's presentation of changing level of pain, increased fall risk, new onset of impairment of functional mobility, decreased endurance, and new onset of weakness. Pt would benefit from continued Physical Therapy treatment to address deficits as defined above and maximize level of functional mobility. As demonstrated by objective findings, the assigned level of complexity for this evaluation is high.     The patient's -[facility] Basic Mobility Inpatient Short Form Raw Score is 17. A Raw score of greater than 16 suggests the patient may benefit from discharge to home. Please also refer to the recommendation of the Physical Therapist for safe discharge planning.    05/14/25 0946    OT Last Visit   OT Visit Date 05/14/25   Note Type   Note type Evaluation   Additional Comments Pt seen w/ PT to increase safety,  decrease fall risk, and maximize functional/occupational performance 2* medical complexity which is a regression from pt's functional baseline.   Pain Assessment   Pain Assessment Tool 0-10   Pain Score No Pain   Hospital Pain Intervention(s) Repositioned;Ambulation/increased activity   Restrictions/Precautions   Weight Bearing Precautions Per Order No   Other Precautions Chair Alarm;Bed Alarm;Multiple lines;Fall Risk;Pain   Home Living   Type of Home House  ([facility] with 6 HARRY)   Home Layout Two level;Bed/bath upstairs;Performs ADLs on one level;Access   Bathroom Shower/Tub Tub/shower unit  (BSC on first floor; bathroom only on 2nd floor)   Bathroom Toilet Raised  (Toilet raiser over toilet)   Bathroom Equipment Shower chair;Commode   Bathroom Accessibility Accessible   Home Equipment Walker;Cane;Other (Comment)  (Standard walker and rolling walker (reporting they just ordered rolling walker and it was delivered))   Additional Comments Pt reports living alone in a [facility] with 6 HARRY with full flight of stairs to access 2nd floor; no bathroom on first floor, reporting currently using BSC on first floor; typically does not use DME for functional mobility, reporting that she holds onto furniture, but has RW and SPC; reporting that when she navigates the stairs, she goes down on her bottom   Prior Function   Level of [place] Needs assistance with ADLs;Needs assistance with functional mobility;Needs assistance with IADLS   Lives With (S)  Alone   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;Home health;Personal care attendant  (Pt reporting that she was supposed to have home health OT/PT come in today however she is currently admitted; has home health aide that comes 6 days/week for 5 hours)   IADLs Family/Friend/Other provides transportation;Family/Friend/Other provides meals;Family/Friend/Other provides medication management   Falls in the last 6 months (S)  1 to 4  (Pt reporting 2 falls however daughter reporting that pt has additional  "falls)   Vocational Retired   Comments (-) driving; pt reporting that she has home health aides that come 6 days/week for 5 hours in which they assist with IADLs however more recently, she has been requiring increased assistance with ADLs; reports that when she showers, the aide will assist getting in/out of the tub, but will sit outside the tub then for bathing   Lifestyle   Autonomy PTA, pt has recently required increased assistance with ADLs, has assistance with IADLs, and uses no DME for functional mobility   Reciprocal Relationships Lives alone; very supportive family (2 daughters and ex- who assist with functional needs when able); home health aide for 6 days/week for 5 hours; was supposed to have home health OT/PT however unable to be evaluated as she is currently admitted   Service to Others Retired, previously working for [facility] [employer]   Intrinsic Gratification Enjoys watching TV, her independence, and family   General   Family/Caregiver Present Yes   Additional General Comments Pt's daughter and ex- present, very supportive and interactive throughout session   Subjective   Subjective \"Normally I can dress myself, but coming in, my aide had to help me more.\"   ADL   Where Assessed Edge of bed   Eating Assistance 5  Supervision/Setup   Grooming Assistance 4  Minimal Assistance   UB Bathing Assistance 4  Minimal Assistance   LB Bathing Assistance 3  Moderate Assistance   UB Dressing Assistance 4  Minimal Assistance   LB Dressing Assistance 3  Moderate Assistance   Toileting Assistance  4  Minimal Assistance   Functional Assistance 4  Minimal Assistance   Bed Mobility   Supine to Sit Unable to assess   Sit to Supine Unable to assess   Additional Comments Upon arrival, pt found in bathroom; @ end of session, pt left sitting upright in recliner with family present in room and chair alarm activated   Transfers   Sit to Stand 4  Minimal assistance   Additional items Assist x 1;Increased time " required;Verbal cues   Stand to Sit 4  Minimal assistance   Additional items Assist x 1;Increased time required;Verbal cues   Toilet transfer 4  Minimal assistance   Additional items Assist x 1;Verbal cues;Standard toilet   Additional Comments w/ varying HHA vs RW for increased support/safety however noted pt with decreased  strength for L hand, unable to safely /manipulate RW   Functional Mobility   Functional Mobility 4  Minimal assistance   Additional Comments Pt performing household functional mobility distances <> bathroom + additional household functional mobility distances with Min A x1 w/ varying HHA vs RW for safety and support   Additional items Rolling walker   Balance   Static Sitting Fair +   Dynamic Sitting Fair   Static Standing Fair -   Dynamic Standing Poor +   Ambulatory Poor +   Activity Tolerance   Activity Tolerance Patient tolerated treatment well;Patient limited by fatigue   Medical Staff Made Aware GUSTAVO Wang   Nurse Made Aware RN cleared   RUE Assessment   RUE Assessment WFL   LUE Assessment   LUE Assessment (S)  X  (Grossly 2-/5 MMT, decreased AROM shoulder flexion, able to lift approx 60 degrees)   Hand Function   Gross Motor Coordination Impaired   Fine Motor Coordination Impaired   Hand Function Comments LUE > RUE; decreased L hand strength/; poor coordination/dexterity in L hand, unable to reach and  hair brush and bring to head to brush hair with LUE   Sensation   Light Touch No apparent deficits   Vision-Basic Assessment   Current Vision Wears glasses all the time   Visual History Cataracts   Vision - Complex Assessment   Ocular Range of Motion Intact   Additional Comments Reporting no new changes in vision   Cognition   Overall Cognitive Status WFL   Arousal/Participation Alert;Responsive;Arousable;Cooperative   Attention Within functional limits   Orientation Level Oriented X4   Memory Decreased recall of precautions;Decreased recall of recent events   Following  Commands Follows one step commands without difficulty   Comments Pt very pleasant and cooperative; per family report, pt reporting some inconsistent information (falls, assist level) however able to follow all commands throughout session; very motivated and agreeable to participate in therapy; values her independence   Assessment   Limitation Decreased ADL status;Decreased UE ROM;Decreased UE strength;Decreased Safe judgement during ADL;Decreased endurance;Decreased fine motor control;Decreased self-care trans;Decreased high-level ADLs   Prognosis Fair   Assessment Pt is a 75 yo female who presented to [facility] s/p fall with worsening LUE/LLE weakness in which pt dx w/ stroke-like symptoms. Pt  has a past medical history of DILLON (acute kidney injury) ([facility]) (02/14/2025), Allergic (2022), Cancer ([facility]), Cancer (HCC), Cancer (HCC), Cataract (Nov. 2023), Essential thrombocytosis ([facility]), History of transfusion, Hyperlipidemia (2015), Hypertension (2011), Mass in chest, Nodular goiter, Osteoporosis, Peripheral neuropathy, Pneumonia (Oct 16, 2023), Psoriasis, and SIRS (systemic inflammatory response syndrome) ([facility]) (06/22/2024). Pt with active OT orders in which OT consulted to assess pt's functional status and occupational performance to determine safe d/c needs. Pt seen with PT to increase safety, decrease fall risk, and maximize functional/occupational performance 2* medical complexity which is a regression from pt's functional baseline. Pt lives alone in a [facility] with 6 HARRY. PTA, pt has been requiring increased assistance with ADLs/IADLs and uses no DME for functional mobility. (-) driving. Currently, pt performing functional transfers/mobility w/ Min A x1 w/ RW vs HHA, UB ADLs w/ Min A, and LB ADLs w/ Mod A. Pt demonstrates the following limitations/impairments which impact the pt's ability to engage in valued occupations: functional ROM, coordination, fine motor skills, dexterity,  strength, balance,  endurance/activity tolerance, standing tolerance, functional reach, postural/trunk control, strength, safety awareness, and pain. From an OT standpoint, recommend discharge w/ Level 1 resources once medically stable. The patient's raw score on the AM-PAC Daily Activity Inpatient Short Form is 16. A raw score of less than 19 suggests the patient may benefit from discharge to post-acute rehabilitation services. Please refer to the recommendation of the Occupational Therapist for safe discharge planning.  Pt would benefit from skilled OT services 3-5x/wk to address acute care needs and underlying performance skills to promote safety, decrease fall risk, and enhance occupational performance to return to [facility]. Goals to be met within the next 10-14 days.         CARE SCORES:  Self Care:  Eatin: Supervision or touching  assistance  Oral hygiene: 03: Partial/moderate assistance  Shower/bathing self: 03: Partial/moderate assistance  Upper body dressin: Partial/moderate assistance  Lower body dressin: Partial/moderate assistance  Putting on/taking off footwear: 03: Partial/moderate assistance  Toilet hygiene: 03: Partial/moderate assistance   Transfers:  Roll left and right: 03: Partial/moderate assistance  Sit to lyin: Partial/moderate assistance  Lying to sitting on side of bed: 03: Partial/moderate assistance  Sit to stand: 03: Partial/moderate assistance  Chair/bed to chair transfer: 03: Partial/moderate assistance  Toilet transfer: 09: Not applicable  Mobility:  Walk 10 ft: 03: Partial/moderate assistance  Walk 50 ft with two turns: 03: Partial/moderate assistance  Walk 150ft: 88: Not attempted due to medical conditions or safety concerns    CURRENT GAP IN FUNCTION  Prior to Admission: Independent with ADLs;Independent with functional mobility;Needs assistance with IADLS  (Recently has needed assist for ADLs)    Expected functional outcomes: It is expected that with skilled acute rehabilitation  services the patient will progress to Minimal Assistance for self care and Independent for mobility     Estimated length of stay: 10 to 14 days    Anticipated Post-Discharge Disposition/Treatment  Disposition: Return to previous home/apartment.  Outpatient Services: Physical Therapy (PT) and Occupational Therapy (OT)    BARRIERS TO DISCHARGE  Weakness, Pain, Balance Difficulty, Fatigue, Home Accessibility, Caregiver Accessibility, Equipment Needs, and Lives Alone    INTERVENTIONS FOR DISCHARGE  Adaptive equipment, Patient/Family/Caregiver Education, Community Resources, Support Group, Arrange DME needs, Therapy exercises, and Energy conservation education     REQUIRED THERAPY:  Patient will require PT and OT 90 minutes each per day, five days per week to achieve rehab goals.     REQUIRED FUNCTIONAL AND MEDICAL MANAGEMENT FOR INPATIENT REHABILITATION:  Skin:  monitor skin for breakdown, Deep Vein Thrombosis (DVT) Prophylaxis:  per MD orders, further internal medicine management of additional medical conditions while on ARC, PT/OT intervention, patient/family education and training, and any needed consults PRN.    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 Recent admission on 5/5 and discharged on 5/7 for SLS and found to have infarct in R MCA territory.  Continue with aspirin and statin, Xarelto discontinued in April 2025 after new foci of hemorrhage within the left occipital metastasis.  Vasogenic brain edema, maintained on dexamethasone. A brain MRI was done last week that showed a slight increase in the left occipital brain metastasis with vasogenic edema.  She was already on Decadron 2 mg daily, for which we  increased transiently her p.o. steroids with a short taper to come back down to 4 mg daily.  Radiation oncology evaluated her at that time and did not deem her to need any radiation therapy at this time and to return for an appointment in June. Atraumatic workup was negative in the ED. Low concern for infectious or metabolic etiology, no acute traumatic injury noted on imaging.  Likely related to increasing burden of metastasis or recrudescence. 5/13/25. Vitals are stable and roughly WNL.  Labs show persistent leukocytosis of 12.9, Hb 9.3, , and roughly normal CMP.  MRI brain showed stable left superior occipital lobe and left parietal lobe lesions with surrounding vasogenic edema, no new lesions, and stable right posterior frontal lobe sequela of subacute ischemia.  CTA head/neck was negative for carotid or vertebral arterial stenosis.  CT CAP was significant for disease progression including several new and enlarging lesions including progression of a likely metastatic right renal mass concerning for probable involvement of collecting system. Oncology was consulted for recommendations for dexamethasone and inpatient chemotherapy. Recommend not tapering below dexamethasone 6 mg daily at this time.  Okay to change from 2 mg every 8 hours to 3 mg every 12 hours when she discharges for rehab. Hold cycle 2 of reduced-dose docetaxel until outpatient follow up after rehab   Pt was independent PTA with transfers, amb, and recently assist with ADLs. Pt lives alone but has supportive family and HHA 6days/wk 5hrs a day in a 2 story home with 6 HARRY. Pt is currently functioning below baseline needing up to Mod A for ADLs and Min A for transfers/amb. Pt would benefit from ARC admission to have close medical management while participating in 3 hours of therapy per day that will include physical and occupational therapy.  PM&R to maximize function and provide medical oversight. The MD will monitor patient co-morbidities  while on the unit as well as order any additional tests, labs, and consults needed. The [facility] specialized interdisciplinary team will meet weekly to discuss patient overall medical status and rehab goals in preparation for D/C home. Inpatient acute rehab is recommended for patient to maximize overall strength, endurance, self care, and mobility for a safe and timely transition back home.              [1]   Current Facility-Administered Medications:     acetaminophen (TYLENOL) tablet 975 mg, 975 mg, Oral, Q6H [facility], ZULEYMA Mandujano, 975 mg at 05/14/25 1317    aluminum-magnesium hydroxide-simethicone (MAALOX) oral suspension 30 mL, 30 mL, Oral, Q4H PRN, Greg Jarrett MD    amLODIPine (NORVASC) tablet 5 mg, 5 mg, Oral, Daily, Greg Jarrett MD, 5 mg at 05/14/25 0901    aspirin chewable tablet 81 mg, 81 mg, Oral, Daily, Greg Jarrett MD, 81 mg at 05/14/25 0901    atorvastatin (LIPITOR) tablet 40 mg, 40 mg, Oral, Daily With Dinner, Greg Jarrett MD, 40 mg at 05/13/25 1620    cyanocobalamin (VITAMIN B-12) tablet 1,000 mcg, 1,000 mcg, Oral, Daily, Greg Jarrett MD, 1,000 mcg at 05/14/25 0901    dexamethasone (DECADRON) tablet 2 mg, 2 mg, Oral, Q8H KRISS, Greg Jarrett MD, 2 mg at 05/14/25 1317    Diclofenac Sodium (VOLTAREN) 1 % topical gel 2 g, 2 g, Topical, 4x Daily, Greg Jarrett MD, 2 g at 05/14/25 1317    enoxaparin (LOVENOX) subcutaneous injection 40 mg, 40 mg, Subcutaneous, Q24H, Greg Jarrett MD    gabapentin (NEURONTIN) capsule 200 mg, 200 mg, Oral, Daily, 200 mg at 05/14/25 0901 **AND** gabapentin (NEURONTIN) capsule 300 mg, 300 mg, Oral, HS, Greg Jarrett MD, 300 mg at 05/13/25 2133    HYDROmorphone (DILAUDID) tablet 2 mg, 2 mg, Oral, Q4H PRN **OR** HYDROmorphone (DILAUDID) tablet 4 mg, 4 mg, Oral, Q4H PRN, ZULEYMA Mandujano    HYDROmorphone HCl (DILAUDID) injection 0.2 mg, 0.2 mg, Intravenous, Q4H PRN, ZULEYMA Mandujano    levETIRAcetam (KEPPRA) tablet 1,000 mg, 1,000 mg, Oral, BID, Greg Jarrett MD, 1,000 mg at  05/14/25 0901    levothyroxine tablet 50 mcg, 50 mcg, Oral, Early Morning, Greg Jarrett MD, 50 mcg at 05/14/25 0519    lidocaine (LIDODERM) 5 % patch 1 patch, 1 patch, Topical, Daily, Greg Jarrett MD, 1 patch at 05/14/25 0901    melatonin tablet 3 mg, 3 mg, Oral, HS, Greg Jarrett MD, 3 mg at 05/13/25 2135    methocarbamol (ROBAXIN) tablet 250 mg, 250 mg, Oral, Q6H PRN, ZULEYMA Mandujano, 250 mg at 05/14/25 0905    naloxone (NARCAN) 0.04 mg/mL syringe 0.04 mg, 0.04 mg, Intravenous, Q1MIN PRN, ZULEYMA Mandujano    ondansetron (ZOFRAN-ODT) dispersible tablet 4 mg, 4 mg, Oral, Q6H PRN, Greg Jarrett MD    pantoprazole (PROTONIX) EC tablet 40 mg, 40 mg, Oral, Daily Before Breakfast, Greg Jarrett MD, 40 mg at 05/14/25 0519    QUEtiapine (SEROquel) tablet 12.5 mg, 12.5 mg, Oral, HS, Greg Jarrett MD, 12.5 mg at 05/13/25 2133    senna (SENOKOT) tablet 8.6 mg, 1 tablet, Oral, Daily, Greg Jarrett MD, 8.6 mg at 05/14/25 0901    sulfamethoxazole-trimethoprim (BACTRIM DS) 800-160 mg per tablet 1 tablet, 1 tablet, Oral, Once per day on Monday Wednesday Friday, Greg Jarrett MD, 1 tablet at 05/14/25 0900

## 2025-05-14 NOTE — ASSESSMENT & PLAN NOTE
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  See HPI for full oncology treatment history, but is most recently s/p C1 (4/3/2025) of monthly docetaxel with C2 held for hospitalizations.  MRI brain shows stable lesions, vasogenic edema, and subacute CVA lesions.  CT CAP is significant for disease progression.  Oncology was consulted for recommendations for dexamethasone and inpatient chemotherapy.    Recommend not tapering below dexamethasone 6 mg daily at this time.  Okay to change from 2 mg every 8 hours to 3 mg every 12 hours when she discharges for rehab.  Hold cycle 2 of reduced-dose docetaxel until outpatient follow up after rehab.  No barriers to discharge from an oncology perspective.  Patient is newly agreeable to rehab.  Recommend outpatient follow-up with Dr. Castro after rehab.

## 2025-05-15 ENCOUNTER — TRANSITIONAL CARE MANAGEMENT (OUTPATIENT)
Age: 75
End: 2025-05-15

## 2025-05-15 ENCOUNTER — HOSPITAL ENCOUNTER (OUTPATIENT)
Dept: INFUSION CENTER | Facility: HOSPITAL | Age: 75
End: 2025-05-15
Attending: INTERNAL MEDICINE

## 2025-05-15 ENCOUNTER — HOSPITAL ENCOUNTER (INPATIENT)
Facility: HOSPITAL | Age: 75
LOS: 15 days | Discharge: NURSING, REHAB OR LONG-TERM CARE | DRG: 949 | End: 2025-05-30
Attending: PHYSICAL MEDICINE & REHABILITATION | Admitting: PHYSICAL MEDICINE & REHABILITATION
Payer: MEDICARE

## 2025-05-15 VITALS
HEIGHT: 65 IN | SYSTOLIC BLOOD PRESSURE: 120 MMHG | DIASTOLIC BLOOD PRESSURE: 78 MMHG | OXYGEN SATURATION: 95 % | TEMPERATURE: 97.9 F | RESPIRATION RATE: 16 BRPM | BODY MASS INDEX: 19.28 KG/M2 | HEART RATE: 81 BPM | WEIGHT: 115.74 LBS

## 2025-05-15 DIAGNOSIS — Z91.89 AT RISK FOR VENOUS THROMBOEMBOLISM (VTE): ICD-10-CM

## 2025-05-15 DIAGNOSIS — G89.3 CANCER ASSOCIATED PAIN: ICD-10-CM

## 2025-05-15 DIAGNOSIS — G93.6 CEREBRAL EDEMA (HCC): ICD-10-CM

## 2025-05-15 DIAGNOSIS — Z91.89 AT RISK FOR CONSTIPATION: ICD-10-CM

## 2025-05-15 DIAGNOSIS — R31.9 PAINLESS HEMATURIA: ICD-10-CM

## 2025-05-15 DIAGNOSIS — E03.9 HYPOTHYROIDISM, UNSPECIFIED TYPE: ICD-10-CM

## 2025-05-15 DIAGNOSIS — K21.9 GERD (GASTROESOPHAGEAL REFLUX DISEASE): ICD-10-CM

## 2025-05-15 DIAGNOSIS — C79.31 LUNG CANCER METASTATIC TO BRAIN (HCC): ICD-10-CM

## 2025-05-15 DIAGNOSIS — Z86.19 HISTORY OF PNEUMOCYSTIS JIROVECII PNEUMONIA: ICD-10-CM

## 2025-05-15 DIAGNOSIS — I10 HTN (HYPERTENSION): ICD-10-CM

## 2025-05-15 DIAGNOSIS — C34.90 LUNG CANCER METASTATIC TO BRAIN (HCC): ICD-10-CM

## 2025-05-15 DIAGNOSIS — G47.9 SLEEP DISTURBANCE: ICD-10-CM

## 2025-05-15 DIAGNOSIS — I10 PRIMARY HYPERTENSION: Primary | ICD-10-CM

## 2025-05-15 DIAGNOSIS — N28.89 RIGHT RENAL MASS: ICD-10-CM
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
## 2025-05-15 PROBLEM — Z74.09 IMPAIRED MOBILITY AND ADLS: Status: ACTIVE | Noted: 2025-05-15

## 2025-05-15 PROBLEM — Z78.9 IMPAIRED MOBILITY AND ADLS: Status: ACTIVE | Noted: 2025-05-15

## 2025-05-15 LAB
ANISOCYTOSIS BLD QL SMEAR: PRESENT
BASOPHILS # BLD MANUAL: 0 THOUSAND/UL (ref 0–0.1)
BASOPHILS NFR MAR MANUAL: 0 % (ref 0–1)
EOSINOPHIL # BLD MANUAL: 0 THOUSAND/UL (ref 0–0.4)
EOSINOPHIL NFR BLD MANUAL: 0 % (ref 0–6)
ERYTHROCYTE [DISTWIDTH] IN BLOOD BY AUTOMATED COUNT: 16.7 % (ref 11.6–15.1)
HCT VFR BLD AUTO: 31.2 % (ref 34.8–46.1)
HGB BLD-MCNC: 9.4 G/DL (ref 11.5–15.4)
LYMPHOCYTES # BLD AUTO: 0.59 THOUSAND/UL (ref 0.6–4.47)
LYMPHOCYTES # BLD AUTO: 4 % (ref 14–44)
MCH RBC QN AUTO: 28 PG (ref 26.8–34.3)
MCHC RBC AUTO-ENTMCNC: 30.1 G/DL (ref 31.4–37.4)
MCV RBC AUTO: 93 FL (ref 82–98)
METAMYELOCYTE ABSOLUTE CT: 0.59 THOUSAND/UL (ref 0–0.1)
METAMYELOCYTES NFR BLD MANUAL: 4 % (ref 0–1)
MICROCYTES BLD QL AUTO: PRESENT
MONOCYTES # BLD AUTO: 0.3 THOUSAND/UL (ref 0–1.22)
MONOCYTES NFR BLD: 2 % (ref 4–12)
MYELOCYTE ABSOLUTE CT: 0.59 THOUSAND/UL (ref 0–0.1)
MYELOCYTES NFR BLD MANUAL: 4 % (ref 0–1)
NEUTROPHILS # BLD MANUAL: 12.69 THOUSAND/UL (ref 1.85–7.62)
NEUTS SEG NFR BLD AUTO: 86 % (ref 43–75)
PLATELET # BLD AUTO: 318 THOUSANDS/UL (ref 149–390)
PLATELET BLD QL SMEAR: ABNORMAL
PMV BLD AUTO: 9.7 FL (ref 8.9–12.7)
POIKILOCYTOSIS BLD QL SMEAR: PRESENT
POLYCHROMASIA BLD QL SMEAR: PRESENT
RBC # BLD AUTO: 3.36 MILLION/UL (ref 3.81–5.12)
RBC MORPH BLD: PRESENT
WBC # BLD AUTO: 14.75 THOUSAND/UL (ref 4.31–10.16)

## 2025-05-15 PROCEDURE — 99222 1ST HOSP IP/OBS MODERATE 55: CPT | Performed by: INTERNAL MEDICINE

## 2025-05-15 PROCEDURE — 99239 HOSP IP/OBS DSCHRG MGMT >30: CPT

## 2025-05-15 PROCEDURE — NC001 PR NO CHARGE: Performed by: PHYSICAL MEDICINE & REHABILITATION

## 2025-05-15 PROCEDURE — G0316 PR PROLONG INPT EVAL ADD15 M: HCPCS | Performed by: PHYSICAL MEDICINE & REHABILITATION

## 2025-05-15 PROCEDURE — 99223 1ST HOSP IP/OBS HIGH 75: CPT | Performed by: PHYSICAL MEDICINE & REHABILITATION

## 2025-05-15 PROCEDURE — 85007 BL SMEAR W/DIFF WBC COUNT: CPT

## 2025-05-15 PROCEDURE — 85027 COMPLETE CBC AUTOMATED: CPT

## 2025-05-15 RX ORDER — LEVOTHYROXINE SODIUM 50 UG/1
50 TABLET ORAL
Status: DISCONTINUED | OUTPATIENT
Start: 2025-05-16 | End: 2025-05-30 | Stop reason: HOSPADM

## 2025-05-15 RX ORDER — QUETIAPINE FUMARATE 25 MG/1
12.5 TABLET, FILM COATED ORAL
Status: DISCONTINUED | OUTPATIENT
Start: 2025-05-15 | End: 2025-05-17

## 2025-05-15 RX ORDER — ENOXAPARIN SODIUM 100 MG/ML
40 INJECTION SUBCUTANEOUS EVERY 24 HOURS
Status: DISCONTINUED | OUTPATIENT
Start: 2025-05-16 | End: 2025-05-30 | Stop reason: HOSPADM

## 2025-05-15 RX ORDER — LIDOCAINE 50 MG/G
1 PATCH TOPICAL DAILY
Status: ON HOLD
Start: 2025-05-16

## 2025-05-15 RX ORDER — ACETAMINOPHEN 325 MG/1
975 TABLET ORAL EVERY 6 HOURS SCHEDULED
Status: DISCONTINUED | OUTPATIENT
Start: 2025-05-15 | End: 2025-05-16

## 2025-05-15 RX ORDER — LIDOCAINE 50 MG/G
1 PATCH TOPICAL DAILY
Status: DISCONTINUED | OUTPATIENT
Start: 2025-05-16 | End: 2025-05-30 | Stop reason: HOSPADM

## 2025-05-15 RX ORDER — METHOCARBAMOL 500 MG/1
250 TABLET, FILM COATED ORAL EVERY 6 HOURS PRN
Status: ON HOLD
Start: 2025-05-15

## 2025-05-15 RX ORDER — SULFAMETHOXAZOLE AND TRIMETHOPRIM 800; 160 MG/1; MG/1
1 TABLET ORAL 3 TIMES WEEKLY
Status: DISCONTINUED | OUTPATIENT
Start: 2025-05-16 | End: 2025-05-30 | Stop reason: HOSPADM

## 2025-05-15 RX ORDER — PANTOPRAZOLE SODIUM 40 MG/1
40 TABLET, DELAYED RELEASE ORAL
Status: DISCONTINUED | OUTPATIENT
Start: 2025-05-16 | End: 2025-05-30 | Stop reason: HOSPADM

## 2025-05-15 RX ORDER — DEXAMETHASONE 1.5 MG/1
3 TABLET ORAL 2 TIMES DAILY
Status: ON HOLD
Start: 2025-05-15

## 2025-05-15 RX ORDER — METHOCARBAMOL 500 MG/1
250 TABLET, FILM COATED ORAL EVERY 6 HOURS PRN
Status: DISCONTINUED | OUTPATIENT
Start: 2025-05-15 | End: 2025-05-30 | Stop reason: HOSPADM

## 2025-05-15 RX ORDER — LEVETIRACETAM 500 MG/1
1000 TABLET ORAL 2 TIMES DAILY
Status: DISCONTINUED | OUTPATIENT
Start: 2025-05-15 | End: 2025-05-30 | Stop reason: HOSPADM

## 2025-05-15 RX ORDER — SENNOSIDES 8.6 MG
1 TABLET ORAL DAILY
Status: DISCONTINUED | OUTPATIENT
Start: 2025-05-16 | End: 2025-05-25

## 2025-05-15 RX ORDER — DEXAMETHASONE 2 MG/1
3 TABLET ORAL 2 TIMES DAILY
Status: DISCONTINUED | OUTPATIENT
Start: 2025-05-15 | End: 2025-05-15 | Stop reason: HOSPADM

## 2025-05-15 RX ORDER — HYDROMORPHONE HYDROCHLORIDE 2 MG/1
3 TABLET ORAL EVERY 6 HOURS PRN
Refills: 0 | Status: DISCONTINUED | OUTPATIENT
Start: 2025-05-15 | End: 2025-05-15

## 2025-05-15 RX ORDER — MAGNESIUM HYDROXIDE/ALUMINUM HYDROXICE/SIMETHICONE 120; 1200; 1200 MG/30ML; MG/30ML; MG/30ML
30 SUSPENSION ORAL EVERY 4 HOURS PRN
Status: DISPENSED | OUTPATIENT
Start: 2025-05-15 | End: 2025-05-25

## 2025-05-15 RX ORDER — ATORVASTATIN CALCIUM 40 MG/1
40 TABLET, FILM COATED ORAL
Status: DISCONTINUED | OUTPATIENT
Start: 2025-05-15 | End: 2025-05-30 | Stop reason: HOSPADM

## 2025-05-15 RX ORDER — ASPIRIN 81 MG/1
81 TABLET, CHEWABLE ORAL DAILY
Status: DISCONTINUED | OUTPATIENT
Start: 2025-05-16 | End: 2025-05-30 | Stop reason: HOSPADM

## 2025-05-15 RX ORDER — GABAPENTIN 100 MG/1
200 CAPSULE ORAL DAILY
Status: DISCONTINUED | OUTPATIENT
Start: 2025-05-16 | End: 2025-05-20

## 2025-05-15 RX ORDER — GABAPENTIN 300 MG/1
300 CAPSULE ORAL
Status: DISCONTINUED | OUTPATIENT
Start: 2025-05-15 | End: 2025-05-20

## 2025-05-15 RX ORDER — ACETAMINOPHEN 325 MG/1
975 TABLET ORAL EVERY 6 HOURS SCHEDULED
Status: ON HOLD
Start: 2025-05-15

## 2025-05-15 RX ORDER — AMLODIPINE BESYLATE 5 MG/1
5 TABLET ORAL DAILY
Status: DISCONTINUED | OUTPATIENT
Start: 2025-05-16 | End: 2025-05-19

## 2025-05-15 RX ORDER — HYDROMORPHONE HYDROCHLORIDE 2 MG/1
2 TABLET ORAL EVERY 6 HOURS PRN
Refills: 0 | Status: DISCONTINUED | OUTPATIENT
Start: 2025-05-15 | End: 2025-05-15

## 2025-05-15 RX ADMIN — ACETAMINOPHEN 975 MG: 325 TABLET, FILM COATED ORAL at 23:25

## 2025-05-15 RX ADMIN — GABAPENTIN 300 MG: 300 CAPSULE ORAL at 21:00

## 2025-05-15 RX ADMIN — DEXAMETHASONE 3 MG: 0.5 TABLET ORAL at 20:09

## 2025-05-15 RX ADMIN — QUETIAPINE FUMARATE 12.5 MG: 25 TABLET ORAL at 21:00

## 2025-05-15 RX ADMIN — LEVETIRACETAM 1000 MG: 500 TABLET, FILM COATED ORAL at 08:58

## 2025-05-15 RX ADMIN — DICLOFENAC SODIUM 2 G: 10 GEL TOPICAL at 09:03

## 2025-05-15 RX ADMIN — METHOCARBAMOL 250 MG: 500 TABLET ORAL at 09:55

## 2025-05-15 RX ADMIN — DEXAMETHASONE 2 MG: 2 TABLET ORAL at 06:04

## 2025-05-15 RX ADMIN — AMLODIPINE BESYLATE 5 MG: 5 TABLET ORAL at 08:58

## 2025-05-15 RX ADMIN — ACETAMINOPHEN 975 MG: 325 TABLET, FILM COATED ORAL at 18:06

## 2025-05-15 RX ADMIN — LIDOCAINE 1 PATCH: 50 PATCH CUTANEOUS at 08:59

## 2025-05-15 RX ADMIN — CYANOCOBALAMIN TAB 500 MCG 1000 MCG: 500 TAB at 08:58

## 2025-05-15 RX ADMIN — ALUMINUM HYDROXIDE, MAGNESIUM HYDROXIDE, AND DIMETHICONE 30 ML: 200; 20; 200 SUSPENSION ORAL at 20:10

## 2025-05-15 RX ADMIN — ACETAMINOPHEN 975 MG: 325 TABLET, FILM COATED ORAL at 06:04

## 2025-05-15 RX ADMIN — DICLOFENAC SODIUM 2 G: 10 GEL TOPICAL at 20:10

## 2025-05-15 RX ADMIN — Medication 3 MG: at 21:00

## 2025-05-15 RX ADMIN — SENNOSIDES 8.6 MG: 8.6 TABLET, FILM COATED ORAL at 08:58

## 2025-05-15 RX ADMIN — LEVETIRACETAM 1000 MG: 500 TABLET, FILM COATED ORAL at 20:10

## 2025-05-15 RX ADMIN — PANTOPRAZOLE SODIUM 40 MG: 40 TABLET, DELAYED RELEASE ORAL at 06:04

## 2025-05-15 RX ADMIN — ASPIRIN 81 MG: 81 TABLET, CHEWABLE ORAL at 08:59

## 2025-05-15 RX ADMIN — ACETAMINOPHEN 975 MG: 325 TABLET, FILM COATED ORAL at 13:17

## 2025-05-15 RX ADMIN — ATORVASTATIN CALCIUM 40 MG: 40 TABLET, FILM COATED ORAL at 18:06

## 2025-05-15 RX ADMIN — LEVOTHYROXINE SODIUM 50 MCG: 0.05 TABLET ORAL at 06:04

## 2025-05-15 RX ADMIN — GABAPENTIN 200 MG: 100 CAPSULE ORAL at 08:58
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
## 2025-05-15 NOTE — ASSESSMENT & PLAN NOTE
Recent Labs     05/13/25  0436 05/14/25  0455 05/15/25  0510   HGB 9.7* 9.3* 9.4*     Anemia of chronic disease.  Hemoglobin baseline 9-10.  Continue to monitor.

## 2025-05-15 NOTE — ASSESSMENT & PLAN NOTE
On chemotherapy, last chemotherapy was approximately 6-8 weeks back-carboplatin and paclitaxel  Follows up with Dr. Castro and radiation oncology  S/p radiation therapy from July to September, was on Keytruda which was stopped secondary to pneumonitis, on tapering dose of Decadron,   Has had stereotactic radiation to brain twice-in August 2024 in March 2025.

## 2025-05-15 NOTE — ASSESSMENT & PLAN NOTE
HPI:   Diagnosed in 07/2024, s/p radiation in 08/2024 and 01/2025. Treatments complicated by toxicities and infection.   Most recently receiving Taoxtere with plans to start chemotherapy after discharge   MRI brain showed stable enhancing lesions within the L superior occipital lobe and L parietal lobe with surrounding vasogenic edema, stable foci of restricted diffusion within the R posterior frontal lobe most likely sequela of subacute ischemia.  CT CAP showed multiple lesions with increase in size.    Plan:   Continue increased dose of dexamethasone 3mg BID.  Continue Bactrim -160mg MWF  Continue Keppra 1000mg BID  See pain insertion   Follows with Dr. Castro (Oncology), Dr Vuong (Radiation/Oncology), and Palliative as outpatient  Monitor for new or worsening pain, decreased ROM,  changes in strength, sensation, balance, and mentation,increased fatigue, SOB, edema, abdominal pain, nausea, vomiting, constipation, wt loss, worsening intake/appetite

## 2025-05-15 NOTE — ASSESSMENT & PLAN NOTE
Most recent CTA chest/PE study in 03/2025 showed no PE.  Had been on Xarelto in the past but discontinued due to new foci of hemorrhage within the L occipital metastasis.  Monitor for s/s of reoccurrence.

## 2025-05-15 NOTE — ASSESSMENT & PLAN NOTE
Presented on 5/12/25 after experiencing mechanical fall with head strike.  Hx of L sided weakness s/p stroke in 04/2025.  CTH showed vasogenic edema L parietal vertex compatible with known brain metastasis, no hemorrhage identified.  MRI brain showed stable enhancing lesions within the L superior occipital lobe and L parietal lobe with surrounding vasogenic edema, stable foci of restricted diffusion within the R posterior frontal lobe most likely sequela of subacute ischemia.  Per Neurology, symptoms likely due to some increase in size of CNS metastasis on top of noted vasogenic edema.  Home dexamethasone increased from 2mg BID to 3mg BID

## 2025-05-15 NOTE — PLAN OF CARE
Problem: SAFETY ADULT  Goal: Patient will remain free of falls  Description: INTERVENTIONS:  - Educate patient/family on patient safety including physical limitations  - Instruct patient to call for assistance with activity   - Consult OT/PT to assist with strengthening/mobility   - Keep Call bell within reach  - Keep bed low and locked with side rails adjusted as appropriate  - Keep care items and personal belongings within reach  - Initiate and maintain comfort rounds  - Make Fall Risk Sign visible to staff  - Offer Toileting every 2 Hours, in advance of need  - Initiate/Maintain bed/chair alarm  - Obtain necessary fall risk management equipment: bed/chair sensor  - Apply yellow socks and bracelet for high fall risk patients  - Consider moving patient to room near nurses station  Outcome: Progressing  Goal: Maintain or return to baseline ADL function  Description: INTERVENTIONS:  -  Assess patient's ability to carry out ADLs; assess patient's baseline for ADL function and identify physical deficits which impact ability to perform ADLs (bathing, care of mouth/teeth, toileting, grooming, dressing, etc.)  - Assess/evaluate cause of self-care deficits   - Assess range of motion  - Assess patient's mobility; develop plan if impaired  - Assess patient's need for assistive devices and provide as appropriate  - Encourage maximum independence but intervene and supervise when necessary  - Involve family in performance of ADLs  - Assess for home care needs following discharge   - Consider OT consult to assist with ADL evaluation and planning for discharge  - Provide patient education as appropriate  - Monitor functional capacity and physical performance, use of AM PAC & JH-HLM   - Monitor gait, balance and fatigue with ambulation    Outcome: Progressing

## 2025-05-15 NOTE — CONSULTS
Consultation - Internal Medicine   Name: Ayla Nye 74 y.o. 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  Hx of radiation in 08/2024 and 01/2025.  Hx of hold on treatments due to multiple admission/toxicities/PCP pneumonia/PE.  Most recently receiving Taoxtere with plans to start chemotherapy after admission.    MRI brain showed stable enhancing lesions within the L superior occipital lobe and L parietal lobe with surrounding vasogenic edema, stable foci of restricted diffusion within the R posterior frontal lobe most likely sequela of subacute ischemia.  CT CAP showed multiple lesions with increase in size.    Continue increased dose of dexamethasone 3mg BID.  Continue Bactrim -160mg MWF.  Continue Keppra 1000mg BID.    Follows with Dr. Castro (Oncology), Dr Vuong (Radiation/Oncology), and Palliative as outpatient.  Follow-up with Neurology in 4-6 weeks as outpatient.  HTN (hypertension)  Continue home amlodipine 5mg daily.  Monitor BP with routine VS.  Insomnia  Continue melatonin 3mg at HS.  Hypothyroidism  Continue home levothyroxine 50mcg daily.  TSH 0.408 and T4 0.79 on 5/12.  Recommend repeat TSH in 6 weeks as outpatient.  Anemia  Hgb currently stable at 9.4.  Baseline 9-10.  Continue home cyanocobalamin 1000mcg daily.  Asymptomatic.  Continue to trend routine CBC.  Leukocytosis  WBC count currently 14.75.  No active s/s of infection.  Likely steroid induced.  Continue to trend routine CBC.  History of pulmonary embolism  Most recent CTA chest/PE study in 03/2025 showed no PE.  Had been on Xarelto in the past but  discontinued due to new foci of hemorrhage within the L occipital metastasis.  Monitor for s/s of reoccurrence.  GERD (gastroesophageal reflux disease)  Continue home Protonix 40mg daily.  Stroke-like symptoms  Presented on 5/12/25 after experiencing mechanical fall with head strike.  Hx of L sided weakness s/p stroke in 04/2025.  CTH showed vasogenic edema L parietal vertex compatible with known brain metastasis, no hemorrhage identified.  MRI brain showed stable enhancing lesions within the L superior occipital lobe and L parietal lobe with surrounding vasogenic edema, stable foci of restricted diffusion within the R posterior frontal lobe most likely sequela of subacute ischemia.    Per Neurology - symptoms likely due to some increase in size of CNS metastasis on top of noted vasogenic edema.  Home dexamethasone increased from 2mg BID to 3mg BID.    Neurovascular checks Q shift.  Follow-up with Neurology in 4-6 weeks as outpatient.    Primary team following.  PT/OT/SLP.  History of stroke  R frontal subacute infarct in 04/2025.  Continue ASA 81mg daily and Lipitor 40mg daily.  Continue with PT/OT.  Follow-up with Neurovascular in 4-6 weeks as outpatient.    History of Present Illness:    Ayla Nye is a 74 y.o. female with a PMH of lung adenocarcinoma with brain metastasis, hx of PE, prior stroke, HTN, hypothyroidism, and HLD who originally presented to [facility] on 5/12/25 due to a mechanical fall from ongoing left sided weakness, leading to head strike.  Patient had recently had a stroke with L sided weakness about 2 weeks prior and had been placed on steroids to assist with vasogenic edema.  CTH showed vasogenic edema L parietal vertex compatible with known brain metastasis, no hemorrhage identified.  Neurology was consulted and recommended obtaining MRI brain.  MRI brain showed stable enhancing lesions within the L superior occipital lobe and L parietal lobe with surrounding vasogenic edema  and stable foci of restricted diffusion within the R posterior frontal lobe, most likely sequela of subacute ischemia.  Neurology felt that symptoms were likely due to some increase in size of CNS metastasis on top of noted vasogenic edema.  Oncology was consulted and recommended increasing dexamethasone from 2mg to 3mg BID.  Recommending to hold chemotherapy until outpatient follow-up.  Will require follow-up with Neurovascular in 4-6 weeks.  Evaluated by PT/OT and recommended for acute inpatient rehabilitation.     Admitted to [facility] on 5/15/2025; we are consulted for medical clearance.        Pt examined while pt sitting in recliner in pt room.  Currently has no complaints of pain.  Denies any fevers, chills, headaches, lightheadedness, dizziness, visual changes, difficulty swallowing, SOB, cough, CP, or palpitations.  Denies any abdominal pain.  Had a BM today.  States that she takes senna at home.  Denies any urinary complaints.  Did not sleep well last night but has been sleeping well in the hospital prior with taking melatonin.  Has L sided weakness that she states started about 2 weeks ago after having a stroke.  Feels that her weakness has progressively worsened since then and does not feel that the steroids have started to help yet.  Lives at home by herself but does have a home health aid for a few hours for 6 days a week.  States that she can increase time for the home health aid but she wants to focus getting on stronger first.  Home health aid helps with managing her medications for her.  Has 2 daughters in the area that also come to help her at times.    Review of Systems:    Review of Systems   Constitutional:  Positive for fatigue. Negative for appetite change, chills and fever.   HENT:  Negative for trouble swallowing.    Eyes:  Negative for visual disturbance.   Respiratory:  Negative for cough, shortness of breath, wheezing and stridor.    Cardiovascular:  Negative for chest  pain, palpitations and leg swelling.   Gastrointestinal:  Negative for abdominal distention, abdominal pain, constipation, diarrhea, nausea and vomiting.        LBM 5/15   Genitourinary:  Negative for difficulty urinating, dysuria, frequency, hematuria and urgency.   Musculoskeletal:  Positive for gait problem. Negative for arthralgias and back pain.   Neurological:  Positive for weakness (L sided weakness, started 2 weeks ago, progressively worsening since stroke). Negative for dizziness, light-headedness and headaches.   Psychiatric/Behavioral:  Positive for sleep disturbance. Negative for dysphoric mood. The patient is not nervous/anxious.    All other systems reviewed and are negative.      Past Medical and Surgical History:     Past Medical History:   Diagnosis Date    DILLON (acute kidney injury) (HCC) 02/14/2025    Allergic 2022    Allergic to neomiacin and cephalexin    Arthritis     Cancer (HCC)     lung cancer    Cancer (HCC)     mets to brain    Cancer (HCC)     perianal mass    Cataract Nov. 2023    Due to have durgery June 2024    Disease of thyroid gland     Essential thrombocytosis (HCC)     controlled with medication    History of transfusion     Hyperlipidemia 2015    Hypertension 2011    Mass in chest     Nodular goiter     Osteoporosis     Peripheral neuropathy     Pneumonia Oct 16, 2023    Also  Feb 2024    Psoriasis     SIRS (systemic inflammatory response syndrome) (HCC) 06/22/2024    Stroke (HCC)        Past Surgical History:   Procedure Laterality Date    APPENDECTOMY  1954    BREAST CYST EXCISION Right     COLONOSCOPY  10/31/2018    DXA PROCEDURE (HISTORICAL)  04/21/2017    IR BIOPSY OTHER  09/12/2024    IR LUMBAR PUNCTURE  09/12/2024    MAMMO (HISTORICAL)      8-24-18    MAMMO NEEDLE LOCALIZATION RIGHT (ALL INC) Right 07/13/2009    SKIN LESION EXCISION      bridge of nose       Meds/Allergies:    all medications and allergies reviewed    Allergies: Allergies[1]    Social History:     Marital  "Status:     Substance Use History:   Social History     Substance and Sexual Activity   Alcohol Use Not Currently    Alcohol/week: 5.0 standard drinks of alcohol    Types: 5 Glasses of wine per week     Tobacco Use History[2]  Social History     Substance and Sexual Activity   Drug Use Never       Family History:  Reviewed    Physical Exam:     Vitals:   Blood Pressure: 144/84 (05/15/25 1530)  Pulse: 86 (05/15/25 1530)  Temperature: 98.8 °F (37.1 °C) (05/15/25 1530)  Temp Source: Tympanic (05/15/25 1530)  Respirations: 18 (05/15/25 1530)  Height: 5' 2\" (157.5 cm) (05/15/25 1530)  Weight - Scale: 53.4 kg (117 lb 12.8 oz) (05/15/25 1530)  SpO2: 93 % (05/15/25 1530)    Physical Exam  Vitals and nursing note reviewed.   Constitutional:       General: She is not in acute distress.     Appearance: Normal appearance. She is not ill-appearing.   HENT:      Head: Normocephalic and atraumatic.     Cardiovascular:      Rate and Rhythm: Normal rate and regular rhythm.      Pulses: Normal pulses.      Heart sounds: Normal heart sounds. No murmur heard.     No friction rub.   Pulmonary:      Effort: Pulmonary effort is normal. No respiratory distress.      Breath sounds: Examination of the left-lower field reveals decreased breath sounds. Decreased breath sounds present. No wheezing or rhonchi.   Abdominal:      General: Abdomen is flat. Bowel sounds are normal. There is no distension.      Palpations: Abdomen is soft. There is no mass.      Tenderness: There is no abdominal tenderness. There is no guarding or rebound.      Hernia: No hernia is present.     Musculoskeletal:      Cervical back: Normal range of motion and neck supple. No tenderness.      Right lower leg: No edema.      Left lower leg: No edema.     Skin:     General: Skin is warm and dry.      Capillary Refill: Capillary refill takes less than 2 seconds.     Neurological:      Mental Status: She is alert and oriented to person, place, and time.      Motor: " Weakness (LUE strength 4/5, L hand  weak) present.     Psychiatric:         Mood and Affect: Mood normal.         Behavior: Behavior normal.         Additional Data:     Labs and imaging reviewed.    EKG Reviewed - last EKG on 5/12/25 showed NSR, QTc 425.    [employer] software was used to dictate this note.  It may contain errors with dictating incorrect words or incorrect spelling. Please contact the provider directly with any questions.         [1]   Allergies  Allergen Reactions    Doxycycline Headache    Keflex [Cephalexin] Rash    Neomycin-Polymyxin-Dexameth Eye Swelling   [2]   Social History  Tobacco Use   Smoking Status Former    Current packs/day: 1.00    Average packs/day: 1 pack/day for 59.4 years (59.4 ttl pk-yrs)    Types: Cigarettes    Start date: 1966    Passive exposure: Past   Smokeless Tobacco Never   Tobacco Comments    Quit 2010

## 2025-05-15 NOTE — ASSESSMENT & PLAN NOTE
Hgb currently stable at 10.1.  Baseline 9-10.  Continue home cyanocobalamin 1000mcg daily.  Continue to monitor CBC

## 2025-05-15 NOTE — DISCHARGE SUMMARY
Discharge Summary - Hospitalist   Name: Ayla Nye 74 y.o. female I MRN: [id number]  Unit/Bed#: S -01 I Date of Admission: 5/12/2025   Date of Service: 5/15/2025 I Hospital Day: 3     Assessment & Plan  Stroke-like symptoms  Left-sided weakness  CC: Fall at home on aspirin.  Recent admission on 5/5 and discharged on 5/7 for SLS and found to have infarct in R MCA territory.  Continue with aspirin and statin, Xarelto discontinued in April 2025 after new foci of hemorrhage within the left occipital metastasis.  Vasogenic brain edema, maintained on dexamethasone.  CT CAP with contrast:  Evidence of disease progression with several new and enlarging lesions, new lesions in right lower lobe, right thigh intramuscular mass (R thigh mass contrast enhancing, possible hematoma however not superficial on imaging).  Redemonstrated heterogeneous right renal mass A/W urothelial enhancement, concerning for metastatic lesion with probable involvement of the collecting system.  CTA head and neck:  Redemonstrated vasogenic edema left parietal vertex.  No hemorrhage identified.  No significant carotid or vertebral artery stenosis, no focal intracranial stenosis or aneurysm.  Positive labs:  Negative labs: UA, troponin, CBC (baseline leukocytosis, ACD), CMP, LDL, A1c, glucose on admission, magnesium.  Low concern for infectious or metabolic etiology, no acute traumatic injury noted on imaging.  Likely related to increasing burden of metastasis or recrudescence.  5/13/25 - MRI brain wo contrast. - stable enhancing lesions in left occipital and parietal lobe surrounding vasogenic edema. No new enhancing lesions   TSH - 0.408, Free T4 - normal    Plan:  Palliative care consult:  Continuing to follow.  Neurology consult:  MRI stable.  Continue home aspirin, defer to oncology for steroid regimen, f/u outpatient with neuro in 4/6 weeks.  Oncology consult:  Continue dexamethasone at 6 mg total dosing, however at 3 mg twice daily  instead of 2 mg 3 times daily.  PT/OT evaluated - Level 1 - [facility].  Vasogenic brain edema (HCC)  2/2 to metastatic adenocarcinoma  Prior provider discussed with radiation oncology, recommend continue with Decadron taper, follow-up MRI outpatient, follow-up radiation oncology outpatient with Dr. Vuong.  During prior admission, no role for additional radiation 5/6/2025.  Plan:  See SLS  F/u with neurology outpatient.  HTN (hypertension)  Continue home amlodipine.  BP goal-normotensive.  IV labetalol as needed.  Mixed hyperlipidemia  Continue home atorvastatin.  Insomnia  Continue home melatonin.  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 Castro and radiation oncology  S/p radiation therapy from July to September, was on Keytruda which was stopped secondary to pneumonitis, on tapering dose of Decadron,   Has had stereotactic radiation to brain twice-in August 2024 in March 2025.  Hypothyroidism  Continue home levothyroxine 50 mcg daily.  Cancer associated pain  Continue home gabapentin, hydromorphone 2 mg every 4 hours as needed, acetaminophen 975 mg.  Patient states that her pain is well controlled on this regimen.  Palliative care patient  Follows with ZULEYMA Richards.  GERD (gastroesophageal reflux disease)  Continue home pantoprazole.  History of stroke  R frontal subacute infarct, noted on imaging on 04/21/25  C/w aspirin & statin.  Anemia  Recent Labs     05/13/25  0436 05/14/25  0455 05/15/25  0510   HGB 9.7* 9.3* 9.4*     Anemia of chronic disease.  Hemoglobin baseline 9-10.  Continue to monitor.  History of pulmonary embolism  Previously on Xarelto, which was d/c'd after a new foci of hemorrhage w/in left occipital metastasis during prior admission.  Aspirin daily.     Medical Problems       Resolved Problems  Date Reviewed: 5/14/2025   None        Discharging Physician / Practitioner: Greg Jarrett MD  PCP: Rafy Angelo MD  Admission Date:   Admission Orders (From admission, onward)       Ordered        05/12/25 1635  INPATIENT ADMISSION  Once                          Discharge Date: 05/15/25    Consultations During Hospital Stay:  IP CONSULT TO PALLIATIVE CARE  IP CONSULT TO NEUROLOGY  IP CONSULT TO ONCOLOGY    Procedures Performed:   None    Significant Findings / Test Results:   Redemonstrated heterogeneous right renal mass associated with urothelial enhancement, which is concerning for metastatic lesion with probable involvement of the collecting system, evidence of disease progression with several new and enlarging lesions with new lesion in the right lower lobe and a mass in the right thigh intramuscular which is contrast-enhancing (possible hematoma however not superficial on imaging and not palpable clinically).    Incidental Findings:   None    Test Results Pending at Discharge (will require follow up):   None     Outpatient Tests Requested:  None    Complications: None    Reason for Admission: Fall, left-sided weakness    Hospital Course:   Ayla Nye is a 74 y.o. female patient who originally presented to the hospital on 5/12/2025 due to    74-year-old F with a PMH significant for stage IV adenocarcinoma of the lung with mets to brain s/p chemo and radiation, on dexamethasone for vasogenic edema, prior PE, right frontal subacute infarct in April presenting with left-sided weakness.  With head strike, however no laceration or LOC.  No longer on Xarelto due to recent MRI with small focus of hemorrhage, continued on aspirin.  During her stay, MRI noted stable vasogenic edema compared to prior with no concern for acute stroke and neurology signing off and recommending outpatient follow-up in 4 to 6 weeks.  Oncology recommending continuing dexamethasone now at 3 mg twice daily, with follow-up s/p rehab.  Patient qualified for acute rehab, and  "was approved.  Planned discharge to [facility] with patient hopeful for continued chemotherapy following rehab, VSS on discharge.    Please see above list of diagnoses and related plan for additional information.     Condition at Discharge: fair    Discharge Day Visit / Exam:   Subjective: Feels well today, continue left-sided weakness however improved compared to admission  Vitals: Blood Pressure: 120/78 (05/15/25 0724)  Pulse: 81 (05/15/25 0724)  Temperature: 97.9 °F (36.6 °C) (05/15/25 0724)  Temp Source: Oral (05/14/25 1518)  Respirations: 16 (05/15/25 0724)  Height: 5' 5\" (165.1 cm) (05/12/25 1754)  Weight - Scale: 52.5 kg (115 lb 11.9 oz) (05/12/25 1754)  SpO2: 95 % (05/15/25 0724)  Physical Exam  Vitals and nursing note reviewed.   Constitutional:       General: She is not in acute distress.     Appearance: She is well-developed.   HENT:      Head: Normocephalic and atraumatic.     Eyes:      Conjunctiva/sclera: Conjunctivae normal.       Cardiovascular:      Rate and Rhythm: Normal rate and regular rhythm.      Heart sounds: No murmur heard.  Pulmonary:      Effort: Pulmonary effort is normal. No respiratory distress.      Breath sounds: Normal breath sounds.   Abdominal:      Palpations: Abdomen is soft.      Tenderness: There is no abdominal tenderness.     Musculoskeletal:         General: No swelling.      Cervical back: Neck supple.     Skin:     General: Skin is warm and dry.      Capillary Refill: Capillary refill takes less than 2 seconds.     Neurological:      Mental Status: She is alert and oriented to person, place, and time.      Comments: 4/5 LE b/l and 5/5 RE b/l   Psychiatric:         Mood and Affect: Mood normal.         Discussion with Family: Updated  (daughter) at bedside.    Discharge instructions/Information to patient and family:   See after visit summary for information provided to patient and family.      Provisions for Follow-Up Care:  See after visit " summary for information related to follow-up care and any pertinent home health orders.      Mobility at time of Discharge:   Basic Mobility Inpatient Raw Score: 17  JH-HLM Goal: 5: Stand one or more mins  JH-HLM Achieved: 7: Walk 25 feet or more  HLM Goal achieved. Continue to encourage appropriate mobility.     Disposition:   Acute Rehab at [facility]    Planned Readmission: No    Discharge Medications:  See after visit summary for reconciled discharge medications provided to patient and/or family.      Administrative Statements   Discharge Statement:  I have spent a total time of 30 minutes in caring for this patient on the day of the visit/encounter. >30 minutes of time was spent on: Diagnostic results, Impressions, Counseling / Coordination of care, Documenting in the medical record, Reviewing / ordering tests, medicine, procedures  , and Communicating with other healthcare professionals .    **Please Note: This note may have been constructed using a voice recognition system**

## 2025-05-15 NOTE — ASSESSMENT & PLAN NOTE
Most recent CTA chest in March 2025 negative for PE   Previously on Xarelto but discontinued due to new foci of hemorrhage within the L occipital metastasis (April 2025)   Monitor respiratory status

## 2025-05-15 NOTE — ASSESSMENT & PLAN NOTE
WBC count currently 14.75.  No active s/s of infection.  Likely steroid induced.  Continue to trend routine CBC.

## 2025-05-15 NOTE — ASSESSMENT & PLAN NOTE
Hgb currently stable at 9.4.  Baseline 9-10.  Continue home cyanocobalamin 1000mcg daily.  Asymptomatic.  Continue to trend routine CBC.

## 2025-05-15 NOTE — ASSESSMENT & PLAN NOTE
Diagnosed in 07/2024.  Hx of radiation in 08/2024 and 01/2025.  Hx of hold on treatments due to multiple admission/toxicities/PCP pneumonia/PE.  Most recently receiving Taoxtere with plans to start chemotherapy after admission.    MRI brain showed stable enhancing lesions within the L superior occipital lobe and L parietal lobe with surrounding vasogenic edema, stable foci of restricted diffusion within the R posterior frontal lobe most likely sequela of subacute ischemia.  CT CAP showed multiple lesions with increase in size.    Continue increased dose of dexamethasone 3mg BID.  Continue Bactrim -160mg MWF.  Continue Keppra 1000mg BID.    Follows with Dr. Castro (Oncology), Dr Vuong (Radiation/Oncology), and Palliative as outpatient.  Follow-up with Neurology in 4-6 weeks as outpatient.

## 2025-05-15 NOTE — ASSESSMENT & PLAN NOTE
CC: Fall at home on aspirin.  Recent admission on 5/5 and discharged on 5/7 for SLS and found to have infarct in R MCA territory.  Continue with aspirin and statin, Xarelto discontinued in April 2025 after new foci of hemorrhage within the left occipital metastasis.  Vasogenic brain edema, maintained on dexamethasone.  CT CAP with contrast:  Evidence of disease progression with several new and enlarging lesions, new lesions in right lower lobe, right thigh intramuscular mass (R thigh mass contrast enhancing, possible hematoma however not superficial on imaging).  Redemonstrated heterogeneous right renal mass A/W urothelial enhancement, concerning for metastatic lesion with probable involvement of the collecting system.  CTA head and neck:  Redemonstrated vasogenic edema left parietal vertex.  No hemorrhage identified.  No significant carotid or vertebral artery stenosis, no focal intracranial stenosis or aneurysm.  Positive labs:  Negative labs: UA, troponin, CBC (baseline leukocytosis, ACD), CMP, LDL, A1c, glucose on admission, magnesium.  Low concern for infectious or metabolic etiology, no acute traumatic injury noted on imaging.  Likely related to increasing burden of metastasis or recrudescence.  5/13/25 - MRI brain wo contrast. - stable enhancing lesions in left occipital and parietal lobe surrounding vasogenic edema. No new enhancing lesions   TSH - 0.408, Free T4 - normal    Plan:  Palliative care consult:  Continuing to follow.  Neurology consult:  MRI stable.  Continue home aspirin, defer to oncology for steroid regimen, f/u outpatient with neuro in 4/6 weeks.  Oncology consult:  Continue dexamethasone at 6 mg total dosing, however at 3 mg twice daily instead of 2 mg 3 times daily.  PT/OT evaluated - Level 1 - [facility].

## 2025-05-15 NOTE — PLAN OF CARE
Problem: NEUROSENSORY - ADULT  Goal: Achieves stable or improved neurological status  Description: INTERVENTIONS- Monitor and report changes in neurological status- Monitor vital signs such as temperature, blood pressure, glucose, and any other labs ordered - Initiate measures to prevent increased intracranial pressure- Monitor for seizure activity and implement precautions if appropriate    Outcome: Progressing  Goal: Remains free of injury related to seizures activity  Description: INTERVENTIONS- Maintain airway, patient safety  and administer oxygen as ordered- Monitor patient for seizure activity, document and report duration and description of seizure to physician/advanced practitioner- If seizure occurs,  ensure patient safety during seizure- Reorient patient post seizure- Seizure pads on all 4 side rails- Instruct patient/family to notify RN of any seizure activity including if an aura is experienced- Instruct patient/family to call for assistance with activity based on nursing assessment- Administer anti-seizure medications if ordered  Outcome: Progressing  Goal: Achieves maximal functionality and self care  Description: INTERVENTIONS- Monitor swallowing and airway patency with patient fatigue and changes in neurological status- Encourage and assist patient to increase activity and self care. - Encourage visually impaired, hearing impaired and aphasic patients to use assistive/communication devices  Outcome: Progressing     Problem: Prexisting or High Potential for Compromised Skin Integrity  Goal: Skin integrity is maintained or improved  Description: INTERVENTIONS:- Identify patients at risk for skin breakdown- Assess and monitor skin integrity- Assess and monitor nutrition and hydration status- Monitor labs - Assess for incontinence - Turn and reposition patient- Assist with mobility/ambulation- Relieve pressure over bony prominences- Avoid friction and shearing- Provide appropriate hygiene as needed  including keeping skin clean and dry- Evaluate need for skin moisturizer/barrier cream- Collaborate with interdisciplinary team - Patient/family teaching- Consider wound care consult   Outcome: Progressing      finger food

## 2025-05-15 NOTE — ASSESSMENT & PLAN NOTE
CC: Fall at home on aspirin.  Recent admission on 5/5 and discharged on 5/7 for SLS and found to have infarct in R MCA territory.  Continue with aspirin and statin, Xarelto discontinued in April 2025 after new foci of hemorrhage within the left occipital metastasis.  Vasogenic brain edema, maintained on dexamethasone.  CT CAP with contrast:  Evidence of disease progression with several new and enlarging lesions, new lesions in right lower lobe, right thigh intramuscular mass (R thigh mass contrast enhancing, possible hematoma however not superficial on imaging).  Redemonstrated heterogeneous right renal mass A/W urothelial enhancement, concerning for metastatic lesion with probable involvement of the collecting system.  CTA head and neck:  Redemonstrated vasogenic edema left parietal vertex.  No hemorrhage identified.  No significant carotid or vertebral artery stenosis, no focal intracranial stenosis or aneurysm.  Positive labs:  Negative labs: UA, troponin, CBC (baseline leukocytosis, ACD), CMP, LDL, A1c, glucose on admission, magnesium.  Low concern for infectious or metabolic etiology, no acute traumatic injury noted on imaging.  Likely related to increasing burden of metastasis or recrudescence.  5/13/25 - MRI brain wo contrast. - stable enhancing lesions in left occipital and parietal lobe surrounding vasogenic edema. No new enhancing lesions   TSH - 0.408, Free T4 - normal    Plan:  Palliative care consult:  Continuing to follow.  Neurology consult:  MRI stable.  Continue home aspirin, defer to oncology for steroid regimen, f/u outpatient with neuro in 4/6 weeks.  Oncology consult:  Continue dexamethasone at 6 mg total dosing, however at 3 mg twice daily instead of 2 mg 3 times daily.  PT/OT evaluated - Level 1 - [facility].

## 2025-05-15 NOTE — DISCHARGE INSTR - AVS FIRST PAGE
Dear Ayla Nye,     It was our pleasure to care for you here at [facility].  It is our hope that we were always able to exceed the expected standards for your care during your stay.  You were hospitalized due to left-sided weakness and fatigue and fall.  You were cared for on the third floor by Greg Jarrett MD under the service of Georgie Bishop DO with the [facility] who covers for your primary care physician (PCP), Rafy Angelo MD, while you were hospitalized.  If you have any questions or concerns related to this hospitalization, you may contact us at .  For follow up as well as any medication refills, we recommend that you follow up with your primary care physician.  A registered nurse will reach out to you by phone within a few days after your discharge to answer any additional questions that you may have after going home.  However, at this time we provide for you here, the most important instructions / recommendations at discharge:     Notable Medication Adjustments -   Please continue taking your dexamethasone (Decadron) 3 mg by mouth twice a day, once in the morning and once in the evening.  Please start taking methocarbamol (Robaxin) 500 mg by mouth every 6 hours as needed for muscle spasms.  Please apply the lidocaine patch (Lidoderm) 5% as needed for up to 12 hours over a 24-hour period and diclofenac sodium (Voltaren gel) up to 4 times a day as needed for any neck pain.  Please continue taking your other normally prescribed medications, including your aspirin 81 mg daily.  Testing Required after Discharge -   None  ** Please contact your PCP to request testing orders for any of the testing recommended here **  Important follow up information -   Please follow-up with your PCP within 1 week.  Please follow-up with neurology within 4 to 6 weeks after discharge for your vasogenic edema.  Please follow-up with your  oncologist, Dr. Castro, within 4 to 6 weeks after discharge.  Other Instructions -   We hope you feel better soon. If your symptoms worsen, please call 911 immediately or go to the nearest Emergency Department.  Please review this entire after visit summary as additional general instructions including medication list, appointments, activity, diet, any pertinent wound care, and other additional recommendations from your care team that may be provided for you.      Sincerely,     Greg Jarrett MD

## 2025-05-15 NOTE — ASSESSMENT & PLAN NOTE
Continue home levothyroxine 50mcg daily.  TSH 0.408 and T4 0.79 on 5/12.  Recommend repeat TSH in 6 weeks as outpatient.

## 2025-05-15 NOTE — ASSESSMENT & PLAN NOTE
Continue melatonin 3mg qHS  Encourage sleep hygiene (routine that promotes healthy circadian rhythm,avoid caffeine later in the day, quiet/dark room at night, reduce electronic before bedtime)

## 2025-05-15 NOTE — ASSESSMENT & PLAN NOTE
2/2 to metastatic adenocarcinoma  Prior provider discussed with radiation oncology, recommend continue with Decadron taper, follow-up MRI outpatient, follow-up radiation oncology outpatient with Dr. Vuong.  During prior admission, no role for additional radiation 5/6/2025.  Plan:  See SLS

## 2025-05-15 NOTE — ASSESSMENT & PLAN NOTE
2/2 to metastatic adenocarcinoma  Prior provider discussed with radiation oncology, recommend continue with Decadron taper, follow-up MRI outpatient, follow-up radiation oncology outpatient with Dr. Vuong.  During prior admission, no role for additional radiation 5/6/2025.  Plan:  See SLS  F/u with neurology outpatient.

## 2025-05-15 NOTE — ASSESSMENT & PLAN NOTE
Presented on 5/12/25 after experiencing mechanical fall with head strike.  Hx of L sided weakness s/p stroke in 04/2025.  CTH showed vasogenic edema L parietal vertex compatible with known brain metastasis, no hemorrhage identified.  MRI brain showed stable enhancing lesions within the L superior occipital lobe and L parietal lobe with surrounding vasogenic edema, stable foci of restricted diffusion within the R posterior frontal lobe most likely sequela of subacute ischemia.    Per Neurology - symptoms likely due to some increase in size of CNS metastasis on top of noted vasogenic edema.  Home dexamethasone increased from 2mg BID to 3mg BID.    Neurovascular checks Q shift.  Follow-up with Neurology in 4-6 weeks as outpatient.    Primary team following.  PT/OT/SLP.

## 2025-05-15 NOTE — HOSPITAL COURSE
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  With head strike, however no laceration or LOC.  No longer on Xarelto due to recent MRI with small focus of hemorrhage, continued on aspirin.  During her stay, MRI noted stable vasogenic edema compared to prior with no concern for acute stroke and neurology signing off and recommending outpatient follow-up in 4 to 6 weeks.  Oncology recommending continuing dexamethasone now at 3 mg twice daily, with follow-up s/p rehab.  Patient qualified for acute rehab, and was approved.  Planned discharge to [place] acute rehab with patient hopeful for continued chemotherapy following rehab, VSS on discharge.

## 2025-05-15 NOTE — ASSESSMENT & PLAN NOTE
R frontal subacute infarct in 04/2025.  Continue ASA 81mg daily and Lipitor 40mg daily.  Continue with PT/OT.  Follow-up with Neurovascular in 4-6 weeks as outpatient.

## 2025-05-15 NOTE — ASSESSMENT & PLAN NOTE
Right frontal subacute infarct diagnosed on 04/2025  Residual left hemiparesis   Continue ASA 81mg daily and Lipitor 40mg daily.  Continue with PT/OT  Follow-up with Neurovascular in 4-6 weeks as outpatient.

## 2025-05-15 NOTE — ASSESSMENT & PLAN NOTE
CC: Fall at home on aspirin.  Recent admission on 5/5 and discharged on 5/7 for SLS and found to have infarct in R MCA territory.  Continue with aspirin and statin, Xarelto discontinued in April 2025 after new foci of hemorrhage within the left occipital metastasis.  Vasogenic brain edema, maintained on dexamethasone.  CT CAP with contrast:  Evidence of disease progression with several new and enlarging lesions, new lesions in right lower lobe, right thigh intramuscular mass (R thigh mass contrast enhancing, possible hematoma however not superficial on imaging).  Redemonstrated heterogeneous right renal mass A/W urothelial enhancement, concerning for metastatic lesion with probable involvement of the collecting system.  CTA head and neck:  Redemonstrated vasogenic edema left parietal vertex.  No hemorrhage identified.  No significant carotid or vertebral artery stenosis, no focal intracranial stenosis or aneurysm.  Positive labs:  Negative labs: UA, troponin, CBC (baseline leukocytosis, ACD), CMP, LDL, A1c, glucose on admission, magnesium.  Low concern for infectious or metabolic etiology, no acute traumatic injury noted on imaging.  Likely related to increasing burden of metastasis or recrudescence.  5/13/25 - MRI brain wo contrast. - stable enhancing lesions in left occipital and parietal lobe surrounding vasogenic edema. No new enhancing lesions   TSH - 0.408, Free T4 - normal    Plan:  Palliative care consult:  Continuing to follow.  Neurology consult:  MRI stable.  Continue home aspirin, defer to oncology for steroid regimen, f/u outpatient with neuro in 4/6 weeks.  Oncology consult:  Continue dexamethasone at 6 mg total dosing, however at 3 mg twice daily instead of 2 mg 3 times daily.  PT/OT evaluated - Level 1 -pending placement.

## 2025-05-15 NOTE — CASE MANAGEMENT
Case Management Discharge Planning Note    Patient name Ayla Nye  Location S /S -01 MRN [id number]  : 1950 Date 5/15/2025       Current Admission Date: 2025  Current Admission Diagnosis:Stroke-like symptoms   Patient Active Problem List    Diagnosis Date Noted    Vasogenic brain edema (HCC) 2025    Abnormal CT of the chest 2025    Stroke-like symptoms 2025    History of stroke 2025    NSVT (nonsustained ventricular tachycardia) (HCC) 2025    Left-sided weakness 2025    Right loin pain 2025    Complication of chemotherapy/immunotherapy 02/15/2025    GERD (gastroesophageal reflux disease) 2025    Epistaxis 2025    History of Pneumocystis jirovecii pneumonia 2025    IgG deficiency (HCC) 2025    Right kidney mass 2025    History of pulmonary embolism 2024    Dyspnea on exertion 2024    Imbalance 2024    Hyponatremia 2024    Leukocytosis 10/27/2024    Anemia 10/25/2024    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    Palliative care patient 2024    Cancer associated pain 2024    Goals of care, counseling/discussion 2024    Right hip pain 09/10/2024    Altered mental status 2024    Hypothyroidism 2024    Abnormal imaging of central nervous system 2024    Acute metabolic encephalopathy 2024    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) 2024    Brain mass 2024    Metastatic cancer to brain (HCC) 2024    Dehydration 2024    Moderate protein-calorie malnutrition (HCC) 2024    Bilateral leg pain 2024    Insomnia 2024    Metastatic adenocarcinoma (HCC) 2024    Age-related osteoporosis without current pathological fracture 2023    Encounter for monitoring of hydroxyurea therapy 2023    JAK2 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 [address]  Phone: [phone] Fax: [phone]    [facility] [place] - [address]  [address]  [place] [address]  Phone: [phone] Fax: [phone]    Primary Care Provider: Rafy Angelo MD    Primary Insurance: MEDICARE  Secondary Insurance: AARP    DISCHARGE DETAILS:    Discharge planning discussed with:: patient and pt's ex-  Freedom of Choice: Yes  Comments - Freedom of Choice: CM f/u w/ pt and pt's ex- Sandro at bedside re: STR options available. Patient relayed first choice for [facility] and second choice for [facility]. Per [facility] - pt medically stable for d/c today. CM f/u w/ [facility] via [facility]in re: bed availability for today - response received bed available for [facility]. CM f/u with pt and ex- at bedside re: same. Pt rel[facility] to [facility] for [facility]15 via [facility] to [facility] today. All parties notified of same. Dcp update provided to pt's dtr Juliana via phone.  CM contacted family/caregiver?: Yes  Were Treatment Team discharge recommendations reviewed with patient/caregiver?: Yes  Did patient/caregiver verbalize understanding of patient care needs?: N/A- going to facility  Were patient/caregiver advised of the risks associated with not following Treatment Team discharge recommendations?: Yes    Contacts  Patient Contacts: Sandro (ex-) at bedside and Juliana (dtr) via phone  Relationship to Patient::  Family  Contact Method: In Person, Phone  Phone Number: [phone]  Reason/Outcome: Discharge Planning, Referral, Emergency Contact, Continuity of Care    Requested Home Health Care         Is the patient interested in HHC at discharge?: No    DME Referral Provided  Referral made for DME?: No    Other Referral/Resources/Interventions Provided:  Interventions: Transportation  Referral Comments: Transport referral placed to [facility] for [facility]15 via [facility] today. All parties notified of same. Dcp update provided to pt's dtr Juliana via phone.         Treatment Team Recommendation: Acute Rehab  Discharge Destination Plan:: Acute Rehab  Transport at Discharge : Wheelchair van        Transported by (Company and Unit #): [facility]  ETA of Transport (Date): 05/15/25  ETA of Transport (Time): 1415                            Accepting Facility Name, City & State : [facility]  Receiving Facility/Agency Phone Number: [phone]  Facility/Agency Fax Number: [phone]

## 2025-05-15 NOTE — ASSESSMENT & PLAN NOTE
Patient was evaluated by the rehabilitation team MD and an appropriate candidate for acute inpatient rehabilitation program at this time.  The patient will tolerate 3 hours/day 5 to 7 days/week of intensive physical, speech and occupational therapy   in order to obtain goals for community discharge  Due to the patient's functional Compared to their baseline level of function in addition to their ongoing medical needs, the patient would benefit from daily supervision from a rehabilitation physician as well as rehabilitation nursing to implement and adjust the medical as well as functional plan of care in order to meet the patient's goals.  Bladder: Monitor closely for urinary incontinence and/or retention. Monitor urine output and encourage spontaneous voids. If unable to void spontaneously, will monitor with PVR bladder scans and initiate CIC regimen.  Skin: Monitor for breakdown, frequent turns, pressure offloading  Bowel: Monitor closely for constipation and/or incontinence. Will follow BMs and adjust bowel regimen to target soft, formed stools q1-2 days, per pt's regular schedule  Discharge date TBD, pending interdisciplinary meeting

## 2025-05-15 NOTE — ASSESSMENT & PLAN NOTE
Recently within 10-15 range   Likely steroid induced vs   Continue to monitor CBC and signs/symptoms of infection

## 2025-05-16 PROBLEM — Z91.89 AT RISK FOR VENOUS THROMBOEMBOLISM (VTE): Status: ACTIVE | Noted: 2025-05-16

## 2025-05-16 LAB
ALBUMIN SERPL BCG-MCNC: 3.8 G/DL (ref 3.5–5)
ALP SERPL-CCNC: 53 U/L (ref 34–104)
ALT SERPL W P-5'-P-CCNC: 13 U/L (ref 7–52)
ANION GAP SERPL CALCULATED.3IONS-SCNC: 10 MMOL/L (ref 4–13)
AST SERPL W P-5'-P-CCNC: 11 U/L (ref 13–39)
BILIRUB SERPL-MCNC: 0.23 MG/DL (ref 0.2–1)
BUN SERPL-MCNC: 25 MG/DL (ref 5–25)
CALCIUM SERPL-MCNC: 9 MG/DL (ref 8.4–10.2)
CHLORIDE SERPL-SCNC: 104 MMOL/L (ref 96–108)
CO2 SERPL-SCNC: 26 MMOL/L (ref 21–32)
CREAT SERPL-MCNC: 0.86 MG/DL (ref 0.6–1.3)
ERYTHROCYTE [DISTWIDTH] IN BLOOD BY AUTOMATED COUNT: 16.4 % (ref 11.6–15.1)
GFR SERPL CREATININE-BSD FRML MDRD: 66 ML/MIN/1.73SQ M
GLUCOSE P FAST SERPL-MCNC: 117 MG/DL (ref 65–99)
GLUCOSE SERPL-MCNC: 117 MG/DL (ref 65–140)
HCT VFR BLD AUTO: 34 % (ref 34.8–46.1)
HGB BLD-MCNC: 10.1 G/DL (ref 11.5–15.4)
MCH RBC QN AUTO: 27.5 PG (ref 26.8–34.3)
MCHC RBC AUTO-ENTMCNC: 29.7 G/DL (ref 31.4–37.4)
MCV RBC AUTO: 93 FL (ref 82–98)
NRBC BLD AUTO-RTO: 0 /100 WBCS
PLATELET # BLD AUTO: 333 THOUSANDS/UL (ref 149–390)
PMV BLD AUTO: 9.2 FL (ref 8.9–12.7)
POTASSIUM SERPL-SCNC: 4.2 MMOL/L (ref 3.5–5.3)
PROT SERPL-MCNC: 6.5 G/DL (ref 6.4–8.4)
RBC # BLD AUTO: 3.67 MILLION/UL (ref 3.81–5.12)
SODIUM SERPL-SCNC: 140 MMOL/L (ref 135–147)
WBC # BLD AUTO: 14.84 THOUSAND/UL (ref 4.31–10.16)

## 2025-05-16 PROCEDURE — 85027 COMPLETE CBC AUTOMATED: CPT

## 2025-05-16 PROCEDURE — 99233 SBSQ HOSP IP/OBS HIGH 50: CPT | Performed by: PHYSICAL MEDICINE & REHABILITATION

## 2025-05-16 PROCEDURE — 80053 COMPREHEN METABOLIC PANEL: CPT

## 2025-05-16 PROCEDURE — 97116 GAIT TRAINING THERAPY: CPT

## 2025-05-16 PROCEDURE — 97535 SELF CARE MNGMENT TRAINING: CPT

## 2025-05-16 PROCEDURE — 97162 PT EVAL MOD COMPLEX 30 MIN: CPT

## 2025-05-16 PROCEDURE — 97110 THERAPEUTIC EXERCISES: CPT

## 2025-05-16 PROCEDURE — 99232 SBSQ HOSP IP/OBS MODERATE 35: CPT | Performed by: INTERNAL MEDICINE

## 2025-05-16 PROCEDURE — 97167 OT EVAL HIGH COMPLEX 60 MIN: CPT

## 2025-05-16 PROCEDURE — 97530 THERAPEUTIC ACTIVITIES: CPT

## 2025-05-16 RX ORDER — ACETAMINOPHEN 325 MG/1
650 TABLET ORAL EVERY 6 HOURS SCHEDULED
Status: DISCONTINUED | OUTPATIENT
Start: 2025-05-16 | End: 2025-05-17

## 2025-05-16 RX ADMIN — DEXAMETHASONE 3 MG: 0.5 TABLET ORAL at 20:30

## 2025-05-16 RX ADMIN — GABAPENTIN 300 MG: 300 CAPSULE ORAL at 20:30

## 2025-05-16 RX ADMIN — ASPIRIN 81 MG CHEWABLE TABLET 81 MG: 81 TABLET CHEWABLE at 08:44

## 2025-05-16 RX ADMIN — ALUMINUM HYDROXIDE, MAGNESIUM HYDROXIDE, AND DIMETHICONE 30 ML: 200; 20; 200 SUSPENSION ORAL at 23:27

## 2025-05-16 RX ADMIN — ACETAMINOPHEN 650 MG: 325 TABLET, FILM COATED ORAL at 23:26

## 2025-05-16 RX ADMIN — CYANOCOBALAMIN TAB 1000 MCG 1000 MCG: 1000 TAB at 08:44

## 2025-05-16 RX ADMIN — LEVOTHYROXINE SODIUM 50 MCG: 0.05 TABLET ORAL at 05:35

## 2025-05-16 RX ADMIN — Medication 3 MG: at 20:30

## 2025-05-16 RX ADMIN — DICLOFENAC SODIUM 2 G: 10 GEL TOPICAL at 08:46

## 2025-05-16 RX ADMIN — LEVETIRACETAM 1000 MG: 500 TABLET, FILM COATED ORAL at 08:44

## 2025-05-16 RX ADMIN — METHOCARBAMOL TABLETS 250 MG: 500 TABLET, COATED ORAL at 23:27

## 2025-05-16 RX ADMIN — ACETAMINOPHEN 975 MG: 325 TABLET, FILM COATED ORAL at 12:00

## 2025-05-16 RX ADMIN — ATORVASTATIN CALCIUM 40 MG: 40 TABLET, FILM COATED ORAL at 17:36

## 2025-05-16 RX ADMIN — LEVETIRACETAM 1000 MG: 500 TABLET, FILM COATED ORAL at 20:30

## 2025-05-16 RX ADMIN — LIDOCAINE 5% 1 PATCH: 700 PATCH TOPICAL at 08:46

## 2025-05-16 RX ADMIN — SENNOSIDES 8.6 MG: 8.6 TABLET, FILM COATED ORAL at 08:44

## 2025-05-16 RX ADMIN — ACETAMINOPHEN 650 MG: 325 TABLET, FILM COATED ORAL at 17:36

## 2025-05-16 RX ADMIN — ACETAMINOPHEN 975 MG: 325 TABLET, FILM COATED ORAL at 06:20

## 2025-05-16 RX ADMIN — DICLOFENAC SODIUM 2 G: 10 GEL TOPICAL at 20:37

## 2025-05-16 RX ADMIN — DICLOFENAC SODIUM 2 G: 10 GEL TOPICAL at 17:36

## 2025-05-16 RX ADMIN — DICLOFENAC SODIUM 2 G: 10 GEL TOPICAL at 12:00

## 2025-05-16 RX ADMIN — PANTOPRAZOLE SODIUM 40 MG: 40 TABLET, DELAYED RELEASE ORAL at 06:20

## 2025-05-16 RX ADMIN — DEXAMETHASONE 3 MG: 0.5 TABLET ORAL at 08:46

## 2025-05-16 RX ADMIN — GABAPENTIN 200 MG: 100 CAPSULE ORAL at 08:44

## 2025-05-16 RX ADMIN — AMLODIPINE BESYLATE 5 MG: 5 TABLET ORAL at 08:44

## 2025-05-16 RX ADMIN — QUETIAPINE FUMARATE 12.5 MG: 25 TABLET ORAL at 20:30

## 2025-05-16 RX ADMIN — ENOXAPARIN SODIUM 40 MG: 40 INJECTION SUBCUTANEOUS at 14:34

## 2025-05-16 RX ADMIN — SULFAMETHOXAZOLE AND TRIMETHOPRIM 1 TABLET: 800; 160 TABLET ORAL at 08:44

## 2025-05-16 NOTE — PROGRESS NOTES
Progress Note - PMR   Name: Ayla Nye 74 y.o. 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 Treatments complicated by toxicities and infection.   Most recently receiving Taoxtere with plans to start chemotherapy after discharge   MRI brain showed stable enhancing lesions within the L superior occipital lobe and L parietal lobe with surrounding vasogenic edema, stable foci of restricted diffusion within the R posterior frontal lobe most likely sequela of subacute ischemia.  CT CAP showed multiple lesions with increase in size.    Plan:   Continue increased dose of dexamethasone 3mg BID.  Continue Bactrim -160mg MWF  Continue Keppra 1000mg BID  See pain insertion   Follows with Dr. Castro (Oncology), Dr Vuong (Radiation/Oncology), and Palliative as outpatient  Monitor for new or worsening pain, decreased ROM,  changes in strength, sensation, balance, and mentation,increased fatigue, SOB, edema, abdominal pain, nausea, vomiting, constipation, wt loss, worsening intake/appetite   HTN (hypertension)  Continue home amlodipine 5mg daily  Monitor BP trends   Insomnia  Continue melatonin 3mg qHS  Encourage sleep hygiene (routine that promotes healthy circadian rhythm,avoid caffeine later in the day, quiet/dark room at night, reduce electronic before bedtime)  Hypothyroidism  Continue home levothyroxine 50mcg daily  Cancer associated pain  Current pain regimen: Scheduled Tylenol, gabapentin daily and qHS,, Lidoderm, and PRN Robaxin   Continue to monitor pain levels and adjust as needed   Anemia  Hgb currently stable at 10.1.  Baseline 9-10.  Continue home cyanocobalamin 1000mcg daily.  Continue to monitor CBC  Leukocytosis  Recently within 10-15 range   Likely steroid induced vs    Continue to monitor CBC and signs/symptoms of infection   History of pulmonary embolism  Most recent CTA chest in March 2025 negative for PE   Previously on Xarelto but discontinued due to new foci of hemorrhage within the L occipital metastasis (April 2025)   Monitor respiratory status   History of Pneumocystis jirovecii pneumonia  Continue Bactrim 3x weekly   GERD (gastroesophageal reflux disease)  Continue PTA Protonix 40mg daily  History of stroke  Right frontal subacute infarct diagnosed on 04/2025  Residual left hemiparesis   Continue ASA 81mg daily and Lipitor 40mg daily.  Continue with PT/OT  Follow-up with Neurovascular in 4-6 weeks as outpatient.  Fall  Presented on 5/12/25 after experiencing mechanical fall with head strike.  Hx of L sided weakness s/p stroke in 04/2025.  CTH showed vasogenic edema L parietal vertex compatible with known brain metastasis, no hemorrhage identified.  MRI brain showed stable enhancing lesions within the L superior occipital lobe and L parietal lobe with surrounding vasogenic edema, stable foci of restricted diffusion within the R posterior frontal lobe most likely sequela of subacute ischemia.  Per Neurology, symptoms likely due to some increase in size of CNS metastasis on top of noted vasogenic edema.  Home dexamethasone increased from 2mg BID to 3mg BID  Impaired mobility and ADLs  Patient was evaluated by the rehabilitation team MD and an appropriate candidate for acute inpatient rehabilitation program at this time.  The patient will tolerate 3 hours/day 5 to 7 days/week of intensive physical, speech and occupational therapy   in order to obtain goals for community discharge  Due to the patient's functional Compared to their baseline level of function in addition to their ongoing medical needs, the patient would benefit from daily supervision from a rehabilitation physician as well as rehabilitation nursing to implement and adjust the medical as well as functional plan  of care in order to meet the patient's goals.  Bladder: Monitor closely for urinary incontinence and/or retention. Monitor urine output and encourage spontaneous voids. If unable to void spontaneously, will monitor with PVR bladder scans and initiate CIC regimen.  Skin: Monitor for breakdown, frequent turns, pressure offloading  Bowel: Monitor closely for constipation and/or incontinence. Will follow BMs and adjust bowel regimen to target soft, formed stools q1-2 days, per pt's regular schedule  Discharge date TBD, pending interdisciplinary meeting     At risk for venous thromboembolism (VTE)  Continue Lovenox while inpatient     Subjective   Patient is a 74 year-old female with a past medical history of stage IV adenocarcinoma of the lung metastatic to brain, history of ischemic stroke in April 2025, GERD, hyperlipidemia, hypertension, history of pulmonary embolism, presenting to [facility] due to a progressive cancer metastases, recent fall, recent stroke and debility. She was admitted from 5/5 to 5/7 due to left-sided hemiparesis as a result of right MCA territory frontal lobe stroke. Xarelto was discontinued in April 2025 after new foci of hemorrhage within the left occipital metastasis She presented to the ED again on 5/12 after a fall with headstrike. Imaging did not show an acute traumatic injury, though did show progression of metastases. Palliative care, neurology, and heme onc consulted. She was evaluated by PT and OT and deemed an appropiate candidate for [facility] due to functional deficits.        Chief Complaint: f/u ambulatory dysfunction and increased weakness    Interval:     Seen and evaluated patient in room. She denies any acute concerns, is having mild back pain (due for Tylenol soon). We discussed the importance of Lovenox injections (patient refused in acute care). Last BM 5/15, continent of bowel and bladder.   5/16 labs reviewed, WBC stable at 14.84 (from 14.75) ang hgb increased at 10.1     Objective  :  Temp:  [97.9 °F (36.6 °C)-98.8 °F (37.1 °C)] 97.9 °F (36.6 °C)  HR:  [73-89] 89  BP: (127-168)/(80-93) 127/81  Resp:  [18] 18  SpO2:  [93 %-94 %] 94 %  O2 Device: None (Room air)    Functional Update:  Pending     Physical Exam  Vitals and nursing note reviewed.   Constitutional:       General: She is not in acute distress.     Appearance: She is not toxic-appearing or diaphoretic.      Comments: Appears comfortable resting in bed    HENT:      Head: Normocephalic and atraumatic.      Mouth/Throat:      Mouth: Mucous membranes are moist.   Pulmonary:      Effort: Pulmonary effort is normal. No respiratory distress.     Neurological:      Mental Status: She is alert.      Comments: Alert and appropriate to questions    Psychiatric:         Mood and Affect: Mood normal.         Behavior: Behavior normal.           Scheduled Meds:  Current Facility-Administered Medications   Medication Dose Route Frequency Provider Last Rate    acetaminophen  975 mg Oral Q6H [facility] Claudine Leos PA-C      aluminum-magnesium hydroxide-simethicone  30 mL Oral Q4H PRN Claudine Leos PA-C      amLODIPine  5 mg Oral Daily Claudine Leos PA-C      aspirin  81 mg Oral Daily Claudine Leos PA-C      atorvastatin  40 mg Oral Daily With Dinner Claudine Leos PA-C      vitamin B-12  1,000 mcg Oral Daily Claudine Leos PA-C      dexamethasone  3 mg Oral BID Claudine Leos PA-C      Diclofenac Sodium  2 g Topical 4x Daily Claudine Leos PA-C      enoxaparin  40 mg Subcutaneous Q24H Claudine Leos PA-C      gabapentin  200 mg Oral Daily Claudine Leos PA-C      And    gabapentin  300 mg Oral HS Claudine Leos PA-C      levETIRAcetam  1,000 mg Oral BID Claudine Leos PA-C      levothyroxine  50 mcg Oral Early Morning Claudine Leos PA-C      lidocaine  1 patch Topical Daily Claudine Leos PA-C      melatonin  3 mg Oral HS Claudine Leos PA-C      methocarbamol  250 mg Oral Q6H PRN Claudine Leos PA-C      pantoprazole  40 mg Oral  Daily Before Breakfast Claudine Leos PA-C      QUEtiapine  12.5 mg Oral HS Claudine Leos PA-C      senna  1 tablet Oral Daily Claudine Leos PA-C      sulfamethoxazole-trimethoprim  1 tablet Oral Once per day on Monday Wednesday Friday Claudine Leos PA-C           Lab Results: I have reviewed the following results:  Results from last 7 days   Lab Units 05/16/25  0537 05/15/25  0510 05/14/25  0455   HEMOGLOBIN g/dL 10.1* 9.4* 9.3*   HEMATOCRIT % 34.0* 31.2* 30.4*   WBC Thousand/uL 14.84* 14.75* 12.92*   PLATELETS Thousands/uL 333 318 323     Results from last 7 days   Lab Units 05/16/25  0537 05/13/25  0436 05/12/25  1423   BUN mg/dL 25 24 23   SODIUM mmol/L 140 139 141   POTASSIUM mmol/L 4.2 4.5 4.3   CHLORIDE mmol/L 104 106 104   CREATININE mg/dL 0.86 0.88 1.00   AST U/L 11*  --  9*   ALT U/L 13  --  10            Claudine Leos PA-C  Physical Medicine and Rehabilitation  [facility]

## 2025-05-16 NOTE — PROGRESS NOTES
05/16/25 0700   Rehab Team Goals   ADL Team Goal Patient will require supervision with ADLs with least restrictive device upon completion of rehab program   Rehab Team Interventions   OT Interventions Self Care;Home Management;Therapeutic Exercise;Cognitive Retraining;Patient/Family Education   Eating Goal   Eating Goal 06. Independent - Patient completes the activity by him/herself with no assistance from a helper.   Assist Device   (TBD)   Status Ongoing;Target goal - three weeks;Target goal - two weeks   Grooming Goal   Oral Hygiene Goal 04. Supervision or touching assistance- [place] provides VERBAL CUES or supervision throughout activity.   Task Complete Groom   Environment Stand at Sink   Safety Precautions Safety Precaution   Status Ongoing;Target goal - three weeks;Target goal - two weeks   Intervention Assistive Device;Balance Work;Tolerance Work;Neuromuscular Education   Tub/Shower Transfer Goal   Method Tub Shower  (mod A)   Assist Device Seat with Back;Grab Bar   Status Ongoing;Target goal - two weeks;Target goal - three weeks   Bathing Goal   Shower/bathe self Goal 04. Supervision or touching assistance- [place] provides VERBAL CUES or supervision throughout activity.   Environment Shower   Adaptive Equipment Seat with back;Grab Bar   Safety Precautions Safety Precaution   Status Ongoing;Target goal - two weeks;Target goal - three weeks   Intervention ADL Training;Assistive Device;Neuromuscular Education;Therapeutic Exercise   Upper Body Dressing Goal   Upper body dressing Goal 04. Supervision or touching assistance- [place] provides VERBAL CUES or supervision throughout activity.   Task Upper Body   Environment Seated   Safety Precautions Safety Precaution   Status Ongoing;Target goal - two weeks;Target goal - three weeks   Intervention Balance Work;Neuromuscular Education;Therapeutic Exercise;Tolerance Work   Lower Body Dressing Goal   Lower body dressing Goal 04. Supervision or touching assistance-  [place] provides VERBAL CUES or supervision throughout activity.   Putting on/taking off footwear Goal 04. Supervision or touching assistance- [place] provides VERBAL CUES or supervision throughout activity.   Task Lower Body   Adaptive Equipment   (TBD)   Environment Seated;Standing   Safety Precautions Safety Precaution   Status Ongoing;Target goal - two weeks;Target goal - three weeks   Intervention Assistive Device;Balance Work;Neuromuscular Education;Therapeutic Exercise;Tolerance Work   Toileting Transfer Goal   Toilet transfer Goal 04. Supervision or touching assistance- [place] provides VERBAL CUES or supervision throughout activity.   Assistive Device Raised Toilet Seat  (LRAD)   Safety Safety Precaution   Status Ongoing;Target goal - two weeks;Target goal - three weeks   Intervention ADL Training;Balance Work;Assistive Device   Toileting Goal   Toileting hygiene Goal 04. Supervision or touching assistance- [place] provides VERBAL CUES or supervision throughout activity.   Task Pants Up;Pants Down;Hygiene   Safety Balance;Use a Bedside Commode at Night   Status Ongoing;Target goal - two weeks;Target goal - three weeks   Intervention ADL Training;Balance Work;Assistive Device   Meal Prep and Kitchen Mobility   Assist Level Supervision  (light meal prep, microwave reheat)   Status Ongoing;Target goal - two weeks;Target goal - three weeks   Laundry   Assist Level Moderate Assist   Status Ongoing;Target goal - two weeks;Target goal - three weeks

## 2025-05-16 NOTE — TREATMENT PLAN
Individualized Plan of Care - [facility] [place]  Ayla Nye 74 y.o. female MRN: [id number]  Unit/Bed#: [facility] 262-01 Encounter: [id number]     PATIENT INFORMATION  ADMISSION DATE: 5/15/2025  3:23 PM TONYA CATEGORY:Brain Dysfunction:  02.1  Non-Traumatic  Etiologic: Stage IV adenocarcinoma of lung w/ mets to brain    ADMISSION DIAGNOSIS: Metastasis to brain (HCC) [C79.31]  Lung cancer (HCC) [C34.90]  EXPECTED LOS: 10 to 14 days     MEDICAL/FUNCTIONAL PROGNOSIS  Based on my assessment of the patient's medical conditions and current functional status, the prognosis for attaining medical and functional goals or the IRF stay is:  Good    Medical Goals: Patient will be medically stable for discharge to [facility] upon completion of rehab program and Patient will be able to manage medical conditions and comorbid conditions with medications and follow up upon completion of rehab program      Cardiopulmonary function/Anemia: Ensure cardiopulmonary stability and optimize cardiopulmonary function not only at rest but with activity as patient's activity level significantly increases in acute rehab compared with prior to transfer in preparation for safe discharge from [facility].  Must closely and frequently monitor blood pressure, HR, anemia to ensure adequate cardiac output during ADLs and ambulation as patient is at increased risk for orthostatic hypotension/syncope and potential injury if not monitored for and managed adequately.    Blood pressure management:    Frequent monitoring of blood pressure with appropriate adjustments in blood pressure medication management to optimize blood pressure control and prevent/limit renal complications.   Monitoring impact of blood pressure and side-effects of blood pressure medications at rest and with activity.  Hypoxia prevention: Ensure appropriate level of oxygenation at rest and with activity to avoid symptomatic hypoxia, maximize functional performance,  and decrease risk of atelectasis/pneumonia through close and frequent monitoring, providing appropriate respiratory treatments (such as incentive spirometry), and when necessary provide/adjust respiratory medications.    Pain management:  Pain will improve with frequent evaluation of pain, careful adjustments in medications, frequent re-evaluation of patient's pain and medical/neurologic status to ensure optimal pain control, avoidance of potential serious and even life-threatening side-effects and drug interactions, as well as weaning pain medications as soon as possible to decrease risk of short and long-term use.     Neurologic Disorder: Brain mets causing impaired mobility, ADLs, and gait:  intensive skilled therapies with physical therapy and occupational therapy with close oversight and management by rehab specialized physician in acute rehabilitation setting to most expeditiously and effectively improve functional mobility, transfers, upper and lower body strengthening, conditioning, balance, and gait training with appropriate assistive device.  Patient will have optimal supervision and management of patient's underlying neurologic disorder with specialized rehabilitation physician during this period of recovery to ensure most expeditious and optimal recovery with decreased risks of fall/injury and other complications including acute worsening of neuro disorder, decrease risk of VTE, PNA, and skin ulceration.  Stroke with hemiparesis:  Intensive skilled therapies with physical therapy and occupational therapy with close oversight and management by rehab specialized physician in acute rehabilitation setting to most expeditiously and effectively improve functional mobility, transfers, upper and lower body strengthening, conditioning, balance, and gait training with appropriate assistive device.  Patient will have optimal blood pressure management and blood sugar control.  Prevent/decrease risk of shoulder  subluxation, chronic shoulder pain, plantarflexion contracture, VTE, atelectasis, pneumonia, skin ulceration and other injury.    Inpatient rehabilitation education/teaching:  To be provided to patient and typically family/caregiver (if able to be identified) by all skilled therapists, rehab nursing, case management, and rehab specialized physician to ensure optimal recovery and decrease risks of complications in both acute rehabilitation setting as well as after discharge.   Skin management: Increased risk of skin wounds/breakdown/rashes due to recent immobility and co-morbidities.   Appropriate skin checks from nursing and as needed physician.  Frequent turning, application of appropriate barrier creams/dressings, cushions.  Patient/caregiver education.  Optimal bowel/bladder hygiene and mobilization.        ANTICIPATED DISCHARGE DISPOSITION AND SERVICES  COMMUNITY SETTING: Home - Assistance      ANTICIPATED FOLLOW-UP SERVICE:   Outpatient Therapy Services: PT, OT, and SLP       DISCIPLINE SPECIFIC PLANS:  Required Disciplines & Services: Rehabillitation Nursing, Case Management, and Dietay/Nutrition    REQUIRED THERAPY:  Therapy Hours per Day Days per Week   Physical Therapy 1 5-6   Occupational Therapy 1 5-6   Speech/Language Therapy 1 3-5   NOTE: Additional therapy time(s) or changes to allocation of therapies as appropriate to meet patient needs and to achieve functional goals.      Patient will participate in above therapy regimen consisting of PT, OT, and SLP due to the following medical procedure/condition:Brain Dysfunction:  02.1  Non-Traumatic    ANTICIPATED FUNCTIONAL OUTCOMES:  ADL:  Mod I to min A with least restrictive assistive device    Bladder/Bowel: Patient will return to premorbid level for bladder/bowel management upon completion of rehab program   Transfers:   Mod I with least restrictive assistive device   Locomotion:   Mod I with least restrictive assistive device   Cognitive:  Baseline  function      DISCHARGE PLANNING NEEDS  Equipment needs: Discharge needs to be reviewed with team    REHAB ANTICIPATED PARTICIPATION RESTRICTIONS:  None

## 2025-05-16 NOTE — PCC NURSING
Ayla Nye is a 74 y.o. female with a PMH of lung adenocarcinoma with brain metastasis, hx of PE, prior stroke, HTN, hypothyroidism, and HLD who originally presented to [facility] on 5/12/25 due to a mechanical fall from ongoing left sided weakness, leading to head strike.  Patient had recently had a stroke with L sided weakness about 2 weeks prior and had been placed on steroids to assist with vasogenic edema.  CTH showed vasogenic edema L parietal vertex compatible with known brain metastasis, no hemorrhage identified.  Neurology was consulted and recommended obtaining MRI brain.  MRI brain showed stable enhancing lesions within the L superior occipital lobe and L parietal lobe with surrounding vasogenic edema and stable foci of restricted diffusion within the R posterior frontal lobe, most likely sequela of subacute ischemia.  Neurology felt that symptoms were likely due to some increase in size of CNS metastasis on top of noted vasogenic edema.  Oncology was consulted and recommended increasing dexamethasone from 2mg to 3mg BID.  Recommending to hold chemotherapy until outpatient follow-up.  Will require follow-up with Neurovascular in 4-6 weeks.  Evaluated by PT/OT and recommended for acute inpatient rehabilitation.      Admitted to [facility] on 5/15/2025.     Bowel maintained with Senna 17.2 mg daily. Pain managed with Tylenol 975 mg daily, and 650 mg daily, Voltaren gel 2 grams 4 times daily to affected area, Neurontin capsule 200 mg daily, and 400 mg daily at bedtime, Lidoderm 5% patch daily, Robaxin 250 mg every 6 hours PRN. DVT prophylaxis  ASA 81 mg daily, Lovenox SQ inj 40 mg every 24 hours. Insomnia managed with Remeron 7.5 mg tablet daily at bedtime, and [id number] mg daily @ bedtime and 12.5 mg daily PRN. Hypertension managed with Amlodipine 10mg daily. Hypothyroidism managed with levothyroxine 50mcg daily, recommend to repeat TSH in 6 weeks as outpatient. Anemia is  managed with Cyanocobalamin 1000 mcg daily will continue to trend routine CBC. Leukocytosis-no active s/s of infection, week of 5/27 WBC lab results are likely steroid induced, continue to trend routine CBC. GERD is managed with Protonix 40 mg daily before breakfast. Stroke like symptoms will be managed with neurovascular checks Q shift, Follow-up with Neurology in 4-6 weeks as outpatient, continue with Dexamethasone 2mg 2 times daily. Stage IV adenocarcinoma of lung metastatic to brain managed with dexamethasone 2mg BID, Bactrim daily on MWF, Keppra 1000mg BID.     This week we will continue to monitor vital signs and lab values. We will manage pain with the above orders so the patient can participate in her therapy sessions to her optimal abilities. We will teach the patient how to conserve energy so that she may perform ADL's independently as much as she can. We will educate the patient on the importance of repositioning and off-loading pressure to help lower the risk of skin breakdown. We will prevent falls by keeping personal items and call bell within reach, we will also initiate and maintain hourly rounding

## 2025-05-16 NOTE — ASSESSMENT & PLAN NOTE
Hgb currently stable at 10.1.  Baseline 9-10.  Continue home cyanocobalamin 1000mcg daily.  Asymptomatic.  Continue to trend routine CBC.

## 2025-05-16 NOTE — CASE MANAGEMENT
CM met with patient and sister in law, explained rehab routine and CM role with introduction. Patient reports she lives alone in 2SH with 6STE with railings and full flight of stairs to second floor. Bedrooms and full bath with tub shower on the second floor. Patient  independent PTA. Patient has a cane, walker ,elevated toilet seat with rails and shower chair. Patient has no prior outpt therapy, STR experience, is currently open to [facility] for RN/PT/OT. Patient has prescription coverage [facility] [address] [place] [address] and [facility] in [place]. CM confirmed patient's address, emergency contact and insurance information. CM explained team meeting process and medicare guidelines for length of stay. Patient has 2 supportive daughters who live an hour away, supportive ex , and sister in law who can offer assistance upon discharge.Patient also has a home health aide 7 hours/day , her name is Kavya, and is paid privately. IMM explained, signed and paperwork sent to be scanned into patient's chart.CM will follow for discharge needs.

## 2025-05-16 NOTE — PROGRESS NOTES
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 Yes   Multiple Support Systems Yes   Support System 2   Name 2 HHA Kavya   Relationship 2 5hrs/6 days/wk 2p-7p normal hrs, but after the fall 7a-9a and come back 4p-7p for 3 days until admitted to the hospital   Able to provide 24 hour supervision 2 No   Able to provide physical help? (2) Yes   Support System 3   Name 3 Sandro   Relationship 3 ex-   Able to provide 24 hour supervision 3 No   Home Setup   Type of Home Multi Level   Method of Entry Stairs;Hand Rail Left   Number of Stairs 6   Number of Stairs in Home 13   In Home Hand Rail Right   First Floor Bathroom None  (commode- Kavya empties)   First Floor Bathroom Accessibility Shower chair;Raised toilet seat  (with handles)   Second Floor Bathroom Tub;Shower;Curtain;Grab Bars   First Floor Setup Available Yes  (if necessary Pt could sleep on sofa)   Home Modifications Necessary?   (TBD)   Available Equipment Bedside Commode;Roller Walker;Standard Walker;Single Point Cane;Shower Chair   Baseline Information   Vocation Other  (retired )   Transportation Family/friends drive   Prior IADL Participation   Money Management   (independent finances)   Meal Preparation Full Participation   Laundry Full Participation  (laundry room is first floor, HHA transports up/down stairs)   Home Cleaning Partial Participation  (Dtr assists)   Prior Level of Function   Self-Care 3. Independent - Patient completed the activities by him/herself, with or without an assistive device, with no assistance from a  helper.  (past two weeks HHA started assisting more)   Indoor-Mobility (Ambulation) 3. Independent - Patient completed the activities by him/herself, with or without an assistive device, with no assistance from a helper.   Stairs 2. Needed Some Help - Patient needed a partial assistance from another person to complete activities.   Functional Cognition 3. Independent - Patient completed the activities by him/herself, with or without an assistive device, with no assistance from a helper.   Prior Assistance Needed for Driving;Household Chores/Cleaning;Medication Management;Shopping  ([place] sets up weekly pill box)   Prior Device Used D. Walker   Falls in the Last Year   Number of falls in the past 12 months 2  (hospital documentation indicates dtr reported more than 2)   Type of Injury Associated with Fall No injury   Patient Preference   Marita (Patient Preference) Gloria   Psychosocial   Psychosocial (WDL) WDL   Patient Behaviors/Mood Calm;Cooperative;Pleasant   Ability to Express Feelings Able to express   Ability to Express Needs Able to express   Ability to Express Thoughts Able to express   Ability to Understand Others Understands   Restrictions/Precautions   Precautions Fall Risk;Bed/chair alarms;Cognitive;Supervision on toilet/commode;Visual deficit   Weight Bearing Restrictions No   ROM Restrictions No   Pain Assessment   Pain Assessment Tool 0-10   Pain Score No Pain  (usually low back pain from a tumor at the pelvis takes tylenol q6hrs)   Patient's Stated Pain Goal No pain   Eating Assessment   Type of Assistance Needed Set-up / clean-up   Physical Assistance Level No physical assistance   Comment needs foods cut, butter and sauces applied, containers opened   Eating CARE Score 5   Oral Hygiene   Type of Assistance Needed Physical assistance   Physical Assistance Level 25% or less   Comment Min A to balance standing at the sink, assist to prepare the toothbrush   Oral Hygiene CARE Score 3   Grooming    Findings Min A standing hair care   Tub/Shower Transfer   Limitations Noted In Balance;Coordination;Safety;LE Strength;UE Strength   Findings Mod A to safely side step to her left to enter WIS and sit on shower seat. Pt with poor awareness of not being centered on the seat initially but able to adjust with cue   Shower/Bathe Self   Type of Assistance Needed Physical assistance   Physical Assistance Level 26%-50%   Comment assistance for right side due to left hand limitations, min/mod A in stance for perineal and buttocks, no LOB but inattention to the left hand on the GB   Shower/Bathe Self CARE Score 3   Bathing   Assessed Bath Style Shower   Anticipated D/C Bath Style Shower   Able to Gather/Transport No   Able to Adjust Water Temperature No   Positioning Seated;Standing   Dressing/Undressing Clothing   Remove UB Clothes Other  (gown)   Don UB Clothes Pullover Shirt;Bra   Type of Assistance Needed Physical assistance   Physical Assistance Level 76% or more   Comment Max A for OH bra, mod A for OH shirt. Pt reports having difficulty with bra fastener since CVA so her family got her OH bras- OT not convinced this is an easier strategy.   Upper Body Dressing CARE Score 2   Remove LB Clothes Undergarment;Socks   Don LB Clothes Pants;Undergarment;Socks;Shoes   Type of Assistance Needed Physical assistance   Physical Assistance Level 51%-75%   Comment mod/max A   Lower Body Dressing CARE Score 2   Putting On/Taking Off Footwear   Type of Assistance Needed Physical assistance   Physical Assistance Level 26%-50%   Comment able to doff slipper socks with supervision, don standard socks with moderate assist, slide on her shoes with CGA to lean fwd for finger in the back to fix heel.   Putting On/Taking Off Footwear CARE Score 3   Toileting Hygiene   Type of Assistance Needed Physical assistance   Physical Assistance Level 51%-75%   Comment seated hygiene, mod A CM   Toileting Hygiene CARE Score 2   Toilet Transfer   Type  of Assistance Needed Physical assistance   Physical Assistance Level 51%-75%   Comment ambulation with HHA right hand and gait belt to [facility] over toilet (Pt has riser with ahndles at home)   Toilet Transfer CARE Score 2   Sit to Lying   Type of Assistance Needed Incidental touching   Physical Assistance Level No physical assistance   Comment CGA flat bed no rail   Sit to Lying CARE Score 4   Lying to Sitting on Side of Bed   Type of Assistance Needed Incidental touching   Physical Assistance Level No physical assistance   Comment CGA flat bed no rail   Lying to Sitting on Side of Bed CARE Score 4   Sit to Stand   Type of Assistance Needed Physical assistance   Physical Assistance Level 26%-50%   Comment for balance and left attention   Sit to Stand CARE Score 3   Comprehension   QI: Comprehension 3. Usually Understands: Understands most conversations, but misses some part/intent of message. Requires cues at times to understand.   Expression   QI: Expression 3. Exhibits some difficulty with expressing needs and ideas (e.g., some words or finishing thoughts) or speech is not clear   RUE Assessment   RUE Assessment WFL   LUE Assessment   LUE Assessment X   Strength - LUE   L Gross Arm Strength 3/5  (with concentration on the movement, during functional tasks Pt demonstrates 3-/5.)   Coordination   Movements are Fluid and Coordinated 0   Coordination and Movement Description hypotonic LUE, dysmetria and inattention   Cognition   Overall Cognitive Status WFL   Arousal/Participation Alert;Responsive;Arousable;Cooperative   Attention Attends with cues to redirect   Orientation Level Oriented X4   Memory Decreased recall of precautions;Decreased recall of recent events;Decreased short term memory   Following Commands Follows one step commands with increased time or repetition   Comments reduced safety awareness, attempting to stand unassisted   Vision   Vision Comments chronic right eye peripheral limitation, otherwise  "appears to be visual inattention during functional tasks   Discharge Information   Patient's Discharge Plan Pt's plan is to return home alone with HHA. Not clear if this is realistic base don her current diagnoses.   Patient's Rehab Expectations \"get the arm and leg stronger so I can be independent at home\"   Barriers to Discharge Home Limited Family Support;Unsafe Home Setup;Decreased Cognitive Function;Decreased Strength;Decreased Endurance;Safety Considerations   Impressions see below   OT Therapy Minutes   OT Time In 0700   OT Time Out 0830   OT Total Time (minutes) 90   OT Mode of treatment - Individual (minutes) 90   OT Mode of treatment - Concurrent (minutes) 0   OT Mode of treatment - Group (minutes) 0   OT Mode of treatment - Co-treat (minutes) 0   OT Mode of Treatment - Total time(minutes) 90 minutes   OT Cumulative Minutes 90   Cumulative Minutes   Cumulative therapy minutes 90   Pt is a 73yo female diagnosed with lung cancer in 07/2024, Hx of radiation in 08/2024 and 01/2025. PMH of lung adenocarcinoma with brain metastasis, hx of PE, prior stroke, HTN, hypothyroidism, and HLD. Pt originally presented to [facility] on 5/12/25 due to a mechanical fall from ongoing left sided weakness, leading to head strike.  Patient had recently had a stroke with L sided weakness about 2 weeks prior and had been placed on steroids to assist with vasogenic edema. Pt's cancer treatments were placed on hold due to multiple admission/toxicities/PCP pneumonia/PE, with plans to start chemotherapy again at oncology f/u after discharge from [facility]. MRI brain showed stable enhancing lesions within the L superior occipital lobe and L parietal lobe with surrounding vasogenic edema, stable foci of restricted diffusion within the R posterior frontal lobe most likely sequela of subacute ischemia. CT CAP showed multiple lesions with increase in size. Pt lives at home alone, but does have a HHA for five hours/day for 6 days a " week.  States that she can increase time for the home health aid but she wants to focus getting on stronger first.  Home health aid helps with managing her medications for her.  Has 2 daughters in the area that also come to help her at times. PLOF was ambulation with nothing in the house, but admits to furniture walking; using no AD to go up the steps but comes down on her bottom due to fear of falling recently. Prior to CVA Pt was independent with selfcare, however has needed increased assistance on left side since. Pt presents with impairments in visual and physical inattention to left side, left hemiparesis, balance, endurance, safety awareness, problem solving and memory which are significantly limiting her ability to safely and independently perform her daily functional routines. In addition her home set-up with no bathroom or bed on the first floor is a safety concern for discharge. Chemotherapy is for palliative measures however the patient and family have not been ready to discuss hospice services at this time. Pt would benefit from skilled OT interventions to maximize functional return in LUE, improve balance, train in compensatory and adaptive strategies, assist with discharge planning. An early family meeting to discuss long term plans would be beneficial to help guide treatment and goals. At this time goals are being set for 24hr supervision in 2-3wks. OT spoke with physician about Pt's difficulty tolerating full 90min sessions today with need to sleep between. He is in agreement to order 900min/7days for Pt given her current diagnosis and limited endurance.

## 2025-05-16 NOTE — ASSESSMENT & PLAN NOTE
Current pain regimen: Scheduled Tylenol, gabapentin daily and qHS,, Lidoderm, and PRN Robaxin   Continue to monitor pain levels and adjust as needed

## 2025-05-16 NOTE — PCC OCCUPATIONAL THERAPY
Pt is a 75yo female diagnosed with lung cancer in 07/2024, Hx of radiation in 08/2024 and 01/2025. PMH of lung adenocarcinoma with brain metastasis, hx of PE, prior stroke, HTN, hypothyroidism, and HLD. Pt originally presented to [facility] on 5/12/25 due to a mechanical fall from ongoing left sided weakness, leading to head strike.  Patient had recently had a stroke with L sided weakness about 2 weeks prior and had been placed on steroids to assist with vasogenic edema. Pt's cancer treatments were placed on hold due to multiple admission/toxicities/PCP pneumonia/PE, with plans to start chemotherapy again at oncology f/u after discharge from [facility]. MRI brain showed stable enhancing lesions within the L superior occipital lobe and L parietal lobe with surrounding vasogenic edema, stable foci of restricted diffusion within the R posterior frontal lobe most likely sequela of subacute ischemia. CT CAP showed multiple lesions with increase in size. Pt lives at home alone, but does have a HHA for five hours/day for 6 days a week.  States that she can increase time for the home health aid but she wants to focus getting on stronger first.  Home health aid helps with managing her medications for her.  Has 2 daughters in the area that also come to help her at times. PLOF was ambulation with nothing in the house, but admits to furniture walking; using no AD to go up the steps but comes down on her bottom due to fear of falling recently. Prior to CVA Pt was independent with selfcare, however has needed increased assistance on left side since. Pt presents with impairments in visual and physical inattention to left side, left hemiparesis, balance, endurance, safety awareness, problem solving and memory which are significantly limiting her ability to safely and independently perform her daily functional routines. In addition her home set-up with no bathroom or bed on the first floor is a safety concern for discharge.  Chemotherapy is for palliative measures however the patient and family have not been ready to discuss hospice services at this time. Pt would benefit from skilled OT interventions to maximize functional return in LUE, improve balance, train in compensatory and adaptive strategies, assist with discharge planning. An early family meeting to discuss long term plans would be beneficial to help guide treatment and goals. At this time goals are being set for 24hr supervision in 2-3wks. OT spoke with physician about Pt's difficulty tolerating full 90min sessions today with need to sleep between. He is in agreement to order 900min/7days for Pt given her current diagnosis and limited endurance.    5/27/25:   ADL Status:   Grooming: supervision seated, Min A with VC's in stance Bathing: mod   UB Dressing: mod  LB Dressing: mod  Toileting:   min Toilet Transfers: min  Goals: supervision  Meeting Goals:  making gradual gains; pt noted since 5/25-5/26 to have increased fatigue and increased need for assistance with all functional transfers/functional tasks with need for Ax2 for toileting and functional transfers.   Modify Goals:   not at this time- pt to be d/c to STR; potential medication adjustments to be made per stand-up notes on 05/27/2025;    Barriers Interventions   Left hemiparesis UE>LE NMR, NPP, TE, weight bearing tasks   Inattention to left hemibody and visually Visual training and attention tasks   balance Static and dynamic standing tasks   Insight/acceptance of CLOF Discussion of home environment and safety risks                 Family Training: family have observed therapy sessions but not participated hands-on; family discussed STR;   Anticipated D/C Date: not yet determined- per records STR awaiting placement, estimated 2-3wk TITO  D/C Plan/Location:  STR  D/C Therapy Recommendations: 24 hour Assist  DME: [facility]

## 2025-05-16 NOTE — PROGRESS NOTES
"   05/16/25 1935   Pain Assessment   Pain Assessment Tool 0-10   Pain Score No Pain   Subjective   Subjective \"I was just talking about how you were going to come back\"   Roll Left and Right   Type of Assistance Needed Physical assistance;Adaptive equipment  (bed rail)   Physical Assistance Level 26%-50%   Comment modA to R side for completing turn with use of bed rail, requires cues to initiate turn by reaching with LUE   Roll Left and Right CARE Score 3   Sit to Lying   Type of Assistance Needed Incidental touching   Comment CGA for safety due to decreased balance.   Sit to Lying CARE Score 4   Lying to Sitting on Side of Bed   Type of Assistance Needed Incidental touching   Comment CGA for safety due to decreased balance. Improve use of LUE with cueing for attention to L side   Lying to Sitting on Side of Bed CARE Score 4   Sit to Stand   Type of Assistance Needed Incidental touching;Physical assistance   Physical Assistance Level 25% or less   Comment completed multiple times throughout session. Initially CGA with gait belt but progressed to Myla as fatigued with eccentric control for descent. Requires cues for hand placement and to complete turn to feel the chair on the back of her legs.   Sit to Stand CARE Score 3   Walk 10 Feet   Type of Assistance Needed Physical assistance  (HHA)   Physical Assistance Level 26%-50%   Comment Myla x 1 with HHA on R side that progresses to modA with L LOB for recovery. Patient requires cues for attention to L foot to improve toe drag gait pattern.   Walk 10 Feet CARE Score 3   Walk 50 Feet with Two Turns   Comment fatigued   Reason if not Attempted Safety concerns   Walk 50 Feet with Two Turns CARE Score 88   Walk 150 Feet   Comment fatigued   Reason if not Attempted Safety concerns   Walk 150 Feet CARE Score 88   Walking 10 Feet on Uneven Surfaces   Type of Assistance Needed Physical assistance  (HHA)   Physical Assistance Level 26%-50%   Comment HHA R hand and gait belt " "with modA for balance due to decreased LLE weight acceptance and toe drag   Walking 10 Feet on Uneven Surfaces CARE Score 3   Ambulation   Distance Walked (feet) 30 ft  (x 6)   Assist Device Hand Hold   Gait Pattern Inconsistant Aparna;Slow Aparna;L foot drag;Narrow BILL;Step through;Improper weight shift   Walk Assist Level Moderate Assist   Does the patient walk? 2. Yes   Wheelchair mobility   Does the patient use a wheelchair? 0. No   Picking Up Object   Type of Assistance Needed Physical assistance   Physical Assistance Level 26%-50%   Comment modA x 1 for picking up a marker with the RUE. Requires assistance for balance. When attempted with reacher, patient with only need for Myla   Picking Up Object CARE Score 3   Toilet Transfer   Type of Assistance Needed Physical assistance   Physical Assistance Level 25% or less   Comment HHA R hand and gait belt to toilet with Myla for balance and improving weight shift   Toilet Transfer CARE Score 3   Therapeutic Interventions   Balance static standing x 2' for pericare that patient did herself and managing of lower body dressing from physical therapist with Myla x 1 that progressed quickly to modA x 1 for balance as patient fatigued   Equipment Use   NuStep 10' (5', 2', 3' with breaks for total of 10 minutes\" RPM > 30 to improve LE strength and prime for ambulation   Assessment   Treatment Assessment Pt participated in 45 minute PT session this afternoon to continue assessment from evaluation. Patient continues to be limited by fatigue requiring increased assistance for balance. Trialed multiple 30 foot ambulatory trials in order to improve LUE engagement and  to prime her for use of a RW. Discussed with patient regarding grasshopper recommendation that she requires an employee to be in the bathroom with her due to safety. She may require additional education on safety in later sessions. At end of session, pt in bed with call bell in reach, all needs met, and alarm " on.   Plan   Treatment/Interventions Functional transfer training;LE strengthening/ROM;Elevations;Therapeutic exercise;Endurance training;Patient/family training;Gait training   PT Therapy Minutes   PT Time In 1245   PT Time Out 1330   PT Total Time (minutes) 45   PT Mode of treatment - Individual (minutes) 45   PT Mode of treatment - Concurrent (minutes) 0   PT Mode of treatment - Group (minutes) 0   PT Mode of treatment - Co-treat (minutes) 0   PT Mode of Treatment - Total time(minutes) 45 minutes   PT Cumulative Minutes 90   Therapy Time missed   Time missed? No

## 2025-05-16 NOTE — ASSESSMENT & PLAN NOTE
WBC count currently 14.84.  No active s/s of infection.  Likely steroid induced.  Continue to trend routine CBC.

## 2025-05-16 NOTE — PROGRESS NOTES
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 Independent - Patient completed the activities by him/herself, with or without an assistive device, with no assistance from a helper.  (per patient, she did not use an assistive device at home)   Stairs 2. Needed Some Help - Patient needed a partial assistance from another person to complete activities.  (per patient report, she would descend stairs on her butt. When her home health aide was there to help, she would also scoot down steps on her butt. But was able to go up the stairs one and a time with assistance from her aide)   Prior Device Used Z. None of the above  (Has RW at home but did not use)   Falls in the Last Year   Number of falls in the past 12 months 2   Type of Injury Associated with Fall No injury   Patient Preference   Nickname (Patient Preference) Gloria   Restrictions/Precautions   Precautions Fall Risk   Pain Assessment   Pain Assessment Tool 0-10   Pain Score No Pain   Transfer Bed/Chair/Wheelchair   Limitations Noted In Balance;Coordination;LE Strength;UE Strength   Type of Assistance Needed Physical assistance   Physical Assistance Level 25% or less   Comment Sit pivot transfer to the left side with Myla x 1 for balance and direction to attend to LUE/LLE   Chair/Bed-to-Chair Transfer CARE Score 3   Roll Left and Right   Type of Assistance Needed Physical assistance   Physical Assistance Level 26%-50%   Comment Pt able to roll to L side independently without use of bedrail, however, to the R side, she was unable to complete 1/2 turn and did not initiate the roll with the LUE. modA from therapist to complete the roll onto the R side.   Roll Left and Right CARE Score 3   Sit to  Lying   Type of Assistance Needed Incidental touching   Comment CGA for safety due to poor balance. Patient able to manage BLE into bed   Sit to Lying CARE Score 4   Lying to Sitting on Side of Bed   Type of Assistance Needed Incidental touching   Comment CGA for safety due to decreased trunk balance. Minimal push through the LUE to complete lying to sitting EOB   Lying to Sitting on Side of Bed CARE Score 4   Sit to Stand   Type of Assistance Needed Physical assistance  (HHA)   Physical Assistance Level 26%-50%   Comment multiple sit to stands completed throughout session with Myla x 1 that progressed to modA x 1 as patient fatigued. Patient required intermittent vc to push up with the LUE and attend to the L side. with RW, she required multiple cues for safe hand placement.   Sit to Stand CARE Score 3   Picking Up Object   Comment Pt fatigued during session. Will assess next session this afternoon   Reason if not Attempted Safety concerns   Picking Up Object CARE Score 88   Car Transfer   Type of Assistance Needed Physical assistance  (HHA)   Physical Assistance Level 25% or less   Comment Myla with HHA at Nu-step to simulate SUV transfer. Patient required Myla for balance. She was able to manage BLE into and out of the car but required momentum to manage the LLE.   Car Transfer CARE Score 3   Walk 10 Feet   Type of Assistance Needed Adaptive equipment;Physical assistance   Physical Assistance Level 26%-50%   Comment Myla x 1 with HHA that progressed to modA with fatigue. Gait belt donned throughout all ambulatory trials. Improper weight shift noted to L side with intermittent L foot drag through swing that improved with cues to attend to LLE. Trialed with RW, and patient with improved gait pattern with decreased frequency of toe drag, however, required modA for RW management as patient with decreased attention to L side.   Walk 10 Feet CARE Score 3   Walk 50 Feet with Two Turns   Type of Assistance Needed  Physical assistance;Adaptive equipment   Physical Assistance Level Total assistance   Comment modA x 1 with gait belt and HHA with improper lateral weight shift, decreased foot clearance with intermittent toe drag during swing phase LLE and with quick head turn, posterior LOB requiring modA to correct. Close w/c follow due to fatigue.   Walk 50 Feet with Two Turns CARE Score 1   Walk 150 Feet   Reason if not Attempted Safety concerns   Walk 150 Feet CARE Score 88   Walking 10 Feet on Uneven Surfaces   Reason if not Attempted Safety concerns   Walking 10 Feet on Uneven Surfaces CARE Score 88   Wheelchair mobility   Method Right upper extremity;Left upper extremity;Right lower extremity;Left lower extremity   Distance Level Surface (feet) 5 ft   Findings Attempted w/c mobility, however, patient unable to maintain forward propulsion due to significant L weakness and tendency to veer to the L.   Wheel 50 Feet with Two Turns   Reason if not Attempted Activity not applicable   Wheel 50 Feet with Two Turns CARE Score 9   Wheel 150 Feet   Reason if not Attempted Activity not applicable   Wheel 150 Feet CARE Score 9   Curb or Single Stair   Style negotiated Single stair   Type of Assistance Needed Adaptive equipment;Physical assistance  (bilateral HR)   Comment Myla for balance with heavy reliance on RUE to complete step up   1 Step (Curb) CARE Score -   4 Steps   Type of Assistance Needed Physical assistance;Adaptive equipment  (bilateral HR)   Physical Assistance Level 26%-50%   Comment Pt able to negotiate 5 steps at bottom step only of the practice stairs with Myla for balance with R step up pattern observed and use of BUE. Progressed to modA for balance after 4 steps   4 Steps CARE Score 3   12 Steps   Reason if not Attempted Safety concerns   12 Steps CARE Score 88   Comprehension   QI: Comprehension 3. Usually Understands: Understands most conversations, but misses some part/intent of message. Requires cues at times  "to understand.   Expression   QI: Expression 3. Exhibits some difficulty with expressing needs and ideas (e.g., some words or finishing thoughts) or speech is not clear   RLE Assessment   RLE Assessment WFL   Strength RLE   RLE Overall Strength 4/5   R Hip Flexion 4/5   R Hip ABduction 4/5   R Knee Extension 4/5   R Ankle Dorsiflexion 4/5   LLE Assessment   LLE Assessment X   Strength LLE   L Hip Flexion 3/5   L Hip ABduction 3-/5  (visible L trendelenburg)   L Knee Extension 3/5   L Ankle Dorsiflexion 3/5   Tone LLE   LLE Tone Hypotonic   Cognition   Following Commands Follows one step commands with increased time or repetition   Perception   Inattention/Neglect Cues to attend to left side of body   Discharge Information   Patient's Rehab Expectations \"to get more strength to go home\"   Barriers to Discharge Home Limited Family Support;Decreased Strength;Decreased Endurance;Safety Considerations   Impressions Pt is a 74 y.o. female presenting today to [facility] for impairment of mobility/safety/functional mobility s/p R MCA 2 weeks ago and admission to [facility] on 5/12 due to worsening L sided weakness and fall. During the admission, MRI showed slightly worsening R MCA infarct as well as progressive brain metastasis. PMH includes  stage IV lung adenocarcinoma with brain mets, status post radiation, with a recent admission found to have a right MCA frontal lobe stroke, hypertension, history of PE, hypothyroidism. PTA she reports that she was independent in ADLs, transfers, and gait with HHA 5 hours a day/6 days a week to assist with stair negotiation and companionship primarily. She does not drive, and relies on her ex- for transport to and from the grocery store. She lives in a 2 story home with 6 steps to enter with HR on R side, as well as 12 stairs inside the house with HR on R side. PTA she admits to going down the stairs on her butt due to fear of falling. She presents with " significant fatigue, only tolerating 45 minute evaluation this AM, with make up session planned for this afternoon. She demonstrates significant balance deficits, as well as decreased endurance, L side inattention, impaired gait, and decreased strength. She will benefit from skilled physical therapy to address the above deficits in order to improve her functional mobility and decrease caregiver burden. ELOS: 14-21 days. Discharge goal to home with 24/7 supervision.   PT Therapy Minutes   PT Time In 1000   PT Time Out 1045   PT Total Time (minutes) 45   PT Mode of treatment - Individual (minutes) 45   PT Mode of treatment - Concurrent (minutes) 0   PT Mode of treatment - Group (minutes) 0   PT Mode of treatment - Co-treat (minutes) 0   PT Mode of Treatment - Total time(minutes) 45 minutes   PT Cumulative Minutes 45   Cumulative Minutes   Cumulative therapy minutes 45

## 2025-05-16 NOTE — PLAN OF CARE
Problem: PAIN - ADULT  Goal: Verbalizes/displays adequate comfort level or baseline comfort level  Description: Interventions:  - Encourage patient to monitor pain and request assistance  - Assess pain using appropriate pain scale  - Administer analgesics as ordered based on type and severity of pain and evaluate response  - Implement non-pharmacological measures as appropriate and evaluate response  - Consider cultural and social influences on pain and pain management  - Notify physician/advanced practitioner if interventions unsuccessful or patient reports new pain  - Educate patient/family on pain management process including their role and importance of  reporting pain   - Provide non-pharmacologic/complimentary pain relief interventions  Outcome: Progressing     Problem: SAFETY ADULT  Goal: Maintains/Returns to pre admission functional level  Description: INTERVENTIONS:  - Perform AM-PAC 6 Click Basic Mobility/ Daily Activity assessment daily.  - Set and communicate daily mobility goal to care team and patient/family/caregiver.   - Collaborate with rehabilitation services on mobility goals if consulted  - Perform Range of Motion 3 times a day.  - Reposition patient every 2 hours.  - Dangle patient 3 times a day  - Stand patient 3 times a day  - Ambulate patient 3 times a day  - Out of bed to chair 3 times a day   - Out of bed for meals 3 times a day  - Out of bed for toileting  - Record patient progress and toleration of activity level   Outcome: Progressing     Problem: NEUROSENSORY - ADULT  Goal: Achieves maximal functionality and self care  Description: INTERVENTIONS  - Monitor swallowing and airway patency with patient fatigue and changes in neurological status  - Encourage and assist patient to increase activity and self care.   - Encourage visually impaired, hearing impaired and aphasic patients to use assistive/communication devices  Outcome: Progressing     Problem: GENITOURINARY - ADULT  Goal:  Maintains or returns to baseline urinary function  Description: INTERVENTIONS:  - Assess urinary function  - Encourage oral fluids to ensure adequate hydration if ordered  - Administer IV fluids as ordered to ensure adequate hydration  - Administer ordered medications as needed  - Offer frequent toileting  - Follow urinary retention protocol if ordered  Outcome: Progressing     Problem: SKIN/TISSUE INTEGRITY - ADULT  Goal: Skin Integrity remains intact(Skin Breakdown Prevention)  Description: Assess:  -Perform Benjamín assessment every shift  -Clean and moisturize skin every shift  -Inspect skin when repositioning, toileting, and assisting with ADLS  -Assess extremities for adequate circulation and sensation     Bed Management:  -Have minimal linens on bed & keep smooth, unwrinkled  -Change linens as needed when moist or perspiring  -Avoid sitting or lying in one position for more than 2 hours while in bed  -Keep HOB at 30degrees     Toileting:  -Offer bedside commode  -Assess for incontinence every shift    Activity:  -Mobilize patient 3 times a day  -Encourage activity and walks on unit  -Encourage or provide ROM exercises   -Turn and reposition patient every 2 Hours  -Use appropriate equipment to lift or move patient in bed  -Instruct/ Assist with weight shifting every 2 hours when out of bed in chair  -Consider limitation of chair time 2 hour intervals    Skin Care:  -Avoid use of baby powder, tape, friction and shearing, hot water or constrictive clothing  -Do not massage red bony areas    Outcome: Progressing

## 2025-05-16 NOTE — PROGRESS NOTES
05/16/25 1000   Rehab Team Interventions   PT Interventions Gait Training;Neuromuscualr Reeducation;Therapeutic Exercise;Transfer Training;Bed Mobility;Modalities;Patient/Family Education   PT Transfer Goal   Roll left and right Goal 06. Independent - Patient completes the activity by him/herself with no assistance from a helper.   Sit to lying Goal 06. Independent - Patient completes the activity by him/herself with no assistance from a helper.   Lying to sitting on side of bed Goal 06. Independent - Patient completes the activity by him/herself with no assistance from a helper.   Sit to stand Goal 04. Supervision or touching assistance- [place] provides VERBAL CUES or supervision throughout activity.   Chair/bed-to-chair transfer Goal 04. Supervision or touching assistance- [place] provides VERBAL CUES or supervision throughout activity.   Car Transfer Goal 04. Supervision or touching assistance- [place]ette provides VERBAL CUES or supervision throughout activity.   Assistive Device Roller Walker  (HHA pending progress)   Status Target goal - three weeks   Locomotion Goal   Primary discharge mode of locomotion Walking   Target Walk Distance 150 ft   Assist Device Roller Walker;Hand Hold   Gait Pattern Improvement L foot drag;Narrow BILL;Improper weight shift   Walk 10 feet Goal 04. Supervision or touching assistance- [place] provides VERBAL CUES or supervision throughout activity.   Walk 50 feet with 2 turns Goal 04. Supervision or touching assistance- [place]ette provides VERBAL CUES or supervision throughout activity.   Walk 150 feet Goal 04. Supervision or touching assistance- Marinette provides VERBAL CUES or supervision throughout activity.   Walking Goal Status Target goal - three weeks   Stairs Goal   1 step or curb goal 04. Supervision or touching assistance- [place] provides VERBAL CUES or supervision throughout activity.   4 steps Goal 04. Supervision or touching assistance- [place]ette provides VERBAL CUES or supervision  throughout activity.   12 steps Goal 04. Supervision or touching assistance- [place] provides VERBAL CUES or supervision throughout activity.   Assist Level Supervision   Number of Stairs 12   Technique Non-reciprocal   Hand Rail Right   Status Target goal - three weeks   Object Retrieval Goal   Picking up object Goal 04. Supervision or touching assistance- [place] provides VERBAL CUES or supervision throughout activity.   Assistive Device  Reacher   Small Object Picked Up marker

## 2025-05-16 NOTE — PROGRESS NOTES
Progress Note - Internal Medicine   Name: Ayla Nye 74 y.o. female I MRN: [id number]  Unit/Bed#: -01 I Date of Admission: 5/15/2025   Date of Service: 5/16/2025 I Hospital Day: 1    Assessment & Plan  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)  Diagnosed in 07/2024.  Hx of radiation in 08/2024 and 01/2025.  Hx of hold on treatments due to multiple admission/toxicities/PCP pneumonia/PE.  Most recently receiving Taoxtere with plans to start chemotherapy after admission.    MRI brain showed stable enhancing lesions within the L superior occipital lobe and L parietal lobe with surrounding vasogenic edema, stable foci of restricted diffusion within the R posterior frontal lobe most likely sequela of subacute ischemia.  CT CAP showed multiple lesions with increase in size.    Continue increased dose of dexamethasone 3mg BID.  Continue Bactrim -160mg MWF.  Continue Keppra 1000mg BID.    Follows with Dr. Castro (Oncology), Dr Vuong (Radiation/Oncology), and Palliative as outpatient.  Follow-up with Neurology in 4-6 weeks as outpatient.  HTN (hypertension)  Continue home amlodipine 5mg daily.  Monitor BP with routine VS.  Insomnia  Continue melatonin 3mg and Seroquel 12.5mg at HS.  Hypothyroidism  Continue home levothyroxine 50mcg daily.  TSH 0.408 and T4 0.79 on 5/12.  Recommend repeat TSH in 6 weeks as outpatient.  Anemia  Hgb currently stable at 10.1.  Baseline 9-10.  Continue home cyanocobalamin 1000mcg daily.  Asymptomatic.  Continue to trend routine CBC.  Leukocytosis  WBC count currently 14.84.  No active s/s of infection.  Likely steroid induced.  Continue to trend routine CBC.  History of pulmonary embolism  Most recent CTA chest/PE study in 03/2025 showed no PE.  Had been on Xarelto in the past but discontinued due to new foci of hemorrhage within the L occipital metastasis.  Monitor for s/s of reoccurrence.  GERD (gastroesophageal reflux disease)  Continue home Protonix 40mg  daily.  Stroke-like symptoms  Presented on 25 after experiencing mechanical fall with head strike.  Hx of L sided weakness s/p stroke in 2025.  CTH showed vasogenic edema L parietal vertex compatible with known brain metastasis, no hemorrhage identified.  MRI brain showed stable enhancing lesions within the L superior occipital lobe and L parietal lobe with surrounding vasogenic edema, stable foci of restricted diffusion within the R posterior frontal lobe most likely sequela of subacute ischemia.    Per Neurology - symptoms likely due to some increase in size of CNS metastasis on top of noted vasogenic edema.  Home dexamethasone increased from 2mg BID to 3mg BID.    Neurovascular checks Q shift.  Follow-up with Neurology in 4-6 weeks as outpatient.    Primary team following.  PT/OT/SLP.  History of stroke  R frontal subacute infarct in 2025.  Continue ASA 81mg daily and Lipitor 40mg daily.  Continue with PT/OT.  Follow-up with Neurovascular in 4-6 weeks as outpatient.    VTE Pharmacologic Prophylaxis:   Pharmacologic: Enoxaparin (Lovenox)  Mechanical VTE Prophylaxis in Place: Yes - sequential compression devices.    Current Length of Stay: 1 day(s)    Current Patient Status: Inpatient Rehab     Discharge Plan: As per primary team.    Code Status: Level 3 - DNAR and DNI    Subjective:   Pt examined while pt sitting in bed in pt room.  Still feeling fatigued and states that she still did not sleep well last night.  Denies any fevers, chills, SOB, palpitations, or CP.  Feels that her LUE weakness is about the same as yesterday.  Denies any headaches, lightheadedness, or dizziness.  Complaints of posterior neck pain and L shoulder pain, chronic, 4/10, improves with Tylenol and Voltaren gel.    Objective:     Vitals:   Temp (24hrs), Av.2 °F (36.8 °C), Min:97.9 °F (36.6 °C), Max:98.8 °F (37.1 °C)    Temp:  [97.9 °F (36.6 °C)-98.8 °F (37.1 °C)] 97.9 °F (36.6 °C)  HR:  [73-89] 89  Resp:  [18] 18  BP:  (127-168)/(80-93) 127/81  SpO2:  [93 %-94 %] 94 %  Body mass index is 21.41 kg/m².     Review of Systems   Constitutional:  Positive for fatigue. Negative for appetite change, chills and fever.   HENT:  Negative for trouble swallowing.    Eyes:  Negative for visual disturbance.   Respiratory:  Negative for cough, shortness of breath, wheezing and stridor.    Cardiovascular:  Negative for chest pain, palpitations and leg swelling.   Gastrointestinal:  Negative for abdominal distention, abdominal pain, constipation, diarrhea, nausea and vomiting.        LBM 5/15   Genitourinary:  Negative for difficulty urinating.   Musculoskeletal:  Positive for arthralgias (L shoulder pain, 4/10, improves with Voltaren and Tylenol), back pain and neck pain (posterior neck pain, 4/10, improves with Lidoderm patch, chronic).   Neurological:  Positive for weakness (LUE weakness, unchanged from yesterday). Negative for dizziness, light-headedness, numbness and headaches.   Psychiatric/Behavioral:  Positive for sleep disturbance. Negative for dysphoric mood. The patient is not nervous/anxious.    All other systems reviewed and are negative.       Input and Output Summary (last 24 hours):       Intake/Output Summary (Last 24 hours) at 5/16/2025 0921  Last data filed at 5/16/2025 0855  Gross per 24 hour   Intake 360 ml   Output --   Net 360 ml       Physical Exam:     Physical Exam  Vitals and nursing note reviewed.   Constitutional:       General: She is not in acute distress.     Appearance: Normal appearance. She is not ill-appearing.   HENT:      Head: Normocephalic and atraumatic.     Cardiovascular:      Rate and Rhythm: Normal rate and regular rhythm.      Pulses: Normal pulses.      Heart sounds: Normal heart sounds. No murmur heard.     No friction rub.   Pulmonary:      Effort: Pulmonary effort is normal. No respiratory distress.      Breath sounds: Examination of the left-lower field reveals decreased breath sounds. Decreased  breath sounds present. No wheezing or rhonchi.   Abdominal:      General: Abdomen is flat. Bowel sounds are normal. There is no distension.      Palpations: Abdomen is soft. There is no mass.      Tenderness: There is no abdominal tenderness. There is no guarding or rebound.      Hernia: No hernia is present.     Musculoskeletal:      Cervical back: Normal range of motion and neck supple. No tenderness.      Right lower leg: No edema.      Left lower leg: No edema.     Skin:     General: Skin is warm and dry.      Capillary Refill: Capillary refill takes less than 2 seconds.     Neurological:      Mental Status: She is alert and oriented to person, place, and time.      Motor: Weakness (LUE strength 4/5) present.     Psychiatric:         Mood and Affect: Mood normal.         Behavior: Behavior normal.         Additional Data:     Labs:    Results from last 7 days   Lab Units 05/16/25  0537 05/15/25  0510   WBC Thousand/uL 14.84* 14.75*   HEMOGLOBIN g/dL 10.1* 9.4*   HEMATOCRIT % 34.0* 31.2*   PLATELETS Thousands/uL 333 318   LYMPHO PCT %  --  4*   MONO PCT %  --  2*   EOS PCT %  --  0     Results from last 7 days   Lab Units 05/16/25  0537   SODIUM mmol/L 140   POTASSIUM mmol/L 4.2   CHLORIDE mmol/L 104   CO2 mmol/L 26   BUN mg/dL 25   CREATININE mg/dL 0.86   ANION GAP mmol/L 10   CALCIUM mg/dL 9.0   ALBUMIN g/dL 3.8   TOTAL BILIRUBIN mg/dL 0.23   ALK PHOS U/L 53   ALT U/L 13   AST U/L 11*   GLUCOSE RANDOM mg/dL 117         Results from last 7 days   Lab Units 05/14/25  1116 05/14/25  0744 05/13/25  2140 05/13/25  1544 05/12/25  1400   POC GLUCOSE mg/dl 163* 95 157* 177* 180*               Labs reviewed    Imaging:    Imaging reviewed    Recent Cultures (last 7 days):           Last 24 Hours Medication List:   Current Facility-Administered Medications   Medication Dose Route Frequency Provider Last Rate    acetaminophen  975 mg Oral Q6H KRISS Claudine Leos PA-C      aluminum-magnesium hydroxide-simethicone  30 mL  Oral Q4H PRN Claudine Leos PA-C      amLODIPine  5 mg Oral Daily Claudine Leos PA-C      aspirin  81 mg Oral Daily Claudine Leos PA-C      atorvastatin  40 mg Oral Daily With Dinner Claudine Leos PA-C      vitamin B-12  1,000 mcg Oral Daily Claudine Leos PA-C      dexamethasone  3 mg Oral BID Claudine Leos PA-C      Diclofenac Sodium  2 g Topical 4x Daily Claudine Leos PA-C      enoxaparin  40 mg Subcutaneous Q24H Claudine Leos PA-C      gabapentin  200 mg Oral Daily Claudine Leos PA-C      And    gabapentin  300 mg Oral HS Claudine Leos PA-C      levETIRAcetam  1,000 mg Oral BID Claudine Leos PA-C      levothyroxine  50 mcg Oral Early Morning Claudine Leos PA-C      lidocaine  1 patch Topical Daily Claudine Leos PA-C      melatonin  3 mg Oral HS Claudine Leos PA-C      methocarbamol  250 mg Oral Q6H PRN Claudine Leos PA-C      pantoprazole  40 mg Oral Daily Before Breakfast Claudine Leos PA-C      QUEtiapine  12.5 mg Oral HS Claudine Leos PA-C      senna  1 tablet Oral Daily Claudine Leos PA-C      sulfamethoxazole-trimethoprim  1 tablet Oral Once per day on Monday Wednesday Friday Claudine Leos PA-C          M*Modal software was used to dictate this note.  It may contain errors with dictating incorrect words or incorrect spelling. Please contact the provider directly with any questions.

## 2025-05-16 NOTE — PLAN OF CARE
Problem: PAIN - ADULT  Goal: Verbalizes/displays adequate comfort level or baseline comfort level  Description: Interventions:  - Encourage patient to monitor pain and request assistance  - Assess pain using appropriate pain scale  - Administer analgesics as ordered based on type and severity of pain and evaluate response  - Implement non-pharmacological measures as appropriate and evaluate response  - Consider cultural and social influences on pain and pain management  - Notify physician/advanced practitioner if interventions unsuccessful or patient reports new pain  - Educate patient/family on pain management process including their role and importance of  reporting pain   - Provide non-pharmacologic/complimentary pain relief interventions  Outcome: Progressing     Problem: NEUROSENSORY - ADULT  Goal: Achieves stable or improved neurological status  Description: INTERVENTIONS  - Monitor and report changes in neurological status  - Monitor vital signs such as temperature, blood pressure, glucose, and any other labs ordered       Outcome: Progressing

## 2025-05-17 PROCEDURE — 97130 THER IVNTJ EA ADDL 15 MIN: CPT

## 2025-05-17 PROCEDURE — 92523 SPEECH SOUND LANG COMPREHEN: CPT

## 2025-05-17 PROCEDURE — 97129 THER IVNTJ 1ST 15 MIN: CPT

## 2025-05-17 PROCEDURE — 97110 THERAPEUTIC EXERCISES: CPT

## 2025-05-17 PROCEDURE — 97530 THERAPEUTIC ACTIVITIES: CPT

## 2025-05-17 PROCEDURE — 97116 GAIT TRAINING THERAPY: CPT

## 2025-05-17 PROCEDURE — 99232 SBSQ HOSP IP/OBS MODERATE 35: CPT | Performed by: PHYSICAL MEDICINE & REHABILITATION

## 2025-05-17 PROCEDURE — 97112 NEUROMUSCULAR REEDUCATION: CPT

## 2025-05-17 RX ORDER — ACETAMINOPHEN 325 MG/1
650 TABLET ORAL DAILY
Status: DISCONTINUED | OUTPATIENT
Start: 2025-05-18 | End: 2025-05-30 | Stop reason: HOSPADM

## 2025-05-17 RX ORDER — QUETIAPINE FUMARATE 25 MG/1
25 TABLET, FILM COATED ORAL
Status: DISCONTINUED | OUTPATIENT
Start: 2025-05-17 | End: 2025-05-22

## 2025-05-17 RX ORDER — ACETAMINOPHEN 325 MG/1
975 TABLET ORAL DAILY
Status: DISCONTINUED | OUTPATIENT
Start: 2025-05-18 | End: 2025-05-30 | Stop reason: HOSPADM

## 2025-05-17 RX ORDER — ACETAMINOPHEN 325 MG/1
975 TABLET ORAL DAILY
Status: DISCONTINUED | OUTPATIENT
Start: 2025-05-17 | End: 2025-05-30 | Stop reason: HOSPADM

## 2025-05-17 RX ADMIN — DICLOFENAC SODIUM 2 G: 10 GEL TOPICAL at 17:03

## 2025-05-17 RX ADMIN — ASPIRIN 81 MG CHEWABLE TABLET 81 MG: 81 TABLET CHEWABLE at 08:05

## 2025-05-17 RX ADMIN — ENOXAPARIN SODIUM 40 MG: 40 INJECTION SUBCUTANEOUS at 13:39

## 2025-05-17 RX ADMIN — DICLOFENAC SODIUM 2 G: 10 GEL TOPICAL at 12:00

## 2025-05-17 RX ADMIN — ACETAMINOPHEN 650 MG: 325 TABLET, FILM COATED ORAL at 23:03

## 2025-05-17 RX ADMIN — ATORVASTATIN CALCIUM 40 MG: 40 TABLET, FILM COATED ORAL at 17:02

## 2025-05-17 RX ADMIN — METHOCARBAMOL TABLETS 250 MG: 500 TABLET, COATED ORAL at 12:07

## 2025-05-17 RX ADMIN — LEVOTHYROXINE SODIUM 50 MCG: 0.05 TABLET ORAL at 06:04

## 2025-05-17 RX ADMIN — AMLODIPINE BESYLATE 5 MG: 5 TABLET ORAL at 08:06

## 2025-05-17 RX ADMIN — ACETAMINOPHEN 650 MG: 325 TABLET, FILM COATED ORAL at 06:04

## 2025-05-17 RX ADMIN — DICLOFENAC SODIUM 2 G: 10 GEL TOPICAL at 08:06

## 2025-05-17 RX ADMIN — PANTOPRAZOLE SODIUM 40 MG: 40 TABLET, DELAYED RELEASE ORAL at 06:04

## 2025-05-17 RX ADMIN — LIDOCAINE 5% 1 PATCH: 700 PATCH TOPICAL at 08:06

## 2025-05-17 RX ADMIN — CYANOCOBALAMIN TAB 1000 MCG 1000 MCG: 1000 TAB at 08:06

## 2025-05-17 RX ADMIN — ACETAMINOPHEN 975 MG: 325 TABLET, FILM COATED ORAL at 17:02

## 2025-05-17 RX ADMIN — Medication 3 MG: at 21:06

## 2025-05-17 RX ADMIN — LEVETIRACETAM 1000 MG: 500 TABLET, FILM COATED ORAL at 08:06

## 2025-05-17 RX ADMIN — LEVETIRACETAM 1000 MG: 500 TABLET, FILM COATED ORAL at 21:06

## 2025-05-17 RX ADMIN — SENNOSIDES 8.6 MG: 8.6 TABLET, FILM COATED ORAL at 08:06

## 2025-05-17 RX ADMIN — DEXAMETHASONE 3 MG: 0.5 TABLET ORAL at 08:05

## 2025-05-17 RX ADMIN — DEXAMETHASONE 3 MG: 0.5 TABLET ORAL at 21:06

## 2025-05-17 RX ADMIN — DICLOFENAC SODIUM 2 G: 10 GEL TOPICAL at 21:10

## 2025-05-17 RX ADMIN — QUETIAPINE FUMARATE 25 MG: 25 TABLET ORAL at 21:06

## 2025-05-17 RX ADMIN — ACETAMINOPHEN 650 MG: 325 TABLET, FILM COATED ORAL at 12:00

## 2025-05-17 RX ADMIN — GABAPENTIN 300 MG: 300 CAPSULE ORAL at 21:06

## 2025-05-17 RX ADMIN — GABAPENTIN 200 MG: 100 CAPSULE ORAL at 08:06

## 2025-05-17 RX ADMIN — METHOCARBAMOL TABLETS 250 MG: 500 TABLET, COATED ORAL at 19:58

## 2025-05-17 NOTE — PROGRESS NOTES
SLP Cognitive Assessment       05/17/25 1358   Pain Assessment   Pain Assessment Tool 0-10   Pain Score 3   Pain Location/Orientation Location: Back   Hospital Pain Intervention(s) Repositioned  (pt reported taking tylenol eariler)   Restrictions/Precautions   Precautions Bed/chair alarms;Cognitive;Fall Risk;Supervision on toilet/commode   Cognitive Linguisitic Assessments   Cognitive Linguistic Quick Test (CLQT) Refer to below for full details.   Comprehension   Assist Devices Glasses   Auditory Basic;Complex   Visual Basic;Complex   Findings Pt completed formalized cognitive assessment. Refer to SLP Rehab note for full details.   QI: Comprehension 3. Usually Understands: Understands most conversations, but misses some part/intent of message. Requires cues at times to understand.   Comprehension (FIM) 4 - Understands basic info/conversation 75-90% of time   Expression   Verbal Basic;Complex   Non-Verbal Basic;Complex   Intelligibility Sentence   Findings Pt completed formalized cognitive assessment. Refer to SLP Rehab note for full details.   QI: Expression 3. Exhibits some difficulty with expressing needs and ideas (e.g., some words or finishing thoughts) or speech is not clear   Expression (FIM) 4 - Expresses basic info/needs 75-90% of time   Social Interaction   Cooperation with staff;with family   Participation Individual   Behaviors observed Appropriate   Findings Pt completed formalized cognitive assessment. Refer to SLP Rehab note for full details.   Social Interaction (FIM) 6 - Interacts appropriately with others BUT requires extra  time   Problem Solving   Complex Manages finances;Manages discharge planning;Manages medications   Routine Manages call bell   Findings Pt completed formalized cognitive assessment. Refer to SLP Rehab note for full details.   Problem solving (FIM) 3 - Solves basic problmes 50-74% of time   Memory   Recognize People Yes   Remember Routine Yes   Initiates Tasks Yes   Short-Term  Impaired   Long Term Intact   Findings Pt completed formalized cognitive assessment. Refer to SLP Rehab note for full details.   Memory (FIM) 3 - Recognizes, recalls/performs 50-74%   Speech/Language/Cognition Assessmetn   Treatment Assessment Pt completed the CLQT+ on initial evaluation with a Composite Severity Rating score of 3.4 out of 4.0, correlating to overall mildly impaired  cognitive linguistic impairments at time of evaluation and in comparison to age matched peers ranging from 70-90 y/o. Pt scored at or above criterion cut score for 6 out of 10 tasks completed. Pt's Cognitive Domain Scores are as follows:      Cognitive Domain: Score:  Severity Rating:   Attention   152 mildly impaired    Memory 143 WNL        Executive Functions 14 mildly impaired       Language 30 WNL       Visuospatial Skills  57 mildly impaired       Clock Drawing Screen    9 mildly impaired    Overall Composite Severity Rating Score 3.4 out of 4.0 mildly impaired       In addition to completing formalized assessment, SLP also engaging pt in review of orientation information to where pt was noted to be fully oriented to date, place/city and reasoning for hospitalization. Additionally reviewed LT biographical recall to where pt does report living alone, but does have supportive family (2dtrs- Juliana and Sue as well as ex-- Sandro). Pt does report independence in I ADL's prior to admission, but pr eventually reporting having a home yancy aid, Kavya who is in 6 out of 7 days a week from 2-7pm. SLP providing education about objective of completing assessment, to where pt's dtr, Juliana was also present to observe and SLP providing education on role of services. Pt was educated on results of assessment with review of overall results and performance on each cognitive domain, as well as specific focus on weaker areas, which will primarily be targeted during therapy sessions. Pt was receptive to all information provided, along with  recommendations for skilled SLP services during acute rehab stay to maximize overall cognitive linguistic skills.     Due to completing assessment in a timely manner, SLP was able to initiate structured task of written sequencing, problem solving, reasoning activity. Pt was provided written 4 and 5 step sequences. For the initial written 4-step sequences, pt was 32/32 accurate. As task increased to written 5-step sequences, pt did demonstrate slower processing time to complete, but was 40/40 accurate. It was noted that during this task, pt was demonstrating ability to self correct items accordingly. Pt was noted to be fatigue by the end of this task to where SLP providing pt and dtr education in regard to mental fatigue, keeping sessions shorter (~30 min) and spacing out therapies throughout the day as well. Pt and pt's dtr verbalizing understanding given education and recommendations w/o further questions at end of session. Pt will continue to benefit from ongoing skilled SLP services targeting functional independence of cognitive skills in hopes for decreasing need for caregiver burden over time.    SLP Therapy Minutes   SLP Time In 0830   SLP Time Out 0930   SLP Total Time (minutes) 60   SLP Mode of treatment - Individual (minutes) 60   SLP Mode of treatment - Concurrent (minutes) 0   SLP Mode of treatment - Group (minutes) 0   SLP Mode of treatment - Co-treat (minutes) 0   SLP Mode of Treatment - Total time(minutes) 60 minutes   SLP Cumulative Minutes 60   Therapy Time missed   Time missed? No

## 2025-05-17 NOTE — PROGRESS NOTES
05/17/25 1030   Pain Assessment   Pain Score No Pain   Restrictions/Precautions   Precautions Fall Risk   Sit to Lying   Type of Assistance Needed Incidental touching   Physical Assistance Level No physical assistance   Sit to Lying CARE Score 4   Lying to Sitting on Side of Bed   Type of Assistance Needed Incidental touching   Physical Assistance Level No physical assistance   Lying to Sitting on Side of Bed CARE Score 4   Sit to Stand   Type of Assistance Needed Incidental touching   Physical Assistance Level No physical assistance   Sit to Stand CARE Score 4   Bed-Chair Transfer   Type of Assistance Needed Physical assistance   Physical Assistance Level 25% or less   Comment SPT   Chair/Bed-to-Chair Transfer CARE Score 3   Toileting Hygiene   Type of Assistance Needed Physical assistance   Physical Assistance Level 26%-50%   Comment A for clothing management   Toileting Hygiene CARE Score 3   Toilet Transfer   Type of Assistance Needed Physical assistance   Physical Assistance Level 25% or less   Toilet Transfer CARE Score 3   Neuromuscular Education   Comments To improve LUE coordination, strength, and functional grasp, patient engaged in a variety of NMR exercises aimed at improving grasp-release, pincer grasp, and functional reach. To further challenge Gloria, placed items to the left to promote L sided attention. Rest breaks required throughout.   Cognition   Overall Cognitive Status WFL   Arousal/Participation Alert;Responsive;Arousable;Cooperative   Attention Attends with cues to redirect   Comments Pleasant and cooperative; required cuing throughout interventions for good form. Mildly impulsive at times.   Perception   Perception Yes   Left Attention Left inattention requiring cues to attend to items on left side and often would get hand caught on left   Additional Activities   Additional Activities Comments UE assessments completed  UPPER EXTREMITY ASSESSMENTS  9 Hole Peg Test  Right: 22.6 seconds  Left:  unable to complete one peg after 1 minute Box & Block Test  Right: 41 blocks  Left: 8 blocks      Strength  Right: 32#  Left: 8# Pinch Strength  Lateral  Right: 8.3  Left: 5.7    Three Point  Right: 6.7  Left: 2.3      Assessment   Treatment Assessment Fair tolerance of session today with fatigue. Rest breaks provided throughout session. She demonstrates significant LUE weakness and incoordination, as seen by assessments above. Recommend continued OT treatment.   Problem List Decreased strength;Decreased endurance;Impaired balance;Decreased mobility;Decreased coordination;Decreased safety awareness;Impaired tone   Plan   Treatment/Interventions ADL retraining;Functional transfer training;LE strengthening/ROM;Elevations;Therapeutic exercise;Endurance training;Patient/family training;Continued evaluation   OT Therapy Minutes   OT Time In 1030   OT Time Out 1130   OT Total Time (minutes) 60   OT Mode of treatment - Individual (minutes) 60   OT Mode of treatment - Concurrent (minutes) 0   OT Mode of treatment - Group (minutes) 0   OT Mode of treatment - Co-treat (minutes) 0   OT Mode of Treatment - Total time(minutes) 60 minutes   OT Cumulative Minutes 150

## 2025-05-17 NOTE — PROGRESS NOTES
"   05/17/25 1430   Pain Assessment   Pain Score No Pain   Restrictions/Precautions   Precautions Fall Risk;Bed/chair alarms;Supervision on toilet/commode   Cognition   Arousal/Participation Alert;Cooperative   Attention Attends with cues to redirect   Memory Decreased short term memory;Decreased recall of recent events;Decreased recall of precautions   Following Commands Follows one step commands with increased time or repetition   Comments L UE inattention   Subjective   Subjective \"I have not slept well since they gave me steroids\"   Sit to Lying   Type of Assistance Needed Incidental touching   Sit to Lying CARE Score 4   Lying to Sitting on Side of Bed   Type of Assistance Needed Incidental touching   Lying to Sitting on Side of Bed CARE Score 4   Sit to Stand   Type of Assistance Needed Physical assistance   Physical Assistance Level 25% or less   Sit to Stand CARE Score 3   Bed-Chair Transfer   Type of Assistance Needed Physical assistance   Physical Assistance Level 25% or less   Comment with no AD   Chair/Bed-to-Chair Transfer CARE Score 3   Walk 10 Feet   Type of Assistance Needed Physical assistance   Physical Assistance Level 26%-50%   Comment min to mod A with R HHA, min A with RW   Walk 10 Feet CARE Score 3   Walk 50 Feet with Two Turns   Type of Assistance Needed Physical assistance   Physical Assistance Level 51%-75%   Comment mod A with R HHA, min A with RW with L hand scoop   Walk 50 Feet with Two Turns CARE Score 2   Walk 150 Feet   Comment limited by fatigue   Reason if not Attempted Safety concerns   Walk 150 Feet CARE Score 88   Walking 10 Feet on Uneven Surfaces   Reason if not Attempted Safety concerns   Walking 10 Feet on Uneven Surfaces CARE Score 88   Ambulation   Primary Mode of Locomotion Prior to Admission Walk   Distance Walked (feet) 54 ft  (with R HHA, 50 and 30 feet with RW using L hand scoop)   Assist Device Roller Walker;Hand Hold   Gait Pattern L foot drag;Decreased foot " clearance;Improper weight shift;Narrow BILL;Step to;Poor UE WB   Limitations Noted In Balance;Device Management;Endurance   Provided Assistance with: Balance;Weight Shift;Direction   Walk Assist Level Moderate Assist   Findings Trialed DF assist ace wrap on L foot as well as RW with L hand scoop . Pt. min A with RW and able to shift wt,  to the R better that HHA   Does the patient walk? 2. Yes   Wheel 50 Feet with Two Turns   Reason if not Attempted Activity not applicable   Wheel 50 Feet with Two Turns CARE Score 9   Wheel 150 Feet   Reason if not Attempted Activity not applicable   Wheel 150 Feet CARE Score 9   Wheelchair mobility   Does the patient use a wheelchair? 0. No   Curb or Single Stair   Style negotiated Single stair   Type of Assistance Needed Adaptive equipment;Physical assistance   Physical Assistance Level 26%-50%   Comment using B HR on  steps decsneding backwards   1 Step (Curb) CARE Score 3   4 Steps   Type of Assistance Needed Physical assistance   Physical Assistance Level 26%-50%   Comment using B HR on  steps decsneding backwards   4 Steps CARE Score 3   12 Steps   Reason if not Attempted Safety concerns   12 Steps CARE Score 88   Stairs   Type Stairs   # of Steps 4   Weight Bearing Precautions Fall Risk   Assist Devices Bilateral Rail   Toilet Transfer   Type of Assistance Needed Physical assistance   Physical Assistance Level 26%-50%   Comment SPT w/c<> commode over toilet   Toilet Transfer CARE Score 3   Toilet Transfer   Findings max A with pants management   Therapeutic Interventions   Flexibility B hamstring and gastroc stretching   Equipment Use   NuStep Level 1 B UE and LE, 5 mins , rest break and then 5 mins more   Other Comments   Comments HR  BPM and SPo2 98% with activity   Assessment   Treatment Assessment Pt. engaged in 60 mins session and was able to tolerate above activities with rest breaks as needed. Pt. cont to be limited by fatigue from lack of sleep last  night but is very motivated and willing to participate. Able to trial use of RW with L hand scoop and was able to walk with better wt shifiting to the R to clear L LE and improve balance. Pt. though cont to benefit from HHA ambulation for HIGT but cont to assess safest AD to be used to lessen burden of care and dec fall risk. Pt. had no other complaints and was assisted back to bed at end of session with all needs in place and alarms audible.   Problem List Decreased strength;Decreased endurance;Impaired balance;Decreased mobility;Decreased coordination;Decreased safety awareness   Barriers to Discharge Inaccessible home environment;Decreased caregiver support   PT Barriers   Functional Limitation Car transfers;Stair negotiation;Standing;Transfers;Walking   Plan   Treatment/Interventions Functional transfer training;LE strengthening/ROM;Elevations;Therapeutic exercise;Endurance training;Patient/family training;Equipment eval/education;Bed mobility;Gait training   Discharge Recommendation   Rehab Resource Intensity Level, PT   (TBD)   Equipment Recommended   (TBD)   PT Therapy Minutes   PT Time In 1430   PT Time Out 1530   PT Total Time (minutes) 60   PT Mode of treatment - Individual (minutes) 60   PT Mode of treatment - Concurrent (minutes) 0   PT Mode of treatment - Group (minutes) 0   PT Mode of treatment - Co-treat (minutes) 0   PT Mode of Treatment - Total time(minutes) 60 minutes   PT Cumulative Minutes 150   Therapy Time missed   Time missed? No

## 2025-05-17 NOTE — PLAN OF CARE
Problem: PAIN - ADULT  Goal: Verbalizes/displays adequate comfort level or baseline comfort level  Description: Interventions:  - Encourage patient to monitor pain and request assistance  - Assess pain using appropriate pain scale  - Administer analgesics as ordered based on type and severity of pain and evaluate response  - Implement non-pharmacological measures as appropriate and evaluate response  - Consider cultural and social influences on pain and pain management  - Notify physician/advanced practitioner if interventions unsuccessful or patient reports new pain  - Educate patient/family on pain management process including their role and importance of  reporting pain   - Provide non-pharmacologic/complimentary pain relief interventions  Outcome: Progressing     Problem: SAFETY ADULT  Goal: Patient will remain free of falls  Description: INTERVENTIONS:  - Educate patient/family on patient safety including physical limitations  - Instruct patient to call for assistance with activity   - Consult OT/PT to assist with strengthening/mobility   - Keep Call bell within reach  - Keep bed low and locked with side rails adjusted as appropriate  - Keep care items and personal belongings within reach  - Initiate and maintain comfort rounds  - Make Fall Risk Sign visible to staff  - Offer Toileting every 2 Hours, in advance of need  - Initiate/Maintain bed/chair alarm  - Obtain necessary fall risk management equipment: bed/chair sensor  - Apply yellow socks and bracelet for high fall risk patients  - Consider moving patient to room near nurses station  Outcome: Progressing

## 2025-05-17 NOTE — PLAN OF CARE
Problem: INFECTION - ADULT  Goal: Absence or prevention of progression during hospitalization  Description: INTERVENTIONS:  - Assess and monitor for signs and symptoms of infection  - Monitor lab/diagnostic results  - Monitor all insertion sites, i.e. indwelling lines, tubes, and drains  - Monitor endotracheal if appropriate and nasal secretions for changes in amount and color  - Beasley appropriate cooling/warming therapies per order  - Administer medications as ordered  - Instruct and encourage patient and family to use good hand hygiene technique  - Identify and instruct in appropriate isolation precautions for identified infection/condition  Outcome: Progressing     Problem: CARDIOVASCULAR - ADULT  Goal: Maintains optimal cardiac output and hemodynamic stability  Description: INTERVENTIONS:  - Monitor I/O, vital signs and rhythm  - Monitor for S/S and trends of decreased cardiac output  - Administer and titrate ordered vasoactive medications to optimize hemodynamic stability  - Assess quality of pulses, skin color and temperature  - Assess for signs of decreased coronary artery perfusion  - Instruct patient to report change in severity of symptoms  Outcome: Progressing     Problem: RESPIRATORY - ADULT  Goal: Achieves optimal ventilation and oxygenation  Description: INTERVENTIONS:  - Assess for changes in respiratory status  - Assess for changes in mentation and behavior  - Position to facilitate oxygenation and minimize respiratory effort  - Oxygen administered by appropriate delivery if ordered  - Initiate smoking cessation education as indicated  - Encourage broncho-pulmonary hygiene including cough, deep breathe, Incentive Spirometry  - Assess the need for suctioning and aspirate as needed  - Assess and instruct to report SOB or any respiratory difficulty  - Respiratory Therapy support as indicated  Outcome: Progressing     Problem: HEMATOLOGIC - ADULT  Goal: Maintains hematologic stability  Description:  INTERVENTIONS  - Assess for signs and symptoms of bleeding or hemorrhage  - Monitor labs  - Administer supportive blood products/factors as ordered and appropriate  Outcome: Progressing

## 2025-05-17 NOTE — PROGRESS NOTES
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 Pt additionally reporting a home health aid, Kavya who is present from 2-7pm to assist pt.   Restrictions/Precautions   Precautions Bed/chair alarms;Cognitive;Fall Risk;Supervision on toilet/commode   Pain Assessment   Pain Assessment Tool 0-10   Pain Score 3   Pain Location/Orientation Location: [facility] Pain Intervention(s) Repositioned  (pt reported taking tylenol eariler)   Comprehension   Assist Devices Glasses   Auditory Basic;Complex   Visual Basic;Complex   Findings Pt completed formalized cognitive assessment. Refer to SLP Rehab note for full details.   QI: Comprehension 3. Usually Understands: Understands most conversations, but misses some part/intent of message. Requires cues at times to understand.   Comprehension (FIM) 4 - Understands basic info/conversation 75-90% of time   Expression   Verbal Basic;Complex   Non-Verbal Basic;Complex   Intelligibility Sentence   Findings Pt completed formalized cognitive assessment. Refer to SLP Rehab note for full details.   QI: Expression 3. Exhibits some difficulty with expressing needs and ideas (e.g., some words or finishing thoughts) or speech is not clear   Expression (FIM) 4 - Expresses basic info/needs 75-90% of time   Social Interaction   Cooperation with staff;with family   Participation Individual   Behaviors observed Appropriate   Findings Pt completed formalized cognitive assessment. Refer to SLP Rehab note for full details.   Social Interaction (FIM) 6 - Interacts appropriately with others BUT requires extra  time   Problem Solving   Complex Manages finances;Manages  "discharge planning;Manages medications   Routine Manages call bell   Findings Pt completed formalized cognitive assessment. Refer to SLP Rehab note for full details.   Problem solving (FIM) 3 - Solves basic problmes 50-74% of time   Memory   Recognize People Yes   Remember Routine Yes   Initiates Tasks Yes   Short-Term Impaired   Long Term Intact   Findings Pt completed formalized cognitive assessment. Refer to SLP Rehab note for full details.   Memory (FIM) 3 - Recognizes, recalls/performs 50-74%   Discharge Information   Patient's Discharge Plan home w/ family support/supervision   Patient's Rehab Expectations \"to get a good night's sleep\"   Barriers to Discharge Home Decreased Cognitive Function;Decreased Strength;Decreased Endurance   Impressions Pt is a 73 y/o female w/ PMH of lung adenocarcinoma w/ metastatic disease involving her brain, prior ischemic stroke in April 2025, GERD, hyperlipidemia, hypertension, history of pulmonary embolism, presenting to [facility] due to a progressive cancer metastases, recent fall, recent stroke and debility. She was admitted from 5/5 to 5/7 due to left-sided hemiparesis as a result of right MCA territory frontal lobe stroke. Xarelto was discontinued in April 2025 after new foci of hemorrhage within the left occipital metastasis She presented to the ED again on 5/12 after a fall with headstrike. Imaging did not show an acute traumatic injury, though did show progression of metastases. Palliative care, neurology, and heme onc consulted. She was evaluated by PT and OT and deemed an appropiate candidate for [facility] due to functional deficits.     Currently SLP services were consulted for assessment of cognitive linguistic skills. Pt able to complete formalized cognitive assessment in which pt is demonstrating cognitive skills to be mildly impaired at this time. Pt is a a good rehab candidate to achieve supervision level for functional cognitive skills upon anticipated discharge home w/ " family support/supervision. Barriers which present at this time include: decreased attention, visual attention, ST memory recall , problem solving, reasoning, sequencing, organization of thoughts, judgement, and slower processing . In order to address the noted barriers, skilled SLP services will address this by targeting the following interventions: verbal problem solving task, verbal working memory tasks, visual memory recall tasks, drawing conclusions activities, written sequencing tasks, verbal sequencing tasks, categorization tasks , written financial management tasks, verbal review of current medications, written review of medications, written calendar tasks, tangible scheduling tasks , functional reading tasks , and family education/training. ELOS ~ 10-14 days . At this time, pt will benefit from skilled SLP services targeting functional independence of cognitive linguistic skills in attempts to decrease need for caregiver burden over time.   SLP Therapy Minutes   SLP Time In 0830   SLP Time Out 0930   SLP Total Time (minutes) 60   SLP Mode of treatment - Individual (minutes) 60   SLP Mode of treatment - Concurrent (minutes) 0   SLP Mode of treatment - Group (minutes) 0   SLP Mode of treatment - Co-treat (minutes) 0   SLP Mode of Treatment - Total time(minutes) 60 minutes   SLP Cumulative Minutes 60   Cumulative Minutes   Cumulative therapy minutes 240

## 2025-05-17 NOTE — PROGRESS NOTES
Physical Medicine and Rehabilitation Progress Note  Ayla Nye 74 y.o. female MRN: [id number]  Unit/Bed#: -01 Encounter: [id number]    Etiology/Reason for Admission: Impairment of mobility, safety, Activities of Daily Living (ADLs), and cognitive/communication skills due to Brain Dysfunction:  02.1  Non-Traumatic    Interval History: No acute events overnight. Seen face-to-face at bedside. Reports another poor night of sleep last night. We will increase quetiapine to 25 mg qHs starting to night. She is also starting to have more left shoulder/lower neck muscle soreness and right low back/buttock discomfort since reducing the acetaminophen dose. Will increase this again, maintaining under safe total dosing threshold. Voiding spontaneously, continent. Last BM 5/17/25, continent.     Functional Update:  Physical Therapy Occupational Therapy Speech Therapy   Mod assist for bed mobility  Mod assist for sit to stand  Min assist for ambulation Set-up for eating  Min assist for oral hygiene  Mod assist for bathing  Max assist for UB dressing  Mod to max assist for LB dressing  Mod assist for toilet hygiene  Mod assist for toilet transfer  FIM 4 - comprehension  FIM 4 - expression  FIM 6 - social interaction  FIM 3 - problem solving  FIM 3 - memory  CLQT+ 3.4 out of 4.0, mildly impaired      Assessment/Plan -     Ms. Ayla Nye is a 73 yo woman with a history of lung adenocarcinoma with metastatic disease involving her brain and left sided hemiparesis who presented initially to the hospital on 5/12/25 for evaluation following a mechanical fall with reported head strike. She was found to have stable space-occupying lesions with the left superior occipital lobe and left parietal lobe with associated vasogenic edema as well as an area of diffusion restriction within the right posterior frontal lobe, consistent with likely subacute ischemia. She was evaluated by oncology and neurology and started on oral  "steroids.     - Stage IV adenocarcinoma of the lung metastatic to brain: diagnosed in 2024, s/p concurrent chemotherapy/RT with carboplatin and paclitaxel from 5/2024 to 7/2024, SRS to brain metastases on 8/8/24, palliative RT to right gluteal mass in 10/2024, again SRS to left frontal lobe lesion on 1/16/25, carboplatin/pemetrexed/pembrolizumab from 10/2024 to 2/2025, dose-reduced docetaxel from 4/2025 but currently held (will continue to hold until outpatient follow-up); dexamethasone 3 mg BID until follow-up with oncology; keppra BID for seizure prophylaxis  - Leukocytosis: secondary to ongoing steroids; monitor for localizing signs/symptoms of infection; trend on CBC  - HTN: amlodipine, appreciate IM recommendations, management  - Hypothyroidism: Levothyroxine  - Anemia: trend Hgb on CBC; appreciate IM recommendations  - History of PE: previously treated with rivaroxaban but discontinued after she sustained foci of hemorrhage within the left occipital brain metastasis; VTE chemoprophylaxis as below  - History of stroke: involving right posterior frontal lobe, per neurology current symptoms are not suspected to be related to acute stroke; ASA, statin for secondary prevention; outpatient follow-up with neurology  - At risk for delirium:   - Maintain adequate nutrition/nutrition consult  - Avoid sedating and anticholinergic medications as possible  - Promote regular sleep/wake cycles and proper sleep hygiene  - Monitor for bowel/bladder incontinence and ensure timed voids are scheduled and bowel program is routine and consistent  - Proximity to RN station  - Quetiapine to assist in sleep/wake regulation, see below under \"sleep\"  - Bed alarms activated  - GERD: PPI  - Therapies: PT, OT and SLP  - Pain:   - Tylenol [phone] mg q6h scheduled  - Gabapentin 200-300 mg BID  - Lidocaine patches daily  - Methocarbamol 250 mg q6h PRN  - Bowel: Monitor closely for constipation and/or incontinence. Will follow BMs and " adjust bowel regimen to target soft, formed stools q1-2 days, per pt's regular schedule.  - Bladder: Monitor closely for urinary incontinence and/or retention. Will follow UOP and encourage spontaneous voids. If unable to void spontaneously, will monitor with PVR bladder scans and initiate CIC regimen.  - Skin: Monitor for breakdown, frequent turns, pressure offloading  - Sleep: Melatonin, quetiapine 25 mg qHs (increased 5/17); practice effective sleep hygiene (e.g., consistent night and morning routine, quiet, dark room at night, no electronic devices/screens in bed, avoid large meals/caffeine before bed)  - VTE prophylaxis: Enoxaparin for chemoprophylaxis while inpatient  - Disposition: Pending initial interdisciplinary team meeting, home with assist, TITO 10-14 days    Review of Systems:  A 10 point ROS was performed; negative except as noted above.     Current Medications[1]    Physical Exam:  Temp:  [98 °F (36.7 °C)-98.7 °F (37.1 °C)] 98.4 °F (36.9 °C)  HR:  [75-95] 80  Resp:  [18] 18  BP: (126-144)/(74-85) 141/78  SpO2:  [93 %-95 %] 93 %    GEN: Patient seen resting comfortably in bedside chair, NAD  HEENT: NCAT; mucous membranes moist  CV: No extremity edema  PULM: Breathing comfortably on room air  ABD: Non-distended  SKIN: No obvious rashes or lesions on exposed skin  NEURO:  Awake and alert, answering questions appropriately to context; able to follow simple commands with clear consistency  Speech is fluent and organized  Moving all extremities spontaneously in chair    Laboratory:  Labs reviewed, none new  Results from last 7 days   Lab Units 05/16/25  0537 05/15/25  0510 05/14/25  0455   HEMOGLOBIN g/dL 10.1* 9.4* 9.3*   HEMATOCRIT % 34.0* 31.2* 30.4*   WBC Thousand/uL 14.84* 14.75* 12.92*     Results from last 7 days   Lab Units 05/16/25  0537 05/13/25  0436 05/12/25  1423   BUN mg/dL 25 24 23   SODIUM mmol/L 140 139 141   POTASSIUM mmol/L 4.2 4.5 4.3   CHLORIDE mmol/L 104 106 104   CREATININE mg/dL  0.86 0.88 1.00   AST U/L 11*  --  9*   ALT U/L 13  --  10            Diagnostic Studies: Reviewed, no new imaging   No orders to display     Total time spent:  35 minutes, with more than 50% spent counseling/coordinating care. Counseling includes discussion with patient re: progress in therapies, functional issues observed by therapy staff, and discussion with patient his/her current medical state/wellbeing. Coordination of patient's care was performed in conjunction with Internal Medicine service to monitor patient's labs, vitals, and management of their comorbidities.     Cristian Dias MD  Attending Physician  Physical Medicine and Rehabilitation  [facility]         [1]   Current Facility-Administered Medications:     [START ON 5/18/2025] acetaminophen (TYLENOL) tablet 975 mg, 975 mg, Oral, Daily **AND** [START ON 5/18/2025] acetaminophen (TYLENOL) tablet 650 mg, 650 mg, Oral, Daily **AND** acetaminophen (TYLENOL) tablet 975 mg, 975 mg, Oral, Daily, 975 mg at 05/17/25 1702 **AND** [START ON 5/18/2025] acetaminophen (TYLENOL) tablet 650 mg, 650 mg, Oral, Daily, Cristian Dias MD    aluminum-magnesium hydroxide-simethicone (MAALOX) oral suspension 30 mL, 30 mL, Oral, Q4H PRN, Claudine Leos PA-C, 30 mL at 05/16/25 2327    amLODIPine (NORVASC) tablet 5 mg, 5 mg, Oral, Daily, Claudine Leos PA-C, 5 mg at 05/17/25 0806    aspirin chewable tablet 81 mg, 81 mg, Oral, Daily, Claudine Leos PA-C, 81 mg at 05/17/25 0805    atorvastatin (LIPITOR) tablet 40 mg, 40 mg, Oral, Daily With Dinner, Claudine Leos PA-C, 40 mg at 05/17/25 1702    cyanocobalamin (VITAMIN B-12) tablet 1,000 mcg, 1,000 mcg, Oral, Daily, Claudine Leos PA-C, 1,000 mcg at 05/17/25 0806    dexamethasone (DECADRON) tablet 3 mg, 3 mg, Oral, BID, Claudine Leos PA-C, 3 mg at 05/17/25 0805    Diclofenac Sodium (VOLTAREN) 1 % topical gel 2 g, 2 g, Topical, 4x Daily, Claudine Leos PA-C, 2 g at 05/17/25 1703    enoxaparin  (LOVENOX) subcutaneous injection 40 mg, 40 mg, Subcutaneous, Q24H, LUCA Wells-C, 40 mg at 05/17/25 1339    gabapentin (NEURONTIN) capsule 200 mg, 200 mg, Oral, Daily, 200 mg at 05/17/25 0806 **AND** gabapentin (NEURONTIN) capsule 300 mg, 300 mg, Oral, HS, LUCA Wells-C, 300 mg at 05/16/25 2030    levETIRAcetam (KEPPRA) tablet 1,000 mg, 1,000 mg, Oral, BID, LUCA Wells-C, 1,000 mg at 05/17/25 0806    levothyroxine tablet 50 mcg, 50 mcg, Oral, Early Morning, LUCA Wells-C, 50 mcg at 05/17/25 0604    lidocaine (LIDODERM) 5 % patch 1 patch, 1 patch, Topical, Daily, NAREN WellsC, 1 patch at 05/17/25 0806    melatonin tablet 3 mg, 3 mg, Oral, HS, LUCA Wells-C, 3 mg at 05/16/25 2030    methocarbamol (ROBAXIN) tablet 250 mg, 250 mg, Oral, Q6H PRN, LUCA Wells-C, 250 mg at 05/17/25 1207    pantoprazole (PROTONIX) EC tablet 40 mg, 40 mg, Oral, Daily Before Breakfast, LUCA Wells-C, 40 mg at 05/17/25 0604    QUEtiapine (SEROquel) tablet 25 mg, 25 mg, Oral, HS, Cristian Dias MD    senna (SENOKOT) tablet 8.6 mg, 1 tablet, Oral, Daily, LUCA Wells-C, 8.6 mg at 05/17/25 0806    sulfamethoxazole-trimethoprim (BACTRIM DS) 800-160 mg per tablet 1 tablet, 1 tablet, Oral, Once per day on Monday Wednesday Friday, LUCA Wells-C, 1 tablet at 05/16/25 0844

## 2025-05-18 PROCEDURE — 97129 THER IVNTJ 1ST 15 MIN: CPT

## 2025-05-18 PROCEDURE — 97130 THER IVNTJ EA ADDL 15 MIN: CPT

## 2025-05-18 RX ADMIN — DEXAMETHASONE 3 MG: 0.5 TABLET ORAL at 09:25

## 2025-05-18 RX ADMIN — PANTOPRAZOLE SODIUM 40 MG: 40 TABLET, DELAYED RELEASE ORAL at 06:12

## 2025-05-18 RX ADMIN — METHOCARBAMOL TABLETS 250 MG: 500 TABLET, COATED ORAL at 09:36

## 2025-05-18 RX ADMIN — GABAPENTIN 200 MG: 100 CAPSULE ORAL at 09:28

## 2025-05-18 RX ADMIN — DICLOFENAC SODIUM 2 G: 10 GEL TOPICAL at 21:10

## 2025-05-18 RX ADMIN — GABAPENTIN 300 MG: 300 CAPSULE ORAL at 21:09

## 2025-05-18 RX ADMIN — LEVETIRACETAM 1000 MG: 500 TABLET, FILM COATED ORAL at 09:24

## 2025-05-18 RX ADMIN — ATORVASTATIN CALCIUM 40 MG: 40 TABLET, FILM COATED ORAL at 17:03

## 2025-05-18 RX ADMIN — LEVOTHYROXINE SODIUM 50 MCG: 0.05 TABLET ORAL at 06:12

## 2025-05-18 RX ADMIN — DICLOFENAC SODIUM 2 G: 10 GEL TOPICAL at 18:37

## 2025-05-18 RX ADMIN — ALUMINUM HYDROXIDE, MAGNESIUM HYDROXIDE, AND DIMETHICONE 30 ML: 200; 20; 200 SUSPENSION ORAL at 20:34

## 2025-05-18 RX ADMIN — LEVETIRACETAM 1000 MG: 500 TABLET, FILM COATED ORAL at 21:09

## 2025-05-18 RX ADMIN — ACETAMINOPHEN 650 MG: 325 TABLET, FILM COATED ORAL at 12:22

## 2025-05-18 RX ADMIN — METHOCARBAMOL TABLETS 250 MG: 500 TABLET, COATED ORAL at 20:30

## 2025-05-18 RX ADMIN — ACETAMINOPHEN 975 MG: 325 TABLET, FILM COATED ORAL at 17:03

## 2025-05-18 RX ADMIN — DICLOFENAC SODIUM 2 G: 10 GEL TOPICAL at 09:28

## 2025-05-18 RX ADMIN — ACETAMINOPHEN 975 MG: 325 TABLET, FILM COATED ORAL at 06:12

## 2025-05-18 RX ADMIN — ASPIRIN 81 MG CHEWABLE TABLET 81 MG: 81 TABLET CHEWABLE at 09:27

## 2025-05-18 RX ADMIN — DEXAMETHASONE 3 MG: 0.5 TABLET ORAL at 21:08

## 2025-05-18 RX ADMIN — ENOXAPARIN SODIUM 40 MG: 40 INJECTION SUBCUTANEOUS at 14:37

## 2025-05-18 RX ADMIN — QUETIAPINE FUMARATE 25 MG: 25 TABLET ORAL at 21:08

## 2025-05-18 RX ADMIN — AMLODIPINE BESYLATE 5 MG: 5 TABLET ORAL at 09:28

## 2025-05-18 RX ADMIN — CYANOCOBALAMIN TAB 1000 MCG 1000 MCG: 1000 TAB at 09:24

## 2025-05-18 RX ADMIN — ACETAMINOPHEN 650 MG: 325 TABLET, FILM COATED ORAL at 23:03

## 2025-05-18 RX ADMIN — Medication 3 MG: at 21:09

## 2025-05-18 RX ADMIN — LIDOCAINE 5% 1 PATCH: 700 PATCH TOPICAL at 09:28

## 2025-05-18 NOTE — PLAN OF CARE
Problem: PAIN - ADULT  Goal: Verbalizes/displays adequate comfort level or baseline comfort level  Description: Interventions:  - Encourage patient to monitor pain and request assistance  - Assess pain using appropriate pain scale  - Administer analgesics as ordered based on type and severity of pain and evaluate response  - Implement non-pharmacological measures as appropriate and evaluate response  - Consider cultural and social influences on pain and pain management  - Notify physician/advanced practitioner if interventions unsuccessful or patient reports new pain  - Educate patient/family on pain management process including their role and importance of  reporting pain   - Provide non-pharmacologic/complimentary pain relief interventions  Outcome: Progressing     Problem: MOBILITY - ADULT  Goal: Maintain or return to baseline ADL function  Description: INTERVENTIONS:  -  Assess patient's ability to carry out ADLs; assess patient's baseline for ADL function and identify physical deficits which impact ability to perform ADLs (bathing, care of mouth/teeth, toileting, grooming, dressing, etc.)  - Assess/evaluate cause of self-care deficits   - Assess range of motion  - Assess patient's mobility; develop plan if impaired  - Assess patient's need for assistive devices and provide as appropriate  - Encourage maximum independence but intervene and supervise when necessary  - Involve family in performance of ADLs  - Assess for home care needs following discharge   - Consider OT consult to assist with ADL evaluation and planning for discharge  - Provide patient education as appropriate  - Monitor functional capacity and physical performance, use of AM PAC & JH-HLM   - Monitor gait, balance and fatigue with ambulation    Outcome: Progressing

## 2025-05-18 NOTE — PROGRESS NOTES
"   05/18/25 5719   Pain Assessment   Pain Assessment Tool 0-10   Pain Score No Pain   Restrictions/Precautions   Precautions Bed/chair alarms;Cognitive;Fall Risk;Pain;Supervision on toilet/commode   Comprehension   Comprehension (FIM) 5 - Understands basic directions and conversation   Expression   Expression (FIM) 5 - Needs help/cues only RARELY (< 10% of the time)   Social Interaction   Social Interaction (FIM) 6 - Interacts appropriately with others BUT requires extra  time   Problem Solving   Problem solving (FIM) 4 - Solves basic problems 75-89% of time   Memory   Memory (FIM) 3 - Recognizes, recalls/performs 50-74%   Speech/Language/Cognition Assessmetn   Treatment Assessment Engaged pt in continued activity which was initiated in yesterday's session targeting written sequencing, problem solving, reasoning activity. Pt was provided written 6 step sequences. It was noted that pt does continue to require increased processing time to complete tasks but was still able to maintain accuracy overall in task being 48/48 accurate. Pt does continue to demonstrate ability to self correct errors, but did have some mild difficulty given 1 task (making scrambled eggs) to where even w/ pt's attempts to self correct, still had difficulty. SLP providing cue to \"start fresh\" to where pt was then able to complete given increased time.     Also targeting ST/working memory recall to where SLP provided pt w/ 4 words and pt was to recall target word by attribute inclusion. Pt was able to ID target words presented in 8 out 12 opportunities. Pt was requesting repetition of information which did increase ability to ID the target word to increase to 12/12 accuracy. It was noted that given this task, pt was demonstrating difficulty in recalling the initial words when asked, needing that repetition and able to ID words later in list due to still being in recall. Pt began to complete self rehearsal of words after SLP presented them which " did improve her ability to recall target words as task progressed. Began to educate both pt and pt's dtr, Sue who was present at session about ST memory strategies and how self rehearsal is commonly used to maximize recall of the information to not forget. At this time, pt will continue to benefit from ongoing skilled SLP services targeting functional cognitive linguistic skills in hopes for decreasing caregiver burden over time.   SLP Therapy Minutes   SLP Time In 0930   SLP Time Out 1000   SLP Total Time (minutes) 30   SLP Mode of treatment - Individual (minutes) 30   SLP Mode of treatment - Concurrent (minutes) 0   SLP Mode of treatment - Group (minutes) 0   SLP Mode of treatment - Co-treat (minutes) 0   SLP Mode of Treatment - Total time(minutes) 30 minutes   SLP Cumulative Minutes 90   Therapy Time missed   Time missed? No

## 2025-05-18 NOTE — PLAN OF CARE
Problem: SAFETY ADULT  Goal: Patient will remain free of falls  Description: INTERVENTIONS:  - Educate patient/family on patient safety including physical limitations  - Instruct patient to call for assistance with activity   - Consult OT/PT to assist with strengthening/mobility   - Keep Call bell within reach  - Keep bed low and locked with side rails adjusted as appropriate  - Keep care items and personal belongings within reach  - Initiate and maintain comfort rounds  - Make Fall Risk Sign visible to staff  - Offer Toileting every 2 Hours, in advance of need  - Initiate/Maintain bed/chair alarm  - Obtain necessary fall risk management equipment: bed/chair sensor  - Apply yellow socks and bracelet for high fall risk patients  - Consider moving patient to room near nurses station  Outcome: Progressing     Problem: GASTROINTESTINAL - ADULT  Goal: Maintains or returns to baseline bowel function  Description: INTERVENTIONS:  - Assess bowel function  - Encourage oral fluids to ensure adequate hydration  - Administer IV fluids if ordered to ensure adequate hydration  - Administer ordered medications as needed  - Encourage mobilization and activity  - Consider nutritional services referral to assist patient with adequate nutrition and appropriate food choices  Outcome: Progressing

## 2025-05-19 LAB
ANION GAP SERPL CALCULATED.3IONS-SCNC: 10 MMOL/L (ref 4–13)
ANISOCYTOSIS BLD QL SMEAR: PRESENT
BASOPHILS # BLD MANUAL: 0 THOUSAND/UL (ref 0–0.1)
BASOPHILS NFR MAR MANUAL: 0 % (ref 0–1)
BUN SERPL-MCNC: 20 MG/DL (ref 5–25)
CALCIUM SERPL-MCNC: 9.1 MG/DL (ref 8.4–10.2)
CHLORIDE SERPL-SCNC: 104 MMOL/L (ref 96–108)
CO2 SERPL-SCNC: 26 MMOL/L (ref 21–32)
CREAT SERPL-MCNC: 0.82 MG/DL (ref 0.6–1.3)
EOSINOPHIL # BLD MANUAL: 0 THOUSAND/UL (ref 0–0.4)
EOSINOPHIL NFR BLD MANUAL: 0 % (ref 0–6)
ERYTHROCYTE [DISTWIDTH] IN BLOOD BY AUTOMATED COUNT: 16.6 % (ref 11.6–15.1)
GFR SERPL CREATININE-BSD FRML MDRD: 70 ML/MIN/1.73SQ M
GLUCOSE SERPL-MCNC: 111 MG/DL (ref 65–140)
HCT VFR BLD AUTO: 34.1 % (ref 34.8–46.1)
HGB BLD-MCNC: 10.3 G/DL (ref 11.5–15.4)
LYMPHOCYTES # BLD AUTO: 0.64 THOUSAND/UL (ref 0.6–4.47)
LYMPHOCYTES # BLD AUTO: 4 % (ref 14–44)
MCH RBC QN AUTO: 27.5 PG (ref 26.8–34.3)
MCHC RBC AUTO-ENTMCNC: 30.2 G/DL (ref 31.4–37.4)
MCV RBC AUTO: 91 FL (ref 82–98)
MONOCYTES # BLD AUTO: 0.64 THOUSAND/UL (ref 0–1.22)
MONOCYTES NFR BLD: 4 % (ref 4–12)
MYELOCYTE ABSOLUTE CT: 0.8 THOUSAND/UL (ref 0–0.1)
MYELOCYTES NFR BLD MANUAL: 5 % (ref 0–1)
NEUTROPHILS # BLD MANUAL: 13.89 THOUSAND/UL (ref 1.85–7.62)
NEUTS BAND NFR BLD MANUAL: 2 % (ref 0–8)
NEUTS SEG NFR BLD AUTO: 85 % (ref 43–75)
PLATELET # BLD AUTO: 315 THOUSANDS/UL (ref 149–390)
PLATELET BLD QL SMEAR: ADEQUATE
PMV BLD AUTO: 9.1 FL (ref 8.9–12.7)
POTASSIUM SERPL-SCNC: 4.7 MMOL/L (ref 3.5–5.3)
RBC # BLD AUTO: 3.75 MILLION/UL (ref 3.81–5.12)
RBC MORPH BLD: PRESENT
SODIUM SERPL-SCNC: 140 MMOL/L (ref 135–147)
WBC # BLD AUTO: 15.97 THOUSAND/UL (ref 4.31–10.16)

## 2025-05-19 PROCEDURE — 85007 BL SMEAR W/DIFF WBC COUNT: CPT | Performed by: NURSE PRACTITIONER

## 2025-05-19 PROCEDURE — 80048 BASIC METABOLIC PNL TOTAL CA: CPT | Performed by: NURSE PRACTITIONER

## 2025-05-19 PROCEDURE — 99232 SBSQ HOSP IP/OBS MODERATE 35: CPT | Performed by: PHYSICAL MEDICINE & REHABILITATION

## 2025-05-19 PROCEDURE — 97530 THERAPEUTIC ACTIVITIES: CPT

## 2025-05-19 PROCEDURE — 97535 SELF CARE MNGMENT TRAINING: CPT

## 2025-05-19 PROCEDURE — 85027 COMPLETE CBC AUTOMATED: CPT | Performed by: NURSE PRACTITIONER

## 2025-05-19 PROCEDURE — 99232 SBSQ HOSP IP/OBS MODERATE 35: CPT | Performed by: INTERNAL MEDICINE

## 2025-05-19 PROCEDURE — 97130 THER IVNTJ EA ADDL 15 MIN: CPT

## 2025-05-19 PROCEDURE — 97112 NEUROMUSCULAR REEDUCATION: CPT

## 2025-05-19 PROCEDURE — 97110 THERAPEUTIC EXERCISES: CPT

## 2025-05-19 PROCEDURE — 97129 THER IVNTJ 1ST 15 MIN: CPT

## 2025-05-19 RX ORDER — AMLODIPINE BESYLATE 10 MG/1
10 TABLET ORAL DAILY
Status: DISCONTINUED | OUTPATIENT
Start: 2025-05-20 | End: 2025-05-30 | Stop reason: HOSPADM

## 2025-05-19 RX ADMIN — AMLODIPINE BESYLATE 5 MG: 5 TABLET ORAL at 08:05

## 2025-05-19 RX ADMIN — LIDOCAINE 5% 1 PATCH: 700 PATCH TOPICAL at 08:07

## 2025-05-19 RX ADMIN — ATORVASTATIN CALCIUM 40 MG: 40 TABLET, FILM COATED ORAL at 17:12

## 2025-05-19 RX ADMIN — SULFAMETHOXAZOLE AND TRIMETHOPRIM 1 TABLET: 800; 160 TABLET ORAL at 08:05

## 2025-05-19 RX ADMIN — ENOXAPARIN SODIUM 40 MG: 40 INJECTION SUBCUTANEOUS at 13:42

## 2025-05-19 RX ADMIN — DEXAMETHASONE 3 MG: 0.5 TABLET ORAL at 08:06

## 2025-05-19 RX ADMIN — METHOCARBAMOL TABLETS 250 MG: 500 TABLET, COATED ORAL at 21:17

## 2025-05-19 RX ADMIN — GABAPENTIN 200 MG: 100 CAPSULE ORAL at 08:06

## 2025-05-19 RX ADMIN — GABAPENTIN 300 MG: 300 CAPSULE ORAL at 21:18

## 2025-05-19 RX ADMIN — ACETAMINOPHEN 975 MG: 325 TABLET, FILM COATED ORAL at 17:12

## 2025-05-19 RX ADMIN — ACETAMINOPHEN 650 MG: 325 TABLET, FILM COATED ORAL at 12:09

## 2025-05-19 RX ADMIN — LEVETIRACETAM 1000 MG: 500 TABLET, FILM COATED ORAL at 08:06

## 2025-05-19 RX ADMIN — DICLOFENAC SODIUM 2 G: 10 GEL TOPICAL at 21:19

## 2025-05-19 RX ADMIN — PANTOPRAZOLE SODIUM 40 MG: 40 TABLET, DELAYED RELEASE ORAL at 06:24

## 2025-05-19 RX ADMIN — ASPIRIN 81 MG CHEWABLE TABLET 81 MG: 81 TABLET CHEWABLE at 08:06

## 2025-05-19 RX ADMIN — SENNOSIDES 8.6 MG: 8.6 TABLET, FILM COATED ORAL at 08:06

## 2025-05-19 RX ADMIN — Medication 3 MG: at 21:18

## 2025-05-19 RX ADMIN — ACETAMINOPHEN 975 MG: 325 TABLET, FILM COATED ORAL at 06:24

## 2025-05-19 RX ADMIN — LEVOTHYROXINE SODIUM 50 MCG: 0.05 TABLET ORAL at 06:24

## 2025-05-19 RX ADMIN — DEXAMETHASONE 3 MG: 0.5 TABLET ORAL at 21:16

## 2025-05-19 RX ADMIN — METHOCARBAMOL TABLETS 250 MG: 500 TABLET, COATED ORAL at 05:28

## 2025-05-19 RX ADMIN — DICLOFENAC SODIUM 2 G: 10 GEL TOPICAL at 12:09

## 2025-05-19 RX ADMIN — ACETAMINOPHEN 650 MG: 325 TABLET, FILM COATED ORAL at 23:20

## 2025-05-19 RX ADMIN — QUETIAPINE FUMARATE 25 MG: 25 TABLET ORAL at 21:18

## 2025-05-19 RX ADMIN — CYANOCOBALAMIN TAB 1000 MCG 1000 MCG: 1000 TAB at 08:07

## 2025-05-19 RX ADMIN — LEVETIRACETAM 1000 MG: 500 TABLET, FILM COATED ORAL at 21:18

## 2025-05-19 RX ADMIN — DICLOFENAC SODIUM 2 G: 10 GEL TOPICAL at 08:07

## 2025-05-19 RX ADMIN — ALUMINUM HYDROXIDE, MAGNESIUM HYDROXIDE, AND DIMETHICONE 30 ML: 200; 20; 200 SUSPENSION ORAL at 21:19

## 2025-05-19 RX ADMIN — DICLOFENAC SODIUM 2 G: 10 GEL TOPICAL at 17:12

## 2025-05-19 NOTE — PROGRESS NOTES
Progress Note - Internal Medicine   Name: Ayla Nye 74 y.o. 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  Hx of radiation in 08/2024 and 01/2025.  Hx of hold on treatments due to multiple admission/toxicities/PCP pneumonia/PE.  Most recently receiving Taoxtere with plans to start chemotherapy after admission.    MRI brain showed stable enhancing lesions within the L superior occipital lobe and L parietal lobe with surrounding vasogenic edema, stable foci of restricted diffusion within the R posterior frontal lobe most likely sequela of subacute ischemia.  CT CAP showed multiple lesions with increase in size.    Continue increased dose of dexamethasone 3mg BID.  Continue Bactrim -160mg MWF.  Continue Keppra 1000mg BID.    Follows with Dr. Castro (Oncology), Dr Vuong (Radiation/Oncology), and Palliative as outpatient.  Follow-up with Neurology in 4-6 weeks as outpatient.  HTN (hypertension)  Continue home amlodipine 5mg daily.  Increase amlodipine to 10mg daily today.  Monitor BP with routine VS.  Insomnia  Continue melatonin 3mg and Seroquel 25mg at HS.  Hypothyroidism  Continue home levothyroxine 50mcg daily.  TSH 0.408 and T4 0.79 on 5/12.  Recommend repeat TSH in 6 weeks as outpatient.  Anemia  Hgb currently stable at 10.3.  Baseline 9-10.  Continue home cyanocobalamin 1000mcg daily.  Asymptomatic.  Continue to trend routine CBC.  Leukocytosis  WBC count currently 15.97.  No active s/s of infection.  Likely steroid induced.  Continue to trend routine CBC.  History of pulmonary embolism  Most recent CTA chest/PE study in 03/2025 showed no PE.  Had been on Xarelto in the past but discontinued due to new foci of hemorrhage within the L occipital metastasis.  Monitor for s/s of reoccurrence.  GERD (gastroesophageal reflux  disease)  Continue home Protonix 40mg daily.  Stroke-like symptoms  Presented on 25 after experiencing mechanical fall with head strike.  Hx of L sided weakness s/p stroke in 2025.  CTH showed vasogenic edema L parietal vertex compatible with known brain metastasis, no hemorrhage identified.  MRI brain showed stable enhancing lesions within the L superior occipital lobe and L parietal lobe with surrounding vasogenic edema, stable foci of restricted diffusion within the R posterior frontal lobe most likely sequela of subacute ischemia.    Per Neurology - symptoms likely due to some increase in size of CNS metastasis on top of noted vasogenic edema.  Home dexamethasone increased from 2mg BID to 3mg BID.    Neurovascular checks Q shift.  Follow-up with Neurology in 4-6 weeks as outpatient.    Primary team following.  PT/OT/SLP.  History of stroke  R frontal subacute infarct in 2025.  Continue ASA 81mg daily and Lipitor 40mg daily.  Continue with PT/OT.  Follow-up with Neurovascular in 4-6 weeks as outpatient.    VTE Pharmacologic Prophylaxis:   Pharmacologic: Enoxaparin (Lovenox)  Mechanical VTE Prophylaxis in Place: Yes - sequential compression devices.    Current Length of Stay: 4 day(s)    Current Patient Status: Inpatient Rehab     Discharge Plan: As per primary team.    Code Status: Level 3 - DNAR and DNI    Subjective:   Pt examined while pt sitting in recliner in pt room.  Complaints of posterior back pain overnight along with L shoulder pain.  States that she uses a heating pad at night and this helps with her back pain at home - will order Aqua K pad at this time.  Denies any fevers, chills, lightheadedness, dizziness, SOB, palpitations, or CP.  Denies any abdominal pain.  LBM was yesterday without any issues.  Denies any urinary symptoms.  Sleeping better at night.  Feels that her LUE weakness has not yet improved but is not getting worse.    Objective:     Vitals:   Temp (24hrs), Av.2 °F (36.8  °C), Min:97.9 °F (36.6 °C), Max:98.4 °F (36.9 °C)    Temp:  [97.9 °F (36.6 °C)-98.4 °F (36.9 °C)] 97.9 °F (36.6 °C)  HR:  [81-86] 81  Resp:  [16-18] 18  BP: (139-152)/(71-99) 139/71  SpO2:  [92 %-93 %] 93 %  Body mass index is 21.41 kg/m².     Review of Systems   Constitutional:  Negative for appetite change, chills, fatigue and fever.   HENT:  Negative for trouble swallowing.    Eyes:  Negative for visual disturbance.   Respiratory:  Negative for cough, shortness of breath, wheezing and stridor.    Cardiovascular:  Negative for chest pain, palpitations and leg swelling.   Gastrointestinal:  Negative for abdominal distention, abdominal pain, constipation, diarrhea, nausea and vomiting.        LBM 5/18   Genitourinary:  Negative for difficulty urinating, dysuria, frequency, hematuria and urgency.   Musculoskeletal:  Positive for arthralgias (L shoulder pain, worse at night, improves with muscle relaxant) and back pain (lower back pain, chronic, worse at night, agreeable to Aqua K pad). Negative for gait problem.   Neurological:  Positive for weakness (LUE and LLE weakness, minimal to no improvement). Negative for dizziness, light-headedness and headaches.   Psychiatric/Behavioral:  Negative for dysphoric mood and sleep disturbance. The patient is not nervous/anxious.    All other systems reviewed and are negative.       Input and Output Summary (last 24 hours):       Intake/Output Summary (Last 24 hours) at 5/19/2025 0921  Last data filed at 5/19/2025 0801  Gross per 24 hour   Intake 540 ml   Output 350 ml   Net 190 ml       Physical Exam:     Physical Exam  Vitals and nursing note reviewed.   Constitutional:       General: She is not in acute distress.     Appearance: Normal appearance. She is not ill-appearing.   HENT:      Head: Normocephalic and atraumatic.     Cardiovascular:      Rate and Rhythm: Normal rate and regular rhythm.      Pulses: Normal pulses.      Heart sounds: Normal heart sounds. No murmur  heard.     No friction rub.   Pulmonary:      Effort: Pulmonary effort is normal. No respiratory distress.      Breath sounds: Normal breath sounds. No wheezing or rhonchi.   Abdominal:      General: Abdomen is flat. Bowel sounds are normal. There is no distension.      Palpations: Abdomen is soft. There is no mass.      Tenderness: There is no abdominal tenderness. There is no guarding or rebound.      Hernia: No hernia is present.     Musculoskeletal:      Cervical back: Normal range of motion and neck supple. No tenderness.      Right lower leg: No edema.      Left lower leg: No edema.     Skin:     General: Skin is warm and dry.     Neurological:      Mental Status: She is alert and oriented to person, place, and time.      Motor: Weakness (LUE strength 4/5) present.     Psychiatric:         Mood and Affect: Mood normal.         Behavior: Behavior normal.         Additional Data:     Labs:    Results from last 7 days   Lab Units 05/19/25  0523   WBC Thousand/uL 15.97*   HEMOGLOBIN g/dL 10.3*   HEMATOCRIT % 34.1*   PLATELETS Thousands/uL 315   BANDS PCT % 2   LYMPHO PCT % 4*   MONO PCT % 4   EOS PCT % 0     Results from last 7 days   Lab Units 05/19/25  0523 05/16/25  0537   SODIUM mmol/L 140 140   POTASSIUM mmol/L 4.7 4.2   CHLORIDE mmol/L 104 104   CO2 mmol/L 26 26   BUN mg/dL 20 25   CREATININE mg/dL 0.82 0.86   ANION GAP mmol/L 10 10   CALCIUM mg/dL 9.1 9.0   ALBUMIN g/dL  --  3.8   TOTAL BILIRUBIN mg/dL  --  0.23   ALK PHOS U/L  --  53   ALT U/L  --  13   AST U/L  --  11*   GLUCOSE RANDOM mg/dL 111 117         Results from last 7 days   Lab Units 05/14/25  1116 05/14/25  0744 05/13/25  2140 05/13/25  1544 05/12/25  1400   POC GLUCOSE mg/dl 163* 95 157* 177* 180*               Labs reviewed    Imaging:    Imaging reviewed    Recent Cultures (last 7 days):           Last 24 Hours Medication List:   Current Facility-Administered Medications   Medication Dose Route Frequency Provider Last Rate    acetaminophen   975 mg Oral Daily Cristian Dias MD      And    acetaminophen  650 mg Oral Daily Cristian Dias MD      And    acetaminophen  975 mg Oral Daily Cristian Dias MD      And    acetaminophen  650 mg Oral Daily Cristian Dias MD      aluminum-magnesium hydroxide-simethicone  30 mL Oral Q4H PRN Claudine Leos PA-C      amLODIPine  5 mg Oral Daily Claudine Leos PA-C      aspirin  81 mg Oral Daily Claudine Leos PA-C      atorvastatin  40 mg Oral Daily With Dinner Claudine Leos PA-C      vitamin B-12  1,000 mcg Oral Daily Claudine Leos PA-C      dexamethasone  3 mg Oral BID Claudine Leos PA-C      Diclofenac Sodium  2 g Topical 4x Daily Claudine Leos PA-C      enoxaparin  40 mg Subcutaneous Q24H Claudine Leos PA-C      gabapentin  200 mg Oral Daily Claudine Leos PA-C      And    gabapentin  300 mg Oral HS Claudine Leos PA-C      levETIRAcetam  1,000 mg Oral BID Claudine Leos PA-C      levothyroxine  50 mcg Oral Early Morning Claudine Leos PA-C      lidocaine  1 patch Topical Daily Claudine Leos PA-C      melatonin  3 mg Oral HS Claudine Leos PA-C      methocarbamol  250 mg Oral Q6H PRN Claudine Leos PA-C      pantoprazole  40 mg Oral Daily Before Breakfast Claudine Leos PA-C      QUEtiapine  25 mg Oral HS Cristian Dias MD      senna  1 tablet Oral Daily Claudine Leos PA-C      sulfamethoxazole-trimethoprim  1 tablet Oral Once per day on Monday Wednesday Friday Claudine Leos PA-C          M*Modal software was used to dictate this note.  It may contain errors with dictating incorrect words or incorrect spelling. Please contact the provider directly with any questions.

## 2025-05-19 NOTE — PLAN OF CARE
Problem: PAIN - ADULT  Goal: Verbalizes/displays adequate comfort level or baseline comfort level  Description: Interventions:  - Encourage patient to monitor pain and request assistance  - Assess pain using appropriate pain scale  - Administer analgesics as ordered based on type and severity of pain and evaluate response  - Implement non-pharmacological measures as appropriate and evaluate response  - Consider cultural and social influences on pain and pain management  - Notify physician/advanced practitioner if interventions unsuccessful or patient reports new pain  - Educate patient/family on pain management process including their role and importance of  reporting pain   - Provide non-pharmacologic/complimentary pain relief interventions  Outcome: Progressing     Problem: SAFETY ADULT  Goal: Patient will remain free of falls  Description: INTERVENTIONS:  - Educate patient/family on patient safety including physical limitations  - Instruct patient to call for assistance with activity   - Keep Call bell within reach  - Keep bed low and locked with side rails adjusted as appropriate  - Keep care items and personal belongings within reach  - Initiate and maintain comfort rounds  - Make Fall Risk Sign visible to staff  - Offer Toileting every 2 Hours, in advance of need  - Initiate/Maintain bed/chair alarm  - Obtain necessary fall risk management equipment: bed/chair sensor  - Apply yellow socks and bracelet for high fall risk patients  - Consider moving patient to room near nurses station  Outcome: Progressing

## 2025-05-19 NOTE — ASSESSMENT & PLAN NOTE
Recently within 10-15 range   Likely steroid induced   Continue to monitor CBC and signs/symptoms of infection

## 2025-05-19 NOTE — ASSESSMENT & PLAN NOTE
Continue home amlodipine 5mg daily.  Increase amlodipine to 10mg daily today.  Monitor BP with routine VS.

## 2025-05-19 NOTE — PROGRESS NOTES
05/19/25 1230   Pain Assessment   Pain Assessment Tool 0-10   Pain Score No Pain   Restrictions/Precautions   Precautions Bed/chair alarms;Fall Risk;Cognitive;Supervision on toilet/commode;Visual deficit   Subjective   Subjective reports she would lie in bed for breaks due to her back hurting, no pain during therapy   Lying to Sitting on Side of Bed   Type of Assistance Needed Incidental touching   Lying to Sitting on Side of Bed CARE Score 4   Sit to Stand   Type of Assistance Needed Physical assistance   Physical Assistance Level 25% or less   Sit to Stand CARE Score 3   Bed-Chair Transfer   Type of Assistance Needed Physical assistance   Physical Assistance Level 25% or less   Chair/Bed-to-Chair Transfer CARE Score 3   Transfer Bed/Chair/Wheelchair   Stand Pivot Minimal Assist  (practicd repeated SPT wit R UE support only, no device going to left and right chair to chair x 5)   Walk 10 Feet   Type of Assistance Needed Physical assistance   Physical Assistance Level 26%-50%   Comment R HHA, left lean with poor wt shift to the right and left foot drag, fatigues easily and becomes anxious   Walk 10 Feet CARE Score 3   Walk 50 Feet with Two Turns   Type of Assistance Needed Physical assistance   Physical Assistance Level 26%-50%   Walk 50 Feet with Two Turns CARE Score 3   Walk 150 Feet   Reason if not Attempted Safety concerns   Walk 150 Feet CARE Score 88   Therapeutic Interventions   Flexibility Passive seated stretch B HS & calves   Neuromuscular Re-Education walking along HR in [place], attempte dbkwd walking with R HR but pt c/o dizziness with head down entire time. Used HR and HHA walking up and back turning around to go fwd each time. Completed 4 bouts with seated breaks.   Equipment Use   [employer]rees[employer] 2 x 1 min, therapist assist with LUE   Assessment   Treatment Assessment Pt continues to have left sided weakness and balance defiicits affecting her ability to walk. Ambulated best along HR in [place] vs HHA. Poor  wt shift with HHA, fatigues easily and becomes fearful of falling. Poor righting recations at this time to correct to midline with LOB to left. Cedrick benefit form ongoing neuro re-ed to improve safety and decrease risk of falls.   PT Therapy Minutes   PT Time In 1230   PT Time Out 1330   PT Total Time (minutes) 60   PT Mode of treatment - Individual (minutes) 60   PT Mode of treatment - Concurrent (minutes) 0   PT Mode of treatment - Group (minutes) 0   PT Mode of treatment - Co-treat (minutes) 0   PT Mode of Treatment - Total time(minutes) 60 minutes   PT Cumulative Minutes 210   Therapy Time missed   Time missed? No

## 2025-05-19 NOTE — ASSESSMENT & PLAN NOTE
WBC count currently 15.97.  No active s/s of infection.  Likely steroid induced.  Continue to trend routine CBC.

## 2025-05-19 NOTE — PROGRESS NOTES
05/19/25 0900   Pain Assessment   Pain Assessment Tool 0-10   Pain Score No Pain   Restrictions/Precautions   Precautions Bed/chair alarms;Cognitive;Fall Risk;Pain;Supervision on toilet/commode;Visual deficit   Weight Bearing Restrictions No   ROM Restrictions No   Sit to Lying   Type of Assistance Needed Incidental touching   Physical Assistance Level No physical assistance   Sit to Lying CARE Score 4   Lying to Sitting on Side of Bed   Type of Assistance Needed Incidental touching   Physical Assistance Level No physical assistance   Lying to Sitting on Side of Bed CARE Score 4   Sit to Stand   Type of Assistance Needed Physical assistance   Physical Assistance Level 25% or less   Comment Min A throughout OT session- pt reports mild fatigue throughout OT session   Sit to Stand CARE Score 3   Bed-Chair Transfer   Type of Assistance Needed Physical assistance   Physical Assistance Level 25% or less   Comment Min A without AD   Chair/Bed-to-Chair Transfer CARE Score 3   Toileting Hygiene   Type of Assistance Needed Physical assistance   Physical Assistance Level 25% or less   Comment Min A for balance during perineal hygiene post toileting, Min A for clothing management tasks.   Toileting Hygiene CARE Score 3   Toilet Transfer   Type of Assistance Needed Physical assistance   Physical Assistance Level 25% or less   Comment Min A HHA R hand; verbal cues/guiding for LUE safety/placement;   Toilet Transfer CARE Score 3   Toilet Transfer   Surface Assessed   (BSC over toilet)   Neuromuscular Education   Weight Bearing Technique Yes   Cognition   Overall Cognitive Status WFL   Arousal/Participation Alert;Cooperative   Attention Attends with cues to redirect   Orientation Level Oriented X4   Memory Decreased short term memory;Decreased recall of recent events;Decreased recall of precautions   Following Commands Follows one step commands with increased time or repetition   Comments LUE inattention noted throughout OT  session with verbal cues needed to attend to LUE.   Activity Tolerance   Activity Tolerance Patient tolerated treatment well   Assessment   Treatment Assessment Pt participated in skilled OT session for 90min with fair activity tolerance with focus on ADL training and NM re-edu. Pt seated in bedside recliner at start of therapy session. Pt agreeable to OT session. Pt's daughter present throughout OT session. Pt participated in SPT from bedside recliner to w/c at Min A level with HHA. Pt transported to therapy gym in w/ by therapist. Pt participated in seated table top LUE NM re-education activities. Pt participated in bilateral UE towel glide exercises with focus on LUE involvment [id number] in following movements: forward/backwards, side to side, and circles. Pt required hand over hand assistance for LUE control during side to side movement and circles with decreased ability to keep LUE on towel during movement. In addition LUE support needed at elbow during exercises for patterned movements. Pt participated in grasp/release activity with towel bilateral UEs with focus on LUE hand [id number] with pt noted to have poor extension of L digits with increased timeliness and focus needed on LUE hand to complete activity. Pt participated in faciliated pattern movement with picking up items on left, transferring items at midline to RUE hand and handing to daughter on R. Pt able to complete this [id number] with bean bags, and [id number] w/ varying size balls w/ pt noted to require verbal cues for motor control to sequence movement. Pt noted to fatigue w/ decreased coordination noted. Pt participated in standing balance activity w/ focus on weight bearing through LUE on half bolster while pt reached w/ RUE. Pt participated in sit to stand/stand to sit transfers at Min A level w/ vcs for hand placement/technique. Pt stood for 3min 1st time, 2.5min 2nd time, and 2.7min 3rd time with no LOB noted however fatigue noted in LLE upon last  stand. Pt particpated in LUE weight bearing on extended arm throughout standing activity with support needed at pt forearm and elbow. Pt requested to utilize bathroom at end of therapy session. See above note for further details. Pt requesting back to bed at end of therapy session. Pt transferred from w/c to bed at Min A level with HHA. Pt at end of therapy session seated supported in bed with bed alarm on, all needed items in reach, no further OT needs, and daughter present. Pt would benefit from continued OT services to progress pt with LUE NM re-edu, standing balance/tolerance, ADL training, energy conservation training, safety training, DME training, and pt/family caregiver edu/d/c planning edu.  (Continue with established POC at this time.)   Problem List Decreased strength;Decreased endurance;Impaired balance;Decreased mobility;Decreased coordination;Decreased safety awareness;Decreased range of motion   Barriers to Discharge Inaccessible home environment;Decreased caregiver support   Plan   Treatment/Interventions ADL retraining;Functional transfer training;Therapeutic exercise;Endurance training;Patient/family training;Equipment eval/education;Bed mobility;Compensatory technique education   OT Therapy Minutes   OT Time In 0900   OT Time Out 1030   OT Total Time (minutes) 90   OT Mode of treatment - Individual (minutes) 90   OT Mode of treatment - Concurrent (minutes) 0   OT Mode of treatment - Group (minutes) 0   OT Mode of treatment - Co-treat (minutes) 0   OT Mode of Treatment - Total time(minutes) 90 minutes   OT Cumulative Minutes 240   Therapy Time missed   Time missed? No

## 2025-05-19 NOTE — ASSESSMENT & PLAN NOTE
Hgb currently stable at 10.3.  Baseline 9-10.  Continue home cyanocobalamin 1000mcg daily.  Asymptomatic.  Continue to trend routine CBC.

## 2025-05-19 NOTE — PROGRESS NOTES
Progress Note - PMR   Name: Ayla Nye 74 y.o. 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 Treatments complicated by toxicities and infection.   Most recently receiving Taoxtere with plans to start chemotherapy after discharge   MRI brain showed stable enhancing lesions within the L superior occipital lobe and L parietal lobe with surrounding vasogenic edema, stable foci of restricted diffusion within the R posterior frontal lobe most likely sequela of subacute ischemia.  CT CAP showed multiple lesions with increase in size.    Plan:   Continue increased dose of dexamethasone 3mg BID.  Continue Bactrim -160mg MWF  Continue Keppra 1000mg BID  See pain insertion   Follows with Dr. Castro (Oncology), Dr Vuong (Radiation/Oncology), and Palliative as outpatient  Monitor for new or worsening pain, decreased ROM,  changes in strength, sensation, balance, and mentation,increased fatigue, SOB, edema, abdominal pain, nausea, vomiting, constipation, wt loss, worsening intake/appetite   HTN (hypertension)  Continue home amlodipine 5mg daily  Monitor BP trends   Insomnia  Continue melatonin 3mg qHS  Encourage sleep hygiene (routine that promotes healthy circadian rhythm,avoid caffeine later in the day, quiet/dark room at night, reduce electronic before bedtime)  Hypothyroidism  Continue home levothyroxine 50mcg daily  Cancer associated pain  Current pain regimen: Scheduled Tylenol, gabapentin daily and qHS,, Lidoderm, and PRN Robaxin   Continue to monitor pain levels and adjust as needed   Anemia  Hgb currently stable at 10.3.  Baseline 9-10.  Continue home cyanocobalamin 1000mcg daily.  Continue to monitor CBC  Leukocytosis  Recently within 10-15 range   Likely steroid induced    Continue to monitor CBC and signs/symptoms of infection   History of pulmonary embolism  Most recent CTA chest in March 2025 negative for PE   Previously on Xarelto but discontinued due to new foci of hemorrhage within the L occipital metastasis (April 2025)   Monitor respiratory status   History of Pneumocystis jirovecii pneumonia  Continue Bactrim 3x weekly   GERD (gastroesophageal reflux disease)  Continue PTA Protonix 40mg daily  History of stroke  Right frontal subacute infarct diagnosed on 04/2025  Residual left hemiparesis   Continue ASA 81mg daily and Lipitor 40mg daily.  Continue with PT/OT  Follow-up with Neurovascular in 4-6 weeks as outpatient.  Fall  Presented on 5/12/25 after experiencing mechanical fall with head strike.  Hx of L sided weakness s/p stroke in 04/2025.  CTH showed vasogenic edema L parietal vertex compatible with known brain metastasis, no hemorrhage identified.  MRI brain showed stable enhancing lesions within the L superior occipital lobe and L parietal lobe with surrounding vasogenic edema, stable foci of restricted diffusion within the R posterior frontal lobe most likely sequela of subacute ischemia.  Per Neurology, symptoms likely due to some increase in size of CNS metastasis on top of noted vasogenic edema.  Home dexamethasone increased from 2mg BID to 3mg BID  Impaired mobility and ADLs  Patient was evaluated by the rehabilitation team MD and an appropriate candidate for acute inpatient rehabilitation program at this time.  The patient will tolerate 3 hours/day 5 to 7 days/week of intensive physical, speech and occupational therapy   in order to obtain goals for community discharge  Due to the patient's functional Compared to their baseline level of function in addition to their ongoing medical needs, the patient would benefit from daily supervision from a rehabilitation physician as well as rehabilitation nursing to implement and adjust the medical as well as functional plan  of care in order to meet the patient's goals.  Bladder: Monitor closely for urinary incontinence and/or retention. Monitor urine output and encourage spontaneous voids. If unable to void spontaneously, will monitor with PVR bladder scans and initiate CIC regimen.  Skin: Monitor for breakdown, frequent turns, pressure offloading  Bowel: Monitor closely for constipation and/or incontinence. Will follow BMs and adjust bowel regimen to target soft, formed stools q1-2 days, per pt's regular schedule  Discharge date TBD, pending interdisciplinary meeting     At risk for venous thromboembolism (VTE)  Continue Lovenox while inpatient     Subjective   Patient is a 74 year-old female with a past medical history of stage IV adenocarcinoma of the lung metastatic to brain, history of ischemic stroke in April 2025, GERD, hyperlipidemia, hypertension, history of pulmonary embolism, presenting to [facility] due to a progressive cancer metastases, recent fall, recent stroke and debility. She was admitted from 5/5 to 5/7 due to left-sided hemiparesis as a result of right MCA territory frontal lobe stroke. Xarelto was discontinued in April 2025 after new foci of hemorrhage within the left occipital metastasis She presented to the ED again on 5/12 after a fall with headstrike. Imaging did not show an acute traumatic injury, though did show progression of metastases. Palliative care, neurology, and heme onc consulted. She was evaluated by PT and OT and deemed an appropiate candidate for [facility] due to functional deficits.     Chief Complaint: f/u ambulatory dysfunction and increased weakness    Interval:   Seen and evaluated patient in room. She denies any acute concerns, is having mild back pain. Pain improved with adjusted Tylenol schedule. She slept better last night with increased dose of quetiapine. Last BM 5/18, continent of bowel and bladder.   5/19 labs reviewed, WBC and hgb stable.     Objective :  Temp:  [97.9 °F (36.6 °C)-98.4 °F (36.9  °C)] 97.9 °F (36.6 °C)  HR:  [81-86] 81  BP: (139-152)/(71-99) 139/71  Resp:  [16-18] 18  SpO2:  [92 %-93 %] 93 %  O2 Device: None (Room air)    Functional Update:  Physical Therapy Occupational Therapy Speech Therapy      Mod assist for bed mobility  Mod assist for sit to stand  Min assist for ambulation    Set-up for eating  Min assist for oral hygiene  Mod assist for bathing  Max assist for UB dressing  Mod to max assist for LB dressing  Mod assist for toilet hygiene  Mod assist for toilet transfer     FIM 4 - comprehension  FIM 4 - expression  FIM 6 - social interaction  FIM 3 - problem solving  FIM 3 - memory  CLQT+ 3.4 out of 4.0, mildly impaired            Physical Exam  Vitals and nursing note reviewed.   Constitutional:       General: She is not in acute distress.     Appearance: She is not toxic-appearing or diaphoretic.      Comments: Appears comfortable resting in bed    HENT:      Head: Normocephalic and atraumatic.      Mouth/Throat:      Mouth: Mucous membranes are moist.   Pulmonary:      Effort: Pulmonary effort is normal. No respiratory distress.     Neurological:      Mental Status: She is alert.      Comments: Alert and appropriate to questions    Psychiatric:         Mood and Affect: Mood normal.         Behavior: Behavior normal.         Scheduled Meds:  Current Facility-Administered Medications   Medication Dose Route Frequency Provider Last Rate    acetaminophen  975 mg Oral Daily Cristian Dias MD      And    acetaminophen  650 mg Oral Daily Cristian Dias MD      And    acetaminophen  975 mg Oral Daily Cristian Dias MD      And    acetaminophen  650 mg Oral Daily Cristian Dias MD      aluminum-magnesium hydroxide-simethicone  30 mL Oral Q4H PRN Claudine Leos PA-C      [START ON 5/20/2025] amLODIPine  10 mg Oral Daily ZULEYMA Viramontes      aspirin  81 mg Oral Daily Claudine Leos PA-C      atorvastatin  40 mg Oral Daily With Dinner Claudine Leos PA-C      vitamin  B-12  1,000 mcg Oral Daily Claudine Leos PA-C      dexamethasone  3 mg Oral BID Claudine Leos PA-C      Diclofenac Sodium  2 g Topical 4x Daily Claudine Leos PA-C      enoxaparin  40 mg Subcutaneous Q24H Claudine Leos PA-C      gabapentin  200 mg Oral Daily Claudine Leos PA-C      And    gabapentin  300 mg Oral HS Claudine Leos PA-C      levETIRAcetam  1,000 mg Oral BID Claudine Leos PA-C      levothyroxine  50 mcg Oral Early Morning Claudine Leos PA-C      lidocaine  1 patch Topical Daily Claudine Leos PA-C      melatonin  3 mg Oral HS Claudine Leos PA-C      methocarbamol  250 mg Oral Q6H PRN Claudine Leos PA-C      pantoprazole  40 mg Oral Daily Before Breakfast Claudine Leos PA-C      QUEtiapine  25 mg Oral HS Cristian Dias MD      senna  1 tablet Oral Daily Claudine Leos PA-C      sulfamethoxazole-trimethoprim  1 tablet Oral Once per day on Monday Wednesday Friday Claudine Leos PA-C           Lab Results: I have reviewed the following results:  Results from last 7 days   Lab Units 05/19/25  0523 05/16/25  0537 05/15/25  0510   HEMOGLOBIN g/dL 10.3* 10.1* 9.4*   HEMATOCRIT % 34.1* 34.0* 31.2*   WBC Thousand/uL 15.97* 14.84* 14.75*   PLATELETS Thousands/uL 315 333 318     Results from last 7 days   Lab Units 05/19/25  0523 05/16/25  0537 05/13/25  0436 05/12/25  1423   BUN mg/dL 20 25 24 23   SODIUM mmol/L 140 140 139 141   POTASSIUM mmol/L 4.7 4.2 4.5 4.3   CHLORIDE mmol/L 104 104 106 104   CREATININE mg/dL 0.82 0.86 0.88 1.00   AST U/L  --  11*  --  9*   ALT U/L  --  13  --  10            Claudine Leos PA-C  Physical Medicine and Rehabilitation  [facility]

## 2025-05-19 NOTE — ASSESSMENT & PLAN NOTE
Hgb currently stable at 10.3.  Baseline 9-10.  Continue home cyanocobalamin 1000mcg daily.  Continue to monitor CBC

## 2025-05-19 NOTE — PROGRESS NOTES
05/19/25 5085   Pain Assessment   Pain Assessment Tool 0-10   Pain Score No Pain   Restrictions/Precautions   Precautions Bed/chair alarms;Cognitive;Fall Risk;Pain;Supervision on toilet/commode;Visual deficit   Comprehension   Comprehension (FIM) 5 - Understands basic directions and conversation   Expression   Expression (FIM) 5 - Needs help/cues only RARELY (< 10% of the time)   Social Interaction   Social Interaction (FIM) 6 - Interacts appropriately with others BUT requires extra  time   Problem Solving   Problem solving (FIM) 4 - Solves basic problems 75-89% of time   Memory   Memory (FIM) 3 - Recognizes, recalls/performs 50-74%   Speech/Language/Cognition Assessment   Treatment Assessment Pt participated in skilled ST session focusing on cognitive linguistic skills. As this SLP is new to pt's care, engaged rapport building which also focused on review of PLOF and biographical info. Pt accurately recalled all LTM biographical info based on chart review. Pt reviewed that she lives alone with the help of a HHA. Confirmed that she was independent for finances, managing planning/scheduling of appts and cooking/meal prep (mainly microwave meals). Of note, pt's ex- Sandro also was present for most of session and confirmed all info, as well as that he assists with taking her to her appts. Pt's HHA assists with managing pt's medications, as per pt.     Regarding managing appts, pt reported that she primarily uses 1-4 Allt to log on weekly and to track appts. Sandro reported that he also keeps track of pt's appts by writing them down to keep at his home. Pt did also relay that she has recently had difficulty with accessing MyChart since the update occurred on 5/15. She reported that she will eventually call the assistance number. SLP offered to assist her in this. For now, will focus on other areas in therapy BUT ST team to make sure that pt regains access to MyChart prior to discharge. For finance management, pt  reported that most of her bills are managed online with some set up on [employer] and some that she received paper statements for, which she pays as soon as she receives them. She stated that she did recently have some difficulty since admission due to not remembering the passwords, however, Sandro brought in pt's password book with him today to assist. Pt relayed that she uses minimal checks to pay bills and when she does need to write a check that either Sandro or her daughters will assist her. Presented with a mock credit card bill to target reading/listening comprehension, as well as visual scanning, pt was verbally presented with comprehension questions. Pt accurately provided verbal responses with 9/10 accuracy. In one trial, pt was first provided with verbal cues to locate a response, however, this response was present on the far L side, in the middle of the page. Pt ultimately required visual cues to improve scanning and locating response. Lastly, targeting sequencing skills and visual scanning, pt engaged in a written sequencing task, given Fo3 and Fo4 words. Pt accurately sequenced all words based on word order/progression of degree in 12/15 trials. Pt required only min verbal cues in which she mainly only needed to review errored responses to which she then corrected trials to achieve 15/15 accuracy with task. At this time, pt is recommended for and will continue to benefit from skilled SLP services targeting functional cognitive linguistic skills in order to maximize independence and reducing caregiver burden on discharge.    SLP Therapy Minutes   SLP Time In 1355   SLP Time Out 1425   SLP Total Time (minutes) 30   SLP Mode of treatment - Individual (minutes) 30   SLP Mode of treatment - Concurrent (minutes) 0   SLP Mode of treatment - Group (minutes) 0   SLP Mode of treatment - Co-treat (minutes) 0   SLP Mode of Treatment - Total time(minutes) 30 minutes   SLP Cumulative Minutes 120   Therapy Time missed    Time missed? No

## 2025-05-20 PROCEDURE — 97130 THER IVNTJ EA ADDL 15 MIN: CPT

## 2025-05-20 PROCEDURE — 97535 SELF CARE MNGMENT TRAINING: CPT

## 2025-05-20 PROCEDURE — 97112 NEUROMUSCULAR REEDUCATION: CPT

## 2025-05-20 PROCEDURE — 99232 SBSQ HOSP IP/OBS MODERATE 35: CPT | Performed by: PHYSICAL MEDICINE & REHABILITATION

## 2025-05-20 PROCEDURE — 99232 SBSQ HOSP IP/OBS MODERATE 35: CPT | Performed by: INTERNAL MEDICINE

## 2025-05-20 PROCEDURE — 97116 GAIT TRAINING THERAPY: CPT

## 2025-05-20 PROCEDURE — 97110 THERAPEUTIC EXERCISES: CPT

## 2025-05-20 PROCEDURE — 97129 THER IVNTJ 1ST 15 MIN: CPT

## 2025-05-20 RX ORDER — GABAPENTIN 400 MG/1
400 CAPSULE ORAL
Status: DISCONTINUED | OUTPATIENT
Start: 2025-05-20 | End: 2025-05-30 | Stop reason: HOSPADM

## 2025-05-20 RX ORDER — GABAPENTIN 100 MG/1
200 CAPSULE ORAL DAILY
Status: DISCONTINUED | OUTPATIENT
Start: 2025-05-21 | End: 2025-05-30 | Stop reason: HOSPADM

## 2025-05-20 RX ADMIN — ALUMINUM HYDROXIDE, MAGNESIUM HYDROXIDE, AND DIMETHICONE 30 ML: 200; 20; 200 SUSPENSION ORAL at 23:04

## 2025-05-20 RX ADMIN — LEVOTHYROXINE SODIUM 50 MCG: 0.05 TABLET ORAL at 06:32

## 2025-05-20 RX ADMIN — ATORVASTATIN CALCIUM 40 MG: 40 TABLET, FILM COATED ORAL at 17:11

## 2025-05-20 RX ADMIN — ASPIRIN 81 MG CHEWABLE TABLET 81 MG: 81 TABLET CHEWABLE at 09:08

## 2025-05-20 RX ADMIN — ACETAMINOPHEN 650 MG: 325 TABLET, FILM COATED ORAL at 11:47

## 2025-05-20 RX ADMIN — LEVETIRACETAM 1000 MG: 500 TABLET, FILM COATED ORAL at 09:06

## 2025-05-20 RX ADMIN — LIDOCAINE 5% 1 PATCH: 700 PATCH TOPICAL at 09:15

## 2025-05-20 RX ADMIN — DEXAMETHASONE 3 MG: 0.5 TABLET ORAL at 09:10

## 2025-05-20 RX ADMIN — DEXAMETHASONE 3 MG: 0.5 TABLET ORAL at 21:18

## 2025-05-20 RX ADMIN — ACETAMINOPHEN 650 MG: 325 TABLET, FILM COATED ORAL at 23:04

## 2025-05-20 RX ADMIN — METHOCARBAMOL TABLETS 250 MG: 500 TABLET, COATED ORAL at 21:21

## 2025-05-20 RX ADMIN — GABAPENTIN 400 MG: 400 CAPSULE ORAL at 21:17

## 2025-05-20 RX ADMIN — DICLOFENAC SODIUM 2 G: 10 GEL TOPICAL at 21:16

## 2025-05-20 RX ADMIN — GABAPENTIN 200 MG: 100 CAPSULE ORAL at 09:06

## 2025-05-20 RX ADMIN — ENOXAPARIN SODIUM 40 MG: 40 INJECTION SUBCUTANEOUS at 14:04

## 2025-05-20 RX ADMIN — ACETAMINOPHEN 975 MG: 325 TABLET, FILM COATED ORAL at 17:20

## 2025-05-20 RX ADMIN — METHOCARBAMOL TABLETS 250 MG: 500 TABLET, COATED ORAL at 10:29

## 2025-05-20 RX ADMIN — Medication 3 MG: at 21:17

## 2025-05-20 RX ADMIN — QUETIAPINE FUMARATE 25 MG: 25 TABLET ORAL at 21:16

## 2025-05-20 RX ADMIN — LEVETIRACETAM 1000 MG: 500 TABLET, FILM COATED ORAL at 21:17

## 2025-05-20 RX ADMIN — PANTOPRAZOLE SODIUM 40 MG: 40 TABLET, DELAYED RELEASE ORAL at 06:32

## 2025-05-20 RX ADMIN — AMLODIPINE BESYLATE 10 MG: 10 TABLET ORAL at 09:08

## 2025-05-20 RX ADMIN — DICLOFENAC SODIUM 2 G: 10 GEL TOPICAL at 09:15

## 2025-05-20 RX ADMIN — ACETAMINOPHEN 975 MG: 325 TABLET, FILM COATED ORAL at 06:32

## 2025-05-20 RX ADMIN — CYANOCOBALAMIN TAB 1000 MCG 1000 MCG: 1000 TAB at 09:08

## 2025-05-20 NOTE — PROGRESS NOTES
Progress Note - Internal Medicine   Name: Ayla Nye 74 y.o. 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  Hx of radiation in 08/2024 and 01/2025.  Hx of hold on treatments due to multiple admission/toxicities/PCP pneumonia/PE.  Most recently receiving Taoxtere with plans to start chemotherapy after admission.    MRI brain showed stable enhancing lesions within the L superior occipital lobe and L parietal lobe with surrounding vasogenic edema, stable foci of restricted diffusion within the R posterior frontal lobe most likely sequela of subacute ischemia.  CT CAP showed multiple lesions with increase in size.    Continue increased dose of dexamethasone 3mg BID.  Continue Bactrim -160mg MWF.  Continue Keppra 1000mg BID.    Follows with Dr. Castro (Oncology), Dr Vuong (Radiation/Oncology), and Palliative as outpatient.  Follow-up with Neurology in 4-6 weeks as outpatient.  HTN (hypertension)  Continue home amlodipine 5mg daily.  Increase amlodipine to 10mg daily today.  Monitor BP with routine VS.  Insomnia  Continue melatonin 3mg and Seroquel 25mg at HS.  Hypothyroidism  Continue home levothyroxine 50mcg daily.  TSH 0.408 and T4 0.79 on 5/12.  Recommend repeat TSH in 6 weeks as outpatient.  Anemia  Hgb currently stable at 10.3.  Baseline 9-10.  Continue home cyanocobalamin 1000mcg daily.  Asymptomatic.  Continue to trend routine CBC.  Leukocytosis  WBC count currently 15.97.  No active s/s of infection.  Likely steroid induced.  Continue to trend routine CBC.  History of pulmonary embolism  Most recent CTA chest/PE study in 03/2025 showed no PE.  Had been on Xarelto in the past but discontinued due to new foci of hemorrhage within the L occipital metastasis.  Monitor for s/s of reoccurrence.  GERD (gastroesophageal reflux  disease)  Continue home Protonix 40mg daily.  Stroke-like symptoms  Presented on 25 after experiencing mechanical fall with head strike.  Hx of L sided weakness s/p stroke in 2025.  CTH showed vasogenic edema L parietal vertex compatible with known brain metastasis, no hemorrhage identified.  MRI brain showed stable enhancing lesions within the L superior occipital lobe and L parietal lobe with surrounding vasogenic edema, stable foci of restricted diffusion within the R posterior frontal lobe most likely sequela of subacute ischemia.    Per Neurology - symptoms likely due to some increase in size of CNS metastasis on top of noted vasogenic edema.  Home dexamethasone increased from 2mg BID to 3mg BID.    Neurovascular checks Q shift.  Follow-up with Neurology in 4-6 weeks as outpatient.    Primary team following.  PT/OT/SLP.  History of stroke  R frontal subacute infarct in 2025.  Continue ASA 81mg daily and Lipitor 40mg daily.  Continue with PT/OT.  Follow-up with Neurovascular in 4-6 weeks as outpatient.    VTE Pharmacologic Prophylaxis:   Pharmacologic: Enoxaparin (Lovenox)  Mechanical VTE Prophylaxis in Place: Yes - sequential compression devices.    Current Length of Stay: 5 day(s)    Current Patient Status: Inpatient Rehab     Discharge Plan: As per primary team.    Code Status: Level 3 - DNAR and DNI    Subjective:   Pt examined while pt lying in bed in pt room.  Currently has complaints of lower back pain, moderate, chronic, improving with heating pad.  Denies any L shoulder pain currently.  States that her back pain did improve last night while using the heating pad as well.  Denies any headaches, lightheadedness, dizziness, or SOB.  Has been having difficulty sleeping - states that she sleeps about 3 hours a day.  Currently has no other concerns or complaints.    Objective:     Vitals:   Temp (24hrs), Av.1 °F (36.7 °C), Min:97.6 °F (36.4 °C), Max:98.4 °F (36.9 °C)    Temp:  [97.6 °F (36.4  °C)-98.4 °F (36.9 °C)] 97.6 °F (36.4 °C)  HR:  [80-86] 84  Resp:  [18] 18  BP: (129-155)/(79-98) 135/82  SpO2:  [93 %-94 %] 93 %  Body mass index is 21.41 kg/m².     Review of Systems   Constitutional:  Positive for fatigue. Negative for appetite change, chills and fever.   HENT:  Negative for trouble swallowing.    Eyes:  Negative for visual disturbance.   Respiratory:  Negative for cough, shortness of breath, wheezing and stridor.    Cardiovascular:  Negative for chest pain, palpitations and leg swelling.   Gastrointestinal:  Negative for abdominal distention, abdominal pain, constipation, diarrhea, nausea and vomiting.        LBM 5/20   Genitourinary:  Negative for difficulty urinating.   Musculoskeletal:  Positive for neck pain (posterior neck pain, moderate, 5/10, improves with heating pad). Negative for arthralgias, back pain and gait problem.   Neurological:  Positive for weakness (LUE weakness). Negative for dizziness, light-headedness and headaches.   Psychiatric/Behavioral:  Negative for dysphoric mood and sleep disturbance. The patient is not nervous/anxious.    All other systems reviewed and are negative.       Input and Output Summary (last 24 hours):       Intake/Output Summary (Last 24 hours) at 5/20/2025 0912  Last data filed at 5/20/2025 0758  Gross per 24 hour   Intake 470 ml   Output 500 ml   Net -30 ml       Physical Exam:     Physical Exam  Vitals and nursing note reviewed.   Constitutional:       General: She is not in acute distress.     Appearance: Normal appearance. She is not ill-appearing.   HENT:      Head: Normocephalic and atraumatic.     Cardiovascular:      Rate and Rhythm: Normal rate and regular rhythm.      Pulses: Normal pulses.      Heart sounds: Normal heart sounds. No murmur heard.     No friction rub.   Pulmonary:      Effort: Pulmonary effort is normal. No respiratory distress.      Breath sounds: Normal breath sounds. No wheezing or rhonchi.   Abdominal:      General:  Abdomen is flat. Bowel sounds are normal. There is no distension.      Palpations: Abdomen is soft. There is no mass.      Tenderness: There is no abdominal tenderness. There is no guarding or rebound.      Hernia: No hernia is present.     Musculoskeletal:      Cervical back: Normal range of motion and neck supple.      Right lower leg: No edema.      Left lower leg: No edema.     Skin:     General: Skin is warm and dry.      Capillary Refill: Capillary refill takes less than 2 seconds.     Neurological:      Mental Status: She is alert and oriented to person, place, and time.      Motor: Weakness (LUE strength 4/5) present.     Psychiatric:         Mood and Affect: Mood normal.         Behavior: Behavior normal.         Additional Data:     Labs:    Results from last 7 days   Lab Units 05/19/25  0523   WBC Thousand/uL 15.97*   HEMOGLOBIN g/dL 10.3*   HEMATOCRIT % 34.1*   PLATELETS Thousands/uL 315   BANDS PCT % 2   LYMPHO PCT % 4*   MONO PCT % 4   EOS PCT % 0     Results from last 7 days   Lab Units 05/19/25  0523 05/16/25  0537   SODIUM mmol/L 140 140   POTASSIUM mmol/L 4.7 4.2   CHLORIDE mmol/L 104 104   CO2 mmol/L 26 26   BUN mg/dL 20 25   CREATININE mg/dL 0.82 0.86   ANION GAP mmol/L 10 10   CALCIUM mg/dL 9.1 9.0   ALBUMIN g/dL  --  3.8   TOTAL BILIRUBIN mg/dL  --  0.23   ALK PHOS U/L  --  53   ALT U/L  --  13   AST U/L  --  11*   GLUCOSE RANDOM mg/dL 111 117         Results from last 7 days   Lab Units 05/14/25  1116 05/14/25  0744 05/13/25  2140 05/13/25  1544   POC GLUCOSE mg/dl 163* 95 157* 177*               Labs reviewed    Imaging:    Imaging reviewed    Recent Cultures (last 7 days):           Last 24 Hours Medication List:   Current Facility-Administered Medications   Medication Dose Route Frequency Provider Last Rate    acetaminophen  975 mg Oral Daily Cristian Dias MD      And    acetaminophen  650 mg Oral Daily Cristian Dias MD      And    acetaminophen  975 mg Oral Daily Cristian Dias MD       And    acetaminophen  650 mg Oral Daily Cristian Dias MD      aluminum-magnesium hydroxide-simethicone  30 mL Oral Q4H PRN Claudine Leos PA-C      amLODIPine  10 mg Oral Daily ZULEYMA Viramontes      aspirin  81 mg Oral Daily Claudine Leos PA-C      atorvastatin  40 mg Oral Daily With Dinner Claudine Leos PA-C      vitamin B-12  1,000 mcg Oral Daily Claudine Leos PA-C      dexamethasone  3 mg Oral BID Claudine Leos PA-C      Diclofenac Sodium  2 g Topical 4x Daily Claudine Leos PA-C      enoxaparin  40 mg Subcutaneous Q24H Claudine Leos PA-C      gabapentin  200 mg Oral Daily Claudine Leos PA-C      And    gabapentin  300 mg Oral HS Claudine Leos PA-C      levETIRAcetam  1,000 mg Oral BID Claudine Leos PA-C      levothyroxine  50 mcg Oral Early Morning Claudine Leos PA-C      lidocaine  1 patch Topical Daily Claudine Leos PA-C      melatonin  3 mg Oral HS Claudine Leos PA-C      methocarbamol  250 mg Oral Q6H PRN Claudine Leos PA-C      pantoprazole  40 mg Oral Daily Before Breakfast Claudine Leos PA-C      QUEtiapine  25 mg Oral HS Cristian Dias MD      senna  1 tablet Oral Daily Claudine Leos PA-C      sulfamethoxazole-trimethoprim  1 tablet Oral Once per day on Monday Wednesday Friday Claudine Leos PA-C          M*Modal software was used to dictate this note.  It may contain errors with dictating incorrect words or incorrect spelling. Please contact the provider directly with any questions.

## 2025-05-20 NOTE — PCC PHYSICAL THERAPY
Pt is a 74 y.o. female admitted to rehab with impairment of mobility/safety/functional mobility s/p R MCA 2 weeks ago and admission to [facility] on 5/12 due to worsening L sided weakness and fall. During the admission, MRI showed slightly worsening R MCA infarct as well as progressive brain metastasis. 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 PTA she reports that she was independent in ADLs, transfers, and gait with HHA 5 hours a day/6 days a week to assist with stair negotiation and companionship primarily. She does not drive, and relies on her ex- for transport to and from the grocery store. She lives in a 2 story home with 6 steps to enter with HR on R side, as well as 12 stairs inside the house with HR on R side. PTA she admits to going down the stairs on her butt due to fear of falling.     5/20/25  Pt making slow progress towards goals, lives alone in ML home, minimally improved L UE and LE hemiparesis. Continues to present with dec balance and wt shifting, dec midline correction. Progressed amb with use of L hand scoop. Pt has HHA however may benefit from inc support, given medical course and overall dx/prognosis. Need to discuss with family options and plan, availability of support at DC. Continue skilled PT intervention, goals set for 3 weeks, S level however pt lives alone. Suggest Reteam to maximize functional return and assess needs and support for DC.         5/27/25  Pt has declined in mobility , she has weaker LUE and LLE and more left lean.  She has declined to only transfers and walked at handrail due to concern of leg buckle. Performed stand pivot with Mod/ Max A x1 with std by assist of 2nd, or Mod A sit pivot transfers. Awaiting D/C to SNF.  She doesn't have anyone to help at home and wont reach independent level .

## 2025-05-20 NOTE — PROGRESS NOTES
"   05/20/25 0901   Pain Assessment   Pain Assessment Tool 0-10   Pain Score 3   Pain Location/Orientation Location: Back;Orientation: Right;Orientation: Lower   Pain Onset/Description Onset: Ongoing   Effect of Pain on Daily Activities chronic, use of lidocaine patch   Hospital Pain Intervention(s) Medication (See MAR)   Restrictions/Precautions   Precautions Bed/chair alarms;Cognitive;Fall Risk;Pain;Supervision on toilet/commode;Visual deficit   Cognition   Overall Cognitive Status WFL   Arousal/Participation Alert;Cooperative   Attention Attends with cues to redirect   Orientation Level Oriented X4   Memory Decreased short term memory;Decreased recall of recent events;Decreased recall of precautions   Following Commands Follows one step commands with increased time or repetition   Subjective   Subjective \"I never sleep well\"   Sit to Lying   Type of Assistance Needed Physical assistance   Physical Assistance Level 25% or less   Comment min A LE repositioning.   Sit to Lying CARE Score 3   Lying to Sitting on Side of Bed   Type of Assistance Needed Incidental touching   Physical Assistance Level No physical assistance   Lying to Sitting on Side of Bed CARE Score 4   Sit to Stand   Type of Assistance Needed Physical assistance   Physical Assistance Level 25% or less   Comment Cues for hand placement and attention to L body. CG/min A pending seat height.   Sit to Stand CARE Score 3   Bed-Chair Transfer   Type of Assistance Needed Physical assistance   Physical Assistance Level 25% or less   Comment min A stand pivot no AD to R.   Chair/Bed-to-Chair Transfer CARE Score 3   Walk 10 Feet   Type of Assistance Needed Physical assistance   Physical Assistance Level 26%-50%   Comment RW and L hand scoop.   Walk 10 Feet CARE Score 3   Walk 50 Feet with Two Turns   Type of Assistance Needed Physical assistance   Physical Assistance Level Total assistance   Comment x2 A with +CF, RW and L hand scoop.   Walk 50 Feet with Two " "Turns CARE Score 1   Ambulation   Primary Mode of Locomotion Prior to Admission Walk   Distance Walked (feet) 20 ft  (x3 in pbars, 4x40ft, 1x 90ft)   Assist Device Parallel Bars;Roller Walker  (L hand scoop)   Gait Pattern Inconsistant Aparna;Decreased foot clearance;L foot drag;L knee leonides;Step through;Improper weight shift;Lateral deviation   Limitations Noted In Balance;Endurance;Heel Strike;Posture;Speed;Strength;Swing;Device Management   Provided Assistance with: Balance;Trunk Support  (RW mgmt on L)   Walk Assist Level Moderate Assist   Findings short trial in parallel bars.   Does the patient walk? 2. Yes   Wheelchair mobility   Findings attempted for endurance however, seat too high for LE propulsion.   Does the patient use a wheelchair? 0. No   Therapeutic Interventions   Strengthening L LE manual resistance PF, with AROM DF x30. 2x10 - 2# LAQ and seated march.   Flexibility seated manual passive L DF/knee ext, 5x15 sec.   Balance B UE support, R Toe taps to 2inch x15, to 4inch 3x10 (for L LE wt bearing)   Neuromuscular Re-Education unsupported stand with mirror feedback for postural alignment. side to side wt shifting 3x10.   Assessment   Treatment Assessment Pt engaged in 60 min skilled PT intervention, reporting sleep is really a prob for her, only typically getting 3 hours. Reports already ready for her nap. Pt in bed upon entry, able to get self to EOB, needing reminder cues for hand placement without AD. Self cues for L LE \"kick\" during swing of gait. Re-trialed L hand scoop (padding added for comfort). with decent support, however needs A for L sided RW mgmt, advancing and turns. Progressing tolerance with adequate seated rest breaks as needed. Denied SOB however c/o ftg from not sleeping and requested to get back into bed at end of session. Continue balance and endurance, need to talk with family about A at DC. Does have HHA however may need inc hours or additional family support if able. "   Barriers to Discharge Inaccessible home environment;Decreased caregiver support   Barriers to Discharge Comments lives alone, ML Home.   PT Therapy Minutes   PT Time In 0900   PT Time Out 1000   PT Total Time (minutes) 60   PT Mode of treatment - Individual (minutes) 60   PT Mode of treatment - Concurrent (minutes) 0   PT Mode of treatment - Group (minutes) 0   PT Mode of treatment - Co-treat (minutes) 0   PT Mode of Treatment - Total time(minutes) 60 minutes   PT Cumulative Minutes 270   Therapy Time missed   Time missed? No

## 2025-05-20 NOTE — PROGRESS NOTES
05/20/25 1230   Pain Assessment   Pain Assessment Tool 0-10   Pain Score 3   Pain Location/Orientation Location: Back   Pain Onset/Description Onset: Ongoing   Hospital Pain Intervention(s) Medication (See MAR);Heat applied   Restrictions/Precautions   Precautions Bed/chair alarms;Fall Risk;Pain;Supervision on toilet/commode;Visual deficit   Weight Bearing Restrictions No   ROM Restrictions No   Lifestyle   Autonomy Pt very tired this afternoon, states she does not sleep well over night because of the steroids she takes.   Reciprocal Relationships lives alone, has two supportive dtrs ~hr away, HHA 5hrs/day   Service to Others Retired, previously working for [employer]   Intrinsic Gratification watching TV, her family   Lower Body Dressing   Type of Assistance Needed Physical assistance   Physical Assistance Level 26%-50%   Comment mod A, Pt worked on techniques to improve ability to get left side of pants up over her hip using the right hand today with ability to complete with min A after third trial. OT still stabilizing t/o the task.   Lower Body Dressing CARE Score 3   Putting On/Taking Off Footwear   Type of Assistance Needed Incidental touching   Physical Assistance Level No physical assistance   Comment EOB to don doff slipper socks using one handed technique   Putting On/Taking Off Footwear CARE Score 4   Sit to Lying   Type of Assistance Needed Incidental touching;Verbal cues   Physical Assistance Level No physical assistance   Comment entering on right side of the bed   Sit to Lying CARE Score 4   Lying to Sitting on Side of Bed   Type of Assistance Needed Supervision   Physical Assistance Level No physical assistance   Comment close supervision to exit to her left   Lying to Sitting on Side of Bed CARE Score 4   Sit to Stand   Type of Assistance Needed Physical assistance   Physical Assistance Level 25% or less   Comment CG/min A cues for hand placement   Sit to Stand CARE Score 3   Bed-Chair Transfer    Type of Assistance Needed Physical assistance   Physical Assistance Level 25% or less   Comment Min A stand pivot   Chair/Bed-to-Chair Transfer CARE Score 3   Therapeutic Exercise - ROM   UE-ROM   (LUE A/AAROM with OT offering light manual resistance through portions of range when able based on Pt's performance.)   Neuromuscular Education   Weight Bearing Technique Yes   LUE Weight Bearing Forearm seated;Extended arm seated;Extended arm standing   Response to Weight Bearing Technique Pt with inattention and weakness, needing [place] and elbow block to facilitate true weight bearing.   Functional Movement Patterns Extended work on modified approach, grasp and release of objects today due to Pt's rapid muscle fatigue and inattention of the hand causing increased frustration. Pt unable to place large pegs despite OT holding them veritcally for her to grasp. Inattention causing loss of control within the hand, then weakness causing lack of coordination resulting in hand hitting the table or board.   Response to Techniques Pt easily frustrated with FMC tasks today although willing to try all interventions.   Cognition   Overall Cognitive Status Impaired   Arousal/Participation Alert;Cooperative   Attention Attends with cues to redirect   Orientation Level Oriented X4   Memory Decreased short term memory;Decreased recall of recent events;Decreased recall of precautions   Following Commands Follows one step commands with increased time or repetition   Comments LUE inattention.   Vision   Vision Comments Pt completed multiple visual assessment worksheets with mild inattention noted once when crossing out letters, but no peripheral inattention on the sheets. OT also chellenged Pt for duel tasking in stance with colored paper targets with different color names written on the paper and Pt had to place the cubes into the appropriate piles.   Perception   Left Attention many VCs needed to pay attention to the positioning of her  left hand t/o the session.   Additional Activities   Additional Activities Comments Pt ambulated to the OT gym with right hand held assist- mod A   Activity Tolerance   Activity Tolerance Patient limited by fatigue   Assessment   Treatment Assessment Pt engaged in 90min OT intervention focused on NMR tasks for GM and FM tasks, one handed dressing techniques. See above for details of performance. In general, Pt appeared more frustrated during tasks today then past sessions. However Pt was also being seen in the afternoon after having a full PT session in the morning, contributing to her overall fatigue. Pt continues to benefit from training in compensatory strategies, one handed techniques, standing balance, NMR RUE to maximize functional independence and safety.   Prognosis Fair   Problem List Decreased strength;Decreased endurance;Impaired balance;Decreased mobility;Decreased coordination;Decreased safety awareness;Decreased range of motion   Barriers to Discharge Inaccessible home environment;Decreased caregiver support   Plan   Treatment/Interventions ADL retraining;Functional transfer training;Therapeutic exercise;Endurance training;Patient/family training;Compensatory technique education   Progress Slow progress, decreased activity tolerance   OT Therapy Minutes   OT Time In 1230   OT Time Out 1400   OT Total Time (minutes) 90   OT Mode of treatment - Individual (minutes) 90   OT Mode of treatment - Concurrent (minutes) 0   OT Mode of treatment - Group (minutes) 0   OT Mode of treatment - Co-treat (minutes) 0   OT Mode of Treatment - Total time(minutes) 90 minutes   OT Cumulative Minutes 330   Therapy Time missed   Time missed? No

## 2025-05-20 NOTE — PROGRESS NOTES
Progress Note - PMR   Name: Ayla Nye 74 y.o. female I MRN: [id number]  Unit/Bed#: -01 I Date of Admission: 5/15/2025   Date of Service: 5/20/2025 I Hospital Day: 5     Assessment & Plan  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)  HPI:   Diagnosed in 07/2024, s/p radiation in 08/2024 and 01/2025. Treatments complicated by toxicities and infection.   Most recently receiving Taoxtere with plans to start chemotherapy after discharge   MRI brain showed stable enhancing lesions within the L superior occipital lobe and L parietal lobe with surrounding vasogenic edema, stable foci of restricted diffusion within the R posterior frontal lobe most likely sequela of subacute ischemia.  CT CAP showed multiple lesions with increase in size.    Plan:   Continue increased dose of dexamethasone 3mg BID.  Continue Bactrim -160mg MWF  Continue Keppra 1000mg BID  See pain insertion   Follows with Dr. Castro (Oncology), Dr Vuong (Radiation/Oncology), and Palliative as outpatient  Monitor for new or worsening pain, decreased ROM,  changes in strength, sensation, balance, and mentation,increased fatigue, SOB, edema, abdominal pain, nausea, vomiting, constipation, wt loss, worsening intake/appetite   HTN (hypertension)  Continue home amlodipine 5mg daily  Monitor BP trends   Insomnia  Continue melatonin 3mg qHS, Seroquel 25 mg qHs (last increased 5/17), nighttime dose of gabapentin increased on 5/20  Encourage sleep hygiene (routine that promotes healthy circadian rhythm,avoid caffeine later in the day, quiet/dark room at night, reduce electronic before bedtime)  Hypothyroidism  Continue home levothyroxine 50mcg daily  Cancer associated pain  Current pain regimen: Scheduled Tylenol, gabapentin daily and qHS,, Lidoderm, and PRN Robaxin   Continue to monitor pain levels and adjust as needed   Anemia  Hgb currently stable at 10.3.  Baseline 9-10.  Continue home cyanocobalamin 1000mcg daily.  Continue to  monitor CBC  Leukocytosis  Recently within 10-15 range   Likely steroid induced   Continue to monitor CBC and signs/symptoms of infection   History of pulmonary embolism  Most recent CTA chest in March 2025 negative for PE   Previously on Xarelto but discontinued due to new foci of hemorrhage within the L occipital metastasis (April 2025)   Monitor respiratory status   History of Pneumocystis jirovecii pneumonia  Continue Bactrim 3x weekly   GERD (gastroesophageal reflux disease)  Continue PTA Protonix 40mg daily  History of stroke  Right frontal subacute infarct diagnosed on 04/2025  Residual left hemiparesis   Continue ASA 81mg daily and Lipitor 40mg daily.  Continue with PT/OT  Follow-up with Neurovascular in 4-6 weeks as outpatient.  Fall  Presented on 5/12/25 after experiencing mechanical fall with head strike.  Hx of L sided weakness s/p stroke in 04/2025.  CTH showed vasogenic edema L parietal vertex compatible with known brain metastasis, no hemorrhage identified.  MRI brain showed stable enhancing lesions within the L superior occipital lobe and L parietal lobe with surrounding vasogenic edema, stable foci of restricted diffusion within the R posterior frontal lobe most likely sequela of subacute ischemia.  Per Neurology, symptoms likely due to some increase in size of CNS metastasis on top of noted vasogenic edema.  Home dexamethasone increased from 2mg BID to 3mg BID  Impaired mobility and ADLs  Patient was evaluated by the rehabilitation team MD and an appropriate candidate for acute inpatient rehabilitation program at this time.  The patient will tolerate 3 hours/day 5 to 7 days/week of intensive physical, speech and occupational therapy   in order to obtain goals for community discharge  Due to the patient's functional Compared to their baseline level of function in addition to their ongoing medical needs, the patient would benefit from daily supervision from a rehabilitation physician as well as  rehabilitation nursing to implement and adjust the medical as well as functional plan of care in order to meet the patient's goals.  Bladder: Monitor closely for urinary incontinence and/or retention. Monitor urine output and encourage spontaneous voids. If unable to void spontaneously, will monitor with PVR bladder scans and initiate CIC regimen.  Skin: Monitor for breakdown, frequent turns, pressure offloading  Bowel: Monitor closely for constipation and/or incontinence. Will follow BMs and adjust bowel regimen to target soft, formed stools q1-2 days, per pt's regular schedule  Discharge date TBD, pending interdisciplinary meeting     At risk for venous thromboembolism (VTE)  Continue Lovenox while inpatient     Subjective   Patient is a 74 year-old female with a past medical history of stage IV adenocarcinoma of the lung metastatic to brain, history of ischemic stroke in April 2025, GERD, hyperlipidemia, hypertension, history of pulmonary embolism, presenting to [facility] due to a progressive cancer metastases, recent fall, recent stroke and debility. She was admitted from 5/5 to 5/7 due to left-sided hemiparesis as a result of right MCA territory frontal lobe stroke. Xarelto was discontinued in April 2025 after new foci of hemorrhage within the left occipital metastasis She presented to the ED again on 5/12 after a fall with headstrike. Imaging did not show an acute traumatic injury, though did show progression of metastases. Palliative care, neurology, and heme onc consulted. She was evaluated by PT and OT and deemed an appropiate candidate for [facility] due to functional deficits.     Chief Complaint: f/u ambulatory dysfunction and increased weakness    Interval: Seen and evaluated patient in room. She denies any acute concerns, is having mild back pain. She continues to endorse disrupted sleep, which may be due to recently increased dose of steroid; nightly gabapentin dose increased.  Denies abdominal pain, chest pain,  SOB. Last BM 5/20, continent of bowel and bladder.       Objective :  Temp:  [97.6 °F (36.4 °C)-98.4 °F (36.9 °C)] 97.6 °F (36.4 °C)  HR:  [80-86] 84  BP: (129-155)/(79-98) 135/82  Resp:  [18] 18  SpO2:  [93 %-94 %] 93 %  O2 Device: None (Room air)    Functional Update:  Physical Therapy Occupational Therapy Speech Therapy      Mod assist for bed mobility  Mod assist for sit to stand  Min assist for ambulation    Set-up for eating  Min assist for oral hygiene  Mod assist for bathing  Max assist for UB dressing  Mod to max assist for LB dressing  Mod assist for toilet hygiene  Mod assist for toilet transfer     FIM comprehension 5  FIM expression 5   FIM Social interaction 6  FIM problem solving 4  FIM memory 3             Physical Exam  Vitals and nursing note reviewed.   Constitutional:       General: She is not in acute distress.     Appearance: She is not toxic-appearing or diaphoretic.      Comments: Appears comfortable resting in bed    HENT:      Head: Normocephalic and atraumatic.      Mouth/Throat:      Mouth: Mucous membranes are moist.   Pulmonary:      Effort: Pulmonary effort is normal. No respiratory distress.     Musculoskeletal:      Right lower leg: No edema.      Left lower leg: No edema.     Neurological:      Mental Status: She is alert.      Comments: Alert and appropriate to questions    Psychiatric:         Mood and Affect: Mood normal.         Behavior: Behavior normal.           Scheduled Meds:  Current Facility-Administered Medications   Medication Dose Route Frequency Provider Last Rate    acetaminophen  975 mg Oral Daily Cristian Dias MD      And    acetaminophen  650 mg Oral Daily Cristian Dias MD      And    acetaminophen  975 mg Oral Daily Cristian Dias MD      And    acetaminophen  650 mg Oral Daily Cristian Dias MD      aluminum-magnesium hydroxide-simethicone  30 mL Oral Q4H PRN Claudine Leos PA-C      amLODIPine  10 mg Oral Daily ZULEYMA Viramontes       aspirin  81 mg Oral Daily Claudine Leos PA-C      atorvastatin  40 mg Oral Daily With Dinner Claudine Leos PA-C      vitamin B-12  1,000 mcg Oral Daily Claudine Leos PA-C      dexamethasone  3 mg Oral BID Claudine Leos PA-C      Diclofenac Sodium  2 g Topical 4x Daily Claudine Leos PA-C      enoxaparin  40 mg Subcutaneous Q24H Claudine Leos PA-C      [START ON 5/21/2025] gabapentin  200 mg Oral Daily Claudine Leos PA-C      And    gabapentin  400 mg Oral HS Claudine Leos PA-C      levETIRAcetam  1,000 mg Oral BID Claudine Leos PA-C      levothyroxine  50 mcg Oral Early Morning Claudine Leos PA-C      lidocaine  1 patch Topical Daily Claudine Leos PA-C      melatonin  3 mg Oral HS Claudine Leos PA-C      methocarbamol  250 mg Oral Q6H PRN Claudine Leos PA-C      pantoprazole  40 mg Oral Daily Before Breakfast Claudine Leos PA-C      QUEtiapine  25 mg Oral HS Cristian Dias MD      senna  1 tablet Oral Daily Claudine Leos PA-C      sulfamethoxazole-trimethoprim  1 tablet Oral Once per day on Monday Wednesday Friday Claudine Leos PA-C           Lab Results: I have reviewed the following results:  Results from last 7 days   Lab Units 05/19/25  0523 05/16/25  0537 05/15/25  0510   HEMOGLOBIN g/dL 10.3* 10.1* 9.4*   HEMATOCRIT % 34.1* 34.0* 31.2*   WBC Thousand/uL 15.97* 14.84* 14.75*   PLATELETS Thousands/uL 315 333 318     Results from last 7 days   Lab Units 05/19/25  0523 05/16/25  0537   BUN mg/dL 20 25   SODIUM mmol/L 140 140   POTASSIUM mmol/L 4.7 4.2   CHLORIDE mmol/L 104 104   CREATININE mg/dL 0.82 0.86   AST U/L  --  11*   ALT U/L  --  13            Claudine Leos PA-C  Physical Medicine and Rehabilitation  [facility]

## 2025-05-20 NOTE — ASSESSMENT & PLAN NOTE
Continue melatonin 3mg qHS, Seroquel 25 mg qHs (last increased 5/17), nighttime dose of gabapentin increased on 5/20  Encourage sleep hygiene (routine that promotes healthy circadian rhythm,avoid caffeine later in the day, quiet/dark room at night, reduce electronic before bedtime)

## 2025-05-20 NOTE — PROGRESS NOTES
05/20/25 0800   Pain Assessment   Pain Assessment Tool 0-10   Pain Score No Pain   Restrictions/Precautions   Precautions Bed/chair alarms;Cognitive;Fall Risk;Pain;Supervision on toilet/commode;Visual deficit   Comprehension   Comprehension (FIM) 5 - Understands basic directions and conversation   Expression   Expression (FIM) 5 - Needs help/cues only RARELY (< 10% of the time)   Social Interaction   Social Interaction (FIM) 6 - Interacts appropriately with others BUT requires extra  time   Problem Solving   Problem solving (FIM) 4 - Solves basic problems 75-89% of time   Memory   Memory (FIM) 4 - Recognizes/recalls/performs 75-89%   Speech/Language/Cognition Assessmetn   Treatment Assessment Pt was awake, alert and engaged for session this AM. Pt recalling current SLP from working together over the weekend. When engaging in review of daily events, pt was noted to recall most activities from yesterday given PT/OT/ST sessions benefiting from minimal probing questions to elicit more information. SLP did complete verbal medication management review, to where pt does confirm that despite not placing pills into pill box (HHA completes for pt), she reports that she is highly aware of the medications currently taking. It was noted that given verbal review to where SLP only providing pt w/ the name of the medication, pt was able to ID, state the purpose for taking and frequency in 21/21 scheduled medications. Pt was able to recall the PRN medications as well. Additionally noted was that pt was able to appropriately ID home vs new medications. SLP offering pt a written cheat sheet for home if warranted which pt was agreeable and will be provided in future sessions.     Otherwise, initiated word deduction/word retrieval strategies as pt had reported some mild word finding deficits upon completion of evaluation. SLP provided pt w/ 3 clue description words to where pt was to determine the object being described. Pt was noted  to be 14/15 accurate in this, benefiting from repetition of clue words x1 which then lead to ability to determine semantically related word (ie: stating clock vs watch). SLP will plan to complete higher level word deduction skills and introduce SFA in word retrieval strategies to ensure a plan if/when pt demonstrates increased word finding deficits over time. Lastly, SLP targeting higher level verbal reasoning and problem solving skills to where a problem was provided and then a course of action did not work. Pt was to elaborate on an alternative action given each problem. Pt was able to independently elicit alternate solutions in 6/8 items, benefiting from probing questions to elicit more information. Overall, one of the last items discussed lead to open ended discussion given Life Alert system which pt reported wearing when HHA is not around, but not at bedtime, where it's on bedside table. Currently, pt will continue to benefit from ongoing skilled SLP services targeting functional independence of cognitive linguistic skills in hopes for decreasing caregiver burden over time.    SLP Therapy Minutes   SLP Time In 0800   SLP Time Out 0845   SLP Total Time (minutes) 45   SLP Mode of treatment - Individual (minutes) 45   SLP Mode of treatment - Concurrent (minutes) 0   SLP Mode of treatment - Group (minutes) 0   SLP Mode of treatment - Co-treat (minutes) 0   SLP Mode of Treatment - Total time(minutes) 45 minutes   SLP Cumulative Minutes 165   Therapy Time missed   Time missed? No

## 2025-05-20 NOTE — ASSESSMENT & PLAN NOTE
Coronary needs Dr. Eamon Payton to sign the significant Hgb currently stable at 10.3.  Baseline 9-10.  Continue home cyanocobalamin 1000mcg daily.  Asymptomatic.  Continue to trend routine CBC.

## 2025-05-20 NOTE — PCC CARE MANAGEMENT
5/28 Family meeting held this past week. Family unable to provide the necessary assistance of supervision upon discharge. Plan to pursue subacute rehab with potential need for long term care placement. Patient had recent decline over weekend with increased weakness, MD making medication changes and will monitor prior to discharge to subacute. Family made aware of plan. Family will chose SNF when d/c date is confirmed. CM will assist in setting up transport and reserving facilities when time comes.       5/21 Patient reports she lives alone in 2SH with 6STE with railings and full flight of stairs to second floor. Bedrooms and full bath with tub shower on the second floor. Patient  independent PTA. Patient has a cane, walker ,elevated toilet seat with rails and shower chair. Patient has no prior outpt therapy, STR experience, is currently open to [facility] for RN/PT/OT. Patient has prescription coverage and gets medications from [facility] [address] and [facility] in [place]. CM confirmed patient's address, emergency contact and insurance information. Patient has 2 supportive daughters who live an hour away, supportive ex , and sister in law who can offer assistance upon discharge.Patient also has a home health aide 7 hours/day , her name is Kavya, and is paid privately. CM will follow for discharge needs.

## 2025-05-21 PROCEDURE — 97110 THERAPEUTIC EXERCISES: CPT

## 2025-05-21 PROCEDURE — 97535 SELF CARE MNGMENT TRAINING: CPT

## 2025-05-21 PROCEDURE — 97112 NEUROMUSCULAR REEDUCATION: CPT

## 2025-05-21 PROCEDURE — 97130 THER IVNTJ EA ADDL 15 MIN: CPT

## 2025-05-21 PROCEDURE — 99233 SBSQ HOSP IP/OBS HIGH 50: CPT | Performed by: PHYSICAL MEDICINE & REHABILITATION

## 2025-05-21 PROCEDURE — 99232 SBSQ HOSP IP/OBS MODERATE 35: CPT | Performed by: INTERNAL MEDICINE

## 2025-05-21 PROCEDURE — 97116 GAIT TRAINING THERAPY: CPT

## 2025-05-21 PROCEDURE — 97129 THER IVNTJ 1ST 15 MIN: CPT

## 2025-05-21 RX ORDER — DEXAMETHASONE 0.5 MG/1
3 TABLET ORAL 2 TIMES DAILY
Status: DISCONTINUED | OUTPATIENT
Start: 2025-05-21 | End: 2025-05-21

## 2025-05-21 RX ORDER — DEXAMETHASONE 0.5 MG/1
3 TABLET ORAL 2 TIMES DAILY
Status: DISCONTINUED | OUTPATIENT
Start: 2025-05-21 | End: 2025-05-23

## 2025-05-21 RX ADMIN — GABAPENTIN 200 MG: 100 CAPSULE ORAL at 08:57

## 2025-05-21 RX ADMIN — QUETIAPINE FUMARATE 25 MG: 25 TABLET ORAL at 21:30

## 2025-05-21 RX ADMIN — Medication 3 MG: at 21:30

## 2025-05-21 RX ADMIN — LEVOTHYROXINE SODIUM 50 MCG: 0.05 TABLET ORAL at 06:24

## 2025-05-21 RX ADMIN — DICLOFENAC SODIUM 2 G: 10 GEL TOPICAL at 09:02

## 2025-05-21 RX ADMIN — PANTOPRAZOLE SODIUM 40 MG: 40 TABLET, DELAYED RELEASE ORAL at 06:24

## 2025-05-21 RX ADMIN — ACETAMINOPHEN 975 MG: 325 TABLET, FILM COATED ORAL at 06:23

## 2025-05-21 RX ADMIN — CYANOCOBALAMIN TAB 1000 MCG 1000 MCG: 1000 TAB at 08:57

## 2025-05-21 RX ADMIN — SULFAMETHOXAZOLE AND TRIMETHOPRIM 1 TABLET: 800; 160 TABLET ORAL at 08:57

## 2025-05-21 RX ADMIN — ALUMINUM HYDROXIDE, MAGNESIUM HYDROXIDE, AND DIMETHICONE 30 ML: 200; 20; 200 SUSPENSION ORAL at 21:31

## 2025-05-21 RX ADMIN — DEXAMETHASONE 3 MG: 0.5 TABLET ORAL at 08:57

## 2025-05-21 RX ADMIN — ACETAMINOPHEN 975 MG: 325 TABLET, FILM COATED ORAL at 17:34

## 2025-05-21 RX ADMIN — ATORVASTATIN CALCIUM 40 MG: 40 TABLET, FILM COATED ORAL at 17:34

## 2025-05-21 RX ADMIN — DEXAMETHASONE 3 MG: 0.5 TABLET ORAL at 17:34

## 2025-05-21 RX ADMIN — ACETAMINOPHEN 650 MG: 325 TABLET, FILM COATED ORAL at 12:27

## 2025-05-21 RX ADMIN — METHOCARBAMOL TABLETS 250 MG: 500 TABLET, COATED ORAL at 21:31

## 2025-05-21 RX ADMIN — ACETAMINOPHEN 650 MG: 325 TABLET, FILM COATED ORAL at 23:33

## 2025-05-21 RX ADMIN — SENNOSIDES 8.6 MG: 8.6 TABLET, FILM COATED ORAL at 08:57

## 2025-05-21 RX ADMIN — METHOCARBAMOL TABLETS 250 MG: 500 TABLET, COATED ORAL at 12:30

## 2025-05-21 RX ADMIN — LEVETIRACETAM 1000 MG: 500 TABLET, FILM COATED ORAL at 21:30

## 2025-05-21 RX ADMIN — LIDOCAINE 5% 1 PATCH: 700 PATCH TOPICAL at 09:01

## 2025-05-21 RX ADMIN — ENOXAPARIN SODIUM 40 MG: 40 INJECTION SUBCUTANEOUS at 13:37

## 2025-05-21 RX ADMIN — GABAPENTIN 400 MG: 400 CAPSULE ORAL at 21:30

## 2025-05-21 RX ADMIN — AMLODIPINE BESYLATE 10 MG: 10 TABLET ORAL at 08:57

## 2025-05-21 RX ADMIN — LEVETIRACETAM 1000 MG: 500 TABLET, FILM COATED ORAL at 08:57

## 2025-05-21 RX ADMIN — ASPIRIN 81 MG CHEWABLE TABLET 81 MG: 81 TABLET CHEWABLE at 08:57

## 2025-05-21 NOTE — PROGRESS NOTES
05/21/25 1000   Pain Assessment   Pain Assessment Tool 0-10   Pain Score 3   Pain Location/Orientation Orientation: Lower;Location: Back   Pain Onset/Description Onset: Ongoing   Hospital Pain Intervention(s) Repositioned;Rest  (pt not due for pain medication until after OT session)   Restrictions/Precautions   Precautions Bed/chair alarms;Fall Risk;Pain;Supervision on toilet/commode;Visual deficit;Cognitive   Weight Bearing Restrictions No   ROM Restrictions No   Oral Hygiene   Type of Assistance Needed Set-up / clean-up   Physical Assistance Level No physical assistance   Comment seated in w/c at sink   Oral Hygiene CARE Score 5   Grooming   Able To Initiate Tasks;Brush/Clean Teeth;Wash/Dry Hands;Comb/Brush Hair   Limitation Noted In Strength;Timeliness   Findings S/U A for all grooming tasks while seated in w/c at sink. Completed seated 2* c/o fatigue after showering   Shower/Bathe Self   Type of Assistance Needed Physical assistance;Verbal cues;Adaptive equipment   Physical Assistance Level 26%-50%   Comment Pt engaged in showering, able to wash 9/10 parts, washing UB and BLEs while seated on shower chair, and using xleg technique and dynamic fxl reaching to wash LEs. A required to wash RUE 2* LUE weakness. [place] for balance in stance while pt washed goldie/rear with LUE support on GB. Vc's for hand placement and safety awareness.   Shower/Bathe Self CARE Score 3   Bathing   Assessed Bath Style Shower   Anticipated D/C Bath Style Shower   Able to Gather/Transport No   Able to Adjust Water Temperature No   Able to Wash/Rinse/Dry (body part) Left Arm;L Upper Leg;R Upper Leg;L Lower Leg/Foot;R Lower Leg/Foot;Chest;Abdomen;Perineal Area;Buttocks   Limitations Noted in Balance;Coordination;Endurance;Strength;Timeliness;Safety   Positioning Seated;Standing   Adaptive Equipment Shower Bars;Shower Seat;Hand Held Shower   Tub/Shower Transfer   Limitations Noted In Balance;Coordination;Safety;UE Strength;LE Strength    Adaptive Equipment Grab Bars;Seat with Back   Assessed Shower   Findings Denton SPT w/c<>shower chair with UE support on GB, and A to stabilize LUE on GB. Vc's for awareness of how body was centered on shower chair, as pt initially sitting far to L side of chair.   Upper Body Dressing   Type of Assistance Needed Physical assistance;Verbal cues   Physical Assistance Level 25% or less   Comment [place]enton to manage OH shirt down back on L side   Upper Body Dressing CARE Score 3   Lower Body Dressing   Type of Assistance Needed Physical assistance;Verbal cues   Physical Assistance Level 26%-50%   Comment seated to thread LEs using dynamic fxl reaching with vc's to thread LLE first, Denton to manage over hips in stance with RUE only with increased time   Lower Body Dressing CARE Score 3   Putting On/Taking Off Footwear   Type of Assistance Needed Supervision   Physical Assistance Level No physical assistance   Comment CS seated using xleg technique with increased time to doff/don socks using R hand only   Putting On/Taking Off Footwear CARE Score 4   Dressing/Undressing Clothing   Remove UB Clothes Pullover Shirt   Don UB Clothes Pullover Shirt   Remove LB Clothes Pants;Undergarment;Socks   Don LB Clothes Pants;Undergarment;Socks   Limitations Noted In Balance;Coordination;Endurance;Strength;Timeliness;Safety   Positioning Supported Sit;Standing   Sit to Lying   Type of Assistance Needed Supervision   Physical Assistance Level No physical assistance   Comment increased time required 2* LLE weakness and generalized fatigue   Sit to Lying CARE Score 4   Lying to Sitting on Side of Bed   Type of Assistance Needed Supervision   Physical Assistance Level No physical assistance   Comment increased time required   Lying to Sitting on Side of Bed CARE Score 4   Sit to Stand   Type of Assistance Needed Physical assistance;Verbal cues   Physical Assistance Level 25% or less   Comment CGA-Denton, vc's for hand placement   Sit to Stand CARE  Score 3   Bed-Chair Transfer   Type of Assistance Needed Physical assistance;Verbal cues   Physical Assistance Level 25% or less   Comment Denton SPT, no AD   Chair/Bed-to-Chair Transfer CARE Score 3   Exercise Tools   Exercise Tools Yes   Other Exercise Tool 1 Seated w/Sup, [id number] each of RUE only chest press, OH press, elbow flexion, horiz ABD, and prograde rowing using 1# DB for increased RUE strength during fxl STS/transfers. Pt tolerated well with rest breaks between sets to manage generalized fatigue.   Cognition   Overall Cognitive Status Impaired   Arousal/Participation Alert;Cooperative   Attention Attends with cues to redirect   Orientation Level Oriented X4   Memory Decreased short term memory;Decreased recall of recent events;Decreased recall of precautions   Following Commands Follows one step commands with increased time or repetition   Activity Tolerance   Activity Tolerance Patient limited by fatigue   Assessment   Treatment Assessment Pt seen for 90min skilled OT session focused on ADL retraining (shower, LB dressing skills), fxl STS/transfer training, bed mobility, fxl activity tolerance, and RUE strengthening, for increased independence w/ADLs and decreased caregiver burden. See detailed descriptions of fxl performance above. Pt tolerated session well, although limited by significant generalized fatigue, requiring increased time for all fxl tasks and frequent rest breaks to manage. Pt requested to return to bed at end of session to rest. Pt cont to report poor sleep 2* steroids. Pt cont to be limited by decreased strength, endurance, standing balance/tolerance, fxl cognition, and generalized fatigue. Pt would benefit from continued skilled OT focused on ADL retraining, compensatory strategy edu, one-handed techniques, standing balance/tolerance, LUE neuro re-ed, ECT edu, D/C planning, and fxl activity tolerance.   Prognosis Fair   Problem List Decreased strength;Decreased endurance;Impaired  balance;Decreased mobility;Decreased range of motion;Decreased coordination;Decreased cognition;Decreased safety awareness;Impaired tone;Pain;Decreased skin integrity   Plan   Treatment/Interventions ADL retraining;Functional transfer training;Therapeutic exercise;Endurance training;Patient/family training;Equipment eval/education;Bed mobility;Compensatory technique education   Progress Slow progress, decreased activity tolerance   Discharge Recommendation   Rehab Resource Intensity Level, OT   (pending)   OT Therapy Minutes   OT Time In 1000   OT Time Out 1130   OT Total Time (minutes) 90   OT Mode of treatment - Individual (minutes) 90   OT Mode of treatment - Concurrent (minutes) 0   OT Mode of treatment - Group (minutes) 0   OT Mode of treatment - Co-treat (minutes) 0   OT Mode of Treatment - Total time(minutes) 90 minutes   OT Cumulative Minutes 420   Therapy Time missed   Time missed? No

## 2025-05-21 NOTE — PROGRESS NOTES
"   05/21/25 1430   Pain Assessment   Pain Score No Pain   Restrictions/Precautions   Precautions Bed/chair alarms;Cognitive;Fall Risk;Supervision on toilet/commode;Visual deficit   Cognition   Arousal/Participation Alert;Cooperative   Attention Attends with cues to redirect   Memory Decreased short term memory;Decreased recall of precautions   Following Commands Follows one step commands with increased time or repetition   Comments L UE inattention   Subjective   Subjective \"I am not doing too good today\"   Sit to Stand   Type of Assistance Needed Physical assistance   Physical Assistance Level 25% or less   Comment min A , assist to place L hand on scoop of RW   Sit to Stand CARE Score 3   Bed-Chair Transfer   Type of Assistance Needed Physical assistance   Physical Assistance Level 25% or less   Comment SPT with no AD   Chair/Bed-to-Chair Transfer CARE Score 3   Walk 10 Feet   Type of Assistance Needed Physical assistance   Physical Assistance Level 26%-50%   Comment RW with L hand scoop   Walk 10 Feet CARE Score 3   Walk 50 Feet with Two Turns   Comment limited by fatigue   Reason if not Attempted Safety concerns   Walk 50 Feet with Two Turns CARE Score 88   Walk 150 Feet   Reason if not Attempted Safety concerns   Walk 150 Feet CARE Score 88   Ambulation   Primary Mode of Locomotion Prior to Admission Walk   Distance Walked (feet) 31 ft  (X 4)   Assist Device Roller Walker  (with L hand scoop)   Gait Pattern Inconsistant Aparna;L foot drag;Decreased foot clearance;Narrow BILL;Improper weight shift   Limitations Noted In Balance;Endurance;Swing   Walk Assist Level Moderate Assist   Findings SHort distance ambulation using RW with L hand scoop focusing on turning and approaching seat   Does the patient walk? 2. Yes   Wheel 50 Feet with Two Turns   Reason if not Attempted Activity not applicable   Wheel 50 Feet with Two Turns CARE Score 9   Wheel 150 Feet   Reason if not Attempted Activity not applicable   Wheel " 150 Feet CARE Score 9   Wheelchair mobility   Method Right upper extremity;Right lower extremity;Left lower extremity   Distance Level Surface (feet) 20 ft   Findings seat too high, can take out cushion if to be trialed again next time   Stairs   Findings not focused today   Therapeutic Interventions   Strengthening seated LAQ and hi flex, add squeeze with ball, ankle DF AROM   Flexibility B hamstring ans gastroc gentle stretching   Neuromuscular Re-Education at bottom of  steps; L foot step ups with targeted area on step to inc coordination and better foot placement.   Assessment   Treatment Assessment Pt. reported feeling more fatigue today but is willing to participate in PT. Pt. noted to have inc instability on L side and is having more difficulty keeping L elbow stable on RW vs how she did on saturday. Pt. also needed extra assistance with wt. shifting to the R as well as with RW management. No major buckling noted with ambulation when walking a short limited distance. Pt. cont to have problems sleeping at night, which can contibute to dec activity tolerance in therapy. No other complaints rpeorted. SHe was assisted back to recliner at end of session with alarms on and all needs in place.   Problem List Decreased strength;Decreased endurance;Impaired balance;Decreased mobility;Decreased coordination;Decreased cognition;Impaired judgement;Decreased safety awareness;Decreased skin integrity   Barriers to Discharge Inaccessible home environment;Decreased caregiver support   PT Barriers   Functional Limitation Car transfers;Stair negotiation;Standing;Transfers;Walking   Plan   Treatment/Interventions Functional transfer training;LE strengthening/ROM;Elevations;Therapeutic exercise;Endurance training;Patient/family training;Equipment eval/education;Bed mobility;Gait training   Discharge Recommendation   Rehab Resource Intensity Level, PT   (TBD , but will need  asisstance at d/c.)   Equipment Recommended    (TBD)

## 2025-05-21 NOTE — TEAM CONFERENCE
Acute RehabilitationTeam Conference Note  Date: 5/21/2025   Time: 10:06 AM       Patient Name:  Ayla Nye       Medical Record Number: [id number]   YOB: 1950  Sex: Female          Room/Bed:  [facility] 262/[facility] 262-01  Payor Info:  Payor: MEDICARE / Plan: MEDICARE A AND B / Product Type: Medicare A & B Fee for Service /      Admitting Diagnosis: Metastasis to brain (HCC) [C79.31]  Lung cancer (HCC) [C34.90]   Admit Date/Time:  5/15/2025  3:23 PM  Admission Comments: No comment available     Primary Diagnosis:  Lung cancer metastatic to brain (HCC)  Principal Problem: Lung cancer metastatic to brain (HCC)    Patient Active Problem List    Diagnosis Date Noted    At risk for venous thromboembolism (VTE) 05/16/2025    Fall 05/15/2025    Impaired mobility and ADLs 05/15/2025    Vasogenic brain edema (HCC) 05/06/2025    Abnormal CT of the chest 05/06/2025    Stroke-like symptoms 05/05/2025    History of stroke 05/05/2025    NSVT (nonsustained ventricular tachycardia) (HCC) 04/23/2025    Left-sided weakness 04/21/2025    Right loin pain 04/01/2025    Complication of chemotherapy/immunotherapy 02/15/2025    GERD (gastroesophageal reflux disease) 02/14/2025    Epistaxis 02/03/2025    History of Pneumocystis jirovecii pneumonia 01/24/2025    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    Right kidney mass 01/02/2025    History of pulmonary embolism 12/31/2024    Dyspnea on exertion 12/31/2024    Imbalance 12/31/2024    Hyponatremia 11/02/2024    Leukocytosis 10/27/2024    Anemia 10/25/2024    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/23/2024    Palliative care patient 09/20/2024    Cancer associated pain 09/12/2024    Goals of care, counseling/discussion 09/11/2024    Right hip pain 09/10/2024    Altered mental status 09/09/2024    Hypothyroidism 09/09/2024    Abnormal imaging of central nervous system 09/09/2024    Acute metabolic encephalopathy 08/21/2024    Stage IV adenocarcinoma of lung  metastatic to  brain (HCC) 07/31/2024    Brain mass 07/30/2024    Metastatic cancer to brain (HCC) 07/29/2024    Dehydration 04/30/2024    Moderate protein-calorie malnutrition (HCC) 04/29/2024    Bilateral leg pain 04/09/2024    Insomnia 04/09/2024    Metastatic adenocarcinoma (HCC) 03/29/2024    Age-related osteoporosis without current pathological fracture 11/13/2023    Encounter for monitoring of hydroxyurea therapy 05/03/2023    JAK2 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 05/03/2023    HTN (hypertension) 08/10/2022    Mixed hyperlipidemia 08/10/2022    Essential thrombocytosis (HCC) 07/02/2020    TB lung, 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    Psoriatic arthritis (HCC) 09/10/2019       Physical Therapy:    Weight Bearing Status: Full Weight Bearing  Transfers: Minimal Assistance  Bed Mobility: Incidental Touching  Amulation Distance (ft): 90 feet  Ambulation: Assist of 2  Assistive Device for Ambulation: Roller Walker  Roller Walker Attachments:  (L hand scoop)  Number of Stairs: 4  Assistive Device for Stairs: Bilateral Hand Rails  Stair Assistance: Minimal Assistance  Ramp: Moderate Assistance  Assistive Device for Ramp: Hand Hold Assistance  Discharge Recommendations: Home with:  DC Home with:: First Floor Setup, Family Support, Home Physical Therapy (TBD pending needs and support at [place])    Pt is a 74 y.o. female admitted to rehab with impairment of mobility/safety/functional mobility s/p R MCA 2 weeks ago and admission to [facility] on 5/12 due to worsening L sided weakness and fall. During the admission, MRI showed slightly worsening R MCA infarct as well as progressive brain metastasis. 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 PTA she reports that she was independent in ADLs, transfers, and gait with HHA 5 hours a day/6 days a week to assist with stair negotiation and companionship primarily.  She does not drive, and relies on her ex- for transport to and from the grocery store. She lives in a 2 story home with 6 steps to enter with HR on R side, as well as 12 stairs inside the house with HR on R side. PTA she admits to going down the stairs on her butt due to fear of falling.     5/20/25  Pt making slow progress towards goals, lives alone in  home, minimally improved L UE and LE hemiparesis. Continues to present with dec balance and wt shifting, dec midline correction. Progressed amb with use of L hand scoop. Pt has HHA however may benefit from inc support, given medical course and overall dx/prognosis. Need to discuss with family options and plan, availability of support at DC. Continue skilled PT intervention, goals set for 3 weeks, S level however pt lives alone. Suggest Reteam to maximize functional return and assess needs and support for DC.         Occupational Therapy:  Eating: Supervision (s/u assist)  Grooming: Supervision  Bathing: Moderate Assistance  Bathing: Moderate Assistance  Upper Body Dressing: Moderate Assistance  Lower Body Dressing: Moderate Assistance (footwear CG/Myla)  Toileting: Minimal Assistance  Tub/Shower Transfer: Moderate Assistance  Toilet Transfer: Minimal Assistance  Cognition: Exceptions to WNL  Cognition: Decreased Attention, Decreased Safety  Orientation: Person, Place, Time, Situation  Discharge Recommendations:  (TBD based on functional progress.)       Pt is a 75yo female diagnosed with lung cancer in 07/2024, Hx of radiation in 08/2024 and 01/2025. PMH of lung adenocarcinoma with brain metastasis, hx of PE, prior stroke, HTN, hypothyroidism, and HLD. Pt originally presented to [facility] on 5/12/25 due to a mechanical fall from ongoing left sided weakness, leading to head strike.  Patient had recently had a stroke with L sided weakness about 2 weeks prior and had been placed on steroids to assist with vasogenic edema. Pt's cancer treatments were  placed on hold due to multiple admission/toxicities/PCP pneumonia/PE, with plans to start chemotherapy again at oncology f/u after discharge from [facility]. MRI brain showed stable enhancing lesions within the L superior occipital lobe and L parietal lobe with surrounding vasogenic edema, stable foci of restricted diffusion within the R posterior frontal lobe most likely sequela of subacute ischemia. CT CAP showed multiple lesions with increase in size. Pt lives at home alone, but does have a HHA for five hours/day for 6 days a week.  States that she can increase time for the home health aid but she wants to focus getting on stronger first.  Home health aid helps with managing her medications for her.  Has 2 daughters in the area that also come to help her at times. PLOF was ambulation with nothing in the house, but admits to furniture walking; using no AD to go up the steps but comes down on her bottom due to fear of falling recently. Prior to CVA Pt was independent with selfcare, however has needed increased assistance on left side since. Pt presents with impairments in visual and physical inattention to left side, left hemiparesis, balance, endurance, safety awareness, problem solving and memory which are significantly limiting her ability to safely and independently perform her daily functional routines. In addition her home set-up with no bathroom or bed on the first floor is a safety concern for discharge. Chemotherapy is for palliative measures however the patient and family have not been ready to discuss hospice services at this time. Pt would benefit from skilled OT interventions to maximize functional return in LUE, improve balance, train in compensatory and adaptive strategies, assist with discharge planning. An early family meeting to discuss long term plans would be beneficial to help guide treatment and goals. At this time goals are being set for 24hr supervision in 2-3wks. OT spoke with physician about Pt's  difficulty tolerating full 90min sessions today with need to sleep between. He is in agreement to order 900min/7days for Pt given her current diagnosis and limited endurance.    5/20/25:   ADL Status:   Grooming: supervision seated, Min A with VC's in stance Bathing: mod   UB Dressing: mod  LB Dressing: mod  Toileting:   min Toilet Transfers: min  Goals: supervision  Meeting Goals:  making gradual gains  Modify Goals:   not at this time    Barriers Interventions   Left hemiparesis UE>LE NMR, NPP, TE, weight bearing tasks   Inattention to left hemibody and visually Visual training and attention tasks   balance Static and dynamic standing tasks   Insight/acceptance of CLOF Discussion of home environment and safety risks                 Family Training: family have observed therapy sessions but not participated hands-on  Anticipated D/C Date: not yet determined, estimated 2-3wk ELOS  D/C Plan/Location:  home at this time, however will need a fmaily meeting to determine long term plan based on Pt's Dx  D/C Therapy Recommendations: TBD  DME: TBD      Speech Therapy:  Mode of Communication: Verbal  Cognition: Exceptions to WNL  Cognition: Decreased Memory, Decreased Executive Functions, Decreased Attention, Decreased Comprehension  Orientation: Person, Place, Time, Situation  Discharge Recommendations: Home with:  DC Home with:: 24 Hour Supervision, Family Support (ST services pending progress)  Pt currently being followed for cognitive linguistic skills in which currently pt is making steady progress. Upon admission to the unit, pt did complete formalized cognitive assessment, the CLQT+ on initial evaluation with a Composite Severity Rating score of 3.4 out of 4.0, correlating to overall mildly impaired cognitive linguistic impairments at time of evaluation and in comparison to age matched peers ranging from 70-88 y/o. Cognitive barriers which present at this time include: decreased attention, visual attention, ST memory  recall , problem solving, reasoning, sequencing, organization of thoughts, judgement, and slower processing. In order to address the noted barriers, skilled SLP services will address this by targeting the following interventions: verbal problem solving task, verbal working memory tasks, visual memory recall tasks, drawing conclusions activities, written sequencing tasks, verbal sequencing tasks, categorization tasks , written financial management tasks, verbal review of current medications, written review of medications, written calendar tasks, tangible scheduling tasks, functional reading tasks, and family education/training. Of note pt does report intermittent word finding deficits in which word deduction and word retrieval strategies are being reviewed.    Current level of functioning:   Comprehension: supervision  Expression: supervision  Social Interaction: mod I  Executive functions: min-mod A   Memory: min-mod A    Family training/education has been initiated with pt's family (2 dtrs Juliana and Sue as well as ex- Sandro) about role of services and targeting tasks which are not only basic review but ensuring consistency in ability to complete I ADL's as pt reported. It is suspected that pt would likely require some level of supervision in regard to I ADL's at discharge to ensure thoroughness and accuracy given finances, scheduling and medications. Recommendations at time of discharge are for the potential for continued ST services but pending disposition plan and if pt would have increase support/supervision at home.    This week, focus for continued services to target functional executive function skills, including problem solving, reasoning, judgement, sequencing, but also targeting ST memory strategies, as well as I ADL review and initiation of word deduction/retrieval tasks. At this time, pt will continue to benefit from skilled cognitive linguistic tx session to maximize overall functional  independence given overall cognitive linguistic skills in attempts to decreased caregiver burden over time.     Nursing Notes:     Diet Type: Regular/House, Thin Liquids                                                                     Pain Location/Orientation: Orientation: Lower, Location: Back  Pain Score: 0                       Hospital Pain Intervention(s): Medication (See MAR)          Ayla Nye is a 74 y.o. female with a PMH of lung adenocarcinoma with brain metastasis, hx of PE, prior stroke, HTN, hypothyroidism, and HLD who originally presented to [facility] on 5/12/25 due to a mechanical fall from ongoing left sided weakness, leading to head strike.  Patient had recently had a stroke with L sided weakness about 2 weeks prior and had been placed on steroids to assist with vasogenic edema.  CTH showed vasogenic edema L parietal vertex compatible with known brain metastasis, no hemorrhage identified.  Neurology was consulted and recommended obtaining MRI brain.  MRI brain showed stable enhancing lesions within the L superior occipital lobe and L parietal lobe with surrounding vasogenic edema and stable foci of restricted diffusion within the R posterior frontal lobe, most likely sequela of subacute ischemia.  Neurology felt that symptoms were likely due to some increase in size of CNS metastasis on top of noted vasogenic edema.  Oncology was consulted and recommended increasing dexamethasone from 2mg to 3mg BID.  Recommending to hold chemotherapy until outpatient follow-up.  Will require follow-up with Neurovascular in 4-6 weeks.  Evaluated by PT/OT and recommended for acute inpatient rehabilitation.      Admitted to [facility] on 5/15/2025; we are consulted for medical clearance.     Bowel will be maintained with Senna 8.6 mg daily. Pain will be managed with Tylenol 975 mg every 6 hours, Voltaren gel 2 grams 4 times daily to affected area, Neurontin capsule 200 mg daily, and  300 mg daily at bedtime, Lidoderm 5% patch daily, Robaxin 250 mg every 6 hours PRN. DVT prophylaxis of choice is as follows: ASA 81 mg daily, Lovenox SQ inj 40 mg every 24 hours. Insomnia will be managed with melatonin 3 mg tablet daily at bedtime, and good sleep hygiene. Hypertension will be managed with continuing home amlodipine 10mg daily and monitoring BP with routine VS. Hypothyroidism will be managed with home levothyroxine 50mcg daily, recommend to repeat TSH in 6 weeks as outpatient. Anemia will be managed with cyanocobalamin 1000mcg daily, currently asymptomatic, will continue to trend routine CBC. Leukocytosis-no active s/s of infection, week of 5/15 WBC lab results are likely steroid induced, continue to trend routine CBC. GERD will be managed with Protonix 40 mg daily before breakfast. Stroke like symptoms will be managed with neurovascular checks Q shift, Follow-up with Neurology in 4-6 weeks as outpatient, continue with Dexamethasone 3 mg 2 times daily. Stage IV adenocarcinoma of lung metastatic to brain managed with dexamethasone 3mg BID, Bactrim -160mg MWF, Keppra 1000mg BID. Stroke like symptoms will be managed with dexamethasone 3mg BID.       This week we will continue to monitor vital signs and lab values. We will manage pain with the above orders so the patient can participate in her therapy sessions to her optimal abilities. We will teach the patient how to conserve energy so that she may perform ADL's independently as much as she can. We will educate the patient on the importance of repositioning and off-loading pressure to help lower the risk of skin breakdown. We will prevent falls by keeping personal items and call bell within reach, we will also initiate and maintain hourly rounding      Case Management:     Discharge Planning  Living Arrangements: Lives Alone  Support Systems: Daughter, Home care staff  Assistance Needed: To be determined  Type of Current Residence: Private  residence  Current Home Care Services: Yes  Type of Current Home Care Services: Home health aide  Patient reports she lives alone in 2SH with 6STE with railings and full flight of stairs to second floor. Bedrooms and full bath with tub shower on the second floor. Patient  independent PTA. Patient has a cane, walker ,elevated toilet seat with rails and shower chair. Patient has no prior outpt therapy, STR experience, is currently open to [facility] for RN/PT/OT. Patient has prescription coverage and gets medications from [facility] on [address] [place] [address] and [facility] in [place]. CM confirmed patient's address, emergency contact and insurance information. Patient has 2 supportive daughters who live an hour away, supportive ex , and sister in law who can offer assistance upon discharge.Patient also has a home health aide 7 hours/day , her name is Kavya, and is paid privately. CM will follow for discharge needs.          Is the patient actively participating in therapies? yes  List any modifications to the treatment plan:     Barriers Interventions   Brain mets with vasogenic edema, secondary insomnia from steroids, pain related to cancer P.o. steroids, medical management, sleep hygiene. Medication management, and adjustment   Insight to deficits Re education    Mild cog deficits; short term recall, executive functioning,judgement Functional prob solving skills, STM strategies and skills(verbal and visual), sequencing activities.,verbal reasoning tasks,family training and education with daughters   Decreased balance,endurance, Left hemiparesis, left inattention Determine best device to use. Walker with hand scoop to increase left . Dana examples , cause and effect strategies, 900 minute program, rest breaks in bed between therapies. NPP, tx exercises, attention tasks   Lives alone , multilevel home HHA 5hrs/day, will need more. Plan family meeting.     Is the patient making expected progress toward goals?  yes  List any update or changes to goals:       Medical Goals: Patient will be medically stable for discharge to [facility] restrictive envrionment upon completion of rehab program and Patient will be able to manage medical conditions and comorbid conditions with medications and follow up upon completion of rehab program    Weekly Team Goals:     PT:S goals for mobility and transfers  OT:S  self care, may change to min A level  ST: S min A   Rehab Team Goals  ADL Team Goal: Patient will require supervision with ADLs with least restrictive device upon completion of rehab program  Bowel/Bladder Team Goal: Patient will return to premorbid level for bladder/bowel management upon completion of rehab program  Cognitive Team Goal: Patient will be independent for basic  tasks and require supervision for complex tasks upon completion of rehab program    Discussion: Patient participating in therapies;. PT:Min A transfers, Mod A amb 90'  OT:Mod A self care  ST: range from S to mod A , depending on task and time of day    Anticipated Discharge Date:  5/29  [facility] RN/PT/OT/ST/HHA. With family support. Recommendation for 24/7 supervision  [facility] Team Members Present:The following team members are supervising care for this patient and were present during this Weekly Team Conference.    Physician: Dr. Arun MD  : Martine Rosenberg, RN  Registered Nurse: Amairani Camacho RN  Physical Therapist: Ela Sheikh PT  Occupational Therapist: Nakia Ronquillo MS, OTR/L  Speech Therapist: Rina Lawrence MA, CCC-SLP

## 2025-05-21 NOTE — PROGRESS NOTES
Progress Note - Internal Medicine   Name: Ayla Nye 74 y.o. 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  Hx of radiation in 08/2024 and 01/2025.  Hx of hold on treatments due to multiple admission/toxicities/PCP pneumonia/PE.  Most recently receiving Taoxtere with plans to start chemotherapy after admission.    MRI brain showed stable enhancing lesions within the L superior occipital lobe and L parietal lobe with surrounding vasogenic edema, stable foci of restricted diffusion within the R posterior frontal lobe most likely sequela of subacute ischemia.  CT CAP showed multiple lesions with increase in size.    Continue increased dose of dexamethasone 3mg BID.  Continue Bactrim -160mg MWF.  Continue Keppra 1000mg BID.    Follows with Dr. Castro (Oncology), Dr Vuong (Radiation/Oncology), and Palliative as outpatient.  Follow-up with Neurology in 4-6 weeks as outpatient.  HTN (hypertension)  Home regimen: amlodipine 5mg daily.  Continue amlodipine 10mg daily.  Monitor BP with routine VS.  Insomnia  Continue melatonin 3mg and Seroquel 25mg at HS.  Hypothyroidism  Continue home levothyroxine 50mcg daily.  TSH 0.408 and T4 0.79 on 5/12.  Recommend repeat TSH in 6 weeks as outpatient.  Anemia  Hgb currently stable at 10.3.  Baseline 9-10.  Continue home cyanocobalamin 1000mcg daily.  Asymptomatic.  Continue to trend routine CBC.  Leukocytosis  WBC count currently 15.97.  No active s/s of infection.  Likely steroid induced.  Continue to trend routine CBC.  History of pulmonary embolism  Most recent CTA chest/PE study in 03/2025 showed no PE.  Had been on Xarelto in the past but discontinued due to new foci of hemorrhage within the L occipital metastasis.  Monitor for s/s of reoccurrence.  GERD (gastroesophageal reflux  disease)  Continue home Protonix 40mg daily.  Stroke-like symptoms  Presented on 25 after experiencing mechanical fall with head strike.  Hx of L sided weakness s/p stroke in 2025.  CTH showed vasogenic edema L parietal vertex compatible with known brain metastasis, no hemorrhage identified.  MRI brain showed stable enhancing lesions within the L superior occipital lobe and L parietal lobe with surrounding vasogenic edema, stable foci of restricted diffusion within the R posterior frontal lobe most likely sequela of subacute ischemia.    Per Neurology - symptoms likely due to some increase in size of CNS metastasis on top of noted vasogenic edema.  Home dexamethasone increased from 2mg BID to 3mg BID.    Neurovascular checks Q shift.  Follow-up with Neurology in 4-6 weeks as outpatient.    Primary team following.  PT/OT/SLP.  History of stroke  R frontal subacute infarct in 2025.  Continue ASA 81mg daily and Lipitor 40mg daily.  Continue with PT/OT.  Follow-up with Neurovascular in 4-6 weeks as outpatient.    VTE Pharmacologic Prophylaxis:   Pharmacologic: Enoxaparin (Lovenox)  Mechanical VTE Prophylaxis in Place: Yes - sequential compression devices.    Current Length of Stay: 6 day(s)    Current Patient Status: Inpatient Rehab     Discharge Plan: As per primary team.    Code Status: Level 3 - DNAR and DNI    Subjective:   Pt examined while pt sitting in bed in pt room.  Feels that her LUE strength is starting to improve.  Complaints of lower back pain, mild, currently using heating pad with improvement.  Denies any complaints of L shoulder pain today.  Has not been sleeping well.  Sleeping only around 3 hours a night.  Will change evening prednisone dose to an earlier time.  Currently has no other concerns or complaints.    Objective:     Vitals:   Temp (24hrs), Av.8 °F (36.6 °C), Min:97.6 °F (36.4 °C), Max:97.9 °F (36.6 °C)    Temp:  [97.6 °F (36.4 °C)-97.9 °F (36.6 °C)] 97.8 °F (36.6 °C)  HR:   [84-91] 84  Resp:  [18] 18  BP: (130-155)/(66-86) 136/83  SpO2:  [94 %-95 %] 95 %  Body mass index is 21.41 kg/m².     Review of Systems   Constitutional:  Positive for fatigue. Negative for appetite change, chills and fever.   HENT:  Negative for trouble swallowing.    Eyes:  Negative for visual disturbance.   Respiratory:  Negative for cough, shortness of breath, wheezing and stridor.    Cardiovascular:  Negative for chest pain, palpitations and leg swelling.   Gastrointestinal:  Negative for abdominal distention, abdominal pain, constipation, diarrhea, nausea and vomiting.        LBM 5/20   Genitourinary:  Negative for difficulty urinating.   Musculoskeletal:  Negative for arthralgias, back pain and gait problem.   Neurological:  Positive for weakness (LUE weakness, improving). Negative for dizziness, light-headedness, numbness and headaches.   Psychiatric/Behavioral:  Positive for sleep disturbance (sleeping about 3 hours a night). Negative for dysphoric mood. The patient is not nervous/anxious.    All other systems reviewed and are negative.       Input and Output Summary (last 24 hours):       Intake/Output Summary (Last 24 hours) at 5/21/2025 0922  Last data filed at 5/21/2025 0805  Gross per 24 hour   Intake 120 ml   Output --   Net 120 ml       Physical Exam:     Physical Exam  Vitals and nursing note reviewed.   Constitutional:       General: She is not in acute distress.     Appearance: Normal appearance. She is not ill-appearing.   HENT:      Head: Normocephalic and atraumatic.     Cardiovascular:      Rate and Rhythm: Normal rate and regular rhythm.      Pulses: Normal pulses.      Heart sounds: Normal heart sounds. No murmur heard.     No friction rub.   Pulmonary:      Effort: Pulmonary effort is normal. No respiratory distress.      Breath sounds: Normal breath sounds. No wheezing or rhonchi.   Abdominal:      General: Abdomen is flat. Bowel sounds are normal. There is no distension.      Palpations:  Abdomen is soft. There is no mass.      Tenderness: There is no abdominal tenderness. There is no guarding or rebound.      Hernia: No hernia is present.     Musculoskeletal:      Cervical back: Normal range of motion and neck supple. No tenderness.      Right lower leg: No edema.      Left lower leg: No edema.     Skin:     General: Skin is warm and dry.     Neurological:      Mental Status: She is alert and oriented to person, place, and time.      Motor: Weakness (LUE strength 4/5) present.     Psychiatric:         Mood and Affect: Mood normal.         Behavior: Behavior normal.         Additional Data:     Labs:    Results from last 7 days   Lab Units 05/19/25  0523   WBC Thousand/uL 15.97*   HEMOGLOBIN g/dL 10.3*   HEMATOCRIT % 34.1*   PLATELETS Thousands/uL 315   BANDS PCT % 2   LYMPHO PCT % 4*   MONO PCT % 4   EOS PCT % 0     Results from last 7 days   Lab Units 05/19/25  0523 05/16/25  0537   SODIUM mmol/L 140 140   POTASSIUM mmol/L 4.7 4.2   CHLORIDE mmol/L 104 104   CO2 mmol/L 26 26   BUN mg/dL 20 25   CREATININE mg/dL 0.82 0.86   ANION GAP mmol/L 10 10   CALCIUM mg/dL 9.1 9.0   ALBUMIN g/dL  --  3.8   TOTAL BILIRUBIN mg/dL  --  0.23   ALK PHOS U/L  --  53   ALT U/L  --  13   AST U/L  --  11*   GLUCOSE RANDOM mg/dL 111 117         Results from last 7 days   Lab Units 05/14/25  1116   POC GLUCOSE mg/dl 163*               Labs reviewed    Imaging:    Imaging reviewed    Recent Cultures (last 7 days):           Last 24 Hours Medication List:   Current Facility-Administered Medications   Medication Dose Route Frequency Provider Last Rate    acetaminophen  975 mg Oral Daily Cristian Dias MD      And    acetaminophen  650 mg Oral Daily Cristian Dias MD      And    acetaminophen  975 mg Oral Daily Cristian Dias MD      And    acetaminophen  650 mg Oral Daily Cristian Dias MD      aluminum-magnesium hydroxide-simethicone  30 mL Oral Q4H PRN Claudine Leos PA-C      amLODIPine  10 mg Oral Daily Arun  ZULEYMA Jung      aspirin  81 mg Oral Daily Claudine Leos PA-C      atorvastatin  40 mg Oral Daily With Dinner Claudine Leos PA-C      vitamin B-12  1,000 mcg Oral Daily Claudine Leos PA-C      dexamethasone  3 mg Oral BID Claudine Leos PA-C      Diclofenac Sodium  2 g Topical 4x Daily Claudine Leos PA-C      enoxaparin  40 mg Subcutaneous Q24H Claudine Leos PA-C      gabapentin  200 mg Oral Daily Claudine Leos PA-C      And    gabapentin  400 mg Oral HS Claudine Leos PA-C      levETIRAcetam  1,000 mg Oral BID Claudine Leos PA-C      levothyroxine  50 mcg Oral Early Morning Claudine Leos PA-C      lidocaine  1 patch Topical Daily Claudine Leos PA-C      melatonin  3 mg Oral HS Claudine Leos PA-C      methocarbamol  250 mg Oral Q6H PRN Claudine Leos PA-C      pantoprazole  40 mg Oral Daily Before Breakfast Claudine Leos PA-C      QUEtiapine  25 mg Oral HS Cristian Dias MD      senna  1 tablet Oral Daily Claudine Leos PA-C      sulfamethoxazole-trimethoprim  1 tablet Oral Once per day on Monday Wednesday Friday Claudine Leos PA-C          M*Modal software was used to dictate this note.  It may contain errors with dictating incorrect words or incorrect spelling. Please contact the provider directly with any questions.

## 2025-05-21 NOTE — PCC SPEECH THERAPY
Pt currently being followed for cognitive linguistic skills in which currently pt is making steady progress. Upon admission to the unit, pt did complete formalized cognitive assessment, the CLQT+ on initial evaluation with a Composite Severity Rating score of 3.4 out of 4.0, correlating to overall mildly impaired cognitive linguistic impairments at time of evaluation and in comparison to age matched peers ranging from 70-90 y/o. Cognitive barriers which present at this time include: decreased attention, visual attention, ST memory recall , problem solving, reasoning, sequencing, organization of thoughts, judgement, and slower processing. In order to address the noted barriers, skilled SLP services will address this by targeting the following interventions: verbal problem solving task, verbal working memory tasks, visual memory recall tasks, drawing conclusions activities, written sequencing tasks, verbal sequencing tasks, categorization tasks , written financial management tasks, verbal review of current medications, written review of medications, written calendar tasks, tangible scheduling tasks, functional reading tasks, and family education/training. Of note pt does report intermittent word finding deficits in which word deduction and word retrieval strategies are being reviewed.    Current level of functioning:   Comprehension: supervision  Expression: supervision  Social Interaction: mod I  Executive functions: min-mod A   Memory: min-mod A    Family training/education has been initiated with pt's family (2 dtrs Juliana and Sue as well as ex- Sandro) about role of services and targeting tasks which are not only basic review but ensuring consistency in ability to complete I ADL's as pt reported. It is suspected that pt would likely require some level of supervision in regard to I ADL's at discharge to ensure thoroughness and accuracy given finances, scheduling and medications. Recommendations at time of  discharge are for the potential for continued ST services but pending disposition plan and if pt would have increase support/supervision at home.    This week, focus for continued services to target functional executive function skills, including problem solving, reasoning, judgement, sequencing, but also targeting ST memory strategies, as well as I ADL review and initiation of word deduction/retrieval tasks. At this time, pt will continue to benefit from skilled cognitive linguistic tx session to maximize overall functional independence given overall cognitive linguistic skills in attempts to decreased caregiver burden over time.     Update from week: 5/28/2025. Pt is being followed for cognitive linguistic tx sessions to where pt is making steady progress this week.     Continued cognitive barriers which present include: decreased attention, ST memory recall , problem solving, reasoning, sequencing, organization of thoughts, judgement, and slower processing, which still impacts pt's overall safety, functional cognitive communication skills as well as functional mobility. The following interventions are used to target these barriers, including verbal problem solving task, verbal working memory tasks, drawing conclusions activities, written sequencing tasks, verbal sequencing tasks, categorization tasks , verbal reasoning tasks, higher level deductive reasoning activities, written financial management tasks, tangible money tasks, verbal review of current medications, functional reading tasks , and family education/training.       Current level of functioning:   Comprehension: supervision-min A  Expression: supervision  Social Interaction: mod I-supervision  Executive functions: min-mod A  Memory: min A    Family training/education has been ongoing with pt's family. Additionally family meeting was held to where SLP's recommendations were communicated to family for increased supervision and/or assistance given I ADL  tasks at time of discharge. However, since family is not able to provide the increased supervision recommendations made by OT/PT, there will be a plan for transition to subacute level of care for family to increase support needs for pt to return home.  Recommendations at time of discharge are for continued ST services to maximize/preserve cognitive skills.    This week, focus for continued services to target ongoing functional problem solving, reasoning, ST memory tasks w/ review of strategies to carryover, deduction tasks, higher level word finding tasks. At this time, pt will continue to benefit from skilled cognitive linguistic tx session to maximize overall functional independence given overall cognitive linguistic skills in attempts to decreased caregiver burden over time.

## 2025-05-21 NOTE — CASE MANAGEMENT
CM called patient's daughter Juliana, reviewed team meeting recommendations from team meeting, and requested to schedule a family meeting , to discuss discharge plan and disposition. Juliana was in communication with Sue, her sister, and they decided for a meeting tomorrow 5/22 at 10:00. Juliana stated she will notify her father, patient's ex- ,and he will be at the meeting also. Nursing,therapist, and physician made aware of scheduled meeting, and information placed on the therapy board. CM will continue to follow.

## 2025-05-21 NOTE — PROGRESS NOTES
Progress Note - PMR   Name: Ayla Nye 74 y.o. 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 Treatments complicated by toxicities and infection.   Most recently receiving Taoxtere with plans to start chemotherapy after discharge   MRI brain showed stable enhancing lesions within the L superior occipital lobe and L parietal lobe with surrounding vasogenic edema, stable foci of restricted diffusion within the R posterior frontal lobe most likely sequela of subacute ischemia.  CT CAP showed multiple lesions with increase in size.    Plan:   Continue increased dose of dexamethasone 3mg BID.  Continue Bactrim -160mg MWF  Continue Keppra 1000mg BID  See pain insertion   Follows with Dr. Castro (Oncology), Dr Vuong (Radiation/Oncology), and Palliative as outpatient  Monitor for new or worsening pain, decreased ROM,  changes in strength, sensation, balance, and mentation,increased fatigue, SOB, edema, abdominal pain, nausea, vomiting, constipation, wt loss, worsening intake/appetite   HTN (hypertension)  Continue home amlodipine 5mg daily  Monitor BP trends   Insomnia  Continue melatonin 3mg qHS, Seroquel 25 mg qHs (last increased 5/17), nighttime dose of gabapentin increased on 5/20  Decadron dosing modified so second dose is given earlier   Encourage sleep hygiene (routine that promotes healthy circadian rhythm,avoid caffeine later in the day, quiet/dark room at night, reduce electronic before bedtime)  Hypothyroidism  Continue home levothyroxine 50mcg daily  Cancer associated pain  Current pain regimen: Scheduled Tylenol, gabapentin daily and qHS,, Lidoderm, and PRN Robaxin   Continue to monitor pain levels and adjust as needed   Anemia  Hgb currently stable at 10.3.  Baseline  9-10.  Continue home cyanocobalamin 1000mcg daily.  Continue to monitor CBC  Leukocytosis  Recently within 10-15 range   Likely steroid induced   Continue to monitor CBC and signs/symptoms of infection   History of pulmonary embolism  Most recent CTA chest in March 2025 negative for PE   Previously on Xarelto but discontinued due to new foci of hemorrhage within the L occipital metastasis (April 2025)   Monitor respiratory status   History of Pneumocystis jirovecii pneumonia  Continue Bactrim 3x weekly   GERD (gastroesophageal reflux disease)  Continue PTA Protonix 40mg daily  History of stroke  Right frontal subacute infarct diagnosed on 04/2025  Residual left hemiparesis   Continue ASA 81mg daily and Lipitor 40mg daily.  Continue with PT/OT  Follow-up with Neurovascular in 4-6 weeks as outpatient.  Fall  Presented on 5/12/25 after experiencing mechanical fall with head strike.  Hx of L sided weakness s/p stroke in 04/2025.  CTH showed vasogenic edema L parietal vertex compatible with known brain metastasis, no hemorrhage identified.  MRI brain showed stable enhancing lesions within the L superior occipital lobe and L parietal lobe with surrounding vasogenic edema, stable foci of restricted diffusion within the R posterior frontal lobe most likely sequela of subacute ischemia.  Per Neurology, symptoms likely due to some increase in size of CNS metastasis on top of noted vasogenic edema.  Home dexamethasone increased from 2mg BID to 3mg BID  Impaired mobility and ADLs  Patient was evaluated by the rehabilitation team MD and an appropriate candidate for acute inpatient rehabilitation program at this time.  The patient will tolerate 3 hours/day 5 to 7 days/week of intensive physical, speech and occupational therapy   in order to obtain goals for community discharge  Due to the patient's functional Compared to their baseline level of function in addition to their ongoing medical needs, the patient would benefit from  daily supervision from a rehabilitation physician as well as rehabilitation nursing to implement and adjust the medical as well as functional plan of care in order to meet the patient's goals.  Bladder: Monitor closely for urinary incontinence and/or retention. Monitor urine output and encourage spontaneous voids. If unable to void spontaneously, will monitor with PVR bladder scans and initiate CIC regimen.  Skin: Monitor for breakdown, frequent turns, pressure offloading  Bowel: Monitor closely for constipation and/or incontinence. Will follow BMs and adjust bowel regimen to target soft, formed stools q1-2 days, per pt's regular schedule  Discharge date TBD, pending interdisciplinary meeting     At risk for venous thromboembolism (VTE)  Continue Lovenox while inpatient     Subjective   Patient is a 74 year-old female with a past medical history of stage IV adenocarcinoma of the lung metastatic to brain, history of ischemic stroke in April 2025, GERD, hyperlipidemia, hypertension, history of pulmonary embolism, presenting to [facility] due to a progressive cancer metastases, recent fall, recent stroke and debility. She was admitted from 5/5 to 5/7 due to left-sided hemiparesis as a result of right MCA territory frontal lobe stroke. Xarelto was discontinued in April 2025 after new foci of hemorrhage within the left occipital metastasis She presented to the ED again on 5/12 after a fall with headstrike. Imaging did not show an acute traumatic injury, though did show progression of metastases. Palliative care, neurology, and heme onc consulted. She was evaluated by PT and OT and deemed an appropiate candidate for [facility] due to functional deficits.     Chief Complaint: f/u ambulatory dysfunction and increased weakness    Interval: Seen and evaluated patient in room. She denies any acute concerns, is having mild, unchanged back pain. She continues to endorse disrupted sleep, which likely due to increased dose of steroid (dosing  schedule adjusted today).  Last BM 5/20, continent of bowel and bladder.     Objective :  Temp:  [97.6 °F (36.4 °C)-97.9 °F (36.6 °C)] 97.8 °F (36.6 °C)  HR:  [84-91] 88  BP: (130-155)/(66-86) 136/83  Resp:  [18] 18  SpO2:  [94 %-95 %] 95 %  O2 Device: None (Room air)    Functional Update:  Physical Therapy Occupational Therapy Speech Therapy   Weight Bearing Status: Full Weight Bearing  Transfers: Minimal Assistance  Bed Mobility: Incidental Touching  Amulation Distance (ft): 90 feet  Ambulation: Assist of 2  Assistive Device for Ambulation: Roller Walker  Roller Walker Attachments:  (L hand scoop)  Number of Stairs: 4  Assistive Device for Stairs: Bilateral Hand Rails  Stair Assistance: Minimal Assistance  Ramp: Moderate Assistance  Assistive Device for Ramp: Hand Hold Assistance  Discharge Recommendations: Home with:  DC Home with:: First Floor Setup, Family Support, Home Physical Therapy (TBD pending needs and support at [place])   Eating: Supervision (s/u assist)  Grooming: Supervision  Bathing: Moderate Assistance  Bathing: Moderate Assistance  Upper Body Dressing: Moderate Assistance  Lower Body Dressing: Moderate Assistance (footwear CG/Myla)  Toileting: Minimal Assistance  Tub/Shower Transfer: Moderate Assistance  Toilet Transfer: Minimal Assistance  Cognition: Exceptions to WNL  Cognition: Decreased Attention, Decreased Safety  Orientation: Person, Place, Time, Situation   Mode of Communication: Verbal  Cognition: Exceptions to WNL  Cognition: Decreased Memory, Decreased Executive Functions, Decreased Attention, Decreased Comprehension  Orientation: Person, Place, Time, Situation  Discharge Recommendations: Home with:  DC Home with:: 24 Hour Supervision, Family Support (ST services pending progress)         Physical Exam  Vitals and nursing note reviewed.   Constitutional:       General: She is not in acute distress.     Appearance: She is not toxic-appearing or diaphoretic.      Comments: Appears comfortable  resting in bed    HENT:      Head: Normocephalic and atraumatic.      Mouth/Throat:      Mouth: Mucous membranes are moist.   Pulmonary:      Effort: Pulmonary effort is normal. No respiratory distress.     Musculoskeletal:      Right lower leg: No edema.      Left lower leg: No edema.     Neurological:      Mental Status: She is alert.      Comments: Alert and appropriate to questions    Psychiatric:         Mood and Affect: Mood normal.         Behavior: Behavior normal.         Scheduled Meds:  Current Facility-Administered Medications   Medication Dose Route Frequency Provider Last Rate    acetaminophen  975 mg Oral Daily Cristian Dias MD      And    acetaminophen  650 mg Oral Daily Cristian Dias MD      And    acetaminophen  975 mg Oral Daily Cristian Dias MD      And    acetaminophen  650 mg Oral Daily Cristian Dias MD      aluminum-magnesium hydroxide-simethicone  30 mL Oral Q4H PRN Claudine Leos PA-C      amLODIPine  10 mg Oral Daily ZULEYMA Viramontes      aspirin  81 mg Oral Daily Claudine Leos PA-C      atorvastatin  40 mg Oral Daily With Dinner Claudine Leos PA-C      vitamin B-12  1,000 mcg Oral Daily Claudine Leos PA-C      dexamethasone  3 mg Oral BID Cristian Dias MD      Diclofenac Sodium  2 g Topical 4x Daily Claudine Leos PA-C      enoxaparin  40 mg Subcutaneous Q24H Claudine Leos PA-C      gabapentin  200 mg Oral Daily Claudine Leos PA-C      And    gabapentin  400 mg Oral HS Claudine Leos PA-C      levETIRAcetam  1,000 mg Oral BID Claudine Leos PA-C      levothyroxine  50 mcg Oral Early Morning Claudine Leos PA-C      lidocaine  1 patch Topical Daily Claudine Leos PA-C      melatonin  3 mg Oral HS Claudine Leos PA-C      methocarbamol  250 mg Oral Q6H PRN Claudine Leos PA-C      pantoprazole  40 mg Oral Daily Before Breakfast Claudine Leos PA-C      QUEtiapine  25 mg Oral HS Cristian Dias MD      senna  1 tablet Oral Daily Claudine Leos  ZAC      sulfamethoxazole-trimethoprim  1 tablet Oral Once per day on Monday Wednesday Friday Claudine Leos PA-C           Lab Results: I have reviewed the following results:  Results from last 7 days   Lab Units 05/19/25  0523 05/16/25  0537 05/15/25  0510   HEMOGLOBIN g/dL 10.3* 10.1* 9.4*   HEMATOCRIT % 34.1* 34.0* 31.2*   WBC Thousand/uL 15.97* 14.84* 14.75*   PLATELETS Thousands/uL 315 333 318     Results from last 7 days   Lab Units 05/19/25  0523 05/16/25  0537   BUN mg/dL 20 25   SODIUM mmol/L 140 140   POTASSIUM mmol/L 4.7 4.2   CHLORIDE mmol/L 104 104   CREATININE mg/dL 0.82 0.86   AST U/L  --  11*   ALT U/L  --  13            Claudine Leos PA-C  Physical Medicine and Rehabilitation  [facility]

## 2025-05-21 NOTE — ASSESSMENT & PLAN NOTE
Continue melatonin 3mg qHS, Seroquel 25 mg qHs (last increased 5/17), nighttime dose of gabapentin increased on 5/20  Decadron dosing modified so second dose is given earlier   Encourage sleep hygiene (routine that promotes healthy circadian rhythm,avoid caffeine later in the day, quiet/dark room at night, reduce electronic before bedtime)

## 2025-05-21 NOTE — PLAN OF CARE
Problem: PAIN - ADULT  Goal: Verbalizes/displays adequate comfort level or baseline comfort level  Description: Interventions:  - Encourage patient to monitor pain and request assistance  - Assess pain using appropriate pain scale  - Administer analgesics as ordered based on type and severity of pain and evaluate response  - Implement non-pharmacological measures as appropriate and evaluate response  - Consider cultural and social influences on pain and pain management  - Notify physician/advanced practitioner if interventions unsuccessful or patient reports new pain  - Educate patient/family on pain management process including their role and importance of  reporting pain   - Provide non-pharmacologic/complimentary pain relief interventions  Outcome: Progressing     Problem: MOBILITY - ADULT  Goal: Maintain or return to baseline ADL function  Description: INTERVENTIONS:  -  Assess patient's ability to carry out ADLs; assess patient's baseline for ADL function and identify physical deficits which impact ability to perform ADLs (bathing, care of mouth/teeth, toileting, grooming, dressing, etc.)  - Assess/evaluate cause of self-care deficits   - Assess range of motion  - Assess patient's mobility; develop plan if impaired  - Assess patient's need for assistive devices and provide as appropriate  - Encourage maximum independence but intervene and supervise when necessary  - Involve family in performance of ADLs  - Assess for home care needs following discharge   - Provide patient education as appropriate  - Monitor gait, balance and fatigue with ambulation    Outcome: Progressing

## 2025-05-21 NOTE — PROGRESS NOTES
05/21/25 0983   Pain Assessment   Pain Assessment Tool 0-10   Pain Score No Pain   Restrictions/Precautions   Precautions Bed/chair alarms;Cognitive;Fall Risk;Pain;Supervision on toilet/commode;Visual deficit   Comprehension   Comprehension (FIM) 5 - Understands basic directions and conversation   Expression   Expression (FIM) 5 - Needs help/cues only RARELY (< 10% of the time)   Social Interaction   Social Interaction (FIM) 6 - Interacts appropriately with others BUT requires extra  time   Problem Solving   Problem solving (FIM) 4 - Solves basic problems 75-89% of time   Memory   Memory (FIM) 4 - Recognizes/recalls/performs 75-89%   Speech/Language/Cognition Assessmetn   Treatment Assessment Pt was awake, alert and engaged for session today in which pt's dtrSue was present for session today. SLP reviewing overnight events to where pt continues to report still poor sleep given taking steroids later in evening which is a side effect to keep pt awake. SLP and pt did discuss this in yesterday's session about talking to MD about the potential for changing the time which evening steroid is provided. She did not do this yesterday, so SLP reminded pt to do this today and SLP will mention to MD as well about the possibility for changes in times for steroids as team meeting is held today. While pt's recall of certain things given daily does appear to be good, there is still deficit given more fine details to other pertinent information which could help her care over time.     Otherwise, SLP engaged pt in higher level drawing conclusions task in which pt was to identify activities or places from a more generalized/vague descriptions. It was noted that pt was able to independently ID activities/places from descriptions was 11/14 accuracy. Pt did require repetition x1 given 1 items which pt was able to quickly ID increasing to 12/14. However, the last 2 items, increased repetition of the information in addition to minimal  increased semantic cues to determine last 2 descriptions. Next task provided was a higher level category matrix task in which pt was provided 4 common categories but then given 4 different initial letters to determine an object which belonged per category. It was noted given the first round pt was 15/16 accurate, benefiting from increased time to determine the last item to be 16/16 accuracy. For the 2nd round of 4 different categories w/ an additional 4 different initial letters, pt was able to be 14/16 accurate noting difficulty in thinking of 2 items for the last initial letter and 2 categories. However, when allowing increased time, pt was able to determine good fits to the category to increase to 16/16 accuracy. At this time, pt will continue to benefit from ongoing skilled SLP services to maximize functional cognitive and word finding skills in hopes for decreasing need for caregiver burden over time.    SLP Therapy Minutes   SLP Time In 0930   SLP Time Out 1000   SLP Total Time (minutes) 30   SLP Mode of treatment - Individual (minutes) 30   SLP Mode of treatment - Concurrent (minutes) 0   SLP Mode of treatment - Group (minutes) 0   SLP Mode of treatment - Co-treat (minutes) 0   SLP Mode of Treatment - Total time(minutes) 30 minutes   SLP Cumulative Minutes 195   Therapy Time missed   Time missed? No

## 2025-05-22 LAB
ANION GAP SERPL CALCULATED.3IONS-SCNC: 10 MMOL/L (ref 4–13)
ANISOCYTOSIS BLD QL SMEAR: PRESENT
BASOPHILS # BLD MANUAL: 0.15 THOUSAND/UL (ref 0–0.1)
BASOPHILS NFR MAR MANUAL: 1 % (ref 0–1)
BUN SERPL-MCNC: 29 MG/DL (ref 5–25)
CALCIUM SERPL-MCNC: 8.9 MG/DL (ref 8.4–10.2)
CHLORIDE SERPL-SCNC: 103 MMOL/L (ref 96–108)
CO2 SERPL-SCNC: 26 MMOL/L (ref 21–32)
CREAT SERPL-MCNC: 0.8 MG/DL (ref 0.6–1.3)
EOSINOPHIL # BLD MANUAL: 0 THOUSAND/UL (ref 0–0.4)
EOSINOPHIL NFR BLD MANUAL: 0 % (ref 0–6)
ERYTHROCYTE [DISTWIDTH] IN BLOOD BY AUTOMATED COUNT: 16.6 % (ref 11.6–15.1)
GFR SERPL CREATININE-BSD FRML MDRD: 72 ML/MIN/1.73SQ M
GLUCOSE P FAST SERPL-MCNC: 108 MG/DL (ref 65–99)
GLUCOSE SERPL-MCNC: 108 MG/DL (ref 65–140)
HCT VFR BLD AUTO: 31.5 % (ref 34.8–46.1)
HGB BLD-MCNC: 9.3 G/DL (ref 11.5–15.4)
LYMPHOCYTES # BLD AUTO: 0.44 THOUSAND/UL (ref 0.6–4.47)
LYMPHOCYTES # BLD AUTO: 3 % (ref 14–44)
MCH RBC QN AUTO: 27.2 PG (ref 26.8–34.3)
MCHC RBC AUTO-ENTMCNC: 29.5 G/DL (ref 31.4–37.4)
MCV RBC AUTO: 92 FL (ref 82–98)
MONOCYTES # BLD AUTO: 0.73 THOUSAND/UL (ref 0–1.22)
MONOCYTES NFR BLD: 5 % (ref 4–12)
MYELOCYTE ABSOLUTE CT: 0.44 THOUSAND/UL (ref 0–0.1)
MYELOCYTES NFR BLD MANUAL: 3 % (ref 0–1)
NEUTROPHILS # BLD MANUAL: 12.87 THOUSAND/UL (ref 1.85–7.62)
NEUTS BAND NFR BLD MANUAL: 2 % (ref 0–8)
NEUTS HYPERSEG BLD QL SMEAR: PRESENT
NEUTS SEG NFR BLD AUTO: 86 % (ref 43–75)
OVALOCYTES BLD QL SMEAR: PRESENT
PLATELET # BLD AUTO: 289 THOUSANDS/UL (ref 149–390)
PLATELET BLD QL SMEAR: ADEQUATE
PMV BLD AUTO: 9.3 FL (ref 8.9–12.7)
POTASSIUM SERPL-SCNC: 4.3 MMOL/L (ref 3.5–5.3)
RBC # BLD AUTO: 3.42 MILLION/UL (ref 3.81–5.12)
RBC MORPH BLD: PRESENT
SODIUM SERPL-SCNC: 139 MMOL/L (ref 135–147)
WBC # BLD AUTO: 14.62 THOUSAND/UL (ref 4.31–10.16)

## 2025-05-22 PROCEDURE — 97530 THERAPEUTIC ACTIVITIES: CPT

## 2025-05-22 PROCEDURE — 80048 BASIC METABOLIC PNL TOTAL CA: CPT | Performed by: NURSE PRACTITIONER

## 2025-05-22 PROCEDURE — 99232 SBSQ HOSP IP/OBS MODERATE 35: CPT | Performed by: INTERNAL MEDICINE

## 2025-05-22 PROCEDURE — 99233 SBSQ HOSP IP/OBS HIGH 50: CPT | Performed by: PHYSICAL MEDICINE & REHABILITATION

## 2025-05-22 PROCEDURE — 97116 GAIT TRAINING THERAPY: CPT

## 2025-05-22 PROCEDURE — 85027 COMPLETE CBC AUTOMATED: CPT | Performed by: NURSE PRACTITIONER

## 2025-05-22 PROCEDURE — 85007 BL SMEAR W/DIFF WBC COUNT: CPT | Performed by: NURSE PRACTITIONER

## 2025-05-22 RX ORDER — QUETIAPINE FUMARATE 25 MG/1
37.5 TABLET, FILM COATED ORAL
Status: DISCONTINUED | OUTPATIENT
Start: 2025-05-22 | End: 2025-05-27

## 2025-05-22 RX ORDER — LISINOPRIL 5 MG/1
5 TABLET ORAL DAILY
Status: DISCONTINUED | OUTPATIENT
Start: 2025-05-22 | End: 2025-05-28

## 2025-05-22 RX ADMIN — ACETAMINOPHEN 650 MG: 325 TABLET, FILM COATED ORAL at 23:25

## 2025-05-22 RX ADMIN — LEVETIRACETAM 1000 MG: 500 TABLET, FILM COATED ORAL at 08:05

## 2025-05-22 RX ADMIN — GABAPENTIN 200 MG: 100 CAPSULE ORAL at 08:05

## 2025-05-22 RX ADMIN — LEVETIRACETAM 1000 MG: 500 TABLET, FILM COATED ORAL at 21:12

## 2025-05-22 RX ADMIN — ACETAMINOPHEN 650 MG: 325 TABLET, FILM COATED ORAL at 12:02

## 2025-05-22 RX ADMIN — PANTOPRAZOLE SODIUM 40 MG: 40 TABLET, DELAYED RELEASE ORAL at 06:00

## 2025-05-22 RX ADMIN — AMLODIPINE BESYLATE 10 MG: 10 TABLET ORAL at 08:05

## 2025-05-22 RX ADMIN — DICLOFENAC SODIUM 2 G: 10 GEL TOPICAL at 17:07

## 2025-05-22 RX ADMIN — DICLOFENAC SODIUM 2 G: 10 GEL TOPICAL at 08:05

## 2025-05-22 RX ADMIN — Medication 6 MG: at 21:07

## 2025-05-22 RX ADMIN — LEVOTHYROXINE SODIUM 50 MCG: 0.05 TABLET ORAL at 05:37

## 2025-05-22 RX ADMIN — DICLOFENAC SODIUM 2 G: 10 GEL TOPICAL at 12:02

## 2025-05-22 RX ADMIN — ACETAMINOPHEN 975 MG: 325 TABLET, FILM COATED ORAL at 06:33

## 2025-05-22 RX ADMIN — ALUMINUM HYDROXIDE, MAGNESIUM HYDROXIDE, AND DIMETHICONE 30 ML: 200; 20; 200 SUSPENSION ORAL at 21:13

## 2025-05-22 RX ADMIN — DEXAMETHASONE 3 MG: 0.5 TABLET ORAL at 08:05

## 2025-05-22 RX ADMIN — LISINOPRIL 5 MG: 5 TABLET ORAL at 09:50

## 2025-05-22 RX ADMIN — SENNOSIDES 8.6 MG: 8.6 TABLET, FILM COATED ORAL at 08:05

## 2025-05-22 RX ADMIN — METHOCARBAMOL TABLETS 250 MG: 500 TABLET, COATED ORAL at 21:09

## 2025-05-22 RX ADMIN — DEXAMETHASONE 3 MG: 0.5 TABLET ORAL at 14:48

## 2025-05-22 RX ADMIN — GABAPENTIN 400 MG: 400 CAPSULE ORAL at 21:10

## 2025-05-22 RX ADMIN — CYANOCOBALAMIN TAB 1000 MCG 1000 MCG: 1000 TAB at 08:05

## 2025-05-22 RX ADMIN — ATORVASTATIN CALCIUM 40 MG: 40 TABLET, FILM COATED ORAL at 17:06

## 2025-05-22 RX ADMIN — ENOXAPARIN SODIUM 40 MG: 40 INJECTION SUBCUTANEOUS at 13:04

## 2025-05-22 RX ADMIN — QUETIAPINE FUMARATE 37.5 MG: 25 TABLET ORAL at 21:11

## 2025-05-22 RX ADMIN — ACETAMINOPHEN 975 MG: 325 TABLET, FILM COATED ORAL at 17:06

## 2025-05-22 RX ADMIN — ASPIRIN 81 MG CHEWABLE TABLET 81 MG: 81 TABLET CHEWABLE at 08:05

## 2025-05-22 NOTE — ASSESSMENT & PLAN NOTE
Hgb currently stable at 9.3.  Baseline 9-10.  Continue home cyanocobalamin 1000mcg daily.  Asymptomatic.  Continue to trend routine CBC.

## 2025-05-22 NOTE — PROGRESS NOTES
Progress Note - Internal Medicine   Name: Ayla Nye 74 y.o. 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  Hx of radiation in 08/2024 and 01/2025.  Hx of hold on treatments due to multiple admission/toxicities/PCP pneumonia/PE.  Most recently receiving Taoxtere with plans to start chemotherapy after admission.    MRI brain showed stable enhancing lesions within the L superior occipital lobe and L parietal lobe with surrounding vasogenic edema, stable foci of restricted diffusion within the R posterior frontal lobe most likely sequela of subacute ischemia.  CT CAP showed multiple lesions with increase in size.    Continue increased dose of dexamethasone 3mg BID.  Continue Bactrim -160mg MWF.  Continue Keppra 1000mg BID.    Follows with Dr. Castro (Oncology), Dr Vuong (Radiation/Oncology), and Palliative as outpatient.  Follow-up with Neurology in 4-6 weeks as outpatient.  HTN (hypertension)  Home regimen: amlodipine 5mg daily.  Continue amlodipine 10mg daily.  Start lisinopril 5mg daily today.  Monitor BP with routine VS.  Insomnia  Continue melatonin 3mg and Seroquel 25mg at HS.  Hypothyroidism  Continue home levothyroxine 50mcg daily.  TSH 0.408 and T4 0.79 on 5/12.  Recommend repeat TSH in 6 weeks as outpatient.  Anemia  Hgb currently stable at 9.3.  Baseline 9-10.  Continue home cyanocobalamin 1000mcg daily.  Asymptomatic.  Continue to trend routine CBC.  Leukocytosis  WBC count currently stable at 14.62.  No active s/s of infection.  Likely steroid induced.  Continue to trend routine CBC.  History of pulmonary embolism  Most recent CTA chest/PE study in 03/2025 showed no PE.  Had been on Xarelto in the past but discontinued due to new foci of hemorrhage within the L occipital metastasis.  Monitor for s/s of  reoccurrence.  GERD (gastroesophageal reflux disease)  Continue home Protonix 40mg daily.  Stroke-like symptoms  Presented on 25 after experiencing mechanical fall with head strike.  Hx of L sided weakness s/p stroke in 2025.  CTH showed vasogenic edema L parietal vertex compatible with known brain metastasis, no hemorrhage identified.  MRI brain showed stable enhancing lesions within the L superior occipital lobe and L parietal lobe with surrounding vasogenic edema, stable foci of restricted diffusion within the R posterior frontal lobe most likely sequela of subacute ischemia.    Per Neurology - symptoms likely due to some increase in size of CNS metastasis on top of noted vasogenic edema.  Home dexamethasone increased from 2mg BID to 3mg BID.    Neurovascular checks Q shift.  Follow-up with Neurology in 4-6 weeks as outpatient.    Primary team following.  PT/OT/SLP.  History of stroke  R frontal subacute infarct in 2025.  Continue ASA 81mg daily and Lipitor 40mg daily.  Continue with PT/OT.  Follow-up with Neurovascular in 4-6 weeks as outpatient.    VTE Pharmacologic Prophylaxis:   Pharmacologic: Enoxaparin (Lovenox)  Mechanical VTE Prophylaxis in Place: Yes - sequential compression devices.    Current Length of Stay: 7 day(s)    Current Patient Status: Inpatient Rehab     Discharge Plan: As per primary team.    Code Status: Level 3 - DNAR and DNI    Subjective:   Pt examined while pt lying in bed in pt room.  Feeling very fatigued.  States that she did not participate in therapies this evening due to her fatigue.  Only sleeping about 3 hours a night and asking to have her sleep regimen changed.  Mild lower back pain currently, improving with heating pad.  Did become lightheaded once this morning when first getting OOB, but improved within a few seconds.  Currently has no other concerns or complaints.    Objective:     Vitals:   Temp (24hrs), Av °F (36.7 °C), Min:97.5 °F (36.4 °C), Max:98.5 °F  (36.9 °C)    Temp:  [97.5 °F (36.4 °C)-98.5 °F (36.9 °C)] 97.5 °F (36.4 °C)  HR:  [82-95] 82  Resp:  [16-18] 18  BP: (123-148)/(81-86) 127/81  SpO2:  [92 %-94 %] 94 %  Body mass index is 21.41 kg/m².     Review of Systems   Constitutional:  Positive for fatigue. Negative for appetite change, chills and fever.   HENT:  Negative for trouble swallowing.    Eyes:  Negative for visual disturbance.   Respiratory:  Negative for cough, shortness of breath, wheezing and stridor.    Cardiovascular:  Negative for chest pain, palpitations and leg swelling.   Gastrointestinal:  Negative for abdominal distention, abdominal pain, constipation, diarrhea, nausea and vomiting.        LBM 5/21   Genitourinary:  Negative for difficulty urinating.   Musculoskeletal:  Positive for back pain (mild lower back pain, aching, chronic, improves with heating pad). Negative for arthralgias and gait problem.   Neurological:  Positive for light-headedness (occurred briefly this morning when getting OOB). Negative for dizziness, weakness, numbness and headaches.   Psychiatric/Behavioral:  Positive for sleep disturbance (only sleeping 3 hours a night). Negative for dysphoric mood. The patient is not nervous/anxious.    All other systems reviewed and are negative.       Input and Output Summary (last 24 hours):       Intake/Output Summary (Last 24 hours) at 5/22/2025 0959  Last data filed at 5/22/2025 0900  Gross per 24 hour   Intake 780 ml   Output --   Net 780 ml       Physical Exam:     Physical Exam  Vitals and nursing note reviewed.   Constitutional:       General: She is not in acute distress.     Appearance: Normal appearance. She is not ill-appearing.   HENT:      Head: Normocephalic and atraumatic.     Cardiovascular:      Rate and Rhythm: Normal rate and regular rhythm.      Pulses: Normal pulses.      Heart sounds: Murmur heard.      Systolic murmur is present with a grade of 1/6.      No friction rub.   Pulmonary:      Effort: Pulmonary  effort is normal. No respiratory distress.      Breath sounds: Normal breath sounds. No wheezing or rhonchi.   Abdominal:      General: Abdomen is flat. Bowel sounds are normal. There is no distension.      Palpations: Abdomen is soft. There is no mass.      Tenderness: There is no abdominal tenderness. There is no guarding or rebound.      Hernia: No hernia is present.     Musculoskeletal:      Cervical back: Normal range of motion and neck supple. No tenderness.      Right lower leg: No edema.      Left lower leg: No edema.     Skin:     General: Skin is warm and dry.      Capillary Refill: Capillary refill takes less than 2 seconds.     Neurological:      Mental Status: She is alert and oriented to person, place, and time.     Psychiatric:         Mood and Affect: Mood normal.         Behavior: Behavior normal.         Additional Data:     Labs:    Results from last 7 days   Lab Units 05/22/25  0538   WBC Thousand/uL 14.62*   HEMOGLOBIN g/dL 9.3*   HEMATOCRIT % 31.5*   PLATELETS Thousands/uL 289   BANDS PCT % 2   LYMPHO PCT % 3*   MONO PCT % 5   EOS PCT % 0     Results from last 7 days   Lab Units 05/22/25  0538 05/19/25  0523 05/16/25  0537   SODIUM mmol/L 139   < > 140   POTASSIUM mmol/L 4.3   < > 4.2   CHLORIDE mmol/L 103   < > 104   CO2 mmol/L 26   < > 26   BUN mg/dL 29*   < > 25   CREATININE mg/dL 0.80   < > 0.86   ANION GAP mmol/L 10   < > 10   CALCIUM mg/dL 8.9   < > 9.0   ALBUMIN g/dL  --   --  3.8   TOTAL BILIRUBIN mg/dL  --   --  0.23   ALK PHOS U/L  --   --  53   ALT U/L  --   --  13   AST U/L  --   --  11*   GLUCOSE RANDOM mg/dL 108   < > 117    < > = values in this interval not displayed.                       Labs reviewed    Imaging:    Imaging reviewed    Recent Cultures (last 7 days):           Last 24 Hours Medication List:   Current Facility-Administered Medications   Medication Dose Route Frequency Provider Last Rate    acetaminophen  975 mg Oral Daily Cristian Dias MD      And     acetaminophen  650 mg Oral Daily Cristian iDas MD      And    acetaminophen  975 mg Oral Daily Cristian Dias MD      And    acetaminophen  650 mg Oral Daily Cristian Dias MD      aluminum-magnesium hydroxide-simethicone  30 mL Oral Q4H PRN Claudine Leos PA-C      amLODIPine  10 mg Oral Daily ZULEYMA Viramontes      aspirin  81 mg Oral Daily Claudine Leos PA-C      atorvastatin  40 mg Oral Daily With Dinner Claudine Leos PA-C      vitamin B-12  1,000 mcg Oral Daily Claudine Leos PA-C      dexamethasone  3 mg Oral BID Cristian Dias MD      Diclofenac Sodium  2 g Topical 4x Daily Claudine Leos PA-C      enoxaparin  40 mg Subcutaneous Q24H Claudine Leos PA-C      gabapentin  200 mg Oral Daily Claudine Leos PA-C      And    gabapentin  400 mg Oral HS Claudine Leos PA-C      levETIRAcetam  1,000 mg Oral BID Claudine Leos PA-C      levothyroxine  50 mcg Oral Early Morning Claudine Leos PA-C      lidocaine  1 patch Topical Daily Claudine Leos PA-C      lisinopril  5 mg Oral Daily ZULEYMA Viramontes      melatonin  3 mg Oral HS Claudine Leos PA-C      methocarbamol  250 mg Oral Q6H PRN Claudine Leos PA-C      pantoprazole  40 mg Oral Daily Before Breakfast Claudine Leos PA-C      QUEtiapine  25 mg Oral HS Cristian Dias MD      senna  1 tablet Oral Daily Claudine Leos PA-C      sulfamethoxazole-trimethoprim  1 tablet Oral Once per day on Monday Wednesday Friday Claudine Leos PA-C          M*Modal software was used to dictate this note.  It may contain errors with dictating incorrect words or incorrect spelling. Please contact the provider directly with any questions.

## 2025-05-22 NOTE — ASSESSMENT & PLAN NOTE
Home regimen: amlodipine 5mg daily.  Continue amlodipine 10mg daily.  Start lisinopril 5mg daily today.  Monitor BP with routine VS.

## 2025-05-22 NOTE — CASE MANAGEMENT
Family meeting today at 10:00 am with daughters Yulia, ex  Sandro on speaker phone. Present from staff were Dr. Cristian Dias, Nakia from OT, Ela from PT, and this CM. Dr. Dias gave medical updates, and answered patient and daughters questions. Nakia and Ela gave therapy updates with patient's progress and functioning. Discussion including patient going to subacute rehab from here at the [facility], while daughters will investigate getting [facility]HA to patient's home for 24/7 care. It was also discussed the possibility of selling patient's home, and have her go to assisted living. [employer] contact information given to daughters, with explanation of their assistance in making transitions from home to assisted or personal care facilities. CM gave patient and daughters SNF list, explained the CMS ratings,with explanation that it may be updated on medicare .gov. Patient and daughters chose 13 facilities;[facility] [place],[facility], [facility] Nilay Pagan Mosser, Yanna Ch.Cm sent referrals to 13 above facilities through [employer] via [employer]. Family completed general snf application, and it was uploaded into [employer], Clinical information also uploaded into [employer], awaiting responses.

## 2025-05-22 NOTE — PROGRESS NOTES
"   05/22/25 0891   Pain Assessment   Pain Assessment Tool 0-10   Pain Score 3   Pain Location/Orientation Orientation: Right;Orientation: Lower;Location: Back   Pain Onset/Description Onset: Ongoing;Descriptor: Sore   Effect of Pain on Daily Activities chronic   Restrictions/Precautions   Precautions Bed/chair alarms;Cognitive;Fall Risk;Supervision on toilet/commode;Visual deficit   Cognition   Overall Cognitive Status Impaired   Arousal/Participation Alert;Cooperative   Attention Attends with cues to redirect   Orientation Level Oriented X4   Memory Decreased short term memory;Decreased recall of precautions   Following Commands Follows one step commands with increased time or repetition   Subjective   Subjective \"I didnt sleep well, my arm got stuck\"   Sit to Stand   Type of Assistance Needed Physical assistance   Physical Assistance Level 25% or less   Comment CG/min A cues  and A for hand placement (L UE )   Sit to Stand CARE Score 3   Bed-Chair Transfer   Type of Assistance Needed Physical assistance   Physical Assistance Level 26%-50%   Comment min A to strong side sit pivot, mod A to L sit pivot no AD vs stand pivot with RW ( L hand scoop)   Chair/Bed-to-Chair Transfer CARE Score 3   Walk 10 Feet   Type of Assistance Needed Physical assistance   Physical Assistance Level 26%-50%   Comment RW with L hand scoop   Walk 10 Feet CARE Score 3   Walk 50 Feet with Two Turns   Type of Assistance Needed Physical assistance   Physical Assistance Level 26%-50%   Comment RW with L hand scoop   Walk 50 Feet with Two Turns CARE Score 3   Ambulation   Primary Mode of Locomotion Prior to Admission Walk   Distance Walked (feet) 90 ft  (4x20ft)   Assist Device Roller Walker  (L hand scoop)   Gait Pattern Inconsistant Aparna;Slow Aparna;Decreased foot clearance;L foot drag;L hemiparesis;L knee leonides;Narrow BILL;Step through;Improper weight shift   Limitations Noted In Balance;Device Management;Endurance;Heel " Strike;Speed;Strength;Swing   Provided Assistance with: Balance;Trunk Support  (RW mgmt on L.)   Walk Assist Level Moderate Assist   Findings needs A for RW mgmt/advancing on L with use of LE hand scoop on RW. No Df wrap, cues to swing LLE, swing. Multiple short walks with turns to sit each direction, pt quick to sit, needing max cues and mod A to stop and reach back. Dec balance and inc A for RW mgmt when turning to L.   Does the patient walk? 2. Yes   Wheel 50 Feet with Two Turns   Type of Assistance Needed Physical assistance   Physical Assistance Level 26%-50%   Comment min/mod A for alignment and turns.   Wheel 50 Feet with Two Turns CARE Score 3   Wheelchair mobility   Method Right upper extremity;Right lower extremity;Left lower extremity   Assistance Provided For Locking Brakes   Distance Level Surface (feet) 90 ft   Findings (S)  not a goal - however may benefit from Wc mobility to optimize I with locomotion   Does the patient use a wheelchair? 0. No   Curb or Single Stair   Style negotiated Single stair   Type of Assistance Needed Adaptive equipment;Physical assistance   Physical Assistance Level 51%-75%   Comment R HR, L Knee blocking, limited by ftg, descends backwards completed 3 steps total - could not do one additional steps.   1 Step (Curb) CARE Score 2   Stairs   Type Stairs   # of Steps 3   Assist Devices Single Rail   Findings sig L knee blocking in stance.   Assessment   Treatment Assessment Pt engaged in 60 min skilled PT intervention, 2 daughters present for session to observe pts mobility and participate in family meeting later this AM. Use of L hand scoop on RW, pt needing min/mod A for gait and transfers due to dec L body strength and hemiparesis. Time spent talking to family about pt status and need for 24 7 A currently, family in agreement and aware. Opened the discussion of alt DC options that we can talk about more in the meeting. Family concerned of safety of pts home. Poor stair  tolerance, has 6 HARRY L HR and FF to only bathroom in the home. Probable need for SNF if 24 7 not available.   Barriers to Discharge Inaccessible home environment;Decreased caregiver support   Barriers to Discharge Comments lives alone, ML Home.   Plan   Progress Slow progress, decreased activity tolerance   PT Therapy Minutes   PT Time In 0830   PT Time Out 0930   PT Total Time (minutes) 60   PT Mode of treatment - Individual (minutes) 60   PT Mode of treatment - Concurrent (minutes) 0   PT Mode of treatment - Group (minutes) 0   PT Mode of treatment - Co-treat (minutes) 0   PT Mode of Treatment - Total time(minutes) 60 minutes   PT Cumulative Minutes 330   Therapy Time missed   Time missed? No

## 2025-05-22 NOTE — ASSESSMENT & PLAN NOTE
WBC count currently stable at 14.62.  No active s/s of infection.  Likely steroid induced.  Continue to trend routine CBC.

## 2025-05-22 NOTE — ASSESSMENT & PLAN NOTE
Continue melatonin 6 mg qHS (increased 5/22) Seroquel 37.5 mg qHs (last increased 5/22), nighttime dose of gabapentin increased on 5/20  Decadron dosing modified so second dose is given earlier   Encourage sleep hygiene (routine that promotes healthy circadian rhythm,avoid caffeine later in the day, quiet/dark room at night, reduce electronic before bedtime)

## 2025-05-22 NOTE — PROGRESS NOTES
"   05/22/25 1230   Therapy Time missed   Time missed? Yes   Amount of time missed 60   Reason for time missed Extreme fatigue   Time(s) multiple attempts made Pt tearful and begging OT, \"Please no more today. Please I'm so tired, can't we just please try again tomorrow?\" She was agreeable to the speech therapist checking on her at 2p after she tried to sleep. OT assisted to make her comfortable and turn off all the lights. Her brother and TRACEY arrived as OT was leaving and she asked them to leave as well stating she was too tired for visiting with anyone.       "

## 2025-05-22 NOTE — PLAN OF CARE
Problem: PAIN - ADULT  Goal: Verbalizes/displays adequate comfort level or baseline comfort level  Description: Interventions:  - Encourage patient to monitor pain and request assistance  - Assess pain using appropriate pain scale  - Administer analgesics as ordered based on type and severity of pain and evaluate response  - Implement non-pharmacological measures as appropriate and evaluate response  - Consider cultural and social influences on pain and pain management  - Notify physician/advanced practitioner if interventions unsuccessful or patient reports new pain  - Educate patient/family on pain management process including their role and importance of  reporting pain   - Provide non-pharmacologic/complimentary pain relief interventions  Outcome: Progressing     Problem: NEUROSENSORY - ADULT  Goal: Achieves stable or improved neurological status  Description: INTERVENTIONS  - Monitor and report changes in neurological status  - Monitor vital signs such as temperature, blood pressure and any other labs ordered       Outcome: Progressing

## 2025-05-22 NOTE — PROGRESS NOTES
05/22/25 1000   Assessment   Treatment Assessment Family meeting completed from 10am to 1055am with pt, 2 daughters, and ex- lashon on the phone. Also present, MD, PT, OT, and CM. Dicussed pt current medical status and potential future cancer treatments (palitative vs treatment). Pt asking appropropriate questions and seeking appropriate information. Pt acknowledging her need to not be alone at home and in need of hands on care for all activity 24 7. Discussed short and long term options including SNF level care for rehab and probable coverage given medical status/course pending progress and recovery. All receptive to STR to allow for time to improve moiblity, as well as plan for next phase, whether to transtion to [facility] vs 24 7 in home, aware and in agreement home setup is not ideal. PT was to see pt again for additional PT session however pt reports being very tired and not having it in her at this time. Plan for ST/OT in PM. CM to provided SNF list, HHA list, waiver program info, and care patrol and contact info.   PT Therapy Minutes   PT Time In 1000   PT Time Out 1030   PT Total Time (minutes) 30   PT Mode of treatment - Individual (minutes) 30   PT Mode of treatment - Concurrent (minutes) 0   PT Mode of treatment - Group (minutes) 0   PT Mode of treatment - Co-treat (minutes) 0   PT Mode of Treatment - Total time(minutes) 30 minutes   PT Cumulative Minutes 360   Therapy Time missed   Time missed? No

## 2025-05-22 NOTE — PROGRESS NOTES
05/22/25 1415   Therapy Time missed   Time missed? Yes   Amount of time missed 30   Reason for time missed Extreme fatigue-->SLP did attempt session but pt was sleeping and allowing to rest. SLP did f/u at 2:45 where pt reporting continued fatigue and wishing to continue to rest for the day. Pt appreciative for SLP checking in and will plan to complete session tomorrow.

## 2025-05-22 NOTE — PROGRESS NOTES
Progress Note - PMR   Name: Ayla Nye 74 y.o. 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 Treatments complicated by toxicities and infection.   Most recently receiving Taoxtere with plans to start chemotherapy after discharge   MRI brain showed stable enhancing lesions within the L superior occipital lobe and L parietal lobe with surrounding vasogenic edema, stable foci of restricted diffusion within the R posterior frontal lobe most likely sequela of subacute ischemia.  CT CAP showed multiple lesions with increase in size.    Plan:   Continue increased dose of dexamethasone 3mg BID.  Continue Bactrim -160mg MWF  Continue Keppra 1000mg BID  See pain insertion   Follows with Dr. Castro (Oncology), Dr Vuong (Radiation/Oncology), and Palliative as outpatient  Monitor for new or worsening pain, decreased ROM,  changes in strength, sensation, balance, and mentation,increased fatigue, SOB, edema, abdominal pain, nausea, vomiting, constipation, wt loss, worsening intake/appetite   HTN (hypertension)  Continue home amlodipine 5mg daily  Monitor BP trends   Insomnia  Continue melatonin 6 mg qHS (increased 5/22) Seroquel 37.5 mg qHs (last increased 5/22), nighttime dose of gabapentin increased on 5/20  Decadron dosing modified so second dose is given earlier   Encourage sleep hygiene (routine that promotes healthy circadian rhythm,avoid caffeine later in the day, quiet/dark room at night, reduce electronic before bedtime)  Hypothyroidism  Continue home levothyroxine 50mcg daily  Cancer associated pain  Current pain regimen: Scheduled Tylenol, gabapentin daily and qHS,, Lidoderm, and PRN Robaxin   Continue to monitor pain levels and adjust as needed   Anemia  Hgb currently stable at  10.3.  Baseline 9-10.  Continue home cyanocobalamin 1000mcg daily.  Continue to monitor CBC  Leukocytosis  Recently within 10-15 range   Likely steroid induced   Continue to monitor CBC and signs/symptoms of infection   History of pulmonary embolism  Most recent CTA chest in March 2025 negative for PE   Previously on Xarelto but discontinued due to new foci of hemorrhage within the L occipital metastasis (April 2025)   Monitor respiratory status   History of Pneumocystis jirovecii pneumonia  Continue Bactrim 3x weekly   GERD (gastroesophageal reflux disease)  Continue PTA Protonix 40mg daily  History of stroke  Right frontal subacute infarct diagnosed on 04/2025  Residual left hemiparesis   Continue ASA 81mg daily and Lipitor 40mg daily.  Continue with PT/OT  Follow-up with Neurovascular in 4-6 weeks as outpatient.  Fall  Presented on 5/12/25 after experiencing mechanical fall with head strike.  Hx of L sided weakness s/p stroke in 04/2025.  CTH showed vasogenic edema L parietal vertex compatible with known brain metastasis, no hemorrhage identified.  MRI brain showed stable enhancing lesions within the L superior occipital lobe and L parietal lobe with surrounding vasogenic edema, stable foci of restricted diffusion within the R posterior frontal lobe most likely sequela of subacute ischemia.  Per Neurology, symptoms likely due to some increase in size of CNS metastasis on top of noted vasogenic edema.  Home dexamethasone increased from 2mg BID to 3mg BID  Impaired mobility and ADLs  Patient was evaluated by the rehabilitation team MD and an appropriate candidate for acute inpatient rehabilitation program at this time.  The patient will tolerate 3 hours/day 5 to 7 days/week of intensive physical, speech and occupational therapy   in order to obtain goals for community discharge  Due to the patient's functional Compared to their baseline level of function in addition to their ongoing medical needs, the patient would  benefit from daily supervision from a rehabilitation physician as well as rehabilitation nursing to implement and adjust the medical as well as functional plan of care in order to meet the patient's goals.  Bladder: Monitor closely for urinary incontinence and/or retention. Monitor urine output and encourage spontaneous voids. If unable to void spontaneously, will monitor with PVR bladder scans and initiate CIC regimen.  Skin: Monitor for breakdown, frequent turns, pressure offloading  Bowel: Monitor closely for constipation and/or incontinence. Will follow BMs and adjust bowel regimen to target soft, formed stools q1-2 days, per pt's regular schedule  Discharge date TBD, pending interdisciplinary meeting     At risk for venous thromboembolism (VTE)  Continue Lovenox while inpatient     Subjective   Patient is a 74 year-old female with a past medical history of stage IV adenocarcinoma of the lung metastatic to brain, history of ischemic stroke in April 2025, GERD, hyperlipidemia, hypertension, history of pulmonary embolism, presenting to [facility] due to a progressive cancer metastases, recent fall, recent stroke and debility. She was admitted from 5/5 to 5/7 due to left-sided hemiparesis as a result of right MCA territory frontal lobe stroke. Xarelto was discontinued in April 2025 after new foci of hemorrhage within the left occipital metastasis She presented to the ED again on 5/12 after a fall with headstrike. Imaging did not show an acute traumatic injury, though did show progression of metastases. Palliative care, neurology, and heme onc consulted. She was evaluated by PT and OT and deemed an appropiate candidate for [facility] due to functional deficits.     Chief Complaint: f/u ambulatory dysfunction and increased weakness    Interval:   Seen and evaluated patient in room. She expresses frustration regarding her insomnia. Discussed sleep hygiene, avoiding sleep during the day. Will try increased dose of Seroquel and  melatonin tonight.  Last BM 5/21, continent of bowel and bladder. 5/22 labs reviewed.       Objective :  Temp:  [97.5 °F (36.4 °C)-98.1 °F (36.7 °C)] 97.5 °F (36.4 °C)  HR:  [82-85] 82  BP: (127-148)/(81-84) 127/81  Resp:  [18] 18  SpO2:  [92 %-94 %] 94 %  O2 Device: None (Room air)    Functional Update:  Physical Therapy Occupational Therapy Speech Therapy   Weight Bearing Status: Full Weight Bearing  Transfers: Minimal Assistance  Bed Mobility: Incidental Touching  Amulation Distance (ft): 90 feet  Ambulation: Assist of 2  Assistive Device for Ambulation: Roller Walker  Roller Walker Attachments:  (L hand scoop)  Number of Stairs: 4  Assistive Device for Stairs: Bilateral Hand Rails  Stair Assistance: Minimal Assistance  Ramp: Moderate Assistance  Assistive Device for Ramp: Hand Hold Assistance  Discharge Recommendations: Home with:  DC Home with:: First Floor Setup, Family Support, Home Physical Therapy (TBD pending needs and support at [place])   Eating: Supervision (s/u assist)  Grooming: Supervision  Bathing: Moderate Assistance  Bathing: Moderate Assistance  Upper Body Dressing: Moderate Assistance  Lower Body Dressing: Moderate Assistance (footwear CG/Myla)  Toileting: Minimal Assistance  Tub/Shower Transfer: Moderate Assistance  Toilet Transfer: Minimal Assistance  Cognition: Exceptions to WNL  Cognition: Decreased Attention, Decreased Safety  Orientation: Person, Place, Time, Situation   Mode of Communication: Verbal  Cognition: Exceptions to WNL  Cognition: Decreased Memory, Decreased Executive Functions, Decreased Attention, Decreased Comprehension  Orientation: Person, Place, Time, Situation  Discharge Recommendations: Home with:  DC Home with:: 24 Hour Supervision, Family Support (ST services pending progress)         Physical Exam  Vitals and nursing note reviewed.   Constitutional:       General: She is not in acute distress.     Appearance: She is not toxic-appearing or diaphoretic.      Comments:  Appears comfortable resting in bed    HENT:      Head: Normocephalic and atraumatic.      Mouth/Throat:      Mouth: Mucous membranes are moist.   Pulmonary:      Effort: Pulmonary effort is normal. No respiratory distress.     Neurological:      Mental Status: She is alert.      Comments: Alert and appropriate to questions    Psychiatric:         Mood and Affect: Mood normal.         Behavior: Behavior normal.             Scheduled Meds:  Current Facility-Administered Medications   Medication Dose Route Frequency Provider Last Rate    acetaminophen  975 mg Oral Daily Cristian Dias MD      And    acetaminophen  650 mg Oral Daily Cristian Dias MD      And    acetaminophen  975 mg Oral Daily Cristian Dias MD      And    acetaminophen  650 mg Oral Daily Cristian Dias MD      aluminum-magnesium hydroxide-simethicone  30 mL Oral Q4H PRN Claudine Leos PA-C      amLODIPine  10 mg Oral Daily ZULEYMA Viramontes      aspirin  81 mg Oral Daily Claudine Leos PA-C      atorvastatin  40 mg Oral Daily With Dinner Claudine Leos PA-C      vitamin B-12  1,000 mcg Oral Daily Claudine Leos PA-C      dexamethasone  3 mg Oral BID Cristian Dias MD      Diclofenac Sodium  2 g Topical 4x Daily Claudine Leos PA-C      enoxaparin  40 mg Subcutaneous Q24H Claudine Leos PA-C      gabapentin  200 mg Oral Daily Claudine Leos PA-C      And    gabapentin  400 mg Oral HS Claudine Leos PA-C      levETIRAcetam  1,000 mg Oral BID Claudine Leos PA-C      levothyroxine  50 mcg Oral Early Morning Claudine Leos PA-C      lidocaine  1 patch Topical Daily Claudine Leos PA-C      lisinopril  5 mg Oral Daily ZULEYMA Viramontes      melatonin  6 mg Oral HS Claudine Leos PA-C      methocarbamol  250 mg Oral Q6H PRN Claudine Leos PA-C      pantoprazole  40 mg Oral Daily Before Breakfast Claudine Leos PA-C      QUEtiapine  37.5 mg Oral HS Claudine Leos PA-C      senna  1 tablet Oral Daily Claudine  ZAC Leos      sulfamethoxazole-trimethoprim  1 tablet Oral Once per day on Monday Wednesday Friday Claudine Leos PA-C           Lab Results: I have reviewed the following results:  Results from last 7 days   Lab Units 05/22/25 0538 05/19/25 0523 05/16/25  0537   HEMOGLOBIN g/dL 9.3* 10.3* 10.1*   HEMATOCRIT % 31.5* 34.1* 34.0*   WBC Thousand/uL 14.62* 15.97* 14.84*   PLATELETS Thousands/uL 289 315 333     Results from last 7 days   Lab Units 05/22/25 0538 05/19/25 0523 05/16/25  0537   BUN mg/dL 29* 20 25   SODIUM mmol/L 139 140 140   POTASSIUM mmol/L 4.3 4.7 4.2   CHLORIDE mmol/L 103 104 104   CREATININE mg/dL 0.80 0.82 0.86   AST U/L  --   --  11*   ALT U/L  --   --  13            Claudine Leos PA-C  Physical Medicine and Rehabilitation  [facility]

## 2025-05-22 NOTE — PROGRESS NOTES
05/22/25 1030   Pain Assessment   Pain Assessment Tool 0-10   Pain Score No Pain   Patient's Stated Pain Goal No pain   Restrictions/Precautions   Precautions Bed/chair alarms;Cognitive;Fall Risk;Supervision on toilet/commode;Visual deficit   Weight Bearing Restrictions No   ROM Restrictions No   Lifestyle   Autonomy Pt actively engaged in IDT meeting with family.   Reciprocal Relationships lives alone, has two supportive dtrs ~hr away, HHA 5hrs/day   Service to Others Retired, previously working for [employer]   Intrinsic Gratification watching TV, her family   Sit to Stand   Type of Assistance Needed Physical assistance   Physical Assistance Level 25% or less   Comment min A to stand from recliner to RW   Sit to Stand CARE Score 3   Bed-Chair Transfer   Type of Assistance Needed Physical assistance   Physical Assistance Level 26%-50%   Comment stand pivot standard chair to wc with HHA   Chair/Bed-to-Chair Transfer CARE Score 3   Cognition   Overall Cognitive Status Impaired   Arousal/Participation Alert;Cooperative   Attention Attends with cues to redirect   Orientation Level Oriented X4   Memory Decreased short term memory;Decreased recall of precautions   Following Commands Follows one step commands with increased time or repetition   Activity Tolerance   Activity Tolerance Patient limited by fatigue   Assessment   Treatment Assessment Pt engaged in interdisciplinary family meeting to discuss CLOF and potential options/considerations for discharge. In attendence was: Pt, her two daughters Sue and Juliana, her ex- via telephone, Dr Dias, OT, PT, EROS Alejo. Pt's medical barriers and questions were reviewed, as well as OT educating the family on Pt's current selfcare levels, brief overview of cognitive variability with fatigue affecting safety. Pt's lack of extended sleep hours related to the steroids/restlessness also remains a barrier for predicting what hours of the day Pt will be mobilizing,  require toileting, etc. Pt was provided a list of Subacute facilities, HHA agencies, information on Care Protrol to assist in a permanent move from personal home to care facility, and information on waiver services to financially support in-home care. Pt is in agreement that she can not go home alone at this time and will need hands on physical assistance with all tasks, dependent for IADLs. Family is leaning toward a subacute rehab stay while they arrange for increased care/ determine an alternate location for residence. OT goals downgraded to reflect need for partial assistance, and removed the mod A IADL goals.   Barriers to Discharge Inaccessible home environment;Decreased caregiver support   Plan   Progress Slow progress, decreased activity tolerance   OT Therapy Minutes   OT Time In 1030   OT Time Out 1055   OT Total Time (minutes) 25   OT Mode of treatment - Individual (minutes) 25   OT Mode of treatment - Concurrent (minutes) 0   OT Mode of treatment - Group (minutes) 0   OT Mode of treatment - Co-treat (minutes) 0   OT Mode of Treatment - Total time(minutes) 25 minutes   OT Cumulative Minutes 445   Therapy Time missed   Time missed? No

## 2025-05-22 NOTE — PLAN OF CARE
Problem: PAIN - ADULT  Goal: Verbalizes/displays adequate comfort level or baseline comfort level  Description: Interventions:  - Encourage patient to monitor pain and request assistance  - Assess pain using appropriate pain scale  - Administer analgesics as ordered based on type and severity of pain and evaluate response  - Implement non-pharmacological measures as appropriate and evaluate response  - Consider cultural and social influences on pain and pain management  - Notify physician/advanced practitioner if interventions unsuccessful or patient reports new pain  - Educate patient/family on pain management process including their role and importance of  reporting pain   - Provide non-pharmacologic/complimentary pain relief interventions  Outcome: Progressing     Problem: SAFETY ADULT  Goal: Patient will remain free of falls  Description: INTERVENTIONS:  - Educate patient/family on patient safety including physical limitations  - Instruct patient to call for assistance with activity   - Keep Call bell within reach  - Keep bed low and locked with side rails adjusted as appropriate  - Keep care items and personal belongings within reach  - Initiate and maintain comfort rounds  - Make Fall Risk Sign visible to staff  - Offer Toileting every 2 Hours, in advance of need  - Initiate/Maintain bed/chair alarm  - Obtain necessary fall risk management equipment: bed/chair sensor  - Apply yellow socks and bracelet for high fall risk patients  - Consider moving patient to room near nurses station  Outcome: Progressing  Goal: Maintains/Returns to pre admission functional level  Description: INTERVENTIONS:  - Set and communicate daily mobility goal to care team and patient/family/caregiver.   - Collaborate with rehabilitation services on mobility goals if consulted  - Perform Range of Motion 3 times a day.  - Reposition patient every 2 hours.  - Stand patient 3 times a day  - Ambulate patient 3 times a day  - Out of bed to  chair 3 times a day   - Out of bed for meals 3 times a day  - Out of bed for toileting  - Record patient progress and toleration of activity level   Outcome: Progressing     Problem: Knowledge Deficit  Goal: Patient/family/caregiver demonstrates understanding of disease process, treatment plan, medications, and discharge instructions  Description: Complete learning assessment and assess knowledge base.  Interventions:  - Provide teaching at level of understanding  - Provide teaching via preferred learning methods  Outcome: Progressing     Problem: NEUROSENSORY - ADULT  Goal: Achieves stable or improved neurological status  Description: INTERVENTIONS  - Monitor and report changes in neurological status  - Monitor vital signs such as temperature, blood pressure and any other labs ordered       Outcome: Progressing     Problem: GASTROINTESTINAL - ADULT  Goal: Maintains adequate nutritional intake  Description: INTERVENTIONS:  - Monitor percentage of each meal consumed  - Identify factors contributing to decreased intake, treat as appropriate  - Assist with meals as needed  - Monitor I&O, weight, and lab values if indicated  - Obtain nutrition services referral as needed  Outcome: Progressing     Problem: MUSCULOSKELETAL - ADULT  Goal: Maintain or return mobility to safest level of function  Description: INTERVENTIONS:  - Assess patient's ability to carry out ADLs; assess patient's baseline for ADL function and identify physical deficits which impact ability to perform ADLs (bathing, care of mouth/teeth, toileting, grooming, dressing, etc.)  - Assess/evaluate cause of self-care deficits   - Assess range of motion  - Assess patient's mobility  - Assess patient's need for assistive devices and provide as appropriate  - Encourage maximum independence but intervene and supervise when necessary  - Involve family in performance of ADLs  - Assess for home care needs following discharge   - Provide patient education as  appropriate  Outcome: Progressing  Goal: Maintain proper alignment of affected body part  Description: INTERVENTIONS:  - Support, maintain and protect limb and body alignment  - Provide patient/ family with appropriate education  Outcome: Progressing

## 2025-05-23 PROCEDURE — 97129 THER IVNTJ 1ST 15 MIN: CPT

## 2025-05-23 PROCEDURE — 97110 THERAPEUTIC EXERCISES: CPT

## 2025-05-23 PROCEDURE — 99232 SBSQ HOSP IP/OBS MODERATE 35: CPT | Performed by: INTERNAL MEDICINE

## 2025-05-23 PROCEDURE — 97530 THERAPEUTIC ACTIVITIES: CPT

## 2025-05-23 PROCEDURE — 97116 GAIT TRAINING THERAPY: CPT

## 2025-05-23 PROCEDURE — 97535 SELF CARE MNGMENT TRAINING: CPT

## 2025-05-23 PROCEDURE — 99233 SBSQ HOSP IP/OBS HIGH 50: CPT | Performed by: PHYSICAL MEDICINE & REHABILITATION

## 2025-05-23 PROCEDURE — 97112 NEUROMUSCULAR REEDUCATION: CPT

## 2025-05-23 PROCEDURE — 97130 THER IVNTJ EA ADDL 15 MIN: CPT

## 2025-05-23 RX ORDER — MIRTAZAPINE 7.5 MG/1
7.5 TABLET, FILM COATED ORAL
Status: DISCONTINUED | OUTPATIENT
Start: 2025-05-23 | End: 2025-05-30 | Stop reason: HOSPADM

## 2025-05-23 RX ORDER — DEXAMETHASONE 4 MG/1
2 TABLET ORAL 2 TIMES DAILY
Status: DISCONTINUED | OUTPATIENT
Start: 2025-05-23 | End: 2025-05-29

## 2025-05-23 RX ADMIN — DEXAMETHASONE 3 MG: 0.5 TABLET ORAL at 06:05

## 2025-05-23 RX ADMIN — ATORVASTATIN CALCIUM 40 MG: 40 TABLET, FILM COATED ORAL at 17:29

## 2025-05-23 RX ADMIN — AMLODIPINE BESYLATE 10 MG: 10 TABLET ORAL at 08:16

## 2025-05-23 RX ADMIN — METHOCARBAMOL TABLETS 250 MG: 500 TABLET, COATED ORAL at 08:23

## 2025-05-23 RX ADMIN — LEVETIRACETAM 1000 MG: 500 TABLET, FILM COATED ORAL at 20:42

## 2025-05-23 RX ADMIN — ACETAMINOPHEN 975 MG: 325 TABLET, FILM COATED ORAL at 17:30

## 2025-05-23 RX ADMIN — LIDOCAINE 5% 1 PATCH: 700 PATCH TOPICAL at 15:29

## 2025-05-23 RX ADMIN — MIRTAZAPINE 7.5 MG: 7.5 TABLET, FILM COATED ORAL at 20:42

## 2025-05-23 RX ADMIN — LEVOTHYROXINE SODIUM 50 MCG: 0.05 TABLET ORAL at 05:40

## 2025-05-23 RX ADMIN — ASPIRIN 81 MG CHEWABLE TABLET 81 MG: 81 TABLET CHEWABLE at 08:16

## 2025-05-23 RX ADMIN — DEXAMETHASONE 2 MG: 4 TABLET ORAL at 17:29

## 2025-05-23 RX ADMIN — CYANOCOBALAMIN TAB 1000 MCG 1000 MCG: 1000 TAB at 08:16

## 2025-05-23 RX ADMIN — ACETAMINOPHEN 650 MG: 325 TABLET, FILM COATED ORAL at 12:20

## 2025-05-23 RX ADMIN — SULFAMETHOXAZOLE AND TRIMETHOPRIM 1 TABLET: 800; 160 TABLET ORAL at 08:16

## 2025-05-23 RX ADMIN — GABAPENTIN 400 MG: 400 CAPSULE ORAL at 21:26

## 2025-05-23 RX ADMIN — DICLOFENAC SODIUM 2 G: 10 GEL TOPICAL at 08:23

## 2025-05-23 RX ADMIN — PANTOPRAZOLE SODIUM 40 MG: 40 TABLET, DELAYED RELEASE ORAL at 06:05

## 2025-05-23 RX ADMIN — ACETAMINOPHEN 975 MG: 325 TABLET, FILM COATED ORAL at 06:24

## 2025-05-23 RX ADMIN — LEVETIRACETAM 1000 MG: 500 TABLET, FILM COATED ORAL at 08:16

## 2025-05-23 RX ADMIN — GABAPENTIN 200 MG: 100 CAPSULE ORAL at 08:16

## 2025-05-23 RX ADMIN — ENOXAPARIN SODIUM 40 MG: 40 INJECTION SUBCUTANEOUS at 15:29

## 2025-05-23 RX ADMIN — DICLOFENAC SODIUM 2 G: 10 GEL TOPICAL at 12:21

## 2025-05-23 RX ADMIN — QUETIAPINE FUMARATE 37.5 MG: 25 TABLET ORAL at 21:26

## 2025-05-23 RX ADMIN — LISINOPRIL 5 MG: 5 TABLET ORAL at 08:16

## 2025-05-23 NOTE — PROGRESS NOTES
05/23/25 1030   Pain Assessment   Pain Assessment Tool 0-10   Pain Score 3   Pain Location/Orientation Location: Back   Effect of Pain on Daily Activities tolerated mobilities, states pain is chronic. Pt uses aquaK pad in room for pain relief   Restrictions/Precautions   Precautions Bed/chair alarms;Cognitive;Fall Risk;Supervision on toilet/commode;Visual deficit   Weight Bearing Restrictions No   ROM Restrictions No   Cognition   Overall Cognitive Status Impaired   Arousal/Participation Alert;Cooperative   Subjective   Subjective Pt reports feeling very exhausted today, states she only really slept from 0130 to 0300   Sit to Lying   Type of Assistance Needed Supervision   Comment on mat table   Sit to Lying CARE Score 4   Lying to Sitting on Side of Bed   Type of Assistance Needed Physical assistance   Physical Assistance Level 26%-50%   Comment modAx1 for trunk assist on mat table   Lying to Sitting on Side of Bed CARE Score 3   Sit to Stand   Type of Assistance Needed Physical assistance   Physical Assistance Level 25% or less   Comment minAx1 to place LLE on hand scoop of walker prior to performing STS as well as cues for R hand placement for safety   Sit to Stand CARE Score 3   Bed-Chair Transfer   Type of Assistance Needed Physical assistance   Physical Assistance Level 25% or less   Comment minAx1 no AD to R, min-modAx1 with RW to L side   Chair/Bed-to-Chair Transfer CARE Score 3   Transfer Bed/Chair/Wheelchair   Findings pt fluctuated from min-modAx1 with SPTs with RW this session. Cues to turn fully for safety as pt attempts to prematurely sit, cues to slowly lower to surface, cues for L foot clearance, assist for weight shift, and cues for hand placement.   Walk 10 Feet   Type of Assistance Needed Physical assistance   Physical Assistance Level 26%-50%   Comment RW with L hand scoop   Walk 10 Feet CARE Score 3   Walk 50 Feet with Two Turns   Type of Assistance Needed Physical assistance   Physical  "Assistance Level Total assistance   Comment use of 2nd person CF as pt reporting feeling very fatigued this session, was able to walk in hallway 70' to room and sit w/o needing w/c pulled up behind pt   Walk 50 Feet with Two Turns CARE Score 1   Walk 150 Feet   Reason if not Attempted Safety concerns   Walk 150 Feet CARE Score 88   Ambulation   Primary Mode of Locomotion Prior to Admission Walk   Distance Walked (feet) 70 ft  (x1, 35'x1, 15'x1)   Assist Device Roller Walker  (L hand scoop)   Gait Pattern Inconsistant Aparna;Slow Aparna;Decreased foot clearance;L foot drag;L hemiparesis;L knee leonides;Narrow BILL;Step to;Step through;Decreased L stance;Improper weight shift   Limitations Noted In Balance;Device Management;Endurance;Heel Strike;Safety;Speed;Strength;Swing   Provided Assistance with: Balance;Weight Shift;Trunk Support   Walk Assist Level Moderate Assist   Findings pt continues to require A for RW management due to L inattention, also needs cues to attend to L foot placement as pt kicks back RW leg at times. Cues in L stance to \"push through the floor\" as pt with L knee buckling   Does the patient walk? 2. Yes   Wheelchair mobility   Findings practiced short distances with RUE/RLE in 16\" width rae height w/c, still requires min-modAx1 with cues to use RLE to assist with steering   Stairs   Findings not performed this session due to much fatigue   Therapeutic Interventions   Strengthening supine mat program performed this session due to fatigue: b/l SAQs with3# RLE, no weight LLE 3x10, SLRs 3x10 with AAROM LLE, LLE resisted hip/knee flex/ext 3x10, glute bridges 3x10, R S/L L clamshells 3x10, R s/l L hip abd 3x10   Flexibility seated b/l HS and gastroc stretching 5' total   Neuromuscular Re-Education with RUE support on HR, LLE step taps to X on bottom 6\"  step performed 3 sets to fatigue (x10, x7, x10) with occ A needed to bring LLE from step to ground on final reps   Equipment Use   NuStep lvl " "1, 10 mins total, all extremities with LUE attachment; then with LUE/LLE only lvl 1 1', lvl 2 1', lvl 3 1', lvl 4 1'. Assist for knee/hip flex, no assist for hip/knee ext   Assessment   Treatment Assessment Pt presented to PT session in w/c, was working on w/c mobility with OT Melissa who provided pt with 16x16\" w/c to improve pt's ability to manage w/c. Trialed short distance propulsion in w/c, still requires cueing and A for w/c management. Plan to cont to implement w/c mobility in POC to make pt more mobile and increase independence as much as possible. Pt very fatigued this session, states she only slept for less than 3 hours night prior. She discussed this with MD during session but due to this performed supine mat strengthening program which pt tolerated well. End of session pt requesting to sit EOB to eat lunch, refused to sit in recliner or w/c due to fatigue. At this time pt cont to require min-modAx1 for mobilities due to L hemiparesis, L inattention, and decreased activity tolerance. Cont POC focusing on improving pt's safety with transfers and ambulation with use of RW with hand scoop, improving pt's trength, improving pt's balance, improving pt's activity tolerance, all to maximize pt's independence with mobilities and reduce pt's fall risk.   Problem List Decreased strength;Decreased endurance;Impaired balance;Decreased mobility;Decreased coordination;Decreased cognition;Impaired judgement;Decreased safety awareness;Decreased skin integrity   Barriers to Discharge Inaccessible home environment;Decreased caregiver support   PT Barriers   Functional Limitation Car transfers;Stair negotiation;Standing;Transfers;Walking   Plan   Progress Slow progress, decreased activity tolerance   PT Therapy Minutes   PT Time In 1030   PT Time Out 1200   PT Total Time (minutes) 90   PT Mode of treatment - Individual (minutes) 90   PT Mode of treatment - Concurrent (minutes) 0   PT Mode of treatment - Group (minutes) 0   PT " Mode of treatment - Co-treat (minutes) 0   PT Mode of Treatment - Total time(minutes) 90 minutes   PT Cumulative Minutes 450   Therapy Time missed   Time missed? No

## 2025-05-23 NOTE — PROGRESS NOTES
Progress Note - PMR   Name: Ayla Nye 74 y.o. 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 Treatments complicated by toxicities and infection.   Most recently receiving Taoxtere with plans to start chemotherapy after discharge   MRI brain showed stable enhancing lesions within the L superior occipital lobe and L parietal lobe with surrounding vasogenic edema, stable foci of restricted diffusion within the R posterior frontal lobe most likely sequela of subacute ischemia.  CT CAP showed multiple lesions with increase in size.    Plan:   Continue increased dose of dexamethasone 2mg BID.  Continue Bactrim -160mg MWF  Continue Keppra 1000mg BID  See pain insertion   Follows with Dr. Castro (Oncology), Dr Vuong (Radiation/Oncology), and Palliative as outpatient  Monitor for new or worsening pain, decreased ROM,  changes in strength, sensation, balance, and mentation,increased fatigue, SOB, edema, abdominal pain, nausea, vomiting, constipation, wt loss, worsening intake/appetite   HTN (hypertension)  Continue home amlodipine 5mg daily  Monitor BP trends   Insomnia  Continue melatonin 6 mg qHS (increased 5/22) Seroquel 37.5 mg qHs (last increased 5/22), nighttime dose of gabapentin increased on 5/20  Remeron added 5/23 5/23: reached out to on-call oncology, Decadron decreased to 2 mg BID   Decadron dosing modified so second dose is given earlier   Encourage sleep hygiene (routine that promotes healthy circadian rhythm,avoid caffeine later in the day, quiet/dark room at night, reduce electronic before bedtime)  Hypothyroidism  Continue home levothyroxine 50mcg daily  Cancer associated pain  Current pain regimen: Scheduled Tylenol, gabapentin daily and qHS, Lidoderm, and PRN Robaxin    Continue to monitor pain levels and adjust as needed   Anemia  Hgb currently stable at 10.3.  Baseline 9-10.  Continue home cyanocobalamin 1000mcg daily.  Continue to monitor CBC  Leukocytosis  Recently within 10-15 range   Likely steroid induced   Continue to monitor CBC and signs/symptoms of infection   History of pulmonary embolism  Most recent CTA chest in March 2025 negative for PE   Previously on Xarelto but discontinued due to new foci of hemorrhage within the L occipital metastasis (April 2025)   Monitor respiratory status   History of Pneumocystis jirovecii pneumonia  Continue Bactrim 3x weekly   GERD (gastroesophageal reflux disease)  Continue PTA Protonix 40mg daily  History of stroke  Right frontal subacute infarct diagnosed on 04/2025  Residual left hemiparesis   Continue ASA 81mg daily and Lipitor 40mg daily.  Continue with PT/OT  Follow-up with Neurovascular in 4-6 weeks as outpatient.  Fall  Presented on 5/12/25 after experiencing mechanical fall with head strike.  Hx of L sided weakness s/p stroke in 04/2025.  CTH showed vasogenic edema L parietal vertex compatible with known brain metastasis, no hemorrhage identified.  MRI brain showed stable enhancing lesions within the L superior occipital lobe and L parietal lobe with surrounding vasogenic edema, stable foci of restricted diffusion within the R posterior frontal lobe most likely sequela of subacute ischemia.  Per Neurology, symptoms likely due to some increase in size of CNS metastasis on top of noted vasogenic edema.  Home dexamethasone increased from 2mg BID to 3mg BID ---> decreased back to 2 mg BID due to insomnia   Impaired mobility and ADLs  Patient was evaluated by the rehabilitation team MD and an appropriate candidate for acute inpatient rehabilitation program at this time.  The patient will tolerate 3 hours/day 5 to 7 days/week of intensive physical, speech and occupational therapy   in order to obtain goals for community  discharge  Due to the patient's functional Compared to their baseline level of function in addition to their ongoing medical needs, the patient would benefit from daily supervision from a rehabilitation physician as well as rehabilitation nursing to implement and adjust the medical as well as functional plan of care in order to meet the patient's goals.  Bladder: Monitor closely for urinary incontinence and/or retention. Monitor urine output and encourage spontaneous voids. If unable to void spontaneously, will monitor with PVR bladder scans and initiate CIC regimen.  Skin: Monitor for breakdown, frequent turns, pressure offloading  Bowel: Monitor closely for constipation and/or incontinence. Will follow BMs and adjust bowel regimen to target soft, formed stools q1-2 days, per pt's regular schedule  Discharge date TBD, pending interdisciplinary meeting     At risk for venous thromboembolism (VTE)  Continue Lovenox while inpatient     Subjective   Patient is a 74 year-old female with a past medical history of stage IV adenocarcinoma of the lung metastatic to brain, history of ischemic stroke in April 2025, GERD, hyperlipidemia, hypertension, history of pulmonary embolism, presenting to [facility] due to a progressive cancer metastases, recent fall, recent stroke and debility. She was admitted from 5/5 to 5/7 due to left-sided hemiparesis as a result of right MCA territory frontal lobe stroke. Xarelto was discontinued in April 2025 after new foci of hemorrhage within the left occipital metastasis She presented to the ED again on 5/12 after a fall with headstrike. Imaging did not show an acute traumatic injury, though did show progression of metastases. Palliative care, neurology, and heme onc consulted. She was evaluated by PT and OT and deemed an appropiate candidate for [facility] due to functional deficits.     Chief Complaint: f/u ambulatory dysfunction and increased weakness    Interval: Seen and evaluated patient in room.  Discussed adjustments in medications. She does not have any concerns at this time.  Last BM 5/22, continent of bowel and bladder.     Objective :  Temp:  [97.4 °F (36.3 °C)-98.8 °F (37.1 °C)] 98.8 °F (37.1 °C)  HR:  [74-91] 91  BP: (120-151)/(74-79) 123/77  Resp:  [18] 18  SpO2:  [92 %-94 %] 94 %  O2 Device: None (Room air)    Functional Update:  Physical Therapy Occupational Therapy Speech Therapy   Weight Bearing Status: Full Weight Bearing  Transfers: Minimal Assistance  Bed Mobility: Incidental Touching  Amulation Distance (ft): 90 feet  Ambulation: Assist of 2  Assistive Device for Ambulation: Roller Walker  Roller Walker Attachments:  (L hand scoop)  Number of Stairs: 4  Assistive Device for Stairs: Bilateral Hand Rails  Stair Assistance: Minimal Assistance  Ramp: Moderate Assistance  Assistive Device for Ramp: Hand Hold Assistance  Discharge Recommendations: Home with:  DC Home with:: First Floor Setup, Family Support, Home Physical Therapy (TBD pending needs and support at [place])   Eating: Supervision (s/u assist)  Grooming: Supervision  Bathing: Moderate Assistance  Bathing: Moderate Assistance  Upper Body Dressing: Moderate Assistance  Lower Body Dressing: Moderate Assistance (footwear CG/Myla)  Toileting: Minimal Assistance  Tub/Shower Transfer: Moderate Assistance  Toilet Transfer: Minimal Assistance  Cognition: Exceptions to WNL  Cognition: Decreased Attention, Decreased Safety  Orientation: Person, Place, Time, Situation   Mode of Communication: Verbal  Cognition: Exceptions to WNL  Cognition: Decreased Memory, Decreased Executive Functions, Decreased Attention, Decreased Comprehension  Orientation: Person, Place, Time, Situation  Discharge Recommendations: Home with:  DC Home with:: 24 Hour Supervision, Family Support (ST services pending progress)         Physical Exam  Vitals and nursing note reviewed.   Constitutional:       General: She is not in acute distress.     Appearance: She is not  toxic-appearing or diaphoretic.      Comments: Appears comfortable resting in bed    HENT:      Head: Normocephalic and atraumatic.      Mouth/Throat:      Mouth: Mucous membranes are moist.   Pulmonary:      Effort: Pulmonary effort is normal. No respiratory distress.     Neurological:      Mental Status: She is alert.      Comments: Alert and appropriate to questions    Psychiatric:         Mood and Affect: Mood normal.         Behavior: Behavior normal.           Scheduled Meds:  Current Facility-Administered Medications   Medication Dose Route Frequency Provider Last Rate    acetaminophen  975 mg Oral Daily Cristian Dias MD      And    acetaminophen  650 mg Oral Daily Cristian Dias MD      And    acetaminophen  975 mg Oral Daily Cristian Dias MD      And    acetaminophen  650 mg Oral Daily Cristian Dias MD      aluminum-magnesium hydroxide-simethicone  30 mL Oral Q4H PRN Claudine Leos PA-C      amLODIPine  10 mg Oral Daily ZULEYMA Viramontes      aspirin  81 mg Oral Daily Claudine Leos PA-C      atorvastatin  40 mg Oral Daily With Dinner Claudine Leos PA-C      vitamin B-12  1,000 mcg Oral Daily Claudine Leos PA-C      dexamethasone  2 mg Oral BID Claudine Leos PA-C      Diclofenac Sodium  2 g Topical 4x Daily Claudine Leos PA-C      enoxaparin  40 mg Subcutaneous Q24H Claudine Leos PA-C      gabapentin  200 mg Oral Daily Claudine Leos PA-C      And    gabapentin  400 mg Oral HS Claudine Leos PA-C      levETIRAcetam  1,000 mg Oral BID Claudine Leos PA-C      levothyroxine  50 mcg Oral Early Morning Claudine Leos PA-C      lidocaine  1 patch Topical Daily Claudine Leos PA-C      lisinopril  5 mg Oral Daily ZULEYMA Viramontes      methocarbamol  250 mg Oral Q6H PRN Claudine Leos PA-C      mirtazapine  7.5 mg Oral HS Cristian Dias MD      pantoprazole  40 mg Oral Daily Before Breakfast Claudine Leos PA-C      QUEtiapine  37.5 mg Oral HS Claudine Leos  ZAC      senna  1 tablet Oral Daily Claudine Leos PA-C      sulfamethoxazole-trimethoprim  1 tablet Oral Once per day on Monday Wednesday Friday Claudine Leos PA-C           Lab Results: I have reviewed the following results:  Results from last 7 days   Lab Units 05/22/25  0538 05/19/25  0523   HEMOGLOBIN g/dL 9.3* 10.3*   HEMATOCRIT % 31.5* 34.1*   WBC Thousand/uL 14.62* 15.97*   PLATELETS Thousands/uL 289 315     Results from last 7 days   Lab Units 05/22/25  0538 05/19/25  0523   BUN mg/dL 29* 20   SODIUM mmol/L 139 140   POTASSIUM mmol/L 4.3 4.7   CHLORIDE mmol/L 103 104   CREATININE mg/dL 0.80 0.82            Claudine Leos PA-C  Physical Medicine and Rehabilitation  [facility]

## 2025-05-23 NOTE — CASE MANAGEMENT
CM received messages from [facility] and [facility], that they have bed availability. [facility] and [facility] are still reviewing the referral. Patient made aware, and CM called daughter Juliana, and made her aware . CM will continue to follow.

## 2025-05-23 NOTE — ASSESSMENT & PLAN NOTE
Called patient, patient did not answer. Unable to leave voicemail.   ----- Message from Buddy Reeves sent at 7/16/2024 11:00 AM CDT -----  Type:  Needs Medical Advice    Who Called: clif  Symptoms (please be specific):    How long has patient had these symptoms:    Pharmacy name and phone #:    Would the patient rather a call back or a response via [facility]? Call back  Best Call Back Number: [phone]  Additional Information: Patient will like to reschedule appt for today   Presented on 5/12/25 after experiencing mechanical fall with head strike.  Hx of L sided weakness s/p stroke in 04/2025.  CTH showed vasogenic edema L parietal vertex compatible with known brain metastasis, no hemorrhage identified.  MRI brain showed stable enhancing lesions within the L superior occipital lobe and L parietal lobe with surrounding vasogenic edema, stable foci of restricted diffusion within the R posterior frontal lobe most likely sequela of subacute ischemia.    Per Neurology - symptoms likely due to some increase in size of CNS metastasis on top of noted vasogenic edema.  Home dexamethasone increased from 2mg BID to 3mg BID.  Dose decreased back to 2mg BID on 5/23.    Neurovascular checks Q shift.  Follow-up with Neurology in 4-6 weeks as outpatient.    Primary team following.  PT/OT/SLP.

## 2025-05-23 NOTE — ASSESSMENT & PLAN NOTE
Presented on 5/12/25 after experiencing mechanical fall with head strike.  Hx of L sided weakness s/p stroke in 04/2025.  CTH showed vasogenic edema L parietal vertex compatible with known brain metastasis, no hemorrhage identified.  MRI brain showed stable enhancing lesions within the L superior occipital lobe and L parietal lobe with surrounding vasogenic edema, stable foci of restricted diffusion within the R posterior frontal lobe most likely sequela of subacute ischemia.  Per Neurology, symptoms likely due to some increase in size of CNS metastasis on top of noted vasogenic edema.  Home dexamethasone increased from 2mg BID to 3mg BID ---> decreased back to 2 mg BID due to insomnia

## 2025-05-23 NOTE — PROGRESS NOTES
"   05/23/25 1549   Pain Assessment   Pain Assessment Tool 0-10   Pain Score 2   Patient's Stated Pain Goal No pain   Restrictions/Precautions   Precautions Bed/chair alarms;Cognitive;Fall Risk;Supervision on toilet/commode;Visual deficit   Weight Bearing Restrictions No   ROM Restrictions No   Lifestyle   Autonomy \"I'm ready to try today.\"   Reciprocal Relationships lives alone, has two supportive dtrs ~hr away, HHA 5hrs/day   Service to Others Retired, previously working for [employer]   Intrinsic Gratification watching TV, her family   Roll Left and Right   Type of Assistance Needed Physical assistance   Physical Assistance Level 26%-50%   Comment min A to the right with step by step cues using side rail, mod A to the left using side rail and step by step cues.   Roll Left and Right CARE Score 3   Sit to Lying   Type of Assistance Needed Supervision   Physical Assistance Level No physical assistance   Comment on left side of the bed   Sit to Lying CARE Score 4   Lying to Sitting on Side of Bed   Type of Assistance Needed Physical assistance   Physical Assistance Level 25% or less   Comment bed flat use of side rail, getting out on the left   Lying to Sitting on Side of Bed CARE Score 3   Sit to Stand   Type of Assistance Needed Physical assistance   Physical Assistance Level 25% or less   Comment min A   Sit to Stand CARE Score 3   Bed-Chair Transfer   Type of Assistance Needed Physical assistance   Physical Assistance Level 26%-50%   Comment min/mod A stand pivot without AD   Chair/Bed-to-Chair Transfer CARE Score 3   Toileting Hygiene   Type of Assistance Needed Physical assistance   Physical Assistance Level 25% or less   Comment min A balance and left side of pants   Toileting Hygiene CARE Score 3   Toilet Transfer   Type of Assistance Needed Physical assistance   Physical Assistance Level 25% or less   Comment min A stand pivot  to [facility] over toilet   Toilet Transfer CARE Score 3   Functional Standing " Tolerance   Time 4m10s   Activity standing card sort task with LUE WB in extension on table; seated completion of the activity with LUE forearm weightbearing on table.   Therapeutic Exercise - ROM   UE-ROM   (AAROM in gravity eliminated plane for digital flex/ext, thumb flex/ext and ABD/ADD.)   Therapeutic Excerise-Strength   UE Strength   (use of towel slides for active movement of left UE x 15reps x 2 sets with extended rest breaks between: shoulder flexion/scap protraction to neutral shoulder /scap retraction; horizontal ABD/ADD with elbow flex/ext; hand scrunch of the towel back to flat)   Cognition   Overall Cognitive Status Impaired   Arousal/Participation Alert;Cooperative   Attention Attends with cues to redirect   Orientation Level Oriented X4   Memory Decreased short term memory;Decreased recall of precautions   Following Commands Follows one step commands with increased time or repetition   Activity Tolerance   Activity Tolerance Patient limited by fatigue   Assessment   Treatment Assessment Pt engaged in 60min OT session to address training o bed mobility techniques due to Pt having difficulty repositioning herself overnight for comfort. Despite trial of various LUE positions and pillow use, she is unable to tolerate left side lying. She did find comfort in right sidelying with a pillow lengthwise against her chest to support LUE and trunk. Pt worked on seated ROM activities for LUE to improve strength and NMR, as well as standing tolerance tasks using LUE for stabilization and WB. Pt able to tolerate the session but was having trouble keeping her eyes open by the end and requested to rest in bed for the hour before her PT session. Pt continues to benefit from transfer training, NMR, LUE strengthening, hemidressing technique training.   Prognosis Fair   Problem List Decreased strength;Decreased endurance;Impaired balance;Decreased mobility;Decreased coordination;Decreased cognition;Impaired  judgement;Decreased safety awareness;Decreased skin integrity   Barriers to Discharge Inaccessible home environment;Decreased caregiver support   Plan   Treatment/Interventions ADL retraining;Functional transfer training;Therapeutic exercise;Endurance training;Patient/family training;Compensatory technique education   Progress Slow progress, decreased activity tolerance   OT Therapy Minutes   OT Time In 0830   OT Time Out 0930   OT Total Time (minutes) 60   OT Mode of treatment - Individual (minutes) 60   OT Mode of treatment - Concurrent (minutes) 0   OT Mode of treatment - Group (minutes) 0   OT Mode of treatment - Co-treat (minutes) 0   OT Mode of Treatment - Total time(minutes) 60 minutes   OT Cumulative Minutes 505   Therapy Time missed   Time missed? No

## 2025-05-23 NOTE — ASSESSMENT & PLAN NOTE
Diagnosed in 07/2024.  Hx of radiation in 08/2024 and 01/2025.  Hx of hold on treatments due to multiple admission/toxicities/PCP pneumonia/PE.  Most recently receiving Taoxtere with plans to start chemotherapy after admission.    MRI brain showed stable enhancing lesions within the L superior occipital lobe and L parietal lobe with surrounding vasogenic edema, stable foci of restricted diffusion within the R posterior frontal lobe most likely sequela of subacute ischemia.  CT CAP showed multiple lesions with increase in size.    Continue increased dose of dexamethasone 3mg BID.  Decreased to 2mg BID on 5/23 after discussion between Primary team and Oncology.  Continue Bactrim -160mg MWF.  Continue Keppra 1000mg BID.    Follows with Dr. Castro (Oncology), Dr Vuong (Radiation/Oncology), and Palliative as outpatient.  Follow-up with Neurology in 4-6 weeks as outpatient.

## 2025-05-23 NOTE — ASSESSMENT & PLAN NOTE
Home regimen: amlodipine 5mg daily.  Continue amlodipine 10mg daily and lisinopril 5mg daily.  Monitor BP with routine VS.

## 2025-05-23 NOTE — PLAN OF CARE
Problem: SAFETY ADULT  Goal: Patient will remain free of falls  Description: INTERVENTIONS:  - Educate patient/family on patient safety including physical limitations  - Instruct patient to call for assistance with activity   - Keep Call bell within reach  - Keep bed low and locked with side rails adjusted as appropriate  - Keep care items and personal belongings within reach  - Initiate and maintain comfort rounds  - Make Fall Risk Sign visible to staff  - Offer Toileting every 2 Hours, in advance of need  - Initiate/Maintain bed/chair alarm  - Obtain necessary fall risk management equipment: bed/chair sensor  - Apply yellow socks and bracelet for high fall risk patients  - Consider moving patient to room near nurses station  Outcome: Progressing

## 2025-05-23 NOTE — PROGRESS NOTES
"   05/23/25 1015   Pain Assessment   Pain Assessment Tool 0-10   Pain Score 2   Pain Location/Orientation Orientation: Lower;Location: Back   Pain Radiating Towards n/A   Pain Onset/Description Onset: Ongoing   Effect of Pain on Daily Activities chronic   Patient's Stated Pain Goal No pain   Hospital Pain Intervention(s) Repositioned   Multiple Pain Sites No   Restrictions/Precautions   Precautions Bed/chair alarms;Cognitive;Fall Risk;Supervision on toilet/commode;Visual deficit   Weight Bearing Restrictions No   ROM Restrictions No   Lifestyle   Autonomy \"I was just resting my eyes\"   Lying to Sitting on Side of Bed   Type of Assistance Needed Physical assistance   Physical Assistance Level 26%-50%   Comment attempted with HOB flat. pt able to log roll. req modA for trunk mngmnt and use of bedrail   Lying to Sitting on Side of Bed CARE Score 3   Bed-Chair Transfer   Type of Assistance Needed Physical assistance;Adaptive equipment   Physical Assistance Level 26%-50%   Comment modA stand pivot with use of RW to L side   Chair/Bed-to-Chair Transfer CARE Score 3   Cognition   Overall Cognitive Status Impaired   Arousal/Participation Alert;Cooperative   Attention Attends with cues to redirect   Orientation Level Oriented X4   Memory Decreased short term memory;Decreased recall of precautions   Following Commands Follows one step commands with increased time or repetition   Activity Tolerance   Medical Staff Made Aware Provided pt with 16x16 rae height wc to increase wheelchair propulsion and mobility. pt completed wc mobility bedroom>PT gym but req modA/maxA to keep herself in the middle, Pt observed to vear to the Left alot. therapist provided wheelchair Left brake extender to increase indep with locking/unlocking wheelchair.   Assessment   Treatment Assessment Engaged pt in brief 15mins of skilled OT services with focus on bed mobility, SPT and wc mobility. Pt placed in 16x16 rae height wheelchair this session to " inc wc mobility and indep. Cont to req modA for stearing 2* to pt vearing to L side a lot. Cont OT POC with focus on LUE NMR/NPP, PROM, transfer training, activity tolerance, wc mobility to inc fx performance and indep.   Prognosis Fair   Problem List Decreased strength;Decreased endurance;Impaired balance;Decreased mobility;Decreased coordination;Decreased cognition;Impaired judgement;Decreased safety awareness;Decreased skin integrity   Barriers to Discharge Inaccessible home environment;Decreased caregiver support   Plan   Treatment/Interventions ADL retraining;Functional transfer training;Therapeutic exercise;Endurance training;Patient/family training;Bed mobility;Compensatory technique education   Progress Slow progress, decreased activity tolerance   Discharge Recommendation   Rehab Resource Intensity Level, OT   (STR)   OT Therapy Minutes   OT Time In 1015   OT Time Out 1030   OT Total Time (minutes) 15   OT Mode of treatment - Individual (minutes) 15   OT Mode of treatment - Concurrent (minutes) 0   OT Mode of treatment - Group (minutes) 0   OT Mode of treatment - Co-treat (minutes) 0   OT Mode of Treatment - Total time(minutes) 15 minutes   OT Cumulative Minutes 460   Therapy Time missed   Time missed? No

## 2025-05-23 NOTE — ASSESSMENT & PLAN NOTE
Current pain regimen: Scheduled Tylenol, gabapentin daily and qHS, Lidoderm, and PRN Robaxin   Continue to monitor pain levels and adjust as needed

## 2025-05-23 NOTE — ASSESSMENT & PLAN NOTE
Continue melatonin 6 mg qHS (increased 5/22) Seroquel 37.5 mg qHs (last increased 5/22), nighttime dose of gabapentin increased on 5/20  Remeron added 5/23 5/23: reached out to on-call oncology, Decadron decreased to 2 mg BID   Decadron dosing modified so second dose is given earlier   Encourage sleep hygiene (routine that promotes healthy circadian rhythm,avoid caffeine later in the day, quiet/dark room at night, reduce electronic before bedtime)

## 2025-05-23 NOTE — ASSESSMENT & PLAN NOTE
HPI:   Diagnosed in 07/2024, s/p radiation in 08/2024 and 01/2025. Treatments complicated by toxicities and infection.   Most recently receiving Taoxtere with plans to start chemotherapy after discharge   MRI brain showed stable enhancing lesions within the L superior occipital lobe and L parietal lobe with surrounding vasogenic edema, stable foci of restricted diffusion within the R posterior frontal lobe most likely sequela of subacute ischemia.  CT CAP showed multiple lesions with increase in size.    Plan:   Continue increased dose of dexamethasone 2mg BID.  Continue Bactrim -160mg MWF  Continue Keppra 1000mg BID  See pain insertion   Follows with Dr. Castro (Oncology), Dr Vuong (Radiation/Oncology), and Palliative as outpatient  Monitor for new or worsening pain, decreased ROM,  changes in strength, sensation, balance, and mentation,increased fatigue, SOB, edema, abdominal pain, nausea, vomiting, constipation, wt loss, worsening intake/appetite

## 2025-05-23 NOTE — PROGRESS NOTES
05/23/25 1230   Pain Assessment   Pain Assessment Tool 0-10   Pain Score 3   Pain Location/Orientation Location: Back   Hospital Pain Intervention(s) Rest;Repositioned  (pt reported that RN had previously provided her medication for this pain)   Restrictions/Precautions   Precautions Bed/chair alarms;Cognitive;Fall Risk;Supervision on toilet/commode;Visual deficit   Comprehension   Comprehension (FIM) 5 - Understands basic directions and conversation   Expression   Expression (FIM) 5 - Needs help/cues only RARELY (< 10% of the time)   Social Interaction   Social Interaction (FIM) 6 - Interacts appropriately with others BUT requires extra  time   Problem Solving   Problem solving (FIM) 4 - Solves basic problems 75-89% of time   Memory   Memory (FIM) 4 - Recognizes/recalls/performs 75-89%   Speech/Language/Cognition Assessment   Treatment Assessment Pt participated in skilled ST session focusing on cognitive linguistic skills. Began session by reviewing a conversation held from earlier in the week with this SLP about her ability to access her MyChart, which she was having difficulty with, as she uses this to assist in organization of appts. Pt reported that her daughter was able to correct the problem and that she now has access to her account again. Presented with a mock calendar which was filled in with various events/appts to target visual scanning, attention to detail and comprehension, pt accurately answered 9/9 verbally presented comprehension questions. Pt then completed a category exclusion task which also focused on working memory skills. Pt was verbally presented with a Fo4 words where she determined which word did not belong in the same categories as the others in 14/15 trials, which categories focused on concrete/basic concepts. Given min verbal cues, pt improved to 15/15 accuracy with task. Throughout task, pt demo ability to accurately recall target words across each trial for 14/15 trials, benefiting  from repetition of stimuli x1 to improve recall of target words in a single trial. Pt maintained good attention throughout task with prompter processing of info. Engaged in a problem solving category matrix task, pt was verbally presented with a category where she was able to provide target words which began with a specific given letter in 14/16 opportunities. Provided with moderate assist with the use of initial phonemic cues and verbal cues, pt increased to providing words in 16/16 opportunities. At this time, pt is recommended for and will continue to benefit from skilled SLP services targeting functional cognitive linguistic skills in order to maximize independence and reducing caregiver burden on discharge.   SLP Therapy Minutes   SLP Time In 1230   SLP Time Out 1300   SLP Total Time (minutes) 30   SLP Mode of treatment - Individual (minutes) 30   SLP Mode of treatment - Concurrent (minutes) 0   SLP Mode of treatment - Group (minutes) 0   SLP Mode of treatment - Co-treat (minutes) 0   SLP Mode of Treatment - Total time(minutes) 30 minutes   SLP Cumulative Minutes 225   Therapy Time missed   Time missed? No

## 2025-05-23 NOTE — PROGRESS NOTES
Progress Note - Internal Medicine   Name: Ayla Nye 74 y.o. 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  Hx of radiation in 08/2024 and 01/2025.  Hx of hold on treatments due to multiple admission/toxicities/PCP pneumonia/PE.  Most recently receiving Taoxtere with plans to start chemotherapy after admission.    MRI brain showed stable enhancing lesions within the L superior occipital lobe and L parietal lobe with surrounding vasogenic edema, stable foci of restricted diffusion within the R posterior frontal lobe most likely sequela of subacute ischemia.  CT CAP showed multiple lesions with increase in size.    Continue increased dose of dexamethasone 3mg BID.  Decreased to 2mg BID on 5/23 after discussion between Primary team and Oncology.  Continue Bactrim -160mg MWF.  Continue Keppra 1000mg BID.    Follows with Dr. Castro (Oncology), Dr Vuong (Radiation/Oncology), and Palliative as outpatient.  Follow-up with Neurology in 4-6 weeks as outpatient.  HTN (hypertension)  Home regimen: amlodipine 5mg daily.  Continue amlodipine 10mg daily and lisinopril 5mg daily.  Monitor BP with routine VS.  Insomnia  Continue Remeron 7.5mg at HS and Seroquel 37.5mg at HS.  Hypothyroidism  Continue home levothyroxine 50mcg daily.  TSH 0.408 and T4 0.79 on 5/12.  Recommend repeat TSH in 6 weeks as outpatient.  Anemia  Hgb currently stable at 9.3.  Baseline 9-10.  Continue home cyanocobalamin 1000mcg daily.  Asymptomatic.  Continue to trend routine CBC.  Leukocytosis  WBC count currently stable at 14.62.  No active s/s of infection.  Likely steroid induced.  Continue to trend routine CBC.  History of pulmonary embolism  Most recent CTA chest/PE study in 03/2025 showed no PE.  Had been on Xarelto in the past but discontinued due to new  foci of hemorrhage within the L occipital metastasis.  Monitor for s/s of reoccurrence.  GERD (gastroesophageal reflux disease)  Continue home Protonix 40mg daily.  Stroke-like symptoms  Presented on 25 after experiencing mechanical fall with head strike.  Hx of L sided weakness s/p stroke in 2025.  CTH showed vasogenic edema L parietal vertex compatible with known brain metastasis, no hemorrhage identified.  MRI brain showed stable enhancing lesions within the L superior occipital lobe and L parietal lobe with surrounding vasogenic edema, stable foci of restricted diffusion within the R posterior frontal lobe most likely sequela of subacute ischemia.    Per Neurology - symptoms likely due to some increase in size of CNS metastasis on top of noted vasogenic edema.  Home dexamethasone increased from 2mg BID to 3mg BID.  Dose decreased back to 2mg BID on .    Neurovascular checks Q shift.  Follow-up with Neurology in 4-6 weeks as outpatient.    Primary team following.  PT/OT/SLP.  History of stroke  R frontal subacute infarct in 2025.  Continue ASA 81mg daily and Lipitor 40mg daily.  Continue with PT/OT.  Follow-up with Neurovascular in 4-6 weeks as outpatient.    VTE Pharmacologic Prophylaxis:   Pharmacologic: Enoxaparin (Lovenox)  Mechanical VTE Prophylaxis in Place: Yes - sequential compression devices.    Current Length of Stay: 8 day(s)    Current Patient Status: Inpatient Rehab     Discharge Plan: As per primary team.    Code Status: Level 3 - DNAR and DNI    Subjective:   Pt examined while pt lying in bed in pt room.  Currently has complaints of lower back pain, 3/10, aching, improves with heating pad.  Still having difficulty sleeping at night.  Medications being adjusted per PM&R.  States that she participated better in therapy this morning.  Currently has no other concerns or complaints.    Objective:     Vitals:   Temp (24hrs), Av.9 °F (36.6 °C), Min:97.4 °F (36.3 °C), Max:98.8 °F (37.1  °C)    Temp:  [97.4 °F (36.3 °C)-98.8 °F (37.1 °C)] 98.8 °F (37.1 °C)  HR:  [74-91] 91  Resp:  [18] 18  BP: (120-151)/(74-81) 123/77  SpO2:  [92 %-94 %] 94 %  Body mass index is 21.45 kg/m².     Review of Systems   Constitutional:  Positive for fatigue. Negative for appetite change, chills and fever.   HENT:  Negative for trouble swallowing.    Eyes:  Negative for visual disturbance.   Respiratory:  Negative for cough, shortness of breath, wheezing and stridor.    Cardiovascular:  Negative for chest pain, palpitations and leg swelling.   Gastrointestinal:  Negative for abdominal distention, abdominal pain, constipation, diarrhea, nausea and vomiting.        LBM 5/22   Genitourinary:  Negative for difficulty urinating.   Musculoskeletal:  Positive for back pain (lower back pain, 3/10, aching, improves with heating pad). Negative for arthralgias and gait problem.   Neurological:  Negative for dizziness, weakness, light-headedness, numbness and headaches.   Psychiatric/Behavioral:  Positive for sleep disturbance. Negative for dysphoric mood. The patient is not nervous/anxious.    All other systems reviewed and are negative.       Input and Output Summary (last 24 hours):       Intake/Output Summary (Last 24 hours) at 5/23/2025 09  Last data filed at 5/23/2025 0755  Gross per 24 hour   Intake 550 ml   Output --   Net 550 ml       Physical Exam:     Physical Exam  Vitals and nursing note reviewed.   Constitutional:       General: She is not in acute distress.     Appearance: Normal appearance. She is not ill-appearing.   HENT:      Head: Normocephalic and atraumatic.     Cardiovascular:      Rate and Rhythm: Normal rate and regular rhythm.      Pulses: Normal pulses.      Heart sounds: Murmur heard.      Systolic murmur is present with a grade of 2/6.      No friction rub.   Pulmonary:      Effort: Pulmonary effort is normal. No respiratory distress.      Breath sounds: Examination of the right-lower field reveals  decreased breath sounds. Examination of the left-lower field reveals decreased breath sounds. Decreased breath sounds present. No wheezing or rhonchi.   Abdominal:      General: Abdomen is flat. Bowel sounds are normal. There is no distension.      Palpations: Abdomen is soft. There is no mass.      Tenderness: There is no abdominal tenderness. There is no guarding or rebound.      Hernia: No hernia is present.     Musculoskeletal:      Cervical back: Normal range of motion and neck supple. No tenderness.      Right lower leg: No edema.      Left lower leg: No edema.     Skin:     General: Skin is warm and dry.     Neurological:      Mental Status: She is alert and oriented to person, place, and time.     Psychiatric:         Mood and Affect: Mood normal.         Behavior: Behavior normal.         Additional Data:     Labs:    Results from last 7 days   Lab Units 05/22/25  0538   WBC Thousand/uL 14.62*   HEMOGLOBIN g/dL 9.3*   HEMATOCRIT % 31.5*   PLATELETS Thousands/uL 289   BANDS PCT % 2   LYMPHO PCT % 3*   MONO PCT % 5   EOS PCT % 0     Results from last 7 days   Lab Units 05/22/25  0538   SODIUM mmol/L 139   POTASSIUM mmol/L 4.3   CHLORIDE mmol/L 103   CO2 mmol/L 26   BUN mg/dL 29*   CREATININE mg/dL 0.80   ANION GAP mmol/L 10   CALCIUM mg/dL 8.9   GLUCOSE RANDOM mg/dL 108                       Labs reviewed    Imaging:    Imaging reviewed    Recent Cultures (last 7 days):           Last 24 Hours Medication List:   Current Facility-Administered Medications   Medication Dose Route Frequency Provider Last Rate    acetaminophen  975 mg Oral Daily Cristian Dias MD      And    acetaminophen  650 mg Oral Daily Cristian Dias MD      And    acetaminophen  975 mg Oral Daily Cristian Dias MD      And    acetaminophen  650 mg Oral Daily Cristian Dias MD      aluminum-magnesium hydroxide-simethicone  30 mL Oral Q4H PRN Claudine Leos PA-C      amLODIPine  10 mg Oral Daily ZULEYMA Viramontes       aspirin  81 mg Oral Daily Claudine Leos PA-C      atorvastatin  40 mg Oral Daily With Dinner Claudine Leos PA-C      vitamin B-12  1,000 mcg Oral Daily Claudine Leos PA-C      dexamethasone  3 mg Oral BID Cristian Dias MD      Diclofenac Sodium  2 g Topical 4x Daily Claudine Leos PA-C      enoxaparin  40 mg Subcutaneous Q24H Claudine Leos PA-C      gabapentin  200 mg Oral Daily Claudine Leos PA-C      And    gabapentin  400 mg Oral HS Claudine Leos PA-C      levETIRAcetam  1,000 mg Oral BID Claudine Leos PA-C      levothyroxine  50 mcg Oral Early Morning Claudine Leos PA-C      lidocaine  1 patch Topical Daily Claudine Leos PA-C      lisinopril  5 mg Oral Daily ZULEYMA Viramontes      melatonin  6 mg Oral HS Claudine Leos PA-C      methocarbamol  250 mg Oral Q6H PRN Claudine Leos PA-C      pantoprazole  40 mg Oral Daily Before Breakfast Claudine Leos PA-C      QUEtiapine  37.5 mg Oral HS Claudine Leos PA-C      senna  1 tablet Oral Daily Claudine Leos PA-C      sulfamethoxazole-trimethoprim  1 tablet Oral Once per day on Monday Wednesday Friday Claudine Leos PA-C          M*Modal software was used to dictate this note.  It may contain errors with dictating incorrect words or incorrect spelling. Please contact the provider directly with any questions.

## 2025-05-23 NOTE — PLAN OF CARE
Problem: PAIN - ADULT  Goal: Verbalizes/displays adequate comfort level or baseline comfort level  Description: Interventions:  - Encourage patient to monitor pain and request assistance  - Assess pain using appropriate pain scale  - Administer analgesics as ordered based on type and severity of pain and evaluate response  - Implement non-pharmacological measures as appropriate and evaluate response  - Consider cultural and social influences on pain and pain management  - Notify physician/advanced practitioner if interventions unsuccessful or patient reports new pain  - Educate patient/family on pain management process including their role and importance of  reporting pain   - Provide non-pharmacologic/complimentary pain relief interventions  Outcome: Progressing     Problem: SAFETY ADULT  Goal: Patient will remain free of falls  Description: INTERVENTIONS:  - Educate patient/family on patient safety including physical limitations  - Instruct patient to call for assistance with activity   - Keep Call bell within reach  - Keep bed low and locked with side rails adjusted as appropriate  - Keep care items and personal belongings within reach  - Initiate and maintain comfort rounds  - Make Fall Risk Sign visible to staff  - Offer Toileting every 2 Hours, in advance of need  - Initiate/Maintain bed/chair alarm  - Obtain necessary fall risk management equipment: bed/chair sensor  - Apply yellow socks and bracelet for high fall risk patients  - Consider moving patient to room near nurses station  Outcome: Progressing     Problem: GASTROINTESTINAL - ADULT  Goal: Minimal or absence of nausea and/or vomiting  Description: INTERVENTIONS:  - Administer IV fluids if ordered to ensure adequate hydration  - Provide nonpharmacologic comfort measures as appropriate  - Consider nutrition services referral to assist patient with adequate nutrition and appropriate food choices  Outcome: Progressing     Problem: MUSCULOSKELETAL -  ADULT  Goal: Maintain or return mobility to safest level of function  Description: INTERVENTIONS:  - Assess patient's ability to carry out ADLs; assess patient's baseline for ADL function and identify physical deficits which impact ability to perform ADLs (bathing, care of mouth/teeth, toileting, grooming, dressing, etc.)  - Assess/evaluate cause of self-care deficits   - Assess range of motion  - Assess patient's mobility  - Assess patient's need for assistive devices and provide as appropriate  - Encourage maximum independence but intervene and supervise when necessary  - Involve family in performance of ADLs  - Assess for home care needs following discharge   - Provide patient education as appropriate  Outcome: Progressing

## 2025-05-24 PROCEDURE — 97530 THERAPEUTIC ACTIVITIES: CPT | Performed by: PHYSICAL THERAPIST

## 2025-05-24 PROCEDURE — 97110 THERAPEUTIC EXERCISES: CPT | Performed by: PHYSICAL THERAPIST

## 2025-05-24 PROCEDURE — 97535 SELF CARE MNGMENT TRAINING: CPT

## 2025-05-24 PROCEDURE — 97116 GAIT TRAINING THERAPY: CPT | Performed by: PHYSICAL THERAPIST

## 2025-05-24 PROCEDURE — 97110 THERAPEUTIC EXERCISES: CPT

## 2025-05-24 RX ADMIN — AMLODIPINE BESYLATE 10 MG: 10 TABLET ORAL at 08:59

## 2025-05-24 RX ADMIN — ACETAMINOPHEN 975 MG: 325 TABLET, FILM COATED ORAL at 05:44

## 2025-05-24 RX ADMIN — LEVETIRACETAM 1000 MG: 500 TABLET, FILM COATED ORAL at 09:00

## 2025-05-24 RX ADMIN — LEVETIRACETAM 1000 MG: 500 TABLET, FILM COATED ORAL at 20:08

## 2025-05-24 RX ADMIN — ENOXAPARIN SODIUM 40 MG: 40 INJECTION SUBCUTANEOUS at 15:44

## 2025-05-24 RX ADMIN — ATORVASTATIN CALCIUM 40 MG: 40 TABLET, FILM COATED ORAL at 17:41

## 2025-05-24 RX ADMIN — CYANOCOBALAMIN TAB 1000 MCG 1000 MCG: 1000 TAB at 08:59

## 2025-05-24 RX ADMIN — METHOCARBAMOL TABLETS 250 MG: 500 TABLET, COATED ORAL at 12:06

## 2025-05-24 RX ADMIN — ASPIRIN 81 MG CHEWABLE TABLET 81 MG: 81 TABLET CHEWABLE at 09:00

## 2025-05-24 RX ADMIN — LEVOTHYROXINE SODIUM 50 MCG: 0.05 TABLET ORAL at 05:45

## 2025-05-24 RX ADMIN — PANTOPRAZOLE SODIUM 40 MG: 40 TABLET, DELAYED RELEASE ORAL at 06:02

## 2025-05-24 RX ADMIN — SENNOSIDES 8.6 MG: 8.6 TABLET, FILM COATED ORAL at 09:00

## 2025-05-24 RX ADMIN — LISINOPRIL 5 MG: 5 TABLET ORAL at 08:59

## 2025-05-24 RX ADMIN — LIDOCAINE 5% 1 PATCH: 700 PATCH TOPICAL at 19:53

## 2025-05-24 RX ADMIN — ALUMINUM HYDROXIDE, MAGNESIUM HYDROXIDE, AND DIMETHICONE 30 ML: 200; 20; 200 SUSPENSION ORAL at 19:33

## 2025-05-24 RX ADMIN — ACETAMINOPHEN 650 MG: 325 TABLET, FILM COATED ORAL at 00:12

## 2025-05-24 RX ADMIN — GABAPENTIN 400 MG: 400 CAPSULE ORAL at 21:26

## 2025-05-24 RX ADMIN — MIRTAZAPINE 7.5 MG: 7.5 TABLET, FILM COATED ORAL at 19:33

## 2025-05-24 RX ADMIN — QUETIAPINE FUMARATE 37.5 MG: 25 TABLET ORAL at 21:26

## 2025-05-24 RX ADMIN — ACETAMINOPHEN 975 MG: 325 TABLET, FILM COATED ORAL at 17:41

## 2025-05-24 RX ADMIN — DEXAMETHASONE 2 MG: 4 TABLET ORAL at 15:44

## 2025-05-24 RX ADMIN — ACETAMINOPHEN 650 MG: 325 TABLET, FILM COATED ORAL at 12:04

## 2025-05-24 RX ADMIN — METHOCARBAMOL TABLETS 250 MG: 500 TABLET, COATED ORAL at 21:26

## 2025-05-24 RX ADMIN — GABAPENTIN 200 MG: 100 CAPSULE ORAL at 09:00

## 2025-05-24 RX ADMIN — DEXAMETHASONE 2 MG: 4 TABLET ORAL at 06:03

## 2025-05-24 NOTE — PROGRESS NOTES
05/24/25 0700   Pain Assessment   Pain Score No Pain   Restrictions/Precautions   Precautions Bed/chair alarms;Cognitive;Fall Risk;Supervision on toilet/commode;Visual deficit   Weight Bearing Restrictions No   ROM Restrictions No   Eating   Type of Assistance Needed Set-up / clean-up   Comment provided breakfast   Eating CARE Score 5   Oral Hygiene   Type of Assistance Needed Set-up / clean-up   Comment seated wc at sink   Oral Hygiene CARE Score 5   Shower/Bathe Self   Type of Assistance Needed Physical assistance   Physical Assistance Level 25% or less   Comment completed shower seated on shower chair pt able to wash 8/10 parts. assistance provided to wash RUE, CGA in stance and TA for buttocks. encouraged to use LUE when washing R side   Shower/Bathe Self CARE Score 3   Tub/Shower Transfer   Findings Myla SPT with use of Gb wc> shower chair   Upper Body Dressing   Type of Assistance Needed Supervision   Comment pt with G carryover to thread LUE first but becoming frustrated. educ pt to use R hand and thread through L arm and bring L arm thorugh which pt was able to complete. inc time to adjust shirt   Upper Body Dressing CARE Score 4   Lower Body Dressing   Type of Assistance Needed Physical assistance   Physical Assistance Level 26%-50%   Comment required assistance to thread LLE thorugh undergarent/pants. pt with P problem solving skills, pt would partially thread LLE but cont to pull and leg would get stuck. modA in stance for steadying and TA for CM   Lower Body Dressing CARE Score 3   Putting On/Taking Off Footwear   Type of Assistance Needed Physical assistance   Physical Assistance Level 25% or less   Comment Myla to thread L sock. pt able to thread R sock and sneakers   Putting On/Taking Off Footwear CARE Score 3   Lying to Sitting on Side of Bed   Type of Assistance Needed Physical assistance   Physical Assistance Level 26%-50%   Comment modA trunk mnmgnt   Lying to Sitting on Side of Bed CARE Score  3   Sit to Stand   Type of Assistance Needed Physical assistance;Adaptive equipment   Physical Assistance Level 25% or less   Comment Myla with RW   Sit to Stand CARE Score 3   Bed-Chair Transfer   Type of Assistance Needed Physical assistance   Physical Assistance Level 25% or less   Comment Myla SPT with RW   Chair/Bed-to-Chair Transfer CARE Score 3   Therapeutic Exercise - ROM   UE-ROM Yes   ROM - Left Upper Extremities    LUE ROM Comment seated at table engaged pt in LUE towel slides. Completed shoulder flex/ext, V/s and circles. Req modA to stabilize LUE and achieve full ROM. Completed 3x10 with rest break in between   Exercise Tools   Other Exercise Tool 1 seated engaged pt in RUE TE with 2# 2x15 to cont to inc fx strength for imporved STS, pt tolerated well with rest break   Cognition   Overall Cognitive Status Impaired   Arousal/Participation Alert;Cooperative   Attention Attends with cues to redirect   Orientation Level Oriented X4   Memory Decreased short term memory;Decreased recall of precautions   Following Commands Follows one step commands with increased time or repetition   Activity Tolerance   Activity Tolerance Patient limited by fatigue;Patient tolerated treatment well   Assessment   Treatment Assessment Engaged pt in 90mins of skilled OT services with focus on ADL, LUE PROM and RUE TE. Pt tolerated shower well, cont to req assistance to wash R side of thread LE clothing 2* to limited use of RUE. Pt at times becoming frustrated easily when unable to thread clothing on R side and required asssitance. Dtr Sue present during session and observing. Pt cont to demonstrate deficits in LUE strength/ROM, activity tolerance, standing balance and overall fx use of LUE. Cont OT POC with focus on the above deficits to inc fx performance. DC pending STR bed available.   Prognosis Fair   Problem List Decreased strength;Decreased endurance;Impaired balance;Decreased mobility;Decreased coordination;Decreased  cognition;Impaired judgement;Decreased safety awareness;Decreased skin integrity   Barriers to Discharge Decreased caregiver support;Inaccessible home environment   Plan   Treatment/Interventions Functional transfer training;ADL retraining;Therapeutic exercise;Endurance training;Patient/family training;Bed mobility;Compensatory technique education   Progress Progressing toward goals   Discharge Recommendation   Rehab Resource Intensity Level, OT   (STR)   OT Therapy Minutes   OT Time In 0700   OT Time Out 0830   OT Total Time (minutes) 90   OT Mode of treatment - Individual (minutes) 90   OT Mode of treatment - Concurrent (minutes) 0   OT Mode of treatment - Group (minutes) 0   OT Mode of treatment - Co-treat (minutes) 0   OT Mode of Treatment - Total time(minutes) 90 minutes   OT Cumulative Minutes 610   Therapy Time missed   Time missed? No

## 2025-05-24 NOTE — PROGRESS NOTES
05/24/25 1000   Pain Assessment   Pain Assessment Tool 0-10   Pain Score No Pain   Restrictions/Precautions   Precautions Bed/chair alarms;Cognitive;Fall Risk;Supervision on toilet/commode;Visual deficit   Weight Bearing Restrictions No   ROM Restrictions No   Cognition   Overall Cognitive Status Impaired   Arousal/Participation Alert;Cooperative   Attention Attends with cues to redirect   Orientation Level Oriented X4   Memory Decreased short term memory   Following Commands Follows one step commands with increased time or repetition   Subjective   Subjective Patient reported that she was fatigued from OT session this morning and poor sleep last night.   Sit to Lying   Type of Assistance Needed Supervision   Physical Assistance Level No physical assistance   Sit to Lying CARE Score 4   Lying to Sitting on Side of Bed   Type of Assistance Needed Physical assistance   Physical Assistance Level 26%-50%   Lying to Sitting on Side of Bed CARE Score 3   Sit to Stand   Type of Assistance Needed Physical assistance   Physical Assistance Level 25% or less   Sit to Stand CARE Score 3   Bed-Chair Transfer   Type of Assistance Needed Physical assistance   Physical Assistance Level 25% or less   Chair/Bed-to-Chair Transfer CARE Score 3   Walk 10 Feet   Type of Assistance Needed Physical assistance   Physical Assistance Level 26%-50%   Comment Hand hold assist x1 to bathroom; completed a total of 10' walk 3x during session   Walk 10 Feet CARE Score 3   Ambulation   Primary Mode of Locomotion Prior to Admission Walk   Distance Walked (feet) 30 ft  (10x3)   Assist Device Hand Hold  (completed a total of 10' walk 3x during session)   Findings Attempted hand hold ambulation to bathroom for toilet transfer, patient was very fatigued after walk and requested WC; completed a total of 10' walk 3x during session   Does the patient walk? 2. Yes   Wheel 50 Feet with Two Turns   Type of Assistance Needed Physical assistance   Physical  Assistance Level 76% or more   Comment assistance due to difficulty with L sided usage and fatigue   Wheel 50 Feet with Two Turns CARE Score 2   Wheel 150 Feet   Type of Assistance Needed Physical assistance   Physical Assistance Level 76% or more   Comment assistance due to difficulty with L sided usage and fatigue   Wheel 150 Feet CARE Score 2   Wheelchair mobility   Method Right lower extremity;Right upper extremity   Assistance Provided For Curbs;Locking Brakes;Obstacles;Press Release;Remove Leg Rest;Replace Leg Rest;Remove armrests;Replace armrests   Distance Level Surface (feet) 150 ft   Findings Used for when fatigued   Does the patient use a wheelchair? 1. Yes   Type of Wheelchair Used 1. Manual   Toilet Transfer   Type of Assistance Needed Physical assistance   Physical Assistance Level 26%-50%   Comment Assistance with clothing donning and doffing, and transfer back to  after with monitoring L LE   Toilet Transfer CARE Score 3   Therapeutic Interventions   Strengthening Supine mat program 20 reps 3 second holds no weights today per patient request, completed bilaterally - Quad sets, glute sets, adduction squeezes, SAQ, SLR, Bridges, Supine clamshell; Seated at edge of mat- LAQ, hip flexion- 20 reps, 3 second holds bilaterally   Flexibility Seated hamstring and gastroc flexibility bilaterally- 10'   Other Ambulation, transfers, bed mobility, and functional activities per objective.   Equipment Use   NuStep level 1 10 minutes bilateral UE and bilateral LE,  L hand nu step wrap; .21 miles, 42 spm   Assessment   Treatment Assessment Patient tolerated session well today, patient very fatigued this morning upon arrival due to her poor sleep over the past few days including last night. Patient improving with capabilities of her hand hold support with ambulation but weakness and fatigue noted, path deviation occurs with decreased foot clearance. Patient challenged with upright posture and core control with  seated at the edge of mat today with her interventions. Patient reported and demonstrated that she favors her R LE when her L LE becomes fatgued. Patient improving with exercise tolerance but limited by fatigue potentially due to her primary dx as well as her poor sleep quality per her reports. Patient requires skilled PT to maximize function.   Family/Caregiver Present no   PT Family training done with: no   Problem List Decreased strength;Decreased endurance;Impaired balance;Decreased mobility;Decreased coordination;Decreased cognition;Impaired judgement;Decreased safety awareness;Decreased skin integrity   Barriers to Discharge Inaccessible home environment;Decreased caregiver support   PT Barriers   Functional Limitation Car transfers;Stair negotiation;Standing;Transfers;Walking   Plan   Treatment/Interventions ADL retraining;Functional transfer training;Therapeutic exercise;Endurance training;Patient/family training;Bed mobility;Compensatory technique education   Progress Progressing toward goals   PT Therapy Minutes   PT Time In 1000   PT Time Out 1130   PT Total Time (minutes) 90   PT Mode of treatment - Individual (minutes) 90   PT Mode of treatment - Concurrent (minutes) 0   PT Mode of treatment - Group (minutes) 0   PT Mode of treatment - Co-treat (minutes) 0   PT Mode of Treatment - Total time(minutes) 90 minutes   PT Cumulative Minutes 540   Therapy Time missed   Time missed? No

## 2025-05-25 PROCEDURE — 97530 THERAPEUTIC ACTIVITIES: CPT | Performed by: PHYSICAL THERAPIST

## 2025-05-25 PROCEDURE — 97112 NEUROMUSCULAR REEDUCATION: CPT | Performed by: PHYSICAL THERAPIST

## 2025-05-25 PROCEDURE — 97116 GAIT TRAINING THERAPY: CPT | Performed by: PHYSICAL THERAPIST

## 2025-05-25 PROCEDURE — 99232 SBSQ HOSP IP/OBS MODERATE 35: CPT | Performed by: INTERNAL MEDICINE

## 2025-05-25 RX ORDER — MAGNESIUM HYDROXIDE/ALUMINUM HYDROXICE/SIMETHICONE 120; 1200; 1200 MG/30ML; MG/30ML; MG/30ML
30 SUSPENSION ORAL EVERY 4 HOURS PRN
Status: DISCONTINUED | OUTPATIENT
Start: 2025-05-25 | End: 2025-05-30 | Stop reason: HOSPADM

## 2025-05-25 RX ORDER — POLYETHYLENE GLYCOL 3350 17 G/17G
17 POWDER, FOR SOLUTION ORAL DAILY PRN
Status: DISCONTINUED | OUTPATIENT
Start: 2025-05-25 | End: 2025-05-30 | Stop reason: HOSPADM

## 2025-05-25 RX ORDER — HYDROMORPHONE HYDROCHLORIDE 2 MG/1
2 TABLET ORAL EVERY 4 HOURS PRN
Refills: 0 | Status: DISCONTINUED | OUTPATIENT
Start: 2025-05-25 | End: 2025-05-30 | Stop reason: HOSPADM

## 2025-05-25 RX ORDER — SENNOSIDES 8.6 MG
2 TABLET ORAL DAILY
Status: DISCONTINUED | OUTPATIENT
Start: 2025-05-26 | End: 2025-05-30 | Stop reason: HOSPADM

## 2025-05-25 RX ORDER — NALOXONE HYDROCHLORIDE 1 MG/ML
2 INJECTION INTRAMUSCULAR; INTRAVENOUS; SUBCUTANEOUS DAILY PRN
Status: DISCONTINUED | OUTPATIENT
Start: 2025-05-25 | End: 2025-05-30 | Stop reason: HOSPADM

## 2025-05-25 RX ADMIN — PANTOPRAZOLE SODIUM 40 MG: 40 TABLET, DELAYED RELEASE ORAL at 06:27

## 2025-05-25 RX ADMIN — SENNOSIDES 8.6 MG: 8.6 TABLET, FILM COATED ORAL at 08:43

## 2025-05-25 RX ADMIN — ASPIRIN 81 MG CHEWABLE TABLET 81 MG: 81 TABLET CHEWABLE at 08:42

## 2025-05-25 RX ADMIN — LISINOPRIL 5 MG: 5 TABLET ORAL at 08:43

## 2025-05-25 RX ADMIN — CYANOCOBALAMIN TAB 1000 MCG 1000 MCG: 1000 TAB at 08:43

## 2025-05-25 RX ADMIN — HYDROMORPHONE HYDROCHLORIDE 2 MG: 2 TABLET ORAL at 22:16

## 2025-05-25 RX ADMIN — DEXAMETHASONE 2 MG: 4 TABLET ORAL at 14:21

## 2025-05-25 RX ADMIN — GABAPENTIN 200 MG: 100 CAPSULE ORAL at 08:43

## 2025-05-25 RX ADMIN — MIRTAZAPINE 7.5 MG: 7.5 TABLET, FILM COATED ORAL at 20:13

## 2025-05-25 RX ADMIN — LEVOTHYROXINE SODIUM 50 MCG: 0.05 TABLET ORAL at 05:56

## 2025-05-25 RX ADMIN — ACETAMINOPHEN 650 MG: 325 TABLET, FILM COATED ORAL at 11:51

## 2025-05-25 RX ADMIN — ACETAMINOPHEN 975 MG: 325 TABLET, FILM COATED ORAL at 17:19

## 2025-05-25 RX ADMIN — HYDROMORPHONE HYDROCHLORIDE 2 MG: 2 TABLET ORAL at 14:21

## 2025-05-25 RX ADMIN — LEVETIRACETAM 1000 MG: 500 TABLET, FILM COATED ORAL at 20:14

## 2025-05-25 RX ADMIN — ATORVASTATIN CALCIUM 40 MG: 40 TABLET, FILM COATED ORAL at 17:19

## 2025-05-25 RX ADMIN — LEVETIRACETAM 1000 MG: 500 TABLET, FILM COATED ORAL at 08:43

## 2025-05-25 RX ADMIN — DEXAMETHASONE 2 MG: 4 TABLET ORAL at 06:27

## 2025-05-25 RX ADMIN — ACETAMINOPHEN 650 MG: 325 TABLET, FILM COATED ORAL at 04:33

## 2025-05-25 RX ADMIN — ALUMINUM HYDROXIDE, MAGNESIUM HYDROXIDE, AND DIMETHICONE 30 ML: 200; 20; 200 SUSPENSION ORAL at 22:16

## 2025-05-25 RX ADMIN — QUETIAPINE FUMARATE 37.5 MG: 25 TABLET ORAL at 22:18

## 2025-05-25 RX ADMIN — ENOXAPARIN SODIUM 40 MG: 40 INJECTION SUBCUTANEOUS at 14:21

## 2025-05-25 RX ADMIN — GABAPENTIN 400 MG: 400 CAPSULE ORAL at 22:18

## 2025-05-25 RX ADMIN — AMLODIPINE BESYLATE 10 MG: 10 TABLET ORAL at 08:42

## 2025-05-25 NOTE — PLAN OF CARE
Problem: PAIN - ADULT  Goal: Verbalizes/displays adequate comfort level or baseline comfort level  Description: Interventions:  - Encourage patient to monitor pain and request assistance  - Assess pain using appropriate pain scale  - Administer analgesics as ordered based on type and severity of pain and evaluate response  - Implement non-pharmacological measures as appropriate and evaluate response  - Consider cultural and social influences on pain and pain management  - Notify physician/advanced practitioner if interventions unsuccessful or patient reports new pain  - Educate patient/family on pain management process including their role and importance of  reporting pain   - Provide non-pharmacologic/complimentary pain relief interventions  Outcome: Progressing     Problem: SAFETY ADULT  Goal: Patient will remain free of falls  Description: INTERVENTIONS:  - Educate patient/family on patient safety including physical limitations  - Instruct patient to call for assistance with activity   - Keep Call bell within reach  - Keep bed low and locked with side rails adjusted as appropriate  - Keep care items and personal belongings within reach  - Initiate and maintain comfort rounds  - Make Fall Risk Sign visible to staff  - Offer Toileting every 2 Hours, in advance of need  - Initiate/Maintain bed/chair alarm  - Obtain necessary fall risk management equipment: bed/chair sensor  - Apply yellow socks and bracelet for high fall risk patients  - Consider moving patient to room near nurses station  Outcome: Progressing     Problem: Knowledge Deficit  Goal: Patient/family/caregiver demonstrates understanding of disease process, treatment plan, medications, and discharge instructions  Description: Complete learning assessment and assess knowledge base.  Interventions:  - Provide teaching at level of understanding  - Provide teaching via preferred learning methods  Outcome: Progressing     Problem: NEUROSENSORY - ADULT  Goal:  Achieves stable or improved neurological status  Description: INTERVENTIONS  - Monitor and report changes in neurological status  - Monitor vital signs such as temperature, blood pressure and any other labs ordered       Outcome: Progressing     Problem: GENITOURINARY - ADULT  Goal: Maintains or returns to baseline urinary function  Description: INTERVENTIONS:  - Assess urinary function  - Encourage oral fluids to ensure adequate hydration if ordered  - Administer IV fluids as ordered to ensure adequate hydration  - Administer ordered medications as needed  - Offer frequent toileting  - Follow urinary retention protocol if ordered  Outcome: Progressing     Problem: METABOLIC, FLUID AND ELECTROLYTES - ADULT  Goal: Fluid balance maintained  Description: INTERVENTIONS:  - Monitor labs   - Monitor I/O and WT  - Instruct patient on fluid and nutrition as appropriate  - Assess for signs & symptoms of volume excess or deficit  Outcome: Progressing

## 2025-05-25 NOTE — PROGRESS NOTES
Physical Medicine and Rehabilitation Progress Note  Ayla Nye 74 y.o. female MRN: [id number]  Unit/Bed#: [facility] 262-01 Encounter: [id number]    Chief Complaint:  pain    Interval History: No acute events overnight.  Endorsing pain to her back Patient reports that she was prescribed dilaudid 2mg at home that she was told by her palliative care physician that she could take 2-4 as needed for her pain. Usually taking 4mg HS. Did have improved sleep on mirazepine and steroid adjustment    Assessment/Plan - Continue plan of care as per primary attending, unless otherwise stated below:      Rehab - Continue PT, OT, and SLP  Bowel - LBM 5/21 inc senna to 2 tabs daily and miralax PRN   Bladder - Patient voiding spontaneously  Pain - Per PDMP patient receiving hydromorphone 2mg 90tabs 15 days last filled 3/24 from palliative care. Restart as hydromorphone 2 mg Q4h PRN. Pt notes she mainly takes 4mg at night. Cw scheduled tylenol; monitor for excess sedation; narcan PRN for opiod toxicity ordered   Sleep: cw mirtazepine 7.5 HS; did not improved sleep overnight. Deferred melatonin due to odd dreams    Nori Goodwin MD  Physical Medicine and Rehabilitation    Review of Systems:  Patient denied any f/c/n/v, CP, SOB, abdominal pain.     Current Medications[1]    Physical Exam:  Temp:  [97.2 °F (36.2 °C)-98.5 °F (36.9 °C)] 97.2 °F (36.2 °C)  HR:  [] 87  Resp:  [18-19] 18  BP: (124-138)/(61-80) 124/77  SpO2:  [90 %-94 %] 94 %    Physical Exam    Gen: No acute distress,   HEENT: Moist mucus membranes,   Cardiovascular: No edema  Pulmonary: Non-labored breathing.on RA   : No ruelas  GI:  non-distended.  MSK: moving UE spontaneously   Integumentary: Skin is warm, dry. .  Neuro: Speech is intact. Appropriate to questioning.  Psych: Normal mood and affect.       Laboratory:  Labs reviewed  Results from last 7 days   Lab Units 05/22/25  0538 05/19/25  0523   HEMOGLOBIN g/dL 9.3* 10.3*   HEMATOCRIT % 31.5* 34.1*   WBC  Thousand/uL 14.62* 15.97*     Results from last 7 days   Lab Units 05/22/25  0538 05/19/25  0523   BUN mg/dL 29* 20   SODIUM mmol/L 139 140   POTASSIUM mmol/L 4.3 4.7   CHLORIDE mmol/L 103 104   CREATININE mg/dL 0.80 0.82            Diagnostic Studies: Reviewed, no new imaging   No orders to display       I have spent a total time of 35 minutes in caring for this patient on the day of the visit/encounter including Counseling / Coordination of care, Documenting in the medical record, and Communicating with other healthcare professionals .         [1]   Current Facility-Administered Medications:     acetaminophen (TYLENOL) tablet 975 mg, 975 mg, Oral, Daily, 975 mg at 05/24/25 0544 **AND** acetaminophen (TYLENOL) tablet 650 mg, 650 mg, Oral, Daily, 650 mg at 05/25/25 1151 **AND** acetaminophen (TYLENOL) tablet 975 mg, 975 mg, Oral, Daily, 975 mg at 05/24/25 1741 **AND** acetaminophen (TYLENOL) tablet 650 mg, 650 mg, Oral, Daily, Cristian Dias MD, 650 mg at 05/25/25 0433    aluminum-magnesium hydroxide-simethicone (MAALOX) oral suspension 30 mL, 30 mL, Oral, Q4H PRN, Claudine Leos PA-C, 30 mL at 05/24/25 1933    amLODIPine (NORVASC) tablet 10 mg, 10 mg, Oral, Daily, ZULEYMA Viramontes, 10 mg at 05/25/25 0842    aspirin chewable tablet 81 mg, 81 mg, Oral, Daily, Claudine eLos PA-C, 81 mg at 05/25/25 0842    atorvastatin (LIPITOR) tablet 40 mg, 40 mg, Oral, Daily With Dinner, Claudine Leos PA-C, 40 mg at 05/24/25 1741    cyanocobalamin (VITAMIN B-12) tablet 1,000 mcg, 1,000 mcg, Oral, Daily, Claudine Leos PA-C, 1,000 mcg at 05/25/25 0843    dexamethasone (DECADRON) tablet 2 mg, 2 mg, Oral, BID, Claudine Leos PA-C, 2 mg at 05/25/25 1421    Diclofenac Sodium (VOLTAREN) 1 % topical gel 2 g, 2 g, Topical, 4x Daily, Claudine Leos PA-C, 2 g at 05/23/25 1221    enoxaparin (LOVENOX) subcutaneous injection 40 mg, 40 mg, Subcutaneous, Q24H, Claudine Leos PA-C, 40 mg at 05/25/25 1421    gabapentin  (NEURONTIN) capsule 200 mg, 200 mg, Oral, Daily, 200 mg at 05/25/25 0843 **AND** gabapentin (NEURONTIN) capsule 400 mg, 400 mg, Oral, HS, Claudine Leos PA-C, 400 mg at 05/24/25 2126    HYDROmorphone (DILAUDID) tablet 2 mg, 2 mg, Oral, Q4H PRN, Nori Goodwin MD, 2 mg at 05/25/25 1421    levETIRAcetam (KEPPRA) tablet 1,000 mg, 1,000 mg, Oral, BID, Claudine Leos PA-C, 1,000 mg at 05/25/25 0843    levothyroxine tablet 50 mcg, 50 mcg, Oral, Early Morning, Claudine Leos PA-C, 50 mcg at 05/25/25 0556    lidocaine (LIDODERM) 5 % patch 1 patch, 1 patch, Topical, Daily, Claudine Leos PA-C, 1 patch at 05/24/25 1953    lisinopril (ZESTRIL) tablet 5 mg, 5 mg, Oral, Daily, ZULEYMA Viramontes, 5 mg at 05/25/25 0843    methocarbamol (ROBAXIN) tablet 250 mg, 250 mg, Oral, Q6H PRN, Claudine Leos PA-C, 250 mg at 05/24/25 2126    mirtazapine (REMERON) tablet 7.5 mg, 7.5 mg, Oral, HS, Cristian Dias MD, 7.5 mg at 05/24/25 1933    pantoprazole (PROTONIX) EC tablet 40 mg, 40 mg, Oral, Daily Before Breakfast, Claudine Leos PA-C, 40 mg at 05/25/25 0627    QUEtiapine (SEROquel) tablet 37.5 mg, 37.5 mg, Oral, HS, Claudine Leos PA-C, 37.5 mg at 05/24/25 2126    senna (SENOKOT) tablet 8.6 mg, 1 tablet, Oral, Daily, Claudine Leos PA-C, 8.6 mg at 05/25/25 0843    sulfamethoxazole-trimethoprim (BACTRIM DS) 800-160 mg per tablet 1 tablet, 1 tablet, Oral, Once per day on Monday Wednesday Friday, Claudine Leos PA-C, 1 tablet at 05/23/25 08

## 2025-05-25 NOTE — PROGRESS NOTES
05/25/25 1225   Pain Assessment   Pain Assessment Tool 0-10   Pain Score 5   Pain Location/Orientation Location: Back   Hospital Pain Intervention(s) Heat applied;Rest;Repositioned   Restrictions/Precautions   Precautions Bed/chair alarms;Cognitive;Fall Risk;Supervision on toilet/commode;Visual deficit   Weight Bearing Restrictions No   ROM Restrictions No   Cognition   Arousal/Participation Alert;Cooperative   Subjective   Subjective Pt reports 5/10 back pain, otherwise no new complaints   Sit to Lying   Type of Assistance Needed Supervision   Physical Assistance Level No physical assistance   Sit to Lying CARE Score 4   Lying to Sitting on Side of Bed   Type of Assistance Needed Physical assistance   Physical Assistance Level 25% or less   Lying to Sitting on Side of Bed CARE Score 3   Sit to Stand   Type of Assistance Needed Physical assistance   Physical Assistance Level 25% or less   Sit to Stand CARE Score 3   Bed-Chair Transfer   Type of Assistance Needed Physical assistance   Physical Assistance Level 25% or less   Chair/Bed-to-Chair Transfer CARE Score 3   Walk 10 Feet   Type of Assistance Needed Physical assistance   Physical Assistance Level Total assistance   Walk 10 Feet CARE Score 1   Walk 50 Feet with Two Turns   Reason if not Attempted Safety concerns   Walk 50 Feet with Two Turns CARE Score 88   Walk 150 Feet   Reason if not Attempted Safety concerns   Walk 150 Feet CARE Score 88   Walking 10 Feet on Uneven Surfaces   Reason if not Attempted Safety concerns   Walking 10 Feet on Uneven Surfaces CARE Score 88   Ambulation   Primary Mode of Locomotion Prior to Admission Walk   Distance Walked (feet) 25 ft  (x5)   Assist Device Other  (hallway railing)   Gait Pattern Inconsistant Aparna;Slow Aparna;Decreased foot clearance;L foot drag;L hemiparesis;Improper weight shift;Shuffle   Limitations Noted In Balance;Device Management;Endurance;Safety;Sequencing;Strength;Speed;Swing   Provided Assistance  with: Balance;Trunk Support   Walk Assist Level Moderate Assist   Findings amb 25' x 5 along hallway railing with min-mod A x1, also trialed RW with L hand scoop x 10' but pt had significant difficulty managing RW   Does the patient walk? 2. Yes   Toilet Transfer   Type of Assistance Needed Physical assistance   Physical Assistance Level 25% or less   Toilet Transfer CARE Score 3   Therapeutic Interventions   Neuromuscular Re-Education mat exercises: bridges 25 reps, quad sets 30 reps, L SAQ 30 reps, L heel slides 15 reps   Equipment Use   NuStep L2 x 10 min BUE/BLE with L hand attachment   Assessment   Treatment Assessment Pt participated in 60 min skilled PT session focusing on transfers, walking, and motor recovery post CVA. Attempted walking with RW and L hand scoop immediately following Nustep however pt had significant difficulty managing RW and bumping into wall on L side. She did better with R hallway railing, however continues to demonstrate decreased L foot clearance and shuffling gait. After 5 trials at hallway railing pt was fatigued. Finished session with mat exercises; she required active assist for L heel slides. Pt back to bed end of session with call bell in reach. She requires continued skilled PT to maximize independence and safety with all functional mobility.   Barriers to Discharge Inaccessible home environment;Decreased caregiver support   PT Barriers   Physical Impairment Decreased strength;Decreased endurance;Impaired balance;Decreased mobility;Decreased coordination;Decreased safety awareness;Impaired tone;Pain   Functional Limitation Car transfers;Ramp negotiation;Stair negotiation;Standing;Transfers;Walking   Plan   Treatment/Interventions Functional transfer training;LE strengthening/ROM;Therapeutic exercise;Endurance training;Patient/family training;Equipment eval/education;Bed mobility;Gait training   Progress Progressing toward goals   PT Therapy Minutes   PT Time In 1225   PT Time  Out 1325   PT Total Time (minutes) 60   PT Mode of treatment - Individual (minutes) 60   PT Mode of treatment - Concurrent (minutes) 0   PT Mode of treatment - Group (minutes) 0   PT Mode of treatment - Co-treat (minutes) 0   PT Mode of Treatment - Total time(minutes) 60 minutes   PT Cumulative Minutes 600   Therapy Time missed   Time missed? No

## 2025-05-26 PROCEDURE — 97530 THERAPEUTIC ACTIVITIES: CPT

## 2025-05-26 PROCEDURE — 97535 SELF CARE MNGMENT TRAINING: CPT

## 2025-05-26 PROCEDURE — 97129 THER IVNTJ 1ST 15 MIN: CPT

## 2025-05-26 PROCEDURE — 97110 THERAPEUTIC EXERCISES: CPT

## 2025-05-26 PROCEDURE — 97116 GAIT TRAINING THERAPY: CPT

## 2025-05-26 PROCEDURE — 99232 SBSQ HOSP IP/OBS MODERATE 35: CPT | Performed by: INTERNAL MEDICINE

## 2025-05-26 PROCEDURE — 97130 THER IVNTJ EA ADDL 15 MIN: CPT

## 2025-05-26 RX ADMIN — LEVETIRACETAM 1000 MG: 500 TABLET, FILM COATED ORAL at 08:12

## 2025-05-26 RX ADMIN — HYDROMORPHONE HYDROCHLORIDE 2 MG: 2 TABLET ORAL at 04:56

## 2025-05-26 RX ADMIN — PANTOPRAZOLE SODIUM 40 MG: 40 TABLET, DELAYED RELEASE ORAL at 06:24

## 2025-05-26 RX ADMIN — LEVETIRACETAM 1000 MG: 500 TABLET, FILM COATED ORAL at 21:47

## 2025-05-26 RX ADMIN — DICLOFENAC SODIUM 2 G: 10 GEL TOPICAL at 08:15

## 2025-05-26 RX ADMIN — ACETAMINOPHEN 975 MG: 325 TABLET, FILM COATED ORAL at 17:02

## 2025-05-26 RX ADMIN — AMLODIPINE BESYLATE 10 MG: 10 TABLET ORAL at 08:12

## 2025-05-26 RX ADMIN — SULFAMETHOXAZOLE AND TRIMETHOPRIM 1 TABLET: 800; 160 TABLET ORAL at 08:12

## 2025-05-26 RX ADMIN — LISINOPRIL 5 MG: 5 TABLET ORAL at 08:12

## 2025-05-26 RX ADMIN — QUETIAPINE FUMARATE 37.5 MG: 25 TABLET ORAL at 21:48

## 2025-05-26 RX ADMIN — GABAPENTIN 400 MG: 400 CAPSULE ORAL at 21:47

## 2025-05-26 RX ADMIN — MIRTAZAPINE 7.5 MG: 7.5 TABLET, FILM COATED ORAL at 21:47

## 2025-05-26 RX ADMIN — LEVOTHYROXINE SODIUM 50 MCG: 0.05 TABLET ORAL at 05:02

## 2025-05-26 RX ADMIN — ATORVASTATIN CALCIUM 40 MG: 40 TABLET, FILM COATED ORAL at 17:02

## 2025-05-26 RX ADMIN — ACETAMINOPHEN 650 MG: 325 TABLET, FILM COATED ORAL at 11:42

## 2025-05-26 RX ADMIN — DEXAMETHASONE 2 MG: 4 TABLET ORAL at 14:08

## 2025-05-26 RX ADMIN — GABAPENTIN 200 MG: 100 CAPSULE ORAL at 08:12

## 2025-05-26 RX ADMIN — LIDOCAINE 5% 1 PATCH: 700 PATCH TOPICAL at 08:15

## 2025-05-26 RX ADMIN — ENOXAPARIN SODIUM 40 MG: 40 INJECTION SUBCUTANEOUS at 14:08

## 2025-05-26 RX ADMIN — ACETAMINOPHEN 975 MG: 325 TABLET, FILM COATED ORAL at 05:02

## 2025-05-26 RX ADMIN — HYDROMORPHONE HYDROCHLORIDE 2 MG: 2 TABLET ORAL at 11:43

## 2025-05-26 RX ADMIN — DEXAMETHASONE 2 MG: 4 TABLET ORAL at 06:25

## 2025-05-26 RX ADMIN — ACETAMINOPHEN 650 MG: 325 TABLET, FILM COATED ORAL at 23:04

## 2025-05-26 RX ADMIN — ASPIRIN 81 MG CHEWABLE TABLET 81 MG: 81 TABLET CHEWABLE at 08:11

## 2025-05-26 RX ADMIN — METHOCARBAMOL TABLETS 250 MG: 500 TABLET, COATED ORAL at 21:49

## 2025-05-26 RX ADMIN — CYANOCOBALAMIN TAB 1000 MCG 1000 MCG: 1000 TAB at 08:12

## 2025-05-26 NOTE — PROGRESS NOTES
05/26/25 1230   Pain Assessment   Pain Assessment Tool 0-10   Pain Score 4   Pain Location/Orientation Orientation: Lower;Location: Back   Pain Onset/Description Onset: Ongoing   Effect of Pain on Daily Activities mobility   Patient's Stated Pain Goal No pain   Hospital Pain Intervention(s) Medication (See MAR);Heat applied   Multiple Pain Sites Yes   Pain 2   Pain Score 2 4   Pain Location/Orientation 2 Orientation: Left;Location: Shoulder   Restrictions/Precautions   Precautions Bed/chair alarms;Cognitive;Fall Risk;Supervision on toilet/commode;Visual deficit   Weight Bearing Restrictions No   ROM Restrictions No   Cognition   Overall Cognitive Status Impaired   Arousal/Participation Alert;Cooperative   Attention Attends with cues to redirect   Orientation Level Oriented X4   Memory Decreased short term memory   Following Commands Follows one step commands with increased time or repetition   Comments L UE inattention   Subjective   Subjective Patient c/o fatigue after OT and speech session this morning. Patient requesting to have her PT session in the morning.   Roll Left and Right   Type of Assistance Needed Physical assistance   Physical Assistance Level 26%-50%   Comment mod A to R and suoervision to L   Roll Left and Right CARE Score 3   Sit to Lying   Type of Assistance Needed Physical assistance;Verbal cues   Physical Assistance Level 25% or less   Comment min A with L LE   Sit to Lying CARE Score 3   Lying to Sitting on Side of Bed   Type of Assistance Needed Physical assistance;Verbal cues   Physical Assistance Level 25% or less   Comment assist with trunk   Lying to Sitting on Side of Bed CARE Score 3   Sit to Stand   Type of Assistance Needed Physical assistance   Physical Assistance Level 26%-50%   Comment mod A   Sit to Stand CARE Score 3   Bed-Chair Transfer   Type of Assistance Needed Physical assistance   Physical Assistance Level 26%-50%   Comment mod A, stand pivot   Chair/Bed-to-Chair Transfer  CARE Score 3   Walk 10 Feet   Type of Assistance Needed Physical assistance   Physical Assistance Level 51%-75%   Comment 20ft, mod/max assist with RW with hand splint and mod /min assist with HW   Walk 10 Feet CARE Score 2   Walk 50 Feet with Two Turns   Comment fatigue after 20ft   Reason if not Attempted Safety concerns   Walk 50 Feet with Two Turns CARE Score 88   Walk 150 Feet   Reason if not Attempted Safety concerns   Walk 150 Feet CARE Score 88   Ambulation   Primary Mode of Locomotion Prior to Admission Walk   Distance Walked (feet) 20 ft   Assist Device Roller Walker;Bravo Walker   Gait Pattern Inconsistant Aparna;L foot drag;L hemiparesis;Decreased L stance;Improper weight shift   Limitations Noted In Balance;Coordination;Device Management;Endurance;Sequencing;Speed;Strength;Swing   Provided Assistance with: Balance;Trunk Support   Walk Assist Level Maximum Assist   Findings Amb with HW x 20ft, min/mod A and vc for sequence. Amb with RW x 20ft, mod/max assist, assist with RW mgt. Present with dec L stance and knee ext.   Does the patient walk? 2. Yes   Wheel 50 Feet with Two Turns   Reason if not Attempted Refused to perform   Wheel 50 Feet with Two Turns CARE Score 7   Curb or Single Stair   Style negotiated Single stair   Type of Assistance Needed Physical assistance   Physical Assistance Level 26%-50%   Comment  steps, BHR, mod A, descend backward   1 Step (Curb) CARE Score 3   4 Steps   Type of Assistance Needed Physical assistance   Physical Assistance Level 26%-50%   4 Steps CARE Score 3   Therapeutic Interventions   Strengthening Supine: bridging, QS, L SAQ AROM, L SLR AAROM   Flexibility gastroc stretching   Equipment Use   NuStep L2 x 10 min BUE/BLE with L hand attachment   Assessment   Treatment Assessment Patient c/o fatigue, requesting to have morning session with PT and afternoon session for Speech and OT. This session she needed more assistance for transfer. Patient performed STS and  bed<>w/c transfer with mod assist and vc for hand placement. Patient trial with RW with L hand splint and HW. Patient amb with HW x 20ft, min/mod assist and cueing for sequence. She doesn't like the HW due to difficulty with sequence. Then she amb with RW x 20ft, mod/max assist. Patient is impulsive, need assist in managing RW due to L UE weakness. Also noted dec L knee ext on stance. Ace wrap DF assist applied during gait training to address L foot drop. She declined further gait training. Will cont to assess for most appropriate AD for functional mobility, however at this time it appears patient have potential with HW. Cont PT as per POC to maximized functional mobility recovery and dec burden of care.   Problem List Decreased strength;Decreased endurance;Impaired balance;Decreased mobility;Decreased coordination;Decreased cognition;Impaired judgement;Decreased safety awareness;Decreased skin integrity   Barriers to Discharge Inaccessible home environment;Decreased caregiver support   PT Barriers   Physical Impairment Decreased strength;Decreased endurance;Impaired balance;Decreased mobility;Decreased coordination;Decreased safety awareness;Impaired tone;Pain   Functional Limitation Car transfers;Ramp negotiation;Stair negotiation;Standing;Transfers;Walking   Plan   Treatment/Interventions Functional transfer training;Elevations;Therapeutic exercise;Endurance training;Bed mobility;Gait training;Patient/family training   PT Therapy Minutes   PT Time In 1230   PT Time Out 1330   PT Total Time (minutes) 60   PT Mode of treatment - Individual (minutes) 60   PT Mode of treatment - Concurrent (minutes) 0   PT Mode of treatment - Group (minutes) 0   PT Mode of treatment - Co-treat (minutes) 0   PT Mode of Treatment - Total time(minutes) 60 minutes   PT Cumulative Minutes 660   Therapy Time missed   Time missed? No

## 2025-05-26 NOTE — NURSING NOTE
Pt complained of left arm getting caught in between the metal rails in the bathroom and sometimes on other things which causes her to bruise. B/L elbow pads placed for protection.

## 2025-05-26 NOTE — PROGRESS NOTES
05/26/25 1100   Pain Assessment   Pain Assessment Tool 0-10   Pain Score 3   Pain Location/Orientation Orientation: Lower;Location: Back   Comprehension   Comprehension (FIM) 4 - Needs words repeated   Expression   Expression (FIM) 5 - Needs help/cues only RARELY (< 10% of the time)   Social Interaction   Social Interaction (FIM) 5 - Needs monitoring/encouragement  to participate/interact   Problem Solving   Problem solving (FIM) 3 - Solves basic problmes 50-74% of time   Memory   Memory (FIM) 4 - Recognizes/recalls/performs 75-89%   Speech/Language/Cognition Assessmetn   Treatment Assessment Pt lying in bed with both daughters present in room upon SLP entering. Daughters departing for the day but pleasant and introducing selves. Pt agreeable to engaging in ST tasks but requested room lights stay off. Pt and SLP engaged in brief rapport and pt demonstrating good LT recall and self awareness. Pt then given option to complete logical solutions task herself or to complete with SLP reading aloud prompts to assist with completion. Pt requested assistance due to her inability to read given her cataracts and poor eyeglass prescription. Pt was to identify which dog belong to the four owners listed. She was given four hints which SLP read aloud. Pt required mod-max assist to complete process of elimination, often showing limited initiation to tell SLP to cross off ones that did not belong to the owner being discussed, etc. SLP provided rewording of each hint and pt required several repetitions and sometimes unable to identify the owner unless SLP stated the answer. This task was finished with SLP explaining the answers and how SLP identified each dog's owner. Pt expressing understanding but suspect total comprehension was not achieved for task. Pt then given sets of four words and asked to identify which does not belong with the rest, then explain why it didn't belong with the rest. These were abstract differences rather  "than more simple, concrete ones. Pt able to identify the odd one out on 10/12, increasing to 12/12 when given second attempt and presentation of items slower. She was able to identify what made the odd one out different on 9/12. Pt was requesting to be done at this time, stating it was \"too hard,\" and \"other days it's not this difficult.\" SLP informed pt session was over any ways and reassured that some days are harder than others but to continue to give her best effort. Pt expressing understanding and agreement to continue trying. Pt is showing benefit to skilled SLP services while on the [facility] and would continue to benefit from additional services to maintain level of function and reduce burden of care for family at time of discharge.   SLP Therapy Minutes   SLP Time In 1100   SLP Time Out 1130   SLP Total Time (minutes) 30   SLP Mode of treatment - Individual (minutes) 30   SLP Mode of treatment - Concurrent (minutes) 0   SLP Mode of treatment - Group (minutes) 0   SLP Mode of treatment - Co-treat (minutes) 0   SLP Mode of Treatment - Total time(minutes) 30 minutes   SLP Cumulative Minutes 255   Therapy Time missed   Time missed? No       "

## 2025-05-26 NOTE — PLAN OF CARE
Problem: PAIN - ADULT  Goal: Verbalizes/displays adequate comfort level or baseline comfort level  Description: Interventions:  - Encourage patient to monitor pain and request assistance  - Assess pain using appropriate pain scale  - Administer analgesics as ordered based on type and severity of pain and evaluate response  - Implement non-pharmacological measures as appropriate and evaluate response  - Consider cultural and social influences on pain and pain management  - Notify physician/advanced practitioner if interventions unsuccessful or patient reports new pain  - Educate patient/family on pain management process including their role and importance of  reporting pain   - Provide non-pharmacologic/complimentary pain relief interventions  Outcome: Progressing     Problem: SKIN/TISSUE INTEGRITY - ADULT  Goal: Skin Integrity remains intact(Skin Breakdown Prevention)  Description: Assess:  -Perform Benjamín assessment every shift  -Clean and moisturize skin every shift  -Inspect skin when repositioning, toileting, and assisting with ADLS  -Assess extremities for adequate circulation and sensation     Bed Management:  -Have minimal linens on bed & keep smooth, unwrinkled  -Change linens as needed when moist or perspiring  -Avoid sitting or lying in one position for more than 2 hours while in bed  -Keep HOB at 30degrees     Toileting:  -Offer bedside commode  -Assess for incontinence every shift    Activity:  -Mobilize patient 3 times a day  -Encourage activity and walks on unit  -Encourage or provide ROM exercises   -Turn and reposition patient every 2 Hours  -Use appropriate equipment to lift or move patient in bed  -Instruct/ Assist with weight shifting every 2 hours when out of bed in chair  -Consider limitation of chair time 2 hour intervals    Skin Care:  -Avoid use of baby powder, tape, friction and shearing, hot water or constrictive clothing  -Do not massage red bony areas    Outcome: Progressing

## 2025-05-26 NOTE — PROGRESS NOTES
05/26/25 0830   Pain Assessment   Pain Score No Pain   Restrictions/Precautions   Precautions Bed/chair alarms;Cognitive;Fall Risk;Supervision on toilet/commode;Visual deficit   Weight Bearing Restrictions No   ROM Restrictions No   Oral Hygiene   Type of Assistance Needed Set-up / clean-up   Comment setup seated at sink   Oral Hygiene CARE Score 5   Shower/Bathe Self   Type of Assistance Needed Physical assistance   Physical Assistance Level 25% or less   Comment completed shower this session seated on shower chair and able to wash 9/10 parts. required asssitance to wash RUE. xleg method to wash feet and CGA in stance when washing goldie/buttocks   Shower/Bathe Self CARE Score 3   Bathing   Assessed Bath Style Shower   Anticipated D/C Bath Style Shower   Able to Gather/Transport No   Able to Adjust Water Temperature No   Able to Wash/Rinse/Dry (body part) Left Arm;L Upper Leg;R Upper Leg;L Lower Leg/Foot;R Lower Leg/Foot;Chest;Abdomen;Perineal Area;Buttocks   Limitations Noted in Balance;Coordination;Safety;Strength;Timeliness   Positioning Seated;Standing   Tub/Shower Transfer   Findings Myla/CGA SPT with use of GB shower chair><wc   Upper Body Dressing   Type of Assistance Needed Supervision;Verbal cues   Comment req vc to carryover rae dressing technique and req inc time   Upper Body Dressing CARE Score 4   Lower Body Dressing   Type of Assistance Needed Physical assistance   Physical Assistance Level 26%-50%   Comment encouraged pt to complete xleg method to thread undergarement/pants. in stance with modA for steadying, pt able to manage R side CM and req A for L side CM   Lower Body Dressing CARE Score 3   Putting On/Taking Off Footwear   Type of Assistance Needed Physical assistance   Physical Assistance Level 25% or less   Comment therapist applied sock on toes of left foot and then pt pulled up the rest of the way. xleg thod and able to don R sock/sneakers   Putting On/Taking Off Footwear CARE Score 3    Lying to Sitting on Side of Bed   Type of Assistance Needed Physical assistance   Physical Assistance Level 25% or less   Comment trunk mngmnt   Lying to Sitting on Side of Bed CARE Score 3   Sit to Stand   Type of Assistance Needed Physical assistance   Physical Assistance Level 26%-50%   Comment modA no AD for CM while completing LB dressing   Sit to Stand CARE Score 3   Bed-Chair Transfer   Type of Assistance Needed Physical assistance   Physical Assistance Level 25% or less   Comment Myla/CGA sit pivot to R side   Chair/Bed-to-Chair Transfer CARE Score 3   ROM - Left Upper Extremities    LUE ROM Comment seated at table engaged pt in LUE towel slides. Completed shoulder flex/ext, V/s and circles. Req modA to stabilize LUE and achieve full ROM. Completed 3x10 with rest break in between   Cognition   Overall Cognitive Status Impaired   Arousal/Participation Alert;Cooperative   Attention Attends with cues to redirect   Orientation Level Oriented X4   Memory Decreased short term memory   Following Commands Follows one step commands with increased time or repetition   Activity Tolerance   Activity Tolerance Patient tolerated treatment well   Assessment   Treatment Assessment Engaged pt in 90mins of skilled OT services with focus on ADL and LUE AAROM. Pt toelrated session well and agreeable to shower. Pt demonstrated improvement in ADL. Observed to increase LB scores when completing xleg method. At times pt becomes discouraged when unable to compelte task right away and requires encouragement. Pts daughter present midway through session and cont to be agreeable to STR and awaiting CM to discuss accepting facilities. Pt is making steady gains. Cont OT POC with focus on LUE NMR/NPP/WB, transfer training, activity tolerance, RUE TE to cont to inc fx performance and indep.   Prognosis Fair   Problem List Decreased strength;Decreased endurance;Impaired balance;Decreased mobility;Decreased coordination;Decreased  cognition;Impaired judgement;Decreased safety awareness;Decreased skin integrity   Barriers to Discharge Inaccessible home environment;Decreased caregiver support   Plan   Treatment/Interventions ADL retraining;Functional transfer training;Therapeutic exercise;Endurance training;Patient/family training;Bed mobility;Compensatory technique education   Progress Progressing toward goals   Discharge Recommendation   Rehab Resource Intensity Level, OT   (STR)   OT Therapy Minutes   OT Time In 0830   OT Time Out 1000   OT Total Time (minutes) 90   OT Mode of treatment - Individual (minutes) 90   OT Mode of treatment - Concurrent (minutes) 0   OT Mode of treatment - Group (minutes) 0   OT Mode of treatment - Co-treat (minutes) 0   OT Mode of Treatment - Total time(minutes) 90 minutes   OT Cumulative Minutes 700   Therapy Time missed   Time missed? No

## 2025-05-26 NOTE — PROGRESS NOTES
Physical Medicine and Rehabilitation Progress Note  Ayla Nye 74 y.o. female MRN: [id number]  Unit/Bed#: [facility] 262-01 Encounter: [id number]    Chief Complaint:  pain    Interval History: No acute events overnight.  Endorsing pain to her back Patient reports that she feels weaker today compared to yesterday. Concern that she might have had another PE. She denied any chest tightness, SOB, CP, f/c/n/v, abdominal pain, dysuria. Discussed that she recently had her steroids lowered which may contribute to inc fatigue. Pt reports similar symptoms during prior steroid adjustments. Vitals obtained were stable.     Assessment/Plan - Continue plan of care as per primary attending, unless otherwise stated below:      Rehab - Continue PT, OT, and SLP  Bowel - LBM 5/24 inc senna to 2 tabs daily and miralax PRN   Bladder - Patient voiding spontaneously  Pain - Per PDMP patient receiving hydromorphone 2mg 90tabs 15 days last filled 3/24 from palliative care. Restart as hydromorphone 2 mg Q4h PRN. Pt notes she mainly takes 4mg at night. Cw scheduled tylenol; monitor for excess sedation; narcan PRN for opiod toxicity ordered   Hx of PE: most recent CTA 3/2025 was negative, pt denying any symptoms concerning for PE at this time. Vitals are stable. Continues on lovenox for DVT ppx. C/t monitor;   Fatigue: recently dec steroid from 3mg BID to 2mg BID 5/23 due to inability to sleep and restlessness on higher dose. Pt denying any infectious symptoms at this time. Vitals stable. Also newly restarted dilaudid. Will continue to monitor. Fu routine blood work tomorrow.   Sleep: cw mirtazepine 7.5 HS; did not improved sleep overnight. Deferred melatonin due to odd dreams    Nori Goodwin MD  Physical Medicine and Rehabilitation      Current Medications[1]    Physical Exam:  Temp:  [97.3 °F (36.3 °C)-98.7 °F (37.1 °C)] 97.3 °F (36.3 °C)  HR:  [73-92] 92  Resp:  [18] 18  BP: (110-128)/(64-75) 121/71  SpO2:  [92 %-93 %] 92 %    Physical  Exam    Gen: No acute distress,   HEENT: Moist mucus membranes,   Cardiovascular: No edema  Pulmonary: Non-labored breathing.on RA, breathing regular rate  : No ruelas  GI:  non-distended.  MSK: moving UE spontaneously   Integumentary: Skin is warm, dry.  Neuro: Speech is intact. Appropriate to questioning.  Psych: Normal mood and affect.       Laboratory:  Labs reviewed  Results from last 7 days   Lab Units 05/22/25  0538   HEMOGLOBIN g/dL 9.3*   HEMATOCRIT % 31.5*   WBC Thousand/uL 14.62*     Results from last 7 days   Lab Units 05/22/25  0538   BUN mg/dL 29*   SODIUM mmol/L 139   POTASSIUM mmol/L 4.3   CHLORIDE mmol/L 103   CREATININE mg/dL 0.80            Diagnostic Studies: Reviewed, no new imaging   No orders to display       I have spent a total time of 35 minutes in caring for this patient on the day of the visit/encounter including Counseling / Coordination of care, Documenting in the medical record, and Communicating with other healthcare professionals .         [1]   Current Facility-Administered Medications:     acetaminophen (TYLENOL) tablet 975 mg, 975 mg, Oral, Daily, 975 mg at 05/26/25 0502 **AND** acetaminophen (TYLENOL) tablet 650 mg, 650 mg, Oral, Daily, 650 mg at 05/26/25 1142 **AND** acetaminophen (TYLENOL) tablet 975 mg, 975 mg, Oral, Daily, 975 mg at 05/25/25 1719 **AND** acetaminophen (TYLENOL) tablet 650 mg, 650 mg, Oral, Daily, Cristian Dias MD, 650 mg at 05/25/25 0433    aluminum-magnesium hydroxide-simethicone (MAALOX) oral suspension 30 mL, 30 mL, Oral, Q4H PRN, Ata Cunningham MD, 30 mL at 05/25/25 2216    amLODIPine (NORVASC) tablet 10 mg, 10 mg, Oral, Daily, ZULEYMA Viramontes, 10 mg at 05/26/25 0812    aspirin chewable tablet 81 mg, 81 mg, Oral, Daily, Claudine Leos PA-C, 81 mg at 05/26/25 0811    atorvastatin (LIPITOR) tablet 40 mg, 40 mg, Oral, Daily With Dinner, Claudine Leos PA-C, 40 mg at 05/25/25 1719    cyanocobalamin (VITAMIN B-12) tablet 1,000 mcg, 1,000  mcg, Oral, Daily, Claudine Leos PA-C, 1,000 mcg at 05/26/25 0812    dexamethasone (DECADRON) tablet 2 mg, 2 mg, Oral, BID, Claudine Leos PA-C, 2 mg at 05/26/25 1408    Diclofenac Sodium (VOLTAREN) 1 % topical gel 2 g, 2 g, Topical, 4x Daily, Claudine Leos PA-C, 2 g at 05/26/25 0815    enoxaparin (LOVENOX) subcutaneous injection 40 mg, 40 mg, Subcutaneous, Q24H, Claudine Leos PA-C, 40 mg at 05/26/25 1408    gabapentin (NEURONTIN) capsule 200 mg, 200 mg, Oral, Daily, 200 mg at 05/26/25 0812 **AND** gabapentin (NEURONTIN) capsule 400 mg, 400 mg, Oral, HS, Claudine Leos PA-C, 400 mg at 05/25/25 2218    HYDROmorphone (DILAUDID) tablet 2 mg, 2 mg, Oral, Q4H PRN, Nori Goodwin MD, 2 mg at 05/26/25 1143    levETIRAcetam (KEPPRA) tablet 1,000 mg, 1,000 mg, Oral, BID, Claudine Leos PA-C, 1,000 mg at 05/26/25 0812    levothyroxine tablet 50 mcg, 50 mcg, Oral, Early Morning, Claudine Leos PA-C, 50 mcg at 05/26/25 0502    lidocaine (LIDODERM) 5 % patch 1 patch, 1 patch, Topical, Daily, Claudine Leos PA-C, 1 patch at 05/26/25 0815    lisinopril (ZESTRIL) tablet 5 mg, 5 mg, Oral, Daily, ZULEYMA Viramontes, 5 mg at 05/26/25 0812    methocarbamol (ROBAXIN) tablet 250 mg, 250 mg, Oral, Q6H PRN, Claudine Leos PA-C, 250 mg at 05/24/25 2126    mirtazapine (REMERON) tablet 7.5 mg, 7.5 mg, Oral, HS, Cristian Dias MD, 7.5 mg at 05/25/25 2013    naloxone (NARCAN) intranasal 2 mg, 2 mg, Nasal, Daily PRN, Nori Goodwin MD    pantoprazole (PROTONIX) EC tablet 40 mg, 40 mg, Oral, Daily Before Breakfast, Claudine Leos PA-C, 40 mg at 05/26/25 0624    polyethylene glycol (MIRALAX) packet 17 g, 17 g, Oral, Daily PRN, Nori Goodwin MD    QUEtiapine (SEROquel) tablet 37.5 mg, 37.5 mg, Oral, HS, Claudine Leos PA-C, 37.5 mg at 05/25/25 2218    senna (SENOKOT) tablet 17.2 mg, 2 tablet, Oral, Daily, Nori Goodwin MD    sulfamethoxazole-trimethoprim (BACTRIM DS) 800-160 mg per tablet 1 tablet, 1 tablet, Oral, Once per day  on Monday Wednesday Friday, Claudine Leos PA-C, 1 tablet at 05/26/25 0812     ADMIT

## 2025-05-26 NOTE — PLAN OF CARE
Problem: PAIN - ADULT  Goal: Verbalizes/displays adequate comfort level or baseline comfort level  Description: Interventions:  - Encourage patient to monitor pain and request assistance  - Assess pain using appropriate pain scale  - Administer analgesics as ordered based on type and severity of pain and evaluate response  - Implement non-pharmacological measures as appropriate and evaluate response  - Consider cultural and social influences on pain and pain management  - Notify physician/advanced practitioner if interventions unsuccessful or patient reports new pain  - Educate patient/family on pain management process including their role and importance of  reporting pain   - Provide non-pharmacologic/complimentary pain relief interventions  Outcome: Progressing     Problem: METABOLIC, FLUID AND ELECTROLYTES - ADULT  Goal: Fluid balance maintained  Description: INTERVENTIONS:  - Monitor labs   - Monitor I/O and WT  - Instruct patient on fluid and nutrition as appropriate  - Assess for signs & symptoms of volume excess or deficit  Outcome: Progressing     Problem: SKIN/TISSUE INTEGRITY - ADULT  Goal: Skin Integrity remains intact(Skin Breakdown Prevention)  Description: Assess:  -Perform Benjamín assessment every shift  -Clean and moisturize skin every shift  -Inspect skin when repositioning, toileting, and assisting with ADLS  -Assess extremities for adequate circulation and sensation     Bed Management:  -Have minimal linens on bed & keep smooth, unwrinkled  -Change linens as needed when moist or perspiring  -Avoid sitting or lying in one position for more than 2 hours while in bed  -Keep HOB at 30degrees     Toileting:  -Offer bedside commode  -Assess for incontinence every shift    Activity:  -Mobilize patient 3 times a day  -Encourage activity and walks on unit  -Encourage or provide ROM exercises   -Turn and reposition patient every 2 Hours  -Use appropriate equipment to lift or move patient in bed  -Instruct/  Assist with weight shifting every 2 hours when out of bed in chair  -Consider limitation of chair time 2 hour intervals    Skin Care:  -Avoid use of baby powder, tape, friction and shearing, hot water or constrictive clothing  -Do not massage red bony areas    Outcome: Progressing     Problem: MUSCULOSKELETAL - ADULT  Goal: Maintain or return mobility to safest level of function  Description: INTERVENTIONS:  - Assess patient's ability to carry out ADLs; assess patient's baseline for ADL function and identify physical deficits which impact ability to perform ADLs (bathing, care of mouth/teeth, toileting, grooming, dressing, etc.)  - Assess/evaluate cause of self-care deficits   - Assess range of motion  - Assess patient's mobility  - Assess patient's need for assistive devices and provide as appropriate  - Encourage maximum independence but intervene and supervise when necessary  - Involve family in performance of ADLs  - Assess for home care needs following discharge   - Provide patient education as appropriate  Outcome: Progressing  Goal: Maintain proper alignment of affected body part  Description: INTERVENTIONS:  - Support, maintain and protect limb and body alignment  - Provide patient/ family with appropriate education  Outcome: Progressing

## 2025-05-26 NOTE — NURSING NOTE
Patient reported decreased strength and that she feels weaker today compared to yesterday.  VSS.  Patient denied any chest pain, SOB or any increase in pain.  Patient did have her Dilaudid today which may have contributed to her fatigue.  MD made aware and discussed with patient that her steroid dose was just lowered which may also be contributing to her fatigue.  Will continue to monitor.

## 2025-05-27 ENCOUNTER — HOSPITAL ENCOUNTER (OUTPATIENT)
Dept: INFUSION CENTER | Facility: HOSPITAL | Age: 75
Discharge: HOME | End: 2025-05-27
Attending: INTERNAL MEDICINE

## 2025-05-27 ENCOUNTER — APPOINTMENT (INPATIENT)
Dept: CT IMAGING | Facility: HOSPITAL | Age: 75
DRG: 949 | End: 2025-05-27
Payer: MEDICARE

## 2025-05-27 LAB
ANION GAP SERPL CALCULATED.3IONS-SCNC: 9 MMOL/L (ref 4–13)
ANISOCYTOSIS BLD QL SMEAR: PRESENT
BASOPHILS # BLD MANUAL: 0 THOUSAND/UL (ref 0–0.1)
BASOPHILS NFR MAR MANUAL: 0 % (ref 0–1)
BUN SERPL-MCNC: 29 MG/DL (ref 5–25)
CALCIUM SERPL-MCNC: 8.7 MG/DL (ref 8.4–10.2)
CHLORIDE SERPL-SCNC: 103 MMOL/L (ref 96–108)
CO2 SERPL-SCNC: 26 MMOL/L (ref 21–32)
CREAT SERPL-MCNC: 0.89 MG/DL (ref 0.6–1.3)
EOSINOPHIL # BLD MANUAL: 0 THOUSAND/UL (ref 0–0.4)
EOSINOPHIL NFR BLD MANUAL: 0 % (ref 0–6)
ERYTHROCYTE [DISTWIDTH] IN BLOOD BY AUTOMATED COUNT: 16.9 % (ref 11.6–15.1)
GFR SERPL CREATININE-BSD FRML MDRD: 64 ML/MIN/1.73SQ M
GLUCOSE P FAST SERPL-MCNC: 70 MG/DL (ref 65–99)
GLUCOSE SERPL-MCNC: 70 MG/DL (ref 65–140)
HCT VFR BLD AUTO: 33.5 % (ref 34.8–46.1)
HGB BLD-MCNC: 9.7 G/DL (ref 11.5–15.4)
LYMPHOCYTES # BLD AUTO: 0.61 THOUSAND/UL (ref 0.6–4.47)
LYMPHOCYTES # BLD AUTO: 5 % (ref 14–44)
MCH RBC QN AUTO: 27.2 PG (ref 26.8–34.3)
MCHC RBC AUTO-ENTMCNC: 29 G/DL (ref 31.4–37.4)
MCV RBC AUTO: 94 FL (ref 82–98)
MONOCYTES # BLD AUTO: 0.24 THOUSAND/UL (ref 0–1.22)
MONOCYTES NFR BLD: 2 % (ref 4–12)
MYELOCYTE ABSOLUTE CT: 0.49 THOUSAND/UL (ref 0–0.1)
MYELOCYTES NFR BLD MANUAL: 4 % (ref 0–1)
NEUTROPHILS # BLD MANUAL: 10.88 THOUSAND/UL (ref 1.85–7.62)
NEUTS HYPERSEG BLD QL SMEAR: PRESENT
NEUTS SEG NFR BLD AUTO: 89 % (ref 43–75)
PLATELET # BLD AUTO: 278 THOUSANDS/UL (ref 149–390)
PLATELET BLD QL SMEAR: ADEQUATE
PMV BLD AUTO: 8.9 FL (ref 8.9–12.7)
POTASSIUM SERPL-SCNC: 4 MMOL/L (ref 3.5–5.3)
RBC # BLD AUTO: 3.57 MILLION/UL (ref 3.81–5.12)
RBC MORPH BLD: PRESENT
SODIUM SERPL-SCNC: 138 MMOL/L (ref 135–147)
WBC # BLD AUTO: 12.23 THOUSAND/UL (ref 4.31–10.16)

## 2025-05-27 PROCEDURE — 97530 THERAPEUTIC ACTIVITIES: CPT

## 2025-05-27 PROCEDURE — 97112 NEUROMUSCULAR REEDUCATION: CPT

## 2025-05-27 PROCEDURE — 99232 SBSQ HOSP IP/OBS MODERATE 35: CPT | Performed by: INTERNAL MEDICINE

## 2025-05-27 PROCEDURE — 85027 COMPLETE CBC AUTOMATED: CPT | Performed by: NURSE PRACTITIONER

## 2025-05-27 PROCEDURE — 97116 GAIT TRAINING THERAPY: CPT

## 2025-05-27 PROCEDURE — 70450 CT HEAD/BRAIN W/O DYE: CPT

## 2025-05-27 PROCEDURE — 80048 BASIC METABOLIC PNL TOTAL CA: CPT | Performed by: NURSE PRACTITIONER

## 2025-05-27 PROCEDURE — 99233 SBSQ HOSP IP/OBS HIGH 50: CPT

## 2025-05-27 PROCEDURE — 85007 BL SMEAR W/DIFF WBC COUNT: CPT | Performed by: NURSE PRACTITIONER

## 2025-05-27 PROCEDURE — 97110 THERAPEUTIC EXERCISES: CPT

## 2025-05-27 PROCEDURE — 97535 SELF CARE MNGMENT TRAINING: CPT

## 2025-05-27 RX ORDER — QUETIAPINE FUMARATE 25 MG/1
25 TABLET, FILM COATED ORAL
Status: DISCONTINUED | OUTPATIENT
Start: 2025-05-27 | End: 2025-05-27

## 2025-05-27 RX ORDER — QUETIAPINE FUMARATE 25 MG/1
37.5 TABLET, FILM COATED ORAL
Status: DISCONTINUED | OUTPATIENT
Start: 2025-05-27 | End: 2025-05-27

## 2025-05-27 RX ORDER — QUETIAPINE FUMARATE 25 MG/1
12.5 TABLET, FILM COATED ORAL DAILY PRN
Status: DISCONTINUED | OUTPATIENT
Start: 2025-05-27 | End: 2025-05-30 | Stop reason: HOSPADM

## 2025-05-27 RX ORDER — QUETIAPINE FUMARATE 25 MG/1
25 TABLET, FILM COATED ORAL
Status: DISCONTINUED | OUTPATIENT
Start: 2025-05-27 | End: 2025-05-30 | Stop reason: HOSPADM

## 2025-05-27 RX ADMIN — DEXAMETHASONE 2 MG: 4 TABLET ORAL at 06:32

## 2025-05-27 RX ADMIN — SENNOSIDES 17.2 MG: 8.6 TABLET, FILM COATED ORAL at 08:38

## 2025-05-27 RX ADMIN — HYDROMORPHONE HYDROCHLORIDE 2 MG: 2 TABLET ORAL at 19:33

## 2025-05-27 RX ADMIN — LEVETIRACETAM 1000 MG: 500 TABLET, FILM COATED ORAL at 08:38

## 2025-05-27 RX ADMIN — GABAPENTIN 400 MG: 400 CAPSULE ORAL at 21:32

## 2025-05-27 RX ADMIN — ASPIRIN 81 MG CHEWABLE TABLET 81 MG: 81 TABLET CHEWABLE at 08:38

## 2025-05-27 RX ADMIN — PANTOPRAZOLE SODIUM 40 MG: 40 TABLET, DELAYED RELEASE ORAL at 06:32

## 2025-05-27 RX ADMIN — LEVOTHYROXINE SODIUM 50 MCG: 0.05 TABLET ORAL at 06:32

## 2025-05-27 RX ADMIN — ACETAMINOPHEN 650 MG: 325 TABLET, FILM COATED ORAL at 11:43

## 2025-05-27 RX ADMIN — MIRTAZAPINE 7.5 MG: 7.5 TABLET, FILM COATED ORAL at 19:33

## 2025-05-27 RX ADMIN — AMLODIPINE BESYLATE 10 MG: 10 TABLET ORAL at 08:38

## 2025-05-27 RX ADMIN — HYDROMORPHONE HYDROCHLORIDE 2 MG: 2 TABLET ORAL at 23:59

## 2025-05-27 RX ADMIN — ACETAMINOPHEN 975 MG: 325 TABLET, FILM COATED ORAL at 06:32

## 2025-05-27 RX ADMIN — DEXAMETHASONE 2 MG: 4 TABLET ORAL at 15:59

## 2025-05-27 RX ADMIN — GABAPENTIN 200 MG: 100 CAPSULE ORAL at 08:38

## 2025-05-27 RX ADMIN — LIDOCAINE 5% 1 PATCH: 700 PATCH TOPICAL at 08:38

## 2025-05-27 RX ADMIN — ACETAMINOPHEN 975 MG: 325 TABLET, FILM COATED ORAL at 17:30

## 2025-05-27 RX ADMIN — QUETIAPINE FUMARATE 25 MG: 25 TABLET ORAL at 21:31

## 2025-05-27 RX ADMIN — ATORVASTATIN CALCIUM 40 MG: 40 TABLET, FILM COATED ORAL at 15:59

## 2025-05-27 RX ADMIN — CYANOCOBALAMIN TAB 1000 MCG 1000 MCG: 1000 TAB at 08:38

## 2025-05-27 RX ADMIN — ENOXAPARIN SODIUM 40 MG: 40 INJECTION SUBCUTANEOUS at 15:59

## 2025-05-27 RX ADMIN — LISINOPRIL 5 MG: 5 TABLET ORAL at 08:38

## 2025-05-27 RX ADMIN — LEVETIRACETAM 1000 MG: 500 TABLET, FILM COATED ORAL at 21:31

## 2025-05-27 RX ADMIN — ACETAMINOPHEN 650 MG: 325 TABLET, FILM COATED ORAL at 23:54

## 2025-05-27 NOTE — PROGRESS NOTES
Progress Note - Internal Medicine   Name: Ayla Nye 74 y.o. 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  Hx of radiation in 08/2024 and 01/2025.  Hx of hold on treatments due to multiple admission/toxicities/PCP pneumonia/PE.  Most recently receiving Taoxtere with plans to start chemotherapy after admission.    MRI brain showed stable enhancing lesions within the L superior occipital lobe and L parietal lobe with surrounding vasogenic edema, stable foci of restricted diffusion within the R posterior frontal lobe most likely sequela of subacute ischemia.  CT CAP showed multiple lesions with increase in size.    Continue increased dose of dexamethasone 3mg BID.  Decreased to 2mg BID on 5/23 after discussion between Primary team and Oncology.  Continue Bactrim -160mg MWF.  Continue Keppra 1000mg BID.    Follows with Dr. Castro (Oncology), Dr Vuong (Radiation/Oncology), and Palliative as outpatient.  Follow-up with Neurology in 4-6 weeks as outpatient.    Noted to have worsening L sided weakness through the weekend and today - obtain CTH.  HTN (hypertension)  Home regimen: amlodipine 5mg daily.  Continue amlodipine 10mg daily and lisinopril 5mg daily.  Monitor BP with routine VS.  Insomnia  Continue Remeron 7.5mg at HS and Seroquel 37.5mg at HS.  Hypothyroidism  Continue home levothyroxine 50mcg daily.  TSH 0.408 and T4 0.79 on 5/12.  Recommend repeat TSH in 6 weeks as outpatient.  Anemia  Hgb currently stable at 9.7  Baseline 9-10.  Continue home cyanocobalamin 1000mcg daily.  Asymptomatic.  Continue to trend routine CBC.  Leukocytosis  WBC count currently stable at 12.23.  No active s/s of infection.  Likely steroid induced.  Continue to trend routine CBC.  History of pulmonary embolism  Most recent CTA chest/PE study  in 03/2025 showed no PE.  Had been on Xarelto in the past but discontinued due to new foci of hemorrhage within the L occipital metastasis.  Monitor for s/s of reoccurrence.  GERD (gastroesophageal reflux disease)  Continue home Protonix 40mg daily.  Stroke-like symptoms  Presented on 5/12/25 after experiencing mechanical fall with head strike.  Hx of L sided weakness s/p stroke in 04/2025.  CTH showed vasogenic edema L parietal vertex compatible with known brain metastasis, no hemorrhage identified.  MRI brain showed stable enhancing lesions within the L superior occipital lobe and L parietal lobe with surrounding vasogenic edema, stable foci of restricted diffusion within the R posterior frontal lobe most likely sequela of subacute ischemia.    Per Neurology - symptoms likely due to some increase in size of CNS metastasis on top of noted vasogenic edema.  Home dexamethasone increased from 2mg BID to 3mg BID.  Dose decreased back to 2mg BID on 5/23.    Neurovascular checks Q shift.  Follow-up with Neurology in 4-6 weeks as outpatient.    Primary team following.  PT/OT/SLP.  History of stroke  R frontal subacute infarct in 04/2025.  Continue ASA 81mg daily and Lipitor 40mg daily.  Continue with PT/OT.  Follow-up with Neurovascular in 4-6 weeks as outpatient.    VTE Pharmacologic Prophylaxis:   Pharmacologic: Enoxaparin (Lovenox)  Mechanical VTE Prophylaxis in Place: Yes- sequential compression devices.    Current Length of Stay: 12 day(s)    Current Patient Status: Inpatient Rehab     Discharge Plan: As per treatment team D/C pending SNF acceptance and weakness     Code Status: Level 3 - DNAR and DNI    Subjective:   Patient examined in bed after returning from therapy session. She reports sleeping better last night 2/2 after getting dilaudid. She reports generalized weakness is consistent and non-improving over last several days. Discussed possibly decreasing seroquel and remeron  per PM&R to see if this improves  "weakness but patient became visibly upset and endorsed hesitancy over this choice since she feels like she is still not sleeping well. Reviewed alternate choice of increasing her steroid to see if her weakness improves but she is also not open to that change at this time.  Agreeable to CTH to assess for edema and pt would be agreeable to increasing steroids pending CT results.  Patient admits to mild low back pain which is controlled with a lidocaine patch at this time. Complaints of very slight \"pulling\" sensation when exerting herself in the middle of the chest.  States that her daughter noticed yesterday that she was breathing differently.  Denies any dyspnea and denies any cough or fevers.  Denies any palpitations or CP.  She offers no other complaints or concerns at this time.    Objective:     Vitals:   Temp (24hrs), Av.5 °F (36.4 °C), Min:96.1 °F (35.6 °C), Max:98.6 °F (37 °C)    Temp:  [96.1 °F (35.6 °C)-98.6 °F (37 °C)] 96.1 °F (35.6 °C)  HR:  [76-88] 88  Resp:  [20] 20  BP: (125-139)/(63-83) 131/63  SpO2:  [92 %-95 %] 95 %  Body mass index is 21.45 kg/m².     Review of Systems   Constitutional:  Positive for fatigue.   HENT: Negative.     Eyes: Negative.    Respiratory:          Complaints of slight \"pulling\" sensation in the middle of the chest while exerting herself, noticed by family yesterday   Cardiovascular: Negative.    Gastrointestinal: Negative.    Endocrine: Negative.    Genitourinary: Negative.    Musculoskeletal:  Positive for back pain (Mild lumbar pain, chronic, lidocaine patch currently in place).   Skin: Negative.    Allergic/Immunologic: Negative.    Neurological:  Positive for weakness (for past 3 days).   Hematological: Negative.    Psychiatric/Behavioral: Negative.     All other systems reviewed and are negative.       Input and Output Summary (last 24 hours):       Intake/Output Summary (Last 24 hours) at 2025 1043  Last data filed at 2025 0900  Gross per 24 hour   Intake " 540 ml   Output 550 ml   Net -10 ml       Physical Exam:     Physical Exam  Vitals reviewed.   Constitutional:       General: She is awake. She is not in acute distress.     Appearance: Normal appearance. She is not ill-appearing.   HENT:      Head: Normocephalic and atraumatic.      Nose: Nose normal.     Eyes:      Pupils: Pupils are equal, round, and reactive to light.       Cardiovascular:      Rate and Rhythm: Normal rate and regular rhythm.      Heart sounds: Murmur heard.      Systolic murmur is present with a grade of 2/6.   Pulmonary:      Effort: Pulmonary effort is normal. No accessory muscle usage or respiratory distress.      Breath sounds: Examination of the right-lower field reveals decreased breath sounds. Examination of the left-lower field reveals decreased breath sounds. Decreased breath sounds present. No wheezing or rhonchi.   Abdominal:      General: Abdomen is flat. Bowel sounds are normal. There is no distension.      Palpations: Abdomen is soft.      Tenderness: There is no abdominal tenderness.      Comments: LBM 5/26     Musculoskeletal:      Right shoulder: Normal.      Left shoulder: Decreased range of motion. Decreased strength.      Right upper arm: Normal.      Right elbow: Normal.      Right forearm: Normal.      Right wrist: Normal.      Right hand: Normal.      Left hand: Decreased range of motion. Decreased strength (4/5).      Cervical back: Normal range of motion.      Right lower leg: No edema.      Left lower leg: No edema.     Skin:     General: Skin is warm and dry.      Capillary Refill: Capillary refill takes 2 to 3 seconds.      Nails: There is clubbing.     Neurological:      General: No focal deficit present.      Mental Status: She is alert and oriented to person, place, and time.      Motor: Weakness (Primarily left sided, overall requires more assistance with transfers in last few days.) present.      Gait: Gait abnormal (wheelchair bound).     Psychiatric:          Speech: Speech normal.         Behavior: Behavior is cooperative.         Thought Content: Thought content normal.         Judgment: Judgment normal.         Additional Data:     Labs:    Results from last 7 days   Lab Units 05/27/25  0533 05/22/25  0538   WBC Thousand/uL 12.23* 14.62*   HEMOGLOBIN g/dL 9.7* 9.3*   HEMATOCRIT % 33.5* 31.5*   PLATELETS Thousands/uL 278 289   BANDS PCT %  --  2   LYMPHO PCT % 5* 3*   MONO PCT % 2* 5   EOS PCT % 0 0     Results from last 7 days   Lab Units 05/27/25  0533   SODIUM mmol/L 138   POTASSIUM mmol/L 4.0   CHLORIDE mmol/L 103   CO2 mmol/L 26   BUN mg/dL 29*   CREATININE mg/dL 0.89   ANION GAP mmol/L 9   CALCIUM mg/dL 8.7   GLUCOSE RANDOM mg/dL 70                       Labs reviewed    Imaging:    Imaging reviewed    Recent Cultures (last 7 days):                  M*Modal software was used to dictate this note.  It may contain errors with dictating incorrect words or incorrect spelling. Please contact the provider directly with any questions.

## 2025-05-27 NOTE — PROGRESS NOTES
05/27/25 1400   Restrictions/Precautions   Precautions Bed/chair alarms;Cognitive;Fall Risk;Supervision on toilet/commode;Visual deficit  (Left lean)   Braces or Orthoses Sling  (Left arm more flacid so used sling for shoulder and arm protection)   Sit to Lying   Type of Assistance Needed Physical assistance   Physical Assistance Level 26%-50%   Comment BLE lift - tried to hook right under left but unable needed assist with both into bed   Sit to Lying CARE Score 3   Sit to Stand   Type of Assistance Needed Physical assistance   Physical Assistance Level 26%-50%   Comment Mod A  needs lift assist and L knee blocking-  placed hand on therapist shoulder   Sit to Stand CARE Score 3   Bed-Chair Transfer   Type of Assistance Needed Physical assistance   Physical Assistance Level 26%-50%   Comment Mod A sit pivot xfer worked on both directions,   Also performed std pivot with hand on therapist shoulder  (needed intermittent L knee blocking),  also trialed WBQC  (+ knee buckle on L )   Chair/Bed-to-Chair Transfer CARE Score 3   Walk 10 Feet   Comment used HR   Walk 50 Feet with Two Turns   Reason if not Attempted Safety concerns   Walk 50 Feet with Two Turns CARE Score 88   Walk 150 Feet   Reason if not Attempted Safety concerns   Walk 150 Feet CARE Score 88   Walking 10 Feet on Uneven Surfaces   Reason if not Attempted Safety concerns   Walking 10 Feet on Uneven Surfaces CARE Score 88   Ambulation   Primary Mode of Locomotion Prior to Admission Walk   Distance Walked (feet) 25 ft  (x2)   Assist Device   (handrail)   Provided Assistance with: Balance;Trunk Support   Walk Assist Level Moderate Assist;Maximum Assist;Chair Follow   Findings Amb at handrail in hallway because L arm is too weak to use walker and hand scoop,  L knee wants to buckle so needed blocking with each step,  Pt forward flex at trunk needed some assist for posture - close chair follow,  pt very fatigued after 2 bouts   Does the patient walk? 2. Yes    Curb or Single Stair   Comment too weak to perform   Reason if not Attempted Safety concerns   1 Step (Curb) CARE Score 88   4 Steps   Reason if not Attempted Safety concerns   4 Steps CARE Score 88   12 Steps   Reason if not Attempted Safety concerns   12 Steps CARE Score 88   Therapeutic Interventions   Strengthening Seated LAQ R leg 3#,  L placed ball as target AROM  8x4, bridges in bed 5x,   Repeated sit-stands t/o session   Other Due to new onset of weakness first portion of session trialed variety of std pivot with and without device, also worked on sit pivot xfers    pt with evident L knee instability  at times   Equipment Use   NuStep 10 mins for neuro prime BLE and RUE   Assessment   Treatment Assessment 60 min skilled PT session, OT reported pt is weaker, team is aware and pt got CAT scan prior to session.  Pt fatigued quickly t/o session, used L arm sling due to arm is more flacid and with poor awareness concern for skin and shoulder integrity.  As noted above worked on standing, sit pivot xfers, stand pivot xfers, trialed QC (too at risk for buckle) ,  best was standing infront blocking L knee  with hand on therapist shoulder , or sit pivot xfer.  Performed some strengthening, and walking along handrail.  Cut pts session short because after toileting pt was completely exhausted eyes closed and said she needed to rest.  Cont skilled PT toward LTGs awaiting SNF.   Problem List Decreased strength;Decreased endurance;Impaired balance;Decreased mobility;Decreased coordination;Decreased cognition;Impaired judgement;Decreased safety awareness;Decreased skin integrity   Barriers to Discharge Inaccessible home environment;Decreased caregiver support   Plan   Progress Slow progress, decreased activity tolerance   PT Therapy Minutes   PT Time In 1400   PT Time Out 1500   PT Total Time (minutes) 60   PT Mode of treatment - Individual (minutes) 60   PT Mode of treatment - Concurrent (minutes) 0   PT Mode of  treatment - Group (minutes) 0   PT Mode of treatment - Co-treat (minutes) 0   PT Mode of Treatment - Total time(minutes) 60 minutes   PT Cumulative Minutes 720   Therapy Time missed   Time missed? Yes   Amount of time missed 30   Reason for time missed Extreme fatigue

## 2025-05-27 NOTE — PLAN OF CARE
Problem: PAIN - ADULT  Goal: Verbalizes/displays adequate comfort level or baseline comfort level  Description: Interventions:  - Encourage patient to monitor pain and request assistance  - Assess pain using appropriate pain scale  - Administer analgesics as ordered based on type and severity of pain and evaluate response  - Implement non-pharmacological measures as appropriate and evaluate response  - Consider cultural and social influences on pain and pain management  - Notify physician/advanced practitioner if interventions unsuccessful or patient reports new pain  - Educate patient/family on pain management process including their role and importance of  reporting pain   - Provide non-pharmacologic/complimentary pain relief interventions  Outcome: Progressing     Problem: SAFETY ADULT  Goal: Patient will remain free of falls  Description: INTERVENTIONS:  - Educate patient/family on patient safety including physical limitations  - Instruct patient to call for assistance with activity   - Keep Call bell within reach  - Keep bed low and locked with side rails adjusted as appropriate  - Keep care items and personal belongings within reach  - Initiate and maintain comfort rounds  - Make Fall Risk Sign visible to staff  - Offer Toileting every 2 Hours, in advance of need  - Initiate/Maintain bed/chair alarm  - Obtain necessary fall risk management equipment: bed/chair sensor  - Apply yellow socks and bracelet for high fall risk patients  - Consider moving patient to room near nurses station  Outcome: Progressing     Problem: SKIN/TISSUE INTEGRITY - ADULT  Goal: Skin Integrity remains intact(Skin Breakdown Prevention)  Description: Assess:  -Perform Benjamín assessment every shift  -Clean and moisturize skin every shift  -Inspect skin when repositioning, toileting, and assisting with ADLS  -Assess extremities for adequate circulation and sensation     Bed Management:  -Have minimal linens on bed & keep smooth,  unwrinkled  -Change linens as needed when moist or perspiring  -Avoid sitting or lying in one position for more than 2 hours while in bed  -Keep HOB at 30degrees     Toileting:  -Offer bedside commode  -Assess for incontinence every shift    Activity:  -Mobilize patient 3 times a day  -Encourage activity and walks on unit  -Encourage or provide ROM exercises   -Turn and reposition patient every 2 Hours  -Use appropriate equipment to lift or move patient in bed  -Instruct/ Assist with weight shifting every 2 hours when out of bed in chair  -Consider limitation of chair time 2 hour intervals    Skin Care:  -Avoid use of baby powder, tape, friction and shearing, hot water or constrictive clothing  -Do not massage red bony areas    Outcome: Progressing

## 2025-05-27 NOTE — ASSESSMENT & PLAN NOTE
Presented on 5/12/25 after experiencing mechanical fall with head strike.  Hx of L sided weakness s/p stroke in 04/2025.  CTH showed vasogenic edema L parietal vertex compatible with known brain metastasis, no hemorrhage identified.  MRI brain showed stable enhancing lesions within the L superior occipital lobe and L parietal lobe with surrounding vasogenic edema, stable foci of restricted diffusion within the R posterior frontal lobe most likely sequela of subacute ischemia.    Per Neurology - symptoms likely due to some increase in size of CNS metastasis on top of noted vasogenic edema.  Home dexamethasone increased from 2mg BID to 3mg BID.  Dose decreased back to 2mg BID on 5/23.    Neurovascular checks Q shift.  Follow-up with Neurology in 4-6 weeks as outpatient.    Primary team following.  PT/OT/SLP.

## 2025-05-27 NOTE — ASSESSMENT & PLAN NOTE
Hgb currently stable at 9.7  Baseline 9-10.  Continue home cyanocobalamin 1000mcg daily.  Asymptomatic.  Continue to trend routine CBC.

## 2025-05-27 NOTE — PROGRESS NOTES
Progress Note - PMR   Name: Ayla Nye 74 y.o. 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 Treatments complicated by toxicities and infection.   Most recently receiving Taoxtere with plans to start chemotherapy after discharge   MRI brain showed stable enhancing lesions within the L superior occipital lobe and L parietal lobe with surrounding vasogenic edema, stable foci of restricted diffusion within the R posterior frontal lobe most likely sequela of subacute ischemia.  CT CAP showed multiple lesions with increase in size.    Plan:   Continue decreased dose of dexamethasone 2mg BID.  Continue Bactrim -160mg MWF  Continue Keppra 1000mg BID  See pain insertion   Follows with Dr. Castro (Oncology), Dr Vuong (Radiation/Oncology), and Palliative as outpatient  Monitor for new or worsening pain, decreased ROM,  changes in strength, sensation, balance, and mentation,increased fatigue, SOB, edema, abdominal pain, nausea, vomiting, constipation, wt loss, worsening intake/appetite   HTN (hypertension)  Continue home amlodipine 5mg daily  Monitor BP trends   Insomnia  Continue melatonin 6 mg qHS (increased 5/22) Seroquel 37.5 mg qHs (last increased 5/22), nighttime dose of gabapentin increased on 5/20  Remeron added 5/23 5/23: reached out to on-call oncology, Decadron decreased to 2 mg BID   Decadron dosing modified so second dose is given earlier   Encourage sleep hygiene (routine that promotes healthy circadian rhythm,avoid caffeine later in the day, quiet/dark room at night, reduce electronic before bedtime)  5/26 -5/27: patient able to sleep for 6 hours   Hypothyroidism  Continue home levothyroxine 50mcg daily  Cancer associated pain  Current pain regimen: Scheduled Tylenol,  gabapentin daily and qHS, Lidoderm, and PRN Robaxin   Continue to monitor pain levels and adjust as needed   Anemia  Hgb currently stable at 9.7.  Baseline 9-10.  Continue home cyanocobalamin 1000mcg daily.  Continue to monitor CBC  Leukocytosis  Recently within 10-15 range   Likely steroid induced   Continue to monitor CBC and signs/symptoms of infection   History of pulmonary embolism  Most recent CTA chest in March 2025 negative for PE   Previously on Xarelto but discontinued due to new foci of hemorrhage within the L occipital metastasis (April 2025)   Monitor respiratory status   Per 5/26 documentation, patient was concerned for PE, though imaging not pursued due to lack of symptoms. When I asked why she was concerned today, she states because she did not have any symptoms last time. She states her chest feels tight when she tries to take a deep breath. She denies chest pain, lightheadedness, palpitations, calf pain. Vital signs stable and no LE edema/pain. Discussed with IM, CT chest not felt to be indicated at this time. Will continue to monitor   History of Pneumocystis jirovecii pneumonia  Continue Bactrim 3x weekly   GERD (gastroesophageal reflux disease)  Continue PTA Protonix 40mg daily  History of stroke  Right frontal subacute infarct diagnosed on 04/2025  Residual left hemiparesis   Continue ASA 81mg daily and Lipitor 40mg daily.  Continue with PT/OT  Follow-up with Neurovascular in 4-6 weeks as outpatient.  Fall  Presented on 5/12/25 after experiencing mechanical fall with head strike.  Hx of L sided weakness s/p stroke in 04/2025.  CTH showed vasogenic edema L parietal vertex compatible with known brain metastasis, no hemorrhage identified.  MRI brain showed stable enhancing lesions within the L superior occipital lobe and L parietal lobe with surrounding vasogenic edema, stable foci of restricted diffusion within the R posterior frontal lobe most likely sequela of subacute ischemia.  Per Neurology,  symptoms likely due to some increase in size of CNS metastasis on top of noted vasogenic edema.  Home dexamethasone increased from 2mg BID to 3mg BID ---> decreased back to 2 mg BID due to insomnia   Impaired mobility and ADLs  Patient was evaluated by the rehabilitation team MD and an appropriate candidate for acute inpatient rehabilitation program at this time.  The patient will tolerate 3 hours/day 5 to 7 days/week of intensive physical, speech and occupational therapy   in order to obtain goals for community discharge  Due to the patient's functional Compared to their baseline level of function in addition to their ongoing medical needs, the patient would benefit from daily supervision from a rehabilitation physician as well as rehabilitation nursing to implement and adjust the medical as well as functional plan of care in order to meet the patient's goals.  Bladder: Monitor closely for urinary incontinence and/or retention. Monitor urine output and encourage spontaneous voids. If unable to void spontaneously, will monitor with PVR bladder scans and initiate CIC regimen.  Skin: Monitor for breakdown, frequent turns, pressure offloading  Bowel: Monitor closely for constipation and/or incontinence. Will follow BMs and adjust bowel regimen to target soft, formed stools q1-2 days, per pt's regular schedule  Discharge: pending discharge to SNF     At risk for venous thromboembolism (VTE)  Continue Lovenox while inpatient     Subjective   Patient is a 74 year-old female with a past medical history of stage IV adenocarcinoma of the lung metastatic to brain, history of ischemic stroke in April 2025, GERD, hyperlipidemia, hypertension, history of pulmonary embolism, presenting to [facility] due to a progressive cancer metastases, recent fall, recent stroke and debility. She was admitted from 5/5 to 5/7 due to left-sided hemiparesis as a result of right MCA territory frontal lobe stroke. Xarelto was discontinued in April 2025  after new foci of hemorrhage within the left occipital metastasis She presented to the ED again on 5/12 after a fall with headstrike. Imaging did not show an acute traumatic injury, though did show progression of metastases. Palliative care, neurology, and heme onc consulted. She was evaluated by PT and OT and deemed an appropiate candidate for ARC due to functional deficits.     Chief Complaint: f/u ambulatory dysfunction and increased weakness    Interval: Seen and evaluated patient in room. Discussed decreasing Seroquel. Patient states she slept well for the first time last night (6 hours total). She does not want her medications adjusted at this time, will keep the same for now and re-evaluate tomorrow. Due to worsened generalized weakness, will repeat CT head to assess for changes in edema. Generalized weakness could also be due to lack prolonged lack of quality sleep. She endorses chest tightness but denies chest pain, lightheadedness, palpitations, calf pain. Will continue to monitor vitals and clinical status with low threshold to obtain CTA chest. Last BM 5/25, continent of bowel and bladder. 5/27 labs reviewed    Objective :  Temp:  [96.1 °F (35.6 °C)-98.6 °F (37 °C)] 96.1 °F (35.6 °C)  HR:  [76-88] 88  BP: (125-139)/(63-83) 131/63  Resp:  [20] 20  SpO2:  [92 %-95 %] 95 %  O2 Device: None (Room air)    Functional Update:  Physical Therapy Occupational Therapy Speech Therapy   Weight Bearing Status: Full Weight Bearing  Transfers: Minimal Assistance  Bed Mobility: Incidental Touching  Amulation Distance (ft): 90 feet  Ambulation: Assist of 2  Assistive Device for Ambulation: Roller Walker  Roller Walker Attachments:  (L hand scoop)  Number of Stairs: 4  Assistive Device for Stairs: Bilateral Hand Rails  Stair Assistance: Minimal Assistance  Ramp: Moderate Assistance  Assistive Device for Ramp: Hand Hold Assistance  Discharge Recommendations: Home with:  DC Home with:: First Floor Setup, Family Support, Home  Physical Therapy (TBD pending needs and support at [place])   Eating: Supervision (s/u assist)  Grooming: Supervision  Bathing: Moderate Assistance  Bathing: Moderate Assistance  Upper Body Dressing: Moderate Assistance  Lower Body Dressing: Moderate Assistance (footwear CG/Myla)  Toileting: Minimal Assistance  Tub/Shower Transfer: Moderate Assistance  Toilet Transfer: Minimal Assistance  Cognition: Exceptions to WNL  Cognition: Decreased Attention, Decreased Safety  Orientation: Person, Place, Time, Situation   Mode of Communication: Verbal  Cognition: Exceptions to WNL  Cognition: Decreased Memory, Decreased Executive Functions, Decreased Attention, Decreased Comprehension  Orientation: Person, Place, Time, Situation  Discharge Recommendations: Home with:  DC Home with:: 24 Hour Supervision, Family Support ( services pending progress)         Physical Exam  Vitals and nursing note reviewed.   Constitutional:       General: She is not in acute distress.     Appearance: She is not toxic-appearing or diaphoretic.      Comments: Appears comfortable resting in bed    HENT:      Head: Normocephalic and atraumatic.      Nose: No congestion.      Mouth/Throat:      Mouth: Mucous membranes are moist.   Pulmonary:      Effort: Pulmonary effort is normal. No respiratory distress.   Abdominal:      General: There is no distension.     Musculoskeletal:         General: No tenderness.      Right lower leg: No edema.      Left lower leg: No edema.     Skin:     General: Skin is warm and dry.     Neurological:      Mental Status: She is alert.      Comments: Alert and appropriate to questions, no acute changes in mental status   Increased generalized weakness    Psychiatric:         Mood and Affect: Mood normal.         Behavior: Behavior normal.           Scheduled Meds:  Current Facility-Administered Medications   Medication Dose Route Frequency Provider Last Rate    acetaminophen  975 mg Oral Daily Cristian Dias MD      And     acetaminophen  650 mg Oral Daily Cristian Dias MD      And    acetaminophen  975 mg Oral Daily Cristian Dias MD      And    acetaminophen  650 mg Oral Daily Cristian Dias MD      aluminum-magnesium hydroxide-simethicone  30 mL Oral Q4H PRN Ata Cunningham MD      amLODIPine  10 mg Oral Daily ZULEYMA Viramontes      aspirin  81 mg Oral Daily Claudine Leos PA-C      atorvastatin  40 mg Oral Daily With Dinner Claudine Leos PA-C      vitamin B-12  1,000 mcg Oral Daily Claudine Leos PA-C      dexamethasone  2 mg Oral BID Claudine Leos PA-C      Diclofenac Sodium  2 g Topical 4x Daily Claudine Leos PA-C      enoxaparin  40 mg Subcutaneous Q24H Claudine Leos PA-C      gabapentin  200 mg Oral Daily Claudine Leos PA-C      And    gabapentin  400 mg Oral HS Claudine Leos PA-C      HYDROmorphone  2 mg Oral Q4H PRN Nori Goodwin MD      levETIRAcetam  1,000 mg Oral BID Claudine Leos PA-C      levothyroxine  50 mcg Oral Early Morning Claudine Leos PA-C      lidocaine  1 patch Topical Daily Claudine Leos PA-C      lisinopril  5 mg Oral Daily ZULEYMA Viramontes      methocarbamol  250 mg Oral Q6H PRN Claudine Leos PA-C      mirtazapine  7.5 mg Oral HS Cristian Dias MD      naloxone  2 mg Nasal Daily PRN Nori Goodwin MD      pantoprazole  40 mg Oral Daily Before Breakfast Claudine Leos PA-C      polyethylene glycol  17 g Oral Daily PRN Nori Goodwin MD      QUEtiapine  37.5 mg Oral HS Claudine Leos PA-C      senna  2 tablet Oral Daily Nori Goodwin MD      sulfamethoxazole-trimethoprim  1 tablet Oral Once per day on Monday Wednesday Friday Claudine Leos PA-C           Lab Results: I have reviewed the following results:  Results from last 7 days   Lab Units 05/27/25  0533 05/22/25  0538   HEMOGLOBIN g/dL 9.7* 9.3*   HEMATOCRIT % 33.5* 31.5*   WBC Thousand/uL 12.23* 14.62*   PLATELETS Thousands/uL 278 289     Results from last 7 days   Lab Units 05/27/25  05  05/22/25  0538   BUN mg/dL 29* 29*   SODIUM mmol/L 138 139   POTASSIUM mmol/L 4.0 4.3   CHLORIDE mmol/L 103 103   CREATININE mg/dL 0.89 0.80            Claudine Leos PA-C  Physical Medicine and Rehabilitation  [facility]

## 2025-05-27 NOTE — PLAN OF CARE
Problem: SAFETY ADULT  Goal: Patient will remain free of falls  Description: INTERVENTIONS:  - Educate patient/family on patient safety including physical limitations  - Instruct patient to call for assistance with activity   - Keep Call bell within reach  - Keep bed low and locked with side rails adjusted as appropriate  - Keep care items and personal belongings within reach  - Initiate and maintain comfort rounds  - Make Fall Risk Sign visible to staff  - Offer Toileting every 2 Hours, in advance of need  - Initiate/Maintain bed/chair alarm  - Obtain necessary fall risk management equipment: bed/chair sensor  - Apply yellow socks and bracelet for high fall risk patients  - Consider moving patient to room near nurses station  Outcome: Progressing     Problem: MOBILITY - ADULT  Goal: Maintain or return to baseline ADL function  Description: INTERVENTIONS:  -  Assess patient's ability to carry out ADLs; assess patient's baseline for ADL function and identify physical deficits which impact ability to perform ADLs (bathing, care of mouth/teeth, toileting, grooming, dressing, etc.)  - Assess/evaluate cause of self-care deficits   - Assess range of motion  - Assess patient's mobility; develop plan if impaired  - Assess patient's need for assistive devices and provide as appropriate  - Encourage maximum independence but intervene and supervise when necessary  - Involve family in performance of ADLs  - Assess for home care needs following discharge   - Provide patient education as appropriate  - Monitor gait, balance and fatigue with ambulation    Outcome: Progressing   Pt requires assist of 2 for transfers

## 2025-05-27 NOTE — ASSESSMENT & PLAN NOTE
WBC count currently stable at 12.23.  No active s/s of infection.  Likely steroid induced.  Continue to trend routine CBC.

## 2025-05-27 NOTE — CASE MANAGEMENT
Case Management    Spoke to daughter Juliana regarding SNF choices daughter said first choice was [facility]. [facility] initially denied due to patient needing chemo treatment. Daughter did report they will not be pursuing continued chemo treatment. Will confirm with the team that plan and stated to daughter we can resubmit referral. Daughter also made aware that MD made medication changes Juliana aware we will update her Thursday on discharge date/plan based on medication change.

## 2025-05-27 NOTE — ASSESSMENT & PLAN NOTE
Diagnosed in 07/2024.  Hx of radiation in 08/2024 and 01/2025.  Hx of hold on treatments due to multiple admission/toxicities/PCP pneumonia/PE.  Most recently receiving Taoxtere with plans to start chemotherapy after admission.    MRI brain showed stable enhancing lesions within the L superior occipital lobe and L parietal lobe with surrounding vasogenic edema, stable foci of restricted diffusion within the R posterior frontal lobe most likely sequela of subacute ischemia.  CT CAP showed multiple lesions with increase in size.    Continue increased dose of dexamethasone 3mg BID.  Decreased to 2mg BID on 5/23 after discussion between Primary team and Oncology.  Continue Bactrim -160mg MWF.  Continue Keppra 1000mg BID.    Follows with Dr. Castro (Oncology), Dr Vuong (Radiation/Oncology), and Palliative as outpatient.  Follow-up with Neurology in 4-6 weeks as outpatient.    Noted to have worsening L sided weakness through the weekend and today - obtain CTH.

## 2025-05-27 NOTE — ASSESSMENT & PLAN NOTE
Patient was evaluated by the rehabilitation team MD and an appropriate candidate for acute inpatient rehabilitation program at this time.  The patient will tolerate 3 hours/day 5 to 7 days/week of intensive physical, speech and occupational therapy   in order to obtain goals for community discharge  Due to the patient's functional Compared to their baseline level of function in addition to their ongoing medical needs, the patient would benefit from daily supervision from a rehabilitation physician as well as rehabilitation nursing to implement and adjust the medical as well as functional plan of care in order to meet the patient's goals.  Bladder: Monitor closely for urinary incontinence and/or retention. Monitor urine output and encourage spontaneous voids. If unable to void spontaneously, will monitor with PVR bladder scans and initiate CIC regimen.  Skin: Monitor for breakdown, frequent turns, pressure offloading  Bowel: Monitor closely for constipation and/or incontinence. Will follow BMs and adjust bowel regimen to target soft, formed stools q1-2 days, per pt's regular schedule  Discharge: pending discharge to SNF

## 2025-05-27 NOTE — ASSESSMENT & PLAN NOTE
Most recent CTA chest in March 2025 negative for PE   Previously on Xarelto but discontinued due to new foci of hemorrhage within the L occipital metastasis (April 2025)   Monitor respiratory status   Per 5/26 documentation, patient was concerned for PE, though imaging not pursued due to lack of symptoms. When I asked why she was concerned today, she states because she did not have any symptoms last time. She states her chest feels tight when she tries to take a deep breath. She denies chest pain, lightheadedness, palpitations, calf pain. Vital signs stable and no LE edema/pain. Discussed with IM, CT chest not felt to be indicated at this time. Will continue to monitor

## 2025-05-27 NOTE — ASSESSMENT & PLAN NOTE
Hgb currently stable at 9.7.  Baseline 9-10.  Continue home cyanocobalamin 1000mcg daily.  Continue to monitor CBC

## 2025-05-27 NOTE — ASSESSMENT & PLAN NOTE
Continue melatonin 6 mg qHS (increased 5/22) Seroquel 37.5 mg qHs (last increased 5/22), nighttime dose of gabapentin increased on 5/20  Remeron added 5/23 5/23: reached out to on-call oncology, Decadron decreased to 2 mg BID   Decadron dosing modified so second dose is given earlier   Encourage sleep hygiene (routine that promotes healthy circadian rhythm,avoid caffeine later in the day, quiet/dark room at night, reduce electronic before bedtime)  5/26 -5/27: patient able to sleep for 6 hours    ICU Rounds: Cont OT/PT per treatment plan. Pt scheduled for possible PEG placement tomorrow.

## 2025-05-27 NOTE — ASSESSMENT & PLAN NOTE
HPI:   Diagnosed in 07/2024, s/p radiation in 08/2024 and 01/2025. Treatments complicated by toxicities and infection.   Most recently receiving Taoxtere with plans to start chemotherapy after discharge   MRI brain showed stable enhancing lesions within the L superior occipital lobe and L parietal lobe with surrounding vasogenic edema, stable foci of restricted diffusion within the R posterior frontal lobe most likely sequela of subacute ischemia.  CT CAP showed multiple lesions with increase in size.    Plan:   Continue decreased dose of dexamethasone 2mg BID.  Continue Bactrim -160mg MWF  Continue Keppra 1000mg BID  See pain insertion   Follows with Dr. Castro (Oncology), Dr Vuong (Radiation/Oncology), and Palliative as outpatient  Monitor for new or worsening pain, decreased ROM,  changes in strength, sensation, balance, and mentation,increased fatigue, SOB, edema, abdominal pain, nausea, vomiting, constipation, wt loss, worsening intake/appetite

## 2025-05-28 LAB
BACTERIA UR QL AUTO: ABNORMAL /HPF
BILIRUB UR QL STRIP: NEGATIVE
CLARITY UR: ABNORMAL
COLOR UR: ABNORMAL
GLUCOSE UR STRIP-MCNC: NEGATIVE MG/DL
HGB UR QL STRIP.AUTO: 250
KETONES UR STRIP-MCNC: NEGATIVE MG/DL
LEUKOCYTE ESTERASE UR QL STRIP: 25
NITRITE UR QL STRIP: NEGATIVE
NON-SQ EPI CELLS URNS QL MICRO: ABNORMAL /HPF
PH UR STRIP.AUTO: 6 [PH]
PROT UR STRIP-MCNC: ABNORMAL MG/DL
RBC #/AREA URNS AUTO: ABNORMAL /HPF
SP GR UR STRIP.AUTO: 1.01 (ref 1–1.04)
UROBILINOGEN UA: NEGATIVE MG/DL
WBC #/AREA URNS AUTO: ABNORMAL /HPF

## 2025-05-28 PROCEDURE — 97129 THER IVNTJ 1ST 15 MIN: CPT

## 2025-05-28 PROCEDURE — 99233 SBSQ HOSP IP/OBS HIGH 50: CPT

## 2025-05-28 PROCEDURE — 97130 THER IVNTJ EA ADDL 15 MIN: CPT

## 2025-05-28 PROCEDURE — 97542 WHEELCHAIR MNGMENT TRAINING: CPT

## 2025-05-28 PROCEDURE — 97530 THERAPEUTIC ACTIVITIES: CPT

## 2025-05-28 PROCEDURE — 97110 THERAPEUTIC EXERCISES: CPT

## 2025-05-28 PROCEDURE — 81001 URINALYSIS AUTO W/SCOPE: CPT | Performed by: NURSE PRACTITIONER

## 2025-05-28 PROCEDURE — 99232 SBSQ HOSP IP/OBS MODERATE 35: CPT | Performed by: INTERNAL MEDICINE

## 2025-05-28 PROCEDURE — 97112 NEUROMUSCULAR REEDUCATION: CPT

## 2025-05-28 PROCEDURE — 97535 SELF CARE MNGMENT TRAINING: CPT

## 2025-05-28 PROCEDURE — 81003 URINALYSIS AUTO W/O SCOPE: CPT | Performed by: NURSE PRACTITIONER

## 2025-05-28 RX ADMIN — HYDROMORPHONE HYDROCHLORIDE 2 MG: 2 TABLET ORAL at 21:07

## 2025-05-28 RX ADMIN — ACETAMINOPHEN 650 MG: 325 TABLET, FILM COATED ORAL at 11:56

## 2025-05-28 RX ADMIN — SULFAMETHOXAZOLE AND TRIMETHOPRIM 1 TABLET: 800; 160 TABLET ORAL at 08:23

## 2025-05-28 RX ADMIN — ATORVASTATIN CALCIUM 40 MG: 40 TABLET, FILM COATED ORAL at 16:56

## 2025-05-28 RX ADMIN — ACETAMINOPHEN 975 MG: 325 TABLET, FILM COATED ORAL at 17:00

## 2025-05-28 RX ADMIN — DICLOFENAC SODIUM 2 G: 10 GEL TOPICAL at 11:56

## 2025-05-28 RX ADMIN — ASPIRIN 81 MG CHEWABLE TABLET 81 MG: 81 TABLET CHEWABLE at 08:22

## 2025-05-28 RX ADMIN — ACETAMINOPHEN 975 MG: 325 TABLET, FILM COATED ORAL at 06:15

## 2025-05-28 RX ADMIN — GABAPENTIN 400 MG: 400 CAPSULE ORAL at 21:06

## 2025-05-28 RX ADMIN — GABAPENTIN 200 MG: 100 CAPSULE ORAL at 08:22

## 2025-05-28 RX ADMIN — LEVOTHYROXINE SODIUM 50 MCG: 0.05 TABLET ORAL at 06:14

## 2025-05-28 RX ADMIN — LEVETIRACETAM 1000 MG: 500 TABLET, FILM COATED ORAL at 21:06

## 2025-05-28 RX ADMIN — DEXAMETHASONE 2 MG: 4 TABLET ORAL at 14:20

## 2025-05-28 RX ADMIN — DEXAMETHASONE 2 MG: 4 TABLET ORAL at 06:14

## 2025-05-28 RX ADMIN — MIRTAZAPINE 7.5 MG: 7.5 TABLET, FILM COATED ORAL at 19:34

## 2025-05-28 RX ADMIN — CYANOCOBALAMIN TAB 1000 MCG 1000 MCG: 1000 TAB at 08:23

## 2025-05-28 RX ADMIN — ENOXAPARIN SODIUM 40 MG: 40 INJECTION SUBCUTANEOUS at 14:20

## 2025-05-28 RX ADMIN — LIDOCAINE 5% 1 PATCH: 700 PATCH TOPICAL at 08:21

## 2025-05-28 RX ADMIN — QUETIAPINE FUMARATE 25 MG: 25 TABLET ORAL at 21:06

## 2025-05-28 RX ADMIN — PANTOPRAZOLE SODIUM 40 MG: 40 TABLET, DELAYED RELEASE ORAL at 06:14

## 2025-05-28 RX ADMIN — LEVETIRACETAM 1000 MG: 500 TABLET, FILM COATED ORAL at 08:23

## 2025-05-28 NOTE — NURSING NOTE
Patient voided mod to lg amount of blood-tinged urine at 1600.  PVR 0.  Patient denies having any urgency, frequency or burning with urination.  NP aware.  This RN will encourage po fluids and obtain UA C&S with next void.  Patient afebrile; VSS.

## 2025-05-28 NOTE — PROGRESS NOTES
Progress Note - PMR   Name: Ayla Nye 74 y.o. 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 Treatments complicated by toxicities and infection.   Most recently receiving Taoxtere with plans to start chemotherapy after discharge   MRI brain showed stable enhancing lesions within the L superior occipital lobe and L parietal lobe with surrounding vasogenic edema, stable foci of restricted diffusion within the R posterior frontal lobe most likely sequela of subacute ischemia.  CT CAP showed multiple lesions with increase in size.    Plan:   Continue decreased dose of dexamethasone 2mg BID.  Continue Bactrim -160mg MWF  Continue Keppra 1000mg BID  See pain insertion   Follows with Dr. Castro (Oncology), Dr Vuong (Radiation/Oncology), and Palliative as outpatient  Monitor for new or worsening pain, decreased ROM,  changes in strength, sensation, balance, and mentation,increased fatigue, SOB, edema, abdominal pain, nausea, vomiting, constipation, wt loss, worsening intake/appetite   HTN (hypertension)  Continue home amlodipine 5mg daily  Monitor BP trends   Insomnia  Continue melatonin 6 mg qHS (increased 5/22) Seroquel 37.5 mg qHs (last increased 5/22), nighttime dose of gabapentin increased on 5/20  Remeron added 5/23 5/23: reached out to on-call oncology, Decadron decreased to 2 mg BID   Decadron dosing modified so second dose is given earlier   Encourage sleep hygiene (routine that promotes healthy circadian rhythm,avoid caffeine later in the day, quiet/dark room at night, reduce electronic before bedtime)  Continued improvement in insomnia     Hypothyroidism  Continue home levothyroxine 50mcg daily  Cancer associated pain  Current pain regimen: Scheduled Tylenol, gabapentin  daily and qHS, Lidoderm, and PRN Robaxin   Continue to monitor pain levels and adjust as needed   Anemia  Hgb currently stable at 9.7.  Baseline 9-10.  Continue home cyanocobalamin 1000mcg daily.  Continue to monitor CBC  Leukocytosis  Recently within 10-15 range   Likely steroid induced   Continue to monitor CBC and signs/symptoms of infection   History of pulmonary embolism  Most recent CTA chest in March 2025 negative for PE   Previously on Xarelto but discontinued due to new foci of hemorrhage within the L occipital metastasis (April 2025)   Monitor respiratory status   Per 5/26 documentation, patient was concerned for PE, though imaging not pursued due to lack of symptoms. When I asked why she was concerned today, she states because she did not have any symptoms last time. She states her chest feels tight when she tries to take a deep breath. She denies chest pain, lightheadedness, palpitations, calf pain. Vital signs stable and no LE edema/pain. Discussed with IM, CT chest not felt to be indicated at this time. Will continue to monitor   History of Pneumocystis jirovecii pneumonia  Continue Bactrim 3x weekly   GERD (gastroesophageal reflux disease)  Continue PTA Protonix 40mg daily  History of stroke  Right frontal subacute infarct diagnosed on 04/2025  Residual left hemiparesis   Continue ASA 81mg daily and Lipitor 40mg daily.  Continue with PT/OT  Follow-up with Neurovascular in 4-6 weeks as outpatient.  Fall  Presented on 5/12/25 after experiencing mechanical fall with head strike.  Hx of L sided weakness s/p stroke in 04/2025.  CTH showed vasogenic edema L parietal vertex compatible with known brain metastasis, no hemorrhage identified.  MRI brain showed stable enhancing lesions within the L superior occipital lobe and L parietal lobe with surrounding vasogenic edema, stable foci of restricted diffusion within the R posterior frontal lobe most likely sequela of subacute ischemia.  Per Neurology, symptoms  likely due to some increase in size of CNS metastasis on top of noted vasogenic edema.  Home dexamethasone increased from 2mg BID to 3mg BID ---> decreased back to 2 mg BID due to insomnia   Impaired mobility and ADLs  Patient was evaluated by the rehabilitation team MD and an appropriate candidate for acute inpatient rehabilitation program at this time.  The patient will tolerate 3 hours/day 5 to 7 days/week of intensive physical, speech and occupational therapy   in order to obtain goals for community discharge  Due to the patient's functional Compared to their baseline level of function in addition to their ongoing medical needs, the patient would benefit from daily supervision from a rehabilitation physician as well as rehabilitation nursing to implement and adjust the medical as well as functional plan of care in order to meet the patient's goals.  Bladder: Monitor closely for urinary incontinence and/or retention. Monitor urine output and encourage spontaneous voids. If unable to void spontaneously, will monitor with PVR bladder scans and initiate CIC regimen.  Skin: Monitor for breakdown, frequent turns, pressure offloading  Bowel: Monitor closely for constipation and/or incontinence. Will follow BMs and adjust bowel regimen to target soft, formed stools q1-2 days, per pt's regular schedule  Discharge: pending discharge to SNF     At risk for venous thromboembolism (VTE)  Continue Lovenox while inpatient     Subjective   Patient is a 74 year-old female with a past medical history of stage IV adenocarcinoma of the lung metastatic to brain, history of ischemic stroke in April 2025, GERD, hyperlipidemia, hypertension, history of pulmonary embolism, presenting to [facility] due to a progressive cancer metastases, recent fall, recent stroke and debility. She was admitted from 5/5 to 5/7 due to left-sided hemiparesis as a result of right MCA territory frontal lobe stroke. Xarelto was discontinued in April 2025 after new  foci of hemorrhage within the left occipital metastasis She presented to the ED again on 5/12 after a fall with headstrike. Imaging did not show an acute traumatic injury, though did show progression of metastases. Palliative care, neurology, and heme onc consulted. She was evaluated by PT and OT and deemed an appropiate candidate for ARC due to functional deficits.     Chief Complaint: f/u ambulatory dysfunction and increased weakness    Interval:   Seen and evaluated patient in room. She feels well and denies any acute concerns. She endorses quality sleep last night. Last BM 5/27, continent of bowel and bladder. Sepsis protocol triggered last night (temp 35.6 with leukocytosis), vitals stable.     Objective :  Temp:  [97.4 °F (36.3 °C)-98.8 °F (37.1 °C)] 98.7 °F (37.1 °C)  HR:  [80-84] 80  BP: (104-140)/(65-70) 104/66  Resp:  [18] 18  SpO2:  [92 %-96 %] 92 %  O2 Device: None (Room air)    Functional Update:  Physical Therapy Occupational Therapy Speech Therapy   Weight Bearing Status: Full Weight Bearing  Transfers: Moderate Assistance  Bed Mobility: Moderate Assistance  Amulation Distance (ft): 25 feet  Ambulation:  (handrail)  Assistive Device for Ambulation: Other  Roller Walker Attachments:  (L hand scoop)  Number of Stairs: 4  Assistive Device for Stairs: Bilateral Hand Rails  Stair Assistance: Minimal Assistance  Ramp: Moderate Assistance  Assistive Device for Ramp: Hand Hold Assistance  Discharge Recommendations: Skilled Nursing Facility  [place] Home with:: First Floor Setup, Family Support, Home Physical Therapy (TBD pending needs and support at [place])   Eating: Supervision (s/u assist)  Grooming: Supervision  Bathing: Minimal Assistance  Bathing: Minimal Assistance  Upper Body Dressing: Supervision  Lower Body Dressing: Moderate Assistance  Toileting: Total Assistance  Tub/Shower Transfer: Minimal Assistance  Toilet Transfer: Maximum Assistance  Cognition: Exceptions to WNL  Cognition: Decreased Memory,  Decreased Attention, Decreased Safety  Orientation: Person, Place, Time   Mode of Communication: Verbal  Speech/Language:  (minimal word finding deficits)  Cognition: Exceptions to WNL  Cognition: Decreased Memory, Decreased Executive Functions, Decreased Attention, Decreased Comprehension  Orientation: Person, Place, Time, Situation  Discharge Recommendations:  (transition to subacute level of care expected)  [place] Home with:: 24 Hour Supervision, Family Support ( services pending progress)         Physical Exam  Vitals and nursing note reviewed.   Constitutional:       General: She is not in acute distress.     Appearance: She is not toxic-appearing or diaphoretic.      Comments: Appears comfortable resting in bed    HENT:      Head: Normocephalic and atraumatic.      Nose: No congestion.      Mouth/Throat:      Mouth: Mucous membranes are moist.   Pulmonary:      Effort: Pulmonary effort is normal. No respiratory distress.   Abdominal:      General: There is no distension.     Musculoskeletal:         General: No tenderness.      Right lower leg: No edema.      Left lower leg: No edema.     Skin:     General: Skin is warm and dry.     Neurological:      Mental Status: She is alert.      Comments: Alert and appropriate to questions, no acute changes in mental status   Increased generalized weakness, LUE MMT stable from admission (2-3)  Able to move all other extremities against gravity    Psychiatric:         Mood and Affect: Mood normal.         Behavior: Behavior normal.         Scheduled Meds:  Current Facility-Administered Medications   Medication Dose Route Frequency Provider Last Rate    acetaminophen  975 mg Oral Daily Cristian Dias MD      And    acetaminophen  650 mg Oral Daily Cristian Dias MD      And    acetaminophen  975 mg Oral Daily Cristian Dias MD      And    acetaminophen  650 mg Oral Daily Cristian Dias MD      aluminum-magnesium hydroxide-simethicone  30 mL Oral Q4H PRN Ata  MD Marisa      amLODIPine  10 mg Oral Daily ZULEYMA Viramontes      aspirin  81 mg Oral Daily Claudine Leos PA-C      atorvastatin  40 mg Oral Daily With Dinner Claudine Leos PA-C      vitamin B-12  1,000 mcg Oral Daily Claudine Leos PA-C      dexamethasone  2 mg Oral BID Claudine Leos PA-C      Diclofenac Sodium  2 g Topical 4x Daily Claudine Leos PA-C      enoxaparin  40 mg Subcutaneous Q24H Claudine Leos PA-C      gabapentin  200 mg Oral Daily Claudine Leos PA-C      And    gabapentin  400 mg Oral HS Claudine Leos PA-C      HYDROmorphone  2 mg Oral Q4H PRN Nori Goodwin MD      levETIRAcetam  1,000 mg Oral BID Claudine Leos PA-C      levothyroxine  50 mcg Oral Early Morning Claudine Leos PA-C      lidocaine  1 patch Topical Daily Claudine Leos PA-C      methocarbamol  250 mg Oral Q6H PRN Claudine Leos PA-C      mirtazapine  7.5 mg Oral HS Cristian Dias MD      naloxone  2 mg Nasal Daily PRN Nori Goodwin MD      pantoprazole  40 mg Oral Daily Before Breakfast Claudine Leos PA-C      polyethylene glycol  17 g Oral Daily PRN Nori Goodwin MD      QUEtiapine  12.5 mg Oral Daily PRN Ciaran Acuña MD      QUEtiapine  25 mg Oral HS Ciaran Acuña MD      senna  2 tablet Oral Daily Nori Goodwin MD      sulfamethoxazole-trimethoprim  1 tablet Oral Once per day on Monday Wednesday Friday Claudine Leos PA-C           Lab Results: I have reviewed the following results:  Results from last 7 days   Lab Units 05/27/25  0533 05/22/25  0538   HEMOGLOBIN g/dL 9.7* 9.3*   HEMATOCRIT % 33.5* 31.5*   WBC Thousand/uL 12.23* 14.62*   PLATELETS Thousands/uL 278 289     Results from last 7 days   Lab Units 05/27/25  0533 05/22/25  0538   BUN mg/dL 29* 29*   SODIUM mmol/L 138 139   POTASSIUM mmol/L 4.0 4.3   CHLORIDE mmol/L 103 103   CREATININE mg/dL 0.89 0.80            Claudine Leos PA-C  Physical Medicine and Rehabilitation  [facility]

## 2025-05-28 NOTE — PROGRESS NOTES
05/28/25 1230   Pain Assessment   Pain Assessment Tool 0-10   Pain Score No Pain   Restrictions/Precautions   Precautions Bed/chair alarms;Cognitive;Fall Risk;Supervision on toilet/commode;Visual deficit   Braces or Orthoses Sling  (L UE)   Cognition   Overall Cognitive Status Impaired   Arousal/Participation Alert;Cooperative   Attention Attends with cues to redirect   Following Commands Follows one step commands with increased time or repetition   Subjective   Subjective reports no feeling in L UE or LE   Roll Left and Right   Type of Assistance Needed Physical assistance;Adaptive equipment   Physical Assistance Level 25% or less   Comment rolls tothen R with min A and  L with  CG and use of rail   Roll Left and Right CARE Score 3   Sit to Lying   Type of Assistance Needed Physical assistance   Physical Assistance Level 26%-50%   Comment mod A needed to  L and R  (  slightly more to the L   Sit to Lying CARE Score 3   Lying to Sitting on Side of Bed   Type of Assistance Needed Physical assistance   Physical Assistance Level 26%-50%   Comment min/ mod A to the  R andmod A to the L   Lying to Sitting on Side of Bed CARE Score 3   Sit to Stand   Type of Assistance Needed Physical assistance   Physical Assistance Level 25% or less   Comment for stand pivots and at then rail   Sit to Stand CARE Score 3   Bed-Chair Transfer   Type of Assistance Needed Physical assistance   Physical Assistance Level 26%-50%   Comment pt performed  stand pivot and sit  pivot transfers to then L and  to the R  with  iincrease A needed to the L adn  blocking at L knee - performed  2  times each way   Chair/Bed-to-Chair Transfer CARE Score 3   Ambulation   Primary Mode of Locomotion Prior to Admission Walk   Distance Walked (feet) 15 ft   Assist Device Other  (along rail)   Gait Pattern Antalgic;Inconsistant Aparna;Decreased foot clearance;L hemiparesis;L knee leonides;Step through;Improper weight shift   Limitations Noted In  Balance;Coordination;Endurance;Posture;Speed;Strength   Provided Assistance with: Balance;Trunk Support;Weight Shift;Limb Advancement   Walk Assist Level Moderate Assist  (and wc follow)   Findings pt amb along the rail  wiht ant A to occ advance LLE and to  block L knee on stance - cues and slight A for proper  wt shift   Does the patient walk? 2. Yes   Wheelchair mobility   Method Right upper extremity;Right lower extremity  (attempted to use  LLe  but kept getting  stuck under chair  as pt was not able to  keep lifting and advancing it)   Assistance Provided For Locking Brakes;Obstacles   Distance Level Surface (feet) 40 ft   Findings pt able to use R UE and LE to  propel  but had great difficulty with steering  - obstacle management and  turns  - needing  A   Does the patient use a wheelchair? 1. Yes   Type of Wheelchair Used 1. Manual   Therapeutic Interventions   Strengthening sup for bridging  x 20 reps , heel  slides with resistance  for  R flex and  ext adn RICHARD on L , SAQs wiht  resistance on R adn P-AA on  L   Balance pt sat on edge of mat to perform elbow to upright  5 times each side wiht  occ A needed , lean  backs to sit forward x 10 reps iwht  occ A needed and  reaching ex across midline  - overhead and to  shin level wiht occ  severe LOB  to the L   Assessment   Treatment Assessment pt particiapted in  60' session with  focus on  seated balance and transfers  - short walk in [place] wiht rail for  support -  pt used  sling on  L UE and needed blocking at L knee for transfers and standing - decrease seated balance wiht  LOB to the L  - santiago with  challenges . pt reports no feeling in LUE and LE - cued to watch leg during  ex  for feedback . pt very fatigued after session - assited back to bed to rest   PT Barriers   Physical Impairment Decreased strength;Decreased endurance;Impaired balance;Decreased mobility;Decreased cognition;Impaired sensation;Impaired tone   Functional Limitation Car  transfers;Standing;Transfers;Walking;Wheelchair management   Plan   Treatment/Interventions Functional transfer training;LE strengthening/ROM;Therapeutic exercise;Endurance training;Bed mobility;Gait training   PT Therapy Minutes   PT Time In 1230   PT Time Out 1330   PT Total Time (minutes) 60   PT Mode of treatment - Individual (minutes) 60   PT Mode of treatment - Concurrent (minutes) 0   PT Mode of treatment - Group (minutes) 0   PT Mode of treatment - Co-treat (minutes) 0   PT Mode of Treatment - Total time(minutes) 60 minutes   PT Cumulative Minutes 780

## 2025-05-28 NOTE — PLAN OF CARE
Problem: PAIN - ADULT  Goal: Verbalizes/displays adequate comfort level or baseline comfort level  Description: Interventions:  - Encourage patient to monitor pain and request assistance  - Assess pain using appropriate pain scale  - Administer analgesics as ordered based on type and severity of pain and evaluate response  - Implement non-pharmacological measures as appropriate and evaluate response  - Consider cultural and social influences on pain and pain management  - Notify physician/advanced practitioner if interventions unsuccessful or patient reports new pain  - Educate patient/family on pain management process including their role and importance of  reporting pain   - Provide non-pharmacologic/complimentary pain relief interventions  Outcome: Progressing     Problem: DISCHARGE PLANNING  Goal: Discharge to home or other facility with appropriate resources  Description: INTERVENTIONS:  - Identify barriers to discharge w/patient and caregiver  - Arrange for needed discharge resources and transportation as appropriate  - Identify discharge learning needs (meds, wound care, etc.)  - Refer to Case Management Department for coordinating discharge planning if the patient needs post-hospital services based on physician/advanced practitioner order or complex needs related to functional status, cognitive ability, or social support system  Outcome: Progressing     Problem: MOBILITY - ADULT  Goal: Maintain or return to baseline ADL function  Description: INTERVENTIONS:  -  Assess patient's ability to carry out ADLs; assess patient's baseline for ADL function and identify physical deficits which impact ability to perform ADLs (bathing, care of mouth/teeth, toileting, grooming, dressing, etc.)  - Assess/evaluate cause of self-care deficits   - Assess range of motion  - Assess patient's mobility; develop plan if impaired  - Assess patient's need for assistive devices and provide as appropriate  - Encourage maximum  independence but intervene and supervise when necessary  - Involve family in performance of ADLs  - Assess for home care needs following discharge   - Provide patient education as appropriate  - Monitor gait, balance and fatigue with ambulation    Outcome: Progressing

## 2025-05-28 NOTE — PROGRESS NOTES
05/28/25 0945   Pain Assessment   Pain Assessment Tool 0-10   Pain Score No Pain   Restrictions/Precautions   Precautions Bed/chair alarms;Cognitive;Fall Risk;Pain;Supervision on toilet/commode;Visual deficit   Comprehension   Comprehension (FIM) 5 - Understands basic directions and conversation   Expression   Expression (FIM) 5 - Needs help/cues only RARELY (< 10% of the time)   Social Interaction   Social Interaction (FIM) 6 - Interacts appropriately with others BUT requires extra  time   Problem Solving   Problem solving (FIM) 4 - Solves basic problems 75-89% of time   Memory   Memory (FIM) 4 - Recognizes/recalls/performs 75-89%   Speech/Language/Cognition Assessmetn   Treatment Assessment Pt was awake, alert and cooperative for session to where current SLP had not seen pt in some sessions. Pt is able to recall most recent events in regard to medication changes (decrease in steroid) which pt did report better sleep past days. Additionally, pt is able to verbalize the plan for transition to subacute level of care and provided SLP w/ choices which she and her family have chosen. Pt also able to recall that the tasks a covering SLP had completed w/ her the other session which was more complex in nature. Pt did express mild frustration given these tasks, but still does not want to shy away from completing tasks in ST sessions.     Otherwise, pt engaged in higher level word finding task/category matrix task to where pt was provided w/ 4 common categories and then 4 different initial letters to determine an object which does fit the letter and category. Pt was able to independently determine 11/16 objects to fit most of the categories. Pt did have more difficulty given harder letters for 2 categories (m and g for vegetables and units of measure). When providing single semantic cue pt increased to 13/16 accurate, but w/ increased semantic cues (x2-3) pt was then 16/16 accurate. Next task completed was a verbal  problem solving task given a problem w/ a stated solution. Pt was to determine whether solution provided was good vs bad and if a bad solution, then pt to provide reasoning which was more logical. Pt was 10/11 accurate, noting mild difficulty w/ more complex problem/situation, benefiting from increased probing clues to determine more appropriate solution. At this time, pt will continue to benefit from ongoing skilled SLP services to maximize functional cognitive linguistic skills in hopes for decreasing caregiver burden over time.   SLP Therapy Minutes   SLP Time In 0930   SLP Time Out 1000   SLP Total Time (minutes) 30   SLP Mode of treatment - Individual (minutes) 30   SLP Mode of treatment - Concurrent (minutes) 0   SLP Mode of treatment - Group (minutes) 0   SLP Mode of treatment - Co-treat (minutes) 0   SLP Mode of Treatment - Total time(minutes) 30 minutes   SLP Cumulative Minutes 285   Therapy Time missed   Time missed? No

## 2025-05-28 NOTE — PROGRESS NOTES
Problem: Alteration in Thoughts and Perception  Goal: Complete daily ADLs, including personal hygiene independently, as able  Description: Interventions:  - Observe, teach, and assist patient with ADLS  - Monitor and promote a balance of rest/activity, with adequate nutrition and elimination   Outcome: Progressing      "OT TREATMENT       05/28/25 1000   Pain Assessment   Pain Assessment Tool 0-10   Pain Score No Pain   Restrictions/Precautions   Precautions Bed/chair alarms;Cognitive;Fall Risk;Pain;Supervision on toilet/commode;Visual deficit   Weight Bearing Restrictions No   ROM Restrictions No   Lifestyle   Autonomy \"It's time to shower\"   Oral Hygiene   Type of Assistance Needed Set-up / clean-up   Physical Assistance Level No physical assistance   Comment Setup A for container management, seated in WC at sink   Oral Hygiene CARE Score 5   Grooming   Able To Initiate Tasks;Comb/Brush Hair;Brush/Clean Teeth;Wash/Dry Hands   Limitation Noted In Strength   Findings setup seated in WC at sink   Shower/Bathe Self   Type of Assistance Needed Physical assistance   Physical Assistance Level 26%-50%   Comment Seated on shower chair, pt able to wash LUE, chest/abdomen and BLE. TA for B feet, pt attempting figure 4 technique and reporting \"I feel like I'll fall out of this chair if I lean forward anymore.\" TA for buttocks in stance with RUE support of GB and Min A for standing balance.   Shower/Bathe Self CARE Score 3   Bathing   Assessed Bath Style Shower   Tub/Shower Transfer   Limitations Noted In Balance;Endurance;Neglect;Safety;UE Strength;LE Strength   Adaptive Equipment Grab Bars;Seat with Back   Assessed Shower   Findings Mod A SPT to L, Min A SPT to R   Upper Body Dressing   Type of Assistance Needed Physical assistance   Physical Assistance Level 25% or less   Comment max verbal cuing and encouragement for rae dressing technique. Pt donned t-shirt seated in WC with extended time A to complete LUE threading   Upper Body Dressing CARE Score 3   Lower Body Dressing   Type of Assistance Needed Physical assistance   Physical Assistance Level 51%-75%   Comment Seated in WC pt required Mod A to thread BLE, Max A to accelerate over hips in stance pt able to initiate acceleration over R hip with RUE   Lower Body Dressing CARE Score 2 " "  Putting On/Taking Off Footwear   Type of Assistance Needed Physical assistance   Physical Assistance Level 51%-75%   Comment TA to don B socks, pt encouraged to complete via figure 4 technique however reporting \"I fee; very unsteady and weak, I just can't\". TA to don B socks, setup for slip on shoes   Putting On/Taking Off Footwear CARE Score 2   Sit to Lying   Type of Assistance Needed Physical assistance   Physical Assistance Level 26%-50%   Comment A for BLE management   Sit to Lying CARE Score 3   Lying to Sitting on Side of Bed   Type of Assistance Needed Physical assistance   Physical Assistance Level 25% or less   Comment Extended time for BLE management, incidental A at trunk.   Lying to Sitting on Side of Bed CARE Score 3   Sit to Stand   Type of Assistance Needed Physical assistance   Physical Assistance Level 26%-50%   Comment no AD, with assist of therapist for force production and L knee block   Sit to Stand CARE Score 3   Bed-Chair Transfer   Type of Assistance Needed Physical assistance   Physical Assistance Level 26%-50%   Comment Mod A SPT to L, Min A SPT to R   Chair/Bed-to-Chair Transfer CARE Score 3   Toileting Hygiene   Type of Assistance Needed Physical assistance   Physical Assistance Level 51%-75%   Comment TA for cm, CS for hygiene seated post urination   Toileting Hygiene CARE Score 2   Toilet Transfer   Type of Assistance Needed Physical assistance   Physical Assistance Level 26%-50%   Comment Mod A SPT to L to BSC over toilet   Toilet Transfer CARE Score 3   Therapeutic Exercise - ROM   UE-ROM Yes   ROM- Right Upper Extremities   RUE ROM Comment Pt completed 3 sets x10 trials RUE strengthening with use of 2# seated in WC: sh flexion, elbow flexion/extension in 90* sh abduction.   ROM - Left Upper Extremities    LUE ROM Comment Pt tolerated PROM in all pivots/joints, reporting discomfort with end range elbow extension. pt educated on the importance of maintaining available ROM and AAROM " whenever possible, pt verbalized understanding.   Neuromuscular Education   Comments Seated at table top pt engaged in the following LUE AAROM 2 sets x10 trials: digit flexion/extension (trace extension), elbow flexion/extension in 90* shoulder abduction, sh flexion.   Cognition   Overall Cognitive Status Impaired   Arousal/Participation Alert;Cooperative   Attention Attends with cues to redirect   Orientation Level Oriented X4   Memory Decreased short term memory;Decreased recall of recent events   Following Commands Follows one step commands without difficulty   Activity Tolerance   Activity Tolerance Patient tolerated treatment well   Assessment   Treatment Assessment Pt participated in skilled OT session with focus on ADL retraining and LUE NMR. Pt tolerated treatment well, pt remained supine in bed per pt request with all immediate needs met, call bell accessible, bed alarm secured . Pt will continue to benefit from skilled OT services to ensure safe discharge. Continue plan of care with focus on bed mobility, LUE NMR, functional transfers, L inattention.   Prognosis Fair   Problem List Decreased strength;Decreased endurance;Impaired balance;Decreased mobility;Decreased cognition;Impaired judgement;Decreased safety awareness   Barriers to Discharge Inaccessible home environment;Decreased caregiver support   Plan   Treatment/Interventions ADL retraining;Functional transfer training;Therapeutic exercise;Endurance training;Cognitive reorientation;Patient/family training;Equipment eval/education;Bed mobility;Compensatory technique education;Continued evaluation   Progress Slow progress, decreased activity tolerance   Discharge Recommendation   Rehab Resource Intensity Level, OT   (pending)   OT Therapy Minutes   OT Time In 1000   OT Time Out 1130   OT Total Time (minutes) 90   OT Mode of treatment - Individual (minutes) 90   OT Mode of treatment - Concurrent (minutes) 0   OT Mode of treatment - Group (minutes) 0    OT Mode of treatment - Co-treat (minutes) 0   OT Mode of Treatment - Total time(minutes) 90 minutes   OT Cumulative Minutes 880   Therapy Time missed   Time missed? No

## 2025-05-28 NOTE — ASSESSMENT & PLAN NOTE
Diagnosed in 07/2024.  Hx of radiation in 08/2024 and 01/2025.  Hx of hold on treatments due to multiple admission/toxicities/PCP pneumonia/PE.  Most recently receiving Taoxtere with plans to start chemotherapy after admission.    MRI brain showed stable enhancing lesions within the L superior occipital lobe and L parietal lobe with surrounding vasogenic edema, stable foci of restricted diffusion within the R posterior frontal lobe most likely sequela of subacute ischemia.  CT CAP showed multiple lesions with increase in size.    Continue increased dose of dexamethasone 3mg BID.  Decreased to 2mg BID on 5/23 after discussion between Primary team and Oncology.  Continue Bactrim -160mg MWF.  Continue Keppra 1000mg BID.    Follows with Dr. Castro (Oncology), Dr Vuong (Radiation/Oncology), and Palliative as outpatient.  Follow-up with Neurology in 4-6 weeks as outpatient.    Noted to have worsening L sided weakness through the weekend.  CTH on 5/27 showed persistent asymmetric L parietal white matter lucency most compatible with vasogenic edema.  Discussed increasing steroids again but patient declines at this time.

## 2025-05-28 NOTE — TEAM CONFERENCE
Acute RehabilitationTeam Conference Note  Date: 5/28/25  Time: 1000am      Patient Name:  Ayla Nye       Medical Record Number: [id number]   YOB: 1950  Sex: Female          Room/Bed:  [facility] 262/[facility] 262-01  Payor Info:  Payor: MEDICARE / Plan: MEDICARE A AND B / Product Type: Medicare A & B Fee for Service /      Admitting Diagnosis: Metastasis to brain (HCC) [C79.31]  Lung cancer (HCC) [C34.90]   Admit Date/Time:  5/15/2025  3:23 PM  Admission Comments: No comment available     Primary Diagnosis:  Lung cancer metastatic to brain (HCC)  Principal Problem: Lung cancer metastatic to brain (HCC)    Patient Active Problem List    Diagnosis Date Noted    At risk for venous thromboembolism (VTE) 05/16/2025    Fall 05/15/2025    Impaired mobility and ADLs 05/15/2025    Vasogenic brain edema (HCC) 05/06/2025    Abnormal CT of the chest 05/06/2025    Stroke-like symptoms 05/05/2025    History of stroke 05/05/2025    NSVT (nonsustained ventricular tachycardia) (HCC) 04/23/2025    Left-sided weakness 04/21/2025    Right loin pain 04/01/2025    Complication of chemotherapy/immunotherapy 02/15/2025    GERD (gastroesophageal reflux disease) 02/14/2025    Epistaxis 02/03/2025    History of Pneumocystis jirovecii pneumonia 01/24/2025    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    Right kidney mass 01/02/2025    History of pulmonary embolism 12/31/2024    Dyspnea on exertion 12/31/2024    Imbalance 12/31/2024    Hyponatremia 11/02/2024    Leukocytosis 10/27/2024    Anemia 10/25/2024    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/23/2024    Palliative care patient 09/20/2024    Cancer associated pain 09/12/2024    Goals of care, counseling/discussion 09/11/2024    Right hip pain 09/10/2024    Altered mental status 09/09/2024    Hypothyroidism 09/09/2024    Abnormal imaging of central nervous system 09/09/2024    Acute metabolic encephalopathy 08/21/2024    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  (HCC) 07/31/2024    Brain mass 07/30/2024    Metastatic cancer to brain (HCC) 07/29/2024    Dehydration 04/30/2024    Moderate protein-calorie malnutrition (HCC) 04/29/2024    Bilateral leg pain 04/09/2024    Insomnia 04/09/2024    Metastatic adenocarcinoma (HCC) 03/29/2024    Age-related osteoporosis without current pathological fracture 11/13/2023    Encounter for monitoring of hydroxyurea therapy 05/03/2023    JAK2 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 05/03/2023    HTN (hypertension) 08/10/2022    Mixed hyperlipidemia 08/10/2022    Essential thrombocytosis (HCC) 07/02/2020    TB lung, 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    Psoriatic arthritis (HCC) 09/10/2019       Physical Therapy:    Weight Bearing Status: Full Weight Bearing  Transfers: Moderate Assistance  Bed Mobility: Moderate Assistance  Amulation Distance (ft): 25 feet  Ambulation:  (handrail)  Assistive Device for Ambulation: Other  Roller Walker Attachments:  (L hand scoop)  Number of Stairs: 4  Assistive Device for Stairs: Bilateral Hand Rails  Stair Assistance: Minimal Assistance  Ramp: Moderate Assistance  Assistive Device for Ramp: Hand Hold Assistance  Discharge Recommendations: Skilled Nursing Facility  [place] Home with:: First Floor Setup, Family Support, Home Physical Therapy (TBD pending needs and support at [place])    Pt is a 74 y.o. female admitted to rehab with impairment of mobility/safety/functional mobility s/p R MCA 2 weeks ago and admission to [facility] on 5/12 due to worsening L sided weakness and fall. During the admission, MRI showed slightly worsening R MCA infarct as well as progressive brain metastasis. 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 PTA she reports that she was independent in ADLs, transfers, and gait with HHA 5 hours a day/6 days a week to assist with stair negotiation and companionship  primarily. She does not drive, and relies on her ex- for transport to and from the grocery store. She lives in a 2 story home with 6 steps to enter with HR on R side, as well as 12 stairs inside the house with HR on R side. PTA she admits to going down the stairs on her butt due to fear of falling.     5/20/25  Pt making slow progress towards goals, lives alone in ML home, minimally improved L UE and LE hemiparesis. Continues to present with dec balance and wt shifting, dec midline correction. Progressed amb with use of L hand scoop. Pt has HHA however may benefit from inc support, given medical course and overall dx/prognosis. Need to discuss with family options and plan, availability of support at DC. Continue skilled PT intervention, goals set for 3 weeks, S level however pt lives alone. Suggest Reteam to maximize functional return and assess needs and support for DC.         5/27/25  Pt has declined in mobility , she has weaker LUE and LLE and more left lean.  She has declined to only transfers and walked at handrail due to concern of leg buckle. Performed stand pivot with Mod/ Max A x1 with std by assist of 2nd, or Mod A sit pivot transfers. Awaiting D/C to SNF.  She doesn't have anyone to help at home and wont reach independent level .      Occupational Therapy:  Eating: Supervision (s/u assist)  Grooming: Supervision  Bathing: Minimal Assistance  Bathing: Minimal Assistance  Upper Body Dressing: Supervision  Lower Body Dressing: Moderate Assistance  Toileting: Total Assistance  Tub/Shower Transfer: Minimal Assistance  Toilet Transfer: Maximum Assistance  Cognition: Exceptions to WNL  Cognition: Decreased Memory, Decreased Attention, Decreased Safety  Orientation: Person, Place, Time  Discharge Recommendations: Other (STR)       Pt is a 75yo female diagnosed with lung cancer in 07/2024, Hx of radiation in 08/2024 and 01/2025. PMH of lung adenocarcinoma with brain metastasis, hx of PE, prior stroke, HTN,  hypothyroidism, and HLD. Pt originally presented to [facility] on 5/12/25 due to a mechanical fall from ongoing left sided weakness, leading to head strike.  Patient had recently had a stroke with L sided weakness about 2 weeks prior and had been placed on steroids to assist with vasogenic edema. Pt's cancer treatments were placed on hold due to multiple admission/toxicities/PCP pneumonia/PE, with plans to start chemotherapy again at oncology f/u after discharge from [facility]. MRI brain showed stable enhancing lesions within the L superior occipital lobe and L parietal lobe with surrounding vasogenic edema, stable foci of restricted diffusion within the R posterior frontal lobe most likely sequela of subacute ischemia. CT CAP showed multiple lesions with increase in size. Pt lives at home alone, but does have a HHA for five hours/day for 6 days a week.  States that she can increase time for the home health aid but she wants to focus getting on stronger first.  Home health aid helps with managing her medications for her.  Has 2 daughters in the area that also come to help her at times. PLOF was ambulation with nothing in the house, but admits to furniture walking; using no AD to go up the steps but comes down on her bottom due to fear of falling recently. Prior to CVA Pt was independent with selfcare, however has needed increased assistance on left side since. Pt presents with impairments in visual and physical inattention to left side, left hemiparesis, balance, endurance, safety awareness, problem solving and memory which are significantly limiting her ability to safely and independently perform her daily functional routines. In addition her home set-up with no bathroom or bed on the first floor is a safety concern for discharge. Chemotherapy is for palliative measures however the patient and family have not been ready to discuss hospice services at this time. Pt would benefit from skilled OT interventions to  maximize functional return in LUE, improve balance, train in compensatory and adaptive strategies, assist with discharge planning. An early family meeting to discuss long term plans would be beneficial to help guide treatment and goals. At this time goals are being set for 24hr supervision in 2-3wks. OT spoke with physician about Pt's difficulty tolerating full 90min sessions today with need to sleep between. He is in agreement to order 900min/7days for Pt given her current diagnosis and limited endurance.    5/27/25:   ADL Status:   Grooming: supervision seated, Min A with VC's in stance Bathing: mod   UB Dressing: mod  LB Dressing: mod  Toileting:   min Toilet Transfers: min  Goals: supervision  Meeting Goals:  making gradual gains; pt noted since 5/25-5/26 to have increased fatigue and increased need for assistance with all functional transfers/functional tasks with need for Ax2 for toileting and functional transfers.   Modify Goals:   not at this time- pt to be d/c to STR; potential medication adjustments to be made per stand-up notes on 05/27/2025;    Barriers Interventions   Left hemiparesis UE>LE NMR, NPP, TE, weight bearing tasks   Inattention to left hemibody and visually Visual training and attention tasks   balance Static and dynamic standing tasks   Insight/acceptance of CLOF Discussion of home environment and safety risks                 Family Training: family have observed therapy sessions but not participated hands-on; family discussed STR;   Anticipated D/C Date: not yet determined- per records STR awaiting placement, estimated 2-3wk ELOS  D/C Plan/Location:  STR  D/C Therapy Recommendations: 24 hour Assist  DME: STEPHANIE      Speech Therapy:  Mode of Communication: Verbal  Cognition: Exceptions to WNL  Cognition: Decreased Memory, Decreased Executive Functions, Decreased Attention, Decreased Comprehension  Orientation: Person, Place, Time, Situation  Discharge Recommendations: Home with:  DC Home with::  24 Hour Supervision, Family Support (ST services pending progress)  Pt currently being followed for cognitive linguistic skills in which currently pt is making steady progress. Upon admission to the unit, pt did complete formalized cognitive assessment, the CLQT+ on initial evaluation with a Composite Severity Rating score of 3.4 out of 4.0, correlating to overall mildly impaired cognitive linguistic impairments at time of evaluation and in comparison to age matched peers ranging from 70-90 y/o. Cognitive barriers which present at this time include: decreased attention, visual attention, ST memory recall , problem solving, reasoning, sequencing, organization of thoughts, judgement, and slower processing. In order to address the noted barriers, skilled SLP services will address this by targeting the following interventions: verbal problem solving task, verbal working memory tasks, visual memory recall tasks, drawing conclusions activities, written sequencing tasks, verbal sequencing tasks, categorization tasks , written financial management tasks, verbal review of current medications, written review of medications, written calendar tasks, tangible scheduling tasks, functional reading tasks, and family education/training. Of note pt does report intermittent word finding deficits in which word deduction and word retrieval strategies are being reviewed.    Current level of functioning:   Comprehension: supervision  Expression: supervision  Social Interaction: mod I  Executive functions: min-mod A   Memory: min-mod A    Family training/education has been initiated with pt's family (2 dtrs Juliana and Sue as well as ex- Sandro) about role of services and targeting tasks which are not only basic review but ensuring consistency in ability to complete I ADL's as pt reported. It is suspected that pt would likely require some level of supervision in regard to I ADL's at discharge to ensure thoroughness and accuracy given  finances, scheduling and medications. Recommendations at time of discharge are for the potential for continued ST services but pending disposition plan and if pt would have increase support/supervision at home.    This week, focus for continued services to target functional executive function skills, including problem solving, reasoning, judgement, sequencing, but also targeting ST memory strategies, as well as I ADL review and initiation of word deduction/retrieval tasks. At this time, pt will continue to benefit from skilled cognitive linguistic tx session to maximize overall functional independence given overall cognitive linguistic skills in attempts to decreased caregiver burden over time.     Nursing Notes:  Appetite: Good  Diet Type: Regular/House, Thin Liquids                                                                     Pain Location/Orientation: Orientation: Lower, Location: Back  Pain Score: 6  Pain 2  Pain Score 2: 4  Pain Location/Orientation 2: Orientation: Left, Location: Shoulder                    Hospital Pain Intervention(s): Medication (See MAR), Heat applied, Repositioned          Ayla Nye is a 74 y.o. female with a PMH of lung adenocarcinoma with brain metastasis, hx of PE, prior stroke, HTN, hypothyroidism, and HLD who originally presented to [facility] on 5/12/25 due to a mechanical fall from ongoing left sided weakness, leading to head strike.  Patient had recently had a stroke with L sided weakness about 2 weeks prior and had been placed on steroids to assist with vasogenic edema.  CTH showed vasogenic edema L parietal vertex compatible with known brain metastasis, no hemorrhage identified.  Neurology was consulted and recommended obtaining MRI brain.  MRI brain showed stable enhancing lesions within the L superior occipital lobe and L parietal lobe with surrounding vasogenic edema and stable foci of restricted diffusion within the R posterior frontal lobe, most  likely sequela of subacute ischemia.  Neurology felt that symptoms were likely due to some increase in size of CNS metastasis on top of noted vasogenic edema.  Oncology was consulted and recommended increasing dexamethasone from 2mg to 3mg BID.  Recommending to hold chemotherapy until outpatient follow-up.  Will require follow-up with Neurovascular in 4-6 weeks.  Evaluated by PT/OT and recommended for acute inpatient rehabilitation.      Admitted to [facility] on 5/15/2025; we are consulted for medical clearance.     Bowel will be maintained with Senna 8.6 mg daily. Pain will be managed with Tylenol 975 mg every 6 hours, Voltaren gel 2 grams 4 times daily to affected area, Neurontin capsule 200 mg daily, and 300 mg daily at bedtime, Lidoderm 5% patch daily, Robaxin 250 mg every 6 hours PRN. DVT prophylaxis of choice is as follows: ASA 81 mg daily, Lovenox SQ inj 40 mg every 24 hours. Insomnia will be managed with melatonin 3 mg tablet daily at bedtime, and good sleep hygiene. Hypertension will be managed with continuing home amlodipine 10mg daily and monitoring BP with routine VS. Hypothyroidism will be managed with home levothyroxine 50mcg daily, recommend to repeat TSH in 6 weeks as outpatient. Anemia will be managed with cyanocobalamin 1000mcg daily, currently asymptomatic, will continue to trend routine CBC. Leukocytosis-no active s/s of infection, week of 5/15 WBC lab results are likely steroid induced, continue to trend routine CBC. GERD will be managed with Protonix 40 mg daily before breakfast. Stroke like symptoms will be managed with neurovascular checks Q shift, Follow-up with Neurology in 4-6 weeks as outpatient, continue with Dexamethasone 3 mg 2 times daily. Stage IV adenocarcinoma of lung metastatic to brain managed with dexamethasone 3mg BID, Bactrim -160mg MWF, Keppra 1000mg BID. Stroke like symptoms will be managed with dexamethasone 3mg BID.       This week we will continue to  monitor vital signs and lab values. We will manage pain with the above orders so the patient can participate in her therapy sessions to her optimal abilities. We will teach the patient how to conserve energy so that she may perform ADL's independently as much as she can. We will educate the patient on the importance of repositioning and off-loading pressure to help lower the risk of skin breakdown. We will prevent falls by keeping personal items and call bell within reach, we will also initiate and maintain hourly rounding      Case Management:     Discharge Planning  Living Arrangements: Lives Alone  Support Systems: Daughter, Home care staff  Assistance Needed: To be determined  Type of Current Residence: Private residence  Current Home Care Services: Yes  Type of Current Home Care Services: Home health aide  5/28 Family meeting held this past week. Family unable to provide the necessary assistance of supervision upon discharge. Plan to pursue subacute rehab with potential need for long term care placement. Patient had recent decline over weekend with increased weakness, MD making medication changes and will monitor prior to discharge to subacute. Family made aware of plan. Family will chose SNF when d/c date is confirmed. CM will assist in setting up transport and reserving facilities when time comes.       5/21 Patient reports she lives alone in 2SH with 6STE with railings and full flight of stairs to second floor. Bedrooms and full bath with tub shower on the second floor. Patient  independent PTA. Patient has a cane, walker ,elevated toilet seat with rails and shower chair. Patient has no prior outpt therapy, STR experience, is currently open to [facility] for RN/PT/OT. Patient has prescription coverage and gets medications from [facility] [address] and [facility] in [place]. CM confirmed patient's address, emergency contact and insurance information. Patient has 2 supportive daughters who live an hour away,  supportive ex , and sister in law who can offer assistance upon discharge.Patient also has a home health aide 7 hours/day , her name is Kavya, and is paid privately. CM will follow for discharge needs.          Is the patient actively participating in therapies? yes  List any modifications to the treatment plan: Patient with increased weakness this week since steroid taper, medication adjustments made.     Barriers Interventions   endurance Energy conservation techniques, task simplification, graded TA   Left hemiparesis Neuromuscular re-ed, neuroplasticity principles    Decreased balance/Left lateral lean assist x 2,    inattention Bimanual tasks, sustained attention tasks,    Stage IV metastatic CA with brain mets Oncology following outpatient, steroid decreased; repeat CT head   CVA with weakness    Blood pressure Vital monitor, medication management, holding meds for low BP's   Lives alone Plan for subacute rehab     Is the patient making expected progress toward goals? No, Patient has had some increased weakness since steroid has been tapered. Pt requested not to go back up on steroid so other medication adjustments were made. Will hold off on d/c to subacute at this time to monitor patient's changes to medication adjustment  List any update or changes to goals: Goals remain at partial assistance at this time.     Medical Goals: Patient will be able to manage medical conditions and comorbid conditions with medications and follow up upon completion of rehab program    Weekly Team Goals:   Rehab Team Goals  ADL Team Goal: Patient will require supervision with ADLs with least restrictive device upon completion of rehab program  Bowel/Bladder Team Goal: Patient will return to premorbid level for bladder/bowel management upon completion of rehab program  Cognitive Team Goal: Patient will be independent for basic  tasks and require supervision for complex tasks upon completion of rehab program    Discussion:  Patient was making steady progress with therapies however this week at  trialed tapering steroid. Pt with increased weakness however does not want to increase steroid again. Other medication adjustments were made. Remeron and seroquil was started decreased overnight. CT head was completed with  no changes. Was ambulating last week up to 90 feet now completing transfers AX2 with decline in functional mobility. Will continue to monitor patient's functional response to adjustment. Patient plan to discharge to subacute once medically stable. Daughter is aware of plan. Patient functioning at total A for toileting hygiene, mod A for LB dressing, min A for bathing, and total A for transfers.     Anticipated Discharge Date:  To subacute when medically stable  [facility] Team Members Present:  The following team members are supervising care for this patient and were present during this Weekly Team Conference.    Physician: Dr. Domenico MD  : Heather Kuhn MS OTR/L  Registered Nurse: Trista Meyers RN  Physical Therapist: Ela Sheikh, PT  Occupational Therapist: Heather Kuhn MS, OTR/L  Speech Therapist: Rina Lawrence MA, CCC-SLP

## 2025-05-28 NOTE — PROGRESS NOTES
Progress Note - Internal Medicine   Name: Ayla Nye 74 y.o. 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  Hx of radiation in 08/2024 and 01/2025.  Hx of hold on treatments due to multiple admission/toxicities/PCP pneumonia/PE.  Most recently receiving Taoxtere with plans to start chemotherapy after admission.    MRI brain showed stable enhancing lesions within the L superior occipital lobe and L parietal lobe with surrounding vasogenic edema, stable foci of restricted diffusion within the R posterior frontal lobe most likely sequela of subacute ischemia.  CT CAP showed multiple lesions with increase in size.    Continue increased dose of dexamethasone 3mg BID.  Decreased to 2mg BID on 5/23 after discussion between Primary team and Oncology.  Continue Bactrim -160mg MWF.  Continue Keppra 1000mg BID.    Follows with Dr. Castro (Oncology), Dr Vuong (Radiation/Oncology), and Palliative as outpatient.  Follow-up with Neurology in 4-6 weeks as outpatient.    Noted to have worsening L sided weakness through the weekend.  CTH on 5/27 showed persistent asymmetric L parietal white matter lucency most compatible with vasogenic edema.  Discussed increasing steroids again but patient declines at this time.  HTN (hypertension)  Home regimen: amlodipine 5mg daily.  Continue amlodipine 10mg daily and lisinopril 5mg daily.  Discontinue lisinopril 5mg daily today.  Monitor BP with routine VS.  Insomnia  Continue Remeron 7.5mg at HS and Seroquel 25mg at HS.  Hypothyroidism  Continue home levothyroxine 50mcg daily.  TSH 0.408 and T4 0.79 on 5/12.  Recommend repeat TSH in 6 weeks as outpatient.  Anemia  Hgb currently stable at 9.7  Baseline 9-10.  Continue home cyanocobalamin 1000mcg daily.  Asymptomatic.  Continue to trend routine  CBC.  Leukocytosis  WBC count currently stable at 12.23.  No active s/s of infection.  Likely steroid induced.  Continue to trend routine CBC.  History of pulmonary embolism  Most recent CTA chest/PE study in 03/2025 showed no PE.  Had been on Xarelto in the past but discontinued due to new foci of hemorrhage within the L occipital metastasis.  Monitor for s/s of reoccurrence.  GERD (gastroesophageal reflux disease)  Continue home Protonix 40mg daily.  Stroke-like symptoms  Presented on 5/12/25 after experiencing mechanical fall with head strike.  Hx of L sided weakness s/p stroke in 04/2025.  CTH showed vasogenic edema L parietal vertex compatible with known brain metastasis, no hemorrhage identified.  MRI brain showed stable enhancing lesions within the L superior occipital lobe and L parietal lobe with surrounding vasogenic edema, stable foci of restricted diffusion within the R posterior frontal lobe most likely sequela of subacute ischemia.    Per Neurology - symptoms likely due to some increase in size of CNS metastasis on top of noted vasogenic edema.  Home dexamethasone increased from 2mg BID to 3mg BID.  Dose decreased back to 2mg BID on 5/23.    Neurovascular checks Q shift.  Follow-up with Neurology in 4-6 weeks as outpatient.    Primary team following.  PT/OT/SLP.  History of stroke  R frontal subacute infarct in 04/2025.  Continue ASA 81mg daily and Lipitor 40mg daily.  Continue with PT/OT.  Follow-up with Neurovascular in 4-6 weeks as outpatient.    VTE Pharmacologic Prophylaxis:   Pharmacologic: Enoxaparin (Lovenox)  Mechanical VTE Prophylaxis in Place: Yes - sequential compression devices.    Current Length of Stay: 13 day(s)    Current Patient Status: Inpatient Rehab     Discharge Plan: As per primary team.    Code Status: Level 3 - DNAR and DNI    Subjective:   Pt examined while pt sitting in WC in pt room.  Slept very well last night.  Still has L sided weakness, worse than last week.  Discussed  CT results and considering going back up on the steroids but patient is still hesitant and would like to avoid increasing steroids.  Noted to have lower BP this morning - denies any symptoms.  Will discontinue lisinopril today.  Denies any fevers, chills, SOB, palpitations, or CP.  Participating well with therapy this morning.    Objective:     Vitals:   Temp (24hrs), Av.4 °F (36.9 °C), Min:97.4 °F (36.3 °C), Max:98.8 °F (37.1 °C)    Temp:  [97.4 °F (36.3 °C)-98.8 °F (37.1 °C)] 98.7 °F (37.1 °C)  HR:  [80-84] 80  Resp:  [18] 18  BP: (104-140)/(65-70) 104/66  SpO2:  [92 %-96 %] 92 %  Body mass index is 21.45 kg/m².     Review of Systems   Constitutional:  Negative for appetite change, chills, fatigue and fever.   HENT:  Negative for trouble swallowing.    Eyes:  Negative for visual disturbance.   Respiratory:  Negative for cough, shortness of breath, wheezing and stridor.    Cardiovascular:  Negative for chest pain, palpitations and leg swelling.   Gastrointestinal:  Negative for abdominal distention, abdominal pain, constipation, diarrhea, nausea and vomiting.        LBM    Genitourinary:  Negative for difficulty urinating.   Musculoskeletal:  Positive for gait problem. Negative for arthralgias and back pain.   Neurological:  Positive for weakness (L sided weakness, worse compared to last week). Negative for dizziness, light-headedness and headaches.   Psychiatric/Behavioral:  Negative for dysphoric mood and sleep disturbance. The patient is not nervous/anxious.    All other systems reviewed and are negative.       Input and Output Summary (last 24 hours):       Intake/Output Summary (Last 24 hours) at 2025 1116  Last data filed at 2025 0833  Gross per 24 hour   Intake 240 ml   Output 200 ml   Net 40 ml       Physical Exam:     Physical Exam  Vitals and nursing note reviewed.   Constitutional:       General: She is not in acute distress.     Appearance: Normal appearance. She is not ill-appearing.    HENT:      Head: Normocephalic and atraumatic.     Cardiovascular:      Rate and Rhythm: Normal rate and regular rhythm.      Pulses: Normal pulses.      Heart sounds: Murmur heard.      Systolic murmur is present with a grade of 2/6.      No friction rub.   Pulmonary:      Effort: Pulmonary effort is normal. No respiratory distress.      Breath sounds: Examination of the right-lower field reveals decreased breath sounds. Examination of the left-lower field reveals decreased breath sounds. Decreased breath sounds present. No wheezing or rhonchi.   Abdominal:      General: Abdomen is flat. Bowel sounds are normal. There is no distension.      Palpations: Abdomen is soft. There is no mass.      Tenderness: There is no abdominal tenderness. There is no guarding or rebound.      Hernia: No hernia is present.     Musculoskeletal:      Cervical back: Normal range of motion and neck supple. No tenderness.      Right lower leg: No edema.      Left lower leg: No edema.     Skin:     General: Skin is warm and dry.      Capillary Refill: Capillary refill takes less than 2 seconds.     Neurological:      Mental Status: She is alert and oriented to person, place, and time.      Motor: Weakness (LUE strength 3/5, L hand  absent) present.     Psychiatric:         Mood and Affect: Mood normal.         Behavior: Behavior normal.         Additional Data:     Labs:    Results from last 7 days   Lab Units 05/27/25  0533 05/22/25  0538   WBC Thousand/uL 12.23* 14.62*   HEMOGLOBIN g/dL 9.7* 9.3*   HEMATOCRIT % 33.5* 31.5*   PLATELETS Thousands/uL 278 289   BANDS PCT %  --  2   LYMPHO PCT % 5* 3*   MONO PCT % 2* 5   EOS PCT % 0 0     Results from last 7 days   Lab Units 05/27/25  0533   SODIUM mmol/L 138   POTASSIUM mmol/L 4.0   CHLORIDE mmol/L 103   CO2 mmol/L 26   BUN mg/dL 29*   CREATININE mg/dL 0.89   ANION GAP mmol/L 9   CALCIUM mg/dL 8.7   GLUCOSE RANDOM mg/dL 70                       Labs reviewed    Imaging:    Imaging  reviewed    Recent Cultures (last 7 days):           Last 24 Hours Medication List:   Current Facility-Administered Medications   Medication Dose Route Frequency Provider Last Rate    acetaminophen  975 mg Oral Daily Cristian Dias MD      And    acetaminophen  650 mg Oral Daily Cristian Dias MD      And    acetaminophen  975 mg Oral Daily Cristian Dias MD      And    acetaminophen  650 mg Oral Daily Cristian Dias MD      aluminum-magnesium hydroxide-simethicone  30 mL Oral Q4H PRN Ata Cunningham MD      amLODIPine  10 mg Oral Daily ZULEYMA Viramontes      aspirin  81 mg Oral Daily Claudine Leos PA-C      atorvastatin  40 mg Oral Daily With Dinner Claudine Leos PA-C      vitamin B-12  1,000 mcg Oral Daily Claudine Leos PA-C      dexamethasone  2 mg Oral BID Claudine Leos PA-C      Diclofenac Sodium  2 g Topical 4x Daily Claudine Leos PA-C      enoxaparin  40 mg Subcutaneous Q24H Claudine Leos PA-C      gabapentin  200 mg Oral Daily Claudine Leos PA-C      And    gabapentin  400 mg Oral HS Claudine Leos PA-C      HYDROmorphone  2 mg Oral Q4H PRN Nori Goodwin MD      levETIRAcetam  1,000 mg Oral BID Claudine Leos PA-C      levothyroxine  50 mcg Oral Early Morning Claudine Leos PA-C      lidocaine  1 patch Topical Daily Claudine Leos PA-C      methocarbamol  250 mg Oral Q6H PRN Claudine Leos PA-C      mirtazapine  7.5 mg Oral HS Cristian Dias MD      naloxone  2 mg Nasal Daily PRN Nori Goodwin MD      pantoprazole  40 mg Oral Daily Before Breakfast Claudine Leos PA-C      polyethylene glycol  17 g Oral Daily PRN Nori Goodwin MD      QUEtiapine  12.5 mg Oral Daily PRN Ciaran Acuña MD      QUEtiapine  25 mg Oral HS Ciaran Acuña MD      senna  2 tablet Oral Daily Nori Goodwin MD      sulfamethoxazole-trimethoprim  1 tablet Oral Once per day on Monday Wednesday Friday Claudine Leos PA-C          M*Modal software was used to dictate this note.  It may  contain errors with dictating incorrect words or incorrect spelling. Please contact the provider directly with any questions.

## 2025-05-28 NOTE — ASSESSMENT & PLAN NOTE
Continue melatonin 6 mg qHS (increased 5/22) Seroquel 37.5 mg qHs (last increased 5/22), nighttime dose of gabapentin increased on 5/20  Remeron added 5/23 5/23: reached out to on-call oncology, Decadron decreased to 2 mg BID   Decadron dosing modified so second dose is given earlier   Encourage sleep hygiene (routine that promotes healthy circadian rhythm,avoid caffeine later in the day, quiet/dark room at night, reduce electronic before bedtime)  Continued improvement in insomnia

## 2025-05-29 PROBLEM — R31.9 HEMATURIA: Status: ACTIVE | Noted: 2025-05-29

## 2025-05-29 LAB
ANION GAP SERPL CALCULATED.3IONS-SCNC: 8 MMOL/L (ref 4–13)
ANISOCYTOSIS BLD QL SMEAR: PRESENT
BASOPHILS # BLD MANUAL: 0 THOUSAND/UL (ref 0–0.1)
BASOPHILS NFR MAR MANUAL: 0 % (ref 0–1)
BUN SERPL-MCNC: 28 MG/DL (ref 5–25)
CALCIUM SERPL-MCNC: 8.9 MG/DL (ref 8.4–10.2)
CHLORIDE SERPL-SCNC: 105 MMOL/L (ref 96–108)
CO2 SERPL-SCNC: 26 MMOL/L (ref 21–32)
CREAT SERPL-MCNC: 0.83 MG/DL (ref 0.6–1.3)
DACRYOCYTES BLD QL SMEAR: PRESENT
EOSINOPHIL # BLD MANUAL: 0 THOUSAND/UL (ref 0–0.4)
EOSINOPHIL NFR BLD MANUAL: 0 % (ref 0–6)
ERYTHROCYTE [DISTWIDTH] IN BLOOD BY AUTOMATED COUNT: 16.8 % (ref 11.6–15.1)
GFR SERPL CREATININE-BSD FRML MDRD: 69 ML/MIN/1.73SQ M
GLUCOSE P FAST SERPL-MCNC: 79 MG/DL (ref 65–99)
GLUCOSE SERPL-MCNC: 79 MG/DL (ref 65–140)
HCT VFR BLD AUTO: 31.7 % (ref 34.8–46.1)
HGB BLD-MCNC: 9.4 G/DL (ref 11.5–15.4)
LG PLATELETS BLD QL SMEAR: PRESENT
LYMPHOCYTES # BLD AUTO: 0.65 THOUSAND/UL (ref 0.6–4.47)
LYMPHOCYTES # BLD AUTO: 3 % (ref 14–44)
MCH RBC QN AUTO: 27.4 PG (ref 26.8–34.3)
MCHC RBC AUTO-ENTMCNC: 29.7 G/DL (ref 31.4–37.4)
MCV RBC AUTO: 92 FL (ref 82–98)
METAMYELOCYTE ABSOLUTE CT: 0.26 THOUSAND/UL (ref 0–0.1)
METAMYELOCYTES NFR BLD MANUAL: 2 % (ref 0–1)
MONOCYTES # BLD AUTO: 0.39 THOUSAND/UL (ref 0–1.22)
MONOCYTES NFR BLD: 3 % (ref 4–12)
MYELOCYTE ABSOLUTE CT: 0.65 THOUSAND/UL (ref 0–0.1)
MYELOCYTES NFR BLD MANUAL: 5 % (ref 0–1)
NEUTROPHILS # BLD MANUAL: 10.95 THOUSAND/UL (ref 1.85–7.62)
NEUTS BAND NFR BLD MANUAL: 1 % (ref 0–8)
NEUTS SEG NFR BLD AUTO: 83 % (ref 43–75)
PLATELET # BLD AUTO: 271 THOUSANDS/UL (ref 149–390)
PLATELET BLD QL SMEAR: ADEQUATE
PMV BLD AUTO: 9.4 FL (ref 8.9–12.7)
POIKILOCYTOSIS BLD QL SMEAR: PRESENT
POLYCHROMASIA BLD QL SMEAR: PRESENT
POTASSIUM SERPL-SCNC: 4.6 MMOL/L (ref 3.5–5.3)
PROMYELOCYTE ABSOLUTE CT: 0.13 THOUSAND/UL (ref 0–0)
PROMYELOCYTES NFR BLD MANUAL: 1 % (ref 0–0)
RBC # BLD AUTO: 3.43 MILLION/UL (ref 3.81–5.12)
RBC MORPH BLD: PRESENT
SODIUM SERPL-SCNC: 139 MMOL/L (ref 135–147)
VARIANT LYMPHS # BLD AUTO: 2 %
WBC # BLD AUTO: 13.03 THOUSAND/UL (ref 4.31–10.16)

## 2025-05-29 PROCEDURE — 80048 BASIC METABOLIC PNL TOTAL CA: CPT | Performed by: NURSE PRACTITIONER

## 2025-05-29 PROCEDURE — 97129 THER IVNTJ 1ST 15 MIN: CPT

## 2025-05-29 PROCEDURE — 97530 THERAPEUTIC ACTIVITIES: CPT

## 2025-05-29 PROCEDURE — 97116 GAIT TRAINING THERAPY: CPT

## 2025-05-29 PROCEDURE — 97130 THER IVNTJ EA ADDL 15 MIN: CPT

## 2025-05-29 PROCEDURE — 85027 COMPLETE CBC AUTOMATED: CPT | Performed by: NURSE PRACTITIONER

## 2025-05-29 PROCEDURE — 97110 THERAPEUTIC EXERCISES: CPT

## 2025-05-29 PROCEDURE — 85007 BL SMEAR W/DIFF WBC COUNT: CPT | Performed by: NURSE PRACTITIONER

## 2025-05-29 PROCEDURE — 97112 NEUROMUSCULAR REEDUCATION: CPT

## 2025-05-29 PROCEDURE — 99232 SBSQ HOSP IP/OBS MODERATE 35: CPT | Performed by: INTERNAL MEDICINE

## 2025-05-29 PROCEDURE — 99232 SBSQ HOSP IP/OBS MODERATE 35: CPT

## 2025-05-29 RX ORDER — DEXAMETHASONE 0.5 MG/1
3 TABLET ORAL DAILY
Status: DISCONTINUED | OUTPATIENT
Start: 2025-05-30 | End: 2025-05-29

## 2025-05-29 RX ORDER — DEXAMETHASONE 0.5 MG/1
2 TABLET ORAL
Status: DISCONTINUED | OUTPATIENT
Start: 2025-05-29 | End: 2025-05-29

## 2025-05-29 RX ORDER — DEXAMETHASONE 0.5 MG/1
3 TABLET ORAL DAILY
Status: DISCONTINUED | OUTPATIENT
Start: 2025-05-30 | End: 2025-05-30 | Stop reason: HOSPADM

## 2025-05-29 RX ORDER — DEXAMETHASONE 0.5 MG/1
1 TABLET ORAL ONCE
Status: COMPLETED | OUTPATIENT
Start: 2025-05-29 | End: 2025-05-29

## 2025-05-29 RX ORDER — DEXAMETHASONE 4 MG/1
2 TABLET ORAL DAILY
Status: DISCONTINUED | OUTPATIENT
Start: 2025-05-29 | End: 2025-05-30 | Stop reason: HOSPADM

## 2025-05-29 RX ADMIN — GABAPENTIN 200 MG: 100 CAPSULE ORAL at 08:06

## 2025-05-29 RX ADMIN — ACETAMINOPHEN 975 MG: 325 TABLET, FILM COATED ORAL at 17:10

## 2025-05-29 RX ADMIN — ALUMINUM HYDROXIDE, MAGNESIUM HYDROXIDE, AND DIMETHICONE 30 ML: 200; 20; 200 SUSPENSION ORAL at 19:29

## 2025-05-29 RX ADMIN — PANTOPRAZOLE SODIUM 40 MG: 40 TABLET, DELAYED RELEASE ORAL at 06:01

## 2025-05-29 RX ADMIN — DEXAMETHASONE 1 MG: 0.5 TABLET ORAL at 11:13

## 2025-05-29 RX ADMIN — HYDROMORPHONE HYDROCHLORIDE 2 MG: 2 TABLET ORAL at 05:33

## 2025-05-29 RX ADMIN — ACETAMINOPHEN 650 MG: 325 TABLET, FILM COATED ORAL at 23:20

## 2025-05-29 RX ADMIN — ACETAMINOPHEN 975 MG: 325 TABLET, FILM COATED ORAL at 05:32

## 2025-05-29 RX ADMIN — DICLOFENAC SODIUM 2 G: 10 GEL TOPICAL at 11:16

## 2025-05-29 RX ADMIN — HYDROMORPHONE HYDROCHLORIDE 2 MG: 2 TABLET ORAL at 21:02

## 2025-05-29 RX ADMIN — MIRTAZAPINE 7.5 MG: 7.5 TABLET, FILM COATED ORAL at 19:29

## 2025-05-29 RX ADMIN — DICLOFENAC SODIUM 2 G: 10 GEL TOPICAL at 17:11

## 2025-05-29 RX ADMIN — QUETIAPINE FUMARATE 25 MG: 25 TABLET ORAL at 21:02

## 2025-05-29 RX ADMIN — DICLOFENAC SODIUM 2 G: 10 GEL TOPICAL at 08:07

## 2025-05-29 RX ADMIN — LIDOCAINE 5% 1 PATCH: 700 PATCH TOPICAL at 08:06

## 2025-05-29 RX ADMIN — ASPIRIN 81 MG CHEWABLE TABLET 81 MG: 81 TABLET CHEWABLE at 08:06

## 2025-05-29 RX ADMIN — AMLODIPINE BESYLATE 10 MG: 10 TABLET ORAL at 08:06

## 2025-05-29 RX ADMIN — GABAPENTIN 400 MG: 400 CAPSULE ORAL at 21:02

## 2025-05-29 RX ADMIN — ACETAMINOPHEN 650 MG: 325 TABLET, FILM COATED ORAL at 11:13

## 2025-05-29 RX ADMIN — ATORVASTATIN CALCIUM 40 MG: 40 TABLET, FILM COATED ORAL at 17:11

## 2025-05-29 RX ADMIN — ENOXAPARIN SODIUM 40 MG: 40 INJECTION SUBCUTANEOUS at 14:17

## 2025-05-29 RX ADMIN — DEXAMETHASONE 2 MG: 4 TABLET ORAL at 14:17

## 2025-05-29 RX ADMIN — LEVETIRACETAM 1000 MG: 500 TABLET, FILM COATED ORAL at 21:02

## 2025-05-29 RX ADMIN — CYANOCOBALAMIN TAB 1000 MCG 1000 MCG: 1000 TAB at 08:06

## 2025-05-29 RX ADMIN — LEVETIRACETAM 1000 MG: 500 TABLET, FILM COATED ORAL at 08:06

## 2025-05-29 RX ADMIN — LEVOTHYROXINE SODIUM 50 MCG: 0.05 TABLET ORAL at 05:32

## 2025-05-29 RX ADMIN — DEXAMETHASONE 2 MG: 4 TABLET ORAL at 06:01

## 2025-05-29 NOTE — ASSESSMENT & PLAN NOTE
Home regimen: amlodipine 5mg daily.  Continue amlodipine 10mg daily.  Discontinued lisinopril 5mg 5/28.  Monitor BP with routine VS.

## 2025-05-29 NOTE — DISCHARGE INSTR - APPOINTMENTS
Discharging to   [facility]   [address] [address]  Phone: [phone]

## 2025-05-29 NOTE — DISCHARGE INSTR - AVS FIRST PAGE
DISCHARGE INSTRUCTIONS: [facility] ACUTE REHABILITATION [place]    Bring these instructions with you to your Skilled Nursing Facility Provider so they can order and follow-up any additional lab work or imaging recommended at time of discharge.    Resume follow-up with all your prior providers that you have established care prior to this hospitalization.  Discuss with primary care physician (PCP) if you have additional questions.     It  is you or your caregivers responsibility to obtain follow-up MEDICATION REFILLS  As indicated through your Primary Care Physician (PCP) and other outpatient specialty provider(s) after discharge.  Please follow-up with your PCP as soon as possible after discharge to set-up follow-up management and when appropriate refills.      You remain a fall and injury risk which could be severe.  - Your risk of fall has decreased however since admission to acute rehab.    - Appropriate supervision +/- assistance as instructed during your rehab course is recommended to decrease risk of fall and injury.      If you (or your health care proxy) have any questions or concerns regarding your acute rehabilitation stay including issues with medications, rehabilitation, and follow-up plan, please call:          [facility] [place] at [phone]    Should you develop fevers, chills, new weakness, changes in sensation, difficulty speaking, facial weakness, confusion, shortness of breath, chest pain, or other concerning symptoms please call 911 and/or obtain transportation to nearest ER immediately.        PHYSICIANS to see: PCP, neurology,palliative, radiation oncology, oncology, urology   Please see your doctors listed in the follow up providers section of your discharge paperwork, and take the discharge paperwork with you to your appointments.    SKIN CARE INSTRUCTIONS to follow:  Monitor skin for increased redness or breadown and promptly notify  your physician should these develop    If instructed while in [facility] - be sure you stand +/- walk every 1-2 hours and if advised use appropriate supervision/assistance to optimally offload your buttock/sacral region.  While seated or lying in bed shift positions and from side to side often.  Can use barrier type cream such as Hydragaurd 2 times per day and as needed.    Turn patient (yourself) fully every 2 hours while in bed.        Due to the following you are at increased risk of skin breakdown/wounds/worsening wounds:  - Impaired mobility  - Impaired nutrition/intake/low albumin  - Medical co-morbidities    Buttocks/Sacrum  Turn as full as possible off sacrum/buttocks every 2 hours  Use Cushion while in chair or wheelchair  Weight shift every 10-15 minutes while in chair  Keep skin clean and dry as possible.  Remove wet or soiled clothing/linens promptly  May use barrier cream or similar 2 times per day or as needed   Monitor skin for increased breakdown which you are at risk of and notify nursing, PCP, or other physician providers should this occur right away    Heels/bony edges of feet  Float heels off edge of pillow for added pressure relief.  Monitor heels and bony protuberances and notify physician or nursing for any increased redness, bogginess, or breakdown      Driving restrictions: You are recommended against driving     **You should NOT operate a motor vehicle while under the influence of an opiate medication, muscle relaxant, or other sedative.  Doing so may lead to an accident resulting in serious injury or death to yourself or others.  You have agreed to avoid driving when under the influence of this medication.       Alcohol restrictions:  You are recommended to not drink alcohol at this time unless cleared by an outpatient physician.  Drinking alcohol in your current functional condition can increase your risk of injury which could be severe.  Drinking alcohol given your current health problems can  lead to increased medical complications which could be severe.    Combining alcohol with your current medications can increase your risk of injury which could be severe.      MEDICATIONS:  Please see a full list of your medications outlined in the After Visit Summary that is attached to these Discharge Instructions.  Please note changes may have been made to your medications please refer to your discharge paperwork for your current medications and take this list with you to all your doctors appointments for your doctors to review.  Please do not resume a home medication unless the medication reconciliation sheet indicates to do so, please do not assume that a medication that you were given a prescription for is the same as a medication you have at home based on both medications having the same name as dosages and frequency may have changed.      Unless specifically noted in your medication list provided to you in your discharge paper work do not resume prior vitamins, minerals, or supplements you may have been taking prior to your hospitalization unless instructed by an outpatient physician in the future.  Discuss with your primary care at next visit if applicable.      NSAID Warning:  Please avoid NSAID (including but not limited to advil, aleve, motrin, naproxen, ibuprofen, mobic, meloxicam, diclofenac etc) medications as NSAID medications may increase your risk of bleeding (which can be life-threatening), stroke, worsening kidney disease, and developing worsening GI ulcers,.   Blood thinners prescribed to optimize long and short term health and decrease risk of complications but with risks related to bleeding and other complications.    Aspirin instructions  TAKE aspirin daily unless instructed otherwise by a doctor.    This medication will need to be managed by your outpatient physician(s) after discharge.  Follow-up with the appropriate provider as soon as possible to ensure appropriate use.    This is a blood  thinner.  It is being recommended for use by you (or your family) to decrease the risk of clotting which can be severely disabling and even life-threatening.  Even when provided as recommended it can cause severe disabling and even life-threatening bleeding.  Too much or too little of this blood thinner further increases your risk of this medication causing serious and even life-threatening complications such as severe bleeding, clots, strokes, and death.  With that said, at this time based on best available evidence and consensus agreement, your physicians recommend you take aspirin based on your overall risks and benefits in your specific medical situation.      If you (or your family/caregiver) notice black stools, bloody stools, vomit blood, develop new weakness, slurred speech, confusion or have any other concerning symptoms call 911 or obtain transportation to nearest emergency room immediately.       Sedating Medications with increased risk of complications:      There are risks associated with opioid medications, including dependence, addiction and tolerance. The patient understands and agrees to use these medications only as prescribed. Potential side effects of the medications include, but are not limited to, constipation, drowsiness, addiction, impaired judgment and risk of fatal overdose if not taken as prescribed. You should not drive after taking this medication.  The patient was warned against driving while taking sedation medications.  Sharing medications is a felony. At this point in time, the patient is showing no signs of addiction, abuse, diversion or suicidal ideation.    Hydromorphone (opiate) pain medication has been used to help your acute pain.  You tolerated this medication adequately during your recent hospital stay.       You will be given a limited supply of hydromorphone on discharge; should you require additional refills/medications, your PCP and/or surgeon may prescribe at his/her  discretion.   This can be quite helpful particularly for severe acute pain.      There are risks associated with opioid medications. They can increase your risk of falling, injury, and breathing difficulties which can be severe and even life-threatening.  Note it is advisable to limit use of opiate pain medications as they can become habit forming and lead to addiction.  Other potential side effects of the medication include, but are not limited to, constipation, drowsiness, impaired judgment and risk of fatal overdose if not taken as prescribed. You should not drive while taking this medication  The patient was warned against driving while taking sedation medications.  Sharing medications is a felony. At this point in time, the patient is showing no signs of addiction, abuse, diversion or suicidal ideation.  The patient (you/or relevant caregiver) understands and agrees to use this medication only as prescribed.    You have been continued on chronic opiate pain medications on discharge; should you require additional refills/medications, your PCP and/or surgeon/pain provider may prescribe at his/her discretion.   They can be quite helpful particularly for severe acute pain and at times chronic pain.  They can increase your risk of falling and injury which can be severe and even life-threatening.  Note it is advisable to limit use of pain medications for these reasons as well as they can become habit forming and lead to addiction.      Methocarbamol (Robaxin) pain/muscle spasm medication has been used to help your acute pain and spasms.  You tolerated this medication adequately during your recent hospital stay.     - Do not take with alcohol (or marijuana/cannabis) while on this medication as this can cause increased confusion, breathing problems, falls, and severe injury.  - Follow-up with your primary care physician within 1-2 weeks as well as relevant specialty physician for additional management of your medical  conditions, potential refills, or adjustments of this medication.          Gabapentin has been used to help pain.  You tolerated this medication adequately during your recent hospital stay.     - Take 200 mg daily and 400 mg at night   - Do not stop this medication abruptly as this can cause seizures.  - Do not take with alcohol (or marijuana/cannabis) while on this medication as this can cause increased confusion, breathing problems, falls, and severe injury.  - This medication can increase risk of confusion, fall, and injury although you did tolerate adequately during rehab course.  - Follow-up with your primary care physician for additional management of your medical conditions, potential refills, or adjustments of this medication.        Seroquel   - Used to help sleep and restlessness - follow-up management per nursing facility providers with goal of weaning off in next few weeks    Risk of serotonin syndrome  Taking Remeron and Seroquel  may increase risk of serotonin syndrome which can lead to anxiety, restlessness, confusion, and agitation.  It may also cause sweating, elevated heart rate, diarrhea, and hypertension.  This risk is low and you the benefits from these medications currently appear to outweigh the risks.  Nevertheless, if you notice any symptoms notify your physician  or obtain transportation to nearest emergency room for evaluation (or call 911).       Risk of extrapyramidal (specific neurologic) symptoms   Taking Seroquel may increase risk of extrapyramidal symptoms which can cause tardive dyskinesia (See below), neuromalignant syndrome (See below), akathisia (feeling restless/inner restlessness), dystonia (involuntary muscle contractions resulting in abnormal postures or repetitive movements), and Parkinsonism (symptoms similar to Parkinson's disease such as tremor, rigidity, and slowing of movements).  Stopping this (these) medication(s) or reducing them may improve symptoms and other  "medications or treatment may be required as well.  Nevertheless, if you notice any symptoms notify your physician and/or seek medical attention.      Risk of tardive dyskinesia   Taking Seroquel may increase risk of tardive dyskinesia which is a neurological movement disorder cause by use of dopamine receptor blocking drugs prescribed to treat certain psychiatric and gastrointestinal conditions.  This can cause involuntary and abnormal movements of the jaw, lips, and tongue.  Typical symptoms include facial grimacing, sticking out of tongue, sucking or fish-like movements of the mouth.  In some cases, the arms and/or legs may also be affected by involuntary rapid, jerking movements (chorea), or slow, writhing movements (athetosis).  Symptoms of tardive dystonia include slower, twisting movements of larger muscles of the neck, trunk, as well as the face.  The benefits from these medications currently appear to outweigh the risks per prior behavioral health/psychiatry recommendations.  Nevertheless, if you notice any symptoms or worsening symptoms notify your physician as soon as possible.  For significant or concerning symptoms and unable to contact your physician, obtain transportation to nearest emergency room for evaluation (or call 911) .      MEDICAL MANAGEMENT AT HOME specific to you:    IMAGING, ADDITIONAL FINDINGS and ISSUES to follow-up:  Check under the \"DISCHARGE PROVIDER\" section of these DISCHARGE INSTRUCTIONS for provider contact information and specific issues as well.      Follow-up imaging as discussed with your recent physicians or at discretion of your outpatient physicians to be determined at time of your appointments.    Excessive delay or lack of appropriate follow-up could potentially increase your risk of complications which could be severe and even life-threatening.  It is you or your caregiver's responsibility to ensure these tests are ordered by your outpatient providers and followed up " with accordingly.      Cancer, Renal mass and hematuria (blood in urine)   - Follow-up with hematology and urology as soon as possible after discharge.  - Discuss steroid adjustments with oncology, radiation oncology, and palliative care at next appointments or if concerns in interim    Hypertension Management:  Only take the medications prescribed for you at time of discharge - overly high or low blood pressure increases your risk for health complications    Follow-up with PCP/family doctor regularly to ensure blood pressure remains adequately controlled.      Please check your blood pressure prior to taking your blood pressure medications and keep a log that you will bring with you to your follow-up doctors' appointments.        Anemia management:  Follow-up with primary care physician at next visit.  Continue to monitor blood counts intermittently.  Follow-up management at discretion of PCP.  >If you develop increased lightheadedness, shortness of breath, chest pain, confusion, difficulty staying awake, or other concerning signs or symptoms obtain transport to nearest emergency room as soon as possible.      Urinary dysfunction management:  If you develop increased urinary frequency, burning, cramping, or difficulty urinating this may be a sign of an infection or other acute urologic issue.  Notify your PCP or urologist right away.  If it does not improve, worsen, or you develop fever, chills, sweats, confusion, or other concerning signs or symptoms, please obtain transportation to nearest emergency room.  Urinary retention can lead to significant kidney injury and infection which can be serious.  If you do not urinate for 8 hours or more or you have the urge to urinate and are unable to, please obtain transportation to nearest emergency room (or urologist office), for likely placement of ruelas.    - Follow-up with urology after discharge     Acetaminophen (Tylenol) Dosing Warning:    You may have up to 3000 mg  of acetaminophen (Tylenol) from all sources spread out over a 24 hours period.  Do not have more than that, as this can increase your risk of liver injury which can be serious.      Bowel management (constipation risk):  - Ideally you would have 1 well formed BM every 1-2 days.  Adjust bowel regimen based on that goal or as close to that as possible  - Start with taking miralax 1 time per day and senna twice daily if you become constipated   - If still constipated you can increase miralax to twice per day and if needed take either oral or by rectum dulcolax (but not at the same time)  - If unable to go for more than 4 days notify your physician for additional recommendations    - If you develop significant abdominal pain, nausea/vomiting, or other concerns seek medical attention right away.      Please note a summary of your hospital stay with relevant information for your doctors will try to be sent to them.  Please confirm with your doctors at your follow up visits that they have received this summary and have them contact [facility] Medical Records if they have not received them along with any other medical records they may require.     [facility] Phone Number:  [phone]

## 2025-05-29 NOTE — ASSESSMENT & PLAN NOTE
WBC count currently stable at 13.03.  No active s/s of infection.  Likely steroid induced.  Continue to trend routine CBC.

## 2025-05-29 NOTE — PROGRESS NOTES
Progress Note - PMR   Name: Ayla Nye 74 y.o. 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 Treatments complicated by toxicities and infection.   Most recently receiving Taoxtere with plans to start chemotherapy after discharge   MRI brain showed stable enhancing lesions within the L superior occipital lobe and L parietal lobe with surrounding vasogenic edema, stable foci of restricted diffusion within the R posterior frontal lobe most likely sequela of subacute ischemia.  CT CAP showed multiple lesions with increase in size.    Plan:   Dexamethasone 2 mg at in the evening and 3 mg in the morning (last adjusted 5/29/25)   Continue Bactrim -160mg MWF  Continue Keppra 1000mg BID  See pain insertion   Follows with Dr. Castro (Oncology), Dr Vuong (Radiation/Oncology), and Palliative as outpatient  Monitor for new or worsening pain, decreased ROM,  changes in strength, sensation, balance, and mentation,increased fatigue, SOB, edema, abdominal pain, nausea, vomiting, constipation, wt loss, worsening intake/appetite   HTN (hypertension)  Continue amlodipine 10 mg daily  Monitor BP trends   Insomnia  Continue Seroquel 25 mg qHS (12.5 mg available PRN), nighttime dose of gabapentin increased on 5/20  Remeron added 5/23 5/23: reached out to on-call oncology, Decadron decreased to 2 mg BID (has since been adjusted)   Decadron dosing modified so second dose is given earlier   Encourage sleep hygiene (routine that promotes healthy circadian rhythm,avoid caffeine later in the day, quiet/dark room at night, reduce electronic before bedtime)  Continued improvement in insomnia     Hypothyroidism  Continue home levothyroxine 50mcg daily  Cancer associated pain  Current pain regimen:  Scheduled Tylenol, gabapentin daily and qHS, Lidoderm, PRN Robaxin, PRN Dilaudid 2 mg q4h (takes 2 times daily). She is tolerating regimen well.    Continue to monitor pain levels and adjust as needed   Anemia  Hgb currently stable at 9.4.  Baseline 9-10.  Continue home cyanocobalamin 1000mcg daily.  Continue to monitor CBC  Leukocytosis  Recently within 10-15 range   Likely steroid induced   Continue to monitor CBC and signs/symptoms of infection   History of pulmonary embolism  Most recent CTA chest in March 2025 negative for PE   Previously on Xarelto but discontinued due to new foci of hemorrhage within the L occipital metastasis (April 2025)   Monitor respiratory status   Per 5/26 documentation, patient was concerned for PE, though imaging not pursued due to lack of symptoms. When I asked why she was concerned today, she states because she did not have any symptoms last time. She states her chest feels tight when she tries to take a deep breath. She denies chest pain, lightheadedness, palpitations, calf pain. Vital signs stable and no LE edema/pain. Discussed with IM, CT chest not felt to be indicated at this time. Will continue to monitor   History of Pneumocystis jirovecii pneumonia  Continue Bactrim 3x weekly   GERD (gastroesophageal reflux disease)  Continue PTA Protonix 40mg daily  History of stroke  Right frontal subacute infarct diagnosed on 04/2025  Residual left hemiparesis   Continue ASA 81mg daily and Lipitor 40mg daily.  Continue with PT/OT  Follow-up with Neurovascular in 4-6 weeks as outpatient.  Fall  Presented on 5/12/25 after experiencing mechanical fall with head strike.  Hx of L sided weakness s/p stroke in 04/2025.  CTH showed vasogenic edema L parietal vertex compatible with known brain metastasis, no hemorrhage identified.  MRI brain showed stable enhancing lesions within the L superior occipital lobe and L parietal lobe with surrounding vasogenic edema, stable foci of restricted diffusion  "within the R posterior frontal lobe most likely sequela of subacute ischemia.  Per Neurology, symptoms likely due to some increase in size of CNS metastasis on top of noted vasogenic edema, see dexamethasone dosing above   Impaired mobility and ADLs  Patient was evaluated by the rehabilitation team MD and an appropriate candidate for acute inpatient rehabilitation program at this time.  The patient will tolerate 3 hours/day 5 to 7 days/week of intensive physical, speech and occupational therapy   in order to obtain goals for community discharge  Due to the patient's functional Compared to their baseline level of function in addition to their ongoing medical needs, the patient would benefit from daily supervision from a rehabilitation physician as well as rehabilitation nursing to implement and adjust the medical as well as functional plan of care in order to meet the patient's goals.  Bladder: Monitor closely for urinary incontinence and/or retention. Monitor urine output and encourage spontaneous voids. If unable to void spontaneously, will monitor with PVR bladder scans and initiate CIC regimen.  Skin: Monitor for breakdown, frequent turns, pressure offloading  Bowel: Monitor closely for constipation and/or incontinence. Will follow BMs and adjust bowel regimen to target soft, formed stools q1-2 days, per pt's regular schedule  Discharge: pending discharge to SNF     At risk for venous thromboembolism (VTE)  Continue Lovenox while inpatient   Hematuria  Patient had asymptomatic hematuria on 5/29, UA noninfectious showing blood.   CT 5/12/25: \"KIDNEYS/URETERS: Increased size of right renal mass measuring 6.2 x 5.9 x 6.2 cm, previously 5.7 x 5.6 x 5.5 cm on 3/21/2025 (series 306 image 112). Unchanged right urothelial enhancement. No hydronephrosis or urinary tract calculi. Additional   subcentimeter hypoattenuating renal lesion(s), too small to characterize but statistically likely benign, which do not warrant " "follow-up (Radiology June 2019).   REPRODUCTIVE ORGANS: Unremarkable for patient's age.   URINARY BLADDER: Unremarkable.\"  Hemoglobin stable, pending urology recommendations        Subjective   Patient is a 74 year-old female with a past medical history of stage IV adenocarcinoma of the lung metastatic to brain, history of ischemic stroke in April 2025, GERD, hyperlipidemia, hypertension, history of pulmonary embolism, presenting to [facility] due to a progressive cancer metastases, recent fall, recent stroke and debility. She was admitted from 5/5 to 5/7 due to left-sided hemiparesis as a result of right MCA territory frontal lobe stroke. Xarelto was discontinued in April 2025 after new foci of hemorrhage within the left occipital metastasis She presented to the ED again on 5/12 after a fall with headstrike. Imaging did not show an acute traumatic injury, though did show progression of metastases. Palliative care, neurology, and heme onc consulted. She was evaluated by PT and OT and deemed an appropiate candidate for [facility] due to functional deficits.     Chief Complaint: f/u ambulatory dysfunction and increased weakness    Interval:   Seen and evaluated patient in room and in therapy gym. Patient is agreeable to increased morning dose of dexamethasone to 3 mg. Patient would also like to hold off on cycle 2 of Docetaxel for the near future but may reconsider, will follow-up with oncology outpatient. Patient's daughter called and updated, all questions answered. Last BM 5/28, continent of bowel and bladder. 5/29 labs reviewed.     Objective :  Temp:  [98.1 °F (36.7 °C)-98.7 °F (37.1 °C)] 98.7 °F (37.1 °C)  HR:  [79-91] 91  BP: (109-129)/(68-79) 120/68  Resp:  [18] 18  SpO2:  [91 %-92 %] 92 %  O2 Device: None (Room air)    Functional Update:  Physical Therapy Occupational Therapy Speech Therapy   Weight Bearing Status: Full Weight Bearing  Transfers: Moderate Assistance  Bed Mobility: Moderate Assistance  Amulation Distance " (ft): 25 feet  Ambulation:  (handrail)  Assistive Device for Ambulation: Other  Roller Walker Attachments:  (L hand scoop)  Number of Stairs: 4  Assistive Device for Stairs: Bilateral Hand Rails  Stair Assistance: Minimal Assistance  Ramp: Moderate Assistance  Assistive Device for Ramp: Hand Hold Assistance  Discharge Recommendations: Skilled Nursing Facility  DC Home with:: First Floor Setup, Family Support, Home Physical Therapy (TBD pending needs and support at Dc)   Eating: Supervision (s/u assist)  Grooming: Supervision  Bathing: Minimal Assistance  Bathing: Minimal Assistance  Upper Body Dressing: Supervision  Lower Body Dressing: Moderate Assistance  Toileting: Total Assistance  Tub/Shower Transfer: Minimal Assistance  Toilet Transfer: Maximum Assistance  Cognition: Exceptions to WNL  Cognition: Decreased Memory, Decreased Attention, Decreased Safety  Orientation: Person, Place, Time   Mode of Communication: Verbal  Speech/Language:  (minimal word finding deficits)  Cognition: Exceptions to WNL  Cognition: Decreased Memory, Decreased Executive Functions, Decreased Attention, Decreased Comprehension  Orientation: Person, Place, Time, Situation  Discharge Recommendations:  (transition to subacute level of care expected)  DC Home with:: 24 Hour Supervision, Family Support (ST services pending progress)         Physical Exam  Vitals and nursing note reviewed.   Constitutional:       General: She is not in acute distress.     Appearance: She is not ill-appearing, toxic-appearing or diaphoretic.   HENT:      Head: Normocephalic and atraumatic.      Nose: No congestion.      Mouth/Throat:      Mouth: Mucous membranes are moist.   Pulmonary:      Effort: Pulmonary effort is normal. No respiratory distress.   Abdominal:      General: There is no distension.     Skin:     General: Skin is warm and dry.     Neurological:      Mental Status: She is alert.      Comments: Alert and appropriate to questions, no acute  changes in mental status   Increased generalized weakness (LUE 1-2), antigravity intact in other extremities    Psychiatric:         Mood and Affect: Mood normal.         Behavior: Behavior normal.         Scheduled Meds:  Current Facility-Administered Medications   Medication Dose Route Frequency Provider Last Rate    acetaminophen  975 mg Oral Daily Cristian Dias MD      And    acetaminophen  650 mg Oral Daily Cristian Dias MD      And    acetaminophen  975 mg Oral Daily Cristian Dias MD      And    acetaminophen  650 mg Oral Daily Cristian Dias MD      aluminum-magnesium hydroxide-simethicone  30 mL Oral Q4H PRN Ata Cunningham MD      amLODIPine  10 mg Oral Daily ZULEYMA Viramontes      aspirin  81 mg Oral Daily Claudine Leos PA-C      atorvastatin  40 mg Oral Daily With Dinner Claudine Leos PA-C      vitamin B-12  1,000 mcg Oral Daily Claudine Leos PA-C      dexamethasone  2 mg Oral Daily Claudine Leos PA-C      And    [START ON 5/30/2025] dexamethasone  3 mg Oral Daily Claudine Leos PA-C      Diclofenac Sodium  2 g Topical 4x Daily Claudine Leos PA-C      enoxaparin  40 mg Subcutaneous Q24H Claudine Leos PA-C      gabapentin  200 mg Oral Daily Claudine Leos PA-C      And    gabapentin  400 mg Oral HS Claudine Leos PA-C      HYDROmorphone  2 mg Oral Q4H PRN Nori Goodwin MD      levETIRAcetam  1,000 mg Oral BID Claudine Leos PA-C      levothyroxine  50 mcg Oral Early Morning Claudine Leos PA-C      lidocaine  1 patch Topical Daily Claudine Leos PA-C      methocarbamol  250 mg Oral Q6H PRN Claudine Leos PA-C      mirtazapine  7.5 mg Oral HS Cristian Dias MD      naloxone  2 mg Nasal Daily PRN Nori Goodwin MD      pantoprazole  40 mg Oral Daily Before Breakfast Claudine Leos PA-C      polyethylene glycol  17 g Oral Daily PRN Nori Goodwin MD      QUEtiapine  12.5 mg Oral Daily PRN Ciaran Acuña MD      QUEtiapine  25 mg Oral HS Ciaran Acuña MD       senna  2 tablet Oral Daily Nori Goodwin MD      sulfamethoxazole-trimethoprim  1 tablet Oral Once per day on Monday Wednesday Friday Claudine Leos PA-C           Lab Results: I have reviewed the following results:  Results from last 7 days   Lab Units 05/29/25  0612 05/27/25  0533   HEMOGLOBIN g/dL 9.4* 9.7*   HEMATOCRIT % 31.7* 33.5*   WBC Thousand/uL 13.03* 12.23*   PLATELETS Thousands/uL 271 278     Results from last 7 days   Lab Units 05/29/25  0612 05/27/25  0533   BUN mg/dL 28* 29*   SODIUM mmol/L 139 138   POTASSIUM mmol/L 4.6 4.0   CHLORIDE mmol/L 105 103   CREATININE mg/dL 0.83 0.89            Claudine Leos PA-C  Physical Medicine and Rehabilitation  [facility]

## 2025-05-29 NOTE — ASSESSMENT & PLAN NOTE
Hgb currently stable at 9.4.  Baseline 9-10.  Continue home cyanocobalamin 1000mcg daily.  Continue to monitor CBC

## 2025-05-29 NOTE — ASSESSMENT & PLAN NOTE
"Patient had asymptomatic hematuria on 5/29, UA noninfectious showing blood.   CT 5/12/25: \"KIDNEYS/URETERS: Increased size of right renal mass measuring 6.2 x 5.9 x 6.2 cm, previously 5.7 x 5.6 x 5.5 cm on 3/21/2025 (series 306 image 112). Unchanged right urothelial enhancement. No hydronephrosis or urinary tract calculi. Additional   subcentimeter hypoattenuating renal lesion(s), too small to characterize but statistically likely benign, which do not warrant follow-up (Radiology June 2019).   REPRODUCTIVE ORGANS: Unremarkable for patient's age.   URINARY BLADDER: Unremarkable.\"  Hemoglobin stable, pending urology recommendations      "

## 2025-05-29 NOTE — PROGRESS NOTES
Progress Note - Internal Medicine   Name: Ayla Nye 74 y.o. 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  Hx of radiation in 08/2024 and 01/2025.  Hx of hold on treatments due to multiple admission/toxicities/PCP pneumonia/PE.  Most recently receiving Taoxtere with plans to start chemotherapy after admission.    MRI brain showed stable enhancing lesions within the L superior occipital lobe and L parietal lobe with surrounding vasogenic edema, stable foci of restricted diffusion within the R posterior frontal lobe most likely sequela of subacute ischemia.  CT CAP showed multiple lesions with increase in size.    Continue increased dose of dexamethasone 3mg BID.  Decreased to 2mg BID on 5/23 after discussion between Primary team and Oncology.  Continue Bactrim -160mg MWF.  Continue Keppra 1000mg BID.    Follows with Dr. Castro (Oncology), Dr Vuong (Radiation/Oncology), and Palliative as outpatient.  Follow-up with Neurology in 4-6 weeks as outpatient.    Noted to have worsening L sided weakness through the weekend.  CTH on 5/27 showed persistent asymmetric L parietal white matter lucency most compatible with vasogenic edema.  Discussed increasing steroids again but patient declines at this time.  HTN (hypertension)  Home regimen: amlodipine 5mg daily.  Continue amlodipine 10mg daily.  Discontinued lisinopril 5mg 5/28.  Monitor BP with routine VS.  Insomnia  Continue Remeron 7.5mg at HS and Seroquel 25mg at HS.  Hypothyroidism  Continue home levothyroxine 50mcg daily.  TSH 0.408 and T4 0.79 on 5/12.  Recommend repeat TSH in 6 weeks as outpatient.  Anemia  Hgb currently stable at 9.4  Baseline 9-10.  Continue home cyanocobalamin 1000mcg daily.  Asymptomatic.  Continue to trend routine CBC.  Leukocytosis  WBC count  currently stable at 13.03.  No active s/s of infection.  Likely steroid induced.  Continue to trend routine CBC.  History of pulmonary embolism  Most recent CTA chest/PE study in 03/2025 showed no PE.  Had been on Xarelto in the past but discontinued due to new foci of hemorrhage within the L occipital metastasis.  Monitor for s/s of reoccurrence.  GERD (gastroesophageal reflux disease)  Continue home Protonix 40mg daily.  Stroke-like symptoms  Presented on 5/12/25 after experiencing mechanical fall with head strike.  Hx of L sided weakness s/p stroke in 04/2025.  CTH showed vasogenic edema L parietal vertex compatible with known brain metastasis, no hemorrhage identified.  MRI brain showed stable enhancing lesions within the L superior occipital lobe and L parietal lobe with surrounding vasogenic edema, stable foci of restricted diffusion within the R posterior frontal lobe most likely sequela of subacute ischemia.    Per Neurology - symptoms likely due to some increase in size of CNS metastasis on top of noted vasogenic edema.  Home dexamethasone increased from 2mg BID to 3mg BID.  Dose decreased back to 2mg BID on 5/23.    Neurovascular checks Q shift.  Follow-up with Neurology in 4-6 weeks as outpatient.    Primary team following.  PT/OT/SLP.  History of stroke  R frontal subacute infarct in 04/2025.  Continue ASA 81mg daily and Lipitor 40mg daily.  Continue with PT/OT.  Follow-up with Neurovascular in 4-6 weeks as outpatient.  Hematuria  Developed hematuria x1 on 5/28.  UA obtained - no signs of infection.  PVRs WNL.  Discussed with Urology - recommending outpatient follow-up.    VTE Pharmacologic Prophylaxis:   Pharmacologic: Enoxaparin (Lovenox)  Mechanical VTE Prophylaxis in Place: Yes - sequential compression devices.    Current Length of Stay: 14 day(s)    Current Patient Status: Inpatient Rehab     Discharge Plan: As per primary treatment team.    Code Status: Level 3 - DNAR and DNI    Subjective:    Patient examined after returning to bed from therapy. Patient endorses continued left side weakness. It has been stable the last few days. She has mild left shoulder pain controlled with heating pad and topical medication. No other complaints at this time.     Objective:     Vitals:   Temp (24hrs), Av.5 °F (36.9 °C), Min:98.1 °F (36.7 °C), Max:98.7 °F (37.1 °C)    Temp:  [98.1 °F (36.7 °C)-98.7 °F (37.1 °C)] 98.7 °F (37.1 °C)  HR:  [79-91] 91  Resp:  [18] 18  BP: (109-129)/(68-79) 120/68  SpO2:  [91 %-92 %] 92 %  Body mass index is 21.45 kg/m².     Review of Systems   Constitutional: Negative.  Negative for appetite change, fatigue and fever.   HENT: Negative.  Negative for congestion, postnasal drip and sore throat.    Eyes: Negative.  Negative for pain and redness.   Respiratory: Negative.  Negative for cough, chest tightness, shortness of breath and wheezing.    Cardiovascular: Negative.  Negative for chest pain.   Gastrointestinal: Negative.  Negative for abdominal pain, constipation, diarrhea and nausea.        LBM 5/28   Endocrine: Negative.    Genitourinary: Negative.  Negative for dysuria and frequency.   Musculoskeletal:  Positive for myalgias (slight left shoulder pain).   Skin: Negative.    Allergic/Immunologic: Negative.    Neurological:  Positive for weakness (continued left sided weakness). Negative for dizziness and headaches.   Hematological: Negative.    Psychiatric/Behavioral: Negative.  Negative for agitation and dysphoric mood.    All other systems reviewed and are negative.       Input and Output Summary (last 24 hours):       Intake/Output Summary (Last 24 hours) at 2025 0908  Last data filed at 2025 0824  Gross per 24 hour   Intake 560 ml   Output --   Net 560 ml       Physical Exam:     Physical Exam  Vitals reviewed.   Constitutional:       General: She is not in acute distress.     Appearance: Normal appearance. She is not ill-appearing.   HENT:      Head: Normocephalic and  atraumatic.      Nose: Nose normal.     Eyes:      Conjunctiva/sclera: Conjunctivae normal.      Pupils: Pupils are equal, round, and reactive to light.       Cardiovascular:      Rate and Rhythm: Normal rate and regular rhythm.      Heart sounds: Murmur heard.      Systolic murmur is present with a grade of 2/6.   Pulmonary:      Effort: Pulmonary effort is normal.      Breath sounds: Normal breath sounds. No stridor. No wheezing or rhonchi.   Abdominal:      General: Abdomen is flat. There is no distension.      Palpations: Abdomen is soft.      Tenderness: There is no abdominal tenderness. There is no guarding.     Musculoskeletal:      Cervical back: Normal range of motion.      Right lower leg: No edema.      Left lower leg: No edema.     Skin:     General: Skin is warm and dry.      Capillary Refill: Capillary refill takes less than 2 seconds.     Neurological:      Mental Status: She is alert and oriented to person, place, and time. Mental status is at baseline.      Motor: Weakness (left sided weakness 3/5) present.      Gait: Gait abnormal (wheelchair bound).     Psychiatric:         Mood and Affect: Mood normal.         Behavior: Behavior normal.         Thought Content: Thought content normal.         Judgment: Judgment normal.         Additional Data:     Labs:    Results from last 7 days   Lab Units 05/29/25  0612   WBC Thousand/uL 13.03*   HEMOGLOBIN g/dL 9.4*   HEMATOCRIT % 31.7*   PLATELETS Thousands/uL 271   BANDS PCT % 1   LYMPHO PCT % 3*   MONO PCT % 3*   EOS PCT % 0     Results from last 7 days   Lab Units 05/29/25  0612   SODIUM mmol/L 139   POTASSIUM mmol/L 4.6   CHLORIDE mmol/L 105   CO2 mmol/L 26   BUN mg/dL 28*   CREATININE mg/dL 0.83   ANION GAP mmol/L 8   CALCIUM mg/dL 8.9   GLUCOSE RANDOM mg/dL 79                       Labs reviewed    Imaging:    Imaging reviewed    Recent Cultures (last 7 days):           [employer] software was used to dictate this note.  It may contain errors with  dictating incorrect words or incorrect spelling. Please contact the provider directly with any questions.\

## 2025-05-29 NOTE — ASSESSMENT & PLAN NOTE
Hgb currently stable at 9.4  Baseline 9-10.  Continue home cyanocobalamin 1000mcg daily.  Asymptomatic.  Continue to trend routine CBC.

## 2025-05-29 NOTE — PLAN OF CARE
Problem: GASTROINTESTINAL - ADULT  Goal: Minimal or absence of nausea and/or vomiting  Description: INTERVENTIONS:  - Administer IV fluids if ordered to ensure adequate hydration  - Provide nonpharmacologic comfort measures as appropriate  - Consider nutrition services referral to assist patient with adequate nutrition and appropriate food choices  Outcome: Progressing     Problem: GENITOURINARY - ADULT  Goal: Maintains or returns to baseline urinary function  Description: INTERVENTIONS:  - Assess urinary function  - Encourage oral fluids to ensure adequate hydration if ordered  - Administer IV fluids as ordered to ensure adequate hydration  - Administer ordered medications as needed  - Offer frequent toileting  - Follow urinary retention protocol if ordered  Outcome: Progressing

## 2025-05-29 NOTE — PROGRESS NOTES
"   05/29/25 1230   Pain Assessment   Pain Score 1   Pain Location/Orientation Orientation: Left;Location: Shoulder   Hospital Pain Intervention(s)   (aqua ste[facility] pad)   Restrictions/Precautions   Precautions Bed/chair alarms;Fall Risk;Cognitive;Supervision on toilet/commode;Visual deficit   Braces or Orthoses Sling  (L UE)   Cognition   Arousal/Participation Alert;Cooperative   Attention Attends with cues to redirect   Memory Decreased short term memory;Decreased recall of recent events   Following Commands Follows one step commands with increased time or repetition   Comments L UE inattention   Subjective   Subjective \"I am sleeping better but I am feeling tired now\"   Sit to Lying   Type of Assistance Needed Physical assistance   Physical Assistance Level 26%-50%   Comment assist for LE   Sit to Lying CARE Score 3   Lying to Sitting on Side of Bed   Type of Assistance Needed Physical assistance   Physical Assistance Level 26%-50%   Comment min A with HOB elevated, needed mod A a initial sitting at EOB   Lying to Sitting on Side of Bed CARE Score 3   Sit to Stand   Type of Assistance Needed Physical assistance   Physical Assistance Level 26%-50%   Comment using armrest/ railing   Sit to Stand CARE Score 3   Bed-Chair Transfer   Type of Assistance Needed Physical assistance   Physical Assistance Level 26%-50%   Comment SPT to the R with L knee blocking   Chair/Bed-to-Chair Transfer CARE Score 3   Walk 10 Feet   Type of Assistance Needed Physical assistance   Physical Assistance Level Total assistance   Comment using R HR in hallway, mod A to block R knee on stance, CF for safety, 2 major kne buckling noted   Walk 10 Feet CARE Score 1   Walk 50 Feet with Two Turns   Reason if not Attempted Safety concerns   Walk 50 Feet with Two Turns CARE Score 88   Walk 150 Feet   Reason if not Attempted Safety concerns   Walk 150 Feet CARE Score 88   Ambulation   Primary Mode of Locomotion Prior to Admission Walk   Distance Walked " (feet) 10 ft  (X 2)   Assist Device   (R railing in hallway)   Gait Pattern Decreased foot clearance;Narrow BILL;Decreased L stance;L knee leonides;L knee hyperextension;Improper weight shift;Step to   Provided Assistance with: Trunk Support;Weight Shift;Limb Stabilization   Walk Assist Level Moderate Assist;Chair Follow   Findings only walked 2 rounds on 10 feet and then refused to walk more   Does the patient walk? 2. Yes   Wheel 50 Feet with Two Turns   Type of Assistance Needed Physical assistance   Physical Assistance Level 51%-75%   Comment assist for path/ direction   Wheel 50 Feet with Two Turns CARE Score 2   Wheel 150 Feet   Comment limted by fatigue   Reason if not Attempted Safety concerns   Wheel 150 Feet CARE Score 88   Wheelchair mobility   Method Right upper extremity;Right lower extremity   Assistance Provided For Remove Leg Rest;Replace Leg Rest;Locking Brakes;Remove armrests;Replace armrests;Curbs   Distance Level Surface (feet) 105 ft  (80 X 1)   Does the patient use a wheelchair? 1. Yes   Type of Wheelchair Used 1. Manual   Curb or Single Stair   Reason if not Attempted Safety concerns   1 Step (Curb) CARE Score 88   4 Steps   Reason if not Attempted Safety concerns   4 Steps CARE Score 88   12 Steps   Reason if not Attempted Safety concerns   12 Steps CARE Score 88   Toilet Transfer   Type of Assistance Needed Physical assistance   Physical Assistance Level 26%-50%   Comment using grab bar , min A to the R, mod A to the L   Toilet Transfer CARE Score 3   Toilet Transfer   Findings 2nd person for toileting task   Therapeutic Interventions   Strengthening seated LAQ and hip flex with 2# wt on R LE , L LE AAROm as she tends to get fatigue, add squeee with ball, ankle PF and DF, supine SAQ, bridging over bolster   Flexibility B hamstring and gastroc stretching   Equipment Use   NuStep Level 1 X 10 mins with R hand attachement and theraband and tactile cues  to keep L LE in neutral position    Assessment   Treatment Assessment Pt. engaged in 90 mins session and was able to tolerate above activities with rest breaks as needed. She gets fatigued quickly and is affecting overall performance . SHe was able to ambulate 10 feet using railing in hallway , was able to advance L  LE but with dec knee control in stance with 2-3 of buckling noted. She also noted to have dec trunk control when she initially sits at EOB and have dec ability to correct independently. She was assisted back to bed at end of session to take a nap. No other complaints reported. Might be d/c to SNF tomorrow pending bed availability.   Problem List Decreased strength;Decreased endurance;Impaired balance;Decreased mobility;Decreased coordination;Decreased cognition;Impaired judgement;Decreased safety awareness;Impaired vision;Impaired tone;Decreased skin integrity   Barriers to Discharge Inaccessible home environment;Decreased caregiver support   PT Barriers   Functional Limitation Car transfers;Standing;Transfers;Walking;Wheelchair management   Plan   Treatment/Interventions Functional transfer training;LE strengthening/ROM;Elevations;Therapeutic exercise;Endurance training;Patient/family training;Equipment eval/education;Bed mobility;Gait training   Discharge Recommendation   Rehab Resource Intensity Level, PT   (SNF)   PT Therapy Minutes   PT Time In 1230   PT Time Out 1400   PT Total Time (minutes) 90   PT Mode of treatment - Individual (minutes) 90   PT Mode of treatment - Concurrent (minutes) 0   PT Mode of treatment - Group (minutes) 0   PT Mode of treatment - Co-treat (minutes) 0   PT Mode of Treatment - Total time(minutes) 90 minutes   PT Cumulative Minutes 870   Therapy Time missed   Time missed? No

## 2025-05-29 NOTE — NURSING NOTE
Pt has clear yellow urine the whole shift, no c/o pain or discomfort with urination. Encouraged to increase fluid intake.

## 2025-05-29 NOTE — ASSESSMENT & PLAN NOTE
Continue Seroquel 25 mg qHS (12.5 mg available PRN), nighttime dose of gabapentin increased on 5/20  Remeron added 5/23 5/23: reached out to on-call oncology, Decadron decreased to 2 mg BID (has since been adjusted)   Decadron dosing modified so second dose is given earlier   Encourage sleep hygiene (routine that promotes healthy circadian rhythm,avoid caffeine later in the day, quiet/dark room at night, reduce electronic before bedtime)  Continued improvement in insomnia

## 2025-05-29 NOTE — ASSESSMENT & PLAN NOTE
Current pain regimen: Scheduled Tylenol, gabapentin daily and qHS, Lidoderm, PRN Robaxin, PRN Dilaudid 2 mg q4h (takes 2 times daily). She is tolerating regimen well.    Continue to monitor pain levels and adjust as needed

## 2025-05-29 NOTE — CASE MANAGEMENT
Case Management    Presented patient and daughter with subacute options of accepting facilities. Patient agreeable to [facility] for subacute rehab. Call report to  [phone] Fax: [phone]   Transport confirmed for 12pm with [facility] [phone]

## 2025-05-29 NOTE — PROGRESS NOTES
"   05/29/25 1015   Pain Assessment   Pain Assessment Tool 0-10   Pain Score No Pain   Restrictions/Precautions   Precautions Bed/chair alarms;Cognitive;Fall Risk;Supervision on toilet/commode;Visual deficit  (L rae)   Weight Bearing Restrictions No   ROM Restrictions No   Lifestyle   Autonomy \"I just don't have strength\"   Sit to Lying   Type of Assistance Needed Physical assistance   Physical Assistance Level 26%-50%   Comment guarding at trunk and assist w/ BLE, cues for positioning of LUE   Sit to Lying CARE Score 3   Lying to Sitting on Side of Bed   Type of Assistance Needed Physical assistance   Physical Assistance Level 51%-75%   Comment cues and assist for LUE and trunk   Lying to Sitting on Side of Bed CARE Score 2   Sit to Stand   Type of Assistance Needed Physical assistance   Physical Assistance Level 26%-50%   Comment min-modA. Myla if following step by step cues, up to modA w/ poor tech, reaching for items to pull self up on   Sit to Stand CARE Score 3   Bed-Chair Transfer   Type of Assistance Needed Physical assistance   Physical Assistance Level 26%-50%   Comment Myla to R when following step by step cues, modA to L. ModA if pt was not provided step by step cues for safe pivoting   Chair/Bed-to-Chair Transfer CARE Score 3   Functional Standing Tolerance   Comments one trial for standing tolerance at elevated table top pt reports max fatigue at end of trial. In stance w/ modA for L knee block and guarding, heavy reliance of RUE on table for balance support. 1min 36s of standing at modA overall. Completed to inc activity tolerance for inc IND w/ aDLs and transfers   ROM - Left Upper Extremities    L Shoulder PROM;Flexion;Extension   L Elbow PROM;Prolonged stretch;Elbow flexion;Elbow extension   L Wrist PROM;Wrist flexion;Wrist extension;Radial deviation;Ulnar deviation   L Hand PROM;Prolonged stretch;Thumb;Index finger;Long finger;Ring finger;Little finger;AROM   L Position Seated   L Weight/Reps/Sets " 30x total breaks as needed, tolerated well denying pain/discomfort   LUE ROM Comment ongoing severe Lhemi weakness. Demos some AROM for FF grasp, min AROM for shoulder shrug   Neuromuscular Education   LUE Weight Bearing Extended arm seated   Response to Weight Bearing Technique EOM, ongoing inattention and weakness requiring extensive assist for positioning and up to TA when fatigued and displaying inattention   Trunk Control EOM reaching activity to copy peg board from design placed on pts R, copy design on board placed anteriorly. Required extra time, pt making mulitple mistakes but most she was able to self correct. Min cues for correcting second to last line. One LOB to rae L side that required maxA to correct, several other mild LOB that pt was able to self correct w/ only CGA. Pt sat EOM for over 10min w/ overall good tolerance this date.   Cognition   Overall Cognitive Status Impaired   Arousal/Participation Alert;Cooperative   Attention Attends with cues to redirect   Memory Decreased short term memory;Decreased recall of recent events   Following Commands Follows one step commands with increased time or repetition   Activity Tolerance   Activity Tolerance Patient limited by fatigue   Assessment   Treatment Assessment OT session focusing on NMR, trunk control, LUE PROM/AAROM, transfers and one standing tolerance trial. Pt fatigues by end of session, returned to bed to rest. Pt cotinues to requires skilled hands on assist to maintain her safety. OT to continue POC while subacute placement is pending.   Prognosis Fair   Problem List Decreased strength;Decreased range of motion;Decreased endurance;Impaired balance;Decreased mobility;Decreased coordination;Decreased cognition;Impaired judgement;Decreased safety awareness;Impaired vision;Impaired tone;Decreased skin integrity   Plan   Treatment/Interventions ADL retraining;Functional transfer training;Therapeutic exercise;Endurance training;Cognitive  reorientation;Patient/family training;Equipment eval/education;Compensatory technique education;Continued evaluation;Spoke to MD;Spoke to nursing   Progress Slow progress, decreased activity tolerance   OT Therapy Minutes   OT Time In 1015   OT Time Out 1130   OT Total Time (minutes) 75   OT Mode of treatment - Individual (minutes) 75   OT Mode of treatment - Concurrent (minutes) 0   OT Mode of treatment - Group (minutes) 0   OT Mode of treatment - Co-treat (minutes) 0   OT Mode of Treatment - Total time(minutes) 75 minutes   OT Cumulative Minutes 95

## 2025-05-29 NOTE — ASSESSMENT & PLAN NOTE
Developed hematuria x1 on 5/28.  UA obtained - no signs of infection.  PVRs WNL.  Discussed with Urology - recommending outpatient follow-up.

## 2025-05-29 NOTE — ASSESSMENT & PLAN NOTE
HPI:   Diagnosed in 07/2024, s/p radiation in 08/2024 and 01/2025. Treatments complicated by toxicities and infection.   Most recently receiving Taoxtere with plans to start chemotherapy after discharge   MRI brain showed stable enhancing lesions within the L superior occipital lobe and L parietal lobe with surrounding vasogenic edema, stable foci of restricted diffusion within the R posterior frontal lobe most likely sequela of subacute ischemia.  CT CAP showed multiple lesions with increase in size.    Plan:   Dexamethasone 2 mg at in the evening and 3 mg in the morning (last adjusted 5/29/25)   Continue Bactrim -160mg MWF  Continue Keppra 1000mg BID  See pain insertion   Follows with Dr. Castro (Oncology), Dr Vuong (Radiation/Oncology), and Palliative as outpatient  Monitor for new or worsening pain, decreased ROM,  changes in strength, sensation, balance, and mentation,increased fatigue, SOB, edema, abdominal pain, nausea, vomiting, constipation, wt loss, worsening intake/appetite

## 2025-05-29 NOTE — PROGRESS NOTES
05/29/25 4655   Pain Assessment   Pain Assessment Tool 0-10   Pain Score No Pain   Restrictions/Precautions   Precautions Bed/chair alarms;Cognitive;Fall Risk;Supervision on toilet/commode;Visual deficit   Comprehension   Comprehension (FIM) 5 - Understands basic directions and conversation   Expression   Expression (FIM) 5 - Needs help/cues only RARELY (< 10% of the time)   Social Interaction   Social Interaction (FIM) 6 - Interacts appropriately with others BUT requires extra  time   Problem Solving   Problem solving (FIM) 4 - Solves basic problems 75-89% of time   Memory   Memory (FIM) 4 - Recognizes/recalls/performs 75-89%   Speech/Language/Cognition Assessmetn   Treatment Assessment Pt was awake, alert and cooperative for session in which pt continues to recall daily events w/ fairly good accuracy but if information was more vague, SLP would provide pt probing questions to elicit more information. Of note, pt still is aware of planned transition to subacute level of care but not sure of which day (today vs tomorrow) in addition to which possible facility (now [facility]). Otherwise, SLP continuing to target ST/working memory recall given 4 words per list and then SLP asking pt to recall at least 2 words per list pending the question to ID by attribute.  Pt was able to independently ID 13/17 words, but when providing semantic single cue only, pt increased to 15/17 and w/ both semantic and initial letter cue, increased to 17/17 accurate. Lastly, SLP did d/w pt about ST recall strategies, such as self repetition/rehearsal of words to increase recall. SLP providing written handout Memory Tips for functional impairments of brain injury. SLP providing review given the initial page which reviews the following PRACTICAL strategies which includes the following: use self talk to remember something; repeat what is said; practice a new task over time; structure in new habits; have a place for everything; write it  down; post prominent reminder round the house; make associations. However, SLP did also review the types of memory: LT, ST and immediate memory. Handout provided for pt to continue to review and use upon return home. At this time, pt will continue to benefit from ongoing skilled SLP services targeting functional cognitive skills in hopes for decreasing caregiver burden over time.   SLP Therapy Minutes   SLP Time In 0930   SLP Time Out 1000   SLP Total Time (minutes) 30   SLP Mode of treatment - Individual (minutes) 30   SLP Mode of treatment - Concurrent (minutes) 0   SLP Mode of treatment - Group (minutes) 0   SLP Mode of treatment - Co-treat (minutes) 0   SLP Mode of Treatment - Total time(minutes) 30 minutes   SLP Cumulative Minutes 315   Therapy Time missed   Time missed? No

## 2025-05-29 NOTE — ASSESSMENT & PLAN NOTE
Presented on 5/12/25 after experiencing mechanical fall with head strike.  Hx of L sided weakness s/p stroke in 04/2025.  CTH showed vasogenic edema L parietal vertex compatible with known brain metastasis, no hemorrhage identified.  MRI brain showed stable enhancing lesions within the L superior occipital lobe and L parietal lobe with surrounding vasogenic edema, stable foci of restricted diffusion within the R posterior frontal lobe most likely sequela of subacute ischemia.  Per Neurology, symptoms likely due to some increase in size of CNS metastasis on top of noted vasogenic edema, see dexamethasone dosing above

## 2025-05-30 VITALS
OXYGEN SATURATION: 92 % | TEMPERATURE: 97.8 F | BODY MASS INDEX: 21.95 KG/M2 | SYSTOLIC BLOOD PRESSURE: 150 MMHG | RESPIRATION RATE: 18 BRPM | WEIGHT: 119.27 LBS | HEIGHT: 62 IN | DIASTOLIC BLOOD PRESSURE: 80 MMHG | HEART RATE: 82 BPM

## 2025-05-30 PROCEDURE — 99232 SBSQ HOSP IP/OBS MODERATE 35: CPT | Performed by: INTERNAL MEDICINE

## 2025-05-30 PROCEDURE — 99238 HOSP IP/OBS DSCHRG MGMT 30/<: CPT

## 2025-05-30 PROCEDURE — 97535 SELF CARE MNGMENT TRAINING: CPT

## 2025-05-30 RX ORDER — GABAPENTIN 100 MG/1
CAPSULE ORAL
Start: 2025-05-30

## 2025-05-30 RX ORDER — DEXAMETHASONE 2 MG/1
TABLET ORAL
Start: 2025-05-30 | End: 2025-05-30

## 2025-05-30 RX ORDER — ACETAMINOPHEN 500 MG
1000 TABLET ORAL 3 TIMES DAILY PRN
Start: 2025-05-30 | End: 2025-06-17

## 2025-05-30 RX ORDER — HYDROMORPHONE HYDROCHLORIDE 2 MG/1
2 TABLET ORAL EVERY 6 HOURS PRN
Qty: 10 TABLET | Refills: 0 | Status: SHIPPED | OUTPATIENT
Start: 2025-05-30 | End: 2025-06-07

## 2025-05-30 RX ORDER — ENOXAPARIN SODIUM 100 MG/ML
40 INJECTION SUBCUTANEOUS EVERY 24 HOURS
Start: 2025-05-30

## 2025-05-30 RX ORDER — DEXAMETHASONE 2 MG/1
TABLET ORAL
Start: 2025-05-30

## 2025-05-30 RX ORDER — MIRTAZAPINE 7.5 MG/1
7.5 TABLET, FILM COATED ORAL
Start: 2025-05-30

## 2025-05-30 RX ORDER — SENNOSIDES 8.6 MG
17.2 TABLET ORAL DAILY
Start: 2025-05-31

## 2025-05-30 RX ORDER — QUETIAPINE FUMARATE 25 MG/1
25 TABLET, FILM COATED ORAL
Start: 2025-05-30 | End: 2025-05-30

## 2025-05-30 RX ORDER — AMLODIPINE BESYLATE 10 MG/1
10 TABLET ORAL DAILY
Start: 2025-05-31

## 2025-05-30 RX ORDER — LIDOCAINE 50 MG/G
1 PATCH TOPICAL DAILY
Start: 2025-05-31

## 2025-05-30 RX ORDER — ACETAMINOPHEN 325 MG/1
TABLET ORAL
Start: 2025-05-30 | End: 2025-05-30

## 2025-05-30 RX ORDER — MAGNESIUM HYDROXIDE/ALUMINUM HYDROXICE/SIMETHICONE 120; 1200; 1200 MG/30ML; MG/30ML; MG/30ML
30 SUSPENSION ORAL EVERY 4 HOURS PRN
Start: 2025-05-30

## 2025-05-30 RX ORDER — SULFAMETHOXAZOLE AND TRIMETHOPRIM 800; 160 MG/1; MG/1
1 TABLET ORAL 3 TIMES WEEKLY
Start: 2025-06-02 | End: 2025-07-26

## 2025-05-30 RX ORDER — QUETIAPINE FUMARATE 25 MG/1
25 TABLET, FILM COATED ORAL
Start: 2025-05-30 | End: 2025-06-29

## 2025-05-30 RX ORDER — PANTOPRAZOLE SODIUM 40 MG/1
40 TABLET, DELAYED RELEASE ORAL
Start: 2025-05-31

## 2025-05-30 RX ADMIN — ASPIRIN 81 MG CHEWABLE TABLET 81 MG: 81 TABLET CHEWABLE at 08:06

## 2025-05-30 RX ADMIN — DEXAMETHASONE 3 MG: 0.5 TABLET ORAL at 08:07

## 2025-05-30 RX ADMIN — CYANOCOBALAMIN TAB 1000 MCG 1000 MCG: 1000 TAB at 08:06

## 2025-05-30 RX ADMIN — ACETAMINOPHEN 975 MG: 325 TABLET, FILM COATED ORAL at 06:13

## 2025-05-30 RX ADMIN — PANTOPRAZOLE SODIUM 40 MG: 40 TABLET, DELAYED RELEASE ORAL at 06:14

## 2025-05-30 RX ADMIN — SULFAMETHOXAZOLE AND TRIMETHOPRIM 1 TABLET: 800; 160 TABLET ORAL at 08:06

## 2025-05-30 RX ADMIN — AMLODIPINE BESYLATE 10 MG: 10 TABLET ORAL at 08:06

## 2025-05-30 RX ADMIN — LEVOTHYROXINE SODIUM 50 MCG: 0.05 TABLET ORAL at 06:14

## 2025-05-30 RX ADMIN — LEVETIRACETAM 1000 MG: 500 TABLET, FILM COATED ORAL at 08:06

## 2025-05-30 RX ADMIN — GABAPENTIN 200 MG: 100 CAPSULE ORAL at 08:06

## 2025-05-30 NOTE — ASSESSMENT & PLAN NOTE
Continue Seroquel 25 mg qHS, nighttime dose of gabapentin increased on 5/20  Remeron added 5/23 5/23: reached out to on-call oncology, Decadron decreased to 2 mg BID (has since been adjusted)   Decadron dosing modified so second dose is given earlier   Encouraged sleep hygiene (routine that promotes healthy circadian rhythm,avoid caffeine later in the day, quiet/dark room at night, reduce electronic before bedtime)  Improvement in insomnia noted   Follow-up with PCP

## 2025-05-30 NOTE — ASSESSMENT & PLAN NOTE
HPI:   Diagnosed in 07/2024, s/p radiation in 08/2024 and 01/2025. Treatments complicated by toxicities and infection.   Most recently receiving Taoxtere with plans to start chemotherapy after discharge   MRI brain showed stable enhancing lesions within the L superior occipital lobe and L parietal lobe with surrounding vasogenic edema, stable foci of restricted diffusion within the R posterior frontal lobe most likely sequela of subacute ischemia.  CT CAP showed multiple lesions with increase in size.    Plan:   Dexamethasone 2 mg at in the evening and 3 mg in the morning (last adjusted 5/29/25)   Of note, decreased steroid dose (2 mg BID) due to insomnia caused generalized weakness.   Continue Bactrim -160mg MWF  Continue Keppra 1000mg BID  See pain insertion   Follow-up with Dr. Castro (Oncology), Dr Vuong (Radiation/Oncology), and Palliative as outpatient  Monitored for new or worsening pain, decreased ROM,  changes in strength, sensation, balance, and mentation,increased fatigue, SOB, edema, abdominal pain, nausea, vomiting, constipation, wt loss, worsening intake/appetite ; patient was fatigued and generally weak, otherwise stable while in [facility]

## 2025-05-30 NOTE — ASSESSMENT & PLAN NOTE
Hgb currently stable at 9.4.  Baseline 9-10.  Continue home cyanocobalamin 1000mcg daily.  CBC monitored in [facility]   Follow-up with PCP   16-Jul-2023

## 2025-05-30 NOTE — ASSESSMENT & PLAN NOTE
Most recent CTA chest in March 2025 negative for PE   Previously on Xarelto but discontinued due to new foci of hemorrhage within the L occipital metastasis (April 2025)   Monitor respiratory status   Per 5/26 documentation, patient was concerned for PE, though imaging not pursued due to lack of symptoms. When I asked why she was concerned today, she states because she did not have any symptoms last time. She states her chest feels tight when she tries to take a deep breath. She denies chest pain, lightheadedness, palpitations, calf pain. Vital signs stable and no LE edema/pain. Discussed with IM, CT chest not felt to be indicated at this time.   Vitals and clinical status stable throughout [facility] admission

## 2025-05-30 NOTE — ASSESSMENT & PLAN NOTE
Pain regimen: Scheduled Tylenol, gabapentin daily and qHS, Lidoderm, PRN Robaxin, PRN Dilaudid 2 mg q6h. She tolerated this regimen.    Continue to monitor pain levels and adjust as needed   Follow-up with palliative

## 2025-05-30 NOTE — PROGRESS NOTES
Progress Note - Internal Medicine   Name: Ayla Nye 74 y.o. 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  Hx of radiation in 08/2024 and 01/2025.  Hx of hold on treatments due to multiple admission/toxicities/PCP pneumonia/PE.  Most recently receiving Taoxtere with plans to start chemotherapy after admission.    MRI brain showed stable enhancing lesions within the L superior occipital lobe and L parietal lobe with surrounding vasogenic edema, stable foci of restricted diffusion within the R posterior frontal lobe most likely sequela of subacute ischemia.  CT CAP showed multiple lesions with increase in size.    Continue increased dose of dexamethasone 3mg BID.  Decreased to 2mg BID on 5/23 after discussion between Primary team and Oncology.  Continue Bactrim -160mg MWF.  Continue Keppra 1000mg BID.    Follows with Dr. Castro (Oncology), Dr Vuong (Radiation/Oncology), and Palliative as outpatient.  Follow-up with Neurology in 4-6 weeks as outpatient.    Noted to have worsening L sided weakness through the weekend.  CTH on 5/27 showed persistent asymmetric L parietal white matter lucency most compatible with vasogenic edema.  Discussed increasing steroids again but patient declines at this time.  HTN (hypertension)  Home regimen: amlodipine 5mg daily.  Continue amlodipine 10mg daily.  Discontinued lisinopril 5mg 5/28.  Monitor BP with routine VS.  Insomnia  Continue Remeron 7.5mg at HS and Seroquel 25mg at HS.  Hypothyroidism  Continue home levothyroxine 50mcg daily.  TSH 0.408 and T4 0.79 on 5/12.  Recommend repeat TSH in 6 weeks as outpatient.  Anemia  Hgb currently stable at 9.4  Baseline 9-10.  Continue home cyanocobalamin 1000mcg daily.  Asymptomatic.  Continue to trend routine CBC.  Leukocytosis  WBC count  currently stable at 13.03.  No active s/s of infection.  Likely steroid induced.  Continue to trend routine CBC.  History of pulmonary embolism  Most recent CTA chest/PE study in 03/2025 showed no PE.  Had been on Xarelto in the past but discontinued due to new foci of hemorrhage within the L occipital metastasis.  Monitor for s/s of reoccurrence.  GERD (gastroesophageal reflux disease)  Continue home Protonix 40mg daily.  Stroke-like symptoms  Presented on 5/12/25 after experiencing mechanical fall with head strike.  Hx of L sided weakness s/p stroke in 04/2025.  CTH showed vasogenic edema L parietal vertex compatible with known brain metastasis, no hemorrhage identified.  MRI brain showed stable enhancing lesions within the L superior occipital lobe and L parietal lobe with surrounding vasogenic edema, stable foci of restricted diffusion within the R posterior frontal lobe most likely sequela of subacute ischemia.    Per Neurology - symptoms likely due to some increase in size of CNS metastasis on top of noted vasogenic edema.  Home dexamethasone increased from 2mg BID to 3mg BID.  Dose decreased back to 2mg BID on 5/23.    Neurovascular checks Q shift.  Follow-up with Neurology in 4-6 weeks as outpatient.    Primary team following.  PT/OT/SLP.  History of stroke  R frontal subacute infarct in 04/2025.  Continue ASA 81mg daily and Lipitor 40mg daily.  Continue with PT/OT.  Follow-up with Neurovascular in 4-6 weeks as outpatient.  Hematuria  Developed hematuria x1 on 5/28.  UA obtained - no signs of infection.  PVRs WNL.  Discussed with Urology - recommending outpatient follow-up.    VTE Pharmacologic Prophylaxis:   Pharmacologic: Enoxaparin (Lovenox)  Mechanical VTE Prophylaxis in Place: Yes- sequential compression devices.    Current Length of Stay: 15 day(s)    Current Patient Status: Inpatient Rehab     Discharge Plan: As per primary treatment team.  at 12 PM today.    Code Status: Level 3 - DNAR and  DNI    Subjective:   Patient seen and examined in her bed this morning with her friend Lacey at bedside. Patient gave permission to speak with her while family friend was in the room. She admits to feeling fatigued today but otherwise no complaints. She has chronic 3/10 lumbar pain which is controlled. Discussed discharge at noon today. She has no questions or concerns at this time.     Objective:     Vitals:   Temp (24hrs), Av.3 °F (36.8 °C), Min:97.8 °F (36.6 °C), Max:98.6 °F (37 °C)    Temp:  [97.8 °F (36.6 °C)-98.6 °F (37 °C)] 97.8 °F (36.6 °C)  HR:  [80-82] 82  Resp:  [18] 18  BP: (113-150)/(68-80) 150/80  SpO2:  [90 %-92 %] 92 %  Body mass index is 21.81 kg/m².     Review of Systems   Constitutional:  Positive for fatigue. Negative for appetite change, chills and fever.   HENT: Negative.  Negative for congestion, rhinorrhea and sore throat.    Eyes: Negative.  Negative for pain and redness.   Respiratory: Negative.  Negative for cough, chest tightness and shortness of breath.    Cardiovascular: Negative.  Negative for chest pain.   Gastrointestinal: Negative.  Negative for abdominal pain, constipation, diarrhea and nausea.        LBM    Endocrine: Negative.    Genitourinary: Negative.  Negative for dysuria, frequency and urgency.   Musculoskeletal:  Positive for back pain (3/10 chronic, well controlled).   Skin: Negative.    Allergic/Immunologic: Negative.    Neurological:  Positive for weakness (left sided at baseline). Negative for dizziness and headaches.   Hematological: Negative.    Psychiatric/Behavioral: Negative.  Negative for behavioral problems, dysphoric mood and sleep disturbance.    All other systems reviewed and are negative.       Input and Output Summary (last 24 hours):       Intake/Output Summary (Last 24 hours) at 2025 0910  Last data filed at 2025 2101  Gross per 24 hour   Intake 290 ml   Output --   Net 290 ml       Physical Exam:     Physical Exam  Vitals reviewed.    Constitutional:       General: She is not in acute distress.     Appearance: Normal appearance. She is not ill-appearing.   HENT:      Head: Normocephalic and atraumatic.      Nose: Nose normal.     Eyes:      Conjunctiva/sclera: Conjunctivae normal.      Pupils: Pupils are equal, round, and reactive to light.       Cardiovascular:      Rate and Rhythm: Normal rate and regular rhythm.      Heart sounds: Murmur heard.   Pulmonary:      Effort: Pulmonary effort is normal.      Breath sounds: Normal breath sounds. No wheezing or rhonchi.   Abdominal:      General: Abdomen is flat. There is no distension.      Palpations: Abdomen is soft.      Tenderness: There is no abdominal tenderness. There is no guarding.     Musculoskeletal:      Cervical back: Normal range of motion.      Right lower leg: No edema.      Left lower leg: No edema.     Skin:     General: Skin is warm and dry.      Capillary Refill: Capillary refill takes less than 2 seconds.     Neurological:      Mental Status: She is alert and oriented to person, place, and time. Mental status is at baseline.      Motor: Weakness (Left sided 3/5 stable) present.     Psychiatric:         Mood and Affect: Mood normal.         Behavior: Behavior normal.         Thought Content: Thought content normal.         Judgment: Judgment normal.         Additional Data:     Labs:    Results from last 7 days   Lab Units 05/29/25  0612   WBC Thousand/uL 13.03*   HEMOGLOBIN g/dL 9.4*   HEMATOCRIT % 31.7*   PLATELETS Thousands/uL 271   BANDS PCT % 1   LYMPHO PCT % 3*   MONO PCT % 3*   EOS PCT % 0     Results from last 7 days   Lab Units 05/29/25  0612   SODIUM mmol/L 139   POTASSIUM mmol/L 4.6   CHLORIDE mmol/L 105   CO2 mmol/L 26   BUN mg/dL 28*   CREATININE mg/dL 0.83   ANION GAP mmol/L 8   CALCIUM mg/dL 8.9   GLUCOSE RANDOM mg/dL 79                       Labs reviewed    Imaging:    Imaging reviewed    Recent Cultures (last 7 days):         M*Modal software was used to  dictate this note.  It may contain errors with dictating incorrect words or incorrect spelling. Please contact the provider directly with any questions.

## 2025-05-30 NOTE — PROGRESS NOTES
"   05/30/25 0830   Pain Assessment   Pain Assessment Tool 0-10   Pain Score No Pain   Restrictions/Precautions   Precautions Bed/chair alarms;Cognitive;Fall Risk;Pain;Supervision on toilet/commode;Visual deficit   Lifestyle   Autonomy \"I feel so good getting a shower\"   Oral Hygiene   Type of Assistance Needed Supervision   Comment seated   Oral Hygiene CARE Score 4   Shower/Bathe Self   Type of Assistance Needed Physical assistance   Physical Assistance Level 25% or less   Comment CGA at all times seated on shower chair 2' L rae w/ lean when fatigued. Vandana overall for feet washing and for RUE 2' LUE rae. In stance w/ Vandana pt washes groin and buttocks   Shower/Bathe Self CARE Score 3   Bathing   Assessed Bath Style Shower   Tub/Shower Transfer   Findings vandana w/ step by step cues inout of wet shower environment WC <>shower chair   Upper Body Dressing   Type of Assistance Needed Physical assistance   Physical Assistance Level 26%-50%   Comment cues and rae tech seated modA   Upper Body Dressing CARE Score 3   Lower Body Dressing   Type of Assistance Needed Physical assistance   Physical Assistance Level 76% or more   Comment maxA for threading BLE and for CM over hips in stance   Lower Body Dressing CARE Score 2   Putting On/Taking Off Footwear   Type of Assistance Needed Physical assistance   Physical Assistance Level 76% or more   Comment maxA for don/doff socks   Putting On/Taking Off Footwear CARE Score 2   Sit to Lying   Type of Assistance Needed Physical assistance   Physical Assistance Level 25% or less   Sit to Lying CARE Score 3   Lying to Sitting on Side of Bed   Type of Assistance Needed Physical assistance   Physical Assistance Level 26%-50%   Comment bed rail and HOB elevated   Lying to Sitting on Side of Bed CARE Score 3   Sit to Stand   Type of Assistance Needed Physical assistance   Physical Assistance Level 25% or less   Comment Vandana requires cues for hand placement   Sit to Stand CARE Score 3 "   Bed-Chair Transfer   Type of Assistance Needed Physical assistance   Physical Assistance Level 25% or less   Comment Myla w/ direct cues for tech including hand placement   Chair/Bed-to-Chair Transfer CARE Score 3   Toileting Hygiene   Type of Assistance Needed Physical assistance   Physical Assistance Level 76% or more   Toileting Hygiene CARE Score 2   Toilet Transfer   Type of Assistance Needed Physical assistance   Physical Assistance Level 25% or less   Comment Myla SPt but requries direct cues for safety and tech to limit amount of PA required   Toilet Transfer CARE Score 3   Cognition   Overall Cognitive Status Impaired   Arousal/Participation Alert;Cooperative   Attention Attends with cues to redirect   Orientation Level Oriented X4   Memory Decreased short term memory;Decreased recall of recent events;Decreased recall of precautions   Following Commands Follows one step commands with increased time or repetition   Activity Tolerance   Activity Tolerance Patient tolerated treatment well   Assessment   Treatment Assessment OT session focusing on DC ADL. Pt overall requries min-maxA for basic ADLs. Plan for transisiton to subacute facility today for ongoing skilled OT. Pt continues to requrie skilled hands on assist in order to maintain her safety. Her progress on unit was limited d/t decreasing activity tolerance t/o stay, medical complexity.   Prognosis Fair   Problem List Decreased strength;Decreased range of motion;Decreased endurance;Impaired balance;Decreased mobility;Decreased coordination;Decreased cognition;Impaired judgement;Decreased safety awareness;Impaired vision;Impaired sensation;Impaired hearing;Impaired tone;Pain   OT Therapy Minutes   OT Time In 0830   OT Time Out 0930   OT Total Time (minutes) 60   OT Mode of treatment - Individual (minutes) 60   OT Mode of treatment - Concurrent (minutes) 0   OT Mode of treatment - Group (minutes) 0   OT Mode of treatment - Co-treat (minutes) 0   OT Mode  of Treatment - Total time(minutes) 60 minutes   OT Cumulative Minutes 1015   Therapy Time missed   Time missed? No

## 2025-05-30 NOTE — PLAN OF CARE
Problem: PAIN - ADULT  Goal: Verbalizes/displays adequate comfort level or baseline comfort level  Description: Interventions:  - Encourage patient to monitor pain and request assistance  - Assess pain using appropriate pain scale  - Administer analgesics as ordered based on type and severity of pain and evaluate response  - Implement non-pharmacological measures as appropriate and evaluate response  - Consider cultural and social influences on pain and pain management  - Notify physician/advanced practitioner if interventions unsuccessful or patient reports new pain  - Educate patient/family on pain management process including their role and importance of  reporting pain   - Provide non-pharmacologic/complimentary pain relief interventions  Outcome: Progressing     Problem: DISCHARGE PLANNING  Goal: Discharge to home or other facility with appropriate resources  Description: INTERVENTIONS:  - Identify barriers to discharge w/patient and caregiver  - Arrange for needed discharge resources and transportation as appropriate  - Identify discharge learning needs (meds, wound care, etc.)  - Refer to Case Management Department for coordinating discharge planning if the patient needs post-hospital services based on physician/advanced practitioner order or complex needs related to functional status, cognitive ability, or social support system  Outcome: Progressing

## 2025-05-30 NOTE — PLAN OF CARE
Problem: PAIN - ADULT  Goal: Verbalizes/displays adequate comfort level or baseline comfort level  Description: Interventions:  - Encourage patient to monitor pain and request assistance  - Assess pain using appropriate pain scale  - Administer analgesics as ordered based on type and severity of pain and evaluate response  - Implement non-pharmacological measures as appropriate and evaluate response  - Consider cultural and social influences on pain and pain management  - Notify physician/advanced practitioner if interventions unsuccessful or patient reports new pain  - Educate patient/family on pain management process including their role and importance of  reporting pain   - Provide non-pharmacologic/complimentary pain relief interventions  5/30/2025 1212 by Terra Celaya RN  Outcome: Adequate for Discharge  5/30/2025 1036 by Terra Celaya RN  Outcome: Progressing

## 2025-05-30 NOTE — ASSESSMENT & PLAN NOTE
"Patient had asymptomatic hematuria on 5/29, UA noninfectious showing blood. This did not reoccur while in [facility].   CT 5/12/25: \"KIDNEYS/URETERS: Increased size of right renal mass measuring 6.2 x 5.9 x 6.2 cm, previously 5.7 x 5.6 x 5.5 cm on 3/21/2025 (series 306 image 112). Unchanged right urothelial enhancement. No hydronephrosis or urinary tract calculi. Additional   subcentimeter hypoattenuating renal lesion(s), too small to characterize but statistically likely benign, which do not warrant follow-up (Radiology June 2019).   REPRODUCTIVE ORGANS: Unremarkable for patient's age.   URINARY BLADDER: Unremarkable.\"  Hemoglobin stable  Urology recommended outpatient follow-up, did not recommend further imaging at this time  Referred to urology, patient advised      "

## 2025-05-30 NOTE — ASSESSMENT & PLAN NOTE
Presented  to ED on 5/12/25 after experiencing mechanical fall with head strike.  Hx of L sided weakness s/p stroke in 04/2025.  CTH showed vasogenic edema L parietal vertex compatible with known brain metastasis, no hemorrhage identified.  MRI brain showed stable enhancing lesions within the L superior occipital lobe and L parietal lobe with surrounding vasogenic edema, stable foci of restricted diffusion within the R posterior frontal lobe most likely sequela of subacute ischemia.  Per Neurology, symptoms likely due to some increase in size of CNS metastasis on top of noted vasogenic edema, see dexamethasone dosing above

## 2025-05-30 NOTE — ASSESSMENT & PLAN NOTE
Patient was placed on 900/7 program due to fatigue   Discharged to SNF for further therapy and assistance

## 2025-05-30 NOTE — ASSESSMENT & PLAN NOTE
Right frontal subacute infarct diagnosed on 04/2025  Residual left hemiparesis   Continue ASA 81mg daily and Lipitor 40mg daily.  Continue with PT/OT  Patient noted with generalized weakness, repeat CT head wo contrast on 5/27/25 obtained and stable.   Follow-up with neurology scheduled

## 2025-05-30 NOTE — DISCHARGE SUMMARY
"Discharge Summary - PMR   Name: Ayla Nye 74 y.o. 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 She was admitted from 5/5 to 5/7 due to left-sided hemiparesis as a result of right MCA territory frontal lobe stroke. Xarelto was discontinued in April 2025 after new foci of hemorrhage within the left occipital metastasis She presented to the ED again on 5/12 after a fall with headstrike. Imaging did not show an acute traumatic injury, though did show progression of metastases. Palliative care, neurology, and heme onc consulted. She was evaluated by PT and OT and deemed an appropiate candidate for [facility] due to functional deficits     Procedures Performed During [facility] Admission:   5/27/25 CT head wo contrast:   \"FINDINGS:  PARENCHYMA AND EXTRA-AXIAL SPACES:  No acute hemorrhage, extra-axial fluid collection, mass effect or midline shift.   Persistent asymmetric left parietal white matter lucency most compatible with vasogenic edema likely treatment change.  Persistent 0.3 cm left parietal nodule hyperdense white matter nodule. Correlates with MR susceptibility artifact.  Similar, nonspecific patchy periventricular and subcortical white matter lucencies most " "commonly related to changes of microangiopathy.  New small lucencies compatible with evolved previously seen right frontal infarcts. Now chronic.  VENTRICLES:  Within normal limits for patient age.  VISUALIZED ORBITS: Within normal limits.  PARANASAL SINUSES: Well aerated.  CALVARIUM AND EXTRACRANIAL SOFT TISSUES:  Within normal limits. Mastoid air cells are well aerated.  IMPRESSION:  No acute intracranial pathology compared to recent prior studies.  Stable findings above.\"      Acute Rehabilitation Center Course: Patient participated in a comprehensive interdisciplinary inpatient rehabilitation program which included involvment of MD, therapies (PT, OT, and/or SLP), RN, CM, SW, and dietary services. She will continue to require further rehabilitation and considered safe to be discharged to SNF to continue her recovery process. Please see below for patient's day to day management of medical needs.    Assessment & Plan  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)  HPI:   Diagnosed in 07/2024, s/p radiation in 08/2024 and 01/2025. Treatments complicated by toxicities and infection.   Most recently receiving Taoxtere with plans to start chemotherapy after discharge   MRI brain showed stable enhancing lesions within the L superior occipital lobe and L parietal lobe with surrounding vasogenic edema, stable foci of restricted diffusion within the R posterior frontal lobe most likely sequela of subacute ischemia.  CT CAP showed multiple lesions with increase in size.    Plan:   Dexamethasone 2 mg at in the evening and 3 mg in the morning (last adjusted 5/29/25)   Of note, decreased steroid dose (2 mg BID) due to insomnia caused generalized weakness.   Continue Bactrim -160mg MWF  Continue Keppra 1000mg BID  See pain insertion   Follow-up with Dr. Castro (Oncology), Dr Vuong (Radiation/Oncology), and Palliative as outpatient  Monitored for new or worsening pain, decreased ROM,  changes in strength, " sensation, balance, and mentation,increased fatigue, SOB, edema, abdominal pain, nausea, vomiting, constipation, wt loss, worsening intake/appetite ; patient was fatigued and generally weak, otherwise stable while in ARC  HTN (hypertension)  Continue amlodipine 10 mg daily  Follow-up with PCP   Insomnia  Continue Seroquel 25 mg qHS, nighttime dose of gabapentin increased on 5/20  Remeron added 5/23 5/23: reached out to on-call oncology, Decadron decreased to 2 mg BID (has since been adjusted)   Decadron dosing modified so second dose is given earlier   Encouraged sleep hygiene (routine that promotes healthy circadian rhythm,avoid caffeine later in the day, quiet/dark room at night, reduce electronic before bedtime)  Improvement in insomnia noted   Follow-up with PCP     Hypothyroidism  Continue home levothyroxine 50mcg daily  Follow-up with PCP   Cancer associated pain  Pain regimen: Scheduled Tylenol, gabapentin daily and qHS, Lidoderm, PRN Robaxin, PRN Dilaudid 2 mg q6h. She tolerated this regimen.    Continue to monitor pain levels and adjust as needed   Follow-up with palliative   Anemia  Hgb currently stable at 9.4.  Baseline 9-10.  Continue home cyanocobalamin 1000mcg daily.  CBC monitored in [facility]   Follow-up with PCP  Leukocytosis  Recently within 10-15 range   Likely steroid induced   CBC monitored in [facility]   Follow-up with PCP   History of pulmonary embolism  Most recent CTA chest in March 2025 negative for PE   Previously on Xarelto but discontinued due to new foci of hemorrhage within the L occipital metastasis (April 2025)   Monitor respiratory status   Per 5/26 documentation, patient was concerned for PE, though imaging not pursued due to lack of symptoms. When I asked why she was concerned today, she states because she did not have any symptoms last time. She states her chest feels tight when she tries to take a deep breath. She denies chest pain, lightheadedness, palpitations, calf pain. Vital signs  "stable and no LE edema/pain. Discussed with IM, CT chest not felt to be indicated at this time.   Vitals and clinical status stable throughout [facility] admission   History of Pneumocystis jirovecii pneumonia  Continue Bactrim 3x weekly   Follow-up with PCP  GERD (gastroesophageal reflux disease)  Continue PTA Protonix 40mg daily  Follow-up with PCP  History of stroke  Right frontal subacute infarct diagnosed on 04/2025  Residual left hemiparesis   Continue ASA 81mg daily and Lipitor 40mg daily.  Continue with PT/OT  Patient noted with generalized weakness, repeat CT head wo contrast on 5/27/25 obtained and stable.   Follow-up with neurology scheduled   Fall  Presented  to ED on 5/12/25 after experiencing mechanical fall with head strike.  Hx of L sided weakness s/p stroke in 04/2025.  CTH showed vasogenic edema L parietal vertex compatible with known brain metastasis, no hemorrhage identified.  MRI brain showed stable enhancing lesions within the L superior occipital lobe and L parietal lobe with surrounding vasogenic edema, stable foci of restricted diffusion within the R posterior frontal lobe most likely sequela of subacute ischemia.  Per Neurology, symptoms likely due to some increase in size of CNS metastasis on top of noted vasogenic edema, see dexamethasone dosing above   Impaired mobility and ADLs  Patient was placed on 900/7 program due to fatigue   Discharged to SNF for further therapy and assistance     At risk for venous thromboembolism (VTE)  Continue Lovenox, further use at discretion of PCP  Follow-up with PCP  Hematuria  Patient had asymptomatic hematuria on 5/29, UA noninfectious showing blood. This did not reoccur while in [facility].   CT 5/12/25: \"KIDNEYS/URETERS: Increased size of right renal mass measuring 6.2 x 5.9 x 6.2 cm, previously 5.7 x 5.6 x 5.5 cm on 3/21/2025 (series 306 image 112). Unchanged right urothelial enhancement. No hydronephrosis or urinary tract calculi. Additional   subcentimeter " "hypoattenuating renal lesion(s), too small to characterize but statistically likely benign, which do not warrant follow-up (Radiology June 2019).   REPRODUCTIVE ORGANS: Unremarkable for patient's age.   URINARY BLADDER: Unremarkable.\"  Hemoglobin stable  Urology recommended outpatient follow-up, did not recommend further imaging at this time  Referred to urology, patient advised        Physical Exam  Vitals and nursing note reviewed.   Constitutional:       General: She is not in acute distress.     Appearance: She is not ill-appearing, toxic-appearing or diaphoretic.   HENT:      Head: Normocephalic and atraumatic.      Nose: No congestion.      Mouth/Throat:      Mouth: Mucous membranes are moist.   Pulmonary:      Effort: Pulmonary effort is normal. No respiratory distress.   Abdominal:      General: There is no distension.     Skin:     General: Skin is warm and dry.     Neurological:      Mental Status: She is alert.      Comments: Alert and appropriate to questions, no acute changes in mental status   Stable generalized weakness (LUE 1-2), antigravity intact in other extremities    Psychiatric:         Mood and Affect: Mood normal.         Behavior: Behavior normal.             Complications: N/A    Functional Status Upon Admission to [facility]:    OT: bathe mod A, dressing max A, toilet hygiene and transfer mod A  PT: transfers min A, roll left right mod A, sit to stand mod A, walk 10 feet Myla x 1 with HHA that progressed to modA with fatigue, walk 50 feet total A      Functional Status Upon Discharge from [facility]:   Physical Therapy Occupational Therapy Speech Therapy     5/29/25:   Transfers: mod A  Walk 10 feet: total assist, knee buckling noted   Wheel 50 feet: mod A for path/direction    5/29/25:  Sit to lying/lying to sitting/sit to stand/bed to chair: mod A    5/28/25:  Oral hygiene: set-up  Shower: mod A   LB dressing/footwear: mod A   Toilet transfer: Mod A  5/29/25:   Comprehension   Comprehension (FIM) 5 - " Understands basic directions and conversation   Expression   Expression (FIM) 5 - Needs help/cues only RARELY (< 10% of the time)   Social Interaction   Social Interaction (FIM) 6 - Interacts appropriately with others BUT requires extra  time   Problem Solving   Problem solving (FIM) 4 - Solves basic problems 75-89% of time   Memory   Memory (FIM) 4 - Recognizes/recalls/performs 75-89%     Regular diet            Discharge Diagnosis: Impairment of mobility, safety and Activities of Daily Living (ADLs) due to Brain Dysfunction:  02.1  Non-Traumatic    Discharge Medications:   See after visit summary for reconciled discharge medications provided to patient and family.      Condition at Discharge: stable     Discharge instructions/Information to patient and family:   See after visit summary for information provided to patient and family.      Provisions for Follow-Up Care:  See after visit summary for information related to follow-up care and any pertinent home health orders.      Future Appointments   Date Time Provider Department Center   6/16/2025 12:30 PM [place] [facility] CHAIR 3 [facility]   6/27/2025 10:15 AM AN MRI 1 AN MRI [facility]   7/7/2025 11:00 AM [place] [facility] CHAIR 13 [facility]   7/8/2025  9:30 AM Honey Gamboa MD AN Rad Onc [facility]   7/28/2025 12:30 PM [place] INF CHAIR 9 [facility]       Disposition: Skilled nursing facility at [facility]  Phone: [phone]   Fax: [phone]     Planned Readmission: No      Discharge Medications:  See after visit summary for reconciled discharge medications provided to patient and family.      Claudine Leos PA-C  Physical Medicine and Rehabilitation  [facility]

## 2025-05-31 ENCOUNTER — TELEPHONE (OUTPATIENT)
Dept: OTHER | Facility: OTHER | Age: 75
End: 2025-05-31

## 2025-05-31 ENCOUNTER — HOSPITAL ENCOUNTER (INPATIENT)
Facility: HOSPITAL | Age: 75
LOS: 6 days | Discharge: NURSING, REHAB OR LONG-TERM CARE | End: 2025-06-07
Attending: EMERGENCY MEDICINE | Admitting: INTERNAL MEDICINE
Payer: MEDICARE

## 2025-05-31 ENCOUNTER — APPOINTMENT (EMERGENCY)
Dept: CT IMAGING | Facility: HOSPITAL | Age: 75
End: 2025-05-31
Payer: MEDICARE

## 2025-05-31 DIAGNOSIS — N28.89 RIGHT KIDNEY MASS: ICD-10-CM

## 2025-05-31 DIAGNOSIS — C49.5: ICD-10-CM

## 2025-05-31 DIAGNOSIS — B59 PNEUMONIA OF RIGHT UPPER LOBE DUE TO PNEUMOCYSTIS JIROVECII (HCC): ICD-10-CM

## 2025-05-31 DIAGNOSIS — G93.89 BRAIN MASS: ICD-10-CM

## 2025-05-31 DIAGNOSIS — R10.9 RIGHT FLANK PAIN: Primary | ICD-10-CM

## 2025-05-31 DIAGNOSIS — Z51.5 PALLIATIVE CARE BY SPECIALIST: ICD-10-CM

## 2025-05-31 DIAGNOSIS — C79.31 METASTATIC CANCER TO BRAIN (HCC): ICD-10-CM

## 2025-05-31 DIAGNOSIS — C79.9 METASTATIC ADENOCARCINOMA (HCC): ICD-10-CM

## 2025-05-31 DIAGNOSIS — G89.3 CANCER ASSOCIATED PAIN: ICD-10-CM

## 2025-05-31 DIAGNOSIS — G93.6 VASOGENIC BRAIN EDEMA (HCC): ICD-10-CM

## 2025-05-31 PROBLEM — Z13.9 ENCOUNTER FOR SCREENING INVOLVING SOCIAL DETERMINANTS OF HEALTH (SDOH): Status: ACTIVE | Noted: 2024-01-01

## 2025-05-31 LAB
ALBUMIN SERPL BCG-MCNC: 3.5 G/DL (ref 3.5–5)
ALP SERPL-CCNC: 52 U/L (ref 34–104)
ALT SERPL W P-5'-P-CCNC: 12 U/L (ref 7–52)
ANION GAP SERPL CALCULATED.3IONS-SCNC: 9 MMOL/L (ref 4–13)
ANISOCYTOSIS BLD QL SMEAR: PRESENT
AST SERPL W P-5'-P-CCNC: 9 U/L (ref 13–39)
BACTERIA UR QL AUTO: ABNORMAL /HPF
BASOPHILS # BLD MANUAL: 0 THOUSAND/UL (ref 0–0.1)
BASOPHILS NFR MAR MANUAL: 0 % (ref 0–1)
BILIRUB SERPL-MCNC: 0.19 MG/DL (ref 0.2–1)
BILIRUB UR QL STRIP: NEGATIVE
BUN SERPL-MCNC: 29 MG/DL (ref 5–25)
CALCIUM SERPL-MCNC: 9.4 MG/DL (ref 8.4–10.2)
CHLORIDE SERPL-SCNC: 104 MMOL/L (ref 96–108)
CLARITY UR: CLEAR
CO2 SERPL-SCNC: 23 MMOL/L (ref 21–32)
COLOR UR: ABNORMAL
CREAT SERPL-MCNC: 0.84 MG/DL (ref 0.6–1.3)
DACRYOCYTES BLD QL SMEAR: PRESENT
EOSINOPHIL # BLD MANUAL: 0 THOUSAND/UL (ref 0–0.4)
EOSINOPHIL NFR BLD MANUAL: 0 % (ref 0–6)
ERYTHROCYTE [DISTWIDTH] IN BLOOD BY AUTOMATED COUNT: 16.6 % (ref 11.6–15.1)
GFR SERPL CREATININE-BSD FRML MDRD: 68 ML/MIN/1.73SQ M
GLUCOSE SERPL-MCNC: 123 MG/DL (ref 65–140)
GLUCOSE UR STRIP-MCNC: NEGATIVE MG/DL
HCT VFR BLD AUTO: 34.7 % (ref 34.8–46.1)
HGB BLD-MCNC: 10.9 G/DL (ref 11.5–15.4)
HGB UR QL STRIP.AUTO: ABNORMAL
KETONES UR STRIP-MCNC: NEGATIVE MG/DL
LEUKOCYTE ESTERASE UR QL STRIP: ABNORMAL
LG PLATELETS BLD QL SMEAR: PRESENT
LIPASE SERPL-CCNC: 37 U/L (ref 11–82)
LYMPHOCYTES # BLD AUTO: 0.32 THOUSAND/UL (ref 0.6–4.47)
LYMPHOCYTES # BLD AUTO: 2 % (ref 14–44)
MCH RBC QN AUTO: 28.2 PG (ref 26.8–34.3)
MCHC RBC AUTO-ENTMCNC: 31.4 G/DL (ref 31.4–37.4)
MCV RBC AUTO: 90 FL (ref 82–98)
METAMYELOCYTE ABSOLUTE CT: 0.48 THOUSAND/UL (ref 0–0.1)
METAMYELOCYTES NFR BLD MANUAL: 3 % (ref 0–1)
MONOCYTES # BLD AUTO: 0.97 THOUSAND/UL (ref 0–1.22)
MONOCYTES NFR BLD: 6 % (ref 4–12)
MYELOCYTE ABSOLUTE CT: 0.32 THOUSAND/UL (ref 0–0.1)
MYELOCYTES NFR BLD MANUAL: 2 % (ref 0–1)
NEUTROPHILS # BLD MANUAL: 14.04 THOUSAND/UL (ref 1.85–7.62)
NEUTS BAND NFR BLD MANUAL: 4 % (ref 0–8)
NEUTS SEG NFR BLD AUTO: 83 % (ref 43–75)
NITRITE UR QL STRIP: NEGATIVE
NON-SQ EPI CELLS URNS QL MICRO: ABNORMAL /HPF
PH UR STRIP.AUTO: 5.5 [PH]
PLATELET # BLD AUTO: 267 THOUSANDS/UL (ref 149–390)
PLATELET BLD QL SMEAR: ADEQUATE
PMV BLD AUTO: 9.2 FL (ref 8.9–12.7)
POIKILOCYTOSIS BLD QL SMEAR: PRESENT
POLYCHROMASIA BLD QL SMEAR: PRESENT
POTASSIUM SERPL-SCNC: 4.3 MMOL/L (ref 3.5–5.3)
PROCALCITONIN SERPL-MCNC: 0.16 NG/ML
PROT SERPL-MCNC: 6.5 G/DL (ref 6.4–8.4)
PROT UR STRIP-MCNC: ABNORMAL MG/DL
RBC # BLD AUTO: 3.86 MILLION/UL (ref 3.81–5.12)
RBC #/AREA URNS AUTO: ABNORMAL /HPF
RBC MORPH BLD: PRESENT
SODIUM SERPL-SCNC: 136 MMOL/L (ref 135–147)
SP GR UR STRIP.AUTO: 1.02 (ref 1–1.03)
UROBILINOGEN UR STRIP-ACNC: <2 MG/DL
WBC # BLD AUTO: 16.14 THOUSAND/UL (ref 4.31–10.16)
WBC #/AREA URNS AUTO: ABNORMAL /HPF

## 2025-05-31 PROCEDURE — 99284 EMERGENCY DEPT VISIT MOD MDM: CPT

## 2025-05-31 PROCEDURE — 96361 HYDRATE IV INFUSION ADD-ON: CPT

## 2025-05-31 PROCEDURE — 84145 PROCALCITONIN (PCT): CPT | Performed by: EMERGENCY MEDICINE

## 2025-05-31 PROCEDURE — 81001 URINALYSIS AUTO W/SCOPE: CPT | Performed by: EMERGENCY MEDICINE

## 2025-05-31 PROCEDURE — 99222 1ST HOSP IP/OBS MODERATE 55: CPT | Performed by: STUDENT IN AN ORGANIZED HEALTH CARE EDUCATION/TRAINING PROGRAM

## 2025-05-31 PROCEDURE — 80053 COMPREHEN METABOLIC PANEL: CPT | Performed by: EMERGENCY MEDICINE

## 2025-05-31 PROCEDURE — 96374 THER/PROPH/DIAG INJ IV PUSH: CPT

## 2025-05-31 PROCEDURE — 85027 COMPLETE CBC AUTOMATED: CPT | Performed by: EMERGENCY MEDICINE

## 2025-05-31 PROCEDURE — 99285 EMERGENCY DEPT VISIT HI MDM: CPT | Performed by: EMERGENCY MEDICINE

## 2025-05-31 PROCEDURE — 85007 BL SMEAR W/DIFF WBC COUNT: CPT | Performed by: EMERGENCY MEDICINE

## 2025-05-31 PROCEDURE — 83690 ASSAY OF LIPASE: CPT | Performed by: EMERGENCY MEDICINE

## 2025-05-31 PROCEDURE — 36415 COLL VENOUS BLD VENIPUNCTURE: CPT | Performed by: EMERGENCY MEDICINE

## 2025-05-31 PROCEDURE — 74177 CT ABD & PELVIS W/CONTRAST: CPT

## 2025-05-31 PROCEDURE — 96375 TX/PRO/DX INJ NEW DRUG ADDON: CPT

## 2025-05-31 RX ORDER — LEVETIRACETAM 500 MG/1
1000 TABLET ORAL 2 TIMES DAILY
Status: DISCONTINUED | OUTPATIENT
Start: 2025-05-31 | End: 2025-06-07 | Stop reason: HOSPADM

## 2025-05-31 RX ORDER — ENOXAPARIN SODIUM 100 MG/ML
40 INJECTION SUBCUTANEOUS DAILY
Status: DISCONTINUED | OUTPATIENT
Start: 2025-06-01 | End: 2025-06-07 | Stop reason: HOSPADM

## 2025-05-31 RX ORDER — LEVOTHYROXINE SODIUM 50 UG/1
50 TABLET ORAL
Status: DISCONTINUED | OUTPATIENT
Start: 2025-06-01 | End: 2025-06-07 | Stop reason: HOSPADM

## 2025-05-31 RX ORDER — ACETAMINOPHEN 325 MG/1
975 TABLET ORAL EVERY 6 HOURS PRN
Status: DISCONTINUED | OUTPATIENT
Start: 2025-05-31 | End: 2025-06-01

## 2025-05-31 RX ORDER — HYDROMORPHONE HCL/PF 1 MG/ML
0.5 SYRINGE (ML) INJECTION ONCE
Refills: 0 | Status: COMPLETED | OUTPATIENT
Start: 2025-05-31 | End: 2025-05-31

## 2025-05-31 RX ORDER — SENNOSIDES 8.6 MG
2 TABLET ORAL DAILY
Status: DISCONTINUED | OUTPATIENT
Start: 2025-06-01 | End: 2025-06-07 | Stop reason: HOSPADM

## 2025-05-31 RX ORDER — HYDROMORPHONE HYDROCHLORIDE 2 MG/1
2 TABLET ORAL EVERY 4 HOURS PRN
Status: DISCONTINUED | OUTPATIENT
Start: 2025-05-31 | End: 2025-06-01

## 2025-05-31 RX ORDER — NALOXONE HYDROCHLORIDE 1 MG/ML
2 INJECTION INTRAMUSCULAR; INTRAVENOUS; SUBCUTANEOUS DAILY PRN
Status: DISCONTINUED | OUTPATIENT
Start: 2025-05-31 | End: 2025-06-07 | Stop reason: HOSPADM

## 2025-05-31 RX ORDER — ONDANSETRON 2 MG/ML
1 INJECTION INTRAMUSCULAR; INTRAVENOUS ONCE
Status: COMPLETED | OUTPATIENT
Start: 2025-05-31 | End: 2025-05-31

## 2025-05-31 RX ORDER — DEXAMETHASONE 2 MG/1
2 TABLET ORAL DAILY
Status: DISCONTINUED | OUTPATIENT
Start: 2025-05-31 | End: 2025-06-07 | Stop reason: HOSPADM

## 2025-05-31 RX ORDER — KETOROLAC TROMETHAMINE 30 MG/ML
1 INJECTION, SOLUTION INTRAMUSCULAR; INTRAVENOUS ONCE
Status: COMPLETED | OUTPATIENT
Start: 2025-05-31 | End: 2025-05-31

## 2025-05-31 RX ORDER — LEVETIRACETAM 500 MG/1
1000 TABLET ORAL 2 TIMES DAILY
Status: CANCELLED | OUTPATIENT
Start: 2025-05-31

## 2025-05-31 RX ORDER — LABETALOL HYDROCHLORIDE 5 MG/ML
10 INJECTION, SOLUTION INTRAVENOUS EVERY 6 HOURS PRN
Status: DISCONTINUED | OUTPATIENT
Start: 2025-05-31 | End: 2025-06-07 | Stop reason: HOSPADM

## 2025-05-31 RX ORDER — QUETIAPINE FUMARATE 25 MG/1
12.5 TABLET, FILM COATED ORAL DAILY PRN
Status: DISCONTINUED | OUTPATIENT
Start: 2025-05-31 | End: 2025-06-07 | Stop reason: HOSPADM

## 2025-05-31 RX ORDER — MIRTAZAPINE 15 MG/1
7.5 TABLET, FILM COATED ORAL
Status: DISCONTINUED | OUTPATIENT
Start: 2025-05-31 | End: 2025-06-07 | Stop reason: HOSPADM

## 2025-05-31 RX ORDER — HYDROMORPHONE HCL IN WATER/PF 6 MG/30 ML
0.2 PATIENT CONTROLLED ANALGESIA SYRINGE INTRAVENOUS ONCE
Status: COMPLETED | OUTPATIENT
Start: 2025-05-31 | End: 2025-05-31

## 2025-05-31 RX ORDER — GABAPENTIN 300 MG/1
300 CAPSULE ORAL
Status: DISCONTINUED | OUTPATIENT
Start: 2025-05-31 | End: 2025-06-07 | Stop reason: HOSPADM

## 2025-05-31 RX ORDER — QUETIAPINE FUMARATE 25 MG/1
25 TABLET, FILM COATED ORAL
Status: DISCONTINUED | OUTPATIENT
Start: 2025-05-31 | End: 2025-06-07 | Stop reason: HOSPADM

## 2025-05-31 RX ORDER — POLYETHYLENE GLYCOL 3350 17 G/17G
17 POWDER, FOR SOLUTION ORAL DAILY PRN
Status: DISCONTINUED | OUTPATIENT
Start: 2025-05-31 | End: 2025-06-01

## 2025-05-31 RX ORDER — ATORVASTATIN CALCIUM 40 MG/1
40 TABLET, FILM COATED ORAL
Status: DISCONTINUED | OUTPATIENT
Start: 2025-05-31 | End: 2025-06-07 | Stop reason: HOSPADM

## 2025-05-31 RX ORDER — ASPIRIN 81 MG/1
81 TABLET, CHEWABLE ORAL DAILY
Status: DISCONTINUED | OUTPATIENT
Start: 2025-06-01 | End: 2025-06-07 | Stop reason: HOSPADM

## 2025-05-31 RX ORDER — SULFAMETHOXAZOLE AND TRIMETHOPRIM 800; 160 MG/1; MG/1
1 TABLET ORAL 3 TIMES WEEKLY
Status: DISCONTINUED | OUTPATIENT
Start: 2025-06-02 | End: 2025-06-07 | Stop reason: HOSPADM

## 2025-05-31 RX ORDER — PANTOPRAZOLE SODIUM 40 MG/1
40 TABLET, DELAYED RELEASE ORAL
Status: DISCONTINUED | OUTPATIENT
Start: 2025-06-01 | End: 2025-06-07 | Stop reason: HOSPADM

## 2025-05-31 RX ORDER — DEXAMETHASONE 2 MG/1
3 TABLET ORAL DAILY
Status: DISCONTINUED | OUTPATIENT
Start: 2025-06-01 | End: 2025-05-31

## 2025-05-31 RX ORDER — LIDOCAINE 50 MG/G
1 PATCH TOPICAL DAILY
Status: DISCONTINUED | OUTPATIENT
Start: 2025-06-01 | End: 2025-06-07 | Stop reason: HOSPADM

## 2025-05-31 RX ORDER — DEXAMETHASONE 2 MG/1
2 TABLET ORAL DAILY
Status: DISCONTINUED | OUTPATIENT
Start: 2025-06-01 | End: 2025-05-31

## 2025-05-31 RX ORDER — MAGNESIUM HYDROXIDE/ALUMINUM HYDROXICE/SIMETHICONE 120; 1200; 1200 MG/30ML; MG/30ML; MG/30ML
30 SUSPENSION ORAL EVERY 4 HOURS PRN
Status: CANCELLED | OUTPATIENT
Start: 2025-05-31

## 2025-05-31 RX ORDER — METHOCARBAMOL 500 MG/1
250 TABLET, FILM COATED ORAL EVERY 6 HOURS PRN
Status: DISCONTINUED | OUTPATIENT
Start: 2025-05-31 | End: 2025-06-07 | Stop reason: HOSPADM

## 2025-05-31 RX ORDER — DEXAMETHASONE 2 MG/1
3 TABLET ORAL DAILY
Status: DISCONTINUED | OUTPATIENT
Start: 2025-06-01 | End: 2025-06-07 | Stop reason: HOSPADM

## 2025-05-31 RX ORDER — AMLODIPINE BESYLATE 10 MG/1
10 TABLET ORAL DAILY
Status: DISCONTINUED | OUTPATIENT
Start: 2025-06-01 | End: 2025-06-07 | Stop reason: HOSPADM

## 2025-05-31 RX ORDER — ASPIRIN 81 MG/1
81 TABLET, CHEWABLE ORAL DAILY
Status: CANCELLED | OUTPATIENT
Start: 2025-06-01

## 2025-05-31 RX ORDER — MAGNESIUM HYDROXIDE/ALUMINUM HYDROXICE/SIMETHICONE 120; 1200; 1200 MG/30ML; MG/30ML; MG/30ML
30 SUSPENSION ORAL EVERY 4 HOURS PRN
Status: DISCONTINUED | OUTPATIENT
Start: 2025-05-31 | End: 2025-06-07 | Stop reason: HOSPADM

## 2025-05-31 RX ORDER — GABAPENTIN 100 MG/1
200 CAPSULE ORAL DAILY
Status: DISCONTINUED | OUTPATIENT
Start: 2025-06-01 | End: 2025-06-07 | Stop reason: HOSPADM

## 2025-05-31 RX ADMIN — HYDROMORPHONE HYDROCHLORIDE 2 MG: 2 TABLET ORAL at 19:41

## 2025-05-31 RX ADMIN — SODIUM CHLORIDE 500 ML: 0.9 INJECTION, SOLUTION INTRAVENOUS at 12:57

## 2025-05-31 RX ADMIN — QUETIAPINE 25 MG: 25 TABLET ORAL at 22:34

## 2025-05-31 RX ADMIN — ACETAMINOPHEN 975 MG: 325 TABLET ORAL at 21:27

## 2025-05-31 RX ADMIN — HYDROMORPHONE HYDROCHLORIDE 0.5 MG: 1 INJECTION, SOLUTION INTRAMUSCULAR; INTRAVENOUS; SUBCUTANEOUS at 15:50

## 2025-05-31 RX ADMIN — HYDROMORPHONE HYDROCHLORIDE 0.2 MG: 0.2 INJECTION, SOLUTION INTRAMUSCULAR; INTRAVENOUS; SUBCUTANEOUS at 12:55

## 2025-05-31 RX ADMIN — ATORVASTATIN CALCIUM 40 MG: 40 TABLET, FILM COATED ORAL at 20:05

## 2025-05-31 RX ADMIN — LEVETIRACETAM 1000 MG: 500 TABLET, FILM COATED ORAL at 21:27

## 2025-05-31 RX ADMIN — GABAPENTIN 300 MG: 300 CAPSULE ORAL at 21:27

## 2025-05-31 RX ADMIN — QUETIAPINE 12.5 MG: 25 TABLET ORAL at 21:27

## 2025-05-31 RX ADMIN — MIRTAZAPINE 7.5 MG: 15 TABLET, FILM COATED ORAL at 20:05

## 2025-05-31 RX ADMIN — IOHEXOL 80 ML: 350 INJECTION, SOLUTION INTRAVENOUS at 14:29

## 2025-05-31 RX ADMIN — DEXAMETHASONE 2 MG: 2 TABLET ORAL at 20:05

## 2025-05-31 NOTE — CASE MANAGEMENT
Case Management Progress Note    Patient name Ayla Nye  Location ED-39/ED-39 MRN [id number]  : 1950 Date 2025       LOS (days): 0  Geometric Mean LOS (GMLOS) (days):   Days to GMLOS:        OBJECTIVE:        Current admission status: Emergency  Preferred Pharmacy:   [id number] - [place], [place] - [address] [facility]  [address] [facility] [address]  Phone: [phone] Fax: [phone]    [facility] [place] [address]  [address]  [facility] [address]  Phone: [phone] Fax: [phone]    Primary Care Provider: Rafy Angelo MD    Primary Insurance: MEDICARE  Secondary Insurance: AARP    PROGRESS NOTE:    Cm spoke with patients daughter to discuss dc planning. Family looking for SNF and wondering what that entails. CM explained SNF depends on level of care needed. SNF can be long term care or short term care depending on patients needs. Cm explained both do have nursing care. Family to discuss what they are looking for and Cm to meet with them in AM to discuss questions/concerns. Provider updated

## 2025-05-31 NOTE — ASSESSMENT & PLAN NOTE
Progressively worsening 9/10 dull ache right sided flank pain radiating to her abdomen and behind her leg that started yesterday.  Past three days her urine changed from clear to brown intermittently.     ED course-received 0.7 Dilaudid, Toradol 30 Mg, Zofran, 500 cc NS bolus    VS: on presentation hypertensive 204/101, last BP around for 165/88, satting 93% on 2 L (baseline RA)  Labs: WBC elevated at 16 but seems like this is her baseline in setting of steroid use, hemoglobin stable at 10.9, CMP pretty unremarkable, lipase and Pro-Du WNL  CT abd/pelv:  indicated progression of disease---diffuse metastatic disease, including enlargement of the right renal mass. There is possible invasion of the right renal vein. new mild right hydroureteronephrosis, with slightly increased density within the right ureterovesicular junction which may be blood vs focal lesion.  UA: trace leukocytes and large occult blood, 20-30 RBC, 4-10 WBC with occasional bacteria    Assessment: This is a 74-year-old female with currently 6/10 right-sided flank pain that is slowly becoming sharp in nature.  Unlikely bacterial etiology.  Symptoms likely due to disease progression.    Plan:  Monitor off antibiotics  Follow-up on urine culture  Supportive care   Pain regimen in place as this is likely due to progression of metastatic disease   If pain continues, consider palliative care consult

## 2025-05-31 NOTE — H&P
H&P - Hospitalist   Name: Ayla Nye 74 y.o. female I MRN: [id number]  Unit/Bed#: ED-39 I Date of Admission: 5/31/2025   Date of Service: 5/31/2025 I Hospital Day: 0     Assessment & Plan  Flank pain  Progressively worsening 9/10 dull ache right sided flank pain radiating to her abdomen and behind her leg that started yesterday.  Past three days her urine changed from clear to brown intermittently.     ED course-received 0.7 Dilaudid, Toradol 30 Mg, Zofran, 500 cc NS bolus    VS: on presentation hypertensive 204/101, last BP around for 165/88, satting 93% on 2 L (baseline RA)  Labs: WBC elevated at 16 but seems like this is her baseline in setting of steroid use, hemoglobin stable at 10.9, CMP pretty unremarkable, lipase and Pro-Du WNL  CT abd/pelv:  indicated progression of disease---diffuse metastatic disease, including enlargement of the right renal mass. There is possible invasion of the right renal vein. new mild right hydroureteronephrosis, with slightly increased density within the right ureterovesicular junction which may be blood vs focal lesion.  UA: trace leukocytes and large occult blood, 20-30 RBC, 4-10 WBC with occasional bacteria    Assessment: This is a 74-year-old female with currently 6/10 right-sided flank pain that is slowly becoming sharp in nature.  Unlikely bacterial etiology.  Symptoms likely due to disease progression.    Plan:  Monitor off antibiotics  Follow-up on urine culture  Supportive care   Pain regimen in place as this is likely due to progression of metastatic disease   If pain continues, consider palliative care consult  Encounter for screening involving social determinants of health (SDoH)  Pt presented to [facility] yesterday and pt/family state she was not tended to during night (minimal/no pain meds, left on bed pan, unsafe bed situation etc) and are refusing to return to [facility].   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 Treatments complicated by toxicities and infection.   Last treatment few weeks ago in April   MRI brain 5/13 showed stable enhancing lesions in the left occipital and parietal lobe with noted vasogenic edema.  Follows Dr. Castro for oncology, Dr Gamboa for Rad/onc, and follows palliative outpt   C/w decadron 3mg in the morning and 2mg in the evening   C/w Keppra 1000mg BID   C/w Bactrim -160mg MWF  Monitor mental status     HTN (hypertension)  Norvasc 10 Mg  IV Labetalol as needed for SBP > 180  Anemia  Recent Labs     05/29/25  0612 05/31/25  1305   HGB 9.4* 10.9*     Anemia of chronic disease.  Hemoglobin baseline 9-10.  Continue to monitor.  Cancer associated pain  Continue with home Gabapentin 200 in am and 300 at bedtime, Dilaudid 2 Mg every 4 hours as needed, acetaminophen 975mg every 6 hours as needed, robaxin prn, lidoderm patch  Ordered narcan   Insomnia  C/w Seroquel 25 mg qHS  Seroquel 12.5 prn for additional support  C/w Remeron 7.5 at bedtime   Optimize sleep hygiene  History of Pneumocystis jirovecii pneumonia  Continue Bactrim -160mg MWF  Hypothyroidism  Continue with home levothyroxine 50 mcg daily  History of stroke  R frontal subacute infarct, noted on imaging on 04/21/25  C/w aspirin & statin.      VTE Pharmacologic Prophylaxis: VTE Score: 7 High Risk (Score >/= 5) - Pharmacological DVT Prophylaxis Ordered: enoxaparin (Lovenox). Sequential Compression Devices Ordered.  Code Status: Prior level 3   Discussion with family: Patient declined call to .     Anticipated Length of Stay: Patient will be admitted on an observation basis with an anticipated length of stay of less than 2 midnights secondary to right flank pain.    History of Present Illness   Chief Complaint: right flank pain    Ayla Nye is a 74 y.o. female with a PMH of stroke  earlier in April, stage IV adenocarcinoma of lung mets to the brain, PE, HTN who presents with progressively worsening 9/10 dull ache right sided flank pain radiating to her abdomen and behind her leg that started yesterday.  She also noted for the past three days her urine changed from clear to brown intermittently.  ROS positive for RLQ abd pain, right sided flank pain, hematuria, and urinary urgency.     Review of Systems   Constitutional:  Negative for appetite change, chills and fever.   Respiratory:  Negative for chest tightness.    Cardiovascular:  Negative for chest pain, palpitations and leg swelling.   Gastrointestinal:  Positive for abdominal pain. Negative for blood in stool, constipation, diarrhea, nausea and vomiting.   Genitourinary:  Positive for flank pain, hematuria and urgency. Negative for difficulty urinating and dysuria.   Neurological:  Negative for dizziness, light-headedness and numbness.       Historical Information   Past Medical History[1]  Past Surgical History[2]  Social History[3]  E-Cigarette/Vaping    E-Cigarette Use Never User      E-Cigarette/Vaping Substances    Nicotine No     THC No     CBD No     Flavoring No     Other No     Unknown No      Family History[4]  Social History:  Marital Status:    Occupation: Retired  Patient Pre-hospital Living Situation: Home  Patient Pre-hospital Level of Mobility: Using walker recently since last adx  Patient Pre-hospital Diet Restrictions: None    Meds/Allergies   I have reviewed home medications with patient personally.  Prior to Admission medications    Medication Sig Start Date End Date Taking? Authorizing Provider   acetaminophen (TYLENOL) 500 mg tablet Take 2 tablets (1,000 mg total) by mouth 3 (three) times a day as needed for mild pain, headaches or fever 5/30/25   Ciaran Acuña MD   aluminum-magnesium hydroxide-simethicone (MAALOX) 1640-0380-261 mg/30 mL suspension Take 30 mL by mouth every 4 (four) hours as needed for  indigestion or heartburn 5/30/25   Claudine Leos PA-C   amLODIPine (NORVASC) 10 mg tablet Take 1 tablet (10 mg total) by mouth daily 5/31/25   Claudine Leos PA-C   aspirin 81 mg chewable tablet Chew 1 tablet (81 mg total) daily for 29 doses 4/25/25 5/24/25  Jaci Chu MD   atorvastatin (LIPITOR) 40 mg tablet Take 1 tablet (40 mg total) by mouth daily with dinner 4/25/25   Jaci Chu MD   dexamethasone (DECADRON) 2 mg tablet Take 1 tablet (2 mg total) by mouth daily with breakfast AND 1.5 tablets (3 mg total) daily with dinner. Discuss steroid adjustments with oncology, radiation oncology, and palliative care at next appointments or if concerns in interim. 5/30/25   Ciaran Acuña MD   enoxaparin (LOVENOX) 40 mg/0.4 mL Inject 0.4 mL (40 mg total) under the skin every 24 hours Stop at discretion of skilled nursing facility provider 5/30/25   Ciaran Acuña MD   gabapentin (NEURONTIN) 100 mg capsule Take 2 capsules (200 mg total) by mouth daily AND 4 capsules (400 mg total) daily at bedtime. 5/30/25   Claudine Leos PA-C   HYDROmorphone (DILAUDID) 2 mg tablet Take 1 tablet (2 mg total) by mouth every 6 (six) hours as needed for severe pain for up to 10 days Max Daily Amount: 8 mg 5/30/25 6/9/25  Claudine Leos PA-C   levETIRAcetam (KEPPRA) 1000 MG tablet TAKE 1 TABLET(1000 MG) BY MOUTH EVERY 12 HOURS 3/27/25   Rafy Angelo MD   levothyroxine 50 mcg tablet TAKE 1 TABLET(50 MCG) BY MOUTH DAILY EARLY MORNING 4/2/25   Rafy Angelo MD   lidocaine (LIDODERM) 5 % Apply 1 patch topically daily over 12 hours Remove & Discard patch within 12 hours or as directed by MD 5/31/25   Claudine Leos PA-C   methocarbamol (ROBAXIN) 500 mg tablet Take 0.5 tablets (250 mg total) by mouth every 6 (six) hours as needed for muscle spasms 5/15/25   Greg Upper sorbian, MD   mirtazapine (REMERON) 7.5 MG tablet Take 1 tablet (7.5 mg total) by mouth daily at bedtime 5/30/25   Claudine Leos PA-C   pantoprazole (PROTONIX) 40 mg  tablet Take 1 tablet (40 mg total) by mouth daily before breakfast 5/31/25   Claudine Loes PA-C   QUEtiapine (SEROquel) 25 mg tablet Take 1 tablet (25 mg total) by mouth daily at bedtime Stop at discretion of skilled nursing and primary care provider - try to wean off if possible 5/30/25 6/29/25  Ciaran Acuña MD   senna (SENOKOT) 8.6 mg Take 2 tablets (17.2 mg total) by mouth daily 5/31/25   Claudine Leos PA-C   sulfamethoxazole-trimethoprim (BACTRIM DS) 800-160 mg per tablet Take 1 tablet by mouth 3 (three) times a week for 24 doses 6/2/25 7/26/25  Claudine Leos PA-C   vitamin B-12 (VITAMIN B-12) 1,000 mcg tablet Take 1 tablet (1,000 mcg total) by mouth daily 9/14/23   ZULEYMA Boland     Allergies   Allergen Reactions    Doxycycline Headache    Keflex [Cephalexin] Rash    Neomycin-Polymyxin-Dexameth Eye Swelling       Objective :  Temp:  [98.2 °F (36.8 °C)] 98.2 °F (36.8 °C)  HR:  [80-86] 80  BP: (165-204)/() 165/88  Resp:  [14-18] 16  SpO2:  [91 %-93 %] 93 %  O2 Device: Nasal cannula  Nasal Cannula O2 Flow Rate (L/min):  [2 L/min] 2 L/min    Physical Exam  Vitals and nursing note reviewed.   Constitutional:       General: She is not in acute distress.     Appearance: Normal appearance. She is not ill-appearing or toxic-appearing.   HENT:      Mouth/Throat:      Mouth: Mucous membranes are dry.     Eyes:      General: No scleral icterus.        Right eye: No discharge.         Left eye: No discharge.      Conjunctiva/sclera: Conjunctivae normal.       Cardiovascular:      Rate and Rhythm: Normal rate and regular rhythm.      Pulses: Normal pulses.      Heart sounds: No murmur heard.  Pulmonary:      Effort: Pulmonary effort is normal. No respiratory distress.      Breath sounds: Normal breath sounds. No stridor. No wheezing, rhonchi or rales.   Abdominal:      General: There is no distension.      Palpations: Abdomen is soft.      Tenderness: There is abdominal tenderness (RLQ TTP). There  is no right CVA tenderness, left CVA tenderness, guarding or rebound.     Musculoskeletal:         General: No swelling.      Cervical back: Neck supple.      Right lower leg: No edema.      Left lower leg: No edema.     Skin:     General: Skin is warm and dry.      Capillary Refill: Capillary refill takes less than 2 seconds.      Coloration: Skin is not jaundiced or pale.     Neurological:      Mental Status: She is alert and oriented to person, place, and time. Mental status is at baseline.      Sensory: No sensory deficit.      Motor: Weakness (s/p stroke has left sided UE and LE weakness, LUE  0/5 muscle strength) present.     Psychiatric:         Mood and Affect: Mood normal.         Behavior: Behavior normal.        Lines/Drains:        Lab Results: I have reviewed the following results:  Results from last 7 days   Lab Units 05/31/25  1305   WBC Thousand/uL 16.14*   HEMOGLOBIN g/dL 10.9*   HEMATOCRIT % 34.7*   PLATELETS Thousands/uL 267   BANDS PCT % 4   LYMPHO PCT % 2*   MONO PCT % 6   EOS PCT % 0     Results from last 7 days   Lab Units 05/31/25  1305   SODIUM mmol/L 136   POTASSIUM mmol/L 4.3   CHLORIDE mmol/L 104   CO2 mmol/L 23   BUN mg/dL 29*   CREATININE mg/dL 0.84   ANION GAP mmol/L 9   CALCIUM mg/dL 9.4   ALBUMIN g/dL 3.5   TOTAL BILIRUBIN mg/dL 0.19*   ALK PHOS U/L 52   ALT U/L 12   AST U/L 9*   GLUCOSE RANDOM mg/dL 123             Lab Results   Component Value Date    HGBA1C 5.6 05/06/2025    HGBA1C 5.6 04/23/2025    HGBA1C 5.3 07/30/2024     Results from last 7 days   Lab Units 05/31/25  1305   PROCALCITONIN ng/ml 0.16       Imaging Results Review: I reviewed radiology reports from this admission including: CT abdomen/pelvis.  Other Study Results Review: No additional pertinent studies reviewed.    Administrative Statements     ** Please Note: This note has been constructed using a voice recognition system. **         [1]   Past Medical History:  Diagnosis Date    DILLON (acute kidney injury) (HCC)  2025    Allergic     Allergic to neomiacin and cephalexin    Arthritis     Cancer (HCC)     lung cancer    Cancer (HCC)     mets to brain    Cancer (HCC)     perianal mass    Cataract 2023    Due to have durgery 2024    Disease of thyroid gland     Essential thrombocytosis (HCC)     controlled with medication    History of transfusion     Hyperlipidemia     Hypertension     Mass in chest     Nodular goiter     Osteoporosis     Peripheral neuropathy     Pneumonia Oct 16, 2023    Also  2024    Psoriasis     SIRS (systemic inflammatory response syndrome) (HCC) 2024    Stroke (HCC)    [2]   Past Surgical History:  Procedure Laterality Date    APPENDECTOMY      BREAST CYST EXCISION Right     COLONOSCOPY  10/31/2018    DXA PROCEDURE (HISTORICAL)  2017    IR BIOPSY OTHER  2024    IR LUMBAR PUNCTURE  2024    MAMMO (HISTORICAL)      18    MAMMO NEEDLE LOCALIZATION RIGHT (ALL INC) Right 2009    SKIN LESION EXCISION      bridge of nose   [3]   Social History  Tobacco Use    Smoking status: Former     Current packs/day: 1.00     Average packs/day: 1 pack/day for 59.4 years (59.4 ttl pk-yrs)     Types: Cigarettes     Start date:      Passive exposure: Past    Smokeless tobacco: Never    Tobacco comments:     Quit    Vaping Use    Vaping status: Never Used   Substance and Sexual Activity    Alcohol use: Not Currently     Alcohol/week: 5.0 standard drinks of alcohol     Types: 5 Glasses of wine per week    Drug use: Never    Sexual activity: Not Currently     Partners: Male     Birth control/protection: Post-menopausal   [4]   Family History  Problem Relation Name Age of Onset    Stroke Mother Teresa     Depression Mother Teresa             Hypertension Mother Teresa     Hearing loss Mother Teresa     Tuberculosis Father      Cancer Brother Mauro         lung    Tuberculosis Brother Mauro     Coronary artery disease Brother Mauro      Hypertension Brother Mauro     Heart disease Brother Mauro     No Known Problems Daughter      No Known Problems Daughter      No Known Problems Maternal Grandmother      No Known Problems Maternal Grandfather      No Known Problems Paternal Grandmother      No Known Problems Paternal Grandfather      No Known Problems Maternal Aunt      No Known Problems Maternal Aunt      No Known Problems Maternal Aunt      No Known Problems Maternal Aunt      No Known Problems Paternal Aunt      Cancer Brother Tin         Throat cancer

## 2025-05-31 NOTE — ASSESSMENT & PLAN NOTE
Diagnosed in 07/2024, s/p radiation in 08/2024 and 01/2025. Treatments complicated by toxicities and infection.   Last treatment few weeks ago in April   MRI brain 5/13 showed stable enhancing lesions in the left occipital and parietal lobe with noted vasogenic edema.  Follows Dr. Castro for oncology, Dr Gamboa for Rad/onc, and follows palliative outpt   C/w decadron 3mg in the morning and 2mg in the evening   C/w Keppra 1000mg BID   C/w Bactrim -160mg MWF  Monitor mental status

## 2025-05-31 NOTE — ASSESSMENT & PLAN NOTE
C/w Seroquel 25 mg qHS  Seroquel 12.5 prn for additional support  C/w Remeron 7.5 at bedtime   Optimize sleep hygiene

## 2025-05-31 NOTE — ASSESSMENT & PLAN NOTE
Pt presented to [facility] yesterday and pt/family state she was not tended to during night (minimal/no pain meds, left on bed pan, unsafe bed situation etc) and are refusing to return to [facility].   Follow-up with case management for placement  PT/OT eval and treat

## 2025-05-31 NOTE — ED PROVIDER NOTES
Time reflects when diagnosis was documented in both MDM as applicable and the Disposition within this note       Time User Action Codes Description Comment    5/31/2025  5:27 PM Maynor Root Add [R10.9] Right flank pain           ED Disposition       ED Disposition   Admit    Condition   Stable    Date/Time   Sat May 31, 2025  5:27 PM    Comment   Case was discussed with [facility] and the patient's admission status was agreed to be Admission Status: observation status to the service of Dr. Bryan .               Assessment & Plan       Medical Decision Making  74yoF, pmhx metastatic renal adenoCA, presenting to ER with worsening rt flank pain. CBC, CMP near baseline. CT notable for progression of disease. No real acute findings. Rt slight hydro could have been transient secondary to clot from bleed from renal mass which has been chronic and correlates to upper max of pain last PM. Without UTI signs/sx, so UA sent to culture. Pt presenting to [facility] yesterday and pt and family state pt was not tended to during night (minimal/no pain meds, left on bed pan, unsafe bed situation etc) and are refusing to return to [facility]. No ER CM today. Accepted to [facility] for obs for further CM assistance, pain control.     Amount and/or Complexity of Data Reviewed  Independent Historian: caregiver  External Data Reviewed: notes.  Labs: ordered.  Radiology: ordered.    Risk  Prescription drug management.  Decision regarding hospitalization.             Medications   ketorolac (FOR EMS ONLY) (TORADOL) injection 30 mg (0 mg Does not apply Given to EMS 5/31/25 1133)   ondansetron (FOR EMS ONLY) (ZOFRAN) 4 mg/2 mL injection 4 mg (0 mg Does not apply Given to EMS 5/31/25 1133)   HYDROmorphone HCl (DILAUDID) injection 0.2 mg (0.2 mg Intravenous Given 5/31/25 1255)   sodium chloride 0.9 % bolus 500 mL (0 mL Intravenous Stopped 5/31/25 1500)   iohexol (OMNIPAQUE) 350 MG/ML injection (MULTI-DOSE) 80 mL (80 mL  Intravenous Given 5/31/25 1429)   HYDROmorphone (DILAUDID) injection 0.5 mg (0.5 mg Intravenous Given 5/31/25 1550)       ED Risk Strat Scores                    No data recorded                            History of Present Illness       Chief Complaint   Patient presents with    Abdominal Pain     Patient arrived via EMS from [facility], Hx of stroke with L sided defects. Last night started with right side of abdomen with radiation to the back and down right leg. Given 15mg torodol and 4mg zofan by EMS. Denies any urinary symptoms. States that the urine is intermittently dark colored.       Past Medical History[1]   Past Surgical History[2]   Family History[3]   Social History[4]   E-Cigarette/Vaping    E-Cigarette Use Never User       E-Cigarette/Vaping Substances    Nicotine No     THC No     CBD No     Flavoring No     Other No     Unknown No       I have reviewed and agree with the history as documented.     74yoF, pmhx per chart, presenting with worsening acute on chronic rt flank pain. Denies fever, chills, change in bowel or bladder, n/v/d. Daughters at bedside note [facility] with insufficient care, regularly markedly delayed with pain medications, did not accommodate mother with bed w/ railing given her weaknesses s/p stroke, with insufficient staffing to allow her toilet. Refuse to allow her to return to facility.       Abdominal Pain      Review of Systems   Gastrointestinal:  Positive for abdominal pain.   All other systems reviewed and are negative.          Objective       ED Triage Vitals   Temperature Pulse Blood Pressure Respirations SpO2 Patient Position - Orthostatic VS   05/31/25 1132 05/31/25 1137 05/31/25 1130 05/31/25 1130 05/31/25 1137 05/31/25 1130   98.2 °F (36.8 °C) 85 (!) 204/101 18 91 % Lying      Temp Source Heart Rate Source BP Location FiO2 (%) Pain Score    05/31/25 1132 05/31/25 1145 05/31/25 1130 -- 05/31/25 1130    Oral Monitor Right arm  8      Vitals       Date and Time Temp Pulse SpO2 Resp BP Pain Score FACES Pain Rating User   05/31/25 1550 -- 80 93 % 16 165/88 6 -- LA   05/31/25 1145 -- 86 92 % 14 180/98 -- -- LO   05/31/25 1137 -- 85 91 % -- -- -- -- LO   05/31/25 1132 98.2 °F (36.8 °C) -- -- -- -- -- --    05/31/25 1130 -- -- -- 18 204/101 8 -- LO            Physical Exam  Vitals and nursing note reviewed.   Constitutional:       General: She is not in acute distress.     Appearance: She is well-developed. She is ill-appearing.   HENT:      Head: Normocephalic and atraumatic.     Eyes:      Conjunctiva/sclera: Conjunctivae normal.       Cardiovascular:      Rate and Rhythm: Normal rate and regular rhythm.      Heart sounds: No murmur heard.  Pulmonary:      Effort: Pulmonary effort is normal. No respiratory distress.      Breath sounds: Normal breath sounds.   Abdominal:      Palpations: Abdomen is soft.      Tenderness: There is abdominal tenderness (mild) in the right upper quadrant and right lower quadrant.     Musculoskeletal:         General: No swelling.      Cervical back: Neck supple.     Skin:     General: Skin is warm and dry.      Capillary Refill: Capillary refill takes less than 2 seconds.     Neurological:      Mental Status: She is alert.     Psychiatric:         Mood and Affect: Mood normal.         Results Reviewed       Procedure Component Value Units Date/Time    Urine culture [[id number]]     Lab Status: No result Specimen: Urine, Clean Catch     RBC Morphology Reflex Test [[id number]] Collected: 05/31/25 1305    Lab Status: Final result Specimen: Blood from Hand, Left Updated: 05/31/25 1501    Procalcitonin [[id number]]  (Normal) Collected: 05/31/25 1305    Lab Status: Final result Specimen: Blood from Hand, Left Updated: 05/31/25 1448     Procalcitonin 0.16 ng/ml     CBC and differential [[id number]]  (Abnormal) Collected: 05/31/25 1305    Lab Status: Final result Specimen: Blood from Hand, Left Updated: 05/31/25 1413     WBC 16.14  Thousand/uL      RBC 3.86 Million/uL      Hemoglobin 10.9 g/dL      Hematocrit 34.7 %      MCV 90 fL      MCH 28.2 pg      MCHC 31.4 g/dL      RDW 16.6 %      MPV 9.2 fL      Platelets 267 Thousands/uL     Narrative:      This is an appended report.  These results have been appended to a previously verified report.    Manual Differential(PHLEBS Do Not Order) [[id number]]  (Abnormal) Collected: 05/31/25 1305    Lab Status: Final result Specimen: Blood from Hand, Left Updated: 05/31/25 1413     Segmented % 83 %      Bands % 4 %      Lymphocytes % 2 %      Monocytes % 6 %      Eosinophils % 0 %      Basophils % 0 %      Metamyelocytes % 3 %      Myelocytes % 2 %      Absolute Neutrophils 14.04 Thousand/uL      Absolute Lymphocytes 0.32 Thousand/uL      Absolute Monocytes 0.97 Thousand/uL      Absolute Eosinophils 0.00 Thousand/uL      Absolute Basophils 0.00 Thousand/uL      Absolute Metamyelocytes 0.48 Thousand/uL      Absolute Myelocytes 0.32 Thousand/uL      Total Counted --     RBC Morphology Present     Platelet Estimate Adequate     Large Platelet Present     Anisocytosis Present     Poikilocytes Present     Polychromasia Present     Tear Drop Cells Present    CMP [[id number]]  (Abnormal) Collected: 05/31/25 1305    Lab Status: Final result Specimen: Blood from Hand, Left Updated: 05/31/25 1342     Sodium 136 mmol/L      Potassium 4.3 mmol/L      Chloride 104 mmol/L      CO2 23 mmol/L      ANION GAP 9 mmol/L      BUN 29 mg/dL      Creatinine 0.84 mg/dL      Glucose 123 mg/dL      Calcium 9.4 mg/dL      AST 9 U/L      ALT 12 U/L      Alkaline Phosphatase 52 U/L      Total Protein 6.5 g/dL      Albumin 3.5 g/dL      Total Bilirubin 0.19 mg/dL      eGFR 68 ml/min/1.73sq m     Narrative:      National Kidney Disease Foundation guidelines for Chronic Kidney Disease (CKD):     Stage 1 with normal or high GFR (GFR > 90 mL/min/1.73 square meters)    Stage 2 Mild CKD (GFR = 60-89 mL/min/1.73 square meters)    Stage 3A  Moderate CKD (GFR = 45-59 mL/min/1.73 square meters)    Stage 3B Moderate CKD (GFR = 30-44 mL/min/1.73 square meters)    Stage 4 Severe CKD (GFR = 15-29 mL/min/1.73 square meters)    Stage 5 End Stage CKD (GFR <15 mL/min/1.73 square meters)  Note: GFR calculation is accurate only with a steady state creatinine    Lipase [[id number]]  (Normal) Collected: 05/31/25 1305    Lab Status: Final result Specimen: Blood from Hand, Left Updated: 05/31/25 1342     Lipase 37 u/L     Urine Microscopic [[id number]]  (Abnormal) Collected: 05/31/25 1243    Lab Status: Final result Specimen: Urine, Clean Catch Updated: 05/31/25 1308     RBC, UA 20-30 /hpf      WBC, UA 4-10 /hpf      Epithelial Cells Occasional /hpf      Bacteria, UA Occasional /hpf     UA w Reflex to Microscopic w Reflex to Culture [[id number]]  (Abnormal) Collected: 05/31/25 1243    Lab Status: Final result Specimen: Urine, Clean Catch Updated: 05/31/25 1257     Color, UA Light Yellow     Clarity, UA Clear     Specific Gravity, UA 1.022     pH, UA 5.5     Leukocytes, UA Trace     Nitrite, UA Negative     Protein, UA Trace mg/dl      Glucose, UA Negative mg/dl      Ketones, UA Negative mg/dl      Urobilinogen, UA <2.0 mg/dl      Bilirubin, UA Negative     Occult Blood, UA Large            CT abdomen pelvis with contrast   Final Interpretation by Jose Luis Lozano MD (05/31 7220)      1.  Rapid interval progression of the diffuse metastatic disease, including enlargement of the right renal mass. There is possible invasion of the right renal vein. Please see the body of the report for further description of the pertinent findings.   2.  There is new mild right hydroureteronephrosis, with slightly increased density within the right ureterovesicular junction. This may represent a small amount of blood products or a focal urothelial lesion.      3.  Please refer to the report body for description of other incidental, chronic and/or benign findings.         Workstation  performed: [id number]             Procedures    ED Medication and Procedure Management   Prior to Admission Medications   Prescriptions Last Dose Informant Patient Reported? Taking?   HYDROmorphone (DILAUDID) 2 mg tablet   No No   Sig: Take 1 tablet (2 mg total) by mouth every 6 (six) hours as needed for severe pain for up to 10 days Max Daily Amount: 8 mg   QUEtiapine (SEROquel) 25 mg tablet   No No   Sig: Take 1 tablet (25 mg total) by mouth daily at bedtime Stop at discretion of skilled nursing and primary care provider - try to wean off if possible   acetaminophen (TYLENOL) 500 mg tablet   No No   Sig: Take 2 tablets (1,000 mg total) by mouth 3 (three) times a day as needed for mild pain, headaches or fever   aluminum-magnesium hydroxide-simethicone (MAALOX) 0526-9579-657 mg/30 mL suspension   No No   Sig: Take 30 mL by mouth every 4 (four) hours as needed for indigestion or heartburn   amLODIPine (NORVASC) 10 mg tablet   No No   Sig: Take 1 tablet (10 mg total) by mouth daily   aspirin 81 mg chewable tablet   No No   Sig: Chew 1 tablet (81 mg total) daily for 29 doses   atorvastatin (LIPITOR) 40 mg tablet   No No   Sig: Take 1 tablet (40 mg total) by mouth daily with dinner   dexamethasone (DECADRON) 2 mg tablet   No No   Sig: Take 1 tablet (2 mg total) by mouth daily with breakfast AND 1.5 tablets (3 mg total) daily with dinner. Discuss steroid adjustments with oncology, radiation oncology, and palliative care at next appointments or if concerns in interim.   enoxaparin (LOVENOX) 40 mg/0.4 mL   No No   Sig: Inject 0.4 mL (40 mg total) under the skin every 24 hours Stop at discretion of skilled nursing facility provider   gabapentin (NEURONTIN) 100 mg capsule   No No   Sig: Take 2 capsules (200 mg total) by mouth daily AND 4 capsules (400 mg total) daily at bedtime.   levETIRAcetam (KEPPRA) 1000 MG tablet   No No   Sig: TAKE 1 TABLET(1000 MG) BY MOUTH EVERY 12 HOURS   levothyroxine 50 mcg tablet   No No   Sig:  TAKE 1 TABLET(50 MCG) BY MOUTH DAILY EARLY MORNING   lidocaine (LIDODERM) 5 %   No No   Sig: Apply 1 patch topically daily over 12 hours Remove & Discard patch within 12 hours or as directed by MD   methocarbamol (ROBAXIN) 500 mg tablet   No No   Sig: Take 0.5 tablets (250 mg total) by mouth every 6 (six) hours as needed for muscle spasms   mirtazapine (REMERON) 7.5 MG tablet   No No   Sig: Take 1 tablet (7.5 mg total) by mouth daily at bedtime   pantoprazole (PROTONIX) 40 mg tablet   No No   Sig: Take 1 tablet (40 mg total) by mouth daily before breakfast   senna (SENOKOT) 8.6 mg   No No   Sig: Take 2 tablets (17.2 mg total) by mouth daily   sulfamethoxazole-trimethoprim (BACTRIM DS) 800-160 mg per tablet   No No   Sig: Take 1 tablet by mouth 3 (three) times a week for 24 doses   vitamin B-12 (VITAMIN B-12) 1,000 mcg tablet  Self No No   Sig: Take 1 tablet (1,000 mcg total) by mouth daily      Facility-Administered Medications: None     Patient's Medications   Discharge Prescriptions    No medications on file     No discharge procedures on file.  ED SEPSIS DOCUMENTATION   Time reflects when diagnosis was documented in both MDM as applicable and the Disposition within this note       Time User Action Codes Description Comment    5/31/2025  5:27 PM Maynor Root Add [R10.9] Right flank pain                    [1]   Past Medical History:  Diagnosis Date    DILLON (acute kidney injury) (HCC) 02/14/2025    Allergic 2022    Allergic to neomiacin and cephalexin    Arthritis     Cancer (HCC)     lung cancer    Cancer (HCC)     mets to brain    Cancer (HCC)     perianal mass    Cataract Nov. 2023    Due to have durgery June 2024    Disease of thyroid gland     Essential thrombocytosis (HCC)     controlled with medication    History of transfusion     Hyperlipidemia 2015    Hypertension 2011    Mass in chest     Nodular goiter     Osteoporosis     Peripheral neuropathy     Pneumonia Oct 16, 2023    Also  Feb 2024    Psoriasis      SIRS (systemic inflammatory response syndrome) (HCC) 2024    Stroke (HCC)    [2]   Past Surgical History:  Procedure Laterality Date    APPENDECTOMY      BREAST CYST EXCISION Right     COLONOSCOPY  10/31/2018    DXA PROCEDURE (HISTORICAL)  2017    IR BIOPSY OTHER  2024    IR LUMBAR PUNCTURE  2024    MAMMO (HISTORICAL)      8-24-18    MAMMO NEEDLE LOCALIZATION RIGHT (ALL INC) Right 2009    SKIN LESION EXCISION      bridge of nose   [3]   Family History  Problem Relation Name Age of Onset    Stroke Mother Teresa     Depression Mother Teresa             Hypertension Mother Teresa     Hearing loss Mother Teresa     Tuberculosis Father      Cancer Brother Mauro         lung    Tuberculosis Brother Mauro     Coronary artery disease Brother Mauro     Hypertension Brother Mauro     Heart disease Brother Mauro     No Known Problems Daughter      No Known Problems Daughter      No Known Problems Maternal Grandmother      No Known Problems Maternal Grandfather      No Known Problems Paternal Grandmother      No Known Problems Paternal Grandfather      No Known Problems Maternal Aunt      No Known Problems Maternal Aunt      No Known Problems Maternal Aunt      No Known Problems Maternal Aunt      No Known Problems Paternal Aunt      Cancer Brother Tin         Throat cancer   [4]   Social History  Tobacco Use    Smoking status: Former     Current packs/day: 1.00     Average packs/day: 1 pack/day for 59.4 years (59.4 ttl pk-yrs)     Types: Cigarettes     Start date:      Passive exposure: Past    Smokeless tobacco: Never    Tobacco comments:     Quit    Vaping Use    Vaping status: Never Used   Substance Use Topics    Alcohol use: Not Currently     Alcohol/week: 5.0 standard drinks of alcohol     Types: 5 Glasses of wine per week    Drug use: Never        Maynor Root DO  25 1844

## 2025-05-31 NOTE — ASSESSMENT & PLAN NOTE
Continue with home Gabapentin 200 in am and 300 at bedtime, Dilaudid 2 Mg every 4 hours as needed, acetaminophen 975mg every 6 hours as needed, robaxin prn, lidoderm patch  Ordered narcan

## 2025-05-31 NOTE — ED NOTES
Daughters at bedside, do not want their mother going back to [facility])     Lynne Casanova, RN  05/31/25 3137

## 2025-05-31 NOTE — ASSESSMENT & PLAN NOTE
Recent Labs     05/29/25  0612 05/31/25  1305   HGB 9.4* 10.9*     Anemia of chronic disease.  Hemoglobin baseline 9-10.  Continue to monitor.

## 2025-06-01 ENCOUNTER — APPOINTMENT (INPATIENT)
Dept: CT IMAGING | Facility: HOSPITAL | Age: 75
End: 2025-06-01
Payer: MEDICARE

## 2025-06-01 LAB
ANION GAP SERPL CALCULATED.3IONS-SCNC: 8 MMOL/L (ref 4–13)
ANISOCYTOSIS BLD QL SMEAR: PRESENT
BASOPHILS # BLD MANUAL: 0 THOUSAND/UL (ref 0–0.1)
BASOPHILS NFR MAR MANUAL: 0 % (ref 0–1)
BUN SERPL-MCNC: 28 MG/DL (ref 5–25)
CALCIUM SERPL-MCNC: 9.4 MG/DL (ref 8.4–10.2)
CHLORIDE SERPL-SCNC: 102 MMOL/L (ref 96–108)
CO2 SERPL-SCNC: 25 MMOL/L (ref 21–32)
CREAT SERPL-MCNC: 0.98 MG/DL (ref 0.6–1.3)
DACRYOCYTES BLD QL SMEAR: PRESENT
EOSINOPHIL # BLD MANUAL: 0 THOUSAND/UL (ref 0–0.4)
EOSINOPHIL NFR BLD MANUAL: 0 % (ref 0–6)
ERYTHROCYTE [DISTWIDTH] IN BLOOD BY AUTOMATED COUNT: 16.7 % (ref 11.6–15.1)
GFR SERPL CREATININE-BSD FRML MDRD: 57 ML/MIN/1.73SQ M
GLUCOSE SERPL-MCNC: 104 MG/DL (ref 65–140)
HCT VFR BLD AUTO: 33.1 % (ref 34.8–46.1)
HGB BLD-MCNC: 10.1 G/DL (ref 11.5–15.4)
LYMPHOCYTES # BLD AUTO: 0.7 THOUSAND/UL (ref 0.6–4.47)
LYMPHOCYTES # BLD AUTO: 4 % (ref 14–44)
MAGNESIUM SERPL-MCNC: 2 MG/DL (ref 1.9–2.7)
MCH RBC QN AUTO: 27.7 PG (ref 26.8–34.3)
MCHC RBC AUTO-ENTMCNC: 30.5 G/DL (ref 31.4–37.4)
MCV RBC AUTO: 91 FL (ref 82–98)
METAMYELOCYTE ABSOLUTE CT: 0.52 THOUSAND/UL (ref 0–0.1)
METAMYELOCYTES NFR BLD MANUAL: 3 % (ref 0–1)
MONOCYTES # BLD AUTO: 0.52 THOUSAND/UL (ref 0–1.22)
MONOCYTES NFR BLD: 3 % (ref 4–12)
MYELOCYTE ABSOLUTE CT: 0.35 THOUSAND/UL (ref 0–0.1)
MYELOCYTES NFR BLD MANUAL: 2 % (ref 0–1)
NEUTROPHILS # BLD MANUAL: 15.34 THOUSAND/UL (ref 1.85–7.62)
NEUTS BAND NFR BLD MANUAL: 6 % (ref 0–8)
NEUTS SEG NFR BLD AUTO: 82 % (ref 43–75)
PLATELET # BLD AUTO: 269 THOUSANDS/UL (ref 149–390)
PLATELET BLD QL SMEAR: ADEQUATE
PMV BLD AUTO: 9.7 FL (ref 8.9–12.7)
POIKILOCYTOSIS BLD QL SMEAR: PRESENT
POLYCHROMASIA BLD QL SMEAR: PRESENT
POTASSIUM SERPL-SCNC: 5.1 MMOL/L (ref 3.5–5.3)
RBC # BLD AUTO: 3.65 MILLION/UL (ref 3.81–5.12)
RBC MORPH BLD: PRESENT
SODIUM SERPL-SCNC: 135 MMOL/L (ref 135–147)
WBC # BLD AUTO: 17.43 THOUSAND/UL (ref 4.31–10.16)

## 2025-06-01 PROCEDURE — 85027 COMPLETE CBC AUTOMATED: CPT

## 2025-06-01 PROCEDURE — 70450 CT HEAD/BRAIN W/O DYE: CPT

## 2025-06-01 PROCEDURE — 97163 PT EVAL HIGH COMPLEX 45 MIN: CPT

## 2025-06-01 PROCEDURE — 80048 BASIC METABOLIC PNL TOTAL CA: CPT

## 2025-06-01 PROCEDURE — 97110 THERAPEUTIC EXERCISES: CPT

## 2025-06-01 PROCEDURE — 85007 BL SMEAR W/DIFF WBC COUNT: CPT

## 2025-06-01 PROCEDURE — 83735 ASSAY OF MAGNESIUM: CPT

## 2025-06-01 PROCEDURE — 99232 SBSQ HOSP IP/OBS MODERATE 35: CPT | Performed by: INTERNAL MEDICINE

## 2025-06-01 RX ORDER — OXYCODONE HYDROCHLORIDE 5 MG/1
5 TABLET ORAL EVERY 4 HOURS PRN
Refills: 0 | Status: DISCONTINUED | OUTPATIENT
Start: 2025-06-01 | End: 2025-06-02

## 2025-06-01 RX ORDER — ACETAMINOPHEN 325 MG/1
975 TABLET ORAL EVERY 6 HOURS PRN
Status: DISCONTINUED | OUTPATIENT
Start: 2025-06-01 | End: 2025-06-01

## 2025-06-01 RX ORDER — HYDROMORPHONE HCL IN WATER/PF 6 MG/30 ML
0.2 PATIENT CONTROLLED ANALGESIA SYRINGE INTRAVENOUS
Status: DISCONTINUED | OUTPATIENT
Start: 2025-06-01 | End: 2025-06-07 | Stop reason: HOSPADM

## 2025-06-01 RX ORDER — HYDROMORPHONE HCL IN WATER/PF 6 MG/30 ML
0.2 PATIENT CONTROLLED ANALGESIA SYRINGE INTRAVENOUS EVERY 6 HOURS PRN
Status: DISCONTINUED | OUTPATIENT
Start: 2025-06-01 | End: 2025-06-01

## 2025-06-01 RX ORDER — POLYETHYLENE GLYCOL 3350 17 G/17G
17 POWDER, FOR SOLUTION ORAL DAILY
Status: DISCONTINUED | OUTPATIENT
Start: 2025-06-01 | End: 2025-06-07 | Stop reason: HOSPADM

## 2025-06-01 RX ORDER — OXYCODONE HYDROCHLORIDE 5 MG/1
5 TABLET ORAL EVERY 6 HOURS PRN
Refills: 0 | Status: DISCONTINUED | OUTPATIENT
Start: 2025-06-01 | End: 2025-06-01

## 2025-06-01 RX ORDER — ACETAMINOPHEN 325 MG/1
975 TABLET ORAL EVERY 6 HOURS PRN
Status: DISCONTINUED | OUTPATIENT
Start: 2025-06-01 | End: 2025-06-02

## 2025-06-01 RX ADMIN — ENOXAPARIN SODIUM 40 MG: 40 INJECTION SUBCUTANEOUS at 08:16

## 2025-06-01 RX ADMIN — DEXAMETHASONE 2 MG: 2 TABLET ORAL at 17:33

## 2025-06-01 RX ADMIN — MIRTAZAPINE 7.5 MG: 15 TABLET, FILM COATED ORAL at 20:44

## 2025-06-01 RX ADMIN — CYANOCOBALAMIN TAB 500 MCG 1000 MCG: 500 TAB at 08:17

## 2025-06-01 RX ADMIN — GABAPENTIN 300 MG: 300 CAPSULE ORAL at 21:49

## 2025-06-01 RX ADMIN — AMLODIPINE BESYLATE 10 MG: 10 TABLET ORAL at 08:16

## 2025-06-01 RX ADMIN — ATORVASTATIN CALCIUM 40 MG: 40 TABLET, FILM COATED ORAL at 17:32

## 2025-06-01 RX ADMIN — ACETAMINOPHEN 975 MG: 325 TABLET ORAL at 09:33

## 2025-06-01 RX ADMIN — POLYETHYLENE GLYCOL 3350 17 G: 17 POWDER, FOR SOLUTION ORAL at 12:06

## 2025-06-01 RX ADMIN — QUETIAPINE 25 MG: 25 TABLET ORAL at 21:49

## 2025-06-01 RX ADMIN — Medication 2.5 MG: at 20:44

## 2025-06-01 RX ADMIN — LEVOTHYROXINE SODIUM 50 MCG: 0.05 TABLET ORAL at 05:59

## 2025-06-01 RX ADMIN — ALUMINUM HYDROXIDE, MAGNESIUM HYDROXIDE, AND DIMETHICONE 30 ML: 200; 20; 200 SUSPENSION ORAL at 09:31

## 2025-06-01 RX ADMIN — SENNOSIDES 17.2 MG: 8.6 TABLET, FILM COATED ORAL at 08:16

## 2025-06-01 RX ADMIN — PANTOPRAZOLE SODIUM 40 MG: 40 TABLET, DELAYED RELEASE ORAL at 06:00

## 2025-06-01 RX ADMIN — LEVETIRACETAM 1000 MG: 500 TABLET, FILM COATED ORAL at 08:18

## 2025-06-01 RX ADMIN — DEXAMETHASONE 3 MG: 2 TABLET ORAL at 08:17

## 2025-06-01 RX ADMIN — GABAPENTIN 200 MG: 100 CAPSULE ORAL at 08:17

## 2025-06-01 RX ADMIN — ASPIRIN 81 MG: 81 TABLET, CHEWABLE ORAL at 08:18

## 2025-06-01 RX ADMIN — LEVETIRACETAM 1000 MG: 500 TABLET, FILM COATED ORAL at 20:44

## 2025-06-01 NOTE — PLAN OF CARE
Problem: PHYSICAL THERAPY ADULT  Goal: Performs mobility at highest level of function for planned discharge setting.  See evaluation for individualized goals.  Description: Treatment/Interventions: Functional transfer training, LE strengthening/ROM, Therapeutic exercise, Patient/family training, Equipment eval/education, Bed mobility, Gait training, Endurance training, Spoke to nursing  Equipment Recommended:  (WC and  unilateral vs B device)       See flowsheet documentation for full assessment, interventions and recommendations.  Note: Prognosis: Guarded  Problem List: Decreased strength, Decreased endurance, Impaired balance, Decreased mobility, Decreased coordination, Impaired vision (upright posture/tone)  Assessment: Pt is a 74 y.o. female who arrived at [facility] on 5/31/2025 w/R sided flank pain, rad to abdomen and behind her leg in pt w/ stage IV CA of lung w/ brain mets, recent CVA.   Order placed for PT.      Prior to this admission: [facility], patient needed max assist for bed mobility, max assist for sit to stand including stand pivot transfer, but substantial a 4 ambulation in the hallway with right knee block over 10 feet, wheelchair mobility between 80 and 105 feet.  At Pt's true baseline (Similar to PT eval 5/6//25), she was indep w/ mobility w/o device, including able to perform stairs.  Upon evaluation: Pt required max assist for rolling and supine to sit towards left side of bed. She needed between mod and max A of 1 for sit to stand and steppage transfers.     Pt's clinical presentation is currently unstable/unpredictable given the functional mobility deficits above, especially (but not limited to) weakness, decreased functional mobility tolerance, and impaired tone (but not pain during session), and combined with medical complications including abnormal WBCs, readmission to hospital, fear/retreat, and CT impression .  Pt IS NOT at her  Patient advised to use UV protection/sunglasses. mobility baseline nor recent LOF per rehab notes.       During this admission, pt MAY benefit from continued skilled inpatient PT in the acute care setting in order to address deficits as defined above to maximize function and mobility.      Recommendations:     From a PT standpoint, recommend next several sessions focus on continued mobility trials with use of wheelchair, LRAD (patient preferring BUE support of walker versus unilateral device), potential trials with LLE bracing at knee.     Did note that patient had palliative care consult ordered.  Barriers to Discharge: Decreased caregiver support, Inaccessible home environment     Rehab Resource Intensity Level, PT: II (Moderate Resource Intensity)    See flowsheet documentation for full assessment.

## 2025-06-01 NOTE — ASSESSMENT & PLAN NOTE
Continue with home Gabapentin 200 in am and 300 at bedtime, patient is on Narcan, methocarbamol, hydromorphone, oxycodone, MiraLAX, Tylenol

## 2025-06-01 NOTE — ASSESSMENT & PLAN NOTE
Pt presented to [facility] yesterday and pt/family state she was not tended to during night (minimal/no pain meds, left on bed pan, unsafe bed situation etc) and are refusing to return to [facility].   Follow-up with case management for placement  PT/OT eval and treat

## 2025-06-01 NOTE — PLAN OF CARE
Problem: PAIN - ADULT  Goal: Verbalizes/displays adequate comfort level or baseline comfort level  Description: Interventions:  - Encourage patient to monitor pain and request assistance  - Assess pain using appropriate pain scale  - Administer analgesics as ordered based on type and severity of pain and evaluate response  - Implement non-pharmacological measures as appropriate and evaluate response  - Consider cultural and social influences on pain and pain management  - Notify physician/advanced practitioner if interventions unsuccessful or patient reports new pain  - Educate patient/family on pain management process including their role and importance of  reporting pain   - Provide non-pharmacologic/complimentary pain relief interventions  Outcome: Progressing     Problem: INFECTION - ADULT  Goal: Absence or prevention of progression during hospitalization  Description: INTERVENTIONS:  - Assess and monitor for signs and symptoms of infection  - Monitor lab/diagnostic results  - Monitor all insertion sites, i.e. indwelling lines, tubes, and drains  - Monitor endotracheal if appropriate and nasal secretions for changes in amount and color  - [place] appropriate cooling/warming therapies per order  - Administer medications as ordered  - Instruct and encourage patient and family to use good hand hygiene technique  - Identify and instruct in appropriate isolation precautions for identified infection/condition  Outcome: Progressing  Goal: Absence of fever/infection during neutropenic period  Description: INTERVENTIONS:  - Monitor WBC  - Perform strict hand hygiene  - Limit to healthy visitors only  - No plants, dried, fresh or silk flowers with patterson in patient room  Outcome: Progressing     Problem: SAFETY ADULT  Goal: Patient will remain free of falls  Description: INTERVENTIONS:  - Educate patient/family on patient safety including physical limitations  - Instruct patient to call for assistance with activity   -  Consider consulting OT/PT to assist with strengthening/mobility based on AM PAC & JH-HLM score  - Consult OT/PT to assist with strengthening/mobility   - Keep Call bell within reach  - Keep bed low and locked with side rails adjusted as appropriate  - Keep care items and personal belongings within reach  - Initiate and maintain comfort rounds  - Make Fall Risk Sign visible to staff  - Offer Toileting every  Hours, in advance of need  - Initiate/Maintain alarm  - Obtain necessary fall risk management equipment:   - Apply yellow socks and bracelet for high fall risk patients  - Consider moving patient to room near nurses station  Outcome: Progressing  Goal: Maintain or return to baseline ADL function  Description: INTERVENTIONS:  -  Assess patient's ability to carry out ADLs; assess patient's baseline for ADL function and identify physical deficits which impact ability to perform ADLs (bathing, care of mouth/teeth, toileting, grooming, dressing, etc.)  - Assess/evaluate cause of self-care deficits   - Assess range of motion  - Assess patient's mobility; develop plan if impaired  - Assess patient's need for assistive devices and provide as appropriate  - Encourage maximum independence but intervene and supervise when necessary  - Involve family in performance of ADLs  - Assess for home care needs following discharge   - Consider OT consult to assist with ADL evaluation and planning for discharge  - Provide patient education as appropriate  - Monitor functional capacity and physical performance, use of AM PAC & JH-HLM   - Monitor gait, balance and fatigue with ambulation    Outcome: Progressing  Goal: Maintains/Returns to pre admission functional level  Description: INTERVENTIONS:  - Perform AM-PAC 6 Click Basic Mobility/ Daily Activity assessment daily.  - Set and communicate daily mobility goal to care team and patient/family/caregiver.   - Collaborate with rehabilitation services on mobility goals if consulted  -  Perform Range of Motion  times a day.  - Reposition patient every  hours.  - Dangle patient  times a day  - Stand patient  times a day  - Ambulate patient  times a day  - Out of bed to chair  times a day   - Out of bed for meals  times a day  - Out of bed for toileting  - Record patient progress and toleration of activity level   Outcome: Progressing     Problem: DISCHARGE PLANNING  Goal: Discharge to home or other facility with appropriate resources  Description: INTERVENTIONS:  - Identify barriers to discharge w/patient and caregiver  - Arrange for needed discharge resources and transportation as appropriate  - Identify discharge learning needs (meds, wound care, etc.)  - Arrange for interpretive services to assist at discharge as needed  - Refer to Case Management Department for coordinating discharge planning if the patient needs post-hospital services based on physician/advanced practitioner order or complex needs related to functional status, cognitive ability, or social support system  Outcome: Progressing     Problem: Knowledge Deficit  Goal: Patient/family/caregiver demonstrates understanding of disease process, treatment plan, medications, and discharge instructions  Description: Complete learning assessment and assess knowledge base.  Interventions:  - Provide teaching at level of understanding  - Provide teaching via preferred learning methods  Outcome: Progressing     Problem: INFECTION - ADULT  Goal: Absence or prevention of progression during hospitalization  Description: INTERVENTIONS:  - Assess and monitor for signs and symptoms of infection  - Monitor lab/diagnostic results  - Monitor all insertion sites, i.e. indwelling lines, tubes, and drains  - Monitor endotracheal if appropriate and nasal secretions for changes in amount and color  - [place] appropriate cooling/warming therapies per order  - Administer medications as ordered  - Instruct and encourage patient and family to use good hand  hygiene technique  - Identify and instruct in appropriate isolation precautions for identified infection/condition  Outcome: Progressing     Problem: SAFETY ADULT  Goal: Patient will remain free of falls  Description: INTERVENTIONS:  - Educate patient/family on patient safety including physical limitations  - Instruct patient to call for assistance with activity   - Consider consulting OT/PT to assist with strengthening/mobility based on AM PAC & JH-HLM score  - Consult OT/PT to assist with strengthening/mobility   - Keep Call bell within reach  - Keep bed low and locked with side rails adjusted as appropriate  - Keep care items and personal belongings within reach  - Initiate and maintain comfort rounds  - Make Fall Risk Sign visible to staff  - Offer Toileting every  Hours, in advance of need  - Initiate/Maintain alarm  - Obtain necessary fall risk management equipment  - Apply yellow socks and bracelet for high fall risk patients  - Consider moving patient to room near nurses station  Outcome: Progressing     Problem: DISCHARGE PLANNING  Goal: Discharge to home or other facility with appropriate resources  Description: INTERVENTIONS:  - Identify barriers to discharge w/patient and caregiver  - Arrange for needed discharge resources and transportation as appropriate  - Identify discharge learning needs (meds, wound care, etc.)  - Arrange for interpretive services to assist at discharge as needed  - Refer to Case Management Department for coordinating discharge planning if the patient needs post-hospital services based on physician/advanced practitioner order or complex needs related to functional status, cognitive ability, or social support system  Outcome: Progressing     Problem: Knowledge Deficit  Goal: Patient/family/caregiver demonstrates understanding of disease process, treatment plan, medications, and discharge instructions  Description: Complete learning assessment and assess knowledge  base.  Interventions:  - Provide teaching at level of understanding  - Provide teaching via preferred learning methods  Outcome: Progressing

## 2025-06-01 NOTE — PROGRESS NOTES
Progress Note - Hospitalist   Name: Ayla Nye 74 y.o. female I MRN: [id number]  Unit/Bed#: W -01 I Date of Admission: 5/31/2025   Date of Service: 6/1/2025 I Hospital Day: 0    Assessment & Plan  Flank pain  Progressively worsening 9/10 dull ache right sided flank pain radiating to her abdomen and behind her leg that started yesterday.  Past three days her urine changed from clear to brown intermittently.     ED course-received 0.7 Dilaudid, Toradol 30 Mg, Zofran, 500 cc NS bolus    VS: on presentation hypertensive 204/101, last BP around for 165/88, satting 93% on 2 L (baseline RA)  Labs: WBC elevated at 16 but seems like this is her baseline in setting of steroid use, hemoglobin stable at 10.9, CMP pretty unremarkable, lipase and Pro-Du WNL  CT abd/pelv:  indicated progression of disease---diffuse metastatic disease, including enlargement of the right renal mass. There is possible invasion of the right renal vein. new mild right hydroureteronephrosis, with slightly increased density within the right ureterovesicular junction which may be blood vs focal lesion.  UA: trace leukocytes and large occult blood, 20-30 RBC, 4-10 WBC with occasional bacteria    Assessment: This is a 74-year-old female with currently 6/10 right-sided flank pain that is slowly becoming sharp in nature.  Unlikely bacterial etiology.  Symptoms likely due to disease progression.    Plan:  Monitor off antibiotics  Follow-up on urine culture  Supportive care   Pain regimen in place as this is likely due to progression of metastatic disease   If pain continues, consider palliative care consult  Encounter for screening involving social determinants of health (SDoH)  Pt presented to [facility] yesterday and pt/family state she was not tended to during night (minimal/no pain meds, left on bed pan, unsafe bed situation etc) and are refusing to return to [facility].   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 Treatments complicated by toxicities and infection.   Last treatment few weeks ago in April   MRI brain 5/13 showed stable enhancing lesions in the left occipital and parietal lobe with noted vasogenic edema.  Follows Dr. Castro for oncology, Dr Gamboa for Rad/onc, and follows palliative outpt   C/w decadron 3mg in the morning and 2mg in the evening   C/w Keppra 1000mg BID   C/w Bactrim -160mg MWF  Monitor mental status     HTN (hypertension)  Norvasc 10 Mg  IV Labetalol as needed for SBP > 180  Anemia  Recent Labs     05/31/25  1305 06/01/25  0557   HGB 10.9* 10.1*     Anemia of chronic disease.  Hemoglobin baseline 9-10.  Continue to monitor.  Cancer associated pain  Continue with home Gabapentin 200 in am and 300 at bedtime, patient is on Narcan, methocarbamol, hydromorphone, oxycodone, MiraLAX, Tylenol  Insomnia  C/w Seroquel 25 mg qHS  Seroquel 12.5 prn for additional support  C/w Remeron 7.5 at bedtime   Optimize sleep hygiene  History of Pneumocystis jirovecii pneumonia  Continue Bactrim -160mg MWF  Hypothyroidism  Continue with home levothyroxine 50 mcg daily  History of stroke  R frontal subacute infarct, noted on imaging on 04/21/25  C/w aspirin & statin.    VTE Pharmacologic Prophylaxis: VTE Score: 7 High Risk (Score >/= 5) - Pharmacological DVT Prophylaxis Ordered: enoxaparin (Lovenox). Sequential Compression Devices Ordered.    Mobility:   Basic Mobility Inpatient Raw Score: 13  JH-HLM Goal: 4: Move to chair/commode  JH-HLM Achieved: 4: Move to chair/commode  JH-HLM Goal NOT achieved. Continue with multidisciplinary rounding and encourage appropriate mobility to improve upon JH-HLM goals.    Patient Centered Rounds: I performed bedside rounds with nursing staff today.   Discussions with Specialists or Other Care Team Provider: Palliative  care, heme-onc    Education and Discussions with Family / Patient: Patient declined call to .     Current Length of Stay: 0 day(s)  Current Patient Status: Observation   Certification Statement: The patient will continue to require additional inpatient hospital stay due to discussion about goals of care, abdominal pain management, heme-onc management for metastasis in the  tract  Discharge Plan: Anticipate discharge in 24-48 hrs to discharge location to be determined pending rehab evaluations.    Code Status: Level 3 - DNAR and DNI    Subjective   Patient still has abdominal pain but it has been improved.  No significant overnight events    Objective :  Temp:  [97.6 °F (36.4 °C)-98.2 °F (36.8 °C)] 98.1 °F (36.7 °C)  HR:  [80-98] 87  BP: (126-165)/(86-98) 126/86  Resp:  [16-20] 18  SpO2:  [88 %-93 %] 88 %  O2 Device: Nasal cannula  Nasal Cannula O2 Flow Rate (L/min):  [2 L/min] 2 L/min    Body mass index is 21.77 kg/m².     Input and Output Summary (last 24 hours):     Intake/Output Summary (Last 24 hours) at 6/1/2025 1322  Last data filed at 6/1/2025 1300  Gross per 24 hour   Intake 1090 ml   Output 0 ml   Net 1090 ml       Physical Exam  Vitals and nursing note reviewed.   Constitutional:       General: She is not in acute distress.     Appearance: Normal appearance. She is not ill-appearing, toxic-appearing or diaphoretic.   HENT:      Mouth/Throat:      Mouth: Mucous membranes are dry.     Eyes:      General: No scleral icterus.        Right eye: No discharge.         Left eye: No discharge.      Conjunctiva/sclera: Conjunctivae normal.       Cardiovascular:      Rate and Rhythm: Normal rate and regular rhythm.      Pulses: Normal pulses.      Heart sounds: No murmur heard.  Pulmonary:      Effort: Pulmonary effort is normal. No respiratory distress.      Breath sounds: Normal breath sounds. No stridor. No wheezing, rhonchi or rales.   Abdominal:      General: There is no distension.       Palpations: Abdomen is soft. There is no mass.      Tenderness: There is abdominal tenderness (RLQ TTP). There is no right CVA tenderness, left CVA tenderness, guarding or rebound.      Hernia: No hernia is present.     Musculoskeletal:         General: No swelling.      Cervical back: Neck supple.      Right lower leg: No edema.      Left lower leg: No edema.     Skin:     General: Skin is warm and dry.      Capillary Refill: Capillary refill takes less than 2 seconds.      Coloration: Skin is not jaundiced or pale.     Neurological:      General: No focal deficit present.      Mental Status: She is alert and oriented to person, place, and time. Mental status is at baseline.      Sensory: No sensory deficit.      Motor: Weakness (s/p stroke has left sided UE and LE weakness, LUE  0/5 muscle strength) present.     Psychiatric:         Mood and Affect: Mood normal.         Behavior: Behavior normal.         Thought Content: Thought content normal.         Judgment: Judgment normal.           Lines/Drains:              Lab Results: I have reviewed the following results:   Results from last 7 days   Lab Units 06/01/25  0557   WBC Thousand/uL 17.43*   HEMOGLOBIN g/dL 10.1*   HEMATOCRIT % 33.1*   PLATELETS Thousands/uL 269   BANDS PCT % 6   LYMPHO PCT % 4*   MONO PCT % 3*   EOS PCT % 0     Results from last 7 days   Lab Units 06/01/25  0557 05/31/25  1305   SODIUM mmol/L 135 136   POTASSIUM mmol/L 5.1 4.3   CHLORIDE mmol/L 102 104   CO2 mmol/L 25 23   BUN mg/dL 28* 29*   CREATININE mg/dL 0.98 0.84   ANION GAP mmol/L 8 9   CALCIUM mg/dL 9.4 9.4   ALBUMIN g/dL  --  3.5   TOTAL BILIRUBIN mg/dL  --  0.19*   ALK PHOS U/L  --  52   ALT U/L  --  12   AST U/L  --  9*   GLUCOSE RANDOM mg/dL 104 123                 Results from last 7 days   Lab Units 05/31/25  1305   PROCALCITONIN ng/ml 0.16       Recent Cultures (last 7 days):               Last 24 Hours Medication List:     Current Facility-Administered Medications:      acetaminophen (TYLENOL) tablet 975 mg, Q6H PRN    aluminum-magnesium hydroxide-simethicone (MAALOX) oral suspension 30 mL, Q4H PRN    amLODIPine (NORVASC) tablet 10 mg, Daily    aspirin chewable tablet 81 mg, Daily    atorvastatin (LIPITOR) tablet 40 mg, Daily With Dinner    cyanocobalamin (VITAMIN B-12) tablet 1,000 mcg, Daily    dexamethasone (DECADRON) tablet 2 mg, Daily **AND** dexamethasone (DECADRON) tablet 3 mg, Daily    enoxaparin (LOVENOX) subcutaneous injection 40 mg, Daily    gabapentin (NEURONTIN) capsule 200 mg, Daily **AND** gabapentin (NEURONTIN) capsule 300 mg, HS    HYDROmorphone HCl (DILAUDID) injection 0.2 mg, Q3H PRN    labetalol (NORMODYNE) injection 10 mg, Q6H PRN    levETIRAcetam (KEPPRA) tablet 1,000 mg, BID    levothyroxine tablet 50 mcg, Early Morning    lidocaine (LIDODERM) 5 % patch 1 patch, Daily    methocarbamol (ROBAXIN) tablet 250 mg, Q6H PRN    mirtazapine (REMERON) tablet 7.5 mg, HS    naloxone (NARCAN) intranasal 2 mg, Daily PRN    oxyCODONE (ROXICODONE) IR tablet 5 mg, Q4H PRN    oxyCODONE (ROXICODONE) split tablet 2.5 mg, Q4H PRN    pantoprazole (PROTONIX) EC tablet 40 mg, Daily Before Breakfast    polyethylene glycol (MIRALAX) packet 17 g, Daily    QUEtiapine (SEROquel) tablet 12.5 mg, Daily PRN    QUEtiapine (SEROquel) tablet 25 mg, HS    senna (SENOKOT) tablet 17.2 mg, Daily    [START ON 6/2/2025] sulfamethoxazole-trimethoprim (BACTRIM DS) 800-160 mg per tablet 1 tablet, Once per day on Monday Wednesday Friday    Administrative Statements   Today, Patient Was Seen By: Bro Howard MD      **Please Note: This note may have been constructed using a voice recognition system.**

## 2025-06-01 NOTE — PHYSICAL THERAPY NOTE
PHYSICAL THERAPY EVALUATION  NAME:  Ayla Nye  DATE: 06/01/25    AGE:   74 y.o.  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)    Hypothyroidism    Encounter for screening involving social determinants of health (SDoH)    Cancer associated pain    Anemia    History of Pneumocystis jirovecii pneumonia    History of stroke    Flank pain      Vitals:    05/31/25 2131 06/01/25 0720 06/01/25 1520 06/01/25 1623   BP: 162/98 126/86 113/68    BP Location:       Pulse: 98 87 91 85   Resp: 20 18 17    Temp: 98.2 °F (36.8 °C) 98.1 °F (36.7 °C) 98.4 °F (36.9 °C)    TempSrc:       SpO2: 91% (!) 88% 91% 93%   Weight:       Height:           Length Of Stay: 0  Performed at least 2 patient identifiers during session: Name, Birthday, and Epic photo  PHYSICAL THERAPY EVALUATION :    06/01/25 1625   PT Last Visit   PT Visit Date 06/01/25   Note Type   Note type Evaluation   Pain Assessment   Pain Assessment Tool 0-10   Pain Score No Pain   Hospital Pain Intervention(s) Repositioned;Emotional support;Ambulation/increased activity   Multiple Pain Sites No   Restrictions/Precautions   Weight Bearing Precautions Per Order No   Braces or Orthoses   (@ rehab: Left arm more flacid so used sling for shoulder and arm protection, but not currently on Pt)   Other Precautions Chair Alarm;Bed Alarm;Cognitive;Fall Risk;Visual impairment   Home Living   Type of Home House   Home Layout Two level  (no first floor bathroom-has commode, 6 HARRY)   Home Equipment   (none used Prior to last admission, but pt used WC and walker @ rehab)   Additional Comments Pt reports that they may plan to get a hospital bed in future   Prior Function   Level of [place] Independent with functional mobility  (true baseline)   Lives With (S)  Alone   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   Vocational Retired   General   Additional Pertinent History CT abd/pelv:   indicated progression of disease---diffuse metastatic disease, including enlargement of the right renal mass. There is possible invasion of the right renal vein. new mild right hydroureteronephrosis, with slightly increased density within the right ureterovesicular junction which may be blood vs focal lesion.   Family/Caregiver Present No   Cognition   Arousal/Participation Alert   Attention Attends with cues to redirect   Memory Within functional limits   Following Commands Follows one step commands without difficulty  (w/ MMT)   Subjective   Subjective Pt pleasant and cooperative, agreeable to participate. Denies lightheadedness but reports feeling unstable when sitting on EOB   RUE Assessment   RUE Assessment WFL   LUE Assessment   LUE Assessment   (slight movement GE @ fingers/hand, full AROM @ L elbow in GE position. full rom in GE position for shoulder ER, but limited AG movement. Not TTP @ L shoulder)   Strength LLE   L Hip Flexion 2+/5   L Hip Extension   (+glut contraction)   L Hip ABduction   (1+)   L Hip ADduction 2-/5   L Knee Extension 2+/5   L Ankle Dorsiflexion 1/5   Tone LLE   LLE Tone Hypotonic   Vision-Basic Assessment   Current Vision   (self reported limited peripheral vision R field.)   Coordination   Movements are Fluid and Coordinated 0   Coordination and Movement Description hypotonic LUE   Light Touch   RLE Light Touch Grossly intact   LLE Light Touch Grossly intact   Bed Mobility   Rolling L 2  Maximal assistance   Additional items Assist x 1;Increased time required;LE management;Verbal cues;Bedrails;HOB elevated   Supine to Sit 2  Maximal assistance   Additional items Assist x 1;Bedrails;Increased time required;LE management;Verbal cues   Sit to Supine   (NT as pt OOB at end of session)   Transfers   Sit to Stand 2  Maximal assistance   Additional items Assist x 1;Increased time required;Verbal cues;Bedrails   Stand to Sit 3  Moderate assistance   Additional items Assist x 1;Increased time  required;Verbal cues;Armrests   Stand pivot 2  Maximal assistance   Additional items Assist x 1;Armrests;Increased time required;Verbal cues  (steppage transfer from bed to recliner, L knee blocking used, pad as sling @ buttocks.)   Ambulation/Elevation   Gait pattern (S)  Not tested  (due to fatigue, limited standing tolerance time/balance, and limited postural tone)   Balance   Static Sitting Poor +   Static Standing Poor -   Ambulatory   (limited upright postural tone)   Endurance Deficit   Endurance Deficit Yes   Endurance Deficit Description limited standing and unsupported sitting tolerance   Activity Tolerance   Activity Tolerance Patient limited by fatigue   Nurse Made Aware spoke to RN prior to session, PCA during and post session   Assessment:   Pt is a 74 y.o. female who [facility] at [facility] on 5/[facility]/2025 w/R sided flank pain, rad to abdomen and behind her leg in pt w/ stage IV CA of lung w/ brain mets, recent CVA.   Order placed for PT.      Prior to this admission: Per last PT note at [facility] on 5/29/2025, patient needed max assist for bed mobility, max assist for sit to stand including stand pivot transfer, but substantial a 4 ambulation in the hallway with right knee block over 10 feet, wheelchair mobility between 80 and 105 feet.  At Pt's true baseline (Similar to PT jonyal 5/6//25), she was indep w/ mobility w/o device, including able to perform stairs.  Upon evaluation: Pt required max assist for rolling and supine to sit towards left side of bed. She needed between mod and max A of 1 for sit to stand and steppage transfers.     Pt's clinical presentation is currently unstable/unpredictable given the functional mobility deficits above, especially (but not limited to) weakness, decreased functional mobility tolerance, and impaired tone (but not pain during session), and combined with medical complications including abnormal WBCs, readmission to hospital, fear/retreat, and CT  impression.  Pt IS NOT at her mobility baseline nor recent LOF per rehab notes.       During this admission, pt MAY benefit from continued skilled inpatient PT in the acute care setting in order to address deficits as defined above to maximize function and mobility.      Recommendations:    From a PT standpoint, recommend next several sessions focus on continued mobility trials with use of wheelchair, LRAD (patient preferring BUE support of walker versus unilateral device), potential trials with LLE bracing at knee.    Did note that patient had palliative care consult ordered.   Prognosis Guarded   Problem List Decreased strength;Decreased endurance;Impaired balance;Decreased mobility;Decreased coordination;Impaired vision  (upright posture/tone)   Barriers to Discharge Decreased caregiver support;Inaccessible home environment   Goals   Patient Goals to get to my home and be able to walk again   STG Expiration Date 06/11/25   Short Term Goal #1 Goals: Pt will: Perform rolling  and supine<>sit bed mobility tasks with no more than min A to prepare for transfers and reposition in bed. Perform sit<>stand and pivot transfers with no more than min A to decrease burden of care. Perform ambulation with LRAD and possible bracing for 10' with no more than min A to promote proper use of assistive device and get back to pt's goal of ambulation   Propel wheelchair for 50 feet with no more than min assist as alternative to ambulation.  Assess stairs in the future and set goals to promote patient's return to home environment with steps to enter   PT Treatment Day 1   Plan   Treatment/Interventions Functional transfer training;LE strengthening/ROM;Therapeutic exercise;Patient/family training;Equipment eval/education;Bed mobility;Gait training;Endurance training;Spoke to nursing   PT Frequency 3-5x/wk   Discharge Recommendation   Rehab Resource Intensity Level, PT II (Moderate Resource Intensity)   Equipment Recommended   (WC and   unilateral vs B device)   AM-PAC Basic Mobility Inpatient   Turning in Flat Bed Without Bedrails 2   Lying on Back to Sitting on Edge of Flat Bed Without Bedrails 2   Moving Bed to Chair 2   Standing Up From Chair Using Arms 2   Walk in Room 1   Climb 3-5 Stairs With Railing 1   Basic Mobility Inpatient Raw Score 10   Turning Head Towards Sound 4   Follow Simple Instructions 3   Low Function Basic Mobility Raw Score  17   Low Function Basic Mobility Standardized Score  27.46   [facility] Highest Level Of Mobility   -HL Goal 4: Move to chair/commode   -HLM Achieved 4: Move to chair/commode   Additional Treatment Session   Start Time 1642   End Time 1655   Treatment Assessment With UE support at bed rail, patient needed less assist to perform sit to stand activity with LLE blocking.  Able to perform standing tolerance.  However, on both performances she needed physical assistance to perform anterior and posterior scooting while either supported or unsupported in the recliner.  Patient benefits from rehab wedge support to both maintain upright sitting balance as well as LUE positioning.  Skilled PT recommended to progress patient towards treatment goals.   Equipment Use none   Additional Treatment Day 1   Exercises   Neuro re-ed Patient performed additional treatment including standing tolerance time, unsupported sitting balance activities in recliner.  Patient performed sit to stand with RUE support at bed railing to pull, needing only min assist to complete upright standing tolerance for about 45 seconds with upright posture for majority of the time.  She requires max assist for anterior and posterior weight shifting both supported and unsupported x 3 reps .  Eventually patient needed dependent assist for repositioning to the back of the chair.  Wedges placed at LUE for approximation as well as right trunk to minimize trunk lean.  Footstool placed under feet to promote 90-90.  Care coordination with nursing  "for ease of mobility back to bed  recommending mobility towards right side, with L knee block   End of Consult   Patient Position at End of Consult All needs within reach;Bed/Chair alarm activated;Bedside chair   (Please find full objective findings from PT assessment regarding body systems outlined above).       The patient's -Located within [facility] Basic Mobility Inpatient Short Form Raw Score is 10. A Raw score of less than or equal to 16 suggests the patient may benefit from discharge to post-acute rehabilitation services, which DOES coincide with CURRENT above PT recommendations.     However please refer to therapist recommendation for discharge planning given other factors that may influence destination.     Adapted from Kj HOWE, Gerry J, Gerardo J, Damien PARTIDA. Association of -Located within [facility] “6-Clicks” Basic Mobility and Daily Activity Scores With Discharge Destination. Physical Therapy, 2021;101:1-9. DOI: [email or web address]    Portions of the record may have been created with voice recognition software.  Occasional wrong word or \"sound a like\" substitutions may have occurred due to the inherent limitations of voice recognition software.  Read the chart carefully and recognize, using context, where substitutions have occurred    "

## 2025-06-01 NOTE — CASE MANAGEMENT
Case Management Discharge Planning Note    Patient name Ayla Nye  Location W /W -01 MRN [id number]  : 1950 Date 2025       Current Admission Date: 2025  Current Admission Diagnosis:Flank pain   Patient Active Problem List    Diagnosis Date Noted    Flank pain 2025    Hematuria 2025    At risk for venous thromboembolism (VTE) 2025    Fall 05/15/2025    Impaired mobility and ADLs 05/15/2025    Vasogenic brain edema (HCC) 2025    Abnormal CT of the chest 2025    Stroke-like symptoms 2025    History of stroke 2025    NSVT (nonsustained ventricular tachycardia) (HCC) 2025    Left-sided weakness 2025    Right loin pain 2025    Complication of chemotherapy/immunotherapy 02/15/2025    GERD (gastroesophageal reflux disease) 2025    Epistaxis 2025    History of Pneumocystis jirovecii pneumonia 2025    IgG deficiency (HCC) 2025    Right kidney mass 2025    History of pulmonary embolism 2024    Dyspnea on exertion 2024    Imbalance 2024    Hyponatremia 2024    Leukocytosis 10/27/2024    Anemia 10/25/2024    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    Palliative care patient 2024    Cancer associated pain 2024    Encounter for screening involving social determinants of health (SDoH) 2024    Right hip pain 09/10/2024    Altered mental status 2024    Hypothyroidism 2024    Abnormal imaging of central nervous system 2024    Acute metabolic encephalopathy 2024    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) 2024    Brain mass 2024    Metastatic cancer to brain (HCC) 2024    Dehydration 2024    Moderate protein-calorie malnutrition (HCC) 2024    Bilateral leg pain 2024    Insomnia 2024    Metastatic adenocarcinoma (HCC) 2024    Age-related osteoporosis  without current pathological fracture 11/13/2023    Encounter for monitoring of hydroxyurea therapy 05/03/2023    JAK2 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 05/03/2023    HTN (hypertension) 08/10/2022    Mixed hyperlipidemia 08/10/2022    Essential thrombocytosis (HCC) 07/02/2020    TB lung, 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    Psoriatic arthritis (HCC) 09/10/2019      LOS (days): 0  Geometric Mean LOS (GMLOS) (days):   Days to GMLOS:     OBJECTIVE:            Current admission status: Observation   Preferred Pharmacy:   [id number] - [place], [place] - [address] [facility]  [address] [facility] [address]  Phone: [phone] Fax: [phone]    [facility] - [place] - [address]  [address]  [facility] [address]  Phone: [phone] Fax: [phone]    Primary Care Provider: Rafy Angelo MD    Primary Insurance: MEDICARE  Secondary Insurance: [facility]    DISCHARGE DETAILS:     CM met with both daughters (Sue and Juliana) to review plans for DC.  They do not want pt to return to [facility] for rehab.  They would like for pt to go to another rehab facility.  They are not happy with the care at [facility] and are concerned pt's pain is not being managed.  CM suggested waiting until PT OT see pt and then referrals could be made to other facilities for rehab.  They stated they are going to look at several facilities today and are hoping to find a facility that they are happy with.  CM explained they would have to have a bed available and be willing to accept pt however that will be determined once referrals have been made.  Daughters voiced understanding.      Daughters are concerned they have not spoken with an Oncologist in quite some time and are hoping they would be able to see pt while she is here.  CM will ask the doctor if this is possible as pt is here with cancer related pain.  CM also asked about a Palliative consult.  CM will continue to follow to assist  with needs and planning.  [place] rehab referrals to be made once PT OT have evaluated pt.

## 2025-06-01 NOTE — ASSESSMENT & PLAN NOTE
Recent Labs     05/31/25  1305 06/01/25  0557   HGB 10.9* 10.1*     Anemia of chronic disease.  Hemoglobin baseline 9-10.  Continue to monitor.

## 2025-06-02 ENCOUNTER — TELEPHONE (OUTPATIENT)
Age: 75
End: 2025-06-02

## 2025-06-02 ENCOUNTER — HOME CARE VISIT (OUTPATIENT)
Dept: HOME HEALTH SERVICES | Facility: HOME HEALTHCARE | Age: 75
End: 2025-06-02

## 2025-06-02 LAB
ANION GAP SERPL CALCULATED.3IONS-SCNC: 8 MMOL/L (ref 4–13)
ANISOCYTOSIS BLD QL SMEAR: PRESENT
BASOPHILS # BLD MANUAL: 0 THOUSAND/UL (ref 0–0.1)
BASOPHILS NFR MAR MANUAL: 0 % (ref 0–1)
BUN SERPL-MCNC: 40 MG/DL (ref 5–25)
CALCIUM SERPL-MCNC: 8.5 MG/DL (ref 8.4–10.2)
CHLORIDE SERPL-SCNC: 104 MMOL/L (ref 96–108)
CO2 SERPL-SCNC: 25 MMOL/L (ref 21–32)
CREAT SERPL-MCNC: 0.95 MG/DL (ref 0.6–1.3)
EOSINOPHIL # BLD MANUAL: 0 THOUSAND/UL (ref 0–0.4)
EOSINOPHIL NFR BLD MANUAL: 0 % (ref 0–6)
ERYTHROCYTE [DISTWIDTH] IN BLOOD BY AUTOMATED COUNT: 16.9 % (ref 11.6–15.1)
GFR SERPL CREATININE-BSD FRML MDRD: 59 ML/MIN/1.73SQ M
GLUCOSE SERPL-MCNC: 127 MG/DL (ref 65–140)
HCT VFR BLD AUTO: 30.6 % (ref 34.8–46.1)
HGB BLD-MCNC: 9.3 G/DL (ref 11.5–15.4)
LEVETIRACETAM SERPL-MCNC: 89.1 UG/ML (ref 12–46)
LYMPHOCYTES # BLD AUTO: 0.15 THOUSAND/UL (ref 0.6–4.47)
LYMPHOCYTES # BLD AUTO: 1 % (ref 14–44)
MCH RBC QN AUTO: 28.2 PG (ref 26.8–34.3)
MCHC RBC AUTO-ENTMCNC: 30.4 G/DL (ref 31.4–37.4)
MCV RBC AUTO: 93 FL (ref 82–98)
METAMYELOCYTE ABSOLUTE CT: 0.15 THOUSAND/UL (ref 0–0.1)
METAMYELOCYTES NFR BLD MANUAL: 1 % (ref 0–1)
MONOCYTES # BLD AUTO: 0.61 THOUSAND/UL (ref 0–1.22)
MONOCYTES NFR BLD: 4 % (ref 4–12)
MYELOCYTE ABSOLUTE CT: 0.61 THOUSAND/UL (ref 0–0.1)
MYELOCYTES NFR BLD MANUAL: 4 % (ref 0–1)
NEUTROPHILS # BLD MANUAL: 13.62 THOUSAND/UL (ref 1.85–7.62)
NEUTS SEG NFR BLD AUTO: 90 % (ref 43–75)
PLATELET # BLD AUTO: 254 THOUSANDS/UL (ref 149–390)
PLATELET BLD QL SMEAR: ADEQUATE
PMV BLD AUTO: 9.6 FL (ref 8.9–12.7)
POIKILOCYTOSIS BLD QL SMEAR: PRESENT
POLYCHROMASIA BLD QL SMEAR: PRESENT
POTASSIUM SERPL-SCNC: 5 MMOL/L (ref 3.5–5.3)
RBC # BLD AUTO: 3.3 MILLION/UL (ref 3.81–5.12)
RBC MORPH BLD: PRESENT
SODIUM SERPL-SCNC: 137 MMOL/L (ref 135–147)
WBC # BLD AUTO: 15.13 THOUSAND/UL (ref 4.31–10.16)

## 2025-06-02 PROCEDURE — 87081 CULTURE SCREEN ONLY: CPT | Performed by: INTERNAL MEDICINE

## 2025-06-02 PROCEDURE — 99223 1ST HOSP IP/OBS HIGH 75: CPT | Performed by: INTERNAL MEDICINE

## 2025-06-02 PROCEDURE — 99232 SBSQ HOSP IP/OBS MODERATE 35: CPT | Performed by: INTERNAL MEDICINE

## 2025-06-02 PROCEDURE — 80048 BASIC METABOLIC PNL TOTAL CA: CPT

## 2025-06-02 PROCEDURE — 85007 BL SMEAR W/DIFF WBC COUNT: CPT

## 2025-06-02 PROCEDURE — 99223 1ST HOSP IP/OBS HIGH 75: CPT

## 2025-06-02 PROCEDURE — 85027 COMPLETE CBC AUTOMATED: CPT

## 2025-06-02 PROCEDURE — 83520 IMMUNOASSAY QUANT NOS NONAB: CPT

## 2025-06-02 RX ORDER — LORAZEPAM 2 MG/ML
2 INJECTION INTRAMUSCULAR EVERY 8 HOURS PRN
Status: DISCONTINUED | OUTPATIENT
Start: 2025-06-02 | End: 2025-06-07 | Stop reason: HOSPADM

## 2025-06-02 RX ORDER — ACETAMINOPHEN 325 MG/1
975 TABLET ORAL EVERY 8 HOURS SCHEDULED
Status: DISCONTINUED | OUTPATIENT
Start: 2025-06-02 | End: 2025-06-07 | Stop reason: HOSPADM

## 2025-06-02 RX ORDER — HYDROMORPHONE HYDROCHLORIDE 4 MG/1
4 TABLET ORAL EVERY 4 HOURS PRN
Refills: 0 | Status: DISCONTINUED | OUTPATIENT
Start: 2025-06-02 | End: 2025-06-07 | Stop reason: HOSPADM

## 2025-06-02 RX ORDER — HYDROMORPHONE HYDROCHLORIDE 2 MG/1
2 TABLET ORAL EVERY 4 HOURS PRN
Refills: 0 | Status: DISCONTINUED | OUTPATIENT
Start: 2025-06-02 | End: 2025-06-07 | Stop reason: HOSPADM

## 2025-06-02 RX ORDER — BISACODYL 10 MG
10 SUPPOSITORY, RECTAL RECTAL DAILY PRN
Status: DISCONTINUED | OUTPATIENT
Start: 2025-06-02 | End: 2025-06-07 | Stop reason: HOSPADM

## 2025-06-02 RX ORDER — HYDROMORPHONE HYDROCHLORIDE 4 MG/1
4 TABLET ORAL
Refills: 0 | Status: DISCONTINUED | OUTPATIENT
Start: 2025-06-02 | End: 2025-06-07 | Stop reason: HOSPADM

## 2025-06-02 RX ADMIN — ENOXAPARIN SODIUM 40 MG: 40 INJECTION SUBCUTANEOUS at 09:14

## 2025-06-02 RX ADMIN — LEVOTHYROXINE SODIUM 50 MCG: 0.05 TABLET ORAL at 06:24

## 2025-06-02 RX ADMIN — LEVETIRACETAM 1000 MG: 500 TABLET, FILM COATED ORAL at 21:17

## 2025-06-02 RX ADMIN — MIRTAZAPINE 7.5 MG: 15 TABLET, FILM COATED ORAL at 21:25

## 2025-06-02 RX ADMIN — SENNOSIDES 17.2 MG: 8.6 TABLET, FILM COATED ORAL at 09:15

## 2025-06-02 RX ADMIN — ACETAMINOPHEN 975 MG: 325 TABLET ORAL at 01:54

## 2025-06-02 RX ADMIN — AMLODIPINE BESYLATE 10 MG: 10 TABLET ORAL at 09:16

## 2025-06-02 RX ADMIN — POLYETHYLENE GLYCOL 3350 17 G: 17 POWDER, FOR SOLUTION ORAL at 09:14

## 2025-06-02 RX ADMIN — ACETAMINOPHEN 975 MG: 325 TABLET ORAL at 09:21

## 2025-06-02 RX ADMIN — HYDROMORPHONE HYDROCHLORIDE 2 MG: 2 TABLET ORAL at 18:33

## 2025-06-02 RX ADMIN — DEXAMETHASONE 3 MG: 2 TABLET ORAL at 09:14

## 2025-06-02 RX ADMIN — GABAPENTIN 200 MG: 100 CAPSULE ORAL at 09:15

## 2025-06-02 RX ADMIN — CYANOCOBALAMIN TAB 500 MCG 1000 MCG: 500 TAB at 09:16

## 2025-06-02 RX ADMIN — SULFAMETHOXAZOLE AND TRIMETHOPRIM 1 TABLET: 800; 160 TABLET ORAL at 09:15

## 2025-06-02 RX ADMIN — GABAPENTIN 300 MG: 300 CAPSULE ORAL at 21:17

## 2025-06-02 RX ADMIN — HYDROMORPHONE HYDROCHLORIDE 4 MG: 4 TABLET ORAL at 21:18

## 2025-06-02 RX ADMIN — LEVETIRACETAM 1000 MG: 500 TABLET, FILM COATED ORAL at 09:16

## 2025-06-02 RX ADMIN — PANTOPRAZOLE SODIUM 40 MG: 40 TABLET, DELAYED RELEASE ORAL at 06:24

## 2025-06-02 RX ADMIN — ACETAMINOPHEN 975 MG: 325 TABLET ORAL at 15:50

## 2025-06-02 RX ADMIN — DEXAMETHASONE 2 MG: 2 TABLET ORAL at 15:50

## 2025-06-02 RX ADMIN — ATORVASTATIN CALCIUM 40 MG: 40 TABLET, FILM COATED ORAL at 15:50

## 2025-06-02 RX ADMIN — QUETIAPINE 25 MG: 25 TABLET ORAL at 21:17

## 2025-06-02 RX ADMIN — ACETAMINOPHEN 975 MG: 325 TABLET ORAL at 21:18

## 2025-06-02 RX ADMIN — ASPIRIN 81 MG: 81 TABLET, CHEWABLE ORAL at 09:15

## 2025-06-02 NOTE — ASSESSMENT & PLAN NOTE
Recently admitted to hospital on 5/5, found to have right MCA CVA.  Patient was on rehab and discharged home on 5/30/2025.  She reports difficulty moving around and had requested to be discharged to rehab.

## 2025-06-02 NOTE — CONSULTS
Consultation - Palliative & Supportive Care   Ayla Nye  74 y.o.  female  W /W -01   MRN: [id number]  Encounter: [id number]    ASSESSMENT & PLAN:    Palliative care by specialist  Assessment & Plan  Palliative Diagnosis: Stage IV adenocarcinoma of the lung    Goals:   Code Status: DNR- Level 3  Discussed QoL.  Oncology present during visit and review most recent imaging showing disease progression. Discussed treatments options- third line chemo. Patient asked appropriate questions.   Patient states that she wants to be comfortable.  Discussed hospice. Patient reports that being comfort is what is important to her.   She would prefer hospice at home, but would be ok with hospice in a facility.   Patient asked that I reach out to daughter Juliana to speak with her,   Spoke with daughter Juliana via telephone Reviewed with her the discussion that took place with oncology and her moms wishes to be comfortable. Juliana verbalized her understanding and wants what her mom wants. She wants to be as supportive as possible. Agreed to hospice consult and will await their call to set up a time to speak. She will also reach out to sister Sue to inform her of patients decisions.   Decisional apparatus:  Patient is  competent on my exam today.  If competence is lost, patient's substitute decision maker would default to adult daughters by [place].  Advance Directive / Living Will / POLST: On file.  Palliative will follow for ongoing goals of care discussions as situation evolves.    Social Support:  Patient's support system: family  Supportive listening provided  Normalized experience of patient    Care Coordination  Case discussed with CM    Follow-up  Hospice consulted.  Please do not hesitate to contact our on-call provider through EPIC Secure Chat or contact [phone] should there be an acute change or other symptom control concerns.    Please do not make any changes to symptom medications (opioid  analgesics, nonopioid analgesics, antiemetics, etc) without first consulting the on-call Palliative Care provider for your specific campus; including after hours and on weekends. We are available 24/7, and can be contacted on Epic secure chat or at [phone].      Cancer associated pain  Assessment & Plan  Acetaminophen 1000 mg 3 times daily scheduled  Gabapentin 200 mg every morning and 300 mg at bedtime  Dilaudid 2 mg/4 mg p.o. every 4 hours as needed for moderate/severe pain  Dilaudid 4mg scheduled at bedtime. 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 female with stage IV adenocarcinoma of the lung with mets to brain, known to palliative medicine at home program, presented to [facility] on 5/31 with worsening right sided flank pain and dark urine.  Was previously at [facility] prior to admission.  She was recently discharged from [facility] the day prior after a stay from 5/15 through 5/30 following admission for fall with head strike.  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 Palliative consulted for goals and symptoms.    Supported by daughters, Sue and Juliana    Patient resting in bed. No family present.   Patient states that pain is controlled with Tylenol. Although she feels she would benefit from taking the dilaudid at bedtime as that is what worked for her at home. BM two days ago.   Oncology present to discuss most recent imaging and options. After  discussion patient would like to be comfortable and would like to speak with hospice. Spoke with daughter Juliana at request of patient. Juliana to meet with hospice team.     Past Medical History[1]  Past Surgical History[2]  Social History     Socioeconomic History    Marital status:      Spouse name: Not on file    Number of children: Not on file    Years of education: Not on file    Highest education level: Not on file   Occupational History    Not on file   Tobacco Use    Smoking status: Former     Current packs/day: 1.00     Average packs/day: 1 pack/day for 59.4 years (59.4 ttl pk-yrs)     Types: Cigarettes     Start date: 1966     Passive exposure: Past    Smokeless tobacco: Never    Tobacco comments:     Quit 2010   Vaping Use    Vaping status: Never Used   Substance and Sexual Activity    Alcohol use: Not Currently     Alcohol/week: 5.0 standard drinks of alcohol     Types: 5 Glasses of wine per week    Drug use: Never    Sexual activity: Not Currently     Partners: Male     Birth control/protection: Post-menopausal   Other Topics Concern    Not on file   Social History Narrative    Not on file     Social Drivers of Health     Financial Resource Strain: Low Risk  (8/14/2023)    Overall Financial Resource Strain (CARDIA)     Difficulty of Paying Living Expenses: Not hard at all   Food Insecurity: No Food Insecurity (5/31/2025)    Nursing - Inadequate Food Risk Classification     Worried About Running Out of Food in the Last Year: Never true     Ran Out of Food in the Last Year: Never true     Ran Out of Food in the Last Year: Never true   Transportation Needs: No Transportation Needs (5/31/2025)    Nursing - Transportation Risk Classification     Lack of Transportation: Not on file     Lack of Transportation: No   Physical Activity: Not on file   Stress: Not on file   Social Connections: Not on file   Intimate Partner Violence: Unknown (5/31/2025)    Nursing IPS     Feels Physically and Emotionally  "Safe: Not on file     Physically Hurt by Someone: Not on file     Humiliated or Emotionally Abused by Someone: Not on file     Physically Hurt by Someone: No     Hurt or Threatened by Someone: No   Housing Stability: Unknown (2025)    Nursing: Inadequate Housing Risk Classification     Has Housing: Not on file     Worried About Losing Housing: Not on file     Unable to Get Utilities: Not on file     Unable to Pay for Housing in the Last Year: No     Has Housin     Family History[3]    MEDICATIONS / ALLERGIES:  all current active meds have been reviewed    Allergies[4]    OBJECTIVE:  /77   Pulse 88   Temp 97.6 °F (36.4 °C)   Resp 16   Ht 5' 2\" (1.575 m)   Wt 54 kg (119 lb 0.8 oz)   SpO2 (!) 89%   BMI 21.77 kg/m²   Physical Exam  Vitals and nursing note reviewed.   Constitutional:       General: She is not in acute distress.  HENT:      Head: Normocephalic and atraumatic.     Eyes:      General:         Right eye: No discharge.         Left eye: No discharge.       Cardiovascular:      Rate and Rhythm: Normal rate.   Pulmonary:      Effort: Pulmonary effort is normal. No respiratory distress.     Musculoskeletal:         General: Swelling present.      Cervical back: Normal range of motion.     Skin:     General: Skin is warm and dry.     Neurological:      Mental Status: She is alert and oriented to person, place, and time.     Psychiatric:         Mood and Affect: Mood normal.         Behavior: Behavior normal.         Thought Content: Thought content normal.         Judgment: Judgment normal.         Lab Results: I have personally reviewed pertinent labs.    HEMATOLOGY PROFILE:  Results from last 7 days   Lab Units 25  0459 25  0557 25  1305   WBC Thousand/uL 15.13* 17.43* 16.14*   HEMOGLOBIN g/dL 9.3* 10.1* 10.9*   HEMATOCRIT % 30.6* 33.1* 34.7*   PLATELETS Thousands/uL 254 269 267   MONO PCT % 4 3* 6   EOS PCT % 0 0 0       CHEMISTRY PROFILE:  Results from last 7 days   Lab " Units 06/02/25  0459 06/01/25  0557 05/31/25  1305   SODIUM mmol/L 137 135 136   POTASSIUM mmol/L 5.0 5.1 4.3   CHLORIDE mmol/L 104 102 104   CO2 mmol/L 25 25 23   BUN mg/dL 40* 28* 29*   CREATININE mg/dL 0.95 0.98 0.84   ALBUMIN g/dL  --   --  3.5   CALCIUM mg/dL 8.5 9.4 9.4   ALK PHOS U/L  --   --  52   ALT U/L  --   --  12   AST U/L  --   --  9*       Albumin:  0   Lab Value Date/Time    ALB 3.5 05/31/2025 1305     Imaging Studies: I have personally reviewed pertinent reports.  Procedure: MRI brain w wo contrast  Result Date: 5/13/2025  Narrative: MRI BRAIN WITH AND WITHOUT CONTRAST INDICATION: Progrsesive L sided weakness, known vasogenic edema. COMPARISON: MRI dated 5/6/2025. TECHNIQUE: Multiplanar, multisequence imaging of the brain was performed before and after gadolinium administration. IV Contrast:  5 mL of Gadobutrol injection (SINGLE-DOSE) IMAGE QUALITY:   Diagnostic. FINDINGS: BRAIN PARENCHYMA: Once again identified is a complex irregularly peripherally enhancing mass within the left occipital lobe, grossly unchanged from the recent examination performed 1 week ago measuring 3.1 x 2.6 cm on series 11 image 16. Above this within the left parietal lobe there is a second small enhancing lesion on image 20 measuring 1.3 x 1.0 cm, also grossly unchanged. Vasogenic edema is seen surrounding these lesions which appears grossly unchanged. No new enhancing lesion is identified since the prior examination. Diffusion imaging demonstrates small foci of restricted diffusion within the right posterior frontal lobe in a pattern similar to the prior examination. These are most suggestive of subacute infarcts. There is no progression, enhancement or associated mass effect. White matter changes elsewhere within the cerebral hemispheres consistent with chronic microangiopathic change. Stable appearance of the brainstem and cerebellum. VENTRICLES:  Normal for the patient's age. SELLA AND PITUITARY GLAND:  Normal. ORBITS:   Normal. PARANASAL SINUSES:  Normal. VASCULATURE:  Evaluation of the major intracranial vasculature demonstrates appropriate flow voids. CALVARIUM AND SKULL BASE:  Normal. EXTRACRANIAL SOFT TISSUES:  Normal.     Impression: Stable enhancing lesions within the left superior occipital lobe and left parietal lobe with surrounding vasogenic edema. No new enhancing lesions. Stable foci of restricted diffusion within the right posterior frontal lobe most likely sequela of subacute ischemia. No progression, mass effect or abnormal enhancement identified. Workstation performed: [id number]     Procedure: US bedside procedure  Result Date: 5/12/2025  Narrative: [id number]    Procedure: CT chest abdomen pelvis w contrast  Result Date: 5/12/2025  Narrative: CT CHEST, ABDOMEN AND PELVIS WITH IV CONTRAST INDICATION: TRAUMA/STROKE Alert. Fall. Additional history of metastatic right upper lobe adenocarcinoma treated with radiation therapy. 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 COMPARISON: CT chest abdomen pelvis 3/21/2025 TECHNIQUE: CT examination of the chest, abdomen and pelvis was performed. Multiplanar 2D reformatted images were created from the source data. This examination, like all CT scans performed in the [facility], was performed utilizing techniques to minimize radiation dose exposure, including the use of iterative reconstruction and automated exposure control. Radiation dose length product (DLP) for this visit: 1650 mGy-cm. IV Contrast: 85 mL of iohexol (OMNIPAQUE) Enteric Contrast: Not administered. FINDINGS: CHEST LUNGS: Interval increase in size of several pleural metastatic lesions including: - right upper lobe mass measuring 6.6 x 3.4 cm, previously 4.8 x 2.6 cm on 3/21/2025 (series 306 image 42). This attenuates right upper lobe subsegmental bronchi. Mass effect on the superior vena cava. -  lingular mass measuring 6.0 x 4.1 cm, previously 4.2 x 2.7 cm (series 301 image 79). - right lower lobe mass measuring 1.7 x 1.7 cm, previously 0.9 x 0.9 cm (series 306 image 81). - new right lower lobe nodule measuring one-point service upper 9 cm (series 306 image 69). Additional nodules that are new or increased in size are measured on series 306. Similar right upper lobe postradiation changes. PLEURA: Unremarkable. HEART/GREAT VESSELS: Heart is unremarkable for patient's age. No thoracic aortic aneurysm. Coronary artery calcium. MEDIASTINUM AND SUDEEP: Unremarkable. CHEST WALL AND LOWER NECK: Increased size of left posterolateral chest wall intramuscular mass measuring 2.3 x 1.5 cm, previously 0.9 x 0.6 cm (series 306 image 71). ABDOMEN LIVER/BILIARY TREE: Unremarkable. GALLBLADDER: No calcified gallstones. No pericholecystic inflammatory change. SPLEEN: Unremarkable. PANCREAS: Unremarkable. ADRENAL GLANDS: Unremarkable. KIDNEYS/URETERS: Increased size of right renal mass measuring 6.2 x 5.9 x 6.2 cm, previously 5.7 x 5.6 x 5.5 cm on 3/21/2025 (series 306 image 112). Unchanged right urothelial enhancement. No hydronephrosis or urinary tract calculi. Additional subcentimeter hypoattenuating renal lesion(s), too small to characterize but statistically likely benign, which do not warrant follow-up (Radiology June 2019). STOMACH AND BOWEL: Colonic diverticulosis without findings of acute diverticulitis. APPENDIX: Surgically absent. ABDOMINOPELVIC CAVITY: No ascites. No pneumoperitoneum. No lymphadenopathy. VESSELS: Atherosclerosis without abdominal aortic aneurysm. PELVIS REPRODUCTIVE ORGANS: Unremarkable for patient's age. URINARY BLADDER: Unremarkable. ABDOMINAL WALL/INGUINAL REGIONS: New enhancing right thigh intramuscular mass measuring 1.7 x 1.4 cm (series 306 image 191). Unchanged size of right ischial rectal fossa mass measuring 3.5 x 2.3 cm. BONES: No acute fracture or suspicious osseous lesion. Spinal  degenerative changes. Chronic T5 vertebral body compression deformity.     Impression: No findings of acute traumatic injury in the chest, abdomen or pelvis. History of metastatic lung cancer with evidence for disease progression with several new and enlarging lesions as described above. Redemonstrated heterogeneous right renal mass associated with urothelial enhancement is concerning for metastatic lesion with probable involvement of the collecting system. The study was marked in EPIC for immediate notification. Resident: Damaso Briscoe I, the attending radiologist, have reviewed the images and agree with the final report above. Workstation performed: [id number]     Procedure: CTA head and neck w wo contrast  Result Date: 5/12/2025  Narrative: CTA NECK AND BRAIN WITH AND WITHOUT CONTRAST INDICATION: TRAUMA/STROKE Alert COMPARISON:   None. TECHNIQUE:  Routine CT imaging of the Brain without contrast.Post contrast imaging was performed after administration of iodinated contrast through the neck and brain. Post contrast axial 0.625 mm images timed to opacify the arterial system.  3D rendering was performed on an independent workstation.   MIP reconstructions performed. Coronal and sagittal reconstructions were performed of the non contrast portion of the brain. Radiation dose length product (DLP) for this visit:  1059 mGy-cm. .  This examination, like all CT scans performed in the [facility], was performed utilizing techniques to minimize radiation dose exposure, including the use of iterative reconstruction and automated exposure control. IV Contrast:  85 mL of iohexol (OMNIPAQUE) IMAGE QUALITY:   Diagnostic FINDINGS: NONCONTRAST BRAIN PARENCHYMA: Vasogenic edema identified in the left parietal vertex similar to prior brain MRI and known to be the sequela of underlying brain metastatic disease. No hemorrhage identified. No midline shift. Low-density in periventricular white matter compatible with  mild chronic microangiopathic change. VENTRICLES AND EXTRA-AXIAL SPACES:Normal for the patient's age. VISUALIZED ORBITS: Normal. PARANASAL SINUSES: Normal. CTA NECK ARCH AND GREAT VESSELS: Visualized arch and great vessels are normal. VERTEBRAL ARTERIES: Patent extracranial segments. RIGHT CAROTID: No stenosis.    No dissection. LEFT CAROTID: No stenosis.    No dissection. NASCET criteria was used to determine the degree of internal carotid artery diameter stenosis. CTA BRAIN: INTERNAL CAROTID ARTERIES: Atherosclerotic calcification cavernous and supraclinoid ICA no stenosis or occlusion. ANTERIOR CEREBRAL ARTERY CIRCULATION:  No stenosis or occlusion. MIDDLE CEREBRAL ARTERY CIRCULATION:  No stenosis or occlusion. DISTAL VERTEBRAL ARTERIES:  No stenosis or occlusion. BASILAR ARTERY:  No stenosis or occlusion. POSTERIOR CEREBRAL ARTERIES: No stenosis or occlusion. VENOUS STRUCTURES:  Normal. NON VASCULAR ANATOMY BONY STRUCTURES:  No acute osseous abnormality. SOFT TISSUES OF THE NECK:  Normal. THORACIC INLET: Status post treatment of right upper lobe lung cancer with pulmonary fibrosis/scarring unchanged in the paramedian right upper lobe.     Impression: CT Brain: Vasogenic edema left parietal vertex compatible with known brain metastasis. No hemorrhage identified. CT Angiography: No significant carotid or vertebral artery stenosis. No focal intracranial stenosis or aneurysm. Workstation performed: [id number]     Procedure: MRI brain w wo contrast  Result Date: 5/6/2025  Narrative: MRI BRAIN WITH AND WITHOUT CONTRAST INDICATION: stroke.   Metastatic lung cancer. COMPARISON: Head CT from 5/5/2025, prior brain MR from 4/21/2025 TECHNIQUE: Multiplanar, multisequence imaging of the brain was performed before and after gadolinium administration. IV Contrast:  5 mL of Gadobutrol injection IMAGE QUALITY:   Diagnostic. FINDINGS: BRAIN PARENCHYMA: Slight increase in size of the dominant, heterogeneous, peripherally enhancing  lesion involving the posterior left parietal occipital junction, when compared to the prior study, now measuring 3.0 cm x 2.2 cm in maximal orthogonal dimensions on series 11 image 75, was previously this measured approximately 2.9 cm x 1.9 cm in maximal dimensions. Slight worsening rim enhancement particularly anteriorly and medially also noted. Surrounding vasogenic edema appears relatively similar. Areas susceptibility indicative of hemorrhage again noted. Additional smaller, adjacent peripherally enhancing lesion involving the parafalcine left parietal lobe, seen on series 11 image 98 measuring 1.2 cm x 0.9 cm in maximal dimensions, also slightly increased in size when compared to the prior study. Send vasogenic edema again noted. No new intracranial enhancing lesions noted. Stable foci of diffusion signal hyperintensity without definite restriction involving the corona radiata and centrum semiovale white matter extending to the cortex of the right frontoparietal junction. Mild associated FLAIR hyperintensity but no associated enhancement noted. Asymmetric susceptibility noted involving the cortical pre and post central gyri, seen on series 7 image 96, without associated enhancement, encephalomalacia or additional signal abnormalities in these regions. This is new when compared to prior studies from 2024. No midline shift noted. Moderate additional T2/FLAIR hyperintensities involving the white matter both redemonstrated, that represent superimposed chronic white matter microangiopathic changes. Additional chronic punctate microhemorrhage involving the left middle frontal gyrus. VENTRICLES: No hydrocephalus. SELLA AND PITUITARY GLAND:  Normal. ORBITS:  Normal. PARANASAL SINUSES:  Normal. VASCULATURE:  Evaluation of the major intracranial vasculature demonstrates appropriate flow voids. CALVARIUM AND SKULL BASE: Mildly heterogeneous bone marrow signal intensity of the calvarium, but no discrete marrow-replacing  lesion is identified. EXTRACRANIAL SOFT TISSUES:  Normal.     Impression: 1.  Slight increase in size of the peripherally enhancing lesions involving the left parietal occipital junction, and the parafalcine left parietal lobe, with stable surrounding vasogenic edema. These may represent evolving posttreatment changes, but continued attention on subsequent studies is recommended. 2.  No new intracranial enhancing lesions noted. 3.  Stable foci of diffusion signal hyperintensity involving the right corona radiata and centrum semiovale white matter extending to the cortex of the right frontoparietal junction, without associated enhancement. While this may represent the sequela of  subacute infarcts, asymmetric toxic leukoencephalopathy from chemotherapy agent is also a consideration. 4.  Asymmetric susceptibility involving the cortex of the right pre and postcentral gyri, without associated enhancement, encephalomalacia or additional signal abnormalities. This is new when compared to prior studies from 2024, and may represent a sequela of prior subarachnoid hemorrhage in this region, or sequela of prior insult. 5.  Moderate chronic white matter microangiopathic changes. Workstation performed: [id number]     Procedure: CTA head and neck with and without contrast  Result Date: 5/5/2025  Narrative: CTA NECK AND BRAIN WITH AND WITHOUT CONTRAST INDICATION: new L sided weakness, arm and leg, x 2 days COMPARISON:   MRI brain with and without contrast 4/21/2025. CTA head and neck with and without contrast 4/21/2025. TECHNIQUE:  Routine CT imaging of the Brain without contrast.Post contrast imaging was performed after administration of iodinated contrast through the neck and brain. Post contrast axial 0.625 mm images timed to opacify the arterial system.  3D rendering was performed on an independent workstation.   MIP reconstructions performed. Coronal and sagittal reconstructions were performed of the non contrast portion of  the brain. Dual energy technique was utilized during this examination. Radiation dose length product (DLP) for this visit:  2085 mGy-cm. .  This examination, like all CT scans performed in the [facility], was performed utilizing techniques to minimize radiation dose exposure, including the use of iterative reconstruction and automated exposure control. IV Contrast:  60 mL of iohexol IMAGE QUALITY:   Diagnostic FINDINGS: NONCONTRAST BRAIN PARENCHYMA: Known left occipital lobe metastatic mass with small focus of subacute hemorrhage, and diffuse perilesional vasogenic edema in left cerebral hemisphere (worse posteriorly) was better evaluated on MRI brain with and without contrast dated 4/21/2025. No new enlarging sites of acute intracranial hemorrhage. Decreased attenuation is noted in periventricular and subcortical white matter demonstrating an appearance that is statistically most likely to represent moderate microangiopathic change. No CT signs of acute infarction. No midline shift. VENTRICLES AND EXTRA-AXIAL SPACES: Normal for the patient's age. VISUALIZED ORBITS: Normal. PARANASAL SINUSES: Normal. CTA NECK ARCH AND GREAT VESSELS: Mild atherosclerotic changes with no severe stenosis. VERTEBRAL ARTERIES: Patent extracranial segments. RIGHT CAROTID: Patent. Mild calcified atherosclerotic disease in carotid bifurcation. No stenosis.    No dissection. LEFT CAROTID: Patent. Minimal calcified atherosclerotic disease in carotid bifurcation. No stenosis.    No dissection. NASCET criteria was used to determine the degree of internal carotid artery diameter stenosis. CTA BRAIN: INTERNAL CAROTID ARTERIES: Mild calcified atherosclerotic disease in bilateral ICA cavernous to supraclinoid segments. No stenosis or occlusion. ANTERIOR CEREBRAL ARTERY CIRCULATION:  No stenosis or occlusion. Hypoplastic right A1 segment, normal variant. MIDDLE CEREBRAL ARTERY CIRCULATION:  No stenosis or occlusion. DISTAL VERTEBRAL  ARTERIES:  No stenosis or occlusion. BASILAR ARTERY:  No stenosis or occlusion. POSTERIOR CEREBRAL ARTERIES: No stenosis or occlusion. VENOUS STRUCTURES:  Normal. NON VASCULAR ANATOMY BONY STRUCTURES:  No acute osseous abnormality. Diffuse osteopenia. Chronic T4 inferior endplate compression fracture with minor height loss and subchondral sclerosis. Chronic T5 superior endplate compression fracture with mild height loss and subchondral sclerosis. SOFT TISSUES OF THE NECK: Dental treatment-related changes with extensive beam hardening artifact limits evaluation of anterior oral cavity for which direct visualization is recommended. No acute neck abnormality. Subcentimeter hypodense nodule right thyroid lobe. Incidental discovery of one or more thyroid nodule(s) measuring less than 1.5 cm and without suspicious features is noted in this patient who is above 35 years old; according to guidelines published in the February 2015 white paper on incidental thyroid nodules in the Journal of the American College of Radiology (JACR), no further evaluation is recommended. THORACIC INLET: Partially imaged emphysema, known treated right upper lobe lung mass with paramediastinal fibrosis, small left upper lobe metastatic pulmonary nodule     Impression: CT Brain: - Known left occipital lobe metastatic mass with small focus of subacute hemorrhage, and diffuse perilesional vasogenic edema in left cerebral hemisphere (worse posteriorly) was better evaluated on MRI brain with and without contrast dated 4/21/2025. No new acute intracranial abnormality. Consider follow-up MRI brain with and without contrast for further evaluation. CT Angiography: - Negative CTA head and neck for large vessel occlusion, dissection, aneurysm, or high-grade stenosis. - Partially imaged known right upper lobe lung mass with adjacent paramediastinal fibrosis, small left upper lobe metastatic pulmonary nodule. Recommend follow-up dedicated CT chest with  "contrast per oncologic recommendations. Additional chronic/incidental findings as detailed above. The study was marked in EPIC for immediate notification. Workstation performed: [id number]     EKG, Pathology, and Other Studies: I have personally reviewed pertinent reports.    Counseling / Coordination of Care:  60 minutes total time spent on 06/02/25 in caring for this patient including obtaining/reviewing history, symptom assessment and management, medication review / adjustment, psychosocial support, reviewing / ordering tests, medicines, imaging, procedures, prognosis, goals of care, hospice services, supportive listening, anticipatory guidance, patient/family education, instructions for management, importance of adhering to treatment plan, risks/benefits of treatment(s), risk factor reduction, and documenting in the medical record. All patient/family questions were answered during this discussion.    Lisy JAIN, TWILANP  [facility]  [phone]    Portions of this document may have been created using dictation software and as such some \"sound alike\" terms may have been generated by the system. Do not hesitate to contact me with any questions or clarifications.         [1]   Past Medical History:  Diagnosis Date    DILLON (acute kidney injury) (HCC) 02/14/2025    Allergic 2022    Allergic to neomiacin and cephalexin    Arthritis     Cancer (HCC)     lung cancer    Cancer (HCC)     mets to brain    Cancer (HCC)     perianal mass    Cataract Nov. 2023    Due to have durgery June 2024    Disease of thyroid gland     Essential thrombocytosis (HCC)     controlled with medication    History of transfusion     Hyperlipidemia 2015    Hypertension 2011    Mass in chest     Nodular goiter     Osteoporosis     Peripheral neuropathy     Pneumonia Oct 16, 2023    Also  Feb 2024    Psoriasis     SIRS (systemic inflammatory response syndrome) (HCC) 06/22/2024    Stroke (HCC)    [2]   Past " Surgical History:  Procedure Laterality Date    APPENDECTOMY      BREAST CYST EXCISION Right     COLONOSCOPY  10/31/2018    DXA PROCEDURE (HISTORICAL)  2017    IR BIOPSY OTHER  2024    IR LUMBAR PUNCTURE  2024    MAMMO (HISTORICAL)      18    MAMMO NEEDLE LOCALIZATION RIGHT (ALL INC) Right 2009    SKIN LESION EXCISION      bridge of nose   [3]   Family History  Problem Relation Name Age of Onset    Stroke Mother Teresa     Depression Mother Teresa             Hypertension Mother Teresa     Hearing loss Mother Teresa     Tuberculosis Father      Cancer Brother Mauro         lung    Tuberculosis Brother Mauro     Coronary artery disease Brother Mauro     Hypertension Brother Mauro     Heart disease Brother Mauro     No Known Problems Daughter      No Known Problems Daughter      No Known Problems Maternal Grandmother      No Known Problems Maternal Grandfather      No Known Problems Paternal Grandmother      No Known Problems Paternal Grandfather      No Known Problems Maternal Aunt      No Known Problems Maternal Aunt      No Known Problems Maternal Aunt      No Known Problems Maternal Aunt      No Known Problems Paternal Aunt      Cancer Brother Tin         Throat cancer   [4]   Allergies  Allergen Reactions    Doxycycline Headache    Keflex [Cephalexin] Rash    Neomycin-Polymyxin-Dexameth Eye Swelling

## 2025-06-02 NOTE — ASSESSMENT & PLAN NOTE
Acetaminophen 1000 mg 3 times daily scheduled  Gabapentin 200 mg every morning and 300 mg at bedtime  Dilaudid 2 mg/4 mg p.o. every 4 hours as needed for moderate/severe pain  Dilaudid 4mg scheduled at bedtime. With hold parameters  Dilaudid 0.2 mg IV every 3 hours as needed breakthrough pain  Lidocaine patch  decadron 5 mg daily      ---->Dilaudid scripts printed and placed in binder on unit

## 2025-06-02 NOTE — CONSULTS
Consultation - Oncology-Medical   Name: Ayla Nye 74 y.o. female I MRN: [id number]  Unit/Bed#: W -01 I Date of Admission: 5/31/2025   Date of Service: 6/2/2025 I Hospital Day: 1   Inpatient consult to Hematology  Consult performed by: Lashanda Mckeon MD  Consult ordered by: Bro Howard MD        Physician Requesting Evaluation: Sam Silverman MD   Reason for Evaluation / Principal Problem: Patient for adenocarcinoma of lung.    Assessment & Plan  Flank pain  Patient presented with flank pain, CT abdomen pelvis remarkable for rapid interval progression of right renal mass with possible invasion of right renal vein.  Palliative consulted for pain management.  Patient opted for hospice.  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  First-line therapy with carboplatin, pemetrexed and pembrolizumab was planned for 4 cycles unfortunately she was only able to complete 2 cycles and had required multiple hospitalization for side effects/toxicities, PCP pneumonia and PE.  Started on second line therapy with docetaxel that she was able to receive only 1 cycle on 4/3/2025, cycle 2 was delayed in setting of acute stroke and recent hospitalization.  Discussed imaging results from yesterday that is concerning for disease progression.  Had extensive discussion for third line of therapy if patient remains treatment oriented, has very low efficacy for disease and intent will be palliative not curative.  Patient showed understanding and had expressed her wishes to be comfortable and focus on quality of life rather than being in and out of hospital.  Palliative care team was at bedside during this discussion, who had discussed with patient's daughter Juliana over phone.   I was able to discuss with Juliana over phone as well, she is aware of Ms Nye's decision and in agreement with  same.  Patient elected for hospice, which is very much appropriate.    Anemia  In setting of chemotherapy versus disease progression.  History of Pneumocystis jirovecii pneumonia  Patient continues on Bactrim DS 3 times a week.  History of stroke  Recently admitted to hospital on 5/5, found to have right MCA CVA.  Patient was on rehab and discharged home on 5/30/2025.  She reports difficulty moving around and had requested to be discharged to rehab.      History of Present Illness   Ayla Nye is a 74 y.o. female with stage IV adenocarcinoma of lung, metastasis to brain with symptomatic vasogenic edema, being on chronic steroid.  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  Of note patient had recurrent hospitalization, with most recent 1 in May when she had presented with left-sided hemiparesis secondary to right MCA CVA and was discharged to rehab.  Patient completed her acute rehab on 5/30 and presented to hospital 1 day later with abdominal pain.    CT abdomen pelvis from 5/21 remarkable for rapid interval progression of diffuse metastatic disease enlargement of right renal mass, possible invasion of right renal vein, new mild right hydronephrosis.  CT head with no acute hemorrhage or mass effect, stable vasogenic edema, left parietal and occipital lobe consistent with underlying metastasis.    At time of my evaluation patient resting comfortably on bed but continues with abdominal pain.  She had questions if immunotherapy will be considered, unfortunately given she was not able to tolerate immunotherapy in the past that will not be option.  We further discussed if patient remains treatment oriented, can think about third line of therapy keeping in mind efficacy of medications decrease as we progress for line  of treatments.    After listening to all options, patient had expressed her wishes to be comfortable and focus on quality of life, which is appropriate.  This discussion made in presence of palliative care team, who had reached out to patient's daughter Juliana.          Review of Systems   Constitutional:  Positive for activity change, fatigue and unexpected weight change.   Cardiovascular:  Negative for chest pain and palpitations.   Gastrointestinal:  Positive for abdominal pain. Negative for diarrhea and vomiting.   Musculoskeletal:  Negative for back pain and myalgias.   Neurological:  Negative for tremors and headaches.   Psychiatric/Behavioral:  Negative for confusion.    All other systems reviewed and are negative.    Historical Information   Social History[1]  Doxycycline, Keflex [cephalexin], and Neomycin-polymyxin-dexameth    Oncology History:   Cancer Staging   Metastatic adenocarcinoma (HCC)  Staging form: Lung, AJCC 8th Edition  - Clinical stage from 4/15/2024: Stage IIIB (cT2b, cN3, cM0) - Signed by Rogelio Beebe DO on 4/15/2024  - Clinical: Stage IV (cM1) - Signed by Honey Gamboa MD on 9/23/2024    Oncology History   Metastatic adenocarcinoma (HCC)   2024 Initial Diagnosis    Primary lung adenocarcinoma, right (HCC)     3/26/2024 Biopsy    A-C. Lymph Node, Level 4R :    - Metastatic non-small cell carcinoma, most compatible with a primary lung adenocarcinoma; see note.       D-F. Lymph Node, Level 10R:    - Metastatic non-small cell carcinoma; see note.       G-I. Lymph Node, Level 4L:    - Metastatic non-small cell carcinoma; see note.       4/15/2024 -  Cancer Staged    Staging form: Lung, AJCC 8th Edition  - Clinical stage from 4/15/2024: Stage IIIB (cT2b, cN3, cM0) - Signed by Rogelio Beebe DO on 4/15/2024       5/23/2024 - 7/2/2024 Chemotherapy    alteplase (CATHFLO), 2 mg, Intracatheter, Every 1 Minute as needed, 6 of 6 cycles  CARBOplatin (PARAPLATIN) IVPB (GOG AUC  DOSING), 130.4 mg, Intravenous, Once, 6 of 6 cycles  Administration: 130.4 mg (5/23/2024), 144.8 mg (5/30/2024), 138.4 mg (6/6/2024), 144.8 mg (6/13/2024), 132 mg (6/21/2024), 143.6 mg (7/2/2024)  PACLItaxel (TAXOL) chemo IVPB, 45 mg/m2 = 67.2 mg (90 % of original dose 50 mg/m2), Intravenous, Once, 6 of 6 cycles  Dose modification: 45 mg/m2 (original dose 50 mg/m2, Cycle 1, Reason: Anticipated Tolerance)  Administration: 67.2 mg (5/23/2024), 67.2 mg (5/30/2024), 67.2 mg (6/6/2024), 67.2 mg (6/13/2024), 67.2 mg (6/21/2024), 67.2 mg (7/2/2024)     5/23/2024 - 7/5/2024 Radiation    Treatment:  Course: C1    Plan ID Energy Fractions Dose per Fraction (cGy) Dose Correction (cGy) Total Dose Delivered (cGy) Elapsed Days   R Lung_Hilum 6X 30 / 30 200 0 6,000 43      Treatment dates:  C1: 5/23/2024 - 7/5/2024 8/8/2024 - 8/8/2024 Radiation    SRS 5 PTVs   2000 cGy     9/12/2024 Biopsy    Mass, Superficial perianal mass:  - Squamous cell carcinoma.     Note: The patient's prior lung EBUS sampling shows the tumor to stain for TTF1 and Napsin with absent p40 expression.  The current perianal mass sampling shows diffuse p40 expression with absent TTF1 expression.  This may represent a primary anal/perianal squamous cell carcinoma versus a possible metastatic combined lung tumor (adenocarcinoma and previously unsampled squamous component).  Suggest clinical correlation and appropriate follow-up.         9/23/2024 -  Cancer Staged    Staging form: Lung, AJCC 8th Edition  - Clinical: Stage IV (cM1) - Signed by Honey Gamboa MD on 9/23/2024       10/1/2024 - 10/18/2024 Radiation    Course: C3    Plan ID Energy Fractions Dose per Fraction (cGy) Dose Correction (cGy) Total Dose Delivered (cGy) Elapsed Days   R Gluteus:1 10X/6X 14 / 15 300 0 4,200 17      Treatment Dates:  10/1/2024 - 10/18/2024.       10/7/2024 - 2/13/2025 Chemotherapy    cyanocobalamin, 1,000 mcg, Intramuscular, Once, 2 of 3 cycles  Administration: 1,000 mcg  (8/19/2024), 1,000 mcg (1/23/2025)  alteplase (CATHFLO), 2 mg, Intracatheter, Every 1 Minute as needed, 4 of 6 cycles  palonosetron (ALOXI), 0.25 mg, Intravenous, Once, 2 of 4 cycles  Administration: 0.25 mg (1/23/2025), 0.25 mg (2/13/2025)  fosaprepitant (EMEND) IVPB, 150 mg, Intravenous, Once, 4 of 6 cycles  Administration: 150 mg (10/7/2024), 150 mg (1/23/2025), 150 mg (2/13/2025), 150 mg (11/14/2024)  CARBOplatin (PARAPLATIN) IVPB ([facility] AUC DOSING), 347 mg, Intravenous, Once, 4 of 6 cycles  Administration: 347 mg (10/7/2024), 256.8 mg (1/23/2025), 278.4 mg (2/13/2025), 346 mg (11/14/2024)  bevacizumab (AVASTIN) IVPB, 5 mg/kg = 280 mg (100 % of original dose 5 mg/kg), Intravenous, Once, 1 of 3 cycles  Dose modification: 5 mg/kg (original dose 5 mg/kg, Cycle 4, Reason: Anticipated Tolerance)  Administration: 280 mg (2/13/2025)  pemetrexed (ALIMTA) chemo infusion, 735 mg, Intravenous, Once, 4 of 6 cycles  Dose modification: 300 mg/m2 (original dose 500 mg/m2, Cycle 4, Reason: Anticipated Tolerance)  Administration: 700 mg (10/7/2024), 700 mg (1/23/2025), 468 mg (2/13/2025), 700 mg (11/14/2024)  pembrolizumab (KEYTRUDA) IVPB, 200 mg, Intravenous, Once, 2 of 2 cycles  Administration: 200 mg (11/14/2024), 200 mg (10/7/2024)     1/16/2025 - 1/16/2025 Radiation    Course: C4  Plan ID Energy Fractions Dose per Fraction (cGy) Dose Correction (cGy) Total Dose Delivered (cGy) Elapsed Days   SRS L_Front 6X-FFF 1 / 1 2,000 0 2,000 0      Treatment dates:  C4 SRS: 1/16/2025 - 1/16/2025         4/3/2025 -  Chemotherapy    DOCEtaxel (TAXOTERE) chemo infusion, 75 mg/m2 = 114 mg, 1 of 6 cycles  Dose modification: 60 mg/m2 (original dose 75 mg/m2, Cycle 2, Reason: Anticipated Tolerance)  Administration: 114 mg (4/3/2025)     Metastatic cancer to brain (HCC)   7/29/2024 Initial Diagnosis    Metastatic cancer to brain (HCC)     8/8/2024 - 8/8/2024 Radiation    SRS 5 PTVs   2000 cGy     9/12/2024 Biopsy    Mass, Superficial perianal  mass:  - Squamous cell carcinoma.     Note: The patient's prior lung EBUS sampling shows the tumor to stain for TTF1 and Napsin with absent p40 expression.  The current perianal mass sampling shows diffuse p40 expression with absent TTF1 expression.  This may represent a primary anal/perianal squamous cell carcinoma versus a possible metastatic combined lung tumor (adenocarcinoma and previously unsampled squamous component).  Suggest clinical correlation and appropriate follow-up.         10/1/2024 - 10/18/2024 Radiation    Course: C3    Plan ID Energy Fractions Dose per Fraction (cGy) Dose Correction (cGy) Total Dose Delivered (cGy) Elapsed Days   R Gluteus:1 10X/6X 14 / 15 300 0 4,200 17      Treatment Dates:  10/1/2024 - 10/18/2024.       1/16/2025 - 1/16/2025 Radiation    Course: C4  Plan ID Energy Fractions Dose per Fraction (cGy) Dose Correction (cGy) Total Dose Delivered (cGy) Elapsed Days   SRS L_Front 6X-FFF 1 / 1 2,000 0 2,000 0      Treatment dates:  C4 SRS: 1/16/2025 - 1/16/2025           Current Medications[2]    Objective :  Temp:  [98.1 °F (36.7 °C)-98.4 °F (36.9 °C)] 98.1 °F (36.7 °C)  HR:  [80-93] 88  BP: (109-125)/(68-76) 123/73  Resp:  [16-18] 16  SpO2:  [90 %-93 %] 90 %  O2 Device: None (Room air)    Physical Exam  Constitutional:       Appearance: Normal appearance.     Eyes:      Conjunctiva/sclera: Conjunctivae normal.      Pupils: Pupils are equal, round, and reactive to light.       Cardiovascular:      Rate and Rhythm: Normal rate and regular rhythm.   Pulmonary:      Effort: Pulmonary effort is normal.      Comments: Dec breath sounds  Abdominal:      Tenderness: There is abdominal tenderness.     Skin:     General: Skin is warm and dry.     Neurological:      Mental Status: She is alert and oriented to person, place, and time.           Lab Results: I have reviewed the following results:  Lab Results   Component Value Date    K 5.0 06/02/2025     06/02/2025    CO2 25 06/02/2025     BUN 40 (H) 06/02/2025    CREATININE 0.95 06/02/2025    GLUF 79 05/29/2025    CALCIUM 8.5 06/02/2025    CORRECTEDCA 9.3 04/07/2025    AST 9 (L) 05/31/2025    ALT 12 05/31/2025    ALKPHOS 52 05/31/2025    EGFR 59 06/02/2025     Lab Results   Component Value Date    WBC 15.13 (H) 06/02/2025    HGB 9.3 (L) 06/02/2025    HCT 30.6 (L) 06/02/2025    MCV 93 06/02/2025     06/02/2025     Lab Results   Component Value Date    NEUTROABS 1.50 (L) 02/19/2025     CT Abdomen Pelvis 6/1/25:  IMPRESSION:     1.  Rapid interval progression of the diffuse metastatic disease, including enlargement of the right renal mass. There is possible invasion of the right renal vein. Please see the body of the report for further description of the pertinent findings.  2.  There is new mild right hydroureteronephrosis, with slightly increased density within the right ureterovesicular junction. This may represent a small amount of blood products or a focal urothelial lesion.     3.  Please refer to the report body for description of other incidental, chronic and/or benign findings.    CT Head 6/1/25:  IMPRESSION:     1.  No acute hemorrhage or mass effect.  2.  Stable vasogenic edema left parietal and occipital lobes consistent with underlying metastases.  3.  Expected evolution of previously seen posterior right frontal lobe infarctions. No new transcortical infarction. Consider follow-up MRI if there is concern for acute ischemia.            [1]   Social History  Tobacco Use    Smoking status: Former     Current packs/day: 1.00     Average packs/day: 1 pack/day for 59.4 years (59.4 ttl pk-yrs)     Types: Cigarettes     Start date: 1966     Passive exposure: Past    Smokeless tobacco: Never    Tobacco comments:     Quit 2010   Vaping Use    Vaping status: Never Used   Substance and Sexual Activity    Alcohol use: Not Currently     Alcohol/week: 5.0 standard drinks of alcohol     Types: 5 Glasses of wine per week    Drug use: Never    Sexual  activity: Not Currently     Partners: Male     Birth control/protection: Post-menopausal   [2]   Current Facility-Administered Medications   Medication Dose Route Frequency Provider Last Rate Last Admin    acetaminophen (TYLENOL) tablet 975 mg  975 mg Oral Q6H PRN Bro Howard MD   975 mg at 06/02/25 0921    aluminum-magnesium hydroxide-simethicone (MAALOX) oral suspension 30 mL  30 mL Oral Q4H PRN Enriqueta Onofre, DO   30 mL at 06/01/25 0931    amLODIPine (NORVASC) tablet 10 mg  10 mg Oral Daily Enriqueta Onofre, DO   10 mg at 06/02/25 0916    aspirin chewable tablet 81 mg  81 mg Oral Daily Enriqueta Onofre, DO   81 mg at 06/02/25 0915    atorvastatin (LIPITOR) tablet 40 mg  40 mg Oral Daily With Dinner Enriqueta Onofre, DO   40 mg at 06/01/25 1732    cyanocobalamin (VITAMIN B-12) tablet 1,000 mcg  1,000 mcg Oral Daily Enriqueta Onofre, DO   1,000 mcg at 06/02/25 0916    dexamethasone (DECADRON) tablet 2 mg  2 mg Oral Daily Enriqueta Onofre, DO   2 mg at 06/01/25 1733    And    dexamethasone (DECADRON) tablet 3 mg  3 mg Oral Daily Enriqueta Onofre, DO   3 mg at 06/02/25 0914    enoxaparin (LOVENOX) subcutaneous injection 40 mg  40 mg Subcutaneous Daily Enriqueta Onofre, DO   40 mg at 06/02/25 0914    gabapentin (NEURONTIN) capsule 200 mg  200 mg Oral Daily Enriqueta Onofre, DO   200 mg at 06/02/25 0915    And    gabapentin (NEURONTIN) capsule 300 mg  300 mg Oral HS Enriqueta Onofre, DO   300 mg at 06/01/25 2149    HYDROmorphone HCl (DILAUDID) injection 0.2 mg  0.2 mg Intravenous Q3H PRN Bro Howard MD        labetalol (NORMODYNE) injection 10 mg  10 mg Intravenous Q6H PRN Enriqueta Onofre, DO        levETIRAcetam (KEPPRA) tablet 1,000 mg  1,000 mg Oral BID Enriqueta Onofre, DO   1,000 mg at 06/02/25 0916    levothyroxine tablet 50 mcg  50 mcg Oral Early Morning Enriqueta Onofer, DO   50 mcg at 06/02/25 0624    lidocaine (LIDODERM) 5 % patch 1 patch  1 patch Topical Daily Enriqueta Onofre DO        methocarbamol (ROBAXIN) tablet 250 mg  250 mg Oral Q6H  PRN Enriqueta Onofre, DO        mirtazapine (REMERON) tablet 7.5 mg  7.5 mg Oral HS Enriqueta Onofre, DO   7.5 mg at 06/01/25 2044    naloxone (NARCAN) intranasal 2 mg  2 mg Nasal Daily PRN Enriqueta Onofre, DO        oxyCODONE (ROXICODONE) IR tablet 5 mg  5 mg Oral Q4H PRN Bro Howard MD        oxyCODONE (ROXICODONE) split tablet 2.5 mg  2.5 mg Oral Q4H PRN Bro Howard MD   2.5 mg at 06/01/25 2044    pantoprazole (PROTONIX) EC tablet 40 mg  40 mg Oral Daily Before Breakfast Enriqueta Onofre DO   40 mg at 06/02/25 0624    polyethylene glycol (MIRALAX) packet 17 g  17 g Oral Daily Bro Howard MD   17 g at 06/02/25 0914    QUEtiapine (SEROquel) tablet 12.5 mg  12.5 mg Oral Daily PRN Enriqueta Onofre, DO   12.5 mg at 05/31/25 2127    QUEtiapine (SEROquel) tablet 25 mg  25 mg Oral HS Enriqueta Onofre, DO   25 mg at 06/01/25 2149    senna (SENOKOT) tablet 17.2 mg  2 tablet Oral Daily Enriqueta Onofre, DO   17.2 mg at 06/02/25 0915    sulfamethoxazole-trimethoprim (BACTRIM DS) 800-160 mg per tablet 1 tablet  1 tablet Oral Once per day on Monday Wednesday Friday Enriqueta Onofre DO   1 tablet at 06/02/25 0915

## 2025-06-02 NOTE — ASSESSMENT & PLAN NOTE
Diagnosed in 07/2024, s/p radiation in 08/2024 and 01/2025. Treatments complicated by toxicities and infection.   Last treatment few weeks ago in April   MRI brain 5/13 showed stable enhancing lesions in the left occipital and parietal lobe with noted vasogenic edema.  Follows Dr. Castro for oncology, Dr Gamboa for Rad/onc, and follows palliative outpt   C/w decadron 3mg in the morning and 2mg in the evening   C/w Keppra 1000mg BID   C/w Bactrim -160mg MWF  Monitor mental status   Check keppra levels

## 2025-06-02 NOTE — PLAN OF CARE
Problem: PAIN - ADULT  Goal: Verbalizes/displays adequate comfort level or baseline comfort level  Description: Interventions:  - Encourage patient to monitor pain and request assistance  - Assess pain using appropriate pain scale  - Administer analgesics as ordered based on type and severity of pain and evaluate response  - Implement non-pharmacological measures as appropriate and evaluate response  - Consider cultural and social influences on pain and pain management  - Notify physician/advanced practitioner if interventions unsuccessful or patient reports new pain  - Educate patient/family on pain management process including their role and importance of  reporting pain   - Provide non-pharmacologic/complimentary pain relief interventions  Outcome: Progressing     Problem: INFECTION - ADULT  Goal: Absence or prevention of progression during hospitalization  Description: INTERVENTIONS:  - Assess and monitor for signs and symptoms of infection  - Monitor lab/diagnostic results  - Monitor all insertion sites, i.e. indwelling lines, tubes, and drains  - Monitor endotracheal if appropriate and nasal secretions for changes in amount and color  - [place] appropriate cooling/warming therapies per order  - Administer medications as ordered  - Instruct and encourage patient and family to use good hand hygiene technique  - Identify and instruct in appropriate isolation precautions for identified infection/condition  Outcome: Progressing  Goal: Absence of fever/infection during neutropenic period  Description: INTERVENTIONS:  - Monitor WBC  - Perform strict hand hygiene  - Limit to healthy visitors only  - No plants, dried, fresh or silk flowers with patterson in patient room  Outcome: Progressing     Problem: SAFETY ADULT  Goal: Patient will remain free of falls  Description: INTERVENTIONS:  - Educate patient/family on patient safety including physical limitations  - Instruct patient to call for assistance with activity   -  Consider consulting OT/PT to assist with strengthening/mobility based on AM PAC & JH-HLM score  - Consult OT/PT to assist with strengthening/mobility   - Keep Call bell within reach  - Keep bed low and locked with side rails adjusted as appropriate  - Keep care items and personal belongings within reach  - Initiate and maintain comfort rounds  - Make Fall Risk Sign visible to staff  - Offer Toileting every  Hours, in advance of need  - Initiate/Maintain alarm  - Obtain necessary fall risk management equipment:   - Apply yellow socks and bracelet for high fall risk patients  - Consider moving patient to room near nurses station  Outcome: Progressing  Goal: Maintain or return to baseline ADL function  Description: INTERVENTIONS:  -  Assess patient's ability to carry out ADLs; assess patient's baseline for ADL function and identify physical deficits which impact ability to perform ADLs (bathing, care of mouth/teeth, toileting, grooming, dressing, etc.)  - Assess/evaluate cause of self-care deficits   - Assess range of motion  - Assess patient's mobility; develop plan if impaired  - Assess patient's need for assistive devices and provide as appropriate  - Encourage maximum independence but intervene and supervise when necessary  - Involve family in performance of ADLs  - Assess for home care needs following discharge   - Consider OT consult to assist with ADL evaluation and planning for discharge  - Provide patient education as appropriate  - Monitor functional capacity and physical performance, use of AM PAC & JH-HLM   - Monitor gait, balance and fatigue with ambulation    Outcome: Progressing  Goal: Maintains/Returns to pre admission functional level  Description: INTERVENTIONS:  - Perform AM-PAC 6 Click Basic Mobility/ Daily Activity assessment daily.  - Set and communicate daily mobility goal to care team and patient/family/caregiver.   - Collaborate with rehabilitation services on mobility goals if consulted  -  Perform Range of Motion  times a day.  - Reposition patient every  hours.  - Dangle patient  times a day  - Stand patient times a day  - Ambulate patient  times a day  - Out of bed to chair  times a day   - Out of bed for meals  times a day  - Out of bed for toileting  - Record patient progress and toleration of activity level   Outcome: Progressing     Problem: DISCHARGE PLANNING  Goal: Discharge to home or other facility with appropriate resources  Description: INTERVENTIONS:  - Identify barriers to discharge w/patient and caregiver  - Arrange for needed discharge resources and transportation as appropriate  - Identify discharge learning needs (meds, wound care, etc.)  - Arrange for interpretive services to assist at discharge as needed  - Refer to Case Management Department for coordinating discharge planning if the patient needs post-hospital services based on physician/advanced practitioner order or complex needs related to functional status, cognitive ability, or social support system  Outcome: Progressing     Problem: Knowledge Deficit  Goal: Patient/family/caregiver demonstrates understanding of disease process, treatment plan, medications, and discharge instructions  Description: Complete learning assessment and assess knowledge base.  Interventions:  - Provide teaching at level of understanding  - Provide teaching via preferred learning methods  Outcome: Progressing     Problem: Prexisting or High Potential for Compromised Skin Integrity  Goal: Skin integrity is maintained or improved  Description: INTERVENTIONS:  - Identify patients at risk for skin breakdown  - Assess and monitor skin integrity including under and around medical devices   - Assess and monitor nutrition and hydration status  - Monitor labs  - Assess for incontinence   - Turn and reposition patient  - Assist with mobility/ambulation  - Relieve pressure over sandro prominences   - Avoid friction and shearing  - Provide appropriate hygiene as  needed including keeping skin clean and dry  - Evaluate need for skin moisturizer/barrier cream  - Collaborate with interdisciplinary team  - Patient/family teaching  - Consider wound care consult    Assess:  - Review Benjamín scale daily  - Clean and moisturize skin every   - Inspect skin when repositioning, toileting, and assisting with ADLS  - Assess under medical devices such as  every   - Assess extremities for adequate circulation and sensation     Bed Management:  - Have minimal linens on bed & keep smooth, unwrinkled  - Change linens as needed when moist or perspiring  - Avoid sitting or lying in one position for more than  hours while in bed?Keep HOB at degrees   - Toileting:  - Offer bedside commode  - Assess for incontinence every   - Use incontinent care products after each incontinent episode such as    Activity:  - Mobilize patient  times a day  - Encourage activity and walks on unit  - Encourage or provide ROM exercises   - Turn and reposition patient every  Hours  - Use appropriate equipment to lift or move patient in bed  - Instruct/ Assist with weight shifting every  when out of bed in chair  - Consider limitation of chair time  hour intervals    Skin Care:  - Avoid use of baby powder, tape, friction and shearing, hot water or constrictive clothing  - Relieve pressure over bony prominences using   - Do not massage red bony areas    Next Steps:  - Teach patient strategies to minimize risks such as   - Consider consults to  interdisciplinary teams such as  Outcome: Progressing

## 2025-06-02 NOTE — CASE MANAGEMENT
Case Management    Patient made progress with skilled OT/PT services however continued to require min-mod assistance for ADL's and transfers. Patient needs to be independent in order to discharge home alone. Patient will continue subacute rehab at [facility].

## 2025-06-02 NOTE — ASSESSMENT & PLAN NOTE
Progressively worsening 9/10 dull ache right sided flank pain radiating to her abdomen and behind her leg that started yesterday.  Past three days her urine changed from clear to brown intermittently.     ED course-received 0.7 Dilaudid, Toradol 30 Mg, Zofran, 500 cc NS bolus    VS: on presentation hypertensive 204/101, last BP around for 165/88, satting 93% on 2 L (baseline RA)  Labs: WBC elevated at 16 but seems like this is her baseline in setting of steroid use, hemoglobin stable at 10.9, CMP pretty unremarkable, lipase and Pro-Du WNL  CT abd/pelv:  indicated progression of disease---diffuse metastatic disease, including enlargement of the right renal mass. There is possible invasion of the right renal vein. new mild right hydroureteronephrosis, with slightly increased density within the right ureterovesicular junction which may be blood vs focal lesion.  UA: trace leukocytes and large occult blood, 20-30 RBC, 4-10 WBC with occasional bacteria    Assessment: This is a 74-year-old female with currently 6/10 right-sided flank pain that is slowly becoming sharp in nature.  Unlikely bacterial etiology.  Symptoms likely due to disease progression.    Plan:  Monitor off antibiotics  Follow-up on urine culture  Supportive care   Pain regimen in place as this is likely due to progression of metastatic disease   Palliative care consult in place, plan for goals of care discussion

## 2025-06-02 NOTE — CASE MANAGEMENT
Case Management Discharge Planning Note    Patient name Ayla Nye  Location W /W -01 MRN [id number]  : 1950 Date 2025       Current Admission Date: 2025  Current Admission Diagnosis:Flank pain   Patient Active Problem List    Diagnosis Date Noted    Flank pain 2025    Hematuria 2025    At risk for venous thromboembolism (VTE) 2025    Fall 05/15/2025    Impaired mobility and ADLs 05/15/2025    Vasogenic brain edema (HCC) 2025    Abnormal CT of the chest 2025    Stroke-like symptoms 2025    History of stroke 2025    NSVT (nonsustained ventricular tachycardia) (HCC) 2025    Left-sided weakness 2025    Right loin pain 2025    Complication of chemotherapy/immunotherapy 02/15/2025    GERD (gastroesophageal reflux disease) 2025    Epistaxis 2025    History of Pneumocystis jirovecii pneumonia 2025    IgG deficiency (HCC) 2025    Right kidney mass 2025    History of pulmonary embolism 2024    Dyspnea on exertion 2024    Imbalance 2024    Hyponatremia 2024    Leukocytosis 10/27/2024    Anemia 10/25/2024    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    Palliative care by specialist 2024    Cancer associated pain 2024    Encounter for screening involving social determinants of health (SDoH) 2024    Right hip pain 09/10/2024    Altered mental status 2024    Hypothyroidism 2024    Abnormal imaging of central nervous system 2024    Acute metabolic encephalopathy 2024    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) 2024    Brain mass 2024    Metastatic cancer to brain (HCC) 2024    Dehydration 2024    Moderate protein-calorie malnutrition (HCC) 2024    Bilateral leg pain 2024    Insomnia 2024    Metastatic adenocarcinoma (HCC) 2024    Age-related osteoporosis  without current pathological fracture 11/13/2023    Encounter for monitoring of hydroxyurea therapy 05/03/2023    JAK2 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 05/03/2023    HTN (hypertension) 08/10/2022    Mixed hyperlipidemia 08/10/2022    Essential thrombocytosis (HCC) 07/02/2020    TB lung, 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    Psoriatic arthritis (HCC) 09/10/2019      LOS (days): 1  Geometric Mean LOS (GMLOS) (days): 2.1  Days to GMLOS:1.2     OBJECTIVE:  Risk of Unplanned Readmission Score: 79.99     Current admission status: Inpatient   Preferred Pharmacy:   [id number] - [place] [facility] - [address] [facility]  [address]  [place] [address]  Phone: [phone] Fax: [phone]    [facility] - [place] - [address] [facility]  [address]  [place] [address]  Phone: [phone] Fax: [phone]    Primary Care Provider: Rafy Angelo MD    Primary Insurance: MEDICARE  Secondary Insurance: [facility]    DISCHARGE DETAILS:      CM made aware by Palliative CRNP that patient/family have chosen hospice. CM received hospice consult and placed referral in [facility] to [facility]. CM noted on referral for  Hospice to please contact patient's daughter Juliana.

## 2025-06-02 NOTE — ASSESSMENT & PLAN NOTE
Palliative Diagnosis: Stage IV adenocarcinoma of the lung    Goals:   Code Status: DNR- Level 3  Decisional apparatus:  Patient is competent on my exam today.  If competence is lost, patient's substitute decision maker would default to  adult daughters by [place].  Advance Directive / Living Will / POLST: On file   Palliative will follow for ongoing goals of care discussions as situation evolves.    Social Support:  Patient's support system: family   Supportive listening provided  Normalized experience of patient    Care Coordination  Case discussed with CM    Follow-up  Plan to go to [facility] and transition to hospice  Please do not hesitate to contact our on-call provider through [employer] Secure Chat or contact [phone] should there be an acute change or other symptom control concerns.    Please do not make any changes to symptom medications (opioid analgesics, nonopioid analgesics, antiemetics, etc) without first consulting the on-call Palliative Care provider for your specific campus; including after hours and on weekends. We are available 24/7, and can be contacted on [employer] chat or at [phone].

## 2025-06-02 NOTE — ASSESSMENT & PLAN NOTE
Stage IV adenocarcinoma, pathologic variant in KRAS G 12E and T p53, with brain metastasis on chronic steroids.  First-line therapy with carboplatin, pemetrexed and pembrolizumab was planned for 4 cycles unfortunately she was only able to complete 2 cycles and had required multiple hospitalization for side effects/toxicities, PCP pneumonia and PE.  Started on second line therapy with docetaxel that she was able to receive only 1 cycle on 4/3/2025, cycle 2 was delayed in setting of acute stroke and recent hospitalization.  Discussed imaging results from yesterday that is concerning for disease progression.  Had extensive discussion for third line of therapy if patient remains treatment oriented, has very low efficacy for disease and intent will be palliative not curative.  Patient showed understanding and had expressed her wishes to be comfortable and focus on quality of life rather than being in and out of hospital.  Palliative care team was at bedside during this discussion, who had discussed with patient's daughter Juliana over phone.   I was able to discuss with Juliana over phone as well, she is aware of Ms Nye's decision and in agreement with same.  Patient elected for hospice, which is very much appropriate.

## 2025-06-02 NOTE — ASSESSMENT & PLAN NOTE
Pt presented to [facility] yesterday and pt/family state she was not tended to during night (minimal/no pain meds, left on bed pan, unsafe bed situation etc) and are refusing to return to [facility].   Follow-up with case management for placement  PT/OT eval and treat

## 2025-06-02 NOTE — ASSESSMENT & PLAN NOTE
Recent Labs     05/31/25  1305 06/01/25  0557 06/02/25  0459   HGB 10.9* 10.1* 9.3*     Anemia of chronic disease.  Hemoglobin baseline 9-10.  Continue to monitor.

## 2025-06-02 NOTE — PROGRESS NOTES
Progress Note - Hospitalist   Name: Ayla Nye 74 y.o. female I MRN: [id number]  Unit/Bed#: W -01 I Date of Admission: 5/31/2025   Date of Service: 6/2/2025 I Hospital Day: 1    Assessment & Plan  Flank pain  Progressively worsening 9/10 dull ache right sided flank pain radiating to her abdomen and behind her leg that started yesterday.  Past three days her urine changed from clear to brown intermittently.     ED course-received 0.7 Dilaudid, Toradol 30 Mg, Zofran, 500 cc NS bolus    VS: on presentation hypertensive 204/101, last BP around for 165/88, satting 93% on 2 L (baseline RA)  Labs: WBC elevated at 16 but seems like this is her baseline in setting of steroid use, hemoglobin stable at 10.9, CMP pretty unremarkable, lipase and Pro-Du WNL  CT abd/pelv:  indicated progression of disease---diffuse metastatic disease, including enlargement of the right renal mass. There is possible invasion of the right renal vein. new mild right hydroureteronephrosis, with slightly increased density within the right ureterovesicular junction which may be blood vs focal lesion.  UA: trace leukocytes and large occult blood, 20-30 RBC, 4-10 WBC with occasional bacteria    Assessment: This is a 74-year-old female with currently 6/10 right-sided flank pain that is slowly becoming sharp in nature.  Unlikely bacterial etiology.  Symptoms likely due to disease progression.    Plan:  Monitor off antibiotics  Follow-up on urine culture  Supportive care   Pain regimen in place as this is likely due to progression of metastatic disease   Palliative care consult in place, plan for goals of care discussion   Encounter for screening involving social determinants of health (SDoH)  Pt presented to [facility] yesterday and pt/family state she was not tended to during night (minimal/no pain meds, left on bed pan, unsafe bed situation etc) and are refusing to return to [facility].   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 Treatments complicated by toxicities and infection.   Last treatment few weeks ago in April   MRI brain 5/13 showed stable enhancing lesions in the left occipital and parietal lobe with noted vasogenic edema.  Follows Dr. Castro for oncology, Dr Gamboa for Rad/onc, and follows palliative outpt   C/w decadron 3mg in the morning and 2mg in the evening   C/w Keppra 1000mg BID   C/w Bactrim -160mg MWF  Monitor mental status   Check keppra levels     HTN (hypertension)  Norvasc 10 Mg  IV Labetalol as needed for SBP > 180  Anemia  Recent Labs     05/31/25  1305 06/01/25  0557 06/02/25  0459   HGB 10.9* 10.1* 9.3*     Anemia of chronic disease.  Hemoglobin baseline 9-10.  Continue to monitor.  Cancer associated pain  Continue with home Gabapentin 200 in am and 300 at bedtime, patient is on Narcan, methocarbamol, hydromorphone, oxycodone, MiraLAX, Tylenol  Insomnia  C/w Seroquel 25 mg qHS  Seroquel 12.5 prn for additional support  C/w Remeron 7.5 at bedtime   Optimize sleep hygiene  History of Pneumocystis jirovecii pneumonia  Continue Bactrim -160mg MWF  Hypothyroidism  Continue with home levothyroxine 50 mcg daily  History of stroke  R frontal subacute infarct, noted on imaging on 04/21/25  C/w aspirin & statin.    VTE Pharmacologic Prophylaxis: VTE Score: 7 High Risk (Score >/= 5) - Pharmacological DVT Prophylaxis Ordered: enoxaparin (Lovenox). Sequential Compression Devices Ordered.    Mobility:   Basic Mobility Inpatient Raw Score: 10  JH-HLM Goal: 4: Move to chair/commode  JH-HLM Achieved: 4: Move to chair/commode  JH-HLM Goal achieved. Continue to encourage appropriate mobility.    Patient Centered Rounds: I performed bedside rounds with nursing staff today.   Discussions with Specialists or Other Care Team Provider: Palliative care,  oncology     Education and Discussions with Family / Patient: Updated  (daughter) at bedside.    Current Length of Stay: 1 day(s)  Current Patient Status: Inpatient   Certification Statement: The patient will continue to require additional inpatient hospital stay due to palliative and oncology eval  Discharge Plan: Anticipate discharge in 24-48 hrs to discharge location to be determined pending rehab evaluations.    Code Status: Level 3 - DNAR and DNI    Subjective   Patient seen and examined at bedside, no acute events overnight.  Patient endorses improved abdominal pain from yesterday.  She states she is fatigued this morning.  Otherwise she denies any complaints.    Objective :  Temp:  [98.1 °F (36.7 °C)] 98.1 °F (36.7 °C)  HR:  [80-93] 88  BP: (109-125)/(73-76) 123/73  Resp:  [16-18] 16  SpO2:  [90 %-93 %] 90 %  O2 Device: None (Room air)    Body mass index is 21.77 kg/m².     Input and Output Summary (last 24 hours):     Intake/Output Summary (Last 24 hours) at 6/2/2025 1543  Last data filed at 6/2/2025 1215  Gross per 24 hour   Intake 1210 ml   Output 28 ml   Net 1182 ml       Physical Exam  Constitutional:       General: She is not in acute distress.     Appearance: She is ill-appearing.   HENT:      Mouth/Throat:      Mouth: Mucous membranes are moist.      Pharynx: Oropharynx is clear.     Cardiovascular:      Rate and Rhythm: Normal rate and regular rhythm.      Pulses: Normal pulses.      Heart sounds: Normal heart sounds. No murmur heard.  Pulmonary:      Effort: Pulmonary effort is normal. No respiratory distress.      Breath sounds: No wheezing, rhonchi or rales.   Abdominal:      General: There is no distension.      Palpations: Abdomen is soft.      Tenderness: There is no abdominal tenderness. There is no guarding or rebound.     Musculoskeletal:      Right lower leg: No edema.      Left lower leg: No edema.     Skin:     General: Skin is warm and dry.     Neurological:      Mental  Status: She is oriented to person, place, and time.      Cranial Nerves: No cranial nerve deficit.      Motor: Weakness present.      Comments: 0/5 muscle strength left upper and lower extremity   Psychiatric:         Mood and Affect: Mood normal.         Behavior: Behavior normal.           Lines/Drains:              Lab Results: I have reviewed the following results:   Results from last 7 days   Lab Units 06/02/25 0459 06/01/25  0557   WBC Thousand/uL 15.13* 17.43*   HEMOGLOBIN g/dL 9.3* 10.1*   HEMATOCRIT % 30.6* 33.1*   PLATELETS Thousands/uL 254 269   BANDS PCT %  --  6   LYMPHO PCT % 1* 4*   MONO PCT % 4 3*   EOS PCT % 0 0     Results from last 7 days   Lab Units 06/02/25 0459 06/01/25  0557 05/31/25  1305   SODIUM mmol/L 137   < > 136   POTASSIUM mmol/L 5.0   < > 4.3   CHLORIDE mmol/L 104   < > 104   CO2 mmol/L 25   < > 23   BUN mg/dL 40*   < > 29*   CREATININE mg/dL 0.95   < > 0.84   ANION GAP mmol/L 8   < > 9   CALCIUM mg/dL 8.5   < > 9.4   ALBUMIN g/dL  --   --  3.5   TOTAL BILIRUBIN mg/dL  --   --  0.19*   ALK PHOS U/L  --   --  52   ALT U/L  --   --  12   AST U/L  --   --  9*   GLUCOSE RANDOM mg/dL 127   < > 123    < > = values in this interval not displayed.                 Results from last 7 days   Lab Units 05/31/25  1305   PROCALCITONIN ng/ml 0.16       Recent Cultures (last 7 days):               Last 24 Hours Medication List:     Current Facility-Administered Medications:     acetaminophen (TYLENOL) tablet 975 mg, Q8H KRISS    aluminum-magnesium hydroxide-simethicone (MAALOX) oral suspension 30 mL, Q4H PRN    amLODIPine (NORVASC) tablet 10 mg, Daily    aspirin chewable tablet 81 mg, Daily    atorvastatin (LIPITOR) tablet 40 mg, Daily With Dinner    bisacodyl (DULCOLAX) rectal suppository 10 mg, Daily PRN    cyanocobalamin (VITAMIN B-12) tablet 1,000 mcg, Daily    dexamethasone (DECADRON) tablet 2 mg, Daily **AND** dexamethasone (DECADRON) tablet 3 mg, Daily    enoxaparin (LOVENOX) subcutaneous  injection 40 mg, Daily    gabapentin (NEURONTIN) capsule 200 mg, Daily **AND** gabapentin (NEURONTIN) capsule 300 mg, HS    HYDROmorphone (DILAUDID) tablet 2 mg, Q4H PRN    HYDROmorphone (DILAUDID) tablet 4 mg, Q4H PRN    HYDROmorphone (DILAUDID) tablet 4 mg, HS    HYDROmorphone HCl (DILAUDID) injection 0.2 mg, Q3H PRN    labetalol (NORMODYNE) injection 10 mg, Q6H PRN    levETIRAcetam (KEPPRA) tablet 1,000 mg, BID    levothyroxine tablet 50 mcg, Early Morning    lidocaine (LIDODERM) 5 % patch 1 patch, Daily    LORazepam (ATIVAN) injection 2 mg, Q8H PRN    methocarbamol (ROBAXIN) tablet 250 mg, Q6H PRN    mirtazapine (REMERON) tablet 7.5 mg, HS    naloxone (NARCAN) intranasal 2 mg, Daily PRN    pantoprazole (PROTONIX) EC tablet 40 mg, Daily Before Breakfast    polyethylene glycol (MIRALAX) packet 17 g, Daily    QUEtiapine (SEROquel) tablet 12.5 mg, Daily PRN    QUEtiapine (SEROquel) tablet 25 mg, HS    senna (SENOKOT) tablet 17.2 mg, Daily    sulfamethoxazole-trimethoprim (BACTRIM DS) 800-160 mg per tablet 1 tablet, Once per day on Monday Wednesday Friday    Administrative Statements   Today, Patient Was Seen By: Greg Isaac Jr, DO      **Please Note: This note may have been constructed using a voice recognition system.**

## 2025-06-02 NOTE — ASSESSMENT & PLAN NOTE
Patient presented with flank pain, CT abdomen pelvis remarkable for rapid interval progression of right renal mass with possible invasion of right renal vein.  Palliative consulted for pain management.  Patient opted for hospice.

## 2025-06-02 NOTE — PLAN OF CARE
Problem: PAIN - ADULT  Goal: Verbalizes/displays adequate comfort level or baseline comfort level  Description: Interventions:  - Encourage patient to monitor pain and request assistance  - Assess pain using appropriate pain scale  - Administer analgesics as ordered based on type and severity of pain and evaluate response  - Implement non-pharmacological measures as appropriate and evaluate response  - Consider cultural and social influences on pain and pain management  - Notify physician/advanced practitioner if interventions unsuccessful or patient reports new pain  - Educate patient/family on pain management process including their role and importance of  reporting pain   - Provide non-pharmacologic/complimentary pain relief interventions  Outcome: Progressing     Problem: INFECTION - ADULT  Goal: Absence or prevention of progression during hospitalization  Description: INTERVENTIONS:  - Assess and monitor for signs and symptoms of infection  - Monitor lab/diagnostic results  - Monitor all insertion sites, i.e. indwelling lines, tubes, and drains  - Monitor endotracheal if appropriate and nasal secretions for changes in amount and color  - [place] appropriate cooling/warming therapies per order  - Administer medications as ordered  - Instruct and encourage patient and family to use good hand hygiene technique  - Identify and instruct in appropriate isolation precautions for identified infection/condition  Outcome: Progressing  Goal: Absence of fever/infection during neutropenic period  Description: INTERVENTIONS:  - Monitor WBC  - Perform strict hand hygiene  - Limit to healthy visitors only  - No plants, dried, fresh or silk flowers with patterson in patient room  Outcome: Progressing     Problem: SAFETY ADULT  Goal: Patient will remain free of falls  Description: INTERVENTIONS:  - Educate patient/family on patient safety including physical limitations  - Instruct patient to call for assistance with activity   -  Consider consulting OT/PT to assist with strengthening/mobility based on AM PAC & JH-HLM score  - Consult OT/PT to assist with strengthening/mobility   - Keep Call bell within reach  - Keep bed low and locked with side rails adjusted as appropriate  - Keep care items and personal belongings within reach  - Initiate and maintain comfort rounds  - Make Fall Risk Sign visible to staff  - Offer Toileting every  Hours, in advance of need  - Initiate/Maintain alarm  - Obtain necessary fall risk management equipment:   - Apply yellow socks and bracelet for high fall risk patients  - Consider moving patient to room near nurses station  Outcome: Progressing  Goal: Maintain or return to baseline ADL function  Description: INTERVENTIONS:  -  Assess patient's ability to carry out ADLs; assess patient's baseline for ADL function and identify physical deficits which impact ability to perform ADLs (bathing, care of mouth/teeth, toileting, grooming, dressing, etc.)  - Assess/evaluate cause of self-care deficits   - Assess range of motion  - Assess patient's mobility; develop plan if impaired  - Assess patient's need for assistive devices and provide as appropriate  - Encourage maximum independence but intervene and supervise when necessary  - Involve family in performance of ADLs  - Assess for home care needs following discharge   - Consider OT consult to assist with ADL evaluation and planning for discharge  - Provide patient education as appropriate  - Monitor functional capacity and physical performance, use of AM PAC & JH-HLM   - Monitor gait, balance and fatigue with ambulation    Outcome: Progressing  Goal: Maintains/Returns to pre admission functional level  Description: INTERVENTIONS:  - Perform AM-PAC 6 Click Basic Mobility/ Daily Activity assessment daily.  - Set and communicate daily mobility goal to care team and patient/family/caregiver.   - Collaborate with rehabilitation services on mobility goals if consulted  -  Perform Range of Motion  times a day.  - Reposition patient every  hours.  - Dangle patient  times a day  - Stand patient  times a day  - Ambulate patient  times a day  - Out of bed to chair  times a day   - Out of bed for meals  times a day  - Out of bed for toileting  - Record patient progress and toleration of activity level   Outcome: Progressing     Problem: DISCHARGE PLANNING  Goal: Discharge to home or other facility with appropriate resources  Description: INTERVENTIONS:  - Identify barriers to discharge w/patient and caregiver  - Arrange for needed discharge resources and transportation as appropriate  - Identify discharge learning needs (meds, wound care, etc.)  - Arrange for interpretive services to assist at discharge as needed  - Refer to Case Management Department for coordinating discharge planning if the patient needs post-hospital services based on physician/advanced practitioner order or complex needs related to functional status, cognitive ability, or social support system  Outcome: Progressing     Problem: Knowledge Deficit  Goal: Patient/family/caregiver demonstrates understanding of disease process, treatment plan, medications, and discharge instructions  Description: Complete learning assessment and assess knowledge base.  Interventions:  - Provide teaching at level of understanding  - Provide teaching via preferred learning methods  Outcome: Progressing     Problem: Prexisting or High Potential for Compromised Skin Integrity  Goal: Skin integrity is maintained or improved  Description: INTERVENTIONS:  - Identify patients at risk for skin breakdown  - Assess and monitor skin integrity including under and around medical devices   - Assess and monitor nutrition and hydration status  - Monitor labs  - Assess for incontinence   - Turn and reposition patient  - Assist with mobility/ambulation  - Relieve pressure over sandro prominences   - Avoid friction and shearing  - Provide appropriate hygiene  as needed including keeping skin clean and dry  - Evaluate need for skin moisturizer/barrier cream  - Collaborate with interdisciplinary team  - Patient/family teaching  - Consider wound care consult    Assess:  - Review Benjamín scale daily  - Clean and moisturize skin every   - Inspect skin when repositioning, toileting, and assisting with ADLS  - Assess under medical devices such as every   - Assess extremities for adequate circulation and sensation     Bed Management:  - Have minimal linens on bed & keep smooth, unwrinkled  - Change linens as needed when moist or perspiring  - Avoid sitting or lying in one position for more than  hours while in bed?Keep HOB at degrees   - Toileting:  - Offer bedside commode  - Assess for incontinence every   - Use incontinent care products after each incontinent episode such as     Activity:  - Mobilize patient  times a day  - Encourage activity and walks on unit  - Encourage or provide ROM exercises   - Turn and reposition patient every  Hours  - Use appropriate equipment to lift or move patient in bed  - Instruct/ Assist with weight shifting every  when out of bed in chair  - Consider limitation of chair maki hour intervals    Skin Care:  - Avoid use of baby powder, tape, friction and shearing, hot water or constrictive clothing  - Relieve pressure over bony prominences using   - Do not massage red bony areas    Next Steps:  - Teach patient strategies to minimize risks such as   - Consider consults to  interdisciplinary teams such as   Outcome: Progressing

## 2025-06-02 NOTE — HOSPICE NOTE
Hospice referral received, Pt is approved for RLOC for hospice which can be done at home or in a SNF. Spoke with daughter Juliana and I will be meeting with patient and family tomorrow. Update Chiquis RAMIREZ of the same.

## 2025-06-03 LAB
ANISOCYTOSIS BLD QL SMEAR: PRESENT
BASOPHILS # BLD MANUAL: 0 THOUSAND/UL (ref 0–0.1)
BASOPHILS NFR MAR MANUAL: 0 % (ref 0–1)
EOSINOPHIL # BLD MANUAL: 0.15 THOUSAND/UL (ref 0–0.4)
EOSINOPHIL NFR BLD MANUAL: 1 % (ref 0–6)
ERYTHROCYTE [DISTWIDTH] IN BLOOD BY AUTOMATED COUNT: 16.8 % (ref 11.6–15.1)
HCT VFR BLD AUTO: 29.5 % (ref 34.8–46.1)
HGB BLD-MCNC: 8.9 G/DL (ref 11.5–15.4)
LYMPHOCYTES # BLD AUTO: 0.3 THOUSAND/UL (ref 0.6–4.47)
LYMPHOCYTES # BLD AUTO: 2 % (ref 14–44)
MCH RBC QN AUTO: 28.2 PG (ref 26.8–34.3)
MCHC RBC AUTO-ENTMCNC: 30.2 G/DL (ref 31.4–37.4)
MCV RBC AUTO: 93 FL (ref 82–98)
MONOCYTES # BLD AUTO: 0.89 THOUSAND/UL (ref 0–1.22)
MONOCYTES NFR BLD: 6 % (ref 4–12)
MRSA NOSE QL CULT: NORMAL
NEUTROPHILS # BLD MANUAL: 13.45 THOUSAND/UL (ref 1.85–7.62)
NEUTS BAND NFR BLD MANUAL: 3 % (ref 0–8)
NEUTS SEG NFR BLD AUTO: 88 % (ref 43–75)
PLATELET # BLD AUTO: 256 THOUSANDS/UL (ref 149–390)
PLATELET BLD QL SMEAR: ADEQUATE
PMV BLD AUTO: 9.9 FL (ref 8.9–12.7)
POIKILOCYTOSIS BLD QL SMEAR: PRESENT
POLYCHROMASIA BLD QL SMEAR: PRESENT
RBC # BLD AUTO: 3.16 MILLION/UL (ref 3.81–5.12)
RBC MORPH BLD: PRESENT
WBC # BLD AUTO: 14.78 THOUSAND/UL (ref 4.31–10.16)

## 2025-06-03 PROCEDURE — 85027 COMPLETE CBC AUTOMATED: CPT | Performed by: INTERNAL MEDICINE

## 2025-06-03 PROCEDURE — 99232 SBSQ HOSP IP/OBS MODERATE 35: CPT | Performed by: HOSPITALIST

## 2025-06-03 PROCEDURE — 85007 BL SMEAR W/DIFF WBC COUNT: CPT | Performed by: INTERNAL MEDICINE

## 2025-06-03 PROCEDURE — 97167 OT EVAL HIGH COMPLEX 60 MIN: CPT

## 2025-06-03 RX ADMIN — DEXAMETHASONE 2 MG: 2 TABLET ORAL at 15:39

## 2025-06-03 RX ADMIN — ASPIRIN 81 MG: 81 TABLET, CHEWABLE ORAL at 09:11

## 2025-06-03 RX ADMIN — QUETIAPINE 25 MG: 25 TABLET ORAL at 21:58

## 2025-06-03 RX ADMIN — BISACODYL 10 MG: 10 SUPPOSITORY RECTAL at 15:56

## 2025-06-03 RX ADMIN — PANTOPRAZOLE SODIUM 40 MG: 40 TABLET, DELAYED RELEASE ORAL at 05:06

## 2025-06-03 RX ADMIN — GABAPENTIN 300 MG: 300 CAPSULE ORAL at 21:58

## 2025-06-03 RX ADMIN — MIRTAZAPINE 7.5 MG: 15 TABLET, FILM COATED ORAL at 20:25

## 2025-06-03 RX ADMIN — POLYETHYLENE GLYCOL 3350 17 G: 17 POWDER, FOR SOLUTION ORAL at 09:12

## 2025-06-03 RX ADMIN — AMLODIPINE BESYLATE 10 MG: 10 TABLET ORAL at 09:11

## 2025-06-03 RX ADMIN — ACETAMINOPHEN 975 MG: 325 TABLET ORAL at 21:58

## 2025-06-03 RX ADMIN — ACETAMINOPHEN 975 MG: 325 TABLET ORAL at 05:06

## 2025-06-03 RX ADMIN — LEVOTHYROXINE SODIUM 50 MCG: 0.05 TABLET ORAL at 05:05

## 2025-06-03 RX ADMIN — ATORVASTATIN CALCIUM 40 MG: 40 TABLET, FILM COATED ORAL at 15:39

## 2025-06-03 RX ADMIN — LEVETIRACETAM 1000 MG: 500 TABLET, FILM COATED ORAL at 09:11

## 2025-06-03 RX ADMIN — ENOXAPARIN SODIUM 40 MG: 40 INJECTION SUBCUTANEOUS at 09:12

## 2025-06-03 RX ADMIN — LIDOCAINE 1 PATCH: 700 PATCH TOPICAL at 09:12

## 2025-06-03 RX ADMIN — HYDROMORPHONE HYDROCHLORIDE 4 MG: 4 TABLET ORAL at 21:58

## 2025-06-03 RX ADMIN — SENNOSIDES 17.2 MG: 8.6 TABLET, FILM COATED ORAL at 09:11

## 2025-06-03 RX ADMIN — LEVETIRACETAM 1000 MG: 500 TABLET, FILM COATED ORAL at 20:25

## 2025-06-03 RX ADMIN — GABAPENTIN 200 MG: 100 CAPSULE ORAL at 09:11

## 2025-06-03 RX ADMIN — HYDROMORPHONE HYDROCHLORIDE 2 MG: 2 TABLET ORAL at 10:28

## 2025-06-03 RX ADMIN — ACETAMINOPHEN 975 MG: 325 TABLET ORAL at 13:55

## 2025-06-03 RX ADMIN — CYANOCOBALAMIN TAB 500 MCG 1000 MCG: 500 TAB at 09:11

## 2025-06-03 NOTE — PROGRESS NOTES
Progress Note - Hospitalist   Name: Ayla Nye 74 y.o. female I MRN: [id number]  Unit/Bed#: W -01 I Date of Admission: 5/31/2025   Date of Service: 6/3/2025 I Hospital Day: 2    Assessment & Plan  Flank pain  Progressively worsening 9/10 dull ache right sided flank pain radiating to her abdomen and behind her leg that started yesterday.  Past three days her urine changed from clear to brown intermittently.     ED course-received 0.7 Dilaudid, Toradol 30 Mg, Zofran, 500 cc NS bolus    VS: on presentation hypertensive 204/101, last BP around for 165/88, satting 93% on 2 L (baseline RA)  Labs: WBC elevated at 16 but seems like this is her baseline in setting of steroid use, hemoglobin stable at 10.9, CMP pretty unremarkable, lipase and Pro-Du WNL  CT abd/pelv:  indicated progression of disease---diffuse metastatic disease, including enlargement of the right renal mass. There is possible invasion of the right renal vein. new mild right hydroureteronephrosis, with slightly increased density within the right ureterovesicular junction which may be blood vs focal lesion.  UA: trace leukocytes and large occult blood, 20-30 RBC, 4-10 WBC with occasional bacteria    Assessment: This is a 74-year-old female with currently 6/10 right-sided flank pain that is slowly becoming sharp in nature.  Unlikely bacterial etiology.  Symptoms likely due to disease progression.    Plan:  Monitor off antibiotics  Follow-up on urine culture  Supportive care   Pain regimen in place as this is likely due to progression of metastatic disease   Palliative care consult in place, plan for goals of care discussion   Patient and family have elected for rehab placement prior to initiation of hospice care. Discharge planning   Encounter for screening involving social determinants of health (SDoH)  Pt presented to [facility] yesterday and pt/family state she was not tended to during night (minimal/no pain meds, left on bed pan,  unsafe bed situation etc) and are refusing to return to [facility].   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 Treatments complicated by toxicities and infection.   Last treatment few weeks ago in April   MRI brain 5/13 showed stable enhancing lesions in the left occipital and parietal lobe with noted vasogenic edema.  Follows Dr. Castro for oncology, Dr Gamboa for Rad/onc, and follows palliative outpt   C/w decadron 3mg in the morning and 2mg in the evening   C/w Keppra 1000mg BID   C/w Bactrim -160mg MWF  Monitor mental status   Keppra levels supratherapeutic - no changes in regimen at this time    HTN (hypertension)  Norvasc 10 Mg  IV Labetalol as needed for SBP > 180  Anemia  Recent Labs     06/01/25  0557 06/02/25  0459 06/03/25  0502   HGB 10.1* 9.3* 8.9*     Anemia of chronic disease.  Hemoglobin baseline 9-10.  Continue to monitor.  Cancer associated pain  Continue with home Gabapentin 200 in am and 300 at bedtime, patient is on Narcan, methocarbamol, hydromorphone, oxycodone, MiraLAX, Tylenol  Insomnia  C/w Seroquel 25 mg qHS  Seroquel 12.5 prn for additional support  C/w Remeron 7.5 at bedtime   Optimize sleep hygiene  History of Pneumocystis jirovecii pneumonia  Continue Bactrim -160mg MWF  Hypothyroidism  Continue with home levothyroxine 50 mcg daily  History of stroke  R frontal subacute infarct, noted on imaging on 04/21/25  C/w aspirin & statin.    VTE Pharmacologic Prophylaxis: VTE Score: 7 High Risk (Score >/= 5) - Pharmacological DVT Prophylaxis Ordered: enoxaparin (Lovenox). Sequential Compression Devices Ordered.    Mobility:   Basic Mobility Inpatient Raw Score: 10  JH-HLM Goal: 4: Move to chair/commode  JH-HLM Achieved: 4: Move to chair/commode  JH-HLM Goal achieved. Continue to encourage appropriate mobility.    Patient  Centered Rounds: I performed bedside rounds with nursing staff today.   Discussions with Specialists or Other Care Team Provider: Palliative care, hospice services, oncology    Education and Discussions with Family / Patient: Updated  (daughter) via phone.    Current Length of Stay: 2 day(s)  Current Patient Status: Inpatient   Certification Statement: Patient medically stable for discharge, pending rehab placement  Discharge Plan: Medically stable for discharge pending rehab placement    Code Status: Level 3 - DNAR and DNI    Subjective   Patient seen and examined at bedside, no acute events overnight.  She endorses no pain, or discomforts today.  She states she was able to sleep well.  She has a good appetite and is able to tolerate her diet well.    Objective :  Temp:  [97.6 °F (36.4 °C)-98.3 °F (36.8 °C)] 98.2 °F (36.8 °C)  HR:  [80-88] 80  BP: (117-132)/(73-77) 117/74  SpO2:  [89 %-91 %] 91 %  O2 Device: None (Room air)    Body mass index is 21.77 kg/m².     Input and Output Summary (last 24 hours):     Intake/Output Summary (Last 24 hours) at 6/3/2025 1457  Last data filed at 6/3/2025 1216  Gross per 24 hour   Intake 720 ml   Output 100 ml   Net 620 ml       Physical Exam  Constitutional:       General: She is not in acute distress.     Appearance: She is ill-appearing.   HENT:      Mouth/Throat:      Mouth: Mucous membranes are moist.      Pharynx: Oropharynx is clear.     Cardiovascular:      Rate and Rhythm: Normal rate and regular rhythm.      Pulses: Normal pulses.      Heart sounds: Normal heart sounds. No murmur heard.  Pulmonary:      Effort: Pulmonary effort is normal. No respiratory distress.      Breath sounds: Normal breath sounds. No wheezing, rhonchi or rales.   Abdominal:      General: There is no distension.      Palpations: Abdomen is soft.      Tenderness: There is abdominal tenderness. There is no guarding or rebound.      Comments: Mild tenderness to palpation right side of  abdomen no guarding     Musculoskeletal:      Right lower leg: No edema.      Left lower leg: No edema.     Skin:     General: Skin is warm and dry.     Neurological:      Mental Status: She is alert. Mental status is at baseline.      Motor: Weakness present.      Comments: 0/5 strength left upper and lower extremity with sensory deficit   Psychiatric:         Mood and Affect: Mood normal.         Behavior: Behavior normal.           Lines/Drains:              Lab Results: I have reviewed the following results:   Results from last 7 days   Lab Units 06/03/25  0502   WBC Thousand/uL 14.78*   HEMOGLOBIN g/dL 8.9*   HEMATOCRIT % 29.5*   PLATELETS Thousands/uL 256   BANDS PCT % 3   LYMPHO PCT % 2*   MONO PCT % 6   EOS PCT % 1     Results from last 7 days   Lab Units 06/02/25  0459 06/01/25  0557 05/31/25  1305   SODIUM mmol/L 137   < > 136   POTASSIUM mmol/L 5.0   < > 4.3   CHLORIDE mmol/L 104   < > 104   CO2 mmol/L 25   < > 23   BUN mg/dL 40*   < > 29*   CREATININE mg/dL 0.95   < > 0.84   ANION GAP mmol/L 8   < > 9   CALCIUM mg/dL 8.5   < > 9.4   ALBUMIN g/dL  --   --  3.5   TOTAL BILIRUBIN mg/dL  --   --  0.19*   ALK PHOS U/L  --   --  52   ALT U/L  --   --  12   AST U/L  --   --  9*   GLUCOSE RANDOM mg/dL 127   < > 123    < > = values in this interval not displayed.                 Results from last 7 days   Lab Units 05/31/25  1305   PROCALCITONIN ng/ml 0.16       Recent Cultures (last 7 days):               Last 24 Hours Medication List:     Current Facility-Administered Medications:     acetaminophen (TYLENOL) tablet 975 mg, Q8H KRISS    aluminum-magnesium hydroxide-simethicone (MAALOX) oral suspension 30 mL, Q4H PRN    amLODIPine (NORVASC) tablet 10 mg, Daily    aspirin chewable tablet 81 mg, Daily    atorvastatin (LIPITOR) tablet 40 mg, Daily With Dinner    bisacodyl (DULCOLAX) rectal suppository 10 mg, Daily PRN    cyanocobalamin (VITAMIN B-12) tablet 1,000 mcg, Daily    dexamethasone (DECADRON) tablet 2 mg,  Daily **AND** dexamethasone (DECADRON) tablet 3 mg, Daily    enoxaparin (LOVENOX) subcutaneous injection 40 mg, Daily    gabapentin (NEURONTIN) capsule 200 mg, Daily **AND** gabapentin (NEURONTIN) capsule 300 mg, HS    HYDROmorphone (DILAUDID) tablet 2 mg, Q4H PRN    HYDROmorphone (DILAUDID) tablet 4 mg, Q4H PRN    HYDROmorphone (DILAUDID) tablet 4 mg, HS    HYDROmorphone HCl (DILAUDID) injection 0.2 mg, Q3H PRN    labetalol (NORMODYNE) injection 10 mg, Q6H PRN    levETIRAcetam (KEPPRA) tablet 1,000 mg, BID    levothyroxine tablet 50 mcg, Early Morning    lidocaine (LIDODERM) 5 % patch 1 patch, Daily    LORazepam (ATIVAN) injection 2 mg, Q8H PRN    methocarbamol (ROBAXIN) tablet 250 mg, Q6H PRN    mirtazapine (REMERON) tablet 7.5 mg, HS    naloxone (NARCAN) intranasal 2 mg, Daily PRN    pantoprazole (PROTONIX) EC tablet 40 mg, Daily Before Breakfast    polyethylene glycol (MIRALAX) packet 17 g, Daily    QUEtiapine (SEROquel) tablet 12.5 mg, Daily PRN    QUEtiapine (SEROquel) tablet 25 mg, HS    senna (SENOKOT) tablet 17.2 mg, Daily    sulfamethoxazole-trimethoprim (BACTRIM DS) 800-160 mg per tablet 1 tablet, Once per day on Monday Wednesday Friday    Administrative Statements   Today, Patient Was Seen By: Greg Isaac Jr, DO      **Please Note: This note may have been constructed using a voice recognition system.**

## 2025-06-03 NOTE — CASE MANAGEMENT
Case Management Discharge Planning Note    Patient name Ayla Nye  Location W /W -01 MRN [id number]  : 1950 Date 6/3/2025       Current Admission Date: 2025  Current Admission Diagnosis:Flank pain   Patient Active Problem List    Diagnosis Date Noted    Flank pain 2025    Hematuria 2025    At risk for venous thromboembolism (VTE) 2025    Fall 05/15/2025    Impaired mobility and ADLs 05/15/2025    Vasogenic brain edema (HCC) 2025    Abnormal CT of the chest 2025    Stroke-like symptoms 2025    History of stroke 2025    NSVT (nonsustained ventricular tachycardia) (HCC) 2025    Left-sided weakness 2025    Right loin pain 2025    Complication of chemotherapy/immunotherapy 02/15/2025    GERD (gastroesophageal reflux disease) 2025    Epistaxis 2025    History of Pneumocystis jirovecii pneumonia 2025    IgG deficiency (HCC) 2025    Right kidney mass 2025    History of pulmonary embolism 2024    Dyspnea on exertion 2024    Imbalance 2024    Hyponatremia 2024    Leukocytosis 10/27/2024    Anemia 10/25/2024    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    Palliative care by specialist 2024    Cancer associated pain 2024    Encounter for screening involving social determinants of health (SDoH) 2024    Right hip pain 09/10/2024    Altered mental status 2024    Hypothyroidism 2024    Abnormal imaging of central nervous system 2024    Acute metabolic encephalopathy 2024    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) 2024    Brain mass 2024    Metastatic cancer to brain (HCC) 2024    Dehydration 2024    Moderate protein-calorie malnutrition (HCC) 2024    Bilateral leg pain 2024    Insomnia 2024    Metastatic adenocarcinoma (HCC) 2024    Age-related osteoporosis  without current pathological fracture 11/13/2023    Encounter for monitoring of hydroxyurea therapy 05/03/2023    JAK2 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 05/03/2023    HTN (hypertension) 08/10/2022    Mixed hyperlipidemia 08/10/2022    Essential thrombocytosis (HCC) 07/02/2020    TB lung, 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    Psoriatic arthritis (HCC) 09/10/2019      LOS (days): 2  Geometric Mean LOS (GMLOS) (days): 2.1  Days to GMLOS:0.1     OBJECTIVE:  Risk of Unplanned Readmission Score: 80.86         Current admission status: Inpatient   Preferred Pharmacy:   [id number] - [facility] [place] - [address] [facility]  [address] [facility] [address]  Phone: [phone] Fax: [phone]    [facility] [place] - [address]  [address] [facility] [address]  Phone: [phone] Fax: [phone]    Primary Care Provider: Rafy Angelo MD    Primary Insurance: MEDICARE  Secondary Insurance: AARP    DISCHARGE DETAILS:     CM spoke with pt's daughter in order to discuss DCP.  Daughter confirmed she would like to have pt go to  for rehab upon DC from the hospital.   explained  is requesting a financial application be completed for their review.  Daughter is willing to assist with completing this application.  She is out of town today however will be home this evening.  EROS suggested the financial application be emailed to the daughter and she could complete it.  She stated she would be able to do that and return it to  tomorrow morning.  EROS emailed application to [email or web address].      Family is considering hospice services however they have not made a decision at this time.  They would like for pt to go to rehab first.  Cm will follow up with pt's daughter tomorrow AM.

## 2025-06-03 NOTE — ASSESSMENT & PLAN NOTE
Progressively worsening 9/10 dull ache right sided flank pain radiating to her abdomen and behind her leg that started yesterday.  Past three days her urine changed from clear to brown intermittently.     ED course-received 0.7 Dilaudid, Toradol 30 Mg, Zofran, 500 cc NS bolus    VS: on presentation hypertensive 204/101, last BP around for 165/88, satting 93% on 2 L (baseline RA)  Labs: WBC elevated at 16 but seems like this is her baseline in setting of steroid use, hemoglobin stable at 10.9, CMP pretty unremarkable, lipase and Pro-Du WNL  CT abd/pelv:  indicated progression of disease---diffuse metastatic disease, including enlargement of the right renal mass. There is possible invasion of the right renal vein. new mild right hydroureteronephrosis, with slightly increased density within the right ureterovesicular junction which may be blood vs focal lesion.  UA: trace leukocytes and large occult blood, 20-30 RBC, 4-10 WBC with occasional bacteria    Assessment: This is a 74-year-old female with currently 6/10 right-sided flank pain that is slowly becoming sharp in nature.  Unlikely bacterial etiology.  Symptoms likely due to disease progression.    Plan:  Monitor off antibiotics  Follow-up on urine culture  Supportive care   Pain regimen in place as this is likely due to progression of metastatic disease   Palliative care consult in place, plan for goals of care discussion   Patient and family have elected for rehab placement prior to initiation of hospice care. Discharge planning

## 2025-06-03 NOTE — OCCUPATIONAL THERAPY NOTE
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)    Hypothyroidism    Encounter for screening involving social determinants of health (SDoH)    Cancer associated pain    Palliative care by specialist    Anemia    History of Pneumocystis jirovecii pneumonia    History of stroke    Past Medical History  Past Medical History[1]  Past Surgical History  Past Surgical History[2]          06/03/25 1336   OT Last Visit   OT Visit Date 06/03/25   Note Type   Note type Evaluation   Pain Assessment   Pain Assessment Tool 0-10   Pain Score 4   Pain Location/Orientation Location: Abdomen   Pain Onset/Description Onset: Ongoing   Patient's Stated Pain Goal No pain   Hospital Pain Intervention(s) Medication (See MAR);Repositioned   Multiple Pain Sites No   Restrictions/Precautions   Weight Bearing Precautions Per Order No   Braces or Orthoses   (per Pt : rehab was utilizing sling for shoulder and arm protection)   Other Precautions Chair Alarm;Bed Alarm;Cognitive;Fall Risk;Visual impairment   Home Living   Type of Home House   Home Layout Two level  (no first floor bathroom-has commode, 6 HARRY)   Bathroom Shower/Tub Tub/shower unit   Bathroom Toilet Raised   Bathroom Equipment Shower chair;Commode   Home Equipment Walker;Cane  (rolling walker and standard walker)   Additional Comments Pt reports that they may plan to get a hospital bed in future   Prior Function   Level of [place] Independent with functional mobility  (true baseline)   Lives With (S)  Alone   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;Personal care attendant   IADLs Family/Friend/Other provides transportation;Family/Friend/Other provides meals;Family/Friend/Other provides medication management   Vocational Retired   Lifestyle   Autonomy lives alone, had HHA 5 days a week  ~7 hr a day that assist with ADLs, lite cooking/cleaning, family does shopping   Reciprocal Relationships lives alone, has two supportive dtrs ~hr away, HHA 5hrs/day   Service to Others Retired, previously working for [employer]   Intrinsic Gratification watching TV, her family   General   Family/Caregiver Present Yes   ADL   Eating Assistance 7  Independent   Grooming Assistance 4  Minimal Assistance   Grooming Deficit Setup;Supervision/safety;Verbal cueing;Increased time to complete   UB Dressing Assistance 3  Moderate Assistance   UB Dressing Deficit   (gown)   LB Dressing Assistance 2  Maximal Assistance   LB Dressing Deficit Don/doff L sock;Don/doff R sock   Bed Mobility   Rolling L 2  Maximal assistance   Additional items Assist x 1;Increased time required;LE management;Verbal cues;Bedrails;HOB elevated   Supine to Sit 2  Maximal assistance   Additional items Assist x 1;Increased time required;LE management;Verbal cues;Bedrails;HOB elevated   Sit to Supine 2  Maximal assistance   Additional items Assist x 1;Bedrails;Verbal cues   Balance   Static Sitting Poor +   Dynamic Sitting Poor -   Activity Tolerance   Activity Tolerance Patient limited by fatigue   Nurse Made Aware yes   RUE Assessment   RUE Assessment WFL  (R hand dominant)   LUE Assessment   LUE Assessment   (slight movement at fingers/hand.)   Cognition   Overall Cognitive Status Impaired   Arousal/Participation Alert;Cooperative   Attention Attends with cues to redirect   Orientation Level Oriented X4   Memory Within functional limits   Following Commands Follows one step commands without difficulty   Assessment   Limitation Decreased ADL status;Decreased UE strength;Decreased UE ROM;Decreased endurance;Decreased sensation;Decreased self-care trans;Decreased high-level ADLs;Decreased fine motor control;Non-func L UE   Prognosis Fair   Assessment Pt is a 74 y.o. female seen for OT evaluation s/p admission to [facility] on 5/31/2025  due to R sided flank pain, rad to abdomen and behind her leg in pt w/ stage IV CA of lung w/ brain mets, recent CVA. Diagnosed with Flank pain. Personal and env factors supporting pt at time of IE include supportive family, attitude towards recovery, and (A) with all ADLs.  PMH: stage IV adenocarcinoma of lung with metastasis to brain, CVA. Pt was living alone with HHA 5 days a week/7 hours a day in a [facility], [facility] level for self help skills (recent decline requiring assist for dressing/bathing), assist for IADLs, and family completes shopping and driving.  Pt presents today with decreased act gabrielle, generalized weakness, deconditioning, balance deficits in sitting/standing, UE ROM deficits, and decreased coordination.  Upon initial evaluation, pt appears to be performing below baseline functional status. Personal and env factors inhibiting engagement in occupations include advanced age, limited social support, inaccessible home environment, inaccessible bathroom environment, inaccessibility to community, difficulty completing ADLs, difficulty completing IADLs, and complicated medical history .  Pt reports her HHA is no longer available as she had to take a new job. Pt was able to complete supine <>sit with MaxA, elevated HOB and use of bed rails, poor sitting balance noted while seated EOB requiring external support from therapist. Pt req Min/ModA for gown management, MaxA for LB ADLs and [place] for grooming tasks. Due to pt's current functional limitations and medical complications pt is functioning below baseline. Patient would benefit from OT services within the acute care setting to maximize level of functional independence in the following areas self-care transfers, functional mobility, and ADLs. From OT standpoint, recommendation at time of D/C would be Level II.   Goals   Patient Goals to go to rehab   LTG Time Frame 10-14   Plan   Treatment Interventions ADL retraining;Functional transfer training;UE  strengthening/ROM;Endurance training;Cognitive reorientation;Patient/family training;Neuromuscular reeducation;Equipment evaluation/education;Fine motor coordination activities;Compensatory technique education;Continued evaluation;Energy conservation;Activityengagement   Goal Expiration Date 06/17/25   OT Treatment Day 0   OT Frequency 3-5x/wk   Discharge Recommendation   Rehab Resource Intensity Level, OT II (Moderate Resource Intensity)   AM-PAC Daily Activity Inpatient   Lower Body Dressing 2   Bathing 2   Toileting 2   Upper Body Dressing 3   Grooming 3   Eating 4   Daily Activity Raw Score 16   Daily Activity Standardized Score (Calc for Raw Score >=11) 35.96   AM-PAC Applied Cognition Inpatient   Following a Speech/Presentation 4   Understanding Ordinary Conversation 4   Taking Medications 4   Remembering Where Things Are Placed or Put Away 4   Remembering List of 4-5 Errands 3   Taking Care of Complicated Tasks 3   Applied Cognition Raw Score 22   Applied Cognition Standardized Score 47.83   End of Consult   Education Provided Yes;Family or social support of family present for education by provider   Patient Position at End of Consult Supine;Bed/Chair alarm activated;All needs within reach   Nurse Communication Nurse aware of consult     GOALS:   Goals established in order to promote pt's established goal of to go to rehab     -Patient will perform grooming tasks oral hygiene with overall Mod I in order to increase overall independence    -Patient will be Mod I with UB dressing using AE and AD as needed in order to increase (I) with ADLs    -Patient will be Mod I with UB bathing using AE and AD as needed in order to increase (I) with ADLs    -Patient will be Min A  with LB dressing with use of AE and AD as needed in order to increase (I) with ADLs    -Patient will be Min A  with LB bathing with use of AE and AD as needed in order to increase (I) with ADLs    -Patient will complete toileting w/ Mod I w/ G  hygiene/thoroughness in order to reduce caregiver burden    -Patient will demonstrate Mod I with bed mobility for ability to manage own comfort and initiate OOB tasks.     -Patient will perform functional transfers with Mod I to/from all surfaces using DME as needed in order to increase (I) with functional tasks    -Patient will tolerate therapeutic activities for greater than 30 min, in order to increase tolerance for functional activities.     -Patient will increase OOB/sitting tolerance to 2-4 hours per day to increase participation in self-care and leisure tasks with no s/s of exertion.       -Patient will demonstrate standing for 3+ min in order to increase active participation in functional activities    -Patient will be mod I with item retrieval in order to facilitate safe techniques/management at home upon d/c    -Patient will exhibit good safety awareness positioning of L UE during functional tasks.                  [1]   Past Medical History:  Diagnosis Date    DILLON (acute kidney injury) (HCC) 02/14/2025    Allergic 2022    Allergic to neomiacin and cephalexin    Arthritis     Cancer (HCC)     lung cancer    Cancer (HCC)     mets to brain    Cancer (HCC)     perianal mass    Cataract Nov. 2023    Due to have durgery June 2024    Disease of thyroid gland     Essential thrombocytosis (HCC)     controlled with medication    History of transfusion     Hyperlipidemia 2015    Hypertension 2011    Mass in chest     Nodular goiter     Osteoporosis     Peripheral neuropathy     Pneumonia Oct 16, 2023    Also  Feb 2024    Psoriasis     SIRS (systemic inflammatory response syndrome) (HCC) 06/22/2024    Stroke (HCC)    [2]   Past Surgical History:  Procedure Laterality Date    APPENDECTOMY  1954    BREAST CYST EXCISION Right     COLONOSCOPY  10/31/2018    DXA PROCEDURE (HISTORICAL)  04/21/2017    IR BIOPSY OTHER  09/12/2024    IR LUMBAR PUNCTURE  09/12/2024    MAMMO (HISTORICAL)      8-24-18    MAMMO NEEDLE  LOCALIZATION RIGHT (ALL INC) Right 07/13/2009    SKIN LESION EXCISION      bridge of nose

## 2025-06-03 NOTE — PLAN OF CARE
Problem: OCCUPATIONAL THERAPY ADULT  Goal: Performs self-care activities at highest level of function for planned discharge setting.  See evaluation for individualized goals.  Description: Treatment Interventions: ADL retraining, Functional transfer training, UE strengthening/ROM, Endurance training, Cognitive reorientation, Patient/family training, Neuromuscular reeducation, Equipment evaluation/education, Fine motor coordination activities, Compensatory technique education, Continued evaluation, Energy conservation, Activityengagement          See flowsheet documentation for full assessment, interventions and recommendations.   Note: Limitation: Decreased ADL status, Decreased UE strength, Decreased UE ROM, Decreased endurance, Decreased sensation, Decreased self-care trans, Decreased high-level ADLs, Decreased fine motor control, Non-func L UE  Prognosis: Fair  Assessment: Pt is a 74 y.o. female seen for OT evaluation s/p admission to [facility] on 5/31/2025 due to R sided flank pain, rad to abdomen and behind her leg in pt w/ stage IV CA of lung w/ brain mets, recent CVA. Diagnosed with Flank pain. Personal and env factors supporting pt at time of IE include supportive family, attitude towards recovery, and (A) with all ADLs.  PMH: stage IV adenocarcinoma of lung with metastasis to brain, CVA. Pt was living alone with HHA 5 days a week/7 hours a day in a [facility], [facility] [place] [facility] level for self help skills (recent decline requiring assist for dressing/bathing), assist for IADLs, and family completes shopping and driving.  Pt presents today with decreased act gabrielle, generalized weakness, deconditioning, balance deficits in sitting/standing, UE ROM deficits, and decreased coordination.  Upon initial evaluation, pt appears to be performing below baseline functional status. Personal and env factors inhibiting engagement in occupations include advanced age, limited social support, inaccessible home  environment, inaccessible bathroom environment, inaccessibility to community, difficulty completing ADLs, difficulty completing IADLs, and complicated medical history .  Pt reports her HHA is no longer available as she had to take a new job. Pt was able to complete supine <>sit with MaxA, elevated HOB and use of bed rails, poor sitting balance noted while seated EOB requiring external support from therapist. Pt req Min/ModA for gown management, MaxA for LB ADLs and [place] for grooming tasks. Due to pt's current functional limitations and medical complications pt is functioning below baseline. Patient would benefit from OT services within the acute care setting to maximize level of functional independence in the following areas self-care transfers, functional mobility, and ADLs. From OT standpoint, recommendation at time of D/C would be Level II.     Rehab Resource Intensity Level, OT: II (Moderate Resource Intensity)

## 2025-06-03 NOTE — ASSESSMENT & PLAN NOTE
Recent Labs     06/01/25  0557 06/02/25  0459 06/03/25  0502   HGB 10.1* 9.3* 8.9*     Anemia of chronic disease.  Hemoglobin baseline 9-10.  Continue to monitor.

## 2025-06-03 NOTE — SPEECH THERAPY NOTE
[facility] Speech Therapy Discharge Summary    Pt was discharged to subacute level of care on 5/30/2025 for continued skilled therapies. Throughout pt's stay on the unit, focus of ST services targeted cognitive linguistic skills as well as minimal word finding abilities to where pt was able to make steady progress overall. It was noted that upon time of discharge, pt was able to demonstrate consistency in cognitive linguistic skills to be consistent at the following levels: supervision for comprehension, expression, mod I for social interactions, min A for executive functions, memory.    Summarization of stay is outlined below:     Upon admission to the unit, pt did complete formalized cognitive assessment, the CLQT+ on initial evaluation with a Composite Severity Rating score of 3.4 out of 4.0, correlating to overall mildly impaired cognitive linguistic impairments at time of evaluation and in comparison to age matched peers ranging from 70-90 y/o. Cognitive barriers which present at this time include: decreased attention, visual attention, ST memory recall , problem solving, reasoning, sequencing, organization of thoughts, judgement, and slower processing. In order to address the noted barriers, skilled SLP services will address this by targeting the following interventions: verbal problem solving task, verbal working memory tasks, visual memory recall tasks, drawing conclusions activities, written sequencing tasks, verbal sequencing tasks, categorization tasks , written financial management tasks, verbal review of current medications, written review of medications, written calendar tasks, tangible scheduling tasks, functional reading tasks, and family education/training. Of note pt does report intermittent word finding deficits in which word deduction and word retrieval strategies are being reviewed.    Focus for continued services to target functional executive function skills, including problem solving, reasoning,  judgement, sequencing, but also targeting ST memory strategies, as well as I ADL review and initiation of word deduction/retrieval tasks.    Family training/education had been initiated/completed with pt's family (2 dtrs Juliana and Sue as well as ex- Sandro) about role of services and targeting tasks which are not only basic review but ensuring consistency in ability to complete I ADL's as pt reported. It is suspected that pt would likely require some level of supervision in regard to I ADL's at discharge to ensure thoroughness and accuracy given finances, scheduling and medications. Additionally family meeting was held to where SLP's recommendations were communicated to family for increased supervision and/or assistance given I ADL tasks at time of discharge. However, since family is not able to provide the increased supervision recommendations made by OT/PT, there will be a plan for transition to subacute level of care for family to increase support needs for pt to return home.    At this time, pt will continue to benefit from skilled cognitive linguistic tx sessions at subacute level of care to maximize overall functional independence given overall cognitive linguistic skills in attempts to decreased caregiver burden over time.

## 2025-06-03 NOTE — PLAN OF CARE
Problem: PAIN - ADULT  Goal: Verbalizes/displays adequate comfort level or baseline comfort level  Description: Interventions:  - Encourage patient to monitor pain and request assistance  - Assess pain using appropriate pain scale  - Administer analgesics as ordered based on type and severity of pain and evaluate response  - Implement non-pharmacological measures as appropriate and evaluate response  - Consider cultural and social influences on pain and pain management  - Notify physician/advanced practitioner if interventions unsuccessful or patient reports new pain  - Educate patient/family on pain management process including their role and importance of  reporting pain   - Provide non-pharmacologic/complimentary pain relief interventions  Outcome: Progressing     Problem: INFECTION - ADULT  Goal: Absence or prevention of progression during hospitalization  Description: INTERVENTIONS:  - Assess and monitor for signs and symptoms of infection  - Monitor lab/diagnostic results  - Monitor all insertion sites, i.e. indwelling lines, tubes, and drains  - Monitor endotracheal if appropriate and nasal secretions for changes in amount and color  - [place] appropriate cooling/warming therapies per order  - Administer medications as ordered  - Instruct and encourage patient and family to use good hand hygiene technique  - Identify and instruct in appropriate isolation precautions for identified infection/condition  Outcome: Progressing  Goal: Absence of fever/infection during neutropenic period  Description: INTERVENTIONS:  - Monitor WBC  - Perform strict hand hygiene  - Limit to healthy visitors only  - No plants, dried, fresh or silk flowers with patterson in patient room  Outcome: Progressing     Problem: SAFETY ADULT  Goal: Patient will remain free of falls  Description: INTERVENTIONS:  - Educate patient/family on patient safety including physical limitations  - Instruct patient to call for assistance with activity   -  Consider consulting OT/PT to assist with strengthening/mobility based on AM PAC & JH-HLM score  - Consult OT/PT to assist with strengthening/mobility   - Keep Call bell within reach  - Keep bed low and locked with side rails adjusted as appropriate  - Keep care items and personal belongings within reach  - Initiate and maintain comfort rounds  - Make Fall Risk Sign visible to staff  - Offer Toileting every  Hours, in advance of need  - Initiate/Maintainalarm  - Obtain necessary fall risk management equipment:   - Apply yellow socks and bracelet for high fall risk patients  - Consider moving patient to room near nurses station  Outcome: Progressing  Goal: Maintain or return to baseline ADL function  Description: INTERVENTIONS:  -  Assess patient's ability to carry out ADLs; assess patient's baseline for ADL function and identify physical deficits which impact ability to perform ADLs (bathing, care of mouth/teeth, toileting, grooming, dressing, etc.)  - Assess/evaluate cause of self-care deficits   - Assess range of motion  - Assess patient's mobility; develop plan if impaired  - Assess patient's need for assistive devices and provide as appropriate  - Encourage maximum independence but intervene and supervise when necessary  - Involve family in performance of ADLs  - Assess for home care needs following discharge   - Consider OT consult to assist with ADL evaluation and planning for discharge  - Provide patient education as appropriate  - Monitor functional capacity and physical performance, use of AM PAC & JH-HLM   - Monitor gait, balance and fatigue with ambulation    Outcome: Progressing  Goal: Maintains/Returns to pre admission functional level  Description: INTERVENTIONS:  - Perform AM-PAC 6 Click Basic Mobility/ Daily Activity assessment daily.  - Set and communicate daily mobility goal to care team and patient/family/caregiver.   - Collaborate with rehabilitation services on mobility goals if consulted  -  Perform Range of Motion  times a day.  - Reposition patient every  hours.  - Dangle patient  times a day  - Stand patient  times a day  - Ambulate patient  times a day  - Out of bed to chair  times a day   - Out of bed for meals  times a day  - Out of bed for toileting  - Record patient progress and toleration of activity level   Outcome: Progressing     Problem: DISCHARGE PLANNING  Goal: Discharge to home or other facility with appropriate resources  Description: INTERVENTIONS:  - Identify barriers to discharge w/patient and caregiver  - Arrange for needed discharge resources and transportation as appropriate  - Identify discharge learning needs (meds, wound care, etc.)  - Arrange for interpretive services to assist at discharge as needed  - Refer to Case Management Department for coordinating discharge planning if the patient needs post-hospital services based on physician/advanced practitioner order or complex needs related to functional status, cognitive ability, or social support system  Outcome: Progressing     Problem: Knowledge Deficit  Goal: Patient/family/caregiver demonstrates understanding of disease process, treatment plan, medications, and discharge instructions  Description: Complete learning assessment and assess knowledge base.  Interventions:  - Provide teaching at level of understanding  - Provide teaching via preferred learning methods  Outcome: Progressing     Problem: Prexisting or High Potential for Compromised Skin Integrity  Goal: Skin integrity is maintained or improved  Description: INTERVENTIONS:  - Identify patients at risk for skin breakdown  - Assess and monitor skin integrity including under and around medical devices   - Assess and monitor nutrition and hydration status  - Monitor labs  - Assess for incontinence   - Turn and reposition patient  - Assist with mobility/ambulation  - Relieve pressure over sandro prominences   - Avoid friction and shearing  - Provide appropriate hygiene  as needed including keeping skin clean and dry  - Evaluate need for skin moisturizer/barrier cream  - Collaborate with interdisciplinary team  - Patient/family teaching  - Consider wound care consult    Assess:  - Review Benjamín scale daily  - Clean and moisturize skin every   - Inspect skin when repositioning, toileting, and assisting with ADLS  - Assess under medical devices such as  every   - Assess extremities for adequate circulation and sensation     Bed Management:  - Have minimal linens on bed & keep smooth, unwrinkled  - Change linens as needed when moist or perspiring  - Avoid sitting or lying in one position for more than  hours while in bed?Keep HOB at degrees   - Toileting:  - Offer bedside commode  - Assess for incontinence every   - Use incontinent care products after each incontinent episode such as     Activity:  - Mobilize patient  times a day  - Encourage activity and walks on unit  - Encourage or provide ROM exercises   - Turn and reposition patient every  Hours  - Use appropriate equipment to lift or move patient in bed  - Instruct/ Assist with weight shifting every  when out of bed in chair  - Consider limitation of chair time  hour intervals    Skin Care:  - Avoid use of baby powder, tape, friction and shearing, hot water or constrictive clothing  - Relieve pressure over bony prominences using   - Do not massage red bony areas    Next Steps  - Teach patient strategies to minimize risks such as   - Consider consults to  interdisciplinary teams such a   Outcome: Progressing

## 2025-06-03 NOTE — PLAN OF CARE
Problem: PAIN - ADULT  Goal: Verbalizes/displays adequate comfort level or baseline comfort level  Description: Interventions:  - Encourage patient to monitor pain and request assistance  - Assess pain using appropriate pain scale  - Administer analgesics as ordered based on type and severity of pain and evaluate response  - Implement non-pharmacological measures as appropriate and evaluate response  - Consider cultural and social influences on pain and pain management  - Notify physician/advanced practitioner if interventions unsuccessful or patient reports new pain  - Educate patient/family on pain management process including their role and importance of  reporting pain   - Provide non-pharmacologic/complimentary pain relief interventions  6/3/2025 0935 by Kelly Blevins RN  Outcome: Progressing  6/3/2025 0935 by Kelly Blevins RN  Outcome: Progressing     Problem: INFECTION - ADULT  Goal: Absence or prevention of progression during hospitalization  Description: INTERVENTIONS:  - Assess and monitor for signs and symptoms of infection  - Monitor lab/diagnostic results  - Monitor all insertion sites, i.e. indwelling lines, tubes, and drains  - Monitor endotracheal if appropriate and nasal secretions for changes in amount and color  - [place] appropriate cooling/warming therapies per order  - Administer medications as ordered  - Instruct and encourage patient and family to use good hand hygiene technique  - Identify and instruct in appropriate isolation precautions for identified infection/condition  6/3/2025 0935 by Kelly Blevins RN  Outcome: Progressing  6/3/2025 0935 by Kelly Blevins RN  Outcome: Progressing  Goal: Absence of fever/infection during neutropenic period  Description: INTERVENTIONS:  - Monitor WBC  - Perform strict hand hygiene  - Limit to healthy visitors only  - No plants, dried, fresh or silk flowers with patterson in patient room  6/3/2025 0935 by Kelly Blevins RN  Outcome:  Progressing  6/3/2025 0935 by Kelly Blevins RN  Outcome: Progressing     Problem: SAFETY ADULT  Goal: Patient will remain free of falls  Description: INTERVENTIONS:  - Educate patient/family on patient safety including physical limitations  - Instruct patient to call for assistance with activity   - Consider consulting OT/PT to assist with strengthening/mobility based on AM PAC & JH-HLM score  - Consult OT/PT to assist with strengthening/mobility   - Keep Call bell within reach  - Keep bed low and locked with side rails adjusted as appropriate  - Keep care items and personal belongings within reach  - Initiate and maintain comfort rounds  - Make Fall Risk Sign visible to staff  - Offer Toileting every  Hours, in advance of need  - Initiate/Maintain alarm  - Obtain necessary fall risk management equipment:   - Apply yellow socks and bracelet for high fall risk patients  - Consider moving patient to room near nurses station  6/3/2025 0935 by Kelly Blevins RN  Outcome: Progressing  6/3/2025 0935 by Kelly Blevins RN  Outcome: Progressing  Goal: Maintain or return to baseline ADL function  Description: INTERVENTIONS:  -  Assess patient's ability to carry out ADLs; assess patient's baseline for ADL function and identify physical deficits which impact ability to perform ADLs (bathing, care of mouth/teeth, toileting, grooming, dressing, etc.)  - Assess/evaluate cause of self-care deficits   - Assess range of motion  - Assess patient's mobility; develop plan if impaired  - Assess patient's need for assistive devices and provide as appropriate  - Encourage maximum independence but intervene and supervise when necessary  - Involve family in performance of ADLs  - Assess for home care needs following discharge   - Consider OT consult to assist with ADL evaluation and planning for discharge  - Provide patient education as appropriate  - Monitor functional capacity and physical performance, use of AM PAC & JH-HLM    - Monitor gait, balance and fatigue with ambulation    6/3/2025 0935 by Kelly Blevins RN  Outcome: Progressing  6/3/2025 0935 by Kelly Blevins RN  Outcome: Progressing  Goal: Maintains/Returns to pre admission functional level  Description: INTERVENTIONS:  - Perform AM-PAC 6 Click Basic Mobility/ Daily Activity assessment daily.  - Set and communicate daily mobility goal to care team and patient/family/caregiver.   - Collaborate with rehabilitation services on mobility goals if consulted  - Perform Range of Motion  times a day.  - Reposition patient every  hours.  - Dangle patient  times a day  - Stand patient  times a day  - Ambulate patient  times a day  - Out of bed to chair times a day   - Out of bed for meals  times a day  - Out of bed for toileting  - Record patient progress and toleration of activity level   6/3/2025 0935 by Kelly Blevins RN  Outcome: Progressing  6/3/2025 0935 by Kelly Blevins RN  Outcome: Progressing     Problem: DISCHARGE PLANNING  Goal: Discharge to home or other facility with appropriate resources  Description: INTERVENTIONS:  - Identify barriers to discharge w/patient and caregiver  - Arrange for needed discharge resources and transportation as appropriate  - Identify discharge learning needs (meds, wound care, etc.)  - Arrange for interpretive services to assist at discharge as needed  - Refer to Case Management Department for coordinating discharge planning if the patient needs post-hospital services based on physician/advanced practitioner order or complex needs related to functional status, cognitive ability, or social support system  6/3/2025 0935 by Kelly Blevins RN  Outcome: Progressing  6/3/2025 0935 by Kelly Blevins RN  Outcome: Progressing     Problem: Knowledge Deficit  Goal: Patient/family/caregiver demonstrates understanding of disease process, treatment plan, medications, and discharge instructions  Description: Complete learning assessment  and assess knowledge base.  Interventions:  - Provide teaching at level of understanding  - Provide teaching via preferred learning methods  6/3/2025 0935 by Kelly Blevins RN  Outcome: Progressing  6/3/2025 0935 by Kelly Blevins RN  Outcome: Progressing     Problem: Prexisting or High Potential for Compromised Skin Integrity  Goal: Skin integrity is maintained or improved  Description: INTERVENTIONS:  - Identify patients at risk for skin breakdown  - Assess and monitor skin integrity including under and around medical devices   - Assess and monitor nutrition and hydration status  - Monitor labs  - Assess for incontinence   - Turn and reposition patient  - Assist with mobility/ambulation  - Relieve pressure over sandro prominences   - Avoid friction and shearing  - Provide appropriate hygiene as needed including keeping skin clean and dry  - Evaluate need for skin moisturizer/barrier cream  - Collaborate with interdisciplinary team  - Patient/family teaching  - Consider wound care consult    Assess:  - Review Benjamín scale daily  - Clean and moisturize skin every   - Inspect skin when repositioning, toileting, and assisting with ADLS  - Assess under medical devices such as  every   - Assess extremities for adequate circulation and sensation     Bed Management:  - Have minimal linens on bed & keep smooth, unwrinkled  - Change linens as needed when moist or perspiring  - Avoid sitting or lying in one position for more than  hours while in bed?Keep HOB at degrees   - Toileting:  - Offer bedside commode  - Assess for incontinence every   - Use incontinent care products after each incontinent episode such as     Activity:  - Mobilize patient times a day  - Encourage activity and walks on unit  - Encourage or provide ROM exercises   - Turn and reposition patient every  Hours  - Use appropriate equipment to lift or move patient in bed  - Instruct/ Assist with weight shifting every  when out of bed in chair  -  Consider limitation of chair time  hour intervals    Skin Care:  - Avoid use of baby powder, tape, friction and shearing, hot water or constrictive clothing  - Relieve pressure over bony prominences using   - Do not massage red bony areas    Next Steps:  - Teach patient strategies to minimize risks such as   - Consider consults to  interdisciplinary teams such  6/3/2025 0935 by Kelly Blevins RN  Outcome: Progressing  6/3/2025 0935 by Kelly Blevins RN  Outcome: Progressing
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
## 2025-06-03 NOTE — HOSPICE NOTE
Met with pt, her spouse and 2 daughters at bedside. Reviewed hospice care and services per Medicare guidelines. All questions answered. They are leaning toward STR with a transition to hospice. Updated IPCM of the same. They have my card and the hospice folder

## 2025-06-03 NOTE — ASSESSMENT & PLAN NOTE
Diagnosed in 07/2024, s/p radiation in 08/2024 and 01/2025. Treatments complicated by toxicities and infection.   Last treatment few weeks ago in April   MRI brain 5/13 showed stable enhancing lesions in the left occipital and parietal lobe with noted vasogenic edema.  Follows Dr. Castro for oncology, Dr Gamboa for Rad/onc, and follows palliative outpt   C/w decadron 3mg in the morning and 2mg in the evening   C/w Keppra 1000mg BID   C/w Bactrim -160mg MWF  Monitor mental status   Keppra levels supratherapeutic - no changes in regimen at this time

## 2025-06-03 NOTE — CASE MANAGEMENT
Case Management Assessment    Patient name Ayla Nye  Location W /W -01 MRN [id number]  : 1950 Date 6/3/2025       Current Admission Date: 2025  Current Admission Diagnosis:Flank pain   Patient Active Problem List    Diagnosis Date Noted    Flank pain 2025    Hematuria 2025    At risk for venous thromboembolism (VTE) 2025    Fall 05/15/2025    Impaired mobility and ADLs 05/15/2025    Vasogenic brain edema (HCC) 2025    Abnormal CT of the chest 2025    Stroke-like symptoms 2025    History of stroke 2025    NSVT (nonsustained ventricular tachycardia) (HCC) 2025    Left-sided weakness 2025    Right loin pain 2025    Complication of chemotherapy/immunotherapy 02/15/2025    GERD (gastroesophageal reflux disease) 2025    Epistaxis 2025    History of Pneumocystis jirovecii pneumonia 2025    IgG deficiency (HCC) 2025    Right kidney mass 2025    History of pulmonary embolism 2024    Dyspnea on exertion 2024    Imbalance 2024    Hyponatremia 2024    Leukocytosis 10/27/2024    Anemia 10/25/2024    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    Palliative care by specialist 2024    Cancer associated pain 2024    Encounter for screening involving social determinants of health (SDoH) 2024    Right hip pain 09/10/2024    Altered mental status 2024    Hypothyroidism 2024    Abnormal imaging of central nervous system 2024    Acute metabolic encephalopathy 2024    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) 2024    Brain mass 2024    Metastatic cancer to brain (HCC) 2024    Dehydration 2024    Moderate protein-calorie malnutrition (HCC) 2024    Bilateral leg pain 2024    Insomnia 2024    Metastatic adenocarcinoma (HCC) 2024    Age-related osteoporosis without  current pathological fracture 11/13/2023    Encounter for monitoring of hydroxyurea therapy 05/03/2023    JAK2 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 05/03/2023    HTN (hypertension) 08/10/2022    Mixed hyperlipidemia 08/10/2022    Essential thrombocytosis (HCC) 07/02/2020    TB lung, 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    Psoriatic arthritis (HCC) 09/10/2019      LOS (days): 2  Geometric Mean LOS (GMLOS) (days): 2.1  Days to GMLOS:0.1     OBJECTIVE:  PATIENT READMITTED TO HOSPITAL  Risk of Unplanned Readmission Score: 80.86         Current admission status: Inpatient       Preferred Pharmacy:   [id number] - [facility] [place] - [address]  [address]  [facility] [address]  Phone: [phone] Fax: [phone]    [facility] ([place]) - [place] - [address]  [address]  [place] [address]  Phone: [phone] Fax: [phone]    Primary Care Provider: Rafy Angelo MD    Primary Insurance: MEDICARE  Secondary Insurance: AARP    ASSESSMENT:  Active Health Care Proxies       Juliana Camarena Alternate Health Care Agent - Daughter   Primary Phone: [phone] (Mobile)  Home Phone: [phone]                 Readmission Root Cause  30 Day Readmission: Yes  During your hospital stay, did someone (provider, nurse, ) explain your care to you in a way you could understand?: Yes  Did you feel medically stable to leave the hospital?: Yes  Were you able to pay for your medication at the pharmacy?: Yes  Did you have reliable transportation to take you to your appointments?: Yes  During previous admission, was a post-acute recommendation made?: Yes  What post-acute resources were offered?: STR  Patient was readmitted due to: flank pain  Action Plan: pain control - change facilities    Patient Information  Admitted from:: Facility  Mental Status: Alert  During Assessment patient was accompanied by: Daughter  Assessment information provided by:: Daughter  Primary  Caregiver: Other (Comment)  Caregiver's Name:: [facility]  Caregiver's Relationship to Patient:: Facility Staff  Support Systems: Daughter, Home care staff, Family members  County of Residence: [place]  What city do you live in?: [place]  Home entry access options. Select all that apply.: No steps to enter home  Type of Current Residence: Facility  Upon entering residence, is there a bedroom on the main floor (no further steps)?: Yes  Upon entering residence, is there a bathroom on the main floor (no further steps)?: Yes    Activities of Daily Living Prior to Admission  Functional Status: Assistance  Completes ADLs independently?: No  Level of ADL dependence: Assistance  Ambulates independently?: No  Level of ambulatory dependence: Assistance  Does patient use assisted devices?: Yes  Assisted Devices (DME) used: Walker, Wheelchair  Does patient currently own DME?: Yes  What DME does the patient currently own?: Walker, Wheelchair  Does the patient have a history of Short-Term Rehab?: Yes ([facility], [facility])  Does patient have a history of HHC?: No  Does patient currently have HHC?: No     Patient Information Continued  Income Source: Pension/FCI  Does patient have prescription coverage?: Yes  Can the patient afford their medications and any related supplies (such as glucometers or test strips)?: Yes  Does patient receive dialysis treatments?: No  Does patient have a history of substance abuse?: No  Does patient have a history of Mental Health Diagnosis?: No     Means of Transportation  Means of Transport to Appts:: Family transport    CM reviewed the availability of treatment team to discuss questions or concerns patient and/or family may have regarding  understanding medications and recognizing signs and symptoms at discharge.  CM also encouraged patient to follow up with all recommended appointments after discharge. CM reviewed the information that will be provided to pt/family on the discharge  instructions.  Patient advised of importance for patient and family to participate in managing patient's medical well being.

## 2025-06-04 PROCEDURE — 99232 SBSQ HOSP IP/OBS MODERATE 35: CPT | Performed by: HOSPITALIST

## 2025-06-04 PROCEDURE — 99233 SBSQ HOSP IP/OBS HIGH 50: CPT

## 2025-06-04 PROCEDURE — 97110 THERAPEUTIC EXERCISES: CPT

## 2025-06-04 PROCEDURE — 97530 THERAPEUTIC ACTIVITIES: CPT

## 2025-06-04 RX ORDER — HYDROMORPHONE HYDROCHLORIDE 4 MG/1
2-4 TABLET ORAL EVERY 4 HOURS PRN
Qty: 30 TABLET | Refills: 0 | Status: SHIPPED | OUTPATIENT
Start: 2025-06-04 | End: 2025-06-14

## 2025-06-04 RX ORDER — HYDROMORPHONE HYDROCHLORIDE 4 MG/1
4 TABLET ORAL
Qty: 5 TABLET | Refills: 0 | Status: SHIPPED | OUTPATIENT
Start: 2025-06-04 | End: 2025-06-12 | Stop reason: SDUPTHER

## 2025-06-04 RX ADMIN — LEVOTHYROXINE SODIUM 50 MCG: 0.05 TABLET ORAL at 05:29

## 2025-06-04 RX ADMIN — GABAPENTIN 300 MG: 300 CAPSULE ORAL at 21:23

## 2025-06-04 RX ADMIN — SULFAMETHOXAZOLE AND TRIMETHOPRIM 1 TABLET: 800; 160 TABLET ORAL at 09:16

## 2025-06-04 RX ADMIN — LEVETIRACETAM 1000 MG: 500 TABLET, FILM COATED ORAL at 09:17

## 2025-06-04 RX ADMIN — GABAPENTIN 200 MG: 100 CAPSULE ORAL at 09:17

## 2025-06-04 RX ADMIN — QUETIAPINE 25 MG: 25 TABLET ORAL at 21:22

## 2025-06-04 RX ADMIN — AMLODIPINE BESYLATE 10 MG: 10 TABLET ORAL at 09:17

## 2025-06-04 RX ADMIN — ACETAMINOPHEN 975 MG: 325 TABLET ORAL at 13:35

## 2025-06-04 RX ADMIN — SENNOSIDES 17.2 MG: 8.6 TABLET, FILM COATED ORAL at 09:16

## 2025-06-04 RX ADMIN — HYDROMORPHONE HYDROCHLORIDE 4 MG: 4 TABLET ORAL at 21:23

## 2025-06-04 RX ADMIN — CYANOCOBALAMIN TAB 500 MCG 1000 MCG: 500 TAB at 09:17

## 2025-06-04 RX ADMIN — ACETAMINOPHEN 975 MG: 325 TABLET ORAL at 21:22

## 2025-06-04 RX ADMIN — ASPIRIN 81 MG: 81 TABLET, CHEWABLE ORAL at 09:17

## 2025-06-04 RX ADMIN — POLYETHYLENE GLYCOL 3350 17 G: 17 POWDER, FOR SOLUTION ORAL at 09:16

## 2025-06-04 RX ADMIN — PANTOPRAZOLE SODIUM 40 MG: 40 TABLET, DELAYED RELEASE ORAL at 05:29

## 2025-06-04 RX ADMIN — LEVETIRACETAM 1000 MG: 500 TABLET, FILM COATED ORAL at 20:50

## 2025-06-04 RX ADMIN — MIRTAZAPINE 7.5 MG: 15 TABLET, FILM COATED ORAL at 20:49

## 2025-06-04 RX ADMIN — HYDROMORPHONE HYDROCHLORIDE 2 MG: 2 TABLET ORAL at 09:17

## 2025-06-04 RX ADMIN — ATORVASTATIN CALCIUM 40 MG: 40 TABLET, FILM COATED ORAL at 17:19

## 2025-06-04 RX ADMIN — ENOXAPARIN SODIUM 40 MG: 40 INJECTION SUBCUTANEOUS at 09:16

## 2025-06-04 RX ADMIN — ACETAMINOPHEN 975 MG: 325 TABLET ORAL at 05:29

## 2025-06-04 RX ADMIN — DEXAMETHASONE 3 MG: 2 TABLET ORAL at 09:16

## 2025-06-04 RX ADMIN — DEXAMETHASONE 2 MG: 2 TABLET ORAL at 17:19

## 2025-06-04 NOTE — PROGRESS NOTES
Progress Note - Hospitalist   Name: Ayla Nye 74 y.o. female I MRN: [id number]  Unit/Bed#: W -01 I Date of Admission: 5/31/2025   Date of Service: 6/4/2025 I Hospital Day: 3    Assessment & Plan  Flank pain  Progressively worsening 9/10 dull ache right sided flank pain radiating to her abdomen and behind her leg that started yesterday.  Past three days her urine changed from clear to brown intermittently.     ED course-received 0.7 Dilaudid, Toradol 30 Mg, Zofran, 500 cc NS bolus    VS: on presentation hypertensive 204/101, last BP around for 165/88, satting 93% on 2 L (baseline RA)  Labs: WBC elevated at 16 but seems like this is her baseline in setting of steroid use, hemoglobin stable at 10.9, CMP pretty unremarkable, lipase and Pro-Du WNL  CT abd/pelv:  indicated progression of disease---diffuse metastatic disease, including enlargement of the right renal mass. There is possible invasion of the right renal vein. new mild right hydroureteronephrosis, with slightly increased density within the right ureterovesicular junction which may be blood vs focal lesion.  UA: trace leukocytes and large occult blood, 20-30 RBC, 4-10 WBC with occasional bacteria    Assessment: This is a 74-year-old female with currently 6/10 right-sided flank pain that is slowly becoming sharp in nature.  Unlikely bacterial etiology.  Symptoms likely due to disease progression.    Plan:  Monitor off antibiotics  Follow-up on urine culture  Supportive care   Pain regimen in place as this is likely due to progression of metastatic disease   Palliative care consult in place, plan for goals of care discussion   Patient and family have elected for rehab placement prior to initiation of hospice care. Discharge planning at this time - Medically stable for discharge  Encounter for screening involving social determinants of health (SDoH)  Pt presented to [facility] yesterday and pt/family state she was not tended to during  night (minimal/no pain meds, left on bed pan, unsafe bed situation etc) and are refusing to return to [facility].   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 Treatments complicated by toxicities and infection.   Last treatment few weeks ago in April   MRI brain 5/13 showed stable enhancing lesions in the left occipital and parietal lobe with noted vasogenic edema.  Follows Dr. Castro for oncology, Dr Gamboa for Rad/onc, and follows palliative outpt   C/w decadron 3mg in the morning and 2mg in the evening   C/w Keppra 1000mg BID   C/w Bactrim -160mg MWF  Monitor mental status   Keppra levels supratherapeutic - no changes in regimen at this time    HTN (hypertension)  Norvasc 10 Mg  IV Labetalol as needed for SBP > 180  Anemia  Recent Labs     06/02/25  0459 06/03/25  0502   HGB 9.3* 8.9*     Anemia of chronic disease.  Hemoglobin baseline 9-10.  Continue to monitor.  Cancer associated pain  Continue with home Gabapentin 200 in am and 300 at bedtime, patient is on Narcan, methocarbamol, hydromorphone, oxycodone, MiraLAX, Tylenol  Insomnia  C/w Seroquel 25 mg qHS  Seroquel 12.5 prn for additional support  C/w Remeron 7.5 at bedtime   Optimize sleep hygiene  History of Pneumocystis jirovecii pneumonia  Continue Bactrim -160mg MWF  Hypothyroidism  Continue with home levothyroxine 50 mcg daily  History of stroke  R frontal subacute infarct, noted on imaging on 04/21/25  C/w aspirin & statin.    VTE Pharmacologic Prophylaxis: VTE Score: 7 High Risk (Score >/= 5) - Pharmacological DVT Prophylaxis Ordered: enoxaparin (Lovenox). Sequential Compression Devices Ordered.    Mobility:   Basic Mobility Inpatient Raw Score: 10  JH-HLM Goal: 4: Move to chair/commode  JH-HLM Achieved: 4: Move to chair/commode  JH-HLM Goal achieved. Continue to encourage appropriate  mobility.    Patient Centered Rounds: I performed bedside rounds with nursing staff today.   Discussions with Specialists or Other Care Team Provider: Case management    Education and Discussions with Family / Patient: Attempted to update  (daughter) via phone. Left voicemail.     Current Length of Stay: 3 day(s)  Current Patient Status: Inpatient   Certification Statement: Stable for discharge pending placement  Discharge Plan: Stable for discharge pending placement    Code Status: Level 3 - DNAR and DNI    Subjective   Patient seen and examined at bedside, no acute events overnight.  She currently denies any pains or discomforts.  She has a good appetite and has no complaints at this time.    Objective :  Temp:  [97.4 °F (36.3 °C)-98.5 °F (36.9 °C)] 98.5 °F (36.9 °C)  HR:  [81-92] 91  BP: (119-141)/(74-86) 124/80  Resp:  [16] 16  SpO2:  [87 %-90 %] 89 %  O2 Device: None (Room air)    Body mass index is 21.77 kg/m².     Input and Output Summary (last 24 hours):     Intake/Output Summary (Last 24 hours) at 6/4/2025 1405  Last data filed at 6/4/2025 1250  Gross per 24 hour   Intake 960 ml   Output --   Net 960 ml       Physical Exam  Constitutional:       General: She is not in acute distress.     Appearance: She is ill-appearing.   HENT:      Mouth/Throat:      Mouth: Mucous membranes are moist.      Pharynx: Oropharynx is clear.     Cardiovascular:      Rate and Rhythm: Normal rate and regular rhythm.      Pulses: Normal pulses.      Heart sounds: Normal heart sounds. No murmur heard.  Pulmonary:      Effort: Pulmonary effort is normal. No respiratory distress.      Breath sounds: Normal breath sounds. No wheezing, rhonchi or rales.   Abdominal:      General: There is no distension.      Palpations: Abdomen is soft.      Tenderness: There is abdominal tenderness. There is no guarding or rebound.      Comments: Right-sided abdominal tenderness on palpation      Musculoskeletal:      Right lower leg: No  edema.     Skin:     General: Skin is warm and dry.     Neurological:      Mental Status: She is alert and oriented to person, place, and time. Mental status is at baseline.      Motor: Weakness present.      Comments: 0/5 strength upper and lower extremity with sensory deficit    Psychiatric:         Mood and Affect: Mood normal.         Behavior: Behavior normal.           Lines/Drains:              Lab Results: I have reviewed the following results:   Results from last 7 days   Lab Units 06/03/25  0502   WBC Thousand/uL 14.78*   HEMOGLOBIN g/dL 8.9*   HEMATOCRIT % 29.5*   PLATELETS Thousands/uL 256   BANDS PCT % 3   LYMPHO PCT % 2*   MONO PCT % 6   EOS PCT % 1     Results from last 7 days   Lab Units 06/02/25  0459 06/01/25  0557 05/31/25  1305   SODIUM mmol/L 137   < > 136   POTASSIUM mmol/L 5.0   < > 4.3   CHLORIDE mmol/L 104   < > 104   CO2 mmol/L 25   < > 23   BUN mg/dL 40*   < > 29*   CREATININE mg/dL 0.95   < > 0.84   ANION GAP mmol/L 8   < > 9   CALCIUM mg/dL 8.5   < > 9.4   ALBUMIN g/dL  --   --  3.5   TOTAL BILIRUBIN mg/dL  --   --  0.19*   ALK PHOS U/L  --   --  52   ALT U/L  --   --  12   AST U/L  --   --  9*   GLUCOSE RANDOM mg/dL 127   < > 123    < > = values in this interval not displayed.                 Results from last 7 days   Lab Units 05/31/25  1305   PROCALCITONIN ng/ml 0.16       Recent Cultures (last 7 days):               Last 24 Hours Medication List:     Current Facility-Administered Medications:     acetaminophen (TYLENOL) tablet 975 mg, Q8H [facility]    aluminum-magnesium hydroxide-simethicone (MAALOX) oral suspension 30 mL, Q4H PRN    amLODIPine (NORVASC) tablet 10 mg, Daily    aspirin chewable tablet 81 mg, Daily    atorvastatin (LIPITOR) tablet 40 mg, Daily With Dinner    bisacodyl (DULCOLAX) rectal suppository 10 mg, Daily PRN    cyanocobalamin (VITAMIN B-12) tablet 1,000 mcg, Daily    dexamethasone (DECADRON) tablet 2 mg, Daily **AND** dexamethasone (DECADRON) tablet 3 mg, Daily     enoxaparin (LOVENOX) subcutaneous injection 40 mg, Daily    gabapentin (NEURONTIN) capsule 200 mg, Daily **AND** gabapentin (NEURONTIN) capsule 300 mg, HS    HYDROmorphone (DILAUDID) tablet 2 mg, Q4H PRN    HYDROmorphone (DILAUDID) tablet 4 mg, Q4H PRN    HYDROmorphone (DILAUDID) tablet 4 mg, HS    HYDROmorphone HCl (DILAUDID) injection 0.2 mg, Q3H PRN    labetalol (NORMODYNE) injection 10 mg, Q6H PRN    levETIRAcetam (KEPPRA) tablet 1,000 mg, BID    levothyroxine tablet 50 mcg, Early Morning    lidocaine (LIDODERM) 5 % patch 1 patch, Daily    LORazepam (ATIVAN) injection 2 mg, Q8H PRN    methocarbamol (ROBAXIN) tablet 250 mg, Q6H PRN    mirtazapine (REMERON) tablet 7.5 mg, HS    naloxone (NARCAN) intranasal 2 mg, Daily PRN    pantoprazole (PROTONIX) EC tablet 40 mg, Daily Before Breakfast    polyethylene glycol (MIRALAX) packet 17 g, Daily    QUEtiapine (SEROquel) tablet 12.5 mg, Daily PRN    QUEtiapine (SEROquel) tablet 25 mg, HS    senna (SENOKOT) tablet 17.2 mg, Daily    sulfamethoxazole-trimethoprim (BACTRIM DS) 800-160 mg per tablet 1 tablet, Once per day on Monday Wednesday Friday    Administrative Statements   Today, Patient Was Seen By: Greg Isaac Jr,       **Please Note: This note may have been constructed using a voice recognition system.**

## 2025-06-04 NOTE — PLAN OF CARE
Problem: PAIN - ADULT  Goal: Verbalizes/displays adequate comfort level or baseline comfort level  Description: Interventions:  - Encourage patient to monitor pain and request assistance  - Assess pain using appropriate pain scale  - Administer analgesics as ordered based on type and severity of pain and evaluate response  - Implement non-pharmacological measures as appropriate and evaluate response  - Consider cultural and social influences on pain and pain management  - Notify physician/advanced practitioner if interventions unsuccessful or patient reports new pain  - Educate patient/family on pain management process including their role and importance of  reporting pain   - Provide non-pharmacologic/complimentary pain relief interventions  Outcome: Progressing     Problem: INFECTION - ADULT  Goal: Absence or prevention of progression during hospitalization  Description: INTERVENTIONS:  - Assess and monitor for signs and symptoms of infection  - Monitor lab/diagnostic results  - Monitor all insertion sites, i.e. indwelling lines, tubes, and drains  - Monitor endotracheal if appropriate and nasal secretions for changes in amount and color  - Thornton appropriate cooling/warming therapies per order  - Administer medications as ordered  - Instruct and encourage patient and family to use good hand hygiene technique  - Identify and instruct in appropriate isolation precautions for identified infection/condition  Outcome: Progressing  Goal: Absence of fever/infection during neutropenic period  Description: INTERVENTIONS:  - Monitor WBC  - Perform strict hand hygiene  - Limit to healthy visitors only  - No plants, dried, fresh or silk flowers with patterson in patient room  Outcome: Progressing     Problem: SAFETY ADULT  Goal: Patient will remain free of falls  Description: INTERVENTIONS:  - Educate patient/family on patient safety including physical limitations  - Instruct patient to call for assistance with activity   -  Consider consulting OT/PT to assist with strengthening/mobility based on AM PAC & JH-HLM score  - Consult OT/PT to assist with strengthening/mobility   - Keep Call bell within reach  - Keep bed low and locked with side rails adjusted as appropriate  - Keep care items and personal belongings within reach  - Initiate and maintain comfort rounds  - Make Fall Risk Sign visible to staff  - Offer Toileting every  Hours, in advance of need  - Initiate/Maintain alarm  - Obtain necessary fall risk management equipment:   - Apply yellow socks and bracelet for high fall risk patients  - Consider moving patient to room near nurses station  Outcome: Progressing  Goal: Maintain or return to baseline ADL function  Description: INTERVENTIONS:  -  Assess patient's ability to carry out ADLs; assess patient's baseline for ADL function and identify physical deficits which impact ability to perform ADLs (bathing, care of mouth/teeth, toileting, grooming, dressing, etc.)  - Assess/evaluate cause of self-care deficits   - Assess range of motion  - Assess patient's mobility; develop plan if impaired  - Assess patient's need for assistive devices and provide as appropriate  - Encourage maximum independence but intervene and supervise when necessary  - Involve family in performance of ADLs  - Assess for home care needs following discharge   - Consider OT consult to assist with ADL evaluation and planning for discharge  - Provide patient education as appropriate  - Monitor functional capacity and physical performance, use of AM PAC & JH-HLM   - Monitor gait, balance and fatigue with ambulation    Outcome: Progressing  Goal: Maintains/Returns to pre admission functional level  Description: INTERVENTIONS:  - Perform AM-PAC 6 Click Basic Mobility/ Daily Activity assessment daily.  - Set and communicate daily mobility goal to care team and patient/family/caregiver.   - Collaborate with rehabilitation services on mobility goals if consulted  -  Perform Range of Motion  times a day.  - Reposition patient every  hours.  - Dangle patient  times a day  - Stand patient  times a day  - Ambulate patient  times a day  - Out of bed to chair  times a day   - Out of bed for meals  times a day  - Out of bed for toileting  - Record patient progress and toleration of activity level   Outcome: Progressing     Problem: DISCHARGE PLANNING  Goal: Discharge to home or other facility with appropriate resources  Description: INTERVENTIONS:  - Identify barriers to discharge w/patient and caregiver  - Arrange for needed discharge resources and transportation as appropriate  - Identify discharge learning needs (meds, wound care, etc.)  - Arrange for interpretive services to assist at discharge as needed  - Refer to Case Management Department for coordinating discharge planning if the patient needs post-hospital services based on physician/advanced practitioner order or complex needs related to functional status, cognitive ability, or social support system  Outcome: Progressing     Problem: Knowledge Deficit  Goal: Patient/family/caregiver demonstrates understanding of disease process, treatment plan, medications, and discharge instructions  Description: Complete learning assessment and assess knowledge base.  Interventions:  - Provide teaching at level of understanding  - Provide teaching via preferred learning methods  Outcome: Progressing     Problem: Prexisting or High Potential for Compromised Skin Integrity  Goal: Skin integrity is maintained or improved  Description: INTERVENTIONS:  - Identify patients at risk for skin breakdown  - Assess and monitor skin integrity including under and around medical devices   - Assess and monitor nutrition and hydration status  - Monitor labs  - Assess for incontinence   - Turn and reposition patient  - Assist with mobility/ambulation  - Relieve pressure over sandro prominences   - Avoid friction and shearing  - Provide appropriate hygiene  as needed including keeping skin clean and dry  - Evaluate need for skin moisturizer/barrier cream  - Collaborate with interdisciplinary team  - Patient/family teaching  - Consider wound care consult    Assess:  - Review Benjamín scale daily  - Clean and moisturize skin every  - Inspect skin when repositioning, toileting, and assisting with ADLS  - Assess under medical devices such as  every   - Assess extremities for adequate circulation and sensation     Bed Management:  - Have minimal linens on bed & keep smooth, unwrinkled  - Change linens as needed when moist or perspiring  - Avoid sitting or lying in one position for more than  hours while in bed?Keep HOB at degrees   - Toileting:  - Offer bedside commode  - Assess for incontinence every   - Use incontinent care products after each incontinent episode such as     Activity:  - Mobilize patient  times a day  - Encourage activity and walks on unit  - Encourage or provide ROM exercises   - Turn and reposition patient every  Hours  - Use appropriate equipment to lift or move patient in bed  - Instruct/ Assist with weight shifting every  when out of bed in chair  - Consider limitation of chair time  hour intervals    Skin Care:  - Avoid use of baby powder, tape, friction and shearing, hot water or constrictive clothing  - Relieve pressure over bony prominences using   - Do not massage red bony areas    Next Steps:  - Teach patient strategies to minimize risks such as   - Consider consults to  interdisciplinary teams such as   Outcome: Progressing

## 2025-06-04 NOTE — PLAN OF CARE
Problem: PHYSICAL THERAPY ADULT  Goal: Performs mobility at highest level of function for planned discharge setting.  See evaluation for individualized goals.  Description: Treatment/Interventions: Functional transfer training, LE strengthening/ROM, Therapeutic exercise, Patient/family training, Equipment eval/education, Bed mobility, Gait training, Endurance training, Spoke to nursing  Equipment Recommended:  (WC and  unilateral vs B device)       See flowsheet documentation for full assessment, interventions and recommendations.  Outcome: Not Progressing  Note: Prognosis: Guarded  Problem List: Decreased strength, Decreased endurance, Impaired balance, Decreased mobility, Decreased coordination, Impaired vision  Assessment: pt began tx session seated on commode w/ RN and PCA present. PT to focus on transfer training, TE activities, standing tolerance and increasing pt activity tolerance. With quick move pt required max ax1 for STS as pt use RUE on cross bar to pull up to a standing position. pt was able to descend into the recliner from the quick move with mod Ax1. pt did participate in TE activities while seated in the recliner. pt was able to complete exercises w/ RLE w/ AROM but LLE required AAROM. pt required several seated therapeutic rest breaks in todays tx session due to fatigue, generalized weakness and c/o feeling extremely tired. pt cotninues to be funcitoning below her baseline level and remaisn at an increased risk for falls and injuries with all OOB activities at this time. pt would benefit from continued skilled PT interventions in order to address pt deficits listed above. Continue to recommend  DC w/ level 2 moderate resource intensity when medically cleared.  Barriers to Discharge: Inaccessible home environment, Decreased caregiver support  Barriers to Discharge Comments: lives alone and has HARRY  Rehab Resource Intensity Level, PT: II (Moderate Resource Intensity)    See flowsheet documentation  for full assessment.

## 2025-06-04 NOTE — PHYSICAL THERAPY NOTE
PHYSICAL THERAPY NOTE          Patient Name: Ayla Nye  Today's Date: 6/4/2025 06/04/25 0940   PT Last Visit   PT Visit Date 06/04/25   Note Type   Note Type Treatment   Pain Assessment   Pain Assessment Tool 0-10   Pain Score 4   Pain Location/Orientation Orientation: Lower;Location: Back   Pain Onset/Description Onset: Ongoing   Effect of Pain on Daily Activities limits activity tolerance, functional mobility, comfort   Patient's Stated Pain Goal No pain   Hospital Pain Intervention(s) Repositioned;Ambulation/increased activity;Elevated;Rest   Multiple Pain Sites No   Restrictions/Precautions   Weight Bearing Precautions Per Order No   Other Precautions Cognitive;Chair Alarm;Bed Alarm;Fall Risk;Visual impairment   General   Chart Reviewed Yes   Response to Previous Treatment Patient with no complaints from previous session.   Family/Caregiver Present No   Cognition   Overall Cognitive Status Impaired   Arousal/Participation Alert;Responsive;Cooperative   Attention Attends with cues to redirect   Orientation Level Oriented X4   Memory Within functional limits   Following Commands Follows one step commands without difficulty   Comments pt was cooperative in todays tx session   Subjective   Subjective pt reports 4/10 pain LBP, pt was agreeable to participate in PT tx session   Bed Mobility   Additional Comments Upon arrival to pt room pt seated on commode with PCA and RN present   Transfers   Sit to Stand 2  Maximal assistance   Additional items Assist x 1;Increased time required;Verbal cues   Stand to Sit 3  Moderate assistance   Additional items Assist x 1;Increased time required;Verbal cues   Stand pivot Unable to assess   Other 2  Maximal assistance   Additional items Assist x 1;Increased time required;Verbal cues;Other  (cross bar with R UE as QM was utilized for STS from commode and into [facility] recliner)    Ambulation/Elevation   Gait pattern (S)  Not tested   Balance   Static Sitting Poor +   Dynamic Sitting Poor +   Static Standing Poor -   Endurance Deficit   Endurance Deficit Yes   Endurance Deficit Description limited activity tolerance, standing tolerance, functional mobility, ambulation   Activity Tolerance   Activity Tolerance Patient limited by fatigue;Other (Comment)  (generalized weakness)   Exercises   Hip Abduction Sitting;10 reps;AROM;Right;AAROM;Left  (long sit position)   Hip Adduction Sitting;10 reps;AROM;Right;AAROM;Left  (long sit position)   Knee AROM Long Arc Quad Sitting;10 reps;AROM;Right;AAROM;Left   Ankle Pumps Sitting;10 reps;AROM;Right;AAROM;Left   Marching Sitting;10 reps;AROM;Right;AAROM;Left   Assessment   Prognosis Guarded   Problem List Decreased strength;Decreased endurance;Impaired balance;Decreased mobility;Decreased coordination;Impaired vision   Assessment pt began tx session seated on commode w/ RN and PCA present. PT to focus on transfer training, TE activities, standing tolerance and increasing pt activity tolerance. With quick move pt required max ax1 for STS as pt use RUE on cross bar to pull up to a standing position. pt was able to descend into the recliner from the quick move with mod Ax1. pt did participate in TE activities while seated in the recliner. pt was able to complete exercises w/ RLE w/ AROM but LLE required AAROM. pt required several seated therapeutic rest breaks in todays tx session due to fatigue, generalized weakness and c/o feeling extremely tired. pt cotninues to be funcitoning below her baseline level and remaisn at an increased risk for falls and injuries with all OOB activities at this time. pt would benefit from continued skilled PT interventions in order to address pt deficits listed above. Continue to recommend  DC w/ level 2 moderate resource intensity when medically cleared.   Barriers to Discharge Inaccessible home environment;Decreased caregiver  support   Barriers to Discharge Comments lives alone and has HARRY   Goals   Patient Goals none stated   STG Expiration Date 06/11/25   PT Treatment Day 2   Plan   Treatment/Interventions Functional transfer training;LE strengthening/ROM;Therapeutic exercise;Endurance training;Patient/family training;Equipment eval/education;Bed mobility;Spoke to nursing   Progress Slow progress, decreased activity tolerance   PT Frequency 3-5x/wk   Discharge Recommendation   Rehab Resource Intensity Level, PT II (Moderate Resource Intensity)   AM-PAC Basic Mobility Inpatient   Turning in Flat Bed Without Bedrails 2   Lying on Back to Sitting on Edge of Flat Bed Without Bedrails 2   Moving Bed to Chair 2   Standing Up From Chair Using Arms 2   Walk in Room 1   Climb 3-5 Stairs With Railing 1   Basic Mobility Inpatient Raw Score 10   [facility] Highest Level Of Mobility   -HL Goal 4: Move to chair/commode   -[facility] Achieved 4: Move to chair/commode   Education   Education Provided Other  (transfer training, TE activities)   Patient Reinforcement needed   End of Consult   Patient Position at End of Consult Bedside chair;Bed/Chair alarm activated;All needs within reach   The patient's AM-PAC Basic Mobility Inpatient Short Form Raw Score is 10. A Raw score of less than or equal to 16 suggests the patient may benefit from discharge to post-acute rehabilitation services. Please also refer to the recommendation of the Physical Therapist for safe discharge planning.    Chao Subramanian  [id number] AM    S. Pt requested to return to bed as pt was uncomfortable in the recliner    O. To safely transfer pt back to bed from the recliner and reposition pt towards HOB safely as pt was agitated due to pain.     A. Pt continues to require max ax1 for all functional transfers as HHA was utilized to get back to seated EOB from the recliner. Pt was agitated and just wanted to get out of the recliner. Pt was limited to 30 minutes of sitting in the recliner.  Pt required max ax1 for completing a sit<>supine transfers and max ax1 for repositioning towards HOB     P. PT will continue to work w/ patient during current hospitalization while following plan of care in order to achieve pt short term goals.   Chao Subramanian
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
## 2025-06-04 NOTE — CASE COMMUNICATION
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

## 2025-06-04 NOTE — CASE MANAGEMENT
Case Management Discharge Planning Note    Patient name Ayal Nye  Location W /W -01 MRN [id number]  : 1950 Date 2025       Current Admission Date: 2025  Current Admission Diagnosis:Flank pain   Patient Active Problem List    Diagnosis Date Noted    Flank pain 2025    Hematuria 2025    At risk for venous thromboembolism (VTE) 2025    Fall 05/15/2025    Impaired mobility and ADLs 05/15/2025    Vasogenic brain edema (HCC) 2025    Abnormal CT of the chest 2025    Stroke-like symptoms 2025    History of stroke 2025    NSVT (nonsustained ventricular tachycardia) (HCC) 2025    Left-sided weakness 2025    Right loin pain 2025    Complication of chemotherapy/immunotherapy 02/15/2025    GERD (gastroesophageal reflux disease) 2025    Epistaxis 2025    History of Pneumocystis jirovecii pneumonia 2025    IgG deficiency (HCC) 2025    Right kidney mass 2025    History of pulmonary embolism 2024    Dyspnea on exertion 2024    Imbalance 2024    Hyponatremia 2024    Leukocytosis 10/27/2024    Anemia 10/25/2024    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    Palliative care by specialist 2024    Cancer associated pain 2024    Encounter for screening involving social determinants of health (SDoH) 2024    Right hip pain 09/10/2024    Altered mental status 2024    Hypothyroidism 2024    Abnormal imaging of central nervous system 2024    Acute metabolic encephalopathy 2024    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) 2024    Brain mass 2024    Metastatic cancer to brain (HCC) 2024    Dehydration 2024    Moderate protein-calorie malnutrition (HCC) 2024    Bilateral leg pain 2024    Insomnia 2024    Metastatic adenocarcinoma (HCC) 2024    Age-related osteoporosis  without current pathological fracture 11/13/2023    Encounter for monitoring of hydroxyurea therapy 05/03/2023    JAK2 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 05/03/2023    HTN (hypertension) 08/10/2022    Mixed hyperlipidemia 08/10/2022    Essential thrombocytosis (HCC) 07/02/2020    TB lung, 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    Psoriatic arthritis (HCC) 09/10/2019      LOS (days): 3  Geometric Mean LOS (GMLOS) (days): 2.1  Days to GMLOS:-1     OBJECTIVE:  Risk of Unplanned Readmission Score: 81.55         Current admission status: Inpatient   Preferred Pharmacy:   [id number] - [facility] [place] - [address] [facility]  [address] [facility] [address]  Phone: [phone] Fax: [phone]    [facility] [place] - [address]  [address]  [facility] [address]  Phone: [phone] Fax: [phone]    Primary Care Provider: Rafy Angelo MD    Primary Insurance: MEDICARE  Secondary Insurance: [facility]    DISCHARGE DETAILS:     [facility] received pt's financial application and they stated they are not able to accept pt.  CM requested they contact the daughter to review this with her.      CM spoke with pt's daughter who was quite upset that [facility] is not able to accept pt and she would like to look into [facility].  CM explained they are requesting a financial mikala as well.  Daughter to email the financial mikala to CM to forward to [facility].    [facility] explained [facility] is also able to offer a bed and she stated she will go to tour that facility as well.  CM to follow up with pt's daughter Juliana tomorrow.

## 2025-06-04 NOTE — PLAN OF CARE
Problem: PAIN - ADULT  Goal: Verbalizes/displays adequate comfort level or baseline comfort level  Description: Interventions:  - Encourage patient to monitor pain and request assistance  - Assess pain using appropriate pain scale  - Administer analgesics as ordered based on type and severity of pain and evaluate response  - Implement non-pharmacological measures as appropriate and evaluate response  - Consider cultural and social influences on pain and pain management  - Notify physician/advanced practitioner if interventions unsuccessful or patient reports new pain  - Educate patient/family on pain management process including their role and importance of  reporting pain   - Provide non-pharmacologic/complimentary pain relief interventions  Outcome: Progressing     Problem: INFECTION - ADULT  Goal: Absence or prevention of progression during hospitalization  Description: INTERVENTIONS:  - Assess and monitor for signs and symptoms of infection  - Monitor lab/diagnostic results  - Monitor all insertion sites, i.e. indwelling lines, tubes, and drains  - Monitor endotracheal if appropriate and nasal secretions for changes in amount and color  - Wesley appropriate cooling/warming therapies per order  - Administer medications as ordered  - Instruct and encourage patient and family to use good hand hygiene technique  - Identify and instruct in appropriate isolation precautions for identified infection/condition  Outcome: Progressing  Goal: Absence of fever/infection during neutropenic period  Description: INTERVENTIONS:  - Monitor WBC  - Perform strict hand hygiene  - Limit to healthy visitors only  - No plants, dried, fresh or silk flowers with patterson in patient room  Outcome: Progressing     Problem: SAFETY ADULT  Goal: Patient will remain free of falls  Description: INTERVENTIONS:  - Educate patient/family on patient safety including physical limitations  - Instruct patient to call for assistance with activity   -  Consider consulting OT/PT to assist with strengthening/mobility based on AM PAC & JH-HLM score  - Consult OT/PT to assist with strengthening/mobility   - Keep Call bell within reach  - Keep bed low and locked with side rails adjusted as appropriate  - Keep care items and personal belongings within reach  - Initiate and maintain comfort rounds  - Make Fall Risk Sign visible to staff  - Offer Toileting every  Hours, in advance of need  - Initiate/Maintain alarm  - Obtain necessary fall risk management equipment:   - Apply yellow socks and bracelet for high fall risk patients  - Consider moving patient to room near nurses station  Outcome: Progressing  Goal: Maintain or return to baseline ADL function  Description: INTERVENTIONS:  -  Assess patient's ability to carry out ADLs; assess patient's baseline for ADL function and identify physical deficits which impact ability to perform ADLs (bathing, care of mouth/teeth, toileting, grooming, dressing, etc.)  - Assess/evaluate cause of self-care deficits   - Assess range of motion  - Assess patient's mobility; develop plan if impaired  - Assess patient's need for assistive devices and provide as appropriate  - Encourage maximum independence but intervene and supervise when necessary  - Involve family in performance of ADLs  - Assess for home care needs following discharge   - Consider OT consult to assist with ADL evaluation and planning for discharge  - Provide patient education as appropriate  - Monitor functional capacity and physical performance, use of AM PAC & JH-HLM   - Monitor gait, balance and fatigue with ambulation    Outcome: Progressing  Goal: Maintains/Returns to pre admission functional level  Description: INTERVENTIONS:  - Perform AM-PAC 6 Click Basic Mobility/ Daily Activity assessment daily.  - Set and communicate daily mobility goal to care team and patient/family/caregiver.   - Collaborate with rehabilitation services on mobility goals if consulted  -  Perform Range of Motion  times a day.  - Reposition patient every  hours.  - Dangle patient  times a day  - Stand patient  times a day  - Ambulate patient times a day  - Out of bed to chair  times a day   - Out of bed for meals  times a day  - Out of bed for toileting  - Record patient progress and toleration of activity level   Outcome: Progressing     Problem: DISCHARGE PLANNING  Goal: Discharge to home or other facility with appropriate resources  Description: INTERVENTIONS:  - Identify barriers to discharge w/patient and caregiver  - Arrange for needed discharge resources and transportation as appropriate  - Identify discharge learning needs (meds, wound care, etc.)  - Arrange for interpretive services to assist at discharge as needed  - Refer to Case Management Department for coordinating discharge planning if the patient needs post-hospital services based on physician/advanced practitioner order or complex needs related to functional status, cognitive ability, or social support system  Outcome: Progressing     Problem: Knowledge Deficit  Goal: Patient/family/caregiver demonstrates understanding of disease process, treatment plan, medications, and discharge instructions  Description: Complete learning assessment and assess knowledge base.  Interventions:  - Provide teaching at level of understanding  - Provide teaching via preferred learning methods  Outcome: Progressing     Problem: Prexisting or High Potential for Compromised Skin Integrity  Goal: Skin integrity is maintained or improved  Description: INTERVENTIONS:  - Identify patients at risk for skin breakdown  - Assess and monitor skin integrity including under and around medical devices   - Assess and monitor nutrition and hydration status  - Monitor labs  - Assess for incontinence   - Turn and reposition patient  - Assist with mobility/ambulation  - Relieve pressure over sandro prominences   - Avoid friction and shearing  - Provide appropriate hygiene as  needed including keeping skin clean and dry  - Evaluate need for skin moisturizer/barrier cream  - Collaborate with interdisciplinary team  - Patient/family teaching  - Consider wound care consult    Assess:  - Review Benjamín scale daily  - Clean and moisturize skin every   - Inspect skin when repositioning, toileting, and assisting with ADLS  - Assess under medical devices such as  every   - Assess extremities for adequate circulation and sensation     Bed Management:  - Have minimal linens on bed & keep smooth, unwrinkled  - Change linens as needed when moist or perspiring  - Avoid sitting or lying in one position for more than  hours while in bed?Keep HOB at degrees   - Toileting:  - Offer bedside commode  - Assess for incontinence every   - Use incontinent care products after each incontinent episode such as    Activity:  - Mobilize patient  times a day  - Encourage activity and walks on unit  - Encourage or provide ROM exercises   - Turn and reposition patient every  Hours  - Use appropriate equipment to lift or move patient in bed  - Instruct/ Assist with weight shifting every  when out of bed in chair  - Consider limitation of chair time hour intervals    Skin Care:  - Avoid use of baby powder, tape, friction and shearing, hot water or constrictive clothing  - Relieve pressure over bony prominences using  - Do not massage red bony areas    Next Steps:  - Teach patient strategies to minimize risks such as  - Consider consults to  interdisciplinary teams such as   Outcome: Progressing

## 2025-06-04 NOTE — ASSESSMENT & PLAN NOTE
Progressively worsening 9/10 dull ache right sided flank pain radiating to her abdomen and behind her leg that started yesterday.  Past three days her urine changed from clear to brown intermittently.     ED course-received 0.7 Dilaudid, Toradol 30 Mg, Zofran, 500 cc NS bolus    VS: on presentation hypertensive 204/101, last BP around for 165/88, satting 93% on 2 L (baseline RA)  Labs: WBC elevated at 16 but seems like this is her baseline in setting of steroid use, hemoglobin stable at 10.9, CMP pretty unremarkable, lipase and Pro-Du WNL  CT abd/pelv:  indicated progression of disease---diffuse metastatic disease, including enlargement of the right renal mass. There is possible invasion of the right renal vein. new mild right hydroureteronephrosis, with slightly increased density within the right ureterovesicular junction which may be blood vs focal lesion.  UA: trace leukocytes and large occult blood, 20-30 RBC, 4-10 WBC with occasional bacteria    Assessment: This is a 74-year-old female with currently 6/10 right-sided flank pain that is slowly becoming sharp in nature.  Unlikely bacterial etiology.  Symptoms likely due to disease progression.    Plan:  Monitor off antibiotics  Follow-up on urine culture  Supportive care   Pain regimen in place as this is likely due to progression of metastatic disease   Palliative care consult in place, plan for goals of care discussion   Patient and family have elected for rehab placement prior to initiation of hospice care. Discharge planning at this time - Medically stable for discharge

## 2025-06-04 NOTE — PROGRESS NOTES
Progress Note - Palliative Care   Name: Ayla Nye 74 y.o. female I MRN: [id number]  Unit/Bed#: W -01 I Date of Admission: 5/31/2025   Date of Service: 6/4/2025 I Hospital Day: 3    Assessment & Plan  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)  Follows with oncology  Cancer associated pain  Acetaminophen 1000 mg 3 times daily scheduled  Gabapentin 200 mg every morning and 300 mg at bedtime  Dilaudid 2 mg/4 mg p.o. every 4 hours as needed for moderate/severe pain  Dilaudid 4mg scheduled at bedtime. With hold parameters  Dilaudid 0.2 mg IV every 3 hours as needed breakthrough pain  Lidocaine patch  decadron 5 mg daily      ---->Dilaudid scripts printed and placed in binder on unit  Palliative care by specialist  Palliative Diagnosis: Stage IV adenocarcinoma of the lung    Goals:   Code Status: DNR- Level 3  Decisional apparatus:  Patient is competent on my exam today.  If competence is lost, patient's substitute decision maker would default to  adult daughters by [place] Act 169.  Advance Directive / Living Will / POLST: On file   Palliative will follow for ongoing goals of care discussions as situation evolves.    Social Support:  Patient's support system: family   Supportive listening provided  Normalized experience of patient    Care Coordination  Case discussed with CM    Follow-up  Plan to go to [facility] and transition to hospice  Please do not hesitate to contact our on-call provider through [facility] or contact [phone] should there be an acute change or other symptom control concerns.    Please do not make any changes to symptom medications (opioid analgesics, nonopioid analgesics, antiemetics, etc) without first consulting the on-call Palliative Care provider for your specific campus; including after hours and on weekends. We are available 24/7, and can be contacted on [employer]. chat or at [phone].        PDMP Review: I have reviewed the patient's controlled substance  dispensing history in the Prescription Drug Monitoring Program in compliance with the [facility] regulations before prescribing any controlled substances.    Subjective   24 Hour Chart History:  Chart Reviewed prior to visit    Patient resting in bed. No family present.   Pain controlled. Awaiting next dose of scheduled tylenol. Dilaudid has been helpful at bedtime.   Patient states that she and her family met with hospice yesterday. Plan is to go to [facility] and transition to hospice.     Scripts placed in binder on unit.      Objective :  Temp:  [97.4 °F (36.3 °C)-98.5 °F (36.9 °C)] 98.1 °F (36.7 °C)  HR:  [80-91] 80  BP: (119-130)/(74-80) 130/77  Resp:  [16] 16  SpO2:  [89 %-90 %] 90 %  O2 Device: None (Room air)    Physical Exam  Vitals and nursing note reviewed.   Constitutional:       General: She is not in acute distress.  HENT:      Head: Normocephalic and atraumatic.     Eyes:      General:         Right eye: No discharge.         Left eye: No discharge.       Cardiovascular:      Rate and Rhythm: Normal rate.   Pulmonary:      Effort: Pulmonary effort is normal. No respiratory distress.     Musculoskeletal:         General: Swelling present.      Cervical back: Normal range of motion.     Skin:     General: Skin is warm.     Neurological:      Mental Status: She is alert and oriented to person, place, and time.     Psychiatric:         Mood and Affect: Mood normal.         Behavior: Behavior normal.         Thought Content: Thought content normal.         Judgment: Judgment normal.            Lab Results: I have reviewed the following results:  Lab Results   Component Value Date/Time    SODIUM 137 06/02/2025 04:59 AM    K 5.0 06/02/2025 04:59 AM    BUN 40 (H) 06/02/2025 04:59 AM    CREATININE 0.95 06/02/2025 04:59 AM    GLUC 127 06/02/2025 04:59 AM    CALCIUM 8.5 06/02/2025 04:59 AM    AST 9 (L) 05/31/2025 01:05 PM    ALT 12 05/31/2025 01:05 PM    ALB 3.5 05/31/2025 01:05 PM    TP 6.5 05/31/2025 01:05 PM    EGFR 59  06/02/2025 04:59 AM     Lab Results   Component Value Date/Time    HGB 8.9 (L) 06/03/2025 05:02 AM    WBC 14.78 (H) 06/03/2025 05:02 AM     06/03/2025 05:02 AM    INR 0.94 05/05/2025 10:44 AM    PTT 25 05/05/2025 10:44 AM     Lab Results   Component Value Date/Time    AEB9HXZXZHGR 0.408 (L) 05/12/2025 02:23 PM       Code Status: Level 3 - DNAR and DNI  Advance Directive and Living Will:      Power of :    POLST:      Administrative Statements   I have spent a total time of 30 minutes in caring for this patient on the day of the visit/encounter including Risks and benefits of tx options, Instructions for management, Patient and family education, Importance of tx compliance, Risk factor reductions, Impressions, Counseling / Coordination of care, Documenting in the medical record, Reviewing/placing orders in the medical record (including tests, medications, and/or procedures), Obtaining or reviewing history  , and Communicating with other healthcare professionals .

## 2025-06-04 NOTE — ASSESSMENT & PLAN NOTE
Recent Labs     06/02/25  0459 06/03/25  0502   HGB 9.3* 8.9*     Anemia of chronic disease.  Hemoglobin baseline 9-10.  Continue to monitor.

## 2025-06-05 LAB
ANION GAP SERPL CALCULATED.3IONS-SCNC: 8 MMOL/L (ref 4–13)
BUN SERPL-MCNC: 31 MG/DL (ref 5–25)
CALCIUM SERPL-MCNC: 8.8 MG/DL (ref 8.4–10.2)
CHLORIDE SERPL-SCNC: 105 MMOL/L (ref 96–108)
CO2 SERPL-SCNC: 25 MMOL/L (ref 21–32)
CREAT SERPL-MCNC: 0.78 MG/DL (ref 0.6–1.3)
ERYTHROCYTE [DISTWIDTH] IN BLOOD BY AUTOMATED COUNT: 16.6 % (ref 11.6–15.1)
GFR SERPL CREATININE-BSD FRML MDRD: 75 ML/MIN/1.73SQ M
GLUCOSE SERPL-MCNC: 93 MG/DL (ref 65–140)
HCT VFR BLD AUTO: 30 % (ref 34.8–46.1)
HGB BLD-MCNC: 9.5 G/DL (ref 11.5–15.4)
MCH RBC QN AUTO: 28.4 PG (ref 26.8–34.3)
MCHC RBC AUTO-ENTMCNC: 31.7 G/DL (ref 31.4–37.4)
MCV RBC AUTO: 90 FL (ref 82–98)
PLATELET # BLD AUTO: 238 THOUSANDS/UL (ref 149–390)
PMV BLD AUTO: 9.2 FL (ref 8.9–12.7)
POTASSIUM SERPL-SCNC: 5.1 MMOL/L (ref 3.5–5.3)
RBC # BLD AUTO: 3.34 MILLION/UL (ref 3.81–5.12)
SODIUM SERPL-SCNC: 138 MMOL/L (ref 135–147)
WBC # BLD AUTO: 12.76 THOUSAND/UL (ref 4.31–10.16)

## 2025-06-05 PROCEDURE — 99232 SBSQ HOSP IP/OBS MODERATE 35: CPT | Performed by: HOSPITALIST

## 2025-06-05 PROCEDURE — 85027 COMPLETE CBC AUTOMATED: CPT

## 2025-06-05 PROCEDURE — 80048 BASIC METABOLIC PNL TOTAL CA: CPT

## 2025-06-05 RX ADMIN — LEVOTHYROXINE SODIUM 50 MCG: 0.05 TABLET ORAL at 05:50

## 2025-06-05 RX ADMIN — DEXAMETHASONE 2 MG: 2 TABLET ORAL at 15:34

## 2025-06-05 RX ADMIN — AMLODIPINE BESYLATE 10 MG: 10 TABLET ORAL at 09:37

## 2025-06-05 RX ADMIN — MIRTAZAPINE 7.5 MG: 15 TABLET, FILM COATED ORAL at 20:39

## 2025-06-05 RX ADMIN — ACETAMINOPHEN 975 MG: 325 TABLET ORAL at 05:50

## 2025-06-05 RX ADMIN — DEXAMETHASONE 3 MG: 2 TABLET ORAL at 09:37

## 2025-06-05 RX ADMIN — HYDROMORPHONE HYDROCHLORIDE 4 MG: 4 TABLET ORAL at 21:24

## 2025-06-05 RX ADMIN — ATORVASTATIN CALCIUM 40 MG: 40 TABLET, FILM COATED ORAL at 17:15

## 2025-06-05 RX ADMIN — QUETIAPINE 25 MG: 25 TABLET ORAL at 21:24

## 2025-06-05 RX ADMIN — ENOXAPARIN SODIUM 40 MG: 40 INJECTION SUBCUTANEOUS at 09:38

## 2025-06-05 RX ADMIN — HYDROMORPHONE HYDROCHLORIDE 2 MG: 2 TABLET ORAL at 09:36

## 2025-06-05 RX ADMIN — GABAPENTIN 300 MG: 300 CAPSULE ORAL at 21:24

## 2025-06-05 RX ADMIN — ASPIRIN 81 MG: 81 TABLET, CHEWABLE ORAL at 09:37

## 2025-06-05 RX ADMIN — SENNOSIDES 17.2 MG: 8.6 TABLET, FILM COATED ORAL at 09:37

## 2025-06-05 RX ADMIN — ACETAMINOPHEN 975 MG: 325 TABLET ORAL at 15:34

## 2025-06-05 RX ADMIN — ACETAMINOPHEN 975 MG: 325 TABLET ORAL at 21:24

## 2025-06-05 RX ADMIN — LEVETIRACETAM 1000 MG: 500 TABLET, FILM COATED ORAL at 20:38

## 2025-06-05 RX ADMIN — GABAPENTIN 200 MG: 100 CAPSULE ORAL at 09:37

## 2025-06-05 RX ADMIN — PANTOPRAZOLE SODIUM 40 MG: 40 TABLET, DELAYED RELEASE ORAL at 05:50

## 2025-06-05 RX ADMIN — LEVETIRACETAM 1000 MG: 500 TABLET, FILM COATED ORAL at 09:37

## 2025-06-05 RX ADMIN — BISACODYL 10 MG: 10 SUPPOSITORY RECTAL at 10:52

## 2025-06-05 RX ADMIN — CYANOCOBALAMIN TAB 500 MCG 1000 MCG: 500 TAB at 09:37
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
## 2025-06-05 NOTE — PLAN OF CARE
Problem: PAIN - ADULT  Goal: Verbalizes/displays adequate comfort level or baseline comfort level  Description: Interventions:  - Encourage patient to monitor pain and request assistance  - Assess pain using appropriate pain scale  - Administer analgesics as ordered based on type and severity of pain and evaluate response  - Implement non-pharmacological measures as appropriate and evaluate response  - Consider cultural and social influences on pain and pain management  - Notify physician/advanced practitioner if interventions unsuccessful or patient reports new pain  - Educate patient/family on pain management process including their role and importance of  reporting pain   - Provide non-pharmacologic/complimentary pain relief interventions  Outcome: Progressing     Problem: INFECTION - ADULT  Goal: Absence or prevention of progression during hospitalization  Description: INTERVENTIONS:  - Assess and monitor for signs and symptoms of infection  - Monitor lab/diagnostic results  - Monitor all insertion sites, i.e. indwelling lines, tubes, and drains  - Monitor endotracheal if appropriate and nasal secretions for changes in amount and color  - [place] appropriate cooling/warming therapies per order  - Administer medications as ordered  - Instruct and encourage patient and family to use good hand hygiene technique  - Identify and instruct in appropriate isolation precautions for identified infection/condition  Outcome: Progressing  Goal: Absence of fever/infection during neutropenic period  Description: INTERVENTIONS:  - Monitor WBC  - Perform strict hand hygiene  - Limit to healthy visitors only  - No plants, dried, fresh or silk flowers with patterson in patient room  Outcome: Progressing     Problem: SAFETY ADULT  Goal: Patient will remain free of falls  Description: INTERVENTIONS:  - Educate patient/family on patient safety including physical limitations  - Instruct patient to call for assistance with activity   -  Consider consulting OT/PT to assist with strengthening/mobility based on AM PAC & JH-HLM score  - Consult OT/PT to assist with strengthening/mobility   - Keep Call bell within reach  - Keep bed low and locked with side rails adjusted as appropriate  - Keep care items and personal belongings within reach  - Initiate and maintain comfort rounds  - Make Fall Risk Sign visible to staff  - Offer Toileting every  Hours, in advance of need  - Initiate/Maintain alarm  - Obtain necessary fall risk management equipment:   - Apply yellow socks and bracelet for high fall risk patients  - Consider moving patient to room near nurses station  Outcome: Progressing  Goal: Maintain or return to baseline ADL function  Description: INTERVENTIONS:  -  Assess patient's ability to carry out ADLs; assess patient's baseline for ADL function and identify physical deficits which impact ability to perform ADLs (bathing, care of mouth/teeth, toileting, grooming, dressing, etc.)  - Assess/evaluate cause of self-care deficits   - Assess range of motion  - Assess patient's mobility; develop plan if impaired  - Assess patient's need for assistive devices and provide as appropriate  - Encourage maximum independence but intervene and supervise when necessary  - Involve family in performance of ADLs  - Assess for home care needs following discharge   - Consider OT consult to assist with ADL evaluation and planning for discharge  - Provide patient education as appropriate  - Monitor functional capacity and physical performance, use of AM PAC & JH-HLM   - Monitor gait, balance and fatigue with ambulation    Outcome: Progressing  Goal: Maintains/Returns to pre admission functional level  Description: INTERVENTIONS:  - Perform AM-PAC 6 Click Basic Mobility/ Daily Activity assessment daily.  - Set and communicate daily mobility goal to care team and patient/family/caregiver.   - Collaborate with rehabilitation services on mobility goals if consulted  -  Perform Range of Motion  times a day.  - Reposition patient every  hours.  - Dangle patient  times a day  - Stand patient  times a day  - Ambulate patient  times a day  - Out of bed to chair  times a day   - Out of bed for meals  times a day  - Out of bed for toileting  - Record patient progress and toleration of activity level   Outcome: Progressing     Problem: DISCHARGE PLANNING  Goal: Discharge to home or other facility with appropriate resources  Description: INTERVENTIONS:  - Identify barriers to discharge w/patient and caregiver  - Arrange for needed discharge resources and transportation as appropriate  - Identify discharge learning needs (meds, wound care, etc.)  - Arrange for interpretive services to assist at discharge as needed  - Refer to Case Management Department for coordinating discharge planning if the patient needs post-hospital services based on physician/advanced practitioner order or complex needs related to functional status, cognitive ability, or social support system  Outcome: Progressing     Problem: Knowledge Deficit  Goal: Patient/family/caregiver demonstrates understanding of disease process, treatment plan, medications, and discharge instructions  Description: Complete learning assessment and assess knowledge base.  Interventions:  - Provide teaching at level of understanding  - Provide teaching via preferred learning methods  Outcome: Progressing     Problem: Prexisting or High Potential for Compromised Skin Integrity  Goal: Skin integrity is maintained or improved  Description: INTERVENTIONS:  - Identify patients at risk for skin breakdown  - Assess and monitor skin integrity including under and around medical devices   - Assess and monitor nutrition and hydration status  - Monitor labs  - Assess for incontinence   - Turn and reposition patient  - Assist with mobility/ambulation  - Relieve pressure over sandro prominences   - Avoid friction and shearing  - Provide appropriate hygiene  as needed including keeping skin clean and dry  - Evaluate need for skin moisturizer/barrier cream  - Collaborate with interdisciplinary team  - Patient/family teaching  - Consider wound care consult    Assess:  - Review Benjamín scale daily  - Clean and moisturize skin every   - Inspect skin when repositioning, toileting, and assisting with ADLS  - Assess under medical devices such as  every   - Assess extremities for adequate circulation and sensation     Bed Management:  - Have minimal linens on bed & keep smooth, unwrinkled  - Change linens as needed when moist or perspiring  - Avoid sitting or lying in one position for more than  hours while in bed?Keep HOB at degrees   - Toileting:  - Offer bedside commode  - Assess for incontinence every   - Use incontinent care products after each incontinent episode such as     Activity:  - Mobilize patient  times a day  - Encourage activity and walks on unit  - Encourage or provide ROM exercises   - Turn and reposition patient every  Hours  - Use appropriate equipment to lift or move patient in bed  - Instruct/ Assist with weight shifting every  when out of bed in chair  - Consider limitation of chair time  hour intervals    Skin Care:  - Avoid use of baby powder, tape, friction and shearing, hot water or constrictive clothing  - Relieve pressure over bony prominences using   - Do not massage red bony areas    Next Steps:  - Teach patient strategies to minimize risks such as   - Consider consults to  interdisciplinary teams such as   Outcome: Progressing

## 2025-06-05 NOTE — PLAN OF CARE
Problem: PAIN - ADULT  Goal: Verbalizes/displays adequate comfort level or baseline comfort level  Description: Interventions:  - Encourage patient to monitor pain and request assistance  - Assess pain using appropriate pain scale  - Administer analgesics as ordered based on type and severity of pain and evaluate response  - Implement non-pharmacological measures as appropriate and evaluate response  - Consider cultural and social influences on pain and pain management  - Notify physician/advanced practitioner if interventions unsuccessful or patient reports new pain  - Educate patient/family on pain management process including their role and importance of  reporting pain   - Provide non-pharmacologic/complimentary pain relief interventions  Outcome: Progressing     Problem: INFECTION - ADULT  Goal: Absence or prevention of progression during hospitalization  Description: INTERVENTIONS:  - Assess and monitor for signs and symptoms of infection  - Monitor lab/diagnostic results  - Monitor all insertion sites, i.e. indwelling lines, tubes, and drains  - Monitor endotracheal if appropriate and nasal secretions for changes in amount and color  - [place] appropriate cooling/warming therapies per order  - Administer medications as ordered  - Instruct and encourage patient and family to use good hand hygiene technique  - Identify and instruct in appropriate isolation precautions for identified infection/condition  Outcome: Progressing  Goal: Absence of fever/infection during neutropenic period  Description: INTERVENTIONS:  - Monitor WBC  - Perform strict hand hygiene  - Limit to healthy visitors only  - No plants, dried, fresh or silk flowers with patterson in patient room  Outcome: Progressing     Problem: SAFETY ADULT  Goal: Patient will remain free of falls  Description: INTERVENTIONS:  - Educate patient/family on patient safety including physical limitations  - Instruct patient to call for assistance with activity   -  Consider consulting OT/PT to assist with strengthening/mobility based on AM PAC & JH-HLM score  - Consult OT/PT to assist with strengthening/mobility   - Keep Call bell within reach  - Keep bed low and locked with side rails adjusted as appropriate  - Keep care items and personal belongings within reach  - Initiate and maintain comfort rounds  - Make Fall Risk Sign visible to staff  - Offer Toileting every  Hours, in advance of need  - Initiate/Maintain alarm  - Obtain necessary fall risk management equipment:   - Apply yellow socks and bracelet for high fall risk patients  - Consider moving patient to room near nurses station  Outcome: Progressing  Goal: Maintain or return to baseline ADL function  Description: INTERVENTIONS:  -  Assess patient's ability to carry out ADLs; assess patient's baseline for ADL function and identify physical deficits which impact ability to perform ADLs (bathing, care of mouth/teeth, toileting, grooming, dressing, etc.)  - Assess/evaluate cause of self-care deficits   - Assess range of motion  - Assess patient's mobility; develop plan if impaired  - Assess patient's need for assistive devices and provide as appropriate  - Encourage maximum independence but intervene and supervise when necessary  - Involve family in performance of ADLs  - Assess for home care needs following discharge   - Consider OT consult to assist with ADL evaluation and planning for discharge  - Provide patient education as appropriate  - Monitor functional capacity and physical performance, use of AM PAC & JH-HLM   - Monitor gait, balance and fatigue with ambulation    Outcome: Progressing  Goal: Maintains/Returns to pre admission functional level  Description: INTERVENTIONS:  - Perform AM-PAC 6 Click Basic Mobility/ Daily Activity assessment daily.  - Set and communicate daily mobility goal to care team and patient/family/caregiver.   - Collaborate with rehabilitation services on mobility goals if consulted  -  Perform Range of Motion  times a day.  - Reposition patient every  hours.  - Dangle patient  times a day  - Stand patient  times a day  - Ambulate patient  times a day  - Out of bed to chair  times a day   - Out of bed for meals  times a day  - Out of bed for toileting  - Record patient progress and toleration of activity level   Outcome: Progressing     Problem: DISCHARGE PLANNING  Goal: Discharge to home or other facility with appropriate resources  Description: INTERVENTIONS:  - Identify barriers to discharge w/patient and caregiver  - Arrange for needed discharge resources and transportation as appropriate  - Identify discharge learning needs (meds, wound care, etc.)  - Arrange for interpretive services to assist at discharge as needed  - Refer to Case Management Department for coordinating discharge planning if the patient needs post-hospital services based on physician/advanced practitioner order or complex needs related to functional status, cognitive ability, or social support system  Outcome: Progressing     Problem: Knowledge Deficit  Goal: Patient/family/caregiver demonstrates understanding of disease process, treatment plan, medications, and discharge instructions  Description: Complete learning assessment and assess knowledge base.  Interventions:  - Provide teaching at level of understanding  - Provide teaching via preferred learning methods  Outcome: Progressing     Problem: Prexisting or High Potential for Compromised Skin Integrity  Goal: Skin integrity is maintained or improved  Description: INTERVENTIONS:  - Identify patients at risk for skin breakdown  - Assess and monitor skin integrity including under and around medical devices   - Assess and monitor nutrition and hydration status  - Monitor labs  - Assess for incontinence   - Turn and reposition patient  - Assist with mobility/ambulation  - Relieve pressure over sandro prominences   - Avoid friction and shearing  - Provide appropriate hygiene  as needed including keeping skin clean and dry  - Evaluate need for skin moisturizer/barrier cream  - Collaborate with interdisciplinary team  - Patient/family teaching  - Consider wound care consult    Assess:  - Review Benjamín scale daily  - Clean and moisturize skin every   - Inspect skin when repositioning, toileting, and assisting with ADLS  - Assess under medical devices such as very   - Assess extremities for adequate circulation and sensation     Bed Management:  - Have minimal linens on bed & keep smooth, unwrinkled  - Change linens as needed when moist or perspiring  - Avoid sitting or lying in one position for more than  hours while in bed?Keep HOB at degrees   - Toileting:  - Offer bedside commode  - Assess for incontinence every   - Use incontinent care products after each incontinent episode such as     Activity:  - Mobilize patient  times a day  - Encourage activity and walks on unit  - Encourage or provide ROM exercises   - Turn and reposition patient every  Hours  - Use appropriate equipment to lift or move patient in bed  - Instruct/ Assist with weight shifting every  when out of bed in chair  - Consider limitation of chair time  hour intervals    Skin Care:  - Avoid use of baby powder, tape, friction and shearing, hot water or constrictive clothing  - Relieve pressure over bony prominences using   - Do not massage red bony areas    Next Steps:  - Teach patient strategies to minimize risks such as   - Consider consults to  interdisciplinary teams such as Outcome: Progressing

## 2025-06-05 NOTE — CASE MANAGEMENT
Case Management Discharge Planning Note    Patient name Ayla Nye  Location W /W -01 MRN [id number]  : 1950 Date 2025       Current Admission Date: 2025  Current Admission Diagnosis:Flank pain   Patient Active Problem List    Diagnosis Date Noted    Flank pain 2025    Hematuria 2025    At risk for venous thromboembolism (VTE) 2025    Fall 05/15/2025    Impaired mobility and ADLs 05/15/2025    Vasogenic brain edema (HCC) 2025    Abnormal CT of the chest 2025    Stroke-like symptoms 2025    History of stroke 2025    NSVT (nonsustained ventricular tachycardia) (HCC) 2025    Left-sided weakness 2025    Right loin pain 2025    Complication of chemotherapy/immunotherapy 02/15/2025    GERD (gastroesophageal reflux disease) 2025    Epistaxis 2025    History of Pneumocystis jirovecii pneumonia 2025    IgG deficiency (HCC) 2025    Right kidney mass 2025    History of pulmonary embolism 2024    Dyspnea on exertion 2024    Imbalance 2024    Hyponatremia 2024    Leukocytosis 10/27/2024    Anemia 10/25/2024    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    Palliative care by specialist 2024    Cancer associated pain 2024    Encounter for screening involving social determinants of health (SDoH) 2024    Right hip pain 09/10/2024    Altered mental status 2024    Hypothyroidism 2024    Abnormal imaging of central nervous system 2024    Acute metabolic encephalopathy 2024    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) 2024    Brain mass 2024    Metastatic cancer to brain (HCC) 2024    Dehydration 2024    Moderate protein-calorie malnutrition (HCC) 2024    Bilateral leg pain 2024    Insomnia 2024    Metastatic adenocarcinoma (HCC) 2024    Age-related osteoporosis  without current pathological fracture 11/13/2023    Encounter for monitoring of hydroxyurea therapy 05/03/2023    JAK2 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 05/03/2023    HTN (hypertension) 08/10/2022    Mixed hyperlipidemia 08/10/2022    Essential thrombocytosis (HCC) 07/02/2020    TB lung, 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    Psoriatic arthritis (HCC) 09/10/2019      LOS (days): 4  Geometric Mean LOS (GMLOS) (days): 2.1  Days to GMLOS:-1.9     OBJECTIVE:  Risk of Unplanned Readmission Score: 81.77         Current admission status: Inpatient   Preferred Pharmacy:   [id number] - [facility] [place] - [address]  [address] [facility] [address]  Phone: [phone] Fax: [phone]    [facility] - [place] - [address]  [address] [place] [address]  Phone: [phone] Fax: [phone]    Primary Care Provider: Rafy Angelo MD    Primary Insurance: MEDICARE  Secondary Insurance: AARP    DISCHARGE DETAILS:     CM attempted to contact pt's daughter to review the facility she would like her mother to go to for rehab.  [facility] has accepted pt.  [facility] is still reviewing the financial application.  CM requested a return call.  CM will continue to follow to assist with needs and planning for DC.

## 2025-06-05 NOTE — ASSESSMENT & PLAN NOTE
Recent Labs     06/03/25  0502 06/05/25  0448   HGB 8.9* 9.5*     Anemia of chronic disease.  Hemoglobin baseline 9-10.  Continue to monitor.

## 2025-06-05 NOTE — PHYSICAL THERAPY NOTE
ARC PT DC SUMMARY    Pt is a 74 y.o. female admitted to rehab with impairment of mobility/safety/functional mobility s/p R MCA 2 weeks ago and admission to [facility] on 5/12 due to worsening L sided weakness and fall. During the admission, MRI showed slightly worsening R MCA infarct as well as progressive brain metastasis. 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 PTA she reports that she was independent in ADLs, transfers, and gait with HHA 5 hours a day/6 days a week to assist with stair negotiation and companionship primarily. She does not drive, and relies on her ex- for transport to and from the grocery store. She lives in a 2 story home with 6 steps to enter with HR on R side, as well as 12 stairs inside the house with HR on R side. PTA she admits to going down the stairs on her butt due to fear of falling.    Pt with slowed progress t/o acute rehab stay, with progressive decline in function and activity tolerance, limited by pain, ftg, and CAIN.  Family meeting occurred and all in agreement for SNF level care as pt lives alone and will not have A for DC home. DC SNF 5/30/25.

## 2025-06-05 NOTE — PROGRESS NOTES
Progress Note - Hospitalist   Name: Ayla Nye 74 y.o. female I MRN: [id number]  Unit/Bed#: W -01 I Date of Admission: 5/31/2025   Date of Service: 6/5/2025 I Hospital Day: 4    Assessment & Plan  Flank pain  Progressively worsening 9/10 dull ache right sided flank pain radiating to her abdomen and behind her leg that started yesterday.  Past three days her urine changed from clear to brown intermittently.     ED course-received 0.7 Dilaudid, Toradol 30 Mg, Zofran, 500 cc NS bolus    VS: on presentation hypertensive 204/101, last BP around for 165/88, satting 93% on 2 L (baseline RA)  Labs: WBC elevated at 16 but seems like this is her baseline in setting of steroid use, hemoglobin stable at 10.9, CMP pretty unremarkable, lipase and Pro-Du WNL  CT abd/pelv:  indicated progression of disease---diffuse metastatic disease, including enlargement of the right renal mass. There is possible invasion of the right renal vein. new mild right hydroureteronephrosis, with slightly increased density within the right ureterovesicular junction which may be blood vs focal lesion.  UA: trace leukocytes and large occult blood, 20-30 RBC, 4-10 WBC with occasional bacteria    Assessment: This is a 74-year-old female with currently 6/10 right-sided flank pain that is slowly becoming sharp in nature.  Unlikely bacterial etiology.  Symptoms likely due to disease progression.    Plan:  Monitor off antibiotics  Follow-up on urine culture  Supportive care   Pain regimen in place as this is likely due to progression of metastatic disease   Palliative care consult in place, plan for goals of care discussion   Patient and family have elected for rehab placement prior to initiation of hospice care. Discharge planning at this time - Medically stable for discharge  Encounter for screening involving social determinants of health (SDoH)  Pt presented to [facility] yesterday and pt/family state she was not tended to during  night (minimal/no pain meds, left on bed pan, unsafe bed situation etc) and are refusing to return to [facility].   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 Treatments complicated by toxicities and infection.   Last treatment few weeks ago in April   MRI brain 5/13 showed stable enhancing lesions in the left occipital and parietal lobe with noted vasogenic edema.  Follows Dr. Castro for oncology, Dr Gamboa for Rad/onc, and follows palliative outpt   C/w decadron 3mg in the morning and 2mg in the evening   C/w Keppra 1000mg BID   C/w Bactrim -160mg MWF  Monitor mental status   Keppra levels supratherapeutic - no changes in regimen at this time    HTN (hypertension)  Norvasc 10 Mg  IV Labetalol as needed for SBP > 180  Anemia  Recent Labs     06/03/25  0502 06/05/25  0448   HGB 8.9* 9.5*     Anemia of chronic disease.  Hemoglobin baseline 9-10.  Continue to monitor.  Cancer associated pain  Continue with home Gabapentin 200 in am and 300 at bedtime, patient is on Narcan, methocarbamol, hydromorphone, oxycodone, MiraLAX, Tylenol  Insomnia  C/w Seroquel 25 mg qHS  Seroquel 12.5 prn for additional support  C/w Remeron 7.5 at bedtime   Optimize sleep hygiene  History of Pneumocystis jirovecii pneumonia  Continue Bactrim -160mg MWF  Hypothyroidism  Continue with home levothyroxine 50 mcg daily  History of stroke  R frontal subacute infarct, noted on imaging on 04/21/25  C/w aspirin & statin.    VTE Pharmacologic Prophylaxis: VTE Score: 7 High Risk (Score >/= 5) - Pharmacological DVT Prophylaxis Ordered: enoxaparin (Lovenox). Sequential Compression Devices Ordered.    Mobility:   Basic Mobility Inpatient Raw Score: 10  JH-HLM Goal: 4: Move to chair/commode  JH-HLM Achieved: 4: Move to chair/commode  JH-HLM Goal achieved. Continue to encourage appropriate  mobility.    Patient Centered Rounds: I performed bedside rounds with nursing staff today.   Discussions with Specialists or Other Care Team Provider: Case management    Education and Discussions with Family / Patient: Updated  (daughter) at bedside.    Current Length of Stay: 4 day(s)  Current Patient Status: Inpatient   Certification Statement: Medically stable for discharge pending placement  Discharge Plan: Medically stable for discharge pending placement    Code Status: Level 3 - DNAR and DNI    Subjective   Patient seen and examined at bedside, no acute events overnight.  She denies any pains or discomforts at this time.  She is tolerating her diet well.  She has no complaints at this time and is ready to leave the hospital.    Objective :  Temp:  [97.7 °F (36.5 °C)-98.4 °F (36.9 °C)] 98.3 °F (36.8 °C)  HR:  [75-82] 75  BP: (112-131)/(74-81) 112/74  Resp:  [18] 18  SpO2:  [90 %-93 %] 91 %  O2 Device: None (Room air)    Body mass index is 21.77 kg/m².     Input and Output Summary (last 24 hours):     Intake/Output Summary (Last 24 hours) at 6/5/2025 1446  Last data filed at 6/5/2025 0600  Gross per 24 hour   Intake 240 ml   Output 0 ml   Net 240 ml       Physical Exam  Constitutional:       General: She is not in acute distress.     Appearance: She is ill-appearing.   HENT:      Mouth/Throat:      Mouth: Mucous membranes are moist.      Pharynx: Oropharynx is clear.     Cardiovascular:      Rate and Rhythm: Normal rate and regular rhythm.      Pulses: Normal pulses.      Heart sounds: Normal heart sounds. No murmur heard.  Pulmonary:      Effort: Pulmonary effort is normal. No respiratory distress.      Breath sounds: Normal breath sounds. No wheezing, rhonchi or rales.   Abdominal:      General: There is no distension.      Palpations: Abdomen is soft.      Tenderness: There is no abdominal tenderness. There is no guarding or rebound.     Musculoskeletal:      Right lower leg: No edema.      Left  lower leg: No edema.     Skin:     General: Skin is warm and dry.     Neurological:      Mental Status: She is alert and oriented to person, place, and time.      Motor: Weakness present.      Comments: Stable neurologic exam 0/5 strength upper and lower left extremity with sensory deficit   Psychiatric:         Mood and Affect: Mood normal.         Behavior: Behavior normal.           Lines/Drains:              Lab Results: I have reviewed the following results:   Results from last 7 days   Lab Units 06/05/25 0448 06/03/25  0502   WBC Thousand/uL 12.76* 14.78*   HEMOGLOBIN g/dL 9.5* 8.9*   HEMATOCRIT % 30.0* 29.5*   PLATELETS Thousands/uL 238 256   BANDS PCT %  --  3   LYMPHO PCT %  --  2*   MONO PCT %  --  6   EOS PCT %  --  1     Results from last 7 days   Lab Units 06/05/25 0448 06/01/25  0557 05/31/25  1305   SODIUM mmol/L 138   < > 136   POTASSIUM mmol/L 5.1   < > 4.3   CHLORIDE mmol/L 105   < > 104   CO2 mmol/L 25   < > 23   BUN mg/dL 31*   < > 29*   CREATININE mg/dL 0.78   < > 0.84   ANION GAP mmol/L 8   < > 9   CALCIUM mg/dL 8.8   < > 9.4   ALBUMIN g/dL  --   --  3.5   TOTAL BILIRUBIN mg/dL  --   --  0.19*   ALK PHOS U/L  --   --  52   ALT U/L  --   --  12   AST U/L  --   --  9*   GLUCOSE RANDOM mg/dL 93   < > 123    < > = values in this interval not displayed.                 Results from last 7 days   Lab Units 05/31/25  1305   PROCALCITONIN ng/ml 0.16       Recent Cultures (last 7 days):               Last 24 Hours Medication List:     Current Facility-Administered Medications:     acetaminophen (TYLENOL) tablet 975 mg, Q8H KRISS    aluminum-magnesium hydroxide-simethicone (MAALOX) oral suspension 30 mL, Q4H PRN    amLODIPine (NORVASC) tablet 10 mg, Daily    aspirin chewable tablet 81 mg, Daily    atorvastatin (LIPITOR) tablet 40 mg, Daily With Dinner    bisacodyl (DULCOLAX) rectal suppository 10 mg, Daily PRN    cyanocobalamin (VITAMIN B-12) tablet 1,000 mcg, Daily    dexamethasone (DECADRON) tablet 2  mg, Daily **AND** dexamethasone (DECADRON) tablet 3 mg, Daily    enoxaparin (LOVENOX) subcutaneous injection 40 mg, Daily    gabapentin (NEURONTIN) capsule 200 mg, Daily **AND** gabapentin (NEURONTIN) capsule 300 mg, HS    HYDROmorphone (DILAUDID) tablet 2 mg, Q4H PRN    HYDROmorphone (DILAUDID) tablet 4 mg, Q4H PRN    HYDROmorphone (DILAUDID) tablet 4 mg, HS    HYDROmorphone HCl (DILAUDID) injection 0.2 mg, Q3H PRN    labetalol (NORMODYNE) injection 10 mg, Q6H PRN    levETIRAcetam (KEPPRA) tablet 1,000 mg, BID    levothyroxine tablet 50 mcg, Early Morning    lidocaine (LIDODERM) 5 % patch 1 patch, Daily    LORazepam (ATIVAN) injection 2 mg, Q8H PRN    methocarbamol (ROBAXIN) tablet 250 mg, Q6H PRN    mirtazapine (REMERON) tablet 7.5 mg, HS    naloxone (NARCAN) intranasal 2 mg, Daily PRN    pantoprazole (PROTONIX) EC tablet 40 mg, Daily Before Breakfast    polyethylene glycol (MIRALAX) packet 17 g, Daily    QUEtiapine (SEROquel) tablet 12.5 mg, Daily PRN    QUEtiapine (SEROquel) tablet 25 mg, HS    senna (SENOKOT) tablet 17.2 mg, Daily    sulfamethoxazole-trimethoprim (BACTRIM DS) 800-160 mg per tablet 1 tablet, Once per day on Monday Wednesday Friday    Administrative Statements   Today, Patient Was Seen By: Greg Isaac Jr, DO      **Please Note: This note may have been constructed using a voice recognition system.**

## 2025-06-05 NOTE — PLAN OF CARE
Problem: PAIN - ADULT  Goal: Verbalizes/displays adequate comfort level or baseline comfort level  Description: Interventions:  - Encourage patient to monitor pain and request assistance  - Assess pain using appropriate pain scale  - Administer analgesics as ordered based on type and severity of pain and evaluate response  - Implement non-pharmacological measures as appropriate and evaluate response  - Consider cultural and social influences on pain and pain management  - Notify physician/advanced practitioner if interventions unsuccessful or patient reports new pain  - Educate patient/family on pain management process including their role and importance of  reporting pain   - Provide non-pharmacologic/complimentary pain relief interventions  6/5/2025 1848 by Astrid Duffy RN  Outcome: Progressing  6/5/2025 1847 by Astrid Duffy RN  Outcome: Progressing     Problem: INFECTION - ADULT  Goal: Absence or prevention of progression during hospitalization  Description: INTERVENTIONS:  - Assess and monitor for signs and symptoms of infection  - Monitor lab/diagnostic results  - Monitor all insertion sites, i.e. indwelling lines, tubes, and drains  - Monitor endotracheal if appropriate and nasal secretions for changes in amount and color  - [place] appropriate cooling/warming therapies per order  - Administer medications as ordered  - Instruct and encourage patient and family to use good hand hygiene technique  - Identify and instruct in appropriate isolation precautions for identified infection/condition  6/5/2025 1848 by Astrid Duffy RN  Outcome: Progressing  6/5/2025 1847 by Astrid Duffy RN  Outcome: Progressing  Goal: Absence of fever/infection during neutropenic period  Description: INTERVENTIONS:  - Monitor WBC  - Perform strict hand hygiene  - Limit to healthy visitors only  - No plants, dried, fresh or silk flowers with patterson in patient room  6/5/2025 1848 by Astrid Duffy RN  Outcome: Progressing  6/5/2025  1847 by Astrid Duffy RN  Outcome: Progressing     Problem: SAFETY ADULT  Goal: Patient will remain free of falls  Description: INTERVENTIONS:  - Educate patient/family on patient safety including physical limitations  - Instruct patient to call for assistance with activity   - Consider consulting OT/PT to assist with strengthening/mobility based on AM PAC & JH-HLM score  - Consult OT/PT to assist with strengthening/mobility   - Keep Call bell within reach  - Keep bed low and locked with side rails adjusted as appropriate  - Keep care items and personal belongings within reach  - Initiate and maintain comfort rounds  - Make Fall Risk Sign visible to staff  - Offer Toileting every  Hours, in advance of need  - Initiate/Maintain alarm  - Obtain necessary fall risk management equipment:   - Apply yellow socks and bracelet for high fall risk patients  - Consider moving patient to room near nurses station  6/5/2025 1848 by Astrid Duffy RN  Outcome: Progressing  6/5/2025 1847 by Astrid Duffy RN  Outcome: Progressing  Goal: Maintain or return to baseline ADL function  Description: INTERVENTIONS:  -  Assess patient's ability to carry out ADLs; assess patient's baseline for ADL function and identify physical deficits which impact ability to perform ADLs (bathing, care of mouth/teeth, toileting, grooming, dressing, etc.)  - Assess/evaluate cause of self-care deficits   - Assess range of motion  - Assess patient's mobility; develop plan if impaired  - Assess patient's need for assistive devices and provide as appropriate  - Encourage maximum independence but intervene and supervise when necessary  - Involve family in performance of ADLs  - Assess for home care needs following discharge   - Consider OT consult to assist with ADL evaluation and planning for discharge  - Provide patient education as appropriate  - Monitor functional capacity and physical performance, use of AM PAC & JH-HLM   - Monitor gait, balance and fatigue  with ambulation    6/5/2025 1848 by Astrid Duffy RN  Outcome: Progressing  6/5/2025 1847 by Astrid Duffy RN  Outcome: Progressing  Goal: Maintains/Returns to pre admission functional level  Description: INTERVENTIONS:  - Perform AM-PAC 6 Click Basic Mobility/ Daily Activity assessment daily.  - Set and communicate daily mobility goal to care team and patient/family/caregiver.   - Collaborate with rehabilitation services on mobility goals if consulted  - Perform Range of Motion  times a day.  - Reposition patient every  hours.  - Dangle patient  times a day  - Stand patient  times a day  - Ambulate patient  times a day  - Out of bed to chair  times a day   - Out of bed for meals  times a day  - Out of bed for toileting  - Record patient progress and toleration of activity level   6/5/2025 1848 by Astrid Duffy RN  Outcome: Progressing  6/5/2025 1847 by Astrid Duffy RN  Outcome: Progressing     Problem: DISCHARGE PLANNING  Goal: Discharge to home or other facility with appropriate resources  Description: INTERVENTIONS:  - Identify barriers to discharge w/patient and caregiver  - Arrange for needed discharge resources and transportation as appropriate  - Identify discharge learning needs (meds, wound care, etc.)  - Arrange for interpretive services to assist at discharge as needed  - Refer to Case Management Department for coordinating discharge planning if the patient needs post-hospital services based on physician/advanced practitioner order or complex needs related to functional status, cognitive ability, or social support system  6/5/2025 1848 by Astrid Duffy RN  Outcome: Progressing  6/5/2025 1847 by Astrid Duffy RN  Outcome: Progressing     Problem: Prexisting or High Potential for Compromised Skin Integrity  Goal: Skin integrity is maintained or improved  Description: INTERVENTIONS:  - Identify patients at risk for skin breakdown  - Assess and monitor skin integrity including under and around medical  devices   - Assess and monitor nutrition and hydration status  - Monitor labs  - Assess for incontinence   - Turn and reposition patient  - Assist with mobility/ambulation  - Relieve pressure over sandro prominences   - Avoid friction and shearing  - Provide appropriate hygiene as needed including keeping skin clean and dry  - Evaluate need for skin moisturizer/barrier cream  - Collaborate with interdisciplinary team  - Patient/family teaching  - Consider wound care consult    Assess:  - Review Benjamín scale daily  - Clean and moisturize skin every   - Inspect skin when repositioning, toileting, and assisting with ADLS  - Assess under medical devices such as  every   - Assess extremities for adequate circulation and sensation     Bed Management:  - Have minimal linens on bed & keep smooth, unwrinkled  - Change linens as needed when moist or perspiring  - Avoid sitting or lying in one position for more than  hours while in bed?Keep HOB at degrees   - Toileting:  - Offer bedside commode  - Assess for incontinence every   - Use incontinent care products after each incontinent episode such as     Activity:  - Mobilize patient  times a day  - Encourage activity and walks on unit  - Encourage or provide ROM exercises   - Turn and reposition patient every  Hours  - Use appropriate equipment to lift or move patient in bed  - Instruct/ Assist with weight shifting every  when out of bed in chair  - Consider limitation of chair time  hour intervals    Skin Care:  - Avoid use of baby powder, tape, friction and shearing, hot water or constrictive clothing  - Relieve pressure over bony prominences using   - Do not massage red bony areas    Next Steps:  - Teach patient strategies to minimize risks such as   - Consider consults to  interdisciplinary teams such as 6/5/2025 1848 by Astrid Duffy RN  Outcome: Progressing  6/5/2025 1847 by Astrid Duffy RN  Outcome: Progressing

## 2025-06-05 NOTE — PROGRESS NOTES
Patient:  SURYA PUENTE    MRN:  [id number]    Aidin Request ID:  [id number]    Level of care reserved:  Skilled Nursing Facility    Partner Reserved:  [facility], [place] [address] [phone]    Clinical needs requested:    Geography searched:  10 miles around [id number]    Start of Service:    Request sent:  9:19am EDT on 6/2/2025 by Dana Severini    Partner reserved:  2:39pm EDT on 6/5/2025 by Earle Mera    Choice list shared:  2:39pm EDT on 6/5/2025 by Earle Mera

## 2025-06-06 LAB
ANION GAP SERPL CALCULATED.3IONS-SCNC: 7 MMOL/L (ref 4–13)
ANISOCYTOSIS BLD QL SMEAR: PRESENT
BASOPHILS # BLD MANUAL: 0 THOUSAND/UL (ref 0–0.1)
BASOPHILS NFR MAR MANUAL: 0 % (ref 0–1)
BUN SERPL-MCNC: 31 MG/DL (ref 5–25)
CALCIUM SERPL-MCNC: 9.2 MG/DL (ref 8.4–10.2)
CHLORIDE SERPL-SCNC: 103 MMOL/L (ref 96–108)
CO2 SERPL-SCNC: 27 MMOL/L (ref 21–32)
CREAT SERPL-MCNC: 0.73 MG/DL (ref 0.6–1.3)
EOSINOPHIL # BLD MANUAL: 0 THOUSAND/UL (ref 0–0.4)
EOSINOPHIL NFR BLD MANUAL: 0 % (ref 0–6)
ERYTHROCYTE [DISTWIDTH] IN BLOOD BY AUTOMATED COUNT: 16.5 % (ref 11.6–15.1)
GFR SERPL CREATININE-BSD FRML MDRD: 81 ML/MIN/1.73SQ M
GIANT PLATELETS BLD QL SMEAR: PRESENT
GLUCOSE SERPL-MCNC: 100 MG/DL (ref 65–140)
HCT VFR BLD AUTO: 35.3 % (ref 34.8–46.1)
HGB BLD-MCNC: 11 G/DL (ref 11.5–15.4)
LYMPHOCYTES # BLD AUTO: 0.56 THOUSAND/UL (ref 0.6–4.47)
LYMPHOCYTES # BLD AUTO: 3 % (ref 14–44)
MCH RBC QN AUTO: 28.2 PG (ref 26.8–34.3)
MCHC RBC AUTO-ENTMCNC: 31.2 G/DL (ref 31.4–37.4)
MCV RBC AUTO: 91 FL (ref 82–98)
METAMYELOCYTE ABSOLUTE CT: 0.56 THOUSAND/UL (ref 0–0.1)
METAMYELOCYTES NFR BLD MANUAL: 4 % (ref 0–1)
MONOCYTES # BLD AUTO: 0.42 THOUSAND/UL (ref 0–1.22)
MONOCYTES NFR BLD: 3 % (ref 4–12)
MYELOCYTE ABSOLUTE CT: 0.56 THOUSAND/UL (ref 0–0.1)
MYELOCYTES NFR BLD MANUAL: 4 % (ref 0–1)
NEUTROPHILS # BLD MANUAL: 11.94 THOUSAND/UL (ref 1.85–7.62)
NEUTS BAND NFR BLD MANUAL: 1 % (ref 0–8)
NEUTS SEG NFR BLD AUTO: 84 % (ref 43–75)
PLATELET # BLD AUTO: 273 THOUSANDS/UL (ref 149–390)
PLATELET BLD QL SMEAR: ADEQUATE
PMV BLD AUTO: 9.9 FL (ref 8.9–12.7)
POIKILOCYTOSIS BLD QL SMEAR: PRESENT
POTASSIUM SERPL-SCNC: 5 MMOL/L (ref 3.5–5.3)
RBC # BLD AUTO: 3.9 MILLION/UL (ref 3.81–5.12)
RBC MORPH BLD: PRESENT
SODIUM SERPL-SCNC: 137 MMOL/L (ref 135–147)
VARIANT LYMPHS # BLD AUTO: 1 %
WBC # BLD AUTO: 14.05 THOUSAND/UL (ref 4.31–10.16)

## 2025-06-06 PROCEDURE — 85027 COMPLETE CBC AUTOMATED: CPT

## 2025-06-06 PROCEDURE — 85007 BL SMEAR W/DIFF WBC COUNT: CPT

## 2025-06-06 PROCEDURE — 99232 SBSQ HOSP IP/OBS MODERATE 35: CPT | Performed by: HOSPITALIST

## 2025-06-06 PROCEDURE — 80048 BASIC METABOLIC PNL TOTAL CA: CPT

## 2025-06-06 PROCEDURE — 97530 THERAPEUTIC ACTIVITIES: CPT

## 2025-06-06 RX ADMIN — LEVOTHYROXINE SODIUM 50 MCG: 0.05 TABLET ORAL at 05:17

## 2025-06-06 RX ADMIN — HYDROMORPHONE HYDROCHLORIDE 2 MG: 2 TABLET ORAL at 09:00

## 2025-06-06 RX ADMIN — ACETAMINOPHEN 975 MG: 325 TABLET ORAL at 21:31

## 2025-06-06 RX ADMIN — ATORVASTATIN CALCIUM 40 MG: 40 TABLET, FILM COATED ORAL at 15:47

## 2025-06-06 RX ADMIN — LEVETIRACETAM 1000 MG: 500 TABLET, FILM COATED ORAL at 21:31

## 2025-06-06 RX ADMIN — PANTOPRAZOLE SODIUM 40 MG: 40 TABLET, DELAYED RELEASE ORAL at 05:17

## 2025-06-06 RX ADMIN — ACETAMINOPHEN 975 MG: 325 TABLET ORAL at 12:59

## 2025-06-06 RX ADMIN — HYDROMORPHONE HYDROCHLORIDE 4 MG: 4 TABLET ORAL at 21:31

## 2025-06-06 RX ADMIN — ENOXAPARIN SODIUM 40 MG: 40 INJECTION SUBCUTANEOUS at 08:59

## 2025-06-06 RX ADMIN — GABAPENTIN 300 MG: 300 CAPSULE ORAL at 21:30

## 2025-06-06 RX ADMIN — AMLODIPINE BESYLATE 10 MG: 10 TABLET ORAL at 08:59

## 2025-06-06 RX ADMIN — QUETIAPINE 25 MG: 25 TABLET ORAL at 21:30

## 2025-06-06 RX ADMIN — SENNOSIDES 17.2 MG: 8.6 TABLET, FILM COATED ORAL at 09:00

## 2025-06-06 RX ADMIN — DEXAMETHASONE 3 MG: 2 TABLET ORAL at 08:59

## 2025-06-06 RX ADMIN — ACETAMINOPHEN 975 MG: 325 TABLET ORAL at 05:16

## 2025-06-06 RX ADMIN — GABAPENTIN 200 MG: 100 CAPSULE ORAL at 08:59

## 2025-06-06 RX ADMIN — ASPIRIN 81 MG: 81 TABLET, CHEWABLE ORAL at 09:00

## 2025-06-06 RX ADMIN — LEVETIRACETAM 1000 MG: 500 TABLET, FILM COATED ORAL at 08:59

## 2025-06-06 RX ADMIN — MIRTAZAPINE 7.5 MG: 15 TABLET, FILM COATED ORAL at 21:31

## 2025-06-06 RX ADMIN — SULFAMETHOXAZOLE AND TRIMETHOPRIM 1 TABLET: 800; 160 TABLET ORAL at 09:01

## 2025-06-06 RX ADMIN — POLYETHYLENE GLYCOL 3350 17 G: 17 POWDER, FOR SOLUTION ORAL at 09:01

## 2025-06-06 RX ADMIN — CYANOCOBALAMIN TAB 500 MCG 1000 MCG: 500 TAB at 09:00

## 2025-06-06 RX ADMIN — DEXAMETHASONE 2 MG: 2 TABLET ORAL at 15:48

## 2025-06-06 NOTE — ASSESSMENT & PLAN NOTE
Recent Labs     06/05/25  0448 06/06/25  0540   HGB 9.5* 11.0*     Anemia of chronic disease.  Hemoglobin baseline 9-10.  Continue to monitor.

## 2025-06-06 NOTE — PLAN OF CARE
Problem: PAIN - ADULT  Goal: Verbalizes/displays adequate comfort level or baseline comfort level  Description: Interventions:  - Encourage patient to monitor pain and request assistance  - Assess pain using appropriate pain scale  - Administer analgesics as ordered based on type and severity of pain and evaluate response  - Implement non-pharmacological measures as appropriate and evaluate response  - Consider cultural and social influences on pain and pain management  - Notify physician/advanced practitioner if interventions unsuccessful or patient reports new pain  - Educate patient/family on pain management process including their role and importance of  reporting pain   - Provide non-pharmacologic/complimentary pain relief interventions  Outcome: Progressing     Problem: INFECTION - ADULT  Goal: Absence or prevention of progression during hospitalization  Description: INTERVENTIONS:  - Assess and monitor for signs and symptoms of infection  - Monitor lab/diagnostic results  - Monitor all insertion sites, i.e. indwelling lines, tubes, and drains  - Monitor endotracheal if appropriate and nasal secretions for changes in amount and color  - [place] appropriate cooling/warming therapies per order  - Administer medications as ordered  - Instruct and encourage patient and family to use good hand hygiene technique  - Identify and instruct in appropriate isolation precautions for identified infection/condition  Outcome: Progressing  Goal: Absence of fever/infection during neutropenic period  Description: INTERVENTIONS:  - Monitor WBC  - Perform strict hand hygiene  - Limit to healthy visitors only  - No plants, dried, fresh or silk flowers with patterson in patient room  Outcome: Progressing     Problem: SAFETY ADULT  Goal: Patient will remain free of falls  Description: INTERVENTIONS:  - Educate patient/family on patient safety including physical limitations  - Instruct patient to call for assistance with activity   -  Consider consulting OT/PT to assist with strengthening/mobility based on AM PAC & JH-HLM score  - Consult OT/PT to assist with strengthening/mobility   - Keep Call bell within reach  - Keep bed low and locked with side rails adjusted as appropriate  - Keep care items and personal belongings within reach  - Initiate and maintain comfort rounds  - Make Fall Risk Sign visible to staff  - Offer Toileting every  Hours, in advance of need  - Initiate/Maintain alarm  - Obtain necessary fall risk management equipment:   - Apply yellow socks and bracelet for high fall risk patients  - Consider moving patient to room near nurses station  Outcome: Progressing  Goal: Maintain or return to baseline ADL function  Description: INTERVENTIONS:  -  Assess patient's ability to carry out ADLs; assess patient's baseline for ADL function and identify physical deficits which impact ability to perform ADLs (bathing, care of mouth/teeth, toileting, grooming, dressing, etc.)  - Assess/evaluate cause of self-care deficits   - Assess range of motion  - Assess patient's mobility; develop plan if impaired  - Assess patient's need for assistive devices and provide as appropriate  - Encourage maximum independence but intervene and supervise when necessary  - Involve family in performance of ADLs  - Assess for home care needs following discharge   - Consider OT consult to assist with ADL evaluation and planning for discharge  - Provide patient education as appropriate  - Monitor functional capacity and physical performance, use of AM PAC & JH-HLM   - Monitor gait, balance and fatigue with ambulation    Outcome: Progressing  Goal: Maintains/Returns to pre admission functional level  Description: INTERVENTIONS:  - Perform AM-PAC 6 Click Basic Mobility/ Daily Activity assessment daily.  - Set and communicate daily mobility goal to care team and patient/family/caregiver.   - Collaborate with rehabilitation services on mobility goals if consulted  -  Perform Range of Motion  times a day.  - Reposition patient every  hours.  - Dangle patient  times a day  - Stand patient times a day  - Ambulate patient  times a day  - Out of bed to chair  times a day   - Out of bed for meals  times a day  - Out of bed for toileting  - Record patient progress and toleration of activity level   Outcome: Progressing     Problem: DISCHARGE PLANNING  Goal: Discharge to home or other facility with appropriate resources  Description: INTERVENTIONS:  - Identify barriers to discharge w/patient and caregiver  - Arrange for needed discharge resources and transportation as appropriate  - Identify discharge learning needs (meds, wound care, etc.)  - Arrange for interpretive services to assist at discharge as needed  - Refer to Case Management Department for coordinating discharge planning if the patient needs post-hospital services based on physician/advanced practitioner order or complex needs related to functional status, cognitive ability, or social support system  Outcome: Progressing     Problem: Knowledge Deficit  Goal: Patient/family/caregiver demonstrates understanding of disease process, treatment plan, medications, and discharge instructions  Description: Complete learning assessment and assess knowledge base.  Interventions:  - Provide teaching at level of understanding  - Provide teaching via preferred learning methods  Outcome: Progressing     Problem: Prexisting or High Potential for Compromised Skin Integrity  Goal: Skin integrity is maintained or improved  Description: INTERVENTIONS:  - Identify patients at risk for skin breakdown  - Assess and monitor skin integrity including under and around medical devices   - Assess and monitor nutrition and hydration status  - Monitor labs  - Assess for incontinence   - Turn and reposition patient  - Assist with mobility/ambulation  - Relieve pressure over sandro prominences   - Avoid friction and shearing  - Provide appropriate hygiene as  needed including keeping skin clean and dry  - Evaluate need for skin moisturizer/barrier cream  - Collaborate with interdisciplinary team  - Patient/family teaching  - Consider wound care consult    Assess:  - Review Benjamín scale daily  - Clean and moisturize skin every   - Inspect skin when repositioning, toileting, and assisting with ADLS  - Assess under medical devices such as every   - Assess extremities for adequate circulation and sensation     Bed Management:  - Have minimal linens on bed & keep smooth, unwrinkled  - Change linens as needed when moist or perspiring  - Avoid sitting or lying in one position for more than hours while in bed?Keep HOB at degrees   - Toileting:  - Offer bedside commode  - Assess for incontinence every   - Use incontinent care products after each incontinent episode such as     Activity:  - Mobilize patient  times a day  - Encourage activity and walks on unit  - Encourage or provide ROM exercises   - Turn and reposition patient every  Hours  - Use appropriate equipment to lift or move patient in bed  - Instruct/ Assist with weight shifting every  when out of bed in chair  - Consider limitation of chair time  hour intervals    Skin Care:  - Avoid use of baby powder, tape, friction and shearing, hot water or constrictive clothing  - Relieve pressure over bony prominences using   - Do not massage red bony areas    Next Steps:  - Teach patient strategies to minimize risks such as   - Consider consults to  interdisciplinary teams such as   Outcome: Progressing

## 2025-06-06 NOTE — PLAN OF CARE
Problem: PAIN - ADULT  Goal: Verbalizes/displays adequate comfort level or baseline comfort level  Description: Interventions:  - Encourage patient to monitor pain and request assistance  - Assess pain using appropriate pain scale  - Administer analgesics as ordered based on type and severity of pain and evaluate response  - Implement non-pharmacological measures as appropriate and evaluate response  - Consider cultural and social influences on pain and pain management  - Notify physician/advanced practitioner if interventions unsuccessful or patient reports new pain  - Educate patient/family on pain management process including their role and importance of  reporting pain   - Provide non-pharmacologic/complimentary pain relief interventions  Outcome: Progressing     Problem: INFECTION - ADULT  Goal: Absence or prevention of progression during hospitalization  Description: INTERVENTIONS:  - Assess and monitor for signs and symptoms of infection  - Monitor lab/diagnostic results  - Monitor all insertion sites, i.e. indwelling lines, tubes, and drains  - Monitor endotracheal if appropriate and nasal secretions for changes in amount and color  - [place] appropriate cooling/warming therapies per order  - Administer medications as ordered  - Instruct and encourage patient and family to use good hand hygiene technique  - Identify and instruct in appropriate isolation precautions for identified infection/condition  Outcome: Progressing  Goal: Absence of fever/infection during neutropenic period  Description: INTERVENTIONS:  - Monitor WBC  - Perform strict hand hygiene  - Limit to healthy visitors only  - No plants, dried, fresh or silk flowers with patterson in patient room  Outcome: Progressing     Problem: SAFETY ADULT  Goal: Patient will remain free of falls  Description: INTERVENTIONS:  - Educate patient/family on patient safety including physical limitations  - Instruct patient to call for assistance with activity   -  Consider consulting OT/PT to assist with strengthening/mobility based on AM PAC & JH-HLM score  - Consult OT/PT to assist with strengthening/mobility   - Keep Call bell within reach  - Keep bed low and locked with side rails adjusted as appropriate  - Keep care items and personal belongings within reach  - Initiate and maintain comfort rounds  - Make Fall Risk Sign visible to staff  - Offer Toileting every  Hours, in advance of need  - Initiate/Maintainalarm  - Obtain necessary fall risk management equipment:   - Apply yellow socks and bracelet for high fall risk patients  - Consider moving patient to room near nurses station  Outcome: Progressing  Goal: Maintain or return to baseline ADL function  Description: INTERVENTIONS:  -  Assess patient's ability to carry out ADLs; assess patient's baseline for ADL function and identify physical deficits which impact ability to perform ADLs (bathing, care of mouth/teeth, toileting, grooming, dressing, etc.)  - Assess/evaluate cause of self-care deficits   - Assess range of motion  - Assess patient's mobility; develop plan if impaired  - Assess patient's need for assistive devices and provide as appropriate  - Encourage maximum independence but intervene and supervise when necessary  - Involve family in performance of ADLs  - Assess for home care needs following discharge   - Consider OT consult to assist with ADL evaluation and planning for discharge  - Provide patient education as appropriate  - Monitor functional capacity and physical performance, use of AM PAC & JH-HLM   - Monitor gait, balance and fatigue with ambulation    Outcome: Progressing  Goal: Maintains/Returns to pre admission functional level  Description: INTERVENTIONS:  - Perform AM-PAC 6 Click Basic Mobility/ Daily Activity assessment daily.  - Set and communicate daily mobility goal to care team and patient/family/caregiver.   - Collaborate with rehabilitation services on mobility goals if consulted  -  Perform Range of Motion  times a day.  - Reposition patient every  hours.  - Dangle patient  times a day  - Stand patient  times a day  - Ambulate patient  times a day  - Out of bed to chair  times a day   - Out of bed for meals  times a day  - Out of bed for toileting  - Record patient progress and toleration of activity level   Outcome: Progressing     Problem: DISCHARGE PLANNING  Goal: Discharge to home or other facility with appropriate resources  Description: INTERVENTIONS:  - Identify barriers to discharge w/patient and caregiver  - Arrange for needed discharge resources and transportation as appropriate  - Identify discharge learning needs (meds, wound care, etc.)  - Arrange for interpretive services to assist at discharge as needed  - Refer to Case Management Department for coordinating discharge planning if the patient needs post-hospital services based on physician/advanced practitioner order or complex needs related to functional status, cognitive ability, or social support system  Outcome: Progressing     Problem: Knowledge Deficit  Goal: Patient/family/caregiver demonstrates understanding of disease process, treatment plan, medications, and discharge instructions  Description: Complete learning assessment and assess knowledge base.  Interventions:  - Provide teaching at level of understanding  - Provide teaching via preferred learning methods  Outcome: Progressing     Problem: Prexisting or High Potential for Compromised Skin Integrity  Goal: Skin integrity is maintained or improved  Description: INTERVENTIONS:  - Identify patients at risk for skin breakdown  - Assess and monitor skin integrity including under and around medical devices   - Assess and monitor nutrition and hydration status  - Monitor labs  - Assess for incontinence   - Turn and reposition patient  - Assist with mobility/ambulation  - Relieve pressure over sandro prominences   - Avoid friction and shearing  - Provide appropriate hygiene  as needed including keeping skin clean and dry  - Evaluate need for skin moisturizer/barrier cream  - Collaborate with interdisciplinary team  - Patient/family teaching  - Consider wound care consult    Assess:  - Review Benjamín scale daily  - Clean and moisturize skin every   - Inspect skin when repositioning, toileting, and assisting with ADLS  - Assess under medical devices such as  every   - Assess extremities for adequate circulation and sensation     Bed Management:  - Have minimal linens on bed & keep smooth, unwrinkled  - Change linens as needed when moist or perspiring  - Avoid sitting or lying in one position for more than  hours while in bed?Keep HOB at degrees   - Toileting:  - Offer bedside commode  - Assess for incontinence every   - Use incontinent care products after each incontinent episode such as     Activity:  - Mobilize patient  times a day  - Encourage activity and walks on unit  - Encourage or provide ROM exercises   - Turn and reposition patient every  Hours  - Use appropriate equipment to lift or move patient in bed  - Instruct/ Assist with weight shifting every when out of bed in chair  - Consider limitation of chair time  hour intervals    Skin Care:  - Avoid use of baby powder, tape, friction and shearing, hot water or constrictive clothing  - Relieve pressure over bony prominences using   - Do not massage red bony areas    Next Steps:  - Teach patient strategies to minimize risks such as  - Consider consults to  interdisciplinary teams such as   Outcome: Progressing
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
## 2025-06-06 NOTE — PHYSICAL THERAPY NOTE
"   PHYSICAL THERAPY TREATMENT  DATE: 06/06/25  TIME: 1458-1523    NAME:  Ayla Nye  AGE:   74 y.o.  Mrn:   [id number]  Length Of Stay: 5    ADMIT DX:  Right flank pain [R10.9]    Past Medical History[1]  Past Surgical History[2]    Performed at least 2 patient identifiers during session: Name, Birthday, and Epic photo     06/06/25 1523   PT Last Visit   PT Visit Date 06/06/25   Note Type   Note Type Treatment   Pain Assessment   Pain Assessment Tool 0-10   Pain Score 5   Pain Location/Orientation Orientation: Lower;Location: Back   Pain Onset/Description Onset: Ongoing;Descriptor: Aching;Descriptor: Discomfort   Effect of Pain on Daily Activities limits comfort and positioning   Patient's Stated Pain Goal No pain   Hospital Pain Intervention(s) Repositioned;Ambulation/increased activity;Emotional support   Multiple Pain Sites No   Restrictions/Precautions   Weight Bearing Precautions Per Order No   Braces or Orthoses Sling  (per pt report rehab was utilizing sling for shoulder and arm protection, no sling present in room at time of PT tx)   Other Precautions Cognitive;Chair Alarm;Bed Alarm;Multiple lines;Fall Risk;Pain  (L hemiplegia)   General   Chart Reviewed Yes   Additional Pertinent History No significant change in status from previous session.   Response to Previous Treatment Patient with no complaints from previous session.   Family/Caregiver Present No   Cognition   Overall Cognitive Status Impaired   Arousal/Participation Alert;Cooperative   Attention Attends with cues to redirect   Orientation Level Oriented to person;Oriented to place;Oriented to situation   Memory Decreased short term memory;Decreased recall of recent events;Decreased recall of precautions   Following Commands Follows one step commands without difficulty   Subjective   Subjective \"I've never seen this device before\" - talking about QuickMove, despite multiple recent sessions working with it.   Bed Mobility   Rolling L 5  " Supervision   Additional items Assist x 1;HOB elevated;Bedrails;Increased time required;Verbal cues   Supine to Sit 4  Minimal assistance   Additional items Assist x 1;HOB elevated;Bedrails;Increased time required;Verbal cues  (trunk management)   Sit to Supine   (NT as pt was left seated OOB in recliner chair with alarm engaged at end of session)   Additional Comments Pt initially requiring MODA to maintain upright sitting EOB d/t L hemiplegia and significant L sided lean. However therapist elevated R sided bedrail, and with u/l UE grasp, pt was able to maintain upright static sitting with close S. Intermittent KEE needed for trunk support during dynamic sitting tasks.   Transfers   Sit to Stand 4  Minimal assistance   Additional items Assist x 1;Increased time required;Verbal cues;Other  (QUICKMOVE, R UE support on crossbar, B knees blocked by quickmove)   Stand to Sit 3  Moderate assistance   Additional items Assist x 1;Increased time required;Verbal cues;Other  (QUICKMOVE, R UE support on crossbar, B knees blocked by quickmove)   Stand pivot 1  Dependent   Additional items Assist x 1;Verbal cues  (QUICKMOVE)   Additional Comments Pt completes x3 STS transfers on QuickMove, all with KEE to achieve and maintain supported standing on QuickMove & MODA for safe and controlled descent to target surfaces.   Ambulation/Elevation   Gait pattern Not tested;Not appropriate   Balance   Static Sitting Fair -   Dynamic Sitting Poor +   Static Standing Poor +  (on QuickMove with R UE supported on crossbar)   Endurance Deficit   Endurance Deficit Yes   Activity Tolerance   Activity Tolerance Patient tolerated treatment well;Patient limited by pain   Nurse Made Aware Spoke with CM, RN, PCA   Assessment   Prognosis Fair   Problem List Decreased strength;Decreased range of motion;Decreased endurance;Impaired balance;Decreased mobility;Decreased cognition;Impaired judgement;Decreased safety awareness;Impaired tone;Decreased skin  integrity;Pain   Assessment Pt seen for PT treatment 6/6/2025 with focus on: therapeutic activity, with intervention focusing on goal of increased independence with transfers and improved standing tolerance. Pt presents semi-supine in bed, is agreeable to participate in session. Therapist discussed utilization of QuickMove STS device, however pt denies ever seeing/using before, despite multiple recent treatment sessions involving device. During session, pt requires KEE for bed mobility in hospital bed, variable close S to KEE to maintain upright sitting balance at EOB w/ R UE support on bedrail, KEE-MODA for STS transfers to/from QuickMove manual STS lift, and KEE to maintain upright supported standing on QuickMove. At end of session pt able to tolerate sitting OOB in recliner chair, with B wedges to ensure upright trunk positioning, and L UE elevated on pillows in order to avoid dependent positioning. Overall pt displays significant improvement in transfer ability and activity tolerance on this date, however continues to perform below their baseline level of functional mobility due to weakness, impaired balance, impaired cognition and insight. Pt continues to most appropriately benefit from Level II (moderate PT intensity) resources upon d/c from the acute care setting. Continue skilled PT POC as able and appropriate in order to progress towards therapy goals and maximize independence with functional mobility. Next session, plan for intervention of additional transfer training with use of lesser restrictive unilateral device such as rae-walker, as able and appropriate.   Barriers to Discharge Decreased caregiver support;Inaccessible home environment   Goals   Patient Goals no rehab goals stated on this date   STG Expiration Date 06/11/25   Short Term Goal #1 Goals: Pt will: Perform rolling  and supine<>sit bed mobility tasks with no more than min A to prepare for transfers and reposition in bed. Perform  sit<>stand and pivot transfers with no more than min A to decrease burden of care. Perform ambulation with LRAD and possible bracing for 10' with no more than min A to promote proper use of assistive device and get back to pt's goal of ambulation   Propel wheelchair for 50 feet with no more than min assist as alternative to ambulation.  Assess stairs in the future and set goals to promote patient's return to home environment with steps to enter   PT Treatment Day 4   Plan   Treatment/Interventions Functional transfer training;LE strengthening/ROM;Therapeutic exercise;Endurance training;Cognitive reorientation;Patient/family training;Equipment eval/education;Bed mobility;Gait training;Compensatory technique education;Spoke to case management;Spoke to nursing  (WC mobility training)   Progress Progressing toward goals   PT Frequency 3-5x/wk   Discharge Recommendation   Rehab Resource Intensity Level, PT II (Moderate Resource Intensity)   AM-PAC Basic Mobility Inpatient   Turning in Flat Bed Without Bedrails 2   Lying on Back to Sitting on Edge of Flat Bed Without Bedrails 2   Moving Bed to Chair 1   Standing Up From Chair Using Arms 3   Walk in Room 1   Climb 3-5 Stairs With Railing 1   Basic Mobility Inpatient Raw Score 10   Turning Head Towards Sound 4   Follow Simple Instructions 3   Low Function Basic Mobility Raw Score  17   Low Function Basic Mobility Standardized Score  27.46   [facility] Highest Level Of Mobility   -[facility] Goal 4: Move to chair/commode   -HLM Achieved 5: Stand (1 or more minutes)   Education   Education Provided Mobility training;Assistive device   Patient Explanation/teachback used;Reinforcement needed   End of Consult   Patient Position at End of Consult Bedside chair;Bed/Chair alarm activated;All needs within reach     Based on patient's [facility] Highest Level of Mobility scores today, patient currently has a goal of -[facility] Levels: 5: STAND (1 OR MORE MINUTES), to be completed with  RN staffing each shift, in order to improve overall activity tolerance and mobility, combat hospital related deconditioning, and maximize outcomes for d/c from the acute care setting.     The patient's AM-PAC Basic Mobility Inpatient Short Form Raw Score is 10. A Raw score of less than or equal to 16 suggests the patient may benefit from discharge to post-acute rehabilitation services. Please also refer to the recommendation of the Physical Therapist for safe discharge planning.        Rebeca Simpson PT, DPT   Available via [employer]  NPI # [id number]  [place] License - [id number]  6/6/2025        [1]   Past Medical History:  Diagnosis Date    DILLON (acute kidney injury) (HCC) 02/14/2025    Allergic 2022    Allergic to neomiacin and cephalexin    Arthritis     Cancer (HCC)     lung cancer    Cancer (HCC)     mets to brain    Cancer (HCC)     perianal mass    Cataract Nov. 2023    Due to have durgery June 2024    Disease of thyroid gland     Essential thrombocytosis (HCC)     controlled with medication    History of transfusion     Hyperlipidemia 2015    Hypertension 2011    Mass in chest     Nodular goiter     Osteoporosis     Peripheral neuropathy     Pneumonia Oct 16, 2023    Also  Feb 2024    Psoriasis     SIRS (systemic inflammatory response syndrome) (HCC) 06/22/2024    Stroke (HCC)    [2]   Past Surgical History:  Procedure Laterality Date    APPENDECTOMY  1954    BREAST CYST EXCISION Right     COLONOSCOPY  10/31/2018    DXA PROCEDURE (HISTORICAL)  04/21/2017    IR BIOPSY OTHER  09/12/2024    IR LUMBAR PUNCTURE  09/12/2024    MAMMO (HISTORICAL)      8-24-18    MAMMO NEEDLE LOCALIZATION RIGHT (ALL INC) Right 07/13/2009    SKIN LESION EXCISION      bridge of nose

## 2025-06-06 NOTE — PLAN OF CARE
Problem: PHYSICAL THERAPY ADULT  Goal: Performs mobility at highest level of function for planned discharge setting.  See evaluation for individualized goals.  Description: Treatment/Interventions: Functional transfer training, LE strengthening/ROM, Therapeutic exercise, Patient/family training, Equipment eval/education, Bed mobility, Gait training, Endurance training, Spoke to nursing  Equipment Recommended:  (WC and  unilateral vs B device)       See flowsheet documentation for full assessment, interventions and recommendations.  Outcome: Progressing  Note: Prognosis: Fair  Problem List: Decreased strength, Decreased range of motion, Decreased endurance, Impaired balance, Decreased mobility, Decreased cognition, Impaired judgement, Decreased safety awareness, Impaired tone, Decreased skin integrity, Pain  Assessment: Pt seen for PT treatment 6/6/2025 with focus on: therapeutic activity, with intervention focusing on goal of increased independence with transfers and improved standing tolerance. Pt presents semi-supine in bed, is agreeable to participate in session. Therapist discussed utilization of QuickMove STS device, however pt denies ever seeing/using before, despite multiple recent treatment sessions involving device. During session, pt requires KEE for bed mobility in hospital bed, variable close S to KEE to maintain upright sitting balance at EOB w/ R UE support on bedrail, KEE-MODA for STS transfers to/from QuickMove manual STS lift, and KEE to maintain upright supported standing on QuickMove. At end of session pt able to tolerate sitting OOB in recliner chair, with B wedges to ensure upright trunk positioning, and L UE elevated on pillows in order to avoid dependent positioning. Overall pt displays significant improvement in transfer ability and activity tolerance on this date, however continues to perform below their baseline level of functional mobility due to weakness, impaired balance, impaired  cognition and insight. Pt continues to most appropriately benefit from Level II (moderate PT intensity) resources upon d/c from the acute care setting. Continue skilled PT POC as able and appropriate in order to progress towards therapy goals and maximize independence with functional mobility. Next session, plan for intervention of additional transfer training with use of lesser restrictive unilateral device such as rae-walker, as able and appropriate.  Barriers to Discharge: Decreased caregiver support, Inaccessible home environment  Barriers to Discharge Comments: lives alone and has HARRY  Rehab Resource Intensity Level, PT: II (Moderate Resource Intensity)    See flowsheet documentation for full assessment.

## 2025-06-06 NOTE — PROGRESS NOTES
Progress Note - Hospitalist   Name: Ayla Nye 74 y.o. female I MRN: [id number]  Unit/Bed#: W -01 I Date of Admission: 5/31/2025   Date of Service: 6/6/2025 I Hospital Day: 5    Assessment & Plan  Flank pain  Progressively worsening 9/10 dull ache right sided flank pain radiating to her abdomen and behind her leg that started yesterday.  Past three days her urine changed from clear to brown intermittently.     ED course-received 0.7 Dilaudid, Toradol 30 Mg, Zofran, 500 cc NS bolus    VS: on presentation hypertensive 204/101, last BP around for 165/88, satting 93% on 2 L (baseline RA)  Labs: WBC elevated at 16 but seems like this is her baseline in setting of steroid use, hemoglobin stable at 10.9, CMP pretty unremarkable, lipase and Pro-Du WNL  CT abd/pelv:  indicated progression of disease---diffuse metastatic disease, including enlargement of the right renal mass. There is possible invasion of the right renal vein. new mild right hydroureteronephrosis, with slightly increased density within the right ureterovesicular junction which may be blood vs focal lesion.  UA: trace leukocytes and large occult blood, 20-30 RBC, 4-10 WBC with occasional bacteria    Assessment: This is a 74-year-old female with currently 6/10 right-sided flank pain that is slowly becoming sharp in nature.  Unlikely bacterial etiology.  Symptoms likely due to disease progression.    Plan:  Monitor off antibiotics  Follow-up on urine culture  Supportive care   Pain regimen in place as this is likely due to progression of metastatic disease   Palliative care consult in place, plan for goals of care discussion   Patient and family have elected for rehab placement prior to initiation of hospice care. Discharge planning at this time - Medically stable for discharge.  Will likely have placement by tomorrow  Encounter for screening involving social determinants of health (SDoH)  Pt presented to [facility] yesterday and  pt/family state she was not tended to during night (minimal/no pain meds, left on bed pan, unsafe bed situation etc) and are refusing to return to [facility].   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 Treatments complicated by toxicities and infection.   Last treatment few weeks ago in April   MRI brain 5/13 showed stable enhancing lesions in the left occipital and parietal lobe with noted vasogenic edema.  Follows Dr. Castro for oncology, Dr Gamboa for Rad/onc, and follows palliative outpt   C/w decadron 3mg in the morning and 2mg in the evening   C/w Keppra 1000mg BID   C/w Bactrim -160mg MWF  Monitor mental status   Keppra levels supratherapeutic - no changes in regimen at this time    HTN (hypertension)  Norvasc 10 Mg  IV Labetalol as needed for SBP > 180  Anemia  Recent Labs     06/05/25  0448 06/06/25  0540   HGB 9.5* 11.0*     Anemia of chronic disease.  Hemoglobin baseline 9-10.  Continue to monitor.  Cancer associated pain  Continue with home Gabapentin 200 in am and 300 at bedtime, patient is on Narcan, methocarbamol, hydromorphone, oxycodone, MiraLAX, Tylenol  Insomnia  C/w Seroquel 25 mg qHS  Seroquel 12.5 prn for additional support  C/w Remeron 7.5 at bedtime   Optimize sleep hygiene  History of Pneumocystis jirovecii pneumonia  Continue Bactrim -160mg MWF  Hypothyroidism  Continue with home levothyroxine 50 mcg daily  History of stroke  R frontal subacute infarct, noted on imaging on 04/21/25  C/w aspirin & statin.    VTE Pharmacologic Prophylaxis: VTE Score: 7 High Risk (Score >/= 5) - Pharmacological DVT Prophylaxis Ordered: enoxaparin (Lovenox). Sequential Compression Devices Ordered.    Mobility:   Basic Mobility Inpatient Raw Score: 10  -HL Goal: 4: Move to chair/commode  JH-HLM Achieved: 3: Sit at edge of bed  JH-HLM Goal  NOT achieved. Continue with multidisciplinary rounding and encourage appropriate mobility to improve upon -[facility] goals.    Patient Centered Rounds: I performed bedside rounds with nursing staff today.   Discussions with Specialists or Other Care Team Provider: Case management    Education and Discussions with Family / Patient: Updated  (daughter) via phone.    Current Length of Stay: 5 day(s)  Current Patient Status: Inpatient   Certification Statement: Medically cleared for discharge  Discharge Plan: Medically stable for discharge, pending rehab placement likely tomorrow    Code Status: Level 3 - DNAR and DNI    Subjective   Patient seen and examined at bedside, no acute events overnight.  She denies any pains or discomforts at this time. She is excited to leave the hospital soon.    Objective :  Temp:  [98.1 °F (36.7 °C)-98.2 °F (36.8 °C)] 98.1 °F (36.7 °C)  HR:  [86-92] 87  BP: (120-149)/(81-91) 127/83  Resp:  [17] 17  SpO2:  [90 %-93 %] 93 %  O2 Device: None (Room air)    Body mass index is 21.77 kg/m².     Input and Output Summary (last 24 hours):     Intake/Output Summary (Last 24 hours) at 6/6/2025 1440  Last data filed at 6/6/2025 0819  Gross per 24 hour   Intake 670 ml   Output --   Net 670 ml       Physical Exam  Constitutional:       General: She is not in acute distress.     Appearance: She is ill-appearing.   HENT:      Mouth/Throat:      Mouth: Mucous membranes are moist.      Pharynx: Oropharynx is clear.     Cardiovascular:      Rate and Rhythm: Normal rate and regular rhythm.      Pulses: Normal pulses.      Heart sounds: Normal heart sounds. No murmur heard.  Pulmonary:      Effort: Pulmonary effort is normal. No respiratory distress.      Breath sounds: Normal breath sounds. No wheezing, rhonchi or rales.   Abdominal:      General: There is no distension.      Palpations: Abdomen is soft.      Tenderness: There is no abdominal tenderness. There is no guarding or rebound.      Musculoskeletal:      Right lower leg: No edema.      Left lower leg: No edema.     Skin:     General: Skin is warm and dry.     Neurological:      Mental Status: She is alert and oriented to person, place, and time.      Motor: Weakness present.      Comments: Stable 0/5 strength left upper and lower extremities with sensory deficit    Psychiatric:         Mood and Affect: Mood normal.         Behavior: Behavior normal.           Lines/Drains:              Lab Results: I have reviewed the following results:   Results from last 7 days   Lab Units 06/06/25  0540   WBC Thousand/uL 14.05*   HEMOGLOBIN g/dL 11.0*   HEMATOCRIT % 35.3   PLATELETS Thousands/uL 273   BANDS PCT % 1   LYMPHO PCT % 3*   MONO PCT % 3*   EOS PCT % 0     Results from last 7 days   Lab Units 06/06/25  0540 06/01/25  0557 05/31/25  1305   SODIUM mmol/L 137   < > 136   POTASSIUM mmol/L 5.0   < > 4.3   CHLORIDE mmol/L 103   < > 104   CO2 mmol/L 27   < > 23   BUN mg/dL 31*   < > 29*   CREATININE mg/dL 0.73   < > 0.84   ANION GAP mmol/L 7   < > 9   CALCIUM mg/dL 9.2   < > 9.4   ALBUMIN g/dL  --   --  3.5   TOTAL BILIRUBIN mg/dL  --   --  0.19*   ALK PHOS U/L  --   --  52   ALT U/L  --   --  12   AST U/L  --   --  9*   GLUCOSE RANDOM mg/dL 100   < > 123    < > = values in this interval not displayed.                 Results from last 7 days   Lab Units 05/31/25  1305   PROCALCITONIN ng/ml 0.16       Recent Cultures (last 7 days):               Last 24 Hours Medication List:     Current Facility-Administered Medications:     acetaminophen (TYLENOL) tablet 975 mg, Q8H KRISS    aluminum-magnesium hydroxide-simethicone (MAALOX) oral suspension 30 mL, Q4H PRN    amLODIPine (NORVASC) tablet 10 mg, Daily    aspirin chewable tablet 81 mg, Daily    atorvastatin (LIPITOR) tablet 40 mg, Daily With Dinner    bisacodyl (DULCOLAX) rectal suppository 10 mg, Daily PRN    cyanocobalamin (VITAMIN B-12) tablet 1,000 mcg, Daily    dexamethasone (DECADRON) tablet 2 mg,  Daily **AND** dexamethasone (DECADRON) tablet 3 mg, Daily    enoxaparin (LOVENOX) subcutaneous injection 40 mg, Daily    gabapentin (NEURONTIN) capsule 200 mg, Daily **AND** gabapentin (NEURONTIN) capsule 300 mg, HS    HYDROmorphone (DILAUDID) tablet 2 mg, Q4H PRN    HYDROmorphone (DILAUDID) tablet 4 mg, Q4H PRN    HYDROmorphone (DILAUDID) tablet 4 mg, HS    HYDROmorphone HCl (DILAUDID) injection 0.2 mg, Q3H PRN    labetalol (NORMODYNE) injection 10 mg, Q6H PRN    levETIRAcetam (KEPPRA) tablet 1,000 mg, BID    levothyroxine tablet 50 mcg, Early Morning    lidocaine (LIDODERM) 5 % patch 1 patch, Daily    LORazepam (ATIVAN) injection 2 mg, Q8H PRN    methocarbamol (ROBAXIN) tablet 250 mg, Q6H PRN    mirtazapine (REMERON) tablet 7.5 mg, HS    naloxone (NARCAN) intranasal 2 mg, Daily PRN    pantoprazole (PROTONIX) EC tablet 40 mg, Daily Before Breakfast    polyethylene glycol (MIRALAX) packet 17 g, Daily    QUEtiapine (SEROquel) tablet 12.5 mg, Daily PRN    QUEtiapine (SEROquel) tablet 25 mg, HS    senna (SENOKOT) tablet 17.2 mg, Daily    sulfamethoxazole-trimethoprim (BACTRIM DS) 800-160 mg per tablet 1 tablet, Once per day on Monday Wednesday Friday    Administrative Statements   Today, Patient Was Seen By: Greg Isaac Jr, DO      **Please Note: This note may have been constructed using a voice recognition system.**

## 2025-06-06 NOTE — ASSESSMENT & PLAN NOTE
Progressively worsening 9/10 dull ache right sided flank pain radiating to her abdomen and behind her leg that started yesterday.  Past three days her urine changed from clear to brown intermittently.     ED course-received 0.7 Dilaudid, Toradol 30 Mg, Zofran, 500 cc NS bolus    VS: on presentation hypertensive 204/101, last BP around for 165/88, satting 93% on 2 L (baseline RA)  Labs: WBC elevated at 16 but seems like this is her baseline in setting of steroid use, hemoglobin stable at 10.9, CMP pretty unremarkable, lipase and Pro-Du WNL  CT abd/pelv:  indicated progression of disease---diffuse metastatic disease, including enlargement of the right renal mass. There is possible invasion of the right renal vein. new mild right hydroureteronephrosis, with slightly increased density within the right ureterovesicular junction which may be blood vs focal lesion.  UA: trace leukocytes and large occult blood, 20-30 RBC, 4-10 WBC with occasional bacteria    Assessment: This is a 74-year-old female with currently 6/10 right-sided flank pain that is slowly becoming sharp in nature.  Unlikely bacterial etiology.  Symptoms likely due to disease progression.    Plan:  Monitor off antibiotics  Follow-up on urine culture  Supportive care   Pain regimen in place as this is likely due to progression of metastatic disease   Palliative care consult in place, plan for goals of care discussion   Patient and family have elected for rehab placement prior to initiation of hospice care. Discharge planning at this time - Medically stable for discharge.  Will likely have placement by tomorrow

## 2025-06-06 NOTE — CASE MANAGEMENT
Case Management Discharge Planning Note    Patient name Ayla Nye  Location W /W -01 MRN [id number]  : 1950 Date 2025       Current Admission Date: 2025  Current Admission Diagnosis:Flank pain   Patient Active Problem List    Diagnosis Date Noted    Flank pain 2025    Hematuria 2025    At risk for venous thromboembolism (VTE) 2025    Fall 05/15/2025    Impaired mobility and ADLs 05/15/2025    Vasogenic brain edema (HCC) 2025    Abnormal CT of the chest 2025    Stroke-like symptoms 2025    History of stroke 2025    NSVT (nonsustained ventricular tachycardia) (HCC) 2025    Left-sided weakness 2025    Right loin pain 2025    Complication of chemotherapy/immunotherapy 02/15/2025    GERD (gastroesophageal reflux disease) 2025    Epistaxis 2025    History of Pneumocystis jirovecii pneumonia 2025    IgG deficiency (HCC) 2025    Right kidney mass 2025    History of pulmonary embolism 2024    Dyspnea on exertion 2024    Imbalance 2024    Hyponatremia 2024    Leukocytosis 10/27/2024    Anemia 10/25/2024    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    Palliative care by specialist 2024    Cancer associated pain 2024    Encounter for screening involving social determinants of health (SDoH) 2024    Right hip pain 09/10/2024    Altered mental status 2024    Hypothyroidism 2024    Abnormal imaging of central nervous system 2024    Acute metabolic encephalopathy 2024    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) 2024    Brain mass 2024    Metastatic cancer to brain (HCC) 2024    Dehydration 2024    Moderate protein-calorie malnutrition (HCC) 2024    Bilateral leg pain 2024    Insomnia 2024    Metastatic adenocarcinoma (HCC) 2024    Age-related osteoporosis  without current pathological fracture 11/13/2023    Encounter for monitoring of hydroxyurea therapy 05/03/2023    JAK2 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 05/03/2023    HTN (hypertension) 08/10/2022    Mixed hyperlipidemia 08/10/2022    Essential thrombocytosis (HCC) 07/02/2020    TB lung, 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    Psoriatic arthritis (HCC) 09/10/2019      LOS (days): 5  Geometric Mean LOS (GMLOS) (days): 5.3  Days to GMLOS:0.3     OBJECTIVE:  Risk of Unplanned Readmission Score: 82.03         Current admission status: Inpatient   Preferred Pharmacy:   [id number] - [place] [facility] [place] - [address] [facility]  [address] [place]  [facility] [place] [address]  Phone: [phone] Fax: [phone]    [facility] [place] - [address]  [address]  [place] [address]  Phone: [phone] Fax: [phone]    Primary Care Provider: Rafy Angelo MD    Primary Insurance: MEDICARE  Secondary [facility]    DISCHARGE DETAILS:    Discharge planning discussed with:: daughter  Freedom of Choice: Yes  Comments - Freedom of Choice: Bed is available at [facility] for pt to go to rehab tomorrow.  Daughters are aware of DC tomorrow.  Transport is needed for DC.  CM contacted family/caregiver?: Yes  Were Treatment Team discharge recommendations reviewed with patient/caregiver?: Yes  Did patient/caregiver verbalize understanding of patient care needs?: Yes  Were patient/caregiver advised of the risks associated with not following Treatment Team discharge recommendations?: Yes    Contacts  Patient Contacts: Juliana  Relationship to Patient:: Family  Contact Method: Other (Comment) (email)  Reason/Outcome: Discharge Planning, Referral, Emergency Contact, Continuity of Care    Requested Home Health Care         Is the patient interested in C at discharge?: No    DME Referral Provided  Referral made for DME?: No    Other Referral/Resources/Interventions Provided:  Interventions: Short Term Rehab,  Transportation  Referral Comments: Request has been made for BLS for 1130 tomorrow.  CM to follow up tomorrow with [facility] to confirm time.  All parties involved have been informed request has been made for 1130 tomorrow.    Would you like to participate in our [facility] service program?  : No - Declined    Treatment Team Recommendation: Short Term Rehab  Discharge Destination Plan:: Short Term Rehab  Transport at Discharge : BLS Ambulance           ETA of Transport (Date): 06/07/25  ETA of Transport (Time): 1130         IMM Given (Date):: 06/06/25  IMM Given to:: Family  Family notified:: daughter   IMM reviewed with Juliana Andrews agrees with discharge determination.      Accepting Facility Name, City & State : [facility]  Receiving Facility/Agency Phone Number: [phone]  Facility/Agency Fax Number: [phone]

## 2025-06-07 ENCOUNTER — TELEPHONE (OUTPATIENT)
Dept: OTHER | Facility: OTHER | Age: 75
End: 2025-06-07

## 2025-06-07 VITALS
DIASTOLIC BLOOD PRESSURE: 81 MMHG | BODY MASS INDEX: 21.91 KG/M2 | HEART RATE: 87 BPM | TEMPERATURE: 97.7 F | SYSTOLIC BLOOD PRESSURE: 118 MMHG | WEIGHT: 119.05 LBS | OXYGEN SATURATION: 90 % | HEIGHT: 62 IN | RESPIRATION RATE: 16 BRPM

## 2025-06-07 DIAGNOSIS — C79.9 METASTATIC ADENOCARCINOMA (HCC): Primary | ICD-10-CM

## 2025-06-07 LAB
ANION GAP SERPL CALCULATED.3IONS-SCNC: 8 MMOL/L (ref 4–13)
ANISOCYTOSIS BLD QL SMEAR: PRESENT
BASOPHILS # BLD MANUAL: 0 THOUSAND/UL (ref 0–0.1)
BASOPHILS NFR MAR MANUAL: 0 % (ref 0–1)
BUN SERPL-MCNC: 31 MG/DL (ref 5–25)
CALCIUM SERPL-MCNC: 9.2 MG/DL (ref 8.4–10.2)
CHLORIDE SERPL-SCNC: 103 MMOL/L (ref 96–108)
CO2 SERPL-SCNC: 24 MMOL/L (ref 21–32)
CREAT SERPL-MCNC: 0.77 MG/DL (ref 0.6–1.3)
EOSINOPHIL # BLD MANUAL: 0 THOUSAND/UL (ref 0–0.4)
EOSINOPHIL NFR BLD MANUAL: 0 % (ref 0–6)
ERYTHROCYTE [DISTWIDTH] IN BLOOD BY AUTOMATED COUNT: 16.5 % (ref 11.6–15.1)
GFR SERPL CREATININE-BSD FRML MDRD: 76 ML/MIN/1.73SQ M
GLUCOSE SERPL-MCNC: 104 MG/DL (ref 65–140)
HCT VFR BLD AUTO: 33.3 % (ref 34.8–46.1)
HGB BLD-MCNC: 10.6 G/DL (ref 11.5–15.4)
LYMPHOCYTES # BLD AUTO: 0.76 THOUSAND/UL (ref 0.6–4.47)
LYMPHOCYTES # BLD AUTO: 5 % (ref 14–44)
MCH RBC QN AUTO: 28.4 PG (ref 26.8–34.3)
MCHC RBC AUTO-ENTMCNC: 31.8 G/DL (ref 31.4–37.4)
MCV RBC AUTO: 89 FL (ref 82–98)
METAMYELOCYTE ABSOLUTE CT: 0.15 THOUSAND/UL (ref 0–0.1)
METAMYELOCYTES NFR BLD MANUAL: 1 % (ref 0–1)
MONOCYTES # BLD AUTO: 0.61 THOUSAND/UL (ref 0–1.22)
MONOCYTES NFR BLD: 4 % (ref 4–12)
MYELOCYTE ABSOLUTE CT: 0.61 THOUSAND/UL (ref 0–0.1)
MYELOCYTES NFR BLD MANUAL: 4 % (ref 0–1)
NEUTROPHILS # BLD MANUAL: 13.11 THOUSAND/UL (ref 1.85–7.62)
NEUTS BAND NFR BLD MANUAL: 3 % (ref 0–8)
NEUTS SEG NFR BLD AUTO: 83 % (ref 43–75)
PLATELET # BLD AUTO: 246 THOUSANDS/UL (ref 149–390)
PLATELET BLD QL SMEAR: ADEQUATE
PMV BLD AUTO: 9.3 FL (ref 8.9–12.7)
POTASSIUM SERPL-SCNC: 4.8 MMOL/L (ref 3.5–5.3)
RBC # BLD AUTO: 3.73 MILLION/UL (ref 3.81–5.12)
RBC MORPH BLD: PRESENT
SODIUM SERPL-SCNC: 135 MMOL/L (ref 135–147)
WBC # BLD AUTO: 15.24 THOUSAND/UL (ref 4.31–10.16)

## 2025-06-07 PROCEDURE — 99239 HOSP IP/OBS DSCHRG MGMT >30: CPT | Performed by: HOSPITALIST

## 2025-06-07 PROCEDURE — 85027 COMPLETE CBC AUTOMATED: CPT | Performed by: HOSPITALIST

## 2025-06-07 PROCEDURE — 80048 BASIC METABOLIC PNL TOTAL CA: CPT | Performed by: HOSPITALIST

## 2025-06-07 PROCEDURE — 85007 BL SMEAR W/DIFF WBC COUNT: CPT | Performed by: HOSPITALIST

## 2025-06-07 RX ORDER — HYDROMORPHONE HYDROCHLORIDE 2 MG/1
4 TABLET ORAL EVERY 4 HOURS PRN
Qty: 4 TABLET | Refills: 0 | Status: SHIPPED | OUTPATIENT
Start: 2025-06-07

## 2025-06-07 RX ORDER — HYDROMORPHONE HYDROCHLORIDE 2 MG/1
4 TABLET ORAL EVERY 4 HOURS PRN
COMMUNITY
End: 2025-06-07 | Stop reason: SDUPTHER

## 2025-06-07 RX ADMIN — CYANOCOBALAMIN TAB 500 MCG 1000 MCG: 500 TAB at 08:06

## 2025-06-07 RX ADMIN — HYDROMORPHONE HYDROCHLORIDE 2 MG: 2 TABLET ORAL at 10:17

## 2025-06-07 RX ADMIN — AMLODIPINE BESYLATE 10 MG: 10 TABLET ORAL at 08:06

## 2025-06-07 RX ADMIN — LEVETIRACETAM 1000 MG: 500 TABLET, FILM COATED ORAL at 08:06

## 2025-06-07 RX ADMIN — ACETAMINOPHEN 975 MG: 325 TABLET ORAL at 05:08

## 2025-06-07 RX ADMIN — LEVOTHYROXINE SODIUM 50 MCG: 0.05 TABLET ORAL at 05:08

## 2025-06-07 RX ADMIN — ASPIRIN 81 MG: 81 TABLET, CHEWABLE ORAL at 08:06

## 2025-06-07 RX ADMIN — LIDOCAINE 1 PATCH: 700 PATCH TOPICAL at 08:07

## 2025-06-07 RX ADMIN — DEXAMETHASONE 3 MG: 2 TABLET ORAL at 08:06

## 2025-06-07 RX ADMIN — PANTOPRAZOLE SODIUM 40 MG: 40 TABLET, DELAYED RELEASE ORAL at 05:08

## 2025-06-07 RX ADMIN — SENNOSIDES 17.2 MG: 8.6 TABLET, FILM COATED ORAL at 08:06

## 2025-06-07 RX ADMIN — ENOXAPARIN SODIUM 40 MG: 40 INJECTION SUBCUTANEOUS at 08:05

## 2025-06-07 RX ADMIN — GABAPENTIN 200 MG: 100 CAPSULE ORAL at 08:06

## 2025-06-07 NOTE — TELEPHONE ENCOUNTER
"Malaika from [facility] stated, \" I am calling to review orders.\"     Paged on call VIA EPIC SC.  "

## 2025-06-07 NOTE — ASSESSMENT & PLAN NOTE
Progressively worsening 9/10 dull ache right sided flank pain radiating to her abdomen and behind her leg that started yesterday.  Past three days her urine changed from clear to brown intermittently.     ED course-received 0.7 Dilaudid, Toradol 30 Mg, Zofran, 500 cc NS bolus    VS: on presentation hypertensive 204/101, last BP around for 165/88, satting 93% on 2 L (baseline RA)  Labs: WBC elevated at 16 but seems like this is her baseline in setting of steroid use, hemoglobin stable at 10.9, CMP pretty unremarkable, lipase and Pro-Du WNL  CT abd/pelv:  indicated progression of disease---diffuse metastatic disease, including enlargement of the right renal mass. There is possible invasion of the right renal vein. new mild right hydroureteronephrosis, with slightly increased density within the right ureterovesicular junction which may be blood vs focal lesion.  UA: trace leukocytes and large occult blood, 20-30 RBC, 4-10 WBC with occasional bacteria    Assessment: This is a 74-year-old female with currently 6/10 right-sided flank pain that is slowly becoming sharp in nature.  Unlikely bacterial etiology.  Symptoms likely due to disease progression.   cute right flank pain secondary malignant neoplasm of right kidney and renal pelvis a/e/b imaging documenting diffuse metastatic disease, including enlargement of the right renal mass. There is possible invasion of the right renal vein treated with oncology consultation and palliative care for pain managment.      Plan:  Monitor off antibiotics  Follow-up on urine culture  Supportive care   Pain regimen in place as this is likely due to progression of metastatic disease   Palliative care consult in place, plan for goals of care discussion   Patient and family have elected for rehab placement prior to initiation of hospice care. Discharge planning at this time - Medically stable for discharge.
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
## 2025-06-07 NOTE — PLAN OF CARE
Problem: PAIN - ADULT  Goal: Verbalizes/displays adequate comfort level or baseline comfort level  Description: Interventions:  - Encourage patient to monitor pain and request assistance  - Assess pain using appropriate pain scale  - Administer analgesics as ordered based on type and severity of pain and evaluate response  - Implement non-pharmacological measures as appropriate and evaluate response  - Consider cultural and social influences on pain and pain management  - Notify physician/advanced practitioner if interventions unsuccessful or patient reports new pain  - Educate patient/family on pain management process including their role and importance of  reporting pain   - Provide non-pharmacologic/complimentary pain relief interventions  Outcome: Progressing     Problem: INFECTION - ADULT  Goal: Absence or prevention of progression during hospitalization  Description: INTERVENTIONS:  - Assess and monitor for signs and symptoms of infection  - Monitor lab/diagnostic results  - Monitor all insertion sites, i.e. indwelling lines, tubes, and drains  - Monitor endotracheal if appropriate and nasal secretions for changes in amount and color  - [place] appropriate cooling/warming therapies per order  - Administer medications as ordered  - Instruct and encourage patient and family to use good hand hygiene technique  - Identify and instruct in appropriate isolation precautions for identified infection/condition  Outcome: Progressing  Goal: Absence of fever/infection during neutropenic period  Description: INTERVENTIONS:  - Monitor WBC  - Perform strict hand hygiene  - Limit to healthy visitors only  - No plants, dried, fresh or silk flowers with patterson in patient room  Outcome: Progressing     Problem: SAFETY ADULT  Goal: Patient will remain free of falls  Description: INTERVENTIONS:  - Educate patient/family on patient safety including physical limitations  - Instruct patient to call for assistance with activity   -  Consider consulting OT/PT to assist with strengthening/mobility based on AM PAC & JH-HLM score  - Consult OT/PT to assist with strengthening/mobility   - Keep Call bell within reach  - Keep bed low and locked with side rails adjusted as appropriate  - Keep care items and personal belongings within reach  - Initiate and maintain comfort rounds  - Make Fall Risk Sign visible to staff  - Offer Toileting every  Hours, in advance of need  - Initiate/Maintain alarm  - Obtain necessary fall risk management equipment:   - Apply yellow socks and bracelet for high fall risk patients  - Consider moving patient to room near nurses station  Outcome: Progressing  Goal: Maintain or return to baseline ADL function  Description: INTERVENTIONS:  -  Assess patient's ability to carry out ADLs; assess patient's baseline for ADL function and identify physical deficits which impact ability to perform ADLs (bathing, care of mouth/teeth, toileting, grooming, dressing, etc.)  - Assess/evaluate cause of self-care deficits   - Assess range of motion  - Assess patient's mobility; develop plan if impaired  - Assess patient's need for assistive devices and provide as appropriate  - Encourage maximum independence but intervene and supervise when necessary  - Involve family in performance of ADLs  - Assess for home care needs following discharge   - Consider OT consult to assist with ADL evaluation and planning for discharge  - Provide patient education as appropriate  - Monitor functional capacity and physical performance, use of AM PAC & JH-HLM   - Monitor gait, balance and fatigue with ambulation    Outcome: Progressing  Goal: Maintains/Returns to pre admission functional level  Description: INTERVENTIONS:  - Perform AM-PAC 6 Click Basic Mobility/ Daily Activity assessment daily.  - Set and communicate daily mobility goal to care team and patient/family/caregiver.   - Collaborate with rehabilitation services on mobility goals if consulted  -  Perform Range of Motion  times a day.  - Reposition patient every  hours.  - Dangle patient  times a day  - Stand patient  times a day  - Ambulate patient  times a day  - Out of bed to chair  times a day   - Out of bed for meals  times a day  - Out of bed for toileting  - Record patient progress and toleration of activity level   Outcome: Progressing     Problem: DISCHARGE PLANNING  Goal: Discharge to home or other facility with appropriate resources  Description: INTERVENTIONS:  - Identify barriers to discharge w/patient and caregiver  - Arrange for needed discharge resources and transportation as appropriate  - Identify discharge learning needs (meds, wound care, etc.)  - Arrange for interpretive services to assist at discharge as needed  - Refer to Case Management Department for coordinating discharge planning if the patient needs post-hospital services based on physician/advanced practitioner order or complex needs related to functional status, cognitive ability, or social support system  Outcome: Progressing     Problem: Knowledge Deficit  Goal: Patient/family/caregiver demonstrates understanding of disease process, treatment plan, medications, and discharge instructions  Description: Complete learning assessment and assess knowledge base.  Interventions:  - Provide teaching at level of understanding  - Provide teaching via preferred learning methods  Outcome: Progressing     Problem: Prexisting or High Potential for Compromised Skin Integrity  Goal: Skin integrity is maintained or improved  Description: INTERVENTIONS:  - Identify patients at risk for skin breakdown  - Assess and monitor skin integrity including under and around medical devices   - Assess and monitor nutrition and hydration status  - Monitor labs  - Assess for incontinence   - Turn and reposition patient  - Assist with mobility/ambulation  - Relieve pressure over sandro prominences   - Avoid friction and shearing  - Provide appropriate hygiene  as needed including keeping skin clean and dry  - Evaluate need for skin moisturizer/barrier cream  - Collaborate with interdisciplinary team  - Patient/family teaching  - Consider wound care consult    Assess:  - Review Benjamín scale daily  - Clean and moisturize skin every  - Inspect skin when repositioning, toileting, and assisting with ADLS  - Assess under medical devices such as  every   - Assess extremities for adequate circulation and sensation     Bed Management:  - Have minimal linens on bed & keep smooth, unwrinkled  - Change linens as needed when moist or perspiring  - Avoid sitting or lying in one position for more than  hours while in bed?Keep HOB at degrees   - Toileting:  - Offer bedside commode  - Assess for incontinence every   - Use incontinent care products after each incontinent episode such as     Activity:  - Mobilize patient  times a day  - Encourage activity and walks on unit  - Encourage or provide ROM exercises   - Turn and reposition patient every  Hours  - Use appropriate equipment to lift or move patient in bed  - Instruct/ Assist with weight shifting every  when out of bed in chair  - Consider limitation of chair time  hour intervals    Skin Care:  - Avoid use of baby powder, tape, friction and shearing, hot water or constrictive clothing  - Relieve pressure over bony prominences using   - Do not massage red bony areas    Next Steps:  - Teach patient strategies to minimize risks such as   - Consider consults to  interdisciplinary teams such as   Outcome: Progressing

## 2025-06-07 NOTE — ASSESSMENT & PLAN NOTE
Recent Labs     06/05/25  0448 06/06/25  0540 06/07/25  0356   HGB 9.5* 11.0* 10.6*     Anemia of chronic disease.  Hemoglobin baseline 9-10.  Continue to monitor.

## 2025-06-07 NOTE — ASSESSMENT & PLAN NOTE
Diagnosed in 07/2024, s/p radiation in 08/2024 and 01/2025. Treatments complicated by toxicities and infection.   Last treatment few weeks ago in April   MRI brain 5/13 showed stable enhancing lesions in the left occipital and parietal lobe with noted vasogenic edema.  Follows Dr. Castro for oncology, Dr Gamboa for Rad/onc, and follows palliative outpt   C/w decadron 3mg in the morning and 2mg in the evening   C/w Keppra 1000mg BID   C/w Bactrim -160mg MWF  Monitor mental status   Keppra levels supratherapeutic - no changes in regimen at this time  Ongoing cerebral edema POA due to metastasis to brain with enhancing lesions a/e/b MRI of brain documenting evidence of vasogenic edema with treatment during this hospitalization including Decadron, Keppra, monitor neuro/mental status and oncology consult for symptom management.         Findings: Currently cerebral edema is documented as historical diagnosis based on imaging documentation.  If cerebral edema is chronic due to disease progression and is being treated during this hospitalization due to chronicity and ongoing need for medical management then it must be documented as ongoing diagnosis.

## 2025-06-07 NOTE — DISCHARGE INSTR - AVS FIRST PAGE
Dear Ayla Nye,     It was our pleasure to care for you here at [facility].  It is our hope that we were always able to exceed the expected standards for your care during your stay.  You were hospitalized due to right-sided flank pain.  You were cared for on the [address] floor by Bro Howard MD under the service of Yamilex Licona MD with the [facility] who covers for your primary care physician (PCP), Rafy Angelo MD, while you were hospitalized.  If you have any questions or concerns related to this hospitalization, you may contact us at .  For follow up as well as any medication refills, we recommend that you follow up with your primary care physician.  A registered nurse will reach out to you by phone within a few days after your discharge to answer any additional questions that you may have after going home.  However, at this time we provide for you here, the most important instructions / recommendations at discharge:     Notable Medication Adjustments -   Please start taking hydromorphone 2-4 mg oral tablet every 4 hours as needed for severe/moderate pain up to 10 days.  Please start taking Dilaudid 4 mg daily at bedtime.  If your pain is not controlled on pain medication, please talk to your outpatient PCP/hospice care specialist/palliative care specialist for management of pain.  Testing Required after Discharge -   None from hospital medicine standpoint.  Your outpatient PCP/palliative care/hospice specialist/heme-onc specialist may order additional test, if required.  Important follow up information -   Please follow with your outpatient PCP within 1 week of discharge.  Please follow-up with your outpatient palliative care specialist within 1 week of discharge.  Please follow-up with your outpatient hospice specialist within 1 week of discharge.  Please follow with your outpatient hematology-oncology specialist, if you  want to pursue the treatment for the cancer.  Other Instructions -   Please be compliant with your medications.  If new symptoms happen or current symptoms worsen, call 911 or come to ED.  Please review this entire after visit summary as additional general instructions including medication list, appointments, activity, diet, any pertinent wound care, and other additional recommendations from your care team that may be provided for you.      Sincerely,     Bro Howard MD

## 2025-06-07 NOTE — CASE MANAGEMENT
Case Management Progress Note    Patient name Ayla Nye  Location W /W -01 MRN [id number]  : 1950 Date 2025       LOS (days): 6  Geometric Mean LOS (GMLOS) (days): 5.3  Days to GMLOS:-0.5        OBJECTIVE:        Current admission status: Inpatient  Preferred Pharmacy:   [id number] - [place], [place] - [address] [place] [id number] [place]  [facility] [address]  Phone: [phone] Fax: [phone]    [facility] [place] - [address]  [address] [place] [address]  Phone: [phone] Fax: [phone]    Primary Care Provider: Rafy Angelo MD    Primary Insurance: MEDICARE  Secondary Insurance: [employer]    PROGRESS NOTE:      Transport confirmed for 11:30. Provider and facility updated.

## 2025-06-09 NOTE — OCCUPATIONAL THERAPY NOTE
Pt is a 75yo female diagnosed with lung cancer in 07/2024, Hx of radiation in 08/2024 and 01/2025. PMH of lung adenocarcinoma with brain metastasis, hx of PE, prior stroke, HTN, hypothyroidism, and HLD. Pt originally presented to [facility] on 5/12/25 due to a mechanical fall from ongoing left sided weakness, leading to head strike.  Patient had recently had a stroke with L sided weakness about 2 weeks prior and had been placed on steroids to assist with vasogenic edema. Pt's cancer treatments were placed on hold due to multiple admission/toxicities/PCP pneumonia/PE, with plans to start chemotherapy again at oncology f/u after discharge from [facility]. MRI brain showed stable enhancing lesions within the L superior occipital lobe and L parietal lobe with surrounding vasogenic edema, stable foci of restricted diffusion within the R posterior frontal lobe most likely sequela of subacute ischemia.Pt lived at home alone, but does have a HHA for five hours/day for 6 days a week. HHA was not physically assisting with selfcare routines, more IADLs and companionship. Pt has 2 daughters in the area that also come to help her at times but both work full time and have families to care for themselves. During [facility] stay Pt demonstrated increased weakness and fatigue related to the intensity of therapy daily, and reverted to need for A x 2 with transfers. A family meeting was held with Pt and her two daughters to discuss long term plans and medical needs. Pt discharging from [facility] to subacute to continue less intensive therapy while family is able to make arrangements for 24hr assistance/ long term placement based on Pt's physical and functional needs. Anticipate continued OT/PT at SNF as tolerated.

## 2025-06-10 ENCOUNTER — TELEPHONE (OUTPATIENT)
Age: 75
End: 2025-06-10

## 2025-06-10 ENCOUNTER — NURSING HOME VISIT (OUTPATIENT)
Dept: GERIATRICS | Facility: OTHER | Age: 75
End: 2025-06-10
Payer: MEDICARE

## 2025-06-10 VITALS
OXYGEN SATURATION: 97 % | DIASTOLIC BLOOD PRESSURE: 76 MMHG | TEMPERATURE: 98 F | BODY MASS INDEX: 21.25 KG/M2 | HEART RATE: 70 BPM | WEIGHT: 116.2 LBS | RESPIRATION RATE: 18 BRPM | SYSTOLIC BLOOD PRESSURE: 124 MMHG

## 2025-06-10 DIAGNOSIS — I10 PRIMARY HYPERTENSION: ICD-10-CM

## 2025-06-10 DIAGNOSIS — G47.09 OTHER INSOMNIA: ICD-10-CM

## 2025-06-10 DIAGNOSIS — C34.90 LUNG CANCER METASTATIC TO BRAIN (HCC): Primary | ICD-10-CM

## 2025-06-10 DIAGNOSIS — K21.9 GASTROESOPHAGEAL REFLUX DISEASE WITHOUT ESOPHAGITIS: ICD-10-CM

## 2025-06-10 DIAGNOSIS — E87.1 HYPONATREMIA: ICD-10-CM

## 2025-06-10 DIAGNOSIS — E03.8 OTHER SPECIFIED HYPOTHYROIDISM: ICD-10-CM

## 2025-06-10 DIAGNOSIS — D63.0 ANEMIA IN NEOPLASTIC DISEASE: ICD-10-CM

## 2025-06-10 DIAGNOSIS — G89.3 CANCER ASSOCIATED PAIN: ICD-10-CM

## 2025-06-10 DIAGNOSIS — C79.31 LUNG CANCER METASTATIC TO BRAIN (HCC): Primary | ICD-10-CM

## 2025-06-10 DIAGNOSIS — D72.828 OTHER ELEVATED WHITE BLOOD CELL (WBC) COUNT: ICD-10-CM

## 2025-06-10 DIAGNOSIS — R53.81 DEBILITY: ICD-10-CM

## 2025-06-10 DIAGNOSIS — E44.0 MODERATE PROTEIN-CALORIE MALNUTRITION (HCC): ICD-10-CM

## 2025-06-10 DIAGNOSIS — Z91.89 AT RISK FOR VENOUS THROMBOEMBOLISM (VTE): ICD-10-CM

## 2025-06-10 DIAGNOSIS — Z86.73 HISTORY OF STROKE: ICD-10-CM

## 2025-06-10 PROCEDURE — 99306 1ST NF CARE HIGH MDM 50: CPT | Performed by: FAMILY MEDICINE

## 2025-06-10 NOTE — TELEPHONE ENCOUNTER
New Patient      Insurance   Current Insurance?Medicare AAR   Insurance E-verified? yes     History   Reason for appointment/active symptoms?  referral for lung cancer painless hematuria right renal mass. Pt stated she is not having any pain discomfort no urinary issues  Pt does not see blood in urine      Has the patient had any previous Urologist(s)? over 3 yrs ago and does not remember name of DR      Was the patient seen in the ED? 5/31/25     Labs/Imaging(Including Out Of Network)? labs and imaging 5/2025 and 6/2025     Records Requested? no  Records Visible in EPIC? yes      Personal history of cancer? lung and brain carcinoma 6/2024     Appointment   Office location preference:  [facility]   ?   Appointment Details   Date:    Time:    Location:    Provider:    Does the appointment need further review? Please review to schedule pt appropriately due to nothing available within the time frame decision tree suggest     Pt call [phone]

## 2025-06-10 NOTE — PROGRESS NOTES
[facility]  [address]  [place] [address]  [facility]   SNF 31  History and Physical  NAME: Ayla Nye  AGE: 74 y.o. 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 Treatments complicated by toxicities and infection.   MRI brain 5/13 showed stable enhancing lesions in the left occipital and parietal lobe with noted vasogenic edema.  Follows Dr. Castro for oncology, Dr Gamboa for Rad/onc, and follows palliative out patient  Continue decadron 2 mg in the morning and 3 mg in the evening, Keppra 1000mg BID, Bactrim -160mg MWF  Monitor mental status    HTN (hypertension)  BP stable on SNF admission  Maintained on amlodipine 10 mg daily  Monitor BP. Risk of elevated BP due to steroids use.    GERD (gastroesophageal reflux disease)  Continue pantoprazole 40 mg in the setting of chronic steroid use    Hypothyroidism  TSH was low 0.4 on 5/12/2025  Continue home dose of levothyroxine 50 mcg daily  Will repeat TSH and adjust the dose of levothyroxine accordingly    History of stroke  With R MCA infarct and residual left hemiplegia. Xarelto was discontinued in April after new foci of hemorrhage within the left occipital metastasis  Continue aspirin, statin, and lovenox SQ daily. Will reach out to Oncology regarding Lovenox use.    Anemia  Anemia of chronic disease. Recent Hgb 10.6 (6/7/25).  Monitor CBC in a week    Leukocytosis  Likely steroid-induced leukocytosis. No signs of infection on vitals  and exam  Monitor clinically and trend CBC    Cancer associated pain  With right flank pain radiating to RLE, methocarbamol  Continue Dilaudid 4 mg daily and as needed, gabapentin, Robaxin, and lidocaine patch    Insomnia  Currently on mirtazapine 7.5 mg daily and Seroquel 25 mg daily.  Discussed on risk of sedation, dizziness and cognitive and psychomotor impairment patient wishes to continue Seroquel given effectiveness.  Will f/u and increase mirtazpine, then decrease Seroquel.    At risk for venous thromboembolism (VTE)  Continue Lovenox for now.    Hyponatremia  Likely SIADH in the setting of adenocarcinoma lung with metastasis to the brain  Recent sodium normal 135 (6/7/2025)  Monitor BMP.  Order placed    Moderate protein-calorie malnutrition (HCC)  Nutrition service following  Continue fortified applesauce and fortified smoothies 3 times daily    Debility  Multifactorial, age, comorbid medical conditions, and recent hospitalization for severe pain  Admitted to SNF for rehab  PT/OT consult-evaluate and treat  Supportive care, nutritional support, ADL support  Fall precautions    All medications and routine orders were reviewed and updated as needed.  Plan discussed with: Patient and her caregiver. Coordination of care with nursing, OT/PT, SW, dietician.  Chief Complaint   Seen for admission at Skilled Nursing Facility  History of Present Illness   74 y.o. female who is seen today, following hospitalization at [facility] from 5/31/2025 to 6/7/2025 for severe right flank pain radiating to the abdomen and behind the right leg.  CT abdomen pelvis revealed progression of the metastatic disease with enlargement of the right renal mass, possible invasion into the right renal vein.  Pain has been controlled with Dilaudid as needed.  Palliative service and hospice service were consulted. Patient and family decided to do STR with plan to transition to hospice eventually.  She was discharged to [facility]  SNF for rehab.  She is lying in bed, c/o pain in right back radiating to right leg. Admits poor appetite due to metallic taste in her mouth. Reports sleeping well since taking Seroquel and wants to continue. Denies dysuria. No BM in 4 days for which she was given laxatives and dulcolax supp this morning.    HISTORY:  Past Medical History[1]  Family History[2]  Social History[3]    Allergies:  Allergies[4]    Review of Systems   As per HPI, otherwise negative.  Medications and orders   All medications reviewed and updated in jail EMR.  Objective   /76   Pulse 70   Temp 98 °F (36.7 °C)   Resp 18   Wt 52.7 kg (116 lb 3.2 oz)   SpO2 97%   BMI 21.25 kg/m²   Physical Exam  Vitals and nursing note reviewed.   General: no acute distress.  HENT:      Head: Normocephalic.      Nose: Nose normal.      Mouth: Mucous membranes are moist.   Eyes:       Right eye: No discharge.         Left eye: No discharge.      Conjunctivae normal.   Cardiovascular:      Normal rate and regular rhythm. Soft systolic murmur heard.  Pulmonary:      Pulmonary effort is normal. No wheezing, rhonchi or rales.   Abdominal:      Bowel sounds are normal. There is no distension.      Abdomen is soft. There is no abdominal tenderness.   Musculoskeletal:      Right lower leg: No edema.      Left lower leg: No edema.   Skin:     General: Skin is warm. Scattered ecchymoses posterior b/l forearms and anterior LE  Neurological: alert.      Oriented to person, place, and time. Left hemiplegia. Cooperative, follow commands      Behavior: normal.     Pertinent Laboratory/Diagnostic Studies:   The following labs/studies were reviewed please see chart or hospital paperwork for details.    Labs & Imaging:  Lab Results   Component Value Date    WBC 15.24 (H) 06/07/2025    HGB 10.6 (L) 06/07/2025    HCT 33.3 (L) 06/07/2025    MCV 89 06/07/2025     06/07/2025     Lab Results   Component Value Date    SODIUM 135 06/07/2025    K 4.8 06/07/2025      2025    CO2 24 2025    AGAP 8 2025    BUN 31 (H) 2025    CREATININE 0.77 2025    GLUC 104 2025    GLUF 79 2025    CALCIUM 9.2 2025    AST 9 (L) 2025    ALT 12 2025    ALKPHOS 52 2025    TP 6.5 2025    TBILI 0.19 (L) 2025    EGFR 76 2025   I have spent a total time of 65 minutes in caring for this patient on the day of the visit/encounter including diagnostic results, prognosis, risks and benefits of tx options, instructions for management, patient and family education, counseling / coordination of care, documenting in the medical record, reviewing / ordering tests, medicine, procedures , obtaining and reviewing history, communicating with other healthcare professionals.  Leia Wilson MD         [1]   Past Medical History:  Diagnosis Date    DILLON (acute kidney injury) (HCC) 2025    Allergic     Allergic to neomiacin and cephalexin    Arthritis     Cancer (HCC)     lung cancer    Cancer (HCC)     mets to brain    Cancer (HCC)     perianal mass    Cataract 2023    Due to have durgery 2024    Disease of thyroid gland     Essential thrombocytosis (HCC)     controlled with medication    History of transfusion     Hyperlipidemia     Hypertension 2011    Mass in chest     Nodular goiter     Osteoporosis     Peripheral neuropathy     Pneumonia Oct 16, 2023    Also  2024    Psoriasis     SIRS (systemic inflammatory response syndrome) (HCC) 2024    Stroke (HCC)    [2]   Family History  Problem Relation Name Age of Onset    Stroke Mother Teresa     Depression Mother Teresa             Hypertension Mother Teresa     Hearing loss Mother Teresa     Tuberculosis Father      Cancer Brother Mauro         lung    Tuberculosis Brother Mauro     Coronary artery disease Brother Mauro     Hypertension Brother Mauro     Heart disease Brother Mauro     No Known Problems Daughter       No Known Problems Daughter      No Known Problems Maternal Grandmother      No Known Problems Maternal Grandfather      No Known Problems Paternal Grandmother      No Known Problems Paternal Grandfather      No Known Problems Maternal Aunt      No Known Problems Maternal Aunt      No Known Problems Maternal Aunt      No Known Problems Maternal Aunt      No Known Problems Paternal Aunt      Cancer Brother Tin         Throat cancer   [3]   Social History  Socioeconomic History    Marital status:    Tobacco Use    Smoking status: Former     Current packs/day: 1.00     Average packs/day: 1 pack/day for 59.4 years (59.4 ttl pk-yrs)     Types: Cigarettes     Start date: 1966     Passive exposure: Past    Smokeless tobacco: Never    Tobacco comments:     Quit 2010   Vaping Use    Vaping status: Never Used   Substance and Sexual Activity    Alcohol use: Not Currently     Alcohol/week: 5.0 standard drinks of alcohol     Types: 5 Glasses of wine per week    Drug use: Never    Sexual activity: Not Currently     Partners: Male     Birth control/protection: Post-menopausal     Social Drivers of Health     Financial Resource Strain: Low Risk  (8/14/2023)    Overall Financial Resource Strain (CARDIA)     Difficulty of Paying Living Expenses: Not hard at all   Food Insecurity: No Food Insecurity (5/31/2025)    Nursing - Inadequate Food Risk Classification     Worried About Running Out of Food in the Last Year: Never true     Ran Out of Food in the Last Year: Never true     Ran Out of Food in the Last Year: Never true   Transportation Needs: No Transportation Needs (5/31/2025)    Nursing - Transportation Risk Classification     Lack of Transportation: No   Intimate Partner Violence: Unknown (5/31/2025)    Nursing IPS     Physically Hurt by Someone: No     Hurt or Threatened by Someone: No   Housing Stability: Unknown (5/31/2025)    Nursing: Inadequate Housing Risk Classification     Unable to Pay for Housing in the Last  Year: No     Has Housin   [4]   Allergies  Allergen Reactions    Doxycycline Headache    Keflex [Cephalexin] Rash    Neomycin-Polymyxin-Dexameth Eye Swelling

## 2025-06-11 ENCOUNTER — PATIENT MESSAGE (OUTPATIENT)
Age: 75
End: 2025-06-11

## 2025-06-11 PROBLEM — R26.89 IMBALANCE: Status: RESOLVED | Noted: 2024-12-31 | Resolved: 2025-06-11

## 2025-06-11 PROBLEM — G93.89 BRAIN MASS: Status: RESOLVED | Noted: 2024-07-30 | Resolved: 2025-06-11

## 2025-06-11 PROBLEM — R53.81 DEBILITY: Status: ACTIVE | Noted: 2025-06-11

## 2025-06-11 PROBLEM — R41.82 ALTERED MENTAL STATUS: Status: RESOLVED | Noted: 2024-01-01 | Resolved: 2025-01-01

## 2025-06-11 PROBLEM — M25.551 RIGHT HIP PAIN: Status: RESOLVED | Noted: 2024-01-01 | Resolved: 2025-01-01

## 2025-06-11 PROBLEM — M79.604 BILATERAL LEG PAIN: Status: RESOLVED | Noted: 2024-04-09 | Resolved: 2025-06-11

## 2025-06-11 PROBLEM — R04.0 EPISTAXIS: Status: RESOLVED | Noted: 2025-01-01 | Resolved: 2025-01-01

## 2025-06-11 PROBLEM — M54.50: Status: RESOLVED | Noted: 2025-01-01 | Resolved: 2025-01-01

## 2025-06-11 PROBLEM — M79.605 BILATERAL LEG PAIN: Status: RESOLVED | Noted: 2024-04-09 | Resolved: 2025-06-11

## 2025-06-11 NOTE — TELEPHONE ENCOUNTER
Appt scheduled at [facility] due to availability at [facility] and to get patient seen asap.     Please call and confirm appt for tomorrow with patient.  Ideally should get records from previous doctors office

## 2025-06-12 NOTE — ASSESSMENT & PLAN NOTE
With right flank pain radiating to RLE, methocarbamol  Continue Dilaudid 4 mg daily and as needed, gabapentin, Robaxin, and lidocaine patch

## 2025-06-12 NOTE — ASSESSMENT & PLAN NOTE
With R MCA infarct and residual left hemiplegia. Xarelto was discontinued in April after new foci of hemorrhage within the left occipital metastasis  Continue aspirin, statin, and lovenox SQ daily. Will reach out to Oncology regarding Lovenox use.

## 2025-06-12 NOTE — TELEPHONE ENCOUNTER
Called the patient and left a VM and requested a call back at [phone] if she would like to reschedule the appointment that was cancelled today.    Date: 6/12/2025 Status: Can   Provider: Franklin Moore MD Department:  [facility] [place]       Auto Confirm Status: Wants to Cancel   Notes: enlargement of the right renal mass. There is possible invasion of the right renal vein.   Made On:  Changed:  Canceled: 6/11/2025 7:46 AM  6/11/2025 7:48 AM  6/11/2025 10:11 AM By:  By:  By: BUBBA VALDES [[id number]] (ES)  BUBBA VALDES [[id number]] (ES)  JAYLA, GENERIC [[email or web address]] ([facility] respo

## 2025-06-12 NOTE — ASSESSMENT & PLAN NOTE
Likely SIADH in the setting of adenocarcinoma lung with metastasis to the brain  Recent sodium normal 135 (6/7/2025)  Monitor BMP.  Order placed

## 2025-06-12 NOTE — ASSESSMENT & PLAN NOTE
Multifactorial, age, comorbid medical conditions, and recent hospitalization for severe pain  Admitted to SNF for rehab  PT/OT consult-evaluate and treat  Supportive care, nutritional support, ADL support  Fall precautions

## 2025-06-12 NOTE — ASSESSMENT & PLAN NOTE
Diagnosed in 07/2024, s/p radiation in 08/2024 and 01/2025. Treatments complicated by toxicities and infection.   MRI brain 5/13 showed stable enhancing lesions in the left occipital and parietal lobe with noted vasogenic edema.  Follows Dr. Castro for oncology, Dr Gamboa for Rad/onc, and follows palliative out patient  Continue decadron 2 mg in the morning and 3 mg in the evening, Keppra 1000mg BID, Bactrim -160mg MWF  Monitor mental status

## 2025-06-12 NOTE — ASSESSMENT & PLAN NOTE
TSH was low 0.4 on 5/12/2025  Continue home dose of levothyroxine 50 mcg daily  Will repeat TSH and adjust the dose of levothyroxine accordingly

## 2025-06-12 NOTE — ASSESSMENT & PLAN NOTE
BP stable on SNF admission  Maintained on amlodipine 10 mg daily  Monitor BP. Risk of elevated BP due to steroids use.

## 2025-06-12 NOTE — ASSESSMENT & PLAN NOTE
Likely steroid-induced leukocytosis. No signs of infection on vitals and exam  Monitor clinically and trend CBC

## 2025-06-12 NOTE — TELEPHONE ENCOUNTER
LVM letting her know that we saw that she canceled her apt for today and did not reschedule.  CB# was given in order for her to reschedule..

## 2025-06-12 NOTE — ASSESSMENT & PLAN NOTE
Currently on mirtazapine 7.5 mg daily and Seroquel 25 mg daily.  Discussed on risk of sedation, dizziness and cognitive and psychomotor impairment patient wishes to continue Seroquel given effectiveness.  Will f/u and increase mirtazpine, then decrease Seroquel.

## 2025-06-16 ENCOUNTER — NURSE TRIAGE (OUTPATIENT)
Dept: OTHER | Facility: OTHER | Age: 75
End: 2025-06-16

## 2025-06-16 ENCOUNTER — PATIENT MESSAGE (OUTPATIENT)
Age: 75
End: 2025-06-16

## 2025-06-16 ENCOUNTER — HOSPITAL ENCOUNTER (OUTPATIENT)
Dept: INFUSION CENTER | Facility: HOSPITAL | Age: 75
Discharge: HOME | End: 2025-06-16
Attending: INTERNAL MEDICINE

## 2025-06-16 DIAGNOSIS — C49.5: ICD-10-CM

## 2025-06-16 DIAGNOSIS — C79.31 METASTATIC CANCER TO BRAIN (HCC): ICD-10-CM

## 2025-06-16 DIAGNOSIS — Z51.5 PALLIATIVE CARE BY SPECIALIST: ICD-10-CM

## 2025-06-16 DIAGNOSIS — C79.9 METASTATIC ADENOCARCINOMA (HCC): ICD-10-CM

## 2025-06-16 RX ORDER — HYDROMORPHONE HYDROCHLORIDE 4 MG/1
4 TABLET ORAL
Qty: 14 TABLET | Refills: 0 | Status: SHIPPED | OUTPATIENT
Start: 2025-06-16 | End: 2025-06-17

## 2025-06-16 RX ORDER — HYDROMORPHONE HYDROCHLORIDE 2 MG/1
2 TABLET ORAL EVERY 4 HOURS PRN
Qty: 42 TABLET | Refills: 0 | Status: SHIPPED | OUTPATIENT
Start: 2025-06-16 | End: 2025-06-17 | Stop reason: ALTCHOICE

## 2025-06-16 RX ORDER — HYDROMORPHONE HYDROCHLORIDE 4 MG/1
4 TABLET ORAL EVERY 4 HOURS PRN
Qty: 42 TABLET | Refills: 0 | Status: SHIPPED | OUTPATIENT
Start: 2025-06-16 | End: 2025-06-17

## 2025-06-16 NOTE — TELEPHONE ENCOUNTER
"REASON FOR CONVERSATION: Pain    SYMPTOMS: Uncontrolled pain    OTHER HEALTH INFORMATION: History of lung cancer with mets to brain    PROTOCOL DISPOSITION: Go to ED Now    CARE ADVICE PROVIDED: ESC placed to on call provider, who agreed with ER disposition    PRACTICE FOLLOW-UP: ED eval pending follow up per office protocol      Reason for Disposition   SEVERE chest pain    Answer Assessment - Initial Assessment Questions  1. LOCATION: \"Where does it hurt?\"        Right side, chest/ribcage/abdomen, down to right leg     2. RADIATION: \"Does the pain go anywhere else?\" (e.g., into neck, jaw, arms, back)      Radiates around     3. ONSET: \"When did the chest pain begin?\" (Minutes, hours or days)       2-3 days ago    4. PATTERN: \"Does the pain come and go, or has it been constant since it started?\"  \"Does it get worse with exertion?\"       Constant     5. DURATION: \"How long does it last\" (e.g., seconds, minutes, hours)      Constant     6. SEVERITY: \"How bad is the pain?\"  (e.g., Scale 1-10; mild, moderate, or severe)      9/10     7. CARDIAC RISK FACTORS: \"Do you have any history of heart problems or risk factors for heart disease?\" (e.g., angina, prior heart attack; diabetes, high blood pressure, high cholesterol, smoker, or strong family history of heart disease)      N/A    8. PULMONARY RISK FACTORS: \"Do you have any history of lung disease?\"  (e.g., blood clots in lung, asthma, emphysema, birth control pills)      Lung cancer     10. OTHER SYMPTOMS: \"Do you have any other symptoms?\" (e.g., dizziness, nausea, vomiting, sweating, fever, difficulty breathing, cough)        Difficulty taking deep breaths    Protocols used: Chest Pain-Adult-AH    "

## 2025-06-16 NOTE — PATIENT COMMUNICATION
Spoke with daughter Juliana, dicussed medication.   I'm open to speaking with provider at facility for med rec for pain.

## 2025-06-17 ENCOUNTER — NURSING HOME VISIT (OUTPATIENT)
Dept: GERIATRICS | Facility: OTHER | Age: 75
End: 2025-06-17
Payer: MEDICARE

## 2025-06-17 DIAGNOSIS — E44.0 MODERATE PROTEIN-CALORIE MALNUTRITION (HCC): ICD-10-CM

## 2025-06-17 DIAGNOSIS — C34.90 LUNG CANCER METASTATIC TO BRAIN (HCC): ICD-10-CM

## 2025-06-17 DIAGNOSIS — G89.3 CANCER ASSOCIATED PAIN: ICD-10-CM

## 2025-06-17 DIAGNOSIS — Z51.5 PALLIATIVE CARE BY SPECIALIST: ICD-10-CM

## 2025-06-17 DIAGNOSIS — C79.31 LUNG CANCER METASTATIC TO BRAIN (HCC): ICD-10-CM

## 2025-06-17 DIAGNOSIS — C49.5: ICD-10-CM

## 2025-06-17 DIAGNOSIS — C79.31 METASTATIC CANCER TO BRAIN (HCC): Primary | ICD-10-CM

## 2025-06-17 DIAGNOSIS — C79.9 METASTATIC ADENOCARCINOMA (HCC): ICD-10-CM

## 2025-06-17 DIAGNOSIS — D63.0 ANEMIA IN NEOPLASTIC DISEASE: ICD-10-CM

## 2025-06-17 DIAGNOSIS — R53.81 PHYSICAL DECONDITIONING: ICD-10-CM

## 2025-06-17 DIAGNOSIS — I10 PRIMARY HYPERTENSION: ICD-10-CM

## 2025-06-17 DIAGNOSIS — D72.823 LEUKEMOID REACTION: ICD-10-CM

## 2025-06-17 PROCEDURE — 99309 SBSQ NF CARE MODERATE MDM 30: CPT | Performed by: NURSE PRACTITIONER

## 2025-06-17 RX ORDER — HYDROMORPHONE HYDROCHLORIDE 2 MG/1
6 TABLET ORAL DAILY
Qty: 3 TABLET | Refills: 0 | Status: SHIPPED | OUTPATIENT
Start: 2025-06-17 | End: 2025-06-17 | Stop reason: ALTCHOICE

## 2025-06-17 RX ORDER — METHOCARBAMOL 500 MG/1
250 TABLET, FILM COATED ORAL EVERY 8 HOURS
Status: SHIPPED
Start: 2025-06-17 | End: 2025-06-27

## 2025-06-17 RX ORDER — HYDROMORPHONE HYDROCHLORIDE 4 MG/1
4 TABLET ORAL EVERY 4 HOURS
Qty: 50 TABLET | Refills: 0 | Status: SHIPPED | OUTPATIENT
Start: 2025-06-17 | End: 2025-06-17 | Stop reason: ALTCHOICE

## 2025-06-17 RX ORDER — HYDROMORPHONE HYDROCHLORIDE 4 MG/1
4 TABLET ORAL EVERY 4 HOURS
Status: SHIPPED
Start: 2025-06-17 | End: 2025-06-19 | Stop reason: DRUGHIGH

## 2025-06-17 RX ORDER — ACETAMINOPHEN 500 MG
1000 TABLET ORAL EVERY 8 HOURS
Status: SHIPPED
Start: 2025-06-17 | End: 2025-06-27

## 2025-06-17 NOTE — ASSESSMENT & PLAN NOTE
"Patient reports having \"pain all over\" and had been requesting dilaudid every 4 hours PRN prior to order dropping off. Collaborated with palliative care CRNP regarding pain medication regimen to better control patient's pain  Continue gabapentin as ordered  Plan:  Since patient did not receive Dilaudid through the night, Dilaudid 6 mg x 1 dose ordered stat this morning  Ordered Dilaudid 4 mg every 4 hours with hold parameters, extra strength Tylenol 1000 mg every 8 hours, and methocarbamol 250 mg every 8 hours  Nursing to monitor for sedation and notify provider  Will continue to monitor and adjust medications if clinically indicated  "

## 2025-06-17 NOTE — TELEPHONE ENCOUNTER
Could you please take a look into this PT's chart.  I saw several things.    We called about an apt and she canceled VIA [employer].  Left 2 other messages with no return call.    She lives in [place].  Please advise what to do next considering what's in her chart.

## 2025-06-17 NOTE — ASSESSMENT & PLAN NOTE
Multifactorial secondary to metastatic CA and comorbidities.  With weight loss, loss of muscle and subcutaneous tissue  Diet was recently downgraded to dental soft  Continue nutritional supplements and speech therapist following  Dietitian following

## 2025-06-17 NOTE — ASSESSMENT & PLAN NOTE
Diagnosed in 07/2024, s/p radiation in 08/2024 and 01/2025. Treatments complicated by toxicities and infection  MRI brain 5/13 showed stable enhancing lesions in the left occipital and parietal lobe with noted vasogenic edema  Care managed by Dr. Castro for oncology, Dr. Gamboa for Rad/onc, and outpatient palliatice care  Currently on decadron 2 mg every morning and 3 mg in the evening, keppra 1000 mg BID, and bactrim DS every Monday/Wednesday/Friday

## 2025-06-17 NOTE — PROGRESS NOTES
Facility: [facility]  POS: 31  Progress Note    Chief Complaint/Reason for visit: STR follow-up visit  Code status: DNH/DNR  History of Present Illness: 74-year-old female seen for follow-up of acute and chronic medical conditions.  Patient is currently residing at [facility] for rehabilitation.  Received patient resting in bed with oxygen on via nasal cannula and she appeared in no distress.  Patient's daughters Juliana and Sue were present at bedside.  Collaborated with Lisy Morley, palliative care CRNP regarding pain medication recommendations in order to provide patient with scheduled pain medications instead of PRN dosing.  Discussed new pain medication regimen with patient and her daughters at bedside and they verbalized understanding. continue to monitor and evaluate for pain relief.  Patient and family were advised to make nursing staff aware if pain not controlled and they will notify provider.  See A/P for additional information.  Past Medical History: unchanged from history and physical  Past Medical History:   Diagnosis Date    DILLON (acute kidney injury) (HCC) 02/14/2025    Allergic 2022    Allergic to neomiacin and cephalexin    Arthritis     Cancer (HCC)     lung cancer    Cancer (HCC)     mets to brain    Cancer (HCC)     perianal mass    Cataract Nov. 2023    Due to have durgery June 2024    Disease of thyroid gland     Essential thrombocytosis (HCC)     controlled with medication    History of transfusion     Hyperlipidemia 2015    Hypertension 2011    Mass in chest     Nodular goiter     Osteoporosis     Peripheral neuropathy     Pneumonia Oct 16, 2023    Also  Feb 2024    Psoriasis     SIRS (systemic inflammatory response syndrome) (HCC) 06/22/2024    Stroke (HCC)      Family History: Unchanged from history and physical  Social History: Unchanged from history and physical  Review of systems: As per review of medical illness, all other systems reviewed and  "negative.  Medications: All medication and routine orders were reviewed and updated  Allergies: Reviewed and unchanged  Consults reviewed:PT, OT, Nutrition, and Other  Labs/Diagnostics (reviewed by this provider): Copy in Chart  Imaging Reviewed: None today  Physical Exam  Weight: 112.9 pounds on 6/11/2025 temp: 97.4          BP: 114/68     pulse: 86 resp: 16       O2 Sat: 96% on oxygen via nasal cannula at 3 to 4 L/M  Orientation:Person and Place     Physical Exam  Vitals and nursing note reviewed.   Constitutional:       General: She is not in acute distress.     Appearance: She is ill-appearing. She is not toxic-appearing or diaphoretic.   HENT:      Head: Normocephalic.      Nose: No congestion.     Cardiovascular:      Rate and Rhythm: Normal rate.   Pulmonary:      Effort: Pulmonary effort is normal. No respiratory distress.      Comments: Oxygen on via nasal cannula.  Abdominal:      General: There is no distension.      Palpations: Abdomen is soft.      Tenderness: There is no abdominal tenderness. There is no guarding.     Skin:     General: Skin is warm and dry.     Neurological:      Mental Status: She is alert. Mental status is at baseline.     Psychiatric:         Mood and Affect: Mood normal.         Behavior: Behavior normal.         Thought Content: Thought content normal.       Assessment/Plan:  74-year-old female with:    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)  Diagnosed in 07/2024, s/p radiation in 08/2024 and 01/2025. Treatments complicated by toxicities and infection  MRI brain 5/13 showed stable enhancing lesions in the left occipital and parietal lobe with noted vasogenic edema  Care managed by Dr. Castro for oncology, Dr. Gamboa for Rad/onc, and outpatient palliatice care  Currently on decadron 2 mg every morning and 3 mg in the evening, keppra 1000 mg BID, and bactrim DS every Monday/Wednesday/Friday    Cancer associated pain  Patient reports having \"pain all over\" and " had been requesting dilaudid every 4 hours PRN prior to order dropping off. Collaborated with palliative care CRNP regarding pain medication regimen to better control patient's pain  Continue gabapentin as ordered  Plan:  Since patient did not receive Dilaudid through the night, Dilaudid 6 mg x 1 dose ordered stat this morning  Ordered Dilaudid 4 mg every 4 hours with hold parameters, extra strength Tylenol 1000 mg every 8 hours, and methocarbamol 250 mg every 8 hours  Nursing to monitor for sedation and notify provider  Will continue to monitor and adjust medications if clinically indicated    HTN (hypertension)  With relative hypotension and norvasc was discontinued with improved BP today    Anemia  Anemia of chronic disease  Continue to monitor    Leukocytosis  Steroid-induced leukocytosis.  Patient is currently on scheduled dexamethasone    Physical deconditioning  Multifactorial  Patient with severe physical deconditioning  Continue supportive care at SNF for ADLs  Continue PT/OT/ST; goal to sit safely in wheelchair, positioning and tolerance with both OT and PT, and better pain management  Social service is assisting patient with discharge planning    Moderate protein-calorie malnutrition (HCC)  Multifactorial secondary to metastatic CA and comorbidities.  With weight loss, loss of muscle and subcutaneous tissue  Diet was recently downgraded to dental soft  Continue nutritional supplements and speech therapist following  Dietitian following    This note was completed in part utilizing [employer] direct voice recognition software.  Grammatical errors, random word insertion, spelling mistakes, and incomplete sentences may be an occasional consequence of the system secondary to software limitations, ambient noise and hardware issues.  At the time of dictation, efforts were made to edit, clarify and/or correct errors.  Please read the chart carefully and recognize, using context, where substitutions have  occurred.  If you have any questions or concerns about the context, text or information contained within the body of this dictation, please contact myself, the provider, for further clarification.    ZULEYMA Perez  6/17/20253:01 PM

## 2025-06-17 NOTE — ASSESSMENT & PLAN NOTE
Multifactorial  Patient with severe physical deconditioning  Continue supportive care at SNF for ADLs  Continue PT/OT/ST; goal to sit safely in wheelchair, positioning and tolerance with both OT and PT, and better pain management  Social service is assisting patient with discharge planning

## 2025-06-18 NOTE — TELEPHONE ENCOUNTER
I see that she has canceled via [employer] again.  I had to leave a voice mail again.  This time I asked her to please call to let us know if she does want to make an apt or if she plans on letting this go and NOT making an apt.  CB# was given for her to let us know either way so she is not forgotten in the system.

## 2025-06-19 DIAGNOSIS — G89.3 CANCER ASSOCIATED PAIN: Primary | ICD-10-CM

## 2025-06-19 DIAGNOSIS — C79.31 METASTATIC CANCER TO BRAIN (HCC): ICD-10-CM

## 2025-06-19 RX ORDER — HYDROMORPHONE HYDROCHLORIDE 2 MG/1
6 TABLET ORAL EVERY 4 HOURS
Qty: 80 TABLET | Refills: 0 | Status: SHIPPED | OUTPATIENT
Start: 2025-06-19 | End: 2025-06-25

## 2025-06-20 ENCOUNTER — NURSING HOME VISIT (OUTPATIENT)
Dept: GERIATRICS | Facility: OTHER | Age: 75
End: 2025-06-20
Payer: MEDICARE

## 2025-06-20 DIAGNOSIS — R53.81 PHYSICAL DECONDITIONING: ICD-10-CM

## 2025-06-20 DIAGNOSIS — G89.3 CANCER ASSOCIATED PAIN: ICD-10-CM

## 2025-06-20 DIAGNOSIS — C34.90 LUNG CANCER METASTATIC TO BRAIN (HCC): ICD-10-CM

## 2025-06-20 DIAGNOSIS — C79.31 LUNG CANCER METASTATIC TO BRAIN (HCC): ICD-10-CM

## 2025-06-20 DIAGNOSIS — C79.31 METASTATIC CANCER TO BRAIN (HCC): Primary | ICD-10-CM

## 2025-06-20 DIAGNOSIS — J96.01 ACUTE RESPIRATORY FAILURE WITH HYPOXIA (HCC): ICD-10-CM

## 2025-06-20 DIAGNOSIS — D64.9 ANEMIA, UNSPECIFIED TYPE: ICD-10-CM

## 2025-06-20 PROBLEM — J96.91 RESPIRATORY FAILURE WITH HYPOXIA (HCC): Status: ACTIVE | Noted: 2025-01-01

## 2025-06-20 PROCEDURE — 99309 SBSQ NF CARE MODERATE MDM 30: CPT | Performed by: NURSE PRACTITIONER

## 2025-06-20 NOTE — ASSESSMENT & PLAN NOTE
Multifactorial  Continue supportive care at SNF for ADLs  Continue PT/OT/ST; goal is to sit safely in wheelchair, positioning and tolerance with both OT and PT, and pain management  Social service is assisting patient with discharge planning

## 2025-06-20 NOTE — PROGRESS NOTES
Facility: [facility]  POS: 31  Progress Note    Chief Complaint/Reason for visit: STR follow-up visit  Code status: DNR/DNH  Patient has advanced directive/living will/POLST.  Palliative care following for ongoing goals of care discussions as situation evolves  History of Present Illness: 74-year-old female seen and examined for STR follow-up of acute and chronic medical conditions.  Patient is currently residing at [facility] for rehabilitation.  At time of examination, patient found resting in bed, and she appeared in no distress.  Patient responded verbally immediately to verbal stimuli.  She reports having no pain at time of exam.  Pain more controlled on higher dose of Dilaudid.  Patient's family were at bedside.  As per nursing report, Dilaudid dose was held at 2 AM and 6 AM as per parameter orders and patient stated that she did not have pain.  Patient and family made aware of hold parameters for Dilaudid as recommended by palliative care CRNP.  Patient has a dry scabbed area to right lateral calf and was evaluated by [facility] wound nurse yesterday; wound care orders in place.  Wound was examined this morning, scab remains intact, and no signs of infection noted; new Mepilex dressing applied.  See A/P for additional information.  Past Medical History: unchanged from history and physical  Past Medical History:  Diagnosis Date    DILLON (acute kidney injury) (HCC) 02/14/2025    Allergic 2022    Allergic to neomiacin and cephalexin    Arthritis     Cancer (HCC)     lung cancer    Cancer (HCC)     mets to brain    Cancer (HCC)     perianal mass    Cataract Nov. 2023    Due to have durgery June 2024    Disease of thyroid gland     Essential thrombocytosis (HCC)     controlled with medication    History of transfusion     Hyperlipidemia 2015    Hypertension 2011    Mass in chest     Nodular goiter     Osteoporosis     Peripheral neuropathy     Pneumonia Oct 16, 2023    Also  Feb 2024     Psoriasis     SIRS (systemic inflammatory response syndrome) (HCC) 06/22/2024    Stroke (HCC)      Family History: Unchanged from history and physical  Social History: Unchanged from history and physical  Review of systems: As per review of medical illness, all other systems reviewed and negative.  Medications: All medication and routine orders were reviewed and updated  Allergies: Reviewed and unchanged  Consults reviewed:PT, OT, and Other  Labs/Diagnostics (reviewed by this provider): Copy in Chart 6/17/2025 CBC, TSH, CMP reviewed; stable  Imaging Reviewed: None today  Physical Exam  Vital signs log reviewed at facility EMR; no acute findings  Orientation:Person and Place     Physical Exam  Vitals and nursing note reviewed.   Constitutional:       General: She is not in acute distress.     Appearance: She is ill-appearing. She is not toxic-appearing or diaphoretic.   HENT:      Head: Normocephalic.      Nose: No congestion.     Cardiovascular:      Rate and Rhythm: Normal rate and regular rhythm.   Pulmonary:      Effort: Pulmonary effort is normal. No respiratory distress.   Abdominal:      General: Bowel sounds are normal. There is no distension.      Palpations: Abdomen is soft.      Tenderness: There is no abdominal tenderness. There is no guarding.     Musculoskeletal:      Cervical back: Neck supple.      Right lower leg: No edema.      Left lower leg: No edema.      Comments: Moves all 4 extremities.     Skin:     General: Skin is warm and dry.      Capillary Refill: Capillary refill takes less than 2 seconds.      Coloration: Skin is pale.      Comments: Scabbed area right lateral calf.     Neurological:      Mental Status: She is alert. Mental status is at baseline.      Motor: Weakness present.     Psychiatric:         Mood and Affect: Mood normal.         Behavior: Behavior normal.         Thought Content: Thought content normal.       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 Treatments complicated by toxicities and infection  MRI brain 5/13 showed stable enhancing lesions in the left occipital and parietal lobe with noted vasogenic edema  Care managed by Dr. Castro for oncology, Dr. Gamboa for Rad/onc, and outpatient palliatice care  Currently on decadron 2 mg every morning and 3 mg in the evening, keppra 1000 mg BID, and bactrim DS every Monday/Wednesday/Friday     Cancer associated pain  Collaborated with palliative care CRNP for pain management   Patient is currently on Dilaudid 6 mg every 4 hours with hold parameters in place.  Nursing and family made aware of hold parameters  Pain currently controlled with current multimodal pain medication regimen  Continue Dilaudid 6 mg every 4 hours with hold parameters  Continue scheduled extra strength Tylenol every 8 hours and methocarbamol 250 mg every 8 hours  Continue gabapentin 100 mg every morning and 400 mg every night  Continue to monitor and adjust medications if clinically indicated    Anemia  Anemia of chronic disease  Most recent hemoglobin 9.2 on 6/17/2025    Respiratory failure with hypoxia (HCC)  Currently requiring oxygen supplementation via nasal cannula at 2 to 3 L/M    Physical deconditioning  Multifactorial  Continue supportive care at SNF for ADLs  Continue PT/OT/ST; goal is to sit safely in wheelchair, positioning and tolerance with both OT and PT, and pain management  Social service is assisting patient with discharge planning     This note was completed in part utilizing [employer] direct voice recognition software.  Grammatical errors, random word insertion, spelling mistakes, and incomplete sentences may be an occasional consequence of the system secondary to software limitations, ambient noise and  hardware issues.  At the time of dictation, efforts were made to edit, clarify and/or correct errors.  Please read the chart carefully and recognize, using context, where substitutions have occurred.  If you have any questions or concerns about the context, text or information contained within the body of this dictation, please contact myself, the provider, for further clarification.    ZULEYMA Perez  6/20/20251:28 PM

## 2025-06-20 NOTE — ASSESSMENT & PLAN NOTE
Collaborated with palliative care CRNP for pain management   Patient is currently on Dilaudid 6 mg every 4 hours with hold parameters in place.  Nursing and family made aware of hold parameters  Pain currently controlled with current multimodal pain medication regimen  Continue Dilaudid 6 mg every 4 hours with hold parameters  Continue scheduled extra strength Tylenol every 8 hours and methocarbamol 250 mg every 8 hours  Continue gabapentin 100 mg every morning and 400 mg every night  Continue to monitor and adjust medications if clinically indicated

## 2025-06-24 PROBLEM — Z79.899 ENCOUNTER FOR MEDICATION REVIEW: Status: ACTIVE | Noted: 2025-01-01

## 2025-06-24 NOTE — PROGRESS NOTES
Facility: [facility]   POS: 31  Progress Note    Chief Complaint/Reason for visit: STR follow up/ Goals of care with medication review  Code status: DNR/DNH  History of Present Illness: 74-year-old female with stage IV adenocarcinoma of lung metastatic to brain diagnosed July 2024, s/p radiation August 2024 and January 2025.  Treatments were complicated by toxicities and infection.  [facility] 6/[facility]/2025 for severe right flank pain radiating to the abdomen and behind the right leg.  CT of abdomen and pelvis revealed progression of the metastatic disease with enlargement of the right renal mass, possible invasion into the right renal vein.  Patient followed by palliative care service and current pain medication regimen was ordered under the collaboration with palliative care CRNP.  Patient is currently residing at [facility].  Patient's nurse reports that patient's oral intake is very poor and family aware.  Patient is currently receiving scheduled Dilaudid p.o. along with adjunctive pain medications Tylenol, methocarbamol, and gabapentin for cancer related pain.  Due to poor prognosis, patient's family has decided to transition to hospice care after focus meeting today.  Social service will contact [facility] hospice.   Past Medical History: unchanged from history and physical  Past Medical History:  Diagnosis Date    DILLON (acute kidney injury) (HCC) 02/14/2025    Allergic 2022    Allergic to neomiacin and cephalexin    Arthritis     Cancer (HCC)     lung cancer    Cancer (HCC)     mets to brain    Cancer (HCC)     perianal mass    Cataract Nov. 2023    Due to have durgery June 2024    Disease of thyroid gland     Essential thrombocytosis (HCC)     controlled with medication    History of transfusion     Hyperlipidemia 2015    Hypertension 2011    Mass in chest     Nodular goiter     Osteoporosis     Peripheral neuropathy      Pneumonia Oct 16, 2023    Also  Feb 2024    Psoriasis     SIRS (systemic inflammatory response syndrome) (HCC) 06/22/2024    Stroke (HCC)      Family History: Unchanged from history and physical  Social History: Unchanged from history and physical  Review of systems: As per review of medical illness, all other systems reviewed and negative.  Medications: All medication and routine orders were reviewed and updated  Allergies: Reviewed and unchanged  Consults reviewed:PT and OT  Labs/Diagnostics (reviewed by this provider): Copy in Chart  Imaging Reviewed: None today  Physical Exam  Vital signs log reviewed via EMR.  No vitals were taken for this visit  Orientation: Patient was awake today during focus meeting as reported by the interdisciplinary team.    Physical Exam  Vitals and nursing note reviewed.   Constitutional:       General: She is not in acute distress.     Appearance: She is ill-appearing. She is not toxic-appearing or diaphoretic.   HENT:      Nose: No congestion.     Eyes:      Comments: Eyes closed     Cardiovascular:      Rate and Rhythm: Normal rate.   Pulmonary:      Effort: Pulmonary effort is normal. No respiratory distress.   Abdominal:      Palpations: Abdomen is soft.     Musculoskeletal:      Right lower leg: No edema.      Left lower leg: No edema.     Skin:     General: Skin is warm and dry.      Comments: Black eschar right lateral calf not examined today.     Neurological:      Mental Status: She is alert.      Comments: Responds verbally to verbal stimuli.     Assessment/Plan:  74-year-old female with:    Encounter for medication review  Patient is currently on multiple medications for chronic and acute medical conditions.  Provided patient's daughter with a list of her current prescribed medications and reviewed each of the medications with her.  She agreed to stopping aspirin, atorvastatin, and levothyroxine for now.  [facility] hospice was consulted    Stage IV adenocarcinoma of  lung  metastatic to brain (HCC)  Diagnosed in 07/2024, s/p radiation in 08/2024 and 01/2025. Treatments complicated by toxicities and infection  MRI brain 5/13 showed stable enhancing lesions in the left occipital and parietal lobe with noted vasogenic edema  Care managed by Dr. Castro for oncology, Dr. Gamboa for Rad/onc, and outpatient palliatice care  Currently on decadron 2 mg every morning and 3 mg in the evening, keppra 1000 mg BID, and bactrim DS every Monday/Wednesday/Friday  Due to further decline in overall condition and poor prognosis, family has decided to transition to hospice care.  [facility] was consulted today to eval and treat     Metastatic cancer to brain (HCC)  See above plan  Currently on dexamethasone daily    Cancer associated pain  Currently on scheduled Dilaudid 6 mg every 4 hours with hold parameters in place and Dilaudid 4 mg twice daily as needed for breakthrough pain along with adjunctive pain medication regimen of scheduled Tylenol 1000 mg every 8 hours, methocarbamol 250 mg every 8 hours, gabapentin 200 mg every morning and 400 mg every night  [facility] was consulted for evaluation and treatment    GERD (gastroesophageal reflux disease)  Currently on pantoprazole    Respiratory failure with hypoxia (HCC)  Currently requiring oxygen supplementation via nasal cannula 2 to 3 L/min     Physical deconditioning  Patient admitted to  for rehabilitation following hospitalization.  Dependent for ADLs    Moderate protein-calorie malnutrition (HCC)  Multifactorial secondary to metastatic CA and comorbidities.  With weight loss, loss of muscle and subcutaneous tissue  Appetite and oral intake very poor    I have spent a total time of 45 minutes in caring for this patient on the day of the visit/encounter including Counseling / Coordination of care, Documenting in the medical record, Reviewing/placing orders in the medical record (including tests, medications, and/or  procedures), Obtaining or reviewing history  , and Communicating with other healthcare professionals .  Social service, consulted hospice, reviewing medications with family.    This note was completed in part utilizing "[employer] direct voice recognition software.  Grammatical errors, random word insertion, spelling mistakes, and incomplete sentences may be an occasional consequence of the system secondary to software limitations, ambient noise and hardware issues.  At the time of dictation, efforts were made to edit, clarify and/or correct errors.  Please read the chart carefully and recognize, using context, where substitutions have occurred.  If you have any questions or concerns about the context, text or information contained within the body of this dictation, please contact myself, the provider, for further clarification.    ZULEYMA Perez  6/24/20251:59 PM

## 2025-06-24 NOTE — ASSESSMENT & PLAN NOTE
Diagnosed in 07/2024, s/p radiation in 08/2024 and 01/2025. Treatments complicated by toxicities and infection  MRI brain 5/13 showed stable enhancing lesions in the left occipital and parietal lobe with noted vasogenic edema  Care managed by Dr. Castro for oncology, Dr. Gamboa for Rad/onc, and outpatient palliatice care  Currently on decadron 2 mg every morning and 3 mg in the evening, keppra 1000 mg BID, and bactrim DS every Monday/Wednesday/Friday  Due to further decline in overall condition and poor prognosis, family has decided to transition to hospice care.  [facility] hospice was consulted today to eval and treat

## 2025-06-24 NOTE — ASSESSMENT & PLAN NOTE
Patient is currently on multiple medications for chronic and acute medical conditions.  Provided patient's daughter with a list of her current prescribed medications and reviewed each of the medications with her.  She agreed to stopping aspirin, atorvastatin, and levothyroxine for now.  [facility] hospice was consulted

## 2025-06-24 NOTE — ASSESSMENT & PLAN NOTE
Multifactorial secondary to metastatic CA and comorbidities.  With weight loss, loss of muscle and subcutaneous tissue  Appetite and oral intake very poor

## 2025-06-24 NOTE — ASSESSMENT & PLAN NOTE
Currently on scheduled Dilaudid 6 mg every 4 hours with hold parameters in place and Dilaudid 4 mg twice daily as needed for breakthrough pain along with adjunctive pain medication regimen of scheduled Tylenol 1000 mg every 8 hours, methocarbamol 250 mg every 8 hours, gabapentin 200 mg every morning and 400 mg every night  [facility] was consulted for evaluation and treatment

## 2025-06-25 NOTE — PROGRESS NOTES
6/25/2025 6:57 PM  [facility] facility patient requests SOC medications.  Filled electronically via Epic as per [place] State Law.    Requested Prescriptions     Signed Prescriptions Disp Refills    morphine sulfate, concentrate, 100 mg/5mL concentrated solution 30 mL 0     Sig: Take 1 mL (20 mg total) by mouth every 4 (four) hours. May also take 1 mL (20 mg total) every hour as needed (pain/dyspnea). Max Daily Amount: 600 mg.    LORazepam (ATIVAN) 2 mg/mL concentrated solution 30 mL 0     Sig: Take 0.5 mL (1 mg total) by mouth every 4 (four) hours. May also take 0.5 mL (1 mg total) every hour as needed (anxiety/dypsnea). May also give 1 ml (2 mg) every 10 mins PRN seizures and call hospice immediately.       Munira Patel MD  [facility]  Hospice Answering Service: [phone]

## 2025-06-26 NOTE — TELEPHONE ENCOUNTER
Reason for Call: The facility does not have Lorazepam liquid, and will not arrive until tomorrow morning. Nikki is requesting a prescription for Lorazepam tablets.    Caller's Name: Nikki    Caller's Relationship to Patient: Amilcar Stone     Caller's Callback Number: [phone]     Home Health OR Hospice Patient: Hospice

## 2025-06-26 NOTE — TELEPHONE ENCOUNTER
PT Name: Ayla Nye  : 1950  Patient's daughter requesting a call back, the Pt is in pain. Morphine was given to the Pt last night and took over 3 hours for some form of relief. Adavan was ordered and not received yet.   Caller: Juliana Camarena  Relationship: Daughter  Phone Number: [phone]      On call provider acknowledged that the message was received and read.

## 2025-06-26 NOTE — TELEPHONE ENCOUNTER
6/25/2025 10:51 PM  [facility] facility patient requests ativan tablet order to use until liquid arrives.  Filled electronically via Epic as per [place] State Law.    Requested Prescriptions     Signed Prescriptions Disp Refills    LORazepam (ATIVAN) 0.5 mg tablet 30 tablet 0     Sig: Take 2 tablets (1 mg total) by mouth every 4 (four) hours. May also take 2 tablets (1 mg total) every hour as needed (anxiety/dyspnea). May also give 2 mg q10 min prn seizures. Discontinue order for tablets when liquid medication arrives.     Authorizing Provider: LIUDMILA PEREA MD  [facility]  Hospice Answering Service: [phone]

## 2025-07-03 ENCOUNTER — HOME CARE VISIT (OUTPATIENT)
Dept: HOME HOSPICE | Facility: HOSPICE | Age: 75
End: 2025-07-03
Payer: MEDICARE